# Patient Record
Sex: FEMALE | Race: BLACK OR AFRICAN AMERICAN | NOT HISPANIC OR LATINO | Employment: OTHER | URBAN - METROPOLITAN AREA
[De-identification: names, ages, dates, MRNs, and addresses within clinical notes are randomized per-mention and may not be internally consistent; named-entity substitution may affect disease eponyms.]

---

## 2017-01-04 ENCOUNTER — TELEPHONE (OUTPATIENT)
Dept: UROGYNECOLOGY | Facility: CLINIC | Age: 72
End: 2017-01-04

## 2017-01-04 NOTE — TELEPHONE ENCOUNTER
----- Message from Ivon Mata NP sent at 1/3/2017  6:00 PM CST -----  She wants 01/13/2017.  Please call her to schedule.    Ivon

## 2017-01-05 ENCOUNTER — INFUSION (OUTPATIENT)
Dept: INFUSION THERAPY | Facility: HOSPITAL | Age: 72
End: 2017-01-05
Attending: INTERNAL MEDICINE
Payer: MEDICARE

## 2017-01-05 ENCOUNTER — TELEPHONE (OUTPATIENT)
Dept: HEMATOLOGY/ONCOLOGY | Facility: CLINIC | Age: 72
End: 2017-01-05

## 2017-01-05 DIAGNOSIS — C34.90 LUNG CANCER: Primary | ICD-10-CM

## 2017-01-05 DIAGNOSIS — C34.90 MALIGNANT NEOPLASM OF LUNG, UNSPECIFIED LATERALITY, UNSPECIFIED PART OF LUNG: Primary | ICD-10-CM

## 2017-01-05 PROCEDURE — 63600175 PHARM REV CODE 636 W HCPCS: Performed by: INTERNAL MEDICINE

## 2017-01-05 PROCEDURE — 96401 CHEMO ANTI-NEOPL SQ/IM: CPT

## 2017-01-05 RX ADMIN — DENOSUMAB 120 MG: 120 INJECTION SUBCUTANEOUS at 01:01

## 2017-01-05 NOTE — TELEPHONE ENCOUNTER
Returned call to patient, who is calling to request to schedule an appointment with dietary.   Message routed to kirstin ludwig to schedule appointment. Patient voiced understanding the clinic will be in touch with her with appointment date/time.

## 2017-01-05 NOTE — TELEPHONE ENCOUNTER
----- Message from Maribel Brian sent at 1/5/2017  4:20 PM CST -----  Contact: basil-pt's son  Pt had blood work and Basil has some question regarding this.    Contact number [phone]  Ext [phone]

## 2017-01-05 NOTE — TELEPHONE ENCOUNTER
Returned call to patient's son, who is calling to clarify why patient is needing repeat blood work tomorrow.  Nurse explained what creatinine is to son and why patient needs to drink plenty fluids ---  Son voiced understanding.

## 2017-01-05 NOTE — TELEPHONE ENCOUNTER
----- Message from Maribel Brian sent at 1/5/2017  3:50 PM CST -----  Contact: self  Pt is returning a missed call from the nurse.    contact number [phone]

## 2017-01-05 NOTE — NURSING
1337 -- Patient's corrected Ca+ 8.9.  Notified Dr. Vazquez who said OK to give Xgeva as per Zandra, RN.  Patient tolerated well.  Zandra Rn also notified of patient's request to see dietician.  Patient reports taking Ca+ and Vit. D supplements and denies jaw pain.

## 2017-01-05 NOTE — TELEPHONE ENCOUNTER
Informed patient her creatinine is slightly elevated--so encouraged patient to drink plenty fluids---repeat CMP tomorrow. Patient voiced understanding.

## 2017-01-06 ENCOUNTER — LAB VISIT (OUTPATIENT)
Dept: LAB | Facility: HOSPITAL | Age: 72
End: 2017-01-06
Attending: INTERNAL MEDICINE
Payer: MEDICARE

## 2017-01-06 ENCOUNTER — PATIENT MESSAGE (OUTPATIENT)
Dept: HEMATOLOGY/ONCOLOGY | Facility: CLINIC | Age: 72
End: 2017-01-06

## 2017-01-06 ENCOUNTER — TELEPHONE (OUTPATIENT)
Dept: HEMATOLOGY/ONCOLOGY | Facility: CLINIC | Age: 72
End: 2017-01-06

## 2017-01-06 DIAGNOSIS — C34.31 MALIGNANT NEOPLASM OF LOWER LOBE OF RIGHT LUNG: ICD-10-CM

## 2017-01-06 LAB
ALBUMIN SERPL BCP-MCNC: 2.7 G/DL
ALP SERPL-CCNC: 86 U/L
ALT SERPL W/O P-5'-P-CCNC: 41 U/L
ANION GAP SERPL CALC-SCNC: 8 MMOL/L
AST SERPL-CCNC: 46 U/L
BILIRUB SERPL-MCNC: 0.9 MG/DL
BUN SERPL-MCNC: 20 MG/DL
CALCIUM SERPL-MCNC: 7.8 MG/DL
CHLORIDE SERPL-SCNC: 114 MMOL/L
CO2 SERPL-SCNC: 20 MMOL/L
CREAT SERPL-MCNC: 1.7 MG/DL
EST. GFR  (AFRICAN AMERICAN): 34.2 ML/MIN/1.73 M^2
EST. GFR  (NON AFRICAN AMERICAN): 29.7 ML/MIN/1.73 M^2
GLUCOSE SERPL-MCNC: 82 MG/DL
POTASSIUM SERPL-SCNC: 3.7 MMOL/L
PROT SERPL-MCNC: 6.1 G/DL
SODIUM SERPL-SCNC: 142 MMOL/L

## 2017-01-06 PROCEDURE — 80053 COMPREHEN METABOLIC PANEL: CPT

## 2017-01-06 PROCEDURE — 36415 COLL VENOUS BLD VENIPUNCTURE: CPT

## 2017-01-06 NOTE — TELEPHONE ENCOUNTER
Left message for patient informing her her creatinine is slightly better--patient needs 1 liter NS tomorrow at 8am and repeat CMP on Monday. Appointments made and patient encouraged to push plenty oral fluids.

## 2017-01-06 NOTE — TELEPHONE ENCOUNTER
----- Message from Karrie Vazquez MD sent at 1/6/2017 11:31 AM CST -----  Creatinine is slightly better. Can you see i she wants to do IV fluids either today or tomorrow but definitely recheck CMP on Monday 1/9/17

## 2017-01-07 ENCOUNTER — INFUSION (OUTPATIENT)
Dept: INFUSION THERAPY | Facility: HOSPITAL | Age: 72
End: 2017-01-07
Attending: INTERNAL MEDICINE
Payer: MEDICARE

## 2017-01-07 VITALS
SYSTOLIC BLOOD PRESSURE: 144 MMHG | HEART RATE: 59 BPM | DIASTOLIC BLOOD PRESSURE: 62 MMHG | RESPIRATION RATE: 20 BRPM | TEMPERATURE: 98 F

## 2017-01-07 DIAGNOSIS — C34.31 MALIGNANT NEOPLASM OF LOWER LOBE OF RIGHT LUNG: ICD-10-CM

## 2017-01-07 DIAGNOSIS — N17.9 ACUTE RENAL FAILURE, UNSPECIFIED ACUTE RENAL FAILURE TYPE: Primary | ICD-10-CM

## 2017-01-07 PROCEDURE — 96360 HYDRATION IV INFUSION INIT: CPT

## 2017-01-07 PROCEDURE — 25000003 PHARM REV CODE 250: Performed by: INTERNAL MEDICINE

## 2017-01-07 RX ADMIN — SODIUM CHLORIDE 1000 ML: 0.9 INJECTION, SOLUTION INTRAVENOUS at 08:01

## 2017-01-07 NOTE — NURSING
Pt tolerated 1 liter NS with VSS. Pt instructed to call MD with any problems. AVS given and pt discharged home

## 2017-01-09 ENCOUNTER — TELEPHONE (OUTPATIENT)
Dept: HEMATOLOGY/ONCOLOGY | Facility: CLINIC | Age: 72
End: 2017-01-09

## 2017-01-09 DIAGNOSIS — N17.9 ACUTE RENAL FAILURE, UNSPECIFIED ACUTE RENAL FAILURE TYPE: ICD-10-CM

## 2017-01-09 DIAGNOSIS — C34.31 MALIGNANT NEOPLASM OF LOWER LOBE OF RIGHT LUNG: Primary | ICD-10-CM

## 2017-01-09 NOTE — TELEPHONE ENCOUNTER
Spoke with patient.  Patient understands she needs to drink more fluids, as her creatinine is still slightly elevated.  Patient states she would prefer HH to be set up for her to receive IVFs.     Message routed to dr sullivan

## 2017-01-09 NOTE — TELEPHONE ENCOUNTER
----- Message from Karrie Vazquez MD sent at 1/9/2017  3:31 PM CST -----  Contact: Pt's son Basil  She should come for some IV fluids.   Does she want to do home health IV fluids  ----- Message -----     From: Zandra Matt RN     Sent: 1/9/2017   3:06 PM       To: Karrie Vazquez MD    Creatinine is 1.7  Encourage plenty oral fluids?  ~zandra    ----- Message -----     From: Johanny Trimble     Sent: 1/9/2017   2:42 PM       To: George DENG Staff    Pt's son Basil is calling to speak with a nurse or Dr. Vazquez in regards to the tests his mother had last week and today.     Contact number is [phone]

## 2017-01-10 ENCOUNTER — INFUSION (OUTPATIENT)
Dept: INFUSION THERAPY | Facility: HOSPITAL | Age: 72
End: 2017-01-10
Attending: INTERNAL MEDICINE
Payer: MEDICARE

## 2017-01-10 ENCOUNTER — PATIENT MESSAGE (OUTPATIENT)
Dept: HEMATOLOGY/ONCOLOGY | Facility: CLINIC | Age: 72
End: 2017-01-10

## 2017-01-10 ENCOUNTER — TELEPHONE (OUTPATIENT)
Dept: PHARMACY | Facility: CLINIC | Age: 72
End: 2017-01-10

## 2017-01-10 VITALS — SYSTOLIC BLOOD PRESSURE: 136 MMHG | DIASTOLIC BLOOD PRESSURE: 63 MMHG | RESPIRATION RATE: 18 BRPM | HEART RATE: 63 BPM

## 2017-01-10 DIAGNOSIS — E86.0 DEHYDRATION: Primary | ICD-10-CM

## 2017-01-10 PROCEDURE — 96360 HYDRATION IV INFUSION INIT: CPT

## 2017-01-10 PROCEDURE — 25000003 PHARM REV CODE 250: Performed by: INTERNAL MEDICINE

## 2017-01-10 RX ADMIN — SODIUM CHLORIDE 1000 ML: 0.9 INJECTION, SOLUTION INTRAVENOUS at 04:01

## 2017-01-10 NOTE — PLAN OF CARE
Problem: Patient Care Overview (Adult)  Goal: Plan of Care Review  Outcome: Ongoing (interventions implemented as appropriate)  Pt tolerated 1L NS well.

## 2017-01-12 ENCOUNTER — INFUSION (OUTPATIENT)
Dept: INFUSION THERAPY | Facility: HOSPITAL | Age: 72
End: 2017-01-12
Attending: INTERNAL MEDICINE
Payer: MEDICARE

## 2017-01-12 DIAGNOSIS — C34.31 CANCER OF LOWER LOBE OF RIGHT LUNG: Primary | ICD-10-CM

## 2017-01-12 PROCEDURE — 96361 HYDRATE IV INFUSION ADD-ON: CPT

## 2017-01-12 PROCEDURE — 96360 HYDRATION IV INFUSION INIT: CPT

## 2017-01-12 PROCEDURE — 25000003 PHARM REV CODE 250: Performed by: INTERNAL MEDICINE

## 2017-01-12 RX ADMIN — SODIUM CHLORIDE 1000 ML: 0.9 INJECTION, SOLUTION INTRAVENOUS at 04:01

## 2017-01-13 NOTE — PLAN OF CARE
Problem: Patient Care Overview (Adult)  Goal: Plan of Care Review  Outcome: Ongoing (interventions implemented as appropriate)  Pt. Tolerated infusion. V/S stable. Peripheral IV discontinued. No questions at this time.

## 2017-01-17 ENCOUNTER — INFUSION (OUTPATIENT)
Dept: INFUSION THERAPY | Facility: HOSPITAL | Age: 72
End: 2017-01-17
Attending: INTERNAL MEDICINE
Payer: MEDICARE

## 2017-01-17 ENCOUNTER — TELEPHONE (OUTPATIENT)
Dept: PHARMACY | Facility: CLINIC | Age: 72
End: 2017-01-17

## 2017-01-17 VITALS — HEART RATE: 74 BPM | RESPIRATION RATE: 18 BRPM | DIASTOLIC BLOOD PRESSURE: 71 MMHG | SYSTOLIC BLOOD PRESSURE: 154 MMHG

## 2017-01-17 DIAGNOSIS — E86.0 DEHYDRATION: Primary | ICD-10-CM

## 2017-01-17 PROCEDURE — 25000003 PHARM REV CODE 250: Performed by: INTERNAL MEDICINE

## 2017-01-17 PROCEDURE — 96360 HYDRATION IV INFUSION INIT: CPT

## 2017-01-17 RX ADMIN — SODIUM CHLORIDE 1000 ML: 0.9 INJECTION, SOLUTION INTRAVENOUS at 04:01

## 2017-01-17 NOTE — MR AVS SNAPSHOT
Patient Information     Patient Name Sex Naty Betancur Female 1945      Visit Information        Provider Department Dept Phone Center    2017 4:00 PM HOLL[facility], CHEMO [facility] Chemotherapy Infusion [phone] Daniel Dillard      Patient Instructions     None      Your Current Medications Are     amitriptyline (ELAVIL) 25 MG tablet    amlodipine (NORVASC) 5 MG tablet    atenolol (TENORMIN) 50 MG tablet    ciprofloxacin HCl (CILOXAN) 0.3 % ophthalmic solution    clindamycin phosphate 1% (CLINDAGEL) 1 % gel    CREON CpDR    denosumab (XGEVA) 120 mg/1.7 mL (70 mg/mL) Soln    dronabinol (MARINOL) 5 MG capsule    enoxaparin (LOVENOX) 100 mg/mL Syrg    ERGOCALCIFEROL, VITAMIN D2, (VITAMIN D ORAL)    erlotinib (TARCEVA) 150 MG tablet    escitalopram oxalate (LEXAPRO) 10 MG tablet    hydrocodone-acetaminophen 5-325mg (NORCO) 5-325 mg per tablet    levetiracetam (KEPPRA) 750 MG Tab    lorazepam (ATIVAN) 1 MG tablet    mirabegron 50 mg Tb24    multivitamin (THERAGRAN) per tablet    ondansetron (ZOFRAN) 8 MG tablet    rivaroxaban (XARELTO) 20 mg Tab      Facility-Administered Medications     denosumab (XGEVA) solution 120 mg    sodium chloride 0.9% bolus 1,000 mL      Appointments for Next Year     2017 10:30 AM ESTABLISHED PATIENT (30 min.) [facility] Aide Carvalho DO    Arrive at check-in approximately 15 minutes before your scheduled appointment time. Bring all outside medical records and imaging, along with a list of your current medications and insurance card.    [facility], [address] Please park in [facility] Parking [facility]age.    2017  4:00 PM INFUSION 090 MIN (90 min.) [facility], CHEMO    Arrive at check-in approximately 15 minutes before your scheduled appointment time. Bring all outside medical records and imaging, along with a list of your current medications and insurance card.    [facility], 5th Floor    2017   1:30 PM CONSULT (60 min.) Reece Milan - Nutrition DIETITIAN, GENERAL INT MED [facility]    Arrive at check-in approximately 15 minutes before your scheduled appointment time. Bring all outside medical records and imaging, along with a list of your current medications and insurance card.    [facility] Bldg    1/24/2017  3:20 PM NON FASTING LAB (10 min.) [facility], HEMONC CANCER DG    Arrive at check-in approximately 15 minutes before your scheduled appointment time. Bring all outside medical records and imaging, along with a list of your current medications and insurance card.    [facility] 3rd Floor    1/24/2017  4:00 PM INFUSION 090 MIN (90 min.) [facility], CHEMO    Arrive at check-in approximately 15 minutes before your scheduled appointment time. Bring all outside medical records and imaging, along with a list of your current medications and insurance card.    [facility], 5th Floor    1/26/2017  4:00 PM INFUSION 090 MIN (90 min.) [facility], CHEMO    Arrive at check-in approximately 15 minutes before your scheduled appointment time. Bring all outside medical records and imaging, along with a list of your current medications and insurance card.    [facility], 5th Floor    1/31/2017 10:30 AM PET CT  (30 min.) [facility] PET CT LIMIT 400 LBS    You have been scheduled for a PET scan. Do Not eat or drink anything 6 hours before your test, except for water. If you are scheduled at [facility], report to the Radiology / Laboratory check in on the second floor.  Upon arrival to the PET department we will have to do a finger stick to check your glucose level. An intravenous line (IV) will be started, and you will be given a PET tracer. This injection needs to circulate for about 60 minutes while you are sitting in a quiet area. You will  lie on a table and the camera will take pictures, this takes about 30-45 minutes.  A special radiopharmaceutical must be ordered for your test so if you have any questions or need to reschedule your appointment please call nuclear medicine ([phone]).    Report to Radiology/Laboratory  - 2nd Floor    2/2/2017  1:00 PM NON FASTING LAB (15 min.) [facility], HEMONC SAME DAY    Arrive at check-in approximately 15 minutes before your scheduled appointment time. Bring all outside medical records and imaging, along with a list of your current medications and insurance card.    [facility] 3rd Floor    2/2/2017  2:00 PM ESTABLISHED PATIENT (30 min.) [facility] Hematology Oncology Karrie Vazquez MD    Arrive at check-in approximately 15 minutes before your scheduled appointment time. Bring all outside medical records and imaging, along with a list of your current medications and insurance card.    [facility] - 3rd Floor    2/2/2017  2:45 PM INJECTION (15 min.) [facility] INJECTION, [facility] INFUSION    Arrive at check-in approximately 15 minutes before your scheduled appointment time. Bring all outside medical records and imaging, along with a list of your current medications and insurance card.    [facility], 5th Floor    3/13/2017 12:00 PM MRI BRAIN CONT (45 min.) [facility] MRI WIDE BORE    Magnetic Resonance Imaging- You will not be allowed to have the MRI if you have a cardiac pace-maker, implantable defibrillator, neurostimulator, biostimulator, or if you have anuerysm clips in your brain (from many years ago).  WHAT YOUR DOCTOR NEEDS TO KNOW: You should tell your doctor if you have any metal in your body either from surgery or an injury. This includes metal pins, clips, plates, screws, shrapnel, ear implants, or permanent eyeliner. If female, you should tell your doctor if there is a chance you might be pregnant.  Please bring with you any papers your doctor has given you to sign. Please bring with you a list of medications you are currently taking!  PLEASE REPORT TO THE MAGNETIC RESONANCE CENTER 30 MINUTES PRIOR TO YOUR SCHEDULED APPOINTMENT TIME.  WHAT IS THE PURPOSE OF THIS TEST: An MRI is a painless test that takes pictures of the inside of your body. The pictures are taken in slices which show only a few layers of body tissue at a time. Pictures taken this way can help your doctor find and see problems in the body more easily. The MRI uses a magnetic field and radio waves instead of X-RAYS.  WHERE WILL YOUR TEST BE DONE AND BY WHOM? The test will be done in the MRI building. It will be done under the supervision of a radiologist and a radiologic technologist. The person giving you these instructions will tell you where to report for this test. It usually takes 15 minutes to one hour.  HOW TO PREPARE FOR YOUR TEST: Wear comfortable clothing with no metal buttons or zippers. Do not wear jewelry including rings, earrings, necklaces, bracelets, or watches. Take all metal objects out of your hair. You will be asked to answer yes or no on a questionaire form regarding the possibility of any metal in your body. Please bring someone with you if you are unable to answer the questions. A parent must accompany any child having an MRI. Tell your doctor if you are afraid of being in a closed or cramped space. Your doctor will decide if you need medicine to help you relax.  A small needle may be placed in a vein in your arm or hand so that you may receive a contrast solution. THIS IS NOT A DYE AND DOES NOT CONTAIN IODINE. NO special preparation for any MRI exam EXCEPT for a Magnetic Resonance Cholangiopancreatogram which the patient should fast 4 hours prior to the scheduled  appointment time. You may take your usual medication with a small sip of water.  WHAT TO EXPECT DURING YOUR TEST: The radiologic technologist will check to make  sure that you have removed all metal objects. You will be asked to lie down on the table of the MRI machine. To allow for the best quality exam, it is VERY IMPORTANT that you LIE VERY STILL during your exam. When the test starts, the table will move into the open tunnel of the machine. Once the machine starts taking pictures, you will hear loud hammering or grinding noises. You may be able to wear headphones and hear music to block out the loud noise of the machine. If your test requires the use of contrast material, a small needle will be placed in a vein of your arm or hand. The contrast material will be pushed through the IV tube that has been placed in your arm or hand. The skin around your IV may feel warm or cold. You should not have any changes in the way you feel. If you do, please tell the technologist.  WHAT TO EXPECT AFTER THE EXAM: If you were given medication to relax you, someone else will need to drive you home. DO NOT DRIVE OR USE HEAVY EQUIPMENT IF YOU TAKE MEDICATION THAT MAKES YOU DROWSY. You may resume normal daily activity if you did not receive sedation.  WHAT YOU NEED TO KNOW ABOUT YOUR APPOINTMENT: If you are unable to follow the above instructions or have questions or if you are delayed or unable to keep your scheduled appointment, please notify the following: In [place], call the Magnetic Resonance Center at [phone] In [place], call the Radiology Department at [phone]. On the [facility], call the Radiology Department at [phone]. On the [facility], call the Radiology Department at [phone].    [facility]    3/13/2017  1:30 PM ESTABLISHED PATIENT (30 min.) Reece Milan - Neurosurgery [facility] Salty Ferrera MD    Arrive at check-in approximately 15 minutes before your scheduled appointment time. Bring all outside medical records and imaging, along with a list of your current medications and insurance card.    7th Floor      Allergies     Tobradex  "[Tobramycin-dexamethasone] Swelling    Iodinated Contrast Media - Iv Dye Hives    Morphine Other (See Comments)    "shaking" and tremors    Latex Rash    Phenytoin Sodium Extended Other (See Comments), Rash      Medications You Received from 01/16/2017 1756 to 01/17/2017 1756        Date/Time Order Dose Route Action     01/17/2017 1645 sodium chloride 0.9% bolus 1,000 mL 1,000 mL Intravenous New Bag      Current Discharge Medication List     Cannot display discharge medications since this is not an admission.      "

## 2017-01-17 NOTE — PLAN OF CARE
Problem: Patient Care Overview (Adult)  Goal: Discharge Needs Assessment  Outcome: Ongoing (interventions implemented as appropriate)  -1601 Patient tolerated treatment well. Discharged without complaints or S/S of adverse event. AVS given.  Instructed to call provider for any questions or concerns.

## 2017-01-17 NOTE — PLAN OF CARE
Problem: Patient Care Overview (Adult)  Goal: Individualization & Mutuality  Outcome: Ongoing (interventions implemented as appropriate)  -2611 Labs , hx, and medications reviewed. Assessment completed. Discussed plan of care with patient. Patient in agreement. Chair reclined and warm blanket and snack offered.

## 2017-01-18 ENCOUNTER — TELEPHONE (OUTPATIENT)
Dept: HEMATOLOGY/ONCOLOGY | Facility: CLINIC | Age: 72
End: 2017-01-18

## 2017-01-18 NOTE — TELEPHONE ENCOUNTER
----- Message from Maribel Brian sent at 1/18/2017  8:27 AM CST -----  Contact: Basil MarcialPt's son  Pt's son is calling for test results.    Contact number [phone]  Ext [phone]

## 2017-01-19 ENCOUNTER — TELEPHONE (OUTPATIENT)
Dept: HEMATOLOGY/ONCOLOGY | Facility: CLINIC | Age: 72
End: 2017-01-19

## 2017-01-19 NOTE — TELEPHONE ENCOUNTER
"Returned call to patient, who is calling to state she doesn't feel a nurse will be "able to stick her today."  Patient R arm is the only arm useable to start IVs.   Patient notes her R hand is slightly bruised (better than yesterday) from last IV on Tuesday.  Rescheduled patient's IVFs to tomorrow at 4pm.  Patient voiced understanding.  "

## 2017-01-19 NOTE — TELEPHONE ENCOUNTER
----- Message from Johanny Trimble sent at 1/19/2017 11:01 AM CST -----  Contact: self  Pt is calling to speak with Zandra in regards to fluids she has been having twice a week. She states her hand is very black and she is not sure if she should come in today.    Contact number is [phone]

## 2017-01-20 ENCOUNTER — INFUSION (OUTPATIENT)
Dept: INFUSION THERAPY | Facility: HOSPITAL | Age: 72
End: 2017-01-20
Attending: INTERNAL MEDICINE
Payer: MEDICARE

## 2017-01-20 ENCOUNTER — TELEPHONE (OUTPATIENT)
Dept: UROGYNECOLOGY | Facility: CLINIC | Age: 72
End: 2017-01-20

## 2017-01-20 ENCOUNTER — TELEPHONE (OUTPATIENT)
Dept: PHARMACY | Facility: CLINIC | Age: 72
End: 2017-01-20

## 2017-01-20 VITALS
HEART RATE: 70 BPM | RESPIRATION RATE: 18 BRPM | SYSTOLIC BLOOD PRESSURE: 146 MMHG | DIASTOLIC BLOOD PRESSURE: 77 MMHG | TEMPERATURE: 98 F

## 2017-01-20 DIAGNOSIS — E86.0 DEHYDRATION: Primary | ICD-10-CM

## 2017-01-20 PROCEDURE — 96360 HYDRATION IV INFUSION INIT: CPT

## 2017-01-20 PROCEDURE — 25000003 PHARM REV CODE 250: Performed by: INTERNAL MEDICINE

## 2017-01-20 RX ADMIN — SODIUM CHLORIDE 1000 ML: 0.9 INJECTION, SOLUTION INTRAVENOUS at 04:01

## 2017-01-20 NOTE — TELEPHONE ENCOUNTER
Pt wanted to know if her  can be seen today. Informed pt Dr Ozuna don't see male pt's. Pt voice understanding and call ended.

## 2017-01-20 NOTE — TELEPHONE ENCOUNTER
----- Message from Miesha Mata sent at 1/19/2017  4:37 PM CST -----  Contact: spouse  Pt is needing a call back, she have questions, she can be reached at [phone].

## 2017-01-24 ENCOUNTER — TELEPHONE (OUTPATIENT)
Dept: HEMATOLOGY/ONCOLOGY | Facility: CLINIC | Age: 72
End: 2017-01-24

## 2017-01-24 ENCOUNTER — NUTRITION (OUTPATIENT)
Dept: NUTRITION | Facility: CLINIC | Age: 72
End: 2017-01-24
Payer: MEDICARE

## 2017-01-24 ENCOUNTER — INFUSION (OUTPATIENT)
Dept: INFUSION THERAPY | Facility: HOSPITAL | Age: 72
End: 2017-01-24
Attending: INTERNAL MEDICINE
Payer: MEDICARE

## 2017-01-24 VITALS
HEART RATE: 73 BPM | TEMPERATURE: 99 F | RESPIRATION RATE: 18 BRPM | SYSTOLIC BLOOD PRESSURE: 138 MMHG | DIASTOLIC BLOOD PRESSURE: 69 MMHG

## 2017-01-24 VITALS — BODY MASS INDEX: 22.56 KG/M2 | WEIGHT: 135.38 LBS | HEIGHT: 65 IN

## 2017-01-24 DIAGNOSIS — Z00.8 NUTRITIONAL ASSESSMENT: ICD-10-CM

## 2017-01-24 DIAGNOSIS — F32.A DEPRESSION, UNSPECIFIED DEPRESSION TYPE: Primary | ICD-10-CM

## 2017-01-24 DIAGNOSIS — E86.0 DEHYDRATION: Primary | ICD-10-CM

## 2017-01-24 DIAGNOSIS — R63.4 UNINTENTIONAL WEIGHT LOSS: ICD-10-CM

## 2017-01-24 DIAGNOSIS — C34.31 MALIGNANT NEOPLASM OF LOWER LOBE OF RIGHT LUNG: Primary | ICD-10-CM

## 2017-01-24 DIAGNOSIS — N28.9 ACUTE RENAL DISEASE: Primary | ICD-10-CM

## 2017-01-24 PROCEDURE — 25000003 PHARM REV CODE 250: Performed by: INTERNAL MEDICINE

## 2017-01-24 PROCEDURE — 99999 PR PBB SHADOW E&M-EST. PATIENT-LVL III: CPT | Mod: PBBFAC,,,

## 2017-01-24 PROCEDURE — 96360 HYDRATION IV INFUSION INIT: CPT

## 2017-01-24 PROCEDURE — 97802 MEDICAL NUTRITION INDIV IN: CPT | Mod: S$GLB,,, | Performed by: DIETITIAN, REGISTERED

## 2017-01-24 RX ORDER — SODIUM CHLORIDE 9 MG/ML
INJECTION, SOLUTION INTRAVENOUS ONCE
Status: COMPLETED | OUTPATIENT
Start: 2017-01-24 | End: 2017-01-24

## 2017-01-24 RX ADMIN — SODIUM CHLORIDE: 0.9 INJECTION, SOLUTION INTRAVENOUS at 03:01

## 2017-01-24 NOTE — PLAN OF CARE
Problem: Patient Care Overview (Adult)  Goal: Plan of Care Review  Outcome: Ongoing (interventions implemented as appropriate)  1653-Patient tolerated treatment well. Discharged without complaints or S/S of adverse event. AVS given.  Instructed to call provider for any questions or concerns. Discussed patients upcoming appointments, patient verbalized understanding and will return to clinic Thursday.

## 2017-01-24 NOTE — MR AVS SNAPSHOT
Patient Information     Patient Name Sex Naty Betancur Female 1945      Visit Information        Provider Department Dept Phone Center    2017 4:00 PM NOM, CHEMO [facility] Chemotherapy Infusion [phone] Daniel Dillard      Patient Instructions     None      Your Current Medications Are     amitriptyline (ELAVIL) 25 MG tablet    amlodipine (NORVASC) 5 MG tablet    atenolol (TENORMIN) 50 MG tablet    clindamycin phosphate 1% (CLINDAGEL) 1 % gel    CREON CpDR    denosumab (XGEVA) 120 mg/1.7 mL (70 mg/mL) Soln    dronabinol (MARINOL) 5 MG capsule    enoxaparin (LOVENOX) 100 mg/mL Syrg    ERGOCALCIFEROL, VITAMIN D2, (VITAMIN D ORAL)    erlotinib (TARCEVA) 150 MG tablet    escitalopram oxalate (LEXAPRO) 10 MG tablet    hydrocodone-acetaminophen 5-325mg (NORCO) 5-325 mg per tablet    levetiracetam (KEPPRA) 750 MG Tab    lorazepam (ATIVAN) 1 MG tablet    mirabegron 50 mg Tb24    multivitamin (THERAGRAN) per tablet    ondansetron (ZOFRAN) 8 MG tablet    rivaroxaban (XARELTO) 20 mg Tab      Facility-Administered Medications     0.9%  NaCl infusion    denosumab (XGEVA) solution 120 mg      Appointments for Next Year     2017  2:20 PM CONSULT - NEPHROLOGY ([facility]) (40 min.) [facility]phrology Mihaela Bazzi MD    Arrive at check-in approximately 15 minutes before your scheduled appointment time. Bring all outside medical records and imaging, along with a list of your current medications and insurance card.    [facility] - 5th Floor    2017  4:00 PM INFUSION 090 MIN (90 min.) [facility], CHEMO    Arrive at check-in approximately 15 minutes before your scheduled appointment time. Bring all outside medical records and imaging, along with a list of your current medications and insurance card.    [facility], 5th Floor    2017  3:30 PM US RETROPER (45 min.) [facility] US 11 ALL    Arrive at check-in approximately 15 minutes  before your scheduled appointment time. Bring all outside medical records and imaging, along with a list of your current medications and insurance card.    2nd Floor    1/31/2017 10:30 AM PET CT  (30 min.) [facility] PET CT LIMIT 400 LBS    You have been scheduled for a PET scan. Do Not eat or drink anything 6 hours before your test, except for water. If you are scheduled at [facility], report to the Radiology / Laboratory check in on the second floor.  Upon arrival to the PET department we will have to do a finger stick to check your glucose level. An intravenous line (IV) will be started, and you will be given a PET tracer. This injection needs to circulate for about 60 minutes while you are sitting in a quiet area. You will lie on a table and the camera will take pictures, this takes about 30-45 minutes.  A special radiopharmaceutical must be ordered for your test so if you have any questions or need to reschedule your appointment please call nuclear medicine ([phone]).    Report to Radiology/Laboratory  - 2nd Floor    2/1/2017  1:30 PM ESTABLISHED PATIENT (30 min.) [facility] Aide Carvalho DO    Arrive at check-in approximately 15 minutes before your scheduled appointment time. Bring all outside medical records and imaging, along with a list of your current medications and insurance card.    [facility], [address] Please park in [facility] Parking [facility]age.    2/2/2017  1:00 PM NON FASTING LAB (15 min.) [facility] LAB, HEMONC SAME DAY    Arrive at check-in approximately 15 minutes before your scheduled appointment time. Bring all outside medical records and imaging, along with a list of your current medications and insurance card.    [facility] 3rd Floor    2/2/2017  2:00 PM ESTABLISHED PATIENT (30 min.) [place] - Hematology Oncology Karrie Vazquez MD    Arrive at check-in  approximately 15 minutes before your scheduled appointment time. Bring all outside medical records and imaging, along with a list of your current medications and insurance card.    [facility] - 3rd Floor    2/2/2017  2:45 PM INJECTION (15 min.) [facility] INJECTION, [facility] INFUSION    Arrive at check-in approximately 15 minutes before your scheduled appointment time. Bring all outside medical records and imaging, along with a list of your current medications and insurance card.    [facility], 5th Floor    3/13/2017 12:00 PM MRI BRAIN CONT (45 min.) [facility] MRI WIDE BORE    Magnetic Resonance Imaging- You will not be allowed to have the MRI if you have a cardiac pace-maker, implantable defibrillator, neurostimulator, biostimulator, or if you have anuerysm clips in your brain (from many years ago).  WHAT YOUR DOCTOR NEEDS TO KNOW: You should tell your doctor if you have any metal in your body either from surgery or an injury. This includes metal pins, clips, plates, screws, shrapnel, ear implants, or permanent eyeliner. If female, you should tell your doctor if there is a chance you might be pregnant. Please bring with you any papers your doctor has given you to sign. Please bring with you a list of medications you are currently taking!  PLEASE REPORT TO THE MAGNETIC RESONANCE CENTER 30 MINUTES PRIOR TO YOUR SCHEDULED APPOINTMENT TIME.  WHAT IS THE PURPOSE OF THIS TEST: An MRI is a painless test that takes pictures of the inside of your body. The pictures are taken in slices which show only a few layers of body tissue at a time. Pictures taken this way can help your doctor find and see problems in the body more easily. The MRI uses a magnetic field and radio waves instead of X-RAYS.  WHERE WILL YOUR TEST BE DONE AND BY WHOM? The test will be done in the MRI building. It will be done under the supervision of a radiologist and a radiologic technologist.  The person giving you these instructions will tell you where to report for this test. It usually takes 15 minutes to one hour.  HOW TO PREPARE FOR YOUR TEST: Wear comfortable clothing with no metal buttons or zippers. Do not wear jewelry including rings, earrings, necklaces, bracelets, or watches. Take all metal objects out of your hair. You will be asked to answer yes or no on a questionaire form regarding the possibility of any metal in your body. Please bring someone with you if you are unable to answer the questions. A parent must accompany any child having an MRI. Tell your doctor if you are afraid of being in a closed or cramped space. Your doctor will decide if you need medicine to help you relax.  A small needle may be placed in a vein in your arm or hand so that you may receive a contrast solution. THIS IS NOT A DYE AND DOES NOT CONTAIN IODINE. NO special preparation for any MRI exam EXCEPT for a Magnetic Resonance Cholangiopancreatogram which the patient should fast 4 hours prior to the scheduled  appointment time. You may take your usual medication with a small sip of water.  WHAT TO EXPECT DURING YOUR TEST: The radiologic technologist will check to make sure that you have removed all metal objects. You will be asked to lie down on the table of the MRI machine. To allow for the best quality exam, it is VERY IMPORTANT that you LIE VERY STILL during your exam. When the test starts, the table will move into the open tunnel of the machine. Once the machine starts taking pictures, you will hear loud hammering or grinding noises. You may be able to wear headphones and hear music to block out the loud noise of the machine. If your test requires the use of contrast material, a small needle will be placed in a vein of your arm or hand. The contrast material will be pushed through the IV tube that has been placed in your arm or hand. The skin around your IV may feel warm or cold. You should not have any changes in  "the way you feel. If you do, please tell the technologist.  WHAT TO EXPECT AFTER THE EXAM: If you were given medication to relax you, someone else will need to drive you home. DO NOT DRIVE OR USE HEAVY EQUIPMENT IF YOU TAKE MEDICATION THAT MAKES YOU DROWSY. You may resume normal daily activity if you did not receive sedation.  WHAT YOU NEED TO KNOW ABOUT YOUR APPOINTMENT: If you are unable to follow the above instructions or have questions or if you are delayed or unable to keep your scheduled appointment, please notify the following: In [place], call the Magnetic Resonance Center at [phone] In [place], call the Radiology Department at [phone]. On the [facility], call the Radiology Department at [phone]. On the [facility], call the Radiology Department at [phone].    MRI Building    3/13/2017  1:30 PM ESTABLISHED PATIENT (30 min.) [facility] - Neurosurgery [facility] Salty Ferrera MD    Arrive at check-in approximately 15 minutes before your scheduled appointment time. Bring all outside medical records and imaging, along with a list of your current medications and insurance card.    7th Floor         Default Flowsheet Data (last 24 hours)      Amb Complex Vitals Yosvany        01/24/17 1520 01/24/17 1332             Measurements    Weight  61.4 kg (135 lb 5.8 oz)       Height  5' 5" (1.651 m)       BSA (Calculated - sq m)  1.68 sq meters       BMI (Calculated)  22.6       /63        Temp 98.6 °F (37 °C)        Pulse 68        Resp 18                Allergies     Tobradex [Tobramycin-dexamethasone] Swelling    Iodinated Contrast Media - Iv Dye Hives    Morphine Other (See Comments)    "shaking" and tremors    Latex Rash    Phenytoin Sodium Extended Other (See Comments), Rash      Medications You Received from 01/23/2017 1641 to 01/24/2017 1641        Date/Time Order Dose Route Action     01/24/2017 1543 0.9%  NaCl infusion   Intravenous New Bag      Current Discharge Medication List     " Cannot display discharge medications since this is not an admission.

## 2017-01-24 NOTE — TELEPHONE ENCOUNTER
----- Message from Maribel Brian sent at 1/24/2017  3:23 PM CST -----  Contact: Pt's son Basil  Please call the pt's son regarding the pt's lab results.    Contact number [phone]  Ext [phone]

## 2017-01-24 NOTE — TELEPHONE ENCOUNTER
Creatinine is even more elevated at 1.9. Continue twice weekly fluids?  Encourage plenty oral hydration --3 liters daily?      ~wen

## 2017-01-24 NOTE — PROGRESS NOTES
"Referring Physician:Karrie Vazquez MD     Reason for visit:  Chief Complaint   Patient presents with    Lung Cancer    Nutrition Counseling        :1945 /    Allergies Reviewed  Meds Reviewed    Anthropometrics  Weight:61.4 kg (135 lb 5.8 oz)  Height:5' 5" (1.651 m)  BMI:Body mass index is 22.53 kg/(m^2).   IBW:   56.8 kg    Meds:  Outpatient Medications Prior to Visit   Medication Sig Dispense Refill    amitriptyline (ELAVIL) 25 MG tablet Take one tablet by mouth once daily 30 tablet 3    amlodipine (NORVASC) 5 MG tablet Take 1 tablet (5 mg total) by mouth once daily. 30 tablet 11    atenolol (TENORMIN) 50 MG tablet Take one tablet by mouth once daily 30 tablet 4    clindamycin phosphate 1% (CLINDAGEL) 1 % gel Apply topically 2 (two) times daily. 60 g 6    CREON CpDR TAKE ONE CAPSULE BY MOUTH THREE TIMES A DAY WITH MEALS (Patient taking differently: TAKE ONE CAPSULE BY MOUTH THREE TIMES A DAY 30 MINUTES BEFORE MEALS) 270 capsule 4    denosumab (XGEVA) 120 mg/1.7 mL (70 mg/mL) Soln Inject 120 mg into the skin every 28 days.      dronabinol (MARINOL) 5 MG capsule TAKE ONE CAPSULE BY MOUTH TWO TIMES A DAY BEFORE MEALS 60 capsule 2    enoxaparin (LOVENOX) 100 mg/mL Syrg Inject 1 mL (100 mg total) into the skin once daily. 30 Syringe 3    ERGOCALCIFEROL, VITAMIN D2, (VITAMIN D ORAL) Take by mouth.      erlotinib (TARCEVA) 150 MG tablet Take 1 tablet (150 mg total) by mouth once daily. 30 tablet 6    escitalopram oxalate (LEXAPRO) 10 MG tablet Take 1 tablet (10 mg total) by mouth once daily. 30 tablet 2    hydrocodone-acetaminophen 5-325mg (NORCO) 5-325 mg per tablet Take 1 tablet by mouth every 6 (six) hours as needed for Pain. 20 tablet 0    levetiracetam (KEPPRA) 750 MG Tab Take 1 tablet (750 mg total) by mouth 2 (two) times daily. 60 tablet 3    lorazepam (ATIVAN) 1 MG tablet TAKE ONE TABLET BY MOUTH TWICE DAILY 60 tablet 0    mirabegron 50 mg Tb24 Take 1 tablet (50 mg total) by mouth once " daily. 30 tablet 11    multivitamin (THERAGRAN) per tablet Take 1 tablet by mouth once daily.      ondansetron (ZOFRAN) 8 MG tablet Take 1 tablet (8 mg total) by mouth 4 (four) times daily as needed for Nausea. 60 tablet 2    rivaroxaban (XARELTO) 20 mg Tab Take 1 tablet (20 mg total) by mouth daily with dinner or evening meal. 30 tablet 2     Facility-Administered Medications Prior to Visit   Medication Dose Route Frequency Provider Last Rate Last Dose    denosumab (XGEVA) solution 120 mg  120 mg Subcutaneous 1 time in Clinic/HOD Karrie Vazquez MD             Labs:   Cr  1.6   Alb  2.4     Estimated Nutrition Needs:   2149 Kcals/day( 35 kcal/kg to promote weight gain),  61-73 g protein( 1.0-1.2 g/kg)     Diet Hx:    Pt & spouse present for encounter.  Pt states her UBW was 176 lbs a year ago, prior to brain surgery for tumor removal.  She states the Marinol she is taking is helping to stimulate her appetite; she feels like she is generally eating well.  Her  prepares meals; they grocery shop together.  Pt reports she usually eats 3 meals/day; if she snacks in the evening it's a few cookies.  Is drinking at least 6 bottles of water/day per MD recc (2/2 elevated Cr); also drinks fruit juices.  Avoids milk-2/2 lactose intolerance.  Has lost her taste for bread/rice.    Breakfast:    cooks:  Grits and eggs; this am:  2 sausage biscuits, turkey jin, fresh orange, apple juice    Note:  Pt states if she doesn't eat breakfast until 10 am, she only eats 2 meals/day.  Lunch: varies; did not eat any lunch today prior to 1:30 appt (with labs and other appt to follow)    Dinner:   Last night:  Smothered chicken with spinach.    Evening snack:  Vanilla wafers, and sandwich cookies a little later    Assessment:   Pt & spouse very attentive and asked relevant questions.  They were interested in healthy eating diet regimen, and spouse seems very supportive; he states he is willing to prepare anything pt would  like.  We discussed importance of small, frequent meals and snacks; healthy snack ideas; recipes; high-calorie, high-protein grocery shopping and cooking tips.  All questions were answered and nutrition concerns addressed.  Pt & spouse verbalized understanding of information.      Nutrition Diagnosis:   Involuntary weight loss RT inadequate energy intake AEB metastatic cancer    Recommendations:   High calorie, high protein diet.  Aim for 5-6 meals/snacks daily.  Encourage fluid intake.  Handouts provided & reviewed:  Nutrition for the Person With Cancer During Treatment:  A Guide for Patients and Families (ACS); Oral Care Recipes; High-Calorie, High-Protein Recipes; High-Calorie, High Protein Beverages        Consultation Time:30 minutes.    Follow Up:  Pt provided with dietitian contact number and advised to call with questions or make future appointment if further intervention needed.  Pt also provided with Oncology Dietitian Ashley Arredondo's contact information.

## 2017-01-24 NOTE — PLAN OF CARE
Problem: Patient Care Overview (Adult)  Goal: Individualization & Mutuality  1. Difficult IV start - only 1 arm accessible due to lymph node removal in L arm   Outcome: Ongoing (interventions implemented as appropriate)  1525-Labs , hx, and medications reviewed, patient seen by nutrition consult prior to arrival. Assessment completed. Discussed plan of care with patient. Patient in agreement. Chair reclined and warm blanket and snack offered.

## 2017-01-25 ENCOUNTER — TELEPHONE (OUTPATIENT)
Dept: ADMINISTRATIVE | Facility: OTHER | Age: 72
End: 2017-01-25

## 2017-01-25 ENCOUNTER — TELEPHONE (OUTPATIENT)
Dept: INFUSION THERAPY | Facility: HOSPITAL | Age: 72
End: 2017-01-25

## 2017-01-25 NOTE — TELEPHONE ENCOUNTER
----- Message from Karrie Vazquez MD sent at 1/24/2017  3:46 PM CST -----  Nita,  Please see below.  Order is in   ----- Message -----     From: Eleni Wheatley RN     Sent: 1/24/2017   3:40 PM       To: Karrie Vazquez MD, Zandra Matt RN    Ms St would like a referral to see Dr. Rincon, she reports intermittent depression.  Thanks!

## 2017-01-25 NOTE — TELEPHONE ENCOUNTER
Spoke with patient's son. Renal US and nephrology appointment scheduled. Patient will continue with twice weekly fluids in clinic.encouraged patient to drink 3 liters of water daily. Son voiced understanding.

## 2017-01-26 ENCOUNTER — OFFICE VISIT (OUTPATIENT)
Dept: NEPHROLOGY | Facility: CLINIC | Age: 72
End: 2017-01-26
Payer: MEDICARE

## 2017-01-26 ENCOUNTER — INFUSION (OUTPATIENT)
Dept: INFUSION THERAPY | Facility: HOSPITAL | Age: 72
End: 2017-01-26
Attending: INTERNAL MEDICINE
Payer: MEDICARE

## 2017-01-26 VITALS
SYSTOLIC BLOOD PRESSURE: 140 MMHG | WEIGHT: 135.38 LBS | DIASTOLIC BLOOD PRESSURE: 70 MMHG | BODY MASS INDEX: 22.56 KG/M2 | OXYGEN SATURATION: 98 % | HEIGHT: 65 IN | HEART RATE: 62 BPM

## 2017-01-26 VITALS
RESPIRATION RATE: 16 BRPM | DIASTOLIC BLOOD PRESSURE: 74 MMHG | HEART RATE: 69 BPM | SYSTOLIC BLOOD PRESSURE: 165 MMHG | TEMPERATURE: 98 F

## 2017-01-26 DIAGNOSIS — N39.0 URINARY TRACT INFECTION WITHOUT HEMATURIA, SITE UNSPECIFIED: ICD-10-CM

## 2017-01-26 DIAGNOSIS — R33.9 INCOMPLETE BLADDER EMPTYING: ICD-10-CM

## 2017-01-26 DIAGNOSIS — Z85.3 HISTORY OF BREAST CANCER: ICD-10-CM

## 2017-01-26 DIAGNOSIS — E83.51 HYPOCALCEMIA: ICD-10-CM

## 2017-01-26 DIAGNOSIS — N20.0 CALCIUM OXALATE KIDNEY STONES: ICD-10-CM

## 2017-01-26 DIAGNOSIS — I10 ESSENTIAL HYPERTENSION: Chronic | ICD-10-CM

## 2017-01-26 DIAGNOSIS — C34.90: Primary | ICD-10-CM

## 2017-01-26 DIAGNOSIS — N17.9 AKI (ACUTE KIDNEY INJURY): Primary | ICD-10-CM

## 2017-01-26 PROCEDURE — 25000003 PHARM REV CODE 250: Performed by: INTERNAL MEDICINE

## 2017-01-26 PROCEDURE — 96360 HYDRATION IV INFUSION INIT: CPT

## 2017-01-26 PROCEDURE — 3078F DIAST BP <80 MM HG: CPT | Mod: S$GLB,,, | Performed by: INTERNAL MEDICINE

## 2017-01-26 PROCEDURE — 99204 OFFICE O/P NEW MOD 45 MIN: CPT | Mod: S$GLB,,, | Performed by: INTERNAL MEDICINE

## 2017-01-26 PROCEDURE — 1157F ADVNC CARE PLAN IN RCRD: CPT | Mod: S$GLB,,, | Performed by: INTERNAL MEDICINE

## 2017-01-26 PROCEDURE — 3077F SYST BP >= 140 MM HG: CPT | Mod: S$GLB,,, | Performed by: INTERNAL MEDICINE

## 2017-01-26 PROCEDURE — 99499 UNLISTED E&M SERVICE: CPT | Mod: S$GLB,,, | Performed by: INTERNAL MEDICINE

## 2017-01-26 PROCEDURE — 99999 PR PBB SHADOW E&M-EST. PATIENT-LVL IV: CPT | Mod: PBBFAC,,, | Performed by: INTERNAL MEDICINE

## 2017-01-26 PROCEDURE — 1160F RVW MEDS BY RX/DR IN RCRD: CPT | Mod: S$GLB,,, | Performed by: INTERNAL MEDICINE

## 2017-01-26 PROCEDURE — 1159F MED LIST DOCD IN RCRD: CPT | Mod: S$GLB,,, | Performed by: INTERNAL MEDICINE

## 2017-01-26 PROCEDURE — 1126F AMNT PAIN NOTED NONE PRSNT: CPT | Mod: S$GLB,,, | Performed by: INTERNAL MEDICINE

## 2017-01-26 RX ADMIN — SODIUM CHLORIDE 1000 ML: 0.9 INJECTION, SOLUTION INTRAVENOUS at 05:01

## 2017-01-26 NOTE — PLAN OF CARE
Problem: Patient Care Overview  Goal: Individualization & Mutuality  Warm blankets cookies  Outcome: Ongoing (interventions implemented as appropriate)  Labs , hx, and medications reviewed. Assessment completed. Discussed plan of care with patient. Patient in agreement. VSS.  Chair reclined and warm blanket and snack offered. Will cont to monitor

## 2017-01-26 NOTE — MR AVS SNAPSHOT
[facility] - Nephrology  [address]  [facility] [address]  Phone: [phone]  Fax: [phone]                  [address]   2017 2:20 PM   Office Visit    Description:  Female : 1945   Provider:  Mihaela Bazzi MD   Department:  [facility] - Nephrology           Diagnoses this Visit        Comments    STEFAN (acute kidney injury)    -  Primary     Calcium oxalate kidney stones         Urinary tract infection without hematuria, site unspecified         Essential hypertension         Incomplete bladder emptying         Hypocalcemia         History of breast cancer                To Do List           Future Appointments        Provider Department Dept Phone    2017 3:30 PM NOM US 11 ALL [facility] [phone]    2017 10:30 AM [facility] PET CT LIMIT 400 LBS [facility] [phone]    2017 1:30 PM Aide Carvalho DO [facility] [phone]    2017 1:00 PM LAB, HEMONC SAME DAY [facility] [phone]    2017 2:00 PM Karrie Vazquez MD [place] - Hematology Oncology [phone]      Goals (5 Years of Data)     None      Follow-Up and Disposition     Return in about 1 week (around 2017).      [facility]     [facility] Care [facility] -  Assistance  Registered nurses in the [facility] provide clinical advisement, health education, appointment booking, and other advisory services.  Call for this free service at [phone].             Medications           Message regarding Medications     Verify the changes and/or additions to your medication regime listed below are the same as discussed with your clinician today.  If any of these changes or additions are incorrect, please notify your healthcare provider.        STOP taking these medications     rivaroxaban (XARELTO) 20 mg Tab Take 1 tablet (20 mg total) by mouth daily with dinner or evening meal.            Verify that the below list of medications is an accurate representation of the medications you are currently taking.  If none reported, the list may be blank. If incorrect, please contact your healthcare provider. Carry this list with you in case of emergency.           Current Medications     amitriptyline (ELAVIL) 25 MG tablet Take one tablet by mouth once daily    amlodipine (NORVASC) 5 MG tablet Take 1 tablet (5 mg total) by mouth once daily.    atenolol (TENORMIN) 50 MG tablet Take one tablet by mouth once daily    clindamycin phosphate 1% (CLINDAGEL) 1 % gel Apply topically 2 (two) times daily.    CREON CpDR TAKE ONE CAPSULE BY MOUTH THREE TIMES A DAY WITH MEALS    denosumab (XGEVA) 120 mg/1.7 mL (70 mg/mL) Soln Inject 120 mg into the skin every 28 days.    dronabinol (MARINOL) 5 MG capsule TAKE ONE CAPSULE BY MOUTH TWO TIMES A DAY BEFORE MEALS    enoxaparin (LOVENOX) 100 mg/mL Syrg Inject 1 mL (100 mg total) into the skin once daily.    ERGOCALCIFEROL, VITAMIN D2, (VITAMIN D ORAL) Take by mouth.    erlotinib (TARCEVA) 150 MG tablet Take 1 tablet (150 mg total) by mouth once daily.    escitalopram oxalate (LEXAPRO) 10 MG tablet Take 1 tablet (10 mg total) by mouth once daily.    hydrocodone-acetaminophen 5-325mg (NORCO) 5-325 mg per tablet Take 1 tablet by mouth every 6 (six) hours as needed for Pain.    levetiracetam (KEPPRA) 750 MG Tab Take 1 tablet (750 mg total) by mouth 2 (two) times daily.    lorazepam (ATIVAN) 1 MG tablet TAKE ONE TABLET BY MOUTH TWICE DAILY    mirabegron 50 mg Tb24 Take 1 tablet (50 mg total) by mouth once daily.    multivitamin (THERAGRAN) per tablet Take 1 tablet by mouth once daily.    ondansetron (ZOFRAN) 8 MG tablet Take 1 tablet (8 mg total) by mouth 4 (four) times daily as needed for Nausea.           Clinical Reference Information           Vital Signs - Last Recorded  Most recent update: 1/26/2017  3:07 PM by Negar Cohn MA    BP Pulse Ht Wt LMP SpO2    (!)  "140/70 62 5' 5" (1.651 m) 61.4 kg (135 lb 5.8 oz) (LMP Unknown) 98%    BMI                22.53 kg/m2          Blood Pressure          Most Recent Value    BP  (!)  140/70      Allergies as of 1/26/2017     Tobradex [Tobramycin-dexamethasone]    Iodinated Contrast Media - Iv Dye    Morphine    Latex    Phenytoin Sodium Extended      Immunizations Administered on Date of Encounter - 1/26/2017     None      Orders Placed During Today's Visit     Future Labs/Procedures Expected by Expires    TANMAY  1/26/2017 3/27/2018    Anti-neutrophilic cytoplasmic antibody  1/26/2017 3/27/2018    Immunofixation electrophoresis  1/26/2017 3/27/2018    Protein / creatinine ratio, urine  1/26/2017 1/26/2018    Protein electrophoresis, serum  1/26/2017 3/27/2018    Urinalysis  1/26/2017 1/26/2018    Urine culture  1/26/2017 3/27/2018    Magnesium  1/31/2017 3/27/2018    Renal function panel  1/31/2017 3/27/2018      Instructions      1. Labs: tomorrow and next Tuesday renal US as already scheduled  Urine for me today      2.  Medications: no change for now        5. BP:  Take BP and pulse  twice daily for one week, record              Bring results  to next visit.              Goal :   <140/90        7. Please avoid or minimize all NSAIDS (ibuprofen, motrin, aleve, indocin, naprosyn) to minimize the risk to your kidneys    8. Return to clinic: 7 days labs as above       "

## 2017-01-26 NOTE — PATIENT INSTRUCTIONS
1. Labs: tomorrow and next Tuesday renal US as already scheduled  Urine for me today      2.  Medications: no change for now        5. BP:  Take BP and pulse  twice daily for one week, record              Bring results  to next visit.              Goal :   <140/90        7. Please avoid or minimize all NSAIDS (ibuprofen, motrin, aleve, indocin, naprosyn) to minimize the risk to your kidneys    8. Return to clinic: 7 days labs as above

## 2017-01-26 NOTE — Clinical Note
Eric trying to order a serum: CPK ( creatine phosphokinase level), but cannot find it. Please help me order this for next possible blood draw

## 2017-01-26 NOTE — LETTER
January 26, 2017      Karrie Vazquez MD  [address]  [place] [address]           [facility] - Nephrology  [address]  [place] [address]  Phone: [phone]  Fax: [phone]          Patient: Naty St   MR Number: [id number]   YOB: 1945   Date of Visit: 1/26/2017       Dear Dr. Karrie Vazquez:    Thank you for referring Naty St to me for evaluation. Attached you will find relevant portions of my assessment and plan of care.    If you have questions, please do not hesitate to call me. I look forward to following Naty St along with you.    Sincerely,    Mihaela Bazzi MD    Enclosure  CC:  No Recipients    If you would like to receive this communication electronically, please contact [email or web address] or [phone] to request more information on [employer] access.    For providers and/or their staff who would like to refer a patient to [facility], please contact us through our one-stop-shop provider referral line, [facility], at [phone].    If you feel you have received this communication in error or would no longer like to receive these types of communications, please e-mail [email or web address]

## 2017-01-26 NOTE — PROGRESS NOTES
Subjective:       Patient ID: Naty St is a 71 y.o. 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  She denies, LUTS, SOB, dysuria, hematuria, + peripheral edema now bilaterally, + 4  Since  Increase in serum crt patient has been receiving 1L NS infusion 2 x weekly  Review of Systems   Constitutional: Positive for fatigue and unexpected weight change. Negative for appetite change, chills and fever.        30 lb weight loss since 9/2016   HENT: Negative for congestion, facial swelling, hearing loss, nosebleeds and trouble swallowing.    Eyes: Negative for pain, discharge, redness and visual disturbance.   Respiratory: Negative for cough, chest tightness, shortness of breath and wheezing.    Cardiovascular: Positive for leg swelling. Negative for chest pain and palpitations.        Worsening bilateral peripheral edema   Gastrointestinal: Negative for abdominal pain, constipation, diarrhea, nausea and vomiting.   Endocrine: Negative for cold intolerance, heat intolerance and polydipsia.   Genitourinary: Negative for decreased urine volume, difficulty urinating, dysuria, flank pain, hematuria and urgency.   Musculoskeletal: Negative for arthralgias, back pain, joint swelling and myalgias.   Skin: Negative for color change, pallor, rash and wound.   Neurological: Negative for dizziness, tremors, seizures, syncope, speech difficulty, weakness and headaches.   Hematological: Negative for adenopathy. Does not bruise/bleed easily.   Psychiatric/Behavioral: Negative for agitation,  "behavioral problems, dysphoric mood, self-injury and sleep disturbance.       Objective:     Blood pressure (!) 140/70, pulse 62, height 5' 5" (1.651 m), weight 61.4 kg (135 lb 5.8 oz), SpO2 98 %.      Physical Exam   Constitutional: She is oriented to person, place, and time. She appears well-developed and well-nourished. No distress.   Chronically ill, swallow complexion, NAD,    HENT:   Head: Normocephalic and atraumatic.   Mouth/Throat: No oropharyngeal exudate.   Eyes: Conjunctivae and EOM are normal. Pupils are equal, round, and reactive to light. Right eye exhibits no discharge. Left eye exhibits no discharge. No scleral icterus.   Neck: Normal range of motion. Neck supple. No JVD present. No tracheal deviation present. No thyromegaly present.   Cardiovascular: Normal rate, regular rhythm and normal heart sounds.  Exam reveals no gallop.    No murmur heard.  + 4 bialteral nonpitting edema, unable to assess pulses   Pulmonary/Chest: Effort normal and breath sounds normal. No stridor. No respiratory distress. She has no wheezes. She has no rales. She exhibits no tenderness.   Abdominal: Soft. Bowel sounds are normal. She exhibits distension. She exhibits no mass. There is no tenderness. There is no rebound and no guarding. No hernia.   Bladder not palpable   Musculoskeletal: She exhibits no edema or tenderness.   Lymphadenopathy:     She has no cervical adenopathy.   Neurological: She is alert and oriented to person, place, and time. She exhibits normal muscle tone.   Skin: Skin is warm and dry. No rash noted. She is not diaphoretic. No erythema.   Psychiatric: She has a normal mood and affect. Judgment and thought content normal.   Nursing note and vitals reviewed.      Assessment:       1. STEFAN (acute kidney injury)    2. Calcium oxalate kidney stones    3. Urinary tract infection without hematuria, site unspecified    4. Essential hypertension    5. Incomplete bladder emptying    6. Hypocalcemia    7. 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     STEFAN superimposed on CKD. STEFAN may be related to use of Epidermal GF inhibitor, must consider other causes such as obstruction from tumor or nephrolithiasis, AIN, infection, vasculitis.  There have been case reports of rhabydomyolysis with denosumab. Will check cpk,    CKD likely related to longstanding  HTN, nephrosclerosis with smoking history, possible reduced renal mass from scarring/ obstruction with nephrolithiasis and UTIs,     Plan:  Renal US in and serologies ordered  UA Uc/s urine protein anaylsis  Would probably hold on further NS infusion with increasing peripheral edema, and increasing serum crt   Urine studies ordered as well,  Further recommendations including possible renal biopsy after above results available,     Repeat in one week  Lab Results   Component Value Date    CREATININE 1.9 (H) 01/24/2017     Protein Creatinine Ratios: ordered   No results found for: UTPCR    2. Acid-Base:  Repeat next week , will likely need sodium bicarb may need to consider a VBG as well  Would check magnesium while on epdermal growht factor inhibitor  Lab Results   Component Value Date     01/24/2017    K 4.2 01/24/2017    CO2 20 (L) 01/24/2017         3. Renal osteodystrophy: last PTH  Check with vit d serum albumin 2.7 corrected calcium 9.1  Lab Results   Component Value Date    CALCIUM 8.3 (L) 01/24/2017    PHOS 3.7 01/26/2016       4. Anemia:  As per hematology  Lab Results   Component Value Date    HGB 9.9 (L) 01/05/2017        5. HTN:        Medication: no change      Monitor BP as directed in instructions      7. Weight: Weight: 61.4 kg (135 lb 5.8 oz). she states that her daily weights   From reading flowsheets 30 lb weight loss since 9/2016  Hold on lasix at this time, despite peripheral edema she is  without respiratory symptoms.  Wt Readings from Last 3 Encounters:   01/26/17 61.4 kg (135 lb 5.8 oz)   01/24/17 61.4 kg (135 lb 5.8 oz)   12/19/16 51.7 kg (114 lb)       8. Lipid management:   Lab Results   Component Value Date    LDLCALC 81.6 12/17/2015         9. Diet:  Education/referral:  Hold until further results, would not protein restrict at this time, may consider Nepro      Sodium:      Potassium:      Phosphorus:      Protein:      Fluid:       10. ESRD planing: see above  W/u first       Education:       Access:      13.  Please avoid or minimize all NSAIDS (ibuprofen, motrin, aleve, indocin, naprosyn) to minimize the risk to your kidneys.      14. Follow up in : testing in am including US and labs, labs in 5 days, RTC 7 days

## 2017-01-27 ENCOUNTER — HOSPITAL ENCOUNTER (OUTPATIENT)
Dept: RADIOLOGY | Facility: HOSPITAL | Age: 72
Discharge: HOME | End: 2017-01-27
Attending: INTERNAL MEDICINE
Payer: MEDICARE

## 2017-01-27 DIAGNOSIS — N28.9 ACUTE RENAL DISEASE: ICD-10-CM

## 2017-01-27 DIAGNOSIS — N17.9 AKI (ACUTE KIDNEY INJURY): Primary | ICD-10-CM

## 2017-01-27 PROCEDURE — 76770 US EXAM ABDO BACK WALL COMP: CPT | Mod: 26,,, | Performed by: RADIOLOGY

## 2017-01-27 PROCEDURE — 76770 US EXAM ABDO BACK WALL COMP: CPT | Mod: TC

## 2017-01-27 RX ORDER — CALCIUM CARBONATE/VITAMIN D3 500-10/5ML
400 LIQUID (ML) ORAL 2 TIMES DAILY
COMMUNITY
Start: 2017-01-27 | End: 2017-02-01

## 2017-01-27 NOTE — PLAN OF CARE
Problem: Patient Care Overview  Goal: Discharge Needs Assessment  Outcome: Ongoing (interventions implemented as appropriate)  Pt tolerated 1L IVF without adverse effects. VSS. Provided AVS & verbalized understanding of RTC date. DC with family ambulating independently.

## 2017-01-29 RX ORDER — LORAZEPAM 1 MG/1
TABLET ORAL
Qty: 60 TABLET | Refills: 2 | Status: SHIPPED | OUTPATIENT
Start: 2017-01-29 | End: 2017-04-29 | Stop reason: SDUPTHER

## 2017-01-31 ENCOUNTER — HOSPITAL ENCOUNTER (OUTPATIENT)
Dept: RADIOLOGY | Facility: HOSPITAL | Age: 72
Discharge: HOME | End: 2017-01-31
Attending: INTERNAL MEDICINE
Payer: MEDICARE

## 2017-01-31 VITALS — WEIGHT: 133.88 LBS | HEIGHT: 65 IN | BODY MASS INDEX: 22.31 KG/M2

## 2017-01-31 DIAGNOSIS — C34.31 MALIGNANT NEOPLASM OF LOWER LOBE OF RIGHT LUNG: ICD-10-CM

## 2017-01-31 PROCEDURE — A9552 F18 FDG: HCPCS

## 2017-01-31 PROCEDURE — 78815 PET IMAGE W/CT SKULL-THIGH: CPT | Mod: TC

## 2017-01-31 PROCEDURE — 78815 PET IMAGE W/CT SKULL-THIGH: CPT | Mod: 26,PS,, | Performed by: RADIOLOGY

## 2017-02-01 ENCOUNTER — PATIENT MESSAGE (OUTPATIENT)
Dept: OTOLARYNGOLOGY | Facility: CLINIC | Age: 72
End: 2017-02-01

## 2017-02-01 ENCOUNTER — OFFICE VISIT (OUTPATIENT)
Dept: UROGYNECOLOGY | Facility: CLINIC | Age: 72
End: 2017-02-01
Attending: OBSTETRICS & GYNECOLOGY
Payer: MEDICARE

## 2017-02-01 VITALS
SYSTOLIC BLOOD PRESSURE: 120 MMHG | DIASTOLIC BLOOD PRESSURE: 70 MMHG | HEIGHT: 65 IN | WEIGHT: 135.81 LBS | BODY MASS INDEX: 22.63 KG/M2

## 2017-02-01 DIAGNOSIS — R39.15 URINARY URGENCY: ICD-10-CM

## 2017-02-01 DIAGNOSIS — N32.81 OAB (OVERACTIVE BLADDER): Primary | ICD-10-CM

## 2017-02-01 LAB
BILIRUB SERPL-MCNC: ABNORMAL MG/DL
BLOOD URINE, POC: ABNORMAL
COLOR, POC UA: ABNORMAL
GLUCOSE UR QL STRIP: ABNORMAL
KETONES UR QL STRIP: ABNORMAL
LEUKOCYTE ESTERASE URINE, POC: ABNORMAL
NITRITE, POC UA: ABNORMAL
PH, POC UA: 5
PROTEIN, POC: ABNORMAL
SPECIFIC GRAVITY, POC UA: 1.01
UROBILINOGEN, POC UA: ABNORMAL

## 2017-02-01 PROCEDURE — 87086 URINE CULTURE/COLONY COUNT: CPT

## 2017-02-01 PROCEDURE — 99999 PR PBB SHADOW E&M-EST. PATIENT-LVL V: CPT | Mod: PBBFAC,,, | Performed by: OBSTETRICS & GYNECOLOGY

## 2017-02-01 PROCEDURE — 1160F RVW MEDS BY RX/DR IN RCRD: CPT | Mod: S$GLB,,, | Performed by: OBSTETRICS & GYNECOLOGY

## 2017-02-01 PROCEDURE — 81002 URINALYSIS NONAUTO W/O SCOPE: CPT | Mod: S$GLB,,, | Performed by: OBSTETRICS & GYNECOLOGY

## 2017-02-01 PROCEDURE — 3078F DIAST BP <80 MM HG: CPT | Mod: S$GLB,,, | Performed by: OBSTETRICS & GYNECOLOGY

## 2017-02-01 PROCEDURE — 51701 INSERT BLADDER CATHETER: CPT | Mod: S$GLB,,, | Performed by: OBSTETRICS & GYNECOLOGY

## 2017-02-01 PROCEDURE — 99213 OFFICE O/P EST LOW 20 MIN: CPT | Mod: 25,S$GLB,, | Performed by: OBSTETRICS & GYNECOLOGY

## 2017-02-01 PROCEDURE — 1157F ADVNC CARE PLAN IN RCRD: CPT | Mod: S$GLB,,, | Performed by: OBSTETRICS & GYNECOLOGY

## 2017-02-01 PROCEDURE — 3074F SYST BP LT 130 MM HG: CPT | Mod: S$GLB,,, | Performed by: OBSTETRICS & GYNECOLOGY

## 2017-02-01 PROCEDURE — 1126F AMNT PAIN NOTED NONE PRSNT: CPT | Mod: S$GLB,,, | Performed by: OBSTETRICS & GYNECOLOGY

## 2017-02-01 PROCEDURE — 1159F MED LIST DOCD IN RCRD: CPT | Mod: S$GLB,,, | Performed by: OBSTETRICS & GYNECOLOGY

## 2017-02-01 RX ORDER — CIPROFLOXACIN HYDROCHLORIDE 3 MG/ML
SOLUTION/ DROPS OPHTHALMIC
COMMUNITY
Start: 2017-01-31 | End: 2017-09-15

## 2017-02-01 NOTE — PROGRESS NOTES
Subjective:       Patient ID: Naty St is a 71 y.o. female.    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 She was Initially seen for concern of prolapse, not found to have significant prolapse. She was started on Mirabegron 25 mg which was increased to 50 mg.  She presents today for follow up. Some moderate improvement in urinary symptoms but still continues of have urinary urgency and frequency. She has a recent renal sonogram which showed evidence of a non obstructing stone in the lower pole of the right kidney.        HPI   Ohs Peq Urogyn Hpi      Question    9/19/2016  9:01 AM CDT     General Urogynecology: Are you experiencing the following?       Dysuria (painful urination)  No     Nocturia:  waking up at night to empty your bladder   Yes     If you answered yes to the previous question, how many times does this happen per night?  1-2, new      Enuresis (urine loss during sleep)  No     Dribbling urine after you urinate  No     Hematuria (urine appears red)  No     Type of stream  Weak     Urinary Incontinence (General): Are you experiencing the following?       Past consultation for incontinence: Have you ever seen someone for the evaluation of incontinence?  No     If you answered yes to the previous question, please select all the therapies you have tried.   None of the above     Please note the effectiveness of the therapies.       Need to wear protection to keep clothes dry   No     If you answered yes to the previous question, please daniel the protection you use.   Pads     If you wear protection, how much wetness is typically on each pad?  Light     If you wear protection, how often do you have to change per day, if applicable?   2     Stress Symptoms: Are you  "experiencing the following?       Leakage of urine with cough, laugh and/or sneeze  No     If you answered yes to the previous question, what is the frequency in days, weeks and/or months?  Never     Leakage of urine with sex  No     Leakage of urine with bending/ lifting  No     Leakage of urine with briskly walking or jogging  No     If you lose urine for any other reason not previously mentioned, please note it below, if applicable.        Urge Symptoms: Are you experiencing the following?       Urgency ("got to go" feeling)  No     Urge: How frequently do you feel an urge to urinate (feeling like you "gotta go" to the bathroom and can't wait)  Daily     Do you experience a leakage of urine when you have a feeling of urgency?   No     Leakage of urine when unaware  No     Past use of anticholinergics (medications used to treat overactive bladder)  No     If you answered yes to the previous question, please daniel the anticholinergics you have used:        Have you ever used Mirbetriq (aka Mirabegron)?   No     Prolapse Symptoms: Are you experiencing any of the following?        Falling out/ Bulging/ Heaviness in the vagina  Yes     Vaginal/ Abdominal Pain/ Pressure  No     Need to strain/ Push to void  No     Need to wait on the toilet before you void  No     Unusual position to urinate (using your hands to push back the vaginal bulge)  No      Sensation of incomplete emptying  No     Past use of pessary device  No     If you answered yes to the previous question, please list the devices you have used below.        Bowel Symptoms: Are you experiencing any of the following?       Constipation  Yes,      Diarrhea   Yes,      Hematochezia (bloody stool)  No     Incomplete evacuation of stool  No     Involuntary loss of formed stool  No     Fecal smearing/urgency  No     Involuntary loss of gas  No     Vaginal Symptoms: Are you experiencing any of the following?        Abnormal vaginal bleeding   No     Vaginal dryness "  No     Sexually active   No     Dyspareunia (painful intercourse)  No     Estrogen use   No     HPI reviewed and confirmed with patient       GYN & OB History  No LMP recorded (lmp unknown). Patient has had a hysterectomy.   Date of Last Pap: No result found    OB History    Para Term  AB SAB TAB Ectopic Multiple Living   4 4              # Outcome Date GA Lbr Elkin/2nd Weight Sex Delivery Anes PTL Lv   4 Para            3 Para            2 Para            1 Para                 Past Medical History   Diagnosis Date    Blood clot in vein 2016    Breast cancer     Cataract     History of breast cancer     History of pancreatic cancer     Hx of psychiatric care     Hypertension     Pancreatic cancer     Posterior capsular opacification, left eye     Psychiatric problem     Seizures 2016    Therapy      Past Surgical History   Procedure Laterality Date    Pancreatic cancer       whipple    Kidney stone surgery  --laser    Left eswl  12    Cysto and right ureteral stent  12    Oophorectomy  2012     Laparoscopic BSO, lysis of adhesions, cystoscopy greater than 35mins     Breast lumpectomy       left    Ecoli  2010     removal of blockage, done throat, then through the liver.    Whipple procedure w/ laparoscopy      Breast lumpectomy       left    Cataract extraction Bilateral      yag OU    Colonoscopy N/A 2015     Procedure: COLONOSCOPY;  Surgeon: Alex Carmona MD;  Location: [facility] ([address] [place] [address]);  Service: Endoscopy;  Laterality: N/A;  2 year f/u    Hysterectomy      Cholecystectomy      Bone biospy  3/9/16    Brain surgery  16     tumor removal 16         Current Outpatient Prescriptions:     amitriptyline (ELAVIL) 25 MG tablet, Take one tablet by mouth once daily, Disp: 30 tablet, Rfl: 3    amlodipine (NORVASC) 5 MG tablet, Take 1 tablet (5 mg total) by mouth once daily., Disp: 30 tablet, Rfl: 11     atenolol (TENORMIN) 50 MG tablet, Take one tablet by mouth once daily, Disp: 30 tablet, Rfl: 4    ciprofloxacin HCl (CILOXAN) 0.3 % ophthalmic solution, , Disp: , Rfl:     clindamycin phosphate 1% (CLINDAGEL) 1 % gel, Apply topically 2 (two) times daily., Disp: 60 g, Rfl: 6    CREON CpDR, TAKE ONE CAPSULE BY MOUTH THREE TIMES A DAY WITH MEALS (Patient taking differently: TAKE ONE CAPSULE BY MOUTH THREE TIMES A DAY 30 MINUTES BEFORE MEALS), Disp: 270 capsule, Rfl: 4    denosumab (XGEVA) 120 mg/1.7 mL (70 mg/mL) Soln, Inject 120 mg into the skin every 28 days., Disp: , Rfl:     dronabinol (MARINOL) 5 MG capsule, TAKE ONE CAPSULE BY MOUTH TWO TIMES A DAY BEFORE MEALS, Disp: 60 capsule, Rfl: 2    enoxaparin (LOVENOX) 100 mg/mL Syrg, Inject 1 mL (100 mg total) into the skin once daily., Disp: 30 Syringe, Rfl: 3    ERGOCALCIFEROL, VITAMIN D2, (VITAMIN D ORAL), Take by mouth., Disp: , Rfl:     erlotinib (TARCEVA) 150 MG tablet, Take 1 tablet (150 mg total) by mouth once daily., Disp: 30 tablet, Rfl: 6    escitalopram oxalate (LEXAPRO) 10 MG tablet, Take 1 tablet (10 mg total) by mouth once daily., Disp: 30 tablet, Rfl: 2    levetiracetam (KEPPRA) 750 MG Tab, Take 1 tablet (750 mg total) by mouth 2 (two) times daily., Disp: 60 tablet, Rfl: 3    lorazepam (ATIVAN) 1 MG tablet, TAKE ONE TABLET BY MOUTH TWICE DAILY, Disp: 60 tablet, Rfl: 2    magnesium oxide 400 mg Cap, Take 400 mg by mouth 2 (two) times daily., Disp: , Rfl:     mirabegron 50 mg Tb24, Take 1 tablet (50 mg total) by mouth once daily., Disp: 30 tablet, Rfl: 11    multivitamin (THERAGRAN) per tablet, Take 1 tablet by mouth once daily., Disp: , Rfl:     hydrocodone-acetaminophen 5-325mg (NORCO) 5-325 mg per tablet, Take 1 tablet by mouth every 6 (six) hours as needed for Pain., Disp: 20 tablet, Rfl: 0    ondansetron (ZOFRAN) 8 MG tablet, Take 1 tablet (8 mg total) by mouth 4 (four) times daily as needed for Nausea., Disp: 60 tablet, Rfl: 2  No  current facility-administered medications for this visit.     Facility-Administered Medications Ordered in Other Visits:     denosumab (XGEVA) solution 120 mg, 120 mg, Subcutaneous, 1 time in Clinic/HOD, Karrie Vazquez MD    Review of Systems  Review of Systems   Constitutional: Negative.  Negative for activity change, appetite change, chills, diaphoresis, fatigue, fever and unexpected weight change.   HENT: Negative.    Eyes: Negative.    Respiratory: Negative.  Negative for cough.    Cardiovascular: Negative.    Gastrointestinal: Positive for constipation and diarrhea. Negative for abdominal distention, abdominal pain, anal bleeding, blood in stool, nausea, rectal pain and vomiting.   Endocrine: Negative.    Genitourinary: Positive for frequency. Negative for decreased urine volume, difficulty urinating, dyspareunia, enuresis, flank pain, genital sores, hematuria, menstrual problem, pelvic pain, urgency, vaginal bleeding, vaginal discharge and vaginal pain.        Prolapse  + vaginal bulge   +vaginal pressure    Musculoskeletal: Negative for back pain.   Skin: Negative for color change, pallor, rash and wound.   Allergic/Immunologic: Negative for environmental allergies, food allergies and immunocompromised state.   Hematological: Negative for adenopathy. Does not bruise/bleed easily.   Psychiatric/Behavioral: Negative for agitation, behavioral problems, confusion and sleep disturbance.           Objective:     Physical Exam   Constitutional: She is oriented to person, place, and time. She appears well-developed.   HENT:   Head: Normocephalic and atraumatic.   Eyes: Conjunctivae and EOM are normal.   Neck: Normal range of motion. Neck supple.   Cardiovascular: Normal rate, regular rhythm, S1 normal, S2 normal, normal heart sounds and intact distal pulses.    Pulmonary/Chest: Effort normal and breath sounds normal. She exhibits no tenderness.   Abdominal: Soft. Bowel sounds are normal. She exhibits no distension  and no mass. There is no hepatosplenomegaly, splenomegaly or hepatomegaly. There is no tenderness. There is no rigidity, no rebound, no guarding and no CVA tenderness. Hernia confirmed negative in the right inguinal area and confirmed negative in the left inguinal area.   Genitourinary: Pelvic exam was performed with patient supine. Rectum normal, vagina normal, skenes normal and bartholins normal. Right labia normal and left labia normal. Urethra exhibits hypermobility. Urethra exhibits no urethral caruncle, no urethral diverticulum and no urethral mass. Right bartholin is not enlarged and not tender. Left bartholin is not enlarged and not tender. Rectal exam shows resting tone normal and active tone normal. Rectal exam shows no external hemorrhoid, no fissure, no tenderness, anal tone normal and no dovetailing. Guaiac negative stool. There is atrophy in the vagina. No foreign body, tenderness, bleeding, unspecified prolapse of vaginal walls, fistula, mesh exposure or lavator tenderness in the vagina. No vaginal discharge found. Right adnexum displays no tenderness. Left adnexum displays no tenderness. Cervix exhibits absence. Uterus is absent.   PVR: 25 ML  Empty cough stress test: Negative.  Kegel: 2/5    POP-Q  Aa: -2 Ba: -2 C: -5   GH: 3 PB: 2 TVL: 8   Ap: -1 Bp: -1 D:                      Musculoskeletal: Normal range of motion.   Lymphadenopathy:     She has no axillary adenopathy.        Right: No inguinal adenopathy present.        Left: No inguinal adenopathy present.   Neurological: She is alert and oriented to person, place, and time. She has normal strength and normal reflexes. Cranial nerves II through XII intact. No cranial nerve deficit.   Skin: Skin is warm, dry and intact.   Psychiatric: She has a normal mood and affect. Her speech is normal and behavior is normal. Judgment normal. Cognition and memory are normal.          Assessment:        1. OAB (overactive bladder)    2. Urinary urgency              Plan:      1. OAB slightly improved  --Empty bladder every 3 hours.  Empty well: wait a minute, lean forward on toilet.    --Avoid dietary irritants (see sheet).  Keep diary x 3-5 days to determine your irritants.  --KEGELS: do 10 in AM and 10 in PM, holding each x 10 seconds.  When you feel urge to go, STOP, KEGEL, and when urge has passed, then go to bathroom.  Start PT.    --URGE: continue Myrbetriq to  50 mg daily.    2. Vaginal atrophy (dryness):   Use REPLENS or REFRESH OTC: 1/2 applicator full in vagina twice a week.      3. Incomplete bladder emtyping: normal PVR on exam        Approximately 30 min were spent in consult, 90 % in discussion.     Aide Carvalho DO  Female Pelvic Medicine and Reconstructive Surgery  [facility] [place], [place]

## 2017-02-01 NOTE — MR AVS SNAPSHOT
[facility]  [address] [facility] [address]  Phone: [phone]                  [address]   2017 1:30 PM   Office Visit    Description:  Female : 1945   Provider:  Aide Carvalho DO   Department:  [facility]           Reason for Visit     Follow-up           Diagnoses this Visit        Comments    OAB (overactive bladder)    -  Primary     Urinary urgency                To Do List           Future Appointments        Provider Department Dept Phone    2017 1:00 PM LAB, HEMONC SAME DAY [facility] [phone]    2017 2:00 PM MD Daniel Keith - Hematology Oncology [phone]    2017 2:45 PM INJECTION, [facility] INFUSION [facility] [phone]    2/3/2017 10:00 AM Tano Quintero, PhD Daniel - [facility] [phone]    3/13/2017 12:00 PM [facility] MRI WIDE BORE [facility] [phone]      Goals (5 Years of Data)     None      [facility]     [facility] Care [facility] -  Assistance  Registered nurses in the [facility] provide clinical advisement, health education, appointment booking, and other advisory services.  Call for this free service at [phone].             Medications           Message regarding Medications     Verify the changes and/or additions to your medication regime listed below are the same as discussed with your clinician today.  If any of these changes or additions are incorrect, please notify your healthcare provider.             Verify that the below list of medications is an accurate representation of the medications you are currently taking.  If none reported, the list may be blank. If incorrect, please contact your healthcare provider. Carry this list with you in case of emergency.           Current Medications     amitriptyline (ELAVIL) 25 MG tablet Take one tablet by mouth once daily    amlodipine  "(NORVASC) 5 MG tablet Take 1 tablet (5 mg total) by mouth once daily.    atenolol (TENORMIN) 50 MG tablet Take one tablet by mouth once daily    ciprofloxacin HCl (CILOXAN) 0.3 % ophthalmic solution     clindamycin phosphate 1% (CLINDAGEL) 1 % gel Apply topically 2 (two) times daily.    CREON CpDR TAKE ONE CAPSULE BY MOUTH THREE TIMES A DAY WITH MEALS    denosumab (XGEVA) 120 mg/1.7 mL (70 mg/mL) Soln Inject 120 mg into the skin every 28 days.    dronabinol (MARINOL) 5 MG capsule TAKE ONE CAPSULE BY MOUTH TWO TIMES A DAY BEFORE MEALS    enoxaparin (LOVENOX) 100 mg/mL Syrg Inject 1 mL (100 mg total) into the skin once daily.    ERGOCALCIFEROL, VITAMIN D2, (VITAMIN D ORAL) Take by mouth.    erlotinib (TARCEVA) 150 MG tablet Take 1 tablet (150 mg total) by mouth once daily.    escitalopram oxalate (LEXAPRO) 10 MG tablet Take 1 tablet (10 mg total) by mouth once daily.    levetiracetam (KEPPRA) 750 MG Tab Take 1 tablet (750 mg total) by mouth 2 (two) times daily.    lorazepam (ATIVAN) 1 MG tablet TAKE ONE TABLET BY MOUTH TWICE DAILY    magnesium oxide 400 mg Cap Take 400 mg by mouth 2 (two) times daily.    mirabegron 50 mg Tb24 Take 1 tablet (50 mg total) by mouth once daily.    multivitamin (THERAGRAN) per tablet Take 1 tablet by mouth once daily.    hydrocodone-acetaminophen 5-325mg (NORCO) 5-325 mg per tablet Take 1 tablet by mouth every 6 (six) hours as needed for Pain.    ondansetron (ZOFRAN) 8 MG tablet Take 1 tablet (8 mg total) by mouth 4 (four) times daily as needed for Nausea.           Clinical Reference Information           Vital Signs - Last Recorded  Most recent update: 2/1/2017  1:32 PM by Doris Aguero MA    BP Ht Wt LMP BMI    120/70 5' 5" (1.651 m) 61.6 kg (135 lb 12.9 oz) (LMP Unknown) 22.6 kg/m2      Blood Pressure          Most Recent Value    BP  120/70      Allergies as of 2/1/2017     Tobradex [Tobramycin-dexamethasone]    Iodinated Contrast Media - Iv Dye    Morphine    Latex    Phenytoin " Sodium Extended      Immunizations Administered on Date of Encounter - 2/1/2017     None      Orders Placed During Today's Visit      Normal Orders This Visit    Ambulatory consult to Physical Therapy     POCT URINE DIPSTICK WITHOUT MICROSCOPE     Urine culture

## 2017-02-02 ENCOUNTER — OFFICE VISIT (OUTPATIENT)
Dept: HEMATOLOGY/ONCOLOGY | Facility: CLINIC | Age: 72
End: 2017-02-02
Payer: MEDICARE

## 2017-02-02 ENCOUNTER — INFUSION (OUTPATIENT)
Dept: INFUSION THERAPY | Facility: HOSPITAL | Age: 72
End: 2017-02-02
Attending: INTERNAL MEDICINE
Payer: MEDICARE

## 2017-02-02 ENCOUNTER — DOCUMENTATION ONLY (OUTPATIENT)
Dept: NEPHROLOGY | Facility: CLINIC | Age: 72
End: 2017-02-02

## 2017-02-02 VITALS
DIASTOLIC BLOOD PRESSURE: 74 MMHG | RESPIRATION RATE: 17 BRPM | OXYGEN SATURATION: 100 % | WEIGHT: 134.5 LBS | SYSTOLIC BLOOD PRESSURE: 119 MMHG | HEIGHT: 66 IN | HEART RATE: 64 BPM | BODY MASS INDEX: 21.62 KG/M2 | TEMPERATURE: 98 F

## 2017-02-02 DIAGNOSIS — N17.9 ACUTE RENAL FAILURE, UNSPECIFIED ACUTE RENAL FAILURE TYPE: Primary | ICD-10-CM

## 2017-02-02 DIAGNOSIS — C34.31 MALIGNANT NEOPLASM OF LOWER LOBE OF RIGHT LUNG: Primary | ICD-10-CM

## 2017-02-02 DIAGNOSIS — I82.403 ACUTE DEEP VEIN THROMBOSIS (DVT) OF BOTH LOWER EXTREMITIES, UNSPECIFIED VEIN: ICD-10-CM

## 2017-02-02 DIAGNOSIS — L70.8 ACNEIFORM RASH: ICD-10-CM

## 2017-02-02 DIAGNOSIS — C79.51 SECONDARY CANCER OF BONE: ICD-10-CM

## 2017-02-02 DIAGNOSIS — C34.31 MALIGNANT NEOPLASM OF LOWER LOBE OF RIGHT LUNG: ICD-10-CM

## 2017-02-02 DIAGNOSIS — Z51.11 ENCOUNTER FOR ANTINEOPLASTIC CHEMOTHERAPY: ICD-10-CM

## 2017-02-02 DIAGNOSIS — N17.9 ACUTE RENAL FAILURE, UNSPECIFIED ACUTE RENAL FAILURE TYPE: ICD-10-CM

## 2017-02-02 DIAGNOSIS — C79.31 SECONDARY ADENOCARCINOMA OF BRAIN: ICD-10-CM

## 2017-02-02 LAB — BACTERIA UR CULT: NO GROWTH

## 2017-02-02 PROCEDURE — 1126F AMNT PAIN NOTED NONE PRSNT: CPT | Mod: S$GLB,,, | Performed by: INTERNAL MEDICINE

## 2017-02-02 PROCEDURE — 3078F DIAST BP <80 MM HG: CPT | Mod: S$GLB,,, | Performed by: INTERNAL MEDICINE

## 2017-02-02 PROCEDURE — 1157F ADVNC CARE PLAN IN RCRD: CPT | Mod: S$GLB,,, | Performed by: INTERNAL MEDICINE

## 2017-02-02 PROCEDURE — 1159F MED LIST DOCD IN RCRD: CPT | Mod: S$GLB,,, | Performed by: INTERNAL MEDICINE

## 2017-02-02 PROCEDURE — 96401 CHEMO ANTI-NEOPL SQ/IM: CPT

## 2017-02-02 PROCEDURE — 63600175 PHARM REV CODE 636 W HCPCS: Performed by: INTERNAL MEDICINE

## 2017-02-02 PROCEDURE — 99499 UNLISTED E&M SERVICE: CPT | Mod: S$GLB,,, | Performed by: INTERNAL MEDICINE

## 2017-02-02 PROCEDURE — 99215 OFFICE O/P EST HI 40 MIN: CPT | Mod: S$GLB,,, | Performed by: INTERNAL MEDICINE

## 2017-02-02 PROCEDURE — 99999 PR PBB SHADOW E&M-EST. PATIENT-LVL V: CPT | Mod: PBBFAC,,, | Performed by: INTERNAL MEDICINE

## 2017-02-02 PROCEDURE — 1160F RVW MEDS BY RX/DR IN RCRD: CPT | Mod: S$GLB,,, | Performed by: INTERNAL MEDICINE

## 2017-02-02 PROCEDURE — 3074F SYST BP LT 130 MM HG: CPT | Mod: S$GLB,,, | Performed by: INTERNAL MEDICINE

## 2017-02-02 RX ADMIN — DENOSUMAB 120 MG: 120 INJECTION SUBCUTANEOUS at 02:02

## 2017-02-02 NOTE — Clinical Note
RTC in 4 weeks for CBC, CMP, and Xgeva (no MD appointment). RTC in 8 weeks to see Dr. Vazquez with CBC, CMP, PET scan and Xgeva.

## 2017-02-02 NOTE — PROGRESS NOTES
"PATIENT: Naty St  MRN: [id number]  DATE: 2/2/2017    Subjective:     Chief complaint:  Chief Complaint   Patient presents with    Lung Cancer    "i'm stressed."    intermittent rashes--scabs in nose x 1 month     butt rash       Oncologic History:  Ms. Naty St was admitted to the hospital between 01/25/2016 and 01/28/2016. She has a history of pancreatic cancer 17 years ago, breast cancer and meningioma, presented to the hospital complaining of loss of consciousness. The patient apparently was in her normal state of health and she stood up to go to the bathroom and fell to the floor. The patient's daughter helped the mother up in the bathroom and noted that her mother's upper extremities were shaking and eye rolling. No reports of bowel or bladder incontinence, tongue biting or rolling. The second episode lasted about four minutes and she was extremely lethargic following that. Apparently, the patient had a meningioma resection done in the past; however, recently, underwent neurosurgical resection with Dr. 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    Additional immunohistochemical stains were performed which revealed the tumor cells to be are positive for TTF1 and negative for ER and GCDFP. The morphology and TTF1 positivity are most consistent with lung primary.    Of note, imaging scan at the end of January 2016 revealed a mass in the lung at 2 cm in the medial aspect of the apical segment of the right upper lobe abutting the mediastinum at the level of the azygous vein and abutting and possibly encasing the segmental bronchi and vessels of the apical segment of the right upper lobe and no pleural fluid was present. Also, there is an enlarged right paratracheal lymph node. No evidence of any metastatic disease at the pancreatic site with postoperative changes post Whipple " "disease  Her PET Scan from 2/15/16 reveal "Hypermetabolic mass in the right lung apex consistent with a primary malignancy. Hypermetabolic mediastinal lymph nodes consistent with metastatic disease. Right sacral hypermetabolic lesion consistent with metastatic disease, noting additional mildly sclerotic lesions in multiple vertebral bodies which do not demonstrate abnormal hypermetabolism  She underwent IR bone biopsy which revealed metastatic adenocarcinoma of lung origin. 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  She has completed focal RT to brain lesions in March 2016.    PET scan from 6/6/16 shows "Dramatic almost complete response to therapy."  Lovenox started after US lower ext 6/7/16 shows Acute complete occlusion of one of the left posterior tibial vein.Remote partial thrombus of the proximal left superficial femoral vein.  She is on Tarceva.   12/6/16 PET scan reveals "Stable right upper lobe lesion and right hilar lymph node. No new lesions identified. Trace left pleural effusion".  1/27/17 Renal u/s "Medical renal disease. Nonobstructive right nephrolithiasis"  1/31/17 PET - "Right upper lobe nodule and right hilar lymph node similar and very low grade activity.  There is no definite evidence of recurrence."    Interval History: Ms. St returns for follow up. She reports feeling "stressed". She is taking Ativan prn which helps. She only took Lexapro one time and did not take again because it caused heart rate to increase. She reports a rash to her buttocks and inside nose that comes and goes. Otherwise she feels well. No fever.   She denies any nausea, vomiting, diarrhea, constipation, abdominal pain,  chest pain, shortness of breath, leg swelling, cough, fatigue, pain, headache, dizziness, or mood changes. She is accompanied by her . She had follow up with Urology yesterday but is unaware of recommendations noted.    ECOG Performance Status:   ECOG SCORE    0 - Fully active-able to carry on " "all pre-disease performance without restriction          PMFSH: all information reviewed and updated as relevant to today's visit    Review of Systems:   Review of Systems   Constitutional: Negative for activity change, appetite change, fatigue and fever.   HENT: Negative for mouth sores, nosebleeds and sore throat.    Eyes: Negative for visual disturbance.   Respiratory: Negative for cough and shortness of breath.    Cardiovascular: Negative for chest pain, palpitations and leg swelling.   Gastrointestinal: Negative for abdominal pain, constipation, diarrhea, nausea and vomiting.   Genitourinary: Negative for difficulty urinating and frequency.   Musculoskeletal: Negative for arthralgias and back pain.   Skin: Negative for rash.   Neurological: Negative for dizziness, numbness and headaches.   Hematological: Negative for adenopathy. Does not bruise/bleed easily.   Psychiatric/Behavioral: Negative for confusion and sleep disturbance. The patient is nervous/anxious.    All other systems reviewed and are negative.        Objective:      Vitals:   Vitals:    02/02/17 1343   BP: 119/74   Pulse: 64   Resp: 17   Temp: 98.2 °F (36.8 °C)   TempSrc: Oral   SpO2: 100%   Weight: 61 kg (134 lb 7.7 oz)   Height: 5' 6" (1.676 m)     BMI: Body mass index is 21.71 kg/(m^2).      Physical Exam:   Physical Exam   Constitutional: She is oriented to person, place, and time. She appears well-developed and well-nourished. No distress.   HENT:   Head: Normocephalic.   Right Ear: External ear normal.   Left Ear: External ear normal.   Mouth/Throat: No oropharyngeal exudate.   No sinus tenderness.   Eyes: Conjunctivae and lids are normal. Pupils are equal, round, and reactive to light. No scleral icterus.   Neck: Trachea normal and normal range of motion. Neck supple. No thyromegaly present.   Cardiovascular: Normal rate, regular rhythm and normal heart sounds.    Pulmonary/Chest: Effort normal and breath sounds normal.   Abdominal: Soft. " Normal appearance and bowel sounds are normal. She exhibits no distension and no mass. There is no tenderness.   Musculoskeletal: Normal range of motion.   Lymphadenopathy:        Head (right side): No submental and no submandibular adenopathy present.        Head (left side): No submental and no submandibular adenopathy present.     She has no cervical adenopathy.   Neurological: She is alert and oriented to person, place, and time. She has normal strength and normal reflexes. No cranial nerve deficit. Gait normal.   Skin: Skin is warm, dry and intact. Rash (one small pinpointable area of erythema to the tip of right nare; no rash to buttocks but inner folds with mild irritation . ) noted. No bruising noted. No cyanosis. Nails show no clubbing.   Psychiatric: She has a normal mood and affect. Her speech is normal and behavior is normal.   Nursing note and vitals reviewed.        Laboratory Data:  WBC   Date Value Ref Range Status   02/02/2017 3.51 (L) 3.90 - 12.70 K/uL Final     Hemoglobin   Date Value Ref Range Status   02/02/2017 10.1 (L) 12.0 - 16.0 g/dL Final     POC Hematocrit   Date Value Ref Range Status   01/12/2016 25 (L) 36 - 54 %PCV Final     Hematocrit   Date Value Ref Range Status   02/02/2017 31.4 (L) 37.0 - 48.5 % Final     Platelets   Date Value Ref Range Status   02/02/2017 297 150 - 350 K/uL Final     Gran #   Date Value Ref Range Status   02/02/2017 1.8 1.8 - 7.7 K/uL Final     Comment:     The ANC is based on a white cell differential from an   automated cell counter. It has not been microscopically   reviewed for the presence of abnormal cells. Clinical   correlation is required.       Gran%   Date Value Ref Range Status   06/19/2016 77.4 (H) 38.0 - 73.0 % Final       Chemistry        Component Value Date/Time     02/02/2017 1301    K 3.9 02/02/2017 1301     (H) 02/02/2017 1301    CO2 20 (L) 02/02/2017 1301    BUN 16 02/02/2017 1301    CREATININE 1.8 (H) 02/02/2017 1301    GLU 90  02/02/2017 1301        Component Value Date/Time    CALCIUM 8.4 (L) 02/02/2017 1301    ALKPHOS 120 02/02/2017 1301    AST 39 02/02/2017 1301    ALT 71 (H) 02/02/2017 1301    BILITOT 0.7 02/02/2017 1301              Assessment/Plan:     1. Malignant neoplasm of lower lobe of right lung    2. Secondary adenocarcinoma of brain    3. Secondary cancer of bone    4. Encounter for antineoplastic chemotherapy    5. Acute deep vein thrombosis (DVT) of both lower extremities, unspecified vein    6. Acute renal failure, unspecified acute renal failure type    7. Acneiform rash        Plan:    1,2,3,4. She is doing well and will continue with tarceva. She will also receive Xgeva today and in 4 weeks. I will see her in 8 weeks with repeat scan and labs. Message to -RTC in 4 weeks for CBC, CMP, and Xgeva (no MD appointment). RTC in 8 weeks to see Dr. Vazquez with CBC, CMP, PET scan and Xgeva.   5. Continue Lovenox.   6. Stable, monitor for now. Keep f/u with Nephrologist as scheduled. Keep f/u with Urology as well.   7. Neosporin to nostrils prn. Yane's Butt paste to inner buttock folds. Keep area clean and dry.    Med and Orders:  Orders Placed This Encounter    NM PET CT Routine Skull to Mid Thigh    CBC Oncology    Comprehensive metabolic panel       Follow Up:  Return in about 8 weeks (around 3/30/2017).        More than 25 mins were spent during this encounter, greater than 50% was spent in direct counseling and/or coordination of care.   Patient was also seen and examined by Dr. Vazquez who agrees with above plan. Patient is in agreement with the proposed treatment plan. All questions were answered to the patient's satisfaction. Pt knows to call clinic if anything is needed before the next clinic visit.    JASMIN Greer  Hematology and Medical Oncology    STAFF NOTE:  I have reviewed the notes, assessments, and/or procedures performed by the nurse practitioner, as above.  I have personally interviewed  the patient at the beside, and rounded with the nurse practitioner. I formulated the plan of care.  I concur with her documentation of Naty St.

## 2017-02-03 ENCOUNTER — PATIENT MESSAGE (OUTPATIENT)
Dept: PSYCHIATRY | Facility: CLINIC | Age: 72
End: 2017-02-03

## 2017-02-06 ENCOUNTER — PATIENT MESSAGE (OUTPATIENT)
Dept: UROGYNECOLOGY | Facility: CLINIC | Age: 72
End: 2017-02-06

## 2017-02-06 ENCOUNTER — TELEPHONE (OUTPATIENT)
Dept: UROGYNECOLOGY | Facility: CLINIC | Age: 72
End: 2017-02-06

## 2017-02-06 NOTE — TELEPHONE ENCOUNTER
----- Message from Miesha Mata sent at 2/6/2017  8:50 AM CST -----  Contact: self  Pt needing a call back, she have questions and can be reached at [phone].

## 2017-02-06 NOTE — TELEPHONE ENCOUNTER
Pt wanted to know did Dr Carvalho  state her overactive  bladder was or was not improving. Informed pt that I looked over the last offices notes and it stated she was improving. Pt voiced understanding and call ended.

## 2017-02-09 DIAGNOSIS — C79.31 SECONDARY ADENOCARCINOMA OF BRAIN: ICD-10-CM

## 2017-02-10 RX ORDER — LEVETIRACETAM 750 MG/1
750 TABLET ORAL 2 TIMES DAILY
Qty: 60 TABLET | Refills: 3 | Status: SHIPPED | OUTPATIENT
Start: 2017-02-10 | End: 2017-05-09 | Stop reason: SDUPTHER

## 2017-02-13 ENCOUNTER — OFFICE VISIT (OUTPATIENT)
Dept: NEPHROLOGY | Facility: CLINIC | Age: 72
End: 2017-02-13
Payer: MEDICARE

## 2017-02-13 ENCOUNTER — LAB VISIT (OUTPATIENT)
Dept: LAB | Facility: HOSPITAL | Age: 72
End: 2017-02-13
Attending: INTERNAL MEDICINE
Payer: MEDICARE

## 2017-02-13 VITALS
BODY MASS INDEX: 21.64 KG/M2 | HEART RATE: 64 BPM | DIASTOLIC BLOOD PRESSURE: 68 MMHG | HEIGHT: 66 IN | SYSTOLIC BLOOD PRESSURE: 110 MMHG | WEIGHT: 134.69 LBS | OXYGEN SATURATION: 91 %

## 2017-02-13 DIAGNOSIS — N20.0 CALCIUM OXALATE KIDNEY STONES: ICD-10-CM

## 2017-02-13 DIAGNOSIS — N17.9 AKI (ACUTE KIDNEY INJURY): ICD-10-CM

## 2017-02-13 DIAGNOSIS — N18.4 CKD (CHRONIC KIDNEY DISEASE) STAGE 4, GFR 15-29 ML/MIN: ICD-10-CM

## 2017-02-13 DIAGNOSIS — N17.9 AKI (ACUTE KIDNEY INJURY): Primary | ICD-10-CM

## 2017-02-13 DIAGNOSIS — I10 ESSENTIAL HYPERTENSION: Chronic | ICD-10-CM

## 2017-02-13 DIAGNOSIS — E83.42 HYPOMAGNESEMIA: ICD-10-CM

## 2017-02-13 LAB
BACTERIA #/AREA URNS AUTO: ABNORMAL /HPF
BILIRUB UR QL STRIP: NEGATIVE
CLARITY UR REFRACT.AUTO: ABNORMAL
COLOR UR AUTO: YELLOW
CREAT UR-MCNC: 91 MG/DL
GLUCOSE UR QL STRIP: NEGATIVE
HGB UR QL STRIP: ABNORMAL
KETONES UR QL STRIP: NEGATIVE
LEUKOCYTE ESTERASE UR QL STRIP: ABNORMAL
MICROSCOPIC COMMENT: ABNORMAL
NITRITE UR QL STRIP: POSITIVE
PH UR STRIP: 6 [PH] (ref 5–8)
PROT UR QL STRIP: ABNORMAL
PROT UR-MCNC: 22 MG/DL
PROT/CREAT RATIO, UR: 0.24
RBC #/AREA URNS AUTO: 3 /HPF (ref 0–4)
SP GR UR STRIP: 1.01 (ref 1–1.03)
SQUAMOUS #/AREA URNS AUTO: 1 /HPF
URN SPEC COLLECT METH UR: ABNORMAL
UROBILINOGEN UR STRIP-ACNC: NEGATIVE EU/DL
WBC #/AREA URNS AUTO: >100 /HPF (ref 0–5)
WBC CLUMPS UR QL AUTO: ABNORMAL

## 2017-02-13 PROCEDURE — 81001 URINALYSIS AUTO W/SCOPE: CPT

## 2017-02-13 PROCEDURE — 1157F ADVNC CARE PLAN IN RCRD: CPT | Mod: S$GLB,,, | Performed by: INTERNAL MEDICINE

## 2017-02-13 PROCEDURE — 82570 ASSAY OF URINE CREATININE: CPT

## 2017-02-13 PROCEDURE — 99999 PR PBB SHADOW E&M-EST. PATIENT-LVL III: CPT | Mod: PBBFAC,,, | Performed by: INTERNAL MEDICINE

## 2017-02-13 PROCEDURE — 1160F RVW MEDS BY RX/DR IN RCRD: CPT | Mod: S$GLB,,, | Performed by: INTERNAL MEDICINE

## 2017-02-13 PROCEDURE — 99213 OFFICE O/P EST LOW 20 MIN: CPT | Mod: S$GLB,,, | Performed by: INTERNAL MEDICINE

## 2017-02-13 PROCEDURE — 1126F AMNT PAIN NOTED NONE PRSNT: CPT | Mod: S$GLB,,, | Performed by: INTERNAL MEDICINE

## 2017-02-13 PROCEDURE — 3078F DIAST BP <80 MM HG: CPT | Mod: S$GLB,,, | Performed by: INTERNAL MEDICINE

## 2017-02-13 PROCEDURE — 99499 UNLISTED E&M SERVICE: CPT | Mod: S$GLB,,, | Performed by: INTERNAL MEDICINE

## 2017-02-13 PROCEDURE — 3074F SYST BP LT 130 MM HG: CPT | Mod: S$GLB,,, | Performed by: INTERNAL MEDICINE

## 2017-02-13 PROCEDURE — 1159F MED LIST DOCD IN RCRD: CPT | Mod: S$GLB,,, | Performed by: INTERNAL MEDICINE

## 2017-02-13 NOTE — PATIENT INSTRUCTIONS
1. Labs: labs today and in 3 months  24 hr stone risk when patient is able, no rush    2.  Medications: sodium bicarb 650 mg three times daily with food    Drink about 2 L fluid daily to keep kidneys flushed with h/o kidney stones    Boost renal formula or Nepro for nutironal support and simple whey or egg whites for added protein.  5. BP:  Take BP and pulse  twice daily for one week, record              Bring results  to next visit.              Goal :   <140/90      Rehydration fluid formula for diarrhea: one to one  Drink slowly over several hours  ( 8 oz of diarrhea, 8 oz of fluid)1 Liter of water  1/2 teaspoon table salt  6 teaspoons of suagr    7. Please avoid or minimize all NSAIDS (ibuprofen, motrin, aleve, indocin, naprosyn) to minimize the risk to your kidneys    8. Return to clinic: 3 months

## 2017-02-14 ENCOUNTER — TELEPHONE (OUTPATIENT)
Dept: NEPHROLOGY | Facility: CLINIC | Age: 72
End: 2017-02-14

## 2017-02-14 ENCOUNTER — TELEPHONE (OUTPATIENT)
Dept: PHARMACY | Facility: CLINIC | Age: 72
End: 2017-02-14

## 2017-02-14 DIAGNOSIS — N17.9 ACUTE RENAL FAILURE, UNSPECIFIED ACUTE RENAL FAILURE TYPE: ICD-10-CM

## 2017-02-14 DIAGNOSIS — N17.9 AKI (ACUTE KIDNEY INJURY): Primary | ICD-10-CM

## 2017-02-14 DIAGNOSIS — R82.81 PYURIA: ICD-10-CM

## 2017-02-14 NOTE — PROGRESS NOTES
UA  Clean 2 weeks ago now with nitrite + and wbc >100k and many wbc clumbps, patient was asymptomatic in office yesterday.  Will check Uc/s and repeat UA

## 2017-02-15 ENCOUNTER — LAB VISIT (OUTPATIENT)
Dept: LAB | Facility: HOSPITAL | Age: 72
End: 2017-02-15
Attending: INTERNAL MEDICINE
Payer: MEDICARE

## 2017-02-15 DIAGNOSIS — N17.9 AKI (ACUTE KIDNEY INJURY): ICD-10-CM

## 2017-02-15 DIAGNOSIS — N17.9 ACUTE RENAL FAILURE, UNSPECIFIED ACUTE RENAL FAILURE TYPE: ICD-10-CM

## 2017-02-15 DIAGNOSIS — R82.81 PYURIA: ICD-10-CM

## 2017-02-15 LAB
BACTERIA #/AREA URNS AUTO: ABNORMAL /HPF
BILIRUB UR QL STRIP: NEGATIVE
CAOX CRY UR QL COMP ASSIST: ABNORMAL
CLARITY UR REFRACT.AUTO: ABNORMAL
COLOR UR AUTO: YELLOW
GLUCOSE UR QL STRIP: NEGATIVE
HGB UR QL STRIP: NEGATIVE
KETONES UR QL STRIP: NEGATIVE
LEUKOCYTE ESTERASE UR QL STRIP: ABNORMAL
MICROSCOPIC COMMENT: ABNORMAL
NITRITE UR QL STRIP: POSITIVE
PH UR STRIP: 6 [PH] (ref 5–8)
PROT UR QL STRIP: NEGATIVE
SP GR UR STRIP: 1.01 (ref 1–1.03)
SQUAMOUS #/AREA URNS AUTO: 2 /HPF
URN SPEC COLLECT METH UR: ABNORMAL
UROBILINOGEN UR STRIP-ACNC: NEGATIVE EU/DL
WBC #/AREA URNS AUTO: >100 /HPF (ref 0–5)
WBC CLUMPS UR QL AUTO: ABNORMAL

## 2017-02-15 PROCEDURE — 87086 URINE CULTURE/COLONY COUNT: CPT

## 2017-02-15 PROCEDURE — 87186 SC STD MICRODIL/AGAR DIL: CPT | Mod: 59

## 2017-02-15 PROCEDURE — 81001 URINALYSIS AUTO W/SCOPE: CPT

## 2017-02-15 PROCEDURE — 87077 CULTURE AEROBIC IDENTIFY: CPT | Mod: 59

## 2017-02-15 PROCEDURE — 87088 URINE BACTERIA CULTURE: CPT

## 2017-02-18 LAB — BACTERIA UR CULT: NORMAL

## 2017-02-19 ENCOUNTER — HOSPITAL ENCOUNTER (OUTPATIENT)
Facility: HOSPITAL | Age: 72
Discharge: HOME | End: 2017-02-20
Attending: EMERGENCY MEDICINE | Admitting: INTERNAL MEDICINE
Payer: MEDICARE

## 2017-02-19 DIAGNOSIS — N30.00 ACUTE CYSTITIS WITHOUT HEMATURIA: ICD-10-CM

## 2017-02-19 DIAGNOSIS — R55 SYNCOPE, UNSPECIFIED SYNCOPE TYPE: ICD-10-CM

## 2017-02-19 DIAGNOSIS — R55 SYNCOPE AND COLLAPSE: ICD-10-CM

## 2017-02-19 DIAGNOSIS — E86.0 DEHYDRATION: ICD-10-CM

## 2017-02-19 DIAGNOSIS — N39.0 URINARY TRACT INFECTION WITHOUT HEMATURIA, SITE UNSPECIFIED: Primary | ICD-10-CM

## 2017-02-19 DIAGNOSIS — C34.31 MALIGNANT NEOPLASM OF LOWER LOBE OF RIGHT LUNG: ICD-10-CM

## 2017-02-19 LAB
ALBUMIN SERPL BCP-MCNC: 2.7 G/DL
ALP SERPL-CCNC: 124 U/L
ALT SERPL W/O P-5'-P-CCNC: 43 U/L
ANION GAP SERPL CALC-SCNC: 9 MMOL/L
AST SERPL-CCNC: 30 U/L
BACTERIA #/AREA URNS AUTO: ABNORMAL /HPF
BASOPHILS # BLD AUTO: 0.02 K/UL
BASOPHILS NFR BLD: 0.4 %
BILIRUB SERPL-MCNC: 1 MG/DL
BILIRUB UR QL STRIP: NEGATIVE
BUN SERPL-MCNC: 28 MG/DL
CALCIUM SERPL-MCNC: 8.3 MG/DL
CHLORIDE SERPL-SCNC: 113 MMOL/L
CLARITY UR REFRACT.AUTO: CLEAR
CO2 SERPL-SCNC: 19 MMOL/L
COLOR UR AUTO: YELLOW
CREAT SERPL-MCNC: 1.7 MG/DL
DIFFERENTIAL METHOD: ABNORMAL
EOSINOPHIL # BLD AUTO: 0.2 K/UL
EOSINOPHIL NFR BLD: 2.9 %
ERYTHROCYTE [DISTWIDTH] IN BLOOD BY AUTOMATED COUNT: 13.6 %
EST. GFR  (AFRICAN AMERICAN): 34.2 ML/MIN/1.73 M^2
EST. GFR  (NON AFRICAN AMERICAN): 29.7 ML/MIN/1.73 M^2
GLUCOSE SERPL-MCNC: 84 MG/DL
GLUCOSE UR QL STRIP: NEGATIVE
HCT VFR BLD AUTO: 33.3 %
HGB BLD-MCNC: 10.8 G/DL
HGB UR QL STRIP: NEGATIVE
KETONES UR QL STRIP: NEGATIVE
LEUKOCYTE ESTERASE UR QL STRIP: ABNORMAL
LYMPHOCYTES # BLD AUTO: 1.2 K/UL
LYMPHOCYTES NFR BLD: 23.5 %
MCH RBC QN AUTO: 29.8 PG
MCHC RBC AUTO-ENTMCNC: 32.4 %
MCV RBC AUTO: 92 FL
MICROSCOPIC COMMENT: ABNORMAL
MONOCYTES # BLD AUTO: 0.4 K/UL
MONOCYTES NFR BLD: 7.6 %
NEUTROPHILS # BLD AUTO: 3.3 K/UL
NEUTROPHILS NFR BLD: 65 %
NITRITE UR QL STRIP: POSITIVE
PH UR STRIP: 6 [PH] (ref 5–8)
PLATELET # BLD AUTO: 299 K/UL
PMV BLD AUTO: 10 FL
POTASSIUM SERPL-SCNC: 4 MMOL/L
PROT SERPL-MCNC: 6 G/DL
PROT UR QL STRIP: NEGATIVE
RBC # BLD AUTO: 3.62 M/UL
RBC #/AREA URNS AUTO: 1 /HPF (ref 0–4)
SODIUM SERPL-SCNC: 141 MMOL/L
SP GR UR STRIP: 1.01 (ref 1–1.03)
TROPONIN I SERPL DL<=0.01 NG/ML-MCNC: <0.006 NG/ML
URN SPEC COLLECT METH UR: ABNORMAL
UROBILINOGEN UR STRIP-ACNC: NEGATIVE EU/DL
WBC # BLD AUTO: 5.1 K/UL
WBC #/AREA URNS AUTO: 59 /HPF (ref 0–5)

## 2017-02-19 PROCEDURE — 81001 URINALYSIS AUTO W/SCOPE: CPT

## 2017-02-19 PROCEDURE — 25000003 PHARM REV CODE 250: Performed by: INTERNAL MEDICINE

## 2017-02-19 PROCEDURE — 99284 EMERGENCY DEPT VISIT MOD MDM: CPT | Mod: 25

## 2017-02-19 PROCEDURE — G0378 HOSPITAL OBSERVATION PER HR: HCPCS

## 2017-02-19 PROCEDURE — 84484 ASSAY OF TROPONIN QUANT: CPT

## 2017-02-19 PROCEDURE — 99284 EMERGENCY DEPT VISIT MOD MDM: CPT | Mod: ,,, | Performed by: EMERGENCY MEDICINE

## 2017-02-19 PROCEDURE — 80053 COMPREHEN METABOLIC PANEL: CPT

## 2017-02-19 PROCEDURE — 93005 ELECTROCARDIOGRAM TRACING: CPT

## 2017-02-19 PROCEDURE — 25000003 PHARM REV CODE 250: Performed by: EMERGENCY MEDICINE

## 2017-02-19 PROCEDURE — 36415 COLL VENOUS BLD VENIPUNCTURE: CPT

## 2017-02-19 PROCEDURE — 96360 HYDRATION IV INFUSION INIT: CPT

## 2017-02-19 PROCEDURE — 85025 COMPLETE CBC W/AUTO DIFF WBC: CPT

## 2017-02-19 PROCEDURE — 93010 ELECTROCARDIOGRAM REPORT: CPT | Mod: ,,, | Performed by: INTERNAL MEDICINE

## 2017-02-19 PROCEDURE — 63600175 PHARM REV CODE 636 W HCPCS: Performed by: INTERNAL MEDICINE

## 2017-02-19 RX ORDER — ONDANSETRON 2 MG/ML
4 INJECTION INTRAMUSCULAR; INTRAVENOUS EVERY 12 HOURS PRN
Status: DISCONTINUED | OUTPATIENT
Start: 2017-02-19 | End: 2017-02-20 | Stop reason: HOSPADM

## 2017-02-19 RX ORDER — LEVETIRACETAM 750 MG/1
750 TABLET ORAL 2 TIMES DAILY
Status: DISCONTINUED | OUTPATIENT
Start: 2017-02-19 | End: 2017-02-20 | Stop reason: HOSPADM

## 2017-02-19 RX ORDER — CEPHALEXIN 500 MG/1
500 CAPSULE ORAL
Status: COMPLETED | OUTPATIENT
Start: 2017-02-19 | End: 2017-02-19

## 2017-02-19 RX ORDER — ACETAMINOPHEN 325 MG/1
650 TABLET ORAL EVERY 8 HOURS PRN
Status: DISCONTINUED | OUTPATIENT
Start: 2017-02-19 | End: 2017-02-20 | Stop reason: HOSPADM

## 2017-02-19 RX ORDER — CIPROFLOXACIN 500 MG/1
500 TABLET ORAL EVERY 24 HOURS
Status: DISCONTINUED | OUTPATIENT
Start: 2017-02-19 | End: 2017-02-20

## 2017-02-19 RX ORDER — GLUCAGON 1 MG
1 KIT INJECTION
Status: DISCONTINUED | OUTPATIENT
Start: 2017-02-19 | End: 2017-02-20 | Stop reason: HOSPADM

## 2017-02-19 RX ORDER — LORAZEPAM 1 MG/1
1 TABLET ORAL 2 TIMES DAILY PRN
Status: DISCONTINUED | OUTPATIENT
Start: 2017-02-19 | End: 2017-02-20 | Stop reason: HOSPADM

## 2017-02-19 RX ORDER — DRONABINOL 2.5 MG/1
2.5 CAPSULE ORAL 2 TIMES DAILY
Status: DISCONTINUED | OUTPATIENT
Start: 2017-02-19 | End: 2017-02-20 | Stop reason: HOSPADM

## 2017-02-19 RX ORDER — AMITRIPTYLINE HYDROCHLORIDE 25 MG/1
25 TABLET, FILM COATED ORAL DAILY
Status: DISCONTINUED | OUTPATIENT
Start: 2017-02-20 | End: 2017-02-20 | Stop reason: HOSPADM

## 2017-02-19 RX ORDER — SODIUM CHLORIDE 9 MG/ML
INJECTION, SOLUTION INTRAVENOUS CONTINUOUS
Status: DISCONTINUED | OUTPATIENT
Start: 2017-02-19 | End: 2017-02-20 | Stop reason: HOSPADM

## 2017-02-19 RX ORDER — IBUPROFEN 200 MG
24 TABLET ORAL
Status: DISCONTINUED | OUTPATIENT
Start: 2017-02-19 | End: 2017-02-20 | Stop reason: HOSPADM

## 2017-02-19 RX ORDER — ERLOTINIB HYDROCHLORIDE 150 MG/1
150 TABLET, FILM COATED ORAL DAILY
Status: DISCONTINUED | OUTPATIENT
Start: 2017-02-20 | End: 2017-02-20 | Stop reason: HOSPADM

## 2017-02-19 RX ORDER — IBUPROFEN 200 MG
16 TABLET ORAL
Status: DISCONTINUED | OUTPATIENT
Start: 2017-02-19 | End: 2017-02-20 | Stop reason: HOSPADM

## 2017-02-19 RX ORDER — ENOXAPARIN SODIUM 100 MG/ML
100 INJECTION SUBCUTANEOUS DAILY
Status: DISCONTINUED | OUTPATIENT
Start: 2017-02-20 | End: 2017-02-20 | Stop reason: HOSPADM

## 2017-02-19 RX ORDER — ESCITALOPRAM OXALATE 5 MG/1
10 TABLET ORAL DAILY
Status: DISCONTINUED | OUTPATIENT
Start: 2017-02-19 | End: 2017-02-19

## 2017-02-19 RX ORDER — HYDROCODONE BITARTRATE AND ACETAMINOPHEN 5; 325 MG/1; MG/1
1 TABLET ORAL EVERY 6 HOURS PRN
Status: DISCONTINUED | OUTPATIENT
Start: 2017-02-19 | End: 2017-02-20 | Stop reason: HOSPADM

## 2017-02-19 RX ADMIN — CEPHALEXIN 500 MG: 500 CAPSULE ORAL at 12:02

## 2017-02-19 RX ADMIN — THERA TABS 1 TABLET: TAB at 02:02

## 2017-02-19 RX ADMIN — SODIUM CHLORIDE: 0.9 INJECTION, SOLUTION INTRAVENOUS at 01:02

## 2017-02-19 RX ADMIN — PANCRELIPASE 1 CAPSULE: 60000; 12000; 38000 CAPSULE, DELAYED RELEASE PELLETS ORAL at 04:02

## 2017-02-19 RX ADMIN — CIPROFLOXACIN HYDROCHLORIDE 500 MG: 500 TABLET, FILM COATED ORAL at 09:02

## 2017-02-19 RX ADMIN — LEVETIRACETAM 750 MG: 750 TABLET, FILM COATED ORAL at 10:02

## 2017-02-19 RX ADMIN — DRONABINOL 2.5 MG: 2.5 CAPSULE ORAL at 10:02

## 2017-02-19 NOTE — ED PROVIDER NOTES
"Encounter Date: 2/19/2017    SCRIBE #1 NOTE: I, David Sands, am scribing for, and in the presence of,  Dr. Pisano. I have scribed the following portions of the note - the EKG reading.       History     Chief Complaint   Patient presents with    Loss of Consciousness     Felt weak with a stomach ache.  Syncopal episode on toilet.  Patient orthostatic for EMS.     Review of patient's allergies indicates:   Allergen Reactions    Tobradex [tobramycin-dexamethasone] Swelling    Iodinated contrast media - iv dye Hives    Morphine Other (See Comments)     "shaking" and tremors    Latex Rash    Phenytoin sodium extended Other (See Comments) and Rash     HPI Comments: 71-year-old female with a history of active lung cancer on Tarceva, DVT on Lovenox, presents with syncopal episode while at [facility].  States that she was feeling unwell generally weak when she walked into the bathroom, she urinated, shortly after standing up from the toilet she apparently syncopized, next thing she remembered someone was calling her name and on the ground.  She had adequate return to mental status baseline per her .  She did not bite her tongue or urinated on herself.  She has had no chest pain, shortness of breath, or dyspnea on exertion today. At triage she mentioned having some abdominal discomfort.  At this time is asymptomatic.    In January 2016 had a meningioma removed, had a postoperative seizure and is now on Keppra and has had no subsequent seizures.  Routine labs revealed a UTI on 2/14, pan-sens e.coli, has not yet received tx.     The history is provided by the patient, medical records and a relative.     Past Medical History   Diagnosis Date    Blood clot in vein 06/2016    Breast cancer 1994    Cataract     History of breast cancer     History of pancreatic cancer     Hx of psychiatric care     Hypertension     Pancreatic cancer 1998    Posterior capsular opacification, left eye     Psychiatric problem  "    Seizures 01/25/2016    Therapy      Past Medical History Pertinent Negatives   Diagnosis Date Noted    Amblyopia 12/15/2015    Diabetes mellitus 12/15/2015    Diabetic retinopathy 12/15/2015    Glaucoma 12/15/2015    History of psychiatric hospitalization 10/20/2016    Macular degeneration 12/15/2015    Josie 10/20/2016    Psychiatric exam requested by authority 10/20/2016    Retinal detachment 12/15/2015    Sickle cell anemia 12/15/2015    Sickle cell trait 12/15/2015    Strabismus 12/15/2015    Suicide attempt 10/20/2016    Uveitis 12/15/2015     Past Surgical History   Procedure Laterality Date    Pancreatic cancer       whipple    Kidney stone surgery  2010--laser    Left eswl  6/20/12    Cysto and right ureteral stent  4/27/12    Oophorectomy  4/25/2012     Laparoscopic BSO, lysis of adhesions, cystoscopy greater than 35mins     Breast lumpectomy  1998     left    Ecoli  2010     removal of blockage, done throat, then through the liver.    Whipple procedure w/ laparoscopy  1998    Breast lumpectomy       left    Cataract extraction Bilateral 2004     yag OU    Colonoscopy N/A 11/13/2015     Procedure: COLONOSCOPY;  Surgeon: Alex Carmona MD;  Location: [facility] ([address] [place] [address]);  Service: Endoscopy;  Laterality: N/A;  2 year f/u    Hysterectomy  1974    Cholecystectomy  1998    Bone biospy  3/9/16    Brain surgery  1/12/16     tumor removal 1/12/16     Family History   Problem Relation Age of Onset    Breast cancer Mother     Lung cancer Mother     Leukemia Paternal Grandfather     Stomach cancer Paternal Grandmother     Colon cancer Neg Hx     Ovarian cancer Neg Hx      Social History   Substance Use Topics    Smoking status: Former Smoker     Packs/day: 0.25     Years: 0.50     Quit date: 9/26/1961    Smokeless tobacco: Never Used    Alcohol use No     Review of Systems   Constitutional: Positive for fatigue. Negative for chills and fever.   HENT: Negative for  congestion, rhinorrhea, sneezing and sore throat.    Eyes: Negative for visual disturbance.   Respiratory: Negative for cough, shortness of breath and wheezing.    Cardiovascular: Positive for leg swelling (chronic, unchanged). Negative for chest pain and palpitations.   Gastrointestinal: Negative for abdominal distention, abdominal pain, diarrhea, nausea and vomiting.   Genitourinary: Negative for decreased urine volume, difficulty urinating, dysuria, flank pain, frequency, hematuria, pelvic pain and vaginal bleeding.        Foul smelling urine   Musculoskeletal: Negative for arthralgias, back pain and myalgias.   Skin: Negative for rash.   Neurological: Positive for syncope and light-headedness. Negative for dizziness, weakness and numbness.   Hematological: Does not bruise/bleed easily.       Physical Exam   Initial Vitals   BP Pulse Resp Temp SpO2   02/19/17 1152 02/19/17 1152 02/19/17 1152 -- 02/19/17 1152   117/66 54 18  100 %     Physical Exam    Nursing note and vitals reviewed.  Constitutional: She appears well-developed and well-nourished. She is not diaphoretic. No distress.   Appears chronically ill but nontoxic   HENT:   Head: Normocephalic and atraumatic.   Mouth/Throat: Oropharynx is clear and moist. No oropharyngeal exudate.   Eyes: Conjunctivae and EOM are normal. Pupils are equal, round, and reactive to light. Right eye exhibits no discharge. Left eye exhibits no discharge. No scleral icterus.   Neck: Normal range of motion. Neck supple. No tracheal deviation present. No JVD present.   Cardiovascular: Normal rate, regular rhythm, normal heart sounds and intact distal pulses. Exam reveals no gallop.    No murmur heard.  Pulmonary/Chest: Breath sounds normal. No stridor. No respiratory distress. She has no wheezes. She has no rhonchi. She has no rales.   Abdominal: Soft. Bowel sounds are normal. She exhibits no distension. There is no tenderness. There is no rebound and no guarding.    Musculoskeletal: Normal range of motion. She exhibits edema (1+ b/l L, equal).   Neurological: She is alert and oriented to person, place, and time. She has normal strength. No cranial nerve deficit or sensory deficit.   Skin: Skin is warm and dry. No rash noted. No erythema. No pallor.         ED Course   Procedures  Labs Reviewed   CBC W/ AUTO DIFFERENTIAL - Abnormal; Notable for the following:        Result Value    RBC 3.62 (*)     Hemoglobin 10.8 (*)     Hematocrit 33.3 (*)     All other components within normal limits   COMPREHENSIVE METABOLIC PANEL - Abnormal; Notable for the following:     Chloride 113 (*)     CO2 19 (*)     BUN, Bld 28 (*)     Creatinine 1.7 (*)     Calcium 8.3 (*)     Albumin 2.7 (*)     eGFR if  34.2 (*)     eGFR if non  29.7 (*)     All other components within normal limits     EKG Readings: (Independently Interpreted)   Initial Reading: No STEMI. Previous EKG: Compared with most recent EKG Heart Rate: 60. Axis: Normal.   NSR, non-specific T wave inversions in anterior leads.          Medical Decision Making:   History:   Old Medical Records: I decided to obtain old medical records.  Independently Interpreted Test(s):   I have ordered and independently interpreted EKG Reading(s) - see prior notes  Clinical Tests:   Lab Tests: Ordered  Radiological Study: Ordered  Medical Tests: Ordered and Reviewed       APC / Resident Notes:   HO3 MDM:  72-year-old female with syncopal episode, extensive past medical history.  Differential includes dehydration from known urinary tract infection, vasovagal or situational syncope, anemia, dysrhythmia.    Ultrasound echo from August 2016 shows no evidence of heart failure.  History of DVT, currently anticoagulated, no chest pain or respiratory distress or dyspnea.  Plan to get basic labs, treat urinary tract infection, give fluids and check EKG and admit to oncology for observation.    Poncho Duvall  [facility] EM  PGY3  2/19/2017 12:03 PM      Update -     Labs show prerenal azotemia, o/w baseline.  EKG as above.  Consulted onc for observation.    Poncho Duvall  [employer] EM PGY3  2/19/2017 1:17 PM             Scribe Attestation:   Scribe #1: I performed the above scribed service and the documentation accurately describes the services I performed. I attest to the accuracy of the note.    Attending Attestation:   Physician Attestation Statement for Resident:  As the supervising MD   Physician Attestation Statement: I have personally seen and examined this patient.   I agree with the above history. -: 72 yo f, h/o lung CA on chemo, DVT on lovenox, s/p syncope episode in bathroom s/p urination.  No head trauma.  No cp/sob.  Pt feels at baseline now.  VSS though pt appears weak, chronically ill, frail.  Plan to check labs, EKG, admit oncology overnight for observation   As the supervising MD I agree with the above PE.    As the supervising MD I agree with the above treatment, course, plan, and disposition.  I have reviewed and agree with the residents interpretation of the following: lab data, x-rays and EKG.  I have reviewed the following: old records at this facility.          Physician Attestation for Scribe:  Physician Attestation Statement for Scribe #1: I, Dr. Pisano, reviewed documentation, as scribed by David Sands in my presence, and it is both accurate and complete.                 ED Course     Clinical Impression:   The primary encounter diagnosis was Urinary tract infection without hematuria, site unspecified. Diagnoses of Dehydration and Syncope, unspecified syncope type were also pertinent to this visit.    Disposition:   Disposition: Placed in Observation  Condition: Stable       Poncho Duvall MD  Resident  02/19/17 2122       Zaira Pisano MD  02/21/17 1021

## 2017-02-19 NOTE — IP AVS SNAPSHOT
[facility]  [address]  [facility] [address]  Phone: [phone]           Patient Discharge Instructions     Our goal is to set you up for success. This packet includes information on your condition, medications, and your home care. It will help you to care for yourself so you don't get sicker and need to go back to the hospital.     Please ask your nurse if you have any questions.        There are many details to remember when preparing to leave the hospital. Here is what you will need to do:    1. Take your medicine. If you are prescribed medications, review your Medication List in the following pages. You may have new medications to  at the pharmacy and others that you'll need to stop taking. Review the instructions for how and when to take your medications. Talk with your doctor or nurses if you are unsure of what to do.     2. Go to your follow-up appointments. Specific follow-up information is listed in the following pages. Your may be contacted by a transition nurse or clinical provider about future appointments. Be sure we have all of the phone numbers to reach you, if needed. Please contact your provider's office if you are unable to make an appointment.     3. Watch for warning signs. Your doctor or nurse will give you detailed warning signs to watch for and when to call for assistance. These instructions may also include educational information about your condition. If you experience any of warning signs to your health, call your doctor.               [facility]  Unless otherwise directed by your provider, please contact [facility], our nurse care line that is available for 24/7 assistance.     [phone] (toll-free)    Registered nurses in the [facility] provide clinical advisement, health education, appointment booking, and other advisory services.                    ** Verify the list of medication(s) below is accurate and up  to date. Carry this with you in case of emergency. If your medications have changed, please notify your healthcare provider.             Medication List      START taking these medications        Additional Info                      amoxicillin-clavulanate 500-125mg 500-125 mg Tab   Commonly known as:  AUGMENTIN   Quantity:  20 tablet   Refills:  0   Dose:  1 tablet    Instructions:  Take 1 tablet (500 mg total) by mouth 2 (two) times daily.     Begin Date    AM    Noon    PM    Bedtime         CHANGE how you take these medications        Additional Info                      CREON Cpdr   Quantity:  270 capsule   Refills:  4   What changed:  See the new instructions.   Generic drug:  lipase-protease-amylase 12,000-38,000-60,000 units    Last time this was given:  1 capsule on 2/20/2017 11:48 AM   Instructions:  TAKE ONE CAPSULE BY MOUTH THREE TIMES A DAY WITH MEALS     Begin Date    AM    Noon    PM    Bedtime         CONTINUE taking these medications        Additional Info                      amitriptyline 25 MG tablet   Commonly known as:  ELAVIL   Quantity:  30 tablet   Refills:  3   Comments:  This prescription was filled today(12/27/2016). Any refills authorized will be placed on file.    Instructions:  Take one tablet by mouth once daily     Begin Date    AM    Noon    PM    Bedtime       amlodipine 5 MG tablet   Commonly known as:  NORVASC   Quantity:  30 tablet   Refills:  11   Dose:  5 mg    Instructions:  Take 1 tablet (5 mg total) by mouth once daily.     Begin Date    AM    Noon    PM    Bedtime       atenolol 50 MG tablet   Commonly known as:  TENORMIN   Quantity:  30 tablet   Refills:  4    Instructions:  Take one tablet by mouth once daily     Begin Date    AM    Noon    PM    Bedtime       ciprofloxacin HCl 0.3 % ophthalmic solution   Commonly known as:  CILOXAN   Refills:  0      Begin Date    AM    Noon    PM    Bedtime       clindamycin phosphate 1% 1 % gel   Commonly known as:  CLINDAGEL    Quantity:  60 g   Refills:  6    Instructions:  Apply topically 2 (two) times daily.     Begin Date    AM    Noon    PM    Bedtime       denosumab 120 mg/1.7 mL (70 mg/mL) Soln   Commonly known as:  XGEVA   Refills:  0   Dose:  120 mg    Instructions:  Inject 120 mg into the skin every 28 days.     Begin Date    AM    Noon    PM    Bedtime       enoxaparin 100 mg/mL Syrg   Commonly known as:  LOVENOX   Quantity:  30 Syringe   Refills:  3   Dose:  1.5 mg/kg    Instructions:  Inject 1 mL (100 mg total) into the skin once daily.     Begin Date    AM    Noon    PM    Bedtime       erlotinib 150 MG tablet   Commonly known as:  TARCEVA   Quantity:  30 tablet   Refills:  6   Dose:  150 mg    Instructions:  Take 1 tablet (150 mg total) by mouth once daily.     Begin Date    AM    Noon    PM    Bedtime       escitalopram oxalate 10 MG tablet   Commonly known as:  LEXAPRO   Quantity:  30 tablet   Refills:  2   Dose:  10 mg    Instructions:  Take 1 tablet (10 mg total) by mouth once daily.     Begin Date    AM    Noon    PM    Bedtime       hydrocodone-acetaminophen 5-325mg 5-325 mg per tablet   Commonly known as:  NORCO   Quantity:  20 tablet   Refills:  0   Dose:  1 tablet    Instructions:  Take 1 tablet by mouth every 6 (six) hours as needed for Pain.     Begin Date    AM    Noon    PM    Bedtime       levetiracetam 750 MG Tab   Commonly known as:  KEPPRA   Quantity:  60 tablet   Refills:  3   Dose:  750 mg    Last time this was given:  750 mg on 2/20/2017  9:50 AM   Instructions:  Take 1 tablet (750 mg total) by mouth 2 (two) times daily.     Begin Date    AM    Noon    PM    Bedtime       lorazepam 1 MG tablet   Commonly known as:  ATIVAN   Quantity:  60 tablet   Refills:  2    Last time this was given:  1 mg on 2/20/2017 12:20 AM   Instructions:  TAKE ONE TABLET BY MOUTH TWICE DAILY     Begin Date    AM    Noon    PM    Bedtime       mirabegron 50 mg Tb24   Quantity:  30 tablet   Refills:  11   Dose:  50 mg     Instructions:  Take 1 tablet (50 mg total) by mouth once daily.     Begin Date    AM    Noon    PM    Bedtime       multivitamin per tablet   Commonly known as:  THERAGRAN   Refills:  0   Dose:  1 tablet    Instructions:  Take 1 tablet by mouth once daily.     Begin Date    AM    Noon    PM    Bedtime       ondansetron 8 MG tablet   Commonly known as:  ZOFRAN   Quantity:  60 tablet   Refills:  2   Dose:  8 mg    Instructions:  Take 1 tablet (8 mg total) by mouth 4 (four) times daily as needed for Nausea.     Begin Date    AM    Noon    PM    Bedtime       VITAMIN D ORAL   Refills:  0    Instructions:  Take by mouth.     Begin Date    AM    Noon    PM    Bedtime         STOP taking these medications     dronabinol 5 MG capsule   Commonly known as:  MARINOL            Where to Get Your Medications      These medications were sent to [address] [address]     Phone:  [phone]     amoxicillin-clavulanate 500-125mg 500-125 mg Tab                  Please bring to all follow up appointments:    1. A copy of your discharge instructions.  2. All medicines you are currently taking in their original bottles.  3. Identification and insurance card.    Please arrive 15 minutes ahead of scheduled appointment time.    Please call 24 hours in advance if you must reschedule your appointment and/or time.        Your Scheduled Appointments     Mar 02, 2017  1:30 PM CST   Non-Fasting Lab with LAB, HEMONC CANCER BLDG   [facility] ([facility])    [address]  [place] [address]   [phone]            Mar 02, 2017  2:30 PM CST   Injection with INJECTION, [facility] INFUSION   [facility] ([facility])    [address] [address]   [phone]            Mar 08, 2017 10:00 AM CST   New Physical Therapy Patient with Aline Pina, PT   [facility] [address] ([facility])     [address]  [facility] [address]   [phone]            Mar 13, 2017 12:00 PM CDT   Mri Brain Cont with [facility] WIDE BORE   [facility] ([facility] )    [address]  [facility] [address]   [phone]            Mar 13, 2017  1:30 PM CDT   Established Patient Visit with Salty Ferrera MD   [facility] - Neurosurgery 7th Fl ([facility] )    [address]  [place] [address] [phone]              Follow-up Information     Follow up with Karrie Vazquez MD On 3/31/2017.    Specialties:  Hematology and Oncology, Hematology    Why:  as scheduled by clinic    Contact information:    [address]  [place] [id number] [phone]          Discharge Instructions     Future Orders    Activity as tolerated     Call MD for:  difficulty breathing or increased cough     Call MD for:  persistent nausea and vomiting or diarrhea     Call MD for:  redness, tenderness, or signs of infection (pain, swelling, redness, odor or green/yellow discharge around incision site)     Call MD for:  severe uncontrolled pain     Call MD for:  temperature >100.4     Diet general     Questions:    Total calories:      Fat restriction, if any:      Protein restriction, if any:      Na restriction, if any:      Fluid restriction:      Additional restrictions:          Primary Diagnosis     Your primary diagnosis was:  Fainting      Admission Information     Date & Time Provider Department [facility]    2/19/2017 11:55 AM Troy East MD [facility] [id number]      Care Providers     Provider Role Specialty Primary office phone    Troy East MD Attending Provider Hematology and Oncology [phone]    Troy East MD Team Attending  Hematology and Oncology [phone]    Perri Mendieta MD Consulting Physician  Neurology [phone]      Your Vitals Were     BP Pulse Temp Resp Height Weight    148/66 (BP Location: Right arm, Patient Position:  "Sitting, BP Method: Automatic) 68 98.1 °F (36.7 °C) (Oral) 15 5' 6" (1.676 m) 61.2 kg (135 lb)    Last Period SpO2 BMI          (LMP Unknown) 98% 21.79 kg/m2        Recent Lab Values        12/20/2011 4/30/2012 12/13/2012 7/8/2013                  4:44 PM  9:55 AM  3:57 PM 12:26 PM        A1C 5.7 5.6 5.8 5.9                 Allergies as of 2/20/2017        Reactions    Tobradex [Tobramycin-dexamethasone] Swelling    Iodinated Contrast Media - Iv Dye Hives    Morphine Other (See Comments)    "shaking" and tremors    Latex Rash    Phenytoin Sodium Extended Other (See Comments), Rash      Advance Directives     An advance directive is a document which, in the event you are no longer able to make decisions for yourself, tells your healthcare team what kind of treatment you do or do not want to receive, or who you would like to make those decisions for you.  If you do not currently have an advance directive, [facility] encourages you to create one.  For more information call:  [phone]), [phone] ([phone]),  or log on to [email or web address].        Smoking Cessation     If you would like to quit smoking:   You may be eligible for free services if you are a [place] resident and started smoking cigarettes before September 1, 1988.  Call the Smoking Cessation [employer] ([facility]) toll free at (880) 662-0058 or (330) 693-7641.   Call 1-800-QUIT-NOW if you do not meet the above criteria.            Language Assistance Services     ATTENTION: Language assistance services are available, free of charge. Please call [phone].      ATENCIÓN: Si habla español, tiene a multani disposición servicios gratuitos de asistencia lingüística. Llame al [phone].     CHÚ Ý: N?u b?n nói Ti?ng Vi?t, có các d?ch v? h? tr? ngôn ng? mi?n phí dành cho b?n. G?i s? [phone].        Chronic Kindey Disease Education              [facility] complies with applicable Federal civil rights laws and " does not discriminate on the basis of race, color, national origin, age, disability, or sex.

## 2017-02-19 NOTE — H&P
"History & Physical  Medical Oncology    SUBJECTIVE:   Chief Complaint/Reason for Admission: syncope  History of Present Illness:  Naty St is a 71 y.o. female with PMH of DVT (on lovenox), pancreatic cancer 17 years ago, breast cancer and meningioma s/p neurosurgical resection on 01/12/2016 (on keppra) and stage IV NSCLC (on Tarceva) presenting to the ED today for an unwitnessed syncopal episode earlier today. Pt reports she was in her usual state of health until last night when she presented with nausea and vomiting x1. This morning while at mass she presented with bilateral lower quadrant pain. She went to the bathroom thinking that pain was related to an upcoming BM. Pt urinated (no BM) and upon standing she felt "weak" and lost consciousness. She denies having experienced nausea, SOB, chest pain, palpitations, diaphoresis or dizziness. According to family pt was confused immediately after event. She denies head or body trauma, bowel/bladder incontinence, tongue/lip biting. Is not taking any new meds.    Of significance, pt had a similar syncopal event on Jan 2016 while on Keppra thought to be due to a seizure, per pt. Pt was recently found to have a UTI (urine cx 2/15 grew pansensitive  E.coli) and has not received abx treatment. She currently denies dysuria, fever or chills.      Oncological Hx: Taken from Dr. Vazquez's clinic note 2/2/17    "Ms. Naty St was admitted to the hospital between 01/25/2016 and 01/28/2016. She has a history of pancreatic cancer 17 years ago, breast cancer and meningioma, presented to the hospital complaining of loss of consciousness. The patient apparently was in her normal state of health and she stood up to go to the bathroom and fell to the floor. The patient's daughter helped the mother up in the bathroom and noted that her mother's upper extremities were shaking and eye rolling. No reports of bowel or bladder incontinence, tongue biting or rolling. The second " "episode lasted about four minutes and she was extremely lethargic following that. Apparently, the patient had a meningioma resection done in the past; however, recently, underwent neurosurgical resection with Dr. 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      Additional immunohistochemical stains were performed which revealed the tumor cells to be are positive for TTF1 and negative for ER and GCDFP. The morphology and TTF1 positivity are most consistent with lung primary.      Of note, imaging scan at the end of January 2016 revealed a mass in the lung at 2 cm in the medial aspect of the apical segment of the right upper lobe abutting the mediastinum at the level of the azygous vein and abutting and possibly encasing the segmental bronchi and vessels of the apical segment of the right upper lobe and no pleural fluid was present. Also, there is an enlarged right paratracheal lymph node. No evidence of any metastatic disease at the pancreatic site with postoperative changes post Whipple disease    Her PET Scan from 2/15/16 reveal "Hypermetabolic mass in the right lung apex consistent with a primary malignancy. Hypermetabolic mediastinal lymph nodes consistent with metastatic disease. Right sacral hypermetabolic lesion consistent with metastatic disease, noting additional mildly sclerotic lesions in multiple vertebral bodies which do not demonstrate abnormal hypermetabolism    She underwent IR bone biopsy which revealed metastatic adenocarcinoma of lung origin. 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    She has completed focal RT to brain lesions in March 2016.      PET scan from 6/6/16 shows "Dramatic almost complete response to therapy."    Lovenox started after US lower ext 6/7/16 shows Acute complete occlusion of one of the left posterior tibial vein.Remote partial thrombus of the proximal left " "superficial femoral vein.    She is on Tarceva.     12/6/16 PET scan reveals "Stable right upper lobe lesion and right hilar lymph node. No new lesions identified. Trace left pleural effusion".    1/27/17 Renal u/s "Medical renal disease. Nonobstructive right nephrolithiasis"    1/31/17 PET - "Right upper lobe nodule and right hilar lymph node similar and very low grade activity.  There is no definite evidence of recurrence."    Past Medical History   Diagnosis Date    Blood clot in vein 06/2016    Breast cancer 1994    Cataract     History of breast cancer     History of pancreatic cancer     Hx of psychiatric care     Hypertension     Pancreatic cancer 1998    Posterior capsular opacification, left eye     Psychiatric problem     Seizures 01/25/2016    Therapy       Past Surgical History   Procedure Laterality Date    Pancreatic cancer       whipple    Kidney stone surgery  2010--laser    Left eswl  6/20/12    Cysto and right ureteral stent  4/27/12    Oophorectomy  4/25/2012     Laparoscopic BSO, lysis of adhesions, cystoscopy greater than 35mins     Breast lumpectomy  1998     left    Ecoli  2010     removal of blockage, done throat, then through the liver.    Whipple procedure w/ laparoscopy  1998    Breast lumpectomy       left    Cataract extraction Bilateral 2004     yag OU    Colonoscopy N/A 11/13/2015     Procedure: COLONOSCOPY;  Surgeon: Alex Carmona MD;  Location: [facility] ([address] [place] [address]);  Service: Endoscopy;  Laterality: N/A;  2 year f/u    Hysterectomy  1974    Cholecystectomy  1998    Bone biospy  3/9/16    Brain surgery  1/12/16     tumor removal 1/12/16      Current Facility-Administered Medications on File Prior to Encounter   Medication Dose Route Frequency Provider Last Rate Last Dose    denosumab (XGEVA) solution 120 mg  120 mg Subcutaneous 1 time in Clinic/HOD Karrie Vazquez MD         Current Outpatient Prescriptions on File Prior to Encounter   Medication Sig " Dispense Refill    amitriptyline (ELAVIL) 25 MG tablet Take one tablet by mouth once daily 30 tablet 3    amlodipine (NORVASC) 5 MG tablet Take 1 tablet (5 mg total) by mouth once daily. 30 tablet 11    atenolol (TENORMIN) 50 MG tablet Take one tablet by mouth once daily 30 tablet 4    ciprofloxacin HCl (CILOXAN) 0.3 % ophthalmic solution       clindamycin phosphate 1% (CLINDAGEL) 1 % gel Apply topically 2 (two) times daily. 60 g 6    CREON CpDR TAKE ONE CAPSULE BY MOUTH THREE TIMES A DAY WITH MEALS (Patient taking differently: TAKE ONE CAPSULE BY MOUTH THREE TIMES A DAY 30 MINUTES BEFORE MEALS) 270 capsule 4    denosumab (XGEVA) 120 mg/1.7 mL (70 mg/mL) Soln Inject 120 mg into the skin every 28 days.      dronabinol (MARINOL) 5 MG capsule TAKE ONE CAPSULE BY MOUTH TWO TIMES A DAY BEFORE MEALS 60 capsule 2    enoxaparin (LOVENOX) 100 mg/mL Syrg Inject 1 mL (100 mg total) into the skin once daily. 30 Syringe 3    ERGOCALCIFEROL, VITAMIN D2, (VITAMIN D ORAL) Take by mouth.      erlotinib (TARCEVA) 150 MG tablet Take 1 tablet (150 mg total) by mouth once daily. 30 tablet 6    escitalopram oxalate (LEXAPRO) 10 MG tablet Take 1 tablet (10 mg total) by mouth once daily. 30 tablet 2    hydrocodone-acetaminophen 5-325mg (NORCO) 5-325 mg per tablet Take 1 tablet by mouth every 6 (six) hours as needed for Pain. 20 tablet 0    levetiracetam (KEPPRA) 750 MG Tab Take 1 tablet (750 mg total) by mouth 2 (two) times daily. 60 tablet 3    lorazepam (ATIVAN) 1 MG tablet TAKE ONE TABLET BY MOUTH TWICE DAILY 60 tablet 2    mirabegron 50 mg Tb24 Take 1 tablet (50 mg total) by mouth once daily. 30 tablet 11    multivitamin (THERAGRAN) per tablet Take 1 tablet by mouth once daily.      ondansetron (ZOFRAN) 8 MG tablet Take 1 tablet (8 mg total) by mouth 4 (four) times daily as needed for Nausea. 60 tablet 2      Review of patient's allergies indicates:   Allergen Reactions    Tobradex [tobramycin-dexamethasone] Swelling  "   Iodinated contrast media - iv dye Hives    Morphine Other (See Comments)     "shaking" and tremors    Latex Rash    Phenytoin sodium extended Other (See Comments) and Rash       Family History   Problem Relation Age of Onset    Breast cancer Mother     Lung cancer Mother     Leukemia Paternal Grandfather     Stomach cancer Paternal Grandmother     Colon cancer Neg Hx     Ovarian cancer Neg Hx        reports that she quit smoking about 55 years ago. She has a 0.12 pack-year smoking history. She has never used smokeless tobacco. She reports that she does not drink alcohol or use illicit drugs.     Review of Systems:   Constitutional: Denies fevers, weight loss, chills, or weakness.  Eyes: Denies changes in vision.  ENT: Denies dysphagia, nasal discharge, ear pain or discharge.  Cardiovascular: Denies chest pain, palpitations, orthopnea, or claudication.  Respiratory: Denies shortness of breath, cough, hemoptysis, or wheezing.  GI:  POSITIVE for nausea/vomiting, Denies hematochezia, melena, abdominal pain, or changes in appetite.  : Denies dysuria, incontinence, or hematuria.  Musculoskeletal: Denies joint pain or myalgias.  Skin/breast: Denies rashes, lumps, lesions, or discharge.  Neurologic: Denies headache, dizziness, vertigo, or paresthesias.  Psychiatric: Denies changes in mood or hallucinations.  Endocrine: Denies polyuria, polydipsia, heat/cold intolerance.  Hematologic/Lymph: Denies lymphadenopathy, easy bruising or easy bleeding.  Allergic/Immunologic: Denies rash, rhinitis.     OBJECTIVE:     Vital Signs (Most Recent):  Temp: 98.1 °F (36.7 °C) (02/19/17 1300)  Pulse: 74 (02/19/17 1338)  Resp: 18 (02/19/17 1152)  BP: 127/62 (02/19/17 1338)  SpO2: 100 % (02/19/17 1152) Vital Signs (24h Range):  Temp:  [98.1 °F (36.7 °C)] 98.1 °F (36.7 °C)  Pulse:  [54-74] 74  Resp:  [18] 18  SpO2:  [100 %] 100 %  BP: (117-139)/(62-66) 127/62     I & O (24h):  No intake or output data in the 24 hours ending " 02/19/17 1428     Physical Exam:  General: well developed, well nourished, no distress  Eyes: conjunctivae/corneas clear. PERRL..  HENT: Head:normocephalic, atraumatic. Ears:hearing grossly normal bilaterally. Nose: no discharge. Throat: lips, mucosa, and tongue normal; teeth and gums normal and no throat erythema.  Neck: supple, symmetrical, trachea midline, no JVD  Lungs:  clear to auscultation bilaterally and normal respiratory effort  Cardiovascular: Heart: regular rate and rhythm, S1, S2 normal, no murmur, click, rub or gallop. Chest Wall: no tenderness. Extremities: no cyanosis or edema, or clubbing. Pulses: 2+ and symmetric.  Abdomen/Rectal: Abdomen: soft, non-tender non-distented; bowel sounds normal; no masses,  no organomegaly. Rectal: not examined  Skin: Skin color, texture, turgor normal.   Musculoskeletal:no clubbing, cyanosis  Neurologic: Normal strength and tone. No focal numbness or weakness. Cranial nerves grossly intact.  Psych/Behavioral:  Normal. and Alert and oriented, appropriate affect.    Laboratory:  CBC/Anemia Labs: Coags:      Recent Labs  Lab 02/19/17  1239   WBC 5.10   HGB 10.8*   HCT 33.3*      MCV 92   RDW 13.6    No results for input(s): INR, APTT in the last 168 hours.    Invalid input(s): PT     Chemistries: ABG:     Recent Labs  Lab 02/13/17  1055 02/19/17  1239     141 141   K 4.1  4.1 4.0   *  116* 113*   CO2 17*  17* 19*   BUN 18  18 28*   CREATININE 1.7*  1.7* 1.7*   CALCIUM 8.3*  8.3* 8.3*   PROT  --  6.0   BILITOT  --  1.0   ALKPHOS  --  124   ALT  --  43   AST  --  30   MG 1.6  1.6  --    PHOS 2.8  2.8  --     No results for input(s): PH, PCO2, PO2, HCO3, POCSATURATED, BE in the last 168 hours.     POCT Glucose: HbA1c:    No results for input(s): POCTGLUCOSE in the last 168 hours. Hemoglobin A1C   Date Value Ref Range Status   07/08/2013 5.9 4.0 - 6.2 % Final   12/13/2012 5.8 4.0 - 6.2 % Final   04/30/2012 5.6 4.0 - 6.2 % Final        Cardiac  "Enzymes: Ejection Fractions:    No results for input(s): CPK, CPKMB, MB, TROPONINI in the last 72 hours. EF   Date Value Ref Range Status   08/19/2016 55 55 - 65    02/14/2014 65          Diagnostic Results:      ASSESSMENT/PLAN:     Present on Admission:   Syncopal episode  - 2/2 to seizure vs stroke vs orthostatic hypotension vs arrhythmia vs intracerebral mass  - Possible seizure event given hx of prior event 1 yr ago. Event unwitnessed.Will continue keppra.  - Event possibly secondary to orthostatics in setting of vol depletion 2/2 to emesis and UTI in combination with worsening renal function and antihypertensive meds.   - will get orthostatic vital signs  - Will hold antihypertensive meds  - start on cont IV fluids.   - Arrhythmia is also a likely differential dx. Pt yazan in ED likely 2/2 to atenolol. Will hold med. - Start on cardiac monitor.   - EKG in ED revealed NSR and no STEMI.  - f/up troponin, BNP  - F/UP head CT  - Continue Keppra       Hypertension  - BP at goal  - will hold home norvasc and atenolol to avoid hypotension  - will restart meds as needed  - will consider lower dose/renally dosing atenolol given pt's bradycardia and possible contribution to syncopal episode today.     Meningioma   Secondary adenocarcinoma of brain  - s/p surgical resection 1/2016  - continue home Keppra for seizure prophylaxis     Malignant neoplasm of lower lobe of right lung  - f/up Dr. sullivan  - continue Tarceva  150mg PO daily  - on Xgeva q 4wks (last on 2/2/17), daily mag and ca supplements  - follow-up and replete lytes PRN     UTI (urinary tract infection)  - Urine cx 2/15- pan sensitive E. Coli  - s/p 1 dose cephalexin in ED  - Will f/up on UA  - Will start cipro     STEFAN (acute kidney injury) on CKD  - Cr 1.7 with baseline Cr ~1.2 back in 12/2016  - Recently eval by Nephrology Dr. Bazzi 2/13/17: "may be related to use of Epidermal GF inhibitor,with normal serologies, and benign urine sediment.  At this " "time would Not perform a renal biopsy unless worsening proteinuria"   - Will start on IV fluids to prevent worsening renal failure in setting of hypovolemia (hx of vomiting and recent UTI)  - avoid NSAIDs and nephrotoxic meds    DVT  - will restart lovenox SQ once intracranial bleed is ruled out with head CT    Diet: Adult regular  DVT PPx: Will start lovenox after Head CT r/o intracranial hemorrhage    Case discussed with Dr. Beaver. Staff attestation to follow.  Raquel Taylor PGY-1        Please reassign to Dr. East  "

## 2017-02-19 NOTE — PROGRESS NOTES
Below vitals were taken as ordered assessing for orthostatic blood pressures.       02/19/17 1657 02/19/17 1701 02/19/17 1703   Vital Signs   Pulse 61 69 67   Heart Rate Source Monitor Monitor Monitor   BP (!) 149/72 136/70 135/64   BP Location Right arm Right arm Right arm   BP Method Automatic Automatic Automatic   Patient Position Lying Sitting Standing

## 2017-02-19 NOTE — NURSING
Admit note:  Pt arrived from Emergency . Pt oriented to room and call light.      Fall note:  Pt ambulates independently. Bed in lowest position, nonskid footwear on pt, call light and possessions within reach, side rails up x2. Pt verbalizes understanding to call for assistance.     Skin note:  Skin intact. No evidence of breakdown noted. Pt stated she sometimes get a rash to her buttocks, but it is not there currently.        Will continue to monitor.

## 2017-02-20 VITALS
BODY MASS INDEX: 21.69 KG/M2 | RESPIRATION RATE: 15 BRPM | HEART RATE: 68 BPM | WEIGHT: 135 LBS | DIASTOLIC BLOOD PRESSURE: 66 MMHG | HEIGHT: 66 IN | OXYGEN SATURATION: 98 % | TEMPERATURE: 98 F | SYSTOLIC BLOOD PRESSURE: 148 MMHG

## 2017-02-20 DIAGNOSIS — C79.31 SECONDARY ADENOCARCINOMA OF BRAIN: ICD-10-CM

## 2017-02-20 PROBLEM — R55 SYNCOPE AND COLLAPSE: Status: ACTIVE | Noted: 2017-02-20

## 2017-02-20 PROBLEM — R40.4 TRANSIENT ALTERATION OF AWARENESS: Status: ACTIVE | Noted: 2017-02-20

## 2017-02-20 LAB
ALBUMIN SERPL BCP-MCNC: 2.6 G/DL
ALP SERPL-CCNC: 119 U/L
ALT SERPL W/O P-5'-P-CCNC: 38 U/L
ANION GAP SERPL CALC-SCNC: 7 MMOL/L
APTT BLDCRRT: 21.5 SEC
AST SERPL-CCNC: 24 U/L
BASOPHILS # BLD AUTO: 0.02 K/UL
BASOPHILS NFR BLD: 0.5 %
BILIRUB SERPL-MCNC: 0.8 MG/DL
BUN SERPL-MCNC: 23 MG/DL
CALCIUM SERPL-MCNC: 8.3 MG/DL
CHLORIDE SERPL-SCNC: 116 MMOL/L
CO2 SERPL-SCNC: 19 MMOL/L
CREAT SERPL-MCNC: 1.5 MG/DL
DIFFERENTIAL METHOD: ABNORMAL
EOSINOPHIL # BLD AUTO: 0.2 K/UL
EOSINOPHIL NFR BLD: 4.8 %
ERYTHROCYTE [DISTWIDTH] IN BLOOD BY AUTOMATED COUNT: 13.8 %
EST. GFR  (AFRICAN AMERICAN): 39.8 ML/MIN/1.73 M^2
EST. GFR  (NON AFRICAN AMERICAN): 34.6 ML/MIN/1.73 M^2
GLUCOSE SERPL-MCNC: 95 MG/DL
HCT VFR BLD AUTO: 32.4 %
HGB BLD-MCNC: 10.4 G/DL
INR PPP: 1
LYMPHOCYTES # BLD AUTO: 1.4 K/UL
LYMPHOCYTES NFR BLD: 33 %
MAGNESIUM SERPL-MCNC: 1.5 MG/DL
MCH RBC QN AUTO: 29.6 PG
MCHC RBC AUTO-ENTMCNC: 32.1 %
MCV RBC AUTO: 92 FL
MONOCYTES # BLD AUTO: 0.3 K/UL
MONOCYTES NFR BLD: 6.9 %
NEUTROPHILS # BLD AUTO: 2.4 K/UL
NEUTROPHILS NFR BLD: 54.6 %
PHOSPHATE SERPL-MCNC: 3.2 MG/DL
PLATELET # BLD AUTO: 300 K/UL
PMV BLD AUTO: 10.2 FL
POTASSIUM SERPL-SCNC: 4.3 MMOL/L
PROT SERPL-MCNC: 6 G/DL
PROTHROMBIN TIME: 10.8 SEC
RBC # BLD AUTO: 3.51 M/UL
SODIUM SERPL-SCNC: 142 MMOL/L
WBC # BLD AUTO: 4.37 K/UL

## 2017-02-20 PROCEDURE — 63600175 PHARM REV CODE 636 W HCPCS: Performed by: STUDENT IN AN ORGANIZED HEALTH CARE EDUCATION/TRAINING PROGRAM

## 2017-02-20 PROCEDURE — 83735 ASSAY OF MAGNESIUM: CPT

## 2017-02-20 PROCEDURE — 85610 PROTHROMBIN TIME: CPT

## 2017-02-20 PROCEDURE — G8980 MOBILITY D/C STATUS: HCPCS | Mod: CH

## 2017-02-20 PROCEDURE — 85730 THROMBOPLASTIN TIME PARTIAL: CPT

## 2017-02-20 PROCEDURE — 80053 COMPREHEN METABOLIC PANEL: CPT

## 2017-02-20 PROCEDURE — 25000003 PHARM REV CODE 250: Performed by: INTERNAL MEDICINE

## 2017-02-20 PROCEDURE — G8979 MOBILITY GOAL STATUS: HCPCS | Mod: CH

## 2017-02-20 PROCEDURE — 85025 COMPLETE CBC W/AUTO DIFF WBC: CPT

## 2017-02-20 PROCEDURE — 36415 COLL VENOUS BLD VENIPUNCTURE: CPT

## 2017-02-20 PROCEDURE — G8978 MOBILITY CURRENT STATUS: HCPCS | Mod: CH

## 2017-02-20 PROCEDURE — 99214 OFFICE O/P EST MOD 30 MIN: CPT | Mod: ,,, | Performed by: PSYCHIATRY & NEUROLOGY

## 2017-02-20 PROCEDURE — 84100 ASSAY OF PHOSPHORUS: CPT

## 2017-02-20 PROCEDURE — 99217 PR OBSERVATION CARE DISCHARGE: CPT | Mod: GC,,, | Performed by: INTERNAL MEDICINE

## 2017-02-20 PROCEDURE — 63600175 PHARM REV CODE 636 W HCPCS: Performed by: INTERNAL MEDICINE

## 2017-02-20 PROCEDURE — 97161 PT EVAL LOW COMPLEX 20 MIN: CPT

## 2017-02-20 PROCEDURE — G0378 HOSPITAL OBSERVATION PER HR: HCPCS

## 2017-02-20 RX ORDER — AMOXICILLIN AND CLAVULANATE POTASSIUM 500; 125 MG/1; MG/1
1 TABLET, FILM COATED ORAL 2 TIMES DAILY
Qty: 20 TABLET | Refills: 0 | Status: SHIPPED | OUTPATIENT
Start: 2017-02-20 | End: 2017-03-02

## 2017-02-20 RX ORDER — SULFAMETHOXAZOLE AND TRIMETHOPRIM 800; 160 MG/1; MG/1
1 TABLET ORAL 2 TIMES DAILY
Status: DISCONTINUED | OUTPATIENT
Start: 2017-02-20 | End: 2017-02-20

## 2017-02-20 RX ORDER — LEVETIRACETAM 750 MG/1
750 TABLET ORAL 2 TIMES DAILY
Qty: 60 TABLET | Refills: 3 | Status: CANCELLED | OUTPATIENT
Start: 2017-02-20 | End: 2018-02-20

## 2017-02-20 RX ORDER — MAGNESIUM SULFATE HEPTAHYDRATE 40 MG/ML
2 INJECTION, SOLUTION INTRAVENOUS ONCE
Status: COMPLETED | OUTPATIENT
Start: 2017-02-20 | End: 2017-02-20

## 2017-02-20 RX ORDER — DRONABINOL 5 MG/1
5 CAPSULE ORAL
Start: 2017-02-20 | End: 2017-07-21 | Stop reason: SDUPTHER

## 2017-02-20 RX ADMIN — LORAZEPAM 1 MG: 1 TABLET ORAL at 12:02

## 2017-02-20 RX ADMIN — SODIUM CHLORIDE: 0.9 INJECTION, SOLUTION INTRAVENOUS at 02:02

## 2017-02-20 RX ADMIN — THERA TABS 1 TABLET: TAB at 08:02

## 2017-02-20 RX ADMIN — PANCRELIPASE 1 CAPSULE: 60000; 12000; 38000 CAPSULE, DELAYED RELEASE PELLETS ORAL at 11:02

## 2017-02-20 RX ADMIN — DRONABINOL 2.5 MG: 2.5 CAPSULE ORAL at 08:02

## 2017-02-20 RX ADMIN — MAGNESIUM SULFATE IN WATER 2 G: 40 INJECTION, SOLUTION INTRAVENOUS at 08:02

## 2017-02-20 RX ADMIN — LEVETIRACETAM 750 MG: 750 TABLET, FILM COATED ORAL at 09:02

## 2017-02-20 NOTE — CONSULTS
[facility]  Neurology  Consult Note    Patient Name: Naty St  MRN: [id number]  Admission Date: 2/19/2017  Hospital Length of Stay: 0 days  Code Status: Full Code   Attending Provider: Dr. Cheek  Consulting Provider: Sidney Reyes MD  Primary Care Physician: Olvin Mars MD  Principal Problem:Syncope and collapse    Inpatient consult to Neurology  Consult performed by: SIDNEY REYES.  Consult ordered by: OMAR WHITAKER         Subjective:     Chief Complaint:  syncope    HPI:   71 y.o. female with PMHx of pancreatic cancer (17 years ago, s/p Whipple), brain tumor (s/p resection 1/12/2016 with Dr. Ferrera - initially thought to be meningioma however pathology remarkable for malignant neoplasm with features of metastatic papillary serous adenocarcinoma s/p XRT), breast cancer, colon polyps (tubulovillous/tubular on pathology), stage IV NSCLC (on Tarceva), DVT (on lovenox) who presented to the ED on 2/19 after an unwitnessed syncopal episode earlier on the day of admission. Pt reports she was in her usual state of health the night prior to admission when she reported nausea and vomiting which patient attributed to . On the day of admission, she reported having bilateral lower quadrant pain while she was at [employer]. She went to the bathroom thinking that pain was related to an upcoming BM. Pt remembers having had a bowel movement and reports the next time she does remember is people trying to talk to her but she does not remember much of it and reported having had some confusion afterwards but within a few minutes was back to her baseline cognitively. She denies having experienced nausea, SOB, chest pain, palpitations, diaphoresis or dizziness prior to the event. She denies head or body trauma, bowel/bladder incontinence, tongue/lip biting.      Per chart review: patient admitted to hospital on 1/2016 - based on consult note from that admission, patient had a fall at home  with associated LOC and was reported at the time by daughter to have upper extremity shaking lasting about 4 minutes without bowel/bladder incontinence, drooling, or tongue biting and following that episode, she had period of lethargy prior to returning to baseline; and at the time was on keppra 500mg BID for seizure prophylaxis post surgical resection and dose was increased to 750mg BID which is what the patient is on currently. Pt was recently found to have a UTI (urine cx 2/15 grew pansensitive E.coli) and has not received abx treatment.       Past Medical History   Diagnosis Date    Blood clot in vein 06/2016    Breast cancer 1994    Cataract     History of breast cancer     History of pancreatic cancer     Hx of psychiatric care     Hypertension     Pancreatic cancer 1998    Posterior capsular opacification, left eye     Psychiatric problem     Seizures 01/25/2016    Therapy        Past Surgical History   Procedure Laterality Date    Pancreatic cancer       whipple    Kidney stone surgery  2010--laser    Left eswl  6/20/12    Cysto and right ureteral stent  4/27/12    Oophorectomy  4/25/2012     Laparoscopic BSO, lysis of adhesions, cystoscopy greater than 35mins     Breast lumpectomy  1998     left    Ecoli  2010     removal of blockage, done throat, then through the liver.    Whipple procedure w/ laparoscopy  1998    Breast lumpectomy       left    Cataract extraction Bilateral 2004     yag OU    Colonoscopy N/A 11/13/2015     Procedure: COLONOSCOPY;  Surgeon: Alex Carmona MD;  Location: [facility] ([address] [place] [address]);  Service: Endoscopy;  Laterality: N/A;  2 year f/u    Hysterectomy  1974    Cholecystectomy  1998    Bone biospy  3/9/16    Brain surgery  1/12/16     tumor removal 1/12/16       Review of patient's allergies indicates:   Allergen Reactions    Tobradex [tobramycin-dexamethasone] Swelling    Iodinated contrast media - iv dye Hives    Morphine Other (See Comments)      ""shaking" and tremors    Latex Rash    Phenytoin sodium extended Other (See Comments) and Rash         Current Facility-Administered Medications on File Prior to Encounter   Medication    denosumab (XGEVA) solution 120 mg     Current Outpatient Prescriptions on File Prior to Encounter   Medication Sig    amitriptyline (ELAVIL) 25 MG tablet Take one tablet by mouth once daily    amlodipine (NORVASC) 5 MG tablet Take 1 tablet (5 mg total) by mouth once daily.    atenolol (TENORMIN) 50 MG tablet Take one tablet by mouth once daily    CREON CpDR TAKE ONE CAPSULE BY MOUTH THREE TIMES A DAY WITH MEALS (Patient taking differently: TAKE ONE CAPSULE BY MOUTH THREE TIMES A DAY 30 MINUTES BEFORE MEALS)    dronabinol (MARINOL) 5 MG capsule TAKE ONE CAPSULE BY MOUTH TWO TIMES A DAY BEFORE MEALS    enoxaparin (LOVENOX) 100 mg/mL Syrg Inject 1 mL (100 mg total) into the skin once daily.    erlotinib (TARCEVA) 150 MG tablet Take 1 tablet (150 mg total) by mouth once daily.    escitalopram oxalate (LEXAPRO) 10 MG tablet Take 1 tablet (10 mg total) by mouth once daily.    levetiracetam (KEPPRA) 750 MG Tab Take 1 tablet (750 mg total) by mouth 2 (two) times daily.    mirabegron 50 mg Tb24 Take 1 tablet (50 mg total) by mouth once daily.    multivitamin (THERAGRAN) per tablet Take 1 tablet by mouth once daily.    ciprofloxacin HCl (CILOXAN) 0.3 % ophthalmic solution     clindamycin phosphate 1% (CLINDAGEL) 1 % gel Apply topically 2 (two) times daily.    denosumab (XGEVA) 120 mg/1.7 mL (70 mg/mL) Soln Inject 120 mg into the skin every 28 days.    ERGOCALCIFEROL, VITAMIN D2, (VITAMIN D ORAL) Take by mouth.    hydrocodone-acetaminophen 5-325mg (NORCO) 5-325 mg per tablet Take 1 tablet by mouth every 6 (six) hours as needed for Pain.    lorazepam (ATIVAN) 1 MG tablet TAKE ONE TABLET BY MOUTH TWICE DAILY    ondansetron (ZOFRAN) 8 MG tablet Take 1 tablet (8 mg total) by mouth 4 (four) times daily as needed for Nausea. "     Family History     Problem Relation (Age of Onset)    Breast cancer Mother    Leukemia Paternal Grandfather    Lung cancer Mother    Stomach cancer Paternal Grandmother        Social History Main Topics    Smoking status: Former Smoker     Packs/day: 0.25     Years: 0.50     Quit date: 9/26/1961    Smokeless tobacco: Never Used    Alcohol use No    Drug use: No    Sexual activity: Yes     Partners: Male     Review of Systems   Constitutional: Negative for chills, fatigue and fever.   HENT: Negative for ear discharge and nosebleeds.    Eyes: Negative for photophobia and visual disturbance.   Respiratory: Negative for cough and shortness of breath.    Cardiovascular: Negative for chest pain.   Gastrointestinal: Negative for abdominal pain.   Genitourinary: Negative for difficulty urinating, dysuria and flank pain.   Musculoskeletal: Negative for back pain and neck pain.   Neurological: Positive for syncope. Negative for dizziness, tremors, weakness, light-headedness, numbness and headaches.   Psychiatric/Behavioral: Negative for confusion. The patient is not nervous/anxious.      Objective:     Vital Signs (Most Recent):  Temp: 98.1 °F (36.7 °C) (02/20/17 1141)  Pulse: 65 (02/20/17 1141)  Resp: 17 (02/20/17 1141)  BP: 131/63 (02/20/17 1141)  SpO2: 100 % (02/20/17 1141) Vital Signs (24h Range):  Temp:  [97.8 °F (36.6 °C)-98.4 °F (36.9 °C)] 98.1 °F (36.7 °C)  Pulse:  [61-78] 65  Resp:  [16-18] 17  SpO2:  [95 %-100 %] 100 %  BP: (110-149)/(57-72) 131/63     Weight: 61.2 kg (135 lb)  Body mass index is 21.79 kg/(m^2).    Physical Exam   Constitutional: She is oriented to person, place, and time.   Eyes: EOM are normal. Pupils are equal, round, and reactive to light.   Neurological: She is oriented to person, place, and time. She has a normal Finger-Nose-Finger Test.   Reflex Scores:       Bicep reflexes are 2+ on the right side and 2+ on the left side.       Patellar reflexes are 1+ on the right side and 1+ on  the left side.  Psychiatric: Her speech is normal.       NEUROLOGICAL EXAMINATION:     MENTAL STATUS   Oriented to person, place, and time.   Speech: speech is normal   Level of consciousness: alert    CRANIAL NERVES     CN II   Visual fields full to confrontation.     CN III, IV, VI   Pupils are equal, round, and reactive to light.  Extraocular motions are normal.     CN V   Facial sensation intact.     CN VII   Facial expression full, symmetric.     CN XI   CN XI normal.     CN XII   CN XII normal.     MOTOR EXAM   Overall muscle tone: normal    Strength   Right deltoid: 5/5  Left deltoid: 5/5  Right biceps: 5/5  Left biceps: 5/5  Right triceps: 5/5  Left triceps: 5/5  Right wrist extension: 5/5  Left wrist extension: 5/5  Right interossei: 5/5  Left interossei: 5/5  Right iliopsoas: 5/5  Left iliopsoas: 5/5  Right quadriceps: 5/5  Left quadriceps: 5/5  Right hamstrin/5  Left hamstrin/5  Right anterior tibial: 5/5  Left anterior tibial: 5/5  Right gastroc: 5/5  Left gastroc: 5/5    REFLEXES     Reflexes   Right biceps: 2+  Left biceps: 2+  Right patellar: 1+  Left patellar: 1+  Right plantar: normal  Left plantar: normal    SENSORY EXAM   Light touch normal.   Right arm vibration: normal  Left arm vibration: normal  Right leg vibration: decreased from ankle  Left leg vibration: decreased from ankle    GAIT AND COORDINATION      Coordination   Finger to nose coordination: normal    Tremor   Resting tremor: absent      Significant Labs:   Hemoglobin A1c: No results for input(s): HGBA1C in the last 720 hours.  BMP:   Recent Labs  Lab 17  1239 17  0743   GLU 84 95    142   K 4.0 4.3   * 116*   CO2 19* 19*   BUN 28* 23   CREATININE 1.7* 1.5*   CALCIUM 8.3* 8.3*   MG  --  1.5*     CBC:   Recent Labs  Lab 17  12317  0844   WBC 5.10 4.37   HGB 10.8* 10.4*   HCT 33.3* 32.4*    300     CMP:   Recent Labs  Lab 17  1239 17  0743   GLU 84 95    142   K 4.0  4.3   * 116*   CO2 19* 19*   BUN 28* 23   CREATININE 1.7* 1.5*   CALCIUM 8.3* 8.3*   MG  --  1.5*   PROT 6.0 6.0   ALBUMIN 2.7* 2.6*   BILITOT 1.0 0.8   ALKPHOS 124 119   AST 30 24   ALT 43 38   ANIONGAP 9 7*   EGFRNONAA 29.7* 34.6*     CSF Culture: No results for input(s): CSFCULTURE in the last 48 hours.  CSF Studies: No results for input(s): ALIQUT, APPEARCSF, COLORCSF, CSFWBC, CSFRBC, GLUCCSF, LDHCSF, PROTEINCSF, VDRLCSF in the last 48 hours.  Inflammatory Markers: No results for input(s): SEDRATE, CRP, PROCAL in the last 48 hours.  Urine Culture: No results for input(s): LABURIN in the last 48 hours.  Urine Studies:   Recent Labs  Lab 02/19/17  1704   COLORU Yellow   APPEARANCEUA Clear   PHUR 6.0   SPECGRAV 1.010   PROTEINUA Negative   GLUCUA Negative   KETONESU Negative   BILIRUBINUA Negative   OCCULTUA Negative   NITRITE Positive*   UROBILINOGEN Negative   LEUKOCYTESUR 3+*   RBCUA 1   WBCUA 59*   BACTERIA Moderate*       Significant Imaging:   CT head without contrast (2/19/2017)  No acute intracranial abnormality. LEFT frontal lobe encephalomalacia and gliosis with postoperative changes from LEFT frontal meningioma resection.  Minimal sinus disease.    Assessment and Plan:     * Syncope and collapse  - MRI brain from 12/2016 without evidence of new metastases; NM PET scan (skull to thigh) from 1/31/17 remarkable for lung lesion but otherwise unremarkable.   - CT head from 2/19 unremarkable for acute intracranial pathology.   - orthostatic vitals X2 negative  - lab remarkable for UTI - currently on cipro (caution: cipro can lower seizure threshold; consider alternative antibiotic).   - presentation most consistent with micturition syncope and less likely seizures.   - patient currently at her baseline and is neurologically intact. Continue keppra 750mg BID.      Thank you for your consult.     Sidney Reyes MD  Neurology  [facility]

## 2017-02-20 NOTE — PLAN OF CARE
Problem: Patient Care Overview  Goal: Plan of Care Review  Outcome: Ongoing (interventions implemented as appropriate)  Patient is AAOx4. Pt is calm and cooperative. Teaching and education performed. Patient remains free from falls and injury this shift. Bed in low, locked position, environment is free of clutter, with call bell in reach. Patient encouraged to call for assistance. Patient verbalized understanding. All belongings within reach. Regular diet - good appetite. No N/V throughout shift. Afebrile. Pt went off to get CT of head - results show no intracranial abnormalities. No seizures during shift. Pt has no complaints of pain throughout shift. Will continue to monitor.

## 2017-02-20 NOTE — SUBJECTIVE & OBJECTIVE
"Past Medical History   Diagnosis Date    Blood clot in vein 06/2016    Breast cancer 1994    Cataract     History of breast cancer     History of pancreatic cancer     Hx of psychiatric care     Hypertension     Pancreatic cancer 1998    Posterior capsular opacification, left eye     Psychiatric problem     Seizures 01/25/2016    Therapy        Past Surgical History   Procedure Laterality Date    Pancreatic cancer       whipple    Kidney stone surgery  2010--laser    Left eswl  6/20/12    Cysto and right ureteral stent  4/27/12    Oophorectomy  4/25/2012     Laparoscopic BSO, lysis of adhesions, cystoscopy greater than 35mins     Breast lumpectomy  1998     left    Ecoli  2010     removal of blockage, done throat, then through the liver.    Whipple procedure w/ laparoscopy  1998    Breast lumpectomy       left    Cataract extraction Bilateral 2004     yag OU    Colonoscopy N/A 11/13/2015     Procedure: COLONOSCOPY;  Surgeon: Alex Carmona MD;  Location: [facility] ([address] [place] [address]);  Service: Endoscopy;  Laterality: N/A;  2 year f/u    Hysterectomy  1974    Cholecystectomy  1998    Bone biospy  3/9/16    Brain surgery  1/12/16     tumor removal 1/12/16       Review of patient's allergies indicates:   Allergen Reactions    Tobradex [tobramycin-dexamethasone] Swelling    Iodinated contrast media - iv dye Hives    Morphine Other (See Comments)     "shaking" and tremors    Latex Rash    Phenytoin sodium extended Other (See Comments) and Rash         Current Facility-Administered Medications on File Prior to Encounter   Medication    denosumab (XGEVA) solution 120 mg     Current Outpatient Prescriptions on File Prior to Encounter   Medication Sig    amitriptyline (ELAVIL) 25 MG tablet Take one tablet by mouth once daily    amlodipine (NORVASC) 5 MG tablet Take 1 tablet (5 mg total) by mouth once daily.    atenolol (TENORMIN) 50 MG tablet Take one tablet by mouth once daily    CREON CpDR " TAKE ONE CAPSULE BY MOUTH THREE TIMES A DAY WITH MEALS (Patient taking differently: TAKE ONE CAPSULE BY MOUTH THREE TIMES A DAY 30 MINUTES BEFORE MEALS)    dronabinol (MARINOL) 5 MG capsule TAKE ONE CAPSULE BY MOUTH TWO TIMES A DAY BEFORE MEALS    enoxaparin (LOVENOX) 100 mg/mL Syrg Inject 1 mL (100 mg total) into the skin once daily.    erlotinib (TARCEVA) 150 MG tablet Take 1 tablet (150 mg total) by mouth once daily.    escitalopram oxalate (LEXAPRO) 10 MG tablet Take 1 tablet (10 mg total) by mouth once daily.    levetiracetam (KEPPRA) 750 MG Tab Take 1 tablet (750 mg total) by mouth 2 (two) times daily.    mirabegron 50 mg Tb24 Take 1 tablet (50 mg total) by mouth once daily.    multivitamin (THERAGRAN) per tablet Take 1 tablet by mouth once daily.    ciprofloxacin HCl (CILOXAN) 0.3 % ophthalmic solution     clindamycin phosphate 1% (CLINDAGEL) 1 % gel Apply topically 2 (two) times daily.    denosumab (XGEVA) 120 mg/1.7 mL (70 mg/mL) Soln Inject 120 mg into the skin every 28 days.    ERGOCALCIFEROL, VITAMIN D2, (VITAMIN D ORAL) Take by mouth.    hydrocodone-acetaminophen 5-325mg (NORCO) 5-325 mg per tablet Take 1 tablet by mouth every 6 (six) hours as needed for Pain.    lorazepam (ATIVAN) 1 MG tablet TAKE ONE TABLET BY MOUTH TWICE DAILY    ondansetron (ZOFRAN) 8 MG tablet Take 1 tablet (8 mg total) by mouth 4 (four) times daily as needed for Nausea.     Family History     Problem Relation (Age of Onset)    Breast cancer Mother    Leukemia Paternal Grandfather    Lung cancer Mother    Stomach cancer Paternal Grandmother        Social History Main Topics    Smoking status: Former Smoker     Packs/day: 0.25     Years: 0.50     Quit date: 9/26/1961    Smokeless tobacco: Never Used    Alcohol use No    Drug use: No    Sexual activity: Yes     Partners: Male     Review of Systems   Constitutional: Negative for chills, fatigue and fever.   HENT: Negative for ear discharge and nosebleeds.    Eyes:  Negative for photophobia and visual disturbance.   Respiratory: Negative for cough and shortness of breath.    Cardiovascular: Negative for chest pain.   Gastrointestinal: Negative for abdominal pain.   Genitourinary: Negative for difficulty urinating, dysuria and flank pain.   Musculoskeletal: Negative for back pain and neck pain.   Neurological: Positive for syncope. Negative for dizziness, tremors, weakness, light-headedness, numbness and headaches.   Psychiatric/Behavioral: Negative for confusion. The patient is not nervous/anxious.      Objective:     Vital Signs (Most Recent):  Temp: 98.1 °F (36.7 °C) (02/20/17 1141)  Pulse: 65 (02/20/17 1141)  Resp: 17 (02/20/17 1141)  BP: 131/63 (02/20/17 1141)  SpO2: 100 % (02/20/17 1141) Vital Signs (24h Range):  Temp:  [97.8 °F (36.6 °C)-98.4 °F (36.9 °C)] 98.1 °F (36.7 °C)  Pulse:  [61-78] 65  Resp:  [16-18] 17  SpO2:  [95 %-100 %] 100 %  BP: (110-149)/(57-72) 131/63     Weight: 61.2 kg (135 lb)  Body mass index is 21.79 kg/(m^2).    Physical Exam   Constitutional: She is oriented to person, place, and time.   Eyes: EOM are normal. Pupils are equal, round, and reactive to light.   Neurological: She is oriented to person, place, and time. She has a normal Finger-Nose-Finger Test.   Reflex Scores:       Bicep reflexes are 2+ on the right side and 2+ on the left side.       Patellar reflexes are 1+ on the right side and 1+ on the left side.  Psychiatric: Her speech is normal.       NEUROLOGICAL EXAMINATION:     MENTAL STATUS   Oriented to person, place, and time.   Speech: speech is normal   Level of consciousness: alert    CRANIAL NERVES     CN II   Visual fields full to confrontation.     CN III, IV, VI   Pupils are equal, round, and reactive to light.  Extraocular motions are normal.     CN V   Facial sensation intact.     CN VII   Facial expression full, symmetric.     CN XI   CN XI normal.     CN XII   CN XII normal.     MOTOR EXAM   Overall muscle tone:  normal    Strength   Right deltoid: 5/5  Left deltoid: 5/5  Right biceps: 5/5  Left biceps: 5/5  Right triceps: 5/5  Left triceps: 5/5  Right wrist extension: 5/5  Left wrist extension: 5/5  Right interossei: 5/5  Left interossei: 5/5  Right iliopsoas: 5/5  Left iliopsoas: 5/5  Right quadriceps: 5/5  Left quadriceps: 5/5  Right hamstrin/5  Left hamstrin/5  Right anterior tibial: 5/5  Left anterior tibial: 5/5  Right gastroc: 5/5  Left gastroc: 5/5    REFLEXES     Reflexes   Right biceps: 2+  Left biceps: 2+  Right patellar: 1+  Left patellar: 1+  Right plantar: normal  Left plantar: normal    SENSORY EXAM   Light touch normal.   Right arm vibration: normal  Left arm vibration: normal  Right leg vibration: decreased from ankle  Left leg vibration: decreased from ankle    GAIT AND COORDINATION      Coordination   Finger to nose coordination: normal    Tremor   Resting tremor: absent      Significant Labs:   Hemoglobin A1c: No results for input(s): HGBA1C in the last 720 hours.  BMP:   Recent Labs  Lab 17  1239 17  0743   GLU 84 95    142   K 4.0 4.3   * 116*   CO2 19* 19*   BUN 28* 23   CREATININE 1.7* 1.5*   CALCIUM 8.3* 8.3*   MG  --  1.5*     CBC:   Recent Labs  Lab 17  1239 17  0844   WBC 5.10 4.37   HGB 10.8* 10.4*   HCT 33.3* 32.4*    300     CMP:   Recent Labs  Lab 17  1239 17  0743   GLU 84 95    142   K 4.0 4.3   * 116*   CO2 19* 19*   BUN 28* 23   CREATININE 1.7* 1.5*   CALCIUM 8.3* 8.3*   MG  --  1.5*   PROT 6.0 6.0   ALBUMIN 2.7* 2.6*   BILITOT 1.0 0.8   ALKPHOS 124 119   AST 30 24   ALT 43 38   ANIONGAP 9 7*   EGFRNONAA 29.7* 34.6*     CSF Culture: No results for input(s): CSFCULTURE in the last 48 hours.  CSF Studies: No results for input(s): ALIQUT, APPEARCSF, COLORCSF, CSFWBC, CSFRBC, GLUCCSF, LDHCSF, PROTEINCSF, VDRLCSF in the last 48 hours.  Inflammatory Markers: No results for input(s): SEDRATE, CRP, PROCAL in the last 48  hours.  Urine Culture: No results for input(s): LABURIN in the last 48 hours.  Urine Studies:   Recent Labs  Lab 02/19/17  1704   COLORU Yellow   APPEARANCEUA Clear   PHUR 6.0   SPECGRAV 1.010   PROTEINUA Negative   GLUCUA Negative   KETONESU Negative   BILIRUBINUA Negative   OCCULTUA Negative   NITRITE Positive*   UROBILINOGEN Negative   LEUKOCYTESUR 3+*   RBCUA 1   WBCUA 59*   BACTERIA Moderate*       Significant Imaging:   CT head without contrast (2/19/2017)  No acute intracranial abnormality. LEFT frontal lobe encephalomalacia and gliosis with postoperative changes from LEFT frontal meningioma resection.  Minimal sinus disease.

## 2017-02-20 NOTE — PROGRESS NOTES
"History & Physical  Medical Oncology    SUBJECTIVE:   Chief Complaint/Reason for Admission: syncope  History of Present Illness:  Naty St is a 71 y.o. female with PMH of DVT (on lovenox), pancreatic cancer 17 years ago, breast cancer and meningioma s/p neurosurgical resection on 01/12/2016 (on keppra) and stage IV NSCLC (on Tarceva) presenting to the ED 2/19/17 for an unwitnessed syncopal episode earlier today.     Pt reports she was in her usual state of health until the night of 2/18/17 when she presented with nausea and vomiting x1. This morning while at mass she presented with bilateral lower quadrant pain. She went to the bathroom thinking that pain was related to an upcoming BM. Pt urinated (no BM) and upon standing she felt "weak" and lost consciousness. She denies having experienced nausea, SOB, chest pain, palpitations, diaphoresis or dizziness. According to family pt was confused immediately after event. She denies head or body trauma, bowel/bladder incontinence, tongue/lip biting. Is not taking any new meds.    Of significance, pt had a similar syncopal event on Jan 2016 while on Keppra thought to be due to a seizure, per pt.     Pt was recently found to have a UTI (urine cx 2/15 grew pansensitive  E.coli) and has not received abx treatment. She currently denies dysuria, fever or chills.      Oncological Hx: Taken from Dr. Vazquez's clinic note 2/2/17    "Ms. Naty St was admitted to the hospital between 01/25/2016 and 01/28/2016. She has a history of pancreatic cancer 17 years ago, breast cancer and meningioma, presented to the hospital complaining of loss of consciousness. The patient apparently was in her normal state of health and she stood up to go to the bathroom and fell to the floor. The patient's daughter helped the mother up in the bathroom and noted that her mother's upper extremities were shaking and eye rolling. No reports of bowel or bladder incontinence, tongue biting or " "rolling. The second episode lasted about four minutes and she was extremely lethargic following that. Apparently, the patient had a meningioma resection done in the past; however, recently, underwent neurosurgical resection with Dr. 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      Additional immunohistochemical stains were performed which revealed the tumor cells to be are positive for TTF1 and negative for ER and GCDFP. The morphology and TTF1 positivity are most consistent with lung primary.      Of note, imaging scan at the end of January 2016 revealed a mass in the lung at 2 cm in the medial aspect of the apical segment of the right upper lobe abutting the mediastinum at the level of the azygous vein and abutting and possibly encasing the segmental bronchi and vessels of the apical segment of the right upper lobe and no pleural fluid was present. Also, there is an enlarged right paratracheal lymph node. No evidence of any metastatic disease at the pancreatic site with postoperative changes post Whipple disease    Her PET Scan from 2/15/16 reveal "Hypermetabolic mass in the right lung apex consistent with a primary malignancy. Hypermetabolic mediastinal lymph nodes consistent with metastatic disease. Right sacral hypermetabolic lesion consistent with metastatic disease, noting additional mildly sclerotic lesions in multiple vertebral bodies which do not demonstrate abnormal hypermetabolism    She underwent IR bone biopsy which revealed metastatic adenocarcinoma of lung origin. 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    She has completed focal RT to brain lesions in March 2016.      PET scan from 6/6/16 shows "Dramatic almost complete response to therapy."    Lovenox started after US lower ext 6/7/16 shows Acute complete occlusion of one of the left posterior tibial vein.Remote partial thrombus of the " "proximal left superficial femoral vein.    She is on Tarceva.     12/6/16 PET scan reveals "Stable right upper lobe lesion and right hilar lymph node. No new lesions identified. Trace left pleural effusion".    1/27/17 Renal u/s "Medical renal disease. Nonobstructive right nephrolithiasis"    1/31/17 PET - "Right upper lobe nodule and right hilar lymph node similar and very low grade activity.  There is no definite evidence of recurrence."    Past Medical History   Diagnosis Date    Blood clot in vein 06/2016    Breast cancer 1994    Cataract     History of breast cancer     History of pancreatic cancer     Hx of psychiatric care     Hypertension     Pancreatic cancer 1998    Posterior capsular opacification, left eye     Psychiatric problem     Seizures 01/25/2016    Therapy       Past Surgical History   Procedure Laterality Date    Pancreatic cancer       whipple    Kidney stone surgery  2010--laser    Left eswl  6/20/12    Cysto and right ureteral stent  4/27/12    Oophorectomy  4/25/2012     Laparoscopic BSO, lysis of adhesions, cystoscopy greater than 35mins     Breast lumpectomy  1998     left    Ecoli  2010     removal of blockage, done throat, then through the liver.    Whipple procedure w/ laparoscopy  1998    Breast lumpectomy       left    Cataract extraction Bilateral 2004     yag OU    Colonoscopy N/A 11/13/2015     Procedure: COLONOSCOPY;  Surgeon: Alex Carmona MD;  Location: [facility] ([address] [place] [address]);  Service: Endoscopy;  Laterality: N/A;  2 year f/u    Hysterectomy  1974    Cholecystectomy  1998    Bone biospy  3/9/16    Brain surgery  1/12/16     tumor removal 1/12/16      Current Facility-Administered Medications on File Prior to Encounter   Medication Dose Route Frequency Provider Last Rate Last Dose    denosumab (XGEVA) solution 120 mg  120 mg Subcutaneous 1 time in Clinic/HOD Karrie Vazquez MD         Current Outpatient Prescriptions on File Prior to Encounter "   Medication Sig Dispense Refill    amitriptyline (ELAVIL) 25 MG tablet Take one tablet by mouth once daily 30 tablet 3    amlodipine (NORVASC) 5 MG tablet Take 1 tablet (5 mg total) by mouth once daily. 30 tablet 11    atenolol (TENORMIN) 50 MG tablet Take one tablet by mouth once daily 30 tablet 4    CREON CpDR TAKE ONE CAPSULE BY MOUTH THREE TIMES A DAY WITH MEALS (Patient taking differently: TAKE ONE CAPSULE BY MOUTH THREE TIMES A DAY 30 MINUTES BEFORE MEALS) 270 capsule 4    dronabinol (MARINOL) 5 MG capsule TAKE ONE CAPSULE BY MOUTH TWO TIMES A DAY BEFORE MEALS 60 capsule 2    enoxaparin (LOVENOX) 100 mg/mL Syrg Inject 1 mL (100 mg total) into the skin once daily. 30 Syringe 3    erlotinib (TARCEVA) 150 MG tablet Take 1 tablet (150 mg total) by mouth once daily. 30 tablet 6    escitalopram oxalate (LEXAPRO) 10 MG tablet Take 1 tablet (10 mg total) by mouth once daily. 30 tablet 2    levetiracetam (KEPPRA) 750 MG Tab Take 1 tablet (750 mg total) by mouth 2 (two) times daily. 60 tablet 3    mirabegron 50 mg Tb24 Take 1 tablet (50 mg total) by mouth once daily. 30 tablet 11    multivitamin (THERAGRAN) per tablet Take 1 tablet by mouth once daily.      ciprofloxacin HCl (CILOXAN) 0.3 % ophthalmic solution       clindamycin phosphate 1% (CLINDAGEL) 1 % gel Apply topically 2 (two) times daily. 60 g 6    denosumab (XGEVA) 120 mg/1.7 mL (70 mg/mL) Soln Inject 120 mg into the skin every 28 days.      ERGOCALCIFEROL, VITAMIN D2, (VITAMIN D ORAL) Take by mouth.      hydrocodone-acetaminophen 5-325mg (NORCO) 5-325 mg per tablet Take 1 tablet by mouth every 6 (six) hours as needed for Pain. 20 tablet 0    lorazepam (ATIVAN) 1 MG tablet TAKE ONE TABLET BY MOUTH TWICE DAILY 60 tablet 2    ondansetron (ZOFRAN) 8 MG tablet Take 1 tablet (8 mg total) by mouth 4 (four) times daily as needed for Nausea. 60 tablet 2      Review of patient's allergies indicates:   Allergen Reactions    Tobradex  "[tobramycin-dexamethasone] Swelling    Iodinated contrast media - iv dye Hives    Morphine Other (See Comments)     "shaking" and tremors    Latex Rash    Phenytoin sodium extended Other (See Comments) and Rash       Family History   Problem Relation Age of Onset    Breast cancer Mother     Lung cancer Mother     Leukemia Paternal Grandfather     Stomach cancer Paternal Grandmother     Colon cancer Neg Hx     Ovarian cancer Neg Hx        reports that she quit smoking about 55 years ago. She has a 0.12 pack-year smoking history. She has never used smokeless tobacco. She reports that she does not drink alcohol or use illicit drugs.     Review of Systems:   Constitutional: Denies fevers, weight loss, chills, or weakness.  Eyes: Denies changes in vision.  ENT: Denies dysphagia, nasal discharge, ear pain or discharge.  Cardiovascular: Denies chest pain, palpitations, orthopnea, or claudication.  Respiratory: Denies shortness of breath, cough, hemoptysis, or wheezing.  GI:  POSITIVE for nausea/vomiting, Denies hematochezia, melena, abdominal pain, or changes in appetite.  : Denies dysuria, incontinence, or hematuria.  Musculoskeletal: Denies joint pain or myalgias.  Skin/breast: Denies rashes, lumps, lesions, or discharge.  Neurologic: Denies headache, dizziness, vertigo, or paresthesias.  Psychiatric: Denies changes in mood or hallucinations.  Endocrine: Denies polyuria, polydipsia, heat/cold intolerance.  Hematologic/Lymph: Denies lymphadenopathy, easy bruising or easy bleeding.  Allergic/Immunologic: Denies rash, rhinitis.     OBJECTIVE:     Vital Signs (Most Recent):  Temp: 98.1 °F (36.7 °C) (02/20/17 0450)  Pulse: 68 (02/20/17 0500)  Resp: 18 (02/20/17 0450)  BP: (!) 110/57 (02/20/17 0450)  SpO2: 96 % (02/20/17 0450) Vital Signs (24h Range):  Temp:  [97.8 °F (36.6 °C)-98.3 °F (36.8 °C)] 98.1 °F (36.7 °C)  Pulse:  [54-78] 68  Resp:  [18] 18  SpO2:  [95 %-100 %] 96 %  BP: (110-149)/(57-72) 110/57     I & O " (24h):    Intake/Output Summary (Last 24 hours) at 02/20/17 0744  Last data filed at 02/20/17 0451   Gross per 24 hour   Intake          1878.75 ml   Output             1000 ml   Net           878.75 ml        Physical Exam:  General: well developed, well nourished, no distress  Eyes: conjunctivae/corneas clear. PERRL..  HENT: Head:normocephalic, atraumatic. Ears:hearing grossly normal bilaterally. Nose: no discharge. Throat: lips, mucosa, and tongue normal; teeth and gums normal and no throat erythema.  Neck: supple, symmetrical, trachea midline, no JVD  Lungs:  clear to auscultation bilaterally and normal respiratory effort  Cardiovascular: Heart: regular rate and rhythm, S1, S2 normal, no murmur, click, rub or gallop. Chest Wall: no tenderness. Extremities: no cyanosis or edema, or clubbing. Pulses: 2+ and symmetric.  Abdomen/Rectal: Abdomen: soft, non-tender non-distented; bowel sounds normal; no masses,  no organomegaly. Rectal: not examined  Skin: Skin color, texture, turgor normal.   Musculoskeletal:no clubbing, cyanosis  Neurologic: Normal strength and tone. No focal numbness or weakness. Cranial nerves grossly intact.  Psych/Behavioral:  Normal. and Alert and oriented, appropriate affect.    Laboratory:  CBC/Anemia Labs: Coags:      Recent Labs  Lab 02/19/17  1239   WBC 5.10   HGB 10.8*   HCT 33.3*      MCV 92   RDW 13.6    No results for input(s): INR, APTT in the last 168 hours.    Invalid input(s): PT     Chemistries: ABG:     Recent Labs  Lab 02/13/17  1055 02/19/17  1239     141 141   K 4.1  4.1 4.0   *  116* 113*   CO2 17*  17* 19*   BUN 18  18 28*   CREATININE 1.7*  1.7* 1.7*   CALCIUM 8.3*  8.3* 8.3*   PROT  --  6.0   BILITOT  --  1.0   ALKPHOS  --  124   ALT  --  43   AST  --  30   MG 1.6  1.6  --    PHOS 2.8  2.8  --     No results for input(s): PH, PCO2, PO2, HCO3, POCSATURATED, BE in the last 168 hours.     POCT Glucose: HbA1c:    No results for input(s): POCTGLUCOSE  in the last 168 hours. Hemoglobin A1C   Date Value Ref Range Status   07/08/2013 5.9 4.0 - 6.2 % Final   12/13/2012 5.8 4.0 - 6.2 % Final   04/30/2012 5.6 4.0 - 6.2 % Final        Cardiac Enzymes: Ejection Fractions:    Recent Labs      02/19/17   1832   TROPONINI  <0.006    EF   Date Value Ref Range Status   08/19/2016 55 55 - 65    02/14/2014 65          Diagnostic Results:      ASSESSMENT/PLAN:     Present on Admission:   Syncopal episode  - 2/2 to seizure vs stroke vs orthostatic hypotension vs arrhythmia vs intracerebral mass  - Possible seizure event given hx of prior event 1 yr ago. Event unwitnessed.  - Event possibly secondary to orthostatics in setting of vol depletion 2/2 to emesis and UTI in combination with worsening renal function and antihypertensive meds.   - will get orthostatic vital signs  - Will hold antihypertensive meds  - start on cont IV fluids.   - Arrhythmia is also a likely differential dx. Pt yazan in ED likely 2/2 to atenolol. Will hold med. - Start on cardiac monitor.   - EKG in ED revealed NSR and no STEMI.  -troponin negative and, BNP 78  -  CT No acute intracranial abnormality.LEFT frontal lobe encephalomalacia and gliosis with postoperative changes from LEFT frontal meningioma resection.Minimal sinus disease.  - Continue Keppra  -will consult neurology, to get their opinion on this being 2/2 to seizure.       Hypertension  - BP at goal  - will hold home norvasc and atenolol to avoid hypotension  - will restart meds as needed  - will consider lower dose/renally dosing atenolol given pt's bradycardia and possible contribution to syncopal episode today.     Meningioma   Secondary adenocarcinoma of brain  - s/p surgical resection 1/2016  - continue home Keppra for seizure prophylaxis     Malignant neoplasm of lower lobe of right lung  - f/up Dr. sullivan  - continue Tarceva  150mg PO daily  - on Xgeva q 4wks (last on 2/2/17), daily mag and ca supplements  - follow-up and replete lytes  "PRN     UTI (urinary tract infection)  - Urine cx 2/15- pan sensitive E. Coli  - s/p 1 dose cephalexin in ED  -UA on 2/20/17 shows nitrite positive  - Will continue cipro     CKD  - Cr 1.7 with baseline Cr ~1.2 back in 12/2016  - Recently eval by Nephrology Dr. Bazzi 2/13/17: "may be related to use of Epidermal GF inhibitor,with normal serologies, and benign urine sediment.  At this time would Not perform a renal biopsy unless worsening proteinuria"   - Will start on IV fluids to prevent worsening renal failure in setting of hypovolemia (hx of vomiting and recent UTI)  - avoid NSAIDs and nephrotoxic meds    DVT  - will restart lovenox SQ now that intracranial bleed was ruled out with head CT    Diet: Adult regular  DVT PPx: Will start lovenox   Dispo: pending neuro eval, PT and OT eval    Staff attestation to follow  Stefani Matt PGY-1      I have examined and interviewed the patient and confirmed the resident's findings.  She is feeling well today with no new issues.  Renal functions been stable.  Her episode seems more consistent with syncope but given her history of possible seizures.  Recommended that she be cleared by neurology prior to discharge.  "

## 2017-02-20 NOTE — DISCHARGE SUMMARY
"DISCHARGE SUMMARY  Hospital Medicine    Team: Networked reference to record PCT     Patient Name: Naty tS  YOB: 1945    Admit Date: 2/19/2017    Discharge Date: 02/20/2017    Discharge Attending Physician: Troy East MD     Admitting Resident Stefani Matt MD    Diagnoses:  Active Hospital Problems    Diagnosis  POA    *Syncope and collapse [R55]  Yes    Urinary tract infection without hematuria [N39.0]  Yes    UTI (urinary tract infection) [N39.0]  Yes    STEFAN (acute kidney injury) [N17.9]  Yes    Secondary adenocarcinoma of brain [C79.31]  Yes    Malignant neoplasm of lower lobe of right lung [C34.31]  Yes    Meningioma [D32.9]  Yes     Chronic    History of breast cancer [Z85.3]  Not Applicable    Hypertension [I10]  Yes     Chronic    Allergy to latex [Z91.040]  Yes      Resolved Hospital Problems    Diagnosis Date Resolved POA   No resolved problems to display.       Discharged Condition: admit problems have stabilized       HOSPITAL COURSE:      Initial Presentation:    Naty St is a 71 y.o. female with PMH of DVT (on lovenox), pancreatic cancer 17 years ago, breast cancer and meningioma s/p neurosurgical resection on 01/12/2016 (on keppra) and stage IV NSCLC (on Tarceva) presenting to the ED 2/19/17 for an unwitnessed syncopal episode earlier today.      Pt reports she was in her usual state of health until the night of 2/18/17 when she presented with nausea and vomiting x1. This morning while at mass she presented with bilateral lower quadrant pain. She went to the bathroom thinking that pain was related to an upcoming BM. Pt urinated (no BM) and upon standing she felt "weak" and lost consciousness. She denies having experienced nausea, SOB, chest pain, palpitations, diaphoresis or dizziness. According to family pt was confused immediately after event. She denies head or body trauma, bowel/bladder incontinence, tongue/lip biting. Is not taking any " new meds.     Of significance, pt had a similar syncopal event on Jan 2016 while on Keppra thought to be due to a seizure, per pt.      Pt was recently found to have a UTI (urine cx 2/15 grew pansensitive E.coli) and has not received abx treatment. She currently denies dysuria, fever or chills.    Course of Principle Problem for Admission:     Syncopal episode  - 2/2 to seizure vs stroke vs orthostatic hypotension vs arrhythmia vs intracerebral mass  - Possible seizure event given hx of prior event 1 yr ago. Event unwitnessed.  - Event possibly secondary to orthostatics in setting of vol depletion 2/2 to emesis and UTI in combination with worsening renal function and antihypertensive meds.   - we held antihypertensive meds and started on cont IV fluids.   - Arrhythmia was also a likely differential dx. Pt yazan in ED likely 2/2 to atenolol. EKG in ED revealed NSR and no STEMI and troponin negative and, BNP 78  - CT showed no acute intracranial abnormality.LEFT frontal lobe encephalomalacia and gliosis with postoperative changes from LEFT frontal meningioma resection.Minimal sinus disease.  - Continued Keppra  -Neurology saw patient, and doesn't believe that it is 2/2 to seizures and recommends switching her antibiotic from Cipro (as it decreases the seizure threshold).  We will start Bactrim for 5 days    Other Medical Problems Addressed in the Hospital:        Hypertension  - BP at goal  -we held home norvasc and atenolol to avoid hypotension      Meningioma   Secondary adenocarcinoma of brain  - s/p surgical resection 1/2016  - continued home Keppra for seizure prophylaxis      Malignant neoplasm of lower lobe of right lung  - f/up Dr. sullivan  - continued Tarceva 150mg PO daily  - on Xgeva q 4wks (last on 2/2/17), daily mag and ca supplements  - follow-up and repleted lytes PRN      UTI (urinary tract infection)  - Urine cx 2/15- pan sensitive E. Coli  - s/p 1 dose cephalexin in ED  -UA on 2/20/17 showed  "nitrite positive  - We continued cipro while inpatient and then switched to bactrim on dc      CKD  - Cr 1.7 with baseline Cr ~1.2 back in 12/2016  - Recently eval by Nephrology Dr. Bazzi 2/13/17: "may be related to use of Epidermal GF inhibitor,with normal serologies, and benign urine sediment.  At this time would Not perform a renal biopsy unless worsening proteinuria"  - We startedIV fluids to prevent worsening renal failure in setting of hypovolemia (hx of vomiting and recent UTI)  - avoided NSAIDs and nephrotoxic meds     DVT  - we restarted lovenox SQ once intracranial bleed was ruled out with head CT       CONSULTS: None     Last CBC/BMP/HgbA1c (if applicable):  Recent Results (from the past 336 hour(s))   CBC auto differential    Collection Time: 02/20/17  8:44 AM   Result Value Ref Range    WBC 4.37 3.90 - 12.70 K/uL    Hemoglobin 10.4 (L) 12.0 - 16.0 g/dL    Hematocrit 32.4 (L) 37.0 - 48.5 %    Platelets 300 150 - 350 K/uL   CBC auto differential    Collection Time: 02/19/17 12:39 PM   Result Value Ref Range    WBC 5.10 3.90 - 12.70 K/uL    Hemoglobin 10.8 (L) 12.0 - 16.0 g/dL    Hematocrit 33.3 (L) 37.0 - 48.5 %    Platelets 299 150 - 350 K/uL     No results found for this or any previous visit (from the past 336 hour(s)).  Lab Results   Component Value Date    HGBA1C 5.9 07/08/2013       Other Pertinent Lab Findings:  NA    Pertinent/Significant Diagnostic Studies:     CT head -   No acute intracranial abnormality.    LEFT frontal lobe encephalomalacia and gliosis with postoperative changes from LEFT frontal meningioma resection.    Minimal sinus disease.  ______________________________________     Special Treatments/Procedures: None    Disposition:  Home         Future Scheduled Appointments:  Future Appointments  Date Time Provider Department Center   3/2/2017 1:30 PM LAB, HEM[facility]C CANCER BLDG [facility]   3/2/2017 2:30 PM INJECTION, [facility] INFUSION [facility]   3/8/2017 " 10:00 AM Aline Pina, PT [facility] THER [facility]   3/13/2017 12:00 PM [facility] MRI WIDE BORE [facility] MRI [facility]   3/13/2017 1:30 PM Salty Ferrera MD [facility] NEUROS7 [facility]   3/30/2017 9:30 AM [facility] PET CT LIMIT 400 LBS [facility] PET CT [facility]   3/31/2017 10:20 AM LAB, HEMONC CANCER BLDG [facility] LAB [facility]   3/31/2017 11:30 AM Karrie Vazquez MD [facility] HEM ONC [facility]   3/31/2017 1:00 PM INJECTION, [facility] INFUSION [facility]   4/4/2017 8:30 AM SPECIMEN, [facility] SPECLAB [facility]   4/7/2017 4:30 PM Mihaela Bazzi MD [facility] NEPHRO [facility]   5/1/2017 10:30 AM Ivon Mata, JENIFER [facility] UROGYN [facility]       Follow-up Plans from This Hospitalization:  F/U with appointments    Discharge Medication List:       Naty St   Home Medication Instructions ALLIE:[id number]    Printed on:02/20/17 1634   Medication Information                      amitriptyline (ELAVIL) 25 MG tablet  Take one tablet by mouth once daily             amlodipine (NORVASC) 5 MG tablet  Take 1 tablet (5 mg total) by mouth once daily.             atenolol (TENORMIN) 50 MG tablet  Take one tablet by mouth once daily             ciprofloxacin HCl (CILOXAN) 0.3 % ophthalmic solution               clindamycin phosphate 1% (CLINDAGEL) 1 % gel  Apply topically 2 (two) times daily.             CREON CpDR  TAKE ONE CAPSULE BY MOUTH THREE TIMES A DAY WITH MEALS             denosumab (XGEVA) 120 mg/1.7 mL (70 mg/mL) Soln  Inject 120 mg into the skin every 28 days.             enoxaparin (LOVENOX) 100 mg/mL Syrg  Inject 1 mL (100 mg total) into the skin once daily.             ERGOCALCIFEROL, VITAMIN D2, (VITAMIN D ORAL)  Take by mouth.             erlotinib (TARCEVA) 150 MG tablet  Take 1 tablet (150 mg total) by mouth once daily.             escitalopram oxalate (LEXAPRO) 10 MG tablet  Take 1 tablet (10 mg total) by mouth once daily.             hydrocodone-acetaminophen 5-325mg (NORCO) 5-325 mg per  tablet  Take 1 tablet by mouth every 6 (six) hours as needed for Pain.             levetiracetam (KEPPRA) 750 MG Tab  Take 1 tablet (750 mg total) by mouth 2 (two) times daily.             lorazepam (ATIVAN) 1 MG tablet  TAKE ONE TABLET BY MOUTH TWICE DAILY             mirabegron 50 mg Tb24  Take 1 tablet (50 mg total) by mouth once daily.             multivitamin (THERAGRAN) per tablet  Take 1 tablet by mouth once daily.             ondansetron (ZOFRAN) 8 MG tablet  Take 1 tablet (8 mg total) by mouth 4 (four) times daily as needed for Nausea.                 Patient Instructions:  No discharge procedures on file.    Signing Physician:  Stefani Matt MD     I approve this discharge summary and plan.

## 2017-02-20 NOTE — PLAN OF CARE
Problem: Patient Care Overview  Goal: Plan of Care Review  Outcome: Ongoing (interventions implemented as appropriate)  Pt involved in plan of care and communicating needs throughout shift.   at bedside and involved in care.  Pt up in room independently; no c/o pain or discomfort today.  Ambulated with PT/OT this am.  Tolerating diet, voiding without difficulty.  2g mag sulfate admin for mg=1.5; IVF infusing as ordered throughout shift.  Neurology consulted.  All VSS; no acute events so far this shift.  Pt remaining free from falls or injury throughout shift; bed in lowest position; call light within reach.  Pt instructed to call for assistance as needed.  Q1H rounding done on pt.

## 2017-02-20 NOTE — NURSING
Pt discharged to home at this time per MD order.  Med rec completed by MD and copy of current med list given to pt.  All discharge instructions, medications, prescriptions, and follow-up appointments reviewed with pt and pt's ; copy given and all questions answered to pt's satisfaction.  PIV d/c'd at this time.  Pt ambulating in room with steady gait; tolerating diet, voiding without difficulty; no c/o pain or discomfort at this time.  All VSS; O2 sats WNL on RA.  Pt left floor via wheelchair; accompanied by pt escort and family; no distress noted.

## 2017-02-20 NOTE — ASSESSMENT & PLAN NOTE
- MRI brain from 12/2016 without evidence of new metastases; NM PET scan (skull to thigh) from 1/31/17 remarkable for lung lesion but otherwise unremarkable.   - CT head from 2/19 unremarkable for acute intracranial pathology.   - orthostatic vitals X2 negative  - lab remarkable for UTI - currently on cipro (caution: cipro can lower seizure threshold; consider alternative antibiotic).   - presentation most consistent with micturition syncope and less likely seizures.   - patient currently at her baseline and is neurologically intact. Continue keppra 750mg BID.

## 2017-02-20 NOTE — PLAN OF CARE
Problem: Physical Therapy Goal  Goal: Physical Therapy Goal  Goals met at [facility] for pt to demonstrate safe mobility, able to do stairs and community level gait. Pt and her  also met goal for them to verbalize good understanding of safety awareness and for pt to perform transition movements with rest b/t sup to sit and then sit to stand prior to gait.   Outcome: Outcome(s) achieved Date Met:  02/20/17  PT [facility] today and pt met goals as noted above at [facility]. Pt is safe to d/c home with her  to (A) PRN but pt able to amb community distance and perform stairs with 1 rail and modified (I) for increased time. Pt and her  with good understanding of safety awareness.

## 2017-02-20 NOTE — PT/OT/SLP EVAL
Physical Therapy  Evaluation/Discharge Summary    Naty St   MRN: [id number]   Admitting Diagnosis: <principal problem not specified>    PT Received On: 02/20/17  PT Start Time: 1257     PT Stop Time: 1317    PT Total Time (min): 20 min       Billable Minutes:  Evaluation 20    Diagnosis: syncope  Pt presented to ED after unwitnessed syncopal event at home when getting up from the commode.  Pt found to have UTI on admit and has hx of previous syncopal event due to sz.  Head CT with no acute abnormalities.      Past Medical History   Diagnosis Date    Blood clot in vein 06/2016    Breast cancer 1994    Cataract     History of breast cancer     History of pancreatic cancer     Hx of psychiatric care     Hypertension     Pancreatic cancer 1998    Posterior capsular opacification, left eye     Psychiatric problem     Seizures 01/25/2016    Therapy       Past Surgical History   Procedure Laterality Date    Pancreatic cancer       whipple    Kidney stone surgery  2010--laser    Left eswl  6/20/12    Cysto and right ureteral stent  4/27/12    Oophorectomy  4/25/2012     Laparoscopic BSO, lysis of adhesions, cystoscopy greater than 35mins     Breast lumpectomy  1998     left    Ecoli  2010     removal of blockage, done throat, then through the liver.    Whipple procedure w/ laparoscopy  1998    Breast lumpectomy       left    Cataract extraction Bilateral 2004     yag OU    Colonoscopy N/A 11/13/2015     Procedure: COLONOSCOPY;  Surgeon: Alex Carmona MD;  Location: [facility] ([address] [place] [address]);  Service: Endoscopy;  Laterality: N/A;  2 year f/u    Hysterectomy  1974    Cholecystectomy  1998    Bone biospy  3/9/16    Brain surgery  1/12/16     tumor removal 1/12/16       Referring physician: Stefani Matt MD  Date referred to PT: 2/20/17    General Precautions: Standard, fall  Hx of previous sz  Orthopedic Precautions: N/A   Braces:   none  Do you have any cultural, spiritual, Yazdanism  conflicts, given your current situation?: none    Patient History:  Lives With: spouse  Living Arrangements: house  Home Accessibility: stairs to enter home  Number of Stairs to Enter Home: 8  Stair Railings at Home: outside, present at both sides  Living Environment Comment: Pt live with her maddie who is able to (A) after d/c.  Pt's house is 1 level home with 8 steps to enter and 2 rails.   Pt was (I) with all gait and ADL's prior to admit.  DME: [employer]ESSENIA MORA.  Pt uses BSD over her commode at home.   Equipment Currently Used at Home: 3-in-1 commode  DME owned (not currently used): standard walker    Previous Level of Function:  Ambulation Skills: independent  Transfer Skills: independent  ADL Skills: independent  Work/Leisure Activity: independent    Subjective:  Communicated with Bia montero  prior to session.  Pt reports she is ready to go home  Chief Complaint: to get out of the hospital  Patient goals: to go home    Pain Ratin/10    Pain Rating Post-Intervention: 0/10    Objective:   Patient found with: peripheral IV     Cognitive Exam:  Oriented to: Person, Place and Situation    Follows Commands/attention: Follows multistep  commands  Communication: clear/fluent  Safety awareness/insight to disability: intact    Physical Exam:  Postural examination/scapula alignment: Rounded shoulder    Skin integrity: Visible skin intact  Edema: None noted     Sensation:   Intact    Lower Extremity Range of Motion:  Right Lower Extremity: WFL  Left Lower Extremity: WFL    Lower Extremity Strength:  Right Lower Extremity: WFL  Left Lower Extremity: WFL     Gross motor coordination: WFL    Functional Mobility:  Bed Mobility: not assessed due to pt up and out of bed already.     Transfers:  Sit <> Stand Assistance: Supervision  Sit <> Stand Assistive Device: No Assistive Device    Gait:   Gait Distance: 200 feet with no LOB  Assistance 1: Modified Independent  Gait Assistive Device: No device (pt was able to manage her IV pole  and also amb wtthout  IV pole to assess if pt using pole for any support and she was not. )  Gait Pattern: reciprocal  Gait Deviation(s): decreased leila     High Level balance/gait :  Marching and backwards waking with (S).  Tandem gait with SBA.    Stairs:  Pt ascended/descend 4 stair(s) with No Assistive Device with right with Modified Independent.     Balance:   Static Sit: NORMAL: No deviations seen in posture held statically  Dynamic Sit: NORMAL: No deviations seen in posture held dynamically  Static Stand: NORMAL: No deviations seen in posture held statically  Dynamic stand: GOOD+: Independent gait (with or without assistive device)    Therapeutic Activities and Exercises:  PT eval completed. Pt and her  educated on :  PT POC,  D/c from PT, safety awareness, importance and emphasis on sitting EOB after sup to sit, then standing for a minute prior to gait.   They both verbalized good understanding back to PT.  PT notified nsg of PT's recommendations for d/c from PT and pt's readiness for d/c.  PT also called Dr Richardson ([id number]) to inform him on PT's eval and assessment/recommendations for pt.      AM-PAC 6 CLICK MOBILITY  How much help from another person does this patient currently need?   1 = Unable, Total/Dependent Assistance  2 = A lot, Maximum/Moderate Assistance  3 = A little, Minimum/Contact Guard/Supervision  4 = None, Modified [place]/Independent    Turning over in bed (including adjusting bedclothes, sheets and blankets)?: 4  Sitting down on and standing up from a chair with arms (e.g., wheelchair, bedside commode, etc.): 4  Moving from lying on back to sitting on the side of the bed?: 4  Moving to and from a bed to a chair (including a wheelchair)?: 4  Need to walk in hospital room?: 4  Climbing 3-5 steps with a railing?: 4  Total Score: 24     AM-PAC Raw Score CMS G-Code Modifier Level of Impairment Assistance   6 % Total / Unable   7 - 9 CM 80 - 100% Maximal Assist   10 - 14 CL 60  - 80% Moderate Assist   15 - 19 CK 40 - 60% Moderate Assist   20 - 22 CJ 20 - 40% Minimal Assist   23 CI 1-20% SBA / CGA   24 CH 0% Independent/ Mod I     Patient left seated on sofa next to her  with all lines intact, call button in reach and nsg,. Bia notified.    Assessment:   Naty St is a 71 y.o. female with a medical diagnosis of syncope and presents with no major deficits in (I) with her mobility,  Pt was able to perform community level gait and stairs with modified (I).  She met goals at [facility] for safe mobility and pt and her  with good understanding of safety awareness.  Pt does not need any further skilled PT services at this time.    Rehab identified problem list/impairments: Rehab identified problem list/impairments: impaired endurance    Rehab potential is excellent.    Activity tolerance: Excellent    Discharge recommendations: Discharge Facility/Level Of Care Needs: home     Barriers to discharge: Barriers to Discharge: None    Equipment recommendations: Equipment Needed After Discharge: none     GOALS:   Physical Therapy Goals     Not on file      Multidisciplinary Problems (Resolved)        Problem: Physical Therapy Goal    Goal Priority Disciplines Outcome Goal Variances Interventions   Physical Therapy Goal   (Resolved)     PT/OT, PT Outcome(s) achieved     Description:  Goals met at [facility] for pt to demonstrate safe mobility, able to do stairs and community level gait. Pt and her  also met goal for them to verbalize good understanding of safety awareness and for pt to perform transition movements with rest b/t sup to sit and then sit to stand prior to gait.               PLAN:    Patient to be seen   to address the above listed problems via    Plan of Care expires: 03/22/17  Plan of Care reviewed with: patient, spouse    Functional Assessment Tool Used: AMPAC  Score: 24  Functional Limitation: Mobility: Walking and moving around  Mobility: Walking and Moving  Around Current Status ():   Mobility: Walking and Moving Around Goal Status ():   Mobility: Walking and Moving Around Discharge Status ():      Katie Burgos, PT  02/20/2017

## 2017-02-21 ENCOUNTER — TELEPHONE (OUTPATIENT)
Dept: PHARMACY | Facility: CLINIC | Age: 72
End: 2017-02-21

## 2017-02-21 ENCOUNTER — TELEPHONE (OUTPATIENT)
Dept: NEPHROLOGY | Facility: CLINIC | Age: 72
End: 2017-02-21

## 2017-02-24 ENCOUNTER — TELEPHONE (OUTPATIENT)
Dept: HEMATOLOGY/ONCOLOGY | Facility: CLINIC | Age: 72
End: 2017-02-24

## 2017-02-24 NOTE — TELEPHONE ENCOUNTER
----- Message from Gilmar Ramirez sent at 2/24/2017  8:26 AM CST -----  Contact: pt son (Basil)  Pt son is calling to touch bases regarding pt care.  Contact number [phone]

## 2017-03-02 ENCOUNTER — INFUSION (OUTPATIENT)
Dept: INFUSION THERAPY | Facility: HOSPITAL | Age: 72
End: 2017-03-02
Attending: INTERNAL MEDICINE
Payer: MEDICARE

## 2017-03-02 VITALS
TEMPERATURE: 98 F | RESPIRATION RATE: 18 BRPM | HEART RATE: 66 BPM | DIASTOLIC BLOOD PRESSURE: 60 MMHG | SYSTOLIC BLOOD PRESSURE: 112 MMHG

## 2017-03-02 DIAGNOSIS — C34.90 MALIGNANT NEOPLASM OF LUNG, UNSPECIFIED LATERALITY, UNSPECIFIED PART OF LUNG: Primary | ICD-10-CM

## 2017-03-02 DIAGNOSIS — C79.51 SECONDARY CANCER OF BONE: ICD-10-CM

## 2017-03-02 DIAGNOSIS — C34.31 MALIGNANT NEOPLASM OF LOWER LOBE OF RIGHT LUNG: ICD-10-CM

## 2017-03-02 DIAGNOSIS — C79.31 SECONDARY ADENOCARCINOMA OF BRAIN: ICD-10-CM

## 2017-03-02 LAB
ERYTHROCYTE [DISTWIDTH] IN BLOOD BY AUTOMATED COUNT: 14 %
HCT VFR BLD AUTO: 32.2 %
HGB BLD-MCNC: 10.2 G/DL
MCH RBC QN AUTO: 29.4 PG
MCHC RBC AUTO-ENTMCNC: 31.7 %
MCV RBC AUTO: 93 FL
NEUTROPHILS # BLD AUTO: 2.7 K/UL
PLATELET # BLD AUTO: 288 K/UL
PMV BLD AUTO: 10 FL
RBC # BLD AUTO: 3.47 M/UL
WBC # BLD AUTO: 5.02 K/UL

## 2017-03-02 PROCEDURE — 96401 CHEMO ANTI-NEOPL SQ/IM: CPT

## 2017-03-02 PROCEDURE — 63600175 PHARM REV CODE 636 W HCPCS: Performed by: INTERNAL MEDICINE

## 2017-03-02 PROCEDURE — 85027 COMPLETE CBC AUTOMATED: CPT

## 2017-03-02 RX ADMIN — DENOSUMAB 120 MG: 120 INJECTION SUBCUTANEOUS at 03:03

## 2017-03-07 ENCOUNTER — CLINICAL SUPPORT (OUTPATIENT)
Dept: REHABILITATION | Facility: OTHER | Age: 72
End: 2017-03-07
Attending: OBSTETRICS & GYNECOLOGY
Payer: MEDICARE

## 2017-03-07 DIAGNOSIS — N81.89 PELVIC FLOOR WEAKNESS: Primary | ICD-10-CM

## 2017-03-07 PROCEDURE — G8991 OTHER PT/OT GOAL STATUS: HCPCS | Mod: CI,PO

## 2017-03-07 PROCEDURE — G8990 OTHER PT/OT CURRENT STATUS: HCPCS | Mod: CJ,PO

## 2017-03-07 PROCEDURE — 97110 THERAPEUTIC EXERCISES: CPT | Mod: PO

## 2017-03-07 PROCEDURE — 97161 PT EVAL LOW COMPLEX 20 MIN: CPT | Mod: PO

## 2017-03-07 RX ORDER — PANCRELIPASE 60000; 12000; 38000 [USP'U]/1; [USP'U]/1; [USP'U]/1
CAPSULE, DELAYED RELEASE PELLETS ORAL
Qty: 270 CAPSULE | Refills: 3 | Status: SHIPPED | OUTPATIENT
Start: 2017-03-07 | End: 2018-12-10 | Stop reason: SDUPTHER

## 2017-03-07 NOTE — PROGRESS NOTES
Patient: Naty Lopez [facility] #:  [id number]    Date of treatment: 03/07/2017   Time in: 10:53  Time out: 11:55  # Visits: 1/20  Auth: 20  Referral expiration: 12/31/17  POC expiration: 5/30/17    Outpatient Physical Therapy   Initial Evaluation    Naty is a 71 y.o. female evaluated on 03/07/2017    Physician:  Aide Carvalho,*   Diagnosis:   Encounter Diagnosis   Name Primary?    Pelvic floor weakness Yes        Treatment ordered: PT    Medical History:   Past Medical History:   Diagnosis Date    Blood clot in vein 06/2016    Breast cancer 1994    Cataract     History of breast cancer     History of pancreatic cancer     Hx of psychiatric care     Hypertension     Pancreatic cancer 1998    Posterior capsular opacification, left eye     Psychiatric problem     Seizures 01/25/2016    Therapy         Surgical History:   Past Surgical History:   Procedure Laterality Date    BONE BIOSPY  3/9/16    BRAIN SURGERY  1/12/16    tumor removal 1/12/16    BREAST LUMPECTOMY  1998    left    BREAST LUMPECTOMY      left    CATARACT EXTRACTION Bilateral 2004    yag OU    CHOLECYSTECTOMY  1998    COLONOSCOPY N/A 11/13/2015    Procedure: COLONOSCOPY;  Surgeon: Alex Carmona MD;  Location: [facility] ([address] [place] [address]);  Service: Endoscopy;  Laterality: N/A;  2 year f/u    cysto and right ureteral stent  4/27/12    ecoli  2010    removal of blockage, done throat, then through the liver.    HYSTERECTOMY  1974    KIDNEY STONE SURGERY  2010--laser    left eswl  6/20/12    OOPHORECTOMY  4/25/2012    Laparoscopic BSO, lysis of adhesions, cystoscopy greater than 35mins     pancreatic cancer      whipple    WHIPPLE PROCEDURE W/ LAPAROSCOPY  1998        Medications:   Current Outpatient Prescriptions   Medication Sig    amitriptyline (ELAVIL) 25 MG tablet Take one tablet by mouth once daily    amlodipine (NORVASC) 5 MG tablet Take 1 tablet (5 mg total) by mouth once daily.    atenolol (TENORMIN) 50  "MG tablet Take one tablet by mouth once daily    ciprofloxacin HCl (CILOXAN) 0.3 % ophthalmic solution     clindamycin phosphate 1% (CLINDAGEL) 1 % gel Apply topically 2 (two) times daily.    CREON CpDR TAKE ONE CAPSULE BY MOUTH THREE TIMES A DAY WITH MEALS (Patient taking differently: TAKE ONE CAPSULE BY MOUTH THREE TIMES A DAY 30 MINUTES BEFORE MEALS)    denosumab (XGEVA) 120 mg/1.7 mL (70 mg/mL) Soln Inject 120 mg into the skin every 28 days.    dronabinol (MARINOL) 5 MG capsule Take 1 capsule (5 mg total) by mouth 2 (two) times daily before meals.    enoxaparin (LOVENOX) 100 mg/mL Syrg Inject 1 mL (100 mg total) into the skin once daily.    ERGOCALCIFEROL, VITAMIN D2, (VITAMIN D ORAL) Take by mouth.    erlotinib (TARCEVA) 150 MG tablet Take 1 tablet (150 mg total) by mouth once daily.    escitalopram oxalate (LEXAPRO) 10 MG tablet Take 1 tablet (10 mg total) by mouth once daily.    hydrocodone-acetaminophen 5-325mg (NORCO) 5-325 mg per tablet Take 1 tablet by mouth every 6 (six) hours as needed for Pain.    levetiracetam (KEPPRA) 750 MG Tab Take 1 tablet (750 mg total) by mouth 2 (two) times daily.    lorazepam (ATIVAN) 1 MG tablet TAKE ONE TABLET BY MOUTH TWICE DAILY    mirabegron 50 mg Tb24 Take 1 tablet (50 mg total) by mouth once daily.    multivitamin (THERAGRAN) per tablet Take 1 tablet by mouth once daily.    ondansetron (ZOFRAN) 8 MG tablet Take 1 tablet (8 mg total) by mouth 4 (four) times daily as needed for Nausea.     No current facility-administered medications for this visit.      Facility-Administered Medications Ordered in Other Visits   Medication    denosumab (XGEVA) solution 120 mg       Allergies:   Review of patient's allergies indicates:   Allergen Reactions    Tobradex [tobramycin-dexamethasone] Swelling    Iodinated contrast media - iv dye Hives    Morphine Other (See Comments)     "shaking" and tremors    Latex Rash    Phenytoin sodium extended Other (See Comments) " and Rash      Precautions: DVT  OB/GYN History: childbirth vaginal delivery, episiotomy and menopause; 4 pregnancies, highest birth weight 9.8, did you have trouble healing after? Denies; hysterectomy  Bladder/Bowel History: denies       Barriers to Learning: none  Educational/Spiritual/Cultural needs: none  Environmental Barriers: none noted  Abuse/Neglect: no signs  Nutritional Status: fair  Fall Risk: none    Subjective     What is your primary concern? And when did this first begin?  Pt reports that her current issue has been going on for a long time. She thought she had a prolapsed bladder and finally made an appointment and came for the first time 6 or so months ago. She was told she does not have a prolapsed bladder but rather an overactive bladder and was put on Myrbetriq and it has improved a great deal. Pt states she just got over a UTI for which she was hospitalized for 36 hours or so; she is unsure if this was related to her bladder issues or her cancer or something else.    Pt also reports concerns about her kidneys. She says a stone was found in her kidney in 2012 but did not have any kidney problems that she knew of before that. She reports that her brother and sister have had kidney trouble and her mother had a kidney removed but pt was unaware of any personal kidney issues. Pt states that she read that she has kidney disease on my [facility] but has not had a discussion with her doctor. She has a follow-up bridget. in April and will discuss then.    Pt states that she goes in for a PET scan every few months and it has been negative the last few times. She takes medicine every day for cancer. She states her strength is not like it used to be but it's not bad either. Pt also states she forgets things since having surgery on her brain.    Pain: Patient reports 0/10 with 0 being the lowest and 10 being the highest.     Pain or lack of sensation with vaginal/pelvic exam? Denies; some pain with intercourse due  to age-related dryness  Sexually active? Not currently  Contraception? Hormones? Post menopausal    Previous treatment included: none    Frequency of Urination: Daytime: 3-4        Nighttime: 0    Urine Stream: weak, strong, splayed and interrupted    Bladder Leakage: Yes, not lately (since starting the medication); underwear would be damp when getting up in the morning; 2-3 times/month for the last few years  Frequency of incidents: only at night  Amount Leaked: drops    Do you have difficulty initiating your urine stream? No  Do you feel like you are emptying completely? Yes    Frequency of Bowel Movements: every other day to 3-4 times/week    Do you have difficulty initiating a bowel movement? No  Colon Leakage: No  Stool consistency? Sometimes pellets; tends to be more firm (pt states her medication for her pancreas has made her stool harder)    Form of Protection: pad  Number of Pads required in 24 hours: seldomly uses    Types/amount of Fluid Intake: water (tries to drink 6-7 bottles/day as recommended), Pronourish, juice  Diet: much better than it has been; pt reports seeing a nutritionist a few months ago and it has helped; she is trying hard  Current Exercise: Trying to get better at it; pt reports that she has exercised for half of her life and stopped a few years ago and is trying to get back into it; walks on a track near her house, tries to do 5 days a week    Occupation: Pt doesn't work; used to do a lot of work for her [employer]  Living situation? Lives with  and has a son who is over a lot and helps.     Patient's Goals: For my bladder to be better.    Objective     ORTHO SCREEN  Posture: flexed posture with observable decreased strength and vigor    Pelvic Alignment: no deviations noted in supine    ABDOMINALS  Scarring: half moon scar spanning upper abdomen from pancreatic surgery; good mobility, nonpainful    Diastasis: none   Abdominal strength: Rectus: WFL     Transverse: palpable,  weak        VAGINAL PELVIC FLOOR EXAM  EXTERNAL ASSESSMENT  Skin Condition: WNL  Scarring: episiotomy scar noted; mobile, nonpainful   Sensation: WNL  Pain: none  Introitus: stenotic   Voluntary Contraction: visible lift  Voluntary Relaxation: drop  Involuntary Contraction: bulge  Bearing down: nil    INTERNAL ASSESSMENT  Pain: none  Sensation: able to localize pressure appropriately, able to distinguish pressure from side to side, and able to feel the difference between lift and drop    Vaginal Vault: roomy  Muscle Bulk: atrophy  Muscle Power: 3/5; noted to be weaker on the L side (levators and OI)  Muscle Endurance: 5 sec at best  # Reps To Fatigue: NT    Fast Contractions: pt performed 10 quick flicks on command with incomplete drop noted between contractions; pt verbalizes awareness that she did not drop fully     Quality of Contraction: slow rise, decreased hold and slow relaxation  Specificity: WNL   Coordination: WNL; pt displays good PFM and TA co-contraction  Prolapse Check: vaginal wall weakness noted    SEMG EVALUATION: Deferred due to time constraints    LEVY - 6  Score: 4  % impaired: 17%    TREATMENT    Pt received therapeutic exercise for 15 minutes including:  - 5 x 5'' kegals in supine  - Pt education: see below    Education: Instructed on general anatomy/physiology of urinary/bowel system and PF function using pelvic model; discussed plan of care with patient; instructed in purpose of physical therapy and the  benefits/risks of treatment; instructed in risks of refusing treatment. Patient agreed to treatment plan. Pt was instructed in HEP including 5'' kegals in supine; she verbalized understanding and was provided with a handout (see patient instructions). Naty demonstrated and verbalized understanding of all education provided.    Assessment     Mrs. St is a 71 y.o. female referred to outpatient physical therapy with a medical diagnosis of overactive bladder and urinary urgency.  Pt  presents today with lengthy and evolving medical history including cancer and kidney failure and with difficulty recalling details of her PMh; pt seems unaware of kidney involvement and was encouraged to clarify with her managing doctor at her next appointment in April. Mrs. St appears with decreased strength today though in good spirits and seems to be doing well despite multiple medical diagnoses. Mrs. St presents with signs and symptoms consistent with referring diagnosis including decreased PFM strength and endurance. Pt will benefit from physcial therapy services to improve PFM function in order to minimize symptoms of overactive bladder to maximize quality of life. The following goals were discussed with the patient and patient is in agreement with them as to be addressed in the treatment plan. Pt was given a HEP as described in Patient Instruction. Pt verbally understood the instructions as they were given and demonstrated proper form and technique during therapy. Pt was advise to perform these exercises free of pain and to stop performing them if pain occurs. Will assess PFM via SEMG at pt's next visit and will provide manual care, including soft tissue mobilization of abdomen, as appropriate.    Prognosis: good    History  Co-morbidities and personal factors that may impact the plan of care Examination  Body Structures and Functions, activity limitations and participation restrictions that may impact the plan of care Clinical Presentation   Decision Making/ Complexity Score   Co-morbidities:   Cancer (breast, pancreatic, lung with mets to brain and bone)  Kidney disease  Frequent UTI's  DVT  Hx of brain tumor  Personal Factors:   none Body Regions: Pelvis, abdomen    Body Systems: Musculoskeletal, neuromuscular    Activity limitations: denies    Participation Restrictions: pt no longer works at her [employer] recent sickness less so than overactive bladder   Evolving but stable currently   Low          GOALS  Short Term Goals: 4 weeks (4/4/17)  1. Pt will demonstrated increase in PF muscle endurance to 5 sec x 10 reps per SEMG in order to improve PFM function to decrease urinary frequency.  2. Pt will demonstrate improvement in core strength as assessed by PT in order to improve functional strength and stability.   3. Pt will demonstrate ability to perform 10 quick flicks with complete derecruitment in order to improve management of urinary urgency/frequency.  2. Pt will tolerate HEP to improve impairments and independence with ADL's.    Long Term Goals: 12 weeks (5/30/17)  1. Pt will improve PF muscle endurance to 10 sec x 10 reps per SEMG in order to improve PFM function to maximize functional independence.  2. Pt will report improvement in urinary urgency/frequency and leakage in order to demonstrate improvement in overall quality of life and independent function.   3. Pt will demonstrate ability to bulge on command in order to demonstrate improvement in PFM function and management of bladder/bowel habits.  4. Pt will be independent with HEP and self management.        Plan     Pt will be treated by physical therapy 1 time a week for 3 months for Pt Education, HEP, therapeutic exercises, neuromuscular re-education, therapeutic activity, gait training, manual therapy, and modalities (including SEMG) PRN to achieve established goals.

## 2017-03-07 NOTE — PLAN OF CARE
Patient: Naty Lopez [facility] #:  [id number]    Date of treatment: 03/07/2017   Time in: 10:53  Time out: 11:55  # Visits: 1/20  Auth: 20  Referral expiration: 12/31/17  POC expiration: 5/30/17    Outpatient Physical Therapy   Initial Evaluation    Naty is a 71 y.o. female evaluated on 03/07/2017    Physician:  Aide Carvalho,*   Diagnosis:   Encounter Diagnosis   Name Primary?    Pelvic floor weakness Yes        Treatment ordered: PT    Medical History:   Past Medical History:   Diagnosis Date    Blood clot in vein 06/2016    Breast cancer 1994    Cataract     History of breast cancer     History of pancreatic cancer     Hx of psychiatric care     Hypertension     Pancreatic cancer 1998    Posterior capsular opacification, left eye     Psychiatric problem     Seizures 01/25/2016    Therapy         Surgical History:   Past Surgical History:   Procedure Laterality Date    BONE BIOSPY  3/9/16    BRAIN SURGERY  1/12/16    tumor removal 1/12/16    BREAST LUMPECTOMY  1998    left    BREAST LUMPECTOMY      left    CATARACT EXTRACTION Bilateral 2004    yag OU    CHOLECYSTECTOMY  1998    COLONOSCOPY N/A 11/13/2015    Procedure: COLONOSCOPY;  Surgeon: Alex Carmona MD;  Location: [facility] ([address] [place] [address]);  Service: Endoscopy;  Laterality: N/A;  2 year f/u    cysto and right ureteral stent  4/27/12    ecoli  2010    removal of blockage, done throat, then through the liver.    HYSTERECTOMY  1974    KIDNEY STONE SURGERY  2010--laser    left eswl  6/20/12    OOPHORECTOMY  4/25/2012    Laparoscopic BSO, lysis of adhesions, cystoscopy greater than 35mins     pancreatic cancer      whipple    WHIPPLE PROCEDURE W/ LAPAROSCOPY  1998        Medications:   Current Outpatient Prescriptions   Medication Sig    amitriptyline (ELAVIL) 25 MG tablet Take one tablet by mouth once daily    amlodipine (NORVASC) 5 MG tablet Take 1 tablet (5 mg total) by mouth once daily.    atenolol (TENORMIN) 50  "MG tablet Take one tablet by mouth once daily    ciprofloxacin HCl (CILOXAN) 0.3 % ophthalmic solution     clindamycin phosphate 1% (CLINDAGEL) 1 % gel Apply topically 2 (two) times daily.    CREON CpDR TAKE ONE CAPSULE BY MOUTH THREE TIMES A DAY WITH MEALS (Patient taking differently: TAKE ONE CAPSULE BY MOUTH THREE TIMES A DAY 30 MINUTES BEFORE MEALS)    denosumab (XGEVA) 120 mg/1.7 mL (70 mg/mL) Soln Inject 120 mg into the skin every 28 days.    dronabinol (MARINOL) 5 MG capsule Take 1 capsule (5 mg total) by mouth 2 (two) times daily before meals.    enoxaparin (LOVENOX) 100 mg/mL Syrg Inject 1 mL (100 mg total) into the skin once daily.    ERGOCALCIFEROL, VITAMIN D2, (VITAMIN D ORAL) Take by mouth.    erlotinib (TARCEVA) 150 MG tablet Take 1 tablet (150 mg total) by mouth once daily.    escitalopram oxalate (LEXAPRO) 10 MG tablet Take 1 tablet (10 mg total) by mouth once daily.    hydrocodone-acetaminophen 5-325mg (NORCO) 5-325 mg per tablet Take 1 tablet by mouth every 6 (six) hours as needed for Pain.    levetiracetam (KEPPRA) 750 MG Tab Take 1 tablet (750 mg total) by mouth 2 (two) times daily.    lorazepam (ATIVAN) 1 MG tablet TAKE ONE TABLET BY MOUTH TWICE DAILY    mirabegron 50 mg Tb24 Take 1 tablet (50 mg total) by mouth once daily.    multivitamin (THERAGRAN) per tablet Take 1 tablet by mouth once daily.    ondansetron (ZOFRAN) 8 MG tablet Take 1 tablet (8 mg total) by mouth 4 (four) times daily as needed for Nausea.     No current facility-administered medications for this visit.      Facility-Administered Medications Ordered in Other Visits   Medication    denosumab (XGEVA) solution 120 mg       Allergies:   Review of patient's allergies indicates:   Allergen Reactions    Tobradex [tobramycin-dexamethasone] Swelling    Iodinated contrast media - iv dye Hives    Morphine Other (See Comments)     "shaking" and tremors    Latex Rash    Phenytoin sodium extended Other (See Comments) " and Rash      Precautions: DVT  OB/GYN History: childbirth vaginal delivery, episiotomy and menopause; 4 pregnancies, highest birth weight 9.8, did you have trouble healing after? Denies; hysterectomy  Bladder/Bowel History: denies       Barriers to Learning: none  Educational/Spiritual/Cultural needs: none  Environmental Barriers: none noted  Abuse/Neglect: no signs  Nutritional Status: fair  Fall Risk: none    Subjective     What is your primary concern? And when did this first begin?  Pt reports that her current issue has been going on for a long time. She thought she had a prolapsed bladder and finally made an appointment and came for the first time 6 or so months ago. She was told she does not have a prolapsed bladder but rather an overactive bladder and was put on Myrbetriq and it has improved a great deal. Pt states she just got over a UTI for which she was hospitalized for 36 hours or so; she is unsure if this was related to her bladder issues or her cancer or something else.    Pt also reports concerns about her kidneys. She says a stone was found in her kidney in 2012 but did not have any kidney problems that she knew of before that. She reports that her brother and sister have had kidney trouble and her mother had a kidney removed but pt was unaware of any personal kidney issues. Pt states that she read that she has kidney disease on my [facility] but has not had a discussion with her doctor. She has a follow-up bridget. in April and will discuss then.    Pt states that she goes in for a PET scan every few months and it has been negative the last few times. She takes medicine every day for cancer. She states her strength is not like it used to be but it's not bad either. Pt also states she forgets things since having surgery on her brain.    Pain: Patient reports 0/10 with 0 being the lowest and 10 being the highest.     Pain or lack of sensation with vaginal/pelvic exam? Denies; some pain with intercourse due  to age-related dryness  Sexually active? Not currently  Contraception? Hormones? Post menopausal    Previous treatment included: none    Frequency of Urination: Daytime: 3-4        Nighttime: 0    Urine Stream: weak, strong, splayed and interrupted    Bladder Leakage: Yes, not lately (since starting the medication); underwear would be damp when getting up in the morning; 2-3 times/month for the last few years  Frequency of incidents: only at night  Amount Leaked: drops    Do you have difficulty initiating your urine stream? No  Do you feel like you are emptying completely? Yes    Frequency of Bowel Movements: every other day to 3-4 times/week    Do you have difficulty initiating a bowel movement? No  Colon Leakage: No  Stool consistency? Sometimes pellets; tends to be more firm (pt states her medication for her pancreas has made her stool harder)    Form of Protection: pad  Number of Pads required in 24 hours: seldomly uses    Types/amount of Fluid Intake: water (tries to drink 6-7 bottles/day as recommended), Pronourish, juice  Diet: much better than it has been; pt reports seeing a nutritionist a few months ago and it has helped; she is trying hard  Current Exercise: Trying to get better at it; pt reports that she has exercised for half of her life and stopped a few years ago and is trying to get back into it; walks on a track near her house, tries to do 5 days a week    Occupation: Pt doesn't work; used to do a lot of work for her [employer].  Living situation? Lives with  and has a son who is over a lot and helps.     Patient's Goals: For my bladder to be better.    Objective     ORTHO SCREEN  Posture: flexed posture with observable decreased strength and vigor    Pelvic Alignment: no deviations noted in supine    ABDOMINALS  Scarring: half moon scar spanning upper abdomen from pancreatic surgery; good mobility, nonpainful    Diastasis: none   Abdominal strength: Rectus: WFL     Transverse: palpable,  weak        VAGINAL PELVIC FLOOR EXAM  EXTERNAL ASSESSMENT  Skin Condition: WNL  Scarring: episiotomy scar noted; mobile, nonpainful   Sensation: WNL  Pain: none  Introitus: stenotic   Voluntary Contraction: visible lift  Voluntary Relaxation: drop  Involuntary Contraction: bulge  Bearing down: nil    INTERNAL ASSESSMENT  Pain: none  Sensation: able to localize pressure appropriately, able to distinguish pressure from side to side, and able to feel the difference between lift and drop    Vaginal Vault: roomy  Muscle Bulk: atrophy  Muscle Power: 3/5; noted to be weaker on the L side (levators and OI)  Muscle Endurance: 5 sec at best  # Reps To Fatigue: NT    Fast Contractions: pt performed 10 quick flicks on command with incomplete drop noted between contractions; pt verbalizes awareness that she did not drop fully     Quality of Contraction: slow rise, decreased hold and slow relaxation  Specificity: WNL   Coordination: WNL; pt displays good PFM and TA co-contraction  Prolapse Check: vaginal wall weakness noted    SEMG EVALUATION: Deferred due to time constraints    LEVY - 6  Score: 4  % impaired: 17%    TREATMENT    Pt received therapeutic exercise for 15 minutes including:  - 5 x 5'' kegals in supine  - Pt education: see below    Education: Instructed on general anatomy/physiology of urinary/bowel system and PF function using pelvic model; discussed plan of care with patient; instructed in purpose of physical therapy and the  benefits/risks of treatment; instructed in risks of refusing treatment. Patient agreed to treatment plan. Pt was instructed in HEP including 5'' kegals in supine; she verbalized understanding and was provided with a handout (see patient instructions). Naty demonstrated and verbalized understanding of all education provided.    Assessment     Mrs. St is a 71 y.o. female referred to outpatient physical therapy with a medical diagnosis of overactive bladder and urinary urgency.  Pt  presents today with lengthy and evolving medical history including cancer and kidney failure and with difficulty recalling details of her PMh; pt seems unaware of kidney involvement and was encouraged to clarify with her managing doctor at her next appointment in April. Mrs. St appears with decreased strength today though in good spirits and seems to be doing well despite multiple medical diagnoses. Mrs. St presents with signs and symptoms consistent with referring diagnosis including decreased PFM strength and endurance. Pt will benefit from physcial therapy services to improve PFM function in order to minimize symptoms of overactive bladder to maximize quality of life. The following goals were discussed with the patient and patient is in agreement with them as to be addressed in the treatment plan. Pt was given a HEP as described in Patient Instruction. Pt verbally understood the instructions as they were given and demonstrated proper form and technique during therapy. Pt was advise to perform these exercises free of pain and to stop performing them if pain occurs. Will assess PFM via SEMG at pt's next visit and will provide manual care, including soft tissue mobilization of abdomen, as appropriate.    Prognosis: good    History  Co-morbidities and personal factors that may impact the plan of care Examination  Body Structures and Functions, activity limitations and participation restrictions that may impact the plan of care Clinical Presentation   Decision Making/ Complexity Score   Co-morbidities:   Cancer (breast, pancreatic, lung with mets to brain and bone)  Kidney disease  Frequent UTI's  DVT  Hx of brain tumor  Personal Factors:   none Body Regions: Pelvis, abdomen    Body Systems: Musculoskeletal, neuromuscular    Activity limitations: denies    Participation Restrictions: pt no longer works at her [employer] recent sickness less so than overactive bladder   Evolving but stable currently   Low          GOALS  Short Term Goals: 4 weeks (4/4/17)  1. Pt will demonstrated increase in PF muscle endurance to 5 sec x 10 reps per SEMG in order to improve PFM function to decrease urinary frequency.  2. Pt will demonstrate improvement in core strength as assessed by PT in order to improve functional strength and stability.   3. Pt will demonstrate ability to perform 10 quick flicks with complete derecruitment in order to improve management of urinary urgency/frequency.  2. Pt will tolerate HEP to improve impairments and independence with ADL's.    Long Term Goals: 12 weeks (5/30/17)  1. Pt will improve PF muscle endurance to 10 sec x 10 reps per SEMG in order to improve PFM function to maximize functional independence.  2. Pt will report improvement in urinary urgency/frequency and leakage in order to demonstrate improvement in overall quality of life and independent function.   3. Pt will demonstrate ability to bulge on command in order to demonstrate improvement in PFM function and management of bladder/bowel habits.  4. Pt will be independent with HEP and self management.        Plan     Pt will be treated by physical therapy 1 time a week for 3 months for Pt Education, HEP, therapeutic exercises, neuromuscular re-education, therapeutic activity, gait training, manual therapy, and modalities (including SEMG) PRN to achieve established goals.

## 2017-03-07 NOTE — PATIENT INSTRUCTIONS
Home Exercises 3/7/17    Colton in Supine    1. Lie comfortably and take a few deep breaths to relax.   2. Squeeze and lift your pelvic floor muscles as if trying not to pee and hold for 5 seconds. Make sure your buttocks and thighs stay relaxed.  3. Relax and drop your pelvic floor muscles for 10 seconds.   4. Repeat 10 times, twice per day

## 2017-03-10 DIAGNOSIS — R63.0 ANOREXIA: ICD-10-CM

## 2017-03-13 ENCOUNTER — OFFICE VISIT (OUTPATIENT)
Dept: NEUROSURGERY | Facility: CLINIC | Age: 72
End: 2017-03-13
Payer: MEDICARE

## 2017-03-13 ENCOUNTER — HOSPITAL ENCOUNTER (OUTPATIENT)
Dept: RADIOLOGY | Facility: HOSPITAL | Age: 72
Discharge: HOME | End: 2017-03-13
Attending: NEUROLOGICAL SURGERY
Payer: MEDICARE

## 2017-03-13 VITALS
BODY MASS INDEX: 21.91 KG/M2 | HEART RATE: 64 BPM | HEIGHT: 66 IN | DIASTOLIC BLOOD PRESSURE: 68 MMHG | WEIGHT: 136.31 LBS | SYSTOLIC BLOOD PRESSURE: 129 MMHG

## 2017-03-13 DIAGNOSIS — C79.31 METASTATIC CANCER TO BRAIN: Primary | ICD-10-CM

## 2017-03-13 DIAGNOSIS — C79.31 METASTATIC CANCER TO BRAIN: ICD-10-CM

## 2017-03-13 LAB
CREAT SERPL-MCNC: 1.9 MG/DL (ref 0.5–1.4)
SAMPLE: ABNORMAL

## 2017-03-13 PROCEDURE — 1159F MED LIST DOCD IN RCRD: CPT | Mod: S$GLB,,, | Performed by: NEUROLOGICAL SURGERY

## 2017-03-13 PROCEDURE — 1157F ADVNC CARE PLAN IN RCRD: CPT | Mod: S$GLB,,, | Performed by: NEUROLOGICAL SURGERY

## 2017-03-13 PROCEDURE — 3078F DIAST BP <80 MM HG: CPT | Mod: S$GLB,,, | Performed by: NEUROLOGICAL SURGERY

## 2017-03-13 PROCEDURE — 1160F RVW MEDS BY RX/DR IN RCRD: CPT | Mod: S$GLB,,, | Performed by: NEUROLOGICAL SURGERY

## 2017-03-13 PROCEDURE — 70551 MRI BRAIN STEM W/O DYE: CPT | Mod: 26,,, | Performed by: RADIOLOGY

## 2017-03-13 PROCEDURE — 1126F AMNT PAIN NOTED NONE PRSNT: CPT | Mod: S$GLB,,, | Performed by: NEUROLOGICAL SURGERY

## 2017-03-13 PROCEDURE — 3074F SYST BP LT 130 MM HG: CPT | Mod: S$GLB,,, | Performed by: NEUROLOGICAL SURGERY

## 2017-03-13 PROCEDURE — 99213 OFFICE O/P EST LOW 20 MIN: CPT | Mod: S$GLB,,, | Performed by: NEUROLOGICAL SURGERY

## 2017-03-13 PROCEDURE — 99999 PR PBB SHADOW E&M-EST. PATIENT-LVL III: CPT | Mod: PBBFAC,,, | Performed by: NEUROLOGICAL SURGERY

## 2017-03-13 RX ORDER — DRONABINOL 5 MG/1
CAPSULE ORAL
Qty: 60 CAPSULE | Refills: 2 | Status: SHIPPED | OUTPATIENT
Start: 2017-03-13 | End: 2017-04-07 | Stop reason: SDUPTHER

## 2017-03-13 NOTE — PROGRESS NOTES
This office note has been dictated.  Naty St returned in neurosurgical followup to the office today.  She has   been doing better over the last three months.  She has felt less depressed, has   been eating better and been up and around more.  She did have a syncopal episode   last month, which led to her coming to the ER where a CT scan of the head   showed no acute changes.  A recent PET scan showed no new findings.    On brief examination, she is alert and cooperative.  She is speaking clearly.    She has had some regrowth of her scalp hair, but this still has her somewhat   concerned.  She has good extraocular movements, shows normal movement and seems   otherwise neurologically intact.    MRI of the brain was repeated at [facility] today and compared to her   earlier studies.  Her creatinine was mildly elevated at 1.9 and contrast was not   given.  Creatinine has been in this range over the last 3-4 months.  Comparing   the scan to her scan of 12/19/16, ventricular size is unchanged.  There is no   compression on the ventricle.  On FLAIR scan, the degree of signal change looks   the same.  Comparing noncontrast T1 studies, these also appear to be about the   same.  There is no evidence for any new lesions based on FLAIR.    I am happy to see her doing well.  She remains on Tarceva and will follow up   with Dr. Vazquez.  I would like to see her back in about three months with a   followup MRI and if this is stable, we can probably wait six months before   getting another study.      TONY/SYLVAIN  dd: 03/13/2017 14:09:45 (CDT)  td: 03/14/2017 08:44:12 (CDT)  Doc ID   #[id number]  Job ID #[id number]    CC: Naty St

## 2017-03-13 NOTE — LETTER
March 13, 2017      Olvin Mars MD  [address]  [facility] [address]           [facility] Fl  [address]  [facility] [address]  Phone: [phone]          Patient: Naty St   MR Number: [id number]   YOB: 1945   Date of Visit: 3/13/2017       Dear Dr. Olvin Mars:    Thank you for referring Naty St to me for evaluation. Attached you will find relevant portions of my assessment and plan of care.    If you have questions, please do not hesitate to call me. I look forward to following Naty St along with you.    Sincerely,    Salty Ferrera MD    Enclosure  CC:  No Recipients    If you would like to receive this communication electronically, please contact [email or web address] or [phone] to request more information on [employer] Link access.    For providers and/or their staff who would like to refer a patient to [facility], please contact us through our one-stop-shop provider referral line, [facility], at [phone].    If you feel you have received this communication in error or would no longer like to receive these types of communications, please e-mail [email or web address]

## 2017-03-13 NOTE — MR AVS SNAPSHOT
[facility] - Neurosurgery 7th Fl  [address]  [facility] [address]  Phone: [phone]                  Naty St   3/13/2017 1:30 PM   Office Visit    Description:  Female : 1945   Provider:  Salty Ferrera MD   [facility]           Diagnoses this Visit        Comments    Metastatic cancer to brain    -  Primary            To Do List           Future Appointments        Provider Department Dept Phone    3/14/2017 10:00 AM Lauren Shepley, PT [facility] [phone]    3/21/2017 11:00 AM Lauren Shepley, PT [facility] [phone]    3/28/2017 11:00 AM Lauren Shepley, PT [facility] [phone]    3/30/2017 9:30 AM [facility] PET CT LIMIT 400 LBS [facility] [phone]    3/31/2017 2:00 PM LAB, HEMONC CANCER BLDG [facility] [phone]      Goals (5 Years of Data)     None      Follow-Up and Disposition     Return in about 3 months (around 2017) for MRI brain.      [facility]     [facility] Nurse Care Line - 24/7 Assistance  Registered nurses in the [facility] provide clinical advisement, health education, appointment booking, and other advisory services.  Call for this free service at [phone].             Medications           Message regarding Medications     Verify the changes and/or additions to your medication regime listed below are the same as discussed with your clinician today.  If any of these changes or additions are incorrect, please notify your healthcare provider.             Verify that the below list of medications is an accurate representation of the medications you are currently taking.  If none reported, the list may be blank. If incorrect, please contact your healthcare provider. Carry this list with you in case of emergency.           Current Medications     amitriptyline (ELAVIL) 25 MG tablet Take one tablet  "by mouth once daily    amlodipine (NORVASC) 5 MG tablet Take 1 tablet (5 mg total) by mouth once daily.    atenolol (TENORMIN) 50 MG tablet Take one tablet by mouth once daily    ciprofloxacin HCl (CILOXAN) 0.3 % ophthalmic solution     clindamycin phosphate 1% (CLINDAGEL) 1 % gel Apply topically 2 (two) times daily.    CREON CpDR TAKE ONE CAPSULE BY MOUTH THREE TIMES A DAY WITH MEALS    denosumab (XGEVA) 120 mg/1.7 mL (70 mg/mL) Soln Inject 120 mg into the skin every 28 days.    dronabinol (MARINOL) 5 MG capsule Take 1 capsule (5 mg total) by mouth 2 (two) times daily before meals.    dronabinol (MARINOL) 5 MG capsule TAKE ONE CAPSULE BY MOUTH TWO TIMES A DAY BEFORE MEALS    enoxaparin (LOVENOX) 100 mg/mL Syrg Inject 1 mL (100 mg total) into the skin once daily.    ERGOCALCIFEROL, VITAMIN D2, (VITAMIN D ORAL) Take by mouth.    erlotinib (TARCEVA) 150 MG tablet Take 1 tablet (150 mg total) by mouth once daily.    escitalopram oxalate (LEXAPRO) 10 MG tablet Take 1 tablet (10 mg total) by mouth once daily.    hydrocodone-acetaminophen 5-325mg (NORCO) 5-325 mg per tablet Take 1 tablet by mouth every 6 (six) hours as needed for Pain.    levetiracetam (KEPPRA) 750 MG Tab Take 1 tablet (750 mg total) by mouth 2 (two) times daily.    lorazepam (ATIVAN) 1 MG tablet TAKE ONE TABLET BY MOUTH TWICE DAILY    mirabegron 50 mg Tb24 Take 1 tablet (50 mg total) by mouth once daily.    multivitamin (THERAGRAN) per tablet Take 1 tablet by mouth once daily.    ondansetron (ZOFRAN) 8 MG tablet Take 1 tablet (8 mg total) by mouth 4 (four) times daily as needed for Nausea.           Clinical Reference Information           Your Vitals Were     BP Pulse Height Weight Last Period BMI    129/68 64 5' 6" (1.676 m) 61.8 kg (136 lb 4.8 oz) (LMP Unknown) 22 kg/m2      Blood Pressure          Most Recent Value    BP  129/68      Allergies as of 3/13/2017     Tobradex [Tobramycin-dexamethasone]    Iodinated Contrast Media - Iv Dye    Morphine    " Latex    Phenytoin Sodium Extended      Immunizations Administered on Date of Encounter - 3/13/2017     None      Orders Placed During Today's Visit     Future Labs/Procedures Expected by Expires    MRI Brain W WO Contrast  6/13/2017 3/13/2018      Language Assistance Services     ATTENTION: Language assistance services are available, free of charge. Please call [phone].      ATENCIÓN: Si habla español, tiene a multani disposición servicios gratuitos de asistencia lingüística. Llame al [phone].     CHÚ Ý: N?u b?n nói Ti?ng Vi?t, có các d?ch v? h? tr? ngôn ng? mi?n phí dành cho b?n. G?i s? [phone].         [facility] complies with applicable Federal civil rights laws and does not discriminate on the basis of race, color, national origin, age, disability, or sex.

## 2017-03-14 ENCOUNTER — TELEPHONE (OUTPATIENT)
Dept: PHARMACY | Facility: CLINIC | Age: 72
End: 2017-03-14

## 2017-03-21 ENCOUNTER — CLINICAL SUPPORT (OUTPATIENT)
Dept: REHABILITATION | Facility: OTHER | Age: 72
End: 2017-03-21
Attending: PEDIATRICS
Payer: MEDICARE

## 2017-03-21 DIAGNOSIS — R39.15 URINARY URGENCY: ICD-10-CM

## 2017-03-21 DIAGNOSIS — N81.89 PELVIC FLOOR WEAKNESS: Primary | ICD-10-CM

## 2017-03-21 PROCEDURE — 97110 THERAPEUTIC EXERCISES: CPT | Mod: PO

## 2017-03-21 NOTE — PROGRESS NOTES
Patient: Naty St   [facility] #: [id number]   Diagnosis:   Encounter Diagnosis   Name Primary?    Pelvic floor weakness Yes      Date of start of care: 3/7/17  Date of treatment: 03/21/2017   Time in: 11:10  Time out: 11:55  Total treatment time: 45 minutes  # Visits: 2/20  Auth expiration: 12/31/17  POC expiration: 5/30/17  Outpatient Physical Therapy   Daily Note    Subjective     Pt states that she did perform her exercises but not as recommended; about half the time. Pt states that since our last session she has been having some secretions from her rectum - when she goes to the bathroom to urinate she will feel like she has to wipe back there. It has decreased since it started. Pt states she has had 1-2 episodes of urinary leakage since our last visit.    Pain: Current: 0/10     Objective       TREATMENT    Pt received individual therapeutic exercise to develop strength, coordination, endurance, motor control and core stability for 45 minutes including:    - 2 x 10 x 5'' kegals in supine with tactile cueing and verbal instruction; pt displays 2+/5 strength with good-fair holding ability and slow derecruitment; she required cueing to drop fully  - Diaphragmatic breathing with max instruction and tactile cueing      Education: Discussed progression of plan of care with patient; educated pt in activity modification; pt was instructed to increase kegals to 30 times/day and to begin performing diaphragmatic breathing at home. She verbalized understanding and was provided with a handout.      Assessment     Mrs. St tolerated session well without visible adverse effects. She continues to display PFM weakness with decreased endurance as well as global core weakness and decreased stability. Will progress core strengthening at pt's next visit. Mrs. St will continue to benefit from skilled PT intervention to maximize functional independence.     Pt is making good progress towards established goals. No  educational, cultural, or spiritual needs identified.    GOALS  Short Term Goals: 4 weeks (4/4/17)  1. Pt will demonstrated increase in PF muscle endurance to 5 sec x 10 reps per SEMG in order to improve PFM function to decrease urinary frequency.  2. Pt will demonstrate improvement in core strength as assessed by PT in order to improve functional strength and stability.   3. Pt will demonstrate ability to perform 10 quick flicks with complete derecruitment in order to improve management of urinary urgency/frequency.  2. Pt will tolerate HEP to improve impairments and independence with ADL's.     Long Term Goals: 12 weeks (5/30/17)  1. Pt will improve PF muscle endurance to 10 sec x 10 reps per SEMG in order to improve PFM function to maximize functional independence.  2. Pt will report improvement in urinary urgency/frequency and leakage in order to demonstrate improvement in overall quality of life and independent function.   3. Pt will demonstrate ability to bulge on command in order to demonstrate improvement in PFM function and management of bladder/bowel habits.  4. Pt will be independent with HEP and self management.    Plan     Continue with established POC, working toward PT goals.

## 2017-03-21 NOTE — PATIENT INSTRUCTIONS
Home Exercise Program: 03/21/2017      DIAPHRAGMATIC BREATHING     The diaphragm is a dome shaped muscle that forms the floor of the rib cage. It is the most efficient muscle for breathing and relaxation, although most people are not used to using the diaphragm. Diaphragmatic or belly breathing is an important technique to learn because it helps settle down or relax the autonomic nervous system. The correct use of diaphragmatic breathing can help to quiet brain activity resulting in the relaxation of all the muscles and organs of the body. This is accomplished by slow rhythmic breathing concentrated in the diaphragm muscle rather than the chest.    How to do proper relaxation breathing:     Start by lying on your back or reclining in a chair in a relaxed position. Place one hand on your chest and the other on your abdomen.   Relax your jaw by placing your tongue on the roof of your mouth and keeping your teeth slightly apart.    Take a deep breath in through your nose, letting the abdomen expand and rise while you keep your upper chest, neck and shoulders relaxed.    As you breathe out through your mouth (as if blowing out candles), allow your abdomen and chest to fall. Exhale completely.   Remember to breathe slowly.  Do not force your breathing. Do not hold your breath.   Repeat for _______ minutes.

## 2017-03-28 PROBLEM — R39.15 URINARY URGENCY: Status: ACTIVE | Noted: 2017-03-28

## 2017-03-30 ENCOUNTER — HOSPITAL ENCOUNTER (OUTPATIENT)
Dept: RADIOLOGY | Facility: HOSPITAL | Age: 72
Discharge: HOME | End: 2017-03-30
Attending: NURSE PRACTITIONER
Payer: MEDICARE

## 2017-03-30 DIAGNOSIS — C79.31 SECONDARY ADENOCARCINOMA OF BRAIN: ICD-10-CM

## 2017-03-30 DIAGNOSIS — C79.51 SECONDARY CANCER OF BONE: ICD-10-CM

## 2017-03-30 DIAGNOSIS — C34.31 MALIGNANT NEOPLASM OF LOWER LOBE OF RIGHT LUNG: ICD-10-CM

## 2017-03-30 PROCEDURE — A9552 F18 FDG: HCPCS

## 2017-03-30 PROCEDURE — 78815 PET IMAGE W/CT SKULL-THIGH: CPT | Mod: TC

## 2017-03-30 PROCEDURE — 78815 PET IMAGE W/CT SKULL-THIGH: CPT | Mod: 26,PS,, | Performed by: NUCLEAR MEDICINE

## 2017-03-31 ENCOUNTER — INFUSION (OUTPATIENT)
Dept: INFUSION THERAPY | Facility: HOSPITAL | Age: 72
End: 2017-03-31
Attending: INTERNAL MEDICINE
Payer: MEDICARE

## 2017-03-31 ENCOUNTER — OFFICE VISIT (OUTPATIENT)
Dept: HEMATOLOGY/ONCOLOGY | Facility: CLINIC | Age: 72
End: 2017-03-31
Payer: MEDICARE

## 2017-03-31 VITALS
WEIGHT: 138.25 LBS | HEIGHT: 66 IN | BODY MASS INDEX: 22.22 KG/M2 | OXYGEN SATURATION: 100 % | RESPIRATION RATE: 18 BRPM | HEART RATE: 70 BPM | TEMPERATURE: 99 F | SYSTOLIC BLOOD PRESSURE: 119 MMHG | DIASTOLIC BLOOD PRESSURE: 58 MMHG

## 2017-03-31 DIAGNOSIS — C34.31 MALIGNANT NEOPLASM OF LOWER LOBE OF RIGHT LUNG: Primary | ICD-10-CM

## 2017-03-31 DIAGNOSIS — C79.51 SECONDARY CANCER OF BONE: ICD-10-CM

## 2017-03-31 PROCEDURE — 3078F DIAST BP <80 MM HG: CPT | Mod: S$GLB,,, | Performed by: INTERNAL MEDICINE

## 2017-03-31 PROCEDURE — 99499 UNLISTED E&M SERVICE: CPT | Mod: S$GLB,,, | Performed by: INTERNAL MEDICINE

## 2017-03-31 PROCEDURE — 1126F AMNT PAIN NOTED NONE PRSNT: CPT | Mod: S$GLB,,, | Performed by: INTERNAL MEDICINE

## 2017-03-31 PROCEDURE — 1159F MED LIST DOCD IN RCRD: CPT | Mod: S$GLB,,, | Performed by: INTERNAL MEDICINE

## 2017-03-31 PROCEDURE — 96401 CHEMO ANTI-NEOPL SQ/IM: CPT

## 2017-03-31 PROCEDURE — 99999 PR PBB SHADOW E&M-EST. PATIENT-LVL V: CPT | Mod: PBBFAC,,, | Performed by: INTERNAL MEDICINE

## 2017-03-31 PROCEDURE — 99215 OFFICE O/P EST HI 40 MIN: CPT | Mod: S$GLB,,, | Performed by: INTERNAL MEDICINE

## 2017-03-31 PROCEDURE — 3074F SYST BP LT 130 MM HG: CPT | Mod: S$GLB,,, | Performed by: INTERNAL MEDICINE

## 2017-03-31 PROCEDURE — 1157F ADVNC CARE PLAN IN RCRD: CPT | Mod: S$GLB,,, | Performed by: INTERNAL MEDICINE

## 2017-03-31 PROCEDURE — 63600175 PHARM REV CODE 636 W HCPCS: Performed by: INTERNAL MEDICINE

## 2017-03-31 PROCEDURE — 1160F RVW MEDS BY RX/DR IN RCRD: CPT | Mod: S$GLB,,, | Performed by: INTERNAL MEDICINE

## 2017-03-31 RX ORDER — AMLODIPINE BESYLATE 5 MG/1
5 TABLET ORAL DAILY
Qty: 30 TABLET | Refills: 11 | Status: SHIPPED | OUTPATIENT
Start: 2017-03-31 | End: 2017-11-08 | Stop reason: SDUPTHER

## 2017-03-31 RX ADMIN — DENOSUMAB 120 MG: 120 INJECTION SUBCUTANEOUS at 04:03

## 2017-03-31 NOTE — NURSING
Pt arrived for xgeva following MD appt.  Pt states taking calcium at home, no dental issues or jaw pain.  Pt tolerated injection to right arm.  Discharged to home with appt calendar

## 2017-03-31 NOTE — PROGRESS NOTES
"Subjective:       Patient ID: Naty St is a 71 y.o. female.    Chief Complaint: No chief complaint on file.  Oncologic History:  Ms. Naty St was admitted to the hospital between 01/25/2016 and 01/28/2016. She has a history of pancreatic cancer 17 years ago, breast cancer and meningioma, presented to the hospital complaining of loss of consciousness. The patient apparently was in her normal state of health and she stood up to go to the bathroom and fell to the floor. The patient's daughter helped the mother up in the bathroom and noted that her mother's upper extremities were shaking and eye rolling. No reports of bowel or bladder incontinence, tongue biting or rolling. The second episode lasted about four minutes and she was extremely lethargic following that. Apparently, the patient had a meningioma resection done in the past; however, recently, underwent neurosurgical resection with Dr. 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    Additional immunohistochemical stains were performed which revealed the tumor cells to be are positive for TTF1 and negative for ER and GCDFP. The morphology and TTF1 positivity are most consistent with lung primary.    Of note, imaging scan at the end of January 2016 revealed a mass in the lung at 2 cm in the medial aspect of the apical segment of the right upper lobe abutting the mediastinum at the level of the azygous vein and abutting and possibly encasing the segmental bronchi and vessels of the apical segment of the right upper lobe and no pleural fluid was present. Also, there is an enlarged right paratracheal lymph node. No evidence of any metastatic disease at the pancreatic site with postoperative changes post Whipple disease  Her PET Scan from 2/15/16 reveal "Hypermetabolic mass in the right lung apex consistent with a primary malignancy. " "Hypermetabolic mediastinal lymph nodes consistent with metastatic disease. Right sacral hypermetabolic lesion consistent with metastatic disease, noting additional mildly sclerotic lesions in multiple vertebral bodies which do not demonstrate abnormal hypermetabolism  She underwent IR bone biopsy which revealed metastatic adenocarcinoma of lung origin. 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  She has completed focal RT to brain lesions in March 2016.    PET scan from 6/6/16 shows "Dramatic almost complete response to therapy."  Lovenox started after US lower ext 6/7/16 shows Acute complete occlusion of one of the left posterior tibial vein.Remote partial thrombus of the proximal left superficial femoral vein.  She is on Tarceva.   12/6/16 PET scan reveals "Stable right upper lobe lesion and right hilar lymph node. No new lesions identified. Trace left pleural effusion".  1/27/17 Renal u/s "Medical renal disease. Nonobstructive right nephrolithiasis"  1/31/17 PET - "Right upper lobe nodule and right hilar lymph node similar and very low grade activity.  There is no definite evidence of recurrence."       HPI Ms. St returns for follow up and to review her PET scan from 3/30/17 which reveals "Stable right upper lobe lesion and right hilar lymph node. No evidence of new lesions"  .  Review of Systems    Objective:      Physical Exam      LABS:  WBC   Date Value Ref Range Status   03/31/2017 4.16 3.90 - 12.70 K/uL Final     Hemoglobin   Date Value Ref Range Status   03/31/2017 10.1 (L) 12.0 - 16.0 g/dL Final     POC Hematocrit   Date Value Ref Range Status   01/12/2016 25 (L) 36 - 54 %PCV Final     Hematocrit   Date Value Ref Range Status   03/31/2017 30.7 (L) 37.0 - 48.5 % Final     Platelets   Date Value Ref Range Status   03/31/2017 269 150 - 350 K/uL Final     Gran #   Date Value Ref Range Status   03/31/2017 1.8 1.8 - 7.7 K/uL Final     Comment:     The ANC is based on a white cell differential from an "   automated cell counter. It has not been microscopically   reviewed for the presence of abnormal cells. Clinical   correlation is required.         Chemistry        Component Value Date/Time     03/31/2017 1434    K 4.7 03/31/2017 1434     (H) 03/31/2017 1434    CO2 18 (L) 03/31/2017 1434    BUN 27 (H) 03/31/2017 1434    CREATININE 1.6 (H) 03/31/2017 1434    GLU 96 03/31/2017 1434        Component Value Date/Time    CALCIUM 8.7 03/02/2017 1343    ALKPHOS 151 (H) 03/02/2017 1343    AST 39 03/02/2017 1343    ALT 78 (H) 03/02/2017 1343    BILITOT 1.0 03/02/2017 1343          Assessment:       1. Malignant neoplasm of lower lobe of right lung    2. Secondary cancer of bone        Plan:        1,2. She is doing very well clinically and PET scan is stable. She will proceed with Xgeva today and will return in 4 weeks for next dose of Xgeva and in 8 weeks to see me with repeat PET scan.    Above care plan was discussed with patient and accompanying  and son and all questions were addressed to their satisfaction

## 2017-03-31 NOTE — LETTER
March 31, 2017    Naty St  [address] [facility] [address]             [place] [facility]  [address] [facility] [address]  Phone: [phone] To Whom It May Concern:    Naty St is under my care for a diagnosis of Lung Cancer, for which she is receiving treatment. As Mrs. St has a history of falls, please allow my patient to bring one guest with her at each visit to the gym. She may use the walking track as well as the exercise equipment. If you need any additional information from me, please feel free to contact me at my office.      Sincerely,        Karrie Vazquez MD

## 2017-04-04 ENCOUNTER — TELEPHONE (OUTPATIENT)
Dept: NEPHROLOGY | Facility: CLINIC | Age: 72
End: 2017-04-04

## 2017-04-04 ENCOUNTER — LAB VISIT (OUTPATIENT)
Dept: LAB | Facility: HOSPITAL | Age: 72
End: 2017-04-04
Attending: INTERNAL MEDICINE
Payer: MEDICARE

## 2017-04-04 DIAGNOSIS — I10 ESSENTIAL HYPERTENSION: Chronic | ICD-10-CM

## 2017-04-04 DIAGNOSIS — E83.42 HYPOMAGNESEMIA: ICD-10-CM

## 2017-04-04 DIAGNOSIS — N20.0 CALCIUM OXALATE KIDNEY STONES: ICD-10-CM

## 2017-04-04 DIAGNOSIS — N18.4 CKD (CHRONIC KIDNEY DISEASE) STAGE 4, GFR 15-29 ML/MIN: ICD-10-CM

## 2017-04-04 DIAGNOSIS — N17.9 AKI (ACUTE KIDNEY INJURY): ICD-10-CM

## 2017-04-04 PROCEDURE — 83986 ASSAY PH BODY FLUID NOS: CPT

## 2017-04-04 PROCEDURE — 83945 ASSAY OF OXALATE: CPT

## 2017-04-07 ENCOUNTER — OFFICE VISIT (OUTPATIENT)
Dept: NEPHROLOGY | Facility: CLINIC | Age: 72
End: 2017-04-07
Payer: MEDICARE

## 2017-04-07 VITALS
BODY MASS INDEX: 22.22 KG/M2 | DIASTOLIC BLOOD PRESSURE: 56 MMHG | HEART RATE: 66 BPM | WEIGHT: 138.25 LBS | SYSTOLIC BLOOD PRESSURE: 100 MMHG | OXYGEN SATURATION: 98 % | HEIGHT: 66 IN

## 2017-04-07 DIAGNOSIS — E87.20 METABOLIC ACIDOSIS: ICD-10-CM

## 2017-04-07 DIAGNOSIS — R60.0 PERIPHERAL EDEMA: ICD-10-CM

## 2017-04-07 DIAGNOSIS — I10 ESSENTIAL HYPERTENSION: Chronic | ICD-10-CM

## 2017-04-07 DIAGNOSIS — E83.42 HYPOMAGNESEMIA: ICD-10-CM

## 2017-04-07 DIAGNOSIS — Z91.040 ALLERGY TO LATEX: ICD-10-CM

## 2017-04-07 DIAGNOSIS — N18.30 CKD (CHRONIC KIDNEY DISEASE) STAGE 3, GFR 30-59 ML/MIN: ICD-10-CM

## 2017-04-07 DIAGNOSIS — I82.403 ACUTE DEEP VEIN THROMBOSIS (DVT) OF BOTH LOWER EXTREMITIES, UNSPECIFIED VEIN: Primary | ICD-10-CM

## 2017-04-07 DIAGNOSIS — N17.9 AKI (ACUTE KIDNEY INJURY): ICD-10-CM

## 2017-04-07 PROBLEM — N18.4 CKD (CHRONIC KIDNEY DISEASE) STAGE 4, GFR 15-29 ML/MIN: Status: RESOLVED | Noted: 2017-02-13 | Resolved: 2017-04-07

## 2017-04-07 PROCEDURE — 1160F RVW MEDS BY RX/DR IN RCRD: CPT | Mod: S$GLB,,, | Performed by: INTERNAL MEDICINE

## 2017-04-07 PROCEDURE — 1157F ADVNC CARE PLAN IN RCRD: CPT | Mod: S$GLB,,, | Performed by: INTERNAL MEDICINE

## 2017-04-07 PROCEDURE — 1126F AMNT PAIN NOTED NONE PRSNT: CPT | Mod: S$GLB,,, | Performed by: INTERNAL MEDICINE

## 2017-04-07 PROCEDURE — 99213 OFFICE O/P EST LOW 20 MIN: CPT | Mod: S$GLB,,, | Performed by: INTERNAL MEDICINE

## 2017-04-07 PROCEDURE — 99499 UNLISTED E&M SERVICE: CPT | Mod: S$GLB,,, | Performed by: INTERNAL MEDICINE

## 2017-04-07 PROCEDURE — 3074F SYST BP LT 130 MM HG: CPT | Mod: S$GLB,,, | Performed by: INTERNAL MEDICINE

## 2017-04-07 PROCEDURE — 99999 PR PBB SHADOW E&M-EST. PATIENT-LVL III: CPT | Mod: PBBFAC,,, | Performed by: INTERNAL MEDICINE

## 2017-04-07 PROCEDURE — 3078F DIAST BP <80 MM HG: CPT | Mod: S$GLB,,, | Performed by: INTERNAL MEDICINE

## 2017-04-07 PROCEDURE — 1159F MED LIST DOCD IN RCRD: CPT | Mod: S$GLB,,, | Performed by: INTERNAL MEDICINE

## 2017-04-07 RX ORDER — SODIUM BICARBONATE 650 MG/1
650 TABLET ORAL 3 TIMES DAILY
Qty: 270 TABLET | Refills: 11 | COMMUNITY
Start: 2017-04-07 | End: 2017-04-17 | Stop reason: SDUPTHER

## 2017-04-07 NOTE — MR AVS SNAPSHOT
[facility] - Nephrology  [address]  [facility] [address]  Phone: [phone]  Fax: [phone]                  Naty St   2017 4:30 PM   Office Visit    Description:  Female : 1945   Provider:  Mihaela Bazzi MD   Department:  [facility] - Nephrology           Diagnoses this Visit        Comments    Acute deep vein thrombosis (DVT) of both lower extremities, unspecified vein    -  Primary     CKD (chronic kidney disease) stage 3, GFR 30-59 ml/min         Peripheral edema                To Do List           Future Appointments        Provider Department Dept Phone    2017 11:00 AM Lauren Shepley, PT [facility] [place] [address] [phone]    2017 1:00 PM LAB, HEMONC CANCER BLDG [facility] [phone]    2017 2:00 PM INJECTION, [facility] INFUSION [facility] [phone]    2017 10:30 AM Ivon Mata NP [facility] [phone]    2017 4:00 PM Chris Sheppard MD [facility] [phone]      Goals (5 Years of Data)     None      Follow-Up and Disposition     Return in about 4 years (around 2021).      [facility]     [facility] Care Line -  Assistance  Unless otherwise directed by your provider, please contact [facility], our nurse care line that is available for  assistance.     Registered nurses in the [facility] provide: appointment scheduling, clinical advisement, health education, and other advisory services.  Call: [phone] (toll free)               Medications           Message regarding Medications     Verify the changes and/or additions to your medication regime listed below are the same as discussed with your clinician today.  If any of these changes or additions are incorrect, please notify your healthcare provider.        STOP taking these medications     escitalopram oxalate (LEXAPRO) 10 MG tablet Take 1 tablet  (10 mg total) by mouth once daily.    hydrocodone-acetaminophen 5-325mg (NORCO) 5-325 mg per tablet Take 1 tablet by mouth every 6 (six) hours as needed for Pain.    ondansetron (ZOFRAN) 8 MG tablet Take 1 tablet (8 mg total) by mouth 4 (four) times daily as needed for Nausea.           Verify that the below list of medications is an accurate representation of the medications you are currently taking.  If none reported, the list may be blank. If incorrect, please contact your healthcare provider. Carry this list with you in case of emergency.           Current Medications     amitriptyline (ELAVIL) 25 MG tablet Take one tablet by mouth once daily    amlodipine (NORVASC) 5 MG tablet Take 1 tablet (5 mg total) by mouth once daily.    atenolol (TENORMIN) 50 MG tablet Take one tablet by mouth once daily    CREON CpDR TAKE ONE CAPSULE BY MOUTH THREE TIMES A DAY WITH MEALS    denosumab (XGEVA) 120 mg/1.7 mL (70 mg/mL) Soln Inject 120 mg into the skin every 28 days.    dronabinol (MARINOL) 5 MG capsule Take 1 capsule (5 mg total) by mouth 2 (two) times daily before meals.    enoxaparin (LOVENOX) 100 mg/mL Syrg Inject 1 mL (100 mg total) into the skin once daily.    ERGOCALCIFEROL, VITAMIN D2, (VITAMIN D ORAL) Take by mouth.    erlotinib (TARCEVA) 150 MG tablet Take 1 tablet (150 mg total) by mouth once daily.    levetiracetam (KEPPRA) 750 MG Tab Take 1 tablet (750 mg total) by mouth 2 (two) times daily.    lorazepam (ATIVAN) 1 MG tablet TAKE ONE TABLET BY MOUTH TWICE DAILY    mirabegron 50 mg Tb24 Take 1 tablet (50 mg total) by mouth once daily.    multivitamin (THERAGRAN) per tablet Take 1 tablet by mouth once daily.    ciprofloxacin HCl (CILOXAN) 0.3 % ophthalmic solution     clindamycin phosphate 1% (CLINDAGEL) 1 % gel Apply topically 2 (two) times daily.           Clinical Reference Information           Your Vitals Were     BP Pulse Height Weight Last Period SpO2    100/56 (BP Location: Right arm, Patient Position:  "Sitting) 66 5' 6" (1.676 m) 62.7 kg (138 lb 3.7 oz) (LMP Unknown) 98%    BMI                22.31 kg/m2          Blood Pressure          Most Recent Value    BP  (!)  100/56      Allergies as of 4/7/2017     Tobradex [Tobramycin-dexamethasone]    Iodinated Contrast Media - Iv Dye    Morphine    Latex    Phenytoin Sodium Extended      Immunizations Administered on Date of Encounter - 4/7/2017     None      Orders Placed During Today's Visit     Future Labs/Procedures Expected by Expires    Magnesium  4/7/2017 6/6/2018    Magnesium  7/6/2017 4/7/2018    Vitamin D  7/6/2017 4/7/2018    CBC auto differential  8/5/2017 4/7/2018    Protein / creatinine ratio, urine  8/5/2017 4/7/2018    PTH, intact  8/5/2017 4/7/2018    Renal function panel  8/5/2017 4/7/2018    Urinalysis  8/5/2017 4/7/2018    VAS US Doppler Venous Leg Left  As directed 4/7/2018    VAS US Doppler Venous Leg Right  As directed 4/7/2018      Instructions      1. Labs: venous dopplers of the lower extremities  Labs in 3 months     2.  Medications: if bp remains bellow 110 stop amlodipine and call    5. BP:  Take BP and pulse  twice daily for one week, record              Bring results  to next visit.              Goal :   <130/80    6. Diet:         Sodium: < 2000 milligrams daily including all food and drink      (one teaspoon of table salt has 2300 milligrams of sodium)      Follow weights call if > 5 lbs in one week      7. Please avoid or minimize all NSAIDS (ibuprofen, motrin, aleve, indocin, naprosyn) to minimize the risk to your kidneys    8. Return to clinic: 4 months       Language Assistance Services     ATTENTION: Language assistance services are available, free of charge. Please call [phone].      ATENCIÓN: Si habla tera, tiene a multani disposición servicios gratuitos de asistencia lingüística. Llame al [phone].     CHÚ Ý: N?u b?n nói Ti?ng Vi?t, có các d?ch v? h? tr? ngôn ng? mi?n phí dành cho b?n. G?i s? [phone].         " [facility] complies with applicable Federal civil rights laws and does not discriminate on the basis of race, color, national origin, age, disability, or sex.

## 2017-04-07 NOTE — Clinical Note
Please tell patient I would like her to start sodium bicarb 650 mg three times daily with meals, order placed

## 2017-04-07 NOTE — PATIENT INSTRUCTIONS
1. Labs: venous dopplers of the lower extremities  Labs in 3 months     2.  Medications: if bp remains bellow 110 stop amlodipine and call    5. BP:  Take BP and pulse  twice daily for one week, record              Bring results  to next visit.              Goal :   <130/80    6. Diet:         Sodium: < 2000 milligrams daily including all food and drink      (one teaspoon of table salt has 2300 milligrams of sodium)      Follow weights call if > 5 lbs in one week      7. Please avoid or minimize all NSAIDS (ibuprofen, motrin, aleve, indocin, naprosyn) to minimize the risk to your kidneys    8. Return to clinic: 4 months

## 2017-04-07 NOTE — PROGRESS NOTES
Subjective:       Patient ID: Naty St is a 71 y.o. 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  She denies, LUTS, SOB, dysuria, hematuria, + peripheral edema now bilaterally, + 4  Since  Increase in serum crt patient has been receiving 1L NS infusion 2 x weekly.    Interval HX:  Serum creatinine has remained 1.8-1.6 est gfr 25  Renal US  R lower pole non-obstructing stone, otherwise no acute findings   Serologies wnl  cpk wnl  Serum Mg 1.3 now on magnesium daily  UPRCT 0.11-.2   She occassionally has watery diarrhea, pedal edema increases with dependency, Her bp at home is about 120/70.  No LUTs dysuria, fevers or SOB.    Review of Systems   Constitutional: Positive for fatigue and unexpected weight change. Negative for appetite change, chills and fever.        30 lb weight loss since 9/2016   HENT: Negative for congestion, facial swelling, hearing loss, nosebleeds and trouble swallowing.    Eyes: Negative for pain, discharge, redness and visual disturbance.   Respiratory: Negative for cough, chest tightness, shortness of breath and wheezing.    Cardiovascular: Positive for leg swelling. Negative for chest pain and palpitations.        Increased +3 bilateral LE edema   Gastrointestinal: Negative for abdominal pain, constipation, diarrhea, nausea and vomiting.   Endocrine: Negative for cold intolerance, heat intolerance and polydipsia.   Genitourinary: Negative for decreased urine volume, difficulty urinating, dysuria, flank pain, hematuria and urgency.   Musculoskeletal:  "Negative for arthralgias, back pain, joint swelling and myalgias.   Skin: Negative for color change, pallor, rash and wound.   Neurological: Negative for dizziness, tremors, seizures, syncope, speech difficulty, weakness and headaches.   Hematological: Negative for adenopathy. Does not bruise/bleed easily.   Psychiatric/Behavioral: Negative for agitation, behavioral problems, dysphoric mood, self-injury and sleep disturbance.       Objective:     Blood pressure (!) 100/56, pulse 66, height 5' 6" (1.676 m), weight 62.7 kg (138 lb 3.7 oz), SpO2 98 %.      Physical Exam   Constitutional: She is oriented to person, place, and time. She appears well-developed and well-nourished. No distress.   Chronically ill, swallow complexion, NAD,    HENT:   Head: Normocephalic and atraumatic.   Mouth/Throat: No oropharyngeal exudate.   Eyes: Conjunctivae and EOM are normal. Pupils are equal, round, and reactive to light. Right eye exhibits no discharge. Left eye exhibits no discharge. No scleral icterus.   Neck: Normal range of motion. Neck supple. No JVD present. No tracheal deviation present. No thyromegaly present.   Cardiovascular: Normal rate, regular rhythm and normal heart sounds.  Exam reveals no gallop.    No murmur heard.  + 3 bilateral nonpitting edema, unable to assess pulses   Pulmonary/Chest: Effort normal and breath sounds normal. No stridor. No respiratory distress. She has no wheezes. She has no rales. She exhibits no tenderness.   Abdominal: Soft. Bowel sounds are normal. She exhibits no distension and no mass. There is no tenderness. There is no rebound and no guarding. No hernia.   Bladder not palpable   Musculoskeletal: She exhibits no edema or tenderness.   Lymphadenopathy:     She has no cervical adenopathy.   Neurological: She is alert and oriented to person, place, and time. She exhibits normal muscle tone.   Skin: Skin is warm and dry. No rash noted. She is not diaphoretic. No erythema.   Psychiatric: She " has a normal mood and affect. Judgment and thought content normal.   Nursing note and vitals reviewed.      Assessment:       1. Acute deep vein thrombosis (DVT) of both lower extremities, unspecified vein    2. CKD (chronic kidney disease) stage 3, GFR 30-59 ml/min    3. 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     STEFAN superimposed on CKD. STEFAN may be related to use of Epidermal GF inhibitor,with normal serologies, and benign urine sediment.  At this time would  Not perform a renal biopsy unless worsening proteinuria which I will trend,  Renal function appears to have stabilized and will follow serially    Diarrhea,  oral rehydration solution (recipe given as Pedialyte type formulas have K citrate which may make regulating serum k difficult to regulate with gfr  Of 25    Hypomagnesemia:  Likely related to Tarceva.  Patient is taking daily supplement  Will check level today.  Serum magnesium should be followed regularly while on tarceva.    CKD likely related to longstanding  HTN, nephrosclerosis with smoking history, possible reduced renal mass from scarring/ obstruction with nephrolithiasis and UTIs,  Stable function now.    Nephrolithiasis:  24 hr stone risk profile no acute need to treat as patient has only had one significant stone episode, woul encourage Po fluid  2 L daily results pending    Increased peripheral edema:  Repeat venous dopplers of Le  On lovenox,  If no improvement may need to add diuretic but with low bp would rather not at this time, weigh daily call for > 5 lb  change    HTN: bp low today, if remains low would stop amlodipine    Lab Results   Component Value Date    CREATININE 1.6 (H) 03/31/2017     Protein Creatinine Ratios: continue to follow  Prot/Creat Ratio, Ur   Date Value Ref Range Status   02/13/2017 0.24 (H) 0.00 - 0.20 Final   01/27/2017 0.11 0.00 - 0.20 Final       2. Metabolic Acidosis:  Begin  sodium bicarb 650 mg tid increase dose to 2 tabs tid  Lab Results   Component Value Date     03/31/2017    K 4.7 03/31/2017    CO2 18 (L) 03/31/2017         3. Renal osteodystrophy: PTH ordered   Check with vit d serum albumin 2.7 corrected calcium 9.1  Await 24 hr stone risk before add rocaltrol or sensipar  Lab Results   Component Value Date    .0 (H) 02/13/2017    CALCIUM 8.7 03/31/2017    PHOS 3.2 02/20/2017       4. Anemia:  As per hematology  Lab Results   Component Value Date    HGB 10.1 (L) 03/31/2017        5. HTN: stop amlodipine if bp remains below 110 systolic, consider echo if remains low      Medication: no change      Monitor BP as directed in instructions      7. Weight: Weight: 62.7 kg (138 lb 3.7 oz). she states that her daily weights   From reading flowsheets 30 lb weight loss since 9/2016  Hold on lasix at this time, despite peripheral edema she is  without respiratory symptoms.  Wt Readings from Last 3 Encounters:   04/07/17 62.7 kg (138 lb 3.7 oz)   03/31/17 62.7 kg (138 lb 3.7 oz)   03/13/17 61.8 kg (136 lb 4.8 oz)       8. Lipid management:   Lab Results   Component Value Date    LDLCALC 81.6 12/17/2015         9. Diet:  Education/referral: consider Nepro or BOOST      Sodium: 2 gm    Potassium:      Phosphorus:      Protein:      Fluid:       10. ESRD planing: not indicated at this time       Education:       Access:    DVT: on lovenox 1.5 mg/kg if gfr drops below 30 cc/min would reduce dose to 1mg/kg    13.  Please avoid or minimize all NSAIDS (ibuprofen, motrin, aleve, indocin, naprosyn) to minimize the risk to your kidneys.       14. Follow up in :3 months

## 2017-04-10 ENCOUNTER — TELEPHONE (OUTPATIENT)
Dept: HEMATOLOGY/ONCOLOGY | Facility: CLINIC | Age: 72
End: 2017-04-10

## 2017-04-10 ENCOUNTER — TELEPHONE (OUTPATIENT)
Dept: NEPHROLOGY | Facility: CLINIC | Age: 72
End: 2017-04-10

## 2017-04-10 NOTE — TELEPHONE ENCOUNTER
----- Message from Mari Aparicio sent at 4/10/2017 10:02 AM CDT -----  Contact: Self  Wants to touch base about recent visit with the kidney dr. Please call Basil at  [phone]

## 2017-04-10 NOTE — TELEPHONE ENCOUNTER
----- Message from Mari Aparicio sent at 4/10/2017  2:19 PM CDT -----  Contact: Son  Returning call. Please call [phone]

## 2017-04-10 NOTE — TELEPHONE ENCOUNTER
Returned call to patient's son.   Son is calling to report patient seeing nephrologist last Friday---  Nurse reviewed clinic note from dr monroe.  All issues are being addressed.

## 2017-04-11 ENCOUNTER — PATIENT MESSAGE (OUTPATIENT)
Dept: NEPHROLOGY | Facility: CLINIC | Age: 72
End: 2017-04-11

## 2017-04-12 LAB — STONE ANALYSIS-IMP: NORMAL

## 2017-04-17 ENCOUNTER — DOCUMENTATION ONLY (OUTPATIENT)
Dept: NEPHROLOGY | Facility: CLINIC | Age: 72
End: 2017-04-17

## 2017-04-17 DIAGNOSIS — E83.51 HYPOCALCEMIA: Primary | ICD-10-CM

## 2017-04-17 RX ORDER — CALCITRIOL 0.25 UG/1
0.25 CAPSULE ORAL
Qty: 36 CAPSULE | Refills: 0 | Status: SHIPPED | OUTPATIENT
Start: 2017-04-17 | End: 2017-07-16

## 2017-04-17 RX ORDER — SODIUM BICARBONATE 650 MG/1
650 TABLET ORAL 3 TIMES DAILY
Qty: 270 TABLET | Refills: 11 | COMMUNITY
Start: 2017-04-17 | End: 2017-07-03 | Stop reason: SDUPTHER

## 2017-04-17 NOTE — PROGRESS NOTES
24 stone risk profile 4/4/2017:  Hyperoxaluria: oxalate of 85 mg/dy and hypocitraturia: citrate of 80 mg/day with Urine ph of 6.1 and 2.5 L urine volume.    Rx:   low oxaalte diet and    With serum CO2 of 18, on sodium bicarb will increase to two tabs tid.  With borderline high serum k and gfr 30 I am reluctant to add K citrate.    Patient is on denusomab. Would follow her serum calcium and ionized calcium regularly

## 2017-04-18 ENCOUNTER — TELEPHONE (OUTPATIENT)
Dept: NEPHROLOGY | Facility: CLINIC | Age: 72
End: 2017-04-18

## 2017-04-18 NOTE — TELEPHONE ENCOUNTER
Begin rocaltrol .25 mcg three times a week m-w-f   And sodium bicarb 650 mg tab 2 tabs three times daily

## 2017-04-19 ENCOUNTER — TELEPHONE (OUTPATIENT)
Dept: INTERNAL MEDICINE | Facility: CLINIC | Age: 72
End: 2017-04-19

## 2017-04-19 ENCOUNTER — TELEPHONE (OUTPATIENT)
Dept: HEMATOLOGY/ONCOLOGY | Facility: CLINIC | Age: 72
End: 2017-04-19

## 2017-04-19 NOTE — TELEPHONE ENCOUNTER
Returned call to patient's son, shirley, who is calling to ask questions about the 2 new meds patient's nephrologist started her on. Nurse advised son to contact neph department about these meds. Son voiced understanding.    Son also notes patient has been having light yellow stools for the past month.- son advised to contact patient's PCP for this.  Son voiced understanding.

## 2017-04-19 NOTE — TELEPHONE ENCOUNTER
Spoke to pt son Basil. Pt c/o soft, loose stool x 1 month. C/o yellowish color of her stools. Denies blood, denies diarrhea, denies N?V. She is having about 1 BM daily. Pt started drinking PRONURISH- similar to ensure about a month ago to help her gain weight. They are thinking this could also be the cause of the change in her stools. sched pt for appt 4/21 9:15am. Pt confirmed appt date and time.

## 2017-04-19 NOTE — TELEPHONE ENCOUNTER
----- Message from Nickie Link MA sent at 4/19/2017 12:23 PM CDT -----  Contact: Justice / Basil Marcial [phone] ext [phone]   Requesting to speak with Dr Mars today. Stated he has some concerns in regard to the patient's health. Please call. Thanks!

## 2017-04-19 NOTE — TELEPHONE ENCOUNTER
----- Message from Mari Aparicio sent at 4/19/2017 11:01 AM CDT -----  Contact: Care giver  Calling in regards to pts health. Please call Basil [phone] ext [phone]

## 2017-04-19 NOTE — TELEPHONE ENCOUNTER
" I did it is called rocaltrol or "calcitriol"  It is a 1,25 vit d to be taken only three times weekly            Previous Messages       ----- Message -----      From: Negar Cohn MA      Sent: 4/18/2017   9:27 AM        To: Mihaela Bazzi MD      Pt stated that you did not want her to take the over the corner vd and you was going to put in a prescription.   ----- Message -----      From: Mihaela Bazzi MD      Sent: 4/17/2017   4:50 PM        To: Rose Mary NELSON Staff     Please tell the patient that I would like her to increase her sodium bicarb to 2 tabs three times daily, and please ask her to start a low oxalate diet which she can find at  Www.kidney.org   The [employer] website.   ----- Message -----      From: Suzie Barroso LPN      Sent: 4/17/2017  10:05 AM        To: Mihaela Bazzi MD         ----- Message -----      From: Rosario Matt      Sent: 4/17/2017   9:52 AM        To: Rose Mary NELSON Staff     Rose Mary   -   Calling to have an follow up discussing on the patient last visit with the Dr Dinero,                        "

## 2017-04-20 ENCOUNTER — TELEPHONE (OUTPATIENT)
Dept: NEPHROLOGY | Facility: CLINIC | Age: 72
End: 2017-04-20

## 2017-04-21 ENCOUNTER — TELEPHONE (OUTPATIENT)
Dept: PHARMACY | Facility: CLINIC | Age: 72
End: 2017-04-21

## 2017-04-21 ENCOUNTER — LAB VISIT (OUTPATIENT)
Dept: LAB | Facility: HOSPITAL | Age: 72
End: 2017-04-21
Attending: INTERNAL MEDICINE
Payer: MEDICARE

## 2017-04-21 ENCOUNTER — OFFICE VISIT (OUTPATIENT)
Dept: INTERNAL MEDICINE | Facility: CLINIC | Age: 72
End: 2017-04-21
Payer: MEDICARE

## 2017-04-21 VITALS
HEART RATE: 59 BPM | BODY MASS INDEX: 21.86 KG/M2 | HEIGHT: 66 IN | DIASTOLIC BLOOD PRESSURE: 72 MMHG | WEIGHT: 136 LBS | SYSTOLIC BLOOD PRESSURE: 120 MMHG

## 2017-04-21 DIAGNOSIS — I10 ESSENTIAL HYPERTENSION: Chronic | ICD-10-CM

## 2017-04-21 DIAGNOSIS — D32.9 MENINGIOMA: Chronic | ICD-10-CM

## 2017-04-21 DIAGNOSIS — R19.7 DIARRHEA, UNSPECIFIED TYPE: ICD-10-CM

## 2017-04-21 DIAGNOSIS — E44.0 MODERATE MALNUTRITION: ICD-10-CM

## 2017-04-21 DIAGNOSIS — C34.31 MALIGNANT NEOPLASM OF LOWER LOBE OF RIGHT LUNG: ICD-10-CM

## 2017-04-21 DIAGNOSIS — R19.7 DIARRHEA, UNSPECIFIED TYPE: Primary | ICD-10-CM

## 2017-04-21 LAB
ALBUMIN SERPL BCP-MCNC: 3.3 G/DL
ALP SERPL-CCNC: 57 U/L
ALT SERPL W/O P-5'-P-CCNC: 14 U/L
ANION GAP SERPL CALC-SCNC: 7 MMOL/L
AST SERPL-CCNC: 20 U/L
BASOPHILS # BLD AUTO: 0.02 K/UL
BASOPHILS NFR BLD: 0.4 %
BILIRUB SERPL-MCNC: 1.3 MG/DL
BUN SERPL-MCNC: 29 MG/DL
CALCIUM SERPL-MCNC: 9 MG/DL
CHLORIDE SERPL-SCNC: 115 MMOL/L
CO2 SERPL-SCNC: 20 MMOL/L
CREAT SERPL-MCNC: 1.6 MG/DL
DIFFERENTIAL METHOD: ABNORMAL
EOSINOPHIL # BLD AUTO: 0.1 K/UL
EOSINOPHIL NFR BLD: 2.5 %
ERYTHROCYTE [DISTWIDTH] IN BLOOD BY AUTOMATED COUNT: 12.3 %
EST. GFR  (AFRICAN AMERICAN): 37 ML/MIN/1.73 M^2
EST. GFR  (NON AFRICAN AMERICAN): 32 ML/MIN/1.73 M^2
GLUCOSE SERPL-MCNC: 82 MG/DL
HCT VFR BLD AUTO: 32.9 %
HGB BLD-MCNC: 10.2 G/DL
LYMPHOCYTES # BLD AUTO: 1.6 K/UL
LYMPHOCYTES NFR BLD: 29.3 %
MCH RBC QN AUTO: 29 PG
MCHC RBC AUTO-ENTMCNC: 31 %
MCV RBC AUTO: 94 FL
MONOCYTES # BLD AUTO: 0.5 K/UL
MONOCYTES NFR BLD: 9.6 %
NEUTROPHILS # BLD AUTO: 3.3 K/UL
NEUTROPHILS NFR BLD: 58 %
PLATELET # BLD AUTO: 247 K/UL
PMV BLD AUTO: 10.2 FL
POTASSIUM SERPL-SCNC: 4.7 MMOL/L
PROT SERPL-MCNC: 6.4 G/DL
RBC # BLD AUTO: 3.52 M/UL
SODIUM SERPL-SCNC: 142 MMOL/L
WBC # BLD AUTO: 5.6 K/UL

## 2017-04-21 PROCEDURE — 99499 UNLISTED E&M SERVICE: CPT | Mod: S$GLB,,, | Performed by: INTERNAL MEDICINE

## 2017-04-21 PROCEDURE — 85025 COMPLETE CBC W/AUTO DIFF WBC: CPT

## 2017-04-21 PROCEDURE — 1160F RVW MEDS BY RX/DR IN RCRD: CPT | Mod: S$GLB,,, | Performed by: INTERNAL MEDICINE

## 2017-04-21 PROCEDURE — 1159F MED LIST DOCD IN RCRD: CPT | Mod: S$GLB,,, | Performed by: INTERNAL MEDICINE

## 2017-04-21 PROCEDURE — 99214 OFFICE O/P EST MOD 30 MIN: CPT | Mod: S$GLB,,, | Performed by: INTERNAL MEDICINE

## 2017-04-21 PROCEDURE — 3078F DIAST BP <80 MM HG: CPT | Mod: S$GLB,,, | Performed by: INTERNAL MEDICINE

## 2017-04-21 PROCEDURE — 99999 PR PBB SHADOW E&M-EST. PATIENT-LVL IV: CPT | Mod: PBBFAC,,, | Performed by: INTERNAL MEDICINE

## 2017-04-21 PROCEDURE — 1126F AMNT PAIN NOTED NONE PRSNT: CPT | Mod: S$GLB,,, | Performed by: INTERNAL MEDICINE

## 2017-04-21 PROCEDURE — 36415 COLL VENOUS BLD VENIPUNCTURE: CPT

## 2017-04-21 PROCEDURE — 80053 COMPREHEN METABOLIC PANEL: CPT

## 2017-04-21 PROCEDURE — 3074F SYST BP LT 130 MM HG: CPT | Mod: S$GLB,,, | Performed by: INTERNAL MEDICINE

## 2017-04-21 NOTE — MR AVS SNAPSHOT
[facility]  [address]  [facility] [address]  Phone: [phone]  Fax: [phone]                  Naty St   2017 9:15 AM   Office Visit    Description:  Female : 1945   Provider:  Olvin Mars MD   Department:  [facility]           Reason for Visit     Stool Color Change     Diarrhea           Diagnoses this Visit        Comments    Diarrhea, unspecified type    -  Primary     Moderate malnutrition         Essential hypertension         Meningioma         Malignant neoplasm of lower lobe of right lung                To Do List           Future Appointments        Provider Department Dept Phone    2017 10:10 AM LAB, APPOINTMENT [facility] INTMED [facility] [address] [phone]    2017 1:00 PM LAB, HEMONC CANCER BLDG [facility] [address] [phone]    2017 2:00 PM INJECTION, [facility] INFUSION [facility] [address] [phone]    2017 3:00 PM VASCULAR, LAB [facility] [phone]    2017 10:30 AM Ivon Mata NP [facility] [phone]      Goals (5 Years of Data)     None      [facility]     [facility] Nurse Care Line - 24/7 Assistance  Unless otherwise directed by your provider, please contact [facility], our nurse care line that is available for 24/7 assistance.     Registered nurses in the [facility] provide: appointment scheduling, clinical advisement, health education, and other advisory services.  Call: [phone] (toll free)               Medications           Message regarding Medications     Verify the changes and/or additions to your medication regime listed below are the same as discussed with your clinician today.  If any of these changes or additions are incorrect, please notify your healthcare provider.             Verify that the below list of medications is an accurate representation of  "the medications you are currently taking.  If none reported, the list may be blank. If incorrect, please contact your healthcare provider. Carry this list with you in case of emergency.           Current Medications     amitriptyline (ELAVIL) 25 MG tablet Take one tablet by mouth once daily    amlodipine (NORVASC) 5 MG tablet Take 1 tablet (5 mg total) by mouth once daily.    atenolol (TENORMIN) 50 MG tablet Take one tablet by mouth once daily    calcitRIOL (ROCALTROL) 0.25 MCG Cap Take 1 capsule (0.25 mcg total) by mouth 3 (three) times a week.    ciprofloxacin HCl (CILOXAN) 0.3 % ophthalmic solution     clindamycin phosphate 1% (CLINDAGEL) 1 % gel Apply topically 2 (two) times daily.    CREON CpDR TAKE ONE CAPSULE BY MOUTH THREE TIMES A DAY WITH MEALS    denosumab (XGEVA) 120 mg/1.7 mL (70 mg/mL) Soln Inject 120 mg into the skin every 28 days.    dronabinol (MARINOL) 5 MG capsule Take 1 capsule (5 mg total) by mouth 2 (two) times daily before meals.    enoxaparin (LOVENOX) 100 mg/mL Syrg Inject 1 mL (100 mg total) into the skin once daily.    ERGOCALCIFEROL, VITAMIN D2, (VITAMIN D ORAL) Take by mouth.    erlotinib (TARCEVA) 150 MG tablet Take 1 tablet (150 mg total) by mouth once daily.    levetiracetam (KEPPRA) 750 MG Tab Take 1 tablet (750 mg total) by mouth 2 (two) times daily.    lorazepam (ATIVAN) 1 MG tablet TAKE ONE TABLET BY MOUTH TWICE DAILY    mirabegron 50 mg Tb24 Take 1 tablet (50 mg total) by mouth once daily.    multivitamin (THERAGRAN) per tablet Take 1 tablet by mouth once daily.    sodium bicarbonate 650 MG tablet Take 1 tablet (650 mg total) by mouth 3 (three) times daily. Increase dose to 2 60 mg tabs by mouth three times daily.           Clinical Reference Information           Your Vitals Were     BP Pulse Height Weight Last Period BMI    120/72 59 5' 5.5" (1.664 m) 61.7 kg (136 lb 0.4 oz) (LMP Unknown) 22.29 kg/m2      Blood Pressure          Most Recent Value    BP  120/72      Allergies as " of 4/21/2017     Tobradex [Tobramycin-dexamethasone]    Iodinated Contrast Media - Oral And Iv Dye    Morphine    Latex    Phenytoin Sodium Extended      Immunizations Administered on Date of Encounter - 4/21/2017     None      Orders Placed During Today's Visit     Future Labs/Procedures Expected by Expires    CBC auto differential  4/21/2017 4/21/2018    Clostridium difficile EIA  4/21/2017 6/20/2018    Comprehensive metabolic panel  4/21/2017 4/21/2018    Giardia / Cryptosporidum, EIA  4/21/2017 6/20/2018    Stool culture  4/21/2017 4/21/2018    Stool Exam-Ova,Cysts,Parasites  4/21/2017 4/21/2018      Language Assistance Services     ATTENTION: Language assistance services are available, free of charge. Please call [phone].      ATENCIÓN: Si habla tera, tiene a multani disposición servicios gratuitos de asistencia lingüística. Llame al [phone].     CHÚ Ý: N?u b?n nói Ti?ng Vi?t, có các d?ch v? h? tr? ngôn ng? mi?n phí dành cho b?n. G?i s? [phone].         [facility] complies with applicable Federal civil rights laws and does not discriminate on the basis of race, color, national origin, age, disability, or sex.

## 2017-04-21 NOTE — PROGRESS NOTES
Subjective:       Patient ID: Naty St is a 71 y.o. female.    Chief Complaint: Stool Color Change (tan color) and Diarrhea (loose, soft stools x 6 weeks)    HPI Comments: Patient with remote history of pancreatic cancer, breast cancer, meningioma, comes in complaining of a few weeks of soft and watery stool with tan color.  It started about a week or 2 after starting nutritional supplement Pronourish.  It's similar to ensure but has worked great for maintaining weight.  She thought that the supplement may have caused the change but she cut the amount and it hasn't changed.  She tried an Imodium which firmed up the stool for a day or 2 then it went back.  She has no pain and states her weight has been stable which is good news.  Liver and kidney labs were normal a few weeks ago but I would like to reassess those.  She is hoping to find an answer but doesn't to stop the nutritional supplement unless she has to.    Diarrhea    Associated symptoms include arthralgias. Pertinent negatives include no abdominal pain, chills, coughing or fever.     Review of Systems   Constitutional: Positive for fatigue (chronic) and unexpected weight change (Patient had been losing weight until a few weeks ago). Negative for appetite change, chills and fever.   HENT: Negative for sore throat.    Respiratory: Negative for cough, shortness of breath and wheezing.    Cardiovascular: Negative for chest pain and leg swelling.   Gastrointestinal: Positive for diarrhea. Negative for abdominal pain and constipation.   Genitourinary: Negative for difficulty urinating.   Musculoskeletal: Positive for arthralgias. Negative for neck pain and neck stiffness.   Skin: Negative for rash.       Objective:      Physical Exam   Constitutional: She is oriented to person, place, and time. She appears well-developed and well-nourished. No distress.   Thin almost frail-appearing female in no acute distress   HENT:   Head: Normocephalic and  atraumatic.   Mouth/Throat: Oropharynx is clear and moist. No oropharyngeal exudate.   Eyes: Conjunctivae are normal. Pupils are equal, round, and reactive to light. No scleral icterus.   Neck: Normal range of motion. Neck supple. No thyromegaly present.   Cardiovascular: Normal rate and regular rhythm.    No murmur heard.  Pulmonary/Chest: Effort normal and breath sounds normal. She has no wheezes.   Abdominal: Soft. Bowel sounds are normal. She exhibits no distension. There is no tenderness.   Musculoskeletal: She exhibits no tenderness.   Lymphadenopathy:     She has no cervical adenopathy.   Neurological: She is alert and oriented to person, place, and time.   Skin: No rash noted.   Psychiatric: She has a normal mood and affect. Her behavior is normal.       Assessment:       1. Diarrhea, unspecified type    2. Moderate malnutrition    3. Essential hypertension    4. Meningioma    5. Malignant neoplasm of lower lobe of right lung        Plan:       Naty was seen today for stool color change and diarrhea.    Diagnoses and all orders for this visit:    Diarrhea, unspecified type  -     CBC auto differential; Future  -     Comprehensive metabolic panel; Future  -     Clostridium difficile EIA; Future  -     Giardia / Cryptosporidum, EIA; Future  -     Stool culture; Future  -     Stool Exam-Ova,Cysts,Parasites; Future    Moderate malnutrition    Essential hypertension    Meningioma    Malignant neoplasm of lower lobe of right lung        Totally stop nutritional supplement for a few days while waiting on labs and stool studies.  Follow-up after above

## 2017-04-24 ENCOUNTER — PATIENT MESSAGE (OUTPATIENT)
Dept: INTERNAL MEDICINE | Facility: CLINIC | Age: 72
End: 2017-04-24

## 2017-04-24 ENCOUNTER — TELEPHONE (OUTPATIENT)
Dept: INTERNAL MEDICINE | Facility: CLINIC | Age: 72
End: 2017-04-24

## 2017-04-24 DIAGNOSIS — R10.9 ABDOMINAL PAIN, UNSPECIFIED LOCATION: ICD-10-CM

## 2017-04-24 DIAGNOSIS — R19.7 DIARRHEA, UNSPECIFIED TYPE: Primary | ICD-10-CM

## 2017-04-24 NOTE — TELEPHONE ENCOUNTER
----- Message from Olvin Mars MD sent at 4/24/2017  2:47 PM CDT -----  Contact: Lab Client Services  See below. Pt will need to give a loose stool sample if still having diarrhea. Let me know.   ----- Message -----     From: Sedrick Narvaez     Sent: 4/24/2017   2:13 PM       To: Olvin Mars MD    Hi,           my name is Sedrick we received a specimen for Clostridium Difficile test. The test was unable to be performed due to the stool sample was formed stool. If you have any questions regarding this please contact client services at [phone]. Thank You.

## 2017-04-24 NOTE — TELEPHONE ENCOUNTER
If stool is better off Supplement, then she does not need to repeat the stool test. That was only for diarrhea. If she still wishes to see Gastro for possible colon issues, I am fine with that too. I am just glad the diarrhea resolved.

## 2017-04-24 NOTE — TELEPHONE ENCOUNTER
Spoke to pt, she states she is not having diarrhea anymore. The stools are getting better every day. Pt is asking if there is another test she can do besides the stool sample? She is concerned for colon issues. Pt is also asking if she can see Gastro for her Gi problems. Please advise orders if OK

## 2017-04-25 ENCOUNTER — TELEPHONE (OUTPATIENT)
Dept: PHARMACY | Facility: CLINIC | Age: 72
End: 2017-04-25

## 2017-04-25 ENCOUNTER — PATIENT MESSAGE (OUTPATIENT)
Dept: INTERNAL MEDICINE | Facility: CLINIC | Age: 72
End: 2017-04-25

## 2017-04-26 ENCOUNTER — TELEPHONE (OUTPATIENT)
Dept: INTERNAL MEDICINE | Facility: CLINIC | Age: 72
End: 2017-04-26

## 2017-04-26 NOTE — TELEPHONE ENCOUNTER
----- Message from Shiloh Waddell sent at 4/26/2017  9:56 AM CDT -----  Contact: Basil/Son/[phone] cell  Patient son is calling in regards to needing to speak with the nurse to clarify what is the next step for patient. Please call and advise.             Thank you.

## 2017-04-27 ENCOUNTER — TELEPHONE (OUTPATIENT)
Dept: NEPHROLOGY | Facility: CLINIC | Age: 72
End: 2017-04-27

## 2017-04-27 NOTE — PROGRESS NOTES
I spoke with patient's son at length about Ms St's:    - weight loss and appetite.  She has lost another 5 lbs.  She is not on a diuretic, and ahs a poor appetite.  I have suggested that they speak with their oncologist and PCP about increasing the marinol dose, use a juice based supplement such as,  I think, Resource rather a dairy based supplement, and add extra egg white to her diet as a good source of high biologic protein.     -She has a h/o nephrolithaisis with oxaluria and it seems she eats a lot of peanut butter which is high in oxalate, I have suggested reducing this and again using egg whites and speaking with her nutritionist.

## 2017-04-27 NOTE — TELEPHONE ENCOUNTER
----- Message from Janna Kay sent at 4/27/2017  9:33 AM CDT -----  Contact: PT  Would like a call, did not state specific details.    Call: [phone]

## 2017-04-28 ENCOUNTER — INFUSION (OUTPATIENT)
Dept: INFUSION THERAPY | Facility: HOSPITAL | Age: 72
End: 2017-04-28
Attending: INTERNAL MEDICINE
Payer: MEDICARE

## 2017-04-28 ENCOUNTER — TELEPHONE (OUTPATIENT)
Dept: NEPHROLOGY | Facility: CLINIC | Age: 72
End: 2017-04-28

## 2017-04-28 ENCOUNTER — HOSPITAL ENCOUNTER (OUTPATIENT)
Dept: VASCULAR SURGERY | Facility: CLINIC | Age: 72
Discharge: HOME | End: 2017-04-28
Attending: INTERNAL MEDICINE
Payer: MEDICARE

## 2017-04-28 DIAGNOSIS — C34.31 MALIGNANT NEOPLASM OF LOWER LOBE OF RIGHT LUNG: Primary | ICD-10-CM

## 2017-04-28 DIAGNOSIS — I82.403 ACUTE DEEP VEIN THROMBOSIS (DVT) OF BOTH LOWER EXTREMITIES, UNSPECIFIED VEIN: ICD-10-CM

## 2017-04-28 DIAGNOSIS — R60.0 PERIPHERAL EDEMA: ICD-10-CM

## 2017-04-28 DIAGNOSIS — N18.30 CKD (CHRONIC KIDNEY DISEASE) STAGE 3, GFR 30-59 ML/MIN: ICD-10-CM

## 2017-04-28 PROCEDURE — 63600175 PHARM REV CODE 636 W HCPCS: Performed by: INTERNAL MEDICINE

## 2017-04-28 PROCEDURE — 96401 CHEMO ANTI-NEOPL SQ/IM: CPT

## 2017-04-28 PROCEDURE — 93970 EXTREMITY STUDY: CPT | Mod: S$GLB,,, | Performed by: SURGERY

## 2017-04-28 RX ORDER — ATENOLOL 50 MG/1
TABLET ORAL
Qty: 30 TABLET | Refills: 3 | Status: SHIPPED | OUTPATIENT
Start: 2017-04-28 | End: 2017-08-01

## 2017-04-28 RX ADMIN — DENOSUMAB 120 MG: 120 INJECTION SUBCUTANEOUS at 02:04

## 2017-04-30 RX ORDER — LORAZEPAM 1 MG/1
TABLET ORAL
Qty: 60 TABLET | Refills: 2 | Status: SHIPPED | OUTPATIENT
Start: 2017-04-30 | End: 2017-08-08 | Stop reason: SDUPTHER

## 2017-05-02 ENCOUNTER — PATIENT MESSAGE (OUTPATIENT)
Dept: PSYCHIATRY | Facility: CLINIC | Age: 72
End: 2017-05-02

## 2017-05-04 ENCOUNTER — TELEPHONE (OUTPATIENT)
Dept: PHARMACY | Facility: CLINIC | Age: 72
End: 2017-05-04

## 2017-05-04 ENCOUNTER — OFFICE VISIT (OUTPATIENT)
Dept: PSYCHIATRY | Facility: CLINIC | Age: 72
End: 2017-05-04
Payer: MEDICARE

## 2017-05-04 ENCOUNTER — TELEPHONE (OUTPATIENT)
Dept: HEMATOLOGY/ONCOLOGY | Facility: CLINIC | Age: 72
End: 2017-05-04

## 2017-05-04 ENCOUNTER — TELEPHONE (OUTPATIENT)
Dept: INTERNAL MEDICINE | Facility: CLINIC | Age: 72
End: 2017-05-04

## 2017-05-04 DIAGNOSIS — F32.A DEPRESSION, UNSPECIFIED DEPRESSION TYPE: Primary | ICD-10-CM

## 2017-05-04 DIAGNOSIS — F43.21 ADJUSTMENT DISORDER WITH DEPRESSED MOOD: Primary | ICD-10-CM

## 2017-05-04 PROCEDURE — 90834 PSYTX W PT 45 MINUTES: CPT | Mod: S$GLB,,, | Performed by: PSYCHOLOGIST

## 2017-05-04 PROCEDURE — 99999 PR PBB SHADOW E&M-EST. PATIENT-LVL II: CPT | Mod: PBBFAC,,, | Performed by: PSYCHOLOGIST

## 2017-05-04 RX ORDER — AMITRIPTYLINE HYDROCHLORIDE 50 MG/1
50 TABLET, FILM COATED ORAL NIGHTLY
Qty: 30 TABLET | Refills: 2 | Status: SHIPPED | OUTPATIENT
Start: 2017-05-04 | End: 2017-06-26 | Stop reason: SDUPTHER

## 2017-05-04 NOTE — TELEPHONE ENCOUNTER
Informed patient Dr. Vazquez sent elavil 50mg to her pharmacy at the request of dr snider.  Patient voiced understanding.

## 2017-05-04 NOTE — TELEPHONE ENCOUNTER
Spoke with patient.  She is having difficulty with lovenox injections.  Advised patient to come in tomorrow so nurse could instruct her how to administer properly.  Patient voiced understanding.

## 2017-05-04 NOTE — TELEPHONE ENCOUNTER
Patient presented to the pharmacy to  her Tarceva refill 5/4/17. We were able to fill it while she waited.

## 2017-05-04 NOTE — PROGRESS NOTES
PSYCHO-ONCOLOGY NOTE/ Individual Psychotherapy     Date: 5/4/2017   Site:  [facility]         Therapeutic Intervention: Met with patient.  Outpatient - Behavior modifying psychotherapy 45 min - CPT code 06333    Chief complaint/reason for encounter: depression     Objective:  Naty St arrived promptly for the session accompanied by her , who did not participate in the visit.  Ms. St was independently ambulatory at the time of session. The patient was fully cooperative throughout the session.  Appearance: age appropriate, casually dressed, well groomed  Behavior/Cooperation: friendly and cooperative  Speech: Not pressured, clearly audible, no slurring; less word blocking than at prior visits   Mood:  Depressed, agitated  Affect: tearful throughout  Thought Process: goal-directed, logical  Thought Content: normal,  No delusions or paranoia; did not appear to be responding to internal stimuli during the session  Orientation: grossly intact  Memory: Grossly intact  Attention Span/Concentration: Attends to session with difficulty- frequent derailment and word-blocking  Fund of Knowledge: average  Estimate of Intelligence: average from verbal skills and history  Cognition: grossly intact  Insight: patient has awareness of illness; good insight into own behavior and behavior of others  Judgment: the patient's behavior is adequate to circumstances    Interval history and content of current session: Patient discussed social strain and increased depression/anxiety.  Patient very dissatisfied with her relationship and is considering moving out of her home.  She is planning to leave to go to [place] for 2 weeks with her daughter this weekend.  Her  has had recent arguments with her children (out of character) and is behaving in a manner inconsistent with long term behavior.  Patient was encouraged to discuss these changes with her 's physicians.    Patient has made behavioral gains since  last visit: now driving, belongs to a gym, seeing friends more frequently; still hampered by low interest and energy level- stopped Lexapro after 1 week due to increased HR.  Sleep poor last few nights due to irritability with , but generally okay. (8.5 hours/night)         Risk parameters:   Patient reports no suicidal ideation  Patient reports no homicidal ideation  Patient reports no self-injurious behavior  Patient reports no violent behavior   Safety needs:  None at this time      Verbal deficits: None     Patient's response to intervention:The patient's response to intervention is accepting.     Progress toward goals and other mental status changes:  The patient's progress toward goals is good.        Progress to date:Progress as Expected      Goals from last visit: partially met      Patient reported outcomes:    Distress Thermometer:   Distress Score    Distress Score: 10        Practical Problems Physical Problems         Housing: Yes Bathing / Dressing: Yes                         Treatment Decisions: Yes             Family Problems Fatigue: Yes      Feeling Swollen: Yes    Dealing with Partner: Yes              Family Health Issues: Yes            Emotional Problems      Depression: Yes          Nose Dry / Congested: Yes    Nervousness: Yes       Sadness: Yes      Worry: Yes      Lost of Interest in Usual Activities: Yes Sleep: Yes          Spiritual/Religions Concerns               Other Problems             PHQ-9= 17   MCKAY-7= 13    Plan:individual psychotherapy and medication management by physician  · Target symptoms: depression  · Why chosen therapy is appropriate versus another modality: relevant to diagnosis, evidence based practice  · Outcome monitoring methods: self-report  · Therapeutic intervention type: behavior modifying psychotherapy      Lifestyle Changes:    Exercise:  Continue regular gym time, walking with friend   Stress management:  Time away from  in [place], yoga,  meditation   Social engagement:  Time with friend   Nutrition:  Improved over last visit.   Smoking Cessation:   Therapy:  Discuss medication options with Dr. Vazquez, RTC x 3 weeks (when back from [place]); talk to 's PCP about behavioral changes    Prognosis:  good    Diagnosis:     ICD-10-CM ICD-9-CM   1. Adjustment disorder with depressed mood F43.21 309.0       Return to clinic: 2 weeks     Length of Service (minutes direct face-to-face contact): 45    Tano Quintero, PhD  [place] License #[id number]

## 2017-05-04 NOTE — TELEPHONE ENCOUNTER
----- Message from Mari Aparicio sent at 5/4/2017  9:04 AM CDT -----  Contact: Self   Pt is having problems sticking the injection in her skin. If the needle goes in she gets a bump. She has tingling in left arm and feet are swollen every morning. Pt is having trouble with injections please call pt asap [phone].

## 2017-05-04 NOTE — TELEPHONE ENCOUNTER
----- Message from Alina Yeager sent at 5/4/2017  9:00 AM CDT -----  Contact: Patient  The patient says she is on a daily injection of Lovenox.  The patient says she cannot find a place to stick herself anymore in the belly or upper thighs.  She would like to get help from a nurse.  Please call her at [phone].    Thanks!

## 2017-05-09 ENCOUNTER — PATIENT MESSAGE (OUTPATIENT)
Dept: NEUROSURGERY | Facility: CLINIC | Age: 72
End: 2017-05-09

## 2017-05-09 DIAGNOSIS — C79.31 SECONDARY ADENOCARCINOMA OF BRAIN: ICD-10-CM

## 2017-05-09 RX ORDER — LEVETIRACETAM 750 MG/1
750 TABLET ORAL 2 TIMES DAILY
Qty: 60 TABLET | Refills: 3 | Status: SHIPPED | OUTPATIENT
Start: 2017-05-09 | End: 2017-09-14 | Stop reason: SDUPTHER

## 2017-05-09 NOTE — TELEPHONE ENCOUNTER
----- Message from Maya Young sent at 5/9/2017  8:38 AM CDT -----  Contact: shirley (son) @ [phone]  Calling to [facility] a refill for levetiracetam (KEPPRA) 750 MG Tab.        [facility] [address] [address]  Phone: [phone] Fax: [phone]

## 2017-05-25 ENCOUNTER — HOSPITAL ENCOUNTER (OUTPATIENT)
Dept: RADIOLOGY | Facility: HOSPITAL | Age: 72
Discharge: HOME | End: 2017-05-25
Attending: INTERNAL MEDICINE
Payer: MEDICARE

## 2017-05-25 ENCOUNTER — TELEPHONE (OUTPATIENT)
Dept: PHARMACY | Facility: CLINIC | Age: 72
End: 2017-05-25

## 2017-05-25 VITALS — WEIGHT: 134.81 LBS | BODY MASS INDEX: 22.09 KG/M2

## 2017-05-25 DIAGNOSIS — C34.31 MALIGNANT NEOPLASM OF LOWER LOBE OF RIGHT LUNG: ICD-10-CM

## 2017-05-25 PROCEDURE — 78815 PET IMAGE W/CT SKULL-THIGH: CPT | Mod: 26,,, | Performed by: RADIOLOGY

## 2017-05-25 PROCEDURE — A9552 F18 FDG: HCPCS

## 2017-05-26 ENCOUNTER — OFFICE VISIT (OUTPATIENT)
Dept: HEMATOLOGY/ONCOLOGY | Facility: CLINIC | Age: 72
End: 2017-05-26
Payer: MEDICARE

## 2017-05-26 ENCOUNTER — INFUSION (OUTPATIENT)
Dept: INFUSION THERAPY | Facility: HOSPITAL | Age: 72
End: 2017-05-26
Attending: INTERNAL MEDICINE
Payer: MEDICARE

## 2017-05-26 VITALS
BODY MASS INDEX: 21.69 KG/M2 | WEIGHT: 134.94 LBS | RESPIRATION RATE: 18 BRPM | DIASTOLIC BLOOD PRESSURE: 66 MMHG | OXYGEN SATURATION: 97 % | HEIGHT: 66 IN | HEART RATE: 65 BPM | SYSTOLIC BLOOD PRESSURE: 144 MMHG | TEMPERATURE: 98 F

## 2017-05-26 DIAGNOSIS — C79.31 SECONDARY ADENOCARCINOMA OF BRAIN: ICD-10-CM

## 2017-05-26 DIAGNOSIS — C34.31 MALIGNANT NEOPLASM OF LOWER LOBE OF RIGHT LUNG: Primary | ICD-10-CM

## 2017-05-26 DIAGNOSIS — C79.51 SECONDARY CANCER OF BONE: ICD-10-CM

## 2017-05-26 DIAGNOSIS — I82.403 ACUTE DEEP VEIN THROMBOSIS (DVT) OF BOTH LOWER EXTREMITIES, UNSPECIFIED VEIN: ICD-10-CM

## 2017-05-26 DIAGNOSIS — Z51.11 ENCOUNTER FOR ANTINEOPLASTIC CHEMOTHERAPY: ICD-10-CM

## 2017-05-26 PROCEDURE — 99999 PR PBB SHADOW E&M-EST. PATIENT-LVL IV: CPT | Mod: PBBFAC,,, | Performed by: INTERNAL MEDICINE

## 2017-05-26 PROCEDURE — 1126F AMNT PAIN NOTED NONE PRSNT: CPT | Mod: S$GLB,,, | Performed by: INTERNAL MEDICINE

## 2017-05-26 PROCEDURE — 63600175 PHARM REV CODE 636 W HCPCS: Performed by: INTERNAL MEDICINE

## 2017-05-26 PROCEDURE — 99215 OFFICE O/P EST HI 40 MIN: CPT | Mod: S$GLB,,, | Performed by: INTERNAL MEDICINE

## 2017-05-26 PROCEDURE — 96401 CHEMO ANTI-NEOPL SQ/IM: CPT

## 2017-05-26 PROCEDURE — 99499 UNLISTED E&M SERVICE: CPT | Mod: S$GLB,,, | Performed by: INTERNAL MEDICINE

## 2017-05-26 PROCEDURE — 1159F MED LIST DOCD IN RCRD: CPT | Mod: S$GLB,,, | Performed by: INTERNAL MEDICINE

## 2017-05-26 RX ORDER — ENOXAPARIN SODIUM 100 MG/ML
1.5 INJECTION SUBCUTANEOUS DAILY
Qty: 30 SYRINGE | Refills: 3 | Status: CANCELLED | OUTPATIENT
Start: 2017-05-26

## 2017-05-26 RX ADMIN — DENOSUMAB 120 MG: 120 INJECTION SUBCUTANEOUS at 02:05

## 2017-05-26 NOTE — NURSING
Pt arrived for xgeva following MD appt.  Labs reviewed with pt.  Pt states taking calcium at home, no jaw pain or dental issues.  Pt tolerated injection to right arm.  Discharged to home with June appt calendar.

## 2017-05-26 NOTE — PROGRESS NOTES
"Subjective:       Patient ID: Naty St is a 71 y.o. female.    Chief Complaint: No chief complaint on file.  Oncologic History:  Ms. Naty St was admitted to the hospital between 01/25/2016 and 01/28/2016. She has a history of pancreatic cancer 17 years ago, breast cancer and meningioma, presented to the hospital complaining of loss of consciousness. The patient apparently was in her normal state of health and she stood up to go to the bathroom and fell to the floor. The patient's daughter helped the mother up in the bathroom and noted that her mother's upper extremities were shaking and eye rolling. No reports of bowel or bladder incontinence, tongue biting or rolling. The second episode lasted about four minutes and she was extremely lethargic following that. Apparently, the patient had a meningioma resection done in the past; however, recently, underwent neurosurgical resection with Dr. 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    Additional immunohistochemical stains were performed which revealed the tumor cells to be are positive for TTF1 and negative for ER and GCDFP. The morphology and TTF1 positivity are most consistent with lung primary.    Of note, imaging scan at the end of January 2016 revealed a mass in the lung at 2 cm in the medial aspect of the apical segment of the right upper lobe abutting the mediastinum at the level of the azygous vein and abutting and possibly encasing the segmental bronchi and vessels of the apical segment of the right upper lobe and no pleural fluid was present. Also, there is an enlarged right paratracheal lymph node. No evidence of any metastatic disease at the pancreatic site with postoperative changes post Whipple disease  Her PET Scan from 2/15/16 reveal "Hypermetabolic mass in the right lung apex consistent with a primary malignancy. " "Hypermetabolic mediastinal lymph nodes consistent with metastatic disease. Right sacral hypermetabolic lesion consistent with metastatic disease, noting additional mildly sclerotic lesions in multiple vertebral bodies which do not demonstrate abnormal hypermetabolism  She underwent IR bone biopsy which revealed metastatic adenocarcinoma of lung origin. 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  She has completed focal RT to brain lesions in March 2016.    PET scan from 6/6/16 shows "Dramatic almost complete response to therapy."  Lovenox started after US lower ext 6/7/16 shows Acute complete occlusion of one of the left posterior tibial vein.Remote partial thrombus of the proximal left superficial femoral vein.  She is on Tarceva.   12/6/16 PET scan reveals "Stable right upper lobe lesion and right hilar lymph node. No new lesions identified. Trace left pleural effusion".  1/27/17 Renal u/s "Medical renal disease. Nonobstructive right nephrolithiasis"  1/31/17 PET - "Right upper lobe nodule and right hilar lymph node similar and very low grade activity.  There is no definite evidence of recurrence."         HPI Ms. St returns for follow up and to review her PET scan which reveals "Slight increase in the metabolism of the right upper lobe nodule and stable right hilar lymph node.  No progression"  She feels well and denies any new complaints.        Review of Systems   Constitutional: Negative for appetite change, fatigue and unexpected weight change.   HENT: Negative for mouth sores.    Eyes: Negative for visual disturbance.   Respiratory: Negative for cough and shortness of breath.    Cardiovascular: Negative for chest pain.   Gastrointestinal: Negative for abdominal pain and diarrhea.   Genitourinary: Negative for frequency.   Musculoskeletal: Negative for back pain.   Skin: Negative for rash.   Neurological: Negative for headaches.   Hematological: Negative for adenopathy.   Psychiatric/Behavioral: The " patient is not nervous/anxious.    All other systems reviewed and are negative.      Objective:      Physical Exam   Constitutional: She is oriented to person, place, and time. She appears well-developed and well-nourished.   HENT:   Mouth/Throat: No oropharyngeal exudate.   Cardiovascular: Normal rate and normal heart sounds.    Pulmonary/Chest: Effort normal and breath sounds normal. She has no wheezes.   Abdominal: Soft. Bowel sounds are normal. There is no tenderness.   Musculoskeletal: She exhibits no edema or tenderness.   Lymphadenopathy:     She has no cervical adenopathy.   Neurological: She is alert and oriented to person, place, and time. Coordination normal.   Skin: Skin is warm and dry. No rash noted.   Psychiatric: She has a normal mood and affect. Judgment and thought content normal.   Vitals reviewed.        LABS:  WBC   Date Value Ref Range Status   05/26/2017 3.42 (L) 3.90 - 12.70 K/uL Final     Hemoglobin   Date Value Ref Range Status   05/26/2017 10.3 (L) 12.0 - 16.0 g/dL Final     POC Hematocrit   Date Value Ref Range Status   01/12/2016 25 (L) 36 - 54 %PCV Final     Hematocrit   Date Value Ref Range Status   05/26/2017 32.4 (L) 37.0 - 48.5 % Final     Platelets   Date Value Ref Range Status   05/26/2017 237 150 - 350 K/uL Final     Gran #   Date Value Ref Range Status   05/26/2017 1.6 (L) 1.8 - 7.7 K/uL Final     Comment:     The ANC is based on a white cell differential from an   automated cell counter. It has not been microscopically   reviewed for the presence of abnormal cells. Clinical   correlation is required.         Chemistry        Component Value Date/Time     05/26/2017 1225    K 4.1 05/26/2017 1225     (H) 05/26/2017 1225    CO2 20 (L) 05/26/2017 1225    BUN 27 (H) 05/26/2017 1225    CREATININE 1.8 (H) 05/26/2017 1225    GLU 87 05/26/2017 1225        Component Value Date/Time    CALCIUM 8.9 05/26/2017 1225    ALKPHOS 53 (L) 05/26/2017 1225    AST 25 05/26/2017 1225     ALT 16 05/26/2017 1225    BILITOT 0.8 05/26/2017 1225          Assessment:       1. Malignant neoplasm of lower lobe of right lung    2. Secondary adenocarcinoma of brain    3. Secondary cancer of bone    4. Encounter for antineoplastic chemotherapy    5. Acute deep vein thrombosis (DVT) of both lower extremities, unspecified vein        Plan:        1,2,3,4. She will continue with Tarceva and Xgeva and will return in 4 weeks for next cycle. Will see her in 8 weeks with repeat scans.  5. DC lovenox. Switch to Xarelto.    Above care plan was discussed with patient and all questions were addressed to her satisfaction

## 2017-05-27 ENCOUNTER — PATIENT MESSAGE (OUTPATIENT)
Dept: HEMATOLOGY/ONCOLOGY | Facility: CLINIC | Age: 72
End: 2017-05-27

## 2017-05-29 ENCOUNTER — TELEPHONE (OUTPATIENT)
Dept: HEMATOLOGY/ONCOLOGY | Facility: CLINIC | Age: 72
End: 2017-05-29

## 2017-05-29 ENCOUNTER — TELEPHONE (OUTPATIENT)
Dept: PSYCHIATRY | Facility: CLINIC | Age: 72
End: 2017-05-29

## 2017-05-29 NOTE — TELEPHONE ENCOUNTER
Spoke to patient by phone re: now show 5/29. Patient having GI difficulties this AM and requests to reschedule.  Done.  New appt 6-6-17 OMAR Quintero

## 2017-05-29 NOTE — TELEPHONE ENCOUNTER
----- Message from Tano Quintero, PhD sent at 5/29/2017 11:21 AM CDT -----  Orestes De Paz-  I spoke to Mrs. St just now.  She requests that you call her about the calcium, as she is having difficulty with the new Epic update.  Thank you,  OMAR Quintero

## 2017-05-29 NOTE — TELEPHONE ENCOUNTER
Informed patient to increase her calcium to 3 tablets per day.    Letter will be mailed to patient's home.

## 2017-05-29 NOTE — LETTER
May 29, 2017    Naty St  [address] [facility] [place] [address]             [place] [facility]  [address]  [place] [address]  Phone: [phone] To Whom It May Concern:    Naty St is presently under my care for a diagnosis of Lung Cancer for which she is receiving chemotherapy. It is my opinion she will not be able to return to work in the foreseeable future. If you need any additional information, please feel free to contact me at my office.    Sincerely,        Karrie Vazquez MD

## 2017-05-29 NOTE — TELEPHONE ENCOUNTER
Returned call to patient, who is calling to state she is taking calcium with d3 1200mg bid. Patient is wondering if she should start taking it tid. Per patient, dr sullivan requested her to contact the clinic today with dosage.    Also, patient is requesting a letter to be written stating she can't return back to work and therefore needs to be excused from her outstanding student loans.    Nurse informed patient she would contact her back after speaking with dr sullivan. Patient voiced understanding.    Message routed to dr sullivan

## 2017-06-05 ENCOUNTER — OFFICE VISIT (OUTPATIENT)
Dept: NEUROSURGERY | Facility: CLINIC | Age: 72
End: 2017-06-05
Payer: MEDICARE

## 2017-06-05 ENCOUNTER — HOSPITAL ENCOUNTER (OUTPATIENT)
Dept: RADIOLOGY | Facility: HOSPITAL | Age: 72
Discharge: HOME | End: 2017-06-05
Attending: NEUROLOGICAL SURGERY
Payer: MEDICARE

## 2017-06-05 VITALS
TEMPERATURE: 99 F | HEIGHT: 66 IN | HEART RATE: 66 BPM | SYSTOLIC BLOOD PRESSURE: 124 MMHG | DIASTOLIC BLOOD PRESSURE: 72 MMHG | WEIGHT: 134.5 LBS | BODY MASS INDEX: 21.62 KG/M2

## 2017-06-05 DIAGNOSIS — C79.31 METASTATIC CANCER TO BRAIN: Primary | ICD-10-CM

## 2017-06-05 DIAGNOSIS — C79.31 METASTATIC CANCER TO BRAIN: ICD-10-CM

## 2017-06-05 PROCEDURE — 99213 OFFICE O/P EST LOW 20 MIN: CPT | Mod: S$GLB,,, | Performed by: NEUROLOGICAL SURGERY

## 2017-06-05 PROCEDURE — 70551 MRI BRAIN STEM W/O DYE: CPT | Mod: 26,,, | Performed by: RADIOLOGY

## 2017-06-05 PROCEDURE — 1159F MED LIST DOCD IN RCRD: CPT | Mod: S$GLB,,, | Performed by: NEUROLOGICAL SURGERY

## 2017-06-05 PROCEDURE — 1126F AMNT PAIN NOTED NONE PRSNT: CPT | Mod: S$GLB,,, | Performed by: NEUROLOGICAL SURGERY

## 2017-06-05 PROCEDURE — 99999 PR PBB SHADOW E&M-EST. PATIENT-LVL III: CPT | Mod: PBBFAC,,, | Performed by: NEUROLOGICAL SURGERY

## 2017-06-05 NOTE — LETTER
June 5, 2017      Olvin Mars MD  [address]  [facility] [address]           [facility]  [address]  [facility] [address]  Phone: [phone]          Patient: Naty St   MR Number: [id number]   YOB: 1945   Date of Visit: 6/5/2017       Dear Dr. Olvin Mars:    Thank you for referring Naty St to me for evaluation. Attached you will find relevant portions of my assessment and plan of care.    If you have questions, please do not hesitate to call me. I look forward to following Naty St along with you.    Sincerely,    Salty Ferrera MD    Enclosure  CC:  No Recipients    If you would like to receive this communication electronically, please contact [email or web address] or [phone] to request more information on [employer] access.    For providers and/or their staff who would like to refer a patient to [facility], please contact us through our one-stop-shop provider referral line, [facility], at [phone].    If you feel you have received this communication in error or would no longer like to receive these types of communications, please e-mail [email or web address]

## 2017-06-05 NOTE — PROGRESS NOTES
This office note has been dictated.  Naty St returned in neurosurgical followup to the office today.  She has   had no new difficulties over the past three months.  She has had no seizures.    She continues to note that her balance is not as steady, but she has taken no   falls.  She feels her energy level is basically stable.  She has not regained   the weight she lost, but has not lost more weight either.  She continues to be   followed by Dr. Vazquez in Oncology and apparently PET scans have been stable.    On examination today, she is speaking clearly.  Her incision remains well   healed.  Eye movements are good.  She has normal speech and shows no focal   deficits.    MRI of the brain was done at [facility] today and compared to her preop   study of 12/23/15.  The large left frontal tumor has been completely excised.    The brain has reexpanded.  There is no evidence for recurrent tumor and no   findings to suggest any new metastasis.  Contrast was not used in today's study.    The brain seems to be stable.  I will plan to have her return in about six   months with a followup scan.  This will be two years from operation.      TONY/SYLVAIN  dd: 06/05/2017 15:38:27 (CDT)  td: 06/06/2017 13:34:51 (CDT)  Doc ID   #[id number]  Job ID #[id number]    CC: Naty Maciel

## 2017-06-08 ENCOUNTER — PATIENT MESSAGE (OUTPATIENT)
Dept: PSYCHIATRY | Facility: CLINIC | Age: 72
End: 2017-06-08

## 2017-06-09 ENCOUNTER — OFFICE VISIT (OUTPATIENT)
Dept: PSYCHIATRY | Facility: CLINIC | Age: 72
End: 2017-06-09
Payer: MEDICARE

## 2017-06-09 DIAGNOSIS — F43.21 ADJUSTMENT DISORDER WITH DEPRESSED MOOD: Primary | ICD-10-CM

## 2017-06-09 PROCEDURE — 90834 PSYTX W PT 45 MINUTES: CPT | Mod: S$GLB,,, | Performed by: PSYCHOLOGIST

## 2017-06-09 PROCEDURE — 99999 PR PBB SHADOW E&M-EST. PATIENT-LVL II: CPT | Mod: PBBFAC,,, | Performed by: PSYCHOLOGIST

## 2017-06-09 NOTE — PROGRESS NOTES
PSYCHO-ONCOLOGY NOTE/ Individual Psychotherapy     Date: 6/9/2017   Site:  [facility]         Therapeutic Intervention: Met with patient.  Outpatient - Behavior modifying psychotherapy 45 min - CPT code 49183    Chief complaint/reason for encounter: depression     Objective:  Naty St arrived promptly for the session accompanied by her , who did not participate in the visit.  Ms. St was independently ambulatory at the time of session. The patient was fully cooperative throughout the session.  Appearance: age appropriate, casually dressed, well groomed  Behavior/Cooperation: friendly and cooperative  Speech: Not pressured, clearly audible, no slurring; less word blocking than at prior visits   Mood:  Depressed, agitated  Affect: tearful throughout  Thought Process: goal-directed, logical  Thought Content: normal,  No delusions or paranoia; did not appear to be responding to internal stimuli during the session  Orientation: grossly intact  Memory: Grossly intact  Attention Span/Concentration: Attends to session with difficulty- frequent derailment and word-blocking  Fund of Knowledge: average  Estimate of Intelligence: average from verbal skills and history  Cognition: grossly intact  Insight: patient has awareness of illness; good insight into own behavior and behavior of others  Judgment: the patient's behavior is adequate to circumstances    Interval history and content of current session: Patient discussed increased depression/anxiety. Patient's youngest son (who is blind and has a substance abuse disorder) has gone missing in [place] (x 3 weeks).  Patient has taken appropriate steps to try to locate him, but her options are limited since he is an adult.  Patient notes severe distress due to concern about his safety. (Sleep and mood had improved before this event.) Patient remains very dissatisfied with her relationship and is still considering moving out of her home.  She enjoyed her  trip to [place], showing her that she feels better in his absence. She is going back to [place] next month.  for 2 weeks with her daughter this weekend.  Her  has had recent arguments with her children (out of character) and is behaving in a manner inconsistent with long term behavior.        Risk parameters:   Patient reports no suicidal ideation  Patient reports no homicidal ideation  Patient reports no self-injurious behavior  Patient reports no violent behavior   Safety needs:  None at this time      Verbal deficits: None     Patient's response to intervention:The patient's response to intervention is accepting.     Progress toward goals and other mental status changes:  The patient's progress toward goals is good.        Progress to date:Progress as Expected      Goals from last visit: partially met      Patient reported outcomes:    Distress Thermometer:   Distress Score    Distress Score: 6        Practical Problems Physical Problems     Appearance: Yes         Insurance / Financial: Yes         Changes in Urination: Yes                    Eating: Yes    Family Problems Fatigue: Yes    Dealing with Children: Yes      Dealing with Partner: Yes         Getting Around: Yes    Family Health Issues: Yes        Memory / Concentration: Yes   Emotional Problems      Depression: Yes       Fears: Yes       Nervousness: Yes       Sadness: Yes      Worry: Yes Skin Dry / Itchy: Yes    Lost of Interest in Usual Activities: Yes Sleep: Yes          Spiritual/Religions Concerns Tingling in Hands / Feet: Yes             Other Problems             PHQ-9= 7   MCKAY-7= 10    Plan:individual psychotherapy and medication management by physician  · Target symptoms: depression  · Why chosen therapy is appropriate versus another modality: relevant to diagnosis, evidence based practice  · Outcome monitoring methods: self-report  · Therapeutic intervention type: behavior modifying psychotherapy      Lifestyle Changes:    Exercise:   Continue regular gym time, walking with friend, spend time with son Basil   Stress management:  Time away from  in [place], yoga, meditation   Social engagement:  Time with friend   Nutrition:  Improved over last visit.   Smoking Cessation:   Therapy:  talk to 's PCP about behavioral changes    Prognosis:  good    Diagnosis:     ICD-10-CM ICD-9-CM   1. Adjustment disorder with depressed mood F43.21 309.0       Return to clinic: 1 week     Length of Service (minutes direct face-to-face contact): 45    Tano Quintero PhD  [place] License #[id number]

## 2017-06-15 ENCOUNTER — OFFICE VISIT (OUTPATIENT)
Dept: PSYCHIATRY | Facility: CLINIC | Age: 72
End: 2017-06-15
Payer: MEDICARE

## 2017-06-15 DIAGNOSIS — F43.21 ADJUSTMENT DISORDER WITH DEPRESSED MOOD: Primary | ICD-10-CM

## 2017-06-15 PROCEDURE — 99999 PR PBB SHADOW E&M-EST. PATIENT-LVL II: CPT | Mod: PBBFAC,,, | Performed by: PSYCHOLOGIST

## 2017-06-15 PROCEDURE — 90834 PSYTX W PT 45 MINUTES: CPT | Mod: S$GLB,,, | Performed by: PSYCHOLOGIST

## 2017-06-15 NOTE — PROGRESS NOTES
"PSYCHO-ONCOLOGY NOTE/ Individual Psychotherapy     Date: 6/15/2017   Site:  [facility]         Therapeutic Intervention: Met with patient.  Outpatient - Behavior modifying psychotherapy 45 min - CPT code 82690    Chief complaint/reason for encounter: depression     Objective:  Naty St arrived promptly for the session accompanied by her , who did not participate in the visit.  Ms. St was independently ambulatory at the time of session. The patient was fully cooperative throughout the session.  Appearance: age appropriate, casually dressed, well groomed  Behavior/Cooperation: friendly and cooperative  Speech: Not pressured, clearly audible, no slurring; less word blocking than at prior visits   Mood:  Depressed, agitated  Affect: tearful throughout  Thought Process: goal-directed, logical  Thought Content: normal,  No delusions or paranoia; did not appear to be responding to internal stimuli during the session  Orientation: grossly intact  Memory: Grossly intact  Attention Span/Concentration: Attends to session with difficulty- frequent derailment and word-blocking  Fund of Knowledge: average  Estimate of Intelligence: average from verbal skills and history  Cognition: grossly intact  Insight: patient has awareness of illness; good insight into own behavior and behavior of others  Judgment: the patient's behavior is adequate to circumstances    Interval history and content of current session: Patient discussed feeling "elated" after her son made contact with her.  She continues to have numerous questions about his absence.  she is hopeful that he will keep in touch more regularly now.  Her distress had increased markedly during the time he was missing. Patient remains very dissatisfied with her relationship and is planning to move out fairly soon.  She is not concerned that her 's behavior may be health related. She is returning to [place] in a few weeks.      Discussed " dissatisfaction with [employer] home and goals to find a new place of Church.       Risk parameters:   Patient reports no suicidal ideation  Patient reports no homicidal ideation  Patient reports no self-injurious behavior  Patient reports no violent behavior   Safety needs:  None at this time      Verbal deficits: None     Patient's response to intervention:The patient's response to intervention is accepting.     Progress toward goals and other mental status changes:  The patient's progress toward goals is good.        Progress to date:Progress as Expected      Goals from last visit: partially met      Patient reported outcomes:    Distress Thermometer:   Distress Score    Distress Score: 4        Practical Problems Physical Problems                         Changes in Urination: Yes                    Eating: Yes    Family Problems      Dealing with Children: Yes      Dealing with Partner: Yes                       Memory / Concentration: Yes   Emotional Problems                                       Skin Dry / Itchy: Yes      Sleep: Yes          Spiritual/Religions Concerns Tingling in Hands / Feet: Yes   Spritual / Mandaeism Concerns: Yes         Other Problems             (while her son was missing)   PHQ-9= 12   MCKAY-7= 16    Plan:individual psychotherapy and medication management by physician  · Target symptoms: depression  · Why chosen therapy is appropriate versus another modality: relevant to diagnosis, evidence based practice  · Outcome monitoring methods: self-report  · Therapeutic intervention type: behavior modifying psychotherapy      Lifestyle Changes:    Exercise:  Continue regular gym time    Stress management:  Time away from  in [place], yoga, meditation   Social engagement:  Time with son, consider visiting new churches   Nutrition:  Improved over last visit.   Smoking Cessation:   Therapy:  talk to 's PCP about behavioral changes    Prognosis:  good    Diagnosis:     ICD-10-CM ICD-9-CM    1. Adjustment disorder with depressed mood F43.21 309.0       Return to clinic: 1 week     Length of Service (minutes direct face-to-face contact): 45    Tano Quintero, PhD  [place] License #[id number]

## 2017-06-23 ENCOUNTER — INFUSION (OUTPATIENT)
Dept: INFUSION THERAPY | Facility: HOSPITAL | Age: 72
End: 2017-06-23
Attending: INTERNAL MEDICINE
Payer: MEDICARE

## 2017-06-23 DIAGNOSIS — C34.31 MALIGNANT NEOPLASM OF LOWER LOBE OF RIGHT LUNG: Primary | ICD-10-CM

## 2017-06-23 PROCEDURE — 96401 CHEMO ANTI-NEOPL SQ/IM: CPT

## 2017-06-23 PROCEDURE — 63600175 PHARM REV CODE 636 W HCPCS: Performed by: INTERNAL MEDICINE

## 2017-06-23 RX ADMIN — DENOSUMAB 120 MG: 120 INJECTION SUBCUTANEOUS at 02:06

## 2017-06-23 NOTE — NURSING
Pt arrived for xgeva.  Labs reviewed with pt.  Instructed pt to f/u with nephrologist per Dr. Vazquez due to increase in creatine.  Pt states she will.  Pt tolerated injection to right arm.  Discharged to next appt

## 2017-06-24 ENCOUNTER — PATIENT MESSAGE (OUTPATIENT)
Dept: NEPHROLOGY | Facility: CLINIC | Age: 72
End: 2017-06-24

## 2017-06-26 DIAGNOSIS — F32.A DEPRESSION, UNSPECIFIED DEPRESSION TYPE: ICD-10-CM

## 2017-06-26 DIAGNOSIS — N18.30 CKD (CHRONIC KIDNEY DISEASE) STAGE 3, GFR 30-59 ML/MIN: Primary | ICD-10-CM

## 2017-06-26 RX ORDER — AMITRIPTYLINE HYDROCHLORIDE 50 MG/1
50 TABLET, FILM COATED ORAL NIGHTLY
Qty: 30 TABLET | Refills: 2 | Status: SHIPPED | OUTPATIENT
Start: 2017-06-26 | End: 2017-11-20 | Stop reason: SDUPTHER

## 2017-06-27 ENCOUNTER — TELEPHONE (OUTPATIENT)
Dept: HEMATOLOGY/ONCOLOGY | Facility: CLINIC | Age: 72
End: 2017-06-27

## 2017-06-27 NOTE — TELEPHONE ENCOUNTER
----- Message from Mari Aparicio sent at 6/27/2017  1:05 PM CDT -----  Contact: Self   Pt wants to get new teeth for her mouth. Requesting Zandra to call her back   Call [phone]

## 2017-06-27 NOTE — TELEPHONE ENCOUNTER
Informed patient she may have dentures fitted tomorrow. No major dental work being performed.  Patient voiced understanding.

## 2017-06-28 ENCOUNTER — TELEPHONE (OUTPATIENT)
Dept: PHARMACY | Facility: CLINIC | Age: 72
End: 2017-06-28

## 2017-06-29 ENCOUNTER — OFFICE VISIT (OUTPATIENT)
Dept: OPTOMETRY | Facility: CLINIC | Age: 72
End: 2017-06-29
Payer: MEDICARE

## 2017-06-29 DIAGNOSIS — H52.203 MYOPIA WITH ASTIGMATISM AND PRESBYOPIA, BILATERAL: ICD-10-CM

## 2017-06-29 DIAGNOSIS — H52.13 MYOPIA WITH ASTIGMATISM AND PRESBYOPIA, BILATERAL: ICD-10-CM

## 2017-06-29 DIAGNOSIS — I10 ESSENTIAL HYPERTENSION: Primary | ICD-10-CM

## 2017-06-29 DIAGNOSIS — H52.4 MYOPIA WITH ASTIGMATISM AND PRESBYOPIA, BILATERAL: ICD-10-CM

## 2017-06-29 PROCEDURE — 99499 UNLISTED E&M SERVICE: CPT | Mod: S$GLB,,, | Performed by: OPTOMETRIST

## 2017-06-29 PROCEDURE — 92015 DETERMINE REFRACTIVE STATE: CPT | Mod: S$GLB,,, | Performed by: OPTOMETRIST

## 2017-06-29 PROCEDURE — 99999 PR PBB SHADOW E&M-EST. PATIENT-LVL II: CPT | Mod: PBBFAC,,, | Performed by: OPTOMETRIST

## 2017-06-29 PROCEDURE — 92014 COMPRE OPH EXAM EST PT 1/>: CPT | Mod: S$GLB,,, | Performed by: OPTOMETRIST

## 2017-06-29 NOTE — PROGRESS NOTES
HPI     Last eye exam was 12/15/15 with Dr. Brunson.  Patient states didn't change glasses after last visit. Wanted to get a new   glasses rx today so she can order a new pair online (needs PD   measurement). Occasionally sees floaters OU but has seen them for years.  Patient denies diplopia, headaches, flashes, and pain.      Last edited by Bia Lackey on 6/29/2017  9:36 AM. (History)        ROS     Negative for: Constitutional, Gastrointestinal, Neurological, Skin,   Genitourinary, Musculoskeletal, HENT, Endocrine, Cardiovascular, Eyes,   Respiratory, Psychiatric, Allergic/Imm, Heme/Lymph    Last edited by Alina Brunson, OD on 6/29/2017  2:17 PM. (History)        Assessment /Plan     For exam results, see Encounter Report.    Essential hypertension    Myopia with astigmatism and presbyopia, bilateral          1.  No retinopathy--monitor yearly.  BP control.  Eye health normal OU.  2.  Bifocal rx given          RTC 1 year for routine exam.

## 2017-07-03 ENCOUNTER — LAB VISIT (OUTPATIENT)
Dept: LAB | Facility: HOSPITAL | Age: 72
End: 2017-07-03
Attending: INTERNAL MEDICINE
Payer: MEDICARE

## 2017-07-03 ENCOUNTER — TELEPHONE (OUTPATIENT)
Dept: NEPHROLOGY | Facility: CLINIC | Age: 72
End: 2017-07-03

## 2017-07-03 ENCOUNTER — OFFICE VISIT (OUTPATIENT)
Dept: NEPHROLOGY | Facility: CLINIC | Age: 72
End: 2017-07-03
Payer: MEDICARE

## 2017-07-03 VITALS
WEIGHT: 134.5 LBS | HEIGHT: 66 IN | BODY MASS INDEX: 21.62 KG/M2 | OXYGEN SATURATION: 97 % | SYSTOLIC BLOOD PRESSURE: 130 MMHG | DIASTOLIC BLOOD PRESSURE: 70 MMHG | HEART RATE: 62 BPM

## 2017-07-03 DIAGNOSIS — N18.30 CKD (CHRONIC KIDNEY DISEASE) STAGE 3, GFR 30-59 ML/MIN: ICD-10-CM

## 2017-07-03 DIAGNOSIS — I10 ESSENTIAL HYPERTENSION: Chronic | ICD-10-CM

## 2017-07-03 DIAGNOSIS — N18.30 CKD (CHRONIC KIDNEY DISEASE) STAGE 3, GFR 30-59 ML/MIN: Primary | ICD-10-CM

## 2017-07-03 LAB
ALBUMIN SERPL BCP-MCNC: 3 G/DL
ANION GAP SERPL CALC-SCNC: 7 MMOL/L
BUN SERPL-MCNC: 40 MG/DL
CALCIUM SERPL-MCNC: 8.8 MG/DL
CHLORIDE SERPL-SCNC: 115 MMOL/L
CO2 SERPL-SCNC: 21 MMOL/L
CREAT SERPL-MCNC: 2.1 MG/DL
EST. GFR  (AFRICAN AMERICAN): 26.5 ML/MIN/1.73 M^2
EST. GFR  (NON AFRICAN AMERICAN): 23 ML/MIN/1.73 M^2
GLUCOSE SERPL-MCNC: 75 MG/DL
PHOSPHATE SERPL-MCNC: 3.9 MG/DL
POTASSIUM SERPL-SCNC: 4.4 MMOL/L
SODIUM SERPL-SCNC: 143 MMOL/L

## 2017-07-03 PROCEDURE — 99999 PR PBB SHADOW E&M-EST. PATIENT-LVL IV: CPT | Mod: PBBFAC,,, | Performed by: INTERNAL MEDICINE

## 2017-07-03 PROCEDURE — 1126F AMNT PAIN NOTED NONE PRSNT: CPT | Mod: S$GLB,,, | Performed by: INTERNAL MEDICINE

## 2017-07-03 PROCEDURE — 1159F MED LIST DOCD IN RCRD: CPT | Mod: S$GLB,,, | Performed by: INTERNAL MEDICINE

## 2017-07-03 PROCEDURE — 99213 OFFICE O/P EST LOW 20 MIN: CPT | Mod: S$GLB,,, | Performed by: INTERNAL MEDICINE

## 2017-07-03 PROCEDURE — 99499 UNLISTED E&M SERVICE: CPT | Mod: S$GLB,,, | Performed by: INTERNAL MEDICINE

## 2017-07-03 RX ORDER — PSEUDOEPHEDRINE HCL 30 MG
500 TABLET ORAL 3 TIMES DAILY
Refills: 0 | COMMUNITY
Start: 2017-07-03 | End: 2017-07-03 | Stop reason: SDUPTHER

## 2017-07-03 RX ORDER — PSEUDOEPHEDRINE HCL 30 MG
750 TABLET ORAL 3 TIMES DAILY
Refills: 0 | Status: ON HOLD | COMMUNITY
Start: 2017-07-03 | End: 2018-04-16 | Stop reason: HOSPADM

## 2017-07-03 RX ORDER — SODIUM BICARBONATE 650 MG/1
650 TABLET ORAL 3 TIMES DAILY
Qty: 270 TABLET | Refills: 11 | Status: ON HOLD | COMMUNITY
Start: 2017-07-03 | End: 2018-09-24 | Stop reason: HOSPADM

## 2017-07-03 NOTE — TELEPHONE ENCOUNTER
----- Message from Rosario Matt sent at 7/3/2017  2:40 PM CDT -----  Contact: self   [phone]  Rose Mary   -     Pt was seen in clinic on 7/3, pt needs to speak with the Dr in regards to her medication   Thanks,

## 2017-07-03 NOTE — PATIENT INSTRUCTIONS
Drink plenty of water, make  about 2-2.5 L of urine daily    Add sodium bicarb 650 mg three times daily    Add calcium citrate 250 mg tab, 3 tabs 3 times daily  Stop calcium carbonate     low oxalate diet    Labs and urine in 2 weeks    rtc  6-8 weeks , rfp standing q 2 weeks

## 2017-07-03 NOTE — PROGRESS NOTES
Subjective:       Patient ID: Naty St is a 71 y.o. 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  She denies, LUTS, SOB, dysuria, hematuria, + peripheral edema now bilaterally, + 4  Since  Increase in serum crt patient has been receiving 1L NS infusion 2 x weekly.    Interval HX:  Serum creatinine has increased to 2.0-2.1 in the last several weeks.  Renal US  R lower pole non-obstructing stone, otherwise no acute findings   Serologies wnl  cpk wnl  Not on oral magnesium  UPRCT 0.11-.2  24 hr stone profile demonstrated hyperoxaluria 85 mg/dl and hypocitraturia, she claims she is following a low oxalate diet and not eating peanut butter,   She occassionally has watery diarrhea, pedal edema increases with dependency, Her bp at home is about 120/70.  No LUTs dysuria, fevers or SOB.But she is drinking less,and has had yellow stools for at least a month.  She has never started sodium bicarb      Review of Systems   Constitutional: Positive for fatigue and unexpected weight change. Negative for appetite change, chills and fever.        30 lb weight loss since 9/2016   HENT: Negative for congestion, facial swelling, hearing loss, nosebleeds and trouble swallowing.    Eyes: Negative for pain, discharge, redness and visual disturbance.   Respiratory: Negative for cough, chest tightness, shortness of breath and wheezing.    Cardiovascular: Positive for leg swelling. Negative for chest pain and palpitations.        Trace to +1 ankle edema, improved   Gastrointestinal: Negative for abdominal  "pain, constipation, diarrhea, nausea and vomiting.   Endocrine: Negative for cold intolerance, heat intolerance and polydipsia.   Genitourinary: Negative for decreased urine volume, difficulty urinating, dysuria, flank pain, hematuria and urgency.   Musculoskeletal: Negative for arthralgias, back pain, joint swelling and myalgias.   Skin: Negative for color change, pallor, rash and wound.   Neurological: Negative for dizziness, tremors, seizures, syncope, speech difficulty, weakness and headaches.   Hematological: Negative for adenopathy. Does not bruise/bleed easily.   Psychiatric/Behavioral: Negative for agitation, behavioral problems, dysphoric mood, self-injury and sleep disturbance.       Objective:     Blood pressure 130/70, pulse 62, height 5' 6" (1.676 m), weight 61 kg (134 lb 7.7 oz), SpO2 97 %.      Physical Exam   Constitutional: She is oriented to person, place, and time. She appears well-developed and well-nourished. No distress.   Chronically ill, swallow complexion, NAD, appearing more tired now   HENT:   Head: Normocephalic and atraumatic.   Mouth/Throat: No oropharyngeal exudate.   Eyes: Conjunctivae and EOM are normal. Pupils are equal, round, and reactive to light. Right eye exhibits no discharge. Left eye exhibits no discharge. No scleral icterus.   Neck: Normal range of motion. Neck supple. No JVD present. No tracheal deviation present. No thyromegaly present.   Cardiovascular: Normal rate, regular rhythm and normal heart sounds.  Exam reveals no gallop.    No murmur heard.  Trace to +1 bilateral nonpitting edema, unable to assess pulses   Pulmonary/Chest: Effort normal and breath sounds normal. No stridor. No respiratory distress. She has no wheezes. She has no rales. She exhibits no tenderness.   Abdominal: Soft. Bowel sounds are normal. She exhibits no distension and no mass. There is no tenderness. There is no rebound and no guarding. No hernia.   Bladder not palpable, just above umbilicus " 0.5 cm firm mobile mass that is more palpable when supine but not when sitting upright.   Musculoskeletal: She exhibits no edema or tenderness.   Lymphadenopathy:     She has no cervical adenopathy.   Neurological: She is alert and oriented to person, place, and time. She exhibits normal muscle tone.   Skin: Skin is warm and dry. No rash noted. She is not diaphoretic. No erythema.   Psychiatric: She has a normal mood and affect. Judgment and thought content normal.   Nursing note and vitals reviewed.      Assessment:       1. CKD (chronic kidney disease) stage 3, GFR 30-59 ml/min    2. 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     STEFAN superimposed on CKD. STEFAN may be related to use of Epidermal GF inhibitor   normal serologies, UPRCT with 100 to 250 mg protein, will repeat with next labs   Recent change in serum crt may reflect worsening ckd, pre-renal azotemia, increase PO fluid intake  Repeat labs in 2 weeks if worsening any further would obtain renal US  RFP, magnesium UA UPRCT in 2 weeks and monitor rfp, mg q 2 weeks for now      Diarrhea,  oral rehydration solution (recipe given as Pedialyte type formulas have K citrate which may make regulating serum k difficult to regulate with gfr  Of 25    Hypomagnesemia:  Likely related to Tarceva.  Patient is taking daily supplement  Will check level today.  Serum magnesium should be followed regularly while on tarceva.    CKD likely related to longstanding  HTN, nephrosclerosis with smoking history, possible reduced renal mass from scarring/  obstruction with nephrolithiasis and UTIs,  Stable function now.    Nephrolithiasis: hyperoxaluria, low oxalate diet, calcium citrate ordered, and d/c calcium carbonatewoul encourage Po fluid  2 L daily results pending    HTN: stable    Lab Results   Component Value Date    CREATININE 2.1 (H) 07/03/2017     Protein Creatinine Ratios: continue to follow  Prot/Creat Ratio, Ur   Date Value Ref Range Status   02/13/2017 0.24 (H) 0.00 - 0.20 Final   01/27/2017 0.11 0.00 - 0.20 Final       2. Metabolic Acidosis:  Begin  sodium bicarb 650 mg tid increase dose to 2 tabs tid  Lab Results   Component Value Date     07/03/2017    K 4.4 07/03/2017    CO2 21 (L) 07/03/2017         3. Renal osteodystrophy: PTH e;levated, rocaltrol tiw added  Lab Results   Component Value Date    .0 (H) 02/13/2017    CALCIUM 8.8 07/03/2017    PHOS 3.9 07/03/2017       4. Anemia:  As per hematology  Lab Results   Component Value Date    HGB 10.1 (L) 06/23/2017        5. HTN: stop amlodipine if bp remains below 110 systolic, consider echo if remains low      Medication: no change      Monitor BP as directed in instructions      7. Weight: Weight: 61 kg (134 lb 7.7 oz). she states that her daily weights   From reading flowsheets 30 lb weight loss since 9/2016  Hold on lasix at this time, despite peripheral edema she is  without respiratory symptoms.  Wt Readings from Last 3 Encounters:   07/03/17 61 kg (134 lb 7.7 oz)   06/05/17 61 kg (134 lb 8 oz)   05/26/17 61.2 kg (134 lb 14.7 oz)       8. Lipid management:   Lab Results   Component Value Date    LDLCALC 81.6 12/17/2015         9. Diet:  Education/referral: consider Nepro or BOOST      Sodium: 2 gm    Potassium:      Phosphorus:      Protein:      Fluid:       10. ESRD planing: not indicated at this time       Education:       Access:    DVT: on lovenox 1.5 mg/kg if gfr drops below 30 cc/min would reduce dose to 1mg/kg    13.  Please avoid or minimize all NSAIDS (ibuprofen, motrin,  aleve, indocin, naprosyn) to minimize the risk to your kidneys.      14. Follow up in :3 months

## 2017-07-03 NOTE — TELEPHONE ENCOUNTER
----- Message from Maria Delaney sent at 7/3/2017  1:52 PM CDT -----  Contact: pt's son  Pt's son Basil   [phone]  couldn't come with pt today      Mother is very confused  About the visit       Stage 4 cancer  Gets all upset     Please call son   With instructions etc from visit     Thanks     Spoke with pt and son regarding pt medication changes

## 2017-07-11 ENCOUNTER — OFFICE VISIT (OUTPATIENT)
Dept: UROGYNECOLOGY | Facility: CLINIC | Age: 72
End: 2017-07-11
Payer: MEDICARE

## 2017-07-11 VITALS
DIASTOLIC BLOOD PRESSURE: 60 MMHG | SYSTOLIC BLOOD PRESSURE: 120 MMHG | WEIGHT: 139.75 LBS | HEIGHT: 66 IN | BODY MASS INDEX: 22.46 KG/M2

## 2017-07-11 DIAGNOSIS — R39.15 URINARY URGENCY: Primary | ICD-10-CM

## 2017-07-11 DIAGNOSIS — Z85.3 HISTORY OF BREAST CANCER: ICD-10-CM

## 2017-07-11 DIAGNOSIS — C34.31 MALIGNANT NEOPLASM OF LOWER LOBE OF RIGHT LUNG: ICD-10-CM

## 2017-07-11 PROCEDURE — 81002 URINALYSIS NONAUTO W/O SCOPE: CPT | Mod: S$GLB,,, | Performed by: OBSTETRICS & GYNECOLOGY

## 2017-07-11 PROCEDURE — 1159F MED LIST DOCD IN RCRD: CPT | Mod: S$GLB,,, | Performed by: OBSTETRICS & GYNECOLOGY

## 2017-07-11 PROCEDURE — 99999 PR PBB SHADOW E&M-EST. PATIENT-LVL IV: CPT | Mod: PBBFAC,,, | Performed by: OBSTETRICS & GYNECOLOGY

## 2017-07-11 PROCEDURE — 1126F AMNT PAIN NOTED NONE PRSNT: CPT | Mod: S$GLB,,, | Performed by: OBSTETRICS & GYNECOLOGY

## 2017-07-11 PROCEDURE — 99214 OFFICE O/P EST MOD 30 MIN: CPT | Mod: S$GLB,,, | Performed by: OBSTETRICS & GYNECOLOGY

## 2017-07-11 NOTE — PROGRESS NOTES
Subjective:       Patient ID: Naty St is a 71 y.o. female.    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 She was Initially seen for concern of prolapse, not found to have significant prolapse. She was started on Mirabegron 25 mg which was increased to 50 mg.  She presents today for follow up. Some good  improvement in urinary symptoms.  She is concerned that the prolapse may have returned.      HPI   Interval  HP since the last visit   1)  UI:  (--) YOMI  (--) UUI  - at night if bladder too full.  (--) pads:.  Daytime frequency: Q 2-3  hours.  Nocturia: Yes: 2-3   (--) dysuria-  (--) hematuria,  (--) frequent UTIs.  (+) complete bladder emptying.     2)  POP:   Symptoms:(--)  .  (--) vaginal bleeding. (--) vaginal discharge. (-) sexually active.  (--) dyspareunia.   (--)  Vaginal dryness.  (--) vaginal estrogen use.  Using replens occasionally     3)  BM:  (+) constipation/straining.  (--) chronic diarrhea. (--) hematochezia.  (--) fecal incontinence.  (--) fecal smearing/urgency.  (--) incomplete evacuation.      4) Metastatic adenocarcinoma:      GYN & OB History  No LMP recorded (lmp unknown). Patient has had a hysterectomy.   Date of Last Pap: No result found    OB History    Para Term  AB Living   4 4           SAB TAB Ectopic Multiple Live Births                  # Outcome Date GA Lbr Elkin/2nd Weight Sex Delivery Anes PTL Lv   4 Para            3 Para            2 Para            1 Para                 Past Medical History:   Diagnosis Date    Blood clot in vein 2016    Breast cancer     Cataract     History of breast cancer     History of pancreatic cancer     Hx of psychiatric care     Hypertension     Pancreatic cancer      Posterior capsular opacification, left eye     Psychiatric problem     Seizures 01/25/2016    Therapy      Past Surgical History:   Procedure Laterality Date    BONE BIOSPY  3/9/16    BRAIN SURGERY  1/12/16    tumor removal 1/12/16    BREAST LUMPECTOMY  1998    left    BREAST LUMPECTOMY      left    CATARACT EXTRACTION Bilateral 2004    yag OU    CHOLECYSTECTOMY  1998    COLONOSCOPY N/A 11/13/2015    Procedure: COLONOSCOPY;  Surgeon: Alex Carmona MD;  Location: [facility] ([address] [place] [address]);  Service: Endoscopy;  Laterality: N/A;  2 year f/u    cysto and right ureteral stent  4/27/12    ecoli  2010    removal of blockage, done throat, then through the liver.    HYSTERECTOMY  1974    KIDNEY STONE SURGERY  2010--laser    left eswl  6/20/12    OOPHORECTOMY  4/25/2012    Laparoscopic BSO, lysis of adhesions, cystoscopy greater than 35mins     pancreatic cancer      whipple    WHIPPLE PROCEDURE W/ LAPAROSCOPY  1998         Current Outpatient Prescriptions:     amitriptyline (ELAVIL) 50 MG tablet, Take 1 tablet (50 mg total) by mouth every evening., Disp: 30 tablet, Rfl: 2    amlodipine (NORVASC) 5 MG tablet, Take 1 tablet (5 mg total) by mouth once daily., Disp: 30 tablet, Rfl: 11    atenolol (TENORMIN) 50 MG tablet, TAKE ONE TABLET BY MOUTH ONCE DAILY, Disp: 30 tablet, Rfl: 3    calcium citrate 250 mg calcium Tab, Take 750 mg by mouth 3 (three) times daily., Disp: , Rfl: 0    CREON CpDR, TAKE ONE CAPSULE BY MOUTH THREE TIMES A DAY WITH MEALS, Disp: 270 capsule, Rfl: 3    denosumab (XGEVA) 120 mg/1.7 mL (70 mg/mL) Soln, Inject 120 mg into the skin every 28 days., Disp: , Rfl:     dronabinol (MARINOL) 5 MG capsule, Take 1 capsule (5 mg total) by mouth 2 (two) times daily before meals., Disp: , Rfl:     ERGOCALCIFEROL, VITAMIN D2, (VITAMIN D ORAL), Take by mouth., Disp: , Rfl:     erlotinib (TARCEVA) 150 MG tablet, Take 1 tablet (150 mg total) by mouth once daily., Disp: 30 tablet, Rfl: 6     levetiracetam (KEPPRA) 750 MG Tab, Take 1 tablet (750 mg total) by mouth 2 (two) times daily., Disp: 60 tablet, Rfl: 3    lorazepam (ATIVAN) 1 MG tablet, TAKE ONE TABLET BY MOUTH TWICE DAILY, Disp: 60 tablet, Rfl: 2    mirabegron 50 mg Tb24, Take 1 tablet (50 mg total) by mouth once daily., Disp: 30 tablet, Rfl: 11    multivitamin (THERAGRAN) per tablet, Take 1 tablet by mouth once daily., Disp: , Rfl:     rivaroxaban (XARELTO) 20 mg Tab, Take 1 tablet (20 mg total) by mouth daily with dinner or evening meal., Disp: 30 tablet, Rfl: 11    sodium bicarbonate 650 MG tablet, Take 1 tablet (650 mg total) by mouth 3 (three) times daily. Increase dose to 2 60 mg tabs by mouth three times daily., Disp: 270 tablet, Rfl: 11    calcitRIOL (ROCALTROL) 0.25 MCG Cap, Take 1 capsule (0.25 mcg total) by mouth 3 (three) times a week., Disp: 36 capsule, Rfl: 0    ciprofloxacin HCl (CILOXAN) 0.3 % ophthalmic solution, , Disp: , Rfl:     clindamycin phosphate 1% (CLINDAGEL) 1 % gel, Apply topically 2 (two) times daily., Disp: 60 g, Rfl: 6  No current facility-administered medications for this visit.     Facility-Administered Medications Ordered in Other Visits:     denosumab (XGEVA) solution 120 mg, 120 mg, Subcutaneous, 1 time in Clinic/HOD, Karrie Vazquez MD    Review of Systems  Review of Systems   Constitutional: Negative.  Negative for activity change, appetite change, chills, diaphoresis, fatigue, fever and unexpected weight change.   HENT: Negative.    Eyes: Negative.    Respiratory: Negative.  Negative for cough.    Cardiovascular: Negative.    Gastrointestinal: Positive for constipation and diarrhea. Negative for abdominal distention, abdominal pain, anal bleeding, blood in stool, nausea, rectal pain and vomiting.   Endocrine: Negative.    Genitourinary: Positive for frequency. Negative for decreased urine volume, difficulty urinating, dyspareunia, enuresis, flank pain, genital sores, hematuria, menstrual problem,  pelvic pain, urgency, vaginal bleeding, vaginal discharge and vaginal pain.        Prolapse  + vaginal bulge   +vaginal pressure    Musculoskeletal: Negative for back pain.   Skin: Negative for color change, pallor, rash and wound.   Allergic/Immunologic: Negative for environmental allergies, food allergies and immunocompromised state.   Hematological: Negative for adenopathy. Does not bruise/bleed easily.   Psychiatric/Behavioral: Negative for agitation, behavioral problems, confusion and sleep disturbance.           Objective:     Physical Exam   Constitutional: She is oriented to person, place, and time. She appears well-developed.   HENT:   Head: Normocephalic and atraumatic.   Eyes: Conjunctivae and EOM are normal.   Neck: Normal range of motion. Neck supple.   Cardiovascular: Normal rate, regular rhythm, S1 normal, S2 normal, normal heart sounds and intact distal pulses.    Pulmonary/Chest: Effort normal and breath sounds normal. She exhibits no tenderness.   Abdominal: Soft. Bowel sounds are normal. She exhibits no distension and no mass. There is no hepatosplenomegaly, splenomegaly or hepatomegaly. There is no tenderness. There is no rigidity, no rebound, no guarding and no CVA tenderness. Hernia confirmed negative in the right inguinal area and confirmed negative in the left inguinal area.   Genitourinary: Pelvic exam was performed with patient supine. Rectum normal, vagina normal, skenes normal and bartholins normal. Right labia normal and left labia normal. Urethra exhibits hypermobility. Urethra exhibits no urethral caruncle, no urethral diverticulum and no urethral mass. Right bartholin is not enlarged and not tender. Left bartholin is not enlarged and not tender. Rectal exam shows resting tone normal and active tone normal. Rectal exam shows no external hemorrhoid, no fissure, no tenderness, anal tone normal and no dovetailing. Guaiac negative stool. There is atrophy in the vagina. No foreign body,  tenderness, bleeding, unspecified prolapse of vaginal walls, fistula, mesh exposure or lavator tenderness in the vagina. No vaginal discharge found. Right adnexum displays no tenderness. Left adnexum displays no tenderness. Cervix exhibits absence. Uterus is absent.   PVR: 25 ML  Empty cough stress test: Negative.  Kegel: 2/5    POP-Q  Aa: -2 Ba: -2 C: -5   GH: 3 PB: 2 TVL: 8   Ap: -1 Bp: -1 D:                      Musculoskeletal: Normal range of motion.   Lymphadenopathy:     She has no axillary adenopathy.        Right: No inguinal adenopathy present.        Left: No inguinal adenopathy present.   Neurological: She is alert and oriented to person, place, and time. She has normal strength and normal reflexes. Cranial nerves II through XII intact. No cranial nerve deficit.   Skin: Skin is warm, dry and intact.   Psychiatric: She has a normal mood and affect. Her speech is normal and behavior is normal. Judgment normal. Cognition and memory are normal.          Assessment:        1. Urinary urgency    2. Malignant neoplasm of lower lobe of right lung    3. History of breast cancer             Plan:      1. OAB improved  --Empty bladder every 3 hours.  Empty well: wait a minute, lean forward on toilet.    --Avoid dietary irritants (see sheet).  Keep diary x 3-5 days to determine your irritants.  --KEGELS: do 10 in AM and 10 in PM, holding each x 10 seconds.  When you feel urge to go, STOP, KEGEL, and when urge has passed, then go to bathroom.    --URGE: continue Myrbetriq to  50 mg daily.    2. Vaginal atrophy (dryness):   Use REPLENS or REFRESH OTC: 1/2 applicator full in vagina twice a week.           Approximately 30 min were spent in consult, 90 % in discussion.     Aide Carvalho DO  Female Pelvic Medicine and Reconstructive Surgery  [facility] [place], [place]

## 2017-07-19 ENCOUNTER — TELEPHONE (OUTPATIENT)
Dept: NEUROSURGERY | Facility: CLINIC | Age: 72
End: 2017-07-19

## 2017-07-19 DIAGNOSIS — R41.3 MEMORY CHANGES: ICD-10-CM

## 2017-07-19 DIAGNOSIS — C79.31 METASTATIC ADENOCARCINOMA TO BRAIN: Primary | ICD-10-CM

## 2017-07-19 NOTE — TELEPHONE ENCOUNTER
----- Message from Anabela Abbott sent at 7/18/2017  8:17 AM CDT -----  Contact: Patient [phone]  Patient is requesting a call back from ALIYA, patient states she is having a problem with her memory and would like to know if Dr. Ferrera can prescribe something for her. Please call

## 2017-07-19 NOTE — TELEPHONE ENCOUNTER
REFERRAL SENT TO DR ESTRADA TO SIGN FOR A REFERRAL TO NEUROLOGY FOR MEMORY ISSUES. WILL SCHED AFTER SIGNED. PT IS AWARE.

## 2017-07-20 ENCOUNTER — HOSPITAL ENCOUNTER (OUTPATIENT)
Dept: RADIOLOGY | Facility: HOSPITAL | Age: 72
Discharge: HOME | End: 2017-07-20
Attending: INTERNAL MEDICINE
Payer: MEDICARE

## 2017-07-20 ENCOUNTER — DOCUMENTATION ONLY (OUTPATIENT)
Dept: PSYCHIATRY | Facility: CLINIC | Age: 72
End: 2017-07-20

## 2017-07-20 DIAGNOSIS — C34.31 MALIGNANT NEOPLASM OF LOWER LOBE OF RIGHT LUNG: ICD-10-CM

## 2017-07-20 PROCEDURE — 78815 PET IMAGE W/CT SKULL-THIGH: CPT | Mod: 26,PS,, | Performed by: NUCLEAR MEDICINE

## 2017-07-21 ENCOUNTER — TELEPHONE (OUTPATIENT)
Dept: HEMATOLOGY/ONCOLOGY | Facility: CLINIC | Age: 72
End: 2017-07-21

## 2017-07-21 ENCOUNTER — INFUSION (OUTPATIENT)
Dept: INFUSION THERAPY | Facility: HOSPITAL | Age: 72
End: 2017-07-21
Attending: INTERNAL MEDICINE
Payer: MEDICARE

## 2017-07-21 ENCOUNTER — OFFICE VISIT (OUTPATIENT)
Dept: HEMATOLOGY/ONCOLOGY | Facility: CLINIC | Age: 72
End: 2017-07-21
Payer: MEDICARE

## 2017-07-21 ENCOUNTER — HOSPITAL ENCOUNTER (OUTPATIENT)
Dept: RADIOLOGY | Facility: HOSPITAL | Age: 72
Discharge: HOME | End: 2017-07-21
Attending: INTERNAL MEDICINE
Payer: MEDICARE

## 2017-07-21 VITALS
TEMPERATURE: 98 F | BODY MASS INDEX: 22.85 KG/M2 | HEART RATE: 60 BPM | WEIGHT: 142.19 LBS | HEIGHT: 66 IN | SYSTOLIC BLOOD PRESSURE: 139 MMHG | RESPIRATION RATE: 18 BRPM | DIASTOLIC BLOOD PRESSURE: 60 MMHG | OXYGEN SATURATION: 97 %

## 2017-07-21 DIAGNOSIS — R63.0 ANOREXIA: ICD-10-CM

## 2017-07-21 DIAGNOSIS — C34.31 MALIGNANT NEOPLASM OF LOWER LOBE OF RIGHT LUNG: Primary | ICD-10-CM

## 2017-07-21 DIAGNOSIS — I82.492 ACUTE EMBOLISM AND THROMBOSIS OF OTHER SPECIFIED DEEP VEIN OF LEFT LOWER EXTREMITY: ICD-10-CM

## 2017-07-21 DIAGNOSIS — C79.51 SECONDARY CANCER OF BONE: ICD-10-CM

## 2017-07-21 DIAGNOSIS — I82.403 ACUTE DEEP VEIN THROMBOSIS (DVT) OF BOTH LOWER EXTREMITIES, UNSPECIFIED VEIN: ICD-10-CM

## 2017-07-21 DIAGNOSIS — Z51.11 ENCOUNTER FOR ANTINEOPLASTIC CHEMOTHERAPY: ICD-10-CM

## 2017-07-21 PROCEDURE — 99999 PR PBB SHADOW E&M-EST. PATIENT-LVL V: CPT | Mod: PBBFAC,,, | Performed by: INTERNAL MEDICINE

## 2017-07-21 PROCEDURE — 1159F MED LIST DOCD IN RCRD: CPT | Mod: S$GLB,,, | Performed by: INTERNAL MEDICINE

## 2017-07-21 PROCEDURE — 96401 CHEMO ANTI-NEOPL SQ/IM: CPT

## 2017-07-21 PROCEDURE — 99215 OFFICE O/P EST HI 40 MIN: CPT | Mod: S$GLB,,, | Performed by: INTERNAL MEDICINE

## 2017-07-21 PROCEDURE — 63600175 PHARM REV CODE 636 W HCPCS: Performed by: INTERNAL MEDICINE

## 2017-07-21 PROCEDURE — 93970 EXTREMITY STUDY: CPT | Mod: 26,,, | Performed by: RADIOLOGY

## 2017-07-21 PROCEDURE — 1126F AMNT PAIN NOTED NONE PRSNT: CPT | Mod: S$GLB,,, | Performed by: INTERNAL MEDICINE

## 2017-07-21 PROCEDURE — 99499 UNLISTED E&M SERVICE: CPT | Mod: S$GLB,,, | Performed by: INTERNAL MEDICINE

## 2017-07-21 RX ORDER — DRONABINOL 5 MG/1
5 CAPSULE ORAL
Qty: 60 CAPSULE | Refills: 3 | Status: SHIPPED | OUTPATIENT
Start: 2017-07-21 | End: 2018-05-30 | Stop reason: SDUPTHER

## 2017-07-21 RX ADMIN — DENOSUMAB 120 MG: 120 INJECTION SUBCUTANEOUS at 01:07

## 2017-07-21 NOTE — TELEPHONE ENCOUNTER
----- Message from Clarisa Ferrera sent at 7/21/2017  2:55 PM CDT -----  Contact: Self   Pt son missed a phone call from you and would like a call back at your earliest convenience         Pt son (Basil Lopez) can be contacted at [phone]

## 2017-07-21 NOTE — TELEPHONE ENCOUNTER
----- Message from Mari Aparicio sent at 7/21/2017  3:21 PM CDT -----  Contact: Son  Please call the son, he and his mother are trying to understand today's visit.   Call Basil [phone]

## 2017-07-21 NOTE — TELEPHONE ENCOUNTER
"Returned call to patient's son, who is calling to speak with dr sullivan about his mom's PETscan, as he was not able to attend today's visit.   Son states mom is saying she has new "spots" on her PETscan. Son is calling to clarify, as nurse can only see scan is stable.    Basil's cell [phone]    Message routed to dr sullivan  "

## 2017-07-21 NOTE — NURSING
Pt arrived for xgeva following MD appt.  Labs reviewed.  Pt tolerated injection SQ to right arm.  Discharged to next appt with appt calendar.

## 2017-07-21 NOTE — TELEPHONE ENCOUNTER
----- Message from Poly Ramirez sent at 7/21/2017  2:44 PM CDT -----  Contact: Pt's son   Pt's son is calling to speak with nurse in regards to care.  Pt's son can be reached at [phone].    Thank you

## 2017-07-21 NOTE — PROGRESS NOTES
"Subjective:       Patient ID: Naty St is a 71 y.o. female.    Chief Complaint: Malignant neoplasm of lower lobe of right lung  Oncologic History:  Ms. Naty St was admitted to the hospital between 01/25/2016 and 01/28/2016. She has a history of pancreatic cancer 17 years ago, breast cancer and meningioma, presented to the hospital complaining of loss of consciousness. The patient apparently was in her normal state of health and she stood up to go to the bathroom and fell to the floor. The patient's daughter helped the mother up in the bathroom and noted that her mother's upper extremities were shaking and eye rolling. No reports of bowel or bladder incontinence, tongue biting or rolling. The second episode lasted about four minutes and she was extremely lethargic following that. Apparently, the patient had a meningioma resection done in the past; however, recently, underwent neurosurgical resection with Dr. 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    Additional immunohistochemical stains were performed which revealed the tumor cells to be are positive for TTF1 and negative for ER and GCDFP. The morphology and TTF1 positivity are most consistent with lung primary.    Of note, imaging scan at the end of January 2016 revealed a mass in the lung at 2 cm in the medial aspect of the apical segment of the right upper lobe abutting the mediastinum at the level of the azygous vein and abutting and possibly encasing the segmental bronchi and vessels of the apical segment of the right upper lobe and no pleural fluid was present. Also, there is an enlarged right paratracheal lymph node. No evidence of any metastatic disease at the pancreatic site with postoperative changes post Whipple disease  Her PET Scan from 2/15/16 reveal "Hypermetabolic mass in the right lung apex consistent with a " "primary malignancy. Hypermetabolic mediastinal lymph nodes consistent with metastatic disease. Right sacral hypermetabolic lesion consistent with metastatic disease, noting additional mildly sclerotic lesions in multiple vertebral bodies which do not demonstrate abnormal hypermetabolism  She underwent IR bone biopsy which revealed metastatic adenocarcinoma of lung origin. 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  She has completed focal RT to brain lesions in March 2016.    PET scan from 6/6/16 shows "Dramatic almost complete response to therapy."  Lovenox started after US lower ext 6/7/16 shows Acute complete occlusion of one of the left posterior tibial vein.Remote partial thrombus of the proximal left superficial femoral vein.  She is on Tarceva.   12/6/16 PET scan reveals "Stable right upper lobe lesion and right hilar lymph node. No new lesions identified. Trace left pleural effusion".  1/27/17 Renal u/s "Medical renal disease. Nonobstructive right nephrolithiasis"  1/31/17 PET - "Right upper lobe nodule and right hilar lymph node similar and very low grade activity.  There is no definite evidence of recurrence."          HPI Ms. St returns for follow up and to review her PET scan which reveals PET/CT findings compatible with relatively stable right upper lobe spiculated lung nodule with right hilar lymph node metastasis.  She feels well and denies any new complaints.    Review of Systems    Objective:      Physical Exam      LABS:  WBC   Date Value Ref Range Status   07/21/2017 4.38 3.90 - 12.70 K/uL Final     Hemoglobin   Date Value Ref Range Status   07/21/2017 9.5 (L) 12.0 - 16.0 g/dL Final     POC Hematocrit   Date Value Ref Range Status   01/12/2016 25 (L) 36 - 54 %PCV Final     Hematocrit   Date Value Ref Range Status   07/21/2017 30.2 (L) 37.0 - 48.5 % Final     Platelets   Date Value Ref Range Status   07/21/2017 256 150 - 350 K/uL Final     Gran #   Date Value Ref Range Status   07/21/2017 2.5 " 1.8 - 7.7 K/uL Final     Comment:     The ANC is based on a white cell differential from an   automated cell counter. It has not been microscopically   reviewed for the presence of abnormal cells. Clinical   correlation is required.         Chemistry        Component Value Date/Time     07/21/2017 1025     07/21/2017 1025    K 4.1 07/21/2017 1025    K 4.1 07/21/2017 1025     (H) 07/21/2017 1025     (H) 07/21/2017 1025    CO2 26 07/21/2017 1025    CO2 26 07/21/2017 1025    BUN 28 (H) 07/21/2017 1025    BUN 28 (H) 07/21/2017 1025    CREATININE 1.8 (H) 07/21/2017 1025    CREATININE 1.8 (H) 07/21/2017 1025     07/21/2017 1025     07/21/2017 1025        Component Value Date/Time    CALCIUM 8.7 07/21/2017 1025    CALCIUM 8.7 07/21/2017 1025    ALKPHOS 54 (L) 07/21/2017 1025    AST 24 07/21/2017 1025    ALT 13 07/21/2017 1025    BILITOT 1.4 (H) 07/21/2017 1025    ESTGFRAFRICA 32.0 (A) 07/21/2017 1025    ESTGFRAFRICA 32.0 (A) 07/21/2017 1025    EGFRNONAA 27.7 (A) 07/21/2017 1025    EGFRNONAA 27.7 (A) 07/21/2017 1025          Assessment:       1. Malignant neoplasm of lower lobe of right lung    2. Secondary cancer of bone    3. Encounter for antineoplastic chemotherapy    4. Anorexia    5. Acute deep vein thrombosis (DVT) of both lower extremities, unspecified vein    6. Acute embolism and thrombosis of other specified deep vein of left lower extremity         Plan:        1,2,3. She is doing well clinically and will continue with Tarceva and Xgeva. She will return in 4 weeks with labs for Xgeva and I will see her in 2 months with a PET scan. Her current PET scan reveals stable findings.  4. Refilled Marinol.  5. Obtain lower extremity ultrasound. She is currently on Xarelto. If new clot will switch to lovenox.      Above care plan was discussed with patient and all questions were addressed to her satisfaction

## 2017-07-21 NOTE — TELEPHONE ENCOUNTER
Spoke with patient's son.  Informed him patient's CT is stable.  Patient having US today to decide whether patient can stay on xarelto---or if new clot, switch to lovenox.  Patient's son voiced understanding.

## 2017-07-27 ENCOUNTER — TELEPHONE (OUTPATIENT)
Dept: PHARMACY | Facility: CLINIC | Age: 72
End: 2017-07-27

## 2017-07-31 ENCOUNTER — TELEPHONE (OUTPATIENT)
Dept: PHARMACY | Facility: CLINIC | Age: 72
End: 2017-07-31

## 2017-07-31 NOTE — TELEPHONE ENCOUNTER
Patient Follow-Up Call:     Spoke to patient about recent symptoms of minor nose bleeds and changes in sputum color.  Currently on Tarceva and Xarelto.      1. Minor nose bleeds: Minor nose bleeds have occurred when blowing her nose with small amounts of blood that resolve with pressure.  Instructed patient to continuing using Vasoline to keep nares moist and monitor if bleeding worsens are doesn't resolve after a few minutes of pressure.      2. Changing sputum color: Patient has a persistent cough and now has increasing green-colored sputum for the past 4-5 days.  Afebrile with no chills.  Instructed patient to consider seeing her PCP if symptoms worsen or remain unresolved. .      All questions answered to patients satisfaction.  Instructed patient to call [facility] with any other follow-up questions or concerns.

## 2017-08-01 ENCOUNTER — TELEPHONE (OUTPATIENT)
Dept: INTERNAL MEDICINE | Facility: CLINIC | Age: 72
End: 2017-08-01

## 2017-08-01 RX ORDER — METOPROLOL SUCCINATE 50 MG/1
50 TABLET, EXTENDED RELEASE ORAL DAILY
Qty: 30 TABLET | Refills: 11 | Status: ON HOLD | OUTPATIENT
Start: 2017-08-01 | End: 2018-04-16

## 2017-08-01 NOTE — TELEPHONE ENCOUNTER
----- Message from Suzie Martinez sent at 8/1/2017  2:18 PM CDT -----  Contact: Patricia with [facility]   [phone]  The Atenolol is on a nation wide back order and she's requesting a temporary medication to be called in for the patient.

## 2017-08-01 NOTE — TELEPHONE ENCOUNTER
There is a shortage of atenolol from the 's. Is there something else you can prescribe to pt?     Of note, I have heard [facility] on [address] has atenolol, I can see if the pt would like to travel to get her meds?     Please advise.

## 2017-08-01 NOTE — TELEPHONE ENCOUNTER
Spoke to Patricia at [facility] below med change. She understood. Has received new RX and will advise pt.

## 2017-08-03 ENCOUNTER — TELEPHONE (OUTPATIENT)
Dept: HEMATOLOGY/ONCOLOGY | Facility: CLINIC | Age: 72
End: 2017-08-03

## 2017-08-03 NOTE — TELEPHONE ENCOUNTER
----- Message from Ethan Cheatham sent at 8/3/2017  3:22 PM CDT -----  Contact: Patient's Son  Patient's Son called requesting a call from Dr. Vazquez or Nurse to discuss some concerns that he has about his mother. Please call patient at [phone].

## 2017-08-03 NOTE — TELEPHONE ENCOUNTER
Returned call to patient's son, who is calling to state his mom is experiencing productive (green) cough for a couple weeks now. Denies fever/chills/body aches.  Patient is scheduled to see PCP this coming Monday.   Son understands to bring patient to urgent care if her symptoms worsen.   Son thanked nurse.

## 2017-08-07 ENCOUNTER — OFFICE VISIT (OUTPATIENT)
Dept: INTERNAL MEDICINE | Facility: CLINIC | Age: 72
End: 2017-08-07
Payer: MEDICARE

## 2017-08-07 ENCOUNTER — TELEPHONE (OUTPATIENT)
Dept: INTERNAL MEDICINE | Facility: CLINIC | Age: 72
End: 2017-08-07

## 2017-08-07 VITALS
TEMPERATURE: 98 F | OXYGEN SATURATION: 97 % | HEIGHT: 61 IN | SYSTOLIC BLOOD PRESSURE: 124 MMHG | WEIGHT: 142.63 LBS | DIASTOLIC BLOOD PRESSURE: 76 MMHG | HEART RATE: 60 BPM | BODY MASS INDEX: 26.93 KG/M2

## 2017-08-07 DIAGNOSIS — I10 ESSENTIAL HYPERTENSION: Chronic | ICD-10-CM

## 2017-08-07 DIAGNOSIS — C34.31 MALIGNANT NEOPLASM OF LOWER LOBE OF RIGHT LUNG: ICD-10-CM

## 2017-08-07 DIAGNOSIS — R09.89 RUNNY NOSE: Primary | ICD-10-CM

## 2017-08-07 DIAGNOSIS — R23.4 CRACKED SKIN: ICD-10-CM

## 2017-08-07 PROCEDURE — 99499 UNLISTED E&M SERVICE: CPT | Mod: S$GLB,,, | Performed by: INTERNAL MEDICINE

## 2017-08-07 PROCEDURE — 1126F AMNT PAIN NOTED NONE PRSNT: CPT | Mod: S$GLB,,, | Performed by: INTERNAL MEDICINE

## 2017-08-07 PROCEDURE — 99214 OFFICE O/P EST MOD 30 MIN: CPT | Mod: S$GLB,,, | Performed by: INTERNAL MEDICINE

## 2017-08-07 PROCEDURE — 3008F BODY MASS INDEX DOCD: CPT | Mod: S$GLB,,, | Performed by: INTERNAL MEDICINE

## 2017-08-07 PROCEDURE — 99999 PR PBB SHADOW E&M-EST. PATIENT-LVL V: CPT | Mod: PBBFAC,,, | Performed by: INTERNAL MEDICINE

## 2017-08-07 PROCEDURE — 1159F MED LIST DOCD IN RCRD: CPT | Mod: S$GLB,,, | Performed by: INTERNAL MEDICINE

## 2017-08-07 NOTE — TELEPHONE ENCOUNTER
----- Message from Skye Sam sent at 8/7/2017  1:17 PM CDT -----  Contact: Patient [phone]  Patient picked up RX metoprolol succinate from the pharmacy and she has some questions.    Please call and advise.    Thank You

## 2017-08-07 NOTE — TELEPHONE ENCOUNTER
Spoke to pt, she had some trouble getting her medication at the pharmacy today. Wanted to confirm SIG for metoprolol.

## 2017-08-07 NOTE — PROGRESS NOTES
Subjective:       Patient ID: Naty St is a 71 y.o. female.    Chief Complaint: Nasal Congestion    History of present illness: Patient comes in to discuss runny nose for which previously she had some green mucus, now clear.  Also several of her fingers have some skin cracking.  She believes it relates to the oral medicines she is taking for lung chemotherapy.  She said she did note that the side effects can include this.  She has not spoken to her hematology oncologist about it.  She denies any GI or  complaints.  She still feels somewhat tired and weak.  No fever.  She did have some dry cracking skin around her nose but that has healed.  She wanted to know if she can use Eucerin lotion for her hands.  Several fingertips have minor cracks or cuts but none appear infected or bleeding.      Review of Systems   Constitutional: Positive for fatigue. Negative for fever.   HENT: Positive for postnasal drip and rhinorrhea.    Gastrointestinal: Negative for abdominal distention and abdominal pain.   Skin:        Cracking fingertips   Neurological: Positive for weakness.       Objective:      Physical Exam   Constitutional: She appears well-developed and well-nourished.   HENT:   Head: Normocephalic and atraumatic.   Right Ear: External ear normal.   Left Ear: External ear normal.   Nose: Nose normal.   Mouth/Throat: Oropharynx is clear and moist.   Cardiovascular: Intact distal pulses.    Abdominal: Soft. Bowel sounds are normal. There is no tenderness.   Skin:   Fingertips of several fingers of small cracks but no redness drainage or tenderness.       Assessment:       1. Runny nose    2. Cracked skin    3. Malignant neoplasm of lower lobe of right lung        Plan:       Naty was seen today for nasal congestion.    Diagnoses and all orders for this visit:    Runny nose    Cracked skin    Malignant neoplasm of lower lobe of right lung        Talked hematology oncology about possible medication side  effects.  May use Eucerin lotion.  Monitor for fever, rash, other side effects.  Clinically stable at this time

## 2017-08-08 RX ORDER — LORAZEPAM 1 MG/1
TABLET ORAL
Qty: 60 TABLET | Refills: 2 | Status: SHIPPED | OUTPATIENT
Start: 2017-08-08 | End: 2017-10-29 | Stop reason: SDUPTHER

## 2017-08-11 DIAGNOSIS — L27.0 ACNEIFORM DRUG ERUPTION: ICD-10-CM

## 2017-08-11 RX ORDER — CLINDAMYCIN PHOSPHATE 10 MG/G
GEL TOPICAL 2 TIMES DAILY
Qty: 60 G | Refills: 6 | Status: ON HOLD | OUTPATIENT
Start: 2017-08-11 | End: 2018-09-24 | Stop reason: HOSPADM

## 2017-08-15 ENCOUNTER — OFFICE VISIT (OUTPATIENT)
Dept: NEUROLOGY | Facility: CLINIC | Age: 72
End: 2017-08-15
Payer: MEDICARE

## 2017-08-15 VITALS
HEIGHT: 61 IN | WEIGHT: 140.63 LBS | SYSTOLIC BLOOD PRESSURE: 143 MMHG | BODY MASS INDEX: 26.55 KG/M2 | DIASTOLIC BLOOD PRESSURE: 68 MMHG | HEART RATE: 62 BPM

## 2017-08-15 DIAGNOSIS — R41.3 MEMORY LOSS: Primary | ICD-10-CM

## 2017-08-15 PROCEDURE — 3077F SYST BP >= 140 MM HG: CPT | Mod: S$GLB,,, | Performed by: NURSE PRACTITIONER

## 2017-08-15 PROCEDURE — 99999 PR PBB SHADOW E&M-EST. PATIENT-LVL IV: CPT | Mod: PBBFAC,,, | Performed by: NURSE PRACTITIONER

## 2017-08-15 PROCEDURE — 3008F BODY MASS INDEX DOCD: CPT | Mod: S$GLB,,, | Performed by: NURSE PRACTITIONER

## 2017-08-15 PROCEDURE — 1126F AMNT PAIN NOTED NONE PRSNT: CPT | Mod: S$GLB,,, | Performed by: NURSE PRACTITIONER

## 2017-08-15 PROCEDURE — 99215 OFFICE O/P EST HI 40 MIN: CPT | Mod: S$GLB,,, | Performed by: NURSE PRACTITIONER

## 2017-08-15 PROCEDURE — 3078F DIAST BP <80 MM HG: CPT | Mod: S$GLB,,, | Performed by: NURSE PRACTITIONER

## 2017-08-15 PROCEDURE — 1159F MED LIST DOCD IN RCRD: CPT | Mod: S$GLB,,, | Performed by: NURSE PRACTITIONER

## 2017-08-15 NOTE — PROGRESS NOTES
Name: Naty St  MRN: [id number]   CSN: [id number]      Date: 08/15/2017    Referring physician:  Salty Ferrera MD  [address] [place] [address]    Chief Complaint / Interval History: need medication for memory    History of Present Illness (HPI):    70 yo female presents alone for evaluation of memory at the request of Dr. Ferrera. She is not remembering like she once did. States she can be in the middle of conversation and lose her thought. Noted this at least this year, poss last year. It was actually when she started feeling better that she noted her memory was not as good. She had breast cancer 1993 and pancreatic cancer in 1998, then diagnosed with lung cancer in Feb 2016 with mets to brain and R hip. States Dr. Ferrera removed the cancer in the brain. She is under treatment for cancer now.   She has not had chemo recently  other than tarsiva.  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  She is independent with all ADLs.    started cooking last year when she got sick.   She manages finances. Sometimes bothers her but kristoferwas did- alywat hated doing this. She soemtimes has trouble with math part and will have to double check- not new.   She does have some treobel making a decision.  She states she is not in remission but in control.     From Dr. Ferrera's last office visit 6-5-17...  Progress Notes        This office note has been dictated.  Ntay St returned in neurosurgical followup to the office today.  She has   had no new difficulties over the past three months.  She has had no seizures.    She continues to note that her balance is not as steady, but she has taken no   falls.  She feels her energy level is basically stable.  She has not regained   the weight she lost, but has not lost more weight either.  She continues to be   followed by Dr. Vazquez in Oncology and apparently PET scans have been stable.     On examination today, she is speaking clearly.  Her incision remains well    healed.  Eye movements are good.  She has normal speech and shows no focal   deficits.     MRI of the brain was done at [facility] today and compared to her preop   study of 12/23/15.  The large left frontal tumor has been completely excised.    The brain has reexpanded.  There is no evidence for recurrent tumor and no   findings to suggest any new metastasis.  Contrast was not used in today's study.     The brain seems to be stable.  I will plan to have her return in about six   months with a followup scan.  This will be two years from operation.             ROS:  Hyposmia (HENT)?No  RBD/sleep issues (Constitutional)?No  Depression/anxiety (Psychiatric)?Yes- sometimes has depression and anxiety-does not feel this way at present  Fatigue (Constitutional)?Yes- sometimes but not the norm  Constipation (GI)?No  Urinary issues ()?Yes- overactive bladder  Orthostasis (Cardiovascular)?No  Falls (Musculoskeletal)?Yes- about  4 over the past year- last fall was about one month ago- 3AM in the dark  Cognitive impairment (Neurologic)?Yes  Psychoses (Psychiatric)?No  Pain/Paresthesia (Neurologic)?No  Visual changes (Eyes)?Yes- bilateral cataract removal  Moles / skin changes (Skin)?Yes- occ break out from Tarsiva  Stridor / SOB (Pulm)?No  Bruising (Heme)?No    Past Medical History: The patient  has a past medical history of Blood clot in vein (06/2016); Breast cancer (1994); Cataract; History of breast cancer; History of pancreatic cancer; psychiatric care; Hypertension; Pancreatic cancer (1998); Posterior capsular opacification, left eye; Psychiatric problem; Seizures (01/25/2016); and Therapy.    Social History: The patient  reports that she quit smoking about 55 years ago. She has a 0.12 pack-year smoking history. She has never used smokeless tobacco. She reports that she does not drink alcohol or use drugs.    Family History: Their family history includes Breast cancer in her mother; Leukemia in her paternal  grandfather; Lung cancer in her mother; Stomach cancer in her paternal grandmother.    Allergies: Tobradex [tobramycin-dexamethasone]; Iodinated contrast- oral and iv dye; Morphine; Latex; and Phenytoin sodium extended     Meds:   Current Outpatient Prescriptions on File Prior to Visit   Medication Sig Dispense Refill    amitriptyline (ELAVIL) 50 MG tablet Take 1 tablet (50 mg total) by mouth every evening. 30 tablet 2    amlodipine (NORVASC) 5 MG tablet Take 1 tablet (5 mg total) by mouth once daily. 30 tablet 11    calcium citrate 250 mg calcium Tab Take 750 mg by mouth 3 (three) times daily.  0    ciprofloxacin HCl (CILOXAN) 0.3 % ophthalmic solution       clindamycin phosphate 1% (CLINDAGEL) 1 % gel Apply topically 2 (two) times daily. 60 g 6    CREON CpDR TAKE ONE CAPSULE BY MOUTH THREE TIMES A DAY WITH MEALS 270 capsule 3    denosumab (XGEVA) 120 mg/1.7 mL (70 mg/mL) Soln Inject 120 mg into the skin every 28 days.      dronabinol (MARINOL) 5 MG capsule Take 1 capsule (5 mg total) by mouth 2 (two) times daily before meals. 60 capsule 3    ERGOCALCIFEROL, VITAMIN D2, (VITAMIN D ORAL) Take by mouth.      erlotinib (TARCEVA) 150 MG tablet Take 1 tablet (150 mg total) by mouth once daily. 30 tablet 6    levetiracetam (KEPPRA) 750 MG Tab Take 1 tablet (750 mg total) by mouth 2 (two) times daily. 60 tablet 3    lorazepam (ATIVAN) 1 MG tablet TAKE ONE TABLET BY MOUTH TWICE DAILY 60 tablet 2    magnesium chloride (SLOW-MAG) 71.5 mg TbEC Take 72 mg by mouth 3 (three) times a week.      metoprolol succinate (TOPROL-XL) 50 MG 24 hr tablet Take 1 tablet (50 mg total) by mouth once daily. 30 tablet 11    mirabegron 50 mg Tb24 Take 1 tablet (50 mg total) by mouth once daily. 30 tablet 11    multivitamin (THERAGRAN) per tablet Take 1 tablet by mouth once daily.      rivaroxaban (XARELTO) 20 mg Tab Take 1 tablet (20 mg total) by mouth daily with dinner or evening meal. 30 tablet 11    sodium bicarbonate 650  "MG tablet Take 1 tablet (650 mg total) by mouth 3 (three) times daily. Increase dose to 2 60 mg tabs by mouth three times daily. 270 tablet 11     Current Facility-Administered Medications on File Prior to Visit   Medication Dose Route Frequency Provider Last Rate Last Dose    denosumab (XGEVA) solution 120 mg  120 mg Subcutaneous 1 time in Clinic/HOD Karrie Vazquez MD           Exam:  LMP  (LMP Unknown)     Constitutional  Well-developed, well-nourished, appears stated age   Ophthalmoscopic  No papilledema with no hemorrhages or exudates bilaterally   Cardiovascular  Radial pulses 2+ and symmetric, no LE edema bilaterally   Neurological    * Mental status  MOCA = 23/30     - Orientation  Oriented to person, place, time, and situation     - Memory   Immediate 3/5 and 5/5     - Attention/concentration  Loses 3 of 6 points      - Language  Animal naming intact. Loses 1 of 2 points for sentence repetition intact. Names 9 "f" words in one minute (11 or > is normal).      - Fund of knowledge  Aware of current events     - Executive Loses 2 of 5 points.      - Abstract  Intact   * Cranial nerves       - CN II  PERRL, visual fields full to confrontation     - CN III, IV, VI  Extraocular movements full, normal pursuits and saccades     - CN V  Sensation V1 - V3 intact     - CN VII  Face strong and symmetric bilaterally     - CN VIII  Hearing intact bilaterally     - CN IX, X  Palate raises midline and symmetric     - CN XI  SCM and trapezius 5/5 bilaterally     - CN XII  Tongue midline   * Coordination  No dysmetria with finger-to-nose                  Laboratory/Radiological:  - Results:  Lab Visit on 07/21/2017   Component Date Value Ref Range Status    WBC 07/21/2017 4.38  3.90 - 12.70 K/uL Final    RBC 07/21/2017 3.25* 4.00 - 5.40 M/uL Final    Hemoglobin 07/21/2017 9.5* 12.0 - 16.0 g/dL Final    Hematocrit 07/21/2017 30.2* 37.0 - 48.5 % Final    MCV 07/21/2017 93  82 - 98 fL Final    MCH 07/21/2017 29.2  27.0 - " 31.0 pg Final    MCHC 07/21/2017 31.5* 32.0 - 36.0 g/dL Final    RDW 07/21/2017 13.1  11.5 - 14.5 % Final    Platelets 07/21/2017 256  150 - 350 K/uL Final    MPV 07/21/2017 9.3  9.2 - 12.9 fL Final    Gran # 07/21/2017 2.5  1.8 - 7.7 K/uL Final    Sodium 07/21/2017 144  136 - 145 mmol/L Final    Potassium 07/21/2017 4.1  3.5 - 5.1 mmol/L Final    Chloride 07/21/2017 112* 95 - 110 mmol/L Final    CO2 07/21/2017 26  23 - 29 mmol/L Final    Glucose 07/21/2017 102  70 - 110 mg/dL Final    BUN, Bld 07/21/2017 28* 8 - 23 mg/dL Final    Creatinine 07/21/2017 1.8* 0.5 - 1.4 mg/dL Final    Calcium 07/21/2017 8.7  8.7 - 10.5 mg/dL Final    Total Protein 07/21/2017 5.7* 6.0 - 8.4 g/dL Final    Albumin 07/21/2017 2.9* 3.5 - 5.2 g/dL Final    Total Bilirubin 07/21/2017 1.4* 0.1 - 1.0 mg/dL Final    Alkaline Phosphatase 07/21/2017 54* 55 - 135 U/L Final    AST 07/21/2017 24  10 - 40 U/L Final    ALT 07/21/2017 13  10 - 44 U/L Final    Anion Gap 07/21/2017 6* 8 - 16 mmol/L Final    eGFR if  07/21/2017 32.0* >60 mL/min/1.73 m^2 Final    eGFR if non  07/21/2017 27.7* >60 mL/min/1.73 m^2 Final    Glucose 07/21/2017 102  70 - 110 mg/dL Final    Sodium 07/21/2017 144  136 - 145 mmol/L Final    Potassium 07/21/2017 4.1  3.5 - 5.1 mmol/L Final    Chloride 07/21/2017 112* 95 - 110 mmol/L Final    CO2 07/21/2017 26  23 - 29 mmol/L Final    BUN, Bld 07/21/2017 28* 8 - 23 mg/dL Final    Calcium 07/21/2017 8.7  8.7 - 10.5 mg/dL Final    Creatinine 07/21/2017 1.8* 0.5 - 1.4 mg/dL Final    Albumin 07/21/2017 2.9* 3.5 - 5.2 g/dL Final    Phosphorus 07/21/2017 3.0  2.7 - 4.5 mg/dL Final    eGFR if  07/21/2017 32.0* >60 mL/min/1.73 m^2 Final    eGFR if non  07/21/2017 27.7* >60 mL/min/1.73 m^2 Final    Anion Gap 07/21/2017 6* 8 - 16 mmol/L Final    Magnesium 07/21/2017 1.6  1.6 - 2.6 mg/dL Final   Office Visit on 07/11/2017   Component Date Value  Ref Range Status    Color, UA 07/11/2017 yel   Final    Spec Grav UA 07/11/2017 1.010   Final    pH, UA 07/11/2017 5   Final    WBC, UA 07/11/2017 +   Final    Nitrite, UA 07/11/2017 n   Final    Protein 07/11/2017 n   Final    Glucose, UA 07/11/2017 n   Final    Ketones, UA 07/11/2017 n   Final    Urobilinogen, UA 07/11/2017 n   Final    Bilirubin 07/11/2017 n   Final    Blood, UA 07/11/2017 n   Final   Lab Visit on 07/03/2017   Component Date Value Ref Range Status    Specimen UA 07/03/2017 Urine, Unspecified   Final    Color, UA 07/03/2017 Yellow  Yellow, Straw, Patricia Final    Appearance, UA 07/03/2017 Clear  Clear Final    pH, UA 07/03/2017 5.0  5.0 - 8.0 Final    Specific Gravity, UA 07/03/2017 1.010  1.005 - 1.030 Final    Protein, UA 07/03/2017 Negative  Negative Final    Glucose, UA 07/03/2017 Negative  Negative Final    Ketones, UA 07/03/2017 Negative  Negative Final    Bilirubin (UA) 07/03/2017 Negative  Negative Final    Occult Blood UA 07/03/2017 Negative  Negative Final    Nitrite, UA 07/03/2017 Negative  Negative Final    Urobilinogen, UA 07/03/2017 Negative  <2.0 EU/dL Final    Leukocytes, UA 07/03/2017 3+* Negative Final    Protein, Urine Random 07/03/2017 <7  0 - 15 mg/dL Final    Creatinine, Random Ur 07/03/2017 54.0  15.0 - 325.0 mg/dL Final    Prot/Creat Ratio, Ur 07/03/2017 Unable to calculate  0.00 - 0.20 Final    WBC, UA 07/03/2017 2  0 - 5 /hpf Final    Squam Epithel, UA 07/03/2017 2  /hpf Final    Microscopic Comment 07/03/2017 SEE COMMENT   Final   Lab Visit on 07/03/2017   Component Date Value Ref Range Status    Glucose 07/03/2017 75  70 - 110 mg/dL Final    Sodium 07/03/2017 143  136 - 145 mmol/L Final    Potassium 07/03/2017 4.4  3.5 - 5.1 mmol/L Final    Chloride 07/03/2017 115* 95 - 110 mmol/L Final    CO2 07/03/2017 21* 23 - 29 mmol/L Final    BUN, Bld 07/03/2017 40* 8 - 23 mg/dL Final    Calcium 07/03/2017 8.8  8.7 - 10.5 mg/dL Final     Creatinine 07/03/2017 2.1* 0.5 - 1.4 mg/dL Final    Albumin 07/03/2017 3.0* 3.5 - 5.2 g/dL Final    Phosphorus 07/03/2017 3.9  2.7 - 4.5 mg/dL Final    eGFR if  07/03/2017 26.5* >60 mL/min/1.73 m^2 Final    eGFR if non African American 07/03/2017 23.0* >60 mL/min/1.73 m^2 Final    Anion Gap 07/03/2017 7* 8 - 16 mmol/L Final   Lab Visit on 06/23/2017   Component Date Value Ref Range Status    WBC 06/23/2017 5.32  3.90 - 12.70 K/uL Final    RBC 06/23/2017 3.44* 4.00 - 5.40 M/uL Final    Hemoglobin 06/23/2017 10.1* 12.0 - 16.0 g/dL Final    Hematocrit 06/23/2017 31.6* 37.0 - 48.5 % Final    MCV 06/23/2017 92  82 - 98 fL Final    MCH 06/23/2017 29.4  27.0 - 31.0 pg Final    MCHC 06/23/2017 32.0  32.0 - 36.0 % Final    RDW 06/23/2017 13.6  11.5 - 14.5 % Final    Platelets 06/23/2017 244  150 - 350 K/uL Final    MPV 06/23/2017 9.3  9.2 - 12.9 fL Final    Gran # 06/23/2017 3.3  1.8 - 7.7 K/uL Final    Sodium 06/23/2017 140  136 - 145 mmol/L Final    Potassium 06/23/2017 4.5  3.5 - 5.1 mmol/L Final    Chloride 06/23/2017 118* 95 - 110 mmol/L Final    CO2 06/23/2017 16* 23 - 29 mmol/L Final    Glucose 06/23/2017 79  70 - 110 mg/dL Final    BUN, Bld 06/23/2017 39* 8 - 23 mg/dL Final    Creatinine 06/23/2017 2.0* 0.5 - 1.4 mg/dL Final    Calcium 06/23/2017 8.7  8.7 - 10.5 mg/dL Final    Total Protein 06/23/2017 6.3  6.0 - 8.4 g/dL Final    Albumin 06/23/2017 3.2* 3.5 - 5.2 g/dL Final    Total Bilirubin 06/23/2017 1.1* 0.1 - 1.0 mg/dL Final    Alkaline Phosphatase 06/23/2017 50* 55 - 135 U/L Final    AST 06/23/2017 23  10 - 40 U/L Final    ALT 06/23/2017 14  10 - 44 U/L Final    Anion Gap 06/23/2017 6* 8 - 16 mmol/L Final    eGFR if  06/23/2017 28.1* >60 mL/min/1.73 m^2 Final    eGFR if non African American 06/23/2017 24.4* >60 mL/min/1.73 m^2 Final   Lab Visit on 05/26/2017   Component Date Value Ref Range Status    WBC 05/26/2017 3.42* 3.90 - 12.70 K/uL Final     RBC 05/26/2017 3.53* 4.00 - 5.40 M/uL Final    Hemoglobin 05/26/2017 10.3* 12.0 - 16.0 g/dL Final    Hematocrit 05/26/2017 32.4* 37.0 - 48.5 % Final    MCV 05/26/2017 92  82 - 98 fL Final    MCH 05/26/2017 29.2  27.0 - 31.0 pg Final    MCHC 05/26/2017 31.8* 32.0 - 36.0 % Final    RDW 05/26/2017 12.6  11.5 - 14.5 % Final    Platelets 05/26/2017 237  150 - 350 K/uL Final    MPV 05/26/2017 9.9  9.2 - 12.9 fL Final    Gran # 05/26/2017 1.6* 1.8 - 7.7 K/uL Final    Sodium 05/26/2017 140  136 - 145 mmol/L Final    Potassium 05/26/2017 4.1  3.5 - 5.1 mmol/L Final    Chloride 05/26/2017 113* 95 - 110 mmol/L Final    CO2 05/26/2017 20* 23 - 29 mmol/L Final    Glucose 05/26/2017 87  70 - 110 mg/dL Final    BUN, Bld 05/26/2017 27* 8 - 23 mg/dL Final    Creatinine 05/26/2017 1.8* 0.5 - 1.4 mg/dL Final    Calcium 05/26/2017 8.9  8.7 - 10.5 mg/dL Final    Total Protein 05/26/2017 6.5  6.0 - 8.4 g/dL Final    Albumin 05/26/2017 3.4* 3.5 - 5.2 g/dL Final    Total Bilirubin 05/26/2017 0.8  0.1 - 1.0 mg/dL Final    Alkaline Phosphatase 05/26/2017 53* 55 - 135 U/L Final    AST 05/26/2017 25  10 - 40 U/L Final    ALT 05/26/2017 16  10 - 44 U/L Final    Anion Gap 05/26/2017 7* 8 - 16 mmol/L Final    eGFR if  05/26/2017 32.0* >60 mL/min/1.73 m^2 Final    eGFR if non  05/26/2017 27.7* >60 mL/min/1.73 m^2 Final       - Independent review of images:            Impression         Post operative changes from prior right frontoparietal craniotomy for left frontal lobe metastatic tumor resection.  Evaluation for residual or recurrent lesions is limited by lack of IV contrast.  However, no evidence of edema to suspect new lesions.    Sequela of chronic microvascular ischemia changes.  Sinus disease, as above.          ______________________________________     Electronically signed by resident: NOMI DIAMOND MD  Date: 06/05/17       Diagnoses:          Memory Loss   -likely  multifactorial- does have extensive hx of cancer, including brain mets (post surgery)    Medical Decision Making:  Discussed various causes of memory loss.   Discussed typical memory work-up. She wants to proceed with labs and neuropsychological evaluation.   Hold on MRI brain as has scheduled in November.   Follow up 2-3 weeks after NP eval.   She asks about meds. Will hold until work-up complete.     I spent 50 minutes face-to-face with the patient with >50% of the time spent with counseling and education regarding:  - results of data, diagnosis, and recommendations stated above  - the prognosis of memory loss  - rationale of plan  - importance of diet and exercise    Samina Rojas, PADMINI, NP-C  Division of Movement and Memory Disorders  [facility]  [phone]

## 2017-08-15 NOTE — LETTER
August 18, 2017      Salty Ferrera MD  [address]  [place] [address]           [facility]  [address]  [facility] [address]  Phone: [phone]  Fax: [phone]          Patient: Naty St   MR Number: [id number]   YOB: 1945   Date of Visit: 8/15/2017       Dear Dr. Salty Ferrera:    Thank you for referring Naty St to me for evaluation. Attached you will find relevant portions of my assessment and plan of care.    If you have questions, please do not hesitate to call me. I look forward to following Naty St along with you.    Sincerely,        Enclosure  CC:  No Recipients    If you would like to receive this communication electronically, please contact [email or web address] or [phone] to request more information on [employer] Link access.    For providers and/or their staff who would like to refer a patient to [facility], please contact us through our one-stop-shop provider referral line, [facility], at [phone].    If you feel you have received this communication in error or would no longer like to receive these types of communications, please e-mail [email or web address]

## 2017-08-15 NOTE — PATIENT INSTRUCTIONS
I have ordered labs looking for reversible memory loss. You can have these drawn Friday when you have the blood work obtained.     I have also ordered neuropsychological evaluation (formal memory testing). They will call to schedule this. It may be a couple of months before you hear from them.     I will see you back in the office 2-3 weeks after the formal test is complete.

## 2017-08-18 ENCOUNTER — TELEPHONE (OUTPATIENT)
Dept: HEMATOLOGY/ONCOLOGY | Facility: CLINIC | Age: 72
End: 2017-08-18

## 2017-08-18 ENCOUNTER — INFUSION (OUTPATIENT)
Dept: INFUSION THERAPY | Facility: HOSPITAL | Age: 72
End: 2017-08-18
Attending: INTERNAL MEDICINE
Payer: MEDICARE

## 2017-08-18 DIAGNOSIS — C34.31 MALIGNANT NEOPLASM OF LOWER LOBE OF RIGHT LUNG: Primary | ICD-10-CM

## 2017-08-18 PROCEDURE — 96401 CHEMO ANTI-NEOPL SQ/IM: CPT

## 2017-08-18 PROCEDURE — 63600175 PHARM REV CODE 636 W HCPCS: Performed by: INTERNAL MEDICINE

## 2017-08-18 RX ADMIN — DENOSUMAB 120 MG: 120 INJECTION SUBCUTANEOUS at 12:08

## 2017-08-18 NOTE — TELEPHONE ENCOUNTER
PJ's recommendation changed----informed son to have patient remain on calcium citrate 250mg tid and to add vit d3 800u/day---we will see if she starts absorbing calcium better with addition of vit D--and will recheck CMP in one month (as already scheduled).  Son voiced understanding.;

## 2017-08-18 NOTE — TELEPHONE ENCOUNTER
Returned call to patient's son, who is calling to speak with dr sullivan about patient's calcium level and whether patient needs to adjust what she is taking right now.  Patient's corrected calcium today is 9.1. She received her xgeva today.  Patient is taking calcium citrate 250mg tid (as per nephrologist's recommendation), and son is wondering if this is sufficient for her at this point in time. Nurse informed son she would ask ABRAHAN to contact him back to discuss. Son thanked nurse.    Message routed to abrahan

## 2017-08-18 NOTE — NURSING
Pt arrived for xgeva.  Labs reviewed and corrected calcium is 9.  Pt tolerated injection to right arm.  Discharged to home.  Pt states she is taking calcium at home and no jaw pain or dental issues.

## 2017-08-18 NOTE — TELEPHONE ENCOUNTER
----- Message from Janna Kay sent at 8/18/2017  1:12 PM CDT -----  Contact: Son  Follow up regarding patient injection, and labs.    Call [phone] ext [phone]

## 2017-08-29 ENCOUNTER — TELEPHONE (OUTPATIENT)
Dept: PHARMACY | Facility: CLINIC | Age: 72
End: 2017-08-29

## 2017-08-30 DIAGNOSIS — E83.42 HYPOMAGNESEMIA: ICD-10-CM

## 2017-08-30 DIAGNOSIS — E83.51 HYPOCALCEMIA: ICD-10-CM

## 2017-08-30 DIAGNOSIS — N18.30 CKD (CHRONIC KIDNEY DISEASE) STAGE 3, GFR 30-59 ML/MIN: Primary | ICD-10-CM

## 2017-08-30 NOTE — PROGRESS NOTES
At the son Basil's request,  have called him  3 times in the last several weeks, always leaving messages. I finally reached him today. He is concerned( and confused), about the patient's calcium the medications she is on for calcium ( but he is unsure of medicines at this time),and communication between doctors.  Calcium is 8.1, last magnesium 1.6  CO2 21, crt 1.7Patient is on calcium citrate, denusomab, vit d.  I will order serum rfp, ionized calcium PTH, vit d level and magnesium to be completed with next set of labs   and then make recommendations.  Patient should be seen in the office sooner than scheduled 12 weeks if labs are abnormal and son is confused about instructions and medications.

## 2017-08-31 ENCOUNTER — TELEPHONE (OUTPATIENT)
Dept: SURGERY | Facility: CLINIC | Age: 72
End: 2017-08-31

## 2017-08-31 NOTE — TELEPHONE ENCOUNTER
Return call to pt. States she realized after call was placed, that she did need to talk to Dr Platt. Pt was calling to discuss breast augmentation, which Dr Platt does not do, the plastic surgery team does this procedure. Pt has a hx of pancreatic, breast and lung cancer. Per pt she feels that she should not have this surgery the more she thinks about it as she has been told by Dr Vazquez that her  Lung cancer is controlled, but not in remission. Offered pt to come to the [facility] if she wanted, to get some knitted knockers. Pt states she has an appt 9/11/17 and will drop by. Informed pt to ask for the nurse navigator Jennifer.

## 2017-08-31 NOTE — TELEPHONE ENCOUNTER
----- Message from Jd Lee sent at 8/31/2017 12:07 PM CDT -----  Patient states that (s)he needs to speak with nurse in ref to some questions she has for the MD/please call back at [phone]//thank you

## 2017-09-01 ENCOUNTER — TELEPHONE (OUTPATIENT)
Dept: NEPHROLOGY | Facility: CLINIC | Age: 72
End: 2017-09-01

## 2017-09-01 ENCOUNTER — PATIENT MESSAGE (OUTPATIENT)
Dept: NEUROLOGY | Facility: CLINIC | Age: 72
End: 2017-09-01

## 2017-09-11 ENCOUNTER — HOSPITAL ENCOUNTER (OUTPATIENT)
Dept: RADIOLOGY | Facility: HOSPITAL | Age: 72
Discharge: HOME | End: 2017-09-11
Attending: INTERNAL MEDICINE
Payer: MEDICARE

## 2017-09-11 DIAGNOSIS — C34.31 MALIGNANT NEOPLASM OF LOWER LOBE OF RIGHT LUNG: ICD-10-CM

## 2017-09-11 PROBLEM — N39.0 URINARY TRACT INFECTION WITHOUT HEMATURIA: Status: RESOLVED | Noted: 2017-02-19 | Resolved: 2017-09-11

## 2017-09-11 PROCEDURE — A9552 F18 FDG: HCPCS

## 2017-09-11 PROCEDURE — 78815 PET IMAGE W/CT SKULL-THIGH: CPT | Mod: 26,PS,, | Performed by: RADIOLOGY

## 2017-09-14 DIAGNOSIS — C79.31 SECONDARY ADENOCARCINOMA OF BRAIN: ICD-10-CM

## 2017-09-15 ENCOUNTER — LAB VISIT (OUTPATIENT)
Dept: LAB | Facility: HOSPITAL | Age: 72
End: 2017-09-15
Attending: INTERNAL MEDICINE
Payer: MEDICARE

## 2017-09-15 ENCOUNTER — OFFICE VISIT (OUTPATIENT)
Dept: HEMATOLOGY/ONCOLOGY | Facility: CLINIC | Age: 72
End: 2017-09-15
Payer: MEDICARE

## 2017-09-15 ENCOUNTER — INFUSION (OUTPATIENT)
Dept: INFUSION THERAPY | Facility: HOSPITAL | Age: 72
End: 2017-09-15
Attending: INTERNAL MEDICINE
Payer: MEDICARE

## 2017-09-15 VITALS
HEART RATE: 62 BPM | OXYGEN SATURATION: 99 % | WEIGHT: 138.88 LBS | RESPIRATION RATE: 18 BRPM | BODY MASS INDEX: 22.32 KG/M2 | DIASTOLIC BLOOD PRESSURE: 64 MMHG | TEMPERATURE: 98 F | HEIGHT: 66 IN | SYSTOLIC BLOOD PRESSURE: 144 MMHG

## 2017-09-15 DIAGNOSIS — N18.30 CKD (CHRONIC KIDNEY DISEASE) STAGE 3, GFR 30-59 ML/MIN: ICD-10-CM

## 2017-09-15 DIAGNOSIS — I10 ESSENTIAL HYPERTENSION: Chronic | ICD-10-CM

## 2017-09-15 DIAGNOSIS — C79.51 SECONDARY CANCER OF BONE: ICD-10-CM

## 2017-09-15 DIAGNOSIS — E83.51 HYPOCALCEMIA: ICD-10-CM

## 2017-09-15 DIAGNOSIS — E83.42 HYPOMAGNESEMIA: ICD-10-CM

## 2017-09-15 DIAGNOSIS — C34.31 MALIGNANT NEOPLASM OF LOWER LOBE OF RIGHT LUNG: ICD-10-CM

## 2017-09-15 DIAGNOSIS — C34.31 MALIGNANT NEOPLASM OF LOWER LOBE OF RIGHT LUNG: Primary | ICD-10-CM

## 2017-09-15 DIAGNOSIS — C79.31 SECONDARY ADENOCARCINOMA OF BRAIN: ICD-10-CM

## 2017-09-15 DIAGNOSIS — R53.81 PHYSICAL DECONDITIONING: ICD-10-CM

## 2017-09-15 LAB
25(OH)D3+25(OH)D2 SERPL-MCNC: 16 NG/ML
25(OH)D3+25(OH)D2 SERPL-MCNC: 16 NG/ML
ALBUMIN SERPL BCP-MCNC: 2.7 G/DL
ALP SERPL-CCNC: 59 U/L
ALP SERPL-CCNC: 59 U/L
ALT SERPL W/O P-5'-P-CCNC: 19 U/L
ALT SERPL W/O P-5'-P-CCNC: 19 U/L
ANION GAP SERPL CALC-SCNC: 5 MMOL/L
ANION GAP SERPL CALC-SCNC: 5 MMOL/L
AST SERPL-CCNC: 23 U/L
AST SERPL-CCNC: 23 U/L
BILIRUB DIRECT SERPL-MCNC: 0.5 MG/DL
BILIRUB SERPL-MCNC: 1.2 MG/DL
BILIRUB SERPL-MCNC: 1.2 MG/DL
BUN SERPL-MCNC: 27 MG/DL
BUN SERPL-MCNC: 27 MG/DL
CA-I BLDV-SCNC: 1.25 MMOL/L
CALCIUM SERPL-MCNC: 8.5 MG/DL
CALCIUM SERPL-MCNC: 8.5 MG/DL
CHLORIDE SERPL-SCNC: 115 MMOL/L
CHLORIDE SERPL-SCNC: 115 MMOL/L
CO2 SERPL-SCNC: 23 MMOL/L
CO2 SERPL-SCNC: 23 MMOL/L
CREAT SERPL-MCNC: 1.6 MG/DL
CREAT SERPL-MCNC: 1.6 MG/DL
ERYTHROCYTE [DISTWIDTH] IN BLOOD BY AUTOMATED COUNT: 13.9 %
EST. GFR  (AFRICAN AMERICAN): 36.6 ML/MIN/1.73 M^2
EST. GFR  (AFRICAN AMERICAN): 36.6 ML/MIN/1.73 M^2
EST. GFR  (NON AFRICAN AMERICAN): 31.7 ML/MIN/1.73 M^2
EST. GFR  (NON AFRICAN AMERICAN): 31.7 ML/MIN/1.73 M^2
GLUCOSE SERPL-MCNC: 75 MG/DL
GLUCOSE SERPL-MCNC: 75 MG/DL
HCT VFR BLD AUTO: 31.5 %
HGB BLD-MCNC: 10 G/DL
MAGNESIUM SERPL-MCNC: 1.6 MG/DL
MCH RBC QN AUTO: 28.6 PG
MCHC RBC AUTO-ENTMCNC: 31.7 G/DL
MCV RBC AUTO: 90 FL
NEUTROPHILS # BLD AUTO: 3.4 K/UL
PHOSPHATE SERPL-MCNC: 2.6 MG/DL
PLATELET # BLD AUTO: 255 K/UL
PMV BLD AUTO: 9.6 FL
POTASSIUM SERPL-SCNC: 3.4 MMOL/L
POTASSIUM SERPL-SCNC: 3.4 MMOL/L
PROT SERPL-MCNC: 5.9 G/DL
PROT SERPL-MCNC: 5.9 G/DL
PTH-INTACT SERPL-MCNC: 170 PG/ML
RBC # BLD AUTO: 3.5 M/UL
SODIUM SERPL-SCNC: 143 MMOL/L
SODIUM SERPL-SCNC: 143 MMOL/L
WBC # BLD AUTO: 5.4 K/UL

## 2017-09-15 PROCEDURE — 36415 COLL VENOUS BLD VENIPUNCTURE: CPT

## 2017-09-15 PROCEDURE — 82306 VITAMIN D 25 HYDROXY: CPT

## 2017-09-15 PROCEDURE — 63600175 PHARM REV CODE 636 W HCPCS: Performed by: INTERNAL MEDICINE

## 2017-09-15 PROCEDURE — 99215 OFFICE O/P EST HI 40 MIN: CPT | Mod: S$GLB,,, | Performed by: INTERNAL MEDICINE

## 2017-09-15 PROCEDURE — 1126F AMNT PAIN NOTED NONE PRSNT: CPT | Mod: S$GLB,,, | Performed by: INTERNAL MEDICINE

## 2017-09-15 PROCEDURE — 82330 ASSAY OF CALCIUM: CPT

## 2017-09-15 PROCEDURE — 99999 PR PBB SHADOW E&M-EST. PATIENT-LVL IV: CPT | Mod: PBBFAC,,, | Performed by: INTERNAL MEDICINE

## 2017-09-15 PROCEDURE — 83735 ASSAY OF MAGNESIUM: CPT

## 2017-09-15 PROCEDURE — 3078F DIAST BP <80 MM HG: CPT | Mod: S$GLB,,, | Performed by: INTERNAL MEDICINE

## 2017-09-15 PROCEDURE — 80053 COMPREHEN METABOLIC PANEL: CPT

## 2017-09-15 PROCEDURE — 99499 UNLISTED E&M SERVICE: CPT | Mod: S$GLB,,, | Performed by: INTERNAL MEDICINE

## 2017-09-15 PROCEDURE — 83970 ASSAY OF PARATHORMONE: CPT

## 2017-09-15 PROCEDURE — 1159F MED LIST DOCD IN RCRD: CPT | Mod: S$GLB,,, | Performed by: INTERNAL MEDICINE

## 2017-09-15 PROCEDURE — 84100 ASSAY OF PHOSPHORUS: CPT

## 2017-09-15 PROCEDURE — 3077F SYST BP >= 140 MM HG: CPT | Mod: S$GLB,,, | Performed by: INTERNAL MEDICINE

## 2017-09-15 PROCEDURE — 96401 CHEMO ANTI-NEOPL SQ/IM: CPT

## 2017-09-15 PROCEDURE — 85027 COMPLETE CBC AUTOMATED: CPT

## 2017-09-15 PROCEDURE — 84075 ASSAY ALKALINE PHOSPHATASE: CPT

## 2017-09-15 PROCEDURE — 3008F BODY MASS INDEX DOCD: CPT | Mod: S$GLB,,, | Performed by: INTERNAL MEDICINE

## 2017-09-15 RX ADMIN — DENOSUMAB 120 MG: 120 INJECTION SUBCUTANEOUS at 12:09

## 2017-09-15 NOTE — Clinical Note
Schedule CMP and Xgeva in 4 weeks  Schedule CBC,CMP and PET scan and see me in 8 weeks and for Xgeva

## 2017-09-15 NOTE — NURSING
Pt arrived for xgeva following MD appt.  Labs reviewed with pt.  Pt tolerated injection SQ to right arm.  Discharged to home with .

## 2017-09-15 NOTE — PROGRESS NOTES
"Subjective:       Patient ID: Naty St is a 72 y.o. female.    Chief Complaint: Malignant neoplasm of lower lobe of right lung; increase in dry cough; and Fatigue  Oncologic History:  Ms. Naty St was admitted to the hospital between 01/25/2016 and 01/28/2016. She has a history of pancreatic cancer 17 years ago, breast cancer and meningioma, presented to the hospital complaining of loss of consciousness. The patient apparently was in her normal state of health and she stood up to go to the bathroom and fell to the floor. The patient's daughter helped the mother up in the bathroom and noted that her mother's upper extremities were shaking and eye rolling. No reports of bowel or bladder incontinence, tongue biting or rolling. The second episode lasted about four minutes and she was extremely lethargic following that. Apparently, the patient had a meningioma resection done in the past; however, recently, underwent neurosurgical resection with Dr. 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    Additional immunohistochemical stains were performed which revealed the tumor cells to be are positive for TTF1 and negative for ER and GCDFP. The morphology and TTF1 positivity are most consistent with lung primary.    Of note, imaging scan at the end of January 2016 revealed a mass in the lung at 2 cm in the medial aspect of the apical segment of the right upper lobe abutting the mediastinum at the level of the azygous vein and abutting and possibly encasing the segmental bronchi and vessels of the apical segment of the right upper lobe and no pleural fluid was present. Also, there is an enlarged right paratracheal lymph node. No evidence of any metastatic disease at the pancreatic site with postoperative changes post Whipple disease  Her PET Scan from 2/15/16 reveal "Hypermetabolic mass in the " "right lung apex consistent with a primary malignancy. Hypermetabolic mediastinal lymph nodes consistent with metastatic disease. Right sacral hypermetabolic lesion consistent with metastatic disease, noting additional mildly sclerotic lesions in multiple vertebral bodies which do not demonstrate abnormal hypermetabolism  She underwent IR bone biopsy which revealed metastatic adenocarcinoma of lung origin. 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  She has completed focal RT to brain lesions in March 2016.    PET scan from 6/6/16 shows "Dramatic almost complete response to therapy."  Lovenox started after US lower ext 6/7/16 shows Acute complete occlusion of one of the left posterior tibial vein.Remote partial thrombus of the proximal left superficial femoral vein.  She is on Tarceva.   12/6/16 PET scan reveals "Stable right upper lobe lesion and right hilar lymph node. No new lesions identified. Trace left pleural effusion".  1/27/17 Renal u/s "Medical renal disease. Nonobstructive right nephrolithiasis"  1/31/17 PET - "Right upper lobe nodule and right hilar lymph node similar and very low grade activity.  There is no definite evidence of recurrence."             HPI Ms. St returns for follow up and to review her PET scan "Essentially unchanged burden of disease with hypermetabolic right upper lobe/perihilar spiculated mass and right hilar ayad metastasis". She will continue on tarceva.    Review of Systems   Constitutional: Negative for appetite change, fatigue and unexpected weight change.   HENT: Negative for mouth sores.    Eyes: Negative for visual disturbance.   Respiratory: Negative for cough and shortness of breath.    Cardiovascular: Negative for chest pain.   Gastrointestinal: Negative for abdominal pain and diarrhea.   Genitourinary: Negative for frequency.   Musculoskeletal: Negative for back pain.   Skin: Negative for rash.   Neurological: Negative for headaches.   Hematological: Negative for " adenopathy.   Psychiatric/Behavioral: The patient is not nervous/anxious.    All other systems reviewed and are negative.      PMFSH: all information reviewed and updated as relevant to today's visit  Objective:      Physical Exam   Constitutional: She is oriented to person, place, and time. She appears well-developed and well-nourished.   HENT:   Mouth/Throat: No oropharyngeal exudate.   Cardiovascular: Normal rate and normal heart sounds.    Pulmonary/Chest: Effort normal and breath sounds normal. She has no wheezes.   Abdominal: Soft. Bowel sounds are normal. There is no tenderness.   Musculoskeletal: She exhibits no edema or tenderness.   Lymphadenopathy:     She has no cervical adenopathy.   Neurological: She is alert and oriented to person, place, and time. Coordination normal.   Skin: Skin is warm and dry. No rash noted.   Psychiatric: She has a normal mood and affect. Judgment and thought content normal.   Vitals reviewed.      LABS:  WBC   Date Value Ref Range Status   09/15/2017 5.40 3.90 - 12.70 K/uL Final     Hemoglobin   Date Value Ref Range Status   09/15/2017 10.0 (L) 12.0 - 16.0 g/dL Final     POC Hematocrit   Date Value Ref Range Status   01/12/2016 25 (L) 36 - 54 %PCV Final     Hematocrit   Date Value Ref Range Status   09/15/2017 31.5 (L) 37.0 - 48.5 % Final     Platelets   Date Value Ref Range Status   09/15/2017 255 150 - 350 K/uL Final     Gran #   Date Value Ref Range Status   09/15/2017 3.4 1.8 - 7.7 K/uL Final     Comment:     The ANC is based on a white cell differential from an   automated cell counter. It has not been microscopically   reviewed for the presence of abnormal cells. Clinical   correlation is required.         Chemistry        Component Value Date/Time     09/15/2017 1017     09/15/2017 1017    K 3.4 (L) 09/15/2017 1017    K 3.4 (L) 09/15/2017 1017     (H) 09/15/2017 1017     (H) 09/15/2017 1017    CO2 23 09/15/2017 1017    CO2 23 09/15/2017 1017     BUN 27 (H) 09/15/2017 1017    BUN 27 (H) 09/15/2017 1017    CREATININE 1.6 (H) 09/15/2017 1017    CREATININE 1.6 (H) 09/15/2017 1017    GLU 75 09/15/2017 1017    GLU 75 09/15/2017 1017        Component Value Date/Time    CALCIUM 8.5 (L) 09/15/2017 1017    CALCIUM 8.5 (L) 09/15/2017 1017    ALKPHOS 59 09/15/2017 1017    ALKPHOS 59 09/15/2017 1017    AST 23 09/15/2017 1017    AST 23 09/15/2017 1017    ALT 19 09/15/2017 1017    ALT 19 09/15/2017 1017    BILITOT 1.2 (H) 09/15/2017 1017    BILITOT 1.2 (H) 09/15/2017 1017    ESTGFRAFRICA 36.6 (A) 09/15/2017 1017    ESTGFRAFRICA 36.6 (A) 09/15/2017 1017    EGFRNONAA 31.7 (A) 09/15/2017 1017    EGFRNONAA 31.7 (A) 09/15/2017 1017          Assessment:       1. Malignant neoplasm of lower lobe of right lung    2. Secondary adenocarcinoma of brain    3. Secondary cancer of bone    4. CKD (chronic kidney disease) stage 3, GFR 30-59 ml/min    5. Hypocalcemia    6. Physical deconditioning        Plan:         1,2,3. She is doing well. Her PET scan continues to show stable findings. She will continue on Tarceva and will return in 4 weeks for xgeva and in 8 weeks for PET scan. She will also proceed with Xgeva today. MRI brain is due in November 2017, being ordered by Dr. Ferrera in Neurosurgery.  4. Stable. She follows with Nephrology  5. Recommended compliance with calcium supplementation.  6. Also advised her to increase her activity.    Above care plan was discussed with patient and accompanying son and  and all questions were addressed to their satisfaction

## 2017-09-18 RX ORDER — LEVETIRACETAM 750 MG/1
750 TABLET ORAL 2 TIMES DAILY
Qty: 60 TABLET | Refills: 3 | Status: SHIPPED | OUTPATIENT
Start: 2017-09-18 | End: 2018-01-31 | Stop reason: SDUPTHER

## 2017-09-26 ENCOUNTER — PATIENT MESSAGE (OUTPATIENT)
Dept: NEUROLOGY | Facility: CLINIC | Age: 72
End: 2017-09-26

## 2017-09-28 ENCOUNTER — OFFICE VISIT (OUTPATIENT)
Dept: NEPHROLOGY | Facility: CLINIC | Age: 72
End: 2017-09-28
Payer: MEDICARE

## 2017-09-28 VITALS
WEIGHT: 136 LBS | DIASTOLIC BLOOD PRESSURE: 80 MMHG | HEART RATE: 62 BPM | BODY MASS INDEX: 21.86 KG/M2 | OXYGEN SATURATION: 97 % | SYSTOLIC BLOOD PRESSURE: 140 MMHG | HEIGHT: 66 IN

## 2017-09-28 DIAGNOSIS — E87.20 METABOLIC ACIDOSIS: ICD-10-CM

## 2017-09-28 DIAGNOSIS — N18.30 ANEMIA OF CHRONIC RENAL FAILURE, STAGE 3 (MODERATE): ICD-10-CM

## 2017-09-28 DIAGNOSIS — I10 ESSENTIAL HYPERTENSION: Chronic | ICD-10-CM

## 2017-09-28 DIAGNOSIS — E83.42 HYPOMAGNESEMIA: ICD-10-CM

## 2017-09-28 DIAGNOSIS — N18.30 CKD (CHRONIC KIDNEY DISEASE) STAGE 3, GFR 30-59 ML/MIN: Primary | ICD-10-CM

## 2017-09-28 DIAGNOSIS — E83.51 HYPOCALCEMIA: ICD-10-CM

## 2017-09-28 DIAGNOSIS — R60.0 PERIPHERAL EDEMA: ICD-10-CM

## 2017-09-28 DIAGNOSIS — D63.1 ANEMIA OF CHRONIC RENAL FAILURE, STAGE 3 (MODERATE): ICD-10-CM

## 2017-09-28 DIAGNOSIS — E55.9 VITAMIN D DEFICIENCY: ICD-10-CM

## 2017-09-28 PROBLEM — N18.9 ANEMIA OF CHRONIC RENAL FAILURE: Status: ACTIVE | Noted: 2017-09-28

## 2017-09-28 PROCEDURE — 1126F AMNT PAIN NOTED NONE PRSNT: CPT | Mod: S$GLB,,, | Performed by: INTERNAL MEDICINE

## 2017-09-28 PROCEDURE — 1159F MED LIST DOCD IN RCRD: CPT | Mod: S$GLB,,, | Performed by: INTERNAL MEDICINE

## 2017-09-28 PROCEDURE — 99499 UNLISTED E&M SERVICE: CPT | Mod: S$GLB,,, | Performed by: INTERNAL MEDICINE

## 2017-09-28 PROCEDURE — 99213 OFFICE O/P EST LOW 20 MIN: CPT | Mod: S$GLB,,, | Performed by: INTERNAL MEDICINE

## 2017-09-28 PROCEDURE — 99999 PR PBB SHADOW E&M-EST. PATIENT-LVL III: CPT | Mod: PBBFAC,,, | Performed by: INTERNAL MEDICINE

## 2017-09-28 PROCEDURE — 3008F BODY MASS INDEX DOCD: CPT | Mod: S$GLB,,, | Performed by: INTERNAL MEDICINE

## 2017-09-28 PROCEDURE — 3077F SYST BP >= 140 MM HG: CPT | Mod: S$GLB,,, | Performed by: INTERNAL MEDICINE

## 2017-09-28 PROCEDURE — 3079F DIAST BP 80-89 MM HG: CPT | Mod: S$GLB,,, | Performed by: INTERNAL MEDICINE

## 2017-09-28 RX ORDER — ERGOCALCIFEROL 1.25 MG/1
50000 CAPSULE ORAL
Qty: 8 CAPSULE | Refills: 0 | Status: SHIPPED | OUTPATIENT
Start: 2017-09-28 | End: 2017-11-28

## 2017-09-28 NOTE — PATIENT INSTRUCTIONS
Standing labs every month rfp, mg, UA    novasource or a nondairy protein drink supplement    Or use benaprotein powder 1 daily on food    Vit d [id number] u once weekly for 8 weeks then once a month thereafter  Stop vit d3  Daily dose      rtc 3 mo

## 2017-09-28 NOTE — PROGRESS NOTES
Subjective:       Patient ID: Naty St is a 72 y.o. 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  She denies, LUTS, SOB, dysuria, hematuria, + peripheral edema now bilaterally, + 4  Since  Increase in serum crt patient has been receiving 1L NS infusion 2 x weekly.    Interval HX:  Serum creatinine stable at 1.6-1.7  Renal US  R lower pole non-obstructing stone, otherwise no acute findings   Serologies wnl  cpk wnl  Not on oral magnesium checked was 1.6 serum potassium is 3.4 currently  UPRCT none found on this exam  24 hr stone profile demonstrated hyperoxaluria 85 mg/dl and hypocitraturia, she claims she is following a low oxalate diet and not eating peanut butter,   She occassionally has watery diarrhea, pedal edema increases with dependency, Her bp at home is about 120/70.  No LUTs dysuria, fevers or SOB.But she is drinking less,and has had yellow stools for at least a month.  Now taking sodium bicarbonate serum CO2 23      Review of Systems   Constitutional: Positive for fatigue and unexpected weight change. Negative for appetite change, chills and fever.        30 lb weight loss since 9/2016   HENT: Negative for congestion, facial swelling, hearing loss, nosebleeds and trouble swallowing.    Eyes: Negative for pain, discharge, redness and visual disturbance.   Respiratory: Negative for cough, chest tightness, shortness of breath and wheezing.    Cardiovascular: Positive for leg swelling. Negative for chest pain and palpitations.        Trace to +1 ankle edema, improved  "  Gastrointestinal: Negative for abdominal pain, constipation, diarrhea, nausea and vomiting.   Endocrine: Negative for cold intolerance, heat intolerance and polydipsia.   Genitourinary: Negative for decreased urine volume, difficulty urinating, dysuria, flank pain, hematuria and urgency.   Musculoskeletal: Negative for arthralgias, back pain, joint swelling and myalgias.   Skin: Negative for color change, pallor, rash and wound.   Neurological: Negative for dizziness, tremors, seizures, syncope, speech difficulty, weakness and headaches.   Hematological: Negative for adenopathy. Does not bruise/bleed easily.   Psychiatric/Behavioral: Negative for agitation, behavioral problems, dysphoric mood, self-injury and sleep disturbance.       Objective:     Blood pressure (!) 140/80, pulse 62, height 5' 6" (1.676 m), weight 61.7 kg (136 lb), SpO2 97 %.      Physical Exam   Constitutional: She is oriented to person, place, and time. She appears well-developed and well-nourished. No distress.   Chronically ill, swallow complexion, NAD, appearing more tired now   HENT:   Head: Normocephalic and atraumatic.   Mouth/Throat: No oropharyngeal exudate.   Eyes: Conjunctivae and EOM are normal. Pupils are equal, round, and reactive to light. Right eye exhibits no discharge. Left eye exhibits no discharge. No scleral icterus.   Neck: Normal range of motion. Neck supple. No JVD present. No tracheal deviation present. No thyromegaly present.   Cardiovascular: Normal rate, regular rhythm and normal heart sounds.  Exam reveals no gallop.    No murmur heard.  Trace to +1 bilateral nonpitting edema, unable to assess pulses   Pulmonary/Chest: Effort normal and breath sounds normal. No stridor. No respiratory distress. She has no wheezes. She has no rales. She exhibits no tenderness.   Abdominal: Soft. Bowel sounds are normal. She exhibits no distension and no mass. There is no tenderness. There is no rebound and no guarding. No hernia. "   Bladder not palpable, just above umbilicus 0.5 cm firm mobile mass that is more palpable when supine but not when sitting upright.   Musculoskeletal: She exhibits no edema or tenderness.   Lymphadenopathy:     She has no cervical adenopathy.   Neurological: She is alert and oriented to person, place, and time. She exhibits normal muscle tone.   Skin: Skin is warm and dry. No rash noted. She is not diaphoretic. No erythema.   Psychiatric: She has a normal mood and affect. Judgment and thought content normal.   Nursing note and vitals reviewed.      Assessment:       1. CKD (chronic kidney disease) stage 3, GFR 30-59 ml/min    2. Vitamin D deficiency    3. Hypocalcemia    4. Hypomagnesemia    5. Metabolic acidosis    6. Essential hypertension    7. 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     STEFAN superimposed on CKD. STEFAN may be related to use of Epidermal GF inhibitor, dehydration.  Now resolved   normal serologies, UPRCT with 100 to 250 mg protein,      Diarrhea,  resolved    Hypomagnesemia:  Likely related to Tarceva.  Patient is taking daily supplement  Will check level today.  Serum magnesium should be followed regularly while on tarceva.  Low normal serum K we'll check potassium with next magnesium level in 2 weeks    CKD likely related to longstanding  HTN, nephrosclerosis with smoking history, possible reduced renal mass from scarring/ obstruction with nephrolithiasis and UTIs,  Stable function now.    Nephrolithiasis: hyperoxaluria, low oxalate diet, calcium citrate ordered, and d/c  calcium carbonatewoul encourage Po fluid  2 L daily     HTN: stable    Peripheral edema: Improved    Lab Results   Component Value Date    CREATININE 1.6 (H) 09/15/2017    CREATININE 1.6 (H) 09/15/2017     Protein Creatinine Ratios: continue to follow  Prot/Creat Ratio, Ur   Date Value Ref Range Status   07/03/2017 Unable to calculate 0.00 - 0.20 Final   02/13/2017 0.24 (H) 0.00 - 0.20 Final   01/27/2017 0.11 0.00 - 0.20 Final       2. Metabolic Acidosis:  sodium bicarb 650 mg tid increase dose to 2 tabs tid check serum magnesium with hypokalemia  Lab Results   Component Value Date     09/15/2017     09/15/2017    K 3.4 (L) 09/15/2017    K 3.4 (L) 09/15/2017    CO2 23 09/15/2017    CO2 23 09/15/2017         3. Renal osteodystrophy: PTH elevated, vitamin D. 16 as well as low phosphorus   treat vitamin D before adding Rocaltrol   25-hydroxy vitamin D 50,000  thousand units weekly ×8 weeks and then once monthly thereafter  Lab Results   Component Value Date    .0 (H) 09/15/2017    CALCIUM 8.5 (L) 09/15/2017    CALCIUM 8.5 (L) 09/15/2017    CAION 1.25 09/15/2017    PHOS 2.6 (L) 09/15/2017       4. Anemia:  As per hematology we'll order iron level  Lab Results   Component Value Date    HGB 10.0 (L) 09/15/2017        5. HTN: stop amlodipine if bp remains below 110 systolic, consider echo if remains low      Medication: no change      Monitor BP as directed in instructions      7. Weight: Weight: 61.7 kg (136 lb). she states that her daily weights   From reading flowsheets 30 lb weight loss since 9/2016  Hold on lasix at this time, despite peripheral edema she is  without respiratory symptoms.  Wt Readings from Last 3 Encounters:   09/28/17 61.7 kg (136 lb)   09/15/17 63 kg (138 lb 14.2 oz)   08/15/17 63.8 kg (140 lb 10.5 oz)       8. Lipid management:   Lab Results   Component Value Date    LDLCALC 81.6 12/17/2015         9. Diet:  Education/referral: consider Nepro or BOOST,  benaprotein      Sodium: 2  gm    Potassium:      Phosphorus:      Protein:      Fluid:       10. ESRD planing: not indicated at this time       Education:       Access:    DVT: on lovenox 1.5 mg/kg if gfr drops below 30 cc/min would reduce dose to 1mg/kg    13.  Please avoid or minimize all NSAIDS (ibuprofen, motrin, aleve, indocin, naprosyn) to minimize the risk to your kidneys.      14. Follow up in :3 months standing CBC renal function panel, mg  monthly we'll add PTH and vitamin D in 6 months

## 2017-09-28 NOTE — Clinical Note
Cancel standing labs from July and order now standign albs rfp, mg, UA monthly startign 10/13 Friday

## 2017-10-03 ENCOUNTER — TELEPHONE (OUTPATIENT)
Dept: PHARMACY | Facility: CLINIC | Age: 72
End: 2017-10-03

## 2017-10-03 DIAGNOSIS — C34.31 MALIGNANT NEOPLASM OF LOWER LOBE OF RIGHT LUNG: ICD-10-CM

## 2017-10-03 RX ORDER — ERLOTINIB HYDROCHLORIDE 150 MG/1
TABLET ORAL
Qty: 30 TABLET | Refills: 6 | Status: ON HOLD | OUTPATIENT
Start: 2017-10-03 | End: 2017-10-26 | Stop reason: HOSPADM

## 2017-10-04 ENCOUNTER — INITIAL CONSULT (OUTPATIENT)
Dept: NEUROLOGY | Facility: CLINIC | Age: 72
End: 2017-10-04
Payer: MEDICARE

## 2017-10-04 DIAGNOSIS — G31.84 MILD NEUROCOGNITIVE DISORDER: ICD-10-CM

## 2017-10-04 DIAGNOSIS — R41.3 MEMORY DEFICITS: ICD-10-CM

## 2017-10-04 DIAGNOSIS — F33.41 RECURRENT MAJOR DEPRESSIVE DISORDER, IN PARTIAL REMISSION: ICD-10-CM

## 2017-10-04 PROCEDURE — 90791 PSYCH DIAGNOSTIC EVALUATION: CPT | Mod: S$GLB,,, | Performed by: CLINICAL NEUROPSYCHOLOGIST

## 2017-10-04 PROCEDURE — 96118 PR NEUROPSYCH TESTING BY PSYCH/PHYS: CPT | Mod: S$GLB,,, | Performed by: CLINICAL NEUROPSYCHOLOGIST

## 2017-10-04 PROCEDURE — 96119 PR NEUROPSYCH TESTING BY TECHNICIAN: CPT | Mod: 59,S$GLB,, | Performed by: CLINICAL NEUROPSYCHOLOGIST

## 2017-10-04 NOTE — PROGRESS NOTES
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 information   v. Ask them to repeat if you zone out.   vi. Have them simplify and reduce information that you need to attend to during conversation.   vii. Have visual cues to remind you if you need to do something later.  b. Processing Speed:   i. Using multiple modalities (e.g., listening, writing notes, asking questions, recording) to learn new information is likely to allow additional time for processing, thus improving memory for the material.   ii. Allowing sufficient time to complete tasks will reduce frustration and help to ensure completion.  c. Executive Functioning:  i. Dont attempt to multi-task.  Separate tasks so that each can be completed one at a time.  ii. Consider using a calendar/day planner, as that may be effective to help you plan and stay on track.  Color-coding specific tasks by importance may add additional benefit to your planner.  iii. Break down large projects into smaller tasks and write down the steps to completing the task.  Taking notes while reading can help with recall.  d. Practice good cognitive hygiene:  i. Engage in regular exercise, which increases alertness and arousal and can improve attention and focus.  Consider lower impact  exercises, such as yoga or light walking.  ii. Get a good nights sleep, as this can enhance alertness and cognition.  iii. Eat healthy foods and balanced meals. It is notable that research indicates certain nutrients may aid in brain function, such as B vitamins (especially B6, B12, and folic acid), antioxidants (such as vitamins C and E, and beta carotene), and Omega-3 fatty acids. Talk with your physician or nutritionist about whats right for you.   iv. Keep your brain active. Find activities to stay mentally active, such as reading, games (cards, checkers), puzzles (crosswords, Sudoku, jig saw), crafts (models, woodworking), gardening, or participating in activities in the community.  v. Stay socially engaged. Continue staying active with your family and friends.

## 2017-10-08 ENCOUNTER — HOSPITAL ENCOUNTER (INPATIENT)
Facility: HOSPITAL | Age: 72
LOS: 3 days | Discharge: NURSING, REHAB OR LONG-TERM CARE | DRG: 064 | End: 2017-10-11
Attending: EMERGENCY MEDICINE | Admitting: PSYCHIATRY & NEUROLOGY
Payer: MEDICARE

## 2017-10-08 DIAGNOSIS — H53.9 VISUAL DISTURBANCE: Primary | ICD-10-CM

## 2017-10-08 DIAGNOSIS — R27.0 ATAXIA: ICD-10-CM

## 2017-10-08 DIAGNOSIS — R42 DIZZINESS: ICD-10-CM

## 2017-10-08 DIAGNOSIS — I63.9 CEREBELLAR INFARCT: ICD-10-CM

## 2017-10-08 DIAGNOSIS — R11.2 NON-INTRACTABLE VOMITING WITH NAUSEA, UNSPECIFIED VOMITING TYPE: ICD-10-CM

## 2017-10-08 DIAGNOSIS — I63.441 CEREBRAL INFARCTION DUE TO EMBOLISM OF RIGHT CEREBELLAR ARTERY: ICD-10-CM

## 2017-10-08 DIAGNOSIS — I63.9 STROKE: ICD-10-CM

## 2017-10-08 PROBLEM — R55 SYNCOPE AND COLLAPSE: Status: RESOLVED | Noted: 2017-02-20 | Resolved: 2017-10-08

## 2017-10-08 PROBLEM — R60.0 PERIPHERAL EDEMA: Status: RESOLVED | Noted: 2017-04-07 | Resolved: 2017-10-08

## 2017-10-08 PROBLEM — E87.20 METABOLIC ACIDOSIS: Status: RESOLVED | Noted: 2017-04-07 | Resolved: 2017-10-08

## 2017-10-08 PROBLEM — E83.42 HYPOMAGNESEMIA: Status: RESOLVED | Noted: 2017-02-13 | Resolved: 2017-10-08

## 2017-10-08 PROBLEM — R82.81 PYURIA: Status: RESOLVED | Noted: 2017-02-14 | Resolved: 2017-10-08

## 2017-10-08 PROBLEM — G93.6 CYTOTOXIC CEREBRAL EDEMA: Status: ACTIVE | Noted: 2017-10-08

## 2017-10-08 PROBLEM — N81.89 PELVIC FLOOR WEAKNESS: Status: RESOLVED | Noted: 2017-03-07 | Resolved: 2017-10-08

## 2017-10-08 PROBLEM — R39.15 URINARY URGENCY: Status: RESOLVED | Noted: 2017-03-28 | Resolved: 2017-10-08

## 2017-10-08 LAB
ABO + RH BLD: NORMAL
ALBUMIN SERPL BCP-MCNC: 2.6 G/DL
ALP SERPL-CCNC: 76 U/L
ALT SERPL W/O P-5'-P-CCNC: 21 U/L
ANION GAP SERPL CALC-SCNC: 6 MMOL/L
APTT BLDCRRT: 22 SEC
AST SERPL-CCNC: 25 U/L
BASOPHILS # BLD AUTO: 0.03 K/UL
BASOPHILS NFR BLD: 0.4 %
BILIRUB SERPL-MCNC: 1.5 MG/DL
BLD GP AB SCN CELLS X3 SERPL QL: NORMAL
BUN SERPL-MCNC: 23 MG/DL
CALCIUM SERPL-MCNC: 7.8 MG/DL
CHLORIDE SERPL-SCNC: 117 MMOL/L
CHOLEST SERPL-MCNC: 172 MG/DL
CHOLEST/HDLC SERPL: 2.3 {RATIO}
CO2 SERPL-SCNC: 21 MMOL/L
CREAT SERPL-MCNC: 1.5 MG/DL
CREAT SERPL-MCNC: 1.5 MG/DL (ref 0.5–1.4)
DIFFERENTIAL METHOD: ABNORMAL
EOSINOPHIL # BLD AUTO: 0.2 K/UL
EOSINOPHIL NFR BLD: 2.9 %
ERYTHROCYTE [DISTWIDTH] IN BLOOD BY AUTOMATED COUNT: 13.8 %
EST. GFR  (AFRICAN AMERICAN): 39.6 ML/MIN/1.73 M^2
EST. GFR  (NON AFRICAN AMERICAN): 34.3 ML/MIN/1.73 M^2
ESTIMATED AVG GLUCOSE: 71 MG/DL
GLUCOSE SERPL-MCNC: 89 MG/DL
HBA1C MFR BLD HPLC: 4.1 %
HCT VFR BLD AUTO: 35.5 %
HDLC SERPL-MCNC: 76 MG/DL
HDLC SERPL: 44.2 %
HGB BLD-MCNC: 11.1 G/DL
INR PPP: 0.9
LDLC SERPL CALC-MCNC: 83.4 MG/DL
LYMPHOCYTES # BLD AUTO: 1.9 K/UL
LYMPHOCYTES NFR BLD: 26 %
MCH RBC QN AUTO: 28.7 PG
MCHC RBC AUTO-ENTMCNC: 31.3 G/DL
MCV RBC AUTO: 92 FL
MONOCYTES # BLD AUTO: 0.8 K/UL
MONOCYTES NFR BLD: 11.6 %
NEUTROPHILS # BLD AUTO: 4.3 K/UL
NEUTROPHILS NFR BLD: 58.7 %
NONHDLC SERPL-MCNC: 96 MG/DL
PLATELET # BLD AUTO: 274 K/UL
PMV BLD AUTO: 9.5 FL
POC PTINR: 1.1 (ref 0.9–1.2)
POC PTWBT: 13 SEC (ref 9.7–14.3)
POCT GLUCOSE: 94 MG/DL (ref 70–110)
POTASSIUM SERPL-SCNC: 3.6 MMOL/L
PROT SERPL-MCNC: 6.2 G/DL
PROTHROMBIN TIME: 10.2 SEC
RBC # BLD AUTO: 3.87 M/UL
SAMPLE: ABNORMAL
SAMPLE: NORMAL
SODIUM SERPL-SCNC: 144 MMOL/L
TRIGL SERPL-MCNC: 63 MG/DL
TSH SERPL DL<=0.005 MIU/L-ACNC: 2.94 UIU/ML
WBC # BLD AUTO: 7.24 K/UL

## 2017-10-08 PROCEDURE — 80061 LIPID PANEL: CPT

## 2017-10-08 PROCEDURE — 99223 1ST HOSP IP/OBS HIGH 75: CPT | Mod: AI,,, | Performed by: PSYCHIATRY & NEUROLOGY

## 2017-10-08 PROCEDURE — A4216 STERILE WATER/SALINE, 10 ML: HCPCS | Performed by: STUDENT IN AN ORGANIZED HEALTH CARE EDUCATION/TRAINING PROGRAM

## 2017-10-08 PROCEDURE — 25000003 PHARM REV CODE 250: Performed by: STUDENT IN AN ORGANIZED HEALTH CARE EDUCATION/TRAINING PROGRAM

## 2017-10-08 PROCEDURE — 82962 GLUCOSE BLOOD TEST: CPT

## 2017-10-08 PROCEDURE — 82803 BLOOD GASES ANY COMBINATION: CPT

## 2017-10-08 PROCEDURE — 85730 THROMBOPLASTIN TIME PARTIAL: CPT

## 2017-10-08 PROCEDURE — 63600175 PHARM REV CODE 636 W HCPCS: Performed by: EMERGENCY MEDICINE

## 2017-10-08 PROCEDURE — 86900 BLOOD TYPING SEROLOGIC ABO: CPT

## 2017-10-08 PROCEDURE — 82565 ASSAY OF CREATININE: CPT

## 2017-10-08 PROCEDURE — 93005 ELECTROCARDIOGRAM TRACING: CPT

## 2017-10-08 PROCEDURE — 99900035 HC TECH TIME PER 15 MIN (STAT)

## 2017-10-08 PROCEDURE — 86850 RBC ANTIBODY SCREEN: CPT

## 2017-10-08 PROCEDURE — 85610 PROTHROMBIN TIME: CPT

## 2017-10-08 PROCEDURE — 25000003 PHARM REV CODE 250: Performed by: EMERGENCY MEDICINE

## 2017-10-08 PROCEDURE — 83036 HEMOGLOBIN GLYCOSYLATED A1C: CPT

## 2017-10-08 PROCEDURE — 25500020 PHARM REV CODE 255: Performed by: EMERGENCY MEDICINE

## 2017-10-08 PROCEDURE — 85025 COMPLETE CBC W/AUTO DIFF WBC: CPT

## 2017-10-08 PROCEDURE — 20600001 HC STEP DOWN PRIVATE ROOM

## 2017-10-08 PROCEDURE — P9612 CATHETERIZE FOR URINE SPEC: HCPCS

## 2017-10-08 PROCEDURE — 84443 ASSAY THYROID STIM HORMONE: CPT

## 2017-10-08 PROCEDURE — 63600175 PHARM REV CODE 636 W HCPCS: Performed by: STUDENT IN AN ORGANIZED HEALTH CARE EDUCATION/TRAINING PROGRAM

## 2017-10-08 PROCEDURE — 99291 CRITICAL CARE FIRST HOUR: CPT | Mod: ,,, | Performed by: EMERGENCY MEDICINE

## 2017-10-08 PROCEDURE — 96376 TX/PRO/DX INJ SAME DRUG ADON: CPT

## 2017-10-08 PROCEDURE — 25000003 PHARM REV CODE 250: Performed by: PHYSICIAN ASSISTANT

## 2017-10-08 PROCEDURE — 80053 COMPREHEN METABOLIC PANEL: CPT

## 2017-10-08 PROCEDURE — A9585 GADOBUTROL INJECTION: HCPCS | Performed by: EMERGENCY MEDICINE

## 2017-10-08 PROCEDURE — 96375 TX/PRO/DX INJ NEW DRUG ADDON: CPT

## 2017-10-08 PROCEDURE — 96365 THER/PROPH/DIAG IV INF INIT: CPT

## 2017-10-08 PROCEDURE — 93010 ELECTROCARDIOGRAM REPORT: CPT | Mod: ,,, | Performed by: INTERNAL MEDICINE

## 2017-10-08 PROCEDURE — 99285 EMERGENCY DEPT VISIT HI MDM: CPT | Mod: 25

## 2017-10-08 RX ORDER — MECLIZINE HCL 12.5 MG 12.5 MG/1
25 TABLET ORAL
Status: COMPLETED | OUTPATIENT
Start: 2017-10-08 | End: 2017-10-08

## 2017-10-08 RX ORDER — ONDANSETRON 2 MG/ML
4 INJECTION INTRAMUSCULAR; INTRAVENOUS
Status: COMPLETED | OUTPATIENT
Start: 2017-10-08 | End: 2017-10-08

## 2017-10-08 RX ORDER — GADOBUTROL 604.72 MG/ML
7 INJECTION INTRAVENOUS
Status: COMPLETED | OUTPATIENT
Start: 2017-10-08 | End: 2017-10-08

## 2017-10-08 RX ORDER — MECLIZINE HCL 12.5 MG 12.5 MG/1
12.5-25 TABLET ORAL 3 TIMES DAILY PRN
Qty: 30 TABLET | Refills: 0 | Status: SHIPPED | OUTPATIENT
Start: 2017-10-08 | End: 2018-09-21

## 2017-10-08 RX ORDER — METOPROLOL SUCCINATE 50 MG/1
50 TABLET, EXTENDED RELEASE ORAL DAILY
Status: DISCONTINUED | OUTPATIENT
Start: 2017-10-08 | End: 2017-10-11 | Stop reason: HOSPADM

## 2017-10-08 RX ORDER — PROMETHAZINE HYDROCHLORIDE 25 MG/ML
INJECTION, SOLUTION INTRAMUSCULAR; INTRAVENOUS
Status: DISPENSED
Start: 2017-10-08 | End: 2017-10-08

## 2017-10-08 RX ORDER — PROMETHAZINE HYDROCHLORIDE 12.5 MG/1
12.5 TABLET ORAL EVERY 6 HOURS PRN
Status: DISCONTINUED | OUTPATIENT
Start: 2017-10-08 | End: 2017-10-11 | Stop reason: HOSPADM

## 2017-10-08 RX ORDER — AMLODIPINE BESYLATE 5 MG/1
5 TABLET ORAL DAILY
Status: DISCONTINUED | OUTPATIENT
Start: 2017-10-08 | End: 2017-10-11 | Stop reason: HOSPADM

## 2017-10-08 RX ORDER — LORAZEPAM 1 MG/1
1 TABLET ORAL EVERY 6 HOURS PRN
Status: DISCONTINUED | OUTPATIENT
Start: 2017-10-08 | End: 2017-10-11 | Stop reason: HOSPADM

## 2017-10-08 RX ORDER — ONDANSETRON 2 MG/ML
4 INJECTION INTRAMUSCULAR; INTRAVENOUS ONCE
Status: COMPLETED | OUTPATIENT
Start: 2017-10-08 | End: 2017-10-08

## 2017-10-08 RX ORDER — AMITRIPTYLINE HYDROCHLORIDE 50 MG/1
50 TABLET, FILM COATED ORAL NIGHTLY
Status: DISCONTINUED | OUTPATIENT
Start: 2017-10-08 | End: 2017-10-11 | Stop reason: HOSPADM

## 2017-10-08 RX ORDER — SODIUM CHLORIDE 0.9 % (FLUSH) 0.9 %
3 SYRINGE (ML) INJECTION EVERY 8 HOURS
Status: DISCONTINUED | OUTPATIENT
Start: 2017-10-08 | End: 2017-10-11 | Stop reason: HOSPADM

## 2017-10-08 RX ORDER — ERLOTINIB HYDROCHLORIDE 150 MG/1
150 TABLET, FILM COATED ORAL DAILY
Status: DISCONTINUED | OUTPATIENT
Start: 2017-10-08 | End: 2017-10-11 | Stop reason: HOSPADM

## 2017-10-08 RX ORDER — LABETALOL HYDROCHLORIDE 5 MG/ML
10 INJECTION, SOLUTION INTRAVENOUS
Status: DISCONTINUED | OUTPATIENT
Start: 2017-10-08 | End: 2017-10-11 | Stop reason: HOSPADM

## 2017-10-08 RX ORDER — ATORVASTATIN CALCIUM 20 MG/1
40 TABLET, FILM COATED ORAL DAILY
Status: DISCONTINUED | OUTPATIENT
Start: 2017-10-08 | End: 2017-10-11 | Stop reason: HOSPADM

## 2017-10-08 RX ORDER — ONDANSETRON 4 MG/1
4 TABLET, ORALLY DISINTEGRATING ORAL EVERY 6 HOURS PRN
Status: DISCONTINUED | OUTPATIENT
Start: 2017-10-08 | End: 2017-10-11 | Stop reason: HOSPADM

## 2017-10-08 RX ADMIN — RIVAROXABAN 20 MG: 20 TABLET, FILM COATED ORAL at 06:10

## 2017-10-08 RX ADMIN — AMLODIPINE BESYLATE 5 MG: 5 TABLET ORAL at 01:10

## 2017-10-08 RX ADMIN — AMITRIPTYLINE HYDROCHLORIDE 50 MG: 50 TABLET, FILM COATED ORAL at 09:10

## 2017-10-08 RX ADMIN — PROMETHAZINE HYDROCHLORIDE 12.5 MG: 25 INJECTION INTRAMUSCULAR; INTRAVENOUS at 11:10

## 2017-10-08 RX ADMIN — ONDANSETRON 4 MG: 2 INJECTION INTRAMUSCULAR; INTRAVENOUS at 09:10

## 2017-10-08 RX ADMIN — ERLOTINIB HYDROCHLORIDE 150 MG: 150 TABLET ORAL at 08:10

## 2017-10-08 RX ADMIN — ATORVASTATIN CALCIUM 40 MG: 20 TABLET, FILM COATED ORAL at 01:10

## 2017-10-08 RX ADMIN — ONDANSETRON 4 MG: 2 INJECTION INTRAMUSCULAR; INTRAVENOUS at 06:10

## 2017-10-08 RX ADMIN — MECLIZINE 25 MG: 12.5 TABLET ORAL at 06:10

## 2017-10-08 RX ADMIN — Medication 3 ML: at 10:10

## 2017-10-08 RX ADMIN — ONDANSETRON 4 MG: 2 INJECTION INTRAMUSCULAR; INTRAVENOUS at 08:10

## 2017-10-08 RX ADMIN — GADOBUTROL 7 ML: 604.72 INJECTION INTRAVENOUS at 11:10

## 2017-10-08 RX ADMIN — LORAZEPAM 1 MG: 1 TABLET ORAL at 09:10

## 2017-10-08 RX ADMIN — LEVETIRACETAM 750 MG: 250 TABLET, FILM COATED ORAL at 08:10

## 2017-10-08 RX ADMIN — METOPROLOL SUCCINATE 50 MG: 50 TABLET, EXTENDED RELEASE ORAL at 01:10

## 2017-10-08 NOTE — HPI
"73 y/o female who went to bed around 2300 on 10-7-17. She woke up around 0530 she tried to get up and could not then opened her eyes and she felt "funny" like everything was upside down and then blurry, she also vomited. Per  she also had dysarthria for a few minutes. EMS was called.  Upon arrival states that when she opens her eyes everything is spinning no longer blurry or upside down. Denies and aphasia or dysarthria, extremity weakness or sensory loss. Does have dysmetria on the right arm.   Patient states she has never felt this before and has never had vertigo before.   Risk factors are HTN, cancers  Differential dx are cerebellar stroke vs vertigo  "

## 2017-10-08 NOTE — ED TRIAGE NOTES
"Naty St, a 72 y.o. female presents to the ED       Chief Complaint   Patient presents with    Blurred Vision     At 5am pt was waking up and she was c/o blurred vision/vision changes with N/V and  called EMS. Pt is A, A, O x 3. Denies weakness or pain.       Review of patient's allergies indicates:   Allergen Reactions    Tobradex [tobramycin-dexamethasone] Swelling    Iodinated contrast- oral and iv dye Hives    Morphine Other (See Comments)     "shaking" and tremors    Latex Rash    Phenytoin sodium extended Other (See Comments) and Rash     Past Medical History:   Diagnosis Date    Blood clot in vein 06/2016    Breast cancer 1994    Cataract     History of breast cancer     History of pancreatic cancer     Hx of psychiatric care     Hypertension     Pancreatic cancer 1998    Posterior capsular opacification, left eye     Psychiatric problem     Seizures 01/25/2016    Therapy      "

## 2017-10-08 NOTE — NURSING
Admitted pt to floor. Orientated to bed and fall precautions. Oriented to diet and how to order it. Answered all questions that pt had upon admit. Diet served. Pt appreciative. Family at bedside.

## 2017-10-08 NOTE — ED PROVIDER NOTES
"Encounter Date: 10/8/2017       History   No chief complaint on file.    HPI   72-year-old female presents the emergency department with complaint of visual disturbance, she stated to EMS that everything looks "upside down."  When I asked her about her visual disturbance she said everything just looks "funny".  She was hesitant to open her eyes because the way her vision looks was disturbing to her.  She states that at home about 30min PTA her symptoms began.  She did vomit once after she woke up.  She was last seen normal by her  at 1 AM.  EMS reports that on arrival she did have some drift in her left arm and left leg and intermittent slurred speech.      Review of patient's allergies indicates:   Allergen Reactions    Tobradex [tobramycin-dexamethasone] Swelling    Iodinated contrast- oral and iv dye Hives    Morphine Other (See Comments)     "shaking" and tremors    Latex Rash    Phenytoin sodium extended Other (See Comments) and Rash     Past Medical History:   Diagnosis Date    Blood clot in vein 06/2016    Breast cancer 1994    Cataract     History of breast cancer     History of pancreatic cancer     Hx of psychiatric care     Hypertension     Pancreatic cancer 1998    Posterior capsular opacification, left eye     Psychiatric problem     Seizures 01/25/2016    Therapy      Past Surgical History:   Procedure Laterality Date    BONE BIOSPY  3/9/16    BRAIN SURGERY  1/12/16    tumor removal 1/12/16    BREAST LUMPECTOMY  1998    left    BREAST LUMPECTOMY      left    CATARACT EXTRACTION Bilateral 2004    yag OU    CHOLECYSTECTOMY  1998    COLONOSCOPY N/A 11/13/2015    Procedure: COLONOSCOPY;  Surgeon: Alex Carmona MD;  Location: [address]);  Service: Endoscopy;  Laterality: N/A;  2 year f/u    cysto and right ureteral stent  4/27/12    ecoli  2010    removal of blockage, done throat, then through the liver.    HYSTERECTOMY  1974    KIDNEY STONE SURGERY  " 2010--laser    left eswl  6/20/12    OOPHORECTOMY  4/25/2012    Laparoscopic BSO, lysis of adhesions, cystoscopy greater than 35mins     pancreatic cancer      whipple    WHIPPLE PROCEDURE W/ LAPAROSCOPY  1998     Family History   Problem Relation Age of Onset    Breast cancer Mother     Lung cancer Mother     Leukemia Paternal Grandfather     Stomach cancer Paternal Grandmother     Colon cancer Neg Hx     Ovarian cancer Neg Hx     Dementia Neg Hx      Social History   Substance Use Topics    Smoking status: Former Smoker     Packs/day: 0.25     Years: 0.50     Quit date: 9/26/1961    Smokeless tobacco: Never Used    Alcohol use No     Review of Systems   Eyes: Positive for visual disturbance.   Gastrointestinal: Positive for nausea and vomiting.   Neurological:        Left arm and left leg weakness   All other systems reviewed and are negative.      Physical Exam     Initial Vitals   BP Pulse Resp Temp SpO2   -- -- -- -- --      MAP       --         Vitals:    10/08/17 0551   BP: (!) 147/76   Pulse: 75   Resp: 18   Temp: 98.2 °F (36.8 °C)   TempSrc: Oral   SpO2: 100%   Weight: 63.5 kg (140 lb)       Physical Exam  Gen/Constitutional: Interactive. Uncomfortable appearing, eyes closed.  Head: Normocephalic, Atraumatic  Neck: supple, no masses or LAD  Eyes: PERRLA, conjunctiva clear  Ears, Nose and Throat: No rhinorrhea or stridor.  Cardiac: Reg Rhythm, No murmur  Pulmonary: CTA Bilat, no wheezes, rhonchi, rales.  GI: Abdomen soft, non-tender, non-distended; no rebound or guarding  : No CVA tenderness.  Musculoskeletal: Extremities warm, well perfused, no erythema, no edema  Skin: No rashes  Neuro: Alert and Oriented x 3; No focal motor or sensory deficits.  No pronator drift in upper or lower extremities, no face droop, no dysarthria.  torsional nystagmus with right side gaze, no direction changing nystagmus.    Psych: Normal affect      ED Course   Procedures  Labs Reviewed - No data to display  EKG  "Readings: (Independently Interpreted)   EKG: NSR, no DENNY's or STD's, non-specific twave pattern, no STEMI       Clinical Tests:  Labs Test(s) were ordered and reviewed by me.  Radiological study(s) were ordered and reviewed by me.  Medical test(s) were ordered and reviewed by me.    Patient presents the emergency department with visual disturbance which on further interview after my initial assessment she describes as "it looks like things are moving when I open my eyes", nausea and vomiting and reported by EMS left-sided arm and leg weakness and some intermittent slurred speech that started 30 minutes prior to arrival that I did not appreciate on my exam.  I considered hemorrhagic stroke, ischemic stroke, BPPV among other diagnoses.  Stroke code activated on patient arrival.  Patient went to CT scanner.      When pt came back from CT vascular neurology is at the bedside to evaluate the patient.    Vascular neurology recommendations pending.    Patient signed out to Dr. Bledsoe pending final recommendations from vascular neurology and results of MRI brain and re-assessment for symptomatic control.      Based on my clinical exam I felt the most likely diagnosis was peripheral vertigo, though TIA vs posterior stroke also possible.      We'll give IV Zofran and oral meclizine for symptomatic control at this time.      Critical Care Procedure Note:  Direct patient critical care time: 10 minutes  Additional history critical care time:10 minutes  Ordering / reviewing labs and studies: critical care time:10 minutes  Documentation critical care time: 10 minutes  Consulting other physicians critical care time: 10 minutes  Total critical care time (exclusive of procedural time) : 50 minutes   Reason for Critical Care: Acute neurologic deficit.                             ED Course      Clinical Impression:   The primary encounter diagnosis was Visual disturbance. Diagnoses of Dizziness and Non-intractable vomiting with nausea, " unspecified vomiting type were also pertinent to this visit.                           Sudarshan Jauregui MD  10/08/17 0612       Sudarshan Jauregui MD  10/08/17 0627

## 2017-10-08 NOTE — H&P
Please see Consult Note from Bee Edwards NP dated 10/8/17, 0605.    Kyung Arriaza MD  Vascular Neurology   IM PGY-2

## 2017-10-08 NOTE — ED NOTES
Patient vomited off of side of bed, states she feels better but is still dizzy, aware we are awaiting MRI results at this time. Will continue to monitor

## 2017-10-08 NOTE — CONSULTS
"[facility]  Vascular Neurology  Comprehensive Stroke Center  Consult Note    Inpatient consult to Vascular (Stroke) Neurology  Consult performed by: ERICA ANTONIO  Consult ordered by: SCOTT RIVERA  Reason for consult: dizziness        Assessment/Plan:     Patient is a 72 y.o. year old female with:    Vertigo    Treated per ER MD  MRI/MRA Brain and neck to visualize possible stroke            Thrombolysis Candidate? No  1. Contraindications: Outside of treament window    Interventional Revascularization Candidate?  No; No significant neurological deficit    Research Candidate? No    Subjective:     History of Present Illness:  73 y/o female who went to bed around 2300 on 10-7-17. She woke up around 0530 she tried to get up and could not then opened her eyes and she felt "funny" like everything was upside down and then blurry, she also vomited. Per  she also had dysarthria for a few minutes. EMS was called.  Upon arrival states that when she opens her eyes everything is spinning no longer blurry or upside down. Denies and aphasia or dysarthria, extremity weakness or sensory loss. Does have dysmetria on the right arm.   Patient states she has never felt this before and has never had vertigo before.   Risk factors are HTN, cancers  Differential dx are cerebellar stroke vs vertigo         Past Medical History:   Diagnosis Date    Blood clot in vein 06/2016    Breast cancer 1994    Cataract     Hypertension     Pancreatic cancer 1998    Posterior capsular opacification, left eye     Psychiatric problem     Seizures 01/25/2016    Therapy      Past Surgical History:   Procedure Laterality Date    BONE BIOSPY  3/9/16    BRAIN SURGERY  1/12/16    tumor removal 1/12/16    BREAST LUMPECTOMY  1998    left    CATARACT EXTRACTION Bilateral 2004    yag OU    CHOLECYSTECTOMY  1998    COLONOSCOPY N/A 11/13/2015    Procedure: COLONOSCOPY;  Surgeon: Alex Carmona MD;  Location: " [facility] (4TH F[facility]);  Service: Endoscopy;  Laterality: N/A;  2 year f/u    cysto and right ureteral stent  4/27/12    ecoli  2010    removal of blockage, done throat, then through the liver.    HYSTERECTOMY  1974    KIDNEY STONE SURGERY  2010--laser    left eswl  6/20/12    OOPHORECTOMY  4/25/2012    Laparoscopic BSO, lysis of adhesions, cystoscopy greater than 35mins     WHIPPLE PROCEDURE W/ LAPAROSCOPY  1998     Family History   Problem Relation Age of Onset    Breast cancer Mother     Lung cancer Mother     Leukemia Paternal Grandfather     Stomach cancer Paternal Grandmother      Social History   Substance Use Topics    Smoking status: Former Smoker     Packs/day: 0.25     Years: 0.50     Quit date: 9/26/1961    Smokeless tobacco: Never Used    Alcohol use No     Review of patient's allergies indicates:   Allergen Reactions    Tobradex [tobramycin-dexamethasone] Swelling    Iodinated contrast- oral and iv dye Hives    Morphine Other (See Comments)     "shaking" and tremors    Latex Rash    Phenytoin sodium extended Other (See Comments) and Rash     Medications: I have reviewed the current medication administration record.      (Not in a hospital admission)    Review of Systems   Constitutional: Negative for chills and fever.   HENT: Negative for ear discharge and ear pain.    Eyes: Negative for pain and itching.   Respiratory: Negative for cough and shortness of breath.    Cardiovascular: Negative for chest pain and leg swelling.   Gastrointestinal: Positive for nausea and vomiting.   Endocrine: Negative for polyphagia and polyuria.   Genitourinary: Negative for difficulty urinating and dysuria.   Musculoskeletal: Negative for arthralgias and back pain.   Skin: Negative for rash and wound.   Neurological: Positive for dizziness.     Objective:     Vital Signs (Most Recent):  Temp: 98.2 °F (36.8 °C) (10/08/17 0551)  Pulse: 65 (10/08/17 0616)  Resp: (!) 23 (10/08/17 0616)  BP: (!) 158/75 " (10/08/17 0603)  SpO2: 100 % (10/08/17 0616)    Vital Signs Range (Last 24H):  Temp:  [98.2 °F (36.8 °C)]   Pulse:  [65-75]   Resp:  [18-23]   BP: (147-158)/(75-76)   SpO2:  [98 %-100 %]     Physical Exam   Constitutional: She is oriented to person, place, and time. She appears well-developed and well-nourished.   HENT:   Head: Normocephalic and atraumatic.   Right eye nystagmus up   Eyes: EOM are normal. Pupils are equal, round, and reactive to light.   Neck: Normal range of motion.   Cardiovascular: Normal rate.    Pulmonary/Chest: Effort normal.   Abdominal: Soft.   Neurological: She is alert and oriented to person, place, and time. GCS eye subscore is 4 - spontaneous. GCS verbal subscore is 5 - oriented. GCS motor subscore is 6 - obeys commands.   Skin: Skin is warm and dry.   Nursing note and vitals reviewed.      Neurological Exam:   LOC: alert and follows requests  Language: No aphasia  Speech: No dysarthria  Orientation: Person, Place, Time  Memory: Recent memory intact, Remote memory intact, Age correct, Month correct  Visual Fields (CN II): Full  EOM (CN III, IV, VI): Full/intact  Oculocephalics: normal  Pupils (CN III, IV, VI): PERRL  Facial Sensation (CN V): Symmetric, Corneal reflex intact  Facial Movement (CN VII): symmetric facial expression  Hearing (CN VIII): intact bilaterally  Gag Reflex (CN IX, X): normal/symmetric  Shoulder/Neck (CN XI): SCM-Left: Normal ; SCM-Right: Normal ; Shoulder Shrug: Normal/Symetric  Tongue (CN XII): to midline  Reflexes: flexor plantar responses bilaterally  Motor*: Arm Left:  Normal (5/5), Leg Left:   Normal (5/5), Arm Right:   Normal (5/5), Leg Right:   Normal (5/5)  Cerebellar*: Finger/Nose Abnormal - right lower  Sensation: intact to light touch, temperature and vibration  Tone: Arm-Left: normal; Leg-Left: normal; Arm-Right: normal; Leg-Right: normal    NIH Stroke Scale:  Interval: baseline (upon arrival/admit)  Level of Consciousness: 0 - alert  LOC Questions: 0 -  answers both correctly  LOC Commands: 0 - performs both correctly  Best Gaze: 0 - normal  Visual: 0 - no visual loss  Facial Palsy: 0 - normal  Motor Left Arm: 0 - no drift  Motor Right Arm: 0 - no drift  Motor Left Le - no drift  Motor Right Le - no drift  Limb Ataxia: 1 - present in one limb  Sensory: 0 - normal  Best Language: 0 - no aphasia  Dysarthria: 0 - normal articulation  Extinction and Inattention: 0 - no neglect  NIH Stroke Scale Total: 1  Noé Coma Scale:  Best Eye Response: 4 - spontaneous  Best Motor Response: 6 - obeys commands  Best Verbal Response: 5 - oriented  Noé Coma Scale Total: 15  Modified [place] Scale:   Timeline: Prior to symptoms onset  Modified [place] Score: 0 - no symptoms        Laboratory:  CMP:   Recent Labs  Lab 10/08/17  0619   CALCIUM 7.8*   ALBUMIN 2.6*   PROT 6.2      K 3.6   CO2 21*   *   BUN 23   CREATININE 1.5*   ALKPHOS 76   ALT 21   AST 25   BILITOT 1.5*     CBC:   Recent Labs  Lab 10/08/17  0619   WBC 7.24   RBC 3.87*   HGB 11.1*   HCT 35.5*      MCV 92   MCH 28.7   MCHC 31.3*     Lipid Panel:   Recent Labs  Lab 10/08/17  0619   CHOL 172   LDLCALC 83.4   HDL 76*   TRIG 63     Coagulation:   Recent Labs  Lab 10/08/17  0619   INR 0.9   APTT 22.0     Platelet Aggregation Study: No results for input(s): PLTAGG, PLTAGINTERP, PLTAGREGLACO, ADPPLTAGGREG in the last 168 hours.  Hgb A1C: No results for input(s): HGBA1C in the last 168 hours.  TSH: No results for input(s): TSH in the last 168 hours.    Diagnostic Results:  Brain Imaging:   CT head w/o contrast 10-8-17 results:  1.  Postoperative change of left frontoparietal craniotomy with stable encephalomalacia of the left frontal lobe. No CT evidence of acute intracranial abnormality. Further evaluation and followup as clinically warranted.    2.  Hyperdensity intermixed with sinonasal opacity within the left maxillary sinus may represent inspissated secretions versus fungal  colonization.      Cardiac Evaluation:   EKG/Telemetry: 10-8-17 unconfirmed results:  Normal sinus rhythm      Bee Edwards NP  [facility]  Department of Vascular Neurology   [facility]

## 2017-10-08 NOTE — ED NOTES
"Pt states "I'm allergic to contrast. I get a rash." Stroke Service aware and will speak to Radiologist. Awaiting further orders.   "

## 2017-10-08 NOTE — SUBJECTIVE & OBJECTIVE
"     Past Medical History:   Diagnosis Date    Blood clot in vein 06/2016    Breast cancer 1994    Cataract     Hypertension     Pancreatic cancer 1998    Posterior capsular opacification, left eye     Psychiatric problem     Seizures 01/25/2016    Therapy      Past Surgical History:   Procedure Laterality Date    BONE BIOSPY  3/9/16    BRAIN SURGERY  1/12/16    tumor removal 1/12/16    BREAST LUMPECTOMY  1998    left    CATARACT EXTRACTION Bilateral 2004    yag OU    CHOLECYSTECTOMY  1998    COLONOSCOPY N/A 11/13/2015    Procedure: COLONOSCOPY;  Surgeon: Alex Carmona MD;  Location: [facility] ([address] [place] [address]);  Service: Endoscopy;  Laterality: N/A;  2 year f/u    cysto and right ureteral stent  4/27/12    ecoli  2010    removal of blockage, done throat, then through the liver.    HYSTERECTOMY  1974    KIDNEY STONE SURGERY  2010--laser    left eswl  6/20/12    OOPHORECTOMY  4/25/2012    Laparoscopic BSO, lysis of adhesions, cystoscopy greater than 35mins     WHIPPLE PROCEDURE W/ LAPAROSCOPY  1998     Family History   Problem Relation Age of Onset    Breast cancer Mother     Lung cancer Mother     Leukemia Paternal Grandfather     Stomach cancer Paternal Grandmother      Social History   Substance Use Topics    Smoking status: Former Smoker     Packs/day: 0.25     Years: 0.50     Quit date: 9/26/1961    Smokeless tobacco: Never Used    Alcohol use No     Review of patient's allergies indicates:   Allergen Reactions    Tobradex [tobramycin-dexamethasone] Swelling    Iodinated contrast- oral and iv dye Hives    Morphine Other (See Comments)     "shaking" and tremors    Latex Rash    Phenytoin sodium extended Other (See Comments) and Rash     Medications: I have reviewed the current medication administration record.      (Not in a hospital admission)    Review of Systems   Constitutional: Negative for chills and fever.   HENT: Negative for ear discharge and ear pain.    Eyes: Negative for " pain and itching.   Respiratory: Negative for cough and shortness of breath.    Cardiovascular: Negative for chest pain and leg swelling.   Gastrointestinal: Positive for nausea and vomiting.   Endocrine: Negative for polyphagia and polyuria.   Genitourinary: Negative for difficulty urinating and dysuria.   Musculoskeletal: Negative for arthralgias and back pain.   Skin: Negative for rash and wound.   Neurological: Positive for dizziness.     Objective:     Vital Signs (Most Recent):  Temp: 98.2 °F (36.8 °C) (10/08/17 0551)  Pulse: 65 (10/08/17 0616)  Resp: (!) 23 (10/08/17 0616)  BP: (!) 158/75 (10/08/17 0603)  SpO2: 100 % (10/08/17 0616)    Vital Signs Range (Last 24H):  Temp:  [98.2 °F (36.8 °C)]   Pulse:  [65-75]   Resp:  [18-23]   BP: (147-158)/(75-76)   SpO2:  [98 %-100 %]     Physical Exam   Constitutional: She is oriented to person, place, and time. She appears well-developed and well-nourished.   HENT:   Head: Normocephalic and atraumatic.   Right eye nystagmus up   Eyes: EOM are normal. Pupils are equal, round, and reactive to light.   Neck: Normal range of motion.   Cardiovascular: Normal rate.    Pulmonary/Chest: Effort normal.   Abdominal: Soft.   Neurological: She is alert and oriented to person, place, and time. GCS eye subscore is 4 - spontaneous. GCS verbal subscore is 5 - oriented. GCS motor subscore is 6 - obeys commands.   Skin: Skin is warm and dry.   Nursing note and vitals reviewed.      Neurological Exam:   LOC: alert and follows requests  Language: No aphasia  Speech: No dysarthria  Orientation: Person, Place, Time  Memory: Recent memory intact, Remote memory intact, Age correct, Month correct  Visual Fields (CN II): Full  EOM (CN III, IV, VI): Full/intact  Oculocephalics: normal  Pupils (CN III, IV, VI): PERRL  Facial Sensation (CN V): Symmetric, Corneal reflex intact  Facial Movement (CN VII): symmetric facial expression  Hearing (CN VIII): intact bilaterally  Gag Reflex (CN IX, X):  normal/symmetric  Shoulder/Neck (CN XI): SCM-Left: Normal ; SCM-Right: Normal ; Shoulder Shrug: Normal/Symetric  Tongue (CN XII): to midline  Reflexes: flexor plantar responses bilaterally  Motor*: Arm Left:  Normal (5/5), Leg Left:   Normal (5/5), Arm Right:   Normal (5/5), Leg Right:   Normal (5/5)  Cerebellar*: Finger/Nose Abnormal - right lower  Sensation: intact to light touch, temperature and vibration  Tone: Arm-Left: normal; Leg-Left: normal; Arm-Right: normal; Leg-Right: normal    NIH Stroke Scale:  Interval: baseline (upon arrival/admit)  Level of Consciousness: 0 - alert  LOC Questions: 0 - answers both correctly  LOC Commands: 0 - performs both correctly  Best Gaze: 0 - normal  Visual: 0 - no visual loss  Facial Palsy: 0 - normal  Motor Left Arm: 0 - no drift  Motor Right Arm: 0 - no drift  Motor Left Le - no drift  Motor Right Le - no drift  Limb Ataxia: 1 - present in one limb  Sensory: 0 - normal  Best Language: 0 - no aphasia  Dysarthria: 0 - normal articulation  Extinction and Inattention: 0 - no neglect  NIH Stroke Scale Total: 1  Noé Coma Scale:  Best Eye Response: 4 - spontaneous  Best Motor Response: 6 - obeys commands  Best Verbal Response: 5 - oriented  Noé Coma Scale Total: 15  Modified Couch Scale:   Timeline: Prior to symptoms onset  Modified Couch Score: 0 - no symptoms        Laboratory:  CMP:   Recent Labs  Lab 10/08/17  0619   CALCIUM 7.8*   ALBUMIN 2.6*   PROT 6.2      K 3.6   CO2 21*   *   BUN 23   CREATININE 1.5*   ALKPHOS 76   ALT 21   AST 25   BILITOT 1.5*     CBC:   Recent Labs  Lab 10/08/17  0619   WBC 7.24   RBC 3.87*   HGB 11.1*   HCT 35.5*      MCV 92   MCH 28.7   MCHC 31.3*     Lipid Panel:   Recent Labs  Lab 10/08/17  0619   CHOL 172   LDLCALC 83.4   HDL 76*   TRIG 63     Coagulation:   Recent Labs  Lab 10/08/17  0619   INR 0.9   APTT 22.0     Platelet Aggregation Study: No results for input(s): PLTAGG, PLTAGINTERP, PLTAGREGLACO,  ADPPLTAGGREG in the last 168 hours.  Hgb A1C: No results for input(s): HGBA1C in the last 168 hours.  TSH: No results for input(s): TSH in the last 168 hours.    Diagnostic Results:  Brain Imaging:   CT head w/o contrast 10-8-17 results:  1.  Postoperative change of left frontoparietal craniotomy with stable encephalomalacia of the left frontal lobe. No CT evidence of acute intracranial abnormality. Further evaluation and followup as clinically warranted.    2.  Hyperdensity intermixed with sinonasal opacity within the left maxillary sinus may represent inspissated secretions versus fungal colonization.      Cardiac Evaluation:   EKG/Telemetry: 10-8-17 unconfirmed results:  Normal sinus rhythm

## 2017-10-09 PROBLEM — I63.9 CEREBELLAR INFARCT: Status: ACTIVE | Noted: 2017-10-09

## 2017-10-09 LAB
ALBUMIN SERPL BCP-MCNC: 2.3 G/DL
ALP SERPL-CCNC: 74 U/L
ALT SERPL W/O P-5'-P-CCNC: 16 U/L
ANION GAP SERPL CALC-SCNC: 7 MMOL/L
AST SERPL-CCNC: 22 U/L
BASOPHILS # BLD AUTO: 0.01 K/UL
BASOPHILS NFR BLD: 0.1 %
BILIRUB SERPL-MCNC: 1.6 MG/DL
BUN SERPL-MCNC: 16 MG/DL
CALCIUM SERPL-MCNC: 7.7 MG/DL
CHLORIDE SERPL-SCNC: 115 MMOL/L
CO2 SERPL-SCNC: 22 MMOL/L
CREAT SERPL-MCNC: 1.2 MG/DL
DIASTOLIC DYSFUNCTION: NO
DIFFERENTIAL METHOD: ABNORMAL
EOSINOPHIL # BLD AUTO: 0.1 K/UL
EOSINOPHIL NFR BLD: 1.4 %
ERYTHROCYTE [DISTWIDTH] IN BLOOD BY AUTOMATED COUNT: 13.7 %
EST. GFR  (AFRICAN AMERICAN): 51.8 ML/MIN/1.73 M^2
EST. GFR  (NON AFRICAN AMERICAN): 44.9 ML/MIN/1.73 M^2
ESTIMATED PA SYSTOLIC PRESSURE: 25.28
GLOBAL PERICARDIAL EFFUSION: NORMAL
GLUCOSE SERPL-MCNC: 96 MG/DL
HCT VFR BLD AUTO: 34.8 %
HGB BLD-MCNC: 10.8 G/DL
LYMPHOCYTES # BLD AUTO: 1.7 K/UL
LYMPHOCYTES NFR BLD: 20.1 %
MCH RBC QN AUTO: 28.5 PG
MCHC RBC AUTO-ENTMCNC: 31 G/DL
MCV RBC AUTO: 92 FL
MITRAL VALVE REGURGITATION: NORMAL
MONOCYTES # BLD AUTO: 0.9 K/UL
MONOCYTES NFR BLD: 10.8 %
NEUTROPHILS # BLD AUTO: 5.8 K/UL
NEUTROPHILS NFR BLD: 67.4 %
PLATELET # BLD AUTO: 269 K/UL
PMV BLD AUTO: 9.5 FL
POCT GLUCOSE: 123 MG/DL (ref 70–110)
POTASSIUM SERPL-SCNC: 3.9 MMOL/L
PROT SERPL-MCNC: 5.5 G/DL
RBC # BLD AUTO: 3.79 M/UL
RETIRED EF AND QEF - SEE NOTES: 60 (ref 55–65)
SODIUM SERPL-SCNC: 144 MMOL/L
WBC # BLD AUTO: 8.65 K/UL

## 2017-10-09 PROCEDURE — 97530 THERAPEUTIC ACTIVITIES: CPT

## 2017-10-09 PROCEDURE — G8988 SELF CARE GOAL STATUS: HCPCS | Mod: CK

## 2017-10-09 PROCEDURE — 36415 COLL VENOUS BLD VENIPUNCTURE: CPT

## 2017-10-09 PROCEDURE — 92610 EVALUATE SWALLOWING FUNCTION: CPT

## 2017-10-09 PROCEDURE — G8979 MOBILITY GOAL STATUS: HCPCS | Mod: CJ

## 2017-10-09 PROCEDURE — 25000003 PHARM REV CODE 250: Performed by: PHYSICIAN ASSISTANT

## 2017-10-09 PROCEDURE — 80053 COMPREHEN METABOLIC PANEL: CPT

## 2017-10-09 PROCEDURE — G8978 MOBILITY CURRENT STATUS: HCPCS | Mod: CK

## 2017-10-09 PROCEDURE — 99222 1ST HOSP IP/OBS MODERATE 55: CPT | Mod: ,,, | Performed by: NURSE PRACTITIONER

## 2017-10-09 PROCEDURE — 97802 MEDICAL NUTRITION INDIV IN: CPT | Performed by: NUTRITIONIST

## 2017-10-09 PROCEDURE — 20600001 HC STEP DOWN PRIVATE ROOM

## 2017-10-09 PROCEDURE — 92523 SPEECH SOUND LANG COMPREHEN: CPT

## 2017-10-09 PROCEDURE — 97166 OT EVAL MOD COMPLEX 45 MIN: CPT

## 2017-10-09 PROCEDURE — G8987 SELF CARE CURRENT STATUS: HCPCS | Mod: CL

## 2017-10-09 PROCEDURE — G8996 SWALLOW CURRENT STATUS: HCPCS | Mod: CI

## 2017-10-09 PROCEDURE — 97161 PT EVAL LOW COMPLEX 20 MIN: CPT

## 2017-10-09 PROCEDURE — 93306 TTE W/DOPPLER COMPLETE: CPT | Mod: 26,,, | Performed by: INTERNAL MEDICINE

## 2017-10-09 PROCEDURE — 99233 SBSQ HOSP IP/OBS HIGH 50: CPT | Mod: GC,,, | Performed by: PSYCHIATRY & NEUROLOGY

## 2017-10-09 PROCEDURE — G8997 SWALLOW GOAL STATUS: HCPCS | Mod: CH

## 2017-10-09 PROCEDURE — 85025 COMPLETE CBC W/AUTO DIFF WBC: CPT

## 2017-10-09 PROCEDURE — A4216 STERILE WATER/SALINE, 10 ML: HCPCS | Performed by: STUDENT IN AN ORGANIZED HEALTH CARE EDUCATION/TRAINING PROGRAM

## 2017-10-09 PROCEDURE — 93306 TTE W/DOPPLER COMPLETE: CPT

## 2017-10-09 PROCEDURE — 25000003 PHARM REV CODE 250: Performed by: STUDENT IN AN ORGANIZED HEALTH CARE EDUCATION/TRAINING PROGRAM

## 2017-10-09 RX ADMIN — METOPROLOL SUCCINATE 50 MG: 50 TABLET, EXTENDED RELEASE ORAL at 08:10

## 2017-10-09 RX ADMIN — ERLOTINIB HYDROCHLORIDE 150 MG: 150 TABLET ORAL at 09:10

## 2017-10-09 RX ADMIN — LEVETIRACETAM 750 MG: 250 TABLET, FILM COATED ORAL at 08:10

## 2017-10-09 RX ADMIN — LEVETIRACETAM 750 MG: 250 TABLET, FILM COATED ORAL at 10:10

## 2017-10-09 RX ADMIN — AMITRIPTYLINE HYDROCHLORIDE 50 MG: 50 TABLET, FILM COATED ORAL at 10:10

## 2017-10-09 RX ADMIN — PANCRELIPASE 1 CAPSULE: 60000; 12000; 38000 CAPSULE, DELAYED RELEASE PELLETS ORAL at 08:10

## 2017-10-09 RX ADMIN — RIVAROXABAN 20 MG: 20 TABLET, FILM COATED ORAL at 05:10

## 2017-10-09 RX ADMIN — AMLODIPINE BESYLATE 5 MG: 5 TABLET ORAL at 08:10

## 2017-10-09 RX ADMIN — PANCRELIPASE 1 CAPSULE: 60000; 12000; 38000 CAPSULE, DELAYED RELEASE PELLETS ORAL at 12:10

## 2017-10-09 RX ADMIN — PANCRELIPASE 1 CAPSULE: 60000; 12000; 38000 CAPSULE, DELAYED RELEASE PELLETS ORAL at 05:10

## 2017-10-09 RX ADMIN — ATORVASTATIN CALCIUM 40 MG: 20 TABLET, FILM COATED ORAL at 08:10

## 2017-10-09 RX ADMIN — Medication 3 ML: at 06:10

## 2017-10-09 NOTE — SUBJECTIVE & OBJECTIVE
Neurologic Chief Complaint: Vertigo    Subjective:     Interval History: Patient is seen for follow-up neurological assessment and treatment recommendations: TAMMY. Pt. Reports generalized weakness with no any focal deficit.     HPI, Past Medical, Family, and Social History remains the same as documented in the initial encounter.     Review of Systems  Scheduled Meds:   amitriptyline  50 mg Oral QHS    amlodipine  5 mg Oral Daily    atorvastatin  40 mg Oral Daily    erlotinib  150 mg Oral Daily    levetiracetam  750 mg Oral BID    lipase-protease-amylase 12,000-38,000-60,000 units  1 capsule Oral TID WM    metoprolol succinate  50 mg Oral Daily    rivaroxaban  20 mg Oral Daily with dinner    sodium chloride 0.9%  3 mL Intravenous Q8H     Continuous Infusions:   sodium chloride 0.9%       PRN Meds:influenza, labetalol, lorazepam, ondansetron, promethazine, sodium chloride 0.9%    Objective:     Vital Signs (Most Recent):  Temp: 98.5 °F (36.9 °C) (10/09/17 0850)  Pulse: 69 (10/09/17 0850)  Resp: 16 (10/09/17 0850)  BP: 132/67 (10/09/17 0850)  SpO2: 95 % (10/09/17 0850)  BP Location: Right arm    Vital Signs Range (Last 24H):  Temp:  [98 °F (36.7 °C)-98.7 °F (37.1 °C)]   Pulse:  [65-77]   Resp:  [16-18]   BP: (132-159)/(60-77)   SpO2:  [95 %-99 %]   BP Location: Right arm    Physical Exam  General: Well developed, well nourished female in NAD  HEENT: Conjunctiva clear; Oropharynx clear  Neck: No JVD noted, Supple  CV: Normal S1, S2 with no murmurs,gallops,rubs  Resp: Lungs CTA Bilaterally, Unlabored  Abdomen: NTND, BS normoactive x4 quads, soft  Extrem: No cyanosis, clubbing, edema.  Skin: No rashes, lesions, ulcers  Neuro: motor strength in tact. No focal deficit. GCS 15   PSYCH: Oriented x3    Neurological Exam:   LOC: alert and follows requests  Language: No aphasia  Speech: No dysarthria  Orientation: Person, Place, Time  Memory: Recent memory intact, Remote memory intact, Age correct, Month  correct  Visual Fields (CN II): Full  EOM (CN III, IV, VI): Full/intact  Pupils (CN III, IV, VI): PERRL  Facial Movement (CN VII): symmetric facial expression  Gag Reflex (CN IX, X): normal/symmetric  Shoulder/Neck (CN XI): SCM-Left: Normal ; SCM-Right: Normal ; Shoulder Shrug: Normal/Symetric  Tongue (CN XII): to midline  Reflexes: flexor plantar responses bilaterally  Motor*: Arm Left:  Normal (5/5), Leg Left:   Normal (5/5), Arm Right:   Normal (5/5), Leg Right:   Normal (5/5)  Cerebellar*: Finger/Nose Abnormal - right lower  Sensation: intact to light touch, temperature and vibration  Tone: Arm-Left: normal; Leg-Left: normal; Arm-Right: normal; Leg-Right: normal    Stroke Scales   Interval: baseline (upon arrival/admit)  Level of Consciousness: 0 - alert  LOC Questions: 0 - answers both correctly  LOC Commands: 0 - performs both correctly  Best Gaze: 0 - normal  Visual: 0 - no visual loss  Facial Palsy: 0 - normal  Motor Left Arm: 0 - no drift  Motor Right Arm: 0 - no drift  Motor Left Le - no drift  Motor Right Le - no drift  Limb Ataxia: 1 - present in one limb  Sensory: 0 - normal  Best Language: 0 - no aphasia  Dysarthria: 0 - normal articulation  Extinction and Inattention: 0 - no neglect  NIH Stroke Scale Total: 1    Laboratory:  CMP:   Recent Labs  Lab 10/09/17  0500   CALCIUM 7.7*   ALBUMIN 2.3*   PROT 5.5*      K 3.9   CO2 22*   *   BUN 16   CREATININE 1.2   ALKPHOS 74   ALT 16   AST 22   BILITOT 1.6*     CBC:   Recent Labs  Lab 10/09/17  0500   WBC 8.65   RBC 3.79*   HGB 10.8*   HCT 34.8*      MCV 92   MCH 28.5   MCHC 31.0*     Lipid Panel:   Recent Labs  Lab 10/08/17  0619   CHOL 172   LDLCALC 83.4   HDL 76*   TRIG 63       Diagnostic Results:  I have personally reviewed: MRI Head. Date: 10/08/17  Findings: No enhancement within the right cerebellar signal abnormality compatible with acute infarction.    There is ill-defined subcentimeter nodular enhancement in the right  precentral gyrus which is nonspecific and may represent subacute age area of infarction however in light of history new cortical metastases cannot be excluded. Clinical correlation and consideration for correlation with CSF analysis and short-term followup recommended.    There is continued pachymeningeal enhancement underlying the craniotomy with continued somewhat nodular enhancement within overlying left frontal convexity overall reduced from prior. No evidence for new or increased enhancement in this region to suggest definite local worsening residual recurrent lesion.

## 2017-10-09 NOTE — PT/OT/SLP EVAL
Occupational Therapy  Evaluation/Treatment    Naty St   MRN: [id number]   Admitting Diagnosis: r/o stroke (suspected right cerebellar infarct)    OT Date of Treatment: 10/09/17   OT Start Time: 0555  OT Stop Time: 0622  OT Total Time (min): 27 min    Billable Minutes:  Evaluation 17  Therapeutic Activity 10    Diagnosis: r/o stroke (suspected right cerebellar infarct)    Past Medical History:   Diagnosis Date    Blood clot in vein 06/2016    Breast cancer 1994    Cataract     Hypertension     Pancreatic cancer 1998    Posterior capsular opacification, left eye     Psychiatric problem     Seizures 01/25/2016    Therapy       Past Surgical History:   Procedure Laterality Date    BONE BIOSPY  3/9/16    BRAIN SURGERY  1/12/16    tumor removal 1/12/16    BREAST LUMPECTOMY  1998    left    CATARACT EXTRACTION Bilateral 2004    yag OU    CHOLECYSTECTOMY  1998    COLONOSCOPY N/A 11/13/2015    Procedure: COLONOSCOPY;  Surgeon: Alex Carmona MD;  Location: [facility] ([address] [place] [address]);  Service: Endoscopy;  Laterality: N/A;  2 year f/u    cysto and right ureteral stent  4/27/12    ecoli  2010    removal of blockage, done throat, then through the liver.    HYSTERECTOMY  1974    KIDNEY STONE SURGERY  2010--laser    left eswl  6/20/12    OOPHORECTOMY  4/25/2012    Laparoscopic BSO, lysis of adhesions, cystoscopy greater than 35mins     WHIPPLE PROCEDURE W/ LAPAROSCOPY  1998       Referring physician: Suchow  Date referred to OT: 10/8  General Precautions: Standard, aspiration, fall  Orthopedic Precautions: N/A  Do you have any cultural, spiritual, Mandaen conflicts, given your current situation?: Congregational     Patient History:  Prior level of function:   Patient resides in [place] with  in one story home with 8 steps to enter, bilateral rail.  Patient is right handed.  PTA patient independent with ADLs including driving.  DME:  owns cane which patient reports not using.  Also owns a 3 in 1  "commode.  Hobbies:  Rastafari activities.  Roles/ Responsibilities:  wife, mother, grandmother, great grandmother, changes sheets on bed.  Son assist with household cleaning.   performs cooking.     Subjective:  Communicated with nurse prior to session.  Patient:  "Yesterday I couldn't move.  My vision is 95% okay.  I lost interested in all the things I use to do."  Pain/Comfort  Pain Rating 1: 0/10  Pain Rating Post-Intervention 1: 0/10    Objective:  Patient found with: peripheral IV  Son present.    Cognitive Exam:  Oriented to: Person, Place, Time and Situation  Follows Commands/attention: Follows one-step commands  Communication: dysarthria  Memory:  Further assessment needed  Safety awareness/insight to disability: impaired  Coping skills/emotional control: Appropriate to situation    Visual/perceptual:  Blurry vision; wears glasses    Physical Exam:  Postural examination/scapula alignment: Rounded shoulder  Skin integrity: Visible skin intact  Edema: None noted     Sensation:   Intact    Upper Extremity Range of Motion:  Right Upper Extremity: WNL  Left Upper Extremity: WNL    Upper Extremity Strength:  Right Upper Extremity: WFL  Left Upper Extremity: WFL    Fine motor coordination:   Impaired; right UE dysmetria noted    Functional Mobility:  Bed Mobility:  Rolling/Turning to Left: Supervision  Rolling/Turning Right: Supervision  Scooting/Bridging: Stand by Assistance  Supine to Sit: Stand by Assistance  Sit to Supine: Stand by Assistance    Transfers:  Sit <> Stand Assistance: Minimum Assistance  Sit <> Stand Assistive Device: No Assistive Device  Bed <> Chair Technique: Stand Pivot  Bed <> Chair Transfer Assistance: Moderate Assistance    Activities of Daily Living:  UE Dressing Level of Assistance: Minimum assistance (while seated EOB 2* weight shift to the left)  LE Dressing Level of Assistance: Moderate assistance (assistance for standing balance managing pants)  Grooming Position: " "Standing  Grooming Level of Assistance: Moderate assistance     Additional Treatment:   Patient/ Family education provided for stroke warning signs, prevention guidelines and personal risk factors.  Patient/ Family verbalizing understanding via teach back method.   Patient education provided on role of OT and need for rehab upon discharge.  Patient verbalizing understanding via teach back method. Patient and family instructed on need to call for assistance for toileting needs and when getting up.  Continued education, patient/ family training recommended.  Patient alert and oriented x 3; able to follow 4/4 one step commands.  Patient attentive and interactive throughout the session.  Patient able to identify 5/5 body parts.  Able to name 5/5 objects.  Able to sequence 7/7 days of the week and 12/12 months of the year.  Patient's functional status and disposition recommendation discussed with stroke team in daily rounds.  White board updated in patient's room.  OT asked if there were any other questions; patient/ family had no further questions.       AM-PAC 6 CLICK ADL  How much help from another person does this patient currently need?  1 = Unable, Total/Dependent Assistance  2 = A lot, Maximum/Moderate Assistance  3 = A little, Minimum/Contact Guard/Supervision  4 = None, Modified Early/Independent    Putting on and taking off regular lower body clothing? : 2  Bathing (including washing, rinsing, drying)?: 2  Toileting, which includes using toilet, bedpan, or urinal? : 2  Putting on and taking off regular upper body clothing?: 3  Taking care of personal grooming such as brushing teeth?: 3  Eating meals?: 3  Total Score: 15    AM-PAC Raw Score CMS "G-Code Modifier Level of Impairment Assistance   6 % Total / Unable   7 - 9 CM 80 - 100% Maximal Assist   10-14 CL 60 - 80% Moderate Assist   15 - 19 CK 40 - 60% Moderate Assist   20 - 22 CJ 20 - 40% Minimal Assist   23 CI 1-20% SBA / CGA   24 CH 0% " Independent/ Mod I       Patient left supine with all lines intact and call button in reach    Assessment:  Naty St is a 72 y.o. female with a medical diagnosis of  r/o stroke (suspected right cerebellar infarct) and presents with performance deficits of physical skills including impaired  balance, mobility, dexterity, fine motor coordination, gross motor coordination, and endurance; demonstrating performance deficits of cognitive skills including impaired problem solving, sequencing and memory all resulting in inability organizing occupational performance in a timely and safe manner.  These performance deficits have resulted in activity limitations including but not limited to:   transfers, ascending/ descending stairs, walking short and long distances, walking around obstacles, transitional movement patterns (kneeling, bending); eating, upper body dressing, lower body dressing, brushing teeth, toileting, bathing, carrying objects, writing, and driving.   Patient's role as mother, wife, grand mother and independent caretaker for self has been affected. Patient will benefit from skilled OT services to maximize level of independence with self-care skills and functional mobility.  Will benefit from rehab.    Pt evaluation falls under moderate complexity for evaluation coding due to identification of 3-5 performance deficits noted as stated above. Eval required Min/Mod assistance to complete on this date and detailed assessment(s) were utilized. Moreover, an expanded review of history and occupational profile obtained with additional review of cognitive, physical and psychosocial hx.     Rehab identified problem list/impairments: Rehab identified problem list/impairments: weakness, impaired endurance, impaired sensation, impaired self care skills, impaired functional mobilty, decreased coordination, impaired balance, gait instability, decreased upper extremity function, decreased lower extremity  function, decreased safety awareness, impaired fine motor, impaired coordination    Rehab potential is good.    Activity tolerance: Good    Discharge recommendations: Discharge Facility/Level Of Care Needs: rehabilitation facility     Barriers to discharge: Barriers to Discharge: Inaccessible home environment, Decreased caregiver support    Equipment recommendations: bath bench     GOALS:    Occupational Therapy Goals        Problem: Occupational Therapy Goal    Goal Priority Disciplines Outcome Interventions   Occupational Therapy Goal     OT, PT/OT     Description:  Goals set 10/9 to be addressed for 7 days with expiration date, 10/16:  Patient will increase functional independence with ADLs by performing:  Patient will demonstrate supine -sit with modified independence.   Not met  Patient will demonstrate stand pivot transfers with CGA.   Not met  Patient will demonstrate grooming while standing with CGA.   Not met  Patient will demonstrate upper body dressing with SBA while seated EOB.   Not met  Patient will demonstrate lower body dressing with min assist while seated EOB.   Not met  Patient will demonstrate toileting with CGA.   Not met  Patient will demonstrate bathing while seated EOB with CGA.   Not met  Patient's family / caregiver will demonstrate independence and safety with assisting patient with self-care skills and functional mobility.     Not met  Patient and/or patient's family will verbalize understanding of stroke prevention guidelines, personal risk factors and stroke warning signs via teachback method.  Not met                           PLAN:  Patient to be seen 6 x/week to address the above listed problems via self-care/home management, therapeutic exercises, neuromuscular re-education, cognitive retraining, sensory integration, therapeutic activities  Plan of Care expires: 11/06/17  Plan of Care reviewed with: patient, son    OT G-codes  Functional Assessment Tool Used: FIM  Score:  3  Functional Limitation: Self care (grooming)  Self Care Current Status (): CL  Self Care Goal Status (): LEOBARDO Cedeno  10/09/2017

## 2017-10-09 NOTE — PLAN OF CARE
10/09/17 1201   Discharge Assessment   Assessment Type Discharge Planning Assessment   Confirmed/corrected address and phone number on facesheet? Yes   Assessment information obtained from? Patient;Caregiver   Expected Length of Stay (days) 2   Communicated expected length of stay with patient/caregiver yes   Prior to hospitilization cognitive status: Alert/Oriented   Prior to hospitalization functional status: Independent   Current cognitive status: Alert/Oriented   Current Functional Status: Needs Assistance   Facility Arrived From: Home   Lives With spouse   Able to Return to Prior Arrangements unable to determine at this time (comments)   Is patient able to care for self after discharge? Unable to determine at this time (comments)   Who are your caregiver(s) and their phone number(s)? Javier St () [phone], Basil Lopez (Son) [phone]    Patient's perception of discharge disposition home or selfcare   Readmission Within The Last 30 Days no previous admission in last 30 days   Patient currently being followed by outpatient case management? No   Patient currently receives any other outside agency services? No   Equipment Currently Used at Home 3-in-1 commode   Do you have any problems affording any of your prescribed medications? No   Is the patient taking medications as prescribed? yes   Does the patient have transportation home? Yes   Transportation Available car;family or friend will provide   Does the patient receive services at the [facility]? No   Discharge Plan A Skilled Nursing Facility   Discharge Plan B Home with family   Patient/Family In Agreement With Plan yes       PCP:Olvin Mars MD      [facility] [address] [facility]  [address] [facility] [address]  Phone: [phone] Fax: [phone]    [facility] - [place], [place] - [address] [place] [address]  Phone: [phone] Fax:  [phone]    [facility] - [place], [place] - [address]  [address] [place] [address]  Phone: [phone] Fax: [phone]      Extended Emergency Contact Information  Primary Emergency Contact: Troy St  Address: [address] [place] [address] [facility]  Home Phone: [phone]  Mobile Phone: [phone]  Relation: Spouse  Secondary Emergency Contact: Basil Lopez   [place]  Mobile Phone: [phone]  Relation: Son    Payor: HU[employer] MANAGED MEDICARE / Plan: HU[employer]NA TOTAL CARE ADVANTAGE / Product Type: Medicare Advantage /     Patient was sitting up in bed eating her breakfast with , son and another visitor at bedside. Cm discussed the need for PT eval to determine where the patient would dc. CM will continue to follow.

## 2017-10-09 NOTE — PT/OT/SLP EVAL
Physical Therapy  Evaluation    Naty St   MRN: [id number]   Admitting Diagnosis: Cerebellar infarct    PT Received On: 10/09/17  PT Start Time: 1135     PT Stop Time: 1200    PT Total Time (min): 25 min       Billable Minutes:  Evaluation 17 Therapeutic Activity 8    Diagnosis: Cerebellar infarct    Comorbidities and personal factors that affect the PT plan of care or the patient's ability to participate or progress with therapy:  1. Meningioma  2. neoplasm L lower lobe  3. Bone CA  4. Adjustment disorder    Clinical Presentation: stable and/or uncomplicated    Level of Complexity:   Low Complexity  · No personal factors or comorbidities that impact the plan of care  · Examination addressing 1-2 body structures and functions, activity limitations, and/or participation restrictions  · Clinical presentation with stable or uncomplicated characteristics    Past Medical History:   Diagnosis Date    Blood clot in vein 06/2016    Breast cancer 1994    Cataract     Hypertension     Pancreatic cancer 1998    Posterior capsular opacification, left eye     Psychiatric problem     Seizures 01/25/2016    Therapy       Past Surgical History:   Procedure Laterality Date    BONE BIOSPY  3/9/16    BRAIN SURGERY  1/12/16    tumor removal 1/12/16    BREAST LUMPECTOMY  1998    left    CATARACT EXTRACTION Bilateral 2004    yag OU    CHOLECYSTECTOMY  1998    COLONOSCOPY N/A 11/13/2015    Procedure: COLONOSCOPY;  Surgeon: Alex Carmona MD;  Location: [facility] ([address] [place] [address]);  Service: Endoscopy;  Laterality: N/A;  2 year f/u    cysto and right ureteral stent  4/27/12    ecoli  2010    removal of blockage, done throat, then through the liver.    HYSTERECTOMY  1974    KIDNEY STONE SURGERY  2010--laser    left eswl  6/20/12    OOPHORECTOMY  4/25/2012    Laparoscopic BSO, lysis of adhesions, cystoscopy greater than 35mins     WHIPPLE PROCEDURE W/ LAPAROSCOPY  1998       Referring physician: Esther Arriaza,  "MD  Date referred to PT: 10/8/2017    General Precautions: Standard, aspiration, fall    Patient History:  Living Environment Comment: Pt lives with her  in [place] in a 1 story home with 8 steps to enter.  She was completely independent prior to admit and did not use DME.   Equipment Currently Used at Home:  (owns BSC and cane)    Subjective:  Communicated with RN prior to session.  "I can't walk.  I tried earlier and I can't move my feet.   Chief Complaint: decreased balance   Patient goals: return to PLOF    Pain/Comfort  Pain Rating 1: 0/10  Pain Rating Post-Intervention 1: 0/10    Objective:   Patient found with: telemetry   Patient found supine in bed in NAD with  and son at bedside.  She agreed to participate in therapy.      EXAMINATION    Cognitive Function:  Oriented to: Person, Place, Time and Situation  Level of Alertness: awake and atentive  Follows Commands/attention: Follows multistep  commands  Communication: clear/fluent  Safety awareness/insight to disability: intact    Musculoskeletal System  Lower Extremities:  Range of Motion:  Right Lower Extremity: WFL  Left Lower Extremity: WFL    Strength:  Right Lower Extremity: grossly 5/5 in all major muscle groups   Left Lower Extremity: grossly 5/5 in all major muscle groups     Muscular Tone: normal      Integumentary System:  Skin integrity: Visible skin intact    Cardiopulmonary System:  Edema: None noted     Neuromuscular System:  Sensation:   Light Touch (LT): NT    Coordination: impaired  · Finger to thumb opposition slow on R  · Finger to nose impaired R, dysmetria  · Heel to shin impaired R    Balance:   Static Sit: FAIR-: Maintains without assist but inconsistent ; retropulsion, uses UEs to maintain trunk control   Dynamic Sit: FAIR+: Maintains balance through MINIMAL excursions of active trunk motion;   Static Stand: POOR: Needs MODERATE assist to maintain; retropulsion   Dynamic stand: POOR: N/A;     Posture and gross " symmetry: Rounded shoulder and Head forward     FUNCTIONAL MOBILITY ASSESSMENT:  Bed Mobility:  Rolling/Turning R: stand by assist   Rolling/Turning L: stand by assist   Supine to sit: stand by assist   Sit to supine: NT   Scooting EOB: min assist      Transfers:  Sit to stand transfer: max assist with significant retropulsion   Bed to chair transfer: moderate assist with RW, retropulsion, therapist weighting down the front of the walker for safety    Gait:   Gait x 10 feet with RW and moderate assist, significant gait deviations present  · Pt demonstrates retropulsion and LE ataxia.    · Therapist provided counterweight on the front of the walker to improve balance.  Patient unable to take steps due to posterior LOB without weighted walker.      THERAPEUTIC ACTIVITIES AND EXERCISES:  Therapist educated patient on the following:  · Role of PT, POC  · Mobilization with therapist  · Transfer training  · Progression of therapy  · Assistance with all transfers  · Gait only with therapy.     Transfer training  Therapist educated patient on retropulsion and the need for anterior weight shift to improve transfers  Sit to stand repeated 3x from EOB mod assist with RW (front of RW weighted by therapist.)  Therapist educated patient and family on technique of counter weighting RW to improve safety with transfers at this time.      AM-PAC 6 CLICK MOBILITY  How much help from another person does this patient currently need?   1 = Unable, Total/Dependent Assistance  2 = A lot, Maximum/Moderate Assistance  3 = A little, Minimum/Contact Guard/Supervision  4 = None, Modified [place]/Independent    Turning over in bed (including adjusting bedclothes, sheets and blankets)?: 4  Sitting down on and standing up from a chair with arms (e.g., wheelchair, bedside commode, etc.): 2  Moving from lying on back to sitting on the side of the bed?: 4  Moving to and from a bed to a chair (including a wheelchair)?: 2  Need to walk in hospital  room?: 2  Climbing 3-5 steps with a railing?: 2  Total Score: 16     AM-PAC Raw Score CMS G-Code Modifier Level of Impairment Assistance   6 % Total / Unable   7 - 9 CM 80 - 100% Maximal Assist   10 - 14 CL 60 - 80% Moderate Assist   15 - 19 CK 40 - 60% Moderate Assist   20 - 22 CJ 20 - 40% Minimal Assist   23 CI 1-20% SBA / CGA   24 CH 0% Independent/ Mod I     Patient left up in chair with all lines intact, call button in reach and family  present.    Assessment:   Naty St is a 72 y.o. female with a medical diagnosis of Cerebellar infarct.  Patient was independent prior to admit.  She now demonstrates significant deficits with sitting balance, standing balance, dynamic postural control, coordination and functional mobility.  She requires moderate to maximal assistance with all OOB mobility d/t significant balance/postural control deficits.  Patient needs additional skilled PT services to improve safety, independence and functional mobility.     Rehab identified problem list/impairments: Rehab identified problem list/impairments: impaired self care skills, impaired balance, impaired functional mobilty, decreased upper extremity function, impaired coordination, gait instability, decreased lower extremity function    Rehab potential is good.    Activity tolerance: Good    Discharge recommendations: Discharge Facility/Level Of Care Needs: rehabilitation facility     Barriers to discharge: Barriers to Discharge: Decreased caregiver support, Inaccessible home environment (8 steps to enter)    Equipment recommendations: Equipment Needed After Discharge:  (TBD)     GOALS:    Physical Therapy Goals        Problem: Physical Therapy Goal    Goal Priority Disciplines Outcome Goal Variances Interventions   Physical Therapy Goal     PT/OT, PT Ongoing (interventions implemented as appropriate)     Description:  Goals to be met by: 10/20/2017     Patient will increase functional independence with mobility  by performin. Sit to stand transfer with Contact Guard Assistance using RW  2. Bed to chair transfer with Contact Guard Assistance using Rolling Walker  3. Gait  x 100 feet with Minimal Assistance using Rolling Walker.   4. Ascend/descend 8 stairs with bilateral Handrails using min assist to access home environment safely.   5. Stand for 10 minutes with Contact Guard Assistance and no posterior LOB using Rolling Walker while performing dynamic UE tasks                      PLAN:    Patient to be seen 6 x/week to address the above listed problems via gait training, therapeutic activities, therapeutic exercises, neuromuscular re-education  Plan of Care expires:    Plan of Care reviewed with: patient, spouse, son          Ivett Nance, PT  10/09/2017

## 2017-10-09 NOTE — ASSESSMENT & PLAN NOTE
-- Pt. Is improving.   -- MRI Brain With Contrast 10/08/17   -No enhancement within the right cerebellar signal abnormality compatible with acute infarction.    -There is ill-defined subcentimeter nodular enhancement in the right precentral gyrus which is nonspecific and may represent subacute age area of infarction however in light of history new cortical metastases cannot be excluded. Clinical correlation and consideration for correlation with CSF analysis and short-term followup recommended.    -There is continued pachymeningeal enhancement underlying the craniotomy with continued somewhat nodular enhancement within overlying left frontal convexity overall reduced from prior. No evidence for new or increased enhancement in this region to suggest definite local worsening residual recurrent lesion.    -- Statins for secondary stroke prevention and hyperlipidemia, if present: Atorvastatin- 40 mg oral daily.  --Aggressive risk factor modification: Hypertension  -- Rehab Efforts: Physical Therapy and Occupational Therapy.  --  Pt. On Xarelto

## 2017-10-09 NOTE — CONSULTS
Inpatient consult to Physical Medicine Rehab  Consult performed by: LIZ WILLIAM  Consult ordered by: RASHMI RUIZ  Reason for consult: assess rehab needs        Reviewed patient history and current admission.  Rehab team following.  Full consult to follow.    RUBIN Lopez, FNP-C  Physical Medicine & Rehabilitation   10/09/2017  Spectralink: [id number]

## 2017-10-09 NOTE — PT/OT/SLP EVAL
Speech Language Pathology Evaluation    Naty St   MRN: [id number]   Admitting Diagnosis: cerebellar stroke vs. Vertigo    Diet recommendations: Solid Diet Level: Regular  Liquid Diet Level: Thin Feed only when awake/alert, HOB to 90 degrees, Small bites/sips, 1 bite/sip at a time, Meds whole 1 at a time and Avoid talking while eating    SLP Treatment Date: 10/09/17  Speech Start Time: 0732     Speech Stop Time: 0803     Speech Total (min): 31 min       TREATMENT BILLABLE MINUTES:  Eval 20  and Eval Swallow and Oral Function 11    Diagnosis: stroke work-up    Past Medical History:   Diagnosis Date    Blood clot in vein 06/2016    Breast cancer 1994    Cataract     Hypertension     Pancreatic cancer 1998    Posterior capsular opacification, left eye     Psychiatric problem     Seizures 01/25/2016    Therapy      Past Surgical History:   Procedure Laterality Date    BONE BIOSPY  3/9/16    BRAIN SURGERY  1/12/16    tumor removal 1/12/16    BREAST LUMPECTOMY  1998    left    CATARACT EXTRACTION Bilateral 2004    yag OU    CHOLECYSTECTOMY  1998    COLONOSCOPY N/A 11/13/2015    Procedure: COLONOSCOPY;  Surgeon: Alex Carmona MD;  Location: [facility] ([address] [place] [address]);  Service: Endoscopy;  Laterality: N/A;  2 year f/u    cysto and right ureteral stent  4/27/12    ecoli  2010    removal of blockage, done throat, then through the liver.    HYSTERECTOMY  1974    KIDNEY STONE SURGERY  2010--laser    left eswl  6/20/12    OOPHORECTOMY  4/25/2012    Laparoscopic BSO, lysis of adhesions, cystoscopy greater than 35mins     WHIPPLE PROCEDURE W/ LAPAROSCOPY  1998       Has the patient been evaluated by SLP for swallowing? : Yes  Keep patient NPO?: No   General Precautions: Standard, fall, aspiration          Social Hx: Patient lives with spouse, indpt with ADl's prior.  Manages medications and bills though dies not cook or drive.  Pt reports some progressive memory difficulties recently, saw a  "neuropsychologist last week but does not have the results yet.     Prior diet: reg/thin.    Subjective:  "This speech stuff is new"  Patient goals: to improve speech.    Pain/Comfort  Pain Rating 1: 0/10  Pain Rating Post-Intervention 1: 0/10    Objective:   Patient found with: peripheral IV    Oral Musculature Evaluation  Oral Musculature: WFL, mild action tremor with oral mech  Dentition:  (upper/lower dentures not present)  Mucosal Quality: good  Mandibular Strength and Mobility: WFL  Oral Labial Strength and Mobility: WFL  Lingual Strength and Mobility: WFL  Buccal Strength and Mobility: WFL  Volitional Cough: WFL  Volitional Swallow: decreased rise  Voice Prior to PO Intake: clear, dysarthria     Cognitive Status:  Behavioral Observations: alert and cooperative-  Memory and Orientation: Pt Ox4 indptly, immediate recall up to 3 words and 5 digits WFL, difficulty with 4-5 word sets noted. 1/3 unassociated words recalled after delay  Attention: WFL.  Problem Solving: Pt able to complete simple verbal problem solving with 100% accy though responses were concrete with limited expansion of response despite assist.  100% accy with convergent categorization, 4 word set sequencing (rep x1) and compare/contrast.    Pragmatics: WFL  Executive Function: mental flexibility    Language: appears WFL for simple information     Auditory Comprehension: 100% accy with complex y/n and commands    Verbal Expression: Pt able to express basic thoughts/needs.  Some decreased word fluency noted with pic description and divergent categorization task (8 items stated in 1 min 15-20 is WFL)    Motor Speech: dysarthria.  Decreased coordination wtih decreased intelligibility in connected speech    Voice: WFL, perturbation with sustained phonation    Reading: tba    Writing: tba    Visual-Spatial: tba    Bedside Swallow Eval:  Consistencies Assessed: thin, puree, solids  Oral Phase: WFL-prolonged mastication with cracker 2/2 dentures not " present.  Spouse to bring to hospital today.  Pharyngeal Phase: coughing/choking x1 with cyclical sips from cup  No overt s/s aspiration with thin via single sip from cup and straw    Additional Treatment:      FIM:  Social Interaction: 5 Supervision--The patient requires supervision (e.g., monitoring, verbal control, cueing, or coaxing) only under stressful or unfamiliar conditions, but less than 10% of the time.  The patient may require encouragement to initiate participation.   Problem Solvin Modified [place]--In most situations, the patient recognizes a present problem, and with only mild difficulty makes appropriate decisions, initiates and carries out a sequence of steps to solve complex problems, or requires more than a   Comprehension: 6 Modified [place]--In most situations, the patient understands readily or with only mild dificulty complex or abstract directions and conversation.  The patient does not require prompting, though (s)he may require a hearing or visual aid, other x   Expression: 4 Minimal Prompting--The patient expresses basic daily needs and ideas 75 to 90% of the time.   Memory: 4 Minimal Prompting--The patient recognizes and remembers 75 to 90% of the time.     Assessment:  Naty St is a 72 y.o. female with a SLP diagnosis of Dysarthria and mild Cognitive-Linguistic Impairment. She present with decreased word fluency and articulatory precision in connected discourse.    Do you have any cultural, spiritual, Muslim conflicts, given your current situation?: none    Discharge recommendations: Discharge Facility/Level Of Care Needs: rehabilitation facility, outpatient speech therapy (pending PT/OT recs)     Goals:    SLP Goals        Problem: SLP Goal    Goal Priority Disciplines Outcome   SLP Goal     SLP Ongoing (interventions implemented as appropriate)   Description:  Speech Language Pathology Goals  Goals expected to be met by 10/16  1. Pt will tolerate regular  diet with thin liquids without overt s/s aspiration.  2. Pt will recall 3/3 simple speech strategies with min cues.  3. Pt will implement speech strategies at sentence level with min cues and 80% intelligibility.  4. Pt will complete diadochokinetic tasks with min cues and fair ability.  5. Pt will name 10 items per concrete cat with min cues to improve word fluency.  6. Pt will utilize simple memory strategies with min cues to complete immediate recall tasks with 80% accy.  7. Pt will complete assessment of reading, writing, visual spatial skills to determine need for tx.                          Plan:   Patient to be seen Therapy Frequency: 5 x/week   Plan of Care expires: 11/08/17  Plan of Care reviewed with: patient, son  SLP Follow-up?: Yes         SLP G-Codes  Functional Assessment Tool Used: noms  Score: 6  Functional Limitations: Swallowing  Swallow Current Status (): CI  Swallow Goal Status (): CAITLIN Bartlett, CCC-SLP  10/09/2017

## 2017-10-09 NOTE — PROGRESS NOTES
[facility]  Vascular Neurology  Comprehensive Stroke Center  Progress Note    Assessment/Plan:     Patient is a 72 y.o. year old female with:    Right Cerebellar infarct    -- Pt. Is improving.   -- MRI Brain With Contrast 10/08/17   -No enhancement within the right cerebellar signal abnormality compatible with acute infarction.    -There is ill-defined subcentimeter nodular enhancement in the right precentral gyrus which is nonspecific and may represent subacute age area of infarction however in light of history new cortical metastases cannot be excluded. Clinical correlation and consideration for correlation with CSF analysis and short-term followup recommended.    -There is continued pachymeningeal enhancement underlying the craniotomy with continued somewhat nodular enhancement within overlying left frontal convexity overall reduced from prior. No evidence for new or increased enhancement in this region to suggest definite local worsening residual recurrent lesion.    -- Statins for secondary stroke prevention and hyperlipidemia, if present: Atorvastatin- 40 mg oral daily.  --Aggressive risk factor modification: Hypertension  -- Rehab Efforts: Physical Therapy and Occupational Therapy.  --  Pt. On Xarelto          Neurologic Chief Complaint: Vertigo    Subjective:     Interval History: Patient is seen for follow-up neurological assessment and treatment recommendations: JACKELINEEON. Pt. Reports generalized weakness with no any focal deficit.     HPI, Past Medical, Family, and Social History remains the same as documented in the initial encounter.     Review of Systems  Scheduled Meds:   amitriptyline  50 mg Oral QHS    amlodipine  5 mg Oral Daily    atorvastatin  40 mg Oral Daily    erlotinib  150 mg Oral Daily    levetiracetam  750 mg Oral BID    lipase-protease-amylase 12,000-38,000-60,000 units  1 capsule Oral TID WM    metoprolol succinate  50 mg Oral Daily    rivaroxaban  20 mg Oral  Daily with dinner    sodium chloride 0.9%  3 mL Intravenous Q8H     Continuous Infusions:   sodium chloride 0.9%       PRN Meds:influenza, labetalol, lorazepam, ondansetron, promethazine, sodium chloride 0.9%    Objective:     Vital Signs (Most Recent):  Temp: 98.5 °F (36.9 °C) (10/09/17 0850)  Pulse: 69 (10/09/17 0850)  Resp: 16 (10/09/17 0850)  BP: 132/67 (10/09/17 0850)  SpO2: 95 % (10/09/17 0850)  BP Location: Right arm    Vital Signs Range (Last 24H):  Temp:  [98 °F (36.7 °C)-98.7 °F (37.1 °C)]   Pulse:  [65-77]   Resp:  [16-18]   BP: (132-159)/(60-77)   SpO2:  [95 %-99 %]   BP Location: Right arm    Physical Exam  General: Well developed, well nourished female in NAD  HEENT: Conjunctiva clear; Oropharynx clear  Neck: No JVD noted, Supple  CV: Normal S1, S2 with no murmurs,gallops,rubs  Resp: Lungs CTA Bilaterally, Unlabored  Abdomen: NTND, BS normoactive x4 quads, soft  Extrem: No cyanosis, clubbing, edema.  Skin: No rashes, lesions, ulcers  Neuro: motor strength in tact. No focal deficit. GCS 15   PSYCH: Oriented x3    Neurological Exam:   LOC: alert and follows requests  Language: No aphasia  Speech: No dysarthria  Orientation: Person, Place, Time  Memory: Recent memory intact, Remote memory intact, Age correct, Month correct  Visual Fields (CN II): Full  EOM (CN III, IV, VI): Full/intact  Pupils (CN III, IV, VI): PERRL  Facial Movement (CN VII): symmetric facial expression  Gag Reflex (CN IX, X): normal/symmetric  Shoulder/Neck (CN XI): SCM-Left: Normal ; SCM-Right: Normal ; Shoulder Shrug: Normal/Symetric  Tongue (CN XII): to midline  Reflexes: flexor plantar responses bilaterally  Motor*: Arm Left:  Normal (5/5), Leg Left:   Normal (5/5), Arm Right:   Normal (5/5), Leg Right:   Normal (5/5)  Cerebellar*: Finger/Nose Abnormal - right lower  Sensation: intact to light touch, temperature and vibration  Tone: Arm-Left: normal; Leg-Left: normal; Arm-Right: normal; Leg-Right: normal    Stroke Scales    Interval: baseline (upon arrival/admit)  Level of Consciousness: 0 - alert  LOC Questions: 0 - answers both correctly  LOC Commands: 0 - performs both correctly  Best Gaze: 0 - normal  Visual: 0 - no visual loss  Facial Palsy: 0 - normal  Motor Left Arm: 0 - no drift  Motor Right Arm: 0 - no drift  Motor Left Le - no drift  Motor Right Le - no drift  Limb Ataxia: 1 - present in one limb  Sensory: 0 - normal  Best Language: 0 - no aphasia  Dysarthria: 0 - normal articulation  Extinction and Inattention: 0 - no neglect  NIH Stroke Scale Total: 1    Laboratory:  CMP:   Recent Labs  Lab 10/09/17  0500   CALCIUM 7.7*   ALBUMIN 2.3*   PROT 5.5*      K 3.9   CO2 22*   *   BUN 16   CREATININE 1.2   ALKPHOS 74   ALT 16   AST 22   BILITOT 1.6*     CBC:   Recent Labs  Lab 10/09/17  0500   WBC 8.65   RBC 3.79*   HGB 10.8*   HCT 34.8*      MCV 92   MCH 28.5   MCHC 31.0*     Lipid Panel:   Recent Labs  Lab 10/08/17  0619   CHOL 172   LDLCALC 83.4   HDL 76*   TRIG 63       Diagnostic Results:  I have personally reviewed: MRI Head. Date: 10/08/17  Findings: No enhancement within the right cerebellar signal abnormality compatible with acute infarction.    There is ill-defined subcentimeter nodular enhancement in the right precentral gyrus which is nonspecific and may represent subacute age area of infarction however in light of history new cortical metastases cannot be excluded. Clinical correlation and consideration for correlation with CSF analysis and short-term followup recommended.    There is continued pachymeningeal enhancement underlying the craniotomy with continued somewhat nodular enhancement within overlying left frontal convexity overall reduced from prior. No evidence for new or increased enhancement in this region to suggest definite local worsening residual recurrent lesion.      Mayra Zacarias MD  Comprehensive Stroke Center  Department of Vascular Neurology   [facility]

## 2017-10-09 NOTE — PLAN OF CARE
10/09/2017      Naty St  [address] [address]          [facility] Medicine Dept.  [facility] [address] [facility] [id number] [phone] after hours  [phone] fax Naty St has been hospitalized at the [facility] since 10/8/2017.  Her son Mr. Basil Lopez  Was with her during the admission (10/08/17-10/09/17)    Please contact me if you have any questions.                  __________________________  Mayra Zacarias MD  10/09/2017

## 2017-10-09 NOTE — PLAN OF CARE
Problem: Patient Care Overview  Goal: Plan of Care Review  Outcome: Ongoing (interventions implemented as appropriate)  POC reviewed with pt, , and son; understanding verbalized. Pt developed slurred speech in the middle of the night; stated she was just sleepy. Alerted stroke team. No other acute neuro changes noted. Vital signs stable. Pt able to use bedside commode with assist x 2. No skin breakdown.

## 2017-10-09 NOTE — HOSPITAL COURSE
10/9/17: Evaluated by therapy.  Bed mobility SV-SBA.  Sit to stand Yamil and transfers ModA.  Ambulated 10ft Yamil & RW with significant retropulsion and LE ataxia.  UBD Yamil and LBD ModA.  10/10/17: Participating with therapy.  Bed mobility SV-SBA.  Sit to stand Yamil-ModA & RW and transfers.  Ambulated 55 ft Mod-MaxA & rw.  UBD SBA and LBD ModA.

## 2017-10-09 NOTE — PLAN OF CARE
Problem: Patient Care Overview  Goal: Plan of Care Review  POC reviewed with pt and family; verbalized understanding. AAOx4. VSS. No acute changes. Safety precautions maintained.

## 2017-10-09 NOTE — ASSESSMENT & PLAN NOTE
-possible area of nodular enhancement in R precentral gyrus that may represent subacute infarct per Radiology     Functional status: see hospital course  Cognitive/Speech/Language status:  Dysarthria and mild Cognitive-Linguistic Impairment. She present with decreased word fluency and articulatory precision in connected discourse.  Nutrition/Swallow Status:  Passed bedside swallow evaluation.  SLP recommended regular diet and thin liquids.    Recommendations  -  Encourage mobility, OOB in chair at least 3 hours per day, and early ambulation as appropriate   -  PT/OT evaluate and treat  -  SLP speech and cognitive evaluate and treat  -  Monitor sleep disturbances and establish consistent sleep-wake cycle  -  Monitor for bowel and bladder dysfunction  -  Monitor for shoulder pain and subluxation  -  Monitor for spasticity  -  Monitor for and prevent skin breakdown and pressure ulcers  · Early mobility, repositioning/weight shifting every 20-30 minutes when sitting, turn patient every 2 hours, proper mattress/overlay and chair cushioning, pressure relief/heel protector boots  -  DVT prophylaxis:  SCDs  -  Reviewed discharge options (IP rehab, SNF, HH therapy, and OP therapy)

## 2017-10-09 NOTE — NURSING
Pt had new onset of slurred speech. No other neuro changes. Vital signs stable. Blood glucose 123. Liscum NP with stroke made aware. No new orders at this time.

## 2017-10-09 NOTE — PLAN OF CARE
1:20 PM  SW spoke with patient, spouse, and son at bedside to discuss discharge plan of care for inpatient rehab.SW provided patient with list of rehab in insurance network. Pt would like to go to [facility] upon discharge. Family states that alternative options will be discussed if patient is unable to go to [facility]. SW will update pt and family with additional information. SW will continue to follow.    Mana Ortiz LMSW  [facility]  Ext. [phone]

## 2017-10-09 NOTE — PROGRESS NOTES
Son said pt take Marinol before breakfast and dinner for her appetite. Med is not ordered. Notified stroke team. They said they will look into it.

## 2017-10-09 NOTE — SUBJECTIVE & OBJECTIVE
"Past Medical History:   Diagnosis Date    Blood clot in vein 06/2016    Breast cancer 1994    Cataract     Hypertension     Pancreatic cancer 1998    Posterior capsular opacification, left eye     Psychiatric problem     Seizures 01/25/2016    Therapy      Past Surgical History:   Procedure Laterality Date    BONE BIOSPY  3/9/16    BRAIN SURGERY  1/12/16    tumor removal 1/12/16    BREAST LUMPECTOMY  1998    left    CATARACT EXTRACTION Bilateral 2004    yag OU    CHOLECYSTECTOMY  1998    COLONOSCOPY N/A 11/13/2015    Procedure: COLONOSCOPY;  Surgeon: Alex Carmona MD;  Location: [facility] ([address] [place] [address]);  Service: Endoscopy;  Laterality: N/A;  2 year f/u    cysto and right ureteral stent  4/27/12    ecoli  2010    removal of blockage, done throat, then through the liver.    HYSTERECTOMY  1974    KIDNEY STONE SURGERY  2010--laser    left eswl  6/20/12    OOPHORECTOMY  4/25/2012    Laparoscopic BSO, lysis of adhesions, cystoscopy greater than 35mins     WHIPPLE PROCEDURE W/ LAPAROSCOPY  1998     Review of patient's allergies indicates:   Allergen Reactions    Tobradex [tobramycin-dexamethasone] Swelling    Iodinated contrast- oral and iv dye Hives    Morphine Other (See Comments)     "shaking" and tremors    Latex Rash    Phenytoin sodium extended Other (See Comments) and Rash       Scheduled Medications:    amitriptyline  50 mg Oral QHS    amlodipine  5 mg Oral Daily    atorvastatin  40 mg Oral Daily    erlotinib  150 mg Oral Daily    levetiracetam  750 mg Oral BID    lipase-protease-amylase 12,000-38,000-60,000 units  1 capsule Oral TID WM    metoprolol succinate  50 mg Oral Daily    rivaroxaban  20 mg Oral Daily with dinner    sodium chloride 0.9%  3 mL Intravenous Q8H       PRN Medications: influenza, labetalol, lorazepam, ondansetron, promethazine, sodium chloride 0.9%    Family History     Problem Relation (Age of Onset)    Breast cancer Mother    Leukemia Paternal Grandfather "    Lung cancer Mother    Stomach cancer Paternal Grandmother        Social History Main Topics    Smoking status: Former Smoker     Packs/day: 0.25     Years: 0.50     Quit date: 9/26/1961    Smokeless tobacco: Never Used    Alcohol use No    Drug use: No    Sexual activity: Yes     Partners: Male     Review of Systems   Constitutional: Negative for chills, fatigue and fever.   HENT: Negative for trouble swallowing and voice change.    Eyes: Negative for photophobia and visual disturbance.   Respiratory: Negative for cough, shortness of breath and wheezing.    Cardiovascular: Negative for chest pain and palpitations.   Gastrointestinal: Negative for abdominal distention and nausea.   Genitourinary: Negative for difficulty urinating and flank pain.   Musculoskeletal: Positive for gait problem. Negative for arthralgias.   Skin: Negative for color change and rash.   Neurological: Positive for speech difficulty and weakness. Negative for facial asymmetry, numbness and headaches.   Psychiatric/Behavioral: Negative for agitation and confusion.     Objective:     Vital Signs (Most Recent):  Temp: 99.6 °F (37.6 °C) (10/09/17 1054)  Pulse: 68 (10/09/17 1100)  Resp: 18 (10/09/17 1054)  BP: 130/60 (10/09/17 1054)  SpO2: 98 % (10/09/17 1054)    Vital Signs (24h Range):  Temp:  [98 °F (36.7 °C)-99.6 °F (37.6 °C)] 99.6 °F (37.6 °C)  Pulse:  [65-77] 68  Resp:  [16-18] 18  SpO2:  [95 %-99 %] 98 %  BP: (130-159)/(60-77) 130/60     Body mass index is 22.94 kg/m².    Physical Exam   Constitutional: She appears well-developed and well-nourished.   HENT:   Head: Normocephalic and atraumatic.   Eyes: EOM are normal. Pupils are equal, round, and reactive to light.   Neck: Normal range of motion.   Cardiovascular: Normal rate and regular rhythm.    Pulmonary/Chest: Effort normal. No respiratory distress.   Abdominal: Soft. There is no tenderness.   Musculoskeletal: Normal range of motion. She exhibits no deformity.   Neurological:   -   Mental Status:  AAOx3.  Follows commands.  Answers correct age and .  Recent and remote memory intact.  No neglect.    -  Speech and language:  - aphasia + dysarthria.    -  Coordination:  Finger to nose exam:  RUE mild dysmetria, LUE mild dysmetria.   -  Motor:  RUE: 4/5, 4/5 .  LUE: 5/5, 5/5 .  RLE: 5/5, DF 5/5, PF 5/5.  LLE: 5/5, DF 5/5, PF 5/5.   -  pronator drift. negative  -  Tone:  normal  -  Sensory:  Intact to light touch and pin prick.       Skin: Skin is warm and dry.   Psychiatric: She has a normal mood and affect. Her behavior is normal.   Vitals reviewed.    NEUROLOGICAL EXAMINATION:     CRANIAL NERVES     CN III, IV, VI   Pupils are equal, round, and reactive to light.  Extraocular motions are normal.       Diagnostic Results:   Labs: Reviewed  ECG: Reviewed  MRI: Reviewed  ECHO:reviewed

## 2017-10-09 NOTE — HPI
Naty St is a 72-year-old female with PMHx of Left frontal meningioma (s/p craniotomy 1/2016), DVT, Breast CA (1994), HTN, pacreatic CA (1998), serizures, and psychiatric disorder .  Patient presented to [facility] on 10/8 with speech difficulties, blurry vision, vomiting, and vertigo.  CTH revealed no acute pathology. She did not receive tPA 2/2 outside treatment window.  MRI revealed possible R MCA acute/recent infarct; however, repeat MRI does not reveal R MCA acute infarct.  MRA head/neck revealed probable occlusion of the R superior cerebral artery.  VN following. Repeat MRI brain revealed R frontal enhancement possibly infarct.  Vertigo vs. R cerebellar infarct.     Functional History: Patient lives in [place] with  in a single story home with 8 steps to enter.  Prior to admission, (I) with ADLs and mobility.  DME: [facility].

## 2017-10-09 NOTE — PLAN OF CARE
Problem: Physical Therapy Goal  Goal: Physical Therapy Goal  Goals to be met by: 10/20/2017     Patient will increase functional independence with mobility by performin. Sit to stand transfer with Contact Guard Assistance using RW  2. Bed to chair transfer with Contact Guard Assistance using Rolling Walker  3. Gait  x 100 feet with Minimal Assistance using Rolling Walker.   4. Ascend/descend 8 stairs with bilateral Handrails using min assist to access home environment safely.   5. Stand for 10 minutes with Contact Guard Assistance and no posterior LOB using Rolling Walker while performing dynamic UE tasks    Outcome: Ongoing (interventions implemented as appropriate)  Initial eval completed. Results, POC and goals discussed with patient and family.     Patient is safe to perform transfers with nursing using RW and moderate assist.  Gait ONLY with therapy at this time due to high risk for falls. Complete eval note to follow.    Ivett Nance, PT  10/9/2017  [phone] (pager)

## 2017-10-09 NOTE — PLAN OF CARE
Problem: SLP Goal  Goal: SLP Goal  Speech Language Pathology Goals  Goals expected to be met by 10/16  1. Pt will tolerate regular diet with thin liquids without overt s/s aspiration.  2. Pt will recall 3/3 simple speech strategies with min cues.  3. Pt will implement speech strategies at sentence level with min cues and 80% intelligibility.  4. Pt will complete diadochokinetic tasks with min cues and fair ability.  5. Pt will name 10 items per concrete cat with min cues to improve word fluency.  6. Pt will utilize simple memory strategies with min cues to complete immediate recall tasks with 80% accy.  7. Pt will complete assessment of reading, writing, visual spatial skills to determine need for tx.       Outcome: Ongoing (interventions implemented as appropriate)  Evaluation completed.  POC initiated. Rec regular diet with thin liquids with strict aspiration precautions. CAITLIN Botello, CCC/SLP  10/9/2017

## 2017-10-09 NOTE — CONSULTS
[facility]  Physical Medicine & Rehab  Consult Note    Patient Name: Naty St  MRN: [id number]  Admission Date: 10/8/2017  Hospital Length of Stay: 1 days  Attending Physician: Kevin Luis MD     Inpatient consult to Physical Medicine & Rehabilitation  Consult performed by: Mary Ann Sanchez NP  Consult requested by:  Kevin Luis MD    Reason for Consult:  assess rehabilitation needs    Consults  Subjective:     Principal Problem: Cerebellar infarct    HPI: Naty St is a 72-year-old female with PMHx of Left frontal meningioma (s/p craniotomy 1/2016), DVT, Breast CA (1994), HTN, pacreatic CA (1998), serizures, and psychiatric disorder .  Patient presented to [facility] on 10/8 with speech difficulties, blurry vision, vomiting, and vertigo.  CTH revealed no acute pathology. She did not receive tPA 2/2 outside treatment window.  MRI revealed possible R MCA acute/recent infarct; however, repeat MRI does not reveal R MCA acute infarct.  MRA head/neck revealed probable occlusion of the R superior cerebral artery.  VN following.     Functional History: Patient lives in [place] with  in a single story home with 8 steps to enter.  Prior to admission, (I) with ADLs and mobility.  DME: SC.      Hospital Course: 10/9/17: Evaluated by therapy.  Bed mobility SV-SBA.  Sit to stand Yamil and transfers ModA.  UBD Yamil and LBD ModA.    Past Medical History:   Diagnosis Date    Blood clot in vein 06/2016    Breast cancer 1994    Cataract     Hypertension     Pancreatic cancer 1998    Posterior capsular opacification, left eye     Psychiatric problem     Seizures 01/25/2016    Therapy      Past Surgical History:   Procedure Laterality Date    BONE BIOSPY  3/9/16    BRAIN SURGERY  1/12/16    tumor removal 1/12/16    BREAST LUMPECTOMY  1998    left    CATARACT EXTRACTION Bilateral 2004    yag OU    CHOLECYSTECTOMY  1998    COLONOSCOPY N/A 11/13/2015    Procedure:  "COLONOSCOPY;  Surgeon: Alex Carmona MD;  Location: [facility] ([facility]);  Service: Endoscopy;  Laterality: N/A;  2 year f/u    cysto and right ureteral stent  4/27/12    ecoli  2010    removal of blockage, done throat, then through the liver.    HYSTERECTOMY  1974    KIDNEY STONE SURGERY  2010--laser    left eswl  6/20/12    OOPHORECTOMY  4/25/2012    Laparoscopic BSO, lysis of adhesions, cystoscopy greater than 35mins     WHIPPLE PROCEDURE W/ LAPAROSCOPY  1998     Review of patient's allergies indicates:   Allergen Reactions    Tobradex [tobramycin-dexamethasone] Swelling    Iodinated contrast- oral and iv dye Hives    Morphine Other (See Comments)     "shaking" and tremors    Latex Rash    Phenytoin sodium extended Other (See Comments) and Rash       Scheduled Medications:    amitriptyline  50 mg Oral QHS    amlodipine  5 mg Oral Daily    atorvastatin  40 mg Oral Daily    erlotinib  150 mg Oral Daily    levetiracetam  750 mg Oral BID    lipase-protease-amylase 12,000-38,000-60,000 units  1 capsule Oral TID WM    metoprolol succinate  50 mg Oral Daily    rivaroxaban  20 mg Oral Daily with dinner    sodium chloride 0.9%  3 mL Intravenous Q8H       PRN Medications: influenza, labetalol, lorazepam, ondansetron, promethazine, sodium chloride 0.9%    Family History     Problem Relation (Age of Onset)    Breast cancer Mother    Leukemia Paternal Grandfather    Lung cancer Mother    Stomach cancer Paternal Grandmother        Social History Main Topics    Smoking status: Former Smoker     Packs/day: 0.25     Years: 0.50     Quit date: 9/26/1961    Smokeless tobacco: Never Used    Alcohol use No    Drug use: No    Sexual activity: Yes     Partners: Male     Review of Systems   Constitutional: Negative for chills, fatigue and fever.   HENT: Negative for trouble swallowing and voice change.    Eyes: Negative for photophobia and visual disturbance.   Respiratory: Negative for cough, shortness of " breath and wheezing.    Cardiovascular: Negative for chest pain and palpitations.   Gastrointestinal: Negative for abdominal distention and nausea.   Genitourinary: Negative for difficulty urinating and flank pain.   Musculoskeletal: Positive for gait problem. Negative for arthralgias.   Skin: Negative for color change and rash.   Neurological: Positive for speech difficulty and weakness. Negative for facial asymmetry, numbness and headaches.   Psychiatric/Behavioral: Negative for agitation and confusion.     Objective:     Vital Signs (Most Recent):  Temp: 99.6 °F (37.6 °C) (10/09/17 1054)  Pulse: 68 (10/09/17 1100)  Resp: 18 (10/09/17 1054)  BP: 130/60 (10/09/17 1054)  SpO2: 98 % (10/09/17 1054)    Vital Signs (24h Range):  Temp:  [98 °F (36.7 °C)-99.6 °F (37.6 °C)] 99.6 °F (37.6 °C)  Pulse:  [65-77] 68  Resp:  [16-18] 18  SpO2:  [95 %-99 %] 98 %  BP: (130-159)/(60-77) 130/60     Body mass index is 22.94 kg/m².    Physical Exam   Constitutional: She appears well-developed and well-nourished.   HENT:   Head: Normocephalic and atraumatic.   Eyes: EOM are normal. Pupils are equal, round, and reactive to light.   Neck: Normal range of motion.   Cardiovascular: Normal rate and regular rhythm.    Pulmonary/Chest: Effort normal. No respiratory distress.   Abdominal: Soft. There is no tenderness.   Musculoskeletal: Normal range of motion. She exhibits no deformity.   Neurological:   -  Mental Status:  AAOx3.  Follows commands.  Answers correct age and .  Recent and remote memory intact.  No neglect.    -  Speech and language:  - aphasia + dysarthria.    -  Coordination:  Finger to nose exam:  RUE mild dysmetria, LUE mild dysmetria.   -  Motor:  RUE: 4/5, 4/5 .  LUE: 5/5, 5/5 .  RLE: 5/5, DF 5/5, PF 5/5.  LLE: 5/5, DF 5/5, PF 5/5.   -  pronator drift. negative  -  Tone:  normal  -  Sensory:  Intact to light touch and pin prick.  Skin: Skin is warm and dry.   Psychiatric: She has a normal mood and affect. Her  behavior is normal.   Vitals reviewed.       Diagnostic Results:   Labs: Reviewed  ECG: Reviewed  MRI: Reviewed  ECHO:reviewed    Assessment/Plan:     * Right Cerebellar infarct    -possible area of nodular enhancement in R precentral gyrus that may represent subacute infarct per Radiology on MRI  -MRA head/neck revealed probable occlusion of the R superior cerebral artery.    Functional status: see hospital course  Cognitive/Speech/Language status:  Dysarthria and mild Cognitive-Linguistic Impairment. She present with decreased word fluency and articulatory precision in connected discourse.  Nutrition/Swallow Status:  Passed bedside swallow evaluation.  SLP recommended regular diet and thin liquids.    Recommendations  -  Encourage mobility, OOB in chair at least 3 hours per day, and early ambulation as appropriate   -  PT/OT evaluate and treat  -  SLP speech and cognitive evaluate and treat  -  Monitor sleep disturbances and establish consistent sleep-wake cycle  -  Monitor for bowel and bladder dysfunction  -  Monitor for shoulder pain and subluxation  -  Monitor for spasticity  -  Monitor for and prevent skin breakdown and pressure ulcers  · Early mobility, repositioning/weight shifting every 20-30 minutes when sitting, turn patient every 2 hours, proper mattress/overlay and chair cushioning, pressure relief/heel protector boots  -  DVT prophylaxis:  SCDs  -  Reviewed discharge options (IP rehab, SNF, HH therapy, and OP therapy)          PT/OT evals pending. Will follow and discuss with rehab team for rehab recommendation.      Thank you for your consult.     Mary Ann Sanchez NP  Department of Physical Medicine & Rehab  [facility]

## 2017-10-09 NOTE — PLAN OF CARE
Problem: Patient Care Overview  Goal: Plan of Care Review  Outcome: Ongoing (interventions implemented as appropriate)  Recommendations     Recommendation/Intervention: 1.  Continue Rx diet   2.  Add Boost prn   3.  Encourage/assist with PO intake  4.  MD to Rx appetite stimulant   5.  RD to monitor and follow   Goals: Pt to meet >85% of EEN/EPN   Nutrition Goal Status: new  Communication of RD Recs: reviewed with RN      Assessment and Plan     P: Inadequate nutritional intake  E: Related to diagnosis  S: as evidenced by patient interview; PO intake  Status:  NEW

## 2017-10-09 NOTE — CONSULTS
[facility]  Adult Nutrition  Consult Note    SUMMARY     Recommendations    Recommendation/Intervention: 1.  Continue Rx diet   2.  Add Boost prn   3.  Encourage/assist with PO intake  4.  MD to Rx appetite stimulant   5.  RD to monitor and follow   Goals: Pt to meet >85% of EEN/EPN   Nutrition Goal Status: new  Communication of RD Recs: reviewed with RN    Continuum of Care Plan      Reason for Assessment    Reason for Assessment: physician consult  Diagnosis: cancer diagnosis/related complications  Relevent Medical History: Seizures, Psychiatric Problems, Pancreatic CA, HTN   Interdisciplinary Rounds: did not attend     General Information Comments: Pt has been seen by outpatient RD regarding CA meal planning.  States she takes Marinol x2/daily to increase appetite.  Had been eating well prior to admit    Nutrition Discharge Planning: Optimal PO for nutritional adequacy     Nutrition Prescription Ordered    Current Diet Order: Regular   Nutrition Order Comments: Pt has not consumed any meal since admit secondary to patient not fully alert and very groggy.                  Evaluation of Received Nutrients/Fluid Intake  Energy Calories Required: not meeting needs  Protein Required: not meeting needs  Fluid Required: not meeting needs     Tolerance: other (see comments)    Nutrition Risk Screen     Nutrition Risk Screen: no indicators present    Nutrition/Diet History    Patient Reported Diet/Restrictions/Preferences: general (High Calorie/High Protein)  Typical Food/Fluid Intake: Good prior to admit  Food Preferences: No cultural or Judaism preferences identified  Meal/Snack Patterns: 3 meals/day    Labs/Tests/Procedures/Meds       Pertinent Labs Reviewed: reviewed, pertinent     Pertinent Medications Reviewed: reviewed, pertinent  Pertinent Medications Comments: Marinol not noted in MD Rx    Physical Findings    Overall Physical Appearance: weak, listlessness     Oral/Mouth Cavity: dental  "applicance present (specify), tooth/teeth missing (Dentures at home )  Skin: intact    Anthropometrics    Temp: 99.6 °F (37.6 °C)  Height Method: Stated  Height: 5' 5.5" (166.4 cm)  Weight Method: Bed Scale  Weight: 63.5 kg (139 lb 15.9 oz)     Ideal Body Weight (IBW), Female: 127.5 lb     % Ideal Body Weight, Female (lb): 109.8 lb  BMI (Calculated): 23  BMI Grade: 18.5-24.9 - normal    Estimated/Assessed Needs    Weight Used For Calorie Calculations: 63.5 kg (139 lb 15.9 oz)      Energy Calorie Requirements (kcal): 5270-2052  Energy Need Method: Kcal/kg (25-30kcals/kg/BW)       RMR (Vardaman-St. Jeor Equation): 1148.81        Weight Used For Protein Calculations: 63.5 kg (139 lb 15.9 oz)  Protein Requirements: 60-85 (1.0-1.3gmsProtein/kg/BW)       Fluid Need Method: RDA Method (1ml/1kcal or MD Rx)        RDA Method (mL): 1600      Assessment and Plan    P: Inadequate nutritional intake  E: Related to diagnosis  S: as evidenced by patient interview; PO intake  Status:  NEW    Monitor and Evaluation    Food and Nutrient Intake: food and beverage intake  Food and Nutrient Adminstration: diet order     Physical Activity and Function: nutrition-related ADLs and IADLs  Anthropometric Measurements: weight, weight change  Biochemical Data, Medical Tests and Procedures: gastrointestinal profile, glucose/endocrine profile, electrolyte and renal panel, inflammatory profile  Nutrition-Focused Physical Findings: overall appearance    Nutrition Risk    Level of Risk: other (see comments) (follow up x2/week)    Nutrition Follow-Up    RD Follow-up?: Yes      "

## 2017-10-10 ENCOUNTER — TELEPHONE (OUTPATIENT)
Dept: HEMATOLOGY/ONCOLOGY | Facility: CLINIC | Age: 72
End: 2017-10-10

## 2017-10-10 ENCOUNTER — TELEPHONE (OUTPATIENT)
Dept: NEUROSURGERY | Facility: CLINIC | Age: 72
End: 2017-10-10

## 2017-10-10 PROBLEM — R27.0 ATAXIA: Status: ACTIVE | Noted: 2017-10-10

## 2017-10-10 LAB
ALBUMIN SERPL BCP-MCNC: 2.2 G/DL
ALP SERPL-CCNC: 68 U/L
ALT SERPL W/O P-5'-P-CCNC: 16 U/L
ANION GAP SERPL CALC-SCNC: 8 MMOL/L
ANISOCYTOSIS BLD QL SMEAR: SLIGHT
AST SERPL-CCNC: 22 U/L
BASOPHILS # BLD AUTO: 0.01 K/UL
BASOPHILS NFR BLD: 0.1 %
BILIRUB SERPL-MCNC: 1.5 MG/DL
BUN SERPL-MCNC: 16 MG/DL
BURR CELLS BLD QL SMEAR: ABNORMAL
CALCIUM SERPL-MCNC: 7.5 MG/DL
CHLORIDE SERPL-SCNC: 118 MMOL/L
CO2 SERPL-SCNC: 18 MMOL/L
CREAT SERPL-MCNC: 1.3 MG/DL
DIFFERENTIAL METHOD: ABNORMAL
EOSINOPHIL # BLD AUTO: 0.1 K/UL
EOSINOPHIL NFR BLD: 1.7 %
ERYTHROCYTE [DISTWIDTH] IN BLOOD BY AUTOMATED COUNT: 14 %
EST. GFR  (AFRICAN AMERICAN): 47 ML/MIN/1.73 M^2
EST. GFR  (NON AFRICAN AMERICAN): 40.8 ML/MIN/1.73 M^2
GLUCOSE SERPL-MCNC: 96 MG/DL
HCT VFR BLD AUTO: 32 %
HGB BLD-MCNC: 10 G/DL
LYMPHOCYTES # BLD AUTO: 1.9 K/UL
LYMPHOCYTES NFR BLD: 27 %
MCH RBC QN AUTO: 28.3 PG
MCHC RBC AUTO-ENTMCNC: 31.3 G/DL
MCV RBC AUTO: 91 FL
MONOCYTES # BLD AUTO: 0.7 K/UL
MONOCYTES NFR BLD: 9.1 %
NEUTROPHILS # BLD AUTO: 4.4 K/UL
NEUTROPHILS NFR BLD: 62.1 %
OVALOCYTES BLD QL SMEAR: ABNORMAL
PLATELET # BLD AUTO: 235 K/UL
PLATELET BLD QL SMEAR: ABNORMAL
PMV BLD AUTO: 9.6 FL
POIKILOCYTOSIS BLD QL SMEAR: SLIGHT
POTASSIUM SERPL-SCNC: 3.9 MMOL/L
PROT SERPL-MCNC: 5.2 G/DL
RBC # BLD AUTO: 3.53 M/UL
SODIUM SERPL-SCNC: 144 MMOL/L
WBC # BLD AUTO: 7.16 K/UL

## 2017-10-10 PROCEDURE — 92507 TX SP LANG VOICE COMM INDIV: CPT

## 2017-10-10 PROCEDURE — 36415 COLL VENOUS BLD VENIPUNCTURE: CPT

## 2017-10-10 PROCEDURE — 85025 COMPLETE CBC W/AUTO DIFF WBC: CPT

## 2017-10-10 PROCEDURE — 20600001 HC STEP DOWN PRIVATE ROOM

## 2017-10-10 PROCEDURE — 99233 SBSQ HOSP IP/OBS HIGH 50: CPT | Mod: GC,,, | Performed by: PSYCHIATRY & NEUROLOGY

## 2017-10-10 PROCEDURE — 97116 GAIT TRAINING THERAPY: CPT

## 2017-10-10 PROCEDURE — 99232 SBSQ HOSP IP/OBS MODERATE 35: CPT | Mod: ,,, | Performed by: NURSE PRACTITIONER

## 2017-10-10 PROCEDURE — 97112 NEUROMUSCULAR REEDUCATION: CPT

## 2017-10-10 PROCEDURE — A4216 STERILE WATER/SALINE, 10 ML: HCPCS | Performed by: STUDENT IN AN ORGANIZED HEALTH CARE EDUCATION/TRAINING PROGRAM

## 2017-10-10 PROCEDURE — 97535 SELF CARE MNGMENT TRAINING: CPT

## 2017-10-10 PROCEDURE — 97530 THERAPEUTIC ACTIVITIES: CPT

## 2017-10-10 PROCEDURE — 80053 COMPREHEN METABOLIC PANEL: CPT

## 2017-10-10 PROCEDURE — 25000003 PHARM REV CODE 250: Performed by: STUDENT IN AN ORGANIZED HEALTH CARE EDUCATION/TRAINING PROGRAM

## 2017-10-10 PROCEDURE — 25000003 PHARM REV CODE 250: Performed by: PHYSICIAN ASSISTANT

## 2017-10-10 RX ADMIN — PANCRELIPASE 1 CAPSULE: 60000; 12000; 38000 CAPSULE, DELAYED RELEASE PELLETS ORAL at 05:10

## 2017-10-10 RX ADMIN — LEVETIRACETAM 750 MG: 250 TABLET, FILM COATED ORAL at 09:10

## 2017-10-10 RX ADMIN — RIVAROXABAN 20 MG: 20 TABLET, FILM COATED ORAL at 05:10

## 2017-10-10 RX ADMIN — Medication 3 ML: at 06:10

## 2017-10-10 RX ADMIN — ERLOTINIB HYDROCHLORIDE 150 MG: 150 TABLET ORAL at 08:10

## 2017-10-10 RX ADMIN — LEVETIRACETAM 750 MG: 250 TABLET, FILM COATED ORAL at 08:10

## 2017-10-10 RX ADMIN — PANCRELIPASE 1 CAPSULE: 60000; 12000; 38000 CAPSULE, DELAYED RELEASE PELLETS ORAL at 08:10

## 2017-10-10 RX ADMIN — PANCRELIPASE 1 CAPSULE: 60000; 12000; 38000 CAPSULE, DELAYED RELEASE PELLETS ORAL at 12:10

## 2017-10-10 RX ADMIN — AMLODIPINE BESYLATE 5 MG: 5 TABLET ORAL at 08:10

## 2017-10-10 RX ADMIN — AMITRIPTYLINE HYDROCHLORIDE 50 MG: 50 TABLET, FILM COATED ORAL at 09:10

## 2017-10-10 RX ADMIN — ATORVASTATIN CALCIUM 40 MG: 20 TABLET, FILM COATED ORAL at 08:10

## 2017-10-10 RX ADMIN — METOPROLOL SUCCINATE 50 MG: 50 TABLET, EXTENDED RELEASE ORAL at 08:10

## 2017-10-10 NOTE — TELEPHONE ENCOUNTER
Spoke with son.  Son is calling to inform dr sullivan patient is presently admitted for stroke.  Son states patient is doing as best as she can--is not speaking well or walking yet---but will be possibly discharged to rehab for PT/OT/speech therapy for a couple weeks.  Nurse thanked son for calling--  Son will stay in touch with nurse throughout her recovery.      Message routed to dr sullivan (just FYI)

## 2017-10-10 NOTE — PT/OT/SLP PROGRESS
Physical Therapy  Treatment    Naty St   MRN: [id number]   Admitting Diagnosis: Cerebellar infarct    PT Received On: 10/10/17  PT Start Time: 1434     PT Stop Time: 1500    PT Total Time (min): 26 min       Billable Minutes:  Gait Training 17 and Therapeutic Activity 9    Treatment Type: Treatment  PT/PTA: PTA     PTA Visit Number: 1       General Precautions: Standard, aspiration, fall  Orthopedic Precautions: N/A   Braces: N/A         Subjective:  Communicated with RN (Christiana) prior to session.  Pt agreeable to PT session    Pain/Comfort  Pain Rating 1: 0/10  Pain Rating Post-Intervention 1: 0/10    Objective:   Patient found with: telemetry (UIC with  present in the room)        FUNCTIONAL MOBILITY    Bed Mobility        NP 2* pt found/left seated UIC    Transfers  (vc's for hand placement and safety of task)       Sit > stand from BS chair and w/c with RW requiring mod A for forward lean of trunk, hip ext and balance       Stand > sit to BS chair and w/c requiring min A for controlled descent        Gait        Distance: 30ft and then 25ft (seated rest break in between)       Assistive Device: RW (volunteer follows with w/c)       Assistance Required: max to mod A to facilitate trunk ext, hip extension, management of AD                                          And balance       Verbal Cues required: for postural control, appropriate weight shift, sequencing, to relax B                                      Knee in preparation for swing phase, for appropriate heel strike,                                       B step length, to widen MERON, for leila and safety       Gait Deviations: Pt demonstrates increased leila, increased time in double stance,                              Decreased B step length, decreased stride length, narrow base of support,                              decreased toe-to-floor clearance, decreased weight-shifting ability, occasional scissoring                              foot flat, R post lean (retropulsion) and increased difficulty maintaining balance during lateral stepping                             And turns        THERAPEUTIC ACTIVITIES     STANDING (RW min)       Pt tolerates standing with RW requiring min A for balance with vc's.         Education:  Education provided to pt regarding: postural control, weight shift.    Whiteboard updated with correct mobility information. RN/PCT notified.  Pt apropriate to amb with therapy ONLY at this time.   Pt safe to transfer with RN/PCT: Use RW & 1 person Assist.    AM-PAC 6 CLICK MOBILITY  How much help from another person does this patient currently need?   1 = Unable, Total/Dependent Assistance  2 = A lot, Maximum/Moderate Assistance  3 = A little, Minimum/Contact Guard/Supervision  4 = None, Modified [place]/Independent    Turning over in bed (including adjusting bedclothes, sheets and blankets)?: 4  Sitting down on and standing up from a chair with arms (e.g., wheelchair, bedside commode, etc.): 2  Moving from lying on back to sitting on the side of the bed?: 4  Moving to and from a bed to a chair (including a wheelchair)?: 2  Need to walk in hospital room?: 2  Climbing 3-5 steps with a railing?: 2  Total Score: 16    AM-PAC Raw Score CMS G-Code Modifier Level of Impairment Assistance   6 % Total / Unable   7 - 9 CM 80 - 100% Maximal Assist   10 - 14 CL 60 - 80% Moderate Assist   15 - 19 CK 40 - 60% Moderate Assist   20 - 22 CJ 20 - 40% Minimal Assist   23 CI 1-20% SBA / CGA   24 CH 0% Independent/ Mod I     Patient left up in chair with all lines intact, call button in reach, RN notified and  present.    Assessment:  Naty St is a 72 y.o. female with a medical diagnosis of Cerebellar infarct and presents with R hemiparesis, impaired balance, cognitive deficits requiring significant assistance and verbal cues for transfers and gait to prevent falls.   In light of pt's current functional level and  deficits, it is anticipated that pt will need to participate in an intense rehab program consisting of PT, OT and ST in order to achieve full rehab potential to return to previous level of function and roles.  Pt remains motivated to participate in PT session and will cont to benefit from skilled PT intervention.  .    Rehab identified problem list/impairments: Rehab identified problem list/impairments: weakness, impaired endurance, impaired sensation, impaired self care skills, impaired functional mobilty, gait instability, impaired balance, decreased coordination, decreased upper extremity function, decreased lower extremity function, decreased safety awareness, impaired coordination, impaired fine motor    Rehab potential is good.    Activity tolerance: Good    Discharge recommendations: Discharge Facility/Level Of Care Needs: rehabilitation facility     Barriers to discharge: Barriers to Discharge: Decreased caregiver support, Inaccessible home environment (8 DENNY)    Equipment recommendations: Equipment Needed After Discharge:  (TBD)     GOALS:    Physical Therapy Goals        Problem: Physical Therapy Goal    Goal Priority Disciplines Outcome Goal Variances Interventions   Physical Therapy Goal     PT/OT, PT Ongoing (interventions implemented as appropriate)     Description:  Goals to be met by: 10/20/2017     Patient will increase functional independence with mobility by performin. Sit to stand transfer with Contact Guard Assistance using RW  2. Bed to chair transfer with Contact Guard Assistance using Rolling Walker  3. Gait  x 100 feet with Minimal Assistance using Rolling Walker.   4. Ascend/descend 8 stairs with bilateral Handrails using min assist to access home environment safely.   5. Stand for 10 minutes with Contact Guard Assistance and no posterior LOB using Rolling Walker while performing dynamic UE tasks                      PLAN:    Patient to be seen 6 x/week  to address the above listed  problems via gait training, therapeutic activities, therapeutic exercises, neuromuscular re-education  Plan of Care expires:    Plan of Care reviewed with: patient, spouse         Opal Metcalf, PTA  10/10/2017

## 2017-10-10 NOTE — SUBJECTIVE & OBJECTIVE
Interval History:  (10/10/2017): Patient is seen for follow-up rehab evaluation and recommendations.  No acute events over night.  Participating with therapy.  Barriers for discharge/rehab admission: not medically ready for discharge and Insurance approval pending for rehab admission.        HPI, Past Medical, Surgical, Family, and Social History remains the same as documented in the initial encounter.      Scheduled Medications:    amitriptyline  50 mg Oral QHS    amlodipine  5 mg Oral Daily    atorvastatin  40 mg Oral Daily    erlotinib  150 mg Oral Daily    levetiracetam  750 mg Oral BID    lipase-protease-amylase 12,000-38,000-60,000 units  1 capsule Oral TID WM    metoprolol succinate  50 mg Oral Daily    rivaroxaban  20 mg Oral Daily with dinner    sodium chloride 0.9%  3 mL Intravenous Q8H       PRN Medications: influenza, labetalol, lorazepam, ondansetron, promethazine, sodium chloride 0.9%    Review of Systems   Constitutional: Negative for chills, fatigue and fever.   HENT: Negative for trouble swallowing and voice change.    Eyes: Negative for photophobia and visual disturbance.   Respiratory: Negative for cough, shortness of breath and wheezing.    Cardiovascular: Negative for chest pain and palpitations.   Gastrointestinal: Negative for abdominal distention and nausea.   Genitourinary: Negative for difficulty urinating and flank pain.   Musculoskeletal: Positive for gait problem. Negative for arthralgias.   Skin: Negative for color change and rash.   Neurological: Positive for speech difficulty and weakness. Negative for facial asymmetry, numbness and headaches.   Psychiatric/Behavioral: Negative for agitation and confusion.     Objective:     Vital Signs (Most Recent):  Temp: 97.5 °F (36.4 °C) (10/10/17 0826)  Pulse: 73 (10/10/17 0826)  Resp: 15 (10/10/17 0826)  BP: (!) 116/59 (10/10/17 0826)  SpO2: (!) 92 % (10/10/17 0826)    Vital Signs (24h Range):  Temp:  [96.5 °F (35.8 °C)-99.6 °F (37.6  °C)] 97.5 °F (36.4 °C)  Pulse:  [64-73] 73  Resp:  [12-18] 15  SpO2:  [92 %-98 %] 92 %  BP: (116-145)/(58-67) 116/59     Physical Exam   Constitutional: She appears well-developed and well-nourished.   HENT:   Head: Normocephalic and atraumatic.   Eyes: EOM are normal. Pupils are equal, round, and reactive to light.   Neck: Normal range of motion.   Cardiovascular: Normal rate and regular rhythm.    Pulmonary/Chest: Effort normal. No respiratory distress.   Abdominal: Soft. There is no tenderness.   Musculoskeletal: Normal range of motion. She exhibits no deformity.   Neurological:   -  Mental Status:  AAOx3.  Follows commands.  Answers correct age and .  Recent and remote memory intact.  No neglect.    -  Speech and language:  - aphasia + dysarthria.    -  Coordination:  Finger to nose exam:  RUE mild dysmetria, LUE dysmetria.   -  Motor:  RUE: 4/5, 4/5 .  LUE: 5/5, 5/5 .  RLE: 5/5, DF 5/5, PF 5/5.  LLE: 5/5, DF 5/5, PF 5/5.   -  pronator drift. negative  -  Tone:  normal  -  Sensory:  Intact to light touch and pin prick.       Skin: Skin is warm and dry.   Psychiatric: She has a normal mood and affect. Her behavior is normal.   Vitals reviewed.    NEUROLOGICAL EXAMINATION:     CRANIAL NERVES     CN III, IV, VI   Pupils are equal, round, and reactive to light.  Extraocular motions are normal.

## 2017-10-10 NOTE — TELEPHONE ENCOUNTER
----- Message from Maya Young sent at 10/10/2017  8:23 AM CDT -----  Contact: Basil (son) @ [phone]  Pt had a seizure and was admitted over the weekend.  pts son would like to see if Dr Ferrera can get with the Neurologist to go over pts test results.  pls call.

## 2017-10-10 NOTE — PT/OT/SLP PROGRESS
"Occupational Therapy  Treatment    Naty St   MRN: [id number]   Admitting Diagnosis: Cerebellar infarct    OT Date of Treatment: 10/10/17   OT Start Time: 0708  OT Stop Time: 0731  OT Total Time (min): 23 min    Billable Minutes:  Self Care/Home Management 10 and Neuromuscular Re-education 13    General Precautions: Standard, aspiration, fall  Orthopedic Precautions: N/A  Braces: N/A    Do you have any cultural, spiritual, Orthodox conflicts, given your current situation?: Yarsani    Subjective:  Communicated with nurse prior to session.  Patient:  "I think I am doing better today.  I am talking better."  Pain/Comfort  Pain Rating 1: 0/10  Pain Rating Post-Intervention 1: 0/10    Objective:  Patient found with: telemetry   present.    Functional Mobility:  Bed Mobility:  Rolling/Turning to Left: Supervision  Rolling/Turning Right: Supervision  Scooting/Bridging: Stand by Assistance  Supine to Sit: Stand by Assistance (from the left side)    Transfers:   Sit <> Stand Assistance: Minimum Assistance  Sit <> Stand Assistive Device: No Assistive Device  Bed <> Chair Technique: Stand Pivot  Bed <> Chair Transfer Assistance: Moderate Assistance    Activities of Daily Living:  UE Dressing Level of Assistance: Stand by assistance (while seated EOB)  LE Dressing Level of Assistance: Moderate assistance (for standing balance)  Grooming Position: Standing  Grooming Level of Assistance: Moderate assistance     Additional Treatment:   Patient/ Family education provided for stroke warning signs, prevention guidelines and personal risk factors.  Patient/ Family verbalizing understanding via teach back method.   Patient education provided on role of OT and need for rehab upon discharge.  Patient verbalizing understanding via teach back method. Patient and family instructed on need to call for assistance for toileting needs and when getting up.  Continued education, patient/ family training recommended.  Patient " "alert and oriented x 3; able to follow 4/4 one step commands.  Moderate assist required with postural control while standing.  Patient's functional status and disposition recommendation discussed with stroke team in daily rounds.  White board updated in patient's room.  OT asked if there were any other questions; patient/ family had no further questions.    AM-PAC 6 CLICK ADL   How much help from another person does this patient currently need?   1 = Unable, Total/Dependent Assistance  2 = A lot, Maximum/Moderate Assistance  3 = A little, Minimum/Contact Guard/Supervision  4 = None, Modified [place]/Independent    Putting on and taking off regular lower body clothing? : 2  Bathing (including washing, rinsing, drying)?: 2  Toileting, which includes using toilet, bedpan, or urinal? : 2  Putting on and taking off regular upper body clothing?: 3  Taking care of personal grooming such as brushing teeth?: 3  Eating meals?: 3  Total Score: 15     AM-PAC Raw Score CMS "G-Code Modifier Level of Impairment Assistance   6 % Total / Unable   7 - 8 CM 80 - 100% Maximal Assist   9-13 CL 60 - 80% Moderate Assist   14 - 19 CK 40 - 60% Moderate Assist   20 - 22 CJ 20 - 40% Minimal Assist   23 CI 1-20% SBA / CGA   24 CH 0% Independent/ Mod I       Patient left supine with all lines intact and call button in reach    ASSESSMENT:  Naty St is a 72 y.o. female with a medical diagnosis of  r/o stroke (suspected right cerebellar infarct) and presents with performance deficits of physical skills including impaired  balance, mobility, dexterity, fine motor coordination, gross motor coordination, and endurance; demonstrating performance deficits of cognitive skills including impaired problem solving, sequencing and memory all resulting in inability organizing occupational performance in a timely and safe manner.  These performance deficits have resulted in activity limitations including but not limited to:   " transfers, ascending/ descending stairs, walking short and long distances, walking around obstacles, transitional movement patterns (kneeling, bending); eating, upper body dressing, lower body dressing, brushing teeth, toileting, bathing, carrying objects, writing, and driving.   Patient's role as mother, wife, grand mother and independent caretaker for self has been affected. Patient will benefit from skilled OT services to maximize level of independence with self-care skills and functional mobility.  Will benefit from rehab.    Rehab identified problem list/impairments: Rehab identified problem list/impairments: weakness, impaired endurance, gait instability, impaired functional mobilty, impaired self care skills, impaired balance, decreased coordination, decreased upper extremity function, decreased lower extremity function, impaired fine motor, impaired coordination, decreased ROM, decreased safety awareness    Rehab potential is good.    Activity tolerance: Good    Discharge recommendations: Discharge Facility/Level Of Care Needs: rehabilitation facility     Barriers to discharge: Barriers to Discharge: Inaccessible home environment, Decreased caregiver support    Equipment recommendations: 3-in-1 commode, bath bench     GOALS:    Occupational Therapy Goals        Problem: Occupational Therapy Goal    Goal Priority Disciplines Outcome Interventions   Occupational Therapy Goal     OT, PT/OT     Description:  Goals set 10/9 to be addressed for 7 days with expiration date, 10/16:  Patient will increase functional independence with ADLs by performing:  Patient will demonstrate supine -sit with modified independence.   Not met  Patient will demonstrate stand pivot transfers with CGA.   Not met  Patient will demonstrate grooming while standing with CGA.   Not met  Patient will demonstrate upper body dressing with SBA while seated EOB.   Not met  Patient will demonstrate lower body dressing with min assist while  seated EOB.   Not met  Patient will demonstrate toileting with CGA.   Not met  Patient will demonstrate bathing while seated EOB with CGA.   Not met  Patient's family / caregiver will demonstrate independence and safety with assisting patient with self-care skills and functional mobility.     Not met  Patient and/or patient's family will verbalize understanding of stroke prevention guidelines, personal risk factors and stroke warning signs via teachback method.  Not met                           Plan:  Patient to be seen 6 x/week to address the above listed problems via self-care/home management, neuromuscular re-education, cognitive retraining, sensory integration, therapeutic activities, therapeutic exercises  Plan of Care expires: 11/06/17  Plan of Care reviewed with: patient, spouse         LEOBARDO Malone  10/10/2017

## 2017-10-10 NOTE — ASSESSMENT & PLAN NOTE
She had of pan ca with mets s/p crani for resection. She fond to have nodular enhancement involving the right precentral gyrus concerning for possible residual vs recurrence   -- NSGY and Oncology updated

## 2017-10-10 NOTE — SUBJECTIVE & OBJECTIVE
Neurologic Chief Complaint: Vertigo    Subjective:     Interval History: No acute events overnight and no issues per nursing. Stable on exam. All labs WNL. Tolerating PO intake and sating well at [facility].Ready and medically stable for discharge to [facility] pending insuarnce approval      HPI, Past Medical, Family, and Social History remains the same as documented in the initial encounter.     Review of Systems  She denies any complaint this am      Scheduled Meds:   amitriptyline  50 mg Oral QHS    amlodipine  5 mg Oral Daily    atorvastatin  40 mg Oral Daily    erlotinib  150 mg Oral Daily    levetiracetam  750 mg Oral BID    lipase-protease-amylase 12,000-38,000-60,000 units  1 capsule Oral TID WM    metoprolol succinate  50 mg Oral Daily    rivaroxaban  20 mg Oral Daily with dinner    sodium chloride 0.9%  3 mL Intravenous Q8H     Continuous Infusions:   sodium chloride 0.9%       PRN Meds:influenza, labetalol, lorazepam, ondansetron, promethazine, sodium chloride 0.9%    Objective:     Vital Signs (Most Recent):  Temp: 97.3 °F (36.3 °C) (10/10/17 1153)  Pulse: 66 (10/10/17 1200)  Resp: 18 (10/10/17 1153)  BP: 103/63 (10/10/17 1153)  SpO2: (!) 92 % (10/10/17 0826)  BP Location: Right arm    Vital Signs Range (Last 24H):  Temp:  [96.5 °F (35.8 °C)-98.6 °F (37 °C)]   Pulse:  [64-73]   Resp:  [12-18]   BP: (103-145)/(58-67)   SpO2:  [92 %-95 %]   BP Location: Right arm    Physical Exam  General: Well developed, well nourished female in NAD  HEENT: Conjunctiva clear; Oropharynx clear  Neck: No JVD noted, Supple  CV: Normal S1, S2 with no murmurs,gallops,rubs  Resp: Lungs CTA Bilaterally, Unlabored  Abdomen: NTND, BS normoactive x4 quads, soft  Extrem: No cyanosis, clubbing, edema.  Skin: No rashes, lesions, ulcers      Neurological Exam:   LOC: alert and follows requests  Language: No aphasia  Speech: No dysarthria  Orientation: Person, Place, Time  Memory: Recent memory intact, Remote memory intact, Age correct,  Month correct  Visual Fields (CN II): Full  EOM (CN III, IV, VI): Full/intact  Pupils (CN III, IV, VI): PERRL  Facial Movement (CN VII): symmetric facial expression  Gag Reflex (CN IX, X): normal/symmetric  Shoulder/Neck (CN XI): SCM-Left: Normal ; SCM-Right: Normal ; Shoulder Shrug: Normal/Symetric  Tongue (CN XII): to midline  Reflexes: flexor plantar responses bilaterally  Motor*: Arm Left:  Normal (5/5), Leg Left:   Normal (5/5), Arm Right:   Normal (5/5), Leg Right:   Normal (5/5)  Cerebellar*:Mild R arin-ataxia and moderate gait ataxia  Sensation: intact to light touch, temperature and vibration  Tone: Arm-Left: normal; Leg-Left: normal; Arm-Right: normal; Leg-Right: normal    Stroke Scales  Interval: baseline (upon arrival/admit)  Level of Consciousness: 0 - alert  LOC Questions: 0 - answers both correctly  LOC Commands: 0 - performs both correctly  Best Gaze: 0 - normal  Visual: 0 - no visual loss  Facial Palsy: 0 - normal  Motor Left Arm: 0 - no drift  Motor Right Arm: 0 - no drift  Motor Left Le - no drift  Motor Right Le - no drift  Limb Ataxia: 1 - present in one limb  Sensory: 0 - normal  Best Language: 0 - no aphasia  Dysarthria: 0 - normal articulation  Extinction and Inattention: 0 - no neglect  NIH Stroke Scale Total: 1    Laboratory:  CMP:     Recent Labs  Lab 10/10/17  0449   CALCIUM 7.5*   ALBUMIN 2.2*   PROT 5.2*      K 3.9   CO2 18*   *   BUN 16   CREATININE 1.3   ALKPHOS 68   ALT 16   AST 22   BILITOT 1.5*     CBC:     Recent Labs  Lab 10/10/17  0449   WBC 7.16   RBC 3.53*   HGB 10.0*   HCT 32.0*      MCV 91   MCH 28.3   MCHC 31.3*     Lipid Panel:     Recent Labs  Lab 10/08/17  0619   CHOL 172   LDLCALC 83.4   HDL 76*   TRIG 63       Diagnostic Results:  I have personally reviewed: MRI Head. Date: 10/08/17  Findings: No enhancement within the right cerebellar signal abnormality compatible with acute infarction.    There is ill-defined subcentimeter nodular  enhancement in the right precentral gyrus which is nonspecific and may represent subacute age area of infarction however in light of history new cortical metastases cannot be excluded. Clinical correlation and consideration for correlation with CSF analysis and short-term followup recommended.    There is continued pachymeningeal enhancement underlying the craniotomy with continued somewhat nodular enhancement within overlying left frontal convexity overall reduced from prior. No evidence for new or increased enhancement in this region to suggest definite local worsening residual recurrent lesion.

## 2017-10-10 NOTE — PLAN OF CARE
Problem: Patient Care Overview  Goal: Plan of Care Review  POC reviewed with pt and ; verbalized understanding. AAOx4. VSS. No acute changes. Pt sat up in chair all day.  at bedside. Safety precautions maintained. Will continue to monitor.

## 2017-10-10 NOTE — PT/OT/SLP PROGRESS
"Speech Language Pathology  Treatment    Naty St   MRN: [id number]   Admitting Diagnosis: Cerebellar infarct    Diet recommendations: Solid Diet Level: Regular  Liquid Diet Level: Thin Feed only when awake/alert, HOB to 90 degrees, Small bites/sips, 1 bite/sip at a time, Meds whole 1 at a time and Avoid talking while eating    SLP Treatment Date: 10/10/17  Speech Start Time: 1135     Speech Stop Time: 1150     Speech Total (min): 15 min       TREATMENT BILLABLE MINUTES:  Speech Therapy Individual 15    Has the patient been evaluated by SLP for swallowing? : Yes  Keep patient NPO?: No   General Precautions: Standard, fall, aspiration          Subjective:  "Oh my. I haven't realized it was this bad until now." Pt stated after seeing how she wrote her name.     Pain/Comfort  Pain Rating 1: 0/10  Pain Rating Post-Intervention 1: 0/10    Objective:   Patient found with: telemetry  Upon entering room, pt was found napping upright in a chair with spouse present. Pt was easily roused, and agreed to participate in tx. Pt reported tolerating regular and thin diet well. Pt's voice presented with low intensity at the beginning of session, and pt reported continued difficulty with speech. SLP provided pt with simple speech strategies to help pt improve speech intelligibility. The importance of slow rate stressed. Pt verbalized understanding of strategies, though limited carryover to conversational speech noted.  Assessment of pt's functional reading, writing, and visual-spatial skills was conducted. Pt's reading skills found to be WFL, however, dysarthric speech affected intelligibility. Pt's writing skills presented WFL, but pt reported changes in legibility and writing speed. Visual-spatial skills found to be WFL, as pt was able to write numbers on clock-face with accurate spacing. SLP provided education re: role of SLP, speech strategies, process of reading/writing/visual-spatial evaluation, results of evaluation, " and SLP POC. Pt verbalized understanding of all discussed. White board up to date.     Assessment:  Naty St is a 72 y.o. female with a medical diagnosis of Cerebellar infarct and presents with Dysarthria and mild Cognitive-Linguistic Impairment.    Discharge recommendations: Discharge Facility/Level Of Care Needs: rehabilitation facility     Goals:    SLP Goals        Problem: SLP Goal    Goal Priority Disciplines Outcome   SLP Goal     SLP Ongoing (interventions implemented as appropriate)   Description:  Speech Language Pathology Goals  Goals expected to be met by 10/16  1. Pt will tolerate regular diet with thin liquids without overt s/s aspiration.  2. Pt will recall 3/3 simple speech strategies with min cues.  3. Pt will implement speech strategies at sentence level with min cues and 80% intelligibility.  4. Pt will complete diadochokinetic tasks with min cues and fair ability.  5. Pt will name 10 items per concrete cat with min cues to improve word fluency.  6. Pt will utilize simple memory strategies with min cues to complete immediate recall tasks with 80% accy.  7. Pt will complete assessment of reading, writing, visual spatial skills to determine need for tx.                          Plan:   Patient to be seen Therapy Frequency: 5 x/week   Plan of Care expires: 11/08/17  Plan of Care reviewed with: patient, spouse  SLP Follow-up?: Yes              STEPHANIE Lima  10/10/2017

## 2017-10-10 NOTE — PLAN OF CARE
Problem: Patient Care Overview  Goal: Plan of Care Review  Outcome: Ongoing (interventions implemented as appropriate)  POC reviewed with patient. Patient has no c/o pain or discomfort. Up to bedside commode with 1 assist.  and son at bedside and attentive to care. VSS. Free from falls or injury.

## 2017-10-10 NOTE — PLAN OF CARE
Problem: Occupational Therapy Goal  Goal: Occupational Therapy Goal  Goals set 10/9 to be addressed for 7 days with expiration date, 10/16:  Patient will increase functional independence with ADLs by performing:  Patient will demonstrate supine -sit with modified independence.   Not met  Patient will demonstrate stand pivot transfers with CGA.   Not met  Patient will demonstrate grooming while standing with CGA.   Not met  Patient will demonstrate upper body dressing with SBA while seated EOB.   Not met  Patient will demonstrate lower body dressing with min assist while seated EOB.   Not met  Patient will demonstrate toileting with CGA.   Not met  Patient will demonstrate bathing while seated EOB with CGA.   Not met  Patient's family / caregiver will demonstrate independence and safety with assisting patient with self-care skills and functional mobility.     Not met  Patient and/or patient's family will verbalize understanding of stroke prevention guidelines, personal risk factors and stroke warning signs via teachback method.  Not met          Goals remain appropriate.  LEOBARDO Iqbal  10/10/2017

## 2017-10-10 NOTE — HOSPITAL COURSE
10/10: No acute events overnight and no issues per nursing. Stable on exam. All labs WNL. Tolerating PO intake and sating well at RA.Ready and medically stable for discharge to [facility] pending insuarnce approval  10/11: NAEON. No issues per nursing. Neuroexam unchanged. BP well controlled and sating well at RA. Tolerating PO intake very well. Working with PT/OT and ambulating.Voiding approprietly. She got accepted to [facility]. She is ready and medically stable to be discharge to [facility] today.

## 2017-10-10 NOTE — PLAN OF CARE
Problem: SLP Goal  Goal: SLP Goal  Speech Language Pathology Goals  Goals expected to be met by 10/16  1. Pt will tolerate regular diet with thin liquids without overt s/s aspiration.  2. Pt will recall 3/3 simple speech strategies with min cues.  3. Pt will implement speech strategies at sentence level with min cues and 80% intelligibility.  4. Pt will complete diadochokinetic tasks with min cues and fair ability.  5. Pt will name 10 items per concrete cat with min cues to improve word fluency.  6. Pt will utilize simple memory strategies with min cues to complete immediate recall tasks with 80% accy.  7. Pt will complete assessment of reading, writing, visual spatial skills to determine need for tx.        Outcome: Ongoing (interventions implemented as appropriate)  Assessment of pt's reading, writing, and visual-spatial skills was completed today. Simple speech strategies were introduced. Goals remain appropriate. Continue POC.  STEPHANIE Lima  10/10/2017

## 2017-10-10 NOTE — PROGRESS NOTES
[facility]  Physical Medicine & Rehab  Progress Note    Patient Name: Naty St  MRN: [id number]  Admission Date: 10/8/2017  Length of Stay: 2 days  Attending Physician: Kevin Luis MD  Primary Care Provider: Olvin Mars MD    Subjective:     Principal Problem:Cerebellar infarct    Hospital Course:   10/9/17: Evaluated by therapy.  Bed mobility SV-SBA.  Sit to stand Yamil and transfers ModA.  Ambulated 10ft Yamil & RW with significant retropulsion and LE ataxia.  UBD Yamil and LBD ModA.      Interval History:  (10/10/2017): Patient is seen for follow-up rehab evaluation and recommendations.  No acute events over night.  Participating with therapy.  Barriers for discharge/rehab admission: not medically ready for discharge and Insurance approval pending for rehab admission.        HPI, Past Medical, Surgical, Family, and Social History remains the same as documented in the initial encounter.      Scheduled Medications:    amitriptyline  50 mg Oral QHS    amlodipine  5 mg Oral Daily    atorvastatin  40 mg Oral Daily    erlotinib  150 mg Oral Daily    levetiracetam  750 mg Oral BID    lipase-protease-amylase 12,000-38,000-60,000 units  1 capsule Oral TID WM    metoprolol succinate  50 mg Oral Daily    rivaroxaban  20 mg Oral Daily with dinner    sodium chloride 0.9%  3 mL Intravenous Q8H       PRN Medications: influenza, labetalol, lorazepam, ondansetron, promethazine, sodium chloride 0.9%    Review of Systems   Constitutional: Negative for chills, fatigue and fever.   HENT: Negative for trouble swallowing and voice change.    Eyes: Negative for photophobia and visual disturbance.   Respiratory: Negative for cough, shortness of breath and wheezing.    Cardiovascular: Negative for chest pain and palpitations.   Gastrointestinal: Negative for abdominal distention and nausea.   Genitourinary: Negative for difficulty urinating and flank pain.   Musculoskeletal: Positive for  gait problem. Negative for arthralgias.   Skin: Negative for color change and rash.   Neurological: Positive for speech difficulty and weakness. Negative for facial asymmetry, numbness and headaches.   Psychiatric/Behavioral: Negative for agitation and confusion.     Objective:     Vital Signs (Most Recent):  Temp: 97.5 °F (36.4 °C) (10/10/17 0826)  Pulse: 73 (10/10/17 0826)  Resp: 15 (10/10/17 0826)  BP: (!) 116/59 (10/10/17 0826)  SpO2: (!) 92 % (10/10/17 0826)    Vital Signs (24h Range):  Temp:  [96.5 °F (35.8 °C)-99.6 °F (37.6 °C)] 97.5 °F (36.4 °C)  Pulse:  [64-73] 73  Resp:  [12-18] 15  SpO2:  [92 %-98 %] 92 %  BP: (116-145)/(58-67) 116/59     Physical Exam   Constitutional: She appears well-developed and well-nourished.   HENT:   Head: Normocephalic and atraumatic.   Eyes: EOM are normal. Pupils are equal, round, and reactive to light.   Neck: Normal range of motion.   Cardiovascular: Normal rate and regular rhythm.    Pulmonary/Chest: Effort normal. No respiratory distress.   Abdominal: Soft. There is no tenderness.   Musculoskeletal: Normal range of motion. She exhibits no deformity.   Neurological:   -  Mental Status:  AAOx3.  Follows commands.  Answers correct age and .  Recent and remote memory intact.  No neglect.    -  Speech and language:  - aphasia + dysarthria.    -  Coordination:  Finger to nose exam:  RUE mild dysmetria, LUE dysmetria.   -  Motor:  RUE: 4/5, 4/5 .  LUE: 5/5, 5/5 .  RLE: 5/5, DF 5/5, PF 5/5.  LLE: 5/5, DF 5/5, PF 5/5.   -  pronator drift. negative  -  Tone:  normal  -  Sensory:  Intact to light touch and pin prick.  Skin: Skin is warm and dry.   Psychiatric: She has a normal mood and affect. Her behavior is normal.   Vitals reviewed.      Diagnostic Results:   Labs: Reviewed  X-Ray: Reviewed  MRI: Reviewed  Assessment/Plan:      * Right Cerebellar infarct    -possible area of nodular enhancement in R precentral gyrus that may represent subacute infarct per Radiology      Functional status: see hospital course  Cognitive/Speech/Language status:  Dysarthria and mild Cognitive-Linguistic Impairment. She present with decreased word fluency and articulatory precision in connected discourse.  Nutrition/Swallow Status:  Passed bedside swallow evaluation.  SLP recommended regular diet and thin liquids.    Recommendations  -  Encourage mobility, OOB in chair at least 3 hours per day, and early ambulation as appropriate   -  PT/OT evaluate and treat  -  SLP speech and cognitive evaluate and treat  -  Monitor sleep disturbances and establish consistent sleep-wake cycle  -  Monitor for bowel and bladder dysfunction  -  Monitor for shoulder pain and subluxation  -  Monitor for spasticity  -  Monitor for and prevent skin breakdown and pressure ulcers  · Early mobility, repositioning/weight shifting every 20-30 minutes when sitting, turn patient every 2 hours, proper mattress/overlay and chair cushioning, pressure relief/heel protector boots  -  DVT prophylaxis:  SCDs  -  Reviewed discharge options (IP rehab, SNF, HH therapy, and OP therapy)          Patient approved for [facility].  Insurance pending admission.  Transfer to rehab when insurance clears and she is medically ready for discharge.      Mary Ann Sanchez NP  Department of Physical Medicine & Rehab   [facility]

## 2017-10-10 NOTE — PLAN OF CARE
Problem: Physical Therapy Goal  Goal: Physical Therapy Goal  Goals to be met by: 10/20/2017     Patient will increase functional independence with mobility by performin. Sit to stand transfer with Contact Guard Assistance using RW  2. Bed to chair transfer with Contact Guard Assistance using Rolling Walker  3. Gait  x 100 feet with Minimal Assistance using Rolling Walker.   4. Ascend/descend 8 stairs with bilateral Handrails using min assist to access home environment safely.   5. Stand for 10 minutes with Contact Guard Assistance and no posterior LOB using Rolling Walker while performing dynamic UE tasks           Goals remain appropriate.     Opal Metcalf, PTA.  10/10/2017

## 2017-10-10 NOTE — TELEPHONE ENCOUNTER
Advised patient I would speak with Neurology and let him know if Neurosurgery has been consulted for poss recurrence of brain CA.    ----- Message from Maya Young sent at 10/10/2017  8:23 AM CDT -----  Contact: Basil (son) @ [phone]  Pt had a seizure and was admitted over the weekend.  pts son would like to see if Dr Ferrera can get with the Neurologist to go over pts test results.  pls call.

## 2017-10-10 NOTE — ASSESSMENT & PLAN NOTE
A 72 year old female who p/w posterior circulation sx and found to have R cerebellar stroke on MRI 2/2 R SCA occlusion on MRA. She continue to improve and doing well. PT/OT recs for [facility].       -- LDL 83, TG 63, A1C 4.1 and TSH 2.9  -- Anticoag: On Xarelto  -- Statins for secondary stroke prevention and hyperlipidemia, if present: Atorvastatin- 40 mg oral daily  -- Aggressive risk factor modification: Hypertension  -- Rehab Efforts: Physical Therapy and Occupational Therapy; [facility]  -- She is accepted to [facility] pending insurance approval

## 2017-10-10 NOTE — TELEPHONE ENCOUNTER
----- Message from Kim Holt sent at 10/10/2017  9:26 AM CDT -----  Contact: Pt son Basil  Pt son calling to inform  that she was admitted on Sunday for a stroke     Basil call back number  [phone]

## 2017-10-10 NOTE — PROGRESS NOTES
[facility]  Vascular Neurology  Comprehensive Stroke Center  Progress Note    Assessment/Plan:     10/10: No acute events overnight and no issues per nursing. Stable on exam. All labs WNL. 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 She continue to improve and doing well. PT/OT recs for [facility].     -- LDL 83, TG 63, A1C 4.1 and TSH 2.9  -- Anticoag: On Xarelto  -- Statins for secondary stroke prevention and hyperlipidemia, if present: Atorvastatin- 40 mg oral daily  -- Aggressive risk factor modification: Hypertension  -- Rehab Efforts: Physical Therapy and Occupational Therapy; [facility]  -- She is accepted to [facility] pending insurance approval       Visual disturbance    -- Resolved since admit      Vertigo    2/2 posterior circulation stroke   -- Improving and getting better       Brain metastases    She had of pan ca with mets s/p crani for resection. She fond to have nodular enhancement involving the right precentral gyrus concerning for possible residual vs recurrence   -- NSGY and Oncology updated       HTN (hypertension)    -- Keep SBP <160  -- Continue home medication           Neurologic Chief Complaint: Vertigo    Subjective:     Interval History: No acute events overnight and no issues per nursing. Stable on exam. All labs WNL. Tolerating PO intake and sating well at RA.Ready and medically stable for discharge to [facility] pending insuarnce approval      HPI, Past Medical, Family, and Social History remains the same as documented in the  initial encounter.     Review of Systems  She denies any complaint this am      Scheduled Meds:   amitriptyline  50 mg Oral QHS    amlodipine  5 mg Oral Daily    atorvastatin  40 mg Oral Daily    erlotinib  150 mg Oral Daily    levetiracetam  750 mg Oral BID    lipase-protease-amylase 12,000-38,000-60,000 units  1 capsule Oral TID WM    metoprolol succinate  50 mg Oral Daily    rivaroxaban  20 mg Oral Daily with dinner    sodium chloride 0.9%  3 mL Intravenous Q8H     Continuous Infusions:   sodium chloride 0.9%       PRN Meds:influenza, labetalol, lorazepam, ondansetron, promethazine, sodium chloride 0.9%    Objective:     Vital Signs (Most Recent):  Temp: 97.3 °F (36.3 °C) (10/10/17 1153)  Pulse: 66 (10/10/17 1200)  Resp: 18 (10/10/17 1153)  BP: 103/63 (10/10/17 1153)  SpO2: (!) 92 % (10/10/17 0826)  BP Location: Right arm    Vital Signs Range (Last 24H):  Temp:  [96.5 °F (35.8 °C)-98.6 °F (37 °C)]   Pulse:  [64-73]   Resp:  [12-18]   BP: (103-145)/(58-67)   SpO2:  [92 %-95 %]   BP Location: Right arm    Physical Exam  General: Well developed, well nourished female in NAD  HEENT: Conjunctiva clear; Oropharynx clear  Neck: No JVD noted, Supple  CV: Normal S1, S2 with no murmurs,gallops,rubs  Resp: Lungs CTA Bilaterally, Unlabored  Abdomen: NTND, BS normoactive x4 quads, soft  Extrem: No cyanosis, clubbing, edema.  Skin: No rashes, lesions, ulcers      Neurological Exam:   LOC: alert and follows requests  Language: No aphasia  Speech: No dysarthria  Orientation: Person, Place, Time  Memory: Recent memory intact, Remote memory intact, Age correct, Month correct  Visual Fields (CN II): Full  EOM (CN III, IV, VI): Full/intact  Pupils (CN III, IV, VI): PERRL  Facial Movement (CN VII): symmetric facial expression  Gag Reflex (CN IX, X): normal/symmetric  Shoulder/Neck (CN XI): SCM-Left: Normal ; SCM-Right: Normal ; Shoulder Shrug: Normal/Symetric  Tongue (CN XII): to midline  Reflexes: flexor plantar responses  bilaterally  Motor*: Arm Left:  Normal (5/5), Leg Left:   Normal (5/5), Arm Right:   Normal (5/5), Leg Right:   Normal (5/5)  Cerebellar*:Mild R arin-ataxia and moderate gait ataxia  Sensation: intact to light touch, temperature and vibration  Tone: Arm-Left: normal; Leg-Left: normal; Arm-Right: normal; Leg-Right: normal    Stroke Scales  Interval: baseline (upon arrival/admit)  Level of Consciousness: 0 - alert  LOC Questions: 0 - answers both correctly  LOC Commands: 0 - performs both correctly  Best Gaze: 0 - normal  Visual: 0 - no visual loss  Facial Palsy: 0 - normal  Motor Left Arm: 0 - no drift  Motor Right Arm: 0 - no drift  Motor Left Le - no drift  Motor Right Le - no drift  Limb Ataxia: 1 - present in one limb  Sensory: 0 - normal  Best Language: 0 - no aphasia  Dysarthria: 0 - normal articulation  Extinction and Inattention: 0 - no neglect  NIH Stroke Scale Total: 1    Laboratory:  CMP:     Recent Labs  Lab 10/10/17  0449   CALCIUM 7.5*   ALBUMIN 2.2*   PROT 5.2*      K 3.9   CO2 18*   *   BUN 16   CREATININE 1.3   ALKPHOS 68   ALT 16   AST 22   BILITOT 1.5*     CBC:     Recent Labs  Lab 10/10/17  0449   WBC 7.16   RBC 3.53*   HGB 10.0*   HCT 32.0*      MCV 91   MCH 28.3   MCHC 31.3*     Lipid Panel:     Recent Labs  Lab 10/08/17  0619   CHOL 172   LDLCALC 83.4   HDL 76*   TRIG 63       Diagnostic Results:  I have personally reviewed: MRI Head. Date: 10/08/17  Findings: No enhancement within the right cerebellar signal abnormality compatible with acute infarction.    There is ill-defined subcentimeter nodular enhancement in the right precentral gyrus which is nonspecific and may represent subacute age area of infarction however in light of history new cortical metastases cannot be excluded. Clinical correlation and consideration for correlation with CSF analysis and short-term followup recommended.    There is continued pachymeningeal enhancement underlying the craniotomy with  continued somewhat nodular enhancement within overlying left frontal convexity overall reduced from prior. No evidence for new or increased enhancement in this region to suggest definite local worsening residual recurrent lesion.      Angie Gloria MD  [facility]  Department of Vascular Neurology   [facility]

## 2017-10-11 ENCOUNTER — HOSPITAL ENCOUNTER (INPATIENT)
Facility: HOSPITAL | Age: 72
LOS: 15 days | Discharge: NURSING, REHAB OR LONG-TERM CARE | DRG: 057 | End: 2017-10-26
Attending: PHYSICAL MEDICINE & REHABILITATION | Admitting: PHYSICAL MEDICINE & REHABILITATION
Payer: MEDICARE

## 2017-10-11 VITALS
TEMPERATURE: 97 F | BODY MASS INDEX: 22.5 KG/M2 | DIASTOLIC BLOOD PRESSURE: 59 MMHG | WEIGHT: 140 LBS | OXYGEN SATURATION: 98 % | RESPIRATION RATE: 18 BRPM | HEIGHT: 66 IN | SYSTOLIC BLOOD PRESSURE: 128 MMHG | HEART RATE: 67 BPM

## 2017-10-11 DIAGNOSIS — I63.9 CEREBELLAR INFARCT: ICD-10-CM

## 2017-10-11 DIAGNOSIS — I63.9 RIGHT-SIDED CEREBROVASCULAR ACCIDENT (CVA): ICD-10-CM

## 2017-10-11 DIAGNOSIS — I63.441 CEREBRAL INFARCTION DUE TO EMBOLISM OF RIGHT CEREBELLAR ARTERY: ICD-10-CM

## 2017-10-11 DIAGNOSIS — R27.0 ATAXIA: Primary | ICD-10-CM

## 2017-10-11 PROBLEM — H53.9 VISUAL DISTURBANCE: Status: RESOLVED | Noted: 2017-10-08 | Resolved: 2017-10-11

## 2017-10-11 PROCEDURE — 97112 NEUROMUSCULAR REEDUCATION: CPT

## 2017-10-11 PROCEDURE — 97116 GAIT TRAINING THERAPY: CPT

## 2017-10-11 PROCEDURE — 92507 TX SP LANG VOICE COMM INDIV: CPT

## 2017-10-11 PROCEDURE — 97535 SELF CARE MNGMENT TRAINING: CPT

## 2017-10-11 PROCEDURE — 25000003 PHARM REV CODE 250: Performed by: STUDENT IN AN ORGANIZED HEALTH CARE EDUCATION/TRAINING PROGRAM

## 2017-10-11 PROCEDURE — 97530 THERAPEUTIC ACTIVITIES: CPT

## 2017-10-11 PROCEDURE — G8989 SELF CARE D/C STATUS: HCPCS | Mod: CL

## 2017-10-11 PROCEDURE — 99233 SBSQ HOSP IP/OBS HIGH 50: CPT | Mod: GC,,, | Performed by: PSYCHIATRY & NEUROLOGY

## 2017-10-11 PROCEDURE — 92526 ORAL FUNCTION THERAPY: CPT

## 2017-10-11 PROCEDURE — 25000003 PHARM REV CODE 250: Performed by: PHYSICIAN ASSISTANT

## 2017-10-11 PROCEDURE — 12800000 HC REHAB SEMI-PRIVATE ROOM

## 2017-10-11 PROCEDURE — 99232 SBSQ HOSP IP/OBS MODERATE 35: CPT | Mod: ,,, | Performed by: NURSE PRACTITIONER

## 2017-10-11 PROCEDURE — A4216 STERILE WATER/SALINE, 10 ML: HCPCS | Performed by: STUDENT IN AN ORGANIZED HEALTH CARE EDUCATION/TRAINING PROGRAM

## 2017-10-11 PROCEDURE — G8988 SELF CARE GOAL STATUS: HCPCS | Mod: CK

## 2017-10-11 RX ORDER — METOPROLOL SUCCINATE 50 MG/1
50 TABLET, EXTENDED RELEASE ORAL DAILY
Status: CANCELLED | OUTPATIENT
Start: 2017-10-12

## 2017-10-11 RX ORDER — AMLODIPINE BESYLATE 5 MG/1
5 TABLET ORAL DAILY
Status: CANCELLED | OUTPATIENT
Start: 2017-10-12

## 2017-10-11 RX ORDER — ERLOTINIB HYDROCHLORIDE 150 MG/1
150 TABLET, FILM COATED ORAL DAILY
Qty: 60 TABLET | Refills: 12 | Status: ON HOLD | OUTPATIENT
Start: 2017-10-11 | End: 2017-10-23 | Stop reason: SDUPTHER

## 2017-10-11 RX ORDER — LORAZEPAM 1 MG/1
1 TABLET ORAL EVERY 6 HOURS PRN
Status: CANCELLED | OUTPATIENT
Start: 2017-10-11

## 2017-10-11 RX ORDER — RAMELTEON 8 MG/1
8 TABLET ORAL NIGHTLY PRN
Status: DISCONTINUED | OUTPATIENT
Start: 2017-10-11 | End: 2017-10-26 | Stop reason: HOSPADM

## 2017-10-11 RX ORDER — METOPROLOL SUCCINATE 50 MG/1
50 TABLET, EXTENDED RELEASE ORAL DAILY
Status: DISCONTINUED | OUTPATIENT
Start: 2017-10-12 | End: 2017-10-26 | Stop reason: HOSPADM

## 2017-10-11 RX ORDER — AMITRIPTYLINE HYDROCHLORIDE 25 MG/1
50 TABLET, FILM COATED ORAL NIGHTLY
Status: DISCONTINUED | OUTPATIENT
Start: 2017-10-11 | End: 2017-10-26 | Stop reason: HOSPADM

## 2017-10-11 RX ORDER — ONDANSETRON 4 MG/1
4 TABLET, ORALLY DISINTEGRATING ORAL EVERY 6 HOURS PRN
Status: DISCONTINUED | OUTPATIENT
Start: 2017-10-11 | End: 2017-10-26 | Stop reason: HOSPADM

## 2017-10-11 RX ORDER — PROMETHAZINE HYDROCHLORIDE 12.5 MG/1
12.5 TABLET ORAL EVERY 6 HOURS PRN
Status: CANCELLED | OUTPATIENT
Start: 2017-10-11

## 2017-10-11 RX ORDER — ATORVASTATIN CALCIUM 20 MG/1
40 TABLET, FILM COATED ORAL DAILY
Status: DISCONTINUED | OUTPATIENT
Start: 2017-10-12 | End: 2017-10-26 | Stop reason: HOSPADM

## 2017-10-11 RX ORDER — ONDANSETRON 4 MG/1
4 TABLET, ORALLY DISINTEGRATING ORAL EVERY 6 HOURS PRN
Status: CANCELLED | OUTPATIENT
Start: 2017-10-11

## 2017-10-11 RX ORDER — ADHESIVE BANDAGE
30 BANDAGE TOPICAL DAILY PRN
Status: DISCONTINUED | OUTPATIENT
Start: 2017-10-11 | End: 2017-10-26 | Stop reason: HOSPADM

## 2017-10-11 RX ORDER — PROMETHAZINE HYDROCHLORIDE 12.5 MG/1
12.5 TABLET ORAL EVERY 6 HOURS PRN
Status: DISCONTINUED | OUTPATIENT
Start: 2017-10-11 | End: 2017-10-26 | Stop reason: HOSPADM

## 2017-10-11 RX ORDER — ATORVASTATIN CALCIUM 20 MG/1
40 TABLET, FILM COATED ORAL DAILY
Status: CANCELLED | OUTPATIENT
Start: 2017-10-12

## 2017-10-11 RX ORDER — AMITRIPTYLINE HYDROCHLORIDE 50 MG/1
50 TABLET, FILM COATED ORAL NIGHTLY
Status: CANCELLED | OUTPATIENT
Start: 2017-10-11

## 2017-10-11 RX ORDER — LORAZEPAM 1 MG/1
1 TABLET ORAL EVERY 6 HOURS PRN
Status: DISCONTINUED | OUTPATIENT
Start: 2017-10-11 | End: 2017-10-26 | Stop reason: HOSPADM

## 2017-10-11 RX ORDER — AMLODIPINE BESYLATE 5 MG/1
5 TABLET ORAL DAILY
Status: DISCONTINUED | OUTPATIENT
Start: 2017-10-12 | End: 2017-10-26 | Stop reason: HOSPADM

## 2017-10-11 RX ORDER — LEVETIRACETAM 750 MG/1
750 TABLET ORAL 2 TIMES DAILY
Status: DISCONTINUED | OUTPATIENT
Start: 2017-10-11 | End: 2017-10-26 | Stop reason: HOSPADM

## 2017-10-11 RX ORDER — BISACODYL 10 MG
10 SUPPOSITORY, RECTAL RECTAL DAILY PRN
Status: DISCONTINUED | OUTPATIENT
Start: 2017-10-11 | End: 2017-10-26 | Stop reason: HOSPADM

## 2017-10-11 RX ADMIN — Medication 3 ML: at 05:10

## 2017-10-11 RX ADMIN — LORAZEPAM 1 MG: 1 TABLET ORAL at 05:10

## 2017-10-11 RX ADMIN — AMITRIPTYLINE HYDROCHLORIDE 50 MG: 25 TABLET, FILM COATED ORAL at 09:10

## 2017-10-11 RX ADMIN — LEVETIRACETAM 500 MG: 250 TABLET, FILM COATED ORAL at 10:10

## 2017-10-11 RX ADMIN — LEVETIRACETAM 750 MG: 750 TABLET ORAL at 08:10

## 2017-10-11 RX ADMIN — AMLODIPINE BESYLATE 5 MG: 5 TABLET ORAL at 10:10

## 2017-10-11 RX ADMIN — ERLOTINIB HYDROCHLORIDE 150 MG: 150 TABLET ORAL at 10:10

## 2017-10-11 RX ADMIN — RIVAROXABAN 20 MG: 20 TABLET, FILM COATED ORAL at 06:10

## 2017-10-11 RX ADMIN — METOPROLOL SUCCINATE 50 MG: 50 TABLET, EXTENDED RELEASE ORAL at 10:10

## 2017-10-11 RX ADMIN — PANCRELIPASE 1 CAPSULE: 60000; 12000; 38000 CAPSULE, DELAYED RELEASE PELLETS ORAL at 07:10

## 2017-10-11 RX ADMIN — ATORVASTATIN CALCIUM 40 MG: 20 TABLET, FILM COATED ORAL at 10:10

## 2017-10-11 NOTE — ASSESSMENT & PLAN NOTE
A 72 year old female who p/w posterior circulation sx and found to have R cerebellar stroke on MRI 2/2 R SCA occlusion on MRA. She continue to improve and doing well. PT/OT recs for [facility].       -- LDL 83, TG 63, A1C 4.1 and TSH 2.9  -- Anticoag: On Xarelto  -- Statins for secondary stroke prevention and hyperlipidemia, if present: Atorvastatin- 40 mg oral daily  -- Aggressive risk factor modification: Hypertension  -- Rehab Efforts: Physical Therapy and Occupational Therapy; [facility]  -- She is accepted to [facility] and will discharge today

## 2017-10-11 NOTE — PT/OT/SLP DISCHARGE
Physical Therapy Discharge Summary    Naty St  MRN: [id number]   Cerebellar infarct     Patient Discharged from acute Physical Therapy on 10/11/2017.  Please refer to prior PT noted date on 10/11/2017 for functional status.     Assessment:   Patient appropriate for care in another setting.  GOALS:    Physical Therapy Goals        Problem: Physical Therapy Goal    Goal Priority Disciplines Outcome Goal Variances Interventions   Physical Therapy Goal     PT/OT, PT Ongoing (interventions implemented as appropriate)     Description:  Goals to be met by: 10/20/2017     Patient will increase functional independence with mobility by performin. Sit to stand transfer with SBA using RW (CGA using RW met 10/11)  2. Bed to chair transfer with SBA using Rolling Walker (CGA using RW 10/11)  3. Gait x100 feet with Minimal Assistance using Rolling Walker   4. Ascend/descend 8 stairs with bilateral Handrails using min assist to access home environment safely.   5. Stand for 10 minutes with Contact Guard Assistance and no posterior LOB using Rolling Walker while performing dynamic UE tasks                     Reasons for Discontinuation of Therapy Services  Transfer to alternate level of care.      Plan:  Patient Discharged to: Inpatient Rehab.    Emilia Villegas, PT, DPT  [phone]  10/11/2017

## 2017-10-11 NOTE — PLAN OF CARE
10/11/17 1302   Final Note   Assessment Type Final Discharge Note   Discharge Disposition Rehab     Patient is discharged to [facility]. Bedside Rn notified to call report. Transport setup and family notified per SW.

## 2017-10-11 NOTE — PT/OT/SLP PROGRESS
"Physical Therapy  Treatment    Naty St   MRN: [id number]   Admitting Diagnosis: Cerebellar infarct    PT Received On: 10/11/17  PT Start Time: 0944     PT Stop Time: 1009    PT Total Time (min): 25 min       Billable Minutes:  Gait Training 15 and Therapeutic Activity 10    Treatment Type: Treatment  PT/PTA: PT       General Precautions: Standard, aspiration, fall    Subjective:  Communicated with RN (Jenna) prior to session.    "I am learning what I need to do to get all this done." pt states "There has just been so much in and out."    Pain/Comfort  Pain Rating 1: 0/10  Pain Rating Post-Intervention 1: 0/10    Objective:   Patient found with: telemetry    Functional Mobility:  Bed Mobility:   Rolling/Turning Right: Contact guard assistance  Scooting/Bridging: Contact Guard Assistance  Supine to Sit: Contact Guard Assistance  Sit to Supine:  (pt remained sitting UIC)    Transfers:  Sit <> Stand Assistance: Minimum Assistance, Contact Guard Assistance (from EOB, from bedside commode, from W/C x6 trials total throughout session; VC/TCs for initial conditions, hand placment, sequencing, and anterior WS)  Sit <> Stand Assistive Device: No Assistive Device  Bed <> Chair Technique: Stand Pivot  Bed <> Chair Assistance: Minimum Assistance  Bed <> Chair Assistive Device: No Assistive Device    Gait:   Gait Distance: Pt ambulated 2x80 feet in hallway setting with multiple distractions. Pt req Min-Mod A to maintain RW on floor and for increased step length with facilitation for appropriate WS to right and L and for B heel strike. Pt with multiple LOB req mod A from PT for balance recovery. Upon return to room, pt ambulated x10 feet to bedside chair with no AD and Mod A from PT for WS, sequencing and safety.  Assistance 1: Minimum assistance, Moderate assistance  Gait Assistive Device: No device, Rolling walker  Gait Pattern: reciprocal  Gait Deviation(s): decreased leila, increased time in double stance, " decreased weight-shifting ability, foot flat, forward lean, lateral lean    Balance:   Static Sit: GOOD: Takes MODERATE challenges from all directions  Dynamic Sit: GOOD-: Maintains balance through MODERATE excursions of active trunk movement,     Static Stand: POOR+: Needs MINIMAL assist to maintain  Dynamic stand: POOR: N/A     Therapeutic Activities and Exercises:  PT arrived to pt's room to find pt resting quietly; agreeable to PT session. Pt requesting use of commode. Pt assisted to commode with min A. VCs for sequencing and appropriate WS. Pt able to perform self-care with min A for standing balance from PT. Pt performed mobility as above. Upon return to room, pt agreeable to remain UI. PT reviewed mobility needs and progression with therapy services. Pt agreeable to accept assist from staff for transfers. Questions/concerns addressed within PT scope of practice; pt and family with no further questions.    AM-PAC 6 CLICK MOBILITY  How much help from another person does this patient currently need?   1 = Unable, Total/Dependent Assistance  2 = A lot, Maximum/Moderate Assistance  3 = A little, Minimum/Contact Guard/Supervision  4 = None, Modified [place]/Independent    Turning over in bed (including adjusting bedclothes, sheets and blankets)?: 4  Sitting down on and standing up from a chair with arms (e.g., wheelchair, bedside commode, etc.): 3  Moving from lying on back to sitting on the side of the bed?: 4  Moving to and from a bed to a chair (including a wheelchair)?: 3  Need to walk in hospital room?: 2  Climbing 3-5 steps with a railing?: 2  Total Score: 18    AM-PAC Raw Score CMS G-Code Modifier Level of Impairment Assistance   6 % Total / Unable   7 - 9 CM 80 - 100% Maximal Assist   10 - 14 CL 60 - 80% Moderate Assist   15 - 19 CK 40 - 60% Moderate Assist   20 - 22 CJ 20 - 40% Minimal Assist   23 CI 1-20% SBA / CGA   24 CH 0% Independent/ Mod I     Patient left up in chair with all lines  intact, call button in reach, RN notified and family present.    Assessment:  Naty St is a 72 y.o. female with a medical diagnosis of Cerebellar infarct and presents with good participation with PT this date. Pt continues to present with ataxic and discoordinated gait pattern req mod A for mnmgnt of RW and for postural control. Pt currently req min-mod A for OOB activity; however, demo good safety awareness and improved body mechanics this date during functional tasks. 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 Pt requires increased assist from another person to complete basic mobility functions at this time. Pt is not at his baseline and will benefit from Rehab prior to D/C to return to PLOF.    Rehab identified problem list/impairments: Rehab identified problem list/impairments: weakness, impaired endurance, impaired self care skills, impaired functional mobilty, gait instability, impaired balance, impaired coordination    Rehab potential is good.    Activity tolerance: Good    Discharge recommendations: Discharge Facility/Level Of Care Needs: rehabilitation facility     Barriers to discharge: Barriers to Discharge: Inaccessible home environment, Decreased caregiver support    Equipment recommendations: Equipment Needed After Discharge: 3-in-1 commode, bath bench     GOALS:    Physical Therapy Goals        Problem: Physical Therapy Goal    Goal Priority Disciplines Outcome Goal Variances Interventions   Physical Therapy Goal     PT/OT, PT Ongoing (interventions implemented as appropriate)     Description:  Goals to be met by: 10/20/2017     Patient will increase functional independence with mobility by performin. Sit to stand transfer with SBA using RW (CGA using RW met 10/11)  2. Bed to chair transfer with SBA using Rolling Walker (CGA using RW 10/11)  3. Gait x100  feet with Minimal Assistance using Rolling Walker   4. Ascend/descend 8 stairs with bilateral Handrails using min assist to access home environment safely.   5. Stand for 10 minutes with Contact Guard Assistance and no posterior LOB using Rolling Walker while performing dynamic UE tasks                     PLAN:    Patient to be seen 6 x/week  to address the above listed problems via gait training, therapeutic activities, therapeutic exercises, neuromuscular re-education  Plan of Care expires: 11/08/17  Plan of Care reviewed with: patient, spouse, family     Emilia Villegas, PT, DPT  [phone]  10/11/2017

## 2017-10-11 NOTE — PLAN OF CARE
Problem: Occupational Therapy Goal  Goal: Occupational Therapy Goal  Goals set 10/9 to be addressed for 7 days with expiration date, 10/16:  Patient will increase functional independence with ADLs by performing:  Patient will demonstrate supine -sit with modified independence.   Not met  Patient will demonstrate stand pivot transfers with CGA.   Not met  Patient will demonstrate grooming while standing with CGA.   Not met  Patient will demonstrate upper body dressing with SBA while seated EOB.   Not met  Patient will demonstrate lower body dressing with min assist while seated EOB.   Not met  Patient will demonstrate toileting with CGA.   Not met  Patient will demonstrate bathing while seated EOB with CGA.   Not met  Patient's family / caregiver will demonstrate independence and safety with assisting patient with self-care skills and functional mobility.     Not met  Patient and/or patient's family will verbalize understanding of stroke prevention guidelines, personal risk factors and stroke warning signs via teachback method.  Not met          Goals remain appropriate.  LEOBARDO Iqbal  10/11/2017

## 2017-10-11 NOTE — NURSING
NURSE TECH FIM  REPORT    EATING  [x] Pt. opens packages, cuts food, pours liquids, and feeds self.  Regular consistency (7)  [] Pt. needs dentures, swallow technique, special foods or drink consistency (6)  [] Pt. needs supervision (cueing), set up (open containers, cut food, etc. (5)  [] Pt. needs assist with occasional scooping or checking for food pocketing (75-99%) (4)  [] Pt. needs assist to load each bite on utensils, pt.brings food to mouth (50-74%) (3)  [] Pt. needs assist to scoop, bring food to mouth (hand over hand) (25-49%) (2)  [] Pt. Is receiving IV fluids for hydration or tube feedings  (0-24%) (1)      GROOMING   [] Pt. performs all tasks independently and safely (7)  [] Pt. obtains all articles.  Needs adaptive/assistive device (such as wash mitt) (6)  [] Pt. needs supervision (cueing), set up (helper obtains articles, applies toothpaste, plugs razor, hands items to patient, opens containers, adjusts water temperature) (5)       [] Pt. doing 3 out of 4,  or 4 out of 5 tasks.  Needs assist to put in or remove dentures.  Needs steadying assist.(Pt. Doing 75-99%. (4)                                                                 [] Pt. doing 2 out of 4, or 3 out of 5 tasks (50-74%) (3)  [] Pt. doing 1 out of 4, or 2 out of 5 tasks (25-49%) (2)  [] Pt. doing 0 out of 4, or 1 out of 5 tasks or needs assistance of 2 helpers (0-24%) (1)  [] Activity done with OT      BATHING  [] Pt. safely bathes whole body (10 parts of 10) (7)  [] Pt. doing 10 out of 10 body parts.  Uses adaptive devices (such as wash mitt, long handled sponge, etc.) (6)  []  Pt. doing 8-9 out of 10 body parts , or pt. needs steadying assistance. (75-99%) (4)  []  Pt. doing 10 out of 10 body parts buts needs supervision or set up (5)  [] Pt. doing 5-7 out of 10 body parts (50-74%) (3)  [] Pt. doing 3-4 of out 10 body parts (25-49%) (2)  [] Pt. doing 0-2 out of 10 body parts or needs assist of two helpers. (0-24%) (1)    [] Activity done  with OT      DRESSING UPPER BODY  [] Pt. dresses independently and undresses independently, obtaining clothes from          storage area (7)  [] Pt. needs adaptive devices (such as Velcro fastener, reacher, button hook, etc.)          Applies abdominal binder or any orthotic.  Uses  walker for steadying. (6)  [] Pt. needs supervision (cueing), set up (helper brings clothes or applies orthotics (such as abdominal binder, TLSO, cervical collar) (5)  [] Pt. doing 75-99%. (4)  [] Pt. doing 50-74%. (3)  [] Pt. doing 25-49%. (2)  [] Pt. doing 0-24%, or needs assistance of 2 helpers. (1)  [] Activity done with OT.      DRESSING LOWER BODY  [] Pt. independent with all parts of dressing lower body. (7)  [] Pt. needs adaptive devices ( such as reacher, long handled shoe horn, sock aid) (6)  [] Pt. needs supervision (cueing), set up(helper brings clothes or applies orthotic, such   as TALON hose, AFO) (5)  [] Pt. doing 75-99%.  Needs steadying assistance: buttoning pants, zipping a zipper,        fastening a belt, tying shoelaces, applying one sock/shoe. (4)  [] Pt. doing 50-74%. (3)  [] Pt. doing 25-49%. (2)  [] Pt. Doing 0-24%, or needs assistance of 2 helpers. (1)  [] Activity done with OT.      TOILETING  [] Pt. doing 3 out of 3 tasks independently (pulling down clothes, cleansing elizabeth area,  pulling up clothes) (7)  [] Pt. doing 3 out of 3 tasks, but needs assistive device (grab bars, etc.) (6)  [] Pt. needs supervision (cueing), or set up (obtaining supplies for elizabeth care.) (5)  [] Pt. doing 3 out of 3 tasks, but needs steadying assistance with one or more parts. (75-90%) (4)  [] Pt. doing 2 out of 3 tasks (50-74%) (3)  [] Pt. doing out of 3 tasks (25-49%) (2)  [x] Pt. needs assist (more than steadying) with all 3 tasks.  Needs assist of 2 helpers.    Incontinent of B/B today. (0-24%) (1)  [] Activity did not occur.      BLADDER  [] Pt.  uses toilet (7)  [] Pt. is on dialysis - does not urinate (7)  [x] Pt. uses bedside  commode (5)  [] Pt. uses bedpan - staff does ____% of tasks(includes rolling on/off, holding bedpan in place, hygiene, and emptying pan)   Pt. uses urinal - staff empties (5)                     []  Pt. needs help with positioning the urinal (4)                     []  Pt. needs help holding the urinal (1)  [] Pt. has lazo catheter/suprapubic catheter/concom cath - staff empties (1)  [] Pt. is on ICP:                     []  Pt. does catheterization (6)                     []  Staff does catheterization (1)  [] Pt. is incontinent - staff does ____% of tasks (includes clothes management,  hygiene, and changing diaper)  [] Number of accidents during this shift ____       BOWEL  [] Pt. uses toilet (7)  [] Pt. uses bedside commode (5)  [] Pt. uses bedpan - staff does ____% of tasks (includes rolling on/off, holding bedpan                                                          In place, hygiene, and emptying pan)  [] Pt. on bowel program:                    [] Pt.inserts suppository (6)                    [] Nurse inserts suppository (4)                    []  Nurse does dig. stim (1)  [] Pt. has colostomy - staff maintains care and empties (1)                    [] Pt. does ____% of care for colostomy  [] Pt. is incontinent - staff does ____% of tasks (includes clothes management,  hygiene, and changing diaper)  [] Number of accidents during this shift____  [x] No bowel movement this shift      TRANSFERS:  BED/CHAIR/WHEELCHAIR  [] Pt. transfers without assistive device into and out of wheelchair/bed/chair (7)  [] Pt. uses assistive/adaptive devices (grab bars, sliding board, walker, bed rails,  wheelchair armrests, etc.) (6)  [] Pt. needs supervision, cues, or head of bed raised by staff (5)  [x] Pt. needs steadying assist with any or all parts of transfer, or needs assist with one  leg during transfer (4)  [] Pt. needs lifting or lowering, or needs assist with 2 legs during transfer (3)  [] Pt. needs lifting and  lowering during transfer (2)  [] Pt. needs assist of 2 helpers or mechanical lift (1)  [] Activity did not occur   ________________________________________________________________  Includes lying to seated position at edge of bed.  Check assist level needed:  [] Used bedrails              []  Supervision                   [x]   Min. A  [] Mod A                          []  Max A                            []  Total A    If in wheelchair:  Check assist level needed:  [] Lock brakes                 []  Lifts/lowers footrests       []  Removes w/c arm                                                                                (for slide board transfers)    TRANSFER:  TOILET/BEDSIDE COMMODE   [] Pt. transfers from wheelchair or walking without assistive device to toilet.  Gets on and off a standard toilet. (7)  [] Pt. uses assistive/adaptive device (commode, grab bars, sliding board, walker prosthesis, orthotic, raised toilet seat) (6)  [] Pt. needs supervision, cues, or set up (needs assist to lock brakes, remove leg or arm rest, position sliding board) (5)  [x] Pt. needs steadying assist with any or all parts of transfer or needs assist with one leg during transfer. (4)  [] Pt. needs lifting or lowering or needs assist with two legs during transfer. (3)  [] Pt. needs lifting and lowering during transfer. (2)  [] Pt. needs assist of 2 helpers or mechanical lift. (1)  [] Activity did not occur.      TRANSFER:  SHOWER  [] Pt. transfers without assistive device into and out of shower. (7)  [] Pt. uses assistive/adaptive device (shower chair/bench, grab bars, sliding board,   walker, prothesis, orthotic, raised toilet seat. (6)  [] Pt. needs supervision, cues, or set up (needs assist to lock brakes, remove leg or armrest, position sliding board) (5)  [] Pt. needs steadying assist with any or all parts of transfer or needs assist with one      leg during transfer. (4)  [] Pt. needs lifting or lowering or needs assist  with two legs during transfer. (3)  [] Pt. needs lifting and lowering during transfer. (2)  [] Pt. needs assist of 2 helpers.  [place] pushes shower chair on wheels in and out of   shower. (1)  [] Activity did not occur.                                                                                                                                                                                 r                                                                                                                    pT.

## 2017-10-11 NOTE — PT/OT/SLP PROGRESS
"Occupational Therapy  Treatment    Naty St   MRN: [id number]   Admitting Diagnosis: Cerebellar infarct    OT Date of Treatment: 10/11/17   OT Start Time: 0803  OT Stop Time: 0841  OT Total Time (min): 38 min    Billable Minutes:  Self Care/Home Management 14, Therapeutic Activity 10 and Neuromuscular Re-education 14    General Precautions: Standard, aspiration, fall  Orthopedic Precautions: N/A  Braces: N/A    Do you have any cultural, spiritual, Yarsani conflicts, given your current situation?: Christianity    Subjective:  Communicated with nurse prior to session.  Patient:  "That wasn't as easy as I thought it would be."--referring to handwriting.  "I sat up 5-6 hours yesterday."    Pain/Comfort  Pain Rating 1: 0/10  Pain Rating Post-Intervention 1: 0/10    Objective:  Patient found with: telemetry   present.      Functional Mobility:  Bed Mobility:  Rolling/Turning to Left: Modified independent  Rolling/Turning Right: Modified independent    Transfers:   Sit <> Stand Assistance: Minimum Assistance  Sit <> Stand Assistive Device: No Assistive Device  Bed <> Chair Technique: Stand Pivot  Bed <> Chair Transfer Assistance:  (varies Min-Mod assist)    Activities of Daily Living:  UE Dressing Level of Assistance: Contact guard (assistance for postural control)  LE Dressing Level of Assistance: Moderate assistance (assistance for standing balance)  Grooming Position: Standing  Grooming Level of Assistance: Moderate assistance (assistance for standing balance)      Additional Treatment:   Patient/family education provided on role of OT and need for rehab upon discharge.  Patient verbalizing understanding via teach back method. Patient and family instructed on need to call for assistance for toileting needs and when getting up.  Continued education, patient/ family training recommended.  Patient alert and oriented x 3; able to follow 4/4 one step commands.  Min-Moderate assist required with postural " "control while standing.  Addressed [facility] and handwriting while seated EOB.  Addressed core stabilization ex while seated and standing; patient with increased weight shift posteriorly.  Patient's functional status and disposition recommendation discussed with stroke team in daily rounds.  White board updated in patient's room.  OT asked if there were any other questions; patient/ family had no further questions.    AM-PAC 6 CLICK ADL   How much help from another person does this patient currently need?   1 = Unable, Total/Dependent Assistance  2 = A lot, Maximum/Moderate Assistance  3 = A little, Minimum/Contact Guard/Supervision  4 = None, Modified [place]/Independent    Putting on and taking off regular lower body clothing? : 2  Bathing (including washing, rinsing, drying)?: 2  Toileting, which includes using toilet, bedpan, or urinal? : 2  Putting on and taking off regular upper body clothing?: 3  Taking care of personal grooming such as brushing teeth?: 2  Eating meals?: 3  Total Score: 14     AM-PAC Raw Score CMS "G-Code Modifier Level of Impairment Assistance   6 % Total / Unable   7 - 8 CM 80 - 100% Maximal Assist   9-13 CL 60 - 80% Moderate Assist   14 - 19 CK 40 - 60% Moderate Assist   20 - 22 CJ 20 - 40% Minimal Assist   23 CI 1-20% SBA / CGA   24 CH 0% Independent/ Mod I       Patient left supine with all lines intact and call button in reach    ASSESSMENT:  Naty St is a 72 y.o. female with a medical diagnosis of  r/o stroke (suspected right cerebellar infarct) and presents with performance deficits of physical skills including impaired  balance, mobility, dexterity, fine motor coordination, gross motor coordination, and endurance; demonstrating performance deficits of cognitive skills including impaired problem solving, sequencing and memory all resulting in inability organizing occupational performance in a timely and safe manner.  These performance deficits have resulted in " activity limitations including but not limited to:   transfers, ascending/ descending stairs, walking short and long distances, walking around obstacles, transitional movement patterns (kneeling, bending); eating, upper body dressing, lower body dressing, brushing teeth, toileting, bathing, carrying objects, writing, and driving.   Patient's role as mother, wife, grand mother and independent caretaker for self has been affected. Patient will benefit from skilled OT services to maximize level of independence with self-care skills and functional mobility.  Will benefit from rehab.    Rehab identified problem list/impairments: Rehab identified problem list/impairments: weakness, impaired endurance, impaired self care skills, gait instability, impaired functional mobilty, impaired cognition, decreased safety awareness, impaired fine motor, impaired coordination    Rehab potential is good.    Activity tolerance: Good    Discharge recommendations: Discharge Facility/Level Of Care Needs: rehabilitation facility     Barriers to discharge: Barriers to Discharge: Inaccessible home environment, Decreased caregiver support    Equipment recommendations: 3-in-1 commode, bath bench     GOALS:    Occupational Therapy Goals        Problem: Occupational Therapy Goal    Goal Priority Disciplines Outcome Interventions   Occupational Therapy Goal     OT, PT/OT     Description:  Goals set 10/9 to be addressed for 7 days with expiration date, 10/16:  Patient will increase functional independence with ADLs by performing:  Patient will demonstrate supine -sit with modified independence.   Not met  Patient will demonstrate stand pivot transfers with CGA.   Not met  Patient will demonstrate grooming while standing with CGA.   Not met  Patient will demonstrate upper body dressing with SBA while seated EOB.   Not met  Patient will demonstrate lower body dressing with min assist while seated EOB.   Not met  Patient will demonstrate toileting  with CGA.   Not met  Patient will demonstrate bathing while seated EOB with CGA.   Not met  Patient's family / caregiver will demonstrate independence and safety with assisting patient with self-care skills and functional mobility.     Not met  Patient and/or patient's family will verbalize understanding of stroke prevention guidelines, personal risk factors and stroke warning signs via teachback method.  Not met                           Plan:  Patient to be seen 6 x/week to address the above listed problems via self-care/home management, cognitive retraining, sensory integration, therapeutic activities, therapeutic exercises, neuromuscular re-education  Plan of Care expires: 11/06/17  Plan of Care reviewed with: patient         Kenyatta LEOBARDO Zee  10/11/2017

## 2017-10-11 NOTE — PROGRESS NOTES
[facility]  Vascular Neurology  Comprehensive Stroke Center  Progress Note    Assessment/Plan:     10/10: No acute events overnight and no issues per nursing. Stable on exam. All labs WNL. Tolerating PO intake and sating well at RA.Ready and medically stable for discharge to [facility] pending insuarnce approval  10/11: NAEON. No issues per nursing. Neuroexam unchanged. BP well controlled and sating well at RA. Tolerating PO intake very well. Working with PT/OT and ambulating.Voiding approprietly. She got accepted to [facility]. She is ready and medically stable to be discharge to [facility] today.    * Right Cerebellar infarct    -- See above        Ataxia    2/2 R cerebellar stroke  -- Continue PT/OT and gait training       Cytotoxic cerebral edema    -- Noted on imaging without MLS  -- Continue neurocheck       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    A 72 year old female who p/w posterior circulation sx and found to have R cerebellar stroke on MRI 2/2 R SCA occlusion on MRA. She continue to improve and doing well. PT/OT recs for [facility].       -- LDL 83, TG 63, A1C 4.1 and TSH 2.9  -- Anticoag: On Xarelto  -- Statins for secondary stroke prevention and hyperlipidemia, if present: Atorvastatin- 40 mg oral daily  -- Aggressive risk factor modification: Hypertension  -- Rehab Efforts: Physical Therapy and Occupational Therapy; [facility]  -- She is accepted to [facility] and will discharge today       Vertigo    2/2 posterior circulation stroke   -- Improving and getting better      Brain metastases    She had of pan ca with mets s/p crani for resection. She fond to have nodular enhancement involving the right precentral gyrus concerning for possible residual vs recurrence   -- NSGY and Oncology updated       HTN (hypertension)    -- Keep SBP <160  -- Continue home medication           Neurologic Chief Complaint: Vertigo    Subjective:     Interval History:   NAEON. No issues per nursing. Neuroexam  unchanged. HDS and BP well controlled and sating well at RA. Tolerating PO intake very well. Working with PT/OT and ambulating.Voiding approprietly. All labs WNL. She got accepted to [facility]. She is ready and medically stable to be discharge to [facility] today.      HPI, Past Medical, Family, and Social History remains the same as documented in the initial encounter.     Review of Systems  She denies any complaint this am      Scheduled Meds:   amitriptyline  50 mg Oral QHS    amlodipine  5 mg Oral Daily    atorvastatin  40 mg Oral Daily    erlotinib  150 mg Oral Daily    levetiracetam  750 mg Oral BID    lipase-protease-amylase 12,000-38,000-60,000 units  1 capsule Oral TID WM    metoprolol succinate  50 mg Oral Daily    rivaroxaban  20 mg Oral Daily with dinner    sodium chloride 0.9%  3 mL Intravenous Q8H     Continuous Infusions:   sodium chloride 0.9%       PRN Meds:influenza, labetalol, lorazepam, ondansetron, promethazine, sodium chloride 0.9%    Objective:     Vital Signs (Most Recent):  Temp: 98.3 °F (36.8 °C) (10/11/17 0430)  Pulse: 68 (10/11/17 0700)  Resp: 16 (10/10/17 2000)  BP: (!) 113/56 (10/11/17 0430)  SpO2: 96 % (10/11/17 0430)  BP Location: Left arm    Vital Signs Range (Last 24H):  Temp:  [97.3 °F (36.3 °C)-99.8 °F (37.7 °C)]   Pulse:  [66-75]   Resp:  [16-18]   BP: (103-121)/(56-63)   SpO2:  [94 %-98 %]   BP Location: Left arm    Physical Exam  General: Well developed, well nourished female in NAD  HEENT: Conjunctiva clear; Oropharynx clear  Neck: No JVD noted, Supple  CV: Normal S1, S2 with no murmurs,gallops,rubs  Resp: Lungs CTA Bilaterally, Unlabored  Abdomen: NTND, BS normoactive x4 quads, soft  Extrem: No cyanosis, clubbing, edema.  Skin: No rashes, lesions, ulcers      Neurological Exam:   LOC: alert and follows requests  Language: No aphasia  Speech: No dysarthria  Orientation: Person, Place, Time  Memory: Recent memory intact, Remote memory intact, Age correct, Month  correct  Visual Fields (CN II): Full  EOM (CN III, IV, VI): Full/intact  Pupils (CN III, IV, VI): PERRL  Facial Movement (CN VII): symmetric facial expression  Gag Reflex (CN IX, X): normal/symmetric  Shoulder/Neck (CN XI): SCM-Left: Normal ; SCM-Right: Normal ; Shoulder Shrug: Normal/Symetric  Tongue (CN XII): to midline  Reflexes: flexor plantar responses bilaterally  Motor: Arm Left:  Normal (5/5), Leg Left:   Normal (5/5), Arm Right:   Normal (5/5), Leg Right:   Normal (5/5)  Cerebellar:Mild R arin-ataxia and moderate gait ataxia  Sensation: intact to light touch, temperature and vibration  Tone: Arm-Left: normal; Leg-Left: normal; Arm-Right: normal; Leg-Right: normal    Stroke Scales  Interval: baseline (upon arrival/admit)  Level of Consciousness: 0 - alert  LOC Questions: 0 - answers both correctly  LOC Commands: 0 - performs both correctly  Best Gaze: 0 - normal  Visual: 0 - no visual loss  Facial Palsy: 0 - normal  Motor Left Arm: 0 - no drift  Motor Right Arm: 0 - no drift  Motor Left Le - no drift  Motor Right Le - no drift  Limb Ataxia: 1 - present in one limb  Sensory: 0 - normal  Best Language: 0 - no aphasia  Dysarthria: 0 - normal articulation  Extinction and Inattention: 0 - no neglect  NIH Stroke Scale Total: 1    Laboratory:  CMP:   No results for input(s): GLUCOSE, CALCIUM, ALBUMIN, PROT, NA, K, CO2, CL, BUN, CREATININE, ALKPHOS, ALT, AST, BILITOT in the last 24 hours.  CBC:     Recent Labs  Lab 10/10/17  0449   WBC 7.16   RBC 3.53*   HGB 10.0*   HCT 32.0*      MCV 91   MCH 28.3   MCHC 31.3*     Lipid Panel:     Recent Labs  Lab 10/08/17  0619   CHOL 172   LDLCALC 83.4   HDL 76*   TRIG 63       Diagnostic Results:  I have personally reviewed: MRI Head. Date: 10/08/17  Findings: No enhancement within the right cerebellar signal abnormality compatible with acute infarction.    There is ill-defined subcentimeter nodular enhancement in the right precentral gyrus which is nonspecific  and may represent subacute age area of infarction however in light of history new cortical metastases cannot be excluded. Clinical correlation and consideration for correlation with CSF analysis and short-term followup recommended.    There is continued pachymeningeal enhancement underlying the craniotomy with continued somewhat nodular enhancement within overlying left frontal convexity overall reduced from prior. No evidence for new or increased enhancement in this region to suggest definite local worsening residual recurrent lesion.      Angie Gloria MD  Comprehensive Stroke Center  Department of Vascular Neurology   [facility]

## 2017-10-11 NOTE — SUBJECTIVE & OBJECTIVE
Neurologic Chief Complaint: Vertigo    Subjective:     Interval History:   NAEON. No issues per nursing. Neuroexam unchanged. HDS and BP well controlled and sating well at RA. Tolerating PO intake very well. Working with PT/OT and ambulating.Voiding approprietly. All labs WNL. She got accepted to [facility]. She is ready and medically stable to be discharge to [facility] today.      HPI, Past Medical, Family, and Social History remains the same as documented in the initial encounter.     Review of Systems  She denies any complaint this am      Scheduled Meds:   amitriptyline  50 mg Oral QHS    amlodipine  5 mg Oral Daily    atorvastatin  40 mg Oral Daily    erlotinib  150 mg Oral Daily    levetiracetam  750 mg Oral BID    lipase-protease-amylase 12,000-38,000-60,000 units  1 capsule Oral TID WM    metoprolol succinate  50 mg Oral Daily    rivaroxaban  20 mg Oral Daily with dinner    sodium chloride 0.9%  3 mL Intravenous Q8H     Continuous Infusions:   sodium chloride 0.9%       PRN Meds:influenza, labetalol, lorazepam, ondansetron, promethazine, sodium chloride 0.9%    Objective:     Vital Signs (Most Recent):  Temp: 98.3 °F (36.8 °C) (10/11/17 0430)  Pulse: 68 (10/11/17 0700)  Resp: 16 (10/10/17 2000)  BP: (!) 113/56 (10/11/17 0430)  SpO2: 96 % (10/11/17 0430)  BP Location: Left arm    Vital Signs Range (Last 24H):  Temp:  [97.3 °F (36.3 °C)-99.8 °F (37.7 °C)]   Pulse:  [66-75]   Resp:  [16-18]   BP: (103-121)/(56-63)   SpO2:  [94 %-98 %]   BP Location: Left arm    Physical Exam  General: Well developed, well nourished female in NAD  HEENT: Conjunctiva clear; Oropharynx clear  Neck: No JVD noted, Supple  CV: Normal S1, S2 with no murmurs,gallops,rubs  Resp: Lungs CTA Bilaterally, Unlabored  Abdomen: NTND, BS normoactive x4 quads, soft  Extrem: No cyanosis, clubbing, edema.  Skin: No rashes, lesions, ulcers      Neurological Exam:   LOC: alert and follows requests  Language: No aphasia  Speech: No  dysarthria  Orientation: Person, Place, Time  Memory: Recent memory intact, Remote memory intact, Age correct, Month correct  Visual Fields (CN II): Full  EOM (CN III, IV, VI): Full/intact  Pupils (CN III, IV, VI): PERRL  Facial Movement (CN VII): symmetric facial expression  Gag Reflex (CN IX, X): normal/symmetric  Shoulder/Neck (CN XI): SCM-Left: Normal ; SCM-Right: Normal ; Shoulder Shrug: Normal/Symetric  Tongue (CN XII): to midline  Reflexes: flexor plantar responses bilaterally  Motor: Arm Left:  Normal (5/5), Leg Left:   Normal (5/5), Arm Right:   Normal (5/5), Leg Right:   Normal (5/5)  Cerebellar:Mild R arin-ataxia and moderate gait ataxia  Sensation: intact to light touch, temperature and vibration  Tone: Arm-Left: normal; Leg-Left: normal; Arm-Right: normal; Leg-Right: normal    Stroke Scales  Interval: baseline (upon arrival/admit)  Level of Consciousness: 0 - alert  LOC Questions: 0 - answers both correctly  LOC Commands: 0 - performs both correctly  Best Gaze: 0 - normal  Visual: 0 - no visual loss  Facial Palsy: 0 - normal  Motor Left Arm: 0 - no drift  Motor Right Arm: 0 - no drift  Motor Left Le - no drift  Motor Right Le - no drift  Limb Ataxia: 1 - present in one limb  Sensory: 0 - normal  Best Language: 0 - no aphasia  Dysarthria: 0 - normal articulation  Extinction and Inattention: 0 - no neglect  NIH Stroke Scale Total: 1    Laboratory:  CMP:   No results for input(s): GLUCOSE, CALCIUM, ALBUMIN, PROT, NA, K, CO2, CL, BUN, CREATININE, ALKPHOS, ALT, AST, BILITOT in the last 24 hours.  CBC:     Recent Labs  Lab 10/10/17  0449   WBC 7.16   RBC 3.53*   HGB 10.0*   HCT 32.0*      MCV 91   MCH 28.3   MCHC 31.3*     Lipid Panel:     Recent Labs  Lab 10/08/17  0619   CHOL 172   LDLCALC 83.4   HDL 76*   TRIG 63       Diagnostic Results:  I have personally reviewed: MRI Head. Date: 10/08/17  Findings: No enhancement within the right cerebellar signal abnormality compatible with acute  infarction.    There is ill-defined subcentimeter nodular enhancement in the right precentral gyrus which is nonspecific and may represent subacute age area of infarction however in light of history new cortical metastases cannot be excluded. Clinical correlation and consideration for correlation with CSF analysis and short-term followup recommended.    There is continued pachymeningeal enhancement underlying the craniotomy with continued somewhat nodular enhancement within overlying left frontal convexity overall reduced from prior. No evidence for new or increased enhancement in this region to suggest definite local worsening residual recurrent lesion.

## 2017-10-11 NOTE — PROGRESS NOTES
[facility]  Physical Medicine & Rehab  Progress Note    Patient Name: Naty St  MRN: [id number]  Admission Date: 10/8/2017  Length of Stay: 3 days  Attending Physician: Kevin Luis MD  Primary Care Provider: Olvin Mars MD    Subjective:     Principal Problem:Cerebellar infarct    Hospital Course:   10/9/17: Evaluated by therapy.  Bed mobility SV-SBA.  Sit to stand Yamil and transfers ModA.  Ambulated 10ft Yamil & RW with significant retropulsion and LE ataxia.  UBD Yamil and LBD ModA.  10/10/17: Participating with therapy.  Bed mobility SV-SBA.  Sit to stand Yamil-ModA & RW and transfers.  Ambulated 55 ft Mod-MaxA & rw.  UBD SBA and LBD ModA.      Interval History:  (10/11/2017): Patient is seen for follow-up rehab evaluation and recommendations.  No acute events over night.  Participating  with therapy.  Barriers for discharge/rehab admission: Insurance approval pending for rehab admission.        HPI, Past Medical, Surgical, Family, and Social History remains the same as documented in the initial encounter.      Scheduled Medications:    amitriptyline  50 mg Oral QHS    amlodipine  5 mg Oral Daily    atorvastatin  40 mg Oral Daily    erlotinib  150 mg Oral Daily    levetiracetam  750 mg Oral BID    lipase-protease-amylase 12,000-38,000-60,000 units  1 capsule Oral TID WM    metoprolol succinate  50 mg Oral Daily    rivaroxaban  20 mg Oral Daily with dinner    sodium chloride 0.9%  3 mL Intravenous Q8H       PRN Medications: influenza, labetalol, lorazepam, ondansetron, promethazine, sodium chloride 0.9%    Review of Systems   Constitutional: Negative for chills, fatigue and fever.   HENT: Negative for trouble swallowing and voice change.    Eyes: Negative for photophobia and visual disturbance.   Respiratory: Negative for cough, shortness of breath and wheezing.    Cardiovascular: Negative for chest pain and palpitations.   Gastrointestinal: Negative for  abdominal distention and nausea.   Genitourinary: Negative for difficulty urinating and flank pain.   Musculoskeletal: Positive for gait problem. Negative for arthralgias.   Skin: Negative for color change and rash.   Neurological: Positive for speech difficulty and weakness. Negative for facial asymmetry, numbness and headaches.   Psychiatric/Behavioral: Negative for agitation and confusion.     Objective:     Vital Signs (Most Recent):  Temp: 98.3 °F (36.8 °C) (10/11/17 0430)  Pulse: 68 (10/11/17 0700)  Resp: 16 (10/10/17 2000)  BP: (!) 113/56 (10/11/17 0430)  SpO2: 96 % (10/11/17 0430)    Vital Signs (24h Range):  Temp:  [97.3 °F (36.3 °C)-99.8 °F (37.7 °C)] 98.3 °F (36.8 °C)  Pulse:  [66-75] 68  Resp:  [16-18] 16  SpO2:  [94 %-98 %] 96 %  BP: (103-121)/(56-63) 113/56     Physical Exam   Constitutional: She appears well-developed and well-nourished.   HENT:   Head: Normocephalic and atraumatic.   Eyes: EOM are normal. Pupils are equal, round, and reactive to light.   Neck: Normal range of motion.   Cardiovascular: Normal rate and regular rhythm.    Pulmonary/Chest: Effort normal. No respiratory distress.   Abdominal: Soft. There is no tenderness.   Musculoskeletal: Normal range of motion. She exhibits no deformity.   Neurological:   -  Mental Status:  AAOx3.  Follows commands.  Answers correct age and .  Recent and remote memory intact.  No neglect.    -  Speech and language:  - aphasia + dysarthria.    -  Coordination:  Finger to nose exam:  RUE mild dysmetria, LUE dysmetria.   -  Motor:  RUE: 4/5, 4/5 .  LUE: 5/5, 5/5 .  RLE: 5/5, DF 5/5, PF 5/5.  LLE: 5/5, DF 5/5, PF 5/5.   -  pronator drift. negative  -  Tone:  normal  -  Sensory:  Intact to light touch and pin prick.  Skin: Skin is warm and dry.   Psychiatric: She has a normal mood and affect. Her behavior is normal.   Vitals reviewed.        Diagnostic Results:   Labs: Reviewed  MRI: Reviewed  Assessment/Plan:      * Right Cerebellar infarct     -possible area of nodular enhancement in R precentral gyrus that may represent subacute infarct per Radiology     Functional status: see hospital course  Cognitive/Speech/Language status:  Dysarthria and mild Cognitive-Linguistic Impairment. She present with decreased word fluency and articulatory precision in connected discourse.  Nutrition/Swallow Status:  Passed bedside swallow evaluation.  SLP recommended regular diet and thin liquids.    Recommendations  -  Encourage mobility, OOB in chair at least 3 hours per day, and early ambulation as appropriate   -  PT/OT evaluate and treat  -  SLP speech and cognitive evaluate and treat  -  Monitor sleep disturbances and establish consistent sleep-wake cycle  -  Monitor for bowel and bladder dysfunction  -  Monitor for shoulder pain and subluxation  -  Monitor for spasticity  -  Monitor for and prevent skin breakdown and pressure ulcers  · Early mobility, repositioning/weight shifting every 20-30 minutes when sitting, turn patient every 2 hours, proper mattress/overlay and chair cushioning, pressure relief/heel protector boots  -  DVT prophylaxis:  SCDs  -  Reviewed discharge options (IP rehab, SNF, HH therapy, and OP therapy)          Ataxia    -noted on exam          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    -see R cerebellar infarct         Brain metastases    -h/o pancretic CA with mets s/p crani for resection  -on recent imaging fond to have nodular enhancement involving the R precentral gyrus concerning for possible residual vs recurrence   -NSGY and Oncology consulted         Patient approved for [facility].  Insurance pending admission.  Transfer to rehab when insurance clears and she is medically ready for discharge.      Mary Ann Sanchez NP  Department of Physical Medicine & Rehab   [facility]

## 2017-10-11 NOTE — NURSING
Arrived to Rehab via W/C with [facility]. AAO X4, assisted to bed with 1 person, mod  Assistance. Dr. Ponce notified of arrival.

## 2017-10-11 NOTE — ASSESSMENT & PLAN NOTE
-h/o pancretic CA with mets s/p crani for resection  -on recent imaging fond to have nodular enhancement involving the R precentral gyrus concerning for possible residual vs recurrence   -NSGY and Oncology consulted

## 2017-10-11 NOTE — PLAN OF CARE
Problem: Patient Care Overview  Goal: Plan of Care Review  Outcome: Ongoing (interventions implemented as appropriate)  POC reviewed with patient. Patient c/o numbness and tingling in right arm; however experiences no decline in strength or movement. Vitals signs remained stable. Up to bedside commode with 1 assist. Free from falls and injury. Telemetry monitoring in place. Son at bedside and attentive to patient. Will continue to monitor.

## 2017-10-11 NOTE — DISCHARGE SUMMARY
"[facility]  Vascular Neurology  Comprehensive Stroke Center  Discharge Summary     Summary:     Admit Date: 10/8/2017  5:50 AM    Discharge Date and Time:  10/11/2017 10:52 AM    Attending Physician: Kevin Luis MD     Discharge Provider: Angie Gloria MD    History of Present Illness: 73 y/o female who went to bed around 2300 on 10-7-17. She woke up around 0530 she tried to get up and could not then opened her eyes and she felt "funny" like everything was upside down and then blurry, she also vomited. Per  she also had dysarthria for a few minutes. EMS was called.  Upon arrival states that when she opens her eyes everything is spinning no longer blurry or upside down. Denies and aphasia or dysarthria, extremity weakness or sensory loss. Does have dysmetria on the right arm.   Patient states she has never felt this before and has never had vertigo before.   Risk factors are HTN, cancers  Differential dx are cerebellar stroke vs vertigo    Hospital Course (synopsis of major diagnoses, care, treatment, and services provided during the course of the hospital stay): 10/10: No acute events overnight and no issues per nursing. Stable on exam. All labs WNL. Tolerating PO intake and sating well at RA.Ready and medically stable for discharge to [facility] pending insuarnce approval  10/11: NAEON. No issues per nursing. Neuroexam unchanged. BP well controlled and sating well at RA. Tolerating PO intake very well. Working with PT/OT and ambulating.Voiding approprietly. She got accepted to [facility]. She is ready and medically stable to be discharge to [facility] today.    NIH Stroke Scale:    Level of Consciousness: 0 - alert  LOC Questions: 0 - answers both correctly  LOC Commands: 0 - performs both correctly  Best Gaze: 0 - normal  Visual: 0 - no visual loss  Facial Palsy: 0 - normal  Motor Left Arm: 0 - no drift  Motor Right Arm: 0 - no drift  Motor Left Le - no drift  Motor Right Leg: " 0 - no drift  Limb Ataxia: 1 - present in one limb  Sensory: 0 - normal  Best Language: 0 - no aphasia  Dysarthria: 0 - normal articulation  Extinction and Inattention: 0 - no neglect  NIH Stroke Scale Total: 1  Modified Amelia Scale:   Timeline: At discharge  Modified Rachele Score: 2 - slight disability            Assessment/Plan:     Interventions: None    Complications: None    Research Candidate?:  No    Neurological deficit at discharge: Ataxia     Disposition: Rehab Facility    Final Active Diagnoses:    Diagnosis Date Noted POA    PRINCIPAL PROBLEM:  Right Cerebellar infarct [I63.9] 10/09/2017 Yes    Ataxia [R27.0] 10/10/2017 Yes    Vertigo [R42] 10/08/2017 Yes    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 [I63.441] 10/08/2017 Yes    Cytotoxic cerebral edema [G93.6] 10/08/2017 Yes    Brain metastases [C79.31] 03/17/2016 Yes    HTN (hypertension) [I10] 08/25/2014 Yes     Chronic      Problems Resolved During this Admission:    Diagnosis Date Noted Date Resolved POA    Visual disturbance [H53.9] 10/08/2017 10/11/2017 Yes     * Right Cerebellar infarct    -- See above          Ataxia    2/2 R cerebellar stroke  -- Continue PT/OT and gait training         Cytotoxic cerebral edema    -- Noted on imaging without MLS  -- Continue neurocheck         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    A 72 year old female who p/w posterior circulation sx and found to have R cerebellar stroke on MRI 2/2 R SCA occlusion on MRA. She continue to improve and doing well. PT/OT recs for [facility].       -- LDL 83, TG 63, A1C 4.1 and TSH 2.9  -- Anticoag: On Xarelto  -- Statins for secondary stroke prevention and hyperlipidemia, if present: Atorvastatin- 40 mg oral daily  -- Aggressive risk factor modification: Hypertension  -- Rehab Efforts: Physical Therapy and Occupational Therapy; [facility]  -- She is accepted to [facility] and will discharge today           Vertigo    2/2 posterior circulation  stroke   -- Improving and getting better           Brain metastases    She had of pan ca with mets s/p crani for resection. She fond to have nodular enhancement involving the right precentral gyrus concerning for possible residual vs recurrence   -- NSGY and Oncology updated         HTN (hypertension)    -- Keep SBP <160  -- Continue home medication             Recommendations:     Post-discharge complication risks: Falls    Stroke Education given to: patient and family    Follow-up in Stroke Clinic in 4-6 weeks     Discharge Plan:  Statin: Atorvastatin 40mg  Anticoagulant: Rivaroxaban  Aggresive risk factor modification:  Hypertension    Follow Up:  Follow-up Information     Olvin Mars MD.    Specialty:  Internal Medicine  Why:  As needed, if symptoms not improved  Contact information:  [address]  [place] [id number] [phone]             [facility].    Specialty:  Emergency Medicine  Why:  As needed, If symptoms worsen  Contact information:  [address] [facility] [id number] [phone]           [facility] In 4 weeks.    Specialty:  Neurology  Contact information:  [address] [facility] [address] [phone]  Additional information:  7th Floor               Patient Instructions:   No discharge procedures on file.  Medications:  Reconciled Home Medications:   Current Discharge Medication List      START taking these medications    Details   !! erlotinib (TARCEVA) 150 MG tablet Take 1 tablet (150 mg total) by mouth once daily.  Qty: 60 tablet, Refills: 12      meclizine (ANTIVERT) 12.5 mg tablet Take 1-2 tablets (12.5-25 mg total) by mouth 3 (three) times daily as needed for Dizziness.  Qty: 30 tablet, Refills: 0       atorvastatin 40 mg tablet                       Take 1 tablet by mouth daily at bed time                                                                  Qty: 30 tablet, Refills: 12      CONTINUE  these medications which have NOT CHANGED    Details   amitriptyline (ELAVIL) 50 MG tablet Take 1 tablet (50 mg total) by mouth every evening.  Qty: 30 tablet, Refills: 2    Comments: This prescription was filled today(12/27/2016). Any refills authorized will be placed on file.  Associated Diagnoses: Depression, unspecified depression type      amlodipine (NORVASC) 5 MG tablet Take 1 tablet (5 mg total) by mouth once daily.  Qty: 30 tablet, Refills: 11      calcium citrate 250 mg calcium Tab Take 750 mg by mouth 3 (three) times daily.  Refills: 0      clindamycin phosphate 1% (CLINDAGEL) 1 % gel Apply topically 2 (two) times daily.  Qty: 60 g, Refills: 6    Associated Diagnoses: Acneiform drug eruption      CREON CpDR TAKE ONE CAPSULE BY MOUTH THREE TIMES A DAY WITH MEALS  Qty: 270 capsule, Refills: 3      denosumab (XGEVA) 120 mg/1.7 mL (70 mg/mL) Soln Inject 120 mg into the skin every 28 days.      dronabinol (MARINOL) 5 MG capsule Take 1 capsule (5 mg total) by mouth 2 (two) times daily before meals.  Qty: 60 capsule, Refills: 3    Associated Diagnoses: Malignant neoplasm of lower lobe of right lung; Anorexia      ergocalciferol (ERGOCALCIFEROL) 50,000 unit Cap Take 1 capsule (50,000 Units total) by mouth every 7 days.  Qty: 8 capsule, Refills: 0      levetiracetam (KEPPRA) 750 MG Tab Take 1 tablet (750 mg total) by mouth 2 (two) times daily.  Qty: 60 tablet, Refills: 3    Associated Diagnoses: Secondary adenocarcinoma of brain      lorazepam (ATIVAN) 1 MG tablet TAKE ONE TABLET BY MOUTH TWICE DAILY  Qty: 60 tablet, Refills: 2      metoprolol succinate (TOPROL-XL) 50 MG 24 hr tablet Take 1 tablet (50 mg total) by mouth once daily.  Qty: 30 tablet, Refills: 11      mirabegron 50 mg Tb24 Take 1 tablet (50 mg total) by mouth once daily.  Qty: 30 tablet, Refills: 11    Associated Diagnoses: OAB (overactive bladder)      multivitamin (THERAGRAN) per tablet Take 1 tablet by mouth once daily.      rivaroxaban (XARELTO)  20 mg Tab Take 1 tablet (20 mg total) by mouth daily with dinner or evening meal.  Qty: 30 tablet, Refills: 11    Associated Diagnoses: Acute deep vein thrombosis (DVT) of both lower extremities, unspecified vein      sodium bicarbonate 650 MG tablet Take 1 tablet (650 mg total) by mouth 3 (three) times daily. Increase dose to 2 60 mg tabs by mouth three times daily.  Qty: 270 tablet, Refills: 11      !! TARCEVA 150 mg tablet TAKE 1 TABLET (150MG) BY MOUTH ONCE DAILY  Qty: 30 tablet, Refills: 6    Associated Diagnoses: Malignant neoplasm of lower lobe of right lung       !! - Potential duplicate medications found. Please discuss with provider.          Angie Gloria MD  Comprehensive Stroke Center  Department of Vascular Neurology   [facility]

## 2017-10-11 NOTE — PT/OT/SLP DISCHARGE
Occupational Therapy Discharge Summary    Naty St  MRN: [id number]   Cerebellar infarct   Patient Discharged from acute Occupational Therapy on 10/11.  Please refer to prior OT note dated on 10/10 for functional status.     Assessment:   Patient appropriate for care in another setting.  GOALS:    Occupational Therapy Goals        Problem: Occupational Therapy Goal    Goal Priority Disciplines Outcome Interventions   Occupational Therapy Goal     OT, PT/OT     Description:  Goals set 10/9 to be addressed for 7 days with expiration date, 10/16:  Patient will increase functional independence with ADLs by performing:  Patient will demonstrate supine -sit with modified independence.   Not met  Patient will demonstrate stand pivot transfers with CGA.   Not met  Patient will demonstrate grooming while standing with CGA.   Not met  Patient will demonstrate upper body dressing with SBA while seated EOB.   Not met  Patient will demonstrate lower body dressing with min assist while seated EOB.   Not met  Patient will demonstrate toileting with CGA.   Not met  Patient will demonstrate bathing while seated EOB with CGA.   Not met  Patient's family / caregiver will demonstrate independence and safety with assisting patient with self-care skills and functional mobility.     Not met  Patient and/or patient's family will verbalize understanding of stroke prevention guidelines, personal risk factors and stroke warning signs via teachback method.  Not met                         Reasons for Discontinuation of Therapy Services  Transfer to alternate level of care.      Plan:  Patient Discharged to: Inpatient Rehab.  LEOBARDO Iqbal  10/11/2017

## 2017-10-11 NOTE — SUBJECTIVE & OBJECTIVE
NIH Stroke Scale:    Level of Consciousness: 0 - alert  LOC Questions: 0 - answers both correctly  LOC Commands: 0 - performs both correctly  Best Gaze: 0 - normal  Visual: 0 - no visual loss  Facial Palsy: 0 - normal  Motor Left Arm: 0 - no drift  Motor Right Arm: 0 - no drift  Motor Left Le - no drift  Motor Right Le - no drift  Limb Ataxia: 1 - present in one limb  Sensory: 0 - normal  Best Language: 0 - no aphasia  Dysarthria: 0 - normal articulation  Extinction and Inattention: 0 - no neglect  NIH Stroke Scale Total: 1  Modified Butte Scale:   Timeline: At discharge  Modified Butte Score: 2 - slight disability

## 2017-10-11 NOTE — PLAN OF CARE
Problem: SLP Goal  Goal: SLP Goal  Speech Language Pathology Goals  Goals expected to be met by 10/16  1. Pt will tolerate regular diet with thin liquids without overt s/s aspiration.  2. Pt will recall 3/3 simple speech strategies with min cues.  3. Pt will implement speech strategies at sentence level with min cues and 80% intelligibility.  4. Pt will complete diadochokinetic tasks with min cues and fair ability.  5. Pt will name 10 items per concrete cat with min cues to improve word fluency.  6. Pt will utilize simple memory strategies with min cues to complete immediate recall tasks with 80% accy.  7. Pt will complete assessment of reading, writing, visual spatial skills to determine need for tx.          Pt with continued progress towards goals.    Christiana Regan M.A. CCC-SLP  Speech Language Pathologist  [phone]  10/11/2017

## 2017-10-11 NOTE — PLAN OF CARE
Problem: Physical Therapy Goal  Goal: Physical Therapy Goal  Goals to be met by: 10/20/2017     Patient will increase functional independence with mobility by performin. Sit to stand transfer with SBA using RW (CGA using RW met 10/11)  2. Bed to chair transfer with SBA using Rolling Walker (CGA using RW 10/11)  3. Gait x100 feet with Minimal Assistance using Rolling Walker   4. Ascend/descend 8 stairs with bilateral Handrails using min assist to access home environment safely.   5. Stand for 10 minutes with Contact Guard Assistance and no posterior LOB using Rolling Walker while performing dynamic UE tasks     Outcome: Ongoing (interventions implemented as appropriate)    Goals updated and appropriate to reflect pt's current mobility needs.    Emilia Villegas, PT, DPT  [phone]  10/11/2017

## 2017-10-11 NOTE — PT/OT/SLP PROGRESS
Speech Language Pathology  Treatment    Naty St   MRN: [id number]   Admitting Diagnosis: Cerebellar infarct    Diet recommendations: Solid Diet Level: Regular  Liquid Diet Level: Thin   Feed only when awake/alert, HOB to 90 degrees, Small bites/sips, 1 bite/sip at a time, Meds whole 1 at a time and Avoid talking while eating  Eat with dentures in place    SLP Treatment Date: 10/11/17  Speech Start Time: 1012     Speech Stop Time: 1028     Speech Total (min): 16 min       TREATMENT BILLABLE MINUTES:  Speech Therapy Individual 8 and Treatment Swallowing Dysfunction 8    Has the patient been evaluated by SLP for swallowing? : Yes  Keep patient NPO?: No   General Precautions: Standard, fall, aspiration          Subjective:  Pt awake; pleasant    Pain/Comfort  Pain Rating 1: 0/10  Pain Rating Post-Intervention 1: 0/10    Objective:     pt denies difficulty with current diet. Pt tolerated 3 oz thin liquid via straw and 1 bite myra cracker without s/s of airway compromise. Oral phase cb mild slowness to mastication; pt states baseline 2/2 ill-fitting dentures. Skilled education provided on aspiration precautions and diet recommendations. Whiteboard updated with diet recommendations/aspiration precautions.     Pt recalled 3 intelligibility strategies with 0% acc indep, improving to 100% acc provide binary choice. Pt with fair intelligibility during conversation that benefited from repetition of message; benefited from verbal cues to implement intelligibility strategies. Pt completed simple, decontextualized divergent categorization task with 90% acc indep, improving to 100% acc provided verbal cues. Pt completed same task listing 7 items with 71% acc indep, improving to 86% acc provided verbal cues.  At end of session, pt recalled same  3 intelligibility strategies with 100% acc indep. Pt reported progressive cognitive decline pta that she was seeking intervention for.  No further questions.                                        Assessment:  Naty St is a 72 y.o. female with a medical diagnosis of Cerebellar infarct and presents with cognitive impairment, dysarthria, mild oral dysphagia. continued progress towards goals.      Discharge recommendations: Discharge Facility/Level Of Care Needs: rehabilitation facility     Goals:    SLP Goals        Problem: SLP Goal    Goal Priority Disciplines Outcome   SLP Goal     SLP Ongoing (interventions implemented as appropriate)   Description:  Speech Language Pathology Goals  Goals expected to be met by 10/16  1. Pt will tolerate regular diet with thin liquids without overt s/s aspiration.  2. Pt will recall 3/3 simple speech strategies with min cues.  3. Pt will implement speech strategies at sentence level with min cues and 80% intelligibility.  4. Pt will complete diadochokinetic tasks with min cues and fair ability.  5. Pt will name 10 items per concrete cat with min cues to improve word fluency.  6. Pt will utilize simple memory strategies with min cues to complete immediate recall tasks with 80% accy.  7. Pt will complete assessment of reading, writing, visual spatial skills to determine need for tx.                          Plan:   Patient to be seen Therapy Frequency: 5 x/week   Plan of Care expires: 11/08/17  Plan of Care reviewed with: patient, spouse, family  SLP Follow-up?: Yes  SLP - Next Visit Date: 10/12/17          Christiana Regan M.A. CCC-SLP  Speech Language Pathologist  [phone]  10/11/2017

## 2017-10-11 NOTE — SUBJECTIVE & OBJECTIVE
Interval History:  (10/11/2017): Patient is seen for follow-up rehab evaluation and recommendations.  No acute events over night.  Participating  with therapy.  Barriers for discharge/rehab admission: Insurance approval pending for rehab admission.        HPI, Past Medical, Surgical, Family, and Social History remains the same as documented in the initial encounter.      Scheduled Medications:    amitriptyline  50 mg Oral QHS    amlodipine  5 mg Oral Daily    atorvastatin  40 mg Oral Daily    erlotinib  150 mg Oral Daily    levetiracetam  750 mg Oral BID    lipase-protease-amylase 12,000-38,000-60,000 units  1 capsule Oral TID WM    metoprolol succinate  50 mg Oral Daily    rivaroxaban  20 mg Oral Daily with dinner    sodium chloride 0.9%  3 mL Intravenous Q8H       PRN Medications: influenza, labetalol, lorazepam, ondansetron, promethazine, sodium chloride 0.9%    Review of Systems   Constitutional: Negative for chills, fatigue and fever.   HENT: Negative for trouble swallowing and voice change.    Eyes: Negative for photophobia and visual disturbance.   Respiratory: Negative for cough, shortness of breath and wheezing.    Cardiovascular: Negative for chest pain and palpitations.   Gastrointestinal: Negative for abdominal distention and nausea.   Genitourinary: Negative for difficulty urinating and flank pain.   Musculoskeletal: Positive for gait problem. Negative for arthralgias.   Skin: Negative for color change and rash.   Neurological: Positive for speech difficulty and weakness. Negative for facial asymmetry, numbness and headaches.   Psychiatric/Behavioral: Negative for agitation and confusion.     Objective:     Vital Signs (Most Recent):  Temp: 98.3 °F (36.8 °C) (10/11/17 0430)  Pulse: 68 (10/11/17 0700)  Resp: 16 (10/10/17 2000)  BP: (!) 113/56 (10/11/17 0430)  SpO2: 96 % (10/11/17 0430)    Vital Signs (24h Range):  Temp:  [97.3 °F (36.3 °C)-99.8 °F (37.7 °C)] 98.3 °F (36.8 °C)  Pulse:  [66-75]  68  Resp:  [16-18] 16  SpO2:  [94 %-98 %] 96 %  BP: (103-121)/(56-63) 113/56     Physical Exam   Constitutional: She appears well-developed and well-nourished.   HENT:   Head: Normocephalic and atraumatic.   Eyes: EOM are normal. Pupils are equal, round, and reactive to light.   Neck: Normal range of motion.   Cardiovascular: Normal rate and regular rhythm.    Pulmonary/Chest: Effort normal. No respiratory distress.   Abdominal: Soft. There is no tenderness.   Musculoskeletal: Normal range of motion. She exhibits no deformity.   Neurological:   -  Mental Status:  AAOx3.  Follows commands.  Answers correct age and .  Recent and remote memory intact.  No neglect.    -  Speech and language:  - aphasia + dysarthria.    -  Coordination:  Finger to nose exam:  RUE mild dysmetria, LUE dysmetria.   -  Motor:  RUE: 4/5, 4/5 .  LUE: 5/5, 5/5 .  RLE: 5/5, DF 5/5, PF 5/5.  LLE: 5/5, DF 5/5, PF 5/5.   -  pronator drift. negative  -  Tone:  normal  -  Sensory:  Intact to light touch and pin prick.       Skin: Skin is warm and dry.   Psychiatric: She has a normal mood and affect. Her behavior is normal.   Vitals reviewed.    NEUROLOGICAL EXAMINATION:     CRANIAL NERVES     CN III, IV, VI   Pupils are equal, round, and reactive to light.  Extraocular motions are normal.

## 2017-10-11 NOTE — PLAN OF CARE
10:51 AM  JESSENIA informed Aracelis with [facility] that orders are in. SW will arrange transport with [facility]. Report can be called to [phone]. JESSENIA will inform [facility] of this information.     11:59 AM  SW arranged transport with [facility] for noon. JESSENIA informed that [facility] can get here about 12:45 to transport patient.     Mana Ortiz LMSW  [facility]  Ext. [phone]

## 2017-10-12 LAB
ANION GAP SERPL CALC-SCNC: 3 MMOL/L
ANION GAP SERPL CALC-SCNC: 3 MMOL/L
BASOPHILS # BLD AUTO: 0.02 K/UL
BASOPHILS # BLD AUTO: 0.02 K/UL
BASOPHILS NFR BLD: 0.3 %
BASOPHILS NFR BLD: 0.3 %
BUN SERPL-MCNC: 15 MG/DL
BUN SERPL-MCNC: 15 MG/DL
CALCIUM SERPL-MCNC: 8 MG/DL
CALCIUM SERPL-MCNC: 8 MG/DL
CHLORIDE SERPL-SCNC: 115 MMOL/L
CHLORIDE SERPL-SCNC: 115 MMOL/L
CO2 SERPL-SCNC: 24 MMOL/L
CO2 SERPL-SCNC: 24 MMOL/L
CREAT SERPL-MCNC: 1.4 MG/DL
CREAT SERPL-MCNC: 1.4 MG/DL
DIFFERENTIAL METHOD: ABNORMAL
DIFFERENTIAL METHOD: ABNORMAL
EOSINOPHIL # BLD AUTO: 0.2 K/UL
EOSINOPHIL # BLD AUTO: 0.2 K/UL
EOSINOPHIL NFR BLD: 3.6 %
EOSINOPHIL NFR BLD: 3.6 %
ERYTHROCYTE [DISTWIDTH] IN BLOOD BY AUTOMATED COUNT: 13.8 %
ERYTHROCYTE [DISTWIDTH] IN BLOOD BY AUTOMATED COUNT: 13.8 %
EST. GFR  (AFRICAN AMERICAN): 43 ML/MIN/1.73 M^2
EST. GFR  (AFRICAN AMERICAN): 43 ML/MIN/1.73 M^2
EST. GFR  (NON AFRICAN AMERICAN): 37.3 ML/MIN/1.73 M^2
EST. GFR  (NON AFRICAN AMERICAN): 37.3 ML/MIN/1.73 M^2
GLUCOSE SERPL-MCNC: 93 MG/DL
GLUCOSE SERPL-MCNC: 93 MG/DL
HCT VFR BLD AUTO: 33.7 %
HCT VFR BLD AUTO: 33.7 %
HGB BLD-MCNC: 10.3 G/DL
HGB BLD-MCNC: 10.3 G/DL
LYMPHOCYTES # BLD AUTO: 1.5 K/UL
LYMPHOCYTES # BLD AUTO: 1.5 K/UL
LYMPHOCYTES NFR BLD: 24.2 %
LYMPHOCYTES NFR BLD: 24.2 %
MAGNESIUM SERPL-MCNC: 1.3 MG/DL
MCH RBC QN AUTO: 28.5 PG
MCH RBC QN AUTO: 28.5 PG
MCHC RBC AUTO-ENTMCNC: 30.6 G/DL
MCHC RBC AUTO-ENTMCNC: 30.6 G/DL
MCV RBC AUTO: 93 FL
MCV RBC AUTO: 93 FL
MONOCYTES # BLD AUTO: 0.7 K/UL
MONOCYTES # BLD AUTO: 0.7 K/UL
MONOCYTES NFR BLD: 11.4 %
MONOCYTES NFR BLD: 11.4 %
NEUTROPHILS # BLD AUTO: 3.8 K/UL
NEUTROPHILS # BLD AUTO: 3.8 K/UL
NEUTROPHILS NFR BLD: 60.5 %
NEUTROPHILS NFR BLD: 60.5 %
PHOSPHATE SERPL-MCNC: 2.3 MG/DL
PLATELET # BLD AUTO: 289 K/UL
PLATELET # BLD AUTO: 289 K/UL
PMV BLD AUTO: 10.4 FL
PMV BLD AUTO: 10.4 FL
POTASSIUM SERPL-SCNC: 3.7 MMOL/L
POTASSIUM SERPL-SCNC: 3.7 MMOL/L
RBC # BLD AUTO: 3.62 M/UL
RBC # BLD AUTO: 3.62 M/UL
SODIUM SERPL-SCNC: 142 MMOL/L
SODIUM SERPL-SCNC: 142 MMOL/L
WBC # BLD AUTO: 6.31 K/UL
WBC # BLD AUTO: 6.31 K/UL

## 2017-10-12 PROCEDURE — 97535 SELF CARE MNGMENT TRAINING: CPT

## 2017-10-12 PROCEDURE — 36415 COLL VENOUS BLD VENIPUNCTURE: CPT

## 2017-10-12 PROCEDURE — 92610 EVALUATE SWALLOWING FUNCTION: CPT

## 2017-10-12 PROCEDURE — 97530 THERAPEUTIC ACTIVITIES: CPT

## 2017-10-12 PROCEDURE — 84100 ASSAY OF PHOSPHORUS: CPT

## 2017-10-12 PROCEDURE — 97112 NEUROMUSCULAR REEDUCATION: CPT

## 2017-10-12 PROCEDURE — 83735 ASSAY OF MAGNESIUM: CPT

## 2017-10-12 PROCEDURE — 25000003 PHARM REV CODE 250: Performed by: PHYSICAL MEDICINE & REHABILITATION

## 2017-10-12 PROCEDURE — 97167 OT EVAL HIGH COMPLEX 60 MIN: CPT

## 2017-10-12 PROCEDURE — 97163 PT EVAL HIGH COMPLEX 45 MIN: CPT

## 2017-10-12 PROCEDURE — 99222 1ST HOSP IP/OBS MODERATE 55: CPT | Mod: ,,, | Performed by: PHYSICAL MEDICINE & REHABILITATION

## 2017-10-12 PROCEDURE — 25000003 PHARM REV CODE 250: Performed by: STUDENT IN AN ORGANIZED HEALTH CARE EDUCATION/TRAINING PROGRAM

## 2017-10-12 PROCEDURE — 12800000 HC REHAB SEMI-PRIVATE ROOM

## 2017-10-12 PROCEDURE — 92523 SPEECH SOUND LANG COMPREHEN: CPT

## 2017-10-12 PROCEDURE — 85025 COMPLETE CBC W/AUTO DIFF WBC: CPT

## 2017-10-12 PROCEDURE — 80048 BASIC METABOLIC PNL TOTAL CA: CPT

## 2017-10-12 RX ORDER — LANOLIN ALCOHOL/MO/W.PET/CERES
400 CREAM (GRAM) TOPICAL 2 TIMES DAILY
Status: DISCONTINUED | OUTPATIENT
Start: 2017-10-12 | End: 2017-10-26 | Stop reason: HOSPADM

## 2017-10-12 RX ADMIN — Medication 400 MG: at 10:10

## 2017-10-12 RX ADMIN — RIVAROXABAN 20 MG: 20 TABLET, FILM COATED ORAL at 05:10

## 2017-10-12 RX ADMIN — AMLODIPINE BESYLATE 5 MG: 5 TABLET ORAL at 07:10

## 2017-10-12 RX ADMIN — LEVETIRACETAM 750 MG: 750 TABLET ORAL at 08:10

## 2017-10-12 RX ADMIN — Medication 400 MG: at 08:10

## 2017-10-12 RX ADMIN — AMITRIPTYLINE HYDROCHLORIDE 50 MG: 25 TABLET, FILM COATED ORAL at 08:10

## 2017-10-12 RX ADMIN — PANCRELIPASE 1 CAPSULE: 60000; 12000; 38000 CAPSULE, DELAYED RELEASE PELLETS ORAL at 07:10

## 2017-10-12 RX ADMIN — LEVETIRACETAM 750 MG: 750 TABLET ORAL at 07:10

## 2017-10-12 RX ADMIN — PANCRELIPASE 1 CAPSULE: 60000; 12000; 38000 CAPSULE, DELAYED RELEASE PELLETS ORAL at 12:10

## 2017-10-12 RX ADMIN — PANCRELIPASE 1 CAPSULE: 60000; 12000; 38000 CAPSULE, DELAYED RELEASE PELLETS ORAL at 05:10

## 2017-10-12 RX ADMIN — METOPROLOL SUCCINATE 50 MG: 50 TABLET, EXTENDED RELEASE ORAL at 07:10

## 2017-10-12 RX ADMIN — ATORVASTATIN CALCIUM 40 MG: 20 TABLET, FILM COATED ORAL at 07:10

## 2017-10-12 NOTE — PROGRESS NOTES
Recreational Therapy   Evaluation    Naty St  MRN: [id number]         10/12/17 0920   Rec Therapy Time Calculation   Rec Start Time 0920   Rec Stop Time 1005   Rec Total Time (min) 45 min   General   Admit Date 10/11/17   Primary Diagnosis R CVA     Zoroastrian Tenriism   Number of Children 4   Occupation Teacher   Does the patient have dentures? Yes   If you were to take part in activities, which of the following would you prefer? Both   Do you feel like you have enough to keep you busy now? (unknown new admit)   Activity Capabilities Moderate   Subjective   Patient states I need to fix this w/c   Precautions   General Precautions aspiration;fall   Visual/Auditory Vision impaired   Assessment   Mobility manual wheelchair   Musculoskeletal impaired fine motor coordination;impaired gross motor coordination;impaired balance;impaired strength;impaired endurance   Hearing normal   Speech/Communication (Pt able to communicate basic, pt did state that her speech c)   Cognitive Concerns oriented x4   Emotional Concerns appears depressed;appears anxious  (Pt currently on med for depression/anxiety)   Leisure Interest Survey   Leisure Interest Survey Yes   Social/Group Activities   Rastafari/Quaker Current Interest   Shopping Current Interest   Solitary Activities   Watching TV Current Interest   Music Listening Current Interest   Therapeutic Recreation   Stress Management/Relaxation Training Able to identify stress of pain levels   Social Skills Interacts independently in social activity   Leisure Education Demonstrates knowledge of leisure barriers   Leisure Resources Lacks awareness of leisure resources   Leisure Attitude/Participation With coaxing, participates in group activity and identifies benefits of involvement   Rec Therapy Group Assistance Moderate Assistance   Problem Solving Activity Assistance Moderate Assistance   Assessment   Assessment Pt seen to complete Rt assessment.  Pt's voice volume noted to  be low.  Able to express basic needs, but some word finding difficulties noted.  Pt did state that she is experiencing some memory deficits at this time.  Social affect pleasant, pt currently on meds for depression/anxiety which she states is currently working.  Pt's leisure interests limited 2* illness, pt also had limited information about current community resources available to her within her area.  No pain complaints voiced, no significant barriers noted for active leisure/social participation at this time.   Goals   Additional Documentation yes   Goal Formulation With patient   Time For Goal Achievement (12-14 days)   Goal 1 1.  Pt will learn and be educated on available community resources x1 week.    2.  Therapist will introduce new leisure activities to enhance and stimulate new interests, to decrease boredom, improve social and physical skills x2 weeks.    3.  Pt will be able to voiced (2) activities/leisure skills learned in RT   Goal 1-3-Progress [id number]   Plan   Planned Therapy Intervention Plan of care initiated   Expected Length of Stay (12-14 days)   PT Frequency Minimum of 1 visit per week   Time   Treatment time 3 units     Rehab orientation and unit safety rules reviewed with pt, pt was able to voice back unit safety rules.      Stacey Pandey, CTRS, 10/12/2017

## 2017-10-12 NOTE — PROGRESS NOTES
NURSE TECH FIM  REPORT    EATING  [] Pt. opens packages, cuts food, pours liquids, and feeds self.  Regular consistency (7)  [] Pt. needs dentures, swallow technique, special foods or drink consistency (6)  [] Pt. needs supervision (cueing), set up (open containers, cut food, etc. (5)  [] Pt. needs assist with occasional scooping or checking for food pocketing (75-99%) (4)  [] Pt. needs assist to load each bite on utensils, pt.brings food to mouth (50-74%) (3)  [] Pt. needs assist to scoop, bring food to mouth (hand over hand) (25-49%) (2)  [] Pt. Is receiving IV fluids for hydration or tube feedings  (0-24%) (1)      GROOMING   [] Pt. performs all tasks independently and safely (7)  [] Pt. obtains all articles.  Needs adaptive/assistive device (such as wash mitt) (6)  [] Pt. needs supervision (cueing), set up (helper obtains articles, applies toothpaste, plugs razor, hands items to patient, opens containers, adjusts water temperature) (5)       [] Pt. doing 3 out of 4,  or 4 out of 5 tasks.  Needs assist to put in or remove dentures.  Needs steadying assist.(Pt. Doing 75-99%. (4)                                                                 [] Pt. doing 2 out of 4, or 3 out of 5 tasks (50-74%) (3)  [] Pt. doing 1 out of 4, or 2 out of 5 tasks (25-49%) (2)  [] Pt. doing 0 out of 4, or 1 out of 5 tasks or needs assistance of 2 helpers (0-24%) (1)  [] Activity done with OT      BATHING  [] Pt. safely bathes whole body (10 parts of 10) (7)  [] Pt. doing 10 out of 10 body parts.  Uses adaptive devices (such as wash mitt, long handled sponge, etc.) (6)  []  Pt. doing 8-9 out of 10 body parts , or pt. needs steadying assistance. (75-99%) (4)  []  Pt. doing 10 out of 10 body parts buts needs supervision or set up (5)  [] Pt. doing 5-7 out of 10 body parts (50-74%) (3)  [] Pt. doing 3-4 of out 10 body parts (25-49%) (2)  [] Pt. doing 0-2 out of 10 body parts or needs assist of two helpers. (0-24%) (1)    [x] Activity done  with OT      DRESSING UPPER BODY  [] Pt. dresses independently and undresses independently, obtaining clothes from          storage area (7)  [] Pt. needs adaptive devices (such as Velcro fastener, reacher, button hook, etc.)          Applies abdominal binder or any orthotic.  Uses  walker for steadying. (6)  [] Pt. needs supervision (cueing), set up (helper brings clothes or applies orthotics (such as abdominal binder, TLSO, cervical collar) (5)  [] Pt. doing 75-99%. (4)  [] Pt. doing 50-74%. (3)  [] Pt. doing 25-49%. (2)  [] Pt. doing 0-24%, or needs assistance of 2 helpers. (1)  [x] Activity done with OT.      DRESSING LOWER BODY  [] Pt. independent with all parts of dressing lower body. (7)  [] Pt. needs adaptive devices ( such as reacher, long handled shoe horn, sock aid) (6)  [] Pt. needs supervision (cueing), set up(helper brings clothes or applies orthotic, such   as TALON hose, AFO) (5)  [] Pt. doing 75-99%.  Needs steadying assistance: buttoning pants, zipping a zipper,        fastening a belt, tying shoelaces, applying one sock/shoe. (4)  [] Pt. doing 50-74%. (3)  [] Pt. doing 25-49%. (2)  [] Pt. Doing 0-24%, or needs assistance of 2 helpers. (1)  [x] Activity done with OT.      TOILETING  [] Pt. doing 3 out of 3 tasks independently (pulling down clothes, cleansing elizabeth area,  pulling up clothes) (7)  [] Pt. doing 3 out of 3 tasks, but needs assistive device (grab bars, etc.) (6)  [] Pt. needs supervision (cueing), or set up (obtaining supplies for elizabeth care.) (5)  [x] Pt. doing 3 out of 3 tasks, but needs steadying assistance with one or more parts. (75-90%) (4)  [] Pt. doing 2 out of 3 tasks (50-74%) (3)  [] Pt. doing out of 3 tasks (25-49%) (2)  [] Pt. needs assist (more than steadying) with all 3 tasks.  Needs assist of 2 helpers.    Incontinent of B/B today. (0-24%) (1)  [] Activity did not occur.      BLADDER  [] Pt.  uses toilet (7)  [] Pt. is on dialysis - does not urinate (7)  [x] Pt. uses bedside  commode (5)  [] Pt. uses bedpan - staff does ____% of tasks(includes rolling on/off, holding bedpan in place, hygiene, and emptying pan)   Pt. uses urinal - staff empties (5)                     []  Pt. needs help with positioning the urinal (4)                     []  Pt. needs help holding the urinal (1)  [] Pt. has lazo catheter/suprapubic catheter/concom cath - staff empties (1)  [] Pt. is on ICP:                     []  Pt. does catheterization (6)                     []  Staff does catheterization (1)  [] Pt. is incontinent - staff does ____% of tasks (includes clothes management,  hygiene, and changing diaper)  [] Number of accidents during this shift ____       BOWEL  [] Pt. uses toilet (7)  [] Pt. uses bedside commode (5)  [] Pt. uses bedpan - staff does ____% of tasks (includes rolling on/off, holding bedpan                                                          In place, hygiene, and emptying pan)  [] Pt. on bowel program:                    [] Pt.inserts suppository (6)                    [] Nurse inserts suppository (4)                    []  Nurse does dig. stim (1)  [] Pt. has colostomy - staff maintains care and empties (1)                    [] Pt. does ____% of care for colostomy  [] Pt. is incontinent - staff does ____% of tasks (includes clothes management,  hygiene, and changing diaper)  [] Number of accidents during this shift____  [x] No bowel movement this shift      TRANSFERS:  BED/CHAIR/WHEELCHAIR  [] Pt. transfers without assistive device into and out of wheelchair/bed/chair (7)  [] Pt. uses assistive/adaptive devices (grab bars, sliding board, walker, bed rails,  wheelchair armrests, etc.) (6)  [] Pt. needs supervision, cues, or head of bed raised by staff (5)  [] Pt. needs steadying assist with any or all parts of transfer, or needs assist with one  leg during transfer (4)  [x] Pt. needs lifting or lowering, or needs assist with 2 legs during transfer (3)  [] Pt. needs lifting and  lowering during transfer (2)  [] Pt. needs assist of 2 helpers or mechanical lift (1)  [] Activity did not occur   ________________________________________________________________  Includes lying to seated position at edge of bed.  Check assist level needed:  [] Used bedrails              []  Supervision                   []   Min. A  [] Mod A                          []  Max A                            []  Total A    If in wheelchair:  Check assist level needed:  [] Lock brakes                 []  Lifts/lowers footrests       []  Removes w/c arm                                                                                (for slide board transfers)    TRANSFER:  TOILET/BEDSIDE COMMODE   [] Pt. transfers from wheelchair or walking without assistive device to toilet.  Gets on and off a standard toilet. (7)  [] Pt. uses assistive/adaptive device (commode, grab bars, sliding board, walker prosthesis, orthotic, raised toilet seat) (6)  [] Pt. needs supervision, cues, or set up (needs assist to lock brakes, remove leg or arm rest, position sliding board) (5)  [] Pt. needs steadying assist with any or all parts of transfer or needs assist with one leg during transfer. (4)  [] Pt. needs lifting or lowering or needs assist with two legs during transfer. (3)  [] Pt. needs lifting and lowering during transfer. (2)  [] Pt. needs assist of 2 helpers or mechanical lift. (1)  [] Activity did not occur.      TRANSFER:  SHOWER  [] Pt. transfers without assistive device into and out of shower. (7)  [] Pt. uses assistive/adaptive device (shower chair/bench, grab bars, sliding board,   walker, prothesis, orthotic, raised toilet seat. (6)  [] Pt. needs supervision, cues, or set up (needs assist to lock brakes, remove leg or armrest, position sliding board) (5)  [] Pt. needs steadying assist with any or all parts of transfer or needs assist with one      leg during transfer. (4)  [x] Pt. needs lifting or lowering or needs assist with  two legs during transfer. (3)  [] Pt. needs lifting and lowering during transfer. (2)  [] Pt. needs assist of 2 helpers.  Dema pushes shower chair on wheels in and out of   shower. (1)  [] Activity did not occur.                                                                                                                                                                                 r                                                                                                                    pT.

## 2017-10-12 NOTE — PROGRESS NOTES
Occupational Therapy   Evaluation + Treatment    [address]   MRN: [id number]    10/12/17 1030   OT Time Calculation   OT Start Time 1030   OT Stop Time 1200   OT Total Time (min) 90 min   General   OT Date of Treatment 10/12/17   Chart Reviewed Yes   Onset of Illness/Injury or Date of Surgery 10/08/17   Diagnosis R cerebellar infarct   Additional Pertinent History Past Medical History:   Diagnosis Date    Anticoagulant long-term use     Blood clot in vein 06/2016    Breast cancer 1994    Cataract     Hypertension     Pancreatic cancer 1998    Posterior capsular opacification, left eye     Psychiatric problem     Seizures 01/25/2016    Stroke     Therapy      Past Surgical History:   Procedure Laterality Date    BONE BIOSPY  3/9/16    BRAIN SURGERY  1/12/16    tumor removal 1/12/16    BREAST LUMPECTOMY  1998    left    CATARACT EXTRACTION Bilateral 2004    yag OU    CHOLECYSTECTOMY  1998    COLONOSCOPY N/A 11/13/2015    Procedure: COLONOSCOPY;  Surgeon: Alex Carmona MD;  Location: [facility] ([address] [place] [address]);  Service: Endoscopy;  Laterality: N/A;  2 year f/u    cysto and right ureteral stent  4/27/12    ecoli  2010    removal of blockage, done throat, then through the liver.    HYSTERECTOMY  1974    KIDNEY STONE SURGERY  2010--laser    left eswl  6/20/12    OOPHORECTOMY  4/25/2012    Laparoscopic BSO, lysis of adhesions, cystoscopy greater than 35mins     WHIPPLE PROCEDURE W/ LAPAROSCOPY  1998      Date Referred to OT 10/11/17   Referring Physician Dr. Ponce   Family/Caregiver Present Yes  ()   Patient Found (position) Seated in wheelchair   Precautions   General Precautions aspiration;fall   Visual/Auditory Vision impaired   Living Environment   Lives With spouse   Living Arrangements house   Home Accessibility stairs to enter home   Home Layout Able to live on 1st floor   Number of Stairs to Enter Home 8   Stair Railings at Home outside, present at both sides  (unable to reach  "B at the same time)   Living Environment Comment Pt lives in [place], [place] with her  who is retired and will be her primary caregiver. Pt was independent prior to admit with self care.    Equipment Currently Used at Home cane, straight;bedside commode  (TTB but she doesn't know where it is)   Subjective   Patient states "My arm is shaky"   Pain/Comfort   Pain Rating no pain   Cognitive Status   Level of Consciousness (AVPU) alert   Arousal Level opens eyes spontaneously   Orientation oriented x 4   Able to Follow Commands (Communication) mild impairment   Speech clear/fluent;follows commands   Mood/Behavior calm;cooperative   Sensory Exam   Sesnsory Comments pt reports tingling in L hand (ulnar distribution)   Light Touch   RUE w/i normal limits   Range of Motion (ROM)   Range of Motion Examination no ROM deficits were identified   Manual Muscle Testing (MMT)   Manual Muscle Testing Results other (see comments)   Additional Documentation (B shoulder 4/5, B elbow flexion 4/5, B elbow extension 3/5, B  intact and WNL   Fine Motor Coordination Examination   Additional Documentation Fine Motor Coordination   Fine Motor Coordination   Left Hand, Finger to Nose normal performance   Right Hand, Finger to Nose moderate impairment   Left Hand, Graphomotor Skills normal performance   Right Hand, Graphomotor Skills moderate impairment   Tone   Right UE  normal   Left UE normal   Observation   Appearance pleasant female seated upright in w/c   Posture Rounded shoulders  (forward head)   Skin warm;dry;intact   Transfers   Transfer yes   Sit to Stand   Sit <> Stand Assistance Minimum Assistance   Sit <> Stand Assistive Device No Assistive Device   Trials/Comments from EOB   Stand to Sit   Assistance Contact Guard Assistance   Assistive Device No Assistive Device   Bed to Chair   Bed <> Chair Technique Stand Pivot   Bed <> Chair Transfer Assistance Moderate Assistance   Bed <> Chair Assistive Device No Assistive " Device   Trials/Comments assist for steadying during pivot   Tub Bench Transfer   Tub Transfer Technique Stand Pivot   Tub Bench Transfer Assistance Moderate Assistance   Tub Transfer Assistive Device No Assistive Device   Trials/Comments stepping over tub assist for steadying only  (PTA pt did not use any AE in tub)   Toilet Transfer   Toilet Transfer Technique Stand Pivot   Toilet Transfer Assistance Moderate Assistance   Toilet Transfer Assistive Device bedside commode   Trials/Comments steadying assist only   Feeding   Feeding Level of Assistance Set-up Assistance   Feeding Where Assessed Wheelchair   Feeding Comments assist to open small containers, pt using L hand to self feed due to ataxia in R  (pt has dentures)   Grooming   Grooming Level of Assistance Stand by assistance   Grooming Where Assessed Wheelchair;Sitting sinkside   Grooming Comments for oral hygiene, pt dropping toiletries throughout   Bathing   Bathing Level of Assistance Minimum assistance   Bathing Where Assessed tub bench;Shower   Bathing Comments assist for steadying assist in standing, pt able to bathe 10/10 body parts   UE Dressing   UE Dressing Level of Assistance Minimum assistance   UE Dressing Where Assessed Wheelchair   UE Dressing Comments assist to manage down back of trunk   LE Dressing   LE Dressing Yes   LE Dressing  Level of Assistance Moderate assistance   LE Dressing Level of Assistance Moderate assistance  (steadying assist only)   Sock Level of Assistance Contact guard   Shoe Level of Assistance Stand by assistance   LE Dressing Where Assessed (seated on TTB)   LE Dressing Comments when crossing RLE over knee pt had LOB to R, needing CGA to remain upright   Toileting   Toileting Level of Assistance Moderate assistance   Toileting Where Assessed Toilet   Toileting Comments steadying assist only, pt manages clothing and perform elizabeth hygiene   Treatment   Treatment     Communication   Comprehension 4   Mode B   Expression 3    Mode B   Social Cognition   Social Interaction 4   Problem Solving 3   Memory 2     Comprehension:  Complex= humor, finances, rationale for medical treatment(hip precautions, pressure relief)  Basic= pain, hunger, thirst, bathroom needs, cold, nutrition, sleep    · Understands basic 75-89%- may need words repeated (4)  Understands basic 50-74%- May need parts of sentences repeated (        Expression:    - Expresses basic 50-74% of time - Needs to repeat parts of sentences  (3)    Social Interaction:  - Interacts appropriately 75-89% of time - needs redirection for appropriate language or to initiate interaction  (4)    .Problem Solving:  - Solves basic problems 50-74% of the time  (3)  Memory:    - Recognizes, recalls, or executes 25-49% of time 1 step of 2 step request  (2)     Activity Tolerance   Activity Tolerance Patient tolerated treatment well   Medical Staff Made Aware NSG   After Treatment   Patient Position After Treatment Seated in wheelchair   Patient after treatment left call button in reach   Assessment   Prognosis Good   Problem List Decreased Self Care skills;Decreased upper extremity strength;Decreased safe judgment during ADL;Decreased cognition;Decreased endurance;Decreased sensation;Decreased fine motor control;Decreased functional mobility;Decreased gross motor control;Decreased IADLs;Decreased Function of right upper extremity;Decreased trunk control for functional activities   Assessment Pt presents with ataxia in RUE, LEs, impaired balance, and decreaseed self care skills after cerebellar stroke. Pt  mostly limited by ataxia during self care and transfers, requiring assist mostly for steadying, even with use of RW for UE support. Pt is very motivated to therapy and appears to have good family support, anticipate good progress during IP rehab, however pt will likely still require light physical assistance and 24 hr supervision at discharge for safety.   Level of Motivation/Participation  good   Short Term Goals   Additional Documentation yes   Time For Goal Achievement 7 days   Pt Will Perform Supine To Sit Supervision;New goal   Supine to Sit - Met/Not Met Not Met   Pt Will Perform Sit to Supine Supervision   Pt Will Transfer Bed/Chair With RW;With minimal assist;New goal   Transfer Bed/Chair - Met/Not Met Not Met   Pt Will Perform Eating New goal  (set up)   Eating - Met/Not Met Not Met   Pt Will Perform Bathing With minimal assistance;New goal;On shower seat/tub bench   Bathing - Met/Not Met Not Met   Pt Will Perform UE Dressing With stand by assistance;New goal;In wheelchair   UE Dressing - Met/Not Met Not Met   Pt Will Perform LE Dressing With minimal assistance;New goal   LE Dressing - Met/Not Met Not Met   Pt Will Perform Toileting With minimal assistance;New goal   Toileting-Met/Not Met Not Met   Long Term Goals   Additional Documentation yes   Time For Goal Achievement 2 weeks   Pt Will Transfer Bed/Chair With RW;With contact guard assistance;New goal   Transfer Bed/Chair - Met/Not Met Not Met   Pt Will Transfer To Bedside Commode With RW;With contact guard assist;New goal   Transfer Bedside Commode -Met/Not Met Not Met   Pt Will Transfer To Shower With RW;With contact guard assist;New goal   Transfer to Shower-Met/Not Met Not Met   Pt Will Perform Eating With modified independence;New goal   Eating - Met/Not Met Not Met   Pt Will Perform Grooming New goal;Independently   Grooming - Met/Not Met Not Met   Pt Will Perform Bathing With contact guard assistance;New goal   Bathing - Met/Not Met Not Met   Pt Will Perform UE Dressing New goal;With modified independence   UE Dressing - Met/Not Met Not Met   Pt Will Perform LE Dressing With contact guard assistance;New goal   LE Dressing - Met/Not Met Not Met   Pt Will Perform Toileting With contact guard assistance;New goal   Toileting-Met/Not Met Not Met   Discharge Recommendations   Equipment Needed After Discharge 3-in-1 commode;bath bench    Discharge Facility/Level Of Care Needs home health OT   Plan   Plan Self care retraining;Functional transfer training;Equipment Training;UE thereex;Family training;Safety training;Fine motor training;Functional endurance training;Patient education;Postural control;Cognitive Retraining;Community re-training;AROM;AAROM;Neuromuscular Re-education;Functional Standing Activities;Continue with current plan   Therapy Frequency 2 times/day;Monday-Friday;Saturday or Sunday   Plan of Care Expires on 11/12/17   Occupational Therapy Follow-up   OT Follow-up? Yes   Treatment/Billable Minutes   Evaluation 60   Self Care/Home Management 30   Total Time 90     LEOBARDO Bustillos   10/12/2017

## 2017-10-12 NOTE — PROGRESS NOTES
Admit Assessment    Patient Identification  [address]   :  1945  Admit Date:  10/11/2017  Attending Provider:  Yordy Ponce MD              Referral:   Pt was admitted to  with a diagnosis of 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, and was admitted this hospital stay due to Right-sided cerebrovascular accident (CVA) [I63.9].   is involved was referred to the Social Work Department via (Referal).  Patient presents as a 72 y.o. year old       Living Situation:    Lives With: (P) spouse Resides at [address] [place] [address], phone: [phone] (home).      Functional Status Prior  Ambulation: 0-->independent  Transferrin-->independent  Toiletin-->independent  Bathin-->independent  Dressin-->independent  Eatin-->independent  Communication: 0-->understands/communicates without difficulty  Swallowin-->swallows foods/liquids without difficulty    Current or Past Agencies and Description of Services/Supplies    DME  Agency Name:   Agency Phone Number:   Equipment Currently Used at Home: (P) cane straight (TTB but she doesn't know where it is)    Outpatient Pharmacy:     [facility] - [place], [place] - [address] [place] [address]  Phone: [phone] Fax: [phone]    [facility] [address] [address]  Phone: [phone] Fax: [phone]    [facility] [address] [facility]  [address] [facility] [address]  Phone: [phone] Fax: [phone]      Patient Preference of agencies include: History of HH.Unable to recall agency.    Patient/Caregiver informed of right to choose providers or agencies.  Patient provides permission to release any necessary information to [facility] and to Non[facility] agencies as needed to facilitate  patient care, treatment planning, and patient discharge planning.  Written and verbal resources provided.      Coping          Adjustment to Diagnosis and Treatment: Adjusting appropriately    Emotional/Behavioral/Cognitive Issues              Social History     Social History Main Topics    Smoking status: Former Smoker     Packs/day: 0.00     Years: 0.00     Quit date: 1961    Smokeless tobacco: Never Used    Alcohol use No    Drug use: No    Sexual activity: Yes     Partners: Male         Financial:  Payor/Plan Subscr  Sex Relation Sub. Ins. ID Effective Group Num   1. [employer] MANAGE* JANE HAYES C* 1945 Female  [id number]                                   [address] [address]                            Other identified concerns/needs:    Plan: SW met with pt and spouse in room. Pt lives single story, 8 steps home with spouse. Step up in bathtub. Independent, drives, active prior to admission. Retired. Has had Hh, unable to recall agency. Four children, two live locally. Plan is to return home with family support and assistance and to resume independence.         providing psychosocial and supportive counseling, resources, education, assistance and discharge planning as appropriate.  Patient/caregiver state understanding of  available resources,  following, remains available.

## 2017-10-12 NOTE — PROGRESS NOTES
NURSE TECH FIM  REPORT    EATING  [x] Pt. opens packages, cuts food, pours liquids, and feeds self.  Regular consistency (7)  [] Pt. needs dentures, swallow technique, special foods or drink consistency (6)  [] Pt. needs supervision (cueing), set up (open containers, cut food, etc. (5)  [] Pt. needs assist with occasional scooping or checking for food pocketing (75-99%) (4)  [] Pt. needs assist to load each bite on utensils, pt.brings food to mouth (50-74%) (3)  [] Pt. needs assist to scoop, bring food to mouth (hand over hand) (25-49%) (2)  [] Pt. Is receiving IV fluids for hydration or tube feedings  (0-24%) (1)      GROOMING   [] Pt. performs all tasks independently and safely (7)  [] Pt. obtains all articles.  Needs adaptive/assistive device (such as wash mitt) (6)  [] Pt. needs supervision (cueing), set up (helper obtains articles, applies toothpaste, plugs razor, hands items to patient, opens containers, adjusts water temperature) (5)       [] Pt. doing 3 out of 4,  or 4 out of 5 tasks.  Needs assist to put in or remove dentures.  Needs steadying assist.(Pt. Doing 75-99%. (4)                                                                 [] Pt. doing 2 out of 4, or 3 out of 5 tasks (50-74%) (3)  [] Pt. doing 1 out of 4, or 2 out of 5 tasks (25-49%) (2)  [] Pt. doing 0 out of 4, or 1 out of 5 tasks or needs assistance of 2 helpers (0-24%) (1)  [x] Activity done with OT      BATHING  [] Pt. safely bathes whole body (10 parts of 10) (7)  [] Pt. doing 10 out of 10 body parts.  Uses adaptive devices (such as wash mitt, long handled sponge, etc.) (6)  []  Pt. doing 8-9 out of 10 body parts , or pt. needs steadying assistance. (75-99%) (4)  []  Pt. doing 10 out of 10 body parts buts needs supervision or set up (5)  [] Pt. doing 5-7 out of 10 body parts (50-74%) (3)  [] Pt. doing 3-4 of out 10 body parts (25-49%) (2)  [] Pt. doing 0-2 out of 10 body parts or needs assist of two helpers. (0-24%) (1)    [x] Activity  done with OT      DRESSING UPPER BODY  [] Pt. dresses independently and undresses independently, obtaining clothes from          storage area (7)  [] Pt. needs adaptive devices (such as Velcro fastener, reacher, button hook, etc.)          Applies abdominal binder or any orthotic.  Uses  walker for steadying. (6)  [] Pt. needs supervision (cueing), set up (helper brings clothes or applies orthotics (such as abdominal binder, TLSO, cervical collar) (5)  [] Pt. doing 75-99%. (4)  [] Pt. doing 50-74%. (3)  [] Pt. doing 25-49%. (2)  [] Pt. doing 0-24%, or needs assistance of 2 helpers. (1)  [x] Activity done with OT.      DRESSING LOWER BODY  [] Pt. independent with all parts of dressing lower body. (7)  [] Pt. needs adaptive devices ( such as reacher, long handled shoe horn, sock aid) (6)  [] Pt. needs supervision (cueing), set up(helper brings clothes or applies orthotic, such   as TALON hose, AFO) (5)  [] Pt. doing 75-99%.  Needs steadying assistance: buttoning pants, zipping a zipper,        fastening a belt, tying shoelaces, applying one sock/shoe. (4)  [] Pt. doing 50-74%. (3)  [] Pt. doing 25-49%. (2)  [] Pt. Doing 0-24%, or needs assistance of 2 helpers. (1)  [x] Activity done with OT.      TOILETING  [] Pt. doing 3 out of 3 tasks independently (pulling down clothes, cleansing elizabeth area,  pulling up clothes) (7)  [] Pt. doing 3 out of 3 tasks, but needs assistive device (grab bars, etc.) (6)  [] Pt. needs supervision (cueing), or set up (obtaining supplies for elizabeth care.) (5)  [] Pt. doing 3 out of 3 tasks, but needs steadying assistance with one or more parts. (75-90%) (4)  [] Pt. doing 2 out of 3 tasks (50-74%) (3)  [] Pt. doing out of 3 tasks (25-49%) (2)  [] Pt. needs assist (more than steadying) with all 3 tasks.  Needs assist of 2 helpers.    Incontinent of B/B today. (0-24%) (1)  [] Activity did not occur.      BLADDER  [] Pt.  uses toilet (7)  [] Pt. is on dialysis - does not urinate (7)  [x] Pt. uses  bedside commode (5)  [] Pt. uses bedpan - staff does ____% of tasks(includes rolling on/off, holding bedpan in place, hygiene, and emptying pan)   Pt. uses urinal - staff empties (5)                     []  Pt. needs help with positioning the urinal (4)                     []  Pt. needs help holding the urinal (1)  [] Pt. has lazo catheter/suprapubic catheter/concom cath - staff empties (1)  [] Pt. is on ICP:                     []  Pt. does catheterization (6)                     []  Staff does catheterization (1)  [] Pt. is incontinent - staff does ____% of tasks (includes clothes management,  hygiene, and changing diaper)  [] Number of accidents during this shift ____       BOWEL  [] Pt. uses toilet (7)  [] Pt. uses bedside commode (5)  [] Pt. uses bedpan - staff does ____% of tasks (includes rolling on/off, holding bedpan                                                          In place, hygiene, and emptying pan)  [] Pt. on bowel program:                    [] Pt.inserts suppository (6)                    [] Nurse inserts suppository (4)                    []  Nurse does dig. stim (1)  [] Pt. has colostomy - staff maintains care and empties (1)                    [] Pt. does ____% of care for colostomy  [] Pt. is incontinent - staff does ____% of tasks (includes clothes management,  hygiene, and changing diaper)  [] Number of accidents during this shift____  [x] No bowel movement this shift      TRANSFERS:  BED/CHAIR/WHEELCHAIR  [] Pt. transfers without assistive device into and out of wheelchair/bed/chair (7)  [] Pt. uses assistive/adaptive devices (grab bars, sliding board, walker, bed rails,  wheelchair armrests, etc.) (6)  [] Pt. needs supervision, cues, or head of bed raised by staff (5)  [x] Pt. needs steadying assist with any or all parts of transfer, or needs assist with one  leg during transfer (4)  [] Pt. needs lifting or lowering, or needs assist with 2 legs during transfer (3)  [] Pt. needs lifting  and lowering during transfer (2)  [] Pt. needs assist of 2 helpers or mechanical lift (1)  [] Activity did not occur   ________________________________________________________________  Includes lying to seated position at edge of bed.  Check assist level needed:  [] Used bedrails              []  Supervision                   [x]   Min. A  [] Mod A                          []  Max A                            []  Total A    If in wheelchair:  Check assist level needed:  [] Lock brakes                 []  Lifts/lowers footrests       []  Removes w/c arm                                                                                (for slide board transfers)    TRANSFER:  TOILET/BEDSIDE COMMODE   [] Pt. transfers from wheelchair or walking without assistive device to toilet.  Gets on and off a standard toilet. (7)  [] Pt. uses assistive/adaptive device (commode, grab bars, sliding board, walker prosthesis, orthotic, raised toilet seat) (6)  [x] Pt. needs supervision, cues, or set up (needs assist to lock brakes, remove leg or arm rest, position sliding board) (5)  [] Pt. needs steadying assist with any or all parts of transfer or needs assist with one leg during transfer. (4)  [] Pt. needs lifting or lowering or needs assist with two legs during transfer. (3)  [] Pt. needs lifting and lowering during transfer. (2)  [] Pt. needs assist of 2 helpers or mechanical lift. (1)  [] Activity did not occur.      TRANSFER:  SHOWER  [] Pt. transfers without assistive device into and out of shower. (7)  [] Pt. uses assistive/adaptive device (shower chair/bench, grab bars, sliding board,   walker, prothesis, orthotic, raised toilet seat. (6)  [] Pt. needs supervision, cues, or set up (needs assist to lock brakes, remove leg or armrest, position sliding board) (5)  [] Pt. needs steadying assist with any or all parts of transfer or needs assist with one      leg during transfer. (4)  [] Pt. needs lifting or lowering or needs assist  with two legs during transfer. (3)  [] Pt. needs lifting and lowering during transfer. (2)  [] Pt. needs assist of 2 helpers.  [place] pushes shower chair on wheels in and out of   shower. (1)  [x] Activity did not occur.                                                                                                                                                                                 r                                                                                                                    pT.                                  NURSE TECH FIM  REPORT    EATING  [] Pt. opens packages, cuts food, pours liquids, and feeds self.  Regular consistency (7)  [] Pt. needs dentures, swallow technique, special foods or drink consistency (6)  [] Pt. needs supervision (cueing), set up (open containers, cut food, etc. (5)  [] Pt. needs assist with occasional scooping or checking for food pocketing (75-99%) (4)  [] Pt. needs assist to load each bite on utensils, pt.brings food to mouth (50-74%) (3)  [] Pt. needs assist to scoop, bring food to mouth (hand over hand) (25-49%) (2)  [] Pt. Is receiving IV fluids for hydration or tube feedings  (0-24%) (1)      GROOMING   [] Pt. performs all tasks independently and safely (7)  [] Pt. obtains all articles.  Needs adaptive/assistive device (such as wash mitt) (6)  [] Pt. needs supervision (cueing), set up (helper obtains articles, applies toothpaste, plugs razor, hands items to patient, opens containers, adjusts water temperature) (5)       [] Pt. doing 3 out of 4,  or 4 out of 5 tasks.  Needs assist to put in or remove dentures.  Needs steadying assist.(Pt. Doing 75-99%. (4)                                                                 [] Pt. doing 2 out of 4, or 3 out of 5 tasks (50-74%) (3)  [] Pt. doing 1 out of 4, or 2 out of 5 tasks (25-49%) (2)  [] Pt. doing 0 out of 4, or 1 out of 5 tasks or needs assistance of 2 helpers (0-24%) (1)  [] Activity done with  OT      BATHING  [] Pt. safely bathes whole body (10 parts of 10) (7)  [] Pt. doing 10 out of 10 body parts.  Uses adaptive devices (such as wash mitt, long handled sponge, etc.) (6)  []  Pt. doing 8-9 out of 10 body parts , or pt. needs steadying assistance. (75-99%) (4)  []  Pt. doing 10 out of 10 body parts buts needs supervision or set up (5)  [] Pt. doing 5-7 out of 10 body parts (50-74%) (3)  [] Pt. doing 3-4 of out 10 body parts (25-49%) (2)  [] Pt. doing 0-2 out of 10 body parts or needs assist of two helpers. (0-24%) (1)    [] Activity done with OT      DRESSING UPPER BODY  [] Pt. dresses independently and undresses independently, obtaining clothes from          storage area (7)  [] Pt. needs adaptive devices (such as Velcro fastener, reacher, button hook, etc.)          Applies abdominal binder or any orthotic.  Uses  walker for steadying. (6)  [] Pt. needs supervision (cueing), set up (helper brings clothes or applies orthotics (such as abdominal binder, TLSO, cervical collar) (5)  [] Pt. doing 75-99%. (4)  [] Pt. doing 50-74%. (3)  [] Pt. doing 25-49%. (2)  [] Pt. doing 0-24%, or needs assistance of 2 helpers. (1)  [] Activity done with OT.      DRESSING LOWER BODY  [] Pt. independent with all parts of dressing lower body. (7)  [] Pt. needs adaptive devices ( such as reacher, long handled shoe horn, sock aid) (6)  [] Pt. needs supervision (cueing), set up(helper brings clothes or applies orthotic, such   as TALON hose, AFO) (5)  [] Pt. doing 75-99%.  Needs steadying assistance: buttoning pants, zipping a zipper,        fastening a belt, tying shoelaces, applying one sock/shoe. (4)  [] Pt. doing 50-74%. (3)  [] Pt. doing 25-49%. (2)  [] Pt. Doing 0-24%, or needs assistance of 2 helpers. (1)  [] Activity done with OT.      TOILETING  [] Pt. doing 3 out of 3 tasks independently (pulling down clothes, cleansing elizabeth area,  pulling up clothes) (7)  [] Pt. doing 3 out of 3 tasks, but needs assistive device (grab  bars, etc.) (6)  [] Pt. needs supervision (cueing), or set up (obtaining supplies for elizabeth care.) (5)  [] Pt. doing 3 out of 3 tasks, but needs steadying assistance with one or more parts. (75-90%) (4)  [] Pt. doing 2 out of 3 tasks (50-74%) (3)  [] Pt. doing out of 3 tasks (25-49%) (2)  [] Pt. needs assist (more than steadying) with all 3 tasks.  Needs assist of 2 helpers.    Incontinent of B/B today. (0-24%) (1)  [] Activity did not occur.      BLADDER  [] Pt.  uses toilet (7)  [] Pt. is on dialysis - does not urinate (7)  [] Pt. uses bedside commode (5)  [] Pt. uses bedpan - staff does ____% of tasks(includes rolling on/off, holding bedpan in place, hygiene, and emptying pan)   Pt. uses urinal - staff empties (5)                     []  Pt. needs help with positioning the urinal (4)                     []  Pt. needs help holding the urinal (1)  [] Pt. has lazo catheter/suprapubic catheter/concom cath - staff empties (1)  [] Pt. is on ICP:                     []  Pt. does catheterization (6)                     []  Staff does catheterization (1)  [] Pt. is incontinent - staff does ____% of tasks (includes clothes management,  hygiene, and changing diaper)  [] Number of accidents during this shift ____       BOWEL  [] Pt. uses toilet (7)  [] Pt. uses bedside commode (5)  [] Pt. uses bedpan - staff does ____% of tasks (includes rolling on/off, holding bedpan                                                          In place, hygiene, and emptying pan)  [] Pt. on bowel program:                    [] Pt.inserts suppository (6)                    [] Nurse inserts suppository (4)                    []  Nurse does dig. stim (1)  [] Pt. has colostomy - staff maintains care and empties (1)                    [] Pt. does ____% of care for colostomy  [] Pt. is incontinent - staff does ____% of tasks (includes clothes management,  hygiene, and changing diaper)  [] Number of accidents during this shift____  [] No bowel  movement this shift      TRANSFERS:  BED/CHAIR/WHEELCHAIR  [] Pt. transfers without assistive device into and out of wheelchair/bed/chair (7)  [] Pt. uses assistive/adaptive devices (grab bars, sliding board, walker, bed rails,  wheelchair armrests, etc.) (6)  [] Pt. needs supervision, cues, or head of bed raised by staff (5)  [] Pt. needs steadying assist with any or all parts of transfer, or needs assist with one  leg during transfer (4)  [] Pt. needs lifting or lowering, or needs assist with 2 legs during transfer (3)  [] Pt. needs lifting and lowering during transfer (2)  [] Pt. needs assist of 2 helpers or mechanical lift (1)  [] Activity did not occur   ________________________________________________________________  Includes lying to seated position at edge of bed.  Check assist level needed:  [] Used bedrails              []  Supervision                   []   Min. A  [] Mod A                          []  Max A                            []  Total A    If in wheelchair:  Check assist level needed:  [] Lock brakes                 []  Lifts/lowers footrests       []  Removes w/c arm                                                                                (for slide board transfers)    TRANSFER:  TOILET/BEDSIDE COMMODE   [] Pt. transfers from wheelchair or walking without assistive device to toilet.  Gets on and off a standard toilet. (7)  [] Pt. uses assistive/adaptive device (commode, grab bars, sliding board, walker prosthesis, orthotic, raised toilet seat) (6)  [] Pt. needs supervision, cues, or set up (needs assist to lock brakes, remove leg or arm rest, position sliding board) (5)  [] Pt. needs steadying assist with any or all parts of transfer or needs assist with one leg during transfer. (4)  [] Pt. needs lifting or lowering or needs assist with two legs during transfer. (3)  [] Pt. needs lifting and lowering during transfer. (2)  [] Pt. needs assist of 2 helpers or mechanical lift. (1)  []  Activity did not occur.      TRANSFER:  SHOWER  [] Pt. transfers without assistive device into and out of shower. (7)  [] Pt. uses assistive/adaptive device (shower chair/bench, grab bars, sliding board,   walker, prothesis, orthotic, raised toilet seat. (6)  [] Pt. needs supervision, cues, or set up (needs assist to lock brakes, remove leg or armrest, position sliding board) (5)  [] Pt. needs steadying assist with any or all parts of transfer or needs assist with one      leg during transfer. (4)  [] Pt. needs lifting or lowering or needs assist with two legs during transfer. (3)  [] Pt. needs lifting and lowering during transfer. (2)  [] Pt. needs assist of 2 helpers.  [place] pushes shower chair on wheels in and out of   shower. (1)  [] Activity did not occur.                                                                                                                                                                                 r                                                                                                                    pT.

## 2017-10-12 NOTE — PROGRESS NOTES
"Physical Therapy  Evaluation/ Treatment    Naty St   MRN: [id number]                10/12/17 1300   PT Time Calculation   PT Start Time 1300   PT Stop Time 1430   PT Total Time (min) 90 min   Treatment   Treatment Type Evaluation;other (see comments)  (Treatment)   General   PT Received On 10/12/17   Chart Reviewed Yes   Onset of Illness/Injury or Date of Surgery 10/08/17   Diagnosis R cerebellar infarct   Additional Pertinent History see MD H and P   Date Referred to PT 10/12/17   Referring Physician Dr. Ponce   Family/Caregiver Present Yes  ()   Patient Found (position) Seated in wheelchair   Pt found with (posey belt)   Precautions   General Precautions aspiration;fall   Visual/Auditory Vision impaired;Other (see comments)  (pt wears glasses( pt not wearing during eval))   Previous Level of Function   Ambulation Skills independent   Transfer Skills independent   ADL Skills independent   Work/Leisure Activity independent   Living Environment   Lives With spouse   Living Arrangements house   Home Accessibility stairs to enter home   Home Layout Able to live on 1st floor   Number of Stairs to Enter Home 8   Stair Railings at Home outside, present at both sides;other (see comments)  (pt unable to reach both rails at same time( wide spacing))   Transportation Available family or friend will provide   Living Environment Comment Pt lives in [place] with her  and was independent PTA, including driving.  Pt reports her hobbies are watching TV and helping with housework;  does the cooking.  Pt's roles/responsibilities: wife, mother, grandmother.  Pt is a retired teacher.   Equipment Currently Used at Home bedside commode;cane, straight;other (see comments)  (pt reports she may have tub bench but not sure where it is)   Subjective   Patient states " sleepy," when asked how she is.   Patient stated goals " to slow down.... balance."   Pain/Comfort   Pain Rating 1 no pain   Pain Rating " Post-Intervention 1 no pain   Cognitive Status   Level of Consciousness (AVPU) alert   Arousal Level opens eyes spontaneously   Orientation oriented x 4   Able to Follow Commands (Communication) mild impairment   Speech other (see comments);follows commands  (decreased voice volume noted; mild word finding difficulty)   Mood/Behavior calm;cooperative   Sensory Examination   Additional Documentation yes   Light Touch   LUE w/i normal limits  (pt reports occasional tingling to hypothenar region)   RUE w/i normal limits   LLE w/i normal limits   RLE w/i normal limits   Proprioception   LUE w/i normal limits   RUE w/i normal limits   LLE w/i normal limits   RLE w/i normal limits   Range of Motion (ROM)   Range of Motion Examination bilateral upper extremity ROM was WFL;deficits as listed below;other (see comments)  (B LE WFL except for hip IR( not measured))   Manual Muscle Testing (MMT)   Manual Muscle Testing Results other (see comments)  (see comments section below)   Tone   Right UE  normal   Left UE normal   Right LE normal   Left LE normal   Fine Motor Coordination   Left Hand, Finger to Nose normal performance   Right Hand, Finger to Nose mild impairment   RLE Heel to [place] mild impairment   LLE Heel to [place] normal performance   Observation   Appearance thin female seated in w/c in NAD   Posture Kyphosis in Thoracic Spine;Rounded shoulders;Posterior pelvic tilt;Other (see comments)  (forward head~ all noted in sitting)   Skin intact   Bed Mobility   Bed Mobility yes   Rolling/Turning to Left Supervision  (cues)   Rolling/Turning Right Supervision  (cues)   Scooting/Bridging Minimum Assistance   Supine to Sit Modified Independent   Sit to Supine Modified Independent   Transfers   Transfer yes   Sit to Stand   Sit <> Stand Assistance Minimum Assistance   Sit <> Stand Assistive Device No Assistive Device   Trials/Comments multiple trials~ steadying during transition and cues for forward lean   Stand to Sit    Assistance Minimum Assistance;Moderate Assistance   Assistive Device No Assistive Device   Trials/Comments multiple trials~ cues for hand placement and occasional lowering to mat   Chair to Mat   Chair<> Mat Technique Stand Pivot   Chair<>Mat Assistance Minimum Assistance   Chair <> Mat Assistive Device No Assistive Device   Trials/Comments 1 trial~ light assistance for lowering at times~ cues for forward lean during sit> stand  (FIM=3)   Mat to Chair   Technique Stand Pivot   Assistance Minimum Assistance   Assistive Device No Assistive Device   Trials/Comments 1 trial~ light assistance for lowering at times  (FIM=3)   Tub Bench Transfer   Trials/Comments PTA, pt was able to lower into bottom of tub and bathe. Pt would require 2 helper A for this now for safety, so FIM=1.  Pt was able to step into and out of tub with hands placed on wall and min A for steadying during transfer.  Pt sits and stands from tub bench and exits tub by stepping out with min A for balance and safety.   Toilet Transfer   Toilet Transfer Technique Stand Pivot   Toilet Transfer Assistance Minimum Assistance   Toilet Transfer Assistive Device No Assistive Device   Trials/Comments 1 trial~ pt needs mostly steadying but occasional slowing for stand to sit.  (FIM=3)   Wheelchair Activities   Propulsion Yes   Propulsion Type 1 Manual   Level 1 Level tile   Method 1 Right upper extremity;Left upper extremity   Level of Assistance 1 Stand by assistsance   Description/ Details 1 pt propels w/c x 50 feet with cues for techinque and performs 2 turns with heavy cues for correct movement of hands   Gait   Gait Yes   Weight Bearing Status full   Gait 1   Surface 1 Level tile   Gait Assistive Device No device   Assistance 1 Moderate assistance   Gait Distance 50 feet x 2 trials~ pt takes short standing rest break   Gait Pattern reciprocal   Gait Deviation(s) decreased leila;increased time in double stance;decreased velocity of limb motion;decreased  step length;decreased stride length;decreased swing-to-stance ratio;decreased weight-shifting ability;lateral lean;forward lean;other (see comments)  (narrow MERON)   Impairments Contributing to Gait Deviations impaired balance;impaired coordination;impaired motor control;impaired postural control;decreased strength   Stairs   Stairs Yes   Stairs/Curb Step   Rails 1 Bilateral;Left   Device 1 No device   Assistance 1 Minimum assistance  (light steadying)   Comment/# Steps 1 pt ascends and descends 12 steps with B rails for 1st 4 and L rail for next 8 steps.   Stairs/ Curb Step  2   Rails 2 None (Comment)   Device 2 No device   Assistance 2 Moderate assistance   Comment/# Steps 2 pt ascends and descends 4 inch curb with mod A for balance and safety   Endurance Deficit   Endurance Deficit Yes   Endurance Deficit Description pt not at PLOF   Balance   Balance Yes   Static Sitting Balance   Static Sitting-Balance Support Right upper extremity supported;Left upper extremity supported;Feet supported   Static Sitting-Level of Assistance Supervision   Static Sitting-Comment/# of Minutes pt sits edge of mat while PT assesses posture   Static Standing Balance   Static Standing-Balance Support No upper extremity supported;Right upper extremity supported;Left upper extremity supported   Static Standing-Level of Assistance Minimum assistance;Contact guard   Static Standing-Comment/# of Minutes pt stands near edge of mat x 2: 40 with cues to prevent post drift   Activity Tolerance   Activity Tolerance Patient tolerated treatment well   After Treatment   Patient Position After Treatment Seated in wheelchair;Other (comment)  (posey belt in place)   Patient after treatment left (pt escorted to room)   Treatment   Treatment Evaluation; Treatment.  PT educated pt regarding PT eval and POC; appropriate QI items completed with exception of ascend and descend ramp( safety concerns; pt picked up object from floor with mod A.  Pt performs  "numerous sit<> stand trials from mat with cues for improved performance and also tosses/catches beach ball from w/c x 5 minutes for improved UE coordination.   Assessment   Prognosis Good   Problem List Decreased strength;Decreased endurance;Impaired balance;Decreased mobility;Decreased coordination;Impaired vision  (cognition????)   Assessment Pt is a pleasant 71 y/o female who comes to rehab following recent cerebellar infarct.  Pt is currently limited by decreased balance, coordination, endurance, postural control(mostly in standing) and mild ataxia.  Pt seems interested in therapy program and should make good functional gains here. Expect pt to require AD for ambulation due to current balance deficits, and she may need light assistance/guarding here at discharge.    Level of Motivation/Participation good   Barriers to Discharge None   Barriers to Discharge Comments  will be with pt at discharge as he is retired   Short Term Goals   Additional Documentation yes   Time For Goal Achievement 7 days   Pt Will Logroll Modified Indepent   Pt Will Transfer Sit to Stand With contact guard assistance   Pt Will Transfer Bed/Chair Stand Pivot;With contact guard assistance   Pt Will Ambulate  feet;101-150 feet;With RW;With minimal assist   Pt Will Go Up / Down Stairs 1 flight;With contact guard assist;With 2 Rails   Pt Will Go Up / Down Curb Step 4" curb step;With RW;With minimal assist   Pt Will Stand 3-5 min;With contact guard assist;Without assitive device   Pt Will Tolerate Exercise 11-15 reps;With active assist   Pt Will Propel Wheelchair  feet;With stand by assist;With (B) UE;With (B) LE   Other Goal stand pivot transfers w/c<> 3 in 1 commode with CGA   Other Goal 2 step into and out of tub with min/CGA   Long Term Goals   Additional Documentation yes   Time For Goal Achievement 2 weeks   Pt Will Transfer Sit to Stand With stand by assist   Pt Will Transfer Bed/Chair Stand Pivot;With stand by assist " "  Pt Will Ambulate 151-200 feet;With RW;With contact guard assistance   Pt Will Go Up / Down Stairs 1 flight;With 1 Rail;With contact guard assist   Pt Will Go Up / Down Curb Step 4" curb step;With RW;With contact guard assist   Pt Will Stand 3-5 min;Without assitive device;With stand by assist   Pt Will Tolerate Exercise 15-20 reps;With stand by assist   Pt Will Propel Wheelchair > 150 feet;Supervision;With (B) UE;With (B) LE   Other Goal stand pivot transfers w/c<> 3 in 1 commode with SBA   Other Goal 2 step into and out of tub with CGA   Other Goal 3  to be independent with assistance needed by pt at time of discharge   Discharge Recommendations   Equipment Needed After Discharge bath bench;walker, rolling;wheelchair   Discharge Facility/Level Of Care Needs outpatient PT   Plan   Planned Therapy Intervention Plan of care initiated;Bed mobility training;Transfer training;Gait training;Balance Training;Strengthening;Neuromuscular Re-education;Motor Coordination Training;Postural Re-education;Wheelchair Management/Propulsion   Therapy Frequency 2 times/day;daily;Monday-Friday;Saturday or Sunday   Physical Therapy Follow-up   PT Follow-up? Yes   PT - Next Visit Date 10/13/17   Other Comments   Comments LE MMT performed from w/c as follows:  L =4 hip flex, 5 knee ext, 4+ knee flex, 5 ankle df/ pf ;  R= 4(-) hip flex, 5 knee ext, 4+ knee flex, 5 ankle df/pf   Treatment/Billable Minutes   Evaluation 60   Therapeutic Activity 15   Neuromuscular Re-education 15   Total Time 90           Ruddy Sanchez, PT 10/12/2017          "

## 2017-10-12 NOTE — ASSESSMENT & PLAN NOTE
PT/OT/SLP    Transfers   Bed/Chair/WC 3   Toilet 3   Tub 1   Self Care   Eating 5   Grooming 5   Bathing 4   Dressing-Upper 4   Dressing-Lower 3   Toileting 3   Communication   Comprehension 4   Mode B   Expression 3   Mode B   Social Cognition   Social Interaction 4   Problem Solving 3   Memory 2

## 2017-10-12 NOTE — H&P
[facility]  Physical Medicine & Rehab  History & Physical    Patient Name: Naty St  MRN: [id number]  Admission Date: 10/11/2017  Attending Physician: Yordy Ponce MD   Primary Care Provider: Olvin Mars MD    Subjective:     Principal Problem: 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    HPI: Naty St is a 72-year-old female with PMHx of Left frontal meningioma (s/p craniotomy 1/2016), DVT, Breast CA (1994), HTN, pacreatic CA (1998), serizures, and psychiatric disorder .  Patient [facility] with speech difficulties, blurry vision, vomiting, and vertigo.  CTH revealed no acute pathology. She did not receive tPA 2/2 outside treatment window.  MRI revealed possible R MCA acute/recent infarct; however, repeat MRI does not reveal R MCA acute infarct.  MRA head/neck revealed probable occlusion of the R superior cerebral artery.  VN following.      Functional History: Patient lives in [place] with  in a single story home with 8 steps to enter.  Prior to admission, (I) with ADLs and mobility.  DME: Sc.      Current Functional Status:  Participating with therapy.  Bed mobility SV-SBA.  Sit to stand Yamil and transfers ModA.  UBD Yamil and LBD ModA.      Past Medical History:   Diagnosis Date    Anticoagulant long-term use     Blood clot in vein 06/2016    Breast cancer 1994    Cataract     Hypertension     Pancreatic cancer 1998    Posterior capsular opacification, left eye     Psychiatric problem     Seizures 01/25/2016    Stroke     Therapy      Past Surgical History:   Procedure Laterality Date    BONE BIOSPY  3/9/16    BRAIN SURGERY  1/12/16    tumor removal 1/12/16    BREAST LUMPECTOMY  1998    left    CATARACT EXTRACTION Bilateral 2004    yag OU    CHOLECYSTECTOMY  1998    COLONOSCOPY N/A 11/13/2015    Procedure: COLONOSCOPY;  Surgeon: Alex Carmona MD;  Location: [facility] ([address] [place] [address]);  Service: Endoscopy;   "Laterality: N/A;  2 year f/u    cysto and right ureteral stent  4/27/12    ecoli  2010    removal of blockage, done throat, then through the liver.    HYSTERECTOMY  1974    KIDNEY STONE SURGERY  2010--laser    left eswl  6/20/12    OOPHORECTOMY  4/25/2012    Laparoscopic BSO, lysis of adhesions, cystoscopy greater than 35mins     WHIPPLE PROCEDURE W/ LAPAROSCOPY  1998     Review of patient's allergies indicates:   Allergen Reactions    Tobradex [tobramycin-dexamethasone] Swelling    Iodinated contrast- oral and iv dye Hives    Morphine Other (See Comments)     "shaking" and tremors    Latex Rash    Phenytoin sodium extended Other (See Comments) and Rash       Scheduled Medications:    amitriptyline  50 mg Oral QHS    amlodipine  5 mg Oral Daily    atorvastatin  40 mg Oral Daily    levetiracetam  750 mg Oral BID    lipase-protease-amylase 12,000-38,000-60,000 units  1 capsule Oral TID WM    magnesium oxide  400 mg Oral BID    metoprolol succinate  50 mg Oral Daily    rivaroxaban  20 mg Oral Daily with dinner       PRN Medications: bisacodyl, influenza, lorazepam, magnesium hydroxide 400 mg/5 ml, ondansetron, pneumoc 13-marcus conj-dip cr(PF), promethazine, ramelteon    Family History     Problem Relation (Age of Onset)    Breast cancer Mother    Leukemia Paternal Grandfather    Lung cancer Mother    Stomach cancer Paternal Grandmother        Social History Main Topics    Smoking status: Former Smoker     Packs/day: 0.00     Years: 0.00     Quit date: 9/26/1961    Smokeless tobacco: Never Used    Alcohol use No    Drug use: No    Sexual activity: Yes     Partners: Male     Review of Systems   Constitutional: Positive for activity change and fatigue. Negative for appetite change, chills and fever.   HENT: Negative for congestion, sinus pressure and sore throat.    Eyes: Negative for photophobia, pain and visual disturbance.   Respiratory: Negative for cough, shortness of breath and wheezing.  "   Cardiovascular: Negative for chest pain and palpitations.   Gastrointestinal: Negative for diarrhea, nausea and vomiting.   Endocrine: Negative for cold intolerance and heat intolerance.   Genitourinary: Negative for dysuria.   Musculoskeletal: Positive for arthralgias and gait problem.   Skin: Negative for color change.   Neurological: Positive for dizziness and weakness.   Hematological: Negative for adenopathy.   Psychiatric/Behavioral: Negative for agitation and hallucinations.     Objective:     Vital Signs (Most Recent):  Temp: 98.4 °F (36.9 °C) (10/12/17 0655)  Pulse: 70 (10/12/17 0655)  Resp: 18 (10/12/17 0655)  BP: 123/64 (10/12/17 0655)  SpO2: 95 % (10/12/17 0655)    Vital Signs (24h Range):  Temp:  [97.4 °F (36.3 °C)-98.4 °F (36.9 °C)] 98.4 °F (36.9 °C)  Pulse:  [67-74] 70  Resp:  [18] 18  SpO2:  [95 %-98 %] 95 %  BP: (120-129)/(59-64) 123/64     Body mass index is 23.19 kg/m².    Physical Exam   Constitutional: She appears well-developed and well-nourished.   HENT:   Head: Normocephalic and atraumatic.   Eyes: EOM are normal. Pupils are equal, round, and reactive to light.   Neck: Normal range of motion. Neck supple.   Cardiovascular: Normal rate, regular rhythm, normal heart sounds and intact distal pulses.    Pulmonary/Chest: Effort normal and breath sounds normal.   Abdominal: Soft. Bowel sounds are normal.   Musculoskeletal: Normal range of motion.   Neurological:   -  Mental Status:  AAOx3.  Follows commands.  Answers correct age and .  Recent and remote memory intact.  No neglect.    -  Speech and language:  - aphasia + dysarthria.    -  Coordination:  Finger to nose exam:  RUE mild dysmetria, LUE mild dysmetria.   -  Motor:  RUE: 4/5, 4/5 .  LUE: 5/5, 5/5 .  RLE: 5/5, DF 5/5, PF 5/5.  LLE: 5/5, DF 5/5, PF 5/5.   -  pronator drift. negative  -  Tone:  normal  -  Sensory:  Intact to light touch and pin prick.   Skin: Skin is warm and dry. Capillary refill takes less than 2 seconds.    Psychiatric: She has a normal mood and affect. Her behavior is normal. Judgment and thought content normal.     NEUROLOGICAL EXAMINATION:     CRANIAL NERVES     CN III, IV, VI   Pupils are equal, round, and reactive to light.  Extraocular motions are normal.       Diagnostic Results: Labs: Reviewed  MRI: Reviewed    Assessment/Plan:     Naty St is a 72 y.o. female being admitted to inpatient rehabilitation on 10/11/2017 for 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 with impaired mobility and ADLs.     * 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    PT/OT/SLP        Ataxia    Likely 2/2 stroke. Continue working with PT/OT        CKD (chronic kidney disease) stage 3, GFR 30-59 ml/min    Currently stable. Will continue to monitor        Hypomagnesemia    1.3 today. Will start Mag Oxide 400mg BID. Continue to monitor        Essential hypertension    Controlled. Continue meds.            Yordy Ponce MD  Department of Physical Medicine & Rehab   [facility]    Post Admission Assessment:    Naty St is a 72 y.o. female being admitted to inpatient rehabilitation on 10/11/2017 for CVA with impaired mobility and ADLs.   Patient is appropriate for inpatient rehabilitation and is expected to tolerate 3 hours of therapy a day or 15 hours of therapy a week.  Patient is expected to benefit from the following therapy services: Physical Therapy, Occupational Therapy, Speech Therapy, as well as Recreational Therapy  Impairments include: Impaired balance, Decreased Endurance, Impaired activity tolerance  Goals: Supervision to Mod I with Mobility, ADLs, Transfers using LRAD   Precautions:  Fall, Aspiration  ELOS: 10-14 days   Disposition: Home with family     I have had the opportunity to examine the patient within 24 hours of admission and have reviewed the Pre-Admission Assessment and find it consistent with my examination and evaluation  of the patient. I confirm that this patient is appropriate for admission and treatment in this inpatient rehabilitation hospital, needs intense interdisciplinary rehabilitation care under my direction and is expected to achieve meaningful goals within a reasonable period of time that are consistent with the planned discharge disposition.     The patient will be admitted for inpatient comprehensive interdisciplinary rehabilitation to address the impairments and medical conditions listed above while assessing equipment needs and compensatory strategies, with coordinated interdisciplinary services that will include physical therapy, occupational therapy, and close monitoring and treatment with 24-hour rehabilitative nursing. This interdisciplinary program will be performed under the direction of a physiatrist.

## 2017-10-12 NOTE — PLAN OF CARE
Problem: Patient Care Overview  Goal: Plan of Care Review  Outcome: Ongoing (interventions implemented as appropriate)  Patient has denied any pain this shift. Patient is up in wheelchair with assist with no falls or injuries noted. Patient is stable with no distress noted. Plan of care explained to patient and verbal acknowledgement of understanding from patient. Patient is resting with call light in reach, bed in low,locked position with bed alarm set. Will continue to monitor.

## 2017-10-12 NOTE — PROGRESS NOTES
Occupational Therapy   FIM Scores    Naty St   MRN: [id number]      10/12/17 0830   Transfers   Bed/Chair/WC 3   Toilet 3   Tub 1   Self Care   Eating 5   Grooming 5   Bathing 4   Dressing-Upper 4   Dressing-Lower 3   Toileting 3   Communication   Comprehension 4   Mode B   Expression 3   Mode B   Social Cognition   Social Interaction 4   Problem Solving 3   Memory 2     LEOBARDO Bustillos   10/12/2017

## 2017-10-13 ENCOUNTER — INFUSION (OUTPATIENT)
Dept: INFUSION THERAPY | Facility: HOSPITAL | Age: 72
End: 2017-10-13
Attending: INTERNAL MEDICINE
Payer: MEDICARE

## 2017-10-13 DIAGNOSIS — C34.31 MALIGNANT NEOPLASM OF LOWER LOBE OF RIGHT LUNG: Primary | ICD-10-CM

## 2017-10-13 LAB
ANION GAP SERPL CALC-SCNC: 6 MMOL/L
BUN SERPL-MCNC: 19 MG/DL
CALCIUM SERPL-MCNC: 8.2 MG/DL
CHLORIDE SERPL-SCNC: 118 MMOL/L
CO2 SERPL-SCNC: 22 MMOL/L
CREAT SERPL-MCNC: 1.5 MG/DL
EST. GFR  (AFRICAN AMERICAN): 39.6 ML/MIN/1.73 M^2
EST. GFR  (NON AFRICAN AMERICAN): 34.3 ML/MIN/1.73 M^2
GLUCOSE SERPL-MCNC: 96 MG/DL
MAGNESIUM SERPL-MCNC: 1.7 MG/DL
POTASSIUM SERPL-SCNC: 4.2 MMOL/L
SODIUM SERPL-SCNC: 146 MMOL/L

## 2017-10-13 PROCEDURE — 97530 THERAPEUTIC ACTIVITIES: CPT

## 2017-10-13 PROCEDURE — 63600175 PHARM REV CODE 636 W HCPCS: Performed by: PHYSICAL MEDICINE & REHABILITATION

## 2017-10-13 PROCEDURE — 99232 SBSQ HOSP IP/OBS MODERATE 35: CPT | Mod: ,,, | Performed by: PHYSICAL MEDICINE & REHABILITATION

## 2017-10-13 PROCEDURE — 97542 WHEELCHAIR MNGMENT TRAINING: CPT

## 2017-10-13 PROCEDURE — 83735 ASSAY OF MAGNESIUM: CPT

## 2017-10-13 PROCEDURE — 80048 BASIC METABOLIC PNL TOTAL CA: CPT

## 2017-10-13 PROCEDURE — 63600175 PHARM REV CODE 636 W HCPCS: Performed by: NURSE PRACTITIONER

## 2017-10-13 PROCEDURE — 25000003 PHARM REV CODE 250: Performed by: STUDENT IN AN ORGANIZED HEALTH CARE EDUCATION/TRAINING PROGRAM

## 2017-10-13 PROCEDURE — 97112 NEUROMUSCULAR REEDUCATION: CPT

## 2017-10-13 PROCEDURE — 96401 CHEMO ANTI-NEOPL SQ/IM: CPT

## 2017-10-13 PROCEDURE — 97116 GAIT TRAINING THERAPY: CPT

## 2017-10-13 PROCEDURE — 97802 MEDICAL NUTRITION INDIV IN: CPT

## 2017-10-13 PROCEDURE — 36415 COLL VENOUS BLD VENIPUNCTURE: CPT

## 2017-10-13 PROCEDURE — 12800000 HC REHAB SEMI-PRIVATE ROOM

## 2017-10-13 PROCEDURE — 25000003 PHARM REV CODE 250: Performed by: PHYSICAL MEDICINE & REHABILITATION

## 2017-10-13 RX ORDER — DRONABINOL 2.5 MG/1
5 CAPSULE ORAL
Status: DISCONTINUED | OUTPATIENT
Start: 2017-10-13 | End: 2017-10-26 | Stop reason: HOSPADM

## 2017-10-13 RX ORDER — AMOXICILLIN 250 MG
1 CAPSULE ORAL 2 TIMES DAILY
Status: DISCONTINUED | OUTPATIENT
Start: 2017-10-13 | End: 2017-10-26 | Stop reason: HOSPADM

## 2017-10-13 RX ADMIN — PANCRELIPASE 1 CAPSULE: 60000; 12000; 38000 CAPSULE, DELAYED RELEASE PELLETS ORAL at 05:10

## 2017-10-13 RX ADMIN — Medication 400 MG: at 07:10

## 2017-10-13 RX ADMIN — LEVETIRACETAM 750 MG: 750 TABLET ORAL at 07:10

## 2017-10-13 RX ADMIN — ATORVASTATIN CALCIUM 40 MG: 20 TABLET, FILM COATED ORAL at 07:10

## 2017-10-13 RX ADMIN — PANCRELIPASE 1 CAPSULE: 60000; 12000; 38000 CAPSULE, DELAYED RELEASE PELLETS ORAL at 07:10

## 2017-10-13 RX ADMIN — RIVAROXABAN 20 MG: 20 TABLET, FILM COATED ORAL at 05:10

## 2017-10-13 RX ADMIN — LEVETIRACETAM 750 MG: 750 TABLET ORAL at 08:10

## 2017-10-13 RX ADMIN — AMITRIPTYLINE HYDROCHLORIDE 50 MG: 25 TABLET, FILM COATED ORAL at 08:10

## 2017-10-13 RX ADMIN — AMLODIPINE BESYLATE 5 MG: 5 TABLET ORAL at 07:10

## 2017-10-13 RX ADMIN — DENOSUMAB 120 MG: 120 INJECTION SUBCUTANEOUS at 01:10

## 2017-10-13 RX ADMIN — METOPROLOL SUCCINATE 50 MG: 50 TABLET, EXTENDED RELEASE ORAL at 07:10

## 2017-10-13 RX ADMIN — Medication 400 MG: at 08:10

## 2017-10-13 RX ADMIN — DRONABINOL 5 MG: 2.5 CAPSULE ORAL at 05:10

## 2017-10-13 NOTE — PLAN OF CARE
Problem: Patient Care Overview  Goal: Plan of Care Review  Outcome: Ongoing (interventions implemented as appropriate)  Plan of care reviewed: Free of falls or injury, safe environment maintained, frequent safety rounds maintained. Skin integrity maintained. Vital signs WNL and medications as scheduled.

## 2017-10-13 NOTE — PROGRESS NOTES
[facility]  Physical Medicine & Rehab  Progress Note    Patient Name: Naty St  MRN: [id number]  Patient Class: IP- Rehab   Admission Date: 10/11/2017  Length of Stay: 2 days  Attending Physician: Yordy Ponce MD  Primary Care Provider: Olvin Mars MD    Subjective:     Principal Problem: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    Interval History: Pt without new c/o's.  I have reviewed the HPI and PMFSH and there are no changes from admission.       Scheduled Medications:    amitriptyline  50 mg Oral QHS    amlodipine  5 mg Oral Daily    atorvastatin  40 mg Oral Daily    dronabinol  5 mg Oral BID AC    levetiracetam  750 mg Oral BID    lipase-protease-amylase 12,000-38,000-60,000 units  1 capsule Oral TID WM    magnesium oxide  400 mg Oral BID    metoprolol succinate  50 mg Oral Daily    rivaroxaban  20 mg Oral Daily with dinner    senna-docusate 8.6-50 mg  1 tablet Oral BID       PRN Medications: bisacodyl, influenza, lorazepam, magnesium hydroxide 400 mg/5 ml, ondansetron, pneumoc 13-marcus conj-dip cr(PF), promethazine, ramelteon    Review of Systems   Constitutional: Negative for chills, fatigue and fever.   HENT: Negative for trouble swallowing and voice change.    Eyes: Negative for photophobia and visual disturbance.   Respiratory: Negative for cough, shortness of breath and wheezing.    Cardiovascular: Negative for chest pain and palpitations.   Gastrointestinal: Negative for abdominal distention and nausea.   Genitourinary: Negative for difficulty urinating and flank pain.   Musculoskeletal: Positive for gait problem. Negative for arthralgias.   Skin: Negative for color change and rash.   Neurological: Positive for speech difficulty and weakness. Negative for facial asymmetry, numbness and headaches.   Psychiatric/Behavioral: Negative for agitation and confusion.     Objective:     Vital Signs (Most Recent):  Temp: 98.2 °F (36.8 °C)  (10/13/17 0700)  Pulse: 67 (10/13/17 0700)  Resp: 18 (10/13/17 0700)  BP: 134/65 (10/13/17 0700)  SpO2: 95 % (10/13/17 07)    Vital Signs (24h Range):  Temp:  [98 °F (36.7 °C)-98.2 °F (36.8 °C)] 98.2 °F (36.8 °C)  Pulse:  [67-70] 67  Resp:  [18-20] 18  SpO2:  [95 %] 95 %  BP: (134-135)/(65-69) 134/65     Physical Exam   Constitutional: She appears well-developed and well-nourished.   HENT:   Head: Normocephalic and atraumatic.   Eyes: EOM are normal. Pupils are equal, round, and reactive to light.   Neck: Normal range of motion.   Cardiovascular: Normal rate and regular rhythm.    Pulmonary/Chest: Effort normal. No respiratory distress.   Abdominal: Soft. There is no tenderness.   Musculoskeletal: Normal range of motion. She exhibits no deformity.   Neurological:   -  Mental Status:  AAOx3.  Follows commands.  Answers correct age and .  Recent and remote memory intact.  No neglect.    -  Speech and language:  - aphasia + dysarthria.    -  Coordination:  Finger to nose exam:  RUE mild dysmetria, LUE dysmetria.   -  Motor:  RUE: 4/5, 4/5 .  LUE: 5/5, 5/5 .  RLE: 5/5, DF 5/5, PF 5/5.  LLE: 5/5, DF 5/5, PF 5/5.   -  pronator drift. negative  -  Tone:  normal  -  Sensory:  Intact to light touch and pin prick.       Skin: Skin is warm and dry.   Psychiatric: She has a normal mood and affect. Her behavior is normal.   Vitals reviewed.    NEUROLOGICAL EXAMINATION:     CRANIAL NERVES     CN III, IV, VI   Pupils are equal, round, and reactive to light.  Extraocular motions are normal.       Assessment/Plan:      Naty St is a 72 y.o. female admitted to inpatient rehabilitation on 10/11/2017 for 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 with impaired mobility and ADLs. Patient remains appropriate for PT, OT, and as required Speech therapy. Patient continues to require 24 hour nursing care as well as daily Physician assessment.    * Cerebral infarction due to embolism of right  cerebellar artery    PT/OT/SLP    Transfers   Bed/Chair/WC 3   Toilet 3   Tub 1   Self Care   Eating 5   Grooming 5   Bathing 4   Dressing-Upper 4   Dressing-Lower 3   Toileting 3   Communication   Comprehension 4   Mode B   Expression 3   Mode B   Social Cognition   Social Interaction 4   Problem Solving 3   Memory 2             Ataxia    Likely 2/2 stroke. Continue working with PT/OT        CKD (chronic kidney disease) stage 3, GFR 30-59 ml/min    Currently stable. Will continue to monitor        Hypomagnesemia    1.7 today. Continue Mag Oxide 400mg BID. Continue to monitor        Essential hypertension    Controlled. Continue meds.            DISCHARGE PLANNING:  Tentative Discharge Date: 10/26/2017    Future Appointments  Date Time Provider Department Center   11/9/2017 9:30 AM [facility] PET CT LIMIT 500 LBS [facility]   11/9/2017 2:00 PM Pablo Lr MD [facility] STROKE Reece y   11/10/2017 9:30 AM LAB, HEMONC CANCER BLDG [facility] LAB HO Daniel Dillard   11/10/2017 10:30 AM Karrie Vazquez MD [facility] HEM ONC Daniel Dillard   11/10/2017 11:30 AM INJECTION, [facility] INFUSION N[facility] CHEMO Daniel Ponce MD  Department of Physical Medicine & Rehab   [facility]

## 2017-10-13 NOTE — NURSING
Pt arrived for xgeva.  Labs reviewed.  Pt tolerated injection SQ to right arm.  Pt recently had a stroke and is in rehab.  Pt discharged in wheelchair with .

## 2017-10-13 NOTE — PROGRESS NOTES
Physical Therapy   FIM scores    Naty St   MRN: [id number]            10/13/17 1600   Transfers   Bed/Chair/WC 4   Locomotion   Distance Walked 2   Distance Wheelchair 2   Walk 2   Wheelchair 2   Mode C   Stairs 4         Dianna Frankel, DYLON  10/13/2017        I certify that I was present in the room directing the student in service delivery and guided him/her using my skilled judgment. As the co-signing therapist, I have reviewed the student's documentation and am responsible for the treatment, assessment and plan.        Ruddy Sanchez, PT

## 2017-10-13 NOTE — PROGRESS NOTES
"Physical Therapy   Treatment    Naty St   MRN: [id number]        10/13/17 1600   PT Time Calculation   PT Start Time 1600   PT Stop Time 1700   PT Total Time (min) 60 min   Treatment   Treatment Type Treatment   PT/PTA PT   General   PT Received On 10/13/17   Missed Time Reason Out of hospital appointment  (pt at main campus and did not return until nearly 1600)   Family/Caregiver Present Yes  ()   Patient Found (position) Seated in wheelchair  (in room)   Pt found with (posey belt )   Precautions   General Precautions fall;aspiration   Visual/Auditory Vision impaired;Other (see comments)  (glasses)   Subjective   Patient states "Let's work"   Pain/Comfort   Pain Rating 1 no pain   Pain Rating Post-Intervention 1 no pain   Bed Mobility   Bed Mobility no   Transfers   Transfer yes   Sit to Stand   Sit <> Stand Assistance Contact Guard Assistance;Minimum Assistance   Sit <> Stand Assistive Device No Assistive Device   Trials/Comments multiple trials~ steadying during transition and cues for forward trunk lean and hand/foot placement   Stand to Sit   Assistance Minimum Assistance;Contact Guard Assistance   Assistive Device No Assistive Device   Trials/Comments multiple trials ~ cues for forward trunk lean and hand placement   Chair to Mat   Chair<> Mat Technique Stand Pivot   Chair<>Mat Assistance Minimum Assistance   Chair <> Mat Assistive Device No Assistive Device   Trials/Comments 1 trial; min A for steadying   Mat to Chair   Technique Stand Pivot   Assistance Minimum Assistance   Assistive Device No Assistive Device   Trials/Comments 1 trial ~ min A for steadying   Wheelchair Activities   Propulsion Yes   Propulsion Type 1 Manual   Level 1 Level tile   Method 1 Right upper extremity;Left upper extremity   Level of Assistance 1 Stand by assistsance   Description/ Details 1 pt propels w/c for ~100 ft x 2   (short rest break between trials)   Gait   Gait Yes   Weight Bearing Status full   Gait 1 "   Surface 1 Level tile   Gait Assistive Device Rolling walker   Other Apparatus 1 Other (Comment)  (gait belt)   Assistance 1 Minimum assistance   Gait Distance Pt ambulated 2x 108 ft w/ RW and gait belt donned. Pt experienced 2-3 losses of balance episodes but was able to recover w/ min A.  Pt required cues for managing and turning RW    Gait Pattern swing-through gait   Gait Deviation(s) increased time in double stance;decreased leila;decreased step length;decreased stride length;decreased velocity of limb motion;forward lean   Impairments Contributing to Gait Deviations impaired balance;impaired coordination;impaired motor control;impaired postural control;decreased strength   Stairs   Stairs Yes   Stairs/Curb Step   Rails 1 Bilateral   Device 1 No device   Assistance 1 Minimum assistance;Contact guard  (cues for hand placement, mainly when descending)   Comment/# Steps 1 pt ascends and descends 12 steps w/ gait belt donned   Balance   Balance Yes   Dynamic Sitting Balance   Dynamic Sitting-Balance Support Right upper extremity supported;Left upper extremity supported   Dynamic Sitting-Balance Ball toss;Reaching for objects;Reaching across midline   Dynamic Sitting-Comments Pt played ball toss w/ SPT for ~5 min w/ beach ball w/ CGA + gait belt; pt bounced and caught basketball w/ PT for ~2 min w/ CGA + gait belt; pt placed rings on ring tree w/ alternating hands for ~4 min w/ CGA + gait belt   Other Activities   Other Activities Other (Comment)   Comments Pt participated in #3 lower extremity progression of neuro-DAVON to work on hip/knee extension coordination against wall w/ min A/CGA + gait belt   Activity Tolerance   Activity Tolerance Patient tolerated treatment well   After Treatment   Patient Position After Treatment Other (comment)  ( brought wife back to room)   Assessment   Prognosis Good   Problem List Decreased strength;Decreased endurance;Impaired balance;Decreased mobility;Decreased  coordination;Decreased cognition;Impaired vision;Decreased safety awareness   Session Assessment progressing toward goals   Assessment Pt did well today, and was able to increase distances for both wheelchair propulsion and ambulation w/ RW.  Pt continues to require cueing for all sit<>stand transfers for hand placement and forward trunk lean.  Pt required assistance w/ all mobility and transfers secondary to ataxia and decreased postural control.  Pt  also expressed slight impulsivity but was able to be redirected.  Pt's potential for progress is good and should slowly improve with inpatient rehab.   Level of Motivation/Participation good   Barriers to Discharge None   Discharge Recommendations   Equipment Needed After Discharge bath bench;walker, rolling;wheelchair   Discharge Facility/Level Of Care Needs outpatient PT   Plan   Planned Therapy Intervention Continue with current plan;Bed mobility training;Transfer training;Gait training;Balance Training;Strengthening;Neuromuscular Re-education;Motor Coordination Training;Postural Re-education;Wheelchair Management/Propulsion   Therapy Frequency 2 times/day;daily;Monday-Friday;Saturday or Sunday   Physical Therapy Follow-up   PT Follow-up? Yes   PT - Next Visit Date 10/14/17   Treatment/Billable Minutes   Gait training 15   Therapeutic Activity 15   Neuromuscular Re-education 15   Train/Wheelchair Management 15   Total Time 60       Dianna Frankel, SPT  10/13/2017        I certify that I was present in the room directing the student in service delivery and guided him/her using my skilled judgment. As the co-signing therapist, I have reviewed the student's documentation and am responsible for the treatment, assessment and plan.        Ruddy Sanchez, PT

## 2017-10-13 NOTE — PROGRESS NOTES
Patient had smear amount of blood on toilet paper due to constipation. Dr. Ponce noticed no new orders. Explain to patient she had medication she could take for constipation but patient wanted to wait till night time to take. Patient explained she was going to doctor appt and wanted to wait.

## 2017-10-13 NOTE — PROGRESS NOTES
Physical Therapy   Missed Treatment Minutes    Naty St   MRN: [id number]             ___Mrs. St_____ missed  ____45__ minutes of ST and OT and 30 minutes of___physical____ therapy today due to   ____late transport back to [facility] after appointment____. Attempted to treat the pt  __2____ times for OT and ST, including bedside   treatment, but pt still ___had not returned__PT able to see pt for 60 of her 90 minutes scheduled_.Will attempt to make up this missed time as soon as possible.   Treating physician has been made aware as well as patient's nurse __Pam._______.         Ruddy Sanchez, PT 10/13/2017

## 2017-10-13 NOTE — PROGRESS NOTES
NURSE TECH FIM  REPORT    EATING  [x] Pt. opens packages, cuts food, pours liquids, and feeds self.  Regular consistency (7)  [] Pt. needs dentures, swallow technique, special foods or drink consistency (6)  [] Pt. needs supervision (cueing), set up (open containers, cut food, etc. (5)  [] Pt. needs assist with occasional scooping or checking for food pocketing (75-99%) (4)  [] Pt. needs assist to load each bite on utensils, pt.brings food to mouth (50-74%) (3)  [] Pt. needs assist to scoop, bring food to mouth (hand over hand) (25-49%) (2)  [] Pt. Is receiving IV fluids for hydration or tube feedings  (0-24%) (1)      GROOMING   [] Pt. performs all tasks independently and safely (7)  [] Pt. obtains all articles.  Needs adaptive/assistive device (such as wash mitt) (6)  [x] Pt. needs supervision (cueing), set up (helper obtains articles, applies toothpaste, plugs razor, hands items to patient, opens containers, adjusts water temperature) (5)       [] Pt. doing 3 out of 4,  or 4 out of 5 tasks.  Needs assist to put in or remove dentures.  Needs steadying assist.(Pt. Doing 75-99%. (4)                                                                 [] Pt. doing 2 out of 4, or 3 out of 5 tasks (50-74%) (3)  [] Pt. doing 1 out of 4, or 2 out of 5 tasks (25-49%) (2)  [] Pt. doing 0 out of 4, or 1 out of 5 tasks or needs assistance of 2 helpers (0-24%) (1)  [] Activity done with OT      BATHING  [] Pt. safely bathes whole body (10 parts of 10) (7)  [] Pt. doing 10 out of 10 body parts.  Uses adaptive devices (such as wash mitt, long handled sponge, etc.) (6)  []  Pt. doing 8-9 out of 10 body parts , or pt. needs steadying assistance. (75-99%) (4)  []  Pt. doing 10 out of 10 body parts buts needs supervision or set up (5)  [x] Pt. doing 5-7 out of 10 body parts (50-74%) (3)  [] Pt. doing 3-4 of out 10 body parts (25-49%) (2)  [] Pt. doing 0-2 out of 10 body parts or needs assist of two helpers. (0-24%) (1)    [] Activity  done with OT      DRESSING UPPER BODY  [] Pt. dresses independently and undresses independently, obtaining clothes from          storage area (7)  [] Pt. needs adaptive devices (such as Velcro fastener, reacher, button hook, etc.)          Applies abdominal binder or any orthotic.  Uses  walker for steadying. (6)  [] Pt. needs supervision (cueing), set up (helper brings clothes or applies orthotics (such as abdominal binder, TLSO, cervical collar) (5)  [] Pt. doing 75-99%. (4)  [x] Pt. doing 50-74%. (3)  [] Pt. doing 25-49%. (2)  [] Pt. doing 0-24%, or needs assistance of 2 helpers. (1)  [] Activity done with OT.      DRESSING LOWER BODY  [] Pt. independent with all parts of dressing lower body. (7)  [] Pt. needs adaptive devices ( such as reacher, long handled shoe horn, sock aid) (6)  [] Pt. needs supervision (cueing), set up(helper brings clothes or applies orthotic, such   as TALON hose, AFO) (5)  [] Pt. doing 75-99%.  Needs steadying assistance: buttoning pants, zipping a zipper,        fastening a belt, tying shoelaces, applying one sock/shoe. (4)  [] Pt. doing 50-74%. (3)  [x] Pt. doing 25-49%. (2)  [] Pt. Doing 0-24%, or needs assistance of 2 helpers. (1)  [] Activity done with OT.      TOILETING  [] Pt. doing 3 out of 3 tasks independently (pulling down clothes, cleansing elizabeth area,  pulling up clothes) (7)  [x] Pt. doing 3 out of 3 tasks, but needs assistive device (grab bars, etc.) (6)  [] Pt. needs supervision (cueing), or set up (obtaining supplies for elizabeth care.) (5)  [] Pt. doing 3 out of 3 tasks, but needs steadying assistance with one or more parts. (75-90%) (4)  [] Pt. doing 2 out of 3 tasks (50-74%) (3)  [] Pt. doing out of 3 tasks (25-49%) (2)  [] Pt. needs assist (more than steadying) with all 3 tasks.  Needs assist of 2 helpers.    Incontinent of B/B today. (0-24%) (1)  [] Activity did not occur.      BLADDER  [x] Pt.  uses toilet (7)  [] Pt. is on dialysis - does not urinate (7)  [] Pt. uses  bedside commode (5)  [] Pt. uses bedpan - staff does ____% of tasks(includes rolling on/off, holding bedpan in place, hygiene, and emptying pan)   Pt. uses urinal - staff empties (5)                     []  Pt. needs help with positioning the urinal (4)                     []  Pt. needs help holding the urinal (1)  [] Pt. has lazo catheter/suprapubic catheter/concom cath - staff empties (1)  [] Pt. is on ICP:                     []  Pt. does catheterization (6)                     []  Staff does catheterization (1)  [] Pt. is incontinent - staff does ____% of tasks (includes clothes management,  hygiene, and changing diaper)  [] Number of accidents during this shift ____       BOWEL  [x] Pt. uses toilet (7)  [] Pt. uses bedside commode (5)  [] Pt. uses bedpan - staff does ____% of tasks (includes rolling on/off, holding bedpan                                                          In place, hygiene, and emptying pan)  [] Pt. on bowel program:                    [] Pt.inserts suppository (6)                    [] Nurse inserts suppository (4)                    []  Nurse does dig. stim (1)  [] Pt. has colostomy - staff maintains care and empties (1)                    [] Pt. does ____% of care for colostomy  [] Pt. is incontinent - staff does ____% of tasks (includes clothes management,  hygiene, and changing diaper)  [] Number of accidents during this shift____  [] No bowel movement this shift      TRANSFERS:  BED/CHAIR/WHEELCHAIR  [] Pt. transfers without assistive device into and out of wheelchair/bed/chair (7)  [] Pt. uses assistive/adaptive devices (grab bars, sliding board, walker, bed rails,  wheelchair armrests, etc.) (6)  [x] Pt. needs supervision, cues, or head of bed raised by staff (5)  [] Pt. needs steadying assist with any or all parts of transfer, or needs assist with one  leg during transfer (4)  [] Pt. needs lifting or lowering, or needs assist with 2 legs during transfer (3)  [] Pt. needs lifting  and lowering during transfer (2)  [] Pt. needs assist of 2 helpers or mechanical lift (1)  [] Activity did not occur   ________________________________________________________________  Includes lying to seated position at edge of bed.  Check assist level needed:  [] Used bedrails              []  Supervision                   [x]   Min. A  [] Mod A                          []  Max A                            []  Total A    If in wheelchair:  Check assist level needed:  [] Lock brakes                 []  Lifts/lowers footrests       []  Removes w/c arm                                                                                (for slide board transfers)    TRANSFER:  TOILET/BEDSIDE COMMODE   [] Pt. transfers from wheelchair or walking without assistive device to toilet.  Gets on and off a standard toilet. (7)  [] Pt. uses assistive/adaptive device (commode, grab bars, sliding board, walker prosthesis, orthotic, raised toilet seat) (6)  [x] Pt. needs supervision, cues, or set up (needs assist to lock brakes, remove leg or arm rest, position sliding board) (5)  [] Pt. needs steadying assist with any or all parts of transfer or needs assist with one leg during transfer. (4)  [] Pt. needs lifting or lowering or needs assist with two legs during transfer. (3)  [] Pt. needs lifting and lowering during transfer. (2)  [] Pt. needs assist of 2 helpers or mechanical lift. (1)  [] Activity did not occur.      TRANSFER:  SHOWER  [] Pt. transfers without assistive device into and out of shower. (7)  [] Pt. uses assistive/adaptive device (shower chair/bench, grab bars, sliding board,   walker, prothesis, orthotic, raised toilet seat. (6)  [] Pt. needs supervision, cues, or set up (needs assist to lock brakes, remove leg or armrest, position sliding board) (5)  [] Pt. needs steadying assist with any or all parts of transfer or needs assist with one      leg during transfer. (4)  [] Pt. needs lifting or lowering or needs assist  with two legs during transfer. (3)  [] Pt. needs lifting and lowering during transfer. (2)  [] Pt. needs assist of 2 helpers.  Willard pushes shower chair on wheels in and out of   shower. (1)  [x] Activity did not occur.                                                                                                                                                                                 r                                                                                                                    pT.

## 2017-10-13 NOTE — SUBJECTIVE & OBJECTIVE
Interval History: Pt without new c/o's.  I have reviewed the HPI and PMFSH and there are no changes from admission.       Scheduled Medications:    amitriptyline  50 mg Oral QHS    amlodipine  5 mg Oral Daily    atorvastatin  40 mg Oral Daily    dronabinol  5 mg Oral BID AC    levetiracetam  750 mg Oral BID    lipase-protease-amylase 12,000-38,000-60,000 units  1 capsule Oral TID WM    magnesium oxide  400 mg Oral BID    metoprolol succinate  50 mg Oral Daily    rivaroxaban  20 mg Oral Daily with dinner    senna-docusate 8.6-50 mg  1 tablet Oral BID       PRN Medications: bisacodyl, influenza, lorazepam, magnesium hydroxide 400 mg/5 ml, ondansetron, pneumoc 13-marcus conj-dip cr(PF), promethazine, ramelteon    Review of Systems   Constitutional: Negative for chills, fatigue and fever.   HENT: Negative for trouble swallowing and voice change.    Eyes: Negative for photophobia and visual disturbance.   Respiratory: Negative for cough, shortness of breath and wheezing.    Cardiovascular: Negative for chest pain and palpitations.   Gastrointestinal: Negative for abdominal distention and nausea.   Genitourinary: Negative for difficulty urinating and flank pain.   Musculoskeletal: Positive for gait problem. Negative for arthralgias.   Skin: Negative for color change and rash.   Neurological: Positive for speech difficulty and weakness. Negative for facial asymmetry, numbness and headaches.   Psychiatric/Behavioral: Negative for agitation and confusion.     Objective:     Vital Signs (Most Recent):  Temp: 98.2 °F (36.8 °C) (10/13/17 0700)  Pulse: 67 (10/13/17 0700)  Resp: 18 (10/13/17 0700)  BP: 134/65 (10/13/17 0700)  SpO2: 95 % (10/13/17 0700)    Vital Signs (24h Range):  Temp:  [98 °F (36.7 °C)-98.2 °F (36.8 °C)] 98.2 °F (36.8 °C)  Pulse:  [67-70] 67  Resp:  [18-20] 18  SpO2:  [95 %] 95 %  BP: (134-135)/(65-69) 134/65     Physical Exam   Constitutional: She appears well-developed and well-nourished.   HENT:    Head: Normocephalic and atraumatic.   Eyes: EOM are normal. Pupils are equal, round, and reactive to light.   Neck: Normal range of motion.   Cardiovascular: Normal rate and regular rhythm.    Pulmonary/Chest: Effort normal. No respiratory distress.   Abdominal: Soft. There is no tenderness.   Musculoskeletal: Normal range of motion. She exhibits no deformity.   Neurological:   -  Mental Status:  AAOx3.  Follows commands.  Answers correct age and .  Recent and remote memory intact.  No neglect.    -  Speech and language:  - aphasia + dysarthria.    -  Coordination:  Finger to nose exam:  RUE mild dysmetria, LUE dysmetria.   -  Motor:  RUE: 4/5, 4/5 .  LUE: 5/5, 5/5 .  RLE: 5/5, DF 5/5, PF 5/5.  LLE: 5/5, DF 5/5, PF 5/5.   -  pronator drift. negative  -  Tone:  normal  -  Sensory:  Intact to light touch and pin prick.       Skin: Skin is warm and dry.   Psychiatric: She has a normal mood and affect. Her behavior is normal.   Vitals reviewed.    NEUROLOGICAL EXAMINATION:     CRANIAL NERVES     CN III, IV, VI   Pupils are equal, round, and reactive to light.  Extraocular motions are normal.

## 2017-10-13 NOTE — PROGRESS NOTES
NURSE TECH FIM  REPORT    EATING  [] Pt. opens packages, cuts food, pours liquids, and feeds self.  Regular consistency (7)  [] Pt. needs dentures, swallow technique, special foods or drink consistency (6)  [] Pt. needs supervision (cueing), set up (open containers, cut food, etc. (5)  [] Pt. needs assist with occasional scooping or checking for food pocketing (75-99%) (4)  [] Pt. needs assist to load each bite on utensils, pt.brings food to mouth (50-74%) (3)  [] Pt. needs assist to scoop, bring food to mouth (hand over hand) (25-49%) (2)  [] Pt. Is receiving IV fluids for hydration or tube feedings  (0-24%) (1)      GROOMING   [] Pt. performs all tasks independently and safely (7)  [] Pt. obtains all articles.  Needs adaptive/assistive device (such as wash mitt) (6)  [] Pt. needs supervision (cueing), set up (helper obtains articles, applies toothpaste, plugs razor, hands items to patient, opens containers, adjusts water temperature) (5)       [] Pt. doing 3 out of 4,  or 4 out of 5 tasks.  Needs assist to put in or remove dentures.  Needs steadying assist.(Pt. Doing 75-99%. (4)                                                                 [] Pt. doing 2 out of 4, or 3 out of 5 tasks (50-74%) (3)  [] Pt. doing 1 out of 4, or 2 out of 5 tasks (25-49%) (2)  [] Pt. doing 0 out of 4, or 1 out of 5 tasks or needs assistance of 2 helpers (0-24%) (1)  [] Activity done with OT      BATHING  [] Pt. safely bathes whole body (10 parts of 10) (7)  [] Pt. doing 10 out of 10 body parts.  Uses adaptive devices (such as wash mitt, long handled sponge, etc.) (6)  []  Pt. doing 8-9 out of 10 body parts , or pt. needs steadying assistance. (75-99%) (4)  []  Pt. doing 10 out of 10 body parts buts needs supervision or set up (5)  [] Pt. doing 5-7 out of 10 body parts (50-74%) (3)  [] Pt. doing 3-4 of out 10 body parts (25-49%) (2)  [] Pt. doing 0-2 out of 10 body parts or needs assist of two helpers. (0-24%) (1)    [] Activity done  with OT      DRESSING UPPER BODY  [] Pt. dresses independently and undresses independently, obtaining clothes from          storage area (7)  [] Pt. needs adaptive devices (such as Velcro fastener, reacher, button hook, etc.)          Applies abdominal binder or any orthotic.  Uses  walker for steadying. (6)  [] Pt. needs supervision (cueing), set up (helper brings clothes or applies orthotics (such as abdominal binder, TLSO, cervical collar) (5)  [] Pt. doing 75-99%. (4)  [] Pt. doing 50-74%. (3)  [] Pt. doing 25-49%. (2)  [] Pt. doing 0-24%, or needs assistance of 2 helpers. (1)  [] Activity done with OT.      DRESSING LOWER BODY  [] Pt. independent with all parts of dressing lower body. (7)  [] Pt. needs adaptive devices ( such as reacher, long handled shoe horn, sock aid) (6)  [] Pt. needs supervision (cueing), set up(helper brings clothes or applies orthotic, such   as TALON hose, AFO) (5)  [] Pt. doing 75-99%.  Needs steadying assistance: buttoning pants, zipping a zipper,        fastening a belt, tying shoelaces, applying one sock/shoe. (4)  [] Pt. doing 50-74%. (3)  [] Pt. doing 25-49%. (2)  [] Pt. Doing 0-24%, or needs assistance of 2 helpers. (1)  [] Activity done with OT.      TOILETING  [] Pt. doing 3 out of 3 tasks independently (pulling down clothes, cleansing elizabeth area,  pulling up clothes) (7)  [] Pt. doing 3 out of 3 tasks, but needs assistive device (grab bars, etc.) (6)  [] Pt. needs supervision (cueing), or set up (obtaining supplies for elizabeth care.) (5)  [] Pt. doing 3 out of 3 tasks, but needs steadying assistance with one or more parts. (75-90%) (4)  [] Pt. doing 2 out of 3 tasks (50-74%) (3)  [] Pt. doing out of 3 tasks (25-49%) (2)  [] Pt. needs assist (more than steadying) with all 3 tasks.  Needs assist of 2 helpers.    Incontinent of B/B today. (0-24%) (1)  [] Activity did not occur.      BLADDER  [] Pt.  uses toilet (7)  [] Pt. is on dialysis - does not urinate (7)  [] Pt. uses bedside  commode (5)  [] Pt. uses bedpan - staff does ____% of tasks(includes rolling on/off, holding bedpan in place, hygiene, and emptying pan)   Pt. uses urinal - staff empties (5)                     []  Pt. needs help with positioning the urinal (4)                     []  Pt. needs help holding the urinal (1)  [] Pt. has lazo catheter/suprapubic catheter/concom cath - staff empties (1)  [] Pt. is on ICP:                     []  Pt. does catheterization (6)                     []  Staff does catheterization (1)  [] Pt. is incontinent - staff does ____% of tasks (includes clothes management,  hygiene, and changing diaper)  [] Number of accidents during this shift ____       BOWEL  [] Pt. uses toilet (7)  [] Pt. uses bedside commode (5)  [] Pt. uses bedpan - staff does ____% of tasks (includes rolling on/off, holding bedpan                                                          In place, hygiene, and emptying pan)  [] Pt. on bowel program:                    [] Pt.inserts suppository (6)                    [] Nurse inserts suppository (4)                    []  Nurse does dig. stim (1)  [] Pt. has colostomy - staff maintains care and empties (1)                    [] Pt. does ____% of care for colostomy  [] Pt. is incontinent - staff does ____% of tasks (includes clothes management,  hygiene, and changing diaper)  [] Number of accidents during this shift____  [] No bowel movement this shift      TRANSFERS:  BED/CHAIR/WHEELCHAIR  [] Pt. transfers without assistive device into and out of wheelchair/bed/chair (7)  [] Pt. uses assistive/adaptive devices (grab bars, sliding board, walker, bed rails,  wheelchair armrests, etc.) (6)  [] Pt. needs supervision, cues, or head of bed raised by staff (5)  [] Pt. needs steadying assist with any or all parts of transfer, or needs assist with one  leg during transfer (4)  [] Pt. needs lifting or lowering, or needs assist with 2 legs during transfer (3)  [] Pt. needs lifting and  lowering during transfer (2)  [] Pt. needs assist of 2 helpers or mechanical lift (1)  [] Activity did not occur   ________________________________________________________________  Includes lying to seated position at edge of bed.  Check assist level needed:  [] Used bedrails              []  Supervision                   []   Min. A  [] Mod A                          []  Max A                            []  Total A    If in wheelchair:  Check assist level needed:  [] Lock brakes                 []  Lifts/lowers footrests       []  Removes w/c arm                                                                                (for slide board transfers)    TRANSFER:  TOILET/BEDSIDE COMMODE   [] Pt. transfers from wheelchair or walking without assistive device to toilet.  Gets on and off a standard toilet. (7)  [] Pt. uses assistive/adaptive device (commode, grab bars, sliding board, walker prosthesis, orthotic, raised toilet seat) (6)  [] Pt. needs supervision, cues, or set up (needs assist to lock brakes, remove leg or arm rest, position sliding board) (5)  [] Pt. needs steadying assist with any or all parts of transfer or needs assist with one leg during transfer. (4)  [] Pt. needs lifting or lowering or needs assist with two legs during transfer. (3)  [] Pt. needs lifting and lowering during transfer. (2)  [] Pt. needs assist of 2 helpers or mechanical lift. (1)  [] Activity did not occur.      TRANSFER:  SHOWER  [] Pt. transfers without assistive device into and out of shower. (7)  [] Pt. uses assistive/adaptive device (shower chair/bench, grab bars, sliding board,   walker, prothesis, orthotic, raised toilet seat. (6)  [] Pt. needs supervision, cues, or set up (needs assist to lock brakes, remove leg or armrest, position sliding board) (5)  [] Pt. needs steadying assist with any or all parts of transfer or needs assist with one      leg during transfer. (4)  [] Pt. needs lifting or lowering or needs assist with  two legs during transfer. (3)  [] Pt. needs lifting and lowering during transfer. (2)  [] Pt. needs assist of 2 helpers.  [place] pushes shower chair on wheels in and out of   shower. (1)  [] Activity did not occur.                                                                                                                                                                                 r                                                                                                                    pT.                                  NURSE TECH FIM  REPORT    EATING  [x] Pt. opens packages, cuts food, pours liquids, and feeds self.  Regular consistency (7)  [] Pt. needs dentures, swallow technique, special foods or drink consistency (6)  [] Pt. needs supervision (cueing), set up (open containers, cut food, etc. (5)  [] Pt. needs assist with occasional scooping or checking for food pocketing (75-99%) (4)  [] Pt. needs assist to load each bite on utensils, pt.brings food to mouth (50-74%) (3)  [] Pt. needs assist to scoop, bring food to mouth (hand over hand) (25-49%) (2)  [] Pt. Is receiving IV fluids for hydration or tube feedings  (0-24%) (1)      GROOMING   [] Pt. performs all tasks independently and safely (7)  [] Pt. obtains all articles.  Needs adaptive/assistive device (such as wash mitt) (6)  [] Pt. needs supervision (cueing), set up (helper obtains articles, applies toothpaste, plugs razor, hands items to patient, opens containers, adjusts water temperature) (5)       [] Pt. doing 3 out of 4,  or 4 out of 5 tasks.  Needs assist to put in or remove dentures.  Needs steadying assist.(Pt. Doing 75-99%. (4)                                                                 [x] Pt. doing 2 out of 4, or 3 out of 5 tasks (50-74%) (3)  [] Pt. doing 1 out of 4, or 2 out of 5 tasks (25-49%) (2)  [] Pt. doing 0 out of 4, or 1 out of 5 tasks or needs assistance of 2 helpers (0-24%) (1)  [] Activity done with  OT      BATHING  [] Pt. safely bathes whole body (10 parts of 10) (7)  [] Pt. doing 10 out of 10 body parts.  Uses adaptive devices (such as wash mitt, long handled sponge, etc.) (6)  []  Pt. doing 8-9 out of 10 body parts , or pt. needs steadying assistance. (75-99%) (4)  [x]  Pt. doing 10 out of 10 body parts buts needs supervision or set up (5)  [] Pt. doing 5-7 out of 10 body parts (50-74%) (3)  [] Pt. doing 3-4 of out 10 body parts (25-49%) (2)  [] Pt. doing 0-2 out of 10 body parts or needs assist of two helpers. (0-24%) (1)    [] Activity done with OT      DRESSING UPPER BODY  [] Pt. dresses independently and undresses independently, obtaining clothes from          storage area (7)  [] Pt. needs adaptive devices (such as Velcro fastener, reacher, button hook, etc.)          Applies abdominal binder or any orthotic.  Uses  walker for steadying. (6)  [] Pt. needs supervision (cueing), set up (helper brings clothes or applies orthotics (such as abdominal binder, TLSO, cervical collar) (5)  [] Pt. doing 75-99%. (4)  [x] Pt. doing 50-74%. (3)  [] Pt. doing 25-49%. (2)  [] Pt. doing 0-24%, or needs assistance of 2 helpers. (1)  [] Activity done with OT.      DRESSING LOWER BODY  [] Pt. independent with all parts of dressing lower body. (7)  [] Pt. needs adaptive devices ( such as reacher, long handled shoe horn, sock aid) (6)  [] Pt. needs supervision (cueing), set up(helper brings clothes or applies orthotic, such   as TALON hose, AFO) (5)  [] Pt. doing 75-99%.  Needs steadying assistance: buttoning pants, zipping a zipper,        fastening a belt, tying shoelaces, applying one sock/shoe. (4)  [x] Pt. doing 50-74%. (3)  [] Pt. doing 25-49%. (2)  [] Pt. Doing 0-24%, or needs assistance of 2 helpers. (1)  [] Activity done with OT.      TOILETING  [] Pt. doing 3 out of 3 tasks independently (pulling down clothes, cleansing elizabeth area,  pulling up clothes) (7)  [] Pt. doing 3 out of 3 tasks, but needs assistive device  (grab bars, etc.) (6)  [] Pt. needs supervision (cueing), or set up (obtaining supplies for elizabeth care.) (5)  [x] Pt. doing 3 out of 3 tasks, but needs steadying assistance with one or more parts. (75-90%) (4)  [] Pt. doing 2 out of 3 tasks (50-74%) (3)  [] Pt. doing out of 3 tasks (25-49%) (2)  [] Pt. needs assist (more than steadying) with all 3 tasks.  Needs assist of 2 helpers.    Incontinent of B/B today. (0-24%) (1)  [] Activity did not occur.      BLADDER  [] Pt.  uses toilet (7)  [] Pt. is on dialysis - does not urinate (7)  [x] Pt. uses bedside commode (5)  [] Pt. uses bedpan - staff does ____% of tasks(includes rolling on/off, holding bedpan in place, hygiene, and emptying pan)   Pt. uses urinal - staff empties (5)                     []  Pt. needs help with positioning the urinal (4)                     []  Pt. needs help holding the urinal (1)  [] Pt. has lazo catheter/suprapubic catheter/concom cath - staff empties (1)  [] Pt. is on ICP:                     []  Pt. does catheterization (6)                     []  Staff does catheterization (1)  [] Pt. is incontinent - staff does ____% of tasks (includes clothes management,  hygiene, and changing diaper)  [] Number of accidents during this shift ____       BOWEL  [] Pt. uses toilet (7)  [x] Pt. uses bedside commode (5)  [] Pt. uses bedpan - staff does ____% of tasks (includes rolling on/off, holding bedpan                                                          In place, hygiene, and emptying pan)  [] Pt. on bowel program:                    [] Pt.inserts suppository (6)                    [] Nurse inserts suppository (4)                    []  Nurse does dig. stim (1)  [] Pt. has colostomy - staff maintains care and empties (1)                    [] Pt. does ____% of care for colostomy  [] Pt. is incontinent - staff does ____% of tasks (includes clothes management,  hygiene, and changing diaper)  [] Number of accidents during this shift____  [] No  bowel movement this shift      TRANSFERS:  BED/CHAIR/WHEELCHAIR  [] Pt. transfers without assistive device into and out of wheelchair/bed/chair (7)  [x] Pt. uses assistive/adaptive devices (grab bars, sliding board, walker, bed rails,  wheelchair armrests, etc.) (6)  [] Pt. needs supervision, cues, or head of bed raised by staff (5)  [] Pt. needs steadying assist with any or all parts of transfer, or needs assist with one  leg during transfer (4)  [] Pt. needs lifting or lowering, or needs assist with 2 legs during transfer (3)  [x] Pt. needs lifting and lowering during transfer (2)  [] Pt. needs assist of 2 helpers or mechanical lift (1)  [] Activity did not occur   ________________________________________________________________  Includes lying to seated position at edge of bed.  Check assist level needed:  [] Used bedrails              []  Supervision                   []   Min. A  [] Mod A                          []  Max A                            []  Total A    If in wheelchair:  Check assist level needed:  [] Lock brakes                 []  Lifts/lowers footrests       []  Removes w/c arm                                                                                (for slide board transfers)    TRANSFER:  TOILET/BEDSIDE COMMODE   [] Pt. transfers from wheelchair or walking without assistive device to toilet.  Gets on and off a standard toilet. (7)  [x] Pt. uses assistive/adaptive device (commode, grab bars, sliding board, walker prosthesis, orthotic, raised toilet seat) (6)  [] Pt. needs supervision, cues, or set up (needs assist to lock brakes, remove leg or arm rest, position sliding board) (5)  [] Pt. needs steadying assist with any or all parts of transfer or needs assist with one leg during transfer. (4)  [] Pt. needs lifting or lowering or needs assist with two legs during transfer. (3)  [] Pt. needs lifting and lowering during transfer. (2)  [] Pt. needs assist of 2 helpers or mechanical lift.  (1)  [] Activity did not occur.      TRANSFER:  SHOWER  [] Pt. transfers without assistive device into and out of shower. (7)  [] Pt. uses assistive/adaptive device (shower chair/bench, grab bars, sliding board,   walker, prothesis, orthotic, raised toilet seat. (6)  [] Pt. needs supervision, cues, or set up (needs assist to lock brakes, remove leg or armrest, position sliding board) (5)  [] Pt. needs steadying assist with any or all parts of transfer or needs assist with one      leg during transfer. (4)  [] Pt. needs lifting or lowering or needs assist with two legs during transfer. (3)  [] Pt. needs lifting and lowering during transfer. (2)  [] Pt. needs assist of 2 helpers.  [place] pushes shower chair on wheels in and out of   shower. (1)  [x] Activity did not occur.                                                                                                                                                                                 r                                                                                                                    pT.

## 2017-10-13 NOTE — PROGRESS NOTES
Called Dr. Ponce about patient just getting back from doctor and eating lunch. Explained that the patient had Creon that was due at 1200 but also due at 1715. Ask Dr. Ponce if he wanted the patient to be administered the 1200 dose. Dr. Ponce ordered to not administer the 1200 dose.

## 2017-10-14 PROCEDURE — 25000003 PHARM REV CODE 250: Performed by: PHYSICAL MEDICINE & REHABILITATION

## 2017-10-14 PROCEDURE — 97110 THERAPEUTIC EXERCISES: CPT

## 2017-10-14 PROCEDURE — 92507 TX SP LANG VOICE COMM INDIV: CPT

## 2017-10-14 PROCEDURE — 97535 SELF CARE MNGMENT TRAINING: CPT

## 2017-10-14 PROCEDURE — 12800000 HC REHAB SEMI-PRIVATE ROOM

## 2017-10-14 PROCEDURE — 63600175 PHARM REV CODE 636 W HCPCS: Performed by: PHYSICAL MEDICINE & REHABILITATION

## 2017-10-14 PROCEDURE — 97542 WHEELCHAIR MNGMENT TRAINING: CPT

## 2017-10-14 PROCEDURE — 97116 GAIT TRAINING THERAPY: CPT

## 2017-10-14 PROCEDURE — 97150 GROUP THERAPEUTIC PROCEDURES: CPT

## 2017-10-14 PROCEDURE — 99232 SBSQ HOSP IP/OBS MODERATE 35: CPT | Mod: ,,, | Performed by: PHYSICAL MEDICINE & REHABILITATION

## 2017-10-14 PROCEDURE — 25000003 PHARM REV CODE 250: Performed by: STUDENT IN AN ORGANIZED HEALTH CARE EDUCATION/TRAINING PROGRAM

## 2017-10-14 PROCEDURE — 97112 NEUROMUSCULAR REEDUCATION: CPT

## 2017-10-14 RX ORDER — ERLOTINIB HYDROCHLORIDE 150 MG/1
150 TABLET, FILM COATED ORAL NIGHTLY
Status: DISCONTINUED | OUTPATIENT
Start: 2017-10-14 | End: 2017-10-26 | Stop reason: HOSPADM

## 2017-10-14 RX ADMIN — Medication 400 MG: at 08:10

## 2017-10-14 RX ADMIN — DRONABINOL 5 MG: 2.5 CAPSULE ORAL at 05:10

## 2017-10-14 RX ADMIN — PANCRELIPASE 1 CAPSULE: 60000; 12000; 38000 CAPSULE, DELAYED RELEASE PELLETS ORAL at 12:10

## 2017-10-14 RX ADMIN — PANCRELIPASE 1 CAPSULE: 60000; 12000; 38000 CAPSULE, DELAYED RELEASE PELLETS ORAL at 08:10

## 2017-10-14 RX ADMIN — LEVETIRACETAM 750 MG: 750 TABLET ORAL at 08:10

## 2017-10-14 RX ADMIN — PANCRELIPASE 1 CAPSULE: 60000; 12000; 38000 CAPSULE, DELAYED RELEASE PELLETS ORAL at 05:10

## 2017-10-14 RX ADMIN — ATORVASTATIN CALCIUM 40 MG: 20 TABLET, FILM COATED ORAL at 08:10

## 2017-10-14 RX ADMIN — AMITRIPTYLINE HYDROCHLORIDE 50 MG: 25 TABLET, FILM COATED ORAL at 08:10

## 2017-10-14 RX ADMIN — METOPROLOL SUCCINATE 50 MG: 50 TABLET, EXTENDED RELEASE ORAL at 08:10

## 2017-10-14 RX ADMIN — DRONABINOL 5 MG: 2.5 CAPSULE ORAL at 04:10

## 2017-10-14 RX ADMIN — AMLODIPINE BESYLATE 5 MG: 5 TABLET ORAL at 08:10

## 2017-10-14 RX ADMIN — RIVAROXABAN 20 MG: 20 TABLET, FILM COATED ORAL at 05:10

## 2017-10-14 RX ADMIN — ERLOTINIB HYDROCHLORIDE 150 MG: 150 TABLET, FILM COATED ORAL at 09:10

## 2017-10-14 NOTE — PROGRESS NOTES
Physical Therapy  Weekend Treatment  [address]   MRN: [id number]   Room/Bed: E278/E278 A   10/14/17 1100   PT Time Calculation   PT Start Time 0815   PT Stop Time 0945   PT Total Time (min) 90 min   Treatment   Treatment Type Treatment   PT/PTA PTA   PTA Visit Number 1   General   PT Received On 10/14/17   Family/Caregiver Present No   Patient Found (position) Seated at edge of bed  (eating breaksast dons shoes with MIN A )   Precautions   General Precautions fall;vision impaired  (wears glasses)   Visual/Auditory Other (see comments)  (glassses worn durng PT tx )   Subjective   Patient states (Initially refused, post discussion agrees to PT )   Pain/Comfort   Pain Rating 1 no pain   Transfers   Transfer yes   Sit to Stand   Sit <> Stand Assistance Minimum Assistance   Sit <> Stand Assistive Device No Assistive Device   Trials/Comments ( 1 x from bed to w/c )   Stand to Sit   Assistance Contact Guard Assistance;Minimum Assistance  (with verbal cues for proper technique )   Assistive Device Rolling Walker   Trials/Comments (x 4 prior to ambulation trials )   Chair to Mat   Chair<> Mat Technique Stand Pivot   Chair<>Mat Assistance Contact Guard Assistance   Chair <> Mat Assistive Device No Assistive Device   Trials/Comments (with verbal cues for technique )   Mat to Chair   Technique Stand Pivot   Assistance Contact Guard Assistance   Assistive Device No Assistive Device   Trials/Comments (with verbal cues for improved technique / safety )   Wheelchair Activities   Pressure Relief No   Wheelchair Parts Management No   Propulsion Yes   Propulsion Type 1 Manual   Level 1 Level tile   Method 1 Right upper extremity;Left upper extremity   Level of Assistance 1 Contact guard;Minimum assistance  (hand over hand LUE and assistance with turns)   Gait   Gait Yes   Weight Bearing Status (Full weightberaing )   Gait 1   Surface 1 Level tile   Gait Assistive Device Rolling walker   Assistance 1 Contact Guard  Assistance;Minimum assistance  (MIN A decreasing to close CGA and verbal cues )   Gait Distance (120 feet x 4 )   Gait Pattern swing-to gait   Gait Deviation(s) decreased leila;increased time in double stance;decreased step length;decreased weight-shifting ability;forward lean, tendency toward narrow MERON    Impairments Contributing to Gait Deviations impaired balance;impaired coordination;impaired motor control   Stairs   Stairs Yes   Stairs/Curb Step   Rails 1 Bilateral   Device 1 No device   Assistance 1 Minimum assistance   Comment/# Steps 1 (4 steps x 3 (12 total) with seated breaks between 3 bouts )   Balance   Balance Yes   Static Sitting Balance   Static Sitting-Balance Support No upper extremity supported;Feet supported   Static Sitting-Level of Assistance Contact guard   Static Sitting-Comment/# of Minutes (Stone unsupporetd sitting w/o UE support, emphasizing posture)   Dynamic Sitting Balance   Dynamic Sitting-Balance Support Right upper extremity supported;Left upper extremity supported;Feet supported   Dynamic Sitting-Balance Forward lean;Reaching for objects;Reaching across midline;Trunk control activities   Dynamic Sitting-Comments (Reaching and rotation with 1x UE support; return to midline)   Supine   Supine-Exercises Lower extremity   Supine-Exercise Comments (HS / knee and hip flex ext 2 x15 - slow /control)   Seated   Seated-Exercises Lower extremity   Seated-Exercise Comments (Alt LAQ, Hip ADD with Ball and MiniMarches 2x15 )   Activity Tolerance   Activity Tolerance Patient tolerated treatment well   Other Comments   Comments (Stone tx very well. Verbal cues to slow down for safety )   After Treatment   Patient Position After Treatment Seated in wheelchair   Patient after treatment left (left in the care of ST )   Assessment   Prognosis Good   Problem List Decreased endurance;Impaired balance;Decreased mobility;Decreased coordination;Impaired vision   Session Assessment Stone tx very well Need  verbal cues to slow down for improved safety with all mobility - especially gait.    Assessment (Goals in progress. )   Level of Motivation/Participation (Good )   Barriers to Discharge Inaccessible home environment;Decreased caregiver support   Barriers to Discharge Comments (8 DENNY )   Discharge Recommendations   Equipment Needed After Discharge walker, arin   Discharge Facility/Level Of Care Needs outpatient PT   Plan   Planned Therapy Intervention Continue with current plan   Therapy Frequency 2 times/day;Monday-Friday;Saturday or Sunday   Physical Therapy Follow-up   PT Follow-up? Yes   PT - Next Visit Date 10/16/17   Treatment/Billable Minutes   Gait training 45   Therapeutic Exercise 30   Train/Wheelchair Management 15   Total Time 90       Troy Tirado PTA  10/14/2017    LEGEND:   CGA: Contact Guard Assist   EOB: Edgeof Bed   HHA: Hand Held Assist   HOB: Head of Bed   (I): Independent-patient performs task in a timely manner   Max (A): Maximal Assist-patient performs 25-49% of task   Min (A): Minimal Assist- patient performs 75% or more of task   Mod (A): Moderate Assist- patient performs 50-74% of task   NA: Not applicable   NT: Not tested   OOB: Out of Bed   PTA: Prior to admit   QC: Quad Cane   RW: Rolling Walker   (S): Supervision- patient requires cues, coaxing, prompting   SBA: Stand By Assist   SC: Straight Cane   SW: Standard Walker   TBA: To be assessed   Total (A): Total Assist- patient performs less than 25% of task   WC: Wheelchair   WFL: Within Functional Limits   WNL: Within Normal Limits

## 2017-10-14 NOTE — PROGRESS NOTES
[facility]  Physical Medicine & Rehab  Progress Note    Patient Name: Naty St  MRN: [id number]  Patient Class: IP- Rehab   Admission Date: 10/11/2017  Length of Stay: 3 days  Attending Physician: Yordy Ponce MD  Primary Care Provider: Olvin Mars MD    Subjective:     Principal Problem: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    Interval History: Pt without new c/o's.  I have reviewed the HPI and PMFSH and there are no changes from admission.       Scheduled Medications:    amitriptyline  50 mg Oral QHS    amlodipine  5 mg Oral Daily    atorvastatin  40 mg Oral Daily    dronabinol  5 mg Oral BID AC    levetiracetam  750 mg Oral BID    lipase-protease-amylase 12,000-38,000-60,000 units  1 capsule Oral TID WM    magnesium oxide  400 mg Oral BID    metoprolol succinate  50 mg Oral Daily    rivaroxaban  20 mg Oral Daily with dinner    senna-docusate 8.6-50 mg  1 tablet Oral BID       PRN Medications: bisacodyl, influenza, lorazepam, magnesium hydroxide 400 mg/5 ml, ondansetron, pneumoc 13-marcus conj-dip cr(PF), promethazine, ramelteon    Review of Systems   Constitutional: Negative for chills, fatigue and fever.   HENT: Negative for trouble swallowing and voice change.    Eyes: Negative for photophobia and visual disturbance.   Respiratory: Negative for cough, shortness of breath and wheezing.    Cardiovascular: Negative for chest pain and palpitations.   Gastrointestinal: Negative for abdominal distention and nausea.   Genitourinary: Negative for difficulty urinating and flank pain.   Musculoskeletal: Positive for gait problem. Negative for arthralgias.   Skin: Negative for color change and rash.   Neurological: Positive for speech difficulty and weakness. Negative for facial asymmetry, numbness and headaches.   Psychiatric/Behavioral: Negative for agitation and confusion.     Objective:     Vital Signs (Most Recent):  Temp: 98.4 °F (36.9 °C)  (10/14/17 07)  Pulse: 62 (10/14/17 0753)  Resp: 16 (10/14/17 07)  BP: (!) 114/58 (10/14/17 0753)  SpO2: 95 % (10/14/17 07)    Vital Signs (24h Range):  Temp:  [97.7 °F (36.5 °C)-98.4 °F (36.9 °C)] 98.4 °F (36.9 °C)  Pulse:  [62-79] 62  Resp:  [16-18] 16  SpO2:  [95 %] 95 %  BP: (108-120)/(53-62) 114/58     Physical Exam   Constitutional: She appears well-developed and well-nourished.   HENT:   Head: Normocephalic and atraumatic.   Eyes: EOM are normal. Pupils are equal, round, and reactive to light.   Neck: Normal range of motion.   Cardiovascular: Normal rate and regular rhythm.    Pulmonary/Chest: Effort normal. No respiratory distress.   Abdominal: Soft. There is no tenderness.   Musculoskeletal: Normal range of motion. She exhibits no deformity.   Neurological:   -  Mental Status:  AAOx3.  Follows commands.  Answers correct age and .  Recent and remote memory intact.  No neglect.    -  Speech and language:  - aphasia + dysarthria.    -  Coordination:  Finger to nose exam:  RUE mild dysmetria, LUE dysmetria.   -  Motor:  RUE: 4/5, 4/5 .  LUE: 5/5, 5/5 .  RLE: 5/5, DF 5/5, PF 5/5.  LLE: 5/5, DF 5/5, PF 5/5.   -  pronator drift. negative  -  Tone:  normal  -  Sensory:  Intact to light touch and pin prick.       Skin: Skin is warm and dry.   Psychiatric: She has a normal mood and affect. Her behavior is normal.   Vitals reviewed.    NEUROLOGICAL EXAMINATION:     CRANIAL NERVES     CN III, IV, VI   Pupils are equal, round, and reactive to light.  Extraocular motions are normal.       Assessment/Plan:      Naty St is a 72 y.o. female admitted to inpatient rehabilitation on 10/11/2017 for 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 with impaired mobility and ADLs. Patient remains appropriate for PT, OT, and as required Speech therapy. Patient continues to require 24 hour nursing care as well as daily Physician assessment.    * Cerebral infarction due to embolism of  right cerebellar artery    PT/OT/SLP    Transfers   Bed/Chair/WC 3   Toilet 3   Tub 1   Self Care   Eating 5   Grooming 5   Bathing 4   Dressing-Upper 4   Dressing-Lower 3   Toileting 3   Communication   Comprehension 4   Mode B   Expression 3   Mode B   Social Cognition   Social Interaction 4   Problem Solving 3   Memory 2             Ataxia    Likely 2/2 stroke. Continue working with PT/OT        CKD (chronic kidney disease) stage 3, GFR 30-59 ml/min    Currently stable. Will continue to monitor        Hypomagnesemia    1.7 today. Continue Mag Oxide 400mg BID. Continue to monitor        Essential hypertension    Controlled. Continue meds.            DISCHARGE PLANNING:  Tentative Discharge Date: 10/26/2017    Future Appointments  Date Time Provider Department Center   11/9/2017 9:30 AM NOM PET CT LIMIT 500 LBS NOM PET CT [facility]   11/9/2017 2:00 PM Pablo Lr MD [facility] STROKE [address]   11/10/2017 9:30 AM LAB, HEMONC CANCER BLDG [facility] HO [facility]illard   11/10/2017 10:30 AM Karrie Vazquez MD [facility] HEM ONC [facility]illard   11/10/2017 11:30 AM INJECTION, [facility] INFUSION [facility] CHEMO Daniel Ponce MD  Department of Physical Medicine & Rehab   [facility]

## 2017-10-14 NOTE — PROGRESS NOTES
Occupational Therapy  FIM SCORES    Naty St  MRN: [id number]  Room/Bed: E278/E278 A       10/14/17 1031   Transfers   Bed/Chair/WC 4   Self Care   Grooming 5       Diana Roy OT  10/14/2017

## 2017-10-14 NOTE — PROGRESS NOTES
"Occupational Therapy   Functional Dressing Group    Naty St  MRN: [id number]  Room/Bed: E278/E278 A       10/14/17 1116   OT Time Calculation   OT Start Time 1116   OT Stop Time 1201   OT Total Time (min) 45 min   General   OT Date of Treatment 10/14/17   Family/Caregiver Present Yes  (Son)   Patient Found (position) Seated in wheelchair   Precautions   General Precautions fall   Visual/Auditory Vision impaired   Subjective   Patient states "I will have someone home with me when I am dressing."   Transfers   Transfer yes   Sit to Stand   Sit <> Stand Assistance Contact Guard Assistance   Sit <> Stand Assistive Device Rolling Walker   Trials/Comments from w/c   Stand to Sit   Assistance Contact Guard Assistance   Assistive Device Rolling Walker   Trials/Comments to w/c   OT Therapeutic Groups   OT Therapeutic Yes   Other Pt participated in 45 min functional dressing group. The group activity reviewed safety considerations, energy conservation, and adaptive strategies for upper body and lower body dressing. Patient educated on adaptive equipment available to assist with dressing (button hook, long handled shoe horn, reacher, dressing stick, elastic shoe laces). Patient also educated on dressing tips that promote increased (I) with dressing such as wearing loose fit clothing, using footstool, and adapted clothing available.     UB dressing (pullover shirt, button down shirt, jacket):  Standby Assist    LB dressing (socks)  Standby Assist                      (pants)   CGA Assist                      (shoes)  Min Assist for shoelaces    These activities were performed in a group setting to encourage participation with peers and social interaction skills.      After Treatment   Patient Position After Treatment Seated in wheelchair   Patient after treatment left (posey belt donned; with family)   Occupational Therapy Follow-up   OT Follow-up? Yes   Treatment/Billable Minutes   Therapeutic Group 45   Total Time " 45       Diana Roy, OT  10/14/2017    LEGEND:   CGA: Contact Guard Assist   EOB: Edge of Bed   HHA: Hand Held Assist   HOB: Head of Bed   (I): Independent-patient performs task in a timely manner   Max (A): Maximal Assist-patient performs 25-49% of task   Min (A): Minimal Assist- patient performs 75% or more of task   Mod (A): Moderate Assist- patient performs 50-74% of task   NA: Not applicable   NT: Not tested   OOB: Out of Bed   PTA: Prior to admit   QC: Quad Cane   RW: Rolling Walker   (S): Supervision- patient requires cues, coaxing, prompting   SBA: Stand By Assist   SC: Straight Cane   SW: Standard Walker   TBA: To be assessed   Total (A): Total Assist- patient performs less than 25% of task   WC: Wheelchair   WFL: Within Functional Limits   WNL: Within Normal Limits

## 2017-10-14 NOTE — PROGRESS NOTES
NURSE TECH FIM  REPORT    EATING  [] Pt. opens packages, cuts food, pours liquids, and feeds self.  Regular consistency (7)  [] Pt. needs dentures, swallow technique, special foods or drink consistency (6)  [x] Pt. needs supervision (cueing), set up (open containers, cut food, etc. (5)  [] Pt. needs assist with occasional scooping or checking for food pocketing (75-99%) (4)  [] Pt. needs assist to load each bite on utensils, pt.brings food to mouth (50-74%) (3)  [] Pt. needs assist to scoop, bring food to mouth (hand over hand) (25-49%) (2)  [] Pt. Is receiving IV fluids for hydration or tube feedings  (0-24%) (1)      GROOMING   [] Pt. performs all tasks independently and safely (7)  [] Pt. obtains all articles.  Needs adaptive/assistive device (such as wash mitt) (6)  [] Pt. needs supervision (cueing), set up (helper obtains articles, applies toothpaste, plugs razor, hands items to patient, opens containers, adjusts water temperature) (5)       [] Pt. doing 3 out of 4,  or 4 out of 5 tasks.  Needs assist to put in or remove dentures.  Needs steadying assist.(Pt. Doing 75-99%. (4)                                                                 [] Pt. doing 2 out of 4, or 3 out of 5 tasks (50-74%) (3)  [] Pt. doing 1 out of 4, or 2 out of 5 tasks (25-49%) (2)  [] Pt. doing 0 out of 4, or 1 out of 5 tasks or needs assistance of 2 helpers (0-24%) (1)  [] Activity done with OT      BATHING  [] Pt. safely bathes whole body (10 parts of 10) (7)  [] Pt. doing 10 out of 10 body parts.  Uses adaptive devices (such as wash mitt, long handled sponge, etc.) (6)  []  Pt. doing 8-9 out of 10 body parts , or pt. needs steadying assistance. (75-99%) (4)  []  Pt. doing 10 out of 10 body parts buts needs supervision or set up (5)  [] Pt. doing 5-7 out of 10 body parts (50-74%) (3)  [] Pt. doing 3-4 of out 10 body parts (25-49%) (2)  [] Pt. doing 0-2 out of 10 body parts or needs assist of two helpers. (0-24%) (1)    [] Activity done  with OT      DRESSING UPPER BODY  [] Pt. dresses independently and undresses independently, obtaining clothes from          storage area (7)  [] Pt. needs adaptive devices (such as Velcro fastener, reacher, button hook, etc.)          Applies abdominal binder or any orthotic.  Uses  walker for steadying. (6)  [] Pt. needs supervision (cueing), set up (helper brings clothes or applies orthotics (such as abdominal binder, TLSO, cervical collar) (5)  [x] Pt. doing 75-99%. (4)  [] Pt. doing 50-74%. (3)  [] Pt. doing 25-49%. (2)  [] Pt. doing 0-24%, or needs assistance of 2 helpers. (1)  [] Activity done with OT.      DRESSING LOWER BODY  [] Pt. independent with all parts of dressing lower body. (7)  [] Pt. needs adaptive devices ( such as reacher, long handled shoe horn, sock aid) (6)  [] Pt. needs supervision (cueing), set up(helper brings clothes or applies orthotic, such   as TALON hose, AFO) (5)  [] Pt. doing 75-99%.  Needs steadying assistance: buttoning pants, zipping a zipper,        fastening a belt, tying shoelaces, applying one sock/shoe. (4)  [x] Pt. doing 50-74%. (3)  [] Pt. doing 25-49%. (2)  [] Pt. Doing 0-24%, or needs assistance of 2 helpers. (1)  [] Activity done with OT.      TOILETING  [] Pt. doing 3 out of 3 tasks independently (pulling down clothes, cleansing elizabeth area,  pulling up clothes) (7)  [] Pt. doing 3 out of 3 tasks, but needs assistive device (grab bars, etc.) (6)  [] Pt. needs supervision (cueing), or set up (obtaining supplies for elizabeth care.) (5)  [x] Pt. doing 3 out of 3 tasks, but needs steadying assistance with one or more parts. (75-90%) (4)  [] Pt. doing 2 out of 3 tasks (50-74%) (3)  [] Pt. doing out of 3 tasks (25-49%) (2)  [] Pt. needs assist (more than steadying) with all 3 tasks.  Needs assist of 2 helpers.    Incontinent of B/B today. (0-24%) (1)  [] Activity did not occur.      BLADDER  [x] Pt.  uses toilet (7)  [] Pt. is on dialysis - does not urinate (7)  [] Pt. uses bedside  commode (5)  [] Pt. uses bedpan - staff does ____% of tasks(includes rolling on/off, holding bedpan in place, hygiene, and emptying pan)   Pt. uses urinal - staff empties (5)                     []  Pt. needs help with positioning the urinal (4)                     []  Pt. needs help holding the urinal (1)  [] Pt. has lazo catheter/suprapubic catheter/concom cath - staff empties (1)  [] Pt. is on ICP:                     []  Pt. does catheterization (6)                     []  Staff does catheterization (1)  [] Pt. is incontinent - staff does ____% of tasks (includes clothes management,  hygiene, and changing diaper)  [] Number of accidents during this shift ____       BOWEL  [] Pt. uses toilet (7)  [] Pt. uses bedside commode (5)  [] Pt. uses bedpan - staff does ____% of tasks (includes rolling on/off, holding bedpan                                                          In place, hygiene, and emptying pan)  [] Pt. on bowel program:                    [] Pt.inserts suppository (6)                    [] Nurse inserts suppository (4)                    []  Nurse does dig. stim (1)  [] Pt. has colostomy - staff maintains care and empties (1)                    [] Pt. does ____% of care for colostomy  [] Pt. is incontinent - staff does ____% of tasks (includes clothes management,  hygiene, and changing diaper)  [] Number of accidents during this shift____  [x] No bowel movement this shift      TRANSFERS:  BED/CHAIR/WHEELCHAIR  [] Pt. transfers without assistive device into and out of wheelchair/bed/chair (7)  [] Pt. uses assistive/adaptive devices (grab bars, sliding board, walker, bed rails,  wheelchair armrests, etc.) (6)  [] Pt. needs supervision, cues, or head of bed raised by staff (5)  [] Pt. needs steadying assist with any or all parts of transfer, or needs assist with one  leg during transfer (4)  [] Pt. needs lifting or lowering, or needs assist with 2 legs during transfer (3)  [x] Pt. needs lifting and  lowering during transfer (2)  [] Pt. needs assist of 2 helpers or mechanical lift (1)  [] Activity did not occur   ________________________________________________________________  Includes lying to seated position at edge of bed.  Check assist level needed:  [] Used bedrails              []  Supervision                   []   Min. A  [] Mod A                          []  Max A                            []  Total A    If in wheelchair:  Check assist level needed:  [] Lock brakes                 []  Lifts/lowers footrests       []  Removes w/c arm                                                                                (for slide board transfers)    TRANSFER:  TOILET/BEDSIDE COMMODE   [] Pt. transfers from wheelchair or walking without assistive device to toilet.  Gets on and off a standard toilet. (7)  [] Pt. uses assistive/adaptive device (commode, grab bars, sliding board, walker prosthesis, orthotic, raised toilet seat) (6)  [] Pt. needs supervision, cues, or set up (needs assist to lock brakes, remove leg or arm rest, position sliding board) (5)  [] Pt. needs steadying assist with any or all parts of transfer or needs assist with one leg during transfer. (4)  [] Pt. needs lifting or lowering or needs assist with two legs during transfer. (3)  [x] Pt. needs lifting and lowering during transfer. (2)  [] Pt. needs assist of 2 helpers or mechanical lift. (1)  [] Activity did not occur.      TRANSFER:  SHOWER  [] Pt. transfers without assistive device into and out of shower. (7)  [] Pt. uses assistive/adaptive device (shower chair/bench, grab bars, sliding board,   walker, prothesis, orthotic, raised toilet seat. (6)  [] Pt. needs supervision, cues, or set up (needs assist to lock brakes, remove leg or armrest, position sliding board) (5)  [] Pt. needs steadying assist with any or all parts of transfer or needs assist with one      leg during transfer. (4)  [] Pt. needs lifting or lowering or needs assist with  two legs during transfer. (3)  [] Pt. needs lifting and lowering during transfer. (2)  [] Pt. needs assist of 2 helpers.  [place] pushes shower chair on wheels in and out of   shower. (1)  [x] Activity did not occur.                                                                                                                                                                                 r                                                                                                                    pT.

## 2017-10-14 NOTE — PROGRESS NOTES
"Speech Therapy   Treatment    Naty St   MRN: [id number]            10/14/17 0945   Speech Time Calculation   Speech Start Time 0945   General Information   SLP Treatment Date 10/14/17   General Observations Patient seen while siting upright in w/c in ST office,   Subjective   Patient states "Everything's happening so fast this morning, I haven't had a chance to brush my teeth."   Pain/Comfort   Pain Rating no pain       SLP Cognitive Treatment   Treatment Detail (SLP Cognitive Treatment) Patient oriented x4 independently. Patient recalled recent information with min verbal prompting. In a functional recall task, patient recalled details from brief narrative with 80% accuracy independently and given min verbal cues. Patient completed verbal safety awareness task using problem solving picture cards with 100% accuracy independently. In a word deductiont ask, patient identified word with 80% accuracy independently, 100% accuracy given min verbal cues. In a logical reasoning task, patient demonstrated max difficulty providing outcomes of hypothetical situations. Pt stated, "I'm not understanding the task," despite max verbal cues.        SLP Treatment: Verbal Expression   Treatment Detail (SLP Verbal Expression) To improve word fluency and naming skills, patient participated in naming tasks. Patient completed concrete and abstract convergent naming tasks with 100% accuracy independently. In a concrete divergent naming task, patient listed x10 things at the beach in 1 minute, and pt listed x8 occupations in 1 minute, given mod verbal cues for both tasks. Patient enagged in speech intelligibility tasks using clear speech strategies.Pt read 6-10 word sentences with 75% speech intelligibility given min verbal cues to implement strategies. Pt's speech intell observed to be <60% in conversational speech.   Assessment   SLP Diagnosis moderate-severe cognitive-linguistic deficits; moderate dysarthria   Prognosis Good "   Problem List decreased speech intelligibility; decreased word finding skills; decreased recall; decreased reasoning skills   Session Assessment progressing toward goals   Level of Motivation/Participation Good   Discharge Recommendations   Discharge Facility/Level Of Care Needs outpatient speech therapy   Plan   Plan Continue with current plan   Therapy Frequency Monday-Friday   SLP Follow-up   SLP Follow-up? Yes   Treatment/Billable Minutes   Speech Therapy Individual 45   Total Time 45     FIM Scores  Comprehension:4  Expression: 3  Social Interaction:4  Problem Solving:3  Memory: 2     Comprehension:  Complex= humor, finances, rationale for medical treatment(hip precautions, pressure relief)  Basic= pain, hunger, thirst, bathroom needs, cold, nutrition, sleep     Understands basic 75-89%- may need words repeated (4)     Expression:  Expresses basic 50-74% of time - Needs to repeat parts of sentences  (3)     Social Interaction:  Interacts appropriately 75-89% of time - needs redirection for appropriate language or to initiate interaction  (4)     Problem Solving:  Solves basic problems 50-74% of the time  (3)     Memory:  Recognizes, recalls, or executes 25-49% of time 1 step of 2 step request  (2)    LEONEL Marcus-SLP  10/14/2017

## 2017-10-14 NOTE — PROGRESS NOTES
"Occupational Therapy  AM and PM Treatment  Naty St   MRN: [id number]   Room/Bed: E278/E278 A       10/14/17 1031 10/14/17 1300   OT Time Calculation   OT Start Time 1030 1300   OT Stop Time 1101 1400   OT Total Time (min) 31 min 60 min   General   OT Date of Treatment 10/14/17 10/14/17   Family/Caregiver Present No Yes  (Son)   Patient Found (position) Seated in wheelchair Seated in wheelchair   Precautions   General Precautions fall --    Visual/Auditory Vision impaired --    Subjective   Patient states "I need to wash my face and brush my teeth."  --    Pain/Comfort   Pain Rating no pain --    Transfers   Transfer yes yes   Sit to Stand   Sit <> Stand Assistance Contact Guard Assistance Contact Guard Assistance   Sit <> Stand Assistive Device Rolling Walker Rolling Walker   Trials/Comments from w/c from w/c   Stand to Sit   Assistance Contact Guard Assistance Contact Guard Assistance   Assistive Device Rolling Walker Rolling Walker   Trials/Comments CGA for steaadying assist to w/c CGA for steadying assist to w/c   Grooming   Grooming Level of Assistance Stand by assistance --    Grooming Where Assessed Wheelchair;Sitting sinkside --    Grooming Comments Set-up for opening toothpaste; pt performed oral hygiene, handwashing, and face washing --    Exercise Tools   Exercise Tools --  Yes   Bilateral Hardy --  5x20 reps inclined 3# to increase UB strength for functional t/fs   Additional Activities   Additional Activities Other (Comment) Other (Comment)   Additional Activities Comments Pt performed ther ax while standing at tabletop with RW and CGA for steadying assist. Pt completed visual spatial board in standing x10 minutes with a seated rest break for increased dynamic standing balance and endurance for IADLs. Pt completed BITS activities in standing with RW and CGA. Pt completed BITS for increased standing endurance, weight-shifting, balance, and coordination with the following results: single " target task 47.07% accuracy, 3.38 sec reaction time, 71 hits; smooth pursuit 5:00 mins, 47.06% accuracy; bell task 3:54 minutes, 7 misses, 10 distractors.    Coordination   Fine Motor --  Pt utilized two pt pinch with R hand to place and remove ten pegs from red tputty to increase intrinsic hand strength and R hand coordination.    Activity Tolerance   Activity Tolerance Patient tolerated treatment well --    After Treatment   Patient Position After Treatment Seated in wheelchair Seated in wheelchair   Patient after treatment left call button in reach  (posey belt donned) call button in reach  (posey belt donned; with son)   Assessment   Prognosis Good --    Problem List Decreased Self Care skills;Decreased upper extremity strength;Decreased endurance;Decreased fine motor control;Decreased functional mobility;Decreased gross motor control;Decreased IADLs --    Assessment --  Pt tolerated AM and PM tx sessions well today with no c/o pain. Pt with ataxia in RUE during precise, skilled fine motor movements. Pt progressing with standing balance and endurance. Pt with good effort towards therapy and would benefit from continued skilled OT services to increase (I) with ADLs/IADLs.    Level of Motivation/Participation good --    Barriers to Discharge None --    Discharge Recommendations   Equipment Needed After Discharge walker, arin --    Discharge Facility/Level Of Care Needs outpatient OT --    Plan   Plan Continue with current plan --    Therapy Frequency 2 times/day --    Occupational Therapy Follow-up   OT Follow-up? Yes --    Treatment/Billable Minutes   Self Care/Home Management 20 --    Therapeutic Exercise --  22   Neuromuscular Re-ed 10 38   Total Time 30 60       Diana Roy OT  10/14/2017    LEGEND:   CGA: Contact Guard Assist   EOB: [place] [facility]   HHA: Hand Held Assist   HOB: Head of Bed   (I): Independent-patient performs task in a timely manner   Max (A): Maximal Assist-patient performs 25-49% of task    Min (A): Minimal Assist- patient performs 75% or more of task   Mod (A): Moderate Assist- patient performs 50-74% of task   NA: Not applicable   NT: Not tested   OOB: Out of Bed   PTA: Prior to admit   QC: Quad Cane   RW: Rolling Walker   (S): Supervision- patient requires cues, coaxing, prompting   SBA: Stand By Assist   SC: Straight Cane   SW: Standard Walker   TBA: To be assessed   Total (A): Total Assist- patient performs less than 25% of task   WC: Wheelchair   WFL: Within Functional Limits   WNL: Within Normal Limits

## 2017-10-14 NOTE — CONSULTS
[facility]  Adult Nutrition  Consult Note    SUMMARY     Recommendations    Recommendation/Intervention: Continue Regular diet, Continue boost at BID, Assist with meals, Appetite stimulant has been added, RD to follow  Goals: PO to meet 85% of EEN by discharge  Nutrition Goal Status: new  Communication of RD Recs: discussed on rounds    Continuum of Care Plan         Reason for Assessment    Reason for Assessment: physician consult  Diagnosis:  (CVA)  Relevent Medical History: Lung cancer with current chemo therapy, HTN, seizures, CKD 3   Interdisciplinary Rounds: attended     General Information Comments: pt using Marinol at home brought to MD's attention    Nutrition Discharge Planning: DC on Regular diet    Nutrition Prescription Ordered    Current Diet Order: Regular              Oral Nutrition Supplement: Boost plus vanilla     Evaluation of Received Nutrients/Fluid Intake           Energy Calories Required: not meeting needs            Protein Required: not meeting needs                Fluid Required: not meeting needs     Comments: pt has not been alert enough to eat much this past week, current intake averaging 50%  Tolerance: tolerating     % Intake of Estimated Energy Needs: 25 - 50 %  % Meal Intake: 50%     Nutrition Risk Screen     Nutrition Risk Screen: no indicators present    Nutrition/Diet History    Patient Reported Diet/Restrictions/Preferences: general  Typical Food/Fluid Intake: good po prior to admit, po currently averaging 70%  Food Preferences: No cultural or Yazdanism food preferences        Factors Affecting Nutritional Intake: depression, impaired cognitive status/motor control                Labs/Tests/Procedures/Meds       Pertinent Labs Reviewed: reviewed, pertinent     Pertinent Medications Reviewed: reviewed, pertinent  Pertinent Medications Comments: Mg, statin, pancreatic enzymes, senna-docusate, dronabinol    Physical Findings    Overall Physical Appearance:  "weak  Tubes:  (-)  Oral/Mouth Cavity: dental applicance present (specify), tooth/teeth missing  Skin: intact    Anthropometrics     Height: 5' 5" (165.1 cm)  Weight Method: Standard Scale  Weight: 64.9 kg (143 lb 1.3 oz)     Ideal Body Weight (IBW), Female: 125 lb     % Ideal Body Weight, Female (lb): 114.46 lb  BMI (Calculated): 23.9  BMI Grade: 18.5-24.9 - normal       Estimated/Assessed Needs    Weight Used For Calorie Calculations: 64 kg (141 lb 1.5 oz)    Energy Calorie Requirements (kcal): 9414-9989 (25-30 kcal/kg)      Weight Used For Protein Calculations: 64 kg (141 lb 1.5 oz)  Protein Requirements: 63-83g (1.0-1.3g/kg)    Fluid Requirements (mL): 1600  Fluid Need Method: RDA Method     Assessment and Plan    Inadequate oral intake related to decline in condition, level of consciousness as evidenced by po intake < 50% of needs x 5 days    Monitor and Evaluation  Food and Nutrient Intake: food and beverage intake  Food and Nutrient Adminstration: diet order  Physical Activity and Function: nutrition-related ADLs and IADLs  Anthropometric Measurements: weight change  Biochemical Data, Medical Tests and Procedures: gastrointestinal profile, electrolyte and renal panel, inflammatory profile  Nutrition-Focused Physical Findings: overall appearance  Nutrition Risk  Level of Risk:  (follow two times per week)  Nutrition Follow-Up  RD Follow-up?: Yes      "

## 2017-10-14 NOTE — SUBJECTIVE & OBJECTIVE
Interval History: Pt without new c/o's.  I have reviewed the HPI and PMFSH and there are no changes from admission.       Scheduled Medications:    amitriptyline  50 mg Oral QHS    amlodipine  5 mg Oral Daily    atorvastatin  40 mg Oral Daily    dronabinol  5 mg Oral BID AC    levetiracetam  750 mg Oral BID    lipase-protease-amylase 12,000-38,000-60,000 units  1 capsule Oral TID WM    magnesium oxide  400 mg Oral BID    metoprolol succinate  50 mg Oral Daily    rivaroxaban  20 mg Oral Daily with dinner    senna-docusate 8.6-50 mg  1 tablet Oral BID       PRN Medications: bisacodyl, influenza, lorazepam, magnesium hydroxide 400 mg/5 ml, ondansetron, pneumoc 13-marcus conj-dip cr(PF), promethazine, ramelteon    Review of Systems   Constitutional: Negative for chills, fatigue and fever.   HENT: Negative for trouble swallowing and voice change.    Eyes: Negative for photophobia and visual disturbance.   Respiratory: Negative for cough, shortness of breath and wheezing.    Cardiovascular: Negative for chest pain and palpitations.   Gastrointestinal: Negative for abdominal distention and nausea.   Genitourinary: Negative for difficulty urinating and flank pain.   Musculoskeletal: Positive for gait problem. Negative for arthralgias.   Skin: Negative for color change and rash.   Neurological: Positive for speech difficulty and weakness. Negative for facial asymmetry, numbness and headaches.   Psychiatric/Behavioral: Negative for agitation and confusion.     Objective:     Vital Signs (Most Recent):  Temp: 98.4 °F (36.9 °C) (10/14/17 0701)  Pulse: 62 (10/14/17 0753)  Resp: 16 (10/14/17 0701)  BP: (!) 114/58 (10/14/17 0753)  SpO2: 95 % (10/14/17 0701)    Vital Signs (24h Range):  Temp:  [97.7 °F (36.5 °C)-98.4 °F (36.9 °C)] 98.4 °F (36.9 °C)  Pulse:  [62-79] 62  Resp:  [16-18] 16  SpO2:  [95 %] 95 %  BP: (108-120)/(53-62) 114/58     Physical Exam   Constitutional: She appears well-developed and well-nourished.   HENT:    Head: Normocephalic and atraumatic.   Eyes: EOM are normal. Pupils are equal, round, and reactive to light.   Neck: Normal range of motion.   Cardiovascular: Normal rate and regular rhythm.    Pulmonary/Chest: Effort normal. No respiratory distress.   Abdominal: Soft. There is no tenderness.   Musculoskeletal: Normal range of motion. She exhibits no deformity.   Neurological:   -  Mental Status:  AAOx3.  Follows commands.  Answers correct age and .  Recent and remote memory intact.  No neglect.    -  Speech and language:  - aphasia + dysarthria.    -  Coordination:  Finger to nose exam:  RUE mild dysmetria, LUE dysmetria.   -  Motor:  RUE: 4/5, 4/5 .  LUE: 5/5, 5/5 .  RLE: 5/5, DF 5/5, PF 5/5.  LLE: 5/5, DF 5/5, PF 5/5.   -  pronator drift. negative  -  Tone:  normal  -  Sensory:  Intact to light touch and pin prick.       Skin: Skin is warm and dry.   Psychiatric: She has a normal mood and affect. Her behavior is normal.   Vitals reviewed.    NEUROLOGICAL EXAMINATION:     CRANIAL NERVES     CN III, IV, VI   Pupils are equal, round, and reactive to light.  Extraocular motions are normal.

## 2017-10-14 NOTE — PROGRESS NOTES
Called Dr. Ponce about patients blood pressure being 108/53 then when patient sit up it was 114/58 and patient taken 5 mg amlodipine and metoprolol succinate 24 hour tablet 50 mg. Dr. Ponce gave orders to administer both medications.

## 2017-10-15 PROBLEM — L73.9 FOLLICULITIS: Status: ACTIVE | Noted: 2017-10-15

## 2017-10-15 PROBLEM — K12.1 STOMATITIS: Status: ACTIVE | Noted: 2017-10-15

## 2017-10-15 PROCEDURE — 12800000 HC REHAB SEMI-PRIVATE ROOM

## 2017-10-15 PROCEDURE — 25000003 PHARM REV CODE 250: Performed by: STUDENT IN AN ORGANIZED HEALTH CARE EDUCATION/TRAINING PROGRAM

## 2017-10-15 PROCEDURE — 99232 SBSQ HOSP IP/OBS MODERATE 35: CPT | Mod: ,,, | Performed by: PHYSICAL MEDICINE & REHABILITATION

## 2017-10-15 PROCEDURE — 63600175 PHARM REV CODE 636 W HCPCS: Performed by: PHYSICAL MEDICINE & REHABILITATION

## 2017-10-15 PROCEDURE — 25000003 PHARM REV CODE 250: Performed by: PHYSICAL MEDICINE & REHABILITATION

## 2017-10-15 RX ORDER — CHLORHEXIDINE GLUCONATE ORAL RINSE 1.2 MG/ML
15 SOLUTION DENTAL 2 TIMES DAILY
Status: DISCONTINUED | OUTPATIENT
Start: 2017-10-15 | End: 2017-10-26 | Stop reason: HOSPADM

## 2017-10-15 RX ORDER — DOXYLAMINE SUCCINATE 25 MG
TABLET ORAL 2 TIMES DAILY
Status: DISCONTINUED | OUTPATIENT
Start: 2017-10-15 | End: 2017-10-23

## 2017-10-15 RX ADMIN — ATORVASTATIN CALCIUM 40 MG: 20 TABLET, FILM COATED ORAL at 08:10

## 2017-10-15 RX ADMIN — PANCRELIPASE 1 CAPSULE: 60000; 12000; 38000 CAPSULE, DELAYED RELEASE PELLETS ORAL at 11:10

## 2017-10-15 RX ADMIN — CHLORHEXIDINE GLUCONATE 15 ML: 1.2 RINSE ORAL at 08:10

## 2017-10-15 RX ADMIN — PANCRELIPASE 1 CAPSULE: 60000; 12000; 38000 CAPSULE, DELAYED RELEASE PELLETS ORAL at 04:10

## 2017-10-15 RX ADMIN — DRONABINOL 5 MG: 2.5 CAPSULE ORAL at 05:10

## 2017-10-15 RX ADMIN — Medication 400 MG: at 08:10

## 2017-10-15 RX ADMIN — AMITRIPTYLINE HYDROCHLORIDE 50 MG: 25 TABLET, FILM COATED ORAL at 08:10

## 2017-10-15 RX ADMIN — ERLOTINIB HYDROCHLORIDE 150 MG: 150 TABLET, FILM COATED ORAL at 09:10

## 2017-10-15 RX ADMIN — RIVAROXABAN 20 MG: 20 TABLET, FILM COATED ORAL at 04:10

## 2017-10-15 RX ADMIN — PANCRELIPASE 1 CAPSULE: 60000; 12000; 38000 CAPSULE, DELAYED RELEASE PELLETS ORAL at 08:10

## 2017-10-15 RX ADMIN — LEVETIRACETAM 750 MG: 750 TABLET ORAL at 08:10

## 2017-10-15 RX ADMIN — METOPROLOL SUCCINATE 50 MG: 50 TABLET, EXTENDED RELEASE ORAL at 08:10

## 2017-10-15 RX ADMIN — DRONABINOL 5 MG: 2.5 CAPSULE ORAL at 04:10

## 2017-10-15 RX ADMIN — AMLODIPINE BESYLATE 5 MG: 5 TABLET ORAL at 08:10

## 2017-10-15 NOTE — PROGRESS NOTES
[facility]  Physical Medicine & Rehab  Progress Note    Patient Name: Naty St  MRN: [id number]  Patient Class: IP- Rehab   Admission Date: 10/11/2017  Length of Stay: 4 days  Attending Physician: Yordy Ponce MD  Primary Care Provider: Olvin Mars MD    Subjective:     Principal Problem: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    Interval History: Pt complaining of mouth sores from dentures as well as some sores on back of her head.  [facility] and [facility] are no changes from [facility].       Scheduled Medications:    amitriptyline  50 mg Oral QHS    amlodipine  5 mg Oral Daily    atorvastatin  40 mg Oral Daily    benzocaine   Mouth/Throat BID    chlorhexidine  15 mL Mouth/Throat BID    dronabinol  5 mg Oral BID AC    erlotinib  150 mg Oral QHS    levetiracetam  750 mg Oral BID    lipase-protease-amylase 12,000-38,000-60,000 units  1 capsule Oral TID WM    magnesium oxide  400 mg Oral BID    metoprolol succinate  50 mg Oral Daily    miconazole   Topical (Top) BID    rivaroxaban  20 mg Oral Daily with dinner    senna-docusate 8.6-50 mg  1 tablet Oral BID       PRN Medications: bisacodyl, influenza, lorazepam, magnesium hydroxide 400 mg/5 ml, ondansetron, pneumoc 13-marcus conj-dip cr(PF), promethazine, ramelteon    Review of Systems   Constitutional: Negative for chills, fatigue and fever.   HENT: Negative for trouble swallowing and voice change.    Eyes: Negative for photophobia and visual disturbance.   Respiratory: Negative for cough, shortness of breath and wheezing.    Cardiovascular: Negative for chest pain and palpitations.   Gastrointestinal: Negative for abdominal distention and nausea.   Genitourinary: Negative for difficulty urinating and flank pain.   Musculoskeletal: Positive for gait problem. Negative for arthralgias.   Skin: Negative for color change and rash.   Neurological: Positive for speech difficulty  and weakness. Negative for facial asymmetry, numbness and headaches.   Psychiatric/Behavioral: Negative for agitation and confusion.     Objective:     Vital Signs (Most Recent):  Temp: 98.2 °F (36.8 °C) (10/15/17 0648)  Pulse: 69 (10/15/17 0648)  Resp: 16 (10/15/17 0648)  BP: (!) 127/58 (10/15/17 0648)  SpO2: 97 % (10/15/17 0648)    Vital Signs (24h Range):  Temp:  [98.2 °F (36.8 °C)-98.6 °F (37 °C)] 98.2 °F (36.8 °C)  Pulse:  [69-74] 69  Resp:  [16] 16  SpO2:  [96 %-97 %] 97 %  BP: (122-127)/(58-61) 127/58     Physical Exam   Constitutional: She appears well-developed and well-nourished.   HENT:   Head: Normocephalic and atraumatic.   Sores on superior palate   Eyes: EOM are normal. Pupils are equal, round, and reactive to light.   Neck: Normal range of motion.   Cardiovascular: Normal rate and regular rhythm.    Pulmonary/Chest: Effort normal. No respiratory distress.   Abdominal: Soft. There is no tenderness.   Musculoskeletal: Normal range of motion. She exhibits no deformity.   Neurological:   -  Mental Status:  AAOx3.  Follows commands.  Answers correct age and .  Recent and remote memory intact.  No neglect.    -  Speech and language:  - aphasia + dysarthria.    -  Coordination:  Finger to nose exam:  RUE mild dysmetria, LUE dysmetria.   -  Motor:  RUE: 4/5, 4/5 .  LUE: 5/5, 5/5 .  RLE: 5/5, DF 5/5, PF 5/5.  LLE: 5/5, DF 5/5, PF 5/5.   -  pronator drift. negative  -  Tone:  normal  -  Sensory:  Intact to light touch and pin prick.       Skin: Skin is warm and dry.   Two scabs noted with some redness over occiput area   Psychiatric: She has a normal mood and affect. Her behavior is normal.   Vitals reviewed.    NEUROLOGICAL EXAMINATION:     CRANIAL NERVES     CN III, IV, VI   Pupils are equal, round, and reactive to light.  Extraocular motions are normal.       Assessment/Plan:      Naty St is a 72 y.o. female admitted to inpatient rehabilitation on 10/11/2017 for Cerebral infarction  due to embolism of right cerebellar artery with impaired mobility and ADLs. Patient remains appropriate for PT, OT, and as required Speech therapy. Patient continues to require 24 hour nursing care as well as daily Physician assessment.    * 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    PT/OT/SLP    Transfers   Bed/Chair/WC 3   Toilet 3   Tub 1   Self Care   Eating 5   Grooming 5   Bathing 4   Dressing-Upper 4   Dressing-Lower 3   Toileting 3   Communication   Comprehension 4   Mode B   Expression 3   Mode B   Social Cognition   Social Interaction 4   Problem Solving 3   Memory 2             Folliculitis    Miconazole ordered BID        Ataxia    Likely 2/2 stroke. Continue working with PT/OT        CKD (chronic kidney disease) stage 3, GFR 30-59 ml/min    Currently stable. Will continue to monitor        Hypomagnesemia    1.7 today. Continue Mag Oxide 400mg BID. Continue to monitor        Essential hypertension    Controlled. Continue meds.            DISCHARGE PLANNING:  Tentative Discharge Date: 10/26/2017    Future Appointments  Date Time Provider Department Center   11/9/2017 9:30 AM [facility] PET CT LIMIT 500 LBS [facility]   11/9/2017 2:00 PM Pablo Lr MD [facility] STROKE Reece y   11/10/2017 9:30 AM LAB, HEMONC CANCER BLDG [facility] HO [facility]   11/10/2017 10:30 AM Karrie Vazquez MD [facility] HEM ONC [facility]   11/10/2017 11:30 AM INJECTION, [facility] INFUSION [facility] CHEMO Daniel Ponce MD  Department of Physical Medicine & Rehab   [facility]

## 2017-10-15 NOTE — SUBJECTIVE & OBJECTIVE
Interval History: Pt complaining of mouth sores from dentures as well as some sores on back of her head.  [facility] and there are no changes from admission.       Scheduled Medications:    amitriptyline  50 mg Oral QHS    amlodipine  5 mg Oral Daily    atorvastatin  40 mg Oral Daily    benzocaine   Mouth/Throat BID    chlorhexidine  15 mL Mouth/Throat BID    dronabinol  5 mg Oral BID AC    erlotinib  150 mg Oral QHS    levetiracetam  750 mg Oral BID    lipase-protease-amylase 12,000-38,000-60,000 units  1 capsule Oral TID WM    magnesium oxide  400 mg Oral BID    metoprolol succinate  50 mg Oral Daily    miconazole   Topical (Top) BID    rivaroxaban  20 mg Oral Daily with dinner    senna-docusate 8.6-50 mg  1 tablet Oral BID       PRN Medications: bisacodyl, influenza, lorazepam, magnesium hydroxide 400 mg/5 ml, ondansetron, pneumoc 13-marcus conj-dip cr(PF), promethazine, ramelteon    Review of Systems   Constitutional: Negative for chills, fatigue and fever.   HENT: Negative for trouble swallowing and voice change.    Eyes: Negative for photophobia and visual disturbance.   Respiratory: Negative for cough, shortness of breath and wheezing.    Cardiovascular: Negative for chest pain and palpitations.   Gastrointestinal: Negative for abdominal distention and nausea.   Genitourinary: Negative for difficulty urinating and flank pain.   Musculoskeletal: Positive for gait problem. Negative for arthralgias.   Skin: Negative for color change and rash.   Neurological: Positive for speech difficulty and weakness. Negative for facial asymmetry, numbness and headaches.   Psychiatric/Behavioral: Negative for agitation and confusion.     Objective:     Vital Signs (Most Recent):  Temp: 98.2 °F (36.8 °C) (10/15/17 0648)  Pulse: 69 (10/15/17 0648)  Resp: 16 (10/15/17 0648)  BP: (!) 127/58 (10/15/17 0648)  SpO2: 97 % (10/15/17 0648)    Vital Signs (24h Range):  Temp:  [98.2 °F (36.8 °C)-98.6 °F  (37 °C)] 98.2 °F (36.8 °C)  Pulse:  [69-74] 69  Resp:  [16] 16  SpO2:  [96 %-97 %] 97 %  BP: (122-127)/(58-61) 127/58     Physical Exam   Constitutional: She appears well-developed and well-nourished.   HENT:   Head: Normocephalic and atraumatic.   Sores on superior palate   Eyes: EOM are normal. Pupils are equal, round, and reactive to light.   Neck: Normal range of motion.   Cardiovascular: Normal rate and regular rhythm.    Pulmonary/Chest: Effort normal. No respiratory distress.   Abdominal: Soft. There is no tenderness.   Musculoskeletal: Normal range of motion. She exhibits no deformity.   Neurological:   -  Mental Status:  AAOx3.  Follows commands.  Answers correct age and .  Recent and remote memory intact.  No neglect.    -  Speech and language:  - aphasia + dysarthria.    -  Coordination:  Finger to nose exam:  RUE mild dysmetria, LUE dysmetria.   -  Motor:  RUE: 4/5, 4/5 .  LUE: 5/5, 5/5 .  RLE: 5/5, DF 5/5, PF 5/5.  LLE: 5/5, DF 5/5, PF 5/5.   -  pronator drift. negative  -  Tone:  normal  -  Sensory:  Intact to light touch and pin prick.       Skin: Skin is warm and dry.   Two scabs noted with some redness over occiput area   Psychiatric: She has a normal mood and affect. Her behavior is normal.   Vitals reviewed.    NEUROLOGICAL EXAMINATION:     CRANIAL NERVES     CN III, IV, VI   Pupils are equal, round, and reactive to light.  Extraocular motions are normal.

## 2017-10-16 ENCOUNTER — PATIENT MESSAGE (OUTPATIENT)
Dept: NEUROLOGY | Facility: CLINIC | Age: 72
End: 2017-10-16

## 2017-10-16 LAB
ANION GAP SERPL CALC-SCNC: 7 MMOL/L
BASOPHILS # BLD AUTO: 0.03 K/UL
BASOPHILS NFR BLD: 0.5 %
BUN SERPL-MCNC: 19 MG/DL
CALCIUM SERPL-MCNC: 8.5 MG/DL
CHLORIDE SERPL-SCNC: 115 MMOL/L
CO2 SERPL-SCNC: 22 MMOL/L
CREAT SERPL-MCNC: 1.6 MG/DL
DIFFERENTIAL METHOD: ABNORMAL
EOSINOPHIL # BLD AUTO: 0.3 K/UL
EOSINOPHIL NFR BLD: 4 %
ERYTHROCYTE [DISTWIDTH] IN BLOOD BY AUTOMATED COUNT: 13.7 %
EST. GFR  (AFRICAN AMERICAN): 36.6 ML/MIN/1.73 M^2
EST. GFR  (NON AFRICAN AMERICAN): 31.7 ML/MIN/1.73 M^2
GLUCOSE SERPL-MCNC: 85 MG/DL
HCT VFR BLD AUTO: 32.7 %
HGB BLD-MCNC: 9.9 G/DL
LYMPHOCYTES # BLD AUTO: 1.6 K/UL
LYMPHOCYTES NFR BLD: 24 %
MAGNESIUM SERPL-MCNC: 1.6 MG/DL
MCH RBC QN AUTO: 28.4 PG
MCHC RBC AUTO-ENTMCNC: 30.3 G/DL
MCV RBC AUTO: 94 FL
MONOCYTES # BLD AUTO: 0.8 K/UL
MONOCYTES NFR BLD: 11.5 %
NEUTROPHILS # BLD AUTO: 3.9 K/UL
NEUTROPHILS NFR BLD: 60 %
PLATELET # BLD AUTO: 314 K/UL
PMV BLD AUTO: 10 FL
POTASSIUM SERPL-SCNC: 4.2 MMOL/L
RBC # BLD AUTO: 3.48 M/UL
SODIUM SERPL-SCNC: 144 MMOL/L
WBC # BLD AUTO: 6.5 K/UL

## 2017-10-16 PROCEDURE — 83735 ASSAY OF MAGNESIUM: CPT

## 2017-10-16 PROCEDURE — 97530 THERAPEUTIC ACTIVITIES: CPT

## 2017-10-16 PROCEDURE — 36415 COLL VENOUS BLD VENIPUNCTURE: CPT

## 2017-10-16 PROCEDURE — 25000003 PHARM REV CODE 250: Performed by: PHYSICAL MEDICINE & REHABILITATION

## 2017-10-16 PROCEDURE — 97116 GAIT TRAINING THERAPY: CPT

## 2017-10-16 PROCEDURE — 92507 TX SP LANG VOICE COMM INDIV: CPT

## 2017-10-16 PROCEDURE — 12800000 HC REHAB SEMI-PRIVATE ROOM

## 2017-10-16 PROCEDURE — 99232 SBSQ HOSP IP/OBS MODERATE 35: CPT | Mod: ,,, | Performed by: PHYSICAL MEDICINE & REHABILITATION

## 2017-10-16 PROCEDURE — 63600175 PHARM REV CODE 636 W HCPCS: Performed by: PHYSICAL MEDICINE & REHABILITATION

## 2017-10-16 PROCEDURE — 85025 COMPLETE CBC W/AUTO DIFF WBC: CPT

## 2017-10-16 PROCEDURE — 80048 BASIC METABOLIC PNL TOTAL CA: CPT

## 2017-10-16 PROCEDURE — 97110 THERAPEUTIC EXERCISES: CPT

## 2017-10-16 PROCEDURE — 97112 NEUROMUSCULAR REEDUCATION: CPT

## 2017-10-16 PROCEDURE — 97150 GROUP THERAPEUTIC PROCEDURES: CPT

## 2017-10-16 PROCEDURE — 25000003 PHARM REV CODE 250: Performed by: STUDENT IN AN ORGANIZED HEALTH CARE EDUCATION/TRAINING PROGRAM

## 2017-10-16 RX ADMIN — PANCRELIPASE 1 CAPSULE: 60000; 12000; 38000 CAPSULE, DELAYED RELEASE PELLETS ORAL at 05:10

## 2017-10-16 RX ADMIN — Medication 400 MG: at 08:10

## 2017-10-16 RX ADMIN — ATORVASTATIN CALCIUM 40 MG: 20 TABLET, FILM COATED ORAL at 07:10

## 2017-10-16 RX ADMIN — MICONAZOLE NITRATE: 20 CREAM TOPICAL at 08:10

## 2017-10-16 RX ADMIN — LEVETIRACETAM 750 MG: 750 TABLET ORAL at 08:10

## 2017-10-16 RX ADMIN — LEVETIRACETAM 750 MG: 750 TABLET ORAL at 07:10

## 2017-10-16 RX ADMIN — RIVAROXABAN 20 MG: 20 TABLET, FILM COATED ORAL at 05:10

## 2017-10-16 RX ADMIN — DRONABINOL 5 MG: 2.5 CAPSULE ORAL at 04:10

## 2017-10-16 RX ADMIN — AMLODIPINE BESYLATE 5 MG: 5 TABLET ORAL at 07:10

## 2017-10-16 RX ADMIN — METOPROLOL SUCCINATE 50 MG: 50 TABLET, EXTENDED RELEASE ORAL at 08:10

## 2017-10-16 RX ADMIN — LORAZEPAM 1 MG: 1 TABLET ORAL at 08:10

## 2017-10-16 RX ADMIN — CHLORHEXIDINE GLUCONATE 15 ML: 1.2 RINSE ORAL at 01:10

## 2017-10-16 RX ADMIN — BENZOCAINE: 100 GEL TOPICAL at 08:10

## 2017-10-16 RX ADMIN — AMITRIPTYLINE HYDROCHLORIDE 50 MG: 25 TABLET, FILM COATED ORAL at 08:10

## 2017-10-16 RX ADMIN — ERLOTINIB HYDROCHLORIDE 150 MG: 150 TABLET, FILM COATED ORAL at 08:10

## 2017-10-16 RX ADMIN — DRONABINOL 5 MG: 2.5 CAPSULE ORAL at 05:10

## 2017-10-16 RX ADMIN — Medication 400 MG: at 07:10

## 2017-10-16 RX ADMIN — CHLORHEXIDINE GLUCONATE 15 ML: 1.2 RINSE ORAL at 08:10

## 2017-10-16 RX ADMIN — PANCRELIPASE 1 CAPSULE: 60000; 12000; 38000 CAPSULE, DELAYED RELEASE PELLETS ORAL at 12:10

## 2017-10-16 RX ADMIN — PANCRELIPASE 1 CAPSULE: 60000; 12000; 38000 CAPSULE, DELAYED RELEASE PELLETS ORAL at 08:10

## 2017-10-16 NOTE — PROGRESS NOTES
"Physical Therapy   Treatment/W/c Group    Naty St   MRN: [id number]                10/16/17 1415   PT Time Calculation   PT Start Time 1415   PT Stop Time 1500   PT Total Time (min) 45 min   Treatment   Treatment Type Treatment;Other (see comments)  (W/c Group)   PT/PTA PT   General   PT Received On 10/16/17   Family/Caregiver Present Yes  ()   Patient Found (position) Seated in wheelchair;Other (comment)  (in room)   Pt found with (posey belt)   Precautions   General Precautions fall   Visual/Auditory Vision impaired;Other (see comments)  (glasses)   Subjective   Patient states " is it time to go?"   Pain/Comfort   Pain Rating 1 no pain   Pain Rating Post-Intervention 1 no pain   Activity Tolerance   Activity Tolerance Patient tolerated treatment well   After Treatment   Patient Position After Treatment Seated in wheelchair;Other (comment)  (posey belt in place)   Patient after treatment left (pt directly to OT session after group)   Assessment   Prognosis Good   Plan   Planned Therapy Intervention Continue with current plan   Therapy Frequency 2 times/day;daily;Monday-Friday;Saturday or Sunday   Physical Therapy Follow-up   PT Follow-up? Yes   PT - Next Visit Date 10/16/17   Treatment/Billable Minutes   Therapeutic Activity Group 45   Total Time 45         Patient participated in a wheelchair management and mobility 45 min group session.  The patients were involved in group activities with emphasis on education, patients propulsion of wheelchair, management of all wheelchair parts and endurance building.  Pressure relief taught via lateral weight shifting and pt practiced locking brakes and swinging footrests away. The patient performed six minutes of a wheelchair endurance activity with a distance of _~200 feet___ with ___min A( pt uses B UE and LE) . Patient propelled wheelchair weaving in and out of canes for ~100__ feet x 2 trials with __min A.  Patient also practiced parallel parking x 2 " trials while  in the wheelchair with __min A.  Pt also propels w/c over carpet x ~84 feet with min A. These activities were performed in a group setting to encourage increased participation with peers and social interaction.          Ruddy Sanchez, PT 10/16/2017

## 2017-10-16 NOTE — PROGRESS NOTES
"Speech Therapy   Treatment    [address]   MRN: [id number]        10/16/17 1300   Speech Time Calculation   Speech Start Time 1300   Speech Stop Time 1357   Speech Total (min) 57 min   General Information   SLP Treatment Date 10/16/17   General Observations Pt seen sitting upright in w/c at bedside with spouse present.    General Precautions fall   Visual/Auditory Vision impaired  (glasses)   Subjective   Patient states Pt stated "I'm ready to get out of here if people will be fast in/out here. I need some campassion and effort from the people here. "   Pain/Comfort   Pain Rating no pain       SLP Cognitive Treatment   Treatment Detail (SLP Cognitive Treatment) Upon entering room, pt and pt spouse reporting dissatisfaction with schedule of therapy today. Pt stated "My morning was wasted. I did nothing." Pt also with c/o "people are so fast in/out here." Education provided at length with verbal encouragement of compassionate staff and full participation in program. Pt and spouse discussed pt's request for IP rehab program at length. This information passed on to Kian of nursing staff via face to face, to which they verbalized understanding and will follow up. Within report of information, pt required moderate verbal and visual cues for word finding skills and topic maintenance. Pt agreeable to complete program, but requesting to have same therapist each day. Pt completed recall task of weekend events and treatment session completed last week, given min verbal cues with 80% acc indly, then increased to 100% acc. Pt continues to require 24 hour supervision at this time with deficits of safety, reasoning, and problem solving skills.    Assessment   SLP Diagnosis moderate-severe cog-ling deficits; moderate dysarthria   Prognosis Good   Problem List decreased attention skills; imprecise articulation; decreased word selection; word finding deficits; decreased thuoght organization; poor problem solving " skills   Session Assessment progressing toward goals   Level of Motivation/Participation good   Discharge Recommendations   Discharge Facility/Level Of Care Needs home health speech therapy   Plan   Plan Continue with current plan   Treatment/Billable Minutes   Speech Therapy Individual 57   Total Time 57     FIM Scores  Comprehension:4  Expression: 3  Social Interaction:4  Problem Solving:3  Memory: 2     Comprehension:  Complex= humor, finances, rationale for medical treatment(hip precautions, pressure relief)  Basic= pain, hunger, thirst, bathroom needs, cold, nutrition, sleep     Understands basic 75-89%- may need words repeated (4)     Expression:  Expresses basic 50-74% of time - Needs to repeat parts of sentences  (3)     Social Interaction:  Interacts appropriately 75-89% of time - needs redirection for appropriate language or to initiate interaction  (4)     Problem Solving:  Solves basic problems 50-74% of the time  (3)     Memory:  Recognizes, recalls, or executes 25-49% of time 1 step of 2 step request  (2)     Jewels Amado CCC-SLP  10/16/2017

## 2017-10-16 NOTE — PROGRESS NOTES
"Occupational Therapy   PM Treatment    [address]   MRN: [id number]      10/16/17 1500   OT Time Calculation   OT Start Time 1500   OT Stop Time 1545   OT Total Time (min) 45 min   General   OT Date of Treatment 10/16/17   Family/Caregiver Present ( and son)   Patient Found (position) Seated in wheelchair   Precautions   General Precautions fall   Subjective   Patient states "I'm so used to going fast"   Pain/Comfort   Pain Rating no pain   Bed Mobility   Supine to Sit Supervision   Sit to Supine Supervision   Transfers   Transfer yes   Sit to Stand   Sit <> Stand Assistance Contact Guard Assistance   Sit <> Stand Assistive Device Rolling Walker   Trials/Comments cues for hand placement   Stand to Sit   Assistance Contact Guard Assistance   Assistive Device Rolling Walker   Trials/Comments cues for hand placement   LE Dressing   LE Dressing Level of Assistance Minimum assistance   Sock Level of Assistance Supervision   Shoe Level of Assistance Supervision   LE Dressing Where Assessed (seated on EOM)   LE Dressing Comments steadying assist    Additional Activities:      - Pt tf'd to EOM with RW and Min-Mod A for steadying with cues and physical A to manage RW, decrease speed, and cues for foot placement.    - Pt stood at countertop, tf'd grocery items from overhead cabinet onto countertop with R hand only. Pt required cues throughout task as she attempted to use L hand at times instead of right.    - Pt folded laundry while seated at EOM, then placed clothing items into hamper in standing, requiring pt to shift weight from L side to R to set down into basket. Pt requires close CGA-Min A to complete throughout for balance.     - Therex:    Chest press x 4# dowel x 20 reps x 2 sets in supine on mat                      Bridges in supine on mat x 20 reps                       Core strengthening exercise using swiss ball in supine (pt rolling ball into and away from chest using BLEs)     Activity " Tolerance   Activity Tolerance (pt tolerated fair)   Medical Staff Made Aware NSG   After Treatment   Patient Position After Treatment Seated in wheelchair   Patient after treatment left (waiting in gym for PT)   Assessment   Prognosis Good   Problem List Decreased Self Care skills;Decreased upper extremity strength;Decreased safe judgment during ADL;Decreased endurance;Decreased gross motor control;Decreased functional mobility;Decreased fine motor control;Decreased IADLs;Decreased Function of right upper extremity;Decreased trunk control for functional activities;Decreased cognition   Assessment Pt tolerated session fairly well, still requires cues and close Min-Mod A for steadying during mobility activities. Pt demo impulsive behavior at times, standing unexpectedly from mat with poor safety awareness several times in session.     Level of Motivation/Participation good   Discharge Recommendations   Equipment Needed After Discharge walker, arin   Discharge Facility/Level Of Care Needs home health OT   Plan   Plan Continue with current plan   Therapy Frequency 2 times/day;Monday-Friday;Saturday or Sunday   Occupational Therapy Follow-up   OT Follow-up? Yes   Treatment/Billable Minutes   Therapeutic Activity 30   Therapeutic Exercise 15   Total Time 45     LEOBARDO Bustillos   10/16/2017

## 2017-10-16 NOTE — PROGRESS NOTES
"Physical Therapy   Treatment    [address]   MRN: [id number]          10/16/17 1550   PT Time Calculation   PT Start Time 1550   PT Stop Time 1636   PT Total Time (min) 46 min   Treatment   Treatment Type Treatment   PT/PTA PT   General   PT Received On 10/16/17   Family/Caregiver Present Yes  (, sons, daughter-in-law)   Patient Found (position) Seated in wheelchair  (from OT)   Pt found with (posey belt)   Precautions   General Precautions fall   Visual/Auditory Vision impaired  (wears glasses)   Subjective   Patient states "Let's go"   Pain/Comfort   Pain Rating 1 no pain   Pain Rating Post-Intervention 1 no pain   Bed Mobility   Bed Mobility no   Transfers   Transfer yes   Sit to Stand   Sit <> Stand Assistance Minimum Assistance;Contact Guard Assistance   Sit <> Stand Assistive Device No Assistive Device   Trials/Comments multiple trials~ cues for hand placement and scooting   Stand to Sit   Assistance Contact Guard Assistance;Minimum Assistance   Assistive Device No Assistive Device   Trials/Comments multiple trials~ cues for hand placement and forward trunk lean   Wheelchair Activities   Propulsion No  (pt performed in w/c group prior to this session)   Gait   Gait Yes   Weight Bearing Status full   Gait 1   Surface 1 Level tile   Gait Assistive Device Rolling walker  (pushing w/c)   Other Apparatus 1 (gait belt)   Assistance 1 Minimum assistance;Contact Guard Assistance   Gait Distance Pt ambulated ~200 ft w/ RW w/ CGA + min A for 2 episodes of LOB + cueing on management of RW; pt ambulated ~200 ft pushing w/c( with PT or SPT seated in w/c to simulate Neuro-Esther LE progression # 8) w/ latex-free gloves donned w/ CGA   Gait Pattern swing-through gait   Gait Deviation(s) increased time in double stance;decreased velocity of limb motion;forward lean;decreased leila;decreased step length;decreased stride length;decreased weight-shifting ability;lateral lean   Impairments Contributing to Gait " Deviations impaired balance;impaired coordination;impaired motor control;impaired postural control;other (see comments)  (decreased attention to task)   Stairs   Stairs Yes   Stairs/Curb Step   Rails 1 Bilateral   Device 1 No device   Other Apparatus 1 (gait belt)   Assistance 1 Contact guard;Minimum assistance   Comment/# Steps 1 pt ascends/descends 12 steps w/ B handrail and CGA ascending and [place]/CGA for descending  (cues for hand placement)   Balance   Balance Yes   High Level Ambulation   Side Stepping Parallel bars   Side Stepping Comments Pt performed 3 reps side stepping length of bars( CGA)~ to  R and L; side stepping over dowels,  w/ CGA( R and L)   Comments Pt also performed 3 sets of reciprocal gait length of bars between dowels in parallel bars w/ CGA   Other Activities   Other Activities Other (Comment)   Comments Pt caught/threw beachball for ~3 min seated in w/c   Activity Tolerance   Activity Tolerance Patient tolerated treatment well   After Treatment   Patient Position After Treatment Seated in wheelchair   Patient after treatment left (brought to room by family)   Assessment   Prognosis Good   Problem List Decreased endurance;Impaired balance;Decreased mobility;Decreased coordination;Decreased safety awareness;Impaired vision;Decreased strength;Decreased cognition   Session Assessment progressing toward goals;increased distance in gait   Assessment Pt did well today, and was able to participate in full treatment session.  Pt required cueing throughout session to slow down movements and focus on task at hand.  Pt is distractible and shows impulsive behavior during transfers, but is able to be re-directed.  Pt was able to participate in high level ambulation exercises and had increased gait distance.  Pt had trouble managing RW secondary to ataxia and decreased postural control, but had increased stability while pushing weighted w/c for gait training.  Pt has good family support and continues to  benefit from inpatient rehab.   Level of Motivation/Participation good   Barriers to Discharge None   Discharge Recommendations   Equipment Needed After Discharge walker, rolling;wheelchair;other (see comments)  (tub bench????)   Discharge Facility/Level Of Care Needs outpatient PT   Plan   Planned Therapy Intervention Continue with current plan;Transfer training;Gait training;Balance Training;Neuromuscular Re-education;Motor Coordination Training;Postural Re-education;Wheelchair Management/Propulsion;Bed mobility training;ROM;Strengthening   Therapy Frequency 2 times/day;daily;Monday-Friday;Saturday or Sunday   Physical Therapy Follow-up   PT Follow-up? Yes   PT - Next Visit Date 10/17/17   Treatment/Billable Minutes   Gait training 18   Therapeutic Activity 10   Neuromuscular Re-education 18   Total Time 46         Dianna Frankel, SPT  10/16/2017          I certify that I was present in the room directing the student in service delivery and guided him/her using my skilled judgment. As the co-signing therapist, I have reviewed the student's documentation and am responsible for the treatment, assessment and plan.        Ruddy Sanchez, PT

## 2017-10-16 NOTE — PLAN OF CARE
Problem: Patient Care Overview  Goal: Plan of Care Review  Outcome: Ongoing (interventions implemented as appropriate)  Patient has denied any pain this shift. Patient is up in chair with assist with no falls or injuries noted. Patient is stable with no distress noted. Plan of care explained to patient and verbal acknowledgement of understanding from patient. Patient is resting with call light in reach, bed in low,locked position with bed alarm set. Will continue to monitor.

## 2017-10-16 NOTE — PROGRESS NOTES
Occupational Therapy   FIM Scores    [address]   MRN: [id number]      10/16/17 1500   Self Care   Dressing-Lower 4     LEOBARDO Bustillos   10/16/2017

## 2017-10-16 NOTE — PROGRESS NOTES
Physical Therapy   FIM scores    [address]   MRN: [id number]            10/16/17 1550   Transfers   Bed/Chair/WC 4   Locomotion   Distance Walked 3   Distance Wheelchair 3   Walk 4   Wheelchair 4   Mode C   Stairs 4       DYLON Muñoz  10/16/2017          I certify that I was present in the room directing the student in service delivery and guided him/her using my skilled judgment. As the co-signing therapist, I have reviewed the student's documentation and am responsible for the treatment, assessment and plan.        Ruddy Sanchez, PT

## 2017-10-16 NOTE — ASSESSMENT & PLAN NOTE
PT/OT/SLP    Transfers   Bed/Chair/WC 4   Self Care   Grooming 5       Comprehension:4  Expression: 3  Social Interaction:4  Problem Solving:3  Memory: 2

## 2017-10-16 NOTE — PROGRESS NOTES
Recreational Therapy   Treatment    [address]  MRN: [id number]         10/16/17 1000   Rec Therapy Time Calculation   Rec Start Time 1000   Rec Stop Time 1045   Rec Total Time (min) 45 min   Subjective   Patient states I ok   Precautions   General Precautions fall;vision impaired   Assessment   Mobility manual wheelchair   Speech/Communication (fair)   Attendance   Activity Puzzles   Participation Active participation   Assessment   Assessment Pt had difficulty completing puzzle activity without assistance.  No pain complaints voiced, barrier: decrease cognition.   Plan   Planned Therapy Intervention Alter current plan   PT Frequency Minimum of 1 visit per week   Time   Treatment time 3 units       BRENT KentS,  10/16/2017

## 2017-10-16 NOTE — SUBJECTIVE & OBJECTIVE
Interval History: Pt without new c/o's.  I have reviewed the HPI and PMFSH and there are no changes from admission.       Scheduled Medications:    amitriptyline  50 mg Oral QHS    amlodipine  5 mg Oral Daily    atorvastatin  40 mg Oral Daily    benzocaine   Mouth/Throat BID    chlorhexidine  15 mL Mouth/Throat BID    dronabinol  5 mg Oral BID AC    erlotinib  150 mg Oral QHS    levetiracetam  750 mg Oral BID    lipase-protease-amylase 12,000-38,000-60,000 units  1 capsule Oral TID WM    magnesium oxide  400 mg Oral BID    metoprolol succinate  50 mg Oral Daily    miconazole   Topical (Top) BID    rivaroxaban  20 mg Oral Daily with dinner    senna-docusate 8.6-50 mg  1 tablet Oral BID       PRN Medications: bisacodyl, influenza, lorazepam, magnesium hydroxide 400 mg/5 ml, ondansetron, pneumoc 13-marcus conj-dip cr(PF), promethazine, ramelteon    Review of Systems   Constitutional: Negative for chills, fatigue and fever.   HENT: Negative for trouble swallowing and voice change.    Eyes: Negative for photophobia and visual disturbance.   Respiratory: Negative for cough, shortness of breath and wheezing.    Cardiovascular: Negative for chest pain and palpitations.   Gastrointestinal: Negative for abdominal distention and nausea.   Genitourinary: Negative for difficulty urinating and flank pain.   Musculoskeletal: Positive for gait problem. Negative for arthralgias.   Skin: Negative for color change and rash.   Neurological: Positive for speech difficulty and weakness. Negative for facial asymmetry, numbness and headaches.   Psychiatric/Behavioral: Negative for agitation and confusion.     Objective:     Vital Signs (Most Recent):  Temp: 98.3 °F (36.8 °C) (10/16/17 0703)  Pulse: 67 (10/16/17 0703)  Resp: 18 (10/16/17 0703)  BP: 123/68 (10/16/17 0703)  SpO2: 95 % (10/16/17 0703)    Vital Signs (24h Range):  Temp:  [98.3 °F (36.8 °C)-98.7 °F (37.1 °C)] 98.3 °F (36.8 °C)  Pulse:  [67-78] 67  Resp:  [18]  18  SpO2:  [95 %-97 %] 95 %  BP: (123-145)/(61-68) 123/68     Physical Exam   Constitutional: She appears well-developed and well-nourished.   HENT:   Head: Normocephalic and atraumatic.   Sores on superior palate   Eyes: EOM are normal. Pupils are equal, round, and reactive to light.   Neck: Normal range of motion.   Cardiovascular: Normal rate and regular rhythm.    Pulmonary/Chest: Effort normal. No respiratory distress.   Abdominal: Soft. There is no tenderness.   Musculoskeletal: Normal range of motion. She exhibits no deformity.   Neurological:   -  Mental Status:  AAOx3.  Follows commands.  Answers correct age and .  Recent and remote memory intact.  No neglect.    -  Speech and language:  - aphasia + dysarthria.    -  Coordination:  Finger to nose exam:  RUE mild dysmetria, LUE dysmetria.   -  Motor:  RUE: 4/5, 4/5 .  LUE: 5/5, 5/5 .  RLE: 5/5, DF 5/5, PF 5/5.  LLE: 5/5, DF 5/5, PF 5/5.   -  pronator drift. negative  -  Tone:  normal  -  Sensory:  Intact to light touch and pin prick.       Skin: Skin is warm and dry.   Two scabs noted with some redness over occiput area   Psychiatric: She has a normal mood and affect. Her behavior is normal.   Vitals reviewed.    NEUROLOGICAL EXAMINATION:     CRANIAL NERVES     CN III, IV, VI   Pupils are equal, round, and reactive to light.  Extraocular motions are normal.

## 2017-10-17 LAB
ANION GAP SERPL CALC-SCNC: 8 MMOL/L
BUN SERPL-MCNC: 19 MG/DL
CALCIUM SERPL-MCNC: 8.4 MG/DL
CHLORIDE SERPL-SCNC: 115 MMOL/L
CO2 SERPL-SCNC: 21 MMOL/L
CREAT SERPL-MCNC: 1.6 MG/DL
EST. GFR  (AFRICAN AMERICAN): 36.6 ML/MIN/1.73 M^2
EST. GFR  (NON AFRICAN AMERICAN): 31.7 ML/MIN/1.73 M^2
GLUCOSE SERPL-MCNC: 83 MG/DL
POTASSIUM SERPL-SCNC: 4.1 MMOL/L
SODIUM SERPL-SCNC: 144 MMOL/L

## 2017-10-17 PROCEDURE — 36415 COLL VENOUS BLD VENIPUNCTURE: CPT

## 2017-10-17 PROCEDURE — 97542 WHEELCHAIR MNGMENT TRAINING: CPT

## 2017-10-17 PROCEDURE — 97150 GROUP THERAPEUTIC PROCEDURES: CPT

## 2017-10-17 PROCEDURE — 97535 SELF CARE MNGMENT TRAINING: CPT

## 2017-10-17 PROCEDURE — 25000003 PHARM REV CODE 250: Performed by: STUDENT IN AN ORGANIZED HEALTH CARE EDUCATION/TRAINING PROGRAM

## 2017-10-17 PROCEDURE — 97803 MED NUTRITION INDIV SUBSEQ: CPT | Performed by: NUTRITIONIST

## 2017-10-17 PROCEDURE — 97530 THERAPEUTIC ACTIVITIES: CPT

## 2017-10-17 PROCEDURE — 97116 GAIT TRAINING THERAPY: CPT

## 2017-10-17 PROCEDURE — 25000003 PHARM REV CODE 250: Performed by: PHYSICAL MEDICINE & REHABILITATION

## 2017-10-17 PROCEDURE — 63600175 PHARM REV CODE 636 W HCPCS: Performed by: PHYSICAL MEDICINE & REHABILITATION

## 2017-10-17 PROCEDURE — 12800000 HC REHAB SEMI-PRIVATE ROOM

## 2017-10-17 PROCEDURE — 99232 SBSQ HOSP IP/OBS MODERATE 35: CPT | Mod: ,,, | Performed by: PHYSICAL MEDICINE & REHABILITATION

## 2017-10-17 PROCEDURE — 80048 BASIC METABOLIC PNL TOTAL CA: CPT

## 2017-10-17 PROCEDURE — 92507 TX SP LANG VOICE COMM INDIV: CPT

## 2017-10-17 RX ADMIN — ATORVASTATIN CALCIUM 40 MG: 20 TABLET, FILM COATED ORAL at 08:10

## 2017-10-17 RX ADMIN — CHLORHEXIDINE GLUCONATE 15 ML: 1.2 RINSE ORAL at 08:10

## 2017-10-17 RX ADMIN — LEVETIRACETAM 750 MG: 750 TABLET ORAL at 08:10

## 2017-10-17 RX ADMIN — PANCRELIPASE 1 CAPSULE: 60000; 12000; 38000 CAPSULE, DELAYED RELEASE PELLETS ORAL at 08:10

## 2017-10-17 RX ADMIN — MICONAZOLE NITRATE: 20 CREAM TOPICAL at 08:10

## 2017-10-17 RX ADMIN — AMLODIPINE BESYLATE 5 MG: 5 TABLET ORAL at 08:10

## 2017-10-17 RX ADMIN — ERLOTINIB HYDROCHLORIDE 150 MG: 150 TABLET, FILM COATED ORAL at 08:10

## 2017-10-17 RX ADMIN — LORAZEPAM 1 MG: 1 TABLET ORAL at 08:10

## 2017-10-17 RX ADMIN — PANCRELIPASE 1 CAPSULE: 60000; 12000; 38000 CAPSULE, DELAYED RELEASE PELLETS ORAL at 12:10

## 2017-10-17 RX ADMIN — Medication 400 MG: at 08:10

## 2017-10-17 RX ADMIN — RIVAROXABAN 20 MG: 20 TABLET, FILM COATED ORAL at 05:10

## 2017-10-17 RX ADMIN — AMITRIPTYLINE HYDROCHLORIDE 50 MG: 25 TABLET, FILM COATED ORAL at 08:10

## 2017-10-17 RX ADMIN — METOPROLOL SUCCINATE 50 MG: 50 TABLET, EXTENDED RELEASE ORAL at 08:10

## 2017-10-17 RX ADMIN — DRONABINOL 5 MG: 2.5 CAPSULE ORAL at 04:10

## 2017-10-17 RX ADMIN — DRONABINOL 5 MG: 2.5 CAPSULE ORAL at 06:10

## 2017-10-17 RX ADMIN — PANCRELIPASE 1 CAPSULE: 60000; 12000; 38000 CAPSULE, DELAYED RELEASE PELLETS ORAL at 05:10

## 2017-10-17 NOTE — PROGRESS NOTES
"Occupational Therapy   FMS Group    [address]  MRN: [id number]  Room/Bed: E278/E278 A       10/17/17 1415   OT Time Calculation   OT Start Time 1415   OT Stop Time 1500   OT Total Time (min) 45 min   General   OT Date of Treatment 10/17/17   Patient Found (position) Seated in wheelchair   Precautions   General Precautions fall   Subjective   Patient states "My hands are working fine."   Pain/Comfort   Pain Rating no pain   OT Therapeutic Groups   Other Pt. Participated in 45 minute FM coordination Group. The activity was performed to increase UE strength, coordination, FM manipulation, eye-hand coordination. Pt. Performed FM coordination activity with grooved pegboard, small snap beads, lacing boards creating loop and running stitches to increased functional use of RUE. Pt was able to complete tasks with Min (A). Pt demonstrated increased [place] with FM tasks. These activities were performed in a group setting to encourage increased participation with peers and social interaction skills.    Occupational Therapy Follow-up   OT Follow-up? Yes   Treatment/Billable Minutes   Therapeutic Group 45   Total Time 45       LEOBARDO Hooker  10/17/2017    LEGEND:   CGA: Contact Guard Assist   EOB: Edge of Bed   HHA: Hand Held Assist   HOB: Head of Bed   (I): Independent-patient performs task in a timely manner   Max (A): Maximal Assist-patient performs 25-49% of task   Min (A): Minimal Assist- patient performs 75% or more of task   Mod (A): Moderate Assist- patient performs 50-74% of task   NA: Not applicable   NT: Not tested   OOB: Out of Bed   PTA: Prior to admit   QC: Quad Cane   RW: Rolling Walker   (S): Supervision- patient requires cues, coaxing, prompting   SBA: Stand By Assist   SC: Straight Cane   SW: Standard Walker   TBA: To be assessed   Total (A): Total Assist- patient performs less than 25% of task   WC: Wheelchair   WFL: Within Functional Limits   WNL: Within Normal Limits    "

## 2017-10-17 NOTE — PROGRESS NOTES
[facility]  Adult Nutrition  Progress Note     SUMMARY      Recommendations     Recommendation/Intervention:   1.  Continue Rx diet  2.  Monitor PO intake   3.  RD to follow    Goals: PO to meet 85% of EEN/EPN  Nutrition Goal Status: ongoing   Communication of RD Recs: discussed on rounds     Continuum of Care Plan           Reason for Assessment     Reason for Assessment: RD follow up  Diagnosis:  (CVA)  Relevent Medical History: Lung cancer with current chemo therapy, HTN, seizures, CKD 3   Interdisciplinary Rounds: attended     General Information Comments: pt using Marinol at home brought to MD's attention     Nutrition Discharge Planning: DC on Regular diet     Nutrition Prescription Ordered     Current Diet Order: Regular              Oral Nutrition Supplement: Boost plus vanilla      Evaluation of Received Nutrients/Fluid Intake            Energy Calories Required:  meeting needs            Protein Required:  meeting needs                Fluid Required:t meeting needs      Comments: pt has good PO intake consuming 100% of meals     % Intake of Estimated Energy Needs:  %  % Meal Intake: 75%      Nutrition Risk Screen     Nutrition Risk Screen: no indicators present     Nutrition/Diet History     Patient Reported Diet/Restrictions/Preferences: general  Typical Food/Fluid Intake: good po prior to admit, po currently averaging 70%  Food Preferences: No cultural or Latter-day food preferences        Factors Affecting Nutritional Intake: depression, impaired cognitive status/motor control                 Labs/Tests/Procedures/Meds        Pertinent Labs Reviewed: reviewed, pertinent     Pertinent Medications Reviewed: reviewed, pertinent  Pertinent Medications Comments: Mg, statin, pancreatic enzymes, senna-docusate, dronabinol     Physical Findings     Overall Physical Appearance: weak  Tubes:  (-)  Oral/Mouth Cavity: dental applicance present (specify), tooth/teeth missing  Skin:  "intact     Anthropometrics     Height: 5' 5" (165.1 cm)  Weight Method: Standard Scale  Weight: 64.9 kg (143 lb 1.3 oz)     Ideal Body Weight (IBW), Female: 125 lb     % Ideal Body Weight, Female (lb): 114.46 lb  BMI (Calculated): 23.9  BMI Grade: 18.5-24.9 - normal       Estimated/Assessed Needs     Weight Used For Calorie Calculations: 64 kg (141 lb 1.5 oz)    Energy Calorie Requirements (kcal): 8563-1121 (25-30 kcal/kg)      Weight Used For Protein Calculations: 64 kg (141 lb 1.5 oz)  Protein Requirements: 63-83g (1.0-1.3g/kg)     Fluid Requirements (mL): 1600  Fluid Need Method: RDA Method     Assessment and Plan     Monitor and Evaluation  Food and Nutrient Intake: food and beverage intake  Food and Nutrient Adminstration: diet order  Physical Activity and Function: nutrition-related ADLs and IADLs  Anthropometric Measurements: weight change  Biochemical Data, Medical Tests and Procedures: gastrointestinal profile, electrolyte and renal panel, inflammatory profile  Nutrition-Focused Physical Findings: overall appearance  Nutrition Risk  Level of Risk:  (follow two times per month)  Nutrition Follow-Up  RD Follow-up?: Yes          "

## 2017-10-17 NOTE — ASSESSMENT & PLAN NOTE
PT/OT/SLP    Transfers   Bed/Chair/WC 4   Toilet 4   Self Care   Eating 5   Bathing 4   Dressing-Upper 5   Dressing-Lower 4   Toileting 4     Comprehension:4  Expression: 3  Social Interaction:4  Problem Solving:3  Memory: 2

## 2017-10-17 NOTE — PROGRESS NOTES
"Physical Therapy   Treatment/Re-Assessment    Naty St   MRN: [id number]        10/17/17 1030   PT Time Calculation   PT Start Time 1030   PT Stop Time 1115   PT Total Time (min) 45 min   Treatment   Treatment Type Treatment;Other (see comments)  (Re-Assessment)   PT/PTA PT   General   PT Received On 10/17/17   Family/Caregiver Present No   Patient Found (position) Seated in wheelchair  (in room )   Pt found with (posey belt )   Precautions   General Precautions fall   Visual/Auditory Vision impaired  (wear glasses)   Subjective   Patient states "I'm good"   Pain/Comfort   Pain Rating 1 no pain   Pain Rating Post-Intervention 1 no pain   Bed Mobility   Bed Mobility yes   Rolling/Turning to Left Modified independent  (multiple trials on mat)   Rolling/Turning Right Modified independent  (multiple trials on mat)   Scooting/Bridging Supervision   Supine to Sit Modified Independent  (3 trials on mat)   Sit to Supine Modified Independent  (3 trials on mat)   Transfers   Transfer yes   Sit to Stand   Sit <> Stand Assistance Contact Guard Assistance   Sit <> Stand Assistive Device No Assistive Device   Trials/Comments multiple trials~ cues for forward trunk lean and hand placement   Stand to Sit   Assistance Contact Guard Assistance   Assistive Device No Assistive Device   Trials/Comments multiple trials~ cues for forward trunk lean and hand placement   Chair to Mat   Chair<> Mat Technique Stand Pivot   Chair<>Mat Assistance Contact Guard Assistance   Chair <> Mat Assistive Device No Assistive Device   Trials/Comments 1 trial~ cues for forward trunk lean   Mat to Chair   Technique Stand Pivot   Assistance Contact Guard Assistance   Assistive Device No Assistive Device   Trials/Comments 1 trial~ cues for forward trunk lean   Tub Bench Transfer   Technique Other (see comments)   Assistance Contact Guard Assistance   Assistive Device No Assistive Device   Trials/Comments 1 trial~ pt steps over tub w/ hands on wall " "and sits on tub bench.  Pt stands from tub bench and steps over tub w/ hands on wall and  pivots to w/c all w/ CGA    Wheelchair Activities   Propulsion Yes   Propulsion Type 1 Manual   Level 1 Level tile   Method 1 Right upper extremity;Left upper extremity;Right lower extremity;Left lower extremity   Level of Assistance 1 Stand by assistsance;Minimum assistance  (required min A to roll over floor outlet)   Description/ Details 1 pt propels 101 ft w/ SBA; min A to roll over outlet; cues for staying on task and hand/foot placement during turns  (w/c cushion removed for above activity)   Gait   Gait Yes   Weight Bearing Status full   Gait 1   Surface 1 Level tile   Gait Assistive Device Rolling walker   Other Apparatus 1 Other (Comment)  (gait belt)   Assistance 1 Minimum assistance;Contact Guard Assistance   Gait Distance Pt ambulates ~200 ft w/ RW and CGA for straight path and min A for maintaining balance during turns   Gait Pattern swing-through gait   Gait Deviation(s) decreased leila;increased time in double stance;decreased velocity of limb motion;decreased step length;decreased stride length;decreased weight-shifting ability;lateral lean;forward lean   Impairments Contributing to Gait Deviations impaired balance;impaired coordination;impaired motor control;impaired postural control;other (see comments)  (decreased attention to task)   Stairs   Stairs Yes   Stairs/Curb Step   Rails 1 Bilateral   Device 1 No device   Other Apparatus 1 (gait belt)   Assistance 1 Minimum assistance;Contact guard   Comment/# Steps 1 pt ascends/descends 12 steps w/ B handrail; CGA for ascend - min A for descend to maintain balance due to posterior trunk lean  (cues for hand placement during descend)   Stairs/ Curb Step  2   Rails 2 None (Comment)   Device 2 Rolling walker   Other Apparatus 2 Other (Comment)  (gait belt)   Assistance 2 Minimum assistance   Comment/# Steps 2 pt ascends and descends 4" curb x 2 trials w/ RW and min " A for balance/ technique  (cues for RW management)   Balance   Balance Yes   Static Standing Balance   Static Standing-Balance Support No upper extremity supported   Static Standing-Level of Assistance Minimum assistance   Static Standing-Comment/# of Minutes pt stands near edge of mat for 3:29 w/ min A + cues for forward trunk lean and to correct posterior drift   Other Activities   Other Activities Other (Comment)   Comments Pt caught/threw beach ball from w/c level w/ SPT for ~4 min; ball was thrown at varying speeds   Activity Tolerance   Activity Tolerance Patient tolerated treatment well   Other Comments   Comments pt performs stand pivot transfers w/c<> 3 in 1 commode with CGA and cues for technique   After Treatment   Patient Position After Treatment Seated in wheelchair  (handed off to speech therapy; posey belt in place)   Assessment   Prognosis Good   Problem List Decreased strength;Decreased endurance;Impaired balance;Decreased mobility;Decreased coordination;Decreased safety awareness;Decreased cognition;Impaired vision   Session Assessment progressing toward goals   Assessment Pt did well today, but required constant cueing to focus on tasks and slow down movements.  Pt required less assistance during bed mobility, sit <> stand, and tub bench transfer.  Pt was also able to better manage the RW during ambulation, but still requires slight assistance to maintain balance during turns.  Pt has met majority of short term goals and appears to be on pace to meet LTGs in established time frame.  Pt continues to slowly progress w/ mobility skills and benefits from inpatient rehab.   Level of Motivation/Participation Good   Barriers to Discharge None   Barriers to Discharge Comments Has great family support   Short Term Goals   Logroll - Met/Not Met Met   Transfer Sit to stand - Met/Not Met Met   Transfer Bed/Chair - Met/Not Met Met   Ambulate - Met/Not Met Met   Go Up/Down Stairs - Progress CGA ascending; min A  descending   Go Up/Down Stairs - Met/Not Met Not met   Go Up/Down Curb- Met/Not Met Met   Stand - Progress able to stand 3-5 min w/ min A    Stand - Met/Not Met Not Met   Tolerate Exercise - Met/Not Met Met   Propel Wheelchair - Progress able to propel  ft w/ min A/SBA   Propel Wheelchair - Met/Not Met Not met   Other Goal Met   Other Goal 2 Met   Long Term Goals   Pt Will Go Up / Down Stairs New goal;1 flight;With minimal assist;With contact guard assist;With 1 Rail   Pt Will Stand Revised goal;3-5 min;With contact guard assist;With stand by assist;Without assitive device   Pt Will Propel Wheelchair Revised goal;> 150 feet;With stand by assist;With (B) UE;With (B) LE   Discharge Recommendations   Equipment Needed After Discharge walker, rolling;wheelchair;other (see comments)  (tub bench)   Discharge Facility/Level Of Care Needs outpatient PT   Plan   Planned Therapy Intervention Continue with current plan;Bed mobility training;Transfer training;Gait training;Balance Training;Strengthening;Neuromuscular Re-education;Motor Coordination Training;Postural Re-education;Wheelchair Management/Propulsion   Therapy Frequency 2 times/day;daily;Monday-Friday;Saturday or Sunday   Physical Therapy Follow-up   PT Follow-up? Yes   PT - Next Visit Date 10/18/17   Treatment/Billable Minutes   Gait training 10   Therapeutic Activity 22   Train/Wheelchair Management 13   Total Time 45       Dianna Frankel, SPT  10/17/2017          I certify that I was present in the room directing the student in service delivery and guided him/her using my skilled judgment. As the co-signing therapist, I have reviewed the student's documentation and am responsible for the treatment, assessment and plan.        Ruddy Sanchez, PT

## 2017-10-17 NOTE — PLAN OF CARE
Problem: Patient Care Overview  Goal: Plan of Care Review  Outcome: Ongoing (interventions implemented as appropriate)  Blood filled blister noted to the base of the nail on the lt index finger. Mrs St was instructed not to rupture the blister. Band aide applied to nail to protect the blister. Will continue with plan of care.

## 2017-10-17 NOTE — SUBJECTIVE & OBJECTIVE
Interval History: Pt without new c/o's.  I have reviewed the HPI and PMFSH and there are no changes from admission.       Scheduled Medications:    amitriptyline  50 mg Oral QHS    amlodipine  5 mg Oral Daily    atorvastatin  40 mg Oral Daily    benzocaine   Mouth/Throat BID    chlorhexidine  15 mL Mouth/Throat BID    dronabinol  5 mg Oral BID AC    erlotinib  150 mg Oral QHS    levetiracetam  750 mg Oral BID    lipase-protease-amylase 12,000-38,000-60,000 units  1 capsule Oral TID WM    magnesium oxide  400 mg Oral BID    metoprolol succinate  50 mg Oral Daily    miconazole   Topical (Top) BID    rivaroxaban  20 mg Oral Daily with dinner    senna-docusate 8.6-50 mg  1 tablet Oral BID       PRN Medications: bisacodyl, influenza, lorazepam, magnesium hydroxide 400 mg/5 ml, ondansetron, pneumoc 13-marcus conj-dip cr(PF), promethazine, ramelteon    Review of Systems   Constitutional: Negative for chills, fatigue and fever.   HENT: Negative for trouble swallowing and voice change.    Eyes: Negative for photophobia and visual disturbance.   Respiratory: Negative for cough, shortness of breath and wheezing.    Cardiovascular: Negative for chest pain and palpitations.   Gastrointestinal: Negative for abdominal distention and nausea.   Genitourinary: Negative for difficulty urinating and flank pain.   Musculoskeletal: Positive for gait problem. Negative for arthralgias.   Skin: Negative for color change and rash.   Neurological: Positive for speech difficulty and weakness. Negative for facial asymmetry, numbness and headaches.   Psychiatric/Behavioral: Negative for agitation and confusion.     Objective:     Vital Signs (Most Recent):  Temp: 97.9 °F (36.6 °C) (10/17/17 0629)  Pulse: 69 (10/17/17 0629)  Resp: 16 (10/17/17 0629)  BP: (!) 140/65 (10/17/17 0629)  SpO2: (!) 94 % (10/16/17 1948)    Vital Signs (24h Range):  Temp:  [97.9 °F (36.6 °C)-98.1 °F (36.7 °C)] 97.9 °F (36.6 °C)  Pulse:  [69-75] 69  Resp:   [16-18] 16  SpO2:  [94 %] 94 %  BP: (140-150)/(65-71) 140/65     Physical Exam   Constitutional: She appears well-developed and well-nourished.   HENT:   Head: Normocephalic and atraumatic.   Sores on superior palate   Eyes: EOM are normal. Pupils are equal, round, and reactive to light.   Neck: Normal range of motion.   Cardiovascular: Normal rate and regular rhythm.    Pulmonary/Chest: Effort normal. No respiratory distress.   Abdominal: Soft. There is no tenderness.   Musculoskeletal: Normal range of motion. She exhibits no deformity.   Neurological:   -  Mental Status:  AAOx3.  Follows commands.  Answers correct age and .  Recent and remote memory intact.  No neglect.    -  Speech and language:  - aphasia + dysarthria.    -  Coordination:  Finger to nose exam:  RUE mild dysmetria, LUE dysmetria.   -  Motor:  RUE: 4/5, 4/5 .  LUE: 5/5, 5/5 .  RLE: 5/5, DF 5/5, PF 5/5.  LLE: 5/5, DF 5/5, PF 5/5.   -  pronator drift. negative  -  Tone:  normal  -  Sensory:  Intact to light touch and pin prick.       Skin: Skin is warm and dry.   Two scabs noted with some redness over occiput area   Psychiatric: She has a normal mood and affect. Her behavior is normal.   Vitals reviewed.    NEUROLOGICAL EXAMINATION:     CRANIAL NERVES     CN III, IV, VI   Pupils are equal, round, and reactive to light.  Extraocular motions are normal.

## 2017-10-17 NOTE — PROGRESS NOTES
"Speech Therapy   Treatment/Progress Note    Naty St   MRN: [id number]     Short Term Goals   Goal 1 Pt will be oriented x4 indly with 100% acc. CONTINUE   Goal 2 Pt will complete language comprehension tasks given supervision with 90% acc targeting attention/comprehension skills.  CONTINUE   Goal 3 Pt will complete expressive language tasks targeting vocal intensity and word finding given min verbal cues with 75% acc. CONTINUE   Goal 4 Pt will complete problem solving/reasoning/sequencing tasks given min verbal and visual cues with 75% acc.  CONTINUE   Goal 5 Pt will complete recall task of recent/functional information given moderate verbal cues with 60% acc.  CONTINUE   Long Term Goals   Goal 1 Pt will complete language comprehension tasks with modified independence with % acc targeting attention/comprehension skills.    Goal 2 Pt will complete expressive language tasks targeting vocal intensity and word finding given supervision with 90% acc.    Goal 3 Pt will complete problem solving/reasoning/sequencing tasks given supervision with 90% acc.    Goal 4 Pt will complete recall task of recent/functional information given minimal verbal cues with 80% acc.    Goal 5 Pt will complete complex financial/medication management tasks given supervision with 90% acc.         10/17/17 1200   Speech Time Calculation   Speech Start Time 1115   Speech Stop Time 1200   Speech Total (min) 45 min   General Information   SLP Treatment Date 10/17/17   General Observations Pt seen individually in ST office while sitting upright in w/c with handoff from PT session.    General Precautions fall   Visual/Auditory Vision impaired  (glasses)   Subjective   Patient states Pt stated "I get to talking and forget what I need to remember."   Pain/Comfort   Pain Rating no pain       SLP Cognitive Treatment   Treatment Detail (SLP Cognitive Treatment) Pt completed orientation task x4 indly with 98% acc. Pt presents today with " increased mood compared to yesterdays session. Pt completed recall task of PT session activities with 90% acc indly, given supervision pt with increase to 100% acc with utilization of details. Pt completed mental manipulation task targeting recall and thought organization skills of F=3 according to progression with 53% acc indly, given moderate verbal cues for utilization of strategies and repetitions of information, pt with increase to 100% acc.        SLP Treatment: Verbal Expression   Treatment Detail (SLP Verbal Expression) Targeting word finding skills and thought organization pt engaged in divergent naming task of concrete category with 8 items listed of body parts indly, requiring min verbal cues for increase to 10 items listed. Drinks category completed with 7 items listed independently, given min verbal cues for organization strategies, pt with increase to listing 10 items. Pt completed expressive language task of similarities/differences of two concrete items with 0% acc indly, given moderate verbal cues for reasoning and thought organization skills, pt with increase to 100% acc. Pt with rushing characteristics of language and cognition negatively impacting pt's performance within tasks. Given moderate verbal cues for slow rate, pt with increase in functional language skills. Pt also presenting with characteristics of apraxia of speech today.    Assessment   SLP Diagnosis moderate-severe cog-ling deficits; moderate dysarthria; apraxia of speech   Prognosis Good   Problem List decreased oral planning skills; decreased attention skills; poor thought organization skills; decreased problem solving   Session Assessment progressing toward goals   Level of Motivation/Participation good   Discharge Recommendations   Discharge Facility/Level Of Care Needs outpatient speech therapy   Plan   Plan Continue with current plan   Treatment/Billable Minutes   Speech Therapy Individual 45   Total Time 45     FIM  Scores  Comprehension:4  Expression: 3  Social Interaction:4  Problem Solving:3  Memory: 2     Comprehension:  Complex= humor, finances, rationale for medical treatment(hip precautions, pressure relief)  Basic= pain, hunger, thirst, bathroom needs, cold, nutrition, sleep     Understands basic 75-89%- may need words repeated (4)     Expression:  Expresses basic 50-74% of time - Needs to repeat parts of sentences  (3)     Social Interaction:  Interacts appropriately 75-89% of time - needs redirection for appropriate language or to initiate interaction  (4)     Problem Solving:  Solves basic problems 50-74% of the time  (3)     Memory:  Recognizes, recalls, or executes 25-49% of time 1 step of 2 step request  (2)     Jewels Amado CCC-SLP  10/17/2017

## 2017-10-17 NOTE — PROGRESS NOTES
Occupational Therapy   FIM Scores    Naty St   MRN: [id number]      10/17/17 0730   Transfers   Bed/Chair/WC 4   Toilet 4   Self Care   Eating 5   Bathing 4   Dressing-Upper 5   Dressing-Lower 4   Toileting 4     LEOBARDO Bustillos   10/17/2017

## 2017-10-17 NOTE — PROGRESS NOTES
[facility]  Physical Medicine & Rehab  Progress Note    Patient Name: Naty St  MRN: [id number]  Patient Class: IP- Rehab   Admission Date: 10/11/2017  Length of Stay: 6 days  Attending Physician: Yordy Ponce MD  Primary Care Provider: Olvin Mars MD    Subjective:     Principal Problem: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    Interval History: Pt without new c/o's.  I have reviewed the HPI and PMFSH and there are no changes from admission.       Scheduled Medications:    amitriptyline  50 mg Oral QHS    amlodipine  5 mg Oral Daily    atorvastatin  40 mg Oral Daily    benzocaine   Mouth/Throat BID    chlorhexidine  15 mL Mouth/Throat BID    dronabinol  5 mg Oral BID AC    erlotinib  150 mg Oral QHS    levetiracetam  750 mg Oral BID    lipase-protease-amylase 12,000-38,000-60,000 units  1 capsule Oral TID WM    magnesium oxide  400 mg Oral BID    metoprolol succinate  50 mg Oral Daily    miconazole   Topical (Top) BID    rivaroxaban  20 mg Oral Daily with dinner    senna-docusate 8.6-50 mg  1 tablet Oral BID       PRN Medications: bisacodyl, influenza, lorazepam, magnesium hydroxide 400 mg/5 ml, ondansetron, pneumoc 13-marcus conj-dip cr(PF), promethazine, ramelteon    Review of Systems   Constitutional: Negative for chills, fatigue and fever.   HENT: Negative for trouble swallowing and voice change.    Eyes: Negative for photophobia and visual disturbance.   Respiratory: Negative for cough, shortness of breath and wheezing.    Cardiovascular: Negative for chest pain and palpitations.   Gastrointestinal: Negative for abdominal distention and nausea.   Genitourinary: Negative for difficulty urinating and flank pain.   Musculoskeletal: Positive for gait problem. Negative for arthralgias.   Skin: Negative for color change and rash.   Neurological: Positive for speech difficulty and weakness. Negative for facial asymmetry, numbness and  headaches.   Psychiatric/Behavioral: Negative for agitation and confusion.     Objective:     Vital Signs (Most Recent):  Temp: 97.9 °F (36.6 °C) (10/17/17 0629)  Pulse: 69 (10/17/17 0629)  Resp: 16 (10/17/17 0629)  BP: (!) 140/65 (10/17/17 0629)  SpO2: (!) 94 % (10/16/17 1948)    Vital Signs (24h Range):  Temp:  [97.9 °F (36.6 °C)-98.1 °F (36.7 °C)] 97.9 °F (36.6 °C)  Pulse:  [69-75] 69  Resp:  [16-18] 16  SpO2:  [94 %] 94 %  BP: (140-150)/(65-71) 140/65     Physical Exam   Constitutional: She appears well-developed and well-nourished.   HENT:   Head: Normocephalic and atraumatic.   Sores on superior palate   Eyes: EOM are normal. Pupils are equal, round, and reactive to light.   Neck: Normal range of motion.   Cardiovascular: Normal rate and regular rhythm.    Pulmonary/Chest: Effort normal. No respiratory distress.   Abdominal: Soft. There is no tenderness.   Musculoskeletal: Normal range of motion. She exhibits no deformity.   Neurological:   -  Mental Status:  AAOx3.  Follows commands.  Answers correct age and .  Recent and remote memory intact.  No neglect.    -  Speech and language:  - aphasia + dysarthria.    -  Coordination:  Finger to nose exam:  RUE mild dysmetria, LUE dysmetria.   -  Motor:  RUE: 4/5, 4/5 .  LUE: 5/5, 5/5 .  RLE: 5/5, DF 5/5, PF 5/5.  LLE: 5/5, DF 5/5, PF 5/5.   -  pronator drift. negative  -  Tone:  normal  -  Sensory:  Intact to light touch and pin prick.       Skin: Skin is warm and dry.   Two scabs noted with some redness over occiput area   Psychiatric: She has a normal mood and affect. Her behavior is normal.   Vitals reviewed.    NEUROLOGICAL EXAMINATION:     CRANIAL NERVES     CN III, IV, VI   Pupils are equal, round, and reactive to light.  Extraocular motions are normal.       Assessment/Plan:      Naty Condoll Maciel is a 72 y.o. female admitted to inpatient rehabilitation on 10/11/2017 for 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 with  impaired mobility and ADLs. Patient remains appropriate for PT, OT, and as required Speech therapy. Patient continues to require 24 hour nursing care as well as daily Physician assessment.    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 Continue working with PT/OT        CKD (chronic kidney disease) stage 3, GFR 30-59 ml/min    Uptrending. Will continue to monitor. Encouraged PO fluids.        Hypomagnesemia    1.7 today. Continue Mag Oxide 400mg BID. Continue to monitor        Essential hypertension    Controlled. Continue meds.            DISCHARGE PLANNING:  Tentative Discharge Date: 10/26/2017    Future Appointments  Date Time Provider Department Center   11/9/2017 9:30 AM [facility] PET CT LIMIT 500 LBS [facility]   11/9/2017 2:00 PM Pablo Lr MD [facility] STROKE Reece Milan   11/10/2017 9:30 AM LAB, HEMONC CANCER BLDG [facility] HO [facility]   11/10/2017 10:30 AM Karrie Vazquez MD [facility] HEM ONC [facility]   11/10/2017 11:30 AM INJECTION, [facility] INFUSION [facility]   12/5/2017 1:30 PM [facility] MRI2 [facility] MRI Reece Ponce MD  Department of Physical Medicine & Rehab   [facility]

## 2017-10-17 NOTE — PROGRESS NOTES
Physical Therapy   FIM Scores    Naty St   MRN: [id number]            10/17/17 1100   Transfers   Bed/Chair/WC 4   Toilet 4   Tub 4   Locomotion   Distance Walked 3   Distance Wheelchair 2   Walk 4   Wheelchair 2   Mode C   Stairs 4         Dianna Jostin, DYLON  10/17/2017        I certify that I was present in the room directing the student in service delivery and guided him/her using my skilled judgment. As the co-signing therapist, I have reviewed the student's documentation and am responsible for the treatment, assessment and plan.        Ruddy Sanchez, PT

## 2017-10-17 NOTE — PROGRESS NOTES
"Occupational Therapy   AM Treatment/Re-assessment    Naty St   MRN: [id number]      10/17/17 0730   OT Time Calculation   OT Start Time 0730   OT Stop Time 0817   OT Total Time (min) 47 min   General   OT Date of Treatment 10/17/17   Precautions   General Precautions fall   Visual/Auditory Vision impaired  (glasses)   Subjective   Patient states "My finger is bleeding, it's that blood medicine"    Pain/Comfort   Pain Rating no pain   Bed Mobility   Supine to Sit Supervision   Transfers   Transfer yes   Sit to Stand   Sit <> Stand Assistance Contact Guard Assistance   Sit <> Stand Assistive Device Rolling Walker   Trials/Comments cues for hand placement, pt pulls up on walker   Stand to Sit   Assistance Contact Guard Assistance   Assistive Device Rolling Walker   Trials/Comments cues for hand placement   Toilet Transfer   Toilet Transfer Assistance Minimum Assistance   Toilet Transfer Assistive Device Rolling Walker   Trials/Comments steadying, cues for hand placement, pt abandons walker during pivot   Bathing   Bathing Level of Assistance Contact guard;Minimum assistance   Bathing Where Assessed Shower;Shower Chair/bench   Bathing Comments steadying assist only, pt bathes and dries 10/10 body parts   UE Dressing   UE Dressing Level of Assistance Stand by assistance   UE Dressing Where Assessed Wheelchair   LE Dressing   LE Dressing  Level of Assistance Minimum assistance   LE Dressing Level of Assistance Minimum assistance   Sock Level of Assistance Supervision   Shoe Level of Assistance Supervision   LE Dressing Where Assessed (seated on TTB)   LE Dressing Comments steadying assist in standing due to posterior lean, pt able to thread LEs, manage over hips and tie   Toileting   Toileting Level of Assistance Minimum assistance   Toileting Where Assessed Toilet   Activity Tolerance   Activity Tolerance Patient tolerated treatment well   Medical Staff Made Aware NSG   After Treatment   Patient Position " After Treatment Seated in wheelchair   Patient after treatment left call button in reach;nursing notified  (safety alarm in place)   Assessment   Prognosis Good   Assessment Pt re-assessed today and has met all STG for this week. Pt remains appropriate for IP rehab and is making good progress toward LTG. Pt still requires close supervision-CGA with ADLs 2* impulsiveness, impaired safety awareness, and balance deficits.    Level of Motivation/Participation good   Short Term Goals   Pt Will Perform Supine To Sit Supervision   Supine to Sit - Met/Not Met Met   Pt Will Perform Sit to Supine Supervision;New goal   Supine to Sit - Met/Not Met Met   Pt Will Transfer Bed/Chair With RW;With minimal assist   Transfer Bed/Chair - Met/Not Met Met   Pt Will Perform Eating With setup   Eating - Met/Not Met Met   Pt Will Perform Bathing With minimal assistance   Bathing - Met/Not Met Met   Pt Will Perform UE Dressing With stand by assistance   UE Dressing - Met/Not Met Met   Pt Will Perform LE Dressing With minimal assistance   LE Dressing - Met/Not Met Met   Pt Will Perform Toileting With minimal assistance   Toileting-Met/Not Met Met   Discharge Recommendations   Equipment Needed After Discharge walker, rolling;wheelchair  (tub bench)   Discharge Facility/Level Of Care Needs outpatient OT   Plan   Plan Continue with current plan   Therapy Frequency 2 times/day;Monday-Friday;Saturday or Sunday   Occupational Therapy Follow-up   OT Follow-up? Yes   Treatment/Billable Minutes   Self Care/Home Management 47   Total Time 47

## 2017-10-18 PROCEDURE — 99232 SBSQ HOSP IP/OBS MODERATE 35: CPT | Mod: ,,, | Performed by: PHYSICAL MEDICINE & REHABILITATION

## 2017-10-18 PROCEDURE — 12800000 HC REHAB SEMI-PRIVATE ROOM

## 2017-10-18 PROCEDURE — 97530 THERAPEUTIC ACTIVITIES: CPT

## 2017-10-18 PROCEDURE — 63600175 PHARM REV CODE 636 W HCPCS: Performed by: PHYSICAL MEDICINE & REHABILITATION

## 2017-10-18 PROCEDURE — 97116 GAIT TRAINING THERAPY: CPT

## 2017-10-18 PROCEDURE — 97150 GROUP THERAPEUTIC PROCEDURES: CPT

## 2017-10-18 PROCEDURE — 92507 TX SP LANG VOICE COMM INDIV: CPT

## 2017-10-18 PROCEDURE — 97542 WHEELCHAIR MNGMENT TRAINING: CPT

## 2017-10-18 PROCEDURE — 97110 THERAPEUTIC EXERCISES: CPT

## 2017-10-18 PROCEDURE — 25000003 PHARM REV CODE 250: Performed by: PHYSICAL MEDICINE & REHABILITATION

## 2017-10-18 PROCEDURE — 97112 NEUROMUSCULAR REEDUCATION: CPT

## 2017-10-18 PROCEDURE — 25000003 PHARM REV CODE 250: Performed by: STUDENT IN AN ORGANIZED HEALTH CARE EDUCATION/TRAINING PROGRAM

## 2017-10-18 RX ADMIN — PANCRELIPASE 1 CAPSULE: 60000; 12000; 38000 CAPSULE, DELAYED RELEASE PELLETS ORAL at 07:10

## 2017-10-18 RX ADMIN — LEVETIRACETAM 750 MG: 750 TABLET ORAL at 09:10

## 2017-10-18 RX ADMIN — AMITRIPTYLINE HYDROCHLORIDE 50 MG: 25 TABLET, FILM COATED ORAL at 09:10

## 2017-10-18 RX ADMIN — MICONAZOLE NITRATE: 20 CREAM TOPICAL at 09:10

## 2017-10-18 RX ADMIN — ATORVASTATIN CALCIUM 40 MG: 20 TABLET, FILM COATED ORAL at 07:10

## 2017-10-18 RX ADMIN — CHLORHEXIDINE GLUCONATE 15 ML: 1.2 RINSE ORAL at 10:10

## 2017-10-18 RX ADMIN — Medication 400 MG: at 07:10

## 2017-10-18 RX ADMIN — CHLORHEXIDINE GLUCONATE 15 ML: 1.2 RINSE ORAL at 08:10

## 2017-10-18 RX ADMIN — DRONABINOL 5 MG: 2.5 CAPSULE ORAL at 05:10

## 2017-10-18 RX ADMIN — BENZOCAINE: 100 GEL TOPICAL at 07:10

## 2017-10-18 RX ADMIN — PANCRELIPASE 1 CAPSULE: 60000; 12000; 38000 CAPSULE, DELAYED RELEASE PELLETS ORAL at 05:10

## 2017-10-18 RX ADMIN — AMLODIPINE BESYLATE 5 MG: 5 TABLET ORAL at 07:10

## 2017-10-18 RX ADMIN — METOPROLOL SUCCINATE 50 MG: 50 TABLET, EXTENDED RELEASE ORAL at 08:10

## 2017-10-18 RX ADMIN — Medication 400 MG: at 09:10

## 2017-10-18 RX ADMIN — RIVAROXABAN 20 MG: 20 TABLET, FILM COATED ORAL at 05:10

## 2017-10-18 RX ADMIN — ERLOTINIB HYDROCHLORIDE 150 MG: 150 TABLET, FILM COATED ORAL at 08:10

## 2017-10-18 RX ADMIN — DRONABINOL 5 MG: 2.5 CAPSULE ORAL at 07:10

## 2017-10-18 RX ADMIN — MICONAZOLE NITRATE: 20 CREAM TOPICAL at 07:10

## 2017-10-18 RX ADMIN — STANDARDIZED SENNA CONCENTRATE AND DOCUSATE SODIUM 1 TABLET: 8.6; 5 TABLET, FILM COATED ORAL at 07:10

## 2017-10-18 RX ADMIN — LORAZEPAM 1 MG: 1 TABLET ORAL at 09:10

## 2017-10-18 RX ADMIN — LEVETIRACETAM 750 MG: 750 TABLET ORAL at 07:10

## 2017-10-18 NOTE — PROGRESS NOTES
"Occupational Therapy  AM Treatment Session  Naty St   MRN: [id number]   A client care conference was performed between the LOTR and RETANA, prior to treatment by MAZIN, to discuss the patient's status, treatment plan and established goals.     10/18/17 0831   OT Time Calculation   OT Start Time 0831   OT Stop Time 0917   OT Total Time (min) 46 min   General   OT Date of Treatment 10/18/17   Family/Caregiver Present No   Patient Found (position) Seated in wheelchair  (safety belt)   Precautions   General Precautions fall   Visual/Auditory Vision impaired  (glasses)   Subjective   Patient states "only when i do something with them" re: Pt reported while reaching for objects during standing activity referring to pain in both hands   Pain/Comfort   Pain Rating no pain   Bed Mobility   Rolling/Turning to Left Independent   Rolling/Turning Left Comments supine on Mat; no cues or (A)   Rolling/Turning Right Independent   Rolling/Turning Right Comments same   Scooting/Bridging Stand by Assistance   Scooting/Bridging Comments to EOM   Supine to Sit Supervision   Supine to Sit Comments for safety; no cue or (A)   Sit to Supine Supervision   Sit to Supine Comments same   Sit to Stand   Sit <> Stand Assistance Contact Guard Assistance   Sit <> Stand Assistive Device Rolling Walker   Trials/Comments from WC   Stand to Sit   Assistance Contact Guard Assistance   Assistive Device Rolling Walker   Trials/Comments cue to reach back   Chair to Mat   Chair <>Mat Technique Stand Pivot   Chair <> Mat Assistance Minimum Assistance   Chair<> Mat Assistive Device Rolling Walker   Trials/Comments MIN A for x1 LOB during pivot   UE Dressing   UE Dressing Level of Assistance Stand by assistance   UE Dressing Where Assessed Other (Comment)   UE Dressing Comments @ EOM for pullover shirt   Additional Activities   Additional Activities Other (Comment)   Additional Activities Comments functional mobility activity ambulating ~24' x2 " trials from EOM <> kitchen counter using RW (CGA to steady) while reaching for objects at various heights to promote dynamic standing balance for home ADL/IADLs; - Pt seated @ EOM using RUE AROM reaching for objects through small area to promote RUE gross motor control; - 1# dowel BUE AROM while supine @ Mat for promoting RUE gross  motor control   Activity Tolerance   Activity Tolerance Patient tolerated treatment well   After Treatment   Patient Position After Treatment Seated in wheelchair  (safety belt)   Patient after treatment left call button in reach   Assessment   Prognosis Good   Problem List Decreased Self Care skills;Decreased upper extremity strength;Decreased safe judgment during ADL;Decreased endurance;Decreased gross motor control;Decreased functional mobility;Decreased fine motor control;Decreased IADLs;Decreased Function of right upper extremity;Decreased trunk control for functional activities;Decreased cognition   Assessment Pt pleasant and awake upon entry limited by decreased RUE gross motor control and required MIN A in stance/pivot to steady.  Pt used good effort for controlling RUE during functional reach activity. Pt would cont to benefit from OT services to increase functional mobility.   Level of Motivation/Participation Good   Barriers to Discharge None   Discharge Recommendations   Equipment Needed After Discharge bath bench;walker, rolling;wheelchair   Plan   Plan Continue with current plan   Therapy Frequency 2 times/day;Monday-Friday;Saturday or Sunday   Treatment/Billable Minutes   Self Care/Home Management 6   Therapeutic Activity 23   Therapeutic Exercise 17   Total Time 46       CAMILLE Guzman 10/18/2017

## 2017-10-18 NOTE — PROGRESS NOTES
Recreational Therapy   Treatment    Naty Lopez Maciel  MRN: [id number]      Pt refused to attend Relaxation session.    Stacey Pandey, CTRS,   10/18/2017

## 2017-10-18 NOTE — SUBJECTIVE & OBJECTIVE
Interval History: Pt complaining of rash that started on her right hand. Denies pain or itchiness.  I have reviewed the HPI and PMFSH and there are no changes from admission.       Scheduled Medications:    amitriptyline  50 mg Oral QHS    amlodipine  5 mg Oral Daily    atorvastatin  40 mg Oral Daily    benzocaine   Mouth/Throat BID    chlorhexidine  15 mL Mouth/Throat BID    dronabinol  5 mg Oral BID AC    erlotinib  150 mg Oral QHS    levetiracetam  750 mg Oral BID    lipase-protease-amylase 12,000-38,000-60,000 units  1 capsule Oral TID WM    magnesium oxide  400 mg Oral BID    metoprolol succinate  50 mg Oral Daily    miconazole   Topical (Top) BID    rivaroxaban  20 mg Oral Daily with dinner    senna-docusate 8.6-50 mg  1 tablet Oral BID       PRN Medications: bisacodyl, influenza, lorazepam, magnesium hydroxide 400 mg/5 ml, ondansetron, pneumoc 13-marcus conj-dip cr(PF), promethazine, ramelteon    Review of Systems   Constitutional: Negative for chills, fatigue and fever.   HENT: Negative for trouble swallowing and voice change.    Eyes: Negative for photophobia and visual disturbance.   Respiratory: Negative for cough, shortness of breath and wheezing.    Cardiovascular: Negative for chest pain and palpitations.   Gastrointestinal: Negative for abdominal distention and nausea.   Genitourinary: Negative for difficulty urinating and flank pain.   Musculoskeletal: Positive for gait problem. Negative for arthralgias.   Skin: Negative for color change and rash.   Neurological: Positive for speech difficulty and weakness. Negative for facial asymmetry, numbness and headaches.   Psychiatric/Behavioral: Negative for agitation and confusion.     Objective:     Vital Signs (Most Recent):  Temp: 98 °F (36.7 °C) (10/17/17 1913)  Pulse: 78 (10/17/17 1913)  Resp: 16 (10/17/17 1913)  BP: (!) 142/62 (10/17/17 1913)  SpO2: 97 % (10/17/17 1913)    Vital Signs (24h Range):  Temp:  [98 °F (36.7 °C)] 98 °F (36.7  °C)  Pulse:  [78] 78  Resp:  [16] 16  SpO2:  [97 %] 97 %  BP: (142)/(62) 142/62     Physical Exam   Constitutional: She appears well-developed and well-nourished.   HENT:   Head: Normocephalic and atraumatic.   Sores on superior palate   Eyes: EOM are normal. Pupils are equal, round, and reactive to light.   Neck: Normal range of motion.   Cardiovascular: Normal rate and regular rhythm.    Pulmonary/Chest: Effort normal. No respiratory distress.   Abdominal: Soft. There is no tenderness.   Musculoskeletal: Normal range of motion. She exhibits no deformity.   Neurological:   -  Mental Status:  AAOx3.  Follows commands.  Answers correct age and .  Recent and remote memory intact.  No neglect.    -  Speech and language:  - aphasia + dysarthria.    -  Coordination:  Finger to nose exam:  RUE mild dysmetria, LUE dysmetria.   -  Motor:  RUE: 4/5, 4/5 .  LUE: 5/5, 5/5 .  RLE: 5/5, DF 5/5, PF 5/5.  LLE: 5/5, DF 5/5, PF 5/5.   -  pronator drift. negative  -  Tone:  normal  -  Sensory:  Intact to light touch and pin prick.       Skin: Skin is warm and dry.   Two scabs noted with some redness over occiput area   Psychiatric: She has a normal mood and affect. Her behavior is normal.   Vitals reviewed.    NEUROLOGICAL EXAMINATION:     CRANIAL NERVES     CN III, IV, VI   Pupils are equal, round, and reactive to light.  Extraocular motions are normal.

## 2017-10-18 NOTE — PROGRESS NOTES
"Physical Therapy   Treatment    Naty St   MRN: [id number]          10/18/17 1200   PT Time Calculation   PT Start Time 1030   PT Stop Time 1115   PT Total Time (min) 45 min   Treatment   Treatment Type Treatment   PT/PTA PT   General   PT Received On 10/18/17   Family/Caregiver Present No   Patient Found (position) Seated in wheelchair  (in room)   Pt found with (posey belt in place)   Precautions   General Precautions fall   Visual/Auditory Vision impaired  (glasses)   Subjective   Patient states "If I wear gloves it's easier to push my wheelchair"   Pain/Comfort   Pain Rating 1 no pain   Pain Rating Post-Intervention 1 no pain   Bed Mobility   Bed Mobility no   Transfers   Transfer yes   Sit to Stand   Sit <> Stand Assistance Contact Guard Assistance;Minimum Assistance   Sit <> Stand Assistive Device No Assistive Device   Trials/Comments Pt performed multiple trials from w/c and bouncy chair  (look in other activities section for full exercise)   Stand to Sit   Assistance Contact Guard Assistance;Minimum Assistance   Assistive Device No Assistive Device   Trials/Comments Pt performed multiple trials onto w/c and bouncy chair  (look in other activities section for full exercise)   Wheelchair Activities   Propulsion Yes   Propulsion Type 1 Manual   Level 1 Level tile   Method 1 Right upper extremity;Left upper extremity;Right lower extremity;Left lower extremity   Level of Assistance 1 Stand by assistsance   Description/ Details 1 pt propels w/c ~200 ft w/ SBA and cues to avoid obstacles  (w/c cushion removed for activity)   Gait   Gait Yes   Weight Bearing Status full   Gait 1   Surface 1 Level tile   Gait Assistive Device Rolling walker   Other Apparatus 1 Other (Comment)  (gait belt)   Assistance 1 Minimum assistance;Contact Guard Assistance   Gait Distance Pt ambulates 244 ft w/ CGA + Min A w/ one episode of LOB. Pt took a break and then ambulated 255 ft w/ CGA w/o LOB.  Pt requires constant cueing " to correct veering R, focus on task, and to slow down pace on turns to improve balance.   Gait Pattern swing-through gait   Gait Deviation(s) decreased leila;increased time in double stance;decreased velocity of limb motion;decreased step length;decreased weight-shifting ability;decreased stride length;forward lean;lateral lean  (lateral lean to the R)   Impairments Contributing to Gait Deviations impaired balance;impaired coordination;impaired motor control;impaired postural control;decreased strength;other (see comments)  (decreased cognition/attention)   Stairs   Stairs No   Balance   Balance No   Other Activities   Other Activities Other (Comment)   Comments Pt performed ~30 repetitions of sit <> stands off of bouncy chair w/ tactile, visual, and verbal cues to improve forward lean( sit<> stand) and knee flexion( stand> sit).  UE support varied from w/c handles or PT/SPT's hands( or no UE support).  (pt requires min/CGA to facilitate above activity)   Activity Tolerance   Activity Tolerance Patient tolerated treatment well   Other Comments   Comments stand pivot transfers w/c<> bouncy chair in gym with CGA   After Treatment   Patient Position After Treatment Seated in wheelchair  (in room)   Patient after treatment left call button in reach  (posey belt in place)   Assessment   Prognosis Good   Problem List Decreased strength;Decreased endurance;Impaired balance;Decreased mobility;Decreased coordination;Decreased cognition;Decreased safety awareness;Impaired vision   Session Assessment progressing toward goals   Assessment Pt did well today, but required topographical assistance for both w/c propulsion and ambulation w/ RW.  Pt required cueing to wait and listen for instructions prior to moving/getting out of w/c.  Pt responds well to demonstrations prior to performing mobility skills, and sit <> stand transfer improved throughout session.  Pt continues to demonstrate decreased strength, ataxia, impaired  coordination, and distractibility.  Pt continues to benefit from inpatient rehab.    Level of Motivation/Participation Good   Barriers to Discharge None   Barriers to Discharge Comments Good family support   Discharge Recommendations   Equipment Needed After Discharge bath bench;walker, rolling;wheelchair   Discharge Facility/Level Of Care Needs outpatient PT   Plan   Planned Therapy Intervention Continue with current plan;Bed mobility training;Transfer training;Gait training;Balance Training;Strengthening;Neuromuscular Re-education;Motor Coordination Training;Postural Re-education;Wheelchair Management/Propulsion   Therapy Frequency 2 times/day;Monday-Friday;Saturday or Sunday   Physical Therapy Follow-up   PT Follow-up? Yes   PT - Next Visit Date 10/19/17   Treatment/Billable Minutes   Gait training 15   Neuromuscular Re-education 20   Train/Wheelchair Management 10   Total Time 45         Dianna Frankel, SPT  10/18/2017        I certify that I was present in the room directing the student in service delivery and guided him/her using my skilled judgment. As the co-signing therapist, I have reviewed the student's documentation and am responsible for the treatment, assessment and plan.        Ruddy Sanchez, PT

## 2017-10-18 NOTE — PROGRESS NOTES
Physical Therapy   FIM Scores    Naty St   MRN: [id number]          10/18/17 1200   Locomotion   Distance Walked 3   Distance Wheelchair 3   Walk 4   Wheelchair 5   Mode C         Dianna Frankel, [facility]  10/18/2017          I certify that I was present in the room directing the student in service delivery and guided him/her using my skilled judgment. As the co-signing therapist, I have reviewed the student's documentation and am responsible for the treatment, assessment and plan.        Ruddy Sanchez, PT

## 2017-10-18 NOTE — PROGRESS NOTES
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 Denies pain or itchiness.  [facility] and [facility] are [facility] changes from [facility].       Scheduled Medications:    amitriptyline  50 mg Oral QHS    amlodipine  5 mg Oral Daily    atorvastatin  40 mg Oral Daily    benzocaine   Mouth/Throat BID    chlorhexidine  15 mL Mouth/Throat BID    dronabinol  5 mg Oral BID AC    erlotinib  150 mg Oral QHS    levetiracetam  750 mg Oral BID    lipase-protease-amylase 12,000-38,000-60,000 units  1 capsule Oral TID WM    magnesium oxide  400 mg Oral BID    metoprolol succinate  50 mg Oral Daily    miconazole   Topical (Top) BID    rivaroxaban  20 mg Oral Daily with dinner    senna-docusate 8.6-50 mg  1 tablet Oral BID       PRN Medications: bisacodyl, influenza, lorazepam, magnesium hydroxide 400 mg/5 ml, ondansetron, pneumoc 13-marcus conj-dip cr(PF), promethazine, ramelteon    Review of Systems   Constitutional: Negative for chills, fatigue and fever.   HENT: Negative for trouble swallowing and voice change.    Eyes: Negative for photophobia and visual disturbance.   Respiratory: Negative for cough, shortness of breath and wheezing.    Cardiovascular: Negative for chest pain and palpitations.   Gastrointestinal: Negative for abdominal distention and nausea.   Genitourinary: Negative for difficulty urinating and flank pain.   Musculoskeletal: Positive for gait problem. Negative for arthralgias.   Skin: Negative for color change and rash.   Neurological: Positive for speech difficulty and  weakness. Negative for facial asymmetry, numbness and headaches.   Psychiatric/Behavioral: Negative for agitation and confusion.     Objective:     Vital Signs (Most Recent):  Temp: 98 °F (36.7 °C) (10/17/17 1913)  Pulse: 78 (10/17/17 1913)  Resp: 16 (10/17/17 1913)  BP: (!) 142/62 (10/17/17 1913)  SpO2: 97 % (10/17/17 1913)    Vital Signs (24h Range):  Temp:  [98 °F (36.7 °C)] 98 °F (36.7 °C)  Pulse:  [78] 78  Resp:  [16] 16  SpO2:  [97 %] 97 %  BP: (142)/(62) 142/62     Physical Exam   Constitutional: She appears well-developed and well-nourished.   HENT:   Head: Normocephalic and atraumatic.   Sores on superior palate   Eyes: EOM are normal. Pupils are equal, round, and reactive to light.   Neck: Normal range of motion.   Cardiovascular: Normal rate and regular rhythm.    Pulmonary/Chest: Effort normal. No respiratory distress.   Abdominal: Soft. There is no tenderness.   Musculoskeletal: Normal range of motion. She exhibits no deformity.   Neurological:   -  Mental Status:  AAOx3.  Follows commands.  Answers correct age and .  Recent and remote memory intact.  No neglect.    -  Speech and language:  - aphasia + dysarthria.    -  Coordination:  Finger to nose exam:  RUE mild dysmetria, LUE dysmetria.   -  Motor:  RUE: 4/5, 4/5 .  LUE: 5/5, 5/5 .  RLE: 5/5, DF 5/5, PF 5/5.  LLE: 5/5, DF 5/5, PF 5/5.   -  pronator drift. negative  -  Tone:  normal  -  Sensory:  Intact to light touch and pin prick.       Skin: Skin is warm and dry.   Two scabs noted with some redness over occiput area   Psychiatric: She has a normal mood and affect. Her behavior is normal.   Vitals reviewed.    NEUROLOGICAL EXAMINATION:     CRANIAL NERVES     CN III, IV, VI   Pupils are equal, round, and reactive to light.  Extraocular motions are normal.       Assessment/Plan:      Naty St is a 72 y.o. female admitted to inpatient rehabilitation on 10/11/2017 for 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  artery with impaired mobility and ADLs. Patient remains appropriate for PT, OT, and as required Speech therapy. Patient continues to require 24 hour nursing care as well as daily Physician assessment.    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 Continue working with PT/OT        CKD (chronic kidney disease) stage 3, GFR 30-59 ml/min    Uptrending. Will continue to monitor. Encouraged PO fluids.        Hypomagnesemia    1.7 today. Continue Mag Oxide 400mg BID. Continue to monitor        Essential hypertension    Controlled. Continue meds.            DISCHARGE PLANNING:  Tentative Discharge Date: 10/26/2017    Future Appointments  Date Time Provider Department Center   11/9/2017 9:30 AM [facility] CT LIMIT 500 LBS [facility]   11/9/2017 2:00 PM Pablo Lr MD [facility] STROKE Reece ruben   11/10/2017 9:30 AM LAB, HEMONC CANCER BLDG [facility] LAB HO Daniel Dillard   11/10/2017 10:30 AM Karrie Vazquez MD [facility] HEM ONC Daniel Dillard   11/10/2017 11:30 AM INJECTION, [facility] INFUSION [facility] CHEMO [facility]   12/5/2017 1:30 PM [facility] MRI2 [facility] MRI Reece ruben Ponce MD  Department of Physical Medicine & Rehab   [facility]

## 2017-10-18 NOTE — PROGRESS NOTES
Occupational Therapy  [place] [facility]  Naty [address] St   MRN: [id number]        10/18/17 1600   Transfers   Bed/Chair/WC 4   Self Care   Dressing-Upper 5       CAMILLE Guzman 10/18/2017

## 2017-10-18 NOTE — PROGRESS NOTES
Speech Therapy   Treatment    [address]   MRN: [id number]      10/18/17 1400   Speech Time Calculation   Speech Start Time 1400   Speech Stop Time 1450   Speech Total (min) 50 min   General Information   SLP Treatment Date 10/18/17   General Observations Pt seen in  office- alert and motivated t/o session. Pt noted with moderate word finding difficulty in conversation with SLP.    General Precautions fall   Visual/Auditory Vision impaired  (glasses)   Pain/Comfort   Pain Rating no pain       SLP Cognitive Treatment   Treatment Detail (SLP Cognitive Treatment) Oriented x4 independently. Pt recalled recent events (i.e. visitors, admit, medical hx) with no difficulty. Pt completed verbal similarities/differences task given FO2 to improve thought organization and reasoning - pt completed task with 70% accy independently, 100% accy with cues - semantic cues needed to assist with improving thought organization.        SLP Treatment: Verbal Expression   Treatment Detail (SLP Verbal Expression) Pt completed concrete convergent naming task with 100% accy independently. To increase difficulty, pt completed task with abstract items with 70% accy independently, 80% accy with semantic cues - cues needed to assist with word finding. Pt completed concrete divergent naming task to improve word finding - pt completed task naming fruit x13, drinks x8, and kitchen items x10 each under one minute - pt needed semantic/phonemic cues x4 across task to assist with word finding.    Assessment   SLP Diagnosis mild-mod cog-ling deficits   Prognosis Good   Problem List decreased insight; decreased recall; decreased word finding   Session Assessment progressing toward goals   Level of Motivation/Participation good   Discharge Recommendations   Discharge Facility/Level Of Care Needs home health speech therapy   Plan   Plan Continue with current plan   SLP Follow-up   SLP Follow-up? Yes   Treatment/Billable Minutes   Speech Therapy  Individual 50   Total Time 50       FIM Scores  Comprehension:4  Expression: 3  Social Interaction:4  Problem Solving:3  Memory: 2     Comprehension:  Complex= humor, finances, rationale for medical treatment(hip precautions, pressure relief)  Basic= pain, hunger, thirst, bathroom needs, cold, nutrition, sleep     Understands basic 75-89%- may need words repeated (4)     Expression:  Expresses basic 50-74% of time - Needs to repeat parts of sentences  (3)     Social Interaction:  Interacts appropriately 75-89% of time - needs redirection for appropriate language or to initiate interaction  (4)     Problem Solving:  Solves basic problems 50-74% of the time  (3)     Memory:  Recognizes, recalls, or executes 25-49% of time 1 step of 2 step request  (2)     LEONEL Terrell-SLP  10/18/2017

## 2017-10-18 NOTE — PROGRESS NOTES
Physical Therapy  Transfer Training Group Treatment    [address]   MRN: [id number]      10/18/17 1500   PT Time Calculation   PT Start Time 1500   PT Stop Time 1545   PT Total Time (min) 45 min   Treatment   Treatment Type Treatment   PT/PTA PT   General   PT Received On 10/18/17   Missed Time Reason Not applicable   Family/Caregiver Present No   Patient Found (position) Seated in wheelchair   Precautions   General Precautions fall   Visual/Auditory Vision impaired   Subjective   Patient states Pt agreeable to group treatment   Pain/Comfort   Pain Rating 1 no pain   Other Comments   Comments Patient participated in 45 minute functional transfers group. The group activity challenged bilateral upper extremity and lower extremity strengthening. In addition, cardiovascular and functional endurance were challenged during this group. Patient performed wheelchair <> mat transfer with STPT and CGA. Patient completed bed mobility from supine to sitting edge of mat with SPV. Pt was Min A for w/c management parts. Patient completed toilet transfer with CGA and use of RW.  Patient completed w/c <> car transfer with Min A and RW. Patient demonstrated appropriate safety awareness with functional transfers. Educated patient on compensatory and adaptive techniques for functional home transfers. Patient demonstrated increased independence with all transfers. These activities were performed in a group setting to encourage increased participation with peers and social interaction skills.   After Treatment   Patient Position After Treatment Seated in wheelchair   Patient after treatment left call button in reach   Plan   Planned Therapy Intervention Continue with current plan   Therapy Frequency 2 times/day;Monday-Friday;Saturday or Sunday   Physical Therapy Follow-up   PT Follow-up? Yes   PT - Next Visit Date 10/19/17   Treatment/Billable Minutes   Therapeutic Activity Group 45   Total Time 45   Roseline Rebolledo  DPT  10/18/2017

## 2017-10-19 LAB
ANION GAP SERPL CALC-SCNC: 6 MMOL/L
BASOPHILS # BLD AUTO: 0.02 K/UL
BASOPHILS NFR BLD: 0.3 %
BUN SERPL-MCNC: 22 MG/DL
CALCIUM SERPL-MCNC: 8.2 MG/DL
CHLORIDE SERPL-SCNC: 117 MMOL/L
CO2 SERPL-SCNC: 21 MMOL/L
CREAT SERPL-MCNC: 1.5 MG/DL
DIFFERENTIAL METHOD: ABNORMAL
EOSINOPHIL # BLD AUTO: 0.2 K/UL
EOSINOPHIL NFR BLD: 2.9 %
ERYTHROCYTE [DISTWIDTH] IN BLOOD BY AUTOMATED COUNT: 13.6 %
EST. GFR  (AFRICAN AMERICAN): 39.6 ML/MIN/1.73 M^2
EST. GFR  (NON AFRICAN AMERICAN): 34.3 ML/MIN/1.73 M^2
GLUCOSE SERPL-MCNC: 88 MG/DL
HCT VFR BLD AUTO: 31.9 %
HGB BLD-MCNC: 9.5 G/DL
LYMPHOCYTES # BLD AUTO: 1.4 K/UL
LYMPHOCYTES NFR BLD: 18.8 %
MAGNESIUM SERPL-MCNC: 1.5 MG/DL
MCH RBC QN AUTO: 28.2 PG
MCHC RBC AUTO-ENTMCNC: 29.8 G/DL
MCV RBC AUTO: 95 FL
MONOCYTES # BLD AUTO: 0.7 K/UL
MONOCYTES NFR BLD: 9.9 %
NEUTROPHILS # BLD AUTO: 4.9 K/UL
NEUTROPHILS NFR BLD: 68.1 %
PLATELET # BLD AUTO: 307 K/UL
PMV BLD AUTO: 10.3 FL
POTASSIUM SERPL-SCNC: 4.1 MMOL/L
RBC # BLD AUTO: 3.37 M/UL
SODIUM SERPL-SCNC: 144 MMOL/L
WBC # BLD AUTO: 7.19 K/UL

## 2017-10-19 PROCEDURE — 97110 THERAPEUTIC EXERCISES: CPT

## 2017-10-19 PROCEDURE — 80048 BASIC METABOLIC PNL TOTAL CA: CPT

## 2017-10-19 PROCEDURE — 25000003 PHARM REV CODE 250: Performed by: STUDENT IN AN ORGANIZED HEALTH CARE EDUCATION/TRAINING PROGRAM

## 2017-10-19 PROCEDURE — 99232 SBSQ HOSP IP/OBS MODERATE 35: CPT | Mod: ,,, | Performed by: PHYSICAL MEDICINE & REHABILITATION

## 2017-10-19 PROCEDURE — 97530 THERAPEUTIC ACTIVITIES: CPT

## 2017-10-19 PROCEDURE — 12800000 HC REHAB SEMI-PRIVATE ROOM

## 2017-10-19 PROCEDURE — 97542 WHEELCHAIR MNGMENT TRAINING: CPT

## 2017-10-19 PROCEDURE — 97150 GROUP THERAPEUTIC PROCEDURES: CPT

## 2017-10-19 PROCEDURE — 97116 GAIT TRAINING THERAPY: CPT

## 2017-10-19 PROCEDURE — 92507 TX SP LANG VOICE COMM INDIV: CPT

## 2017-10-19 PROCEDURE — 36415 COLL VENOUS BLD VENIPUNCTURE: CPT

## 2017-10-19 PROCEDURE — 63600175 PHARM REV CODE 636 W HCPCS: Performed by: PHYSICAL MEDICINE & REHABILITATION

## 2017-10-19 PROCEDURE — 85025 COMPLETE CBC W/AUTO DIFF WBC: CPT

## 2017-10-19 PROCEDURE — 25000003 PHARM REV CODE 250: Performed by: PHYSICAL MEDICINE & REHABILITATION

## 2017-10-19 PROCEDURE — 83735 ASSAY OF MAGNESIUM: CPT

## 2017-10-19 RX ORDER — HYDROCORTISONE 1 %
CREAM (GRAM) TOPICAL 2 TIMES DAILY
Status: DISCONTINUED | OUTPATIENT
Start: 2017-10-19 | End: 2017-10-26 | Stop reason: HOSPADM

## 2017-10-19 RX ADMIN — PANCRELIPASE 1 CAPSULE: 60000; 12000; 38000 CAPSULE, DELAYED RELEASE PELLETS ORAL at 11:10

## 2017-10-19 RX ADMIN — ERLOTINIB HYDROCHLORIDE 150 MG: 150 TABLET, FILM COATED ORAL at 07:10

## 2017-10-19 RX ADMIN — LEVETIRACETAM 750 MG: 750 TABLET ORAL at 07:10

## 2017-10-19 RX ADMIN — Medication 400 MG: at 08:10

## 2017-10-19 RX ADMIN — CHLORHEXIDINE GLUCONATE 15 ML: 1.2 RINSE ORAL at 07:10

## 2017-10-19 RX ADMIN — LORAZEPAM 1 MG: 1 TABLET ORAL at 08:10

## 2017-10-19 RX ADMIN — DRONABINOL 5 MG: 2.5 CAPSULE ORAL at 06:10

## 2017-10-19 RX ADMIN — ATORVASTATIN CALCIUM 40 MG: 20 TABLET, FILM COATED ORAL at 07:10

## 2017-10-19 RX ADMIN — METOPROLOL SUCCINATE 50 MG: 50 TABLET, EXTENDED RELEASE ORAL at 07:10

## 2017-10-19 RX ADMIN — PANCRELIPASE 1 CAPSULE: 60000; 12000; 38000 CAPSULE, DELAYED RELEASE PELLETS ORAL at 05:10

## 2017-10-19 RX ADMIN — LEVETIRACETAM 750 MG: 750 TABLET ORAL at 08:10

## 2017-10-19 RX ADMIN — Medication 400 MG: at 07:10

## 2017-10-19 RX ADMIN — MICONAZOLE NITRATE: 20 CREAM TOPICAL at 07:10

## 2017-10-19 RX ADMIN — DRONABINOL 5 MG: 2.5 CAPSULE ORAL at 05:10

## 2017-10-19 RX ADMIN — HYDROCORTISONE: 10 CREAM TOPICAL at 02:10

## 2017-10-19 RX ADMIN — PANCRELIPASE 1 CAPSULE: 60000; 12000; 38000 CAPSULE, DELAYED RELEASE PELLETS ORAL at 07:10

## 2017-10-19 RX ADMIN — RIVAROXABAN 20 MG: 20 TABLET, FILM COATED ORAL at 06:10

## 2017-10-19 RX ADMIN — AMLODIPINE BESYLATE 5 MG: 5 TABLET ORAL at 07:10

## 2017-10-19 RX ADMIN — AMITRIPTYLINE HYDROCHLORIDE 50 MG: 25 TABLET, FILM COATED ORAL at 08:10

## 2017-10-19 NOTE — PROGRESS NOTES
Occupational Therapy   FIM Scores    [address]   MRN: [id number]      10/19/17 0830   Transfers   Bed/Chair/WC 4     LEOBARDO Bustillos   10/19/2017

## 2017-10-19 NOTE — PROGRESS NOTES
"Occupational Therapy  PM Group Session  [address]   MRN: [id number]      10/19/17 1500   OT Time Calculation   OT Start Time 1500   OT Stop Time 1545   OT Total Time (min) 45 min   General   OT Date of Treatment 10/19/17   Treatment/Billable Minutes   Therapeutic Group 45   Total Time 45   Subjective:  "I felt a pain in my head"      Pain level:    Pt did not rate pain    Objective:  Pt participated in 45 min stroke specific group to provide educational and emotional support to stroke survivors and caregivers. Pt educated on definition, risk factors, and prevalence of stroke. This group focused on warning signs of stroke (ex. Aphasia, healthy diet, adapted exercises) Pt also educated on stroke recovery process, community resources, and ways to integrate successfully back into community after discharge from rehab.       Goal(s):  Pt will verbalize 1 risk factor for stroke after attending group.  (Met/Not Met) Met  Pt will verbalize 1 change he/she plans to make to decrease risk of stroke. (Met/Not Met) Met  Pt will interact appropriately with 1(or more) group members during session to increase social interactions in preparation for community re-integration. (Met/Not Met) met      CAMILLE Guzman  10/19/2017        The LEOBARDO and MAZIN have collaborated and discussed the patient's status, treatment plan and progress toward established goals.     CAMILLE Guzman 10/19/2017     "

## 2017-10-19 NOTE — PROGRESS NOTES
"Speech Therapy   Treatment    [address] St   MRN: [id number]            10/19/17 1100   Speech Time Calculation   Speech Start Time 1100   Speech Stop Time 1145   Speech Total (min) 45 min   General Information   SLP Treatment Date 10/19/17   General Observations Patient seen while sitting upright in w/c in ST office.   General Precautions fall   Visual/Auditory Vision impaired   Subjective   Patient states "Evidently, it's getting better," reffering to pt's memory.   Pain/Comfort   Pain Rating no pain       SLP Cognitive Treatment   Treatment Detail (SLP Cognitive Treatment) Patient oriented x4 independently. Recalled detailed information from previous day, given min verbal prompting. Patient participated in paragraph (3-4 sentences) recall task to improve functional recall skills. Pt answered questions with 40% accuracy independently, 60% accuracy given mod verbal cues. In a calendar recall task, patient recalled information with 50% accuracy independently and given max verbal cues. In a similar task, patient recalled information from a to-do list with 70% accuracy independently. To improve problem solving skills, patient participated in cause/effect task. Pt identified potential outcomes of situations with 90% accuracy indly. Pt identified potential causes of situations with 70% accuracy indly, 90% accy given mod verbal cues.        SLP Treatment: Verbal Expression   Treatment Detail (SLP Verbal Expression) To improve word fluency and thought organization, pt participated in avrious naming tasks. Pt completed concrete convergent naming task with 90% accy independently, 100% accy given min verbal cues. In an abstract convergent naming task, patient identified similarity amond 3 words with 60% accy indly, 90% accy given max verbal cues. Pt demonstrated poor comprehension of task instructions as evidenced by pt's request for further explanation of task for each trial. In a concrete divergent naming task, pt " listed x9 occupations in 1 minute, x7 sports in 1 minute, and x9 musical instruments in 1 minute. Moderate verbal cues provided for each category.   Assessment   SLP Diagnosis mild-mod cog-ling deficits   Prognosis Good   Problem List decreased thought organization; decreased word fluency; decreased recall; decreased problem solving skills   Session Assessment progressing toward goals   Level of Motivation/Participation Good   Discharge Recommendations   Discharge Facility/Level Of Care Needs home with home health   Plan   Plan Continue with current plan   Therapy Frequency Monday-Friday   SLP Follow-up   SLP Follow-up? Yes   Treatment/Billable Minutes   Speech Therapy Individual 45   Total Time 45     FIM Scores  Comprehension:4  Expression: 3  Social Interaction:4  Problem Solving:3  Memory: 2     Comprehension:  Complex= humor, finances, rationale for medical treatment(hip precautions, pressure relief)  Basic= pain, hunger, thirst, bathroom needs, cold, nutrition, sleep     Understands basic 75-89%- may need words repeated (4)     Expression:  Expresses basic 50-74% of time - Needs to repeat parts of sentences  (3)     Social Interaction:  Interacts appropriately 75-89% of time - needs redirection for appropriate language or to initiate interaction  (4)     Problem Solving:  Solves basic problems 50-74% of the time  (3)     Memory:  Recognizes, recalls, or executes 25-49% of time 1 step of 2 step request  (2)     LEONEL Marcus-SLP  10/19/2017

## 2017-10-19 NOTE — PROGRESS NOTES
"Occupational Therapy   AM Treatment    [address]   MRN: [id number]      10/19/17 0830   OT Time Calculation   OT Start Time 0830   OT Stop Time 0915   OT Total Time (min) 45 min   General   OT Date of Treatment 10/19/17   Precautions   General Precautions fall   Visual/Auditory Vision impaired  (glasses)   Subjective   Patient states "I had a stroke right?"   Pain/Comfort   Pain Rating no pain   Transfers   Transfer yes   Sit to Stand   Sit <> Stand Assistance Contact Guard Assistance   Sit <> Stand Assistive Device Rolling Walker   Trials/Comments cues for hand placement, pt pulling herself up with B hands on walker   Stand to Sit   Assistance Contact Guard Assistance   Assistive Device Rolling Walker   Trials/Comments cues to reach back prior to sitting   Additional Activities   Additional Activities Comments  - Coordination activity: Pt tf'd bean bags to R/L of midline using R hand, pt dropped several bags then impulsively reaches down to floor unexpectedly without warning   - Pt stood at tabletop, sorted silverware with CGA from therapist due to posterior lean and cues to slow down pace of activity.      - Therex:   - Bilateral laura x 3# x 20 reps x 5 sets on incline board, pt used 5# weight on last set   Activity Tolerance   Activity Tolerance Patient tolerated treatment well   Medical Staff Made Aware NSG   After Treatment   Patient Position After Treatment Seated in wheelchair   Patient after treatment left call button in reach   Assessment   Prognosis Good   Problem List Decreased Self Care skills;Decreased upper extremity strength;Decreased safe judgment during ADL;Decreased cognition;Decreased endurance;Decreased fine motor control;Decreased functional mobility;Decreased gross motor control;Decreased sensation;Decreased IADLs;Decreased trunk control for functional activities;Decreased Function of right upper extremity   Assessment Pt showing improvement in standing balance today however still " needs cues for hand placement and safety as she is impulsive at times. Pt is improving with self care but remains limited by ataxia in RUE, as evidenced by pt frequently dropping items throughout session with R hand. Pt is motivated to therapy and would continue to benefit from rehab program.    Level of Motivation/Participation good   Discharge Recommendations   Equipment Needed After Discharge bath bench;walker, rolling;wheelchair   Discharge Facility/Level Of Care Needs home health OT   Plan   Plan Continue with current plan   Therapy Frequency 2 times/day;Monday-Friday;Saturday or Sunday   Occupational Therapy Follow-up   OT Follow-up? Yes   Treatment/Billable Minutes   Therapeutic Activity 30   Therapeutic Exercise 15   Total Time 45     LEOBARDO Bustillos   10/19/2017

## 2017-10-19 NOTE — PROGRESS NOTES
Physical Therapy   FIM Scores    [address]   MRN: [id number]          10/19/17 0945   Transfers   Bed/Chair/WC 4   Locomotion   Distance Walked 3   Distance Wheelchair 3   Walk 4   Wheelchair 5   Mode C   Stairs 4         DYLON Muñoz  10/19/2017        I certify that I was present in the room directing the student in service delivery and guided him/her using my skilled judgment. As the co-signing therapist, I have reviewed the student's documentation and am responsible for the treatment, assessment and plan.        Ruddy Sanchez, PT

## 2017-10-19 NOTE — PLAN OF CARE
Problem: Patient Care Overview  Goal: Plan of Care Review  Outcome: Ongoing (interventions implemented as appropriate)  Patient's careplan reviewed    Problem: Stroke (Ischemic) (Adult)  Goal: Signs and Symptoms of Listed Potential Problems Will be Absent, Minimized or Managed (Stroke)  Signs and symptoms of listed potential problems will be absent, minimized or managed by discharge/transition of care (reference Stroke (Ischemic) (Adult) CPG).   Outcome: Ongoing (interventions implemented as appropriate)  Patient has not had a stroke    Problem: Fall Risk (Adult)  Goal: Identify Related Risk Factors and Signs and Symptoms  Related risk factors and signs and symptoms are identified upon initiation of Human Response Clinical Practice Guideline (CPG)   Outcome: Ongoing (interventions implemented as appropriate)  Patient has not had a fall

## 2017-10-19 NOTE — PROGRESS NOTES
[facility]  Physical Medicine & Rehab  Progress Note    Patient Name: Naty St  MRN: [id number]  Patient Class: IP- Rehab   Admission Date: 10/11/2017  Length of Stay: 8 days  Attending Physician: Yordy Ponce MD  Primary Care Provider: Olvin Mras MD    Subjective:     Principal Problem: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    Interval History: Pt with complaints of rash on hands.  I have reviewed the HPI and PMFSH and there are no changes from admission.       Scheduled Medications:    amitriptyline  50 mg Oral QHS    amlodipine  5 mg Oral Daily    atorvastatin  40 mg Oral Daily    benzocaine   Mouth/Throat BID    chlorhexidine  15 mL Mouth/Throat BID    dronabinol  5 mg Oral BID AC    erlotinib  150 mg Oral QHS    hydrocortisone   Topical BID    levetiracetam  750 mg Oral BID    lipase-protease-amylase 12,000-38,000-60,000 units  1 capsule Oral TID WM    magnesium oxide  400 mg Oral BID    metoprolol succinate  50 mg Oral Daily    miconazole   Topical (Top) BID    rivaroxaban  20 mg Oral Daily with dinner    senna-docusate 8.6-50 mg  1 tablet Oral BID       PRN Medications: bisacodyl, influenza, lorazepam, magnesium hydroxide 400 mg/5 ml, ondansetron, pneumoc 13-marcus conj-dip cr(PF), promethazine, ramelteon    Review of Systems   Constitutional: Negative for chills, fatigue and fever.   HENT: Negative for trouble swallowing and voice change.    Eyes: Negative for photophobia and visual disturbance.   Respiratory: Negative for cough, shortness of breath and wheezing.    Cardiovascular: Negative for chest pain and palpitations.   Gastrointestinal: Negative for abdominal distention and nausea.   Genitourinary: Negative for difficulty urinating and flank pain.   Musculoskeletal: Positive for gait problem. Negative for arthralgias.   Skin: Negative for color change and rash.   Neurological: Positive for speech difficulty and weakness.  Negative for facial asymmetry, numbness and headaches.   Psychiatric/Behavioral: Negative for agitation and confusion.     Objective:     Vital Signs (Most Recent):  Temp: 98.8 °F (37.1 °C) (10/19/17 075)  Pulse: 79 (10/19/17 075)  Resp: 16 (10/19/17 0751)  BP: 125/62 (10/19/17 075)  SpO2: 95 % (10/19/17 0751)    Vital Signs (24h Range):  Temp:  [98 °F (36.7 °C)-98.8 °F (37.1 °C)] 98.8 °F (37.1 °C)  Pulse:  [65-79] 79  Resp:  [16-18] 16  SpO2:  [95 %-98 %] 95 %  BP: (125-142)/(62-66) 125/62     Physical Exam   Constitutional: She appears well-developed and well-nourished.   HENT:   Head: Normocephalic and atraumatic.   Sores on superior palate   Eyes: EOM are normal. Pupils are equal, round, and reactive to light.   Neck: Normal range of motion.   Cardiovascular: Normal rate and regular rhythm.    Pulmonary/Chest: Effort normal. No respiratory distress.   Abdominal: Soft. There is no tenderness.   Musculoskeletal: Normal range of motion. She exhibits no deformity.   Neurological:   -  Mental Status:  AAOx3.  Follows commands.  Answers correct age and .  Recent and remote memory intact.  No neglect.    -  Speech and language:  - aphasia + dysarthria.    -  Coordination:  Finger to nose exam:  RUE mild dysmetria, LUE dysmetria.   -  Motor:  RUE: 4/5, 4/5 .  LUE: 5/5, 5/5 .  RLE: 5/5, DF 5/5, PF 5/5.  LLE: 5/5, DF 5/5, PF 5/5.   -  pronator drift. negative  -  Tone:  normal  -  Sensory:  Intact to light touch and pin prick.       Skin: Skin is warm and dry.   Two scabs noted with some redness over occiput area   Psychiatric: She has a normal mood and affect. Her behavior is normal.   Vitals reviewed.    NEUROLOGICAL EXAMINATION:     CRANIAL NERVES     CN III, IV, VI   Pupils are equal, round, and reactive to light.  Extraocular motions are normal.       Assessment/Plan:      Naty St is a 72 y.o. female admitted to inpatient rehabilitation on 10/11/2017 for Cerebral infarction due to  embolism of right cerebellar artery with impaired mobility and ADLs. Patient remains appropriate for PT, OT, and as required Speech therapy. Patient continues to require 24 hour nursing care as well as daily Physician assessment.    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 Continue working with PT/OT        CKD (chronic kidney disease) stage 3, GFR 30-59 ml/min    Uptrending. Will continue to monitor. Encouraged PO fluids.        Hypomagnesemia    1.7 today. Continue Mag Oxide 400mg BID. Continue to monitor        Essential hypertension    Controlled. Continue meds.            DISCHARGE PLANNING:  Tentative Discharge Date: 10/26/2017    Future Appointments  Date Time Provider Department Center   11/9/2017 9:30 AM [facility] PET CT LIMIT 500 LBS [facility] CT [facility]   11/9/2017 2:00 PM Pablo Lr MD [facility] STROKE Reece ruben   11/10/2017 9:30 AM LAB, HEMONC CANCER BLDG [facility] HO Sanchez Nishant   11/10/2017 10:30 AM Karrie Vazquez MD [facility] HEM ONC Daneil Dillard   11/10/2017 11:30 AM INJECTION, [facility] INFUSION [facility] CHEMO Daniel Dillard   12/5/2017 1:30 PM [facility]  Department of Physical Medicine & Rehab   [facility]

## 2017-10-19 NOTE — PROGRESS NOTES
"Physical Therapy   Treatment    Naty St   MRN: [id number]        10/19/17 0945   PT Time Calculation   PT Start Time 0945   PT Stop Time 1030   PT Total Time (min) 45 min   Treatment   Treatment Type Treatment   PT/PTA PT   General   PT Received On 10/19/17   Family/Caregiver Present No   Patient Found (position) Seated in wheelchair  (in room)   Pt found with (posey belt in place; bandages on digits of R hand)   Precautions   General Precautions fall   Visual/Auditory Vision impaired  (glasses)   Subjective   Patient states "My hands keep bleeding"   Pain/Comfort   Pain Rating 1 no pain   Pain Rating Post-Intervention 1 no pain   Bed Mobility   Bed Mobility no   Transfers   Transfer yes   Sit to Stand   Sit <> Stand Assistance Minimum Assistance;Contact Guard Assistance   Sit <> Stand Assistive Device No Assistive Device   Trials/Comments Pt performed numerous trials from w/c and mat  (refer to other activities section for full exercise)   Stand to Sit   Assistance Minimum Assistance;Contact Guard Assistance   Assistive Device No Assistive Device   Trials/Comments Pt performed numerous trials onto w/c and mat  (refer to other activities section for full exercise)   Chair to Mat   Chair<> Mat Technique Stand Pivot   Chair<>Mat Assistance Contact Guard Assistance   Chair <> Mat Assistive Device No Assistive Device  (gait belt donned)   Trials/Comments 1 trial~ guidance to mat and cues to slow down movement   Mat to Chair   Technique Stand Pivot   Assistance Contact Guard Assistance   Assistive Device No Assistive Device  (gait belt donned)   Trials/Comments 1 trial~ guidance to chair w/ cues to slow down movement   Wheelchair Activities   Propulsion Yes   Propulsion Type 1 Manual   Level 1 Level tile   Method 1 Right lower extremity;Left lower extremity  (B hands with cracked skin and pt reported discomfort)   Level of Assistance 1 Stand by assistsance   Description/ Details 1 pt propels w/c ~200 ft w/ " SBA and navigational cues  (w/c cushion removed for activity)   Gait   Gait Yes   Weight Bearing Status full   Gait 1   Surface 1 Level tile   Gait Assistive Device Rolling walker   Other Apparatus 1 Other (Comment)  (gait belt)   Assistance 1 Contact Guard Assistance   Gait Distance Pt ambulated 209 ft w/ RW + CGA and no episodes of LOB.  Pt required cues for slowing down movements during turns and for navigation.   Gait Pattern swing-through gait   Gait Deviation(s) decreased leila;increased time in double stance;decreased velocity of limb motion;decreased step length;decreased stride length;decreased weight-shifting ability;lateral lean;forward lean  (lateral lean to R)   Impairments Contributing to Gait Deviations impaired balance;impaired coordination;impaired motor control;impaired postural control;decreased strength;other (see comments)  (distractibility)   Stairs   Stairs Yes   Stairs/Curb Step   Rails 1 Bilateral   Device 1 No device   Other Apparatus 1 Other (Comment)  (gait belt)   Assistance 1 Minimum assistance;Contact guard   Comment/# Steps 1 pt ascends and descends 12 steps w/ B rails; CGA for ascending and min A/CGA for descending  (Cueing for forward trunk lean and hand placement for descent)   Balance   Balance Yes   Other Activities   Other Activities Other (Comment)   Comments Pt performed sequence of sit>stand, take forward step, move ring on ring tree, step backward, and stand > sit for 8 repetitions.  Pt also performed task w/ alternating LE onto small step w/ static standing w/o UE support, L hand supported on table, L hand holding therapy pole for ~20 repetitions.  (Pt performed above tasks w/ min A/CGA)   Activity Tolerance   Activity Tolerance Patient tolerated treatment well   After Treatment   Patient Position After Treatment Seated in wheelchair  (in room)   Patient after treatment left call button in reach  (posey belt in place)   Assessment   Prognosis Good   Problem List Decreased  strength;Decreased endurance;Impaired balance;Decreased mobility;Decreased coordination;Decreased cognition;Decreased safety awareness;Impaired vision   Session Assessment progressing toward goals   Assessment Pt did well today, and required less cues to focus on task during w/c propulsion and ambulation w/ RW.  Pt continues to display impulsive behavior w/ transfers, and requires cueing to slow down movements and wait for instructions.  Pt's deficits include impulsivity, ataxia, decreased postural control, and decreased coordination.  Pt will likely require assistance w/ mobility when discharged due to impaired cognition and decreased balance.  Pt continues to slowly progress towards goal completion w/ inpatient rehab.   Level of Motivation/Participation Good   Barriers to Discharge None   Barriers to Discharge Comments Good family support   Discharge Recommendations   Equipment Needed After Discharge bath bench;walker, rolling;wheelchair   Discharge Facility/Level Of Care Needs outpatient PT   Plan   Planned Therapy Intervention Continue with current plan;Bed mobility training;Transfer training;Gait training;Balance Training;Strengthening;Neuromuscular Re-education;Motor Coordination Training;Postural Re-education;Wheelchair Management/Propulsion   Therapy Frequency 2 times/day;daily;Monday-Friday;Saturday or Sunday   Physical Therapy Follow-up   PT Follow-up? Yes   PT - Next Visit Date 10/20/17   Treatment/Billable Minutes   Gait training 13   Therapeutic Activity 22   Train/Wheelchair Management 10   Total Time 45         Dianna Frankel, SPT  10/19/2017        I certify that I was present in the room directing the student in service delivery and guided him/her using my skilled judgment. As the co-signing therapist, I have reviewed the student's documentation and am responsible for the treatment, assessment and plan.        Ruddy Sanchez, PT

## 2017-10-19 NOTE — SUBJECTIVE & OBJECTIVE
Interval History: Pt with complaints of rash on hands.  [facility] and [facility] from [facility].       Scheduled Medications:    amitriptyline  50 mg Oral QHS    amlodipine  5 mg Oral Daily    atorvastatin  40 mg Oral Daily    benzocaine   Mouth/Throat BID    chlorhexidine  15 mL Mouth/Throat BID    dronabinol  5 mg Oral BID AC    erlotinib  150 mg Oral QHS    hydrocortisone   Topical BID    levetiracetam  750 mg Oral BID    lipase-protease-amylase 12,000-38,000-60,000 units  1 capsule Oral TID WM    magnesium oxide  400 mg Oral BID    metoprolol succinate  50 mg Oral Daily    miconazole   Topical (Top) BID    rivaroxaban  20 mg Oral Daily with dinner    senna-docusate 8.6-50 mg  1 tablet Oral BID       PRN Medications: bisacodyl, influenza, lorazepam, magnesium hydroxide 400 mg/5 ml, ondansetron, pneumoc 13-marcus conj-dip cr(PF), promethazine, ramelteon    Review of Systems   Constitutional: Negative for chills, fatigue and fever.   HENT: Negative for trouble swallowing and voice change.    Eyes: Negative for photophobia and visual disturbance.   Respiratory: Negative for cough, shortness of breath and wheezing.    Cardiovascular: Negative for chest pain and palpitations.   Gastrointestinal: Negative for abdominal distention and nausea.   Genitourinary: Negative for difficulty urinating and flank pain.   Musculoskeletal: Positive for gait problem. Negative for arthralgias.   Skin: Negative for color change and rash.   Neurological: Positive for speech difficulty and weakness. Negative for facial asymmetry, numbness and headaches.   Psychiatric/Behavioral: Negative for agitation and confusion.     Objective:     Vital Signs (Most Recent):  Temp: 98.8 °F (37.1 °C) (10/19/17 0751)  Pulse: 79 (10/19/17 0751)  Resp: 16 (10/19/17 0751)  BP: 125/62 (10/19/17 0751)  SpO2: 95 % (10/19/17 0751)    Vital Signs (24h Range):  Temp:  [98 °F (36.7 °C)-98.8 °F (37.1 °C)] 98.8 °F (37.1  °C)  Pulse:  [65-79] 79  Resp:  [16-18] 16  SpO2:  [95 %-98 %] 95 %  BP: (125-142)/(62-66) 125/62     Physical Exam   Constitutional: She appears well-developed and well-nourished.   HENT:   Head: Normocephalic and atraumatic.   Sores on superior palate   Eyes: EOM are normal. Pupils are equal, round, and reactive to light.   Neck: Normal range of motion.   Cardiovascular: Normal rate and regular rhythm.    Pulmonary/Chest: Effort normal. No respiratory distress.   Abdominal: Soft. There is no tenderness.   Musculoskeletal: Normal range of motion. She exhibits no deformity.   Neurological:   -  Mental Status:  AAOx3.  Follows commands.  Answers correct age and .  Recent and remote memory intact.  No neglect.    -  Speech and language:  - aphasia + dysarthria.    -  Coordination:  Finger to nose exam:  RUE mild dysmetria, LUE dysmetria.   -  Motor:  RUE: 4/5, 4/5 .  LUE: 5/5, 5/5 .  RLE: 5/5, DF 5/5, PF 5/5.  LLE: 5/5, DF 5/5, PF 5/5.   -  pronator drift. negative  -  Tone:  normal  -  Sensory:  Intact to light touch and pin prick.       Skin: Skin is warm and dry.   Two scabs noted with some redness over occiput area   Psychiatric: She has a normal mood and affect. Her behavior is normal.   Vitals reviewed.    NEUROLOGICAL EXAMINATION:     CRANIAL NERVES     CN III, IV, VI   Pupils are equal, round, and reactive to light.  Extraocular motions are normal.

## 2017-10-20 PROCEDURE — 97542 WHEELCHAIR MNGMENT TRAINING: CPT

## 2017-10-20 PROCEDURE — 97150 GROUP THERAPEUTIC PROCEDURES: CPT

## 2017-10-20 PROCEDURE — 92507 TX SP LANG VOICE COMM INDIV: CPT

## 2017-10-20 PROCEDURE — 99232 SBSQ HOSP IP/OBS MODERATE 35: CPT | Mod: ,,, | Performed by: PHYSICAL MEDICINE & REHABILITATION

## 2017-10-20 PROCEDURE — 97112 NEUROMUSCULAR REEDUCATION: CPT

## 2017-10-20 PROCEDURE — 25000003 PHARM REV CODE 250: Performed by: STUDENT IN AN ORGANIZED HEALTH CARE EDUCATION/TRAINING PROGRAM

## 2017-10-20 PROCEDURE — 97110 THERAPEUTIC EXERCISES: CPT

## 2017-10-20 PROCEDURE — 97116 GAIT TRAINING THERAPY: CPT

## 2017-10-20 PROCEDURE — 25000003 PHARM REV CODE 250: Performed by: PHYSICAL MEDICINE & REHABILITATION

## 2017-10-20 PROCEDURE — 12800000 HC REHAB SEMI-PRIVATE ROOM

## 2017-10-20 PROCEDURE — 63600175 PHARM REV CODE 636 W HCPCS: Performed by: PHYSICAL MEDICINE & REHABILITATION

## 2017-10-20 RX ADMIN — PANCRELIPASE 1 CAPSULE: 60000; 12000; 38000 CAPSULE, DELAYED RELEASE PELLETS ORAL at 12:10

## 2017-10-20 RX ADMIN — ATORVASTATIN CALCIUM 40 MG: 20 TABLET, FILM COATED ORAL at 08:10

## 2017-10-20 RX ADMIN — PANCRELIPASE 1 CAPSULE: 60000; 12000; 38000 CAPSULE, DELAYED RELEASE PELLETS ORAL at 08:10

## 2017-10-20 RX ADMIN — DRONABINOL 5 MG: 2.5 CAPSULE ORAL at 06:10

## 2017-10-20 RX ADMIN — Medication 400 MG: at 08:10

## 2017-10-20 RX ADMIN — AMLODIPINE BESYLATE 5 MG: 5 TABLET ORAL at 09:10

## 2017-10-20 RX ADMIN — ERLOTINIB HYDROCHLORIDE 150 MG: 150 TABLET, FILM COATED ORAL at 08:10

## 2017-10-20 RX ADMIN — HYDROCORTISONE: 10 CREAM TOPICAL at 08:10

## 2017-10-20 RX ADMIN — STANDARDIZED SENNA CONCENTRATE AND DOCUSATE SODIUM 1 TABLET: 8.6; 5 TABLET, FILM COATED ORAL at 08:10

## 2017-10-20 RX ADMIN — LORAZEPAM 1 MG: 1 TABLET ORAL at 08:10

## 2017-10-20 RX ADMIN — RIVAROXABAN 20 MG: 20 TABLET, FILM COATED ORAL at 05:10

## 2017-10-20 RX ADMIN — DRONABINOL 5 MG: 2.5 CAPSULE ORAL at 05:10

## 2017-10-20 RX ADMIN — PANCRELIPASE 1 CAPSULE: 60000; 12000; 38000 CAPSULE, DELAYED RELEASE PELLETS ORAL at 05:10

## 2017-10-20 RX ADMIN — LEVETIRACETAM 750 MG: 750 TABLET ORAL at 08:10

## 2017-10-20 RX ADMIN — CHLORHEXIDINE GLUCONATE 15 ML: 1.2 RINSE ORAL at 08:10

## 2017-10-20 RX ADMIN — METOPROLOL SUCCINATE 50 MG: 50 TABLET, EXTENDED RELEASE ORAL at 09:10

## 2017-10-20 RX ADMIN — AMITRIPTYLINE HYDROCHLORIDE 50 MG: 25 TABLET, FILM COATED ORAL at 08:10

## 2017-10-20 RX ADMIN — LEVETIRACETAM 750 MG: 750 TABLET ORAL at 10:10

## 2017-10-20 NOTE — PROGRESS NOTES
Occupational Therapy   FIM Scores    Naty Lopez Maciel   MRN: [id number]      10/20/17 1300   Transfers   Bed/Chair/WC 4   Self Care   Dressing-Lower 5     LEOBARDO Bustillos   10/20/2017

## 2017-10-20 NOTE — PROGRESS NOTES
"Occupational Therapy   Treatment    Naty St   MRN: [id number]      10/20/17 1300   OT Time Calculation   OT Start Time 1300   OT Stop Time 1345   OT Total Time (min) 45 min   General   OT Date of Treatment 10/20/17   Patient Found (position) Seated in wheelchair   Precautions   General Precautions fall   Visual/Auditory Vision impaired  (glasses)   Subjective   Patient states "What did you want me to do?"   Pain/Comfort   Pain Rating no pain   Transfers   Transfer yes   Sit to Stand   Sit <> Stand Assistance Contact Guard Assistance   Sit <> Stand Assistive Device Rolling Walker   Stand to Sit   Assistance Contact Guard Assistance   Assistive Device Rolling Walker   Bed to Chair   Bed <> Chair Technique Stand Pivot   Bed <> Chair Transfer Assistance Contact Guard Assistance   Bed <> Chair Assistive Device Rolling Walker   Chair to Mat   Chair <>Mat Technique Stand Pivot   Chair <> Mat Assistance Contact Guard Assistance   Chair<> Mat Assistive Device Rolling Walker   LE Dressing   Shoe Level of Assistance Supervision   LE Dressing Comments increased time to tie shoes   Additional Activities:     Therex:   - Rickshaw x 10# x 20 reps x 4 sets    - 4# x 20 reps x 1 set shoulder raises    Standing Balance:    - Pt stood at countertop, tf'd grocery items from overhead cabinet using R hand only with CGA    - Dynamic reaching task standing at [employer] screen with RW and CGA x 5 min   Activity Tolerance   Activity Tolerance Patient tolerated treatment well   Medical Staff Made Aware NSG   After Treatment   Patient Position After Treatment Seated in wheelchair   Patient after treatment left call button in reach   Assessment   Prognosis Good   Assessment Pt showing improvement in standing balance today however still needs cues for hand placement and safety as she is impulsive at times. Pt remains at high risk for falls.    Level of Motivation/Participation good   Discharge Recommendations   Equipment Needed " After Discharge bath bench;walker, rolling;wheelchair   Discharge Facility/Level Of Care Needs home health OT   Plan   Plan Continue with current plan   Therapy Frequency 2 times/day;Monday-Friday;Saturday or Sunday   Occupational Therapy Follow-up   OT Follow-up? Yes   Treatment/Billable Minutes   Therapeutic Exercise 15   Neuromuscular Re-ed 30   Total Time 45     LEOBARDO Bustillos   10/20/2017

## 2017-10-20 NOTE — PROGRESS NOTES
"Occupational Therapy   Volleyball Group    Naty St   MRN: [id number]     Subjective: "Ok right now?"    Pain level:              0/10                         How was pain addressed:      Precautions:   Fall    Objective:   -The patient performed volleyball group at w/c level with SBA assist  The patient required no rest breaks during this activity.  Patient performed activity using bilateral upper extremities to volley the ball, lateral arm movement noted throughout the activity.  Endurance 13 on the RPE scale. no pain complaints voiced or observed.  -Social Interaction: (choose FIM score rating below)      - Interacts appropriately with others with medication or extra time(anti-anxiety, antidepressant)  (6)      Goal:  pt. will improve social interaction to a FIM of 6     Assessment:  Patient participated in a 45 minute volleyball group activity.  The group activity challenged dynamic standing/sitting balance, core strengthening, bilateral upper extremity strengthening, cardiovascular and respiratory capacity, hand -eye coordination, visual scanning and social affect/mood.              10/20/17 1115   OT Time Calculation   OT Start Time 1115   OT Stop Time 1200   OT Total Time (min) 45 min   General   OT Date of Treatment 10/20/17   Plan   Plan Continue with current plan   Occupational Therapy Follow-up   OT Follow-up? Yes   Treatment/Billable Minutes   Therapeutic Group 45   Total Time 45     LEOBARDO Bustillos   10/20/2017      "

## 2017-10-20 NOTE — PROGRESS NOTES
Speech Therapy   Treatment    Naty St   MRN: [id number]        10/20/17 1500   Speech Time Calculation   Speech Start Time 1500   Speech Stop Time 1545   Speech Total (min) 45 min   General Information   SLP Treatment Date 10/20/17   General Observations Pt seen in ST office - alert and motivated t/o session. Pt accompanied by [facility] volunteer, Ariana. Pt engaged in conversation with SLP re home health setting - pt asked and answered questions re home health with no difficulty.     General Precautions fall   Visual/Auditory Vision impaired  (glasses)   Pain/Comfort   Pain Rating no pain       SLP Cognitive Treatment   Treatment Detail (SLP Cognitive Treatment) Oriented x4 independently. Pt recalled recent events (i.e. PT/OT session, volleyball game, husbands recent ED admit, medical hx, recent visitors) with no difficulty. Pt completed concrete category exclusion task given FO5 to improve thought organization and recall - pt completed task with 60% accy independently, 70% with repetitions - repetitions needed to assist with recall.        SLP Treatment: Verbal Expression   Treatment Detail (SLP Verbal Expression) Pt completed concrete divergent naming task naming holidays x15, kitchen items x12 each under one minute - pt needed semantic cues x2 across task to assist with word finding.    Assessment   SLP Diagnosis mild-mod cog-ling deficits   Prognosis Good   Problem List decreased memory   Session Assessment progressing toward goals   Level of Motivation/Participation good   Discharge Recommendations   Discharge Facility/Level Of Care Needs home health speech therapy   Plan   Plan Continue with current plan   SLP Follow-up   SLP Follow-up? Yes   Treatment/Billable Minutes   Speech Therapy Individual 45   Total Time 45   FIM Scores  Comprehension:4  Expression: 3  Social Interaction:4  Problem Solving:3  Memory: 2     Comprehension:  Complex= humor, finances, rationale for medical treatment(hip  precautions, pressure relief)  Basic= pain, hunger, thirst, bathroom needs, cold, nutrition, sleep     Understands basic 75-89%- may need words repeated (4)     Expression:  Expresses basic 50-74% of time - Needs to repeat parts of sentences  (3)     Social Interaction:  Interacts appropriately 75-89% of time - needs redirection for appropriate language or to initiate interaction  (4)     Problem Solving:  Solves basic problems 50-74% of the time  (3)     Memory:  Recognizes, recalls, or executes 25-49% of time 1 step of 2 step request  (2)      LEONEL Terrell-SLP  10/20/2017

## 2017-10-20 NOTE — PROGRESS NOTES
[facility]  Physical Medicine & Rehab  Progress Note    Patient Name: Naty St  MRN: [id number]  Patient Class: IP- Rehab   Admission Date: 10/11/2017  Length of Stay: 9 days  Attending Physician: Yordy Ponce MD  Primary Care Provider: Olvin Mars MD    Subjective:     Principal Problem: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    Interval History: Pt without new c/o's. States hands are getting better. I have reviewed the HPI and PMFSH and there are no changes from admission.       Scheduled Medications:    amitriptyline  50 mg Oral QHS    amlodipine  5 mg Oral Daily    atorvastatin  40 mg Oral Daily    benzocaine   Mouth/Throat BID    chlorhexidine  15 mL Mouth/Throat BID    dronabinol  5 mg Oral BID AC    erlotinib  150 mg Oral QHS    hydrocortisone   Topical BID    levetiracetam  750 mg Oral BID    lipase-protease-amylase 12,000-38,000-60,000 units  1 capsule Oral TID WM    magnesium oxide  400 mg Oral BID    metoprolol succinate  50 mg Oral Daily    miconazole   Topical (Top) BID    rivaroxaban  20 mg Oral Daily with dinner    senna-docusate 8.6-50 mg  1 tablet Oral BID       PRN Medications: bisacodyl, influenza, lorazepam, magnesium hydroxide 400 mg/5 ml, ondansetron, pneumoc 13-marcus conj-dip cr(PF), promethazine, ramelteon    Review of Systems   Constitutional: Negative for chills, fatigue and fever.   HENT: Negative for trouble swallowing and voice change.    Eyes: Negative for photophobia and visual disturbance.   Respiratory: Negative for cough, shortness of breath and wheezing.    Cardiovascular: Negative for chest pain and palpitations.   Gastrointestinal: Negative for abdominal distention and nausea.   Genitourinary: Negative for difficulty urinating and flank pain.   Musculoskeletal: Positive for gait problem. Negative for arthralgias.   Skin: Negative for color change and rash.   Neurological: Positive for speech difficulty  and weakness. Negative for facial asymmetry, numbness and headaches.   Psychiatric/Behavioral: Negative for agitation and confusion.     Objective:     Vital Signs (Most Recent):  Temp: 98.2 °F (36.8 °C) (10/20/17 0630)  Pulse: 69 (10/20/17 0630)  Resp: 20 (10/20/17 0630)  BP: (!) 141/66 (10/20/17 0904)  SpO2: 95 % (10/20/17 0630)    Vital Signs (24h Range):  Temp:  [98.2 °F (36.8 °C)-98.5 °F (36.9 °C)] 98.2 °F (36.8 °C)  Pulse:  [69-84] 69  Resp:  [18-20] 20  SpO2:  [95 %] 95 %  BP: (140-141)/(63-66) 141/66     Physical Exam   Constitutional: She appears well-developed and well-nourished.   HENT:   Head: Normocephalic and atraumatic.   Sores on superior palate   Eyes: EOM are normal. Pupils are equal, round, and reactive to light.   Neck: Normal range of motion.   Cardiovascular: Normal rate and regular rhythm.    Pulmonary/Chest: Effort normal. No respiratory distress.   Abdominal: Soft. There is no tenderness.   Musculoskeletal: Normal range of motion. She exhibits no deformity.   Neurological:   -  Mental Status:  AAOx3.  Follows commands.  Answers correct age and .  Recent and remote memory intact.  No neglect.    -  Speech and language:  - aphasia + dysarthria.    -  Coordination:  Finger to nose exam:  RUE mild dysmetria, LUE dysmetria.   -  Motor:  RUE: 4/5, 4/5 .  LUE: 5/5, 5/5 .  RLE: 5/5, DF 5/5, PF 5/5.  LLE: 5/5, DF 5/5, PF 5/5.   -  pronator drift. negative  -  Tone:  normal  -  Sensory:  Intact to light touch and pin prick.       Skin: Skin is warm and dry.   Two scabs noted with some redness over occiput area   Psychiatric: She has a normal mood and affect. Her behavior is normal.   Vitals reviewed.    NEUROLOGICAL EXAMINATION:     CRANIAL NERVES     CN III, IV, VI   Pupils are equal, round, and reactive to light.  Extraocular motions are normal.       Assessment/Plan:      Naty St is a 72 y.o. female admitted to inpatient rehabilitation on 10/11/2017 for Cerebral infarction  due to embolism of right cerebellar artery with impaired mobility and ADLs. Patient remains appropriate for PT, OT, and as required Speech therapy. Patient continues to require 24 hour nursing care as well as daily Physician assessment.    * 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    PT/OT/SLP      Transfers   Bed/Chair/WC 4   Locomotion   Distance Walked 3   Distance Wheelchair 3   Walk 4   Wheelchair 5   Mode C   Stairs 4             Folliculitis    Miconazole ordered BID        Ataxia    Likely 2/2 stroke. Continue working with PT/OT        CKD (chronic kidney disease) stage 3, GFR 30-59 ml/min    Uptrending. Will continue to monitor. Encouraged PO fluids.        Hypomagnesemia    1.7 today. Continue Mag Oxide 400mg BID. Continue to monitor        Essential hypertension    Controlled. Continue meds.            DISCHARGE PLANNING:  Tentative Discharge Date: 10/26/2017    Future Appointments  Date Time Provider Department Center   11/9/2017 9:30 AM [facility] PET CT LIMIT 500 LBS [facility] CT [facility]   11/9/2017 2:00 PM Pablo Lr MD [facility] STROKE Reece ruben   11/10/2017 9:30 AM LAB, HEMONC CANCER BLDG [facility] LAB HO Daniel Dillard   11/10/2017 10:30 AM Karrie Vazquez MD [facility] HEM ONC Daniel Dillard   11/10/2017 11:30 AM INJECTION, [facility] INFUSION [facility] CHEMO Daniel Dillard   12/5/2017 1:30 PM [facility] MRI2 [facility] MRI Reece ruben Ponce MD  Department of Physical Medicine & Rehab   [facility]

## 2017-10-20 NOTE — PLAN OF CARE
Problem: Patient Care Overview  Goal: Plan of Care Review  Outcome: Ongoing (interventions implemented as appropriate)  Patient has denied any pain this shift. Patient is up ambulatory with assist with no falls or injuries noted. Patient is stable with no distress noted. Plan of care explained to patient and verbal acknowledgement of understanding from patient. Patient is resting with call light in reach, bed in low,locked position with bed alarm set. Will continue to monitor.

## 2017-10-20 NOTE — PROGRESS NOTES
Physical Therapy   FIM Scores    Naty St   MRN: [id number]            10/20/17 0900   Locomotion   Distance Walked 3   Distance Wheelchair 3   Walk 4   Wheelchair 5   Mode C       Dianna Frankel, [facility]   10/20/2017        I certify that I was present in the room directing the student in service delivery and guided him/her using my skilled judgment. As the co-signing therapist, I have reviewed the student's documentation and am responsible for the treatment, assessment and plan.        Ruddy Sanchez, PT

## 2017-10-20 NOTE — PROGRESS NOTES
"Physical Therapy   Treatment    Naty St   MRN: [id number]          10/20/17 0903   PT Time Calculation   PT Start Time 0903   PT Stop Time 0948   PT Total Time (min) 45 min   Treatment   Treatment Type Treatment   PT/PTA PT   General   PT Received On 10/20/17   Family/Caregiver Present No   Patient Found (position) Seated in wheelchair  (in room)   Pt found with (RN in room)   Precautions   General Precautions fall   Visual/Auditory Vision impaired  (wears glasses)   Subjective   Patient states "My  was in the emergency room last night"   Pain/Comfort   Pain Rating 1 no pain   Pain Rating Post-Intervention 1 no pain   Bed Mobility   Bed Mobility no   Transfers   Transfer yes   Sit to Stand   Sit <> Stand Assistance Minimum Assistance;Contact Guard Assistance   Sit <> Stand Assistive Device No Assistive Device   Trials/Comments Pt performed multiple trials from w/c and bouncy chair  (see other activities section for details)   Stand to Sit   Assistance Minimum Assistance;Contact Guard Assistance   Assistive Device No Assistive Device   Trials/Comments Pt performed multiple trials onto w/c and bouncy chair  (see other activities section for details)   Wheelchair Activities   Propulsion Yes   Propulsion Type 1 Manual   Level 1 Level tile   Method 1 Right lower extremity;Left lower extremity   Level of Assistance 1 Stand by assistsance;Supervision   Description/ Details 1 pt propels w/c ~200 ft w/ B LE w/ occasional cueing for topographical assistance  (seat cushion was removed for this exercise)   Gait   Gait Yes   Weight Bearing Status full   Gait 1   Surface 1 Level tile   Gait Assistive Device Rolling walker   Other Apparatus 1 Other (Comment)  (gait belt)   Assistance 1 Contact Guard Assistance;Minimum assistance   Gait Distance Pt ambulated 276 ft w/ RW w/ CGA and min A for turns.  Towards the end of trial, pt began to fatigue and had decreased foot clearance along w/ bilateral adduction.  Pt " "required cueing to slow down movements and management of RW during turns.   Gait Pattern swing-through gait   Gait Deviation(s) decreased leila;increased time in double stance;decreased velocity of limb motion;decreased step length;decreased stride length;decreased toe-to-floor clearance;decreased weight-shifting ability;lateral lean;forward lean  (lateral lean to R)   Impairments Contributing to Gait Deviations impaired balance;impaired coordination;impaired motor control;impaired postural control;decreased strength   Stairs   Stairs Yes   Stairs/Curb Step   Device 1 Rolling Walker   Other Apparatus 1 Other (Comment)  (gait belt)   Assistance 1 Contact guard;Minimum assistance   Comment/# Steps 1 pt ascends/descends 4" curb x 2 trials w/ CGA and cues for technique/RW management. Pt had one episode of LOB during turn to w/c but recovered w/ min A.   Balance   Balance No   Other Activities   Other Activities Other (Comment)   Comments Pt performed multiple trials of sit <> stand from bouncy chair w/o UE support, w/ hands on table, and w/ hands on therapy pole.  Pt required cueing for forward trunk lean and knee flexion for stand > sit( min/CGA for most transitions).  Pt then transfered from bouncy chair to w/c by stand-pivot w/ CGA.  Pt then caught/threw beach ball seated in w/c w/ both UEs w/ SPT for ~3 min at varying speeds for UE coordination.   Activity Tolerance   Activity Tolerance Patient tolerated treatment well   Other Comments   Comments Pt had little cuts on R hand, and PT donned bandage and latex-free gloves and were kept on throughout the session.   After Treatment   Patient Position After Treatment Seated in wheelchair  (in room)   Patient after treatment left call button in reach  (latex-free gloves donned)   Assessment   Prognosis Good   Problem List Decreased strength;Decreased endurance;Impaired balance;Decreased mobility;Decreased coordination;Decreased cognition;Decreased safety " awareness;Impaired vision   Session Assessment progressing toward goals;increased distance in gait   Assessment Pt participated in full treatment session w/o complaints.  Pt still requires constant cueing to slow down movements and to listen to instructions before getting out of w/c.  Pt has improved w/ sit <> stand transfer, but still requires cues for forward trunk lean throughout and knee flexion during stand > sit.  Pt's potential for progress is good, and is limited by impulsivity, distractibility, decreased endurance, decreased postural control, and ataxia.  Pt continues to slowly progress towards goal completion w/ inpatient rehab.   Level of Motivation/Participation Good   Barriers to Discharge None   Barriers to Discharge Comments  will be able to stay home w/ her.   Discharge Recommendations   Equipment Needed After Discharge bath bench;walker, rolling;wheelchair  (pt may have tub bench?????)   Discharge Facility/Level Of Care Needs home health PT   Plan   Planned Therapy Intervention Continue with current plan;Bed mobility training;Transfer training;Gait training;Balance Training;Strengthening;Neuromuscular Re-education;Motor Coordination Training;Postural Re-education;Wheelchair Management/Propulsion   Therapy Frequency 2 times/day;daily;Monday-Friday;Saturday or Sunday   Physical Therapy Follow-up   PT Follow-up? Yes   PT - Next Visit Date 10/23/17   Treatment/Billable Minutes   Gait training 15   Neuromuscular Re-education 22   Train/Wheelchair Management 8   Total Time 45         Dianna Frankel, DYLON  10/20/2017        I certify that I was present in the room directing the student in service delivery and guided him/her using my skilled judgment. As the co-signing therapist, I have reviewed the student's documentation and am responsible for the treatment, assessment and plan.        Ruddy Sanchez, PT

## 2017-10-20 NOTE — ASSESSMENT & PLAN NOTE
PT/OT/SLP      Transfers   Bed/Chair/WC 4   Locomotion   Distance Walked 3   Distance Wheelchair 3   Walk 4   Wheelchair 5   Mode C   Stairs 4

## 2017-10-20 NOTE — PLAN OF CARE
Problem: Patient Care Overview  Goal: Plan of Care Review  Outcome: Ongoing (interventions implemented as appropriate)  Plan of care reviewed: No signs of active stroke, vital signs within range. Instructed to call for assistance when getting out of bed and to use potty. Skin integrity maintained without skin breakdown, free of falls or injury.

## 2017-10-20 NOTE — SUBJECTIVE & OBJECTIVE
Interval History: Pt without new c/o's. States hands are getting better. I have reviewed the HPI and PMFSH and there are no changes from admission.       Scheduled Medications:    amitriptyline  50 mg Oral QHS    amlodipine  5 mg Oral Daily    atorvastatin  40 mg Oral Daily    benzocaine   Mouth/Throat BID    chlorhexidine  15 mL Mouth/Throat BID    dronabinol  5 mg Oral BID AC    erlotinib  150 mg Oral QHS    hydrocortisone   Topical BID    levetiracetam  750 mg Oral BID    lipase-protease-amylase 12,000-38,000-60,000 units  1 capsule Oral TID WM    magnesium oxide  400 mg Oral BID    metoprolol succinate  50 mg Oral Daily    miconazole   Topical (Top) BID    rivaroxaban  20 mg Oral Daily with dinner    senna-docusate 8.6-50 mg  1 tablet Oral BID       PRN Medications: bisacodyl, influenza, lorazepam, magnesium hydroxide 400 mg/5 ml, ondansetron, pneumoc 13-marcus conj-dip cr(PF), promethazine, ramelteon    Review of Systems   Constitutional: Negative for chills, fatigue and fever.   HENT: Negative for trouble swallowing and voice change.    Eyes: Negative for photophobia and visual disturbance.   Respiratory: Negative for cough, shortness of breath and wheezing.    Cardiovascular: Negative for chest pain and palpitations.   Gastrointestinal: Negative for abdominal distention and nausea.   Genitourinary: Negative for difficulty urinating and flank pain.   Musculoskeletal: Positive for gait problem. Negative for arthralgias.   Skin: Negative for color change and rash.   Neurological: Positive for speech difficulty and weakness. Negative for facial asymmetry, numbness and headaches.   Psychiatric/Behavioral: Negative for agitation and confusion.     Objective:     Vital Signs (Most Recent):  Temp: 98.2 °F (36.8 °C) (10/20/17 0630)  Pulse: 69 (10/20/17 0630)  Resp: 20 (10/20/17 0630)  BP: (!) 141/66 (10/20/17 0904)  SpO2: 95 % (10/20/17 0630)    Vital Signs (24h Range):  Temp:  [98.2 °F (36.8 °C)-98.5 °F  (36.9 °C)] 98.2 °F (36.8 °C)  Pulse:  [69-84] 69  Resp:  [18-20] 20  SpO2:  [95 %] 95 %  BP: (140-141)/(63-66) 141/66     Physical Exam   Constitutional: She appears well-developed and well-nourished.   HENT:   Head: Normocephalic and atraumatic.   Sores on superior palate   Eyes: EOM are normal. Pupils are equal, round, and reactive to light.   Neck: Normal range of motion.   Cardiovascular: Normal rate and regular rhythm.    Pulmonary/Chest: Effort normal. No respiratory distress.   Abdominal: Soft. There is no tenderness.   Musculoskeletal: Normal range of motion. She exhibits no deformity.   Neurological:   -  Mental Status:  AAOx3.  Follows commands.  Answers correct age and .  Recent and remote memory intact.  No neglect.    -  Speech and language:  - aphasia + dysarthria.    -  Coordination:  Finger to nose exam:  RUE mild dysmetria, LUE dysmetria.   -  Motor:  RUE: 4/5, 4/5 .  LUE: 5/5, 5/5 .  RLE: 5/5, DF 5/5, PF 5/5.  LLE: 5/5, DF 5/5, PF 5/5.   -  pronator drift. negative  -  Tone:  normal  -  Sensory:  Intact to light touch and pin prick.       Skin: Skin is warm and dry.   Two scabs noted with some redness over occiput area   Psychiatric: She has a normal mood and affect. Her behavior is normal.   Vitals reviewed.    NEUROLOGICAL EXAMINATION:     CRANIAL NERVES     CN III, IV, VI   Pupils are equal, round, and reactive to light.  Extraocular motions are normal.

## 2017-10-21 PROCEDURE — 25000003 PHARM REV CODE 250: Performed by: STUDENT IN AN ORGANIZED HEALTH CARE EDUCATION/TRAINING PROGRAM

## 2017-10-21 PROCEDURE — 63600175 PHARM REV CODE 636 W HCPCS: Performed by: PHYSICAL MEDICINE & REHABILITATION

## 2017-10-21 PROCEDURE — 12800000 HC REHAB SEMI-PRIVATE ROOM

## 2017-10-21 PROCEDURE — 99232 SBSQ HOSP IP/OBS MODERATE 35: CPT | Mod: ,,, | Performed by: PHYSICAL MEDICINE & REHABILITATION

## 2017-10-21 PROCEDURE — 25000003 PHARM REV CODE 250: Performed by: PHYSICAL MEDICINE & REHABILITATION

## 2017-10-21 RX ADMIN — AMITRIPTYLINE HYDROCHLORIDE 50 MG: 25 TABLET, FILM COATED ORAL at 08:10

## 2017-10-21 RX ADMIN — ATORVASTATIN CALCIUM 40 MG: 20 TABLET, FILM COATED ORAL at 08:10

## 2017-10-21 RX ADMIN — PANCRELIPASE 1 CAPSULE: 60000; 12000; 38000 CAPSULE, DELAYED RELEASE PELLETS ORAL at 12:10

## 2017-10-21 RX ADMIN — LORAZEPAM 1 MG: 1 TABLET ORAL at 08:10

## 2017-10-21 RX ADMIN — Medication 400 MG: at 08:10

## 2017-10-21 RX ADMIN — AMLODIPINE BESYLATE 5 MG: 5 TABLET ORAL at 08:10

## 2017-10-21 RX ADMIN — PANCRELIPASE 1 CAPSULE: 60000; 12000; 38000 CAPSULE, DELAYED RELEASE PELLETS ORAL at 04:10

## 2017-10-21 RX ADMIN — LEVETIRACETAM 750 MG: 750 TABLET ORAL at 08:10

## 2017-10-21 RX ADMIN — METOPROLOL SUCCINATE 50 MG: 50 TABLET, EXTENDED RELEASE ORAL at 08:10

## 2017-10-21 RX ADMIN — PANCRELIPASE 1 CAPSULE: 60000; 12000; 38000 CAPSULE, DELAYED RELEASE PELLETS ORAL at 08:10

## 2017-10-21 RX ADMIN — RIVAROXABAN 20 MG: 20 TABLET, FILM COATED ORAL at 04:10

## 2017-10-21 RX ADMIN — DRONABINOL 5 MG: 2.5 CAPSULE ORAL at 06:10

## 2017-10-21 RX ADMIN — ERLOTINIB HYDROCHLORIDE 150 MG: 150 TABLET, FILM COATED ORAL at 08:10

## 2017-10-21 RX ADMIN — DRONABINOL 5 MG: 2.5 CAPSULE ORAL at 04:10

## 2017-10-21 RX ADMIN — HYDROCORTISONE: 10 CREAM TOPICAL at 08:10

## 2017-10-21 RX ADMIN — MICONAZOLE NITRATE: 20 CREAM TOPICAL at 08:10

## 2017-10-21 NOTE — PLAN OF CARE
Problem: Patient Care Overview  Goal: Plan of Care Review  Outcome: Outcome(s) achieved Date Met: 10/20/17  Patient has denied any pain this shift. Patient is up ambulatory  with assist with no falls or injuries noted. Patient is stable with no distress noted. Plan of care explained to patient and verbal acknowledgement of understanding from patient. Patient is resting with call light in reach, bed in low,locked position with bed alarm set. Will continue to monitor.

## 2017-10-22 PROCEDURE — 25000003 PHARM REV CODE 250: Performed by: PHYSICAL MEDICINE & REHABILITATION

## 2017-10-22 PROCEDURE — 25000003 PHARM REV CODE 250: Performed by: STUDENT IN AN ORGANIZED HEALTH CARE EDUCATION/TRAINING PROGRAM

## 2017-10-22 PROCEDURE — 63600175 PHARM REV CODE 636 W HCPCS: Performed by: PHYSICAL MEDICINE & REHABILITATION

## 2017-10-22 PROCEDURE — 99233 SBSQ HOSP IP/OBS HIGH 50: CPT | Mod: ,,, | Performed by: PHYSICAL MEDICINE & REHABILITATION

## 2017-10-22 PROCEDURE — 12800000 HC REHAB SEMI-PRIVATE ROOM

## 2017-10-22 RX ADMIN — LEVETIRACETAM 750 MG: 750 TABLET ORAL at 09:10

## 2017-10-22 RX ADMIN — Medication 400 MG: at 09:10

## 2017-10-22 RX ADMIN — PANCRELIPASE 1 CAPSULE: 60000; 12000; 38000 CAPSULE, DELAYED RELEASE PELLETS ORAL at 12:10

## 2017-10-22 RX ADMIN — ERLOTINIB HYDROCHLORIDE 150 MG: 150 TABLET, FILM COATED ORAL at 08:10

## 2017-10-22 RX ADMIN — AMITRIPTYLINE HYDROCHLORIDE 50 MG: 25 TABLET, FILM COATED ORAL at 09:10

## 2017-10-22 RX ADMIN — CHLORHEXIDINE GLUCONATE 15 ML: 1.2 RINSE ORAL at 09:10

## 2017-10-22 RX ADMIN — ATORVASTATIN CALCIUM 40 MG: 20 TABLET, FILM COATED ORAL at 09:10

## 2017-10-22 RX ADMIN — DRONABINOL 5 MG: 2.5 CAPSULE ORAL at 05:10

## 2017-10-22 RX ADMIN — METOPROLOL SUCCINATE 50 MG: 50 TABLET, EXTENDED RELEASE ORAL at 09:10

## 2017-10-22 RX ADMIN — LORAZEPAM 1 MG: 1 TABLET ORAL at 09:10

## 2017-10-22 RX ADMIN — RIVAROXABAN 20 MG: 20 TABLET, FILM COATED ORAL at 05:10

## 2017-10-22 RX ADMIN — AMLODIPINE BESYLATE 5 MG: 5 TABLET ORAL at 09:10

## 2017-10-22 RX ADMIN — DRONABINOL 5 MG: 2.5 CAPSULE ORAL at 07:10

## 2017-10-22 RX ADMIN — STANDARDIZED SENNA CONCENTRATE AND DOCUSATE SODIUM 1 TABLET: 8.6; 5 TABLET, FILM COATED ORAL at 09:10

## 2017-10-22 NOTE — PROGRESS NOTES
[facility]  Physical Medicine & Rehab  Progress Note    Patient Name: Naty St  MRN: [id number]  Patient Class: IP- Rehab   Admission Date: 10/11/2017  Length of Stay: 10 days  Attending Physician: Yordy Ponce MD  Primary Care Provider: Olvin Mars MD    Subjective:     Principal Problem: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       Interval History: Pt without new c/o's.       I have reviewed the HPI and PMFSH and there are no changes from admission.        Review of Systems   Constitutional: Negative for chills, fatigue and fever.   HENT: Negative for trouble swallowing and voice change.    Eyes: Negative for photophobia and visual disturbance.   Respiratory: Negative for cough, shortness of breath and wheezing.    Cardiovascular: Negative for chest pain and palpitations.   Gastrointestinal: Negative for abdominal distention and nausea.   Genitourinary: Negative for difficulty urinating and flank pain.   Musculoskeletal: Positive for gait problem. Negative for arthralgias.   Skin: Negative for color change and rash.   Neurological: Positive for speech difficulty and weakness. Negative for facial asymmetry, numbness and headaches.   Psychiatric/Behavioral: Negative for agitation and confusion.  Objective:      Vitals:    10/21/17 1927   BP: (!) 141/75   Pulse: 88   Resp: 18   Temp: 98.5 °F (36.9 °C)     Physical Exam   Constitutional: She appears well-developed and well-nourished.   HENT:   Head: Normocephalic and atraumatic.   Eyes: EOM are normal. Pupils are equal, round, and reactive to light.   Neck: Normal range of motion.   Cardiovascular: Normal rate and regular rhythm.    Pulmonary/Chest: Effort normal. No respiratory distress.   Abdominal: Soft. There is no tenderness.   Musculoskeletal: Normal range of motion. She exhibits no deformity.   Neurological:   -  Mental Status:  AAOx3.  Follows commands.  Answers correct age and .  Recent and  remote memory intact.  No neglect.    -  Speech and language:  - aphasia + dysarthria.    -  Coordination:  Finger to nose exam:  RUE mild dysmetria, LUE dysmetria.   -  Motor:  RUE: 4/5, 4/5 .  LUE: 5/5, 5/5 .  RLE: 5/5, DF 5/5, PF 5/5.  LLE: 5/5, DF 5/5, PF 5/5.   -  pronator drift. negative  -  Tone:  normal  -  Sensory:  Intact to light touch and pin prick.       Skin: Skin is warm and dry.   Psychiatric: She has a normal mood and affect. Her behavior is normal.   Vitals reviewed.     NEUROLOGICAL EXAMINATION:      CRANIAL NERVES      CN III, IV, VI   Pupils are equal, round, and reactive to light.  Extraocular motions are normal.       Assessment/Plan:      Naty St is a 72 y.o. female admitted to inpatient rehabilitation on 10/11/2017 for 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 with impaired mobility and ADLs. Patient remains appropriate for PT, OT, and as required Speech therapy. Patient continues to require 24 hour nursing care as well as daily Physician assessment.    * 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    PT/OT/SLP      Transfers   Bed/Chair/WC 4   Locomotion   Distance Walked 3   Distance Wheelchair 3   Walk 4   Wheelchair 5   Mode C   Stairs 4             Folliculitis    Miconazole ordered BID        Ataxia    Likely 2/2 stroke. Continue working with PT/OT        CKD (chronic kidney disease) stage 3, GFR 30-59 ml/min    Uptrending. Will continue to monitor. Encouraged PO fluids.        Hypomagnesemia    1.7 today. Continue Mag Oxide 400mg BID. Continue to monitor        Essential hypertension    Controlled. Continue meds.              Mckenna Zayas MD  Department of Physical Medicine & Rehab   [facility]

## 2017-10-22 NOTE — PROGRESS NOTES
[facility]  Physical Medicine & Rehab  Progress Note    Patient Name: Naty St  MRN: [id number]  Patient Class: IP- Rehab   Admission Date: 10/11/2017  Length of Stay: 11 days  Attending Physician: Yordy Ponce MD  Primary Care Provider: Olvin Mars MD    Subjective:     Principal Problem: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       Interval History: Pt c/o of scalp skin changes.  Her daughter states that she was told that can be associated with Tarceva, that she is taking.   Dr. Daniels is pt's Oncologist, who was giving her prescription for   Current therapy (p is out of medication), and she treated that   Head skin changes with doxycycline successfully in past.   She is asking if she can get that ABX        I have reviewed the HPI and PMFSH and there are no changes from admission.        Review of Systems   Constitutional: Negative for chills, fatigue and fever.   HENT: Negative for trouble swallowing and voice change.    Eyes: Negative for photophobia and visual disturbance.   Respiratory: Negative for cough, shortness of breath and wheezing.    Cardiovascular: Negative for chest pain and palpitations.   Gastrointestinal: Negative for abdominal distention and nausea.   Genitourinary: Negative for difficulty urinating and flank pain.   Musculoskeletal: Positive for gait problem. Negative for arthralgias.   Skin: Negative for color change and rash.   Neurological: Positive for speech difficulty and weakness. Negative for facial asymmetry, numbness and headaches.   Psychiatric/Behavioral: Negative for agitation and confusion.  Objective:      Vitals:    10/22/17 0726   BP: (!) 116/56   Pulse: 67   Resp: 18   Temp: 98.3 °F (36.8 °C)     Physical Exam   Constitutional: She appears well-developed and well-nourished.   HENT:   Head: Normocephalic and atraumatic.   Eyes: EOM are normal. Pupils are equal, round, and reactive to light.   Neck: Normal range of  motion.   Cardiovascular: Normal rate and regular rhythm.    Pulmonary/Chest: Effort normal. No respiratory distress.   Abdominal: Soft. There is no tenderness.   Musculoskeletal: Normal range of motion. She exhibits no deformity.   Neurological:   -  Mental Status:  AAOx3.  Follows commands.  Answers correct age and .  Recent and remote memory intact.  No neglect.    -  Speech and language:  - aphasia + dysarthria.    -  Coordination:  Finger to nose exam:  RUE mild dysmetria, LUE dysmetria.   -  Motor:  RUE: 4/5, 4/5 .  LUE: 5/5, 5/5 .  RLE: 5/5, DF 5/5, PF 5/5.  LLE: 5/5, DF 5/5, PF 5/5.   -  pronator drift. negative  -  Tone:  normal  -  Sensory:  Intact to light touch and pin prick.       Skin: Skin is warm and dry.   Psychiatric: She has a normal mood and affect. Her behavior is normal.   Vitals reviewed.     NEUROLOGICAL EXAMINATION:      CRANIAL NERVES      CN III, IV, VI   Pupils are equal, round, and reactive to light.  Extraocular motions are normal.       Assessment/Plan:      Naty St is a 72 y.o. female admitted to inpatient rehabilitation on 10/11/2017 for 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 with impaired mobility and ADLs.   Patient remains appropriate for PT, OT, and as required Speech therapy. Patient continues to require 24 hour nursing care as well as daily Physician assessment.    * 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    PT/OT/SLP      Transfers   Bed/Chair/WC 4   Locomotion   Distance Walked 3   Distance Wheelchair 3   Walk 4   Wheelchair 5   Mode C   Stairs 4             Folliculitis    Miconazole ordered BID        Ataxia    Likely 2/2 stroke. Continue working with PT/OT        CKD (chronic kidney disease) stage 3, GFR 30-59 ml/min    Uptrending. Will continue to monitor. Encouraged PO fluids.        Hypomagnesemia    1.7 today. Continue Mag Oxide 400mg BID. Continue to monitor        Essential hypertension     Controlled. Continue meds.          Discussed a current problem with patient and her daughter, and ask patient to contact her Oncologist, and try to get her suggestions and recommendation, and we'll change mediation   Also asked daugheter to get a new prescription of Tarceva from a  Pharmacy at  [facility].        Mckenna Zayas MD  Department of Physical Medicine & Rehab   [facility]

## 2017-10-23 ENCOUNTER — TELEPHONE (OUTPATIENT)
Dept: HEMATOLOGY/ONCOLOGY | Facility: CLINIC | Age: 72
End: 2017-10-23

## 2017-10-23 ENCOUNTER — TELEPHONE (OUTPATIENT)
Dept: NEUROSURGERY | Facility: CLINIC | Age: 72
End: 2017-10-23

## 2017-10-23 DIAGNOSIS — C34.31 MALIGNANT NEOPLASM OF LOWER LOBE OF RIGHT LUNG: Primary | ICD-10-CM

## 2017-10-23 LAB
ANION GAP SERPL CALC-SCNC: 5 MMOL/L
BASOPHILS # BLD AUTO: 0.01 K/UL
BASOPHILS NFR BLD: 0.1 %
BUN SERPL-MCNC: 21 MG/DL
CALCIUM SERPL-MCNC: 8.2 MG/DL
CHLORIDE SERPL-SCNC: 115 MMOL/L
CO2 SERPL-SCNC: 22 MMOL/L
CREAT SERPL-MCNC: 1.5 MG/DL
DIFFERENTIAL METHOD: ABNORMAL
EOSINOPHIL # BLD AUTO: 0.3 K/UL
EOSINOPHIL NFR BLD: 3.8 %
ERYTHROCYTE [DISTWIDTH] IN BLOOD BY AUTOMATED COUNT: 13.4 %
EST. GFR  (AFRICAN AMERICAN): 39.6 ML/MIN/1.73 M^2
EST. GFR  (NON AFRICAN AMERICAN): 34.3 ML/MIN/1.73 M^2
GLUCOSE SERPL-MCNC: 89 MG/DL
HCT VFR BLD AUTO: 31.3 %
HGB BLD-MCNC: 9.4 G/DL
LYMPHOCYTES # BLD AUTO: 1.5 K/UL
LYMPHOCYTES NFR BLD: 21.8 %
MAGNESIUM SERPL-MCNC: 1.4 MG/DL
MCH RBC QN AUTO: 28.6 PG
MCHC RBC AUTO-ENTMCNC: 30 G/DL
MCV RBC AUTO: 95 FL
MONOCYTES # BLD AUTO: 0.8 K/UL
MONOCYTES NFR BLD: 11.9 %
NEUTROPHILS # BLD AUTO: 4.3 K/UL
NEUTROPHILS NFR BLD: 62.4 %
PLATELET # BLD AUTO: 301 K/UL
PMV BLD AUTO: 9.9 FL
POTASSIUM SERPL-SCNC: 4.4 MMOL/L
RBC # BLD AUTO: 3.29 M/UL
SODIUM SERPL-SCNC: 142 MMOL/L
WBC # BLD AUTO: 6.83 K/UL

## 2017-10-23 PROCEDURE — 97116 GAIT TRAINING THERAPY: CPT

## 2017-10-23 PROCEDURE — 12800000 HC REHAB SEMI-PRIVATE ROOM

## 2017-10-23 PROCEDURE — 36415 COLL VENOUS BLD VENIPUNCTURE: CPT

## 2017-10-23 PROCEDURE — 83735 ASSAY OF MAGNESIUM: CPT

## 2017-10-23 PROCEDURE — 97112 NEUROMUSCULAR REEDUCATION: CPT

## 2017-10-23 PROCEDURE — 99232 SBSQ HOSP IP/OBS MODERATE 35: CPT | Mod: ,,, | Performed by: PHYSICAL MEDICINE & REHABILITATION

## 2017-10-23 PROCEDURE — 25000003 PHARM REV CODE 250: Performed by: STUDENT IN AN ORGANIZED HEALTH CARE EDUCATION/TRAINING PROGRAM

## 2017-10-23 PROCEDURE — 80048 BASIC METABOLIC PNL TOTAL CA: CPT

## 2017-10-23 PROCEDURE — 97530 THERAPEUTIC ACTIVITIES: CPT

## 2017-10-23 PROCEDURE — 25000003 PHARM REV CODE 250: Performed by: PHYSICAL MEDICINE & REHABILITATION

## 2017-10-23 PROCEDURE — 63600175 PHARM REV CODE 636 W HCPCS: Performed by: PHYSICAL MEDICINE & REHABILITATION

## 2017-10-23 PROCEDURE — 92508 TX SP LANG VOICE COMM GROUP: CPT

## 2017-10-23 PROCEDURE — 85025 COMPLETE CBC W/AUTO DIFF WBC: CPT

## 2017-10-23 RX ORDER — LANOLIN ALCOHOL/MO/W.PET/CERES
400 CREAM (GRAM) TOPICAL 2 TIMES DAILY
Status: DISCONTINUED | OUTPATIENT
Start: 2017-10-23 | End: 2017-10-24

## 2017-10-23 RX ORDER — DOXYCYCLINE HYCLATE 100 MG
100 TABLET ORAL EVERY 12 HOURS
Status: DISCONTINUED | OUTPATIENT
Start: 2017-10-23 | End: 2017-10-26 | Stop reason: HOSPADM

## 2017-10-23 RX ORDER — MAGNESIUM SULFATE HEPTAHYDRATE 40 MG/ML
2 INJECTION, SOLUTION INTRAVENOUS ONCE
Status: DISCONTINUED | OUTPATIENT
Start: 2017-10-23 | End: 2017-10-24

## 2017-10-23 RX ORDER — ERLOTINIB HYDROCHLORIDE 150 MG/1
150 TABLET, FILM COATED ORAL DAILY
Qty: 60 TABLET | Refills: 12 | Status: ON HOLD | OUTPATIENT
Start: 2017-10-23 | End: 2018-03-14 | Stop reason: HOSPADM

## 2017-10-23 RX ADMIN — CHLORHEXIDINE GLUCONATE 15 ML: 1.2 RINSE ORAL at 08:10

## 2017-10-23 RX ADMIN — Medication 400 MG: at 08:10

## 2017-10-23 RX ADMIN — AMLODIPINE BESYLATE 5 MG: 5 TABLET ORAL at 07:10

## 2017-10-23 RX ADMIN — DOXYCYCLINE HYCLATE 100 MG: 100 TABLET, COATED ORAL at 05:10

## 2017-10-23 RX ADMIN — PANCRELIPASE 1 CAPSULE: 60000; 12000; 38000 CAPSULE, DELAYED RELEASE PELLETS ORAL at 05:10

## 2017-10-23 RX ADMIN — AMITRIPTYLINE HYDROCHLORIDE 50 MG: 25 TABLET, FILM COATED ORAL at 08:10

## 2017-10-23 RX ADMIN — ATORVASTATIN CALCIUM 40 MG: 20 TABLET, FILM COATED ORAL at 07:10

## 2017-10-23 RX ADMIN — DRONABINOL 5 MG: 2.5 CAPSULE ORAL at 07:10

## 2017-10-23 RX ADMIN — RIVAROXABAN 20 MG: 20 TABLET, FILM COATED ORAL at 05:10

## 2017-10-23 RX ADMIN — METOPROLOL SUCCINATE 50 MG: 50 TABLET, EXTENDED RELEASE ORAL at 07:10

## 2017-10-23 RX ADMIN — PANCRELIPASE 1 CAPSULE: 60000; 12000; 38000 CAPSULE, DELAYED RELEASE PELLETS ORAL at 07:10

## 2017-10-23 RX ADMIN — LEVETIRACETAM 750 MG: 750 TABLET ORAL at 07:10

## 2017-10-23 RX ADMIN — Medication 400 MG: at 07:10

## 2017-10-23 RX ADMIN — LORAZEPAM 1 MG: 1 TABLET ORAL at 08:10

## 2017-10-23 RX ADMIN — ERLOTINIB HYDROCHLORIDE 150 MG: 150 TABLET, FILM COATED ORAL at 08:10

## 2017-10-23 RX ADMIN — DRONABINOL 5 MG: 2.5 CAPSULE ORAL at 03:10

## 2017-10-23 RX ADMIN — LEVETIRACETAM 750 MG: 750 TABLET ORAL at 08:10

## 2017-10-23 RX ADMIN — PANCRELIPASE 1 CAPSULE: 60000; 12000; 38000 CAPSULE, DELAYED RELEASE PELLETS ORAL at 12:10

## 2017-10-23 NOTE — PROGRESS NOTES
Physical Therapy   FIM Scores    Naty St   MRN: [id number]              10/23/17 1300   Transfers   Bed/Chair/WC 4   Toilet 4   Locomotion   Distance Walked 3   Distance Wheelchair 3   Walk 4   Wheelchair 5   Mode C   Stairs 4           Dianna Frankel, DYLON  10/23/2017        I certify that I was present in the room directing the student in service delivery and guided him/her using my skilled judgment. As the co-signing therapist, I have reviewed the student's documentation and am responsible for the treatment, assessment and plan.        Ruddy Sanchez, PT

## 2017-10-23 NOTE — TELEPHONE ENCOUNTER
Please see below.  May the patient receive doxycycline for her tarceva rash while under your care?  ~wen

## 2017-10-23 NOTE — PROGRESS NOTES
[facility]  Physical Medicine & Rehab  Progress Note    Patient Name: Naty St  MRN: [id number]  Patient Class: IP- Rehab   Admission Date: 10/11/2017  Length of Stay: 12 days  Attending Physician: Yordy Ponce MD  Primary Care Provider: Olvin Mars MD    Subjective:     Principal Problem: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    Interval History: Pt without new c/o's. Had fall this morning. Patient reports sliding to ground onto her buttocks. Denies pain. I have reviewed the HPI and PMFSH and there are no changes from admission.       Scheduled Medications:    amitriptyline  50 mg Oral QHS    amlodipine  5 mg Oral Daily    atorvastatin  40 mg Oral Daily    benzocaine   Mouth/Throat BID    chlorhexidine  15 mL Mouth/Throat BID    dronabinol  5 mg Oral BID AC    erlotinib  150 mg Oral QHS    hydrocortisone   Topical BID    levetiracetam  750 mg Oral BID    lipase-protease-amylase 12,000-38,000-60,000 units  1 capsule Oral TID WM    magnesium oxide  400 mg Oral BID    metoprolol succinate  50 mg Oral Daily    rivaroxaban  20 mg Oral Daily with dinner    senna-docusate 8.6-50 mg  1 tablet Oral BID       PRN Medications: bisacodyl, influenza, lorazepam, magnesium hydroxide 400 mg/5 ml, ondansetron, pneumoc 13-marcus conj-dip cr(PF), promethazine, ramelteon    Review of Systems   Constitutional: Negative for chills, fatigue and fever.   HENT: Negative for trouble swallowing and voice change.    Eyes: Negative for photophobia and visual disturbance.   Respiratory: Negative for cough, shortness of breath and wheezing.    Cardiovascular: Negative for chest pain and palpitations.   Gastrointestinal: Negative for abdominal distention and nausea.   Genitourinary: Negative for difficulty urinating and flank pain.   Musculoskeletal: Positive for gait problem. Negative for arthralgias.   Skin: Negative for color change and rash.   Neurological: Positive  for speech difficulty and weakness. Negative for facial asymmetry, numbness and headaches.   Psychiatric/Behavioral: Negative for agitation and confusion.     Objective:     Vital Signs (Most Recent):  Temp: 97.4 °F (36.3 °C) (10/23/17 0710)  Pulse: 77 (10/23/17 0845)  Resp: 16 (10/23/17 0845)  BP: 113/63 (10/23/17 0845)  SpO2: 97 % (10/23/17 0845)    Vital Signs (24h Range):  Temp:  [97.4 °F (36.3 °C)-98.4 °F (36.9 °C)] 97.4 °F (36.3 °C)  Pulse:  [69-77] 77  Resp:  [16-18] 16  SpO2:  [95 %-97 %] 97 %  BP: (113-147)/(59-77) 113/63     Physical Exam   Constitutional: She appears well-developed and well-nourished.   HENT:   Head: Normocephalic and atraumatic.   Sores on superior palate   Eyes: EOM are normal. Pupils are equal, round, and reactive to light.   Neck: Normal range of motion.   Cardiovascular: Normal rate and regular rhythm.    Pulmonary/Chest: Effort normal. No respiratory distress.   Abdominal: Soft. There is no tenderness.   Musculoskeletal: Normal range of motion. She exhibits no deformity.   Neurological:   -  Mental Status:  AAOx3.  Follows commands.  Answers correct age and .  Recent and remote memory intact.  No neglect.    -  Speech and language:  - aphasia + dysarthria.    -  Coordination:  Finger to nose exam:  RUE mild dysmetria, LUE dysmetria.   -  Motor:  RUE: 4/5, 4/5 .  LUE: 5/5, 5/5 .  RLE: 5/5, DF 5/5, PF 5/5.  LLE: 5/5, DF 5/5, PF 5/5.   -  pronator drift. negative  -  Tone:  normal  -  Sensory:  Intact to light touch and pin prick.       Skin: Skin is warm and dry.   Two scabs noted with some redness over occiput area   Psychiatric: She has a normal mood and affect. Her behavior is normal.   Vitals reviewed.    NEUROLOGICAL EXAMINATION:     CRANIAL NERVES     CN III, IV, VI   Pupils are equal, round, and reactive to light.  Extraocular motions are normal.       Assessment/Plan:      Naty St is a 72 y.o. female admitted to inpatient rehabilitation on 10/11/2017  for 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 with impaired mobility and ADLs. Patient remains appropriate for PT, OT, and as required Speech therapy. Patient continues to require 24 hour nursing care as well as daily Physician assessment.    * 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    PT/OT/SLP      Transfers   Bed/Chair/WC 4   Locomotion   Distance Walked 3   Distance Wheelchair 3   Walk 4   Wheelchair 5   Mode C   Stairs 4             Folliculitis    Miconazole ordered BID        Ataxia    Likely 2/2 stroke. Continue working with PT/OT        CKD (chronic kidney disease) stage 3, GFR 30-59 ml/min    Uptrending. Will continue to monitor. Encouraged PO fluids.        Hypomagnesemia    1.7 today. Continue Mag Oxide 400mg BID. Continue to monitor        Essential hypertension    Controlled. Continue meds.            DISCHARGE PLANNING:  Tentative Discharge Date: 10/26/2017    Future Appointments  Date Time Provider Department Center   11/9/2017 9:30 AM [facility] PET CT LIMIT 500 LBS [facility] CT [facility]   11/9/2017 2:00 PM Pablo Lr MD [facility] STROKE Reece Milan   11/10/2017 9:30 AM LAB, HEMONC CANCER BLDG [facility] LAB HO [facility]   11/10/2017 10:30 AM Karrie Vazquez MD [facility] HEM ONC [facility]   11/10/2017 11:30 AM INJECTION, [facility] INFUSION [facility] CHEMO Daniel Dillard   12/5/2017 1:30 PM [facility] MRI2 [facility] MRI Reece Ponce MD  Department of Physical Medicine & Rehab   [facility]

## 2017-10-23 NOTE — PROGRESS NOTES
"0840 Fall: States, "I called for assistance but couldn't wait". Ms. Alves found her the floor. Stated she slide down on to the floor, to her buttocks and stated she didn't hit her head and denies pain. No break in skin. 30 minute rounds post fall initiated. Instructed not to get up without assistance.  0845 Vital Sign: 113/63. 97, 16. Assisted to bathroom  0915 30 minute round post fall performed. Appears to be stable, no complaints at this time.   0936 Dr. Ponce at patient bedside. 30 minute round post fall.  1009 Sitting up in recliner chair and without complaints.  1052 In therapy.  "

## 2017-10-23 NOTE — TELEPHONE ENCOUNTER
"Returned call to patient's son, who is calling to report [facility] (was admitted for stroke rehab)    Son states patient needs a refill of tarceva to go to [facility], as well as, dr sullivan to speak with dr kushal ponce at rehab about her doxycycline (apparently she is not receiving it--and her rash is worse).   Son states, "Dr. Ponce was to contact Dr. Sullivan. Did he?"    Nurse informed patient's son she would contact him back after speaking with dr sullivan.  Son thanked nurse.      Message routed to dr sullivan   "

## 2017-10-23 NOTE — SUBJECTIVE & OBJECTIVE
Interval History: Pt without new c/o's. Had fall this morning. Patient reports sliding to ground onto her buttocks. Denies pain. [facility] and [facility] are no changes from [facility].       Scheduled Medications:    amitriptyline  50 mg Oral QHS    amlodipine  5 mg Oral Daily    atorvastatin  40 mg Oral Daily    benzocaine   Mouth/Throat BID    chlorhexidine  15 mL Mouth/Throat BID    dronabinol  5 mg Oral BID AC    erlotinib  150 mg Oral QHS    hydrocortisone   Topical BID    levetiracetam  750 mg Oral BID    lipase-protease-amylase 12,000-38,000-60,000 units  1 capsule Oral TID WM    magnesium oxide  400 mg Oral BID    metoprolol succinate  50 mg Oral Daily    rivaroxaban  20 mg Oral Daily with dinner    senna-docusate 8.6-50 mg  1 tablet Oral BID       PRN Medications: bisacodyl, influenza, lorazepam, magnesium hydroxide 400 mg/5 ml, ondansetron, pneumoc 13-marcus conj-dip cr(PF), promethazine, ramelteon    Review of Systems   Constitutional: Negative for chills, fatigue and fever.   HENT: Negative for trouble swallowing and voice change.    Eyes: Negative for photophobia and visual disturbance.   Respiratory: Negative for cough, shortness of breath and wheezing.    Cardiovascular: Negative for chest pain and palpitations.   Gastrointestinal: Negative for abdominal distention and nausea.   Genitourinary: Negative for difficulty urinating and flank pain.   Musculoskeletal: Positive for gait problem. Negative for arthralgias.   Skin: Negative for color change and rash.   Neurological: Positive for speech difficulty and weakness. Negative for facial asymmetry, numbness and headaches.   Psychiatric/Behavioral: Negative for agitation and confusion.     Objective:     Vital Signs (Most Recent):  Temp: 97.4 °F (36.3 °C) (10/23/17 0710)  Pulse: 77 (10/23/17 0845)  Resp: 16 (10/23/17 0845)  BP: 113/63 (10/23/17 0845)  SpO2: 97 % (10/23/17 0845)    Vital Signs (24h Range):  Temp:  [97.4 °F  (36.3 °C)-98.4 °F (36.9 °C)] 97.4 °F (36.3 °C)  Pulse:  [69-77] 77  Resp:  [16-18] 16  SpO2:  [95 %-97 %] 97 %  BP: (113-147)/(59-77) 113/63     Physical Exam   Constitutional: She appears well-developed and well-nourished.   HENT:   Head: Normocephalic and atraumatic.   Sores on superior palate   Eyes: EOM are normal. Pupils are equal, round, and reactive to light.   Neck: Normal range of motion.   Cardiovascular: Normal rate and regular rhythm.    Pulmonary/Chest: Effort normal. No respiratory distress.   Abdominal: Soft. There is no tenderness.   Musculoskeletal: Normal range of motion. She exhibits no deformity.   Neurological:   -  Mental Status:  AAOx3.  Follows commands.  Answers correct age and .  Recent and remote memory intact.  No neglect.    -  Speech and language:  - aphasia + dysarthria.    -  Coordination:  Finger to nose exam:  RUE mild dysmetria, LUE dysmetria.   -  Motor:  RUE: 4/5, 4/5 .  LUE: 5/5, 5/5 .  RLE: 5/5, DF 5/5, PF 5/5.  LLE: 5/5, DF 5/5, PF 5/5.   -  pronator drift. negative  -  Tone:  normal  -  Sensory:  Intact to light touch and pin prick.       Skin: Skin is warm and dry.   Two scabs noted with some redness over occiput area   Psychiatric: She has a normal mood and affect. Her behavior is normal.   Vitals reviewed.    NEUROLOGICAL EXAMINATION:     CRANIAL NERVES     CN III, IV, VI   Pupils are equal, round, and reactive to light.  Extraocular motions are normal.

## 2017-10-23 NOTE — PROGRESS NOTES
1607 Bill attempted several times to start line for IVPB, Dr. Ponce paged to see if medication can be given po, no answers at this time.

## 2017-10-23 NOTE — TELEPHONE ENCOUNTER
JESSENIA FOURNIER, WILL HAVE DR ESTRADA REVIEW THE SCANS AND COMMENT. I WILL GET BACK TO HIM IN THE AM, AFTER DR ESTRADA HAS A MOMENT TO REVIEW THEM.

## 2017-10-23 NOTE — TELEPHONE ENCOUNTER
Sent Tarceva.  Can you see if you can get the nurse in rehab and see if they can do doxycycline.

## 2017-10-23 NOTE — PROGRESS NOTES
Occupational Therapy   FIM Scores    Naty St   MRN: [id number]      10/23/17 1015   Transfers   Bed/Chair/WC 4     LEOBARDO Bustillos   10/23/2017

## 2017-10-23 NOTE — PROGRESS NOTES
Occupational Therapy   AM Treatment    Naty St   MRN: [id number]      10/23/17 1015   OT Time Calculation   OT Start Time 1015   OT Stop Time 1100   OT Total Time (min) 45 min   General   OT Date of Treatment 10/23/17   Patient Found (position) Seated in bedside chair   Precautions   General Precautions fall   Visual/Auditory Vision impaired  (glasses)   Pain/Comfort   Pain Rating no pain   Sit to Stand   Sit <> Stand Assistance Contact Guard Assistance   Sit <> Stand Assistive Device Rolling Walker   Trials/Comments cues for hand position   Stand to Sit   Assistance Contact Guard Assistance   Assistive Device Rolling Walker   Trials/Comments cues to reach back prior to sitting   Additional Activities   Additional Activities Comments RUE coordination tasks:    - pre-writing, tracing worksheets with R hand only to promote increased coordination in R UE   - Pt clipped and unclipped clothespins from tree with R hand. Pt required cues to use R hand as she occasionally uses L hand instead of R.   - Bilateral laura x 4# x 20 reps x 5 sets on incline board for B coordination/strengthening   Activity Tolerance   Activity Tolerance Patient tolerated treatment well   Medical Staff Made Aware NSG   After Treatment   Patient Position After Treatment Seated in wheelchair   Patient after treatment left call button in reach   Assessment   Prognosis Good   Problem List Decreased Self Care skills;Decreased upper extremity strength;Decreased safe judgment during ADL;Decreased cognition;Decreased endurance;Decreased sensation;Decreased fine motor control;Decreased functional mobility;Decreased gross motor control;Decreased IADLs;Decreased Function of right upper extremity;Decreased trunk control for functional activities   Assessment Pt progressing with control of RUE for gross motor movements but still remains challenged with FM control, especially as she fatigues. Pt remains appropriate for IP rehab and would continue  to benefit from OT to address safety awareness during self care and transfers.    Level of Motivation/Participation good   Discharge Recommendations   Equipment Needed After Discharge bath bench;walker, rolling;wheelchair   Discharge Facility/Level Of Care Needs home health OT   Plan   Plan Continue with current plan   Therapy Frequency 2 times/day;Monday-Friday;Saturday or Sunday   Occupational Therapy Follow-up   OT Follow-up? Yes   Treatment/Billable Minutes   Neuromuscular Re-ed 45   Total Time 45     LEOBARDO Bustillos   10/23/2017

## 2017-10-23 NOTE — TELEPHONE ENCOUNTER
----- Message from Kim Holt sent at 10/23/2017  8:30 AM CDT -----  Contact: Pt son Basil  Pt son calling stating that he has questions regarding pt treatment plan. He also wants to know if her cancer medication can be sent to the rehabilitation center that the pt is currently in.    Basil call back number [phone]

## 2017-10-23 NOTE — PLAN OF CARE
Problem: Patient Care Overview  Goal: Plan of Care Review  Outcome: Ongoing (interventions implemented as appropriate)  Plan of care reviewed: Tolerated fluids and food without difficulty, Minimal  assistance needed to brush teeth and put on clothes. Skin integrity maintained. Had a fall today after instructed not to get up without assistance, post fall rounds performed and Dr. Ponce was notified.

## 2017-10-23 NOTE — PROGRESS NOTES
Speech Therapy   Cognitive Group Therapy    Naty St   MRN: [id number]        10/23/17 1115   Speech Time Calculation   Speech Start Time 1115   Speech Stop Time 1200   Speech Total (min) 45 min   General Information   SLP Treatment Date 10/23/17   General Observations Patient seen for cognitive group therapy session.   General Precautions fall   Visual/Auditory Vision impaired  (glasses)   Pain/Comfort   Pain Rating no pain       SLP Cognitive Treatment   Treatment Detail (SLP Cognitive Treatment)  Patient participated in a 45 minute functional cognitive task. The group activity focused on attention, safety awareness, recall, and sequencing skills.  Additionally group discussion (when participating) allows for functional carry-over and self-monitoring of memory strategies/skills, reasoning, attention and sequencing.  The patient was able to provide solutions to home safety/medication management situations with 70% accuracy given min assistance.  Patient was able to attend to tasks within a group setting for 45 minutes independently.   ST provided patient with recall handout and engaged patient in compensatory recall strategy education to improve functional independence upon d/c. Patient verbalized understanding.    Goal: Pt will complete sequencing/problem solving tasks of home/hospital environment given min verbal/visual cues with 75% acc targeting increased independence.    Assessment:  These activities were performed in a group setting to encourage increased participation with peers, target processing skills, and utilization of compensatory cognitive strategies. Pt also benefits from social interaction with peers experiencing similar obstacles within rehabilitation process.   Treatment/Billable Minutes   Speech Therapy Individual 45   Total Time 45       FIM Scores  Comprehension:4  Expression: 3  Social Interaction:4  Problem Solving:3  Memory: 2     Comprehension:  Complex= humor, finances,  rationale for medical treatment(hip precautions, pressure relief)  Basic= pain, hunger, thirst, bathroom needs, cold, nutrition, sleep     Understands basic 75-89%- may need words repeated (4)     Expression:  Expresses basic 50-74% of time - Needs to repeat parts of sentences  (3)     Social Interaction:  Interacts appropriately 75-89% of time - needs redirection for appropriate language or to initiate interaction  (4)     Problem Solving:  Solves basic problems 50-74% of the time  (3)     Memory:  Recognizes, recalls, or executes 25-49% of time 1 step of 2 step request  (2)       LEONEL Marcus-SLP  10/23/2017

## 2017-10-23 NOTE — TELEPHONE ENCOUNTER
----- Message from Jd Goode sent at 10/23/2017  1:39 PM CDT -----  Contact: Basil ( son ) [phone]  Caller is returning a missed called, pls call

## 2017-10-23 NOTE — TELEPHONE ENCOUNTER
----- Message from Jd Goode sent at 10/23/2017 11:39 AM CDT -----  Contact: Basil ( son ) [phone]  Patient is requesting a return call regarding the results of the tests following pt's recent stroke ( 10-8-17), caller wants to know if Dr Ferrera had a chance to review them, pls call

## 2017-10-23 NOTE — PROGRESS NOTES
"Physical Therapy   Treatment    [address]   MRN: [id number]        10/23/17 1300   PT Time Calculation   PT Start Time 1300   PT Stop Time 1430   PT Total Time (min) 90 min   Treatment   Treatment Type Treatment   PT/PTA PT   General   PT Received On 10/23/17   Family/Caregiver Present Yes  (2 friends from [employer])   Patient Found (position) Seated in wheelchair  (in room)   Pt found with (B gloves donned)   Precautions   General Precautions fall   Visual/Auditory Vision impaired  (wears glasses)   Subjective   Patient states "I'm pretty good"   Pain/Comfort   Pain Rating 1 no pain   Pain Rating Post-Intervention 1 no pain   Bed Mobility   Bed Mobility no   Transfers   Transfer yes   Sit to Stand   Sit <> Stand Assistance Minimum Assistance;Contact Guard Assistance   Sit <> Stand Assistive Device No Assistive Device;Rolling Walker   Trials/Comments Pt performed multiple trials off of differenct surfaces  (cues for forward trunk lean and hand placement)   Stand to Sit   Assistance Minimum Assistance;Contact Guard Assistance   Assistive Device No Assistive Device;Rolling Walker   Trials/Comments Pt performed multiple trials onto different surfaces  (cues for forward trunk lean and hand placement)   Chair to Mat   Chair<> Mat Technique Stand Pivot   Chair<>Mat Assistance Contact Guard Assistance   Chair <> Mat Assistive Device No Assistive Device   Trials/Comments 1 trial~ cues to slow down movements/wait for instructions   Mat to Chair   Technique Stand Pivot   Assistance Contact Guard Assistance   Assistive Device No Assistive Device   Trials/Comments 1 trial~ cues to slow down movements/wait for instructions   Wheelchair Activities   Propulsion Yes   Propulsion Type 1 Manual   Level 1 Level tile   Method 1 Right upper extremity;Left upper extremity;Right lower extremity;Left lower extremity   Level of Assistance 1 Supervision   Description/ Details 1 Pt propels ~150ft w/ supervision and B LEs.  Pt then " "propels ~50 ft w/ B UEs.  (cushion was removed for above activity)   Gait   Gait Yes   Weight Bearing Status full   Gait 1   Surface 1 Level tile   Gait Assistive Device Rolling walker  (Linen cart)   Other Apparatus 1 Other (Comment)  (gait belt)   Assistance 1 Contact Guard Assistance   Gait Distance Pt ambulated 2 trials of 285 ft w/ RW and CGA w/ seated rest break in between.  Pt requires minimal cueing for foot clearance on R LE, correct forward trunk lean, and to decrease speed during turns. Pt later ambulated ~285 ft w/ CGA while pushing linen cart.  Pt had no episodes of LOB during gait training.   Gait Pattern swing-through gait   Gait Deviation(s) decreased leila;increased time in double stance;decreased velocity of limb motion;decreased step length;decreased stride length;decreased toe-to-floor clearance;decreased weight-shifting ability;lateral lean;forward lean;decreased swing-to-stance ratio  (lateral lean to R)   Impairments Contributing to Gait Deviations impaired balance;impaired coordination;impaired motor control;impaired postural control;decreased strength   Stairs   Stairs Yes   Stairs/Curb Step   Rails 1 Bilateral   Device 1 No device   Other Apparatus 1 Other (Comment)  (gait belt)   Assistance 1 Contact guard   Comment/# Steps 1 pt ascends and descends 12 steps w/ B rails and CGA. Pt required cueing for hand placement during descend.  (pt ascends reciprocally and descends with step-to pattern)   Stairs/ Curb Step  2   Rails 2 None (Comment)   Device 2 Rolling walker   Other Apparatus 2 Other (Comment)  (gait belt)   Assistance 2 Minimum assistance;Contact Guard Assistance   Comment/# Steps 2 Pt performs 2 trials of ascend/descend 4" curb w/ RW w/ min A/CGA.    (Pt required cueing for management of RW and technique)   Balance   Balance Yes   Dynamic Standing Balance   Dynamic Standing-Balance Support Right upper extremity supported;Left upper extremity supported;Unilateral upper extremity " supported   Dynamic Standing-Balance Forward lean;Reaching for objects;Reaching across midline   Dynamic Standing-Comments Pt stands for ~6 min in parallel bars while taking off/putting on rings for ring tree task w/ CGA.   High Level Ambulation   Side Stepping Parallel bars   Side Stepping Comments Pt performed 3 trials w/ lateral stepping over small cones w/ CGA   Tandem Walking Forward Parallel bars   Tandem Walking Forward Comments pt performed 3 trials of tandem walking for length of parallel bars w/ CGA   Other Activities   Other Activities Other (Comment)   Comments Pt performs sit>stand off mat, alternating toe taps, stand >sit on mat w/ R UE supported on table for ~5 min w/ Avelino/CGA.  Pt performed seated forward trunk lean w/ B UE sliding on table over pillow cases for ~ 5 min.  Pt also kicked blue ball w/ SPT while seated in w/c for ~5 min, then bounced/caught/threw basketball while seated in w/c  for ~5 min.  (Pt required cueing for forward trunk lean for all sit<>stand)   Activity Tolerance   Activity Tolerance Patient tolerated treatment well   After Treatment   Patient Position After Treatment Other (comment)  (seated on toilet; RN was notified)   Patient after treatment left nursing notified   Assessment   Prognosis Good   Problem List Decreased strength;Decreased endurance;Impaired balance;Decreased mobility;Decreased coordination;Decreased cognition;Decreased safety awareness;Impaired vision;Impaired judgement   Session Assessment progressing toward goals   Assessment Pt did well today, and demonstrated increased endurance compared to previous sessions.  Pt was able to ambulate 3 separate trials in the hallway w/o LOB and required less cueing for management of RW.  Pt continues to be impulsive w/ mobility and requires constant cueing to slow down movements and wait for instructions.  Pt's potential for progress is good, but is limited by ataxia, decreased cognition, decreased postural control, and  decreased coordination.  Pt continues to slowly progress towards goal completion, but will require light assistance/guarding w/ all mobility upon discharge.   Level of Motivation/Participation Good   Barriers to Discharge None   Barriers to Discharge Comments  is retired and will be able to stay at home all day   Discharge Recommendations   Equipment Needed After Discharge bath bench;walker, rolling;wheelchair   Discharge Facility/Level Of Care Needs home health PT   Plan   Planned Therapy Intervention Continue with current plan;Bed mobility training;Transfer training;Gait training;Balance Training;Strengthening;Neuromuscular Re-education;Postural Re-education;Wheelchair Management/Propulsion;Motor Coordination Training   Therapy Frequency 2 times/day;daily;Monday-Friday;Saturday or Sunday   Physical Therapy Follow-up   PT Follow-up? Yes   PT - Next Visit Date 10/24/17   Treatment/Billable Minutes   Gait training 30   Therapeutic Activity 30   Neuromuscular Re-education 30   Total Time 90         Dianna Frankel, SPT  10/23/2017        I certify that I was present in the room directing the student in service delivery and guided him/her using my skilled judgment. As the co-signing therapist, I have reviewed the student's documentation and am responsible for the treatment, assessment and plan.        Ruddy Sanchez, PT

## 2017-10-24 LAB — MAGNESIUM SERPL-MCNC: 1.5 MG/DL

## 2017-10-24 PROCEDURE — 99232 SBSQ HOSP IP/OBS MODERATE 35: CPT | Mod: ,,, | Performed by: PHYSICAL MEDICINE & REHABILITATION

## 2017-10-24 PROCEDURE — 25000003 PHARM REV CODE 250: Performed by: PHYSICAL MEDICINE & REHABILITATION

## 2017-10-24 PROCEDURE — 92507 TX SP LANG VOICE COMM INDIV: CPT

## 2017-10-24 PROCEDURE — 97535 SELF CARE MNGMENT TRAINING: CPT

## 2017-10-24 PROCEDURE — 12800000 HC REHAB SEMI-PRIVATE ROOM

## 2017-10-24 PROCEDURE — 63600175 PHARM REV CODE 636 W HCPCS: Performed by: PHYSICAL MEDICINE & REHABILITATION

## 2017-10-24 PROCEDURE — 25000003 PHARM REV CODE 250: Performed by: STUDENT IN AN ORGANIZED HEALTH CARE EDUCATION/TRAINING PROGRAM

## 2017-10-24 PROCEDURE — 97116 GAIT TRAINING THERAPY: CPT

## 2017-10-24 PROCEDURE — 97150 GROUP THERAPEUTIC PROCEDURES: CPT

## 2017-10-24 PROCEDURE — 97530 THERAPEUTIC ACTIVITIES: CPT

## 2017-10-24 PROCEDURE — 36415 COLL VENOUS BLD VENIPUNCTURE: CPT

## 2017-10-24 PROCEDURE — 83735 ASSAY OF MAGNESIUM: CPT

## 2017-10-24 RX ADMIN — METOPROLOL SUCCINATE 50 MG: 50 TABLET, EXTENDED RELEASE ORAL at 09:10

## 2017-10-24 RX ADMIN — LEVETIRACETAM 750 MG: 750 TABLET ORAL at 09:10

## 2017-10-24 RX ADMIN — Medication 400 MG: at 08:10

## 2017-10-24 RX ADMIN — PANCRELIPASE 1 CAPSULE: 60000; 12000; 38000 CAPSULE, DELAYED RELEASE PELLETS ORAL at 12:10

## 2017-10-24 RX ADMIN — DOXYCYCLINE HYCLATE 100 MG: 100 TABLET, COATED ORAL at 05:10

## 2017-10-24 RX ADMIN — DRONABINOL 5 MG: 2.5 CAPSULE ORAL at 07:10

## 2017-10-24 RX ADMIN — Medication 400 MG: at 09:10

## 2017-10-24 RX ADMIN — PANCRELIPASE 1 CAPSULE: 60000; 12000; 38000 CAPSULE, DELAYED RELEASE PELLETS ORAL at 04:10

## 2017-10-24 RX ADMIN — AMITRIPTYLINE HYDROCHLORIDE 50 MG: 25 TABLET, FILM COATED ORAL at 08:10

## 2017-10-24 RX ADMIN — DRONABINOL 5 MG: 2.5 CAPSULE ORAL at 04:10

## 2017-10-24 RX ADMIN — RIVAROXABAN 20 MG: 20 TABLET, FILM COATED ORAL at 04:10

## 2017-10-24 RX ADMIN — AMLODIPINE BESYLATE 5 MG: 5 TABLET ORAL at 09:10

## 2017-10-24 RX ADMIN — PANCRELIPASE 1 CAPSULE: 60000; 12000; 38000 CAPSULE, DELAYED RELEASE PELLETS ORAL at 07:10

## 2017-10-24 RX ADMIN — ERLOTINIB HYDROCHLORIDE 150 MG: 150 TABLET, FILM COATED ORAL at 08:10

## 2017-10-24 RX ADMIN — ATORVASTATIN CALCIUM 40 MG: 20 TABLET, FILM COATED ORAL at 09:10

## 2017-10-24 RX ADMIN — LEVETIRACETAM 750 MG: 750 TABLET ORAL at 08:10

## 2017-10-24 NOTE — NURSING
2760 last documented bowel movement was on th 17 th, stated she had one two nights ago. Refusing laxative.

## 2017-10-24 NOTE — SUBJECTIVE & OBJECTIVE
Interval History: Pt without new c/o's.  I have reviewed the HPI and PMFSH and there are no changes from admission.       Scheduled Medications:    amitriptyline  50 mg Oral QHS    amLODIPine  5 mg Oral Daily    atorvastatin  40 mg Oral Daily    benzocaine   Mouth/Throat BID    chlorhexidine  15 mL Mouth/Throat BID    doxycycline  100 mg Oral Q12H    dronabinol  5 mg Oral BID AC    erlotinib  150 mg Oral QHS    hydrocortisone   Topical BID    levETIRAcetam  750 mg Oral BID    lipase-protease-amylase 12,000-38,000-60,000 units  1 capsule Oral TID WM    magnesium oxide  400 mg Oral BID    metoprolol succinate  50 mg Oral Daily    rivaroxaban  20 mg Oral Daily with dinner    senna-docusate 8.6-50 mg  1 tablet Oral BID       PRN Medications: bisacodyl, influenza, LORazepam, magnesium hydroxide 400 mg/5 ml, ondansetron, pneumoc 13-marcus conj-dip cr(PF), promethazine, ramelteon    Review of Systems   Constitutional: Negative for chills, fatigue and fever.   HENT: Negative for trouble swallowing and voice change.    Eyes: Negative for photophobia and visual disturbance.   Respiratory: Negative for cough, shortness of breath and wheezing.    Cardiovascular: Negative for chest pain and palpitations.   Gastrointestinal: Negative for abdominal distention and nausea.   Genitourinary: Negative for difficulty urinating and flank pain.   Musculoskeletal: Positive for gait problem. Negative for arthralgias.   Skin: Negative for color change and rash.   Neurological: Positive for speech difficulty and weakness. Negative for facial asymmetry, numbness and headaches.   Psychiatric/Behavioral: Negative for agitation and confusion.     Objective:     Vital Signs (Most Recent):  Temp: 98.2 °F (36.8 °C) (10/24/17 0700)  Pulse: 70 (10/24/17 0700)  Resp: 16 (10/24/17 0700)  BP: 115/64 (10/24/17 0700)  SpO2: (!) 94 % (10/24/17 0700)    Vital Signs (24h Range):  Temp:  [98.1 °F (36.7 °C)-98.2 °F (36.8 °C)] 98.2 °F (36.8  °C)  Pulse:  [70-75] 70  Resp:  [14-16] 16  SpO2:  [94 %-95 %] 94 %  BP: (115-132)/(62-64) 115/64     Physical Exam   Constitutional: She appears well-developed and well-nourished.   HENT:   Head: Normocephalic and atraumatic.   Sores on superior palate   Eyes: EOM are normal. Pupils are equal, round, and reactive to light.   Neck: Normal range of motion.   Cardiovascular: Normal rate and regular rhythm.    Pulmonary/Chest: Effort normal. No respiratory distress.   Abdominal: Soft. There is no tenderness.   Musculoskeletal: Normal range of motion. She exhibits no deformity.   Neurological:   -  Mental Status:  AAOx3.  Follows commands.  Answers correct age and .  Recent and remote memory intact.  No neglect.    -  Speech and language:  - aphasia + dysarthria.    -  Coordination:  Finger to nose exam:  RUE mild dysmetria, LUE dysmetria.   -  Motor:  RUE: 4/5, 4/5 .  LUE: 5/5, 5/5 .  RLE: 5/5, DF 5/5, PF 5/5.  LLE: 5/5, DF 5/5, PF 5/5.   -  pronator drift. negative  -  Tone:  normal  -  Sensory:  Intact to light touch and pin prick.       Skin: Skin is warm and dry.   Two scabs noted with some redness over occiput area   Psychiatric: She has a normal mood and affect. Her behavior is normal.   Vitals reviewed.    NEUROLOGICAL EXAMINATION:     CRANIAL NERVES     CN III, IV, VI   Pupils are equal, round, and reactive to light.  Extraocular motions are normal.

## 2017-10-24 NOTE — TREATMENT PLAN
"[facility] DME recommendations  DME to be delivered home unless otherwise specified.         [] NO DME NEEDED ________________________________________________________________________    [x]Walker:(can only select one of these)  [x]Adult (5'4"-6'6") []Carlos (4'4"-5'7")                           [] Wide/ Heavy Duty         []Mendel                  []  Rollator                                                 [] knee walker       Accessories/Other:____________________________________     Wheels:(must complete) [x] Yes  [] No     []Crutches:  []Axillary []Loft strand    []Cane:              []Straight []Narrow based quad   []Wide based quad         [] Other:____________________________________________    [] Transfer Devices:   []Pawel Lift    []Slide Board ( [] with cut out  []26  []29)    ______________________________________________________________________    [x]Wheelchair: (please check)    Number of hours up in wheelchair per     day:__________     Style:  [] Standard [] Pediatric  [x] Light weight  []Reclining     []Amputee [] Mendel [] POV []Custom     Custom Vendor:________________________________________                                                      Seat Width: [x]18 (standard adult)    []16" (small adult)   []14(child)      [] 20  [] 22 [] 24         Seat Depth:   [x]16    []18  []20      Back Height:    [x]Standard      []Mendel          []Other     Leg Support: [x]Standard [x]Heel loops []Elevating leg rest    []Articulating              [x]Swing Away     Arm Height:  []Detachable [x]Full   [] Desk  [x]Swing Away [] Adjustable Height          Lap Belt: []Velcro [x]Buckle                               Accessories: [x] Front brakes          [] Brake Extensions  [x]Anti-tippers                          []Safety Belt    Other:_______________________________________________     Cushion: [x]Basic []Foam []Gel  []Roho []Mikel I      Justification for Cushion(Must complete):_skin " protection_____________________________    ______________________________________________________________________        For wheelchair order: (please choose all that apply)  - Caregiver is capable and willing to operate wheelchair safely. [x]  - Patients upper body strength is sufficient for propulsion. []   - The patient has a cast, brace, or musculoskeletal condition which prevents 90 degree flexion of the knee. []  - The patient has significant edema of the lower extremities that requires elevating leg rests.  []  - A reclining back is ordered. []  - The patient requires the use of a wheel chair for ADLs within the home. []  - Patient mobility limitations cannot be sufficiently resolved by the use of other ambulatory therapies. [x]         __    []3 in 1 commode: []Standard     []Wide-Heavy Duty           []Drop arm                                      []Heavy Duty Drop Arm                              []Tub bench:  []Padded      [](Unpadded=standard)     []Commode Opening                                          [] Heavy Duty    []Shower Chair: []With back   []Without back      []Hospital Bed: []Semi Electric    []Manual    []Electric   []Heavy Duty  []Extra Heavy Duty(>600#)  Patient requires a bed height different than a fixed height hospital bed to permit transfers to chair, wheel chair or standing. []Yes         []N/A     []Accessories: [] Gel Overlay Mattress     []Low Air Mattress   []Alternating Pressure Pad                              []Trapeze    [] Hip Kit:  [] Short Horn     [] Long Horn         []Other:________________________________________________________________    ________________________________________________________________________    []Home Health:  []OT []PT []SLP   [] MSW   [] Aide    []SN        [x]Outpatient:  [x]OT [x]PT [x]SLP [] MSW   [] CM      [] Internal Referral      [] External Referral  ________________________________________________________________________    Ruddy C  Daniel, PT 10/24/2017

## 2017-10-24 NOTE — PLAN OF CARE
Problem: Patient Care Overview  Goal: Plan of Care Review  Outcome: Ongoing (interventions implemented as appropriate)  Plan of care review: Ongoing   Free of falls and injury, frequent safety rounds met. Skin integrity maintained without skin breakdown. Able to transfer with minimal assistance, instructed not to get out of bed without assistant. Tolerating foods and liquids without difficulty.

## 2017-10-24 NOTE — PLAN OF CARE
Problem: Fall Risk (Adult)  Intervention: Safety Promotion/Fall Prevention  Had a fall 10/23/17 at 0840. See other charting.

## 2017-10-24 NOTE — PROGRESS NOTES
"Physical Therapy   Treatment/Re-Assessment    [address]   MRN: [id number]          10/24/17 1445   PT Time Calculation   PT Start Time 1445   PT Stop Time 1545   PT Total Time (min) 60 min   Treatment   Treatment Type Treatment;Other (see comments)  (Re-Assessment)   PT/PTA PT   General   PT Received On 10/24/17   Family/Caregiver Present No   Patient Found (position) Seated in wheelchair  (in room)   Pt found with (B latex gloves donned; posey belt in place)   Precautions   General Precautions fall   Visual/Auditory Vision impaired  (wears glasses)   Subjective   Patient states "I'm ready!"   Pain/Comfort   Pain Rating 1 no pain   Pain Rating Post-Intervention 1 no pain   Bed Mobility   Bed Mobility no   Transfers   Transfer yes   Sit to Stand   Sit <> Stand Assistance Contact Guard Assistance   Sit <> Stand Assistive Device No Assistive Device   Trials/Comments Pt performs multiple trials off of different surfaces  (CGA for steadying; cues for hand placement)   Stand to Sit   Assistance Minimum Assistance;Contact Guard Assistance  (steadying needed at times)   Assistive Device No Assistive Device   Trials/Comments multiple trials onto different surfaces   Chair to Mat   Chair<> Mat Technique Stand Pivot   Chair<>Mat Assistance Contact Guard Assistance   Chair <> Mat Assistive Device No Assistive Device   Trials/Comments 1 trial~ cues for hand placement   Mat to Chair   Technique Stand Pivot   Assistance Minimum Assistance;Contact Guard Assistance  (steadying assistance)   Assistive Device No Assistive Device   Trials/Comments 1 trial~ pt had one small episode of LOB but recovered w/ min A   Tub Bench Transfer   Technique Other (see comments)   Assistance Contact Guard Assistance   Assistive Device No Assistive Device   Trials/Comments 1 trial~ pt was able to place UEs on wall and step over tub and then stand>sit on tub bench.  Pt then sit>stand and had UEs on wall while stepping out of tub and back to " w/c.  Pt required cues for hand/foot placement and technique.   Car Transfer   Car Transfer Technique Stand Pivot   Car Transfer Assistance Contact Guard Assistance   Car Transfer Assistive Device No Assistive Device   Trials/Comments 1 trial~ pt used UEs appropriately by placing them on car door/dashboard and was able to get in/out of car w/ CGA for safety.   Wheelchair Activities   Propulsion Yes   Propulsion Type 1 Manual   Level 1 Level tile   Method 1 Right lower extremity;Left lower extremity   Level of Assistance 1 Supervision   Description/ Details 1 Pt propels w/c for ~200 ft w/ supervision   Gait   Gait Yes   Weight Bearing Status full   Gait 1   Surface 1 Level tile;Carpet;Ramp   Gait Assistive Device Rolling walker   Other Apparatus 1 Other (Comment)  (gait belt)   Assistance 1 Contact Guard Assistance   Gait Distance Pt ambulated ~300 ft w/ RW and CGA w/o any episodes of LOB.  Pt required topographical assistance when obstacles were present. Pt later ambulated on carpet w/ RW for a couple of feet before ambulating up and down ramp( 28 feet ascent and descent) x 2 trials w/ CGA.  Pt required cues to slow down movements during turns and going down ramp.   Gait Pattern swing-through gait   Gait Deviation(s) decreased leila;increased time in double stance;decreased velocity of limb motion;decreased step length;decreased stride length;decreased toe-to-floor clearance;decreased weight-shifting ability;lateral lean;forward lean   Impairments Contributing to Gait Deviations impaired balance;impaired coordination;impaired motor control;impaired postural control;decreased strength   Stairs   Stairs Yes   Stairs/Curb Step   Rails 1 Right;Left   Device 1 No device   Other Apparatus 1 Other (Comment)  (gait belt)   Assistance 1 Contact guard   Comment/# Steps 1 pt ascends/descends 12 steps w/ B UEs on L rail during ascend and R rail during descend.  Pt ascended stairs w/ reciprocal pattern; pt descended 8 steps w/  "step-to gait and 4 steps w/ reciprocal pattern.   Stairs/ Curb Step  2   Rails 2 None (Comment)   Device 2 Rolling walker   Other Apparatus 2 Other (Comment)  (gait belt)   Assistance 2 Minimum assistance;Contact Guard Assistance   Comment/# Steps 2 Pt ascends and descends 4" curb w/ CGA and min A for turns.  Pt required cueing for technique, foot placement, and management of RW.   Balance   Balance Yes   Static Standing Balance   Static Standing-Balance Support No upper extremity supported   Static Standing-Level of Assistance Minimum assistance;Contact guard   Static Standing-Comment/# of Minutes pt stands near edge of mat for 3:06 w/ 1 episode of small LOB but was corrected w/ min A.   Other Activities   Other Activities Other (Comment)   Comments pt performed 3-in-1 commode transfer using stand pivot method w/ CGA. Pt required cueing for hand placement and technique.   Activity Tolerance   Activity Tolerance Patient tolerated treatment well   Other Comments   Comments Pt was educated on the importance of pressing the call button if she needs to get out of the wheelchair or if something is not within reach.  Pt expressed understanding.   After Treatment   Patient Position After Treatment Seated in wheelchair  (in room; posey belt in place)   Patient after treatment left call button in reach   Assessment   Prognosis Good   Problem List Decreased strength;Decreased endurance;Impaired balance;Decreased mobility;Decreased coordination;Decreased cognition;Impaired judgement;Decreased safety awareness   Session Assessment progressing toward goals   Assessment Pt tolerated treatment well today and demonstrated increased focus on the task at hand.  Pt continues to be impulsive w/ all mobility and does not wait for instructions before starting task; therefore, posey belt is donned between activities until the directions are explained.  Pt did well w/ her transfers and mobility skills, but requires contact assistance to " ensure safety since she is easily distracted and forgets the directions quickly after they are explained.  She met 5/11 long term goals, but is limited by decreased cognition, decreased safety awareness, decreased postural control, and decreased balance.  Pt will need light assistance w/ mobility at discharge.   Level of Motivation/Participation good   Barriers to Discharge Decreased caregiver support   Barriers to Discharge Comments  is retired and can stay home w/ pt, but his health is in question since he was admitted to the ED last week per pt report.   Long Term Goals   Transfer Sit to stand - Met/Not Met Not Met   Transfer Bed/Chair - Met/Not Met Not Met   Ambulate - Met/Not Met Met   Go Up/Down Stairs - Met/Not Met Met   Go Up/Down Curb- Met/Not Met Not Met   Stand - Met/Not Met Not Met   Tolerate Exercise - Met/Not Met Met   Propel Wheelchair - Met/Not Met Met   Other Goal Not Met   Other Goal 2 Met   Other Goal 3 Not Met   Discharge Recommendations   Equipment Needed After Discharge walker, rolling;wheelchair   Discharge Facility/Level Of Care Needs outpatient PT  (pending transportation)   Plan   Planned Therapy Intervention Continue with current plan;Bed mobility training;Transfer training;Gait training;Balance Training;Strengthening;Neuromuscular Re-education;Motor Coordination Training;Wheelchair Management/Propulsion;Postural Re-education   Therapy Frequency 2 times/day;daily;Monday-Friday;Saturday or Sunday   Plan of Care Expires on 10/26/17   Physical Therapy Follow-up   PT Follow-up? Yes   PT - Next Visit Date 10/25/17   Treatment/Billable Minutes   Gait training 20   Therapeutic Activity 40   Total Time 60         DYLON Muñoz  10/24/2017        I certify that I was present in the room directing the student in service delivery and guided him/her using my skilled judgment. As the co-signing therapist, I have reviewed the student's documentation and am responsible for the treatment,  assessment and plan.        Ruddy Sanchez, PT

## 2017-10-24 NOTE — PROGRESS NOTES
Physical Therapy   [facility] Scores    [address]   MRN: [id number]          10/24/17 1446   Transfers   Bed/Chair/WC 4   Toilet 4   Tub 4   Locomotion   Distance Walked 3   Distance Wheelchair 3   Walk 4   Wheelchair 5   Mode C   Stairs 4       Dianna DYLON Frankel  10/24/2017      I certify that I was present in the room directing the student in service delivery and guided him/her using my skilled judgment. As the co-signing therapist, I have reviewed the student's documentation and am responsible for the treatment, assessment and plan.        Ruddy Sanchez, PT

## 2017-10-24 NOTE — PROGRESS NOTES
Speech Therapy   Treatment/ Weekly Progress Note    Naty St   MRN: [id number]         Short Term Goals   Goal 1 Pt will be oriented x4 indly with 100% acc. CONTINUE   Goal 2 Pt will complete language comprehension tasks given supervision with 90% acc targeting attention/comprehension skills.  CONTINUE   Goal 3 Pt will complete expressive language tasks targeting vocal intensity and word finding given min verbal cues with 75% acc. CONTINUE   Goal 4 Pt will complete problem solving/reasoning/sequencing tasks given min verbal and visual cues with 75% acc.  CONTINUE   Goal 5 Pt will complete recall task of recent/functional information given moderate verbal cues with 60% acc.  CONTINUE   Long Term Goals   Goal 1 Pt will complete language comprehension tasks with modified independence with % acc targeting attention/comprehension skills.    Goal 2 Pt will complete expressive language tasks targeting vocal intensity and word finding given supervision with 90% acc.    Goal 3 Pt will complete problem solving/reasoning/sequencing tasks given supervision with 90% acc.    Goal 4 Pt will complete recall task of recent/functional information given minimal verbal cues with 80% acc.    Goal 5 Pt will complete complex financial/medication management tasks given supervision with 90% acc.            10/24/17 0900   Speech Time Calculation   Speech Start Time 0900   Speech Stop Time 0945   Speech Total (min) 45 min   General Information   SLP Treatment Date 10/24/17   General Observations Patient seen for individual therapy session while sitting upright in w/c in ST office. Patient demonstrated decreased word fluency in conversation and in structured tasks as compared to previous session.   General Precautions fall   Visual/Auditory Vision impaired  (glasses)   Pain/Comfort   Pain Rating no pain       SLP Cognitive Treatment   Treatment Detail (SLP Cognitive Treatment) Patient oriented x4 independently. Recalled  details from previous day with no difficulty. Patient participated in problem solving task targeting safety awareness. Patient provided solutions to hypothetical hospital safety situations with 100% accy independently. ST educated patient and encouraged pt to utilize safe problem solving skills in function to prevent falls. Pt verbalized understanding, but needs reinforcement.       SLP Treatment: Verbal Expression   Treatment Detail (SLP Verbal Expression) Patient participated in naming/describing task involving photo cue cards. Patient identified words being described by ST with 80% accuracy given min verbal cues. In a similar task, patient described words to ST with 60% accy independently, 70% accuracy given mod A. Patient presented with decreased speech intelligibility and decreased word fluency, as characterized by fast rate of speech and slurred quality. ST educated pt regarding clear speech strategies, pt verbalized understanding but needs reinforcement.        SLP Treatment: Auditory Comprehension   Treatment Detail (SLP Auditory Comprehension) To improve receptive language, pt participated in auditory comprehension task. Patient answered complex yes/no questions with 100% accuracy independently. Patient answered auditory comprehension questions from a brief passage with 90% accuracy independently and given mod verbal cues.    Assessment   SLP Diagnosis mod cog-ling deficits   Prognosis Good   Problem List decreased recall; decreased word fluency; decreased safety awareness; decreased insight   Session Assessment progressing toward goals   Level of Motivation/Participation Good   Discharge Recommendations   Discharge Facility/Level Of Care Needs home health speech therapy   Plan   Plan Continue with current plan   Therapy Frequency Monday-Friday   SLP Follow-up   SLP Follow-up? Yes   Treatment/Billable Minutes   Speech Therapy Individual 45   Total Time 45       FIM Scores  Comprehension:4  Expression:  3  Social Interaction:4  Problem Solving:3  Memory: 2     Comprehension:  Complex= humor, finances, rationale for medical treatment(hip precautions, pressure relief)  Basic= pain, hunger, thirst, bathroom needs, cold, nutrition, sleep     Understands basic 75-89%- may need words repeated (4)     Expression:  Expresses basic 50-74% of time - Needs to repeat parts of sentences  (3)     Social Interaction:  Interacts appropriately 75-89% of time - needs redirection for appropriate language or to initiate interaction  (4)     Problem Solving:  Solves basic problems 50-74% of the time  (3)     Memory:  Recognizes, recalls, or executes 25-49% of time 1 step of 2 step request  (2)       LEONEL Marcus-YONIS  10/24/2017

## 2017-10-24 NOTE — ASSESSMENT & PLAN NOTE
PT/OT/SLP    Transfers   Bed/Chair/WC 4   Self Care   Dressing-Upper 4   Dressing-Lower 5   Toileting 5

## 2017-10-24 NOTE — PLAN OF CARE
Problem: Patient Care Overview  Goal: Plan of Care Review  Outcome: Ongoing (interventions implemented as appropriate)  Pt care plan updated and individualized as needed and progress continues.  See associated flowsheets for relevant documentation. Frequent rounds made per protocol to maintain pt safety and meet pt needs.  Continuing to monitor and follow up as needed.  Pt remains free from falls or trauma thus far this shift.  Pt denies new complaints this shift.

## 2017-10-24 NOTE — PROGRESS NOTES
"Occupational Therapy   FM Group    Naty St  MRN: [id number]  Room/Bed: E278/E278 A       10/24/17 1115   OT Time Calculation   OT Start Time 1115   OT Stop Time 1200   OT Total Time (min) 45 min   General   OT Date of Treatment 10/24/17   Family/Caregiver Present No   Precautions   General Precautions fall   Visual/Auditory Vision impaired   Subjective   Patient states "I have trouble with this R hand."    Pain/Comfort   Pain Rating no pain   OT Therapeutic Groups   Other Pt. Participated in 45 minute FM coordination Group.  The activity was performed to increase R UE strength, coordination, FM manipulation, eye-hand coordination.  Pt. Performed FM coordination activity with R hand with nuts and bolts, lacing cards, opening various small items in Make-up kit to inc. Functional use of R hand and coordination. Pt. Was able to complete tasks with Supervision.  Pt. Demonstrated increased Dewey with FM tasks.  These activities were performed in a group setting to encourage increased participation with peers and social interaction skills.       Treatment/Billable Minutes   Therapeutic Group 45   Total Time 45       LEOBARDO Kelly  10/24/2017    "

## 2017-10-24 NOTE — PROGRESS NOTES
Pt discussed during treatment team staffing.  Expected discharge date is 10/26/17.  Pt informed and agrees with discharge date.  Discharge plan is home with family and home health.

## 2017-10-24 NOTE — PROGRESS NOTES
"Occupational Therapy   AM Treatment/Re-assessment    Naty St   MRN: [id number]    10/24/17 1015   OT Time Calculation   OT Start Time 1015   OT Stop Time 1100   OT Total Time (min) 45 min   General   OT Date of Treatment 10/24/17   Family/Caregiver Present No   Patient Found (position) Seated in bedside chair   Precautions   General Precautions fall   Visual/Auditory Vision impaired  (glasses)   Subjective   Patient states "I think I'm steady"   Pain/Comfort   Pain Rating no pain   Sit to Stand   Sit <> Stand Assistance Stand By Assistance;Contact Guard Assistance   Sit <> Stand Assistive Device Rolling Walker   Trials/Comments cues for hand placement, pt attempts to pull up on walker   Stand to Sit   Assistance Contact Guard Assistance   Assistive Device Rolling Walker   Trials/Comments cues to reach back prior to sitting   Chair to Mat   Chair <>Mat Technique Stand Pivot   Chair <> Mat Assistance Contact Guard Assistance   Chair<> Mat Assistive Device Rolling Walker   UE Dressing   UE Dressing Level of Assistance Minimum assistance   UE Dressing Where Assessed Wheelchair   UE Dressing Comments assist to button buttons, significantly increased time to button, pt getting frustrated with trial   LE Dressing   LE Dressing  Level of Assistance Contact guard   LE Dressing Level of Assistance Contact guard   Sock Level of Assistance Supervision   Shoe Level of Assistance Supervision   LE Dressing Where Assessed Wheelchair   LE Dressing Comments steadying assist close supervision for safety    Toileting   Toileting Level of Assistance Stand by assistance   Toileting Where Assessed Bedside commode   Additional Activities   Additional Activities Comments  - UE Fine motor/coordination addressed with the following:   - Yellow theraputty and pegs using R hand to retrieve pegs   - Vinayak FM instrument using R hand only    Activity Tolerance   Activity Tolerance Patient tolerated treatment well   Medical Staff " Made Aware NSG   After Treatment   Patient Position After Treatment Seated in wheelchair   Patient after treatment left call button in reach   Assessment   Prognosis Fair   Problem List Decreased Self Care skills;Decreased upper extremity strength;Decreased safe judgment during ADL;Decreased cognition;Decreased endurance;Decreased fine motor control;Decreased functional mobility;Decreased gross motor control;Decreased IADLs;Decreased Function of right upper extremity;Decreased trunk control for functional activities   Assessment Pt re-assessed today and has met 8/9 LTG. Pt is making progress with self care but continues to demo impulsive behavior and poor carryover of safety awareness during transfers and self care. Pt will need 24 hr S at discharge.     Level of Motivation/Participation good   Long Term Goals   Pt Will Transfer Bed/Chair With RW;With contact guard assistance   Transfer Bed/Chair - Met/Not Met Met   Pt Will Transfer To Bedside Commode With contact guard assist;With RW   Transfer Bedside Commode -Met/Not Met Met   Pt Will Transfer To Shower With RW;With contact guard assist   Transfer to Shower-Met/Not Met Met   Pt Will Perform Eating With modified independence;New goal   Eating - Met/Not Met Not Met   Pt Will Perform Grooming Independently;New goal   Grooming - Met/Not Met Not Met   Pt Will Perform Bathing With contact guard assistance;New goal   Bathing - Met/Not Met Met   Pt Will Perform UE Dressing With modified independence;New goal   UE Dressing - Met/Not Met Met   Pt Will Perform LE Dressing With contact guard assistance;New goal   LE Dressing - Met/Not Met Met   Pt Will Perform Toileting With contact guard assistance   Toileting-Met/Not Met Met   Discharge Recommendations   Equipment Needed After Discharge bath bench;walker, rolling;wheelchair   Discharge Facility/Level Of Care Needs home health OT   Plan   Plan Continue with current plan   Therapy Frequency 2 times/day;Monday-Friday;Saturday  or Sunday   Occupational Therapy Follow-up   OT Follow-up? Yes   Treatment/Billable Minutes   Self Care/Home Management 15   Therapeutic Activity 30   Total Time 45     LEOBARDO Bustillos   10/24/2017

## 2017-10-24 NOTE — PROGRESS NOTES
[facility]  Physical Medicine & Rehab  Progress Note    Patient Name: Naty St  MRN: [id number]  Patient Class: IP- Rehab   Admission Date: 10/11/2017  Length of Stay: 13 days  Attending Physician: Yordy Ponce MD  Primary Care Provider: Olvin Mars MD    Subjective:     Principal Problem: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    Interval History: Pt without new c/o's.  I have reviewed the HPI and PMFSH and there are no changes from admission.       Scheduled Medications:    amitriptyline  50 mg Oral QHS    amLODIPine  5 mg Oral Daily    atorvastatin  40 mg Oral Daily    benzocaine   Mouth/Throat BID    chlorhexidine  15 mL Mouth/Throat BID    doxycycline  100 mg Oral Q12H    dronabinol  5 mg Oral BID AC    erlotinib  150 mg Oral QHS    hydrocortisone   Topical BID    levETIRAcetam  750 mg Oral BID    lipase-protease-amylase 12,000-38,000-60,000 units  1 capsule Oral TID WM    magnesium oxide  400 mg Oral BID    metoprolol succinate  50 mg Oral Daily    rivaroxaban  20 mg Oral Daily with dinner    senna-docusate 8.6-50 mg  1 tablet Oral BID       PRN Medications: bisacodyl, influenza, LORazepam, magnesium hydroxide 400 mg/5 ml, ondansetron, pneumoc 13-marcus conj-dip cr(PF), promethazine, ramelteon    Review of Systems   Constitutional: Negative for chills, fatigue and fever.   HENT: Negative for trouble swallowing and voice change.    Eyes: Negative for photophobia and visual disturbance.   Respiratory: Negative for cough, shortness of breath and wheezing.    Cardiovascular: Negative for chest pain and palpitations.   Gastrointestinal: Negative for abdominal distention and nausea.   Genitourinary: Negative for difficulty urinating and flank pain.   Musculoskeletal: Positive for gait problem. Negative for arthralgias.   Skin: Negative for color change and rash.   Neurological: Positive for speech difficulty and weakness. Negative for  facial asymmetry, numbness and headaches.   Psychiatric/Behavioral: Negative for agitation and confusion.     Objective:     Vital Signs (Most Recent):  Temp: 98.2 °F (36.8 °C) (10/24/17 0700)  Pulse: 70 (10/24/17 0700)  Resp: 16 (10/24/17 0700)  BP: 115/64 (10/24/17 0700)  SpO2: (!) 94 % (10/24/17 0700)    Vital Signs (24h Range):  Temp:  [98.1 °F (36.7 °C)-98.2 °F (36.8 °C)] 98.2 °F (36.8 °C)  Pulse:  [70-75] 70  Resp:  [14-16] 16  SpO2:  [94 %-95 %] 94 %  BP: (115-132)/(62-64) 115/64     Physical Exam   Constitutional: She appears well-developed and well-nourished.   HENT:   Head: Normocephalic and atraumatic.   Sores on superior palate   Eyes: EOM are normal. Pupils are equal, round, and reactive to light.   Neck: Normal range of motion.   Cardiovascular: Normal rate and regular rhythm.    Pulmonary/Chest: Effort normal. No respiratory distress.   Abdominal: Soft. There is no tenderness.   Musculoskeletal: Normal range of motion. She exhibits no deformity.   Neurological:   -  Mental Status:  AAOx3.  Follows commands.  Answers correct age and .  Recent and remote memory intact.  No neglect.    -  Speech and language:  - aphasia + dysarthria.    -  Coordination:  Finger to nose exam:  RUE mild dysmetria, LUE dysmetria.   -  Motor:  RUE: 4/5, 4/5 .  LUE: 5/5, 5/5 .  RLE: 5/5, DF 5/5, PF 5/5.  LLE: 5/5, DF 5/5, PF 5/5.   -  pronator drift. negative  -  Tone:  normal  -  Sensory:  Intact to light touch and pin prick.       Skin: Skin is warm and dry.   Two scabs noted with some redness over occiput area   Psychiatric: She has a normal mood and affect. Her behavior is normal.   Vitals reviewed.    NEUROLOGICAL EXAMINATION:     CRANIAL NERVES     CN III, IV, VI   Pupils are equal, round, and reactive to light.  Extraocular motions are normal.       Assessment/Plan:      Naty St is a 72 y.o. female admitted to inpatient rehabilitation on 10/11/2017 for Cerebral infarction due to embolism of  right cerebellar artery with impaired mobility and ADLs. Patient remains appropriate for PT, OT, and as required Speech therapy. Patient continues to require 24 hour nursing care as well as daily Physician assessment.    * 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    PT/OT/SLP    Transfers   Bed/Chair/WC 4   Self Care   Dressing-Upper 4   Dressing-Lower 5   Toileting 5               Folliculitis    Miconazole ordered BID        Ataxia    Likely 2/2 stroke. Continue working with PT/OT        CKD (chronic kidney disease) stage 3, GFR 30-59 ml/min    Uptrending. Will continue to monitor. Encouraged PO fluids.        Hypomagnesemia    1.7 today. Continue Mag Oxide 400mg BID. Continue to monitor        Essential hypertension    Controlled. Continue meds.            DISCHARGE PLANNING:  Tentative Discharge Date: 10/26/2017    Future Appointments  Date Time Provider Department Center   11/9/2017 9:30 AM [facility] PET CT LIMIT 500 LBS [facility] CT [facility]   11/9/2017 2:00 PM Pablo Lr MD [facility] STROKE Reece Milan   11/10/2017 9:30 AM LAB, HEMONC CANCER BLDG [facility] HO [facility]   11/10/2017 10:30 AM Karrie Vazquez MD [facility] HEM ONC [facility]   11/10/2017 11:30 AM INJECTION, [facility] INFUSION [facility] CHEMO [facility]   12/5/2017 1:30 PM N[facility] MRI2 N[facility] MRI Reece ruben Ponce MD  Department of Physical Medicine & Rehab   [facility]

## 2017-10-24 NOTE — PROGRESS NOTES
Occupational Therapy   FIM Scores    Naty St   MRN: [id number]      10/24/17 1015   Transfers   Bed/Chair/WC 4   Self Care   Dressing-Upper 4   Dressing-Lower 5   Toileting 5     LEOBARDO Bustillos   10/24/2017

## 2017-10-25 PROCEDURE — 92507 TX SP LANG VOICE COMM INDIV: CPT

## 2017-10-25 PROCEDURE — 12800000 HC REHAB SEMI-PRIVATE ROOM

## 2017-10-25 PROCEDURE — 97530 THERAPEUTIC ACTIVITIES: CPT

## 2017-10-25 PROCEDURE — 25000003 PHARM REV CODE 250: Performed by: PHYSICAL MEDICINE & REHABILITATION

## 2017-10-25 PROCEDURE — 97150 GROUP THERAPEUTIC PROCEDURES: CPT

## 2017-10-25 PROCEDURE — 63600175 PHARM REV CODE 636 W HCPCS: Performed by: PHYSICAL MEDICINE & REHABILITATION

## 2017-10-25 PROCEDURE — 99232 SBSQ HOSP IP/OBS MODERATE 35: CPT | Mod: ,,, | Performed by: PHYSICAL MEDICINE & REHABILITATION

## 2017-10-25 PROCEDURE — 25000003 PHARM REV CODE 250: Performed by: STUDENT IN AN ORGANIZED HEALTH CARE EDUCATION/TRAINING PROGRAM

## 2017-10-25 PROCEDURE — 97116 GAIT TRAINING THERAPY: CPT

## 2017-10-25 PROCEDURE — 97535 SELF CARE MNGMENT TRAINING: CPT

## 2017-10-25 RX ADMIN — DOXYCYCLINE HYCLATE 100 MG: 100 TABLET, COATED ORAL at 05:10

## 2017-10-25 RX ADMIN — PANCRELIPASE 1 CAPSULE: 60000; 12000; 38000 CAPSULE, DELAYED RELEASE PELLETS ORAL at 05:10

## 2017-10-25 RX ADMIN — Medication 400 MG: at 08:10

## 2017-10-25 RX ADMIN — ATORVASTATIN CALCIUM 40 MG: 20 TABLET, FILM COATED ORAL at 08:10

## 2017-10-25 RX ADMIN — LEVETIRACETAM 750 MG: 750 TABLET ORAL at 08:10

## 2017-10-25 RX ADMIN — HYDROCORTISONE: 10 CREAM TOPICAL at 08:10

## 2017-10-25 RX ADMIN — RIVAROXABAN 20 MG: 20 TABLET, FILM COATED ORAL at 06:10

## 2017-10-25 RX ADMIN — DRONABINOL 5 MG: 2.5 CAPSULE ORAL at 05:10

## 2017-10-25 RX ADMIN — DRONABINOL 5 MG: 2.5 CAPSULE ORAL at 08:10

## 2017-10-25 RX ADMIN — PANCRELIPASE 1 CAPSULE: 60000; 12000; 38000 CAPSULE, DELAYED RELEASE PELLETS ORAL at 08:10

## 2017-10-25 RX ADMIN — METOPROLOL SUCCINATE 50 MG: 50 TABLET, EXTENDED RELEASE ORAL at 08:10

## 2017-10-25 RX ADMIN — AMLODIPINE BESYLATE 5 MG: 5 TABLET ORAL at 08:10

## 2017-10-25 RX ADMIN — AMITRIPTYLINE HYDROCHLORIDE 50 MG: 25 TABLET, FILM COATED ORAL at 09:10

## 2017-10-25 RX ADMIN — PANCRELIPASE 1 CAPSULE: 60000; 12000; 38000 CAPSULE, DELAYED RELEASE PELLETS ORAL at 12:10

## 2017-10-25 RX ADMIN — LORAZEPAM 1 MG: 1 TABLET ORAL at 09:10

## 2017-10-25 NOTE — PROGRESS NOTES
Occupational Therapy   Transfer Group    Naty St  MRN: [id number]  Room/Bed: E278/E278 A       10/25/17 1115   OT Time Calculation   OT Start Time 1115   OT Stop Time 1200   OT Total Time (min) 45 min   General   OT Date of Treatment 10/25/17   Family/Caregiver Present No   Patient Found (position) Seated in wheelchair   Precautions   General Precautions fall   Visual/Auditory Vision impaired   Subjective   Patient states Pt. with no complaints   Pain/Comfort   Pain Rating no pain   OT Therapeutic Groups   Other Patient participated in 45 minute functional transfers group. The group activity challenged bilateral upper extremity and lower extremity strengthening. In addition, cardiovascular and functional endurance were challenged during this group. Patient completed bed mobility from supine to sitting edge of mat with (I) assistance. Pt required Min assistance for w/c management parts. Patient completed toilet transfer with CGA assistance and use of (any AE/DME). Patient completed (shower and/or tub) transfer with CGA assistance and use of (any AE/DME).  Patient completed (#) sit to stand transfer(s) from the couch/chair in gym with SBA assistance and (use of FWW). Patient demonstrated appropriate safety awareness with functional transfers. Educated patient on compensatory and adaptive techniques for functional home transfers. Patient demonstrated increased independence with all transfers (or list specific transfers). These activities were performed in a group setting to encourage increased participation with peers and social interaction skills.     Treatment/Billable Minutes   Therapeutic Group 45   Total Time 45       LEOBARDO Kelly  10/25/2017

## 2017-10-25 NOTE — PROGRESS NOTES
Speech Therapy   Treatment/ Discharge Note    Naty St   MRN: [id number]              Short Term Goals   Goal 1 Pt will be oriented x4 indly with 100% acc. GOAL MET   Goal 2 Pt will complete language comprehension tasks given supervision with 90% acc targeting attention/comprehension skills.  CONTINUE   Goal 3 Pt will complete expressive language tasks targeting vocal intensity and word finding given min verbal cues with 75% acc. CONTINUE   Goal 4 Pt will complete problem solving/reasoning/sequencing tasks given min verbal and visual cues with 75% acc. GOAL MET   Goal 5 Pt will complete recall task of recent/functional information given moderate verbal cues with 60% acc. GOAL MET   Long Term Goals   Goal 1 Pt will complete language comprehension tasks with modified independence with % acc targeting attention/comprehension skills.    Goal 2 Pt will complete expressive language tasks targeting vocal intensity and word finding given supervision with 90% acc.    Goal 3 Pt will complete problem solving/reasoning/sequencing tasks given supervision with 90% acc. CONTINUE   Goal 4 Pt will complete recall task of recent/functional information given minimal verbal cues with 80% acc. CONTINUE   Goal 5 Pt will complete complex financial/medication management tasks given supervision with 90% acc. CONTINUE        ST recommending patient continue to receive skilled ST services targeting mild-moderate cognitive-linguistic deficits. T/o length of stay, pt demonstrated increased recall and auditory comprehension skills. ST d/w patient safety awareness upon d/c, needs reinforcement.        10/25/17 0901   Speech Time Calculation   Speech Start Time 0901   Speech Stop Time 0946   Speech Total (min) 45 min   General Information   SLP Treatment Date 10/25/17   General Observations Patient seen while sitting upright in w/c in ST office. Patient demonstrated increased recall of recent information. Continues to demonstrated  "slurred speech with decreased intelligibility.    General Precautions fall   Visual/Auditory Vision impaired  (wears)   Subjective   Patient states "I woke up 5am this morning."   Pain/Comfort   Pain Rating no pain       SLP Cognitive Treatment   Treatment Detail (SLP Cognitive Treatment) Oriented x4 independently.  Recalled recent information with min difficulty, given min verbal prompting.  Patient participated in problem solving task using home safety picture cards. Patient identified problems with 100% accuracy independently. Provided solutions to problems with 80% accuracy independently, 90% accuracy given min verbal prompting.       SLP Treatment: Verbal Expression   Treatment Detail (SLP Verbal Expression) To improve expressive lang, patient participated in naming tasks. In a concrete divergent naming task, patient listed x12 holidays in 1 minute independently. Pt listed x11 states in 1 minute independently. Patient listed x4 countries in 1 minute, given max verbal cues. Pt demonstrated moderate word finding difficulties with this category. In an abstract convergent naming task, patient identified common characteristic among words with 70% accuracy independently, 90% accuracy given min verbal cues.        SLP Treatment: Auditory Comprehension   Treatment Detail (SLP Auditory Comprehension) Patient participated in auditory comprehension task of paragraph length material with 70% accy independently, 90% accuracy given min verbal cues.   Assessment   SLP Diagnosis mod cog-ling deficits   Prognosis Good   Problem List    Session Assessment progressing toward goals   Level of Motivation/Participation Good   Discharge Recommendations   Discharge Facility/Level Of Care Needs home health speech therapy   Plan   Plan Continue with current plan   Therapy Frequency Monday-Friday   SLP Follow-up   SLP Follow-up? Yes   Treatment/Billable Minutes   Speech Therapy Individual 45   Total Time 45       FIM " Scores  Comprehension:4  Expression: 3  Social Interaction:4  Problem Solving:3  Memory: 2     Comprehension:  Complex= humor, finances, rationale for medical treatment(hip precautions, pressure relief)  Basic= pain, hunger, thirst, bathroom needs, cold, nutrition, sleep     Understands basic 75-89%- may need words repeated (4)     Expression:  Expresses basic 50-74% of time - Needs to repeat parts of sentences  (3)     Social Interaction:  Interacts appropriately 75-89% of time - needs redirection for appropriate language or to initiate interaction  (4)     Problem Solving:  Solves basic problems 50-74% of the time  (3)     Memory:  Recognizes, recalls, or executes 25-49% of time 1 step of 2 step request  (2)     LEONEL Marcus-SLP  10/25/2017

## 2017-10-25 NOTE — PROGRESS NOTES
Physical Therapy   FIM Scores    Naty St   MRN: [id number]            10/25/17 1400   Transfers   Bed/Chair/WC 5   Toilet 5   Tub 4   Locomotion   Distance Walked 3   Distance Wheelchair 3   Walk 4   Wheelchair 5   Mode C   Stairs 4         Dianna Jostin, DYLON  10/25/2017        I certify that I was present in the room directing the student in service delivery and guided him/her using my skilled judgment. As the co-signing therapist, I have reviewed the student's documentation and am responsible for the treatment, assessment and plan.        Ruddy Sanchez, PT

## 2017-10-25 NOTE — TREATMENT PLAN
"Rehab Services' DME recommendations  DME to be delivered home unless otherwise specified.         [] NO DME NEEDED ________________________________________________________________________    [x]Walker:(can only select one of these)  [x]Adult (5'4"-6'6") []Carlos (4'4"-5'7")  [] Wide/ Heavy Duty         []Mendel       []  Rollator         [] knee walker   Accessories/Other:______   Wheels:(must complete) [x] Yes  [] No     []Crutches:  []Axillary []Loft strand    []Cane:              []Straight []Narrow based quad   []Wide based quad         [] Other:____________________________________________    [] Transfer Devices:   []Pawel Lift    []Slide Board ( [] with cut out  []26  []29)    ______________________________________________________________________    [x]Wheelchair: (please check)   Number of hours up in wheelchair per day:___     Style:  [] Standard [] Pediatric  [x] Light weight  []Reclining     []Amputee [] Mendel [] POV []Custom     Custom Vendor:________________________________________                                                      Seat Width: [x]18 (standard adult)    []16" (small adult)   []14(child)      [] 20  [] 22 [] 24         Seat Depth:   [x]16    []18  []20      Back Height:    [x]Standard      []Mendel          []Other     Leg Support: [x]Standard [x]Heel loops []Elevating leg rest    []Articulating [x]Swing Away     Arm Height:  []Detachable [x]Full   [] Desk  [x]Swing Away [] Adjustable Height       Lap Belt: []Velcro [x]Buckle                               Accessories: [x] Front brakes          [] Brake Extensions  [x]Anti-tippers                          []Safety Belt    Other:_______________________________________________     Cushion: [x]Basic []Foam []Gel  []Roho []Mikel I       Justification for Cushion(Must complete):_skin protection    For wheelchair order: (please choose all that apply)  - Caregiver is capable and willing to operate wheelchair safely. [x]  - Patients upper body " strength is sufficient for propulsion. []   - The patient has a cast, brace, or musculoskeletal condition which prevents 90 degree flexion of the knee. []  - The patient has significant edema of the lower extremities that requires elevating leg rests.  []  - A reclining back is ordered. []  - The patient requires the use of a wheel chair for ADLs within the home. []  - Patient mobility limitations cannot be sufficiently resolved by the use of other ambulatory therapies. [x]      []3 in 1 commode: []Standard     []Wide-Heavy Duty           []Drop arm                                      []Heavy Duty Drop Arm                              [x]Tub bench:  []Padded      [x](Unpadded=standard)     []Commode Opening                                          [] Heavy Duty    []Shower Chair: []With back   []Without back      []Hospital Bed: []Semi Electric    []Manual    []Electric   []Heavy Duty  []Extra Heavy Duty(>600#)  Patient requires a bed height different than a fixed height hospital bed to permit transfers to chair, wheel chair or standing. []Yes         []N/A     []Accessories: [] Gel Overlay Mattress     []Low Air Mattress   []Alternating Pressure Pad                              []Trapeze    [] Hip Kit:  [] Short Horn     [] Long Horn       []Other:_______________________________________________________________    [x]Home Health:  [x]OT [x]PT [x]SLP   [] MSW   [] Aide    []SN        []Outpatient:  []OT []PT []SLP [] MSW   [] CM      [x] Internal Referral      [] External Referral  _________________________________________________________________    Ceci Bryant 10/25/2017

## 2017-10-25 NOTE — PROGRESS NOTES
[facility]  Physical Medicine & Rehab  Progress Note    Patient Name: Naty St  MRN: [id number]  Patient Class: IP- Rehab   Admission Date: 10/11/2017  Length of Stay: 14 days  Attending Physician: Yordy Ponce MD  Primary Care Provider: Olvin Mars MD    Subjective:     Principal Problem: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    Interval History: Pt without new c/o's.  I have reviewed the HPI and PMFSH and there are no changes from admission.       Scheduled Medications:    amitriptyline  50 mg Oral QHS    amLODIPine  5 mg Oral Daily    atorvastatin  40 mg Oral Daily    benzocaine   Mouth/Throat BID    chlorhexidine  15 mL Mouth/Throat BID    doxycycline  100 mg Oral Q12H    dronabinol  5 mg Oral BID AC    erlotinib  150 mg Oral QHS    hydrocortisone   Topical BID    levETIRAcetam  750 mg Oral BID    lipase-protease-amylase 12,000-38,000-60,000 units  1 capsule Oral TID WM    magnesium oxide  400 mg Oral BID    metoprolol succinate  50 mg Oral Daily    rivaroxaban  20 mg Oral Daily with dinner    senna-docusate 8.6-50 mg  1 tablet Oral BID       PRN Medications: bisacodyl, influenza, LORazepam, magnesium hydroxide 400 mg/5 ml, ondansetron, pneumoc 13-marcus conj-dip cr(PF), promethazine, ramelteon    Review of Systems   Constitutional: Negative for chills, fatigue and fever.   HENT: Negative for trouble swallowing and voice change.    Eyes: Negative for photophobia and visual disturbance.   Respiratory: Negative for cough, shortness of breath and wheezing.    Cardiovascular: Negative for chest pain and palpitations.   Gastrointestinal: Negative for abdominal distention and nausea.   Genitourinary: Negative for difficulty urinating and flank pain.   Musculoskeletal: Positive for gait problem. Negative for arthralgias.   Skin: Negative for color change and rash.   Neurological: Positive for speech difficulty and weakness. Negative for  facial asymmetry, numbness and headaches.   Psychiatric/Behavioral: Negative for agitation and confusion.     Objective:     Vital Signs (Most Recent):  Temp: 97.9 °F (36.6 °C) (10/25/17 0622)  Pulse: 70 (10/25/17 0622)  Resp: 16 (10/25/17 0622)  BP: 130/69 (10/25/17 0622)  SpO2: 95 % (10/25/17 0622)    Vital Signs (24h Range):  Temp:  [97.9 °F (36.6 °C)-98.2 °F (36.8 °C)] 97.9 °F (36.6 °C)  Pulse:  [70-72] 70  Resp:  [16] 16  SpO2:  [94 %-95 %] 95 %  BP: (130-132)/(61-69) 130/69     Physical Exam   Constitutional: She appears well-developed and well-nourished.   HENT:   Head: Normocephalic and atraumatic.   Sores on superior palate   Eyes: EOM are normal. Pupils are equal, round, and reactive to light.   Neck: Normal range of motion.   Cardiovascular: Normal rate and regular rhythm.    Pulmonary/Chest: Effort normal. No respiratory distress.   Abdominal: Soft. There is no tenderness.   Musculoskeletal: Normal range of motion. She exhibits no deformity.   Neurological:   -  Mental Status:  AAOx3.  Follows commands.  Answers correct age and .  Recent and remote memory intact.  No neglect.    -  Speech and language:  - aphasia + dysarthria.    -  Coordination:  Finger to nose exam:  RUE mild dysmetria, LUE dysmetria.   -  Motor:  RUE: 4/5, 4/5 .  LUE: 5/5, 5/5 .  RLE: 5/5, DF 5/5, PF 5/5.  LLE: 5/5, DF 5/5, PF 5/5.   -  pronator drift. negative  -  Tone:  normal  -  Sensory:  Intact to light touch and pin prick.       Skin: Skin is warm and dry.   Two scabs noted with some redness over occiput area   Psychiatric: She has a normal mood and affect. Her behavior is normal.   Vitals reviewed.    NEUROLOGICAL EXAMINATION:     CRANIAL NERVES     CN III, IV, VI   Pupils are equal, round, and reactive to light.  Extraocular motions are normal.       Assessment/Plan:      Naty St is a 72 y.o. female admitted to inpatient rehabilitation on 10/11/2017 for Cerebral infarction due to embolism of right  cerebellar artery with impaired mobility and ADLs. Patient remains appropriate for PT, OT, and as required Speech therapy. Patient continues to require 24 hour nursing care as well as daily Physician assessment.    * 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    PT/OT/SLP      Transfers   Bed/Chair/WC 4   Toilet 4   Tub 4   Self Care   Eating 6   Grooming 5   Bathing 4   Dressing-Upper 5   Dressing-Lower 4   Toileting 4   Communication   Comprehension 4   Mode B   Expression 3   Mode B   Social Cognition   Social Interaction 4   Problem Solving 3   Memory 2             Folliculitis    Miconazole ordered BID    10/24: dc Micondazole cream. Switched to Doxycycline PO for 5 days.         Ataxia    Likely 2/2 stroke. Continue working with PT/OT        CKD (chronic kidney disease) stage 3, GFR 30-59 ml/min    Uptrending. Will continue to monitor. Encouraged PO fluids.        Hypomagnesemia    1.7 today. Continue Mag Oxide 400mg BID. Continue to monitor        Essential hypertension    Controlled. Continue meds.            DISCHARGE PLANNING:  Tentative Discharge Date: 10/26/2017    Future Appointments  Date Time Provider Department Center   11/9/2017 9:30 AM [facility] PET CT LIMIT 500 LBS [facility] PET CT [facility]   11/9/2017 2:00 PM Pablo Lr MD [facility] STROKE Reece ruben   11/10/2017 9:30 AM LAB, HEMONC CANCER BLDG [facility] HO Daniel Dillard   11/10/2017 10:30 AM Karrie Vazquez MD [facility] HEM ONC Daniel [facility]   11/10/2017 11:30 AM INJECTION, [facility] INFUSION [facility] CHEMO Daniel Dillard   12/5/2017 1:30 PM [facility] MRI2 [facility] MRI [facility]       Yordy Ponce MD  Department of Physical Medicine & Rehab   [facility]

## 2017-10-25 NOTE — SUBJECTIVE & OBJECTIVE
Interval History: Pt without new c/o's.  I have reviewed the HPI and PMFSH and there are no changes from admission.       Scheduled Medications:    amitriptyline  50 mg Oral QHS    amLODIPine  5 mg Oral Daily    atorvastatin  40 mg Oral Daily    benzocaine   Mouth/Throat BID    chlorhexidine  15 mL Mouth/Throat BID    doxycycline  100 mg Oral Q12H    dronabinol  5 mg Oral BID AC    erlotinib  150 mg Oral QHS    hydrocortisone   Topical BID    levETIRAcetam  750 mg Oral BID    lipase-protease-amylase 12,000-38,000-60,000 units  1 capsule Oral TID WM    magnesium oxide  400 mg Oral BID    metoprolol succinate  50 mg Oral Daily    rivaroxaban  20 mg Oral Daily with dinner    senna-docusate 8.6-50 mg  1 tablet Oral BID       PRN Medications: bisacodyl, influenza, LORazepam, magnesium hydroxide 400 mg/5 ml, ondansetron, pneumoc 13-marcus conj-dip cr(PF), promethazine, ramelteon    Review of Systems   Constitutional: Negative for chills, fatigue and fever.   HENT: Negative for trouble swallowing and voice change.    Eyes: Negative for photophobia and visual disturbance.   Respiratory: Negative for cough, shortness of breath and wheezing.    Cardiovascular: Negative for chest pain and palpitations.   Gastrointestinal: Negative for abdominal distention and nausea.   Genitourinary: Negative for difficulty urinating and flank pain.   Musculoskeletal: Positive for gait problem. Negative for arthralgias.   Skin: Negative for color change and rash.   Neurological: Positive for speech difficulty and weakness. Negative for facial asymmetry, numbness and headaches.   Psychiatric/Behavioral: Negative for agitation and confusion.     Objective:     Vital Signs (Most Recent):  Temp: 97.9 °F (36.6 °C) (10/25/17 0622)  Pulse: 70 (10/25/17 0622)  Resp: 16 (10/25/17 0622)  BP: 130/69 (10/25/17 0622)  SpO2: 95 % (10/25/17 0622)    Vital Signs (24h Range):  Temp:  [97.9 °F (36.6 °C)-98.2 °F (36.8 °C)] 97.9 °F (36.6 °C)  Pulse:   [70-72] 70  Resp:  [16] 16  SpO2:  [94 %-95 %] 95 %  BP: (130-132)/(61-69) 130/69     Physical Exam   Constitutional: She appears well-developed and well-nourished.   HENT:   Head: Normocephalic and atraumatic.   Sores on superior palate   Eyes: EOM are normal. Pupils are equal, round, and reactive to light.   Neck: Normal range of motion.   Cardiovascular: Normal rate and regular rhythm.    Pulmonary/Chest: Effort normal. No respiratory distress.   Abdominal: Soft. There is no tenderness.   Musculoskeletal: Normal range of motion. She exhibits no deformity.   Neurological:   -  Mental Status:  AAOx3.  Follows commands.  Answers correct age and .  Recent and remote memory intact.  No neglect.    -  Speech and language:  - aphasia + dysarthria.    -  Coordination:  Finger to nose exam:  RUE mild dysmetria, LUE dysmetria.   -  Motor:  RUE: 4/5, 4/5 .  LUE: 5/5, 5/5 .  RLE: 5/5, DF 5/5, PF 5/5.  LLE: 5/5, DF 5/5, PF 5/5.   -  pronator drift. negative  -  Tone:  normal  -  Sensory:  Intact to light touch and pin prick.       Skin: Skin is warm and dry.   Two scabs noted with some redness over occiput area   Psychiatric: She has a normal mood and affect. Her behavior is normal.   Vitals reviewed.    NEUROLOGICAL EXAMINATION:     CRANIAL NERVES     CN III, IV, VI   Pupils are equal, round, and reactive to light.  Extraocular motions are normal.

## 2017-10-25 NOTE — ASSESSMENT & PLAN NOTE
PT/OT/SLP      Transfers   Bed/Chair/WC 4   Toilet 4   Tub 4   Self Care   Eating 6   Grooming 5   Bathing 4   Dressing-Upper 5   Dressing-Lower 4   Toileting 4   Communication   Comprehension 4   Mode B   Expression 3   Mode B   Social Cognition   Social Interaction 4   Problem Solving 3   Memory 2

## 2017-10-25 NOTE — NURSING
Patient had an episode of nose bleed this am around 0820, small amount of blood from both nostrils while brushing teeth.  Stopped with pressure to nostrils.  No further bleeding noted.

## 2017-10-25 NOTE — NURSING
Patient was made aware that she was receiving her last tarceva pill tonight. She stated that she was aware and was working on getting more pills tomorrow.

## 2017-10-25 NOTE — PROGRESS NOTES
Discharge discussed with pt's .  Discharge plan is home with spouse and home health.  Pt will be transported home by spouse.  SW following to facilitate obtaining DME and home health care.

## 2017-10-25 NOTE — PLAN OF CARE
Patient Plan of Care  Problem: Falls Prevention  Goal: Absence of Falls  Outcome: Pt remains free from falls this shift.  Continuing to monitor.  Goal Met     Problem: Acute Pain  Goal: No pain during this shift  Outcome: Patient did not complain of pain during this shift

## 2017-10-25 NOTE — PROGRESS NOTES
Occupational Therapy   FIM Scores    Naty St   MRN: [id number]      10/25/17 0730   Transfers   Bed/Chair/WC 4   Toilet 4   Tub 4   Self Care   Eating 6   Grooming 5   Bathing 4   Dressing-Upper 5   Dressing-Lower 4   Toileting 4   Communication   Comprehension 4   Mode B   Expression 3   Mode B   Social Cognition   Social Interaction 4   Problem Solving 3   Memory 2     LEOBARDO Bustillos   10/25/2017

## 2017-10-25 NOTE — PROGRESS NOTES
"Physical Therapy   Treatment/Discharge Visit    Naty St   MRN: [id number]          10/25/17 1400   PT Time Calculation   PT Start Time 1400   PT Stop Time 1446   PT Total Time (min) 46 min   Treatment   Treatment Type Treatment;Other (see comments)  (Discharge Visit)   PT/PTA PT   General   PT Received On 10/25/17   Family/Caregiver Present No   Patient Found (position) Seated in wheelchair  (in room)   Pt found with (posey belt in place)   Precautions   General Precautions fall   Visual/Auditory Vision impaired  (wears glasses)   Subjective   Patient states "I'm going to get right in bed when I go home"   Pain/Comfort   Pain Rating 1 no pain   Pain Rating Post-Intervention 1 no pain   Bed Mobility   Bed Mobility yes   Rolling/Turning to Left Modified independent   Rolling/Turning Right Modified independent   Scooting/Bridging Modified Independent   Supine to Sit Modified Independent   Sit to Supine Modified Independent   Transfers   Transfer yes   Sit to Stand   Sit <> Stand Assistance Contact Guard Assistance;Stand By Assistance   Sit <> Stand Assistive Device No Assistive Device   Trials/Comments Pt performed multiple trials; occasional CGA for steadying   Stand to Sit   Assistance Contact Guard Assistance;Stand By Assistance   Assistive Device No Assistive Device   Trials/Comments multiple trials~ occasional CGA for steadying   Chair to Mat   Chair<> Mat Technique Stand Pivot   Chair<>Mat Assistance Stand By Assistance;Contact Guard Assistance   Chair <> Mat Assistive Device No Assistive Device   Trials/Comments Pt performed 2 trials with SBA    Mat to Chair   Technique Stand Pivot   Assistance Stand By Assistance;Contact Guard Assistance   Assistive Device No Assistive Device   Trials/Comments Pt performed 2 trials with SBA   Wheelchair Activities   Propulsion No   Gait   Gait Yes   Weight Bearing Status full   Gait 1   Surface 1 Level tile   Gait Assistive Device Rolling walker   Other Apparatus 1 " "Other (Comment)  (gait belt)   Assistance 1 Contact Guard Assistance   Gait Distance Pt ambulated 285 ft w/ RW + CGA and requested seated rest break in order to pull up pants/wipe nose.  Pt then ambulated ~380 ft w/ RW + CGA w/o any episodes of LOB.   Gait Pattern swing-through gait   Gait Deviation(s) decreased leila;increased time in double stance;decreased velocity of limb motion;decreased step length;decreased stride length;lateral lean;forward lean  (R lateral lean)   Impairments Contributing to Gait Deviations impaired balance;impaired coordination;impaired postural control;impaired motor control;decreased strength   Stairs   Stairs Yes   Stairs/Curb Step   Rails 1 Right;Left  (pt holds with both hands)   Device 1 No device   Other Apparatus 1 Other (Comment)  (gait belt)   Assistance 1 Contact guard   Comment/# Steps 1 pt ascends/descends 12 steps w/ CGA and reciprocal gait pattern for ascending and descending.   Stairs/ Curb Step  2   Rails 2 None (Comment)   Device 2 Rolling walker   Other Apparatus 2 Other (Comment)  (gait belt)   Assistance 2 Contact Guard Assistance   Comment/# Steps 2 Pt ascends/descends 4" curb w/ RW + CGA.   Balance   Balance Yes   Dynamic Standing Balance   Dynamic Standing-Balance Support Left upper extremity supported   Dynamic Standing-Balance Reaching for objects;Reaching across midline   Dynamic Standing-Comments Pt stands for ~5 min w/ CGA/SBA + L UE resting on table while using R hand to place/take out pegs   Other Activities   Other Activities Other (Comment)   Comments Pt performed 2 trials of 3-in-1 commode transfer w/ SBA   Activity Tolerance   Activity Tolerance Patient tolerated treatment well   Other Comments   Comments Pt was educated on the importance of performing mobility at home w/ /son at her side. Pt expressed understanding.   After Treatment   Patient Position After Treatment Seated in wheelchair  (posey belt in place)   Patient after treatment left " call button in reach   Assessment   Prognosis Good   Problem List Decreased strength;Impaired balance;Decreased endurance;Decreased mobility;Decreased coordination;Decreased cognition;Impaired judgement;Decreased safety awareness   Session Assessment progressing toward goals   Assessment Pt did well today, and required less cueing for all mobility tasks.  Pt still exhibits impulsive behavior and requires cueing to slow down movements to ensure safety.  Pt was able to complete 2 transfers to mat and commode w/ stand by assistance, and will require supervision/light assistance for transfers once discharged.  Pt continues to demonstrate decreased balance, decreased postural control, and decreased safety awareness.  With the help from  and her son, the pt is safe for discharge home with appropriate DME.   Level of Motivation/Participation good   Barriers to Discharge None   Barriers to Discharge Comments Pt has good family support   Long Term Goals   Transfer Sit to stand - Met/Not Met Met   Transfer Bed/Chair - Met/Not Met Met   Ambulate - Met/Not Met Met   Go Up/Down Stairs - Met/Not Met Met   Go Up/Down Curb- Met/Not Met Met   Stand - Met/Not Met Not Met   Tolerate Exercise - Met/Not Met Met   Propel Wheelchair - Met/Not Met Met   Other Goal Met   Other Goal 2 Met   Other Goal 3 Not Met~ family not present for training   Discharge Recommendations   Equipment Needed After Discharge bath bench;walker, rolling;wheelchair   Discharge Facility/Level Of Care Needs outpatient PT   Plan   Therapy Frequency (Pt is being discharged home.)   Physical Therapy Follow-up   PT Follow-up? No   Treatment/Billable Minutes   Gait training 16   Therapeutic Activity 30   Total Time 46         Dianna Frankel, SPT  10/25/2017        I certify that I was present in the room directing the student in service delivery and guided him/her using my skilled judgment. As the co-signing therapist, I have reviewed the student's documentation  and am responsible for the treatment, assessment and plan.        Ruddy Sanchez, PT

## 2017-10-25 NOTE — PROGRESS NOTES
Recreational Therapy   Treatment    Naty St  MRN: [id number]      4:15-4:57 pm      Subjective: Hi, Jeronimo  Objective:     yoga/relaxation class    Pt was  seen for relaxation classed today, Pt tolerated the session well, no pain complaints voiced, after session pt stated that she enjoyed herself.    Plan: continue RT session to address goals        Stacey Pandey, CTRS,   10/25/2017

## 2017-10-25 NOTE — PROGRESS NOTES
Occupational Therapy   AM Treatment/Discharge Summary    Naty St   MRN: [id number]    10/25/17 0730   OT Time Calculation   OT Start Time 0730   OT Stop Time 0815   OT Total Time (min) 45 min   Pain/Comfort   Pain Rating 1/10   Pain Comment soreness in fingers from small cuts   Bed Mobility   Rolling/Turning to Left Independent   Rolling/Turning Right Independent   Supine to Sit Modified Independent   Sit to Supine Modified Independent   Transfers   Transfer yes   Sit to Stand   Sit <> Stand Assistance Contact Guard Assistance   Sit <> Stand Assistive Device Rolling Walker   Trials/Comments cues for hand placement, pt attempts to pull up on walker  (posterior lean)   Stand to Sit   Assistance Contact Guard Assistance   Assistive Device Rolling Walker   Bed to Chair   Bed <> Chair Technique Stand Pivot   Bed <> Chair Transfer Assistance Minimum Assistance   Bed <> Chair Assistive Device Rolling Walker   Tub Bench Transfer   Tub Transfer Technique Squat Pivot   Tub Bench Transfer Assistance Minimum Assistance   Tub Transfer Assistive Device Tub transfer bench   Toilet Transfer   Toilet Transfer Technique Stand Pivot   Toilet Transfer Assistance Minimum Assistance   Toilet Transfer Assistive Device Rolling Walker   Feeding   Feeding Level of Assistance Modified independent   Feeding Where Assessed Wheelchair   Grooming   Grooming Level of Assistance Supervision   Grooming Where Assessed Wheelchair;Sitting sinkside   Bathing   Bathing Level of Assistance Contact guard   Bathing Where Assessed Shower;tub bench   Bathing Comments pt bathes all body parts with steadying and use of grab bar, cues for safety during sit-stand   UE Dressing   UE Dressing Level of Assistance Supervision   UE Dressing Where Assessed Wheelchair   LE Dressing   LE Dressing  Level of Assistance Contact guard   LE Dressing Level of Assistance Contact guard   Sock Level of Assistance Supervision   LE Dressing Where Assessed (seated on  TTB)   Toileting   Toileting Level of Assistance Contact guard   Toileting Where Assessed Toilet   Toileting Comments steadying assist and cues for safety awareness   Additional Activities   Additional Activities Comments Cognition:   Comprehension:4  Expression: 3  Social Interaction:4  Problem Solving:3  Memory: 2     Comprehension:  Complex= humor, finances, rationale for medical treatment(hip precautions, pressure relief)  Basic= pain, hunger, thirst, bathroom needs, cold, nutrition, sleep     Understands basic 75-89%- may need words repeated (4)     Expression:  Expresses basic 50-74% of time - Needs to repeat parts of sentences  (3)     Social Interaction:  Interacts appropriately 75-89% of time - needs redirection for appropriate language or to initiate interaction  (4)     Problem Solving:  Solves basic problems 50-74% of the time  (3)     Memory:  Recognizes, recalls, or executes 25-49% of time 1 step of 2 step request  (2)    Activity Tolerance   Activity Tolerance Patient tolerated treatment well   Medical Staff Made Aware NSG   After Treatment   Patient Position After Treatment Seated in wheelchair   Patient after treatment left call button in reach   Assessment   Prognosis Fair   Problem List Decreased Self Care skills;Decreased upper extremity strength;Decreased safe judgment during ADL;Decreased endurance;Decreased functional mobility;Decreased IADLs;Decreased trunk control for functional activities;Decreased Function of right upper extremity;Decreased gross motor control;Decreased fine motor control;Decreased sensation;Decreased cognition   Assessment Pt has met 6/9 LTG, and has made progress during rehab stay but remains with poor safety awareness and balance impairments which put her at a high risk for falls. Pt will require 24 hr S at discharge as she has shown poor carryover of safety techniques with RW and transfers and needs instructions repeated multiple times in sessions. Pt is appropriate  for discharge home with family supervision and OP OT (pending transportation availability) to address remaining deficits.    Level of Motivation/Participation good   Long Term Goals   Time For Goal Achievement 2 weeks   Pt Will Transfer Bed/Chair With RW;With contact guard assistance   Transfer Bed/Chair - Met/Not Met Met   Pt Will Transfer To Bedside Commode With contact guard assist;With RW   Transfer Bedside Commode -Met/Not Met Not Met   Pt Will Transfer To Shower With RW;With contact guard assist   Transfer to Shower-Met/Not Met Met   Pt Will Perform Eating With modified independence;New goal   Eating - Met/Not Met Met   Pt Will Perform Grooming Independently   Grooming - Met/Not Met Not Met   Pt Will Perform Bathing With contact guard assistance;New goal   Bathing - Met/Not Met Met   Pt Will Perform UE Dressing With modified independence;New goal   UE Dressing - Met/Not Met Not Met   Pt Will Perform LE Dressing With contact guard assistance;New goal   LE Dressing - Met/Not Met Met   Pt Will Perform Toileting With contact guard assistance;New goal   Toileting-Met/Not Met Met   Discharge Recommendations   Equipment Needed After Discharge walker, rolling;wheelchair   Discharge Facility/Level Of Care Needs (OP OT if transportation is available)   Plan   Plan Continue with current plan   Therapy Frequency 2 times/day;Monday-Friday;Saturday or Sunday   Occupational Therapy Follow-up   OT Follow-up? Yes   Treatment/Billable Minutes   Self Care/Home Management 45   Total Time 45     LEOBARDO Bustillos   10/25/2017

## 2017-10-26 ENCOUNTER — TELEPHONE (OUTPATIENT)
Dept: PHARMACY | Facility: CLINIC | Age: 72
End: 2017-10-26

## 2017-10-26 VITALS
DIASTOLIC BLOOD PRESSURE: 58 MMHG | HEIGHT: 65 IN | BODY MASS INDEX: 21.43 KG/M2 | RESPIRATION RATE: 16 BRPM | WEIGHT: 128.63 LBS | SYSTOLIC BLOOD PRESSURE: 116 MMHG | HEART RATE: 62 BPM | OXYGEN SATURATION: 96 % | TEMPERATURE: 98 F

## 2017-10-26 LAB
ANION GAP SERPL CALC-SCNC: 4 MMOL/L
BASOPHILS # BLD AUTO: 0.02 K/UL
BASOPHILS NFR BLD: 0.3 %
BUN SERPL-MCNC: 21 MG/DL
CALCIUM SERPL-MCNC: 8.2 MG/DL
CHLORIDE SERPL-SCNC: 115 MMOL/L
CO2 SERPL-SCNC: 23 MMOL/L
CREAT SERPL-MCNC: 1.5 MG/DL
DIFFERENTIAL METHOD: ABNORMAL
EOSINOPHIL # BLD AUTO: 0.3 K/UL
EOSINOPHIL NFR BLD: 4.7 %
ERYTHROCYTE [DISTWIDTH] IN BLOOD BY AUTOMATED COUNT: 13.2 %
EST. GFR  (AFRICAN AMERICAN): 39.6 ML/MIN/1.73 M^2
EST. GFR  (NON AFRICAN AMERICAN): 34.3 ML/MIN/1.73 M^2
GLUCOSE SERPL-MCNC: 87 MG/DL
HCT VFR BLD AUTO: 31.4 %
HGB BLD-MCNC: 9.4 G/DL
LYMPHOCYTES # BLD AUTO: 1.5 K/UL
LYMPHOCYTES NFR BLD: 26 %
MAGNESIUM SERPL-MCNC: 1.5 MG/DL
MCH RBC QN AUTO: 28.4 PG
MCHC RBC AUTO-ENTMCNC: 29.9 G/DL
MCV RBC AUTO: 95 FL
MONOCYTES # BLD AUTO: 0.6 K/UL
MONOCYTES NFR BLD: 11 %
NEUTROPHILS # BLD AUTO: 3.3 K/UL
NEUTROPHILS NFR BLD: 58 %
PLATELET # BLD AUTO: 297 K/UL
PMV BLD AUTO: 9.9 FL
POTASSIUM SERPL-SCNC: 4.1 MMOL/L
RBC # BLD AUTO: 3.31 M/UL
SODIUM SERPL-SCNC: 142 MMOL/L
WBC # BLD AUTO: 5.73 K/UL

## 2017-10-26 PROCEDURE — G0009 ADMIN PNEUMOCOCCAL VACCINE: HCPCS | Performed by: PHYSICAL MEDICINE & REHABILITATION

## 2017-10-26 PROCEDURE — 25000003 PHARM REV CODE 250: Performed by: PHYSICAL MEDICINE & REHABILITATION

## 2017-10-26 PROCEDURE — 90471 IMMUNIZATION ADMIN: CPT | Performed by: PHYSICAL MEDICINE & REHABILITATION

## 2017-10-26 PROCEDURE — 90472 IMMUNIZATION ADMIN EACH ADD: CPT | Performed by: PHYSICAL MEDICINE & REHABILITATION

## 2017-10-26 PROCEDURE — 90662 IIV NO PRSV INCREASED AG IM: CPT | Performed by: PHYSICAL MEDICINE & REHABILITATION

## 2017-10-26 PROCEDURE — 63600175 PHARM REV CODE 636 W HCPCS: Performed by: PHYSICAL MEDICINE & REHABILITATION

## 2017-10-26 PROCEDURE — 83735 ASSAY OF MAGNESIUM: CPT

## 2017-10-26 PROCEDURE — G0008 ADMIN INFLUENZA VIRUS VAC: HCPCS | Performed by: PHYSICAL MEDICINE & REHABILITATION

## 2017-10-26 PROCEDURE — 80048 BASIC METABOLIC PNL TOTAL CA: CPT

## 2017-10-26 PROCEDURE — 97803 MED NUTRITION INDIV SUBSEQ: CPT | Performed by: NUTRITIONIST

## 2017-10-26 PROCEDURE — 90670 PCV13 VACCINE IM: CPT | Performed by: PHYSICAL MEDICINE & REHABILITATION

## 2017-10-26 PROCEDURE — 85025 COMPLETE CBC W/AUTO DIFF WBC: CPT

## 2017-10-26 PROCEDURE — 25000003 PHARM REV CODE 250: Performed by: STUDENT IN AN ORGANIZED HEALTH CARE EDUCATION/TRAINING PROGRAM

## 2017-10-26 PROCEDURE — 36415 COLL VENOUS BLD VENIPUNCTURE: CPT

## 2017-10-26 PROCEDURE — 99232 SBSQ HOSP IP/OBS MODERATE 35: CPT | Mod: ,,, | Performed by: PHYSICAL MEDICINE & REHABILITATION

## 2017-10-26 RX ORDER — ATORVASTATIN CALCIUM 40 MG/1
40 TABLET, FILM COATED ORAL DAILY
Qty: 90 TABLET | Refills: 3 | Status: SHIPPED | OUTPATIENT
Start: 2017-10-26 | End: 2018-11-02 | Stop reason: SDUPTHER

## 2017-10-26 RX ADMIN — Medication 400 MG: at 07:10

## 2017-10-26 RX ADMIN — PNEUMOCOCCAL 13-VALENT CONJUGATE VACCINE 0.5 ML: 2.2; 2.2; 2.2; 2.2; 2.2; 4.4; 2.2; 2.2; 2.2; 2.2; 2.2; 2.2; 2.2 INJECTION, SUSPENSION INTRAMUSCULAR at 01:10

## 2017-10-26 RX ADMIN — PANCRELIPASE 1 CAPSULE: 60000; 12000; 38000 CAPSULE, DELAYED RELEASE PELLETS ORAL at 07:10

## 2017-10-26 RX ADMIN — DOXYCYCLINE HYCLATE 100 MG: 100 TABLET, COATED ORAL at 07:10

## 2017-10-26 RX ADMIN — ATORVASTATIN CALCIUM 40 MG: 20 TABLET, FILM COATED ORAL at 07:10

## 2017-10-26 RX ADMIN — LEVETIRACETAM 750 MG: 750 TABLET ORAL at 07:10

## 2017-10-26 RX ADMIN — AMLODIPINE BESYLATE 5 MG: 5 TABLET ORAL at 07:10

## 2017-10-26 RX ADMIN — INFLUENZA A VIRUSA/MICHIGAN/45/2015 X-275 (H1N1) ANTIGEN (FORMALDEHYDE INACTIVATED), INFLUENZA A VIRUS A/HONG KONG/4801/2014 X-263B (H3N2) ANTIGEN (FORMALDEHYDE INACTIVATED), AND INFLUENZA B VIRUS B/BRISBANE/60/2008 ANTIGEN (FORMALDEHYDE INACTIVATED) 0.5 ML: 60; 60; 60 INJECTION, SUSPENSION INTRAMUSCULAR at 01:10

## 2017-10-26 RX ADMIN — METOPROLOL SUCCINATE 50 MG: 50 TABLET, EXTENDED RELEASE ORAL at 07:10

## 2017-10-26 RX ADMIN — PANCRELIPASE 1 CAPSULE: 60000; 12000; 38000 CAPSULE, DELAYED RELEASE PELLETS ORAL at 12:10

## 2017-10-26 RX ADMIN — DRONABINOL 5 MG: 2.5 CAPSULE ORAL at 07:10

## 2017-10-26 NOTE — PROGRESS NOTES
[facility]  Adult Nutrition  Progress Note     SUMMARY      Recommendations     Recommendation/Intervention:   1.  Continue Rx diet + supplment   2.  Monitor PO intake   3.  RD to follow     Goals: PO to meet 85% of EEN/EPN  Nutrition Goal Status: ongoing   Communication of RD Recs: discussed on rounds     Continuum of Care Plan           Reason for Assessment     Reason for Assessment: RD follow up  Diagnosis:  (CVA)  Relevent Medical History: Lung cancer with current chemo therapy, HTN, seizures, CKD 3   Interdisciplinary Rounds: did not attend     General Information Comments: pt using Marinol at home brought to MD's attention     Nutrition Discharge Planning: DC on Regular diet     Nutrition Prescription Ordered     Current Diet Order: Regular              Oral Nutrition Supplement: Boost plus vanilla      Evaluation of Received Nutrients/Fluid Intake           Energy Calories Required:  meeting needs           Protein Required:  meeting needs              Fluid Required:t meeting needs      Comments: pt has good PO intake consuming 100% of meals     % Intake of Estimated Energy Needs:  %  % Meal Intake: 100%      Nutrition Risk Screen     Nutrition Risk Screen: no indicators present     Nutrition/Diet History     Patient Reported Diet/Restrictions/Preferences: general  Typical Food/Fluid Intake: good po prior to admit, po currently averaging 100%  Food Preferences: No cultural or Lutheran food preferences        Factors Affecting Nutritional Intake: depression, impaired cognitive status/motor control                 Labs/Tests/Procedures/Meds        Pertinent Labs Reviewed: reviewed, pertinent     Pertinent Medications Reviewed: reviewed, pertinent  Pertinent Medications Comments: Mg, statin, pancreatic enzymes, senna-docusate, dronabinol     Physical Findings     Overall Physical Appearance: weak  Tubes:  (-)  Oral/Mouth Cavity: dental applicance present (specify), tooth/teeth  "missing  Skin: intact     Anthropometrics     Height: 5' 5" (165.1 cm)  Weight Method: Standard Scale  Weight: 64.9 kg (143 lb 1.3 oz)     Ideal Body Weight (IBW), Female: 125 lb     % Ideal Body Weight, Female (lb): 114.46 lb  BMI (Calculated): 23.9  BMI Grade: 18.5-24.9 - normal     Estimated/Assessed Needs     Weight Used For Calorie Calculations: 64 kg (141 lb 1.5 oz)    Energy Calorie Requirements (kcal): 8776-3474 (25-30 kcal/kg)     Weight Used For Protein Calculations: 64 kg (141 lb 1.5 oz)  Protein Requirements: 63-83g (1.0-1.3g/kg)     Fluid Requirements (mL): 1600  Fluid Need Method: RDA Method     Assessment and Plan     Monitor and Evaluation  Food and Nutrient Intake: food and beverage intake  Food and Nutrient Adminstration: diet order  Physical Activity and Function: nutrition-related ADLs and IADLs  Anthropometric Measurements: weight change  Biochemical Data, Medical Tests and Procedures: gastrointestinal profile, electrolyte and renal panel, inflammatory profile  Nutrition-Focused Physical Findings: overall appearance    Nutrition Risk  Level of Risk:  (follow two times per month)    Nutrition Follow-Up  RD Follow-up?: Yes    "

## 2017-10-26 NOTE — DISCHARGE INSTRUCTIONS
[facility] [phone] you will be contacted to schedule initial visit.  If you have not received a call the day after discharge please call to schedule a visit.    [facility] [phone] supplied your wheelchair and rolling walker.  If you have questions or concerns about your equipment please call the number provided.

## 2017-10-26 NOTE — PLAN OF CARE
Problem: Patient Care Overview  Goal: Plan of Care Review  Outcome: Ongoing (interventions implemented as appropriate)  Patient's plan of care reviewed    Problem: Stroke (Ischemic) (Adult)  Goal: Signs and Symptoms of Listed Potential Problems Will be Absent, Minimized or Managed (Stroke)  Signs and symptoms of listed potential problems will be absent, minimized or managed by discharge/transition of care (reference Stroke (Ischemic) (Adult) CPG).   Outcome: Ongoing (interventions implemented as appropriate)  Patient has not had stroke this shift    Problem: Fall Risk (Adult)  Goal: Absence of Falls  Patient will demonstrate the desired outcomes by discharge/transition of care.   Outcome: Ongoing (interventions implemented as appropriate)  Patient has not had a fall this shift

## 2017-10-27 NOTE — DISCHARGE SUMMARY
[facility]  Physical Medicine & Rehab  Discharge Summary    Patient Name: Naty St  MRN: [id number]  Admission Date: 10/11/2017  Hospital Length of Stay: 15 days  Discharge Date and Time: 10/26/2017  1:45 PM  Attending Physician: No att. providers found  Discharging Provider: Yordy Ponce MD  Primary Care Physician: Olvin Mars MD    HPI: Naty St is a 72-year-old female with PMHx of Left frontal meningioma (s/p craniotomy 1/2016), DVT, Breast CA (1994), HTN, pacreatic CA (1998), serizures, and psychiatric disorder .  Patient presented to [facility] on 10/8 with speech difficulties, blurry vision, vomiting, and vertigo.  CTH revealed no acute pathology. She did not receive tPA 2/2 outside treatment window.  MRI revealed possible R MCA acute/recent infarct; however, repeat MRI does not reveal R MCA acute infarct.  MRA head/neck revealed probable occlusion of the R superior cerebral artery. Patient tolerated rehab and progressed towards functional goals.    * No surgery found *     Indwelling Lines/Drains at time of discharge:   Lines/Drains/Airways          No matching active lines, drains, or airways          Hospital Course:   * 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     PT/OT/SLP       Ataxia     Likely 2/2 stroke. Continue working with PT/OT with Home Health       CKD (chronic kidney disease) stage 3, GFR 30-59 ml/min     Currently stable       Hypomagnesemia     Continue Mag Oxide 400mg BID       Essential hypertension     Controlled. Continue meds.         Consults         Status Ordering Provider     Inpatient consult to Dietary  Once     Provider:  (Not yet assigned)    Completed YORDY PONCE          Significant Diagnostic Studies: Labs:   BMP:   Recent Labs  Lab 10/26/17  0633   GLU 87      K 4.1   *   CO2 23   BUN 21   CREATININE 1.5*   CALCIUM 8.2*   MG 1.5*    and CBC   Recent Labs  Lab 10/26/17  0633   WBC 5.73   HGB 9.4*  "  HCT 31.4*          Final Active Diagnoses:    Diagnosis Date Noted POA    PRINCIPAL PROBLEM:  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 [I63.441] 10/08/2017 Yes    Folliculitis [L73.9] 10/15/2017 No    Stomatitis [K12.1] 10/15/2017 No    Ataxia [R27.0] 10/10/2017 Yes    CKD (chronic kidney disease) stage 3, GFR 30-59 ml/min [N18.3] 04/07/2017 Yes    Hypomagnesemia [E83.42] 02/13/2017 Yes    Essential hypertension [I10] 08/25/2014 Yes      Problems Resolved During this Admission:    Diagnosis Date Noted Date Resolved POA       Discharged Condition: good    Disposition: Home or Self Care    Follow Up:  Follow-up Information     Olvin Mars MD In 2 weeks.    Specialty:  Internal Medicine  Contact information:  [address]  [facility] [id number] [phone]             Karrie Vazquez MD In 2 weeks.    Specialties:  Hematology and Oncology, Hematology  Contact information:  [address] [facility] [id number] [phone]                 Patient Instructions:     WALKER FOR HOME USE   Order Specific Question Answer Comments   Type of Walker: Adult (5'4"-6'6")    With wheels? Yes    Height: 5' 5" (1.651 m)    Weight: 58.3 kg (128 lb 9.6 oz)    Length of need (1-99 months): 99    Does patient have medical equipment at home? bedside commode pt reports she may have tub bench but not sure where it is / pt reports she may have tub bench but not sure where it is / pt reports she may have tub bench but not sure where it is   Does patient have medical equipment at home? cane, straight    Does patient have medical equipment at home? other (see comments)    Please check all that apply: Patient's condition impairs ambulation.    Please check all that apply: Patient is unable to safely ambulate without equipment.    Please check all that apply: Patient needs help to get in and out of chair.    Vendor: [facility]    Expected Date of Delivery: 10/26/2017      WHEELCHAIR FOR HOME " "USE   Order Specific Question Answer Comments   Hours in W/C per day: 8    Type of Wheelchair: Lightweight    Patient unable to propel in Standard wheelchair? Yes    Size(Width): 18"(STD adult)    Leg Support: STD footrests    Leg Support: Swing Away    Arm Height: Full length    Arm Height: Swing away    Desired seat depth: 16"    Back height: standard    Lap Belt: Buckle    Accessories: Heel loops    Accessories: Front brakes    Accessories: Anti-tippers    Cushion: Basic    Justification for cushion: Prevent pressure ulcers skin protection   Height: 5' 5" (1.651 m)    Weight: 58.3 kg (128 lb 9.6 oz)    Does patient have medical equipment at home? bedside commode pt reports she may have tub bench but not sure where it is / pt reports she may have tub bench but not sure where it is / pt reports she may have tub bench but not sure where it is   Does patient have medical equipment at home? cane, straight    Does patient have medical equipment at home? other (see comments)    Length of need (1-99 months): 99    Please check all that apply: Caregiver is capable and willing to operate wheelchair safely.    Please check all that apply: Patient mobility limitations cannot be sufficiently resolved by the use of other ambulatory therapies.    Vendor: [facility]    Expected Date of Delivery: 10/26/2017      TRANSFER TUB BENCH FOR HOME USE   Order Specific Question Answer Comments   Type of Transfer Tub Bench: Unpadded    Height: 5' 5" (1.651 m)    Weight: 58.3 kg (128 lb 9.6 oz)    Does patient have medical equipment at home? bedside commode pt reports she may have tub bench but not sure where it is / pt reports she may have tub bench but not sure where it is / pt reports she may have tub bench but not sure where it is   Does patient have medical equipment at home? cane, straight    Does patient have medical equipment at home? other (see comments)    Length of need (1-99 months): 99    Vendor: [facility]    Expected Date " of Delivery: 10/26/2017      Referral to Home health   Referral Priority: Routine Referral Type: Home Health Care   Referral Reason: Specialty Services Required    Requested Specialty: Home Health Services    Number of Visits Requested: 1      Diet general       Medications:  Reconciled Home Medications:   Discharge Medication List as of 10/26/2017  2:11 PM      START taking these medications    Details   atorvastatin (LIPITOR) 40 MG tablet Take 1 tablet (40 mg total) by mouth once daily., Starting Thu 10/26/2017, Until Fri 10/26/2018, Normal         CONTINUE these medications which have NOT CHANGED    Details   amitriptyline (ELAVIL) 50 MG tablet Take 1 tablet (50 mg total) by mouth every evening., Starting Mon 6/26/2017, Normal      amlodipine (NORVASC) 5 MG tablet Take 1 tablet (5 mg total) by mouth once daily., Starting 3/31/2017, Until Sat 3/31/18, Normal      clindamycin phosphate 1% (CLINDAGEL) 1 % gel Apply topically 2 (two) times daily., Starting Fri 8/11/2017, Until Sat 8/11/2018, Normal      CREON CpDR TAKE ONE CAPSULE BY MOUTH THREE TIMES A DAY WITH MEALS, Normal      denosumab (XGEVA) 120 mg/1.7 mL (70 mg/mL) Soln Inject 120 mg into the skin every 28 days., Until Discontinued, Historical Med      dronabinol (MARINOL) 5 MG capsule Take 1 capsule (5 mg total) by mouth 2 (two) times daily before meals., Starting Fri 7/21/2017, Normal      ergocalciferol (ERGOCALCIFEROL) 50,000 unit Cap Take 1 capsule (50,000 Units total) by mouth every 7 days., Starting Thu 9/28/2017, Normal      levetiracetam (KEPPRA) 750 MG Tab Take 1 tablet (750 mg total) by mouth 2 (two) times daily., Starting Mon 9/18/2017, Phone In      lorazepam (ATIVAN) 1 MG tablet TAKE ONE TABLET BY MOUTH TWICE DAILY, Normal      metoprolol succinate (TOPROL-XL) 50 MG 24 hr tablet Take 1 tablet (50 mg total) by mouth once daily., Starting Tue 8/1/2017, Until Wed 8/1/2018, Normal      multivitamin (THERAGRAN) per tablet Take 1 tablet by mouth once  daily., Until Discontinued, Historical Med      rivaroxaban (XARELTO) 20 mg Tab Take 1 tablet (20 mg total) by mouth daily with dinner or evening meal., Starting Fri 5/26/2017, Until Sat 5/26/2018, Normal      calcium citrate 250 mg calcium Tab Take 750 mg by mouth 3 (three) times daily., Starting Mon 7/3/2017, Until Tue 7/3/2018, OTC      erlotinib (TARCEVA) 150 MG tablet Take 1 tablet (150 mg total) by mouth once daily., Starting Mon 10/23/2017, Normal      meclizine (ANTIVERT) 12.5 mg tablet Take 1-2 tablets (12.5-25 mg total) by mouth 3 (three) times daily as needed for Dizziness., Starting Sun 10/8/2017, Print      mirabegron 50 mg Tb24 Take 1 tablet (50 mg total) by mouth once daily., Starting 10/18/2016, Until Wed 10/18/17, Normal      sodium bicarbonate 650 MG tablet Take 1 tablet (650 mg total) by mouth 3 (three) times daily. Increase dose to 2 60 mg tabs by mouth three times daily., Starting Mon 7/3/2017, Until Tue 7/3/2018, OTC             Time spent on the discharge of patient: 25 minutes    Yordy Ponce MD  Department of Physical Medicine & Rehab  [facility]

## 2017-10-27 NOTE — PROGRESS NOTES
Recreational Therapy   Discharge Note    Naty St  MRN: [id number]    Goals 1.  Pt will learn and be educated on available community resources x1 week. Met     2.  Therapist will introduce new leisure activities to enhance and stimulate new interests, to decrease boredom, improve social and physical skills x2 weeks. Met   3.  Pt will be able to voiced (2) activities/leisure skills learned in RT. Met, pt able to state 2 activities learned during this admission     Pt d/c home with family    Stacey Pandey, CTRS, CTRS  10/27/2017

## 2017-10-30 ENCOUNTER — TELEPHONE (OUTPATIENT)
Dept: ADMINISTRATIVE | Facility: CLINIC | Age: 72
End: 2017-10-30

## 2017-10-30 RX ORDER — LORAZEPAM 1 MG/1
TABLET ORAL
Qty: 60 TABLET | Refills: 1 | Status: SHIPPED | OUTPATIENT
Start: 2017-10-30 | End: 2018-04-19 | Stop reason: SDUPTHER

## 2017-11-07 ENCOUNTER — TELEPHONE (OUTPATIENT)
Dept: NEUROLOGY | Facility: CLINIC | Age: 72
End: 2017-11-07

## 2017-11-07 NOTE — TELEPHONE ENCOUNTER
Called and spoke to  regarding her appt with  on Nov 9 with is this Thursday. I stated to her that we have to cancel the appt due to  being out of the clinic that day.

## 2017-11-08 ENCOUNTER — OFFICE VISIT (OUTPATIENT)
Dept: INTERNAL MEDICINE | Facility: CLINIC | Age: 72
End: 2017-11-08
Payer: MEDICARE

## 2017-11-08 VITALS
SYSTOLIC BLOOD PRESSURE: 108 MMHG | HEART RATE: 62 BPM | OXYGEN SATURATION: 100 % | DIASTOLIC BLOOD PRESSURE: 72 MMHG | WEIGHT: 134.94 LBS | BODY MASS INDEX: 22.45 KG/M2

## 2017-11-08 DIAGNOSIS — I63.441 CEREBRAL INFARCTION DUE TO EMBOLISM OF RIGHT CEREBELLAR ARTERY: Primary | ICD-10-CM

## 2017-11-08 DIAGNOSIS — I10 ESSENTIAL HYPERTENSION: ICD-10-CM

## 2017-11-08 DIAGNOSIS — N32.81 OAB (OVERACTIVE BLADDER): ICD-10-CM

## 2017-11-08 DIAGNOSIS — C34.31 MALIGNANT NEOPLASM OF LOWER LOBE OF RIGHT LUNG: ICD-10-CM

## 2017-11-08 DIAGNOSIS — I63.9 RIGHT-SIDED CEREBROVASCULAR ACCIDENT (CVA): ICD-10-CM

## 2017-11-08 DIAGNOSIS — Z85.3 HISTORY OF BREAST CANCER: ICD-10-CM

## 2017-11-08 DIAGNOSIS — R27.0 ATAXIA: ICD-10-CM

## 2017-11-08 PROBLEM — G93.6 CYTOTOXIC CEREBRAL EDEMA: Status: RESOLVED | Noted: 2017-10-08 | Resolved: 2017-11-08

## 2017-11-08 PROCEDURE — 99999 PR PBB SHADOW E&M-EST. PATIENT-LVL III: CPT | Mod: PBBFAC,,, | Performed by: INTERNAL MEDICINE

## 2017-11-08 PROCEDURE — 99499 UNLISTED E&M SERVICE: CPT | Mod: S$GLB,,, | Performed by: INTERNAL MEDICINE

## 2017-11-08 PROCEDURE — 99214 OFFICE O/P EST MOD 30 MIN: CPT | Mod: S$GLB,,, | Performed by: INTERNAL MEDICINE

## 2017-11-08 RX ORDER — AMLODIPINE BESYLATE 5 MG/1
5 TABLET ORAL DAILY
Qty: 30 TABLET | Refills: 11 | Status: ON HOLD | OUTPATIENT
Start: 2017-11-08 | End: 2018-04-16

## 2017-11-08 NOTE — PROGRESS NOTES
Subjective:       Patient ID: Naty St is a 72 y.o. female.    Chief Complaint: Hospital Follow Up    HPI: Hospital and Rehab follow up from a few weeks ago. Summary: Patient presented to [facility] on 10/8 with speech difficulties, blurry vision, vomiting, and vertigo.  CTH revealed no acute pathology. She did not receive tPA 2/2 outside treatment window.  MRI revealed possible R MCA acute/recent infarct; however, repeat MRI does not reveal R MCA acute infarct.  MRA head/neck revealed probable occlusion of the R superior cerebral artery. Patient tolerated rehab and progressed towards functional goals.    Still with HH, PT/OT/ST.   Patient needed a few prescriptions updated.  She is up-to-date with immunizations.  She has a PET scan tomorrow, labs, hematology and possible chemo treatment on Friday.  MRI for Neurosurgery in 4 weeks.  Neither she nor her  are aware of anything they are needing at home to help improve her functional capacity.   Cap speech and balance are slowly progressing      Review of Systems   Constitutional: Positive for unexpected weight change. Negative for appetite change, chills and fever.   HENT: Negative for sore throat.    Respiratory: Negative for cough, shortness of breath and wheezing.    Cardiovascular: Negative for chest pain and leg swelling.   Gastrointestinal: Negative for abdominal pain, constipation and diarrhea.   Genitourinary: Negative for difficulty urinating.   Musculoskeletal: Positive for arthralgias and gait problem. Negative for neck pain and neck stiffness.   Skin: Negative for rash.   Neurological: Positive for dizziness, speech difficulty and light-headedness.       Objective:      Physical Exam   Constitutional: She is oriented to person, place, and time. She appears well-developed and well-nourished. No distress.   HENT:   Head: Normocephalic and atraumatic.   Mouth/Throat: Oropharynx is clear and moist. No oropharyngeal exudate.   Eyes: Conjunctivae  are normal. Pupils are equal, round, and reactive to light. No scleral icterus.   Neck: Normal range of motion. Neck supple. No thyromegaly present.   Cardiovascular: Normal rate and regular rhythm.    Pulmonary/Chest: Effort normal and breath sounds normal. She has no wheezes.   Abdominal: Soft. Bowel sounds are normal. She exhibits no distension. There is no tenderness.   Musculoskeletal: She exhibits no tenderness.   Lymphadenopathy:     She has no cervical adenopathy.   Neurological: She is alert and oriented to person, place, and time. She exhibits abnormal muscle tone. Coordination abnormal.   Mild slowing of speech   Skin: No rash noted.   Psychiatric: She has a normal mood and affect. Her behavior is normal.       Assessment:       1. 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    2. OAB (overactive bladder)    3. Essential hypertension    4. Malignant neoplasm of lower lobe of right lung    5. History of breast cancer    6. Ataxia    7. Right-sided cerebrovascular accident (CVA)        Plan:       Naty was seen today for hospital follow up.    Diagnoses and all orders for this visit:    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    OAB (overactive bladder)  -     mirabegron 50 mg Tb24; Take 1 tablet (50 mg total) by mouth once daily.    Essential hypertension    Malignant neoplasm of lower lobe of right lung    History of breast cancer    Ataxia    Right-sided cerebrovascular accident (CVA)    Other orders  -     amLODIPine (NORVASC) 5 MG tablet; Take 1 tablet (5 mg total) by mouth once daily.        Follow up 3-4 weeks.

## 2017-11-09 ENCOUNTER — HOSPITAL ENCOUNTER (OUTPATIENT)
Dept: RADIOLOGY | Facility: HOSPITAL | Age: 72
Discharge: HOME | End: 2017-11-09
Attending: INTERNAL MEDICINE
Payer: MEDICARE

## 2017-11-09 DIAGNOSIS — C34.31 MALIGNANT NEOPLASM OF LOWER LOBE OF RIGHT LUNG: ICD-10-CM

## 2017-11-09 LAB — POCT GLUCOSE: 80 MG/DL (ref 70–110)

## 2017-11-09 PROCEDURE — A9552 F18 FDG: HCPCS

## 2017-11-09 PROCEDURE — 78815 PET IMAGE W/CT SKULL-THIGH: CPT | Mod: 26,PS,, | Performed by: NUCLEAR MEDICINE

## 2017-11-10 ENCOUNTER — INFUSION (OUTPATIENT)
Dept: INFUSION THERAPY | Facility: HOSPITAL | Age: 72
End: 2017-11-10
Attending: INTERNAL MEDICINE
Payer: MEDICARE

## 2017-11-10 ENCOUNTER — OFFICE VISIT (OUTPATIENT)
Dept: HEMATOLOGY/ONCOLOGY | Facility: CLINIC | Age: 72
End: 2017-11-10
Payer: MEDICARE

## 2017-11-10 DIAGNOSIS — E83.42 HYPOMAGNESEMIA: Primary | ICD-10-CM

## 2017-11-10 DIAGNOSIS — C79.51 SECONDARY CANCER OF BONE: ICD-10-CM

## 2017-11-10 DIAGNOSIS — C34.31 MALIGNANT NEOPLASM OF LOWER LOBE OF RIGHT LUNG: Primary | ICD-10-CM

## 2017-11-10 DIAGNOSIS — N18.30 CKD (CHRONIC KIDNEY DISEASE) STAGE 3, GFR 30-59 ML/MIN: ICD-10-CM

## 2017-11-10 DIAGNOSIS — E83.51 HYPOCALCEMIA: ICD-10-CM

## 2017-11-10 DIAGNOSIS — I63.441 CEREBRAL INFARCTION DUE TO EMBOLISM OF RIGHT CEREBELLAR ARTERY: ICD-10-CM

## 2017-11-10 DIAGNOSIS — I82.419 ACUTE DEEP VEIN THROMBOSIS (DVT) OF FEMORAL VEIN, UNSPECIFIED LATERALITY: ICD-10-CM

## 2017-11-10 DIAGNOSIS — C79.31 BRAIN METASTASES: ICD-10-CM

## 2017-11-10 PROBLEM — I82.409 ACUTE DEEP VEIN THROMBOSIS: Status: ACTIVE | Noted: 2017-11-10

## 2017-11-10 PROCEDURE — 96372 THER/PROPH/DIAG INJ SC/IM: CPT

## 2017-11-10 PROCEDURE — 99215 OFFICE O/P EST HI 40 MIN: CPT | Mod: S$GLB,,, | Performed by: INTERNAL MEDICINE

## 2017-11-10 PROCEDURE — 63600175 PHARM REV CODE 636 W HCPCS: Performed by: INTERNAL MEDICINE

## 2017-11-10 PROCEDURE — 99999 PR PBB SHADOW E&M-EST. PATIENT-LVL III: CPT | Mod: PBBFAC,,, | Performed by: INTERNAL MEDICINE

## 2017-11-10 PROCEDURE — 99499 UNLISTED E&M SERVICE: CPT | Mod: S$GLB,,, | Performed by: INTERNAL MEDICINE

## 2017-11-10 RX ORDER — MAGNESIUM L-LACTATE 84 MG
84 TABLET, EXTENDED RELEASE ORAL DAILY
Qty: 14 TABLET | Refills: 1 | Status: SHIPPED | OUTPATIENT
Start: 2017-11-10 | End: 2017-12-08

## 2017-11-10 RX ADMIN — DENOSUMAB 120 MG: 120 INJECTION SUBCUTANEOUS at 11:11

## 2017-11-10 NOTE — PROGRESS NOTES
"Subjective:       Patient ID: Naty St is a 72 y.o. female.    Chief Complaint: Follow-up and Other (Pt had a stroke Oct 2017. Pt has concerns about walking and talking.)  Oncologic History:  Ms. Naty St was admitted to the hospital between 01/25/2016 and 01/28/2016. She has a history of pancreatic cancer 17 years ago, breast cancer and meningioma, presented to the hospital complaining of loss of consciousness. The patient apparently was in her normal state of health and she stood up to go to the bathroom and fell to the floor. The patient's daughter helped the mother up in the bathroom and noted that her mother's upper extremities were shaking and eye rolling. No reports of bowel or bladder incontinence, tongue biting or rolling. The second episode lasted about four minutes and she was extremely lethargic following that. Apparently, the patient had a meningioma resection done in the past; however, recently, underwent neurosurgical resection with Dr. 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    Additional immunohistochemical stains were performed which revealed the tumor cells to be are positive for TTF1 and negative for ER and GCDFP. The morphology and TTF1 positivity are most consistent with lung primary.    Of note, imaging scan at the end of January 2016 revealed a mass in the lung at 2 cm in the medial aspect of the apical segment of the right upper lobe abutting the mediastinum at the level of the azygous vein and abutting and possibly encasing the segmental bronchi and vessels of the apical segment of the right upper lobe and no pleural fluid was present. Also, there is an enlarged right paratracheal lymph node. No evidence of any metastatic disease at the pancreatic site with postoperative changes post Whipple disease  Her PET Scan from 2/15/16 reveal "Hypermetabolic mass in " "the right lung apex consistent with a primary malignancy. Hypermetabolic mediastinal lymph nodes consistent with metastatic disease. Right sacral hypermetabolic lesion consistent with metastatic disease, noting additional mildly sclerotic lesions in multiple vertebral bodies which do not demonstrate abnormal hypermetabolism  She underwent IR bone biopsy which revealed metastatic adenocarcinoma of lung origin. 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  She has completed focal RT to brain lesions in March 2016.    PET scan from 6/6/16 shows "Dramatic almost complete response to therapy."  Lovenox started after US lower ext 6/7/16 shows Acute complete occlusion of one of the left posterior tibial vein.Remote partial thrombus of the proximal left superficial femoral vein.  She is on Tarceva.   12/6/16 PET scan reveals "Stable right upper lobe lesion and right hilar lymph node. No new lesions identified. Trace left pleural effusion".  1/27/17 Renal u/s "Medical renal disease. Nonobstructive right nephrolithiasis"  1/31/17 PET - "Right upper lobe nodule and right hilar lymph node similar and very low grade activity.  There is no definite evidence of recurrence."                HPI Ms. St returns for follow up and to review her PET scan "In this patient with history of lung cancer, there is interval increase in size and hypermetabolism of a right upper lobe lung lesion concerning for recurrent disease. Additionally, there is interval appearance of a hypermetabolic paratracheal lymph node suspicious for metastatic disease"  She recently had a stroke and seems to have recovered.      Review of Systems   Constitutional: Negative for appetite change, fatigue and unexpected weight change.   HENT: Negative for mouth sores.    Eyes: Negative for visual disturbance.   Respiratory: Negative for cough and shortness of breath.    Cardiovascular: Negative for chest pain.   Gastrointestinal: Negative for abdominal pain and " diarrhea.   Genitourinary: Negative for frequency.   Musculoskeletal: Negative for back pain.   Skin: Negative for rash.   Neurological: Negative for headaches.   Hematological: Negative for adenopathy.   Psychiatric/Behavioral: The patient is not nervous/anxious.    All other systems reviewed and are negative.      Objective:      Physical Exam   Constitutional: She is oriented to person, place, and time. She appears well-developed and well-nourished.   HENT:   Mouth/Throat: No oropharyngeal exudate.   Cardiovascular: Normal rate and normal heart sounds.    Pulmonary/Chest: Effort normal and breath sounds normal. She has no wheezes.   Abdominal: Soft. Bowel sounds are normal. There is no tenderness.   Musculoskeletal: She exhibits no edema or tenderness.   Lymphadenopathy:     She has no cervical adenopathy.   Neurological: She is alert and oriented to person, place, and time. Coordination normal.   Skin: Skin is warm and dry. No rash noted.   Psychiatric: She has a normal mood and affect. Judgment and thought content normal.   Vitals reviewed.        LABS:  WBC   Date Value Ref Range Status   11/10/2017 4.62 3.90 - 12.70 K/uL Final     Hemoglobin   Date Value Ref Range Status   11/10/2017 9.2 (L) 12.0 - 16.0 g/dL Final     POC Hematocrit   Date Value Ref Range Status   01/12/2016 25 (L) 36 - 54 %PCV Final     Hematocrit   Date Value Ref Range Status   11/10/2017 29.9 (L) 37.0 - 48.5 % Final     Platelets   Date Value Ref Range Status   11/10/2017 258 150 - 350 K/uL Final     Gran #   Date Value Ref Range Status   11/10/2017 2.7 1.8 - 7.7 K/uL Final     Comment:     The ANC is based on a white cell differential from an   automated cell counter. It has not been microscopically   reviewed for the presence of abnormal cells. Clinical   correlation is required.         Chemistry        Component Value Date/Time     11/10/2017 0912     11/10/2017 0912    K 3.5 11/10/2017 0912    K 3.5 11/10/2017 0912    CL  118 (H) 11/10/2017 0912     (H) 11/10/2017 0912    CO2 19 (L) 11/10/2017 0912    CO2 19 (L) 11/10/2017 0912    BUN 20 11/10/2017 0912    BUN 20 11/10/2017 0912    CREATININE 1.5 (H) 11/10/2017 0912    CREATININE 1.5 (H) 11/10/2017 0912    GLU 94 11/10/2017 0912    GLU 94 11/10/2017 0912        Component Value Date/Time    CALCIUM 7.8 (L) 11/10/2017 0912    CALCIUM 7.8 (L) 11/10/2017 0912    CALCIUM 7.8 (L) 11/10/2017 0912    ALKPHOS 54 (L) 11/10/2017 0912    AST 31 11/10/2017 0912    ALT 21 11/10/2017 0912    BILITOT 1.0 11/10/2017 0912    ESTGFRAFRICA 39.6 (A) 11/10/2017 0912    ESTGFRAFRICA 39.6 (A) 11/10/2017 0912    EGFRNONAA 34.3 (A) 11/10/2017 0912    EGFRNONAA 34.3 (A) 11/10/2017 0912          Assessment:       1. Malignant neoplasm of lower lobe of right lung    2. Secondary cancer of bone    3. Brain metastases    4. 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 CKD (chronic kidney disease) stage 3, GFR 30-59 ml/min    6. Acute deep vein thrombosis (DVT) of femoral vein, unspecified laterality    7. Hypocalcemia        Plan:         1,2,3. Reviewed CT scan, there is a slight increase in the lung lesion and a new paratracheal lymph node. Sent for Guardent testing today. May need to send for Bronch biopsy if Guardant is negative.  She will proceed with Xgeva today/  4. Recovering, continue lipitor.  5. Stable, follows with Nephrology.  6. She will continue with Xarelto.  7. Corrected calcium is 8.8. Advised to take calcium supplementation.      Above care plan was discussed with patient and accompanying  and all questions were addressed to their satisfaction. Also discussed above plan with son, Basil on the phone.

## 2017-11-10 NOTE — Clinical Note
Can you do EBUS please. I need to send for T790M mutauion. She is currently on Tarceva so worried about resistance

## 2017-11-10 NOTE — NURSING
Pt arrived for xgeva folllowing MD appt.  Labs reviewed with pt.  Pt tolerated injection SQ to right side of abd.  Discharged to home in wheelchair with family.

## 2017-11-13 DIAGNOSIS — R91.1 NODULE OF RIGHT LUNG: Primary | ICD-10-CM

## 2017-11-14 ENCOUNTER — TELEPHONE (OUTPATIENT)
Dept: PHARMACY | Facility: CLINIC | Age: 72
End: 2017-11-14

## 2017-11-14 ENCOUNTER — TELEPHONE (OUTPATIENT)
Dept: NEUROSURGERY | Facility: CLINIC | Age: 72
End: 2017-11-14

## 2017-11-14 NOTE — TELEPHONE ENCOUNTER
----- Message from Shukri Taveras sent at 11/14/2017 10:19 AM CST -----  Contact: Basil (son) @ [phone]  Basil is asking to speak with EJ to f/u about pt. Pls call.

## 2017-11-14 NOTE — TELEPHONE ENCOUNTER
JESSENIA FOURNIER, APPT MADE TO FU WITH DR ESTRADA AFTER THE MRI IS DONE ON THE 5TH. APPT SLIP IN THE MAIL.

## 2017-11-16 NOTE — TELEPHONE ENCOUNTER
Patient requested call back on Monday.  Will confirm exact count of Tarceva doses remaining at that time.  She states that she has recently had a stroke followed by 2 weeks of rehab and has improved greatly during this time.  Otherwise, she is feeling ok and denies missing any doses.  She did mention the addition of Lipitor to her current regimen which we are not anticipating any interactions with Tarceva.  Will f/u on Monday for refill, copay $0.00 (004).

## 2017-11-20 DIAGNOSIS — F32.A DEPRESSION, UNSPECIFIED DEPRESSION TYPE: ICD-10-CM

## 2017-11-20 RX ORDER — AMITRIPTYLINE HYDROCHLORIDE 50 MG/1
50 TABLET, FILM COATED ORAL NIGHTLY
Qty: 30 TABLET | Refills: 2 | Status: SHIPPED | OUTPATIENT
Start: 2017-11-20 | End: 2017-11-21 | Stop reason: SDUPTHER

## 2017-11-20 NOTE — TELEPHONE ENCOUNTER
----- Message from Tyler Barroso sent at 11/20/2017  3:38 PM CST -----  Contact: self [phone]  Type: Rx    Name of medication(s):  amitriptyline (ELAVIL) 50 MG tablet    Is this a refill? New rx? Refill      Who prescribed medication?    Pharmacy Name, Phone, & Location: [facility]  [phone]     Comments: please advise, Thanks

## 2017-11-21 RX ORDER — AMITRIPTYLINE HYDROCHLORIDE 50 MG/1
50 TABLET, FILM COATED ORAL NIGHTLY
Qty: 30 TABLET | Refills: 2 | Status: SHIPPED | OUTPATIENT
Start: 2017-11-21 | End: 2018-05-29 | Stop reason: SDUPTHER

## 2017-11-21 NOTE — TELEPHONE ENCOUNTER
"----- Message from Meseret Gill sent at 11/20/2017  4:48 PM CST -----  Contact: self/[phone]      RX request - refill or new RX.  Is this a refill or new RX:  Refill  RX name and strength: amitriptyline (ELAVIL) 50 MG tablet  Directions: Take 1 tablet (50 mg total) by mouth every evening. - Oral  Is this a 30 day or 90 day RX:  30  Pharmacy name and phone # (DON'T enter "on file" or "in chart"):[facility] [address] [phone] (Phone) [phone] (Fax)   Comments:  Please call and advise.     Thank you        "

## 2017-11-22 ENCOUNTER — TELEPHONE (OUTPATIENT)
Dept: INTERNAL MEDICINE | Facility: CLINIC | Age: 72
End: 2017-11-22

## 2017-11-22 NOTE — TELEPHONE ENCOUNTER
----- Message from Meseret Gill sent at 11/21/2017  4:08 PM CST -----  Contact: Rose Mary/[phone]  Type:Home Health(orders,updates,clarifications,etc)    Home Health Agency/Nurse: [facility]    Phone number: [phone]    Reason for call:    Comments: asking for permission for a few more weeks for physical therapy to work more on patient balance. Please call and advise.       Thank you!!!

## 2017-11-22 NOTE — TELEPHONE ENCOUNTER
Spoke to Edie and she would like to continue PT so that pt can work on her balance. Edie says pt reported a new fall the day before yesterday.  See note below

## 2017-11-27 PROBLEM — Z79.01 ANTICOAGULANT LONG-TERM USE: Status: ACTIVE | Noted: 2017-11-27

## 2017-11-28 ENCOUNTER — RESEARCH ENCOUNTER (OUTPATIENT)
Dept: RESEARCH | Facility: HOSPITAL | Age: 72
End: 2017-11-28

## 2017-11-28 ENCOUNTER — OFFICE VISIT (OUTPATIENT)
Dept: HEMATOLOGY/ONCOLOGY | Facility: CLINIC | Age: 72
End: 2017-11-28
Payer: MEDICARE

## 2017-11-28 VITALS
DIASTOLIC BLOOD PRESSURE: 68 MMHG | HEIGHT: 66 IN | SYSTOLIC BLOOD PRESSURE: 145 MMHG | RESPIRATION RATE: 19 BRPM | WEIGHT: 140.19 LBS | HEART RATE: 65 BPM | BODY MASS INDEX: 22.53 KG/M2 | OXYGEN SATURATION: 99 % | TEMPERATURE: 98 F

## 2017-11-28 DIAGNOSIS — Z79.01 ANTICOAGULANT LONG-TERM USE: ICD-10-CM

## 2017-11-28 DIAGNOSIS — C79.51 SECONDARY CANCER OF BONE: ICD-10-CM

## 2017-11-28 DIAGNOSIS — D63.1 ANEMIA OF CHRONIC RENAL FAILURE, STAGE 3 (MODERATE): ICD-10-CM

## 2017-11-28 DIAGNOSIS — I63.441 CEREBRAL INFARCTION DUE TO EMBOLISM OF RIGHT CEREBELLAR ARTERY: ICD-10-CM

## 2017-11-28 DIAGNOSIS — I82.419 ACUTE DEEP VEIN THROMBOSIS (DVT) OF FEMORAL VEIN, UNSPECIFIED LATERALITY: ICD-10-CM

## 2017-11-28 DIAGNOSIS — F43.21 ADJUSTMENT DISORDER WITH DEPRESSED MOOD: ICD-10-CM

## 2017-11-28 DIAGNOSIS — N18.30 ANEMIA OF CHRONIC RENAL FAILURE, STAGE 3 (MODERATE): ICD-10-CM

## 2017-11-28 DIAGNOSIS — C34.31 MALIGNANT NEOPLASM OF LOWER LOBE OF RIGHT LUNG: Primary | ICD-10-CM

## 2017-11-28 DIAGNOSIS — N18.30 CKD (CHRONIC KIDNEY DISEASE) STAGE 3, GFR 30-59 ML/MIN: ICD-10-CM

## 2017-11-28 DIAGNOSIS — C79.31 BRAIN METASTASES: ICD-10-CM

## 2017-11-28 PROCEDURE — 99215 OFFICE O/P EST HI 40 MIN: CPT | Mod: S$GLB,,, | Performed by: INTERNAL MEDICINE

## 2017-11-28 PROCEDURE — 99499 UNLISTED E&M SERVICE: CPT | Mod: S$GLB,,, | Performed by: INTERNAL MEDICINE

## 2017-11-28 PROCEDURE — 99999 PR PBB SHADOW E&M-EST. PATIENT-LVL V: CPT | Mod: PBBFAC,,, | Performed by: INTERNAL MEDICINE

## 2017-11-28 RX ORDER — CHOLECALCIFEROL (VITAMIN D3) 25 MCG
1000 TABLET ORAL DAILY
COMMUNITY
End: 2018-01-11

## 2017-11-28 NOTE — PROGRESS NOTES
PATIENT: Naty St  MRN: [id number]  DATE: 11/28/2017    Subjective:     Chief complaint:  Chief Complaint   Patient presents with    Malignant neoplasm of lower lobe of right lung    dry cough    constipation-last BM 2 days ago, not passing gas    no hemoptysis       Oncologic History:  Ms. Naty St was admitted to the hospital between 01/25/2016 and 01/28/2016. She has a history of pancreatic cancer 17 years ago, breast cancer and meningioma, presented to the hospital complaining of loss of consciousness. The patient apparently was in her normal state of health and she stood up to go to the bathroom and fell to the floor. The patient's daughter helped the mother up in the bathroom and noted that her mother's upper extremities were shaking and eye rolling. No reports of bowel or bladder incontinence, tongue biting or rolling. The second episode lasted about four minutes and she was extremely lethargic following that. Apparently, the patient had a meningioma resection done in the past; however, recently, underwent neurosurgical resection with Dr. 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    Additional immunohistochemical stains were performed which revealed the tumor cells to be are positive for TTF1 and negative for ER and GCDFP. The morphology and TTF1 positivity are most consistent with lung primary.    Of note, imaging scan at the end of January 2016 revealed a mass in the lung at 2 cm in the medial aspect of the apical segment of the right upper lobe abutting the mediastinum at the level of the azygous vein and abutting and possibly encasing the segmental bronchi and vessels of the apical segment of the right upper lobe and no pleural fluid was present. Also, there is an enlarged right paratracheal lymph node. No evidence of any metastatic disease at the pancreatic site with  "postoperative changes post Whipple disease  Her PET Scan from 2/15/16 reveal "Hypermetabolic mass in the right lung apex consistent with a primary malignancy. Hypermetabolic mediastinal lymph nodes consistent with metastatic disease. Right sacral hypermetabolic lesion consistent with metastatic disease, noting additional mildly sclerotic lesions in multiple vertebral bodies which do not demonstrate abnormal hypermetabolism  She underwent IR bone biopsy which revealed metastatic adenocarcinoma of lung origin. 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  She has completed focal RT to brain lesions in March 2016.    PET scan from 6/6/16 shows "Dramatic almost complete response to therapy."  Lovenox started after US lower ext 6/7/16 shows Acute complete occlusion of one of the left posterior tibial vein.Remote partial thrombus of the proximal left superficial femoral vein.  She is on Tarceva.   12/6/16 PET scan reveals "Stable right upper lobe lesion and right hilar lymph node. No new lesions identified. Trace left pleural effusion".  1/27/17 Renal u/s "Medical renal disease. Nonobstructive right nephrolithiasis"  1/31/17 PET - "Right upper lobe nodule and right hilar lymph node similar and very low grade activity.  There is no definite evidence of recurrence."   11/9/17 PET "In this patient with history of lung cancer, there is interval increase in size and hypermetabolism of a right upper lobe lung lesion concerning for recurrent disease. Additionally, there is interval appearance of a hypermetabolic paratracheal lymph node suspicious for metastatic disease"         Interval History: Ms. St returns for follow up. She is tolerating Tarceva well. Xgeva not due.  EBUS is scheduled for 12/5/17. Tissue will be sent for  T790M mutauion. She continues to undergo PT post CVA. She has occasional constipation.   She denies any nausea, vomiting, diarrhea,  abdominal pain, weight loss or loss of appetite, chest pain, shortness " "of breath, leg swelling, fatigue, pain, headache, dizziness, or mood changes. She is accompanied by her son and spouse.    ECOG Performance Status:   ECOG SCORE    0 - Fully active-able to carry on all pre-disease performance without restriction         PMFSH: all information reviewed and updated as relevant to today's visit    Review of Systems:   Review of Systems   Constitutional: Negative for activity change, appetite change, fatigue and fever.   HENT: Negative for mouth sores, nosebleeds and sore throat.    Eyes: Negative for visual disturbance.   Respiratory: Negative for cough and shortness of breath.    Cardiovascular: Negative for chest pain, palpitations and leg swelling.   Gastrointestinal: Positive for constipation. Negative for abdominal pain, diarrhea, nausea and vomiting.   Genitourinary: Negative for difficulty urinating and frequency.   Musculoskeletal: Negative for arthralgias and back pain.   Skin: Negative for rash.   Neurological: Negative for dizziness, numbness and headaches.   Hematological: Negative for adenopathy. Does not bruise/bleed easily.   Psychiatric/Behavioral: Negative for confusion and sleep disturbance. The patient is not nervous/anxious.    All other systems reviewed and are negative.        Objective:      Vitals:   Vitals:    11/28/17 1130   BP: (!) 145/68   Pulse: 65   Resp: 19   Temp: 98 °F (36.7 °C)   TempSrc: Oral   SpO2: 99%   Weight: 63.6 kg (140 lb 3.4 oz)   Height: 5' 6" (1.676 m)     BMI: Body mass index is 22.63 kg/m².      Physical Exam:   Physical Exam   Constitutional: She is oriented to person, place, and time. She appears well-developed and well-nourished. No distress.   HENT:   Head: Normocephalic.   Right Ear: External ear normal.   Left Ear: External ear normal.   Mouth/Throat: No oropharyngeal exudate.   No sinus tenderness.   Eyes: Conjunctivae and lids are normal. Pupils are equal, round, and reactive to light. No scleral icterus.   Neck: Trachea normal " and normal range of motion. Neck supple. No thyromegaly present.   Cardiovascular: Normal rate, regular rhythm and normal heart sounds.    Pulmonary/Chest: Effort normal and breath sounds normal.   Abdominal: Soft. Normal appearance and bowel sounds are normal. She exhibits no distension and no mass. There is no tenderness.   Musculoskeletal: Normal range of motion.   Lymphadenopathy:        Head (right side): No submental and no submandibular adenopathy present.        Head (left side): No submental and no submandibular adenopathy present.     She has no cervical adenopathy.   Neurological: She is alert and oriented to person, place, and time. She has normal strength. No cranial nerve deficit. Gait normal.   Skin: Skin is warm, dry and intact. No bruising and no rash noted. No cyanosis. Nails show no clubbing.   Psychiatric: She has a normal mood and affect. Her speech is normal and behavior is normal.   Nursing note and vitals reviewed.        Laboratory Data:  WBC   Date Value Ref Range Status   11/10/2017 4.62 3.90 - 12.70 K/uL Final     Hemoglobin   Date Value Ref Range Status   11/10/2017 9.2 (L) 12.0 - 16.0 g/dL Final     POC Hematocrit   Date Value Ref Range Status   01/12/2016 25 (L) 36 - 54 %PCV Final     Hematocrit   Date Value Ref Range Status   11/10/2017 29.9 (L) 37.0 - 48.5 % Final     Platelets   Date Value Ref Range Status   11/10/2017 258 150 - 350 K/uL Final     Gran #   Date Value Ref Range Status   11/10/2017 2.7 1.8 - 7.7 K/uL Final     Comment:     The ANC is based on a white cell differential from an   automated cell counter. It has not been microscopically   reviewed for the presence of abnormal cells. Clinical   correlation is required.         Chemistry        Component Value Date/Time     11/10/2017 0912     11/10/2017 0912    K 3.5 11/10/2017 0912    K 3.5 11/10/2017 0912     (H) 11/10/2017 0912     (H) 11/10/2017 0912    CO2 19 (L) 11/10/2017 0912    CO2 19 (L)  11/10/2017 0912    BUN 20 11/10/2017 0912    BUN 20 11/10/2017 0912    CREATININE 1.5 (H) 11/10/2017 0912    CREATININE 1.5 (H) 11/10/2017 0912    GLU 94 11/10/2017 0912    GLU 94 11/10/2017 0912        Component Value Date/Time    CALCIUM 7.8 (L) 11/10/2017 0912    CALCIUM 7.8 (L) 11/10/2017 0912    CALCIUM 7.8 (L) 11/10/2017 0912    ALKPHOS 54 (L) 11/10/2017 0912    AST 31 11/10/2017 0912    ALT 21 11/10/2017 0912    BILITOT 1.0 11/10/2017 0912    ESTGFRAFRICA 39.6 (A) 11/10/2017 0912    ESTGFRAFRICA 39.6 (A) 11/10/2017 0912    EGFRNONAA 34.3 (A) 11/10/2017 0912    EGFRNONAA 34.3 (A) 11/10/2017 0912              Assessment/Plan:     1. Malignant neoplasm of lower lobe of right lung    2. Secondary cancer of bone    3. Brain metastases    4. Anemia of chronic renal failure, stage 3 (moderate)    5. 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 Adjustment disorder with depressed mood    7. CKD (chronic kidney disease) stage 3, GFR 30-59 ml/min    8. Acute deep vein thrombosis (DVT) of femoral vein, unspecified laterality    9. Anticoagulant long-term use        Plan:    Continue Tarceva. EBUS as scheduled 12/5/17.  Tissue will be sent for  T790M mutauion. RTC once we receive results. Zandra will schedule follow up.     More than 30 mins were spent during this encounter, greater than 50% was spent in direct counseling and/or coordination of care.   Patient was also seen and examined by Dr. Vazquez who agrees with above plan. Patient is in agreement with the proposed treatment plan. All questions were answered to the patient's satisfaction. Pt knows to call clinic if anything is needed before the next clinic visit.      JASMIN Pastrana  Hematology and Medical Oncology    Distress Screening Results: Psychosocial Distress screening score of Distress Score: 0 noted and reviewed. No intervention indicated.     STAFF NOTE:  I have reviewed the notes, assessments, and/or procedures  performed by the nurse practitioner, as above.  I have personally interviewed the patient at the beside, and rounded with the nurse practitioner. I formulated the plan of care.  I concur with her documentation of Naty St.

## 2017-11-28 NOTE — PROGRESS NOTES
Pt and her family members were approached in Hem-Onc clinic regarding participation in protocol [id number], IRB # 2016.122.A. Pt was agreeable.   The ICF was reviewed with pt. The discussion included:   - participation is voluntary;   - pt can change her mind about participating up until the samples are collected;  - if she changes his mind about participation after collection we cannot get that sample back from sponsor;   - samples that have been used prior to her notification will still be included in research;   - specimens collected include only those discussed with the patient at the time of consent and are indicated on the ICF;    - specimens will be given a unique code that can only be linked to pt by [facility] staff;  - all medical information released to researchers will be stripped of identifiers;   - there is a small risk of loss of confidentiality, but we make every effort to ensure privacy;  - no other physical risks outside of those involved in standard of care procedure.      Pt was informed that participation in this study would not preclude her from participating in any drug study/other research if offered.   Pt and family members questions were answered. Pt willingly and independently signed ICF.    A copy of signed ICF was given to pt with instructions to call with any questions that may arise or if she should change her mind regarding participation in [facility] program.

## 2017-11-30 ENCOUNTER — OFFICE VISIT (OUTPATIENT)
Dept: PULMONOLOGY | Facility: CLINIC | Age: 72
End: 2017-11-30
Payer: MEDICARE

## 2017-11-30 VITALS
SYSTOLIC BLOOD PRESSURE: 138 MMHG | HEIGHT: 66 IN | WEIGHT: 136.44 LBS | HEART RATE: 70 BPM | BODY MASS INDEX: 21.93 KG/M2 | OXYGEN SATURATION: 98 % | DIASTOLIC BLOOD PRESSURE: 70 MMHG

## 2017-11-30 DIAGNOSIS — Z79.01 ANTICOAGULANT LONG-TERM USE: ICD-10-CM

## 2017-11-30 DIAGNOSIS — C34.90 EGFR-RELATED LUNG CANCER: ICD-10-CM

## 2017-11-30 DIAGNOSIS — C34.91 ADENOCARCINOMA OF LUNG, RIGHT: Primary | ICD-10-CM

## 2017-11-30 DIAGNOSIS — R59.0 MEDIASTINAL ADENOPATHY: ICD-10-CM

## 2017-11-30 PROCEDURE — 99999 PR PBB SHADOW E&M-EST. PATIENT-LVL III: CPT | Mod: PBBFAC,,, | Performed by: INTERNAL MEDICINE

## 2017-11-30 PROCEDURE — 99204 OFFICE O/P NEW MOD 45 MIN: CPT | Mod: S$GLB,,, | Performed by: INTERNAL MEDICINE

## 2017-11-30 PROCEDURE — 99499 UNLISTED E&M SERVICE: CPT | Mod: S$GLB,,, | Performed by: INTERNAL MEDICINE

## 2017-11-30 NOTE — LETTER
November 30, 2017      Karrie Vazquez MD  [address]  [place] [address]           [facility]  [address]  [place] [address]  Phone: [phone]          Patient: Naty St   MR Number: [id number]   YOB: 1945   Date of Visit: 11/30/2017       Dear Dr. Karrie Vazquez:    Thank you for referring Naty St to me for evaluation. Attached you will find relevant portions of my assessment and plan of care.    If you have questions, please do not hesitate to call me. I look forward to following Naty St along with you.    Sincerely,    Kiya Zhang MD    Enclosure  CC:  No Recipients    If you would like to receive this communication electronically, please contact [email or web address] or [phone] to request more information on [employer] Link access.    For providers and/or their staff who would like to refer a patient to [facility], please contact us through our one-stop-shop provider referral line, [facility], at [phone].    If you feel you have received this communication in error or would no longer like to receive these types of communications, please e-mail [email or web address]

## 2017-11-30 NOTE — PROGRESS NOTES
"Subjective:       Patient ID: Naty St is a 72 y.o. female.    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  Currently on Tarceva (since March 2016)  Seems to have disease progression and needs rebiopsy for T70 Mutation.      Pulmonary Nodules   Associated symptoms include coughing (after drinking water) and a sore throat. Pertinent negatives include no arthralgias, chest pain, fever or headaches.     Review of Systems   Constitutional: Negative for fever.   HENT: Positive for postnasal drip, sore throat and trouble swallowing.    Respiratory: Positive for cough (after drinking water). Negative for hemoptysis, sputum production and shortness of breath.    Cardiovascular: Negative for chest pain and leg swelling.   Genitourinary: Negative for difficulty urinating.   Musculoskeletal: Negative for arthralgias.   Gastrointestinal: Negative for acid reflux.   Neurological: Negative for headaches.   Psychiatric/Behavioral: Negative for confusion.         On xarelto for DVT in June 2016 subsequent LE doppler ultrasound in October 2016 and April 2017 is negative. 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  NO personal or family history  Objective:       Vitals:    11/30/17 1454   BP: 138/70   Pulse: 70   SpO2: 98%   Weight: 61.9 kg (136 lb 7.4 oz)   Height: 5' 5.5" (1.664 m)     Physical Exam   Constitutional: She is oriented to person, place, and time. She appears well-developed and well-nourished.   HENT:   Right Ear: External ear normal.   Left Ear: External ear normal.   Posterior oropharynx erythematous   Neck: Normal range of motion. Neck supple.   Cardiovascular: Normal rate and regular rhythm.    Pulmonary/Chest: She has no wheezes. She has no rales.   Abdominal: Soft. Bowel sounds are normal.   Musculoskeletal: Normal range of motion. "   Lymphadenopathy: No supraclavicular adenopathy is present.     She has no cervical adenopathy.   Neurological: She is alert and oriented to person, place, and time.   Skin: Skin is warm and dry.   Psychiatric: She has a normal mood and affect.     Personal Diagnostic Review  PET/cT 77/9/2017  with right paratracheal in 2R/4R region and 10R lymph nodes increased in hypermetabolic activity from June, July and september  No flowsheet data found.      Assessment:       1. Adenocarcinoma of lung, right    2. Mediastinal adenopathy    3. EGFR-related lung cancer    4. Anticoagulant long-term use        Outpatient Encounter Prescriptions as of 11/30/2017   Medication Sig Dispense Refill    amitriptyline (ELAVIL) 50 MG tablet Take 1 tablet (50 mg total) by mouth every evening. 30 tablet 2    amLODIPine (NORVASC) 5 MG tablet Take 1 tablet (5 mg total) by mouth once daily. 30 tablet 11    atorvastatin (LIPITOR) 40 MG tablet Take 1 tablet (40 mg total) by mouth once daily. 90 tablet 3    calcium citrate 250 mg calcium Tab Take 750 mg by mouth 3 (three) times daily.  0    clindamycin phosphate 1% (CLINDAGEL) 1 % gel Apply topically 2 (two) times daily. 60 g 6    CREON CpDR TAKE ONE CAPSULE BY MOUTH THREE TIMES A DAY WITH MEALS 270 capsule 3    denosumab (XGEVA) 120 mg/1.7 mL (70 mg/mL) Soln Inject 120 mg into the skin every 28 days.      dronabinol (MARINOL) 5 MG capsule Take 1 capsule (5 mg total) by mouth 2 (two) times daily before meals. 60 capsule 3    erlotinib (TARCEVA) 150 MG tablet Take 1 tablet (150 mg total) by mouth once daily. 60 tablet 12    levetiracetam (KEPPRA) 750 MG Tab Take 1 tablet (750 mg total) by mouth 2 (two) times daily. 60 tablet 3    LORazepam (ATIVAN) 1 MG tablet TAKE ONE TABLET BY MOUTH TWICE DAILY 60 tablet 1    magnesium L-lactate (MAGTAB) 84 mg TbSR Take 1 tablet (84 mg total) by mouth once daily. Then one tablet twice weekly thereafter 14 tablet 1    metoprolol succinate  (TOPROL-XL) 50 MG 24 hr tablet Take 1 tablet (50 mg total) by mouth once daily. 30 tablet 11    mirabegron 50 mg Tb24 Take 1 tablet (50 mg total) by mouth once daily. 30 tablet 11    multivitamin (THERAGRAN) per tablet Take 1 tablet by mouth once daily.      rivaroxaban (XARELTO) 20 mg Tab Take 1 tablet (20 mg total) by mouth daily with dinner or evening meal. 30 tablet 11    sodium bicarbonate 650 MG tablet Take 1 tablet (650 mg total) by mouth 3 (three) times daily. Increase dose to 2 60 mg tabs by mouth three times daily. 270 tablet 11    vitamin D 1000 units Tab Take 1,000 Units by mouth once daily.      meclizine (ANTIVERT) 12.5 mg tablet Take 1-2 tablets (12.5-25 mg total) by mouth 3 (three) times daily as needed for Dizziness. 30 tablet 0     Facility-Administered Encounter Medications as of 11/30/2017   Medication Dose Route Frequency Provider Last Rate Last Dose    denosumab (XGEVA) solution 120 mg  120 mg Subcutaneous 1 time in Clinic/HOD Karrie Vazquez MD         No orders of the defined types were placed in this encounter.    Plan:       Hold xarelto after today.    EBUS on Tuesday for T70 mutation    Forward results to Dr. Vazquez  Simply saline mist to nares  I have explained the risks, benefits and alternatives of the procedure in detail.  The patient voices understanding and all questions have been answered.  The patient agrees to proceed as planned.

## 2017-12-01 ENCOUNTER — TELEPHONE (OUTPATIENT)
Dept: PULMONOLOGY | Facility: CLINIC | Age: 72
End: 2017-12-01

## 2017-12-01 NOTE — TELEPHONE ENCOUNTER
----- Message from Rosario Matt sent at 12/1/2017 11:29 AM CST -----  Contact: Pt  [phone]  Pt  Wants to know , How long would her procedure be on December 5,  Call the pt back to verify

## 2017-12-05 ENCOUNTER — SURGERY (OUTPATIENT)
Age: 72
End: 2017-12-05

## 2017-12-05 ENCOUNTER — ANESTHESIA (OUTPATIENT)
Dept: SURGERY | Facility: HOSPITAL | Age: 72
End: 2017-12-05
Payer: MEDICARE

## 2017-12-05 ENCOUNTER — ANESTHESIA EVENT (OUTPATIENT)
Dept: SURGERY | Facility: HOSPITAL | Age: 72
End: 2017-12-05
Payer: MEDICARE

## 2017-12-05 ENCOUNTER — HOSPITAL ENCOUNTER (OUTPATIENT)
Facility: HOSPITAL | Age: 72
Discharge: HOME | End: 2017-12-05
Attending: INTERNAL MEDICINE | Admitting: INTERNAL MEDICINE
Payer: MEDICARE

## 2017-12-05 VITALS
RESPIRATION RATE: 24 BRPM | DIASTOLIC BLOOD PRESSURE: 70 MMHG | OXYGEN SATURATION: 96 % | HEIGHT: 65 IN | SYSTOLIC BLOOD PRESSURE: 123 MMHG | HEART RATE: 77 BPM | TEMPERATURE: 98 F | WEIGHT: 133 LBS | BODY MASS INDEX: 22.16 KG/M2

## 2017-12-05 DIAGNOSIS — C34.90 EGFR-RELATED LUNG CANCER: ICD-10-CM

## 2017-12-05 DIAGNOSIS — R59.0 MEDIASTINAL ADENOPATHY: ICD-10-CM

## 2017-12-05 DIAGNOSIS — C34.91 ADENOCARCINOMA OF LUNG, RIGHT: ICD-10-CM

## 2017-12-05 PROCEDURE — 88173 CYTOPATH EVAL FNA REPORT: CPT | Mod: 26,,, | Performed by: PATHOLOGY

## 2017-12-05 PROCEDURE — D9220A PRA ANESTHESIA: Mod: ,,, | Performed by: ANESTHESIOLOGY

## 2017-12-05 PROCEDURE — 37000008 HC ANESTHESIA 1ST 15 MINUTES: Performed by: INTERNAL MEDICINE

## 2017-12-05 PROCEDURE — 63600175 PHARM REV CODE 636 W HCPCS: Performed by: NURSE ANESTHETIST, CERTIFIED REGISTERED

## 2017-12-05 PROCEDURE — 27800903 OPTIME MED/SURG SUP & DEVICES OTHER IMPLANTS: Performed by: INTERNAL MEDICINE

## 2017-12-05 PROCEDURE — 88172 CYTP DX EVAL FNA 1ST EA SITE: CPT | Mod: 26,,, | Performed by: PATHOLOGY

## 2017-12-05 PROCEDURE — 31652 BRONCH EBUS SAMPLNG 1/2 NODE: CPT | Mod: ,,, | Performed by: INTERNAL MEDICINE

## 2017-12-05 PROCEDURE — 25000003 PHARM REV CODE 250: Performed by: NURSE ANESTHETIST, CERTIFIED REGISTERED

## 2017-12-05 PROCEDURE — 71000044 HC DOSC ROUTINE RECOVERY FIRST HOUR: Performed by: INTERNAL MEDICINE

## 2017-12-05 PROCEDURE — 36000707: Performed by: INTERNAL MEDICINE

## 2017-12-05 PROCEDURE — 27201423 OPTIME MED/SURG SUP & DEVICES STERILE SUPPLY: Performed by: INTERNAL MEDICINE

## 2017-12-05 PROCEDURE — 36000706: Performed by: INTERNAL MEDICINE

## 2017-12-05 PROCEDURE — 25000003 PHARM REV CODE 250: Performed by: INTERNAL MEDICINE

## 2017-12-05 PROCEDURE — 71000015 HC POSTOP RECOV 1ST HR: Performed by: INTERNAL MEDICINE

## 2017-12-05 PROCEDURE — 88305 TISSUE EXAM BY PATHOLOGIST: CPT | Performed by: PATHOLOGY

## 2017-12-05 PROCEDURE — 37000009 HC ANESTHESIA EA ADD 15 MINS: Performed by: INTERNAL MEDICINE

## 2017-12-05 RX ORDER — SODIUM CHLORIDE 9 MG/ML
INJECTION, SOLUTION INTRAVENOUS CONTINUOUS
Status: DISCONTINUED | OUTPATIENT
Start: 2017-12-05 | End: 2017-12-05 | Stop reason: HOSPADM

## 2017-12-05 RX ORDER — LIDOCAINE HYDROCHLORIDE 10 MG/ML
INJECTION, SOLUTION EPIDURAL; INFILTRATION; INTRACAUDAL; PERINEURAL
Status: DISCONTINUED | OUTPATIENT
Start: 2017-12-05 | End: 2017-12-05 | Stop reason: HOSPADM

## 2017-12-05 RX ORDER — ONDANSETRON 2 MG/ML
INJECTION INTRAMUSCULAR; INTRAVENOUS
Status: DISCONTINUED | OUTPATIENT
Start: 2017-12-05 | End: 2017-12-05

## 2017-12-05 RX ORDER — DEXAMETHASONE SODIUM PHOSPHATE 4 MG/ML
INJECTION, SOLUTION INTRA-ARTICULAR; INTRALESIONAL; INTRAMUSCULAR; INTRAVENOUS; SOFT TISSUE
Status: DISCONTINUED | OUTPATIENT
Start: 2017-12-05 | End: 2017-12-05

## 2017-12-05 RX ORDER — FENTANYL CITRATE 50 UG/ML
INJECTION, SOLUTION INTRAMUSCULAR; INTRAVENOUS
Status: DISCONTINUED | OUTPATIENT
Start: 2017-12-05 | End: 2017-12-05

## 2017-12-05 RX ORDER — PROPOFOL 10 MG/ML
INJECTION, EMULSION INTRAVENOUS
Status: DISCONTINUED | OUTPATIENT
Start: 2017-12-05 | End: 2017-12-05

## 2017-12-05 RX ORDER — PROPOFOL 10 MG/ML
VIAL (ML) INTRAVENOUS CONTINUOUS PRN
Status: DISCONTINUED | OUTPATIENT
Start: 2017-12-05 | End: 2017-12-05

## 2017-12-05 RX ORDER — LIDOCAINE HYDROCHLORIDE 10 MG/ML
1 INJECTION, SOLUTION EPIDURAL; INFILTRATION; INTRACAUDAL; PERINEURAL ONCE
Status: COMPLETED | OUTPATIENT
Start: 2017-12-05 | End: 2017-12-05

## 2017-12-05 RX ORDER — METOCLOPRAMIDE HYDROCHLORIDE 5 MG/ML
10 INJECTION INTRAMUSCULAR; INTRAVENOUS EVERY 10 MIN PRN
Status: DISCONTINUED | OUTPATIENT
Start: 2017-12-05 | End: 2017-12-05 | Stop reason: HOSPADM

## 2017-12-05 RX ORDER — PROPOFOL 10 MG/ML
VIAL (ML) INTRAVENOUS
Status: DISCONTINUED | OUTPATIENT
Start: 2017-12-05 | End: 2017-12-05

## 2017-12-05 RX ORDER — MIDAZOLAM HYDROCHLORIDE 1 MG/ML
INJECTION, SOLUTION INTRAMUSCULAR; INTRAVENOUS
Status: DISCONTINUED | OUTPATIENT
Start: 2017-12-05 | End: 2017-12-05

## 2017-12-05 RX ORDER — LIDOCAINE HCL/PF 100 MG/5ML
SYRINGE (ML) INTRAVENOUS
Status: DISCONTINUED | OUTPATIENT
Start: 2017-12-05 | End: 2017-12-05

## 2017-12-05 RX ORDER — GLYCOPYRROLATE 0.2 MG/ML
INJECTION INTRAMUSCULAR; INTRAVENOUS
Status: DISCONTINUED | OUTPATIENT
Start: 2017-12-05 | End: 2017-12-05

## 2017-12-05 RX ADMIN — MIDAZOLAM HYDROCHLORIDE 1 MG: 1 INJECTION, SOLUTION INTRAMUSCULAR; INTRAVENOUS at 11:12

## 2017-12-05 RX ADMIN — DEXAMETHASONE SODIUM PHOSPHATE 4 MG: 4 INJECTION, SOLUTION INTRAMUSCULAR; INTRAVENOUS at 11:12

## 2017-12-05 RX ADMIN — PROPOFOL 10 MG: 10 INJECTION, EMULSION INTRAVENOUS at 12:12

## 2017-12-05 RX ADMIN — LIDOCAINE HYDROCHLORIDE 0.1 MG: 10 INJECTION, SOLUTION EPIDURAL; INFILTRATION; INTRACAUDAL; PERINEURAL at 09:12

## 2017-12-05 RX ADMIN — ONDANSETRON 4 MG: 2 INJECTION INTRAMUSCULAR; INTRAVENOUS at 11:12

## 2017-12-05 RX ADMIN — GLYCOPYRROLATE 0.1 MG: 0.2 INJECTION, SOLUTION INTRAMUSCULAR; INTRAVENOUS at 11:12

## 2017-12-05 RX ADMIN — FENTANYL CITRATE 25 MCG: 50 INJECTION, SOLUTION INTRAMUSCULAR; INTRAVENOUS at 11:12

## 2017-12-05 RX ADMIN — PROPOFOL 100 MG: 10 INJECTION, EMULSION INTRAVENOUS at 11:12

## 2017-12-05 RX ADMIN — LIDOCAINE HYDROCHLORIDE 4 ML: 10 INJECTION, SOLUTION EPIDURAL; INFILTRATION; INTRACAUDAL; PERINEURAL at 11:12

## 2017-12-05 RX ADMIN — FENTANYL CITRATE 25 MCG: 50 INJECTION, SOLUTION INTRAMUSCULAR; INTRAVENOUS at 12:12

## 2017-12-05 RX ADMIN — PROPOFOL 200 MCG/KG/MIN: 10 INJECTION, EMULSION INTRAVENOUS at 11:12

## 2017-12-05 RX ADMIN — SODIUM CHLORIDE: 0.9 INJECTION, SOLUTION INTRAVENOUS at 09:12

## 2017-12-05 RX ADMIN — LIDOCAINE HYDROCHLORIDE 80 MG: 20 INJECTION, SOLUTION INTRAVENOUS at 11:12

## 2017-12-05 NOTE — INTERVAL H&P NOTE
The patient has been examined and the H&P has been reviewed:    I concur with the findings and no changes have occurred since H&P was written.    Anesthesia/Surgery risks, benefits and alternative options discussed and understood by patient/family.          Active Hospital Problems    Diagnosis  POA    *Malignant neoplasm of lower lobe of right lung [C34.31]  Yes      Resolved Hospital Problems    Diagnosis Date Resolved POA   No resolved problems to display.       I have explained the risks, benefits and alternatives of the procedure in detail.  The patient voices understanding and all questions have been answered.  The patient agrees to proceed as planned. I have explained the risks, benefits and alternatives of the procedure in detail.  The patient voices understanding and all questions have been answered.  The patient agrees to proceed as planned.

## 2017-12-05 NOTE — DISCHARGE SUMMARY
[facility]  Pulmonology  Discharge Summary      Patient Name: Naty St  MRN: [id number]  Admission Date: 12/5/2017  Hospital Length of Stay: 0 days  Discharge Date and Time:  12/05/2017 12:28 PM  Attending Physician: Kiya Zhang MD   Discharging Provider: Kiya Zhang MD  Primary Care Provider: Olvin Mars MD    HPI: Patient with stage IV adenocarcinoma EGFR positive with progression.  Needs T70M mutation for treatment plannikng    Procedure(s) (LRB):  ENDOBRONCHIAL ULTRASOUND (EBUS) (N/A)  BRONCHOSCOPY (N/A)    Indwelling Lines/Drains at Time of Discharge:   Lines/Drains/Airways          No matching active lines, drains, or airways          Hospital Course: Bronchoscopy complete, see note        Significant Labs:  All pertinent labs within the past 24 hours have been reviewed.    Significant Imaging:  I have reviewed all pertinent imaging results/findings within the past 24 hours.    Pending Diagnostic Studies:     None        Final Active Diagnoses:    Diagnosis Date Noted POA    PRINCIPAL PROBLEM:  Malignant neoplasm of lower lobe of right lung [C34.31] 03/17/2016 Yes    Adenocarcinoma of lung, right [C34.91] 12/05/2017 Yes      Problems Resolved During this Admission:    Diagnosis Date Noted Date Resolved POA       Discharged Condition: stable    Disposition:     Follow Up:  Follow-up Information     Call Karrie Vazquez MD.    Specialties:  Hematology and Oncology, Hematology  Contact information:  [address] [place] [id number]  [phone]                 Patient Instructions:   No discharge procedures on file.  Medications:  Reconciled Home Medications:   Current Discharge Medication List      CONTINUE these medications which have NOT CHANGED    Details   amitriptyline (ELAVIL) 50 MG tablet Take 1 tablet (50 mg total) by mouth every evening.  Qty: 30 tablet, Refills: 2    Comments: This prescription was filled today(12/27/2016). Any refills authorized will  be placed on file.  Associated Diagnoses: Depression, unspecified depression type      amLODIPine (NORVASC) 5 MG tablet Take 1 tablet (5 mg total) by mouth once daily.  Qty: 30 tablet, Refills: 11      atorvastatin (LIPITOR) 40 MG tablet Take 1 tablet (40 mg total) by mouth once daily.  Qty: 90 tablet, Refills: 3      calcium citrate 250 mg calcium Tab Take 750 mg by mouth 3 (three) times daily.  Refills: 0      clindamycin phosphate 1% (CLINDAGEL) 1 % gel Apply topically 2 (two) times daily.  Qty: 60 g, Refills: 6    Associated Diagnoses: Acneiform drug eruption      CREON CpDR TAKE ONE CAPSULE BY MOUTH THREE TIMES A DAY WITH MEALS  Qty: 270 capsule, Refills: 3      denosumab (XGEVA) 120 mg/1.7 mL (70 mg/mL) Soln Inject 120 mg into the skin every 28 days.      dronabinol (MARINOL) 5 MG capsule Take 1 capsule (5 mg total) by mouth 2 (two) times daily before meals.  Qty: 60 capsule, Refills: 3    Associated Diagnoses: Malignant neoplasm of lower lobe of right lung; Anorexia      erlotinib (TARCEVA) 150 MG tablet Take 1 tablet (150 mg total) by mouth once daily.  Qty: 60 tablet, Refills: 12    Associated Diagnoses: Malignant neoplasm of lower lobe of right lung      levetiracetam (KEPPRA) 750 MG Tab Take 1 tablet (750 mg total) by mouth 2 (two) times daily.  Qty: 60 tablet, Refills: 3    Associated Diagnoses: Secondary adenocarcinoma of brain      LORazepam (ATIVAN) 1 MG tablet TAKE ONE TABLET BY MOUTH TWICE DAILY  Qty: 60 tablet, Refills: 1    Comments: This prescription was filled on 10/6/2017. Any refills authorized will be placed on file.      magnesium L-lactate (MAGTAB) 84 mg TbSR Take 1 tablet (84 mg total) by mouth once daily. Then one tablet twice weekly thereafter  Qty: 14 tablet, Refills: 1    Associated Diagnoses: Hypomagnesemia      meclizine (ANTIVERT) 12.5 mg tablet Take 1-2 tablets (12.5-25 mg total) by mouth 3 (three) times daily as needed for Dizziness.  Qty: 30 tablet, Refills: 0      metoprolol  succinate (TOPROL-XL) 50 MG 24 hr tablet Take 1 tablet (50 mg total) by mouth once daily.  Qty: 30 tablet, Refills: 11      mirabegron 50 mg Tb24 Take 1 tablet (50 mg total) by mouth once daily.  Qty: 30 tablet, Refills: 11    Associated Diagnoses: OAB (overactive bladder)      multivitamin (THERAGRAN) per tablet Take 1 tablet by mouth once daily.      rivaroxaban (XARELTO) 20 mg Tab Take 1 tablet (20 mg total) by mouth daily with dinner or evening meal.  Qty: 30 tablet, Refills: 11    Associated Diagnoses: Acute deep vein thrombosis (DVT) of both lower extremities, unspecified vein      sodium bicarbonate 650 MG tablet Take 1 tablet (650 mg total) by mouth 3 (three) times daily. Increase dose to 2 60 mg tabs by mouth three times daily.  Qty: 270 tablet, Refills: 11      vitamin D 1000 units Tab Take 1,000 Units by mouth once daily.             Kiya Zhang MD  Pulmonology  [facility]

## 2017-12-05 NOTE — DISCHARGE INSTRUCTIONS
Youve been told you need an endoscopic procedure to diagnose a problem in your chest or lung. This procedure allows your healthcare provider to view the airway of your lungs and take a tissue sample (biopsy) or treat a lung condition, if needed.     With bronchoscopy, a flexible scope allows the healthcare provider to view and biopsy the airway.     Bronchoscopy  A bronchoscopy allows the healthcare provider to look directly into the airways in your lungs. This is done using a bronchoscope. A bronchoscope is a thin, flexible, hollow, lighted tube that lets the doctor see inside the lung. Tools can be passed down the middle of the scope.    Endobronchial ultrasound  Endobronchial ultrasound (EBUS) is a type of bronchoscopy. With EBUS, the lungs and the space between the lungs (mediastinum) are looked at using a flexible bronchoscope and ultrasound (images created using sound waves). Ultrasound guides the healthcare provider and allows him or her to see through the airway walls.    Preparing for the procedure  Before your procedure, do the following:  · Follow your healthcare providers instructions about eating and drinking.  · Tell your healthcare provider about the medicines you take. You may need to stop taking some of them before the procedure, especially aspirin, Coumadin, or other blood thinners.  · Talk with your healthcare provider about any allergies and health problems you have.  · Tell your healthcare provider if you are pregnant.    During the procedure  You will get medicine through an intravenous (IV) line to help you relax and sleep during the procedure. You may also receive local anesthesia (numbing medicine) with a needle. Then a special spray is used to numb your throat and nose or mouth. This is to help keep you comfortable and prevent coughing during the procedure.    After the procedure  You are sent to the recovery room until the sedation wears off. This takes about 1 to 2 hours. Once you are  fully awake, you can go home. You will need an adult family member or friend to drive you home. Your throat will be sore for a day or two. At first, there may be a small amount of blood in your sputum. This is normal. It should go away after the second day.    Risks and complications  · Bleeding  · Infection  · Injury to vocal cords  · Pneumothorax (collapsed lung)   When to call your healthcare provider  · Large amounts of blood in sputum  · Blood in sputum after 2 days  · Shortness of breath  · Chest pain  · Fever of 100.4ºF (38°C) or higher, or as directed by your healthcare provider  · Hoarseness that wont go away

## 2017-12-05 NOTE — ANESTHESIA POSTPROCEDURE EVALUATION
"Anesthesia Post Evaluation    Patient: Naty St    Procedure(s) Performed: Procedure(s) (LRB):  ENDOBRONCHIAL ULTRASOUND (EBUS) (N/A)  BRONCHOSCOPY (N/A)    Final Anesthesia Type: general  Patient location during evaluation: PACU  Patient participation: Yes- Able to Participate  Level of consciousness: awake and alert and oriented  Post-procedure vital signs: reviewed and stable  Pain management: adequate  Airway patency: patent  PONV status at discharge: No PONV  Anesthetic complications: no      Cardiovascular status: stable  Respiratory status: unassisted, spontaneous ventilation and room air  Hydration status: euvolemic  Follow-up not needed.        Visit Vitals  /70   Pulse 77   Temp 36.6 °C (97.9 °F) (Temporal)   Resp (!) 24   Ht 5' 5" (1.651 m)   Wt 60.3 kg (133 lb)   LMP  (LMP Unknown)   SpO2 96%   Breastfeeding? No   BMI 22.13 kg/m²       Pain/Melissa Score: Pain Assessment Performed: Yes (12/5/2017  1:30 PM)  Presence of Pain: denies (12/5/2017  1:30 PM)  Pain Rating Prior to Med Admin: 0 (12/5/2017  9:15 AM)  Pain Rating Post Med Admin: 0 (12/5/2017  9:15 AM)  Melissa Score: 10 (12/5/2017  1:30 PM)      "

## 2017-12-05 NOTE — TRANSFER OF CARE
"Anesthesia Transfer of Care Note    Patient: Naty St    Procedure(s) Performed: Procedure(s) (LRB):  ENDOBRONCHIAL ULTRASOUND (EBUS) (N/A)  BRONCHOSCOPY (N/A)    Patient location: [facility]    Anesthesia Type: general    Transport from OR: Transported from OR on 6-10 L/min O2 by face mask with adequate spontaneous ventilation    Post pain: adequate analgesia    Post assessment: no apparent anesthetic complications    Post vital signs: stable    Level of consciousness: awake and sedated    Nausea/Vomiting: no nausea/vomiting    Complications: none    Transfer of care protocol was followed      Last vitals:   Visit Vitals  /60 (BP Location: Right arm)   Pulse 65   Temp 36.6 °C (97.9 °F)   Resp 18   Ht 5' 5" (1.651 m)   Wt 60.3 kg (133 lb)   LMP  (LMP Unknown)   SpO2 99%   Breastfeeding? No   BMI 22.13 kg/m²     "

## 2017-12-05 NOTE — ANESTHESIA PREPROCEDURE EVALUATION
"                                                                                                             12/05/2017  Naty St is a 72 y.o., female with a pre-operative diagnosis of Nodule of right lung [R91.1] who is scheduled for Procedure(s) (LRB):  ENDOBRONCHIAL ULTRASOUND (EBUS) (N/A)  BRONCHOSCOPY (N/A).     Requested anesthesia type: General  Surgeon: Kiya Zhang MD  Allergies:   Review of patient's allergies indicates:   Allergen Reactions    Tobradex [tobramycin-dexamethasone] Swelling    Iodinated contrast- oral and iv dye Hives    Morphine Other (See Comments)     "shaking" and tremors    Latex Rash    Phenytoin sodium extended Other (See Comments) and Rash     Vital Sign Range:    Chronic Medications:   Prescriptions Prior to Admission   Medication Sig Dispense Refill Last Dose    amitriptyline (ELAVIL) 50 MG tablet Take 1 tablet (50 mg total) by mouth every evening. 30 tablet 2 Taking    amLODIPine (NORVASC) 5 MG tablet Take 1 tablet (5 mg total) by mouth once daily. 30 tablet 11 Taking    atorvastatin (LIPITOR) 40 MG tablet Take 1 tablet (40 mg total) by mouth once daily. 90 tablet 3 Taking    calcium citrate 250 mg calcium Tab Take 750 mg by mouth 3 (three) times daily.  0 Taking    clindamycin phosphate 1% (CLINDAGEL) 1 % gel Apply topically 2 (two) times daily. 60 g 6 Taking    CREON CpDR TAKE ONE CAPSULE BY MOUTH THREE TIMES A DAY WITH MEALS 270 capsule 3 Taking    denosumab (XGEVA) 120 mg/1.7 mL (70 mg/mL) Soln Inject 120 mg into the skin every 28 days.   Taking    dronabinol (MARINOL) 5 MG capsule Take 1 capsule (5 mg total) by mouth 2 (two) times daily before meals. 60 capsule 3 Taking    erlotinib (TARCEVA) 150 MG tablet Take 1 tablet (150 mg total) by mouth once daily. 60 tablet 12 Taking    levetiracetam (KEPPRA) 750 MG Tab Take 1 tablet (750 mg total) by mouth 2 (two) times daily. 60 tablet 3 Taking    LORazepam (ATIVAN) 1 MG tablet TAKE ONE TABLET BY " MOUTH TWICE DAILY 60 tablet 1 Taking    magnesium L-lactate (MAGTAB) 84 mg TbSR Take 1 tablet (84 mg total) by mouth once daily. Then one tablet twice weekly thereafter 14 tablet 1 Taking    meclizine (ANTIVERT) 12.5 mg tablet Take 1-2 tablets (12.5-25 mg total) by mouth 3 (three) times daily as needed for Dizziness. 30 tablet 0 Not Taking    metoprolol succinate (TOPROL-XL) 50 MG 24 hr tablet Take 1 tablet (50 mg total) by mouth once daily. 30 tablet 11 Taking    mirabegron 50 mg Tb24 Take 1 tablet (50 mg total) by mouth once daily. 30 tablet 11 Taking    multivitamin (THERAGRAN) per tablet Take 1 tablet by mouth once daily.   Taking    rivaroxaban (XARELTO) 20 mg Tab Take 1 tablet (20 mg total) by mouth daily with dinner or evening meal. 30 tablet 11 Taking    sodium bicarbonate 650 MG tablet Take 1 tablet (650 mg total) by mouth 3 (three) times daily. Increase dose to 2 60 mg tabs by mouth three times daily. 270 tablet 11 Taking    vitamin D 1000 units Tab Take 1,000 Units by mouth once daily.   Taking     Current Medications:   No current facility-administered medications for this encounter.      Facility-Administered Medications Ordered in Other Encounters   Medication Dose Route Frequency Provider Last Rate Last Dose    denosumab (XGEVA) solution 120 mg  120 mg Subcutaneous 1 time in Clinic/HOD Karrie Vazquez MD         Medical History:   Past Medical History:   Diagnosis Date    Anticoagulant long-term use     Blood clot in vein 06/2016    Breast cancer 1994    Cataract     Hypertension     Pancreatic cancer 1998    Posterior capsular opacification, left eye     Psychiatric problem     Seizures 01/25/2016    Stroke     Therapy      .    Anesthesia Evaluation    I have reviewed the Patient Summary Reports.    I have reviewed the Nursing Notes.   I have reviewed the Medications.     Review of Systems  Anesthesia Hx:  No problems with previous Anesthesia  Denies Family Hx of Anesthesia  complications.   Denies Personal Hx of Anesthesia complications.   Cardiovascular:   Hypertension    Renal/:   Chronic Renal Disease    Neurological:   CVA Neuromuscular Disease, Seizures    Psych:   Psychiatric History          Physical Exam  General:  Well nourished    Airway/Jaw/Neck:  Airway Findings: Mouth Opening: Small, but > 3cm Tongue: Normal  General Airway Assessment: Adult  Mallampati: III  Improves to II with phonation.  TM Distance: Normal, at least 6 cm  Jaw/Neck Findings:  Neck ROM: Decreased Lateral Motion, to the right, to the left      Dental:  Dental Findings: Upper Dentures, lower partial dentures   Chest/Lungs:  Chest/Lungs Findings: Clear to auscultation, Normal Respiratory Rate     Heart/Vascular:  Heart Findings: Rate: Normal  Rhythm: Regular Rhythm  Sounds: Normal  Vascular Findings:  Edema Locations: BLE  Edema: +1 or +2        Mental Status:  Mental Status Findings:  Cooperative, Alert and Oriented         Anesthesia Plan  Type of Anesthesia, risks & benefits discussed:  Anesthesia Type:  general, MAC  Patient's Preference: as indicated  Intra-op Monitoring Plan: standard ASA monitors  Intra-op Monitoring Plan Comments:   Post Op Pain Control Plan:   Post Op Pain Control Plan Comments:   Induction:   IV  Beta Blocker:  Patient is on a Beta-Blocker and has received one dose within the past 24 hours (No further documentation required).       Informed Consent: Patient understands risks and agrees with Anesthesia plan.  Questions answered. Anesthesia consent signed with patient.  ASA Score: 3     Day of Surgery Review of History & Physical:  There are no significant changes.  H&P update referred to the provider.     Anesthesia Plan Notes: Risks of dental and eye injury reviewed with patient, agrees to proceed. Reassurance given.        Ready For Surgery From Anesthesia Perspective.

## 2017-12-05 NOTE — ANESTHESIA RELEASE NOTE
"Anesthesia Release from PACU Note    Patient: Naty St    Procedure(s) Performed: Procedure(s) (LRB):  ENDOBRONCHIAL ULTRASOUND (EBUS) (N/A)  BRONCHOSCOPY (N/A)    Anesthesia type: GEN    Post pain: Adequate analgesia reported    Post assessment: no apparent anesthetic complications, tolerated procedure well and no evidence of recall    Post vital signs: /70   Pulse 77   Temp 36.6 °C (97.9 °F) (Temporal)   Resp (!) 24   Ht 5' 5" (1.651 m)   Wt 60.3 kg (133 lb)   LMP  (LMP Unknown)   SpO2 96%   Breastfeeding? No   BMI 22.13 kg/m²     Level of consciousness: awake, alert and oriented    Nausea/Vomiting: no nausea/no vomiting    Complications: none    Airway Patency: patent    Respiratory: unassisted, spontaneous ventilation, room air    Cardiovascular: stable and blood pressure at baseline    Hydration: euvolemic    "

## 2017-12-06 ENCOUNTER — HOSPITAL ENCOUNTER (OUTPATIENT)
Dept: RADIOLOGY | Facility: HOSPITAL | Age: 72
Discharge: HOME | End: 2017-12-06
Attending: NEUROLOGICAL SURGERY | Admitting: INTERNAL MEDICINE
Payer: MEDICARE

## 2017-12-06 DIAGNOSIS — C79.31 METASTATIC CANCER TO BRAIN: ICD-10-CM

## 2017-12-06 DIAGNOSIS — C34.90 MALIGNANT NEOPLASM OF LUNG, UNSPECIFIED LATERALITY, UNSPECIFIED PART OF LUNG: Primary | ICD-10-CM

## 2017-12-06 PROCEDURE — A9585 GADOBUTROL INJECTION: HCPCS | Performed by: NEUROLOGICAL SURGERY

## 2017-12-06 PROCEDURE — 25500020 PHARM REV CODE 255: Performed by: NEUROLOGICAL SURGERY

## 2017-12-06 PROCEDURE — 70553 MRI BRAIN STEM W/O & W/DYE: CPT | Mod: 26,,, | Performed by: RADIOLOGY

## 2017-12-06 PROCEDURE — 70553 MRI BRAIN STEM W/O & W/DYE: CPT | Mod: TC

## 2017-12-06 RX ORDER — GADOBUTROL 604.72 MG/ML
7 INJECTION INTRAVENOUS
Status: COMPLETED | OUTPATIENT
Start: 2017-12-06 | End: 2017-12-06

## 2017-12-06 RX ADMIN — GADOBUTROL 7 ML: 604.72 INJECTION INTRAVENOUS at 05:12

## 2017-12-11 ENCOUNTER — OFFICE VISIT (OUTPATIENT)
Dept: NEUROSURGERY | Facility: CLINIC | Age: 72
End: 2017-12-11
Payer: MEDICARE

## 2017-12-11 VITALS
WEIGHT: 136.13 LBS | BODY MASS INDEX: 22.68 KG/M2 | SYSTOLIC BLOOD PRESSURE: 114 MMHG | DIASTOLIC BLOOD PRESSURE: 68 MMHG | HEART RATE: 63 BPM | HEIGHT: 65 IN | TEMPERATURE: 98 F

## 2017-12-11 DIAGNOSIS — C79.31 METASTATIC CANCER TO BRAIN: Primary | ICD-10-CM

## 2017-12-11 PROCEDURE — 99999 PR PBB SHADOW E&M-EST. PATIENT-LVL III: CPT | Mod: PBBFAC,,, | Performed by: NEUROLOGICAL SURGERY

## 2017-12-11 PROCEDURE — 99213 OFFICE O/P EST LOW 20 MIN: CPT | Mod: S$GLB,,, | Performed by: NEUROLOGICAL SURGERY

## 2017-12-11 NOTE — PROGRESS NOTES
This office note has been dictated.  Naty St was seen in neurosurgical followup at the office this morning.    She had been getting along well, but on 10/08/17 had sudden onset of dizziness,   nausea, vomiting and gait difficulty, and was brought to [facility] where a CT   and subsequent MRI showed a right cerebellar infarct.  She underwent   rehabilitation and has been doing reasonably well since then.  She says she did   take one fall and she is using a cane when she needs it.  Otherwise, she has had   no new neurological symptoms.  She feels her vision is stable.  Her appetite   reasonable.  She has noted no new focal weakness.  A CT PET scan done on   11/09/17 was somewhat suspicious for mild enlargement of the lung lesion and   this is being investigated by Oncology and Pulmonary Medicine.    On examination today, she is awake and speaking well.  Extraocular movements are   good.  She was able to stand up from a chair and walk.  She is a little slow,   but did not lose her balance.  She seems otherwise neurologically intact.    MRI of the brain was done at [facility] today and compared to her earlier   scans and her scans of 10/08/17 when she presented with a stroke.  There is no   change in the resection site of the left frontal metastatic tumor and no clear   evidence for new metastasis.  The infarct in the distribution of the right   superior cerebellar artery is noted.    She will follow up with Oncology.  I will plan to have her return in about six   months with a followup MRI of the brain.      TONY/HN  dd: 12/11/2017 09:25:30 (CST)  td: 12/12/2017 03:17:53 (CST)  Doc ID   #[id number]  Job ID #[id number]    CC: Naty St

## 2017-12-13 ENCOUNTER — TELEPHONE (OUTPATIENT)
Dept: PHARMACY | Facility: CLINIC | Age: 72
End: 2017-12-13

## 2017-12-15 ENCOUNTER — TELEPHONE (OUTPATIENT)
Dept: HEMATOLOGY/ONCOLOGY | Facility: CLINIC | Age: 72
End: 2017-12-15

## 2017-12-15 NOTE — TELEPHONE ENCOUNTER
Spoke with patient.  Informed her dr sullivan sent off for t790m testing on dr amaya's specimen. Results are pending.  Nurse will be in touch once resulted.  Patient voiced understanding.

## 2017-12-15 NOTE — TELEPHONE ENCOUNTER
----- Message from Kim Holt sent at 12/15/2017  9:09 AM CST -----  Contact: Pt  Pt calling to inform  of a procedure she had on 12/5        Pt call back number [phone]

## 2017-12-18 ENCOUNTER — TELEPHONE (OUTPATIENT)
Dept: HEMATOLOGY/ONCOLOGY | Facility: CLINIC | Age: 72
End: 2017-12-18

## 2017-12-18 NOTE — TELEPHONE ENCOUNTER
Hey mildred,  We will see patient once her T709m results are back.  Patient is aware, as I spoke with her last week.  ~wen

## 2017-12-18 NOTE — TELEPHONE ENCOUNTER
----- Message from Lisa Valente MA sent at 12/18/2017 11:57 AM CST -----  Contact: Pt   [phone]  Can this pt be scheduled with Dr Vazquez for her test results.  Thank you, Lisa  ----- Message -----  From: Kiya Zhang MD  Sent: 12/18/2017  11:44 AM  To: Lisa Valente MA    Needs appt with Dr. Vazquez for results.  Kiya    ----- Message -----  From: Lisa Valente MA  Sent: 12/15/2017   8:54 AM  To: Kiya Zhang MD        ----- Message -----  From: Rosario Matt  Sent: 12/15/2017   8:49 AM  To: Vicente Zhang   -   Patient  Calling to get test result  , call the pt back to discuss call  back number  [phone]

## 2017-12-20 ENCOUNTER — TELEPHONE (OUTPATIENT)
Dept: HEMATOLOGY/ONCOLOGY | Facility: CLINIC | Age: 72
End: 2017-12-20

## 2017-12-20 DIAGNOSIS — L03.90 CELLULITIS, UNSPECIFIED CELLULITIS SITE: Primary | ICD-10-CM

## 2017-12-20 RX ORDER — DOXYCYCLINE 100 MG/1
100 CAPSULE ORAL EVERY 12 HOURS
Qty: 30 CAPSULE | Refills: 1 | Status: SHIPPED | OUTPATIENT
Start: 2017-12-20 | End: 2018-02-07 | Stop reason: SDUPTHER

## 2017-12-20 NOTE — TELEPHONE ENCOUNTER
"Spoke with patient, who is experiencing "sores on her scalp."  Patient is requesting doxycycline to be sent to [facility] pharmacy. She doesn't need clinda gel.    Message routed to dr sullivan   "

## 2017-12-20 NOTE — TELEPHONE ENCOUNTER
----- Message from Willie Ramirez sent at 12/20/2017 11:10 AM CST -----  Contact: Son   Will like a call regarding antibiotic for sores in her month. A prescription is needed for the sores.     If son doesn't answer please leave voice message if medication was sent over     Contact::[phone] Ext::[phone]

## 2017-12-20 NOTE — TELEPHONE ENCOUNTER
----- Message from Kim Holt sent at 12/20/2017  3:04 PM CST -----  Contact: Pt son Basil  Pt son calling regarding sores on pt head. He is aware that medication has been called in but he has questions regarding the sores      Basil call back number [phone]

## 2017-12-20 NOTE — TELEPHONE ENCOUNTER
----- Message from Willie Ramirez sent at 12/20/2017  8:39 AM CST -----  Contact: Son  Will like a call from Zandra regarding if pt can still take a particular medication     Contact::[phone] Ext:[phone]

## 2017-12-27 ENCOUNTER — TELEPHONE (OUTPATIENT)
Dept: HEMATOLOGY/ONCOLOGY | Facility: CLINIC | Age: 72
End: 2017-12-27

## 2017-12-27 DIAGNOSIS — C34.90 MALIGNANT NEOPLASM OF LUNG, UNSPECIFIED LATERALITY, UNSPECIFIED PART OF LUNG: Primary | ICD-10-CM

## 2017-12-27 DIAGNOSIS — Z00.6 EXAMINATION OF PARTICIPANT IN CLINICAL TRIAL: ICD-10-CM

## 2017-12-27 NOTE — TELEPHONE ENCOUNTER
----- Message from Kim Holt sent at 12/27/2017  2:59 PM CST -----  Contact: Pt son Basil  Pt son calling with questions regarding pt treatment plan      Basil call back number [phone] ext [phone]

## 2017-12-27 NOTE — TELEPHONE ENCOUNTER
Returned call to patient's son, shirley, who is calling to ask when patient is to receive xgeva next.  Nurse reviewed chart--last xgeva was 11/10/17.     Nurse spoke with path---t790m is still pending. Patient will be brought back into clinic to see dr sullivan for these results.      Nurse informed son she would contact him back after verifying with dr sullivan whether xgeva is to be continued monthly while waiting for t790m results.     Message routed to dr sullivan

## 2017-12-28 ENCOUNTER — INFUSION (OUTPATIENT)
Dept: INFUSION THERAPY | Facility: HOSPITAL | Age: 72
End: 2017-12-28
Attending: INTERNAL MEDICINE
Payer: MEDICARE

## 2017-12-28 DIAGNOSIS — C34.31 MALIGNANT NEOPLASM OF LOWER LOBE OF RIGHT LUNG: Primary | ICD-10-CM

## 2017-12-28 PROCEDURE — 63600175 PHARM REV CODE 636 W HCPCS: Performed by: INTERNAL MEDICINE

## 2017-12-28 PROCEDURE — 96372 THER/PROPH/DIAG INJ SC/IM: CPT

## 2017-12-28 RX ADMIN — DENOSUMAB 120 MG: 120 INJECTION SUBCUTANEOUS at 02:12

## 2018-01-02 ENCOUNTER — TELEPHONE (OUTPATIENT)
Dept: HEMATOLOGY/ONCOLOGY | Facility: CLINIC | Age: 73
End: 2018-01-02

## 2018-01-02 NOTE — TELEPHONE ENCOUNTER
----- Message from Kim Holt sent at 1/2/2018  8:19 AM CST -----  Contact: Pt son Basil  Pt son calling regarding next step in pt treatment plan      Basil call back number [phone]

## 2018-01-02 NOTE — TELEPHONE ENCOUNTER
----- Message from Willie Ramirez sent at 1/2/2018 10:15 AM CST -----  Contact: Basil   Son is returning Zandra's call    Contact::[phone]

## 2018-01-02 NOTE — TELEPHONE ENCOUNTER
Returned call to patient's son.  Explained to son t790m is still pending.  Patient will be brought into clinic as soon as resulted.

## 2018-01-08 ENCOUNTER — TELEPHONE (OUTPATIENT)
Dept: HEMATOLOGY/ONCOLOGY | Facility: CLINIC | Age: 73
End: 2018-01-08

## 2018-01-08 ENCOUNTER — TELEPHONE (OUTPATIENT)
Dept: PHARMACY | Facility: CLINIC | Age: 73
End: 2018-01-08

## 2018-01-08 NOTE — TELEPHONE ENCOUNTER
----- Message from Willie Ramirez sent at 1/8/2018  4:44 PM CST -----  Contact: Renetta Rendon states 1/11 appt is a good day     Contact:[phone]

## 2018-01-08 NOTE — TELEPHONE ENCOUNTER
They have no more tumor left to send repeat testing. Can you schedule her to see me one day this week maybe Thursday or Friday

## 2018-01-08 NOTE — TELEPHONE ENCOUNTER
Spoke with angel in path for update on [id number]-----bkdnajf came back as insufficient tumor--test was unable to be performed.  Guardant was negative.    Dr. Vazquez,  Talk to me about this.  ~wen

## 2018-01-08 NOTE — TELEPHONE ENCOUNTER
----- Message from Kim Holt sent at 1/8/2018 10:03 AM CST -----  Contact: Pt son Basil  Pt son calling regarding results from procedure and has questions about her injections      Basil call back number [phone]

## 2018-01-09 ENCOUNTER — TELEPHONE (OUTPATIENT)
Dept: HEMATOLOGY/ONCOLOGY | Facility: CLINIC | Age: 73
End: 2018-01-09

## 2018-01-09 NOTE — TELEPHONE ENCOUNTER
----- Message from Kim Holt sent at 1/9/2018  9:23 AM CST -----  Contact: Pt son  Pt son calling requesting to speak with you regarding the conversation that was had with his mother       Basil call back number [phone]

## 2018-01-09 NOTE — TELEPHONE ENCOUNTER
Spoke with patient. She will come in today at 345pm today to see dr sullivan.  Patient thanked nurse.

## 2018-01-09 NOTE — TELEPHONE ENCOUNTER
Spoke with patient.   She is wondering why not enough tissue was gathered in the bronch, as her moleculer testing (t790m) could not be run because of insufficient tissue. Patient is very upset and is demanding answers from nurse.      Patient is scheduled to come into clinic on Thursday.      Spoke with dr sullivan---she requested nurse send message to dr amaya to address, as the LN was very tiny--and dr amaya could only get what she could.    Nurse did try to explain this to dilma.    Message routed to dr amaya's office

## 2018-01-09 NOTE — TELEPHONE ENCOUNTER
----- Message from Kim Holt sent at 1/9/2018  1:46 PM CST -----  Contact: Pt  Pt calling stating that she won't be able to make appt this afternoon. But she would like to keep her Thursday appt      Pt call back number [phone]

## 2018-01-09 NOTE — TELEPHONE ENCOUNTER
----- Message from Kim Holt sent at 1/9/2018  8:21 AM CST -----  Contact: Pt son Basil  Pt son calling asking for  to call pt regarding some concerns she is having      Pt contact number [phone]

## 2018-01-09 NOTE — TELEPHONE ENCOUNTER
"Spoke with son again.  Son is requesting dr sullivan to contact patient to discuss what is going on, as patient doesn't want to wait to  Talk to dr sullivan on Thursday in clinic.  Nurse reiterated to son---dr amaya gathered the best specimen she could--and it's unfortunate the testing could not be completed. That is why dr sullivan wants to see her in clinic---to discuss what we need to do next. Son states, "my mom is so upset. Please have dr sullivan call her."      Message routed to dr sullivan   "

## 2018-01-10 ENCOUNTER — PES CALL (OUTPATIENT)
Dept: ADMINISTRATIVE | Facility: CLINIC | Age: 73
End: 2018-01-10

## 2018-01-11 ENCOUNTER — OFFICE VISIT (OUTPATIENT)
Dept: NEPHROLOGY | Facility: CLINIC | Age: 73
End: 2018-01-11
Payer: MEDICARE

## 2018-01-11 ENCOUNTER — OFFICE VISIT (OUTPATIENT)
Dept: HEMATOLOGY/ONCOLOGY | Facility: CLINIC | Age: 73
End: 2018-01-11
Payer: MEDICARE

## 2018-01-11 ENCOUNTER — TELEPHONE (OUTPATIENT)
Dept: NEPHROLOGY | Facility: CLINIC | Age: 73
End: 2018-01-11

## 2018-01-11 VITALS
BODY MASS INDEX: 23.1 KG/M2 | TEMPERATURE: 98 F | RESPIRATION RATE: 18 BRPM | WEIGHT: 143.75 LBS | HEIGHT: 66 IN | SYSTOLIC BLOOD PRESSURE: 147 MMHG | HEART RATE: 67 BPM | DIASTOLIC BLOOD PRESSURE: 63 MMHG | OXYGEN SATURATION: 100 %

## 2018-01-11 VITALS
BODY MASS INDEX: 23.91 KG/M2 | OXYGEN SATURATION: 98 % | DIASTOLIC BLOOD PRESSURE: 64 MMHG | HEIGHT: 65 IN | WEIGHT: 143.5 LBS | HEART RATE: 64 BPM | SYSTOLIC BLOOD PRESSURE: 132 MMHG

## 2018-01-11 DIAGNOSIS — D50.0 IRON DEFICIENCY ANEMIA DUE TO CHRONIC BLOOD LOSS: ICD-10-CM

## 2018-01-11 DIAGNOSIS — C79.51 SECONDARY CANCER OF BONE: ICD-10-CM

## 2018-01-11 DIAGNOSIS — E83.42 HYPOMAGNESEMIA: ICD-10-CM

## 2018-01-11 DIAGNOSIS — C34.31 MALIGNANT NEOPLASM OF LOWER LOBE OF RIGHT LUNG: Primary | ICD-10-CM

## 2018-01-11 DIAGNOSIS — D63.1 ANEMIA OF CHRONIC RENAL FAILURE, STAGE 3 (MODERATE): Primary | ICD-10-CM

## 2018-01-11 DIAGNOSIS — N18.30 CKD (CHRONIC KIDNEY DISEASE) STAGE 3, GFR 30-59 ML/MIN: ICD-10-CM

## 2018-01-11 DIAGNOSIS — E55.9 VITAMIN D DEFICIENCY: ICD-10-CM

## 2018-01-11 DIAGNOSIS — N18.30 ANEMIA OF CHRONIC RENAL FAILURE, STAGE 3 (MODERATE): Primary | ICD-10-CM

## 2018-01-11 DIAGNOSIS — C79.31 BRAIN METASTASES: ICD-10-CM

## 2018-01-11 PROCEDURE — 99499 UNLISTED E&M SERVICE: CPT | Mod: S$GLB,,, | Performed by: INTERNAL MEDICINE

## 2018-01-11 PROCEDURE — 99215 OFFICE O/P EST HI 40 MIN: CPT | Mod: S$GLB,,, | Performed by: INTERNAL MEDICINE

## 2018-01-11 PROCEDURE — 99213 OFFICE O/P EST LOW 20 MIN: CPT | Mod: S$GLB,,, | Performed by: INTERNAL MEDICINE

## 2018-01-11 PROCEDURE — 99999 PR PBB SHADOW E&M-EST. PATIENT-LVL III: CPT | Mod: PBBFAC,,, | Performed by: INTERNAL MEDICINE

## 2018-01-11 PROCEDURE — 99999 PR PBB SHADOW E&M-EST. PATIENT-LVL V: CPT | Mod: PBBFAC,,, | Performed by: INTERNAL MEDICINE

## 2018-01-11 RX ORDER — FERROUS SULFATE 325(65) MG
325 TABLET ORAL 2 TIMES DAILY
Qty: 180 TABLET | Refills: 2 | Status: SHIPPED | OUTPATIENT
Start: 2018-01-11 | End: 2018-05-24

## 2018-01-11 RX ORDER — ERGOCALCIFEROL 1.25 MG/1
50000 CAPSULE ORAL
Qty: 12 CAPSULE | Refills: 3 | Status: SHIPPED | OUTPATIENT
Start: 2018-01-11 | End: 2018-05-24

## 2018-01-11 NOTE — PROGRESS NOTES
Subjective:       Patient ID: Naty St is a 72 y.o. 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  She denies, LUTS, SOB, dysuria, hematuria, + peripheral edema now bilaterally, + 4  Since  Increase in serum crt patient has been receiving 1L NS infusion 2 x weekly.    Interval HX:  1/11/18:  10/08/17 had sudden onset of dizziness,   nausea, vomiting and gait difficulty, and was brought to [facility] ER where a CT   and subsequent MRI showed a right cerebellar infarct.  not felt to be related to metastatic disease   Receiving xevga  hbg 8.6 ferritn 13, sat 10  Calcium 8.9, alb 2.6  Taking mag once daily    11/2107 pet scan:  In this patient with history of lung cancer, there is interval increase in size and hypermetabolism of a right upper lobe lung lesion concerning for recurrent disease. Additionally, there is interval appearance of a hypermetabolic paratracheal lymph node suspicious for metastatic disease.      Serum creatinine stable at 1.6-1.7  Renal US  R lower pole non-obstructing stone, otherwise no acute findings   Serologies wnl  cpk wnl  Not on oral magnesium checked was 1.6 serum potassium is 3.4 currently  UPRCT none found on this exam  24 hr stone profile demonstrated hyperoxaluria 85 mg/dl and hypocitraturia, she claims she is following a low oxalate diet and not eating peanut butter,   She occassionally has watery diarrhea, pedal edema increases with dependency, Her bp at home is about 120/70.  No LUTs dysuria, fevers or SOB.But she is drinking less,and has had yellow  "stools for at least a month.  Now taking sodium bicarbonate serum CO2 23      Review of Systems   Constitutional: Positive for fatigue and unexpected weight change. Negative for appetite change, chills and fever.        30 lb weight loss since 9/2016   HENT: Negative for congestion, facial swelling, hearing loss, nosebleeds and trouble swallowing.    Eyes: Negative for pain, discharge, redness and visual disturbance.   Respiratory: Negative for cough, chest tightness, shortness of breath and wheezing.    Cardiovascular: Positive for leg swelling. Negative for chest pain and palpitations.        Trace to +1 ankle edema, improved   Gastrointestinal: Negative for abdominal pain, constipation, diarrhea, nausea and vomiting.   Endocrine: Negative for cold intolerance, heat intolerance and polydipsia.   Genitourinary: Negative for decreased urine volume, difficulty urinating, dysuria, flank pain, hematuria and urgency.   Musculoskeletal: Negative for arthralgias, back pain, joint swelling and myalgias.   Skin: Negative for color change, pallor, rash and wound.   Neurological: Negative for dizziness, tremors, seizures, syncope, speech difficulty, weakness and headaches.   Hematological: Negative for adenopathy. Does not bruise/bleed easily.   Psychiatric/Behavioral: Negative for agitation, behavioral problems, dysphoric mood, self-injury and sleep disturbance.       Objective:     Blood pressure 132/64, pulse 64, height 5' 5" (1.651 m), weight 65.1 kg (143 lb 8.3 oz), SpO2 98 %.      Physical Exam   Constitutional: She is oriented to person, place, and time. She appears well-developed and well-nourished. No distress.   Chronically ill, swallow complexion, NAD, appearing more tired now   HENT:   Head: Normocephalic and atraumatic.   Mouth/Throat: No oropharyngeal exudate.   Eyes: Conjunctivae and EOM are normal. Pupils are equal, round, and reactive to light. Right eye exhibits no discharge. Left eye exhibits no discharge. " No scleral icterus.   Neck: Normal range of motion. Neck supple. No JVD present. No tracheal deviation present. No thyromegaly present.   Cardiovascular: Normal rate, regular rhythm and normal heart sounds.  Exam reveals no gallop.    No murmur heard.  Trace to +1 bilateral nonpitting edema, unable to assess pulses   Pulmonary/Chest: Effort normal and breath sounds normal. No stridor. No respiratory distress. She has no wheezes. She has no rales. She exhibits no tenderness.   Abdominal: Soft. Bowel sounds are normal. She exhibits no distension and no mass. There is no tenderness. There is no rebound and no guarding. No hernia.   Bladder not palpable, just above umbilicus 0.5 cm firm mobile mass that is more palpable when supine but not when sitting upright.   Musculoskeletal: She exhibits no edema or tenderness.   Lymphadenopathy:     She has no cervical adenopathy.   Neurological: She is alert and oriented to person, place, and time. She exhibits normal muscle tone.   Skin: Skin is warm and dry. No rash noted. She is not diaphoretic. No erythema.   Psychiatric: She has a normal mood and affect. Judgment and thought content normal.   Nursing note and vitals reviewed.      Assessment:       No diagnosis found.    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     STEFAN superimposed on CKD. STEFAN may be related to use of Epidermal GF inhibitor, dehydration.  Now resolved   normal serologies, UPRCT with 100 to 250 mg protein,      Diarrhea,  resolved    Hypomagnesemia:  Likely related to Tarceva.  Patient is taking daily supplement   Will check level today.  Serum magnesium should be followed regularly while on tarceva.  Continue daily supplement    CKD stage 3 borderline 4 slowly progressing likely related to longstanding  HTN, nephrosclerosis with smoking history, possible reduced renal mass from scarring/ obstruction with nephrolithiasis and UTIs,  Stable function now.    Nephrolithiasis: hyperoxaluria, low oxalate diet, calcium citrate ordered, and d/c calcium carbonatewoul encourage Po fluid  2 L daily     HTN: stable    Peripheral edema: Improved    Lab Results   Component Value Date    CREATININE 1.7 (H) 12/28/2017    CREATININE 1.7 (H) 12/28/2017     Protein Creatinine Ratios: continue to follow  Prot/Creat Ratio, Ur   Date Value Ref Range Status   12/28/2017 Unable to calculate 0.00 - 0.20 Final   07/03/2017 Unable to calculate 0.00 - 0.20 Final   02/13/2017 0.24 (H) 0.00 - 0.20 Final       2. Metabolic Acidosis:  sodium bicarb 650 mg bid   Lab Results   Component Value Date     12/28/2017     12/28/2017    K 4.4 12/28/2017    K 4.4 12/28/2017    CO2 24 12/28/2017    CO2 24 12/28/2017         3. Renal osteodystrophy: PTH elevated, vitamin D.19  Will add    25-hydroxy vitamin D 50,000  thousand units weekly ×8 weeks and then once monthly thereafter  Lab Results   Component Value Date    .0 (H) 12/28/2017    CALCIUM 8.9 12/28/2017    CALCIUM 8.9 12/28/2017    CAION 1.06 11/10/2017    PHOS 3.9 12/28/2017       4. Anemia:  As per hematology we'll order iron level  Lab Results   Component Value Date    HGB 8.6 (L) 12/28/2017        5. HTN: stop amlodipine if bp remains below 110 systolic, consider echo if remains low      Medication: no change      Monitor BP as directed in instructions      7. Weight: Weight: 65.1 kg (143 lb 8.3 oz). she states that her daily weights   From reading flowsheets 30 lb weight loss since 9/2016  Hold on lasix at this time, despite peripheral edema she is  without respiratory symptoms.  Wt  Readings from Last 3 Encounters:   01/11/18 65.1 kg (143 lb 8.3 oz)   12/11/17 61.7 kg (136 lb 1.6 oz)   12/05/17 60.3 kg (133 lb)       8. Lipid management:   Lab Results   Component Value Date    LDLCALC 83.4 10/08/2017         9. Diet:  Education/referral: consider Nepro or BOOST,  benaprotein      Sodium: 2 gm    Potassium:      Phosphorus:      Protein:      Fluid:       10. ESRD planing: not indicated at this time       Education:       Access:    DVT: on lovenox 1.5 mg/kg if gfr drops below 30 cc/min would reduce dose to 1mg/kg    13.  Please avoid or minimize all NSAIDS (ibuprofen, motrin, aleve, indocin, naprosyn) to minimize the risk to your kidneys.      14. Follow up in :4 mo

## 2018-01-11 NOTE — TELEPHONE ENCOUNTER
Spoke with pharmacy verfiyed prescription pt will  take it every 7 days for 8 weeks then once a month there after.----- Message from Giulia Fenton sent at 1/11/2018 12:02 PM CST -----  Contact: Mark  [phone]   AYANA/ [facility]   Pharmacy says they need you to clarify the sig for the Vitamin D.   Pls call today.

## 2018-01-11 NOTE — PATIENT INSTRUCTIONS
Lab in 4 mo     feso4 325 mg twice daily   may need a stool softener such as docusate sodium twice daily as well     add vit d 50,000 units once weekly for 8 weeks and thereafter once a month   stop the vit d supplement you are now taking daily    Increase protein in diet   novasource renal   quest or zone bars   egg white    rtc 4 mo

## 2018-01-11 NOTE — PROGRESS NOTES
"Subjective:       Patient ID: Naty St is a 72 y.o. female.    Chief Complaint: Malignant neoplasm of lower lobe of right lung and dry cough  Oncologic History:  Ms. Naty St was admitted to the hospital between 01/25/2016 and 01/28/2016. She has a history of pancreatic cancer 17 years ago, breast cancer and meningioma, presented to the hospital complaining of loss of consciousness. The patient apparently was in her normal state of health and she stood up to go to the bathroom and fell to the floor. The patient's daughter helped the mother up in the bathroom and noted that her mother's upper extremities were shaking and eye rolling. No reports of bowel or bladder incontinence, tongue biting or rolling. The second episode lasted about four minutes and she was extremely lethargic following that. Apparently, the patient had a meningioma resection done in the past; however, recently, underwent neurosurgical resection with Dr. 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    Additional immunohistochemical stains were performed which revealed the tumor cells to be are positive for TTF1 and negative for ER and GCDFP. The morphology and TTF1 positivity are most consistent with lung primary.    Of note, imaging scan at the end of January 2016 revealed a mass in the lung at 2 cm in the medial aspect of the apical segment of the right upper lobe abutting the mediastinum at the level of the azygous vein and abutting and possibly encasing the segmental bronchi and vessels of the apical segment of the right upper lobe and no pleural fluid was present. Also, there is an enlarged right paratracheal lymph node. No evidence of any metastatic disease at the pancreatic site with postoperative changes post Whipple disease  Her PET Scan from 2/15/16 reveal "Hypermetabolic mass in the right lung apex consistent " "with a primary malignancy. Hypermetabolic mediastinal lymph nodes consistent with metastatic disease. Right sacral hypermetabolic lesion consistent with metastatic disease, noting additional mildly sclerotic lesions in multiple vertebral bodies which do not demonstrate abnormal hypermetabolism  She underwent IR bone biopsy which revealed metastatic adenocarcinoma of lung origin. 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  She has completed focal RT to brain lesions in March 2016.    PET scan from 6/6/16 shows "Dramatic almost complete response to therapy."  Lovenox started after US lower ext 6/7/16 shows Acute complete occlusion of one of the left posterior tibial vein.Remote partial thrombus of the proximal left superficial femoral vein.  She is on Tarceva.   12/6/16 PET scan reveals "Stable right upper lobe lesion and right hilar lymph node. No new lesions identified. Trace left pleural effusion".  1/27/17 Renal u/s "Medical renal disease. Nonobstructive right nephrolithiasis"  1/31/17 PET - "Right upper lobe nodule and right hilar lymph node similar and very low grade activity.  There is no definite evidence of recurrence."   11/9/17 PET "In this patient with history of lung cancer, there is interval increase in size and hypermetabolism of a right upper lobe lung lesion concerning for recurrent disease. Additionally, there is interval appearance of a hypermetabolic paratracheal lymph node suspicious for metastatic disease"             HPI Ms. St returns for follow up. She is tolerating Tarceva well. She underwent EBUS on 12/5/17. Pathology revealed adenocarcinoma but T790m could not be done as quantity was not insufficient. She come sin today for a follow-up. She is accompanied by her  and another son.  She denies any new issues.      Review of Systems   Constitutional: Negative for appetite change, fatigue and unexpected weight change.   HENT: Negative for mouth sores.    Eyes: Negative for visual " disturbance.   Respiratory: Negative for cough and shortness of breath.    Cardiovascular: Negative for chest pain.   Gastrointestinal: Negative for abdominal pain and diarrhea.   Genitourinary: Negative for frequency.   Musculoskeletal: Negative for back pain.   Skin: Negative for rash.   Neurological: Negative for headaches.   Hematological: Negative for adenopathy.   Psychiatric/Behavioral: The patient is not nervous/anxious.    All other systems reviewed and are negative.      Objective:      Physical Exam   Constitutional: She is oriented to person, place, and time. She appears well-developed and well-nourished.   HENT:   Mouth/Throat: No oropharyngeal exudate.   Cardiovascular: Normal rate and normal heart sounds.    Pulmonary/Chest: Effort normal and breath sounds normal. She has no wheezes.   Abdominal: Soft. Bowel sounds are normal. There is no tenderness.   Musculoskeletal: She exhibits no edema or tenderness.   Lymphadenopathy:     She has no cervical adenopathy.   Neurological: She is alert and oriented to person, place, and time. Coordination normal.   Skin: Skin is warm and dry. No rash noted.   Psychiatric: She has a normal mood and affect. Judgment and thought content normal.   Vitals reviewed.      LABS:  WBC   Date Value Ref Range Status   12/28/2017 6.83 3.90 - 12.70 K/uL Final     Hemoglobin   Date Value Ref Range Status   12/28/2017 8.6 (L) 12.0 - 16.0 g/dL Final     POC Hematocrit   Date Value Ref Range Status   01/12/2016 25 (L) 36 - 54 %PCV Final     Hematocrit   Date Value Ref Range Status   12/28/2017 27.7 (L) 37.0 - 48.5 % Final     Platelets   Date Value Ref Range Status   12/28/2017 311 150 - 350 K/uL Final     Gran #   Date Value Ref Range Status   12/28/2017 4.3 1.8 - 7.7 K/uL Final     Gran%   Date Value Ref Range Status   12/28/2017 63.0 38.0 - 73.0 % Final       Chemistry        Component Value Date/Time     12/28/2017 1320     12/28/2017 1320    K 4.4 12/28/2017 1320     K 4.4 12/28/2017 1320     (H) 12/28/2017 1320     (H) 12/28/2017 1320    CO2 24 12/28/2017 1320    CO2 24 12/28/2017 1320    BUN 27 (H) 12/28/2017 1320    BUN 27 (H) 12/28/2017 1320    CREATININE 1.7 (H) 12/28/2017 1320    CREATININE 1.7 (H) 12/28/2017 1320    GLU 88 12/28/2017 1320    GLU 88 12/28/2017 1320        Component Value Date/Time    CALCIUM 8.9 12/28/2017 1320    CALCIUM 8.9 12/28/2017 1320    ALKPHOS 54 (L) 12/28/2017 1320    AST 27 12/28/2017 1320    ALT 18 12/28/2017 1320    BILITOT 1.4 (H) 12/28/2017 1320    ESTGFRAFRICA 34.0 (A) 12/28/2017 1320    ESTGFRAFRICA 34.0 (A) 12/28/2017 1320    EGFRNONAA 29.5 (A) 12/28/2017 1320    EGFRNONAA 29.5 (A) 12/28/2017 1320          Assessment:       1. Malignant neoplasm of lower lobe of right lung    2. Secondary cancer of bone    3. Brain metastases    4. CKD (chronic kidney disease) stage 3, GFR 30-59 ml/min    5. Iron deficiency anemia due to chronic blood loss        Plan:        1,2,3. Reviewed recent work-up and the fact that T790 m could not be done as specimen was scant. She understands that the Lymph node was very small in the first place. We still have to attempt the procedure. The pathology does show adenocarcinoma but not enough for further mutational testing.  Current plan is to obtain PET scan as she is due. If stable she will continue with Tarceva. If any evidence of growth will switch to Tagrisso.    If PET stable, I will call her with results. If progression I will see her back in the clinic to confirm plan.    Above care plan was discussed with patient and accompanying  and another son and all questions were addressed to their satisfaction

## 2018-01-16 ENCOUNTER — TELEPHONE (OUTPATIENT)
Dept: ENDOSCOPY | Facility: HOSPITAL | Age: 73
End: 2018-01-16

## 2018-01-16 NOTE — TELEPHONE ENCOUNTER
Naty St   Female, 72 y.o., 1945  Weight:   65.2 kg (143 lb 11.8 oz)  Phone:   *H:[phone]; M:[phone]; OTHER PHONE:[phone]  PCP:   Olvin Mars MD  Language:   English  Need Interp:   No  Allergies:   Tobradex [Tobramycin-dexamethasone], Iodinated Contrast- Oral And Iv Dye, Morphine, Latex, Phenytoin Sodium Extended  Health Maintenance:   Due  FYI  General  Primary Ins.:   [employer] MA[employer]ICARE  MRN:   [id number]  Pt Comm Pref:   None  Patient Portal:   Active  Next Appt:   01/19/2018  My Sticky Note:         Message   Received: Today   Message Contents   MD Mita Keith RN   Caller: LOLLY Artis RN              Yes for 3 days prior to procedure    Previous Messages      ----- Message -----   From: Mita Artis RN   Sent: 1/16/2018  12:36 PM   To: Karrie Vazquez MD     Mrs. St is trying to schedule her EGD/COLON. Will it be safe for her to stoppp her Xarelto medication? And for how many days?

## 2018-01-17 ENCOUNTER — TELEPHONE (OUTPATIENT)
Dept: HEMATOLOGY/ONCOLOGY | Facility: CLINIC | Age: 73
End: 2018-01-17

## 2018-01-17 NOTE — TELEPHONE ENCOUNTER
spoke with pt on today in regards to rescheduling pet/scan and MD visit to later, pt has confirm new schedule times of appointments.

## 2018-01-17 NOTE — TELEPHONE ENCOUNTER
----- Message from Willie Ramirez sent at 1/17/2018 12:28 PM CST -----  Contact: Son   Will like to cancel and reschedule 1/19 pet scan and appt with      Contact::[phone]

## 2018-01-19 ENCOUNTER — TELEPHONE (OUTPATIENT)
Dept: HEMATOLOGY/ONCOLOGY | Facility: CLINIC | Age: 73
End: 2018-01-19

## 2018-01-19 NOTE — TELEPHONE ENCOUNTER
----- Message from Malini Zelaya sent at 1/19/2018  8:31 AM CST -----  Contact: Pt's Son Basil [phone]  Pt's Son Basil called to speak to the nurse (Zandra) and would like a call back today to discuss the pt's care and upcoming appts.    Mr Gracia can be reached at [phone].    Thanks

## 2018-01-23 DIAGNOSIS — Z12.11 SPECIAL SCREENING FOR MALIGNANT NEOPLASMS, COLON: Primary | ICD-10-CM

## 2018-01-23 RX ORDER — POLYETHYLENE GLYCOL 3350, SODIUM SULFATE ANHYDROUS, SODIUM BICARBONATE, SODIUM CHLORIDE, POTASSIUM CHLORIDE 236; 22.74; 6.74; 5.86; 2.97 G/4L; G/4L; G/4L; G/4L; G/4L
4 POWDER, FOR SOLUTION ORAL ONCE
Qty: 4000 ML | Refills: 0 | Status: SHIPPED | OUTPATIENT
Start: 2018-01-23 | End: 2018-01-23

## 2018-01-24 ENCOUNTER — TELEPHONE (OUTPATIENT)
Dept: HEMATOLOGY/ONCOLOGY | Facility: CLINIC | Age: 73
End: 2018-01-24

## 2018-01-24 NOTE — TELEPHONE ENCOUNTER
----- Message from Kim Holt sent at 1/24/2018  2:21 PM CST -----  Contact: Pt son Basil  Pt son calling with questions regarding injection        Basil call back number [phone]

## 2018-01-30 ENCOUNTER — HOSPITAL ENCOUNTER (OUTPATIENT)
Dept: RADIOLOGY | Facility: HOSPITAL | Age: 73
Discharge: HOME | End: 2018-01-30
Attending: INTERNAL MEDICINE
Payer: MEDICARE

## 2018-01-30 DIAGNOSIS — C34.31 MALIGNANT NEOPLASM OF LOWER LOBE OF RIGHT LUNG: ICD-10-CM

## 2018-01-30 LAB — POCT GLUCOSE: 83 MG/DL (ref 70–110)

## 2018-01-30 PROCEDURE — A9552 F18 FDG: HCPCS

## 2018-01-30 PROCEDURE — 78815 PET IMAGE W/CT SKULL-THIGH: CPT | Mod: 26,PS,, | Performed by: RADIOLOGY

## 2018-01-30 PROCEDURE — 78815 PET IMAGE W/CT SKULL-THIGH: CPT | Mod: TC,PS

## 2018-01-31 DIAGNOSIS — C79.31 SECONDARY ADENOCARCINOMA OF BRAIN: ICD-10-CM

## 2018-01-31 RX ORDER — LEVETIRACETAM 750 MG/1
750 TABLET ORAL 2 TIMES DAILY
Qty: 60 TABLET | Refills: 3 | Status: SHIPPED | OUTPATIENT
Start: 2018-01-31 | End: 2018-06-12 | Stop reason: SDUPTHER

## 2018-01-31 NOTE — TELEPHONE ENCOUNTER
----- Message from Jd Goode sent at 1/31/2018 10:42 AM CST -----  Contact: Patient @ [phone]  Patient needs a refill on (levetiracetam (KEPPRA) 750 MG Tab)     [facility] [address] [address]  Phone: [phone] Fax: [phone]

## 2018-01-31 NOTE — TELEPHONE ENCOUNTER
JESSENIA PT, MED REFILL SENT TO DR ESTRADA FOR APPROVAL. WILL E-SCRIBE TO [facility], PER PTS REQUEST.

## 2018-02-01 ENCOUNTER — OFFICE VISIT (OUTPATIENT)
Dept: HEMATOLOGY/ONCOLOGY | Facility: CLINIC | Age: 73
End: 2018-02-01
Payer: MEDICARE

## 2018-02-01 ENCOUNTER — INFUSION (OUTPATIENT)
Dept: INFUSION THERAPY | Facility: HOSPITAL | Age: 73
End: 2018-02-01
Attending: INTERNAL MEDICINE
Payer: MEDICARE

## 2018-02-01 VITALS
BODY MASS INDEX: 23.03 KG/M2 | TEMPERATURE: 98 F | HEART RATE: 71 BPM | WEIGHT: 143.31 LBS | SYSTOLIC BLOOD PRESSURE: 132 MMHG | DIASTOLIC BLOOD PRESSURE: 60 MMHG | RESPIRATION RATE: 19 BRPM | OXYGEN SATURATION: 96 % | HEIGHT: 66 IN

## 2018-02-01 DIAGNOSIS — C34.91 ADENOCARCINOMA OF LUNG, RIGHT: Primary | ICD-10-CM

## 2018-02-01 DIAGNOSIS — C79.51 SECONDARY CANCER OF BONE: ICD-10-CM

## 2018-02-01 DIAGNOSIS — C79.31 BRAIN METASTASES: ICD-10-CM

## 2018-02-01 DIAGNOSIS — C34.31 MALIGNANT NEOPLASM OF LOWER LOBE OF RIGHT LUNG: Primary | ICD-10-CM

## 2018-02-01 PROCEDURE — 96372 THER/PROPH/DIAG INJ SC/IM: CPT

## 2018-02-01 PROCEDURE — 63600175 PHARM REV CODE 636 W HCPCS: Mod: JG | Performed by: INTERNAL MEDICINE

## 2018-02-01 PROCEDURE — 3008F BODY MASS INDEX DOCD: CPT | Mod: S$GLB,,, | Performed by: INTERNAL MEDICINE

## 2018-02-01 PROCEDURE — 99999 PR PBB SHADOW E&M-EST. PATIENT-LVL V: CPT | Mod: PBBFAC,,, | Performed by: INTERNAL MEDICINE

## 2018-02-01 PROCEDURE — 99215 OFFICE O/P EST HI 40 MIN: CPT | Mod: S$GLB,,, | Performed by: INTERNAL MEDICINE

## 2018-02-01 PROCEDURE — 1159F MED LIST DOCD IN RCRD: CPT | Mod: S$GLB,,, | Performed by: INTERNAL MEDICINE

## 2018-02-01 PROCEDURE — 99499 UNLISTED E&M SERVICE: CPT | Mod: S$GLB,,, | Performed by: INTERNAL MEDICINE

## 2018-02-01 PROCEDURE — 1126F AMNT PAIN NOTED NONE PRSNT: CPT | Mod: S$GLB,,, | Performed by: INTERNAL MEDICINE

## 2018-02-01 RX ADMIN — DENOSUMAB 120 MG: 120 INJECTION SUBCUTANEOUS at 01:02

## 2018-02-01 NOTE — PATIENT INSTRUCTIONS
Osimertinib oral tablets  What is this medicine?  Osimertinib (OH sim ER ti nib) is a medicine that targets proteins in cancer cells and stops the cancer cells from growing. It is used to treat non-small cell lung cancer.  How should I use this medicine?  Take osimertinib tablets by mouth with a glass of water. You can take it with or without food. Follow the directions on the prescription label. Take your medicine at regular intervals. Do not take it more often than directed. Do not stop taking except on your doctor's advice. If swallowing is difficult, you can take the tablet by placing your dose in a container with 2 ounces of cool water only. Stir until the tablet is in small pieces. The tablet will not completely dissolve. Do not crush or chew. Drink the water and the tablet pieces right away. Then add 4 to 8 ounces of water to the same container and drink to make sure you take your full dose.  Talk to your pediatrician regarding the use of this medicine in children. Special care may be needed.  What side effects may I notice from receiving this medicine?  Side effects that you should report to your doctor or health care professional as soon as possible:  · allergic reactions like skin rash, itching or hives, swelling of the face, lips, or tongue  · red spots on the skin  · signs and symptoms of a dangerous change in heartbeat or heart rhythm like chest pain; dizziness; fast or irregular heartbeat; palpitations; feeling faint or lightheaded, falls; breathing problems  · signs and symptoms of increased potassium like muscle weakness; chest pain; or fast, irregular heartbeat  · signs and symptoms of low potassium like muscle cramps or muscle pain; chest pain; dizziness; feeling faint or lightheaded, falls; palpitations; breathing problems; or fast, irregular heartbeat  · swelling of the legs or ankles  · unusually weak or tired  Side effects that usually do not require medical attention (Report these to your  doctor or health care professional if they continue or are bothersome.):  · diarrhea  · dry skin  · nail changes  What may interact with this medicine?  Do not take this medicine with any of the following medications:  · Cisapride  · Dofetlide  · Dronedarone  · Pimozide  · Thioridazine  · ZiprasidoneThis medicine may interact with the following medications:  · certain medicines for seizures like carbamazepine, phenobarbital, phenytoin  · other medicines that prolong the QT interval (cause an abnormal heart rhythm)  · rifampin  · rosuvastatin  · [place]'s Wort  · sulfasalazine  · topotecan  ·   What if I miss a dose?  If you miss a dose, do not make up for the missed dose. Take your next dose at your regular time. Do not take extra or double doses.  Where should I keep my medicine?  Keep out of the reach of children.  Store at room temperature between 20 and 25 degrees C (68 and 77 degrees F). Throw away any unused medicine after the expiration date.  What should I tell my health care provider before I take this medicine?  They need to know if you have any of these conditions:  · heart disease  · history of irregular heartbeat  · history of low levels of calcium, magnesium, or potassium in the blood  · lung or breathing disease, like asthma  · QT prolongation  · scarring or thickening of the lungs  · an unusual or allergic reaction to osimertinib, other medicines, foods, dyes, or preservatives  · pregnant or trying to get pregnant  · breast-feeding  What should I watch for while using this medicine?  Visit your doctor for regular check ups. Report any side effects. Continue your course of treatment unless your doctor tells you to stop. You will need blood work done while you are taking this medicine.  Do not become pregnant while taking this medicine or for 6 weeks after the last dose. Males with female partners of reproductive potential should use effective contraception for 4 months after the last dose. Women  should inform their doctor if they wish to become pregnant or think they might be pregnant. There is a potential for serious side effects to an unborn child. This medicine may interfere with the ability to have a child for both men and women. You should talk with your doctor or health care professional if you are concerned about your fertility. Talk to your health care professional or pharmacist for more information. Do not breast-feed an infant while taking this medicine or for 2 weeks after the last dose.  This drug may make you feel generally unwell. This is not uncommon, as chemotherapy can affect healthy cells as well as cancer cells. Report any side effects. Continue your course of treatment even though you feel ill unless your doctor tells you to stop.  NOTE:This sheet is a summary. It may not cover all possible information. If you have questions about this medicine, talk to your doctor, pharmacist, or health care provider. Copyright© 2017 [employer]

## 2018-02-01 NOTE — PROGRESS NOTES
"Subjective:       Patient ID: Naty St is a 72 y.o. female.    Chief Complaint: Malignant neoplasm of lower lobe of right lung; Rash; and ronald shoulder pain 0/10 now  Oncologic History:  Ms. Naty St was admitted to the hospital between 01/25/2016 and 01/28/2016. She has a history of pancreatic cancer 17 years ago, breast cancer and meningioma, presented to the hospital complaining of loss of consciousness. The patient apparently was in her normal state of health and she stood up to go to the bathroom and fell to the floor. The patient's daughter helped the mother up in the bathroom and noted that her mother's upper extremities were shaking and eye rolling. No reports of bowel or bladder incontinence, tongue biting or rolling. The second episode lasted about four minutes and she was extremely lethargic following that. Apparently, the patient had a meningioma resection done in the past; however, recently, underwent neurosurgical resection with Dr. 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    Additional immunohistochemical stains were performed which revealed the tumor cells to be are positive for TTF1 and negative for ER and GCDFP. The morphology and TTF1 positivity are most consistent with lung primary.    Of note, imaging scan at the end of January 2016 revealed a mass in the lung at 2 cm in the medial aspect of the apical segment of the right upper lobe abutting the mediastinum at the level of the azygous vein and abutting and possibly encasing the segmental bronchi and vessels of the apical segment of the right upper lobe and no pleural fluid was present. Also, there is an enlarged right paratracheal lymph node. No evidence of any metastatic disease at the pancreatic site with postoperative changes post Whipple disease  Her PET Scan from 2/15/16 reveal "Hypermetabolic mass in the " "right lung apex consistent with a primary malignancy. Hypermetabolic mediastinal lymph nodes consistent with metastatic disease. Right sacral hypermetabolic lesion consistent with metastatic disease, noting additional mildly sclerotic lesions in multiple vertebral bodies which do not demonstrate abnormal hypermetabolism  She underwent IR bone biopsy which revealed metastatic adenocarcinoma of lung origin. 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  She has completed focal RT to brain lesions in March 2016.    PET scan from 6/6/16 shows "Dramatic almost complete response to therapy."  Lovenox started after US lower ext 6/7/16 shows Acute complete occlusion of one of the left posterior tibial vein.Remote partial thrombus of the proximal left superficial femoral vein.  She is on Tarceva.   12/6/16 PET scan reveals "Stable right upper lobe lesion and right hilar lymph node. No new lesions identified. Trace left pleural effusion".  1/27/17 Renal u/s "Medical renal disease. Nonobstructive right nephrolithiasis"  1/31/17 PET - "Right upper lobe nodule and right hilar lymph node similar and very low grade activity.  There is no definite evidence of recurrence."   11/9/17 PET "In this patient with history of lung cancer, there is interval increase in size and hypermetabolism of a right upper lobe lung lesion concerning for recurrent disease. Additionally, there is interval appearance of a hypermetabolic paratracheal lymph node suspicious for metastatic disease"                HPI Ms. St returns for follow up. She is tolerating Tarceva well. She underwent EBUS on 12/5/17. Pathology revealed adenocarcinoma but T790m could not be done as quantity was not insufficient. She comes in to review her PET scan from 1/30/18 which reveals The patient's previously identified abnormal lesions is slightly greater uptake of FDG on today's study compared with prior exam. These findings are concerning for progression of disease"    Review " of Systems   Constitutional: Negative for appetite change and unexpected weight change.   Eyes: Positive for visual disturbance.   Respiratory: Positive for cough. Negative for shortness of breath.    Cardiovascular: Negative for chest pain.   Gastrointestinal: Negative for abdominal pain and diarrhea.   Genitourinary: Negative for frequency.   Musculoskeletal: Negative for back pain.   Skin: Positive for rash.   Neurological: Negative for headaches.   Hematological: Negative for adenopathy.       PMFSH: all information reviewed and updated as relevant to today's visit  Objective:      Physical Exam   Constitutional: She is oriented to person, place, and time. She appears well-developed and well-nourished.   HENT:   Mouth/Throat: No oropharyngeal exudate.   Cardiovascular: Normal rate and normal heart sounds.    Pulmonary/Chest: Effort normal and breath sounds normal. She has no wheezes.   Abdominal: Soft. Bowel sounds are normal. There is no tenderness.   Musculoskeletal: She exhibits no edema or tenderness.   Lymphadenopathy:     She has no cervical adenopathy.   Neurological: She is alert and oriented to person, place, and time. Coordination normal.   Skin: Skin is warm and dry. No rash noted.   Psychiatric: She has a normal mood and affect. Judgment and thought content normal.   Vitals reviewed.        LABS:  WBC   Date Value Ref Range Status   02/01/2018 5.52 3.90 - 12.70 K/uL Final     Hemoglobin   Date Value Ref Range Status   02/01/2018 8.6 (L) 12.0 - 16.0 g/dL Final     POC Hematocrit   Date Value Ref Range Status   01/12/2016 25 (L) 36 - 54 %PCV Final     Hematocrit   Date Value Ref Range Status   02/01/2018 28.3 (L) 37.0 - 48.5 % Final     Platelets   Date Value Ref Range Status   02/01/2018 278 150 - 350 K/uL Final     Gran # (ANC)   Date Value Ref Range Status   02/01/2018 3.2 1.8 - 7.7 K/uL Final     Comment:     The ANC is based on a white cell differential from an   automated cell counter. It has not  been microscopically   reviewed for the presence of abnormal cells. Clinical   correlation is required.         Chemistry        Component Value Date/Time     02/01/2018 1250    K 5.1 02/01/2018 1250     (H) 02/01/2018 1250    CO2 18 (L) 02/01/2018 1250    BUN 25 (H) 02/01/2018 1250    CREATININE 1.7 (H) 02/01/2018 1250    GLU 81 02/01/2018 1250        Component Value Date/Time    CALCIUM 8.5 (L) 02/01/2018 1250    ALKPHOS 57 02/01/2018 1250    AST 28 02/01/2018 1250    ALT 22 02/01/2018 1250    BILITOT 1.1 (H) 02/01/2018 1250    ESTGFRAFRICA 34.0 (A) 02/01/2018 1250    EGFRNONAA 29.5 (A) 02/01/2018 1250          Assessment:       1. Adenocarcinoma of lung, right    2. Secondary cancer of bone    3. Brain metastases        Plan:        1,2,3. Reviewed PET scan, the size is the same but there is mildly increased uptake so will switch to Tagrisso especially since Tagrisso covers both T790 m and EGFR mutation  Escribed Tagrisso to [facility]. Once approved, she knows to stop Tarceva for 4-5 days prior to starting Tagrisso. I will see her in clinic after she has been on Tagrisso for 2 weeks.  Rationale and side effects were extensively discussed with her.    Above care plan was discussed with patient and accompanying  and all questions were addressed to their satisfaction '       Distress Screening Results: Psychosocial Distress screening score of Distress Score: 0 noted and reviewed. No intervention indicated.

## 2018-02-02 ENCOUNTER — TELEPHONE (OUTPATIENT)
Dept: PHARMACY | Facility: HOSPITAL | Age: 73
End: 2018-02-02

## 2018-02-02 NOTE — TELEPHONE ENCOUNTER
Informed patient [facility] received a prescription for Tagrisso and it will require a prior authorization with their insurance company. We will update patient of status as more information is received.

## 2018-02-07 DIAGNOSIS — L03.90 CELLULITIS, UNSPECIFIED CELLULITIS SITE: ICD-10-CM

## 2018-02-07 RX ORDER — DOXYCYCLINE 100 MG/1
100 CAPSULE ORAL EVERY 12 HOURS
Qty: 30 CAPSULE | Refills: 1 | Status: ON HOLD | OUTPATIENT
Start: 2018-02-07 | End: 2018-04-16 | Stop reason: HOSPADM

## 2018-02-09 ENCOUNTER — TELEPHONE (OUTPATIENT)
Dept: HEMATOLOGY/ONCOLOGY | Facility: CLINIC | Age: 73
End: 2018-02-09

## 2018-02-09 NOTE — TELEPHONE ENCOUNTER
----- Message from Kim Holt sent at 2/9/2018  2:14 PM CST -----  Contact: Pt son Basil  Pt son calling with questions regarding chemo medication      Basil call back number [phone] ext [phone]

## 2018-02-09 NOTE — TELEPHONE ENCOUNTER
Spoke with son. Informed him patient needs to stop tarceva for 5 days before starting tagrisso. We will see her with labs 2 weeks post starting.  Son thanked nurse.

## 2018-02-09 NOTE — TELEPHONE ENCOUNTER
Initial Tagrisso consult completed on 18.  Tagrisso will be shipped on 18 to arrive at patient's home on  via [employer] between 8am and 8pm. $0.00 copay. Address confirmed. Patient plans to start Tagrisso on 2/15/18. Patient will call to let us know of any changes. Address confirmed. Confirmed 2 patient identifiers - name and . Therapy Appropriate    Patient was counseled on the administration directions for Tagrisso:  · Take 1 tablet 80 mg by mouth once daily with or without food.  Patient plans to take medication in the evening at the same time that she used to take her Tarceva.  ·  May dissolve disperse tablet in 60 mL of noncarbonated water (only), stir until tablet is dispersed into small pieces (will not dissolve completely) and immediately swallow. Rinse container with 120 to 240 mL of water and immediately drink.  ·  For nasogastric administration, disperse the tablet in 15 mL of noncarbonated water; use an additional 15 mL of water to transfer residue to the syringe. Administer the 30 mL of liquid via the nasogastric tube and flush appropriately (with ~30 mL of water). Do not crush, heat, or ultrasonicate during preparation.    Patient was counseled on the following possible side effects  which include, but are not limited to:    · fatigue, dry skin, rash, diarrhea (discussed use of Imodium if diarrhea is an issue.  asked patient to report >4BM per day to MD especially if patient on Imodium), changes in nails (patient currently experiencing this with Tarceva - advised to keep area clean, moisturized and report if signs of infections occur), nausea, loss of appetite (patient's appetite is good at this time and states she takes in 3 full meals a day), vomiting, mouth sores (discussed the use of warm water, salt and baking soda rinses), weakness, back pain, cough, shortness of breath.    · Patient was given Tagrisso Supply pack, hydrocortisone, and Eucerin    DDIs:  Medication list reviewed,   Potential DDI with amitriptyline - risk of QTc prolongation - advised to report chest pain/pressure, irregular heartbeat, pounding in her chest or SOB to MD or seek medical attention immediately.    Patient was given 2 patient education handouts on how to handle oral chemotherapy and specific recommendations- do's and don'ts. Instructed the patient that if they have any remaining oral chemotherapy, not to flush down the toilet or throw away in the trash; the patient or caregiver should return the unused oral chemotherapy to either the clinic or to myself in the Pharmacy where the oral chemotherapy can be disposed of properly.     Patient confirmed understanding. Patient did not have additional questions.   Patient will start Tagrisso on 2/15/18 and stop Tarceva on 2/10/18.. Consultation included: indication; goals of treatment; administration; storage and handling; side effects; how to handle side effects; the importance of compliance; how to handle missed doses; the importance of laboratory monitoring; the importance of keeping all follow up appointments.  Patient understands to report any medication changes to [facility] and provider. All questions answered and addressed to patients satisfaction. I will f/u with her in 1 week from start, [facility] to contact patient in 3 weeks for refills.

## 2018-02-19 ENCOUNTER — TELEPHONE (OUTPATIENT)
Dept: HEMATOLOGY/ONCOLOGY | Facility: CLINIC | Age: 73
End: 2018-02-19

## 2018-02-19 DIAGNOSIS — G89.3 NEOPLASM RELATED PAIN (ACUTE) (CHRONIC): Primary | ICD-10-CM

## 2018-02-19 RX ORDER — TRAMADOL HYDROCHLORIDE 50 MG/1
50 TABLET ORAL EVERY 6 HOURS PRN
Qty: 30 TABLET | Refills: 0 | Status: SHIPPED | OUTPATIENT
Start: 2018-02-19 | End: 2018-12-14

## 2018-02-19 NOTE — TELEPHONE ENCOUNTER
----- Message from Willie Ramirez sent at 2/19/2018 10:40 AM CST -----  Contact: Basil- Son   Basil will like a call from dr sullivan regarding if these symptoms are normal while taking osimertinib (TAGRISSO) 80 mg Tab    Basil states after reading side effects on medication chest pain was not one listed and will like to know if these pains are coming from something else other than the medication    Contact::[phone]

## 2018-02-19 NOTE — TELEPHONE ENCOUNTER
Spoke to son he described what his mother told him, stated that we should call her to discuss the pain with her.    Spoke to patient, she states the pain last night started in her chest and wrapped around to her back and went up to her neck. She states the pain is currently in her neck. She tried tylenol with minimal relief. She is asking for something a little stronger. I suggested Ultram she states that would be fine. Let her know that it is stronger than over the counter medication but not a strong narcotic. She will call with any further needs.

## 2018-02-19 NOTE — TELEPHONE ENCOUNTER
----- Message from Willie Ramirez sent at 2/19/2018  9:22 AM CST -----  Contact: Son-  Will like a call from Zandra regarding pt taking new medication osimertinib (TAGRISSO) 80 mg Tab    State pt is having serious chest pains     Contact::[phone] ext:[phone]

## 2018-02-19 NOTE — TELEPHONE ENCOUNTER
Left VM for son informing him if patient is having severe chest pains she needs to go straight to the ED.  Asked son to contact the clinic back with any questions.

## 2018-02-26 ENCOUNTER — ANESTHESIA EVENT (OUTPATIENT)
Dept: ENDOSCOPY | Facility: HOSPITAL | Age: 73
End: 2018-02-26

## 2018-02-26 ENCOUNTER — SURGERY (OUTPATIENT)
Age: 73
End: 2018-02-26

## 2018-02-26 ENCOUNTER — ANESTHESIA (OUTPATIENT)
Dept: ENDOSCOPY | Facility: HOSPITAL | Age: 73
End: 2018-02-26

## 2018-02-26 DIAGNOSIS — D50.0 IRON DEFICIENCY ANEMIA DUE TO CHRONIC BLOOD LOSS: Primary | ICD-10-CM

## 2018-02-26 NOTE — ANESTHESIA PREPROCEDURE EVALUATION
"                                                                                                             02/26/2018  Naty St is a 72 y.o., female with a pre-operative diagnosis of Iron deficiency anemia due to chronic blood loss [D50.0] who is scheduled for Procedure(s) (LRB):  ESOPHAGOGASTRODUODENOSCOPY (EGD) (N/A)  COLONOSCOPY (N/A).     Requested anesthesia type: General  Surgeon: Alex Carmona MD  Allergies:   Review of patient's allergies indicates:   Allergen Reactions    Tobradex [tobramycin-dexamethasone] Swelling    Iodinated contrast- oral and iv dye Hives    Morphine Other (See Comments)     "shaking" and tremors    Latex Rash    Phenytoin sodium extended Other (See Comments) and Rash     Vital Sign Range:    Chronic Medications:   Prescriptions Prior to Admission   Medication Sig Dispense Refill Last Dose    amitriptyline (ELAVIL) 50 MG tablet Take 1 tablet (50 mg total) by mouth every evening. 30 tablet 2 Taking    amLODIPine (NORVASC) 5 MG tablet Take 1 tablet (5 mg total) by mouth once daily. 30 tablet 11 Taking    atorvastatin (LIPITOR) 40 MG tablet Take 1 tablet (40 mg total) by mouth once daily. 90 tablet 3 Taking    calcium citrate 250 mg calcium Tab Take 750 mg by mouth 3 (three) times daily.  0 Taking    clindamycin phosphate 1% (CLINDAGEL) 1 % gel Apply topically 2 (two) times daily. 60 g 6 Taking    CREON CpDR TAKE ONE CAPSULE BY MOUTH THREE TIMES A DAY WITH MEALS 270 capsule 3 Taking    denosumab (XGEVA) 120 mg/1.7 mL (70 mg/mL) Soln Inject 120 mg into the skin every 28 days.   Taking    doxycycline (VIBRAMYCIN) 100 MG Cap Take 1 capsule (100 mg total) by mouth every 12 (twelve) hours. 30 capsule 1     dronabinol (MARINOL) 5 MG capsule Take 1 capsule (5 mg total) by mouth 2 (two) times daily before meals. 60 capsule 3 Taking    ergocalciferol (ERGOCALCIFEROL) 50,000 unit Cap Take 1 capsule (50,000 Units total) by mouth every 7 days. Take weekly for 8 weeks " and once thereafter 12 capsule 3 Taking    erlotinib (TARCEVA) 150 MG tablet Take 1 tablet (150 mg total) by mouth once daily. 60 tablet 12 Taking    ferrous sulfate 325 mg (65 mg iron) Tab tablet Take 1 tablet (325 mg total) by mouth 2 (two) times daily. 180 tablet 2 Taking    levETIRAcetam (KEPPRA) 750 MG Tab Take 1 tablet (750 mg total) by mouth 2 (two) times daily. 60 tablet 3 Taking    LORazepam (ATIVAN) 1 MG tablet TAKE ONE TABLET BY MOUTH TWICE DAILY 60 tablet 1 Taking    meclizine (ANTIVERT) 12.5 mg tablet Take 1-2 tablets (12.5-25 mg total) by mouth 3 (three) times daily as needed for Dizziness. 30 tablet 0 Taking    metoprolol succinate (TOPROL-XL) 50 MG 24 hr tablet Take 1 tablet (50 mg total) by mouth once daily. 30 tablet 11 Taking    mirabegron 50 mg Tb24 Take 1 tablet (50 mg total) by mouth once daily. 30 tablet 11 Taking    multivitamin (THERAGRAN) per tablet Take 1 tablet by mouth once daily.   Taking    osimertinib (TAGRISSO) 80 mg Tab Take 1 tablet by mouth once daily. 30 tablet 6 Taking    rivaroxaban (XARELTO) 20 mg Tab Take 1 tablet (20 mg total) by mouth daily with dinner or evening meal. 30 tablet 11 Taking    sodium bicarbonate 650 MG tablet Take 1 tablet (650 mg total) by mouth 3 (three) times daily. Increase dose to 2 60 mg tabs by mouth three times daily. 270 tablet 11 Taking    traMADol (ULTRAM) 50 mg tablet Take 1 tablet (50 mg total) by mouth every 6 (six) hours as needed for Pain. 30 tablet 0      Current Medications:   No current facility-administered medications for this encounter.      Facility-Administered Medications Ordered in Other Encounters   Medication Dose Route Frequency Provider Last Rate Last Dose    denosumab (XGEVA) solution 120 mg  120 mg Subcutaneous 1 time in Clinic/HOD Karrie Vazquez MD         Medical History:   Past Medical History:   Diagnosis Date    Anticoagulant long-term use     Blood clot in vein 06/2016    Breast cancer 1994    Cataract      Hypertension     Pancreatic cancer 1998    Posterior capsular opacification, left eye     Psychiatric problem     Seizures 01/25/2016    Stroke     Therapy      .    Pre-op Assessment    I have reviewed the Patient Summary Reports.    I have reviewed the Nursing Notes.   I have reviewed the Medications.     Review of Systems  Anesthesia Hx:  No problems with previous Anesthesia  Denies Family Hx of Anesthesia complications.   Denies Personal Hx of Anesthesia complications.   Cardiovascular:   Hypertension, well controlled    Renal/:   Chronic Renal Disease    Neurological:   CVA Neuromuscular Disease, Seizures    Psych:   Psychiatric History             Anesthesia Plan  Type of Anesthesia, risks & benefits discussed:  Anesthesia Type:  general  Patient's Preference: as indicated.  Intra-op Monitoring Plan: standard ASA monitors  Intra-op Monitoring Plan Comments:   Post Op Pain Control Plan:   Post Op Pain Control Plan Comments:   Induction:   IV  Beta Blocker:  Patient is not currently on a Beta-Blocker (No further documentation required).       Informed Consent: Patient understands risks and agrees with Anesthesia plan.  Questions answered. Anesthesia consent signed with patient.  ASA Score: 3     Day of Surgery Review of History & Physical: I have interviewed and examined the patient. I have reviewed the patient's H&P dated:  There are no significant changes.  H&P update referred to the surgeon.     Anesthesia Plan Notes: Risks of dental and eye injury reviewed with patient, agrees to proceed. Reassurance given.

## 2018-02-27 ENCOUNTER — TELEPHONE (OUTPATIENT)
Dept: PHARMACY | Facility: CLINIC | Age: 73
End: 2018-02-27

## 2018-02-27 NOTE — TELEPHONE ENCOUNTER
Follow-up: Tagrisso     Pt was taking Tarceva and has changed to Tagrisso to better target cancer. She confirmed that she did stop Tarceva 5 days before beginning Tagrisso, and took her first dose of Tagrisso on 2/15 in the evening. Pt has taken oral chemo before and confirmed understanding of handling, administration, and how to handle missed doses. She denies any missed doses at this time.    Reviewed medications; no new allergies or health conditions. Pt has started new pain medication, Ultram, for back/chest pain. No DDI expected. Pt has appt on 3/1 and will discuss with MD about pain.     Pt denies any lightheadedness, SOB, irregular or pounding heartbeat. She denies any diarrhea, nail toxicity, or rash. She has experienced some dry skin and discussed using moisturizer such as Eucerin. Pt had nail toxicity with Tarceva, but has not experienced any with Tagrisso. Has reported feeling a bit of fatigue at times. Pt is still staying active. Informed pt if activity level drastically changes to contact MD.  Patient understands to report any medication changes to [facility] and provider. All questions answered and addressed to patients satisfaction. Will f/u with pt in 3 months.

## 2018-03-01 ENCOUNTER — OFFICE VISIT (OUTPATIENT)
Dept: HEMATOLOGY/ONCOLOGY | Facility: CLINIC | Age: 73
End: 2018-03-01
Payer: MEDICARE

## 2018-03-01 ENCOUNTER — LAB VISIT (OUTPATIENT)
Dept: LAB | Facility: HOSPITAL | Age: 73
End: 2018-03-01
Attending: INTERNAL MEDICINE
Payer: MEDICARE

## 2018-03-01 ENCOUNTER — INFUSION (OUTPATIENT)
Dept: INFUSION THERAPY | Facility: HOSPITAL | Age: 73
End: 2018-03-01
Attending: INTERNAL MEDICINE
Payer: MEDICARE

## 2018-03-01 VITALS
TEMPERATURE: 98 F | HEART RATE: 66 BPM | BODY MASS INDEX: 23.74 KG/M2 | RESPIRATION RATE: 18 BRPM | OXYGEN SATURATION: 99 % | HEIGHT: 66 IN | SYSTOLIC BLOOD PRESSURE: 146 MMHG | DIASTOLIC BLOOD PRESSURE: 64 MMHG | WEIGHT: 147.69 LBS

## 2018-03-01 DIAGNOSIS — C79.31 BRAIN METASTASES: ICD-10-CM

## 2018-03-01 DIAGNOSIS — D50.0 ANEMIA DUE TO CHRONIC BLOOD LOSS: ICD-10-CM

## 2018-03-01 DIAGNOSIS — C34.31 MALIGNANT NEOPLASM OF LOWER LOBE OF RIGHT LUNG: Primary | ICD-10-CM

## 2018-03-01 DIAGNOSIS — C34.31 MALIGNANT NEOPLASM OF LOWER LOBE OF RIGHT LUNG: ICD-10-CM

## 2018-03-01 DIAGNOSIS — C79.51 SECONDARY CANCER OF BONE: ICD-10-CM

## 2018-03-01 LAB
ALBUMIN SERPL BCP-MCNC: 3 G/DL
ALP SERPL-CCNC: 91 U/L
ALT SERPL W/O P-5'-P-CCNC: 38 U/L
ANION GAP SERPL CALC-SCNC: 8 MMOL/L
AST SERPL-CCNC: 40 U/L
BILIRUB SERPL-MCNC: 0.5 MG/DL
BUN SERPL-MCNC: 14 MG/DL
CALCIUM SERPL-MCNC: 8.1 MG/DL
CHLORIDE SERPL-SCNC: 111 MMOL/L
CO2 SERPL-SCNC: 21 MMOL/L
CREAT SERPL-MCNC: 1.6 MG/DL
ERYTHROCYTE [DISTWIDTH] IN BLOOD BY AUTOMATED COUNT: 14.6 %
EST. GFR  (AFRICAN AMERICAN): 36.6 ML/MIN/1.73 M^2
EST. GFR  (NON AFRICAN AMERICAN): 31.7 ML/MIN/1.73 M^2
GLUCOSE SERPL-MCNC: 87 MG/DL
HCT VFR BLD AUTO: 27.7 %
HGB BLD-MCNC: 8.2 G/DL
IMM GRANULOCYTES # BLD AUTO: 0.02 K/UL
MCH RBC QN AUTO: 25.4 PG
MCHC RBC AUTO-ENTMCNC: 29.6 G/DL
MCV RBC AUTO: 86 FL
NEUTROPHILS # BLD AUTO: 2.1 K/UL
PLATELET # BLD AUTO: 226 K/UL
PMV BLD AUTO: 10.1 FL
POTASSIUM SERPL-SCNC: 4 MMOL/L
PROT SERPL-MCNC: 6.3 G/DL
RBC # BLD AUTO: 3.23 M/UL
SODIUM SERPL-SCNC: 140 MMOL/L
WBC # BLD AUTO: 4.23 K/UL

## 2018-03-01 PROCEDURE — 80053 COMPREHEN METABOLIC PANEL: CPT

## 2018-03-01 PROCEDURE — 99214 OFFICE O/P EST MOD 30 MIN: CPT | Mod: S$GLB,,, | Performed by: INTERNAL MEDICINE

## 2018-03-01 PROCEDURE — 3078F DIAST BP <80 MM HG: CPT | Mod: S$GLB,,, | Performed by: INTERNAL MEDICINE

## 2018-03-01 PROCEDURE — 99499 UNLISTED E&M SERVICE: CPT | Mod: S$GLB,,, | Performed by: INTERNAL MEDICINE

## 2018-03-01 PROCEDURE — 99999 PR PBB SHADOW E&M-EST. PATIENT-LVL V: CPT | Mod: PBBFAC,,, | Performed by: INTERNAL MEDICINE

## 2018-03-01 PROCEDURE — 63600175 PHARM REV CODE 636 W HCPCS: Mod: JG | Performed by: INTERNAL MEDICINE

## 2018-03-01 PROCEDURE — 36415 COLL VENOUS BLD VENIPUNCTURE: CPT

## 2018-03-01 PROCEDURE — 85027 COMPLETE CBC AUTOMATED: CPT

## 2018-03-01 PROCEDURE — 96372 THER/PROPH/DIAG INJ SC/IM: CPT

## 2018-03-01 PROCEDURE — 3077F SYST BP >= 140 MM HG: CPT | Mod: S$GLB,,, | Performed by: INTERNAL MEDICINE

## 2018-03-01 RX ADMIN — DENOSUMAB 120 MG: 120 INJECTION SUBCUTANEOUS at 10:03

## 2018-03-01 NOTE — PROGRESS NOTES
"Subjective:       Patient ID: Naty St is a 72 y.o. female.    Chief Complaint: Lung Cancer  Oncologic History:  Ms. Naty St was admitted to the hospital between 01/25/2016 and 01/28/2016. She has a history of pancreatic cancer 17 years ago, breast cancer and meningioma, presented to the hospital complaining of loss of consciousness. The patient apparently was in her normal state of health and she stood up to go to the bathroom and fell to the floor. The patient's daughter helped the mother up in the bathroom and noted that her mother's upper extremities were shaking and eye rolling. No reports of bowel or bladder incontinence, tongue biting or rolling. The second episode lasted about four minutes and she was extremely lethargic following that. Apparently, the patient had a meningioma resection done in the past; however, recently, underwent neurosurgical resection with Dr. 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    Additional immunohistochemical stains were performed which revealed the tumor cells to be are positive for TTF1 and negative for ER and GCDFP. The morphology and TTF1 positivity are most consistent with lung primary.    Of note, imaging scan at the end of January 2016 revealed a mass in the lung at 2 cm in the medial aspect of the apical segment of the right upper lobe abutting the mediastinum at the level of the azygous vein and abutting and possibly encasing the segmental bronchi and vessels of the apical segment of the right upper lobe and no pleural fluid was present. Also, there is an enlarged right paratracheal lymph node. No evidence of any metastatic disease at the pancreatic site with postoperative changes post Whipple disease  Her PET Scan from 2/15/16 reveal "Hypermetabolic mass in the right lung apex consistent with a primary malignancy. Hypermetabolic " "mediastinal lymph nodes consistent with metastatic disease. Right sacral hypermetabolic lesion consistent with metastatic disease, noting additional mildly sclerotic lesions in multiple vertebral bodies which do not demonstrate abnormal hypermetabolism  She underwent IR bone biopsy which revealed metastatic adenocarcinoma of lung origin. 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  She has completed focal RT to brain lesions in March 2016.    PET scan from 6/6/16 shows "Dramatic almost complete response to therapy."  Lovenox started after US lower ext 6/7/16 shows Acute complete occlusion of one of the left posterior tibial vein.Remote partial thrombus of the proximal left superficial femoral vein.  She is on Tarceva.   12/6/16 PET scan reveals "Stable right upper lobe lesion and right hilar lymph node. No new lesions identified. Trace left pleural effusion".  1/27/17 Renal u/s "Medical renal disease. Nonobstructive right nephrolithiasis"  1/31/17 PET - "Right upper lobe nodule and right hilar lymph node similar and very low grade activity.  There is no definite evidence of recurrence."   11/9/17 PET "In this patient with history of lung cancer, there is interval increase in size and hypermetabolism of a right upper lobe lung lesion concerning for recurrent disease. Additionally, there is interval appearance of a hypermetabolic paratracheal lymph node suspicious for metastatic disease"                 She progressed on Tarceva She underwent EBUS on 12/5/17. Pathology revealed adenocarcinoma but T790m could not be done as quantity was not insufficient. Her PET scan from 1/30/18 revealed The patient's previously identified abnormal lesions is slightly greater uptake of FDG on today's study compared with prior exam. These findings are concerning for progression of disease"  Jose is now on Tagrisso and comes in to review labs.    She denies any nausea, vomiting, diarrhea, constipation, abdominal pain, weight loss or " loss of appetite, chest pain, shortness of breath, leg swelling, fatigue, pain, headache, dizziness, or mood changes. Her ECOG PS is zero   She is accompanied by her           Review of Systems   Constitutional: Negative for appetite change, fatigue and unexpected weight change.   HENT: Negative for mouth sores.    Eyes: Negative for visual disturbance.   Respiratory: Negative for cough and shortness of breath.    Cardiovascular: Negative for chest pain.   Gastrointestinal: Negative for abdominal pain and diarrhea.   Genitourinary: Negative for frequency.   Musculoskeletal: Negative for back pain.   Skin: Negative for rash.   Neurological: Negative for headaches.   Hematological: Negative for adenopathy.   Psychiatric/Behavioral: The patient is not nervous/anxious.    All other systems reviewed and are negative.      Objective:      Physical Exam   Constitutional: She is oriented to person, place, and time. She appears well-developed and well-nourished.   HENT:   Mouth/Throat: No oropharyngeal exudate.   Cardiovascular: Normal rate and normal heart sounds.    Pulmonary/Chest: Effort normal and breath sounds normal. She has no wheezes.   Abdominal: Soft. Bowel sounds are normal. There is no tenderness.   Musculoskeletal: She exhibits no edema or tenderness.   Lymphadenopathy:     She has no cervical adenopathy.   Neurological: She is alert and oriented to person, place, and time. Coordination normal.   Skin: Skin is warm and dry. No rash noted.   Psychiatric: She has a normal mood and affect. Judgment and thought content normal.   Vitals reviewed.      WBC   Date Value Ref Range Status   03/01/2018 4.23 3.90 - 12.70 K/uL Final     Hemoglobin   Date Value Ref Range Status   03/01/2018 8.2 (L) 12.0 - 16.0 g/dL Final     POC Hematocrit   Date Value Ref Range Status   01/12/2016 25 (L) 36 - 54 %PCV Final     Hematocrit   Date Value Ref Range Status   03/01/2018 27.7 (L) 37.0 - 48.5 % Final     Platelets   Date  Value Ref Range Status   03/01/2018 226 150 - 350 K/uL Final     Gran # (ANC)   Date Value Ref Range Status   03/01/2018 2.1 1.8 - 7.7 K/uL Final     Comment:     The ANC is based on a white cell differential from an   automated cell counter. It has not been microscopically   reviewed for the presence of abnormal cells. Clinical   correlation is required.         Chemistry        Component Value Date/Time     03/01/2018 0828    K 4.0 03/01/2018 0828     (H) 03/01/2018 0828    CO2 21 (L) 03/01/2018 0828    BUN 14 03/01/2018 0828    CREATININE 1.6 (H) 03/01/2018 0828    GLU 87 03/01/2018 0828        Component Value Date/Time    CALCIUM 8.1 (L) 03/01/2018 0828    ALKPHOS 91 03/01/2018 0828    AST 40 03/01/2018 0828    ALT 38 03/01/2018 0828    BILITOT 0.5 03/01/2018 0828    ESTGFRAFRICA 36.6 (A) 03/01/2018 0828    EGFRNONAA 31.7 (A) 03/01/2018 0828        CORRECTED CALCIUM: 8.9     Assessment:       1. Malignant neoplasm of lower lobe of right lung    2. Secondary cancer of bone    3. Brain metastases    4. Anemia due to chronic blood loss        Plan:        1,2,3. She is doing well on Tagrisso and I will repeat labs in 2 weeks. She will receive Xgeva today  4. She will need EGD and colonoscopy. She knows to call and schedule.      Above care plan was discussed with patient and accompanying  and all questions were addressed to their satisfaction

## 2018-03-04 ENCOUNTER — PATIENT MESSAGE (OUTPATIENT)
Dept: HEMATOLOGY/ONCOLOGY | Facility: CLINIC | Age: 73
End: 2018-03-04

## 2018-03-07 ENCOUNTER — TELEPHONE (OUTPATIENT)
Dept: HEMATOLOGY/ONCOLOGY | Facility: CLINIC | Age: 73
End: 2018-03-07

## 2018-03-07 NOTE — TELEPHONE ENCOUNTER
Informed patient's son the next scan will be 2-3 months after starting medication (tagrisso).     Notified patient as well.    Patient voiced understanding.  She confirmed 3/16 appointments.

## 2018-03-07 NOTE — TELEPHONE ENCOUNTER
"Spoke with patient's son, who is calling to speak with nurse about the next follow up with dr sullivan.  Nurse reviewed upcoming appointments with son.  Informed son patient needs to call endo to coordinate egd/colonoscopy.   Son asked nurse, "when will she be scanned again?"  Nurse informed son she would contact him back after speaking with dr sullivan.  Son voiced understanding.      Message routed to dr sullivan (patient started tagrisso on 2/15)      "

## 2018-03-07 NOTE — TELEPHONE ENCOUNTER
----- Message from Kim Holt sent at 3/7/2018  8:49 AM CST -----  Contact: Pt son Basil  Pt son calling to follow up regarding an email sent through portal    Basil call back number  [phone] ext [phone]

## 2018-03-08 ENCOUNTER — TELEPHONE (OUTPATIENT)
Dept: ENDOSCOPY | Facility: HOSPITAL | Age: 73
End: 2018-03-08

## 2018-03-08 DIAGNOSIS — D50.9 IRON DEFICIENCY ANEMIA, UNSPECIFIED IRON DEFICIENCY ANEMIA TYPE: Primary | ICD-10-CM

## 2018-03-08 NOTE — TELEPHONE ENCOUNTER
Pt was not clean for her EGD/colonoscopy.  She has to have done again however just wants to schedule EGD at present not both.  She is on Xarelto and you gave the ok to stop x 3 days prior in 1/2018.  Pt will need to stop again x 2 days prior to EGD.  Ok for pt to stop?  Please advise. Thanks

## 2018-03-08 NOTE — TELEPHONE ENCOUNTER
Pt request to have EGD/colonoscopy done separately.  Wants EGD done 1st.  Pt is currently taking Xarelto.  Message sent to Dr. Vazquez for holding instructions.

## 2018-03-09 ENCOUNTER — TELEPHONE (OUTPATIENT)
Dept: PHARMACY | Facility: CLINIC | Age: 73
End: 2018-03-09

## 2018-03-11 ENCOUNTER — PATIENT MESSAGE (OUTPATIENT)
Dept: HEMATOLOGY/ONCOLOGY | Facility: CLINIC | Age: 73
End: 2018-03-11

## 2018-03-12 ENCOUNTER — TELEPHONE (OUTPATIENT)
Dept: HEMATOLOGY/ONCOLOGY | Facility: CLINIC | Age: 73
End: 2018-03-12

## 2018-03-12 NOTE — TELEPHONE ENCOUNTER
spoke with pt son on today in regards to 03/16/18 appointments, son will call back after discussing with mom regarding appointments.

## 2018-03-14 ENCOUNTER — HOSPITAL ENCOUNTER (OUTPATIENT)
Facility: HOSPITAL | Age: 73
Discharge: HOME | End: 2018-03-14
Attending: INTERNAL MEDICINE | Admitting: INTERNAL MEDICINE
Payer: MEDICARE

## 2018-03-14 ENCOUNTER — ANESTHESIA EVENT (OUTPATIENT)
Dept: ENDOSCOPY | Facility: HOSPITAL | Age: 73
End: 2018-03-14
Payer: MEDICARE

## 2018-03-14 ENCOUNTER — ANESTHESIA (OUTPATIENT)
Dept: ENDOSCOPY | Facility: HOSPITAL | Age: 73
End: 2018-03-14
Payer: MEDICARE

## 2018-03-14 ENCOUNTER — SURGERY (OUTPATIENT)
Age: 73
End: 2018-03-14

## 2018-03-14 VITALS
HEART RATE: 70 BPM | WEIGHT: 143 LBS | DIASTOLIC BLOOD PRESSURE: 70 MMHG | HEIGHT: 66 IN | TEMPERATURE: 98 F | SYSTOLIC BLOOD PRESSURE: 146 MMHG | BODY MASS INDEX: 22.98 KG/M2 | OXYGEN SATURATION: 100 % | RESPIRATION RATE: 14 BRPM

## 2018-03-14 DIAGNOSIS — D50.0 ANEMIA DUE TO CHRONIC BLOOD LOSS: Primary | ICD-10-CM

## 2018-03-14 DIAGNOSIS — D50.9 IDA (IRON DEFICIENCY ANEMIA): ICD-10-CM

## 2018-03-14 PROCEDURE — 25000003 PHARM REV CODE 250: Performed by: INTERNAL MEDICINE

## 2018-03-14 PROCEDURE — 63600175 PHARM REV CODE 636 W HCPCS: Performed by: NURSE ANESTHETIST, CERTIFIED REGISTERED

## 2018-03-14 PROCEDURE — 88305 TISSUE EXAM BY PATHOLOGIST: CPT | Performed by: PATHOLOGY

## 2018-03-14 PROCEDURE — 43239 EGD BIOPSY SINGLE/MULTIPLE: CPT | Mod: GC,,, | Performed by: INTERNAL MEDICINE

## 2018-03-14 PROCEDURE — 37000009 HC ANESTHESIA EA ADD 15 MINS: Performed by: INTERNAL MEDICINE

## 2018-03-14 PROCEDURE — D9220A PRA ANESTHESIA: Mod: ,,, | Performed by: ANESTHESIOLOGY

## 2018-03-14 PROCEDURE — 37000008 HC ANESTHESIA 1ST 15 MINUTES: Performed by: INTERNAL MEDICINE

## 2018-03-14 PROCEDURE — 88305 TISSUE EXAM BY PATHOLOGIST: CPT | Mod: 26,,, | Performed by: PATHOLOGY

## 2018-03-14 PROCEDURE — 27201012 HC FORCEPS, HOT/COLD, DISP: Performed by: INTERNAL MEDICINE

## 2018-03-14 PROCEDURE — 43239 EGD BIOPSY SINGLE/MULTIPLE: CPT | Performed by: INTERNAL MEDICINE

## 2018-03-14 PROCEDURE — 88342 IMHCHEM/IMCYTCHM 1ST ANTB: CPT | Performed by: PATHOLOGY

## 2018-03-14 PROCEDURE — 88342 IMHCHEM/IMCYTCHM 1ST ANTB: CPT | Mod: 26,,, | Performed by: PATHOLOGY

## 2018-03-14 RX ORDER — SODIUM CHLORIDE 9 MG/ML
INJECTION, SOLUTION INTRAVENOUS CONTINUOUS
Status: DISCONTINUED | OUTPATIENT
Start: 2018-03-15 | End: 2018-03-14 | Stop reason: HOSPADM

## 2018-03-14 RX ORDER — PROPOFOL 10 MG/ML
VIAL (ML) INTRAVENOUS CONTINUOUS PRN
Status: DISCONTINUED | OUTPATIENT
Start: 2018-03-14 | End: 2018-03-14

## 2018-03-14 RX ORDER — PROPOFOL 10 MG/ML
VIAL (ML) INTRAVENOUS
Status: DISCONTINUED | OUTPATIENT
Start: 2018-03-14 | End: 2018-03-14

## 2018-03-14 RX ORDER — LIDOCAINE HCL/PF 100 MG/5ML
SYRINGE (ML) INTRAVENOUS
Status: DISCONTINUED | OUTPATIENT
Start: 2018-03-14 | End: 2018-03-14

## 2018-03-14 RX ADMIN — PROPOFOL 150 MCG/KG/MIN: 10 INJECTION, EMULSION INTRAVENOUS at 10:03

## 2018-03-14 RX ADMIN — LIDOCAINE HYDROCHLORIDE 50 MG: 20 INJECTION, SOLUTION INTRAVENOUS at 10:03

## 2018-03-14 RX ADMIN — PROPOFOL 80 MG: 10 INJECTION, EMULSION INTRAVENOUS at 10:03

## 2018-03-14 RX ADMIN — SODIUM CHLORIDE: 9 INJECTION, SOLUTION INTRAVENOUS at 09:03

## 2018-03-14 NOTE — TRANSFER OF CARE
"Anesthesia Transfer of Care Note    Patient: Naty St    Procedure(s) Performed: Procedure(s) (LRB):  ESOPHAGOGASTRODUODENOSCOPY (EGD) (N/A)    Patient location: PACU    Anesthesia Type: general    Transport from OR: Transported from OR on 2-3 L/min O2 by NC with adequate spontaneous ventilation    Post pain: adequate analgesia    Post assessment: no apparent anesthetic complications    Post vital signs: stable    Level of consciousness: responds to stimulation and sedated    Nausea/Vomiting: no nausea/vomiting    Complications: none    Transfer of care protocol was followed      Last vitals:   Visit Vitals  BP (!) 153/73 (BP Location: Left arm, Patient Position: Sitting)   Pulse 69   Temp 36.5 °C (97.7 °F) (Temporal)   Resp 16   Ht 5' 6" (1.676 m)   Wt 64.9 kg (143 lb)   LMP  (LMP Unknown)   SpO2 97%   Breastfeeding? No   BMI 23.08 kg/m²     "

## 2018-03-14 NOTE — PROVATION PATIENT INSTRUCTIONS
Discharge Summary/Instructions after an Endoscopic Procedure  Patient Name: Naty St  Patient MRN: [id number]  Patient YOB: 1945 Wednesday, March 14, 2018  Alex Carmona MD  RESTRICTIONS:  During your procedure today, you received medications for sedation.  These   medications may affect your judgment, balance and coordination.  Therefore,   for 24 hours, you have the following restrictions:   - DO NOT drive a car, operate machinery, make legal/financial decisions,   sign important papers or drink alcohol.    ACTIVITY:  The following day: return to full activity including work, except no heavy   lifting, straining or running for 3 days if polyps were removed.  DIET:  Eat and drink normally unless instructed otherwise.     TREATMENT FOR COMMON SIDE EFFECTS:  - Mild abdominal pain, nausea, belching, bloating or excessive gas:  rest,   eat lightly and use a heating pad.  - Sore Throat: treat with throat lozenges and/or gargle with warm salt   water.  - Because air was used during the procedure, expelling large amounts of air   from your rectum or belching is normal.  - If a bowel prep was taken, you may not have a bowel movement for 1-3 days.    This is normal.  SYMPTOMS TO WATCH FOR AND REPORT TO YOUR PHYSICIAN:  1. Abdominal pain or bloating, other than gas cramps.  2. Chest pain.  3. Back pain.  4. Signs of infection such as: chills or fever occurring within 24 hours   after the procedure.  5. Rectal bleeding, which would show as bright red, maroon, or black stools.   (A tablespoon of blood from the rectum is not serious, especially if   hemorrhoids are present.)  6. Vomiting.  7. Weakness or dizziness.  GO DIRECTLY TO THE NEAREST EMERGENCY ROOM IF YOU HAVE ANY OF THE FOLLOWING:      Difficulty breathing  Chills and/or fever over 101 F   Persistent vomiting and/or vomiting blood   Severe abdominal pain   Severe chest pain   Black, tarry stools   Bleeding- more than one tablespoon   Any other  symptom or condition that you feel may need urgent attention  Your doctor recommends these additional instructions:  If any biopsies were taken, your doctors clinic will contact you in 1 to 2   weeks with any results.  You are being discharged to home.   You have a contact number available for emergencies.  The signs and symptoms   of potential delayed complications were discussed with you.  You may return   to normal activities tomorrow.  Written discharge instructions were   provided to you.   Resume your previous diet.   Continue your present medications.   We are waiting for your pathology results.   Telephone your GI clinic for pathology results in one week.   Resume taking Xarelto (rivaroxaban) at your prior dose tomorrow.   Return to your referring physician.  For questions, problems or results please call your physician - Alex Carmona MD at Work:  [phone].  [facility], EMERGENCY ROOM PHONE NUMBER: [phone]  IF A COMPLICATION OR EMERGENCY SITUATION ARISES AND YOU ARE UNABLE TO REACH   YOUR PHYSICIAN - GO DIRECTLY TO THE EMERGENCY ROOM.  Alex Carmona MD  3/14/2018 10:50:07 AM  This report has been verified and signed electronically.

## 2018-03-14 NOTE — H&P
Short Stay Endoscopy History and Physical    PCP - Olvin Mars MD     Procedure - EGD  ASA - per anesthesia  Mallampati - per anesthesia  History of Anesthesia problems - no  Family history Anesthesia problems -  no   Plan of anesthesia - General/MAC    HPI:  This is a 72 y.o. female here for evaluation of : iron deficiency anemia, originally scheduled for EGD/colonoscopy but patient states she declined to do these both together and wanted to do the EGD only first.  Denies melena or hematochezia.    Reflux - no  Dysphagia - no  Abdominal pain - no  Diarrhea - no    ROS:  Constitutional: No fevers, chills, No weight loss  CV: No chest pain  Pulm: No cough, No shortness of breath  Ophtho: No vision changes  GI: see HPI  Derm: No rash    Medical History:  has a past medical history of Anticoagulant long-term use; Blood clot in vein (06/2016); Breast cancer (1994); Cataract; Hypertension; Pancreatic cancer (1998); Posterior capsular opacification, left eye; Psychiatric problem; Seizures (01/25/2016); Stroke; and Therapy.    Surgical History:  has a past surgical history that includes Kidney stone surgery (2010--laser); left eswl (6/20/12); cysto and right ureteral stent (4/27/12); Oophorectomy (4/25/2012); Breast lumpectomy (1998); ecoli (2010); Whipple procedure w/ laparoscopy (1998); Cataract extraction (Bilateral, 2004); Colonoscopy (N/A, 11/13/2015); Hysterectomy (1974); Cholecystectomy (1998); BONE BIOSPY (3/9/16); and Brain surgery (1/12/16).    Family History: family history includes Breast cancer in her mother; Leukemia in her paternal grandfather; Lung cancer in her mother; Stomach cancer in her paternal grandmother.. Otherwise no colon cancer, inflammatory bowel disease, or GI malignancies.    Social History:  reports that she quit smoking about 56 years ago. She smoked 0.00 packs per day for 0.00 years. She has never used smokeless tobacco. She reports that she does not drink alcohol or use  "drugs.    Review of patient's allergies indicates:   Allergen Reactions    Tobradex [tobramycin-dexamethasone] Swelling    Iodinated contrast- oral and iv dye Hives    Morphine Other (See Comments)     "shaking" and tremors    Latex Rash    Phenytoin sodium extended Other (See Comments) and Rash       Medications:   Prescriptions Prior to Admission   Medication Sig Dispense Refill Last Dose    amitriptyline (ELAVIL) 50 MG tablet Take 1 tablet (50 mg total) by mouth every evening. 30 tablet 2 3/13/2018 at Unknown time    amLODIPine (NORVASC) 5 MG tablet Take 1 tablet (5 mg total) by mouth once daily. 30 tablet 11 3/14/2018 at Unknown time    atorvastatin (LIPITOR) 40 MG tablet Take 1 tablet (40 mg total) by mouth once daily. 90 tablet 3 3/13/2018 at Unknown time    calcium citrate 250 mg calcium Tab Take 750 mg by mouth 3 (three) times daily.  0 3/14/2018 at Unknown time    CREON CpDR TAKE ONE CAPSULE BY MOUTH THREE TIMES A DAY WITH MEALS 270 capsule 3 Past Week at Unknown time    denosumab (XGEVA) 120 mg/1.7 mL (70 mg/mL) Soln Inject 120 mg into the skin every 28 days.   Past Month at Unknown time    dronabinol (MARINOL) 5 MG capsule Take 1 capsule (5 mg total) by mouth 2 (two) times daily before meals. 60 capsule 3 Past Week at Unknown time    ergocalciferol (ERGOCALCIFEROL) 50,000 unit Cap Take 1 capsule (50,000 Units total) by mouth every 7 days. Take weekly for 8 weeks and once thereafter 12 capsule 3 Past Week at Unknown time    ferrous sulfate 325 mg (65 mg iron) Tab tablet Take 1 tablet (325 mg total) by mouth 2 (two) times daily. 180 tablet 2 Past Month at Unknown time    LORazepam (ATIVAN) 1 MG tablet TAKE ONE TABLET BY MOUTH TWICE DAILY 60 tablet 1 3/14/2018 at Unknown time    metoprolol succinate (TOPROL-XL) 50 MG 24 hr tablet Take 1 tablet (50 mg total) by mouth once daily. 30 tablet 11 3/14/2018 at Unknown time    multivitamin (THERAGRAN) per tablet Take 1 tablet by mouth once daily.   " 3/13/2018 at Unknown time    osimertinib (TAGRISSO) 80 mg Tab Take 1 tablet by mouth once daily. 30 tablet 6 3/14/2018 at Unknown time    sodium bicarbonate 650 MG tablet Take 1 tablet (650 mg total) by mouth 3 (three) times daily. Increase dose to 2 60 mg tabs by mouth three times daily. 270 tablet 11 3/13/2018 at Unknown time    clindamycin phosphate 1% (CLINDAGEL) 1 % gel Apply topically 2 (two) times daily. 60 g 6 More than a month at Unknown time    doxycycline (VIBRAMYCIN) 100 MG Cap Take 1 capsule (100 mg total) by mouth every 12 (twelve) hours. 30 capsule 1 More than a month at Unknown time    erlotinib (TARCEVA) 150 MG tablet Take 1 tablet (150 mg total) by mouth once daily. 60 tablet 12 Taking    levETIRAcetam (KEPPRA) 750 MG Tab Take 1 tablet (750 mg total) by mouth 2 (two) times daily. 60 tablet 3 Taking    meclizine (ANTIVERT) 12.5 mg tablet Take 1-2 tablets (12.5-25 mg total) by mouth 3 (three) times daily as needed for Dizziness. 30 tablet 0 More than a month at Unknown time    mirabegron 50 mg Tb24 Take 1 tablet (50 mg total) by mouth once daily. 30 tablet 11 Taking    rivaroxaban (XARELTO) 20 mg Tab Take 1 tablet (20 mg total) by mouth daily with dinner or evening meal. 30 tablet 11 3/11/2018    traMADol (ULTRAM) 50 mg tablet Take 1 tablet (50 mg total) by mouth every 6 (six) hours as needed for Pain. 30 tablet 0 More than a month at Unknown time       Physical Exam:    Vital Signs:   Vitals:    03/14/18 0935   BP: (!) 153/73   Pulse: 69   Resp: 16   Temp: 97.7 °F (36.5 °C)       General Appearance: Well appearing in no acute distress  Eyes:    No scleral icterus  ENT: Neck supple, Lips, mucosa, and tongue normal  Lungs: CTA anteriorly  Heart:  Regular rate, S1, S2 normal, no murmurs heard.  Abdomen: Soft, non tender, non distended with normal bowel sounds.   Extremities: No edema  Skin: No rash    Labs:  Lab Results   Component Value Date    WBC 4.23 03/01/2018    HGB 8.2 (L) 03/01/2018     HCT 27.7 (L) 03/01/2018     03/01/2018    CHOL 172 10/08/2017    TRIG 63 10/08/2017    HDL 76 (H) 10/08/2017    ALT 38 03/01/2018    AST 40 03/01/2018     03/01/2018    K 4.0 03/01/2018     (H) 03/01/2018    CREATININE 1.6 (H) 03/01/2018    BUN 14 03/01/2018    CO2 21 (L) 03/01/2018    TSH 2.938 10/08/2017    INR 0.9 10/08/2017    HGBA1C 4.1 10/08/2017       I have explained the risks and benefits of endoscopy procedures to the patient including but not limited to bleeding, perforation, infection, and death.      Ashish Carbone MD

## 2018-03-14 NOTE — ANESTHESIA PREPROCEDURE EVALUATION
"                                                                                                             03/14/2018  Naty St is a 72 y.o., female with a pre-operative diagnosis of Iron deficiency anemia due to chronic blood loss [D50.0] who is scheduled for Procedure(s) (LRB):  ESOPHAGOGASTRODUODENOSCOPY (EGD) (N/A).     Requested anesthesia type: General  Surgeon: Alex Carmona MD  Allergies:   Review of patient's allergies indicates:   Allergen Reactions    Tobradex [tobramycin-dexamethasone] Swelling    Iodinated contrast- oral and iv dye Hives    Morphine Other (See Comments)     "shaking" and tremors    Latex Rash    Phenytoin sodium extended Other (See Comments) and Rash     Vital Sign Range:    Chronic Medications:     (Not in a hospital admission)  Current Medications:   No current facility-administered medications for this visit.      No current outpatient prescriptions on file.     Facility-Administered Medications Ordered in Other Visits   Medication Dose Route Frequency Provider Last Rate Last Dose    [START ON 3/15/2018] 0.9%  NaCl infusion   Intravenous Continuous Alex Carmona MD 20 mL/hr at 03/14/18 0917      denosumab (XGEVA) solution 120 mg  120 mg Subcutaneous 1 time in Clinic/HOD Karrie Vazquez MD         Medical History:   Past Medical History:   Diagnosis Date    Anticoagulant long-term use     Blood clot in vein 06/2016    Breast cancer 1994    Cataract     Hypertension     Pancreatic cancer 1998    Posterior capsular opacification, left eye     Psychiatric problem     Seizures 01/25/2016    Stroke     Therapy      .    Anesthesia Evaluation    I have reviewed the Patient Summary Reports.    I have reviewed the Nursing Notes.   I have reviewed the Medications.     Review of Systems  Anesthesia Hx:  No problems with previous Anesthesia  Denies Family Hx of Anesthesia complications.   Denies Personal Hx of Anesthesia complications.   Cardiovascular:   " Hypertension, well controlled    Renal/:   Chronic Renal Disease    Neurological:   CVA Neuromuscular Disease, Seizures    Psych:   Psychiatric History               Anesthesia Plan  Type of Anesthesia, risks & benefits discussed:  Anesthesia Type:  general  Patient's Preference: as indicated.  Intra-op Monitoring Plan: standard ASA monitors  Intra-op Monitoring Plan Comments:   Post Op Pain Control Plan:   Post Op Pain Control Plan Comments:   Induction:   IV  Beta Blocker:  Patient is not currently on a Beta-Blocker (No further documentation required).       Informed Consent: Patient understands risks and agrees with Anesthesia plan.  Questions answered. Anesthesia consent signed with patient.  ASA Score: 3     Day of Surgery Review of History & Physical:    H&P update referred to the provider.     Anesthesia Plan Notes:          Ready For Surgery From Anesthesia Perspective.

## 2018-03-14 NOTE — ANESTHESIA POSTPROCEDURE EVALUATION
"Anesthesia Post Evaluation    Patient: Naty St    Procedure(s) Performed: Procedure(s) (LRB):  ESOPHAGOGASTRODUODENOSCOPY (EGD) (N/A)    Final Anesthesia Type: general  Patient location during evaluation: GI PACU  Patient participation: Yes- Able to Participate  Level of consciousness: awake and alert  Post-procedure vital signs: reviewed and stable  Pain management: adequate  Airway patency: patent  PONV status at discharge: No PONV  Anesthetic complications: no      Cardiovascular status: blood pressure returned to baseline  Respiratory status: unassisted  Hydration status: euvolemic  Follow-up not needed.        Visit Vitals  BP (!) 146/70   Pulse 70   Temp 36.5 °C (97.7 °F)   Resp 14   Ht 5' 6" (1.676 m)   Wt 64.9 kg (143 lb)   LMP  (LMP Unknown)   SpO2 100%   Breastfeeding? No   BMI 23.08 kg/m²       Pain/Melissa Score: Pain Assessment Performed: Yes (3/14/2018  9:39 AM)  Melissa Score: 10 (3/14/2018 11:11 AM)      "

## 2018-03-16 ENCOUNTER — RESEARCH ENCOUNTER (OUTPATIENT)
Dept: RESEARCH | Facility: HOSPITAL | Age: 73
End: 2018-03-16

## 2018-03-16 ENCOUNTER — LAB VISIT (OUTPATIENT)
Dept: LAB | Facility: HOSPITAL | Age: 73
End: 2018-03-16
Attending: INTERNAL MEDICINE
Payer: MEDICARE

## 2018-03-16 ENCOUNTER — OFFICE VISIT (OUTPATIENT)
Dept: HEMATOLOGY/ONCOLOGY | Facility: CLINIC | Age: 73
End: 2018-03-16
Payer: MEDICARE

## 2018-03-16 VITALS
OXYGEN SATURATION: 99 % | SYSTOLIC BLOOD PRESSURE: 149 MMHG | HEIGHT: 66 IN | BODY MASS INDEX: 23.46 KG/M2 | HEART RATE: 67 BPM | WEIGHT: 145.94 LBS | RESPIRATION RATE: 18 BRPM | DIASTOLIC BLOOD PRESSURE: 67 MMHG | TEMPERATURE: 98 F

## 2018-03-16 DIAGNOSIS — C79.51 SECONDARY CANCER OF BONE: ICD-10-CM

## 2018-03-16 DIAGNOSIS — C79.31 SECONDARY ADENOCARCINOMA OF BRAIN: ICD-10-CM

## 2018-03-16 DIAGNOSIS — D50.0 IRON DEFICIENCY ANEMIA DUE TO CHRONIC BLOOD LOSS: ICD-10-CM

## 2018-03-16 DIAGNOSIS — C79.31 BRAIN METASTASES: ICD-10-CM

## 2018-03-16 DIAGNOSIS — C34.31 MALIGNANT NEOPLASM OF LOWER LOBE OF RIGHT LUNG: ICD-10-CM

## 2018-03-16 DIAGNOSIS — C34.31 MALIGNANT NEOPLASM OF LOWER LOBE OF RIGHT LUNG: Primary | ICD-10-CM

## 2018-03-16 DIAGNOSIS — C34.90 MALIGNANT NEOPLASM OF LUNG, UNSPECIFIED LATERALITY, UNSPECIFIED PART OF LUNG: Primary | ICD-10-CM

## 2018-03-16 LAB
ALBUMIN SERPL BCP-MCNC: 2.8 G/DL
ALP SERPL-CCNC: 85 U/L
ALT SERPL W/O P-5'-P-CCNC: 22 U/L
ANION GAP SERPL CALC-SCNC: 7 MMOL/L
AST SERPL-CCNC: 27 U/L
BILIRUB SERPL-MCNC: 0.4 MG/DL
BUN SERPL-MCNC: 23 MG/DL
CALCIUM SERPL-MCNC: 8.8 MG/DL
CHLORIDE SERPL-SCNC: 110 MMOL/L
CO2 SERPL-SCNC: 23 MMOL/L
CREAT SERPL-MCNC: 1.7 MG/DL
ERYTHROCYTE [DISTWIDTH] IN BLOOD BY AUTOMATED COUNT: 14.8 %
EST. GFR  (AFRICAN AMERICAN): 34 ML/MIN/1.73 M^2
EST. GFR  (NON AFRICAN AMERICAN): 29.5 ML/MIN/1.73 M^2
GLUCOSE SERPL-MCNC: 97 MG/DL
HCT VFR BLD AUTO: 28.8 %
HGB BLD-MCNC: 8.6 G/DL
IMM GRANULOCYTES # BLD AUTO: 0.01 K/UL
MCH RBC QN AUTO: 25.4 PG
MCHC RBC AUTO-ENTMCNC: 29.9 G/DL
MCV RBC AUTO: 85 FL
NEUTROPHILS # BLD AUTO: 2.7 K/UL
PLATELET # BLD AUTO: 253 K/UL
PMV BLD AUTO: 11.1 FL
POTASSIUM SERPL-SCNC: 4.1 MMOL/L
PROT SERPL-MCNC: 6.1 G/DL
RBC # BLD AUTO: 3.39 M/UL
SODIUM SERPL-SCNC: 140 MMOL/L
WBC # BLD AUTO: 4.98 K/UL

## 2018-03-16 PROCEDURE — 80053 COMPREHEN METABOLIC PANEL: CPT

## 2018-03-16 PROCEDURE — 3078F DIAST BP <80 MM HG: CPT | Mod: CPTII,S$GLB,, | Performed by: INTERNAL MEDICINE

## 2018-03-16 PROCEDURE — 99499 UNLISTED E&M SERVICE: CPT | Mod: S$GLB,,, | Performed by: INTERNAL MEDICINE

## 2018-03-16 PROCEDURE — 3077F SYST BP >= 140 MM HG: CPT | Mod: CPTII,S$GLB,, | Performed by: INTERNAL MEDICINE

## 2018-03-16 PROCEDURE — 36415 COLL VENOUS BLD VENIPUNCTURE: CPT

## 2018-03-16 PROCEDURE — 99215 OFFICE O/P EST HI 40 MIN: CPT | Mod: S$GLB,,, | Performed by: INTERNAL MEDICINE

## 2018-03-16 PROCEDURE — 99999 PR PBB SHADOW E&M-EST. PATIENT-LVL V: CPT | Mod: PBBFAC,,, | Performed by: INTERNAL MEDICINE

## 2018-03-16 PROCEDURE — 85027 COMPLETE CBC AUTOMATED: CPT

## 2018-03-16 RX ORDER — ERLOTINIB HYDROCHLORIDE 150 MG/1
TABLET ORAL
COMMUNITY
Start: 2018-01-15 | End: 2018-03-16 | Stop reason: ALTCHOICE

## 2018-03-16 NOTE — Clinical Note
Schedule CMP and Xgeva and feraheme on 3/29 afternoon  Schedule CBC,CMp, PET scan and see me in mid April and also second dose of Feraheme

## 2018-03-16 NOTE — PROGRESS NOTES
The patient, her , and her son were approached by me in [facility] regarding participation in [employer]'s Brain (AST#[id number]) study (IRB#2016.122.A). They were agreeable.    Of note, the patient was previously consented by Devon Molina (another [employer]), and research blood was collected for AST#[id number]. This study is now complete.     The Informed Consent Form (ICF) was reviewed with pt and her family members. The discussion included:    - participation is voluntary;  - the blood specimen (3 tubes) will be collected at time of patient's next routine blood draw;   - pt can change her mind about participating at any time;  - if she changes her mind about participation, she can call us at contact info in the ICF, and we will discard samples remaining;  - samples that have been used prior to her notification will still be included in research;  - specimens will be stored with unique code that can only be linked to pt by [facility] staff;  - all medical information released to researchers will be stripped of identifiers;  - samples will not be released to outside researchers unless approvedby internal committee;  - there is a small risk of loss of confidentiality, but we make every effort to ensure privacy;  - no other physical risks outside of those involved in standard of care procedure.    Dr. Vazquez approved of the patient's participation and will continue to take care of the patient per her usual protocol - participation in [employer] program will not change the patient's present or future medical care. Pt did not have any questions. She did mention that she did not receive her Clin Card from last time; I told her I would look into it and call her. I told her that if she did not receive the last one, that we could replace with new one that has $50 on it - $25 for last study and $25 for this study. The patient seemed appreciative and agreeable. She willingly and independently signed the ICF. A copy of the  signed ICF and my business card were given to pt with instructions to call with any questions that may arise or if she should change her mind regarding participation in [employer] program.

## 2018-03-16 NOTE — PROGRESS NOTES
Subjective:       Patient ID: Naty St is a 72 y.o. female.    Chief Complaint: Malignant neoplasm of lower lobe of right lung; Results; and sinus concerns (nose is running and hurting)  Oncologic History:  Ms. Naty St was admitted to the hospital between 01/25/2016 and 01/28/2016. She has a history of pancreatic cancer 17 years ago, breast cancer and meningioma, presented to the hospital complaining of loss of consciousness. The patient apparently was in her normal state of health and she stood up to go to the bathroom and fell to the floor. The patient's daughter helped the mother up in the bathroom and noted that her mother's upper extremities were shaking and eye rolling. No reports of bowel or bladder incontinence, tongue biting or rolling. The second episode lasted about four minutes and she was extremely lethargic following that. Apparently, the patient had a meningioma resection done in the past; however, recently, underwent neurosurgical resection with Dr. 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    Additional immunohistochemical stains were performed which revealed the tumor cells to be are positive for TTF1 and negative for ER and GCDFP. The morphology and TTF1 positivity are most consistent with lung primary.    Of note, imaging scan at the end of January 2016 revealed a mass in the lung at 2 cm in the medial aspect of the apical segment of the right upper lobe abutting the mediastinum at the level of the azygous vein and abutting and possibly encasing the segmental bronchi and vessels of the apical segment of the right upper lobe and no pleural fluid was present. Also, there is an enlarged right paratracheal lymph node. No evidence of any metastatic disease at the pancreatic site with postoperative changes post Whipple disease  Her PET Scan from 2/15/16 reveal  ""Hypermetabolic mass in the right lung apex consistent with a primary malignancy. Hypermetabolic mediastinal lymph nodes consistent with metastatic disease. Right sacral hypermetabolic lesion consistent with metastatic disease, noting additional mildly sclerotic lesions in multiple vertebral bodies which do not demonstrate abnormal hypermetabolism  She underwent IR bone biopsy which revealed metastatic adenocarcinoma of lung origin. 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  She has completed focal RT to brain lesions in March 2016.    PET scan from 6/6/16 shows "Dramatic almost complete response to therapy."  Lovenox started after US lower ext 6/7/16 shows Acute complete occlusion of one of the left posterior tibial vein.Remote partial thrombus of the proximal left superficial femoral vein.  She is on Tarceva.   12/6/16 PET scan reveals "Stable right upper lobe lesion and right hilar lymph node. No new lesions identified. Trace left pleural effusion".  1/27/17 Renal u/s "Medical renal disease. Nonobstructive right nephrolithiasis"  1/31/17 PET - "Right upper lobe nodule and right hilar lymph node similar and very low grade activity.  There is no definite evidence of recurrence."   11/9/17 PET "In this patient with history of lung cancer, there is interval increase in size and hypermetabolism of a right upper lobe lung lesion concerning for recurrent disease. Additionally, there is interval appearance of a hypermetabolic paratracheal lymph node suspicious for metastatic disease"                 She progressed on Tarceva She underwent EBUS on 12/5/17. Pathology revealed adenocarcinoma but T790m could not be done as quantity was not insufficient. Her PET scan from 1/30/18 revealed The patient's previously identified abnormal lesions is slightly greater uptake of FDG on today's study compared with prior exam. These findings are concerning for progression of disease"    HPI She is now on Tagrisso since 2/15/18 and comes " in to review labs.     She denies any nausea, vomiting, diarrhea, constipation, abdominal pain, weight loss or loss of appetite, chest pain, shortness of breath, leg swelling, fatigue, pain, headache, dizziness, or mood changes. Her ECOG PS is zero   She underwent EGD on 3/14/18 which revealed gastritis, path is pending.    Review of Systems   Constitutional: Negative for appetite change, fatigue and unexpected weight change.   HENT: Negative for mouth sores.    Eyes: Negative for visual disturbance.   Respiratory: Negative for cough and shortness of breath.    Cardiovascular: Negative for chest pain.   Gastrointestinal: Negative for abdominal pain and diarrhea.   Genitourinary: Negative for frequency.   Musculoskeletal: Negative for back pain.   Skin: Negative for rash.   Neurological: Negative for headaches.   Hematological: Negative for adenopathy.   Psychiatric/Behavioral: The patient is not nervous/anxious.    All other systems reviewed and are negative.      PMFSH: all information reviewed and updated as relevant to today's visit  Objective:      Physical Exam   Constitutional: She is oriented to person, place, and time. She appears well-developed and well-nourished.   HENT:   Mouth/Throat: No oropharyngeal exudate.   Cardiovascular: Normal rate and normal heart sounds.    Pulmonary/Chest: Effort normal and breath sounds normal. She has no wheezes.   Abdominal: Soft. Bowel sounds are normal. There is no tenderness.   Musculoskeletal: She exhibits no edema or tenderness.   Lymphadenopathy:     She has no cervical adenopathy.   Neurological: She is alert and oriented to person, place, and time. Coordination normal.   Skin: Skin is warm and dry. No rash noted.   Psychiatric: She has a normal mood and affect. Judgment and thought content normal.   Vitals reviewed.      LABS:  WBC   Date Value Ref Range Status   03/16/2018 4.98 3.90 - 12.70 K/uL Final     Hemoglobin   Date Value Ref Range Status   03/16/2018 8.6  (L) 12.0 - 16.0 g/dL Final     POC Hematocrit   Date Value Ref Range Status   01/12/2016 25 (L) 36 - 54 %PCV Final     Hematocrit   Date Value Ref Range Status   03/16/2018 28.8 (L) 37.0 - 48.5 % Final     Platelets   Date Value Ref Range Status   03/16/2018 253 150 - 350 K/uL Final     Gran # (ANC)   Date Value Ref Range Status   03/16/2018 2.7 1.8 - 7.7 K/uL Final     Comment:     The ANC is based on a white cell differential from an   automated cell counter. It has not been microscopically   reviewed for the presence of abnormal cells. Clinical   correlation is required.         Chemistry        Component Value Date/Time     03/16/2018 0713    K 4.1 03/16/2018 0713     03/16/2018 0713    CO2 23 03/16/2018 0713    BUN 23 03/16/2018 0713    CREATININE 1.7 (H) 03/16/2018 0713    GLU 97 03/16/2018 0713        Component Value Date/Time    CALCIUM 8.8 03/16/2018 0713    ALKPHOS 85 03/16/2018 0713    AST 27 03/16/2018 0713    ALT 22 03/16/2018 0713    BILITOT 0.4 03/16/2018 0713    ESTGFRAFRICA 34.0 (A) 03/16/2018 0713    EGFRNONAA 29.5 (A) 03/16/2018 0713          Assessment:       1. Malignant neoplasm of lower lobe of right lung    2. Secondary cancer of bone    3. Iron deficiency anemia due to chronic blood loss    4. Brain metastases        Plan:        1,2,3,4. She will continue on Tagrisso. She will return in mid April with labs and scans.    Will return in 2 weeks for Xgeva and feraheme. Schedule colonoscopy to work iron def anemia    Above care plan was discussed with patient and accompanying  and son and all questions were addressed to their satisfaction

## 2018-03-21 ENCOUNTER — TELEPHONE (OUTPATIENT)
Dept: ENDOSCOPY | Facility: HOSPITAL | Age: 73
End: 2018-03-21

## 2018-03-21 ENCOUNTER — TELEPHONE (OUTPATIENT)
Dept: HEMATOLOGY/ONCOLOGY | Facility: CLINIC | Age: 73
End: 2018-03-21

## 2018-03-21 NOTE — TELEPHONE ENCOUNTER
----- Message from Penny Cohen sent at 3/21/2018 12:22 PM CDT -----  Contact: Pt  Pt called and states that she received a letter with dates for a appt time and would like to  Talk to you  Callback # [phone]

## 2018-03-22 ENCOUNTER — TELEPHONE (OUTPATIENT)
Dept: HEMATOLOGY/ONCOLOGY | Facility: CLINIC | Age: 73
End: 2018-03-22

## 2018-03-22 NOTE — TELEPHONE ENCOUNTER
spoke with pt on today in regards to all appointments, pt ask to speak with nurse in regards to MRI scheduled.

## 2018-03-28 ENCOUNTER — INFUSION (OUTPATIENT)
Dept: INFUSION THERAPY | Facility: HOSPITAL | Age: 73
End: 2018-03-28
Attending: INTERNAL MEDICINE
Payer: MEDICARE

## 2018-03-28 VITALS
DIASTOLIC BLOOD PRESSURE: 67 MMHG | SYSTOLIC BLOOD PRESSURE: 146 MMHG | RESPIRATION RATE: 18 BRPM | HEART RATE: 77 BPM | TEMPERATURE: 98 F

## 2018-03-28 DIAGNOSIS — C34.31 MALIGNANT NEOPLASM OF LOWER LOBE OF RIGHT LUNG: Primary | ICD-10-CM

## 2018-03-28 PROCEDURE — 25000003 PHARM REV CODE 250: Performed by: INTERNAL MEDICINE

## 2018-03-28 PROCEDURE — 96365 THER/PROPH/DIAG IV INF INIT: CPT

## 2018-03-28 PROCEDURE — 96372 THER/PROPH/DIAG INJ SC/IM: CPT | Mod: 59

## 2018-03-28 PROCEDURE — 63600175 PHARM REV CODE 636 W HCPCS: Mod: JG | Performed by: INTERNAL MEDICINE

## 2018-03-28 RX ADMIN — DENOSUMAB 120 MG: 120 INJECTION SUBCUTANEOUS at 03:03

## 2018-03-28 RX ADMIN — FERUMOXYTOL 510 MG: 510 INJECTION INTRAVENOUS at 03:03

## 2018-03-28 RX ADMIN — SODIUM CHLORIDE: 9 INJECTION, SOLUTION INTRAVENOUS at 03:03

## 2018-03-28 NOTE — PLAN OF CARE
Problem: Patient Care Overview  Goal: Plan of Care Review  Outcome: Ongoing (interventions implemented as appropriate)  Infusion completed, pt tolerated well; SQ injection to LUAQ, pt tolerated well; pt instructed to continue oral calcium w/ Vit D daily while receiving Xgeva injection; instructed to contact MD for any needs or concerns; printed AVS given to and reviewed with pt, pt verbalized understanding of all discussed and when to report next

## 2018-03-28 NOTE — PLAN OF CARE
Problem: Anemia (Adult)  Goal: Symptom Improvement  Patient will demonstrate the desired outcomes by discharge/transition of care.  Outcome: Ongoing (interventions implemented as appropriate)  Pt here for Fereheme infusion, Xgeva injection, accompanied by spouse, c/o of bilateral lower leg/ankle swelling x one week, no redness, tenderness or temperature extreme noted, pt asking if too much dietary salt can cause same, pt instructed to limit salt intake, instructed to contact PCP promptly for evaluation of same; discussed Fereheme treatment and reason for same, also reason for Xgeva injection; all pt questions answered and pt agrees to proceed with treatment today

## 2018-03-29 ENCOUNTER — TELEPHONE (OUTPATIENT)
Dept: HEMATOLOGY/ONCOLOGY | Facility: CLINIC | Age: 73
End: 2018-03-29

## 2018-03-29 NOTE — TELEPHONE ENCOUNTER
Spoke with patient.  She is requesting to have her second dose of feraheme next Wednesday.  Nurse scheduled infusion for her.  Patient thanked nurse.

## 2018-03-29 NOTE — TELEPHONE ENCOUNTER
----- Message from Kim Holt sent at 3/29/2018  1:50 PM CDT -----  Contact: Pt  Pt calling with questions regarding chemo        Pt call back number [phone]

## 2018-04-02 ENCOUNTER — TELEPHONE (OUTPATIENT)
Dept: PHARMACY | Facility: CLINIC | Age: 73
End: 2018-04-02

## 2018-04-04 ENCOUNTER — INFUSION (OUTPATIENT)
Dept: INFUSION THERAPY | Facility: HOSPITAL | Age: 73
End: 2018-04-04
Attending: INTERNAL MEDICINE
Payer: MEDICARE

## 2018-04-04 VITALS
HEART RATE: 79 BPM | DIASTOLIC BLOOD PRESSURE: 61 MMHG | TEMPERATURE: 98 F | SYSTOLIC BLOOD PRESSURE: 139 MMHG | RESPIRATION RATE: 16 BRPM

## 2018-04-04 DIAGNOSIS — C34.31 MALIGNANT NEOPLASM OF LOWER LOBE OF RIGHT LUNG: Primary | ICD-10-CM

## 2018-04-04 PROCEDURE — 96374 THER/PROPH/DIAG INJ IV PUSH: CPT

## 2018-04-04 PROCEDURE — 25000003 PHARM REV CODE 250: Performed by: INTERNAL MEDICINE

## 2018-04-04 PROCEDURE — 63600175 PHARM REV CODE 636 W HCPCS: Mod: JG | Performed by: INTERNAL MEDICINE

## 2018-04-04 RX ADMIN — FERUMOXYTOL 510 MG: 510 INJECTION INTRAVENOUS at 03:04

## 2018-04-04 RX ADMIN — SODIUM CHLORIDE: 9 INJECTION, SOLUTION INTRAVENOUS at 03:04

## 2018-04-04 NOTE — PLAN OF CARE
Problem: Patient Care Overview  Goal: Plan of Care Review  Outcome: Ongoing (interventions implemented as appropriate)  Pt tolerated Feraheme with no complications. Pt monitored post infusion with no s/s of reaction noted. VSS. Pt instructed to call MD with any problems. NAD. Pt discharged home with spouse at side.

## 2018-04-09 ENCOUNTER — HOSPITAL ENCOUNTER (INPATIENT)
Facility: HOSPITAL | Age: 73
LOS: 6 days | Discharge: HOME HEALTH | DRG: 193 | End: 2018-04-16
Attending: EMERGENCY MEDICINE | Admitting: INTERNAL MEDICINE
Payer: MEDICARE

## 2018-04-09 DIAGNOSIS — Z51.11 ENCOUNTER FOR ANTINEOPLASTIC CHEMOTHERAPY: ICD-10-CM

## 2018-04-09 DIAGNOSIS — N32.81 OAB (OVERACTIVE BLADDER): ICD-10-CM

## 2018-04-09 DIAGNOSIS — J96.01 ACUTE HYPOXEMIC RESPIRATORY FAILURE: ICD-10-CM

## 2018-04-09 DIAGNOSIS — C78.01 METASTATIC LUNG CARCINOMA, RIGHT: ICD-10-CM

## 2018-04-09 DIAGNOSIS — N39.0 URINARY TRACT INFECTION WITHOUT HEMATURIA, SITE UNSPECIFIED: ICD-10-CM

## 2018-04-09 DIAGNOSIS — R53.83 FATIGUE: ICD-10-CM

## 2018-04-09 DIAGNOSIS — I10 ESSENTIAL HYPERTENSION: ICD-10-CM

## 2018-04-09 DIAGNOSIS — N39.0 UTI (URINARY TRACT INFECTION): ICD-10-CM

## 2018-04-09 DIAGNOSIS — C34.91 ADENOCARCINOMA OF LUNG, RIGHT: ICD-10-CM

## 2018-04-09 DIAGNOSIS — I50.9 HEART FAILURE: ICD-10-CM

## 2018-04-09 DIAGNOSIS — C34.31 MALIGNANT NEOPLASM OF LOWER LOBE OF RIGHT LUNG: ICD-10-CM

## 2018-04-09 DIAGNOSIS — J18.9 COMMUNITY ACQUIRED PNEUMONIA, UNSPECIFIED LATERALITY: Primary | ICD-10-CM

## 2018-04-09 PROBLEM — Z86.73 HISTORY OF STROKE: Status: ACTIVE | Noted: 2018-04-09

## 2018-04-09 PROBLEM — G40.909 SEIZURE DISORDER: Status: ACTIVE | Noted: 2018-04-09

## 2018-04-09 LAB
ALBUMIN SERPL BCP-MCNC: 2.4 G/DL
ALP SERPL-CCNC: 93 U/L
ALT SERPL W/O P-5'-P-CCNC: 15 U/L
ANION GAP SERPL CALC-SCNC: 10 MMOL/L
AST SERPL-CCNC: 26 U/L
BACTERIA #/AREA URNS AUTO: ABNORMAL /HPF
BASOPHILS # BLD AUTO: 0.02 K/UL
BASOPHILS NFR BLD: 0.2 %
BILIRUB SERPL-MCNC: 0.5 MG/DL
BILIRUB UR QL STRIP: NEGATIVE
BNP SERPL-MCNC: 172 PG/ML
BUN SERPL-MCNC: 30 MG/DL
CALCIUM SERPL-MCNC: 8.5 MG/DL
CHLORIDE SERPL-SCNC: 112 MMOL/L
CLARITY UR REFRACT.AUTO: CLEAR
CO2 SERPL-SCNC: 17 MMOL/L
COLOR UR AUTO: ABNORMAL
CREAT SERPL-MCNC: 1.8 MG/DL
DIASTOLIC DYSFUNCTION: YES
DIFFERENTIAL METHOD: ABNORMAL
EOSINOPHIL # BLD AUTO: 0 K/UL
EOSINOPHIL NFR BLD: 0.3 %
ERYTHROCYTE [DISTWIDTH] IN BLOOD BY AUTOMATED COUNT: 18.2 %
EST. GFR  (AFRICAN AMERICAN): 31.7 ML/MIN/1.73 M^2
EST. GFR  (NON AFRICAN AMERICAN): 27.5 ML/MIN/1.73 M^2
ESTIMATED PA SYSTOLIC PRESSURE: 48.45
FLUAV AG SPEC QL IA: NEGATIVE
FLUBV AG SPEC QL IA: NEGATIVE
GLUCOSE SERPL-MCNC: 105 MG/DL
GLUCOSE UR QL STRIP: NEGATIVE
HCT VFR BLD AUTO: 30 %
HGB BLD-MCNC: 9.1 G/DL
HGB UR QL STRIP: NEGATIVE
IMM GRANULOCYTES # BLD AUTO: 0.13 K/UL
IMM GRANULOCYTES NFR BLD AUTO: 1.1 %
INR PPP: 1.5
KETONES UR QL STRIP: NEGATIVE
LACTATE SERPL-SCNC: 1.2 MMOL/L
LEUKOCYTE ESTERASE UR QL STRIP: ABNORMAL
LYMPHOCYTES # BLD AUTO: 1.1 K/UL
LYMPHOCYTES NFR BLD: 8.8 %
MAGNESIUM SERPL-MCNC: 1.9 MG/DL
MCH RBC QN AUTO: 25.3 PG
MCHC RBC AUTO-ENTMCNC: 30.3 G/DL
MCV RBC AUTO: 83 FL
MICROSCOPIC COMMENT: ABNORMAL
MONOCYTES # BLD AUTO: 1.4 K/UL
MONOCYTES NFR BLD: 11 %
NEUTROPHILS # BLD AUTO: 9.7 K/UL
NEUTROPHILS NFR BLD: 78.6 %
NITRITE UR QL STRIP: NEGATIVE
NRBC BLD-RTO: 0 /100 WBC
PH UR STRIP: 6 [PH] (ref 5–8)
PHOSPHATE SERPL-MCNC: 2.6 MG/DL
PLATELET # BLD AUTO: 222 K/UL
PMV BLD AUTO: 10.5 FL
POTASSIUM SERPL-SCNC: 3.7 MMOL/L
PROT SERPL-MCNC: 5.9 G/DL
PROT UR QL STRIP: NEGATIVE
PROTHROMBIN TIME: 15.2 SEC
RBC # BLD AUTO: 3.6 M/UL
RBC #/AREA URNS AUTO: 2 /HPF (ref 0–4)
RETIRED EF AND QEF - SEE NOTES: 53 (ref 55–65)
SODIUM SERPL-SCNC: 139 MMOL/L
SP GR UR STRIP: 1.01 (ref 1–1.03)
SPECIMEN SOURCE: NORMAL
TRICUSPID VALVE REGURGITATION: ABNORMAL
TROPONIN I SERPL DL<=0.01 NG/ML-MCNC: 0.02 NG/ML
URN SPEC COLLECT METH UR: ABNORMAL
UROBILINOGEN UR STRIP-ACNC: NEGATIVE EU/DL
WBC # BLD AUTO: 12.33 K/UL
WBC #/AREA URNS AUTO: 7 /HPF (ref 0–5)

## 2018-04-09 PROCEDURE — P9612 CATHETERIZE FOR URINE SPEC: HCPCS

## 2018-04-09 PROCEDURE — 80053 COMPREHEN METABOLIC PANEL: CPT

## 2018-04-09 PROCEDURE — 25000003 PHARM REV CODE 250: Performed by: STUDENT IN AN ORGANIZED HEALTH CARE EDUCATION/TRAINING PROGRAM

## 2018-04-09 PROCEDURE — 93005 ELECTROCARDIOGRAM TRACING: CPT

## 2018-04-09 PROCEDURE — 85610 PROTHROMBIN TIME: CPT

## 2018-04-09 PROCEDURE — 83735 ASSAY OF MAGNESIUM: CPT

## 2018-04-09 PROCEDURE — 99285 EMERGENCY DEPT VISIT HI MDM: CPT | Mod: ,,, | Performed by: EMERGENCY MEDICINE

## 2018-04-09 PROCEDURE — 83605 ASSAY OF LACTIC ACID: CPT

## 2018-04-09 PROCEDURE — 93306 TTE W/DOPPLER COMPLETE: CPT | Mod: 26,,, | Performed by: INTERNAL MEDICINE

## 2018-04-09 PROCEDURE — 96365 THER/PROPH/DIAG IV INF INIT: CPT

## 2018-04-09 PROCEDURE — 96361 HYDRATE IV INFUSION ADD-ON: CPT

## 2018-04-09 PROCEDURE — 63600175 PHARM REV CODE 636 W HCPCS: Performed by: PHYSICIAN ASSISTANT

## 2018-04-09 PROCEDURE — 94761 N-INVAS EAR/PLS OXIMETRY MLT: CPT

## 2018-04-09 PROCEDURE — 87186 SC STD MICRODIL/AGAR DIL: CPT

## 2018-04-09 PROCEDURE — 25000003 PHARM REV CODE 250: Performed by: PHYSICIAN ASSISTANT

## 2018-04-09 PROCEDURE — 87040 BLOOD CULTURE FOR BACTERIA: CPT

## 2018-04-09 PROCEDURE — 84484 ASSAY OF TROPONIN QUANT: CPT

## 2018-04-09 PROCEDURE — G0378 HOSPITAL OBSERVATION PER HR: HCPCS

## 2018-04-09 PROCEDURE — 87077 CULTURE AEROBIC IDENTIFY: CPT

## 2018-04-09 PROCEDURE — 99285 EMERGENCY DEPT VISIT HI MDM: CPT | Mod: 25

## 2018-04-09 PROCEDURE — 81001 URINALYSIS AUTO W/SCOPE: CPT

## 2018-04-09 PROCEDURE — 87400 INFLUENZA A/B EACH AG IA: CPT | Mod: 59

## 2018-04-09 PROCEDURE — 83880 ASSAY OF NATRIURETIC PEPTIDE: CPT

## 2018-04-09 PROCEDURE — 85025 COMPLETE CBC W/AUTO DIFF WBC: CPT

## 2018-04-09 PROCEDURE — 99223 1ST HOSP IP/OBS HIGH 75: CPT | Mod: AI,,, | Performed by: INTERNAL MEDICINE

## 2018-04-09 PROCEDURE — 93010 ELECTROCARDIOGRAM REPORT: CPT | Mod: ,,, | Performed by: INTERNAL MEDICINE

## 2018-04-09 PROCEDURE — 87088 URINE BACTERIA CULTURE: CPT

## 2018-04-09 PROCEDURE — 87086 URINE CULTURE/COLONY COUNT: CPT

## 2018-04-09 PROCEDURE — 96366 THER/PROPH/DIAG IV INF ADDON: CPT

## 2018-04-09 PROCEDURE — 93306 TTE W/DOPPLER COMPLETE: CPT

## 2018-04-09 PROCEDURE — 20600001 HC STEP DOWN PRIVATE ROOM

## 2018-04-09 PROCEDURE — 84100 ASSAY OF PHOSPHORUS: CPT

## 2018-04-09 RX ORDER — LEVETIRACETAM 750 MG/1
750 TABLET ORAL 2 TIMES DAILY
Status: DISCONTINUED | OUTPATIENT
Start: 2018-04-09 | End: 2018-04-16 | Stop reason: HOSPADM

## 2018-04-09 RX ORDER — ONDANSETRON 8 MG/1
8 TABLET, ORALLY DISINTEGRATING ORAL EVERY 8 HOURS PRN
Status: DISCONTINUED | OUTPATIENT
Start: 2018-04-09 | End: 2018-04-16 | Stop reason: HOSPADM

## 2018-04-09 RX ORDER — IBUPROFEN 200 MG
16 TABLET ORAL
Status: DISCONTINUED | OUTPATIENT
Start: 2018-04-09 | End: 2018-04-16 | Stop reason: HOSPADM

## 2018-04-09 RX ORDER — SODIUM BICARBONATE 650 MG/1
650 TABLET ORAL 3 TIMES DAILY
Status: DISCONTINUED | OUTPATIENT
Start: 2018-04-09 | End: 2018-04-16 | Stop reason: HOSPADM

## 2018-04-09 RX ORDER — ONDANSETRON 4 MG/1
4 TABLET, ORALLY DISINTEGRATING ORAL
Status: COMPLETED | OUTPATIENT
Start: 2018-04-09 | End: 2018-04-09

## 2018-04-09 RX ORDER — IBUPROFEN 200 MG
24 TABLET ORAL
Status: DISCONTINUED | OUTPATIENT
Start: 2018-04-09 | End: 2018-04-16 | Stop reason: HOSPADM

## 2018-04-09 RX ORDER — AMLODIPINE BESYLATE 5 MG/1
5 TABLET ORAL DAILY
Status: DISCONTINUED | OUTPATIENT
Start: 2018-04-09 | End: 2018-04-10

## 2018-04-09 RX ORDER — ERGOCALCIFEROL 1.25 MG/1
50000 CAPSULE ORAL
Status: DISCONTINUED | OUTPATIENT
Start: 2018-04-15 | End: 2018-04-09

## 2018-04-09 RX ORDER — PROCHLORPERAZINE EDISYLATE 5 MG/ML
10 INJECTION INTRAMUSCULAR; INTRAVENOUS
Status: DISCONTINUED | OUTPATIENT
Start: 2018-04-09 | End: 2018-04-09

## 2018-04-09 RX ORDER — ATORVASTATIN CALCIUM 20 MG/1
40 TABLET, FILM COATED ORAL DAILY
Status: DISCONTINUED | OUTPATIENT
Start: 2018-04-09 | End: 2018-04-16 | Stop reason: HOSPADM

## 2018-04-09 RX ORDER — SODIUM CHLORIDE 0.9 % (FLUSH) 0.9 %
5 SYRINGE (ML) INJECTION
Status: DISCONTINUED | OUTPATIENT
Start: 2018-04-09 | End: 2018-04-16 | Stop reason: HOSPADM

## 2018-04-09 RX ORDER — LORAZEPAM 0.5 MG/1
0.5 TABLET ORAL ONCE
Status: COMPLETED | OUTPATIENT
Start: 2018-04-09 | End: 2018-04-09

## 2018-04-09 RX ORDER — AMITRIPTYLINE HYDROCHLORIDE 25 MG/1
50 TABLET, FILM COATED ORAL NIGHTLY
Status: DISCONTINUED | OUTPATIENT
Start: 2018-04-09 | End: 2018-04-16 | Stop reason: HOSPADM

## 2018-04-09 RX ORDER — LORAZEPAM 1 MG/1
1 TABLET ORAL 2 TIMES DAILY
Status: DISCONTINUED | OUTPATIENT
Start: 2018-04-09 | End: 2018-04-09

## 2018-04-09 RX ORDER — MOXIFLOXACIN HYDROCHLORIDE 400 MG/1
400 TABLET ORAL DAILY
Status: DISCONTINUED | OUTPATIENT
Start: 2018-04-09 | End: 2018-04-11

## 2018-04-09 RX ORDER — GLUCAGON 1 MG
1 KIT INJECTION
Status: DISCONTINUED | OUTPATIENT
Start: 2018-04-09 | End: 2018-04-16 | Stop reason: HOSPADM

## 2018-04-09 RX ORDER — IPRATROPIUM BROMIDE AND ALBUTEROL SULFATE 2.5; .5 MG/3ML; MG/3ML
3 SOLUTION RESPIRATORY (INHALATION) EVERY 6 HOURS PRN
Status: DISCONTINUED | OUTPATIENT
Start: 2018-04-09 | End: 2018-04-11

## 2018-04-09 RX ORDER — ACETAMINOPHEN 325 MG/1
650 TABLET ORAL EVERY 4 HOURS PRN
Status: DISCONTINUED | OUTPATIENT
Start: 2018-04-09 | End: 2018-04-16 | Stop reason: HOSPADM

## 2018-04-09 RX ADMIN — PANCRELIPASE 1 CAPSULE: 60000; 12000; 38000 CAPSULE, DELAYED RELEASE PELLETS ORAL at 06:04

## 2018-04-09 RX ADMIN — AMITRIPTYLINE HYDROCHLORIDE 50 MG: 25 TABLET, FILM COATED ORAL at 09:04

## 2018-04-09 RX ADMIN — SODIUM CHLORIDE 1000 ML: 0.9 INJECTION, SOLUTION INTRAVENOUS at 09:04

## 2018-04-09 RX ADMIN — ATORVASTATIN CALCIUM 40 MG: 20 TABLET, FILM COATED ORAL at 01:04

## 2018-04-09 RX ADMIN — AMLODIPINE BESYLATE 5 MG: 5 TABLET ORAL at 01:04

## 2018-04-09 RX ADMIN — LEVETIRACETAM 750 MG: 750 TABLET ORAL at 09:04

## 2018-04-09 RX ADMIN — LORAZEPAM 0.5 MG: 0.5 TABLET ORAL at 09:04

## 2018-04-09 RX ADMIN — MOXIFLOXACIN HYDROCHLORIDE 400 MG: 400 TABLET, FILM COATED ORAL at 01:04

## 2018-04-09 RX ADMIN — ONDANSETRON 4 MG: 4 TABLET, ORALLY DISINTEGRATING ORAL at 09:04

## 2018-04-09 RX ADMIN — SODIUM BICARBONATE 650 MG TABLET 650 MG: at 03:04

## 2018-04-09 RX ADMIN — SODIUM BICARBONATE 650 MG TABLET 650 MG: at 09:04

## 2018-04-09 RX ADMIN — RIVAROXABAN 20 MG: 20 TABLET, FILM COATED ORAL at 06:04

## 2018-04-09 RX ADMIN — CEFTRIAXONE SODIUM 2 G: 2 INJECTION, POWDER, FOR SOLUTION INTRAMUSCULAR; INTRAVENOUS at 11:04

## 2018-04-09 NOTE — ED NOTES
Pt resting comfortably and independently repositioned in stretcher with bed locked in lowest position for safety. NAD noted at this time. Respirations even and unlabored and visible chest rise noted.  Patient offered bathroom assistance and denies need at this time. Pt instructed to call if assistance is needed. Pt on continuous cardiac, BP, and O2 monitoring. Call light within reach. Family at bedside. No needs at this time. Will continue to monitor.

## 2018-04-09 NOTE — SUBJECTIVE & OBJECTIVE
"Oncology Treatment Plan:   [No treatment plan]    Medications:  Continuous Infusions:  Scheduled Meds:   amitriptyline  50 mg Oral QHS    amLODIPine  5 mg Oral Daily    atorvastatin  40 mg Oral Daily    [START ON 4/15/2018] ergocalciferol  50,000 Units Oral Q7 Days    levETIRAcetam  750 mg Oral BID    lipase-protease-amylase 12,000-38,000-60,000 units  1 capsule Oral TID WM    LORazepam  1 mg Oral BID    moxifloxacin  400 mg Oral Daily    rivaroxaban  20 mg Oral Daily with dinner    sodium bicarbonate  650 mg Oral TID     PRN Meds:acetaminophen, albuterol-ipratropium 2.5mg-0.5mg/3mL, dextrose 50%, dextrose 50%, glucagon (human recombinant), glucose, glucose, ondansetron, promethazine (PHENERGAN) IVPB, sodium chloride 0.9%     Review of patient's allergies indicates:   Allergen Reactions    Tobradex [tobramycin-dexamethasone] Swelling    Iodinated contrast- oral and iv dye Hives    Morphine Other (See Comments)     "shaking" and tremors    Latex Rash    Phenytoin sodium extended Other (See Comments) and Rash        Past Medical History:   Diagnosis Date    Anticoagulant long-term use     Blood clot in vein 06/2016    Breast cancer 1994    Cataract     Hypertension     Pancreatic cancer 1998    Posterior capsular opacification, left eye     Psychiatric problem     Seizures 01/25/2016    Stroke     Therapy      Past Surgical History:   Procedure Laterality Date    BONE BIOSPY  3/9/16    BRAIN SURGERY  1/12/16    tumor removal 1/12/16    BREAST LUMPECTOMY  1998    left    CATARACT EXTRACTION Bilateral 2004    yag OU    CHOLECYSTECTOMY  1998    COLONOSCOPY N/A 11/13/2015    Procedure: COLONOSCOPY;  Surgeon: Alex Carmona MD;  Location: [facility] ([address] [place] [address]);  Service: Endoscopy;  Laterality: N/A;  2 year f/u    cysto and right ureteral stent  4/27/12    ecoli  2010    removal of blockage, done throat, then through the liver.    HYSTERECTOMY  1974    KIDNEY STONE SURGERY  2010--laser "    left eswl  6/20/12    OOPHORECTOMY  4/25/2012    Laparoscopic BSO, lysis of adhesions, cystoscopy greater than 35mins     WHIPPLE PROCEDURE W/ LAPAROSCOPY  1998     Family History     Problem Relation (Age of Onset)    Breast cancer Mother    Leukemia Paternal Grandfather    Lung cancer Mother    Stomach cancer Paternal Grandmother        Social History Main Topics    Smoking status: Former Smoker     Packs/day: 0.00     Years: 0.00     Quit date: 9/26/1961    Smokeless tobacco: Never Used    Alcohol use No    Drug use: No    Sexual activity: Yes     Partners: Male       Review of Systems   Constitutional: Positive for activity change. Negative for appetite change, fatigue and fever.   HENT: Negative for congestion and sinus pain.    Respiratory: Positive for cough and shortness of breath.    Cardiovascular: Positive for palpitations and leg swelling. Negative for chest pain.   Gastrointestinal: Positive for nausea. Negative for abdominal distention, abdominal pain, diarrhea and vomiting.   Genitourinary: Negative for decreased urine volume and dysuria.   Skin: Negative for rash and wound.   Neurological: Negative for dizziness and headaches.     Objective:     Vital Signs (Most Recent):  Temp: 98.9 °F (37.2 °C) (04/09/18 1334)  Pulse: 96 (04/09/18 1334)  Resp: (!) 24 (04/09/18 1334)  BP: 136/60 (04/09/18 1334)  SpO2: (!) 92 % (04/09/18 1334) Vital Signs (24h Range):  Temp:  [98.6 °F (37 °C)-98.9 °F (37.2 °C)] 98.9 °F (37.2 °C)  Pulse:  [86-96] 96  Resp:  [16-24] 24  SpO2:  [92 %-100 %] 92 %  BP: (116-143)/(59-86) 136/60     Weight: 68 kg (150 lb)  Body mass index is 27.44 kg/m².  Body surface area is 1.72 meters squared.    No intake or output data in the 24 hours ending 04/09/18 1420    Physical Exam   Constitutional: She appears well-developed and well-nourished. No distress.   HENT:   Head: Normocephalic and atraumatic.   Eyes: EOM are normal. Right eye exhibits no discharge. Left eye exhibits no  discharge.   Neck: Neck supple. JVD present.   Cardiovascular: Normal rate, regular rhythm, normal heart sounds and intact distal pulses.    No murmur heard.  Pulmonary/Chest: Effort normal and breath sounds normal. No respiratory distress. She has no wheezes. She has no rales.   Abdominal: Soft. Bowel sounds are normal. She exhibits no distension. There is no tenderness. There is no guarding.   Musculoskeletal: She exhibits edema (1+ in the lower extremities. ).   Neurological: She is alert.   She is oriented to person, place and somewhat to time. She has moments of confusion.    Skin: Skin is warm and dry. She is not diaphoretic. There is pallor.   Psychiatric: She has a normal mood and affect.   Vitals reviewed.      Significant Labs:   CBC:   Recent Labs  Lab 04/09/18  0925   WBC 12.33   HGB 9.1*   HCT 30.0*       and CMP:   Recent Labs  Lab 04/09/18  0925      K 3.7   *   CO2 17*      BUN 30*   CREATININE 1.8*   CALCIUM 8.5*   PROT 5.9*   ALBUMIN 2.4*   BILITOT 0.5   ALKPHOS 93   AST 26   ALT 15   ANIONGAP 10   EGFRNONAA 27.5*       Diagnostic Results:  I have reviewed all pertinent imaging results/findings within the past 24 hours.

## 2018-04-09 NOTE — ED TRIAGE NOTES
Pt reports she has been feeling weak and fatigued x 2 days. Pt reports she has been taking her chemo drugs every night. Pt didn't take her chemo drug today. Pt denies chest pain or sob.

## 2018-04-09 NOTE — ASSESSMENT & PLAN NOTE
Patient presenting with worsening non productive cough and SOB. CXR with abnormal infiltration vs congestion. Her WBC is 12.33 K.  She is not on home oxygen. She was on 2-4 L of oxygen in the ED and her sats are in the lower 90s%.   - She received one dose of 2 g ceftriaxone IV     Plan:   - Switch to moxifloxacin   - wean oxygen as tolerated.

## 2018-04-09 NOTE — HOSPITAL COURSE
Patient was admitted to hemonc service on 4/9 with acute hypoxic respiratory failure. She was requiring 4 L of nc on admission. She was started on moxi for CAP. She received one dose of ceftriaxone in the ED. On 2nd day of admission she spiked a fever. Her Hb was ~7 g/dl so she was transfused 1 unit of PRBCs. Over night she developed worsening of her symptoms with increased O2 requirements to 15 L HFNC. Her CXR showed worsening congestion. There was a concern of TRALI vs TACO. New 2D echo with diastolic dysfunction. She was given IV lasix pushes as needed and was started on dexamethasone.  Her abx was escalated to vanc and cefepime. She improved with diuresis and steroids. Pulmonary were consulted. Her O2 requirements continued to improve. On 4/15 she was switched to Augmentin. PT recommended discharging her to SNF but the patient refused and she wanted to go home with home health. She qualified for home oxygen so she was discharged on 3 L nc while at rest and 6 while active. Augmentin and dexamethasone were discontinued on discharge. Her BP medicine are held during this admission so it will be held till her next appointment with Dr. Vazquez.

## 2018-04-09 NOTE — ED NOTES
Pt. Resting in bed in NAD. RR e/u. Continuous cardiac, BP, and O2 monitoring in progress. VS being monitoring continuously per MD orders. Pt. Offered bathroom assistance and denies need at this time. Explanation of care/wait provided. Bed in low, locked position with rails up and call bell in reach. Will continue to monitor. D

## 2018-04-09 NOTE — ED PROVIDER NOTES
"Encounter Date: 4/9/2018    SCRIBE #1 NOTE: I, Gracie Clements, am scribing for, and in the presence of,  Dr. Londono. I have scribed the following portions of the note - the EKG reading and the APC attestation.       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 She presents to the ED for generalized fatigue. She reports having increased fatigue for approximately two days. Reports associated nausea and SOB. Last chemo infusion on 04/04/18. Reports taking oral chemo yesterday. Also, reports low grade temp of 99.3 on Friday. Reports having dry nonproductive cough for approximately one month. She denies HA, visual disturbances, slurred speech, paresthesias, or gait abnormalities. Denies BRBPR or melena. She denies chills, vomiting, chest pain, abd pain, dysuria, diarrhea, or constipation. She is a former smoker and denies alcohol use. Patient is followed by Dr. Vazquez in heme/onc.           Review of patient's allergies indicates:   Allergen Reactions    Tobradex [tobramycin-dexamethasone] Swelling    Iodinated contrast- oral and iv dye Hives    Morphine Other (See Comments)     "shaking" and tremors    Latex Rash    Phenytoin sodium extended Other (See Comments) and Rash     Past Medical History:   Diagnosis Date    Anticoagulant long-term use     Blood clot in vein 06/2016    Breast cancer 1994    Cataract     Hypertension     Pancreatic cancer 1998    Posterior capsular opacification, left eye     Psychiatric problem     Seizures 01/25/2016    Stroke     Therapy      Past Surgical History:   Procedure Laterality Date    BONE BIOSPY  3/9/16    BRAIN SURGERY  1/12/16    " tumor removal 1/12/16    BREAST LUMPECTOMY  1998    left    CATARACT EXTRACTION Bilateral 2004    yag OU    CHOLECYSTECTOMY  1998    COLONOSCOPY N/A 11/13/2015    Procedure: COLONOSCOPY;  Surgeon: Alex Carmona MD;  Location: [facility] ([address] [place] [address]);  Service: Endoscopy;  Laterality: N/A;  2 year f/u    cysto and right ureteral stent  4/27/12    ecoli  2010    removal of blockage, done throat, then through the liver.    HYSTERECTOMY  1974    KIDNEY STONE SURGERY  2010--laser    left eswl  6/20/12    OOPHORECTOMY  4/25/2012    Laparoscopic BSO, lysis of adhesions, cystoscopy greater than 35mins     WHIPPLE PROCEDURE W/ LAPAROSCOPY  1998     Family History   Problem Relation Age of Onset    Breast cancer Mother     Lung cancer Mother     Leukemia Paternal Grandfather     Stomach cancer Paternal Grandmother      Social History   Substance Use Topics    Smoking status: Former Smoker     Packs/day: 0.00     Years: 0.00     Quit date: 9/26/1961    Smokeless tobacco: Never Used    Alcohol use No     Review of Systems   Constitutional: Positive for fatigue. Negative for fever.   HENT: Negative for sore throat.    Respiratory: Positive for cough (dry, nonproductive) and shortness of breath.    Cardiovascular: Negative for chest pain.   Gastrointestinal: Positive for nausea. Negative for abdominal pain and vomiting.   Genitourinary: Negative for dysuria.   Musculoskeletal: Negative for back pain.   Skin: Negative for rash.   Allergic/Immunologic: Positive for immunocompromised state.   Neurological: Negative for weakness.   Hematological: Does not bruise/bleed easily.       Physical Exam     Initial Vitals [04/09/18 0851]   BP Pulse Resp Temp SpO2   (!) 116/59 92 16 98.6 °F (37 °C) --      MAP       78         Physical Exam    Vitals reviewed.  Constitutional: She appears well-developed and well-nourished. She is not diaphoretic. She appears ill. No distress.   Patient is resting comfortably in NAD on 2 L via  NC.    HENT:   Head: Normocephalic and atraumatic.   Nose: Nose normal.   Eyes: Conjunctivae and EOM are normal. Pupils are equal, round, and reactive to light.   Neck: Normal range of motion.   Cardiovascular: Normal rate and regular rhythm. Exam reveals no friction rub.    No murmur heard.  Pulmonary/Chest: Breath sounds normal. No respiratory distress. She has no wheezes. She has no rales.   Abdominal: Soft. Bowel sounds are normal. She exhibits no distension. There is no tenderness. There is no rebound.   Musculoskeletal: Normal range of motion.   Neurological: She is alert and oriented to person, place, and time. She has normal strength. No sensory deficit.   Motor strength of b/l UE and LE 5/5.    Skin: Skin is warm and dry. No erythema.   Psychiatric: She has a normal mood and affect. Thought content normal.         ED Course   Procedures  Labs Reviewed   CBC W/ AUTO DIFFERENTIAL - Abnormal; Notable for the following:        Result Value    RBC 3.60 (*)     Hemoglobin 9.1 (*)     Hematocrit 30.0 (*)     MCH 25.3 (*)     MCHC 30.3 (*)     RDW 18.2 (*)     Immature Granulocytes 1.1 (*)     Gran # (ANC) 9.7 (*)     Immature Grans (Abs) 0.13 (*)     Mono # 1.4 (*)     Gran% 78.6 (*)     Lymph% 8.8 (*)     All other components within normal limits   COMPREHENSIVE METABOLIC PANEL - Abnormal; Notable for the following:     Chloride 112 (*)     CO2 17 (*)     BUN, Bld 30 (*)     Creatinine 1.8 (*)     Calcium 8.5 (*)     Total Protein 5.9 (*)     Albumin 2.4 (*)     eGFR if  31.7 (*)     eGFR if non  27.5 (*)     All other components within normal limits   URINALYSIS, REFLEX TO URINE CULTURE - Abnormal; Notable for the following:     Leukocytes, UA 2+ (*)     All other components within normal limits    Narrative:     Preferred Collection Type->Urine, Clean Catch   PHOSPHORUS - Abnormal; Notable for the following:     Phosphorus 2.6 (*)     All other components within normal limits    PROTIME-INR - Abnormal; Notable for the following:     Prothrombin Time 15.2 (*)     INR 1.5 (*)     All other components within normal limits   URINALYSIS MICROSCOPIC - Abnormal; Notable for the following:     WBC, UA 7 (*)     All other components within normal limits    Narrative:     Preferred Collection Type->Urine, Clean Catch   CULTURE, BLOOD   CULTURE, BLOOD   CULTURE, RESPIRATORY   LACTIC ACID, PLASMA   INFLUENZA A AND B ANTIGEN   MAGNESIUM   B-TYPE NATRIURETIC PEPTIDE   TROPONIN I   TROPONIN I     EKG Readings: (Independently Interpreted)   Initial Reading: No STEMI. Rhythm: Normal Sinus Rhythm. Heart Rate: 88.   Left axis deviation. Occasional PVC. Normal ST segments.       Imaging Results          CT Head Without Contrast (Final result)  Result time 04/09/18 11:11:59    Final result by Logan Bah MD (04/09/18 11:11:59)                 Impression:      Remote postoperative changes of a prior left frontal intra-axial mass resection.    Remote right cerebellar infarct.    No new mass, hemorrhage or infarction.    Electronically signed by resident: Logan Sam  Date:    04/09/2018  Time:    10:20    Electronically signed by: Logan Bah MD  Date:    04/09/2018  Time:    11:11             Narrative:    EXAMINATION:  CT HEAD WITHOUT CONTRAST    CLINICAL HISTORY:  Neoplasm: head, metastatic, recurrence, suspected/known    TECHNIQUE:  Low dose axial CT images obtained throughout the head without intravenous contrast. Sagittal and coronal reconstructions were performed.    COMPARISON:  MRI brain 12/06/2017 with priors; PET-CT 01/30/2018    FINDINGS:  Intracranial compartment:    Ventricles and sulci are normal in size for age without evidence of hydrocephalus.    No extra-axial blood or fluid collections.    There are postsurgical changes of prior left frontal intra-axial lesion resection.  Persistent encephalomalacia within the left frontal lobe operative bed, not appreciably changed from prior  MRI examination allowing for variation in modality.  No new mass effect or surrounding edema.  Additional area of encephalomalacia appreciated within the right cerebellum compatible with remote infarct.  No new major vascular distribution infarct.  No acute parenchymal hemorrhage.  No new edema or mass effect.    Skull/extracranial contents (limited evaluation): No fracture. Mastoid air cells and paranasal sinuses are essentially clear.                               X-Ray Chest AP Portable (Final result)  Result time 04/09/18 10:51:30    Final result by Jacki Barajas MD (04/09/18 10:51:30)                 Impression:      Interval development of abnormal pulmonary parenchymal opacification and mild engorgement of the central pulmonary vascularity.  Given the history of sepsis, the pulmonary parenchymal findings could be due to infection, but the overall picture could also be related to congestive heart failure.      Electronically signed by: Jacki Barajas MD  Date:    04/09/2018  Time:    10:51             Narrative:    EXAMINATION:  XR CHEST AP PORTABLE    CLINICAL HISTORY:  Sepsis;    TECHNIQUE:  Single frontal view of the chest was performed.    COMPARISON:  October 8, 2017    FINDINGS:  Surgical clips are again identified overlying the left axilla.  Cardiac silhouette is exaggerated by AP portable technique and probably not enlarged.  Pulmonary vascularity is mildly increased, an interval change since October 8, 2017.  Since the previous study, bilateral alveolar opacification has developed diffusely.  Differential considerations include edema and infection.  Slight blunting of the lateral costophrenic sulci may be due to a small amount of pleural fluid.  There is no pneumothorax.  There are atherosclerotic calcifications of the aorta.    Visualized osseous structures are stable with degenerative changes of the spine.                                     Medical Decision Making:   History:   Old Medical  Records: I decided to obtain old medical records.  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 She presents to the ED for generalized fatigue.    Will order labs and imaging. Will order IVF. Will continue to monitor.     Differential diagnoses include, but are not limited to: pneumonia, influenza, metastatic disease, electrolyte imbalance, or UTI.     Leukocytosis with granulocytosis. H/H 9.1/30.0 increased from 8.6/28.8 three weeks ago. Elevated BUN 30. Elevated Cr 1.8. Initial lactate WNL. Blood cultures pending. Influenza negative. PT-INR 15.2-1.5. CT head found to have Remote postoperative changes of a prior left frontal intra-axial mass resection. Remote right cerebellar infarct. No new mass, hemorrhage or infarction. CXR found to have Interval development of abnormal pulmonary parenchymal opacification and mild engorgement of the central pulmonary vascularity. Will treat as community acquired pneumonia with rocephin while in ED.     Patient reassessed, patient desat to 78 % on 2 L via NC with minimal movement within bed. Patient resumed 98% sat at rest on 4 L via NC. Will continue to monitor. Case discussed with heme/onc, awaiting recommendations.     UA found to have 2+ leukocytes. On microscopic analysis, found to have 7 WBCs with rare bacteria. Appreciate heme/onc consult. They will admit patient to their service for further management.     I have discussed and reviewed with my supervising physician.           Scribe Attestation:    Scribe #1: I performed the above scribed service and the documentation accurately describes the services I performed. I attest to the accuracy of the note.    Attending Attestation:     Physician Attestation Statement for NP/PA:   I discussed this assessment and plan of this patient with the NP/PA, but I did not personally examine the patient. The face to face encounter was performed by the NP/PA.    Other NP/PA Attestation Additions:    History of Present Illness: 72 y.o. Female with co-morbidities of metastatic malignancy presents for an evaluation of fatigue and SOB.                       Clinical Impression:   The primary encounter diagnosis was Community acquired pneumonia, unspecified laterality. Diagnoses of Fatigue, Metastatic lung carcinoma, right, Heart failure, and Urinary tract infection without hematuria, site unspecified were also pertinent to this visit.    Disposition:   Disposition: Admitted                        Marie Benz PA-C  04/09/18 1231

## 2018-04-09 NOTE — PLAN OF CARE
Problem: Patient Care Overview  Goal: Plan of Care Review  Outcome: Ongoing (interventions implemented as appropriate)  Fall, pressure ulcer and infection precautions initiated. Patient admitted to room 826 with  at bedside. Patient slightly confused to time, Dr. Almazan notified. Skin is clean, dry and intact. Patient has a dry non-productive weak cough. Sputum culture pending sample collection. 2D echo completed. Patient is a two person assist to the bedside commode. Telemetry continued. Oxygen continued. Patient stable, will continue to monitor.

## 2018-04-09 NOTE — H&P
[facility]  Hematology/Oncology  H&P    Patient Name: Naty St  MRN: [id number]  Admission Date: 4/9/2018  Code Status: Full Code   Attending Provider: Og Pierce MD  Primary Care Physician: Olvin Mars MD  Principal Problem:Community acquired pneumonia    Subjective:     HPI: 72 year old female with DVT on Xeralto , Breast CA (1994), HTN, pacreatic CA (1998), meningioma s/p resection,  seizure disorder on Keppra, lung cancer in Feb 2016 with mets to brain and R hip, stroke in 10/17 with no residual deficit, and deppression. She presented to the ED with 2 days of fatigue and subjective fevers.   On the weekend she was feeling increasingly tired and weak. She was ambulating with assistance to the bathroom which is not her baseline. She was more short of breath and she has a dry cough that is worse than usual. She reports orthopnea due to bad coughing spills when she lay flat. She also reports some legs swellings, palpitations, and nausea. Her temp at home was 99.3.   She denies any change in appetite, urinary sx, diarrhea, wounds, rashes, or mouth sores.        Oncologic history from last clinic note:   Ms. Naty St was admitted to the hospital between 01/25/2016 and 01/28/2016. She has a history of pancreatic cancer 17 years ago, breast cancer and meningioma, presented to the hospital complaining of loss of consciousness. The patient apparently was in her normal state of health and she stood up to go to the bathroom and fell to the floor. The patient's daughter helped the mother up in the bathroom and noted that her mother's upper extremities were shaking and eye rolling. No reports of bowel or bladder incontinence, tongue biting or rolling. The second episode lasted about four minutes and she was extremely lethargic following that. Apparently, the patient had a meningioma resection done in the past; however, recently, underwent neurosurgical resection with   "Iban on 01/12/2016 and pathology from that revealed malignant neoplasm with multiple features pointing towards metastatic papillary serous adenocarcinoma.    Additional immunohistochemical stains were performed which revealed the tumor cells to be are positive for TTF1 and negative for ER and GCDFP. The morphology and TTF1 positivity are most consistent with lung primary.    Of note, imaging scan at the end of January 2016 revealed a mass in the lung at 2 cm in the medial aspect of the apical segment of the right upper lobe abutting the mediastinum at the level of the azygous vein and abutting and possibly encasing the segmental bronchi and vessels of the apical segment of the right upper lobe and no pleural fluid was present. Also, there is an enlarged right paratracheal lymph node. No evidence of any metastatic disease at the pancreatic site with postoperative changes post Whipple disease  Her PET Scan from 2/15/16 reveal "Hypermetabolic mass in the right lung apex consistent with a primary malignancy. Hypermetabolic mediastinal lymph nodes consistent with metastatic disease. Right sacral hypermetabolic lesion consistent with metastatic disease, noting additional mildly sclerotic lesions in multiple vertebral bodies which do not demonstrate abnormal hypermetabolism  She underwent IR bone biopsy which revealed metastatic adenocarcinoma of lung origin. 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  She has completed focal RT to brain lesions in March 2016.    PET scan from 6/6/16 shows "Dramatic almost complete response to therapy."  Lovenox started after US lower ext 6/7/16 shows Acute complete occlusion of one of the left posterior tibial vein.Remote partial thrombus of the proximal left superficial femoral vein.  She is on Tarceva.   12/6/16 PET scan reveals "Stable right upper lobe lesion and right hilar lymph node. No new lesions identified. Trace left pleural effusion".  1/27/17 Renal u/s "Medical renal disease. " "Nonobstructive right nephrolithiasis"  1/31/17 PET - "Right upper lobe nodule and right hilar lymph node similar and very low grade activity.  There is no definite evidence of recurrence."   11/9/17 PET "In this patient with history of lung cancer, there is interval increase in size and hypermetabolism of a right upper lobe lung lesion concerning for recurrent disease. Additionally, there is interval appearance of a hypermetabolic paratracheal lymph node suspicious for metastatic disease"   She progressed on Tarceva She underwent EBUS on 12/5/17. Pathology revealed adenocarcinoma but T790m could not be done as quantity was not insufficient. Her PET scan from 1/30/18 revealed The patient's previously identified abnormal lesions is slightly greater uptake of FDG on today's study compared with prior exam. These findings are concerning for progression of disease"     Oncology Treatment Plan:   [No treatment plan]    Medications:  Continuous Infusions:  Scheduled Meds:   amitriptyline  50 mg Oral QHS    amLODIPine  5 mg Oral Daily    atorvastatin  40 mg Oral Daily    [START ON 4/15/2018] ergocalciferol  50,000 Units Oral Q7 Days    levETIRAcetam  750 mg Oral BID    lipase-protease-amylase 12,000-38,000-60,000 units  1 capsule Oral TID WM    LORazepam  1 mg Oral BID    moxifloxacin  400 mg Oral Daily    rivaroxaban  20 mg Oral Daily with dinner    sodium bicarbonate  650 mg Oral TID     PRN Meds:acetaminophen, albuterol-ipratropium 2.5mg-0.5mg/3mL, dextrose 50%, dextrose 50%, glucagon (human recombinant), glucose, glucose, ondansetron, promethazine (PHENERGAN) IVPB, sodium chloride 0.9%     Review of patient's allergies indicates:   Allergen Reactions    Tobradex [tobramycin-dexamethasone] Swelling    Iodinated contrast- oral and iv dye Hives    Morphine Other (See Comments)     "shaking" and tremors    Latex Rash    Phenytoin sodium extended Other (See Comments) and Rash        Past Medical History: "   Diagnosis Date    Anticoagulant long-term use     Blood clot in vein 06/2016    Breast cancer 1994    Cataract     Hypertension     Pancreatic cancer 1998    Posterior capsular opacification, left eye     Psychiatric problem     Seizures 01/25/2016    Stroke     Therapy      Past Surgical History:   Procedure Laterality Date    BONE BIOSPY  3/9/16    BRAIN SURGERY  1/12/16    tumor removal 1/12/16    BREAST LUMPECTOMY  1998    left    CATARACT EXTRACTION Bilateral 2004    yag OU    CHOLECYSTECTOMY  1998    COLONOSCOPY N/A 11/13/2015    Procedure: COLONOSCOPY;  Surgeon: Alex Carmona MD;  Location: [facility] ([address] [place] [address]);  Service: Endoscopy;  Laterality: N/A;  2 year f/u    cysto and right ureteral stent  4/27/12    ecoli  2010    removal of blockage, done throat, then through the liver.    HYSTERECTOMY  1974    KIDNEY STONE SURGERY  2010--laser    left eswl  6/20/12    OOPHORECTOMY  4/25/2012    Laparoscopic BSO, lysis of adhesions, cystoscopy greater than 35mins     WHIPPLE PROCEDURE W/ LAPAROSCOPY  1998     Family History     Problem Relation (Age of Onset)    Breast cancer Mother    Leukemia Paternal Grandfather    Lung cancer Mother    Stomach cancer Paternal Grandmother        Social History Main Topics    Smoking status: Former Smoker     Packs/day: 0.00     Years: 0.00     Quit date: 9/26/1961    Smokeless tobacco: Never Used    Alcohol use No    Drug use: No    Sexual activity: Yes     Partners: Male       Review of Systems   Constitutional: Positive for activity change. Negative for appetite change, fatigue and fever.   HENT: Negative for congestion and sinus pain.    Respiratory: Positive for cough and shortness of breath.    Cardiovascular: Positive for palpitations and leg swelling. Negative for chest pain.   Gastrointestinal: Positive for nausea. Negative for abdominal distention, abdominal pain, diarrhea and vomiting.   Genitourinary: Negative for decreased urine volume  and dysuria.   Skin: Negative for rash and wound.   Neurological: Negative for dizziness and headaches.     Objective:     Vital Signs (Most Recent):  Temp: 98.9 °F (37.2 °C) (04/09/18 1334)  Pulse: 96 (04/09/18 1334)  Resp: (!) 24 (04/09/18 1334)  BP: 136/60 (04/09/18 1334)  SpO2: (!) 92 % (04/09/18 1334) Vital Signs (24h Range):  Temp:  [98.6 °F (37 °C)-98.9 °F (37.2 °C)] 98.9 °F (37.2 °C)  Pulse:  [86-96] 96  Resp:  [16-24] 24  SpO2:  [92 %-100 %] 92 %  BP: (116-143)/(59-86) 136/60     Weight: 68 kg (150 lb)  Body mass index is 27.44 kg/m².  Body surface area is 1.72 meters squared.    No intake or output data in the 24 hours ending 04/09/18 1420    Physical Exam   Constitutional: She appears well-developed and well-nourished. No distress.   HENT:   Head: Normocephalic and atraumatic.   Eyes: EOM are normal. Right eye exhibits no discharge. Left eye exhibits no discharge.   Neck: Neck supple. JVD present.   Cardiovascular: Normal rate, regular rhythm, normal heart sounds and intact distal pulses.    No murmur heard.  Pulmonary/Chest: Effort normal and breath sounds normal. No respiratory distress. She has no wheezes. She has no rales.   Abdominal: Soft. Bowel sounds are normal. She exhibits no distension. There is no tenderness. There is no guarding.   Musculoskeletal: She exhibits edema (1+ in the lower extremities. ).   Neurological: She is alert.   She is oriented to person, place and somewhat to time. She has moments of confusion.    Skin: Skin is warm and dry. She is not diaphoretic. There is pallor.   Psychiatric: She has a normal mood and affect.   Vitals reviewed.      Significant Labs:   CBC:   Recent Labs  Lab 04/09/18  0925   WBC 12.33   HGB 9.1*   HCT 30.0*       and CMP:   Recent Labs  Lab 04/09/18 0925      K 3.7   *   CO2 17*      BUN 30*   CREATININE 1.8*   CALCIUM 8.5*   PROT 5.9*   ALBUMIN 2.4*   BILITOT 0.5   ALKPHOS 93   AST 26   ALT 15   ANIONGAP 10   EGFRNONAA  27.5*       Diagnostic Results:  I have reviewed all pertinent imaging results/findings within the past 24 hours.    Assessment/Plan:     * Community acquired pneumonia    Patient presenting with worsening non productive cough and SOB. CXR with abnormal infiltration vs congestion. Her WBC is 12.33 K.  She is not on home oxygen. She was on 2-4 L of oxygen in the ED and her sats are in the lower 90s%.   - She received one dose of 2 g ceftriaxone IV     Plan:   - Switch to moxifloxacin   - wean oxygen as tolerated.   - Will do 2D echo to rule our heart failure.               Essential hypertension    - will continue her home dose of amlodipine 5 mg daily.         Seizure disorder    - Will continue her home dose of Keppra.         History of stroke    She had a stroke in October/17.   - Will continue Lipitor 40 daily.         Anticoagulant long-term use    She is on Xeralto for DVT.   - Will continue her home dose of Xeralto.         History of breast cancer    - See HPI for more details.             Emily Phillip MD  Hematology/Oncology  [facility]        ATTENDING NOTE, ONCOLOGY INPATIENT TEAM    As above; events of last 24 hours noted.  Patient seen and examined in the ED, chart reviewed.  We will admit for futher evaluation and management.    Og Pierce MD

## 2018-04-09 NOTE — HPI
72 year old female with DVT on Xeralto , Breast CA (1994), HTN, pacreatic CA (1998), meningioma s/p resection,  seizure disorder on Keppra, lung cancer in Feb 2016 with mets to brain and R hip, stroke in 10/17 with no residual deficit, and deppression. She presented to the ED with 2 days of fatigue and subjective fevers.   On the weekend she was feeling increasingly tired and weak. She was ambulating with assistance to the bathroom which is not her baseline. She was more short of breath and she has a dry cough that is worse than usual. She reports orthopnea due to bad coughing spills when she lay flat. She also reports some legs swellings, palpitations, and nausea. Her temp at home was 99.3.   She denies any change in appetite, urinary sx, diarrhea, wounds, rashes, or mouth sores.        Oncologic history from last clinic note:   Ms. Naty St was admitted to the hospital between 01/25/2016 and 01/28/2016. She has a history of pancreatic cancer 17 years ago, breast cancer and meningioma, presented to the hospital complaining of loss of consciousness. The patient apparently was in her normal state of health and she stood up to go to the bathroom and fell to the floor. The patient's daughter helped the mother up in the bathroom and noted that her mother's upper extremities were shaking and eye rolling. No reports of bowel or bladder incontinence, tongue biting or rolling. The second episode lasted about four minutes and she was extremely lethargic following that. Apparently, the patient had a meningioma resection done in the past; however, recently, underwent neurosurgical resection with Dr. 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    Additional immunohistochemical stains were performed which revealed the tumor cells to be are positive for TTF1 and negative for ER and GCDFP. The morphology and TTF1  "positivity are most consistent with lung primary.    Of note, imaging scan at the end of January 2016 revealed a mass in the lung at 2 cm in the medial aspect of the apical segment of the right upper lobe abutting the mediastinum at the level of the azygous vein and abutting and possibly encasing the segmental bronchi and vessels of the apical segment of the right upper lobe and no pleural fluid was present. Also, there is an enlarged right paratracheal lymph node. No evidence of any metastatic disease at the pancreatic site with postoperative changes post Whipple disease  Her PET Scan from 2/15/16 reveal "Hypermetabolic mass in the right lung apex consistent with a primary malignancy. Hypermetabolic mediastinal lymph nodes consistent with metastatic disease. Right sacral hypermetabolic lesion consistent with metastatic disease, noting additional mildly sclerotic lesions in multiple vertebral bodies which do not demonstrate abnormal hypermetabolism  She underwent IR bone biopsy which revealed metastatic adenocarcinoma of lung origin. 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  She has completed focal RT to brain lesions in March 2016.    PET scan from 6/6/16 shows "Dramatic almost complete response to therapy."  Lovenox started after US lower ext 6/7/16 shows Acute complete occlusion of one of the left posterior tibial vein.Remote partial thrombus of the proximal left superficial femoral vein.  She is on Tarceva.   12/6/16 PET scan reveals "Stable right upper lobe lesion and right hilar lymph node. No new lesions identified. Trace left pleural effusion".  1/27/17 Renal u/s "Medical renal disease. Nonobstructive right nephrolithiasis"  1/31/17 PET - "Right upper lobe nodule and right hilar lymph node similar and very low grade activity.  There is no definite evidence of recurrence."   11/9/17 PET "In this patient with history of lung cancer, there is interval increase in size and hypermetabolism of a right upper lobe " "lung lesion concerning for recurrent disease. Additionally, there is interval appearance of a hypermetabolic paratracheal lymph node suspicious for metastatic disease"   She progressed on Tarceva She underwent EBUS on 12/5/17. Pathology revealed adenocarcinoma but T790m could not be done as quantity was not insufficient. Her PET scan from 1/30/18 revealed The patient's previously identified abnormal lesions is slightly greater uptake of FDG on today's study compared with prior exam. These findings are concerning for progression of disease"  "

## 2018-04-09 NOTE — ED NOTES
APPEARANCE: awake and alert in NAD. Pt reports fatigue x 2 days.   SKIN: warm, dry and intact. No breakdown or bruising.  MUSCULOSKELETAL: Patient moving all extremities spontaneously, no obvious swelling or deformities noted. Ambulates independently.  RESPIRATORY: no shortness of breath.   CARDIAC: heart tones normal. Regular rate and rhythm; 2+ distal pulses; 1+ bilateral leg swelling.   ABDOMEN: S/ND/NT, normoactive bowel sounds present in all four quadrants. Normal stool pattern.  : voids spontaneously without difficulty.  Neurologic: AAO x 4; follows commands equal strength in all extremities; denies numbness/tingling.

## 2018-04-10 PROBLEM — I51.89 DIASTOLIC DYSFUNCTION: Status: ACTIVE | Noted: 2018-04-10

## 2018-04-10 LAB
ABO + RH BLD: NORMAL
ALBUMIN SERPL BCP-MCNC: 1.8 G/DL
ALLENS TEST: ABNORMAL
ALP SERPL-CCNC: 73 U/L
ALT SERPL W/O P-5'-P-CCNC: 10 U/L
ANION GAP SERPL CALC-SCNC: 5 MMOL/L
AST SERPL-CCNC: 24 U/L
BASOPHILS # BLD AUTO: 0.03 K/UL
BASOPHILS NFR BLD: 0.3 %
BILIRUB SERPL-MCNC: 0.4 MG/DL
BLD GP AB SCN CELLS X3 SERPL QL: NORMAL
BLD PROD TYP BPU: NORMAL
BLOOD UNIT EXPIRATION DATE: NORMAL
BLOOD UNIT TYPE CODE: 8400
BLOOD UNIT TYPE: NORMAL
BUN SERPL-MCNC: 24 MG/DL
CALCIUM SERPL-MCNC: 7 MG/DL
CHLORIDE SERPL-SCNC: 113 MMOL/L
CO2 SERPL-SCNC: 18 MMOL/L
CODING SYSTEM: NORMAL
CREAT SERPL-MCNC: 1.7 MG/DL
DELSYS: ABNORMAL
DIFFERENTIAL METHOD: ABNORMAL
DISPENSE STATUS: NORMAL
EOSINOPHIL # BLD AUTO: 0 K/UL
EOSINOPHIL NFR BLD: 0.4 %
ERYTHROCYTE [DISTWIDTH] IN BLOOD BY AUTOMATED COUNT: 18.9 %
ERYTHROCYTE [SEDIMENTATION RATE] IN BLOOD BY WESTERGREN METHOD: 14 MM/H
EST. GFR  (AFRICAN AMERICAN): 34 ML/MIN/1.73 M^2
EST. GFR  (NON AFRICAN AMERICAN): 29.5 ML/MIN/1.73 M^2
FIO2: 55
GLUCOSE SERPL-MCNC: 102 MG/DL
HCO3 UR-SCNC: 19.5 MMOL/L (ref 24–28)
HCT VFR BLD AUTO: 24.3 %
HGB BLD-MCNC: 7.3 G/DL
IMM GRANULOCYTES # BLD AUTO: 0.11 K/UL
IMM GRANULOCYTES NFR BLD AUTO: 1 %
LACTATE SERPL-SCNC: 1 MMOL/L
LYMPHOCYTES # BLD AUTO: 1.3 K/UL
LYMPHOCYTES NFR BLD: 11.2 %
MAGNESIUM SERPL-MCNC: 1.7 MG/DL
MCH RBC QN AUTO: 25.3 PG
MCHC RBC AUTO-ENTMCNC: 30 G/DL
MCV RBC AUTO: 84 FL
MODE: ABNORMAL
MONOCYTES # BLD AUTO: 1.8 K/UL
MONOCYTES NFR BLD: 15.6 %
NEUTROPHILS # BLD AUTO: 8.2 K/UL
NEUTROPHILS NFR BLD: 71.5 %
NRBC BLD-RTO: 0 /100 WBC
PCO2 BLDA: 32 MMHG (ref 35–45)
PH SMN: 7.39 [PH] (ref 7.35–7.45)
PHOSPHATE SERPL-MCNC: 2.9 MG/DL
PLATELET # BLD AUTO: 184 K/UL
PMV BLD AUTO: 10.4 FL
PO2 BLDA: 58 MMHG (ref 80–100)
POC BE: -5 MMOL/L
POC SATURATED O2: 90 % (ref 95–100)
POC TCO2: 20 MMOL/L (ref 23–27)
POTASSIUM SERPL-SCNC: 3.7 MMOL/L
PROT SERPL-MCNC: 4.5 G/DL
RBC # BLD AUTO: 2.89 M/UL
SAMPLE: ABNORMAL
SITE: ABNORMAL
SODIUM SERPL-SCNC: 136 MMOL/L
SP02: 90
TRANS ERYTHROCYTES VOL PATIENT: NORMAL ML
WBC # BLD AUTO: 11.42 K/UL

## 2018-04-10 PROCEDURE — P9021 RED BLOOD CELLS UNIT: HCPCS

## 2018-04-10 PROCEDURE — 36600 WITHDRAWAL OF ARTERIAL BLOOD: CPT

## 2018-04-10 PROCEDURE — 97165 OT EVAL LOW COMPLEX 30 MIN: CPT

## 2018-04-10 PROCEDURE — 86920 COMPATIBILITY TEST SPIN: CPT

## 2018-04-10 PROCEDURE — 80053 COMPREHEN METABOLIC PANEL: CPT

## 2018-04-10 PROCEDURE — 25000003 PHARM REV CODE 250: Performed by: STUDENT IN AN ORGANIZED HEALTH CARE EDUCATION/TRAINING PROGRAM

## 2018-04-10 PROCEDURE — 86644 CMV ANTIBODY: CPT

## 2018-04-10 PROCEDURE — 83605 ASSAY OF LACTIC ACID: CPT

## 2018-04-10 PROCEDURE — 85025 COMPLETE CBC W/AUTO DIFF WBC: CPT

## 2018-04-10 PROCEDURE — 20600001 HC STEP DOWN PRIVATE ROOM

## 2018-04-10 PROCEDURE — 36415 COLL VENOUS BLD VENIPUNCTURE: CPT

## 2018-04-10 PROCEDURE — 99900035 HC TECH TIME PER 15 MIN (STAT)

## 2018-04-10 PROCEDURE — 83735 ASSAY OF MAGNESIUM: CPT

## 2018-04-10 PROCEDURE — 99233 SBSQ HOSP IP/OBS HIGH 50: CPT | Mod: ,,, | Performed by: INTERNAL MEDICINE

## 2018-04-10 PROCEDURE — 84100 ASSAY OF PHOSPHORUS: CPT

## 2018-04-10 PROCEDURE — 36430 TRANSFUSION BLD/BLD COMPNT: CPT

## 2018-04-10 PROCEDURE — 82803 BLOOD GASES ANY COMBINATION: CPT

## 2018-04-10 PROCEDURE — 86850 RBC ANTIBODY SCREEN: CPT

## 2018-04-10 PROCEDURE — 97161 PT EVAL LOW COMPLEX 20 MIN: CPT

## 2018-04-10 RX ORDER — LORAZEPAM 1 MG/1
1 TABLET ORAL EVERY 12 HOURS PRN
Status: DISCONTINUED | OUTPATIENT
Start: 2018-04-10 | End: 2018-04-16 | Stop reason: HOSPADM

## 2018-04-10 RX ORDER — HYDROCODONE BITARTRATE AND ACETAMINOPHEN 500; 5 MG/1; MG/1
TABLET ORAL
Status: DISCONTINUED | OUTPATIENT
Start: 2018-04-10 | End: 2018-04-16 | Stop reason: HOSPADM

## 2018-04-10 RX ORDER — ACETAMINOPHEN 325 MG/1
650 TABLET ORAL
Status: COMPLETED | OUTPATIENT
Start: 2018-04-10 | End: 2018-04-10

## 2018-04-10 RX ORDER — DIPHENHYDRAMINE HCL 25 MG
25 CAPSULE ORAL
Status: COMPLETED | OUTPATIENT
Start: 2018-04-10 | End: 2018-04-10

## 2018-04-10 RX ADMIN — SODIUM BICARBONATE 650 MG TABLET 650 MG: at 03:04

## 2018-04-10 RX ADMIN — MOXIFLOXACIN HYDROCHLORIDE 400 MG: 400 TABLET, FILM COATED ORAL at 08:04

## 2018-04-10 RX ADMIN — APIXABAN 5 MG: 2.5 TABLET, FILM COATED ORAL at 05:04

## 2018-04-10 RX ADMIN — ATORVASTATIN CALCIUM 40 MG: 20 TABLET, FILM COATED ORAL at 08:04

## 2018-04-10 RX ADMIN — SODIUM CHLORIDE 1000 ML: 0.9 INJECTION, SOLUTION INTRAVENOUS at 05:04

## 2018-04-10 RX ADMIN — LORAZEPAM 1 MG: 1 TABLET ORAL at 09:04

## 2018-04-10 RX ADMIN — DIPHENHYDRAMINE HYDROCHLORIDE 25 MG: 25 CAPSULE ORAL at 11:04

## 2018-04-10 RX ADMIN — LEVETIRACETAM 750 MG: 750 TABLET ORAL at 08:04

## 2018-04-10 RX ADMIN — LEVETIRACETAM 750 MG: 750 TABLET ORAL at 09:04

## 2018-04-10 RX ADMIN — AMITRIPTYLINE HYDROCHLORIDE 50 MG: 25 TABLET, FILM COATED ORAL at 09:04

## 2018-04-10 RX ADMIN — PANCRELIPASE 1 CAPSULE: 60000; 12000; 38000 CAPSULE, DELAYED RELEASE PELLETS ORAL at 12:04

## 2018-04-10 RX ADMIN — SODIUM BICARBONATE 650 MG TABLET 650 MG: at 08:04

## 2018-04-10 RX ADMIN — PANCRELIPASE 1 CAPSULE: 60000; 12000; 38000 CAPSULE, DELAYED RELEASE PELLETS ORAL at 08:04

## 2018-04-10 RX ADMIN — ACETAMINOPHEN 650 MG: 325 TABLET ORAL at 12:04

## 2018-04-10 RX ADMIN — ACETAMINOPHEN 650 MG: 325 TABLET ORAL at 11:04

## 2018-04-10 RX ADMIN — SODIUM BICARBONATE 650 MG TABLET 650 MG: at 09:04

## 2018-04-10 NOTE — PROGRESS NOTES
04/10/18 0527   Vital Signs   BP (!) 89/54   notified resident of above bp. Order given to administer 1 liter normal saline bolus

## 2018-04-10 NOTE — ASSESSMENT & PLAN NOTE
Patient presenting with worsening non productive cough and SOB. CXR with abnormal infiltration vs congestion. Her WBC is 12.33 K.  She is not on home oxygen. She was on 2-4 L of oxygen in the ED and her sats are in the lower 90s%.   - She received one dose of 2 g ceftriaxone IV  - 4/10 she is on 5 L nc of oxygen.  She spike a fever of 101.2 last night.     Plan:   - Continue moxifloxacin   - wean oxygen as tolerated.

## 2018-04-10 NOTE — PROGRESS NOTES
[facility]  Hematology/Oncology  Progress Note    Patient Name: Naty St  Admission Date: 4/9/2018  Hospital Length of Stay: 0 days  Code Status: Full Code     Subjective:     HPI:  72 year old female with DVT on Xarelto , Breast CA (1994), HTN, pacreatic CA (1998), meningioma s/p resection,  seizure disorder on Keppra, lung cancer in Feb 2016 with mets to brain and R hip, stroke in 10/17 with no residual deficit, and deppression. She presented to the ED with 2 days of fatigue and subjective fevers.   On the weekend she was feeling increasingly tired and weak. She was ambulating with assistance to the bathroom which is not her baseline. She was more short of breath and she has a dry cough that is worse than usual. She reports orthopnea due to bad coughing spills when she lay flat. She also reports some legs swellings, palpitations, and nausea. Her temp at home was 99.3.   She denies any change in appetite, urinary sx, diarrhea, wounds, rashes, or mouth sores.        Oncologic history from last clinic note:   Ms. Naty St was admitted to the hospital between 01/25/2016 and 01/28/2016. She has a history of pancreatic cancer 17 years ago, breast cancer and meningioma, presented to the hospital complaining of loss of consciousness. The patient apparently was in her normal state of health and she stood up to go to the bathroom and fell to the floor. The patient's daughter helped the mother up in the bathroom and noted that her mother's upper extremities were shaking and eye rolling. No reports of bowel or bladder incontinence, tongue biting or rolling. The second episode lasted about four minutes and she was extremely lethargic following that. Apparently, the patient had a meningioma resection done in the past; however, recently, underwent neurosurgical resection with Dr. Ferrera on 01/12/2016 and pathology from that revealed malignant neoplasm with multiple features pointing towards  "metastatic papillary serous adenocarcinoma.    Additional immunohistochemical stains were performed which revealed the tumor cells to be are positive for TTF1 and negative for ER and GCDFP. The morphology and TTF1 positivity are most consistent with lung primary.    Of note, imaging scan at the end of January 2016 revealed a mass in the lung at 2 cm in the medial aspect of the apical segment of the right upper lobe abutting the mediastinum at the level of the azygous vein and abutting and possibly encasing the segmental bronchi and vessels of the apical segment of the right upper lobe and no pleural fluid was present. Also, there is an enlarged right paratracheal lymph node. No evidence of any metastatic disease at the pancreatic site with postoperative changes post Whipple disease  Her PET Scan from 2/15/16 reveal "Hypermetabolic mass in the right lung apex consistent with a primary malignancy. Hypermetabolic mediastinal lymph nodes consistent with metastatic disease. Right sacral hypermetabolic lesion consistent with metastatic disease, noting additional mildly sclerotic lesions in multiple vertebral bodies which do not demonstrate abnormal hypermetabolism  She underwent IR bone biopsy which revealed metastatic adenocarcinoma of lung origin. 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  She has completed focal RT to brain lesions in March 2016.    PET scan from 6/6/16 shows "Dramatic almost complete response to therapy."  Lovenox started after US lower ext 6/7/16 shows Acute complete occlusion of one of the left posterior tibial vein.Remote partial thrombus of the proximal left superficial femoral vein.  She is on Tarceva.   12/6/16 PET scan reveals "Stable right upper lobe lesion and right hilar lymph node. No new lesions identified. Trace left pleural effusion".  1/27/17 Renal u/s "Medical renal disease. Nonobstructive right nephrolithiasis"  1/31/17 PET - "Right upper lobe nodule and right hilar lymph node similar " "and very low grade activity.  There is no definite evidence of recurrence."   11/9/17 PET "In this patient with history of lung cancer, there is interval increase in size and hypermetabolism of a right upper lobe lung lesion concerning for recurrent disease. Additionally, there is interval appearance of a hypermetabolic paratracheal lymph node suspicious for metastatic disease"   She progressed on Tarceva She underwent EBUS on 12/5/17. Pathology revealed adenocarcinoma but T790m could not be done as quantity was not insufficient. Her PET scan from 1/30/18 revealed The patient's previously identified abnormal lesions is slightly greater uptake of FDG on today's study compared with prior exam. These findings are concerning for progression of disease"    Interval History: she spiked a fever last night. She is on 5 L nc. Will continue moxifloxacin.     Oncology Treatment Plan:   [No treatment plan]    Medications:  Continuous Infusions:  Scheduled Meds:   amitriptyline  50 mg Oral QHS    atorvastatin  40 mg Oral Daily    levETIRAcetam  750 mg Oral BID    lipase-protease-amylase 12,000-38,000-60,000 units  1 capsule Oral TID WM    moxifloxacin  400 mg Oral Daily    rivaroxaban  20 mg Oral Daily with dinner    sodium bicarbonate  650 mg Oral TID    sodium chloride 0.9%  500 mL Intravenous Once     PRN Meds:acetaminophen, albuterol-ipratropium 2.5mg-0.5mg/3mL, dextrose 50%, dextrose 50%, glucagon (human recombinant), glucose, glucose, ondansetron, promethazine (PHENERGAN) IVPB, sodium chloride 0.9%     Review of Systems   Constitutional: Positive for activity change. Negative for appetite change, fatigue and fever.   HENT: Negative for congestion and sinus pain.    Respiratory: Positive for shortness of breath. Negative for cough.    Cardiovascular: Positive for leg swelling. Negative for chest pain and palpitations.   Gastrointestinal: Negative for abdominal distention, abdominal pain, diarrhea, nausea and " vomiting.   Genitourinary: Negative for decreased urine volume and dysuria.   Skin: Negative for rash and wound.   Neurological: Negative for dizziness and headaches.     Objective:     Vital Signs (Most Recent):  Temp: 98.2 °F (36.8 °C) (04/10/18 0527)  Pulse: 80 (04/10/18 0709)  Resp: 20 (04/10/18 0527)  BP: (!) 105/56 (04/10/18 0633)  SpO2: 95 % (04/10/18 0633) Vital Signs (24h Range):  Temp:  [95.7 °F (35.4 °C)-101.1 °F (38.4 °C)] 98.2 °F (36.8 °C)  Pulse:  [] 80  Resp:  [16-24] 20  SpO2:  [71 %-100 %] 95 %  BP: ()/(53-86) 105/56     Weight: 68 kg (149 lb 14.6 oz)  Body mass index is 27.42 kg/m².  Body surface area is 1.72 meters squared.      Intake/Output Summary (Last 24 hours) at 04/10/18 0846  Last data filed at 04/10/18 0015   Gross per 24 hour   Intake                0 ml   Output             1300 ml   Net            -1300 ml       Physical Exam   Constitutional: She appears well-developed and well-nourished. No distress.   HENT:   Head: Normocephalic and atraumatic.   Eyes: EOM are normal. Right eye exhibits no discharge. Left eye exhibits no discharge.   Neck: Neck supple.   Cardiovascular: Normal rate, regular rhythm, normal heart sounds and intact distal pulses.    No murmur heard.  Pulmonary/Chest: Effort normal and breath sounds normal. No respiratory distress. She has no wheezes. She has no rales.   Abdominal: Soft. Bowel sounds are normal. She exhibits no distension. There is no tenderness. There is no guarding.   Musculoskeletal: She exhibits edema (1+ in the lower extremities. Lt >Rt).   Neurological: She is alert.   She is oriented to person, place and somewhat to time. She has moments of confusion.    Skin: Skin is warm and dry. She is not diaphoretic. There is pallor.   Psychiatric: She has a normal mood and affect.   Vitals reviewed.      Significant Labs:   CBC:   Recent Labs  Lab 04/09/18  0925 04/10/18  0540   WBC 12.33 11.42   HGB 9.1* 7.3*   HCT 30.0* 24.3*    184     and CMP:   Recent Labs  Lab 04/09/18  0925 04/10/18  0540    136   K 3.7 3.7   * 113*   CO2 17* 18*    102   BUN 30* 24*   CREATININE 1.8* 1.7*   CALCIUM 8.5* 7.0*   PROT 5.9* 4.5*   ALBUMIN 2.4* 1.8*   BILITOT 0.5 0.4   ALKPHOS 93 73   AST 26 24   ALT 15 10   ANIONGAP 10 5*   EGFRNONAA 27.5* 29.5*         Assessment/Plan:     * Community acquired pneumonia    Patient presenting with worsening non productive cough and SOB. CXR with abnormal infiltration vs congestion. Her WBC is 12.33 K.  She is not on home oxygen. She was on 2-4 L of oxygen in the ED and her sats are in the lower 90s%.   - She received one dose of 2 g ceftriaxone IV  - 4/10 she is on 5 L nc of oxygen.  She spike a fever of 101.2 last night.     Plan:   - Continue moxifloxacin   - wean oxygen as tolerated.                 Essential hypertension    - will continue her home dose of amlodipine 5 mg daily.         Anticoagulant long-term use    She is on Xeralto for DVT.   - Her Cr clearance is 26.7, will consider changing it to Apixaban.         Diastolic dysfunction    Patient presented with failure to thrive. There was a concern of new onset Heart failure as she reports DELA CRUZ and orthopnea.   - 2D echo shows diastolic dysfunction and normal EF.   - Will hold on diuresis as her BP is border line.           Seizure disorder    - Will continue her home dose of Keppra.         History of stroke    She had a stroke in October/17.   - Will continue Lipitor 40 daily.         History of breast cancer    - See HPI for more details.                  Emily Phillip MD  Hematology/Oncology  [facility]      ATTENDING NOTE, ONCOLOGY INPATIENT TEAM    As above; events of last 24 hours noted.  Patient seen and examined, chart reviewed.  Appears comfortable, in NAD, states that she feels better.  Lungs are clear to auscultation.  Abdomen is soft, nontender.  Labs reviewed.    PLAN  Follow blood cultures.  Follow CBC and  electrolytes daily.  Continue moxifloxacin.  Transfuse one unit of PRBCs.  Switch to eloquis given her renal dysfunction.  PT/OT to evaluate.  We will follow.    Og Pierce MD

## 2018-04-10 NOTE — PLAN OF CARE
Problem: Patient Care Overview  Goal: Plan of Care Review  Outcome: Ongoing (interventions implemented as appropriate)  tmax 101.1. Pt alert, but forgetful at times. Oxygen at 3 liters via nasal cannula noted. 2 person assistance required to get out of bed onto bed side commode. Ativan given for sleep. Son at bedside.

## 2018-04-10 NOTE — PT/OT/SLP EVAL
Occupational Therapy   Evaluation    Name: Naty St  MRN: [id number]  Admitting Diagnosis:  Community acquired pneumonia      Recommendations:     Discharge Recommendations: nursing facility, skilled (may progress to home with HH)  Discharge Equipment Recommendations:  none  Barriers to discharge:  None    History:     Occupational Profile:  Living Environment: lives in  home with 8 steps to enter  Previous level of function: independent/mod I  Roles and Routines: none stated  Equipment Owned:  bath bench, raised toilet, cane, straight, walker, standard  Assistance upon Discharge: yes    Past Medical History:   Diagnosis Date    Anticoagulant long-term use     Blood clot in vein 06/2016    Breast cancer 1994    Cataract     Hypertension     Pancreatic cancer 1998    Posterior capsular opacification, left eye     Psychiatric problem     Seizures 01/25/2016    Stroke     Therapy        Past Surgical History:   Procedure Laterality Date    BONE BIOSPY  3/9/16    BRAIN SURGERY  1/12/16    tumor removal 1/12/16    BREAST LUMPECTOMY  1998    left    CATARACT EXTRACTION Bilateral 2004    yag OU    CHOLECYSTECTOMY  1998    COLONOSCOPY N/A 11/13/2015    Procedure: COLONOSCOPY;  Surgeon: Alex Carmona MD;  Location: [facility] ([address] [place] [address]);  Service: Endoscopy;  Laterality: N/A;  2 year f/u    cysto and right ureteral stent  4/27/12    ecoli  2010    removal of blockage, done throat, then through the liver.    HYSTERECTOMY  1974    KIDNEY STONE SURGERY  2010--laser    left eswl  6/20/12    OOPHORECTOMY  4/25/2012    Laparoscopic BSO, lysis of adhesions, cystoscopy greater than 35mins     WHIPPLE PROCEDURE W/ LAPAROSCOPY  1998       Subjective     Chief Complaint: none stated  Patient/Family stated goals: return to PLOF  Communicated with: nsg prior to session. Cc with PT  Pain/Comfort:  · Pain Rating 1: 0/10    Patients cultural, spiritual, Islam conflicts given the current situation:  "none    Objective:     Patient found with: peripheral IV, telemetry    General Precautions: Standard, fall   Orthopedic Precautions:    Braces:       Occupational Performance:    Bed Mobility:    · Sup to sit spvn  ·     Functional Mobility/Transfers:  · Sit to stand from bed min assist  · Functional Mobility: CGA with RW x 3 ft x2 in room    Activities of Daily Living:  · LB dress with min assist  · Feeding mod I  · UB dress with gown min assist    Cognitive/Visual Perceptual:  Pt follows commands, oriented to hospital and thought date was the , noted some decreased safety awareness and insight into deficits currently.    Physical Exam:   BUE strength    Patient left in bed with HOB up with call button in reach and  present    [facility] 6 Click:  [facility] Total Score: 19    Treatment & Education:  Pt performed ADL's as above, ambulated short distance with RW as above, educated pt and  on POC, assist needed with OOB and ambulation.    Education:    Assessment:     Naty St is a 72 y.o. female with a medical diagnosis of Community acquired pneumonia.  She presents with mild weakness, motivated to improve.  Performance deficits affecting function are weakness, impaired endurance, impaired self care skills, impaired functional mobilty, gait instability, impaired balance, decreased safety awareness, impaired cardiopulmonary response to activity.      Rehab Prognosis:  good; patient would benefit from acute skilled OT services to address these deficits and reach maximum level of function.         Clinical Decision Makin.  OT Low:  "Pt evaluation falls under low complexity for evaluation coding due to performance deficits noted in 1-3 areas as stated above and 0 co-morbities affecting current functional status. Data obtained from problem focused assessments. No modifications or assistance was required for completion of evaluation. Only brief occupational profile and history review " "completed."     Plan:     Patient to be seen 4 x/week to address the above listed problems via self-care/home management, therapeutic activities, therapeutic exercises  · Plan of Care Expires: 05/10/18  · Plan of Care Reviewed with: patient, spouse    This Plan of care has been discussed with the patient who was involved in its development and understands and is in agreement with the identified goals and treatment plan    GOALS:    Occupational Therapy Goals        Problem: Occupational Therapy Goal    Goal Priority Disciplines Outcome Interventions   Occupational Therapy Goal     OT, PT/OT Ongoing (interventions implemented as appropriate)    Description:  Goals to be met by: 4/24/18     Patient will increase functional independence with ADLs by performing:    UE Dressing with Supervision.  LE Dressing with Supervision.  Grooming while standing with Supervision.  Toileting from toilet with Supervision for hygiene and clothing management.   Toilet transfer to toilet with Supervision.                      Time Tracking:     OT Date of Treatment: 04/10/18  OT Start Time: 0859  OT Stop Time: 0927  OT Total Time (min): 28 min    Billable Minutes:Evaluation 28 min    Lisa Tubbs OT  4/10/2018    "

## 2018-04-10 NOTE — PLAN OF CARE
Problem: Occupational Therapy Goal  Goal: Occupational Therapy Goal  Goals to be met by: 4/24/18     Patient will increase functional independence with ADLs by performing:    UE Dressing with Supervision.  LE Dressing with Supervision.  Grooming while standing with Supervision.  Toileting from toilet with Supervision for hygiene and clothing management.   Toilet transfer to toilet with Supervision.    Outcome: Ongoing (interventions implemented as appropriate)  Goals established.  Lisa Tubbs, OTR

## 2018-04-10 NOTE — ASSESSMENT & PLAN NOTE
Patient presented with failure to thrive. There was a concern of new onset Heart failure as she reports DELA CRUZ and orthopnea.   - 2D echo shows diastolic dysfunction and normal EF.   - Will hold on diuresis as her BP is border line.

## 2018-04-10 NOTE — SUBJECTIVE & OBJECTIVE
Interval History: she spiked a fever last night. She is on 5 L nc. Will continue moxifloxacin.     Oncology Treatment Plan:   [No treatment plan]    Medications:  Continuous Infusions:  Scheduled Meds:   amitriptyline  50 mg Oral QHS    atorvastatin  40 mg Oral Daily    levETIRAcetam  750 mg Oral BID    lipase-protease-amylase 12,000-38,000-60,000 units  1 capsule Oral TID WM    moxifloxacin  400 mg Oral Daily    rivaroxaban  20 mg Oral Daily with dinner    sodium bicarbonate  650 mg Oral TID    sodium chloride 0.9%  500 mL Intravenous Once     PRN Meds:acetaminophen, albuterol-ipratropium 2.5mg-0.5mg/3mL, dextrose 50%, dextrose 50%, glucagon (human recombinant), glucose, glucose, ondansetron, promethazine (PHENERGAN) IVPB, sodium chloride 0.9%     Review of Systems   Constitutional: Positive for activity change. Negative for appetite change, fatigue and fever.   HENT: Negative for congestion and sinus pain.    Respiratory: Positive for shortness of breath. Negative for cough.    Cardiovascular: Positive for leg swelling. Negative for chest pain and palpitations.   Gastrointestinal: Negative for abdominal distention, abdominal pain, diarrhea, nausea and vomiting.   Genitourinary: Negative for decreased urine volume and dysuria.   Skin: Negative for rash and wound.   Neurological: Negative for dizziness and headaches.     Objective:     Vital Signs (Most Recent):  Temp: 98.2 °F (36.8 °C) (04/10/18 0527)  Pulse: 80 (04/10/18 0709)  Resp: 20 (04/10/18 0527)  BP: (!) 105/56 (04/10/18 0633)  SpO2: 95 % (04/10/18 0633) Vital Signs (24h Range):  Temp:  [95.7 °F (35.4 °C)-101.1 °F (38.4 °C)] 98.2 °F (36.8 °C)  Pulse:  [] 80  Resp:  [16-24] 20  SpO2:  [71 %-100 %] 95 %  BP: ()/(53-86) 105/56     Weight: 68 kg (149 lb 14.6 oz)  Body mass index is 27.42 kg/m².  Body surface area is 1.72 meters squared.      Intake/Output Summary (Last 24 hours) at 04/10/18 0814  Last data filed at 04/10/18 0015   Gross per  24 hour   Intake                0 ml   Output             1300 ml   Net            -1300 ml       Physical Exam   Constitutional: She appears well-developed and well-nourished. No distress.   HENT:   Head: Normocephalic and atraumatic.   Eyes: EOM are normal. Right eye exhibits no discharge. Left eye exhibits no discharge.   Neck: Neck supple.   Cardiovascular: Normal rate, regular rhythm, normal heart sounds and intact distal pulses.    No murmur heard.  Pulmonary/Chest: Effort normal and breath sounds normal. No respiratory distress. She has no wheezes. She has no rales.   Abdominal: Soft. Bowel sounds are normal. She exhibits no distension. There is no tenderness. There is no guarding.   Musculoskeletal: She exhibits edema (1+ in the lower extremities. Lt >Rt).   Neurological: She is alert.   She is oriented to person, place and somewhat to time. She has moments of confusion.    Skin: Skin is warm and dry. She is not diaphoretic. There is pallor.   Psychiatric: She has a normal mood and affect.   Vitals reviewed.      Significant Labs:   CBC:   Recent Labs  Lab 04/09/18  0925 04/10/18  0540   WBC 12.33 11.42   HGB 9.1* 7.3*   HCT 30.0* 24.3*    184    and CMP:   Recent Labs  Lab 04/09/18  0925 04/10/18  0540    136   K 3.7 3.7   * 113*   CO2 17* 18*    102   BUN 30* 24*   CREATININE 1.8* 1.7*   CALCIUM 8.5* 7.0*   PROT 5.9* 4.5*   ALBUMIN 2.4* 1.8*   BILITOT 0.5 0.4   ALKPHOS 93 73   AST 26 24   ALT 15 10   ANIONGAP 10 5*   EGFRNONAA 27.5* 29.5*

## 2018-04-10 NOTE — PLAN OF CARE
Problem: Patient Care Overview  Goal: Plan of Care Review  Outcome: Ongoing (interventions implemented as appropriate)  Fall, pressure ulcer and infection precautions continued. Sputum culture pending sample collection. Telemetry continued. Oxygen continued. One unit PRBCs given with premedication of tylenol and benadryl. Patient stable, will continue to monitor.

## 2018-04-10 NOTE — PLAN OF CARE
Problem: Physical Therapy Goal  Goal: Physical Therapy Goal  Goals to be met by: 18     Patient will increase functional independence with mobility by performin. Supine to sit with Modified [place]  2. Sit to stand transfer with Stand-by Assistance  3. Bed to chair transfer with Stand-by Assistance using LRAD as needed.   4. Gait  x 150 feet with Contact Guard Assistance using LRAD as needed.   5. Ascend/descend 8 stair with bilateral Handrails Contact Guard Assistance.   6. Lower extremity exercise program x15 reps, with supervision, in order to increase LE strength and (I) with functional mobility.     Outcome: Ongoing (interventions implemented as appropriate)  PT evaluation complete and appropriate goals established.    Linda Cantu, PT, DPT   4/10/2018  [phone]

## 2018-04-10 NOTE — PT/OT/SLP EVAL
Physical Therapy Evaluation    Patient Name:  Naty St   MRN:  [id number]    Recommendations:     Discharge Recommendations:  nursing facility, skilled (pending progress and ability to perform stair training)   Discharge Equipment Recommendations:  (TBD)   Barriers to discharge: Inaccessible home (8 DENNY)    Assessment:     Naty St is a 72 y.o. female admitted with a medical diagnosis of Community acquired pneumonia.  She presents with the following impairments/functional limitations:  weakness, impaired functional mobilty, impaired balance, impaired endurance, impaired self care skills, gait instability, impaired cardiopulmonary response to activity, decreased safety awareness. Pt able to participate in functional mobility this session. Gait limited to minimal distance in room with RW and CGA needed throughout 2* generalized weakness and decreased balance. Further (I) with mobility limited 2* by impaired endurance, weakness, decreased balance. Pt would benefit from skilled acute PT in order to address current deficits and progress functional mobility. Currently recommending SNF upon d/c 2* limited mobility and inaccessible home environment (8 DENNY); however, pt may progress to  pending ability to perform stair training prior to d/c.     Rehab Prognosis:  good; patient would benefit from acute skilled PT services to address these deficits and reach maximum level of function.      Recent Surgery: * No surgery found *      Plan:     During this hospitalization, patient to be seen 4 x/week to address the above listed problems via gait training, therapeutic activities, therapeutic exercises  · Plan of Care Expires:  05/09/18   Plan of Care Reviewed with: patient, spouse    Subjective     Communicated with RN prior to session.  Patient found supine upon PT entry to room, agreeable to evaluation.      Chief Complaint: none noted  Patient comments/goals: return home   Pain/Comfort:  · Pain Rating  1: 0/10    Patients cultural, spiritual, Sabianist conflicts given the current situation: none noted     Living Environment:  Pt lives with  in a 1SH with 8 DENNY, B handrails (but unable to reach both rails at same time). Pt reports being (I) with ambulation and ADLs PTA.   Prior to admission, patients level of function was indep (per pt).  Patient has the following equipment: bath bench, wheelchair, walker, standard, raised toilet, cane, straight (was not using PTA). Upon discharge, patient will have assistance from .    Objective:     Patient found with: telemetry, peripheral IV, oxygen     General Precautions: Standard, fall   Orthopedic Precautions:N/A   Braces: N/A     Exams:  · Cognitive Exam:  Patient is oriented to Person, Place and Situation and follows 100% of two-step commands   · Postural Exam:  Patient presented with the following abnormalities:    · -       Rounded shoulders  · Sensation:    · -       Intact  · RLE ROM: WFL  · RLE Strength: WFL  · LLE ROM: WFL  · LLE Strength: WFL    Functional Mobility:  · Bed Mobility:     · Supine to Sit: supervision and with HOB elevated  · Sit to Supine: supervision and with HOB elevated  · Transfers:     · Sit to Stand:  minimum assistance with rolling walker  · Gait: 3 ft with RW and CGA   · Mild instability, decreased step length, impaired weight-shifting ability     AM-PAC 6 CLICK MOBILITY  Total Score:17       Therapeutic Activities and Exercises:   PT evaluation complete and appropriate goals established.  White board updated.  RW delivered to pt's room for use during hospital admission. RN notified and educated on level of assist needed for transfers.    Patient left supine (per RN request) with all lines intact, call button in reach, bed alarm on, RN notified and pt's  present.    GOALS:    Physical Therapy Goals        Problem: Physical Therapy Goal    Goal Priority Disciplines Outcome Goal Variances Interventions   Physical Therapy  Goal     PT/OT, PT Ongoing (interventions implemented as appropriate)     Description:  Goals to be met by: 18     Patient will increase functional independence with mobility by performin. Supine to sit with Modified [place]  2. Sit to stand transfer with Stand-by Assistance  3. Bed to chair transfer with Stand-by Assistance using LRAD as needed.   4. Gait  x 150 feet with Contact Guard Assistance using LRAD as needed.   5. Ascend/descend 8 stair with bilateral Handrails Contact Guard Assistance.   6. Lower extremity exercise program x15 reps, with supervision, in order to increase LE strength and (I) with functional mobility.                       History:     Past Medical History:   Diagnosis Date    Anticoagulant long-term use     Blood clot in vein 2016    Breast cancer     Cataract     Hypertension     Pancreatic cancer     Posterior capsular opacification, left eye     Psychiatric problem     Seizures 2016    Stroke     Therapy        Past Surgical History:   Procedure Laterality Date    BONE BIOSPY  3/9/16    BRAIN SURGERY  16    tumor removal 16    BREAST LUMPECTOMY      left    CATARACT EXTRACTION Bilateral 2004    yag OU    CHOLECYSTECTOMY      COLONOSCOPY N/A 2015    Procedure: COLONOSCOPY;  Surgeon: Alex Carmona MD;  Location: [facility] ([address] [place] [address]);  Service: Endoscopy;  Laterality: N/A;  2 year f/u    cysto and right ureteral stent  12    ecoli      removal of blockage, done throat, then through the liver.    HYSTERECTOMY  1974    KIDNEY STONE SURGERY  --laser    left eswl  12    OOPHORECTOMY  2012    Laparoscopic BSO, lysis of adhesions, cystoscopy greater than 35mins     WHIPPLE PROCEDURE W/ LAPAROSCOPY         Clinical Decision Making:     History  Co-morbidities and personal factors that may impact the plan of care Examination  Body Structures and Functions, activity limitations and  participation restrictions that may impact the plan of care Clinical Presentation   Decision Making/ Complexity Score   Co-morbidities:   [] Time since onset of injury / illness / exacerbation  [x] Status of current condition  []Patient's cognitive status and safety concerns    [x] Multiple Medical Problems (see med hx)  Personal Factors:   [x] Patient's age  [] Prior Level of function   [x] Patient's home situation (environment and family support)  [] Patient's level of motivation  [] Expected progression of patient      HISTORY:(criteria)    [] 37825 - no personal factors/history    [] 74080 - has 1-2 personal factor/comorbidity     [x] 26529 - has >3 personal factor/comorbidity     Body Regions:  [] Objective examination findings  [] Head     []  Neck  [] Trunk   [] Upper Extremity  [] Lower Extremity    Body Systems:  [] For communication ability, affect, cognition, language, and learning style: the assessment of the ability to make needs known, consciousness, orientation (person, place, and time), expected emotional /behavioral responses, and learning preferences (eg, learning barriers, education  needs)  [x] For the neuromuscular system: a general assessment of gross coordinated movement (eg, balance, gait, locomotion, transfers, and transitions) and motor function  (motor control and motor learning)  [x] For the musculoskeletal system: the assessment of gross symmetry, gross range of motion, gross strength, height, and weight  [] For the integumentary system: the assessment of pliability(texture), presence of scar formation, skin color, and skin integrity  [x] For cardiovascular/pulmonary system: the assessment of heart rate, respiratory rate, blood pressure, and edema     Activity limitations:    [] Patient's cognitive status and saf ety concerns          [x] Status of current condition      [] Weight bearing restriction  [] Cardiopulmunary Restriction    Participation Restrictions:   [] Goals and goal  agreement with the patient     [] Rehab potential (prognosis) and probable outcome      Examination of Body System: (criteria)    [] 50511 - addressing 1-2 elements    [] 66275 - addressing a total of 3 or more elements     [x] 84462 -  Addressing a total of 4 or more elements         Clinical Presentation: (criteria)  Stable - 82301     On examination of body system using standardized tests and measures patient presents with 4 or more elements from any of the following: body structures and functions, activity limitations, and/or participation restrictions.  Leading to a clinical presentation that is considered stable and/or uncomplicated                              Clinical Decision Making  (Eval Complexity):  Low- 52148     Time Tracking:     PT Received On: 04/10/18  PT Start Time: 0859     PT Stop Time: 0927  PT Total Time (min): 28 min     Billable Minutes: Evaluation 28  (co-eval with OT)    Linda aCntu, PT, DPT   4/10/2018  [phone]

## 2018-04-11 ENCOUNTER — TELEPHONE (OUTPATIENT)
Dept: HEMATOLOGY/ONCOLOGY | Facility: CLINIC | Age: 73
End: 2018-04-11

## 2018-04-11 PROBLEM — D64.9 ANEMIA: Status: ACTIVE | Noted: 2018-04-11

## 2018-04-11 LAB
ALBUMIN SERPL BCP-MCNC: 2.1 G/DL
ALP SERPL-CCNC: 106 U/L
ALT SERPL W/O P-5'-P-CCNC: 15 U/L
ANION GAP SERPL CALC-SCNC: 7 MMOL/L
AST SERPL-CCNC: 37 U/L
BASOPHILS # BLD AUTO: 0.03 K/UL
BASOPHILS NFR BLD: 0.2 %
BILIRUB SERPL-MCNC: 0.8 MG/DL
BUN SERPL-MCNC: 23 MG/DL
CALCIUM SERPL-MCNC: 7.9 MG/DL
CHLORIDE SERPL-SCNC: 111 MMOL/L
CO2 SERPL-SCNC: 20 MMOL/L
CREAT SERPL-MCNC: 1.9 MG/DL
DIFFERENTIAL METHOD: ABNORMAL
EOSINOPHIL # BLD AUTO: 0.1 K/UL
EOSINOPHIL NFR BLD: 0.4 %
ERYTHROCYTE [DISTWIDTH] IN BLOOD BY AUTOMATED COUNT: 18.6 %
EST. GFR  (AFRICAN AMERICAN): 29.7 ML/MIN/1.73 M^2
EST. GFR  (NON AFRICAN AMERICAN): 25.8 ML/MIN/1.73 M^2
GLUCOSE SERPL-MCNC: 132 MG/DL
HCT VFR BLD AUTO: 32 %
HGB BLD-MCNC: 9.9 G/DL
IMM GRANULOCYTES # BLD AUTO: 0.17 K/UL
IMM GRANULOCYTES NFR BLD AUTO: 1.1 %
LYMPHOCYTES # BLD AUTO: 1.4 K/UL
LYMPHOCYTES NFR BLD: 8.6 %
MAGNESIUM SERPL-MCNC: 1.9 MG/DL
MCH RBC QN AUTO: 26.1 PG
MCHC RBC AUTO-ENTMCNC: 30.9 G/DL
MCV RBC AUTO: 84 FL
MONOCYTES # BLD AUTO: 1.8 K/UL
MONOCYTES NFR BLD: 11 %
NEUTROPHILS # BLD AUTO: 12.5 K/UL
NEUTROPHILS NFR BLD: 78.7 %
NRBC BLD-RTO: 0 /100 WBC
PHOSPHATE SERPL-MCNC: 2.4 MG/DL
PLATELET # BLD AUTO: 265 K/UL
PMV BLD AUTO: 11 FL
POTASSIUM SERPL-SCNC: 3.7 MMOL/L
PROT SERPL-MCNC: 5.5 G/DL
RBC # BLD AUTO: 3.79 M/UL
SODIUM SERPL-SCNC: 138 MMOL/L
VANCOMYCIN SERPL-MCNC: 13.4 UG/ML
WBC # BLD AUTO: 15.88 K/UL

## 2018-04-11 PROCEDURE — 99233 SBSQ HOSP IP/OBS HIGH 50: CPT | Mod: ,,, | Performed by: INTERNAL MEDICINE

## 2018-04-11 PROCEDURE — 25000003 PHARM REV CODE 250: Performed by: STUDENT IN AN ORGANIZED HEALTH CARE EDUCATION/TRAINING PROGRAM

## 2018-04-11 PROCEDURE — 27100171 HC OXYGEN HIGH FLOW UP TO 24 HOURS

## 2018-04-11 PROCEDURE — 80053 COMPREHEN METABOLIC PANEL: CPT

## 2018-04-11 PROCEDURE — 85025 COMPLETE CBC W/AUTO DIFF WBC: CPT

## 2018-04-11 PROCEDURE — 83735 ASSAY OF MAGNESIUM: CPT

## 2018-04-11 PROCEDURE — 20600001 HC STEP DOWN PRIVATE ROOM

## 2018-04-11 PROCEDURE — 27100092 HC HIGH FLOW DELIVERY CANNULA

## 2018-04-11 PROCEDURE — 36415 COLL VENOUS BLD VENIPUNCTURE: CPT

## 2018-04-11 PROCEDURE — 84100 ASSAY OF PHOSPHORUS: CPT

## 2018-04-11 PROCEDURE — 63600175 PHARM REV CODE 636 W HCPCS: Performed by: STUDENT IN AN ORGANIZED HEALTH CARE EDUCATION/TRAINING PROGRAM

## 2018-04-11 PROCEDURE — 25000242 PHARM REV CODE 250 ALT 637 W/ HCPCS: Performed by: STUDENT IN AN ORGANIZED HEALTH CARE EDUCATION/TRAINING PROGRAM

## 2018-04-11 PROCEDURE — 94640 AIRWAY INHALATION TREATMENT: CPT

## 2018-04-11 PROCEDURE — 63600175 PHARM REV CODE 636 W HCPCS

## 2018-04-11 PROCEDURE — 80202 ASSAY OF VANCOMYCIN: CPT

## 2018-04-11 PROCEDURE — 94761 N-INVAS EAR/PLS OXIMETRY MLT: CPT

## 2018-04-11 RX ORDER — CEFEPIME HYDROCHLORIDE 2 G/1
2 INJECTION, POWDER, FOR SOLUTION INTRAVENOUS EVERY 24 HOURS
Status: DISCONTINUED | OUTPATIENT
Start: 2018-04-12 | End: 2018-04-11

## 2018-04-11 RX ORDER — DEXAMETHASONE SODIUM PHOSPHATE 4 MG/ML
4 INJECTION, SOLUTION INTRA-ARTICULAR; INTRALESIONAL; INTRAMUSCULAR; INTRAVENOUS; SOFT TISSUE EVERY 12 HOURS
Status: DISCONTINUED | OUTPATIENT
Start: 2018-04-11 | End: 2018-04-13

## 2018-04-11 RX ORDER — FUROSEMIDE 10 MG/ML
40 INJECTION INTRAMUSCULAR; INTRAVENOUS ONCE
Status: COMPLETED | OUTPATIENT
Start: 2018-04-11 | End: 2018-04-11

## 2018-04-11 RX ORDER — FUROSEMIDE 10 MG/ML
INJECTION INTRAMUSCULAR; INTRAVENOUS
Status: COMPLETED
Start: 2018-04-11 | End: 2018-04-11

## 2018-04-11 RX ORDER — IPRATROPIUM BROMIDE AND ALBUTEROL SULFATE 2.5; .5 MG/3ML; MG/3ML
3 SOLUTION RESPIRATORY (INHALATION) EVERY 4 HOURS
Status: DISCONTINUED | OUTPATIENT
Start: 2018-04-11 | End: 2018-04-16

## 2018-04-11 RX ORDER — CEFEPIME HYDROCHLORIDE 1 G/1
1 INJECTION, POWDER, FOR SOLUTION INTRAMUSCULAR; INTRAVENOUS EVERY 24 HOURS
Status: DISCONTINUED | OUTPATIENT
Start: 2018-04-12 | End: 2018-04-14

## 2018-04-11 RX ORDER — CEFEPIME HYDROCHLORIDE 1 G/1
1 INJECTION, POWDER, FOR SOLUTION INTRAMUSCULAR; INTRAVENOUS EVERY 24 HOURS
Status: DISCONTINUED | OUTPATIENT
Start: 2018-04-12 | End: 2018-04-11

## 2018-04-11 RX ORDER — GUAIFENESIN 600 MG/1
600 TABLET, EXTENDED RELEASE ORAL 2 TIMES DAILY
Status: DISCONTINUED | OUTPATIENT
Start: 2018-04-11 | End: 2018-04-16 | Stop reason: HOSPADM

## 2018-04-11 RX ORDER — FUROSEMIDE 10 MG/ML
20 INJECTION INTRAMUSCULAR; INTRAVENOUS ONCE
Status: COMPLETED | OUTPATIENT
Start: 2018-04-11 | End: 2018-04-11

## 2018-04-11 RX ORDER — CEFEPIME HYDROCHLORIDE 1 G/1
1 INJECTION, POWDER, FOR SOLUTION INTRAMUSCULAR; INTRAVENOUS
Status: DISCONTINUED | OUTPATIENT
Start: 2018-04-11 | End: 2018-04-11

## 2018-04-11 RX ADMIN — FUROSEMIDE 20 MG: 10 INJECTION, SOLUTION INTRAMUSCULAR; INTRAVENOUS at 07:04

## 2018-04-11 RX ADMIN — LORAZEPAM 1 MG: 1 TABLET ORAL at 08:04

## 2018-04-11 RX ADMIN — LEVETIRACETAM 750 MG: 750 TABLET ORAL at 08:04

## 2018-04-11 RX ADMIN — IPRATROPIUM BROMIDE AND ALBUTEROL SULFATE 3 ML: .5; 3 SOLUTION RESPIRATORY (INHALATION) at 07:04

## 2018-04-11 RX ADMIN — VANCOMYCIN HYDROCHLORIDE 1000 MG: 1 INJECTION, POWDER, LYOPHILIZED, FOR SOLUTION INTRAVENOUS at 02:04

## 2018-04-11 RX ADMIN — FUROSEMIDE 20 MG: 10 INJECTION INTRAMUSCULAR; INTRAVENOUS at 07:04

## 2018-04-11 RX ADMIN — IPRATROPIUM BROMIDE AND ALBUTEROL SULFATE 3 ML: .5; 3 SOLUTION RESPIRATORY (INHALATION) at 01:04

## 2018-04-11 RX ADMIN — AMITRIPTYLINE HYDROCHLORIDE 50 MG: 25 TABLET, FILM COATED ORAL at 08:04

## 2018-04-11 RX ADMIN — PANCRELIPASE 1 CAPSULE: 60000; 12000; 38000 CAPSULE, DELAYED RELEASE PELLETS ORAL at 06:04

## 2018-04-11 RX ADMIN — SODIUM BICARBONATE 650 MG TABLET 650 MG: at 08:04

## 2018-04-11 RX ADMIN — APIXABAN 10 MG: 2.5 TABLET, FILM COATED ORAL at 08:04

## 2018-04-11 RX ADMIN — DEXAMETHASONE SODIUM PHOSPHATE 4 MG: 4 INJECTION, SOLUTION INTRAMUSCULAR; INTRAVENOUS at 08:04

## 2018-04-11 RX ADMIN — ATORVASTATIN CALCIUM 40 MG: 20 TABLET, FILM COATED ORAL at 10:04

## 2018-04-11 RX ADMIN — FUROSEMIDE 40 MG: 10 INJECTION, SOLUTION INTRAVENOUS at 12:04

## 2018-04-11 RX ADMIN — DEXAMETHASONE SODIUM PHOSPHATE 4 MG: 4 INJECTION, SOLUTION INTRAMUSCULAR; INTRAVENOUS at 12:04

## 2018-04-11 RX ADMIN — SODIUM BICARBONATE 650 MG TABLET 650 MG: at 05:04

## 2018-04-11 RX ADMIN — CEFEPIME 1 G: 1 INJECTION, POWDER, FOR SOLUTION INTRAMUSCULAR; INTRAVENOUS at 01:04

## 2018-04-11 RX ADMIN — APIXABAN 10 MG: 2.5 TABLET, FILM COATED ORAL at 10:04

## 2018-04-11 RX ADMIN — LEVETIRACETAM 750 MG: 750 TABLET ORAL at 10:04

## 2018-04-11 RX ADMIN — GUAIFENESIN 600 MG: 600 TABLET, EXTENDED RELEASE ORAL at 10:04

## 2018-04-11 RX ADMIN — GUAIFENESIN 600 MG: 600 TABLET, EXTENDED RELEASE ORAL at 08:04

## 2018-04-11 RX ADMIN — SODIUM BICARBONATE 650 MG TABLET 650 MG: at 10:04

## 2018-04-11 NOTE — NURSING
RN Proactive Rounding Note  Time of Visit:   Admit Date: 2018  LOS: 0  Code Status: Full Code   Date of Visit: 04/10/2018  : 1945  Age: 72 y.o.  Sex: female  Bed: 6/[facility] A:   MRN: [id number]  Was the patient discharged from an ICU this admission? no   Was the patient discharged from a PACU within last 24 hours? no  Did the patient receive conscious sedation/general anesthesia in last 24 hours? no  Was the patient in the ED within the past 24 hours? no  Was the patient started on NIPPV within the past 24 hours? no  Attending Physician: Og Pierce MD  Primary Service: [facility]      ASSESSMENT:     Abnormal Vital Signs: oxygen saturation which prompted elevated MEWS score  Clinical Issues: respiratory     INTERVENTIONS/ RECOMMENDATIONS:     Patient was put on NRB mask from [place] since she had desat'd.  ABG's were pulled and chest x-ray taken    Discussed plan of care with RN yes; Eva LEE. Had notified me that primary team was notified and pt placed on NRB mask and new orders for abg and chest x-ray were put in    PHYSICIAN ESCALATION:     Yes/No yes; ROSA Velasquez had notified primary team    Orders received and case discussed with Dr meier    Disposition: NRB mask, abg, chest x-ray    FOLLOW-UP/CONTINGENCY:       Call back the Rapid Response Nurse at x [phone]  for additional questions or concerns

## 2018-04-11 NOTE — NURSING
Pt tachypneic at 32rpm, sats 76 on 4 liters oxygen. Increased oxygen to 6 liters, but pt remained in 80s.   Converted to venti mask, but could not get pt's sats above 88%. Call placed to respiratory therapist and resident on call. Orders for stat abgs given. Placed pt on nonrebreather, and sats increased to 96%. ABG performed, and md ordered stat chest xray. abg resuits as follows:  PH 7.392  PCO2 32  PO2 58  HCO3 19.5  TCO2 20  SO2 90

## 2018-04-11 NOTE — ASSESSMENT & PLAN NOTE
Patient presenting with worsening non productive cough and SOB. CXR with abnormal infiltration vs congestion. Her WBC is 12.33 K.  She is not on home oxygen. She was on 2-4 L of oxygen in the ED and her sats are in the lower 90s%.   - She received one dose of 2 g ceftriaxone IV  - 4/10 she is on 5 L nc of oxygen.  She spiked a fever of 101.2 overnight  - 4/11: O2 requirements increased to 10 L comfort flow. CXR with worsening edema and small effusion.   - continue on broad spectrum antibiotics

## 2018-04-11 NOTE — PROGRESS NOTES
Spoke with patient and her  about recent changes in her clinical situation. She has acute hypoxemic respiratory failure. Differential is aspiration pneumonia versus TRALI versus Heart failure but not limited to this.  We discussed continuing treatment for the above. We also discussed goals of care and quality of life.We discussed code status and after extensive pros and cons she has elected to be DNR. I offered to speak with her son Basil and her daughter in [place]. She would like to discuss with them first. Her  was in the room for discussion and he is also agreeable with the DNR and notes that the daughter is coming on Friday.  Mrs. St understands her guarded prognosis    Dr. Almazan was present for this conversation as well.

## 2018-04-11 NOTE — TELEPHONE ENCOUNTER
----- Message from Kim Holt sent at 4/11/2018  8:47 AM CDT -----  Contact: Basil pt son  Pt son calling stating that pt was admitted on Monday. He would like to speak with  about pts current condition.      Basil call back number [phone]

## 2018-04-11 NOTE — PROGRESS NOTES
[facility]  Hematology/Oncology  Progress Note    Patient Name: Naty St  Admission Date: 4/9/2018  Hospital Length of Stay: 1 days  Code Status: Full Code     Subjective:     HPI:  72 year old female with DVT on Xarelto , Breast CA (1994), HTN, pacreatic CA (1998), meningioma s/p resection,  seizure disorder on Keppra, lung cancer in Feb 2016 with mets to brain and R hip, stroke in 10/17 with no residual deficit, and deppression. She presented to the ED with 2 days of fatigue and subjective fevers.   On the weekend she was feeling increasingly tired and weak. She was ambulating with assistance to the bathroom which is not her baseline. She was more short of breath and she has a dry cough that is worse than usual. She reports orthopnea due to bad coughing spills when she lay flat. She also reports some legs swellings, palpitations, and nausea. Her temp at home was 99.3.   She denies any change in appetite, urinary sx, diarrhea, wounds, rashes, or mouth sores.        Oncologic history from last clinic note:   Ms. Naty St was admitted to the hospital between 01/25/2016 and 01/28/2016. She has a history of pancreatic cancer 17 years ago, breast cancer and meningioma, presented to the hospital complaining of loss of consciousness. The patient apparently was in her normal state of health and she stood up to go to the bathroom and fell to the floor. The patient's daughter helped the mother up in the bathroom and noted that her mother's upper extremities were shaking and eye rolling. No reports of bowel or bladder incontinence, tongue biting or rolling. The second episode lasted about four minutes and she was extremely lethargic following that. Apparently, the patient had a meningioma resection done in the past; however, recently, underwent neurosurgical resection with Dr. Ferrera on 01/12/2016 and pathology from that revealed malignant neoplasm with multiple features pointing towards  "metastatic papillary serous adenocarcinoma.    Additional immunohistochemical stains were performed which revealed the tumor cells to be are positive for TTF1 and negative for ER and GCDFP. The morphology and TTF1 positivity are most consistent with lung primary.    Of note, imaging scan at the end of January 2016 revealed a mass in the lung at 2 cm in the medial aspect of the apical segment of the right upper lobe abutting the mediastinum at the level of the azygous vein and abutting and possibly encasing the segmental bronchi and vessels of the apical segment of the right upper lobe and no pleural fluid was present. Also, there is an enlarged right paratracheal lymph node. No evidence of any metastatic disease at the pancreatic site with postoperative changes post Whipple disease  Her PET Scan from 2/15/16 reveal "Hypermetabolic mass in the right lung apex consistent with a primary malignancy. Hypermetabolic mediastinal lymph nodes consistent with metastatic disease. Right sacral hypermetabolic lesion consistent with metastatic disease, noting additional mildly sclerotic lesions in multiple vertebral bodies which do not demonstrate abnormal hypermetabolism  She underwent IR bone biopsy which revealed metastatic adenocarcinoma of lung origin. 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  She has completed focal RT to brain lesions in March 2016.    PET scan from 6/6/16 shows "Dramatic almost complete response to therapy."  Lovenox started after US lower ext 6/7/16 shows Acute complete occlusion of one of the left posterior tibial vein.Remote partial thrombus of the proximal left superficial femoral vein.  She is on Tarceva.   12/6/16 PET scan reveals "Stable right upper lobe lesion and right hilar lymph node. No new lesions identified. Trace left pleural effusion".  1/27/17 Renal u/s "Medical renal disease. Nonobstructive right nephrolithiasis"  1/31/17 PET - "Right upper lobe nodule and right hilar lymph node similar " "and very low grade activity.  There is no definite evidence of recurrence."   11/9/17 PET "In this patient with history of lung cancer, there is interval increase in size and hypermetabolism of a right upper lobe lung lesion concerning for recurrent disease. Additionally, there is interval appearance of a hypermetabolic paratracheal lymph node suspicious for metastatic disease"   She progressed on Tarceva She underwent EBUS on 12/5/17. Pathology revealed adenocarcinoma but T790m could not be done as quantity was not insufficient. Her PET scan from 1/30/18 revealed The patient's previously identified abnormal lesions is slightly greater uptake of FDG on today's study compared with prior exam. These findings are concerning for progression of disease"    Interval History: patient had increased O2 requirements overnight. She is more SOB. She denies chest pain.     Oncology Treatment Plan:   [No treatment plan]    Medications:  Continuous Infusions:  Scheduled Meds:   amitriptyline  50 mg Oral QHS    apixaban  10 mg Oral BID    atorvastatin  40 mg Oral Daily    [START ON 4/12/2018] ceFEPime (MAXIPIME) IVPB  2 g Intravenous Daily    levETIRAcetam  750 mg Oral BID    lipase-protease-amylase 12,000-38,000-60,000 units  1 capsule Oral TID WM    sodium bicarbonate  650 mg Oral TID     PRN Meds:sodium chloride, acetaminophen, albuterol-ipratropium 2.5mg-0.5mg/3mL, dextrose 50%, dextrose 50%, glucagon (human recombinant), glucose, glucose, LORazepam, ondansetron, promethazine (PHENERGAN) IVPB, sodium chloride 0.9%     Review of Systems   Constitutional: Positive for activity change. Negative for appetite change, fatigue and fever.   HENT: Negative for congestion and sinus pain.    Respiratory: Positive for shortness of breath. Negative for cough.    Cardiovascular: Positive for leg swelling. Negative for chest pain and palpitations.   Gastrointestinal: Negative for abdominal distention, abdominal pain, diarrhea, nausea " and vomiting.   Genitourinary: Negative for decreased urine volume and dysuria.   Skin: Negative for rash and wound.   Neurological: Negative for dizziness and headaches.     Objective:     Vital Signs (Most Recent):  Temp: 99 °F (37.2 °C) (04/11/18 0732)  Pulse: 103 (04/11/18 0754)  Resp: 20 (04/11/18 0754)  BP: (!) 110/53 (04/11/18 0732)  SpO2: (!) 90 % (04/11/18 0754) Vital Signs (24h Range):  Temp:  [97.7 °F (36.5 °C)-100 °F (37.8 °C)] 99 °F (37.2 °C)  Pulse:  [] 103  Resp:  [16-42] 20  SpO2:  [82 %-99 %] 90 %  BP: (106-129)/(53-61) 110/53     Weight: 68 kg (149 lb 14.6 oz)  Body mass index is 27.42 kg/m².  Body surface area is 1.72 meters squared.      Intake/Output Summary (Last 24 hours) at 04/11/18 0801  Last data filed at 04/11/18 0005   Gross per 24 hour   Intake             1190 ml   Output              250 ml   Net              940 ml       Physical Exam   Constitutional: She is oriented to person, place, and time. She appears well-developed and well-nourished. No distress.   HENT:   Head: Normocephalic and atraumatic.   Eyes: EOM are normal. Right eye exhibits no discharge. Left eye exhibits no discharge.   Neck: Neck supple.   Cardiovascular: Normal rate, regular rhythm, normal heart sounds and intact distal pulses.    No murmur heard.  Pulmonary/Chest: No respiratory distress. She has no wheezes. She has rales (bilatereal basal crackles. ).   On 10 L comfort flow.      Abdominal: Soft. Bowel sounds are normal. She exhibits no distension. There is no tenderness. There is no guarding.   Musculoskeletal: She exhibits edema (1+ in the lower extremities. Lt >Rt).   Neurological: She is alert and oriented to person, place, and time.   She is oriented to person, place and time. She has moments of confusion and memory problem.    Skin: Skin is warm and dry. She is not diaphoretic. There is pallor.   Psychiatric: She has a normal mood and affect.   Vitals reviewed.      Significant Labs:   CBC:   Recent  Labs  Lab 04/09/18  0925 04/10/18  0540 04/11/18  0534   WBC 12.33 11.42 15.88*   HGB 9.1* 7.3* 9.9*   HCT 30.0* 24.3* 32.0*    184 265    and CMP:   Recent Labs  Lab 04/09/18  0925 04/10/18  0540 04/11/18  0534    136 138   K 3.7 3.7 3.7   * 113* 111*   CO2 17* 18* 20*    102 132*   BUN 30* 24* 23   CREATININE 1.8* 1.7* 1.9*   CALCIUM 8.5* 7.0* 7.9*   PROT 5.9* 4.5* 5.5*   ALBUMIN 2.4* 1.8* 2.1*   BILITOT 0.5 0.4 0.8   ALKPHOS 93 73 106   AST 26 24 37   ALT 15 10 15   ANIONGAP 10 5* 7*   EGFRNONAA 27.5* 29.5* 25.8*       Diagnostic Results:  I have reviewed all pertinent imaging results/findings within the past 24 hours.    Assessment/Plan:     * Community acquired pneumonia    Patient presenting with worsening non productive cough and SOB. CXR with abnormal infiltration vs congestion. Her WBC is 12.33 K.  She is not on home oxygen. She was on 2-4 L of oxygen in the ED and her sats are in the lower 90s%.   - She received one dose of 2 g ceftriaxone IV  - 4/10 she is on 5 L nc of oxygen.  She spike a fever of 101.2 last night.   - 4/11: O2 requirements increased to 10 L comfort flow. CXR with worsening edema and small effusion. Her WBC count is up to 15 K this morning.     Plan:   - Switch moxifloxacin to Vancomycin and Cefepime.    - Non contrast CT chest.   - one dose of IV lasix 20 mg.   - monitor Is/Os.        Essential hypertension    - Her BP is on the lower side.     Plan:   - Hold amlodipine 5 mg.         Anticoagulant long-term use    She is on Xeralto for DVT.   - Her Cr clearance is 26.7    Plan:   - Switch to Eliquis 10 mg BID for 7 days then 5 mg BID. It was started on 4/11.         Diastolic dysfunction    Patient presented with failure to thrive. There was a concern of new onset Heart failure as she reports DELA CRUZ and orthopnea.   - 2D echo shows diastolic dysfunction and normal EF.   - CXR with worsening pulmonary congestion.   - one dose of IV lasix 20 mg is given on 4/11.            Seizure disorder    - Will continue her home dose of Keppra.         History of stroke    She had a stroke in October/17.   - Will continue Lipitor 40 daily.         History of breast cancer    - See HPI for more details.                  Emily Phillip MD  Hematology/Oncology  [facility]      ATTENDING NOTE, ONCOLOGY INPATIENT TEAM    As above; events of last 24 hours noted.  Patient seen and examined, chart reviewed.  Appears slightly uncomfortable, in mild respiratory distress.  Chest x ray reviewed; it shows worsening infiltrates bilaterally..  Lungs have scattered ronchi today; they were clear yesterday.  Abdomen is soft, nontender.  Labs reviewed.    PLAN  CT scan of chest without contrast today.  Follow CBC and electrolytes.  Continue antibiotics and breathing treatments.  Furosemide 40 mg IV x 1.  Dexamethasone 4 mg Q 12 hours.  We had a long discussion with her and her  about end of life and code status.  For now she wants to be fully rescucitated.  We will let ICU know about her and will ask them to take over if her respiratory status worsens.  Prognosis is guarded.  We will follow.       Og Pierce MD      2:45 p.m. ADDENDUM    CT scan reviewed with radiologist.  After administration of 40 mg of furosemide, her urine output was more than 1,400 ccs.  On examination she no longer has crackles.however, her O2 requirement remains high.  Dexamethasone started.    We will ask the Pulmonary Service to evaluate her.

## 2018-04-11 NOTE — ASSESSMENT & PLAN NOTE
She is on Xeralto for DVT.   - Her Cr clearance is 26.7    Plan:   - Switch to Eliquis 10 mg BID for 7 days then 5 mg BID. It was started on 4/11.

## 2018-04-11 NOTE — CARE UPDATE
RN Proactive Rounding Note  Time of Visit: 915    Admit Date: 2018  LOS: 1  Code Status: Full Code   Date of Visit: 2018  : 1945  Age: 72 y.o.  Sex: female  Bed: 826/82 A:   MRN: [id number]  Was the patient discharged from an ICU this admission? No  Was the patient discharged from a PACU within last 24 hours? no  Did the patient receive conscious sedation/general anesthesia in last 24 hours? no  Was the patient in the ED within the past 24 hours? No  Was the patient started on NIPPV within the past 24 hours? yes  Attending Physician: Og Pierce MD  Primary Service: [facility]      ASSESSMENT:     Abnormal Vital Signs: Decreased SpO2  Clinical Issues: Respiratory; follow-up for respiratory distress overnight. On assessment, patient is tachypnic RR-40/min SpO2-93-94% on HiFlo NC 10Lpm. Shallow, non-labored respirations, patient denies SOB. Diminished breath sounds to bilateral bases with mild crackles to left lower lobe.     INTERVENTIONS/ RECOMMENDATIONS:     Discussed plan of care with ROSA Martinez, who voiced concern for patient tolerating CT of chest. I agree that patient is high risk for respiratory failure while lying flat for CT. Will reassess patient's readiness for CT this afternoon.    PHYSICIAN ESCALATION:     No escalation at this time.    Disposition: Remain in room     FOLLOW-UP/CONTINGENCY:       Call back the Rapid Response Nurse at x [phone]  for additional questions or concerns

## 2018-04-11 NOTE — PLAN OF CARE
Problem: Patient Care Overview  Goal: Plan of Care Review  Outcome: Ongoing (interventions implemented as appropriate)  Pt on High- flow nasal cannula at 10L sating between 92-96%; dyspnea and desaturation noted with any exertion. MDs ordered for pt to be NPO due to concerns for aspiration. Oral swabs given for pt with complaints of dry mouth. CT of chest performed today, pt sent down on non-rebreather. Commode at bedside, pt able to use with 2 person assist. Respiratory treatments now scheduled every 4 hours. Bed in lowest position, nonskid footwear on pt when out of bed, call light and possessions within reach, side rails up x2. Pt voices understanding to call for assistance. Will continue to monitor.

## 2018-04-11 NOTE — PLAN OF CARE
Problem: Patient Care Overview  Goal: Plan of Care Review  Outcome: Ongoing (interventions implemented as appropriate)  At beginning of shift, pt was sitting up in bed with  at bedside eating dinner. When I came back a little later, I found pt to be tachypneic and tachycardic, with sats in the 70s. Pt converted from nasal cannula to venti mask to non rebreather. ABGs and chest xray performed. Pt eventually converted to a high flow nasal cannula. Oxygen titrated to keep sats greater than 92%. Pt started on cefepime and vancomycin. Pt becomes confused at times and tries to get out of bed. Bed alarm activated. 2 person assistance required to get pt to BSC.

## 2018-04-11 NOTE — TELEPHONE ENCOUNTER
Spoke with son.   He is calling to inform dr sullivan his mom is admitted presently.   Nurse informed son dr sullivan was aware and is up to date on her current condition.  Son thanked nurse.

## 2018-04-11 NOTE — ASSESSMENT & PLAN NOTE
Patient presenting with worsening non productive cough and SOB. CXR with abnormal infiltration vs congestion. Her WBC is 12.33 K.  She is not on home oxygen. She was on 2-4 L of oxygen in the ED and her sats are in the lower 90s%.   - She received one dose of 2 g ceftriaxone IV  - 4/10 she is on 5 L nc of oxygen.  She spike a fever of 101.2 last night.   - 4/11: O2 requirements increased to 10 L comfort flow. CXR with worsening edema and small effusion.     Plan:   - Switch moxifloxacin to Vancomycin and Cefepime.    - Non contrast CT chest.   - one dose of IV lasix 20 mg.   - monitor Is/Os.

## 2018-04-11 NOTE — ASSESSMENT & PLAN NOTE
Patient presented with failure to thrive. There was a concern of new onset Heart failure as she reports DELA CRUZ and orthopnea.   - 2D echo shows diastolic dysfunction and normal EF.   - CXR with worsening pulmonary congestion.   - one dose of IV lasix 20 mg is given on 4/11.

## 2018-04-11 NOTE — SUBJECTIVE & OBJECTIVE
Interval History: patient had increased O2 requirements overnight. She is more SOB. She denies chest pain.     Oncology Treatment Plan:   [No treatment plan]    Medications:  Continuous Infusions:  Scheduled Meds:   amitriptyline  50 mg Oral QHS    apixaban  10 mg Oral BID    atorvastatin  40 mg Oral Daily    [START ON 4/12/2018] ceFEPime (MAXIPIME) IVPB  2 g Intravenous Daily    levETIRAcetam  750 mg Oral BID    lipase-protease-amylase 12,000-38,000-60,000 units  1 capsule Oral TID WM    sodium bicarbonate  650 mg Oral TID     PRN Meds:sodium chloride, acetaminophen, albuterol-ipratropium 2.5mg-0.5mg/3mL, dextrose 50%, dextrose 50%, glucagon (human recombinant), glucose, glucose, LORazepam, ondansetron, promethazine (PHENERGAN) IVPB, sodium chloride 0.9%     Review of Systems   Constitutional: Positive for activity change. Negative for appetite change, fatigue and fever.   HENT: Negative for congestion and sinus pain.    Respiratory: Positive for shortness of breath. Negative for cough.    Cardiovascular: Positive for leg swelling. Negative for chest pain and palpitations.   Gastrointestinal: Negative for abdominal distention, abdominal pain, diarrhea, nausea and vomiting.   Genitourinary: Negative for decreased urine volume and dysuria.   Skin: Negative for rash and wound.   Neurological: Negative for dizziness and headaches.     Objective:     Vital Signs (Most Recent):  Temp: 99 °F (37.2 °C) (04/11/18 0732)  Pulse: 103 (04/11/18 0754)  Resp: 20 (04/11/18 0754)  BP: (!) 110/53 (04/11/18 0732)  SpO2: (!) 90 % (04/11/18 0754) Vital Signs (24h Range):  Temp:  [97.7 °F (36.5 °C)-100 °F (37.8 °C)] 99 °F (37.2 °C)  Pulse:  [] 103  Resp:  [16-42] 20  SpO2:  [82 %-99 %] 90 %  BP: (106-129)/(53-61) 110/53     Weight: 68 kg (149 lb 14.6 oz)  Body mass index is 27.42 kg/m².  Body surface area is 1.72 meters squared.      Intake/Output Summary (Last 24 hours) at 04/11/18 0801  Last data filed at 04/11/18 0005    Gross per 24 hour   Intake             1190 ml   Output              250 ml   Net              940 ml       Physical Exam   Constitutional: She is oriented to person, place, and time. She appears well-developed and well-nourished. No distress.   HENT:   Head: Normocephalic and atraumatic.   Eyes: EOM are normal. Right eye exhibits no discharge. Left eye exhibits no discharge.   Neck: Neck supple.   Cardiovascular: Normal rate, regular rhythm, normal heart sounds and intact distal pulses.    No murmur heard.  Pulmonary/Chest: No respiratory distress. She has no wheezes. She has rales (bilatereal basal crackles. ).   On 10 L comfort flow.      Abdominal: Soft. Bowel sounds are normal. She exhibits no distension. There is no tenderness. There is no guarding.   Musculoskeletal: She exhibits edema (1+ in the lower extremities. Lt >Rt).   Neurological: She is alert and oriented to person, place, and time.   She is oriented to person, place and time. She has moments of confusion and memory problem.    Skin: Skin is warm and dry. She is not diaphoretic. There is pallor.   Psychiatric: She has a normal mood and affect.   Vitals reviewed.      Significant Labs:   CBC:   Recent Labs  Lab 04/09/18  0925 04/10/18  0540 04/11/18  0534   WBC 12.33 11.42 15.88*   HGB 9.1* 7.3* 9.9*   HCT 30.0* 24.3* 32.0*    184 265    and CMP:   Recent Labs  Lab 04/09/18  0925 04/10/18  0540 04/11/18  0534    136 138   K 3.7 3.7 3.7   * 113* 111*   CO2 17* 18* 20*    102 132*   BUN 30* 24* 23   CREATININE 1.8* 1.7* 1.9*   CALCIUM 8.5* 7.0* 7.9*   PROT 5.9* 4.5* 5.5*   ALBUMIN 2.4* 1.8* 2.1*   BILITOT 0.5 0.4 0.8   ALKPHOS 93 73 106   AST 26 24 37   ALT 15 10 15   ANIONGAP 10 5* 7*   EGFRNONAA 27.5* 29.5* 25.8*       Diagnostic Results:  I have reviewed all pertinent imaging results/findings within the past 24 hours.

## 2018-04-12 ENCOUNTER — TELEPHONE (OUTPATIENT)
Dept: GASTROENTEROLOGY | Facility: CLINIC | Age: 73
End: 2018-04-12

## 2018-04-12 PROBLEM — J96.01 ACUTE HYPOXEMIC RESPIRATORY FAILURE: Status: ACTIVE | Noted: 2018-04-12

## 2018-04-12 PROBLEM — N18.30 STAGE 3 CHRONIC KIDNEY DISEASE: Status: ACTIVE | Noted: 2018-04-12

## 2018-04-12 LAB
ALBUMIN SERPL BCP-MCNC: 2.1 G/DL
ALP SERPL-CCNC: 106 U/L
ALT SERPL W/O P-5'-P-CCNC: 16 U/L
ANION GAP SERPL CALC-SCNC: 11 MMOL/L
AST SERPL-CCNC: 38 U/L
BACTERIA UR CULT: NORMAL
BASOPHILS # BLD AUTO: 0.01 K/UL
BASOPHILS NFR BLD: 0.1 %
BILIRUB SERPL-MCNC: 0.5 MG/DL
BNP SERPL-MCNC: 42 PG/ML
BUN SERPL-MCNC: 31 MG/DL
CALCIUM SERPL-MCNC: 7.8 MG/DL
CHLORIDE SERPL-SCNC: 110 MMOL/L
CO2 SERPL-SCNC: 17 MMOL/L
CREAT SERPL-MCNC: 2.1 MG/DL
DIFFERENTIAL METHOD: ABNORMAL
EOSINOPHIL # BLD AUTO: 0 K/UL
EOSINOPHIL NFR BLD: 0 %
ERYTHROCYTE [DISTWIDTH] IN BLOOD BY AUTOMATED COUNT: 19.2 %
EST. GFR  (AFRICAN AMERICAN): 26.3 ML/MIN/1.73 M^2
EST. GFR  (NON AFRICAN AMERICAN): 22.8 ML/MIN/1.73 M^2
GLUCOSE SERPL-MCNC: 167 MG/DL
HCT VFR BLD AUTO: 30.9 %
HGB BLD-MCNC: 9.5 G/DL
IMM GRANULOCYTES # BLD AUTO: 0.12 K/UL
IMM GRANULOCYTES NFR BLD AUTO: 0.9 %
LYMPHOCYTES # BLD AUTO: 0.7 K/UL
LYMPHOCYTES NFR BLD: 5.2 %
MAGNESIUM SERPL-MCNC: 2.2 MG/DL
MCH RBC QN AUTO: 25.6 PG
MCHC RBC AUTO-ENTMCNC: 30.7 G/DL
MCV RBC AUTO: 83 FL
MONOCYTES # BLD AUTO: 0.6 K/UL
MONOCYTES NFR BLD: 4.2 %
NEUTROPHILS # BLD AUTO: 12.5 K/UL
NEUTROPHILS NFR BLD: 89.6 %
NRBC BLD-RTO: 0 /100 WBC
PHOSPHATE SERPL-MCNC: 3.4 MG/DL
PLATELET # BLD AUTO: 210 K/UL
PMV BLD AUTO: 11.8 FL
POTASSIUM SERPL-SCNC: 3.6 MMOL/L
PROT SERPL-MCNC: 5.8 G/DL
RBC # BLD AUTO: 3.71 M/UL
SODIUM SERPL-SCNC: 138 MMOL/L
WBC # BLD AUTO: 13.91 K/UL

## 2018-04-12 PROCEDURE — 25000242 PHARM REV CODE 250 ALT 637 W/ HCPCS: Performed by: STUDENT IN AN ORGANIZED HEALTH CARE EDUCATION/TRAINING PROGRAM

## 2018-04-12 PROCEDURE — 83735 ASSAY OF MAGNESIUM: CPT

## 2018-04-12 PROCEDURE — 92610 EVALUATE SWALLOWING FUNCTION: CPT

## 2018-04-12 PROCEDURE — 25000003 PHARM REV CODE 250: Performed by: STUDENT IN AN ORGANIZED HEALTH CARE EDUCATION/TRAINING PROGRAM

## 2018-04-12 PROCEDURE — 94761 N-INVAS EAR/PLS OXIMETRY MLT: CPT

## 2018-04-12 PROCEDURE — 84100 ASSAY OF PHOSPHORUS: CPT

## 2018-04-12 PROCEDURE — 83880 ASSAY OF NATRIURETIC PEPTIDE: CPT

## 2018-04-12 PROCEDURE — 85025 COMPLETE CBC W/AUTO DIFF WBC: CPT

## 2018-04-12 PROCEDURE — 94640 AIRWAY INHALATION TREATMENT: CPT

## 2018-04-12 PROCEDURE — 99223 1ST HOSP IP/OBS HIGH 75: CPT | Mod: ,,, | Performed by: INTERNAL MEDICINE

## 2018-04-12 PROCEDURE — 63600175 PHARM REV CODE 636 W HCPCS: Performed by: INTERNAL MEDICINE

## 2018-04-12 PROCEDURE — 20600001 HC STEP DOWN PRIVATE ROOM

## 2018-04-12 PROCEDURE — 27100171 HC OXYGEN HIGH FLOW UP TO 24 HOURS

## 2018-04-12 PROCEDURE — 63600175 PHARM REV CODE 636 W HCPCS: Performed by: STUDENT IN AN ORGANIZED HEALTH CARE EDUCATION/TRAINING PROGRAM

## 2018-04-12 PROCEDURE — 99233 SBSQ HOSP IP/OBS HIGH 50: CPT | Mod: ,,, | Performed by: INTERNAL MEDICINE

## 2018-04-12 PROCEDURE — A4216 STERILE WATER/SALINE, 10 ML: HCPCS | Performed by: STUDENT IN AN ORGANIZED HEALTH CARE EDUCATION/TRAINING PROGRAM

## 2018-04-12 PROCEDURE — 94799 UNLISTED PULMONARY SVC/PX: CPT

## 2018-04-12 PROCEDURE — 80053 COMPREHEN METABOLIC PANEL: CPT

## 2018-04-12 PROCEDURE — 36415 COLL VENOUS BLD VENIPUNCTURE: CPT

## 2018-04-12 RX ORDER — POLYETHYLENE GLYCOL 3350 17 G/17G
17 POWDER, FOR SOLUTION ORAL DAILY
Status: DISCONTINUED | OUTPATIENT
Start: 2018-04-12 | End: 2018-04-16 | Stop reason: HOSPADM

## 2018-04-12 RX ADMIN — APIXABAN 10 MG: 2.5 TABLET, FILM COATED ORAL at 09:04

## 2018-04-12 RX ADMIN — IPRATROPIUM BROMIDE AND ALBUTEROL SULFATE 3 ML: .5; 3 SOLUTION RESPIRATORY (INHALATION) at 11:04

## 2018-04-12 RX ADMIN — IPRATROPIUM BROMIDE AND ALBUTEROL SULFATE 3 ML: .5; 3 SOLUTION RESPIRATORY (INHALATION) at 07:04

## 2018-04-12 RX ADMIN — DEXAMETHASONE SODIUM PHOSPHATE 4 MG: 4 INJECTION, SOLUTION INTRAMUSCULAR; INTRAVENOUS at 09:04

## 2018-04-12 RX ADMIN — SODIUM CHLORIDE, PRESERVATIVE FREE 5 ML: 5 INJECTION INTRAVENOUS at 05:04

## 2018-04-12 RX ADMIN — IPRATROPIUM BROMIDE AND ALBUTEROL SULFATE 3 ML: .5; 3 SOLUTION RESPIRATORY (INHALATION) at 08:04

## 2018-04-12 RX ADMIN — GUAIFENESIN 600 MG: 600 TABLET, EXTENDED RELEASE ORAL at 08:04

## 2018-04-12 RX ADMIN — CEFEPIME 1 G: 1 INJECTION, POWDER, FOR SOLUTION INTRAMUSCULAR; INTRAVENOUS at 08:04

## 2018-04-12 RX ADMIN — IPRATROPIUM BROMIDE AND ALBUTEROL SULFATE 3 ML: .5; 3 SOLUTION RESPIRATORY (INHALATION) at 04:04

## 2018-04-12 RX ADMIN — LEVETIRACETAM 750 MG: 750 TABLET ORAL at 08:04

## 2018-04-12 RX ADMIN — AMITRIPTYLINE HYDROCHLORIDE 50 MG: 25 TABLET, FILM COATED ORAL at 09:04

## 2018-04-12 RX ADMIN — DEXAMETHASONE SODIUM PHOSPHATE 4 MG: 4 INJECTION, SOLUTION INTRAMUSCULAR; INTRAVENOUS at 08:04

## 2018-04-12 RX ADMIN — PANCRELIPASE 1 CAPSULE: 60000; 12000; 38000 CAPSULE, DELAYED RELEASE PELLETS ORAL at 11:04

## 2018-04-12 RX ADMIN — APIXABAN 10 MG: 2.5 TABLET, FILM COATED ORAL at 08:04

## 2018-04-12 RX ADMIN — IPRATROPIUM BROMIDE AND ALBUTEROL SULFATE 3 ML: .5; 3 SOLUTION RESPIRATORY (INHALATION) at 12:04

## 2018-04-12 RX ADMIN — LORAZEPAM 1 MG: 1 TABLET ORAL at 09:04

## 2018-04-12 RX ADMIN — POLYETHYLENE GLYCOL 3350 17 G: 17 POWDER, FOR SOLUTION ORAL at 03:04

## 2018-04-12 RX ADMIN — LEVETIRACETAM 750 MG: 750 TABLET ORAL at 09:04

## 2018-04-12 RX ADMIN — PANCRELIPASE 1 CAPSULE: 60000; 12000; 38000 CAPSULE, DELAYED RELEASE PELLETS ORAL at 08:04

## 2018-04-12 RX ADMIN — SODIUM BICARBONATE 650 MG TABLET 650 MG: at 03:04

## 2018-04-12 RX ADMIN — SODIUM BICARBONATE 650 MG TABLET 650 MG: at 09:04

## 2018-04-12 RX ADMIN — SODIUM BICARBONATE 650 MG TABLET 650 MG: at 08:04

## 2018-04-12 RX ADMIN — GUAIFENESIN 600 MG: 600 TABLET, EXTENDED RELEASE ORAL at 09:04

## 2018-04-12 RX ADMIN — ATORVASTATIN CALCIUM 40 MG: 20 TABLET, FILM COATED ORAL at 08:04

## 2018-04-12 RX ADMIN — PANCRELIPASE 1 CAPSULE: 60000; 12000; 38000 CAPSULE, DELAYED RELEASE PELLETS ORAL at 05:04

## 2018-04-12 NOTE — TELEPHONE ENCOUNTER
Spoke with patient's , she is currently hospitalized.    Results given per Dr. Carmona. Birgit St verbalized understanding.

## 2018-04-12 NOTE — PLAN OF CARE
Problem: Patient Care Overview  Goal: Plan of Care Review  Outcome: Ongoing (interventions implemented as appropriate)  Pt looks much better this evening. Her clor is better, and she is more awake and alert. Oxygen sats in the mid 90s with HR in the 90s on high flow nasal cannula at 10 liters. Pt got up much easier with 2 person assistance to the bedside commode to urinate. Denies any pain or distress. Son at bedside. Bed alarm in use. Afebrile. No skin breakdown.

## 2018-04-12 NOTE — HPI
72 year old female with DVT on Xarelto (recently switched to eloquis in lieu of renal function), Breast CA (1994), HTN, pacreatic CA (1998), meningioma s/p resection,  seizure disorder on Keppra, lung cancer in Feb 2016 with mets to brain and R hip (progressed on Tarceva), stroke in 10/17 with no residual deficit, and depression who initially presented to the ED with 2 days of fatigue and subjective fevers. She was more short of breath and had dry cough as well as reported some leg swelling and nausea on admission.   Patient was initially thought to have a pneumonia and treated with ceftriaxone. She was on 4-5L NC initially until 04/10 when her oxygen requirements acutely increased to 10L and repeat chest x-ray showed worsening pulmonary edema. On 04/10 prior to the increased oxygen requirements, patient received 1unit pRBCs for Hgb of 7.3. She also spiked a fever on 04/10 and is currently on cefepime and vanc as well as dexamethasone. Lasix were begun and she is thus far net negative 1.1L.    Pulmonology was consulted for aide in assessing patient's hypoxia.

## 2018-04-12 NOTE — CONSULTS
[facility]  Pulmonology  Consult Note    Patient Name: Naty St  MRN: [id number]  Admission Date: 4/9/2018  Hospital Length of Stay: 2 days  Code Status: DNR  Attending Physician: Og Pierce MD  Primary Care Provider: Olvin Mars MD   Principal Problem: Community acquired pneumonia    Consults  Subjective:     HPI:  72 year old female with DVT on Xarelto (recently switched to eloquis in lieu of renal function), Breast CA (1994), HTN, pacreatic CA (1998), meningioma s/p resection,  seizure disorder on Keppra, lung cancer in Feb 2016 with mets to brain and R hip (progressed on Tarceva), stroke in 10/17 with no residual deficit, and depression who initially presented to the ED with 2 days of fatigue and subjective fevers. She was more short of breath and had dry cough as well as reported some leg swelling and nausea on admission.   Patient was initially thought to have a pneumonia and treated with ceftriaxone. She was on 4-5L NC initially until 04/10 when her oxygen requirements acutely increased to 10L and repeat chest x-ray showed worsening pulmonary edema. On 04/10 prior to the increased oxygen requirements, patient received 1unit pRBCs for Hgb of 7.3. She also spiked a fever on 04/10 and is currently on cefepime and vanc as well as dexamethasone. Lasix were begun and she is thus far net negative 1.1L.    Pulmonology was consulted for aide in assessing patient's hypoxia.         Past Medical History:   Diagnosis Date    Anticoagulant long-term use     Blood clot in vein 06/2016    Breast cancer 1994    Cataract     Hypertension     Pancreatic cancer 1998    Posterior capsular opacification, left eye     Psychiatric problem     Seizures 01/25/2016    Stroke     Therapy        Past Surgical History:   Procedure Laterality Date    BONE BIOSPY  3/9/16    BRAIN SURGERY  1/12/16    tumor removal 1/12/16    BREAST LUMPECTOMY  1998    left    CATARACT EXTRACTION  "Bilateral 2004    yag OU    CHOLECYSTECTOMY  1998    COLONOSCOPY N/A 11/13/2015    Procedure: COLONOSCOPY;  Surgeon: Alex Carmona MD;  Location: [facility] (4TH FLR);  Service: Endoscopy;  Laterality: N/A;  2 year f/u    cysto and right ureteral stent  4/27/12    ecoli  2010    removal of blockage, done throat, then through the liver.    HYSTERECTOMY  1974    KIDNEY STONE SURGERY  2010--laser    left eswl  6/20/12    OOPHORECTOMY  4/25/2012    Laparoscopic BSO, lysis of adhesions, cystoscopy greater than 35mins     WHIPPLE PROCEDURE W/ LAPAROSCOPY  1998       Review of patient's allergies indicates:   Allergen Reactions    Tobradex [tobramycin-dexamethasone] Swelling    Iodinated contrast- oral and iv dye Hives    Morphine Other (See Comments)     "shaking" and tremors    Latex Rash    Phenytoin sodium extended Other (See Comments) and Rash       Family History     Problem Relation (Age of Onset)    Breast cancer Mother    Leukemia Paternal Grandfather    Lung cancer Mother    Stomach cancer Paternal Grandmother        Social History Main Topics    Smoking status: Former Smoker     Packs/day: 0.00     Years: 0.00     Quit date: 9/26/1961    Smokeless tobacco: Never Used    Alcohol use No    Drug use: No    Sexual activity: Yes     Partners: Male         Review of Systems   Constitutional: Positive for activity change. Negative for chills, fatigue, fever and unexpected weight change.   HENT: Negative for congestion and sneezing.    Respiratory: Positive for cough. Negative for choking, chest tightness, shortness of breath and wheezing.    Cardiovascular: Negative for chest pain, palpitations and leg swelling.   Gastrointestinal: Negative for abdominal pain, diarrhea, nausea and vomiting.   Genitourinary: Negative for difficulty urinating and urgency.   Musculoskeletal: Negative for arthralgias and myalgias.   Skin: Negative for color change, pallor and rash.   Neurological: Positive for " weakness. Negative for dizziness, tremors, numbness and headaches.   Psychiatric/Behavioral: Negative for agitation and confusion.     Objective:     Vital Signs (Most Recent):  Temp: 98.1 °F (36.7 °C) (04/12/18 1119)  Pulse: 108 (04/12/18 1155)  Resp: 16 (04/12/18 1119)  BP: (!) 115/56 (04/12/18 1119)  SpO2: 100 % (04/12/18 1119) Vital Signs (24h Range):  Temp:  [96.3 °F (35.7 °C)-98.1 °F (36.7 °C)] 98.1 °F (36.7 °C)  Pulse:  [] 108  Resp:  [16-32] 16  SpO2:  [90 %-100 %] 100 %  BP: ()/(53-59) 115/56     Weight: 68 kg (149 lb 14.6 oz)  Body mass index is 27.42 kg/m².      Intake/Output Summary (Last 24 hours) at 04/12/18 1441  Last data filed at 04/12/18 1100   Gross per 24 hour   Intake              570 ml   Output             1300 ml   Net             -730 ml       Physical Exam   Constitutional: She is oriented to person, place, and time. She appears well-developed and well-nourished. No distress.   Elderly appearing lady in NAD    HENT:   Head: Normocephalic and atraumatic.   Eyes: EOM are normal. Pupils are equal, round, and reactive to light.   Neck: Normal range of motion. Neck supple. No JVD present. No thyromegaly present.   Cardiovascular: Regular rhythm.  Exam reveals gallop.    tachycardiac   Pulmonary/Chest: Effort normal. No stridor. No respiratory distress. She has no rales. She exhibits no tenderness.   On 8L comfort flow   Abdominal: Soft. Bowel sounds are normal. She exhibits no distension. There is no tenderness.   Musculoskeletal: Normal range of motion. She exhibits no edema.   Neurological: She is alert and oriented to person, place, and time.   Skin: Skin is warm and dry. Capillary refill takes less than 2 seconds. She is not diaphoretic. No erythema. There is pallor.   Psychiatric: She has a normal mood and affect.       Vents:  Oxygen Concentration (%): 100 (04/10/18 2133)    Lines/Drains/Airways     Peripheral Intravenous Line                 Peripheral IV - Single Lumen Right  Wrist -- days                Significant Labs:    CBC/Anemia Profile:    Recent Labs  Lab 04/11/18  0534 04/12/18  0501   WBC 15.88* 13.91*   HGB 9.9* 9.5*   HCT 32.0* 30.9*    210   MCV 84 83   RDW 18.6* 19.2*        Chemistries:    Recent Labs  Lab 04/11/18  0534 04/12/18  0501    138   K 3.7 3.6   * 110   CO2 20* 17*   BUN 23 31*   CREATININE 1.9* 2.1*   CALCIUM 7.9* 7.8*   ALBUMIN 2.1* 2.1*   PROT 5.5* 5.8*   BILITOT 0.8 0.5   ALKPHOS 106 106   ALT 15 16   AST 37 38   MG 1.9 2.2   PHOS 2.4* 3.4     All pertinent labs within the past 24 hours have been reviewed.    Significant Imaging:   I have reviewed all pertinent imaging results/findings within the past 24 hours.    Assessment/Plan:     Acute hypoxemic respiratory failure    Patient initially admitted with concern for pneumonia and treated with ceftriaxone; however, later escalated to cefepime and vanc given fever spike and leukocytosis  - not on home O2 but needed 4L NC upon arrival initially; however, had acute increase in oxygen requirements on 04/10 s/p 1 unit pRBCs for Hgb: 7.3  - chest x-ray showing worsening pulmonary edema  - acute increase in oxygen needs likely secondary to viral infection with superimposed transfusion related lung injury in patient having never previously received blood products and increased requirements occurring after transfusion  - would recommend continued diuresis as tolerated (watch increase in Cr as s/p 60mg IV lasix there is already an elevation of Cr)   - recommend supportive care because if TRALI is the etiology, it will take time to resolve fully   - wean oxygen as tolerated - today at 6-8l; can use CPAP qhs PRN   - can continue scheduled steroids - could consider solumedrol vs dexamethasone although overall benefit of steroids in TRALI is controversial   - suggest obtaining full respiratory viral panel as only flu was obtained and negative                   Thank you for your consult. I will  follow-up with patient. Please contact us if you have any additional questions.     Kesha Corrales MD  Pulmonology  [facility]

## 2018-04-12 NOTE — CONSULTS
[facility]  Pulmonology  Consult Note    Patient Name: Naty St  MRN: [id number]  Admission Date: 4/9/2018  Hospital Length of Stay: 2 days  Code Status: DNR  Attending Physician: Og Pierce MD  Primary Care Provider: Olvin Mars MD   Principal Problem: Community acquired pneumonia    Inpatient consult to Pulmonology  Consult performed by: MONIQUE CROWLEY  Consult ordered by: MUNDO HUNTER        Subjective:     HPI:  72 year old female with DVT on Xarelto (recently switched to eloquis in lieu of renal function), Breast CA (1994), HTN, pacreatic CA (1998), meningioma s/p resection,  seizure disorder on Keppra, lung cancer in Feb 2016 with mets to brain and R hip (progressed on Tarceva), stroke in 10/17 with no residual deficit, and depression who initially presented to the ED with 2 days of fatigue and subjective fevers. She was more short of breath and had dry cough as well as reported some leg swelling and nausea on admission.   Patient was initially thought to have a pneumonia and treated with ceftriaxone. She was on 4-5L NC initially until 04/10 when her oxygen requirements acutely increased to 10L and repeat chest x-ray showed worsening pulmonary edema. On 04/10 prior to the increased oxygen requirements, patient received 1unit pRBCs for Hgb of 7.3. She also spiked a fever on 04/10 and is currently on cefepime and vanc as well as dexamethasone. Lasix were begun and she is thus far net negative 1.1L.    Pulmonology was consulted for aide in assessing patient's hypoxia.         No new subjective & objective note has been filed under this hospital service since the last note was generated.    Assessment/Plan:     Acute hypoxemic respiratory failure    Patient initially admitted with concern for pneumonia and treated with ceftriaxone; however, later escalated to cefepime and vanc given fever spike and leukocytosis  - not on home O2 but needed 4L NC upon arrival initially;  however, had acute increase in oxygen requirements on 04/10 s/p 1 unit pRBCs for Hgb: 7.3  - chest x-ray showing worsening pulmonary edema  - acute increase in oxygen needs likely secondary to viral infection with superimposed transfusion related lung injury in patient having never previously received blood products and increased requirements occurring after transfusion  - would recommend continued diuresis as tolerated (watch increase in Cr as s/p 60mg IV lasix there is already an elevation of Cr)   - recommend supportive care because if TRALI is the etiology, it will take time to resolve fully   - wean oxygen as tolerated - today at 6-8l; can use CPAP qhs PRN   - can continue scheduled steroids - could consider solumedrol vs dexamethasone although overall benefit of steroids in TRALI is controversial   - suggest obtaining full respiratory viral panel as only flu was obtained and negative                   Thank you for your consult. I will follow-up with patient. Please contact us if you have any additional questions.     Kesha Corrales MD  Pulmonology  [facility]

## 2018-04-12 NOTE — PT/OT/SLP EVAL
Speech Language Pathology Evaluation  Bedside Swallow    Patient Name:  Naty St   MRN:  [id number]  Admitting Diagnosis: Community acquired pneumonia    Recommendations:                 General Recommendations:  Modified barium swallow study and/or follow up to check diet tolerance  Diet recommendations:  Regular, Thin   Aspiration Precautions: 1 bite/sip at a time, Feed only when awake/alert, Monitor for s/s of aspiration, No straws, Remain upright 30 minutes post meal, Small bites/sips and Strict aspiration precautions   Team paged, awaiting call back  General Precautions: Standard, aspiration, fall  Communication strategies:  none    History:     Past Medical History:   Diagnosis Date    Anticoagulant long-term use     Blood clot in vein 06/2016    Breast cancer 1994    Cataract     Hypertension     Pancreatic cancer 1998    Posterior capsular opacification, left eye     Psychiatric problem     Seizures 01/25/2016    Stroke     Therapy        Past Surgical History:   Procedure Laterality Date    BONE BIOSPY  3/9/16    BRAIN SURGERY  1/12/16    tumor removal 1/12/16    BREAST LUMPECTOMY  1998    left    CATARACT EXTRACTION Bilateral 2004    yag OU    CHOLECYSTECTOMY  1998    COLONOSCOPY N/A 11/13/2015    Procedure: COLONOSCOPY;  Surgeon: Alex Carmona MD;  Location: [facility] ([address] [place] [address]);  Service: Endoscopy;  Laterality: N/A;  2 year f/u    cysto and right ureteral stent  4/27/12    ecoli  2010    removal of blockage, done throat, then through the liver.    HYSTERECTOMY  1974    KIDNEY STONE SURGERY  2010--laser    left eswl  6/20/12    OOPHORECTOMY  4/25/2012    Laparoscopic BSO, lysis of adhesions, cystoscopy greater than 35mins     WHIPPLE PROCEDURE W/ LAPAROSCOPY  1998       Chest X-Rays: 4/10 Cardiomegaly with increasing bilateral airspace infiltrates/edema.    Blunting of the CP angle suggests trace effusions.    Prior diet: regular/thin - pt & spouse report that pt has  brian coughing on liquids at home but has not been coughing since she has been in the hospital    Subjective   Awake & alert, weak.  at bedside.     Pain/Comfort:  · Pain Rating 1: 0/10  · Pain Rating Post-Intervention 2: 0/10    Objective:     Oral Musculature Evaluation  · Oral Musculature: general weakness  · Dentition: upper dentures (some bottom tetth)  · Mucosal Quality: adequate  · Oral Labial Strength and Mobility: WFL  · Lingual Strength and Mobility: WFL  · Volitional Cough: weak  · Volitional Swallow: decreased hyolaryngeal excursion via digital palpation  · Voice Prior to PO Intake: weak    Bedside Swallow Eval:   Consistencies Assessed:  · Thin liquids via cup sips x3 & via straw sips x4  · Solids 1/2 myra cracker     Oral Phase:   · WFL    Pharyngeal Phase:   · decreased hyolaryngeal excursion to palpation  · no overt clinical signs/symptoms of aspiration      Assessment:     Naty St is a 72 y.o. female with an SLP diagnosis of Dysphagia.  Although no overt s/s aspiration during this assessment, pt remains at risk of silent aspiration 2/2 s/s pharyngeal dysphagia. SLP recs that Modified Barium Swallow Study be considered to objectively assess swallow & rule out aspiration.     Goals:    SLP Goals        Problem: SLP Goal    Goal Priority Disciplines Outcome   SLP Goal     SLP    Description:  Speech Language Pathology Goals  Goals expected to be met by 4/19  1. Modified Barium Swallow Study to rule out aspiration   2. Pt will tolerate regular & thin liquids with no s/s aspiration                    Plan:     · Patient to be seen:  4 x/week   · Plan of Care expires:  05/11/18  · Plan of Care reviewed with:  patient, spouse   · SLP Follow-Up:  Yes       Discharge recommendations:  nursing facility, skilled     Time Tracking:     SLP Treatment Date:   04/12/18  Speech Start Time:  1433  Speech Stop Time:  1448     Speech Total Time (min):  15 min    Billable Minutes: Eval Swallow and  Oral Function 15    Esther Ngo, CCC-SLP  04/12/2018   [phone]

## 2018-04-12 NOTE — SUBJECTIVE & OBJECTIVE
Interval History:   Patient seen and examined this AM. Patient had continued high oxygen demand yesterday. Lasix 40mg IV given yesterday with good UOP, 1.1L negative in past 24h. Patient reports subjective improvement but still remains with SOB. No new chest pain, abdo pain, n/v, diarrhea.     Oncology Treatment Plan:   [No treatment plan]    Medications:  Continuous Infusions:  Scheduled Meds:   albuterol-ipratropium 2.5mg-0.5mg/3mL  3 mL Nebulization Q4H    amitriptyline  50 mg Oral QHS    apixaban  10 mg Oral BID    atorvastatin  40 mg Oral Daily    ceFEPime (MAXIPIME) IVPB  1 g Intravenous Daily    dexamethasone  4 mg Intravenous Q12H    guaiFENesin  600 mg Oral BID    levETIRAcetam  750 mg Oral BID    lipase-protease-amylase 12,000-38,000-60,000 units  1 capsule Oral TID WM    sodium bicarbonate  650 mg Oral TID     PRN Meds:sodium chloride, acetaminophen, dextrose 50%, dextrose 50%, glucagon (human recombinant), glucose, glucose, LORazepam, ondansetron, promethazine (PHENERGAN) IVPB, sodium chloride, sodium chloride 0.9%     Review of Systems   Constitutional: Positive for activity change. Negative for appetite change, fatigue and fever.   HENT: Negative for congestion and sinus pain.    Respiratory: Positive for shortness of breath. Negative for cough.    Cardiovascular: Positive for leg swelling. Negative for chest pain and palpitations.   Gastrointestinal: Negative for abdominal distention, abdominal pain, diarrhea, nausea and vomiting.   Genitourinary: Negative for decreased urine volume and dysuria.   Skin: Negative for rash and wound.   Neurological: Negative for dizziness and headaches.     Objective:     Vital Signs (Most Recent):  Temp: 97.4 °F (36.3 °C) (04/12/18 0517)  Pulse: 101 (04/12/18 0517)  Resp: 20 (04/12/18 0517)  BP: (!) 107/59 (04/12/18 0517)  SpO2: (!) 90 % (04/12/18 0517) Vital Signs (24h Range):  Temp:  [96.3 °F (35.7 °C)-99 °F (37.2 °C)] 97.4 °F (36.3 °C)  Pulse:  []  101  Resp:  [16-36] 20  SpO2:  [85 %-97 %] 90 %  BP: ()/(53-59) 107/59     Weight: 68 kg (149 lb 14.6 oz)  Body mass index is 27.42 kg/m².  Body surface area is 1.72 meters squared.      Intake/Output Summary (Last 24 hours) at 04/12/18 0718  Last data filed at 04/11/18 1800   Gross per 24 hour   Intake              270 ml   Output             1400 ml   Net            -1130 ml       Physical Exam   Constitutional: She is oriented to person, place, and time. She appears well-developed and well-nourished. No distress.   HENT:   Head: Normocephalic and atraumatic.   Eyes: EOM are normal. Right eye exhibits no discharge. Left eye exhibits no discharge.   Neck: Neck supple.   Cardiovascular: Normal rate, regular rhythm, normal heart sounds and intact distal pulses.    No murmur heard.  Pulmonary/Chest: No respiratory distress. She has no wheezes.   - On 10 L comfort flow.   - good air movement  - no wheeze  - crackles improved with diuresis      Abdominal: Soft. Bowel sounds are normal. She exhibits no distension. There is no tenderness. There is no guarding.   Musculoskeletal: She exhibits edema (trace in the lower extremities. Lt >Rt).   Neurological: She is alert and oriented to person, place, and time.   She is oriented to person, place and time. She has moments of confusion and memory problem.    Skin: Skin is warm and dry. She is not diaphoretic. There is pallor.   Psychiatric: She has a normal mood and affect.   Vitals reviewed.      Significant Labs:   CBC:     Recent Labs  Lab 04/11/18  0534 04/12/18  0501   WBC 15.88* 13.91*   HGB 9.9* 9.5*   HCT 32.0* 30.9*    210    and CMP:     Recent Labs  Lab 04/11/18  0534 04/12/18  0501    138   K 3.7 3.6   * 110   CO2 20* 17*   * 167*   BUN 23 31*   CREATININE 1.9* 2.1*   CALCIUM 7.9* 7.8*   PROT 5.5* 5.8*   ALBUMIN 2.1* 2.1*   BILITOT 0.8 0.5   ALKPHOS 106 106   AST 37 38   ALT 15 16   ANIONGAP 7* 11   EGFRNONAA 25.8* 22.8*        Diagnostic Results:  I have reviewed all pertinent imaging results/findings within the past 24 hours.

## 2018-04-12 NOTE — PROGRESS NOTES
[facility]  Hematology/Oncology  Progress Note    Patient Name: Naty St  Admission Date: 4/9/2018  Hospital Length of Stay: 2 days  Code Status: DNR     Subjective:     HPI:  72 year old female with DVT on Xeralto , Breast CA (1994), HTN, pacreatic CA (1998), meningioma s/p resection,  seizure disorder on Keppra, lung cancer in Feb 2016 with mets to brain and R hip, stroke in 10/17 with no residual deficit, and deppression. She presented to the ED with 2 days of fatigue and subjective fevers.   On the weekend she was feeling increasingly tired and weak. She was ambulating with assistance to the bathroom which is not her baseline. She was more short of breath and she has a dry cough that is worse than usual. She reports orthopnea due to bad coughing spills when she lay flat. She also reports some legs swellings, palpitations, and nausea. Her temp at home was 99.3.   She denies any change in appetite, urinary sx, diarrhea, wounds, rashes, or mouth sores.        Oncologic history from last clinic note:   Ms. Naty St was admitted to the hospital between 01/25/2016 and 01/28/2016. She has a history of pancreatic cancer 17 years ago, breast cancer and meningioma, presented to the hospital complaining of loss of consciousness. The patient apparently was in her normal state of health and she stood up to go to the bathroom and fell to the floor. The patient's daughter helped the mother up in the bathroom and noted that her mother's upper extremities were shaking and eye rolling. No reports of bowel or bladder incontinence, tongue biting or rolling. The second episode lasted about four minutes and she was extremely lethargic following that. Apparently, the patient had a meningioma resection done in the past; however, recently, underwent neurosurgical resection with Dr. Ferrera on 01/12/2016 and pathology from that revealed malignant neoplasm with multiple features pointing towards  "metastatic papillary serous adenocarcinoma.    Additional immunohistochemical stains were performed which revealed the tumor cells to be are positive for TTF1 and negative for ER and GCDFP. The morphology and TTF1 positivity are most consistent with lung primary.    Of note, imaging scan at the end of January 2016 revealed a mass in the lung at 2 cm in the medial aspect of the apical segment of the right upper lobe abutting the mediastinum at the level of the azygous vein and abutting and possibly encasing the segmental bronchi and vessels of the apical segment of the right upper lobe and no pleural fluid was present. Also, there is an enlarged right paratracheal lymph node. No evidence of any metastatic disease at the pancreatic site with postoperative changes post Whipple disease  Her PET Scan from 2/15/16 reveal "Hypermetabolic mass in the right lung apex consistent with a primary malignancy. Hypermetabolic mediastinal lymph nodes consistent with metastatic disease. Right sacral hypermetabolic lesion consistent with metastatic disease, noting additional mildly sclerotic lesions in multiple vertebral bodies which do not demonstrate abnormal hypermetabolism  She underwent IR bone biopsy which revealed metastatic adenocarcinoma of lung origin. 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  She has completed focal RT to brain lesions in March 2016.    PET scan from 6/6/16 shows "Dramatic almost complete response to therapy."  Lovenox started after US lower ext 6/7/16 shows Acute complete occlusion of one of the left posterior tibial vein.Remote partial thrombus of the proximal left superficial femoral vein.  She is on Tarceva.   12/6/16 PET scan reveals "Stable right upper lobe lesion and right hilar lymph node. No new lesions identified. Trace left pleural effusion".  1/27/17 Renal u/s "Medical renal disease. Nonobstructive right nephrolithiasis"  1/31/17 PET - "Right upper lobe nodule and right hilar lymph node similar " "and very low grade activity.  There is no definite evidence of recurrence."   11/9/17 PET "In this patient with history of lung cancer, there is interval increase in size and hypermetabolism of a right upper lobe lung lesion concerning for recurrent disease. Additionally, there is interval appearance of a hypermetabolic paratracheal lymph node suspicious for metastatic disease"   She progressed on Tarceva She underwent EBUS on 12/5/17. Pathology revealed adenocarcinoma but T790m could not be done as quantity was not insufficient. Her PET scan from 1/30/18 revealed The patient's previously identified abnormal lesions is slightly greater uptake of FDG on today's study compared with prior exam. These findings are concerning for progression of disease"    Interval History:   Patient seen and examined this AM. Patient had continued high oxygen demand yesterday. Lasix 40mg IV given yesterday with good UOP, 1.1L negative in past 24h. Patient reports subjective improvement but still remains with SOB. No new chest pain, abdo pain, n/v, diarrhea.     Oncology Treatment Plan:   [No treatment plan]    Medications:  Continuous Infusions:  Scheduled Meds:   albuterol-ipratropium 2.5mg-0.5mg/3mL  3 mL Nebulization Q4H    amitriptyline  50 mg Oral QHS    apixaban  10 mg Oral BID    atorvastatin  40 mg Oral Daily    ceFEPime (MAXIPIME) IVPB  1 g Intravenous Daily    dexamethasone  4 mg Intravenous Q12H    guaiFENesin  600 mg Oral BID    levETIRAcetam  750 mg Oral BID    lipase-protease-amylase 12,000-38,000-60,000 units  1 capsule Oral TID WM    sodium bicarbonate  650 mg Oral TID     PRN Meds:sodium chloride, acetaminophen, dextrose 50%, dextrose 50%, glucagon (human recombinant), glucose, glucose, LORazepam, ondansetron, promethazine (PHENERGAN) IVPB, sodium chloride, sodium chloride 0.9%     Review of Systems   Constitutional: Positive for activity change. Negative for appetite change, fatigue and fever.   HENT: " Negative for congestion and sinus pain.    Respiratory: Positive for shortness of breath. Negative for cough.    Cardiovascular: Positive for leg swelling. Negative for chest pain and palpitations.   Gastrointestinal: Negative for abdominal distention, abdominal pain, diarrhea, nausea and vomiting.   Genitourinary: Negative for decreased urine volume and dysuria.   Skin: Negative for rash and wound.   Neurological: Negative for dizziness and headaches.     Objective:     Vital Signs (Most Recent):  Temp: 97.4 °F (36.3 °C) (04/12/18 0517)  Pulse: 101 (04/12/18 0517)  Resp: 20 (04/12/18 0517)  BP: (!) 107/59 (04/12/18 0517)  SpO2: (!) 90 % (04/12/18 0517) Vital Signs (24h Range):  Temp:  [96.3 °F (35.7 °C)-99 °F (37.2 °C)] 97.4 °F (36.3 °C)  Pulse:  [] 101  Resp:  [16-36] 20  SpO2:  [85 %-97 %] 90 %  BP: ()/(53-59) 107/59     Weight: 68 kg (149 lb 14.6 oz)  Body mass index is 27.42 kg/m².  Body surface area is 1.72 meters squared.      Intake/Output Summary (Last 24 hours) at 04/12/18 0718  Last data filed at 04/11/18 1800   Gross per 24 hour   Intake              270 ml   Output             1400 ml   Net            -1130 ml       Physical Exam   Constitutional: She is oriented to person, place, and time. She appears well-developed and well-nourished. No distress.   HENT:   Head: Normocephalic and atraumatic.   Eyes: EOM are normal. Right eye exhibits no discharge. Left eye exhibits no discharge.   Neck: Neck supple.   Cardiovascular: Normal rate, regular rhythm, normal heart sounds and intact distal pulses.    No murmur heard.  Pulmonary/Chest: No respiratory distress. She has no wheezes.   - On 10 L comfort flow.   - good air movement  - no wheeze  - crackles improved with diuresis      Abdominal: Soft. Bowel sounds are normal. She exhibits no distension. There is no tenderness. There is no guarding.   Musculoskeletal: She exhibits edema (trace in the lower extremities. Lt >Rt).   Neurological: She is  alert and oriented to person, place, and time.   She is oriented to person, place and time. She has moments of confusion and memory problem.    Skin: Skin is warm and dry. She is not diaphoretic. There is pallor.   Psychiatric: She has a normal mood and affect.   Vitals reviewed.      Significant Labs:   CBC:     Recent Labs  Lab 04/11/18  0534 04/12/18  0501   WBC 15.88* 13.91*   HGB 9.9* 9.5*   HCT 32.0* 30.9*    210    and CMP:     Recent Labs  Lab 04/11/18  0534 04/12/18  0501    138   K 3.7 3.6   * 110   CO2 20* 17*   * 167*   BUN 23 31*   CREATININE 1.9* 2.1*   CALCIUM 7.9* 7.8*   PROT 5.5* 5.8*   ALBUMIN 2.1* 2.1*   BILITOT 0.8 0.5   ALKPHOS 106 106   AST 37 38   ALT 15 16   ANIONGAP 7* 11   EGFRNONAA 25.8* 22.8*       Diagnostic Results:  I have reviewed all pertinent imaging results/findings within the past 24 hours.    Assessment/Plan:     * Community acquired pneumonia    Patient presenting with worsening non productive cough and SOB. CXR with abnormal infiltration vs congestion. Her WBC is 12.33 K.  She is not on home oxygen. She was on 2-4 L of oxygen in the ED and her sats are in the lower 90s%.   - She received one dose of 2 g ceftriaxone IV  - 4/10 she is on 5 L nc of oxygen.  She spiked a fever of 101.2 overnight  - 4/11: O2 requirements increased to 10 L comfort flow. CXR with worsening edema and small effusion.   - continue on broad spectrum antibiotics          Stage 3 chronic kidney disease    - CKD   - strict I/Os  - avoid nephrotoxic drugs, renally dose meds  - continue to monitor          Acute hypoxemic respiratory failure    - not on home O2 but needed 4L NC upon arrival  - acute decompensation overnight 4/10-4/11 and currently on 10L HFNC  - unclear etiology but likely multifactorial and related to CHF and pneumonia on top of her lung cancer with other ddx including aspiration, pneumonitis, TRALI  - given lasix 40mg iv x1 4/11 with good UOP  - continue duonebs  scheduled  - continue dex 4q12  - pulmonary consulted, appreciate d[place]ota        Anemia    Patient was given 1 unit of blood on 4/10 with appropriate response.         Diastolic dysfunction    Patient presented with failure to thrive. There was a concern of new onset Heart failure as she reports DELA CRUZ and orthopnea.   - 2D echo shows diastolic dysfunction and normal EF.   - CXR with worsening pulmonary congestion.   - strict I/Os  - daily weights  - continue to monitor          History of stroke    She had a stroke in October/17.   - Will continue Lipitor 40 daily.         Seizure disorder    - Will continue her home dose of Keppra.         Anticoagulant long-term use    - was on Xarelto for DVT at home  - switched to eliquis given borderline renal function, even at baseline (10 mg BID for 7 days then 5 mg BID. It was started on 4/11)        Malignant neoplasm of lower lobe of right lung    We are holding her chemotherapy for now.         History of breast cancer    - See HPI for more details.         Essential hypertension    - takes norvasc 5mg at home  - hold norvasc given borderline BP  - continue to monitor          DISPO: pending clinical improvement    Maurisio Almazan MD (PGY-5)  Hematology/Oncology Fellow  Will discuss with Dr. Pierce (Hematology/Oncology Staff)      ATTENDING NOTE, ONCOLOGY INPATIENT TEAM    As above; events of last 24 hours noted.  Patient seen and examined, chart reviewed.  Appears more comfortable today after being diuresed, in NAD.  Lungs are clear to auscultation.  Abdomen is soft, nontender.  Labs reviewed.    PLAN  Follow labs daily.  Repeat chest x ray in am.  Continue antibiotics.  Continue dexamethasone.  Pulmonary Service to evaluate.  Continue apixaban.  Attempt to slowly wean off O2.  We will follow    Og Pierce MD

## 2018-04-12 NOTE — TELEPHONE ENCOUNTER
----- Message from Alex Carmona MD sent at 4/11/2018  5:35 PM CDT -----  Biopsies of the stomach were ok.  No signs of H pylori or cause of iron deficiency.    MA to call patient with results.

## 2018-04-12 NOTE — ASSESSMENT & PLAN NOTE
Patient initially admitted with concern for pneumonia and treated with ceftriaxone; however, later escalated to cefepime and vanc given fever spike and leukocytosis  - not on home O2 but needed 4L NC upon arrival initially; however, had acute increase in oxygen requirements on 04/10 s/p 1 unit pRBCs for Hgb: 7.3  - chest x-ray showing worsening pulmonary edema  - acute increase in oxygen needs likely secondary to viral infection with superimposed transfusion related lung injury in patient having never previously received blood products and increased requirements occurring after transfusion  - would recommend continued diuresis as tolerated (watch increase in Cr as s/p 60mg IV lasix there is already an elevation of Cr)   - recommend supportive care because if TRALI is the etiology, it will take time to resolve fully   - wean oxygen as tolerated - today at 6-8L   - can continue scheduled steroids - could consider solumedrol vs dexamethasone although overall benefit of steroids in TRALI is controversial

## 2018-04-12 NOTE — ASSESSMENT & PLAN NOTE
Patient presented with failure to thrive. There was a concern of new onset Heart failure as she reports DELA CRUZ and orthopnea.   - 2D echo shows diastolic dysfunction and normal EF.   - CXR with worsening pulmonary congestion.   - strict I/Os  - daily weights  - continue to monitor

## 2018-04-12 NOTE — SUBJECTIVE & OBJECTIVE
"Past Medical History:   Diagnosis Date    Anticoagulant long-term use     Blood clot in vein 06/2016    Breast cancer 1994    Cataract     Hypertension     Pancreatic cancer 1998    Posterior capsular opacification, left eye     Psychiatric problem     Seizures 01/25/2016    Stroke     Therapy        Past Surgical History:   Procedure Laterality Date    BONE BIOSPY  3/9/16    BRAIN SURGERY  1/12/16    tumor removal 1/12/16    BREAST LUMPECTOMY  1998    left    CATARACT EXTRACTION Bilateral 2004    yag OU    CHOLECYSTECTOMY  1998    COLONOSCOPY N/A 11/13/2015    Procedure: COLONOSCOPY;  Surgeon: Alex Carmona MD;  Location: [facility] ([address] [place] [address]);  Service: Endoscopy;  Laterality: N/A;  2 year f/u    cysto and right ureteral stent  4/27/12    ecoli  2010    removal of blockage, done throat, then through the liver.    HYSTERECTOMY  1974    KIDNEY STONE SURGERY  2010--laser    left eswl  6/20/12    OOPHORECTOMY  4/25/2012    Laparoscopic BSO, lysis of adhesions, cystoscopy greater than 35mins     WHIPPLE PROCEDURE W/ LAPAROSCOPY  1998       Review of patient's allergies indicates:   Allergen Reactions    Tobradex [tobramycin-dexamethasone] Swelling    Iodinated contrast- oral and iv dye Hives    Morphine Other (See Comments)     "shaking" and tremors    Latex Rash    Phenytoin sodium extended Other (See Comments) and Rash       Family History     Problem Relation (Age of Onset)    Breast cancer Mother    Leukemia Paternal Grandfather    Lung cancer Mother    Stomach cancer Paternal Grandmother        Social History Main Topics    Smoking status: Former Smoker     Packs/day: 0.00     Years: 0.00     Quit date: 9/26/1961    Smokeless tobacco: Never Used    Alcohol use No    Drug use: No    Sexual activity: Yes     Partners: Male         Review of Systems   Constitutional: Positive for activity change. Negative for chills, fatigue, fever and unexpected weight change.   HENT: Negative for " congestion and sneezing.    Respiratory: Positive for cough. Negative for choking, chest tightness, shortness of breath and wheezing.    Cardiovascular: Negative for chest pain, palpitations and leg swelling.   Gastrointestinal: Negative for abdominal pain, diarrhea, nausea and vomiting.   Genitourinary: Negative for difficulty urinating and urgency.   Musculoskeletal: Negative for arthralgias and myalgias.   Skin: Negative for color change, pallor and rash.   Neurological: Positive for weakness. Negative for dizziness, tremors, numbness and headaches.   Psychiatric/Behavioral: Negative for agitation and confusion.     Objective:     Vital Signs (Most Recent):  Temp: 98.1 °F (36.7 °C) (04/12/18 1119)  Pulse: 108 (04/12/18 1155)  Resp: 16 (04/12/18 1119)  BP: (!) 115/56 (04/12/18 1119)  SpO2: 100 % (04/12/18 1119) Vital Signs (24h Range):  Temp:  [96.3 °F (35.7 °C)-98.1 °F (36.7 °C)] 98.1 °F (36.7 °C)  Pulse:  [] 108  Resp:  [16-32] 16  SpO2:  [90 %-100 %] 100 %  BP: ()/(53-59) 115/56     Weight: 68 kg (149 lb 14.6 oz)  Body mass index is 27.42 kg/m².      Intake/Output Summary (Last 24 hours) at 04/12/18 1441  Last data filed at 04/12/18 1100   Gross per 24 hour   Intake              570 ml   Output             1300 ml   Net             -730 ml       Physical Exam   Constitutional: She is oriented to person, place, and time. She appears well-developed and well-nourished. No distress.   Elderly appearing lady in NAD    HENT:   Head: Normocephalic and atraumatic.   Eyes: EOM are normal. Pupils are equal, round, and reactive to light.   Neck: Normal range of motion. Neck supple. No JVD present. No thyromegaly present.   Cardiovascular: Regular rhythm.  Exam reveals gallop.    tachycardiac   Pulmonary/Chest: Effort normal. No stridor. No respiratory distress. She has no rales. She exhibits no tenderness.   On 8L comfort flow   Abdominal: Soft. Bowel sounds are normal. She exhibits no distension. There is no  tenderness.   Musculoskeletal: Normal range of motion. She exhibits no edema.   Neurological: She is alert and oriented to person, place, and time.   Skin: Skin is warm and dry. Capillary refill takes less than 2 seconds. She is not diaphoretic. No erythema. There is pallor.   Psychiatric: She has a normal mood and affect.       Vents:  Oxygen Concentration (%): 100 (04/10/18 2133)    Lines/Drains/Airways     Peripheral Intravenous Line                 Peripheral IV - Single Lumen Right Wrist -- days                Significant Labs:    CBC/Anemia Profile:    Recent Labs  Lab 04/11/18  0534 04/12/18  0501   WBC 15.88* 13.91*   HGB 9.9* 9.5*   HCT 32.0* 30.9*    210   MCV 84 83   RDW 18.6* 19.2*        Chemistries:    Recent Labs  Lab 04/11/18  0534 04/12/18  0501    138   K 3.7 3.6   * 110   CO2 20* 17*   BUN 23 31*   CREATININE 1.9* 2.1*   CALCIUM 7.9* 7.8*   ALBUMIN 2.1* 2.1*   PROT 5.5* 5.8*   BILITOT 0.8 0.5   ALKPHOS 106 106   ALT 15 16   AST 37 38   MG 1.9 2.2   PHOS 2.4* 3.4     All pertinent labs within the past 24 hours have been reviewed.    Significant Imaging:   I have reviewed all pertinent imaging results/findings within the past 24 hours.

## 2018-04-12 NOTE — SUBJECTIVE & OBJECTIVE
"Past Medical History:   Diagnosis Date    Anticoagulant long-term use     Blood clot in vein 06/2016    Breast cancer 1994    Cataract     Hypertension     Pancreatic cancer 1998    Posterior capsular opacification, left eye     Psychiatric problem     Seizures 01/25/2016    Stroke     Therapy        Past Surgical History:   Procedure Laterality Date    BONE BIOSPY  3/9/16    BRAIN SURGERY  1/12/16    tumor removal 1/12/16    BREAST LUMPECTOMY  1998    left    CATARACT EXTRACTION Bilateral 2004    yag OU    CHOLECYSTECTOMY  1998    COLONOSCOPY N/A 11/13/2015    Procedure: COLONOSCOPY;  Surgeon: Alex Carmona MD;  Location: [facility] ([address] [place] [address]);  Service: Endoscopy;  Laterality: N/A;  2 year f/u    cysto and right ureteral stent  4/27/12    ecoli  2010    removal of blockage, done throat, then through the liver.    HYSTERECTOMY  1974    KIDNEY STONE SURGERY  2010--laser    left eswl  6/20/12    OOPHORECTOMY  4/25/2012    Laparoscopic BSO, lysis of adhesions, cystoscopy greater than 35mins     WHIPPLE PROCEDURE W/ LAPAROSCOPY  1998       Review of patient's allergies indicates:   Allergen Reactions    Tobradex [tobramycin-dexamethasone] Swelling    Iodinated contrast- oral and iv dye Hives    Morphine Other (See Comments)     "shaking" and tremors    Latex Rash    Phenytoin sodium extended Other (See Comments) and Rash       Family History     Problem Relation (Age of Onset)    Breast cancer Mother    Leukemia Paternal Grandfather    Lung cancer Mother    Stomach cancer Paternal Grandmother        Social History Main Topics    Smoking status: Former Smoker     Packs/day: 0.00     Years: 0.00     Quit date: 9/26/1961    Smokeless tobacco: Never Used    Alcohol use No    Drug use: No    Sexual activity: Yes     Partners: Male         Review of Systems   Constitutional: Positive for activity change. Negative for chills, fatigue, fever and unexpected weight change.   HENT: Negative for " congestion and sneezing.    Respiratory: Positive for cough. Negative for choking, chest tightness, shortness of breath and wheezing.    Cardiovascular: Negative for chest pain, palpitations and leg swelling.   Gastrointestinal: Negative for abdominal pain, diarrhea, nausea and vomiting.   Genitourinary: Negative for difficulty urinating and urgency.   Musculoskeletal: Negative for arthralgias and myalgias.   Skin: Negative for color change, pallor and rash.   Neurological: Positive for weakness. Negative for dizziness, tremors, numbness and headaches.   Psychiatric/Behavioral: Negative for agitation and confusion.     Objective:     Vital Signs (Most Recent):  Temp: 98.1 °F (36.7 °C) (04/12/18 1119)  Pulse: 108 (04/12/18 1155)  Resp: 16 (04/12/18 1119)  BP: (!) 115/56 (04/12/18 1119)  SpO2: 100 % (04/12/18 1119) Vital Signs (24h Range):  Temp:  [96.3 °F (35.7 °C)-98.1 °F (36.7 °C)] 98.1 °F (36.7 °C)  Pulse:  [] 108  Resp:  [16-32] 16  SpO2:  [90 %-100 %] 100 %  BP: ()/(53-59) 115/56     Weight: 68 kg (149 lb 14.6 oz)  Body mass index is 27.42 kg/m².      Intake/Output Summary (Last 24 hours) at 04/12/18 1407  Last data filed at 04/12/18 1100   Gross per 24 hour   Intake              570 ml   Output             1300 ml   Net             -730 ml       Physical Exam   Constitutional: She is oriented to person, place, and time. She appears well-developed and well-nourished. No distress.   Elderly appearing lady in NAD    HENT:   Head: Normocephalic and atraumatic.   Eyes: EOM are normal. Pupils are equal, round, and reactive to light.   Neck: Normal range of motion. Neck supple. No JVD present. No thyromegaly present.   Cardiovascular: Regular rhythm.  Exam reveals gallop.    tachycardiac   Pulmonary/Chest: Effort normal. No stridor. No respiratory distress. She has no rales. She exhibits no tenderness.   On 8L comfort flow   Abdominal: Soft. Bowel sounds are normal. She exhibits no distension. There is no  tenderness.   Musculoskeletal: Normal range of motion. She exhibits no edema.   Neurological: She is alert and oriented to person, place, and time.   Skin: Skin is warm and dry. Capillary refill takes less than 2 seconds. She is not diaphoretic. No erythema. There is pallor.   Psychiatric: She has a normal mood and affect.     Vents:  Oxygen Concentration (%): 100 (04/10/18 2133)    Lines/Drains/Airways     Peripheral Intravenous Line                 Peripheral IV - Single Lumen Right Wrist -- days              Significant Labs:    CBC/Anemia Profile:    Recent Labs  Lab 04/11/18  0534 04/12/18  0501   WBC 15.88* 13.91*   HGB 9.9* 9.5*   HCT 32.0* 30.9*    210   MCV 84 83   RDW 18.6* 19.2*     Chemistries:    Recent Labs  Lab 04/11/18  0534 04/12/18  0501    138   K 3.7 3.6   * 110   CO2 20* 17*   BUN 23 31*   CREATININE 1.9* 2.1*   CALCIUM 7.9* 7.8*   ALBUMIN 2.1* 2.1*   PROT 5.5* 5.8*   BILITOT 0.8 0.5   ALKPHOS 106 106   ALT 15 16   AST 37 38   MG 1.9 2.2   PHOS 2.4* 3.4       BMP:   Recent Labs  Lab 04/12/18  0501   *      K 3.6      CO2 17*   BUN 31*   CREATININE 2.1*   CALCIUM 7.8*   MG 2.2     CMP:   Recent Labs  Lab 04/11/18  0534 04/12/18  0501    138   K 3.7 3.6   * 110   CO2 20* 17*   * 167*   BUN 23 31*   CREATININE 1.9* 2.1*   CALCIUM 7.9* 7.8*   PROT 5.5* 5.8*   ALBUMIN 2.1* 2.1*   BILITOT 0.8 0.5   ALKPHOS 106 106   AST 37 38   ALT 15 16   ANIONGAP 7* 11   EGFRNONAA 25.8* 22.8*     Lactic Acid:   Recent Labs  Lab 04/10/18  2205   LACTATE 1.0     Significant Imaging:   I have reviewed all pertinent imaging results/findings within the past 24 hours.

## 2018-04-12 NOTE — ASSESSMENT & PLAN NOTE
- was on Xarelto for DVT at home  - switched to eliquis given borderline renal function, even at baseline (10 mg BID for 7 days then 5 mg BID. It was started on 4/11)

## 2018-04-12 NOTE — ASSESSMENT & PLAN NOTE
Patient initially admitted with concern for pneumonia and treated with ceftriaxone; however, later escalated to cefepime and vanc given fever spike and leukocytosis  - not on home O2 but needed 4L NC upon arrival initially; however, had acute increase in oxygen requirements on 04/10 s/p 1 unit pRBCs for Hgb: 7.3  - chest x-ray showing worsening pulmonary edema  - acute increase in oxygen needs likely secondary to viral infection with superimposed transfusion related lung injury in patient having never previously received blood products and increased requirements occurring after transfusion  - would recommend continued diuresis as tolerated (watch increase in Cr as s/p 60mg IV lasix there is already an elevation of Cr)   - recommend supportive care because if TRALI is the etiology, it will take time to resolve fully   - wean oxygen as tolerated - today at 6-8l; can use CPAP qhs PRN   - can continue scheduled steroids - could consider solumedrol vs dexamethasone although overall benefit of steroids in TRALI is controversial   - suggest obtaining full respiratory viral panel as only flu was obtained and negative

## 2018-04-12 NOTE — ASSESSMENT & PLAN NOTE
- not on home O2 but needed 4L NC upon arrival  - acute decompensation overnight 4/10-4/11 and currently on 10L HFNC  - unclear etiology but likely multifactorial and related to CHF and pneumonia on top of her lung cancer with other ddx including aspiration, pneumonitis, TRALI  - given lasix 40mg iv x1 4/11 with good UOP  - continue duonebs scheduled  - continue dex 4q12  - pulmonary consulted, appreciate recs

## 2018-04-12 NOTE — PLAN OF CARE
Problem: Patient Care Overview  Goal: Plan of Care Review  Outcome: Ongoing (interventions implemented as appropriate)  Pt remained free from falls during shift. VS stable. Afebrile. AAOx4. On 7L high flow nasal canula, stating at 93% on continuous SpO2 monitor. Pt needs encouragement to eat. Received PRN Murelax for constipation. Questions answered. Pt participating in plan of care. Bed locked and in lowest position. Call light within reach. Bed rails up x 2. Will continue to monitor.

## 2018-04-12 NOTE — CARE UPDATE
RN Proactive Rounding Note  Time of Visit: 2100    Admit Date: 2018  LOS: 1  Code Status: DNR   Date of Visit: 2018  : 1945  Age: 72 y.o.  Sex: female  Bed: 826/826 A:   MRN: [id number]  Was the patient discharged from an ICU this admission? no   Was the patient discharged from a PACU within last 24 hours? no  Did the patient receive conscious sedation/general anesthesia in last 24 hours? no  Was the patient in the ED within the past 24 hours? no  Was the patient started on NIPPV within the past 24 hours? no  Attending Physician: Og Pierce MD  Primary Service: [facility] MEDICAL ONCOLOGY      ASSESSMENT:     Abnormal Vital Signs: SpO2 92%, RR 32  Clinical Issues: Respiratory     INTERVENTIONS/ RECOMMENDATIONS:     Patient with lung ca with mets complicated with PNA infection.  Upon arrival to bedside, patient is sitting in a high fowlers, appears comfortable and having dinner with son at bedside.  Currently on 10L high-flow NC, oxygen saturation 90-93%.  Patient denies feeling SOB, is not observed to utilize accessory muscles, and on auscultation, breath sounds are clear.  Patient received several doses of lasix IVP today and has had 1,400 ml urine ouput since.  Patient already on abx therapy and steroid injections q12, duo-neb breathing tx q4hrs.  No additional recommendations at this time.      Discussed plan of care with Eva LEE CN Phyllis    PHYSICIAN ESCALATION:     Yes/No no    Orders received and case discussed with        Disposition: remain on floor    FOLLOW-UP/CONTINGENCY:       Call back the Rapid Response Nurse at x [phone]  for additional questions or concerns

## 2018-04-12 NOTE — PLAN OF CARE
Problem: SLP Goal  Goal: SLP Goal  Speech Language Pathology Goals  Goals expected to be met by 4/19  1. Modified Barium Swallow Study to rule out aspiration   2. Pt will tolerate regular & thin liquids with no s/s aspiration  SLP Clinical Swallow Evaluation completed. See note for details.     Esther Ngo MS, CCC-SLP  Speech Language Pathologist  Pager: [phone]  4/12/2018

## 2018-04-13 LAB
ALBUMIN SERPL BCP-MCNC: 2.3 G/DL
ALP SERPL-CCNC: 114 U/L
ALT SERPL W/O P-5'-P-CCNC: 26 U/L
ANION GAP SERPL CALC-SCNC: 10 MMOL/L
ANISOCYTOSIS BLD QL SMEAR: SLIGHT
AST SERPL-CCNC: 47 U/L
BASOPHILS # BLD AUTO: 0.02 K/UL
BASOPHILS NFR BLD: 0.1 %
BILIRUB SERPL-MCNC: 0.5 MG/DL
BUN SERPL-MCNC: 33 MG/DL
BURR CELLS BLD QL SMEAR: ABNORMAL
CALCIUM SERPL-MCNC: 8.3 MG/DL
CHLORIDE SERPL-SCNC: 109 MMOL/L
CO2 SERPL-SCNC: 22 MMOL/L
CREAT SERPL-MCNC: 1.8 MG/DL
DIFFERENTIAL METHOD: ABNORMAL
EOSINOPHIL # BLD AUTO: 0 K/UL
EOSINOPHIL NFR BLD: 0 %
ERYTHROCYTE [DISTWIDTH] IN BLOOD BY AUTOMATED COUNT: 19.5 %
EST. GFR  (AFRICAN AMERICAN): 31.7 ML/MIN/1.73 M^2
EST. GFR  (NON AFRICAN AMERICAN): 27.5 ML/MIN/1.73 M^2
GLUCOSE SERPL-MCNC: 136 MG/DL
HCT VFR BLD AUTO: 33.4 %
HGB BLD-MCNC: 10.2 G/DL
HYPOCHROMIA BLD QL SMEAR: ABNORMAL
IMM GRANULOCYTES # BLD AUTO: 0.23 K/UL
IMM GRANULOCYTES NFR BLD AUTO: 1 %
LYMPHOCYTES # BLD AUTO: 0.7 K/UL
LYMPHOCYTES NFR BLD: 3.2 %
MCH RBC QN AUTO: 25.3 PG
MCHC RBC AUTO-ENTMCNC: 30.5 G/DL
MCV RBC AUTO: 83 FL
MONOCYTES # BLD AUTO: 1.2 K/UL
MONOCYTES NFR BLD: 5.3 %
NEUTROPHILS # BLD AUTO: 20.4 K/UL
NEUTROPHILS NFR BLD: 90.4 %
NRBC BLD-RTO: 0 /100 WBC
OVALOCYTES BLD QL SMEAR: ABNORMAL
PLATELET # BLD AUTO: 323 K/UL
PLATELET BLD QL SMEAR: ABNORMAL
PMV BLD AUTO: 10.1 FL
POIKILOCYTOSIS BLD QL SMEAR: ABNORMAL
POLYCHROMASIA BLD QL SMEAR: ABNORMAL
POTASSIUM SERPL-SCNC: 3.5 MMOL/L
PROT SERPL-MCNC: 6.4 G/DL
RBC # BLD AUTO: 4.03 M/UL
SODIUM SERPL-SCNC: 141 MMOL/L
WBC # BLD AUTO: 22.62 K/UL

## 2018-04-13 PROCEDURE — 63600175 PHARM REV CODE 636 W HCPCS: Performed by: STUDENT IN AN ORGANIZED HEALTH CARE EDUCATION/TRAINING PROGRAM

## 2018-04-13 PROCEDURE — 25000003 PHARM REV CODE 250: Performed by: STUDENT IN AN ORGANIZED HEALTH CARE EDUCATION/TRAINING PROGRAM

## 2018-04-13 PROCEDURE — 80053 COMPREHEN METABOLIC PANEL: CPT

## 2018-04-13 PROCEDURE — 27000221 HC OXYGEN, UP TO 24 HOURS

## 2018-04-13 PROCEDURE — 85025 COMPLETE CBC W/AUTO DIFF WBC: CPT

## 2018-04-13 PROCEDURE — 63600175 PHARM REV CODE 636 W HCPCS: Performed by: INTERNAL MEDICINE

## 2018-04-13 PROCEDURE — 97530 THERAPEUTIC ACTIVITIES: CPT

## 2018-04-13 PROCEDURE — 99233 SBSQ HOSP IP/OBS HIGH 50: CPT | Mod: ,,, | Performed by: INTERNAL MEDICINE

## 2018-04-13 PROCEDURE — 36415 COLL VENOUS BLD VENIPUNCTURE: CPT

## 2018-04-13 PROCEDURE — 99232 SBSQ HOSP IP/OBS MODERATE 35: CPT | Mod: GC,,, | Performed by: INTERNAL MEDICINE

## 2018-04-13 PROCEDURE — 20600001 HC STEP DOWN PRIVATE ROOM

## 2018-04-13 PROCEDURE — 94761 N-INVAS EAR/PLS OXIMETRY MLT: CPT

## 2018-04-13 PROCEDURE — 92526 ORAL FUNCTION THERAPY: CPT

## 2018-04-13 PROCEDURE — 25000242 PHARM REV CODE 250 ALT 637 W/ HCPCS: Performed by: STUDENT IN AN ORGANIZED HEALTH CARE EDUCATION/TRAINING PROGRAM

## 2018-04-13 PROCEDURE — 97116 GAIT TRAINING THERAPY: CPT

## 2018-04-13 PROCEDURE — 94640 AIRWAY INHALATION TREATMENT: CPT

## 2018-04-13 RX ORDER — PANTOPRAZOLE SODIUM 40 MG/1
40 TABLET, DELAYED RELEASE ORAL
Status: DISCONTINUED | OUTPATIENT
Start: 2018-04-13 | End: 2018-04-13

## 2018-04-13 RX ORDER — DEXAMETHASONE 0.5 MG/5ML
4 ELIXIR ORAL EVERY 12 HOURS
Status: DISCONTINUED | OUTPATIENT
Start: 2018-04-13 | End: 2018-04-16 | Stop reason: HOSPADM

## 2018-04-13 RX ORDER — PANTOPRAZOLE SODIUM 40 MG/1
40 TABLET, DELAYED RELEASE ORAL DAILY
Status: DISCONTINUED | OUTPATIENT
Start: 2018-04-13 | End: 2018-04-13

## 2018-04-13 RX ORDER — PANTOPRAZOLE SODIUM 40 MG/1
40 TABLET, DELAYED RELEASE ORAL DAILY
Status: DISCONTINUED | OUTPATIENT
Start: 2018-04-14 | End: 2018-04-16 | Stop reason: HOSPADM

## 2018-04-13 RX ADMIN — PANCRELIPASE 1 CAPSULE: 60000; 12000; 38000 CAPSULE, DELAYED RELEASE PELLETS ORAL at 12:04

## 2018-04-13 RX ADMIN — IPRATROPIUM BROMIDE AND ALBUTEROL SULFATE 3 ML: .5; 3 SOLUTION RESPIRATORY (INHALATION) at 12:04

## 2018-04-13 RX ADMIN — IPRATROPIUM BROMIDE AND ALBUTEROL SULFATE 3 ML: .5; 3 SOLUTION RESPIRATORY (INHALATION) at 05:04

## 2018-04-13 RX ADMIN — SODIUM BICARBONATE 650 MG TABLET 650 MG: at 02:04

## 2018-04-13 RX ADMIN — PANCRELIPASE 1 CAPSULE: 60000; 12000; 38000 CAPSULE, DELAYED RELEASE PELLETS ORAL at 04:04

## 2018-04-13 RX ADMIN — GUAIFENESIN 600 MG: 600 TABLET, EXTENDED RELEASE ORAL at 08:04

## 2018-04-13 RX ADMIN — SODIUM BICARBONATE 650 MG TABLET 650 MG: at 08:04

## 2018-04-13 RX ADMIN — APIXABAN 10 MG: 2.5 TABLET, FILM COATED ORAL at 08:04

## 2018-04-13 RX ADMIN — DEXAMETHASONE 4 MG: 0.5 ELIXIR ORAL at 08:04

## 2018-04-13 RX ADMIN — AMITRIPTYLINE HYDROCHLORIDE 50 MG: 25 TABLET, FILM COATED ORAL at 08:04

## 2018-04-13 RX ADMIN — DEXAMETHASONE 4 MG: 0.5 ELIXIR ORAL at 01:04

## 2018-04-13 RX ADMIN — IPRATROPIUM BROMIDE AND ALBUTEROL SULFATE 3 ML: .5; 3 SOLUTION RESPIRATORY (INHALATION) at 11:04

## 2018-04-13 RX ADMIN — IPRATROPIUM BROMIDE AND ALBUTEROL SULFATE 3 ML: .5; 3 SOLUTION RESPIRATORY (INHALATION) at 08:04

## 2018-04-13 RX ADMIN — IPRATROPIUM BROMIDE AND ALBUTEROL SULFATE 3 ML: .5; 3 SOLUTION RESPIRATORY (INHALATION) at 04:04

## 2018-04-13 RX ADMIN — ATORVASTATIN CALCIUM 40 MG: 20 TABLET, FILM COATED ORAL at 08:04

## 2018-04-13 RX ADMIN — PANCRELIPASE 1 CAPSULE: 60000; 12000; 38000 CAPSULE, DELAYED RELEASE PELLETS ORAL at 07:04

## 2018-04-13 RX ADMIN — POLYETHYLENE GLYCOL 3350 17 G: 17 POWDER, FOR SOLUTION ORAL at 08:04

## 2018-04-13 RX ADMIN — LORAZEPAM 1 MG: 1 TABLET ORAL at 09:04

## 2018-04-13 RX ADMIN — LEVETIRACETAM 750 MG: 750 TABLET ORAL at 08:04

## 2018-04-13 RX ADMIN — DEXAMETHASONE SODIUM PHOSPHATE 4 MG: 4 INJECTION, SOLUTION INTRAMUSCULAR; INTRAVENOUS at 08:04

## 2018-04-13 RX ADMIN — CEFEPIME 1 G: 1 INJECTION, POWDER, FOR SOLUTION INTRAMUSCULAR; INTRAVENOUS at 08:04

## 2018-04-13 RX ADMIN — PANTOPRAZOLE SODIUM 40 MG: 40 TABLET, DELAYED RELEASE ORAL at 09:04

## 2018-04-13 NOTE — PLAN OF CARE
Problem: Occupational Therapy Goal  Goal: Occupational Therapy Goal  Goals to be met by: 4/24/18     Patient will increase functional independence with ADLs by performing:    UE Dressing with Supervision.  LE Dressing with Supervision.  Grooming while standing with Supervision.  Toileting from toilet with Supervision for hygiene and clothing management.   Toilet transfer to toilet with Supervision.     Pt. Tolerated session well.

## 2018-04-13 NOTE — PROGRESS NOTES
[facility]  Pulmonology  Progress Note    Patient Name: Naty St  MRN: [id number]  Admission Date: 4/9/2018  Hospital Length of Stay: 3 days  Code Status: DNR  Attending Provider: Yahaira Najera MD  Primary Care Provider: Olvin Mars MD   Principal Problem: Community acquired pneumonia    Subjective:     Interval History: 72 year old who has been improving over the past few days.  She continues to have some increased O2 requirements from when she was here initially but overall feels better.  No complaints today    Objective:     Vital Signs (Most Recent):  Temp: 98.1 °F (36.7 °C) (04/13/18 1104)  Pulse: 107 (04/13/18 1150)  Resp: 18 (04/13/18 1150)  BP: (!) 117/59 (04/13/18 1104)  SpO2: (!) 92 % (04/13/18 1150) Vital Signs (24h Range):  Temp:  [97.4 °F (36.3 °C)-98.3 °F (36.8 °C)] 98.1 °F (36.7 °C)  Pulse:  [] 107  Resp:  [16-28] 18  SpO2:  [88 %-98 %] 92 %  BP: (105-123)/(53-69) 117/59     Weight: 68 kg (149 lb 14.6 oz)  Body mass index is 27.42 kg/m².      Intake/Output Summary (Last 24 hours) at 04/13/18 1432  Last data filed at 04/13/18 1214   Gross per 24 hour   Intake              480 ml   Output             1500 ml   Net            -1020 ml       Physical Exam   Constitutional: She is oriented to person, place, and time. No distress.   HENT:   Head: Normocephalic and atraumatic.   Neck: Normal range of motion. No JVD present. No tracheal deviation present.   Cardiovascular: Normal rate, regular rhythm and normal heart sounds.  Exam reveals no gallop and no friction rub.    No murmur heard.  Pulmonary/Chest: Effort normal. No respiratory distress. She has no wheezes. She has rales.   Musculoskeletal: Normal range of motion. She exhibits no edema.   Neurological: She is alert and oriented to person, place, and time.   Skin: She is not diaphoretic.       Vents:  Oxygen Concentration (%): 100 (04/10/18 7361)    Lines/Drains/Airways     Peripheral Intravenous Line                  Peripheral IV - Single Lumen Right Wrist -- days                Significant Labs:    CBC/Anemia Profile:    Recent Labs  Lab 04/12/18  0501 04/13/18  0722   WBC 13.91* 22.62*   HGB 9.5* 10.2*   HCT 30.9* 33.4*    323   MCV 83 83   RDW 19.2* 19.5*        Chemistries:    Recent Labs  Lab 04/12/18  0501 04/13/18  0722    141   K 3.6 3.5    109   CO2 17* 22*   BUN 31* 33*   CREATININE 2.1* 1.8*   CALCIUM 7.8* 8.3*   ALBUMIN 2.1* 2.3*   PROT 5.8* 6.4   BILITOT 0.5 0.5   ALKPHOS 106 114   ALT 16 26   AST 38 47*   MG 2.2  --    PHOS 3.4  --        ABGs: No results for input(s): PH, PCO2, HCO3, POCSATURATED, BE in the last 48 hours.  All pertinent labs within the past 24 hours have been reviewed.    Significant Imaging:  I have reviewed all pertinent imaging results/findings within the past 24 hours.    Assessment/Plan:     Acute hypoxemic respiratory failure    Patient initially admitted with concern for pneumonia and treated with ceftriaxone; however, later escalated to cefepime and vanc given fever spike and leukocytosis.  Now improving would start to de-escalate abx.  - decreasing O2 requirements keep titrating to keeps sats greater than 88%  - chest x-ray showing worsening pulmonary edema, likely TRALI vs TACO but is improving  - Initially could have been due to a viral URI with transfusion causing a mild ALI.  Improving with supportive care  - continue diuresis.  Patient is net negative 2 liters since admission with subjective improvement  - on desamethasone although overall benefit of steroids in TRALI is controversial   - request respiratory viral panel as only flu was obtained and negative and has poor positive or negative predictive value                  Overall much improved, would de-escalate abx, in the next day or so decrease steroid dosing.  Continue to titrate O2 to maintain sats greater than 88%    Overall close to being able to go home.    Will sign off please call back with any  questions.       Ryan Pride MD  Pulmonology  [facility]

## 2018-04-13 NOTE — ASSESSMENT & PLAN NOTE
Patient presented with failure to thrive. There was a concern of new onset Heart failure as she reports DELA CRUZ and orthopnea.   - 2D echo shows diastolic dysfunction and normal EF.   - CXR with worsening pulmonary congestion.   - strict I/Os  - daily weights  - continue to monitor  - PRN lasix

## 2018-04-13 NOTE — ASSESSMENT & PLAN NOTE
Patient presenting with worsening non productive cough and SOB. CXR with abnormal infiltration vs congestion. Her WBC is 12.33 K.  She is not on home oxygen. She was on 2-4 L of oxygen in the ED and her sats are in the lower 90s%.   - She received one dose of 2 g ceftriaxone IV  - 4/10 she is on 5 L nc of oxygen.  She spiked a fever of 101.2 overnight  - 4/11: O2 requirements increased to 10 L comfort flow. CXR with worsening edema and small effusion.   - 4/13: she is down to 4 L of O2 this morning.    Plan:   - continue on broad spectrum antibiotics  - continue steroids for suspected TRALI   - continue duonebs   - continue to wean her off oxygen as tolerated.

## 2018-04-13 NOTE — PROGRESS NOTES
Admit Assessment    Patient Identification  Naty St   :  1945  Admit Date:  2018  Attending Provider:  Yahaira Najera MD              Referral:   Patient was admitted to Medical Oncology Service with a diagnosis of Metastatic Lung Cancer, and she was admitted this hospital stay due to Fatigue, Fevers, some Leg Swelling, Palpitations, Nausea.  Fatigue [R53.83]  Heart failure [I50.9]  Metastatic lung carcinoma, right [C78.01]  Urinary tract infection without hematuria, site unspecified [N39.0]  Community acquired pneumonia, unspecified laterality [J18.9]  Community acquired pneumonia, unspecified laterality [J18.9]  Community acquired pneumonia, unspecified laterality [J18.9].    Additional oncologic and medical history noted in H&P, in chart.    Oncology Social Worker is involved. Patient was referred to the Social Work Department via admission list/census. Patient presents as a 72 y.o. year old, ,  female.    Persons interviewed, with patient's permission: Briefly spoke with patient during/following Medical Oncology rounds this morning. Met with patient and her  Troy St in patient's room this afternoon. Patient's son Jeff arrived while I was in the room; he had just arrived in town from [facility] this afternoon. Patient was alert, oriented, cooperative, and pleasant; she has difficulty with word finding/memory at times.   Patient    Living Situation:    Lives with:  Troy St ([phone] cell).  Resides at [address] [place] [address].  Phone: [phone] (home).      Alternate/Emergency Contact:  Son, Basil Lopez, [phone] (cell).    Patient was previously ambulatory and able to perform ADLs. She uses a straight cane and commode frame over the commode in the bathroom. Her  assists as needed.    Patient has three sons and one daughter from previous marriage. Two sons reside locally, one son in [facility],  and daughter in [facility].          Current or Past Agencies and Description of Services/Supplies    DME  Agency Name: [facility]  Agency Phone Number: [phone]  Equipment Currently Used at Home: bath bench, wheelchair, walker, standard, raised toilet, cane, straight.  Patient's  reported that something is missing from the bath bench and it is not stable enough to use right now. He also reported that there are corroded areas on the commode frame.      Home Health  Agency Name: [facility]  Agency Phone Number: [phone]  Services: None currently. Patient did have home health nursing, PT, OT, and ST services after having a stroke and being d/c'ed from [facility] last fall.      IV Infusion  None      Nutrition: Oral - regular diet ordered per MDs at present.       Outpatient Pharmacy:     [facility] [place], [place] - [address] [address]  Phone: [phone] Fax: [phone]    [facility] [address] [address]  Phone: [phone] Fax: [phone]    [facility] [address] [address]  Phone: [phone] Fax: [phone]      Patient Preference of agencies include: as listed above.    Patient/Caregiver informed of right to choose providers or agencies.  Patient provides permission to release any necessary information to [facility] and to [facility] agencies as needed to facilitate patient care, treatment planning, and patient discharge planning.  Written and verbal resources to be provided as needed/requested.      Coping  Appropriate to situation.  Patient has good family support.          Adjustment to Diagnosis and Treatment  Appropriate; ongoing process.      Emotional/Behavioral/Cognitive Issues  None noted/observed.          History/Current Symptoms of Anxiety/Depression: Yes - see  H&P.      History/Current Substance Use:   Social History     Social History Main Topics    Smoking status: Former Smoker     Packs/day: 0.00     Years: 0.00     Quit date: 1961    Smokeless tobacco: Never Used    Alcohol use No    Drug use: No    Sexual activity: Yes     Partners: Male       Indications of Abuse/Neglect: No  Abuse Screen  Do You Feel Unsafe at Home, Work or School?: no      Financial:  Payor/Plan Subscr  Sex Relation Sub. Ins. ID Effective Group Num   1. Begun MANAGE* JANE HAYES* 1945 Female  [id number]                                   [address] [address]      Patient is retired. She has a [employer] Medicare HMO insurance plan.                Other identified concerns/needs: SNF vs home health for additional PT and OT needs. Discussed in-network options for SNF and provided patient with a list of facilities obtained through [employer]'s website. MDs have entered order to consult [facility]. Home oxygen.      Plan: Patient to return home with her  via family car.  She will benefit from home health services when she is discharged from Acute or SNF to home.     Interventions/Referrals: To be determined.    Patient/caregiver engaged in treatment planning process.    Oncology Social Worker providing psychosocial and supportive counseling, resources, education, assistance and discharge planning as appropriate.  Patient/caregiver state understanding of  available resources,  following, remains available.    Provided business card containing all contact information for Oncology Social Worker, and encouraged patient and her  to make contact as needed.

## 2018-04-13 NOTE — PLAN OF CARE
Problem: Physical Therapy Goal  Goal: Physical Therapy Goal  Goals to be met by: 18     Patient will increase functional independence with mobility by performin. Supine to sit with Modified [place]  2. Sit to stand transfer with Stand-by Assistance  3. Bed to chair transfer with Stand-by Assistance using LRAD as needed.   4. Gait  x 150 feet with Contact Guard Assistance using LRAD as needed.   5. Ascend/descend 8 stair with bilateral Handrails Contact Guard Assistance.   6. Lower extremity exercise program x15 reps, with supervision, in order to increase LE strength and (I) with functional mobility.      Outcome: Ongoing (interventions implemented as appropriate)  Progressing towards goals    Riley Gamino DPT  2018

## 2018-04-13 NOTE — ASSESSMENT & PLAN NOTE
Patient initially admitted with concern for pneumonia and treated with ceftriaxone; however, later escalated to cefepime and vanc given fever spike and leukocytosis.  Now improving would start to de-escalate abx.  - decreasing O2 requirements keep titrating to keeps sats greater than 88%  - chest x-ray showing worsening pulmonary edema, likely TRALI vs TACO but is improving  - Initially could have been due to a viral URI with transfusion causing a mild ALI.  Improving with supportive care  - continue diuresis.  Patient is net negative 2 liters since admission with subjective improvement  - on desamethasone although overall benefit of steroids in TRALI is controversial   - request respiratory viral panel as only flu was obtained and negative and has poor positive or negative predictive value

## 2018-04-13 NOTE — ASSESSMENT & PLAN NOTE
- not on home O2 but needed 4L NC upon arrival  - acute decompensation overnight 4/10-4/11 and was up to 15L HFNC  - unclear etiology but likely multifactorial and related to CHF and pneumonia on top of her lung cancer with other ddx including aspiration, pneumonitis, TRALI  - given lasix 40mg iv x1 4/11 with good UOP    Plan:   - continue duonebs scheduled  - continue dex 4q12  - pulmonary consulted, appreciate recs

## 2018-04-13 NOTE — PLAN OF CARE
Problem: Patient Care Overview  Goal: Plan of Care Review  Outcome: Ongoing (interventions implemented as appropriate)  Patient AAOx4, VSS, afebrile, and without injury. Fall precautions maintained. Family at bedside. Patient instructed on how to contact the nurse. Patient without distress on 4L high-flow NC at rest, but is requiring more oxygen when working with PT. Patient on regular diet with poor appetite. Oral dexamethasone initiated. IV cefepime continued. No complaints of pain or nausea. Questions and concerns have been addressed; will continue to monitor.

## 2018-04-13 NOTE — SUBJECTIVE & OBJECTIVE
Interval History: 72 year old who has been improving over the past few days.  She continues to have some increased O2 requirements from when she was here initially but overall feels better.  No complaints today    Objective:     Vital Signs (Most Recent):  Temp: 98.1 °F (36.7 °C) (04/13/18 1104)  Pulse: 107 (04/13/18 1150)  Resp: 18 (04/13/18 1150)  BP: (!) 117/59 (04/13/18 1104)  SpO2: (!) 92 % (04/13/18 1150) Vital Signs (24h Range):  Temp:  [97.4 °F (36.3 °C)-98.3 °F (36.8 °C)] 98.1 °F (36.7 °C)  Pulse:  [] 107  Resp:  [16-28] 18  SpO2:  [88 %-98 %] 92 %  BP: (105-123)/(53-69) 117/59     Weight: 68 kg (149 lb 14.6 oz)  Body mass index is 27.42 kg/m².      Intake/Output Summary (Last 24 hours) at 04/13/18 1432  Last data filed at 04/13/18 1214   Gross per 24 hour   Intake              480 ml   Output             1500 ml   Net            -1020 ml       Physical Exam   Constitutional: She is oriented to person, place, and time. No distress.   HENT:   Head: Normocephalic and atraumatic.   Neck: Normal range of motion. No JVD present. No tracheal deviation present.   Cardiovascular: Normal rate, regular rhythm and normal heart sounds.  Exam reveals no gallop and no friction rub.    No murmur heard.  Pulmonary/Chest: Effort normal. No respiratory distress. She has no wheezes. She has rales.   Musculoskeletal: Normal range of motion. She exhibits no edema.   Neurological: She is alert and oriented to person, place, and time.   Skin: She is not diaphoretic.       Vents:  Oxygen Concentration (%): 100 (04/10/18 2133)    Lines/Drains/Airways     Peripheral Intravenous Line                 Peripheral IV - Single Lumen Right Wrist -- days                Significant Labs:    CBC/Anemia Profile:    Recent Labs  Lab 04/12/18  0501 04/13/18  0722   WBC 13.91* 22.62*   HGB 9.5* 10.2*   HCT 30.9* 33.4*    323   MCV 83 83   RDW 19.2* 19.5*        Chemistries:    Recent Labs  Lab 04/12/18  0501 04/13/18  0722     141   K 3.6 3.5    109   CO2 17* 22*   BUN 31* 33*   CREATININE 2.1* 1.8*   CALCIUM 7.8* 8.3*   ALBUMIN 2.1* 2.3*   PROT 5.8* 6.4   BILITOT 0.5 0.5   ALKPHOS 106 114   ALT 16 26   AST 38 47*   MG 2.2  --    PHOS 3.4  --        ABGs: No results for input(s): PH, PCO2, HCO3, POCSATURATED, BE in the last 48 hours.  All pertinent labs within the past 24 hours have been reviewed.    Significant Imaging:  I have reviewed all pertinent imaging results/findings within the past 24 hours.

## 2018-04-13 NOTE — SUBJECTIVE & OBJECTIVE
Interval History: patient O2 requirements and cough are improving. She denies chest pain.     Oncology Treatment Plan:   [No treatment plan]    Medications:  Continuous Infusions:  Scheduled Meds:   albuterol-ipratropium 2.5mg-0.5mg/3mL  3 mL Nebulization Q4H    amitriptyline  50 mg Oral QHS    apixaban  10 mg Oral BID    atorvastatin  40 mg Oral Daily    ceFEPime (MAXIPIME) IVPB  1 g Intravenous Daily    dexamethasone  4 mg Intravenous Q12H    guaiFENesin  600 mg Oral BID    levETIRAcetam  750 mg Oral BID    lipase-protease-amylase 12,000-38,000-60,000 units  1 capsule Oral TID WM    polyethylene glycol  17 g Oral Daily    sodium bicarbonate  650 mg Oral TID     PRN Meds:sodium chloride, acetaminophen, dextrose 50%, dextrose 50%, glucagon (human recombinant), glucose, glucose, LORazepam, ondansetron, promethazine (PHENERGAN) IVPB, sodium chloride, sodium chloride 0.9%     Review of Systems   Constitutional: Positive for activity change. Negative for appetite change, fatigue and fever.   HENT: Negative for congestion and sinus pain.    Respiratory: Positive for shortness of breath. Negative for cough.    Cardiovascular: Positive for leg swelling. Negative for chest pain and palpitations.   Gastrointestinal: Negative for abdominal distention, abdominal pain, diarrhea, nausea and vomiting.   Genitourinary: Negative for decreased urine volume and dysuria.   Skin: Negative for rash and wound.   Neurological: Negative for dizziness and headaches.     Objective:     Vital Signs (Most Recent):  Temp: 97.7 °F (36.5 °C) (04/13/18 0719)  Pulse: 102 (04/13/18 0719)  Resp: 20 (04/13/18 0719)  BP: 112/61 (04/13/18 0719)  SpO2: 97 % (04/13/18 0719) Vital Signs (24h Range):  Temp:  [97.4 °F (36.3 °C)-98.3 °F (36.8 °C)] 97.7 °F (36.5 °C)  Pulse:  [] 102  Resp:  [16-28] 20  SpO2:  [93 %-100 %] 97 %  BP: (105-123)/(53-69) 112/61     Weight: 68 kg (149 lb 14.6 oz)  Body mass index is 27.42 kg/m².  Body surface area is  1.72 meters squared.      Intake/Output Summary (Last 24 hours) at 04/13/18 0804  Last data filed at 04/13/18 0625   Gross per 24 hour   Intake              720 ml   Output             1500 ml   Net             -780 ml       Physical Exam   Constitutional: She is oriented to person, place, and time. She appears well-developed and well-nourished. No distress.   HENT:   Head: Normocephalic and atraumatic.   Eyes: EOM are normal. Right eye exhibits no discharge. Left eye exhibits no discharge.   Neck: Neck supple.   Cardiovascular: Normal rate, regular rhythm, normal heart sounds and intact distal pulses.    No murmur heard.  Pulmonary/Chest: No respiratory distress. She has no wheezes. She has rales (bilatereal basal crackles. (improving)).   On 10 L comfort flow.      Abdominal: Soft. Bowel sounds are normal. She exhibits no distension. There is no tenderness. There is no guarding.   Musculoskeletal: She exhibits edema (1+ in the lower extremities. Lt >Rt).   Neurological: She is alert and oriented to person, place, and time.   She is oriented to person, place and time. She has moments of confusion and memory problem.    Skin: Skin is warm and dry. She is not diaphoretic. There is pallor.   Psychiatric: She has a normal mood and affect.   Vitals reviewed.      Significant Labs:   CBC:     Recent Labs  Lab 04/12/18  0501   WBC 13.91*   HGB 9.5*   HCT 30.9*       and CMP:     Recent Labs  Lab 04/12/18  0501      K 3.6      CO2 17*   *   BUN 31*   CREATININE 2.1*   CALCIUM 7.8*   PROT 5.8*   ALBUMIN 2.1*   BILITOT 0.5   ALKPHOS 106   AST 38   ALT 16   ANIONGAP 11   EGFRNONAA 22.8*       Diagnostic Results:  I have reviewed all pertinent imaging results/findings within the past 24 hours.

## 2018-04-13 NOTE — PT/OT/SLP PROGRESS
Occupational Therapy   Treatment    Name: Naty St  MRN: [id number]  Admitting Diagnosis:  Community acquired pneumonia       Recommendations:     Discharge Recommendations: nursing facility, skilled  Discharge Equipment Recommendations:     Barriers to discharge:       Subjective     Communicated with: nurse prior to session.  Pain/Comfort:  · Pain Rating 1: 0/10  · Pain Rating Post-Intervention 2: 0/10    Patients cultural, spiritual, Taoist conflicts given the current situation: none    Objective:     Patient found with: telemetry, pulse ox (continuous), oxygen (seated on soda with spouse present)    General Precautions: Standard, fall, aspiration   Orthopedic Precautions:N/A   Braces: N/A     Occupational Performance:    Bed Mobility:    · Not tested     Functional Mobility/Transfers:  · Patient completed Sit <> Stand Transfer with minimum assistance  with  rolling walker x 2 trials from EOB  · Functional Mobility: Pt. Performed marching in place x 2 sets 10 reps with RW and CGA/Min A  · Pt. Performed stepping in all directions x 2 to 3 steps with use of RW and CGA/ Min A with 1 LOB posteriorly when stepping backwards with Min A to recover.     Activities of Daily Living:  · Not performed on this date    Patient left up on sofa  with all lines intact, call button in reach, murse notified and family present    [facility] 6 Click:  [facility] Total Score: 18    Treatment & Education:  Pt. Educated on deep breathing techniques  Pt. 02 sats decreased to 84% after first dynamic standing trial but recovered to 92 % with rest break  O2 sats decreased to 87% on after second standing trial  With marching in place but did recover to 93 % after rest break   Education:    Assessment:     Naty St is a 72 y.o. female with a medical diagnosis of Community acquired pneumonia.  She presents with deficits with functional mobility, self-care skills as well as overall endurance for activities on this date with  02 sats noted to decline with minimal activity. .  Performance deficits affecting function are impaired endurance, impaired self care skills, impaired functional mobilty, impaired cardiopulmonary response to activity, weakness, gait instability.  Pt. Would benefit from continued OT services.    Rehab Prognosis:  Good; patient would benefit from acute skilled OT services to address these deficits and reach maximum level of function.       Plan:     Patient to be seen 4 x/week to address the above listed problems via self-care/home management, therapeutic activities, therapeutic exercises  · Plan of Care Expires: 05/10/18  · Plan of Care Reviewed with: patient, spouse    This Plan of care has been discussed with the patient who was involved in its development and understands and is in agreement with the identified goals and treatment plan    GOALS:    Occupational Therapy Goals        Problem: Occupational Therapy Goal    Goal Priority Disciplines Outcome Interventions   Occupational Therapy Goal     OT, PT/OT Ongoing (interventions implemented as appropriate)    Description:  Goals to be met by: 4/24/18     Patient will increase functional independence with ADLs by performing:    UE Dressing with Supervision.  LE Dressing with Supervision.  Grooming while standing with Supervision.  Toileting from toilet with Supervision for hygiene and clothing management.   Toilet transfer to toilet with Supervision.                      Time Tracking:     OT Date of Treatment: 04/13/18  OT Start Time: 1201  OT Stop Time: 1220  OT Total Time (min): 19 min    Billable Minutes:Therapeutic Activity 19    LEOBARDO Larson  4/13/2018

## 2018-04-13 NOTE — PT/OT/SLP PROGRESS
Physical Therapy Treatment    Patient Name:  Naty St   MRN:  [id number]    Recommendations:     Discharge Recommendations:  nursing facility, skilled   Discharge Equipment Recommendations:     Barriers to discharge: Inaccessible home and Decreased caregiver support    Assessment:     Naty St is a 72 y.o. female admitted with a medical diagnosis of Community acquired pneumonia.  She presents with the following impairments/functional limitations:  impaired endurance, impaired cardiopulmonary response to activity, impaired self care skills, weakness, impaired functional mobilty, gait instability, impaired balance.  Pt tolerated treatment session c mod c/o SOB.  Pt demo limited tolerance for activities secondary to impaired cardiopulmonary function.  Pt demo impaired balance c 2x LOB during gait training requiring min A for correction.  -O2 sat during treatment session:   -Resting in bed c 3.5L O2 - 92-93%   -Sitting EOB c 3.5L O2 - 88%   -Sitting EOB c 6L O2 - 92%   -Standing c 6L O2 - 88%   -Standing c 8L O2 - 92%   -Ambulating c 8L O2 - 88-90%   -Sitting sofa following gait c 8L O2 - 87-88%   -2mins post ambulation in sitting c 8L O2 - 93%   -Sitting up in sofa at end of session c 3.5L O2 - 91-93%  Pt would benefit from continued skilled acute PT 4x/wk to improve functional mobility.  Still recommending SNF following d/c once medically cleared d/t desat c minimal activity and is unable to safely navigate 8 steps to enter home.      Rehab Prognosis:  good; patient would benefit from acute skilled PT services to address these deficits and reach maximum level of function.      Recent Surgery: * No surgery found *      Plan:     During this hospitalization, patient to be seen 4 x/week to address the above listed problems via gait training, therapeutic exercises, therapeutic activities  · Plan of Care Expires:  05/09/18   Plan of Care Reviewed with: patient    Subjective     Communicated with  "RN and OT prior to session.  Patient found HOB elevated upon PT entry to room, agreeable to treatment.      Chief Complaint: SOB  Patient comments/goals: "I want to get better to go home."  Pain/Comfort:  · Pain Rating 1: 0/10    Patients cultural, spiritual, Orthodox conflicts given the current situation: none    Objective:     Patient found with: telemetry, pulse ox (continuous), oxygen     General Precautions: Standard, aspiration, fall   Orthopedic Precautions:N/A   Braces: N/A     Functional Mobility:  · Bed Mobility:     · Rolling Left:  modified independence  · Scooting: supervision  · Supine to Sit: supervision  · Transfers:     · Sit to Stand:  minimum assistance with rolling walker  · Demo posterior lean once full stand achieved requiring min A for correction.  · Gait: 12ft c RW CGA - 8L O2  · Decreased gait speed, decreased B step length, mild unsteadiness noted, 2x LOB req min A from writing therapist to correct.  · Balance: Static standing (SBA); dynamic standing (CGA)      AM-PAC 6 CLICK MOBILITY  Turning over in bed (including adjusting bedclothes, sheets and blankets)?: 4  Sitting down on and standing up from a chair with arms (e.g., wheelchair, bedside commode, etc.): 3  Moving from lying on back to sitting on the side of the bed?: 3  Moving to and from a bed to a chair (including a wheelchair)?: 3  Need to walk in hospital room?: 3  Climbing 3-5 steps with a railing?: 1  Total Score: 17       Therapeutic Activities and Exercises:  Educated on PT role/POC; performing BLE exercises at least 40x/day; energy conservation; limiting talking while amb to reduce SOB; d/c recs - v/u  Sat EOB x5mins S  Sit<>stand from bed 2x c RW min A  Amb 12ft c RW CGA  Therex: (AP, LAQ, hip flex) 1x10 ea    Patient left up in chair with all lines intact, call button in reach and RN notified..    GOALS:    Physical Therapy Goals        Problem: Physical Therapy Goal    Goal Priority Disciplines Outcome Goal Variances " Interventions   Physical Therapy Goal     PT/OT, PT Ongoing (interventions implemented as appropriate)     Description:  Goals to be met by: 18     Patient will increase functional independence with mobility by performin. Supine to sit with Modified [place]  2. Sit to stand transfer with Stand-by Assistance  3. Bed to chair transfer with Stand-by Assistance using LRAD as needed.   4. Gait  x 150 feet with Contact Guard Assistance using LRAD as needed.   5. Ascend/descend 8 stair with bilateral Handrails Contact Guard Assistance.   6. Lower extremity exercise program x15 reps, with supervision, in order to increase LE strength and (I) with functional mobility.                       Time Tracking:     PT Received On: 18  PT Start Time: 0950     PT Stop Time: 1016  PT Total Time (min): 26 min     Billable Minutes: Gait Training 10 min and Therapeutic Activity 16 min    Treatment Type: Treatment  PT/PTA: PT           Riley Gamino, PT  2018

## 2018-04-13 NOTE — PT/OT/SLP PROGRESS
Speech Language Pathology Treatment  Discharge    Patient Name:  Naty St   MRN:  [id number]  Admitting Diagnosis: Community acquired pneumonia    Recommendations:                 General Recommendations:  Follow-up not indicated  Diet recommendations:  Regular, Liquid Diet Level: Thin   Aspiration Precautions: 1 bite/sip at a time, Feed only when awake/alert, HOB to 90 degrees and Small bites/sips   General Precautions: Standard, aspiration, fall      Subjective     Awake/alert    Pain/Comfort:  · Pain Rating 1: 0/10  · Pain Rating Post-Intervention 1: 0/10    Objective:     Has the patient been evaluated by SLP for swallowing?   Yes  Keep patient NPO? No   Current Respiratory Status: nasal cannula      Pt upright on sofa eating breakfast upon entry. Pt tolerated single and consecutive sips of thin liquid via straw and cup with timely swallow initiation and no overt s/s of airway compromise across 12 trials. Toast tolerated x2 with adequate oral clearance and mastication time. No overt s/s of aspiration noted. SLP reviewed swallow precautions with pt and spouse. Recommend continue regular diet/thin liquids at this time. No further ST recommended at this time.  If there is concern for silent aspiration arises please order MBS to determine appropriate diet. SLP reviewed recs with primary team.     Assessment:     Naty St is a 72 y.o. female with an SLP diagnosis of Dysphagia.  Goals:    SLP Goals        Problem: SLP Goal    Goal Priority Disciplines Outcome   SLP Goal     SLP    Description:  Speech Language Pathology Goals  Goals expected to be met by 4/19  1. Modified Barium Swallow Study to rule out aspiration D/C GOAL  2. Pt will tolerate regular & thin liquids with no s/s aspiration  MET                    Plan:     · Patient to be seen:  4 x/week   · Plan of Care expires:  05/11/18  · Plan of Care reviewed with:  patient, spouse   · SLP Follow-Up:  Yes       Discharge recommendations:   nursing facility, skilled     Time Tracking:     SLP Treatment Date:   04/13/18  Speech Start Time:  1042  Speech Stop Time:  1054     Speech Total Time (min):  12 min    Billable Minutes: Treatment Swallowing Dysfunction 12    Roseline Lin CCC-SLP  04/13/2018

## 2018-04-13 NOTE — PROGRESS NOTES
[facility]  Hematology/Oncology  Progress Note    Patient Name: Naty St  Admission Date: 4/9/2018  Hospital Length of Stay: 3 days  Code Status: DNR     Subjective:     HPI:  72 year old female with DVT on Xeralto , Breast CA (1994), HTN, pacreatic CA (1998), meningioma s/p resection,  seizure disorder on Keppra, lung cancer in Feb 2016 with mets to brain and R hip, stroke in 10/17 with no residual deficit, and deppression. She presented to the ED with 2 days of fatigue and subjective fevers.   On the weekend she was feeling increasingly tired and weak. She was ambulating with assistance to the bathroom which is not her baseline. She was more short of breath and she has a dry cough that is worse than usual. She reports orthopnea due to bad coughing spills when she lay flat. She also reports some legs swellings, palpitations, and nausea. Her temp at home was 99.3.   She denies any change in appetite, urinary sx, diarrhea, wounds, rashes, or mouth sores.        Oncologic history from last clinic note:   Ms. Naty St was admitted to the hospital between 01/25/2016 and 01/28/2016. She has a history of pancreatic cancer 17 years ago, breast cancer and meningioma, presented to the hospital complaining of loss of consciousness. The patient apparently was in her normal state of health and she stood up to go to the bathroom and fell to the floor. The patient's daughter helped the mother up in the bathroom and noted that her mother's upper extremities were shaking and eye rolling. No reports of bowel or bladder incontinence, tongue biting or rolling. The second episode lasted about four minutes and she was extremely lethargic following that. Apparently, the patient had a meningioma resection done in the past; however, recently, underwent neurosurgical resection with Dr. Ferrera on 01/12/2016 and pathology from that revealed malignant neoplasm with multiple features pointing towards  "metastatic papillary serous adenocarcinoma.    Additional immunohistochemical stains were performed which revealed the tumor cells to be are positive for TTF1 and negative for ER and GCDFP. The morphology and TTF1 positivity are most consistent with lung primary.    Of note, imaging scan at the end of January 2016 revealed a mass in the lung at 2 cm in the medial aspect of the apical segment of the right upper lobe abutting the mediastinum at the level of the azygous vein and abutting and possibly encasing the segmental bronchi and vessels of the apical segment of the right upper lobe and no pleural fluid was present. Also, there is an enlarged right paratracheal lymph node. No evidence of any metastatic disease at the pancreatic site with postoperative changes post Whipple disease  Her PET Scan from 2/15/16 reveal "Hypermetabolic mass in the right lung apex consistent with a primary malignancy. Hypermetabolic mediastinal lymph nodes consistent with metastatic disease. Right sacral hypermetabolic lesion consistent with metastatic disease, noting additional mildly sclerotic lesions in multiple vertebral bodies which do not demonstrate abnormal hypermetabolism  She underwent IR bone biopsy which revealed metastatic adenocarcinoma of lung origin. 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  She has completed focal RT to brain lesions in March 2016.    PET scan from 6/6/16 shows "Dramatic almost complete response to therapy."  Lovenox started after US lower ext 6/7/16 shows Acute complete occlusion of one of the left posterior tibial vein.Remote partial thrombus of the proximal left superficial femoral vein.  She is on Tarceva.   12/6/16 PET scan reveals "Stable right upper lobe lesion and right hilar lymph node. No new lesions identified. Trace left pleural effusion".  1/27/17 Renal u/s "Medical renal disease. Nonobstructive right nephrolithiasis"  1/31/17 PET - "Right upper lobe nodule and right hilar lymph node similar " "and very low grade activity.  There is no definite evidence of recurrence."   11/9/17 PET "In this patient with history of lung cancer, there is interval increase in size and hypermetabolism of a right upper lobe lung lesion concerning for recurrent disease. Additionally, there is interval appearance of a hypermetabolic paratracheal lymph node suspicious for metastatic disease"   She progressed on Tarceva She underwent EBUS on 12/5/17. Pathology revealed adenocarcinoma but T790m could not be done as quantity was not insufficient. Her PET scan from 1/30/18 revealed The patient's previously identified abnormal lesions is slightly greater uptake of FDG on today's study compared with prior exam. These findings are concerning for progression of disease"    Interval History: patient O2 requirements and cough are improving. She denies chest pain.     Oncology Treatment Plan:   [No treatment plan]    Medications:  Continuous Infusions:  Scheduled Meds:   albuterol-ipratropium 2.5mg-0.5mg/3mL  3 mL Nebulization Q4H    amitriptyline  50 mg Oral QHS    apixaban  10 mg Oral BID    atorvastatin  40 mg Oral Daily    ceFEPime (MAXIPIME) IVPB  1 g Intravenous Daily    dexamethasone  4 mg Intravenous Q12H    guaiFENesin  600 mg Oral BID    levETIRAcetam  750 mg Oral BID    lipase-protease-amylase 12,000-38,000-60,000 units  1 capsule Oral TID WM    polyethylene glycol  17 g Oral Daily    sodium bicarbonate  650 mg Oral TID     PRN Meds:sodium chloride, acetaminophen, dextrose 50%, dextrose 50%, glucagon (human recombinant), glucose, glucose, LORazepam, ondansetron, promethazine (PHENERGAN) IVPB, sodium chloride, sodium chloride 0.9%     Review of Systems   Constitutional: Positive for activity change. Negative for appetite change, fatigue and fever.   HENT: Negative for congestion and sinus pain.    Respiratory: Positive for shortness of breath. Negative for cough.    Cardiovascular: Positive for leg swelling. Negative " for chest pain and palpitations.   Gastrointestinal: Negative for abdominal distention, abdominal pain, diarrhea, nausea and vomiting.   Genitourinary: Negative for decreased urine volume and dysuria.   Skin: Negative for rash and wound.   Neurological: Negative for dizziness and headaches.     Objective:     Vital Signs (Most Recent):  Temp: 97.7 °F (36.5 °C) (04/13/18 0719)  Pulse: 102 (04/13/18 0719)  Resp: 20 (04/13/18 0719)  BP: 112/61 (04/13/18 0719)  SpO2: 97 % (04/13/18 0719) Vital Signs (24h Range):  Temp:  [97.4 °F (36.3 °C)-98.3 °F (36.8 °C)] 97.7 °F (36.5 °C)  Pulse:  [] 102  Resp:  [16-28] 20  SpO2:  [93 %-100 %] 97 %  BP: (105-123)/(53-69) 112/61     Weight: 68 kg (149 lb 14.6 oz)  Body mass index is 27.42 kg/m².  Body surface area is 1.72 meters squared.      Intake/Output Summary (Last 24 hours) at 04/13/18 0804  Last data filed at 04/13/18 0625   Gross per 24 hour   Intake              720 ml   Output             1500 ml   Net             -780 ml       Physical Exam   Constitutional: She is oriented to person, place, and time. She appears well-developed and well-nourished. No distress.   HENT:   Head: Normocephalic and atraumatic.   Eyes: EOM are normal. Right eye exhibits no discharge. Left eye exhibits no discharge.   Neck: Neck supple.   Cardiovascular: Normal rate, regular rhythm, normal heart sounds and intact distal pulses.    No murmur heard.  Pulmonary/Chest: No respiratory distress. She has no wheezes. She has rales (bilatereal basal crackles. (improving)).   On 10 L comfort flow.      Abdominal: Soft. Bowel sounds are normal. She exhibits no distension. There is no tenderness. There is no guarding.   Musculoskeletal: She exhibits edema (1+ in the lower extremities. Lt >Rt).   Neurological: She is alert and oriented to person, place, and time.   She is oriented to person, place and time. She has moments of confusion and memory problem.    Skin: Skin is warm and dry. She is not  diaphoretic. There is pallor.   Psychiatric: She has a normal mood and affect.   Vitals reviewed.      Significant Labs:   CBC:     Recent Labs  Lab 04/12/18  0501   WBC 13.91*   HGB 9.5*   HCT 30.9*       and CMP:     Recent Labs  Lab 04/12/18  0501      K 3.6      CO2 17*   *   BUN 31*   CREATININE 2.1*   CALCIUM 7.8*   PROT 5.8*   ALBUMIN 2.1*   BILITOT 0.5   ALKPHOS 106   AST 38   ALT 16   ANIONGAP 11   EGFRNONAA 22.8*       Diagnostic Results:  I have reviewed all pertinent imaging results/findings within the past 24 hours.    Assessment/Plan:     * Community acquired pneumonia    Patient presenting with worsening non productive cough and SOB. CXR with abnormal infiltration vs congestion. Her WBC is 12.33 K.  She is not on home oxygen. She was on 2-4 L of oxygen in the ED and her sats are in the lower 90s%.   - She received one dose of 2 g ceftriaxone IV  - 4/10 she is on 5 L nc of oxygen.  She spiked a fever of 101.2 overnight  - 4/11: O2 requirements increased to 10 L comfort flow. CXR with worsening edema and small effusion.   - 4/13: she is down to 4 L of O2 this morning.    Plan:   - continue on broad spectrum antibiotics  - continue steroids for suspected TRALI   - continue duonebs   - continue to wean her off oxygen as tolerated.           Acute hypoxemic respiratory failure    - not on home O2 but needed 4L NC upon arrival  - acute decompensation overnight 4/10-4/11 and was up to 15L HFNC  - unclear etiology but likely multifactorial and related to CHF and pneumonia on top of her lung cancer with other ddx including aspiration, pneumonitis, TRALI  - given lasix 40mg iv x1 4/11 with good UOP    Plan:   - continue duonebs scheduled  - continue dex 4q12  - pulmonary consulted, appreciate recs        Essential hypertension    - takes norvasc 5mg at home  - hold norvasc given borderline BP  - continue to monitor        Anticoagulant long-term use    - was on Xarelto for DVT at  home  - switched to eliquis given borderline renal function, even at baseline (10 mg BID for 7 days then 5 mg BID. It was started on 4/11)        Anemia    Patient was given 1 unit of blood on 4/10 with appropriate response.         Diastolic dysfunction    Patient presented with failure to thrive. There was a concern of new onset Heart failure as she reports DELA CRUZ and orthopnea.   - 2D echo shows diastolic dysfunction and normal EF.   - CXR with worsening pulmonary congestion.   - strict I/Os  - daily weights  - continue to monitor  - PRN lasix           Seizure disorder    - Will continue her home dose of Keppra.         Stage 3 chronic kidney disease    - CKD   - strict I/Os  - avoid nephrotoxic drugs, renally dose meds  - continue to monitor          History of stroke    She had a stroke in October/17.   - Will continue Lipitor 40 daily.         Malignant neoplasm of lower lobe of right lung    We are holding her chemotherapy for now.         History of breast cancer    - See HPI for more details.                  Emily Phillip MD  Hematology/Oncology  [facility]

## 2018-04-14 LAB
ABO + RH BLD: NORMAL
ALBUMIN SERPL BCP-MCNC: 2.2 G/DL
ALP SERPL-CCNC: 100 U/L
ALT SERPL W/O P-5'-P-CCNC: 28 U/L
ANION GAP SERPL CALC-SCNC: 9 MMOL/L
AST SERPL-CCNC: 42 U/L
BACTERIA BLD CULT: NORMAL
BACTERIA BLD CULT: NORMAL
BASOPHILS # BLD AUTO: 0.01 K/UL
BASOPHILS NFR BLD: 0.1 %
BILIRUB SERPL-MCNC: 0.5 MG/DL
BLD GP AB SCN CELLS X3 SERPL QL: NORMAL
BUN SERPL-MCNC: 32 MG/DL
CALCIUM SERPL-MCNC: 7.9 MG/DL
CHLORIDE SERPL-SCNC: 109 MMOL/L
CO2 SERPL-SCNC: 21 MMOL/L
CREAT SERPL-MCNC: 1.7 MG/DL
DIFFERENTIAL METHOD: ABNORMAL
EOSINOPHIL # BLD AUTO: 0 K/UL
EOSINOPHIL NFR BLD: 0 %
ERYTHROCYTE [DISTWIDTH] IN BLOOD BY AUTOMATED COUNT: 19.5 %
EST. GFR  (AFRICAN AMERICAN): 34 ML/MIN/1.73 M^2
EST. GFR  (NON AFRICAN AMERICAN): 29.5 ML/MIN/1.73 M^2
GLUCOSE SERPL-MCNC: 122 MG/DL
HCT VFR BLD AUTO: 31.4 %
HGB BLD-MCNC: 9.8 G/DL
IMM GRANULOCYTES # BLD AUTO: 0.15 K/UL
IMM GRANULOCYTES NFR BLD AUTO: 0.9 %
LYMPHOCYTES # BLD AUTO: 0.8 K/UL
LYMPHOCYTES NFR BLD: 4.4 %
MAGNESIUM SERPL-MCNC: 2.5 MG/DL
MCH RBC QN AUTO: 25.9 PG
MCHC RBC AUTO-ENTMCNC: 31.2 G/DL
MCV RBC AUTO: 83 FL
MONOCYTES # BLD AUTO: 1.4 K/UL
MONOCYTES NFR BLD: 7.9 %
NEUTROPHILS # BLD AUTO: 15.2 K/UL
NEUTROPHILS NFR BLD: 86.7 %
NRBC BLD-RTO: 0 /100 WBC
PHOSPHATE SERPL-MCNC: 3 MG/DL
PLATELET # BLD AUTO: 311 K/UL
PMV BLD AUTO: 10.1 FL
POTASSIUM SERPL-SCNC: 4.1 MMOL/L
PROT SERPL-MCNC: 5.7 G/DL
RBC # BLD AUTO: 3.78 M/UL
SODIUM SERPL-SCNC: 139 MMOL/L
WBC # BLD AUTO: 17.49 K/UL

## 2018-04-14 PROCEDURE — 36415 COLL VENOUS BLD VENIPUNCTURE: CPT

## 2018-04-14 PROCEDURE — 94640 AIRWAY INHALATION TREATMENT: CPT

## 2018-04-14 PROCEDURE — 25000003 PHARM REV CODE 250: Performed by: STUDENT IN AN ORGANIZED HEALTH CARE EDUCATION/TRAINING PROGRAM

## 2018-04-14 PROCEDURE — 63600175 PHARM REV CODE 636 W HCPCS: Performed by: INTERNAL MEDICINE

## 2018-04-14 PROCEDURE — 83735 ASSAY OF MAGNESIUM: CPT

## 2018-04-14 PROCEDURE — 99900035 HC TECH TIME PER 15 MIN (STAT)

## 2018-04-14 PROCEDURE — 80053 COMPREHEN METABOLIC PANEL: CPT

## 2018-04-14 PROCEDURE — 25000242 PHARM REV CODE 250 ALT 637 W/ HCPCS: Performed by: STUDENT IN AN ORGANIZED HEALTH CARE EDUCATION/TRAINING PROGRAM

## 2018-04-14 PROCEDURE — 63600175 PHARM REV CODE 636 W HCPCS: Performed by: STUDENT IN AN ORGANIZED HEALTH CARE EDUCATION/TRAINING PROGRAM

## 2018-04-14 PROCEDURE — 94761 N-INVAS EAR/PLS OXIMETRY MLT: CPT

## 2018-04-14 PROCEDURE — 86901 BLOOD TYPING SEROLOGIC RH(D): CPT

## 2018-04-14 PROCEDURE — 27000221 HC OXYGEN, UP TO 24 HOURS

## 2018-04-14 PROCEDURE — 85025 COMPLETE CBC W/AUTO DIFF WBC: CPT

## 2018-04-14 PROCEDURE — 94799 UNLISTED PULMONARY SVC/PX: CPT

## 2018-04-14 PROCEDURE — 20600001 HC STEP DOWN PRIVATE ROOM

## 2018-04-14 PROCEDURE — 99232 SBSQ HOSP IP/OBS MODERATE 35: CPT | Mod: GC,,, | Performed by: INTERNAL MEDICINE

## 2018-04-14 PROCEDURE — 84100 ASSAY OF PHOSPHORUS: CPT

## 2018-04-14 RX ORDER — AMOXICILLIN AND CLAVULANATE POTASSIUM 875; 125 MG/1; MG/1
1 TABLET, FILM COATED ORAL EVERY 12 HOURS
Status: DISCONTINUED | OUTPATIENT
Start: 2018-04-14 | End: 2018-04-16 | Stop reason: HOSPADM

## 2018-04-14 RX ORDER — FUROSEMIDE 10 MG/ML
40 INJECTION INTRAMUSCULAR; INTRAVENOUS ONCE
Status: COMPLETED | OUTPATIENT
Start: 2018-04-14 | End: 2018-04-14

## 2018-04-14 RX ADMIN — SODIUM BICARBONATE 650 MG TABLET 650 MG: at 03:04

## 2018-04-14 RX ADMIN — SODIUM BICARBONATE 650 MG TABLET 650 MG: at 08:04

## 2018-04-14 RX ADMIN — IPRATROPIUM BROMIDE AND ALBUTEROL SULFATE 3 ML: .5; 3 SOLUTION RESPIRATORY (INHALATION) at 12:04

## 2018-04-14 RX ADMIN — LORAZEPAM 1 MG: 1 TABLET ORAL at 09:04

## 2018-04-14 RX ADMIN — ATORVASTATIN CALCIUM 40 MG: 20 TABLET, FILM COATED ORAL at 08:04

## 2018-04-14 RX ADMIN — AMITRIPTYLINE HYDROCHLORIDE 50 MG: 25 TABLET, FILM COATED ORAL at 08:04

## 2018-04-14 RX ADMIN — FUROSEMIDE 40 MG: 10 INJECTION, SOLUTION INTRAMUSCULAR; INTRAVENOUS at 10:04

## 2018-04-14 RX ADMIN — PANCRELIPASE 1 CAPSULE: 60000; 12000; 38000 CAPSULE, DELAYED RELEASE PELLETS ORAL at 08:04

## 2018-04-14 RX ADMIN — PANCRELIPASE 1 CAPSULE: 60000; 12000; 38000 CAPSULE, DELAYED RELEASE PELLETS ORAL at 04:04

## 2018-04-14 RX ADMIN — AMOXICILLIN AND CLAVULANATE POTASSIUM 1 TABLET: 875; 125 TABLET, FILM COATED ORAL at 10:04

## 2018-04-14 RX ADMIN — AMOXICILLIN AND CLAVULANATE POTASSIUM 1 TABLET: 875; 125 TABLET, FILM COATED ORAL at 08:04

## 2018-04-14 RX ADMIN — APIXABAN 10 MG: 2.5 TABLET, FILM COATED ORAL at 08:04

## 2018-04-14 RX ADMIN — LEVETIRACETAM 750 MG: 750 TABLET ORAL at 08:04

## 2018-04-14 RX ADMIN — PANTOPRAZOLE SODIUM 40 MG: 40 TABLET, DELAYED RELEASE ORAL at 08:04

## 2018-04-14 RX ADMIN — GUAIFENESIN 600 MG: 600 TABLET, EXTENDED RELEASE ORAL at 08:04

## 2018-04-14 RX ADMIN — DEXAMETHASONE 4 MG: 0.5 ELIXIR ORAL at 08:04

## 2018-04-14 RX ADMIN — IPRATROPIUM BROMIDE AND ALBUTEROL SULFATE 3 ML: .5; 3 SOLUTION RESPIRATORY (INHALATION) at 08:04

## 2018-04-14 RX ADMIN — IPRATROPIUM BROMIDE AND ALBUTEROL SULFATE 3 ML: .5; 3 SOLUTION RESPIRATORY (INHALATION) at 03:04

## 2018-04-14 RX ADMIN — POLYETHYLENE GLYCOL 3350 17 G: 17 POWDER, FOR SOLUTION ORAL at 08:04

## 2018-04-14 RX ADMIN — CEFEPIME 1 G: 1 INJECTION, POWDER, FOR SOLUTION INTRAMUSCULAR; INTRAVENOUS at 08:04

## 2018-04-14 RX ADMIN — PANCRELIPASE 1 CAPSULE: 60000; 12000; 38000 CAPSULE, DELAYED RELEASE PELLETS ORAL at 11:04

## 2018-04-14 NOTE — ASSESSMENT & PLAN NOTE
Patient presenting with worsening non productive cough and SOB. CXR with abnormal infiltration vs congestion. Her WBC is 12.33 K.  She is not on home oxygen. She was on 2-4 L of oxygen in the ED and her sats are in the lower 90s%.   - She received one dose of 2 g ceftriaxone IV  - 4/10 she is on 5 L nc of oxygen.  She spiked a fever of 101.2 overnight  - 4/11: O2 requirements increased to 10 L comfort flow. CXR with worsening edema and small effusion.   - 4/13: she is down to 4 L of O2 this morning.    Plan:   - Switch Cefepime to PO Augmentin.   - continue steroids for suspected TRALI till Monday.  - continue duonebs   - continue to wean her off oxygen as tolerated.

## 2018-04-14 NOTE — SUBJECTIVE & OBJECTIVE
Interval History: patient O2 requirements and cough are improving. She denies chest pain.     Oncology Treatment Plan:   [No treatment plan]    Medications:  Continuous Infusions:  Scheduled Meds:   albuterol-ipratropium 2.5mg-0.5mg/3mL  3 mL Nebulization Q4H    amitriptyline  50 mg Oral QHS    amoxicillin-clavulanate 875-125mg  1 tablet Oral Q12H    apixaban  10 mg Oral BID    atorvastatin  40 mg Oral Daily    dexamethasone  4 mg Oral Q12H    furosemide  40 mg Intravenous Once    guaiFENesin  600 mg Oral BID    levETIRAcetam  750 mg Oral BID    lipase-protease-amylase 12,000-38,000-60,000 units  1 capsule Oral TID WM    pantoprazole  40 mg Oral Daily    polyethylene glycol  17 g Oral Daily    sodium bicarbonate  650 mg Oral TID     PRN Meds:sodium chloride, acetaminophen, dextrose 50%, dextrose 50%, glucagon (human recombinant), glucose, glucose, LORazepam, ondansetron, promethazine (PHENERGAN) IVPB, sodium chloride, sodium chloride 0.9%     Review of Systems   Constitutional: Positive for activity change. Negative for appetite change, fatigue and fever.   HENT: Negative for congestion and sinus pain.    Respiratory: Positive for shortness of breath. Negative for cough.    Cardiovascular: Negative for chest pain, palpitations and leg swelling.   Gastrointestinal: Negative for abdominal distention, abdominal pain, diarrhea, nausea and vomiting.   Genitourinary: Negative for decreased urine volume and dysuria.   Skin: Negative for rash and wound.   Neurological: Negative for dizziness and headaches.     Objective:     Vital Signs (Most Recent):  Temp: 97.6 °F (36.4 °C) (04/14/18 0407)  Pulse: 86 (04/14/18 0855)  Resp: 20 (04/14/18 0855)  BP: 133/70 (04/14/18 0407)  SpO2: (!) 94 % (04/14/18 0855) Vital Signs (24h Range):  Temp:  [97.6 °F (36.4 °C)-98.8 °F (37.1 °C)] 97.6 °F (36.4 °C)  Pulse:  [] 86  Resp:  [14-20] 20  SpO2:  [88 %-98 %] 94 %  BP: (112-133)/(57-70) 133/70     Weight: 68 kg (149 lb  14.6 oz)  Body mass index is 27.42 kg/m².  Body surface area is 1.72 meters squared.      Intake/Output Summary (Last 24 hours) at 04/14/18 0916  Last data filed at 04/14/18 0622   Gross per 24 hour   Intake              480 ml   Output             1700 ml   Net            -1220 ml       Physical Exam   Constitutional: She is oriented to person, place, and time. She appears well-developed and well-nourished. No distress.   HENT:   Head: Normocephalic and atraumatic.   Eyes: EOM are normal. Right eye exhibits no discharge. Left eye exhibits no discharge.   Neck: Neck supple.   Cardiovascular: Normal rate, regular rhythm, normal heart sounds and intact distal pulses.    No murmur heard.  Pulmonary/Chest: No respiratory distress. She has no wheezes. She has rales (bilatereal basal crackles. (improving)).   On 10 L comfort flow.      Abdominal: Soft. Bowel sounds are normal. She exhibits no distension. There is no tenderness. There is no guarding.   Musculoskeletal: She exhibits edema (1+ in the lower extremities. Lt >Rt).   Neurological: She is alert and oriented to person, place, and time.   She is oriented to person, place and time. She has moments of confusion and memory problem.    Skin: Skin is warm and dry. She is not diaphoretic. There is pallor.   Psychiatric: She has a normal mood and affect.   Vitals reviewed.      Significant Labs:   CBC:     Recent Labs  Lab 04/13/18  0722 04/14/18  0633   WBC 22.62* 17.49*   HGB 10.2* 9.8*   HCT 33.4* 31.4*    311    and CMP:     Recent Labs  Lab 04/13/18  0722 04/14/18  0633    139   K 3.5 4.1    109   CO2 22* 21*   * 122*   BUN 33* 32*   CREATININE 1.8* 1.7*   CALCIUM 8.3* 7.9*   PROT 6.4 5.7*   ALBUMIN 2.3* 2.2*   BILITOT 0.5 0.5   ALKPHOS 114 100   AST 47* 42*   ALT 26 28   ANIONGAP 10 9   EGFRNONAA 27.5* 29.5*       Diagnostic Results:  I have reviewed all pertinent imaging results/findings within the past 24 hours.

## 2018-04-14 NOTE — PROGRESS NOTES
[facility]  Hematology/Oncology  Progress Note    Patient Name: Naty St  Admission Date: 4/9/2018  Hospital Length of Stay: 4 days  Code Status: DNR     Subjective:     HPI:  72 year old female with DVT on Xeralto , Breast CA (1994), HTN, pacreatic CA (1998), meningioma s/p resection,  seizure disorder on Keppra, lung cancer in Feb 2016 with mets to brain and R hip, stroke in 10/17 with no residual deficit, and deppression. She presented to the ED with 2 days of fatigue and subjective fevers.   On the weekend she was feeling increasingly tired and weak. She was ambulating with assistance to the bathroom which is not her baseline. She was more short of breath and she has a dry cough that is worse than usual. She reports orthopnea due to bad coughing spills when she lay flat. She also reports some legs swellings, palpitations, and nausea. Her temp at home was 99.3.   She denies any change in appetite, urinary sx, diarrhea, wounds, rashes, or mouth sores.        Oncologic history from last clinic note:   Ms. Naty St was admitted to the hospital between 01/25/2016 and 01/28/2016. She has a history of pancreatic cancer 17 years ago, breast cancer and meningioma, presented to the hospital complaining of loss of consciousness. The patient apparently was in her normal state of health and she stood up to go to the bathroom and fell to the floor. The patient's daughter helped the mother up in the bathroom and noted that her mother's upper extremities were shaking and eye rolling. No reports of bowel or bladder incontinence, tongue biting or rolling. The second episode lasted about four minutes and she was extremely lethargic following that. Apparently, the patient had a meningioma resection done in the past; however, recently, underwent neurosurgical resection with Dr. Ferrera on 01/12/2016 and pathology from that revealed malignant neoplasm with multiple features pointing towards  "metastatic papillary serous adenocarcinoma.    Additional immunohistochemical stains were performed which revealed the tumor cells to be are positive for TTF1 and negative for ER and GCDFP. The morphology and TTF1 positivity are most consistent with lung primary.    Of note, imaging scan at the end of January 2016 revealed a mass in the lung at 2 cm in the medial aspect of the apical segment of the right upper lobe abutting the mediastinum at the level of the azygous vein and abutting and possibly encasing the segmental bronchi and vessels of the apical segment of the right upper lobe and no pleural fluid was present. Also, there is an enlarged right paratracheal lymph node. No evidence of any metastatic disease at the pancreatic site with postoperative changes post Whipple disease  Her PET Scan from 2/15/16 reveal "Hypermetabolic mass in the right lung apex consistent with a primary malignancy. Hypermetabolic mediastinal lymph nodes consistent with metastatic disease. Right sacral hypermetabolic lesion consistent with metastatic disease, noting additional mildly sclerotic lesions in multiple vertebral bodies which do not demonstrate abnormal hypermetabolism  She underwent IR bone biopsy which revealed metastatic adenocarcinoma of lung origin. 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  She has completed focal RT to brain lesions in March 2016.    PET scan from 6/6/16 shows "Dramatic almost complete response to therapy."  Lovenox started after US lower ext 6/7/16 shows Acute complete occlusion of one of the left posterior tibial vein.Remote partial thrombus of the proximal left superficial femoral vein.  She is on Tarceva.   12/6/16 PET scan reveals "Stable right upper lobe lesion and right hilar lymph node. No new lesions identified. Trace left pleural effusion".  1/27/17 Renal u/s "Medical renal disease. Nonobstructive right nephrolithiasis"  1/31/17 PET - "Right upper lobe nodule and right hilar lymph node similar " "and very low grade activity.  There is no definite evidence of recurrence."   11/9/17 PET "In this patient with history of lung cancer, there is interval increase in size and hypermetabolism of a right upper lobe lung lesion concerning for recurrent disease. Additionally, there is interval appearance of a hypermetabolic paratracheal lymph node suspicious for metastatic disease"   She progressed on Tarceva She underwent EBUS on 12/5/17. Pathology revealed adenocarcinoma but T790m could not be done as quantity was not insufficient. Her PET scan from 1/30/18 revealed The patient's previously identified abnormal lesions is slightly greater uptake of FDG on today's study compared with prior exam. These findings are concerning for progression of disease"    Interval History: patient O2 requirements and cough are improving. She denies chest pain.     Oncology Treatment Plan:   [No treatment plan]    Medications:  Continuous Infusions:  Scheduled Meds:   albuterol-ipratropium 2.5mg-0.5mg/3mL  3 mL Nebulization Q4H    amitriptyline  50 mg Oral QHS    amoxicillin-clavulanate 875-125mg  1 tablet Oral Q12H    apixaban  10 mg Oral BID    atorvastatin  40 mg Oral Daily    dexamethasone  4 mg Oral Q12H    furosemide  40 mg Intravenous Once    guaiFENesin  600 mg Oral BID    levETIRAcetam  750 mg Oral BID    lipase-protease-amylase 12,000-38,000-60,000 units  1 capsule Oral TID WM    pantoprazole  40 mg Oral Daily    polyethylene glycol  17 g Oral Daily    sodium bicarbonate  650 mg Oral TID     PRN Meds:sodium chloride, acetaminophen, dextrose 50%, dextrose 50%, glucagon (human recombinant), glucose, glucose, LORazepam, ondansetron, promethazine (PHENERGAN) IVPB, sodium chloride, sodium chloride 0.9%     Review of Systems   Constitutional: Positive for activity change. Negative for appetite change, fatigue and fever.   HENT: Negative for congestion and sinus pain.    Respiratory: Positive for shortness of breath. " Negative for cough.    Cardiovascular: Negative for chest pain, palpitations and leg swelling.   Gastrointestinal: Negative for abdominal distention, abdominal pain, diarrhea, nausea and vomiting.   Genitourinary: Negative for decreased urine volume and dysuria.   Skin: Negative for rash and wound.   Neurological: Negative for dizziness and headaches.     Objective:     Vital Signs (Most Recent):  Temp: 97.6 °F (36.4 °C) (04/14/18 0407)  Pulse: 86 (04/14/18 0855)  Resp: 20 (04/14/18 0855)  BP: 133/70 (04/14/18 0407)  SpO2: (!) 94 % (04/14/18 0855) Vital Signs (24h Range):  Temp:  [97.6 °F (36.4 °C)-98.8 °F (37.1 °C)] 97.6 °F (36.4 °C)  Pulse:  [] 86  Resp:  [14-20] 20  SpO2:  [88 %-98 %] 94 %  BP: (112-133)/(57-70) 133/70     Weight: 68 kg (149 lb 14.6 oz)  Body mass index is 27.42 kg/m².  Body surface area is 1.72 meters squared.      Intake/Output Summary (Last 24 hours) at 04/14/18 0916  Last data filed at 04/14/18 0622   Gross per 24 hour   Intake              480 ml   Output             1700 ml   Net            -1220 ml       Physical Exam   Constitutional: She is oriented to person, place, and time. She appears well-developed and well-nourished. No distress.   HENT:   Head: Normocephalic and atraumatic.   Eyes: EOM are normal. Right eye exhibits no discharge. Left eye exhibits no discharge.   Neck: Neck supple.   Cardiovascular: Normal rate, regular rhythm, normal heart sounds and intact distal pulses.    No murmur heard.  Pulmonary/Chest: No respiratory distress. She has no wheezes. She has rales (bilatereal basal crackles. (improving)).   On 10 L comfort flow.      Abdominal: Soft. Bowel sounds are normal. She exhibits no distension. There is no tenderness. There is no guarding.   Musculoskeletal: She exhibits edema (1+ in the lower extremities. Lt >Rt).   Neurological: She is alert and oriented to person, place, and time.   She is oriented to person, place and time. She has moments of confusion and  memory problem.    Skin: Skin is warm and dry. She is not diaphoretic. There is pallor.   Psychiatric: She has a normal mood and affect.   Vitals reviewed.      Significant Labs:   CBC:     Recent Labs  Lab 04/13/18 0722 04/14/18  0633   WBC 22.62* 17.49*   HGB 10.2* 9.8*   HCT 33.4* 31.4*    311    and CMP:     Recent Labs  Lab 04/13/18 0722 04/14/18  0633    139   K 3.5 4.1    109   CO2 22* 21*   * 122*   BUN 33* 32*   CREATININE 1.8* 1.7*   CALCIUM 8.3* 7.9*   PROT 6.4 5.7*   ALBUMIN 2.3* 2.2*   BILITOT 0.5 0.5   ALKPHOS 114 100   AST 47* 42*   ALT 26 28   ANIONGAP 10 9   EGFRNONAA 27.5* 29.5*       Diagnostic Results:  I have reviewed all pertinent imaging results/findings within the past 24 hours.    Assessment/Plan:     * Community acquired pneumonia    Patient presenting with worsening non productive cough and SOB. CXR with abnormal infiltration vs congestion. Her WBC is 12.33 K.  She is not on home oxygen. She was on 2-4 L of oxygen in the ED and her sats are in the lower 90s%.   - She received one dose of 2 g ceftriaxone IV  - 4/10 she is on 5 L nc of oxygen.  She spiked a fever of 101.2 overnight  - 4/11: O2 requirements increased to 10 L comfort flow. CXR with worsening edema and small effusion.   - 4/13: she is down to 4 L of O2 this morning.    Plan:   - Switch Cefepime to PO Augmentin.   - continue steroids for suspected TRALI till Monday.  - continue duonebs   - continue to wean her off oxygen as tolerated.           Acute hypoxemic respiratory failure    - not on home O2 but needed 4L NC upon arrival  - acute decompensation overnight 4/10-4/11 and was up to 15L HFNC  - unclear etiology but likely multifactorial and related to CHF and pneumonia on top of her lung cancer with other ddx including aspiration, pneumonitis, TRALI  - given lasix 40mg iv x1 4/11 with good UOP  - Will give her another dose of IV lasix 40 mg on 4/14     Plan:   - continue duonebs scheduled  -  continue dex 4q12 PO   - pulmonary consulted, appreciate recs  - Lasix once IV 40 mg.         Essential hypertension    - takes norvasc 5mg at home  - hold norvasc given borderline BP  - continue to monitor        Anticoagulant long-term use    - was on Xarelto for DVT at home  - switched to eliquis given borderline renal function, even at baseline (10 mg BID for 7 days then 5 mg BID. It was started on 4/11)        Anemia    Patient was given 1 unit of blood on 4/10 with appropriate response.         Diastolic dysfunction    Patient presented with failure to thrive. There was a concern of new onset Heart failure as she reports DELA CRUZ and orthopnea.   - 2D echo shows diastolic dysfunction and normal EF.   - CXR with worsening pulmonary congestion.   - strict I/Os  - daily weights  - continue to monitor  - PRN lasix           Seizure disorder    - Will continue her home dose of Keppra.         Stage 3 chronic kidney disease    - CKD   - strict I/Os  - avoid nephrotoxic drugs, renally dose meds  - continue to monitor          History of stroke    She had a stroke in October/17.   - Will continue Lipitor 40 daily.         Malignant neoplasm of lower lobe of right lung    We are holding her chemotherapy for now.         History of breast cancer    - See HPI for more details.                  Emily Phillip MD  Hematology/Oncology  [facility]

## 2018-04-14 NOTE — ASSESSMENT & PLAN NOTE
- not on home O2 but needed 4L NC upon arrival  - acute decompensation overnight 4/10-4/11 and was up to 15L HFNC  - unclear etiology but likely multifactorial and related to CHF and pneumonia on top of her lung cancer with other ddx including aspiration, pneumonitis, TRALI  - given lasix 40mg iv x1 4/11 with good UOP  - Will give her another dose of IV lasix 40 mg on 4/14     Plan:   - continue duonebs scheduled  - continue dex 4q12 PO   - pulmonary consulted, appreciate recs  - Lasix once IV 40 mg.

## 2018-04-14 NOTE — PLAN OF CARE
Problem: Patient Care Overview  Goal: Plan of Care Review  Outcome: Ongoing (interventions implemented as appropriate)  Patient AAOx4, VSS, afebrile, and without injury. Fall precautions maintained; family at bedside. Patient instructed on how to contact the nurse. Patient on 4L NC without distress; patient on regular diet with increasing appetite. No complaints of nausea or pain. PO amoxicillin initiated. PO dexamethasone continued. Questions and concerns have been addressed; will continue to monitor.

## 2018-04-14 NOTE — PLAN OF CARE
Problem: Patient Care Overview  Goal: Plan of Care Review  Outcome: Ongoing (interventions implemented as appropriate)  AAOx4, bed in low and locked position, call bell within reach, voids via bedside commode, no falls. Nasal cannula at 4L continued with O2 SAT WDL. No complaints of nausea or pain. Patient stable, vitals stable, will continue to monitor.

## 2018-04-15 PROBLEM — N39.0 UTI (URINARY TRACT INFECTION): Status: ACTIVE | Noted: 2018-04-15

## 2018-04-15 LAB
ALBUMIN SERPL BCP-MCNC: 2.3 G/DL
ALP SERPL-CCNC: 108 U/L
ALT SERPL W/O P-5'-P-CCNC: 41 U/L
ANION GAP SERPL CALC-SCNC: 8 MMOL/L
AST SERPL-CCNC: 55 U/L
BASOPHILS # BLD AUTO: 0.03 K/UL
BASOPHILS NFR BLD: 0.2 %
BILIRUB SERPL-MCNC: 0.7 MG/DL
BUN SERPL-MCNC: 30 MG/DL
CALCIUM SERPL-MCNC: 8.1 MG/DL
CHLORIDE SERPL-SCNC: 106 MMOL/L
CO2 SERPL-SCNC: 24 MMOL/L
CREAT SERPL-MCNC: 1.6 MG/DL
DIFFERENTIAL METHOD: ABNORMAL
EOSINOPHIL # BLD AUTO: 0 K/UL
EOSINOPHIL NFR BLD: 0 %
ERYTHROCYTE [DISTWIDTH] IN BLOOD BY AUTOMATED COUNT: 19.6 %
EST. GFR  (AFRICAN AMERICAN): 36.6 ML/MIN/1.73 M^2
EST. GFR  (NON AFRICAN AMERICAN): 31.7 ML/MIN/1.73 M^2
GLUCOSE SERPL-MCNC: 149 MG/DL
HCT VFR BLD AUTO: 32.8 %
HGB BLD-MCNC: 10.4 G/DL
IMM GRANULOCYTES # BLD AUTO: 0.28 K/UL
IMM GRANULOCYTES NFR BLD AUTO: 1.6 %
LYMPHOCYTES # BLD AUTO: 0.9 K/UL
LYMPHOCYTES NFR BLD: 4.9 %
MAGNESIUM SERPL-MCNC: 2.4 MG/DL
MCH RBC QN AUTO: 26 PG
MCHC RBC AUTO-ENTMCNC: 31.7 G/DL
MCV RBC AUTO: 82 FL
MONOCYTES # BLD AUTO: 1 K/UL
MONOCYTES NFR BLD: 5.5 %
NEUTROPHILS # BLD AUTO: 15.5 K/UL
NEUTROPHILS NFR BLD: 87.8 %
NRBC BLD-RTO: 0 /100 WBC
PHOSPHATE SERPL-MCNC: 3.1 MG/DL
PLATELET # BLD AUTO: 315 K/UL
PMV BLD AUTO: 9.8 FL
POTASSIUM SERPL-SCNC: 4.2 MMOL/L
PROT SERPL-MCNC: 5.8 G/DL
RBC # BLD AUTO: 4 M/UL
SODIUM SERPL-SCNC: 138 MMOL/L
WBC # BLD AUTO: 17.66 K/UL

## 2018-04-15 PROCEDURE — 94640 AIRWAY INHALATION TREATMENT: CPT

## 2018-04-15 PROCEDURE — 25000003 PHARM REV CODE 250: Performed by: STUDENT IN AN ORGANIZED HEALTH CARE EDUCATION/TRAINING PROGRAM

## 2018-04-15 PROCEDURE — 94760 N-INVAS EAR/PLS OXIMETRY 1: CPT

## 2018-04-15 PROCEDURE — 84100 ASSAY OF PHOSPHORUS: CPT

## 2018-04-15 PROCEDURE — 94799 UNLISTED PULMONARY SVC/PX: CPT

## 2018-04-15 PROCEDURE — 20600001 HC STEP DOWN PRIVATE ROOM

## 2018-04-15 PROCEDURE — 85025 COMPLETE CBC W/AUTO DIFF WBC: CPT

## 2018-04-15 PROCEDURE — 36415 COLL VENOUS BLD VENIPUNCTURE: CPT

## 2018-04-15 PROCEDURE — 97110 THERAPEUTIC EXERCISES: CPT

## 2018-04-15 PROCEDURE — 27100171 HC OXYGEN HIGH FLOW UP TO 24 HOURS

## 2018-04-15 PROCEDURE — 94761 N-INVAS EAR/PLS OXIMETRY MLT: CPT

## 2018-04-15 PROCEDURE — 97530 THERAPEUTIC ACTIVITIES: CPT

## 2018-04-15 PROCEDURE — 25000242 PHARM REV CODE 250 ALT 637 W/ HCPCS: Performed by: STUDENT IN AN ORGANIZED HEALTH CARE EDUCATION/TRAINING PROGRAM

## 2018-04-15 PROCEDURE — 80053 COMPREHEN METABOLIC PANEL: CPT

## 2018-04-15 PROCEDURE — 83735 ASSAY OF MAGNESIUM: CPT

## 2018-04-15 PROCEDURE — 63600175 PHARM REV CODE 636 W HCPCS: Performed by: STUDENT IN AN ORGANIZED HEALTH CARE EDUCATION/TRAINING PROGRAM

## 2018-04-15 PROCEDURE — 99232 SBSQ HOSP IP/OBS MODERATE 35: CPT | Mod: GC,,, | Performed by: INTERNAL MEDICINE

## 2018-04-15 PROCEDURE — 27000221 HC OXYGEN, UP TO 24 HOURS

## 2018-04-15 RX ADMIN — IPRATROPIUM BROMIDE AND ALBUTEROL SULFATE 3 ML: .5; 3 SOLUTION RESPIRATORY (INHALATION) at 12:04

## 2018-04-15 RX ADMIN — APIXABAN 10 MG: 2.5 TABLET, FILM COATED ORAL at 08:04

## 2018-04-15 RX ADMIN — LEVETIRACETAM 750 MG: 750 TABLET ORAL at 08:04

## 2018-04-15 RX ADMIN — POLYETHYLENE GLYCOL 3350 17 G: 17 POWDER, FOR SOLUTION ORAL at 08:04

## 2018-04-15 RX ADMIN — SODIUM BICARBONATE 650 MG TABLET 650 MG: at 08:04

## 2018-04-15 RX ADMIN — PANCRELIPASE 1 CAPSULE: 60000; 12000; 38000 CAPSULE, DELAYED RELEASE PELLETS ORAL at 04:04

## 2018-04-15 RX ADMIN — AMOXICILLIN AND CLAVULANATE POTASSIUM 1 TABLET: 875; 125 TABLET, FILM COATED ORAL at 08:04

## 2018-04-15 RX ADMIN — LORAZEPAM 1 MG: 1 TABLET ORAL at 09:04

## 2018-04-15 RX ADMIN — Medication 10 ML: at 02:04

## 2018-04-15 RX ADMIN — PANTOPRAZOLE SODIUM 40 MG: 40 TABLET, DELAYED RELEASE ORAL at 08:04

## 2018-04-15 RX ADMIN — DEXAMETHASONE 4 MG: 0.5 ELIXIR ORAL at 08:04

## 2018-04-15 RX ADMIN — Medication 10 ML: at 11:04

## 2018-04-15 RX ADMIN — IPRATROPIUM BROMIDE AND ALBUTEROL SULFATE 3 ML: .5; 3 SOLUTION RESPIRATORY (INHALATION) at 11:04

## 2018-04-15 RX ADMIN — AMITRIPTYLINE HYDROCHLORIDE 50 MG: 25 TABLET, FILM COATED ORAL at 08:04

## 2018-04-15 RX ADMIN — PANCRELIPASE 1 CAPSULE: 60000; 12000; 38000 CAPSULE, DELAYED RELEASE PELLETS ORAL at 07:04

## 2018-04-15 RX ADMIN — IPRATROPIUM BROMIDE AND ALBUTEROL SULFATE 3 ML: .5; 3 SOLUTION RESPIRATORY (INHALATION) at 04:04

## 2018-04-15 RX ADMIN — GUAIFENESIN 600 MG: 600 TABLET, EXTENDED RELEASE ORAL at 08:04

## 2018-04-15 RX ADMIN — IPRATROPIUM BROMIDE AND ALBUTEROL SULFATE 3 ML: .5; 3 SOLUTION RESPIRATORY (INHALATION) at 07:04

## 2018-04-15 RX ADMIN — ATORVASTATIN CALCIUM 40 MG: 20 TABLET, FILM COATED ORAL at 08:04

## 2018-04-15 RX ADMIN — PANCRELIPASE 1 CAPSULE: 60000; 12000; 38000 CAPSULE, DELAYED RELEASE PELLETS ORAL at 11:04

## 2018-04-15 RX ADMIN — SODIUM BICARBONATE 650 MG TABLET 650 MG: at 02:04

## 2018-04-15 NOTE — PLAN OF CARE
Problem: Occupational Therapy Goal  Goal: Occupational Therapy Goal  Goals to be met by: 4/24/18     Patient will increase functional independence with ADLs by performing:    UE Dressing with Supervision.  LE Dressing with Supervision.  Grooming while standing with Supervision.  Toileting from toilet with Supervision for hygiene and clothing management.   Toilet transfer to toilet with Supervision.     Outcome: Ongoing (interventions implemented as appropriate)  con't with goals.  Lisa Tubbs, OTR

## 2018-04-15 NOTE — SUBJECTIVE & OBJECTIVE
Interval History: O2 requirements are down to 3 L at rest. She had 3 L UOP since she received lasix. She is on Augmentin    Oncology Treatment Plan:   [No treatment plan]    Medications:  Continuous Infusions:  Scheduled Meds:   albuterol-ipratropium 2.5mg-0.5mg/3mL  3 mL Nebulization Q4H    amitriptyline  50 mg Oral QHS    amoxicillin-clavulanate 875-125mg  1 tablet Oral Q12H    apixaban  10 mg Oral BID    atorvastatin  40 mg Oral Daily    dexamethasone  4 mg Oral Q12H    guaiFENesin  600 mg Oral BID    levETIRAcetam  750 mg Oral BID    lipase-protease-amylase 12,000-38,000-60,000 units  1 capsule Oral TID WM    pantoprazole  40 mg Oral Daily    polyethylene glycol  17 g Oral Daily    sodium bicarbonate  650 mg Oral TID     PRN Meds:sodium chloride, acetaminophen, dextrose 50%, dextrose 50%, glucagon (human recombinant), glucose, glucose, LORazepam, ondansetron, promethazine (PHENERGAN) IVPB, sodium chloride, sodium chloride 0.9%     Review of Systems   Constitutional: Positive for activity change. Negative for appetite change, fatigue and fever.   HENT: Negative for congestion and sinus pain.    Respiratory: Negative for cough and shortness of breath.    Cardiovascular: Negative for chest pain, palpitations and leg swelling.   Gastrointestinal: Negative for abdominal distention, abdominal pain, diarrhea, nausea and vomiting.   Genitourinary: Negative for decreased urine volume and dysuria.   Skin: Negative for rash and wound.     Objective:     Vital Signs (Most Recent):  Temp: 98.5 °F (36.9 °C) (04/15/18 0725)  Pulse: 99 (04/15/18 0736)  Resp: 20 (04/15/18 0736)  BP: 138/72 (04/15/18 0725)  SpO2: (!) 92 % (04/15/18 0736) Vital Signs (24h Range):  Temp:  [97.8 °F (36.6 °C)-98.7 °F (37.1 °C)] 98.5 °F (36.9 °C)  Pulse:  [] 99  Resp:  [16-24] 20  SpO2:  [90 %-99 %] 92 %  BP: (112-138)/(60-85) 138/72     Weight: 68 kg (149 lb 14.6 oz)  Body mass index is 27.42 kg/m².  Body surface area is 1.72 meters  squared.      Intake/Output Summary (Last 24 hours) at 04/15/18 0801  Last data filed at 04/15/18 0200   Gross per 24 hour   Intake              420 ml   Output             3080 ml   Net            -2660 ml       Physical Exam   Constitutional: She is oriented to person, place, and time. She appears well-developed and well-nourished. No distress.   HENT:   Head: Normocephalic and atraumatic.   Eyes: EOM are normal. Right eye exhibits no discharge. Left eye exhibits no discharge.   Neck: Neck supple.   Cardiovascular: Normal rate, regular rhythm, normal heart sounds and intact distal pulses.    No murmur heard.  Pulmonary/Chest: No respiratory distress. She has no wheezes. She has rales (bilatereal basal crackles. (improving)).   On 10 L comfort flow.      Abdominal: Soft. Bowel sounds are normal. She exhibits no distension. There is no tenderness. There is no guarding.   Musculoskeletal: She exhibits no edema.   Neurological: She is alert and oriented to person, place, and time.   She is oriented to person, place and time. She has moments of confusion and memory problem.    Skin: Skin is warm and dry. She is not diaphoretic. There is pallor.   Psychiatric: She has a normal mood and affect.   Vitals reviewed.      Significant Labs:   CBC:     Recent Labs  Lab 04/14/18  0633 04/15/18  0506   WBC 17.49* 17.66*   HGB 9.8* 10.4*   HCT 31.4* 32.8*    315    and CMP:     Recent Labs  Lab 04/14/18  0633 04/15/18  0506    138   K 4.1 4.2    106   CO2 21* 24   * 149*   BUN 32* 30*   CREATININE 1.7* 1.6*   CALCIUM 7.9* 8.1*   PROT 5.7* 5.8*   ALBUMIN 2.2* 2.3*   BILITOT 0.5 0.7   ALKPHOS 100 108   AST 42* 55*   ALT 28 41   ANIONGAP 9 8   EGFRNONAA 29.5* 31.7*       Diagnostic Results:  I have reviewed all pertinent imaging results/findings within the past 24 hours.

## 2018-04-15 NOTE — PLAN OF CARE
Problem: Patient Care Overview  Goal: Plan of Care Review  Outcome: Ongoing (interventions implemented as appropriate)  Patient AAOx4, VSS, afebrile, and without injury. Fall precautions maintained. Family at bedside. Patient instructed on how to contact the nurse. Patient without distress on 3L NC; with good appetite on regular diet. Patient complains of sore mouth; white patchiness in mouth. [place] ordered PRN. No complaints of pain or nausea. PO amoxicillin administered. Continuing discussion about home health or SNF. Questions and concerns have been addressed; will continue to monitor.

## 2018-04-15 NOTE — ASSESSMENT & PLAN NOTE
- not on home O2 but needed 4L NC upon arrival  - acute decompensation overnight 4/10-4/11 and was up to 15L HFNC  - unclear etiology but likely multifactorial and related to CHF and pneumonia on top of her lung cancer with other ddx including aspiration, pneumonitis, TRALI  - given lasix 40mg iv x1 4/11 with good UOP  - Will give her another dose of IV lasix 40 mg on 4/14     Plan:   - continue duonebs scheduled  - continue dex 4q12 PO

## 2018-04-15 NOTE — PT/OT/SLP PROGRESS
Occupational Therapy   Treatment    Name: Naty St  MRN: [id number]  Admitting Diagnosis:  Community acquired pneumonia       Recommendations:     Discharge Recommendations: nursing facility, skilled  Discharge Equipment Recommendations:  none  Barriers to discharge:  None    Subjective     Communicated with: nsg prior to session.  Pain/Comfort:  · Pain Rating 1: 0/10    Patients cultural, spiritual, Confucianism conflicts given the current situation: none    Objective:     Patient found with: telemetry, pulse ox (continuous), oxygen    General Precautions: Standard, fall, aspiration   Orthopedic Precautions:N/A   Braces:       Occupational Performance:O2 at 4L during activity  Bed Mobility:    · Sup to sit with mod I     Functional Mobility/Transfers:  · Sit to stand with min assist  · Ambulated to sofa with HHA/min assist x ~6 ft  · Functional Mobility: sit to stand x 4 reps from sofa with min assist, cues for sit to stand technique each time, rest between each sit to stand; sats decrease to low 80's to 84% with increased mobility activities  · Standing for marches x ~45 sec, O2 decreasing below 88% for last 10 sec, seated afterward to practice breathing exercises and Ox2 sats recover within ~10-15 sec to >88%  · Ambulation with RW back to bed (pt declining further mobility after exercises/standing activity) with CGA    Activities of Daily Living:  · LE dress with setup seated  · Declined grooming tasks and reports she's already performed oral care  · UE dress with robe with setup seated    Patient left HOB elevated with all lines intact and call button in reach    AMPA 6 Click:  AMPA Total Score: 19    Treatment & Education:  Pt performed above activity and performed exercises as follows: BUE shoulder presses x 15 reps with O2 sats dropping to 84%, performed rest/recovery breathing exercises, performed BUE cross body punches with counting/breathing exercises throughout x 2 sets with rest b/w sets and  O2 sats remaining above 91% throughout, performed horizontal abd/add x 15 reps with breathing/counting exercises and O2 sats remain above 91%; performed BLE kicks, marches and ankle pumps- O2 sats remain 90-91%. Educated on breathing techniques with mobility and exercises.  Education:    Assessment:     Naty St is a 72 y.o. female with a medical diagnosis of Community acquired pneumonia.  She presents with good effort with activity, remains deconditioned.  Performance deficits affecting function are weakness, impaired endurance, impaired self care skills, impaired balance, gait instability, impaired functional mobilty, impaired cardiopulmonary response to activity.      Rehab Prognosis:  good; patient would benefit from acute skilled OT services to address these deficits and reach maximum level of function.       Plan:     Patient to be seen 4 x/week to address the above listed problems via self-care/home management, therapeutic activities, therapeutic exercises  · Plan of Care Expires: 05/10/18  · Plan of Care Reviewed with: patient    This Plan of care has been discussed with the patient who was involved in its development and understands and is in agreement with the identified goals and treatment plan    GOALS:    Occupational Therapy Goals        Problem: Occupational Therapy Goal    Goal Priority Disciplines Outcome Interventions   Occupational Therapy Goal     OT, PT/OT Ongoing (interventions implemented as appropriate)    Description:  Goals to be met by: 4/24/18     Patient will increase functional independence with ADLs by performing:    UE Dressing with Supervision.  LE Dressing with Supervision.  Grooming while standing with Supervision.  Toileting from toilet with Supervision for hygiene and clothing management.   Toilet transfer to toilet with Supervision.                      Time Tracking:     OT Date of Treatment: 04/15/18  OT Start Time: 1105  OT Stop Time: 1138  OT Total Time (min):  33 min    Billable Minutes:Therapeutic Activity 15 min  Therapeutic Exercise 18 min    Lisa Tubbs OT  4/15/2018

## 2018-04-15 NOTE — ASSESSMENT & PLAN NOTE
Cultures are positive for enterococcus. sensitive to vancomycin and ampicillin. Patient received vancomycin on admission and currently on Augmentin for CAP. Her symptoms are improving.   - Will continue Augmentin.

## 2018-04-15 NOTE — PLAN OF CARE
Problem: Patient Care Overview  Goal: Plan of Care Review  Outcome: Ongoing (interventions implemented as appropriate)  AAOx4, bed in low and locked position, call bell within reach, voids via beside commode, no falls. Patient requested ativan to sleep; it was administered. 4L NC continued with O2 SAT WDL. No complaints of pain or nausea. Patient stable, vitals stable, will continue to monitor.

## 2018-04-15 NOTE — PROGRESS NOTES
[facility]  Hematology/Oncology  Progress Note    Patient Name: Naty St  Admission Date: 4/9/2018  Hospital Length of Stay: 5 days  Code Status: DNR     Subjective:     HPI:  72 year old female with DVT on Xeralto , Breast CA (1994), HTN, pacreatic CA (1998), meningioma s/p resection,  seizure disorder on Keppra, lung cancer in Feb 2016 with mets to brain and R hip, stroke in 10/17 with no residual deficit, and deppression. She presented to the ED with 2 days of fatigue and subjective fevers.   On the weekend she was feeling increasingly tired and weak. She was ambulating with assistance to the bathroom which is not her baseline. She was more short of breath and she has a dry cough that is worse than usual. She reports orthopnea due to bad coughing spills when she lay flat. She also reports some legs swellings, palpitations, and nausea. Her temp at home was 99.3.   She denies any change in appetite, urinary sx, diarrhea, wounds, rashes, or mouth sores.        Oncologic history from last clinic note:   Ms. Naty St was admitted to the hospital between 01/25/2016 and 01/28/2016. She has a history of pancreatic cancer 17 years ago, breast cancer and meningioma, presented to the hospital complaining of loss of consciousness. The patient apparently was in her normal state of health and she stood up to go to the bathroom and fell to the floor. The patient's daughter helped the mother up in the bathroom and noted that her mother's upper extremities were shaking and eye rolling. No reports of bowel or bladder incontinence, tongue biting or rolling. The second episode lasted about four minutes and she was extremely lethargic following that. Apparently, the patient had a meningioma resection done in the past; however, recently, underwent neurosurgical resection with Dr. Ferrera on 01/12/2016 and pathology from that revealed malignant neoplasm with multiple features pointing towards  "metastatic papillary serous adenocarcinoma.    Additional immunohistochemical stains were performed which revealed the tumor cells to be are positive for TTF1 and negative for ER and GCDFP. The morphology and TTF1 positivity are most consistent with lung primary.    Of note, imaging scan at the end of January 2016 revealed a mass in the lung at 2 cm in the medial aspect of the apical segment of the right upper lobe abutting the mediastinum at the level of the azygous vein and abutting and possibly encasing the segmental bronchi and vessels of the apical segment of the right upper lobe and no pleural fluid was present. Also, there is an enlarged right paratracheal lymph node. No evidence of any metastatic disease at the pancreatic site with postoperative changes post Whipple disease  Her PET Scan from 2/15/16 reveal "Hypermetabolic mass in the right lung apex consistent with a primary malignancy. Hypermetabolic mediastinal lymph nodes consistent with metastatic disease. Right sacral hypermetabolic lesion consistent with metastatic disease, noting additional mildly sclerotic lesions in multiple vertebral bodies which do not demonstrate abnormal hypermetabolism  She underwent IR bone biopsy which revealed metastatic adenocarcinoma of lung origin. 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  She has completed focal RT to brain lesions in March 2016.    PET scan from 6/6/16 shows "Dramatic almost complete response to therapy."  Lovenox started after US lower ext 6/7/16 shows Acute complete occlusion of one of the left posterior tibial vein.Remote partial thrombus of the proximal left superficial femoral vein.  She is on Tarceva.   12/6/16 PET scan reveals "Stable right upper lobe lesion and right hilar lymph node. No new lesions identified. Trace left pleural effusion".  1/27/17 Renal u/s "Medical renal disease. Nonobstructive right nephrolithiasis"  1/31/17 PET - "Right upper lobe nodule and right hilar lymph node similar " "and very low grade activity.  There is no definite evidence of recurrence."   11/9/17 PET "In this patient with history of lung cancer, there is interval increase in size and hypermetabolism of a right upper lobe lung lesion concerning for recurrent disease. Additionally, there is interval appearance of a hypermetabolic paratracheal lymph node suspicious for metastatic disease"   She progressed on Tarceva She underwent EBUS on 12/5/17. Pathology revealed adenocarcinoma but T790m could not be done as quantity was not insufficient. Her PET scan from 1/30/18 revealed The patient's previously identified abnormal lesions is slightly greater uptake of FDG on today's study compared with prior exam. These findings are concerning for progression of disease"    Interval History: O2 requirements are down to 3 L at rest. She had 3 L UOP since she received lasix. She is on Augmentin    Oncology Treatment Plan:   [No treatment plan]    Medications:  Continuous Infusions:  Scheduled Meds:   albuterol-ipratropium 2.5mg-0.5mg/3mL  3 mL Nebulization Q4H    amitriptyline  50 mg Oral QHS    amoxicillin-clavulanate 875-125mg  1 tablet Oral Q12H    apixaban  10 mg Oral BID    atorvastatin  40 mg Oral Daily    dexamethasone  4 mg Oral Q12H    guaiFENesin  600 mg Oral BID    levETIRAcetam  750 mg Oral BID    lipase-protease-amylase 12,000-38,000-60,000 units  1 capsule Oral TID WM    pantoprazole  40 mg Oral Daily    polyethylene glycol  17 g Oral Daily    sodium bicarbonate  650 mg Oral TID     PRN Meds:sodium chloride, acetaminophen, dextrose 50%, dextrose 50%, glucagon (human recombinant), glucose, glucose, LORazepam, ondansetron, promethazine (PHENERGAN) IVPB, sodium chloride, sodium chloride 0.9%     Review of Systems   Constitutional: Positive for activity change. Negative for appetite change, fatigue and fever.   HENT: Negative for congestion and sinus pain.    Respiratory: Negative for cough and shortness of breath.  "   Cardiovascular: Negative for chest pain, palpitations and leg swelling.   Gastrointestinal: Negative for abdominal distention, abdominal pain, diarrhea, nausea and vomiting.   Genitourinary: Negative for decreased urine volume and dysuria.   Skin: Negative for rash and wound.     Objective:     Vital Signs (Most Recent):  Temp: 98.5 °F (36.9 °C) (04/15/18 0725)  Pulse: 99 (04/15/18 0736)  Resp: 20 (04/15/18 0736)  BP: 138/72 (04/15/18 0725)  SpO2: (!) 92 % (04/15/18 0736) Vital Signs (24h Range):  Temp:  [97.8 °F (36.6 °C)-98.7 °F (37.1 °C)] 98.5 °F (36.9 °C)  Pulse:  [] 99  Resp:  [16-24] 20  SpO2:  [90 %-99 %] 92 %  BP: (112-138)/(60-85) 138/72     Weight: 68 kg (149 lb 14.6 oz)  Body mass index is 27.42 kg/m².  Body surface area is 1.72 meters squared.      Intake/Output Summary (Last 24 hours) at 04/15/18 0801  Last data filed at 04/15/18 0200   Gross per 24 hour   Intake              420 ml   Output             3080 ml   Net            -2660 ml       Physical Exam   Constitutional: She is oriented to person, place, and time. She appears well-developed and well-nourished. No distress.   HENT:   Head: Normocephalic and atraumatic.   Eyes: EOM are normal. Right eye exhibits no discharge. Left eye exhibits no discharge.   Neck: Neck supple.   Cardiovascular: Normal rate, regular rhythm, normal heart sounds and intact distal pulses.    No murmur heard.  Pulmonary/Chest: No respiratory distress. She has no wheezes. She has rales (bilatereal basal crackles. (improving)).   On 10 L comfort flow.      Abdominal: Soft. Bowel sounds are normal. She exhibits no distension. There is no tenderness. There is no guarding.   Musculoskeletal: She exhibits no edema.   Neurological: She is alert and oriented to person, place, and time.   She is oriented to person, place and time. She has moments of confusion and memory problem.    Skin: Skin is warm and dry. She is not diaphoretic. There is pallor.   Psychiatric: She has a  normal mood and affect.   Vitals reviewed.      Significant Labs:   CBC:     Recent Labs  Lab 04/14/18  0633 04/15/18  0506   WBC 17.49* 17.66*   HGB 9.8* 10.4*   HCT 31.4* 32.8*    315    and CMP:     Recent Labs  Lab 04/14/18  0633 04/15/18  0506    138   K 4.1 4.2    106   CO2 21* 24   * 149*   BUN 32* 30*   CREATININE 1.7* 1.6*   CALCIUM 7.9* 8.1*   PROT 5.7* 5.8*   ALBUMIN 2.2* 2.3*   BILITOT 0.5 0.7   ALKPHOS 100 108   AST 42* 55*   ALT 28 41   ANIONGAP 9 8   EGFRNONAA 29.5* 31.7*       Diagnostic Results:  I have reviewed all pertinent imaging results/findings within the past 24 hours.    Assessment/Plan:     * Community acquired pneumonia    Patient presenting with worsening non productive cough and SOB. CXR with abnormal infiltration vs congestion. Her WBC is 12.33 K.  She is not on home oxygen. She was on 2-4 L of oxygen in the ED and her sats are in the lower 90s%.   - She received one dose of 2 g ceftriaxone IV  - 4/10 she is on 5 L nc of oxygen.  She spiked a fever of 101.2 overnight  - 4/11: O2 requirements increased to 10 L comfort flow. CXR with worsening edema and small effusion.   - 4/13: she is down to 4 L of O2 this morning. Cefepime switched to PO Augmentin.   - 4/15: she is down to 3 L of O2.     Plan:   - Continue Augmentin.   - continue steroids for suspected TRALI till Monday.  - continue duonebs   - continue to wean her off oxygen as tolerated.           Acute hypoxemic respiratory failure    - not on home O2 but needed 4L NC upon arrival  - acute decompensation overnight 4/10-4/11 and was up to 15L HFNC  - unclear etiology but likely multifactorial and related to CHF and pneumonia on top of her lung cancer with other ddx including aspiration, pneumonitis, TRALI  - given lasix 40mg iv x1 4/11 with good UOP  - Will give her another dose of IV lasix 40 mg on 4/14     Plan:   - continue duonebs scheduled  - continue dex 4q12 PO           Essential hypertension    -  takes norvasc 5mg at home  - hold norvasc given borderline BP  - continue to monitor        Anticoagulant long-term use    - was on Xarelto for DVT at home  - switched to eliquis given borderline renal function, even at baseline (10 mg BID for 7 days then 5 mg BID. It was started on 4/11)        UTI (urinary tract infection)    Cultures are positive for enterococcus. sensitive to vancomycin and ampicillin. Patient received vancomycin on admission and currently on Augmentin for CAP. Her symptoms are improving.   - Will continue Augmentin.         Anemia    Patient was given 1 unit of blood on 4/10 with appropriate response.         Diastolic dysfunction    Patient presented with failure to thrive. There was a concern of new onset Heart failure as she reports DELA CRUZ and orthopnea.   - 2D echo shows diastolic dysfunction and normal EF.   - CXR with worsening pulmonary congestion.   - strict I/Os  - daily weights  - continue to monitor  - PRN lasix           Seizure disorder    - Will continue her home dose of Keppra.         Stage 3 chronic kidney disease    - CKD   - strict I/Os  - avoid nephrotoxic drugs, renally dose meds  - continue to monitor          History of stroke    She had a stroke in October/17.   - Will continue Lipitor 40 daily.         Malignant neoplasm of lower lobe of right lung    We are holding her chemotherapy for now.         History of breast cancer    - See HPI for more details.                  Emily Phillip MD  Hematology/Oncology  [facility]

## 2018-04-16 VITALS
HEIGHT: 62 IN | HEART RATE: 99 BPM | BODY MASS INDEX: 27.59 KG/M2 | TEMPERATURE: 99 F | DIASTOLIC BLOOD PRESSURE: 81 MMHG | WEIGHT: 149.94 LBS | RESPIRATION RATE: 18 BRPM | SYSTOLIC BLOOD PRESSURE: 135 MMHG | OXYGEN SATURATION: 99 %

## 2018-04-16 PROBLEM — J18.9 COMMUNITY ACQUIRED PNEUMONIA: Status: RESOLVED | Noted: 2018-04-09 | Resolved: 2018-04-16

## 2018-04-16 PROBLEM — N39.0 UTI (URINARY TRACT INFECTION): Status: RESOLVED | Noted: 2018-04-15 | Resolved: 2018-04-16

## 2018-04-16 LAB
ALBUMIN SERPL BCP-MCNC: 2 G/DL
ALP SERPL-CCNC: 90 U/L
ALT SERPL W/O P-5'-P-CCNC: 41 U/L
ANION GAP SERPL CALC-SCNC: 9 MMOL/L
AST SERPL-CCNC: 38 U/L
BASOPHILS # BLD AUTO: 0.04 K/UL
BASOPHILS NFR BLD: 0.2 %
BILIRUB SERPL-MCNC: 0.6 MG/DL
BUN SERPL-MCNC: 24 MG/DL
CALCIUM SERPL-MCNC: 7.6 MG/DL
CHLORIDE SERPL-SCNC: 105 MMOL/L
CO2 SERPL-SCNC: 23 MMOL/L
CREAT SERPL-MCNC: 1.3 MG/DL
DIFFERENTIAL METHOD: ABNORMAL
EOSINOPHIL # BLD AUTO: 0 K/UL
EOSINOPHIL NFR BLD: 0 %
ERYTHROCYTE [DISTWIDTH] IN BLOOD BY AUTOMATED COUNT: 19.9 %
EST. GFR  (AFRICAN AMERICAN): 47 ML/MIN/1.73 M^2
EST. GFR  (NON AFRICAN AMERICAN): 40.8 ML/MIN/1.73 M^2
GLUCOSE SERPL-MCNC: 137 MG/DL
HCT VFR BLD AUTO: 31.6 %
HGB BLD-MCNC: 9.9 G/DL
IMM GRANULOCYTES # BLD AUTO: 0.49 K/UL
IMM GRANULOCYTES NFR BLD AUTO: 2.7 %
LYMPHOCYTES # BLD AUTO: 0.9 K/UL
LYMPHOCYTES NFR BLD: 5.1 %
MAGNESIUM SERPL-MCNC: 2.4 MG/DL
MCH RBC QN AUTO: 26.3 PG
MCHC RBC AUTO-ENTMCNC: 31.3 G/DL
MCV RBC AUTO: 84 FL
MONOCYTES # BLD AUTO: 1 K/UL
MONOCYTES NFR BLD: 5.2 %
NEUTROPHILS # BLD AUTO: 16 K/UL
NEUTROPHILS NFR BLD: 86.8 %
NRBC BLD-RTO: 0 /100 WBC
PHOSPHATE SERPL-MCNC: 2.4 MG/DL
PLATELET # BLD AUTO: 283 K/UL
PMV BLD AUTO: 10.3 FL
POTASSIUM SERPL-SCNC: 5.1 MMOL/L
PROT SERPL-MCNC: 5.2 G/DL
RBC # BLD AUTO: 3.76 M/UL
SODIUM SERPL-SCNC: 137 MMOL/L
WBC # BLD AUTO: 18.43 K/UL

## 2018-04-16 PROCEDURE — 97116 GAIT TRAINING THERAPY: CPT

## 2018-04-16 PROCEDURE — 94640 AIRWAY INHALATION TREATMENT: CPT

## 2018-04-16 PROCEDURE — 25000242 PHARM REV CODE 250 ALT 637 W/ HCPCS: Performed by: STUDENT IN AN ORGANIZED HEALTH CARE EDUCATION/TRAINING PROGRAM

## 2018-04-16 PROCEDURE — G8979 MOBILITY GOAL STATUS: HCPCS | Mod: CJ

## 2018-04-16 PROCEDURE — 97161 PT EVAL LOW COMPLEX 20 MIN: CPT

## 2018-04-16 PROCEDURE — 27000221 HC OXYGEN, UP TO 24 HOURS

## 2018-04-16 PROCEDURE — G8980 MOBILITY D/C STATUS: HCPCS | Mod: CJ

## 2018-04-16 PROCEDURE — 94760 N-INVAS EAR/PLS OXIMETRY 1: CPT

## 2018-04-16 PROCEDURE — 25000003 PHARM REV CODE 250: Performed by: STUDENT IN AN ORGANIZED HEALTH CARE EDUCATION/TRAINING PROGRAM

## 2018-04-16 PROCEDURE — 80053 COMPREHEN METABOLIC PANEL: CPT

## 2018-04-16 PROCEDURE — 94761 N-INVAS EAR/PLS OXIMETRY MLT: CPT

## 2018-04-16 PROCEDURE — 83735 ASSAY OF MAGNESIUM: CPT

## 2018-04-16 PROCEDURE — 63600175 PHARM REV CODE 636 W HCPCS: Performed by: STUDENT IN AN ORGANIZED HEALTH CARE EDUCATION/TRAINING PROGRAM

## 2018-04-16 PROCEDURE — 36415 COLL VENOUS BLD VENIPUNCTURE: CPT

## 2018-04-16 PROCEDURE — 99233 SBSQ HOSP IP/OBS HIGH 50: CPT | Mod: GC,,, | Performed by: INTERNAL MEDICINE

## 2018-04-16 PROCEDURE — 84100 ASSAY OF PHOSPHORUS: CPT

## 2018-04-16 PROCEDURE — 85025 COMPLETE CBC W/AUTO DIFF WBC: CPT

## 2018-04-16 PROCEDURE — G8978 MOBILITY CURRENT STATUS: HCPCS | Mod: CJ

## 2018-04-16 RX ORDER — METOPROLOL SUCCINATE 50 MG/1
50 TABLET, EXTENDED RELEASE ORAL DAILY
Qty: 30 TABLET | Refills: 11 | Status: SHIPPED | OUTPATIENT
Start: 2018-04-16 | End: 2018-07-05 | Stop reason: SDUPTHER

## 2018-04-16 RX ORDER — AMLODIPINE BESYLATE 5 MG/1
5 TABLET ORAL DAILY
Qty: 30 TABLET | Refills: 11 | Status: SHIPPED | OUTPATIENT
Start: 2018-04-16 | End: 2018-05-24 | Stop reason: ALTCHOICE

## 2018-04-16 RX ADMIN — DEXAMETHASONE 4 MG: 0.5 ELIXIR ORAL at 08:04

## 2018-04-16 RX ADMIN — AMOXICILLIN AND CLAVULANATE POTASSIUM 1 TABLET: 875; 125 TABLET, FILM COATED ORAL at 08:04

## 2018-04-16 RX ADMIN — PANCRELIPASE 1 CAPSULE: 60000; 12000; 38000 CAPSULE, DELAYED RELEASE PELLETS ORAL at 01:04

## 2018-04-16 RX ADMIN — SODIUM BICARBONATE 650 MG TABLET 650 MG: at 04:04

## 2018-04-16 RX ADMIN — GUAIFENESIN 600 MG: 600 TABLET, EXTENDED RELEASE ORAL at 08:04

## 2018-04-16 RX ADMIN — PANTOPRAZOLE SODIUM 40 MG: 40 TABLET, DELAYED RELEASE ORAL at 08:04

## 2018-04-16 RX ADMIN — LEVETIRACETAM 750 MG: 750 TABLET ORAL at 08:04

## 2018-04-16 RX ADMIN — ATORVASTATIN CALCIUM 40 MG: 20 TABLET, FILM COATED ORAL at 08:04

## 2018-04-16 RX ADMIN — SODIUM BICARBONATE 650 MG TABLET 650 MG: at 08:04

## 2018-04-16 RX ADMIN — PANCRELIPASE 1 CAPSULE: 60000; 12000; 38000 CAPSULE, DELAYED RELEASE PELLETS ORAL at 04:04

## 2018-04-16 RX ADMIN — APIXABAN 10 MG: 2.5 TABLET, FILM COATED ORAL at 08:04

## 2018-04-16 RX ADMIN — Medication 10 ML: at 08:04

## 2018-04-16 RX ADMIN — IPRATROPIUM BROMIDE AND ALBUTEROL SULFATE 3 ML: .5; 3 SOLUTION RESPIRATORY (INHALATION) at 04:04

## 2018-04-16 RX ADMIN — IPRATROPIUM BROMIDE AND ALBUTEROL SULFATE 3 ML: .5; 3 SOLUTION RESPIRATORY (INHALATION) at 07:04

## 2018-04-16 RX ADMIN — PANCRELIPASE 1 CAPSULE: 60000; 12000; 38000 CAPSULE, DELAYED RELEASE PELLETS ORAL at 08:04

## 2018-04-16 NOTE — ASSESSMENT & PLAN NOTE
Patient presenting with worsening non productive cough and SOB. CXR with abnormal infiltration vs congestion. Her WBC is 12.33 K.  She is not on home oxygen. She was on 2-4 L of oxygen in the ED and her sats are in the lower 90s%.   - She received one dose of 2 g ceftriaxone IV  - 4/10 she is on 5 L nc of oxygen.  She spiked a fever of 101.2 overnight  - 4/11: O2 requirements increased to 10 L comfort flow. CXR with worsening edema and small effusion.   - 4/13: she is down to 4 L of O2 this morning. Cefepime switched to PO Augmentin.   - 4/15: she is down to 3 L of O2.   - She completed 7 days of abx.

## 2018-04-16 NOTE — PT/OT/SLP PROGRESS
"Physical Therapy Treatment    Patient Name:  Naty St   MRN:  [id number]    Recommendations:     Discharge Recommendations:  home health PT   Discharge Equipment Recommendations: walker, rolling, oxygen   Barriers to discharge: None    Assessment:     Naty St is a 72 y.o. female admitted with a medical diagnosis of Community acquired pneumonia.  She presents with the following impairments/functional limitations:  weakness, impaired endurance, impaired cardiopulmonary response to activity, gait instability, impaired balance, impaired functional mobilty.  Pt is to be discharged at this time with a RW, supp O2 and skilled PT services in the home setting, as pt is medically stable.  Pt requires increased A from family sec to decreased stability with gait and post LOB.      Recent Surgery: * No surgery found *      Plan:     During this hospitalization, patient to be seen 4 x/week to address the above listed problems via gait training, therapeutic activities, therapeutic exercises, neuromuscular re-education  · Plan of Care Expires:  05/09/18   Plan of Care Reviewed with: patient, spouse, son    Subjective     Communicated with nursing  prior to session.  Patient found in supine upon PT entry to room, agreeable to treatment.      "Ok, they want to see if I need O2."    Chief Complaint: DELA CRUZ  Patient comments/goals: To go home  Pain/Comfort:  · Pain Rating 1: 0/10    Patients cultural, spiritual, Caodaism conflicts given the current situation: no    Objective:     Patient found with: telemetry, pulse ox (continuous), oxygen, peripheral IV     General Precautions: Standard, aspiration, fall   Orthopedic Precautions:N/A   Braces: N/A     Functional Mobility:  · Bed Mobility:     · Scooting: independence  · Supine to Sit: independence  · Transfers:     · Sit to Stand:  supervision with no AD  · Bed to Chair: contact guard assistance with  rolling walker  using  Stand Pivot  · Gait: Pt amb 58', " 58', with sitting rest break bn trials, CGA, RW, with episodes of post LOB, ed on stepping placement, decreased gait speed  · Balance: CGA: dynamic standing balance with AD  · Stairs:  Pt ascended/descended 8 stair(s) with No Assistive Device with left handrail with Contact Guard Assistance and Minimal Assistance.       AM-PAC 6 CLICK MOBILITY  Turning over in bed (including adjusting bedclothes, sheets and blankets)?: 4  Sitting down on and standing up from a chair with arms (e.g., wheelchair, bedside commode, etc.): 3  Moving from lying on back to sitting on the side of the bed?: 4  Moving to and from a bed to a chair (including a wheelchair)?: 3  Need to walk in hospital room?: 3  Climbing 3-5 steps with a railing?: 3  Total Score: 20       Therapeutic Activities and Exercises:   Whiteboard updated  Ed on SaO2  Ed to family on providing assistance and hand placement    Patient left up in chair with all lines intact, call button in reach, resident, SW, nursing notified and family present.    GOALS:    Physical Therapy Goals        Problem: Physical Therapy Goal    Goal Priority Disciplines Outcome Goal Variances Interventions   Physical Therapy Goal     PT/OT, PT Unable to achieve outcome(s) by discharge     Description:  Goals to be met by: 18     Patient will increase functional independence with mobility by performin. Supine to sit with Modified [place]. - GOAL MET  2. Sit to stand transfer with Stand-by Assistance. - GOAL MET  3. Bed to chair transfer with Stand-by Assistance using LRAD as needed. - GOAL MET  4. Gait  x 150 feet with Contact Guard Assistance using LRAD as needed.   5. Ascend/descend 8 stair with bilateral Handrails Contact Guard Assistance.   6. Lower extremity exercise program x 15 reps, with supervision, in order to increase LE strength and (I) with functional mobility.                        Time Tracking:     PT Received On: 18  PT Start Time: 1352     PT Stop Time:  1427  PT Total Time (min): 35 min     Billable Minutes: Gait Training 30    Treatment Type: Treatment  PT/PTA: PT           Franchesca Lee, PT  04/16/2018

## 2018-04-16 NOTE — DISCHARGE INSTRUCTIONS
:  [facility], [phone], to provide nursing, physical and occupational therapy services. Services to begin with nurse admission assessment visit on the day after discharge from the hospital.  [facility], [phone], to provide oxygen concentrator and portable oxygen, delivery to patient on 4/16/18 for discharge from the hospital. Requested that technician assess patient's commode frame and tub/shower bench at home.  Jossy Griggs, MSW, [facility]  [phone]

## 2018-04-16 NOTE — PROGRESS NOTES
Informed per Dr. Najera's Medical Oncology team that patient will be d/c'ed home today with home health services and DME. Patient declined to go to SNF and instead wants to go home. Spoke with Dr Almazan, Oncology Fellow, and the physical therapist Franchesca, who is assigned to patient today, and patient's nurse Bia. Patient did well walking and practicing steps today. She did qualify for home oxygen and MDs ordered this for her. A commode chair and rolling walker were also ordered. Per Emperatriz with [facility], patient has received a walker with wheels, commode chair, and wheelchair through her insurance within the past couple of years, and isn't eligible for insurance to cover again. Received call from Dontae with [facility] and she confirmed that the oxygen order has been processed; informed her that patient needs the portable tank delivered to her hospital room as soon as possible for d/c from the hospital today. Asked that the technician who delivers and sets up the concentrator at her house also check her commode frame, tub/shower bench, and walker. Gave referral for home health to [facility] chandrika Nam at ext. [phone]; also asked her to pass on to the home health PT/OT staff to assess patient's DME at home. Home health orders include skilled nursing care, and physical and occupational therapies. Services will begin with the nurse admission assessment visit on the day after discharge from the hospital. Met with patient and her  in patient's room this afternoon and discussed d/c arrangements. They are in agreement with d/c plans and arrangements for today. No other d/c needs indicated at this time.

## 2018-04-16 NOTE — DISCHARGE SUMMARY
[facility]  Hematology/Oncology  Discharge Summary      Patient Name: Naty St  MRN: [id number]  Admission Date: 4/9/2018  Hospital Length of Stay: 6 days  Discharge Date and Time:  04/16/2018 3:06 PM  Attending Physician: Yahaira Najera MD   Discharging Provider: Emily Phillip MD  Primary Care Provider: Olvin Mars MD    HPI: 72 year old female with DVT on Xeralto , Breast CA (1994), HTN, pacreatic CA (1998), meningioma s/p resection,  seizure disorder on Keppra, lung cancer in Feb 2016 with mets to brain and R hip, stroke in 10/17 with no residual deficit, and deppression. She presented to the ED with 2 days of fatigue and subjective fevers.   On the weekend she was feeling increasingly tired and weak. She was ambulating with assistance to the bathroom which is not her baseline. She was more short of breath and she has a dry cough that is worse than usual. She reports orthopnea due to bad coughing spills when she lay flat. She also reports some legs swellings, palpitations, and nausea. Her temp at home was 99.3.   She denies any change in appetite, urinary sx, diarrhea, wounds, rashes, or mouth sores.        Oncologic history from last clinic note:   Ms. Naty St was admitted to the hospital between 01/25/2016 and 01/28/2016. She has a history of pancreatic cancer 17 years ago, breast cancer and meningioma, presented to the hospital complaining of loss of consciousness. The patient apparently was in her normal state of health and she stood up to go to the bathroom and fell to the floor. The patient's daughter helped the mother up in the bathroom and noted that her mother's upper extremities were shaking and eye rolling. No reports of bowel or bladder incontinence, tongue biting or rolling. The second episode lasted about four minutes and she was extremely lethargic following that. Apparently, the patient had a meningioma resection done in the past; however, recently,  "underwent neurosurgical resection with Dr. 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    Additional immunohistochemical stains were performed which revealed the tumor cells to be are positive for TTF1 and negative for ER and GCDFP. The morphology and TTF1 positivity are most consistent with lung primary.    Of note, imaging scan at the end of January 2016 revealed a mass in the lung at 2 cm in the medial aspect of the apical segment of the right upper lobe abutting the mediastinum at the level of the azygous vein and abutting and possibly encasing the segmental bronchi and vessels of the apical segment of the right upper lobe and no pleural fluid was present. Also, there is an enlarged right paratracheal lymph node. No evidence of any metastatic disease at the pancreatic site with postoperative changes post Whipple disease  Her PET Scan from 2/15/16 reveal "Hypermetabolic mass in the right lung apex consistent with a primary malignancy. Hypermetabolic mediastinal lymph nodes consistent with metastatic disease. Right sacral hypermetabolic lesion consistent with metastatic disease, noting additional mildly sclerotic lesions in multiple vertebral bodies which do not demonstrate abnormal hypermetabolism  She underwent IR bone biopsy which revealed metastatic adenocarcinoma of lung origin. 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  She has completed focal RT to brain lesions in March 2016.    PET scan from 6/6/16 shows "Dramatic almost complete response to therapy."  Lovenox started after US lower ext 6/7/16 shows Acute complete occlusion of one of the left posterior tibial vein.Remote partial thrombus of the proximal left superficial femoral vein.  She is on Tarceva.   12/6/16 PET scan reveals "Stable right upper lobe lesion and right hilar lymph node. No new lesions identified. Trace left pleural " "effusion".  1/27/17 Renal u/s "Medical renal disease. Nonobstructive right nephrolithiasis"  1/31/17 PET - "Right upper lobe nodule and right hilar lymph node similar and very low grade activity.  There is no definite evidence of recurrence."   11/9/17 PET "In this patient with history of lung cancer, there is interval increase in size and hypermetabolism of a right upper lobe lung lesion concerning for recurrent disease. Additionally, there is interval appearance of a hypermetabolic paratracheal lymph node suspicious for metastatic disease"   She progressed on Tarceva She underwent EBUS on 12/5/17. Pathology revealed adenocarcinoma but T790m could not be done as quantity was not insufficient. Her PET scan from 1/30/18 revealed The patient's previously identified abnormal lesions is slightly greater uptake of FDG on today's study compared with prior exam. These findings are concerning for progression of disease"    * No surgery found *     Hospital Course: Patient was admitted to hemonc service on 4/9 with acute hypoxic respiratory failure. She was requiring 4 L of nc on admission. She was started on moxi for CAP. She received one dose of ceftriaxone in the ED. On 2nd day of admission she spiked a fever. Her Hb was ~7 g/dl so she was transfused 1 unit of PRBCs. Over night she developed worsening of her symptoms with increased O2 requirements to 15 L HFNC. Her CXR showed worsening congestion. There was a concern of TRALI vs TACO. New 2D echo with diastolic dysfunction. She was given IV lasix pushes as needed and was started on dexamethasone.  Her abx was escalated to vanc and cefepime. She improved with diuresis and steroids. Pulmonary were consulted. Her O2 requirements continued to improve. On 4/15 she was switched to Augmentin. PT recommended discharging her to SNF but the patient refused and she wanted to go home with home health. She qualified for home oxygen so she was discharged on 3 L nc while at rest and 6 " while active. Augmentin and dexamethasone were discontinued on discharge. Her BP medicine are held during this admission so it will be held till her next appointment with Dr. Vazquez.     Consults:   Consults         Status Ordering Provider     Inpatient consult to Hematology/Oncology  Once     Provider:  (Not yet assigned)    Completed MELINDA SCOTT     Inpatient consult to Oncology Social Work  Once     Provider:  (Not yet assigned)    Acknowledged JOSUE ABREU     Inpatient consult to Pulmonology  Once     Provider:  (Not yet assigned)    Completed MUNDO HUNTER     Inpatient consult to [facility]  Once     Provider:  (Not yet assigned)    Acknowledged MUNDO HUNTER          Significant Diagnostic Studies: Radiology: X-Ray: CXR: X-Ray Chest 1 View (CXR):   Results for orders placed or performed during the hospital encounter of 04/09/18   X-Ray Chest 1 View    Narrative    EXAMINATION:  XR CHEST 1 VIEW    CLINICAL HISTORY:  fever, hypoxia;    COMPARISON:  Comparison is made to the most recent prior chest radiograph, dated 04/10/2018 at 10:23 a.m. p.m..    FINDINGS:  Significant interval clearing of previously documented bilateral airspace consolidation since the examination referenced above is appreciated, with no significant detrimental interval change in the appearance of the chest since that time observed.      Impression    As above      Electronically signed by: Troy Larkin MD  Date:    04/13/2018  Time:    07:08       Pending Diagnostic Studies:     None        Final Active Diagnoses:    Diagnosis Date Noted POA    Acute hypoxemic respiratory failure [J96.01] 04/12/2018 No    Essential hypertension [I10] 08/25/2014 Yes    Anticoagulant long-term use [Z79.01] 11/27/2017 Not Applicable    Anemia [D64.9] 04/11/2018 Yes    Diastolic dysfunction [I51.9] 04/10/2018 Yes    Seizure disorder [G40.909] 04/09/2018 No    Stage 3 chronic kidney disease [N18.3] 04/12/2018 Yes    History of stroke  "[Z86.73] 04/09/2018 Not Applicable    Malignant neoplasm of lower lobe of right lung [C34.31] 03/17/2016 Yes    History of breast cancer [Z85.3] 07/31/2015 Not Applicable      Problems Resolved During this Admission:    Diagnosis Date Noted Date Resolved POA    PRINCIPAL PROBLEM:  Community acquired pneumonia [J18.9] 04/09/2018 04/16/2018 Yes    UTI (urinary tract infection) [N39.0] 04/15/2018 04/16/2018 Yes      Discharged Condition: fair    Disposition: Home-Health Care Svc    Follow Up:  Follow-up Information     [facility] On 4/20/2018.    Specialty:  Lab  Why:  Labs- 7:40am  Contact information:  [facility] [facility] [address] [phone]  Additional information:  [facility] 3rd Floor           Karrie Vazquez MD On 4/20/2018.    Specialties:  Hematology and Oncology, Hematology  Why:  follow up- 8:45am  Contact information:  [address]  [place] [id number] [phone]                 Patient Instructions:     COMMODE FOR HOME USE   Order Specific Question Answer Comments   Type: Standard    Height: 5' 2" (1.575 m)    Weight: 68 kg (149 lb 14.6 oz)    Does patient have medical equipment at home? bath bench    Does patient have medical equipment at home? wheelchair    Does patient have medical equipment at home? walker, standard    Does patient have medical equipment at home? raised toilet    Does patient have medical equipment at home? cane, straight    Length of need (1-99 months): 99    Vendor: Other (use comments) Pt received 01/17/2016, not eligible   Expected Date of Delivery: 4/16/2018      WALKER FOR HOME USE   Order Specific Question Answer Comments   Type of Walker: Adult (5'4"-6'6")    With wheels? Yes    Height: 5' 2" (1.575 m)    Weight: 68 kg (149 lb 14.6 oz)    Length of need (1-99 months): 99    Platform attachment: Bilateral    Does patient have medical equipment at home? bath bench    Does patient have medical equipment at home? " "wheelchair    Does patient have medical equipment at home? walker, standard    Does patient have medical equipment at home? raised toilet    Does patient have medical equipment at home? cane, straight    Please check all that apply: Patient's condition impairs ambulation.    Please check all that apply: Patient is unable to safely ambulate without equipment.    Please check all that apply: Patient needs help to get in and out of chair.    Vendor: Other (use comments) Not eligible, has walker and wc.   Expected Date of Delivery: 4/16/2018      OXYGEN FOR HOME USE   Order Specific Question Answer Comments   Liter Flow 3    Duration Continuous    Qualifying SpO2: 83% on RA    Testing done at: Rest    Route nasal cannula    Device home concentrator with portable unit    Length of need (in months): 99 mos    Patient condition with qualifying saturation other chronic pulmonary condition acute hypoxic resp failure and diastolic dysfunction    Height: 5' 2" (1.575 m)    Weight: 68 kg (149 lb 14.6 oz)    Alternative treatment measures have been tried or considered and deemed clinically ineffective. Yes      Ambulatory referral to Outpatient Case Management   Referral Priority: Routine Referral Type: Consultation   Referral Reason: Specialty Services Required    Number of Visits Requested: 1      Activity as tolerated       Medications:  Reconciled Home Medications:      Medication List      START taking these medications    * apixaban 5 mg Tab  Take 2 tablets (10 mg total) by mouth 2 (two) times daily.     * apixaban 5 mg Tab  Take 1 tablet (5 mg total) by mouth 2 (two) times daily.  Start taking on:  4/18/2018     DUKE'S SOLUTION  Take 10 mLs by mouth 4 (four) times daily as needed (mouth sores/pain).        * This list has 2 medication(s) that are the same as other medications prescribed for you. Read the directions carefully, and ask your doctor or other care provider to review them with you.            CHANGE how you " take these medications    amLODIPine 5 MG tablet  Commonly known as:  NORVASC  Take 1 tablet (5 mg total) by mouth once daily. Hold until you follow up with your PCP as your BP has been on the lower side.  What changed:  additional instructions     metoprolol succinate 50 MG 24 hr tablet  Commonly known as:  TOPROL-XL  Take 1 tablet (50 mg total) by mouth once daily. Hold until your appointment with your PCP. HR and BP has been normal off this medications.  What changed:  additional instructions     osimertinib 80 mg Tab  Commonly known as:  TAGRISSO  Take 1 tablet by mouth once daily. Do not resume until Dr. Vazquez tells you to.  What changed:  additional instructions        CONTINUE taking these medications    amitriptyline 50 MG tablet  Commonly known as:  ELAVIL  Take 1 tablet (50 mg total) by mouth every evening.     atorvastatin 40 MG tablet  Commonly known as:  LIPITOR  Take 1 tablet (40 mg total) by mouth once daily.     clindamycin phosphate 1% 1 % gel  Commonly known as:  CLINDAGEL  Apply topically 2 (two) times daily.     CREON Cpdr  Generic drug:  lipase-protease-amylase 12,000-38,000-60,000 units  TAKE ONE CAPSULE BY MOUTH THREE TIMES A DAY WITH MEALS     denosumab 120 mg/1.7 mL (70 mg/mL) Soln  Commonly known as:  XGEVA  Inject 120 mg into the skin every 28 days.     dronabinol 5 MG capsule  Commonly known as:  MARINOL  Take 1 capsule (5 mg total) by mouth 2 (two) times daily before meals.     ergocalciferol 50,000 unit Cap  Commonly known as:  ERGOCALCIFEROL  Take 1 capsule (50,000 Units total) by mouth every 7 days. Take weekly for 8 weeks and once thereafter     ferrous sulfate 325 mg (65 mg iron) Tab tablet  Take 1 tablet (325 mg total) by mouth 2 (two) times daily.     levETIRAcetam 750 MG Tab  Commonly known as:  KEPPRA  Take 1 tablet (750 mg total) by mouth 2 (two) times daily.     LORazepam 1 MG tablet  Commonly known as:  ATIVAN  TAKE ONE TABLET BY MOUTH TWICE DAILY     meclizine 12.5 mg  tablet  Commonly known as:  ANTIVERT  Take 1-2 tablets (12.5-25 mg total) by mouth 3 (three) times daily as needed for Dizziness.     mirabegron 50 mg Tb24  Take 1 tablet (50 mg total) by mouth once daily.     multivitamin per tablet  Commonly known as:  THERAGRAN  Take 1 tablet by mouth once daily.     sodium bicarbonate 650 MG tablet  Take 1 tablet (650 mg total) by mouth 3 (three) times daily. Increase dose to 2 60 mg tabs by mouth three times daily.     traMADol 50 mg tablet  Commonly known as:  ULTRAM  Take 1 tablet (50 mg total) by mouth every 6 (six) hours as needed for Pain.        STOP taking these medications    calcium citrate 250 mg calcium Tab     doxycycline 100 MG Cap  Commonly known as:  VIBRAMYCIN     rivaroxaban 20 mg Tab  Commonly known as:  MEKHI Phillip MD  Hematology/Oncology  [facility]

## 2018-04-16 NOTE — PLAN OF CARE
MDR's with Dr Najera.  The patient and her family would like to d/c home HH and declined the recommendation of SNF.  A RW and BSC has been ordered.  D/c pending approval and delivery of home O2.  Patient is currently requiring 3L NC.  SW arranging HH and DME needs.  CM sent a message to the oncology clinic to schedule f/u.  The patient and her family agree with the d/c plan.  Will continue to follow.      Future Appointments  Date Time Provider Department Center   4/20/2018 7:40 AM LAB, HEMONC CANCER BLDG [facility] LAB HO [facility]   4/20/2018 8:45 AM Karrie Vazquez MD [facility] HEM ONC [facility]   5/18/2018 10:30 AM LAB, APPOINTMENT [facility] LAB VNP [facility]   5/18/2018 10:35 AM SPECIMEN, [facility] SPECLAB [facility]   5/24/2018 11:30 AM Mihaela Bazzi MD [facility] NEPHRO [facility]          04/16/18 4845   Final Note   Assessment Type Final Discharge Note   Discharge Disposition Home-Health  (refused SNF)   What phone number can be called within the next 1-3 days to see how you are doing after discharge? ([phone])   Hospital Follow Up  Appt(s) scheduled? Yes   Discharge plans and expectations educations in teach back method with documentation complete? Yes

## 2018-04-16 NOTE — PLAN OF CARE
Problem: Patient Care Overview  Goal: Plan of Care Review  Outcome: Ongoing (interventions implemented as appropriate)  Pt involved in plan of care and communicating needs throughout shift.  Family at bedside and involved in care.  Pt up in room with SBA; mild generalized weakness noted; pt independent from bed to chair/BSC.  No c/o pain or discomfort today.  Tolerating regular diet, voiding without difficulty; had BM today. All VSS; O2 sats 95% on 3L NC; no acute events so far this shift.  Pt remaining free from falls or injury throughout shift; bed in lowest position; call light within reach.  Pt instructed to call for assistance as needed.  Q1H rounding done on pt.  Plan for d/c this afternoon once home health and home O2 set up.

## 2018-04-16 NOTE — ASSESSMENT & PLAN NOTE
- not on home O2 but needed 4L NC upon arrival  - acute decompensation overnight 4/10-4/11 and was up to 15L HFNC  - unclear etiology but likely multifactorial and related to CHF and pneumonia on top of her lung cancer with other ddx including aspiration, pneumonitis, TRALI  - given lasix 40mg iv x1 4/11 with good UOP  - Will give her another dose of IV lasix 40 mg on 4/14   - Will d/c steroids and Augmentin on discharge.   - Will assess her need for oxygen at home.

## 2018-04-16 NOTE — SUBJECTIVE & OBJECTIVE
Interval History: her O2 sats are in the lower 90s off oxygen while she is resting. Will do 6 min walk test.    Oncology Treatment Plan:   [No treatment plan]    Medications:  Continuous Infusions:  Scheduled Meds:   albuterol-ipratropium 2.5mg-0.5mg/3mL  3 mL Nebulization Q4H    amitriptyline  50 mg Oral QHS    amoxicillin-clavulanate 875-125mg  1 tablet Oral Q12H    apixaban  10 mg Oral BID    atorvastatin  40 mg Oral Daily    dexamethasone  4 mg Oral Q12H    guaiFENesin  600 mg Oral BID    levETIRAcetam  750 mg Oral BID    lipase-protease-amylase 12,000-38,000-60,000 units  1 capsule Oral TID WM    pantoprazole  40 mg Oral Daily    polyethylene glycol  17 g Oral Daily    sodium bicarbonate  650 mg Oral TID     PRN Meds:sodium chloride, acetaminophen, dextrose 50%, dextrose 50%, duke's soln (benadryl 30 mL, mylanta 30 mL, lidocane 30 mL, nystatin 30 mL) 120 mL, glucagon (human recombinant), glucose, glucose, LORazepam, ondansetron, promethazine (PHENERGAN) IVPB, sodium chloride, sodium chloride 0.9%     Review of Systems   Constitutional: Negative for activity change, appetite change, fatigue and fever.   HENT: Negative for congestion and sinus pain.    Respiratory: Negative for cough and shortness of breath.    Cardiovascular: Negative for chest pain, palpitations and leg swelling.   Gastrointestinal: Negative for abdominal distention, abdominal pain, diarrhea, nausea and vomiting.   Genitourinary: Negative for decreased urine volume and dysuria.   Skin: Negative for rash and wound.     Objective:     Vital Signs (Most Recent):  Temp: 96.8 °F (36 °C) (04/16/18 0805)  Pulse: 103 (04/16/18 0805)  Resp: 19 (04/16/18 0805)  BP: (!) 112/57 (04/16/18 0805)  SpO2: 97 % (04/16/18 0805) Vital Signs (24h Range):  Temp:  [96.8 °F (36 °C)-98.5 °F (36.9 °C)] 96.8 °F (36 °C)  Pulse:  [] 103  Resp:  [16-20] 19  SpO2:  [89 %-100 %] 97 %  BP: (112-139)/(57-75) 112/57     Weight: 68 kg (149 lb 14.6 oz)  Body mass  index is 27.42 kg/m².  Body surface area is 1.72 meters squared.      Intake/Output Summary (Last 24 hours) at 04/16/18 1115  Last data filed at 04/16/18 0900   Gross per 24 hour   Intake              720 ml   Output             1750 ml   Net            -1030 ml       Physical Exam   Constitutional: She is oriented to person, place, and time. She appears well-developed and well-nourished. No distress.   HENT:   Head: Normocephalic and atraumatic.   Eyes: EOM are normal. Right eye exhibits no discharge. Left eye exhibits no discharge.   Neck: Neck supple.   Cardiovascular: Normal rate, regular rhythm, normal heart sounds and intact distal pulses.    No murmur heard.  Pulmonary/Chest: No respiratory distress. She has no wheezes. She has no rales.   On 2-3 L      Abdominal: Soft. Bowel sounds are normal. She exhibits no distension. There is no tenderness. There is no guarding.   Musculoskeletal: She exhibits no edema.   Neurological: She is alert and oriented to person, place, and time.   She is oriented to person, place and time. She has moments of confusion and memory problem.    Skin: Skin is warm and dry. She is not diaphoretic. There is pallor.   Psychiatric: She has a normal mood and affect.   Vitals reviewed.      Significant Labs:   CBC:   Recent Labs  Lab 04/15/18  0506 04/16/18  0611   WBC 17.66* 18.43*   HGB 10.4* 9.9*   HCT 32.8* 31.6*    283    and CMP:   Recent Labs  Lab 04/15/18  0506 04/16/18  0611    137   K 4.2 5.1    105   CO2 24 23   * 137*   BUN 30* 24*   CREATININE 1.6* 1.3   CALCIUM 8.1* 7.6*   PROT 5.8* 5.2*   ALBUMIN 2.3* 2.0*   BILITOT 0.7 0.6   ALKPHOS 108 90   AST 55* 38   ALT 41 41   ANIONGAP 8 9   EGFRNONAA 31.7* 40.8*

## 2018-04-16 NOTE — PROGRESS NOTES
[facility]  Hematology/Oncology  Progress Note    Patient Name: Naty St  Admission Date: 4/9/2018  Hospital Length of Stay: 6 days  Code Status: DNR     Subjective:     HPI:  72 year old female with DVT on Xeralto , Breast CA (1994), HTN, pacreatic CA (1998), meningioma s/p resection,  seizure disorder on Keppra, lung cancer in Feb 2016 with mets to brain and R hip, stroke in 10/17 with no residual deficit, and deppression. She presented to the ED with 2 days of fatigue and subjective fevers.   On the weekend she was feeling increasingly tired and weak. She was ambulating with assistance to the bathroom which is not her baseline. She was more short of breath and she has a dry cough that is worse than usual. She reports orthopnea due to bad coughing spills when she lay flat. She also reports some legs swellings, palpitations, and nausea. Her temp at home was 99.3.   She denies any change in appetite, urinary sx, diarrhea, wounds, rashes, or mouth sores.        Oncologic history from last clinic note:   Ms. Naty St was admitted to the hospital between 01/25/2016 and 01/28/2016. She has a history of pancreatic cancer 17 years ago, breast cancer and meningioma, presented to the hospital complaining of loss of consciousness. The patient apparently was in her normal state of health and she stood up to go to the bathroom and fell to the floor. The patient's daughter helped the mother up in the bathroom and noted that her mother's upper extremities were shaking and eye rolling. No reports of bowel or bladder incontinence, tongue biting or rolling. The second episode lasted about four minutes and she was extremely lethargic following that. Apparently, the patient had a meningioma resection done in the past; however, recently, underwent neurosurgical resection with Dr. Ferrera on 01/12/2016 and pathology from that revealed malignant neoplasm with multiple features pointing towards  "metastatic papillary serous adenocarcinoma.    Additional immunohistochemical stains were performed which revealed the tumor cells to be are positive for TTF1 and negative for ER and GCDFP. The morphology and TTF1 positivity are most consistent with lung primary.    Of note, imaging scan at the end of January 2016 revealed a mass in the lung at 2 cm in the medial aspect of the apical segment of the right upper lobe abutting the mediastinum at the level of the azygous vein and abutting and possibly encasing the segmental bronchi and vessels of the apical segment of the right upper lobe and no pleural fluid was present. Also, there is an enlarged right paratracheal lymph node. No evidence of any metastatic disease at the pancreatic site with postoperative changes post Whipple disease  Her PET Scan from 2/15/16 reveal "Hypermetabolic mass in the right lung apex consistent with a primary malignancy. Hypermetabolic mediastinal lymph nodes consistent with metastatic disease. Right sacral hypermetabolic lesion consistent with metastatic disease, noting additional mildly sclerotic lesions in multiple vertebral bodies which do not demonstrate abnormal hypermetabolism  She underwent IR bone biopsy which revealed metastatic adenocarcinoma of lung origin. 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  She has completed focal RT to brain lesions in March 2016.    PET scan from 6/6/16 shows "Dramatic almost complete response to therapy."  Lovenox started after US lower ext 6/7/16 shows Acute complete occlusion of one of the left posterior tibial vein.Remote partial thrombus of the proximal left superficial femoral vein.  She is on Tarceva.   12/6/16 PET scan reveals "Stable right upper lobe lesion and right hilar lymph node. No new lesions identified. Trace left pleural effusion".  1/27/17 Renal u/s "Medical renal disease. Nonobstructive right nephrolithiasis"  1/31/17 PET - "Right upper lobe nodule and right hilar lymph node similar " "and very low grade activity.  There is no definite evidence of recurrence."   11/9/17 PET "In this patient with history of lung cancer, there is interval increase in size and hypermetabolism of a right upper lobe lung lesion concerning for recurrent disease. Additionally, there is interval appearance of a hypermetabolic paratracheal lymph node suspicious for metastatic disease"   She progressed on Tarceva She underwent EBUS on 12/5/17. Pathology revealed adenocarcinoma but T790m could not be done as quantity was not insufficient. Her PET scan from 1/30/18 revealed The patient's previously identified abnormal lesions is slightly greater uptake of FDG on today's study compared with prior exam. These findings are concerning for progression of disease"    Interval History: her O2 sats are in the lower 90s off oxygen while she is resting. Will do 6 min walk test.    Oncology Treatment Plan:   [No treatment plan]    Medications:  Continuous Infusions:  Scheduled Meds:   albuterol-ipratropium 2.5mg-0.5mg/3mL  3 mL Nebulization Q4H    amitriptyline  50 mg Oral QHS    amoxicillin-clavulanate 875-125mg  1 tablet Oral Q12H    apixaban  10 mg Oral BID    atorvastatin  40 mg Oral Daily    dexamethasone  4 mg Oral Q12H    guaiFENesin  600 mg Oral BID    levETIRAcetam  750 mg Oral BID    lipase-protease-amylase 12,000-38,000-60,000 units  1 capsule Oral TID WM    pantoprazole  40 mg Oral Daily    polyethylene glycol  17 g Oral Daily    sodium bicarbonate  650 mg Oral TID     PRN Meds:sodium chloride, acetaminophen, dextrose 50%, dextrose 50%, duke's soln (benadryl 30 mL, mylanta 30 mL, lidocane 30 mL, nystatin 30 mL) 120 mL, glucagon (human recombinant), glucose, glucose, LORazepam, ondansetron, promethazine (PHENERGAN) IVPB, sodium chloride, sodium chloride 0.9%     Review of Systems   Constitutional: Negative for activity change, appetite change, fatigue and fever.   HENT: Negative for congestion and sinus pain.  "   Respiratory: Negative for cough and shortness of breath.    Cardiovascular: Negative for chest pain, palpitations and leg swelling.   Gastrointestinal: Negative for abdominal distention, abdominal pain, diarrhea, nausea and vomiting.   Genitourinary: Negative for decreased urine volume and dysuria.   Skin: Negative for rash and wound.     Objective:     Vital Signs (Most Recent):  Temp: 96.8 °F (36 °C) (04/16/18 0805)  Pulse: 103 (04/16/18 0805)  Resp: 19 (04/16/18 0805)  BP: (!) 112/57 (04/16/18 0805)  SpO2: 97 % (04/16/18 0805) Vital Signs (24h Range):  Temp:  [96.8 °F (36 °C)-98.5 °F (36.9 °C)] 96.8 °F (36 °C)  Pulse:  [] 103  Resp:  [16-20] 19  SpO2:  [89 %-100 %] 97 %  BP: (112-139)/(57-75) 112/57     Weight: 68 kg (149 lb 14.6 oz)  Body mass index is 27.42 kg/m².  Body surface area is 1.72 meters squared.      Intake/Output Summary (Last 24 hours) at 04/16/18 1115  Last data filed at 04/16/18 0900   Gross per 24 hour   Intake              720 ml   Output             1750 ml   Net            -1030 ml       Physical Exam   Constitutional: She is oriented to person, place, and time. She appears well-developed and well-nourished. No distress.   HENT:   Head: Normocephalic and atraumatic.   Eyes: EOM are normal. Right eye exhibits no discharge. Left eye exhibits no discharge.   Neck: Neck supple.   Cardiovascular: Normal rate, regular rhythm, normal heart sounds and intact distal pulses.    No murmur heard.  Pulmonary/Chest: No respiratory distress. She has no wheezes. She has no rales.   On 2-3 L      Abdominal: Soft. Bowel sounds are normal. She exhibits no distension. There is no tenderness. There is no guarding.   Musculoskeletal: She exhibits no edema.   Neurological: She is alert and oriented to person, place, and time.   She is oriented to person, place and time. She has moments of confusion and memory problem.    Skin: Skin is warm and dry. She is not diaphoretic. There is pallor.   Psychiatric: She  has a normal mood and affect.   Vitals reviewed.      Significant Labs:   CBC:   Recent Labs  Lab 04/15/18  0506 04/16/18  0611   WBC 17.66* 18.43*   HGB 10.4* 9.9*   HCT 32.8* 31.6*    283    and CMP:   Recent Labs  Lab 04/15/18  0506 04/16/18  0611    137   K 4.2 5.1    105   CO2 24 23   * 137*   BUN 30* 24*   CREATININE 1.6* 1.3   CALCIUM 8.1* 7.6*   PROT 5.8* 5.2*   ALBUMIN 2.3* 2.0*   BILITOT 0.7 0.6   ALKPHOS 108 90   AST 55* 38   ALT 41 41   ANIONGAP 8 9   EGFRNONAA 31.7* 40.8*         Assessment/Plan:     Acute hypoxemic respiratory failure    - not on home O2 but needed 4L NC upon arrival  - acute decompensation overnight 4/10-4/11 and was up to 15L HFNC  - unclear etiology but likely multifactorial and related to CHF and pneumonia on top of her lung cancer with other ddx including aspiration, pneumonitis, TRALI  - given lasix 40mg iv x1 4/11 with good UOP  - Will give her another dose of IV lasix 40 mg on 4/14   - Will d/c steroids and Augmentin on discharge.   - Will assess her need for oxygen at home.         Essential hypertension    - takes norvasc 5mg at home  - hold norvasc given borderline BP  - continue to monitor        Anticoagulant long-term use    - was on Xarelto for DVT at home  - switched to eliquis given borderline renal function, even at baseline (10 mg BID for 7 days then 5 mg BID. It was started on 4/11)        Anemia    Patient was given 1 unit of blood on 4/10 with appropriate response.         Diastolic dysfunction    Patient presented with failure to thrive. There was a concern of new onset Heart failure as she reports DELA CRUZ and orthopnea.   - 2D echo shows diastolic dysfunction and normal EF.   - CXR with worsening pulmonary congestion.   - strict I/Os  - daily weights  - continue to monitor  - PRN lasix           Seizure disorder    - Will continue her home dose of Keppra.         Stage 3 chronic kidney disease    - CKD   - strict I/Os  - avoid nephrotoxic  drugs, renally dose meds  - continue to monitor          History of stroke    She had a stroke in October/17.   - Will continue Lipitor 40 daily.         Malignant neoplasm of lower lobe of right lung    We are holding her chemotherapy for now.         History of breast cancer    - See HPI for more details.             Emily Phillip MD  Hematology/Oncology  [facility]    Attending Note  I have personally taken the history and examined this patient and agree with the resident's note as stated above.  Home with

## 2018-04-16 NOTE — PLAN OF CARE
Problem: Patient Care Overview  Goal: Plan of Care Review  Outcome: Ongoing (interventions implemented as appropriate)  Pt c/o mouth and throat pain; gave Duke's solution. No c/o nausea. Using bedside commode. UP with assistance. Vital signs stable. Gave PRN Xanax. Frequent rounds made to assess pain and safety. Patient remained free from falls. Bed in lowest position. Side rails up X 2. Call light within reach. Will continue to monitor.

## 2018-04-16 NOTE — PLAN OF CARE
Problem: Physical Therapy Goal  Goal: Physical Therapy Goal  Goals to be met by: 18     Patient will increase functional independence with mobility by performin. Supine to sit with Modified Garza. - GOAL MET  2. Sit to stand transfer with Stand-by Assistance. - GOAL MET  3. Bed to chair transfer with Stand-by Assistance using LRAD as needed. - GOAL MET  4. Gait  x 150 feet with Contact Guard Assistance using LRAD as needed.   5. Ascend/descend 8 stair with bilateral Handrails Contact Guard Assistance.   6. Lower extremity exercise program x 15 reps, with supervision, in order to increase LE strength and (I) with functional mobility.      Outcome: Unable to achieve outcome(s) by discharge  Pt achieved 3 goals.

## 2018-04-16 NOTE — PLAN OF CARE
[facility]    HOME HEALTH ORDERS  FACE TO FACE ENCOUNTER    Patient Name: Naty St  YOB: 1945    PCP: Olvin Mars MD   PCP Address: [address] [facility] / [place] [address]  PCP Phone Number: [phone]  PCP Fax: [phone]    Encounter Date: 04/16/2018    Admit to Home Health    Diagnoses:  Active Hospital Problems    Diagnosis  POA    Acute hypoxemic respiratory failure [J96.01]  No     Priority: 1 - High    Essential hypertension [I10]  Yes     Priority: 2     Anticoagulant long-term use [Z79.01]  Not Applicable     Priority: 3     Anemia [D64.9]  Yes     Priority: 4     Diastolic dysfunction [I51.9]  Yes     Priority: 4     Seizure disorder [G40.909]  No     Priority: 5     Stage 3 chronic kidney disease [N18.3]  Yes    History of stroke [Z86.73]  Not Applicable    Malignant neoplasm of lower lobe of right lung [C34.31]  Yes    History of breast cancer [Z85.3]  Not Applicable      Resolved Hospital Problems    Diagnosis Date Resolved POA    *Community acquired pneumonia [J18.9] 04/16/2018 Yes     Priority: 1 - High    UTI (urinary tract infection) [N39.0] 04/16/2018 Yes     Priority: 4        Future Appointments  Date Time Provider Department Center   5/18/2018 10:30 AM LAB, APPOINTMENT [facility] LAB VNP [facility]   5/18/2018 10:35 AM SPECIMEN, [facility] SPECLAB [facility]   5/24/2018 11:30 AM Mihaela Bazzi MD [facility] NEPHRO [facility]           I have seen and examined this patient face to face today. My clinical findings that support the need for the home health skilled services and home bound status are the following:  Weakness/numbness causing balance and gait disturbance due to Weakness/Debility making it taxing to leave home.    Allergies:  Review of patient's allergies indicates:   Allergen Reactions    Tobradex [tobramycin-dexamethasone] Swelling    Iodinated contrast- oral and iv dye Hives    Morphine Other  "(See Comments)     "shaking" and tremors    Latex Rash    Phenytoin sodium extended Other (See Comments) and Rash       Diet: regular diet    Activities: activity as tolerated    Nursing:   SN to complete comprehensive assessment including routine vital signs. Instruct on disease process and s/s of complications to report to MD. Review/verify medication list sent home with the patient at time of discharge  and instruct patient/caregiver as needed. Frequency may be adjusted depending on start of care date.    Notify MD if SBP > 160 or < 90; DBP > 90 or < 50; HR > 120 or < 50; Temp > 101      CONSULTS:    Physical Therapy to evaluate and treat. Evaluate for home safety and equipment needs; Establish/upgrade home exercise program. Perform / instruct on therapeutic exercises, gait training, transfer training, and Range of Motion.  Occupational Therapy to evaluate and treat. Evaluate home environment for safety and equipment needs. Perform/Instruct on transfers, ADL training, ROM, and therapeutic exercises.    MISCELLANEOUS CARE:  Home Oxygen:  Oxygen at 3 L/min nasal canula to be used Continuously at rest and at 6 L/min to be used continuously while active. and Assess oxygen saturation via pulse oximeter as needed for increase in SOB.    WOUND CARE ORDERS  n/a      Medications: Review discharge medications with patient and family and provide education.      Current Discharge Medication List      START taking these medications    Details   !! apixaban 5 mg Tab Take 2 tablets (10 mg total) by mouth 2 (two) times daily.  Qty: 6 tablet, Refills: 0      !! apixaban 5 mg Tab Take 1 tablet (5 mg total) by mouth 2 (two) times daily.  Qty: 60 tablet, Refills: 0      NYSTATIN (DUKE'S SOLUTION) Take 10 mLs by mouth 4 (four) times daily as needed (mouth sores/pain).  Qty: 240 mL, Refills: 0       !! - Potential duplicate medications found. Please discuss with provider.      CONTINUE these medications which have CHANGED    Details "   amLODIPine (NORVASC) 5 MG tablet Take 1 tablet (5 mg total) by mouth once daily. Hold until you follow up with your PCP as your BP has been on the lower side.  Qty: 30 tablet, Refills: 11      metoprolol succinate (TOPROL-XL) 50 MG 24 hr tablet Take 1 tablet (50 mg total) by mouth once daily. Hold until your appointment with your PCP. HR and BP has been normal off this medications.  Qty: 30 tablet, Refills: 11      osimertinib (TAGRISSO) 80 mg Tab Take 1 tablet by mouth once daily. Do not resume until Dr. Vazquez tells you to.  Qty: 30 tablet, Refills: 6    Associated Diagnoses: Adenocarcinoma of lung, right         CONTINUE these medications which have NOT CHANGED    Details   amitriptyline (ELAVIL) 50 MG tablet Take 1 tablet (50 mg total) by mouth every evening.  Qty: 30 tablet, Refills: 2    Comments: This prescription was filled today(12/27/2016). Any refills authorized will be placed on file.  Associated Diagnoses: Depression, unspecified depression type      atorvastatin (LIPITOR) 40 MG tablet Take 1 tablet (40 mg total) by mouth once daily.  Qty: 90 tablet, Refills: 3      CREON CpDR TAKE ONE CAPSULE BY MOUTH THREE TIMES A DAY WITH MEALS  Qty: 270 capsule, Refills: 3      denosumab (XGEVA) 120 mg/1.7 mL (70 mg/mL) Soln Inject 120 mg into the skin every 28 days.      dronabinol (MARINOL) 5 MG capsule Take 1 capsule (5 mg total) by mouth 2 (two) times daily before meals.  Qty: 60 capsule, Refills: 3    Associated Diagnoses: Malignant neoplasm of lower lobe of right lung; Anorexia      ergocalciferol (ERGOCALCIFEROL) 50,000 unit Cap Take 1 capsule (50,000 Units total) by mouth every 7 days. Take weekly for 8 weeks and once thereafter  Qty: 12 capsule, Refills: 3    Associated Diagnoses: Anemia of chronic renal failure, stage 3 (moderate); Vitamin D deficiency; CKD (chronic kidney disease) stage 3, GFR 30-59 ml/min; Hypomagnesemia      ferrous sulfate 325 mg (65 mg iron) Tab tablet Take 1 tablet (325 mg total) by  mouth 2 (two) times daily.  Qty: 180 tablet, Refills: 2    Associated Diagnoses: Anemia of chronic renal failure, stage 3 (moderate); Vitamin D deficiency; CKD (chronic kidney disease) stage 3, GFR 30-59 ml/min; Hypomagnesemia      levETIRAcetam (KEPPRA) 750 MG Tab Take 1 tablet (750 mg total) by mouth 2 (two) times daily.  Qty: 60 tablet, Refills: 3    Associated Diagnoses: Secondary adenocarcinoma of brain      LORazepam (ATIVAN) 1 MG tablet TAKE ONE TABLET BY MOUTH TWICE DAILY  Qty: 60 tablet, Refills: 1    Comments: This prescription was filled on 10/6/2017. Any refills authorized will be placed on file.      meclizine (ANTIVERT) 12.5 mg tablet Take 1-2 tablets (12.5-25 mg total) by mouth 3 (three) times daily as needed for Dizziness.  Qty: 30 tablet, Refills: 0      mirabegron 50 mg Tb24 Take 1 tablet (50 mg total) by mouth once daily.  Qty: 30 tablet, Refills: 11    Associated Diagnoses: OAB (overactive bladder)      multivitamin (THERAGRAN) per tablet Take 1 tablet by mouth once daily.      sodium bicarbonate 650 MG tablet Take 1 tablet (650 mg total) by mouth 3 (three) times daily. Increase dose to 2 60 mg tabs by mouth three times daily.  Qty: 270 tablet, Refills: 11      traMADol (ULTRAM) 50 mg tablet Take 1 tablet (50 mg total) by mouth every 6 (six) hours as needed for Pain.  Qty: 30 tablet, Refills: 0    Associated Diagnoses: Neoplasm related pain (acute) (chronic)      clindamycin phosphate 1% (CLINDAGEL) 1 % gel Apply topically 2 (two) times daily.  Qty: 60 g, Refills: 6    Associated Diagnoses: Acneiform drug eruption         STOP taking these medications       calcium citrate 250 mg calcium Tab Comments:   Reason for Stopping:         rivaroxaban (XARELTO) 20 mg Tab Comments:   Reason for Stopping:         doxycycline (VIBRAMYCIN) 100 MG Cap Comments:   Reason for Stopping:               I certify that this patient is confined to her home and needs intermittent skilled nursing care, physical therapy  and occupational therapy.

## 2018-04-17 ENCOUNTER — TELEPHONE (OUTPATIENT)
Dept: HEMATOLOGY/ONCOLOGY | Facility: CLINIC | Age: 73
End: 2018-04-17

## 2018-04-17 ENCOUNTER — OUTPATIENT CASE MANAGEMENT (OUTPATIENT)
Dept: ADMINISTRATIVE | Facility: OTHER | Age: 73
End: 2018-04-17

## 2018-04-17 NOTE — TELEPHONE ENCOUNTER
----- Message from Penny Cohen sent at 4/17/2018 11:38 AM CDT -----  Contact: Pt Son   Pt son called and states that Pt would like to speak with Dr. Vazquez  Callback#[phone]  Thank You  LOLLY Cohen

## 2018-04-17 NOTE — NURSING
Pt discharged to home at this time per MD order.  Med rec completed by MD prior to discharge and copy of current med list given to pt.  All discharge instructions, medications, prescriptions, and follow-up appointments reviewed with pt; copy given and all questions answered to pt's satisfaction.  PIV d/c'd at this time; pt with no IV access at time of discharge.  Pt to d/c home with home O2; O2 tanks delivered to room prior to discharge.  Pt ambulating in room with assistance; tolerating regular diet; voiding without difficulty; no c/o pain at this time.  All VSS; O2 sats WNL on 3L NC.  Pt left floor via wheelchair; accompanied by pt escort and family; home O2 in place; no distress noted.

## 2018-04-17 NOTE — TELEPHONE ENCOUNTER
----- Message from Ivett Herrera sent at 4/17/2018  2:59 PM CDT -----  [phone]  Edie, patient's home health nurse wanted to send a message to let the doctor know the patient came home from the hospital yesterday and will start the orders this week.    Edie may be reached at [phone] if any questions.    Thank you.  LC

## 2018-04-17 NOTE — PROGRESS NOTES
On call page from pts assigned nurse Isi [phone] advising that pt is unable to reach the 24 hour access line for her home oxygen delivery (per the directives of the [facility] staff that delivered her portable unit).  S/w son Basil who also had questions regarding a more compact oxygen unit.  Discussed various levels of oxygen service provided and encouraged him to s/w pts oncologist by phoning office or on her next doctor visit.  Provided pt/son with [facility] on call staff Kirt's direct # for scheduling delivery of pts home oxygen this evening.  No other needs identified at this time.

## 2018-04-17 NOTE — PROGRESS NOTES
Thank you for the referral. The following patient has been assigned to Maryann Borden RN with Outpatient Complex Care Management for high risk screening.    Reason for referral: Community acquired pneumonia, unspecified laterality    Please contact [facility] at [phone] with any questions.    Thank you,    Funmi Connell, [facility]  Outpatient Complex Care Mgmt  Ext [phone]

## 2018-04-18 NOTE — PHYSICIAN QUERY
"PT Name: Naty St  MR #: [id number]    Physician Query Form - Heart  Condition Clarification     CDS/: Lynda Ferrera RN             Contact information:  [phone]  This form is a permanent document in the medical record.     Query Date: April 18, 2018    By submitting this query, we are merely seeking further clarification of documentation. Please utilize your independent clinical judgment when addressing the question(s) below.    The medical record contains the following   Indicators     Supporting Clinical Findings Location in Medical Record   x BNP   BNP 42 Lab 4/9  Lab 4/12   x EF EF 50-55% Echo 4/9   x Radiology findings mild engorgement of the central pulmonary vascularity    Increasing bilateral airspace infiltrates/edema compared to prior. CXR 4/9      CXR 4/10     x Echo Results Low normal to mildly depressed left ventricular systolic function (EF 50-55%).     2 - Normal right ventricular systolic function .     3 - Impaired LV relaxation, elevated LAP (grade 2 diastolic dysfunction Echo 4/9    "Ascites" documented     x "SOB" or "DELA CRUZ" documented she reports DELA CRUZ and orthopnea.  Hematology note 4/12    "Hypoxia" documented     x Heart Failure documented There was a concern for new onset heart failure Hematology note 4/12   x "Edema" documented Positive for leg swelling     Exhibits edema 1+ in lower extremties    worsening pulmonary edema H&P 4/9          Pulmonology note 4/12   x Diuretics/Meds CXR with worsening pulmonary congestion.   - one dose of IV lasix 20 mg is given on 4/11.     Lasix 20 IV mg  And 40mg IV     Lasix 40 mg IV Hematology note 4/11          MAR 4/11    MAR 4/14     x Treatment: Patient had continued high oxygen demand yesterday. Lasix 40mg IV given yesterday with good UOP, 1.1L negative in past 24h. Patient reports subjective improvement but still remains with SOB    strict I/Os  - daily weights  - continue to monitor Hematology note 4/12   x Other:  " Diastolic dysfunction    Patient presented with failure to thrive. There was a concern of new onset Heart failure as she reports DELA CRUZ and orthopnea.   - 2D echo shows diastolic dysfunction and normal EF.   - Will hold on diuresis as her BP is border line.         Hematology note 4/10       Provider, please specify diagnosis or diagnoses associated with above clinical findings.                                 [x  ] Acute Diastolic Heart Failure ( EF > 40)* although ECHO pending  [  ] Acute Combined Systolic and Diastolic Heart Failure  [  ] Other Type of Heart Failure (please specify type): _________________________  [  ] Heart Failure Ruled Out  [  ] Other (please specify): ___________________________________              *American [employer] Association                                                                                                          Please document in your progress notes daily for the duration of treatment until resolved and include in your discharge summary.

## 2018-04-18 NOTE — PHYSICIAN QUERY
PT Name: Naty St  MR #: [id number]     Physician Query Form - Diagnosis Clarification      CDS/: Lynda Ferrera RN              Contact information:[phone]    This form is a permanent document in the medical record.     Query Date: April 18, 2018    By submitting this query, we are merely seeking further clarification of documentation.  Please utilize your independent clinical judgment when addressing the question(s) below.     The medical record contains the following:      Findings Supporting Clinical Information Location in Medical Record   She has acute hypoxemic respiratory failure. Differential is aspiration pneumonia versus TRALI versus Heart failure but not limited to this.    Acute hypoxemic respiratory failure  unclear etiology but likely multifactorial and related to CHF and pneumonia on top of her lung cancer with other ddx including aspiration, pneumonitis, TRALI     if TRALI is the etiology, it will take time to resolve fully     There was a concern of TRALI vs TACO.     chest x-ray showing worsening pulmonary edema, likely TRALI vs TACO but is improving  - Initially could have been due to a viral URI with transfusion causing a mild ALI.  Improving with supportive care            PN 4/11 at 207p            Hematology note 4/12                  Pulm consult  4/12      DC Summary 4/16        Pulm note 4/13     Please clarify if the ___TRALI ________________________ diagnosis has been:    [  ] Ruled In  [  ] Ruled In, Now Resolved  [ x ] Ruled Out  [  ] Clinically undetermined  [  ] Other/Clarification of findings (please specify)_______________________________    Please document in your progress notes daily for the duration of treatment, until resolved, and include in your discharge summary.

## 2018-04-18 NOTE — PHYSICIAN QUERY
"PT Name: Naty St  MR #: [id number]    Physician Query Form - Pneumonia Clarification     CDS/: Lynda Ferrera RN             Contact information:[phone]  This form is a permanent document in the medical record.    Query Date:  April 18, 2018    By submitting this query, we are merely seeking further clarification of documentation. Please utilize your independent clinical judgment when addressing the question(s) below.    The Medical record contains the following:   Indicators   Supporting Clinical Findings Location in Medical Record   x "Pneumonia" documented Community acquired pneumonia H&P 4/9   x Chest X-Ray: Cardiomegaly with increasing bilateral airspace infiltrates/edema. CXR 4/10   x PaO2    PaCO2     O2 sat O2 92% H&P 4/9    Cultures      x Treatment  Moxifoxacin IV  Vanc  Cefepime MAR 4/9- 4/15   x Supplemental O2 4/10 she is on 5 L nc of oxygen.  She spike a fever of 101.2 last night.   - 4/11: O2 requirements increased to 10 L comfort flow Hematology PN 4/11   x Other Positive for cough and shortness of breath    worsening non productive cough and SOB. CXR with abnormal infiltration vs congestion. Her WBC is 12.33 K.  She is not on home oxygen. She was on 2-4 L of oxygen in the ED and her sats are in the lower 90s%.  H&P 4/9       Provider, for accurate coding purposes, if known,  please   Further    specify type of pneumonia.    [ x ] Bacterial Pneumonia (Specify organism): __no culture obtained____________________  [  ] Bacterial, Gram Negative organism Pneumoniax  [  ] Aspiration Pneumonia  [  ] Other type of pneumonia (please specify): ______________________________________  [  ] Clinically undetermined    Please document in your progress notes daily for the duration of treatment, until resolved, and include in your discharge summary.    .                                                                                    "

## 2018-04-18 NOTE — PHYSICIAN QUERY
PT Name: Naty St  MR #: [id number]     Physician Query Form - Diagnosis Clarification      CDS/: Lynda Ferrera RN              Contact information: [phone]    This form is a permanent document in the medical record.     Query Date: April 18, 2018    By submitting this query, we are merely seeking further clarification of documentation.  Please utilize your independent clinical judgment when addressing the question(s) below.     The medical record contains the following:      Findings Supporting Clinical Information Location in Medical Record   There was a concern of TRALI vs TACO       chest x-ray showing worsening pulmonary edema, likely TRALI vs TACO but is improving  - Initially could have been due to a viral URI with transfusion causing a mild ALI.  Improving with supportive care              DC Summary 4/16        Pulm note 4/13       Please clarify if the _____TACO______________________ diagnosis has been:    [  ] Ruled In  [  ] Ruled In, Now Resolved  [ x ] Ruled Out  [  ] Clinically undetermined  [  ] Other/Clarification of findings (please specify)_______________________________    Please document in your progress notes daily for the duration of treatment, until resolved, and include in your discharge summary.

## 2018-04-19 ENCOUNTER — TELEPHONE (OUTPATIENT)
Dept: ADMINISTRATIVE | Facility: CLINIC | Age: 73
End: 2018-04-19

## 2018-04-19 ENCOUNTER — TELEPHONE (OUTPATIENT)
Dept: HEMATOLOGY/ONCOLOGY | Facility: CLINIC | Age: 73
End: 2018-04-19

## 2018-04-19 RX ORDER — LORAZEPAM 1 MG/1
1 TABLET ORAL 2 TIMES DAILY
Qty: 60 TABLET | Refills: 1 | Status: SHIPPED | OUTPATIENT
Start: 2018-04-19 | End: 2018-07-14 | Stop reason: SDUPTHER

## 2018-04-19 NOTE — TELEPHONE ENCOUNTER
Tramadol ordered by Erica La NP on 02/19/2018.  Elavil was ordered by you which she has been on since 05/17/2012

## 2018-04-19 NOTE — TELEPHONE ENCOUNTER
----- Message from Penny Cohen sent at 4/19/2018 10:17 AM CDT -----  Contact: Pt Son Basil  Pt son called to speak with Nurse Zandra Kennedy#[phone]  Thank You  LOLLY Cohen

## 2018-04-19 NOTE — TELEPHONE ENCOUNTER
----- Message from Penny Cohen sent at 4/19/2018 10:14 AM CDT -----  Contact: son basil  Pt son Basil called to speak with Nurse Zandra this is the Pt son 2nd time calling   Callback#  Thank You  LOLLY Cohen

## 2018-04-19 NOTE — TELEPHONE ENCOUNTER
----- Message from Willie Ramirez sent at 4/19/2018  9:54 AM CDT -----  Contact: Renetta   Will like a call from Zandra in regards to pt care     Contact::[phone]

## 2018-04-19 NOTE — TELEPHONE ENCOUNTER
Thanks. Pt should talk to that provider or department about whether they are ok with this from an interaction standpoint since they added it.

## 2018-04-19 NOTE — PATIENT INSTRUCTIONS
During home health admit the following med interaction were found   AMITRIPTYLINE ORAL INTERACTS WITH TRAMADOL ORAL  MONOGRAPH TITLE  TAPENTADOL; TRAMADOL/TRICYCLIC COMPOUNDS; CARBAMAZEPINE  SEVERITY LEVEL  2-SEVERE INTERACTION ACTION IS REQUIRED TO REDUCE THE RISK OF SEVERE ADVERSE INTERACTION.          Riley Betts RN   [facility] [place]   [phone]      Thanks

## 2018-04-19 NOTE — TELEPHONE ENCOUNTER
Spoke with son, who is calling to state he has been calling the clinic daily since Tuesday---with no response. Nurse explained to son nurse only received a phone call on Tuesday, and I left a VM for him to contact the clinic back.    Son is very upset with nurse for not receiving his phone calls.  Nurse explained again---nurse can only return phone calls she receives from phone staff.    Nurse spoke with patient---she is agreeable to waiting to speak with dr sullivan tomorrow at her clinic visit.

## 2018-04-20 ENCOUNTER — TELEPHONE (OUTPATIENT)
Dept: PHARMACY | Facility: CLINIC | Age: 73
End: 2018-04-20

## 2018-04-20 ENCOUNTER — OFFICE VISIT (OUTPATIENT)
Dept: HEMATOLOGY/ONCOLOGY | Facility: CLINIC | Age: 73
End: 2018-04-20
Payer: MEDICARE

## 2018-04-20 VITALS
HEART RATE: 102 BPM | RESPIRATION RATE: 20 BRPM | WEIGHT: 148.38 LBS | DIASTOLIC BLOOD PRESSURE: 70 MMHG | OXYGEN SATURATION: 91 % | TEMPERATURE: 98 F | HEIGHT: 66 IN | SYSTOLIC BLOOD PRESSURE: 118 MMHG | BODY MASS INDEX: 23.84 KG/M2

## 2018-04-20 DIAGNOSIS — N18.30 CKD (CHRONIC KIDNEY DISEASE) STAGE 3, GFR 30-59 ML/MIN: ICD-10-CM

## 2018-04-20 DIAGNOSIS — C34.31 MALIGNANT NEOPLASM OF LOWER LOBE OF RIGHT LUNG: Primary | ICD-10-CM

## 2018-04-20 DIAGNOSIS — C79.31 BRAIN METASTASES: ICD-10-CM

## 2018-04-20 DIAGNOSIS — C79.51 SECONDARY CANCER OF BONE: ICD-10-CM

## 2018-04-20 PROCEDURE — 99999 PR PBB SHADOW E&M-EST. PATIENT-LVL IV: CPT | Mod: PBBFAC,,, | Performed by: INTERNAL MEDICINE

## 2018-04-20 PROCEDURE — 3074F SYST BP LT 130 MM HG: CPT | Mod: CPTII,S$GLB,, | Performed by: INTERNAL MEDICINE

## 2018-04-20 PROCEDURE — 99215 OFFICE O/P EST HI 40 MIN: CPT | Mod: S$GLB,,, | Performed by: INTERNAL MEDICINE

## 2018-04-20 PROCEDURE — 99499 UNLISTED E&M SERVICE: CPT | Mod: S$GLB,,, | Performed by: INTERNAL MEDICINE

## 2018-04-20 PROCEDURE — 3078F DIAST BP <80 MM HG: CPT | Mod: CPTII,S$GLB,, | Performed by: INTERNAL MEDICINE

## 2018-04-20 NOTE — PROGRESS NOTES
"Subjective:       Patient ID: Naty St is a 72 y.o. female.    Chief Complaint: Malignant neoplasm of lower lobe of right lung  Oncologic History:  Ms. Naty St was admitted to the hospital between 01/25/2016 and 01/28/2016. She has a history of pancreatic cancer 17 years ago, breast cancer and meningioma, presented to the hospital complaining of loss of consciousness. The patient apparently was in her normal state of health and she stood up to go to the bathroom and fell to the floor. The patient's daughter helped the mother up in the bathroom and noted that her mother's upper extremities were shaking and eye rolling. No reports of bowel or bladder incontinence, tongue biting or rolling. The second episode lasted about four minutes and she was extremely lethargic following that. Apparently, the patient had a meningioma resection done in the past; however, recently, underwent neurosurgical resection with Dr. 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    Additional immunohistochemical stains were performed which revealed the tumor cells to be are positive for TTF1 and negative for ER and GCDFP. The morphology and TTF1 positivity are most consistent with lung primary.    Of note, imaging scan at the end of January 2016 revealed a mass in the lung at 2 cm in the medial aspect of the apical segment of the right upper lobe abutting the mediastinum at the level of the azygous vein and abutting and possibly encasing the segmental bronchi and vessels of the apical segment of the right upper lobe and no pleural fluid was present. Also, there is an enlarged right paratracheal lymph node. No evidence of any metastatic disease at the pancreatic site with postoperative changes post Whipple disease  Her PET Scan from 2/15/16 reveal "Hypermetabolic mass in the right lung apex consistent with a " "primary malignancy. Hypermetabolic mediastinal lymph nodes consistent with metastatic disease. Right sacral hypermetabolic lesion consistent with metastatic disease, noting additional mildly sclerotic lesions in multiple vertebral bodies which do not demonstrate abnormal hypermetabolism  She underwent IR bone biopsy which revealed metastatic adenocarcinoma of lung origin. 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  She has completed focal RT to brain lesions in March 2016.    PET scan from 6/6/16 shows "Dramatic almost complete response to therapy."  Lovenox started after US lower ext 6/7/16 shows Acute complete occlusion of one of the left posterior tibial vein.Remote partial thrombus of the proximal left superficial femoral vein.  She is on Tarceva.   12/6/16 PET scan reveals "Stable right upper lobe lesion and right hilar lymph node. No new lesions identified. Trace left pleural effusion".  1/27/17 Renal u/s "Medical renal disease. Nonobstructive right nephrolithiasis"  1/31/17 PET - "Right upper lobe nodule and right hilar lymph node similar and very low grade activity.  There is no definite evidence of recurrence."   11/9/17 PET "In this patient with history of lung cancer, there is interval increase in size and hypermetabolism of a right upper lobe lung lesion concerning for recurrent disease. Additionally, there is interval appearance of a hypermetabolic paratracheal lymph node suspicious for metastatic disease"                 She progressed on Tarceva She underwent EBUS on 12/5/17. Pathology revealed adenocarcinoma but T790m could not be done as quantity was not insufficient. Her PET scan from 1/30/18 revealed The patient's previously identified abnormal lesions is slightly greater uptake of FDG on today's study compared with prior exam. These findings are concerning for progression of disease"      HPI She is now on Tagrisso since 2/15/18 and comes in to review labs.  She was hospitalized between " "4/9/18-4/16/18. Her hospital course was as follows "Patient was admitted to hemonc service on 4/9 with acute hypoxic respiratory failure. She was requiring 4 L of nc on admission. She was started on moxi for CAP. She received one dose of ceftriaxone in the ED. On 2nd day of admission she spiked a fever. Her Hb was ~7 g/dl so she was transfused 1 unit of PRBCs. Over night she developed worsening of her symptoms with increased O2 requirements to 15 L HFNC. Her CXR showed worsening congestion. There was a concern of TRALI vs TACO. New 2D echo with diastolic dysfunction. She was given IV lasix pushes as needed and was started on dexamethasone.  Her abx was escalated to vanc and cefepime. She improved with diuresis and steroids. Pulmonary were consulted. Her O2 requirements continued to improve. On 4/15 she was switched to Augmentin. PT recommended discharging her to SNF but the patient refused and she wanted to go home with home health. She qualified for home oxygen so she was discharged on 3 L nc while at rest and 6 while active. Augmentin and dexamethasone were discontinued on discharge"  She is now at home and is using home oxygen 3 liters nasal canula.  She is restarting Tagrisso.          Review of Systems   Constitutional: Negative for appetite change, fatigue and unexpected weight change.   HENT: Negative for mouth sores.    Eyes: Negative for visual disturbance.   Respiratory: Positive for cough and shortness of breath.    Cardiovascular: Negative for chest pain.   Gastrointestinal: Negative for abdominal pain and diarrhea.   Genitourinary: Negative for frequency.   Musculoskeletal: Negative for back pain.   Skin: Negative for rash.   Neurological: Negative for headaches.   Hematological: Negative for adenopathy.   Psychiatric/Behavioral: The patient is not nervous/anxious.    All other systems reviewed and are negative.      PMFSH: all information reviewed and updated as relevant to today's visit  Objective:    "   Physical Exam   Constitutional: She is oriented to person, place, and time. She appears well-developed and well-nourished.   HENT:   Mouth/Throat: No oropharyngeal exudate.   Cardiovascular: Normal rate and normal heart sounds.    Pulmonary/Chest: Effort normal and breath sounds normal. She has no wheezes.   Abdominal: Soft. Bowel sounds are normal. There is no tenderness.   Musculoskeletal: She exhibits no edema or tenderness.   Lymphadenopathy:     She has no cervical adenopathy.   Neurological: She is alert and oriented to person, place, and time. Coordination normal.   Skin: Skin is warm and dry. No rash noted.   Psychiatric: She has a normal mood and affect. Judgment and thought content normal.   Vitals reviewed.        LABS:  WBC   Date Value Ref Range Status   04/20/2018 13.45 (H) 3.90 - 12.70 K/uL Final     Hemoglobin   Date Value Ref Range Status   04/20/2018 11.2 (L) 12.0 - 16.0 g/dL Final     POC Hematocrit   Date Value Ref Range Status   01/12/2016 25 (L) 36 - 54 %PCV Final     Hematocrit   Date Value Ref Range Status   04/20/2018 37.1 37.0 - 48.5 % Final     Platelets   Date Value Ref Range Status   04/20/2018 249 150 - 350 K/uL Final     Gran # (ANC)   Date Value Ref Range Status   04/20/2018 10.8 (H) 1.8 - 7.7 K/uL Final     Comment:     The ANC is based on a white cell differential from an   automated cell counter. It has not been microscopically   reviewed for the presence of abnormal cells. Clinical   correlation is required.         Chemistry        Component Value Date/Time     04/20/2018 0724    K 5.4 (H) 04/20/2018 0724     04/20/2018 0724    CO2 26 04/20/2018 0724    BUN 27 (H) 04/20/2018 0724    CREATININE 1.5 (H) 04/20/2018 0724     04/20/2018 0724        Component Value Date/Time    CALCIUM 8.7 04/20/2018 0724    ALKPHOS 87 04/20/2018 0724    AST 28 04/20/2018 0724    ALT 32 04/20/2018 0724    BILITOT 0.7 04/20/2018 0724    ESTGFRAFRICA 39.6 (A) 04/20/2018 0724     EGFRNONAA 34.3 (A) 04/20/2018 0724          Assessment:       1. Malignant neoplasm of lower lobe of right lung    2. Brain metastases    3. Secondary cancer of bone    4. CKD (chronic kidney disease) stage 3, GFR 30-59 ml/min        Plan:        1,2,3. She is feeling better now and will restart Tagrisso. Will plan on restaging scans in 2 weeks  She will continue with home Oxygen  4. Stable, monitor for now.    Above care plan was discussed with patient and accompanying  and all questions were addressed to their satisfaction

## 2018-04-24 ENCOUNTER — TELEPHONE (OUTPATIENT)
Dept: PHARMACY | Facility: CLINIC | Age: 73
End: 2018-04-24

## 2018-04-24 ENCOUNTER — PES CALL (OUTPATIENT)
Dept: ADMINISTRATIVE | Facility: CLINIC | Age: 73
End: 2018-04-24

## 2018-04-25 ENCOUNTER — OUTPATIENT CASE MANAGEMENT (OUTPATIENT)
Dept: ADMINISTRATIVE | Facility: OTHER | Age: 73
End: 2018-04-25

## 2018-04-25 NOTE — TELEPHONE ENCOUNTER
Spoke with Ms. St regarding her rash that has developed about 2 days ago.  She re-started Tagrisso about a week ago after being in the hospital.  She did not recall starting anything else new and denies any new foods.  She said its red and itchy.  The rash is currently on her upper thigh.  Advised that she try some hydrocortisone cream on the area.  [facility] will follow up with her in a few days to see if rash is improving.  She denies any broken, blistered or peeling skin at this time.  Advised patient to reach out to [facility] or provider if rash worsens or spreads over large parts of her body.  Patient expressed understanding.

## 2018-04-25 NOTE — PROGRESS NOTES
Attempt to complete initial assessment for Outpatient Care Management; left message requesting return call. Loli RN-OPCM

## 2018-04-26 ENCOUNTER — TELEPHONE (OUTPATIENT)
Dept: HEMATOLOGY/ONCOLOGY | Facility: CLINIC | Age: 73
End: 2018-04-26

## 2018-04-26 PROCEDURE — 99285 EMERGENCY DEPT VISIT HI MDM: CPT | Mod: ,,, | Performed by: EMERGENCY MEDICINE

## 2018-04-26 PROCEDURE — 99285 EMERGENCY DEPT VISIT HI MDM: CPT | Mod: 25

## 2018-04-26 NOTE — TELEPHONE ENCOUNTER
So she was discharged home with home health as she did not want to go SNF. So if she is that week, may be appropriate for ED

## 2018-04-26 NOTE — TELEPHONE ENCOUNTER
Left message for patient's son letting him know that PET has an availability tomorrow at 1 pm. To please call back if that works for his mother.     Son called back and spoke to Zandra, confirmed appointment for PET tomorrow at 1 PM.

## 2018-04-26 NOTE — TELEPHONE ENCOUNTER
Spoke with the  Meseret at [facility] re: fax not coming through with the current PT and OT notes. Meseret emailed them to me, and I forwarded them to Clarisa at [facility] after speaking on the phone again with her. Also forwarded the email to Dr. Vazquez and ROSA De Paz.  Just received call from [facility]'s liason Lisa who stated that they reviewed the notes and don't feel that patient is appropriate right now, and if anything she should go to the ED to be evaluated as her most recent note indicates change in status - lethargic and unable to do what she previously could in prior visits.  Notifying Dr. Vazquez and ROSA De Paz of this, and requesting that they contact patient's son to follow up.

## 2018-04-26 NOTE — TELEPHONE ENCOUNTER
Spoke with patient's son Basil via phone to [phone] re: situation. He relates that patient has declined since going home from the hospital, she is weak and confused at times, has fallen. At first he stated that she hasn't received the home health therapy visits that were set up, but later stated he doesn't know if they've been out or not. Explained to him that that I would follow up with Dr. Vazquez and her nurse Zandra, and also contact [facility]. He said that patient is now agreeable to going to SNF. Son feels this is what she needs. He's called to reschedule her PET scan because she was to weak to get up and walk. Suggested that she may need to go to the ED to be evaluated but he doesn't agree. Explained to him that a short skilled stay was recommended by the therapists in the hospital, and the MDs ordered the consult to SNF, but patient refused to go and instead wanted to go home, even after MDs and I spoke with her about SNF. Informed him that it may/may not be possible for her to be admitted to SNF from home but I will contact the [facility] liason to discuss this. Informed him that they will need to review the current therapy notes from [facility]. Informed him that I would call him back later today with an update.  Spoke with RN Zandra re: above. Called [facility] and spoke with Meseret, the  over patient's case, and she is faxing the eval and current notes to me. She confirmed that nursing, PT, and OT are all seeing patient at home, and that they admitted her for home health on the day after she discharged from the hospital. She read part of the therapy note from yesterday - patient seemed lethargic and was having periods of confusion. Called [facility] liason ext. [phone] and spoke with Clarisa Sequeira, who is working with Lisa, and she will pull up patient's chart information. Informed her that I will contact her and then fax the notes from the [facility] therapists once I have received them. Will also deliver the  notes to Dr. Vazquez and ROSA De Paz for their review. Will continue to follow.

## 2018-04-26 NOTE — TELEPHONE ENCOUNTER
Spoke to patient's son. Rescheduled PET for first available which is 5/4 at 7:30. Son would prefer a different appointment day and time. Let him know that I would call the imaging center to see if there were any other dates or times available.  Son concerned that his mother missed April Xgeva shot. Assured him that one missed shot was okay, if we gave her the shot now she would not get the shot in May, we have to wait a certain number of days between shots. Son verbalized an understanding.

## 2018-04-26 NOTE — TELEPHONE ENCOUNTER
----- Message from Penny Cohen sent at 4/26/2018 10:17 AM CDT -----  Contact: Son  Basil Gracia called and needs to speak with Nurse Zandra Kennedy#[phone]  Thank You  LOLLY Cohen

## 2018-04-26 NOTE — TELEPHONE ENCOUNTER
----- Message from Penny Cohen sent at 4/26/2018  9:21 AM CDT -----  Contact: Pt   Pt son called to speak with nurse Zandra and states that pt is not feeling well and needs to reschedule PET scan that was for today 4/26  Callback#[phone]  Thank You  LOLLY Cohen

## 2018-04-27 ENCOUNTER — OUTPATIENT CASE MANAGEMENT (OUTPATIENT)
Dept: ADMINISTRATIVE | Facility: OTHER | Age: 73
End: 2018-04-27

## 2018-04-27 ENCOUNTER — HOSPITAL ENCOUNTER (INPATIENT)
Facility: HOSPITAL | Age: 73
LOS: 5 days | Discharge: HOME HEALTH | DRG: 189 | End: 2018-05-02
Attending: EMERGENCY MEDICINE | Admitting: INTERNAL MEDICINE
Payer: MEDICARE

## 2018-04-27 DIAGNOSIS — R06.00 DYSPNEA: ICD-10-CM

## 2018-04-27 DIAGNOSIS — C34.31 MALIGNANT NEOPLASM OF LOWER LOBE OF RIGHT LUNG: ICD-10-CM

## 2018-04-27 DIAGNOSIS — R91.8 PULMONARY INFILTRATES ON CXR: ICD-10-CM

## 2018-04-27 DIAGNOSIS — I50.30 DIASTOLIC CHF: ICD-10-CM

## 2018-04-27 DIAGNOSIS — R00.0 TACHYCARDIA: ICD-10-CM

## 2018-04-27 DIAGNOSIS — R06.02 SHORTNESS OF BREATH: ICD-10-CM

## 2018-04-27 DIAGNOSIS — J96.21 ACUTE ON CHRONIC RESPIRATORY FAILURE WITH HYPOXIA: Primary | ICD-10-CM

## 2018-04-27 PROBLEM — R53.81 DEBILITY: Status: ACTIVE | Noted: 2018-04-27

## 2018-04-27 PROBLEM — E87.5 HYPERKALEMIA: Status: ACTIVE | Noted: 2018-04-27

## 2018-04-27 PROBLEM — G93.41 METABOLIC ENCEPHALOPATHY: Status: ACTIVE | Noted: 2018-04-27

## 2018-04-27 PROBLEM — R53.1 WEAKNESS: Status: ACTIVE | Noted: 2018-04-27

## 2018-04-27 LAB
ANION GAP SERPL CALC-SCNC: 5 MMOL/L
ANION GAP SERPL CALC-SCNC: 6 MMOL/L
ANION GAP SERPL CALC-SCNC: 9 MMOL/L
BACTERIA #/AREA URNS AUTO: ABNORMAL /HPF
BASOPHILS # BLD AUTO: 0.03 K/UL
BASOPHILS NFR BLD: 0.2 %
BILIRUB UR QL STRIP: NEGATIVE
BNP SERPL-MCNC: 72 PG/ML
BUN SERPL-MCNC: 24 MG/DL
BUN SERPL-MCNC: 24 MG/DL
BUN SERPL-MCNC: 25 MG/DL
CALCIUM SERPL-MCNC: 9.3 MG/DL
CALCIUM SERPL-MCNC: 9.7 MG/DL
CALCIUM SERPL-MCNC: 9.7 MG/DL
CHLORIDE SERPL-SCNC: 102 MMOL/L
CHLORIDE SERPL-SCNC: 106 MMOL/L
CHLORIDE SERPL-SCNC: 107 MMOL/L
CLARITY UR REFRACT.AUTO: ABNORMAL
CO2 SERPL-SCNC: 24 MMOL/L
CO2 SERPL-SCNC: 26 MMOL/L
CO2 SERPL-SCNC: 28 MMOL/L
COLOR UR AUTO: YELLOW
CREAT SERPL-MCNC: 1.8 MG/DL
CREAT SERPL-MCNC: 1.9 MG/DL
CREAT SERPL-MCNC: 1.9 MG/DL
DIFFERENTIAL METHOD: ABNORMAL
EOSINOPHIL # BLD AUTO: 1.9 K/UL
EOSINOPHIL NFR BLD: 14.6 %
ERYTHROCYTE [DISTWIDTH] IN BLOOD BY AUTOMATED COUNT: 18.4 %
EST. GFR  (AFRICAN AMERICAN): 29.7 ML/MIN/1.73 M^2
EST. GFR  (AFRICAN AMERICAN): 29.7 ML/MIN/1.73 M^2
EST. GFR  (AFRICAN AMERICAN): 31.7 ML/MIN/1.73 M^2
EST. GFR  (NON AFRICAN AMERICAN): 25.8 ML/MIN/1.73 M^2
EST. GFR  (NON AFRICAN AMERICAN): 25.8 ML/MIN/1.73 M^2
EST. GFR  (NON AFRICAN AMERICAN): 27.5 ML/MIN/1.73 M^2
GLUCOSE SERPL-MCNC: 101 MG/DL
GLUCOSE SERPL-MCNC: 112 MG/DL
GLUCOSE SERPL-MCNC: 95 MG/DL
GLUCOSE UR QL STRIP: NEGATIVE
HCT VFR BLD AUTO: 34.3 %
HGB BLD-MCNC: 10.6 G/DL
HGB UR QL STRIP: ABNORMAL
IMM GRANULOCYTES # BLD AUTO: 0.09 K/UL
IMM GRANULOCYTES NFR BLD AUTO: 0.7 %
KETONES UR QL STRIP: NEGATIVE
LACTATE SERPL-SCNC: 0.9 MMOL/L
LEUKOCYTE ESTERASE UR QL STRIP: ABNORMAL
LYMPHOCYTES # BLD AUTO: 0.9 K/UL
LYMPHOCYTES NFR BLD: 7.1 %
MAGNESIUM SERPL-MCNC: 2.6 MG/DL
MCH RBC QN AUTO: 26.2 PG
MCHC RBC AUTO-ENTMCNC: 30.9 G/DL
MCV RBC AUTO: 85 FL
MICROSCOPIC COMMENT: ABNORMAL
MONOCYTES # BLD AUTO: 0.9 K/UL
MONOCYTES NFR BLD: 6.9 %
NEUTROPHILS # BLD AUTO: 9 K/UL
NEUTROPHILS NFR BLD: 70.5 %
NITRITE UR QL STRIP: NEGATIVE
NON-SQ EPI CELLS #/AREA URNS AUTO: 8 /HPF
NRBC BLD-RTO: 0 /100 WBC
PH UR STRIP: 8 [PH] (ref 5–8)
PLATELET # BLD AUTO: 285 K/UL
PMV BLD AUTO: 10.4 FL
POTASSIUM SERPL-SCNC: 4.9 MMOL/L
POTASSIUM SERPL-SCNC: 5.1 MMOL/L
POTASSIUM SERPL-SCNC: 5.9 MMOL/L
PROCALCITONIN SERPL IA-MCNC: 0.23 NG/ML
PROT UR QL STRIP: NEGATIVE
RBC # BLD AUTO: 4.04 M/UL
RBC #/AREA URNS AUTO: 5 /HPF (ref 0–4)
SODIUM SERPL-SCNC: 135 MMOL/L
SODIUM SERPL-SCNC: 138 MMOL/L
SODIUM SERPL-SCNC: 140 MMOL/L
SP GR UR STRIP: 1 (ref 1–1.03)
SQUAMOUS #/AREA URNS AUTO: 28 /HPF
TROPONIN I SERPL DL<=0.01 NG/ML-MCNC: 0.03 NG/ML
TROPONIN I SERPL DL<=0.01 NG/ML-MCNC: 0.03 NG/ML
TROPONIN I SERPL DL<=0.01 NG/ML-MCNC: 0.04 NG/ML
TROPONIN I SERPL DL<=0.01 NG/ML-MCNC: 0.04 NG/ML
URN SPEC COLLECT METH UR: ABNORMAL
UROBILINOGEN UR STRIP-ACNC: NEGATIVE EU/DL
WBC # BLD AUTO: 12.75 K/UL
WBC #/AREA URNS AUTO: >100 /HPF (ref 0–5)

## 2018-04-27 PROCEDURE — 87086 URINE CULTURE/COLONY COUNT: CPT

## 2018-04-27 PROCEDURE — 87077 CULTURE AEROBIC IDENTIFY: CPT

## 2018-04-27 PROCEDURE — 84484 ASSAY OF TROPONIN QUANT: CPT

## 2018-04-27 PROCEDURE — 84145 PROCALCITONIN (PCT): CPT

## 2018-04-27 PROCEDURE — 25000003 PHARM REV CODE 250: Performed by: EMERGENCY MEDICINE

## 2018-04-27 PROCEDURE — 20600001 HC STEP DOWN PRIVATE ROOM

## 2018-04-27 PROCEDURE — 80048 BASIC METABOLIC PNL TOTAL CA: CPT | Mod: 91

## 2018-04-27 PROCEDURE — 83880 ASSAY OF NATRIURETIC PEPTIDE: CPT

## 2018-04-27 PROCEDURE — 63600175 PHARM REV CODE 636 W HCPCS: Performed by: INTERNAL MEDICINE

## 2018-04-27 PROCEDURE — 25000003 PHARM REV CODE 250: Performed by: INTERNAL MEDICINE

## 2018-04-27 PROCEDURE — 80048 BASIC METABOLIC PNL TOTAL CA: CPT

## 2018-04-27 PROCEDURE — 85025 COMPLETE CBC W/AUTO DIFF WBC: CPT

## 2018-04-27 PROCEDURE — 93010 ELECTROCARDIOGRAM REPORT: CPT | Mod: ,,, | Performed by: INTERNAL MEDICINE

## 2018-04-27 PROCEDURE — 83735 ASSAY OF MAGNESIUM: CPT

## 2018-04-27 PROCEDURE — 81001 URINALYSIS AUTO W/SCOPE: CPT

## 2018-04-27 PROCEDURE — 83605 ASSAY OF LACTIC ACID: CPT

## 2018-04-27 PROCEDURE — 87088 URINE BACTERIA CULTURE: CPT

## 2018-04-27 PROCEDURE — 96374 THER/PROPH/DIAG INJ IV PUSH: CPT

## 2018-04-27 PROCEDURE — 93005 ELECTROCARDIOGRAM TRACING: CPT

## 2018-04-27 PROCEDURE — 87186 SC STD MICRODIL/AGAR DIL: CPT

## 2018-04-27 PROCEDURE — 36415 COLL VENOUS BLD VENIPUNCTURE: CPT

## 2018-04-27 PROCEDURE — 96361 HYDRATE IV INFUSION ADD-ON: CPT

## 2018-04-27 PROCEDURE — 99223 1ST HOSP IP/OBS HIGH 75: CPT | Mod: AI,GC,, | Performed by: INTERNAL MEDICINE

## 2018-04-27 PROCEDURE — 84484 ASSAY OF TROPONIN QUANT: CPT | Mod: 91

## 2018-04-27 PROCEDURE — 63600175 PHARM REV CODE 636 W HCPCS: Performed by: STUDENT IN AN ORGANIZED HEALTH CARE EDUCATION/TRAINING PROGRAM

## 2018-04-27 PROCEDURE — 96375 TX/PRO/DX INJ NEW DRUG ADDON: CPT

## 2018-04-27 RX ORDER — GLUCAGON 1 MG
1 KIT INJECTION
Status: DISCONTINUED | OUTPATIENT
Start: 2018-04-27 | End: 2018-05-02 | Stop reason: HOSPADM

## 2018-04-27 RX ORDER — IBUPROFEN 200 MG
24 TABLET ORAL
Status: DISCONTINUED | OUTPATIENT
Start: 2018-04-27 | End: 2018-05-02 | Stop reason: HOSPADM

## 2018-04-27 RX ORDER — AMLODIPINE BESYLATE 5 MG/1
5 TABLET ORAL DAILY
Status: DISCONTINUED | OUTPATIENT
Start: 2018-04-27 | End: 2018-05-02 | Stop reason: HOSPADM

## 2018-04-27 RX ORDER — PREDNISONE 20 MG/1
60 TABLET ORAL DAILY
Status: DISCONTINUED | OUTPATIENT
Start: 2018-04-27 | End: 2018-04-30

## 2018-04-27 RX ORDER — DRONABINOL 5 MG/1
5 CAPSULE ORAL
Status: DISCONTINUED | OUTPATIENT
Start: 2018-04-27 | End: 2018-05-02 | Stop reason: HOSPADM

## 2018-04-27 RX ORDER — CEFEPIME HYDROCHLORIDE 1 G/1
1 INJECTION, POWDER, FOR SOLUTION INTRAMUSCULAR; INTRAVENOUS
Status: DISCONTINUED | OUTPATIENT
Start: 2018-04-27 | End: 2018-04-29

## 2018-04-27 RX ORDER — SODIUM CHLORIDE 0.9 % (FLUSH) 0.9 %
5 SYRINGE (ML) INJECTION
Status: DISCONTINUED | OUTPATIENT
Start: 2018-04-27 | End: 2018-05-02 | Stop reason: HOSPADM

## 2018-04-27 RX ORDER — LACTULOSE 10 G/15ML
10 SOLUTION ORAL
Status: DISPENSED | OUTPATIENT
Start: 2018-04-27 | End: 2018-04-27

## 2018-04-27 RX ORDER — ONDANSETRON 8 MG/1
8 TABLET, ORALLY DISINTEGRATING ORAL EVERY 6 HOURS PRN
Status: DISCONTINUED | OUTPATIENT
Start: 2018-04-27 | End: 2018-04-27

## 2018-04-27 RX ORDER — LORAZEPAM 0.5 MG/1
1 TABLET ORAL 2 TIMES DAILY PRN
Status: DISCONTINUED | OUTPATIENT
Start: 2018-04-27 | End: 2018-05-02 | Stop reason: HOSPADM

## 2018-04-27 RX ORDER — IPRATROPIUM BROMIDE AND ALBUTEROL SULFATE 2.5; .5 MG/3ML; MG/3ML
3 SOLUTION RESPIRATORY (INHALATION) EVERY 4 HOURS PRN
Status: DISCONTINUED | OUTPATIENT
Start: 2018-04-27 | End: 2018-05-02 | Stop reason: HOSPADM

## 2018-04-27 RX ORDER — LEVETIRACETAM 750 MG/1
750 TABLET ORAL 2 TIMES DAILY
Status: DISCONTINUED | OUTPATIENT
Start: 2018-04-27 | End: 2018-05-02 | Stop reason: HOSPADM

## 2018-04-27 RX ORDER — FUROSEMIDE 10 MG/ML
20 INJECTION INTRAMUSCULAR; INTRAVENOUS ONCE
Status: COMPLETED | OUTPATIENT
Start: 2018-04-27 | End: 2018-04-27

## 2018-04-27 RX ORDER — ACETAMINOPHEN 325 MG/1
650 TABLET ORAL EVERY 6 HOURS PRN
Status: DISCONTINUED | OUTPATIENT
Start: 2018-04-27 | End: 2018-05-02 | Stop reason: HOSPADM

## 2018-04-27 RX ORDER — ATORVASTATIN CALCIUM 20 MG/1
40 TABLET, FILM COATED ORAL DAILY
Status: DISCONTINUED | OUTPATIENT
Start: 2018-04-27 | End: 2018-05-02 | Stop reason: HOSPADM

## 2018-04-27 RX ORDER — LORAZEPAM 1 MG/1
1 TABLET ORAL 2 TIMES DAILY
Status: DISCONTINUED | OUTPATIENT
Start: 2018-04-27 | End: 2018-04-27

## 2018-04-27 RX ORDER — ONDANSETRON 8 MG/1
8 TABLET, ORALLY DISINTEGRATING ORAL EVERY 8 HOURS PRN
Status: DISCONTINUED | OUTPATIENT
Start: 2018-04-27 | End: 2018-05-02 | Stop reason: HOSPADM

## 2018-04-27 RX ORDER — IBUPROFEN 200 MG
16 TABLET ORAL
Status: DISCONTINUED | OUTPATIENT
Start: 2018-04-27 | End: 2018-05-02 | Stop reason: HOSPADM

## 2018-04-27 RX ORDER — SODIUM BICARBONATE 650 MG/1
650 TABLET ORAL 3 TIMES DAILY
Status: DISCONTINUED | OUTPATIENT
Start: 2018-04-27 | End: 2018-05-02 | Stop reason: HOSPADM

## 2018-04-27 RX ADMIN — PANCRELIPASE 1 CAPSULE: 60000; 12000; 38000 CAPSULE, DELAYED RELEASE PELLETS ORAL at 08:04

## 2018-04-27 RX ADMIN — THERA TABS 1 TABLET: TAB at 09:04

## 2018-04-27 RX ADMIN — CEFEPIME 1 G: 1 INJECTION, POWDER, FOR SOLUTION INTRAMUSCULAR; INTRAVENOUS at 06:04

## 2018-04-27 RX ADMIN — SODIUM CHLORIDE 500 ML: 9 INJECTION, SOLUTION INTRAVENOUS at 04:04

## 2018-04-27 RX ADMIN — ATORVASTATIN CALCIUM 40 MG: 20 TABLET, FILM COATED ORAL at 09:04

## 2018-04-27 RX ADMIN — FUROSEMIDE 20 MG: 10 INJECTION, SOLUTION INTRAMUSCULAR; INTRAVENOUS at 06:04

## 2018-04-27 RX ADMIN — DRONABINOL 5 MG: 5 CAPSULE ORAL at 06:04

## 2018-04-27 RX ADMIN — LEVETIRACETAM 750 MG: 750 TABLET ORAL at 09:04

## 2018-04-27 RX ADMIN — APIXABAN 5 MG: 5 TABLET, FILM COATED ORAL at 09:04

## 2018-04-27 RX ADMIN — PREDNISONE 60 MG: 20 TABLET ORAL at 09:04

## 2018-04-27 RX ADMIN — PANCRELIPASE 1 CAPSULE: 60000; 12000; 38000 CAPSULE, DELAYED RELEASE PELLETS ORAL at 11:04

## 2018-04-27 RX ADMIN — SODIUM BICARBONATE 650 MG TABLET 650 MG: at 09:04

## 2018-04-27 RX ADMIN — PANCRELIPASE 1 CAPSULE: 60000; 12000; 38000 CAPSULE, DELAYED RELEASE PELLETS ORAL at 07:04

## 2018-04-27 RX ADMIN — DRONABINOL 5 MG: 5 CAPSULE ORAL at 08:04

## 2018-04-27 RX ADMIN — AMLODIPINE BESYLATE 5 MG: 5 TABLET ORAL at 09:04

## 2018-04-27 RX ADMIN — LEVETIRACETAM 750 MG: 750 TABLET ORAL at 10:04

## 2018-04-27 NOTE — PROGRESS NOTES
Admit Assessment    Patient Identification  Naty St   :  1945  Admit Date:  2018  Attending Provider:  Troy East MD              Referral:   Patient was admitted to Medical Oncology Service with a diagnosis of Lung Cancer, and she was admitted this hospital stay due to progressive weakness, some confusion and anorexia at home, SOB and DELA CRUZ, hyperkalemia.  Shortness of breath [R06.02]  Dyspnea [R06.00]  Tachycardia [R00.0].  Additional oncologic and medical history noted in H&P, in chart.    Oncology Social Worker is involved. Patient is known to this Oncology Social Worker from previous hospital stay and clinic. Patient was referred to the Social Work Department via admission list/census.  Patient presents as a 72 y.o. year old, ,  female.    Patient seen this morning in the ED during rounds with Dr. East's Medical Oncology Service. Patient was alert, oriented, cooperative.    Living Situation:    Lives with:  Troy tS ([phone] cell).  Resides at [address] [place] [address].  Phone: [phone] (home).       Alternate/Emergency Contact:  Son, Basil Lopez, [phone] (cell).     Prior to her last hospital stay, patient was ambulatory and able to perform ADLs; she used a straight cane and commode frame over the commode in the bathroom; her  assists as needed.  She has had a decline in status since last hospital discharge and needs help with all ADLs at present.      Patient has three sons and one daughter from previous marriage. Two sons reside locally, one son in [facility], and daughter in [facility].        Current or Past Agencies and Description of Services/Supplies    DME  Agency Name: [facility]  Agency Phone Number: [phone]  Equipment Currently Used at Home: Oxygen, bath bench, wheelchair, standard walker, raised toilet, straight cane.  During last hospital stay, patient's  reported that he thought  something was missing from the bath bench and it is not stable enough to use; he also reported that there are corroded areas on the commode frame.  Home oxygen was ordered at last hospital discharge.    Home Health  Agency Name: [facility]  Agency Phone Number: [phone]  Services: Skilled nursing, physical and occupation therapy.     IV Infusion  None.    Nutrition: Oral diet. Patient with loss of appetite and poor intake.    Outpatient Pharmacy:     [facility] - [place], [place] - [address] [facility]  [address] [facility] [address]  Phone: [phone] Fax: [phone]    [facility] [address] [facility] [address]  Phone: [phone] Fax: [phone]    [facility] [address] [facility]  [address] [facility] [address]  Phone: [phone] Fax: [phone]      Patient Preference of agencies include: as listed above.     Patient/Caregiver informed of right to choose providers or agencies.  Patient provides permission to release any necessary information to [facility] and to Non[facility] agencies as needed to facilitate patient care, treatment planning, and patient discharge planning.  Written and verbal resources will be provided as needed/requested..      Coping  Appropriate to situation.  Patient has good family support.       Adjustment to Diagnosis and Treatment  Ongoing process.      Emotional/Behavioral/Cognitive Issues  Some confusion reported by patient's son.         History/Current Symptoms of Anxiety/Depression: Yes - see H&P.      History/Current Substance Use:   Social History     Social History Main Topics    Smoking status: Former Smoker     Packs/day: 0.00     Years: 0.00     Quit date: 9/26/1961    Smokeless tobacco: Never Used    Alcohol use No    Drug use: No    Sexual activity: Yes     Partners: Male       Indications of Abuse/Neglect: No        Financial:  Payor/Plan Subscr  Sex Relation Sub. Ins. ID Effective Group Num   1. [employer] MANAGE* JANE HAYES* 1945 Female  [id number]                                   [address] [address]      Patient is retired. She has a [employer] Medicare HMO insurance plan              Other identified concerns/needs: PT/OT; SNF consult.    Plan: D/c from Acute when medically stable, and admit to [facility] for a short stay for daily PT and OT services before returning home with home health.    Interventions/Referrals:     Patient/caregiver engaged in treatment planning process.    Oncology Social Worker providing psychosocial and supportive counseling, resources, education, assistance and discharge planning as appropriate.  Patient/caregiver state understanding of  available resources,  following, remains available. They have contact information for Oncology Social Worker.    Will continue to follow.

## 2018-04-27 NOTE — LETTER
April 27, 2018    Naty St  [address] [facility] [place] [address]             [facility]  [address] [facility] [place] [address] Dear Mrs. St,    I work with [facility] Outpatient Case Management Department. We received a referral to call you to discuss your medical history.These services are free of charge and are offered to [facility] patients who have recently been discharged from any of our facilities or who have complex medical conditions that may require the skill of a nurse to assist with management.             I am a Registered Nurse who specializes in connecting patients with available medical and financial resources as well as addressing any educational needs that may be indicated.      I attempted to reach you by telephone, but I was unsuccessful. Please call our department so that we can go over some questions with you regarding your health.    The Outpatient Case Management Department can be reached at [phone] from 8:00AM to 4:30 PM on Monday thru Friday. [facility] also has a program where a nurse is available 24/7 to answer questions or provide medical advice, their number is [phone].    Thanks,      Maryann Borden, RN  Outpatient Complex Case Management   [phone]  }

## 2018-04-27 NOTE — ASSESSMENT & PLAN NOTE
- son reports some difficulty with word finding after stroke but has had much more difficulty in past week  - CT head to r/o stroke, new brain mets  - UA dirty, UTI possibly causing entire picture  - start cefepime

## 2018-04-27 NOTE — HPI
The patient is a 72 y.o. female with lung cancer, HTN, breast cancer,HFpEF, and stroke in October 2017 who presents to the ED with a complaint of fatigue and worsening DELA CRUZ.  Reports that she has not been able to ambulate secondary to fatigue for the past week.  patient states she is able to stand but is too weak/fatigued to actually walk. Denies focal deficits. Son notes appetite has been decreased, but pt ate well tonight.  Pt diagnosed PNA 4/9 and was discharged on 4/16 on 3L oxygen.  She is on blood thinners due to blood clot two years ago in her LLE.  Son also notes slower cognitive function and states that she asked what her 's name was earlier today, which is not normal for her. Her DELA CRUZ has also been worsening for a week. She becomes very dyspneic with minimal movement in bed. She denies fevers and chills at home, dysuria, diarrhea.    Oncologic History:  Ms. Naty St was admitted to the hospital between 01/25/2016 and 01/28/2016. She has a history of pancreatic cancer 17 years ago, breast cancer and meningioma, presented to the hospital complaining of loss of consciousness. The patient apparently was in her normal state of health and she stood up to go to the bathroom and fell to the floor. The patient's daughter helped the mother up in the bathroom and noted that her mother's upper extremities were shaking and eye rolling. No reports of bowel or bladder incontinence, tongue biting or rolling. The second episode lasted about four minutes and she was extremely lethargic following that. Apparently, the patient had a meningioma resection done in the past; however, recently, underwent neurosurgical resection with Dr. 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    Additional immunohistochemical stains were performed which revealed the tumor cells to be are positive for TTF1 and negative for  "ER and GCDFP. The morphology and TTF1 positivity are most consistent with lung primary.    Of note, imaging scan at the end of January 2016 revealed a mass in the lung at 2 cm in the medial aspect of the apical segment of the right upper lobe abutting the mediastinum at the level of the azygous vein and abutting and possibly encasing the segmental bronchi and vessels of the apical segment of the right upper lobe and no pleural fluid was present. Also, there is an enlarged right paratracheal lymph node. No evidence of any metastatic disease at the pancreatic site with postoperative changes post Whipple disease  Her PET Scan from 2/15/16 reveal "Hypermetabolic mass in the right lung apex consistent with a primary malignancy. Hypermetabolic mediastinal lymph nodes consistent with metastatic disease. Right sacral hypermetabolic lesion consistent with metastatic disease, noting additional mildly sclerotic lesions in multiple vertebral bodies which do not demonstrate abnormal hypermetabolism  She underwent IR bone biopsy which revealed metastatic adenocarcinoma of lung origin. 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  She has completed focal RT to brain lesions in March 2016.    PET scan from 6/6/16 shows "Dramatic almost complete response to therapy."  Lovenox started after US lower ext 6/7/16 shows Acute complete occlusion of one of the left posterior tibial vein.Remote partial thrombus of the proximal left superficial femoral vein.  She is on Tarceva.   12/6/16 PET scan reveals "Stable right upper lobe lesion and right hilar lymph node. No new lesions identified. Trace left pleural effusion".  1/27/17 Renal u/s "Medical renal disease. Nonobstructive right nephrolithiasis"  1/31/17 PET - "Right upper lobe nodule and right hilar lymph node similar and very low grade activity.  There is no definite evidence of recurrence."   11/9/17 PET "In this patient with history of lung cancer, there is interval increase in size and " "hypermetabolism of a right upper lobe lung lesion concerning for recurrent disease. Additionally, there is interval appearance of a hypermetabolic paratracheal lymph node suspicious for metastatic disease"         She progressed on Tarceva She underwent EBUS on 12/5/17. Pathology revealed adenocarcinoma but T790m could not be done as quantity was not insufficient. Her PET scan from 1/30/18 revealed The patient's previously identified abnormal lesions is slightly greater uptake of FDG on today's study compared with prior exam. These findings are concerning for progression of disease"  "

## 2018-04-27 NOTE — ED TRIAGE NOTES
72 yr old female pt presents to the ed with complaints of sob and fatigue x 1 week .pt is currently diagnosed  With lung cancer and is undergoing oral chemo trx .pt is aox 4. Pt denies chest pain sob or nausea. Pt presents to the ed ems.

## 2018-04-27 NOTE — ED NOTES
Report received from ROSA Perales. Care assumed. Pt resting comfortably and independently repositioned in stretcher with bed locked in lowest position for safety. NAD noted at this time. Respirations even and unlabored and visible chest rise noted.  Patient offered bathroom assistance and denies need at this time. Pt instructed to call if assistance is needed. Pt on continuous cardiac, BP, and O2 monitoring. Call light within reach. Family at bedside. No needs at this time. Will continue to monitor.

## 2018-04-27 NOTE — PROGRESS NOTES
Attended rounds on patient this morning with Dr. East's Medical Oncology team. Patient seen in the ED; she is being admitted and awaiting room assignment. Discussed patient going to [facility] for a short stay once she is medically stable to be d/c'ed from Acute. Patient's son Basil had left a VM message for me last night at 7:44 PM. Returned call to him at [phone] and left a VM message for him this afternoon. Will continue to follow.   Received return call from patient's son, who is at work right now. He will be off this weekend and plans to stay with patient in the hospital. Advised him to speak with nursing staff and MDs for updates on patient's status and test results.    Will follow up on Monday.

## 2018-04-27 NOTE — ASSESSMENT & PLAN NOTE
- unclear cause at this time. DDx includes worsening lung cancer, PNA, volume overload, PE, or combo  - CXR with worsening bilateral infiltrates, however BNP wnl  - lasix 20 mg IV once  - duonebs prn  - CT chest ordered to better assess lung parenchyma  - placed on cefepime, de-escalate as studies result

## 2018-04-27 NOTE — ED NOTES
Pt resting in bed. She denies complaints and is AAO, Resps appear even and mildly labored. Pt assisted on and off of bedpan. HOB elevated. Will continue to monitor.

## 2018-04-27 NOTE — ASSESSMENT & PLAN NOTE
- unclear cause as patients kidney function appears to be at baseline. No EKG changes  - administered lactulose in ED  - will give one time dose lasix 20 mg IV  - repeat BMP around noon

## 2018-04-27 NOTE — SUBJECTIVE & OBJECTIVE
"Oncology Treatment Plan:   [No treatment plan]    Medications:  Continuous Infusions:  Scheduled Meds:   apixaban  5 mg Oral BID    atorvastatin  40 mg Oral Daily    ceFEPime (MAXIPIME) IVPB  1 g Intravenous Q24H    dronabinol  5 mg Oral BID AC    lactulose  10 g Oral ED 1 Time    levETIRAcetam  750 mg Oral BID    lipase-protease-amylase 12,000-38,000-60,000 units  1 capsule Oral TID WM    LORazepam  1 mg Oral BID    multivitamin  1 tablet Oral Daily    sodium bicarbonate  650 mg Oral TID     PRN Meds:acetaminophen, albuterol-ipratropium 2.5mg-0.5mg/3mL, dextrose 50%, dextrose 50%, glucagon (human recombinant), glucose, glucose, ondansetron, sodium chloride 0.9%     Review of patient's allergies indicates:   Allergen Reactions    Tobradex [tobramycin-dexamethasone] Swelling    Iodinated contrast- oral and iv dye Hives    Morphine Other (See Comments)     "shaking" and tremors    Latex Rash    Phenytoin sodium extended Other (See Comments) and Rash        Past Medical History:   Diagnosis Date    Anticoagulant long-term use     Blood clot in vein 06/2016    Breast cancer 1994    Cataract     Hypertension     Pancreatic cancer 1998    Posterior capsular opacification, left eye     Psychiatric problem     Seizures 01/25/2016    Stroke     Therapy      Past Surgical History:   Procedure Laterality Date    BONE BIOSPY  3/9/16    BRAIN SURGERY  1/12/16    tumor removal 1/12/16    BREAST LUMPECTOMY  1998    left    CATARACT EXTRACTION Bilateral 2004    yag OU    CHOLECYSTECTOMY  1998    COLONOSCOPY N/A 11/13/2015    Procedure: COLONOSCOPY;  Surgeon: Alex Carmona MD;  Location: [address]);  Service: Endoscopy;  Laterality: N/A;  2 year f/u    cysto and right ureteral stent  4/27/12    ecoli  2010    removal of blockage, done throat, then through the liver.    HYSTERECTOMY  1974    KIDNEY STONE SURGERY  2010--laser    left eswl  6/20/12    OOPHORECTOMY  4/25/2012    " Laparoscopic BSO, lysis of adhesions, cystoscopy greater than 35mins     WHIPPLE PROCEDURE W/ LAPAROSCOPY  1998     Family History     Problem Relation (Age of Onset)    Breast cancer Mother    Leukemia Paternal Grandfather    Lung cancer Mother    Stomach cancer Paternal Grandmother        Social History Main Topics    Smoking status: Former Smoker     Packs/day: 0.00     Years: 0.00     Quit date: 9/26/1961    Smokeless tobacco: Never Used    Alcohol use No    Drug use: No    Sexual activity: Yes     Partners: Male       Review of Systems   Constitutional: Positive for fatigue. Negative for chills and fever.   Respiratory: Positive for cough and shortness of breath. Negative for chest tightness.    Cardiovascular: Positive for leg swelling. Negative for chest pain.   Gastrointestinal: Negative for abdominal pain, constipation, diarrhea and vomiting.   Genitourinary: Negative for difficulty urinating and dysuria.   Skin: Positive for color change and rash.   Neurological: Positive for weakness and light-headedness. Negative for syncope.     Objective:     Vital Signs (Most Recent):  Temp: 98.7 °F (37.1 °C) (04/26/18 2316)  Pulse: 94 (04/27/18 0345)  Resp: 18 (04/27/18 0345)  BP: 110/68 (04/27/18 0345)  SpO2: (!) 92 % (04/27/18 0453) Vital Signs (24h Range):  Temp:  [98.7 °F (37.1 °C)] 98.7 °F (37.1 °C)  Pulse:  [] 94  Resp:  [18] 18  SpO2:  [92 %-97 %] 92 %  BP: (103-110)/(64-68) 110/68     Weight: 67.3 kg (148 lb 5.9 oz)  Body mass index is 23.95 kg/m².  Body surface area is 1.77 meters squared.    No intake or output data in the 24 hours ending 04/27/18 0528    Physical Exam   Constitutional: She is oriented to person, place, and time. She appears well-developed and well-nourished. No distress.   HENT:   Head: Normocephalic and atraumatic.   Eyes: Conjunctivae are normal.   Neck: Normal range of motion. Neck supple.   Cardiovascular: Normal rate and regular rhythm.    Pulmonary/Chest: She has  decreased breath sounds. She has no wheezes. She has rales.   Coarse breath sounds    Abdominal: Soft. Bowel sounds are normal. She exhibits no distension. There is no tenderness.   Musculoskeletal: She exhibits edema. She exhibits no tenderness.   Neurological: She is alert and oriented to person, place, and time.   No gross focal deficits   Skin: Skin is warm and dry. She is not diaphoretic. There is erythema. There is pallor.   Vitals reviewed.      Significant Labs:   All pertinent labs from the last 24 hours have been reviewed.    Diagnostic Results:  I have reviewed all pertinent imaging results/findings within the past 24 hours.

## 2018-04-27 NOTE — ASSESSMENT & PLAN NOTE
- patient has had an acute on chronic worsening of weakness and debility in the last week.   - CT head to r/o stroke  - possibly related to UTI  - PT/OT  - recommend GOC

## 2018-04-27 NOTE — H&P
[facility]  Hematology/Oncology  H&P    Patient Name: Naty St  MRN: [id number]  Admission Date: 4/27/2018  Code Status: Full Code   Attending Provider: No att. providers found  Primary Care Physician: Olvin Mars MD  Principal Problem:<principal problem not specified>    Subjective:     HPI: The patient is a 72 y.o. female with lung cancer, HTN, breast cancer,HFpEF, and stroke in October 2017 who presents to the ED with a complaint of fatigue and worsening DELA CRUZ.  Reports that she has not been able to ambulate secondary to fatigue for the past week.   patient states she is able to stand but is too weak/fatigued to actually walk. Denies focal deficits. Son notes appetite has been decreased, but pt ate well tonight.  Pt diagnosed PNA 4/9 and was discharged on 4/16 on 3L oxygen.  She is on blood thinners due to blood clot two years ago in her LLE.  Son also notes slower cognitive function and states that she asked what her 's name was earlier today, which is not normal for her. Her DELA CRUZ has also been worsening for a week. She becomes very dyspneic with minimal movement in bed. She denies fevers and chills at home, dysuria, diarrhea.    Oncologic History:  Ms. Naty St was admitted to the hospital between 01/25/2016 and 01/28/2016. She has a history of pancreatic cancer 17 years ago, breast cancer and meningioma, presented to the hospital complaining of loss of consciousness. The patient apparently was in her normal state of health and she stood up to go to the bathroom and fell to the floor. The patient's daughter helped the mother up in the bathroom and noted that her mother's upper extremities were shaking and eye rolling. No reports of bowel or bladder incontinence, tongue biting or rolling. The second episode lasted about four minutes and she was extremely lethargic following that. Apparently, the patient had a meningioma resection done in the past; however, recently,  "underwent neurosurgical resection with Dr. 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    Additional immunohistochemical stains were performed which revealed the tumor cells to be are positive for TTF1 and negative for ER and GCDFP. The morphology and TTF1 positivity are most consistent with lung primary.    Of note, imaging scan at the end of January 2016 revealed a mass in the lung at 2 cm in the medial aspect of the apical segment of the right upper lobe abutting the mediastinum at the level of the azygous vein and abutting and possibly encasing the segmental bronchi and vessels of the apical segment of the right upper lobe and no pleural fluid was present. Also, there is an enlarged right paratracheal lymph node. No evidence of any metastatic disease at the pancreatic site with postoperative changes post Whipple disease  Her PET Scan from 2/15/16 reveal "Hypermetabolic mass in the right lung apex consistent with a primary malignancy. Hypermetabolic mediastinal lymph nodes consistent with metastatic disease. Right sacral hypermetabolic lesion consistent with metastatic disease, noting additional mildly sclerotic lesions in multiple vertebral bodies which do not demonstrate abnormal hypermetabolism  She underwent IR bone biopsy which revealed metastatic adenocarcinoma of lung origin. 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  She has completed focal RT to brain lesions in March 2016.    PET scan from 6/6/16 shows "Dramatic almost complete response to therapy."  Lovenox started after US lower ext 6/7/16 shows Acute complete occlusion of one of the left posterior tibial vein.Remote partial thrombus of the proximal left superficial femoral vein.  She is on Tarceva.   12/6/16 PET scan reveals "Stable right upper lobe lesion and right hilar lymph node. No new lesions identified. Trace left pleural " "effusion".  1/27/17 Renal u/s "Medical renal disease. Nonobstructive right nephrolithiasis"  1/31/17 PET - "Right upper lobe nodule and right hilar lymph node similar and very low grade activity.  There is no definite evidence of recurrence."   11/9/17 PET "In this patient with history of lung cancer, there is interval increase in size and hypermetabolism of a right upper lobe lung lesion concerning for recurrent disease. Additionally, there is interval appearance of a hypermetabolic paratracheal lymph node suspicious for metastatic disease"         She progressed on Tarceva She underwent EBUS on 12/5/17. Pathology revealed adenocarcinoma but T790m could not be done as quantity was not insufficient. Her PET scan from 1/30/18 revealed The patient's previously identified abnormal lesions is slightly greater uptake of FDG on today's study compared with prior exam. These findings are concerning for progression of disease"     Oncology Treatment Plan:   [No treatment plan]    Medications:  Continuous Infusions:  Scheduled Meds:   apixaban  5 mg Oral BID    atorvastatin  40 mg Oral Daily    ceFEPime (MAXIPIME) IVPB  1 g Intravenous Q24H    dronabinol  5 mg Oral BID AC    lactulose  10 g Oral ED 1 Time    levETIRAcetam  750 mg Oral BID    lipase-protease-amylase 12,000-38,000-60,000 units  1 capsule Oral TID WM    LORazepam  1 mg Oral BID    multivitamin  1 tablet Oral Daily    sodium bicarbonate  650 mg Oral TID     PRN Meds:acetaminophen, albuterol-ipratropium 2.5mg-0.5mg/3mL, dextrose 50%, dextrose 50%, glucagon (human recombinant), glucose, glucose, ondansetron, sodium chloride 0.9%     Review of patient's allergies indicates:   Allergen Reactions    Tobradex [tobramycin-dexamethasone] Swelling    Iodinated contrast- oral and iv dye Hives    Morphine Other (See Comments)     "shaking" and tremors    Latex Rash    Phenytoin sodium extended Other (See Comments) and Rash        Past Medical History: "   Diagnosis Date    Anticoagulant long-term use     Blood clot in vein 06/2016    Breast cancer 1994    Cataract     Hypertension     Pancreatic cancer 1998    Posterior capsular opacification, left eye     Psychiatric problem     Seizures 01/25/2016    Stroke     Therapy      Past Surgical History:   Procedure Laterality Date    BONE BIOSPY  3/9/16    BRAIN SURGERY  1/12/16    tumor removal 1/12/16    BREAST LUMPECTOMY  1998    left    CATARACT EXTRACTION Bilateral 2004    yag OU    CHOLECYSTECTOMY  1998    COLONOSCOPY N/A 11/13/2015    Procedure: COLONOSCOPY;  Surgeon: Alex Carmona MD;  Location: [facility] ([address] [place] [address]);  Service: Endoscopy;  Laterality: N/A;  2 year f/u    cysto and right ureteral stent  4/27/12    ecoli  2010    removal of blockage, done throat, then through the liver.    HYSTERECTOMY  1974    KIDNEY STONE SURGERY  2010--laser    left eswl  6/20/12    OOPHORECTOMY  4/25/2012    Laparoscopic BSO, lysis of adhesions, cystoscopy greater than 35mins     WHIPPLE PROCEDURE W/ LAPAROSCOPY  1998     Family History     Problem Relation (Age of Onset)    Breast cancer Mother    Leukemia Paternal Grandfather    Lung cancer Mother    Stomach cancer Paternal Grandmother        Social History Main Topics    Smoking status: Former Smoker     Packs/day: 0.00     Years: 0.00     Quit date: 9/26/1961    Smokeless tobacco: Never Used    Alcohol use No    Drug use: No    Sexual activity: Yes     Partners: Male       Review of Systems   Constitutional: Positive for fatigue. Negative for chills and fever.   Respiratory: Positive for cough and shortness of breath. Negative for chest tightness.    Cardiovascular: Positive for leg swelling. Negative for chest pain.   Gastrointestinal: Negative for abdominal pain, constipation, diarrhea and vomiting.   Genitourinary: Negative for difficulty urinating and dysuria.   Skin: Positive for color change and rash.   Neurological: Positive for  weakness and light-headedness. Negative for syncope.     Objective:     Vital Signs (Most Recent):  Temp: 98.7 °F (37.1 °C) (04/26/18 2316)  Pulse: 94 (04/27/18 0345)  Resp: 18 (04/27/18 0345)  BP: 110/68 (04/27/18 0345)  SpO2: (!) 92 % (04/27/18 0453) Vital Signs (24h Range):  Temp:  [98.7 °F (37.1 °C)] 98.7 °F (37.1 °C)  Pulse:  [] 94  Resp:  [18] 18  SpO2:  [92 %-97 %] 92 %  BP: (103-110)/(64-68) 110/68     Weight: 67.3 kg (148 lb 5.9 oz)  Body mass index is 23.95 kg/m².  Body surface area is 1.77 meters squared.    No intake or output data in the 24 hours ending 04/27/18 0528    Physical Exam   Constitutional: She is oriented to person, place, and time. She appears well-developed and well-nourished. No distress.   HENT:   Head: Normocephalic and atraumatic.   Eyes: Conjunctivae are normal.   Neck: Normal range of motion. Neck supple.   Cardiovascular: Normal rate and regular rhythm.    Pulmonary/Chest: She has decreased breath sounds. She has no wheezes. She has rales.   Coarse breath sounds    Abdominal: Soft. Bowel sounds are normal. She exhibits no distension. There is no tenderness.   Musculoskeletal: She exhibits edema. She exhibits no tenderness.   Neurological: She is alert and oriented to person, place, and time.   No gross focal deficits   Skin: Skin is warm and dry. She is not diaphoretic. There is erythema. There is pallor.   Vitals reviewed.      Significant Labs:   All pertinent labs from the last 24 hours have been reviewed.    Diagnostic Results:  I have reviewed all pertinent imaging results/findings within the past 24 hours.    Assessment/Plan:     Generalized weakness    - patient has had an acute on chronic worsening of weakness and debility in the last week.   - CT head to r/o stroke  - possibly related to UTI  - PT/OT  - recommend GOC        Metabolic encephalopathy    - son reports some difficulty with word finding after stroke but has had much more difficulty in past week  - CT head  to r/o stroke, new brain mets  - UA dirty, UTI possibly causing entire picture  - start cefepime          Hyperkalemia    - unclear cause as patients kidney function appears to be at baseline. No EKG changes  - administered lactulose in ED  - will give one time dose lasix 20 mg IV  - repeat BMP around noon        Acute on chronic respiratory failure with hypoxia    - unclear cause at this time. DDx includes worsening lung cancer, PNA, volume overload, PE, or combo  - CXR with worsening bilateral infiltrates, however BNP wnl  - lasix 20 mg IV once  - duonebs prn  - CT chest ordered to better assess lung parenchyma  - placed on cefepime, de-escalate as studies result        Debility    PT/OT        UTI (urinary tract infection)    - urine cx pending  - cefepime 1g daily        History of stroke    - cont atorvastatin        Seizure disorder    - cont home dose keppra        Anticoagulant long-term use    Patient on apixiban for DVT diagnosed two years ago in her LLE  Consider switching to lovenox in setting of cancer        CKD (chronic kidney disease) stage 3, GFR 30-59 ml/min    - at baseline cr        Essential hypertension    - holding home meds  -restart prn            Michelle Maria MD  Hematology/Oncology  [facility]

## 2018-04-27 NOTE — PROGRESS NOTES
-- 2nd attempt to complete initial assessment for OPCM, pt currently in ED due to fatigue. As this is my second unsuccessful attempt to complete initial assessment for OPCM, I will mail a letter with my contact information. Loli LEE-OPCM

## 2018-04-27 NOTE — ED PROVIDER NOTES
"Encounter Date: 4/26/2018    SCRIBE #1 NOTE: I, Dominique Louie, am scribing for, and in the presence of,  Dr. Martin. I have scribed the entire note.       History     Chief Complaint   Patient presents with    Fatigue     for 4 days. Has small cell lung carcinoma with mets to lungs and brain.      Time patient was seen by the provider: 1:13 AM      The patient is a 72 y.o. female with co-morbidities including: HTN, breast cancer, pancreatic cancer, and stroke who presents to the ED with a complaint of fatigue.  Reports that she has not been able to ambulate secondary to fatigue for the past week.  Denies feeling tired.  Son notes appetite has been decreased, but pt ate well tonight.  Pt diagnosed PNA 4/9 and was discharged on 4/16.  Pt now on oxygen.  For the past few days notes that pt has had several falls, which is new for her.  Son also notes rash on her neck.  Denies any new soap use.  She also notes rash to lower extremities bilaterally.  Pt with history stroke in October 2017.  She is on blood thinners due to blood clot two years ago in her LLE.  Denies sick contact, emesis, or trouble urinating.  Son also notes slower cognitive function and states that she asked what her 's name was earlier today, which is not normal for her.  Denies confusion.  Pt with small cell lung carcinoma with metastases to the lungs and brain.  Pt takes chemo pill Tagrisso every day. Last normal BM was today.  Also notes history of anemia.      The history is provided by the patient, a relative and medical records.     Review of patient's allergies indicates:   Allergen Reactions    Tobradex [tobramycin-dexamethasone] Swelling    Iodinated contrast- oral and iv dye Hives    Morphine Other (See Comments)     "shaking" and tremors    Latex Rash    Phenytoin sodium extended Other (See Comments) and Rash     Past Medical History:   Diagnosis Date    Anticoagulant long-term use     Blood clot in vein 06/2016    Breast " cancer 1994    Cataract     Hypertension     Pancreatic cancer 1998    Posterior capsular opacification, left eye     Psychiatric problem     Seizures 01/25/2016    Stroke     Therapy      Past Surgical History:   Procedure Laterality Date    BONE BIOSPY  3/9/16    BRAIN SURGERY  1/12/16    tumor removal 1/12/16    BREAST LUMPECTOMY  1998    left    CATARACT EXTRACTION Bilateral 2004    yag OU    CHOLECYSTECTOMY  1998    COLONOSCOPY N/A 11/13/2015    Procedure: COLONOSCOPY;  Surgeon: Alex Carmona MD;  Location: [facility] ([address] [place] [address]);  Service: Endoscopy;  Laterality: N/A;  2 year f/u    cysto and right ureteral stent  4/27/12    ecoli  2010    removal of blockage, done throat, then through the liver.    HYSTERECTOMY  1974    KIDNEY STONE SURGERY  2010--laser    left eswl  6/20/12    OOPHORECTOMY  4/25/2012    Laparoscopic BSO, lysis of adhesions, cystoscopy greater than 35mins     WHIPPLE PROCEDURE W/ LAPAROSCOPY  1998     Family History   Problem Relation Age of Onset    Breast cancer Mother     Lung cancer Mother     Leukemia Paternal Grandfather     Stomach cancer Paternal Grandmother      Social History   Substance Use Topics    Smoking status: Former Smoker     Packs/day: 0.00     Years: 0.00     Quit date: 9/26/1961    Smokeless tobacco: Never Used    Alcohol use No     Review of Systems   Constitutional: Positive for fatigue.   Respiratory: Positive for shortness of breath (secondary to recent PNA).    Gastrointestinal: Negative for constipation and vomiting.   Genitourinary: Negative for difficulty urinating and dysuria.   Musculoskeletal: Negative for neck pain and neck stiffness.   Skin: Positive for rash (to neck and biltaeral lower extremities).   Neurological: Positive for weakness (generalized).   Psychiatric/Behavioral: Negative for confusion.        + decreased cognitive function   All other systems reviewed and are negative.      Physical Exam     Initial Vitals  [04/26/18 2316]   BP Pulse Resp Temp SpO2   103/64 106 18 98.7 °F (37.1 °C) 96 %      MAP       77         Physical Exam    PHYSICAL EXAM:  Vital signs and nursing assessment noted:    GEN:   NAD, A & Ox3, atraumatic,  nontoxic appearing  HEENT:  PERRLA, EOMI, moist membranes, nl conjunctiva, no scleral icterus, no nystagmus, no nodes/nodules, soft, supple, FROM, no trachial deviation  CV:   RRR no m/r/g, 2+ radial pulses, <2sec cap refill, no obvious JVD  RESP:  Tachypnic, decreased breath sounds, mild respiratory distress  ABD:   soft, Nontender, Nondistended, +BS, no guarding/rebound  BACK:  FROM, no midline tenderness, no paraspinal tenderness  :   Deferred  EXT:   FROM, CORTEZ x 4, no edema, no calf tenderness, no swelling  LYMPH:  no gross adenopathy  NEURO:  CN II-XII grossly intact, no obvious motor/sensory deficit, no tremor  PSYCH:   no SI/HI, no anxiety, nl mood/affect, nl judgement/thought process  SKIN:  Warm, dry, intact, Maculopapular rash, poor skin turgor    ED Course   Procedures  Labs Reviewed   CBC W/ AUTO DIFFERENTIAL - Abnormal; Notable for the following:        Result Value    WBC 12.75 (*)     Hemoglobin 10.6 (*)     Hematocrit 34.3 (*)     MCH 26.2 (*)     MCHC 30.9 (*)     RDW 18.4 (*)     Immature Granulocytes 0.7 (*)     Gran # (ANC) 9.0 (*)     Immature Grans (Abs) 0.09 (*)     Lymph # 0.9 (*)     Eos # 1.9 (*)     Lymph% 7.1 (*)     Eosinophil% 14.6 (*)     All other components within normal limits   BASIC METABOLIC PANEL - Abnormal; Notable for the following:     Sodium 135 (*)     Potassium 5.9 (*)     BUN, Bld 24 (*)     Creatinine 1.8 (*)     eGFR if  31.7 (*)     eGFR if non  27.5 (*)     All other components within normal limits   TROPONIN I - Abnormal; Notable for the following:     Troponin I 0.037 (*)     All other components within normal limits   URINALYSIS, REFLEX TO URINE CULTURE - Abnormal; Notable for the following:     Appearance, UA Cloudy  (*)     Occult Blood UA 1+ (*)     Leukocytes, UA 3+ (*)     All other components within normal limits    Narrative:     Preferred Collection Type->Urine, Catheterized  Received a cup of urine.   URINALYSIS MICROSCOPIC - Abnormal; Notable for the following:     RBC, UA 5 (*)     WBC, UA >100 (*)     Bacteria, UA Few (*)     Non-Squam Epith 8 (*)     All other components within normal limits    Narrative:     Preferred Collection Type->Urine, Catheterized  Received a cup of urine.   CULTURE, URINE   CULTURE, RESPIRATORY   B-TYPE NATRIURETIC PEPTIDE   LACTIC ACID, PLASMA   MAGNESIUM   URIC ACID   PROCALCITONIN   TROPONIN I   PROCALCITONIN     EKG Readings: (Independently Interpreted)   Initial Reading: No STEMI. Rhythm: Sinus Tachycardia. Heart Rate: 107. ST Segments: Normal ST Segments. T Waves: Normal.          Medical Decision Making:   History:   Old Medical Records: I decided to obtain old medical records.  Old Records Summarized: other records.       <> Summary of Records: Patient admitted to hospital for pneumonia and discharged 1 week later.  Initial Assessment:   A 72-year-old female with a past medical history of hypertension, pancreatic and breast cancer presents with generalized weakness times 1 week.   Differential Diagnosis:     Weakness differential includes but not exclusive to: anemia, metabolic disturbances, poisoning, medication side effect, UTI, myocardial infarction, dehydration, peripheral nerve disorder, malingering  Shortness of breath differential includes but not exclusive to asthma/COPD exacerbation, deconditioning, myocardial infarction, pneumonia, CHF, pneumothorax    Pulmonary embolism is considered how ever patient is currently anticoagulated on Eliquis and had negative bilateral Dopplers.  She is unable to get a CTA of the chest because of allergy to IV contrast.  Clinical Tests:   Lab Tests: Ordered and Reviewed       <> Summary of Lab: Leukocytosis, anemia  Radiological Study: Ordered  and Reviewed  Medical Tests: Ordered and Reviewed  ED Management:  Treatment plan includes physical exam, cardiac monitoring, labs, imaging studies,  and supportive care.    Patient improved after treatment and tolerating PO.    Family and patient updated on care.  Pt agrees with assessment, disposition and treatment plan and has no further questions or complaints at this time. Demonstrates understanding.    Further plan per inpatient team.  PGY-4 MDM: Patient w/ significant neoplastic disease w/ metastases presents w/ worsening fatigue and increased SOB over last few days. Noted to have increased oxygen requirements here in ED from discharge but no clear no symptom to suggest infection, blood loss, or malnutrition. Noted to be hypoxic to 88-90 despite being on 4L here w/ any amount of exertion (eg sitting up). Labs significant for worsening K 5.9 w/ stable Cr, worsening bilateral lung markings on CXR, elevated troponin likely 2nd demand ischemia. Hgb stable, WBC stable w/o fever do not suspect HCAP at this time. Given previous history of pulm HTN consider worsening HTN (unsure how it was managed at home) leading to symptoms today, chronic hypoxia may be leading to fatigue. Consideration for PE but difficult to evaluate at this time w/ CKD, allergy to dye, indeterminate V/Q scan given recent infection. Already on anticoagulation and recent negative US BLE. Given worsening of symptoms patient consulted to hem/onc. Hem/onc will evaluate patient and plan for admission.    Vera Austin MD  PGY-4 EM 4:16 AM 4/27/2018                Scribe Attestation:   Scribe #1: I performed the above scribed service and the documentation accurately describes the services I performed. I attest to the accuracy of the note.    Attending Attestation:           Physician Attestation for Scribe:      Comments: I, Dr. Lurdes Martin, personally performed the services described in this documentation. All medical record entries made by  the scribe were at my direction and in my presence.  I have reviewed the chart and agree that the record reflects my personal performance and is accurate and complete.   -Lurdes Martin MD.              ED Course as of Apr 27 0544   Fri Apr 27, 2018   0248 Elevated  WBC: (!) 12.75 [NO]   0249 anemia Hemoglobin: (!) 10.6 [NO]   0259 Chest x-ray shows interstitial lung disease that is seems to be improved airspace from chest x-ray that was done on April 10, 2018.  [NO]      ED Course User Index  [NO] Lurdes Martin MD     Clinical Impression:   Diagnoses of Tachycardia, Shortness of breath, and Dyspnea were pertinent to this visit.    Disposition:   Disposition: Placed in Observation  Condition: Stable                        Vera Austin MD  Resident  04/27/18 0544

## 2018-04-28 PROBLEM — E87.5 HYPERKALEMIA: Status: RESOLVED | Noted: 2018-04-27 | Resolved: 2018-04-28

## 2018-04-28 LAB
ALBUMIN SERPL BCP-MCNC: 2 G/DL
ALP SERPL-CCNC: 78 U/L
ALT SERPL W/O P-5'-P-CCNC: 13 U/L
ANION GAP SERPL CALC-SCNC: 7 MMOL/L
AST SERPL-CCNC: 14 U/L
BASOPHILS # BLD AUTO: 0.04 K/UL
BASOPHILS NFR BLD: 0.3 %
BILIRUB SERPL-MCNC: 0.5 MG/DL
BUN SERPL-MCNC: 27 MG/DL
CALCIUM SERPL-MCNC: 8.9 MG/DL
CHLORIDE SERPL-SCNC: 105 MMOL/L
CO2 SERPL-SCNC: 27 MMOL/L
CREAT SERPL-MCNC: 1.8 MG/DL
DIFFERENTIAL METHOD: ABNORMAL
EOSINOPHIL # BLD AUTO: 0.5 K/UL
EOSINOPHIL NFR BLD: 3.6 %
ERYTHROCYTE [DISTWIDTH] IN BLOOD BY AUTOMATED COUNT: 18.4 %
EST. GFR  (AFRICAN AMERICAN): 31.7 ML/MIN/1.73 M^2
EST. GFR  (NON AFRICAN AMERICAN): 27.5 ML/MIN/1.73 M^2
GLUCOSE SERPL-MCNC: 116 MG/DL
HCT VFR BLD AUTO: 33.2 %
HGB BLD-MCNC: 10 G/DL
IMM GRANULOCYTES # BLD AUTO: 0.14 K/UL
IMM GRANULOCYTES NFR BLD AUTO: 1 %
LYMPHOCYTES # BLD AUTO: 1.5 K/UL
LYMPHOCYTES NFR BLD: 10.5 %
MAGNESIUM SERPL-MCNC: 2.1 MG/DL
MCH RBC QN AUTO: 25.8 PG
MCHC RBC AUTO-ENTMCNC: 30.1 G/DL
MCV RBC AUTO: 86 FL
MONOCYTES # BLD AUTO: 1.1 K/UL
MONOCYTES NFR BLD: 8.1 %
NEUTROPHILS # BLD AUTO: 10.6 K/UL
NEUTROPHILS NFR BLD: 76.5 %
NRBC BLD-RTO: 0 /100 WBC
PHOSPHATE SERPL-MCNC: 3.8 MG/DL
PLATELET # BLD AUTO: 273 K/UL
PMV BLD AUTO: 9.7 FL
POTASSIUM SERPL-SCNC: 5 MMOL/L
PROT SERPL-MCNC: 5.5 G/DL
RBC # BLD AUTO: 3.87 M/UL
SODIUM SERPL-SCNC: 139 MMOL/L
WBC # BLD AUTO: 13.84 K/UL

## 2018-04-28 PROCEDURE — 99232 SBSQ HOSP IP/OBS MODERATE 35: CPT | Mod: GC,,, | Performed by: INTERNAL MEDICINE

## 2018-04-28 PROCEDURE — 20600001 HC STEP DOWN PRIVATE ROOM

## 2018-04-28 PROCEDURE — 36415 COLL VENOUS BLD VENIPUNCTURE: CPT

## 2018-04-28 PROCEDURE — 97116 GAIT TRAINING THERAPY: CPT | Performed by: PHYSICAL THERAPIST

## 2018-04-28 PROCEDURE — 97161 PT EVAL LOW COMPLEX 20 MIN: CPT | Performed by: PHYSICAL THERAPIST

## 2018-04-28 PROCEDURE — 80053 COMPREHEN METABOLIC PANEL: CPT

## 2018-04-28 PROCEDURE — 63600175 PHARM REV CODE 636 W HCPCS: Performed by: STUDENT IN AN ORGANIZED HEALTH CARE EDUCATION/TRAINING PROGRAM

## 2018-04-28 PROCEDURE — 25000003 PHARM REV CODE 250: Performed by: INTERNAL MEDICINE

## 2018-04-28 PROCEDURE — 63600175 PHARM REV CODE 636 W HCPCS: Performed by: INTERNAL MEDICINE

## 2018-04-28 PROCEDURE — 85025 COMPLETE CBC W/AUTO DIFF WBC: CPT

## 2018-04-28 PROCEDURE — 84100 ASSAY OF PHOSPHORUS: CPT

## 2018-04-28 PROCEDURE — 83735 ASSAY OF MAGNESIUM: CPT

## 2018-04-28 PROCEDURE — 99233 SBSQ HOSP IP/OBS HIGH 50: CPT | Mod: ,,, | Performed by: INTERNAL MEDICINE

## 2018-04-28 RX ORDER — FUROSEMIDE 10 MG/ML
40 INJECTION INTRAMUSCULAR; INTRAVENOUS ONCE
Status: COMPLETED | OUTPATIENT
Start: 2018-04-28 | End: 2018-04-28

## 2018-04-28 RX ADMIN — PANCRELIPASE 1 CAPSULE: 60000; 12000; 38000 CAPSULE, DELAYED RELEASE PELLETS ORAL at 04:04

## 2018-04-28 RX ADMIN — THERA TABS 1 TABLET: TAB at 09:04

## 2018-04-28 RX ADMIN — AMLODIPINE BESYLATE 5 MG: 5 TABLET ORAL at 09:04

## 2018-04-28 RX ADMIN — SODIUM BICARBONATE 650 MG TABLET 650 MG: at 08:04

## 2018-04-28 RX ADMIN — DRONABINOL 5 MG: 5 CAPSULE ORAL at 05:04

## 2018-04-28 RX ADMIN — LEVETIRACETAM 750 MG: 750 TABLET ORAL at 09:04

## 2018-04-28 RX ADMIN — SODIUM BICARBONATE 650 MG TABLET 650 MG: at 09:04

## 2018-04-28 RX ADMIN — SODIUM BICARBONATE 650 MG TABLET 650 MG: at 02:04

## 2018-04-28 RX ADMIN — PREDNISONE 60 MG: 20 TABLET ORAL at 09:04

## 2018-04-28 RX ADMIN — CEFEPIME 1 G: 1 INJECTION, POWDER, FOR SOLUTION INTRAMUSCULAR; INTRAVENOUS at 05:04

## 2018-04-28 RX ADMIN — APIXABAN 5 MG: 5 TABLET, FILM COATED ORAL at 08:04

## 2018-04-28 RX ADMIN — PANCRELIPASE 1 CAPSULE: 60000; 12000; 38000 CAPSULE, DELAYED RELEASE PELLETS ORAL at 01:04

## 2018-04-28 RX ADMIN — APIXABAN 5 MG: 5 TABLET, FILM COATED ORAL at 09:04

## 2018-04-28 RX ADMIN — FUROSEMIDE 40 MG: 10 INJECTION, SOLUTION INTRAVENOUS at 02:04

## 2018-04-28 RX ADMIN — DRONABINOL 5 MG: 5 CAPSULE ORAL at 04:04

## 2018-04-28 RX ADMIN — ATORVASTATIN CALCIUM 40 MG: 20 TABLET, FILM COATED ORAL at 09:04

## 2018-04-28 RX ADMIN — LEVETIRACETAM 750 MG: 750 TABLET ORAL at 08:04

## 2018-04-28 NOTE — ASSESSMENT & PLAN NOTE
Patient has had an acute on chronic worsening of weakness and debility in the last week.   -CT head unrevealing for acute process  -Possibly related to UTI  -PT/OT  -recommend GOC

## 2018-04-28 NOTE — PLAN OF CARE
Problem: Patient Care Overview  Goal: Plan of Care Review  Outcome: Ongoing (interventions implemented as appropriate)  Side rails up x2; call bell in place; bed in lowest, locked position; skid proof socks on; no evidence of skin breakdown; care plan explained to patient; pt remains free of injury. Pt with cardiac diet, dietary stated there would be a meal delivered at 6:15 family and pt assured. Pt voided per bed pain assistance provided by PCT. Telemetry monitoring in progress, 4 L NC in progress. VSS and afebrile. Pt and family instructed to call with any concerns or needs.

## 2018-04-28 NOTE — SUBJECTIVE & OBJECTIVE
"Past Medical History:   Diagnosis Date    Anticoagulant long-term use     Blood clot in vein 06/2016    Breast cancer 1994    Cataract     Hypertension     Pancreatic cancer 1998    Posterior capsular opacification, left eye     Psychiatric problem     Seizures 01/25/2016    Stroke     Therapy        Past Surgical History:   Procedure Laterality Date    BONE BIOSPY  3/9/16    BRAIN SURGERY  1/12/16    tumor removal 1/12/16    BREAST LUMPECTOMY  1998    left    CATARACT EXTRACTION Bilateral 2004    yag OU    CHOLECYSTECTOMY  1998    COLONOSCOPY N/A 11/13/2015    Procedure: COLONOSCOPY;  Surgeon: Alex Carmona MD;  Location: [facility] ([address] [place] [address]);  Service: Endoscopy;  Laterality: N/A;  2 year f/u    cysto and right ureteral stent  4/27/12    ecoli  2010    removal of blockage, done throat, then through the liver.    HYSTERECTOMY  1974    KIDNEY STONE SURGERY  2010--laser    left eswl  6/20/12    OOPHORECTOMY  4/25/2012    Laparoscopic BSO, lysis of adhesions, cystoscopy greater than 35mins     WHIPPLE PROCEDURE W/ LAPAROSCOPY  1998       Review of patient's allergies indicates:   Allergen Reactions    Tobradex [tobramycin-dexamethasone] Swelling    Iodinated contrast- oral and iv dye Hives    Morphine Other (See Comments)     "shaking" and tremors    Latex Rash    Phenytoin sodium extended Other (See Comments) and Rash       Family History     Problem Relation (Age of Onset)    Breast cancer Mother    Leukemia Paternal Grandfather    Lung cancer Mother    Stomach cancer Paternal Grandmother        Social History Main Topics    Smoking status: Former Smoker     Packs/day: 0.00     Years: 0.00     Quit date: 9/26/1961    Smokeless tobacco: Never Used    Alcohol use No    Drug use: No    Sexual activity: Yes     Partners: Male      Review of Systems   GEN: NAD, No fever, chills, nightsweats  HEENT: Denies HA, rhinorrhea, throat pain or erythema, choking or itchy/red eyes  PULM: See " HPI  CVS: see HPI, denies palpitations  GI/: Denies N/V/abdominal pain, no blood in stool, no hematuria  HEM/ONC: No weight changes, hemetemesis, hematochezia, hematuria  SKIN: No skin changes or rashes.   RHEUM: No joint pain, stiffness  MUSKL: normal gait.   PSYCH: Denies suicidal ideation/homocidal ideation, visual or auditory hallucination.     Objective:     Vital Signs (Most Recent):  Temp: 98 °F (36.7 °C) (04/28/18 1116)  Pulse: 91 (04/28/18 1116)  Resp: 17 (04/28/18 1116)  BP: 130/61 (04/28/18 1116)  SpO2: (!) 92 % (04/28/18 1116) Vital Signs (24h Range):  Temp:  [97.6 °F (36.4 °C)-98.8 °F (37.1 °C)] 98 °F (36.7 °C)  Pulse:  [] 91  Resp:  [17-20] 17  SpO2:  [87 %-96 %] 92 %  BP: (126-165)/(59-90) 130/61   Weight: 50.4 kg (111 lb 3.2 oz)  Body mass index is 18.5 kg/m².      Intake/Output Summary (Last 24 hours) at 04/28/18 1447  Last data filed at 04/28/18 1331   Gross per 24 hour   Intake              840 ml   Output              100 ml   Net              740 ml       Physical Exam  General: NAD, cooperative & interactive.  HEENT: AT/NC, PERRL, EOMI, oral mucosa moist.   Neck: Supple without JVD or palpable LAD.   Cardiac: RRR with no MRG; with brisk cap refill in distal extremities.  Respiratory: crackles bilateral bases  Abdomen: Soft, NT/ND. +BS.   Extremities: No edema.   Skin: no rashes or lesions  Neuro: Grossly intact to brief exam.  Vents:     Lines/Drains/Airways     Peripheral Intravenous Line                 Peripheral IV - Single Lumen 04/27/18 0417 Right Wrist 1 day              Significant Labs:    CBC/Anemia Profile:    Recent Labs  Lab 04/27/18  0218 04/28/18  0623   WBC 12.75* 13.84*   HGB 10.6* 10.0*   HCT 34.3* 33.2*    273   MCV 85 86   RDW 18.4* 18.4*        Chemistries:    Recent Labs  Lab 04/27/18  0218 04/27/18  0740 04/27/18  1204 04/28/18  0623   * 138 140 139   K 5.9* 5.1 4.9 5.0    107 106 105   CO2 24 26 28 27   BUN 24* 25* 24* 27*   CREATININE 1.8*  1.9* 1.9* 1.8*   CALCIUM 9.7 9.3 9.7 8.9   ALBUMIN  --   --   --  2.0*   PROT  --   --   --  5.5*   BILITOT  --   --   --  0.5   ALKPHOS  --   --   --  78   ALT  --   --   --  13   AST  --   --   --  14   MG 2.6  --   --  2.1   PHOS  --   --   --  3.8         Significant Imaging: CT: I have reviewed all pertinent results/findings within the past 24 hours and my personal findings are:  Significant interval worsening of diffuse ground-glass attenuation throughout the lungs with associated interlobular septal thickening now with patchy consolidation of the lower lobes.  Findings are concerning for worsening pulmonary edema, or pneumonitis versus pulmonary alveolar hemorrhage with possible component of lymphangitis carcinomatosis not definitively excluded.  Consider short-term interval follow-up.

## 2018-04-28 NOTE — ASSESSMENT & PLAN NOTE
-Currently on 5L NC (from 3L she was sent home on)  - on cefepime but unrevealing culture data thus far  - slightly elevated WBC though has been on steroids-- is back on 60 mg and unclear if she is getting benefit from steroids at all.  - CT scan worse than prior scan from earlier this month  - would attempt diuresis since CT differential includes pulmonary edema (BNP normal but diastolic dysfunction on echo and mildly reduced EF with patient reports of leg swelling often), alveolar hemorrhage (though no sputum production nor hemoptysis, of course this does not rule it out), infection- though clinically less likely given normal lactic acid, no fevers and does not appear acutely ill on exam. Also need to consider that this could be worsening of her underlying malignancy.  - would continue Abx for 48 hours and then de-escalate.  - will discuss possibility of bronchoscopy next week.

## 2018-04-28 NOTE — PLAN OF CARE
Problem: Patient Care Overview  Goal: Plan of Care Review  Outcome: Ongoing (interventions implemented as appropriate)  * AAO x 4  * Cardic diet patient tolerating well. See chart for % eaten.   * No edema noted.  * Bed at lowest position and locked, call light within reach, non-slip socks on, son at bedside, and side rails up x 2.  Encouraged patient to call for assistance when needed patient and son stated understanding.  * Right wrist PIV 20 G (04/27/18) patent, dressing clean dry and intact no signs or symptoms of infection noted.  * Neuro check every 4 hrs. See flow sheet for specific details.   * Oxygen 4 liters via nasal cannula.  Oxygen saturation 96-97 % respirations even and unlabored.   * Patient has no complaints of pain at this time.  * Skin assessment preformed no breakdown noted.  * Standard precautions maintained.  * Continuous telemetry monitoring continued ST SR 's.  * Patient able to turn self no assistance needed.  * Patient voiding clear yellow urine.  See flow sheet for intake and output totals.  * Patient blood pressure elevated MD notified and home medication ordered and administered.

## 2018-04-28 NOTE — ASSESSMENT & PLAN NOTE
- unclear cause at this time. DDx includes worsening lung cancer, PNA, volume overload, PE, or combo  - CXR with worsening bilateral infiltrates, however BNP wnl  - lasix 20 mg IV once  - duonebs prn  - CT chest ordered reveling for worsening disease  - placed on cefepime, de-escalate as studies result

## 2018-04-28 NOTE — CONSULTS
[facility]  Pulmonary Medicine  Consult Note    Patient Name: Naty St  MRN: [id number]  Admission Date: 4/27/2018  Hospital Length of Stay: 1 days  Code Status: Full Code  Attending Physician: Keenan  Primary Care Provider: Olvin Mars MD   Principal Problem: Acute on chronic respiratory failure with hypoxia    Inpatient consult to Pulmonology  Consult performed by: GOLD FAN  Consult ordered by: MUNDO AMBRIZ  Reason for consult: hypoxic respiratory failure        Subjective:     HPI:  Pt is a 71 y/o F with metastatic lung adenocarcinoma (h/o breast cancer in the past), HFpEF, HTN who was recently admitted earlier this month and was treated for TRALI vs TACO after experiencing acute hypoxic respiratory failure following a blood transfusion. She was treated with diuresis, ABx and steroids and went home on 3 L oxygen.     She underwent EBUS on 12/5/17. Pathology revealed adenocarcinoma but T790m could not be done as quantity was not insufficient. Her PET scan from 1/30/18 was concerning for progression of disease.     She is restarting Tagrisso as per last H/O note.    She is back for fatigue and worsening dyspnea on exertion. Today she is sitting up in a chair and says she feels better than when she came in but still not back to her baseline. We are consulted for any additional recommendations.    Hospital/ICU Course:  No notes on file    Past Medical History:   Diagnosis Date    Anticoagulant long-term use     Blood clot in vein 06/2016    Breast cancer 1994    Cataract     Hypertension     Pancreatic cancer 1998    Posterior capsular opacification, left eye     Psychiatric problem     Seizures 01/25/2016    Stroke     Therapy        Past Surgical History:   Procedure Laterality Date    BONE BIOSPY  3/9/16    BRAIN SURGERY  1/12/16    tumor removal 1/12/16    BREAST LUMPECTOMY  1998    left    CATARACT EXTRACTION Bilateral 2004    yag OU     "CHOLECYSTECTOMY  1998    COLONOSCOPY N/A 11/13/2015    Procedure: COLONOSCOPY;  Surgeon: Alex Carmona MD;  Location: [facility] ([address] [place] [address]);  Service: Endoscopy;  Laterality: N/A;  2 year f/u    cysto and right ureteral stent  4/27/12    ecoli  2010    removal of blockage, done throat, then through the liver.    HYSTERECTOMY  1974    KIDNEY STONE SURGERY  2010--laser    left eswl  6/20/12    OOPHORECTOMY  4/25/2012    Laparoscopic BSO, lysis of adhesions, cystoscopy greater than 35mins     WHIPPLE PROCEDURE W/ LAPAROSCOPY  1998       Review of patient's allergies indicates:   Allergen Reactions    Tobradex [tobramycin-dexamethasone] Swelling    Iodinated contrast- oral and iv dye Hives    Morphine Other (See Comments)     "shaking" and tremors    Latex Rash    Phenytoin sodium extended Other (See Comments) and Rash       Family History     Problem Relation (Age of Onset)    Breast cancer Mother    Leukemia Paternal Grandfather    Lung cancer Mother    Stomach cancer Paternal Grandmother        Social History Main Topics    Smoking status: Former Smoker     Packs/day: 0.00     Years: 0.00     Quit date: 9/26/1961    Smokeless tobacco: Never Used    Alcohol use No    Drug use: No    Sexual activity: Yes     Partners: Male      Review of Systems   GEN: NAD, No fever, chills, nightsweats  HEENT: Denies HA, rhinorrhea, throat pain or erythema, choking or itchy/red eyes  PULM: See HPI  CVS: see HPI, denies palpitations  GI/: Denies N/V/abdominal pain, no blood in stool, no hematuria  HEM/ONC: No weight changes, hemetemesis, hematochezia, hematuria  SKIN: No skin changes or rashes.   RHEUM: No joint pain, stiffness  MUSKL: normal gait.   PSYCH: Denies suicidal ideation/homocidal ideation, visual or auditory hallucination.     Objective:     Vital Signs (Most Recent):  Temp: 98 °F (36.7 °C) (04/28/18 1116)  Pulse: 91 (04/28/18 1116)  Resp: 17 (04/28/18 1116)  BP: 130/61 (04/28/18 1116)  SpO2: (!) 92 % " (04/28/18 1116) Vital Signs (24h Range):  Temp:  [97.6 °F (36.4 °C)-98.8 °F (37.1 °C)] 98 °F (36.7 °C)  Pulse:  [] 91  Resp:  [17-20] 17  SpO2:  [87 %-96 %] 92 %  BP: (126-165)/(59-90) 130/61   Weight: 50.4 kg (111 lb 3.2 oz)  Body mass index is 18.5 kg/m².      Intake/Output Summary (Last 24 hours) at 04/28/18 1447  Last data filed at 04/28/18 1331   Gross per 24 hour   Intake              840 ml   Output              100 ml   Net              740 ml       Physical Exam  General: NAD, cooperative & interactive.  HEENT: AT/NC, PERRL, EOMI, oral mucosa moist.   Neck: Supple without JVD or palpable LAD.   Cardiac: RRR with no MRG; with brisk cap refill in distal extremities.  Respiratory: crackles bilateral bases  Abdomen: Soft, NT/ND. +BS.   Extremities: No edema.   Skin: no rashes or lesions  Neuro: Grossly intact to brief exam.  Vents:     Lines/Drains/Airways     Peripheral Intravenous Line                 Peripheral IV - Single Lumen 04/27/18 0417 Right Wrist 1 day              Significant Labs:    CBC/Anemia Profile:    Recent Labs  Lab 04/27/18  0218 04/28/18  0623   WBC 12.75* 13.84*   HGB 10.6* 10.0*   HCT 34.3* 33.2*    273   MCV 85 86   RDW 18.4* 18.4*        Chemistries:    Recent Labs  Lab 04/27/18  0218 04/27/18  0740 04/27/18  1204 04/28/18  0623   * 138 140 139   K 5.9* 5.1 4.9 5.0    107 106 105   CO2 24 26 28 27   BUN 24* 25* 24* 27*   CREATININE 1.8* 1.9* 1.9* 1.8*   CALCIUM 9.7 9.3 9.7 8.9   ALBUMIN  --   --   --  2.0*   PROT  --   --   --  5.5*   BILITOT  --   --   --  0.5   ALKPHOS  --   --   --  78   ALT  --   --   --  13   AST  --   --   --  14   MG 2.6  --   --  2.1   PHOS  --   --   --  3.8         Significant Imaging: CT: I have reviewed all pertinent results/findings within the past 24 hours and my personal findings are:  Significant interval worsening of diffuse ground-glass attenuation throughout the lungs with associated interlobular septal thickening now with  patchy consolidation of the lower lobes.  Findings are concerning for worsening pulmonary edema, or pneumonitis versus pulmonary alveolar hemorrhage with possible component of lymphangitis carcinomatosis not definitively excluded.  Consider short-term interval follow-up.    Assessment/Plan:     Pulmonary   * Acute on chronic respiratory failure with hypoxia    -Currently on 5L NC (from 3L she was sent home on)  - on cefepime but unrevealing culture data thus far  - slightly elevated WBC though has been on steroids-- is back on 60 mg and unclear if she is getting benefit from steroids at all.  - CT scan worse than prior scan from earlier this month  - would attempt diuresis since CT differential includes pulmonary edema (BNP normal but diastolic dysfunction on echo and mildly reduced EF with patient reports of leg swelling often), alveolar hemorrhage (though no sputum production nor hemoptysis, of course this does not rule it out), infection- though clinically less likely given normal lactic acid, no fevers and does not appear acutely ill on exam. Also need to consider that this could be worsening of her underlying malignancy.  - would continue Abx for 48 hours and then de-escalate.  - will discuss possibility of bronchoscopy next week.            Thank you for your consult. I will follow-up with patient. Please contact us if you have any additional questions.     Ladonna Johnson MD  Pulmonary Medicine  [facility]

## 2018-04-28 NOTE — PROGRESS NOTES
Dr Bergeron notified son had questions concerning CT scan results and why po chemo was not ordered. MD stated he would speak with family.

## 2018-04-28 NOTE — SUBJECTIVE & OBJECTIVE
Interval History: NAEON. Increasing oxygen requirements, now 5L from [facility]    Oncology Treatment Plan:   [No treatment plan]    Medications:  Continuous Infusions:  Scheduled Meds:   amLODIPine  5 mg Oral Daily    apixaban  5 mg Oral BID    atorvastatin  40 mg Oral Daily    ceFEPime (MAXIPIME) IVPB  1 g Intravenous Q24H    dronabinol  5 mg Oral BID AC    levETIRAcetam  750 mg Oral BID    lipase-protease-amylase 12,000-38,000-60,000 units  1 capsule Oral TID WM    multivitamin  1 tablet Oral Daily    predniSONE  60 mg Oral Daily    sodium bicarbonate  650 mg Oral TID     PRN Meds:acetaminophen, albuterol-ipratropium 2.5mg-0.5mg/3mL, dextrose 50%, dextrose 50%, glucagon (human recombinant), glucose, glucose, LORazepam, ondansetron, sodium chloride 0.9%     Review of Systems   Constitutional: Positive for fatigue. Negative for chills and fever.   Respiratory: Positive for cough and shortness of breath. Negative for chest tightness.    Cardiovascular: Positive for leg swelling. Negative for chest pain.   Gastrointestinal: Negative for abdominal pain, constipation, diarrhea and vomiting.   Genitourinary: Negative for difficulty urinating and dysuria.   Skin: Positive for color change and rash.   Neurological: Positive for weakness and light-headedness. Negative for syncope.     Objective:     Vital Signs (Most Recent):  Temp: 98.1 °F (36.7 °C) (04/28/18 0727)  Pulse: 90 (04/28/18 0730)  Resp: 20 (04/28/18 0727)  BP: 138/73 (04/28/18 0727)  SpO2: (!) 91 % (04/28/18 0730) Vital Signs (24h Range):  Temp:  [97.6 °F (36.4 °C)-98.8 °F (37.1 °C)] 98.1 °F (36.7 °C)  Pulse:  [] 90  Resp:  [18-20] 20  SpO2:  [87 %-100 %] 91 %  BP: (126-165)/(59-90) 138/73     Weight: 50.4 kg (111 lb 3.2 oz)  Body mass index is 18.5 kg/m².  Body surface area is 1.52 meters squared.      Intake/Output Summary (Last 24 hours) at 04/28/18 7411  Last data filed at 04/27/18 2300   Gross per 24 hour   Intake              360 ml   Output               400 ml   Net              -40 ml       Physical Exam   Constitutional: She is oriented to person, place, and time. She appears well-developed and well-nourished. No distress.   HENT:   Head: Normocephalic and atraumatic.   Eyes: Conjunctivae are normal.   Neck: Normal range of motion. Neck supple.   Cardiovascular: Normal rate and regular rhythm.    Pulmonary/Chest: She has decreased breath sounds. She has no wheezes. She has rales.   Coarse breath sounds    Abdominal: Soft. Bowel sounds are normal. She exhibits no distension. There is no tenderness.   Musculoskeletal: She exhibits edema. She exhibits no tenderness.   Neurological: She is alert and oriented to person, place, and time.   No gross focal deficits   Skin: Skin is warm and dry. She is not diaphoretic. There is erythema. There is pallor.   Vitals reviewed.      Significant Labs:   Recent Results (from the past 24 hour(s))   Troponin I    Collection Time: 04/27/18 12:04 PM   Result Value Ref Range    Troponin I 0.030 (H) 0.000 - 0.026 ng/mL   Basic metabolic panel    Collection Time: 04/27/18 12:04 PM   Result Value Ref Range    Sodium 140 136 - 145 mmol/L    Potassium 4.9 3.5 - 5.1 mmol/L    Chloride 106 95 - 110 mmol/L    CO2 28 23 - 29 mmol/L    Glucose 112 (H) 70 - 110 mg/dL    BUN, Bld 24 (H) 8 - 23 mg/dL    Creatinine 1.9 (H) 0.5 - 1.4 mg/dL    Calcium 9.7 8.7 - 10.5 mg/dL    Anion Gap 6 (L) 8 - 16 mmol/L    eGFR if African American 29.7 (A) >60 mL/min/1.73 m^2    eGFR if non  25.8 (A) >60 mL/min/1.73 m^2   Troponin I    Collection Time: 04/27/18  6:47 PM   Result Value Ref Range    Troponin I 0.028 (H) 0.000 - 0.026 ng/mL   Comprehensive Metabolic Panel (CMP)    Collection Time: 04/28/18  6:23 AM   Result Value Ref Range    Sodium 139 136 - 145 mmol/L    Potassium 5.0 3.5 - 5.1 mmol/L    Chloride 105 95 - 110 mmol/L    CO2 27 23 - 29 mmol/L    Glucose 116 (H) 70 - 110 mg/dL    BUN, Bld 27 (H) 8 - 23 mg/dL    Creatinine 1.8  (H) 0.5 - 1.4 mg/dL    Calcium 8.9 8.7 - 10.5 mg/dL    Total Protein 5.5 (L) 6.0 - 8.4 g/dL    Albumin 2.0 (L) 3.5 - 5.2 g/dL    Total Bilirubin 0.5 0.1 - 1.0 mg/dL    Alkaline Phosphatase 78 55 - 135 U/L    AST 14 10 - 40 U/L    ALT 13 10 - 44 U/L    Anion Gap 7 (L) 8 - 16 mmol/L    eGFR if  31.7 (A) >60 mL/min/1.73 m^2    eGFR if non African American 27.5 (A) >60 mL/min/1.73 m^2   Magnesium    Collection Time: 04/28/18  6:23 AM   Result Value Ref Range    Magnesium 2.1 1.6 - 2.6 mg/dL   Phosphorus    Collection Time: 04/28/18  6:23 AM   Result Value Ref Range    Phosphorus 3.8 2.7 - 4.5 mg/dL   CBC with Automated Differential    Collection Time: 04/28/18  6:23 AM   Result Value Ref Range    WBC 13.84 (H) 3.90 - 12.70 K/uL    RBC 3.87 (L) 4.00 - 5.40 M/uL    Hemoglobin 10.0 (L) 12.0 - 16.0 g/dL    Hematocrit 33.2 (L) 37.0 - 48.5 %    MCV 86 82 - 98 fL    MCH 25.8 (L) 27.0 - 31.0 pg    MCHC 30.1 (L) 32.0 - 36.0 g/dL    RDW 18.4 (H) 11.5 - 14.5 %    Platelets 273 150 - 350 K/uL    MPV 9.7 9.2 - 12.9 fL    Immature Granulocytes 1.0 (H) 0.0 - 0.5 %    Gran # (ANC) 10.6 (H) 1.8 - 7.7 K/uL    Immature Grans (Abs) 0.14 (H) 0.00 - 0.04 K/uL    Lymph # 1.5 1.0 - 4.8 K/uL    Mono # 1.1 (H) 0.3 - 1.0 K/uL    Eos # 0.5 0.0 - 0.5 K/uL    Baso # 0.04 0.00 - 0.20 K/uL    nRBC 0 0 /100 WBC    Gran% 76.5 (H) 38.0 - 73.0 %    Lymph% 10.5 (L) 18.0 - 48.0 %    Mono% 8.1 4.0 - 15.0 %    Eosinophil% 3.6 0.0 - 8.0 %    Basophil% 0.3 0.0 - 1.9 %    Differential Method Automated      Diagnostic Results:  CT Chest w/o 04/27/2018:  Significant interval worsening of diffuse ground-glass attenuation throughout the lungs with associated interlobular septal thickening now with patchy consolidation of the lower lobes.  Findings are concerning for worsening pulmonary edema, or pneumonitis versus pulmonary alveolar hemorrhage with possible component of lymphangitis carcinomatosis not definitively excluded.  Consider short-term  interval follow-up.    Stable right upper lobe soft tissue nodules with mediastinal lymphadenopathy.  Findings are consistent with metastatic lung cancer.    Slight interval increase in small bilateral pleural effusions.

## 2018-04-28 NOTE — PLAN OF CARE
Problem: Patient Care Overview  Goal: Plan of Care Review  Outcome: Ongoing (interventions implemented as appropriate)  Fall, pressure ulcer, and infection precautions continued. MD notified of patient increased oxygen requirement this morning. Neuro checks monitored Q4hrs. Therapy worked with patient, up in the chair in the afternoon. Bedside commode placed near patient, instructed to call for help. Pulmonary service consulted. One time dose of Lasix given, output charted. Telemetry continued. Family at bedside. Patient stable, will continue to monitor.

## 2018-04-28 NOTE — PLAN OF CARE
Problem: Physical Therapy Goal  Goal: Physical Therapy Goal  Goals to be met by: 18     Patient will increase functional independence with mobility by performin. Supine to sit with Set-up [place]  2. Sit to supine with Set-up [place]  3. Sit to stand transfer with Supervision  4. Gait  x 150 feet with Supervision using Rolling Walker.     Outcome: Ongoing (interventions implemented as appropriate)  PT eval completed.  Pt with significant endurance deficits requiring 5 L supplemental O2 during mobility and with SOB during most tasks    Billy Acuña, PT  2018

## 2018-04-28 NOTE — PROGRESS NOTES
[facility]  Hematology/Oncology  Progress Note    Patient Name: Naty St  Admission Date: 4/27/2018  Hospital Length of Stay: 1 days  Code Status: Full Code     Subjective:     HPI:  The patient is a 72 y.o. female with lung cancer, HTN, breast cancer,HFpEF, and stroke in October 2017 who presents to the ED with a complaint of fatigue and worsening DELA CRUZ.  Reports that she has not been able to ambulate secondary to fatigue for the past week.   patient states she is able to stand but is too weak/fatigued to actually walk. Denies focal deficits. Son notes appetite has been decreased, but pt ate well tonight.  Pt diagnosed PNA 4/9 and was discharged on 4/16 on 3L oxygen.  She is on blood thinners due to blood clot two years ago in her LLE.  Son also notes slower cognitive function and states that she asked what her 's name was earlier today, which is not normal for her. Her DELA CRUZ has also been worsening for a week. She becomes very dyspneic with minimal movement in bed. She denies fevers and chills at home, dysuria, diarrhea.    Oncologic History:  Ms. Naty St was admitted to the hospital between 01/25/2016 and 01/28/2016. She has a history of pancreatic cancer 17 years ago, breast cancer and meningioma, presented to the hospital complaining of loss of consciousness. The patient apparently was in her normal state of health and she stood up to go to the bathroom and fell to the floor. The patient's daughter helped the mother up in the bathroom and noted that her mother's upper extremities were shaking and eye rolling. No reports of bowel or bladder incontinence, tongue biting or rolling. The second episode lasted about four minutes and she was extremely lethargic following that. Apparently, the patient had a meningioma resection done in the past; however, recently, underwent neurosurgical resection with Dr. 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    Additional immunohistochemical stains were performed which revealed the tumor cells to be are positive for TTF1 and negative for ER and GCDFP. The morphology and TTF1 positivity are most consistent with lung primary.    Of note, imaging scan at the end of January 2016 revealed a mass in the lung at 2 cm in the medial aspect of the apical segment of the right upper lobe abutting the mediastinum at the level of the azygous vein and abutting and possibly encasing the segmental bronchi and vessels of the apical segment of the right upper lobe and no pleural fluid was present. Also, there is an enlarged right paratracheal lymph node. No evidence of any metastatic disease at the pancreatic site with postoperative changes post Whipple disease  Her PET Scan from 2/15/16 reveal "Hypermetabolic mass in the right lung apex consistent with a primary malignancy. Hypermetabolic mediastinal lymph nodes consistent with metastatic disease. Right sacral hypermetabolic lesion consistent with metastatic disease, noting additional mildly sclerotic lesions in multiple vertebral bodies which do not demonstrate abnormal hypermetabolism  She underwent IR bone biopsy which revealed metastatic adenocarcinoma of lung origin. 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  She has completed focal RT to brain lesions in March 2016.    PET scan from 6/6/16 shows "Dramatic almost complete response to therapy."  Lovenox started after US lower ext 6/7/16 shows Acute complete occlusion of one of the left posterior tibial vein.Remote partial thrombus of the proximal left superficial femoral vein.  She is on Tarceva.   12/6/16 PET scan reveals "Stable right upper lobe lesion and right hilar lymph node. No new lesions identified. Trace left pleural effusion".  1/27/17 Renal u/s "Medical renal disease. Nonobstructive right nephrolithiasis"  1/31/17 PET - "Right " "upper lobe nodule and right hilar lymph node similar and very low grade activity.  There is no definite evidence of recurrence."   11/9/17 PET "In this patient with history of lung cancer, there is interval increase in size and hypermetabolism of a right upper lobe lung lesion concerning for recurrent disease. Additionally, there is interval appearance of a hypermetabolic paratracheal lymph node suspicious for metastatic disease"         She progressed on Tarceva She underwent EBUS on 12/5/17. Pathology revealed adenocarcinoma but T790m could not be done as quantity was not insufficient. Her PET scan from 1/30/18 revealed The patient's previously identified abnormal lesions is slightly greater uptake of FDG on today's study compared with prior exam. These findings are concerning for progression of disease"    Interval History: NAEON. Increasing oxygen requirements, now 5L from [facility]    Oncology Treatment Plan:   [No treatment plan]    Medications:  Continuous Infusions:  Scheduled Meds:   amLODIPine  5 mg Oral Daily    apixaban  5 mg Oral BID    atorvastatin  40 mg Oral Daily    ceFEPime (MAXIPIME) IVPB  1 g Intravenous Q24H    dronabinol  5 mg Oral BID AC    levETIRAcetam  750 mg Oral BID    lipase-protease-amylase 12,000-38,000-60,000 units  1 capsule Oral TID WM    multivitamin  1 tablet Oral Daily    predniSONE  60 mg Oral Daily    sodium bicarbonate  650 mg Oral TID     PRN Meds:acetaminophen, albuterol-ipratropium 2.5mg-0.5mg/3mL, dextrose 50%, dextrose 50%, glucagon (human recombinant), glucose, glucose, LORazepam, ondansetron, sodium chloride 0.9%     Review of Systems   Constitutional: Positive for fatigue. Negative for chills and fever.   Respiratory: Positive for cough and shortness of breath. Negative for chest tightness.    Cardiovascular: Positive for leg swelling. Negative for chest pain.   Gastrointestinal: Negative for abdominal pain, constipation, diarrhea and vomiting.   Genitourinary: " Negative for difficulty urinating and dysuria.   Skin: Positive for color change and rash.   Neurological: Positive for weakness and light-headedness. Negative for syncope.     Objective:     Vital Signs (Most Recent):  Temp: 98.1 °F (36.7 °C) (04/28/18 0727)  Pulse: 90 (04/28/18 0730)  Resp: 20 (04/28/18 0727)  BP: 138/73 (04/28/18 0727)  SpO2: (!) 91 % (04/28/18 0730) Vital Signs (24h Range):  Temp:  [97.6 °F (36.4 °C)-98.8 °F (37.1 °C)] 98.1 °F (36.7 °C)  Pulse:  [] 90  Resp:  [18-20] 20  SpO2:  [87 %-100 %] 91 %  BP: (126-165)/(59-90) 138/73     Weight: 50.4 kg (111 lb 3.2 oz)  Body mass index is 18.5 kg/m².  Body surface area is 1.52 meters squared.      Intake/Output Summary (Last 24 hours) at 04/28/18 0743  Last data filed at 04/27/18 2300   Gross per 24 hour   Intake              360 ml   Output              400 ml   Net              -40 ml       Physical Exam   Constitutional: She is oriented to person, place, and time. She appears well-developed and well-nourished. No distress.   HENT:   Head: Normocephalic and atraumatic.   Eyes: Conjunctivae are normal.   Neck: Normal range of motion. Neck supple.   Cardiovascular: Normal rate and regular rhythm.    Pulmonary/Chest: She has decreased breath sounds. She has no wheezes. She has rales.   Coarse breath sounds    Abdominal: Soft. Bowel sounds are normal. She exhibits no distension. There is no tenderness.   Musculoskeletal: She exhibits edema. She exhibits no tenderness.   Neurological: She is alert and oriented to person, place, and time.   No gross focal deficits   Skin: Skin is warm and dry. She is not diaphoretic. There is erythema. There is pallor.   Vitals reviewed.      Significant Labs:   Recent Results (from the past 24 hour(s))   Troponin I    Collection Time: 04/27/18 12:04 PM   Result Value Ref Range    Troponin I 0.030 (H) 0.000 - 0.026 ng/mL   Basic metabolic panel    Collection Time: 04/27/18 12:04 PM   Result Value Ref Range    Sodium  140 136 - 145 mmol/L    Potassium 4.9 3.5 - 5.1 mmol/L    Chloride 106 95 - 110 mmol/L    CO2 28 23 - 29 mmol/L    Glucose 112 (H) 70 - 110 mg/dL    BUN, Bld 24 (H) 8 - 23 mg/dL    Creatinine 1.9 (H) 0.5 - 1.4 mg/dL    Calcium 9.7 8.7 - 10.5 mg/dL    Anion Gap 6 (L) 8 - 16 mmol/L    eGFR if African American 29.7 (A) >60 mL/min/1.73 m^2    eGFR if non  25.8 (A) >60 mL/min/1.73 m^2   Troponin I    Collection Time: 04/27/18  6:47 PM   Result Value Ref Range    Troponin I 0.028 (H) 0.000 - 0.026 ng/mL   Comprehensive Metabolic Panel (CMP)    Collection Time: 04/28/18  6:23 AM   Result Value Ref Range    Sodium 139 136 - 145 mmol/L    Potassium 5.0 3.5 - 5.1 mmol/L    Chloride 105 95 - 110 mmol/L    CO2 27 23 - 29 mmol/L    Glucose 116 (H) 70 - 110 mg/dL    BUN, Bld 27 (H) 8 - 23 mg/dL    Creatinine 1.8 (H) 0.5 - 1.4 mg/dL    Calcium 8.9 8.7 - 10.5 mg/dL    Total Protein 5.5 (L) 6.0 - 8.4 g/dL    Albumin 2.0 (L) 3.5 - 5.2 g/dL    Total Bilirubin 0.5 0.1 - 1.0 mg/dL    Alkaline Phosphatase 78 55 - 135 U/L    AST 14 10 - 40 U/L    ALT 13 10 - 44 U/L    Anion Gap 7 (L) 8 - 16 mmol/L    eGFR if  31.7 (A) >60 mL/min/1.73 m^2    eGFR if non African American 27.5 (A) >60 mL/min/1.73 m^2   Magnesium    Collection Time: 04/28/18  6:23 AM   Result Value Ref Range    Magnesium 2.1 1.6 - 2.6 mg/dL   Phosphorus    Collection Time: 04/28/18  6:23 AM   Result Value Ref Range    Phosphorus 3.8 2.7 - 4.5 mg/dL   CBC with Automated Differential    Collection Time: 04/28/18  6:23 AM   Result Value Ref Range    WBC 13.84 (H) 3.90 - 12.70 K/uL    RBC 3.87 (L) 4.00 - 5.40 M/uL    Hemoglobin 10.0 (L) 12.0 - 16.0 g/dL    Hematocrit 33.2 (L) 37.0 - 48.5 %    MCV 86 82 - 98 fL    MCH 25.8 (L) 27.0 - 31.0 pg    MCHC 30.1 (L) 32.0 - 36.0 g/dL    RDW 18.4 (H) 11.5 - 14.5 %    Platelets 273 150 - 350 K/uL    MPV 9.7 9.2 - 12.9 fL    Immature Granulocytes 1.0 (H) 0.0 - 0.5 %    Gran # (ANC) 10.6 (H) 1.8 - 7.7 K/uL     Immature Grans (Abs) 0.14 (H) 0.00 - 0.04 K/uL    Lymph # 1.5 1.0 - 4.8 K/uL    Mono # 1.1 (H) 0.3 - 1.0 K/uL    Eos # 0.5 0.0 - 0.5 K/uL    Baso # 0.04 0.00 - 0.20 K/uL    nRBC 0 0 /100 WBC    Gran% 76.5 (H) 38.0 - 73.0 %    Lymph% 10.5 (L) 18.0 - 48.0 %    Mono% 8.1 4.0 - 15.0 %    Eosinophil% 3.6 0.0 - 8.0 %    Basophil% 0.3 0.0 - 1.9 %    Differential Method Automated      Diagnostic Results:  CT Chest w/o 04/27/2018:  Significant interval worsening of diffuse ground-glass attenuation throughout the lungs with associated interlobular septal thickening now with patchy consolidation of the lower lobes.  Findings are concerning for worsening pulmonary edema, or pneumonitis versus pulmonary alveolar hemorrhage with possible component of lymphangitis carcinomatosis not definitively excluded.  Consider short-term interval follow-up.    Stable right upper lobe soft tissue nodules with mediastinal lymphadenopathy.  Findings are consistent with metastatic lung cancer.    Slight interval increase in small bilateral pleural effusions.      Assessment/Plan:     * Acute on chronic respiratory failure with hypoxia    - unclear cause at this time. DDx includes worsening lung cancer, PNA, volume overload, PE, or combo  - CXR with worsening bilateral infiltrates, however BNP wnl  - lasix 20 mg IV once  - duonebs prn  - CT chest ordered reveling for worsening disease  - placed on cefepime, de-escalate as studies result      Generalized weakness    Patient has had an acute on chronic worsening of weakness and debility in the last week.   -CT head unrevealing for acute process  -Possibly related to UTI  -PT/OT  -recommend GOC      Metabolic encephalopathy    Son reports some difficulty with word finding after stroke but has had much more difficulty in past week  -CT head to r/o stroke, new brain mets  -UA dirty, UTI possibly causing entire picture  -Will continue cefepime and f/u cx      Debility    PT/OT      UTI (urinary tract  infection)    - urine cx pending  - cefepime 1g daily      History of stroke    - cont atorvastatin      Seizure disorder    - cont home dose keppra      Anticoagulant long-term use    Patient on apixiban for DVT diagnosed two years ago in her LLE  Consider switching to lovenox in setting of cancer      CKD (chronic kidney disease) stage 3, GFR 30-59 ml/min    Baseline      Essential hypertension    Amlodipine 5mg daily restarted  -Will restart toprol XL as appropriate, HR 80s-90s currently   -will monitor and adjust prn        Staff attestation to follow, treatment plan not yet discussed with attending physician.    Maruisio Bergeron MD  Hematology/Oncology, PGY-1  [facility]

## 2018-04-28 NOTE — ASSESSMENT & PLAN NOTE
Son reports some difficulty with word finding after stroke but has had much more difficulty in past week  -CT head to r/o stroke, new brain mets  -UA dirty, UTI possibly causing entire picture  -Will continue cefepime and f/u cx

## 2018-04-28 NOTE — HPI
Pt is a 71 y/o F with metastatic lung adenocarcinoma (h/o breast cancer in the past), HFpEF, HTN who was recently admitted earlier this month and was treated for TRALI vs TACO after experiencing acute hypoxic respiratory failure following a blood transfusion. She was treated with diuresis, ABx and steroids and went home on 3 L oxygen.     She underwent EBUS on 12/5/17. Pathology revealed adenocarcinoma but T790m could not be done as quantity was not insufficient. Her PET scan from 1/30/18 was concerning for progression of disease.     She is restarting Tagrisso as per last H/O note.    She is back for fatigue and worsening dyspnea on exertion. Today she is sitting up in a chair and says she feels better than when she came in but still not back to her baseline. We are consulted for any additional recommendations.

## 2018-04-28 NOTE — PT/OT/SLP EVAL
"Physical Therapy Evaluation    Patient Name:  Naty St   MRN:  [id number]    Recommendations:     Discharge Recommendations:  nursing facility, skilled   Discharge Equipment Recommendations: wheelchair   Barriers to discharge: Decreased caregiver support    Assessment:     Naty St is a 72 y.o. female admitted with a medical diagnosis of Acute on chronic respiratory failure with hypoxia.  She presents with the following impairments/functional limitations:  weakness, impaired balance, impaired cardiopulmonary response to activity, impaired endurance, impaired functional mobilty, gait instability, decreased lower extremity function. Pt presents with fair strength and CGA for most balance and gait.  She is limited largely by endurance deficits becoming SOB on 5 L O2 with ambulation in the room.    Rehab Prognosis:  good; patient would benefit from acute skilled PT services to address these deficits and reach maximum level of function.      Recent Surgery: * No surgery found *      Plan:     During this hospitalization, patient to be seen 4 x/week to address the above listed problems via gait training, therapeutic activities, therapeutic exercises  · Plan of Care Expires:  05/28/18   Plan of Care Reviewed with: patient    Subjective     Communicated with RN prior to session.  Patient found supine upon PT entry to room, agreeable to evaluation.      Chief Complaint: fatigue/edurance limitations  Patient comments/goals: "I'm ready to get out of this bed"  Pain/Comfort:  · Pain Rating 1: 0/10  · Pain Rating Post-Intervention 1: 0/10    Patients cultural, spiritual, Christian conflicts given the current situation: no    Living Environment:  Lives with her  but also has family who can check in on her.  She has experienced declining funciton over the last few weeks especially the last week with several falls occurring at home in the last week.  Patient has the following equipment: walker, " rolling, bath bench, bedside commode.  DME owned (not currently used): none.  Upon discharge, patient will have assistance from family but may benefit from SNF placement first..    Objective:     Patient found with: telemetry, oxygen     General Precautions: Standard, fall   Orthopedic Precautions:N/A   Braces: N/A     Exams:  · RLE ROM: WFL  · RLE Strength: WFL  · LLE ROM: WFL  · LLE Strength: WFL    Functional Mobility:  · Bed Mobility:     · Supine to Sit: contact guard assistance  · Transfers:     · Sit to Stand:  contact guard assistance and minimum assistance with rolling walker  · Gait: 7 steps to [place] on 6 L O2.  Pt then ambulated about 15ft within the room on 6 L supplemetnal O2 with short steps but no significant LOB requiring only CGA.      AM-PAC 6 CLICK MOBILITY  Total Score:18       Therapeutic Activities and Exercises:   pt educated on benefit of OOB activity  Pt educated on need for use of BScommode and RW with staff for toileting  Pt educated on need for further IP therapy to maximize functional gains.    Patient left up in chair with all lines intact and call button in reach.    GOALS:    Physical Therapy Goals        Problem: Physical Therapy Goal    Goal Priority Disciplines Outcome Goal Variances Interventions   Physical Therapy Goal     PT/OT, PT Ongoing (interventions implemented as appropriate)     Description:  Goals to be met by: 18     Patient will increase functional independence with mobility by performin. Supine to sit with Set-up [place]  2. Sit to supine with Set-up [place]  3. Sit to stand transfer with Supervision  4. Gait  x 150 feet with Supervision using Rolling Walker.                       History:     Past Medical History:   Diagnosis Date    Anticoagulant long-term use     Blood clot in vein 2016    Breast cancer     Cataract     Hypertension     Pancreatic cancer     Posterior capsular opacification, left eye     Psychiatric problem      Seizures 01/25/2016    Stroke     Therapy        Past Surgical History:   Procedure Laterality Date    BONE BIOSPY  3/9/16    BRAIN SURGERY  1/12/16    tumor removal 1/12/16    BREAST LUMPECTOMY  1998    left    CATARACT EXTRACTION Bilateral 2004    yag OU    CHOLECYSTECTOMY  1998    COLONOSCOPY N/A 11/13/2015    Procedure: COLONOSCOPY;  Surgeon: Alex Carmona MD;  Location: [facility] ([address] [place] [address]);  Service: Endoscopy;  Laterality: N/A;  2 year f/u    cysto and right ureteral stent  4/27/12    ecoli  2010    removal of blockage, done throat, then through the liver.    HYSTERECTOMY  1974    KIDNEY STONE SURGERY  2010--laser    left eswl  6/20/12    OOPHORECTOMY  4/25/2012    Laparoscopic BSO, lysis of adhesions, cystoscopy greater than 35mins     WHIPPLE PROCEDURE W/ LAPAROSCOPY  1998         Time Tracking:     PT Received On: 04/28/18  PT Start Time: 1328     PT Stop Time: 1353  PT Total Time (min): 25 min     Billable Minutes: Evaluation 15 and Gait Training 10      Billy Acuña, PT  04/28/2018

## 2018-04-28 NOTE — ASSESSMENT & PLAN NOTE
Resolved   - unclear cause as patients kidney function appears to be at baseline. No EKG changes  - administered lactulose in ED  - will give one time dose lasix 20 mg IV  - repeat BMP around noon

## 2018-04-29 LAB
ALBUMIN SERPL BCP-MCNC: 2.3 G/DL
ALP SERPL-CCNC: 87 U/L
ALT SERPL W/O P-5'-P-CCNC: 14 U/L
ANION GAP SERPL CALC-SCNC: 7 MMOL/L
AST SERPL-CCNC: 17 U/L
BASOPHILS # BLD AUTO: 0.04 K/UL
BASOPHILS NFR BLD: 0.3 %
BILIRUB SERPL-MCNC: 0.4 MG/DL
BUN SERPL-MCNC: 25 MG/DL
CALCIUM SERPL-MCNC: 9 MG/DL
CHLORIDE SERPL-SCNC: 103 MMOL/L
CO2 SERPL-SCNC: 30 MMOL/L
CREAT SERPL-MCNC: 1.7 MG/DL
DIFFERENTIAL METHOD: ABNORMAL
EOSINOPHIL # BLD AUTO: 0.1 K/UL
EOSINOPHIL NFR BLD: 0.3 %
ERYTHROCYTE [DISTWIDTH] IN BLOOD BY AUTOMATED COUNT: 18.2 %
EST. GFR  (AFRICAN AMERICAN): 34 ML/MIN/1.73 M^2
EST. GFR  (NON AFRICAN AMERICAN): 29.5 ML/MIN/1.73 M^2
GLUCOSE SERPL-MCNC: 110 MG/DL
HCT VFR BLD AUTO: 36.5 %
HGB BLD-MCNC: 10.9 G/DL
IMM GRANULOCYTES # BLD AUTO: 0.14 K/UL
IMM GRANULOCYTES NFR BLD AUTO: 0.9 %
LYMPHOCYTES # BLD AUTO: 1.5 K/UL
LYMPHOCYTES NFR BLD: 9.3 %
MAGNESIUM SERPL-MCNC: 2.1 MG/DL
MCH RBC QN AUTO: 25.7 PG
MCHC RBC AUTO-ENTMCNC: 29.9 G/DL
MCV RBC AUTO: 86 FL
MONOCYTES # BLD AUTO: 1.2 K/UL
MONOCYTES NFR BLD: 7.2 %
NEUTROPHILS # BLD AUTO: 13.1 K/UL
NEUTROPHILS NFR BLD: 82 %
NRBC BLD-RTO: 0 /100 WBC
PHOSPHATE SERPL-MCNC: 3.3 MG/DL
PLATELET # BLD AUTO: 307 K/UL
PMV BLD AUTO: 10 FL
POTASSIUM SERPL-SCNC: 4.4 MMOL/L
PROT SERPL-MCNC: 6.3 G/DL
RBC # BLD AUTO: 4.24 M/UL
SODIUM SERPL-SCNC: 140 MMOL/L
WBC # BLD AUTO: 15.96 K/UL

## 2018-04-29 PROCEDURE — 25000003 PHARM REV CODE 250: Performed by: STUDENT IN AN ORGANIZED HEALTH CARE EDUCATION/TRAINING PROGRAM

## 2018-04-29 PROCEDURE — 97530 THERAPEUTIC ACTIVITIES: CPT

## 2018-04-29 PROCEDURE — 83735 ASSAY OF MAGNESIUM: CPT

## 2018-04-29 PROCEDURE — 80053 COMPREHEN METABOLIC PANEL: CPT

## 2018-04-29 PROCEDURE — 84100 ASSAY OF PHOSPHORUS: CPT

## 2018-04-29 PROCEDURE — 85025 COMPLETE CBC W/AUTO DIFF WBC: CPT

## 2018-04-29 PROCEDURE — G8987 SELF CARE CURRENT STATUS: HCPCS | Mod: CK

## 2018-04-29 PROCEDURE — 97166 OT EVAL MOD COMPLEX 45 MIN: CPT

## 2018-04-29 PROCEDURE — 36415 COLL VENOUS BLD VENIPUNCTURE: CPT

## 2018-04-29 PROCEDURE — 20600001 HC STEP DOWN PRIVATE ROOM

## 2018-04-29 PROCEDURE — 99232 SBSQ HOSP IP/OBS MODERATE 35: CPT | Mod: GC,,, | Performed by: INTERNAL MEDICINE

## 2018-04-29 PROCEDURE — 63600175 PHARM REV CODE 636 W HCPCS: Performed by: STUDENT IN AN ORGANIZED HEALTH CARE EDUCATION/TRAINING PROGRAM

## 2018-04-29 PROCEDURE — 25000003 PHARM REV CODE 250: Performed by: INTERNAL MEDICINE

## 2018-04-29 PROCEDURE — G8988 SELF CARE GOAL STATUS: HCPCS | Mod: CI

## 2018-04-29 PROCEDURE — 63600175 PHARM REV CODE 636 W HCPCS: Performed by: INTERNAL MEDICINE

## 2018-04-29 RX ORDER — FUROSEMIDE 10 MG/ML
40 INJECTION INTRAMUSCULAR; INTRAVENOUS ONCE
Status: COMPLETED | OUTPATIENT
Start: 2018-04-29 | End: 2018-04-29

## 2018-04-29 RX ORDER — AMOXICILLIN 250 MG
1 CAPSULE ORAL DAILY PRN
Status: DISCONTINUED | OUTPATIENT
Start: 2018-04-29 | End: 2018-05-02 | Stop reason: HOSPADM

## 2018-04-29 RX ORDER — METOPROLOL TARTRATE 25 MG/1
25 TABLET, FILM COATED ORAL 2 TIMES DAILY
Status: DISCONTINUED | OUTPATIENT
Start: 2018-04-29 | End: 2018-04-30

## 2018-04-29 RX ADMIN — LEVETIRACETAM 750 MG: 750 TABLET ORAL at 08:04

## 2018-04-29 RX ADMIN — ATORVASTATIN CALCIUM 40 MG: 20 TABLET, FILM COATED ORAL at 08:04

## 2018-04-29 RX ADMIN — SODIUM BICARBONATE 650 MG TABLET 650 MG: at 03:04

## 2018-04-29 RX ADMIN — DRONABINOL 5 MG: 5 CAPSULE ORAL at 04:04

## 2018-04-29 RX ADMIN — FUROSEMIDE 40 MG: 10 INJECTION, SOLUTION INTRAVENOUS at 11:04

## 2018-04-29 RX ADMIN — PANCRELIPASE 1 CAPSULE: 60000; 12000; 38000 CAPSULE, DELAYED RELEASE PELLETS ORAL at 04:04

## 2018-04-29 RX ADMIN — PANCRELIPASE 1 CAPSULE: 60000; 12000; 38000 CAPSULE, DELAYED RELEASE PELLETS ORAL at 12:04

## 2018-04-29 RX ADMIN — METOPROLOL TARTRATE 25 MG: 25 TABLET ORAL at 08:04

## 2018-04-29 RX ADMIN — AMLODIPINE BESYLATE 5 MG: 5 TABLET ORAL at 08:04

## 2018-04-29 RX ADMIN — DRONABINOL 5 MG: 5 CAPSULE ORAL at 05:04

## 2018-04-29 RX ADMIN — APIXABAN 5 MG: 5 TABLET, FILM COATED ORAL at 08:04

## 2018-04-29 RX ADMIN — PREDNISONE 60 MG: 20 TABLET ORAL at 08:04

## 2018-04-29 RX ADMIN — SODIUM BICARBONATE 650 MG TABLET 650 MG: at 08:04

## 2018-04-29 RX ADMIN — THERA TABS 1 TABLET: TAB at 08:04

## 2018-04-29 RX ADMIN — CEFEPIME 1 G: 1 INJECTION, POWDER, FOR SOLUTION INTRAMUSCULAR; INTRAVENOUS at 05:04

## 2018-04-29 RX ADMIN — PANCRELIPASE 1 CAPSULE: 60000; 12000; 38000 CAPSULE, DELAYED RELEASE PELLETS ORAL at 08:04

## 2018-04-29 NOTE — PLAN OF CARE
Problem: Occupational Therapy Goal  Goal: Occupational Therapy Goal  Goals to be met by: 5/13/18     Patient will increase functional independence with ADLs by performing:    UE Dressing with [place].  LE Dressing with Stand-by Assistance.  Grooming while standing at sink with Supervision.  Toileting from toilet with [place] for hygiene and clothing management.   Toilet transfer to toilet with Supervision.    Outcome: Ongoing (interventions implemented as appropriate)  Eval completed; goals established    Comments: Initiate OT LUI Orantes OT  4/29/2018

## 2018-04-29 NOTE — ASSESSMENT & PLAN NOTE
-Urine cx revealing for enterococcus, though may be asymptomatic bacteriuria as patient is without urinary complaint  -Will narrow antibiotic coverage today

## 2018-04-29 NOTE — PROGRESS NOTES
[facility]  Hematology/Oncology  Progress Note    Patient Name: Naty St  Admission Date: 4/27/2018  Hospital Length of Stay: 2 days  Code Status: Full Code     Subjective:     HPI:  The patient is a 72 y.o. female with lung cancer, HTN, breast cancer,HFpEF, and stroke in October 2017 who presents to the ED with a complaint of fatigue and worsening DELA CRUZ.  Reports that she has not been able to ambulate secondary to fatigue for the past week.   patient states she is able to stand but is too weak/fatigued to actually walk. Denies focal deficits. Son notes appetite has been decreased, but pt ate well tonight.  Pt diagnosed PNA 4/9 and was discharged on 4/16 on 3L oxygen.  She is on blood thinners due to blood clot two years ago in her LLE.  Son also notes slower cognitive function and states that she asked what her 's name was earlier today, which is not normal for her. Her DELA CRUZ has also been worsening for a week. She becomes very dyspneic with minimal movement in bed. She denies fevers and chills at home, dysuria, diarrhea.    Oncologic History:  Ms. Naty St was admitted to the hospital between 01/25/2016 and 01/28/2016. She has a history of pancreatic cancer 17 years ago, breast cancer and meningioma, presented to the hospital complaining of loss of consciousness. The patient apparently was in her normal state of health and she stood up to go to the bathroom and fell to the floor. The patient's daughter helped the mother up in the bathroom and noted that her mother's upper extremities were shaking and eye rolling. No reports of bowel or bladder incontinence, tongue biting or rolling. The second episode lasted about four minutes and she was extremely lethargic following that. Apparently, the patient had a meningioma resection done in the past; however, recently, underwent neurosurgical resection with Dr. 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    Additional immunohistochemical stains were performed which revealed the tumor cells to be are positive for TTF1 and negative for ER and GCDFP. The morphology and TTF1 positivity are most consistent with lung primary.    Of note, imaging scan at the end of January 2016 revealed a mass in the lung at 2 cm in the medial aspect of the apical segment of the right upper lobe abutting the mediastinum at the level of the azygous vein and abutting and possibly encasing the segmental bronchi and vessels of the apical segment of the right upper lobe and no pleural fluid was present. Also, there is an enlarged right paratracheal lymph node. No evidence of any metastatic disease at the pancreatic site with postoperative changes post Whipple disease  Her PET Scan from 2/15/16 reveal "Hypermetabolic mass in the right lung apex consistent with a primary malignancy. Hypermetabolic mediastinal lymph nodes consistent with metastatic disease. Right sacral hypermetabolic lesion consistent with metastatic disease, noting additional mildly sclerotic lesions in multiple vertebral bodies which do not demonstrate abnormal hypermetabolism  She underwent IR bone biopsy which revealed metastatic adenocarcinoma of lung origin. 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  She has completed focal RT to brain lesions in March 2016.    PET scan from 6/6/16 shows "Dramatic almost complete response to therapy."  Lovenox started after US lower ext 6/7/16 shows Acute complete occlusion of one of the left posterior tibial vein.Remote partial thrombus of the proximal left superficial femoral vein.  She is on Tarceva.   12/6/16 PET scan reveals "Stable right upper lobe lesion and right hilar lymph node. No new lesions identified. Trace left pleural effusion".  1/27/17 Renal u/s "Medical renal disease. Nonobstructive right nephrolithiasis"  1/31/17 PET - "Right " "upper lobe nodule and right hilar lymph node similar and very low grade activity.  There is no definite evidence of recurrence."   11/9/17 PET "In this patient with history of lung cancer, there is interval increase in size and hypermetabolism of a right upper lobe lung lesion concerning for recurrent disease. Additionally, there is interval appearance of a hypermetabolic paratracheal lymph node suspicious for metastatic disease"         She progressed on Tarceva She underwent EBUS on 12/5/17. Pathology revealed adenocarcinoma but T790m could not be done as quantity was not insufficient. Her PET scan from 1/30/18 revealed The patient's previously identified abnormal lesions is slightly greater uptake of FDG on today's study compared with prior exam. These findings are concerning for progression of disease"    Interval History: NAEON. Dispo to [facility]    Oncology Treatment Plan:   [No treatment plan]    Medications:  Continuous Infusions:  Scheduled Meds:   amLODIPine  5 mg Oral Daily    apixaban  5 mg Oral BID    atorvastatin  40 mg Oral Daily    ceFEPime (MAXIPIME) IVPB  1 g Intravenous Q24H    dronabinol  5 mg Oral BID AC    levETIRAcetam  750 mg Oral BID    lipase-protease-amylase 12,000-38,000-60,000 units  1 capsule Oral TID WM    metoprolol tartrate  25 mg Oral BID    multivitamin  1 tablet Oral Daily    predniSONE  60 mg Oral Daily    sodium bicarbonate  650 mg Oral TID     PRN Meds:acetaminophen, albuterol-ipratropium 2.5mg-0.5mg/3mL, dextrose 50%, dextrose 50%, glucagon (human recombinant), glucose, glucose, LORazepam, ondansetron, sodium chloride 0.9%     Review of Systems   Constitutional: Positive for fatigue. Negative for chills and fever.   Respiratory: Positive for cough and shortness of breath. Negative for chest tightness.    Cardiovascular: Positive for leg swelling. Negative for chest pain.   Gastrointestinal: Negative for abdominal pain, constipation, diarrhea and vomiting. "   Genitourinary: Negative for difficulty urinating and dysuria.   Skin: Positive for color change and rash.   Neurological: Positive for weakness and light-headedness. Negative for syncope.     Objective:     Vital Signs (Most Recent):  Temp: 98.3 °F (36.8 °C) (04/29/18 0520)  Pulse: 101 (04/29/18 0520)  Resp: 20 (04/28/18 1920)  BP: 137/70 (04/29/18 0520)  SpO2: 98 % (04/29/18 0520) Vital Signs (24h Range):  Temp:  [97.8 °F (36.6 °C)-98.3 °F (36.8 °C)] 98.3 °F (36.8 °C)  Pulse:  [] 101  Resp:  [16-20] 20  SpO2:  [92 %-100 %] 98 %  BP: (129-138)/(61-70) 137/70     Weight: 50.4 kg (111 lb 3.2 oz)  Body mass index is 18.5 kg/m².  Body surface area is 1.52 meters squared.      Intake/Output Summary (Last 24 hours) at 04/29/18 0736  Last data filed at 04/29/18 0500   Gross per 24 hour   Intake             1200 ml   Output             1800 ml   Net             -600 ml       Physical Exam   Constitutional: She is oriented to person, place, and time. She appears well-developed and well-nourished. No distress.   HENT:   Head: Normocephalic and atraumatic.   Eyes: Conjunctivae are normal.   Neck: Normal range of motion. Neck supple.   Cardiovascular: Normal rate and regular rhythm.    Pulmonary/Chest: She has decreased breath sounds. She has no wheezes. She has rales.   Coarse breath sounds    Abdominal: Soft. Bowel sounds are normal. She exhibits no distension. There is no tenderness.   Musculoskeletal: She exhibits edema. She exhibits no tenderness.   Neurological: She is alert and oriented to person, place, and time.   No gross focal deficits   Skin: Skin is warm and dry. She is not diaphoretic. There is erythema. There is pallor.   Vitals reviewed.      Significant Labs:   Recent Results (from the past 24 hour(s))   Comprehensive Metabolic Panel (CMP)    Collection Time: 04/29/18  5:52 AM   Result Value Ref Range    Sodium 140 136 - 145 mmol/L    Potassium 4.4 3.5 - 5.1 mmol/L    Chloride 103 95 - 110 mmol/L    CO2  30 (H) 23 - 29 mmol/L    Glucose 110 70 - 110 mg/dL    BUN, Bld 25 (H) 8 - 23 mg/dL    Creatinine 1.7 (H) 0.5 - 1.4 mg/dL    Calcium 9.0 8.7 - 10.5 mg/dL    Total Protein 6.3 6.0 - 8.4 g/dL    Albumin 2.3 (L) 3.5 - 5.2 g/dL    Total Bilirubin 0.4 0.1 - 1.0 mg/dL    Alkaline Phosphatase 87 55 - 135 U/L    AST 17 10 - 40 U/L    ALT 14 10 - 44 U/L    Anion Gap 7 (L) 8 - 16 mmol/L    eGFR if African American 34.0 (A) >60 mL/min/1.73 m^2    eGFR if non African American 29.5 (A) >60 mL/min/1.73 m^2   Magnesium    Collection Time: 04/29/18  5:52 AM   Result Value Ref Range    Magnesium 2.1 1.6 - 2.6 mg/dL   Phosphorus    Collection Time: 04/29/18  5:52 AM   Result Value Ref Range    Phosphorus 3.3 2.7 - 4.5 mg/dL   CBC with Automated Differential    Collection Time: 04/29/18  5:52 AM   Result Value Ref Range    WBC 15.96 (H) 3.90 - 12.70 K/uL    RBC 4.24 4.00 - 5.40 M/uL    Hemoglobin 10.9 (L) 12.0 - 16.0 g/dL    Hematocrit 36.5 (L) 37.0 - 48.5 %    MCV 86 82 - 98 fL    MCH 25.7 (L) 27.0 - 31.0 pg    MCHC 29.9 (L) 32.0 - 36.0 g/dL    RDW 18.2 (H) 11.5 - 14.5 %    Platelets 307 150 - 350 K/uL    MPV 10.0 9.2 - 12.9 fL    Immature Granulocytes 0.9 (H) 0.0 - 0.5 %    Gran # (ANC) 13.1 (H) 1.8 - 7.7 K/uL    Immature Grans (Abs) 0.14 (H) 0.00 - 0.04 K/uL    Lymph # 1.5 1.0 - 4.8 K/uL    Mono # 1.2 (H) 0.3 - 1.0 K/uL    Eos # 0.1 0.0 - 0.5 K/uL    Baso # 0.04 0.00 - 0.20 K/uL    nRBC 0 0 /100 WBC    Gran% 82.0 (H) 38.0 - 73.0 %    Lymph% 9.3 (L) 18.0 - 48.0 %    Mono% 7.2 4.0 - 15.0 %    Eosinophil% 0.3 0.0 - 8.0 %    Basophil% 0.3 0.0 - 1.9 %    Differential Method Automated      Diagnostic Results:  No new imaging or procedures to review since prior assessment    Assessment/Plan:     * Acute on chronic respiratory failure with hypoxia    - unclear cause at this time. DDx includes worsening lung cancer, PNA, or volume overload  - CXR with worsening bilateral infiltrates, however BNP wnl  - lasix 20 mg IV once in Ed and 1 dose  40mg IV given yesterday 04/28 w/ 1800cc UOP, will continue to monitor  - stephen prn  -Pulm has evaluated, appreciate their assistance  - CT chest ordered reveling for worsening disease  - placed on cefepime, de-escalate as studies result      Generalized weakness    Improving   Patient has had an acute on chronic worsening of weakness and debility in the last week.   -CT head unrevealing for acute process  -Possibly related to UTI  -PT/OT  -recommend GOC      Metabolic encephalopathy    Improving  Son reports some difficulty with word finding after stroke but has had much more difficulty in past week  -CT head to r/o stroke, no bleed identified      Debility    PT/OT      UTI (urinary tract infection)    -Urine cx revealing for enterococcus, though may be asymptomatic bacteriuria as patient is without urinary complaint  -Will narrow antibiotic coverage today       Diastolic dysfunction    Stable  -lasix 40mg IV x1 dose yesterday 04/28 w/ 1800 cc UOP      History of stroke    - cont atorvastatin      Seizure disorder    - cont home dose keppra      Anticoagulant long-term use    Patient on apixiban for DVT diagnosed two years ago in her LLE  Consider switching to lovenox in setting of cancer      CKD (chronic kidney disease) stage 3, GFR 30-59 ml/min    Baseline, stable      Essential hypertension    Amlodipine 5mg daily restarted  -Lopressor 25mg BID added 04/29/2018 for HTN and persistent tachycardia as well         Staff attestation to follow, treatment plan not yet discussed with attending physician.    Maurisio Bergeron MD  Hematology/Oncology, PGY-1  [facility]

## 2018-04-29 NOTE — ASSESSMENT & PLAN NOTE
- unclear cause at this time. DDx includes worsening lung cancer, PNA, or volume overload  - CXR with worsening bilateral infiltrates, however BNP wnl  - lasix 20 mg IV once in Ed and 1 dose 40mg IV given yesterday 04/28 w/ 1800cc UOP, will continue to monitor  - stephen prn  -Pulm has evaluated, appreciate their assistance  - CT chest ordered reveling for worsening disease  - placed on cefepime, de-escalate as studies result

## 2018-04-29 NOTE — SUBJECTIVE & OBJECTIVE
Interval History: NAEONBirgit Adrian has been made DNR, consult to hospice. Dispo pending     Oncology Treatment Plan:   [No treatment plan]    Medications:  Continuous Infusions:  Scheduled Meds:   amLODIPine  5 mg Oral Daily    apixaban  5 mg Oral BID    atorvastatin  40 mg Oral Daily    ceFEPime (MAXIPIME) IVPB  1 g Intravenous Q24H    dronabinol  5 mg Oral BID AC    levETIRAcetam  750 mg Oral BID    lipase-protease-amylase 12,000-38,000-60,000 units  1 capsule Oral TID WM    metoprolol tartrate  25 mg Oral BID    multivitamin  1 tablet Oral Daily    predniSONE  60 mg Oral Daily    sodium bicarbonate  650 mg Oral TID     PRN Meds:acetaminophen, albuterol-ipratropium 2.5mg-0.5mg/3mL, dextrose 50%, dextrose 50%, glucagon (human recombinant), glucose, glucose, LORazepam, ondansetron, sodium chloride 0.9%     Review of Systems   Constitutional: Positive for fatigue. Negative for chills and fever.   Respiratory: Positive for cough and shortness of breath. Negative for chest tightness.    Cardiovascular: Positive for leg swelling. Negative for chest pain.   Gastrointestinal: Negative for abdominal pain, constipation, diarrhea and vomiting.   Genitourinary: Negative for difficulty urinating and dysuria.   Skin: Positive for color change and rash.   Neurological: Positive for weakness and light-headedness. Negative for syncope.     Objective:     Vital Signs (Most Recent):  Temp: 98.3 °F (36.8 °C) (04/29/18 0520)  Pulse: 101 (04/29/18 0520)  Resp: 20 (04/28/18 1920)  BP: 137/70 (04/29/18 0520)  SpO2: 98 % (04/29/18 0520) Vital Signs (24h Range):  Temp:  [97.8 °F (36.6 °C)-98.3 °F (36.8 °C)] 98.3 °F (36.8 °C)  Pulse:  [] 101  Resp:  [16-20] 20  SpO2:  [92 %-100 %] 98 %  BP: (129-138)/(61-70) 137/70     Weight: 50.4 kg (111 lb 3.2 oz)  Body mass index is 18.5 kg/m².  Body surface area is 1.52 meters squared.      Intake/Output Summary (Last 24 hours) at 04/29/18 5797  Last data filed at 04/29/18 0185   Gross  per 24 hour   Intake             1200 ml   Output             1800 ml   Net             -600 ml       Physical Exam   Constitutional: She is oriented to person, place, and time. She appears well-developed and well-nourished. No distress.   HENT:   Head: Normocephalic and atraumatic.   Eyes: Conjunctivae are normal.   Neck: Normal range of motion. Neck supple.   Cardiovascular: Normal rate and regular rhythm.    Pulmonary/Chest: She has decreased breath sounds. She has no wheezes. She has rales.   Coarse breath sounds    Abdominal: Soft. Bowel sounds are normal. She exhibits no distension. There is no tenderness.   Musculoskeletal: She exhibits edema. She exhibits no tenderness.   Neurological: She is alert and oriented to person, place, and time.   No gross focal deficits   Skin: Skin is warm and dry. She is not diaphoretic. There is erythema. There is pallor.   Vitals reviewed.      Significant Labs:   Recent Results (from the past 24 hour(s))   Comprehensive Metabolic Panel (CMP)    Collection Time: 04/29/18  5:52 AM   Result Value Ref Range    Sodium 140 136 - 145 mmol/L    Potassium 4.4 3.5 - 5.1 mmol/L    Chloride 103 95 - 110 mmol/L    CO2 30 (H) 23 - 29 mmol/L    Glucose 110 70 - 110 mg/dL    BUN, Bld 25 (H) 8 - 23 mg/dL    Creatinine 1.7 (H) 0.5 - 1.4 mg/dL    Calcium 9.0 8.7 - 10.5 mg/dL    Total Protein 6.3 6.0 - 8.4 g/dL    Albumin 2.3 (L) 3.5 - 5.2 g/dL    Total Bilirubin 0.4 0.1 - 1.0 mg/dL    Alkaline Phosphatase 87 55 - 135 U/L    AST 17 10 - 40 U/L    ALT 14 10 - 44 U/L    Anion Gap 7 (L) 8 - 16 mmol/L    eGFR if African American 34.0 (A) >60 mL/min/1.73 m^2    eGFR if non African American 29.5 (A) >60 mL/min/1.73 m^2   Magnesium    Collection Time: 04/29/18  5:52 AM   Result Value Ref Range    Magnesium 2.1 1.6 - 2.6 mg/dL   Phosphorus    Collection Time: 04/29/18  5:52 AM   Result Value Ref Range    Phosphorus 3.3 2.7 - 4.5 mg/dL   CBC with Automated Differential    Collection Time: 04/29/18  5:52  AM   Result Value Ref Range    WBC 15.96 (H) 3.90 - 12.70 K/uL    RBC 4.24 4.00 - 5.40 M/uL    Hemoglobin 10.9 (L) 12.0 - 16.0 g/dL    Hematocrit 36.5 (L) 37.0 - 48.5 %    MCV 86 82 - 98 fL    MCH 25.7 (L) 27.0 - 31.0 pg    MCHC 29.9 (L) 32.0 - 36.0 g/dL    RDW 18.2 (H) 11.5 - 14.5 %    Platelets 307 150 - 350 K/uL    MPV 10.0 9.2 - 12.9 fL    Immature Granulocytes 0.9 (H) 0.0 - 0.5 %    Gran # (ANC) 13.1 (H) 1.8 - 7.7 K/uL    Immature Grans (Abs) 0.14 (H) 0.00 - 0.04 K/uL    Lymph # 1.5 1.0 - 4.8 K/uL    Mono # 1.2 (H) 0.3 - 1.0 K/uL    Eos # 0.1 0.0 - 0.5 K/uL    Baso # 0.04 0.00 - 0.20 K/uL    nRBC 0 0 /100 WBC    Gran% 82.0 (H) 38.0 - 73.0 %    Lymph% 9.3 (L) 18.0 - 48.0 %    Mono% 7.2 4.0 - 15.0 %    Eosinophil% 0.3 0.0 - 8.0 %    Basophil% 0.3 0.0 - 1.9 %    Differential Method Automated      Diagnostic Results:  No new imaging or procedures to review since prior assessment

## 2018-04-29 NOTE — PLAN OF CARE
Problem: Patient Care Overview  Goal: Plan of Care Review  Outcome: Ongoing (interventions implemented as appropriate)  Fall, pressure ulcer, and infection precautions continued. Neuro checks monitored Q4hrs. Therapy worked with patient, up in the chair in the afternoon. Telemetry and continuous pulse ox monitoring discontinued. Family at bedside. Augmentin iniatied. 2D echo and chest x-ray completed. Solumedrol initiated TID, prednisone discontinued. Patient stable, will continue to monitor.

## 2018-04-29 NOTE — ASSESSMENT & PLAN NOTE
Amlodipine 5mg daily restarted  -Lopressor 25mg BID added 04/29/2018 for HTN and persistent tachycardia as well

## 2018-04-29 NOTE — ASSESSMENT & PLAN NOTE
Patient on apixiban for DVT diagnosed two years ago in her LLE  Consider switching to lovenox in setting of cancer

## 2018-04-29 NOTE — PT/OT/SLP EVAL
Occupational Therapy   Evaluation    Name: Naty St  MRN: [id number]  Admitting Diagnosis:  Acute on chronic respiratory failure with hypoxia      Recommendations:     Discharge Recommendations: nursing facility, skilled  Discharge Equipment Recommendations:  none  Barriers to discharge:  None    History:     Occupational Profile:  Living Environment: Pt lives with  in 1SH with 8STE and BHR. Home has tub/shower with bath bench and grab bars. BSC placed over toilet  Previous level of function: Son reports pt has been needing a lot of assist with ADLs in the last week. Pt was d/c from hospital 4/16 and pt/son reports she was only needing a little help with ADLs and was able to complete grooming independently, however began to decline on 4/25. Pt admitted to hospital on 4/26 after a fall in her room resulting in pt not being able to get off the floor. Son reports pt has hx of cerebellar stroke in October 2017 for which she participated in rehab at [place]. Pt reports she was not using any AD for ambulation, however began using it recently due to weakness. Pt's  drives and son assists with cooking/cleaning.   Roles and Routines: wife, mother  Equipment Owned:  walker, rolling, bedside commode, bath bench  Assistance upon Discharge:  and son to assist as needed following d/c    Past Medical History:   Diagnosis Date    Anticoagulant long-term use     Blood clot in vein 06/2016    Breast cancer 1994    Cataract     Hypertension     Pancreatic cancer 1998    Posterior capsular opacification, left eye     Psychiatric problem     Seizures 01/25/2016    Stroke     Therapy        Past Surgical History:   Procedure Laterality Date    BONE BIOSPY  3/9/16    BRAIN SURGERY  1/12/16    tumor removal 1/12/16    BREAST LUMPECTOMY  1998    left    CATARACT EXTRACTION Bilateral 2004    yag OU    CHOLECYSTECTOMY  1998    COLONOSCOPY N/A 11/13/2015    Procedure: COLONOSCOPY;  Surgeon:  Alex Carmona MD;  Location: [facility] (4TH FLR);  Service: Endoscopy;  Laterality: N/A;  2 year f/u    cysto and right ureteral stent  4/27/12    ecoli  2010    removal of blockage, done throat, then through the liver.    HYSTERECTOMY  1974    KIDNEY STONE SURGERY  2010--laser    left eswl  6/20/12    OOPHORECTOMY  4/25/2012    Laparoscopic BSO, lysis of adhesions, cystoscopy greater than 35mins     WHIPPLE PROCEDURE W/ LAPAROSCOPY  1998       Subjective     Chief Complaint: weakness  Patient/Family stated goals: strengthening  Communicated with: RN prior to session.  Pain/Comfort:  · Pain Rating 1: 0/10  · Pain Addressed 1: Reposition, Distraction  · Pain Rating Post-Intervention 1: 0/10    Patients cultural, spiritual, Oriental orthodox conflicts given the current situation: none reported    Objective:     Patient found with: telemetry, oxygen    General Precautions: Standard, fall   Orthopedic Precautions:N/A   Braces: N/A     Occupational Performance:    Bed Mobility:    Pt found UIC    Functional Mobility/Transfers:  · Patient completed Sit <> Stand Transfer with contact guard assistance with rolling walker; cues for hand placement and use of RW during t/f  · Patient completed Bed <> Chair Transfer using Step Transfer technique with contact guard assistance with rolling walker; cues for hand placement and use of RW during t/f  · Patient completed Toilet Transfer Step Transfer technique with contact guard assistance with  grab bars and rolling walker; cues for hand placement and use of RW/grab bars during t/f  · Functional Mobility: Pt ambulate chair<>bathroom with CGA using RW    Activities of Daily Living:  · UB Dressing: independence to doff robe while seated UIC    Cognitive/Visual Perceptual:  Cognitive/Psychosocial Skills:     -       Oriented to: Person, Place and Situation   -       Follows Commands/attention:Follows two-step commands  -       Communication: clear/fluent; pt has occasional difficulty  with word finding 2/2 hx of cerebellar stroke  -       Memory: No Deficits noted  -       Safety awareness/insight to disability: intact   -       Mood/Affect/Coping skills/emotional control: Appropriate to situation  Visual/Perceptual:      -Intact    Physical Exam:  Balance:    -       good sitting balance; good static/dynamic standing balance with use of RW  Postural examination/scapula alignment:    -       Rounded shoulders  -       Forward head  Skin integrity: Visible skin intact  Edema:  None noted  Sensation:    -       Impaired  Pt reports occasional numbness/tingling in L fingertips  Dominant hand:    -       R hand  Upper Extremity Range of Motion:     -       Right Upper Extremity: WFL  -       Left Upper Extremity: WFL  Upper Extremity Strength:    -       Right Upper Extremity: WFL  -       Left Upper Extremity: WFL   Strength:    -       Right Upper Extremity: WFL  -       Left Upper Extremity: WFL  Fine Motor Coordination:    -       Intact; pt/son report occasional tremors with prolonged grasp of an object  Gross motor coordination:   WFL    Patient left up in chair with all lines intact, call button in reach, RN notified and son present    [facility] 6 Click:  AMPA Total Score: 15    Treatment & Education:  *Pt/son educated on role of OT/POC  *Pt/son educated on differences between SNF and rehab  *Pt educated on use of RW and grab bars during t/f  *Pt/son educated on pt need for CGA from nursing when in the bathroom; son concerned about pt's balance when performing toilet hygiene, RN informed and OT request nursing presence during toileting for safety as pt no longer wants to use the BSC  *White board/communication board updated  Education:    Assessment:     Naty St is a 72 y.o. female with a medical diagnosis of Acute on chronic respiratory failure with hypoxia.  She presents with generalized weakness and balance deficits impairing safe and independent functional mobility and self  "care.  Performance deficits affecting function are weakness, impaired endurance, impaired functional mobilty, impaired self care skills, impaired balance, gait instability, impaired cardiopulmonary response to activity.      Rehab Prognosis:  Good; patient would benefit from acute skilled OT services to address these deficits and reach maximum level of function.         Clinical Decision Makin.  OT Mod:  "Pt evaluation falls under moderate complexity for evaluation coding due to identification of 3-5 performance deficits noted as stated above. Eval required Min/Mod assistance to complete on this date and detailed assessment(s) were utilized. Moreover, an expanded review of history and occupational profile obtained with additional review of cognitive, physical and psychosocial hx."     Plan:     Patient to be seen 4 x/week to address the above listed problems via self-care/home management, therapeutic exercises, therapeutic activities  · Plan of Care Expires: 18  · Plan of Care Reviewed with: patient, son    This Plan of care has been discussed with the patient who was involved in its development and understands and is in agreement with the identified goals and treatment plan    GOALS:    Occupational Therapy Goals        Problem: Occupational Therapy Goal    Goal Priority Disciplines Outcome Interventions   Occupational Therapy Goal     OT, PT/OT Ongoing (interventions implemented as appropriate)    Description:  Goals to be met by: 18     Patient will increase functional independence with ADLs by performing:    UE Dressing with [place].  LE Dressing with Stand-by Assistance.  Grooming while standing at sink with Supervision.  Toileting from toilet with [place] for hygiene and clothing management.   Toilet transfer to toilet with Supervision.                      Time Tracking:     OT Date of Treatment: 18  OT Start Time: 957  OT Stop Time: 0  OT Total Time (min): 23 " min    Billable Minutes:Evaluation 10  Therapeutic Activity 13    Linda Orantes OT  4/29/2018

## 2018-04-29 NOTE — ASSESSMENT & PLAN NOTE
Improving   Patient has had an acute on chronic worsening of weakness and debility in the last week.   -CT head unrevealing for acute process  -Possibly related to UTI  -PT/OT  -recommend GOC

## 2018-04-29 NOTE — ASSESSMENT & PLAN NOTE
Improving  Son reports some difficulty with word finding after stroke but has had much more difficulty in past week  -CT head to r/o stroke, no bleed identified

## 2018-04-30 ENCOUNTER — OUTPATIENT CASE MANAGEMENT (OUTPATIENT)
Dept: ADMINISTRATIVE | Facility: OTHER | Age: 73
End: 2018-04-30

## 2018-04-30 PROBLEM — R91.8 PULMONARY INFILTRATES ON CXR: Status: ACTIVE | Noted: 2018-04-30

## 2018-04-30 LAB
ALBUMIN SERPL BCP-MCNC: 2.2 G/DL
ALP SERPL-CCNC: 76 U/L
ALT SERPL W/O P-5'-P-CCNC: 12 U/L
ANION GAP SERPL CALC-SCNC: 7 MMOL/L
AST SERPL-CCNC: 15 U/L
BACTERIA UR CULT: NORMAL
BASOPHILS # BLD AUTO: 0.03 K/UL
BASOPHILS NFR BLD: 0.2 %
BILIRUB SERPL-MCNC: 0.3 MG/DL
BUN SERPL-MCNC: 26 MG/DL
CALCIUM SERPL-MCNC: 8.6 MG/DL
CHLORIDE SERPL-SCNC: 104 MMOL/L
CO2 SERPL-SCNC: 29 MMOL/L
CREAT SERPL-MCNC: 1.6 MG/DL
DIFFERENTIAL METHOD: ABNORMAL
EOSINOPHIL # BLD AUTO: 0.2 K/UL
EOSINOPHIL NFR BLD: 1.5 %
ERYTHROCYTE [DISTWIDTH] IN BLOOD BY AUTOMATED COUNT: 18.2 %
EST. GFR  (AFRICAN AMERICAN): 36.6 ML/MIN/1.73 M^2
EST. GFR  (NON AFRICAN AMERICAN): 31.7 ML/MIN/1.73 M^2
ESTIMATED PA SYSTOLIC PRESSURE: 28.09
GLUCOSE SERPL-MCNC: 124 MG/DL
HCT VFR BLD AUTO: 35 %
HGB BLD-MCNC: 10.5 G/DL
IMM GRANULOCYTES # BLD AUTO: 0.16 K/UL
IMM GRANULOCYTES NFR BLD AUTO: 1.1 %
LYMPHOCYTES # BLD AUTO: 1.7 K/UL
LYMPHOCYTES NFR BLD: 11.6 %
MAGNESIUM SERPL-MCNC: 2.3 MG/DL
MCH RBC QN AUTO: 26 PG
MCHC RBC AUTO-ENTMCNC: 30 G/DL
MCV RBC AUTO: 87 FL
MITRAL VALVE REGURGITATION: NORMAL
MONOCYTES # BLD AUTO: 1.5 K/UL
MONOCYTES NFR BLD: 10.3 %
NEUTROPHILS # BLD AUTO: 11.2 K/UL
NEUTROPHILS NFR BLD: 75.3 %
NRBC BLD-RTO: 0 /100 WBC
PHOSPHATE SERPL-MCNC: 2.8 MG/DL
PLATELET # BLD AUTO: 303 K/UL
PMV BLD AUTO: 10.1 FL
POTASSIUM SERPL-SCNC: 4.4 MMOL/L
PROT SERPL-MCNC: 5.7 G/DL
RBC # BLD AUTO: 4.04 M/UL
RETIRED EF AND QEF - SEE NOTES: 50 (ref 55–65)
SODIUM SERPL-SCNC: 140 MMOL/L
TRICUSPID VALVE REGURGITATION: NORMAL
WBC # BLD AUTO: 14.87 K/UL

## 2018-04-30 PROCEDURE — 83735 ASSAY OF MAGNESIUM: CPT

## 2018-04-30 PROCEDURE — 25000003 PHARM REV CODE 250: Performed by: STUDENT IN AN ORGANIZED HEALTH CARE EDUCATION/TRAINING PROGRAM

## 2018-04-30 PROCEDURE — 93306 TTE W/DOPPLER COMPLETE: CPT | Mod: 26,,, | Performed by: INTERNAL MEDICINE

## 2018-04-30 PROCEDURE — 25000003 PHARM REV CODE 250: Performed by: INTERNAL MEDICINE

## 2018-04-30 PROCEDURE — 84100 ASSAY OF PHOSPHORUS: CPT

## 2018-04-30 PROCEDURE — 97110 THERAPEUTIC EXERCISES: CPT

## 2018-04-30 PROCEDURE — 97116 GAIT TRAINING THERAPY: CPT

## 2018-04-30 PROCEDURE — 80053 COMPREHEN METABOLIC PANEL: CPT

## 2018-04-30 PROCEDURE — 85025 COMPLETE CBC W/AUTO DIFF WBC: CPT

## 2018-04-30 PROCEDURE — 99232 SBSQ HOSP IP/OBS MODERATE 35: CPT | Mod: GC,,, | Performed by: INTERNAL MEDICINE

## 2018-04-30 PROCEDURE — 36415 COLL VENOUS BLD VENIPUNCTURE: CPT

## 2018-04-30 PROCEDURE — 63600175 PHARM REV CODE 636 W HCPCS: Performed by: STUDENT IN AN ORGANIZED HEALTH CARE EDUCATION/TRAINING PROGRAM

## 2018-04-30 PROCEDURE — 93306 TTE W/DOPPLER COMPLETE: CPT

## 2018-04-30 PROCEDURE — 20600001 HC STEP DOWN PRIVATE ROOM

## 2018-04-30 PROCEDURE — 63600175 PHARM REV CODE 636 W HCPCS: Performed by: INTERNAL MEDICINE

## 2018-04-30 RX ORDER — AMOXICILLIN AND CLAVULANATE POTASSIUM 500; 125 MG/1; MG/1
1 TABLET, FILM COATED ORAL 2 TIMES DAILY
Status: DISCONTINUED | OUTPATIENT
Start: 2018-04-30 | End: 2018-05-02 | Stop reason: HOSPADM

## 2018-04-30 RX ORDER — METOPROLOL SUCCINATE 50 MG/1
50 TABLET, EXTENDED RELEASE ORAL DAILY
Status: DISCONTINUED | OUTPATIENT
Start: 2018-05-01 | End: 2018-05-02 | Stop reason: HOSPADM

## 2018-04-30 RX ORDER — METHYLPREDNISOLONE SOD SUCC 125 MG
80 VIAL (EA) INJECTION 3 TIMES DAILY
Status: DISCONTINUED | OUTPATIENT
Start: 2018-04-30 | End: 2018-05-02

## 2018-04-30 RX ORDER — METOPROLOL TARTRATE 25 MG/1
25 TABLET, FILM COATED ORAL 2 TIMES DAILY
Status: COMPLETED | OUTPATIENT
Start: 2018-04-30 | End: 2018-04-30

## 2018-04-30 RX ADMIN — AMOXICILLIN AND CLAVULANATE POTASSIUM 500 MG: 500; 125 TABLET, FILM COATED ORAL at 09:04

## 2018-04-30 RX ADMIN — APIXABAN 5 MG: 5 TABLET, FILM COATED ORAL at 08:04

## 2018-04-30 RX ADMIN — PANCRELIPASE 1 CAPSULE: 60000; 12000; 38000 CAPSULE, DELAYED RELEASE PELLETS ORAL at 08:04

## 2018-04-30 RX ADMIN — METOPROLOL TARTRATE 25 MG: 25 TABLET ORAL at 08:04

## 2018-04-30 RX ADMIN — DRONABINOL 5 MG: 5 CAPSULE ORAL at 04:04

## 2018-04-30 RX ADMIN — SODIUM BICARBONATE 650 MG TABLET 650 MG: at 09:04

## 2018-04-30 RX ADMIN — AMLODIPINE BESYLATE 5 MG: 5 TABLET ORAL at 08:04

## 2018-04-30 RX ADMIN — METHYLPREDNISOLONE SODIUM SUCCINATE 80 MG: 125 INJECTION, POWDER, FOR SOLUTION INTRAMUSCULAR; INTRAVENOUS at 02:04

## 2018-04-30 RX ADMIN — METOPROLOL TARTRATE 25 MG: 25 TABLET ORAL at 09:04

## 2018-04-30 RX ADMIN — PANCRELIPASE 1 CAPSULE: 60000; 12000; 38000 CAPSULE, DELAYED RELEASE PELLETS ORAL at 05:04

## 2018-04-30 RX ADMIN — APIXABAN 5 MG: 5 TABLET, FILM COATED ORAL at 09:04

## 2018-04-30 RX ADMIN — LEVETIRACETAM 750 MG: 750 TABLET ORAL at 08:04

## 2018-04-30 RX ADMIN — ATORVASTATIN CALCIUM 40 MG: 20 TABLET, FILM COATED ORAL at 08:04

## 2018-04-30 RX ADMIN — PREDNISONE 60 MG: 20 TABLET ORAL at 08:04

## 2018-04-30 RX ADMIN — PANCRELIPASE 1 CAPSULE: 60000; 12000; 38000 CAPSULE, DELAYED RELEASE PELLETS ORAL at 12:04

## 2018-04-30 RX ADMIN — SODIUM BICARBONATE 650 MG TABLET 650 MG: at 08:04

## 2018-04-30 RX ADMIN — THERA TABS 1 TABLET: TAB at 08:04

## 2018-04-30 RX ADMIN — DRONABINOL 5 MG: 5 CAPSULE ORAL at 05:04

## 2018-04-30 RX ADMIN — SODIUM BICARBONATE 650 MG TABLET 650 MG: at 02:04

## 2018-04-30 RX ADMIN — METHYLPREDNISOLONE SODIUM SUCCINATE 80 MG: 125 INJECTION, POWDER, FOR SOLUTION INTRAMUSCULAR; INTRAVENOUS at 09:04

## 2018-04-30 RX ADMIN — LEVETIRACETAM 750 MG: 750 TABLET ORAL at 09:04

## 2018-04-30 NOTE — ASSESSMENT & PLAN NOTE
-Urine cx revealing for enterococcus faecalis, though may be asymptomatic bacteriuria as patient is without urinary complaint  -s/p 3 days of cefepime, will consider stopping antibiotic coverage vs. narrowing to Augmentin today

## 2018-04-30 NOTE — PROGRESS NOTES
Called [facility] liaeddie Barriga, ext. [phone], this afternoon and left a detailed VM message for her requesting update on the status of the SNF consult that was ordered per MDs. Patient was evaluated by PT and OT, and therapists are recommending SNF. Will continue to follow.

## 2018-04-30 NOTE — PROGRESS NOTES
[facility]  Hematology/Oncology  Progress Note    Patient Name: Naty St  Admission Date: 4/27/2018  Hospital Length of Stay: 3 days  Code Status: Full Code     Subjective:     HPI:  The patient is a 72 y.o. female with lung cancer, HTN, breast cancer,HFpEF, and stroke in October 2017 who presents to the ED with a complaint of fatigue and worsening DELA CRUZ.  Reports that she has not been able to ambulate secondary to fatigue for the past week.   patient states she is able to stand but is too weak/fatigued to actually walk. Denies focal deficits. Son notes appetite has been decreased, but pt ate well tonight.  Pt diagnosed PNA 4/9 and was discharged on 4/16 on 3L oxygen.  She is on blood thinners due to blood clot two years ago in her LLE.  Son also notes slower cognitive function and states that she asked what her 's name was earlier today, which is not normal for her. Her DELA CRUZ has also been worsening for a week. She becomes very dyspneic with minimal movement in bed. She denies fevers and chills at home, dysuria, diarrhea.    Oncologic History:  Ms. Naty St was admitted to the hospital between 01/25/2016 and 01/28/2016. She has a history of pancreatic cancer 17 years ago, breast cancer and meningioma, presented to the hospital complaining of loss of consciousness. The patient apparently was in her normal state of health and she stood up to go to the bathroom and fell to the floor. The patient's daughter helped the mother up in the bathroom and noted that her mother's upper extremities were shaking and eye rolling. No reports of bowel or bladder incontinence, tongue biting or rolling. The second episode lasted about four minutes and she was extremely lethargic following that. Apparently, the patient had a meningioma resection done in the past; however, recently, underwent neurosurgical resection with Dr. 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    Additional immunohistochemical stains were performed which revealed the tumor cells to be are positive for TTF1 and negative for ER and GCDFP. The morphology and TTF1 positivity are most consistent with lung primary.    Of note, imaging scan at the end of January 2016 revealed a mass in the lung at 2 cm in the medial aspect of the apical segment of the right upper lobe abutting the mediastinum at the level of the azygous vein and abutting and possibly encasing the segmental bronchi and vessels of the apical segment of the right upper lobe and no pleural fluid was present. Also, there is an enlarged right paratracheal lymph node. No evidence of any metastatic disease at the pancreatic site with postoperative changes post Whipple disease  Her PET Scan from 2/15/16 reveal "Hypermetabolic mass in the right lung apex consistent with a primary malignancy. Hypermetabolic mediastinal lymph nodes consistent with metastatic disease. Right sacral hypermetabolic lesion consistent with metastatic disease, noting additional mildly sclerotic lesions in multiple vertebral bodies which do not demonstrate abnormal hypermetabolism  She underwent IR bone biopsy which revealed metastatic adenocarcinoma of lung origin. 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  She has completed focal RT to brain lesions in March 2016.    PET scan from 6/6/16 shows "Dramatic almost complete response to therapy."  Lovenox started after US lower ext 6/7/16 shows Acute complete occlusion of one of the left posterior tibial vein.Remote partial thrombus of the proximal left superficial femoral vein.  She is on Tarceva.   12/6/16 PET scan reveals "Stable right upper lobe lesion and right hilar lymph node. No new lesions identified. Trace left pleural effusion".  1/27/17 Renal u/s "Medical renal disease. Nonobstructive right nephrolithiasis"  1/31/17 PET - "Right " "upper lobe nodule and right hilar lymph node similar and very low grade activity.  There is no definite evidence of recurrence."   11/9/17 PET "In this patient with history of lung cancer, there is interval increase in size and hypermetabolism of a right upper lobe lung lesion concerning for recurrent disease. Additionally, there is interval appearance of a hypermetabolic paratracheal lymph node suspicious for metastatic disease"         She progressed on Tarceva She underwent EBUS on 12/5/17. Pathology revealed adenocarcinoma but T790m could not be done as quantity was not insufficient. Her PET scan from 1/30/18 revealed The patient's previously identified abnormal lesions is slightly greater uptake of FDG on today's study compared with prior exam. These findings are concerning for progression of disease"    Interval History: TAMMY. Will obtain CXR and repeat echo today to evaluate for any possible decreased cardiac functioning 2/2 chemotherapy meds. Otherwise making slight improvement daily, now req. 3.5L NC from 4L the day prior; on 3L at home    Oncology Treatment Plan:   [No treatment plan]    Medications:  Continuous Infusions:  Scheduled Meds:   amLODIPine  5 mg Oral Daily    apixaban  5 mg Oral BID    atorvastatin  40 mg Oral Daily    dronabinol  5 mg Oral BID AC    levETIRAcetam  750 mg Oral BID    lipase-protease-amylase 12,000-38,000-60,000 units  1 capsule Oral TID WM    metoprolol tartrate  25 mg Oral BID    multivitamin  1 tablet Oral Daily    predniSONE  60 mg Oral Daily    sodium bicarbonate  650 mg Oral TID     PRN Meds:acetaminophen, albuterol-ipratropium 2.5mg-0.5mg/3mL, dextrose 50%, dextrose 50%, glucagon (human recombinant), glucose, glucose, LORazepam, ondansetron, senna-docusate 8.6-50 mg, sodium chloride 0.9%     Review of Systems   Constitutional: Positive for fatigue. Negative for chills and fever.   Respiratory: Positive for shortness of breath. Negative for chest tightness.  "   Cardiovascular: Negative for chest pain.   Gastrointestinal: Negative for abdominal pain, constipation, diarrhea and vomiting.   Genitourinary: Negative for difficulty urinating and dysuria.   Skin: Positive for color change and rash.   Neurological: Positive for light-headedness. Negative for syncope.     Objective:     Vital Signs (Most Recent):  Temp: 98.6 °F (37 °C) (04/30/18 0400)  Pulse: 72 (04/30/18 0400)  Resp: 16 (04/30/18 0400)  BP: (!) 153/76 (04/30/18 0400)  SpO2: 99 % (04/30/18 0400) Vital Signs (24h Range):  Temp:  [97.8 °F (36.6 °C)-98.7 °F (37.1 °C)] 98.6 °F (37 °C)  Pulse:  [70-90] 72  Resp:  [16-20] 16  SpO2:  [96 %-100 %] 99 %  BP: (115-157)/(60-76) 153/76     Weight: 58.9 kg (129 lb 13.6 oz)  Body mass index is 21.61 kg/m².  Body surface area is 1.64 meters squared.      Intake/Output Summary (Last 24 hours) at 04/30/18 0617  Last data filed at 04/29/18 1724   Gross per 24 hour   Intake              720 ml   Output             1650 ml   Net             -930 ml     Physical Exam   Constitutional: She is oriented to person, place, and time. She appears well-developed and well-nourished. No distress.   HENT:   Head: Normocephalic and atraumatic.   Eyes: Conjunctivae are normal.   Neck: Normal range of motion. Neck supple.   Cardiovascular: Normal rate and regular rhythm.    Pulmonary/Chest: She has decreased breath sounds. She has no wheezes. She has rales.   Coarse breath sounds    Abdominal: Soft. Bowel sounds are normal. She exhibits no distension. There is no tenderness.   Musculoskeletal: She exhibits no tenderness.   Neurological: She is alert and oriented to person, place, and time.   No gross focal deficits   Skin: Skin is warm and dry. She is not diaphoretic. There is erythema.   Vitals reviewed.    Significant Labs:   Recent Results (from the past 24 hour(s))   CBC with Automated Differential    Collection Time: 04/30/18  4:35 AM   Result Value Ref Range    WBC 14.87 (H) 3.90 - 12.70  K/uL    RBC 4.04 4.00 - 5.40 M/uL    Hemoglobin 10.5 (L) 12.0 - 16.0 g/dL    Hematocrit 35.0 (L) 37.0 - 48.5 %    MCV 87 82 - 98 fL    MCH 26.0 (L) 27.0 - 31.0 pg    MCHC 30.0 (L) 32.0 - 36.0 g/dL    RDW 18.2 (H) 11.5 - 14.5 %    Platelets 303 150 - 350 K/uL    MPV 10.1 9.2 - 12.9 fL    Immature Granulocytes 1.1 (H) 0.0 - 0.5 %    Gran # (ANC) 11.2 (H) 1.8 - 7.7 K/uL    Immature Grans (Abs) 0.16 (H) 0.00 - 0.04 K/uL    Lymph # 1.7 1.0 - 4.8 K/uL    Mono # 1.5 (H) 0.3 - 1.0 K/uL    Eos # 0.2 0.0 - 0.5 K/uL    Baso # 0.03 0.00 - 0.20 K/uL    nRBC 0 0 /100 WBC    Gran% 75.3 (H) 38.0 - 73.0 %    Lymph% 11.6 (L) 18.0 - 48.0 %    Mono% 10.3 4.0 - 15.0 %    Eosinophil% 1.5 0.0 - 8.0 %    Basophil% 0.2 0.0 - 1.9 %    Differential Method Automated      Diagnostic Results:  2D echo w/ doppler 04/30/2018:  Will f/u results when available  CXR 04/30/2018:  Will f/u results when available    Assessment/Plan:     * Acute on chronic respiratory failure with hypoxia    -Etiology remains unclear; DDx includes worsening lung cancer, PNA, or volume overload  -CXR with worsening bilateral infiltrates, however BNP wnl, will repeat CXR today 04/30  -Lasix 20 mg IV once in Ed and 1 dose 40mg IV  04/28 and 04/29 with appropriate response, will continue to monitor  -Jeni prn  -Pulm has evaluated, appreciate their assistance  -Solumedrol 80mg TID starting 04/30 per pulm   -CT chest revealing for worsening disease  -S/p 3 doses cefepime, deescalate vs discontinue antibiotic coverage today 04/30      Generalized weakness    Improving   Patient has had an acute on chronic worsening of weakness and debility in the last week.   -CT head unrevealing for acute process  -Possibly related to UTI  -PT/OT  -recommend GO      Metabolic encephalopathy    Improving  Son reports some difficulty with word finding after stroke but has had much more difficulty in past week  -CT head to r/o stroke, no bleed identified      Debility    PT/OT to reeval today  04/30      UTI (urinary tract infection)    -Urine cx revealing for enterococcus faecalis, though may be asymptomatic bacteriuria as patient is without urinary complaint  -s/p 3 days of cefepime, will consider stopping antibiotic coverage vs. narrowing to Augmentin today       Diastolic dysfunction    Stable  -s/p lasix 40mg IV x1 dose yesterday 04/28 w/ 1800 cc UOP and 04/29 w/ 1700 cc UOP  -Will repeat echo today to evaluate for any possible decreased cardiac functioning 2/2 chemotherapy meds      History of stroke    -cont atorvastatin      Seizure disorder    -cont home dose keppra      Anticoagulant long-term use    Patient on apixiban for DVT diagnosed two years ago in her LLE  Consider switching to lovenox in setting of cancer      CKD (chronic kidney disease) stage 3, GFR 30-59 ml/min    Baseline, stable      Essential hypertension    Improving   Amlodipine 5mg daily restarted  -Lopressor 25mg BID added 04/29/2018 for HTN and persistent tachycardia as well         Staff attestation to follow, treatment plan not yet discussed with attending physician.  Dispo: awaiting SNF placement; PT/OT to reeval today 04/30    Maurisio Bergeron MD  Hematology/Oncology, PGY-1  [facility]

## 2018-04-30 NOTE — ASSESSMENT & PLAN NOTE
Stable  -s/p lasix 40mg IV x1 dose yesterday 04/28 w/ 1800 cc UOP and 04/29 w/ 1700 cc UOP  -Will repeat echo today to evaluate for any possible decreased cardiac functioning 2/2 chemotherapy meds

## 2018-04-30 NOTE — PROGRESS NOTES
[facility]  Pulmonology  Progress Note    Patient Name: Naty St  MRN: [id number]  Admission Date: 4/27/2018  Hospital Length of Stay: 3 days  Code Status: Full Code  Attending Provider: Troy East MD  Primary Care Provider: Olvin Mars MD   Principal Problem: Acute on chronic respiratory failure with hypoxia    Subjective:     Interval History:     Remains significant SOB, notes that she is feeling no better with diuresis.     Still denies cough, sputum production. Afebrile overnight and BP stable.     Objective:     Vital Signs (Most Recent):  Temp: 97.8 °F (36.6 °C) (04/30/18 1140)  Pulse: 67 (04/30/18 1140)  Resp: 18 (04/30/18 1140)  BP: 115/62 (04/30/18 1140)  SpO2: 96 % (04/30/18 1140) Vital Signs (24h Range):  Temp:  [97.8 °F (36.6 °C)-98.7 °F (37.1 °C)] 97.8 °F (36.6 °C)  Pulse:  [67-86] 67  Resp:  [16-20] 18  SpO2:  [92 %-100 %] 96 %  BP: (115-157)/(60-76) 115/62     Weight: 60.8 kg (134 lb 0.6 oz)  Body mass index is 22.31 kg/m².      Intake/Output Summary (Last 24 hours) at 04/30/18 1311  Last data filed at 04/30/18 1100   Gross per 24 hour   Intake              720 ml   Output              750 ml   Net              -30 ml       Physical Exam     Gen: alert and oriented. NAD on [place] oxygen.    HEENT: oral mucosa moist, free of lesions, no dental abnormalities, neck free of lymphadenopathy. JVD negative, no eye abnormalities. Pupils appears equal and reactive.   Chest: no chest wall tenderness. CTAB. No wheezing.   Heart: RRR, No M/G/r.   Abdomen: soft, non tender, no masses. No g/r/r  Extremities: no edema. No cyanosis. No deformities.       Vents:       Lines/Drains/Airways     Peripheral Intravenous Line                 Peripheral IV - Single Lumen 04/27/18 0417 Right Wrist 3 days                Significant Labs:    CBC/Anemia Profile:    Recent Labs  Lab 04/29/18  0552 04/30/18  0435   WBC 15.96* 14.87*   HGB 10.9* 10.5*   HCT 36.5* 35.0*    303   MCV 86 87    RDW 18.2* 18.2*        Chemistries:    Recent Labs  Lab 04/29/18  0552 04/30/18  0435    140   K 4.4 4.4    104   CO2 30* 29   BUN 25* 26*   CREATININE 1.7* 1.6*   CALCIUM 9.0 8.6*   ALBUMIN 2.3* 2.2*   PROT 6.3 5.7*   BILITOT 0.4 0.3   ALKPHOS 87 76   ALT 14 12   AST 17 15   MG 2.1 2.3   PHOS 3.3 2.8       All pertinent labs within the past 24 hours have been reviewed.    Significant Imaging:  I have reviewed and interpreted all pertinent imaging results/findings within the past 24 hours.    Assessment/Plan:     * Acute on chronic respiratory failure with hypoxia    CT chest compared from this visit to previous (4/11 to 4/27/18), Pattern of infiltrates is similar but certainly progressed since prior admission.     Possible etiologies for abnormal CT:  A. 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 Pneumonitis is a possibility, However most reported cases improve with stopping drug. Her CT scan has infact progressed despite stopping medication. Regardless - qualifies as Grade 3 lung injury - suggested treatment is atleast 48 hours of methylprednisone. Will recommend 80 mg Q8h for at least 24 hours after which she can be transitioned to oral prednisone.     B. Volume overload - Unlikely, given normal BNP and minimal improvement in CXR after diuresis overnight.     C. Progressive malignancy - it is probable that at least some of her interlobular septal thickening represent lymphangitis spread of cancer given overall picture of progressive cancer.     D. Infection - No clear clinical evidence of infection. Procalcitonin negative. Additionally, patient is not current immunocompromised and has been treated at previous admission for typical bacterial infections.                Shilo Morejon, DO  Pulmonology  [facility]

## 2018-04-30 NOTE — PLAN OF CARE
Problem: Physical Therapy Goal  Goal: Physical Therapy Goal  Goals to be met by: 18     Patient will increase functional independence with mobility by performin. Supine to sit with Set-up [place]  2. Sit to supine with Set-up [place]  3. Sit to stand transfer with Supervision - met   4. Gait  x 150 feet with Supervision using Rolling Walker.      Outcome: Ongoing (interventions implemented as appropriate)  Goals reviewed and remain appropriate. Pt progressing towards goals.    Linda Cantu, PT, DPT   2018  [phone]

## 2018-04-30 NOTE — ASSESSMENT & PLAN NOTE
CT chest compared from this visit to previous (4/11 to 4/27/18), Pattern of infiltrates is similar but certainly progressed since prior admission.     Possible etiologies for abnormal CT:  A. 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 Pneumonitis is a possibility, However most reported cases improve with stopping drug. Her CT scan has infact progressed despite stopping medication. Regardless - qualifies as Grade 3 lung injury - suggested treatment is atleast 48 hours of methylprednisone. Will recommend 80 mg Q8h for at least 24 hours after which she can be transitioned to oral prednisone.     B. Volume overload - Unlikely, given normal BNP and minimal improvement in CXR after diuresis overnight.     C. Progressive malignancy - it is probable that at least some of her interlobular septal thickening represent lymphangitis spread of cancer given overall picture of progressive cancer.     D. Infection - No clear clinical evidence of infection. Procalcitonin negative. Additionally, patient is not current immunocompromised and has been treated at previous admission for typical bacterial infections.

## 2018-04-30 NOTE — ASSESSMENT & PLAN NOTE
Improving   Amlodipine 5mg daily restarted  -Lopressor 25mg BID added 04/29/2018 for HTN and persistent tachycardia as well

## 2018-04-30 NOTE — ASSESSMENT & PLAN NOTE
-Etiology remains unclear; DDx includes worsening lung cancer, PNA, or volume overload  -CXR with worsening bilateral infiltrates, however BNP wnl, will repeat CXR today 04/30  -Lasix 20 mg IV once in Ed and 1 dose 40mg IV  04/28 and 04/29 with appropriate response, will continue to monitor  -Jeni prn  -Pulm has evaluated, appreciate their assistance  -CT chest revealing for worsening disease  -S/p 3 doses cefepime, deescalate vs discontinue antibiotic coverage today 04/30

## 2018-04-30 NOTE — SUBJECTIVE & OBJECTIVE
Interval History:     Remains significant SOB, notes that she is feeling no better with diuresis.     Still denies cough, sputum production. Afebrile overnight and BP stable.     Objective:     Vital Signs (Most Recent):  Temp: 97.8 °F (36.6 °C) (04/30/18 1140)  Pulse: 67 (04/30/18 1140)  Resp: 18 (04/30/18 1140)  BP: 115/62 (04/30/18 1140)  SpO2: 96 % (04/30/18 1140) Vital Signs (24h Range):  Temp:  [97.8 °F (36.6 °C)-98.7 °F (37.1 °C)] 97.8 °F (36.6 °C)  Pulse:  [67-86] 67  Resp:  [16-20] 18  SpO2:  [92 %-100 %] 96 %  BP: (115-157)/(60-76) 115/62     Weight: 60.8 kg (134 lb 0.6 oz)  Body mass index is 22.31 kg/m².      Intake/Output Summary (Last 24 hours) at 04/30/18 1311  Last data filed at 04/30/18 1100   Gross per 24 hour   Intake              720 ml   Output              750 ml   Net              -30 ml       Physical Exam     Gen: alert and oriented. NAD on [place] oxygen.    HEENT: oral mucosa moist, free of lesions, no dental abnormalities, neck free of lymphadenopathy. JVD negative, no eye abnormalities. Pupils appears equal and reactive.   Chest: no chest wall tenderness. CTAB. No wheezing.   Heart: RRR, No M/G/r.   Abdomen: soft, non tender, no masses. No g/r/r  Extremities: no edema. No cyanosis. No deformities.       Vents:       Lines/Drains/Airways     Peripheral Intravenous Line                 Peripheral IV - Single Lumen 04/27/18 0417 Right Wrist 3 days                Significant Labs:    CBC/Anemia Profile:    Recent Labs  Lab 04/29/18  0552 04/30/18  0435   WBC 15.96* 14.87*   HGB 10.9* 10.5*   HCT 36.5* 35.0*    303   MCV 86 87   RDW 18.2* 18.2*        Chemistries:    Recent Labs  Lab 04/29/18  0552 04/30/18  0435    140   K 4.4 4.4    104   CO2 30* 29   BUN 25* 26*   CREATININE 1.7* 1.6*   CALCIUM 9.0 8.6*   ALBUMIN 2.3* 2.2*   PROT 6.3 5.7*   BILITOT 0.4 0.3   ALKPHOS 87 76   ALT 14 12   AST 17 15   MG 2.1 2.3   PHOS 3.3 2.8       All pertinent labs within the past 24 hours  have been reviewed.    Significant Imaging:  I have reviewed and interpreted all pertinent imaging results/findings within the past 24 hours.

## 2018-04-30 NOTE — PROGRESS NOTES
4-30-18-- --Chart reviewed, noted that the patient is currently admitted to the hospital. Unable to complete assessment for OPCM. At the time of discharge, please place for an Ambulatory referral to Outpatient Case Management. Message sent to Val RUELAS for IPCM asking to notify Loli when the patient is discharged and if the patient is going to D/C to SNF or to home. Jhonny LEE CCM

## 2018-04-30 NOTE — PLAN OF CARE
Problem: Patient Care Overview  Goal: Plan of Care Review  Outcome: Ongoing (interventions implemented as appropriate)  * AAO x 4  * Cardic diet patient tolerating well. See chart for % eaten.   * No edema noted.  * Bed at lowest position and locked, call light within reach, non-slip socks on, son at bedside, and side rails up x 2.  Encouraged patient to call for assistance when needed patient and son stated understanding.  * Right wrist PIV 20 G (04/27/18) patent, dressing clean dry and intact no signs or symptoms of infection noted.  * Neuro check every 4 hrs. All neuro checks WNL.   * Oxygen 3.5 liters via nasal cannula.  Oxygen saturation  % respirations even and unlabored.   * Patient has no complaints of pain at this time.  * Skin assessment preformed no breakdown noted.  * Standard precautions maintained.  * Continuous telemetry monitoring continued ST SR 70-90's.  * Patient able to turn self no assistance needed.  * Patient voiding clear yellow urine.  See flow sheet for intake and output totals.

## 2018-04-30 NOTE — PT/OT/SLP PROGRESS
Physical Therapy Treatment    Patient Name:  Naty St   MRN:  [id number]    Recommendations:     Discharge Recommendations:  nursing facility, skilled (may progress to )   Discharge Equipment Recommendations: none   Barriers to discharge: None    Assessment:     Naty St is a 72 y.o. female admitted with a medical diagnosis of Acute on chronic respiratory failure with hypoxia.  She presents with the following impairments/functional limitations:  weakness, impaired functional mobilty, impaired balance, impaired endurance, impaired self care skills, gait instability, impaired cardiopulmonary response to activity. Pt able to perform functional mobility without physical assist, but used RW for balance assist throughout standing tasks. Demo'd impaired endurance and generalized weakness, limiting further ambulation distance and progression of mobility. Pt would continue to benefit from skilled acute PT in order to address current deficits and progress functional mobility.     Rehab Prognosis:  good; patient would benefit from acute skilled PT services to address these deficits and reach maximum level of function.      Recent Surgery: * No surgery found *      Plan:     During this hospitalization, patient to be seen 4 x/week to address the above listed problems via gait training, therapeutic activities, therapeutic exercises, neuromuscular re-education  · Plan of Care Expires:  05/28/18   Plan of Care Reviewed with: patient    Subjective     Communicated with RN prior to session.  Patient found UIC upon PT entry to room, agreeable to treatment.      Chief Complaint: none noted  Patient comments/goals: return home   Pain/Comfort:  · Pain Rating 1: 0/10    Patients cultural, spiritual, Sabianist conflicts given the current situation: none noted     Objective:     Patient found with: oxygen     General Precautions: Standard, fall   Orthopedic Precautions:N/A   Braces: N/A     Functional  Mobility:  · Transfers:     · Sit to Stand:  supervision with rolling walker and x2 reps  · Gait: ~30 ft. + ~30 ft. with RW, initially with CGA but progressed to close SBA  · Portable O2 in place throughout. Required seated rest breaks following ambulation trials with cues for breathing technique. Denied SOB but reported general fatigue following gait training  · Decreased leila, decreased step length, mild gait instability        AM-PAC 6 CLICK MOBILITY  Turning over in bed (including adjusting bedclothes, sheets and blankets)?: 3  Sitting down on and standing up from a chair with arms (e.g., wheelchair, bedside commode, etc.): 3  Moving from lying on back to sitting on the side of the bed?: 3  Moving to and from a bed to a chair (including a wheelchair)?: 3  Need to walk in hospital room?: 3  Climbing 3-5 steps with a railing?: 3  Total Score: 18       Therapeutic Activities and Exercises:  Performed seated therex BLE x10 reps each: AP, LAQ, hip flexion. Pt instructed to continue performing (I) 3x daily. Pt verbalized understanding.   Discussed d/c recs with pt (SNF with possible progress to ) and nature of various d/c dispositions. Pt v/u.   RN notified of PT d/c recs and pt performance during session.     Patient left up in chair with all lines intact and call button in reach..    GOALS:    Physical Therapy Goals        Problem: Physical Therapy Goal    Goal Priority Disciplines Outcome Goal Variances Interventions   Physical Therapy Goal     PT/OT, PT Ongoing (interventions implemented as appropriate)     Description:  Goals to be met by: 18     Patient will increase functional independence with mobility by performin. Supine to sit with Set-up [place]  2. Sit to supine with Set-up [place]  3. Sit to stand transfer with Supervision - met   4. Gait  x 150 feet with Supervision using Rolling Walker.                        Time Tracking:     PT Received On: 18  PT Start Time: 1106      PT Stop Time: 1129  PT Total Time (min): 23 min     Billable Minutes: Gait Training 13 and Therapeutic Exercise 10    Treatment Type: Treatment  PT/PTA: PT     PTA Visit Number: 0     Linda Cantu PT, DPT   4/30/2018  [phone]

## 2018-04-30 NOTE — SUBJECTIVE & OBJECTIVE
Interval History: TAMMY. Will obtain CXR and repeat echo today to evaluate for any possible decreased cardiac functioning 2/2 chemotherapy meds. Otherwise making slight improvement daily, now req. 3.5L NC from 4L the day prior; on 3L at home    Oncology Treatment Plan:   [No treatment plan]    Medications:  Continuous Infusions:  Scheduled Meds:   amLODIPine  5 mg Oral Daily    apixaban  5 mg Oral BID    atorvastatin  40 mg Oral Daily    dronabinol  5 mg Oral BID AC    levETIRAcetam  750 mg Oral BID    lipase-protease-amylase 12,000-38,000-60,000 units  1 capsule Oral TID WM    metoprolol tartrate  25 mg Oral BID    multivitamin  1 tablet Oral Daily    predniSONE  60 mg Oral Daily    sodium bicarbonate  650 mg Oral TID     PRN Meds:acetaminophen, albuterol-ipratropium 2.5mg-0.5mg/3mL, dextrose 50%, dextrose 50%, glucagon (human recombinant), glucose, glucose, LORazepam, ondansetron, senna-docusate 8.6-50 mg, sodium chloride 0.9%     Review of Systems   Constitutional: Positive for fatigue. Negative for chills and fever.   Respiratory: Positive for shortness of breath. Negative for chest tightness.    Cardiovascular: Negative for chest pain.   Gastrointestinal: Negative for abdominal pain, constipation, diarrhea and vomiting.   Genitourinary: Negative for difficulty urinating and dysuria.   Skin: Positive for color change and rash.   Neurological: Positive for light-headedness. Negative for syncope.     Objective:     Vital Signs (Most Recent):  Temp: 98.6 °F (37 °C) (04/30/18 0400)  Pulse: 72 (04/30/18 0400)  Resp: 16 (04/30/18 0400)  BP: (!) 153/76 (04/30/18 0400)  SpO2: 99 % (04/30/18 0400) Vital Signs (24h Range):  Temp:  [97.8 °F (36.6 °C)-98.7 °F (37.1 °C)] 98.6 °F (37 °C)  Pulse:  [70-90] 72  Resp:  [16-20] 16  SpO2:  [96 %-100 %] 99 %  BP: (115-157)/(60-76) 153/76     Weight: 58.9 kg (129 lb 13.6 oz)  Body mass index is 21.61 kg/m².  Body surface area is 1.64 meters squared.      Intake/Output  Summary (Last 24 hours) at 04/30/18 0617  Last data filed at 04/29/18 1724   Gross per 24 hour   Intake              720 ml   Output             1650 ml   Net             -930 ml       Physical Exam   Constitutional: She is oriented to person, place, and time. She appears well-developed and well-nourished. No distress.   HENT:   Head: Normocephalic and atraumatic.   Eyes: Conjunctivae are normal.   Neck: Normal range of motion. Neck supple.   Cardiovascular: Normal rate and regular rhythm.    Pulmonary/Chest: She has decreased breath sounds. She has no wheezes. She has rales.   Coarse breath sounds    Abdominal: Soft. Bowel sounds are normal. She exhibits no distension. There is no tenderness.   Musculoskeletal: She exhibits no tenderness.   Neurological: She is alert and oriented to person, place, and time.   No gross focal deficits   Skin: Skin is warm and dry. She is not diaphoretic. There is erythema.   Vitals reviewed.      Significant Labs:   Recent Results (from the past 24 hour(s))   CBC with Automated Differential    Collection Time: 04/30/18  4:35 AM   Result Value Ref Range    WBC 14.87 (H) 3.90 - 12.70 K/uL    RBC 4.04 4.00 - 5.40 M/uL    Hemoglobin 10.5 (L) 12.0 - 16.0 g/dL    Hematocrit 35.0 (L) 37.0 - 48.5 %    MCV 87 82 - 98 fL    MCH 26.0 (L) 27.0 - 31.0 pg    MCHC 30.0 (L) 32.0 - 36.0 g/dL    RDW 18.2 (H) 11.5 - 14.5 %    Platelets 303 150 - 350 K/uL    MPV 10.1 9.2 - 12.9 fL    Immature Granulocytes 1.1 (H) 0.0 - 0.5 %    Gran # (ANC) 11.2 (H) 1.8 - 7.7 K/uL    Immature Grans (Abs) 0.16 (H) 0.00 - 0.04 K/uL    Lymph # 1.7 1.0 - 4.8 K/uL    Mono # 1.5 (H) 0.3 - 1.0 K/uL    Eos # 0.2 0.0 - 0.5 K/uL    Baso # 0.03 0.00 - 0.20 K/uL    nRBC 0 0 /100 WBC    Gran% 75.3 (H) 38.0 - 73.0 %    Lymph% 11.6 (L) 18.0 - 48.0 %    Mono% 10.3 4.0 - 15.0 %    Eosinophil% 1.5 0.0 - 8.0 %    Basophil% 0.2 0.0 - 1.9 %    Differential Method Automated      Diagnostic Results:  2D echo w/ doppler 04/30/2018:  Will f/u  results when available  CXR 04/30/2018:  Will f/u results when available

## 2018-05-01 ENCOUNTER — TELEPHONE (OUTPATIENT)
Dept: HEMATOLOGY/ONCOLOGY | Facility: CLINIC | Age: 73
End: 2018-05-01

## 2018-05-01 LAB
ALBUMIN SERPL BCP-MCNC: 2.2 G/DL
ALP SERPL-CCNC: 70 U/L
ALT SERPL W/O P-5'-P-CCNC: 16 U/L
ANION GAP SERPL CALC-SCNC: 8 MMOL/L
AST SERPL-CCNC: 16 U/L
BASOPHILS # BLD AUTO: 0.02 K/UL
BASOPHILS NFR BLD: 0.1 %
BILIRUB SERPL-MCNC: 0.3 MG/DL
BUN SERPL-MCNC: 30 MG/DL
CALCIUM SERPL-MCNC: 8.4 MG/DL
CHLORIDE SERPL-SCNC: 105 MMOL/L
CO2 SERPL-SCNC: 27 MMOL/L
CREAT SERPL-MCNC: 1.5 MG/DL
DIFFERENTIAL METHOD: ABNORMAL
EOSINOPHIL # BLD AUTO: 0 K/UL
EOSINOPHIL NFR BLD: 0 %
ERYTHROCYTE [DISTWIDTH] IN BLOOD BY AUTOMATED COUNT: 17.7 %
EST. GFR  (AFRICAN AMERICAN): 39.6 ML/MIN/1.73 M^2
EST. GFR  (NON AFRICAN AMERICAN): 34.3 ML/MIN/1.73 M^2
GLUCOSE SERPL-MCNC: 177 MG/DL
HCT VFR BLD AUTO: 33.3 %
HGB BLD-MCNC: 10.1 G/DL
IMM GRANULOCYTES # BLD AUTO: 0.25 K/UL
IMM GRANULOCYTES NFR BLD AUTO: 1.5 %
LYMPHOCYTES # BLD AUTO: 1.2 K/UL
LYMPHOCYTES NFR BLD: 7.4 %
MAGNESIUM SERPL-MCNC: 2.4 MG/DL
MCH RBC QN AUTO: 25.9 PG
MCHC RBC AUTO-ENTMCNC: 30.3 G/DL
MCV RBC AUTO: 85 FL
MONOCYTES # BLD AUTO: 0.8 K/UL
MONOCYTES NFR BLD: 4.8 %
NEUTROPHILS # BLD AUTO: 13.9 K/UL
NEUTROPHILS NFR BLD: 86.2 %
NRBC BLD-RTO: 0 /100 WBC
PHOSPHATE SERPL-MCNC: 2.1 MG/DL
PLATELET # BLD AUTO: 288 K/UL
PMV BLD AUTO: 10 FL
POTASSIUM SERPL-SCNC: 4.4 MMOL/L
PROT SERPL-MCNC: 5.5 G/DL
RBC # BLD AUTO: 3.9 M/UL
SODIUM SERPL-SCNC: 140 MMOL/L
WBC # BLD AUTO: 16.16 K/UL

## 2018-05-01 PROCEDURE — 84100 ASSAY OF PHOSPHORUS: CPT

## 2018-05-01 PROCEDURE — 83735 ASSAY OF MAGNESIUM: CPT

## 2018-05-01 PROCEDURE — 63600175 PHARM REV CODE 636 W HCPCS: Performed by: STUDENT IN AN ORGANIZED HEALTH CARE EDUCATION/TRAINING PROGRAM

## 2018-05-01 PROCEDURE — 25000003 PHARM REV CODE 250: Performed by: STUDENT IN AN ORGANIZED HEALTH CARE EDUCATION/TRAINING PROGRAM

## 2018-05-01 PROCEDURE — 80053 COMPREHEN METABOLIC PANEL: CPT

## 2018-05-01 PROCEDURE — 25000003 PHARM REV CODE 250: Performed by: INTERNAL MEDICINE

## 2018-05-01 PROCEDURE — 85025 COMPLETE CBC W/AUTO DIFF WBC: CPT

## 2018-05-01 PROCEDURE — 36415 COLL VENOUS BLD VENIPUNCTURE: CPT

## 2018-05-01 PROCEDURE — 63600175 PHARM REV CODE 636 W HCPCS: Performed by: INTERNAL MEDICINE

## 2018-05-01 PROCEDURE — 20600001 HC STEP DOWN PRIVATE ROOM

## 2018-05-01 PROCEDURE — 99231 SBSQ HOSP IP/OBS SF/LOW 25: CPT | Mod: GC,,, | Performed by: INTERNAL MEDICINE

## 2018-05-01 RX ADMIN — METHYLPREDNISOLONE SODIUM SUCCINATE 80 MG: 125 INJECTION, POWDER, FOR SOLUTION INTRAMUSCULAR; INTRAVENOUS at 09:05

## 2018-05-01 RX ADMIN — THERA TABS 1 TABLET: TAB at 09:05

## 2018-05-01 RX ADMIN — ATORVASTATIN CALCIUM 40 MG: 20 TABLET, FILM COATED ORAL at 09:05

## 2018-05-01 RX ADMIN — AMOXICILLIN AND CLAVULANATE POTASSIUM 500 MG: 500; 125 TABLET, FILM COATED ORAL at 08:05

## 2018-05-01 RX ADMIN — PANCRELIPASE 1 CAPSULE: 60000; 12000; 38000 CAPSULE, DELAYED RELEASE PELLETS ORAL at 06:05

## 2018-05-01 RX ADMIN — APIXABAN 5 MG: 5 TABLET, FILM COATED ORAL at 08:05

## 2018-05-01 RX ADMIN — PANCRELIPASE 1 CAPSULE: 60000; 12000; 38000 CAPSULE, DELAYED RELEASE PELLETS ORAL at 09:05

## 2018-05-01 RX ADMIN — SODIUM BICARBONATE 650 MG TABLET 650 MG: at 08:05

## 2018-05-01 RX ADMIN — AMLODIPINE BESYLATE 5 MG: 5 TABLET ORAL at 09:05

## 2018-05-01 RX ADMIN — DRONABINOL 5 MG: 5 CAPSULE ORAL at 03:05

## 2018-05-01 RX ADMIN — SODIUM BICARBONATE 650 MG TABLET 650 MG: at 09:05

## 2018-05-01 RX ADMIN — DRONABINOL 5 MG: 5 CAPSULE ORAL at 05:05

## 2018-05-01 RX ADMIN — APIXABAN 5 MG: 5 TABLET, FILM COATED ORAL at 09:05

## 2018-05-01 RX ADMIN — METOPROLOL SUCCINATE 50 MG: 50 TABLET, EXTENDED RELEASE ORAL at 09:05

## 2018-05-01 RX ADMIN — AMOXICILLIN AND CLAVULANATE POTASSIUM 500 MG: 500; 125 TABLET, FILM COATED ORAL at 09:05

## 2018-05-01 RX ADMIN — LEVETIRACETAM 750 MG: 750 TABLET ORAL at 08:05

## 2018-05-01 RX ADMIN — SODIUM BICARBONATE 650 MG TABLET 650 MG: at 03:05

## 2018-05-01 RX ADMIN — METHYLPREDNISOLONE SODIUM SUCCINATE 80 MG: 125 INJECTION, POWDER, FOR SOLUTION INTRAMUSCULAR; INTRAVENOUS at 03:05

## 2018-05-01 RX ADMIN — LEVETIRACETAM 750 MG: 750 TABLET ORAL at 09:05

## 2018-05-01 RX ADMIN — METHYLPREDNISOLONE SODIUM SUCCINATE 80 MG: 125 INJECTION, POWDER, FOR SOLUTION INTRAMUSCULAR; INTRAVENOUS at 08:05

## 2018-05-01 NOTE — ASSESSMENT & PLAN NOTE
-Etiology remains unclear; DDx includes worsening lung cancer, PNA, volume overload, or medication induced pneumonitis  -Pulmonary consulted and recommends IV solumedrol  -Will reassess oxygen needs today.  -Echo shows some intermediate diastolic dysfunction.  Will discuss with cardiology.

## 2018-05-01 NOTE — PLAN OF CARE
Problem: Patient Care Overview  Goal: Plan of Care Review  Outcome: Ongoing (interventions implemented as appropriate)  * AAO x 4  * Cardic diet patient tolerating well. See chart for % eaten.   * No edema noted.  * Bed at lowest position and locked, call light within reach, non-slip socks on, son at bedside, and side rails up x 2.  Encouraged patient to call for assistance when needed patient and son stated understanding. This RN visualized patient ambulating in room gait and balance unsteady. Patient agreed to allow staff or son ambulate her to the bathroom walker ordered.   * Right wrist PIV 20 G (04/27/18) patent, dressing clean dry and intact no signs or symptoms of infection noted.  * Neuro check every 4 hrs. All neuro checks WNL.   * Oxygen 3.5 liters via nasal cannula.  Oxygen saturation  % respirations even and unlabored.   * Patient has no complaints of pain at this time.  * Skin assessment preformed no breakdown noted.  * Standard precautions maintained.  * Patient able to turn self no assistance needed.  * Patient voiding clear yellow urine.  See flow sheet for intake and output totals.

## 2018-05-01 NOTE — ASSESSMENT & PLAN NOTE
-s/p lasix 40mg IV x1 dose yesterday 04/28 w/ 1800 cc UOP and 04/29 w/ 1700 cc UOP  -Echo continues to show diastolic dysfunction but shows right wall motion abnormalities.  -Will contact cardiology and discuss the patient.  -Will continue to monitor

## 2018-05-01 NOTE — PLAN OF CARE
Problem: Patient Care Overview  Goal: Plan of Care Review  Outcome: Ongoing (interventions implemented as appropriate)  Plan of care reviewed with pt and family at beginning of shift and updated throughout the day.  Pt is awake, alert, oriented, afebrile and free of injury and falls.  Safety and fall precautions maintained.  VSS.  Pt on oxygen 3.5 LPM per nasal cannula maintaining oxygen saturation between  96-99%.  Solu-Medrol 80 mg IV ordered every 8 hrs.  Neuro checks every 4 hrs, no neuro changes today.  Pt sat up in chair for majority of the day.   at bedside.  Plan is for pt to go to go to SNF.   Bed locked and in lowest position, side rails up x 2, non-skid footwear in place and call light and personal belongings within reach.  Pt and family instructed to call for assistance when needed and they verbalized understanding.  Will monitor.

## 2018-05-01 NOTE — ASSESSMENT & PLAN NOTE
Improving   Patient has had an acute on chronic worsening of weakness and debility in the last week.   -CT head unrevealing for acute process  -Possibly related to UTI  -PT/OT following

## 2018-05-01 NOTE — PROGRESS NOTES
[facility]  Hematology/Oncology  Progress Note    Patient Name: Naty St  Admission Date: 4/27/2018  Hospital Length of Stay: 4 days  Code Status: Full Code     Subjective:     HPI:  The patient is a 72 y.o. female with lung cancer, HTN, breast cancer,HFpEF, and stroke in October 2017 who presents to the ED with a complaint of fatigue and worsening DELA CRUZ.  Reports that she has not been able to ambulate secondary to fatigue for the past week.   patient states she is able to stand but is too weak/fatigued to actually walk. Denies focal deficits. Son notes appetite has been decreased, but pt ate well tonight.  Pt diagnosed PNA 4/9 and was discharged on 4/16 on 3L oxygen.  She is on blood thinners due to blood clot two years ago in her LLE.  Son also notes slower cognitive function and states that she asked what her 's name was earlier today, which is not normal for her. Her DELA CRUZ has also been worsening for a week. She becomes very dyspneic with minimal movement in bed. She denies fevers and chills at home, dysuria, diarrhea.    Oncologic History:  Ms. Naty St was admitted to the hospital between 01/25/2016 and 01/28/2016. She has a history of pancreatic cancer 17 years ago, breast cancer and meningioma, presented to the hospital complaining of loss of consciousness. The patient apparently was in her normal state of health and she stood up to go to the bathroom and fell to the floor. The patient's daughter helped the mother up in the bathroom and noted that her mother's upper extremities were shaking and eye rolling. No reports of bowel or bladder incontinence, tongue biting or rolling. The second episode lasted about four minutes and she was extremely lethargic following that. Apparently, the patient had a meningioma resection done in the past; however, recently, underwent neurosurgical resection with Dr. 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    Additional immunohistochemical stains were performed which revealed the tumor cells to be are positive for TTF1 and negative for ER and GCDFP. The morphology and TTF1 positivity are most consistent with lung primary.    Of note, imaging scan at the end of January 2016 revealed a mass in the lung at 2 cm in the medial aspect of the apical segment of the right upper lobe abutting the mediastinum at the level of the azygous vein and abutting and possibly encasing the segmental bronchi and vessels of the apical segment of the right upper lobe and no pleural fluid was present. Also, there is an enlarged right paratracheal lymph node. No evidence of any metastatic disease at the pancreatic site with postoperative changes post Whipple disease  Her PET Scan from 2/15/16 reveal "Hypermetabolic mass in the right lung apex consistent with a primary malignancy. Hypermetabolic mediastinal lymph nodes consistent with metastatic disease. Right sacral hypermetabolic lesion consistent with metastatic disease, noting additional mildly sclerotic lesions in multiple vertebral bodies which do not demonstrate abnormal hypermetabolism  She underwent IR bone biopsy which revealed metastatic adenocarcinoma of lung origin. 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  She has completed focal RT to brain lesions in March 2016.    PET scan from 6/6/16 shows "Dramatic almost complete response to therapy."  Lovenox started after US lower ext 6/7/16 shows Acute complete occlusion of one of the left posterior tibial vein.Remote partial thrombus of the proximal left superficial femoral vein.  She is on Tarceva.   12/6/16 PET scan reveals "Stable right upper lobe lesion and right hilar lymph node. No new lesions identified. Trace left pleural effusion".  1/27/17 Renal u/s "Medical renal disease. Nonobstructive right nephrolithiasis"  1/31/17 PET - "Right " "upper lobe nodule and right hilar lymph node similar and very low grade activity.  There is no definite evidence of recurrence."   11/9/17 PET "In this patient with history of lung cancer, there is interval increase in size and hypermetabolism of a right upper lobe lung lesion concerning for recurrent disease. Additionally, there is interval appearance of a hypermetabolic paratracheal lymph node suspicious for metastatic disease"         She progressed on Tarceva She underwent EBUS on 12/5/17. Pathology revealed adenocarcinoma but T790m could not be done as quantity was not insufficient. Her PET scan from 1/30/18 revealed The patient's previously identified abnormal lesions is slightly greater uptake of FDG on today's study compared with prior exam. These findings are concerning for progression of disease"    Interval History: The patient states her breathing has improved and she feels better on the IV steroids. Pt with no acute events overnight.  Pt denies fever, chills, CP, cough, N/V, constipation, diarrhea.      Oncology Treatment Plan:   [No treatment plan]    Medications:  Continuous Infusions:  Scheduled Meds:   amLODIPine  5 mg Oral Daily    amoxicillin-clavulanate 500-125mg  1 tablet Oral BID    apixaban  5 mg Oral BID    atorvastatin  40 mg Oral Daily    dronabinol  5 mg Oral BID AC    levETIRAcetam  750 mg Oral BID    lipase-protease-amylase 12,000-38,000-60,000 units  1 capsule Oral TID WM    methylPREDNISolone sodium succinate  80 mg Intravenous TID    metoprolol succinate  50 mg Oral Daily    multivitamin  1 tablet Oral Daily    sodium bicarbonate  650 mg Oral TID     PRN Meds:acetaminophen, albuterol-ipratropium 2.5mg-0.5mg/3mL, dextrose 50%, dextrose 50%, glucagon (human recombinant), glucose, glucose, LORazepam, ondansetron, senna-docusate 8.6-50 mg, sodium chloride 0.9%     Review of Systems   Constitutional: Negative for chills and fever.   Respiratory: Positive for shortness of breath " (improving). Negative for cough.    Cardiovascular: Negative for chest pain and palpitations.   Gastrointestinal: Negative for abdominal pain, constipation, diarrhea, nausea and vomiting.   Skin: Negative for rash.   Neurological: Negative for headaches.     Objective:     Vital Signs (Most Recent):  Temp: 98.8 °F (37.1 °C) (05/01/18 0742)  Pulse: 67 (05/01/18 0742)  Resp: 16 (05/01/18 0742)  BP: (!) 157/70 (05/01/18 0742)  SpO2: 96 % (05/01/18 0742) Vital Signs (24h Range):  Temp:  [97.8 °F (36.6 °C)-98.9 °F (37.2 °C)] 98.8 °F (37.1 °C)  Pulse:  [62-95] 67  Resp:  [16-18] 16  SpO2:  [93 %-100 %] 96 %  BP: (115-157)/(56-77) 157/70     Weight: 60.8 kg (134 lb 0.6 oz)  Body mass index is 22.31 kg/m².  Body surface area is 1.67 meters squared.      Intake/Output Summary (Last 24 hours) at 05/01/18 1120  Last data filed at 05/01/18 1000   Gross per 24 hour   Intake             1620 ml   Output              700 ml   Net              920 ml       Physical Exam   Constitutional: She is oriented to person, place, and time. She appears well-developed and well-nourished.   Eyes: Right eye exhibits no discharge. Left eye exhibits no discharge.   Cardiovascular: Normal rate, regular rhythm and normal heart sounds.  Exam reveals no gallop and no friction rub.    No murmur heard.  Pulmonary/Chest: Effort normal and breath sounds normal. No respiratory distress. She has no wheezes. She has no rales.   Abdominal: Soft. Bowel sounds are normal. She exhibits no distension. There is no tenderness. There is no rebound and no guarding.   Neurological: She is alert and oriented to person, place, and time.       Significant Labs:   Recent Results (from the past 24 hour(s))   Comprehensive Metabolic Panel (CMP)    Collection Time: 05/01/18  4:59 AM   Result Value Ref Range    Sodium 140 136 - 145 mmol/L    Potassium 4.4 3.5 - 5.1 mmol/L    Chloride 105 95 - 110 mmol/L    CO2 27 23 - 29 mmol/L    Glucose 177 (H) 70 - 110 mg/dL    BUN, Bld  30 (H) 8 - 23 mg/dL    Creatinine 1.5 (H) 0.5 - 1.4 mg/dL    Calcium 8.4 (L) 8.7 - 10.5 mg/dL    Total Protein 5.5 (L) 6.0 - 8.4 g/dL    Albumin 2.2 (L) 3.5 - 5.2 g/dL    Total Bilirubin 0.3 0.1 - 1.0 mg/dL    Alkaline Phosphatase 70 55 - 135 U/L    AST 16 10 - 40 U/L    ALT 16 10 - 44 U/L    Anion Gap 8 8 - 16 mmol/L    eGFR if African American 39.6 (A) >60 mL/min/1.73 m^2    eGFR if non  34.3 (A) >60 mL/min/1.73 m^2   Magnesium    Collection Time: 05/01/18  4:59 AM   Result Value Ref Range    Magnesium 2.4 1.6 - 2.6 mg/dL   Phosphorus    Collection Time: 05/01/18  4:59 AM   Result Value Ref Range    Phosphorus 2.1 (L) 2.7 - 4.5 mg/dL   CBC with Automated Differential    Collection Time: 05/01/18  4:59 AM   Result Value Ref Range    WBC 16.16 (H) 3.90 - 12.70 K/uL    RBC 3.90 (L) 4.00 - 5.40 M/uL    Hemoglobin 10.1 (L) 12.0 - 16.0 g/dL    Hematocrit 33.3 (L) 37.0 - 48.5 %    MCV 85 82 - 98 fL    MCH 25.9 (L) 27.0 - 31.0 pg    MCHC 30.3 (L) 32.0 - 36.0 g/dL    RDW 17.7 (H) 11.5 - 14.5 %    Platelets 288 150 - 350 K/uL    MPV 10.0 9.2 - 12.9 fL    Immature Granulocytes 1.5 (H) 0.0 - 0.5 %    Gran # (ANC) 13.9 (H) 1.8 - 7.7 K/uL    Immature Grans (Abs) 0.25 (H) 0.00 - 0.04 K/uL    Lymph # 1.2 1.0 - 4.8 K/uL    Mono # 0.8 0.3 - 1.0 K/uL    Eos # 0.0 0.0 - 0.5 K/uL    Baso # 0.02 0.00 - 0.20 K/uL    nRBC 0 0 /100 WBC    Gran% 86.2 (H) 38.0 - 73.0 %    Lymph% 7.4 (L) 18.0 - 48.0 %    Mono% 4.8 4.0 - 15.0 %    Eosinophil% 0.0 0.0 - 8.0 %    Basophil% 0.1 0.0 - 1.9 %    Differential Method Automated          Diagnostic Results:  I have reviewed all pertinent imaging results/findings within the past 24 hours.    Assessment/Plan:     * Acute on chronic respiratory failure with hypoxia    -Etiology remains unclear; DDx includes worsening lung cancer, PNA, volume overload, or medication induced pneumonitis  -Pulmonary consulted and recommends IV solumedrol  -Will reassess oxygen needs today.  -Echo shows some  intermediate diastolic dysfunction.  Cardiology recommends outpatient follow up per conversation with fellow.        Generalized weakness    Improving   Patient has had an acute on chronic worsening of weakness and debility in the last week.   -CT head unrevealing for acute process  -Possibly related to UTI  -PT/OT following        Metabolic encephalopathy    Improving  Son reports some difficulty with word finding after stroke but has had much more difficulty in past week  -CT head to r/o stroke, no bleed identified          Debility    PT/OT recommend SNF; however, feel the patient may continue to improve acutely.        UTI (urinary tract infection)    -Urine cx revealing for enterococcus faecalis, though may be asymptomatic bacteriuria as patient is without urinary complaint  -Pt on day 4 of antibiotics.  -Will continue Augmentin for now.          Diastolic dysfunction    -s/p lasix 40mg IV x1 dose yesterday 04/28 w/ 1800 cc UOP and 04/29 w/ 1700 cc UOP  -Echo continues to show diastolic dysfunction but shows right wall motion abnormalities.  -I spoke with the cardiology fellow who recommends outpatient f/u with cards.  -Will continue to monitor        History of stroke    - cont atorvastatin        Seizure disorder    - cont home dose keppra        Anticoagulant long-term use    Patient on apixiban for DVT diagnosed two years ago in her LLE  Will continue apixaban.        CKD (chronic kidney disease) stage 3, GFR 30-59 ml/min    Baseline, stable  -CR 1.5 today        Essential hypertension    Improving   Amlodipine 5mg daily restarted  -Lopressor 25mg BID added 04/29/2018 for HTN and persistent tachycardia as well             -Dispo - pending improvement in respiratory status.    Geovani Gonzalez MD  Hematology/Oncology  [facility]

## 2018-05-01 NOTE — ASSESSMENT & PLAN NOTE
-Urine cx revealing for enterococcus faecalis, though may be asymptomatic bacteriuria as patient is without urinary complaint  -Pt on day 4 of antibiotics.  -Will continue Augmentin for now.

## 2018-05-01 NOTE — TELEPHONE ENCOUNTER
----- Message from Willie Ramirez sent at 5/1/2018  1:56 PM CDT -----  Contact: Son   Returning Zandra's call from earlier     Contact::[phone] ext:[phone]

## 2018-05-01 NOTE — TELEPHONE ENCOUNTER
----- Message from Bk Brown sent at 5/1/2018 12:59 PM CDT -----  Contact: Pt son Basil   Nurse De Paz:    Pt son would like to discuss some concerns.     Pt eddie Gracia can be reached at [phone]. Ext.[phone].

## 2018-05-01 NOTE — HOSPITAL COURSE
Naty St  has lung cancer, HTN, breast cancer, HFpEF, and stroke in October 2017 who presents to the ED with a complaint of fatigue and worsening DELA CRUZ. Pt diagnosed PNA 4/9 and was discharged on 4/16 on 3L oxygen. She was initially placed on cefepime for 3 days followed by an add'l 2 days of Augmentin. Pulm was consulted with assistance as her oxygen requirements were increasing. She was placed on oral steroids, then trailed on 80mg TID solumedrol for 2 days and will be discharged on a prednisone taper. She was also diuresed with 2 days of 40mg IV lasix with appropriate UOP resulting, improvement on repeat CXR. She was medically stable and appropriate for DC home with home health on 1L continuous O2. She will be seen for close f/u and may be able to come off oxygen at that time.

## 2018-05-01 NOTE — PT/OT/SLP PROGRESS
"Occupational Therapy      Patient Name:  Naty St   MRN:  [id number]    Patient not seen today secondary to Patient unwilling to participate (OT attempted twice this date. 1st attempt in AM pt refused stating " Not today". OT provided max encouragement for participation. OT returned later in morning and pt found sitting UIC. Pt refused stating she needed to get in contact with her . OT provided education in regards to the importance of participating in therapy session and potential to decline in functional independence. Pt verbalized understading however continuously refused.). Will follow-up next scheduled OT session.    Omaira Dupree OT  5/1/2018  "

## 2018-05-01 NOTE — SUBJECTIVE & OBJECTIVE
Interval History: The patient states her breathing has improved and she feels better on the IV steroids. Pt with no acute events overnight.  Pt denies fever, chills, CP, cough, N/V, constipation, diarrhea.      Oncology Treatment Plan:   [No treatment plan]    Medications:  Continuous Infusions:  Scheduled Meds:   amLODIPine  5 mg Oral Daily    amoxicillin-clavulanate 500-125mg  1 tablet Oral BID    apixaban  5 mg Oral BID    atorvastatin  40 mg Oral Daily    dronabinol  5 mg Oral BID AC    levETIRAcetam  750 mg Oral BID    lipase-protease-amylase 12,000-38,000-60,000 units  1 capsule Oral TID WM    methylPREDNISolone sodium succinate  80 mg Intravenous TID    metoprolol succinate  50 mg Oral Daily    multivitamin  1 tablet Oral Daily    sodium bicarbonate  650 mg Oral TID     PRN Meds:acetaminophen, albuterol-ipratropium 2.5mg-0.5mg/3mL, dextrose 50%, dextrose 50%, glucagon (human recombinant), glucose, glucose, LORazepam, ondansetron, senna-docusate 8.6-50 mg, sodium chloride 0.9%     Review of Systems   Constitutional: Negative for chills and fever.   Respiratory: Positive for shortness of breath (improving). Negative for cough.    Cardiovascular: Negative for chest pain and palpitations.   Gastrointestinal: Negative for abdominal pain, constipation, diarrhea, nausea and vomiting.   Skin: Negative for rash.   Neurological: Negative for headaches.     Objective:     Vital Signs (Most Recent):  Temp: 98.8 °F (37.1 °C) (05/01/18 0742)  Pulse: 67 (05/01/18 0742)  Resp: 16 (05/01/18 0742)  BP: (!) 157/70 (05/01/18 0742)  SpO2: 96 % (05/01/18 0742) Vital Signs (24h Range):  Temp:  [97.8 °F (36.6 °C)-98.9 °F (37.2 °C)] 98.8 °F (37.1 °C)  Pulse:  [62-95] 67  Resp:  [16-18] 16  SpO2:  [93 %-100 %] 96 %  BP: (115-157)/(56-77) 157/70     Weight: 60.8 kg (134 lb 0.6 oz)  Body mass index is 22.31 kg/m².  Body surface area is 1.67 meters squared.      Intake/Output Summary (Last 24 hours) at 05/01/18 1120  Last data  filed at 05/01/18 1000   Gross per 24 hour   Intake             1620 ml   Output              700 ml   Net              920 ml       Physical Exam   Constitutional: She is oriented to person, place, and time. She appears well-developed and well-nourished.   Eyes: Right eye exhibits no discharge. Left eye exhibits no discharge.   Cardiovascular: Normal rate, regular rhythm and normal heart sounds.  Exam reveals no gallop and no friction rub.    No murmur heard.  Pulmonary/Chest: Effort normal and breath sounds normal. No respiratory distress. She has no wheezes. She has no rales.   Abdominal: Soft. Bowel sounds are normal. She exhibits no distension. There is no tenderness. There is no rebound and no guarding.   Neurological: She is alert and oriented to person, place, and time.       Significant Labs:   Recent Results (from the past 24 hour(s))   Comprehensive Metabolic Panel (CMP)    Collection Time: 05/01/18  4:59 AM   Result Value Ref Range    Sodium 140 136 - 145 mmol/L    Potassium 4.4 3.5 - 5.1 mmol/L    Chloride 105 95 - 110 mmol/L    CO2 27 23 - 29 mmol/L    Glucose 177 (H) 70 - 110 mg/dL    BUN, Bld 30 (H) 8 - 23 mg/dL    Creatinine 1.5 (H) 0.5 - 1.4 mg/dL    Calcium 8.4 (L) 8.7 - 10.5 mg/dL    Total Protein 5.5 (L) 6.0 - 8.4 g/dL    Albumin 2.2 (L) 3.5 - 5.2 g/dL    Total Bilirubin 0.3 0.1 - 1.0 mg/dL    Alkaline Phosphatase 70 55 - 135 U/L    AST 16 10 - 40 U/L    ALT 16 10 - 44 U/L    Anion Gap 8 8 - 16 mmol/L    eGFR if African American 39.6 (A) >60 mL/min/1.73 m^2    eGFR if non  34.3 (A) >60 mL/min/1.73 m^2   Magnesium    Collection Time: 05/01/18  4:59 AM   Result Value Ref Range    Magnesium 2.4 1.6 - 2.6 mg/dL   Phosphorus    Collection Time: 05/01/18  4:59 AM   Result Value Ref Range    Phosphorus 2.1 (L) 2.7 - 4.5 mg/dL   CBC with Automated Differential    Collection Time: 05/01/18  4:59 AM   Result Value Ref Range    WBC 16.16 (H) 3.90 - 12.70 K/uL    RBC 3.90 (L) 4.00 - 5.40  M/uL    Hemoglobin 10.1 (L) 12.0 - 16.0 g/dL    Hematocrit 33.3 (L) 37.0 - 48.5 %    MCV 85 82 - 98 fL    MCH 25.9 (L) 27.0 - 31.0 pg    MCHC 30.3 (L) 32.0 - 36.0 g/dL    RDW 17.7 (H) 11.5 - 14.5 %    Platelets 288 150 - 350 K/uL    MPV 10.0 9.2 - 12.9 fL    Immature Granulocytes 1.5 (H) 0.0 - 0.5 %    Gran # (ANC) 13.9 (H) 1.8 - 7.7 K/uL    Immature Grans (Abs) 0.25 (H) 0.00 - 0.04 K/uL    Lymph # 1.2 1.0 - 4.8 K/uL    Mono # 0.8 0.3 - 1.0 K/uL    Eos # 0.0 0.0 - 0.5 K/uL    Baso # 0.02 0.00 - 0.20 K/uL    nRBC 0 0 /100 WBC    Gran% 86.2 (H) 38.0 - 73.0 %    Lymph% 7.4 (L) 18.0 - 48.0 %    Mono% 4.8 4.0 - 15.0 %    Eosinophil% 0.0 0.0 - 8.0 %    Basophil% 0.1 0.0 - 1.9 %    Differential Method Automated          Diagnostic Results:  I have reviewed all pertinent imaging results/findings within the past 24 hours.

## 2018-05-01 NOTE — TELEPHONE ENCOUNTER
"Spoke with son.   He is calling to ask when patient will be able to resume tagrisso, as "the doctors in the hospital are talking in circles. We don't know anything."  Nurse reviewed hospital notes--  Son states, "will her chemo be switched? Obviously it is not working."    Nurse informed son she would contact her back after speaking with dr sullivan.  Son voiced understanding.      Message routed to dr sullivan   "

## 2018-05-02 VITALS
BODY MASS INDEX: 22.7 KG/M2 | WEIGHT: 136.25 LBS | TEMPERATURE: 98 F | RESPIRATION RATE: 18 BRPM | HEIGHT: 65 IN | DIASTOLIC BLOOD PRESSURE: 66 MMHG | HEART RATE: 77 BPM | SYSTOLIC BLOOD PRESSURE: 140 MMHG | OXYGEN SATURATION: 95 %

## 2018-05-02 LAB
ALBUMIN SERPL BCP-MCNC: 2.3 G/DL
ALP SERPL-CCNC: 71 U/L
ALT SERPL W/O P-5'-P-CCNC: 19 U/L
ANION GAP SERPL CALC-SCNC: 7 MMOL/L
AST SERPL-CCNC: 18 U/L
BASOPHILS # BLD AUTO: 0.02 K/UL
BASOPHILS NFR BLD: 0.1 %
BILIRUB SERPL-MCNC: 0.3 MG/DL
BUN SERPL-MCNC: 30 MG/DL
CALCIUM SERPL-MCNC: 8.4 MG/DL
CHLORIDE SERPL-SCNC: 106 MMOL/L
CO2 SERPL-SCNC: 27 MMOL/L
CREAT SERPL-MCNC: 1.6 MG/DL
DIFFERENTIAL METHOD: ABNORMAL
EOSINOPHIL # BLD AUTO: 0 K/UL
EOSINOPHIL NFR BLD: 0 %
ERYTHROCYTE [DISTWIDTH] IN BLOOD BY AUTOMATED COUNT: 17.3 %
EST. GFR  (AFRICAN AMERICAN): 36.6 ML/MIN/1.73 M^2
EST. GFR  (NON AFRICAN AMERICAN): 31.7 ML/MIN/1.73 M^2
GLUCOSE SERPL-MCNC: 147 MG/DL
HCT VFR BLD AUTO: 34.7 %
HGB BLD-MCNC: 10.5 G/DL
IMM GRANULOCYTES # BLD AUTO: 0.36 K/UL
IMM GRANULOCYTES NFR BLD AUTO: 2 %
LYMPHOCYTES # BLD AUTO: 1.1 K/UL
LYMPHOCYTES NFR BLD: 6.2 %
MAGNESIUM SERPL-MCNC: 2.4 MG/DL
MCH RBC QN AUTO: 25.9 PG
MCHC RBC AUTO-ENTMCNC: 30.3 G/DL
MCV RBC AUTO: 86 FL
MONOCYTES # BLD AUTO: 0.5 K/UL
MONOCYTES NFR BLD: 2.5 %
NEUTROPHILS # BLD AUTO: 15.9 K/UL
NEUTROPHILS NFR BLD: 89.2 %
NRBC BLD-RTO: 0 /100 WBC
PHOSPHATE SERPL-MCNC: 2.5 MG/DL
PLATELET # BLD AUTO: 294 K/UL
PMV BLD AUTO: 9.8 FL
POTASSIUM SERPL-SCNC: 4.4 MMOL/L
PROT SERPL-MCNC: 5.7 G/DL
RBC # BLD AUTO: 4.05 M/UL
SODIUM SERPL-SCNC: 140 MMOL/L
WBC # BLD AUTO: 17.8 K/UL

## 2018-05-02 PROCEDURE — 97161 PT EVAL LOW COMPLEX 20 MIN: CPT

## 2018-05-02 PROCEDURE — 25000003 PHARM REV CODE 250: Performed by: INTERNAL MEDICINE

## 2018-05-02 PROCEDURE — 63600175 PHARM REV CODE 636 W HCPCS: Performed by: INTERNAL MEDICINE

## 2018-05-02 PROCEDURE — G8987 SELF CARE CURRENT STATUS: HCPCS | Mod: CK

## 2018-05-02 PROCEDURE — 99232 SBSQ HOSP IP/OBS MODERATE 35: CPT | Mod: GC,,, | Performed by: INTERNAL MEDICINE

## 2018-05-02 PROCEDURE — G8979 MOBILITY GOAL STATUS: HCPCS | Mod: CI

## 2018-05-02 PROCEDURE — 80053 COMPREHEN METABOLIC PANEL: CPT

## 2018-05-02 PROCEDURE — G8989 SELF CARE D/C STATUS: HCPCS | Mod: CK

## 2018-05-02 PROCEDURE — G8978 MOBILITY CURRENT STATUS: HCPCS | Mod: CJ

## 2018-05-02 PROCEDURE — G8988 SELF CARE GOAL STATUS: HCPCS | Mod: CK

## 2018-05-02 PROCEDURE — 83735 ASSAY OF MAGNESIUM: CPT

## 2018-05-02 PROCEDURE — 36415 COLL VENOUS BLD VENIPUNCTURE: CPT

## 2018-05-02 PROCEDURE — 63600175 PHARM REV CODE 636 W HCPCS: Performed by: STUDENT IN AN ORGANIZED HEALTH CARE EDUCATION/TRAINING PROGRAM

## 2018-05-02 PROCEDURE — 97116 GAIT TRAINING THERAPY: CPT

## 2018-05-02 PROCEDURE — 97110 THERAPEUTIC EXERCISES: CPT

## 2018-05-02 PROCEDURE — 85025 COMPLETE CBC W/AUTO DIFF WBC: CPT

## 2018-05-02 PROCEDURE — 99239 HOSP IP/OBS DSCHRG MGMT >30: CPT | Mod: GC,,, | Performed by: INTERNAL MEDICINE

## 2018-05-02 PROCEDURE — 84100 ASSAY OF PHOSPHORUS: CPT

## 2018-05-02 PROCEDURE — 25000003 PHARM REV CODE 250: Performed by: STUDENT IN AN ORGANIZED HEALTH CARE EDUCATION/TRAINING PROGRAM

## 2018-05-02 PROCEDURE — G8980 MOBILITY D/C STATUS: HCPCS | Mod: CJ

## 2018-05-02 RX ORDER — METHYLPREDNISOLONE SOD SUCC 125 MG
80 VIAL (EA) INJECTION 3 TIMES DAILY
Status: COMPLETED | OUTPATIENT
Start: 2018-05-02 | End: 2018-05-02

## 2018-05-02 RX ORDER — PREDNISONE 10 MG/1
40 TABLET ORAL DAILY
Qty: 51 TABLET | Refills: 0 | Status: SHIPPED | OUTPATIENT
Start: 2018-05-02 | End: 2018-09-21

## 2018-05-02 RX ORDER — PREDNISONE 20 MG/1
60 TABLET ORAL DAILY
Qty: 15 TABLET | Refills: 0 | Status: CANCELLED | OUTPATIENT
Start: 2018-05-02 | End: 2018-05-07

## 2018-05-02 RX ADMIN — DRONABINOL 5 MG: 5 CAPSULE ORAL at 09:05

## 2018-05-02 RX ADMIN — THERA TABS 1 TABLET: TAB at 09:05

## 2018-05-02 RX ADMIN — SODIUM BICARBONATE 650 MG TABLET 650 MG: at 03:05

## 2018-05-02 RX ADMIN — AMLODIPINE BESYLATE 5 MG: 5 TABLET ORAL at 09:05

## 2018-05-02 RX ADMIN — METHYLPREDNISOLONE SODIUM SUCCINATE 80 MG: 125 INJECTION, POWDER, FOR SOLUTION INTRAMUSCULAR; INTRAVENOUS at 03:05

## 2018-05-02 RX ADMIN — SODIUM BICARBONATE 650 MG TABLET 650 MG: at 09:05

## 2018-05-02 RX ADMIN — PANCRELIPASE 1 CAPSULE: 60000; 12000; 38000 CAPSULE, DELAYED RELEASE PELLETS ORAL at 09:05

## 2018-05-02 RX ADMIN — ATORVASTATIN CALCIUM 40 MG: 20 TABLET, FILM COATED ORAL at 09:05

## 2018-05-02 RX ADMIN — METOPROLOL SUCCINATE 50 MG: 50 TABLET, EXTENDED RELEASE ORAL at 09:05

## 2018-05-02 RX ADMIN — METHYLPREDNISOLONE SODIUM SUCCINATE 80 MG: 125 INJECTION, POWDER, FOR SOLUTION INTRAMUSCULAR; INTRAVENOUS at 09:05

## 2018-05-02 RX ADMIN — APIXABAN 5 MG: 5 TABLET, FILM COATED ORAL at 09:05

## 2018-05-02 RX ADMIN — LEVETIRACETAM 750 MG: 750 TABLET ORAL at 09:05

## 2018-05-02 RX ADMIN — PANCRELIPASE 1 CAPSULE: 60000; 12000; 38000 CAPSULE, DELAYED RELEASE PELLETS ORAL at 12:05

## 2018-05-02 RX ADMIN — AMOXICILLIN AND CLAVULANATE POTASSIUM 500 MG: 500; 125 TABLET, FILM COATED ORAL at 09:05

## 2018-05-02 NOTE — PROGRESS NOTES
Patient at rest 95% on room air   At rest 97% on 1LNC   Patient walking 86% on room air    walking 91% on 1LNC

## 2018-05-02 NOTE — DISCHARGE SUMMARY
[facility]  Hematology/Oncology  Discharge Summary      Patient Name: Naty St  MRN: [id number]  Admission Date: 4/27/2018  Hospital Length of Stay: 5 days  Discharge Date and Time:  05/02/2018 2:25 PM  Attending Physician: Troy East MD   Discharging Provider: Maurisio Bergeron MD  Primary Care Provider: Olvin Mars MD    HPI: The patient is a 72 y.o. female with lung cancer, HTN, breast cancer,HFpEF, and stroke in October 2017 who presents to the ED with a complaint of fatigue and worsening DELA CRUZ.  Reports that she has not been able to ambulate secondary to fatigue for the past week.   patient states she is able to stand but is too weak/fatigued to actually walk. Denies focal deficits. Son notes appetite has been decreased, but pt ate well tonight.  Pt diagnosed PNA 4/9 and was discharged on 4/16 on 3L oxygen.  She is on blood thinners due to blood clot two years ago in her LLE.  Son also notes slower cognitive function and states that she asked what her 's name was earlier today, which is not normal for her. Her DELA CRUZ has also been worsening for a week. She becomes very dyspneic with minimal movement in bed. She denies fevers and chills at home, dysuria, diarrhea.    Oncologic History:  Ms. Naty St was admitted to the hospital between 01/25/2016 and 01/28/2016. She has a history of pancreatic cancer 17 years ago, breast cancer and meningioma, presented to the hospital complaining of loss of consciousness. The patient apparently was in her normal state of health and she stood up to go to the bathroom and fell to the floor. The patient's daughter helped the mother up in the bathroom and noted that her mother's upper extremities were shaking and eye rolling. No reports of bowel or bladder incontinence, tongue biting or rolling. The second episode lasted about four minutes and she was extremely lethargic following that. Apparently, the patient had a meningioma  "resection done in the past; however, recently, underwent neurosurgical resection with Dr. 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    Additional immunohistochemical stains were performed which revealed the tumor cells to be are positive for TTF1 and negative for ER and GCDFP. The morphology and TTF1 positivity are most consistent with lung primary.    Of note, imaging scan at the end of January 2016 revealed a mass in the lung at 2 cm in the medial aspect of the apical segment of the right upper lobe abutting the mediastinum at the level of the azygous vein and abutting and possibly encasing the segmental bronchi and vessels of the apical segment of the right upper lobe and no pleural fluid was present. Also, there is an enlarged right paratracheal lymph node. No evidence of any metastatic disease at the pancreatic site with postoperative changes post Whipple disease  Her PET Scan from 2/15/16 reveal "Hypermetabolic mass in the right lung apex consistent with a primary malignancy. Hypermetabolic mediastinal lymph nodes consistent with metastatic disease. Right sacral hypermetabolic lesion consistent with metastatic disease, noting additional mildly sclerotic lesions in multiple vertebral bodies which do not demonstrate abnormal hypermetabolism  She underwent IR bone biopsy which revealed metastatic adenocarcinoma of lung origin. 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  She has completed focal RT to brain lesions in March 2016.    PET scan from 6/6/16 shows "Dramatic almost complete response to therapy."  Lovenox started after US lower ext 6/7/16 shows Acute complete occlusion of one of the left posterior tibial vein.Remote partial thrombus of the proximal left superficial femoral vein.  She is on Tarceva.   12/6/16 PET scan reveals "Stable right upper lobe lesion and right hilar lymph node. No new " "lesions identified. Trace left pleural effusion".  1/27/17 Renal u/s "Medical renal disease. Nonobstructive right nephrolithiasis"  1/31/17 PET - "Right upper lobe nodule and right hilar lymph node similar and very low grade activity.  There is no definite evidence of recurrence."   11/9/17 PET "In this patient with history of lung cancer, there is interval increase in size and hypermetabolism of a right upper lobe lung lesion concerning for recurrent disease. Additionally, there is interval appearance of a hypermetabolic paratracheal lymph node suspicious for metastatic disease"      She progressed on Tarceva She underwent EBUS on 12/5/17. Pathology revealed adenocarcinoma but T790m could not be done as quantity was not insufficient. Her PET scan from 1/30/18 revealed The patient's previously identified abnormal lesions is slightly greater uptake of FDG on today's study compared with prior exam. These findings are concerning for progression of disease"    Hospital Course: Naty St  has lung cancer, HTN, breast cancer, HFpEF, and stroke in October 2017 who presents to the ED with a complaint of fatigue and worsening DELA CRUZ. Pt diagnosed PNA 4/9 and was discharged on 4/16 on 3L oxygen. She was initially placed on cefepime for 3 days followed by an add'l 2 days of Augmentin. Pulm was consulted with assistance as her oxygen requirements were increasing. She was placed on oral steroids, then trailed on 80mg TID solumedrol for 2 days and will be discharged on a prednisone taper. She was also diuresed with 2 days of 40mg IV lasix with appropriate UOP resulting, improvement on repeat CXR. She was medically stable and appropriate for DC home with home health on 1L continuous O2. She will be seen for close f/u and may be able to come off oxygen at that time.     Consults:   Consults         Status Ordering Provider     Inpatient consult to Hematology  Once     Provider:  (Not yet assigned)    Completed AYUSH MELGOZA " SHANNAN     Inpatient consult to Midline team  Once     Provider:  (Not yet assigned)    Completed ROSEANN VARNER     Inpatient consult to Pulmonology  Once     Provider:  (Not yet assigned)    Completed MUNDO AMBRIZ     Inpatient consult to Social Work/Case Management  Once     Provider:  (Not yet assigned)    Completed ROSEANN VARNER        Significant Diagnostic Studies: Labs:   BMP:     Recent Labs  Lab 05/01/18  0459 05/02/18  0440   * 147*    140   K 4.4 4.4    106   CO2 27 27   BUN 30* 30*   CREATININE 1.5* 1.6*   CALCIUM 8.4* 8.4*   MG 2.4 2.4   , CMP     Recent Labs  Lab 05/01/18  0459 05/02/18  0440    140   K 4.4 4.4    106   CO2 27 27   * 147*   BUN 30* 30*   CREATININE 1.5* 1.6*   CALCIUM 8.4* 8.4*   PROT 5.5* 5.7*   ALBUMIN 2.2* 2.3*   BILITOT 0.3 0.3   ALKPHOS 70 71   AST 16 18   ALT 16 19   ANIONGAP 8 7*   ESTGFRAFRICA 39.6* 36.6*   EGFRNONAA 34.3* 31.7*    and CBC     Recent Labs  Lab 05/01/18  0459 05/02/18  0440   WBC 16.16* 17.80*   HGB 10.1* 10.5*   HCT 33.3* 34.7*    294       Pending Diagnostic Studies:     None        Final Active Diagnoses:    Diagnosis Date Noted POA    PRINCIPAL PROBLEM:  Acute on chronic respiratory failure with hypoxia [J96.21] 04/27/2018 Yes    Pulmonary infiltrates on CXR [R91.8] 04/30/2018 Yes    Debility [R53.81] 04/27/2018 Yes    Metabolic encephalopathy [G93.41] 04/27/2018 Yes    Generalized weakness [R53.1] 04/27/2018 Yes    UTI (urinary tract infection) [N39.0] 04/15/2018 Yes    Diastolic dysfunction [I51.9] 04/10/2018 Yes    History of stroke [Z86.73] 04/09/2018 Not Applicable    Seizure disorder [G40.909] 04/09/2018 Yes    Anticoagulant long-term use [Z79.01] 11/27/2017 Not Applicable    CKD (chronic kidney disease) stage 3, GFR 30-59 ml/min [N18.3] 04/07/2017 Yes    Malignant neoplasm of lower lobe of right lung [C34.31] 03/17/2016 Yes    Essential hypertension [I10] 08/25/2014 Yes      Problems  "Resolved During this Admission:    Diagnosis Date Noted Date Resolved POA    Hyperkalemia [E87.5] 04/27/2018 04/28/2018 Yes      Discharged Condition: stable    Disposition: Home or Self Care    Follow Up:  Follow-up Information     Olvin Mars MD In 2 weeks.    Specialty:  Internal Medicine  Contact information:  [address]  [facility] [address]  [phone]                 Patient Instructions:     OXYGEN FOR HOME USE   Order Specific Question Answer Comments   Liter Flow 1    Duration Continuous    Qualifying SpO2: 86%    Testing done at: Exercise/Activity    Route nasal cannula    Device home concentrator with portable unit Patient already has home O2   Length of need (in months): 3 mos    Height: 5' 5" (1.651 m)    Weight: 61.8 kg (136 lb 3.9 oz)    Does patient have medical equipment at home? walker, rolling    Does patient have medical equipment at home? bedside commode    Does patient have medical equipment at home? bath bench    Alternative treatment measures have been tried or considered and deemed clinically ineffective. Yes      Ambulatory referral to Outpatient Case Management   Referral Priority: Routine Referral Type: Consultation   Referral Reason: Specialty Services Required    Number of Visits Requested: 1      Ambulatory Referral to Cardiology   Referral Priority: Routine Referral Type: Consultation   Referral Reason: Specialty Services Required    Requested Specialty: Cardiology    Number of Visits Requested: 1      Ambulatory Referral to Pulmonology   Referral Priority: Routine Referral Type: Consultation   Referral Reason: Specialty Services Required    Requested Specialty: Pulmonary Disease    Number of Visits Requested: 1      Diet Adult Regular     Activity as tolerated     Notify your health care provider if you experience any of the following:  temperature >100.4     Notify your health care provider if you experience any of the following:  increased confusion or weakness "     Notify your health care provider if you experience any of the following:  persistent dizziness, light-headedness, or visual disturbances       Medications:  Reconciled Home Medications:      Medication List      START taking these medications    predniSONE 10 MG tablet  Commonly known as:  DELTASONE  Take 4 tablets (40 mg total) by mouth once daily. x7 days Then 2 tabs daily x7 days Then 1 tab daily x7 days. Then 1/2 tab daily x4 days        CONTINUE taking these medications    amitriptyline 50 MG tablet  Commonly known as:  ELAVIL  Take 1 tablet (50 mg total) by mouth every evening.     amLODIPine 5 MG tablet  Commonly known as:  NORVASC  Take 1 tablet (5 mg total) by mouth once daily. Hold until you follow up with your PCP as your BP has been on the lower side.     apixaban 5 mg Tab  Take 1 tablet (5 mg total) by mouth 2 (two) times daily.     atorvastatin 40 MG tablet  Commonly known as:  LIPITOR  Take 1 tablet (40 mg total) by mouth once daily.     clindamycin phosphate 1% 1 % gel  Commonly known as:  CLINDAGEL  Apply topically 2 (two) times daily.     CREON Cpdr  Generic drug:  lipase-protease-amylase 12,000-38,000-60,000 units  TAKE ONE CAPSULE BY MOUTH THREE TIMES A DAY WITH MEALS     denosumab 120 mg/1.7 mL (70 mg/mL) Soln  Commonly known as:  XGEVA  Inject 120 mg into the skin every 28 days.     dronabinol 5 MG capsule  Commonly known as:  MARINOL  Take 1 capsule (5 mg total) by mouth 2 (two) times daily before meals.     DUKE'S SOLUTION  Take 10 mLs by mouth 4 (four) times daily as needed (mouth sores/pain).     ergocalciferol 50,000 unit Cap  Commonly known as:  ERGOCALCIFEROL  Take 1 capsule (50,000 Units total) by mouth every 7 days. Take weekly for 8 weeks and once thereafter     ferrous sulfate 325 mg (65 mg iron) Tab tablet  Take 1 tablet (325 mg total) by mouth 2 (two) times daily.     levETIRAcetam 750 MG Tab  Commonly known as:  KEPPRA  Take 1 tablet (750 mg total) by mouth 2 (two) times  daily.     LORazepam 1 MG tablet  Commonly known as:  ATIVAN  Take 1 tablet (1 mg total) by mouth 2 (two) times daily.     meclizine 12.5 mg tablet  Commonly known as:  ANTIVERT  Take 1-2 tablets (12.5-25 mg total) by mouth 3 (three) times daily as needed for Dizziness.     metoprolol succinate 50 MG 24 hr tablet  Commonly known as:  TOPROL-XL  Take 1 tablet (50 mg total) by mouth once daily. Hold until your appointment with your PCP. HR and BP has been normal off this medications.     mirabegron 50 mg Tb24  Take 1 tablet (50 mg total) by mouth once daily.     multivitamin per tablet  Commonly known as:  THERAGRAN  Take 1 tablet by mouth once daily.     sodium bicarbonate 650 MG tablet  Take 1 tablet (650 mg total) by mouth 3 (three) times daily. Increase dose to 2 60 mg tabs by mouth three times daily.     traMADol 50 mg tablet  Commonly known as:  ULTRAM  Take 1 tablet (50 mg total) by mouth every 6 (six) hours as needed for Pain.        STOP taking these medications    osimertinib 80 mg Tab  Commonly known as:  RASHADRISSO          Maurisio Bergeron MD  Hematology/Oncology, PGY-1  [facility]    I approve this discharge summary and plan

## 2018-05-02 NOTE — DISCHARGE INSTRUCTIONS
:  [facility], [phone], to provide nursing, aide, physical and occupational therapy, and  services. Services will begin with the nurse admission assessment visit on the day after discharge from the hospital.  Jossy Griggs, RAMAN, [facility]  [phone]

## 2018-05-02 NOTE — TELEPHONE ENCOUNTER
----- Message from Kim Holt sent at 5/2/2018  1:02 PM CDT -----  Contact: Pt son Basil  Pt son calling stating that he is waiting for a return call from Zandra Gracia call back number [phone] ext [phone]

## 2018-05-02 NOTE — PROGRESS NOTES
Called [facility] liason Lisa at ext. [phone] and left a VM message this morning. She returned the call and said that the unit is moving to the new campus over today and tomorrow, and Friday will be the earliest that patient's information would be reviewed again. Informed Dr. East and the team during rounds. Other option would be SNF in a nursing home facility. Patient continued to improve and is medically stable for d/c today. Her oxygen requirements have decreased down to 2 to 1 L NC.  Dr. East discussed with patient, who gave consideration. Received call this afternoon from the Resident who informed me that patient is being d/c'ed home. Home health and oxygen orders written. [facility] exst[facility] [phone], and she coordinated referral with the agency's office staff. Home health services to begin tomorrow with the nurse admission assessment visit. Patient already has home oxygen through [facility]. Patient's nurse Jacey called me as I was finalizing the home health arrangements. Discussed with her and let her know that patient's family will need to bring her portable oxygen from home for her to use during transport home. Patient is returning home with her  via family car. No other d/c needs indicated at this time.

## 2018-05-02 NOTE — PROGRESS NOTES
"Progress Note  Pulmonary Medicine      SUBJECTIVE:     Reason for consult: acute hypoxemic respiratory failure    LOS: 5 days    Interval history  No acute events overnight. VSS, afebrile. Pt is comfortable and not SOB in bed on 1.5 LPM. She says she has desaturations with exertion. Overall she says she feels her breathing is "much better".     Scheduled Medications   amLODIPine  5 mg Oral Daily    amoxicillin-clavulanate 500-125mg  1 tablet Oral BID    apixaban  5 mg Oral BID    atorvastatin  40 mg Oral Daily    dronabinol  5 mg Oral BID AC    levETIRAcetam  750 mg Oral BID    lipase-protease-amylase 12,000-38,000-60,000 units  1 capsule Oral TID WM    methylPREDNISolone sodium succinate  80 mg Intravenous TID    metoprolol succinate  50 mg Oral Daily    multivitamin  1 tablet Oral Daily    sodium bicarbonate  650 mg Oral TID       Medications PRN  acetaminophen, albuterol-ipratropium 2.5mg-0.5mg/3mL, dextrose 50%, dextrose 50%, glucagon (human recombinant), glucose, glucose, LORazepam, ondansetron, senna-docusate 8.6-50 mg, sodium chloride 0.9%      OBJECTIVE:     Temp:  [97.8 °F (36.6 °C)-98.8 °F (37.1 °C)]   Pulse:  [69-73]   Resp:  [18-20]   BP: (119-146)/(58-68)   SpO2:  [93 %-99 %]     Vitals:    05/02/18 0806   BP: (!) 146/68   Pulse: 69   Resp: 18   Temp: 97.8 °F (36.6 °C)         I & O (Last 24H):  I/O last 3 completed shifts:  In: 1018 [P.O.:1018]  Out: 400 [Urine:400]    I/O this shift:  In: -   Out: 400 [Urine:400]      Physical Exam:  Constitutional: Patient is oriented to person, place, and time. No distress.   Neck: Normal range of motion. Neck supple. No JVD present.   Cardiovascular: Normal rate, regular rhythm, normal heart sounds and intact distal pulses.  Exam reveals no gallop and no friction rub.  No murmur heard.  Pulmonary/Chest: Effort normal. No distress. Patient has no wheezes. Patient has diffuse faint rales.   Abdominal: Soft. Bowel sounds are normal.   Musculoskeletal: " Patient exhibits no edema or tenderness.   Skin: Skin is warm and dry. No rash noted. Patient is not diaphoretic. No erythema. No pallor.        Laboratory:  No results for input(s): INR in the last 168 hours.    Recent Labs  Lab 04/30/18  0435 05/01/18  0459 05/02/18  0440   WBC 14.87* 16.16* 17.80*   HGB 10.5* 10.1* 10.5*   HCT 35.0* 33.3* 34.7*    288 294     Lab Results   Component Value Date    TSH 2.938 10/08/2017     Recent Labs  Lab 04/30/18  0435 05/01/18  0459 05/02/18  0440    140 140   K 4.4 4.4 4.4    105 106   CO2 29 27 27   BUN 26* 30* 30*   CREATININE 1.6* 1.5* 1.6*   CALCIUM 8.6* 8.4* 8.4*   PROT 5.7* 5.5* 5.7*   BILITOT 0.3 0.3 0.3   ALKPHOS 76 70 71   ALT 12 16 19   AST 15 16 18       Recent Labs  Lab 04/30/18  0435 05/01/18  0459 05/02/18  0440   MG 2.3 2.4 2.4     Lab Results   Component Value Date    CALCIUM 8.4 (L) 05/02/2018    PHOS 2.5 (L) 05/02/2018         Recent Labs  Lab 04/27/18  1847   TROPONINI 0.028*       ABG  No results for input(s): PH, PO2, PCO2, HCO3, BE in the last 168 hours.    Diagnostic Results:  Reviewed  CXR today shows considerable improvement in basilar opacities.         Assessment / Recommendations     * Acute on chronic respiratory failure with hypoxia        A. Drug induced lung injury from Osimertinib -   Given that other differentials seem less likely, we favor this as her primary pulmonary diagnosis. She has completed 48 hours of Solumedrol. Start prednisone taper:  - tomorrow, start 40 mg PO daily x 1 week;  - followed by 20 mg PO daily x 1 week;  - followed by 10 mg PO daily x 1 week;  - then stop.      B. Volume overload - Improved, per CXR today.      C. Progressive malignancy      D. Infection - No clear clinical evidence of infection. .             Pt seen and plan discussed with Dr. Taveras, staff.     Van Allen MD  Fellow, [facility] Pulmonary & Critical Care Medicine

## 2018-05-02 NOTE — PLAN OF CARE
[facility]    HOME HEALTH ORDERS  FACE TO FACE ENCOUNTER    Patient Name: Naty St  YOB: 1945    PCP: Olvin Mars MD   PCP Address: [address] / [place] [address]  PCP Phone Number: [phone]  PCP Fax: [phone]    Encounter Date: 05/02/2018    Admit to Home Health    Diagnoses:  Active Hospital Problems    Diagnosis  POA    *Acute on chronic respiratory failure with hypoxia [J96.21]  Yes     Priority: 1 - High    Pulmonary infiltrates on CXR [R91.8]  Yes    Debility [R53.81]  Yes    Metabolic encephalopathy [G93.41]  Yes    Generalized weakness [R53.1]  Yes    UTI (urinary tract infection) [N39.0]  Yes    Diastolic dysfunction [I51.9]  Yes    History of stroke [Z86.73]  Not Applicable    Seizure disorder [G40.909]  Yes    Anticoagulant long-term use [Z79.01]  Not Applicable    CKD (chronic kidney disease) stage 3, GFR 30-59 ml/min [N18.3]  Yes    Malignant neoplasm of lower lobe of right lung [C34.31]  Yes    Essential hypertension [I10]  Yes      Resolved Hospital Problems    Diagnosis Date Resolved POA    Hyperkalemia [E87.5] 04/28/2018 Yes       Future Appointments  Date Time Provider Department Center   5/4/2018 10:40 AM LAB, HEMONC CANCER BLDG [facility]   5/4/2018 11:45 AM Karrie Vazquez MD [facility] HEM ONC [facility]   5/4/2018 12:45 PM INJECTION, [facility] INFUSION [facility]   5/18/2018 10:30 AM LAB, APPOINTMENT [place] [facility]   5/18/2018 10:35 AM SPECIMEN, [facility] SPECLAB [facility]   5/24/2018 11:30 AM Mihaela Bazzi MD [facility] NEPHRO [facility]     Follow-up Information     Olvin Mars MD In 2 weeks.    Specialty:  Internal Medicine  Contact information:  [address]  [facility] [address]  [phone]                     I have seen and examined this patient face to face today. My clinical findings that support the need for the home health skilled  "services and home bound status are the following:  Weakness/numbness causing balance and gait disturbance due to Malignancy/Cancer making it taxing to leave home.  Medical restrictions requiring assistance of another human to leave home due to  Home oxygen requirement.    Allergies:  Review of patient's allergies indicates:   Allergen Reactions    Tobradex [tobramycin-dexamethasone] Swelling    Iodinated contrast- oral and iv dye Hives    Morphine Other (See Comments)     "shaking" and tremors    Latex Rash    Phenytoin sodium extended Other (See Comments) and Rash       Diet: regular diet    Activities: activity as tolerated    Nursing:   SN to complete comprehensive assessment including routine vital signs. Instruct on disease process and s/s of complications to report to MD. Review/verify medication list sent home with the patient at time of discharge  and instruct patient/caregiver as needed. Frequency may be adjusted depending on start of care date.    Notify MD if SBP > 160 or < 90; DBP > 90 or < 50; HR > 120 or < 50; Temp > 101; Other:         CONSULTS:    Physical Therapy to evaluate and treat. Evaluate for home safety and equipment needs; Establish/upgrade home exercise program. Perform / instruct on therapeutic exercises, gait training, transfer training, and Range of Motion.  Occupational Therapy to evaluate and treat. Evaluate home environment for safety and equipment needs. Perform/Instruct on transfers, ADL training, ROM, and therapeutic exercises.   to evaluate for community resources/long-range planning.  Aide to provide assistance with personal care, ADLs, and vital signs.    MISCELLANEOUS CARE:  Home Oxygen:  Oxygen at 1 L/min nasal canula to be used:  Continuously.    WOUND CARE ORDERS  n/a      Medications: Review discharge medications with patient and family and provide education.      Current Discharge Medication List      START taking these medications    Details   predniSONE " (DELTASONE) 10 MG tablet Take 4 tablets (40 mg total) by mouth once daily. x7 days Then 2 tabs daily x7 days Then 1 tab daily x7 days. Then 1/2 tab daily x4 days  Qty: 51 tablet, Refills: 0         CONTINUE these medications which have NOT CHANGED    Details   amitriptyline (ELAVIL) 50 MG tablet Take 1 tablet (50 mg total) by mouth every evening.  Qty: 30 tablet, Refills: 2    Comments: This prescription was filled today(12/27/2016). Any refills authorized will be placed on file.  Associated Diagnoses: Depression, unspecified depression type      amLODIPine (NORVASC) 5 MG tablet Take 1 tablet (5 mg total) by mouth once daily. Hold until you follow up with your PCP as your BP has been on the lower side.  Qty: 30 tablet, Refills: 11      apixaban 5 mg Tab Take 1 tablet (5 mg total) by mouth 2 (two) times daily.  Qty: 60 tablet, Refills: 0      atorvastatin (LIPITOR) 40 MG tablet Take 1 tablet (40 mg total) by mouth once daily.  Qty: 90 tablet, Refills: 3      CREON CpDR TAKE ONE CAPSULE BY MOUTH THREE TIMES A DAY WITH MEALS  Qty: 270 capsule, Refills: 3      dronabinol (MARINOL) 5 MG capsule Take 1 capsule (5 mg total) by mouth 2 (two) times daily before meals.  Qty: 60 capsule, Refills: 3    Associated Diagnoses: Malignant neoplasm of lower lobe of right lung; Anorexia      ferrous sulfate 325 mg (65 mg iron) Tab tablet Take 1 tablet (325 mg total) by mouth 2 (two) times daily.  Qty: 180 tablet, Refills: 2    Associated Diagnoses: Anemia of chronic renal failure, stage 3 (moderate); Vitamin D deficiency; CKD (chronic kidney disease) stage 3, GFR 30-59 ml/min; Hypomagnesemia      levETIRAcetam (KEPPRA) 750 MG Tab Take 1 tablet (750 mg total) by mouth 2 (two) times daily.  Qty: 60 tablet, Refills: 3    Associated Diagnoses: Secondary adenocarcinoma of brain      LORazepam (ATIVAN) 1 MG tablet Take 1 tablet (1 mg total) by mouth 2 (two) times daily.  Qty: 60 tablet, Refills: 1    Comments: This prescription was filled  on 10/6/2017. Any refills authorized will be placed on file.      meclizine (ANTIVERT) 12.5 mg tablet Take 1-2 tablets (12.5-25 mg total) by mouth 3 (three) times daily as needed for Dizziness.  Qty: 30 tablet, Refills: 0      metoprolol succinate (TOPROL-XL) 50 MG 24 hr tablet Take 1 tablet (50 mg total) by mouth once daily. Hold until your appointment with your PCP. HR and BP has been normal off this medications.  Qty: 30 tablet, Refills: 11      mirabegron 50 mg Tb24 Take 1 tablet (50 mg total) by mouth once daily.  Qty: 30 tablet, Refills: 11    Associated Diagnoses: OAB (overactive bladder)      multivitamin (THERAGRAN) per tablet Take 1 tablet by mouth once daily.      NYSTATIN (DUKE'S SOLUTION) Take 10 mLs by mouth 4 (four) times daily as needed (mouth sores/pain).  Qty: 240 mL, Refills: 0      sodium bicarbonate 650 MG tablet Take 1 tablet (650 mg total) by mouth 3 (three) times daily. Increase dose to 2 60 mg tabs by mouth three times daily.  Qty: 270 tablet, Refills: 11      clindamycin phosphate 1% (CLINDAGEL) 1 % gel Apply topically 2 (two) times daily.  Qty: 60 g, Refills: 6    Associated Diagnoses: Acneiform drug eruption      denosumab (XGEVA) 120 mg/1.7 mL (70 mg/mL) Soln Inject 120 mg into the skin every 28 days.      ergocalciferol (ERGOCALCIFEROL) 50,000 unit Cap Take 1 capsule (50,000 Units total) by mouth every 7 days. Take weekly for 8 weeks and once thereafter  Qty: 12 capsule, Refills: 3    Associated Diagnoses: Anemia of chronic renal failure, stage 3 (moderate); Vitamin D deficiency; CKD (chronic kidney disease) stage 3, GFR 30-59 ml/min; Hypomagnesemia      traMADol (ULTRAM) 50 mg tablet Take 1 tablet (50 mg total) by mouth every 6 (six) hours as needed for Pain.  Qty: 30 tablet, Refills: 0    Associated Diagnoses: Neoplasm related pain (acute) (chronic)         STOP taking these medications       osimertinib (TAGRISSO) 80 mg Tab Comments:   Reason for Stopping:               I certify  that this patient is confined to her home and needs intermittent skilled nursing care, physical therapy and occupational therapy.    Maurisio Bergeron MD  Internal Medicine PGY-1  [facility]

## 2018-05-02 NOTE — TELEPHONE ENCOUNTER
How should I answer him----  Her chemo will be on hold while she is in the hospital.  Then, in SNF, will she be able to receive it?   Or not until after SNF DC?  ~wen

## 2018-05-02 NOTE — PROGRESS NOTES
[facility]  Hematology/Oncology  Progress Note    Patient Name: Naty St  Admission Date: 4/27/2018  Hospital Length of Stay: 5 days  Code Status: Full Code     Subjective:     HPI:  The patient is a 72 y.o. female with lung cancer, HTN, breast cancer,HFpEF, and stroke in October 2017 who presents to the ED with a complaint of fatigue and worsening DELA CRUZ.  Reports that she has not been able to ambulate secondary to fatigue for the past week.   patient states she is able to stand but is too weak/fatigued to actually walk. Denies focal deficits. Son notes appetite has been decreased, but pt ate well tonight.  Pt diagnosed PNA 4/9 and was discharged on 4/16 on 3L oxygen.  She is on blood thinners due to blood clot two years ago in her LLE.  Son also notes slower cognitive function and states that she asked what her 's name was earlier today, which is not normal for her. Her DELA CRUZ has also been worsening for a week. She becomes very dyspneic with minimal movement in bed. She denies fevers and chills at home, dysuria, diarrhea.    Oncologic History:  Ms. Naty St was admitted to the hospital between 01/25/2016 and 01/28/2016. She has a history of pancreatic cancer 17 years ago, breast cancer and meningioma, presented to the hospital complaining of loss of consciousness. The patient apparently was in her normal state of health and she stood up to go to the bathroom and fell to the floor. The patient's daughter helped the mother up in the bathroom and noted that her mother's upper extremities were shaking and eye rolling. No reports of bowel or bladder incontinence, tongue biting or rolling. The second episode lasted about four minutes and she was extremely lethargic following that. Apparently, the patient had a meningioma resection done in the past; however, recently, underwent neurosurgical resection with Dr. 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    Additional immunohistochemical stains were performed which revealed the tumor cells to be are positive for TTF1 and negative for ER and GCDFP. The morphology and TTF1 positivity are most consistent with lung primary.    Of note, imaging scan at the end of January 2016 revealed a mass in the lung at 2 cm in the medial aspect of the apical segment of the right upper lobe abutting the mediastinum at the level of the azygous vein and abutting and possibly encasing the segmental bronchi and vessels of the apical segment of the right upper lobe and no pleural fluid was present. Also, there is an enlarged right paratracheal lymph node. No evidence of any metastatic disease at the pancreatic site with postoperative changes post Whipple disease  Her PET Scan from 2/15/16 reveal "Hypermetabolic mass in the right lung apex consistent with a primary malignancy. Hypermetabolic mediastinal lymph nodes consistent with metastatic disease. Right sacral hypermetabolic lesion consistent with metastatic disease, noting additional mildly sclerotic lesions in multiple vertebral bodies which do not demonstrate abnormal hypermetabolism  She underwent IR bone biopsy which revealed metastatic adenocarcinoma of lung origin. 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  She has completed focal RT to brain lesions in March 2016.    PET scan from 6/6/16 shows "Dramatic almost complete response to therapy."  Lovenox started after US lower ext 6/7/16 shows Acute complete occlusion of one of the left posterior tibial vein.Remote partial thrombus of the proximal left superficial femoral vein.  She is on Tarceva.   12/6/16 PET scan reveals "Stable right upper lobe lesion and right hilar lymph node. No new lesions identified. Trace left pleural effusion".  1/27/17 Renal u/s "Medical renal disease. Nonobstructive right nephrolithiasis"  1/31/17 PET - "Right " "upper lobe nodule and right hilar lymph node similar and very low grade activity.  There is no definite evidence of recurrence."   11/9/17 PET "In this patient with history of lung cancer, there is interval increase in size and hypermetabolism of a right upper lobe lung lesion concerning for recurrent disease. Additionally, there is interval appearance of a hypermetabolic paratracheal lymph node suspicious for metastatic disease"         She progressed on Tarceva She underwent EBUS on 12/5/17. Pathology revealed adenocarcinoma but T790m could not be done as quantity was not insufficient. Her PET scan from 1/30/18 revealed The patient's previously identified abnormal lesions is slightly greater uptake of FDG on today's study compared with prior exam. These findings are concerning for progression of disease"    Interval History: NAEON. Patient is awaiting placement with SNF    Oncology Treatment Plan:   [No treatment plan]    Medications:  Continuous Infusions:  Scheduled Meds:   amLODIPine  5 mg Oral Daily    amoxicillin-clavulanate 500-125mg  1 tablet Oral BID    apixaban  5 mg Oral BID    atorvastatin  40 mg Oral Daily    dronabinol  5 mg Oral BID AC    levETIRAcetam  750 mg Oral BID    lipase-protease-amylase 12,000-38,000-60,000 units  1 capsule Oral TID WM    methylPREDNISolone sodium succinate  80 mg Intravenous TID    metoprolol succinate  50 mg Oral Daily    multivitamin  1 tablet Oral Daily    sodium bicarbonate  650 mg Oral TID     PRN Meds:acetaminophen, albuterol-ipratropium 2.5mg-0.5mg/3mL, dextrose 50%, dextrose 50%, glucagon (human recombinant), glucose, glucose, LORazepam, ondansetron, senna-docusate 8.6-50 mg, sodium chloride 0.9%     Review of Systems   Constitutional: Negative for chills and fever.   Respiratory: Positive for shortness of breath (improving). Negative for cough.    Cardiovascular: Negative for chest pain and palpitations.   Gastrointestinal: Negative for abdominal pain, " constipation, diarrhea, nausea and vomiting.   Skin: Negative for rash.   Neurological: Negative for headaches.     Objective:     Vital Signs (Most Recent):  Temp: 97.8 °F (36.6 °C) (05/02/18 0806)  Pulse: 69 (05/02/18 0806)  Resp: 18 (05/02/18 0806)  BP: (!) 146/68 (05/02/18 0806)  SpO2: 96 % (05/02/18 0808) Vital Signs (24h Range):  Temp:  [97.8 °F (36.6 °C)-98.8 °F (37.1 °C)] 97.8 °F (36.6 °C)  Pulse:  [69-73] 69  Resp:  [18-20] 18  SpO2:  [93 %-99 %] 96 %  BP: (119-146)/(58-68) 146/68     Weight: 61.8 kg (136 lb 3.9 oz)  Body mass index is 22.67 kg/m².  Body surface area is 1.68 meters squared.      Intake/Output Summary (Last 24 hours) at 05/02/18 0833  Last data filed at 05/01/18 1538   Gross per 24 hour   Intake              658 ml   Output              400 ml   Net              258 ml       Physical Exam   Constitutional: She is oriented to person, place, and time. She appears well-developed and well-nourished.   Eyes: Right eye exhibits no discharge. Left eye exhibits no discharge.   Cardiovascular: Normal rate, regular rhythm and normal heart sounds.  Exam reveals no gallop and no friction rub.    No murmur heard.  Pulmonary/Chest: Effort normal and breath sounds normal. No respiratory distress. She has no wheezes. She has no rales.   Abdominal: Soft. Bowel sounds are normal. She exhibits no distension. There is no tenderness. There is no rebound and no guarding.   Neurological: She is alert and oriented to person, place, and time.       Significant Labs:   Recent Results (from the past 24 hour(s))   Comprehensive Metabolic Panel (CMP)    Collection Time: 05/02/18  4:40 AM   Result Value Ref Range    Sodium 140 136 - 145 mmol/L    Potassium 4.4 3.5 - 5.1 mmol/L    Chloride 106 95 - 110 mmol/L    CO2 27 23 - 29 mmol/L    Glucose 147 (H) 70 - 110 mg/dL    BUN, Bld 30 (H) 8 - 23 mg/dL    Creatinine 1.6 (H) 0.5 - 1.4 mg/dL    Calcium 8.4 (L) 8.7 - 10.5 mg/dL    Total Protein 5.7 (L) 6.0 - 8.4 g/dL    Albumin  2.3 (L) 3.5 - 5.2 g/dL    Total Bilirubin 0.3 0.1 - 1.0 mg/dL    Alkaline Phosphatase 71 55 - 135 U/L    AST 18 10 - 40 U/L    ALT 19 10 - 44 U/L    Anion Gap 7 (L) 8 - 16 mmol/L    eGFR if African American 36.6 (A) >60 mL/min/1.73 m^2    eGFR if non  31.7 (A) >60 mL/min/1.73 m^2   Magnesium    Collection Time: 05/02/18  4:40 AM   Result Value Ref Range    Magnesium 2.4 1.6 - 2.6 mg/dL   Phosphorus    Collection Time: 05/02/18  4:40 AM   Result Value Ref Range    Phosphorus 2.5 (L) 2.7 - 4.5 mg/dL   CBC with Automated Differential    Collection Time: 05/02/18  4:40 AM   Result Value Ref Range    WBC 17.80 (H) 3.90 - 12.70 K/uL    RBC 4.05 4.00 - 5.40 M/uL    Hemoglobin 10.5 (L) 12.0 - 16.0 g/dL    Hematocrit 34.7 (L) 37.0 - 48.5 %    MCV 86 82 - 98 fL    MCH 25.9 (L) 27.0 - 31.0 pg    MCHC 30.3 (L) 32.0 - 36.0 g/dL    RDW 17.3 (H) 11.5 - 14.5 %    Platelets 294 150 - 350 K/uL    MPV 9.8 9.2 - 12.9 fL    Immature Granulocytes 2.0 (H) 0.0 - 0.5 %    Gran # (ANC) 15.9 (H) 1.8 - 7.7 K/uL    Immature Grans (Abs) 0.36 (H) 0.00 - 0.04 K/uL    Lymph # 1.1 1.0 - 4.8 K/uL    Mono # 0.5 0.3 - 1.0 K/uL    Eos # 0.0 0.0 - 0.5 K/uL    Baso # 0.02 0.00 - 0.20 K/uL    nRBC 0 0 /100 WBC    Gran% 89.2 (H) 38.0 - 73.0 %    Lymph% 6.2 (L) 18.0 - 48.0 %    Mono% 2.5 (L) 4.0 - 15.0 %    Eosinophil% 0.0 0.0 - 8.0 %    Basophil% 0.1 0.0 - 1.9 %    Differential Method Automated      Diagnostic Results:  No new imaging or procedures to review since prior assessment    Assessment/Plan:     * Acute on chronic respiratory failure with hypoxia    Continues to improve   -Etiology remains unclear; DDx includes worsening lung cancer, PNA, volume overload, or medication induced pneumonitis  -Pulmonary recommended IV solumedrol, last dose today 05/02, will start prednisone taper on DC   -Will reassess oxygen needs today.  -Echo shows some intermediate diastolic dysfunction; Cardiology will f/u outpatient.      Generalized weakness     Improving   Patient has had an acute on chronic worsening of weakness and debility in the last week.   -CT head unrevealing for acute process  -Possibly related to UTI  -PT/OT following      Metabolic encephalopathy    Improving  Son reports some difficulty with word finding after stroke but has had much more difficulty in past week  -CT head to r/o stroke, no bleed identified      Debility    PT/OT recommend SNF; however, feel the patient may continue to improve acutely.      UTI (urinary tract infection)    -Urine cx revealing for enterococcus faecalis, though may be asymptomatic bacteriuria as patient is without urinary complaint  -Pt on day 4 of antibiotics.  -Will continue Augmentin for now.      Diastolic dysfunction    -s/p lasix 40mg IV x1 dose yesterday 04/28 w/ 1800 cc UOP and 04/29 w/ 1700 cc UOP  -Echo continues to show diastolic dysfunction but shows right wall motion abnormalities.  -Will contact cardiology and discuss the patient.  -Will continue to monitor      History of stroke    - cont atorvastatin      Seizure disorder    - cont home dose keppra      Anticoagulant long-term use    Patient on apixiban for DVT diagnosed two years ago in her LLE  Will continue apixaban.      CKD (chronic kidney disease) stage 3, GFR 30-59 ml/min    Baseline, stable  -CR 1.5 today      Essential hypertension    Improving   Amlodipine 5mg daily restarted  -Lopressor 25mg BID added 04/29/2018 for HTN and persistent tachycardia as well         Dispo: Likely home today 05/02 with     Staff attestation to follow, treatment plan not yet discussed with attending physician.    Maurisio Bergeron MD  Hematology/Oncology  [facility]

## 2018-05-02 NOTE — TELEPHONE ENCOUNTER
Spoke with patient's son.  Informed him patient will have to be reassessed once she is DC'd from the hospital +/- SNF to decide what her plan of care will be.  Son voiced understanding.

## 2018-05-02 NOTE — PLAN OF CARE
Problem: Patient Care Overview  Goal: Plan of Care Review  Outcome: Ongoing (interventions implemented as appropriate)  Patient without pain or nausea. Remains on nasal cannula 1.5L to maintain sats greater than 93%. Instructed patient to call for assistance, bed low and locked, call bell within reach, nonskid socks on, patient verbalized understanding.

## 2018-05-02 NOTE — ASSESSMENT & PLAN NOTE
-Etiology remains unclear; DDx includes worsening lung cancer, PNA, volume overload, or medication induced pneumonitis  -Pulmonary consulted and recommends IV solumedrol  -Will reassess oxygen needs today.  -Echo shows some intermediate diastolic dysfunction.  Cardiology will f.u outpatient.

## 2018-05-02 NOTE — PROGRESS NOTES
Road Test  Oxygen- Pt on 1LNC without distress  Ambulation- Pt ambulates without assistance  Devices- Pt going home with home health and home oxygen    Tolerating- Pt tolerates a regular diet  Elimination- Pt voids in the toilet without difficulty  Self care- Pt performs self care with minimal assistance  Teaching- Pt given written and verbal discharge instructions.    Pt tolerates 1LNC, ambulation, regular diet, voiding, and self care. Vital signs stable. IV removed, catheter intact, bleeding controlled, site covered with dry gauze and tape. Pt going home with family, home oxygen, and home health. Reviewed discharge instructions and address all questions. Follow-up appointments, medications, signs and symptoms to notify the MD discussed. Pt states has all belongings.  Pt has no questions or needs at this time.

## 2018-05-03 ENCOUNTER — TELEPHONE (OUTPATIENT)
Dept: ADMINISTRATIVE | Facility: CLINIC | Age: 73
End: 2018-05-03

## 2018-05-03 NOTE — PLAN OF CARE
Problem: Occupational Therapy Goal  Goal: Occupational Therapy Goal  Goals to be met by: 5/13/18     Patient will increase functional independence with ADLs by performing:    UE Dressing with Clare.  LE Dressing with Stand-by Assistance.  Grooming while standing at sink with Supervision.  Toileting from toilet with Clare for hygiene and clothing management.   Toilet transfer to toilet with Supervision.     Outcome: Outcome(s) achieved Date Met: 05/03/18  Goals not met; pt d/c from hospital

## 2018-05-03 NOTE — PLAN OF CARE
Problem: Physical Therapy Goal  Goal: Physical Therapy Goal  Goals to be met by: 18     Patient will increase functional independence with mobility by performin. Supine to sit with Set-up [place]  2. Sit to supine with Set-up [place]  3. Sit to stand transfer with Supervision - met   4. Gait  x 150 feet with Supervision using Rolling Walker.       Outcome: Outcome(s) achieved Date Met: 18  Goals updated and appropriate to address patient's current needs.     Precious Freire PT, DPT  5/3/2018  [phone]

## 2018-05-03 NOTE — PT/OT/SLP DISCHARGE
Occupational Therapy Discharge Summary    Naty St  MRN: [id number]   Principal Problem: Acute on chronic respiratory failure with hypoxia      Patient Discharged from acute Occupational Therapy on 5/3/18.  Please refer to prior OT note dated 4/29/18 for functional status.    Assessment:      Patient has not met goals.    Objective:     GOALS:    Occupational Therapy Goals     Not on file          Multidisciplinary Problems (Resolved)        Problem: Occupational Therapy Goal    Goal Priority Disciplines Outcome Interventions   Occupational Therapy Goal   (Resolved)     OT, PT/OT Outcome(s) achieved    Description:  Goals to be met by: 5/13/18     Patient will increase functional independence with ADLs by performing:    UE Dressing with Brooke.  LE Dressing with Stand-by Assistance.  Grooming while standing at sink with Supervision.  Toileting from toilet with Brooke for hygiene and clothing management.   Toilet transfer to toilet with Supervision.                      Reasons for Discontinuation of Therapy Services  Transfer to alternate level of care.      Plan:     Patient Discharged to: Home with Home Health Service    Linda Orantes, OT  5/3/2018

## 2018-05-03 NOTE — PT/OT/SLP PROGRESS
Physical Therapy Treatment / discharge    Patient Name:  Naty St   MRN:  [id number]    Recommendations:     Discharge Recommendations:  home health PT   Discharge Equipment Recommendations: none   Barriers to discharge: None    Assessment:     Naty St is a 72 y.o. female admitted with a medical diagnosis of Acute on chronic respiratory failure with hypoxia.  She presents with the following impairments/functional limitations:  weakness, impaired endurance, impaired self care skills, gait instability, decreased lower extremity function, impaired cardiopulmonary response to activity, decreased safety awareness, decreased upper extremity function, impaired cognition, impaired balance, impaired functional mobilty.    Rehab Prognosis:  good; patient would benefit from acute skilled PT services to address these deficits and reach maximum level of function.      Recent Surgery: * No surgery found *      Plan:     During this hospitalization, patient to be seen 4 x/week to address the above listed problems via gait training, therapeutic activities, therapeutic exercises, neuromuscular re-education  · Plan of Care Expires:  05/28/18   Plan of Care Reviewed with: patient, family    Subjective     Communicated with nurse prior to session.  Patient found sitting UI with family present, in NAD upon PT entry to room, agreeable to treatment.      Chief Complaint: none  Patient comments/goals: to return home at [facility]  Pain/Comfort:  · Pain Rating 1: 0/10  · Pain Addressed 1: Reposition, Distraction, Cessation of Activity    Patients cultural, spiritual, Yarsani conflicts given the current situation: none stated    Objective:     Patient found with: oxygen (hep lock)     General Precautions: Standard, fall   Orthopedic Precautions:N/A   Braces: N/A     Functional Mobility:  · Transfers:  Sit to Stand:  stand by assistance with no AD  · Gait: 100 feet with CGA and left lateral lean with multiple  LOB  · Balance: poor      AM-PAC 6 CLICK MOBILITY  Turning over in bed (including adjusting bedclothes, sheets and blankets)?: 3  Sitting down on and standing up from a chair with arms (e.g., wheelchair, bedside commode, etc.): 3  Moving from lying on back to sitting on the side of the bed?: 3  Moving to and from a bed to a chair (including a wheelchair)?: 3  Need to walk in hospital room?: 3  Climbing 3-5 steps with a railing?: 3  Total Score: 18       Therapeutic Activities and Exercises:  PT arrived to patient's room to find patient resting quietly; agreeable to PT session. Patient performed mobility as above with non-skid socks in place.    · Patient Education:   · Extensive education on the function of the hip flexors/extensors and ways to properly work them  · Sit to stand  · Transfer training  · Gait training  · PT POC  · PT dc rec to HHPT  · therex program in sitting  · Therapeutic exercise:  · LAQ x 20 bilaterally with ankle DF  · Hip flexion marches x 1 min  · glute sets x 5 with 5 sec holds  Bedside table in front of patient and area set up for function, convenience, and safety. RN aware of patient's mobility needs and status. Questions/concerns addressed within PT scope of practice; patient and family with no further questions.       Patient left up in chair with all lines intact, call button in reach,and family present..    GOALS:    Physical Therapy Goals     Not on file          Multidisciplinary Problems (Resolved)        Problem: Physical Therapy Goal    Goal Priority Disciplines Outcome Goal Variances Interventions   Physical Therapy Goal   (Resolved)     PT/OT, PT Outcome(s) achieved     Description:  Goals to be met by: 18     Patient will increase functional independence with mobility by performin. Supine to sit with Set-up [place]  2. Sit to supine with Set-up [place]  3. Sit to stand transfer with Supervision - met   4. Gait  x 150 feet with Supervision using Rolling  Walker.                        Time Tracking:     PT Received On: 05/02/18  PT Start Time: 1505     PT Stop Time: 1533  PT Total Time (min): 28 min     Billable Minutes: Gait Training 12 and Therapeutic Exercise 16    Treatment Type: Treatment  PT/PTA: PT     PTA Visit Number: 0     Precious Freire, PT  05/03/2018

## 2018-05-04 ENCOUNTER — TELEPHONE (OUTPATIENT)
Dept: INTERNAL MEDICINE | Facility: CLINIC | Age: 73
End: 2018-05-04

## 2018-05-04 ENCOUNTER — OUTPATIENT CASE MANAGEMENT (OUTPATIENT)
Dept: ADMINISTRATIVE | Facility: OTHER | Age: 73
End: 2018-05-04

## 2018-05-04 ENCOUNTER — TELEPHONE (OUTPATIENT)
Dept: HEMATOLOGY/ONCOLOGY | Facility: CLINIC | Age: 73
End: 2018-05-04

## 2018-05-04 NOTE — TELEPHONE ENCOUNTER
----- Message from Malini Zelaya sent at 5/4/2018 10:32 AM CDT -----  Contact: Pt's Son Basil [phone] Ext [phone]  Pt's Son  Basil called to inform the dept that the Pt will be 20 min late for her appt.    Mr Gracia can be reach at # [phone] Ext [phone]    Thanks    Tms

## 2018-05-04 NOTE — TELEPHONE ENCOUNTER
Received phone call from patient's son Basil. He was asking about patient's home health services, the frequency of the different disciplines and schedule of when they'll be seeing patient. Advised him to call [facility] and speak with the  assigned for patient's case, and provided him with [facility]'s phone number. No other needs indicated at this time.

## 2018-05-04 NOTE — TELEPHONE ENCOUNTER
Spoke with son.  Rescheduled patient's appointments to next Tuesday, per son's request, as patient is too tired to come to clinic today, as she was just recently DC'd from the hospital.  Son is agreeable to new appointments and will inform patient of dates/times.

## 2018-05-04 NOTE — TELEPHONE ENCOUNTER
----- Message from Deysi Galdamez sent at 5/3/2018  3:20 PM CDT -----  Contact: Edie/[facility]/[phone]  Edie would like to inform the doctor the pt has been released from the hospital and she will be resuming the home health.

## 2018-05-04 NOTE — PROGRESS NOTES
Attempt to complete initial assessment for Outpatient Care Management; spoke to pt who asked that I call back Monday after lunch time. Will follow up with pt. Loli LEE-OPCM

## 2018-05-07 ENCOUNTER — OUTPATIENT CASE MANAGEMENT (OUTPATIENT)
Dept: ADMINISTRATIVE | Facility: OTHER | Age: 73
End: 2018-05-07

## 2018-05-07 ENCOUNTER — TELEPHONE (OUTPATIENT)
Dept: HEMATOLOGY/ONCOLOGY | Facility: CLINIC | Age: 73
End: 2018-05-07

## 2018-05-07 NOTE — LETTER
May 7, 2018    Natygorge Lopez Maciel  [address] [facility] [place] [address]             [facility]  [address] [facility] [place] [address] [address],    I work with [facility]'s Outpatient Case Management Department. We received a referral to call you to discuss your medical history.These services are free of charge and are offered to [facility] patients who have recently been discharged from any of our facilities or who have complex medical conditions that may require the skill of a nurse to assist with management.             I am a Registered Nurse who specializes in connecting patients with available medical and financial resources as well as addressing any educational needs that may be indicated.      I attempted to reach you by telephone, but I was unsuccessful. Please call our department so that we can go over some questions with you regarding your health.    The Outpatient Case Management Department can be reached at [phone] from 8:00AM to 4:30 PM on Monday thru Friday. [facility] also has a program where a nurse is available 24/7 to answer questions or provide medical advice, their number is [phone].    Thanks,      Maryann Borden, RN-OPCM   Outpatient Complex Case Management

## 2018-05-07 NOTE — PROGRESS NOTES
-- 2nd attempt to complete initial assessment for OPCM, spoke to pt who stated today is not a good day for her and to call her back in a few days. As this is my second unsuccessful attempt to complete initial assessment for OPCM, I will mail a letter with my contact information. Loli LEE-OPCM

## 2018-05-07 NOTE — TELEPHONE ENCOUNTER
Spoke with patient's son.  Reviewed lipitor and eliquis medications with son.  Son thanked nurse.

## 2018-05-07 NOTE — TELEPHONE ENCOUNTER
----- Message from Kim Holt sent at 5/7/2018 10:10 AM CDT -----  Contact: Pt son Basil  Pt son calling regarding pt medication       Basil call back number [phone] ext [phone]

## 2018-05-08 NOTE — PT/OT/SLP DISCHARGE
Physical Therapy Discharge Summary    Name: Naty St  MRN: [id number]   Principal Problem: Acute on chronic respiratory failure with hypoxia     Patient Discharged from acute Physical Therapy on 18.  Please refer to prior PT noted date on 18 for functional status.     Assessment:     Patient was discharged unexpectedly.  Information required to complete an accurate discharge summary is unknown.  Refer to therapy initial evaluation and last progress note for initial and most recent functional status and goal achievement.  Recommendations made may be found in medical record.    Objective:     GOALS:    Physical Therapy Goals     Not on file          Multidisciplinary Problems (Resolved)        Problem: Physical Therapy Goal    Goal Priority Disciplines Outcome Goal Variances Interventions   Physical Therapy Goal   (Resolved)     PT/OT, PT Outcome(s) achieved     Description:  Goals to be met by: 18     Patient will increase functional independence with mobility by performin. Supine to sit with Set-up [place]  2. Sit to supine with Set-up [place]  3. Sit to stand transfer with Supervision - met   4. Gait  x 150 feet with Supervision using Rolling Walker.                        Reasons for Discontinuation of Therapy Services  Transfer to alternate level of care.      Plan:     Patient Discharged to: Home with Home Health Service.    Linda Cantu, PT  2018

## 2018-05-09 ENCOUNTER — TELEPHONE (OUTPATIENT)
Dept: INTERNAL MEDICINE | Facility: CLINIC | Age: 73
End: 2018-05-09

## 2018-05-09 DIAGNOSIS — Z85.3 HISTORY OF BREAST CANCER: ICD-10-CM

## 2018-05-09 DIAGNOSIS — C34.91 ADENOCARCINOMA OF LUNG, RIGHT: ICD-10-CM

## 2018-05-09 DIAGNOSIS — C79.31 BRAIN METASTASES: ICD-10-CM

## 2018-05-09 DIAGNOSIS — I63.441 CEREBRAL INFARCTION DUE TO EMBOLISM OF RIGHT CEREBELLAR ARTERY: Primary | ICD-10-CM

## 2018-05-15 ENCOUNTER — LAB VISIT (OUTPATIENT)
Dept: LAB | Facility: HOSPITAL | Age: 73
End: 2018-05-15
Attending: INTERNAL MEDICINE
Payer: MEDICARE

## 2018-05-15 ENCOUNTER — OFFICE VISIT (OUTPATIENT)
Dept: HEMATOLOGY/ONCOLOGY | Facility: CLINIC | Age: 73
End: 2018-05-15
Payer: MEDICARE

## 2018-05-15 ENCOUNTER — INFUSION (OUTPATIENT)
Dept: INFUSION THERAPY | Facility: HOSPITAL | Age: 73
End: 2018-05-15
Attending: INTERNAL MEDICINE
Payer: MEDICARE

## 2018-05-15 VITALS
SYSTOLIC BLOOD PRESSURE: 140 MMHG | HEART RATE: 75 BPM | HEIGHT: 66 IN | RESPIRATION RATE: 19 BRPM | WEIGHT: 149.06 LBS | TEMPERATURE: 98 F | DIASTOLIC BLOOD PRESSURE: 63 MMHG | OXYGEN SATURATION: 95 % | BODY MASS INDEX: 23.95 KG/M2

## 2018-05-15 DIAGNOSIS — C34.31 MALIGNANT NEOPLASM OF LOWER LOBE OF RIGHT LUNG: ICD-10-CM

## 2018-05-15 DIAGNOSIS — C34.31 MALIGNANT NEOPLASM OF LOWER LOBE OF RIGHT LUNG: Primary | ICD-10-CM

## 2018-05-15 DIAGNOSIS — C79.51 SECONDARY CANCER OF BONE: ICD-10-CM

## 2018-05-15 DIAGNOSIS — C79.31 BRAIN METASTASES: ICD-10-CM

## 2018-05-15 LAB
ALBUMIN SERPL BCP-MCNC: 2.4 G/DL
ALP SERPL-CCNC: 54 U/L
ALT SERPL W/O P-5'-P-CCNC: 54 U/L
ANION GAP SERPL CALC-SCNC: 9 MMOL/L
AST SERPL-CCNC: 30 U/L
BILIRUB SERPL-MCNC: 0.5 MG/DL
BUN SERPL-MCNC: 21 MG/DL
CALCIUM SERPL-MCNC: 8.6 MG/DL
CHLORIDE SERPL-SCNC: 114 MMOL/L
CO2 SERPL-SCNC: 22 MMOL/L
CREAT SERPL-MCNC: 1.2 MG/DL
ERYTHROCYTE [DISTWIDTH] IN BLOOD BY AUTOMATED COUNT: 19 %
EST. GFR  (AFRICAN AMERICAN): 51.8 ML/MIN/1.73 M^2
EST. GFR  (NON AFRICAN AMERICAN): 44.9 ML/MIN/1.73 M^2
GLUCOSE SERPL-MCNC: 107 MG/DL
HCT VFR BLD AUTO: 33.6 %
HGB BLD-MCNC: 10 G/DL
IMM GRANULOCYTES # BLD AUTO: 0.14 K/UL
MCH RBC QN AUTO: 26.8 PG
MCHC RBC AUTO-ENTMCNC: 29.8 G/DL
MCV RBC AUTO: 90 FL
NEUTROPHILS # BLD AUTO: 7.3 K/UL
PLATELET # BLD AUTO: 229 K/UL
PMV BLD AUTO: 10 FL
POTASSIUM SERPL-SCNC: 4.2 MMOL/L
PROT SERPL-MCNC: 5.2 G/DL
RBC # BLD AUTO: 3.73 M/UL
SODIUM SERPL-SCNC: 145 MMOL/L
WBC # BLD AUTO: 10.02 K/UL

## 2018-05-15 PROCEDURE — 85027 COMPLETE CBC AUTOMATED: CPT

## 2018-05-15 PROCEDURE — 63600175 PHARM REV CODE 636 W HCPCS: Mod: JG | Performed by: INTERNAL MEDICINE

## 2018-05-15 PROCEDURE — 96372 THER/PROPH/DIAG INJ SC/IM: CPT

## 2018-05-15 PROCEDURE — 99999 PR PBB SHADOW E&M-EST. PATIENT-LVL V: CPT | Mod: PBBFAC,,, | Performed by: INTERNAL MEDICINE

## 2018-05-15 PROCEDURE — 99215 OFFICE O/P EST HI 40 MIN: CPT | Mod: S$GLB,,, | Performed by: INTERNAL MEDICINE

## 2018-05-15 PROCEDURE — 3077F SYST BP >= 140 MM HG: CPT | Mod: CPTII,S$GLB,, | Performed by: INTERNAL MEDICINE

## 2018-05-15 PROCEDURE — 99499 UNLISTED E&M SERVICE: CPT | Mod: S$GLB,,, | Performed by: INTERNAL MEDICINE

## 2018-05-15 PROCEDURE — 80053 COMPREHEN METABOLIC PANEL: CPT

## 2018-05-15 PROCEDURE — 3078F DIAST BP <80 MM HG: CPT | Mod: CPTII,S$GLB,, | Performed by: INTERNAL MEDICINE

## 2018-05-15 PROCEDURE — 36415 COLL VENOUS BLD VENIPUNCTURE: CPT

## 2018-05-15 RX ADMIN — DENOSUMAB 120 MG: 120 INJECTION SUBCUTANEOUS at 10:05

## 2018-05-15 NOTE — NURSING
Pt arrived for xgeva following MD appt. Labs reviewed.  Pt tolerated injection SQ to LLA.  Discharged to home with appt calendar.

## 2018-05-15 NOTE — Clinical Note
Schedule MRI brain after Cardiology appointment on 5/24   Schedule CBC,CMP, CT chest, abdomen without contrast and bone scan and see me in first week of June 2018

## 2018-05-15 NOTE — PROGRESS NOTES
"Subjective:       Patient ID: Naty St is a 72 y.o. female.    Chief Complaint: Lung Cancer and Fatigue  Oncologic History:  Ms. Naty St was admitted to the hospital between 01/25/2016 and 01/28/2016. She has a history of pancreatic cancer 17 years ago, breast cancer and meningioma, presented to the hospital complaining of loss of consciousness. The patient apparently was in her normal state of health and she stood up to go to the bathroom and fell to the floor. The patient's daughter helped the mother up in the bathroom and noted that her mother's upper extremities were shaking and eye rolling. No reports of bowel or bladder incontinence, tongue biting or rolling. The second episode lasted about four minutes and she was extremely lethargic following that. Apparently, the patient had a meningioma resection done in the past; however, recently, underwent neurosurgical resection with Dr. 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    Additional immunohistochemical stains were performed which revealed the tumor cells to be are positive for TTF1 and negative for ER and GCDFP. The morphology and TTF1 positivity are most consistent with lung primary.    Of note, imaging scan at the end of January 2016 revealed a mass in the lung at 2 cm in the medial aspect of the apical segment of the right upper lobe abutting the mediastinum at the level of the azygous vein and abutting and possibly encasing the segmental bronchi and vessels of the apical segment of the right upper lobe and no pleural fluid was present. Also, there is an enlarged right paratracheal lymph node. No evidence of any metastatic disease at the pancreatic site with postoperative changes post Whipple disease  Her PET Scan from 2/15/16 reveal "Hypermetabolic mass in the right lung apex consistent with a primary malignancy. " "Hypermetabolic mediastinal lymph nodes consistent with metastatic disease. Right sacral hypermetabolic lesion consistent with metastatic disease, noting additional mildly sclerotic lesions in multiple vertebral bodies which do not demonstrate abnormal hypermetabolism  She underwent IR bone biopsy which revealed metastatic adenocarcinoma of lung origin. 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  She has completed focal RT to brain lesions in March 2016.    PET scan from 6/6/16 shows "Dramatic almost complete response to therapy."  Lovenox started after US lower ext 6/7/16 shows Acute complete occlusion of one of the left posterior tibial vein.Remote partial thrombus of the proximal left superficial femoral vein.  She is on Tarceva.   12/6/16 PET scan reveals "Stable right upper lobe lesion and right hilar lymph node. No new lesions identified. Trace left pleural effusion".  1/27/17 Renal u/s "Medical renal disease. Nonobstructive right nephrolithiasis"  1/31/17 PET - "Right upper lobe nodule and right hilar lymph node similar and very low grade activity.  There is no definite evidence of recurrence."   11/9/17 PET "In this patient with history of lung cancer, there is interval increase in size and hypermetabolism of a right upper lobe lung lesion concerning for recurrent disease. Additionally, there is interval appearance of a hypermetabolic paratracheal lymph node suspicious for metastatic disease"                 She progressed on Tarceva She underwent EBUS on 12/5/17. Pathology revealed adenocarcinoma but T790m could not be done as quantity was not insufficient. Her PET scan from 1/30/18 revealed The patient's previously identified abnormal lesions is slightly greater uptake of FDG on today's study compared with prior exam. These findings are concerning for progression of disease"      She was hospitalized between 4/9/18-4/16/18. Her hospital course was as follows "Patient was admitted to hemonc service on 4/9 " "with acute hypoxic respiratory failure. She was requiring 4 L of nc on admission. She was started on moxi for CAP. She received one dose of ceftriaxone in the ED. On 2nd day of admission she spiked a fever. Her Hb was ~7 g/dl so she was transfused 1 unit of PRBCs. Over night she developed worsening of her symptoms with increased O2 requirements to 15 L HFNC. Her CXR showed worsening congestion. There was a concern of TRALI vs TACO. New 2D echo with diastolic dysfunction. She was given IV lasix pushes as needed and was started on dexamethasone.  Her abx was escalated to vanc and cefepime. She improved with diuresis and steroids. Pulmonary were consulted. Her O2 requirements continued to improve. On 4/15 she was switched to Augmentin. PT recommended discharging her to SNF but the patient refused and she wanted to go home with home health. She qualified for home oxygen so she was discharged on 3 L nc while at rest and 6 while active. Augmentin and dexamethasone were discontinued on discharge"    Jose was readmitted from 4/27 to 5/3/18 with who presented to the ED with a complaint of fatigue and worsening DELA CRUZ. Pt diagnosed PNA 4/9 and was discharged on 4/16 on 3L oxygen. She was initially placed on cefepime for 3 days followed by an add'l 2 days of Augmentin. Pulm was consulted with assistance as her oxygen requirements were increasing. She was placed on oral steroids, then trailed on 80mg TID solumedrol for 2 days and will be discharged on a prednisone taper. She was also diuresed with 2 days of 40mg IV lasix with appropriate UOP resulting, improvement on repeat CXR. She was medically stable and appropriate for DC home with home health on 1L continuous O2. She will be seen for close f/u and may be able to come off oxygen at that time.   She is now on tapering dose of prednisone and is now on 20 mg bid. She is successfully able to taper without worsening of shortness of breath. She is accompanied by her son    Review " of Systems   Constitutional: Negative for appetite change, fatigue and unexpected weight change.   HENT: Negative for mouth sores.    Eyes: Negative for visual disturbance.   Respiratory: Negative for cough and shortness of breath.    Cardiovascular: Negative for chest pain.   Gastrointestinal: Negative for abdominal pain and diarrhea.   Genitourinary: Negative for frequency.   Musculoskeletal: Negative for back pain.   Skin: Negative for rash.   Neurological: Negative for headaches.   Hematological: Negative for adenopathy.   Psychiatric/Behavioral: The patient is not nervous/anxious.    All other systems reviewed and are negative.      PMFSH: all information reviewed and updated as relevant to today's visit  Objective:      Physical Exam   Constitutional: She is oriented to person, place, and time. She appears well-developed and well-nourished.   HENT:   Mouth/Throat: No oropharyngeal exudate.   Cardiovascular: Normal rate and normal heart sounds.    Pulmonary/Chest: Effort normal and breath sounds normal. She has no wheezes.   Abdominal: Soft. Bowel sounds are normal. There is no tenderness.   Musculoskeletal: She exhibits no edema or tenderness.   Lymphadenopathy:     She has no cervical adenopathy.   Neurological: She is alert and oriented to person, place, and time. Coordination normal.   Skin: Skin is warm and dry. No rash noted.   Psychiatric: She has a normal mood and affect. Judgment and thought content normal.   Vitals reviewed.      LABS:  WBC   Date Value Ref Range Status   05/15/2018 10.02 3.90 - 12.70 K/uL Final   05/15/2018 9.84 3.90 - 12.70 K/uL Final     Hemoglobin   Date Value Ref Range Status   05/15/2018 10.0 (L) 12.0 - 16.0 g/dL Final   05/15/2018 10.0 (L) 12.0 - 16.0 g/dL Final     POC Hematocrit   Date Value Ref Range Status   01/12/2016 25 (L) 36 - 54 %PCV Final     Hematocrit   Date Value Ref Range Status   05/15/2018 33.6 (L) 37.0 - 48.5 % Final   05/15/2018 33.4 (L) 37.0 - 48.5 % Final      Platelets   Date Value Ref Range Status   05/15/2018 229 150 - 350 K/uL Final   05/15/2018 245 150 - 350 K/uL Final     Gran # (ANC)   Date Value Ref Range Status   05/15/2018 7.3 1.8 - 7.7 K/uL Final     Comment:     The ANC is based on a white cell differential from an   automated cell counter. It has not been microscopically   reviewed for the presence of abnormal cells. Clinical   correlation is required.     05/15/2018 7.2 1.8 - 7.7 K/uL Final     Gran%   Date Value Ref Range Status   05/15/2018 73.0 38.0 - 73.0 % Final       Chemistry        Component Value Date/Time     05/15/2018 0830     05/15/2018 0830    K 4.2 05/15/2018 0830    K 4.2 05/15/2018 0830     (H) 05/15/2018 0830     (H) 05/15/2018 0830    CO2 22 (L) 05/15/2018 0830    CO2 24 05/15/2018 0830    BUN 21 05/15/2018 0830    BUN 21 05/15/2018 0830    CREATININE 1.2 05/15/2018 0830    CREATININE 1.2 05/15/2018 0830     05/15/2018 0830     05/15/2018 0830        Component Value Date/Time    CALCIUM 8.6 (L) 05/15/2018 0830    CALCIUM 8.6 (L) 05/15/2018 0830    ALKPHOS 54 (L) 05/15/2018 0830    AST 30 05/15/2018 0830    ALT 54 (H) 05/15/2018 0830    BILITOT 0.5 05/15/2018 0830    ESTGFRAFRICA 51.8 (A) 05/15/2018 0830    ESTGFRAFRICA 51.8 (A) 05/15/2018 0830    EGFRNONAA 44.9 (A) 05/15/2018 0830    EGFRNONAA 44.9 (A) 05/15/2018 0830          Assessment:       1. Malignant neoplasm of lower lobe of right lung    2. Brain metastases    3. Secondary cancer of bone        Plan:        1,2,3. Hold Tagrisso. She has 2 more weeks of prednisone taper left. She will complete that and I will plan on restaging CT scans and bone scan. She is also due for MRI brain. She will also receive Xgeva today.  Will plan on restarting Tarceva based on scans    Above care plan was discussed with patient and accompanying son and all questions were addressed to their satisfaction

## 2018-05-16 ENCOUNTER — OUTPATIENT CASE MANAGEMENT (OUTPATIENT)
Dept: ADMINISTRATIVE | Facility: OTHER | Age: 73
End: 2018-05-16

## 2018-05-16 DIAGNOSIS — C34.31 MALIGNANT NEOPLASM OF LOWER LOBE OF RIGHT LUNG: Primary | ICD-10-CM

## 2018-05-24 ENCOUNTER — OFFICE VISIT (OUTPATIENT)
Dept: NEPHROLOGY | Facility: CLINIC | Age: 73
End: 2018-05-24
Payer: MEDICARE

## 2018-05-24 ENCOUNTER — HOSPITAL ENCOUNTER (OUTPATIENT)
Dept: RADIOLOGY | Facility: HOSPITAL | Age: 73
Discharge: HOME | End: 2018-05-24
Attending: INTERNAL MEDICINE
Payer: MEDICARE

## 2018-05-24 ENCOUNTER — OFFICE VISIT (OUTPATIENT)
Dept: CARDIOLOGY | Facility: CLINIC | Age: 73
End: 2018-05-24
Payer: MEDICARE

## 2018-05-24 VITALS
HEART RATE: 72 BPM | BODY MASS INDEX: 23.63 KG/M2 | SYSTOLIC BLOOD PRESSURE: 120 MMHG | DIASTOLIC BLOOD PRESSURE: 70 MMHG | WEIGHT: 147 LBS | HEIGHT: 66 IN | OXYGEN SATURATION: 97 %

## 2018-05-24 VITALS
DIASTOLIC BLOOD PRESSURE: 68 MMHG | SYSTOLIC BLOOD PRESSURE: 148 MMHG | BODY MASS INDEX: 23.17 KG/M2 | HEIGHT: 66 IN | WEIGHT: 144.19 LBS | OXYGEN SATURATION: 100 % | HEART RATE: 76 BPM

## 2018-05-24 DIAGNOSIS — D64.9 ANEMIA, UNSPECIFIED TYPE: ICD-10-CM

## 2018-05-24 DIAGNOSIS — N18.30 CKD (CHRONIC KIDNEY DISEASE) STAGE 3, GFR 30-59 ML/MIN: Primary | ICD-10-CM

## 2018-05-24 DIAGNOSIS — I42.0 DILATED CARDIOMYOPATHY: Primary | ICD-10-CM

## 2018-05-24 DIAGNOSIS — E83.42 HYPOMAGNESEMIA: ICD-10-CM

## 2018-05-24 DIAGNOSIS — D63.1 ANEMIA OF CHRONIC RENAL FAILURE, STAGE 3 (MODERATE): ICD-10-CM

## 2018-05-24 DIAGNOSIS — N18.30 ANEMIA OF CHRONIC RENAL FAILURE, STAGE 3 (MODERATE): ICD-10-CM

## 2018-05-24 DIAGNOSIS — I63.9 RIGHT-SIDED CEREBROVASCULAR ACCIDENT (CVA): ICD-10-CM

## 2018-05-24 DIAGNOSIS — I10 ESSENTIAL HYPERTENSION: ICD-10-CM

## 2018-05-24 DIAGNOSIS — C34.90 MALIGNANT NEOPLASM OF LUNG, UNSPECIFIED LATERALITY, UNSPECIFIED PART OF LUNG: ICD-10-CM

## 2018-05-24 DIAGNOSIS — N18.30 CKD (CHRONIC KIDNEY DISEASE) STAGE 3, GFR 30-59 ML/MIN: ICD-10-CM

## 2018-05-24 DIAGNOSIS — E78.5 DYSLIPIDEMIA: ICD-10-CM

## 2018-05-24 DIAGNOSIS — E55.9 VITAMIN D DEFICIENCY: ICD-10-CM

## 2018-05-24 PROCEDURE — 99499 UNLISTED E&M SERVICE: CPT | Mod: S$GLB,,, | Performed by: INTERNAL MEDICINE

## 2018-05-24 PROCEDURE — A9585 GADOBUTROL INJECTION: HCPCS | Performed by: INTERNAL MEDICINE

## 2018-05-24 PROCEDURE — 3074F SYST BP LT 130 MM HG: CPT | Mod: CPTII,S$GLB,, | Performed by: INTERNAL MEDICINE

## 2018-05-24 PROCEDURE — 25500020 PHARM REV CODE 255: Performed by: INTERNAL MEDICINE

## 2018-05-24 PROCEDURE — 70553 MRI BRAIN STEM W/O & W/DYE: CPT | Mod: TC

## 2018-05-24 PROCEDURE — 70553 MRI BRAIN STEM W/O & W/DYE: CPT | Mod: 26,,, | Performed by: RADIOLOGY

## 2018-05-24 PROCEDURE — 3078F DIAST BP <80 MM HG: CPT | Mod: CPTII,S$GLB,, | Performed by: INTERNAL MEDICINE

## 2018-05-24 PROCEDURE — 99213 OFFICE O/P EST LOW 20 MIN: CPT | Mod: S$GLB,,, | Performed by: INTERNAL MEDICINE

## 2018-05-24 PROCEDURE — 99999 PR PBB SHADOW E&M-EST. PATIENT-LVL IV: CPT | Mod: PBBFAC,,, | Performed by: INTERNAL MEDICINE

## 2018-05-24 PROCEDURE — 99214 OFFICE O/P EST MOD 30 MIN: CPT | Mod: S$GLB,,, | Performed by: INTERNAL MEDICINE

## 2018-05-24 PROCEDURE — 99999 PR PBB SHADOW E&M-EST. PATIENT-LVL V: CPT | Mod: PBBFAC,,, | Performed by: INTERNAL MEDICINE

## 2018-05-24 RX ORDER — GADOBUTROL 604.72 MG/ML
7 INJECTION INTRAVENOUS
Status: COMPLETED | OUTPATIENT
Start: 2018-05-24 | End: 2018-05-24

## 2018-05-24 RX ORDER — LOSARTAN POTASSIUM 25 MG/1
25 TABLET ORAL 2 TIMES DAILY
Qty: 180 TABLET | Refills: 3 | Status: SHIPPED | OUTPATIENT
Start: 2018-05-24 | End: 2018-06-29 | Stop reason: SDUPTHER

## 2018-05-24 RX ADMIN — GADOBUTROL 7 ML: 604.72 INJECTION INTRAVENOUS at 03:05

## 2018-05-24 NOTE — LETTER
May 24, 2018      Maurisio Bergeron MD  [address]  [place] [address]           [facility]  [address]  [place] [facility] [address]  Phone: [phone]          Patient: Naty St   MR Number: [id number]   YOB: 1945   Date of Visit: 5/24/2018       Dear Dr. Maurisio Bergeron:    Thank you for referring Naty St to me for evaluation. Attached you will find relevant portions of my assessment and plan of care.    If you have questions, please do not hesitate to call me. I look forward to following Naty St along with you.    Sincerely,    Minh Mcelroy MD    Enclosure  CC:  No Recipients    If you would like to receive this communication electronically, please contact [email or web address] or [phone] to request more information on [facility] access.    For providers and/or their staff who would like to refer a patient to [facility], please contact us through our one-stop-shop provider referral line, [facility], at [phone].    If you feel you have received this communication in error or would no longer like to receive these types of communications, please e-mail [email or web address]

## 2018-05-24 NOTE — PROGRESS NOTES
Subjective:   Patient ID:  Naty St is a 72 y.o. female who presents for evaluation of Acute on chronic respiratory failure with hypoxia; Shortness of Breath; Congestive Heart Failure; and Tachycardia      HPI:   The patient presents for evaluation of a mildly depressed LV systolic function. She has had diastolic dysfunction noted in the past but echos done during a recent hospitalization for respiratory failure revealed her EF to be borderline normal to mildly depressed. Last check 6 weeks ago suggested an inferior wall motion abnormality. She had no c/o chest pain, troponin levels were flat and there was no ECG changes. Since discharge she has had no further Sob. She is being treated for metastatic lung cancer. Naty St has hypertension with mild elevation this visit but 120/ at an earlier Clinic appointment. She has had a DVT and is on Eliquis. .    Review of Systems   Constitution: Positive for weakness. Negative for malaise/fatigue, weight gain and weight loss.   Eyes: Negative for blurred vision.   Cardiovascular: Negative for chest pain, claudication, cyanosis, dyspnea on exertion, irregular heartbeat, leg swelling, near-syncope, orthopnea, palpitations, paroxysmal nocturnal dyspnea and syncope.   Respiratory: Negative for cough, shortness of breath and wheezing.    Musculoskeletal: Negative for falls and myalgias.   Gastrointestinal: Negative for abdominal pain, heartburn, nausea and vomiting.   Genitourinary: Negative for nocturia.   Neurological: Negative for brief paralysis, dizziness, focal weakness, headaches, numbness and paresthesias.        CVA in October 2017   Psychiatric/Behavioral: Negative for altered mental status.       Current Outpatient Prescriptions   Medication Sig    amitriptyline (ELAVIL) 50 MG tablet Take 1 tablet (50 mg total) by mouth every evening.    apixaban (ELIQUIS) 5 mg Tab TAKE 1 TABLET BY MOUTH TWO TIMES A DAY    atorvastatin (LIPITOR) 40 MG  tablet Take 1 tablet (40 mg total) by mouth once daily.    clindamycin phosphate 1% (CLINDAGEL) 1 % gel Apply topically 2 (two) times daily.    CREON CpDR TAKE ONE CAPSULE BY MOUTH THREE TIMES A DAY WITH MEALS    denosumab (XGEVA) 120 mg/1.7 mL (70 mg/mL) Soln Inject 120 mg into the skin every 28 days.    dronabinol (MARINOL) 5 MG capsule Take 1 capsule (5 mg total) by mouth 2 (two) times daily before meals.    levETIRAcetam (KEPPRA) 750 MG Tab Take 1 tablet (750 mg total) by mouth 2 (two) times daily.    LORazepam (ATIVAN) 1 MG tablet Take 1 tablet (1 mg total) by mouth 2 (two) times daily.    meclizine (ANTIVERT) 12.5 mg tablet Take 1-2 tablets (12.5-25 mg total) by mouth 3 (three) times daily as needed for Dizziness.    metoprolol succinate (TOPROL-XL) 50 MG 24 hr tablet Take 1 tablet (50 mg total) by mouth once daily. Hold until your appointment with your PCP. HR and BP has been normal off this medications.    mirabegron 50 mg Tb24 Take 1 tablet (50 mg total) by mouth once daily.    multivitamin (THERAGRAN) per tablet Take 1 tablet by mouth once daily.    NYSTATIN (DUKE'S SOLUTION) Take 10 mLs by mouth 4 (four) times daily as needed (mouth sores/pain).    predniSONE (DELTASONE) 10 MG tablet Take 4 tablets (40 mg total) by mouth once daily. x7 days Then 2 tabs daily x7 days Then 1 tab daily x7 days. Then 1/2 tab daily x4 days    sodium bicarbonate 650 MG tablet Take 1 tablet (650 mg total) by mouth 3 (three) times daily. Increase dose to 2 60 mg tabs by mouth three times daily.    traMADol (ULTRAM) 50 mg tablet Take 1 tablet (50 mg total) by mouth every 6 (six) hours as needed for Pain.    losartan (COZAAR) 25 MG tablet Take 1 tablet (25 mg total) by mouth 2 (two) times daily.     No current facility-administered medications for this visit.      Facility-Administered Medications Ordered in Other Visits   Medication    denosumab (XGEVA) solution 120 mg     Objective:   Physical Exam  "  Constitutional: She is oriented to person, place, and time. She appears well-developed. No distress.   BP (!) 148/68 (BP Location: Right arm, Patient Position: Sitting, BP Method: Medium (Automatic))   Pulse 76   Ht 5' 6" (1.676 m)   Wt 65.4 kg (144 lb 2.9 oz)   LMP  (LMP Unknown)   SpO2 100%   BMI 23.27 kg/m²    HENT:   Head: Normocephalic.   Eyes: Conjunctivae are normal. Pupils are equal, round, and reactive to light. No scleral icterus.   Neck: Neck supple. No JVD present. No thyromegaly present.   Cardiovascular: Normal rate, regular rhythm, normal heart sounds and intact distal pulses.  PMI is not displaced.  Exam reveals no gallop and no friction rub.    No murmur heard.  1+ bilateral pedal edema  No JVD   Pulmonary/Chest: Effort normal. No respiratory distress. She has no wheezes. She has rales.   Scattered basilar coarse rales   Abdominal: Soft. She exhibits no distension. There is no splenomegaly or hepatomegaly. There is no tenderness.   Musculoskeletal: She exhibits no edema or tenderness.   In a wheelchair but able to get on the exam table.   Neurological: She is alert and oriented to person, place, and time.   Skin: Skin is warm and dry. She is not diaphoretic.   Psychiatric: She has a normal mood and affect. Her behavior is normal.       Lab Results   Component Value Date     05/15/2018     05/15/2018    K 4.2 05/15/2018    K 4.2 05/15/2018     (H) 05/15/2018     (H) 05/15/2018    CO2 22 (L) 05/15/2018    CO2 24 05/15/2018    BUN 21 05/15/2018    BUN 21 05/15/2018    CREATININE 1.2 05/15/2018    CREATININE 1.2 05/15/2018     05/15/2018     05/15/2018    HGBA1C 4.1 10/08/2017    MG 2.4 05/02/2018    AST 30 05/15/2018    ALT 54 (H) 05/15/2018    ALBUMIN 2.4 (L) 05/15/2018    ALBUMIN 2.5 (L) 05/15/2018    PROT 5.2 (L) 05/15/2018    BILITOT 0.5 05/15/2018    WBC 10.02 05/15/2018    WBC 9.84 05/15/2018    HGB 10.0 (L) 05/15/2018    HGB 10.0 (L) 05/15/2018    " HCT 33.6 (L) 05/15/2018    HCT 33.4 (L) 05/15/2018    HCT 25 (L) 01/12/2016    MCV 90 05/15/2018    MCV 90 05/15/2018     05/15/2018     05/15/2018    TSH 2.938 10/08/2017    CHOL 172 10/08/2017    HDL 76 (H) 10/08/2017    LDLCALC 83.4 10/08/2017    TRIG 63 10/08/2017       Assessment:     1. Dilated cardiomyopathy : Mild with EF 50-55% but inferior wall motion abnormality on recent follow up echo   2. Essential hypertension : Good control.   3. CKD (chronic kidney disease) stage 3, GFR 30-59 ml/min : Improved   4. Right-sided cerebrovascular accident (CVA) : In October 5. Anemia, unspecified type    6       Dyslipidemia:  on high intensity statin    Plan:     Naty was seen today for acute on chronic respiratory failure with hypoxia, shortness of breath, congestive heart failure and tachycardia.    Diagnoses and all orders for this visit:    Dilated cardiomyopathy  -     losartan (COZAAR) 25 MG tablet; Take 1 tablet (25 mg total) by mouth 2 (two) times daily.  -     Nuclear Stress Test - Pharmacologic - Interpreted by Cardiology ([facility]); Future to evaluate the significance of the regional wall motion abnormality.    Essential hypertension  -     losartan (COZAAR) 25 MG tablet; Take 1 tablet (25 mg total) by mouth 2 (two) times daily in place of amlodipine  -     Basic metabolic panel; Future; Expected date: 06/05/2018    CKD (chronic kidney disease) stage 3, GFR 30-59 ml/min    Right-sided cerebrovascular accident (CVA)    Anemia, unspecified type    Dyslipidemia:  Continue current regimen

## 2018-05-24 NOTE — PROGRESS NOTES
Subjective:       Patient ID: Naty St is a 72 y.o. Black or  female who presents for f/u evaluation of renal function . She is in  A wheelchair accompanied by  her 2 sons.  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  She denies, LUTS, SOB, dysuria, hematuria, + peripheral edema now bilaterally, + 4  Since  Increase in serum crt patient has been receiving 1L NS infusion 2 x weekly.    Interval HX:  5/2018   patient was hospitalized in April for PNA and fludi overload responded to antibiotics, cefepime and diuresis with lasix.   Serum crt 1.2 she is now on osimertinib and prednisone. For neoplasm of RRL, brain mets and bone mets   She has some leg swelling but wt is about the same over the last several months  UA neg protein in 4/2018    unclear if she is taking metoprolol and vit d I asked and explained to the patient and the son who later came into my office because he did not understand why I couldn't resolve the med list. I explained that they need to bring the bottle so that we may match and compare what meds are being taken and which are not.  This did not satisfy him. He left saying he would resolve the medicines with another doctor.    I    1/11/18:  10/08/17 had sudden onset of dizziness,   nausea, vomiting and gait difficulty, and was brought to [facility] ER where a CT   and subsequent MRI showed a right cerebellar infarct.  not felt to be related to metastatic disease   Receiving xevga  hbg 8.6 ferritn 13, sat 10  Calcium 8.9, alb 2.6  Taking mag once daily    11/2107 pet scan:  In this patient with history of lung  cancer, there is interval increase in size and hypermetabolism of a right upper lobe lung lesion concerning for recurrent disease. Additionally, there is interval appearance of a hypermetabolic paratracheal lymph node suspicious for metastatic disease.      Serum creatinine stable at 1.6-1.7  Renal US  R lower pole non-obstructing stone, otherwise no acute findings   Serologies wnl  cpk wnl  Not on oral magnesium checked was 1.6 serum potassium is 3.4 currently  UPRCT none found on this exam  24 hr stone profile demonstrated hyperoxaluria 85 mg/dl and hypocitraturia, she claims she is following a low oxalate diet and not eating peanut butter,   She occassionally has watery diarrhea, pedal edema increases with dependency, Her bp at home is about 120/70.  No LUTs dysuria, fevers or SOB.But she is drinking less,and has had yellow stools for at least a month.  Now taking sodium bicarbonate serum CO2 23      Review of Systems   Constitutional: Positive for fatigue and unexpected weight change. Negative for appetite change, chills and fever.        30 lb weight loss since 9/2016   HENT: Negative for congestion, facial swelling, hearing loss, nosebleeds and trouble swallowing.    Eyes: Negative for pain, discharge, redness and visual disturbance.   Respiratory: Negative for cough, chest tightness, shortness of breath and wheezing.    Cardiovascular: Positive for leg swelling. Negative for chest pain and palpitations.        Trace to +1 ankle edema, improved   Gastrointestinal: Negative for abdominal pain, constipation, diarrhea, nausea and vomiting.   Endocrine: Negative for cold intolerance, heat intolerance and polydipsia.   Genitourinary: Negative for decreased urine volume, difficulty urinating, dysuria, flank pain, hematuria and urgency.   Musculoskeletal: Negative for arthralgias, back pain, joint swelling and myalgias.   Skin: Negative for color change, pallor, rash and wound.   Neurological: Negative for dizziness,  "tremors, seizures, syncope, speech difficulty, weakness and headaches.   Hematological: Negative for adenopathy. Does not bruise/bleed easily.   Psychiatric/Behavioral: Negative for agitation, behavioral problems, dysphoric mood, self-injury and sleep disturbance.       Objective:     Blood pressure 120/70, pulse 72, height 5' 6" (1.676 m), weight 66.7 kg (147 lb), SpO2 97 %.      Physical Exam   Constitutional: She is oriented to person, place, and time. She appears well-developed and well-nourished. No distress.   Chronically ill, swallow complexion, NAD, appearing more tired now   HENT:   Head: Normocephalic and atraumatic.   Mouth/Throat: No oropharyngeal exudate.   Eyes: Conjunctivae and EOM are normal. Pupils are equal, round, and reactive to light. Right eye exhibits no discharge. Left eye exhibits no discharge. No scleral icterus.   Neck: Normal range of motion. Neck supple. No JVD present. No tracheal deviation present. No thyromegaly present.   Cardiovascular: Normal rate, regular rhythm and normal heart sounds.  Exam reveals no gallop.    No murmur heard.  2-3 +bilateral nonpitting edema, unable to assess pulses   Pulmonary/Chest: No stridor. No respiratory distress. She has no wheezes. She has no rales. She exhibits no tenderness.   Poor effort, crackles r base    Abdominal: Soft. Bowel sounds are normal. She exhibits no distension and no mass. There is no tenderness. There is no rebound and no guarding. No hernia.   Bladder not palpable, just above umbilicus 0.5 cm firm mobile mass that is more palpable when supine but not when sitting upright.   Musculoskeletal: She exhibits no edema or tenderness.   Lymphadenopathy:     She has no cervical adenopathy.   Neurological: She is alert and oriented to person, place, and time. She exhibits normal muscle tone.   Skin: Skin is warm and dry. No rash noted. She is not diaphoretic. No erythema.   Psychiatric: She has a normal mood and affect. Judgment and thought " content normal.   Nursing note and vitals reviewed.      Assessment:       1. CKD (chronic kidney disease) stage 3, GFR 30-59 ml/min    2. Hypomagnesemia    3. Vitamin D deficiency    4. 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     STEFAN superimposed on CKD. STEFAN may be related to use of Epidermal GF inhibitor, dehydration.  Now resolved   normal serologies, UPRCT with 100 to 250 mg protein,      Diarrhea,  resolved    Hypomagnesemia:  Likely related to Tarceva.  Patient is taking daily supplement  Will check level today.  Serum magnesium should be followed regularly while on tarceva.  Continue daily supplement    CKD stage 3 improved stable,likely related to longstanding  HTN, nephrosclerosis with smoking history, possible reduced renal mass from scarring/ obstruction with nephrolithiasis and UTIs,  Stable function now.    Peripheral edema, I have explained several times duirng the visit to weigh herself each morning  daily and call if wt increases more than 5 lbs or worsening peripheral edema as she may require a loop diuretic lasix 20 mg daily.     Nephrolithiasis: hyperoxaluria, low oxalate diet, calcium citrate ordered, and d/c calcium carbonatewoul encourage Po fluid  2 L daily     HTN: stable        Lab Results   Component Value Date    CREATININE 1.2 05/15/2018    CREATININE 1.2 05/15/2018     Protein Creatinine Ratios: continue to follow  Prot/Creat Ratio, Ur   Date Value Ref Range Status   12/28/2017 Unable to calculate 0.00 - 0.20 Final   07/03/2017  Unable to calculate 0.00 - 0.20 Final   02/13/2017 0.24 (H) 0.00 - 0.20 Final       2. Metabolic Acidosis:  sodium bicarb 650 mg bid inc to tid   Lab Results   Component Value Date     05/15/2018     05/15/2018    K 4.2 05/15/2018    K 4.2 05/15/2018    CO2 22 (L) 05/15/2018    CO2 24 05/15/2018         3. Renal osteodystrophy: PTH elevated, vitamin D.19  Will add    25-hydroxy vitamin D 50,000  thousand units weekly ×8 weeks and then once monthly thereafter  Lab Results   Component Value Date    .0 (H) 12/28/2017    CALCIUM 8.6 (L) 05/15/2018    CALCIUM 8.6 (L) 05/15/2018    CAION 1.06 11/10/2017    PHOS 3.4 05/15/2018       4. Anemia:  As per hematology we'll order iron level  Lab Results   Component Value Date    HGB 10.0 (L) 05/15/2018    HGB 10.0 (L) 05/15/2018        5. HTN:       Medication: no change      Monitor BP as directed in instructions      7. Weight: Weight: 66.7 kg (147 lb). she states that her daily weights   From reading flowsheets 30 lb weight loss since 9/2016  Hold on lasix at this time, despite peripheral edema she is  without respiratory symptoms.  Wt Readings from Last 3 Encounters:   05/24/18 66.7 kg (147 lb)   05/15/18 67.6 kg (149 lb 0.5 oz)   05/02/18 61.8 kg (136 lb 3.9 oz)       8. Lipid management:   Lab Results   Component Value Date    LDLCALC 83.4 10/08/2017         9. Diet:  Education/referral: consider Nepro or BOOST,  benaprotein      Sodium: 2 gm    Potassium:      Phosphorus:      Protein:      Fluid:       10. ESRD planing: not indicated at this time       Education:       Access:    DVT:on eliquis 5 mg  If gfr below 30 would consider warfarin instead  13.  Please avoid or minimize all NSAIDS (ibuprofen, motrin, aleve, indocin, naprosyn) to minimize the risk to your kidneys.      14. Follow up in :3 mo, labs prior. Please bring in med bottles and review with MA

## 2018-05-25 ENCOUNTER — TELEPHONE (OUTPATIENT)
Dept: HEMATOLOGY/ONCOLOGY | Facility: CLINIC | Age: 73
End: 2018-05-25

## 2018-05-25 LAB
CREAT SERPL-MCNC: 1.1 MG/DL (ref 0.5–1.4)
SAMPLE: NORMAL

## 2018-05-25 NOTE — TELEPHONE ENCOUNTER
----- Message from Karrie Vazquez MD sent at 5/24/2018  4:53 PM CDT -----  MRI brain with no new findings

## 2018-05-29 ENCOUNTER — TELEPHONE (OUTPATIENT)
Dept: CARDIOLOGY | Facility: CLINIC | Age: 73
End: 2018-05-29

## 2018-05-29 DIAGNOSIS — F32.A DEPRESSION, UNSPECIFIED DEPRESSION TYPE: ICD-10-CM

## 2018-05-29 RX ORDER — AMITRIPTYLINE HYDROCHLORIDE 50 MG/1
50 TABLET, FILM COATED ORAL NIGHTLY
Qty: 30 TABLET | Refills: 3 | Status: SHIPPED | OUTPATIENT
Start: 2018-05-29 | End: 2018-09-27 | Stop reason: SDUPTHER

## 2018-05-29 NOTE — TELEPHONE ENCOUNTER
----- Message from Michelle Sanchez MA sent at 5/29/2018  1:37 PM CDT -----  Contact: son called  Please call the patient son Basil he need to talk to you about his mother test that schedule on 6-6-2018 . Please call [phone] . Thank you.

## 2018-05-30 DIAGNOSIS — C34.31 MALIGNANT NEOPLASM OF LOWER LOBE OF RIGHT LUNG: ICD-10-CM

## 2018-05-30 DIAGNOSIS — R63.0 ANOREXIA: ICD-10-CM

## 2018-05-30 RX ORDER — DRONABINOL 5 MG/1
5 CAPSULE ORAL
Qty: 60 CAPSULE | Refills: 3 | Status: SHIPPED | OUTPATIENT
Start: 2018-05-30 | End: 2019-07-26 | Stop reason: SDUPTHER

## 2018-06-04 ENCOUNTER — HOSPITAL ENCOUNTER (OUTPATIENT)
Dept: RADIOLOGY | Facility: HOSPITAL | Age: 73
Discharge: HOME | End: 2018-06-04
Attending: INTERNAL MEDICINE
Payer: MEDICARE

## 2018-06-04 DIAGNOSIS — C34.31 MALIGNANT NEOPLASM OF LOWER LOBE OF RIGHT LUNG: ICD-10-CM

## 2018-06-04 PROCEDURE — 71250 CT THORAX DX C-: CPT | Mod: 26,,, | Performed by: RADIOLOGY

## 2018-06-04 PROCEDURE — 71250 CT THORAX DX C-: CPT | Mod: TC

## 2018-06-04 PROCEDURE — A9503 TC99M MEDRONATE: HCPCS

## 2018-06-04 PROCEDURE — 74150 CT ABDOMEN W/O CONTRAST: CPT | Mod: TC

## 2018-06-04 PROCEDURE — 78306 BONE IMAGING WHOLE BODY: CPT | Mod: 26,,, | Performed by: RADIOLOGY

## 2018-06-04 PROCEDURE — 74150 CT ABDOMEN W/O CONTRAST: CPT | Mod: 26,,, | Performed by: RADIOLOGY

## 2018-06-05 ENCOUNTER — LAB VISIT (OUTPATIENT)
Dept: LAB | Facility: HOSPITAL | Age: 73
End: 2018-06-05
Attending: INTERNAL MEDICINE
Payer: MEDICARE

## 2018-06-05 ENCOUNTER — OFFICE VISIT (OUTPATIENT)
Dept: HEMATOLOGY/ONCOLOGY | Facility: CLINIC | Age: 73
End: 2018-06-05
Payer: MEDICARE

## 2018-06-05 VITALS
RESPIRATION RATE: 18 BRPM | HEART RATE: 79 BPM | SYSTOLIC BLOOD PRESSURE: 154 MMHG | BODY MASS INDEX: 22.85 KG/M2 | WEIGHT: 142.19 LBS | TEMPERATURE: 98 F | DIASTOLIC BLOOD PRESSURE: 72 MMHG | OXYGEN SATURATION: 97 % | HEIGHT: 66 IN

## 2018-06-05 DIAGNOSIS — R41.0 CONFUSION: ICD-10-CM

## 2018-06-05 DIAGNOSIS — C79.51 SECONDARY CANCER OF BONE: ICD-10-CM

## 2018-06-05 DIAGNOSIS — C34.90 MALIGNANT NEOPLASM OF LUNG, UNSPECIFIED LATERALITY, UNSPECIFIED PART OF LUNG: Primary | ICD-10-CM

## 2018-06-05 DIAGNOSIS — N18.30 CKD (CHRONIC KIDNEY DISEASE) STAGE 3, GFR 30-59 ML/MIN: Primary | ICD-10-CM

## 2018-06-05 DIAGNOSIS — C34.31 MALIGNANT NEOPLASM OF LOWER LOBE OF RIGHT LUNG: Primary | ICD-10-CM

## 2018-06-05 DIAGNOSIS — I10 ESSENTIAL HYPERTENSION: ICD-10-CM

## 2018-06-05 DIAGNOSIS — C34.31 MALIGNANT NEOPLASM OF LOWER LOBE OF RIGHT LUNG: ICD-10-CM

## 2018-06-05 LAB
ALBUMIN SERPL BCP-MCNC: 2.9 G/DL
ALP SERPL-CCNC: 84 U/L
ALT SERPL W/O P-5'-P-CCNC: 22 U/L
ANION GAP SERPL CALC-SCNC: 8 MMOL/L
ANION GAP SERPL CALC-SCNC: 8 MMOL/L
AST SERPL-CCNC: 23 U/L
BILIRUB SERPL-MCNC: 0.5 MG/DL
BUN SERPL-MCNC: 36 MG/DL
BUN SERPL-MCNC: 36 MG/DL
CALCIUM SERPL-MCNC: 8.7 MG/DL
CALCIUM SERPL-MCNC: 8.7 MG/DL
CHLORIDE SERPL-SCNC: 115 MMOL/L
CHLORIDE SERPL-SCNC: 115 MMOL/L
CO2 SERPL-SCNC: 21 MMOL/L
CO2 SERPL-SCNC: 21 MMOL/L
CREAT SERPL-MCNC: 1.6 MG/DL
CREAT SERPL-MCNC: 1.6 MG/DL
ERYTHROCYTE [DISTWIDTH] IN BLOOD BY AUTOMATED COUNT: 17.3 %
EST. GFR  (AFRICAN AMERICAN): 36.6 ML/MIN/1.73 M^2
EST. GFR  (AFRICAN AMERICAN): 36.6 ML/MIN/1.73 M^2
EST. GFR  (NON AFRICAN AMERICAN): 31.7 ML/MIN/1.73 M^2
EST. GFR  (NON AFRICAN AMERICAN): 31.7 ML/MIN/1.73 M^2
GLUCOSE SERPL-MCNC: 98 MG/DL
GLUCOSE SERPL-MCNC: 98 MG/DL
HCT VFR BLD AUTO: 39.9 %
HGB BLD-MCNC: 12.3 G/DL
IMM GRANULOCYTES # BLD AUTO: 0.16 K/UL
MCH RBC QN AUTO: 27.5 PG
MCHC RBC AUTO-ENTMCNC: 30.8 G/DL
MCV RBC AUTO: 89 FL
NEUTROPHILS # BLD AUTO: 5.2 K/UL
PLATELET # BLD AUTO: 270 K/UL
PMV BLD AUTO: 9.9 FL
POTASSIUM SERPL-SCNC: 4.2 MMOL/L
POTASSIUM SERPL-SCNC: 4.2 MMOL/L
PROT SERPL-MCNC: 5.8 G/DL
RBC # BLD AUTO: 4.47 M/UL
SODIUM SERPL-SCNC: 144 MMOL/L
SODIUM SERPL-SCNC: 144 MMOL/L
WBC # BLD AUTO: 8.21 K/UL

## 2018-06-05 PROCEDURE — 99999 PR PBB SHADOW E&M-EST. PATIENT-LVL V: CPT | Mod: PBBFAC,,, | Performed by: INTERNAL MEDICINE

## 2018-06-05 PROCEDURE — 99215 OFFICE O/P EST HI 40 MIN: CPT | Mod: S$GLB,,, | Performed by: INTERNAL MEDICINE

## 2018-06-05 PROCEDURE — 3078F DIAST BP <80 MM HG: CPT | Mod: CPTII,S$GLB,, | Performed by: INTERNAL MEDICINE

## 2018-06-05 PROCEDURE — 3077F SYST BP >= 140 MM HG: CPT | Mod: CPTII,S$GLB,, | Performed by: INTERNAL MEDICINE

## 2018-06-05 PROCEDURE — 85027 COMPLETE CBC AUTOMATED: CPT

## 2018-06-05 PROCEDURE — 80053 COMPREHEN METABOLIC PANEL: CPT

## 2018-06-05 PROCEDURE — 36415 COLL VENOUS BLD VENIPUNCTURE: CPT

## 2018-06-05 RX ORDER — ERLOTINIB HYDROCHLORIDE 150 MG/1
150 TABLET, FILM COATED ORAL DAILY
Qty: 30 TABLET | Refills: 2 | Status: SHIPPED | OUTPATIENT
Start: 2018-06-05 | End: 2018-07-27 | Stop reason: SDUPTHER

## 2018-06-05 NOTE — Clinical Note
Needs LP done ASAP and sent for cytology. Also needs to see Neurology for confusion, dizziness Schedule CMP and Xgeva on 6/29/18

## 2018-06-05 NOTE — PROGRESS NOTES
"Subjective:       Patient ID: Naty St is a 72 y.o. female.    Chief Complaint: Malignant neoplasm of lower lobe of right lung  Oncologic History:  Ms. Naty St was admitted to the hospital between 01/25/2016 and 01/28/2016. She has a history of pancreatic cancer 17 years ago, breast cancer and meningioma, presented to the hospital complaining of loss of consciousness. The patient apparently was in her normal state of health and she stood up to go to the bathroom and fell to the floor. The patient's daughter helped the mother up in the bathroom and noted that her mother's upper extremities were shaking and eye rolling. No reports of bowel or bladder incontinence, tongue biting or rolling. The second episode lasted about four minutes and she was extremely lethargic following that. Apparently, the patient had a meningioma resection done in the past; however, recently, underwent neurosurgical resection with Dr. 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    Additional immunohistochemical stains were performed which revealed the tumor cells to be are positive for TTF1 and negative for ER and GCDFP. The morphology and TTF1 positivity are most consistent with lung primary.    Of note, imaging scan at the end of January 2016 revealed a mass in the lung at 2 cm in the medial aspect of the apical segment of the right upper lobe abutting the mediastinum at the level of the azygous vein and abutting and possibly encasing the segmental bronchi and vessels of the apical segment of the right upper lobe and no pleural fluid was present. Also, there is an enlarged right paratracheal lymph node. No evidence of any metastatic disease at the pancreatic site with postoperative changes post Whipple disease  Her PET Scan from 2/15/16 reveal "Hypermetabolic mass in the right lung apex consistent with a " "primary malignancy. Hypermetabolic mediastinal lymph nodes consistent with metastatic disease. Right sacral hypermetabolic lesion consistent with metastatic disease, noting additional mildly sclerotic lesions in multiple vertebral bodies which do not demonstrate abnormal hypermetabolism  She underwent IR bone biopsy which revealed metastatic adenocarcinoma of lung origin. 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  She has completed focal RT to brain lesions in March 2016.    PET scan from 6/6/16 shows "Dramatic almost complete response to therapy."  Lovenox started after US lower ext 6/7/16 shows Acute complete occlusion of one of the left posterior tibial vein.Remote partial thrombus of the proximal left superficial femoral vein.  She is on Tarceva.   12/6/16 PET scan reveals "Stable right upper lobe lesion and right hilar lymph node. No new lesions identified. Trace left pleural effusion".  1/27/17 Renal u/s "Medical renal disease. Nonobstructive right nephrolithiasis"  1/31/17 PET - "Right upper lobe nodule and right hilar lymph node similar and very low grade activity.  There is no definite evidence of recurrence."   11/9/17 PET "In this patient with history of lung cancer, there is interval increase in size and hypermetabolism of a right upper lobe lung lesion concerning for recurrent disease. Additionally, there is interval appearance of a hypermetabolic paratracheal lymph node suspicious for metastatic disease"                 She progressed on Tarceva She underwent EBUS on 12/5/17. Pathology revealed adenocarcinoma but T790m could not be done as quantity was not insufficient. Her PET scan from 1/30/18 revealed The patient's previously identified abnormal lesions is slightly greater uptake of FDG on today's study compared with prior exam. These findings are concerning for progression of disease"      She was hospitalized between 4/9/18-4/16/18. Her hospital course was as follows "Patient was admitted to " "hemonc service on 4/9 with acute hypoxic respiratory failure. She was requiring 4 L of nc on admission. She was started on moxi for CAP. She received one dose of ceftriaxone in the ED. On 2nd day of admission she spiked a fever. Her Hb was ~7 g/dl so she was transfused 1 unit of PRBCs. Over night she developed worsening of her symptoms with increased O2 requirements to 15 L HFNC. Her CXR showed worsening congestion. There was a concern of TRALI vs TACO. New 2D echo with diastolic dysfunction. She was given IV lasix pushes as needed and was started on dexamethasone.  Her abx was escalated to vanc and cefepime. She improved with diuresis and steroids. Pulmonary were consulted. Her O2 requirements continued to improve. On 4/15 she was switched to Augmentin. PT recommended discharging her to SNF but the patient refused and she wanted to go home with home health. She qualified for home oxygen so she was discharged on 3 L nc while at rest and 6 while active. Augmentin and dexamethasone were discontinued on discharge"     She was readmitted from 4/27 to 5/3/18 with who presented to the ED with a complaint of fatigue and worsening DELA CRUZ. Pt diagnosed PNA 4/9 and was discharged on 4/16 on 3L oxygen. She was initially placed on cefepime for 3 days followed by an add'l 2 days of Augmentin. Pulm was consulted with assistance as her oxygen requirements were increasing. She was placed on oral steroids, then trailed on 80mg TID solumedrol for 2 days and will be discharged on a prednisone taper. She was also diuresed with 2 days of 40mg IV lasix with appropriate UOP resulting, improvement on repeat CXR. She was medically stable and appropriate for DC home with home health on 1L continuous O2. She will be seen for close f/u and may be able to come off oxygen at that time.     [facility] is off of Tagrisso and comes in to review her restaging scans from 6/4/18 which reveal "Stable appearance of a spiculated right upper lobe pulmonary " "lesion.  Additional irregular focus superior to the lesion in the right lung apex is stable and may represent scar or additional lesion; although this was not hypermetabolic on prior PET-CT.  No new pulmonary lesions. 1.3 cm subcarinal lymph node, not hypermetabolic on prior PET-CT. Stable postsurgical changes of a Whipple procedure. Bilateral nonobstructing nephrolithiasis.  Stable sclerotic focus in the T5 vertebral body, possibly a bone island as this was not hypermetabolic on prior PET-CT. Small hiatal hernia. RECIST SUMMARY: Date of prior examination for comparison 01/30/2018 Lesion 1: Right upper lobe 1.3 cm Series 2 image 31 prior measurement 1.3 cm"  Bone scan also from 6/4/18 revealed no evidence of mets.  Son notes that patient fell 5 times yesterday, but patient notes that she only fell once.      Review of Systems   Constitutional: Negative for appetite change, fatigue and unexpected weight change.   HENT: Negative for mouth sores.    Eyes: Negative for visual disturbance.   Respiratory: Negative for cough and shortness of breath.    Cardiovascular: Negative for chest pain.   Gastrointestinal: Negative for abdominal pain and diarrhea.   Genitourinary: Negative for frequency.   Musculoskeletal: Negative for back pain.   Skin: Negative for rash.   Neurological: Negative for headaches.   Hematological: Negative for adenopathy.   Psychiatric/Behavioral: The patient is not nervous/anxious.    All other systems reviewed and are negative.      Objective:      Physical Exam   Constitutional: She is oriented to person, place, and time. She appears well-developed and well-nourished.   HENT:   Mouth/Throat: No oropharyngeal exudate.   Cardiovascular: Normal rate and normal heart sounds.    Pulmonary/Chest: Effort normal and breath sounds normal. She has no wheezes.   Abdominal: Soft. Bowel sounds are normal. There is no tenderness.   Musculoskeletal: She exhibits no edema or tenderness.   Lymphadenopathy:     She " has no cervical adenopathy.   Neurological: She is alert and oriented to person, place, and time. Coordination normal.   Skin: Skin is warm and dry. No rash noted.   Psychiatric: She has a normal mood and affect. Judgment and thought content normal.   Vitals reviewed.      LABS:  WBC   Date Value Ref Range Status   06/05/2018 8.21 3.90 - 12.70 K/uL Final     Hemoglobin   Date Value Ref Range Status   06/05/2018 12.3 12.0 - 16.0 g/dL Final     POC Hematocrit   Date Value Ref Range Status   01/12/2016 25 (L) 36 - 54 %PCV Final     Hematocrit   Date Value Ref Range Status   06/05/2018 39.9 37.0 - 48.5 % Final     Platelets   Date Value Ref Range Status   06/05/2018 270 150 - 350 K/uL Final     Gran # (ANC)   Date Value Ref Range Status   06/05/2018 5.2 1.8 - 7.7 K/uL Final     Comment:     The ANC is based on a white cell differential from an   automated cell counter. It has not been microscopically   reviewed for the presence of abnormal cells. Clinical   correlation is required.         Chemistry        Component Value Date/Time     06/05/2018 1422     06/05/2018 1422    K 4.2 06/05/2018 1422    K 4.2 06/05/2018 1422     (H) 06/05/2018 1422     (H) 06/05/2018 1422    CO2 21 (L) 06/05/2018 1422    CO2 21 (L) 06/05/2018 1422    BUN 36 (H) 06/05/2018 1422    BUN 36 (H) 06/05/2018 1422    CREATININE 1.6 (H) 06/05/2018 1422    CREATININE 1.6 (H) 06/05/2018 1422    GLU 98 06/05/2018 1422    GLU 98 06/05/2018 1422        Component Value Date/Time    CALCIUM 8.7 06/05/2018 1422    CALCIUM 8.7 06/05/2018 1422    ALKPHOS 84 06/05/2018 1422    AST 23 06/05/2018 1422    ALT 22 06/05/2018 1422    BILITOT 0.5 06/05/2018 1422    ESTGFRAFRICA 36.6 (A) 06/05/2018 1422    ESTGFRAFRICA 36.6 (A) 06/05/2018 1422    EGFRNONAA 31.7 (A) 06/05/2018 1422    EGFRNONAA 31.7 (A) 06/05/2018 1422          Assessment:       1. Malignant neoplasm of lower lobe of right lung    2. Secondary cancer of bone    3. Confusion         Plan:        1,2,3. CT scans and MRI brain with no new disease. However her symptoms are concerning so will work on LP with CSF cytology and paraneoplastic panel. Also blood for paraneoplastic panel sent today.  Will also have her see Neurology.    Above care plan was discussed with patient and accompanying son and all questions were addressed to their satisfaction

## 2018-06-06 ENCOUNTER — TELEPHONE (OUTPATIENT)
Dept: HEMATOLOGY/ONCOLOGY | Facility: CLINIC | Age: 73
End: 2018-06-06

## 2018-06-06 ENCOUNTER — DOCUMENTATION ONLY (OUTPATIENT)
Dept: HEMATOLOGY/ONCOLOGY | Facility: CLINIC | Age: 73
End: 2018-06-06

## 2018-06-06 ENCOUNTER — OFFICE VISIT (OUTPATIENT)
Dept: NEUROLOGY | Facility: CLINIC | Age: 73
End: 2018-06-06
Payer: MEDICARE

## 2018-06-06 ENCOUNTER — CLINICAL SUPPORT (OUTPATIENT)
Dept: CARDIOLOGY | Facility: CLINIC | Age: 73
End: 2018-06-06
Attending: INTERNAL MEDICINE
Payer: MEDICARE

## 2018-06-06 VITALS — HEIGHT: 66 IN | WEIGHT: 141 LBS | BODY MASS INDEX: 22.66 KG/M2

## 2018-06-06 DIAGNOSIS — R53.1 GENERALIZED WEAKNESS: ICD-10-CM

## 2018-06-06 DIAGNOSIS — I63.9 CEREBELLAR INFARCT: ICD-10-CM

## 2018-06-06 DIAGNOSIS — R41.3 MEMORY DIFFICULTY: ICD-10-CM

## 2018-06-06 DIAGNOSIS — C34.90 MALIGNANT NEOPLASM OF LUNG, UNSPECIFIED LATERALITY, UNSPECIFIED PART OF LUNG: Primary | ICD-10-CM

## 2018-06-06 DIAGNOSIS — I42.0 DILATED CARDIOMYOPATHY: ICD-10-CM

## 2018-06-06 DIAGNOSIS — G40.909 SEIZURE DISORDER: Primary | ICD-10-CM

## 2018-06-06 LAB — DIASTOLIC DYSFUNCTION: NO

## 2018-06-06 PROCEDURE — 78452 HT MUSCLE IMAGE SPECT MULT: CPT | Mod: S$GLB,,, | Performed by: INTERNAL MEDICINE

## 2018-06-06 PROCEDURE — 99999 PR PBB SHADOW E&M-EST. PATIENT-LVL I: CPT | Mod: PBBFAC,,,

## 2018-06-06 PROCEDURE — 99499 UNLISTED E&M SERVICE: CPT | Mod: S$GLB,,, | Performed by: PSYCHIATRY & NEUROLOGY

## 2018-06-06 PROCEDURE — 93015 CV STRESS TEST SUPVJ I&R: CPT | Mod: S$GLB,,, | Performed by: INTERNAL MEDICINE

## 2018-06-06 PROCEDURE — A9502 TC99M TETROFOSMIN: HCPCS | Mod: S$GLB,,, | Performed by: INTERNAL MEDICINE

## 2018-06-06 PROCEDURE — 99213 OFFICE O/P EST LOW 20 MIN: CPT | Mod: S$GLB,,, | Performed by: PSYCHIATRY & NEUROLOGY

## 2018-06-06 PROCEDURE — 99999 PR PBB SHADOW E&M-EST. PATIENT-LVL III: CPT | Mod: PBBFAC,,, | Performed by: PSYCHIATRY & NEUROLOGY

## 2018-06-06 NOTE — LETTER
June 7, 2018      Karrie Vazquez MD  [address]  [place] [address]           [facility]  [address]  [place] [address]  Phone: [phone]  Fax: [phone]          Patient: Naty St   MR Number: [id number]   YOB: 1945   Date of Visit: 6/6/2018       Dear Dr. Karrie Vazquez:    Thank you for referring Naty St to me for evaluation. Attached you will find relevant portions of my assessment and plan of care.    If you have questions, please do not hesitate to call me. I look forward to following Naty St along with you.    Sincerely,    Jean Carlos Rock MD    Enclosure  CC:  No Recipients    If you would like to receive this communication electronically, please contact [email or web address] or [phone] to request more information on [facility] [employer] access.    For providers and/or their staff who would like to refer a patient to [facility], please contact us through our one-stop-shop provider referral line, [facility], at [phone].    If you feel you have received this communication in error or would no longer like to receive these types of communications, please e-mail [email or web address]

## 2018-06-06 NOTE — PROGRESS NOTES
Received consult re: additional home health PT/OT services being ordered for 4 more weeks. Patient is current with [facility]. Dr. Vazquez entered the order as an Internal Referral. Spoke with [facility] liason nurse Mari at ext. [phone], and she confirmed that the office staff received the order and all is good. Called patient at [phone] and left a detailed voice mail message for her re: these arrangements. No other needs indicated at this time.

## 2018-06-06 NOTE — TELEPHONE ENCOUNTER
----- Message from Karrie Vazquez MD sent at 6/5/2018  4:00 PM CDT -----  Needs LP done ASAP and sent for cytology.  Also needs to see Neurology for confusion, dizziness  Schedule CMP and Xgeva on 6/29/18

## 2018-06-06 NOTE — PROGRESS NOTES
[facility] - NEUROLOGY  [facility] [place]    Date: June 7, 2018   Patient Name: Naty St   MRN: [id number]   PCP: Olvin Mars  Referring Provider: Karrie Vazquez MD    Assessment:      This is Naty St, 72 y.o. female with breast cancer s/p resection of left frontal metastasis and whole brain radiation in 2016 presents back with worsening balance and cognition in the past several months which I suspect is primarily related to multiple sedating medications in the setting of worsening renal failure as well as exacerbation from recent hospitalization.  Recent MRI brain 4/2018 reviewed and stable versus multiple studies in 2017.  She has neuropsychology testing 10/2017 which showed only mild cognitive impairment and has had a marked functional decline since that time.     Plan:      -  Keppra level, may consider cessation followed by EEG to rule out continued cortical irritability v change to lamictal  -  Reduce elavil to 25mg qhs  -  Minimize ativan    Return in 1 month for repeat evaluation after she is further out from hospitalization       I discussed side effects of the medications. I asked the patient to  stop the medication if she notices serious adverse effects as we discussed and to seek immediate medical attention at an ER.     Jean Carlos Rock MD  [facility]   Department of Neurology    Subjective:      HPI:   Ms. Naty St is a 72 y.o. female who presents with a chief complaint of dizziness/memory trouble, she presents with her son who provides most of history    Son notes that over the past several months she has had increasing difficulty recalling details of conversations or keeping track of things she needs to do.  She has not had any falls but she is more unsteady on her feet and will list to one side or the other or stumble over her feet while walking but denies vertigo.  She was living independently in her own home  up until five day admit for pneumonia in April 2018 with discharge on home O2 which she no longer requires.  She is currently living with her son and requires some assistance with ADLs, continuing physical therapy.  She has been on Keppra 750mg bid since early 2016 and denies any seizure since a few days after resection of her cerebral metastasis.  She has been taking elavil 50mg qhs for many years and takes ativan as needed for sleep and anxiety.  Notes that she will sometimes go a day without ativan but never a week.    PAST MEDICAL HISTORY:  Past Medical History:   Diagnosis Date    Anticoagulant long-term use     Blood clot in vein 06/2016    Breast cancer 1994    Cataract     Hypertension     Pancreatic cancer 1998    Posterior capsular opacification, left eye     Psychiatric problem     Seizures 01/25/2016    Stroke     Therapy        PAST SURGICAL HISTORY:  Past Surgical History:   Procedure Laterality Date    BONE BIOSPY  3/9/16    BRAIN SURGERY  1/12/16    tumor removal 1/12/16    BREAST LUMPECTOMY  1998    left    CATARACT EXTRACTION Bilateral 2004    yag OU    CHOLECYSTECTOMY  1998    COLONOSCOPY N/A 11/13/2015    Procedure: COLONOSCOPY;  Surgeon: Alex Carmona MD;  Location: [facility] ([address] [place] [address]);  Service: Endoscopy;  Laterality: N/A;  2 year f/u    cysto and right ureteral stent  4/27/12    ecoli  2010    removal of blockage, done throat, then through the liver.    HYSTERECTOMY  1974    KIDNEY STONE SURGERY  2010--laser    left eswl  6/20/12    OOPHORECTOMY  4/25/2012    Laparoscopic BSO, lysis of adhesions, cystoscopy greater than 35mins     WHIPPLE PROCEDURE W/ LAPAROSCOPY  1998       CURRENT MEDS:  Current Outpatient Prescriptions   Medication Sig Dispense Refill    amitriptyline (ELAVIL) 50 MG tablet Take 1 tablet (50 mg total) by mouth every evening. 30 tablet 3    apixaban (ELIQUIS) 5 mg Tab TAKE 1 TABLET BY MOUTH TWO TIMES A DAY 60 tablet 0    atorvastatin (LIPITOR)  40 MG tablet Take 1 tablet (40 mg total) by mouth once daily. 90 tablet 3    clindamycin phosphate 1% (CLINDAGEL) 1 % gel Apply topically 2 (two) times daily. 60 g 6    CREON CpDR TAKE ONE CAPSULE BY MOUTH THREE TIMES A DAY WITH MEALS 270 capsule 3    denosumab (XGEVA) 120 mg/1.7 mL (70 mg/mL) Soln Inject 120 mg into the skin every 28 days.      dronabinol (MARINOL) 5 MG capsule Take 1 capsule (5 mg total) by mouth 2 (two) times daily before meals. 60 capsule 3    erlotinib (TARCEVA) 150 MG tablet Take 1 tablet (150 mg total) by mouth once daily. 30 tablet 2    levETIRAcetam (KEPPRA) 750 MG Tab Take 1 tablet (750 mg total) by mouth 2 (two) times daily. 60 tablet 3    LORazepam (ATIVAN) 1 MG tablet Take 1 tablet (1 mg total) by mouth 2 (two) times daily. 60 tablet 1    losartan (COZAAR) 25 MG tablet Take 1 tablet (25 mg total) by mouth 2 (two) times daily. 180 tablet 3    meclizine (ANTIVERT) 12.5 mg tablet Take 1-2 tablets (12.5-25 mg total) by mouth 3 (three) times daily as needed for Dizziness. 30 tablet 0    metoprolol succinate (TOPROL-XL) 50 MG 24 hr tablet Take 1 tablet (50 mg total) by mouth once daily. Hold until your appointment with your PCP. HR and BP has been normal off this medications. 30 tablet 11    mirabegron 50 mg Tb24 Take 1 tablet (50 mg total) by mouth once daily. 30 tablet 11    multivitamin (THERAGRAN) per tablet Take 1 tablet by mouth once daily.      NYSTATIN (DUKE'S SOLUTION) Take 10 mLs by mouth 4 (four) times daily as needed (mouth sores/pain). 240 mL 0    predniSONE (DELTASONE) 10 MG tablet Take 4 tablets (40 mg total) by mouth once daily. x7 days Then 2 tabs daily x7 days Then 1 tab daily x7 days. Then 1/2 tab daily x4 days 51 tablet 0    sodium bicarbonate 650 MG tablet Take 1 tablet (650 mg total) by mouth 3 (three) times daily. Increase dose to 2 60 mg tabs by mouth three times daily. 270 tablet 11    traMADol (ULTRAM) 50 mg tablet Take 1 tablet (50 mg total) by mouth  "every 6 (six) hours as needed for Pain. 30 tablet 0     No current facility-administered medications for this visit.      Facility-Administered Medications Ordered in Other Visits   Medication Dose Route Frequency Provider Last Rate Last Dose    denosumab (XGEVA) solution 120 mg  120 mg Subcutaneous 1 time in Clinic/HOD Karrie Vazquez MD           ALLERGIES:  Review of patient's allergies indicates:   Allergen Reactions    Tobradex [tobramycin-dexamethasone] Swelling    Iodinated contrast- oral and iv dye Hives    Morphine Other (See Comments)     "shaking" and tremors    Latex Rash    Phenytoin sodium extended Other (See Comments) and Rash       FAMILY HISTORY:  Family History   Problem Relation Age of Onset    Breast cancer Mother     Lung cancer Mother     Leukemia Paternal Grandfather     Stomach cancer Paternal Grandmother        SOCIAL HISTORY:  Social History   Substance Use Topics    Smoking status: Former Smoker     Packs/day: 0.00     Years: 0.00     Quit date: 9/26/1961    Smokeless tobacco: Never Used    Alcohol use No       Review of Systems:  12 review of systems is negative except for the symptoms mentioned in HPI.        Objective:     Vitals:    06/06/18 1426   Weight: 64 kg (141 lb)   Height: 5' 6" (1.676 m)       General: NAD, well nourished   Eyes: no tearing, discharge, no erythema   ENT: moist mucous membranes of the oral cavity, nares patent    Neck: Supple, full range of motion  Cardiovascular: Warm and well perfused, pulses equal and symmetrical  Lungs: Normal work of breathing, normal chest wall excursions  Skin: No rash, lesions, or breakdown on exposed skin  Psychiatry: Mood and affect are appropriate   Abdomen: soft, non tender, non distended  Extremeties: No cyanosis, clubbing or edema.    Neurological   MENTAL STATUS: Drowsy but responsive and cooperative, becomes more animated when clarifying points of her history although she does appear to confuse events, follows " commands    CRANIAL NERVES: Visual fields intact. PERRL. EOMI. Facial sensation intact. Face symmetrical. Hearing grossly intact. Full shoulder shrug bilaterally. Tongue protrudes midline   SENSORY: Sensation is intact to light touch throughout.    MOTOR: Normal bulk and tone. No pronator drift.  5/5 deltoid, biceps, triceps, interosseous, hand  bilaterally. 5/5 iliopsoas, knee extension/flexion, foot dorsi/plantarflexion bilaterally.    CEREBELLAR/COORDINATION/GAIT: Gait some decreased clearance.  Heel to shin intact. Finger to nose intact. Normal rapid alternating movements.

## 2018-06-07 ENCOUNTER — TELEPHONE (OUTPATIENT)
Dept: HEMATOLOGY/ONCOLOGY | Facility: CLINIC | Age: 73
End: 2018-06-07

## 2018-06-07 ENCOUNTER — TELEPHONE (OUTPATIENT)
Dept: NEUROLOGY | Facility: CLINIC | Age: 73
End: 2018-06-07

## 2018-06-07 NOTE — TELEPHONE ENCOUNTER
Spoke with patient.   Informed her once tarceva is re-approved with her insurance--the pharmacy will be in touch with her about delivery.  Patient voiced understanding.

## 2018-06-07 NOTE — TELEPHONE ENCOUNTER
Patient was calling to confirm that she was suppose to cut the elvail----- Message from Maya Young sent at 6/7/2018 11:23 AM CDT -----  Contact: self @ [phone]  Pt says she has a question concerning her appt on yesterday.  Pls call to discuss.

## 2018-06-07 NOTE — TELEPHONE ENCOUNTER
----- Message from Penny Cohen sent at 6/7/2018  3:48 PM CDT -----  Contact: pt   Pt called to speak with Zandra Kennedy#[phone]  Thank You  LOLLY Cohen

## 2018-06-08 ENCOUNTER — TELEPHONE (OUTPATIENT)
Dept: PHARMACY | Facility: CLINIC | Age: 73
End: 2018-06-08

## 2018-06-08 ENCOUNTER — TELEPHONE (OUTPATIENT)
Dept: HEMATOLOGY/ONCOLOGY | Facility: CLINIC | Age: 73
End: 2018-06-08

## 2018-06-08 NOTE — TELEPHONE ENCOUNTER
tried calling pt on today for a reminder of schedule appointment on 06/11/18, but no answer, a detail message was left on v/m with details of appointment.

## 2018-06-08 NOTE — TELEPHONE ENCOUNTER
Initial Tarceva consult declined on 2018 as patient was on medication before Tagrisso and is transitioning back. Tarceva will be shipped on 2018 to arrive at patient's home on 2018 via [employer]. $0.00 copay. Patient plans to start Tarceva on 2018. Address confirmed. Confirmed 2 patient identifiers - name and . Therapy Appropriate.     Patient was counseled on the administration directions:  Take 1 tablet (150mg) by mouth once daily on an empty stomach, take either 1 hour before or 2 hours after meals.      DDIs: Medication list reviewed and potential DDIs addressed.     Patient will return leftover Tagrisso to pharmacy or provider for proper disposal.    Patient confirmed understanding. Patient did not have additional questions

## 2018-06-12 DIAGNOSIS — C79.31 SECONDARY ADENOCARCINOMA OF BRAIN: ICD-10-CM

## 2018-06-12 RX ORDER — LEVETIRACETAM 750 MG/1
750 TABLET ORAL 2 TIMES DAILY
Qty: 60 TABLET | Refills: 3 | Status: SHIPPED | OUTPATIENT
Start: 2018-06-12 | End: 2018-07-12

## 2018-06-13 ENCOUNTER — TELEPHONE (OUTPATIENT)
Dept: HEMATOLOGY/ONCOLOGY | Facility: CLINIC | Age: 73
End: 2018-06-13

## 2018-06-13 DIAGNOSIS — C34.90 MALIGNANT NEOPLASM OF LUNG, UNSPECIFIED LATERALITY, UNSPECIFIED PART OF LUNG: Primary | ICD-10-CM

## 2018-06-13 NOTE — TELEPHONE ENCOUNTER
Received consult from Dr. Vazquez re: order for patient's home oxygen to be discontinued. Faxed order to [facility] at ext. [phone], and called company at ext. [phone]. Spoke with Silva díaz: this order and she is passing it on to the appropriate staff to process and contact patient to schedule the  of the oxygen equipment from her home. Called patient's home number and discussed these arrangements. Patient stated understanding and agreement. Also called [facility] liason nurse [place] at ext. [phone] and left a voice mail message for her, as Dr. Vazquez entered the order as Subsequent Home Health Orders (patient's home health nurse had called the clinic about patient not using the oxygen and her room air sats being good). Will continue to follow and assist as needs identified

## 2018-06-13 NOTE — TELEPHONE ENCOUNTER
----- Message from Maya Catalan sent at 6/13/2018 10:42 AM CDT -----  Contact: Edie with [facility]  Edie needs to speak with the nurse to get clarification on how to use the oxygen the patient has in her home.  Edie's # is [phone]

## 2018-06-13 NOTE — TELEPHONE ENCOUNTER
Spoke with  nurse.  She has not used her home oxygen since she was in the hospital--and she is being charged as a result of still having it in her possession. Oxygen sats have been normal. Patient is requesting equipment to be picked up from her home---and an order needs to be written for it to be done so.    Message routed to dr sullivan (last clinic o2 was 97% on room air) and alyssa

## 2018-06-19 ENCOUNTER — TELEPHONE (OUTPATIENT)
Dept: PHARMACY | Facility: CLINIC | Age: 73
End: 2018-06-19

## 2018-06-19 NOTE — TELEPHONE ENCOUNTER
Patient reports that she has been tolerating Tarceva restart well with no missed doses.  She takes medication each morning as she wakes on an empty stomach.  She started noticing bumps on her skin since restarting.  They do not persist and resolve on their own.  Advised that she can use OTC hydrocortisone cream if the areas itch or rash lingers.  She will report any progression to pharmacy or provider.  Overall though she reports restart has been going well.

## 2018-06-21 ENCOUNTER — TELEPHONE (OUTPATIENT)
Dept: HEMATOLOGY/ONCOLOGY | Facility: CLINIC | Age: 73
End: 2018-06-21

## 2018-06-21 NOTE — TELEPHONE ENCOUNTER
Spoke with patient she is requesting refill on keppra.  Nurse informed patient she would send message over to dr mckeon's staff, as he is the ordering MD.  Patient requesting script to go to [facility] on file.    Message routed to dr mckeon's staff

## 2018-06-21 NOTE — TELEPHONE ENCOUNTER
----- Message from Penny Cohen sent at 6/21/2018 10:17 AM CDT -----  Contact: pt   Rx Refill/Request     Is this a Refill or New Rx:  refill  Rx Name and Strength:    Preferred Pharmacy with phone number: [facility]  [phone] (Phone)  [phone] (Fax)  Communication Preference:phone [phone]  Additional Information:   Thank You  LOLLY Cohen

## 2018-06-25 ENCOUNTER — TELEPHONE (OUTPATIENT)
Dept: ADMINISTRATIVE | Facility: CLINIC | Age: 73
End: 2018-06-25

## 2018-06-26 DIAGNOSIS — I63.9 RIGHT-SIDED CEREBROVASCULAR ACCIDENT (CVA): Primary | ICD-10-CM

## 2018-06-26 NOTE — TELEPHONE ENCOUNTER
Spoke w/ pt son, Basil, in r/t eliquis prescription unable to be refilled at pt pharmacy [facility]. D/w pt will have script refilled and sent to preferred pharmacy. Will notify ROSA De Paz as well as forward prescription refill to Dr. Vazquez.     Will notify pt when prescription confirmed, per pt request.

## 2018-06-26 NOTE — TELEPHONE ENCOUNTER
----- Message from Kim Holt sent at 6/26/2018 11:15 AM CDT -----  Contact: Pt son  Pt son calling stating that pt is having trouble rx for Eliisabella Gracia call back number [phone]

## 2018-06-28 ENCOUNTER — TELEPHONE (OUTPATIENT)
Dept: HEMATOLOGY/ONCOLOGY | Facility: CLINIC | Age: 73
End: 2018-06-28

## 2018-06-28 DIAGNOSIS — C34.2 MALIGNANT NEOPLASM OF MIDDLE LOBE, BRONCHUS OR LUNG: ICD-10-CM

## 2018-06-28 DIAGNOSIS — C34.90 MALIGNANT NEOPLASM OF LUNG, UNSPECIFIED LATERALITY, UNSPECIFIED PART OF LUNG: Primary | ICD-10-CM

## 2018-06-28 NOTE — TELEPHONE ENCOUNTER
----- Message from Penny Cohen sent at 6/28/2018  9:14 AM CDT -----  Contact: pt son   Pt son called to speak with nurse Zandra Kennedy#[phone]  Thank You  LOLLY Cohen

## 2018-06-28 NOTE — TELEPHONE ENCOUNTER
Spoke with patient's son. He states patient is wanting to go stay out of state to stay with her sister for about a month - 6 weeks soon. Basil is wondering when dr sullivan needs to see his mom again, as she is only scheduled for labs and xgeva this Friday----so he can try to coordinate patient going out of town. Son notes his mom is due for a petscan too soon.  Nurse informed son she would contact son back after speaking with dr sullivan.   Son voiced understanding.    Message routed to dr sullivan

## 2018-06-28 NOTE — TELEPHONE ENCOUNTER
Left vm for patient's son with all appointments.  Requested a return phone call if any changes needed to be made.

## 2018-06-29 ENCOUNTER — TELEPHONE (OUTPATIENT)
Dept: HEMATOLOGY/ONCOLOGY | Facility: CLINIC | Age: 73
End: 2018-06-29

## 2018-06-29 ENCOUNTER — OFFICE VISIT (OUTPATIENT)
Dept: CARDIOLOGY | Facility: CLINIC | Age: 73
End: 2018-06-29
Payer: MEDICARE

## 2018-06-29 ENCOUNTER — INFUSION (OUTPATIENT)
Dept: INFUSION THERAPY | Facility: HOSPITAL | Age: 73
End: 2018-06-29
Attending: INTERNAL MEDICINE
Payer: MEDICARE

## 2018-06-29 VITALS
SYSTOLIC BLOOD PRESSURE: 158 MMHG | WEIGHT: 145.31 LBS | DIASTOLIC BLOOD PRESSURE: 75 MMHG | BODY MASS INDEX: 24.21 KG/M2 | HEIGHT: 65 IN | HEART RATE: 65 BPM

## 2018-06-29 DIAGNOSIS — N18.30 CKD (CHRONIC KIDNEY DISEASE) STAGE 3, GFR 30-59 ML/MIN: ICD-10-CM

## 2018-06-29 DIAGNOSIS — C34.31 MALIGNANT NEOPLASM OF LOWER LOBE OF RIGHT LUNG: Primary | ICD-10-CM

## 2018-06-29 DIAGNOSIS — E78.5 DYSLIPIDEMIA: ICD-10-CM

## 2018-06-29 DIAGNOSIS — I10 ESSENTIAL HYPERTENSION: ICD-10-CM

## 2018-06-29 DIAGNOSIS — I42.0 DILATED CARDIOMYOPATHY: Primary | ICD-10-CM

## 2018-06-29 PROCEDURE — 99999 PR PBB SHADOW E&M-EST. PATIENT-LVL IV: CPT | Mod: PBBFAC,,, | Performed by: INTERNAL MEDICINE

## 2018-06-29 PROCEDURE — 99214 OFFICE O/P EST MOD 30 MIN: CPT | Mod: S$GLB,,, | Performed by: INTERNAL MEDICINE

## 2018-06-29 PROCEDURE — 3078F DIAST BP <80 MM HG: CPT | Mod: CPTII,S$GLB,, | Performed by: INTERNAL MEDICINE

## 2018-06-29 PROCEDURE — 63600175 PHARM REV CODE 636 W HCPCS: Mod: JG | Performed by: INTERNAL MEDICINE

## 2018-06-29 PROCEDURE — 99499 UNLISTED E&M SERVICE: CPT | Mod: S$GLB,,, | Performed by: INTERNAL MEDICINE

## 2018-06-29 PROCEDURE — 96372 THER/PROPH/DIAG INJ SC/IM: CPT

## 2018-06-29 PROCEDURE — 3077F SYST BP >= 140 MM HG: CPT | Mod: CPTII,S$GLB,, | Performed by: INTERNAL MEDICINE

## 2018-06-29 RX ORDER — LOSARTAN POTASSIUM 50 MG/1
50 TABLET ORAL 2 TIMES DAILY
Qty: 60 TABLET | Refills: 10 | Status: SHIPPED | OUTPATIENT
Start: 2018-06-29 | End: 2019-06-25 | Stop reason: SDUPTHER

## 2018-06-29 RX ADMIN — DENOSUMAB 120 MG: 120 INJECTION SUBCUTANEOUS at 02:06

## 2018-06-29 NOTE — PATIENT INSTRUCTIONS
Increase Losartan to 50 mg twice daily ( 2 of your current 25 mg tablets twice a day until gone then one 50 mg tablet twice daily )  Continue all other medications

## 2018-06-29 NOTE — TELEPHONE ENCOUNTER
----- Message from Karrie Vazquez MD sent at 6/29/2018  2:50 PM CDT -----  Signed Xgeva for today. Please make sure she is taking calcium supplements. Corrected is 9.3 so OK but she will need to take at least 1000 mg of calcium

## 2018-06-29 NOTE — PROGRESS NOTES
Subjective:   Patient ID:  Naty St is a 72 y.o. female who presents for follow-up of Cardiomyopathy      HPI:   Naty St presents for follow up of non-ischemic cardiomyopathy and hypertension.She has a mild NICM qwith Ef borderline normal. Naty St has hypertension with BP elevated this visit. Naty St denies chest pain, shortness of breath, palpitations, presyncope , or syncope. Naty St has dyslipidemia  on high intensity statin .She is on a DOAC for a DVT..  .  Review of Systems   Constitution: Positive for weakness. Negative for malaise/fatigue, weight gain and weight loss.   Eyes: Negative for blurred vision.   Cardiovascular: Negative for chest pain, claudication, cyanosis, dyspnea on exertion, irregular heartbeat, leg swelling, near-syncope, orthopnea, palpitations, paroxysmal nocturnal dyspnea and syncope.   Respiratory: Negative for cough, shortness of breath and wheezing.    Musculoskeletal: Negative for falls and myalgias.   Gastrointestinal: Negative for abdominal pain, heartburn, nausea and vomiting.   Genitourinary: Negative for nocturia.   Neurological: Positive for loss of balance. Negative for brief paralysis, dizziness, focal weakness, headaches, numbness and paresthesias.             CVA in October 2017    Psychiatric/Behavioral: Negative for altered mental status.       Current Outpatient Prescriptions   Medication Sig    amitriptyline (ELAVIL) 50 MG tablet Take 1 tablet (50 mg total) by mouth every evening.    apixaban (ELIQUIS) 5 mg Tab TAKE 1 TABLET BY MOUTH TWO TIMES A DAY    atorvastatin (LIPITOR) 40 MG tablet Take 1 tablet (40 mg total) by mouth once daily.    clindamycin phosphate 1% (CLINDAGEL) 1 % gel Apply topically 2 (two) times daily.    CREON CpDR TAKE ONE CAPSULE BY MOUTH THREE TIMES A DAY WITH MEALS    levETIRAcetam (KEPPRA) 750 MG Tab Take 1 tablet (750 mg total) by mouth 2 (two) times daily.     "LORazepam (ATIVAN) 1 MG tablet Take 1 tablet (1 mg total) by mouth 2 (two) times daily.    losartan (COZAAR) 50 MG tablet Take 1 tablet (50 mg total) by mouth 2 (two) times daily.    metoprolol succinate (TOPROL-XL) 50 MG 24 hr tablet Take 1 tablet (50 mg total) by mouth once daily. Hold until your appointment with your PCP. HR and BP has been normal off this medications.    multivitamin (THERAGRAN) per tablet Take 1 tablet by mouth once daily.    sodium bicarbonate 650 MG tablet Take 1 tablet (650 mg total) by mouth 3 (three) times daily. Increase dose to 2 60 mg tabs by mouth three times daily.    denosumab (XGEVA) 120 mg/1.7 mL (70 mg/mL) Soln Inject 120 mg into the skin every 28 days.    dronabinol (MARINOL) 5 MG capsule Take 1 capsule (5 mg total) by mouth 2 (two) times daily before meals.    erlotinib (TARCEVA) 150 MG tablet Take 1 tablet (150 mg total) by mouth once daily.    meclizine (ANTIVERT) 12.5 mg tablet Take 1-2 tablets (12.5-25 mg total) by mouth 3 (three) times daily as needed for Dizziness.    mirabegron 50 mg Tb24 Take 1 tablet (50 mg total) by mouth once daily.    NYSTATIN (DUKE'S SOLUTION) Take 10 mLs by mouth 4 (four) times daily as needed (mouth sores/pain).    predniSONE (DELTASONE) 10 MG tablet Take 4 tablets (40 mg total) by mouth once daily. x7 days Then 2 tabs daily x7 days Then 1 tab daily x7 days. Then 1/2 tab daily x4 days    traMADol (ULTRAM) 50 mg tablet Take 1 tablet (50 mg total) by mouth every 6 (six) hours as needed for Pain.     No current facility-administered medications for this visit.      Facility-Administered Medications Ordered in Other Visits   Medication    denosumab (XGEVA) solution 120 mg     Objective:   Physical Exam   Constitutional: She is oriented to person, place, and time. She appears well-developed. No distress.   BP (!) 158/75   Pulse 65   Ht 5' 5" (1.651 m)   Wt 65.9 kg (145 lb 4.5 oz)   LMP  (LMP Unknown)   BMI 24.18 kg/m²    HENT:   Head: " Normocephalic.   Eyes: Conjunctivae are normal. Pupils are equal, round, and reactive to light. No scleral icterus.   Neck: Neck supple. No JVD present. No thyromegaly present.   Cardiovascular: Normal rate, regular rhythm, normal heart sounds and intact distal pulses.  PMI is not displaced.  Exam reveals no gallop and no friction rub.    No murmur heard.  1+ bilateral pedal edema.  No JVD   Pulmonary/Chest: Effort normal and breath sounds normal. No respiratory distress. She has no wheezes. She has no rales.   Abdominal: Soft. She exhibits no distension. There is no splenomegaly or hepatomegaly. There is no tenderness.   Musculoskeletal: She exhibits no edema or tenderness.   Gait normal   Neurological: She is alert and oriented to person, place, and time.   Skin: Skin is warm and dry. She is not diaphoretic.   Psychiatric: She has a normal mood and affect. Her behavior is normal.       Lab Results   Component Value Date     06/29/2018    K 3.5 06/29/2018     (H) 06/29/2018    CO2 26 06/29/2018    BUN 16 06/29/2018    CREATININE 1.5 (H) 06/29/2018     (H) 06/29/2018    HGBA1C 4.1 10/08/2017    MG 2.4 05/02/2018    AST 44 (H) 06/29/2018    ALT 39 06/29/2018    ALBUMIN 2.6 (L) 06/29/2018    PROT 5.2 (L) 06/29/2018    BILITOT 1.2 (H) 06/29/2018    WBC 8.21 06/05/2018    HGB 12.3 06/05/2018    HCT 39.9 06/05/2018    HCT 25 (L) 01/12/2016    MCV 89 06/05/2018     06/05/2018    TSH 2.020 06/06/2018    CHOL 172 10/08/2017    HDL 76 (H) 10/08/2017    LDLCALC 83.4 10/08/2017    TRIG 63 10/08/2017       Assessment:     1. Dilated cardiomyopathy : Mild-stable   2. Essential hypertension : Elevation this visit   3. CKD (chronic kidney disease) stage 3, GFR 30-59 ml/min :    4. Dyslipidemia :  on high intensity statin       Plan:     Naty was seen today for cardiomyopathy.    Diagnoses and all orders for this visit:    Dilated cardiomyopathy  -     losartan (COZAAR) 50 MG tablet; Take 1 tablet (50  mg total) by mouth 2 (two) times daily ( an increase 0.  Has routine lab with Heme-Onc  Essential hypertension  -     losartan (COZAAR) 50 MG tablet; Take 1 tablet (50 mg total) by mouth 2 (two) times daily.    CKD (chronic kidney disease) stage 3, GFR 30-59 ml/min  Has routine lab with Heme-Onc  Dyslipidemia  Continue current regimen

## 2018-06-29 NOTE — NURSING
Pt received Xgeva injection, tolerated well. NAD. Corrected calcium 9.3, ok'd to give. Pt discharged home independently.

## 2018-07-03 ENCOUNTER — TELEPHONE (OUTPATIENT)
Dept: PHARMACY | Facility: CLINIC | Age: 73
End: 2018-07-03

## 2018-07-03 NOTE — TELEPHONE ENCOUNTER
Patient called back for refill of Tarceva. Patient is tolerating the medication fairly well and denies any major side effects. The rash patient previously had has resolved and patient states that she does not have any bumps or itchy rash. Denies any diarrhea,N/V, COB, or decreased appetite. She states that her stool has been lighter, but not heide-colored and denied any dark, tea colored urine. Discussed with patient that if she notices any of these side effects to notify the provider. We also discussed s/sx of cardiotoxicity (SOB, chest pain/pressure, irregular HR) and liver and kidney dysfunction. Patient confirmed understanding.    Patient confirms that she takes 1 tablet in the morning on an empty stomach. She has been on  Tarceva and Tagrisso in the past and is aware of handling, storage, and administration of Tarceva. Medication list reviewed; no new meds, allergies or health conditions.  She is aware to reach out to [facility] with any further questions or concerns.

## 2018-07-04 ENCOUNTER — NURSE TRIAGE (OUTPATIENT)
Dept: ADMINISTRATIVE | Facility: CLINIC | Age: 73
End: 2018-07-04

## 2018-07-05 ENCOUNTER — TELEPHONE (OUTPATIENT)
Dept: CARDIOLOGY | Facility: CLINIC | Age: 73
End: 2018-07-05

## 2018-07-05 RX ORDER — AMLODIPINE BESYLATE 5 MG/1
5 TABLET ORAL DAILY
Qty: 30 TABLET | Refills: 6 | Status: SHIPPED | OUTPATIENT
Start: 2018-07-05 | End: 2019-03-14 | Stop reason: SDUPTHER

## 2018-07-05 RX ORDER — METOPROLOL SUCCINATE 50 MG/1
50 TABLET, EXTENDED RELEASE ORAL DAILY
Start: 2018-07-05 | End: 2019-05-27

## 2018-07-05 NOTE — TELEPHONE ENCOUNTER
Spoke with the pt's son at 9:50 am. Reviewed the pt's BP meds - says that she is taking the increased dose of losartan 50 mg twice a day that was increased on 6-29-18 per Dr. Mcelroy, but is not taking metoprolol succinate - has never heard of that medication - asked that I call and speak with the pt. Spoke with the pt - says that she didn't take metoprolol because on the prescription instructions say to hold medication -  until she sees her PCP - pt hasn't had an appt with her PCP.  Pt took BP/HR while on the phone - /86 and HR 74.  Spoke with Dr. Cardoza since  is out of the office - says for the pt to take metoprolol succinate 50 mg one daily. Instructions given to the pt and called and spoke with the pt's son as well they both verbalized understanding.

## 2018-07-05 NOTE — TELEPHONE ENCOUNTER
"  Reason for Disposition   BP  >= 180/110    Answer Assessment - Initial Assessment Questions  1. BLOOD PRESSURE: "What is the blood pressure?" "Did you take at least two measurements 5 minutes apart?"      171/94 about 2 hrs prior to call- current is 188/101 hr 78  2. ONSET: "When did you take your blood pressure?"      As above  3. HOW: "How did you obtain the blood pressure?" (e.g., visiting nurse, automatic home BP monitor)      Home monitor  4. HISTORY: "Do you have a history of high blood pressure?"      yes  5. MEDICATIONS: "Are you taking any medications for blood pressure?" "Have you missed any doses recently?"      Denied missing doses.  6. OTHER SYMPTOMS: "Do you have any symptoms?" (e.g., headache, chest pain, blurred vision, difficulty breathing, weakness)      None reported  7. PREGNANCY: "Is there any chance you are pregnant?" "When was your last menstrual period?"      n/a    Protocols used:  HIGH BLOOD PRESSURE-A-    Advised her she can call her home health agency [facility] to see if they have any other orders/recommendations related to her b/p.  "

## 2018-07-05 NOTE — TELEPHONE ENCOUNTER
----- Message from Wendy Ibarra sent at 7/5/2018  8:11 AM CDT -----  Contact: Patient's son/caregiver  The Pt's son is calling to report that his mothers BP is still elevated after her change in medication-yesterday 155/86, 177/93, he states that it has never been that high before, but it has been high. Please call Basil floyd @ [phone]. Thanks, Wendy  6/29/2018 Dr. Mcelroy

## 2018-07-05 NOTE — TELEPHONE ENCOUNTER
Spoke with Edie - says that she is at the pt's home and that the pt has been taking metoprolol succinate for two weeks - has only 10 pills in the prescription bottle - pt had said earlier that she wasn't taking the medication.  Spoke with Dr. Cardoza - says to add amlodipine 5 mg every am to the pts meds. Instructions given to Nisha and she verbalized understanding. New Rx sent to Dr. Cardoza for her signature.

## 2018-07-05 NOTE — TELEPHONE ENCOUNTER
----- Message from Giulia Angel MA sent at 7/5/2018  2:33 PM CDT -----  Contact: [facility]- [phone] Occupational Therapist  She is calling  about pt.'s b/p being high 176/92. pt.Last visit [id number] call.

## 2018-07-11 ENCOUNTER — LAB VISIT (OUTPATIENT)
Dept: LAB | Facility: HOSPITAL | Age: 73
End: 2018-07-11
Attending: INTERNAL MEDICINE
Payer: MEDICARE

## 2018-07-11 ENCOUNTER — HOSPITAL ENCOUNTER (OUTPATIENT)
Dept: RADIOLOGY | Facility: HOSPITAL | Age: 73
Discharge: HOME | End: 2018-07-11
Attending: INTERNAL MEDICINE
Payer: MEDICARE

## 2018-07-11 DIAGNOSIS — C34.2 MALIGNANT NEOPLASM OF MIDDLE LOBE, BRONCHUS OR LUNG: ICD-10-CM

## 2018-07-11 DIAGNOSIS — C34.90 MALIGNANT NEOPLASM OF LUNG, UNSPECIFIED LATERALITY, UNSPECIFIED PART OF LUNG: ICD-10-CM

## 2018-07-11 LAB
ALBUMIN SERPL BCP-MCNC: 2.9 G/DL
ALP SERPL-CCNC: 79 U/L
ALT SERPL W/O P-5'-P-CCNC: 31 U/L
ANION GAP SERPL CALC-SCNC: 8 MMOL/L
AST SERPL-CCNC: 40 U/L
BILIRUB SERPL-MCNC: 1.5 MG/DL
BUN SERPL-MCNC: 23 MG/DL
CALCIUM SERPL-MCNC: 8.5 MG/DL
CHLORIDE SERPL-SCNC: 111 MMOL/L
CO2 SERPL-SCNC: 22 MMOL/L
CREAT SERPL-MCNC: 1.4 MG/DL
ERYTHROCYTE [DISTWIDTH] IN BLOOD BY AUTOMATED COUNT: 14.6 %
EST. GFR  (AFRICAN AMERICAN): 43 ML/MIN/1.73 M^2
EST. GFR  (NON AFRICAN AMERICAN): 37.3 ML/MIN/1.73 M^2
GLUCOSE SERPL-MCNC: 102 MG/DL
HCT VFR BLD AUTO: 43.5 %
HGB BLD-MCNC: 13.4 G/DL
IMM GRANULOCYTES # BLD AUTO: 0.02 K/UL
MCH RBC QN AUTO: 27.9 PG
MCHC RBC AUTO-ENTMCNC: 30.8 G/DL
MCV RBC AUTO: 91 FL
NEUTROPHILS # BLD AUTO: 3 K/UL
PLATELET # BLD AUTO: 203 K/UL
PMV BLD AUTO: 10 FL
POCT GLUCOSE: 93 MG/DL (ref 70–110)
POTASSIUM SERPL-SCNC: 3.6 MMOL/L
PROT SERPL-MCNC: 5.8 G/DL
RBC # BLD AUTO: 4.8 M/UL
SODIUM SERPL-SCNC: 141 MMOL/L
WBC # BLD AUTO: 5.75 K/UL

## 2018-07-11 PROCEDURE — 85027 COMPLETE CBC AUTOMATED: CPT

## 2018-07-11 PROCEDURE — 78815 PET IMAGE W/CT SKULL-THIGH: CPT | Mod: 26,PS,, | Performed by: RADIOLOGY

## 2018-07-11 PROCEDURE — 80053 COMPREHEN METABOLIC PANEL: CPT

## 2018-07-11 PROCEDURE — 78815 PET IMAGE W/CT SKULL-THIGH: CPT | Mod: TC

## 2018-07-11 PROCEDURE — A9552 F18 FDG: HCPCS

## 2018-07-11 PROCEDURE — 36415 COLL VENOUS BLD VENIPUNCTURE: CPT

## 2018-07-12 ENCOUNTER — OFFICE VISIT (OUTPATIENT)
Dept: NEUROLOGY | Facility: CLINIC | Age: 73
End: 2018-07-12
Payer: MEDICARE

## 2018-07-12 ENCOUNTER — OFFICE VISIT (OUTPATIENT)
Dept: HEMATOLOGY/ONCOLOGY | Facility: CLINIC | Age: 73
End: 2018-07-12
Payer: MEDICARE

## 2018-07-12 VITALS
HEART RATE: 77 BPM | SYSTOLIC BLOOD PRESSURE: 146 MMHG | DIASTOLIC BLOOD PRESSURE: 80 MMHG | BODY MASS INDEX: 23.06 KG/M2 | HEIGHT: 66 IN | WEIGHT: 143.5 LBS

## 2018-07-12 VITALS
HEART RATE: 82 BPM | WEIGHT: 144.19 LBS | DIASTOLIC BLOOD PRESSURE: 69 MMHG | BODY MASS INDEX: 23.17 KG/M2 | SYSTOLIC BLOOD PRESSURE: 158 MMHG | TEMPERATURE: 98 F | OXYGEN SATURATION: 99 % | RESPIRATION RATE: 19 BRPM | HEIGHT: 66 IN

## 2018-07-12 DIAGNOSIS — C34.31 MALIGNANT NEOPLASM OF LOWER LOBE OF RIGHT LUNG: Primary | ICD-10-CM

## 2018-07-12 DIAGNOSIS — C79.51 SECONDARY CANCER OF BONE: ICD-10-CM

## 2018-07-12 DIAGNOSIS — I63.441 CEREBRAL INFARCTION DUE TO EMBOLISM OF RIGHT CEREBELLAR ARTERY: ICD-10-CM

## 2018-07-12 DIAGNOSIS — N18.30 STAGE 3 CHRONIC KIDNEY DISEASE: ICD-10-CM

## 2018-07-12 DIAGNOSIS — C79.31 BRAIN METASTASES: ICD-10-CM

## 2018-07-12 DIAGNOSIS — G93.41 METABOLIC ENCEPHALOPATHY: ICD-10-CM

## 2018-07-12 DIAGNOSIS — C79.31 BRAIN METASTASES: Primary | ICD-10-CM

## 2018-07-12 DIAGNOSIS — G40.909 SEIZURE DISORDER: ICD-10-CM

## 2018-07-12 PROCEDURE — 3077F SYST BP >= 140 MM HG: CPT | Mod: CPTII,S$GLB,, | Performed by: INTERNAL MEDICINE

## 2018-07-12 PROCEDURE — 99999 PR PBB SHADOW E&M-EST. PATIENT-LVL IV: CPT | Mod: PBBFAC,,, | Performed by: INTERNAL MEDICINE

## 2018-07-12 PROCEDURE — 99215 OFFICE O/P EST HI 40 MIN: CPT | Mod: S$GLB,,, | Performed by: INTERNAL MEDICINE

## 2018-07-12 PROCEDURE — 99999 PR PBB SHADOW E&M-EST. PATIENT-LVL III: CPT | Mod: PBBFAC,,, | Performed by: PSYCHIATRY & NEUROLOGY

## 2018-07-12 PROCEDURE — 99214 OFFICE O/P EST MOD 30 MIN: CPT | Mod: S$GLB,,, | Performed by: PSYCHIATRY & NEUROLOGY

## 2018-07-12 PROCEDURE — 3079F DIAST BP 80-89 MM HG: CPT | Mod: CPTII,S$GLB,, | Performed by: PSYCHIATRY & NEUROLOGY

## 2018-07-12 PROCEDURE — 3077F SYST BP >= 140 MM HG: CPT | Mod: CPTII,S$GLB,, | Performed by: PSYCHIATRY & NEUROLOGY

## 2018-07-12 PROCEDURE — 3078F DIAST BP <80 MM HG: CPT | Mod: CPTII,S$GLB,, | Performed by: INTERNAL MEDICINE

## 2018-07-12 RX ORDER — LEVETIRACETAM 500 MG/1
500 TABLET ORAL 2 TIMES DAILY
Qty: 180 TABLET | Refills: 1 | Status: SHIPPED | OUTPATIENT
Start: 2018-07-12 | End: 2019-01-31 | Stop reason: SDUPTHER

## 2018-07-12 NOTE — PROGRESS NOTES
[facility] - NEUROLOGY  [facility] [place]    Date: July 12, 2018   Patient Name: Naty St   MRN: [id number]   PCP: Olvin Mars  Referring Provider: No ref. provider found    Assessment:      This is Naty St, 72 y.o. female with breast cancer s/p resection of left frontal metastasis and whole brain radiation in 2016 prolonged recover following hospitalization 4/2018.  Recent MRI brain 4/2018 reviewed and stable versus multiple studies in 2017.  She has neuropsychology testing 10/2017 which showed only mild cognitive impairment.     Plan:      -  Keppra 750->500mg bid  -  Cont elavil to 25mg qhs  -  Minimize ativan    Return in 6 month       I discussed side effects of the medications. I asked the patient to  stop the medication if she notices serious adverse effects as we discussed and to seek immediate medical attention at an ER.     Jean Carlos Rock MD  [facility]   Department of Neurology    Subjective:   Marked improvement in mental status since last visit, attends to all ADLs and lives with , family reports mental status at baseline follow resection of frontal met in 2017    HPI 6/2018:   Ms. Naty St is a 72 y.o. female who presents with a chief complaint of dizziness/memory trouble, she presents with her son who provides most of history    Son notes that over the past several months she has had increasing difficulty recalling details of conversations or keeping track of things she needs to do.  She has not had any falls but she is more unsteady on her feet and will list to one side or the other or stumble over her feet while walking but denies vertigo.  She was living independently in her own home up until five day admit for pneumonia in April 2018 with discharge on home O2 which she no longer requires.  She is currently living with her son and requires some assistance with ADLs, continuing physical therapy.  She  has been on Keppra 750mg bid since early 2016 and denies any seizure since a few days after resection of her cerebral metastasis.  She has been taking elavil 50mg qhs for many years and takes ativan as needed for sleep and anxiety.  Notes that she will sometimes go a day without ativan but never a week.    PAST MEDICAL HISTORY:  Past Medical History:   Diagnosis Date    Anticoagulant long-term use     Blood clot in vein 06/2016    Breast cancer 1994    Cataract     Hypertension     Pancreatic cancer 1998    Posterior capsular opacification, left eye     Psychiatric problem     Seizures 01/25/2016    Stroke     Therapy        PAST SURGICAL HISTORY:  Past Surgical History:   Procedure Laterality Date    BONE BIOSPY  3/9/16    BRAIN SURGERY  1/12/16    tumor removal 1/12/16    BREAST LUMPECTOMY  1998    left    CATARACT EXTRACTION Bilateral 2004    yag OU    CHOLECYSTECTOMY  1998    COLONOSCOPY N/A 11/13/2015    Procedure: COLONOSCOPY;  Surgeon: Alex Carmona MD;  Location: [facility] ([address] [place] [address]);  Service: Endoscopy;  Laterality: N/A;  2 year f/u    cysto and right ureteral stent  4/27/12    ecoli  2010    removal of blockage, done throat, then through the liver.    HYSTERECTOMY  1974    KIDNEY STONE SURGERY  2010--laser    left eswl  6/20/12    OOPHORECTOMY  4/25/2012    Laparoscopic BSO, lysis of adhesions, cystoscopy greater than 35mins     WHIPPLE PROCEDURE W/ LAPAROSCOPY  1998       CURRENT MEDS:  Current Outpatient Prescriptions   Medication Sig Dispense Refill    amitriptyline (ELAVIL) 50 MG tablet Take 1 tablet (50 mg total) by mouth every evening. 30 tablet 3    amLODIPine (NORVASC) 5 MG tablet Take 1 tablet (5 mg total) by mouth once daily. 30 tablet 6    apixaban (ELIQUIS) 5 mg Tab TAKE 1 TABLET BY MOUTH TWO TIMES A DAY 60 tablet 0    atorvastatin (LIPITOR) 40 MG tablet Take 1 tablet (40 mg total) by mouth once daily. 90 tablet 3    clindamycin phosphate 1% (CLINDAGEL) 1 % gel  Apply topically 2 (two) times daily. 60 g 6    CREON CpDR TAKE ONE CAPSULE BY MOUTH THREE TIMES A DAY WITH MEALS 270 capsule 3    denosumab (XGEVA) 120 mg/1.7 mL (70 mg/mL) Soln Inject 120 mg into the skin every 28 days.      dronabinol (MARINOL) 5 MG capsule Take 1 capsule (5 mg total) by mouth 2 (two) times daily before meals. 60 capsule 3    erlotinib (TARCEVA) 150 MG tablet Take 1 tablet (150 mg total) by mouth once daily. 30 tablet 2    levETIRAcetam (KEPPRA) 750 MG Tab Take 1 tablet (750 mg total) by mouth 2 (two) times daily. 60 tablet 3    LORazepam (ATIVAN) 1 MG tablet Take 1 tablet (1 mg total) by mouth 2 (two) times daily. 60 tablet 1    losartan (COZAAR) 50 MG tablet Take 1 tablet (50 mg total) by mouth 2 (two) times daily. 60 tablet 10    meclizine (ANTIVERT) 12.5 mg tablet Take 1-2 tablets (12.5-25 mg total) by mouth 3 (three) times daily as needed for Dizziness. 30 tablet 0    metoprolol succinate (TOPROL-XL) 50 MG 24 hr tablet Take 1 tablet (50 mg total) by mouth once daily.      mirabegron 50 mg Tb24 Take 1 tablet (50 mg total) by mouth once daily. 30 tablet 11    multivitamin (THERAGRAN) per tablet Take 1 tablet by mouth once daily.      NYSTATIN (DUKE'S SOLUTION) Take 10 mLs by mouth 4 (four) times daily as needed (mouth sores/pain). 240 mL 0    predniSONE (DELTASONE) 10 MG tablet Take 4 tablets (40 mg total) by mouth once daily. x7 days Then 2 tabs daily x7 days Then 1 tab daily x7 days. Then 1/2 tab daily x4 days 51 tablet 0    traMADol (ULTRAM) 50 mg tablet Take 1 tablet (50 mg total) by mouth every 6 (six) hours as needed for Pain. 30 tablet 0    sodium bicarbonate 650 MG tablet Take 1 tablet (650 mg total) by mouth 3 (three) times daily. Increase dose to 2 60 mg tabs by mouth three times daily. 270 tablet 11     No current facility-administered medications for this visit.      Facility-Administered Medications Ordered in Other Visits   Medication Dose Route Frequency Provider  "Last Rate Last Dose    denosumab (XGEVA) solution 120 mg  120 mg Subcutaneous 1 time in Clinic/HOD Karrie Vazquez MD           ALLERGIES:  Review of patient's allergies indicates:   Allergen Reactions    Tobradex [tobramycin-dexamethasone] Swelling    Iodinated contrast- oral and iv dye Hives    Morphine Other (See Comments)     "shaking" and tremors    Latex Rash    Phenytoin sodium extended Other (See Comments) and Rash       FAMILY HISTORY:  Family History   Problem Relation Age of Onset    Breast cancer Mother     Lung cancer Mother     Leukemia Paternal Grandfather     Stomach cancer Paternal Grandmother        SOCIAL HISTORY:  Social History   Substance Use Topics    Smoking status: Former Smoker     Packs/day: 0.00     Years: 0.00     Quit date: 9/26/1961    Smokeless tobacco: Never Used    Alcohol use No       Review of Systems:  12 review of systems is negative except for the symptoms mentioned in HPI.        Objective:     Vitals:    07/12/18 1030   BP: (!) 146/80   Pulse: 77   Weight: 65.1 kg (143 lb 8.3 oz)   Height: 5' 6" (1.676 m)       General: NAD, well nourished   Eyes: no tearing, discharge, no erythema   ENT: moist mucous membranes of the oral cavity, nares patent    Neck: Supple, full range of motion  Cardiovascular: Warm and well perfused, pulses equal and symmetrical  Lungs: Normal work of breathing, normal chest wall excursions  Skin: No rash, lesions, or breakdown on exposed skin  Psychiatry: Mood and affect are appropriate   Abdomen: soft, non tender, non distended  Extremeties: No cyanosis, clubbing or edema.    Neurological   MENTAL STATUS: Fully alert and oriented, skips May when doing months in reverse, recall 3/3, some impairment in abstraction   CRANIAL NERVES: Visual fields intact. PERRL. EOMI. Facial sensation intact. Face symmetrical. Hearing grossly intact. Full shoulder shrug bilaterally. Tongue protrudes midline   SENSORY: Sensation is intact to light touch " throughout.    MOTOR: Normal bulk and tone. No pronator drift.  5/5 deltoid, biceps, triceps, interosseous, hand  bilaterally. 5/5 iliopsoas, knee extension/flexion, foot dorsi/plantarflexion bilaterally.    CEREBELLAR/COORDINATION/GAIT: Gait some decreased clearance.  Heel to shin intact. Finger to nose intact. Normal rapid alternating movements.

## 2018-07-12 NOTE — PROGRESS NOTES
"Subjective:       Patient ID: Naty St is a 72 y.o. female.    Chief Complaint: Lung Cancer and decreased appetite  Oncologic History:  Ms. Naty St was admitted to the hospital between 01/25/2016 and 01/28/2016. She has a history of pancreatic cancer 17 years ago, breast cancer and meningioma, presented to the hospital complaining of loss of consciousness. The patient apparently was in her normal state of health and she stood up to go to the bathroom and fell to the floor. The patient's daughter helped the mother up in the bathroom and noted that her mother's upper extremities were shaking and eye rolling. No reports of bowel or bladder incontinence, tongue biting or rolling. The second episode lasted about four minutes and she was extremely lethargic following that. Apparently, the patient had a meningioma resection done in the past; however, recently, underwent neurosurgical resection with Dr. 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    Additional immunohistochemical stains were performed which revealed the tumor cells to be are positive for TTF1 and negative for ER and GCDFP. The morphology and TTF1 positivity are most consistent with lung primary.    Of note, imaging scan at the end of January 2016 revealed a mass in the lung at 2 cm in the medial aspect of the apical segment of the right upper lobe abutting the mediastinum at the level of the azygous vein and abutting and possibly encasing the segmental bronchi and vessels of the apical segment of the right upper lobe and no pleural fluid was present. Also, there is an enlarged right paratracheal lymph node. No evidence of any metastatic disease at the pancreatic site with postoperative changes post Whipple disease  Her PET Scan from 2/15/16 reveal "Hypermetabolic mass in the right lung apex consistent with a primary " "malignancy. Hypermetabolic mediastinal lymph nodes consistent with metastatic disease. Right sacral hypermetabolic lesion consistent with metastatic disease, noting additional mildly sclerotic lesions in multiple vertebral bodies which do not demonstrate abnormal hypermetabolism  She underwent IR bone biopsy which revealed metastatic adenocarcinoma of lung origin. 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  She has completed focal RT to brain lesions in March 2016.    PET scan from 6/6/16 shows "Dramatic almost complete response to therapy."  Lovenox started after US lower ext 6/7/16 shows Acute complete occlusion of one of the left posterior tibial vein.Remote partial thrombus of the proximal left superficial femoral vein.  She is on Tarceva.   12/6/16 PET scan reveals "Stable right upper lobe lesion and right hilar lymph node. No new lesions identified. Trace left pleural effusion".  1/27/17 Renal u/s "Medical renal disease. Nonobstructive right nephrolithiasis"  1/31/17 PET - "Right upper lobe nodule and right hilar lymph node similar and very low grade activity.  There is no definite evidence of recurrence."   11/9/17 PET "In this patient with history of lung cancer, there is interval increase in size and hypermetabolism of a right upper lobe lung lesion concerning for recurrent disease. Additionally, there is interval appearance of a hypermetabolic paratracheal lymph node suspicious for metastatic disease"                 She progressed on Tarceva She underwent EBUS on 12/5/17. Pathology revealed adenocarcinoma but T790m could not be done as quantity was not insufficient. Her PET scan from 1/30/18 revealed The patient's previously identified abnormal lesions is slightly greater uptake of FDG on today's study compared with prior exam. These findings are concerning for progression of disease"      She was hospitalized between 4/9/18-4/16/18. Her hospital course was as follows "Patient was admitted to [facility] " "service on 4/9 with acute hypoxic respiratory failure. She was requiring 4 L of nc on admission. She was started on moxi for CAP. She received one dose of ceftriaxone in the ED. On 2nd day of admission she spiked a fever. Her Hb was ~7 g/dl so she was transfused 1 unit of PRBCs. Over night she developed worsening of her symptoms with increased O2 requirements to 15 L HFNC. Her CXR showed worsening congestion. There was a concern of TRALI vs TACO. New 2D echo with diastolic dysfunction. She was given IV lasix pushes as needed and was started on dexamethasone.  Her abx was escalated to vanc and cefepime. She improved with diuresis and steroids. Pulmonary were consulted. Her O2 requirements continued to improve. On 4/15 she was switched to Augmentin. PT recommended discharging her to SNF but the patient refused and she wanted to go home with home health. She qualified for home oxygen so she was discharged on 3 L nc while at rest and 6 while active. Augmentin and dexamethasone were discontinued on discharge"     She was readmitted from 4/27 to 5/3/18 with who presented to the ED with a complaint of fatigue and worsening DELA CRUZ. Pt diagnosed PNA 4/9 and was discharged on 4/16 on 3L oxygen. She was initially placed on cefepime for 3 days followed by an add'l 2 days of Augmentin. Pulm was consulted with assistance as her oxygen requirements were increasing. She was placed on oral steroids, then trailed on 80mg TID solumedrol for 2 days and will be discharged on a prednisone taper. She was also diuresed with 2 days of 40mg IV lasix with appropriate UOP resulting, improvement on repeat CXR. She was medically stable and appropriate for DC home with home health on 1L continuous O2. She will be seen for close f/u and may be able to come off oxygen at that time.      She discontinued Tagrisso in April 2018. Restaging scans from 6/4/18 revealed "Stable appearance of a spiculated right upper lobe pulmonary lesion.  Additional " "irregular focus superior to the lesion in the right lung apex is stable and may represent scar or additional lesion; although this was not hypermetabolic on prior PET-CT.  No new pulmonary lesions. 1.3 cm subcarinal lymph node, not hypermetabolic on prior PET-CT. Stable postsurgical changes of a Whipple procedure. Bilateral nonobstructing nephrolithiasis.  Stable sclerotic focus in the T5 vertebral body, possibly a bone island as this was not hypermetabolic on prior PET-CT. Small hiatal hernia. RECIST SUMMARY: Date of prior examination for comparison 01/30/2018 Lesion 1: Right upper lobe 1.3 cm Series 2 image 31 prior measurement 1.3 cm"  Bone scan also from 6/4/18 revealed no evidence of mets.    [facility] which reveals "Right suprahilar lesion SUV max 3.76, previously 6.86. Pretracheal lymph node SUV max 2.55, previously 3.79. There is physiologic intracranial, head, and neck activity.  There is muscle activation.  There is vocal cord activation.  There is physiologic liver, spleen, GI and  activity.  There is a hiatal hernia.  Pelvic organs show nothing unusual.  No bone lesions are seen.  There are areas of benign appearing lung scar"  She notes fatigue.  She denies any nausea, vomiting, diarrhea, constipation, abdominal pain, weight loss or loss of appetite, chest pain, shortness of breath, leg swelling, fatigue, pain, headache, dizziness, or mood changes. Her ECOG PS is 2. She is accompanied by her  and son    She is back on Tarceva since 6/5/18    Review of Systems   Constitutional: Positive for fatigue. Negative for appetite change and unexpected weight change.   HENT: Negative for mouth sores.    Eyes: Negative for visual disturbance.   Respiratory: Negative for cough and shortness of breath.    Cardiovascular: Negative for chest pain.   Gastrointestinal: Negative for abdominal pain and diarrhea.   Genitourinary: Negative for frequency.   Musculoskeletal: Negative for " back pain.   Skin: Negative for rash.   Neurological: Negative for headaches.   Hematological: Negative for adenopathy.   Psychiatric/Behavioral: The patient is not nervous/anxious.    All other systems reviewed and are negative.      PMFSH: all information reviewed and updated as relevant to today's visit  Objective:      Physical Exam   Constitutional: She is oriented to person, place, and time. She appears well-developed and well-nourished.   HENT:   Mouth/Throat: No oropharyngeal exudate.   Cardiovascular: Normal rate and normal heart sounds.    Pulmonary/Chest: Effort normal and breath sounds normal. She has no wheezes.   Abdominal: Soft. Bowel sounds are normal. There is no tenderness.   Musculoskeletal: She exhibits no edema or tenderness.   Lymphadenopathy:     She has no cervical adenopathy.   Neurological: She is alert and oriented to person, place, and time. Coordination normal.   Skin: Skin is warm and dry. No rash noted.   Psychiatric: She has a normal mood and affect. Judgment and thought content normal.   Vitals reviewed.      LABS:  WBC   Date Value Ref Range Status   07/11/2018 5.75 3.90 - 12.70 K/uL Final     Hemoglobin   Date Value Ref Range Status   07/11/2018 13.4 12.0 - 16.0 g/dL Final     POC Hematocrit   Date Value Ref Range Status   01/12/2016 25 (L) 36 - 54 %PCV Final     Hematocrit   Date Value Ref Range Status   07/11/2018 43.5 37.0 - 48.5 % Final     Platelets   Date Value Ref Range Status   07/11/2018 203 150 - 350 K/uL Final     Gran # (ANC)   Date Value Ref Range Status   07/11/2018 3.0 1.8 - 7.7 K/uL Final     Comment:     The ANC is based on a white cell differential from an   automated cell counter. It has not been microscopically   reviewed for the presence of abnormal cells. Clinical   correlation is required.         Chemistry        Component Value Date/Time     07/11/2018 1131    K 3.6 07/11/2018 1131     (H) 07/11/2018 1131    CO2 22 (L) 07/11/2018 1131    BUN 23  07/11/2018 1131    CREATININE 1.4 07/11/2018 1131     07/11/2018 1131        Component Value Date/Time    CALCIUM 8.5 (L) 07/11/2018 1131    ALKPHOS 79 07/11/2018 1131    AST 40 07/11/2018 1131    ALT 31 07/11/2018 1131    BILITOT 1.5 (H) 07/11/2018 1131    ESTGFRAFRICA 43.0 (A) 07/11/2018 1131    EGFRNONAA 37.3 (A) 07/11/2018 1131          Assessment:       1. Malignant neoplasm of lower lobe of right lung    2. Secondary cancer of bone    3. Brain metastases    4. Stage 3 chronic kidney disease        Plan:        1,2,3. She is responding to Tarceva. Reviewed PEt scan and she will continue with Tarceva and Xgeva.    4. Stable on meds.    Above care plan was discussed with patient and accompanying  and son and all questions were addressed to their satisfaction

## 2018-07-15 RX ORDER — LORAZEPAM 1 MG/1
TABLET ORAL
Qty: 60 TABLET | Refills: 1 | Status: SHIPPED | OUTPATIENT
Start: 2018-07-15 | End: 2018-10-24 | Stop reason: SDUPTHER

## 2018-07-17 ENCOUNTER — TELEPHONE (OUTPATIENT)
Dept: HEMATOLOGY/ONCOLOGY | Facility: CLINIC | Age: 73
End: 2018-07-17

## 2018-07-17 NOTE — TELEPHONE ENCOUNTER
----- Message from Willie Ramirez sent at 7/17/2018  4:36 PM CDT -----  Contact: SonAniket Gracia   Will like a call from Zandra in regards to pt feeling very weak and running high fever    Contact::[phone]

## 2018-07-17 NOTE — TELEPHONE ENCOUNTER
Spoke with son---  He notes patient is at [facility] in [place] for fever/weakness.  Son will stay in touch with nurse/dr sullivan.  Nurse thanked son.

## 2018-07-18 ENCOUNTER — TELEPHONE (OUTPATIENT)
Dept: HEMATOLOGY/ONCOLOGY | Facility: CLINIC | Age: 73
End: 2018-07-18

## 2018-07-18 ENCOUNTER — TELEPHONE (OUTPATIENT)
Dept: CARDIOLOGY | Facility: CLINIC | Age: 73
End: 2018-07-18

## 2018-07-18 NOTE — TELEPHONE ENCOUNTER
Spoke with patient's son.  He notes patient is admitted with possible (not confirmed) UTI at [facility] in [place].   Basil has numerous questions about her BP regimen---nurse advised son to contact dr marroquin's office about this---as the last couple phone notes were not exactly clear.    Son requested nurse to send message to his staff---so he could be contacted to discuss.    Message routed to dr marroquin's staff

## 2018-07-18 NOTE — TELEPHONE ENCOUNTER
----- Message from Willie Ramirez sent at 7/18/2018  8:44 AM CDT -----  Contact: Basil   Will like a call in regards to pt possibly having a urinary tract infection and also mixing medications     Contact::[phone]

## 2018-07-18 NOTE — TELEPHONE ENCOUNTER
----- Message from Penny Cohen sent at 7/18/2018 11:46 AM CDT -----  Contact: pt son  Pt son called to speak with Dr Vazquez about pt   Callback #[phone]  Thank You    LOLLY Cohen

## 2018-07-18 NOTE — TELEPHONE ENCOUNTER
"Spoke with son.   He states he was notified his mom is now jaundiced----and they are working her up right now.  Son doesn't have the phone number to [facility] in [place], but he is requesting dr sullivan to reach out to the ED physician taking care of her right now, as he wants "them to know everything."   Son doesn't know MD's name.  Nurse encouraged son to have the ED reach out to our clinic instead.  Son states, "she needs to call them."  Nurse explained to son dr sullivan's was in clinic this morning and has meetings all afternoon.  Son states, "i understand. But she needs to call. She knows her best. I know she has other patients. But still."  Nurse informed son she would forward message to dr sullivan.    Message routed to dr sullivan           [facility] [phone]  "

## 2018-07-18 NOTE — TELEPHONE ENCOUNTER
----- Message from Kim Holt sent at 7/18/2018  8:32 AM CDT -----  Contact: Pt son Basil  Pt son calling stating that he was speaking with Zadnra and got disconnected. He would like a call back       Basil call back number [phone]

## 2018-07-19 NOTE — TELEPHONE ENCOUNTER
----- Message from Penny Cohen sent at 7/19/2018  3:37 PM CDT -----  Contact: Dr. Azam Nascimento called to speak with Dr Vazquez about pt   Callback#[phone]  Thank You  LOLLY Cohen

## 2018-07-20 ENCOUNTER — TELEPHONE (OUTPATIENT)
Dept: HEMATOLOGY/ONCOLOGY | Facility: CLINIC | Age: 73
End: 2018-07-20

## 2018-07-20 NOTE — TELEPHONE ENCOUNTER
----- Message from Kim Holt sent at 7/20/2018  8:34 AM CDT -----  Contact: Pt son Basil  Pt son calling regarding pt admission to hospital and he also has questions regarding medication.        Basil call back number [phone]

## 2018-07-20 NOTE — TELEPHONE ENCOUNTER
Spoke with son. He wanted to make sure dr sullivan and dr rangel spoke about his mom's care over at [facility] in [place].  Son confirmed the MDs spoke yesterday.

## 2018-07-23 ENCOUNTER — TELEPHONE (OUTPATIENT)
Dept: HEMATOLOGY/ONCOLOGY | Facility: CLINIC | Age: 73
End: 2018-07-23

## 2018-07-23 ENCOUNTER — PATIENT MESSAGE (OUTPATIENT)
Dept: HEMATOLOGY/ONCOLOGY | Facility: CLINIC | Age: 73
End: 2018-07-23

## 2018-07-23 NOTE — TELEPHONE ENCOUNTER
----- Message from Kim Holt sent at 7/23/2018  9:46 AM CDT -----  Contact: Pt son  Pt son calling with questions regarding pt injection        Basil call back number [phone]

## 2018-07-23 NOTE — TELEPHONE ENCOUNTER
----- Message from Willie Ramirez sent at 7/23/2018 12:38 PM CDT -----  Contact: Basil De Paz's call     Contact::[phone]

## 2018-07-23 NOTE — TELEPHONE ENCOUNTER
----- Message from Willie Ramirez sent at 7/23/2018 12:38 PM CDT -----  Contact: Basil De Paz's call     Contact::[phone]

## 2018-07-24 NOTE — TELEPHONE ENCOUNTER
----- Message from Penny Cohen sent at 7/24/2018 12:30 PM CDT -----  Contact: Pt son  Pt son Called to speak with nurse Zandra Kennedy#[phone]  Thank You  LOLLY Cohen

## 2018-07-25 ENCOUNTER — TELEPHONE (OUTPATIENT)
Dept: HEMATOLOGY/ONCOLOGY | Facility: CLINIC | Age: 73
End: 2018-07-25

## 2018-07-25 NOTE — TELEPHONE ENCOUNTER
----- Message from Kim Holt sent at 7/25/2018 10:05 AM CDT -----  Contact: Pt son  Pt son calling regarding pt injection and blood work      Basil call back number [phone]

## 2018-07-25 NOTE — TELEPHONE ENCOUNTER
Left vm for son informing him patient does not need xgeva before next previously scheduled appointment.

## 2018-07-25 NOTE — TELEPHONE ENCOUNTER
Spoke with son.  He is calling to speak with nurse about getting orders for her DC labs from [facility] admit.  Nurse advised son he needs to get the orders from the MD at [facility] to see what exactly was needed--  Son voiced understanding.    Nurse reviewed all appointments with son.

## 2018-07-26 ENCOUNTER — TELEPHONE (OUTPATIENT)
Dept: PHARMACY | Facility: CLINIC | Age: 73
End: 2018-07-26

## 2018-07-27 ENCOUNTER — TELEPHONE (OUTPATIENT)
Dept: PHARMACY | Facility: CLINIC | Age: 73
End: 2018-07-27

## 2018-07-27 DIAGNOSIS — C34.31 MALIGNANT NEOPLASM OF LOWER LOBE OF RIGHT LUNG: ICD-10-CM

## 2018-07-27 RX ORDER — ERLOTINIB HYDROCHLORIDE 150 MG/1
150 TABLET, FILM COATED ORAL DAILY
Qty: 30 TABLET | Refills: 2 | Status: ON HOLD | OUTPATIENT
Start: 2018-07-27 | End: 2018-09-24 | Stop reason: SDUPTHER

## 2018-07-27 RX ORDER — ERLOTINIB HYDROCHLORIDE 150 MG/1
150 TABLET, FILM COATED ORAL DAILY
Qty: 30 TABLET | Refills: 2 | Status: SHIPPED | OUTPATIENT
Start: 2018-07-27 | End: 2018-07-27 | Stop reason: SDUPTHER

## 2018-07-27 NOTE — TELEPHONE ENCOUNTER
[phone]- Yohana (daughter)     Patient daughter reached out to [facility] regard her mom specialty medication for TARCEVA to inform she is not at home right now to set up delivery & would like for us to transfer to [facility] to [facility] (mail order) since we can't ship to [place]. Inform her I will reach out to the provider to have the transfer forward to [facility] for her to set up delivery with them. She voiced understanding.     [facility]    [phone] (Phone)  [phone] (Fax)

## 2018-07-31 ENCOUNTER — TELEPHONE (OUTPATIENT)
Dept: PHARMACY | Facility: CLINIC | Age: 73
End: 2018-07-31

## 2018-07-31 NOTE — TELEPHONE ENCOUNTER
M for follow up to see if patient has heard from [facility] concerning her Tarceva. Tarceva Rx was transferred on 7/27.

## 2018-08-02 ENCOUNTER — TELEPHONE (OUTPATIENT)
Dept: HEMATOLOGY/ONCOLOGY | Facility: CLINIC | Age: 73
End: 2018-08-02

## 2018-08-02 NOTE — TELEPHONE ENCOUNTER
----- Message from Penny Cohen sent at 8/2/2018  2:26 PM CDT -----  Contact: pt Son   Pt son called to speak with nurse   Callback#[phone]  Thank You  LOLLY Cohen

## 2018-08-02 NOTE — TELEPHONE ENCOUNTER
----- Message from Willie Ramirez sent at 8/2/2018  1:18 PM CDT -----  Contact: Basil   Will like a call from Zandra in regards to pt going to back to ER     Contact::[phone]

## 2018-08-02 NOTE — TELEPHONE ENCOUNTER
----- Message from Kim Holt sent at 8/2/2018  2:24 PM CDT -----  Contact: Pt son Basil  Pt son calling regarding pt treatment plan        Basil call back number [phone]

## 2018-08-02 NOTE — TELEPHONE ENCOUNTER
"Spoke with patient's son.  Son states his mom was admitted again at [facility] for allergic reaction to an antibiotic.  Patient was DC'd and is home with her daughter now.    Son is calling to see when dr sullivan can see his mom, as he is worried about her general health and is convinced "something is going on."    Nurse advised son dr sullivan is out of town until the end of next week----she may want to follow up with her pcp, as patient is already scheduled with dr sullivan in august with repeat petscans.    Son states he would rather his mom not see her pcp, as her pcp is not very engaging and up to date.    Nurse informed son she would contact him back after speaking with dr sullivan upon her return.    Message routed to dr sullivan   "

## 2018-08-06 ENCOUNTER — TELEPHONE (OUTPATIENT)
Dept: HEMATOLOGY/ONCOLOGY | Facility: CLINIC | Age: 73
End: 2018-08-06

## 2018-08-06 NOTE — TELEPHONE ENCOUNTER
Informed son nurse spoke with dr lake----patient needs to follow up with her PCP--  Nurse will speak with NP who sent message to nurse about patient---to make her best suggestion of what may need to be drawn tomorrow.  Nurse isn't certain whether NP will be seeing patient at her home or in clinic.  Nurse is awaiting response from NP staff.  Nurse will request results to be faxed to dr sullivan.

## 2018-08-06 NOTE — TELEPHONE ENCOUNTER
"Spoke with patient's son.   He is calling to request patient to be seen by dr sullivan for a check-up as he is worried "something is going on." he can provide no details of any present symptoms.     Son states his mom will be in town until Wednesday, then she is going back to [place] to stay with her daughter.    Patient was admitted to [facility] with fever/weakness mid July--nurse doesn't know specifics, as son doesn't know details------patient was discharged with antibiotics--  And was readmitted again for a "reaction to antibiotics." she is now home for the next couple days, and son is wanting patient to be seen in clinic.  Nurse informed son dr sullivan is out of the office until later this week---and it sounds like she needs to follow up with her pcp as opposed to dr sullivan.    Son states he will have the NP at [facility] give nurse a call with specific updates.    Son requested nurse to speak with another MD---  Nurse informed son she would contact him back after speaking with another provider.      Message routed to dr lake    "

## 2018-08-06 NOTE — TELEPHONE ENCOUNTER
----- Message from Penny Cohen sent at 8/6/2018  8:55 AM CDT -----  Contact: Pt Son Basil   Pt son Basil called to speak with Nurse Zandra Kennedy#[phone]  Thank You  LOLLY Cohen

## 2018-08-07 DIAGNOSIS — N39.0 URINARY TRACT INFECTION WITHOUT HEMATURIA, SITE UNSPECIFIED: Primary | ICD-10-CM

## 2018-08-08 ENCOUNTER — LAB VISIT (OUTPATIENT)
Dept: LAB | Facility: HOSPITAL | Age: 73
End: 2018-08-08
Attending: INTERNAL MEDICINE
Payer: MEDICARE

## 2018-08-08 DIAGNOSIS — C34.90 MALIGNANT NEOPLASM OF LUNG, UNSPECIFIED LATERALITY, UNSPECIFIED PART OF LUNG: ICD-10-CM

## 2018-08-08 LAB
ALBUMIN SERPL BCP-MCNC: 2.9 G/DL
ALP SERPL-CCNC: 136 U/L
ALT SERPL W/O P-5'-P-CCNC: 27 U/L
ANION GAP SERPL CALC-SCNC: 6 MMOL/L
AST SERPL-CCNC: 34 U/L
BILIRUB SERPL-MCNC: 1.8 MG/DL
BUN SERPL-MCNC: 22 MG/DL
CALCIUM SERPL-MCNC: 9.5 MG/DL
CHLORIDE SERPL-SCNC: 110 MMOL/L
CO2 SERPL-SCNC: 27 MMOL/L
CREAT SERPL-MCNC: 1.8 MG/DL
ERYTHROCYTE [DISTWIDTH] IN BLOOD BY AUTOMATED COUNT: 14.4 %
EST. GFR  (AFRICAN AMERICAN): 31.7 ML/MIN/1.73 M^2
EST. GFR  (NON AFRICAN AMERICAN): 27.5 ML/MIN/1.73 M^2
GLUCOSE SERPL-MCNC: 83 MG/DL
HCT VFR BLD AUTO: 39.1 %
HGB BLD-MCNC: 11.4 G/DL
IMM GRANULOCYTES # BLD AUTO: 0.08 K/UL
MCH RBC QN AUTO: 27.4 PG
MCHC RBC AUTO-ENTMCNC: 29.2 G/DL
MCV RBC AUTO: 94 FL
NEUTROPHILS # BLD AUTO: 3.5 K/UL
PLATELET # BLD AUTO: 307 K/UL
PMV BLD AUTO: 9.7 FL
POTASSIUM SERPL-SCNC: 5 MMOL/L
PROT SERPL-MCNC: 5.7 G/DL
RBC # BLD AUTO: 4.16 M/UL
SODIUM SERPL-SCNC: 143 MMOL/L
WBC # BLD AUTO: 6.52 K/UL

## 2018-08-08 PROCEDURE — 80053 COMPREHEN METABOLIC PANEL: CPT

## 2018-08-08 PROCEDURE — 85027 COMPLETE CBC AUTOMATED: CPT

## 2018-08-08 PROCEDURE — 36415 COLL VENOUS BLD VENIPUNCTURE: CPT

## 2018-08-09 ENCOUNTER — TELEPHONE (OUTPATIENT)
Dept: HEMATOLOGY/ONCOLOGY | Facility: CLINIC | Age: 73
End: 2018-08-09

## 2018-08-09 ENCOUNTER — LAB VISIT (OUTPATIENT)
Dept: LAB | Facility: HOSPITAL | Age: 73
End: 2018-08-09
Attending: INTERNAL MEDICINE
Payer: MEDICARE

## 2018-08-09 DIAGNOSIS — R31.9 HEMATURIA, UNSPECIFIED TYPE: ICD-10-CM

## 2018-08-09 DIAGNOSIS — R31.9 HEMATURIA, UNSPECIFIED TYPE: Primary | ICD-10-CM

## 2018-08-09 DIAGNOSIS — N39.0 URINARY TRACT INFECTION WITHOUT HEMATURIA, SITE UNSPECIFIED: ICD-10-CM

## 2018-08-09 LAB
BACTERIA #/AREA URNS AUTO: ABNORMAL /HPF
BILIRUB UR QL STRIP: NEGATIVE
CLARITY UR REFRACT.AUTO: ABNORMAL
COLOR UR AUTO: YELLOW
GLUCOSE UR QL STRIP: NEGATIVE
HGB UR QL STRIP: ABNORMAL
KETONES UR QL STRIP: NEGATIVE
LEUKOCYTE ESTERASE UR QL STRIP: NEGATIVE
MICROSCOPIC COMMENT: ABNORMAL
NITRITE UR QL STRIP: NEGATIVE
PH UR STRIP: 7 [PH] (ref 5–8)
PROT UR QL STRIP: NEGATIVE
RBC #/AREA URNS AUTO: 16 /HPF (ref 0–4)
SP GR UR STRIP: 1.01 (ref 1–1.03)
URN SPEC COLLECT METH UR: ABNORMAL
UROBILINOGEN UR STRIP-ACNC: NEGATIVE EU/DL
WBC #/AREA URNS AUTO: 1 /HPF (ref 0–5)

## 2018-08-09 PROCEDURE — 81001 URINALYSIS AUTO W/SCOPE: CPT

## 2018-08-09 NOTE — TELEPHONE ENCOUNTER
----- Message from Willie Ramirez sent at 8/9/2018  9:03 AM CDT -----  Contact: Pt   Pt states she's about to drop urine specimen off to lab     Contact::[phone]

## 2018-08-13 ENCOUNTER — TELEPHONE (OUTPATIENT)
Dept: HEMATOLOGY/ONCOLOGY | Facility: CLINIC | Age: 73
End: 2018-08-13

## 2018-08-13 NOTE — TELEPHONE ENCOUNTER
----- Message from Willie Ramirez sent at 8/13/2018 12:01 PM CDT -----  Contact: Basil   Will like a call from Zandra in regards to results from 8..8.18 lab work and 8.9.18 urine specimen     Contact::[phone]

## 2018-08-14 NOTE — TELEPHONE ENCOUNTER
----- Message from Penny Cohen sent at 8/14/2018  8:04 AM CDT -----  Contact: Pt son  Pt son called to speak with nurse Zandra Kennedy #[phone]  Thank You  LOLLY Cohen

## 2018-08-15 ENCOUNTER — TELEPHONE (OUTPATIENT)
Dept: CARDIOLOGY | Facility: CLINIC | Age: 73
End: 2018-08-15

## 2018-08-15 NOTE — TELEPHONE ENCOUNTER
----- Message from Alexandra Rios sent at 8/15/2018 11:17 AM CDT -----  Contact: [facility] [phone]   Pharmacy need clarification if the pt should be taking Toprol-XL 50 mg 24hr tab and Cozaar 50 mg? He states the pt Toprol was prescribed by another doctor.  LOV 6/29/18 Dr. Mcelroy    Thanks

## 2018-08-21 ENCOUNTER — INFUSION (OUTPATIENT)
Dept: INFUSION THERAPY | Facility: HOSPITAL | Age: 73
End: 2018-08-21
Attending: INTERNAL MEDICINE
Payer: MEDICARE

## 2018-08-21 ENCOUNTER — OFFICE VISIT (OUTPATIENT)
Dept: HEMATOLOGY/ONCOLOGY | Facility: CLINIC | Age: 73
End: 2018-08-21
Payer: MEDICARE

## 2018-08-21 VITALS
TEMPERATURE: 99 F | SYSTOLIC BLOOD PRESSURE: 145 MMHG | BODY MASS INDEX: 22.82 KG/M2 | WEIGHT: 142 LBS | DIASTOLIC BLOOD PRESSURE: 65 MMHG | HEART RATE: 75 BPM | HEIGHT: 66 IN | RESPIRATION RATE: 20 BRPM | OXYGEN SATURATION: 97 %

## 2018-08-21 DIAGNOSIS — C79.51 SECONDARY CANCER OF BONE: ICD-10-CM

## 2018-08-21 DIAGNOSIS — C34.31 MALIGNANT NEOPLASM OF LOWER LOBE OF RIGHT LUNG: Primary | ICD-10-CM

## 2018-08-21 DIAGNOSIS — C79.31 BRAIN METASTASES: ICD-10-CM

## 2018-08-21 PROCEDURE — 96372 THER/PROPH/DIAG INJ SC/IM: CPT

## 2018-08-21 PROCEDURE — 99215 OFFICE O/P EST HI 40 MIN: CPT | Mod: S$GLB,,, | Performed by: INTERNAL MEDICINE

## 2018-08-21 PROCEDURE — 3078F DIAST BP <80 MM HG: CPT | Mod: CPTII,S$GLB,, | Performed by: INTERNAL MEDICINE

## 2018-08-21 PROCEDURE — 3077F SYST BP >= 140 MM HG: CPT | Mod: CPTII,S$GLB,, | Performed by: INTERNAL MEDICINE

## 2018-08-21 PROCEDURE — 63600175 PHARM REV CODE 636 W HCPCS: Mod: JG | Performed by: INTERNAL MEDICINE

## 2018-08-21 PROCEDURE — 99999 PR PBB SHADOW E&M-EST. PATIENT-LVL V: CPT | Mod: PBBFAC,,, | Performed by: INTERNAL MEDICINE

## 2018-08-21 RX ORDER — LEVOFLOXACIN 500 MG/1
TABLET, FILM COATED ORAL
Refills: 0 | COMMUNITY
Start: 2018-07-20 | End: 2018-09-21

## 2018-08-21 RX ADMIN — DENOSUMAB 120 MG: 120 INJECTION SUBCUTANEOUS at 11:08

## 2018-08-21 NOTE — PROGRESS NOTES
"Subjective:       Patient ID: Naty St is a 72 y.o. female.    Chief Complaint: Malignant neoplasm of lower lobe of right lung  Oncologic History:  Ms. Naty St was admitted to the hospital between 01/25/2016 and 01/28/2016. She has a history of pancreatic cancer 17 years ago, breast cancer and meningioma, presented to the hospital complaining of loss of consciousness. The patient apparently was in her normal state of health and she stood up to go to the bathroom and fell to the floor. The patient's daughter helped the mother up in the bathroom and noted that her mother's upper extremities were shaking and eye rolling. No reports of bowel or bladder incontinence, tongue biting or rolling. The second episode lasted about four minutes and she was extremely lethargic following that. Apparently, the patient had a meningioma resection done in the past; however, recently, underwent neurosurgical resection with Dr. 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    Additional immunohistochemical stains were performed which revealed the tumor cells to be are positive for TTF1 and negative for ER and GCDFP. The morphology and TTF1 positivity are most consistent with lung primary.    Of note, imaging scan at the end of January 2016 revealed a mass in the lung at 2 cm in the medial aspect of the apical segment of the right upper lobe abutting the mediastinum at the level of the azygous vein and abutting and possibly encasing the segmental bronchi and vessels of the apical segment of the right upper lobe and no pleural fluid was present. Also, there is an enlarged right paratracheal lymph node. No evidence of any metastatic disease at the pancreatic site with postoperative changes post Whipple disease  Her PET Scan from 2/15/16 reveal "Hypermetabolic mass in the right lung apex consistent with a " "primary malignancy. Hypermetabolic mediastinal lymph nodes consistent with metastatic disease. Right sacral hypermetabolic lesion consistent with metastatic disease, noting additional mildly sclerotic lesions in multiple vertebral bodies which do not demonstrate abnormal hypermetabolism  She underwent IR bone biopsy which revealed metastatic adenocarcinoma of lung origin. 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  She has completed focal RT to brain lesions in March 2016.    PET scan from 6/6/16 shows "Dramatic almost complete response to therapy."  Lovenox started after US lower ext 6/7/16 shows Acute complete occlusion of one of the left posterior tibial vein.Remote partial thrombus of the proximal left superficial femoral vein.  She is on Tarceva.   12/6/16 PET scan reveals "Stable right upper lobe lesion and right hilar lymph node. No new lesions identified. Trace left pleural effusion".  1/27/17 Renal u/s "Medical renal disease. Nonobstructive right nephrolithiasis"  1/31/17 PET - "Right upper lobe nodule and right hilar lymph node similar and very low grade activity.  There is no definite evidence of recurrence."   11/9/17 PET "In this patient with history of lung cancer, there is interval increase in size and hypermetabolism of a right upper lobe lung lesion concerning for recurrent disease. Additionally, there is interval appearance of a hypermetabolic paratracheal lymph node suspicious for metastatic disease"                 She progressed on Tarceva She underwent EBUS on 12/5/17. Pathology revealed adenocarcinoma but T790m could not be done as quantity was not insufficient. Her PET scan from 1/30/18 revealed The patient's previously identified abnormal lesions is slightly greater uptake of FDG on today's study compared with prior exam. These findings are concerning for progression of disease"      She was hospitalized between 4/9/18-4/16/18. Her hospital course was as follows "Patient was admitted to " "hemonc service on 4/9 with acute hypoxic respiratory failure. She was requiring 4 L of nc on admission. She was started on moxi for CAP. She received one dose of ceftriaxone in the ED. On 2nd day of admission she spiked a fever. Her Hb was ~7 g/dl so she was transfused 1 unit of PRBCs. Over night she developed worsening of her symptoms with increased O2 requirements to 15 L HFNC. Her CXR showed worsening congestion. There was a concern of TRALI vs TACO. New 2D echo with diastolic dysfunction. She was given IV lasix pushes as needed and was started on dexamethasone.  Her abx was escalated to vanc and cefepime. She improved with diuresis and steroids. Pulmonary were consulted. Her O2 requirements continued to improve. On 4/15 she was switched to Augmentin. PT recommended discharging her to SNF but the patient refused and she wanted to go home with home health. She qualified for home oxygen so she was discharged on 3 L nc while at rest and 6 while active. Augmentin and dexamethasone were discontinued on discharge"     She was readmitted from 4/27 to 5/3/18 with who presented to the ED with a complaint of fatigue and worsening DELA CRUZ. Pt diagnosed PNA 4/9 and was discharged on 4/16 on 3L oxygen. She was initially placed on cefepime for 3 days followed by an add'l 2 days of Augmentin. Pulm was consulted with assistance as her oxygen requirements were increasing. She was placed on oral steroids, then trailed on 80mg TID solumedrol for 2 days and will be discharged on a prednisone taper. She was also diuresed with 2 days of 40mg IV lasix with appropriate UOP resulting, improvement on repeat CXR. She was medically stable and appropriate for DC home with home health on 1L continuous O2. She will be seen for close f/u and may be able to come off oxygen at that time.      She discontinued Tagrisso in April 2018. Restaging scans from 6/4/18 revealed "Stable appearance of a spiculated right upper lobe pulmonary lesion.  Additional " "irregular focus superior to the lesion in the right lung apex is stable and may represent scar or additional lesion; although this was not hypermetabolic on prior PET-CT.  No new pulmonary lesions. 1.3 cm subcarinal lymph node, not hypermetabolic on prior PET-CT. Stable postsurgical changes of a Whipple procedure. Bilateral nonobstructing nephrolithiasis.  Stable sclerotic focus in the T5 vertebral body, possibly a bone island as this was not hypermetabolic on prior PET-CT. Small hiatal hernia. RECIST SUMMARY: Date of prior examination for comparison 01/30/2018 Lesion 1: Right upper lobe 1.3 cm Series 2 image 31 prior measurement 1.3 cm"  Bone scan also from 6/4/18 revealed no evidence of mets.     Her PET scan from 7/11/18 revealed "Right suprahilar lesion SUV max 3.76, previously 6.86. Pretracheal lymph node SUV max 2.55, previously 3.79. There is physiologic intracranial, head, and neck activity.  There is muscle activation.  There is vocal cord activation.  There is physiologic liver, spleen, GI and  activity.  There is a hiatal hernia.  Pelvic organs show nothing unusual.  No bone lesions are seen.  There are areas of benign appearing lung scar"  She notes fatigue.  She denies any nausea, vomiting, diarrhea, constipation, abdominal pain, weight loss or loss of appetite, chest pain, shortness of breath, leg swelling, fatigue, pain, headache, dizziness, or mood changes. Her ECOG PS is 2. She is accompanied by her  and son     She has been on Tarceva since 6/5/18    HPIShe feels well and come sin to review labs.   She denies any new complaints. PET scan from 7/18 revealed "Improvement right hilar lymph node and pretracheal lymph node. Index right suprahilar lymph node SUV max 3.76, previously 6.86. Pretracheal lymph node index SUV max 2.55, previously 3.79"      Review of Systems   Constitutional: Negative for appetite change, fatigue and unexpected weight change.   HENT: Negative for mouth sores.  "   Eyes: Negative for visual disturbance.   Respiratory: Negative for cough and shortness of breath.    Cardiovascular: Negative for chest pain.   Gastrointestinal: Negative for abdominal pain and diarrhea.   Genitourinary: Negative for frequency.   Musculoskeletal: Negative for back pain.   Skin: Negative for rash.   Neurological: Negative for headaches.   Hematological: Negative for adenopathy.   Psychiatric/Behavioral: The patient is not nervous/anxious.    All other systems reviewed and are negative.      PMFSH: all information reviewed and updated as relevant to today's visit  Objective:      Physical Exam   Constitutional: She is oriented to person, place, and time. She appears well-developed and well-nourished.   HENT:   Mouth/Throat: No oropharyngeal exudate.   Cardiovascular: Normal rate and normal heart sounds.   Pulmonary/Chest: Effort normal and breath sounds normal. She has no wheezes.   Abdominal: Soft. Bowel sounds are normal. There is no tenderness.   Musculoskeletal: She exhibits no edema or tenderness.   Lymphadenopathy:     She has no cervical adenopathy.   Neurological: She is alert and oriented to person, place, and time. Coordination normal.   Skin: Skin is warm and dry. No rash noted.   Psychiatric: She has a normal mood and affect. Judgment and thought content normal.   Vitals reviewed.        LABS:  WBC   Date Value Ref Range Status   08/21/2018 8.28 3.90 - 12.70 K/uL Final     Hemoglobin   Date Value Ref Range Status   08/21/2018 10.7 (L) 12.0 - 16.0 g/dL Final     POC Hematocrit   Date Value Ref Range Status   01/12/2016 25 (L) 36 - 54 %PCV Final     Hematocrit   Date Value Ref Range Status   08/21/2018 35.2 (L) 37.0 - 48.5 % Final     Platelets   Date Value Ref Range Status   08/21/2018 210 150 - 350 K/uL Final     Gran # (ANC)   Date Value Ref Range Status   08/21/2018 5.0 1.8 - 7.7 K/uL Final     Comment:     The ANC is based on a white cell differential from an   automated cell counter.  It has not been microscopically   reviewed for the presence of abnormal cells. Clinical   correlation is required.         Chemistry        Component Value Date/Time     08/21/2018 0937    K 3.9 08/21/2018 0937     (H) 08/21/2018 0937    CO2 22 (L) 08/21/2018 0937    BUN 25 (H) 08/21/2018 0937    CREATININE 1.5 (H) 08/21/2018 0937     08/21/2018 0937        Component Value Date/Time    CALCIUM 8.4 (L) 08/21/2018 0937    ALKPHOS 89 08/21/2018 0937    AST 33 08/21/2018 0937    ALT 22 08/21/2018 0937    BILITOT 1.5 (H) 08/21/2018 0937    ESTGFRAFRICA 39.6 (A) 08/21/2018 0937    EGFRNONAA 34.3 (A) 08/21/2018 0937          Assessment:       1. Malignant neoplasm of lower lobe of right lung    2. Brain metastases    3. Secondary cancer of bone        Plan:        1,2,3. She will continue with Tarceva and Xgeva and will return in 4 weeks with PET scans and also for Xgeva    Above care plan was discussed with patient and accompanying  and all questions were addressed to their satisfaction

## 2018-08-21 NOTE — NURSING
Pt arrived for xgeva following MD appt.  Labs reviewed with pt.  Pt tolerated injection SQ to RLA.  Discharged to home with .

## 2018-08-30 DIAGNOSIS — I27.82 OTHER CHRONIC PULMONARY EMBOLISM WITHOUT ACUTE COR PULMONALE: Primary | ICD-10-CM

## 2018-08-30 NOTE — TELEPHONE ENCOUNTER
----- Message from Josie Bryant sent at 8/30/2018 12:07 PM CDT -----  Contact: Mark from [facility] can be reached at  [phone]  Pharmacy is calling for a refill apixaban (ELIQUIS) 5 mg Tab      Thank you!

## 2018-09-05 ENCOUNTER — PES CALL (OUTPATIENT)
Dept: ADMINISTRATIVE | Facility: CLINIC | Age: 73
End: 2018-09-05

## 2018-09-13 ENCOUNTER — HOSPITAL ENCOUNTER (OUTPATIENT)
Dept: RADIOLOGY | Facility: HOSPITAL | Age: 73
Discharge: HOME | End: 2018-09-13
Attending: INTERNAL MEDICINE
Payer: MEDICARE

## 2018-09-13 DIAGNOSIS — C34.31 MALIGNANT NEOPLASM OF LOWER LOBE OF RIGHT LUNG: ICD-10-CM

## 2018-09-13 LAB — POCT GLUCOSE: 82 MG/DL (ref 70–110)

## 2018-09-13 PROCEDURE — 78815 PET IMAGE W/CT SKULL-THIGH: CPT | Mod: PS,TC

## 2018-09-13 PROCEDURE — 78815 PET IMAGE W/CT SKULL-THIGH: CPT | Mod: 26,PS,, | Performed by: RADIOLOGY

## 2018-09-13 PROCEDURE — A9552 F18 FDG: HCPCS

## 2018-09-18 ENCOUNTER — INFUSION (OUTPATIENT)
Dept: INFUSION THERAPY | Facility: HOSPITAL | Age: 73
DRG: 389 | End: 2018-09-18
Attending: INTERNAL MEDICINE
Payer: MEDICARE

## 2018-09-18 ENCOUNTER — OFFICE VISIT (OUTPATIENT)
Dept: HEMATOLOGY/ONCOLOGY | Facility: CLINIC | Age: 73
DRG: 389 | End: 2018-09-18
Payer: MEDICARE

## 2018-09-18 VITALS
WEIGHT: 140.44 LBS | BODY MASS INDEX: 22.57 KG/M2 | HEART RATE: 61 BPM | DIASTOLIC BLOOD PRESSURE: 72 MMHG | HEIGHT: 66 IN | SYSTOLIC BLOOD PRESSURE: 160 MMHG | TEMPERATURE: 98 F | OXYGEN SATURATION: 100 % | RESPIRATION RATE: 20 BRPM

## 2018-09-18 DIAGNOSIS — C79.51 SECONDARY CANCER OF BONE: ICD-10-CM

## 2018-09-18 DIAGNOSIS — C34.31 MALIGNANT NEOPLASM OF LOWER LOBE OF RIGHT LUNG: Primary | ICD-10-CM

## 2018-09-18 DIAGNOSIS — D50.0 ANEMIA DUE TO CHRONIC BLOOD LOSS: ICD-10-CM

## 2018-09-18 PROCEDURE — 99215 OFFICE O/P EST HI 40 MIN: CPT | Mod: S$PBB,,, | Performed by: INTERNAL MEDICINE

## 2018-09-18 PROCEDURE — 3077F SYST BP >= 140 MM HG: CPT | Mod: CPTII,,, | Performed by: INTERNAL MEDICINE

## 2018-09-18 PROCEDURE — 99999 PR PBB SHADOW E&M-EST. PATIENT-LVL IV: CPT | Mod: PBBFAC,,, | Performed by: INTERNAL MEDICINE

## 2018-09-18 PROCEDURE — 63600175 PHARM REV CODE 636 W HCPCS: Mod: JG | Performed by: INTERNAL MEDICINE

## 2018-09-18 PROCEDURE — 96372 THER/PROPH/DIAG INJ SC/IM: CPT

## 2018-09-18 PROCEDURE — 3078F DIAST BP <80 MM HG: CPT | Mod: CPTII,,, | Performed by: INTERNAL MEDICINE

## 2018-09-18 PROCEDURE — 1101F PT FALLS ASSESS-DOCD LE1/YR: CPT | Mod: CPTII,,, | Performed by: INTERNAL MEDICINE

## 2018-09-18 PROCEDURE — 99214 OFFICE O/P EST MOD 30 MIN: CPT | Mod: PBBFAC,25 | Performed by: INTERNAL MEDICINE

## 2018-09-18 RX ADMIN — DENOSUMAB 120 MG: 120 INJECTION SUBCUTANEOUS at 11:09

## 2018-09-18 NOTE — PROGRESS NOTES
"Subjective:       Patient ID: Naty St is a 73 y.o. female.    Chief Complaint: Malignant neoplasm of lower lobe of right lung  Oncologic History:  Ms. Naty St was admitted to the hospital between 01/25/2016 and 01/28/2016. She has a history of pancreatic cancer 17 years ago, breast cancer and meningioma, presented to the hospital complaining of loss of consciousness. The patient apparently was in her normal state of health and she stood up to go to the bathroom and fell to the floor. The patient's daughter helped the mother up in the bathroom and noted that her mother's upper extremities were shaking and eye rolling. No reports of bowel or bladder incontinence, tongue biting or rolling. The second episode lasted about four minutes and she was extremely lethargic following that. Apparently, the patient had a meningioma resection done in the past; however, recently, underwent neurosurgical resection with Dr. 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    Additional immunohistochemical stains were performed which revealed the tumor cells to be are positive for TTF1 and negative for ER and GCDFP. The morphology and TTF1 positivity are most consistent with lung primary.    Of note, imaging scan at the end of January 2016 revealed a mass in the lung at 2 cm in the medial aspect of the apical segment of the right upper lobe abutting the mediastinum at the level of the azygous vein and abutting and possibly encasing the segmental bronchi and vessels of the apical segment of the right upper lobe and no pleural fluid was present. Also, there is an enlarged right paratracheal lymph node. No evidence of any metastatic disease at the pancreatic site with postoperative changes post Whipple disease  Her PET Scan from 2/15/16 reveal "Hypermetabolic mass in the right lung apex consistent with a " "primary malignancy. Hypermetabolic mediastinal lymph nodes consistent with metastatic disease. Right sacral hypermetabolic lesion consistent with metastatic disease, noting additional mildly sclerotic lesions in multiple vertebral bodies which do not demonstrate abnormal hypermetabolism  She underwent IR bone biopsy which revealed metastatic adenocarcinoma of lung origin. 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  She has completed focal RT to brain lesions in March 2016.    PET scan from 6/6/16 shows "Dramatic almost complete response to therapy."  Lovenox started after US lower ext 6/7/16 shows Acute complete occlusion of one of the left posterior tibial vein.Remote partial thrombus of the proximal left superficial femoral vein.  She is on Tarceva.   12/6/16 PET scan reveals "Stable right upper lobe lesion and right hilar lymph node. No new lesions identified. Trace left pleural effusion".  1/27/17 Renal u/s "Medical renal disease. Nonobstructive right nephrolithiasis"  1/31/17 PET - "Right upper lobe nodule and right hilar lymph node similar and very low grade activity.  There is no definite evidence of recurrence."   11/9/17 PET "In this patient with history of lung cancer, there is interval increase in size and hypermetabolism of a right upper lobe lung lesion concerning for recurrent disease. Additionally, there is interval appearance of a hypermetabolic paratracheal lymph node suspicious for metastatic disease"                 She progressed on Tarceva She underwent EBUS on 12/5/17. Pathology revealed adenocarcinoma but T790m could not be done as quantity was not insufficient. Her PET scan from 1/30/18 revealed The patient's previously identified abnormal lesions is slightly greater uptake of FDG on today's study compared with prior exam. These findings are concerning for progression of disease"      She was hospitalized between 4/9/18-4/16/18. Her hospital course was as follows "Patient was admitted to " "hemonc service on 4/9 with acute hypoxic respiratory failure. She was requiring 4 L of nc on admission. She was started on moxi for CAP. She received one dose of ceftriaxone in the ED. On 2nd day of admission she spiked a fever. Her Hb was ~7 g/dl so she was transfused 1 unit of PRBCs. Over night she developed worsening of her symptoms with increased O2 requirements to 15 L HFNC. Her CXR showed worsening congestion. There was a concern of TRALI vs TACO. New 2D echo with diastolic dysfunction. She was given IV lasix pushes as needed and was started on dexamethasone.  Her abx was escalated to vanc and cefepime. She improved with diuresis and steroids. Pulmonary were consulted. Her O2 requirements continued to improve. On 4/15 she was switched to Augmentin. PT recommended discharging her to SNF but the patient refused and she wanted to go home with home health. She qualified for home oxygen so she was discharged on 3 L nc while at rest and 6 while active. Augmentin and dexamethasone were discontinued on discharge"     She was readmitted from 4/27 to 5/3/18 with who presented to the ED with a complaint of fatigue and worsening DELA CRUZ. Pt diagnosed PNA 4/9 and was discharged on 4/16 on 3L oxygen. She was initially placed on cefepime for 3 days followed by an add'l 2 days of Augmentin. Pulm was consulted with assistance as her oxygen requirements were increasing. She was placed on oral steroids, then trailed on 80mg TID solumedrol for 2 days and will be discharged on a prednisone taper. She was also diuresed with 2 days of 40mg IV lasix with appropriate UOP resulting, improvement on repeat CXR. She was medically stable and appropriate for DC home with home health on 1L continuous O2. She will be seen for close f/u and may be able to come off oxygen at that time.      She discontinued Tagrisso in April 2018. Restaging scans from 6/4/18 revealed "Stable appearance of a spiculated right upper lobe pulmonary lesion.  Additional " "irregular focus superior to the lesion in the right lung apex is stable and may represent scar or additional lesion; although this was not hypermetabolic on prior PET-CT.  No new pulmonary lesions. 1.3 cm subcarinal lymph node, not hypermetabolic on prior PET-CT. Stable postsurgical changes of a Whipple procedure. Bilateral nonobstructing nephrolithiasis.  Stable sclerotic focus in the T5 vertebral body, possibly a bone island as this was not hypermetabolic on prior PET-CT. Small hiatal hernia. RECIST SUMMARY: Date of prior examination for comparison 01/30/2018 Lesion 1: Right upper lobe 1.3 cm Series 2 image 31 prior measurement 1.3 cm"  Bone scan also from 6/4/18 revealed no evidence of mets.     Her PET scan from 7/11/18 revealed "Right suprahilar lesion SUV max 3.76, previously 6.86. Pretracheal lymph node SUV max 2.55, previously 3.79. There is physiologic intracranial, head, and neck activity.  There is muscle activation.  There is vocal cord activation.  There is physiologic liver, spleen, GI and  activity.  There is a hiatal hernia.  Pelvic organs show nothing unusual.  No bone lesions are seen.  There are areas of benign appearing lung scar"  She notes fatigue.  She denies any nausea, vomiting, diarrhea, constipation, abdominal pain, weight loss or loss of appetite, chest pain, shortness of breath, leg swelling, fatigue, pain, headache, dizziness, or mood changes. Her ECOG PS is 2. She is accompanied by her  and son     She has been on Tarceva since 6/5/18       HPI She feels well and comes in to review labs and PET scan which reveals "Stable appearance and FDG avidity of the paratracheal and right suprahilar lymph node. No new areas of abnormal radiotracer uptake"  She feels well and denies any new complaints    Review of Systems   Constitutional: Negative for appetite change, fatigue and unexpected weight change.   HENT: Negative for mouth sores.    Eyes: Negative for visual disturbance. "   Respiratory: Negative for cough and shortness of breath.    Cardiovascular: Negative for chest pain.   Gastrointestinal: Negative for abdominal pain and diarrhea.   Genitourinary: Negative for frequency.   Musculoskeletal: Negative for back pain.   Skin: Negative for rash.   Neurological: Negative for headaches.   Hematological: Negative for adenopathy.   Psychiatric/Behavioral: The patient is not nervous/anxious.    All other systems reviewed and are negative.      Objective:      Physical Exam   Constitutional: She is oriented to person, place, and time. She appears well-developed and well-nourished.   HENT:   Mouth/Throat: No oropharyngeal exudate.   Cardiovascular: Normal rate and normal heart sounds.   Pulmonary/Chest: Effort normal and breath sounds normal. She has no wheezes.   Abdominal: Soft. Bowel sounds are normal. There is no tenderness.   Musculoskeletal: She exhibits no edema or tenderness.   Lymphadenopathy:     She has no cervical adenopathy.   Neurological: She is alert and oriented to person, place, and time. Coordination normal.   Skin: Skin is warm and dry. No rash noted.   Psychiatric: She has a normal mood and affect. Judgment and thought content normal.   Vitals reviewed.      LABS:  WBC   Date Value Ref Range Status   09/18/2018 5.05 3.90 - 12.70 K/uL Final     Hemoglobin   Date Value Ref Range Status   09/18/2018 11.6 (L) 12.0 - 16.0 g/dL Final     POC Hematocrit   Date Value Ref Range Status   01/12/2016 25 (L) 36 - 54 %PCV Final     Hematocrit   Date Value Ref Range Status   09/18/2018 38.3 37.0 - 48.5 % Final     Platelets   Date Value Ref Range Status   09/18/2018 229 150 - 350 K/uL Final     Gran # (ANC)   Date Value Ref Range Status   09/18/2018 2.6 1.8 - 7.7 K/uL Final     Comment:     The ANC is based on a white cell differential from an   automated cell counter. It has not been microscopically   reviewed for the presence of abnormal cells. Clinical   correlation is required.          Chemistry        Component Value Date/Time     09/18/2018 1011    K 3.9 09/18/2018 1011     (H) 09/18/2018 1011    CO2 22 (L) 09/18/2018 1011    BUN 21 09/18/2018 1011    CREATININE 1.4 09/18/2018 1011     (H) 09/18/2018 1011        Component Value Date/Time    CALCIUM 8.5 (L) 09/18/2018 1011    ALKPHOS 167 (H) 09/18/2018 1011    AST 48 (H) 09/18/2018 1011     (H) 09/18/2018 1011    BILITOT 1.8 (H) 09/18/2018 1011    ESTGFRAFRICA 42.7 (A) 09/18/2018 1011    EGFRNONAA 37.0 (A) 09/18/2018 1011          Assessment:       1. Malignant neoplasm of lower lobe of right lung    2. Secondary cancer of bone    3. Anemia due to chronic blood loss        Plan:        1,2,3. She will continue with Tarceva and Xgeva and will return in 4 weeks for next cycle.   Will plan in changing Xgeva to every 3 months   3. Schedule colonoscopy    Above care plan was discussed with patient and accompanying wife and all questions were addressed to their satisfaction

## 2018-09-21 ENCOUNTER — HOSPITAL ENCOUNTER (INPATIENT)
Facility: HOSPITAL | Age: 73
LOS: 3 days | Discharge: HOME | DRG: 389 | End: 2018-09-24
Attending: EMERGENCY MEDICINE | Admitting: INTERNAL MEDICINE
Payer: MEDICARE

## 2018-09-21 ENCOUNTER — OFFICE VISIT (OUTPATIENT)
Dept: INTERNAL MEDICINE | Facility: CLINIC | Age: 73
DRG: 389 | End: 2018-09-21
Payer: MEDICARE

## 2018-09-21 VITALS
HEIGHT: 65 IN | DIASTOLIC BLOOD PRESSURE: 86 MMHG | OXYGEN SATURATION: 98 % | SYSTOLIC BLOOD PRESSURE: 152 MMHG | BODY MASS INDEX: 22.08 KG/M2 | HEART RATE: 93 BPM | WEIGHT: 132.5 LBS | TEMPERATURE: 98 F

## 2018-09-21 DIAGNOSIS — R10.9 ABDOMINAL PAIN, UNSPECIFIED ABDOMINAL LOCATION: Primary | ICD-10-CM

## 2018-09-21 DIAGNOSIS — K56.609 SBO (SMALL BOWEL OBSTRUCTION): Primary | ICD-10-CM

## 2018-09-21 DIAGNOSIS — R10.84 GENERALIZED ABDOMINAL PAIN: ICD-10-CM

## 2018-09-21 DIAGNOSIS — N18.30 STAGE 3 CHRONIC KIDNEY DISEASE: ICD-10-CM

## 2018-09-21 DIAGNOSIS — F41.9 ANXIETY: ICD-10-CM

## 2018-09-21 DIAGNOSIS — C34.31 MALIGNANT NEOPLASM OF LOWER LOBE OF RIGHT LUNG: ICD-10-CM

## 2018-09-21 DIAGNOSIS — Z86.718 HISTORY OF DEEP VEIN THROMBOSIS (DVT) OF LOWER EXTREMITY: ICD-10-CM

## 2018-09-21 DIAGNOSIS — I10 ESSENTIAL HYPERTENSION: ICD-10-CM

## 2018-09-21 PROBLEM — G47.00 INSOMNIA: Status: ACTIVE | Noted: 2018-09-21

## 2018-09-21 LAB
ALBUMIN SERPL BCP-MCNC: 3.2 G/DL
ALP SERPL-CCNC: 145 U/L
ALT SERPL W/O P-5'-P-CCNC: 55 U/L
ANION GAP SERPL CALC-SCNC: 10 MMOL/L
AST SERPL-CCNC: 37 U/L
BASOPHILS # BLD AUTO: 0.03 K/UL
BASOPHILS NFR BLD: 0.3 %
BILIRUB SERPL-MCNC: 2.3 MG/DL
BUN SERPL-MCNC: 21 MG/DL
CALCIUM SERPL-MCNC: 9 MG/DL
CHLORIDE SERPL-SCNC: 110 MMOL/L
CO2 SERPL-SCNC: 25 MMOL/L
CREAT SERPL-MCNC: 1.4 MG/DL
DIFFERENTIAL METHOD: ABNORMAL
EOSINOPHIL # BLD AUTO: 0 K/UL
EOSINOPHIL NFR BLD: 0.2 %
ERYTHROCYTE [DISTWIDTH] IN BLOOD BY AUTOMATED COUNT: 13.7 %
EST. GFR  (AFRICAN AMERICAN): 42.7 ML/MIN/1.73 M^2
EST. GFR  (NON AFRICAN AMERICAN): 37 ML/MIN/1.73 M^2
GLUCOSE SERPL-MCNC: 110 MG/DL
HCT VFR BLD AUTO: 42.6 %
HGB BLD-MCNC: 13.4 G/DL
IMM GRANULOCYTES # BLD AUTO: 0.03 K/UL
IMM GRANULOCYTES NFR BLD AUTO: 0.3 %
LACTATE SERPL-SCNC: 1.7 MMOL/L
LYMPHOCYTES # BLD AUTO: 1.9 K/UL
LYMPHOCYTES NFR BLD: 20.8 %
MCH RBC QN AUTO: 29.8 PG
MCHC RBC AUTO-ENTMCNC: 31.5 G/DL
MCV RBC AUTO: 95 FL
MONOCYTES # BLD AUTO: 0.8 K/UL
MONOCYTES NFR BLD: 9.2 %
NEUTROPHILS # BLD AUTO: 6.3 K/UL
NEUTROPHILS NFR BLD: 69.2 %
NRBC BLD-RTO: 0 /100 WBC
PLATELET # BLD AUTO: 245 K/UL
PMV BLD AUTO: 9.9 FL
POTASSIUM SERPL-SCNC: 3.7 MMOL/L
PROT SERPL-MCNC: 6.3 G/DL
RBC # BLD AUTO: 4.5 M/UL
SODIUM SERPL-SCNC: 145 MMOL/L
WBC # BLD AUTO: 9.12 K/UL

## 2018-09-21 PROCEDURE — 96361 HYDRATE IV INFUSION ADD-ON: CPT

## 2018-09-21 PROCEDURE — 20600001 HC STEP DOWN PRIVATE ROOM

## 2018-09-21 PROCEDURE — 25000003 PHARM REV CODE 250: Performed by: STUDENT IN AN ORGANIZED HEALTH CARE EDUCATION/TRAINING PROGRAM

## 2018-09-21 PROCEDURE — 99999 PR PBB SHADOW E&M-EST. PATIENT-LVL V: CPT | Mod: PBBFAC,,, | Performed by: INTERNAL MEDICINE

## 2018-09-21 PROCEDURE — 63600175 PHARM REV CODE 636 W HCPCS: Performed by: STUDENT IN AN ORGANIZED HEALTH CARE EDUCATION/TRAINING PROGRAM

## 2018-09-21 PROCEDURE — 3077F SYST BP >= 140 MM HG: CPT | Mod: CPTII,,, | Performed by: INTERNAL MEDICINE

## 2018-09-21 PROCEDURE — 84100 ASSAY OF PHOSPHORUS: CPT

## 2018-09-21 PROCEDURE — 99213 OFFICE O/P EST LOW 20 MIN: CPT | Mod: S$PBB,,, | Performed by: INTERNAL MEDICINE

## 2018-09-21 PROCEDURE — 1101F PT FALLS ASSESS-DOCD LE1/YR: CPT | Mod: CPTII,,, | Performed by: INTERNAL MEDICINE

## 2018-09-21 PROCEDURE — 83605 ASSAY OF LACTIC ACID: CPT

## 2018-09-21 PROCEDURE — 12000002 HC ACUTE/MED SURGE SEMI-PRIVATE ROOM

## 2018-09-21 PROCEDURE — 99284 EMERGENCY DEPT VISIT MOD MDM: CPT | Mod: ,,, | Performed by: NURSE PRACTITIONER

## 2018-09-21 PROCEDURE — 99285 EMERGENCY DEPT VISIT HI MDM: CPT | Mod: 25,27

## 2018-09-21 PROCEDURE — 99215 OFFICE O/P EST HI 40 MIN: CPT | Mod: PBBFAC,25 | Performed by: INTERNAL MEDICINE

## 2018-09-21 PROCEDURE — 83735 ASSAY OF MAGNESIUM: CPT

## 2018-09-21 PROCEDURE — 3079F DIAST BP 80-89 MM HG: CPT | Mod: CPTII,,, | Performed by: INTERNAL MEDICINE

## 2018-09-21 PROCEDURE — 85025 COMPLETE CBC W/AUTO DIFF WBC: CPT | Mod: 91

## 2018-09-21 PROCEDURE — 96374 THER/PROPH/DIAG INJ IV PUSH: CPT

## 2018-09-21 PROCEDURE — 80053 COMPREHEN METABOLIC PANEL: CPT | Mod: 91

## 2018-09-21 PROCEDURE — 25000003 PHARM REV CODE 250: Performed by: NURSE PRACTITIONER

## 2018-09-21 RX ORDER — ONDANSETRON 2 MG/ML
4 INJECTION INTRAMUSCULAR; INTRAVENOUS EVERY 6 HOURS PRN
Status: DISCONTINUED | OUTPATIENT
Start: 2018-09-22 | End: 2018-09-24

## 2018-09-21 RX ORDER — SODIUM CHLORIDE 0.9 % (FLUSH) 0.9 %
5 SYRINGE (ML) INJECTION
Status: DISCONTINUED | OUTPATIENT
Start: 2018-09-21 | End: 2018-09-24 | Stop reason: HOSPADM

## 2018-09-21 RX ORDER — IBUPROFEN 200 MG
24 TABLET ORAL
Status: DISCONTINUED | OUTPATIENT
Start: 2018-09-21 | End: 2018-09-24 | Stop reason: HOSPADM

## 2018-09-21 RX ORDER — HEPARIN SODIUM,PORCINE/D5W 25000/250
18 INTRAVENOUS SOLUTION INTRAVENOUS CONTINUOUS
Status: DISCONTINUED | OUTPATIENT
Start: 2018-09-22 | End: 2018-09-22

## 2018-09-21 RX ORDER — LEVETIRACETAM 5 MG/ML
500 INJECTION INTRAVASCULAR EVERY 12 HOURS
Status: DISCONTINUED | OUTPATIENT
Start: 2018-09-21 | End: 2018-09-23

## 2018-09-21 RX ORDER — LABETALOL HYDROCHLORIDE 5 MG/ML
10 INJECTION, SOLUTION INTRAVENOUS EVERY 4 HOURS PRN
Status: DISCONTINUED | OUTPATIENT
Start: 2018-09-21 | End: 2018-09-24 | Stop reason: HOSPADM

## 2018-09-21 RX ORDER — GLUCAGON 1 MG
1 KIT INJECTION
Status: DISCONTINUED | OUTPATIENT
Start: 2018-09-21 | End: 2018-09-24 | Stop reason: HOSPADM

## 2018-09-21 RX ORDER — IBUPROFEN 200 MG
16 TABLET ORAL
Status: DISCONTINUED | OUTPATIENT
Start: 2018-09-21 | End: 2018-09-24 | Stop reason: HOSPADM

## 2018-09-21 RX ADMIN — SODIUM CHLORIDE 1000 ML: 0.9 INJECTION, SOLUTION INTRAVENOUS at 06:09

## 2018-09-21 RX ADMIN — LEVETIRACETAM 500 MG: 5 INJECTION INTRAVENOUS at 11:09

## 2018-09-21 RX ADMIN — LABETALOL HYDROCHLORIDE 10 MG: 5 INJECTION, SOLUTION INTRAVENOUS at 11:09

## 2018-09-21 NOTE — PROGRESS NOTES
Subjective:       Patient ID: Naty St is a 73 y.o. female.    Chief Complaint: Abdominal Pain    Here for follow up of ED visit that ended about 15 hours ago.  She has bad abd pain, worse this afternoon than when she left the ED.  Has history of pancreatic cancer and whipple, breast ca, and now metastatic lung ca.  Was able to have a small BM this am but again is in terrible pain      Review of Systems   Constitutional: Negative for activity change, chills, fatigue and fever.   HENT: Negative for congestion, ear pain, nosebleeds, postnasal drip, sinus pressure and sore throat.    Eyes: Negative.  Negative for visual disturbance.   Respiratory: Negative for cough, chest tightness, shortness of breath and wheezing.    Cardiovascular: Negative for chest pain.   Gastrointestinal: Negative for abdominal pain, diarrhea, nausea and vomiting.   Genitourinary: Negative for difficulty urinating, dysuria, frequency and urgency.   Musculoskeletal: Negative for arthralgias and neck stiffness.   Skin: Negative for rash.   Neurological: Negative for dizziness, weakness and headaches.   Psychiatric/Behavioral: Negative for sleep disturbance. The patient is not nervous/anxious.        Objective:      Physical Exam   Constitutional: She is oriented to person, place, and time. She appears well-developed and well-nourished.  Non-toxic appearance. She appears ill. She appears distressed.       HENT:   Head: Normocephalic and atraumatic.   Right Ear: Tympanic membrane, external ear and ear canal normal.   Left Ear: Tympanic membrane, external ear and ear canal normal.   Eyes: EOM are normal. Pupils are equal, round, and reactive to light. No scleral icterus.   Neck: Normal range of motion. Neck supple. No thyromegaly present.   Cardiovascular: Regular rhythm and normal heart sounds. Tachycardia present.   Pulmonary/Chest: Effort normal and breath sounds normal.   Abdominal: Soft. She exhibits no mass. Bowel sounds are  decreased. There is generalized tenderness. There is no rebound.   Musculoskeletal: Normal range of motion.   Lymphadenopathy:     She has no cervical adenopathy.   Neurological: She is alert and oriented to person, place, and time. She has normal reflexes. She displays normal reflexes. No cranial nerve deficit. She exhibits normal muscle tone. Coordination normal.   Skin: Skin is warm and dry.   Psychiatric: She has a normal mood and affect. Her behavior is normal.       Assessment:       1. Abdominal pain, unspecified abdominal location        Plan:   Naty was seen today for abdominal pain.    Diagnoses and all orders for this visit:    Abdominal pain, unspecified abdominal location  -     Refer to Emergency Dept.

## 2018-09-21 NOTE — ED TRIAGE NOTES
Returning to ER with continued abdominal pain, nausea and vomiting.  Has not yet started the medication she was given last pm.  Patient's name and date of birth checked and is correct.    LOC: The patient is awake, alert, and oriented to place, time, situation. Affect is appropriate.  Speech is appropriate and clear.      APPEARANCE: Patient resting comfortably, reporting palpation, light headedness,  in no acute distress.  Patient is clean and well groomed.     SKIN: The skin is warm and dry; color consistent with ethnicity.  Patient has normal skin turgor and moist mucus membranes.  Skin intact; no breakdown or bruising noted.      MUSCULOSKELETAL: Patient moving upper and lower extremities without difficulty.  Denies weakness.      RESPIRATORY: Airway is open and patent. Respirations spontaneous, even, easy, and non-labored.  Patient has a normal effort and rate.  No accessory muscle use noted. Denies cough.  BS clear.     CARDIAC:  No peripheral edema noted. No complaints of chest pain.       ABDOMEN: Soft and non tender to palpation.  No distention noted.      NEUROLOGIC: Eyes open spontaneously.  Behavior appropriate to situation.  Follows commands; facial expression symmetrical.  Purposeful motor response noted; normal sensation in all extremities.

## 2018-09-21 NOTE — ED PROVIDER NOTES
"Encounter Date: 9/21/2018       History     Chief Complaint   Patient presents with    small bowel obstruction     seen here last night, dx'd w/ sm bowel obstruction. Seen in  IM clinic DR Wilkinson today , brought to ER  by clinic staff for eval of increased pain . Having n/v.      Patient is a 73-year-old female with medical history of breast cancer, lung cancer, Mets to the brain, pancreatic cancer, seizures, stroke presenting to the ED for abdominal pain and vomiting. Patient states she was seen in the ED last night and CT was concerning for small bowel obstruction.  Patient states she was sent home to follow up with surgery.  Patient states today she follow up with her PCP and had increased pain and vomiting. Patient denies any fever, chills, chest pain or shortness of breath.          Review of patient's allergies indicates:   Allergen Reactions    Tobradex [tobramycin-dexamethasone] Swelling    Iodinated contrast- oral and iv dye Hives    Morphine Other (See Comments)     "shaking" and tremors    Latex Rash    Phenytoin sodium extended Other (See Comments) and Rash     Past Medical History:   Diagnosis Date    Anticoagulant long-term use     Blood clot in vein 06/2016    Breast cancer 1994    Cataract     Hypertension     Pancreatic cancer 1998    Posterior capsular opacification, left eye     Psychiatric problem     Seizures 01/25/2016    Stroke     Therapy      Past Surgical History:   Procedure Laterality Date    UIXLIL-KUVPUX-RA N/A 3/9/2016    Performed by [facility] at [facility] OR 2ND FLR    BONE BIOSPY  3/9/16    BRAIN SURGERY  1/12/16    tumor removal 1/12/16    BREAST LUMPECTOMY  1998    left    BRONCHOSCOPY N/A 12/5/2017    Performed by Kiya Zhang [facility] at [facility] OR 2ND FLR    CATARACT EXTRACTION Bilateral 2004    yag OU    CHOLECYSTECTOMY  1998    COLONOSCOPY N/A 11/13/2015    Procedure: COLONOSCOPY;  Surgeon: Alex Carmona MD;  Location: [facility] ENDO (4TH FLR);  " Service: Endoscopy;  Laterality: N/A;  2 year f/u    COLONOSCOPY N/A 2015    Performed [facility] at [facility] ENDO (4TH FLR)    COLONOSCOPY N/A 2013    Performed by Gavin Prasad MD at [facility] ENDO (4TH FLR)    CRANIOTOMY WITH STEALTH Left 2016    Performed by Salty Ferrera MD at [facility] OR 2ND FLR    cysto and right ureteral stent  12    ecoli      removal of blockage, done throat, then through the liver.    ENDOBRONCHIAL ULTRASOUND (EBUS) N/A 2017    Performed by [facility] Zhang [facility] at [facility] OR 2ND FLR    ESOPHAGOGASTRODUODENOSCOPY (EGD) N/A 3/14/2018    Performed by Alex Carmona MD at [facility] ENDO (4TH FLR)    HYSTERECTOMY  1974    KIDNEY STONE SURGERY  --laser    left eswl  12    OOPHORECTOMY  2012    Laparoscopic BSO, lysis of adhesions, cystoscopy greater than 35mins     WHIPPLE PROCEDURE W/ LAPAROSCOPY       Family History   Problem Relation Age of Onset    Breast cancer Mother     Lung cancer Mother     Leukemia Paternal Grandfather     Stomach cancer Paternal Grandmother      Social History     Tobacco Use    Smoking status: Former Smoker     Packs/day: 0.00     Years: 0.00     Pack years: 0.00     Last attempt to quit: 1961     Years since quittin.0    Smokeless tobacco: Never Used   Substance Use Topics    Alcohol use: No    Drug use: No     Review of Systems   Constitutional: Negative for chills and fever.   Respiratory: Negative for shortness of breath.    Cardiovascular: Negative for chest pain.   Gastrointestinal: Positive for abdominal pain, constipation, nausea and vomiting. Negative for diarrhea.   Neurological: Negative for dizziness, syncope, weakness, numbness and headaches.       Physical Exam     Initial Vitals [18 1739]   BP Pulse Resp Temp SpO2   (!) 165/88 99 18 98.6 °F (37 °C) 99 %      MAP       --         Physical Exam    Nursing note and vitals reviewed.  Constitutional: Vital signs are  normal. She appears well-developed and well-nourished. She is cooperative. She does not have a sickly appearance. She does not appear ill. No distress.   HENT:   Head: Normocephalic and atraumatic.   Eyes: EOM are normal. Pupils are equal, round, and reactive to light.   Neck: Trachea normal, normal range of motion, full passive range of motion without pain and phonation normal. Neck supple. No JVD present.   Cardiovascular: Normal rate, regular rhythm, normal heart sounds and intact distal pulses.   Pulses:       Radial pulses are 2+ on the right side, and 2+ on the left side.        Dorsalis pedis pulses are 2+ on the right side, and 2+ on the left side.   Pulmonary/Chest: Effort normal. She has decreased breath sounds.   Abdominal: Soft. Normal appearance. She exhibits no distension. Bowel sounds are decreased. There is no tenderness. There is no rigidity, no rebound and no guarding.   Musculoskeletal: Normal range of motion.   Neurological: She is alert and oriented to person, place, and time. She has normal strength. No cranial nerve deficit or sensory deficit. GCS eye subscore is 4. GCS verbal subscore is 5. GCS motor subscore is 6.   Skin: Skin is warm, dry and intact. Capillary refill takes more than 3 seconds. No abrasion, no laceration and no rash noted. No cyanosis. Nails show no clubbing.   Psychiatric: She has a normal mood and affect. Her speech is normal and behavior is normal. Judgment and thought content normal. Cognition and memory are normal.         ED Course   Procedures  Labs Reviewed - No data to display       Imaging Results          X-Ray Abdomen Flat And Erect (Final result)  Result time 09/21/18 19:48:27    Final result by Everett Granger MD (09/21/18 19:48:27)                 Impression:      Persistent significant gaseous distension of small bowel with large volume fecal loading throughout the colon, suggestive of small bowel obstruction versus ileus.    Electronically signed by  resident: Pablo Adams  Date:    09/21/2018  Time:    19:34    Electronically signed by: Everett Granger MD  Date:    09/21/2018  Time:    19:48             Narrative:    EXAMINATION:  XR ABDOMEN FLAT AND ERECT    CLINICAL HISTORY:  Unspecified intestinal obstruction, unspecified as to partial versus complete obstruction    TECHNIQUE:  Flat and erect AP views of the abdomen were performed.    COMPARISON:  Radiograph 09/21/2018; CT 09/21/2018.    FINDINGS:  There is gaseous distention of the small bowel in the right mid abdomen.  Small bowel caliber measures up to 5 cm.  Moderate fecal loading seen throughout the colon and likely fecalization of small bowel.  No intra-abdominal free air.  No acute osseous process.                                       APC / Resident Notes:   Emergent evaluation of a 74 yo female patient presenting to the ER with chief complaint of abdominal pain, nausea and vomiting over the past few days.  Pt as seen in the ED last night and CT was concerning for SBO.  Pt tolerated PO and pt was sent home.  Pt was seen by PCP and brought to the ED due to increased pain and vomiting. The on exam patient A&O x3.  Cap refill less than 3 sec.  The abdomen is soft in patient denies any tenderness to palpation.  Bowel sounds are diminished.  I will consult General surgery and await recommendations. Differential diagnoses include but are not limited to intestinal ischemia, colitis / diverticulitis, Irritable bowel syndrome, bowel obstruction.  I discussed the care of this patient with my Supervising Physician.      General surgery consulted.  Will be down to see patient.  Requesting abdominal x-ray.    X-ray shows persistent significant gaseous distension of small bowel with large volume fecal loading throughout the colon, suggestive of small bowel obstruction versus ileus.  Received a call from general surgery.  Recommend patient be admitted to heme Onc due to ongoing chemo treatment.  Heme on consulted.   Patient to be admitted to their service for further workup and care.  Patient and family updated on plan of care.  All questions and concerns addressed.           Attending Attestation:     Physician Attestation Statement for NP/PA:   I discussed this assessment and plan of this patient with the NP/PA, but I did not personally examine the patient. The face to face encounter was performed by the NP/PA.                     Clinical Impression:   The encounter diagnosis was SBO (small bowel obstruction).      Disposition:   Disposition: Admitted  Condition: Stable       Valerie Piper NP  09/21/18 2043       Helena George MD  09/22/18 0037

## 2018-09-22 LAB
ALBUMIN SERPL BCP-MCNC: 3.1 G/DL
ALP SERPL-CCNC: 137 U/L
ALT SERPL W/O P-5'-P-CCNC: 53 U/L
ANION GAP SERPL CALC-SCNC: 12 MMOL/L
APTT BLDCRRT: 23.3 SEC
APTT BLDCRRT: 43.6 SEC
APTT BLDCRRT: 47.1 SEC
AST SERPL-CCNC: 34 U/L
BASOPHILS # BLD AUTO: 0.02 K/UL
BASOPHILS NFR BLD: 0.2 %
BILIRUB SERPL-MCNC: 2.2 MG/DL
BUN SERPL-MCNC: 20 MG/DL
CALCIUM SERPL-MCNC: 8.8 MG/DL
CHLORIDE SERPL-SCNC: 111 MMOL/L
CO2 SERPL-SCNC: 22 MMOL/L
CREAT SERPL-MCNC: 1.3 MG/DL
DIFFERENTIAL METHOD: ABNORMAL
EOSINOPHIL # BLD AUTO: 0 K/UL
EOSINOPHIL NFR BLD: 0.3 %
ERYTHROCYTE [DISTWIDTH] IN BLOOD BY AUTOMATED COUNT: 14 %
EST. GFR  (AFRICAN AMERICAN): 46.7 ML/MIN/1.73 M^2
EST. GFR  (NON AFRICAN AMERICAN): 40.5 ML/MIN/1.73 M^2
ESTIMATED AVG GLUCOSE: 74 MG/DL
GLUCOSE SERPL-MCNC: 122 MG/DL
HBA1C MFR BLD HPLC: 4.2 %
HCT VFR BLD AUTO: 40.6 %
HGB BLD-MCNC: 12 G/DL
IMM GRANULOCYTES # BLD AUTO: 0.02 K/UL
IMM GRANULOCYTES NFR BLD AUTO: 0.2 %
INR PPP: 1.1
LYMPHOCYTES # BLD AUTO: 1.6 K/UL
LYMPHOCYTES NFR BLD: 18.3 %
MAGNESIUM SERPL-MCNC: 1.7 MG/DL
MAGNESIUM SERPL-MCNC: 1.9 MG/DL
MCH RBC QN AUTO: 28.7 PG
MCHC RBC AUTO-ENTMCNC: 29.6 G/DL
MCV RBC AUTO: 97 FL
MONOCYTES # BLD AUTO: 0.9 K/UL
MONOCYTES NFR BLD: 10.3 %
NEUTROPHILS # BLD AUTO: 6.2 K/UL
NEUTROPHILS NFR BLD: 70.7 %
NRBC BLD-RTO: 0 /100 WBC
PHOSPHATE SERPL-MCNC: 2.9 MG/DL
PHOSPHATE SERPL-MCNC: 3.2 MG/DL
PLATELET # BLD AUTO: 243 K/UL
PMV BLD AUTO: 10.3 FL
POTASSIUM SERPL-SCNC: 3.5 MMOL/L
PROT SERPL-MCNC: 6.1 G/DL
PROTHROMBIN TIME: 11.4 SEC
RBC # BLD AUTO: 4.18 M/UL
SODIUM SERPL-SCNC: 145 MMOL/L
WBC # BLD AUTO: 8.75 K/UL

## 2018-09-22 PROCEDURE — 63600175 PHARM REV CODE 636 W HCPCS: Performed by: STUDENT IN AN ORGANIZED HEALTH CARE EDUCATION/TRAINING PROGRAM

## 2018-09-22 PROCEDURE — 20600001 HC STEP DOWN PRIVATE ROOM

## 2018-09-22 PROCEDURE — 84100 ASSAY OF PHOSPHORUS: CPT

## 2018-09-22 PROCEDURE — 36415 COLL VENOUS BLD VENIPUNCTURE: CPT

## 2018-09-22 PROCEDURE — 85730 THROMBOPLASTIN TIME PARTIAL: CPT | Mod: 91

## 2018-09-22 PROCEDURE — 25500020 PHARM REV CODE 255: Performed by: STUDENT IN AN ORGANIZED HEALTH CARE EDUCATION/TRAINING PROGRAM

## 2018-09-22 PROCEDURE — 99223 1ST HOSP IP/OBS HIGH 75: CPT | Mod: ,,, | Performed by: SURGERY

## 2018-09-22 PROCEDURE — 80053 COMPREHEN METABOLIC PANEL: CPT

## 2018-09-22 PROCEDURE — 25000003 PHARM REV CODE 250: Performed by: STUDENT IN AN ORGANIZED HEALTH CARE EDUCATION/TRAINING PROGRAM

## 2018-09-22 PROCEDURE — 85610 PROTHROMBIN TIME: CPT

## 2018-09-22 PROCEDURE — 85730 THROMBOPLASTIN TIME PARTIAL: CPT

## 2018-09-22 PROCEDURE — 85025 COMPLETE CBC W/AUTO DIFF WBC: CPT

## 2018-09-22 PROCEDURE — 99223 1ST HOSP IP/OBS HIGH 75: CPT | Mod: AI,,, | Performed by: INTERNAL MEDICINE

## 2018-09-22 PROCEDURE — 83735 ASSAY OF MAGNESIUM: CPT

## 2018-09-22 PROCEDURE — 83036 HEMOGLOBIN GLYCOSYLATED A1C: CPT

## 2018-09-22 RX ORDER — ENOXAPARIN SODIUM 100 MG/ML
90 INJECTION SUBCUTANEOUS
Status: DISCONTINUED | OUTPATIENT
Start: 2018-09-22 | End: 2018-09-23

## 2018-09-22 RX ORDER — PSEUDOEPHEDRINE/ACETAMINOPHEN 30MG-500MG
100 TABLET ORAL
Status: COMPLETED | OUTPATIENT
Start: 2018-09-22 | End: 2018-09-22

## 2018-09-22 RX ORDER — MORPHINE SULFATE 10 MG/ML
1 INJECTION INTRAMUSCULAR; INTRAVENOUS; SUBCUTANEOUS ONCE
Status: DISCONTINUED | OUTPATIENT
Start: 2018-09-22 | End: 2018-09-24 | Stop reason: HOSPADM

## 2018-09-22 RX ORDER — ACETAMINOPHEN 10 MG/ML
1000 INJECTION, SOLUTION INTRAVENOUS ONCE
Status: DISCONTINUED | OUTPATIENT
Start: 2018-09-22 | End: 2018-09-22

## 2018-09-22 RX ORDER — SODIUM CHLORIDE 9 MG/ML
INJECTION, SOLUTION INTRAVENOUS CONTINUOUS
Status: DISCONTINUED | OUTPATIENT
Start: 2018-09-22 | End: 2018-09-24

## 2018-09-22 RX ORDER — SYRING-NEEDL,DISP,INSUL,0.3 ML 29 G X1/2"
296 SYRINGE, EMPTY DISPOSABLE MISCELLANEOUS
Status: COMPLETED | OUTPATIENT
Start: 2018-09-22 | End: 2018-09-22

## 2018-09-22 RX ADMIN — SODIUM CHLORIDE 500 ML: 0.9 INJECTION, SOLUTION INTRAVENOUS at 01:09

## 2018-09-22 RX ADMIN — MAGESIUM CITRATE 296 ML: 1.75 LIQUID ORAL at 01:09

## 2018-09-22 RX ADMIN — Medication 100 ML: at 01:09

## 2018-09-22 RX ADMIN — SODIUM CHLORIDE 100 ML/HR: 0.9 INJECTION, SOLUTION INTRAVENOUS at 03:09

## 2018-09-22 RX ADMIN — ONDANSETRON 4 MG: 2 INJECTION INTRAMUSCULAR; INTRAVENOUS at 02:09

## 2018-09-22 RX ADMIN — ENOXAPARIN SODIUM 90 MG: 100 INJECTION SUBCUTANEOUS at 11:09

## 2018-09-22 RX ADMIN — LEVETIRACETAM 500 MG: 5 INJECTION INTRAVENOUS at 11:09

## 2018-09-22 RX ADMIN — HEPARIN SODIUM AND DEXTROSE 18 UNITS/KG/HR: 10000; 5 INJECTION INTRAVENOUS at 01:09

## 2018-09-22 RX ADMIN — DIATRIZOATE MEGLUMINE AND DIATRIZOATE SODIUM 50 ML: 660; 100 LIQUID ORAL; RECTAL at 02:09

## 2018-09-22 RX ADMIN — LEVETIRACETAM 500 MG: 5 INJECTION INTRAVENOUS at 09:09

## 2018-09-22 NOTE — PROGRESS NOTES
[facility]  General Surgery  Progress Note    Subjective:     History of Present Illness:   74 yo F with h/o metastatic lung cancer (adenocarcinoma, metastatic to brain and bone, diagnosed 1/2016, currently being treated with Xgeva and Tarceva), pancreatic cancer (s/p Whipple 17 years ago with Dr Platt) p/w abdominal pain and emesis. Pain started abruptly this evening, lower abdominal. Also has had multiple episodes of emesis. Reports normal BM yesterday. Denies decreased UOP. Did take a new probiotic Ensure type drink yesterday. Denies any previous small bowel obstruction.      Interval history    Patient states today she follow up with her PCP and had increased pain, anorexia and x1 emesis. She had x3 BM yesterday and has passed gas this morning.       Post-Op Info:  * No surgery found *         Interval History: No acute events overnight, afebrile, vitals stable. Reports improvement in abdominal pain and nausea. Denies bowel movement or flatus. Gastrograffin challenge in progress, contrast given at 3AM. Also administering enemas given evidence of stool in colon on CT.     Medications:  Continuous Infusions:   heparin (porcine) in D5W 18 Units/kg/hr (09/22/18 0151)     Scheduled Meds:   glycerin 99.5%  100 mL Rectal ED 1 Time    And    magnesium citrate  296 mL Rectal ED 1 Time    And    sodium chloride 0.9%  500 mL Rectal ED 1 Time    levetiracetam IVPB  500 mg Intravenous Q12H    morphine  1 mg Intravenous Once     PRN Meds:dextrose 50%, dextrose 50%, glucagon (human recombinant), glucose, glucose, heparin (PORCINE), heparin (PORCINE), labetalol, ondansetron, sodium chloride 0.9%     Review of patient's allergies indicates:   Allergen Reactions    Tobradex [tobramycin-dexamethasone] Swelling    Iodinated contrast- oral and iv dye Hives    Latex Rash    Phenytoin sodium extended Other (See Comments) and Rash     Objective:     Vital Signs (Most Recent):  Temp: 98.6 °F (37 °C)  (09/22/18 0813)  Pulse: 88 (09/22/18 0813)  Resp: 18 (09/22/18 0813)  BP: (!) 167/79 (09/22/18 0813)  SpO2: 95 % (09/22/18 0813) Vital Signs (24h Range):  Temp:  [98.1 °F (36.7 °C)-98.8 °F (37.1 °C)] 98.6 °F (37 °C)  Pulse:  [] 88  Resp:  [16-18] 18  SpO2:  [77 %-100 %] 95 %  BP: (152-196)/(79-89) 167/79     Weight: 59.9 kg (132 lb 0 oz)  Body mass index is 21.97 kg/m².    Intake/Output - Last 3 Shifts       09/20 0700 - 09/21 0659 09/21 0700 - 09/22 0659 09/22 0700 - 09/23 0659    IV Piggyback  1000     Total Intake(mL/kg)  1000 (16.7)     Net  +1000            Urine Occurrence  1 x           Physical Exam   Constitutional: She is oriented to person, place, and time. No distress.   HENT:   Head: Normocephalic and atraumatic.   Mouth/Throat: No oropharyngeal exudate.   Surgical scar on scalp s/p meningioma resection   Eyes: Conjunctivae and EOM are normal. Pupils are equal, round, and reactive to light. No scleral icterus.   Neck: Normal range of motion. Neck supple.   Cardiovascular: Normal rate, regular rhythm and intact distal pulses.   No murmur heard.  Pulmonary/Chest: No respiratory distress. She has no wheezes. She has rales. She exhibits no tenderness.   Abdominal: Soft. She exhibits no distension. There is no tenderness. There is no rebound and no guarding.   NG tube in place   Musculoskeletal: She exhibits no tenderness. Edema: trace b/l LE edema.   Lymphadenopathy:     She has no cervical adenopathy.   Neurological: She is alert and oriented to person, place, and time.   Skin: Skin is warm. No rash noted. She is not diaphoretic. No erythema.   Psychiatric: She has a normal mood and affect.       Significant Labs:  CBC:   Recent Labs   Lab  09/22/18   0555   WBC  8.75   RBC  4.18   HGB  12.0   HCT  40.6   PLT  243   MCV  97   MCH  28.7   MCHC  29.6*     CMP:   Recent Labs   Lab  09/22/18   0506   GLU  122*   CALCIUM  8.8   ALBUMIN  3.1*   PROT  6.1   NA  145   K  3.5   CO2  22*   CL  111*   BUN  20    CREATININE  1.3   ALKPHOS  137*   ALT  53*   AST  34   BILITOT  2.2*       Significant Diagnostics:  I have reviewed all pertinent imaging results/findings within the past 24 hours.    Assessment/Plan:     Abdominal pain    72 yo F with metastatic lung cancer, h/o whipple for pancreatic cancer with ileus vs sbo likely from constipation.      - Continue with NGT until having bowel function, then ok to discontinue  - NPO while NGT  - Will follow up gastrograffin challenge.   - Enemas BID, added brown bomb enema this morning  - Will continue to follow for now                          Ara Dodge MD  General Surgery  [facility]  No output after gastrograffin  Will retry enema  Hope to avoid surgery, but if no output per rectum over the next 24 hours may need to consider ex lap

## 2018-09-22 NOTE — H&P
[facility]  Hematology/Oncology  H&P    Patient Name: Naty St  MRN: [id number]  Admission Date: 9/21/2018  Code Status: Full Code   Attending Provider: Helena George MD  Primary Care Physician: Olvin Mars MD  Principal Problem:SBO (small bowel obstruction)    Subjective:     HPI: 74 y/o F PMhx R lung adenocarcinoma on Xgeva and Tarceva, breast ca s/p L lumpectomy, meningioma s/p resection, pancreatic ca s/p whipple, and s/p hysterectomy w/ b/l salpingo-oophorectomy, HFpEF, HTN who presents w/ abd pain, nausea, emesis. Patient initially presented to ED yesterday evening after having lower mid-line abd pain & a bout of bilious emesis. In ED she was given zofran, dilaudid and 1 L NS. CT abd/ pelvis demonstrated significant distention of multiple loops of small bowel measuring up to 4.5 cm with fecalization of small bowel contents & small hiatal hernia. She was subsequently discharged w/ IM clinic f/u today who referred her to the ED. She states that prior to initial ED visit she had 3 medium sized BM, however, has had none since that time and denies passing flatus. Additionally, she states abd pain slightly improved after bout of emesis. She endorses n/v, abd pain. She denies BRBPR, melena, hematemesis, hematochezia, chest pain, SOB.    Oncologic History per Dr. Vazquez's last clinic note:  had a meningioma resection done in the past; however, recently, underwent neurosurgical resection with Dr. 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    Additional immunohistochemical stains were performed which revealed the tumor cells to be are positive for TTF1 and negative for ER and GCDFP. The morphology and TTF1 positivity are most consistent with lung primary.    Of note, imaging scan at the end of January 2016 revealed a mass in the lung at 2 cm in the medial aspect of the  "apical segment of the right upper lobe abutting the mediastinum at the level of the azygous vein and abutting and possibly encasing the segmental bronchi and vessels of the apical segment of the right upper lobe and no pleural fluid was present. Also, there is an enlarged right paratracheal lymph node. No evidence of any metastatic disease at the pancreatic site with postoperative changes post Whipple disease  Her PET Scan from 2/15/16 reveal "Hypermetabolic mass in the right lung apex consistent with a primary malignancy. Hypermetabolic mediastinal lymph nodes consistent with metastatic disease. Right sacral hypermetabolic lesion consistent with metastatic disease, noting additional mildly sclerotic lesions in multiple vertebral bodies which do not demonstrate abnormal hypermetabolism  She underwent IR bone biopsy which revealed metastatic adenocarcinoma of lung origin. 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  She has completed focal RT to brain lesions in March 2016.    PET scan from 6/6/16 shows "Dramatic almost complete response to therapy."  Lovenox started after US lower ext 6/7/16 shows Acute complete occlusion of one of the left posterior tibial vein.Remote partial thrombus of the proximal left superficial femoral vein.  She is on Tarceva.   12/6/16 PET scan reveals "Stable right upper lobe lesion and right hilar lymph node. No new lesions identified. Trace left pleural effusion".  1/27/17 Renal u/s "Medical renal disease. Nonobstructive right nephrolithiasis"  1/31/17 PET - "Right upper lobe nodule and right hilar lymph node similar and very low grade activity.  There is no definite evidence of recurrence."   11/9/17 PET "In this patient with history of lung cancer, there is interval increase in size and hypermetabolism of a right upper lobe lung lesion concerning for recurrent disease. Additionally, there is interval appearance of a hypermetabolic paratracheal lymph node suspicious for metastatic " "disease"     She progressed on Tarceva She underwent EBUS on 12/5/17. Pathology revealed adenocarcinoma but T790m could not be done as quantity was not insufficient. Her PET scan from 1/30/18 revealed The patient's previously identified abnormal lesions is slightly greater uptake of FDG on today's study compared with prior exam. These findings are concerning for progression of disease"      She was hospitalized between 4/9/18-4/16/18. Her hospital course was as follows "Patient was admitted to hemonc service on 4/9 with acute hypoxic respiratory failure. She was requiring 4 L of nc on admission. She was started on moxi for CAP. She received one dose of ceftriaxone in the ED. On 2nd day of admission she spiked a fever. Her Hb was ~7 g/dl so she was transfused 1 unit of PRBCs. Over night she developed worsening of her symptoms with increased O2 requirements to 15 L HFNC. Her CXR showed worsening congestion. There was a concern of TRALI vs TACO. New 2D echo with diastolic dysfunction. She was given IV lasix pushes as needed and was started on dexamethasone.  Her abx was escalated to vanc and cefepime. She improved with diuresis and steroids. Pulmonary were consulted. Her O2 requirements continued to improve. On 4/15 she was switched to Augmentin. PT recommended discharging her to SNF but the patient refused and she wanted to go home with home health. She qualified for home oxygen so she was discharged on 3 L nc while at rest and 6 while active. Augmentin and dexamethasone were discontinued on discharge"     She was readmitted from 4/27 to 5/3/18 with who presented to the ED with a complaint of fatigue and worsening DELA CRUZ. Pt diagnosed PNA 4/9 and was discharged on 4/16 on 3L oxygen. She was initially placed on cefepime for 3 days followed by an add'l 2 days of Augmentin. Pulm was consulted with assistance as her oxygen requirements were increasing. She was placed on oral steroids, then trailed on 80mg TID solumedrol for " "2 days and will be discharged on a prednisone taper. She was also diuresed with 2 days of 40mg IV lasix with appropriate UOP resulting, improvement on repeat CXR. She was medically stable and appropriate for DC home with home health on 1L continuous O2. She will be seen for close f/u and may be able to come off oxygen at that time.      She discontinued Tagrisso in April 2018. Restaging scans from 6/4/18 revealed "Stable appearance of a spiculated right upper lobe pulmonary lesion.  Additional irregular focus superior to the lesion in the right lung apex is stable and may represent scar or additional lesion; although this was not hypermetabolic on prior PET-CT.  No new pulmonary lesions. 1.3 cm subcarinal lymph node, not hypermetabolic on prior PET-CT. Stable postsurgical changes of a Whipple procedure. Bilateral nonobstructing nephrolithiasis.  Stable sclerotic focus in the T5 vertebral body, possibly a bone island as this was not hypermetabolic on prior PET-CT. Small hiatal hernia. RECIST SUMMARY: Date of prior examination for comparison 01/30/2018 Lesion 1: Right upper lobe 1.3 cm Series 2 image 31 prior measurement 1.3 cm"  Bone scan also from 6/4/18 revealed no evidence of mets.     Her PET scan from 7/11/18 revealed "Right suprahilar lesion SUV max 3.76, previously 6.86. Pretracheal lymph node SUV max 2.55, previously 3.79. There is physiologic intracranial, head, and neck activity.  There is muscle activation.  There is vocal cord activation.  There is physiologic liver, spleen, GI and  activity.  There is a hiatal hernia.  Pelvic organs show nothing unusual.  No bone lesions are seen.  There are areas of benign appearing lung scar"  She notes fatigue.  She denies any nausea, vomiting, diarrhea, constipation, abdominal pain, weight loss or loss of appetite, chest pain, shortness of breath, leg swelling, fatigue, pain, headache, dizziness, or mood changes. Her ECOG PS is 2. She is accompanied by her  " "and son     She has been on Tarceva since 6/5/18     Oncology Treatment Plan:   [No treatment plan]    Medications:  Continuous Infusions:  Scheduled Meds:  PRN Meds:dextrose 50%, dextrose 50%, gastrograffin, glucagon (human recombinant), glucose, glucose, sodium chloride 0.9%     Review of patient's allergies indicates:   Allergen Reactions    Tobradex [tobramycin-dexamethasone] Swelling    Iodinated contrast- oral and iv dye Hives    Morphine Other (See Comments)     "shaking" and tremors    Latex Rash    Phenytoin sodium extended Other (See Comments) and Rash        Past Medical History:   Diagnosis Date    Anticoagulant long-term use     Blood clot in vein 06/2016    Breast cancer 1994    Cataract     Hypertension     Pancreatic cancer 1998    Posterior capsular opacification, left eye     Psychiatric problem     Seizures 01/25/2016    Stroke     Therapy      Past Surgical History:   Procedure Laterality Date    NLRTDL-OCRUUS-QR N/A 3/9/2016    Performed by [facility] at [facility] OR [facility]    BONE BIOSPY  3/9/16    BRAIN SURGERY  1/12/16    tumor removal 1/12/16    BREAST LUMPECTOMY  1998    left    BRONCHOSCOPY N/A 12/5/2017    Performed by Kiya Zhang MD at [facility] OR 2ND FLR    CATARACT EXTRACTION Bilateral 2004    yag OU    CHOLECYSTECTOMY  1998    COLONOSCOPY N/A 11/13/2015    Procedure: COLONOSCOPY;  Surgeon: Alex Carmona MD;  Location: [facility] (4TH FLR);  Service: Endoscopy;  Laterality: N/A;  2 year f/u    COLONOSCOPY N/A 11/13/2015    Performed by Alex Carmona MD at [facility] (4TH FLR)    COLONOSCOPY N/A 9/26/2013    Performed by Gavin Prasad MD at [facility] (4TH FLR)    CRANIOTOMY WITH STEALTH Left 1/12/2016    Performed by Salty Ferrera MD at [facility] OR 2ND FLR    cysto and right ureteral stent  4/27/12    ecoli  2010    removal of blockage, done throat, then through the liver.    ENDOBRONCHIAL ULTRASOUND (EBUS) N/A 12/5/2017    Performed " by [facility] Zhang MD at [facility] OR 2ND FLR    ESOPHAGOGASTRODUODENOSCOPY (EGD) N/A 3/14/2018    Performed by Alex Carmona MD at [facility] ENDO (4TH FLR)    HYSTERECTOMY  1974    KIDNEY STONE SURGERY  --laser    left eswl  12    OOPHORECTOMY  2012    Laparoscopic BSO, lysis of adhesions, cystoscopy greater than 35mins     WHIPPLE PROCEDURE W/ LAPAROSCOPY       Family History     Problem Relation (Age of Onset)    Breast cancer Mother    Leukemia Paternal Grandfather    Lung cancer Mother    Stomach cancer Paternal Grandmother        Tobacco Use    Smoking status: Former Smoker     Packs/day: 0.00     Years: 0.00     Pack years: 0.00     Last attempt to quit: 1961     Years since quittin.0    Smokeless tobacco: Never Used   Substance and Sexual Activity    Alcohol use: No    Drug use: No    Sexual activity: Yes     Partners: Male       Review of Systems   Constitutional: Negative for chills, fatigue and fever.   HENT: Positive for rhinorrhea. Negative for sinus pressure, sinus pain and sore throat.    Eyes: Negative for photophobia.   Respiratory: Positive for cough. Negative for shortness of breath.    Cardiovascular: Positive for palpitations and leg swelling. Negative for chest pain.   Gastrointestinal: Positive for abdominal distention, abdominal pain, nausea and vomiting. Negative for diarrhea.        No melena, BRBPR, hematemesis   Genitourinary: Negative for dysuria and hematuria.   Musculoskeletal: Negative for arthralgias, back pain and myalgias.   Skin: Negative for rash.   Neurological: Positive for dizziness, speech difficulty and numbness (feet). Negative for weakness, light-headedness and headaches.   Psychiatric/Behavioral: Positive for confusion. Negative for agitation.     Objective:     Vital Signs (Most Recent):  Temp: 98.8 °F (37.1 °C) (18)  Pulse: 82 (18)  Resp: 16 (18)  BP: (!) 155/89 (18)  SpO2: 97 % (18)  Vital Signs (24h Range):  Temp:  [98.1 °F (36.7 °C)-98.8 °F (37.1 °C)] 98.8 °F (37.1 °C)  Pulse:  [82-99] 82  Resp:  [16-18] 16  SpO2:  [96 %-99 %] 97 %  BP: (142-192)/(75-92) 155/89     Weight: 59.9 kg (132 lb)  Body mass index is 21.97 kg/m².  Body surface area is 1.66 meters squared.      Intake/Output Summary (Last 24 hours) at 9/21/2018 2242  Last data filed at 9/21/2018 2059  Gross per 24 hour   Intake 1000 ml   Output --   Net 1000 ml       Physical Exam   Constitutional: She is oriented to person, place, and time. No distress.   HENT:   Head: Normocephalic and atraumatic.   Mouth/Throat: No oropharyngeal exudate.   Surgical scar on scalp s/p meningioma resection   Eyes: Conjunctivae and EOM are normal. Pupils are equal, round, and reactive to light. No scleral icterus.   Neck: Normal range of motion. Neck supple.   Cardiovascular: Normal rate, regular rhythm and intact distal pulses.   No murmur heard.  Pulmonary/Chest: No respiratory distress. She has no wheezes. She has rales. She exhibits no tenderness.   Abdominal: Soft. She exhibits distension. There is no tenderness. There is no rebound and no guarding.   NG tube in place   Musculoskeletal: She exhibits no tenderness. Edema: trace b/l LE edema.   Lymphadenopathy:     She has no cervical adenopathy.   Neurological: She is alert and oriented to person, place, and time. Difficulty finding words and stutters intermittently  Skin: Skin is warm. No rash noted. She is not diaphoretic. No erythema.   Psychiatric: She has a normal mood and affect.       Significant Labs:   CBC:   Recent Labs   Lab  09/21/18   0114  09/21/18 2214   WBC  8.07  9.12   HGB  13.3  13.4   HCT  41.5  42.6   PLT  274  245   , CMP:   Recent Labs   Lab  09/21/18   0114  09/21/18 2214   NA  144  145   K  3.6  3.7   CL  109  110   CO2  23  25   GLU  114*  110   BUN  25*  21   CREATININE  1.6*  1.4   CALCIUM  10.1  9.0   PROT  6.8  6.3   ALBUMIN  3.5  3.2*   BILITOT  2.3*  2.3*    ALKPHOS  170*  145*   AST  36  37   ALT  73*  55*   ANIONGAP  12  10   EGFRNONAA  31.5*  37.0*    and Coagulation: No results for input(s): PT, INR, APTT in the last 48 hours.    Diagnostic Results:  I have reviewed all pertinent imaging results/findings within the past 24 hours.    Assessment/Plan:     * SBO (small bowel obstruction)    KUB: There is mild gaseous distention of large and small bowel.  Moderate fecal loading in the left hemicolon.  CT abd/ pelvis Small hiatal hernia containing ingested contents placing the patient at risk for aspiration. significant distention of multiple loops of small bowel measuring up to 4.5 cm with fecalization of small bowel contents.  No definite transition point is identified, noting limitations from lack of IV or oral contrast.    NPO  Surgery consulted  NG tube placed  Per surgery Gastrogaffin challenge w/ serial x-rays at 1, 4 & 8 hours post gastrograffin administration w/ NG tube clamped for 4 hours then placed to low intermittent suction  Per surgery enemas bid  Caution w/ phosphate containing enemas given CKD  Recommend tap water enema  Monitor electrolytes        History of deep vein thrombosis (DVT) of lower extremity    Has been on chronic anticoagulation since 2016 following DVT LLE  On eliquis 5mg bid (pt w/ documented weight 59.9kg, creatinine 1.4, age 73) however pt NPO  Will start on heparin gtt for now  Once resolution of SBO can restart eliquis although dose adjustment may be needed based on weight and creatinine        Anxiety    Patient takes ativan 1mg PO PRN  Per prior neurology note attempt to minimize ativan  Will hold off ativan for now, if needed may give ativan .5mg PRN for anxiety        Insomnia    Per last neurology note decrease elavil to 25mg qhs from 50mg  Will hold for now as pt NPO        Dyslipidemia    NPO  Holding home atorvastatin for now        Stage 3 chronic kidney disease    Monitor renal function         Malignant neoplasm of lower  lobe of right lung    Adenocarcinoma of R lung   On Xgeva and Tarceva  Last chemo session 9/18        Meningioma    S/p resection in 2016  Has been on keppra as seizure prophylaxis since  As pt NPO, switched from PO to IV keppra 500mg bid        Essential hypertension    Holding home BP meds  labetelol 10mg PRN for SBP >180            Danny Canas MD  Hematology/Oncology  [facility]      ATTENDING NOTE, ONCOLOGY INPATIENT TEAM    As above.  Patient seen and examined, chart reviewed. Radiologic studies reviewed.  Appears relatively comfortable, in NAD.  Lungs are clear to auscultation.  Abdomen is soft, nontender. Bowel sounds are diminished.  Labs reviewed.    PLAN  Follow electrolytes daily.  Repeat abdominal x rays in am.  Keep NG tube clamped for now, unless she starts vomiting.  Keep patient NPO,  Enemas.  Switch to enoxaparin 90 mg QD.  We will follow.  Her multiple questions were answered to her satisfaction.      Og Pierce MD

## 2018-09-22 NOTE — SUBJECTIVE & OBJECTIVE
Interval History: No acute events overnight, afebrile, vitals stable. Reports improvement in abdominal pain and nausea. Denies bowel movement or flatus. Gastrograffin challenge in progress, contrast given at 3AM. Also administering enemas given evidence of stool in colon on CT.     Medications:  Continuous Infusions:   heparin (porcine) in D5W 18 Units/kg/hr (09/22/18 0151)     Scheduled Meds:   glycerin 99.5%  100 mL Rectal ED 1 Time    And    magnesium citrate  296 mL Rectal ED 1 Time    And    sodium chloride 0.9%  500 mL Rectal ED 1 Time    levetiracetam IVPB  500 mg Intravenous Q12H    morphine  1 mg Intravenous Once     PRN Meds:dextrose 50%, dextrose 50%, glucagon (human recombinant), glucose, glucose, heparin (PORCINE), heparin (PORCINE), labetalol, ondansetron, sodium chloride 0.9%     Review of patient's allergies indicates:   Allergen Reactions    Tobradex [tobramycin-dexamethasone] Swelling    Iodinated contrast- oral and iv dye Hives    Latex Rash    Phenytoin sodium extended Other (See Comments) and Rash     Objective:     Vital Signs (Most Recent):  Temp: 98.6 °F (37 °C) (09/22/18 0813)  Pulse: 88 (09/22/18 0813)  Resp: 18 (09/22/18 0813)  BP: (!) 167/79 (09/22/18 0813)  SpO2: 95 % (09/22/18 0813) Vital Signs (24h Range):  Temp:  [98.1 °F (36.7 °C)-98.8 °F (37.1 °C)] 98.6 °F (37 °C)  Pulse:  [] 88  Resp:  [16-18] 18  SpO2:  [77 %-100 %] 95 %  BP: (152-196)/(79-89) 167/79     Weight: 59.9 kg (132 lb 0 oz)  Body mass index is 21.97 kg/m².    Intake/Output - Last 3 Shifts       09/20 0700 - 09/21 0659 09/21 0700 - 09/22 0659 09/22 0700 - 09/23 0659    IV Piggyback  1000     Total Intake(mL/kg)  1000 (16.7)     Net  +1000            Urine Occurrence  1 x           Physical Exam   Constitutional: She is oriented to person, place, and time. No distress.   HENT:   Head: Normocephalic and atraumatic.   Mouth/Throat: No oropharyngeal exudate.   Surgical scar on scalp s/p meningioma resection    Eyes: Conjunctivae and EOM are normal. Pupils are equal, round, and reactive to light. No scleral icterus.   Neck: Normal range of motion. Neck supple.   Cardiovascular: Normal rate, regular rhythm and intact distal pulses.   No murmur heard.  Pulmonary/Chest: No respiratory distress. She has no wheezes. She has rales. She exhibits no tenderness.   Abdominal: Soft. She exhibits no distension. There is no tenderness. There is no rebound and no guarding.   NG tube in place   Musculoskeletal: She exhibits no tenderness. Edema: trace b/l LE edema.   Lymphadenopathy:     She has no cervical adenopathy.   Neurological: She is alert and oriented to person, place, and time.   Skin: Skin is warm. No rash noted. She is not diaphoretic. No erythema.   Psychiatric: She has a normal mood and affect.       Significant Labs:  CBC:   Recent Labs   Lab  09/22/18   0555   WBC  8.75   RBC  4.18   HGB  12.0   HCT  40.6   PLT  243   MCV  97   MCH  28.7   MCHC  29.6*     CMP:   Recent Labs   Lab  09/22/18   0506   GLU  122*   CALCIUM  8.8   ALBUMIN  3.1*   PROT  6.1   NA  145   K  3.5   CO2  22*   CL  111*   BUN  20   CREATININE  1.3   ALKPHOS  137*   ALT  53*   AST  34   BILITOT  2.2*       Significant Diagnostics:  I have reviewed all pertinent imaging results/findings within the past 24 hours.

## 2018-09-22 NOTE — HPI
72 y/o F PMhx R lung adenocarcinoma on Xgeva and Tarceva, breast ca s/p L lumpectomy, meningioma s/p resection, pancreatic ca s/p whipple, and s/p hysterectomy w/ b/l salpingo-oophorectomy, HFpEF, HTN who presents w/ abd pain, nausea, emesis. Patient initially presented to ED yesterday evening after having lower mid-line abd pain & a bout of bilious emesis. In ED she was given zofran, dilaudid and 1 L NS. CT abd/ pelvis demonstrated significant distention of multiple loops of small bowel measuring up to 4.5 cm with fecalization of small bowel contents & small hiatal hernia. She was subsequently discharged w/ IM clinic f/u today who referred her to the ED. She states that prior to initial ED visit she had 3 medium sized BM, however, has had none since that time and denies passing flatus. Additionally, she states abd pain slightly improved after bout of emesis. She endorses n/v, abd pain. She denies BRBPR, melena, hematemesis, hematochezia, chest pain, SOB.    Oncologic History per Dr. Vazquez's last clinic note:  had a meningioma resection done in the past; however, recently, underwent neurosurgical resection with Dr. 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    Additional immunohistochemical stains were performed which revealed the tumor cells to be are positive for TTF1 and negative for ER and GCDFP. The morphology and TTF1 positivity are most consistent with lung primary.    Of note, imaging scan at the end of January 2016 revealed a mass in the lung at 2 cm in the medial aspect of the apical segment of the right upper lobe abutting the mediastinum at the level of the azygous vein and abutting and possibly encasing the segmental bronchi and vessels of the apical segment of the right upper lobe and no pleural fluid was present. Also, there is an enlarged right paratracheal lymph node. No evidence of any  "metastatic disease at the pancreatic site with postoperative changes post Whipple disease  Her PET Scan from 2/15/16 reveal "Hypermetabolic mass in the right lung apex consistent with a primary malignancy. Hypermetabolic mediastinal lymph nodes consistent with metastatic disease. Right sacral hypermetabolic lesion consistent with metastatic disease, noting additional mildly sclerotic lesions in multiple vertebral bodies which do not demonstrate abnormal hypermetabolism  She underwent IR bone biopsy which revealed metastatic adenocarcinoma of lung origin. 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  She has completed focal RT to brain lesions in March 2016.    PET scan from 6/6/16 shows "Dramatic almost complete response to therapy."  Lovenox started after US lower ext 6/7/16 shows Acute complete occlusion of one of the left posterior tibial vein.Remote partial thrombus of the proximal left superficial femoral vein.  She is on Tarceva.   12/6/16 PET scan reveals "Stable right upper lobe lesion and right hilar lymph node. No new lesions identified. Trace left pleural effusion".  1/27/17 Renal u/s "Medical renal disease. Nonobstructive right nephrolithiasis"  1/31/17 PET - "Right upper lobe nodule and right hilar lymph node similar and very low grade activity.  There is no definite evidence of recurrence."   11/9/17 PET "In this patient with history of lung cancer, there is interval increase in size and hypermetabolism of a right upper lobe lung lesion concerning for recurrent disease. Additionally, there is interval appearance of a hypermetabolic paratracheal lymph node suspicious for metastatic disease"     She progressed on Tarceva She underwent EBUS on 12/5/17. Pathology revealed adenocarcinoma but T790m could not be done as quantity was not insufficient. Her PET scan from 1/30/18 revealed The patient's previously identified abnormal lesions is slightly greater uptake of FDG on today's study compared with prior exam. " "These findings are concerning for progression of disease"      She was hospitalized between 4/9/18-4/16/18. Her hospital course was as follows "Patient was admitted to hemonc service on 4/9 with acute hypoxic respiratory failure. She was requiring 4 L of nc on admission. She was started on moxi for CAP. She received one dose of ceftriaxone in the ED. On 2nd day of admission she spiked a fever. Her Hb was ~7 g/dl so she was transfused 1 unit of PRBCs. Over night she developed worsening of her symptoms with increased O2 requirements to 15 L HFNC. Her CXR showed worsening congestion. There was a concern of TRALI vs TACO. New 2D echo with diastolic dysfunction. She was given IV lasix pushes as needed and was started on dexamethasone.  Her abx was escalated to vanc and cefepime. She improved with diuresis and steroids. Pulmonary were consulted. Her O2 requirements continued to improve. On 4/15 she was switched to Augmentin. PT recommended discharging her to SNF but the patient refused and she wanted to go home with home health. She qualified for home oxygen so she was discharged on 3 L nc while at rest and 6 while active. Augmentin and dexamethasone were discontinued on discharge"     She was readmitted from 4/27 to 5/3/18 with who presented to the ED with a complaint of fatigue and worsening DELA CRUZ. Pt diagnosed PNA 4/9 and was discharged on 4/16 on 3L oxygen. She was initially placed on cefepime for 3 days followed by an add'l 2 days of Augmentin. Pulm was consulted with assistance as her oxygen requirements were increasing. She was placed on oral steroids, then trailed on 80mg TID solumedrol for 2 days and will be discharged on a prednisone taper. She was also diuresed with 2 days of 40mg IV lasix with appropriate UOP resulting, improvement on repeat CXR. She was medically stable and appropriate for DC home with home health on 1L continuous O2. She will be seen for close f/u and may be able to come off oxygen at that " "time.      She discontinued Tagrisso in April 2018. Restaging scans from 6/4/18 revealed "Stable appearance of a spiculated right upper lobe pulmonary lesion.  Additional irregular focus superior to the lesion in the right lung apex is stable and may represent scar or additional lesion; although this was not hypermetabolic on prior PET-CT.  No new pulmonary lesions. 1.3 cm subcarinal lymph node, not hypermetabolic on prior PET-CT. Stable postsurgical changes of a Whipple procedure. Bilateral nonobstructing nephrolithiasis.  Stable sclerotic focus in the T5 vertebral body, possibly a bone island as this was not hypermetabolic on prior PET-CT. Small hiatal hernia. RECIST SUMMARY: Date of prior examination for comparison 01/30/2018 Lesion 1: Right upper lobe 1.3 cm Series 2 image 31 prior measurement 1.3 cm"  Bone scan also from 6/4/18 revealed no evidence of mets.     Her PET scan from 7/11/18 revealed "Right suprahilar lesion SUV max 3.76, previously 6.86. Pretracheal lymph node SUV max 2.55, previously 3.79. There is physiologic intracranial, head, and neck activity.  There is muscle activation.  There is vocal cord activation.  There is physiologic liver, spleen, GI and  activity.  There is a hiatal hernia.  Pelvic organs show nothing unusual.  No bone lesions are seen.  There are areas of benign appearing lung scar"  She notes fatigue.  She denies any nausea, vomiting, diarrhea, constipation, abdominal pain, weight loss or loss of appetite, chest pain, shortness of breath, leg swelling, fatigue, pain, headache, dizziness, or mood changes. Her ECOG PS is 2. She is accompanied by her  and son     She has been on Tarceva since 6/5/18  "

## 2018-09-22 NOTE — ASSESSMENT & PLAN NOTE
74 yo F with metastatic lung cancer, h/o whipple for pancreatic cancer with ileus vs sbo likely from constipation.      - Likely partial SBO vs ileus from severe constipation.   - Recommend placement of NGT as previous CT scan showed dilated stomach, patient is at risk of aspiration.   - Gastrograffin challenge.   - Obtain updated labs.   - Enemas BID.   - Thank you for the consult, will continue to follow.    Lucia Mckoy MD  General Surgery PGY V  Beeper: [phone]

## 2018-09-22 NOTE — SUBJECTIVE & OBJECTIVE
"Oncology Treatment Plan:   [No treatment plan]    Medications:  Continuous Infusions:  Scheduled Meds:  PRN Meds:dextrose 50%, dextrose 50%, gastrograffin, glucagon (human recombinant), glucose, glucose, sodium chloride 0.9%     Review of patient's allergies indicates:   Allergen Reactions    Tobradex [tobramycin-dexamethasone] Swelling    Iodinated contrast- oral and iv dye Hives    Morphine Other (See Comments)     "shaking" and tremors    Latex Rash    Phenytoin sodium extended Other (See Comments) and Rash        Past Medical History:   Diagnosis Date    Anticoagulant long-term use     Blood clot in vein 06/2016    Breast cancer 1994    Cataract     Hypertension     Pancreatic cancer 1998    Posterior capsular opacification, left eye     Psychiatric problem     Seizures 01/25/2016    Stroke     Therapy      Past Surgical History:   Procedure Laterality Date    [id number] N/A 3/9/2016    Performed by [facility] Provider at [facility] OR [address] [place] [address]    BONE BIOSPY  3/9/16    BRAIN SURGERY  1/12/16    tumor removal 1/12/16    BREAST LUMPECTOMY  1998    left    BRONCHOSCOPY N/A 12/5/2017    Performed by Kiya Zhang MD at [facility] OR [address] [place] [address]    CATARACT EXTRACTION Bilateral 2004    yag OU    CHOLECYSTECTOMY  1998    COLONOSCOPY N/A 11/13/2015    Procedure: COLONOSCOPY;  Surgeon: Alex Carmona MD;  Location: [facility] ([address] [place] [address]);  Service: Endoscopy;  Laterality: N/A;  2 year f/u    COLONOSCOPY N/A 11/13/2015    Performed by Alex Carmona MD at [facility] (4TH FLR)    COLONOSCOPY N/A 9/26/2013    Performed by Gavin Prasad MD at [facility] (4TH FLR)    CRANIOTOMY WITH STEALTH Left 1/12/2016    Performed by Salty Ferrera MD at [facility] OR [address] [place] [address]    cysto and right ureteral stent  4/27/12    ecoli  2010    removal of blockage, done throat, then through the liver.    ENDOBRONCHIAL ULTRASOUND (EBUS) N/A 12/5/2017    Performed by Kiya Zhang MD at [facility] OR [address] [place] [address]    " ESOPHAGOGASTRODUODENOSCOPY (EGD) N/A 3/14/2018    Performed by [facility] at [facility] (4TH FLR)    HYSTERECTOMY  1974    KIDNEY STONE SURGERY  --laser    left eswl  12    OOPHORECTOMY  2012    Laparoscopic BSO, lysis of adhesions, cystoscopy greater than 35mins     WHIPPLE PROCEDURE W/ LAPAROSCOPY       Family History     Problem Relation (Age of Onset)    Breast cancer Mother    Leukemia Paternal Grandfather    Lung cancer Mother    Stomach cancer Paternal Grandmother        Tobacco Use    Smoking status: Former Smoker     Packs/day: 0.00     Years: 0.00     Pack years: 0.00     Last attempt to quit: 1961     Years since quittin.0    Smokeless tobacco: Never Used   Substance and Sexual Activity    Alcohol use: No    Drug use: No    Sexual activity: Yes     Partners: Male       Review of Systems   Constitutional: Negative for chills, fatigue and fever.   HENT: Positive for rhinorrhea. Negative for sinus pressure, sinus pain and sore throat.    Eyes: Negative for photophobia.   Respiratory: Positive for cough. Negative for shortness of breath.    Cardiovascular: Positive for palpitations and leg swelling. Negative for chest pain.   Gastrointestinal: Positive for abdominal distention, abdominal pain, nausea and vomiting. Negative for diarrhea.        No melena, BRBPR, hematemesis   Genitourinary: Negative for dysuria and hematuria.   Musculoskeletal: Negative for arthralgias, back pain and myalgias.   Skin: Negative for rash.   Neurological: Positive for dizziness, speech difficulty and numbness (feet). Negative for weakness, light-headedness and headaches.   Psychiatric/Behavioral: Positive for confusion. Negative for agitation.     Objective:     Vital Signs (Most Recent):  Temp: 98.8 °F (37.1 °C) (18)  Pulse: 82 (18)  Resp: 16 (18)  BP: (!) 155/89 (18)  SpO2: 97 % (18) Vital Signs (24h Range):  Temp:  [98.1 °F  (36.7 °C)-98.8 °F (37.1 °C)] 98.8 °F (37.1 °C)  Pulse:  [82-99] 82  Resp:  [16-18] 16  SpO2:  [96 %-99 %] 97 %  BP: (142-192)/(75-92) 155/89     Weight: 59.9 kg (132 lb)  Body mass index is 21.97 kg/m².  Body surface area is 1.66 meters squared.      Intake/Output Summary (Last 24 hours) at 9/21/2018 2242  Last data filed at 9/21/2018 2059  Gross per 24 hour   Intake 1000 ml   Output --   Net 1000 ml       Physical Exam   Constitutional: She is oriented to person, place, and time. No distress.   HENT:   Head: Normocephalic and atraumatic.   Mouth/Throat: No oropharyngeal exudate.   Surgical scar on scalp s/p meningioma resection   Eyes: Conjunctivae and EOM are normal. Pupils are equal, round, and reactive to light. No scleral icterus.   Neck: Normal range of motion. Neck supple.   Cardiovascular: Normal rate, regular rhythm and intact distal pulses.   No murmur heard.  Pulmonary/Chest: No respiratory distress. She has no wheezes. She has rales. She exhibits no tenderness.   Abdominal: Soft. She exhibits distension. There is no tenderness. There is no rebound and no guarding.   NG tube in place   Musculoskeletal: She exhibits no tenderness. Edema: trace b/l LE edema.   Lymphadenopathy:     She has no cervical adenopathy.   Neurological: She is alert and oriented to person, place, and time.   Skin: Skin is warm. No rash noted. She is not diaphoretic. No erythema.   Psychiatric: She has a normal mood and affect.       Significant Labs:   CBC:   Recent Labs   Lab  09/21/18   0114  09/21/18 2214   WBC  8.07  9.12   HGB  13.3  13.4   HCT  41.5  42.6   PLT  274  245   , CMP:   Recent Labs   Lab  09/21/18   0114  09/21/18 2214   NA  144  145   K  3.6  3.7   CL  109  110   CO2  23  25   GLU  114*  110   BUN  25*  21   CREATININE  1.6*  1.4   CALCIUM  10.1  9.0   PROT  6.8  6.3   ALBUMIN  3.5  3.2*   BILITOT  2.3*  2.3*   ALKPHOS  170*  145*   AST  36  37   ALT  73*  55*   ANIONGAP  12  10   EGFRNONAA  31.5*  37.0*     and Coagulation: No results for input(s): PT, INR, APTT in the last 48 hours.    Diagnostic Results:  I have reviewed all pertinent imaging results/findings within the past 24 hours.

## 2018-09-22 NOTE — ASSESSMENT & PLAN NOTE
74 yo F with metastatic lung cancer, h/o whipple for pancreatic cancer with ileus vs sbo likely from constipation.      - Continue with NGT until having bowel function, then ok to discontinue  - NPO while NGT  - Will follow up gastrograffin challenge.   - Enemas BID, added brown bomb enema this morning  - Will continue to follow for now

## 2018-09-22 NOTE — CONSULTS
[facility]  General Surgery  Consult Note    Patient Name: Naty St  MRN: [id number]  Code Status: Prior  Admission Date: 9/21/2018  Hospital Length of Stay: 0 days  Attending Physician: Helena George MD  Primary Care Provider: Olvin Mars MD    Patient information was obtained from patient, past medical records and ER records.     Inpatient consult to General surgery  Consult performed by: Lucia Tejeda MD  Consult ordered by: Valerie Piper NP        Subjective:     Principal Problem: <principal problem not specified>    History of Present Illness:  72 yo F with h/o metastatic lung cancer (adenocarcinoma, metastatic to brain and bone, diagnosed 1/2016, currently being treated with Xgeva and Tarceva), pancreatic cancer (s/p Whipple 17 years ago with Dr Platt) p/w abdominal pain and emesis. Pain started abruptly this evening, lower abdominal. Also has had multiple episodes of emesis. Reports normal BM yesterday. Denies decreased UOP. Did take a new probiotic Ensure type drink yesterday. Denies any previous small bowel obstruction.      Interval history    Patient states today she follow up with her PCP and had increased pain, anorexia and x1 emesis. She had x3 BM yesterday and has passed gas this morning.       Current Facility-Administered Medications on File Prior to Encounter   Medication    denosumab (XGEVA) solution 120 mg    [COMPLETED] HYDROmorphone injection 0.5 mg    [COMPLETED] ondansetron injection 4 mg    [COMPLETED] sodium chloride 0.9% bolus 1,000 mL     Current Outpatient Medications on File Prior to Encounter   Medication Sig    amitriptyline (ELAVIL) 50 MG tablet Take 1 tablet (50 mg total) by mouth every evening.    amLODIPine (NORVASC) 5 MG tablet Take 1 tablet (5 mg total) by mouth once daily.    apixaban (ELIQUIS) 5 mg Tab TAKE 1 TABLET BY MOUTH TWO TIMES A DAY    atorvastatin (LIPITOR) 40 MG tablet Take 1 tablet (40 mg total) by  mouth once daily.    CREON CpDR TAKE ONE CAPSULE BY MOUTH THREE TIMES A DAY WITH MEALS    dronabinol (MARINOL) 5 MG capsule Take 1 capsule (5 mg total) by mouth 2 (two) times daily before meals.    erlotinib (TARCEVA) 150 MG tablet Take 1 tablet (150 mg total) by mouth once daily.    levETIRAcetam (KEPPRA) 500 MG Tab Take 1 tablet (500 mg total) by mouth 2 (two) times daily.    LORazepam (ATIVAN) 1 MG tablet TAKE ONE TABLET BY MOUTH TWICE DAILY    losartan (COZAAR) 50 MG tablet Take 1 tablet (50 mg total) by mouth 2 (two) times daily.    metoprolol succinate (TOPROL-XL) 50 MG 24 hr tablet Take 1 tablet (50 mg total) by mouth once daily.    mirabegron 50 mg Tb24 Take 1 tablet (50 mg total) by mouth once daily.    multivitamin (THERAGRAN) per tablet Take 1 tablet by mouth once daily.    traMADol (ULTRAM) 50 mg tablet Take 1 tablet (50 mg total) by mouth every 6 (six) hours as needed for Pain.    clindamycin phosphate 1% (CLINDAGEL) 1 % gel Apply topically 2 (two) times daily.    denosumab (XGEVA) 120 mg/1.7 mL (70 mg/mL) Soln Inject 120 mg into the skin every 28 days.    NYSTATIN (DUKE'S SOLUTION) Take 10 mLs by mouth 4 (four) times daily as needed (mouth sores/pain).    ondansetron (ZOFRAN) 4 MG tablet Take 1 tablet (4 mg total) by mouth every 6 (six) hours as needed for Nausea.    sodium bicarbonate 650 MG tablet Take 1 tablet (650 mg total) by mouth 3 (three) times daily. Increase dose to 2 60 mg tabs by mouth three times daily.    [DISCONTINUED] levoFLOXacin (LEVAQUIN) 500 MG tablet TK 1 T PO  QD FOR 11 DAYS    [DISCONTINUED] meclizine (ANTIVERT) 12.5 mg tablet Take 1-2 tablets (12.5-25 mg total) by mouth 3 (three) times daily as needed for Dizziness.    [DISCONTINUED] ondansetron (ZOFRAN) 4 MG tablet Take 1 tablet (4 mg total) by mouth every 6 (six) hours.    [DISCONTINUED] ondansetron (ZOFRAN) 4 MG tablet Take 1 tablet (4 mg total) by mouth every 6 (six) hours as needed for Nausea.     "[DISCONTINUED] predniSONE (DELTASONE) 10 MG tablet Take 4 tablets (40 mg total) by mouth once daily. x7 days Then 2 tabs daily x7 days Then 1 tab daily x7 days. Then 1/2 tab daily x4 days       Review of patient's allergies indicates:   Allergen Reactions    Tobradex [tobramycin-dexamethasone] Swelling    Iodinated contrast- oral and iv dye Hives    Morphine Other (See Comments)     "shaking" and tremors    Latex Rash    Phenytoin sodium extended Other (See Comments) and Rash       Past Medical History:   Diagnosis Date    Anticoagulant long-term use     Blood clot in vein 06/2016    Breast cancer 1994    Cataract     Hypertension     Pancreatic cancer 1998    Posterior capsular opacification, left eye     Psychiatric problem     Seizures 01/25/2016    Stroke     Therapy      Past Surgical History:   Procedure Laterality Date    [id number] N/A 3/9/2016    Performed by [facility] Diagnostic Provider at [facility] OR [address] [place] [address]    BONE BIOSPY  3/9/16    BRAIN SURGERY  1/12/16    tumor removal 1/12/16    BREAST LUMPECTOMY  1998    left    BRONCHOSCOPY N/A 12/5/2017    Performed by Kiya Zhang MD at [facility] OR [facility]    CATARACT EXTRACTION Bilateral 2004    yag OU    CHOLECYSTECTOMY  1998    COLONOSCOPY N/A 11/13/2015    Procedure: COLONOSCOPY;  Surgeon: Alex Carmona MD;  Location: [facility] (4TH FLR);  Service: Endoscopy;  Laterality: N/A;  2 year f/u    COLONOSCOPY N/A 11/13/2015    Performed by Alex Carmona MD at [facility] (4TH FLR)    COLONOSCOPY N/A 9/26/2013    Performed by Gavin Prasad MD at [facility] (4TH FLR)    CRANIOTOMY WITH STEALTH Left 1/12/2016    Performed by Salty Ferrera MD at [facility] OR [facility]    cysto and right ureteral stent  4/27/12    ecoli  2010    removal of blockage, done throat, then through the liver.    ENDOBRONCHIAL ULTRASOUND (EBUS) N/A 12/5/2017    Performed by Kiya Zhang MD at [facility] OR [address] [place] [address]    ESOPHAGOGASTRODUODENOSCOPY (EGD) N/A " 3/14/2018    Performed by [facility] at [facility] (4TH FLR)    HYSTERECTOMY  1974    KIDNEY STONE SURGERY  --laser    left eswl  12    OOPHORECTOMY  2012    Laparoscopic BSO, lysis of adhesions, cystoscopy greater than 35mins     WHIPPLE PROCEDURE W/ LAPAROSCOPY       Family History     Problem Relation (Age of Onset)    Breast cancer Mother    Leukemia Paternal Grandfather    Lung cancer Mother    Stomach cancer Paternal Grandmother        Tobacco Use    Smoking status: Former Smoker     Packs/day: 0.00     Years: 0.00     Pack years: 0.00     Last attempt to quit: 1961     Years since quittin.0    Smokeless tobacco: Never Used   Substance and Sexual Activity    Alcohol use: No    Drug use: No    Sexual activity: Yes     Partners: Male     Review of Systems   Constitutional: Positive for activity change, appetite change and fatigue.   HENT: Negative.    Respiratory: Negative for cough, chest tightness and shortness of breath.    Cardiovascular: Negative for chest pain, palpitations and leg swelling.   Gastrointestinal: Positive for abdominal distention, nausea and vomiting. Negative for abdominal pain, constipation and diarrhea.   Endocrine: Negative.    Genitourinary: Negative.    Musculoskeletal: Negative.      Objective:     Vital Signs (Most Recent):  Temp: 98.8 °F (37.1 °C) (18)  Pulse: 82 (18)  Resp: 16 (18)  BP: (!) 155/89 (18)  SpO2: 97 % (18) Vital Signs (24h Range):  Temp:  [98.1 °F (36.7 °C)-98.8 °F (37.1 °C)] 98.8 °F (37.1 °C)  Pulse:  [82-99] 82  Resp:  [16-18] 16  SpO2:  [96 %-99 %] 97 %  BP: (142-192)/(75-92) 155/89     Weight: 59.9 kg (132 lb)  Body mass index is 21.97 kg/m².    Physical Exam     General: In no acute distress, well appearance.   CV: RRR, S1, S2 no murmur or gallops   Pulm: non labored breathing, b/l clear breath sounds.   Abd: soft, distended and tympanic, no tenderness. Chevron  incision well healed.   : No lazo in place.   Ext: wwp      Significant Labs:  CBC:   Recent Labs   Lab  09/21/18   0114   WBC  8.07   RBC  4.45   HGB  13.3   HCT  41.5   PLT  274   MCV  93   MCH  29.9   MCHC  32.0     CMP:   Recent Labs   Lab  09/21/18 0114   GLU  114*   CALCIUM  10.1   ALBUMIN  3.5   PROT  6.8   NA  144   K  3.6   CO2  23   CL  109   BUN  25*   CREATININE  1.6*   ALKPHOS  170*   ALT  73*   AST  36   BILITOT  2.3*       Significant Diagnostics:    1. Significant distension of the small and large bowel with fecalization of small bowel contents concerning for small bowel obstruction or ileus.  Significant fecal loading in the colon suggestive of constipation.  2. In this patient with history of pancreatic cancer, there are extensive postsurgical changes throughout the abdomen including Whipple procedure, gastrojejunostomy, and hepaticojejunostomy.  No evidence of residual or recurrent lesion in the surgical bed noting study is limited by noncontrast technique.  3. Small hiatal hernia containing ingested contents placing the patient at risk for aspiration.  4. Bilateral nonobstructing nephrolithiasis.  5. Additional findings as detailed above.  This report was flagged in Epic as abnormal.    Electronically signed by resident: Pablo Adams  Date: 09/21/2018  Time: 02:41    Persistent significant gaseous distension of small bowel with large volume fecal loading throughout the colon, suggestive of small bowel obstruction versus ileus.    Electronically signed by resident: Pablo Adams  Date: 09/21/2018  Time: 19:34      Assessment/Plan:     Abdominal pain    72 yo F with metastatic lung cancer, h/o whipple for pancreatic cancer with ileus vs sbo likely from constipation.      - Likely partial SBO vs ileus from severe constipation.   - Recommend placement of NGT as previous CT scan showed dilated stomach, patient is at risk of aspiration.   - Gastrograffin challenge.   - Obtain updated labs.   -  Enemas BID.   - Thank you for the consult, will continue to follow.    Lucia Mckoy MD  General Surgery PGY V  Beeper: [phone]                      VTE Risk Mitigation (From admission, onward)    None             I have personally performed a detailed history and physical examination on this patient. My findings are summarized in the resident's note included in the record.

## 2018-09-22 NOTE — ASSESSMENT & PLAN NOTE
S/p resection in 2016  Has been on keppra as seizure prophylaxis since  As pt NPO, switched from PO to IV keppra 500mg bid

## 2018-09-22 NOTE — ED NOTES
Telemetry Verification   Patient placed on Telemetry Box  Verified with War Room  Box # [id number]   Monitor Tech Roseline   Rate 91   Rhythm Normal Sinus

## 2018-09-22 NOTE — ASSESSMENT & PLAN NOTE
Patient takes ativan 1mg PO PRN  Per prior neurology note attempt to minimize ativan  Will hold off ativan for now, if needed may give ativan .5mg PRN for anxiety

## 2018-09-22 NOTE — SUBJECTIVE & OBJECTIVE
Current Facility-Administered Medications on File Prior to Encounter   Medication    denosumab (XGEVA) solution 120 mg    [COMPLETED] HYDROmorphone injection 0.5 mg    [COMPLETED] ondansetron injection 4 mg    [COMPLETED] sodium chloride 0.9% bolus 1,000 mL     Current Outpatient Medications on File Prior to Encounter   Medication Sig    amitriptyline (ELAVIL) 50 MG tablet Take 1 tablet (50 mg total) by mouth every evening.    amLODIPine (NORVASC) 5 MG tablet Take 1 tablet (5 mg total) by mouth once daily.    apixaban (ELIQUIS) 5 mg Tab TAKE 1 TABLET BY MOUTH TWO TIMES A DAY    atorvastatin (LIPITOR) 40 MG tablet Take 1 tablet (40 mg total) by mouth once daily.    CREON CpDR TAKE ONE CAPSULE BY MOUTH THREE TIMES A DAY WITH MEALS    dronabinol (MARINOL) 5 MG capsule Take 1 capsule (5 mg total) by mouth 2 (two) times daily before meals.    erlotinib (TARCEVA) 150 MG tablet Take 1 tablet (150 mg total) by mouth once daily.    levETIRAcetam (KEPPRA) 500 MG Tab Take 1 tablet (500 mg total) by mouth 2 (two) times daily.    LORazepam (ATIVAN) 1 MG tablet TAKE ONE TABLET BY MOUTH TWICE DAILY    losartan (COZAAR) 50 MG tablet Take 1 tablet (50 mg total) by mouth 2 (two) times daily.    metoprolol succinate (TOPROL-XL) 50 MG 24 hr tablet Take 1 tablet (50 mg total) by mouth once daily.    mirabegron 50 mg Tb24 Take 1 tablet (50 mg total) by mouth once daily.    multivitamin (THERAGRAN) per tablet Take 1 tablet by mouth once daily.    traMADol (ULTRAM) 50 mg tablet Take 1 tablet (50 mg total) by mouth every 6 (six) hours as needed for Pain.    clindamycin phosphate 1% (CLINDAGEL) 1 % gel Apply topically 2 (two) times daily.    denosumab (XGEVA) 120 mg/1.7 mL (70 mg/mL) Soln Inject 120 mg into the skin every 28 days.    NYSTATIN (DUKE'S SOLUTION) Take 10 mLs by mouth 4 (four) times daily as needed (mouth sores/pain).    ondansetron (ZOFRAN) 4 MG tablet Take 1 tablet (4 mg total) by mouth every 6 (six)  "hours as needed for Nausea.    sodium bicarbonate 650 MG tablet Take 1 tablet (650 mg total) by mouth 3 (three) times daily. Increase dose to 2 60 mg tabs by mouth three times daily.    [DISCONTINUED] levoFLOXacin (LEVAQUIN) 500 MG tablet TK 1 T PO  QD FOR 11 DAYS    [DISCONTINUED] meclizine (ANTIVERT) 12.5 mg tablet Take 1-2 tablets (12.5-25 mg total) by mouth 3 (three) times daily as needed for Dizziness.    [DISCONTINUED] ondansetron (ZOFRAN) 4 MG tablet Take 1 tablet (4 mg total) by mouth every 6 (six) hours.    [DISCONTINUED] ondansetron (ZOFRAN) 4 MG tablet Take 1 tablet (4 mg total) by mouth every 6 (six) hours as needed for Nausea.    [DISCONTINUED] predniSONE (DELTASONE) 10 MG tablet Take 4 tablets (40 mg total) by mouth once daily. x7 days Then 2 tabs daily x7 days Then 1 tab daily x7 days. Then 1/2 tab daily x4 days       Review of patient's allergies indicates:   Allergen Reactions    Tobradex [tobramycin-dexamethasone] Swelling    Iodinated contrast- oral and iv dye Hives    Morphine Other (See Comments)     "shaking" and tremors    Latex Rash    Phenytoin sodium extended Other (See Comments) and Rash       Past Medical History:   Diagnosis Date    Anticoagulant long-term use     Blood clot in vein 06/2016    Breast cancer 1994    Cataract     Hypertension     Pancreatic cancer 1998    Posterior capsular opacification, left eye     Psychiatric problem     Seizures 01/25/2016    Stroke     Therapy      Past Surgical History:   Procedure Laterality Date    [id number] N/A 3/9/2016    Performed by [facility] Diagnostic Provider at [facility] OR 2ND FLR    BONE BIOSPY  3/9/16    BRAIN SURGERY  1/12/16    tumor removal 1/12/16    BREAST LUMPECTOMY  1998    left    BRONCHOSCOPY N/A 12/5/2017    Performed by Kiya Zhang MD at [facility] OR 2ND FLR    CATARACT EXTRACTION Bilateral 2004    yag OU    CHOLECYSTECTOMY  1998    COLONOSCOPY N/A 11/13/2015    Procedure: COLONOSCOPY;  Surgeon: " Alex Carmona MD;  Location: [facility] (4TH FLR);  Service: Endoscopy;  Laterality: N/A;  2 year f/u    COLONOSCOPY N/A 2015    Performed by [facility] at [facility] ENDO (4TH FLR)    COLONOSCOPY N/A 2013    Performed by Gavin Prasad MD at [facility] (4TH FLR)    CRANIOTOMY WITH STEALTH Left 2016    Performed by Salty Ferrera MD at [facility] OR 2ND FLR    cysto and right ureteral stent  12    ecoli      removal of blockage, done throat, then through the liver.    ENDOBRONCHIAL ULTRASOUND (EBUS) N/A 2017    Performed by Kiya Zhang MD at [facility] OR 2ND FLR    ESOPHAGOGASTRODUODENOSCOPY (EGD) N/A 3/14/2018    Performed by Alex Carmona MD at [facility] (4TH FLR)    HYSTERECTOMY  1974    KIDNEY STONE SURGERY  --laser    left eswl  12    OOPHORECTOMY  2012    Laparoscopic BSO, lysis of adhesions, cystoscopy greater than 35mins     WHIPPLE PROCEDURE W/ LAPAROSCOPY       Family History     Problem Relation (Age of Onset)    Breast cancer Mother    Leukemia Paternal Grandfather    Lung cancer Mother    Stomach cancer Paternal Grandmother        Tobacco Use    Smoking status: Former Smoker     Packs/day: 0.00     Years: 0.00     Pack years: 0.00     Last attempt to quit: 1961     Years since quittin.0    Smokeless tobacco: Never Used   Substance and Sexual Activity    Alcohol use: No    Drug use: No    Sexual activity: Yes     Partners: Male     Review of Systems   Constitutional: Positive for activity change, appetite change and fatigue.   HENT: Negative.    Respiratory: Negative for cough, chest tightness and shortness of breath.    Cardiovascular: Negative for chest pain, palpitations and leg swelling.   Gastrointestinal: Positive for abdominal distention, nausea and vomiting. Negative for abdominal pain, constipation and diarrhea.   Endocrine: Negative.    Genitourinary: Negative.    Musculoskeletal: Negative.      Objective:      Vital Signs (Most Recent):  Temp: 98.8 °F (37.1 °C) (09/21/18 2021)  Pulse: 82 (09/21/18 2021)  Resp: 16 (09/21/18 2021)  BP: (!) 155/89 (09/21/18 2021)  SpO2: 97 % (09/21/18 2021) Vital Signs (24h Range):  Temp:  [98.1 °F (36.7 °C)-98.8 °F (37.1 °C)] 98.8 °F (37.1 °C)  Pulse:  [82-99] 82  Resp:  [16-18] 16  SpO2:  [96 %-99 %] 97 %  BP: (142-192)/(75-92) 155/89     Weight: 59.9 kg (132 lb)  Body mass index is 21.97 kg/m².    Physical Exam     General: In no acute distress, well appearance.   CV: RRR, S1, S2 no murmur or gallops   Pulm: non labored breathing, b/l clear breath sounds.   Abd: soft, distended and tympanic, no tenderness. Chevron incision well healed.   : No lazo in place.   Ext: wwp      Significant Labs:  CBC:   Recent Labs   Lab  09/21/18   0114   WBC  8.07   RBC  4.45   HGB  13.3   HCT  41.5   PLT  274   MCV  93   MCH  29.9   MCHC  32.0     CMP:   Recent Labs   Lab  09/21/18   0114   GLU  114*   CALCIUM  10.1   ALBUMIN  3.5   PROT  6.8   NA  144   K  3.6   CO2  23   CL  109   BUN  25*   CREATININE  1.6*   ALKPHOS  170*   ALT  73*   AST  36   BILITOT  2.3*       Significant Diagnostics:    1. Significant distension of the small and large bowel with fecalization of small bowel contents concerning for small bowel obstruction or ileus.  Significant fecal loading in the colon suggestive of constipation.  2. In this patient with history of pancreatic cancer, there are extensive postsurgical changes throughout the abdomen including Whipple procedure, gastrojejunostomy, and hepaticojejunostomy.  No evidence of residual or recurrent lesion in the surgical bed noting study is limited by noncontrast technique.  3. Small hiatal hernia containing ingested contents placing the patient at risk for aspiration.  4. Bilateral nonobstructing nephrolithiasis.  5. Additional findings as detailed above.  This report was flagged in Epic as abnormal.    Electronically signed by resident: Pablo Adams  Date:  09/21/2018  Time: 02:41    Persistent significant gaseous distension of small bowel with large volume fecal loading throughout the colon, suggestive of small bowel obstruction versus ileus.    Electronically signed by resident: Pablo Adams  Date: 09/21/2018  Time: 19:34

## 2018-09-22 NOTE — HPI
74 yo F with h/o metastatic lung cancer (adenocarcinoma, metastatic to brain and bone, diagnosed 1/2016, currently being treated with Xgeva and Tarceva), pancreatic cancer (s/p Whipple 17 years ago with Dr Platt) p/w abdominal pain and emesis. Pain started abruptly this evening, lower abdominal. Also has had multiple episodes of emesis. Reports normal BM yesterday. Denies decreased UOP. Did take a new probiotic Ensure type drink yesterday. Denies any previous small bowel obstruction.      Interval history    Patient states today she follow up with her PCP and had increased pain, anorexia and x1 emesis. She had x3 BM yesterday and has passed gas this morning.

## 2018-09-22 NOTE — ASSESSMENT & PLAN NOTE
KUB: There is mild gaseous distention of large and small bowel.  Moderate fecal loading in the left hemicolon.  CT abd/ pelvis Small hiatal hernia containing ingested contents placing the patient at risk for aspiration. significant distention of multiple loops of small bowel measuring up to 4.5 cm with fecalization of small bowel contents.  No definite transition point is identified, noting limitations from lack of IV or oral contrast.    NPO  Surgery consulted  NG tube placed  Per surgery Gastrogaffin challenge w/ serial x-rays at 1, 4 & 8 hours post gastrograffin administration w/ NG tube clamped for 4 hours then placed to low intermittent suction  Per surgery enemas bid  Caution w/ phosphate containing enemas given CKD  Recommend tap water enema  Monitor electrolytes

## 2018-09-22 NOTE — PROGRESS NOTES
Pt AAOx4 and independent with nonskid footwear on and bed locked and in lowest position with bed rails up x2. Call light is within reach and  is at the bedside. Heparin drip going at 18 u/kg/hr x 58.2 kg at 10/5 ml/hr. Pt with c/o mild facial pain and abdominal pressure with an urgency to have a BM. Pt received gastrografin via NGT at 0300 this AM. Abdominal xray performed twice thusfar. Pt also received 400cc tap water enema at 0600 this AM. No BM at this time. Pt currently NPO. No c/o nausea this shift. Remained afebrile throughout shift with no falls or injuries. All VSS. Will continue to monitor Pt.

## 2018-09-23 LAB
ALBUMIN SERPL BCP-MCNC: 2.7 G/DL
ALP SERPL-CCNC: 109 U/L
ALT SERPL W/O P-5'-P-CCNC: 41 U/L
ANION GAP SERPL CALC-SCNC: 11 MMOL/L
AST SERPL-CCNC: 42 U/L
BASOPHILS # BLD AUTO: 0.04 K/UL
BASOPHILS # BLD AUTO: 0.05 K/UL
BASOPHILS NFR BLD: 0.5 %
BASOPHILS NFR BLD: 0.6 %
BILIRUB SERPL-MCNC: 1.9 MG/DL
BUN SERPL-MCNC: 18 MG/DL
CALCIUM SERPL-MCNC: 7.9 MG/DL
CHLORIDE SERPL-SCNC: 115 MMOL/L
CO2 SERPL-SCNC: 19 MMOL/L
CREAT SERPL-MCNC: 1.2 MG/DL
DIFFERENTIAL METHOD: ABNORMAL
DIFFERENTIAL METHOD: ABNORMAL
EOSINOPHIL # BLD AUTO: 0.1 K/UL
EOSINOPHIL # BLD AUTO: 0.1 K/UL
EOSINOPHIL NFR BLD: 1.5 %
EOSINOPHIL NFR BLD: 1.6 %
ERYTHROCYTE [DISTWIDTH] IN BLOOD BY AUTOMATED COUNT: 13.7 %
ERYTHROCYTE [DISTWIDTH] IN BLOOD BY AUTOMATED COUNT: 13.9 %
EST. GFR  (AFRICAN AMERICAN): 51.4 ML/MIN/1.73 M^2
EST. GFR  (NON AFRICAN AMERICAN): 44.6 ML/MIN/1.73 M^2
GLUCOSE SERPL-MCNC: 63 MG/DL
HCT VFR BLD AUTO: 38.1 %
HCT VFR BLD AUTO: 39.9 %
HGB BLD-MCNC: 11.3 G/DL
HGB BLD-MCNC: 12.2 G/DL
IMM GRANULOCYTES # BLD AUTO: 0.04 K/UL
IMM GRANULOCYTES # BLD AUTO: 0.06 K/UL
IMM GRANULOCYTES NFR BLD AUTO: 0.5 %
IMM GRANULOCYTES NFR BLD AUTO: 0.8 %
LYMPHOCYTES # BLD AUTO: 1.9 K/UL
LYMPHOCYTES # BLD AUTO: 2.3 K/UL
LYMPHOCYTES NFR BLD: 25.6 %
LYMPHOCYTES NFR BLD: 27 %
MAGNESIUM SERPL-MCNC: 2.1 MG/DL
MCH RBC QN AUTO: 29.7 PG
MCH RBC QN AUTO: 29.8 PG
MCHC RBC AUTO-ENTMCNC: 29.7 G/DL
MCHC RBC AUTO-ENTMCNC: 30.6 G/DL
MCV RBC AUTO: 100 FL
MCV RBC AUTO: 97 FL
MONOCYTES # BLD AUTO: 0.9 K/UL
MONOCYTES # BLD AUTO: 1.2 K/UL
MONOCYTES NFR BLD: 12.1 %
MONOCYTES NFR BLD: 14.3 %
NEUTROPHILS # BLD AUTO: 4.4 K/UL
NEUTROPHILS # BLD AUTO: 4.8 K/UL
NEUTROPHILS NFR BLD: 56 %
NEUTROPHILS NFR BLD: 59.5 %
NRBC BLD-RTO: 0 /100 WBC
NRBC BLD-RTO: 0 /100 WBC
PHOSPHATE SERPL-MCNC: 2.7 MG/DL
PLATELET # BLD AUTO: 126 K/UL
PLATELET # BLD AUTO: 200 K/UL
PMV BLD AUTO: 10 FL
PMV BLD AUTO: 11.5 FL
POTASSIUM SERPL-SCNC: 4.4 MMOL/L
PROT SERPL-MCNC: 5.5 G/DL
RBC # BLD AUTO: 3.81 M/UL
RBC # BLD AUTO: 4.1 M/UL
SODIUM SERPL-SCNC: 145 MMOL/L
WBC # BLD AUTO: 7.37 K/UL
WBC # BLD AUTO: 8.62 K/UL

## 2018-09-23 PROCEDURE — 83735 ASSAY OF MAGNESIUM: CPT

## 2018-09-23 PROCEDURE — 84100 ASSAY OF PHOSPHORUS: CPT

## 2018-09-23 PROCEDURE — 85025 COMPLETE CBC W/AUTO DIFF WBC: CPT | Mod: 91

## 2018-09-23 PROCEDURE — 99233 SBSQ HOSP IP/OBS HIGH 50: CPT | Mod: ,,, | Performed by: INTERNAL MEDICINE

## 2018-09-23 PROCEDURE — 63600175 PHARM REV CODE 636 W HCPCS: Performed by: STUDENT IN AN ORGANIZED HEALTH CARE EDUCATION/TRAINING PROGRAM

## 2018-09-23 PROCEDURE — 25000003 PHARM REV CODE 250: Performed by: STUDENT IN AN ORGANIZED HEALTH CARE EDUCATION/TRAINING PROGRAM

## 2018-09-23 PROCEDURE — 20600001 HC STEP DOWN PRIVATE ROOM

## 2018-09-23 PROCEDURE — 36415 COLL VENOUS BLD VENIPUNCTURE: CPT

## 2018-09-23 PROCEDURE — 80053 COMPREHEN METABOLIC PANEL: CPT

## 2018-09-23 RX ORDER — ONDANSETRON 8 MG/1
8 TABLET, ORALLY DISINTEGRATING ORAL ONCE
Status: COMPLETED | OUTPATIENT
Start: 2018-09-23 | End: 2018-09-23

## 2018-09-23 RX ORDER — OXYCODONE HYDROCHLORIDE 5 MG/1
5 TABLET ORAL ONCE
Status: COMPLETED | OUTPATIENT
Start: 2018-09-23 | End: 2018-09-23

## 2018-09-23 RX ORDER — DRONABINOL 2.5 MG/1
5 CAPSULE ORAL
Status: DISCONTINUED | OUTPATIENT
Start: 2018-09-23 | End: 2018-09-23

## 2018-09-23 RX ORDER — TRAMADOL HYDROCHLORIDE 50 MG/1
50 TABLET ORAL EVERY 6 HOURS PRN
Status: DISCONTINUED | OUTPATIENT
Start: 2018-09-23 | End: 2018-09-24 | Stop reason: HOSPADM

## 2018-09-23 RX ORDER — ATORVASTATIN CALCIUM 20 MG/1
40 TABLET, FILM COATED ORAL DAILY
Status: DISCONTINUED | OUTPATIENT
Start: 2018-09-23 | End: 2018-09-24 | Stop reason: HOSPADM

## 2018-09-23 RX ORDER — LORAZEPAM 1 MG/1
1 TABLET ORAL 2 TIMES DAILY
Status: DISCONTINUED | OUTPATIENT
Start: 2018-09-23 | End: 2018-09-23

## 2018-09-23 RX ORDER — AMITRIPTYLINE HYDROCHLORIDE 25 MG/1
50 TABLET, FILM COATED ORAL NIGHTLY
Status: DISCONTINUED | OUTPATIENT
Start: 2018-09-23 | End: 2018-09-24 | Stop reason: HOSPADM

## 2018-09-23 RX ORDER — METOPROLOL SUCCINATE 50 MG/1
50 TABLET, EXTENDED RELEASE ORAL DAILY
Status: DISCONTINUED | OUTPATIENT
Start: 2018-09-23 | End: 2018-09-24 | Stop reason: HOSPADM

## 2018-09-23 RX ORDER — ENOXAPARIN SODIUM 100 MG/ML
90 INJECTION SUBCUTANEOUS
Status: DISCONTINUED | OUTPATIENT
Start: 2018-09-23 | End: 2018-09-23

## 2018-09-23 RX ORDER — ONDANSETRON 4 MG/1
4 TABLET, FILM COATED ORAL EVERY 6 HOURS PRN
Status: DISCONTINUED | OUTPATIENT
Start: 2018-09-23 | End: 2018-09-24 | Stop reason: HOSPADM

## 2018-09-23 RX ORDER — LOSARTAN POTASSIUM 50 MG/1
50 TABLET ORAL 2 TIMES DAILY
Status: DISCONTINUED | OUTPATIENT
Start: 2018-09-23 | End: 2018-09-24 | Stop reason: HOSPADM

## 2018-09-23 RX ORDER — AMLODIPINE BESYLATE 5 MG/1
5 TABLET ORAL DAILY
Status: DISCONTINUED | OUTPATIENT
Start: 2018-09-23 | End: 2018-09-24 | Stop reason: HOSPADM

## 2018-09-23 RX ORDER — ERLOTINIB HYDROCHLORIDE 150 MG/1
150 TABLET, FILM COATED ORAL DAILY
Status: DISCONTINUED | OUTPATIENT
Start: 2018-09-23 | End: 2018-09-24

## 2018-09-23 RX ORDER — LEVETIRACETAM 500 MG/1
500 TABLET ORAL 2 TIMES DAILY
Status: DISCONTINUED | OUTPATIENT
Start: 2018-09-23 | End: 2018-09-24 | Stop reason: HOSPADM

## 2018-09-23 RX ORDER — SODIUM BICARBONATE 650 MG/1
650 TABLET ORAL 3 TIMES DAILY
Status: DISCONTINUED | OUTPATIENT
Start: 2018-09-23 | End: 2018-09-24 | Stop reason: HOSPADM

## 2018-09-23 RX ADMIN — PANCRELIPASE 1 CAPSULE: 60000; 12000; 38000 CAPSULE, DELAYED RELEASE PELLETS ORAL at 04:09

## 2018-09-23 RX ADMIN — AMITRIPTYLINE HYDROCHLORIDE 50 MG: 25 TABLET, FILM COATED ORAL at 08:09

## 2018-09-23 RX ADMIN — OXYCODONE HYDROCHLORIDE 5 MG: 5 TABLET ORAL at 08:09

## 2018-09-23 RX ADMIN — PANCRELIPASE 1 CAPSULE: 60000; 12000; 38000 CAPSULE, DELAYED RELEASE PELLETS ORAL at 11:09

## 2018-09-23 RX ADMIN — METOPROLOL SUCCINATE 50 MG: 50 TABLET, EXTENDED RELEASE ORAL at 11:09

## 2018-09-23 RX ADMIN — LOSARTAN POTASSIUM 50 MG: 50 TABLET ORAL at 11:09

## 2018-09-23 RX ADMIN — TRAMADOL HYDROCHLORIDE 50 MG: 50 TABLET, FILM COATED ORAL at 05:09

## 2018-09-23 RX ADMIN — ERLOTINIB HYDROCHLORIDE 150 MG: 150 TABLET ORAL at 12:09

## 2018-09-23 RX ADMIN — LEVETIRACETAM 500 MG: 5 INJECTION INTRAVENOUS at 09:09

## 2018-09-23 RX ADMIN — LOSARTAN POTASSIUM 50 MG: 50 TABLET ORAL at 08:09

## 2018-09-23 RX ADMIN — LEVETIRACETAM 500 MG: 500 TABLET ORAL at 08:09

## 2018-09-23 RX ADMIN — AMLODIPINE BESYLATE 5 MG: 5 TABLET ORAL at 11:09

## 2018-09-23 RX ADMIN — ATORVASTATIN CALCIUM 40 MG: 20 TABLET, FILM COATED ORAL at 11:09

## 2018-09-23 RX ADMIN — SODIUM BICARBONATE 650 MG TABLET 650 MG: at 08:09

## 2018-09-23 RX ADMIN — ONDANSETRON 8 MG: 8 TABLET, ORALLY DISINTEGRATING ORAL at 08:09

## 2018-09-23 RX ADMIN — APIXABAN 5 MG: 2.5 TABLET, FILM COATED ORAL at 08:09

## 2018-09-23 RX ADMIN — APIXABAN 5 MG: 2.5 TABLET, FILM COATED ORAL at 11:09

## 2018-09-23 RX ADMIN — LEVETIRACETAM 500 MG: 500 TABLET ORAL at 11:09

## 2018-09-23 RX ADMIN — SODIUM BICARBONATE 650 MG TABLET 650 MG: at 02:09

## 2018-09-23 NOTE — PLAN OF CARE
Problem: Patient Care Overview  Goal: Plan of Care Review  Outcome: Ongoing (interventions implemented as appropriate)  Pt AAO; independent. Vital signs stable and pt remained afebrile throughout shift. Patient NG tube removed this  AM, no emesis, one BM reported this shift ,formed . Patient tolerating full liquid diet and wishes to stay with this diet at this time . PIV removed from right forearm due to swollen area above PIV. Kepra changed to PO form . Patient difficult IV stick , unable to use left arm due to limb alert . Physician notified . Bed lowest position and locked.  Call light in reach. Family at bedside all shift. Hourly rounding thru out shift

## 2018-09-23 NOTE — PROGRESS NOTES
Surgery    SBFT shows contrast in the colon.  The patient has since had 3 bowel movements and has no more nausea, despite NGT still being clamped.    Assessment: Resolved constipation vs resolved SBO     Rec:  I removed NGT this morning on rounds  OK for clears, and advance diet as tolerated from our standpoint  We will sign off, please page or call with further questions    Neto Bravo MD  General Surgery, PGY-5   [phone]

## 2018-09-23 NOTE — ASSESSMENT & PLAN NOTE
KUB: There is mild gaseous distention of large and small bowel.  Moderate fecal loading in the left hemicolon.  CT abd/ pelvis Small hiatal hernia containing ingested contents placing the patient at risk for aspiration. significant distention of multiple loops of small bowel measuring up to 4.5 cm with fecalization of small bowel contents.  No definite transition point is identified, noting limitations from lack of IV or oral contrast.    Surgery consulted  NG tube placed - removed 9/23  Per surgery Gastrogaffin challenge w/ serial x-rays at 1, 4 & 8 hours post gastrograffin administration w/ NG tube clamped for 4 hours then placed to low intermittent suction  Per surgery enemas bid  Caution w/ phosphate containing enemas given CKD  Recommend tap water enema  Monitor electrolytes

## 2018-09-23 NOTE — SUBJECTIVE & OBJECTIVE
Interval History: Pt had multiple bowel movements overnight. Pt states that she feels better. NG tube removed by surgery. Pt deneis any abdominal pain, nausea or vomiting since admission.     Oncology Treatment Plan:   [No treatment plan]    Medications:  Continuous Infusions:   sodium chloride 0.9% 100 mL/hr (09/22/18 1537)     Scheduled Meds:   levetiracetam IVPB  500 mg Intravenous Q12H    morphine  1 mg Intravenous Once     PRN Meds:dextrose 50%, dextrose 50%, glucagon (human recombinant), glucose, glucose, labetalol, ondansetron, sodium chloride 0.9%     Review of Systems   Constitutional: Negative for chills, fatigue and fever.   HENT: Positive for rhinorrhea. Negative for sinus pressure, sinus pain and sore throat.    Eyes: Negative for photophobia.   Respiratory: Positive for cough. Negative for shortness of breath.    Cardiovascular: Positive for palpitations and leg swelling. Negative for chest pain.   Gastrointestinal: Positive for abdominal distention, abdominal pain, nausea and vomiting. Negative for diarrhea.        No melena, BRBPR, hematemesis   Genitourinary: Negative for dysuria and hematuria.   Musculoskeletal: Negative for arthralgias, back pain and myalgias.   Skin: Negative for rash.   Neurological: Positive for dizziness, speech difficulty and numbness (feet). Negative for weakness, light-headedness and headaches.   Psychiatric/Behavioral: Positive for confusion. Negative for agitation.     Objective:     Vital Signs (Most Recent):  Temp: 98 °F (36.7 °C) (09/23/18 0431)  Pulse: 90 (09/23/18 0508)  Resp: 18 (09/23/18 0431)  BP: (!) 170/81 (09/23/18 0508)  SpO2: 95 % (09/23/18 0431) Vital Signs (24h Range):  Temp:  [97.4 °F (36.3 °C)-98.8 °F (37.1 °C)] 98 °F (36.7 °C)  Pulse:  [] 90  Resp:  [16-18] 18  SpO2:  [92 %-99 %] 95 %  BP: (141-182)/(77-93) 170/81     Weight: 59.9 kg (132 lb 0 oz)  Body mass index is 21.97 kg/m².  Body surface area is 1.66 meters squared.    No intake or output  data in the 24 hours ending 09/23/18 0730    Physical Exam   Constitutional: She is oriented to person, place, and time. No distress.   HENT:   Head: Normocephalic and atraumatic.   Mouth/Throat: No oropharyngeal exudate.   Surgical scar on scalp s/p meningioma resection   Eyes: Conjunctivae and EOM are normal. Pupils are equal, round, and reactive to light. No scleral icterus.   Neck: Normal range of motion. Neck supple.   Cardiovascular: Normal rate, regular rhythm and intact distal pulses.   No murmur heard.  Pulmonary/Chest: No respiratory distress. She has no wheezes. She has rales. She exhibits no tenderness.   Abdominal: Soft. She exhibits distension. There is no tenderness. There is no rebound and no guarding.   NG tube in place   Musculoskeletal: She exhibits no tenderness. Edema: trace b/l LE edema.   Lymphadenopathy:     She has no cervical adenopathy.   Neurological: She is alert and oriented to person, place, and time.   Skin: Skin is warm. No rash noted. She is not diaphoretic. No erythema.   Psychiatric: She has a normal mood and affect.       Significant Labs:   CBC:   Recent Labs   Lab  09/22/18   0555  09/23/18   0420  09/23/18   0652   WBC  8.75  7.37  8.62   HGB  12.0  12.2  11.3*   HCT  40.6  39.9  38.1   PLT  243  126*  200   , CMP:   Recent Labs   Lab  09/21/18   2214  09/22/18   0506  09/23/18   0420   NA  145  145  145   K  3.7  3.5  4.4   CL  110  111*  115*   CO2  25  22*  19*   GLU  110  122*  63*   BUN  21  20  18   CREATININE  1.4  1.3  1.2   CALCIUM  9.0  8.8  7.9*   PROT  6.3  6.1  5.5*   ALBUMIN  3.2*  3.1*  2.7*   BILITOT  2.3*  2.2*  1.9*   ALKPHOS  145*  137*  109   AST  37  34  42*   ALT  55*  53*  41   ANIONGAP  10  12  11   EGFRNONAA  37.0*  40.5*  44.6*    and All pertinent labs from the last 24 hours have been reviewed.    Diagnostic Results:  I have reviewed all pertinent imaging results/findings within the past 24 hours.

## 2018-09-23 NOTE — PLAN OF CARE
Problem: Patient Care Overview  Goal: Plan of Care Review  Outcome: Ongoing (interventions implemented as appropriate)  Pt AAO; independent. Vital signs stable and pt remained afebrile throughout shift. .  Pt receiving ANTIBIOTICS.  IV FLUIDS at !00ml/hr NS .Heparin drip discontinued. Lovenox started.    Shivam Bomd enema  Given this shift with fair results, BM able to be obtained Pt remained free from falls during shift.  Bed lowest position and locked.  Call light in reach.  \patient left in care

## 2018-09-23 NOTE — PROGRESS NOTES
[facility]  Hematology/Oncology  Progress Note    Patient Name: Naty St  Admission Date: 9/21/2018  Hospital Length of Stay: 2 days  Code Status: Full Code     Subjective:     HPI:  74 y/o F PMhx R lung adenocarcinoma on Xgeva and Tarceva, breast ca s/p L lumpectomy, meningioma s/p resection, pancreatic ca s/p whipple, and s/p hysterectomy w/ b/l salpingo-oophorectomy, HFpEF, HTN who presents w/ abd pain, nausea, emesis. Patient initially presented to ED yesterday evening after having lower mid-line abd pain & a bout of bilious emesis. In ED she was given zofran, dilaudid and 1 L NS. CT abd/ pelvis demonstrated significant distention of multiple loops of small bowel measuring up to 4.5 cm with fecalization of small bowel contents & small hiatal hernia. She was subsequently discharged w/ IM clinic f/u today who referred her to the ED. She states that prior to initial ED visit she had 3 medium sized BM, however, has had none since that time and denies passing flatus. Additionally, she states abd pain slightly improved after bout of emesis. She endorses n/v, abd pain. She denies BRBPR, melena, hematemesis, hematochezia, chest pain, SOB.    Oncologic History per Dr. Vazquez's last clinic note:  had a meningioma resection done in the past; however, recently, underwent neurosurgical resection with Dr. 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    Additional immunohistochemical stains were performed which revealed the tumor cells to be are positive for TTF1 and negative for ER and GCDFP. The morphology and TTF1 positivity are most consistent with lung primary.    Of note, imaging scan at the end of January 2016 revealed a mass in the lung at 2 cm in the medial aspect of the apical segment of the right upper lobe abutting the mediastinum at the level of the azygous vein and  "abutting and possibly encasing the segmental bronchi and vessels of the apical segment of the right upper lobe and no pleural fluid was present. Also, there is an enlarged right paratracheal lymph node. No evidence of any metastatic disease at the pancreatic site with postoperative changes post Whipple disease  Her PET Scan from 2/15/16 reveal "Hypermetabolic mass in the right lung apex consistent with a primary malignancy. Hypermetabolic mediastinal lymph nodes consistent with metastatic disease. Right sacral hypermetabolic lesion consistent with metastatic disease, noting additional mildly sclerotic lesions in multiple vertebral bodies which do not demonstrate abnormal hypermetabolism  She underwent IR bone biopsy which revealed metastatic adenocarcinoma of lung origin. 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  She has completed focal RT to brain lesions in March 2016.    PET scan from 6/6/16 shows "Dramatic almost complete response to therapy."  Lovenox started after US lower ext 6/7/16 shows Acute complete occlusion of one of the left posterior tibial vein.Remote partial thrombus of the proximal left superficial femoral vein.  She is on Tarceva.   12/6/16 PET scan reveals "Stable right upper lobe lesion and right hilar lymph node. No new lesions identified. Trace left pleural effusion".  1/27/17 Renal u/s "Medical renal disease. Nonobstructive right nephrolithiasis"  1/31/17 PET - "Right upper lobe nodule and right hilar lymph node similar and very low grade activity.  There is no definite evidence of recurrence."   11/9/17 PET "In this patient with history of lung cancer, there is interval increase in size and hypermetabolism of a right upper lobe lung lesion concerning for recurrent disease. Additionally, there is interval appearance of a hypermetabolic paratracheal lymph node suspicious for metastatic disease"     She progressed on Tarceva She underwent EBUS on 12/5/17. Pathology revealed adenocarcinoma but " "T790m could not be done as quantity was not insufficient. Her PET scan from 1/30/18 revealed The patient's previously identified abnormal lesions is slightly greater uptake of FDG on today's study compared with prior exam. These findings are concerning for progression of disease"      She was hospitalized between 4/9/18-4/16/18. Her hospital course was as follows "Patient was admitted to hemonc service on 4/9 with acute hypoxic respiratory failure. She was requiring 4 L of nc on admission. She was started on moxi for CAP. She received one dose of ceftriaxone in the ED. On 2nd day of admission she spiked a fever. Her Hb was ~7 g/dl so she was transfused 1 unit of PRBCs. Over night she developed worsening of her symptoms with increased O2 requirements to 15 L HFNC. Her CXR showed worsening congestion. There was a concern of TRALI vs TACO. New 2D echo with diastolic dysfunction. She was given IV lasix pushes as needed and was started on dexamethasone.  Her abx was escalated to vanc and cefepime. She improved with diuresis and steroids. Pulmonary were consulted. Her O2 requirements continued to improve. On 4/15 she was switched to Augmentin. PT recommended discharging her to SNF but the patient refused and she wanted to go home with home health. She qualified for home oxygen so she was discharged on 3 L nc while at rest and 6 while active. Augmentin and dexamethasone were discontinued on discharge"     She was readmitted from 4/27 to 5/3/18 with who presented to the ED with a complaint of fatigue and worsening DELA CRUZ. Pt diagnosed PNA 4/9 and was discharged on 4/16 on 3L oxygen. She was initially placed on cefepime for 3 days followed by an add'l 2 days of Augmentin. Pulm was consulted with assistance as her oxygen requirements were increasing. She was placed on oral steroids, then trailed on 80mg TID solumedrol for 2 days and will be discharged on a prednisone taper. She was also diuresed with 2 days of 40mg IV lasix with " "appropriate UOP resulting, improvement on repeat CXR. She was medically stable and appropriate for DC home with home health on 1L continuous O2. She will be seen for close f/u and may be able to come off oxygen at that time.      She discontinued Tagrisso in April 2018. Restaging scans from 6/4/18 revealed "Stable appearance of a spiculated right upper lobe pulmonary lesion.  Additional irregular focus superior to the lesion in the right lung apex is stable and may represent scar or additional lesion; although this was not hypermetabolic on prior PET-CT.  No new pulmonary lesions. 1.3 cm subcarinal lymph node, not hypermetabolic on prior PET-CT. Stable postsurgical changes of a Whipple procedure. Bilateral nonobstructing nephrolithiasis.  Stable sclerotic focus in the T5 vertebral body, possibly a bone island as this was not hypermetabolic on prior PET-CT. Small hiatal hernia. RECIST SUMMARY: Date of prior examination for comparison 01/30/2018 Lesion 1: Right upper lobe 1.3 cm Series 2 image 31 prior measurement 1.3 cm"  Bone scan also from 6/4/18 revealed no evidence of mets.     Her PET scan from 7/11/18 revealed "Right suprahilar lesion SUV max 3.76, previously 6.86. Pretracheal lymph node SUV max 2.55, previously 3.79. There is physiologic intracranial, head, and neck activity.  There is muscle activation.  There is vocal cord activation.  There is physiologic liver, spleen, GI and  activity.  There is a hiatal hernia.  Pelvic organs show nothing unusual.  No bone lesions are seen.  There are areas of benign appearing lung scar"  She notes fatigue.  She denies any nausea, vomiting, diarrhea, constipation, abdominal pain, weight loss or loss of appetite, chest pain, shortness of breath, leg swelling, fatigue, pain, headache, dizziness, or mood changes. Her ECOG PS is 2. She is accompanied by her  and son     She has been on Tarceva since 6/5/18    Interval History: Pt had multiple bowel movements " overnight. Pt states that she feels better. NG tube removed by surgery. Pt deneis any abdominal pain, nausea or vomiting since admission.     Oncology Treatment Plan:   [No treatment plan]    Medications:  Continuous Infusions:   sodium chloride 0.9% 100 mL/hr (09/22/18 1537)     Scheduled Meds:   levetiracetam IVPB  500 mg Intravenous Q12H    morphine  1 mg Intravenous Once     PRN Meds:dextrose 50%, dextrose 50%, glucagon (human recombinant), glucose, glucose, labetalol, ondansetron, sodium chloride 0.9%     Review of Systems   Constitutional: Negative for chills, fatigue and fever.   HENT: Positive for rhinorrhea. Negative for sinus pressure, sinus pain and sore throat.    Eyes: Negative for photophobia.   Respiratory: Positive for cough. Negative for shortness of breath.    Cardiovascular: Positive for palpitations and leg swelling. Negative for chest pain.   Gastrointestinal: Positive for abdominal distention, abdominal pain, nausea and vomiting. Negative for diarrhea.        No melena, BRBPR, hematemesis   Genitourinary: Negative for dysuria and hematuria.   Musculoskeletal: Negative for arthralgias, back pain and myalgias.   Skin: Negative for rash.   Neurological: Positive for dizziness, speech difficulty and numbness (feet). Negative for weakness, light-headedness and headaches.   Psychiatric/Behavioral: Positive for confusion. Negative for agitation.     Objective:     Vital Signs (Most Recent):  Temp: 98 °F (36.7 °C) (09/23/18 0431)  Pulse: 90 (09/23/18 0508)  Resp: 18 (09/23/18 0431)  BP: (!) 170/81 (09/23/18 0508)  SpO2: 95 % (09/23/18 0431) Vital Signs (24h Range):  Temp:  [97.4 °F (36.3 °C)-98.8 °F (37.1 °C)] 98 °F (36.7 °C)  Pulse:  [] 90  Resp:  [16-18] 18  SpO2:  [92 %-99 %] 95 %  BP: (141-182)/(77-93) 170/81     Weight: 59.9 kg (132 lb 0 oz)  Body mass index is 21.97 kg/m².  Body surface area is 1.66 meters squared.    No intake or output data in the 24 hours ending 09/23/18  0730    Physical Exam   Constitutional: She is oriented to person, place, and time. No distress.   HENT:   Head: Normocephalic and atraumatic.   Mouth/Throat: No oropharyngeal exudate.   Surgical scar on scalp s/p meningioma resection   Eyes: Conjunctivae and EOM are normal. Pupils are equal, round, and reactive to light. No scleral icterus.   Neck: Normal range of motion. Neck supple.   Cardiovascular: Normal rate, regular rhythm and intact distal pulses.   No murmur heard.  Pulmonary/Chest: No respiratory distress. She has no wheezes. She has rales. She exhibits no tenderness.   Abdominal: Soft. She exhibits distension. There is no tenderness. There is no rebound and no guarding.   NG tube in place   Musculoskeletal: She exhibits no tenderness. Edema: trace b/l LE edema.   Lymphadenopathy:     She has no cervical adenopathy.   Neurological: She is alert and oriented to person, place, and time.   Skin: Skin is warm. No rash noted. She is not diaphoretic. No erythema.   Psychiatric: She has a normal mood and affect.       Significant Labs:   CBC:   Recent Labs   Lab  09/22/18   0555  09/23/18   0420  09/23/18   0652   WBC  8.75  7.37  8.62   HGB  12.0  12.2  11.3*   HCT  40.6  39.9  38.1   PLT  243  126*  200   , CMP:   Recent Labs   Lab  09/21/18   2214  09/22/18   0506  09/23/18   0420   NA  145  145  145   K  3.7  3.5  4.4   CL  110  111*  115*   CO2  25  22*  19*   GLU  110  122*  63*   BUN  21  20  18   CREATININE  1.4  1.3  1.2   CALCIUM  9.0  8.8  7.9*   PROT  6.3  6.1  5.5*   ALBUMIN  3.2*  3.1*  2.7*   BILITOT  2.3*  2.2*  1.9*   ALKPHOS  145*  137*  109   AST  37  34  42*   ALT  55*  53*  41   ANIONGAP  10  12  11   EGFRNONAA  37.0*  40.5*  44.6*    and All pertinent labs from the last 24 hours have been reviewed.    Diagnostic Results:  I have reviewed all pertinent imaging results/findings within the past 24 hours.    Assessment/Plan:     * SBO (small bowel obstruction)    KUB: There is mild gaseous  distention of large and small bowel.  Moderate fecal loading in the left hemicolon.  CT abd/ pelvis Small hiatal hernia containing ingested contents placing the patient at risk for aspiration. significant distention of multiple loops of small bowel measuring up to 4.5 cm with fecalization of small bowel contents.  No definite transition point is identified, noting limitations from lack of IV or oral contrast.    Surgery consulted  NG tube placed - removed 9/23  Per surgery Gastrogaffin challenge w/ serial x-rays at 1, 4 & 8 hours post gastrograffin administration w/ NG tube clamped for 4 hours then placed to low intermittent suction  Per surgery enemas bid  Caution w/ phosphate containing enemas given CKD  Recommend tap water enema  Monitor electrolytes        History of deep vein thrombosis (DVT) of lower extremity    Has been on chronic anticoagulation since 2016 following DVT LLE  On eliquis 5mg bid (pt w/ documented weight 59.9kg, creatinine 1.4, age 73) however pt NPO  Lovenox 90  Once resolution of SBO can restart eliquis although dose adjustment may be needed based on weight and creatinine        Anxiety    Patient takes ativan 1mg PO PRN  Per prior neurology note attempt to minimize ativan  Will hold off ativan for now, if needed may give ativan .5mg PRN for anxiety        Insomnia    Per last neurology note decrease elavil to 25mg qhs from 50mg          Dyslipidemia    Clears Diet  Holding home atorvastatin for now        Stage 3 chronic kidney disease    Monitor renal function         Malignant neoplasm of lower lobe of right lung    Adenocarcinoma of R lung   On Xgeva and Tarceva  Last chemo session 9/18        Meningioma    S/p resection in 2016  Has been on keppra as seizure prophylaxis since  As pt NPO, switched from PO to IV keppra 500mg bid        Essential hypertension    Holding home BP meds  labetelol 10mg PRN for SBP >180                 Poncho Jc MD  Hematology/Oncology  [facility]  [place]          ATTENDING NOTE, ONCOLOGY INPATIENT TEAM    As above; events of last 24 hours noted.  Patient seen and examined, chart reviewed.  Appears comfortable, in NAD.  She had had multiple bowel movements since yesterday.  Lungs are clear to auscultation.  Abdomen is soft, nontender.  Bowel sounds are present  Labs reviewed.    PLAN  Start clear liquid diet today, advance as tolerated.  May restart;y Eloquis  Tentative discharge in am.  Patient is ambulatory and advised to remain so.      Og Pierce MD

## 2018-09-23 NOTE — ASSESSMENT & PLAN NOTE
Has been on chronic anticoagulation since 2016 following DVT LLE  On eliquis 5mg bid (pt w/ documented weight 59.9kg, creatinine 1.4, age 73) however pt NPO  Lovenox 90  Once resolution of SBO can restart eliquis although dose adjustment may be needed based on weight and creatinine

## 2018-09-24 VITALS
OXYGEN SATURATION: 100 % | DIASTOLIC BLOOD PRESSURE: 62 MMHG | HEIGHT: 65 IN | WEIGHT: 132 LBS | BODY MASS INDEX: 21.99 KG/M2 | TEMPERATURE: 98 F | RESPIRATION RATE: 19 BRPM | HEART RATE: 86 BPM | SYSTOLIC BLOOD PRESSURE: 118 MMHG

## 2018-09-24 LAB
ALBUMIN SERPL BCP-MCNC: 3.1 G/DL
ALP SERPL-CCNC: 127 U/L
ALT SERPL W/O P-5'-P-CCNC: 45 U/L
ANION GAP SERPL CALC-SCNC: 11 MMOL/L
AST SERPL-CCNC: 43 U/L
BASOPHILS # BLD AUTO: 0.02 K/UL
BASOPHILS NFR BLD: 0.3 %
BILIRUB SERPL-MCNC: 2 MG/DL
BUN SERPL-MCNC: 14 MG/DL
CALCIUM SERPL-MCNC: 8.3 MG/DL
CHLORIDE SERPL-SCNC: 111 MMOL/L
CO2 SERPL-SCNC: 19 MMOL/L
CREAT SERPL-MCNC: 1.1 MG/DL
DIFFERENTIAL METHOD: ABNORMAL
EOSINOPHIL # BLD AUTO: 0.1 K/UL
EOSINOPHIL NFR BLD: 1.6 %
ERYTHROCYTE [DISTWIDTH] IN BLOOD BY AUTOMATED COUNT: 13.3 %
EST. GFR  (AFRICAN AMERICAN): 57.2 ML/MIN/1.73 M^2
EST. GFR  (NON AFRICAN AMERICAN): 49.6 ML/MIN/1.73 M^2
GLUCOSE SERPL-MCNC: 92 MG/DL
HCT VFR BLD AUTO: 44.8 %
HGB BLD-MCNC: 13.6 G/DL
IMM GRANULOCYTES # BLD AUTO: 0.03 K/UL
IMM GRANULOCYTES NFR BLD AUTO: 0.4 %
LYMPHOCYTES # BLD AUTO: 1.4 K/UL
LYMPHOCYTES NFR BLD: 19.6 %
MAGNESIUM SERPL-MCNC: 2 MG/DL
MCH RBC QN AUTO: 30.2 PG
MCHC RBC AUTO-ENTMCNC: 30.4 G/DL
MCV RBC AUTO: 100 FL
MONOCYTES # BLD AUTO: 0.7 K/UL
MONOCYTES NFR BLD: 10.5 %
NEUTROPHILS # BLD AUTO: 4.8 K/UL
NEUTROPHILS NFR BLD: 67.6 %
NRBC BLD-RTO: 0 /100 WBC
PHOSPHATE SERPL-MCNC: 2.3 MG/DL
PLATELET # BLD AUTO: 196 K/UL
PMV BLD AUTO: 10.4 FL
POTASSIUM SERPL-SCNC: 4.5 MMOL/L
PROT SERPL-MCNC: 6.3 G/DL
RBC # BLD AUTO: 4.5 M/UL
SODIUM SERPL-SCNC: 141 MMOL/L
WBC # BLD AUTO: 7.03 K/UL

## 2018-09-24 PROCEDURE — 99238 HOSP IP/OBS DSCHRG MGMT 30/<: CPT | Mod: GC,,, | Performed by: INTERNAL MEDICINE

## 2018-09-24 PROCEDURE — 25000003 PHARM REV CODE 250: Performed by: STUDENT IN AN ORGANIZED HEALTH CARE EDUCATION/TRAINING PROGRAM

## 2018-09-24 PROCEDURE — 85025 COMPLETE CBC W/AUTO DIFF WBC: CPT

## 2018-09-24 PROCEDURE — 83735 ASSAY OF MAGNESIUM: CPT

## 2018-09-24 PROCEDURE — 80053 COMPREHEN METABOLIC PANEL: CPT

## 2018-09-24 PROCEDURE — 36415 COLL VENOUS BLD VENIPUNCTURE: CPT

## 2018-09-24 PROCEDURE — 84100 ASSAY OF PHOSPHORUS: CPT

## 2018-09-24 RX ORDER — ERLOTINIB HYDROCHLORIDE 150 MG/1
150 TABLET, FILM COATED ORAL DAILY
Qty: 30 TABLET | Refills: 2
Start: 2018-09-24 | End: 2018-10-31 | Stop reason: SDUPTHER

## 2018-09-24 RX ORDER — ONDANSETRON 8 MG/1
8 TABLET, ORALLY DISINTEGRATING ORAL ONCE
Status: DISCONTINUED | OUTPATIENT
Start: 2018-09-24 | End: 2018-09-24 | Stop reason: HOSPADM

## 2018-09-24 RX ORDER — AMOXICILLIN 250 MG
1 CAPSULE ORAL DAILY
Start: 2018-09-24 | End: 2020-01-01

## 2018-09-24 RX ADMIN — PANCRELIPASE 1 CAPSULE: 60000; 12000; 38000 CAPSULE, DELAYED RELEASE PELLETS ORAL at 12:09

## 2018-09-24 RX ADMIN — SODIUM BICARBONATE 650 MG TABLET 650 MG: at 08:09

## 2018-09-24 RX ADMIN — PANCRELIPASE 1 CAPSULE: 60000; 12000; 38000 CAPSULE, DELAYED RELEASE PELLETS ORAL at 08:09

## 2018-09-24 RX ADMIN — LEVETIRACETAM 500 MG: 500 TABLET ORAL at 08:09

## 2018-09-24 RX ADMIN — LOSARTAN POTASSIUM 50 MG: 50 TABLET ORAL at 08:09

## 2018-09-24 RX ADMIN — ATORVASTATIN CALCIUM 40 MG: 20 TABLET, FILM COATED ORAL at 08:09

## 2018-09-24 RX ADMIN — AMLODIPINE BESYLATE 5 MG: 5 TABLET ORAL at 08:09

## 2018-09-24 RX ADMIN — APIXABAN 5 MG: 2.5 TABLET, FILM COATED ORAL at 08:09

## 2018-09-24 RX ADMIN — SODIUM BICARBONATE 650 MG TABLET 650 MG: at 03:09

## 2018-09-24 RX ADMIN — PANCRELIPASE 1 CAPSULE: 60000; 12000; 38000 CAPSULE, DELAYED RELEASE PELLETS ORAL at 05:09

## 2018-09-24 RX ADMIN — METOPROLOL SUCCINATE 50 MG: 50 TABLET, EXTENDED RELEASE ORAL at 08:09

## 2018-09-24 NOTE — NURSING
Spoke to physician Arriaza regarding lack of IV access for this patient . PICC line consult placed for midline

## 2018-09-24 NOTE — PROGRESS NOTES
[facility]  Hematology/Oncology  Progress Note    Patient Name: Naty St  Admission Date: 9/21/2018  Hospital Length of Stay: 3 days  Code Status: Full Code     Subjective:     HPI:  74 y/o F PMhx R lung adenocarcinoma on Xgeva and Tarceva, breast ca s/p L lumpectomy, meningioma s/p resection, pancreatic ca s/p whipple, and s/p hysterectomy w/ b/l salpingo-oophorectomy, HFpEF, HTN who presents w/ abd pain, nausea, emesis. Patient initially presented to ED yesterday evening after having lower mid-line abd pain & a bout of bilious emesis. In ED she was given zofran, dilaudid and 1 L NS. CT abd/ pelvis demonstrated significant distention of multiple loops of small bowel measuring up to 4.5 cm with fecalization of small bowel contents & small hiatal hernia. She was subsequently discharged w/ IM clinic f/u today who referred her to the ED. She states that prior to initial ED visit she had 3 medium sized BM, however, has had none since that time and denies passing flatus. Additionally, she states abd pain slightly improved after bout of emesis. She endorses n/v, abd pain. She denies BRBPR, melena, hematemesis, hematochezia, chest pain, SOB.    Oncologic History per Dr. Vazquez's last clinic note:  had a meningioma resection done in the past; however, recently, underwent neurosurgical resection with Dr. 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    Additional immunohistochemical stains were performed which revealed the tumor cells to be are positive for TTF1 and negative for ER and GCDFP. The morphology and TTF1 positivity are most consistent with lung primary.    Of note, imaging scan at the end of January 2016 revealed a mass in the lung at 2 cm in the medial aspect of the apical segment of the right upper lobe abutting the mediastinum at the level of the azygous vein and  "abutting and possibly encasing the segmental bronchi and vessels of the apical segment of the right upper lobe and no pleural fluid was present. Also, there is an enlarged right paratracheal lymph node. No evidence of any metastatic disease at the pancreatic site with postoperative changes post Whipple disease  Her PET Scan from 2/15/16 reveal "Hypermetabolic mass in the right lung apex consistent with a primary malignancy. Hypermetabolic mediastinal lymph nodes consistent with metastatic disease. Right sacral hypermetabolic lesion consistent with metastatic disease, noting additional mildly sclerotic lesions in multiple vertebral bodies which do not demonstrate abnormal hypermetabolism  She underwent IR bone biopsy which revealed metastatic adenocarcinoma of lung origin. 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  She has completed focal RT to brain lesions in March 2016.    PET scan from 6/6/16 shows "Dramatic almost complete response to therapy."  Lovenox started after US lower ext 6/7/16 shows Acute complete occlusion of one of the left posterior tibial vein.Remote partial thrombus of the proximal left superficial femoral vein.  She is on Tarceva.   12/6/16 PET scan reveals "Stable right upper lobe lesion and right hilar lymph node. No new lesions identified. Trace left pleural effusion".  1/27/17 Renal u/s "Medical renal disease. Nonobstructive right nephrolithiasis"  1/31/17 PET - "Right upper lobe nodule and right hilar lymph node similar and very low grade activity.  There is no definite evidence of recurrence."   11/9/17 PET "In this patient with history of lung cancer, there is interval increase in size and hypermetabolism of a right upper lobe lung lesion concerning for recurrent disease. Additionally, there is interval appearance of a hypermetabolic paratracheal lymph node suspicious for metastatic disease"     She progressed on Tarceva She underwent EBUS on 12/5/17. Pathology revealed adenocarcinoma but " "T790m could not be done as quantity was not insufficient. Her PET scan from 1/30/18 revealed The patient's previously identified abnormal lesions is slightly greater uptake of FDG on today's study compared with prior exam. These findings are concerning for progression of disease"      She was hospitalized between 4/9/18-4/16/18. Her hospital course was as follows "Patient was admitted to hemonc service on 4/9 with acute hypoxic respiratory failure. She was requiring 4 L of nc on admission. She was started on moxi for CAP. She received one dose of ceftriaxone in the ED. On 2nd day of admission she spiked a fever. Her Hb was ~7 g/dl so she was transfused 1 unit of PRBCs. Over night she developed worsening of her symptoms with increased O2 requirements to 15 L HFNC. Her CXR showed worsening congestion. There was a concern of TRALI vs TACO. New 2D echo with diastolic dysfunction. She was given IV lasix pushes as needed and was started on dexamethasone.  Her abx was escalated to vanc and cefepime. She improved with diuresis and steroids. Pulmonary were consulted. Her O2 requirements continued to improve. On 4/15 she was switched to Augmentin. PT recommended discharging her to SNF but the patient refused and she wanted to go home with home health. She qualified for home oxygen so she was discharged on 3 L nc while at rest and 6 while active. Augmentin and dexamethasone were discontinued on discharge"     She was readmitted from 4/27 to 5/3/18 with who presented to the ED with a complaint of fatigue and worsening DELA CRUZ. Pt diagnosed PNA 4/9 and was discharged on 4/16 on 3L oxygen. She was initially placed on cefepime for 3 days followed by an add'l 2 days of Augmentin. Pulm was consulted with assistance as her oxygen requirements were increasing. She was placed on oral steroids, then trailed on 80mg TID solumedrol for 2 days and will be discharged on a prednisone taper. She was also diuresed with 2 days of 40mg IV lasix with " "appropriate UOP resulting, improvement on repeat CXR. She was medically stable and appropriate for DC home with home health on 1L continuous O2. She will be seen for close f/u and may be able to come off oxygen at that time.      She discontinued Tagrisso in April 2018. Restaging scans from 6/4/18 revealed "Stable appearance of a spiculated right upper lobe pulmonary lesion.  Additional irregular focus superior to the lesion in the right lung apex is stable and may represent scar or additional lesion; although this was not hypermetabolic on prior PET-CT.  No new pulmonary lesions. 1.3 cm subcarinal lymph node, not hypermetabolic on prior PET-CT. Stable postsurgical changes of a Whipple procedure. Bilateral nonobstructing nephrolithiasis.  Stable sclerotic focus in the T5 vertebral body, possibly a bone island as this was not hypermetabolic on prior PET-CT. Small hiatal hernia. RECIST SUMMARY: Date of prior examination for comparison 01/30/2018 Lesion 1: Right upper lobe 1.3 cm Series 2 image 31 prior measurement 1.3 cm"  Bone scan also from 6/4/18 revealed no evidence of mets.     Her PET scan from 7/11/18 revealed "Right suprahilar lesion SUV max 3.76, previously 6.86. Pretracheal lymph node SUV max 2.55, previously 3.79. There is physiologic intracranial, head, and neck activity.  There is muscle activation.  There is vocal cord activation.  There is physiologic liver, spleen, GI and  activity.  There is a hiatal hernia.  Pelvic organs show nothing unusual.  No bone lesions are seen.  There are areas of benign appearing lung scar"  She notes fatigue.  She denies any nausea, vomiting, diarrhea, constipation, abdominal pain, weight loss or loss of appetite, chest pain, shortness of breath, leg swelling, fatigue, pain, headache, dizziness, or mood changes. Her ECOG PS is 2. She is accompanied by her  and son     She has been on Tarceva since 6/5/18    Interval History: Pt had one bowel movement overnight and " one episode of emesis. Pt states that she feels better. NG tube removed by surgery. Pt deneis any abdominal pain or nausea since admission.     Oncology Treatment Plan:   [No treatment plan]    Medications:  Continuous Infusions:   sodium chloride 0.9% 100 mL/hr (09/22/18 1537)     Scheduled Meds:   amitriptyline  50 mg Oral QHS    amLODIPine  5 mg Oral Daily    apixaban  5 mg Oral BID    atorvastatin  40 mg Oral Daily    levETIRAcetam  500 mg Oral BID    lipase-protease-amylase 12,000-38,000-60,000 units  1 capsule Oral TID WM    losartan  50 mg Oral BID    metoprolol succinate  50 mg Oral Daily    morphine  1 mg Intravenous Once    ondansetron  8 mg Oral Once    sodium bicarbonate  650 mg Oral TID     PRN Meds:dextrose 50%, dextrose 50%, duke's soln (benadryl 30 mL, mylanta 30 mL, lidocane 30 mL, nystatin 30 mL) 120 mL, glucagon (human recombinant), glucose, glucose, labetalol, ondansetron, ondansetron, sodium chloride 0.9%, traMADol     Review of Systems   Constitutional: Negative for chills, fatigue and fever.   HENT: Negative for rhinorrhea, sinus pressure, sinus pain and sore throat.    Eyes: Negative for photophobia.   Respiratory: Positive for cough. Negative for shortness of breath.    Cardiovascular: Positive for palpitations and leg swelling. Negative for chest pain.   Gastrointestinal: Positive for abdominal distention, nausea and vomiting. Negative for abdominal pain and diarrhea.        No melena, BRBPR, hematemesis   Genitourinary: Negative for dysuria and hematuria.   Musculoskeletal: Negative for arthralgias, back pain and myalgias.   Skin: Negative for rash.   Neurological: Positive for speech difficulty and numbness (feet). Negative for dizziness, weakness, light-headedness and headaches.   Psychiatric/Behavioral: Negative for agitation and confusion.     Objective:     Vital Signs (Most Recent):  Temp: 98.2 °F (36.8 °C) (09/24/18 0752)  Pulse: 77 (09/24/18 0752)  Resp: 18 (09/24/18  0752)  BP: (!) 154/72 (09/24/18 0752)  SpO2: 99 % (09/24/18 0752) Vital Signs (24h Range):  Temp:  [97.4 °F (36.3 °C)-98.4 °F (36.9 °C)] 98.2 °F (36.8 °C)  Pulse:  [77-87] 77  Resp:  [14-18] 18  SpO2:  [90 %-99 %] 99 %  BP: (142-173)/(72-85) 154/72     Weight: 59.9 kg (132 lb 0 oz)  Body mass index is 21.97 kg/m².  Body surface area is 1.66 meters squared.      Intake/Output Summary (Last 24 hours) at 9/24/2018 0901  Last data filed at 9/24/2018 0450  Gross per 24 hour   Intake 360 ml   Output 2 ml   Net 358 ml       Physical Exam   Constitutional: She is oriented to person, place, and time. No distress.   HENT:   Head: Normocephalic and atraumatic.   Mouth/Throat: No oropharyngeal exudate.   Surgical scar on scalp s/p meningioma resection   Eyes: Conjunctivae and EOM are normal. Pupils are equal, round, and reactive to light. No scleral icterus.   Neck: Normal range of motion. Neck supple.   Cardiovascular: Normal rate, regular rhythm and intact distal pulses.   No murmur heard.  Pulmonary/Chest: No respiratory distress. She has no wheezes. She has no rales. She exhibits no tenderness.   Abdominal: Soft. She exhibits distension. There is no tenderness. There is no rebound and no guarding.   NG tube in place   Musculoskeletal: She exhibits no tenderness. Edema: trace b/l LE edema.   Lymphadenopathy:     She has no cervical adenopathy.   Neurological: She is alert and oriented to person, place, and time.   Skin: Skin is warm. No rash noted. She is not diaphoretic. No erythema.   Psychiatric: She has a normal mood and affect.       Significant Labs:   CBC:   Recent Labs   Lab  09/23/18   0420  09/23/18   0652  09/24/18   0445   WBC  7.37  8.62  7.03   HGB  12.2  11.3*  13.6   HCT  39.9  38.1  44.8   PLT  126*  200  196   , CMP:   Recent Labs   Lab  09/23/18   0420  09/24/18   0445   NA  145  141   K  4.4  4.5   CL  115*  111*   CO2  19*  19*   GLU  63*  92   BUN  18  14   CREATININE  1.2  1.1   CALCIUM  7.9*  8.3*    PROT  5.5*  6.3   ALBUMIN  2.7*  3.1*   BILITOT  1.9*  2.0*   ALKPHOS  109  127   AST  42*  43*   ALT  41  45*   ANIONGAP  11  11   EGFRNONAA  44.6*  49.6*    and All pertinent labs from the last 24 hours have been reviewed.    Diagnostic Results:  I have reviewed all pertinent imaging results/findings within the past 24 hours.    Assessment/Plan:     * SBO (small bowel obstruction)    KUB: There is mild gaseous distention of large and small bowel.  Moderate fecal loading in the left hemicolon.  CT abd/ pelvis Small hiatal hernia containing ingested contents placing the patient at risk for aspiration. significant distention of multiple loops of small bowel measuring up to 4.5 cm with fecalization of small bowel contents.  No definite transition point is identified, noting limitations from lack of IV or oral contrast.    Surgery consulted  NG tube placed - removed 9/23  Per surgery Gastrogaffin challenge w/ serial x-rays at 1, 4 & 8 hours post gastrograffin administration w/ NG tube clamped for 4 hours then placed to low intermittent suction  Per surgery enemas bid  Caution w/ phosphate containing enemas given CKD  Recommend tap water enema  Monitor electrolytes        History of deep vein thrombosis (DVT) of lower extremity    Has been on chronic anticoagulation since 2016 following DVT LLE  On eliquis 5mg bid (pt w/ documented weight 59.9kg, creatinine 1.4, age 73) however pt NPO  Lovenox 90  Once resolution of SBO can restart eliquis although dose adjustment may be needed based on weight and creatinine        Anxiety    Patient takes ativan 1mg PO PRN  Per prior neurology note attempt to minimize ativan  Will hold off ativan for now, if needed may give ativan .5mg PRN for anxiety        Insomnia    Per last neurology note decrease elavil to 25mg qhs from 50mg          Dyslipidemia    Holding home atorvastatin for now        Stage 3 chronic kidney disease    Monitor renal function         Malignant neoplasm of  lower lobe of right lung    Adenocarcinoma of R lung   On Xgeva and Tarceva  Last chemo session 9/18        Meningioma    S/p resection in 2016  Has been on keppra as seizure prophylaxis since  As pt NPO, switched from PO to IV keppra 500mg bid        Essential hypertension    Holding home BP meds  labetelol 10mg PRN for SBP >180                 Frantz Neumann MD  Hematology/Oncology  [facility]

## 2018-09-24 NOTE — ASSESSMENT & PLAN NOTE
KUB: There is mild gaseous distention of large and small bowel.  Moderate fecal loading in the left hemicolon.  CT abd/ pelvis Small hiatal hernia containing ingested contents placing the patient at risk for aspiration. significant distention of multiple loops of small bowel measuring up to 4.5 cm with fecalization of small bowel contents.  No definite transition point is identified, noting limitations from lack of IV or oral contrast.    Surgery consulted  NG tube placed - removed 9/23  Start senna-docusate 8.6-50 mg daily

## 2018-09-24 NOTE — PROGRESS NOTES
[facility]  Hematology/Oncology  Progress Note    Patient Name: Naty St  Admission Date: 9/21/2018  Hospital Length of Stay: 3 days  Code Status: Full Code     Subjective:     HPI:  72 y/o F PMhx R lung adenocarcinoma on Xgeva and Tarceva, breast ca s/p L lumpectomy, meningioma s/p resection, pancreatic ca s/p whipple, and s/p hysterectomy w/ b/l salpingo-oophorectomy, HFpEF, HTN who presents w/ abd pain, nausea, emesis. Patient initially presented to ED yesterday evening after having lower mid-line abd pain & a bout of bilious emesis. In ED she was given zofran, dilaudid and 1 L NS. CT abd/ pelvis demonstrated significant distention of multiple loops of small bowel measuring up to 4.5 cm with fecalization of small bowel contents & small hiatal hernia. She was subsequently discharged w/ IM clinic f/u today who referred her to the ED. She states that prior to initial ED visit she had 3 medium sized BM, however, has had none since that time and denies passing flatus. Additionally, she states abd pain slightly improved after bout of emesis. She endorses n/v, abd pain. She denies BRBPR, melena, hematemesis, hematochezia, chest pain, SOB.    Oncologic History per Dr. Vazquez's last clinic note:  had a meningioma resection done in the past; however, recently, underwent neurosurgical resection with Dr. 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    Additional immunohistochemical stains were performed which revealed the tumor cells to be are positive for TTF1 and negative for ER and GCDFP. The morphology and TTF1 positivity are most consistent with lung primary.    Of note, imaging scan at the end of January 2016 revealed a mass in the lung at 2 cm in the medial aspect of the apical segment of the right upper lobe abutting the mediastinum at the level of the azygous vein and  "abutting and possibly encasing the segmental bronchi and vessels of the apical segment of the right upper lobe and no pleural fluid was present. Also, there is an enlarged right paratracheal lymph node. No evidence of any metastatic disease at the pancreatic site with postoperative changes post Whipple disease  Her PET Scan from 2/15/16 reveal "Hypermetabolic mass in the right lung apex consistent with a primary malignancy. Hypermetabolic mediastinal lymph nodes consistent with metastatic disease. Right sacral hypermetabolic lesion consistent with metastatic disease, noting additional mildly sclerotic lesions in multiple vertebral bodies which do not demonstrate abnormal hypermetabolism  She underwent IR bone biopsy which revealed metastatic adenocarcinoma of lung origin. 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  She has completed focal RT to brain lesions in March 2016.    PET scan from 6/6/16 shows "Dramatic almost complete response to therapy."  Lovenox started after US lower ext 6/7/16 shows Acute complete occlusion of one of the left posterior tibial vein.Remote partial thrombus of the proximal left superficial femoral vein.  She is on Tarceva.   12/6/16 PET scan reveals "Stable right upper lobe lesion and right hilar lymph node. No new lesions identified. Trace left pleural effusion".  1/27/17 Renal u/s "Medical renal disease. Nonobstructive right nephrolithiasis"  1/31/17 PET - "Right upper lobe nodule and right hilar lymph node similar and very low grade activity.  There is no definite evidence of recurrence."   11/9/17 PET "In this patient with history of lung cancer, there is interval increase in size and hypermetabolism of a right upper lobe lung lesion concerning for recurrent disease. Additionally, there is interval appearance of a hypermetabolic paratracheal lymph node suspicious for metastatic disease"     She progressed on Tarceva She underwent EBUS on 12/5/17. Pathology revealed adenocarcinoma but " "T790m could not be done as quantity was not insufficient. Her PET scan from 1/30/18 revealed The patient's previously identified abnormal lesions is slightly greater uptake of FDG on today's study compared with prior exam. These findings are concerning for progression of disease"      She was hospitalized between 4/9/18-4/16/18. Her hospital course was as follows "Patient was admitted to hemonc service on 4/9 with acute hypoxic respiratory failure. She was requiring 4 L of nc on admission. She was started on moxi for CAP. She received one dose of ceftriaxone in the ED. On 2nd day of admission she spiked a fever. Her Hb was ~7 g/dl so she was transfused 1 unit of PRBCs. Over night she developed worsening of her symptoms with increased O2 requirements to 15 L HFNC. Her CXR showed worsening congestion. There was a concern of TRALI vs TACO. New 2D echo with diastolic dysfunction. She was given IV lasix pushes as needed and was started on dexamethasone.  Her abx was escalated to vanc and cefepime. She improved with diuresis and steroids. Pulmonary were consulted. Her O2 requirements continued to improve. On 4/15 she was switched to Augmentin. PT recommended discharging her to SNF but the patient refused and she wanted to go home with home health. She qualified for home oxygen so she was discharged on 3 L nc while at rest and 6 while active. Augmentin and dexamethasone were discontinued on discharge"     She was readmitted from 4/27 to 5/3/18 with who presented to the ED with a complaint of fatigue and worsening DELA CRUZ. Pt diagnosed PNA 4/9 and was discharged on 4/16 on 3L oxygen. She was initially placed on cefepime for 3 days followed by an add'l 2 days of Augmentin. Pulm was consulted with assistance as her oxygen requirements were increasing. She was placed on oral steroids, then trailed on 80mg TID solumedrol for 2 days and will be discharged on a prednisone taper. She was also diuresed with 2 days of 40mg IV lasix with " "appropriate UOP resulting, improvement on repeat CXR. She was medically stable and appropriate for DC home with home health on 1L continuous O2. She will be seen for close f/u and may be able to come off oxygen at that time.      She discontinued Tagrisso in April 2018. Restaging scans from 6/4/18 revealed "Stable appearance of a spiculated right upper lobe pulmonary lesion.  Additional irregular focus superior to the lesion in the right lung apex is stable and may represent scar or additional lesion; although this was not hypermetabolic on prior PET-CT.  No new pulmonary lesions. 1.3 cm subcarinal lymph node, not hypermetabolic on prior PET-CT. Stable postsurgical changes of a Whipple procedure. Bilateral nonobstructing nephrolithiasis.  Stable sclerotic focus in the T5 vertebral body, possibly a bone island as this was not hypermetabolic on prior PET-CT. Small hiatal hernia. RECIST SUMMARY: Date of prior examination for comparison 01/30/2018 Lesion 1: Right upper lobe 1.3 cm Series 2 image 31 prior measurement 1.3 cm"  Bone scan also from 6/4/18 revealed no evidence of mets.     Her PET scan from 7/11/18 revealed "Right suprahilar lesion SUV max 3.76, previously 6.86. Pretracheal lymph node SUV max 2.55, previously 3.79. There is physiologic intracranial, head, and neck activity.  There is muscle activation.  There is vocal cord activation.  There is physiologic liver, spleen, GI and  activity.  There is a hiatal hernia.  Pelvic organs show nothing unusual.  No bone lesions are seen.  There are areas of benign appearing lung scar"  She notes fatigue.  She denies any nausea, vomiting, diarrhea, constipation, abdominal pain, weight loss or loss of appetite, chest pain, shortness of breath, leg swelling, fatigue, pain, headache, dizziness, or mood changes. Her ECOG PS is 2. She is accompanied by her  and son     She has been on Tarceva since 6/5/18    No new subjective & objective note has been filed under " this hospital service since the last note was generated.    Assessment/Plan:     * SBO (small bowel obstruction)    KUB: There is mild gaseous distention of large and small bowel.  Moderate fecal loading in the left hemicolon.  CT abd/ pelvis Small hiatal hernia containing ingested contents placing the patient at risk for aspiration. significant distention of multiple loops of small bowel measuring up to 4.5 cm with fecalization of small bowel contents.  No definite transition point is identified, noting limitations from lack of IV or oral contrast.    Surgery consulted  NG tube placed - removed 9/23  Per surgery Gastrogaffin challenge w/ serial x-rays at 1, 4 & 8 hours post gastrograffin administration w/ NG tube clamped for 4 hours then placed to low intermittent suction  Per surgery enemas bid  Caution w/ phosphate containing enemas given CKD  Recommend tap water enema  Monitor electrolytes        History of deep vein thrombosis (DVT) of lower extremity    Has been on chronic anticoagulation since 2016 following DVT LLE  On eliquis 5mg bid (pt w/ documented weight 59.9kg, creatinine 1.4, age 73) however pt NPO  Lovenox 90  Once resolution of SBO can restart eliquis although dose adjustment may be needed based on weight and creatinine        Anxiety    Patient takes ativan 1mg PO PRN  Per prior neurology note attempt to minimize ativan  Will hold off ativan for now, if needed may give ativan .5mg PRN for anxiety        Insomnia    Per last neurology note decrease elavil to 25mg qhs from 50mg          Dyslipidemia    Holding home atorvastatin for now        Stage 3 chronic kidney disease    Monitor renal function         Malignant neoplasm of lower lobe of right lung    Adenocarcinoma of R lung   On Xgeva and Tarceva  Last chemo session 9/18        Meningioma    S/p resection in 2016  Has been on keppra as seizure prophylaxis since  As pt NPO, switched from PO to IV keppra 500mg bid        Essential hypertension     Holding home BP meds  labetelol 10mg PRN for SBP >180                 Frantz Neumann MD  Hematology/Oncology  [facility]

## 2018-09-24 NOTE — SUBJECTIVE & OBJECTIVE
Interval History: Pt had one bowel movement overnight and one episode of emesis. Pt states that she feels better. NG tube removed by surgery. Pt deneis any abdominal pain or nausea since admission.     Oncology Treatment Plan:   [No treatment plan]    Medications:  Continuous Infusions:   sodium chloride 0.9% 100 mL/hr (09/22/18 2857)     Scheduled Meds:   amitriptyline  50 mg Oral QHS    amLODIPine  5 mg Oral Daily    apixaban  5 mg Oral BID    atorvastatin  40 mg Oral Daily    levETIRAcetam  500 mg Oral BID    lipase-protease-amylase 12,000-38,000-60,000 units  1 capsule Oral TID WM    losartan  50 mg Oral BID    metoprolol succinate  50 mg Oral Daily    morphine  1 mg Intravenous Once    ondansetron  8 mg Oral Once    sodium bicarbonate  650 mg Oral TID     PRN Meds:dextrose 50%, dextrose 50%, duke's soln (benadryl 30 mL, mylanta 30 mL, lidocane 30 mL, nystatin 30 mL) 120 mL, glucagon (human recombinant), glucose, glucose, labetalol, ondansetron, ondansetron, sodium chloride 0.9%, traMADol     Review of Systems   Constitutional: Negative for chills, fatigue and fever.   HENT: Negative for rhinorrhea, sinus pressure, sinus pain and sore throat.    Eyes: Negative for photophobia.   Respiratory: Positive for cough. Negative for shortness of breath.    Cardiovascular: Positive for palpitations and leg swelling. Negative for chest pain.   Gastrointestinal: Positive for abdominal distention, nausea and vomiting. Negative for abdominal pain and diarrhea.        No melena, BRBPR, hematemesis   Genitourinary: Negative for dysuria and hematuria.   Musculoskeletal: Negative for arthralgias, back pain and myalgias.   Skin: Negative for rash.   Neurological: Positive for speech difficulty and numbness (feet). Negative for dizziness, weakness, light-headedness and headaches.   Psychiatric/Behavioral: Negative for agitation and confusion.     Objective:     Vital Signs (Most Recent):  Temp: 98.2 °F (36.8 °C)  (09/24/18 0752)  Pulse: 77 (09/24/18 0752)  Resp: 18 (09/24/18 0752)  BP: (!) 154/72 (09/24/18 0752)  SpO2: 99 % (09/24/18 0752) Vital Signs (24h Range):  Temp:  [97.4 °F (36.3 °C)-98.4 °F (36.9 °C)] 98.2 °F (36.8 °C)  Pulse:  [77-87] 77  Resp:  [14-18] 18  SpO2:  [90 %-99 %] 99 %  BP: (142-173)/(72-85) 154/72     Weight: 59.9 kg (132 lb 0 oz)  Body mass index is 21.97 kg/m².  Body surface area is 1.66 meters squared.      Intake/Output Summary (Last 24 hours) at 9/24/2018 0901  Last data filed at 9/24/2018 0450  Gross per 24 hour   Intake 360 ml   Output 2 ml   Net 358 ml       Physical Exam   Constitutional: She is oriented to person, place, and time. No distress.   HENT:   Head: Normocephalic and atraumatic.   Mouth/Throat: No oropharyngeal exudate.   Surgical scar on scalp s/p meningioma resection   Eyes: Conjunctivae and EOM are normal. Pupils are equal, round, and reactive to light. No scleral icterus.   Neck: Normal range of motion. Neck supple.   Cardiovascular: Normal rate, regular rhythm and intact distal pulses.   No murmur heard.  Pulmonary/Chest: No respiratory distress. She has no wheezes. She has no rales. She exhibits no tenderness.   Abdominal: Soft. She exhibits distension. There is no tenderness. There is no rebound and no guarding.   NG tube in place   Musculoskeletal: She exhibits no tenderness. Edema: trace b/l LE edema.   Lymphadenopathy:     She has no cervical adenopathy.   Neurological: She is alert and oriented to person, place, and time.   Skin: Skin is warm. No rash noted. She is not diaphoretic. No erythema.   Psychiatric: She has a normal mood and affect.       Significant Labs:   CBC:   Recent Labs   Lab  09/23/18   0420  09/23/18   0652  09/24/18   0445   WBC  7.37  8.62  7.03   HGB  12.2  11.3*  13.6   HCT  39.9  38.1  44.8   PLT  126*  200  196   , CMP:   Recent Labs   Lab  09/23/18   0420  09/24/18   0445   NA  145  141   K  4.4  4.5   CL  115*  111*   CO2  19*  19*   GLU  63*  92    BUN  18  14   CREATININE  1.2  1.1   CALCIUM  7.9*  8.3*   PROT  5.5*  6.3   ALBUMIN  2.7*  3.1*   BILITOT  1.9*  2.0*   ALKPHOS  109  127   AST  42*  43*   ALT  41  45*   ANIONGAP  11  11   EGFRNONAA  44.6*  49.6*    and All pertinent labs from the last 24 hours have been reviewed.    Diagnostic Results:  I have reviewed all pertinent imaging results/findings within the past 24 hours.

## 2018-09-24 NOTE — PLAN OF CARE
Problem: Patient Care Overview  Goal: Plan of Care Review  Outcome: Ongoing (interventions implemented as appropriate)  Plan of care reviewed with patient and her  at the beginning of the shift. Patient complained of cramping abdominal pain and nausea, Dr. Arriaza notified, PRN Oxycodne and Zofran given. One episode of emesis in the morning, Dr. Arriaza was notified.Patient continues to be on a full liquid diet and will advance as tolerated per MD order. Oral fluids encouraged. Afebrile overnight. Patient is stable. Instructed patient to call for assistance. Bed low and locked, call bell within reach, nonskid socks on, pt verbalized understanding. Infection, pressure ulcer, and fall risks precautions maintained. Will continue to monitor.

## 2018-09-24 NOTE — NURSING
Patient prepared for discharge. AVS reveiwed. All questions answered. VSS Stable . Patient understands follow up instructions. Son at bedside reviewed information with son as well . Transport arranged for escort

## 2018-09-24 NOTE — ASSESSMENT & PLAN NOTE
Has been on chronic anticoagulation since 2016 following DVT LLE  Resume eliquis 5mg bid (pt w/ documented weight 59.9kg, creatinine 1.4, age 73)

## 2018-09-24 NOTE — DISCHARGE SUMMARY
[facility]  Hematology/Oncology  Discharge Summary      Patient Name: Naty St  MRN: [id number]  Admission Date: 9/21/2018  Hospital Length of Stay: 3 days  Discharge Date and Time:  09/24/2018 5:11 PM  Attending Physician: Charli Bonilla MD   Discharging Provider: Frantz Neumann MD  Primary Care Provider: Olvin Mars MD    HPI: 72 y/o F PMhx R lung adenocarcinoma on Xgeva and Tarceva, breast ca s/p L lumpectomy, meningioma s/p resection, pancreatic ca s/p whipple, and s/p hysterectomy w/ b/l salpingo-oophorectomy, HFpEF, HTN who presents w/ abd pain, nausea, emesis. Patient initially presented to ED yesterday evening after having lower mid-line abd pain & a bout of bilious emesis. In ED she was given zofran, dilaudid and 1 L NS. CT abd/ pelvis demonstrated significant distention of multiple loops of small bowel measuring up to 4.5 cm with fecalization of small bowel contents & small hiatal hernia. She was subsequently discharged w/ IM clinic f/u today who referred her to the ED. She states that prior to initial ED visit she had 3 medium sized BM, however, has had none since that time and denies passing flatus. Additionally, she states abd pain slightly improved after bout of emesis. She endorses n/v, abd pain. She denies BRBPR, melena, hematemesis, hematochezia, chest pain, SOB.    Oncologic History per Dr. Vazquez's last clinic note:  had a meningioma resection done in the past; however, recently, underwent neurosurgical resection with Dr. 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    Additional immunohistochemical stains were performed which revealed the tumor cells to be are positive for TTF1 and negative for ER and GCDFP. The morphology and TTF1 positivity are most consistent with lung primary.    Of note, imaging scan at the end of January 2016 revealed a  "mass in the lung at 2 cm in the medial aspect of the apical segment of the right upper lobe abutting the mediastinum at the level of the azygous vein and abutting and possibly encasing the segmental bronchi and vessels of the apical segment of the right upper lobe and no pleural fluid was present. Also, there is an enlarged right paratracheal lymph node. No evidence of any metastatic disease at the pancreatic site with postoperative changes post Whipple disease  Her PET Scan from 2/15/16 reveal "Hypermetabolic mass in the right lung apex consistent with a primary malignancy. Hypermetabolic mediastinal lymph nodes consistent with metastatic disease. Right sacral hypermetabolic lesion consistent with metastatic disease, noting additional mildly sclerotic lesions in multiple vertebral bodies which do not demonstrate abnormal hypermetabolism  She underwent IR bone biopsy which revealed metastatic adenocarcinoma of lung origin. 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  She has completed focal RT to brain lesions in March 2016.    PET scan from 6/6/16 shows "Dramatic almost complete response to therapy."  Lovenox started after US lower ext 6/7/16 shows Acute complete occlusion of one of the left posterior tibial vein.Remote partial thrombus of the proximal left superficial femoral vein.  She is on Tarceva.   12/6/16 PET scan reveals "Stable right upper lobe lesion and right hilar lymph node. No new lesions identified. Trace left pleural effusion".  1/27/17 Renal u/s "Medical renal disease. Nonobstructive right nephrolithiasis"  1/31/17 PET - "Right upper lobe nodule and right hilar lymph node similar and very low grade activity.  There is no definite evidence of recurrence."   11/9/17 PET "In this patient with history of lung cancer, there is interval increase in size and hypermetabolism of a right upper lobe lung lesion concerning for recurrent disease. Additionally, there is interval appearance of a hypermetabolic " "paratracheal lymph node suspicious for metastatic disease"     She progressed on Tarceva She underwent EBUS on 12/5/17. Pathology revealed adenocarcinoma but T790m could not be done as quantity was not insufficient. Her PET scan from 1/30/18 revealed The patient's previously identified abnormal lesions is slightly greater uptake of FDG on today's study compared with prior exam. These findings are concerning for progression of disease"      She was hospitalized between 4/9/18-4/16/18. Her hospital course was as follows "Patient was admitted to hemonc service on 4/9 with acute hypoxic respiratory failure. She was requiring 4 L of nc on admission. She was started on moxi for CAP. She received one dose of ceftriaxone in the ED. On 2nd day of admission she spiked a fever. Her Hb was ~7 g/dl so she was transfused 1 unit of PRBCs. Over night she developed worsening of her symptoms with increased O2 requirements to 15 L HFNC. Her CXR showed worsening congestion. There was a concern of TRALI vs TACO. New 2D echo with diastolic dysfunction. She was given IV lasix pushes as needed and was started on dexamethasone.  Her abx was escalated to vanc and cefepime. She improved with diuresis and steroids. Pulmonary were consulted. Her O2 requirements continued to improve. On 4/15 she was switched to Augmentin. PT recommended discharging her to SNF but the patient refused and she wanted to go home with home health. She qualified for home oxygen so she was discharged on 3 L nc while at rest and 6 while active. Augmentin and dexamethasone were discontinued on discharge"     She was readmitted from 4/27 to 5/3/18 with who presented to the ED with a complaint of fatigue and worsening DELA CRUZ. Pt diagnosed PNA 4/9 and was discharged on 4/16 on 3L oxygen. She was initially placed on cefepime for 3 days followed by an add'l 2 days of Augmentin. Pulm was consulted with assistance as her oxygen requirements were increasing. She was placed on oral " "steroids, then trailed on 80mg TID solumedrol for 2 days and will be discharged on a prednisone taper. She was also diuresed with 2 days of 40mg IV lasix with appropriate UOP resulting, improvement on repeat CXR. She was medically stable and appropriate for DC home with home health on 1L continuous O2. She will be seen for close f/u and may be able to come off oxygen at that time.      She discontinued Tagrisso in April 2018. Restaging scans from 6/4/18 revealed "Stable appearance of a spiculated right upper lobe pulmonary lesion.  Additional irregular focus superior to the lesion in the right lung apex is stable and may represent scar or additional lesion; although this was not hypermetabolic on prior PET-CT.  No new pulmonary lesions. 1.3 cm subcarinal lymph node, not hypermetabolic on prior PET-CT. Stable postsurgical changes of a Whipple procedure. Bilateral nonobstructing nephrolithiasis.  Stable sclerotic focus in the T5 vertebral body, possibly a bone island as this was not hypermetabolic on prior PET-CT. Small hiatal hernia. RECIST SUMMARY: Date of prior examination for comparison 01/30/2018 Lesion 1: Right upper lobe 1.3 cm Series 2 image 31 prior measurement 1.3 cm"  Bone scan also from 6/4/18 revealed no evidence of mets.     Her PET scan from 7/11/18 revealed "Right suprahilar lesion SUV max 3.76, previously 6.86. Pretracheal lymph node SUV max 2.55, previously 3.79. There is physiologic intracranial, head, and neck activity.  There is muscle activation.  There is vocal cord activation.  There is physiologic liver, spleen, GI and  activity.  There is a hiatal hernia.  Pelvic organs show nothing unusual.  No bone lesions are seen.  There are areas of benign appearing lung scar"  She notes fatigue.  She denies any nausea, vomiting, diarrhea, constipation, abdominal pain, weight loss or loss of appetite, chest pain, shortness of breath, leg swelling, fatigue, pain, headache, dizziness, or mood changes. " Her ECOG PS is 2. She is accompanied by her  and son     She has been on Tarceva since 6/5/18    * No surgery found *     Hospital Course: No notes on file    Consults:   Consults (From admission, onward)        Status Ordering Provider     Inpatient consult to General surgery  Once     Provider:  (Not yet assigned)    Completed DONALD ONEIL     Inpatient consult to Oncology  Once     Provider:  (Not yet assigned)    Acknowledged DONALD ONEIL          Significant Diagnostic Studies: Labs: All labs within the past 24 hours have been reviewed    Pending Diagnostic Studies:     None        Final Active Diagnoses:    Diagnosis Date Noted POA    PRINCIPAL PROBLEM:  SBO (small bowel obstruction) [K56.609] 09/21/2018 Yes    Abdominal pain [R10.9] 09/21/2018 Yes    Insomnia [G47.00] 09/21/2018 Yes    Anxiety [F41.9] 09/21/2018 Yes    History of deep vein thrombosis (DVT) of lower extremity [Z86.718] 09/21/2018 Not Applicable    Dyslipidemia [E78.5] 05/24/2018 Yes    Stage 3 chronic kidney disease [N18.3] 04/12/2018 Yes    Malignant neoplasm of lower lobe of right lung [C34.31] 03/17/2016 Yes    Meningioma [D32.9] 01/12/2016 Yes     Chronic    Essential hypertension [I10] 08/25/2014 Yes      Problems Resolved During this Admission:      Discharged Condition: good    Disposition: Home or Self Care    Follow Up:  Follow-up Information     Karrie Vazquez MD.    Specialties:  Hematology and Oncology, Hematology  Why:  as scheduled by clinic  Contact information:  [address]  [place] [address]  [phone]                 Patient Instructions:      Notify your health care provider if you experience any of the following:  temperature >100.4     Notify your health care provider if you experience any of the following:  persistent nausea and vomiting or diarrhea     Notify your health care provider if you experience any of the following:  severe uncontrolled pain     Notify your health care  provider if you experience any of the following:  redness, tenderness, or signs of infection (pain, swelling, redness, odor or green/yellow discharge around incision site)     Notify your health care provider if you experience any of the following:  increased confusion or weakness     Activity as tolerated     Medications:  Reconciled Home Medications:      Medication List      START taking these medications    senna-docusate 8.6-50 mg 8.6-50 mg per tablet  Commonly known as:  SENNA LAXATIVE-STOOL SOFTENER  Take 1 tablet by mouth once daily.        CHANGE how you take these medications    erlotinib 150 MG tablet  Commonly known as:  TARCEVA  Take 1 tablet (150 mg total) by mouth once daily. Hold until Dr. Vazquez says to resume.  What changed:  additional instructions        CONTINUE taking these medications    amitriptyline 50 MG tablet  Commonly known as:  ELAVIL  Take 1 tablet (50 mg total) by mouth every evening.     amLODIPine 5 MG tablet  Commonly known as:  NORVASC  Take 1 tablet (5 mg total) by mouth once daily.     apixaban 5 mg Tab  Commonly known as:  ELIQUIS  TAKE 1 TABLET BY MOUTH TWO TIMES A DAY     atorvastatin 40 MG tablet  Commonly known as:  LIPITOR  Take 1 tablet (40 mg total) by mouth once daily.     CREON Cpdr  Generic drug:  lipase-protease-amylase 12,000-38,000-60,000 units  TAKE ONE CAPSULE BY MOUTH THREE TIMES A DAY WITH MEALS     denosumab 120 mg/1.7 mL (70 mg/mL) Soln  Commonly known as:  XGEVA  Inject 120 mg into the skin every 28 days.     dronabinol 5 MG capsule  Commonly known as:  MARINOL  Take 1 capsule (5 mg total) by mouth 2 (two) times daily before meals.     DUKE'S SOLUTION  Take 10 mLs by mouth 4 (four) times daily as needed (mouth sores/pain).     levETIRAcetam 500 MG Tab  Commonly known as:  KEPPRA  Take 1 tablet (500 mg total) by mouth 2 (two) times daily.     LORazepam 1 MG tablet  Commonly known as:  ATIVAN  TAKE ONE TABLET BY MOUTH TWICE DAILY     losartan 50 MG  tablet  Commonly known as:  COZAAR  Take 1 tablet (50 mg total) by mouth 2 (two) times daily.     metoprolol succinate 50 MG 24 hr tablet  Commonly known as:  TOPROL-XL  Take 1 tablet (50 mg total) by mouth once daily.     mirabegron 50 mg Tb24  Commonly known as:  MYRBETRIQ  Take 1 tablet (50 mg total) by mouth once daily.     multivitamin per tablet  Commonly known as:  THERAGRAN  Take 1 tablet by mouth once daily.     ondansetron 4 MG tablet  Commonly known as:  ZOFRAN  Take 1 tablet (4 mg total) by mouth every 6 (six) hours as needed for Nausea.     traMADol 50 mg tablet  Commonly known as:  ULTRAM  Take 1 tablet (50 mg total) by mouth every 6 (six) hours as needed for Pain.        STOP taking these medications    clindamycin phosphate 1% 1 % gel  Commonly known as:  CLINDAGEL     levoFLOXacin 500 MG tablet  Commonly known as:  LEVAQUIN     meclizine 12.5 mg tablet  Commonly known as:  ANTIVERT     predniSONE 10 MG tablet  Commonly known as:  DELTASONE     sodium bicarbonate 650 MG tablet            Frantz Neumann MD  Hematology/Oncology  [facility]

## 2018-09-24 NOTE — ASSESSMENT & PLAN NOTE
S/p resection in 2016  Has been on keppra as seizure prophylaxis since  Resume PO Keppra 500mg bid

## 2018-09-25 ENCOUNTER — OFFICE VISIT (OUTPATIENT)
Dept: HEMATOLOGY/ONCOLOGY | Facility: CLINIC | Age: 73
End: 2018-09-25
Payer: MEDICARE

## 2018-09-25 ENCOUNTER — LAB VISIT (OUTPATIENT)
Dept: LAB | Facility: HOSPITAL | Age: 73
End: 2018-09-25
Attending: INTERNAL MEDICINE
Payer: MEDICARE

## 2018-09-25 ENCOUNTER — TELEPHONE (OUTPATIENT)
Dept: HEMATOLOGY/ONCOLOGY | Facility: CLINIC | Age: 73
End: 2018-09-25

## 2018-09-25 ENCOUNTER — DOCUMENTATION ONLY (OUTPATIENT)
Dept: HEMATOLOGY/ONCOLOGY | Facility: CLINIC | Age: 73
End: 2018-09-25

## 2018-09-25 VITALS
SYSTOLIC BLOOD PRESSURE: 113 MMHG | DIASTOLIC BLOOD PRESSURE: 57 MMHG | OXYGEN SATURATION: 99 % | HEART RATE: 78 BPM | WEIGHT: 140 LBS | TEMPERATURE: 98 F | RESPIRATION RATE: 20 BRPM | BODY MASS INDEX: 22.5 KG/M2 | HEIGHT: 66 IN

## 2018-09-25 DIAGNOSIS — D63.0 ANEMIA IN NEOPLASTIC DISEASE: ICD-10-CM

## 2018-09-25 DIAGNOSIS — N18.30 STAGE 3 CHRONIC KIDNEY DISEASE: ICD-10-CM

## 2018-09-25 DIAGNOSIS — C34.90 MALIGNANT NEOPLASM OF LUNG, UNSPECIFIED LATERALITY, UNSPECIFIED PART OF LUNG: ICD-10-CM

## 2018-09-25 DIAGNOSIS — C79.51 SECONDARY CANCER OF BONE: ICD-10-CM

## 2018-09-25 DIAGNOSIS — C34.31 MALIGNANT NEOPLASM OF LOWER LOBE OF RIGHT LUNG: Primary | ICD-10-CM

## 2018-09-25 DIAGNOSIS — C79.31 BRAIN METASTASES: ICD-10-CM

## 2018-09-25 DIAGNOSIS — R17 ELEVATED BILIRUBIN: ICD-10-CM

## 2018-09-25 LAB
ALBUMIN SERPL BCP-MCNC: 2.6 G/DL
ALP SERPL-CCNC: 94 U/L
ALT SERPL W/O P-5'-P-CCNC: 34 U/L
ANION GAP SERPL CALC-SCNC: 7 MMOL/L
AST SERPL-CCNC: 31 U/L
BILIRUB SERPL-MCNC: 1.4 MG/DL
BUN SERPL-MCNC: 23 MG/DL
CALCIUM SERPL-MCNC: 7.8 MG/DL
CHLORIDE SERPL-SCNC: 113 MMOL/L
CO2 SERPL-SCNC: 23 MMOL/L
CREAT SERPL-MCNC: 1.9 MG/DL
ERYTHROCYTE [DISTWIDTH] IN BLOOD BY AUTOMATED COUNT: 13.2 %
EST. GFR  (AFRICAN AMERICAN): 29.5 ML/MIN/1.73 M^2
EST. GFR  (NON AFRICAN AMERICAN): 25.6 ML/MIN/1.73 M^2
GLUCOSE SERPL-MCNC: 124 MG/DL
HCT VFR BLD AUTO: 35.7 %
HGB BLD-MCNC: 10.9 G/DL
IMM GRANULOCYTES # BLD AUTO: 0.02 K/UL
MCH RBC QN AUTO: 29.6 PG
MCHC RBC AUTO-ENTMCNC: 30.5 G/DL
MCV RBC AUTO: 97 FL
NEUTROPHILS # BLD AUTO: 3.1 K/UL
PLATELET # BLD AUTO: 211 K/UL
PMV BLD AUTO: 10.1 FL
POTASSIUM SERPL-SCNC: 3.7 MMOL/L
PROT SERPL-MCNC: 5.2 G/DL
RBC # BLD AUTO: 3.68 M/UL
SODIUM SERPL-SCNC: 143 MMOL/L
WBC # BLD AUTO: 5.3 K/UL

## 2018-09-25 PROCEDURE — 36415 COLL VENOUS BLD VENIPUNCTURE: CPT

## 2018-09-25 PROCEDURE — 1101F PT FALLS ASSESS-DOCD LE1/YR: CPT | Mod: CPTII,,, | Performed by: INTERNAL MEDICINE

## 2018-09-25 PROCEDURE — 3074F SYST BP LT 130 MM HG: CPT | Mod: CPTII,,, | Performed by: INTERNAL MEDICINE

## 2018-09-25 PROCEDURE — 99215 OFFICE O/P EST HI 40 MIN: CPT | Mod: S$PBB,,, | Performed by: INTERNAL MEDICINE

## 2018-09-25 PROCEDURE — 99999 PR PBB SHADOW E&M-EST. PATIENT-LVL IV: CPT | Mod: PBBFAC,,, | Performed by: INTERNAL MEDICINE

## 2018-09-25 PROCEDURE — 99214 OFFICE O/P EST MOD 30 MIN: CPT | Mod: PBBFAC | Performed by: INTERNAL MEDICINE

## 2018-09-25 PROCEDURE — 3078F DIAST BP <80 MM HG: CPT | Mod: CPTII,,, | Performed by: INTERNAL MEDICINE

## 2018-09-25 PROCEDURE — 80053 COMPREHEN METABOLIC PANEL: CPT

## 2018-09-25 PROCEDURE — 99499 UNLISTED E&M SERVICE: CPT | Mod: S$GLB,,, | Performed by: INTERNAL MEDICINE

## 2018-09-25 PROCEDURE — 85027 COMPLETE CBC AUTOMATED: CPT

## 2018-09-25 NOTE — PROGRESS NOTES
"Subjective:       Patient ID: Naty St is a 73 y.o. female.    Chief Complaint: Malignant neoplasm of lower lobe of right lung  Oncologic History:  Ms. Naty St was admitted to the hospital between 01/25/2016 and 01/28/2016. She has a history of pancreatic cancer 17 years ago, breast cancer and meningioma, presented to the hospital complaining of loss of consciousness. The patient apparently was in her normal state of health and she stood up to go to the bathroom and fell to the floor. The patient's daughter helped the mother up in the bathroom and noted that her mother's upper extremities were shaking and eye rolling. No reports of bowel or bladder incontinence, tongue biting or rolling. The second episode lasted about four minutes and she was extremely lethargic following that. Apparently, the patient had a meningioma resection done in the past; however, recently, underwent neurosurgical resection with Dr. 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    Additional immunohistochemical stains were performed which revealed the tumor cells to be are positive for TTF1 and negative for ER and GCDFP. The morphology and TTF1 positivity are most consistent with lung primary.    Of note, imaging scan at the end of January 2016 revealed a mass in the lung at 2 cm in the medial aspect of the apical segment of the right upper lobe abutting the mediastinum at the level of the azygous vein and abutting and possibly encasing the segmental bronchi and vessels of the apical segment of the right upper lobe and no pleural fluid was present. Also, there is an enlarged right paratracheal lymph node. No evidence of any metastatic disease at the pancreatic site with postoperative changes post Whipple disease  Her PET Scan from 2/15/16 reveal "Hypermetabolic mass in the right lung apex consistent with a " "primary malignancy. Hypermetabolic mediastinal lymph nodes consistent with metastatic disease. Right sacral hypermetabolic lesion consistent with metastatic disease, noting additional mildly sclerotic lesions in multiple vertebral bodies which do not demonstrate abnormal hypermetabolism  She underwent IR bone biopsy which revealed metastatic adenocarcinoma of lung origin. 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  She has completed focal RT to brain lesions in March 2016.    PET scan from 6/6/16 shows "Dramatic almost complete response to therapy."  Lovenox started after US lower ext 6/7/16 shows Acute complete occlusion of one of the left posterior tibial vein.Remote partial thrombus of the proximal left superficial femoral vein.  She is on Tarceva.   12/6/16 PET scan reveals "Stable right upper lobe lesion and right hilar lymph node. No new lesions identified. Trace left pleural effusion".  1/27/17 Renal u/s "Medical renal disease. Nonobstructive right nephrolithiasis"  1/31/17 PET - "Right upper lobe nodule and right hilar lymph node similar and very low grade activity.  There is no definite evidence of recurrence."   11/9/17 PET "In this patient with history of lung cancer, there is interval increase in size and hypermetabolism of a right upper lobe lung lesion concerning for recurrent disease. Additionally, there is interval appearance of a hypermetabolic paratracheal lymph node suspicious for metastatic disease"                 She progressed on Tarceva She underwent EBUS on 12/5/17. Pathology revealed adenocarcinoma but T790m could not be done as quantity was not insufficient. Her PET scan from 1/30/18 revealed The patient's previously identified abnormal lesions is slightly greater uptake of FDG on today's study compared with prior exam. These findings are concerning for progression of disease"      She was hospitalized between 4/9/18-4/16/18. Her hospital course was as follows "Patient was admitted to " "hemonc service on 4/9 with acute hypoxic respiratory failure. She was requiring 4 L of nc on admission. She was started on moxi for CAP. She received one dose of ceftriaxone in the ED. On 2nd day of admission she spiked a fever. Her Hb was ~7 g/dl so she was transfused 1 unit of PRBCs. Over night she developed worsening of her symptoms with increased O2 requirements to 15 L HFNC. Her CXR showed worsening congestion. There was a concern of TRALI vs TACO. New 2D echo with diastolic dysfunction. She was given IV lasix pushes as needed and was started on dexamethasone.  Her abx was escalated to vanc and cefepime. She improved with diuresis and steroids. Pulmonary were consulted. Her O2 requirements continued to improve. On 4/15 she was switched to Augmentin. PT recommended discharging her to SNF but the patient refused and she wanted to go home with home health. She qualified for home oxygen so she was discharged on 3 L nc while at rest and 6 while active. Augmentin and dexamethasone were discontinued on discharge"     She was readmitted from 4/27 to 5/3/18 with who presented to the ED with a complaint of fatigue and worsening DELA CRUZ. Pt diagnosed PNA 4/9 and was discharged on 4/16 on 3L oxygen. She was initially placed on cefepime for 3 days followed by an add'l 2 days of Augmentin. Pulm was consulted with assistance as her oxygen requirements were increasing. She was placed on oral steroids, then trailed on 80mg TID solumedrol for 2 days and will be discharged on a prednisone taper. She was also diuresed with 2 days of 40mg IV lasix with appropriate UOP resulting, improvement on repeat CXR. She was medically stable and appropriate for DC home with home health on 1L continuous O2. She will be seen for close f/u and may be able to come off oxygen at that time.      She discontinued Tagrisso in April 2018. Restaging scans from 6/4/18 revealed "Stable appearance of a spiculated right upper lobe pulmonary lesion.  Additional " "irregular focus superior to the lesion in the right lung apex is stable and may represent scar or additional lesion; although this was not hypermetabolic on prior PET-CT.  No new pulmonary lesions. 1.3 cm subcarinal lymph node, not hypermetabolic on prior PET-CT. Stable postsurgical changes of a Whipple procedure. Bilateral nonobstructing nephrolithiasis.  Stable sclerotic focus in the T5 vertebral body, possibly a bone island as this was not hypermetabolic on prior PET-CT. Small hiatal hernia. RECIST SUMMARY: Date of prior examination for comparison 01/30/2018 Lesion 1: Right upper lobe 1.3 cm Series 2 image 31 prior measurement 1.3 cm"  Bone scan also from 6/4/18 revealed no evidence of mets.     Her PET scan from 7/11/18 revealed "Right suprahilar lesion SUV max 3.76, previously 6.86. Pretracheal lymph node SUV max 2.55, previously 3.79. There is physiologic intracranial, head, and neck activity.  There is muscle activation.  There is vocal cord activation.  There is physiologic liver, spleen, GI and  activity.  There is a hiatal hernia.  Pelvic organs show nothing unusual.  No bone lesions are seen.  There are areas of benign appearing lung scar"  She notes fatigue.  She denies any nausea, vomiting, diarrhea, constipation, abdominal pain, weight loss or loss of appetite, chest pain, shortness of breath, leg swelling, fatigue, pain, headache, dizziness, or mood changes. Her ECOG PS is 2. She is accompanied by her  and son     She has been on Tarceva since 6/5/18          HPI Her PET scan from 9/13/18 revealed "Stable appearance and FDG avidity of the paratracheal and right suprahilar lymph node. No new areas of abnormal radiotracer uptake"  She was hospitalized from 9/24-9/27/18 with SBO felt to be related to severe constipation. This resolved after several enemas. Her TArceva was held as her bilirubin was noted to be elevated at 2.3.  She feels well now and denies any new issues. She is accompanied by " her son as she notes her  is in the hospital as well with stroke.      Review of Systems   Constitutional: Negative for appetite change, fatigue and unexpected weight change.   HENT: Negative for mouth sores.    Eyes: Negative for visual disturbance.   Respiratory: Negative for cough and shortness of breath.    Cardiovascular: Negative for chest pain.   Gastrointestinal: Negative for abdominal pain and diarrhea.   Genitourinary: Negative for frequency.   Musculoskeletal: Negative for back pain.   Skin: Negative for rash.   Neurological: Negative for headaches.   Hematological: Negative for adenopathy.   Psychiatric/Behavioral: The patient is not nervous/anxious.    All other systems reviewed and are negative.      Objective:      Physical Exam   Constitutional: She is oriented to person, place, and time. She appears well-developed and well-nourished.   HENT:   Mouth/Throat: No oropharyngeal exudate.   Cardiovascular: Normal rate and normal heart sounds.   Pulmonary/Chest: Effort normal and breath sounds normal. She has no wheezes.   Abdominal: Soft. Bowel sounds are normal. There is no tenderness.   Musculoskeletal: She exhibits no edema or tenderness.   Lymphadenopathy:     She has no cervical adenopathy.   Neurological: She is alert and oriented to person, place, and time. Coordination normal.   Skin: Skin is warm and dry. No rash noted.   Psychiatric: She has a normal mood and affect. Judgment and thought content normal.   Vitals reviewed.      LABS:  WBC   Date Value Ref Range Status   09/25/2018 5.30 3.90 - 12.70 K/uL Final     Hemoglobin   Date Value Ref Range Status   09/25/2018 10.9 (L) 12.0 - 16.0 g/dL Final     POC Hematocrit   Date Value Ref Range Status   01/12/2016 25 (L) 36 - 54 %PCV Final     Hematocrit   Date Value Ref Range Status   09/25/2018 35.7 (L) 37.0 - 48.5 % Final     Platelets   Date Value Ref Range Status   09/25/2018 211 150 - 350 K/uL Final     Gran # (ANC)   Date Value Ref Range  Status   09/25/2018 3.1 1.8 - 7.7 K/uL Final     Comment:     The ANC is based on a white cell differential from an   automated cell counter. It has not been microscopically   reviewed for the presence of abnormal cells. Clinical   correlation is required.         Chemistry        Component Value Date/Time     09/25/2018 1327    K 3.7 09/25/2018 1327     (H) 09/25/2018 1327    CO2 23 09/25/2018 1327    BUN 23 09/25/2018 1327    CREATININE 1.9 (H) 09/25/2018 1327     (H) 09/25/2018 1327        Component Value Date/Time    CALCIUM 7.8 (L) 09/25/2018 1327    ALKPHOS 94 09/25/2018 1327    AST 31 09/25/2018 1327    ALT 34 09/25/2018 1327    BILITOT 1.4 (H) 09/25/2018 1327    ESTGFRAFRICA 29.5 (A) 09/25/2018 1327    EGFRNONAA 25.6 (A) 09/25/2018 1327          Assessment:       1. Malignant neoplasm of lower lobe of right lung    2. Secondary cancer of bone    3. Brain metastases    4. Elevated bilirubin    5. Stage 3 chronic kidney disease    6. Anemia in neoplastic disease        Plan:         1,2,3,4. Bilirubin is better at 1.4.  She will restart Tarceva and I will monitor labs weekly. Creatinine is up today, advised her to continue oral hydration. H/H is lower than baseline. She denies blood in stool. Advised her to monitor that closely. Will repeat labs in 1 week    Above care plan was discussed with patient and accompanying son and all questions were addressed to their satisfaction

## 2018-09-25 NOTE — Clinical Note
Schedule CBC,CMP, weekly once a week in [facility] mid morning (between now and mid October) and then see me on 10/16 (as scheduled)

## 2018-09-25 NOTE — TELEPHONE ENCOUNTER
----- Message from Penny Cohen sent at 9/25/2018  8:25 AM CDT -----  Contact: Pt Son   Pt son called to speak with nurse Zandra che to follow up for pt being discharge from the hospital   Callback#[phone]  Thank You  LOLLY Cohen

## 2018-09-25 NOTE — TELEPHONE ENCOUNTER
----- Message from Willie Ramirez sent at 9/25/2018  9:36 AM CDT -----  Contact: Basil-Son  Will like a call from Zandra in regards to transportation for appt on today with dr sullivan     Contact::[phone]

## 2018-09-27 ENCOUNTER — TELEPHONE (OUTPATIENT)
Dept: HEMATOLOGY/ONCOLOGY | Facility: CLINIC | Age: 73
End: 2018-09-27

## 2018-09-27 DIAGNOSIS — F32.A DEPRESSION, UNSPECIFIED DEPRESSION TYPE: ICD-10-CM

## 2018-09-27 RX ORDER — AMITRIPTYLINE HYDROCHLORIDE 50 MG/1
TABLET, FILM COATED ORAL
Qty: 30 TABLET | Refills: 3 | Status: SHIPPED | OUTPATIENT
Start: 2018-09-27 | End: 2019-02-01 | Stop reason: SDUPTHER

## 2018-09-27 NOTE — TELEPHONE ENCOUNTER
----- Message from Penny Cohen sent at 9/27/2018 10:58 AM CDT -----  Contact: Pt son  Pt son called to speak with nurse wen have questions   Callback#[phone]  Thank You  LOLLY Cohen

## 2018-09-27 NOTE — TELEPHONE ENCOUNTER
Spoke with patient's son, shirley.  He is calling to review appointments with nurse.  Nurse answered all son's questions to his satisfaction.

## 2018-10-01 NOTE — PROGRESS NOTES
Arranged for transportation to labs/MD appt. On 9/25/18 with  set for 1:00pm. Transportation arranged thru [employer] ([phone]). Pt. And family aware of the plan. Will follow.

## 2018-10-03 ENCOUNTER — LAB VISIT (OUTPATIENT)
Dept: LAB | Facility: HOSPITAL | Age: 73
End: 2018-10-03
Attending: INTERNAL MEDICINE
Payer: MEDICARE

## 2018-10-03 DIAGNOSIS — I27.82 OTHER CHRONIC PULMONARY EMBOLISM WITHOUT ACUTE COR PULMONALE: ICD-10-CM

## 2018-10-03 DIAGNOSIS — C34.90 MALIGNANT NEOPLASM OF LUNG, UNSPECIFIED LATERALITY, UNSPECIFIED PART OF LUNG: ICD-10-CM

## 2018-10-03 LAB
ALBUMIN SERPL BCP-MCNC: 2.6 G/DL
ALP SERPL-CCNC: 79 U/L
ALT SERPL W/O P-5'-P-CCNC: 21 U/L
ANION GAP SERPL CALC-SCNC: 7 MMOL/L
AST SERPL-CCNC: 27 U/L
BILIRUB SERPL-MCNC: 0.9 MG/DL
BUN SERPL-MCNC: 21 MG/DL
CALCIUM SERPL-MCNC: 8.7 MG/DL
CHLORIDE SERPL-SCNC: 115 MMOL/L
CO2 SERPL-SCNC: 21 MMOL/L
CREAT SERPL-MCNC: 1.5 MG/DL
ERYTHROCYTE [DISTWIDTH] IN BLOOD BY AUTOMATED COUNT: 12.3 %
EST. GFR  (AFRICAN AMERICAN): 39.3 ML/MIN/1.73 M^2
EST. GFR  (NON AFRICAN AMERICAN): 34.1 ML/MIN/1.73 M^2
GLUCOSE SERPL-MCNC: 118 MG/DL
HCT VFR BLD AUTO: 35.6 %
HGB BLD-MCNC: 10.8 G/DL
IMM GRANULOCYTES # BLD AUTO: 0.03 K/UL
MCH RBC QN AUTO: 28.9 PG
MCHC RBC AUTO-ENTMCNC: 30.3 G/DL
MCV RBC AUTO: 95 FL
NEUTROPHILS # BLD AUTO: 2.5 K/UL
PLATELET # BLD AUTO: 263 K/UL
PMV BLD AUTO: 9.6 FL
POTASSIUM SERPL-SCNC: 3.7 MMOL/L
PROT SERPL-MCNC: 5 G/DL
RBC # BLD AUTO: 3.74 M/UL
SODIUM SERPL-SCNC: 143 MMOL/L
WBC # BLD AUTO: 5.03 K/UL

## 2018-10-03 PROCEDURE — 85027 COMPLETE CBC AUTOMATED: CPT

## 2018-10-03 PROCEDURE — 36415 COLL VENOUS BLD VENIPUNCTURE: CPT

## 2018-10-03 PROCEDURE — 80053 COMPREHEN METABOLIC PANEL: CPT

## 2018-10-03 NOTE — TELEPHONE ENCOUNTER
----- Message from Kim Holt sent at 10/3/2018  9:48 AM CDT -----  Contact: Pt  Pt calling stating that she has been waiting days for Eliquis to be called in and it still has not been sent.     Pharmacy number [phone]    Pt call back number [phone]

## 2018-10-10 ENCOUNTER — TELEPHONE (OUTPATIENT)
Dept: HEMATOLOGY/ONCOLOGY | Facility: CLINIC | Age: 73
End: 2018-10-10

## 2018-10-10 NOTE — TELEPHONE ENCOUNTER
----- Message from Willie Ramirez sent at 10/10/2018 11:43 AM CDT -----  Contact: Basil- Son   Will like a call from Zandra in regards to pt recent test     Contact::[phone]

## 2018-10-10 NOTE — TELEPHONE ENCOUNTER
----- Message from Latoya Aguilar sent at 10/10/2018  4:16 PM CDT -----  Contact: Pt's son   Pt's son is requesting to speak with Zandra. Pt's son is trying to schedule pt's appt.    Pt's son can be reached at [phone].    Thank you

## 2018-10-16 ENCOUNTER — TELEPHONE (OUTPATIENT)
Dept: HEMATOLOGY/ONCOLOGY | Facility: CLINIC | Age: 73
End: 2018-10-16

## 2018-10-16 DIAGNOSIS — R50.9 FEVER, UNSPECIFIED FEVER CAUSE: Primary | ICD-10-CM

## 2018-10-16 NOTE — TELEPHONE ENCOUNTER
Spoke with patient's son. He notes patient experiencing weakness, chills, low grade temperature 99.9-100.0F. He feels she is eating less and may be dehydrated. Son states he doesn't want the patient to end up in the hospital like she did the past couple times.  nurse explained to patient's son she may need to go to the ED tonight to be evaluated, as clinic is closed presently----and it may not be [place] for her to wait until tomorrow. Patient is not scheduled to see dr sullivan until next Monday.  Nurse informed son she would contact him back after speaking with dr sullivan.  Son voiced understanding.        Nurse spoke with patient. She denies all symptoms except for 99.7F temperature and chills. She states she overall feels OK. Is eating/drinking fine.    Message routed to dr sullivan

## 2018-10-16 NOTE — TELEPHONE ENCOUNTER
k just to be on the safe side, check labs, Chest xray and UA tomorrow. I wouldn't worry about a 99.7

## 2018-10-16 NOTE — TELEPHONE ENCOUNTER
----- Message from Penny Cohen sent at 10/16/2018  3:24 PM CDT -----  Contact: Pt son  Pt son called to speak with nurse wen have a few questions  Callback#[phone]  Thank You  LOLLY Cohen

## 2018-10-16 NOTE — TELEPHONE ENCOUNTER
----- Message from Willie Ramirez sent at 10/16/2018  3:58 PM CDT -----  Contact: Basil   Will like a call from Zandra in regards to pt being seen on tomorrow     Contact::[phone]

## 2018-10-16 NOTE — TELEPHONE ENCOUNTER
Spoke with patient and son.  Coordinated all appointments for tomorrow.  Patient understands if her symptoms worsen overnight to proceed to the ED.

## 2018-10-17 ENCOUNTER — HOSPITAL ENCOUNTER (OUTPATIENT)
Dept: RADIOLOGY | Facility: HOSPITAL | Age: 73
Discharge: HOME | End: 2018-10-17
Attending: INTERNAL MEDICINE
Payer: MEDICARE

## 2018-10-17 DIAGNOSIS — R50.9 FEVER, UNSPECIFIED FEVER CAUSE: ICD-10-CM

## 2018-10-17 PROCEDURE — 71046 X-RAY EXAM CHEST 2 VIEWS: CPT | Mod: 26,,, | Performed by: RADIOLOGY

## 2018-10-17 PROCEDURE — 71046 X-RAY EXAM CHEST 2 VIEWS: CPT | Mod: TC

## 2018-10-19 ENCOUNTER — TELEPHONE (OUTPATIENT)
Dept: HEMATOLOGY/ONCOLOGY | Facility: CLINIC | Age: 73
End: 2018-10-19

## 2018-10-19 NOTE — TELEPHONE ENCOUNTER
Spoke with son and patient.  Informed them both her testing earlier this week was OK.  Appointments confirmed on Monday.    Patient denies any recurrent fevers.

## 2018-10-19 NOTE — TELEPHONE ENCOUNTER
----- Message from Kim Holt sent at 10/19/2018  9:00 AM CDT -----  Contact: Pt son  Pt son calling regarding test results from Wednesday       Basil call back number [phone]

## 2018-10-22 ENCOUNTER — INFUSION (OUTPATIENT)
Dept: INFUSION THERAPY | Facility: HOSPITAL | Age: 73
End: 2018-10-22
Attending: INTERNAL MEDICINE
Payer: MEDICARE

## 2018-10-22 ENCOUNTER — TELEPHONE (OUTPATIENT)
Dept: HEMATOLOGY/ONCOLOGY | Facility: CLINIC | Age: 73
End: 2018-10-22

## 2018-10-22 ENCOUNTER — OFFICE VISIT (OUTPATIENT)
Dept: HEMATOLOGY/ONCOLOGY | Facility: CLINIC | Age: 73
End: 2018-10-22
Payer: MEDICARE

## 2018-10-22 VITALS
OXYGEN SATURATION: 99 % | WEIGHT: 139.75 LBS | DIASTOLIC BLOOD PRESSURE: 73 MMHG | SYSTOLIC BLOOD PRESSURE: 173 MMHG | RESPIRATION RATE: 18 BRPM | TEMPERATURE: 98 F | HEART RATE: 71 BPM | BODY MASS INDEX: 22.46 KG/M2 | HEIGHT: 66 IN

## 2018-10-22 DIAGNOSIS — C34.31 MALIGNANT NEOPLASM OF LOWER LOBE OF RIGHT LUNG: Primary | ICD-10-CM

## 2018-10-22 DIAGNOSIS — C79.31 BRAIN METASTASES: ICD-10-CM

## 2018-10-22 DIAGNOSIS — C79.51 SECONDARY CANCER OF BONE: ICD-10-CM

## 2018-10-22 PROCEDURE — 99999 PR PBB SHADOW E&M-EST. PATIENT-LVL V: CPT | Mod: PBBFAC,,, | Performed by: INTERNAL MEDICINE

## 2018-10-22 PROCEDURE — 96372 THER/PROPH/DIAG INJ SC/IM: CPT

## 2018-10-22 PROCEDURE — 3078F DIAST BP <80 MM HG: CPT | Mod: CPTII,,, | Performed by: INTERNAL MEDICINE

## 2018-10-22 PROCEDURE — 63600175 PHARM REV CODE 636 W HCPCS: Mod: JG | Performed by: INTERNAL MEDICINE

## 2018-10-22 PROCEDURE — 1101F PT FALLS ASSESS-DOCD LE1/YR: CPT | Mod: CPTII,,, | Performed by: INTERNAL MEDICINE

## 2018-10-22 PROCEDURE — 3077F SYST BP >= 140 MM HG: CPT | Mod: CPTII,,, | Performed by: INTERNAL MEDICINE

## 2018-10-22 PROCEDURE — 99215 OFFICE O/P EST HI 40 MIN: CPT | Mod: PBBFAC,25 | Performed by: INTERNAL MEDICINE

## 2018-10-22 PROCEDURE — 99215 OFFICE O/P EST HI 40 MIN: CPT | Mod: S$PBB,,, | Performed by: INTERNAL MEDICINE

## 2018-10-22 RX ADMIN — DENOSUMAB 120 MG: 120 INJECTION SUBCUTANEOUS at 03:10

## 2018-10-22 NOTE — TELEPHONE ENCOUNTER
----- Message from Willie Ramirez sent at 10/22/2018 10:12 AM CDT -----  Contact: Basil De Paz call     Contact::[phone]

## 2018-10-22 NOTE — PROGRESS NOTES
"Subjective:       Patient ID: Naty St is a 73 y.o. female.    Chief Complaint: Lung Cancer and Fatigue  Oncologic History:  Ms. Naty St was admitted to the hospital between 01/25/2016 and 01/28/2016. She has a history of pancreatic cancer 17 years ago, breast cancer and meningioma, presented to the hospital complaining of loss of consciousness. The patient apparently was in her normal state of health and she stood up to go to the bathroom and fell to the floor. The patient's daughter helped the mother up in the bathroom and noted that her mother's upper extremities were shaking and eye rolling. No reports of bowel or bladder incontinence, tongue biting or rolling. The second episode lasted about four minutes and she was extremely lethargic following that. Apparently, the patient had a meningioma resection done in the past; however, recently, underwent neurosurgical resection with Dr. 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    Additional immunohistochemical stains were performed which revealed the tumor cells to be are positive for TTF1 and negative for ER and GCDFP. The morphology and TTF1 positivity are most consistent with lung primary.    Of note, imaging scan at the end of January 2016 revealed a mass in the lung at 2 cm in the medial aspect of the apical segment of the right upper lobe abutting the mediastinum at the level of the azygous vein and abutting and possibly encasing the segmental bronchi and vessels of the apical segment of the right upper lobe and no pleural fluid was present. Also, there is an enlarged right paratracheal lymph node. No evidence of any metastatic disease at the pancreatic site with postoperative changes post Whipple disease  Her PET Scan from 2/15/16 reveal "Hypermetabolic mass in the right lung apex consistent with a primary malignancy. " "Hypermetabolic mediastinal lymph nodes consistent with metastatic disease. Right sacral hypermetabolic lesion consistent with metastatic disease, noting additional mildly sclerotic lesions in multiple vertebral bodies which do not demonstrate abnormal hypermetabolism  She underwent IR bone biopsy which revealed metastatic adenocarcinoma of lung origin. 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  She has completed focal RT to brain lesions in March 2016.    PET scan from 6/6/16 shows "Dramatic almost complete response to therapy."  Lovenox started after US lower ext 6/7/16 shows Acute complete occlusion of one of the left posterior tibial vein.Remote partial thrombus of the proximal left superficial femoral vein.  She is on Tarceva.   12/6/16 PET scan reveals "Stable right upper lobe lesion and right hilar lymph node. No new lesions identified. Trace left pleural effusion".  1/27/17 Renal u/s "Medical renal disease. Nonobstructive right nephrolithiasis"  1/31/17 PET - "Right upper lobe nodule and right hilar lymph node similar and very low grade activity.  There is no definite evidence of recurrence."   11/9/17 PET "In this patient with history of lung cancer, there is interval increase in size and hypermetabolism of a right upper lobe lung lesion concerning for recurrent disease. Additionally, there is interval appearance of a hypermetabolic paratracheal lymph node suspicious for metastatic disease"                 She progressed on Tarceva She underwent EBUS on 12/5/17. Pathology revealed adenocarcinoma but T790m could not be done as quantity was not insufficient. Her PET scan from 1/30/18 revealed The patient's previously identified abnormal lesions is slightly greater uptake of FDG on today's study compared with prior exam. These findings are concerning for progression of disease"      She was hospitalized between 4/9/18-4/16/18. Her hospital course was as follows "Patient was admitted to hemonc service on 4/9 " "with acute hypoxic respiratory failure. She was requiring 4 L of nc on admission. She was started on moxi for CAP. She received one dose of ceftriaxone in the ED. On 2nd day of admission she spiked a fever. Her Hb was ~7 g/dl so she was transfused 1 unit of PRBCs. Over night she developed worsening of her symptoms with increased O2 requirements to 15 L HFNC. Her CXR showed worsening congestion. There was a concern of TRALI vs TACO. New 2D echo with diastolic dysfunction. She was given IV lasix pushes as needed and was started on dexamethasone.  Her abx was escalated to vanc and cefepime. She improved with diuresis and steroids. Pulmonary were consulted. Her O2 requirements continued to improve. On 4/15 she was switched to Augmentin. PT recommended discharging her to SNF but the patient refused and she wanted to go home with home health. She qualified for home oxygen so she was discharged on 3 L nc while at rest and 6 while active. Augmentin and dexamethasone were discontinued on discharge"     She was readmitted from 4/27 to 5/3/18 with who presented to the ED with a complaint of fatigue and worsening DELA CRUZ. Pt diagnosed PNA 4/9 and was discharged on 4/16 on 3L oxygen. She was initially placed on cefepime for 3 days followed by an add'l 2 days of Augmentin. Pulm was consulted with assistance as her oxygen requirements were increasing. She was placed on oral steroids, then trailed on 80mg TID solumedrol for 2 days and will be discharged on a prednisone taper. She was also diuresed with 2 days of 40mg IV lasix with appropriate UOP resulting, improvement on repeat CXR. She was medically stable and appropriate for DC home with home health on 1L continuous O2. She will be seen for close f/u and may be able to come off oxygen at that time.      She discontinued Tagrisso in April 2018. Restaging scans from 6/4/18 revealed "Stable appearance of a spiculated right upper lobe pulmonary lesion.  Additional irregular focus " "superior to the lesion in the right lung apex is stable and may represent scar or additional lesion; although this was not hypermetabolic on prior PET-CT.  No new pulmonary lesions. 1.3 cm subcarinal lymph node, not hypermetabolic on prior PET-CT. Stable postsurgical changes of a Whipple procedure. Bilateral nonobstructing nephrolithiasis.  Stable sclerotic focus in the T5 vertebral body, possibly a bone island as this was not hypermetabolic on prior PET-CT. Small hiatal hernia. RECIST SUMMARY: Date of prior examination for comparison 01/30/2018 Lesion 1: Right upper lobe 1.3 cm Series 2 image 31 prior measurement 1.3 cm"  Bone scan also from 6/4/18 revealed no evidence of mets.     Her PET scan from 7/11/18 revealed "Right suprahilar lesion SUV max 3.76, previously 6.86. Pretracheal lymph node SUV max 2.55, previously 3.79. There is physiologic intracranial, head, and neck activity.  There is muscle activation.  There is vocal cord activation.  There is physiologic liver, spleen, GI and  activity.  There is a hiatal hernia.  Pelvic organs show nothing unusual.  No bone lesions are seen.  There are areas of benign appearing lung scar"  She notes fatigue.  She denies any nausea, vomiting, diarrhea, constipation, abdominal pain, weight loss or loss of appetite, chest pain, shortness of breath, leg swelling, fatigue, pain, headache, dizziness, or mood changes. Her ECOG PS is 2. She is accompanied by her  and son     She has been on Tarceva since 6/5/18     HPIShe comes to review labs on Tarceva  She notes fatigue    Review of Systems   Constitutional: Negative for appetite change, fatigue and unexpected weight change.   HENT: Negative for mouth sores.    Eyes: Negative for visual disturbance.   Respiratory: Negative for cough and shortness of breath.    Cardiovascular: Negative for chest pain.   Gastrointestinal: Negative for abdominal pain and diarrhea.   Genitourinary: Negative for frequency. "   Musculoskeletal: Negative for back pain.   Skin: Negative for rash.   Neurological: Negative for headaches.   Hematological: Negative for adenopathy.   Psychiatric/Behavioral: The patient is not nervous/anxious.    All other systems reviewed and are negative.      Objective:      Physical Exam   Constitutional: She is oriented to person, place, and time. She appears well-developed and well-nourished.   HENT:   Mouth/Throat: No oropharyngeal exudate.   Cardiovascular: Normal rate and normal heart sounds.   Pulmonary/Chest: Effort normal and breath sounds normal. She has no wheezes.   Abdominal: Soft. Bowel sounds are normal. There is no tenderness.   Musculoskeletal: She exhibits no edema or tenderness.   Lymphadenopathy:     She has no cervical adenopathy.   Neurological: She is alert and oriented to person, place, and time. Coordination normal.   Skin: Skin is warm and dry. No rash noted.   Psychiatric: She has a normal mood and affect. Judgment and thought content normal.   Vitals reviewed.      LABS:  WBC   Date Value Ref Range Status   10/22/2018 5.01 3.90 - 12.70 K/uL Final     Hemoglobin   Date Value Ref Range Status   10/22/2018 11.5 (L) 12.0 - 16.0 g/dL Final     POC Hematocrit   Date Value Ref Range Status   01/12/2016 25 (L) 36 - 54 %PCV Final     Hematocrit   Date Value Ref Range Status   10/22/2018 37.2 37.0 - 48.5 % Final     Platelets   Date Value Ref Range Status   10/22/2018 208 150 - 350 K/uL Final     Gran # (ANC)   Date Value Ref Range Status   10/22/2018 2.7 1.8 - 7.7 K/uL Final     Comment:     The ANC is based on a white cell differential from an   automated cell counter. It has not been microscopically   reviewed for the presence of abnormal cells. Clinical   correlation is required.         Chemistry        Component Value Date/Time     10/22/2018 1405    K 3.9 10/22/2018 1405     10/22/2018 1405    CO2 26 10/22/2018 1405    BUN 17 10/22/2018 1405    CREATININE 1.3 10/22/2018  1405    GLU 92 10/22/2018 1405        Component Value Date/Time    CALCIUM 8.6 (L) 10/22/2018 1405    ALKPHOS 180 (H) 10/22/2018 1405    AST 51 (H) 10/22/2018 1405     (H) 10/22/2018 1405    BILITOT 1.2 (H) 10/22/2018 1405    ESTGFRAFRICA 46.7 (A) 10/22/2018 1405    EGFRNONAA 40.5 (A) 10/22/2018 1405           Assessment:       1. Malignant neoplasm of lower lobe of right lung    2. Brain metastases    3. Secondary cancer of bone        Plan:        1,2,3. She will continue on Tarceva and will return in 4 weeks with PET scan and MRI brain. She will receive Xgeav today and if her PET scan reveals no evidence of recurrence, will switch to Xgeva every 3 months    Above care plan was discussed with patient and accompanying  and all questions were addressed to their satisfaction       .

## 2018-10-22 NOTE — TELEPHONE ENCOUNTER
Spoke with patient's son. He is calling to state he will not be at her clinic visit today. He is calling to ask dr sullivan to address her xgeva---and if it will remain monthly or go to every 2 months. Nurse explained to son she has never heard of the every 2 month dosing---but nurse would ask dr sullivan.  Also, son is wanting to know when her next MRI brain will be obtained.    Nurse informed son she would forward his concerns to dr sullivan---and they will be addressed at her clinic visit this afternoon.  Son thanked nurse.

## 2018-10-22 NOTE — NURSING
1600- Patient arrived ambulatory to the unit for injection.  Labs reviewed, corrected calcium okay for injection today.  Patient reports that she is taking calcium and vitamin d as instructed and denies any jaw pain.  Patient also reports no recent nor any upcoming invasive dental work.  Injection administered to abdomen, tolerated well.  Patient discharged to home setting.

## 2018-10-22 NOTE — TELEPHONE ENCOUNTER
----- Message from Willie Ramirez sent at 10/22/2018  9:48 AM CDT -----  Contact: Basil- Son  Will like a call from Zandra in regards to questions concerning pt     Contact::[phone]

## 2018-10-23 ENCOUNTER — TELEPHONE (OUTPATIENT)
Dept: ENDOSCOPY | Facility: HOSPITAL | Age: 73
End: 2018-10-23

## 2018-10-23 DIAGNOSIS — Z12.11 SPECIAL SCREENING FOR MALIGNANT NEOPLASMS, COLON: Primary | ICD-10-CM

## 2018-10-23 RX ORDER — SODIUM, POTASSIUM,MAG SULFATES 17.5-3.13G
1 SOLUTION, RECONSTITUTED, ORAL ORAL DAILY
Qty: 354 ML | Refills: 0 | Status: SHIPPED | OUTPATIENT
Start: 2018-10-23 | End: 2018-11-01

## 2018-10-23 NOTE — TELEPHONE ENCOUNTER
Karrie Vazquez MD routed conversation to You 27 minutes ago (11:00 AM)      Karrie Vazquez MD 27 minutes ago (11:00 AM)         yes         Documentation        You routed conversation to Karrie Vazquez MD 39 minutes ago (10:48 AM)      You 40 minutes ago (10:47 AM)         Can Pt hold Eliquis x 2 days prior to colonoscopy  per endoscopy protocol?   Please advise.     Thank you            Documentation

## 2018-10-23 NOTE — TELEPHONE ENCOUNTER
Can Pt hold Eliquis x 2 days prior to colonoscopy  per endoscopy protocol?   Please advise.     Thank you

## 2018-10-24 RX ORDER — LORAZEPAM 1 MG/1
TABLET ORAL
Qty: 60 TABLET | Refills: 2 | Status: SHIPPED | OUTPATIENT
Start: 2018-10-24 | End: 2019-05-20 | Stop reason: SDUPTHER

## 2018-10-30 ENCOUNTER — TELEPHONE (OUTPATIENT)
Dept: HEMATOLOGY/ONCOLOGY | Facility: CLINIC | Age: 73
End: 2018-10-30

## 2018-10-30 NOTE — TELEPHONE ENCOUNTER
----- Message from Penny Cohen sent at 10/30/2018 10:44 AM CDT -----  Contact: pt   Pt called to speak with nurse Zandra Kennedy#[phone]  Thank You  LOLLY Cohen

## 2018-10-30 NOTE — TELEPHONE ENCOUNTER
Spoke with patient.  She is calling to speak with dr greco's nurse about the prep for her colonoscopy.  Nurse informed patient she would forward message to nurse, as nurse is not certain why the prep is different than last time, according to patient, that is.    Message routed to dr greco's team

## 2018-10-31 DIAGNOSIS — C34.31 MALIGNANT NEOPLASM OF LOWER LOBE OF RIGHT LUNG: ICD-10-CM

## 2018-10-31 RX ORDER — ERLOTINIB HYDROCHLORIDE 150 MG/1
TABLET ORAL
Qty: 30 TABLET | Refills: 1 | Status: SHIPPED | OUTPATIENT
Start: 2018-10-31 | End: 2018-12-17 | Stop reason: SDUPTHER

## 2018-11-02 RX ORDER — ATORVASTATIN CALCIUM 40 MG/1
TABLET, FILM COATED ORAL
Qty: 90 TABLET | Refills: 3 | Status: SHIPPED | OUTPATIENT
Start: 2018-11-02 | End: 2019-07-10 | Stop reason: SDUPTHER

## 2018-11-02 RX ORDER — ATORVASTATIN CALCIUM 40 MG/1
TABLET, FILM COATED ORAL
Qty: 90 TABLET | Refills: 3 | OUTPATIENT
Start: 2018-11-02

## 2018-11-06 ENCOUNTER — ANESTHESIA EVENT (OUTPATIENT)
Dept: ENDOSCOPY | Facility: HOSPITAL | Age: 73
End: 2018-11-06
Payer: MEDICARE

## 2018-11-06 DIAGNOSIS — L27.0 ACNEIFORM DRUG ERUPTION: ICD-10-CM

## 2018-11-06 RX ORDER — CLINDAMYCIN PHOSPHATE 10 MG/G
GEL TOPICAL
Qty: 60 G | Refills: 6 | Status: SHIPPED | OUTPATIENT
Start: 2018-11-06 | End: 2019-07-10 | Stop reason: SDUPTHER

## 2018-11-07 ENCOUNTER — HOSPITAL ENCOUNTER (OUTPATIENT)
Facility: HOSPITAL | Age: 73
Discharge: HOME | End: 2018-11-07
Attending: INTERNAL MEDICINE | Admitting: INTERNAL MEDICINE
Payer: MEDICARE

## 2018-11-07 ENCOUNTER — ANESTHESIA (OUTPATIENT)
Dept: ENDOSCOPY | Facility: HOSPITAL | Age: 73
End: 2018-11-07
Payer: MEDICARE

## 2018-11-07 VITALS
SYSTOLIC BLOOD PRESSURE: 136 MMHG | HEIGHT: 66 IN | WEIGHT: 130 LBS | OXYGEN SATURATION: 99 % | RESPIRATION RATE: 22 BRPM | DIASTOLIC BLOOD PRESSURE: 63 MMHG | TEMPERATURE: 97 F | HEART RATE: 79 BPM | BODY MASS INDEX: 20.89 KG/M2

## 2018-11-07 DIAGNOSIS — Z12.11 ENCOUNTER FOR SCREENING COLONOSCOPY: Primary | ICD-10-CM

## 2018-11-07 PROCEDURE — 25000003 PHARM REV CODE 250: Performed by: INTERNAL MEDICINE

## 2018-11-07 PROCEDURE — G0105 COLORECTAL SCRN; HI RISK IND: HCPCS | Mod: 53,,, | Performed by: INTERNAL MEDICINE

## 2018-11-07 PROCEDURE — 37000009 HC ANESTHESIA EA ADD 15 MINS: Performed by: INTERNAL MEDICINE

## 2018-11-07 PROCEDURE — E9220 PRA ENDO ANESTHESIA: HCPCS | Mod: ,,, | Performed by: NURSE ANESTHETIST, CERTIFIED REGISTERED

## 2018-11-07 PROCEDURE — 63600175 PHARM REV CODE 636 W HCPCS: Performed by: NURSE ANESTHETIST, CERTIFIED REGISTERED

## 2018-11-07 PROCEDURE — 37000008 HC ANESTHESIA 1ST 15 MINUTES: Performed by: INTERNAL MEDICINE

## 2018-11-07 PROCEDURE — G0105 COLORECTAL SCRN; HI RISK IND: HCPCS | Performed by: INTERNAL MEDICINE

## 2018-11-07 RX ORDER — SODIUM CHLORIDE 0.9 % (FLUSH) 0.9 %
3 SYRINGE (ML) INJECTION
Status: DISCONTINUED | OUTPATIENT
Start: 2018-11-07 | End: 2018-11-07 | Stop reason: HOSPADM

## 2018-11-07 RX ORDER — SODIUM CHLORIDE 9 MG/ML
INJECTION, SOLUTION INTRAVENOUS CONTINUOUS
Status: DISCONTINUED | OUTPATIENT
Start: 2018-11-07 | End: 2018-11-07 | Stop reason: HOSPADM

## 2018-11-07 RX ORDER — PROPOFOL 10 MG/ML
VIAL (ML) INTRAVENOUS CONTINUOUS PRN
Status: DISCONTINUED | OUTPATIENT
Start: 2018-11-07 | End: 2018-11-07

## 2018-11-07 RX ORDER — LIDOCAINE HCL/PF 100 MG/5ML
SYRINGE (ML) INTRAVENOUS
Status: DISCONTINUED | OUTPATIENT
Start: 2018-11-07 | End: 2018-11-07

## 2018-11-07 RX ORDER — PROPOFOL 10 MG/ML
VIAL (ML) INTRAVENOUS
Status: DISCONTINUED | OUTPATIENT
Start: 2018-11-07 | End: 2018-11-07

## 2018-11-07 RX ADMIN — LIDOCAINE HYDROCHLORIDE 50 MG: 20 INJECTION, SOLUTION INTRAVENOUS at 12:11

## 2018-11-07 RX ADMIN — PROPOFOL 60 MG: 10 INJECTION, EMULSION INTRAVENOUS at 12:11

## 2018-11-07 RX ADMIN — SODIUM CHLORIDE: 0.9 INJECTION, SOLUTION INTRAVENOUS at 10:11

## 2018-11-07 RX ADMIN — PROPOFOL 175 MCG/KG/MIN: 10 INJECTION, EMULSION INTRAVENOUS at 12:11

## 2018-11-07 NOTE — TRANSFER OF CARE
"Anesthesia Transfer of Care Note    Patient: Naty St    Procedure(s) Performed: Procedure(s) (LRB):  COLONOSCOPY (N/A)    Patient location: PACU    Anesthesia Type: general    Transport from OR: Transported from OR on room air with adequate spontaneous ventilation    Post pain: adequate analgesia    Post assessment: no apparent anesthetic complications    Post vital signs: stable    Level of consciousness: awake and alert    Nausea/Vomiting: no nausea/vomiting    Complications: none    Transfer of care protocol was followed      Last vitals:   Visit Vitals  BP (!) 159/74 (BP Location: Right arm, Patient Position: Lying)   Pulse 80   Temp 36.5 °C (97.7 °F) (Temporal)   Resp 16   Ht 5' 6" (1.676 m)   Wt 59 kg (130 lb)   LMP  (LMP Unknown)   SpO2 100%   Breastfeeding? No   BMI 20.98 kg/m²     "

## 2018-11-07 NOTE — ANESTHESIA PREPROCEDURE EVALUATION
"                                                                                                             11/07/2018  Naty St is a 73 y.o., female with a pre-operative diagnosis of Iron deficiency anemia due to chronic blood loss [D50.0] who is scheduled for Procedure(s) (LRB):  COLONOSCOPY (N/A).     Has hx of pancreatic cancer, breast cancer and meningioma.  Now being worked up for lung cancer.  On eliquis for DVT- held for 2 days.    Review of patient's allergies indicates:   Allergen Reactions    Tobradex [tobramycin-dexamethasone] Swelling    Iodinated contrast- oral and iv dye Hives    Morphine Other (See Comments)     "shaking" and tremors    Latex Rash    Phenytoin sodium extended Other (See Comments) and Rash       Medical History:   Past Medical History:   Diagnosis Date    Anticoagulant long-term use     Blood clot in vein 06/2016    Breast cancer 1994    Cataract     Hypertension     Pancreatic cancer 1998    Posterior capsular opacification, left eye     Psychiatric problem     Seizures 01/25/2016    Stroke     Therapy      .    Pre-op Assessment    I have reviewed the Patient Summary Reports.     I have reviewed the Nursing Notes.   I have reviewed the Medications.     Review of Systems  Anesthesia Hx:  No problems with previous Anesthesia  Denies Family Hx of Anesthesia complications.   Denies Personal Hx of Anesthesia complications.   Cardiovascular:   Hypertension, well controlled    Renal/:   Chronic Renal Disease    Neurological:   CVA Neuromuscular Disease, Seizures    Psych:   Psychiatric History          Physical Exam  General:  Well nourished    Airway/Jaw/Neck:  Airway Findings: Mouth Opening: Normal Tongue: Normal  General Airway Assessment: Adult  Mallampati: II  TM Distance: Normal, at least 6 cm  Jaw/Neck Findings:  Neck ROM: Normal ROM      Dental:  Dental Findings: In tact   Chest/Lungs:  Chest/Lungs Findings: Clear to auscultation, Normal Respiratory " Rate     Heart/Vascular:  Heart Findings: Rate: Normal  Rhythm: Regular Rhythm  Sounds: Normal        Mental Status:  Mental Status Findings:  Cooperative, Alert and Oriented         Anesthesia Plan  Type of Anesthesia, risks & benefits discussed:  Anesthesia Type:  general  Patient's Preference: as indicated.  Intra-op Monitoring Plan: standard ASA monitors  Intra-op Monitoring Plan Comments:   Post Op Pain Control Plan:   Post Op Pain Control Plan Comments:   Induction:   IV  Beta Blocker:  Patient is not currently on a Beta-Blocker (No further documentation required).       Informed Consent: Patient understands risks and agrees with Anesthesia plan.  Questions answered. Anesthesia consent signed with patient.  ASA Score: 3     Day of Surgery Review of History & Physical:    H&P update referred to the provider.     Anesthesia Plan Notes:          Ready For Surgery From Anesthesia Perspective.

## 2018-11-07 NOTE — ANESTHESIA POSTPROCEDURE EVALUATION
"Anesthesia Post Evaluation    Patient: Naty St    Procedure(s) Performed: Procedure(s) (LRB):  COLONOSCOPY (N/A)    Final Anesthesia Type: general  Patient location during evaluation: GI PACU  Patient participation: Yes- Able to Participate  Level of consciousness: awake and alert  Post-procedure vital signs: reviewed and stable  Pain management: adequate  Airway patency: patent  PONV status at discharge: No PONV  Anesthetic complications: no      Cardiovascular status: blood pressure returned to baseline  Respiratory status: spontaneous ventilation  Hydration status: euvolemic  Follow-up not needed.        Visit Vitals  /63   Pulse 79   Temp 36 °C (96.8 °F)   Resp (!) 22   Ht 5' 6" (1.676 m)   Wt 59 kg (130 lb)   LMP  (LMP Unknown)   SpO2 99%   Breastfeeding? No   BMI 20.98 kg/m²       Pain/Melissa Score: Pain Assessment Performed: Yes (11/7/2018 11:03 AM)  Presence of Pain: denies (11/7/2018 12:59 PM)  Melissa Score: 10 (11/7/2018  1:05 PM)        "

## 2018-11-07 NOTE — [employer] PATIENT INSTRUCTIONS
Discharge Summary/Instructions after an Endoscopic Procedure  Patient Name: Naty St  Patient MRN: [id number]  Patient YOB: 1945 Wednesday, November 07, 2018  Jim Raman MD  RESTRICTIONS:  During your procedure today, you received medications for sedation.  These   medications may affect your judgment, balance and coordination.  Therefore,   for 24 hours, you have the following restrictions:   - DO NOT drive a car, operate machinery, make legal/financial decisions,   sign important papers or drink alcohol.    ACTIVITY:  Today: no heavy lifting, straining or running due to procedural   sedation/anesthesia.  The following day: return to full activity including work.  DIET:  Eat and drink normally unless instructed otherwise.     TREATMENT FOR COMMON SIDE EFFECTS:  - Mild abdominal pain, nausea, belching, bloating or excessive gas:  rest,   eat lightly and use a heating pad.  - Sore Throat: treat with throat lozenges and/or gargle with warm salt   water.  - Because air was used during the procedure, expelling large amounts of air   from your rectum or belching is normal.  - If a bowel prep was taken, you may not have a bowel movement for 1-3 days.    This is normal.  SYMPTOMS TO WATCH FOR AND REPORT TO YOUR PHYSICIAN:  1. Abdominal pain or bloating, other than gas cramps.  2. Chest pain.  3. Back pain.  4. Signs of infection such as: chills or fever occurring within 24 hours   after the procedure.  5. Rectal bleeding, which would show as bright red, maroon, or black stools.   (A tablespoon of blood from the rectum is not serious, especially if   hemorrhoids are present.)  6. Vomiting.  7. Weakness or dizziness.  GO DIRECTLY TO THE NEAREST EMERGENCY ROOM IF YOU HAVE ANY OF THE FOLLOWING:      Difficulty breathing              Chills and/or fever over 101 F   Persistent vomiting and/or vomiting blood   Severe abdominal pain   Severe chest pain   Black, tarry stools   Bleeding- more than one  tablespoon   Any other symptom or condition that you feel may need urgent attention  Your doctor recommends these additional instructions:  If any biopsies were taken, your doctors clinic will contact you in 1 to 2   weeks with any results.  - Discharge patient to home.   - Patient has a contact number available for emergencies.  The signs and   symptoms of potential delayed complications were discussed with the   patient.  Return to normal activities tomorrow.  Written discharge   instructions were provided to the patient.   - Resume previous diet.   - Continue present medications.   - Repeat colonoscopy at appointment to be scheduled because the bowel   preparation was poor.   - Return to referring physician.   For questions, problems or results please call your physician - Jim Raman MD at Work:  [phone].  [facility], EMERGENCY ROOM PHONE NUMBER: [phone]  IF A COMPLICATION OR EMERGENCY SITUATION ARISES AND YOU ARE UNABLE TO REACH   YOUR PHYSICIAN - GO DIRECTLY TO THE EMERGENCY ROOM.  Jim Raman MD  11/7/2018 12:28:23 PM  This report has been verified and signed electronically.  [employer]

## 2018-11-07 NOTE — H&P
Short Stay Endoscopy History and Physical    PCP - Olvin Mars MD  Referring Physician - Karrie Vazquez MD  [address] [place] [address]    Procedure - Colonoscopy  ASA - per anesthesia  Mallampati - per anesthesia  History of Anesthesia problems - no  Family history Anesthesia problems -  no   Plan of anesthesia - General    HPI  73 y.o. female     Reason for procedure: personal history of polyps      ROS:  Constitutional: No fevers, chills, No weight loss  CV: No chest pain  Pulm: No cough, No shortness of breath  GI: see HPI    Medical History:  has a past medical history of Anticoagulant long-term use, Blood clot in vein (06/2016), Breast cancer (1994), Cataract, Hypertension, Pancreatic cancer (1998), Posterior capsular opacification, left eye, Psychiatric problem, Seizures (01/25/2016), Stroke, and Therapy.    Surgical History:  has a past surgical history that includes Kidney stone surgery (2010--laser); left eswl (6/20/12); cysto and right ureteral stent (4/27/12); Oophorectomy (4/25/2012); Breast lumpectomy (1998); ecoli (2010); Whipple procedure w/ laparoscopy (1998); Cataract extraction (Bilateral, 2004); Hysterectomy (1974); Cholecystectomy (1998); BONE BIOSPY (3/9/16); Brain surgery (1/12/16); ESOPHAGOGASTRODUODENOSCOPY (EGD) (N/A, 3/14/2018); ENDOBRONCHIAL ULTRASOUND (EBUS) (N/A, 12/5/2017); BRONCHOSCOPY (N/A, 12/5/2017); XSDBXI-AYDVMG-CL (N/A, 3/9/2016); CRANIOTOMY WITH STEALTH (Left, 1/12/2016); COLONOSCOPY (N/A, 11/13/2015); and COLONOSCOPY (N/A, 9/26/2013).    Family History: family history includes Breast cancer in her mother; Leukemia in her paternal grandfather; Lung cancer in her mother; Stomach cancer in her paternal grandmother.. Otherwise no colon cancer, inflammatory bowel disease, or GI malignancies.    Social History:  reports that she quit smoking about 57 years ago. She smoked 0.00 packs per day for 0.00 years. she has never used smokeless tobacco. She reports  that she does not drink alcohol or use drugs.    Review of patient's allergies indicates:   Allergen Reactions    Tobradex [tobramycin-dexamethasone] Swelling    Iodinated contrast- oral and iv dye Hives    Latex Rash    Phenytoin sodium extended Other (See Comments) and Rash       Medications:   Medications Prior to Admission   Medication Sig Dispense Refill Last Dose    amitriptyline (ELAVIL) 50 MG tablet TAKE ONE TABLET BY MOUTH EVERY EVENING 30 tablet 3 Past Week at Unknown time    amLODIPine (NORVASC) 5 MG tablet Take 1 tablet (5 mg total) by mouth once daily. 30 tablet 6 11/7/2018 at Unknown time    apixaban (ELIQUIS) 5 mg Tab TAKE 1 TABLET BY MOUTH TWO TIMES A DAY 60 tablet 6 Past Week at Unknown time    atorvastatin (LIPITOR) 40 MG tablet TAKE ONE TABLET BY MOUTH ONCE DAILY 90 tablet 3 Past Week at Unknown time    clindamycin phosphate 1% (CLINDAGEL) 1 % gel APPLY TOPICALLY TWICE DAILY 60 g 6 Past Week at Unknown time    CREON CpDR TAKE ONE CAPSULE BY MOUTH THREE TIMES A DAY WITH MEALS 270 capsule 3 Past Week at Unknown time    dronabinol (MARINOL) 5 MG capsule Take 1 capsule (5 mg total) by mouth 2 (two) times daily before meals. 60 capsule 3 Past Week at Unknown time    levETIRAcetam (KEPPRA) 500 MG Tab Take 1 tablet (500 mg total) by mouth 2 (two) times daily. 180 tablet 1 Past Week at Unknown time    LORazepam (ATIVAN) 1 MG tablet TAKE ONE TABLET BY MOUTH TWICE DAILY 60 tablet 2 Past Week at Unknown time    losartan (COZAAR) 50 MG tablet Take 1 tablet (50 mg total) by mouth 2 (two) times daily. 60 tablet 10 11/7/2018 at Unknown time    metoprolol succinate (TOPROL-XL) 50 MG 24 hr tablet Take 1 tablet (50 mg total) by mouth once daily.   11/7/2018 at Unknown time    multivitamin (THERAGRAN) per tablet Take 1 tablet by mouth once daily.   Past Week at Unknown time    NYSTATIN (DUKE'S SOLUTION) Take 10 mLs by mouth 4 (four) times daily as needed (mouth sores/pain). 240 mL 0 Past Week at  Unknown time    TARCEVA 150 mg tablet TAKE 1 TABLET BY MOUTH EVERY DAY 30 tablet 1 11/7/2018 at Unknown time    denosumab (XGEVA) 120 mg/1.7 mL (70 mg/mL) Soln Inject 120 mg into the skin every 28 days.   Taking    mirabegron 50 mg Tb24 Take 1 tablet (50 mg total) by mouth once daily. 30 tablet 11 Taking    ondansetron (ZOFRAN) 4 MG tablet Take 1 tablet (4 mg total) by mouth every 6 (six) hours as needed for Nausea. 12 tablet 0 Unknown at Unknown time    senna-docusate 8.6-50 mg (SENNA LAXATIVE-STOOL SOFTENER) 8.6-50 mg per tablet Take 1 tablet by mouth once daily.   Taking    traMADol (ULTRAM) 50 mg tablet Take 1 tablet (50 mg total) by mouth every 6 (six) hours as needed for Pain. 30 tablet 0 Unknown at Unknown time       Physical Exam:    Vital Signs:   Vitals:    11/07/18 1057   BP: (!) 159/74   Pulse: 80   Resp: 16   Temp: 97.7 °F (36.5 °C)       General Appearance: Well appearing in no acute distress  Abdomen: Soft, non tender, non distended with normal bowel sounds, no masses    Labs:  Lab Results   Component Value Date    WBC 5.01 10/22/2018    HGB 11.5 (L) 10/22/2018    HCT 37.2 10/22/2018     10/22/2018    CHOL 172 10/08/2017    TRIG 63 10/08/2017    HDL 76 (H) 10/08/2017     (H) 10/22/2018    AST 51 (H) 10/22/2018     10/22/2018    K 3.9 10/22/2018     10/22/2018    CREATININE 1.3 10/22/2018    BUN 17 10/22/2018    CO2 26 10/22/2018    TSH 2.020 06/06/2018    INR 1.1 09/22/2018    HGBA1C 4.2 09/22/2018       I have explained the risks and benefits of this endoscopic procedure to the patient including but not limited to bleeding, inflammation, infection, perforation, and death.      Jim Raman MD

## 2018-11-12 ENCOUNTER — IMMUNIZATION (OUTPATIENT)
Dept: PHARMACY | Facility: CLINIC | Age: 73
End: 2018-11-12
Payer: MEDICARE

## 2018-11-12 ENCOUNTER — HOSPITAL ENCOUNTER (OUTPATIENT)
Dept: RADIOLOGY | Facility: HOSPITAL | Age: 73
Discharge: HOME | End: 2018-11-12
Attending: INTERNAL MEDICINE
Payer: MEDICARE

## 2018-11-12 DIAGNOSIS — C34.31 MALIGNANT NEOPLASM OF LOWER LOBE OF RIGHT LUNG: ICD-10-CM

## 2018-11-12 LAB — POCT GLUCOSE: 77 MG/DL (ref 70–110)

## 2018-11-12 PROCEDURE — 78815 PET IMAGE W/CT SKULL-THIGH: CPT | Mod: TC,PS

## 2018-11-12 PROCEDURE — A9552 F18 FDG: HCPCS

## 2018-11-12 PROCEDURE — 78815 PET IMAGE W/CT SKULL-THIGH: CPT | Mod: 26,PS,, | Performed by: RADIOLOGY

## 2018-11-13 ENCOUNTER — TELEPHONE (OUTPATIENT)
Dept: HEMATOLOGY/ONCOLOGY | Facility: CLINIC | Age: 73
End: 2018-11-13

## 2018-11-13 DIAGNOSIS — N32.81 OAB (OVERACTIVE BLADDER): ICD-10-CM

## 2018-11-13 RX ORDER — MIRABEGRON 50 MG/1
TABLET, FILM COATED, EXTENDED RELEASE ORAL
Qty: 30 TABLET | Refills: 11 | Status: SHIPPED | OUTPATIENT
Start: 2018-11-13 | End: 2019-12-06 | Stop reason: SDUPTHER

## 2018-11-13 NOTE — TELEPHONE ENCOUNTER
----- Message from Penny Cohen sent at 11/13/2018 12:20 PM CST -----  Contact: Pt  Pt son called to speak with nurse wen have some questions   Callback#[phone]  Thank You  LOLLY Cohen

## 2018-11-13 NOTE — TELEPHONE ENCOUNTER
----- Message from Radha Snow sent at 11/13/2018  1:02 PM CST -----  Contact: Basil (son)  Zandra,    Son is returning your call    Contact number [phone]  Thanks

## 2018-11-15 ENCOUNTER — OFFICE VISIT (OUTPATIENT)
Dept: CARDIOLOGY | Facility: CLINIC | Age: 73
End: 2018-11-15
Payer: MEDICARE

## 2018-11-15 ENCOUNTER — HOSPITAL ENCOUNTER (OUTPATIENT)
Dept: RADIOLOGY | Facility: HOSPITAL | Age: 73
Discharge: HOME | End: 2018-11-15
Attending: INTERNAL MEDICINE
Payer: MEDICARE

## 2018-11-15 VITALS
BODY MASS INDEX: 22.81 KG/M2 | WEIGHT: 136.88 LBS | SYSTOLIC BLOOD PRESSURE: 154 MMHG | HEIGHT: 65 IN | DIASTOLIC BLOOD PRESSURE: 74 MMHG | HEART RATE: 70 BPM

## 2018-11-15 DIAGNOSIS — E78.5 DYSLIPIDEMIA: ICD-10-CM

## 2018-11-15 DIAGNOSIS — C79.31 BRAIN METASTASES: ICD-10-CM

## 2018-11-15 DIAGNOSIS — I10 ESSENTIAL HYPERTENSION: ICD-10-CM

## 2018-11-15 DIAGNOSIS — N18.30 CKD (CHRONIC KIDNEY DISEASE) STAGE 3, GFR 30-59 ML/MIN: ICD-10-CM

## 2018-11-15 DIAGNOSIS — I42.0 DILATED CARDIOMYOPATHY: Primary | ICD-10-CM

## 2018-11-15 PROCEDURE — 70553 MRI BRAIN STEM W/O & W/DYE: CPT | Mod: 26,,, | Performed by: RADIOLOGY

## 2018-11-15 PROCEDURE — 99499 UNLISTED E&M SERVICE: CPT | Mod: HCNC,S$GLB,, | Performed by: INTERNAL MEDICINE

## 2018-11-15 PROCEDURE — 3077F SYST BP >= 140 MM HG: CPT | Mod: CPTII,S$GLB,, | Performed by: INTERNAL MEDICINE

## 2018-11-15 PROCEDURE — 1101F PT FALLS ASSESS-DOCD LE1/YR: CPT | Mod: CPTII,S$GLB,, | Performed by: INTERNAL MEDICINE

## 2018-11-15 PROCEDURE — 3078F DIAST BP <80 MM HG: CPT | Mod: CPTII,S$GLB,, | Performed by: INTERNAL MEDICINE

## 2018-11-15 PROCEDURE — 70553 MRI BRAIN STEM W/O & W/DYE: CPT | Mod: TC

## 2018-11-15 PROCEDURE — 99214 OFFICE O/P EST MOD 30 MIN: CPT | Mod: S$GLB,,, | Performed by: INTERNAL MEDICINE

## 2018-11-15 PROCEDURE — 25500020 PHARM REV CODE 255: Performed by: INTERNAL MEDICINE

## 2018-11-15 PROCEDURE — 99999 PR PBB SHADOW E&M-EST. PATIENT-LVL IV: CPT | Mod: PBBFAC,,, | Performed by: INTERNAL MEDICINE

## 2018-11-15 PROCEDURE — A9585 GADOBUTROL INJECTION: HCPCS | Performed by: INTERNAL MEDICINE

## 2018-11-15 RX ORDER — GADOBUTROL 604.72 MG/ML
6 INJECTION INTRAVENOUS
Status: COMPLETED | OUTPATIENT
Start: 2018-11-15 | End: 2018-11-15

## 2018-11-15 RX ADMIN — GADOBUTROL 6 ML: 604.72 INJECTION INTRAVENOUS at 01:11

## 2018-11-15 NOTE — PROGRESS NOTES
Subjective:   Patient ID:  Naty St is a 73 y.o. female who presents for follow-up of Cardiomyopathy      HPI:   Naty St presents for follow up of non-ischemic cardiomyopathy and hypertension. She is still being actively treated for lung CA. Last echo with EF low normal, diastolic dysfunction, and mild-moderate pulmonary hypertension. Had a negative stress test a few months ago. Hospitalized with [facility]. Naty St denies chest pain, shortness of breath, palpitations, presyncope , or syncope. She is at limited activity and is in a wheelchair but gets around at home. BP elevated but she states it is in the 130s/ at home. On a DOAC for prior DVT. Naty St has dyslipidemia  on high intensity statin but lasst LFTs elevated but improved from hospitalization.  Review of Systems   Constitution: Positive for weakness. Negative for malaise/fatigue, weight gain and weight loss.   Eyes: Negative for blurred vision.   Cardiovascular: Negative for chest pain, claudication, cyanosis, dyspnea on exertion, irregular heartbeat, leg swelling, near-syncope, orthopnea, palpitations, paroxysmal nocturnal dyspnea and syncope.   Respiratory: Negative for cough, shortness of breath and wheezing.    Musculoskeletal: Negative for falls and myalgias.   Gastrointestinal: Negative for abdominal pain, heartburn, nausea and vomiting.   Genitourinary: Negative for nocturia.   Neurological: Positive for loss of balance. Negative for brief paralysis, dizziness, focal weakness, headaches, numbness and paresthesias.        CVA in October 2017     Psychiatric/Behavioral: Negative for altered mental status.       Current Outpatient Medications   Medication Sig    amitriptyline (ELAVIL) 50 MG tablet TAKE ONE TABLET BY MOUTH EVERY EVENING    amLODIPine (NORVASC) 5 MG tablet Take 1 tablet (5 mg total) by mouth once daily.    apixaban (ELIQUIS) 5 mg Tab TAKE 1 TABLET BY MOUTH TWO TIMES A DAY     "atorvastatin (LIPITOR) 40 MG tablet TAKE ONE TABLET BY MOUTH ONCE DAILY    clindamycin phosphate 1% (CLINDAGEL) 1 % gel APPLY TOPICALLY TWICE DAILY    CREON CpDR TAKE ONE CAPSULE BY MOUTH THREE TIMES A DAY WITH MEALS    denosumab (XGEVA) 120 mg/1.7 mL (70 mg/mL) Soln Inject 120 mg into the skin every 28 days.    dronabinol (MARINOL) 5 MG capsule Take 1 capsule (5 mg total) by mouth 2 (two) times daily before meals.    levETIRAcetam (KEPPRA) 500 MG Tab Take 1 tablet (500 mg total) by mouth 2 (two) times daily.    LORazepam (ATIVAN) 1 MG tablet TAKE ONE TABLET BY MOUTH TWICE DAILY    losartan (COZAAR) 50 MG tablet Take 1 tablet (50 mg total) by mouth 2 (two) times daily.    metoprolol succinate (TOPROL-XL) 50 MG 24 hr tablet Take 1 tablet (50 mg total) by mouth once daily.    multivitamin (THERAGRAN) per tablet Take 1 tablet by mouth once daily.    MYRBETRIQ 50 mg Tb24 TAKE ONE TABLET BY MOUTH ONCE DAILY    senna-docusate 8.6-50 mg (SENNA LAXATIVE-STOOL SOFTENER) 8.6-50 mg per tablet Take 1 tablet by mouth once daily.    TARCEVA 150 mg tablet TAKE 1 TABLET BY MOUTH EVERY DAY    traMADol (ULTRAM) 50 mg tablet Take 1 tablet (50 mg total) by mouth every 6 (six) hours as needed for Pain.     No current facility-administered medications for this visit.      Facility-Administered Medications Ordered in Other Visits   Medication    denosumab (XGEVA) solution 120 mg     Objective:   Physical Exam   Constitutional: She is oriented to person, place, and time. She appears well-developed. No distress.   BP (!) 154/74 (BP Location: Right arm, Patient Position: Sitting, BP Method: Large (Automatic))   Pulse 70   Ht 5' 5" (1.651 m)   Wt 62.1 kg (136 lb 14.5 oz)   LMP  (LMP Unknown)   BMI 22.78 kg/m²    HENT:   Head: Normocephalic.   Eyes: Conjunctivae are normal. Pupils are equal, round, and reactive to light. No scleral icterus.   Neck: Neck supple. No JVD present. No thyromegaly present.   Cardiovascular: Normal " rate, regular rhythm, normal heart sounds and intact distal pulses. PMI is not displaced. Exam reveals no gallop and no friction rub.   No murmur heard.  No JVD    Pulmonary/Chest: Effort normal and breath sounds normal. No respiratory distress. She has no wheezes. She has no rales.   Abdominal: Soft. She exhibits no distension. There is no splenomegaly or hepatomegaly. There is no tenderness.   Musculoskeletal: She exhibits no edema or tenderness.   In a wheelchair but can ambulate.   Neurological: She is alert and oriented to person, place, and time.   Skin: Skin is warm and dry. She is not diaphoretic.   Psychiatric: She has a normal mood and affect. Her behavior is normal.       Lab Results   Component Value Date     10/22/2018    K 3.9 10/22/2018     10/22/2018    CO2 26 10/22/2018    BUN 17 10/22/2018    CREATININE 1.3 10/22/2018    GLU 92 10/22/2018    HGBA1C 4.2 09/22/2018    MG 2.0 09/24/2018    AST 51 (H) 10/22/2018     (H) 10/22/2018    ALBUMIN 2.6 (L) 10/22/2018    PROT 5.5 (L) 10/22/2018    BILITOT 1.2 (H) 10/22/2018    WBC 5.01 10/22/2018    HGB 11.5 (L) 10/22/2018    HCT 37.2 10/22/2018    HCT 25 (L) 01/12/2016    MCV 95 10/22/2018     10/22/2018    TSH 2.020 06/06/2018    CHOL 172 10/08/2017    HDL 76 (H) 10/08/2017    LDLCALC 83.4 10/08/2017    TRIG 63 10/08/2017       Assessment:     1. Dilated cardiomyopathy : EF low normal with diastolic dysfunction-compensated   2. Essential hypertension : Elevation this visit but 130s/ at home   3. Dyslipidemia :  on high intensity statin   4. CKD (chronic kidney disease) stage 3, GFR 30-59 ml/min        Plan:     Naty was seen today for cardiomyopathy.    Diagnoses and all orders for this visit:    Dilated cardiomyopathy  Continue current regimen    Essential hypertension  Continue current regimen    Dyslipidemia  Continue current regimen  If LFTs remain > 3 times normal may need to stop.  CKD (chronic kidney disease) stage 3, GFR  30-59 ml/min

## 2018-11-19 ENCOUNTER — OFFICE VISIT (OUTPATIENT)
Dept: HEMATOLOGY/ONCOLOGY | Facility: CLINIC | Age: 73
End: 2018-11-19
Payer: MEDICARE

## 2018-11-19 ENCOUNTER — INFUSION (OUTPATIENT)
Dept: INFUSION THERAPY | Facility: HOSPITAL | Age: 73
End: 2018-11-19
Attending: INTERNAL MEDICINE
Payer: MEDICARE

## 2018-11-19 VITALS
HEART RATE: 61 BPM | RESPIRATION RATE: 18 BRPM | SYSTOLIC BLOOD PRESSURE: 154 MMHG | WEIGHT: 138.44 LBS | OXYGEN SATURATION: 99 % | DIASTOLIC BLOOD PRESSURE: 69 MMHG | TEMPERATURE: 98 F | BODY MASS INDEX: 22.25 KG/M2 | HEIGHT: 66 IN

## 2018-11-19 DIAGNOSIS — C79.31 BRAIN METASTASES: ICD-10-CM

## 2018-11-19 DIAGNOSIS — C34.31 MALIGNANT NEOPLASM OF LOWER LOBE OF RIGHT LUNG: Primary | ICD-10-CM

## 2018-11-19 DIAGNOSIS — C79.51 SECONDARY CANCER OF BONE: ICD-10-CM

## 2018-11-19 PROCEDURE — 3078F DIAST BP <80 MM HG: CPT | Mod: CPTII,S$GLB,, | Performed by: INTERNAL MEDICINE

## 2018-11-19 PROCEDURE — 96372 THER/PROPH/DIAG INJ SC/IM: CPT

## 2018-11-19 PROCEDURE — 1101F PT FALLS ASSESS-DOCD LE1/YR: CPT | Mod: CPTII,S$GLB,, | Performed by: INTERNAL MEDICINE

## 2018-11-19 PROCEDURE — 63600175 PHARM REV CODE 636 W HCPCS: Mod: JG | Performed by: INTERNAL MEDICINE

## 2018-11-19 PROCEDURE — 99215 OFFICE O/P EST HI 40 MIN: CPT | Mod: S$GLB,,, | Performed by: INTERNAL MEDICINE

## 2018-11-19 PROCEDURE — 3077F SYST BP >= 140 MM HG: CPT | Mod: CPTII,S$GLB,, | Performed by: INTERNAL MEDICINE

## 2018-11-19 PROCEDURE — 99999 PR PBB SHADOW E&M-EST. PATIENT-LVL IV: CPT | Mod: PBBFAC,,, | Performed by: INTERNAL MEDICINE

## 2018-11-19 RX ADMIN — DENOSUMAB 120 MG: 120 INJECTION SUBCUTANEOUS at 11:11

## 2018-11-19 NOTE — PROGRESS NOTES
"Subjective:       Patient ID: Naty St is a 73 y.o. female.    Chief Complaint: Malignant neoplasm of lower lobe of right lung  Oncologic History:  Ms. Naty St was admitted to the hospital between 01/25/2016 and 01/28/2016. She has a history of pancreatic cancer 17 years ago, breast cancer and meningioma, presented to the hospital complaining of loss of consciousness. The patient apparently was in her normal state of health and she stood up to go to the bathroom and fell to the floor. The patient's daughter helped the mother up in the bathroom and noted that her mother's upper extremities were shaking and eye rolling. No reports of bowel or bladder incontinence, tongue biting or rolling. The second episode lasted about four minutes and she was extremely lethargic following that. Apparently, the patient had a meningioma resection done in the past; however, recently, underwent neurosurgical resection with Dr. 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    Additional immunohistochemical stains were performed which revealed the tumor cells to be are positive for TTF1 and negative for ER and GCDFP. The morphology and TTF1 positivity are most consistent with lung primary.    Of note, imaging scan at the end of January 2016 revealed a mass in the lung at 2 cm in the medial aspect of the apical segment of the right upper lobe abutting the mediastinum at the level of the azygous vein and abutting and possibly encasing the segmental bronchi and vessels of the apical segment of the right upper lobe and no pleural fluid was present. Also, there is an enlarged right paratracheal lymph node. No evidence of any metastatic disease at the pancreatic site with postoperative changes post Whipple disease  Her PET Scan from 2/15/16 reveal "Hypermetabolic mass in the right lung apex consistent with a " "primary malignancy. Hypermetabolic mediastinal lymph nodes consistent with metastatic disease. Right sacral hypermetabolic lesion consistent with metastatic disease, noting additional mildly sclerotic lesions in multiple vertebral bodies which do not demonstrate abnormal hypermetabolism  She underwent IR bone biopsy which revealed metastatic adenocarcinoma of lung origin. 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  She has completed focal RT to brain lesions in March 2016.    PET scan from 6/6/16 shows "Dramatic almost complete response to therapy."  Lovenox started after US lower ext 6/7/16 shows Acute complete occlusion of one of the left posterior tibial vein.Remote partial thrombus of the proximal left superficial femoral vein.  She is on Tarceva.   12/6/16 PET scan reveals "Stable right upper lobe lesion and right hilar lymph node. No new lesions identified. Trace left pleural effusion".  1/27/17 Renal u/s "Medical renal disease. Nonobstructive right nephrolithiasis"  1/31/17 PET - "Right upper lobe nodule and right hilar lymph node similar and very low grade activity.  There is no definite evidence of recurrence."   11/9/17 PET "In this patient with history of lung cancer, there is interval increase in size and hypermetabolism of a right upper lobe lung lesion concerning for recurrent disease. Additionally, there is interval appearance of a hypermetabolic paratracheal lymph node suspicious for metastatic disease"                 She progressed on Tarceva She underwent EBUS on 12/5/17. Pathology revealed adenocarcinoma but T790m could not be done as quantity was not insufficient. Her PET scan from 1/30/18 revealed The patient's previously identified abnormal lesions is slightly greater uptake of FDG on today's study compared with prior exam. These findings are concerning for progression of disease"      She was hospitalized between 4/9/18-4/16/18. Her hospital course was as follows "Patient was admitted to " "hemonc service on 4/9 with acute hypoxic respiratory failure. She was requiring 4 L of nc on admission. She was started on moxi for CAP. She received one dose of ceftriaxone in the ED. On 2nd day of admission she spiked a fever. Her Hb was ~7 g/dl so she was transfused 1 unit of PRBCs. Over night she developed worsening of her symptoms with increased O2 requirements to 15 L HFNC. Her CXR showed worsening congestion. There was a concern of TRALI vs TACO. New 2D echo with diastolic dysfunction. She was given IV lasix pushes as needed and was started on dexamethasone.  Her abx was escalated to vanc and cefepime. She improved with diuresis and steroids. Pulmonary were consulted. Her O2 requirements continued to improve. On 4/15 she was switched to Augmentin. PT recommended discharging her to SNF but the patient refused and she wanted to go home with home health. She qualified for home oxygen so she was discharged on 3 L nc while at rest and 6 while active. Augmentin and dexamethasone were discontinued on discharge"     She was readmitted from 4/27 to 5/3/18 with who presented to the ED with a complaint of fatigue and worsening DELA CRUZ. Pt diagnosed PNA 4/9 and was discharged on 4/16 on 3L oxygen. She was initially placed on cefepime for 3 days followed by an add'l 2 days of Augmentin. Pulm was consulted with assistance as her oxygen requirements were increasing. She was placed on oral steroids, then trailed on 80mg TID solumedrol for 2 days and will be discharged on a prednisone taper. She was also diuresed with 2 days of 40mg IV lasix with appropriate UOP resulting, improvement on repeat CXR. She was medically stable and appropriate for DC home with home health on 1L continuous O2. She will be seen for close f/u and may be able to come off oxygen at that time.      She discontinued Tagrisso in April 2018. Restaging scans from 6/4/18 revealed "Stable appearance of a spiculated right upper lobe pulmonary lesion.  Additional " "irregular focus superior to the lesion in the right lung apex is stable and may represent scar or additional lesion; although this was not hypermetabolic on prior PET-CT.  No new pulmonary lesions. 1.3 cm subcarinal lymph node, not hypermetabolic on prior PET-CT. Stable postsurgical changes of a Whipple procedure. Bilateral nonobstructing nephrolithiasis.  Stable sclerotic focus in the T5 vertebral body, possibly a bone island as this was not hypermetabolic on prior PET-CT. Small hiatal hernia. RECIST SUMMARY: Date of prior examination for comparison 01/30/2018 Lesion 1: Right upper lobe 1.3 cm Series 2 image 31 prior measurement 1.3 cm"  Bone scan also from 6/4/18 revealed no evidence of mets.     Her PET scan from 7/11/18 revealed "Right suprahilar lesion SUV max 3.76, previously 6.86. Pretracheal lymph node SUV max 2.55, previously 3.79. There is physiologic intracranial, head, and neck activity.  There is muscle activation.  There is vocal cord activation.  There is physiologic liver, spleen, GI and  activity.  There is a hiatal hernia.  Pelvic organs show nothing unusual.  No bone lesions are seen.  There are areas of benign appearing lung scar"  She notes fatigue.  She denies any nausea, vomiting, diarrhea, constipation, abdominal pain, weight loss or loss of appetite, chest pain, shortness of breath, leg swelling, fatigue, pain, headache, dizziness, or mood changes. Her ECOG PS is 2. She is accompanied by her  and son     She has been on Tarceva since 6/5/18     HPI She comes to review labs on Tarceva  PET scan reveals On today's study the patient has no new lesions show increased FDG avidity suggesting progression of disease in these regions.  MRI brain reveals Status post bifrontal craniotomy for left frontal mass resection without evidence of residual or recurrent malignancy.  She feels well and denies any new issues    Review of Systems   Constitutional: Negative for appetite change, fatigue and " unexpected weight change.   HENT: Negative for mouth sores.    Eyes: Negative for visual disturbance.   Respiratory: Negative for cough and shortness of breath.    Cardiovascular: Negative for chest pain.   Gastrointestinal: Negative for abdominal pain and diarrhea.   Genitourinary: Negative for frequency.   Musculoskeletal: Negative for back pain.   Skin: Negative for rash.   Neurological: Negative for headaches.   Hematological: Negative for adenopathy.   Psychiatric/Behavioral: The patient is not nervous/anxious.    All other systems reviewed and are negative.      Objective:      Physical Exam   Constitutional: She is oriented to person, place, and time. She appears well-developed and well-nourished.   HENT:   Mouth/Throat: No oropharyngeal exudate.   Cardiovascular: Normal rate and normal heart sounds.   Pulmonary/Chest: Effort normal and breath sounds normal. She has no wheezes.   Abdominal: Soft. Bowel sounds are normal. There is no tenderness.   Musculoskeletal: She exhibits no edema or tenderness.   Lymphadenopathy:     She has no cervical adenopathy.   Neurological: She is alert and oriented to person, place, and time. Coordination normal.   Skin: Skin is warm and dry. No rash noted.   Psychiatric: She has a normal mood and affect. Judgment and thought content normal.   Vitals reviewed.      LABS:  WBC   Date Value Ref Range Status   11/19/2018 4.75 3.90 - 12.70 K/uL Final     Hemoglobin   Date Value Ref Range Status   11/19/2018 11.1 (L) 12.0 - 16.0 g/dL Final     POC Hematocrit   Date Value Ref Range Status   01/12/2016 25 (L) 36 - 54 %PCV Final     Hematocrit   Date Value Ref Range Status   11/19/2018 37.1 37.0 - 48.5 % Final     Platelets   Date Value Ref Range Status   11/19/2018 188 150 - 350 K/uL Final     Gran # (ANC)   Date Value Ref Range Status   11/19/2018 2.5 1.8 - 7.7 K/uL Final     Comment:     The ANC is based on a white cell differential from an   automated cell counter. It has not been  microscopically   reviewed for the presence of abnormal cells. Clinical   correlation is required.         Chemistry        Component Value Date/Time     11/19/2018 0915    K 4.3 11/19/2018 0915     (H) 11/19/2018 0915    CO2 23 11/19/2018 0915    BUN 28 (H) 11/19/2018 0915    CREATININE 1.5 (H) 11/19/2018 0915    GLU 79 11/19/2018 0915        Component Value Date/Time    CALCIUM 9.1 11/19/2018 0915    ALKPHOS 82 11/19/2018 0915    AST 24 11/19/2018 0915    ALT 21 11/19/2018 0915    BILITOT 1.2 (H) 11/19/2018 0915    ESTGFRAFRICA 39.3 (A) 11/19/2018 0915    EGFRNONAA 34.1 (A) 11/19/2018 0915           Assessment:       1. Malignant neoplasm of lower lobe of right lung    2. Secondary cancer of bone    3. Brain metastases        Plan:        1,2,3. She is doing well. PET scan and MRI continue to reveal an excellent response. She will continue on Tarceva and will return in 8 weeks with PET scans and also for Xgeva    Above care plan was discussed with patient and accompanying  and all questions were addressed to their satisfaction

## 2018-11-29 ENCOUNTER — TELEPHONE (OUTPATIENT)
Dept: HEMATOLOGY/ONCOLOGY | Facility: CLINIC | Age: 73
End: 2018-11-29

## 2018-11-29 DIAGNOSIS — L27.0 ACNEIFORM DRUG ERUPTION: Primary | ICD-10-CM

## 2018-11-29 RX ORDER — DOXYCYCLINE 100 MG/1
100 CAPSULE ORAL EVERY 12 HOURS
Qty: 30 CAPSULE | Refills: 0 | Status: SHIPPED | OUTPATIENT
Start: 2018-11-29 | End: 2019-01-29

## 2018-11-29 NOTE — TELEPHONE ENCOUNTER
"Spoke with patient.  She notes red bumpy raised pruritic rash on her face and legs only, since starting the erlotinib a couple months ago. She states this didn't happen as badly when she was on the brand name tarceva. Patient is not certain she was on brand name---but this is what she "is assuming." she has used clinda gel in the past---with no benefit. Cortisone cream OTC helped her in the past.   Nurse explained to patient this just may be her body reacting at a later time---and have nothing to do with generic/brand. Patient understands nurse will contact her back tomorrow.    Message routed to dr sullivan (want to start her on doxy?)  "

## 2018-11-29 NOTE — TELEPHONE ENCOUNTER
----- Message from Willie Ramirez sent at 11/29/2018  4:24 PM CST -----  Contact: Pt   Will like a call from Zandra in regards to contacting insurance company conerning a medication     Pt did not give the name of the medication     Contact::[phone]

## 2018-11-30 NOTE — TELEPHONE ENCOUNTER
Advised patient to start doxy twice a day--and to follow up with nurse on Monday about her rash.  Patient voiced understanding.

## 2018-12-10 RX ORDER — PANCRELIPASE 60000; 12000; 38000 [USP'U]/1; [USP'U]/1; [USP'U]/1
CAPSULE, DELAYED RELEASE PELLETS ORAL
Qty: 270 CAPSULE | Refills: 3 | Status: SHIPPED | OUTPATIENT
Start: 2018-12-10 | End: 2020-03-28 | Stop reason: SDUPTHER

## 2018-12-14 ENCOUNTER — OFFICE VISIT (OUTPATIENT)
Dept: OBSTETRICS AND GYNECOLOGY | Facility: CLINIC | Age: 73
End: 2018-12-14
Payer: MEDICARE

## 2018-12-14 VITALS
BODY MASS INDEX: 22.45 KG/M2 | WEIGHT: 139.69 LBS | DIASTOLIC BLOOD PRESSURE: 72 MMHG | HEIGHT: 66 IN | SYSTOLIC BLOOD PRESSURE: 108 MMHG

## 2018-12-14 DIAGNOSIS — Z90.710 S/P HYSTERECTOMY: ICD-10-CM

## 2018-12-14 DIAGNOSIS — N81.10 VAGINAL WALL PROLAPSE: Primary | ICD-10-CM

## 2018-12-14 PROCEDURE — 1101F PT FALLS ASSESS-DOCD LE1/YR: CPT | Mod: CPTII,S$GLB,, | Performed by: OBSTETRICS & GYNECOLOGY

## 2018-12-14 PROCEDURE — 99999 PR PBB SHADOW E&M-EST. PATIENT-LVL III: CPT | Mod: PBBFAC,,, | Performed by: OBSTETRICS & GYNECOLOGY

## 2018-12-14 PROCEDURE — 3078F DIAST BP <80 MM HG: CPT | Mod: CPTII,S$GLB,, | Performed by: OBSTETRICS & GYNECOLOGY

## 2018-12-14 PROCEDURE — 99203 OFFICE O/P NEW LOW 30 MIN: CPT | Mod: S$GLB,,, | Performed by: OBSTETRICS & GYNECOLOGY

## 2018-12-14 PROCEDURE — 3074F SYST BP LT 130 MM HG: CPT | Mod: CPTII,S$GLB,, | Performed by: OBSTETRICS & GYNECOLOGY

## 2018-12-14 RX ORDER — POLYETHYLENE GLYCOL 3350 17 G/17G
POWDER, FOR SOLUTION ORAL
Status: ON HOLD | COMMUNITY
Start: 2018-12-13 | End: 2021-01-01 | Stop reason: HOSPADM

## 2018-12-14 NOTE — PROGRESS NOTES
CC: vaginal wall prolapse    Naty St is a 73 y.o. female  presents for vaginal wall prolapse.  She denies any vaginal bleeding. Denies any problems urinating or splinting.   Not sexually active at this time.       Past Medical History:   Diagnosis Date    Anticoagulant long-term use     Blood clot in vein 2016    Breast cancer     Cataract     Hypertension     Lung cancer     Lung cancer metastatic to brain     Pancreatic cancer     Posterior capsular opacification, left eye     Psychiatric problem     Seizures 2016    Stroke     Therapy      Past Surgical History:   Procedure Laterality Date    [id number] N/A 3/9/2016    Performed by [facility] at [facility] OR 2ND [facility]    BONE BIOSPY  3/9/16    BRAIN SURGERY  16    tumor removal 16    BREAST LUMPECTOMY      left    BRONCHOSCOPY N/A 2017    Performed by Kiya Zhang MD at [facility] OR 2ND [facility]    CATARACT EXTRACTION Bilateral     yag OU    CHOLECYSTECTOMY      COLONOSCOPY N/A 2015    Procedure: COLONOSCOPY;  Surgeon: Alex Carmona MD;  Location: [facility] ([address] [place] [address]);  Service: Endoscopy;  Laterality: N/A;  2 year f/u    COLONOSCOPY N/A 2018    Procedure: COLONOSCOPY;  Surgeon: Jim Raman MD;  Location: [facility] ([address] [place] [address]);  Service: Endoscopy;  Laterality: N/A;  Eliquis - per Dr. Vazquez -ok to hold Eliquis x 2 days prior to colon- ERW    COLONOSCOPY N/A 2018    Performed by Jim Raman MD at [facility] (4TH [facility]LR)    COLONOSCOPY N/A 2015    Performed by Alex Carmona MD at [facility] (4TH [facility])    COLONOSCOPY N/A 2013    Performed by Gavin Prasad MD at [facility] (4TH [facility]LR)    CRANIOTOMY WITH STEALTH Left 2016    Performed by Salty [facility]rera [facility] at [facility] OR 2[facility]    cysto and right ureteral stent  12    ecoli      removal of blockage, done throat, then through the liver.    ENDOBRONCHIAL ULTRASOUND (EBUS)  "N/A 2017    Performed by [facility] at [facility] OR 2ND [facility]    ESOPHAGOGASTRODUODENOSCOPY (EGD) N/A 3/14/2018    Performed by Alex Carmona MD at [facility] ENDO (4TH FLR)    HYSTERECTOMY  1974    KIDNEY STONE SURGERY  --laser    left eswl  12    OOPHORECTOMY  2012    Laparoscopic BSO, lysis of adhesions, cystoscopy greater than 35mins     WHIPPLE PROCEDURE W/ LAPAROSCOPY       Family History   Problem Relation Age of Onset    Breast cancer Mother     Lung cancer Mother     Leukemia Paternal Grandfather     Stomach cancer Paternal Grandmother     Colon cancer Neg Hx     Ovarian cancer Neg Hx      Social History     Tobacco Use    Smoking status: Former Smoker     Packs/day: 0.00     Years: 0.00     Pack years: 0.00     Last attempt to quit: 1961     Years since quittin.2    Smokeless tobacco: Never Used   Substance Use Topics    Alcohol use: No    Drug use: No     OB History      Para Term  AB Living    4 4            SAB TAB Ectopic Multiple Live Births                       /72 (BP Location: Right arm, Patient Position: Sitting)   Ht 5' 6" (1.676 m)   Wt 63.3 kg (139 lb 10.6 oz)   LMP  (LMP Unknown)   BMI 22.54 kg/m²     ROS:  GENERAL: Denies weight gain or weight loss. Feeling well overall.   SKIN: Denies rash or lesions.   HEAD: Denies head injury or headache.   NODES: Denies enlarged lymph nodes.   CHEST: Denies chest pain or shortness of breath.   CARDIOVASCULAR: Denies palpitations or left sided chest pain.   ABDOMEN: No abdominal pain, constipation, diarrhea, nausea, vomiting or rectal bleeding.   URINARY: No frequency, dysuria, hematuria, or burning on urination.  REPRODUCTIVE: See HPI.   BREASTS: The patient performs breast self-examination and denies pain, lumps, or nipple discharge.   HEMATOLOGIC: No easy bruisability or excessive bleeding.   MUSCULOSKELETAL: Denies joint pain or swelling.   NEUROLOGIC: Denies syncope or weakness. "   PSYCHIATRIC: Denies depression, anxiety or mood swings.    PE:   APPEARANCE: Well nourished, well developed, in no acute distress.  AFFECT: WNL, alert and oriented x 3.  SKIN: No acne or hirsutism.  NECK: Neck symmetric without masses or thyromegaly.  NODES: No inguinal, cervical, axillary or femoral lymph node enlargement.  CHEST: Good respiratory effort.   ABDOMEN: Soft. No tenderness or masses. No hepatosplenomegaly. No hernias.  PELVIC: Normal external female genitalia without lesions. Normal hair distribution. Adequate perineal body, normal urethral meatus. Vagina atrophic without lesions or discharge. Significant cystocele to the introitus with pressure and  significant rectocele. Bimanual exam shows uterus and cervix to be surgically absent. Vaginal wall prolapse,   Adnexa without masses or tenderness.  RECTAL: Rectovaginal exam confirms poor sphincter tone, no masses.  EXTREMITIES: No edema.      ICD-10-CM ICD-9-CM    1. Vaginal wall prolapse N81.10 618.00    2. S/P hysterectomy Z90.710 V88.01          Recommend consult with UROGYN    Patient was counseled today on possible pessary placement vs surgical management  Risks and benefits reviewed

## 2018-12-17 DIAGNOSIS — C34.31 MALIGNANT NEOPLASM OF LOWER LOBE OF RIGHT LUNG: ICD-10-CM

## 2018-12-17 RX ORDER — ERLOTINIB HYDROCHLORIDE 150 MG/1
TABLET ORAL
Qty: 30 TABLET | Refills: 0 | Status: SHIPPED | OUTPATIENT
Start: 2018-12-17 | End: 2019-02-11 | Stop reason: SDUPTHER

## 2018-12-26 DIAGNOSIS — Z13.820 OSTEOPOROSIS SCREENING: Primary | ICD-10-CM

## 2018-12-26 DIAGNOSIS — Z78.0 ASYMPTOMATIC MENOPAUSAL STATE: ICD-10-CM

## 2019-01-14 ENCOUNTER — HOSPITAL ENCOUNTER (OUTPATIENT)
Dept: RADIOLOGY | Facility: HOSPITAL | Age: 74
Discharge: HOME | End: 2019-01-14
Attending: INTERNAL MEDICINE
Payer: MEDICARE

## 2019-01-14 DIAGNOSIS — C34.31 MALIGNANT NEOPLASM OF LOWER LOBE OF RIGHT LUNG: ICD-10-CM

## 2019-01-14 LAB — POCT GLUCOSE: 89 MG/DL (ref 70–110)

## 2019-01-14 PROCEDURE — 78815 PET IMAGE W/CT SKULL-THIGH: CPT | Mod: 26,PS,, | Performed by: RADIOLOGY

## 2019-01-14 PROCEDURE — 78815 PET IMAGE W/CT SKULL-THIGH: CPT | Mod: TC,PS

## 2019-01-14 PROCEDURE — 78815 NM PET CT ROUTINE: ICD-10-PCS | Mod: 26,PS,, | Performed by: RADIOLOGY

## 2019-01-14 PROCEDURE — A9552 F18 FDG: HCPCS

## 2019-01-16 ENCOUNTER — TELEPHONE (OUTPATIENT)
Dept: HEMATOLOGY/ONCOLOGY | Facility: CLINIC | Age: 74
End: 2019-01-16

## 2019-01-16 NOTE — TELEPHONE ENCOUNTER
spoke with pt on today in regards to changes to appointment times, pt is aware and has confirm new times.

## 2019-01-18 ENCOUNTER — LAB VISIT (OUTPATIENT)
Dept: LAB | Facility: HOSPITAL | Age: 74
End: 2019-01-18
Attending: INTERNAL MEDICINE
Payer: MEDICARE

## 2019-01-18 ENCOUNTER — INFUSION (OUTPATIENT)
Dept: INFUSION THERAPY | Facility: HOSPITAL | Age: 74
End: 2019-01-18
Attending: INTERNAL MEDICINE
Payer: MEDICARE

## 2019-01-18 ENCOUNTER — OFFICE VISIT (OUTPATIENT)
Dept: HEMATOLOGY/ONCOLOGY | Facility: CLINIC | Age: 74
End: 2019-01-18
Payer: MEDICARE

## 2019-01-18 VITALS
DIASTOLIC BLOOD PRESSURE: 62 MMHG | TEMPERATURE: 99 F | OXYGEN SATURATION: 98 % | BODY MASS INDEX: 22.6 KG/M2 | SYSTOLIC BLOOD PRESSURE: 135 MMHG | RESPIRATION RATE: 18 BRPM | HEIGHT: 66 IN | HEART RATE: 65 BPM | WEIGHT: 140.63 LBS

## 2019-01-18 DIAGNOSIS — C34.31 MALIGNANT NEOPLASM OF LOWER LOBE OF RIGHT LUNG: ICD-10-CM

## 2019-01-18 DIAGNOSIS — C79.31 BRAIN METASTASES: ICD-10-CM

## 2019-01-18 DIAGNOSIS — C34.31 MALIGNANT NEOPLASM OF LOWER LOBE OF RIGHT LUNG: Primary | ICD-10-CM

## 2019-01-18 DIAGNOSIS — C79.51 SECONDARY CANCER OF BONE: ICD-10-CM

## 2019-01-18 LAB
ALBUMIN SERPL BCP-MCNC: 2.7 G/DL
ALP SERPL-CCNC: 68 U/L
ALT SERPL W/O P-5'-P-CCNC: 21 U/L
ANION GAP SERPL CALC-SCNC: 6 MMOL/L
AST SERPL-CCNC: 28 U/L
BILIRUB SERPL-MCNC: 1.1 MG/DL
BUN SERPL-MCNC: 22 MG/DL
CALCIUM SERPL-MCNC: 8.5 MG/DL
CHLORIDE SERPL-SCNC: 117 MMOL/L
CO2 SERPL-SCNC: 19 MMOL/L
CREAT SERPL-MCNC: 1.5 MG/DL
ERYTHROCYTE [DISTWIDTH] IN BLOOD BY AUTOMATED COUNT: 13.5 %
EST. GFR  (AFRICAN AMERICAN): 39.3 ML/MIN/1.73 M^2
EST. GFR  (NON AFRICAN AMERICAN): 34.1 ML/MIN/1.73 M^2
GLUCOSE SERPL-MCNC: 84 MG/DL
HCT VFR BLD AUTO: 33.3 %
HGB BLD-MCNC: 10.5 G/DL
IMM GRANULOCYTES # BLD AUTO: 0.04 K/UL
MCH RBC QN AUTO: 29.4 PG
MCHC RBC AUTO-ENTMCNC: 31.5 G/DL
MCV RBC AUTO: 93 FL
NEUTROPHILS # BLD AUTO: 4 K/UL
PLATELET # BLD AUTO: 248 K/UL
PMV BLD AUTO: 9.8 FL
POTASSIUM SERPL-SCNC: 3.9 MMOL/L
PROT SERPL-MCNC: 5.7 G/DL
RBC # BLD AUTO: 3.57 M/UL
SODIUM SERPL-SCNC: 142 MMOL/L
WBC # BLD AUTO: 6.49 K/UL

## 2019-01-18 PROCEDURE — 80053 COMPREHEN METABOLIC PANEL: CPT

## 2019-01-18 PROCEDURE — 3075F SYST BP GE 130 - 139MM HG: CPT | Mod: CPTII,S$GLB,, | Performed by: INTERNAL MEDICINE

## 2019-01-18 PROCEDURE — 99499 RISK ADDL DX/OHS AUDIT: ICD-10-PCS | Mod: HCNC,S$GLB,, | Performed by: INTERNAL MEDICINE

## 2019-01-18 PROCEDURE — 1101F PT FALLS ASSESS-DOCD LE1/YR: CPT | Mod: CPTII,S$GLB,, | Performed by: INTERNAL MEDICINE

## 2019-01-18 PROCEDURE — 36415 COLL VENOUS BLD VENIPUNCTURE: CPT

## 2019-01-18 PROCEDURE — 99999 PR PBB SHADOW E&M-EST. PATIENT-LVL V: ICD-10-PCS | Mod: PBBFAC,,, | Performed by: INTERNAL MEDICINE

## 2019-01-18 PROCEDURE — 99215 PR OFFICE/OUTPT VISIT, EST, LEVL V, 40-54 MIN: ICD-10-PCS | Mod: S$GLB,,, | Performed by: INTERNAL MEDICINE

## 2019-01-18 PROCEDURE — 1101F PR PT FALLS ASSESS DOC 0-1 FALLS W/OUT INJ PAST YR: ICD-10-PCS | Mod: CPTII,S$GLB,, | Performed by: INTERNAL MEDICINE

## 2019-01-18 PROCEDURE — 3078F PR MOST RECENT DIASTOLIC BLOOD PRESSURE < 80 MM HG: ICD-10-PCS | Mod: CPTII,S$GLB,, | Performed by: INTERNAL MEDICINE

## 2019-01-18 PROCEDURE — 99499 UNLISTED E&M SERVICE: CPT | Mod: HCNC,S$GLB,, | Performed by: INTERNAL MEDICINE

## 2019-01-18 PROCEDURE — 99215 OFFICE O/P EST HI 40 MIN: CPT | Mod: S$GLB,,, | Performed by: INTERNAL MEDICINE

## 2019-01-18 PROCEDURE — 96372 THER/PROPH/DIAG INJ SC/IM: CPT

## 2019-01-18 PROCEDURE — 3078F DIAST BP <80 MM HG: CPT | Mod: CPTII,S$GLB,, | Performed by: INTERNAL MEDICINE

## 2019-01-18 PROCEDURE — 63600175 PHARM REV CODE 636 W HCPCS: Mod: JG | Performed by: INTERNAL MEDICINE

## 2019-01-18 PROCEDURE — 99999 PR PBB SHADOW E&M-EST. PATIENT-LVL V: CPT | Mod: PBBFAC,,, | Performed by: INTERNAL MEDICINE

## 2019-01-18 PROCEDURE — 85027 COMPLETE CBC AUTOMATED: CPT

## 2019-01-18 PROCEDURE — 3075F PR MOST RECENT SYSTOLIC BLOOD PRESS GE 130-139MM HG: ICD-10-PCS | Mod: CPTII,S$GLB,, | Performed by: INTERNAL MEDICINE

## 2019-01-18 RX ADMIN — DENOSUMAB 120 MG: 120 INJECTION SUBCUTANEOUS at 01:01

## 2019-01-18 NOTE — PROGRESS NOTES
"Subjective:       Patient ID: Naty St is a 73 y.o. female.    Chief Complaint: Malignant neoplasm of lower lobe of right lung  Oncologic History:  Ms. Naty St was admitted to the hospital between 01/25/2016 and 01/28/2016. She has a history of pancreatic cancer 17 years ago, breast cancer and meningioma, presented to the hospital complaining of loss of consciousness. The patient apparently was in her normal state of health and she stood up to go to the bathroom and fell to the floor. The patient's daughter helped the mother up in the bathroom and noted that her mother's upper extremities were shaking and eye rolling. No reports of bowel or bladder incontinence, tongue biting or rolling. The second episode lasted about four minutes and she was extremely lethargic following that. Apparently, the patient had a meningioma resection done in the past; however, recently, underwent neurosurgical resection with Dr. 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    Additional immunohistochemical stains were performed which revealed the tumor cells to be are positive for TTF1 and negative for ER and GCDFP. The morphology and TTF1 positivity are most consistent with lung primary.    Of note, imaging scan at the end of January 2016 revealed a mass in the lung at 2 cm in the medial aspect of the apical segment of the right upper lobe abutting the mediastinum at the level of the azygous vein and abutting and possibly encasing the segmental bronchi and vessels of the apical segment of the right upper lobe and no pleural fluid was present. Also, there is an enlarged right paratracheal lymph node. No evidence of any metastatic disease at the pancreatic site with postoperative changes post Whipple disease  Her PET Scan from 2/15/16 reveal "Hypermetabolic mass in the right lung apex consistent with a " "primary malignancy. Hypermetabolic mediastinal lymph nodes consistent with metastatic disease. Right sacral hypermetabolic lesion consistent with metastatic disease, noting additional mildly sclerotic lesions in multiple vertebral bodies which do not demonstrate abnormal hypermetabolism  She underwent IR bone biopsy which revealed metastatic adenocarcinoma of lung origin. 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  She has completed focal RT to brain lesions in March 2016.    PET scan from 6/6/16 shows "Dramatic almost complete response to therapy."  Lovenox started after US lower ext 6/7/16 shows Acute complete occlusion of one of the left posterior tibial vein.Remote partial thrombus of the proximal left superficial femoral vein.  She is on Tarceva.   12/6/16 PET scan reveals "Stable right upper lobe lesion and right hilar lymph node. No new lesions identified. Trace left pleural effusion".  1/27/17 Renal u/s "Medical renal disease. Nonobstructive right nephrolithiasis"  1/31/17 PET - "Right upper lobe nodule and right hilar lymph node similar and very low grade activity.  There is no definite evidence of recurrence."   11/9/17 PET "In this patient with history of lung cancer, there is interval increase in size and hypermetabolism of a right upper lobe lung lesion concerning for recurrent disease. Additionally, there is interval appearance of a hypermetabolic paratracheal lymph node suspicious for metastatic disease"                 She progressed on Tarceva She underwent EBUS on 12/5/17. Pathology revealed adenocarcinoma but T790m could not be done as quantity was not insufficient. Her PET scan from 1/30/18 revealed The patient's previously identified abnormal lesions is slightly greater uptake of FDG on today's study compared with prior exam. These findings are concerning for progression of disease"      She was hospitalized between 4/9/18-4/16/18. Her hospital course was as follows "Patient was admitted to " "hemonc service on 4/9 with acute hypoxic respiratory failure. She was requiring 4 L of nc on admission. She was started on moxi for CAP. She received one dose of ceftriaxone in the ED. On 2nd day of admission she spiked a fever. Her Hb was ~7 g/dl so she was transfused 1 unit of PRBCs. Over night she developed worsening of her symptoms with increased O2 requirements to 15 L HFNC. Her CXR showed worsening congestion. There was a concern of TRALI vs TACO. New 2D echo with diastolic dysfunction. She was given IV lasix pushes as needed and was started on dexamethasone.  Her abx was escalated to vanc and cefepime. She improved with diuresis and steroids. Pulmonary were consulted. Her O2 requirements continued to improve. On 4/15 she was switched to Augmentin. PT recommended discharging her to SNF but the patient refused and she wanted to go home with home health. She qualified for home oxygen so she was discharged on 3 L nc while at rest and 6 while active. Augmentin and dexamethasone were discontinued on discharge"     She was readmitted from 4/27 to 5/3/18 with who presented to the ED with a complaint of fatigue and worsening DELA CRUZ. Pt diagnosed PNA 4/9 and was discharged on 4/16 on 3L oxygen. She was initially placed on cefepime for 3 days followed by an add'l 2 days of Augmentin. Pulm was consulted with assistance as her oxygen requirements were increasing. She was placed on oral steroids, then trailed on 80mg TID solumedrol for 2 days and will be discharged on a prednisone taper. She was also diuresed with 2 days of 40mg IV lasix with appropriate UOP resulting, improvement on repeat CXR. She was medically stable and appropriate for DC home with home health on 1L continuous O2. She will be seen for close f/u and may be able to come off oxygen at that time.      She discontinued Tagrisso in April 2018. Restaging scans from 6/4/18 revealed "Stable appearance of a spiculated right upper lobe pulmonary lesion.  Additional " "irregular focus superior to the lesion in the right lung apex is stable and may represent scar or additional lesion; although this was not hypermetabolic on prior PET-CT.  No new pulmonary lesions. 1.3 cm subcarinal lymph node, not hypermetabolic on prior PET-CT. Stable postsurgical changes of a Whipple procedure. Bilateral nonobstructing nephrolithiasis.  Stable sclerotic focus in the T5 vertebral body, possibly a bone island as this was not hypermetabolic on prior PET-CT. Small hiatal hernia. RECIST SUMMARY: Date of prior examination for comparison 01/30/2018 Lesion 1: Right upper lobe 1.3 cm Series 2 image 31 prior measurement 1.3 cm"  Bone scan also from 6/4/18 revealed no evidence of mets.     Her PET scan from 7/11/18 revealed "Right suprahilar lesion SUV max 3.76, previously 6.86. Pretracheal lymph node SUV max 2.55, previously 3.79. There is physiologic intracranial, head, and neck activity.  There is muscle activation.  There is vocal cord activation.  There is physiologic liver, spleen, GI and  activity.  There is a hiatal hernia.  Pelvic organs show nothing unusual.  No bone lesions are seen.  There are areas of benign appearing lung scar"  She notes fatigue.  She denies any nausea, vomiting, diarrhea, constipation, abdominal pain, weight loss or loss of appetite, chest pain, shortness of breath, leg swelling, fatigue, pain, headache, dizziness, or mood changes. Her ECOG PS is 2. She is accompanied by her  and son     She has been on Tarceva since 6/5/18      HPI She comes to review labs and scans on Tarceva. PET scan from 1/14/19 reveals "Lesion 1: Paratracheal lymph node, shows max SUV of 5.2 previously max SUV 4.4 Lesion 2: Right suprahilar lymph node, shows max SUV of 6.0 previously max SUV 5.0 No other abnormal regions of tracer uptake are identified"      Review of Systems   Constitutional: Negative for appetite change, fatigue and unexpected weight change.   HENT: Negative for mouth sores. "    Eyes: Negative for visual disturbance.   Respiratory: Negative for cough and shortness of breath.    Cardiovascular: Negative for chest pain.   Gastrointestinal: Negative for abdominal pain and diarrhea.   Genitourinary: Negative for frequency.   Musculoskeletal: Negative for back pain.   Skin: Negative for rash.   Neurological: Negative for headaches.   Hematological: Negative for adenopathy.   Psychiatric/Behavioral: The patient is not nervous/anxious.    All other systems reviewed and are negative.      Objective:      Physical Exam   Constitutional: She is oriented to person, place, and time. She appears well-developed and well-nourished.   HENT:   Mouth/Throat: No oropharyngeal exudate.   Cardiovascular: Normal rate and normal heart sounds.   Pulmonary/Chest: Effort normal and breath sounds normal. She has no wheezes.   Abdominal: Soft. Bowel sounds are normal. There is no tenderness.   Musculoskeletal: She exhibits no edema or tenderness.   Lymphadenopathy:     She has no cervical adenopathy.   Neurological: She is alert and oriented to person, place, and time. Coordination normal.   Skin: Skin is warm and dry. No rash noted.   Psychiatric: She has a normal mood and affect. Judgment and thought content normal.   Vitals reviewed.        LABS:  WBC   Date Value Ref Range Status   01/18/2019 6.49 3.90 - 12.70 K/uL Final     Hemoglobin   Date Value Ref Range Status   01/18/2019 10.5 (L) 12.0 - 16.0 g/dL Final     POC Hematocrit   Date Value Ref Range Status   01/12/2016 25 (L) 36 - 54 %PCV Final     Hematocrit   Date Value Ref Range Status   01/18/2019 33.3 (L) 37.0 - 48.5 % Final     Platelets   Date Value Ref Range Status   01/18/2019 248 150 - 350 K/uL Final     Gran # (ANC)   Date Value Ref Range Status   01/18/2019 4.0 1.8 - 7.7 K/uL Final     Comment:     The ANC is based on a white cell differential from an   automated cell counter. It has not been microscopically   reviewed for the presence of abnormal  cells. Clinical   correlation is required.         Chemistry        Component Value Date/Time     01/18/2019 1209    K 3.9 01/18/2019 1209     (H) 01/18/2019 1209    CO2 19 (L) 01/18/2019 1209    BUN 22 01/18/2019 1209    CREATININE 1.5 (H) 01/18/2019 1209    GLU 84 01/18/2019 1209        Component Value Date/Time    CALCIUM 8.5 (L) 01/18/2019 1209    ALKPHOS 68 01/18/2019 1209    AST 28 01/18/2019 1209    ALT 21 01/18/2019 1209    BILITOT 1.1 (H) 01/18/2019 1209    ESTGFRAFRICA 39.3 (A) 01/18/2019 1209    EGFRNONAA 34.1 (A) 01/18/2019 1209        CORRECTED CALCIUM: 9.5   Assessment:       1. Malignant neoplasm of lower lobe of right lung    2. Brain metastases    3. Secondary cancer of bone        Plan:        1,2,3. Scans reveals stable disease. She will continue on Tarceva. Xgeva today and will return in 3 months with labs and scans (MRI brain and PET scan)    Above care plan was discussed with patient and accompanying  and all questions were addressed to their satisfaction

## 2019-01-18 NOTE — NURSING
Patient received Xgeva injection in ABD.  Tolerated well.  Patient's Ca+ 8.5.  Patient instructed to take 3 ca+ in the AM and 3 ca+ in the evening but CrCl WNL to treat.  Patient ambulated off unit with steady gait.

## 2019-01-22 ENCOUNTER — TELEPHONE (OUTPATIENT)
Dept: HEMATOLOGY/ONCOLOGY | Facility: CLINIC | Age: 74
End: 2019-01-22

## 2019-01-22 NOTE — TELEPHONE ENCOUNTER
Spoke to son, discussed everything below. Son states he is concerned about the increased uptake, states his mother has been through to much. Assured him that Zandra or Dr. Vazquez would be in touch after thoracic conference with information on what was discussed and recommended.

## 2019-01-22 NOTE — TELEPHONE ENCOUNTER
Left vm explaining to patient the below---  Requested patient to contact nurse tomorrow if he has further questions.        Joceline/darwin,  Can she be added to thoracic conference?  ~wen

## 2019-01-22 NOTE — TELEPHONE ENCOUNTER
Spoke with son.  He is calling to review recent petscan results with dr sullivan, as his mom could not express what the results were. He has numerous questions about petscans last year compared to this year.     He is also wanting to know why the last MRI brain was in November---and the next one won't be until April.  Nurse explained to son patient is having petscan is April---so mri brain is simply being scheduled around the same time---  If she has symptoms, of course we will get it sooner.      Also, son is wanting to speak with dr sullivan about whether his mom qualifies for surgery.    Nurse informed son she would have to ask dr sullivan to answer these questions, as she is not certain on the answers. Son was talking in circles with nurse---and it was very difficult for nurse to follow his train of thought.      Message routed to dr sullivan

## 2019-01-22 NOTE — TELEPHONE ENCOUNTER
----- Message from Penny Cohen sent at 1/22/2019  9:22 AM CST -----  Contact: Pt son   Pt son called to speak with nurse wen have some questions about her appts  Callback [phone]  Thank You  LOLLY Cohen

## 2019-01-22 NOTE — TELEPHONE ENCOUNTER
----- Message from Joe Ferrera sent at 1/22/2019  3:49 PM CST -----  Contact: Pt's Son Basil Lopez [phone]  Pt's Son Basil Lopez missed a call and would like the nurse to return his call.    Son can be reached at [phone].    Thanks

## 2019-01-22 NOTE — TELEPHONE ENCOUNTER
There is slight increase uptake but no new lesions so we can continue Tarceva.   We are not doing MRI every 3 months because she is not symptomatic. Not a candidate for surgery as she was stage IV however it is an interesting question so I can present her at conference.  Can you add her to Thoracic conference

## 2019-01-23 ENCOUNTER — TELEPHONE (OUTPATIENT)
Dept: HEMATOLOGY/ONCOLOGY | Facility: CLINIC | Age: 74
End: 2019-01-23

## 2019-01-23 ENCOUNTER — DOCUMENTATION ONLY (OUTPATIENT)
Dept: CARDIOTHORACIC SURGERY | Facility: CLINIC | Age: 74
End: 2019-01-23

## 2019-01-23 DIAGNOSIS — C34.90 MALIGNANT NEOPLASM OF LUNG, UNSPECIFIED LATERALITY, UNSPECIFIED PART OF LUNG: Primary | ICD-10-CM

## 2019-01-23 NOTE — PATIENT CARE CONFERENCE
[facility]      THORACIC MULTIDISCIPLINARY TUMOR BOARD  PATIENT REVIEW FORM  ________________________________________________________________________    CLINIC #: [id number]  DATE: 01/23/2019    DIAGNOSIS:   Metastatic Adenocarcinoma     PRESENTER:   Dr. Vazquez     PATIENT SUMMARY:   70 yo female pancreatic mass s/p whipple and lung cancer diagnosed by brain resection   Reported meningoma resection in past. More recent resection by neurosurgery on 1/12/2016 and pathology from that revealed malignant neoplasm with multiple features pointing towards metastatic papillary serous adenocarcinoma, TTF1 positive pointing to a lung primary. Chest CT at that time also showed a RUL lesion in the apex with associated paratracheal LN. PET 2016 with metastatic lesion (biopsy proven) to sacrum. Focal brain RT completed in March 2016. Started on with Tarceva. PET response until Nov 2017 - RUL and paratracheal avidity. Progressed on Tarceva so transitioned to Tagrisso but this was discontinued after reaction in April 2018. Restarted on Tarceva in June 2018. PET in Jan 2019 shows mild increase in avidity of paratracheal and suprahilar regions.     BOARD RECOMMENDATIONS:   Persistent and mildly increasing PET avidity in the right paratracheal and right suprahilar regions. Continue Tarceva. Recommend referral to radiation oncology for evaluation as there may be a role for focal radiation.     CONSULT NEEDED:     [] Surgery    [] Hem/Onc    [x] Rad/Onc    [] Dietary                 [] Social Service    [] Psychology       [] Pulmonology    Clinical Stage: Tumor 1 Node(s) 2 Metastasis 1c  Pathologic Stage: Tumor  Node(s)  Metastasis        GROUP STAGE:     [] O    [] 1A    [] IB    [] IIA    [] IIB     [] IIIA     [] IIIB     [] IIIC    [x] IV                               [] Local recurrence     [] Regional recurrence     [] Distant recurrence                   [] NSCLC     [] SCLC     Tumor type adenocarcinoma     Unstageable:       [] Yes     [x] No  Metastatic site(s): brain, sacrum         [x] Nohelia'l Treatment Guidelines reviewed and care planned is consistent with guidelines.         (i.e., NCCN, NCI, PD, ACO, AUA, etc.)    PRESENTATION AT CANCER CONFERENCE:         [] Prospective    [] Retrospective     [] Follow-Up          [] Eligible for clinical trial

## 2019-01-24 ENCOUNTER — TELEPHONE (OUTPATIENT)
Dept: HEMATOLOGY/ONCOLOGY | Facility: CLINIC | Age: 74
End: 2019-01-24

## 2019-01-24 ENCOUNTER — TELEPHONE (OUTPATIENT)
Dept: RADIATION ONCOLOGY | Facility: CLINIC | Age: 74
End: 2019-01-24

## 2019-01-24 NOTE — TELEPHONE ENCOUNTER
Spoke with pt to inform her of her appointment with Dr. Ferrera, 1/25@ 9am. Pt  Verbalized understanding.

## 2019-01-24 NOTE — TELEPHONE ENCOUNTER
Returned call to patient.  She is calling to r/s her consult with RT to a date/time which works for her.    Message routed to josh

## 2019-01-24 NOTE — TELEPHONE ENCOUNTER
----- Message from Penny Cohen sent at 1/24/2019  9:58 AM CST -----  Pt called to speak with nurse Zandra have some questions but would not provide any additional information   Callback#[phone]  Thank You   LOLLY Cohen

## 2019-01-28 DIAGNOSIS — C34.90 MALIGNANT NEOPLASM OF LUNG, UNSPECIFIED LATERALITY, UNSPECIFIED PART OF LUNG: Primary | ICD-10-CM

## 2019-01-29 ENCOUNTER — INITIAL CONSULT (OUTPATIENT)
Dept: RADIATION ONCOLOGY | Facility: CLINIC | Age: 74
End: 2019-01-29
Payer: MEDICARE

## 2019-01-29 ENCOUNTER — TELEPHONE (OUTPATIENT)
Dept: HEMATOLOGY/ONCOLOGY | Facility: CLINIC | Age: 74
End: 2019-01-29

## 2019-01-29 VITALS
BODY MASS INDEX: 23.32 KG/M2 | TEMPERATURE: 98 F | HEIGHT: 66 IN | OXYGEN SATURATION: 99 % | HEART RATE: 68 BPM | SYSTOLIC BLOOD PRESSURE: 168 MMHG | DIASTOLIC BLOOD PRESSURE: 70 MMHG | WEIGHT: 145.13 LBS | RESPIRATION RATE: 18 BRPM

## 2019-01-29 DIAGNOSIS — C77.1 SECONDARY MALIGNANT NEOPLASM OF INTRATHORACIC LYMPH NODES: ICD-10-CM

## 2019-01-29 PROCEDURE — 99499 RISK ADDL DX/OHS AUDIT: ICD-10-PCS | Mod: HCNC,S$GLB,, | Performed by: RADIOLOGY

## 2019-01-29 PROCEDURE — 99499 UNLISTED E&M SERVICE: CPT | Mod: HCNC,S$GLB,, | Performed by: RADIOLOGY

## 2019-01-29 PROCEDURE — 3077F PR MOST RECENT SYSTOLIC BLOOD PRESSURE >= 140 MM HG: ICD-10-PCS | Mod: CPTII,S$GLB,, | Performed by: RADIOLOGY

## 2019-01-29 PROCEDURE — 1100F PR PT FALLS ASSESS DOC 2+ FALLS/FALL W/INJURY/YR: ICD-10-PCS | Mod: CPTII,S$GLB,, | Performed by: RADIOLOGY

## 2019-01-29 PROCEDURE — 3077F SYST BP >= 140 MM HG: CPT | Mod: CPTII,S$GLB,, | Performed by: RADIOLOGY

## 2019-01-29 PROCEDURE — 99215 OFFICE O/P EST HI 40 MIN: CPT | Mod: S$GLB,,, | Performed by: RADIOLOGY

## 2019-01-29 PROCEDURE — 99999 PR PBB SHADOW E&M-EST. PATIENT-LVL III: ICD-10-PCS | Mod: PBBFAC,,, | Performed by: RADIOLOGY

## 2019-01-29 PROCEDURE — 3078F DIAST BP <80 MM HG: CPT | Mod: CPTII,S$GLB,, | Performed by: RADIOLOGY

## 2019-01-29 PROCEDURE — 3288F PR FALLS RISK ASSESSMENT DOCUMENTED: ICD-10-PCS | Mod: CPTII,S$GLB,, | Performed by: RADIOLOGY

## 2019-01-29 PROCEDURE — 99215 PR OFFICE/OUTPT VISIT, EST, LEVL V, 40-54 MIN: ICD-10-PCS | Mod: S$GLB,,, | Performed by: RADIOLOGY

## 2019-01-29 PROCEDURE — 3078F PR MOST RECENT DIASTOLIC BLOOD PRESSURE < 80 MM HG: ICD-10-PCS | Mod: CPTII,S$GLB,, | Performed by: RADIOLOGY

## 2019-01-29 PROCEDURE — 1100F PTFALLS ASSESS-DOCD GE2>/YR: CPT | Mod: CPTII,S$GLB,, | Performed by: RADIOLOGY

## 2019-01-29 PROCEDURE — 99999 PR PBB SHADOW E&M-EST. PATIENT-LVL III: CPT | Mod: PBBFAC,,, | Performed by: RADIOLOGY

## 2019-01-29 PROCEDURE — 3288F FALL RISK ASSESSMENT DOCD: CPT | Mod: CPTII,S$GLB,, | Performed by: RADIOLOGY

## 2019-01-29 NOTE — TELEPHONE ENCOUNTER
Spoke with patient's son--he is calling to see when patient needs to follow up with dr sullivan. Patient's SIM is scheduled for Friday. She will receive SBRT-- about 60Gy in 8 fractions. Nurse informed son she would follow up with him tomorrow after speaking with dr sullivan. He thanked nurse.    Message routed to dr sullivan

## 2019-01-29 NOTE — TELEPHONE ENCOUNTER
She should hold the chemo while on RT like starting the day of and can take the day after she completes RT

## 2019-01-29 NOTE — TELEPHONE ENCOUNTER
----- Message from Alina Sigala sent at 1/29/2019  3:02 PM CST -----  Contact: Basil (son)  Pt wanted to f/u with Dr. Vazquez and let her know that the simulation is scheduled for 02/01, also to check clinic notes from consult with Dr. Ferrera.    Contact::  [phone]

## 2019-01-29 NOTE — TELEPHONE ENCOUNTER
She continues oral chemo, correct, while on RT.  She will just not see you or get xgeva until post RT?  ~wen

## 2019-01-29 NOTE — PROGRESS NOTES
01/29/2019    Radiation Oncology Consultation    Assessment   This is a 73 y.o. y/o female with Stage IV NSCLC (adeno, EGFR-mutant) diagnosed in 1/2016. This was discovered due to symptomatic Left frontal metastasis that was resected and received 20 Gy in 5 fx to the cavity by Dr. Paris in 3/2016. She has been treated mostly with Tarceva with progressive disease in 2 thoracic (Right paratracheal, Right hilar) nodes in 11/2018 and 1/14/19 PET/CT. Her history is also significant for Left breast cancer treated in 1993 and pancreatic cancer s/p Whipple in 1998. She is asymptomatic at this time but felt not to be a good candidate for cytotoxic chemotherapy. She is referred for consideration of SBRT of oligoprogressive NSCLC.     I discussed the natural history and treatment options available for lung cancer that is progressing in only limited sites. Her case was discussed at the Thoracic Multidisciplinary tumor board, and she was deemed not a candidate for surgical resection and a poor candidate for cytotoxic chemotherapy. There is data to suggest, particularly for NSCLC with actionable mutations, that focal SBRT to sites of oligoprogression can allow patients to continue their current targeted therapy for longer periods and improve progression-free survival. I recommend treating the 2 lymph nodes to ~60 Gy in 8 fractions using hypofractionated radiation therapy (with SBRT planning/technique). The main organs at risk are trachea, esophagus, and bronchial tree. I discussed potential short- and long-term side effects of treatment to these areas with particular concern for fistula and stenosis. At the conclusion of our discussion, she was in agreement with proceeding with the recommended treatment.      Plan   1) Schedule CT Simulation for radiation treatment planning.        Chief Complaint   Patient presents with    Metastatic adenocarcinoma     Consult       HPI: Mrs. St is a 72 y/o female with Stage IV NSCLC  (adeno, EGFR-mutant) diagnosed in 1/2016. This was discovered due to symptomatic Left frontal metastasis that was resected and received 20 Gy in 5 fx to the cavity by Dr. Paris in 3/2016. She was treated with Tarceva until progresssion in 12/2017, at which time she was switched to Tagrisso. This was discontinued in 4/2018 due to toxicity. She resumed Tarceva 6/2018. Since that time, she has had gradual progression of disease on PET/CT in a Right paratracheal lymph node and Right hilar node, particularly from 9/2018 scan to most recent on 1/14/19. She otherwise is feeling well without significant dyspnea, weight loss, or fevers. Her history is also significant for Left breast cancer treated in 1993 and pancreatic cancer s/p Whipple in 1998.         Prior Radiation History: 20 Gy in 5 (IMRT) to Left frontal brain cavity in 3/2016 by Dr. Paris.     Past Medical History:   Diagnosis Date    Anticoagulant long-term use     Blood clot in vein 06/2016    Breast cancer 1994    Cataract     Hypertension     Lung cancer     Lung cancer metastatic to brain     Pancreatic cancer 1998    Posterior capsular opacification, left eye     Psychiatric problem     Seizures 01/25/2016    Stroke     Therapy        Past Surgical History:   Procedure Laterality Date    [id number] N/A 3/9/2016    Performed [facility] OR 2ND FLR    BONE BIOSPY  3/9/16    BRAIN SURGERY  1/12/16    tumor removal 1/12/16    BREAST LUMPECTOMY  1998    left    BRONCHOSCOPY N/A 12/5/2017    Performed by [facility] Zhang [facility] at [facility] OR 2ND FLR    CATARACT EXTRACTION Bilateral 2004    yag OU    CHOLECYSTECTOMY  1998    COLONOSCOPY N/A 11/7/2018    Performed by Jim Raman MD at [facility] ENDO (4TH FLR)    COLONOSCOPY N/A 11/13/2015    Performed by Alex Carmona [facility] at [facility] ENDO (4TH FLR)    COLONOSCOPY N/A 9/26/2013    Performed by Gavin Prasad MD at [facility] ENDO (4TH FLR)    CRANIOTOMY WITH  STEALTH Left 2016    Performed by Sal[facility] Ferrera MD at [facility] OR 2ND FLR    cysto and right ureteral stent  12    ecoli      removal of blockage, done throat, then through the liver.    ENDOBRONCHIAL ULTRASOUND (EBUS) N/A 2017    Performed by [facility] Zhang [facility] at [facility] OR 2ND FLR    ESOPHAGOGASTRODUODENOSCOPY (EGD) N/A 3/14/2018    Performed by Alex Carmona MD at [facility] ENDO (4TH FLR)    HYSTERECTOMY  1974    KIDNEY STONE SURGERY  --laser    left eswl  12    OOPHORECTOMY  2012    Laparoscopic BSO, lysis of adhesions, cystoscopy greater than 35mins     WHIPPLE PROCEDURE W/ LAPAROSCOPY         Social History     Tobacco Use    Smoking status: Former Smoker     Packs/day: 0.00     Years: 0.00     Pack years: 0.00     Last attempt to quit: 1961     Years since quittin.3    Smokeless tobacco: Never Used   Substance Use Topics    Alcohol use: No    Drug use: No       Cancer-related family history includes Breast cancer in her mother; Lung cancer in her mother. There is no history of Ovarian cancer.    Current Outpatient Medications on File Prior to Visit   Medication Sig Dispense Refill    amitriptyline (ELAVIL) 50 MG tablet TAKE ONE TABLET BY MOUTH EVERY EVENING 30 tablet 3    amLODIPine (NORVASC) 5 MG tablet Take 1 tablet (5 mg total) by mouth once daily. 30 tablet 6    apixaban (ELIQUIS) 5 mg Tab TAKE 1 TABLET BY MOUTH TWO TIMES A DAY 60 tablet 6    atorvastatin (LIPITOR) 40 MG tablet TAKE ONE TABLET BY MOUTH ONCE DAILY 90 tablet 3    CREON CpDR TAKE ONE CAPSULE BY MOUTH THREE TIMES A DAY WITH MEALS 270 capsule 3    denosumab (XGEVA) 120 mg/1.7 mL (70 mg/mL) Soln Inject 120 mg into the skin every 28 days.      dronabinol (MARINOL) 5 MG capsule Take 1 capsule (5 mg total) by mouth 2 (two) times daily before meals. 60 capsule 3    levETIRAcetam (KEPPRA) 500 MG Tab Take 1 tablet (500 mg total) by mouth 2 (two) times daily. 180 tablet 1    LORazepam  (ATIVAN) 1 MG tablet TAKE ONE TABLET BY MOUTH TWICE DAILY 60 tablet 2    losartan (COZAAR) 50 MG tablet Take 1 tablet (50 mg total) by mouth 2 (two) times daily. 60 tablet 10    metoprolol succinate (TOPROL-XL) 50 MG 24 hr tablet Take 1 tablet (50 mg total) by mouth once daily.      multivitamin (THERAGRAN) per tablet Take 1 tablet by mouth once daily.      MYRBETRIQ 50 mg Tb24 TAKE ONE TABLET BY MOUTH ONCE DAILY 30 tablet 11    polyethylene glycol (GLYCOLAX) 17 gram/dose powder       TARCEVA 150 mg tablet TAKE 1 TABLET BY MOUTH EVERY DAY 30 tablet 0    clindamycin phosphate 1% (CLINDAGEL) 1 % gel APPLY TOPICALLY TWICE DAILY 60 g 6    senna-docusate 8.6-50 mg (SENNA LAXATIVE-STOOL SOFTENER) 8.6-50 mg per tablet Take 1 tablet by mouth once daily.      [DISCONTINUED] doxycycline (VIBRAMYCIN) 100 MG Cap Take 1 capsule (100 mg total) by mouth every 12 (twelve) hours. 30 capsule 0     Current Facility-Administered Medications on File Prior to Visit   Medication Dose Route Frequency Provider Last Rate Last Dose    denosumab (XGEVA) solution 120 mg  120 mg Subcutaneous 1 time in Clinic/HOD Karrie Vazquez MD           Review of patient's allergies indicates:   Allergen Reactions    Tobradex [tobramycin-dexamethasone] Swelling    Iodinated contrast- oral and iv dye Hives    Latex Rash    Phenytoin sodium extended Other (See Comments) and Rash       Review of Systems   Constitutional: Negative for fever and weight loss.   HENT: Negative for ear pain and sore throat.    Eyes: Negative for blurred vision and double vision.   Respiratory: Positive for cough. Negative for hemoptysis and shortness of breath.    Cardiovascular: Positive for leg swelling. Negative for chest pain.   Gastrointestinal: Positive for constipation. Negative for abdominal pain, diarrhea, heartburn and nausea.   Genitourinary: Negative for dysuria and hematuria.   Musculoskeletal: Negative for falls and joint pain.   Neurological: Positive for  "speech change. Negative for tingling, focal weakness, seizures and headaches.   Psychiatric/Behavioral: Positive for depression. The patient is not nervous/anxious.         Vital Signs: BP (!) 168/70 (BP Location: Right arm, Patient Position: Sitting)   Pulse 68   Temp 97.7 °F (36.5 °C) (Oral)   Resp 18   Ht 5' 6" (1.676 m)   Wt 65.8 kg (145 lb 1.6 oz)   LMP  (LMP Unknown)   SpO2 99%   BMI 23.42 kg/m²     ECOG Performance Status: 2 - Ambulates, capable of self care only    Physical Exam   Constitutional: She is oriented to person, place, and time and well-developed, well-nourished, and in no distress.   HENT:   Head: Normocephalic and atraumatic.   Mouth/Throat: Oropharynx is clear and moist.   Eyes: EOM are normal. No scleral icterus.   Neck: Normal range of motion. Neck supple.   Pulmonary/Chest: No accessory muscle usage. No respiratory distress.   Abdominal: Soft. Normal appearance. She exhibits no distension.   Musculoskeletal: Normal range of motion. She exhibits no edema.   Lymphadenopathy:     She has no cervical adenopathy.        Right: No supraclavicular adenopathy present.        Left: No supraclavicular adenopathy present.   Neurological: She is alert and oriented to person, place, and time. No cranial nerve deficit. Gait normal.   Skin: Skin is warm and dry.   Psychiatric: Mood, affect and judgment normal.   Vitals reviewed.       Labs:    Imaging: I have personally reviewed the patient's available images and reports and summarized pertinent findings above in HPI.     Pathology: I have personally reviewed the patient's available pathology and summarized pertinent findings above in HPI.       - Thank you for allowing me to participate in the care of your patient.    Kristopher Ferrera MD, PhD  "

## 2019-01-29 NOTE — LETTER
January 29, 2019      Karrie Vazquez MD  [address]  [place] [address]           [facility] - Radiation Oncology  [address]  [place] [address]  Phone: [phone]          Patient: Naty St   MR Number: [id number]   YOB: 1945   Date of Visit: 1/29/2019       Dear Dr. Karrie Vazquez:    Thank you for referring Naty St to me for evaluation. Attached you will find relevant portions of my assessment and plan of care.    If you have questions, please do not hesitate to call me. I look forward to following Naty St along with you.    Sincerely,    Kristopher Ferrera MD    Enclosure  CC:  No Recipients    If you would like to receive this communication electronically, please contact [email or web address] or [phone] to request more information on [employer] access.    For providers and/or their staff who would like to refer a patient to [facility], please contact us through our one-stop-shop provider referral line, [facility], at [phone].    If you feel you have received this communication in error or would no longer like to receive these types of communications, please e-mail [email or web address]

## 2019-01-31 ENCOUNTER — TELEPHONE (OUTPATIENT)
Dept: HEMATOLOGY/ONCOLOGY | Facility: CLINIC | Age: 74
End: 2019-01-31

## 2019-01-31 DIAGNOSIS — C79.31 BRAIN METASTASES: ICD-10-CM

## 2019-01-31 RX ORDER — LEVETIRACETAM 500 MG/1
TABLET ORAL
Qty: 180 TABLET | Refills: 1 | Status: SHIPPED | OUTPATIENT
Start: 2019-01-31 | End: 2019-07-25 | Stop reason: SDUPTHER

## 2019-01-31 NOTE — TELEPHONE ENCOUNTER
----- Message from Penny Cohen sent at 1/31/2019 11:07 AM CST -----  Contact: Pt   Pt son called to speak with nurse wen have some question just a follow up call   Callback#[phone]  Thank You  LOLLY Cohen

## 2019-01-31 NOTE — TELEPHONE ENCOUNTER
Spoke with shirley  Advised him his mom will hold chemo while on RT---  Dr. Vazquez will see her post completion of RT---to resume chemo.  Son voiced understanding.

## 2019-02-01 ENCOUNTER — HOSPITAL ENCOUNTER (OUTPATIENT)
Dept: RADIATION THERAPY | Facility: HOSPITAL | Age: 74
Discharge: HOME | End: 2019-02-01
Attending: RADIOLOGY
Payer: MEDICARE

## 2019-02-01 DIAGNOSIS — F32.A DEPRESSION, UNSPECIFIED DEPRESSION TYPE: ICD-10-CM

## 2019-02-01 PROCEDURE — 77263 PR  RADIATION THERAPY PLAN COMPLEX: ICD-10-PCS | Mod: HCNC,,, | Performed by: RADIOLOGY

## 2019-02-01 PROCEDURE — 77290 PR  SET RADN THERAPY FIELD COMPLEX: ICD-10-PCS | Mod: 26,HCNC,, | Performed by: RADIOLOGY

## 2019-02-01 PROCEDURE — 77334 RADIATION TREATMENT AID(S): CPT | Mod: TC,HCNC | Performed by: RADIOLOGY

## 2019-02-01 PROCEDURE — 77334 PR  RADN TREATMENT AID(S) COMPLX: ICD-10-PCS | Mod: 26,HCNC,, | Performed by: RADIOLOGY

## 2019-02-01 PROCEDURE — 77014 HC CT GUIDANCE RADIATION THERAPY FLDS PLACEMENT: CPT | Mod: TC,HCNC | Performed by: RADIOLOGY

## 2019-02-01 PROCEDURE — 77334 RADIATION TREATMENT AID(S): CPT | Mod: 26,HCNC,, | Performed by: RADIOLOGY

## 2019-02-01 PROCEDURE — 77290 THER RAD SIMULAJ FIELD CPLX: CPT | Mod: 26,HCNC,, | Performed by: RADIOLOGY

## 2019-02-01 PROCEDURE — 77290 THER RAD SIMULAJ FIELD CPLX: CPT | Mod: TC,HCNC | Performed by: RADIOLOGY

## 2019-02-01 PROCEDURE — 77263 THER RADIOLOGY TX PLNG CPLX: CPT | Mod: HCNC,,, | Performed by: RADIOLOGY

## 2019-02-01 RX ORDER — AMITRIPTYLINE HYDROCHLORIDE 50 MG/1
TABLET, FILM COATED ORAL
Qty: 30 TABLET | Refills: 3 | Status: SHIPPED | OUTPATIENT
Start: 2019-02-01 | End: 2019-05-20 | Stop reason: SDUPTHER

## 2019-02-06 PROCEDURE — 77293 PR RESPIRATORY MOTION MGMT SIMULATION: ICD-10-PCS | Mod: 26,HCNC,, | Performed by: RADIOLOGY

## 2019-02-06 PROCEDURE — 77301 RADIOTHERAPY DOSE PLAN IMRT: CPT | Mod: 26,HCNC,, | Performed by: RADIOLOGY

## 2019-02-06 PROCEDURE — 77293 RESPIRATOR MOTION MGMT SIMUL: CPT | Mod: 26,HCNC,, | Performed by: RADIOLOGY

## 2019-02-06 PROCEDURE — 77293 RESPIRATOR MOTION MGMT SIMUL: CPT | Mod: TC,HCNC | Performed by: RADIOLOGY

## 2019-02-06 PROCEDURE — 77301 PR  INTEN MOD RADIOTHER PLAN W/DOSE VOL HIST: ICD-10-PCS | Mod: 26,HCNC,, | Performed by: RADIOLOGY

## 2019-02-06 PROCEDURE — 77301 RADIOTHERAPY DOSE PLAN IMRT: CPT | Mod: TC,HCNC | Performed by: RADIOLOGY

## 2019-02-07 PROCEDURE — 77338 DESIGN MLC DEVICE FOR IMRT: CPT | Mod: 26,HCNC,, | Performed by: RADIOLOGY

## 2019-02-07 PROCEDURE — 77338 PR  MLC IMRT DESIGN & CONSTRUCTION PER IMRT PLAN: ICD-10-PCS | Mod: 26,HCNC,, | Performed by: RADIOLOGY

## 2019-02-07 PROCEDURE — 77300 RADIATION THERAPY DOSE PLAN: CPT | Mod: 26,HCNC,, | Performed by: RADIOLOGY

## 2019-02-07 PROCEDURE — 77370 RADIATION PHYSICS CONSULT: CPT | Mod: HCNC | Performed by: RADIOLOGY

## 2019-02-07 PROCEDURE — 77300 PR RADIATION THERAPY,DOSIMETRY PLAN: ICD-10-PCS | Mod: 26,HCNC,, | Performed by: RADIOLOGY

## 2019-02-07 PROCEDURE — 77300 RADIATION THERAPY DOSE PLAN: CPT | Mod: TC,HCNC | Performed by: RADIOLOGY

## 2019-02-07 PROCEDURE — 77338 DESIGN MLC DEVICE FOR IMRT: CPT | Mod: TC,HCNC | Performed by: RADIOLOGY

## 2019-02-08 ENCOUNTER — TELEPHONE (OUTPATIENT)
Dept: HEMATOLOGY/ONCOLOGY | Facility: CLINIC | Age: 74
End: 2019-02-08

## 2019-02-08 NOTE — TELEPHONE ENCOUNTER
----- Message from Dorinda Giles sent at 2/8/2019  9:15 AM CST -----  Contact: Basil (pts Son)   Pt requesting to speak with the nurse about appt on 2/28  Please contact Basil at [phone]

## 2019-02-08 NOTE — TELEPHONE ENCOUNTER
----- Message from Dorinda Giles sent at 2/8/2019  2:06 PM CST -----  Contact: Pt's Son  Pt's Son returning a missed call. Please contact at [phone]

## 2019-02-11 ENCOUNTER — TELEPHONE (OUTPATIENT)
Dept: HEMATOLOGY/ONCOLOGY | Facility: CLINIC | Age: 74
End: 2019-02-11

## 2019-02-11 ENCOUNTER — DOCUMENTATION ONLY (OUTPATIENT)
Dept: RADIATION ONCOLOGY | Facility: CLINIC | Age: 74
End: 2019-02-11

## 2019-02-11 DIAGNOSIS — C34.31 MALIGNANT NEOPLASM OF LOWER LOBE OF RIGHT LUNG: ICD-10-CM

## 2019-02-11 PROCEDURE — 77014 HC CT GUIDANCE RADIATION THERAPY FLDS PLACEMENT: CPT | Mod: TC,HCNC | Performed by: RADIOLOGY

## 2019-02-11 RX ORDER — ERLOTINIB HYDROCHLORIDE 150 MG/1
TABLET ORAL
Qty: 30 TABLET | Refills: 0 | Status: SHIPPED | OUTPATIENT
Start: 2019-02-11 | End: 2019-03-16 | Stop reason: SDUPTHER

## 2019-02-11 NOTE — PLAN OF CARE
Problem: Adult Inpatient Plan of Care  Goal: Plan of Care Review  Outcome: Ongoing (interventions implemented as appropriate)  First day of XRT to the lung. Nursing education done and handout given and discussed.

## 2019-02-12 ENCOUNTER — TELEPHONE (OUTPATIENT)
Dept: RADIATION ONCOLOGY | Facility: CLINIC | Age: 74
End: 2019-02-12

## 2019-02-12 PROCEDURE — 77386 HC IMRT, COMPLEX: CPT | Mod: HCNC | Performed by: RADIOLOGY

## 2019-02-12 PROCEDURE — 77014 HC CT GUIDANCE RADIATION THERAPY FLDS PLACEMENT: CPT | Mod: TC,HCNC | Performed by: RADIOLOGY

## 2019-02-12 NOTE — TELEPHONE ENCOUNTER
Spoke with son. He is calling to ask if his mom had RT today, as patient told him she didn't have RT today.   Per patient, RT needed to be delayed as they were waiting on markers and new pinpoints.    Nurse explained to son it is documented in the chart she did have RT today.    Son would like to speak with dr mckeon's nurse---as he has many questions.    Message routed to dr mckeon's staff

## 2019-02-12 NOTE — TELEPHONE ENCOUNTER
----- Message from Penny Cohen sent at 2/11/2019  4:44 PM CST -----  Contact: pt son   Pt son called to speak with nurse Zandra have some questions and a follow up call   Callback#[phone]  Thank You  LOLLY Cohen

## 2019-02-12 NOTE — TELEPHONE ENCOUNTER
Clarified information about radiation treatments for mother and informed son that she will be getting treatment daily Monday-Friday.

## 2019-02-13 PROCEDURE — 77014 HC CT GUIDANCE RADIATION THERAPY FLDS PLACEMENT: CPT | Mod: TC,HCNC | Performed by: RADIOLOGY

## 2019-02-13 PROCEDURE — 77386 HC IMRT, COMPLEX: CPT | Mod: HCNC | Performed by: RADIOLOGY

## 2019-02-14 ENCOUNTER — TELEPHONE (OUTPATIENT)
Dept: HEMATOLOGY/ONCOLOGY | Facility: CLINIC | Age: 74
End: 2019-02-14

## 2019-02-14 NOTE — TELEPHONE ENCOUNTER
----- Message from Penny Cohen sent at 2/14/2019  8:48 AM CST -----  Contact: pt son  Pt son called to speak with nurse wen have some questions about pt treatment   Callback#[phone]  Thank You

## 2019-02-15 ENCOUNTER — DOCUMENTATION ONLY (OUTPATIENT)
Dept: RADIATION ONCOLOGY | Facility: CLINIC | Age: 74
End: 2019-02-15

## 2019-02-15 PROCEDURE — 77014 HC CT GUIDANCE RADIATION THERAPY FLDS PLACEMENT: CPT | Mod: TC,HCNC | Performed by: RADIOLOGY

## 2019-02-15 PROCEDURE — 77386 HC IMRT, COMPLEX: CPT | Mod: HCNC | Performed by: RADIOLOGY

## 2019-02-15 NOTE — PLAN OF CARE
Problem: Adult Inpatient Plan of Care  Goal: Plan of Care Review  Outcome: Ongoing (interventions implemented as appropriate)  Day 3 of outpatient SBRT to the right lung. Denies headache and N&V. Reports reflux now and prior to treatment. Dry cough present.

## 2019-02-18 ENCOUNTER — TELEPHONE (OUTPATIENT)
Dept: HEMATOLOGY/ONCOLOGY | Facility: CLINIC | Age: 74
End: 2019-02-18

## 2019-02-18 PROCEDURE — 77386 HC IMRT, COMPLEX: CPT | Mod: HCNC | Performed by: RADIOLOGY

## 2019-02-18 PROCEDURE — 77014 HC CT GUIDANCE RADIATION THERAPY FLDS PLACEMENT: CPT | Mod: TC,HCNC | Performed by: RADIOLOGY

## 2019-02-18 NOTE — TELEPHONE ENCOUNTER
Spoke with son. He is calling to verify petscan is needed next week for dr sullivan, as dr mckeon informed patient last week she shouldn't do a petscan until a little while after completion of RT. Son would like dr sullivan to make the decision of what she would like done and when.  MRI brain is scheduled as well for next week, and Dr. Mckeon is agreeable to having this scan done next week.  Nurse informed son she would contact him back after speaking with dr sullivan. He voiced understanding.    Of note, patient is having 7 days of RT, instead of the original 8 sessions.    Message routed to dr sullivan

## 2019-02-18 NOTE — TELEPHONE ENCOUNTER
----- Message from Alina Sigala sent at 2/18/2019 10:34 AM CST -----  Contact: Basil (son)  Has a few questions in regards to pt's scheduled appts.    Contact:: [phone]

## 2019-02-19 ENCOUNTER — TELEPHONE (OUTPATIENT)
Dept: HEMATOLOGY/ONCOLOGY | Facility: CLINIC | Age: 74
End: 2019-02-19

## 2019-02-19 PROCEDURE — 77336 RADIATION PHYSICS CONSULT: CPT | Mod: HCNC | Performed by: RADIOLOGY

## 2019-02-19 PROCEDURE — 77386 HC IMRT, COMPLEX: CPT | Mod: HCNC | Performed by: RADIOLOGY

## 2019-02-19 PROCEDURE — 77014 HC CT GUIDANCE RADIATION THERAPY FLDS PLACEMENT: CPT | Mod: TC,HCNC | Performed by: RADIOLOGY

## 2019-02-19 NOTE — TELEPHONE ENCOUNTER
----- Message from Rosa M Alejandra sent at 2/19/2019 11:14 AM CST -----  Contact: pt son Basil [phone]  would like to be called back regarding Pt care, also wants to know when she will resume her Taceva, and her next injection,( Wants a call from Zandra)      can be reached at [phone]

## 2019-02-19 NOTE — TELEPHONE ENCOUNTER
Reviewed all appointments with son.  He voiced understanding.  He understands dr sullivan will review her labs on the 28th---and let her know if she can resume her tarceva.   xgeva will be received that day, if her counts are sufficient.

## 2019-02-19 NOTE — TELEPHONE ENCOUNTER
Left vm for patient's son informing him petscan will be canceled for next week---and will be rescheduled 3 months post completion of RT.

## 2019-02-20 PROCEDURE — 77014 HC CT GUIDANCE RADIATION THERAPY FLDS PLACEMENT: CPT | Mod: TC,HCNC | Performed by: RADIOLOGY

## 2019-02-20 PROCEDURE — 77386 HC IMRT, COMPLEX: CPT | Mod: HCNC | Performed by: RADIOLOGY

## 2019-02-21 PROCEDURE — 77386 HC IMRT, COMPLEX: CPT | Mod: HCNC | Performed by: RADIOLOGY

## 2019-02-21 PROCEDURE — 77014 HC CT GUIDANCE RADIATION THERAPY FLDS PLACEMENT: CPT | Mod: TC,HCNC | Performed by: RADIOLOGY

## 2019-02-22 ENCOUNTER — DOCUMENTATION ONLY (OUTPATIENT)
Dept: RADIATION ONCOLOGY | Facility: CLINIC | Age: 74
End: 2019-02-22

## 2019-02-22 NOTE — PLAN OF CARE
Problem: Adult Inpatient Plan of Care  Goal: Plan of Care Review  Outcome: Outcome(s) achieved Date Met: 02/22/19  Last day of outpatient XRT to the lung. Follow up call and appointment to be made per MD request.

## 2019-02-26 ENCOUNTER — HOSPITAL ENCOUNTER (OUTPATIENT)
Dept: RADIOLOGY | Facility: HOSPITAL | Age: 74
Discharge: HOME | End: 2019-02-26
Attending: INTERNAL MEDICINE
Payer: MEDICARE

## 2019-02-26 ENCOUNTER — LAB VISIT (OUTPATIENT)
Dept: LAB | Facility: HOSPITAL | Age: 74
End: 2019-02-26
Attending: INTERNAL MEDICINE
Payer: MEDICARE

## 2019-02-26 DIAGNOSIS — C34.31 MALIGNANT NEOPLASM OF LOWER LOBE OF RIGHT LUNG: ICD-10-CM

## 2019-02-26 LAB
ALBUMIN SERPL BCP-MCNC: 2.8 G/DL
ALP SERPL-CCNC: 64 U/L
ALT SERPL W/O P-5'-P-CCNC: 31 U/L
ANION GAP SERPL CALC-SCNC: 6 MMOL/L
AST SERPL-CCNC: 36 U/L
BILIRUB SERPL-MCNC: 0.6 MG/DL
BUN SERPL-MCNC: 20 MG/DL
CALCIUM SERPL-MCNC: 8.3 MG/DL
CHLORIDE SERPL-SCNC: 117 MMOL/L
CO2 SERPL-SCNC: 20 MMOL/L
CREAT SERPL-MCNC: 1.4 MG/DL
ERYTHROCYTE [DISTWIDTH] IN BLOOD BY AUTOMATED COUNT: 12.6 %
EST. GFR  (AFRICAN AMERICAN): 42.7 ML/MIN/1.73 M^2
EST. GFR  (NON AFRICAN AMERICAN): 37 ML/MIN/1.73 M^2
GLUCOSE SERPL-MCNC: 69 MG/DL
HCT VFR BLD AUTO: 36.1 %
HGB BLD-MCNC: 10.7 G/DL
IMM GRANULOCYTES # BLD AUTO: 0.03 K/UL
MCH RBC QN AUTO: 28.5 PG
MCHC RBC AUTO-ENTMCNC: 29.6 G/DL
MCV RBC AUTO: 96 FL
NEUTROPHILS # BLD AUTO: 2.9 K/UL
PLATELET # BLD AUTO: 200 K/UL
PMV BLD AUTO: 9.8 FL
POTASSIUM SERPL-SCNC: 4 MMOL/L
PROT SERPL-MCNC: 5.6 G/DL
RBC # BLD AUTO: 3.75 M/UL
SODIUM SERPL-SCNC: 143 MMOL/L
WBC # BLD AUTO: 4.69 K/UL

## 2019-02-26 PROCEDURE — 70553 MRI BRAIN W WO CONTRAST: ICD-10-PCS | Mod: 26,HCNC,, | Performed by: RADIOLOGY

## 2019-02-26 PROCEDURE — 36415 COLL VENOUS BLD VENIPUNCTURE: CPT | Mod: HCNC

## 2019-02-26 PROCEDURE — 70553 MRI BRAIN STEM W/O & W/DYE: CPT | Mod: 26,HCNC,, | Performed by: RADIOLOGY

## 2019-02-26 PROCEDURE — 25500020 PHARM REV CODE 255: Mod: HCNC | Performed by: INTERNAL MEDICINE

## 2019-02-26 PROCEDURE — 70553 MRI BRAIN STEM W/O & W/DYE: CPT | Mod: TC,HCNC

## 2019-02-26 PROCEDURE — A9585 GADOBUTROL INJECTION: HCPCS | Mod: HCNC | Performed by: INTERNAL MEDICINE

## 2019-02-26 PROCEDURE — 80053 COMPREHEN METABOLIC PANEL: CPT | Mod: HCNC

## 2019-02-26 PROCEDURE — 85027 COMPLETE CBC AUTOMATED: CPT | Mod: HCNC

## 2019-02-26 RX ORDER — GADOBUTROL 604.72 MG/ML
7 INJECTION INTRAVENOUS
Status: COMPLETED | OUTPATIENT
Start: 2019-02-26 | End: 2019-02-26

## 2019-02-26 RX ADMIN — GADOBUTROL 7 ML: 604.72 INJECTION INTRAVENOUS at 03:02

## 2019-02-28 ENCOUNTER — OFFICE VISIT (OUTPATIENT)
Dept: HEMATOLOGY/ONCOLOGY | Facility: CLINIC | Age: 74
End: 2019-02-28
Payer: MEDICARE

## 2019-02-28 VITALS
BODY MASS INDEX: 23.06 KG/M2 | DIASTOLIC BLOOD PRESSURE: 71 MMHG | WEIGHT: 143.5 LBS | TEMPERATURE: 98 F | RESPIRATION RATE: 20 BRPM | HEART RATE: 65 BPM | HEIGHT: 66 IN | OXYGEN SATURATION: 100 % | SYSTOLIC BLOOD PRESSURE: 119 MMHG

## 2019-02-28 DIAGNOSIS — C34.31 MALIGNANT NEOPLASM OF LOWER LOBE OF RIGHT LUNG: Primary | ICD-10-CM

## 2019-02-28 DIAGNOSIS — C77.1 SECONDARY MALIGNANT NEOPLASM OF INTRATHORACIC LYMPH NODES: ICD-10-CM

## 2019-02-28 DIAGNOSIS — C79.51 SECONDARY CANCER OF BONE: ICD-10-CM

## 2019-02-28 PROCEDURE — 99215 OFFICE O/P EST HI 40 MIN: CPT | Mod: HCNC,S$GLB,, | Performed by: INTERNAL MEDICINE

## 2019-02-28 PROCEDURE — 99999 PR PBB SHADOW E&M-EST. PATIENT-LVL IV: ICD-10-PCS | Mod: PBBFAC,HCNC,, | Performed by: INTERNAL MEDICINE

## 2019-02-28 PROCEDURE — 99999 PR PBB SHADOW E&M-EST. PATIENT-LVL IV: CPT | Mod: PBBFAC,HCNC,, | Performed by: INTERNAL MEDICINE

## 2019-02-28 PROCEDURE — 3074F PR MOST RECENT SYSTOLIC BLOOD PRESSURE < 130 MM HG: ICD-10-PCS | Mod: HCNC,CPTII,S$GLB, | Performed by: INTERNAL MEDICINE

## 2019-02-28 PROCEDURE — 1101F PR PT FALLS ASSESS DOC 0-1 FALLS W/OUT INJ PAST YR: ICD-10-PCS | Mod: HCNC,CPTII,S$GLB, | Performed by: INTERNAL MEDICINE

## 2019-02-28 PROCEDURE — 3078F DIAST BP <80 MM HG: CPT | Mod: HCNC,CPTII,S$GLB, | Performed by: INTERNAL MEDICINE

## 2019-02-28 PROCEDURE — 99215 PR OFFICE/OUTPT VISIT, EST, LEVL V, 40-54 MIN: ICD-10-PCS | Mod: HCNC,S$GLB,, | Performed by: INTERNAL MEDICINE

## 2019-02-28 PROCEDURE — 1101F PT FALLS ASSESS-DOCD LE1/YR: CPT | Mod: HCNC,CPTII,S$GLB, | Performed by: INTERNAL MEDICINE

## 2019-02-28 PROCEDURE — 3074F SYST BP LT 130 MM HG: CPT | Mod: HCNC,CPTII,S$GLB, | Performed by: INTERNAL MEDICINE

## 2019-02-28 PROCEDURE — 99499 UNLISTED E&M SERVICE: CPT | Mod: HCNC,S$GLB,, | Performed by: INTERNAL MEDICINE

## 2019-02-28 PROCEDURE — 99499 RISK ADDL DX/OHS AUDIT: ICD-10-PCS | Mod: HCNC,S$GLB,, | Performed by: INTERNAL MEDICINE

## 2019-02-28 PROCEDURE — 3078F PR MOST RECENT DIASTOLIC BLOOD PRESSURE < 80 MM HG: ICD-10-PCS | Mod: HCNC,CPTII,S$GLB, | Performed by: INTERNAL MEDICINE

## 2019-02-28 NOTE — PROGRESS NOTES
"Subjective:       Patient ID: Naty St is a 73 y.o. female.    Chief Complaint: Malignant neoplasm of lower lobe of right lung  Oncologic History:  Ms. Naty St was admitted to the hospital between 01/25/2016 and 01/28/2016. She has a history of pancreatic cancer 17 years ago, breast cancer and meningioma, presented to the hospital complaining of loss of consciousness. The patient apparently was in her normal state of health and she stood up to go to the bathroom and fell to the floor. The patient's daughter helped the mother up in the bathroom and noted that her mother's upper extremities were shaking and eye rolling. No reports of bowel or bladder incontinence, tongue biting or rolling. The second episode lasted about four minutes and she was extremely lethargic following that. Apparently, the patient had a meningioma resection done in the past; however, recently, underwent neurosurgical resection with Dr. 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    Additional immunohistochemical stains were performed which revealed the tumor cells to be are positive for TTF1 and negative for ER and GCDFP. The morphology and TTF1 positivity are most consistent with lung primary.    Of note, imaging scan at the end of January 2016 revealed a mass in the lung at 2 cm in the medial aspect of the apical segment of the right upper lobe abutting the mediastinum at the level of the azygous vein and abutting and possibly encasing the segmental bronchi and vessels of the apical segment of the right upper lobe and no pleural fluid was present. Also, there is an enlarged right paratracheal lymph node. No evidence of any metastatic disease at the pancreatic site with postoperative changes post Whipple disease  Her PET Scan from 2/15/16 reveal "Hypermetabolic mass in the right lung apex consistent with a " "primary malignancy. Hypermetabolic mediastinal lymph nodes consistent with metastatic disease. Right sacral hypermetabolic lesion consistent with metastatic disease, noting additional mildly sclerotic lesions in multiple vertebral bodies which do not demonstrate abnormal hypermetabolism  She underwent IR bone biopsy which revealed metastatic adenocarcinoma of lung origin. 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  She has completed focal RT to brain lesions in March 2016.    PET scan from 6/6/16 shows "Dramatic almost complete response to therapy."  Lovenox started after US lower ext 6/7/16 shows Acute complete occlusion of one of the left posterior tibial vein.Remote partial thrombus of the proximal left superficial femoral vein.  She is on Tarceva.   12/6/16 PET scan reveals "Stable right upper lobe lesion and right hilar lymph node. No new lesions identified. Trace left pleural effusion".  1/27/17 Renal u/s "Medical renal disease. Nonobstructive right nephrolithiasis"  1/31/17 PET - "Right upper lobe nodule and right hilar lymph node similar and very low grade activity.  There is no definite evidence of recurrence."   11/9/17 PET "In this patient with history of lung cancer, there is interval increase in size and hypermetabolism of a right upper lobe lung lesion concerning for recurrent disease. Additionally, there is interval appearance of a hypermetabolic paratracheal lymph node suspicious for metastatic disease"                 She progressed on Tarceva She underwent EBUS on 12/5/17. Pathology revealed adenocarcinoma but T790m could not be done as quantity was not insufficient. Her PET scan from 1/30/18 revealed The patient's previously identified abnormal lesions is slightly greater uptake of FDG on today's study compared with prior exam. These findings are concerning for progression of disease"      She was hospitalized between 4/9/18-4/16/18. Her hospital course was as follows "Patient was admitted to " "hemonc service on 4/9 with acute hypoxic respiratory failure. She was requiring 4 L of nc on admission. She was started on moxi for CAP. She received one dose of ceftriaxone in the ED. On 2nd day of admission she spiked a fever. Her Hb was ~7 g/dl so she was transfused 1 unit of PRBCs. Over night she developed worsening of her symptoms with increased O2 requirements to 15 L HFNC. Her CXR showed worsening congestion. There was a concern of TRALI vs TACO. New 2D echo with diastolic dysfunction. She was given IV lasix pushes as needed and was started on dexamethasone.  Her abx was escalated to vanc and cefepime. She improved with diuresis and steroids. Pulmonary were consulted. Her O2 requirements continued to improve. On 4/15 she was switched to Augmentin. PT recommended discharging her to SNF but the patient refused and she wanted to go home with home health. She qualified for home oxygen so she was discharged on 3 L nc while at rest and 6 while active. Augmentin and dexamethasone were discontinued on discharge"     She was readmitted from 4/27 to 5/3/18 with who presented to the ED with a complaint of fatigue and worsening DELA CRUZ. Pt diagnosed PNA 4/9 and was discharged on 4/16 on 3L oxygen. She was initially placed on cefepime for 3 days followed by an add'l 2 days of Augmentin. Pulm was consulted with assistance as her oxygen requirements were increasing. She was placed on oral steroids, then trailed on 80mg TID solumedrol for 2 days and will be discharged on a prednisone taper. She was also diuresed with 2 days of 40mg IV lasix with appropriate UOP resulting, improvement on repeat CXR. She was medically stable and appropriate for DC home with home health on 1L continuous O2. She will be seen for close f/u and may be able to come off oxygen at that time.      She discontinued Tagrisso in April 2018. Restaging scans from 6/4/18 revealed "Stable appearance of a spiculated right upper lobe pulmonary lesion.  Additional " "irregular focus superior to the lesion in the right lung apex is stable and may represent scar or additional lesion; although this was not hypermetabolic on prior PET-CT.  No new pulmonary lesions. 1.3 cm subcarinal lymph node, not hypermetabolic on prior PET-CT. Stable postsurgical changes of a Whipple procedure. Bilateral nonobstructing nephrolithiasis.  Stable sclerotic focus in the T5 vertebral body, possibly a bone island as this was not hypermetabolic on prior PET-CT. Small hiatal hernia. RECIST SUMMARY: Date of prior examination for comparison 01/30/2018 Lesion 1: Right upper lobe 1.3 cm Series 2 image 31 prior measurement 1.3 cm"  Bone scan also from 6/4/18 revealed no evidence of mets.     Her PET scan from 7/11/18 revealed "Right suprahilar lesion SUV max 3.76, previously 6.86. Pretracheal lymph node SUV max 2.55, previously 3.79. There is physiologic intracranial, head, and neck activity.  There is muscle activation.  There is vocal cord activation.  There is physiologic liver, spleen, GI and  activity.  There is a hiatal hernia.  Pelvic organs show nothing unusual.  No bone lesions are seen.  There are areas of benign appearing lung scar"  She notes fatigue.  She denies any nausea, vomiting, diarrhea, constipation, abdominal pain, weight loss or loss of appetite, chest pain, shortness of breath, leg swelling, fatigue, pain, headache, dizziness, or mood changes. Her ECOG PS is 2. She is accompanied by her  and son     She has been on Tarceva since 6/5/18      HPI She comes to review labs and scans on Tarceva. She has now completed SBRT to her right lung lesions  MRI brain on 2/26/19 reveals "Stable postoperative changes of prior left frontal mass resection.  No new nodule or masslike enhancement at the surgical cavity.  No new enhancing lesions identified elsewhere. 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 New mucosal thickening and fluid in the paranasal sinuses, clinical correlation " "for acute sinusitis"  She feels well and denies any new issues      Review of Systems   Constitutional: Negative for appetite change, fatigue and unexpected weight change.   HENT: Negative for mouth sores.    Eyes: Negative for visual disturbance.   Respiratory: Negative for cough and shortness of breath.    Cardiovascular: Negative for chest pain.   Gastrointestinal: Negative for abdominal pain and diarrhea.   Genitourinary: Negative for frequency.   Musculoskeletal: Negative for back pain.   Skin: Negative for rash.   Neurological: Negative for headaches.   Hematological: Negative for adenopathy.   Psychiatric/Behavioral: The patient is not nervous/anxious.    All other systems reviewed and are negative.      Objective:      Physical Exam   Constitutional: She is oriented to person, place, and time. She appears well-developed and well-nourished.   HENT:   Mouth/Throat: No oropharyngeal exudate.   Cardiovascular: Normal rate and normal heart sounds.   Pulmonary/Chest: Effort normal and breath sounds normal. She has no wheezes.   Abdominal: Soft. Bowel sounds are normal. There is no tenderness.   Musculoskeletal: She exhibits no edema or tenderness.   Lymphadenopathy:     She has no cervical adenopathy.   Neurological: She is alert and oriented to person, place, and time. Coordination normal.   Skin: Skin is warm and dry. No rash noted.   Psychiatric: She has a normal mood and affect. Judgment and thought content normal.   Vitals reviewed.        LABS:  WBC   Date Value Ref Range Status   02/26/2019 4.69 3.90 - 12.70 K/uL Final     Hemoglobin   Date Value Ref Range Status   02/26/2019 10.7 (L) 12.0 - 16.0 g/dL Final     POC Hematocrit   Date Value Ref Range Status   01/12/2016 25 (L) 36 - 54 %PCV Final     Hematocrit   Date Value Ref Range Status   02/26/2019 36.1 (L) 37.0 - 48.5 % Final     Platelets   Date Value Ref Range Status   02/26/2019 200 150 - 350 K/uL Final     Gran # (ANC)   Date Value Ref Range Status "   02/26/2019 2.9 1.8 - 7.7 K/uL Final     Comment:     The ANC is based on a white cell differential from an   automated cell counter. It has not been microscopically   reviewed for the presence of abnormal cells. Clinical   correlation is required.         Chemistry        Component Value Date/Time     02/26/2019 1043    K 4.0 02/26/2019 1043     (H) 02/26/2019 1043    CO2 20 (L) 02/26/2019 1043    BUN 20 02/26/2019 1043    CREATININE 1.4 02/26/2019 1043    GLU 69 (L) 02/26/2019 1043        Component Value Date/Time    CALCIUM 8.3 (L) 02/26/2019 1043    ALKPHOS 64 02/26/2019 1043    AST 36 02/26/2019 1043    ALT 31 02/26/2019 1043    BILITOT 0.6 02/26/2019 1043    ESTGFRAFRICA 42.7 (A) 02/26/2019 1043    EGFRNONAA 37.0 (A) 02/26/2019 1043          Assessment:       1. Malignant neoplasm of lower lobe of right lung    2. 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 She is doing very well and will proceed with Xgeva and will stay on tarceva and will return in 6 weeks with labs and for Xgeva. Next POET scan will be in May 2019    Above care plan was discussed with patient and accompanying son and all questions were addressed to their satisfaction

## 2019-03-04 ENCOUNTER — TELEPHONE (OUTPATIENT)
Dept: HEMATOLOGY/ONCOLOGY | Facility: CLINIC | Age: 74
End: 2019-03-04

## 2019-03-04 NOTE — TELEPHONE ENCOUNTER
Spoke with patient's son. He is calling to state xgeva wasn't administered last week, as her approval .  xgeva is in process of being approved---and son is wanting to get an update--and to schedule it for her asap once authorized.     Nurse informed son she would contact him back after speaking with pre-service.  He thanked nurse.      Message routed to pre-service and [facility] (please let us know once xgeva is approved)

## 2019-03-04 NOTE — TELEPHONE ENCOUNTER
----- Message from Dorinda Giles sent at 3/4/2019 10:08 AM CST -----  Contact: Basil (pts Son)   Pt following up after seeing the MD, has questions about her insurance and if her injection is covered. Basil has been trying to get the appt scheduled as soon as possible.     Contact Basil at [phone]

## 2019-03-06 ENCOUNTER — INFUSION (OUTPATIENT)
Dept: INFUSION THERAPY | Facility: HOSPITAL | Age: 74
End: 2019-03-06
Attending: INTERNAL MEDICINE
Payer: MEDICARE

## 2019-03-06 DIAGNOSIS — C34.31 MALIGNANT NEOPLASM OF LOWER LOBE OF RIGHT LUNG: Primary | ICD-10-CM

## 2019-03-06 PROCEDURE — 96372 THER/PROPH/DIAG INJ SC/IM: CPT | Mod: HCNC

## 2019-03-06 PROCEDURE — 63600175 PHARM REV CODE 636 W HCPCS: Mod: HCNC,JG | Performed by: INTERNAL MEDICINE

## 2019-03-06 RX ADMIN — DENOSUMAB 120 MG: 120 INJECTION SUBCUTANEOUS at 02:03

## 2019-03-14 RX ORDER — AMLODIPINE BESYLATE 5 MG/1
TABLET ORAL
Qty: 30 TABLET | Refills: 6 | Status: SHIPPED | OUTPATIENT
Start: 2019-03-14 | End: 2019-07-10

## 2019-03-16 DIAGNOSIS — C34.31 MALIGNANT NEOPLASM OF LOWER LOBE OF RIGHT LUNG: ICD-10-CM

## 2019-03-18 ENCOUNTER — DOCUMENTATION ONLY (OUTPATIENT)
Dept: RADIATION ONCOLOGY | Facility: CLINIC | Age: 74
End: 2019-03-18

## 2019-03-18 RX ORDER — ERLOTINIB HYDROCHLORIDE 150 MG/1
TABLET ORAL
Qty: 30 TABLET | Refills: 0 | Status: SHIPPED | OUTPATIENT
Start: 2019-03-18 | End: 2019-04-13 | Stop reason: SDUPTHER

## 2019-03-22 ENCOUNTER — TELEPHONE (OUTPATIENT)
Dept: HEMATOLOGY/ONCOLOGY | Facility: CLINIC | Age: 74
End: 2019-03-22

## 2019-03-22 NOTE — TELEPHONE ENCOUNTER
----- Message from Dorinda Giles sent at 3/22/2019  8:11 AM CDT -----  Contact: Basil (pts Son)   Pt's Son called about a burning sensation, or urinary track infection when the pt urinates, and he really needs to see if she could come in to be seen. Please contact him at [phone]

## 2019-03-22 NOTE — TELEPHONE ENCOUNTER
Son is calling with symptoms of UTI--burning with urination. He is not aware of fever/chills.     Nurse advised son to contact her PCP, as she needs UA /culture performed---and to be started on abx if necessary, especially with her past history. Son asked nurse to send message to dr chery's office for him.    Message routed to dr chery's staff

## 2019-04-11 ENCOUNTER — LAB VISIT (OUTPATIENT)
Dept: LAB | Facility: HOSPITAL | Age: 74
End: 2019-04-11
Attending: INTERNAL MEDICINE
Payer: MEDICARE

## 2019-04-11 ENCOUNTER — OFFICE VISIT (OUTPATIENT)
Dept: HEMATOLOGY/ONCOLOGY | Facility: CLINIC | Age: 74
End: 2019-04-11
Payer: MEDICARE

## 2019-04-11 ENCOUNTER — INFUSION (OUTPATIENT)
Dept: INFUSION THERAPY | Facility: HOSPITAL | Age: 74
End: 2019-04-11
Attending: INTERNAL MEDICINE
Payer: MEDICARE

## 2019-04-11 VITALS
SYSTOLIC BLOOD PRESSURE: 118 MMHG | DIASTOLIC BLOOD PRESSURE: 58 MMHG | WEIGHT: 144 LBS | RESPIRATION RATE: 18 BRPM | BODY MASS INDEX: 23.14 KG/M2 | HEIGHT: 66 IN | HEART RATE: 62 BPM | TEMPERATURE: 97 F

## 2019-04-11 VITALS
BODY MASS INDEX: 23.2 KG/M2 | SYSTOLIC BLOOD PRESSURE: 114 MMHG | OXYGEN SATURATION: 98 % | HEIGHT: 66 IN | DIASTOLIC BLOOD PRESSURE: 60 MMHG | TEMPERATURE: 98 F | HEART RATE: 58 BPM | RESPIRATION RATE: 17 BRPM | WEIGHT: 144.38 LBS

## 2019-04-11 DIAGNOSIS — C79.51 SECONDARY CANCER OF BONE: ICD-10-CM

## 2019-04-11 DIAGNOSIS — C34.31 MALIGNANT NEOPLASM OF LOWER LOBE OF RIGHT LUNG: ICD-10-CM

## 2019-04-11 DIAGNOSIS — C79.31 BRAIN METASTASES: ICD-10-CM

## 2019-04-11 DIAGNOSIS — N18.30 STAGE 3 CHRONIC KIDNEY DISEASE: ICD-10-CM

## 2019-04-11 DIAGNOSIS — R55 PRE-SYNCOPE: ICD-10-CM

## 2019-04-11 DIAGNOSIS — C34.91 ADENOCARCINOMA OF LUNG, RIGHT: Primary | ICD-10-CM

## 2019-04-11 LAB
ALBUMIN SERPL BCP-MCNC: 3.1 G/DL (ref 3.5–5.2)
ALP SERPL-CCNC: 97 U/L (ref 55–135)
ALT SERPL W/O P-5'-P-CCNC: 42 U/L (ref 10–44)
ANION GAP SERPL CALC-SCNC: 6 MMOL/L (ref 8–16)
AST SERPL-CCNC: 29 U/L (ref 10–40)
BILIRUB SERPL-MCNC: 1.1 MG/DL (ref 0.1–1)
BUN SERPL-MCNC: 38 MG/DL (ref 8–23)
CALCIUM SERPL-MCNC: 8.8 MG/DL (ref 8.7–10.5)
CHLORIDE SERPL-SCNC: 115 MMOL/L (ref 95–110)
CO2 SERPL-SCNC: 20 MMOL/L (ref 23–29)
CREAT SERPL-MCNC: 1.9 MG/DL (ref 0.5–1.4)
ERYTHROCYTE [DISTWIDTH] IN BLOOD BY AUTOMATED COUNT: 13.7 % (ref 11.5–14.5)
EST. GFR  (AFRICAN AMERICAN): 29.5 ML/MIN/1.73 M^2
EST. GFR  (NON AFRICAN AMERICAN): 25.6 ML/MIN/1.73 M^2
GLUCOSE SERPL-MCNC: 133 MG/DL (ref 70–110)
HCT VFR BLD AUTO: 34.4 % (ref 37–48.5)
HGB BLD-MCNC: 10.1 G/DL (ref 12–16)
IMM GRANULOCYTES # BLD AUTO: 0.04 K/UL (ref 0–0.04)
MCH RBC QN AUTO: 28.1 PG (ref 27–31)
MCHC RBC AUTO-ENTMCNC: 29.4 G/DL (ref 32–36)
MCV RBC AUTO: 96 FL (ref 82–98)
NEUTROPHILS # BLD AUTO: 4 K/UL (ref 1.8–7.7)
PLATELET # BLD AUTO: 238 K/UL (ref 150–350)
PMV BLD AUTO: 10.2 FL (ref 9.2–12.9)
POTASSIUM SERPL-SCNC: 4.2 MMOL/L (ref 3.5–5.1)
PROT SERPL-MCNC: 6.1 G/DL (ref 6–8.4)
RBC # BLD AUTO: 3.59 M/UL (ref 4–5.4)
SODIUM SERPL-SCNC: 141 MMOL/L (ref 136–145)
WBC # BLD AUTO: 6.29 K/UL (ref 3.9–12.7)

## 2019-04-11 PROCEDURE — 63600175 PHARM REV CODE 636 W HCPCS: Mod: HCNC,JG | Performed by: INTERNAL MEDICINE

## 2019-04-11 PROCEDURE — 3078F DIAST BP <80 MM HG: CPT | Mod: HCNC,CPTII,S$GLB, | Performed by: INTERNAL MEDICINE

## 2019-04-11 PROCEDURE — 96360 HYDRATION IV INFUSION INIT: CPT | Mod: HCNC

## 2019-04-11 PROCEDURE — 96372 THER/PROPH/DIAG INJ SC/IM: CPT | Mod: HCNC,59

## 2019-04-11 PROCEDURE — 99499 UNLISTED E&M SERVICE: CPT | Mod: HCNC,S$GLB,, | Performed by: INTERNAL MEDICINE

## 2019-04-11 PROCEDURE — 3288F FALL RISK ASSESSMENT DOCD: CPT | Mod: HCNC,CPTII,S$GLB, | Performed by: INTERNAL MEDICINE

## 2019-04-11 PROCEDURE — 85027 COMPLETE CBC AUTOMATED: CPT | Mod: HCNC

## 2019-04-11 PROCEDURE — 99215 PR OFFICE/OUTPT VISIT, EST, LEVL V, 40-54 MIN: ICD-10-PCS | Mod: HCNC,S$GLB,, | Performed by: INTERNAL MEDICINE

## 2019-04-11 PROCEDURE — 3074F SYST BP LT 130 MM HG: CPT | Mod: HCNC,CPTII,S$GLB, | Performed by: INTERNAL MEDICINE

## 2019-04-11 PROCEDURE — 3078F PR MOST RECENT DIASTOLIC BLOOD PRESSURE < 80 MM HG: ICD-10-PCS | Mod: HCNC,CPTII,S$GLB, | Performed by: INTERNAL MEDICINE

## 2019-04-11 PROCEDURE — 99999 PR PBB SHADOW E&M-EST. PATIENT-LVL IV: ICD-10-PCS | Mod: PBBFAC,HCNC,, | Performed by: INTERNAL MEDICINE

## 2019-04-11 PROCEDURE — 99499 RISK ADDL DX/OHS AUDIT: ICD-10-PCS | Mod: HCNC,S$GLB,, | Performed by: INTERNAL MEDICINE

## 2019-04-11 PROCEDURE — 96361 HYDRATE IV INFUSION ADD-ON: CPT | Mod: HCNC

## 2019-04-11 PROCEDURE — 1100F PR PT FALLS ASSESS DOC 2+ FALLS/FALL W/INJURY/YR: ICD-10-PCS | Mod: HCNC,CPTII,S$GLB, | Performed by: INTERNAL MEDICINE

## 2019-04-11 PROCEDURE — 36415 COLL VENOUS BLD VENIPUNCTURE: CPT | Mod: HCNC

## 2019-04-11 PROCEDURE — 3074F PR MOST RECENT SYSTOLIC BLOOD PRESSURE < 130 MM HG: ICD-10-PCS | Mod: HCNC,CPTII,S$GLB, | Performed by: INTERNAL MEDICINE

## 2019-04-11 PROCEDURE — 80053 COMPREHEN METABOLIC PANEL: CPT | Mod: HCNC

## 2019-04-11 PROCEDURE — 99999 PR PBB SHADOW E&M-EST. PATIENT-LVL IV: CPT | Mod: PBBFAC,HCNC,, | Performed by: INTERNAL MEDICINE

## 2019-04-11 PROCEDURE — 1100F PTFALLS ASSESS-DOCD GE2>/YR: CPT | Mod: HCNC,CPTII,S$GLB, | Performed by: INTERNAL MEDICINE

## 2019-04-11 PROCEDURE — 25000003 PHARM REV CODE 250: Mod: HCNC | Performed by: INTERNAL MEDICINE

## 2019-04-11 PROCEDURE — 3288F PR FALLS RISK ASSESSMENT DOCUMENTED: ICD-10-PCS | Mod: HCNC,CPTII,S$GLB, | Performed by: INTERNAL MEDICINE

## 2019-04-11 PROCEDURE — 99215 OFFICE O/P EST HI 40 MIN: CPT | Mod: HCNC,S$GLB,, | Performed by: INTERNAL MEDICINE

## 2019-04-11 RX ADMIN — DENOSUMAB 120 MG: 120 INJECTION SUBCUTANEOUS at 11:04

## 2019-04-11 RX ADMIN — SODIUM CHLORIDE 1000 ML: 0.9 INJECTION, SOLUTION INTRAVENOUS at 11:04

## 2019-04-11 NOTE — PLAN OF CARE
Problem: Adult Inpatient Plan of Care  Goal: Plan of Care Review  Outcome: Ongoing (interventions implemented as appropriate)  Pt tolerated 1 liter ivf and xgeva injection without issue, pt has no upcoming appts scheduled at this time, no distress noted upon d/c to home

## 2019-04-11 NOTE — Clinical Note
IVF today. Labs (CBC, CMP in 1 week). Visit with Dr. Vazquez 5/24 or 5/27/19 with labs prior (CBC, CMP). Thanks.

## 2019-04-11 NOTE — PROGRESS NOTES
"Subjective:       Patient ID: Naty St is a 73 y.o. female.    Chief Complaint: Malignant neoplasm of lower lobe of right lung and fall in bathroom over the weekend  Oncologic History:  Ms. Naty St was admitted to the hospital between 01/25/2016 and 01/28/2016. She has a history of pancreatic cancer 17 years ago, breast cancer and meningioma, presented to the hospital complaining of loss of consciousness. The patient apparently was in her normal state of health and she stood up to go to the bathroom and fell to the floor. The patient's daughter helped the mother up in the bathroom and noted that her mother's upper extremities were shaking and eye rolling. No reports of bowel or bladder incontinence, tongue biting or rolling. The second episode lasted about four minutes and she was extremely lethargic following that. Apparently, the patient had a meningioma resection done in the past; however, recently, underwent neurosurgical resection with Dr. 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    Additional immunohistochemical stains were performed which revealed the tumor cells to be are positive for TTF1 and negative for ER and GCDFP. The morphology and TTF1 positivity are most consistent with lung primary.    Of note, imaging scan at the end of January 2016 revealed a mass in the lung at 2 cm in the medial aspect of the apical segment of the right upper lobe abutting the mediastinum at the level of the azygous vein and abutting and possibly encasing the segmental bronchi and vessels of the apical segment of the right upper lobe and no pleural fluid was present. Also, there is an enlarged right paratracheal lymph node. No evidence of any metastatic disease at the pancreatic site with postoperative changes post Whipple disease  Her PET Scan from 2/15/16 reveal "Hypermetabolic mass in the " "right lung apex consistent with a primary malignancy. Hypermetabolic mediastinal lymph nodes consistent with metastatic disease. Right sacral hypermetabolic lesion consistent with metastatic disease, noting additional mildly sclerotic lesions in multiple vertebral bodies which do not demonstrate abnormal hypermetabolism  She underwent IR bone biopsy which revealed metastatic adenocarcinoma of lung origin. 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  She has completed focal RT to brain lesions in March 2016.    PET scan from 6/6/16 shows "Dramatic almost complete response to therapy."  Lovenox started after US lower ext 6/7/16 shows Acute complete occlusion of one of the left posterior tibial vein.Remote partial thrombus of the proximal left superficial femoral vein.  She is on Tarceva.   12/6/16 PET scan reveals "Stable right upper lobe lesion and right hilar lymph node. No new lesions identified. Trace left pleural effusion".  1/27/17 Renal u/s "Medical renal disease. Nonobstructive right nephrolithiasis"  1/31/17 PET - "Right upper lobe nodule and right hilar lymph node similar and very low grade activity.  There is no definite evidence of recurrence."   11/9/17 PET "In this patient with history of lung cancer, there is interval increase in size and hypermetabolism of a right upper lobe lung lesion concerning for recurrent disease. Additionally, there is interval appearance of a hypermetabolic paratracheal lymph node suspicious for metastatic disease"     She progressed on Tarceva She underwent EBUS on 12/5/17. Pathology revealed adenocarcinoma but T790m could not be done as quantity was not insufficient. Her PET scan from 1/30/18 revealed The patient's previously identified abnormal lesions is slightly greater uptake of FDG on today's study compared with prior exam. These findings are concerning for progression of disease"      She was hospitalized between 4/9/18-4/16/18. Her hospital course was as follows "Patient " "was admitted to hemonc service on 4/9 with acute hypoxic respiratory failure. She was requiring 4 L of nc on admission. She was started on moxi for CAP. She received one dose of ceftriaxone in the ED. On 2nd day of admission she spiked a fever. Her Hb was ~7 g/dl so she was transfused 1 unit of PRBCs. Over night she developed worsening of her symptoms with increased O2 requirements to 15 L HFNC. Her CXR showed worsening congestion. There was a concern of TRALI vs TACO. New 2D echo with diastolic dysfunction. She was given IV lasix pushes as needed and was started on dexamethasone.  Her abx was escalated to vanc and cefepime. She improved with diuresis and steroids. Pulmonary were consulted. Her O2 requirements continued to improve. On 4/15 she was switched to Augmentin. PT recommended discharging her to SNF but the patient refused and she wanted to go home with home health. She qualified for home oxygen so she was discharged on 3 L nc while at rest and 6 while active. Augmentin and dexamethasone were discontinued on discharge"     She was readmitted from 4/27 to 5/3/18 with who presented to the ED with a complaint of fatigue and worsening DELA CRUZ. Pt diagnosed PNA 4/9 and was discharged on 4/16 on 3L oxygen. She was initially placed on cefepime for 3 days followed by an add'l 2 days of Augmentin. Pulm was consulted with assistance as her oxygen requirements were increasing. She was placed on oral steroids, then trailed on 80mg TID solumedrol for 2 days and will be discharged on a prednisone taper. She was also diuresed with 2 days of 40mg IV lasix with appropriate UOP resulting, improvement on repeat CXR. She was medically stable and appropriate for DC home with home health on 1L continuous O2. She will be seen for close f/u and may be able to come off oxygen at that time.      She discontinued Tagrisso in April 2018. Restaging scans from 6/4/18 revealed "Stable appearance of a spiculated right upper lobe pulmonary " "lesion.  Additional irregular focus superior to the lesion in the right lung apex is stable and may represent scar or additional lesion; although this was not hypermetabolic on prior PET-CT.  No new pulmonary lesions. 1.3 cm subcarinal lymph node, not hypermetabolic on prior PET-CT. Stable postsurgical changes of a Whipple procedure. Bilateral nonobstructing nephrolithiasis.  Stable sclerotic focus in the T5 vertebral body, possibly a bone island as this was not hypermetabolic on prior PET-CT. Small hiatal hernia. RECIST SUMMARY: Date of prior examination for comparison 01/30/2018 Lesion 1: Right upper lobe 1.3 cm Series 2 image 31 prior measurement 1.3 cm"  Bone scan also from 6/4/18 revealed no evidence of mets.     Her PET scan from 7/11/18 revealed "Right suprahilar lesion SUV max 3.76, previously 6.86. Pretracheal lymph node SUV max 2.55, previously 3.79. There is physiologic intracranial, head, and neck activity.  There is muscle activation.  There is vocal cord activation.  There is physiologic liver, spleen, GI and  activity.  There is a hiatal hernia.  Pelvic organs show nothing unusual.  No bone lesions are seen.  There are areas of benign appearing lung scar"  She notes fatigue.  She denies any nausea, vomiting, diarrhea, constipation, abdominal pain, weight loss or loss of appetite, chest pain, shortness of breath, leg swelling, fatigue, pain, headache, dizziness, or mood changes. Her ECOG PS is 2. She is accompanied by her  and son     She has been on Tarceva since 6/5/18. She has now completed SBRT to her right lung lesions  MRI brain on 2/26/19 reveals "Stable postoperative changes of prior left frontal mass resection.  No new nodule or masslike enhancement at the surgical cavity.  No new enhancing lesions identified elsewhere. 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 New mucosal thickening and fluid in the paranasal sinuses, clinical correlation for acute sinusitis"      HPI   She " feels well and denies any new issues. Weight stable. Good appetite. Presents with her .  -She did fall walking to the bathroom last week, in the middle of the night. No injuries, no LOC, didn't hit head. No incontinence.   -Due for next PET 5/24/19    Review of Systems   Constitutional: Negative for appetite change, fatigue and unexpected weight change.   HENT: Negative for mouth sores and trouble swallowing.    Eyes: Positive for visual disturbance.   Respiratory: Negative for cough and shortness of breath.    Cardiovascular: Negative for chest pain.   Gastrointestinal: Negative for abdominal pain and diarrhea.   Genitourinary: Negative for frequency.   Musculoskeletal: Negative for back pain.   Skin: Negative for rash.   Neurological: Negative for dizziness and headaches.   Hematological: Negative for adenopathy.   Psychiatric/Behavioral: The patient is not nervous/anxious.    All other systems reviewed and are negative.      Objective:      Physical Exam   Constitutional: She is oriented to person, place, and time. She appears well-developed and well-nourished.   HENT:   Mouth/Throat: No oropharyngeal exudate.   Cardiovascular: Normal rate and normal heart sounds.   Pulmonary/Chest: Effort normal and breath sounds normal. She has no wheezes.   Abdominal: Soft. Bowel sounds are normal. There is no tenderness.   Musculoskeletal: She exhibits no edema or tenderness.   Lymphadenopathy:     She has no cervical adenopathy.   Neurological: She is alert and oriented to person, place, and time. Coordination normal.   Skin: Skin is warm and dry. No rash noted.   Psychiatric: She has a normal mood and affect. Judgment and thought content normal.   Vitals reviewed.        LABS:  WBC   Date Value Ref Range Status   04/11/2019 6.29 3.90 - 12.70 K/uL Final     Hemoglobin   Date Value Ref Range Status   04/11/2019 10.1 (L) 12.0 - 16.0 g/dL Final     POC Hematocrit   Date Value Ref Range Status   01/12/2016 25 (L) 36 - 54  %PCV Final     Hematocrit   Date Value Ref Range Status   04/11/2019 34.4 (L) 37.0 - 48.5 % Final     Platelets   Date Value Ref Range Status   04/11/2019 238 150 - 350 K/uL Final     Gran # (ANC)   Date Value Ref Range Status   04/11/2019 4.0 1.8 - 7.7 K/uL Final     Comment:     The ANC is based on a white cell differential from an   automated cell counter. It has not been microscopically   reviewed for the presence of abnormal cells. Clinical   correlation is required.         Chemistry        Component Value Date/Time     04/11/2019 0941    K 4.2 04/11/2019 0941     (H) 04/11/2019 0941    CO2 20 (L) 04/11/2019 0941    BUN 38 (H) 04/11/2019 0941    CREATININE 1.9 (H) 04/11/2019 0941     (H) 04/11/2019 0941        Component Value Date/Time    CALCIUM 8.8 04/11/2019 0941    ALKPHOS 97 04/11/2019 0941    AST 29 04/11/2019 0941    ALT 42 04/11/2019 0941    BILITOT 1.1 (H) 04/11/2019 0941    ESTGFRAFRICA 29.5 (A) 04/11/2019 0941    EGFRNONAA 25.6 (A) 04/11/2019 0941          Assessment:       1. Adenocarcinoma of lung, right    2. Brain metastases    3. Secondary cancer of bone    4. Stage 3 chronic kidney disease    5. Pre-syncope        Plan:        1,2,3. Patient will proceed with Xgeva and will stay on Tarceva   -Next PET scan and visit will be 5/24/19 (12 weeks post-RT)  -Visit with Dr. Vazquez 5/24 or 5/27/19    4,5  -Patient to get IVF today given orthostatic type symptoms & pre-syncopal episode. Repeat CMP and CBC in 1 week.    The following was staffed and discussed with supervising physician Dr. Vazquez.    Dawna Carlin MD  Hematology/Oncology fellow    Above care plan was discussed with patient and accompanying son and all questions were addressed to their satisfaction    Distress Screening Results: Psychosocial Distress screening score of Distress Score: 0 noted and reviewed. No intervention indicated.     STAFF NOTE:  I have personally taken the history and examined this patient and agree  with Dr. Carlin's Note as stated above.

## 2019-04-11 NOTE — PLAN OF CARE
Problem: Adult Inpatient Plan of Care  Goal: Plan of Care Review  Outcome: Ongoing (interventions implemented as appropriate)  Pt here for xgeva injection and 1 liter normal saline over 2 hours, labs, hx, meds, allergies reviewed, pt reclined in chair, warm blanket provided, continue to monitor

## 2019-04-13 DIAGNOSIS — C34.31 MALIGNANT NEOPLASM OF LOWER LOBE OF RIGHT LUNG: ICD-10-CM

## 2019-04-13 RX ORDER — ERLOTINIB HYDROCHLORIDE 150 MG/1
TABLET ORAL
Qty: 30 TABLET | Refills: 0 | Status: SHIPPED | OUTPATIENT
Start: 2019-04-13 | End: 2019-06-11 | Stop reason: SDUPTHER

## 2019-04-18 ENCOUNTER — LAB VISIT (OUTPATIENT)
Dept: LAB | Facility: HOSPITAL | Age: 74
End: 2019-04-18
Attending: INTERNAL MEDICINE
Payer: MEDICARE

## 2019-04-18 DIAGNOSIS — C34.90 MALIGNANT NEOPLASM OF LUNG, UNSPECIFIED LATERALITY, UNSPECIFIED PART OF LUNG: ICD-10-CM

## 2019-04-18 LAB
ALBUMIN SERPL BCP-MCNC: 3.1 G/DL (ref 3.5–5.2)
ALP SERPL-CCNC: 81 U/L (ref 55–135)
ALT SERPL W/O P-5'-P-CCNC: 24 U/L (ref 10–44)
ANION GAP SERPL CALC-SCNC: 8 MMOL/L (ref 8–16)
AST SERPL-CCNC: 28 U/L (ref 10–40)
BILIRUB SERPL-MCNC: 1 MG/DL (ref 0.1–1)
BUN SERPL-MCNC: 32 MG/DL (ref 8–23)
CALCIUM SERPL-MCNC: 9.1 MG/DL (ref 8.7–10.5)
CHLORIDE SERPL-SCNC: 115 MMOL/L (ref 95–110)
CO2 SERPL-SCNC: 16 MMOL/L (ref 23–29)
CREAT SERPL-MCNC: 2 MG/DL (ref 0.5–1.4)
ERYTHROCYTE [DISTWIDTH] IN BLOOD BY AUTOMATED COUNT: 14 % (ref 11.5–14.5)
EST. GFR  (AFRICAN AMERICAN): 27.7 ML/MIN/1.73 M^2
EST. GFR  (NON AFRICAN AMERICAN): 24.1 ML/MIN/1.73 M^2
GLUCOSE SERPL-MCNC: 143 MG/DL (ref 70–110)
HCT VFR BLD AUTO: 35.8 % (ref 37–48.5)
HGB BLD-MCNC: 10.4 G/DL (ref 12–16)
IMM GRANULOCYTES # BLD AUTO: 0.06 K/UL (ref 0–0.04)
MCH RBC QN AUTO: 28.7 PG (ref 27–31)
MCHC RBC AUTO-ENTMCNC: 29.1 G/DL (ref 32–36)
MCV RBC AUTO: 99 FL (ref 82–98)
NEUTROPHILS # BLD AUTO: 4.3 K/UL (ref 1.8–7.7)
PLATELET # BLD AUTO: 201 K/UL (ref 150–350)
PMV BLD AUTO: 10.2 FL (ref 9.2–12.9)
POTASSIUM SERPL-SCNC: 4.6 MMOL/L (ref 3.5–5.1)
PROT SERPL-MCNC: 6 G/DL (ref 6–8.4)
RBC # BLD AUTO: 3.63 M/UL (ref 4–5.4)
SODIUM SERPL-SCNC: 139 MMOL/L (ref 136–145)
WBC # BLD AUTO: 6.38 K/UL (ref 3.9–12.7)

## 2019-04-18 PROCEDURE — 85027 COMPLETE CBC AUTOMATED: CPT | Mod: HCNC

## 2019-04-18 PROCEDURE — 36415 COLL VENOUS BLD VENIPUNCTURE: CPT | Mod: HCNC

## 2019-04-18 PROCEDURE — 80053 COMPREHEN METABOLIC PANEL: CPT | Mod: HCNC

## 2019-04-26 RX ORDER — METOPROLOL SUCCINATE 50 MG/1
TABLET, EXTENDED RELEASE ORAL
Qty: 30 TABLET | Refills: 11 | Status: SHIPPED | OUTPATIENT
Start: 2019-04-26 | End: 2020-05-08

## 2019-05-01 DIAGNOSIS — I27.82 OTHER CHRONIC PULMONARY EMBOLISM WITHOUT ACUTE COR PULMONALE: ICD-10-CM

## 2019-05-01 RX ORDER — APIXABAN 5 MG/1
TABLET, FILM COATED ORAL
Qty: 60 TABLET | Refills: 6 | Status: SHIPPED | OUTPATIENT
Start: 2019-05-01 | End: 2019-12-07 | Stop reason: SDUPTHER

## 2019-05-03 ENCOUNTER — TELEPHONE (OUTPATIENT)
Dept: HEMATOLOGY/ONCOLOGY | Facility: CLINIC | Age: 74
End: 2019-05-03

## 2019-05-03 NOTE — TELEPHONE ENCOUNTER
"Spoke with son again.  He is asking for nurse to send another message to dr marroquin's office, as "I haven't heard anything from them."  Per son, patient denies SOB, dizziness, lightheadedness.    Son understands nurse will send another message over to dr marroquin.    Message routed to dr marroquin's staff  "

## 2019-05-03 NOTE — TELEPHONE ENCOUNTER
----- Message from Dorinda Gilse sent at 5/3/2019  1:12 PM CDT -----  Contact: Basil Balderas son calling about a previous conversation, needs clarity   Please contact her son at [phone] Basil   Thank you

## 2019-05-03 NOTE — TELEPHONE ENCOUNTER
Spoke with patient's son.  He is calling to speak with someone about his mom's cardiac meds---what she is to be taking and what needs to be stopped.    Nurse informed son, shirley, he would need to contact dr marroquin's office, as dr sullivan does not manage her heart issues.  He thanked nurse.      Message routed to dr marroquin's staff

## 2019-05-03 NOTE — TELEPHONE ENCOUNTER
----- Message from Penny Cohen sent at 5/3/2019 10:32 AM CDT -----  Contact: pt son  Pt son called to speak with nurse have some questions about pt medication   Callback#[phone]  Thank You  LOLLY Cohen

## 2019-05-20 DIAGNOSIS — F32.A DEPRESSION, UNSPECIFIED DEPRESSION TYPE: ICD-10-CM

## 2019-05-20 RX ORDER — LORAZEPAM 1 MG/1
TABLET ORAL
Qty: 60 TABLET | Refills: 0 | Status: SHIPPED | OUTPATIENT
Start: 2019-05-20 | End: 2019-07-10 | Stop reason: SDUPTHER

## 2019-05-20 RX ORDER — AMITRIPTYLINE HYDROCHLORIDE 50 MG/1
TABLET, FILM COATED ORAL
Qty: 30 TABLET | Refills: 3 | Status: SHIPPED | OUTPATIENT
Start: 2019-05-20 | End: 2019-07-10 | Stop reason: SDUPTHER

## 2019-05-20 NOTE — TELEPHONE ENCOUNTER
I have done the Rx refill, but pt has not seen me in nearly 2 years. Is she able to come in for a visit?

## 2019-05-24 ENCOUNTER — OFFICE VISIT (OUTPATIENT)
Dept: RADIATION ONCOLOGY | Facility: CLINIC | Age: 74
End: 2019-05-24
Payer: MEDICARE

## 2019-05-24 ENCOUNTER — HOSPITAL ENCOUNTER (OUTPATIENT)
Dept: RADIOLOGY | Facility: HOSPITAL | Age: 74
Discharge: HOME | End: 2019-05-24
Attending: INTERNAL MEDICINE
Payer: MEDICARE

## 2019-05-24 VITALS
DIASTOLIC BLOOD PRESSURE: 56 MMHG | SYSTOLIC BLOOD PRESSURE: 122 MMHG | OXYGEN SATURATION: 97 % | WEIGHT: 144.19 LBS | TEMPERATURE: 98 F | HEART RATE: 70 BPM | BODY MASS INDEX: 24.02 KG/M2 | RESPIRATION RATE: 20 BRPM | HEIGHT: 65 IN

## 2019-05-24 DIAGNOSIS — C77.1 SECONDARY MALIGNANT NEOPLASM OF INTRATHORACIC LYMPH NODES: Primary | ICD-10-CM

## 2019-05-24 DIAGNOSIS — C34.31 MALIGNANT NEOPLASM OF LOWER LOBE OF RIGHT LUNG: ICD-10-CM

## 2019-05-24 LAB — POCT GLUCOSE: 97 MG/DL (ref 70–110)

## 2019-05-24 PROCEDURE — 78815 NM PET CT ROUTINE: ICD-10-PCS | Mod: 26,PS,HCNC, | Performed by: RADIOLOGY

## 2019-05-24 PROCEDURE — 99213 PR OFFICE/OUTPT VISIT, EST, LEVL III, 20-29 MIN: ICD-10-PCS | Mod: HCNC,S$GLB,, | Performed by: RADIOLOGY

## 2019-05-24 PROCEDURE — 3288F PR FALLS RISK ASSESSMENT DOCUMENTED: ICD-10-PCS | Mod: HCNC,CPTII,S$GLB, | Performed by: RADIOLOGY

## 2019-05-24 PROCEDURE — 99999 PR PBB SHADOW E&M-EST. PATIENT-LVL IV: CPT | Mod: PBBFAC,HCNC,, | Performed by: RADIOLOGY

## 2019-05-24 PROCEDURE — 99213 OFFICE O/P EST LOW 20 MIN: CPT | Mod: HCNC,S$GLB,, | Performed by: RADIOLOGY

## 2019-05-24 PROCEDURE — 99999 PR PBB SHADOW E&M-EST. PATIENT-LVL IV: ICD-10-PCS | Mod: PBBFAC,HCNC,, | Performed by: RADIOLOGY

## 2019-05-24 PROCEDURE — 3078F DIAST BP <80 MM HG: CPT | Mod: HCNC,CPTII,S$GLB, | Performed by: RADIOLOGY

## 2019-05-24 PROCEDURE — A9552 F18 FDG: HCPCS | Mod: HCNC

## 2019-05-24 PROCEDURE — 1100F PTFALLS ASSESS-DOCD GE2>/YR: CPT | Mod: HCNC,CPTII,S$GLB, | Performed by: RADIOLOGY

## 2019-05-24 PROCEDURE — 3074F PR MOST RECENT SYSTOLIC BLOOD PRESSURE < 130 MM HG: ICD-10-PCS | Mod: HCNC,CPTII,S$GLB, | Performed by: RADIOLOGY

## 2019-05-24 PROCEDURE — 99499 RISK ADDL DX/OHS AUDIT: ICD-10-PCS | Mod: HCNC,S$GLB,, | Performed by: RADIOLOGY

## 2019-05-24 PROCEDURE — 78815 PET IMAGE W/CT SKULL-THIGH: CPT | Mod: 26,PS,HCNC, | Performed by: RADIOLOGY

## 2019-05-24 PROCEDURE — 3074F SYST BP LT 130 MM HG: CPT | Mod: HCNC,CPTII,S$GLB, | Performed by: RADIOLOGY

## 2019-05-24 PROCEDURE — 3288F FALL RISK ASSESSMENT DOCD: CPT | Mod: HCNC,CPTII,S$GLB, | Performed by: RADIOLOGY

## 2019-05-24 PROCEDURE — 78815 PET IMAGE W/CT SKULL-THIGH: CPT | Mod: TC,HCNC,PS

## 2019-05-24 PROCEDURE — 1100F PR PT FALLS ASSESS DOC 2+ FALLS/FALL W/INJURY/YR: ICD-10-PCS | Mod: HCNC,CPTII,S$GLB, | Performed by: RADIOLOGY

## 2019-05-24 PROCEDURE — 3078F PR MOST RECENT DIASTOLIC BLOOD PRESSURE < 80 MM HG: ICD-10-PCS | Mod: HCNC,CPTII,S$GLB, | Performed by: RADIOLOGY

## 2019-05-24 PROCEDURE — 99499 UNLISTED E&M SERVICE: CPT | Mod: HCNC,S$GLB,, | Performed by: RADIOLOGY

## 2019-05-24 NOTE — PROGRESS NOTES
05/24/2019    Radiation Oncology Follow-Up Visit    Prior Radiation History:       Site  Modality  Energy  Dose/Fx (Gy)  #Fx  Total Dose (Gy)  Start Date  End Date    Rt Paratracheal & Rt Hilar  SBRT 6X  7  8 / 8  49 2/12/2019 2/21/2019        Assessment   This is a 73 y.o. y/o female with Stage IV NSCLC (adeno, EGFR-mutant) diagnosed in 1/2016. This was discovered due to symptomatic Left frontal metastasis that was resected and received 20 Gy in 5 fx to the cavity by Dr. Paris in 3/2016. She has been treated mostly with Tarceva with progressive disease in 2 thoracic (Right paratracheal, Right hilar) nodes in 11/2018 and 1/14/19 PET/CT. She completed SBRT 49 Gy x 7 fx to the two nodes 2/21/19. Her history is also significant for Left breast cancer treated in 1993 and pancreatic cancer s/p Whipple in 1998.      Acute toxicity from treatment (esophagitis) resolved. PET/CT today demonstrates no measurable disease with expected post-radiation inflammatory changes.         Plan   1) F/U with Dr. Vazquez as scheduled and for continuing Tarceva. I will defer imaging to Dr. Vazquez; given appearance on PET, CT Chest with contrast may be sufficient for monitoring until evidence of measurable disease appears.   2) I will see her back in 6 months.        Chief Complaint   Patient presents with    Secondary malignant neoplasm of intrathoracic lymph nodes     follow up        HPI: Mrs. St returns today accompanied by her . Following end of SBRT she reports some esophagitis for ~5-6 weeks that then resolved. She thinks her breathing may be a little worse, but today in particular she is not feeling well overall. She denies chest pain. PET/CT today demonstrates no measurable disease with expected post-radiation inflammatory changes.       Past Medical History:   Diagnosis Date    Anticoagulant long-term use     Blood clot in vein 06/2016    Breast cancer 1994    Cataract     Hypertension     Lung cancer     Lung  cancer metastatic to brain     Pancreatic cancer     Posterior capsular opacification, left eye     Psychiatric problem     Seizures 2016    Stroke     Therapy        Past Surgical History:   Procedure Laterality Date    HOVPYY-ZGXDNP-DI N/A 3/9/2016    Performed by [facility] at [facility] OR 2ND FLR    BONE BIOSPY  3/9/16    BRAIN SURGERY  16    tumor removal 16    BREAST LUMPECTOMY      left    BRONCHOSCOPY N/A 2017    Performed by Kiya Zhang [facility] at [facility] OR 2ND FLR    CATARACT EXTRACTION Bilateral 2004    yag O[facility]    CHOLECYSTECTOMY      COLONOSCOPY N/A 2018    Performed by Jim Raman MD at [facility] ENDO (4TH FLR)    COLONOSCOPY N/A 2015    Performed by Alex Carmona MD at [facility] ENDO (4TH FLR)    COLONOSCOPY N/A 2013    Performed by [facility] at [facility] (4TH FLR)    CRANIOTOMY WITH STEALTH Left 2016    Performed by Salty Ferrera MD at [facility] OR 2ND FLR    cysto and right ureteral stent  12    ecoli      removal of blockage, done throat, then through the liver.    ENDOBRONCHIAL ULTRASOUND (EBUS) N/A 2017    Performed by Ki[facility] Zhang [facility] at [facility] OR 2ND FLR    ESOPHAGOGASTRODUODENOSCOPY (EGD) N/A 3/14/2018    Performed by Alex Carmona MD at [facility] ENDO (4TH FLR)    HYSTERECTOMY  1974    KIDNEY STONE SURGERY  --laser    left eswl  12    OOPHORECTOMY  2012    Laparoscopic BSO, lysis of adhesions, cystoscopy greater than 35mins     WHIPPLE PROCEDURE W/ LAPAROSCOPY         Social History     Tobacco Use    Smoking status: Former Smoker     Packs/day: 0.00     Years: 0.00     Pack years: 0.00     Last attempt to quit: 1961     Years since quittin.6    Smokeless tobacco: Never Used   Substance Use Topics    Alcohol use: No    Drug use: No       Cancer-related family history includes Breast cancer in her mother; Lung cancer in her mother. There is no history of  Ovarian cancer.    Current Outpatient Medications on File Prior to Visit   Medication Sig Dispense Refill    amitriptyline (ELAVIL) 50 MG tablet TAKE ONE TABLET BY MOUTH EVERY EVENING 30 tablet 3    amLODIPine (NORVASC) 5 MG tablet TAKE ONE TABLET BY MOUTH ONCE DAILY 30 tablet 6    atorvastatin (LIPITOR) 40 MG tablet TAKE ONE TABLET BY MOUTH ONCE DAILY 90 tablet 3    clindamycin phosphate 1% (CLINDAGEL) 1 % gel APPLY TOPICALLY TWICE DAILY 60 g 6    CREON CpDR TAKE ONE CAPSULE BY MOUTH THREE TIMES A DAY WITH MEALS 270 capsule 3    denosumab (XGEVA) 120 mg/1.7 mL (70 mg/mL) Soln Inject 120 mg into the skin every 28 days.      dronabinol (MARINOL) 5 MG capsule Take 1 capsule (5 mg total) by mouth 2 (two) times daily before meals. 60 capsule 3    ELIQUIS 5 mg Tab TAKE ONE TABLET BY MOUTH TWICE DAILY 60 tablet 6    levETIRAcetam (KEPPRA) 500 MG Tab TAKE ONE TABLET BY MOUTH TWICE DAILY 180 tablet 1    LORazepam (ATIVAN) 1 MG tablet TAKE ONE TABLET BY MOUTH TWICE DAILY 60 tablet 0    losartan (COZAAR) 50 MG tablet Take 1 tablet (50 mg total) by mouth 2 (two) times daily. 60 tablet 10    metoprolol succinate (TOPROL-XL) 50 MG 24 hr tablet Take 1 tablet (50 mg total) by mouth once daily.      metoprolol succinate (TOPROL-XL) 50 MG 24 hr tablet TAKE ONE TABLET BY MOUTH ONCE DAILY. hold UNTIL your appointment WITH your pcp. heart rate and blood pressure has been normal off OF this me 30 tablet 11    multivitamin (THERAGRAN) per tablet Take 1 tablet by mouth once daily.      MYRBETRIQ 50 mg Tb24 TAKE ONE TABLET BY MOUTH ONCE DAILY 30 tablet 11    TARCEVA 150 mg tablet TAKE 1 TABLET BY MOUTH EVERY DAY 30 tablet 0    polyethylene glycol (GLYCOLAX) 17 gram/dose powder       senna-docusate 8.6-50 mg (SENNA LAXATIVE-STOOL SOFTENER) 8.6-50 mg per tablet Take 1 tablet by mouth once daily.       Current Facility-Administered Medications on File Prior to Visit   Medication Dose Route Frequency Provider Last Rate Last  "Dose    denosumab (XGEVA) solution 120 mg  120 mg Subcutaneous 1 time in Clinic/HOD Karrie Vazquez MD           Review of patient's allergies indicates:   Allergen Reactions    Tobradex [tobramycin-dexamethasone] Swelling    Iodinated contrast- oral and iv dye Hives    Latex Rash and Hives    Phenytoin sodium extended Other (See Comments) and Rash       Review of Systems   Constitutional: Positive for malaise/fatigue and weight loss. Negative for fever.   HENT: Positive for congestion and sore throat. Negative for ear pain.    Eyes: Negative for blurred vision and double vision.   Respiratory: Positive for cough, shortness of breath and wheezing. Negative for hemoptysis.    Cardiovascular: Positive for palpitations and leg swelling. Negative for chest pain.   Gastrointestinal: Positive for constipation, heartburn and nausea. Negative for abdominal pain and diarrhea.   Genitourinary: Negative for dysuria and hematuria.   Musculoskeletal: Negative for falls and joint pain.   Neurological: Positive for dizziness and speech change. Negative for tingling, focal weakness, seizures and headaches.   Psychiatric/Behavioral: Positive for depression and memory loss. The patient is nervous/anxious.         Vital Signs: BP (!) 122/56 (BP Location: Right arm, Patient Position: Sitting)   Pulse 70   Temp 97.9 °F (36.6 °C) (Oral)   Resp 20   Ht 5' 5" (1.651 m)   Wt 65.4 kg (144 lb 3.2 oz)   LMP  (LMP Unknown)   SpO2 97%   BMI 24.00 kg/m²     ECOG Performance Status: 2 - Ambulates, capable of self care only    Physical Exam   Constitutional: She is oriented to person, place, and time and well-developed, well-nourished, and in no distress.   Frail-appearing   HENT:   Head: Normocephalic and atraumatic.   Mouth/Throat: Oropharynx is clear and moist.   Eyes: EOM are normal. No scleral icterus.   Neck: Normal range of motion. Neck supple.   Pulmonary/Chest: No accessory muscle usage. No respiratory distress.   Abdominal: " Soft. Normal appearance. She exhibits no distension.   Musculoskeletal: Normal range of motion. She exhibits no edema.   Lymphadenopathy:     She has no cervical adenopathy.        Right: No supraclavicular adenopathy present.        Left: No supraclavicular adenopathy present.   Neurological: She is alert and oriented to person, place, and time. No cranial nerve deficit. Gait normal.   Skin: Skin is warm and dry.   Psychiatric: Mood, affect and judgment normal.   Vitals reviewed.       Labs:    Imaging: I have personally reviewed the patient's available images and reports and summarized pertinent findings above in HPI.     Pathology: N/A

## 2019-05-27 ENCOUNTER — OFFICE VISIT (OUTPATIENT)
Dept: HEMATOLOGY/ONCOLOGY | Facility: CLINIC | Age: 74
End: 2019-05-27
Payer: MEDICARE

## 2019-05-27 ENCOUNTER — INFUSION (OUTPATIENT)
Dept: INFUSION THERAPY | Facility: HOSPITAL | Age: 74
End: 2019-05-27
Attending: INTERNAL MEDICINE
Payer: MEDICARE

## 2019-05-27 ENCOUNTER — LAB VISIT (OUTPATIENT)
Dept: LAB | Facility: HOSPITAL | Age: 74
End: 2019-05-27
Attending: INTERNAL MEDICINE
Payer: MEDICARE

## 2019-05-27 VITALS
OXYGEN SATURATION: 99 % | DIASTOLIC BLOOD PRESSURE: 63 MMHG | HEIGHT: 66 IN | RESPIRATION RATE: 18 BRPM | TEMPERATURE: 98 F | WEIGHT: 143.94 LBS | HEART RATE: 65 BPM | SYSTOLIC BLOOD PRESSURE: 137 MMHG | BODY MASS INDEX: 23.13 KG/M2

## 2019-05-27 DIAGNOSIS — C34.90 MALIGNANT NEOPLASM OF LUNG, UNSPECIFIED LATERALITY, UNSPECIFIED PART OF LUNG: ICD-10-CM

## 2019-05-27 DIAGNOSIS — C34.31 MALIGNANT NEOPLASM OF LOWER LOBE OF RIGHT LUNG: Primary | ICD-10-CM

## 2019-05-27 DIAGNOSIS — C79.31 BRAIN METASTASES: ICD-10-CM

## 2019-05-27 DIAGNOSIS — C79.51 SECONDARY CANCER OF BONE: ICD-10-CM

## 2019-05-27 LAB
ALBUMIN SERPL BCP-MCNC: 2.7 G/DL (ref 3.5–5.2)
ALP SERPL-CCNC: 51 U/L (ref 55–135)
ALT SERPL W/O P-5'-P-CCNC: 18 U/L (ref 10–44)
ANION GAP SERPL CALC-SCNC: 7 MMOL/L (ref 8–16)
AST SERPL-CCNC: 34 U/L (ref 10–40)
BILIRUB SERPL-MCNC: 1 MG/DL (ref 0.1–1)
BUN SERPL-MCNC: 33 MG/DL (ref 8–23)
CALCIUM SERPL-MCNC: 8.7 MG/DL (ref 8.7–10.5)
CHLORIDE SERPL-SCNC: 114 MMOL/L (ref 95–110)
CO2 SERPL-SCNC: 21 MMOL/L (ref 23–29)
CREAT SERPL-MCNC: 1.7 MG/DL (ref 0.5–1.4)
ERYTHROCYTE [DISTWIDTH] IN BLOOD BY AUTOMATED COUNT: 14.6 % (ref 11.5–14.5)
EST. GFR  (AFRICAN AMERICAN): 33.8 ML/MIN/1.73 M^2
EST. GFR  (NON AFRICAN AMERICAN): 29.3 ML/MIN/1.73 M^2
GLUCOSE SERPL-MCNC: 86 MG/DL (ref 70–110)
HCT VFR BLD AUTO: 31.2 % (ref 37–48.5)
HGB BLD-MCNC: 9.1 G/DL (ref 12–16)
IMM GRANULOCYTES # BLD AUTO: 0.06 K/UL (ref 0–0.04)
MCH RBC QN AUTO: 28 PG (ref 27–31)
MCHC RBC AUTO-ENTMCNC: 29.2 G/DL (ref 32–36)
MCV RBC AUTO: 96 FL (ref 82–98)
NEUTROPHILS # BLD AUTO: 3.3 K/UL (ref 1.8–7.7)
PLATELET # BLD AUTO: 273 K/UL (ref 150–350)
PMV BLD AUTO: 9.6 FL (ref 9.2–12.9)
POTASSIUM SERPL-SCNC: 4.5 MMOL/L (ref 3.5–5.1)
PROT SERPL-MCNC: 5.8 G/DL (ref 6–8.4)
RBC # BLD AUTO: 3.25 M/UL (ref 4–5.4)
SODIUM SERPL-SCNC: 142 MMOL/L (ref 136–145)
WBC # BLD AUTO: 5.65 K/UL (ref 3.9–12.7)

## 2019-05-27 PROCEDURE — 85027 COMPLETE CBC AUTOMATED: CPT | Mod: HCNC

## 2019-05-27 PROCEDURE — 36415 COLL VENOUS BLD VENIPUNCTURE: CPT | Mod: HCNC

## 2019-05-27 PROCEDURE — 3078F DIAST BP <80 MM HG: CPT | Mod: HCNC,CPTII,S$GLB, | Performed by: INTERNAL MEDICINE

## 2019-05-27 PROCEDURE — 3288F FALL RISK ASSESSMENT DOCD: CPT | Mod: HCNC,CPTII,S$GLB, | Performed by: INTERNAL MEDICINE

## 2019-05-27 PROCEDURE — 99499 UNLISTED E&M SERVICE: CPT | Mod: HCNC,S$GLB,, | Performed by: INTERNAL MEDICINE

## 2019-05-27 PROCEDURE — 3078F PR MOST RECENT DIASTOLIC BLOOD PRESSURE < 80 MM HG: ICD-10-PCS | Mod: HCNC,CPTII,S$GLB, | Performed by: INTERNAL MEDICINE

## 2019-05-27 PROCEDURE — 99215 PR OFFICE/OUTPT VISIT, EST, LEVL V, 40-54 MIN: ICD-10-PCS | Mod: HCNC,S$GLB,, | Performed by: INTERNAL MEDICINE

## 2019-05-27 PROCEDURE — 99499 RISK ADDL DX/OHS AUDIT: ICD-10-PCS | Mod: HCNC,S$GLB,, | Performed by: INTERNAL MEDICINE

## 2019-05-27 PROCEDURE — 3075F PR MOST RECENT SYSTOLIC BLOOD PRESS GE 130-139MM HG: ICD-10-PCS | Mod: HCNC,CPTII,S$GLB, | Performed by: INTERNAL MEDICINE

## 2019-05-27 PROCEDURE — 99215 OFFICE O/P EST HI 40 MIN: CPT | Mod: HCNC,S$GLB,, | Performed by: INTERNAL MEDICINE

## 2019-05-27 PROCEDURE — 1100F PR PT FALLS ASSESS DOC 2+ FALLS/FALL W/INJURY/YR: ICD-10-PCS | Mod: HCNC,CPTII,S$GLB, | Performed by: INTERNAL MEDICINE

## 2019-05-27 PROCEDURE — 99999 PR PBB SHADOW E&M-EST. PATIENT-LVL V: CPT | Mod: PBBFAC,HCNC,, | Performed by: INTERNAL MEDICINE

## 2019-05-27 PROCEDURE — 3288F PR FALLS RISK ASSESSMENT DOCUMENTED: ICD-10-PCS | Mod: HCNC,CPTII,S$GLB, | Performed by: INTERNAL MEDICINE

## 2019-05-27 PROCEDURE — 63600175 PHARM REV CODE 636 W HCPCS: Mod: HCNC,JG | Performed by: INTERNAL MEDICINE

## 2019-05-27 PROCEDURE — 3075F SYST BP GE 130 - 139MM HG: CPT | Mod: HCNC,CPTII,S$GLB, | Performed by: INTERNAL MEDICINE

## 2019-05-27 PROCEDURE — 1100F PTFALLS ASSESS-DOCD GE2>/YR: CPT | Mod: HCNC,CPTII,S$GLB, | Performed by: INTERNAL MEDICINE

## 2019-05-27 PROCEDURE — 96372 THER/PROPH/DIAG INJ SC/IM: CPT | Mod: HCNC

## 2019-05-27 PROCEDURE — 80053 COMPREHEN METABOLIC PANEL: CPT | Mod: HCNC

## 2019-05-27 PROCEDURE — 99999 PR PBB SHADOW E&M-EST. PATIENT-LVL V: ICD-10-PCS | Mod: PBBFAC,HCNC,, | Performed by: INTERNAL MEDICINE

## 2019-05-27 RX ADMIN — DENOSUMAB 120 MG: 120 INJECTION SUBCUTANEOUS at 01:05

## 2019-05-27 NOTE — PROGRESS NOTES
"Subjective:       Patient ID: Naty St is a 73 y.o. female.    Chief Complaint: Lung Cancer  Oncologic History:  Ms. Naty St was admitted to the hospital between 01/25/2016 and 01/28/2016. She has a history of pancreatic cancer 17 years ago, breast cancer and meningioma, presented to the hospital complaining of loss of consciousness. The patient apparently was in her normal state of health and she stood up to go to the bathroom and fell to the floor. The patient's daughter helped the mother up in the bathroom and noted that her mother's upper extremities were shaking and eye rolling. No reports of bowel or bladder incontinence, tongue biting or rolling. The second episode lasted about four minutes and she was extremely lethargic following that. Apparently, the patient had a meningioma resection done in the past; however, recently, underwent neurosurgical resection with Dr. 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    Additional immunohistochemical stains were performed which revealed the tumor cells to be are positive for TTF1 and negative for ER and GCDFP. The morphology and TTF1 positivity are most consistent with lung primary.    Of note, imaging scan at the end of January 2016 revealed a mass in the lung at 2 cm in the medial aspect of the apical segment of the right upper lobe abutting the mediastinum at the level of the azygous vein and abutting and possibly encasing the segmental bronchi and vessels of the apical segment of the right upper lobe and no pleural fluid was present. Also, there is an enlarged right paratracheal lymph node. No evidence of any metastatic disease at the pancreatic site with postoperative changes post Whipple disease  Her PET Scan from 2/15/16 reveal "Hypermetabolic mass in the right lung apex consistent with a primary malignancy. Hypermetabolic " "mediastinal lymph nodes consistent with metastatic disease. Right sacral hypermetabolic lesion consistent with metastatic disease, noting additional mildly sclerotic lesions in multiple vertebral bodies which do not demonstrate abnormal hypermetabolism  She underwent IR bone biopsy which revealed metastatic adenocarcinoma of lung origin. 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  She has completed focal RT to brain lesions in March 2016.    PET scan from 6/6/16 shows "Dramatic almost complete response to therapy."  Lovenox started after US lower ext 6/7/16 shows Acute complete occlusion of one of the left posterior tibial vein.Remote partial thrombus of the proximal left superficial femoral vein.  She is on Tarceva.   12/6/16 PET scan reveals "Stable right upper lobe lesion and right hilar lymph node. No new lesions identified. Trace left pleural effusion".  1/27/17 Renal u/s "Medical renal disease. Nonobstructive right nephrolithiasis"  1/31/17 PET - "Right upper lobe nodule and right hilar lymph node similar and very low grade activity.  There is no definite evidence of recurrence."   11/9/17 PET "In this patient with history of lung cancer, there is interval increase in size and hypermetabolism of a right upper lobe lung lesion concerning for recurrent disease. Additionally, there is interval appearance of a hypermetabolic paratracheal lymph node suspicious for metastatic disease"     She progressed on Tarceva She underwent EBUS on 12/5/17. Pathology revealed adenocarcinoma but T790m could not be done as quantity was not insufficient. Her PET scan from 1/30/18 revealed The patient's previously identified abnormal lesions is slightly greater uptake of FDG on today's study compared with prior exam. These findings are concerning for progression of disease"      She was hospitalized between 4/9/18-4/16/18. Her hospital course was as follows "Patient was admitted to hemonc service on 4/9 with acute hypoxic respiratory " "failure. She was requiring 4 L of nc on admission. She was started on moxi for CAP. She received one dose of ceftriaxone in the ED. On 2nd day of admission she spiked a fever. Her Hb was ~7 g/dl so she was transfused 1 unit of PRBCs. Over night she developed worsening of her symptoms with increased O2 requirements to 15 L HFNC. Her CXR showed worsening congestion. There was a concern of TRALI vs TACO. New 2D echo with diastolic dysfunction. She was given IV lasix pushes as needed and was started on dexamethasone.  Her abx was escalated to vanc and cefepime. She improved with diuresis and steroids. Pulmonary were consulted. Her O2 requirements continued to improve. On 4/15 she was switched to Augmentin. PT recommended discharging her to SNF but the patient refused and she wanted to go home with home health. She qualified for home oxygen so she was discharged on 3 L nc while at rest and 6 while active. Augmentin and dexamethasone were discontinued on discharge"     She was readmitted from 4/27 to 5/3/18 with who presented to the ED with a complaint of fatigue and worsening DELA CRUZ. Pt diagnosed PNA 4/9 and was discharged on 4/16 on 3L oxygen. She was initially placed on cefepime for 3 days followed by an add'l 2 days of Augmentin. Pulm was consulted with assistance as her oxygen requirements were increasing. She was placed on oral steroids, then trailed on 80mg TID solumedrol for 2 days and will be discharged on a prednisone taper. She was also diuresed with 2 days of 40mg IV lasix with appropriate UOP resulting, improvement on repeat CXR. She was medically stable and appropriate for DC home with home health on 1L continuous O2. She will be seen for close f/u and may be able to come off oxygen at that time.      She discontinued Tagrisso in April 2018. Restaging scans from 6/4/18 revealed "Stable appearance of a spiculated right upper lobe pulmonary lesion.  Additional irregular focus superior to the lesion in the right " "lung apex is stable and may represent scar or additional lesion; although this was not hypermetabolic on prior PET-CT.  No new pulmonary lesions. 1.3 cm subcarinal lymph node, not hypermetabolic on prior PET-CT. Stable postsurgical changes of a Whipple procedure. Bilateral nonobstructing nephrolithiasis.  Stable sclerotic focus in the T5 vertebral body, possibly a bone island as this was not hypermetabolic on prior PET-CT. Small hiatal hernia. RECIST SUMMARY: Date of prior examination for comparison 01/30/2018 Lesion 1: Right upper lobe 1.3 cm Series 2 image 31 prior measurement 1.3 cm"  Bone scan also from 6/4/18 revealed no evidence of mets.     Her PET scan from 7/11/18 revealed "Right suprahilar lesion SUV max 3.76, previously 6.86. Pretracheal lymph node SUV max 2.55, previously 3.79. There is physiologic intracranial, head, and neck activity.  There is muscle activation.  There is vocal cord activation.  There is physiologic liver, spleen, GI and  activity.  There is a hiatal hernia.  Pelvic organs show nothing unusual.  No bone lesions are seen.  There are areas of benign appearing lung scar"  She notes fatigue.  She denies any nausea, vomiting, diarrhea, constipation, abdominal pain, weight loss or loss of appetite, chest pain, shortness of breath, leg swelling, fatigue, pain, headache, dizziness, or mood changes. Her ECOG PS is 2. She is accompanied by her  and son     She has been on Tarceva since 6/5/18. She has now completed SBRT to her right lung lesions    [facility] which reveals "Right paratracheal and right hilar lymph node have resolved.  In these locations there is inflammatory activity related to radiation pneumonitis.  No measurable disease seen on today's study"  She completed RT to paratracheal LN in August 2019    Review of Systems   Constitutional: Negative for appetite change, fatigue and unexpected weight change.   HENT: Negative for mouth sores.  "   Eyes: Negative for visual disturbance.   Respiratory: Negative for cough and shortness of breath.    Cardiovascular: Negative for chest pain.   Gastrointestinal: Negative for abdominal pain and diarrhea.   Genitourinary: Negative for frequency.   Musculoskeletal: Negative for back pain.   Skin: Negative for rash.   Neurological: Negative for headaches.   Hematological: Negative for adenopathy.   Psychiatric/Behavioral: The patient is not nervous/anxious.    All other systems reviewed and are negative.      Objective:      Physical Exam   Constitutional: She is oriented to person, place, and time. She appears well-developed and well-nourished.   HENT:   Mouth/Throat: No oropharyngeal exudate.   Cardiovascular: Normal rate and normal heart sounds.   Pulmonary/Chest: Effort normal and breath sounds normal. She has no wheezes.   Abdominal: Soft. Bowel sounds are normal. There is no tenderness.   Musculoskeletal: She exhibits no edema or tenderness.   Lymphadenopathy:     She has no cervical adenopathy.   Neurological: She is alert and oriented to person, place, and time. Coordination normal.   Skin: Skin is warm and dry. No rash noted.   Psychiatric: She has a normal mood and affect. Judgment and thought content normal.   Vitals reviewed.        LABS:  WBC   Date Value Ref Range Status   05/27/2019 5.65 3.90 - 12.70 K/uL Final     Hemoglobin   Date Value Ref Range Status   05/27/2019 9.1 (L) 12.0 - 16.0 g/dL Final     POC Hematocrit   Date Value Ref Range Status   01/12/2016 25 (L) 36 - 54 %PCV Final     Hematocrit   Date Value Ref Range Status   05/27/2019 31.2 (L) 37.0 - 48.5 % Final     Platelets   Date Value Ref Range Status   05/27/2019 273 150 - 350 K/uL Final     Gran # (ANC)   Date Value Ref Range Status   05/27/2019 3.3 1.8 - 7.7 K/uL Final     Comment:     The ANC is based on a white cell differential from an   automated cell counter. It has not been microscopically   reviewed for the presence of abnormal  cells. Clinical   correlation is required.         Chemistry        Component Value Date/Time     05/27/2019 1051    K 4.5 05/27/2019 1051     (H) 05/27/2019 1051    CO2 21 (L) 05/27/2019 1051    BUN 33 (H) 05/27/2019 1051    CREATININE 1.7 (H) 05/27/2019 1051    GLU 86 05/27/2019 1051        Component Value Date/Time    CALCIUM 8.7 05/27/2019 1051    ALKPHOS 51 (L) 05/27/2019 1051    AST 34 05/27/2019 1051    ALT 18 05/27/2019 1051    BILITOT 1.0 05/27/2019 1051    ESTGFRAFRICA 33.8 (A) 05/27/2019 1051    EGFRNONAA 29.3 (A) 05/27/2019 1051          Assessment:       1. Malignant neoplasm of lower lobe of right lung    2. Secondary cancer of bone    3. Brain metastases        Plan:        1,2,3. Her PET scans reveals a very good response. She will continue with tarceva and will return in 3 months with scans  Continue Xgeva every 4-6 weeks    Above care plan was discussed with patient and accompanying  and all questions were addressed to their satisfaction

## 2019-06-11 DIAGNOSIS — C34.31 MALIGNANT NEOPLASM OF LOWER LOBE OF RIGHT LUNG: ICD-10-CM

## 2019-06-11 RX ORDER — ERLOTINIB HYDROCHLORIDE 150 MG/1
150 TABLET, FILM COATED ORAL DAILY
Qty: 30 TABLET | Refills: 11 | Status: ON HOLD | OUTPATIENT
Start: 2019-06-11 | End: 2020-01-21 | Stop reason: SDUPTHER

## 2019-06-11 NOTE — TELEPHONE ENCOUNTER
----- Message from Penny Cohen sent at 6/11/2019 10:08 AM CDT -----  Type:  RX Refill Request    Who Called: Stephian    Refill or New Rx: refill   RX Name and Strength: TARCEVA 150 mg tablet  How is the patient currently taking it? (ex. 1XDay):    Is this a 30 day or 90 day RX:    Preferred Pharmacy with phone number: [facility] [address]   [phone] (Phone)  [phone] (Fax)  Local or Mail Order:    Ordering Provider:    Best Call Back Number: [phone]  Additional Information:   Thank You  LOLLY Cohen

## 2019-06-19 ENCOUNTER — TELEPHONE (OUTPATIENT)
Dept: HEMATOLOGY/ONCOLOGY | Facility: CLINIC | Age: 74
End: 2019-06-19

## 2019-06-19 NOTE — TELEPHONE ENCOUNTER
----- Message from Penny Cohen sent at 6/19/2019 12:47 PM CDT -----  Contact: Pt   Pt called to speak nurse wen have some questions   Callback#[phone]  Thank You  LOLLY Cohen

## 2019-06-19 NOTE — TELEPHONE ENCOUNTER
Spoke to patient's son, he states there is trouble with her medication that comes from a specialty pharmacy.    Spoke to pharmacy, verified prescription, they are waiting on patient to call and set up shipment.    Spoke to patient, gave her number to specialty pharmacy. She will call when we hang up.

## 2019-06-24 ENCOUNTER — TELEPHONE (OUTPATIENT)
Dept: HEMATOLOGY/ONCOLOGY | Facility: CLINIC | Age: 74
End: 2019-06-24

## 2019-06-24 NOTE — TELEPHONE ENCOUNTER
----- Message from Dami Virgen sent at 6/24/2019  3:18 PM CDT -----  Contact: pt   Pt would like a call back regarding rescheduling missed appointment pt had days mixed up        Pt can be reached at  [phone]

## 2019-06-24 NOTE — TELEPHONE ENCOUNTER
spoke with pt on today in regards to rescheduling missed appointments, pt is aware and has confirm.

## 2019-06-25 ENCOUNTER — LAB VISIT (OUTPATIENT)
Dept: LAB | Facility: HOSPITAL | Age: 74
End: 2019-06-25
Attending: INTERNAL MEDICINE
Payer: MEDICARE

## 2019-06-25 DIAGNOSIS — I10 ESSENTIAL HYPERTENSION: ICD-10-CM

## 2019-06-25 DIAGNOSIS — C34.31 MALIGNANT NEOPLASM OF LOWER LOBE OF RIGHT LUNG: ICD-10-CM

## 2019-06-25 DIAGNOSIS — I42.0 DILATED CARDIOMYOPATHY: ICD-10-CM

## 2019-06-25 LAB
ALBUMIN SERPL BCP-MCNC: 2.9 G/DL (ref 3.5–5.2)
ALP SERPL-CCNC: 65 U/L (ref 55–135)
ALT SERPL W/O P-5'-P-CCNC: 24 U/L (ref 10–44)
ANION GAP SERPL CALC-SCNC: 8 MMOL/L (ref 8–16)
AST SERPL-CCNC: 37 U/L (ref 10–40)
BILIRUB SERPL-MCNC: 0.9 MG/DL (ref 0.1–1)
BUN SERPL-MCNC: 38 MG/DL (ref 8–23)
CALCIUM SERPL-MCNC: 9.2 MG/DL (ref 8.7–10.5)
CHLORIDE SERPL-SCNC: 114 MMOL/L (ref 95–110)
CO2 SERPL-SCNC: 16 MMOL/L (ref 23–29)
CREAT SERPL-MCNC: 1.8 MG/DL (ref 0.5–1.4)
EST. GFR  (AFRICAN AMERICAN): 31.5 ML/MIN/1.73 M^2
EST. GFR  (NON AFRICAN AMERICAN): 27.3 ML/MIN/1.73 M^2
GLUCOSE SERPL-MCNC: 82 MG/DL (ref 70–110)
POTASSIUM SERPL-SCNC: 4.6 MMOL/L (ref 3.5–5.1)
PROT SERPL-MCNC: 5.8 G/DL (ref 6–8.4)
SODIUM SERPL-SCNC: 138 MMOL/L (ref 136–145)

## 2019-06-25 PROCEDURE — 36415 COLL VENOUS BLD VENIPUNCTURE: CPT | Mod: HCNC

## 2019-06-25 PROCEDURE — 80053 COMPREHEN METABOLIC PANEL: CPT | Mod: HCNC

## 2019-06-25 RX ORDER — LOSARTAN POTASSIUM 50 MG/1
TABLET ORAL
Qty: 60 TABLET | Refills: 10 | Status: SHIPPED | OUTPATIENT
Start: 2019-06-25 | End: 2020-07-13

## 2019-06-26 ENCOUNTER — TELEPHONE (OUTPATIENT)
Dept: HEMATOLOGY/ONCOLOGY | Facility: CLINIC | Age: 74
End: 2019-06-26

## 2019-06-26 ENCOUNTER — INFUSION (OUTPATIENT)
Dept: INFUSION THERAPY | Facility: HOSPITAL | Age: 74
End: 2019-06-26
Attending: INTERNAL MEDICINE
Payer: MEDICARE

## 2019-06-26 VITALS — HEART RATE: 61 BPM | RESPIRATION RATE: 18 BRPM | SYSTOLIC BLOOD PRESSURE: 110 MMHG | DIASTOLIC BLOOD PRESSURE: 53 MMHG

## 2019-06-26 DIAGNOSIS — C34.31 MALIGNANT NEOPLASM OF LOWER LOBE OF RIGHT LUNG: Primary | ICD-10-CM

## 2019-06-26 PROCEDURE — 63600175 PHARM REV CODE 636 W HCPCS: Mod: HCNC,JG | Performed by: INTERNAL MEDICINE

## 2019-06-26 PROCEDURE — 96372 THER/PROPH/DIAG INJ SC/IM: CPT | Mod: HCNC

## 2019-06-26 RX ADMIN — DENOSUMAB 120 MG: 120 INJECTION SUBCUTANEOUS at 12:06

## 2019-06-26 NOTE — TELEPHONE ENCOUNTER
"----- Message from Dorinda Giles sent at 6/26/2019 10:10 AM CDT -----  Contact: Pt   Pt called stating that she hasn't been feeling well and she may need to schedule a emergency infusion. Please contact her as soon as possible at [phone] or her Son, Basil at [phone].  "Thank you for all that you do for our patients'"  "

## 2019-06-26 NOTE — NURSING
Patient reports no dizziness today like yesterday. Orthostatic blood pressure taken and reported to Zandra LEE. Patient states she's drinking plenty of fluids at home and does not need iv fluids today. Educated patient on slowly changing from a sitting to standing. Verbalized understanding.

## 2019-06-26 NOTE — TELEPHONE ENCOUNTER
Spoke with patient. She notes vertigo this morning. Her CMP does not indicate dehydration. Her BP is stable 144/74. Spoke with asha in injection room----she will assess patient today when she comes for xgeva--and be in touch with nurse/dr sullivan.

## 2019-06-27 ENCOUNTER — TELEPHONE (OUTPATIENT)
Dept: HEMATOLOGY/ONCOLOGY | Facility: CLINIC | Age: 74
End: 2019-06-27

## 2019-06-27 NOTE — TELEPHONE ENCOUNTER
----- Message from Jossy Swanson sent at 6/27/2019  2:18 PM CDT -----  Contact: patient's son shirley  Patient called to speak with a nurse about his mother. Gave no further details.    He would like a callback at [phone]    Thanks  KB

## 2019-06-27 NOTE — TELEPHONE ENCOUNTER
Spoke with patient. She notes she is experiencing vertigo today. No blurred vision, no confusion, no difficulty ambulating or with speaking.    Patient will let son know nurse returned his phone call.

## 2019-07-10 ENCOUNTER — OFFICE VISIT (OUTPATIENT)
Dept: INTERNAL MEDICINE | Facility: CLINIC | Age: 74
End: 2019-07-10
Payer: MEDICARE

## 2019-07-10 VITALS
DIASTOLIC BLOOD PRESSURE: 66 MMHG | HEART RATE: 65 BPM | SYSTOLIC BLOOD PRESSURE: 128 MMHG | BODY MASS INDEX: 23.16 KG/M2 | OXYGEN SATURATION: 99 % | WEIGHT: 143.5 LBS

## 2019-07-10 DIAGNOSIS — N18.30 ANEMIA OF CHRONIC RENAL FAILURE, STAGE 3 (MODERATE): ICD-10-CM

## 2019-07-10 DIAGNOSIS — D63.1 ANEMIA OF CHRONIC RENAL FAILURE, STAGE 3 (MODERATE): ICD-10-CM

## 2019-07-10 DIAGNOSIS — N18.30 STAGE 3 CHRONIC KIDNEY DISEASE: ICD-10-CM

## 2019-07-10 DIAGNOSIS — N18.30 CKD (CHRONIC KIDNEY DISEASE) STAGE 3, GFR 30-59 ML/MIN: ICD-10-CM

## 2019-07-10 DIAGNOSIS — H93.8X2 EAR FULLNESS, LEFT: ICD-10-CM

## 2019-07-10 DIAGNOSIS — H91.92 HEARING LOSS ASSOCIATED WITH SYNDROME OF LEFT EAR: ICD-10-CM

## 2019-07-10 DIAGNOSIS — H92.02 CHRONIC LEFT EAR PAIN: ICD-10-CM

## 2019-07-10 DIAGNOSIS — C79.31 BRAIN METASTASES: ICD-10-CM

## 2019-07-10 DIAGNOSIS — Z00.00 ROUTINE PHYSICAL EXAMINATION: Primary | ICD-10-CM

## 2019-07-10 DIAGNOSIS — C77.1 SECONDARY MALIGNANT NEOPLASM OF INTRATHORACIC LYMPH NODES: ICD-10-CM

## 2019-07-10 DIAGNOSIS — I42.0 DILATED CARDIOMYOPATHY: ICD-10-CM

## 2019-07-10 DIAGNOSIS — F32.A DEPRESSION, UNSPECIFIED DEPRESSION TYPE: ICD-10-CM

## 2019-07-10 DIAGNOSIS — I10 ESSENTIAL HYPERTENSION: ICD-10-CM

## 2019-07-10 DIAGNOSIS — C34.31 MALIGNANT NEOPLASM OF LOWER LOBE OF RIGHT LUNG: ICD-10-CM

## 2019-07-10 DIAGNOSIS — L27.0 ACNEIFORM DRUG ERUPTION: ICD-10-CM

## 2019-07-10 DIAGNOSIS — D32.9 MENINGIOMA: Chronic | ICD-10-CM

## 2019-07-10 DIAGNOSIS — G89.29 CHRONIC LEFT EAR PAIN: ICD-10-CM

## 2019-07-10 PROBLEM — I82.409 ACUTE DEEP VEIN THROMBOSIS: Status: RESOLVED | Noted: 2017-11-10 | Resolved: 2019-07-10

## 2019-07-10 PROCEDURE — 99499 UNLISTED E&M SERVICE: CPT | Mod: HCNC,S$GLB,, | Performed by: INTERNAL MEDICINE

## 2019-07-10 PROCEDURE — 99999 PR PBB SHADOW E&M-EST. PATIENT-LVL IV: CPT | Mod: PBBFAC,HCNC,, | Performed by: INTERNAL MEDICINE

## 2019-07-10 PROCEDURE — 3078F PR MOST RECENT DIASTOLIC BLOOD PRESSURE < 80 MM HG: ICD-10-PCS | Mod: HCNC,CPTII,S$GLB, | Performed by: INTERNAL MEDICINE

## 2019-07-10 PROCEDURE — 99499 RISK ADDL DX/OHS AUDIT: ICD-10-PCS | Mod: HCNC,S$GLB,, | Performed by: INTERNAL MEDICINE

## 2019-07-10 PROCEDURE — 3074F SYST BP LT 130 MM HG: CPT | Mod: HCNC,CPTII,S$GLB, | Performed by: INTERNAL MEDICINE

## 2019-07-10 PROCEDURE — 3078F DIAST BP <80 MM HG: CPT | Mod: HCNC,CPTII,S$GLB, | Performed by: INTERNAL MEDICINE

## 2019-07-10 PROCEDURE — 99397 PER PM REEVAL EST PAT 65+ YR: CPT | Mod: HCNC,S$GLB,, | Performed by: INTERNAL MEDICINE

## 2019-07-10 PROCEDURE — 3074F PR MOST RECENT SYSTOLIC BLOOD PRESSURE < 130 MM HG: ICD-10-PCS | Mod: HCNC,CPTII,S$GLB, | Performed by: INTERNAL MEDICINE

## 2019-07-10 PROCEDURE — 99999 PR PBB SHADOW E&M-EST. PATIENT-LVL IV: ICD-10-PCS | Mod: PBBFAC,HCNC,, | Performed by: INTERNAL MEDICINE

## 2019-07-10 PROCEDURE — 99397 PR PREVENTIVE VISIT,EST,65 & OVER: ICD-10-PCS | Mod: HCNC,S$GLB,, | Performed by: INTERNAL MEDICINE

## 2019-07-10 RX ORDER — LORAZEPAM 1 MG/1
1 TABLET ORAL 2 TIMES DAILY
Qty: 60 TABLET | Refills: 5 | Status: SHIPPED | OUTPATIENT
Start: 2019-07-10 | End: 2020-03-10

## 2019-07-10 RX ORDER — ATORVASTATIN CALCIUM 40 MG/1
40 TABLET, FILM COATED ORAL DAILY
Qty: 90 TABLET | Refills: 12 | Status: ON HOLD | OUTPATIENT
Start: 2019-07-10 | End: 2020-02-19 | Stop reason: HOSPADM

## 2019-07-10 RX ORDER — CLINDAMYCIN PHOSPHATE 10 MG/G
GEL TOPICAL
Qty: 60 G | Refills: 6 | Status: SHIPPED | OUTPATIENT
Start: 2019-07-10 | End: 2019-09-17 | Stop reason: SDUPTHER

## 2019-07-10 RX ORDER — AMITRIPTYLINE HYDROCHLORIDE 50 MG/1
50 TABLET, FILM COATED ORAL NIGHTLY
Qty: 90 TABLET | Refills: 12 | Status: SHIPPED | OUTPATIENT
Start: 2019-07-10 | End: 2020-08-23

## 2019-07-10 NOTE — PROGRESS NOTES
Subjective:       Patient ID: Naty St is a 73 y.o. female.    Chief Complaint: Annual Exam    HPI:  73-year-old female with multiple primary cancers over the years including pancreas, breast cancer, metastasis to the brain and most recently lung cancer comes in for follow-up.  She continues on maintenance medication and says her kidney function and blood count have been stable.  She gets scan to regularly by Oncology and they have been stable.  She needed a few prescriptions refilled and wanted to get and I and ear examination.  She said she feels weak, tired but generally okay.  Weight has actually slowly crept up in the last 6 months.  Vitals are stable.    Review of Systems   Constitutional: Positive for fatigue. Negative for appetite change, chills and fever.   HENT: Positive for hearing loss. Negative for nosebleeds, sinus pain and sore throat.    Eyes: Positive for visual disturbance.   Respiratory: Negative for cough, shortness of breath and wheezing.    Cardiovascular: Negative for chest pain and leg swelling.   Gastrointestinal: Negative for abdominal pain, constipation and diarrhea.   Genitourinary: Negative for difficulty urinating and hematuria.   Musculoskeletal: Positive for arthralgias. Negative for neck pain and neck stiffness.   Skin: Negative for pallor and rash.   Neurological: Positive for weakness and headaches.   Psychiatric/Behavioral: Negative for dysphoric mood and suicidal ideas. The patient is not nervous/anxious.        Objective:      Physical Exam   Constitutional: She is oriented to person, place, and time. She appears well-developed and well-nourished. No distress.   HENT:   Head: Normocephalic and atraumatic.   Right Ear: External ear normal.   Left Ear: External ear normal.   Mouth/Throat: Oropharynx is clear and moist. No oropharyngeal exudate.   Eyes: Pupils are equal, round, and reactive to light. Conjunctivae are normal. No scleral icterus.   Neck: Normal range of  motion. Neck supple. No thyromegaly present.   Cardiovascular: Normal rate and regular rhythm.   No murmur heard.  Pulmonary/Chest: Effort normal and breath sounds normal. She has no wheezes.   Abdominal: Soft. Bowel sounds are normal. She exhibits no distension. There is no tenderness.   Musculoskeletal: She exhibits no tenderness.   Lymphadenopathy:     She has no cervical adenopathy.   Neurological: She is alert and oriented to person, place, and time.   Skin: No rash noted.   Healed scars on the scalp and abdomen   Psychiatric: She has a normal mood and affect.       Assessment:       1. Routine physical examination    2. Depression, unspecified depression type    3. Acneiform drug eruption    4. Stage 3 chronic kidney disease    5. Malignant neoplasm of lower lobe of right lung    6. Brain metastases    7. Anemia of chronic renal failure, stage 3 (moderate)    8. Chronic left ear pain    9. 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 Ear fullness, left    11. 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 CKD (chronic kidney disease) stage 3, GFR 30-59 ml/min    14. Dilated cardiomyopathy    15. Essential hypertension        Plan:       Naty was seen today for annual exam.    Diagnoses and all orders for this visit:    Routine physical examination  -     Ambulatory referral to Optometry    Depression, unspecified depression type  -     amitriptyline (ELAVIL) 50 MG tablet; Take 1 tablet (50 mg total) by mouth every evening.    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  -     atorvastatin (LIPITOR) 40 MG tablet; Take 1 tablet (40 mg total) by mouth once daily.

## 2019-07-22 ENCOUNTER — INFUSION (OUTPATIENT)
Dept: INFUSION THERAPY | Facility: HOSPITAL | Age: 74
End: 2019-07-22
Attending: INTERNAL MEDICINE
Payer: MEDICARE

## 2019-07-22 DIAGNOSIS — C34.31 MALIGNANT NEOPLASM OF LOWER LOBE OF RIGHT LUNG: Primary | ICD-10-CM

## 2019-07-22 PROCEDURE — 96372 THER/PROPH/DIAG INJ SC/IM: CPT | Mod: HCNC

## 2019-07-22 PROCEDURE — 63600175 PHARM REV CODE 636 W HCPCS: Mod: HCNC,JG | Performed by: INTERNAL MEDICINE

## 2019-07-22 RX ADMIN — DENOSUMAB 120 MG: 120 INJECTION SUBCUTANEOUS at 01:07

## 2019-07-23 ENCOUNTER — TELEPHONE (OUTPATIENT)
Dept: INTERNAL MEDICINE | Facility: CLINIC | Age: 74
End: 2019-07-23

## 2019-07-25 DIAGNOSIS — C79.31 BRAIN METASTASES: ICD-10-CM

## 2019-07-25 RX ORDER — LEVETIRACETAM 500 MG/1
TABLET ORAL
Qty: 180 TABLET | Refills: 3 | Status: SHIPPED | OUTPATIENT
Start: 2019-07-25 | End: 2020-08-04

## 2019-07-26 DIAGNOSIS — C34.31 MALIGNANT NEOPLASM OF LOWER LOBE OF RIGHT LUNG: ICD-10-CM

## 2019-07-26 DIAGNOSIS — R63.0 ANOREXIA: ICD-10-CM

## 2019-07-26 RX ORDER — DRONABINOL 5 MG/1
5 CAPSULE ORAL
Qty: 60 CAPSULE | Refills: 3 | Status: ON HOLD | OUTPATIENT
Start: 2019-07-26 | End: 2020-06-02

## 2019-07-29 ENCOUNTER — TELEPHONE (OUTPATIENT)
Dept: HEMATOLOGY/ONCOLOGY | Facility: CLINIC | Age: 74
End: 2019-07-29

## 2019-07-29 NOTE — TELEPHONE ENCOUNTER
----- Message from Penny Cohen sent at 7/29/2019  8:15 AM CDT -----  Pt son Basil called to speak with nurse Zandra have some questions  Callback#[phone]  Thank You  LOLLY Cohen

## 2019-08-07 ENCOUNTER — TELEPHONE (OUTPATIENT)
Dept: INTERNAL MEDICINE | Facility: CLINIC | Age: 74
End: 2019-08-07

## 2019-08-13 ENCOUNTER — TELEPHONE (OUTPATIENT)
Dept: INTERNAL MEDICINE | Facility: CLINIC | Age: 74
End: 2019-08-13

## 2019-08-14 ENCOUNTER — TELEPHONE (OUTPATIENT)
Dept: HEMATOLOGY/ONCOLOGY | Facility: CLINIC | Age: 74
End: 2019-08-14

## 2019-08-14 NOTE — TELEPHONE ENCOUNTER
Spoke with patient's son. He notes patient is with his sister in [place] and had a fall. Went to ED--and complete workup was performed.    Reviewed upcoming appointments with patient's son.   He thanked nurse.

## 2019-08-14 NOTE — TELEPHONE ENCOUNTER
"----- Message from Dorinda Giles sent at 8/14/2019  9:08 AM CDT -----  Contact: BRIAN Lopez   Name Of Caller: Basil Lopez   Provider name: Karrie Vazquez MD   What is the nature of the call?:  - Basil states that the pt had a bad fall and he has questions about her future appts. Please contact him as soon as possible. Pt had a head scan and x-ray done at outside ED while traveling out of town.  Relationship to the Pt?: Son   Contact Preference?: [phone]    Additional Notes:  "Thank you for all that you do for our patients'"      "

## 2019-08-26 ENCOUNTER — HOSPITAL ENCOUNTER (OUTPATIENT)
Dept: RADIOLOGY | Facility: HOSPITAL | Age: 74
Discharge: HOME | End: 2019-08-26
Attending: INTERNAL MEDICINE
Payer: MEDICARE

## 2019-08-26 DIAGNOSIS — C34.31 MALIGNANT NEOPLASM OF LOWER LOBE OF RIGHT LUNG: ICD-10-CM

## 2019-08-26 LAB — POCT GLUCOSE: 78 MG/DL (ref 70–110)

## 2019-08-26 PROCEDURE — 70553 MRI BRAIN STEM W/O & W/DYE: CPT | Mod: 26,HCNC,, | Performed by: RADIOLOGY

## 2019-08-26 PROCEDURE — A9585 GADOBUTROL INJECTION: HCPCS | Mod: HCNC | Performed by: INTERNAL MEDICINE

## 2019-08-26 PROCEDURE — 70553 MRI BRAIN W WO CONTRAST: ICD-10-PCS | Mod: 26,HCNC,, | Performed by: RADIOLOGY

## 2019-08-26 PROCEDURE — 25500020 PHARM REV CODE 255: Mod: HCNC | Performed by: INTERNAL MEDICINE

## 2019-08-26 PROCEDURE — 78815 PET IMAGE W/CT SKULL-THIGH: CPT | Mod: TC,HCNC

## 2019-08-26 PROCEDURE — 70553 MRI BRAIN STEM W/O & W/DYE: CPT | Mod: TC,HCNC

## 2019-08-26 PROCEDURE — 78815 NM PET CT ROUTINE: ICD-10-PCS | Mod: 26,HCNC,PS, | Performed by: RADIOLOGY

## 2019-08-26 PROCEDURE — 78815 PET IMAGE W/CT SKULL-THIGH: CPT | Mod: 26,HCNC,PS, | Performed by: RADIOLOGY

## 2019-08-26 PROCEDURE — A9552 F18 FDG: HCPCS | Mod: HCNC

## 2019-08-26 RX ORDER — GADOBUTROL 604.72 MG/ML
7 INJECTION INTRAVENOUS
Status: COMPLETED | OUTPATIENT
Start: 2019-08-26 | End: 2019-08-26

## 2019-08-26 RX ADMIN — GADOBUTROL 7 ML: 604.72 INJECTION INTRAVENOUS at 06:08

## 2019-08-27 ENCOUNTER — TELEPHONE (OUTPATIENT)
Dept: HEMATOLOGY/ONCOLOGY | Facility: CLINIC | Age: 74
End: 2019-08-27

## 2019-08-27 ENCOUNTER — INFUSION (OUTPATIENT)
Dept: INFUSION THERAPY | Facility: HOSPITAL | Age: 74
End: 2019-08-27
Attending: INTERNAL MEDICINE
Payer: MEDICARE

## 2019-08-27 ENCOUNTER — OFFICE VISIT (OUTPATIENT)
Dept: HEMATOLOGY/ONCOLOGY | Facility: CLINIC | Age: 74
End: 2019-08-27
Payer: MEDICARE

## 2019-08-27 VITALS
BODY MASS INDEX: 22.96 KG/M2 | WEIGHT: 142.88 LBS | SYSTOLIC BLOOD PRESSURE: 119 MMHG | RESPIRATION RATE: 17 BRPM | OXYGEN SATURATION: 99 % | HEIGHT: 66 IN | TEMPERATURE: 98 F | DIASTOLIC BLOOD PRESSURE: 65 MMHG | HEART RATE: 75 BPM

## 2019-08-27 DIAGNOSIS — C34.31 MALIGNANT NEOPLASM OF LOWER LOBE OF RIGHT LUNG: Primary | ICD-10-CM

## 2019-08-27 DIAGNOSIS — C79.51 SECONDARY CANCER OF BONE: ICD-10-CM

## 2019-08-27 DIAGNOSIS — N18.30 CKD (CHRONIC KIDNEY DISEASE) STAGE 3, GFR 30-59 ML/MIN: ICD-10-CM

## 2019-08-27 PROCEDURE — 99999 PR PBB SHADOW E&M-EST. PATIENT-LVL V: ICD-10-PCS | Mod: PBBFAC,HCNC,, | Performed by: INTERNAL MEDICINE

## 2019-08-27 PROCEDURE — 1101F PT FALLS ASSESS-DOCD LE1/YR: CPT | Mod: HCNC,CPTII,S$GLB, | Performed by: INTERNAL MEDICINE

## 2019-08-27 PROCEDURE — 63600175 PHARM REV CODE 636 W HCPCS: Mod: JG,HCNC | Performed by: INTERNAL MEDICINE

## 2019-08-27 PROCEDURE — 3074F SYST BP LT 130 MM HG: CPT | Mod: HCNC,CPTII,S$GLB, | Performed by: INTERNAL MEDICINE

## 2019-08-27 PROCEDURE — 99214 PR OFFICE/OUTPT VISIT, EST, LEVL IV, 30-39 MIN: ICD-10-PCS | Mod: HCNC,S$GLB,, | Performed by: INTERNAL MEDICINE

## 2019-08-27 PROCEDURE — 96372 THER/PROPH/DIAG INJ SC/IM: CPT | Mod: HCNC

## 2019-08-27 PROCEDURE — 99499 RISK ADDL DX/OHS AUDIT: ICD-10-PCS | Mod: HCNC,S$GLB,, | Performed by: INTERNAL MEDICINE

## 2019-08-27 PROCEDURE — 99499 UNLISTED E&M SERVICE: CPT | Mod: HCNC,S$GLB,, | Performed by: INTERNAL MEDICINE

## 2019-08-27 PROCEDURE — 3078F PR MOST RECENT DIASTOLIC BLOOD PRESSURE < 80 MM HG: ICD-10-PCS | Mod: HCNC,CPTII,S$GLB, | Performed by: INTERNAL MEDICINE

## 2019-08-27 PROCEDURE — 1101F PR PT FALLS ASSESS DOC 0-1 FALLS W/OUT INJ PAST YR: ICD-10-PCS | Mod: HCNC,CPTII,S$GLB, | Performed by: INTERNAL MEDICINE

## 2019-08-27 PROCEDURE — 99999 PR PBB SHADOW E&M-EST. PATIENT-LVL V: CPT | Mod: PBBFAC,HCNC,, | Performed by: INTERNAL MEDICINE

## 2019-08-27 PROCEDURE — 99214 OFFICE O/P EST MOD 30 MIN: CPT | Mod: HCNC,S$GLB,, | Performed by: INTERNAL MEDICINE

## 2019-08-27 PROCEDURE — 3078F DIAST BP <80 MM HG: CPT | Mod: HCNC,CPTII,S$GLB, | Performed by: INTERNAL MEDICINE

## 2019-08-27 PROCEDURE — 3074F PR MOST RECENT SYSTOLIC BLOOD PRESSURE < 130 MM HG: ICD-10-PCS | Mod: HCNC,CPTII,S$GLB, | Performed by: INTERNAL MEDICINE

## 2019-08-27 RX ADMIN — DENOSUMAB 120 MG: 120 INJECTION SUBCUTANEOUS at 01:08

## 2019-08-27 NOTE — TELEPHONE ENCOUNTER
----- Message from Alina Sigala sent at 8/27/2019  2:13 PM CDT -----  Contact: Alexa (daughter)  Staff Message     Caller name: Alexa     Reason for call: Needs the address to the prosthetic wig location.         Communication Preference: [phone]    Additional Information:

## 2019-08-27 NOTE — Clinical Note
CMP now prior to Xgeva at 1Schedule CBC,CMP every 4 weeks and for Xgeva. Schedule CBC, CMP, PET scan and see me in 3 months and for Xgeva

## 2019-08-27 NOTE — TELEPHONE ENCOUNTER
"----- Message from Dorinda Giles sent at 8/27/2019  9:05 AM CDT -----  Contact: BRIAN Lopez   Name Of Caller: Basil   Provider name: George VALENTINO MD  What is the nature of the call?:  - calling to f/u with RN, Pt is fine but he has a question/concern about the appt today at 11a.m  Does patient feel the need to be seen today? No   Relationship to the Pt?: Son   Contact Preference?: [phone]    Additional Notes:    "Thank you for all that you do for our patients'"      "

## 2019-08-27 NOTE — PROGRESS NOTES
"Subjective:       Patient ID: Naty St is a 73 y.o. female.    Chief Complaint: Malignant neoplasm of lower lobe of right lung  Oncologic History:  Ms. Naty St was admitted to the hospital between 01/25/2016 and 01/28/2016. She has a history of pancreatic cancer 17 years ago, breast cancer and meningioma, presented to the hospital complaining of loss of consciousness. The patient apparently was in her normal state of health and she stood up to go to the bathroom and fell to the floor. The patient's daughter helped the mother up in the bathroom and noted that her mother's upper extremities were shaking and eye rolling. No reports of bowel or bladder incontinence, tongue biting or rolling. The second episode lasted about four minutes and she was extremely lethargic following that. Apparently, the patient had a meningioma resection done in the past; however, recently, underwent neurosurgical resection with Dr. 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    Additional immunohistochemical stains were performed which revealed the tumor cells to be are positive for TTF1 and negative for ER and GCDFP. The morphology and TTF1 positivity are most consistent with lung primary.    Of note, imaging scan at the end of January 2016 revealed a mass in the lung at 2 cm in the medial aspect of the apical segment of the right upper lobe abutting the mediastinum at the level of the azygous vein and abutting and possibly encasing the segmental bronchi and vessels of the apical segment of the right upper lobe and no pleural fluid was present. Also, there is an enlarged right paratracheal lymph node. No evidence of any metastatic disease at the pancreatic site with postoperative changes post Whipple disease  Her PET Scan from 2/15/16 reveal "Hypermetabolic mass in the right lung apex consistent with a " "primary malignancy. Hypermetabolic mediastinal lymph nodes consistent with metastatic disease. Right sacral hypermetabolic lesion consistent with metastatic disease, noting additional mildly sclerotic lesions in multiple vertebral bodies which do not demonstrate abnormal hypermetabolism  She underwent IR bone biopsy which revealed metastatic adenocarcinoma of lung origin. 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  She has completed focal RT to brain lesions in March 2016.    PET scan from 6/6/16 shows "Dramatic almost complete response to therapy."  Lovenox started after US lower ext 6/7/16 shows Acute complete occlusion of one of the left posterior tibial vein.Remote partial thrombus of the proximal left superficial femoral vein.  She is on Tarceva.   12/6/16 PET scan reveals "Stable right upper lobe lesion and right hilar lymph node. No new lesions identified. Trace left pleural effusion".  1/27/17 Renal u/s "Medical renal disease. Nonobstructive right nephrolithiasis"  1/31/17 PET - "Right upper lobe nodule and right hilar lymph node similar and very low grade activity.  There is no definite evidence of recurrence."   11/9/17 PET "In this patient with history of lung cancer, there is interval increase in size and hypermetabolism of a right upper lobe lung lesion concerning for recurrent disease. Additionally, there is interval appearance of a hypermetabolic paratracheal lymph node suspicious for metastatic disease"     She progressed on Tarceva She underwent EBUS on 12/5/17. Pathology revealed adenocarcinoma but T790m could not be done as quantity was not insufficient. Her PET scan from 1/30/18 revealed The patient's previously identified abnormal lesions is slightly greater uptake of FDG on today's study compared with prior exam. These findings are concerning for progression of disease"      She was hospitalized between 4/9/18-4/16/18. Her hospital course was as follows "[facility] with acute hypoxic respiratory failure. She was requiring 4 L of nc on admission. She was started on moxi for CAP. She received one dose of ceftriaxone in the ED. On 2nd day of admission she spiked a fever. Her Hb was ~7 g/dl so she was transfused 1 unit of PRBCs. Over night she developed worsening of her symptoms with increased O2 requirements to 15 L HFNC. Her CXR showed worsening congestion. There was a concern of TRALI vs TACO. New 2D echo with diastolic dysfunction. She was given IV lasix pushes as needed and was started on dexamethasone.  Her abx was escalated to vanc and cefepime. She improved with diuresis and steroids. Pulmonary were consulted. Her O2 requirements continued to improve. On 4/15 she was switched to Augmentin. PT recommended discharging her to SNF but the patient refused and she wanted to go home with home health. She qualified for home oxygen so she was discharged on 3 L nc while at rest and 6 while active. Augmentin and dexamethasone were discontinued on discharge"     She was readmitted from 4/27 to 5/3/18 with who presented to the ED with a complaint of fatigue and worsening DELA CRUZ. Pt diagnosed PNA 4/9 and was discharged on 4/16 on 3L oxygen. She was initially placed on cefepime for 3 days followed by an add'l 2 days of Augmentin. Pulm was consulted with assistance as her oxygen requirements were increasing. She was placed on oral steroids, then trailed on 80mg TID solumedrol for 2 days and will be discharged on a prednisone taper. She was also diuresed with 2 days of 40mg IV lasix with appropriate UOP resulting, improvement on repeat CXR. She was medically stable and appropriate for DC home with home health on 1L continuous O2. She will be seen for close f/u and may be able to come off oxygen at that time.      She discontinued Tagrisso in April 2018. Restaging scans from 6/4/18 revealed "Stable appearance of a spiculated right upper lobe pulmonary lesion.  Additional irregular focus " "superior to the lesion in the right lung apex is stable and may represent scar or additional lesion; although this was not hypermetabolic on prior PET-CT.  No new pulmonary lesions. 1.3 cm subcarinal lymph node, not hypermetabolic on prior PET-CT. Stable postsurgical changes of a Whipple procedure. Bilateral nonobstructing nephrolithiasis.  Stable sclerotic focus in the T5 vertebral body, possibly a bone island as this was not hypermetabolic on prior PET-CT. Small hiatal hernia. RECIST SUMMARY: Date of prior examination for comparison 01/30/2018 Lesion 1: Right upper lobe 1.3 cm Series 2 image 31 prior measurement 1.3 cm"  Bone scan also from 6/4/18 revealed no evidence of mets.     Her PET scan from 7/11/18 revealed "Right suprahilar lesion SUV max 3.76, previously 6.86. Pretracheal lymph node SUV max 2.55, previously 3.79. There is physiologic intracranial, head, and neck activity.  There is muscle activation.  There is vocal cord activation.  There is physiologic liver, spleen, GI and  activity.  There is a hiatal hernia.  Pelvic organs show nothing unusual.  No bone lesions are seen.  There are areas of benign appearing lung scar"  She notes fatigue.  She denies any nausea, vomiting, diarrhea, constipation, abdominal pain, weight loss or loss of appetite, chest pain, shortness of breath, leg swelling, fatigue, pain, headache, dizziness, or mood changes. Her ECOG PS is 2. She is accompanied by her  and son     She has been on Tarceva since 6/5/18. She has now completed SBRT to her right lung lesions.     \[facility]\"" comes in to review her "PET scan reveals No FDG PET/CT findings to suggest recurrent or metastatic disease. Post radiation changes of the right upper lobe. MRI brain with no cancer. She is on Tarceva. She denies any particular issues at this time.        Review of Systems   Constitutional: Negative for appetite change, fatigue and unexpected weight change.   HENT: Negative for mouth sores.  "   Eyes: Negative for visual disturbance.   Respiratory: Negative for cough and shortness of breath.    Cardiovascular: Negative for chest pain.   Gastrointestinal: Negative for abdominal pain and diarrhea.   Genitourinary: Negative for frequency.   Musculoskeletal: Negative for back pain.   Skin: Negative for rash.   Neurological: Negative for headaches.   Hematological: Negative for adenopathy.   Psychiatric/Behavioral: The patient is not nervous/anxious.    All other systems reviewed and are negative.      PMFSH: all information reviewed and updated as relevant to today's visit  Objective:      Physical Exam   Constitutional: She is oriented to person, place, and time. She appears well-developed and well-nourished.   HENT:   Mouth/Throat: No oropharyngeal exudate.   Cardiovascular: Normal rate and normal heart sounds.   Pulmonary/Chest: Effort normal and breath sounds normal. She has no wheezes.   Abdominal: Soft. Bowel sounds are normal. There is no tenderness.   Musculoskeletal: She exhibits no edema or tenderness.   Lymphadenopathy:     She has no cervical adenopathy.   Neurological: She is alert and oriented to person, place, and time. Coordination normal.   Skin: Skin is warm and dry. No rash noted.   Psychiatric: She has a normal mood and affect. Judgment and thought content normal.   Vitals reviewed.      LABS:  WBC   Date Value Ref Range Status   08/26/2019 5.90 3.90 - 12.70 K/uL Final     Hemoglobin   Date Value Ref Range Status   08/26/2019 9.2 (L) 12.0 - 16.0 g/dL Final     POC Hematocrit   Date Value Ref Range Status   01/12/2016 25 (L) 36 - 54 %PCV Final     Hematocrit   Date Value Ref Range Status   08/26/2019 31.6 (L) 37.0 - 48.5 % Final     Platelets   Date Value Ref Range Status   08/26/2019 238 150 - 350 K/uL Final     Gran # (ANC)   Date Value Ref Range Status   08/26/2019 3.4 1.8 - 7.7 K/uL Final     Comment:     The ANC is based on a white cell differential from an   automated cell counter. It  has not been microscopically   reviewed for the presence of abnormal cells. Clinical   correlation is required.         Chemistry        Component Value Date/Time     08/27/2019 1226    K 4.9 08/27/2019 1226     (H) 08/27/2019 1226    CO2 19 (L) 08/27/2019 1226    BUN 29 (H) 08/27/2019 1226    CREATININE 2.0 (H) 08/27/2019 1226     08/27/2019 1226        Component Value Date/Time    CALCIUM 8.9 08/27/2019 1226    ALKPHOS 85 08/27/2019 1226    AST 35 08/27/2019 1226    ALT 20 08/27/2019 1226    BILITOT 1.1 (H) 08/27/2019 1226    ESTGFRAFRICA 27.7 (A) 08/27/2019 1226    EGFRNONAA 24.1 (A) 08/27/2019 1226           Assessment:       1. Malignant neoplasm of lower lobe of right lung    2. Secondary cancer of bone    3. CKD (chronic kidney disease) stage 3, GFR 30-59 ml/min        Plan:        1,2. She is doing very well on Tarceva with complete response on scans. She will continue with Tarceva. Xgeva today based on labs. Will schedule monthly labs and Xgeva and will see her in 3 months with labs and PET scan  3. Stable. monitor  Above care plan was discussed with patient and all questions were addressed to her satisfaction

## 2019-09-03 LAB
CREAT SERPL-MCNC: 1.8 MG/DL (ref 0.5–1.4)
SAMPLE: ABNORMAL

## 2019-09-11 DIAGNOSIS — L70.8 ACNEIFORM RASH: Primary | ICD-10-CM

## 2019-09-11 RX ORDER — DOXYCYCLINE HYCLATE 100 MG
100 TABLET ORAL EVERY 12 HOURS
Qty: 60 TABLET | Refills: 3 | Status: ON HOLD | OUTPATIENT
Start: 2019-09-11 | End: 2020-01-21 | Stop reason: HOSPADM

## 2019-09-11 NOTE — TELEPHONE ENCOUNTER
Spoke with son---patient is requesting a refill on doxycycline to be sent to [facility] on file.  Nurse informed him she would ask dr sullivan to send that over this afternoon. He thanked nurse.      Message routed to dr sullivan

## 2019-09-11 NOTE — TELEPHONE ENCOUNTER
----- Message from Alina Sigala sent at 9/11/2019  1:14 PM CDT -----  Contact: Basil (son)  Staff Message     Caller name: Basil    Reason for call: Wants to speak with the nurse, has questions concerning Rx doxycycline that was prescribed to the pt for a reaction to Rx tarceva.        Communication Preference: [phone]    Additional Information:

## 2019-09-13 ENCOUNTER — TELEPHONE (OUTPATIENT)
Dept: INFUSION THERAPY | Facility: HOSPITAL | Age: 74
End: 2019-09-13

## 2019-09-13 ENCOUNTER — TELEPHONE (OUTPATIENT)
Dept: HEMATOLOGY/ONCOLOGY | Facility: CLINIC | Age: 74
End: 2019-09-13

## 2019-09-13 NOTE — TELEPHONE ENCOUNTER
----- Message from Alina Sigala sent at 9/13/2019  4:29 PM CDT -----  Contact: basil (son)  Staff Message     Caller name: Basil    Reason for call: Needs to speak with the nurse concerning the antibiotic that was called in to the pharmacy. Say that is was the wrong medication.        Communication Preference: [phone]    Additional Information:

## 2019-09-13 NOTE — TELEPHONE ENCOUNTER
Attempted to reach Basil left message letting him know that no ABX had been sent in for his mother from us.

## 2019-09-17 ENCOUNTER — TELEPHONE (OUTPATIENT)
Dept: HEMATOLOGY/ONCOLOGY | Facility: CLINIC | Age: 74
End: 2019-09-17

## 2019-09-17 DIAGNOSIS — L27.0 ACNEIFORM DRUG ERUPTION: ICD-10-CM

## 2019-09-17 RX ORDER — CLINDAMYCIN PHOSPHATE 10 MG/G
GEL TOPICAL
Qty: 60 G | Refills: 6 | Status: SHIPPED | OUTPATIENT
Start: 2019-09-17 | End: 2020-05-12 | Stop reason: SDUPTHER

## 2019-09-17 NOTE — TELEPHONE ENCOUNTER
06/18/19 2040   Dual Skin Pressure Injury Assessment   Dual Skin Pressure Injury Assessment WDL   Second Care Provider (Based on Facility Policy) Brant Carreon RN   Skin Integumentary   Skin Integumentary (WDL) X   Skin Color Appropriate for ethnicity   Skin Condition/Temp Warm;Dry   Skin Integrity Incision (comment)   Turgor Non-tenting    Pressure  Injury Documentation No Pressure Injury Noted-Pressure Ulcer Prevention Initiated   Wound Prevention and Protection Methods   Orientation of Wound Prevention Posterior   Location of Wound Prevention Sacrum/Coccyx   Dressing Present  Yes   Dressing Status Intact   Wound Offloading (Prevention Methods) Foam silicone;Bed, pressure reduction mattress     No pressure injuries noted at this time. Left vm for son to contact clinic at his convenience

## 2019-09-17 NOTE — PT/OT/SLP PROGRESS
Physical Therapy      Naty Jessica St  MRN: [id number]    PT eval and treat orders received and acknowledged.  Eval attempted at 1005 but patient off floor for testing.  Will follow up today schedule permitting.     Ivett Nance, PT   10/9/2017       Treatments:  PROM / STM / Oscillations-Mobs:  G-I, II, III, IV (PA's, Inf., Post.)  [] (27613) Provided manual therapy to mobilize soft tissue/joints of cervical/CT, scapular GHJ and UE for the purpose of modulating pain, promoting relaxation,  increasing ROM, reducing/eliminating soft tissue swelling/inflammation/restriction, improving soft tissue extensibility and allowing for proper ROM for normal function with self care, reaching, carrying, lifting, house/yardwork, driving/computer work    Modalities:  CPx15 min right shoulder and medial elbow    Charges:  Timed Code Treatment Minutes: 45   Total Treatment Minutes: 60     BWC time in/time out:     [] EVAL (LOW) 16436 (typically 20 minutes face-to-face)  [] EVAL (MOD) 92759 (typically 30 minutes face-to-face)  [] EVAL (HIGH) 27384 (typically 45 minutes face-to-face)  [] RE-EVAL     [x] TP(04428) x   2  [] IONTO  [x] NMR (57932) x 1    [] VASO  [] Manual (88064) x      [x] Other:CP  [] TA x      [] Mech Traction (44361)  [] ES(attended) (43400)      [] ES (un) (57765):     GOALS:  Short Term Goals: To be achieved in: 2 weeks  1. Independent in HEP and progression per patient tolerance, in order to prevent re-injury. Improved  2. Patient will have a decrease in pain to facilitate improvement in movement, function, and ADLs as indicated by Functional Deficits.     Long Term Goals: To be achieved in: 4-6 weeks  1. Disability index score of 0% or less for the [facility] to assist with reaching prior level of function. 2. Patient will demonstrate increased AROM right shoulder  To 60 deg Int rot @ 90 abd allow for proper joint functioning as indicated by patients Functional Deficits. 3. Patient will demonstrate an increase in Strength to 4+/5 to allow for proper functional mobility as indicated by patients Functional Deficits. 4. Patient will return to all 4's plyometric activities without increased symptoms or restriction.    5.  Transition to [facility] for return to

## 2019-09-17 NOTE — TELEPHONE ENCOUNTER
----- Message from Alina Sigala sent at 9/17/2019  2:02 PM CDT -----  Contact: Basil  Returning a call     Who called:: Zandra    Nature of call:: Questions concerning mother's medication    Communication preference:: [phone]    Additional info::

## 2019-09-17 NOTE — TELEPHONE ENCOUNTER
----- Message from Penny Cohen sent at 9/17/2019 12:32 PM CDT -----  Contact: Pt   Pt son Basil called to speak with nurse wen have some questions   Callback#[phone]  Thank You  LOLLY Cohen

## 2019-09-24 ENCOUNTER — INFUSION (OUTPATIENT)
Dept: INFUSION THERAPY | Facility: HOSPITAL | Age: 74
End: 2019-09-24
Attending: INTERNAL MEDICINE
Payer: MEDICARE

## 2019-09-24 DIAGNOSIS — C34.31 MALIGNANT NEOPLASM OF LOWER LOBE OF RIGHT LUNG: Primary | ICD-10-CM

## 2019-09-24 PROCEDURE — 96372 THER/PROPH/DIAG INJ SC/IM: CPT | Mod: HCNC

## 2019-09-24 PROCEDURE — 63600175 PHARM REV CODE 636 W HCPCS: Mod: JG,HCNC | Performed by: INTERNAL MEDICINE

## 2019-09-24 RX ADMIN — DENOSUMAB 120 MG: 120 INJECTION SUBCUTANEOUS at 01:09

## 2019-09-30 ENCOUNTER — OFFICE VISIT (OUTPATIENT)
Dept: PSYCHIATRY | Facility: CLINIC | Age: 74
End: 2019-09-30
Payer: MEDICARE

## 2019-09-30 ENCOUNTER — TELEPHONE (OUTPATIENT)
Dept: HEMATOLOGY/ONCOLOGY | Facility: CLINIC | Age: 74
End: 2019-09-30

## 2019-09-30 DIAGNOSIS — C34.31 MALIGNANT NEOPLASM OF LOWER LOBE OF RIGHT LUNG: Primary | ICD-10-CM

## 2019-09-30 DIAGNOSIS — F32.A DEPRESSION, UNSPECIFIED DEPRESSION TYPE: ICD-10-CM

## 2019-09-30 DIAGNOSIS — F43.21 ADJUSTMENT DISORDER WITH DEPRESSED MOOD: Primary | ICD-10-CM

## 2019-09-30 PROCEDURE — 99999 PR PBB SHADOW E&M-EST. PATIENT-LVL II: CPT | Mod: PBBFAC,HCNC,, | Performed by: PSYCHOLOGIST

## 2019-09-30 PROCEDURE — 90791 PR PSYCHIATRIC DIAGNOSTIC EVALUATION: ICD-10-PCS | Mod: HCNC,S$GLB,, | Performed by: PSYCHOLOGIST

## 2019-09-30 PROCEDURE — 99999 PR PBB SHADOW E&M-EST. PATIENT-LVL II: ICD-10-PCS | Mod: PBBFAC,HCNC,, | Performed by: PSYCHOLOGIST

## 2019-09-30 PROCEDURE — 90791 PSYCH DIAGNOSTIC EVALUATION: CPT | Mod: HCNC,S$GLB,, | Performed by: PSYCHOLOGIST

## 2019-09-30 NOTE — TELEPHONE ENCOUNTER
----- Message from Karrie Vazquez MD sent at 9/30/2019 11:39 AM CDT -----  I can refer her to Psychiatry   ----- Message -----  From: Tano Quintero, PhD  Sent: 9/30/2019  10:55 AM CDT  To: MD Orestes Keith  I saw Ms. St for the first time in 2.5 years today. She is much more depressed and anxious than previously. Her 50 mg Elavil seems to have helped her insomnia, but may not be a high enough dose to treat mood/anxiety.  Could you re-evaluate that or maybe consider an adjunctive med?  Thanks!  Tano
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
## 2019-10-17 ENCOUNTER — TELEPHONE (OUTPATIENT)
Dept: HEMATOLOGY/ONCOLOGY | Facility: CLINIC | Age: 74
End: 2019-10-17

## 2019-10-17 NOTE — TELEPHONE ENCOUNTER
Spoke with shirley. Scheduled all recall appointments for patient.  He wanted nurse to double check with dr sullivan, the frequency she wants the patient to receive xgeva, as it was discussed at the last visit going to every other month. Nurse informed patient's son she would double check with dr sullivan in the morning--and would be back in touch with him.  He thanked nurse.      Message routed to dr sullivan (do you recall having this conversation?)

## 2019-10-17 NOTE — TELEPHONE ENCOUNTER
----- Message from Farhana Neville sent at 10/17/2019  2:38 PM CDT -----  Contact: Mr Gracia /   Son [phone]  Type:  Mr Gracia states need to speak nurse Ms Zandra have questions regarding patient appointments and injection.      Name of Caller: Mr Gracia   When is the first available appointment?  Symptoms:  Would the patient rather a call back or a response via [facility]? call  Best Call Back Number:[phone]  Additional Information:

## 2019-10-18 NOTE — TELEPHONE ENCOUNTER
"Informed son patient's xgeva is monthly. He voiced understanding.      He notes mom being in the hospital in [place], as she "may have" have a seizure, so they are keeping her overnight for observation.    Message routed to dr sullivan (just FYI)  "

## 2019-10-29 ENCOUNTER — TELEPHONE (OUTPATIENT)
Dept: HEMATOLOGY/ONCOLOGY | Facility: CLINIC | Age: 74
End: 2019-10-29

## 2019-10-29 DIAGNOSIS — R60.9 EDEMA, UNSPECIFIED TYPE: ICD-10-CM

## 2019-10-29 DIAGNOSIS — C79.31 BRAIN METASTASES: Primary | ICD-10-CM

## 2019-10-29 DIAGNOSIS — M79.89 LEG SWELLING: ICD-10-CM

## 2019-10-29 NOTE — TELEPHONE ENCOUNTER
Spoke with patient's son. He is calling to see if MRI brain needs to be scheduled in December along with petscan.   Last MRI brain was 8/2019.    Patient is home from [place] as of yesterday--and is having bilateral below the knee to ankle swelling with pain in her calves x a couple weeks. The pain is intermittent. She denies SOB any worse than baseline. Last ECHO was 4/2018.    Nurse informed son she would contact him back after speaking with dr sullivan. He thanked nurse.      Message routed to dr sullivan (should I schedule her for US lower extremities?)

## 2019-10-29 NOTE — TELEPHONE ENCOUNTER
----- Message from Ruma Roldan sent at 10/29/2019 12:30 PM CDT -----  Type:  Needs Medical Advice    Who Called: Basil pt son    Symptoms (please be specific): Swelling in feet and ankles, shooting pain in legs    How long has patient had these symptoms: Few weeks     Would the patient rather a call back or a response via [facility]? Callback    Best Call Back Number: [phone] (Basil)    Additional Information: Want to schedule an appt for the pt to see the doctor soon

## 2019-10-30 ENCOUNTER — HOSPITAL ENCOUNTER (OUTPATIENT)
Dept: RADIOLOGY | Facility: HOSPITAL | Age: 74
Discharge: HOME | End: 2019-10-30
Attending: INTERNAL MEDICINE
Payer: MEDICARE

## 2019-10-30 DIAGNOSIS — M79.89 LEG SWELLING: ICD-10-CM

## 2019-10-30 DIAGNOSIS — R60.9 EDEMA, UNSPECIFIED TYPE: ICD-10-CM

## 2019-10-30 PROCEDURE — 93970 US LOWER EXTREMITY VEINS BILATERAL: ICD-10-PCS | Mod: 26,HCNC,, | Performed by: RADIOLOGY

## 2019-10-30 PROCEDURE — 93970 EXTREMITY STUDY: CPT | Mod: 26,HCNC,, | Performed by: RADIOLOGY

## 2019-10-30 PROCEDURE — 93970 EXTREMITY STUDY: CPT | Mod: TC,HCNC

## 2019-10-31 ENCOUNTER — TELEPHONE (OUTPATIENT)
Dept: HEMATOLOGY/ONCOLOGY | Facility: CLINIC | Age: 74
End: 2019-10-31

## 2019-10-31 NOTE — TELEPHONE ENCOUNTER
----- Message from Karrie Vazquez MD sent at 10/30/2019  4:27 PM CDT -----  Ultrasound negative for blood clots

## 2019-10-31 NOTE — TELEPHONE ENCOUNTER
Notified patient that the ultrasound of her legs showed no blood clots. Patient did not have any other questions at this time.

## 2019-10-31 NOTE — TELEPHONE ENCOUNTER
----- Message from Penny Cohen sent at 10/31/2019  8:26 AM CDT -----  Contact: Pt  Type:  Patient Returning Call    Who Called:Pt   Who Left Message for Patient:Nurse Zandra   Does the patient know what this is regarding?:Test Results   Would the patient rather a call back or a response via [facility]?  Callback  Best Call Back Number:[phone]  Additional Information:   Thank You  LOLLY Cohen

## 2019-11-04 ENCOUNTER — TELEPHONE (OUTPATIENT)
Dept: HEMATOLOGY/ONCOLOGY | Facility: CLINIC | Age: 74
End: 2019-11-04

## 2019-11-04 NOTE — TELEPHONE ENCOUNTER
Spoke with PT to reschedule lab and injection appointments for today due to her not felling well (cough). Rescheduled appointments to Monday, Nov., 11.

## 2019-11-04 NOTE — TELEPHONE ENCOUNTER
----- Message from Gilmar Ramirez sent at 11/4/2019  9:55 AM CST -----  Contact: pt  Rerouting for scheduling.    ----- Message -----  From: Alina Sigala  Sent: 11/4/2019   9:48 AM CST  To: Gilmar Ramirez    Pt calling to rescheduled 11/4 Lab and Injection       Pt contact [phone]

## 2019-11-11 ENCOUNTER — INFUSION (OUTPATIENT)
Dept: INFUSION THERAPY | Facility: HOSPITAL | Age: 74
End: 2019-11-11
Attending: INTERNAL MEDICINE
Payer: MEDICARE

## 2019-11-11 DIAGNOSIS — C34.31 MALIGNANT NEOPLASM OF LOWER LOBE OF RIGHT LUNG: Primary | ICD-10-CM

## 2019-11-11 DIAGNOSIS — C79.31 BRAIN METASTASES: Primary | ICD-10-CM

## 2019-11-11 DIAGNOSIS — C34.31 MALIGNANT NEOPLASM OF LOWER LOBE OF RIGHT LUNG: ICD-10-CM

## 2019-11-11 PROCEDURE — 63600175 PHARM REV CODE 636 W HCPCS: Mod: JG,HCNC | Performed by: INTERNAL MEDICINE

## 2019-11-11 PROCEDURE — 96372 THER/PROPH/DIAG INJ SC/IM: CPT | Mod: HCNC

## 2019-11-11 RX ADMIN — DENOSUMAB 120 MG: 120 INJECTION SUBCUTANEOUS at 03:11

## 2019-11-12 ENCOUNTER — TELEPHONE (OUTPATIENT)
Dept: HEMATOLOGY/ONCOLOGY | Facility: CLINIC | Age: 74
End: 2019-11-12

## 2019-11-12 NOTE — TELEPHONE ENCOUNTER
----- Message from Penny Cohen sent at 11/12/2019 11:18 AM CST -----  Contact: Pt son Basil   Pt son Basil called to speak with Nurse Zandra have some questions   Callback#[phone]  Thank You  LOLLY Cohen

## 2019-11-13 ENCOUNTER — CARE AT HOME (OUTPATIENT)
Dept: HOME HEALTH SERVICES | Facility: CLINIC | Age: 74
End: 2019-11-13
Payer: MEDICARE

## 2019-11-13 DIAGNOSIS — R53.1 GENERALIZED WEAKNESS: ICD-10-CM

## 2019-11-13 DIAGNOSIS — Z51.5 PALLIATIVE CARE ENCOUNTER: Primary | ICD-10-CM

## 2019-11-13 DIAGNOSIS — R64 CACHECTIC: ICD-10-CM

## 2019-11-13 DIAGNOSIS — J06.9 UPPER RESPIRATORY TRACT INFECTION, UNSPECIFIED TYPE: ICD-10-CM

## 2019-11-13 DIAGNOSIS — C34.91 ADENOCARCINOMA OF RIGHT LUNG: ICD-10-CM

## 2019-11-13 DIAGNOSIS — C34.90 MALIGNANT NEOPLASM OF LUNG, UNSPECIFIED LATERALITY, UNSPECIFIED PART OF LUNG: ICD-10-CM

## 2019-11-13 DIAGNOSIS — E44.0 MODERATE MALNUTRITION: ICD-10-CM

## 2019-11-13 DIAGNOSIS — R05.9 COUGH: ICD-10-CM

## 2019-11-13 DIAGNOSIS — C79.31 BRAIN METASTASIS: ICD-10-CM

## 2019-11-13 PROCEDURE — 99499 RISK ADDL DX/OHS AUDIT: ICD-10-PCS | Mod: S$GLB,,, | Performed by: NURSE PRACTITIONER

## 2019-11-13 PROCEDURE — 99499 UNLISTED E&M SERVICE: CPT | Mod: S$GLB,,, | Performed by: NURSE PRACTITIONER

## 2019-11-13 PROCEDURE — 1101F PR PT FALLS ASSESS DOC 0-1 FALLS W/OUT INJ PAST YR: ICD-10-PCS | Mod: CPTII,S$GLB,, | Performed by: NURSE PRACTITIONER

## 2019-11-13 PROCEDURE — 3074F SYST BP LT 130 MM HG: CPT | Mod: CPTII,S$GLB,, | Performed by: NURSE PRACTITIONER

## 2019-11-13 PROCEDURE — 3078F PR MOST RECENT DIASTOLIC BLOOD PRESSURE < 80 MM HG: ICD-10-PCS | Mod: CPTII,S$GLB,, | Performed by: NURSE PRACTITIONER

## 2019-11-13 PROCEDURE — 1159F PR MEDICATION LIST DOCUMENTED IN MEDICAL RECORD: ICD-10-PCS | Mod: S$GLB,,, | Performed by: NURSE PRACTITIONER

## 2019-11-13 PROCEDURE — 1159F MED LIST DOCD IN RCRD: CPT | Mod: S$GLB,,, | Performed by: NURSE PRACTITIONER

## 2019-11-13 PROCEDURE — 99349 PR HOME VISIT,ESTAB PATIENT,LEVEL III: ICD-10-PCS | Mod: S$GLB,,, | Performed by: NURSE PRACTITIONER

## 2019-11-13 PROCEDURE — 3074F PR MOST RECENT SYSTOLIC BLOOD PRESSURE < 130 MM HG: ICD-10-PCS | Mod: CPTII,S$GLB,, | Performed by: NURSE PRACTITIONER

## 2019-11-13 PROCEDURE — 1101F PT FALLS ASSESS-DOCD LE1/YR: CPT | Mod: CPTII,S$GLB,, | Performed by: NURSE PRACTITIONER

## 2019-11-13 PROCEDURE — 3078F DIAST BP <80 MM HG: CPT | Mod: CPTII,S$GLB,, | Performed by: NURSE PRACTITIONER

## 2019-11-13 PROCEDURE — 99349 HOME/RES VST EST MOD MDM 40: CPT | Mod: S$GLB,,, | Performed by: NURSE PRACTITIONER

## 2019-11-13 RX ORDER — BENZONATATE 100 MG/1
100 CAPSULE ORAL 3 TIMES DAILY PRN
Qty: 60 CAPSULE | Refills: 0 | Status: SHIPPED | OUTPATIENT
Start: 2019-11-13 | End: 2019-11-23

## 2019-11-13 RX ORDER — DOXYCYCLINE 100 MG/1
100 CAPSULE ORAL EVERY 12 HOURS
Qty: 14 CAPSULE | Refills: 1 | Status: ON HOLD | OUTPATIENT
Start: 2019-11-13 | End: 2020-01-21 | Stop reason: HOSPADM

## 2019-11-13 RX ORDER — IPRATROPIUM BROMIDE AND ALBUTEROL SULFATE 2.5; .5 MG/3ML; MG/3ML
3 SOLUTION RESPIRATORY (INHALATION) EVERY 6 HOURS PRN
Qty: 1 BOX | Refills: 3 | Status: SHIPPED | OUTPATIENT
Start: 2019-11-13 | End: 2021-01-01

## 2019-11-13 NOTE — PROGRESS NOTES
[facility]  Palliative Care Home Visit    Visit Date: 2019  Encounter Provider: Kiya Landin DNP, FNP-C  PCP:  Olvin Mars MD    Subjective:      Patient ID: Naty St is a 74 y.o. female.    Consult Requested By:  ??  Reason for Consult:  Palliative Care      Chief Complaint:  Establish Palliative Care    Outpatient Palliative Care Encounter:    Ms. Naty St is a 75 y/o female with PMHx of Lung Cancer metastatic to brain, pancreatic cancer (), breast cancer (), hypertension, blood clot in vein (on anticoagulant long-term use), seizures, stroke, psychiatric problem, brain surgery with tumor removal (2016), cholecystectomy, whipple (), oophorectomy (), hysterectomy (), and kidney stone surgery.     Patient is seen today to establish palliative care. Patient resides at home with her  and son. She reports she has been  for 31 years and has 4 children (not with current ). She has 2 siblings, 1 sister (lives in [place]) and 1 brother (). Patient reports she worked as a , but she is currently retired. Patient reports she was in [place] 10/13 when she fell and hit the back of her head. CT scan was performed, which was negative. She reports at the end of October she was in [place] when she developed a low grade fever and was hospitalized for 3 days.     Goals of Care:    Reviewed [employer] form and [facility] Advance Care Directives form with patient. She would like to review all the forms before signing. Discussed Palliative care and hospice with the patient. She would like to continue palliative care at this time.   Code Status Full code at this time.    PLAN:  1. Palliative care to follow-up with patient in 4 weeks  2. Rx Doxycycline 100mg po BID x 7 days  3. Rx Tessalon Perles 100mg TID prn cough  4. Portable CXR r/o pneumonia  5. Duo nebs Q 6 hours prn SOB/Wheezing  6. Nebulizer machine and supplies ordered  7.  Worsening symptoms call PCP's office or Palliative Care NP  8. Recommend OTC Claritin or Zyrtec daily    Review of Systems   Constitutional: Negative for activity change, appetite change, chills, fatigue and fever.   HENT: Positive for congestion, rhinorrhea and sneezing. Negative for postnasal drip, sinus pressure, sinus pain, sore throat and trouble swallowing.    Eyes: Positive for visual disturbance (glasses).        Eyes itchy and watery   Respiratory: Positive for cough (greenish sputum x 1-2 weeks). Negative for choking, chest tightness, shortness of breath and wheezing.    Cardiovascular: Positive for palpitations and leg swelling. Negative for chest pain.   Gastrointestinal: Negative for abdominal distention, abdominal pain, blood in stool, constipation, diarrhea, nausea and vomiting.   Endocrine: Negative for polyuria.   Genitourinary: Negative for difficulty urinating and hematuria.   Musculoskeletal: Positive for gait problem. Negative for arthralgias, back pain, joint swelling, myalgias, neck pain and neck stiffness.   Skin: Positive for pallor. Negative for rash and wound.   Neurological: Positive for weakness (generalized weakness). Negative for dizziness, tremors, seizures, syncope, light-headedness and headaches.   Hematological: Bruises/bleeds easily.   Psychiatric/Behavioral: Positive for self-injury. Negative for confusion, hallucinations, sleep disturbance and suicidal ideas. The patient is nervous/anxious.        Assessments:  · Environmental: single story home; good lighting; uncluttered; lives with spouse and son  · Functional Status: independent of all ADLs,   · Safety: someone present at all times  · Nutritional: 3 meals; snacks, protein shakes  · Home Health/DME/Supplies: cane, tub chair, toilet chair, walker    Symptom Assessment (ESAS 0-10 scale)     ESAS 0 1 2 3 4 5 6 7 8 9 10   Pain X             Dyspnea X             Anxiety      X        Nausea X             Depression  X              Anorexia     X         Fatigue X             Insomnia X             Restlessness  X             Agitation X               Constipation    no  Bowel Management Plan (BMP): no  Diarrhea        no  Comments: don't need anything; regular BMs;  LBM 11/12    Performance Status:   PPS Score 60  Karnofsky Score:  60  EGOC:  2    History:  Past Medical History:   Diagnosis Date    Anticoagulant long-term use     Blood clot in vein 06/2016    Breast cancer 1994    Cataract     Hypertension     Lung cancer     Lung cancer metastatic to brain     Pancreatic cancer 1998    Posterior capsular opacification, left eye     Psychiatric problem     Seizures 01/25/2016    Stroke     Therapy      Family History   Problem Relation Age of Onset    Breast cancer Mother     Lung cancer Mother     Leukemia Paternal Grandfather     Stomach cancer Paternal Grandmother     Colon cancer Neg Hx     Ovarian cancer Neg Hx      Past Surgical History:   Procedure Laterality Date    BONE BIOSPY  3/9/16    BRAIN SURGERY  1/12/16    tumor removal 1/12/16    BREAST LUMPECTOMY  1998    left    CATARACT EXTRACTION Bilateral 2004    yag OU    CHOLECYSTECTOMY  1998    COLONOSCOPY N/A 11/13/2015    Procedure: COLONOSCOPY;  Surgeon: Alex Carmona MD;  Location: [facility] ([address] [place] [address]);  Service: Endoscopy;  Laterality: N/A;  2 year f/u    COLONOSCOPY N/A 11/7/2018    Procedure: COLONOSCOPY;  Surgeon: Jim Raman MD;  Location: [facility] ([address] [place] [address]);  Service: Endoscopy;  Laterality: N/A;  Eliquis - per Dr. Vazquez -ok to hold Eliquis x 2 days prior to colon- ERW    cysto and right ureteral stent  4/27/12    ecoli  2010    removal of blockage, done throat, then through the liver.    HYSTERECTOMY  1974    KIDNEY STONE SURGERY  2010--laser    left eswl  6/20/12    OOPHORECTOMY  4/25/2012    Laparoscopic BSO, lysis of adhesions, cystoscopy greater than 35mins     WHIPPLE PROCEDURE W/ LAPAROSCOPY  1998     Review of patient's  allergies indicates:   Allergen Reactions    Tobradex [tobramycin-dexamethasone] Swelling    Iodinated contrast media Hives    Latex Rash and Hives    Phenytoin sodium extended Other (See Comments) and Rash       Medications:    Current Outpatient Medications:     amitriptyline (ELAVIL) 50 MG tablet, Take 1 tablet (50 mg total) by mouth every evening., Disp: 90 tablet, Rfl: 12    atorvastatin (LIPITOR) 40 MG tablet, Take 1 tablet (40 mg total) by mouth once daily., Disp: 90 tablet, Rfl: 12    clindamycin phosphate 1% (CLINDAGEL) 1 % gel, APPLY TOPICALLY TWICE DAILY, Disp: 60 g, Rfl: 6    CREON CpDR, TAKE ONE CAPSULE BY MOUTH THREE TIMES A DAY WITH MEALS, Disp: 270 capsule, Rfl: 3    denosumab (XGEVA) 120 mg/1.7 mL (70 mg/mL) Soln, Inject 120 mg into the skin every 28 days., Disp: , Rfl:     dronabinol (MARINOL) 5 MG capsule, Take 1 capsule (5 mg total) by mouth 2 (two) times daily before meals., Disp: 60 capsule, Rfl: 3    ELIQUIS 5 mg Tab, TAKE ONE TABLET BY MOUTH TWICE DAILY, Disp: 60 tablet, Rfl: 6    erlotinib (TARCEVA) 150 MG tablet, Take 1 tablet (150 mg total) by mouth once daily., Disp: 30 tablet, Rfl: 11    levETIRAcetam (KEPPRA) 500 MG Tab, TAKE ONE TABLET BY MOUTH TWICE DAILY, Disp: 180 tablet, Rfl: 3    LORazepam (ATIVAN) 1 MG tablet, Take 1 tablet (1 mg total) by mouth 2 (two) times daily., Disp: 60 tablet, Rfl: 5    losartan (COZAAR) 50 MG tablet, TAKE ONE TABLET BY MOUTH TWICE DAILY, Disp: 60 tablet, Rfl: 10    metoprolol succinate (TOPROL-XL) 50 MG 24 hr tablet, TAKE ONE TABLET BY MOUTH ONCE DAILY. hold UNTIL your appointment WITH your pcp. heart rate and blood pressure has been normal off OF this me, Disp: 30 tablet, Rfl: 11    multivitamin (THERAGRAN) per tablet, Take 1 tablet by mouth once daily., Disp: , Rfl:     MYRBETRIQ 50 mg Tb24, TAKE ONE TABLET BY MOUTH ONCE DAILY, Disp: 30 tablet, Rfl: 11    polyethylene glycol (GLYCOLAX) 17 gram/dose powder, , Disp: , Rfl:      albuterol-ipratropium (DUO-NEB) 2.5 mg-0.5 mg/3 mL nebulizer solution, Take 3 mLs by nebulization every 6 (six) hours as needed for Wheezing or Shortness of Breath. Rescue, Disp: 1 Box, Rfl: 3    doxycycline (VIBRA-TABS) 100 MG tablet, Take 1 tablet (100 mg total) by mouth every 12 (twelve) hours. (Patient not taking: Reported on 11/13/2019), Disp: 60 tablet, Rfl: 3    doxycycline (VIBRAMYCIN) 100 MG Cap, Take 1 capsule (100 mg total) by mouth every 12 (twelve) hours., Disp: 14 capsule, Rfl: 1    senna-docusate 8.6-50 mg (SENNA LAXATIVE-STOOL SOFTENER) 8.6-50 mg per tablet, Take 1 tablet by mouth once daily. (Patient not taking: Reported on 11/13/2019), Disp: , Rfl:   No current facility-administered medications for this visit.     Facility-Administered Medications Ordered in Other Visits:     denosumab (XGEVA) solution 120 mg, 120 mg, Subcutaneous, 1 time in Clinic/HOD, Karrie Vazquez MD    24h Oral Morphine Equivalents (OME):  n/a    Objective:     Physical Exam:    There is no height or weight on file to calculate BMI.    Physical Exam   Constitutional: She is oriented to person, place, and time. She appears well-developed and well-nourished.   Sitting on sofa in living room. Awake, alert, and oriented x 3; forgetful; appears to not feel well.   HENT:   Head: Normocephalic and atraumatic.   Nose: Nose normal.   Mouth/Throat: No oropharyngeal exudate.   Eyes: Conjunctivae and EOM are normal. No scleral icterus.   Neck: Normal range of motion. Neck supple. No JVD present. No thyromegaly present.   Cardiovascular: Normal rate, regular rhythm and intact distal pulses. Exam reveals no gallop.   No murmur heard.  Pulmonary/Chest: Effort normal. No respiratory distress. She has wheezes (Expiratory wheezing). She has rales. She exhibits no tenderness.   SOB with exertion; + cough. Coughing up green mucus x 1-2 weeks; BBS with Rhonchi to right side of chest   Abdominal: Soft. Bowel sounds are normal. She exhibits no  distension. There is no tenderness. No hernia.   Musculoskeletal: Normal range of motion. She exhibits no edema or tenderness.   Neurological: She is alert and oriented to person, place, and time.   Forgetful at times   Skin: Skin is warm and dry. Capillary refill takes less than 2 seconds. No rash noted. No erythema. There is pallor.   Psychiatric: She has a normal mood and affect. Her behavior is normal. Judgment and thought content normal.   Nursing note and vitals reviewed.      Labs:  CBC:   WBC   Date Value Ref Range Status   11/11/2019 5.38 3.90 - 12.70 K/uL Final       Hemoglobin   Date Value Ref Range Status   11/11/2019 8.7 (L) 12.0 - 16.0 g/dL Final       POC Hematocrit   Date Value Ref Range Status   01/12/2016 25 (L) 36 - 54 %PCV Final     Hematocrit   Date Value Ref Range Status   11/11/2019 30.9 (L) 37.0 - 48.5 % Final       Mean Corpuscular Volume   Date Value Ref Range Status   11/11/2019 100 (H) 82 - 98 fL Final       Platelets   Date Value Ref Range Status   11/11/2019 259 150 - 350 K/uL Final       LFT:   Lab Results   Component Value Date    AST 43 (H) 11/11/2019    ALKPHOS 257 (H) 11/11/2019    BILITOT 1.1 (H) 11/11/2019       Albumin:   Albumin   Date Value Ref Range Status   11/11/2019 2.4 (L) 3.5 - 5.2 g/dL Final     Protein:   Total Protein   Date Value Ref Range Status   11/11/2019 5.4 (L) 6.0 - 8.4 g/dL Final       Radiology:  I have reviewed all pertinent imaging results/findings within the past 24 hours.    Psychosocial/Cultural/Spiritual:  · F- [place] and Belief:  Spiritism   · I - Importance: yes  · C - Community: [facility] [employer]  · A - Address in Care: Goes to [employer] occasionally; no spiritual needs identified      Assessment:     1. Palliative care encounter    2. Upper respiratory tract infection, unspecified type    3. Malignant neoplasm of lung, unspecified laterality, unspecified part of lung    4. Cough    5. Brain metastasis    6. Moderate malnutrition     7. Cachectic    8. Adenocarcinoma of right lung    9. Generalized weakness        Plan:     Ethical / Legal: Advance Care Planning   · Surrogate decision maker:  Name: Javier St, Relationship:   · Code Status:  DNR  · [place]:  Left form at patient's house to review  · Other advance directive: no , left [facility] Advance Directives at patient's house  · Capacity to make medical decisions:  yes,   · Conflict: none       Naty was seen today for establish care.    Diagnoses and all orders for this visit:    Palliative care encounter    Upper respiratory tract infection, unspecified type  -     X-Ray Chest 1 View; Future  -     NEBULIZER FOR HOME USE    Malignant neoplasm of lung, unspecified laterality, unspecified part of lung  -     NEBULIZER FOR HOME USE  -     NEBULIZER KIT (SUPPLIES) FOR HOME USE    Cough    Brain metastasis    Moderate malnutrition    Cachectic    Adenocarcinoma of right lung    Generalized weakness    Other orders  -     benzonatate (TESSALON) 100 MG capsule; Take 1 capsule (100 mg total) by mouth 3 (three) times daily as needed for Cough.  -     doxycycline (VIBRAMYCIN) 100 MG Cap; Take 1 capsule (100 mg total) by mouth every 12 (twelve) hours.  -     albuterol-ipratropium (DUO-NEB) 2.5 mg-0.5 mg/3 mL nebulizer solution; Take 3 mLs by nebulization every 6 (six) hours as needed for Wheezing or Shortness of Breath. Rescue        Were controlled substances prescribed?  No    > 50% of 90 min visit spent in chart review, face to face discussion of goals of care,  symptom assessment, coordination of care and emotional support.    Follow Up Appointments:   Future Appointments   Date Time Provider Department Center   11/26/2019  1:30 PM [facility] PET CT LIMIT 500 LBS [facility] PET CT [facility]   11/26/2019  3:00 PM [facility] [id number] [facility]   12/4/2019 10:10 AM LAB, HEMON CANCER BLDG [facility] LAB HO [facility]   12/4/2019 11:15 AM Karrie Vazquez MD [facility] HEM ONC [facility]    12/11/2019 10:45 AM INJECTION, [facility] INFUSION [facility] CHE[facility]       Patient consent obtained for treatment on this visit    Signature:  Kiya Landin DNP, FNP-C  [email or web address]

## 2019-11-20 ENCOUNTER — TELEPHONE (OUTPATIENT)
Dept: HEMATOLOGY/ONCOLOGY | Facility: CLINIC | Age: 74
End: 2019-11-20

## 2019-11-20 NOTE — TELEPHONE ENCOUNTER
----- Message from Debbie Ferrera sent at 11/20/2019 11:14 AM CST -----  Contact: Basil son  Pt son is asking if his mom needs to make a lab appt before her injection       Basil Contact [phone]

## 2019-11-24 VITALS
RESPIRATION RATE: 20 BRPM | OXYGEN SATURATION: 96 % | TEMPERATURE: 98 F | SYSTOLIC BLOOD PRESSURE: 110 MMHG | HEART RATE: 66 BPM | DIASTOLIC BLOOD PRESSURE: 64 MMHG

## 2019-11-25 ENCOUNTER — TELEPHONE (OUTPATIENT)
Dept: HOME HEALTH SERVICES | Facility: CLINIC | Age: 74
End: 2019-11-25

## 2019-11-25 NOTE — PATIENT INSTRUCTIONS
Instructions:    1. F/u with palliative care in 4 weeks  2. Rx for Doxycycline 100mg po BID x 7 days for upper respiratory symptoms  3. Recommend OTC Claritin or Zyrtec daily   4. Increase po Fluids  5. Continue all medications  6. Fall precautions at all times  7. Rx Tessalon Perles 100mg po TID prn cough  8. Duo nebs Q 6 hours prn wheezing and SOB  9. Portable CXR to be done at pt's home to r/o pneumonia  10. F/u with Dr. Vazquez on 12/4/2019  11. F/u with PET SCAN 11/26/2019  12. Worsening symptoms call PCP's office or Palliative care NP

## 2019-11-25 NOTE — TELEPHONE ENCOUNTER
11/15/2019    Called patient and notified her of CXR results; impression was it did not show pneumonia.   Pt reports still coughing up green. Order to continue Abx x 7 days. Stated she tried only 1 respiratory. Use prn SOB/wheezing    If worsening symptoms,call Palliative care NP or PCP's office.    Kiya Landin, PADMINI, FNP-C  [email or web address]

## 2019-11-26 ENCOUNTER — HOSPITAL ENCOUNTER (OUTPATIENT)
Dept: RADIOLOGY | Facility: HOSPITAL | Age: 74
Discharge: HOME | End: 2019-11-26
Attending: INTERNAL MEDICINE
Payer: MEDICARE

## 2019-11-26 DIAGNOSIS — C79.31 BRAIN METASTASES: ICD-10-CM

## 2019-11-26 DIAGNOSIS — C34.31 MALIGNANT NEOPLASM OF LOWER LOBE OF RIGHT LUNG: ICD-10-CM

## 2019-11-26 LAB — POCT GLUCOSE: 73 MG/DL (ref 70–110)

## 2019-11-26 PROCEDURE — 70553 MRI BRAIN STEM W/O & W/DYE: CPT | Mod: 26,HCNC,, | Performed by: RADIOLOGY

## 2019-11-26 PROCEDURE — A9585 GADOBUTROL INJECTION: HCPCS | Mod: HCNC | Performed by: INTERNAL MEDICINE

## 2019-11-26 PROCEDURE — 70553 MRI BRAIN STEM W/O & W/DYE: CPT | Mod: TC,HCNC

## 2019-11-26 PROCEDURE — 78815 PET IMAGE W/CT SKULL-THIGH: CPT | Mod: TC,HCNC,PS

## 2019-11-26 PROCEDURE — 70553 MRI BRAIN W WO CONTRAST: ICD-10-PCS | Mod: 26,HCNC,, | Performed by: RADIOLOGY

## 2019-11-26 PROCEDURE — 78815 NM PET CT ROUTINE: ICD-10-PCS | Mod: 26,HCNC,PS, | Performed by: RADIOLOGY

## 2019-11-26 PROCEDURE — 78815 PET IMAGE W/CT SKULL-THIGH: CPT | Mod: 26,HCNC,PS, | Performed by: RADIOLOGY

## 2019-11-26 PROCEDURE — 25500020 PHARM REV CODE 255: Mod: HCNC | Performed by: INTERNAL MEDICINE

## 2019-11-26 PROCEDURE — A9552 F18 FDG: HCPCS | Mod: HCNC

## 2019-11-26 RX ORDER — GADOBUTROL 604.72 MG/ML
7 INJECTION INTRAVENOUS
Status: COMPLETED | OUTPATIENT
Start: 2019-11-26 | End: 2019-11-26

## 2019-11-26 RX ADMIN — GADOBUTROL 7 ML: 604.72 INJECTION INTRAVENOUS at 04:11

## 2019-11-29 LAB
CREAT SERPL-MCNC: 1.7 MG/DL (ref 0.5–1.4)
SAMPLE: ABNORMAL

## 2019-12-02 NOTE — ED NOTES
Dr. East, along with Hem/Onc team at bedside.    Provided SBIRT services: Full screen positive. Brief Intervention Performed. Screening results were reviewed with the patient and patient was provided information about healthy guidelines and potential negative consequences associated with level of risk. Motivation and readiness to reduce or stop use was discussed and goals and activities to make changes were suggested/offered. Provided SBIRT services: Full screen positive. Brief Intervention Performed. Screening results were reviewed with the patient and patient was provided information about healthy guidelines and potential negative consequences associated with level of risk. Motivation and readiness to reduce or stop use was discussed and goals and activities to make changes were suggested/offered.    Offered the patient information on [employer] online platform.

## 2019-12-04 ENCOUNTER — LAB VISIT (OUTPATIENT)
Dept: LAB | Facility: HOSPITAL | Age: 74
End: 2019-12-04
Attending: INTERNAL MEDICINE
Payer: MEDICARE

## 2019-12-04 ENCOUNTER — OFFICE VISIT (OUTPATIENT)
Dept: HEMATOLOGY/ONCOLOGY | Facility: CLINIC | Age: 74
End: 2019-12-04
Payer: MEDICARE

## 2019-12-04 VITALS
WEIGHT: 140.44 LBS | BODY MASS INDEX: 22.57 KG/M2 | RESPIRATION RATE: 17 BRPM | DIASTOLIC BLOOD PRESSURE: 61 MMHG | TEMPERATURE: 99 F | SYSTOLIC BLOOD PRESSURE: 129 MMHG | HEIGHT: 66 IN | OXYGEN SATURATION: 95 % | HEART RATE: 69 BPM

## 2019-12-04 DIAGNOSIS — C34.31 MALIGNANT NEOPLASM OF LOWER LOBE OF RIGHT LUNG: Primary | ICD-10-CM

## 2019-12-04 DIAGNOSIS — C79.31 BRAIN METASTASES: ICD-10-CM

## 2019-12-04 DIAGNOSIS — C34.31 MALIGNANT NEOPLASM OF LOWER LOBE OF RIGHT LUNG: ICD-10-CM

## 2019-12-04 DIAGNOSIS — N18.30 CKD (CHRONIC KIDNEY DISEASE) STAGE 3, GFR 30-59 ML/MIN: ICD-10-CM

## 2019-12-04 DIAGNOSIS — C79.51 SECONDARY CANCER OF BONE: ICD-10-CM

## 2019-12-04 LAB
ALBUMIN SERPL BCP-MCNC: 2.4 G/DL (ref 3.5–5.2)
ALP SERPL-CCNC: 236 U/L (ref 55–135)
ALT SERPL W/O P-5'-P-CCNC: 43 U/L (ref 10–44)
ANION GAP SERPL CALC-SCNC: 7 MMOL/L (ref 8–16)
AST SERPL-CCNC: 39 U/L (ref 10–40)
BILIRUB SERPL-MCNC: 0.8 MG/DL (ref 0.1–1)
BUN SERPL-MCNC: 33 MG/DL (ref 8–23)
CALCIUM SERPL-MCNC: 8.6 MG/DL (ref 8.7–10.5)
CHLORIDE SERPL-SCNC: 118 MMOL/L (ref 95–110)
CO2 SERPL-SCNC: 19 MMOL/L (ref 23–29)
CREAT SERPL-MCNC: 1.8 MG/DL (ref 0.5–1.4)
ERYTHROCYTE [DISTWIDTH] IN BLOOD BY AUTOMATED COUNT: 15.3 % (ref 11.5–14.5)
EST. GFR  (AFRICAN AMERICAN): 31.3 ML/MIN/1.73 M^2
EST. GFR  (NON AFRICAN AMERICAN): 27.1 ML/MIN/1.73 M^2
GLUCOSE SERPL-MCNC: 77 MG/DL (ref 70–110)
HCT VFR BLD AUTO: 31.9 % (ref 37–48.5)
HGB BLD-MCNC: 8.9 G/DL (ref 12–16)
IMM GRANULOCYTES # BLD AUTO: 0.03 K/UL (ref 0–0.04)
MCH RBC QN AUTO: 27.4 PG (ref 27–31)
MCHC RBC AUTO-ENTMCNC: 27.9 G/DL (ref 32–36)
MCV RBC AUTO: 98 FL (ref 82–98)
NEUTROPHILS # BLD AUTO: 3 K/UL (ref 1.8–7.7)
PLATELET # BLD AUTO: 238 K/UL (ref 150–350)
PMV BLD AUTO: 10.6 FL (ref 9.2–12.9)
POTASSIUM SERPL-SCNC: 4.2 MMOL/L (ref 3.5–5.1)
PROT SERPL-MCNC: 5.3 G/DL (ref 6–8.4)
RBC # BLD AUTO: 3.25 M/UL (ref 4–5.4)
SODIUM SERPL-SCNC: 144 MMOL/L (ref 136–145)
WBC # BLD AUTO: 5.08 K/UL (ref 3.9–12.7)

## 2019-12-04 PROCEDURE — 99215 PR OFFICE/OUTPT VISIT, EST, LEVL V, 40-54 MIN: ICD-10-PCS | Mod: HCNC,S$GLB,, | Performed by: INTERNAL MEDICINE

## 2019-12-04 PROCEDURE — 3074F PR MOST RECENT SYSTOLIC BLOOD PRESSURE < 130 MM HG: ICD-10-PCS | Mod: HCNC,CPTII,S$GLB, | Performed by: INTERNAL MEDICINE

## 2019-12-04 PROCEDURE — 99215 OFFICE O/P EST HI 40 MIN: CPT | Mod: HCNC,S$GLB,, | Performed by: INTERNAL MEDICINE

## 2019-12-04 PROCEDURE — 1126F AMNT PAIN NOTED NONE PRSNT: CPT | Mod: HCNC,S$GLB,, | Performed by: INTERNAL MEDICINE

## 2019-12-04 PROCEDURE — 85027 COMPLETE CBC AUTOMATED: CPT | Mod: HCNC

## 2019-12-04 PROCEDURE — 36415 COLL VENOUS BLD VENIPUNCTURE: CPT | Mod: HCNC

## 2019-12-04 PROCEDURE — 3078F PR MOST RECENT DIASTOLIC BLOOD PRESSURE < 80 MM HG: ICD-10-PCS | Mod: HCNC,CPTII,S$GLB, | Performed by: INTERNAL MEDICINE

## 2019-12-04 PROCEDURE — 99999 PR PBB SHADOW E&M-EST. PATIENT-LVL V: CPT | Mod: PBBFAC,HCNC,, | Performed by: INTERNAL MEDICINE

## 2019-12-04 PROCEDURE — 3074F SYST BP LT 130 MM HG: CPT | Mod: HCNC,CPTII,S$GLB, | Performed by: INTERNAL MEDICINE

## 2019-12-04 PROCEDURE — 1159F PR MEDICATION LIST DOCUMENTED IN MEDICAL RECORD: ICD-10-PCS | Mod: HCNC,S$GLB,, | Performed by: INTERNAL MEDICINE

## 2019-12-04 PROCEDURE — 99999 PR PBB SHADOW E&M-EST. PATIENT-LVL V: ICD-10-PCS | Mod: PBBFAC,HCNC,, | Performed by: INTERNAL MEDICINE

## 2019-12-04 PROCEDURE — 1126F PR PAIN SEVERITY QUANTIFIED, NO PAIN PRESENT: ICD-10-PCS | Mod: HCNC,S$GLB,, | Performed by: INTERNAL MEDICINE

## 2019-12-04 PROCEDURE — 1101F PR PT FALLS ASSESS DOC 0-1 FALLS W/OUT INJ PAST YR: ICD-10-PCS | Mod: HCNC,CPTII,S$GLB, | Performed by: INTERNAL MEDICINE

## 2019-12-04 PROCEDURE — 99499 RISK ADDL DX/OHS AUDIT: ICD-10-PCS | Mod: HCNC,S$GLB,, | Performed by: INTERNAL MEDICINE

## 2019-12-04 PROCEDURE — 3078F DIAST BP <80 MM HG: CPT | Mod: HCNC,CPTII,S$GLB, | Performed by: INTERNAL MEDICINE

## 2019-12-04 PROCEDURE — 1101F PT FALLS ASSESS-DOCD LE1/YR: CPT | Mod: HCNC,CPTII,S$GLB, | Performed by: INTERNAL MEDICINE

## 2019-12-04 PROCEDURE — 99499 UNLISTED E&M SERVICE: CPT | Mod: HCNC,S$GLB,, | Performed by: INTERNAL MEDICINE

## 2019-12-04 PROCEDURE — 80053 COMPREHEN METABOLIC PANEL: CPT | Mod: HCNC

## 2019-12-04 PROCEDURE — 1159F MED LIST DOCD IN RCRD: CPT | Mod: HCNC,S$GLB,, | Performed by: INTERNAL MEDICINE

## 2019-12-04 NOTE — PROGRESS NOTES
"Subjective:       Patient ID: Naty St is a 74 y.o. female.    Chief Complaint: Malignant neoplasm of lower lobe of right lung  Oncologic History:  Ms. Naty St was admitted to the hospital between 01/25/2016 and 01/28/2016. She has a history of pancreatic cancer 17 years ago, breast cancer and meningioma, presented to the hospital complaining of loss of consciousness. The patient apparently was in her normal state of health and she stood up to go to the bathroom and fell to the floor. The patient's daughter helped the mother up in the bathroom and noted that her mother's upper extremities were shaking and eye rolling. No reports of bowel or bladder incontinence, tongue biting or rolling. The second episode lasted about four minutes and she was extremely lethargic following that. Apparently, the patient had a meningioma resection done in the past; however, recently, underwent neurosurgical resection with Dr. 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    Additional immunohistochemical stains were performed which revealed the tumor cells to be are positive for TTF1 and negative for ER and GCDFP. The morphology and TTF1 positivity are most consistent with lung primary.    Of note, imaging scan at the end of January 2016 revealed a mass in the lung at 2 cm in the medial aspect of the apical segment of the right upper lobe abutting the mediastinum at the level of the azygous vein and abutting and possibly encasing the segmental bronchi and vessels of the apical segment of the right upper lobe and no pleural fluid was present. Also, there is an enlarged right paratracheal lymph node. No evidence of any metastatic disease at the pancreatic site with postoperative changes post Whipple disease  Her PET Scan from 2/15/16 reveal "Hypermetabolic mass in the right lung apex consistent with a " "primary malignancy. Hypermetabolic mediastinal lymph nodes consistent with metastatic disease. Right sacral hypermetabolic lesion consistent with metastatic disease, noting additional mildly sclerotic lesions in multiple vertebral bodies which do not demonstrate abnormal hypermetabolism  She underwent IR bone biopsy which revealed metastatic adenocarcinoma of lung origin. 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  She has completed focal RT to brain lesions in March 2016.    PET scan from 6/6/16 shows "Dramatic almost complete response to therapy."  Lovenox started after US lower ext 6/7/16 shows Acute complete occlusion of one of the left posterior tibial vein.Remote partial thrombus of the proximal left superficial femoral vein.  She is on Tarceva.   12/6/16 PET scan reveals "Stable right upper lobe lesion and right hilar lymph node. No new lesions identified. Trace left pleural effusion".  1/27/17 Renal u/s "Medical renal disease. Nonobstructive right nephrolithiasis"  1/31/17 PET - "Right upper lobe nodule and right hilar lymph node similar and very low grade activity.  There is no definite evidence of recurrence."   11/9/17 PET "In this patient with history of lung cancer, there is interval increase in size and hypermetabolism of a right upper lobe lung lesion concerning for recurrent disease. Additionally, there is interval appearance of a hypermetabolic paratracheal lymph node suspicious for metastatic disease"     She progressed on Tarceva She underwent EBUS on 12/5/17. Pathology revealed adenocarcinoma but T790m could not be done as quantity was not insufficient. Her PET scan from 1/30/18 revealed The patient's previously identified abnormal lesions is slightly greater uptake of FDG on today's study compared with prior exam. These findings are concerning for progression of disease"      She was hospitalized between 4/9/18-4/16/18. Her hospital course was as follows "[facility] with acute hypoxic respiratory failure. She was requiring 4 L of nc on admission. She was started on moxi for CAP. She received one dose of ceftriaxone in the ED. On 2nd day of admission she spiked a fever. Her Hb was ~7 g/dl so she was transfused 1 unit of PRBCs. Over night she developed worsening of her symptoms with increased O2 requirements to 15 L HFNC. Her CXR showed worsening congestion. There was a concern of TRALI vs TACO. New 2D echo with diastolic dysfunction. She was given IV lasix pushes as needed and was started on dexamethasone.  Her abx was escalated to vanc and cefepime. She improved with diuresis and steroids. Pulmonary were consulted. Her O2 requirements continued to improve. On 4/15 she was switched to Augmentin. PT recommended discharging her to SNF but the patient refused and she wanted to go home with home health. She qualified for home oxygen so she was discharged on 3 L nc while at rest and 6 while active. Augmentin and dexamethasone were discontinued on discharge"     She was readmitted from 4/27 to 5/3/18 with who presented to the ED with a complaint of fatigue and worsening DELA CRUZ. Pt diagnosed PNA 4/9 and was discharged on 4/16 on 3L oxygen. She was initially placed on cefepime for 3 days followed by an add'l 2 days of Augmentin. Pulm was consulted with assistance as her oxygen requirements were increasing. She was placed on oral steroids, then trailed on 80mg TID solumedrol for 2 days and will be discharged on a prednisone taper. She was also diuresed with 2 days of 40mg IV lasix with appropriate UOP resulting, improvement on repeat CXR. She was medically stable and appropriate for DC home with home health on 1L continuous O2. She will be seen for close f/u and may be able to come off oxygen at that time.      She discontinued Tagrisso in April 2018. Restaging scans from 6/4/18 revealed "Stable appearance of a spiculated right upper lobe pulmonary lesion.  Additional irregular focus " "superior to the lesion in the right lung apex is stable and may represent scar or additional lesion; although this was not hypermetabolic on prior PET-CT.  No new pulmonary lesions. 1.3 cm subcarinal lymph node, not hypermetabolic on prior PET-CT. Stable postsurgical changes of a Whipple procedure. Bilateral nonobstructing nephrolithiasis.  Stable sclerotic focus in the T5 vertebral body, possibly a bone island as this was not hypermetabolic on prior PET-CT. Small hiatal hernia. RECIST SUMMARY: Date of prior examination for comparison 01/30/2018 Lesion 1: Right upper lobe 1.3 cm Series 2 image 31 prior measurement 1.3 cm"  Bone scan also from 6/4/18 revealed no evidence of mets.     Her PET scan from 7/11/18 revealed "Right suprahilar lesion SUV max 3.76, previously 6.86. Pretracheal lymph node SUV max 2.55, previously 3.79. There is physiologic intracranial, head, and neck activity.  There is muscle activation.  There is vocal cord activation.  There is physiologic liver, spleen, GI and  activity.  There is a hiatal hernia.  Pelvic organs show nothing unusual.  No bone lesions are seen.  There are areas of benign appearing lung scar"  She notes fatigue.  She denies any nausea, vomiting, diarrhea, constipation, abdominal pain, weight loss or loss of appetite, chest pain, shortness of breath, leg swelling, fatigue, pain, headache, dizziness, or mood changes. Her ECOG PS is 2. She is accompanied by her  and son     She has been on Tarceva since 6/5/18. She has now completed SBRT to her right lung lesions.      HPI She comes in to review her MRI brain from 11/26/19 which reveals "Stable postsurgical changes of left frontal lobe mass resection.  No evidence of residual or recurrent malignancy. Right cerebellar encephalomalacia consistent with prior infarct. Mucosal thickening and layering fluid within the paranasal sinuses which may reflect acute sinusitis"  PET scan 11/26/19 reveals "New hypermetabolic " "pretracheal lymph node concerning for recurrent metastatic lung cancer. Two new small ground-glass lesions in the right upper lobe without corresponding increased radiotracer uptake, although likely too small to characterize with PET-CT.  Recommend attention on follow-up. Focal non physiologic uptake in the right hemicolon. Findings are nonspecific but could represent underlying polyp. Further evaluation could be performed with colonoscopy if clinically warranted"  She noted cough and some bronchitis in October 2019 which has since resolved. Notes fatigue.    Review of Systems   Constitutional: Negative for appetite change, fatigue and unexpected weight change.   HENT: Negative for mouth sores.    Eyes: Negative for visual disturbance.   Respiratory: Negative for cough and shortness of breath.    Cardiovascular: Negative for chest pain.   Gastrointestinal: Negative for abdominal pain and diarrhea.   Genitourinary: Negative for frequency.   Musculoskeletal: Negative for back pain.   Skin: Negative for rash.   Neurological: Negative for headaches.   Hematological: Negative for adenopathy.   Psychiatric/Behavioral: The patient is not nervous/anxious.    All other systems reviewed and are negative.      Objective:      Physical Exam   Constitutional: She is oriented to person, place, and time. She appears well-developed and well-nourished.   HENT:   Mouth/Throat: No oropharyngeal exudate.   Cardiovascular: Normal rate and normal heart sounds.   Pulmonary/Chest: Effort normal and breath sounds normal. She has no wheezes.   Abdominal: Soft. Bowel sounds are normal. There is no tenderness.   Musculoskeletal: She exhibits no edema or tenderness.   Lymphadenopathy:     She has no cervical adenopathy.   Neurological: She is alert and oriented to person, place, and time. Coordination normal.   Skin: Skin is warm and dry. No rash noted.   Psychiatric: She has a normal mood and affect. Judgment and thought content normal. "   Vitals reviewed.        LABS:  WBC   Date Value Ref Range Status   12/04/2019 5.08 3.90 - 12.70 K/uL Final     Hemoglobin   Date Value Ref Range Status   12/04/2019 8.9 (L) 12.0 - 16.0 g/dL Final     POC Hematocrit   Date Value Ref Range Status   01/12/2016 25 (L) 36 - 54 %PCV Final     Hematocrit   Date Value Ref Range Status   12/04/2019 31.9 (L) 37.0 - 48.5 % Final     Platelets   Date Value Ref Range Status   12/04/2019 238 150 - 350 K/uL Final     Gran # (ANC)   Date Value Ref Range Status   12/04/2019 3.0 1.8 - 7.7 K/uL Final     Comment:     The ANC is based on a white cell differential from an   automated cell counter. It has not been microscopically   reviewed for the presence of abnormal cells. Clinical   correlation is required.         Chemistry        Component Value Date/Time     12/04/2019 1105    K 4.2 12/04/2019 1105     (H) 12/04/2019 1105    CO2 19 (L) 12/04/2019 1105    BUN 33 (H) 12/04/2019 1105    CREATININE 1.8 (H) 12/04/2019 1105    GLU 77 12/04/2019 1105        Component Value Date/Time    CALCIUM 8.6 (L) 12/04/2019 1105    ALKPHOS 236 (H) 12/04/2019 1105    AST 39 12/04/2019 1105    ALT 43 12/04/2019 1105    BILITOT 0.8 12/04/2019 1105    ESTGFRAFRICA 31.3 (A) 12/04/2019 1105    EGFRNONAA 27.1 (A) 12/04/2019 1105          Assessment:       1. Malignant neoplasm of lower lobe of right lung    2. Brain metastases    3. Secondary cancer of bone    4. CKD (chronic kidney disease) stage 3, GFR 30-59 ml/min        Plan:        1,2,3. Reviewed PET scan, mild increase in paratracheal lymph node, continue Tagrisso for now.  Will plan on restaging PET scan in 2 months for closer follow-up. Xgeva next week   4. Stable, follows with nephrology.    Above care plan was discussed with patient and accompanying son and all questions were addressed to their satisfaction

## 2019-12-05 DIAGNOSIS — N32.81 OAB (OVERACTIVE BLADDER): ICD-10-CM

## 2019-12-06 RX ORDER — MIRABEGRON 50 MG/1
TABLET, FILM COATED, EXTENDED RELEASE ORAL
Qty: 30 TABLET | Refills: 11 | Status: SHIPPED | OUTPATIENT
Start: 2019-12-06 | End: 2020-01-01

## 2019-12-06 NOTE — TELEPHONE ENCOUNTER
----- Message from Matilde Anderson sent at 12/6/2019  7:32 AM CST -----  Contact: self/[phone]  Pt called in regards to getting a Rx refill for     MYRBETRIQ 50 mg Tb24 30 tablet 11 11/13/2018  No  TAKE ONE TABLET BY MOUTH ONCE DAILY    She would like a call once it is done.    [place] [phone]    Please advise

## 2019-12-07 DIAGNOSIS — I27.82 OTHER CHRONIC PULMONARY EMBOLISM WITHOUT ACUTE COR PULMONALE: ICD-10-CM

## 2019-12-09 RX ORDER — APIXABAN 5 MG/1
TABLET, FILM COATED ORAL
Qty: 60 TABLET | Refills: 6 | Status: SHIPPED | OUTPATIENT
Start: 2019-12-09 | End: 2020-01-01

## 2019-12-11 ENCOUNTER — INFUSION (OUTPATIENT)
Dept: INFUSION THERAPY | Facility: HOSPITAL | Age: 74
End: 2019-12-11
Attending: INTERNAL MEDICINE
Payer: MEDICARE

## 2019-12-11 DIAGNOSIS — C34.31 MALIGNANT NEOPLASM OF LOWER LOBE OF RIGHT LUNG: Primary | ICD-10-CM

## 2019-12-11 PROCEDURE — 63600175 PHARM REV CODE 636 W HCPCS: Mod: JG,HCNC | Performed by: INTERNAL MEDICINE

## 2019-12-11 PROCEDURE — 96372 THER/PROPH/DIAG INJ SC/IM: CPT | Mod: HCNC

## 2019-12-11 RX ADMIN — DENOSUMAB 120 MG: 120 INJECTION SUBCUTANEOUS at 11:12

## 2019-12-11 NOTE — NURSING
Pt tolerated  Xgeva ingection in abdomen. NAD. Declined AVS. Discharge home. Ambulated independently.

## 2019-12-17 ENCOUNTER — PES CALL (OUTPATIENT)
Dept: ADMINISTRATIVE | Facility: CLINIC | Age: 74
End: 2019-12-17

## 2020-01-01 ENCOUNTER — TELEPHONE (OUTPATIENT)
Dept: HEMATOLOGY/ONCOLOGY | Facility: CLINIC | Age: 75
End: 2020-01-01

## 2020-01-01 ENCOUNTER — CARE AT HOME (OUTPATIENT)
Dept: HOME HEALTH SERVICES | Facility: CLINIC | Age: 75
End: 2020-01-01
Payer: MEDICARE

## 2020-01-01 ENCOUNTER — PATIENT MESSAGE (OUTPATIENT)
Dept: ADMINISTRATIVE | Facility: HOSPITAL | Age: 75
End: 2020-01-01

## 2020-01-01 ENCOUNTER — TELEPHONE (OUTPATIENT)
Dept: PHARMACY | Facility: CLINIC | Age: 75
End: 2020-01-01

## 2020-01-01 ENCOUNTER — LAB VISIT (OUTPATIENT)
Dept: LAB | Facility: HOSPITAL | Age: 75
End: 2020-01-01
Attending: INTERNAL MEDICINE
Payer: MEDICARE

## 2020-01-01 ENCOUNTER — DOCUMENT SCAN (OUTPATIENT)
Dept: HOME HEALTH SERVICES | Facility: HOSPITAL | Age: 75
End: 2020-01-01
Payer: MEDICARE

## 2020-01-01 ENCOUNTER — PATIENT MESSAGE (OUTPATIENT)
Dept: OTHER | Facility: OTHER | Age: 75
End: 2020-01-01

## 2020-01-01 ENCOUNTER — SPECIALTY PHARMACY (OUTPATIENT)
Dept: PHARMACY | Facility: CLINIC | Age: 75
End: 2020-01-01

## 2020-01-01 ENCOUNTER — OFFICE VISIT (OUTPATIENT)
Dept: HEMATOLOGY/ONCOLOGY | Facility: CLINIC | Age: 75
End: 2020-01-01
Payer: MEDICARE

## 2020-01-01 ENCOUNTER — INFUSION (OUTPATIENT)
Dept: INFUSION THERAPY | Facility: HOSPITAL | Age: 75
End: 2020-01-01
Attending: INTERNAL MEDICINE
Payer: MEDICARE

## 2020-01-01 ENCOUNTER — TELEPHONE (OUTPATIENT)
Dept: GASTROENTEROLOGY | Facility: CLINIC | Age: 75
End: 2020-01-01

## 2020-01-01 ENCOUNTER — EXTERNAL HOME HEALTH (OUTPATIENT)
Dept: HOME HEALTH SERVICES | Facility: HOSPITAL | Age: 75
End: 2020-01-01
Payer: MEDICARE

## 2020-01-01 ENCOUNTER — TELEPHONE (OUTPATIENT)
Dept: HOME HEALTH SERVICES | Facility: CLINIC | Age: 75
End: 2020-01-01

## 2020-01-01 ENCOUNTER — PES CALL (OUTPATIENT)
Dept: ADMINISTRATIVE | Facility: CLINIC | Age: 75
End: 2020-01-01

## 2020-01-01 ENCOUNTER — HOSPITAL ENCOUNTER (OUTPATIENT)
Dept: RADIOLOGY | Facility: HOSPITAL | Age: 75
Discharge: HOME | End: 2020-10-12
Attending: INTERNAL MEDICINE
Payer: MEDICARE

## 2020-01-01 ENCOUNTER — TELEPHONE (OUTPATIENT)
Dept: NEUROLOGY | Facility: CLINIC | Age: 75
End: 2020-01-01

## 2020-01-01 ENCOUNTER — OFFICE VISIT (OUTPATIENT)
Dept: NEPHROLOGY | Facility: CLINIC | Age: 75
End: 2020-01-01
Payer: MEDICARE

## 2020-01-01 ENCOUNTER — HOSPITAL ENCOUNTER (OUTPATIENT)
Dept: RADIOLOGY | Facility: HOSPITAL | Age: 75
Discharge: HOME | End: 2020-10-15
Attending: INTERNAL MEDICINE
Payer: MEDICARE

## 2020-01-01 ENCOUNTER — HOSPITAL ENCOUNTER (OUTPATIENT)
Facility: HOSPITAL | Age: 75
Discharge: HOME | End: 2020-10-17
Attending: EMERGENCY MEDICINE | Admitting: INTERNAL MEDICINE
Payer: MEDICARE

## 2020-01-01 ENCOUNTER — IMMUNIZATION (OUTPATIENT)
Dept: PHARMACY | Facility: CLINIC | Age: 75
End: 2020-01-01
Payer: MEDICARE

## 2020-01-01 ENCOUNTER — HOSPITAL ENCOUNTER (INPATIENT)
Facility: HOSPITAL | Age: 75
LOS: 4 days | Discharge: HOME HEALTH | DRG: 872 | End: 2020-11-20
Attending: EMERGENCY MEDICINE | Admitting: INTERNAL MEDICINE
Payer: MEDICARE

## 2020-01-01 VITALS
HEART RATE: 81 BPM | SYSTOLIC BLOOD PRESSURE: 125 MMHG | TEMPERATURE: 98 F | DIASTOLIC BLOOD PRESSURE: 58 MMHG | RESPIRATION RATE: 18 BRPM

## 2020-01-01 VITALS
DIASTOLIC BLOOD PRESSURE: 67 MMHG | SYSTOLIC BLOOD PRESSURE: 133 MMHG | TEMPERATURE: 98 F | HEIGHT: 66 IN | WEIGHT: 129.38 LBS | BODY MASS INDEX: 20.79 KG/M2 | OXYGEN SATURATION: 98 % | RESPIRATION RATE: 16 BRPM | HEART RATE: 86 BPM

## 2020-01-01 VITALS
HEART RATE: 93 BPM | RESPIRATION RATE: 18 BRPM | HEIGHT: 66 IN | OXYGEN SATURATION: 97 % | WEIGHT: 142 LBS | SYSTOLIC BLOOD PRESSURE: 136 MMHG | TEMPERATURE: 98 F | BODY MASS INDEX: 22.82 KG/M2 | DIASTOLIC BLOOD PRESSURE: 67 MMHG

## 2020-01-01 VITALS
HEIGHT: 66 IN | OXYGEN SATURATION: 95 % | DIASTOLIC BLOOD PRESSURE: 80 MMHG | SYSTOLIC BLOOD PRESSURE: 140 MMHG | HEART RATE: 82 BPM | BODY MASS INDEX: 22.32 KG/M2 | WEIGHT: 138.88 LBS

## 2020-01-01 VITALS
TEMPERATURE: 98 F | OXYGEN SATURATION: 97 % | SYSTOLIC BLOOD PRESSURE: 130 MMHG | HEIGHT: 65 IN | DIASTOLIC BLOOD PRESSURE: 78 MMHG | BODY MASS INDEX: 22.16 KG/M2 | HEART RATE: 76 BPM | RESPIRATION RATE: 20 BRPM | WEIGHT: 133 LBS

## 2020-01-01 VITALS
SYSTOLIC BLOOD PRESSURE: 141 MMHG | RESPIRATION RATE: 16 BRPM | BODY MASS INDEX: 22.18 KG/M2 | WEIGHT: 138 LBS | HEART RATE: 87 BPM | TEMPERATURE: 98 F | OXYGEN SATURATION: 100 % | DIASTOLIC BLOOD PRESSURE: 69 MMHG | HEIGHT: 66 IN

## 2020-01-01 VITALS
RESPIRATION RATE: 18 BRPM | HEART RATE: 74 BPM | SYSTOLIC BLOOD PRESSURE: 111 MMHG | TEMPERATURE: 98 F | DIASTOLIC BLOOD PRESSURE: 55 MMHG

## 2020-01-01 VITALS
BODY MASS INDEX: 21.76 KG/M2 | SYSTOLIC BLOOD PRESSURE: 152 MMHG | DIASTOLIC BLOOD PRESSURE: 70 MMHG | OXYGEN SATURATION: 98 % | WEIGHT: 135.38 LBS | HEIGHT: 66 IN | TEMPERATURE: 98 F | RESPIRATION RATE: 16 BRPM | HEART RATE: 89 BPM

## 2020-01-01 VITALS
TEMPERATURE: 98 F | RESPIRATION RATE: 18 BRPM | HEART RATE: 84 BPM | HEIGHT: 65 IN | SYSTOLIC BLOOD PRESSURE: 130 MMHG | DIASTOLIC BLOOD PRESSURE: 80 MMHG | BODY MASS INDEX: 22.49 KG/M2 | WEIGHT: 135 LBS | OXYGEN SATURATION: 96 %

## 2020-01-01 VITALS
DIASTOLIC BLOOD PRESSURE: 59 MMHG | HEART RATE: 85 BPM | SYSTOLIC BLOOD PRESSURE: 128 MMHG | BODY MASS INDEX: 21.62 KG/M2 | OXYGEN SATURATION: 100 % | TEMPERATURE: 98 F | WEIGHT: 134.5 LBS | HEIGHT: 66 IN

## 2020-01-01 VITALS
RESPIRATION RATE: 18 BRPM | DIASTOLIC BLOOD PRESSURE: 70 MMHG | TEMPERATURE: 98 F | SYSTOLIC BLOOD PRESSURE: 120 MMHG | HEART RATE: 70 BPM

## 2020-01-01 VITALS
WEIGHT: 132.25 LBS | TEMPERATURE: 98 F | SYSTOLIC BLOOD PRESSURE: 129 MMHG | RESPIRATION RATE: 18 BRPM | OXYGEN SATURATION: 95 % | DIASTOLIC BLOOD PRESSURE: 64 MMHG | BODY MASS INDEX: 22.03 KG/M2 | HEART RATE: 81 BPM | HEIGHT: 65 IN

## 2020-01-01 VITALS
DIASTOLIC BLOOD PRESSURE: 71 MMHG | HEART RATE: 88 BPM | SYSTOLIC BLOOD PRESSURE: 135 MMHG | BODY MASS INDEX: 21.69 KG/M2 | TEMPERATURE: 98 F | WEIGHT: 135 LBS | OXYGEN SATURATION: 99 % | RESPIRATION RATE: 16 BRPM | HEIGHT: 66 IN

## 2020-01-01 DIAGNOSIS — C34.31 MALIGNANT NEOPLASM OF LOWER LOBE OF RIGHT LUNG: Primary | ICD-10-CM

## 2020-01-01 DIAGNOSIS — Z71.89 COUNSELING REGARDING ADVANCE CARE PLANNING AND GOALS OF CARE: ICD-10-CM

## 2020-01-01 DIAGNOSIS — R53.1 GENERALIZED WEAKNESS: ICD-10-CM

## 2020-01-01 DIAGNOSIS — C79.51 SECONDARY CANCER OF BONE: ICD-10-CM

## 2020-01-01 DIAGNOSIS — R53.0 NEOPLASTIC MALIGNANT RELATED FATIGUE: ICD-10-CM

## 2020-01-01 DIAGNOSIS — R74.01 TRANSAMINITIS: ICD-10-CM

## 2020-01-01 DIAGNOSIS — C34.31 MALIGNANT NEOPLASM OF LOWER LOBE OF RIGHT LUNG: ICD-10-CM

## 2020-01-01 DIAGNOSIS — C34.90 ADENOCARCINOMA OF LUNG, UNSPECIFIED LATERALITY: Primary | ICD-10-CM

## 2020-01-01 DIAGNOSIS — C25.9 MALIGNANT NEOPLASM OF PANCREAS, UNSPECIFIED LOCATION OF MALIGNANCY: ICD-10-CM

## 2020-01-01 DIAGNOSIS — N18.32 STAGE 3B CHRONIC KIDNEY DISEASE: Primary | ICD-10-CM

## 2020-01-01 DIAGNOSIS — E83.42 HYPOMAGNESEMIA: ICD-10-CM

## 2020-01-01 DIAGNOSIS — J70.1 CHRONIC AND OTHER PULMONARY MANIFESTATIONS DUE TO RADIATION: ICD-10-CM

## 2020-01-01 DIAGNOSIS — C77.1 SECONDARY MALIGNANT NEOPLASM OF INTRATHORACIC LYMPH NODES: ICD-10-CM

## 2020-01-01 DIAGNOSIS — R54 FRAILTY: ICD-10-CM

## 2020-01-01 DIAGNOSIS — C34.91 ADENOCARCINOMA OF LUNG, RIGHT: ICD-10-CM

## 2020-01-01 DIAGNOSIS — F43.21 GRIEVING: ICD-10-CM

## 2020-01-01 DIAGNOSIS — R10.84 GENERALIZED ABDOMINAL PAIN: ICD-10-CM

## 2020-01-01 DIAGNOSIS — C34.91 ADENOCARCINOMA OF LUNG, RIGHT: Primary | ICD-10-CM

## 2020-01-01 DIAGNOSIS — N18.30 STAGE 3 CHRONIC KIDNEY DISEASE, UNSPECIFIED WHETHER STAGE 3A OR 3B CKD: ICD-10-CM

## 2020-01-01 DIAGNOSIS — N18.4 CHRONIC KIDNEY DISEASE (CKD) STAGE G4/A3, SEVERELY DECREASED GLOMERULAR FILTRATION RATE (GFR) BETWEEN 15-29 ML/MIN/1.73 SQUARE METER AND ALBUMINURIA CREATININE RATIO GREATER THAN 300 MG/G: ICD-10-CM

## 2020-01-01 DIAGNOSIS — R27.0 ATAXIA: ICD-10-CM

## 2020-01-01 DIAGNOSIS — C34.2 MALIGNANT NEOPLASM OF MIDDLE LOBE, BRONCHUS OR LUNG: ICD-10-CM

## 2020-01-01 DIAGNOSIS — Z51.5 PALLIATIVE CARE ENCOUNTER: Primary | ICD-10-CM

## 2020-01-01 DIAGNOSIS — R53.1 WEAKNESS: ICD-10-CM

## 2020-01-01 DIAGNOSIS — R50.9 FEVER: ICD-10-CM

## 2020-01-01 DIAGNOSIS — C34.32 MALIGNANT NEOPLASM OF LOWER LOBE, LEFT BRONCHUS OR LUNG: ICD-10-CM

## 2020-01-01 DIAGNOSIS — N17.9 AKI (ACUTE KIDNEY INJURY): Primary | ICD-10-CM

## 2020-01-01 DIAGNOSIS — N30.00 ACUTE CYSTITIS WITHOUT HEMATURIA: ICD-10-CM

## 2020-01-01 DIAGNOSIS — R53.83 FATIGUE, UNSPECIFIED TYPE: ICD-10-CM

## 2020-01-01 DIAGNOSIS — R07.9 CHEST PAIN: ICD-10-CM

## 2020-01-01 DIAGNOSIS — E86.0 DEHYDRATION: Primary | ICD-10-CM

## 2020-01-01 DIAGNOSIS — C34.90 ADENOCARCINOMA OF LUNG, UNSPECIFIED LATERALITY: ICD-10-CM

## 2020-01-01 DIAGNOSIS — Z86.718 HISTORY OF DEEP VEIN THROMBOSIS (DVT) OF LOWER EXTREMITY: ICD-10-CM

## 2020-01-01 DIAGNOSIS — I70.0 AORTIC ATHEROSCLEROSIS: ICD-10-CM

## 2020-01-01 DIAGNOSIS — C79.31 BRAIN METASTASES: ICD-10-CM

## 2020-01-01 DIAGNOSIS — E83.39 HYPOPHOSPHATASIA: ICD-10-CM

## 2020-01-01 DIAGNOSIS — N17.9 ACUTE KIDNEY INJURY (NONTRAUMATIC): ICD-10-CM

## 2020-01-01 DIAGNOSIS — I10 ESSENTIAL HYPERTENSION: ICD-10-CM

## 2020-01-01 DIAGNOSIS — Z51.11 ENCOUNTER FOR ANTINEOPLASTIC CHEMOTHERAPY: Primary | ICD-10-CM

## 2020-01-01 DIAGNOSIS — A41.9 SEPSIS: ICD-10-CM

## 2020-01-01 DIAGNOSIS — F32.A DEPRESSION, UNSPECIFIED DEPRESSION TYPE: ICD-10-CM

## 2020-01-01 DIAGNOSIS — R64 CACHEXIA: Primary | ICD-10-CM

## 2020-01-01 DIAGNOSIS — D32.9 MENINGIOMA: Chronic | ICD-10-CM

## 2020-01-01 LAB
ABO + RH BLD: NORMAL
ACANTHOCYTES BLD QL SMEAR: PRESENT
ALBUMIN SERPL BCP-MCNC: 2.1 G/DL (ref 3.5–5.2)
ALBUMIN SERPL BCP-MCNC: 2.2 G/DL (ref 3.5–5.2)
ALBUMIN SERPL BCP-MCNC: 2.2 G/DL (ref 3.5–5.2)
ALBUMIN SERPL BCP-MCNC: 2.3 G/DL (ref 3.5–5.2)
ALBUMIN SERPL BCP-MCNC: 2.6 G/DL (ref 3.5–5.2)
ALBUMIN SERPL BCP-MCNC: 2.7 G/DL (ref 3.5–5.2)
ALBUMIN SERPL BCP-MCNC: 3.1 G/DL (ref 3.5–5.2)
ALBUMIN SERPL BCP-MCNC: 3.1 G/DL (ref 3.5–5.2)
ALBUMIN SERPL BCP-MCNC: 3.3 G/DL (ref 3.5–5.2)
ALBUMIN SERPL BCP-MCNC: 3.3 G/DL (ref 3.5–5.2)
ALBUMIN SERPL BCP-MCNC: 3.4 G/DL (ref 3.5–5.2)
ALP SERPL-CCNC: 100 U/L (ref 55–135)
ALP SERPL-CCNC: 176 U/L (ref 55–135)
ALP SERPL-CCNC: 208 U/L (ref 55–135)
ALP SERPL-CCNC: 255 U/L (ref 55–135)
ALP SERPL-CCNC: 257 U/L (ref 55–135)
ALP SERPL-CCNC: 352 U/L (ref 55–135)
ALP SERPL-CCNC: 421 U/L (ref 55–135)
ALP SERPL-CCNC: 527 U/L (ref 55–135)
ALP SERPL-CCNC: 88 U/L (ref 55–135)
ALP SERPL-CCNC: 89 U/L (ref 55–135)
ALP SERPL-CCNC: 94 U/L (ref 55–135)
ALT SERPL W/O P-5'-P-CCNC: 11 U/L (ref 10–44)
ALT SERPL W/O P-5'-P-CCNC: 130 U/L (ref 10–44)
ALT SERPL W/O P-5'-P-CCNC: 171 U/L (ref 10–44)
ALT SERPL W/O P-5'-P-CCNC: 18 U/L (ref 10–44)
ALT SERPL W/O P-5'-P-CCNC: 19 U/L (ref 10–44)
ALT SERPL W/O P-5'-P-CCNC: 197 U/L (ref 10–44)
ALT SERPL W/O P-5'-P-CCNC: 30 U/L (ref 10–44)
ALT SERPL W/O P-5'-P-CCNC: 32 U/L (ref 10–44)
ALT SERPL W/O P-5'-P-CCNC: 68 U/L (ref 10–44)
ALT SERPL W/O P-5'-P-CCNC: 79 U/L (ref 10–44)
ALT SERPL W/O P-5'-P-CCNC: 90 U/L (ref 10–44)
ANION GAP SERPL CALC-SCNC: 10 MMOL/L (ref 8–16)
ANION GAP SERPL CALC-SCNC: 11 MMOL/L (ref 8–16)
ANION GAP SERPL CALC-SCNC: 11 MMOL/L (ref 8–16)
ANION GAP SERPL CALC-SCNC: 12 MMOL/L (ref 8–16)
ANION GAP SERPL CALC-SCNC: 13 MMOL/L (ref 8–16)
ANION GAP SERPL CALC-SCNC: 13 MMOL/L (ref 8–16)
ANION GAP SERPL CALC-SCNC: 7 MMOL/L (ref 8–16)
ANION GAP SERPL CALC-SCNC: 7 MMOL/L (ref 8–16)
ANION GAP SERPL CALC-SCNC: 9 MMOL/L (ref 8–16)
ANISOCYTOSIS BLD QL SMEAR: SLIGHT
ANISOCYTOSIS BLD QL SMEAR: SLIGHT
APTT BLDCRRT: 23.4 SEC (ref 21–32)
AST SERPL-CCNC: 145 U/L (ref 10–40)
AST SERPL-CCNC: 159 U/L (ref 10–40)
AST SERPL-CCNC: 18 U/L (ref 10–40)
AST SERPL-CCNC: 20 U/L (ref 10–40)
AST SERPL-CCNC: 26 U/L (ref 10–40)
AST SERPL-CCNC: 286 U/L (ref 10–40)
AST SERPL-CCNC: 34 U/L (ref 10–40)
AST SERPL-CCNC: 384 U/L (ref 10–40)
AST SERPL-CCNC: 60 U/L (ref 10–40)
BACTERIA #/AREA URNS AUTO: ABNORMAL /HPF
BACTERIA BLD CULT: ABNORMAL
BACTERIA BLD CULT: NORMAL
BACTERIA BLD CULT: NORMAL
BACTERIA UR CULT: NORMAL
BACTERIA UR CULT: NORMAL
BASOPHILS # BLD AUTO: 0.01 K/UL (ref 0–0.2)
BASOPHILS # BLD AUTO: 0.01 K/UL (ref 0–0.2)
BASOPHILS # BLD AUTO: 0.02 K/UL (ref 0–0.2)
BASOPHILS # BLD AUTO: 0.02 K/UL (ref 0–0.2)
BASOPHILS # BLD AUTO: 0.03 K/UL (ref 0–0.2)
BASOPHILS NFR BLD: 0.1 % (ref 0–1.9)
BASOPHILS NFR BLD: 0.2 % (ref 0–1.9)
BASOPHILS NFR BLD: 0.4 % (ref 0–1.9)
BASOPHILS NFR BLD: 0.6 % (ref 0–1.9)
BILIRUB SERPL-MCNC: 0.3 MG/DL (ref 0.1–1)
BILIRUB SERPL-MCNC: 0.4 MG/DL (ref 0.1–1)
BILIRUB SERPL-MCNC: 0.5 MG/DL (ref 0.1–1)
BILIRUB SERPL-MCNC: 0.5 MG/DL (ref 0.1–1)
BILIRUB SERPL-MCNC: 0.6 MG/DL (ref 0.1–1)
BILIRUB SERPL-MCNC: 0.7 MG/DL (ref 0.1–1)
BILIRUB SERPL-MCNC: 0.9 MG/DL (ref 0.1–1)
BILIRUB SERPL-MCNC: 0.9 MG/DL (ref 0.1–1)
BILIRUB SERPL-MCNC: 2.1 MG/DL (ref 0.1–1)
BILIRUB SERPL-MCNC: 2.2 MG/DL (ref 0.1–1)
BILIRUB SERPL-MCNC: 2.3 MG/DL (ref 0.1–1)
BILIRUB UR QL STRIP: NEGATIVE
BILIRUB UR QL STRIP: NEGATIVE
BLD GP AB SCN CELLS X3 SERPL QL: NORMAL
BLD PROD TYP BPU: NORMAL
BLOOD UNIT EXPIRATION DATE: NORMAL
BLOOD UNIT TYPE CODE: 8400
BLOOD UNIT TYPE: NORMAL
BNP SERPL-MCNC: 196 PG/ML (ref 0–99)
BUN SERPL-MCNC: 36 MG/DL (ref 8–23)
BUN SERPL-MCNC: 38 MG/DL (ref 8–23)
BUN SERPL-MCNC: 38 MG/DL (ref 8–23)
BUN SERPL-MCNC: 39 MG/DL (ref 8–23)
BUN SERPL-MCNC: 40 MG/DL (ref 8–23)
BUN SERPL-MCNC: 55 MG/DL (ref 8–23)
BUN SERPL-MCNC: 56 MG/DL (ref 8–23)
BUN SERPL-MCNC: 57 MG/DL (ref 8–23)
BUN SERPL-MCNC: 58 MG/DL (ref 8–23)
BUN SERPL-MCNC: 66 MG/DL (ref 8–23)
BUN SERPL-MCNC: 67 MG/DL (ref 8–23)
BUN SERPL-MCNC: 67 MG/DL (ref 8–23)
BUN SERPL-MCNC: 74 MG/DL (ref 8–23)
BUN SERPL-MCNC: 80 MG/DL (ref 8–23)
BURR CELLS BLD QL SMEAR: ABNORMAL
CA-I BLDV-SCNC: 1 MMOL/L (ref 1.06–1.42)
CALCIUM SERPL-MCNC: 6.3 MG/DL (ref 8.7–10.5)
CALCIUM SERPL-MCNC: 6.7 MG/DL (ref 8.7–10.5)
CALCIUM SERPL-MCNC: 6.7 MG/DL (ref 8.7–10.5)
CALCIUM SERPL-MCNC: 7.2 MG/DL (ref 8.7–10.5)
CALCIUM SERPL-MCNC: 7.2 MG/DL (ref 8.7–10.5)
CALCIUM SERPL-MCNC: 7.3 MG/DL (ref 8.7–10.5)
CALCIUM SERPL-MCNC: 7.3 MG/DL (ref 8.7–10.5)
CALCIUM SERPL-MCNC: 7.9 MG/DL (ref 8.7–10.5)
CALCIUM SERPL-MCNC: 7.9 MG/DL (ref 8.7–10.5)
CALCIUM SERPL-MCNC: 8.1 MG/DL (ref 8.7–10.5)
CALCIUM SERPL-MCNC: 8.3 MG/DL (ref 8.7–10.5)
CALCIUM SERPL-MCNC: 8.3 MG/DL (ref 8.7–10.5)
CALCIUM SERPL-MCNC: 8.8 MG/DL (ref 8.7–10.5)
CALCIUM SERPL-MCNC: 8.8 MG/DL (ref 8.7–10.5)
CHLORIDE SERPL-SCNC: 106 MMOL/L (ref 95–110)
CHLORIDE SERPL-SCNC: 111 MMOL/L (ref 95–110)
CHLORIDE SERPL-SCNC: 112 MMOL/L (ref 95–110)
CHLORIDE SERPL-SCNC: 112 MMOL/L (ref 95–110)
CHLORIDE SERPL-SCNC: 114 MMOL/L (ref 95–110)
CHLORIDE SERPL-SCNC: 114 MMOL/L (ref 95–110)
CHLORIDE SERPL-SCNC: 115 MMOL/L (ref 95–110)
CHLORIDE SERPL-SCNC: 117 MMOL/L (ref 95–110)
CHLORIDE SERPL-SCNC: 117 MMOL/L (ref 95–110)
CHLORIDE SERPL-SCNC: 118 MMOL/L (ref 95–110)
CHLORIDE SERPL-SCNC: 118 MMOL/L (ref 95–110)
CHLORIDE SERPL-SCNC: 119 MMOL/L (ref 95–110)
CLARITY UR REFRACT.AUTO: CLEAR
CLARITY UR REFRACT.AUTO: CLEAR
CO2 SERPL-SCNC: 10 MMOL/L (ref 23–29)
CO2 SERPL-SCNC: 12 MMOL/L (ref 23–29)
CO2 SERPL-SCNC: 12 MMOL/L (ref 23–29)
CO2 SERPL-SCNC: 13 MMOL/L (ref 23–29)
CO2 SERPL-SCNC: 14 MMOL/L (ref 23–29)
CO2 SERPL-SCNC: 15 MMOL/L (ref 23–29)
CO2 SERPL-SCNC: 16 MMOL/L (ref 23–29)
CO2 SERPL-SCNC: 16 MMOL/L (ref 23–29)
CO2 SERPL-SCNC: 17 MMOL/L (ref 23–29)
CO2 SERPL-SCNC: 22 MMOL/L (ref 23–29)
CODING SYSTEM: NORMAL
COLOR UR AUTO: NORMAL
COLOR UR AUTO: YELLOW
CREAT SERPL-MCNC: 3.4 MG/DL (ref 0.5–1.4)
CREAT SERPL-MCNC: 3.4 MG/DL (ref 0.5–1.4)
CREAT SERPL-MCNC: 3.6 MG/DL (ref 0.5–1.4)
CREAT SERPL-MCNC: 3.7 MG/DL (ref 0.5–1.4)
CREAT SERPL-MCNC: 3.8 MG/DL (ref 0.5–1.4)
CREAT SERPL-MCNC: 3.9 MG/DL (ref 0.5–1.4)
CREAT SERPL-MCNC: 4 MG/DL (ref 0.5–1.4)
CREAT SERPL-MCNC: 4.2 MG/DL (ref 0.5–1.4)
CREAT SERPL-MCNC: 4.4 MG/DL (ref 0.5–1.4)
CREAT SERPL-MCNC: 4.7 MG/DL (ref 0.5–1.4)
CREAT UR-MCNC: 36 MG/DL (ref 15–325)
CTP QC/QA: YES
DIFFERENTIAL METHOD: ABNORMAL
DISPENSE STATUS: NORMAL
EOSINOPHIL # BLD AUTO: 0 K/UL (ref 0–0.5)
EOSINOPHIL # BLD AUTO: 0 K/UL (ref 0–0.5)
EOSINOPHIL # BLD AUTO: 0.1 K/UL (ref 0–0.5)
EOSINOPHIL # BLD AUTO: 0.1 K/UL (ref 0–0.5)
EOSINOPHIL # BLD AUTO: 0.2 K/UL (ref 0–0.5)
EOSINOPHIL # BLD AUTO: 0.3 K/UL (ref 0–0.5)
EOSINOPHIL NFR BLD: 0 % (ref 0–8)
EOSINOPHIL NFR BLD: 0.1 % (ref 0–8)
EOSINOPHIL NFR BLD: 0.3 % (ref 0–8)
EOSINOPHIL NFR BLD: 1.1 % (ref 0–8)
EOSINOPHIL NFR BLD: 3.7 % (ref 0–8)
EOSINOPHIL NFR BLD: 3.8 % (ref 0–8)
EOSINOPHIL NFR BLD: 4.7 % (ref 0–8)
EOSINOPHIL NFR BLD: 5.5 % (ref 0–8)
ERYTHROCYTE [DISTWIDTH] IN BLOOD BY AUTOMATED COUNT: 13.7 % (ref 11.5–14.5)
ERYTHROCYTE [DISTWIDTH] IN BLOOD BY AUTOMATED COUNT: 14.1 % (ref 11.5–14.5)
ERYTHROCYTE [DISTWIDTH] IN BLOOD BY AUTOMATED COUNT: 16 % (ref 11.5–14.5)
ERYTHROCYTE [DISTWIDTH] IN BLOOD BY AUTOMATED COUNT: 16.1 % (ref 11.5–14.5)
ERYTHROCYTE [DISTWIDTH] IN BLOOD BY AUTOMATED COUNT: 16.2 % (ref 11.5–14.5)
ERYTHROCYTE [DISTWIDTH] IN BLOOD BY AUTOMATED COUNT: 16.4 % (ref 11.5–14.5)
ERYTHROCYTE [DISTWIDTH] IN BLOOD BY AUTOMATED COUNT: 16.6 % (ref 11.5–14.5)
ERYTHROCYTE [DISTWIDTH] IN BLOOD BY AUTOMATED COUNT: 16.9 % (ref 11.5–14.5)
ERYTHROCYTE [DISTWIDTH] IN BLOOD BY AUTOMATED COUNT: 17 % (ref 11.5–14.5)
ERYTHROCYTE [DISTWIDTH] IN BLOOD BY AUTOMATED COUNT: 17.6 % (ref 11.5–14.5)
ERYTHROCYTE [DISTWIDTH] IN BLOOD BY AUTOMATED COUNT: 17.7 % (ref 11.5–14.5)
EST. GFR  (AFRICAN AMERICAN): 10.5 ML/MIN/1.73 M^2
EST. GFR  (AFRICAN AMERICAN): 11.2 ML/MIN/1.73 M^2
EST. GFR  (AFRICAN AMERICAN): 11.8 ML/MIN/1.73 M^2
EST. GFR  (AFRICAN AMERICAN): 12.2 ML/MIN/1.73 M^2
EST. GFR  (AFRICAN AMERICAN): 12.6 ML/MIN/1.73 M^2
EST. GFR  (AFRICAN AMERICAN): 13 ML/MIN/1.73 M^2
EST. GFR  (AFRICAN AMERICAN): 13.4 ML/MIN/1.73 M^2
EST. GFR  (AFRICAN AMERICAN): 14.4 ML/MIN/1.73 M^2
EST. GFR  (AFRICAN AMERICAN): 14.4 ML/MIN/1.73 M^2
EST. GFR  (AFRICAN AMERICAN): 9.7 ML/MIN/1.73 M^2
EST. GFR  (NON AFRICAN AMERICAN): 10.3 ML/MIN/1.73 M^2
EST. GFR  (NON AFRICAN AMERICAN): 10.6 ML/MIN/1.73 M^2
EST. GFR  (NON AFRICAN AMERICAN): 10.9 ML/MIN/1.73 M^2
EST. GFR  (NON AFRICAN AMERICAN): 11.3 ML/MIN/1.73 M^2
EST. GFR  (NON AFRICAN AMERICAN): 11.7 ML/MIN/1.73 M^2
EST. GFR  (NON AFRICAN AMERICAN): 12.5 ML/MIN/1.73 M^2
EST. GFR  (NON AFRICAN AMERICAN): 12.5 ML/MIN/1.73 M^2
EST. GFR  (NON AFRICAN AMERICAN): 8.4 ML/MIN/1.73 M^2
EST. GFR  (NON AFRICAN AMERICAN): 9.1 ML/MIN/1.73 M^2
EST. GFR  (NON AFRICAN AMERICAN): 9.7 ML/MIN/1.73 M^2
FERRITIN SERPL-MCNC: 53 NG/ML (ref 20–300)
FOLATE SERPL-MCNC: 11.1 NG/ML (ref 4–24)
GLUCOSE SERPL-MCNC: 100 MG/DL (ref 70–110)
GLUCOSE SERPL-MCNC: 100 MG/DL (ref 70–110)
GLUCOSE SERPL-MCNC: 101 MG/DL (ref 70–110)
GLUCOSE SERPL-MCNC: 105 MG/DL (ref 70–110)
GLUCOSE SERPL-MCNC: 131 MG/DL (ref 70–110)
GLUCOSE SERPL-MCNC: 131 MG/DL (ref 70–110)
GLUCOSE SERPL-MCNC: 62 MG/DL (ref 70–110)
GLUCOSE SERPL-MCNC: 74 MG/DL (ref 70–110)
GLUCOSE SERPL-MCNC: 83 MG/DL (ref 70–110)
GLUCOSE SERPL-MCNC: 85 MG/DL (ref 70–110)
GLUCOSE SERPL-MCNC: 90 MG/DL (ref 70–110)
GLUCOSE SERPL-MCNC: 91 MG/DL (ref 70–110)
GLUCOSE SERPL-MCNC: 92 MG/DL (ref 70–110)
GLUCOSE SERPL-MCNC: 94 MG/DL (ref 70–110)
GLUCOSE SERPL-MCNC: 99 MG/DL (ref 70–110)
GLUCOSE UR QL STRIP: NEGATIVE
GLUCOSE UR QL STRIP: NEGATIVE
HAPTOGLOB SERPL-MCNC: 127 MG/DL (ref 30–250)
HCT VFR BLD AUTO: 22.7 % (ref 37–48.5)
HCT VFR BLD AUTO: 25.4 % (ref 37–48.5)
HCT VFR BLD AUTO: 26.1 % (ref 37–48.5)
HCT VFR BLD AUTO: 26.3 % (ref 37–48.5)
HCT VFR BLD AUTO: 26.4 % (ref 37–48.5)
HCT VFR BLD AUTO: 26.4 % (ref 37–48.5)
HCT VFR BLD AUTO: 29.9 % (ref 37–48.5)
HCT VFR BLD AUTO: 30.1 % (ref 37–48.5)
HCT VFR BLD AUTO: 31.1 % (ref 37–48.5)
HCT VFR BLD AUTO: 31.2 % (ref 37–48.5)
HCT VFR BLD AUTO: 31.5 % (ref 37–48.5)
HGB BLD-MCNC: 6.8 G/DL (ref 12–16)
HGB BLD-MCNC: 7.7 G/DL (ref 12–16)
HGB BLD-MCNC: 7.8 G/DL (ref 12–16)
HGB BLD-MCNC: 7.9 G/DL (ref 12–16)
HGB BLD-MCNC: 8.1 G/DL (ref 12–16)
HGB BLD-MCNC: 8.2 G/DL (ref 12–16)
HGB BLD-MCNC: 9 G/DL (ref 12–16)
HGB BLD-MCNC: 9.1 G/DL (ref 12–16)
HGB BLD-MCNC: 9.1 G/DL (ref 12–16)
HGB BLD-MCNC: 9.3 G/DL (ref 12–16)
HGB BLD-MCNC: 9.3 G/DL (ref 12–16)
HGB UR QL STRIP: NEGATIVE
HGB UR QL STRIP: NEGATIVE
IMM GRANULOCYTES # BLD AUTO: 0.02 K/UL (ref 0–0.04)
IMM GRANULOCYTES # BLD AUTO: 0.03 K/UL (ref 0–0.04)
IMM GRANULOCYTES # BLD AUTO: 0.05 K/UL (ref 0–0.04)
IMM GRANULOCYTES # BLD AUTO: 0.06 K/UL (ref 0–0.04)
IMM GRANULOCYTES # BLD AUTO: 0.08 K/UL (ref 0–0.04)
IMM GRANULOCYTES # BLD AUTO: 0.08 K/UL (ref 0–0.04)
IMM GRANULOCYTES # BLD AUTO: 0.1 K/UL (ref 0–0.04)
IMM GRANULOCYTES # BLD AUTO: 0.29 K/UL (ref 0–0.04)
IMM GRANULOCYTES NFR BLD AUTO: 0.4 % (ref 0–0.5)
IMM GRANULOCYTES NFR BLD AUTO: 0.4 % (ref 0–0.5)
IMM GRANULOCYTES NFR BLD AUTO: 0.5 % (ref 0–0.5)
IMM GRANULOCYTES NFR BLD AUTO: 0.5 % (ref 0–0.5)
IMM GRANULOCYTES NFR BLD AUTO: 0.7 % (ref 0–0.5)
IMM GRANULOCYTES NFR BLD AUTO: 0.9 % (ref 0–0.5)
IMM GRANULOCYTES NFR BLD AUTO: 1 % (ref 0–0.5)
IMM GRANULOCYTES NFR BLD AUTO: 1.7 % (ref 0–0.5)
INR PPP: 1.2 (ref 0.8–1.2)
IRON SERPL-MCNC: 68 UG/DL (ref 30–160)
KETONES UR QL STRIP: NEGATIVE
KETONES UR QL STRIP: NEGATIVE
LACTATE SERPL-SCNC: 1 MMOL/L (ref 0.5–2.2)
LEUKOCYTE ESTERASE UR QL STRIP: ABNORMAL
LEUKOCYTE ESTERASE UR QL STRIP: NEGATIVE
LIPASE SERPL-CCNC: <3 U/L (ref 4–60)
LYMPHOCYTES # BLD AUTO: 0.2 K/UL (ref 1–4.8)
LYMPHOCYTES # BLD AUTO: 0.8 K/UL (ref 1–4.8)
LYMPHOCYTES # BLD AUTO: 0.8 K/UL (ref 1–4.8)
LYMPHOCYTES # BLD AUTO: 0.9 K/UL (ref 1–4.8)
LYMPHOCYTES # BLD AUTO: 1 K/UL (ref 1–4.8)
LYMPHOCYTES # BLD AUTO: 1.1 K/UL (ref 1–4.8)
LYMPHOCYTES # BLD AUTO: 1.3 K/UL (ref 1–4.8)
LYMPHOCYTES # BLD AUTO: 1.3 K/UL (ref 1–4.8)
LYMPHOCYTES NFR BLD: 13.1 % (ref 18–48)
LYMPHOCYTES NFR BLD: 15.5 % (ref 18–48)
LYMPHOCYTES NFR BLD: 2.3 % (ref 18–48)
LYMPHOCYTES NFR BLD: 25.4 % (ref 18–48)
LYMPHOCYTES NFR BLD: 28.1 % (ref 18–48)
LYMPHOCYTES NFR BLD: 4.6 % (ref 18–48)
LYMPHOCYTES NFR BLD: 4.9 % (ref 18–48)
LYMPHOCYTES NFR BLD: 7.6 % (ref 18–48)
MAGNESIUM SERPL-MCNC: 1.4 MG/DL (ref 1.6–2.6)
MAGNESIUM SERPL-MCNC: 1.5 MG/DL (ref 1.6–2.6)
MAGNESIUM SERPL-MCNC: 1.7 MG/DL (ref 1.6–2.6)
MAGNESIUM SERPL-MCNC: 1.8 MG/DL (ref 1.6–2.6)
MAGNESIUM SERPL-MCNC: 1.9 MG/DL (ref 1.6–2.6)
MAGNESIUM SERPL-MCNC: 2 MG/DL (ref 1.6–2.6)
MCH RBC QN AUTO: 27.7 PG (ref 27–31)
MCH RBC QN AUTO: 28.2 PG (ref 27–31)
MCH RBC QN AUTO: 28.3 PG (ref 27–31)
MCH RBC QN AUTO: 28.5 PG (ref 27–31)
MCH RBC QN AUTO: 28.8 PG (ref 27–31)
MCH RBC QN AUTO: 28.9 PG (ref 27–31)
MCH RBC QN AUTO: 29.2 PG (ref 27–31)
MCH RBC QN AUTO: 29.5 PG (ref 27–31)
MCH RBC QN AUTO: 29.7 PG (ref 27–31)
MCHC RBC AUTO-ENTMCNC: 29.2 G/DL (ref 32–36)
MCHC RBC AUTO-ENTMCNC: 29.5 G/DL (ref 32–36)
MCHC RBC AUTO-ENTMCNC: 29.5 G/DL (ref 32–36)
MCHC RBC AUTO-ENTMCNC: 29.9 G/DL (ref 32–36)
MCHC RBC AUTO-ENTMCNC: 29.9 G/DL (ref 32–36)
MCHC RBC AUTO-ENTMCNC: 30 G/DL (ref 32–36)
MCHC RBC AUTO-ENTMCNC: 30 G/DL (ref 32–36)
MCHC RBC AUTO-ENTMCNC: 30.4 G/DL (ref 32–36)
MCHC RBC AUTO-ENTMCNC: 30.7 G/DL (ref 32–36)
MCHC RBC AUTO-ENTMCNC: 30.7 G/DL (ref 32–36)
MCHC RBC AUTO-ENTMCNC: 31.1 G/DL (ref 32–36)
MCV RBC AUTO: 94 FL (ref 82–98)
MCV RBC AUTO: 95 FL (ref 82–98)
MCV RBC AUTO: 97 FL (ref 82–98)
MCV RBC AUTO: 98 FL (ref 82–98)
MICROSCOPIC COMMENT: ABNORMAL
MONOCYTES # BLD AUTO: 0.6 K/UL (ref 0.3–1)
MONOCYTES # BLD AUTO: 0.7 K/UL (ref 0.3–1)
MONOCYTES # BLD AUTO: 0.8 K/UL (ref 0.3–1)
MONOCYTES # BLD AUTO: 0.8 K/UL (ref 0.3–1)
MONOCYTES # BLD AUTO: 1 K/UL (ref 0.3–1)
MONOCYTES # BLD AUTO: 1.1 K/UL (ref 0.3–1)
MONOCYTES NFR BLD: 10.3 % (ref 4–15)
MONOCYTES NFR BLD: 10.7 % (ref 4–15)
MONOCYTES NFR BLD: 13.2 % (ref 4–15)
MONOCYTES NFR BLD: 15.8 % (ref 4–15)
MONOCYTES NFR BLD: 5.7 % (ref 4–15)
MONOCYTES NFR BLD: 5.7 % (ref 4–15)
MONOCYTES NFR BLD: 5.8 % (ref 4–15)
MONOCYTES NFR BLD: 7 % (ref 4–15)
NEUTROPHILS # BLD AUTO: 14.8 K/UL (ref 1.8–7.7)
NEUTROPHILS # BLD AUTO: 16.5 K/UL (ref 1.8–7.7)
NEUTROPHILS # BLD AUTO: 2.3 K/UL (ref 1.8–7.7)
NEUTROPHILS # BLD AUTO: 2.8 K/UL (ref 1.8–7.7)
NEUTROPHILS # BLD AUTO: 3.3 K/UL (ref 1.8–7.7)
NEUTROPHILS # BLD AUTO: 3.7 K/UL (ref 1.8–7.7)
NEUTROPHILS # BLD AUTO: 3.8 K/UL (ref 1.8–7.7)
NEUTROPHILS # BLD AUTO: 4.7 K/UL (ref 1.8–7.7)
NEUTROPHILS # BLD AUTO: 5.5 K/UL (ref 1.8–7.7)
NEUTROPHILS # BLD AUTO: 9.2 K/UL (ref 1.8–7.7)
NEUTROPHILS # BLD AUTO: 9.8 K/UL (ref 1.8–7.7)
NEUTROPHILS NFR BLD: 49.8 % (ref 38–73)
NEUTROPHILS NFR BLD: 55.7 % (ref 38–73)
NEUTROPHILS NFR BLD: 69.1 % (ref 38–73)
NEUTROPHILS NFR BLD: 71.1 % (ref 38–73)
NEUTROPHILS NFR BLD: 83.5 % (ref 38–73)
NEUTROPHILS NFR BLD: 87.6 % (ref 38–73)
NEUTROPHILS NFR BLD: 88.5 % (ref 38–73)
NEUTROPHILS NFR BLD: 91.4 % (ref 38–73)
NITRITE UR QL STRIP: NEGATIVE
NITRITE UR QL STRIP: NEGATIVE
NRBC BLD-RTO: 0 /100 WBC
OSMOLALITY UR: 336 MOSM/KG (ref 50–1200)
OVALOCYTES BLD QL SMEAR: ABNORMAL
OVALOCYTES BLD QL SMEAR: ABNORMAL
PH UR STRIP: 5 [PH] (ref 5–8)
PH UR STRIP: 5 [PH] (ref 5–8)
PHOSPHATE SERPL-MCNC: 2.5 MG/DL (ref 2.7–4.5)
PHOSPHATE SERPL-MCNC: 3.5 MG/DL (ref 2.7–4.5)
PHOSPHATE SERPL-MCNC: 4.2 MG/DL (ref 2.7–4.5)
PHOSPHATE SERPL-MCNC: 4.3 MG/DL (ref 2.7–4.5)
PHOSPHATE SERPL-MCNC: 4.6 MG/DL (ref 2.7–4.5)
PLATELET # BLD AUTO: 161 K/UL (ref 150–350)
PLATELET # BLD AUTO: 204 K/UL (ref 150–350)
PLATELET # BLD AUTO: 211 K/UL (ref 150–350)
PLATELET # BLD AUTO: 213 K/UL (ref 150–350)
PLATELET # BLD AUTO: 217 K/UL (ref 150–350)
PLATELET # BLD AUTO: 233 K/UL (ref 150–350)
PLATELET # BLD AUTO: 237 K/UL (ref 150–350)
PLATELET # BLD AUTO: 238 K/UL (ref 150–350)
PLATELET # BLD AUTO: 270 K/UL (ref 150–350)
PLATELET # BLD AUTO: 284 K/UL (ref 150–350)
PLATELET # BLD AUTO: ABNORMAL K/UL (ref 150–350)
PLATELET BLD QL SMEAR: ABNORMAL
PLATELET BLD QL SMEAR: ABNORMAL
PMV BLD AUTO: 10 FL (ref 9.2–12.9)
PMV BLD AUTO: 10.1 FL (ref 9.2–12.9)
PMV BLD AUTO: 10.3 FL (ref 9.2–12.9)
PMV BLD AUTO: 10.5 FL (ref 9.2–12.9)
PMV BLD AUTO: 10.6 FL (ref 9.2–12.9)
PMV BLD AUTO: 10.9 FL (ref 9.2–12.9)
PMV BLD AUTO: 9.5 FL (ref 9.2–12.9)
PMV BLD AUTO: 9.9 FL (ref 9.2–12.9)
PMV BLD AUTO: ABNORMAL FL (ref 9.2–12.9)
POC MOLECULAR INFLUENZA A AGN: NEGATIVE
POC MOLECULAR INFLUENZA B AGN: NEGATIVE
POCT GLUCOSE: 100 MG/DL (ref 70–110)
POCT GLUCOSE: 99 MG/DL (ref 70–110)
POIKILOCYTOSIS BLD QL SMEAR: ABNORMAL
POIKILOCYTOSIS BLD QL SMEAR: SLIGHT
POTASSIUM SERPL-SCNC: 3.9 MMOL/L (ref 3.5–5.1)
POTASSIUM SERPL-SCNC: 3.9 MMOL/L (ref 3.5–5.1)
POTASSIUM SERPL-SCNC: 4.1 MMOL/L (ref 3.5–5.1)
POTASSIUM SERPL-SCNC: 4.1 MMOL/L (ref 3.5–5.1)
POTASSIUM SERPL-SCNC: 4.2 MMOL/L (ref 3.5–5.1)
POTASSIUM SERPL-SCNC: 4.3 MMOL/L (ref 3.5–5.1)
POTASSIUM SERPL-SCNC: 4.5 MMOL/L (ref 3.5–5.1)
POTASSIUM SERPL-SCNC: 4.5 MMOL/L (ref 3.5–5.1)
POTASSIUM SERPL-SCNC: 4.6 MMOL/L (ref 3.5–5.1)
POTASSIUM SERPL-SCNC: 4.7 MMOL/L (ref 3.5–5.1)
POTASSIUM SERPL-SCNC: 4.8 MMOL/L (ref 3.5–5.1)
POTASSIUM SERPL-SCNC: 5 MMOL/L (ref 3.5–5.1)
POTASSIUM UR-SCNC: 15 MMOL/L (ref 15–95)
PROT SERPL-MCNC: 4.9 G/DL (ref 6–8.4)
PROT SERPL-MCNC: 5.1 G/DL (ref 6–8.4)
PROT SERPL-MCNC: 5.4 G/DL (ref 6–8.4)
PROT SERPL-MCNC: 5.7 G/DL (ref 6–8.4)
PROT SERPL-MCNC: 5.7 G/DL (ref 6–8.4)
PROT SERPL-MCNC: 5.9 G/DL (ref 6–8.4)
PROT SERPL-MCNC: 6.1 G/DL (ref 6–8.4)
PROT SERPL-MCNC: 6.7 G/DL (ref 6–8.4)
PROT SERPL-MCNC: 7 G/DL (ref 6–8.4)
PROT SERPL-MCNC: 7 G/DL (ref 6–8.4)
PROT SERPL-MCNC: 7.1 G/DL (ref 6–8.4)
PROT UR QL STRIP: NEGATIVE
PROT UR QL STRIP: NEGATIVE
PROTHROMBIN TIME: 12.9 SEC (ref 9–12.5)
RBC # BLD AUTO: 2.39 M/UL (ref 4–5.4)
RBC # BLD AUTO: 2.63 M/UL (ref 4–5.4)
RBC # BLD AUTO: 2.66 M/UL (ref 4–5.4)
RBC # BLD AUTO: 2.78 M/UL (ref 4–5.4)
RBC # BLD AUTO: 2.79 M/UL (ref 4–5.4)
RBC # BLD AUTO: 2.8 M/UL (ref 4–5.4)
RBC # BLD AUTO: 3.15 M/UL (ref 4–5.4)
RBC # BLD AUTO: 3.19 M/UL (ref 4–5.4)
RBC # BLD AUTO: 3.19 M/UL (ref 4–5.4)
RBC # BLD AUTO: 3.23 M/UL (ref 4–5.4)
RBC # BLD AUTO: 3.29 M/UL (ref 4–5.4)
RBC #/AREA URNS AUTO: 1 /HPF (ref 0–4)
SARS-COV-2 RDRP RESP QL NAA+PROBE: NEGATIVE
SARS-COV-2 RDRP RESP QL NAA+PROBE: NEGATIVE
SATURATED IRON: 23 % (ref 20–50)
SCHISTOCYTES BLD QL SMEAR: ABNORMAL
SCHISTOCYTES BLD QL SMEAR: PRESENT
SODIUM SERPL-SCNC: 137 MMOL/L (ref 136–145)
SODIUM SERPL-SCNC: 138 MMOL/L (ref 136–145)
SODIUM SERPL-SCNC: 139 MMOL/L (ref 136–145)
SODIUM SERPL-SCNC: 139 MMOL/L (ref 136–145)
SODIUM SERPL-SCNC: 140 MMOL/L (ref 136–145)
SODIUM SERPL-SCNC: 141 MMOL/L (ref 136–145)
SODIUM SERPL-SCNC: 142 MMOL/L (ref 136–145)
SODIUM SERPL-SCNC: 143 MMOL/L (ref 136–145)
SODIUM SERPL-SCNC: 143 MMOL/L (ref 136–145)
SODIUM UR-SCNC: 86 MMOL/L (ref 20–250)
SP GR UR STRIP: 1.01 (ref 1–1.03)
SP GR UR STRIP: 1.01 (ref 1–1.03)
SQUAMOUS #/AREA URNS AUTO: 4 /HPF
TOTAL IRON BINDING CAPACITY: 295 UG/DL (ref 250–450)
TRANS ERYTHROCYTES VOL PATIENT: NORMAL ML
TRANSFERRIN SERPL-MCNC: 199 MG/DL (ref 200–375)
URN SPEC COLLECT METH UR: ABNORMAL
URN SPEC COLLECT METH UR: NORMAL
UUN UR-MCNC: 320 MG/DL (ref 140–1050)
VIT B12 SERPL-MCNC: 1866 PG/ML (ref 210–950)
WBC # BLD AUTO: 10.66 K/UL (ref 3.9–12.7)
WBC # BLD AUTO: 10.96 K/UL (ref 3.9–12.7)
WBC # BLD AUTO: 16.86 K/UL (ref 3.9–12.7)
WBC # BLD AUTO: 18.69 K/UL (ref 3.9–12.7)
WBC # BLD AUTO: 4.56 K/UL (ref 3.9–12.7)
WBC # BLD AUTO: 4.72 K/UL (ref 3.9–12.7)
WBC # BLD AUTO: 4.93 K/UL (ref 3.9–12.7)
WBC # BLD AUTO: 5.61 K/UL (ref 3.9–12.7)
WBC # BLD AUTO: 5.74 K/UL (ref 3.9–12.7)
WBC # BLD AUTO: 6.82 K/UL (ref 3.9–12.7)
WBC # BLD AUTO: 7.77 K/UL (ref 3.9–12.7)
WBC #/AREA URNS AUTO: 15 /HPF (ref 0–5)

## 2020-01-01 PROCEDURE — 36415 COLL VENOUS BLD VENIPUNCTURE: CPT

## 2020-01-01 PROCEDURE — 90832 PR PSYCHOTHERAPY W/PATIENT, 30 MIN: ICD-10-PCS | Mod: ,,, | Performed by: PSYCHOLOGIST

## 2020-01-01 PROCEDURE — 99499 RISK ADDL DX/OHS AUDIT: ICD-10-PCS | Mod: S$GLB,,, | Performed by: INTERNAL MEDICINE

## 2020-01-01 PROCEDURE — 63600175 PHARM REV CODE 636 W HCPCS: Performed by: STUDENT IN AN ORGANIZED HEALTH CARE EDUCATION/TRAINING PROGRAM

## 2020-01-01 PROCEDURE — 87077 CULTURE AEROBIC IDENTIFY: CPT

## 2020-01-01 PROCEDURE — 85025 COMPLETE CBC W/AUTO DIFF WBC: CPT

## 2020-01-01 PROCEDURE — 82570 ASSAY OF URINE CREATININE: CPT

## 2020-01-01 PROCEDURE — 99497 PR ADVNCD CARE PLAN 30 MIN: ICD-10-PCS | Mod: S$GLB,,, | Performed by: NURSE PRACTITIONER

## 2020-01-01 PROCEDURE — 99215 OFFICE O/P EST HI 40 MIN: CPT | Mod: ,,, | Performed by: INTERNAL MEDICINE

## 2020-01-01 PROCEDURE — 1159F MED LIST DOCD IN RCRD: CPT | Mod: S$GLB,,, | Performed by: INTERNAL MEDICINE

## 2020-01-01 PROCEDURE — 1126F AMNT PAIN NOTED NONE PRSNT: CPT | Mod: S$GLB,,, | Performed by: INTERNAL MEDICINE

## 2020-01-01 PROCEDURE — 3074F PR MOST RECENT SYSTOLIC BLOOD PRESSURE < 130 MM HG: ICD-10-PCS | Mod: CPTII,S$GLB,, | Performed by: PHYSICIAN ASSISTANT

## 2020-01-01 PROCEDURE — 25000003 PHARM REV CODE 250: Performed by: STUDENT IN AN ORGANIZED HEALTH CARE EDUCATION/TRAINING PROGRAM

## 2020-01-01 PROCEDURE — 84100 ASSAY OF PHOSPHORUS: CPT

## 2020-01-01 PROCEDURE — 85027 COMPLETE CBC AUTOMATED: CPT

## 2020-01-01 PROCEDURE — U0002 COVID-19 LAB TEST NON-CDC: HCPCS | Performed by: EMERGENCY MEDICINE

## 2020-01-01 PROCEDURE — 3288F FALL RISK ASSESSMENT DOCD: CPT | Mod: CPTII,S$GLB,, | Performed by: INTERNAL MEDICINE

## 2020-01-01 PROCEDURE — 99999 PR PBB SHADOW E&M-EST. PATIENT-LVL IV: CPT | Mod: PBBFAC,HCNC,, | Performed by: INTERNAL MEDICINE

## 2020-01-01 PROCEDURE — 80048 BASIC METABOLIC PNL TOTAL CA: CPT

## 2020-01-01 PROCEDURE — 1100F PR PT FALLS ASSESS DOC 2+ FALLS/FALL W/INJURY/YR: ICD-10-PCS | Mod: CPTII,S$GLB,, | Performed by: PHYSICIAN ASSISTANT

## 2020-01-01 PROCEDURE — 1101F PT FALLS ASSESS-DOCD LE1/YR: CPT | Mod: CPTII,S$GLB,, | Performed by: INTERNAL MEDICINE

## 2020-01-01 PROCEDURE — 1159F PR MEDICATION LIST DOCUMENTED IN MEDICAL RECORD: ICD-10-PCS | Mod: HCNC,S$GLB,, | Performed by: INTERNAL MEDICINE

## 2020-01-01 PROCEDURE — 85730 THROMBOPLASTIN TIME PARTIAL: CPT

## 2020-01-01 PROCEDURE — 82728 ASSAY OF FERRITIN: CPT

## 2020-01-01 PROCEDURE — 3078F PR MOST RECENT DIASTOLIC BLOOD PRESSURE < 80 MM HG: ICD-10-PCS | Mod: CPTII,S$GLB,, | Performed by: INTERNAL MEDICINE

## 2020-01-01 PROCEDURE — 3079F PR MOST RECENT DIASTOLIC BLOOD PRESSURE 80-89 MM HG: ICD-10-PCS | Mod: CPTII,S$GLB,, | Performed by: INTERNAL MEDICINE

## 2020-01-01 PROCEDURE — 3288F FALL RISK ASSESSMENT DOCD: CPT | Mod: CPTII,S$GLB,, | Performed by: PHYSICIAN ASSISTANT

## 2020-01-01 PROCEDURE — 78815 PET IMAGE W/CT SKULL-THIGH: CPT | Mod: TC

## 2020-01-01 PROCEDURE — 20600001 HC STEP DOWN PRIVATE ROOM

## 2020-01-01 PROCEDURE — 3288F PR FALLS RISK ASSESSMENT DOCUMENTED: ICD-10-PCS | Mod: CPTII,S$GLB,, | Performed by: INTERNAL MEDICINE

## 2020-01-01 PROCEDURE — 94761 N-INVAS EAR/PLS OXIMETRY MLT: CPT

## 2020-01-01 PROCEDURE — 96361 HYDRATE IV INFUSION ADD-ON: CPT

## 2020-01-01 PROCEDURE — 90832 PSYTX W PT 30 MINUTES: CPT | Mod: ,,, | Performed by: PSYCHOLOGIST

## 2020-01-01 PROCEDURE — 12000002 HC ACUTE/MED SURGE SEMI-PRIVATE ROOM

## 2020-01-01 PROCEDURE — 80053 COMPREHEN METABOLIC PANEL: CPT

## 2020-01-01 PROCEDURE — 1159F MED LIST DOCD IN RCRD: CPT | Mod: S$GLB,,, | Performed by: NURSE PRACTITIONER

## 2020-01-01 PROCEDURE — 99233 PR SUBSEQUENT HOSPITAL CARE,LEVL III: ICD-10-PCS | Mod: GC,,, | Performed by: INTERNAL MEDICINE

## 2020-01-01 PROCEDURE — 99999 PR PBB SHADOW E&M-EST. PATIENT-LVL V: CPT | Mod: PBBFAC,,, | Performed by: INTERNAL MEDICINE

## 2020-01-01 PROCEDURE — 99999 PR PBB SHADOW E&M-EST. PATIENT-LVL III: ICD-10-PCS | Mod: PBBFAC,,, | Performed by: INTERNAL MEDICINE

## 2020-01-01 PROCEDURE — 99999 PR PBB SHADOW E&M-EST. PATIENT-LVL III: CPT | Mod: PBBFAC,,, | Performed by: INTERNAL MEDICINE

## 2020-01-01 PROCEDURE — 83690 ASSAY OF LIPASE: CPT

## 2020-01-01 PROCEDURE — 3077F SYST BP >= 140 MM HG: CPT | Mod: CPTII,S$GLB,, | Performed by: INTERNAL MEDICINE

## 2020-01-01 PROCEDURE — 99214 OFFICE O/P EST MOD 30 MIN: CPT | Mod: S$GLB,,, | Performed by: INTERNAL MEDICINE

## 2020-01-01 PROCEDURE — 83735 ASSAY OF MAGNESIUM: CPT

## 2020-01-01 PROCEDURE — 3288F PR FALLS RISK ASSESSMENT DOCUMENTED: ICD-10-PCS | Mod: CPTII,S$GLB,, | Performed by: PHYSICIAN ASSISTANT

## 2020-01-01 PROCEDURE — 3078F DIAST BP <80 MM HG: CPT | Mod: CPTII,S$GLB,, | Performed by: NURSE PRACTITIONER

## 2020-01-01 PROCEDURE — 99499 UNLISTED E&M SERVICE: CPT | Mod: S$GLB,,, | Performed by: INTERNAL MEDICINE

## 2020-01-01 PROCEDURE — 1126F PR PAIN SEVERITY QUANTIFIED, NO PAIN PRESENT: ICD-10-PCS | Mod: S$GLB,,, | Performed by: INTERNAL MEDICINE

## 2020-01-01 PROCEDURE — 99239 HOSP IP/OBS DSCHRG MGMT >30: CPT | Mod: GC,,, | Performed by: INTERNAL MEDICINE

## 2020-01-01 PROCEDURE — 97161 PT EVAL LOW COMPLEX 20 MIN: CPT

## 2020-01-01 PROCEDURE — 3075F SYST BP GE 130 - 139MM HG: CPT | Mod: CPTII,S$GLB,, | Performed by: NURSE PRACTITIONER

## 2020-01-01 PROCEDURE — 1101F PR PT FALLS ASSESS DOC 0-1 FALLS W/OUT INJ PAST YR: ICD-10-PCS | Mod: HCNC,CPTII,S$GLB, | Performed by: INTERNAL MEDICINE

## 2020-01-01 PROCEDURE — 99214 PR OFFICE/OUTPT VISIT, EST, LEVL IV, 30-39 MIN: ICD-10-PCS | Mod: S$GLB,,, | Performed by: INTERNAL MEDICINE

## 2020-01-01 PROCEDURE — 82746 ASSAY OF FOLIC ACID SERUM: CPT

## 2020-01-01 PROCEDURE — 1101F PR PT FALLS ASSESS DOC 0-1 FALLS W/OUT INJ PAST YR: ICD-10-PCS | Mod: CPTII,S$GLB,, | Performed by: INTERNAL MEDICINE

## 2020-01-01 PROCEDURE — 99215 PR OFFICE/OUTPT VISIT, EST, LEVL V, 40-54 MIN: ICD-10-PCS | Mod: S$GLB,,, | Performed by: INTERNAL MEDICINE

## 2020-01-01 PROCEDURE — 25000003 PHARM REV CODE 250: Performed by: INTERNAL MEDICINE

## 2020-01-01 PROCEDURE — 3079F PR MOST RECENT DIASTOLIC BLOOD PRESSURE 80-89 MM HG: ICD-10-PCS | Mod: CPTII,S$GLB,, | Performed by: NURSE PRACTITIONER

## 2020-01-01 PROCEDURE — 1126F PR PAIN SEVERITY QUANTIFIED, NO PAIN PRESENT: ICD-10-PCS | Mod: S$GLB,,, | Performed by: PHYSICIAN ASSISTANT

## 2020-01-01 PROCEDURE — P9021 RED BLOOD CELLS UNIT: HCPCS

## 2020-01-01 PROCEDURE — 3079F DIAST BP 80-89 MM HG: CPT | Mod: CPTII,S$GLB,, | Performed by: INTERNAL MEDICINE

## 2020-01-01 PROCEDURE — 99215 PR OFFICE/OUTPT VISIT, EST, LEVL V, 40-54 MIN: ICD-10-PCS | Mod: HCNC,S$GLB,, | Performed by: INTERNAL MEDICINE

## 2020-01-01 PROCEDURE — G0378 HOSPITAL OBSERVATION PER HR: HCPCS

## 2020-01-01 PROCEDURE — 1159F MED LIST DOCD IN RCRD: CPT | Mod: S$GLB,,, | Performed by: PHYSICIAN ASSISTANT

## 2020-01-01 PROCEDURE — 3288F PR FALLS RISK ASSESSMENT DOCUMENTED: ICD-10-PCS | Mod: HCNC,CPTII,S$GLB, | Performed by: INTERNAL MEDICINE

## 2020-01-01 PROCEDURE — 99215 OFFICE O/P EST HI 40 MIN: CPT | Mod: S$GLB,,, | Performed by: INTERNAL MEDICINE

## 2020-01-01 PROCEDURE — 99214 PR OFFICE/OUTPT VISIT, EST, LEVL IV, 30-39 MIN: ICD-10-PCS | Mod: S$GLB,,, | Performed by: PHYSICIAN ASSISTANT

## 2020-01-01 PROCEDURE — 1100F PTFALLS ASSESS-DOCD GE2>/YR: CPT | Mod: CPTII,S$GLB,, | Performed by: INTERNAL MEDICINE

## 2020-01-01 PROCEDURE — 96375 TX/PRO/DX INJ NEW DRUG ADDON: CPT

## 2020-01-01 PROCEDURE — 3077F PR MOST RECENT SYSTOLIC BLOOD PRESSURE >= 140 MM HG: ICD-10-PCS | Mod: HCNC,CPTII,S$GLB, | Performed by: INTERNAL MEDICINE

## 2020-01-01 PROCEDURE — 78815 PET IMAGE W/CT SKULL-THIGH: CPT | Mod: 26,PS,, | Performed by: RADIOLOGY

## 2020-01-01 PROCEDURE — 1101F PT FALLS ASSESS-DOCD LE1/YR: CPT | Mod: CPTII,S$GLB,, | Performed by: NURSE PRACTITIONER

## 2020-01-01 PROCEDURE — 99220 PR INITIAL OBSERVATION CARE,LEVL III: CPT | Mod: GC,,, | Performed by: INTERNAL MEDICINE

## 2020-01-01 PROCEDURE — 96365 THER/PROPH/DIAG IV INF INIT: CPT

## 2020-01-01 PROCEDURE — 99223 1ST HOSP IP/OBS HIGH 75: CPT | Mod: AI,,, | Performed by: INTERNAL MEDICINE

## 2020-01-01 PROCEDURE — 99349 HOME/RES VST EST MOD MDM 40: CPT | Mod: S$GLB,,, | Performed by: NURSE PRACTITIONER

## 2020-01-01 PROCEDURE — G0180 PR HOME HEALTH MD CERTIFICATION: ICD-10-PCS | Mod: ,,, | Performed by: INTERNAL MEDICINE

## 2020-01-01 PROCEDURE — 99284 PR EMERGENCY DEPT VISIT,LEVEL IV: ICD-10-PCS | Mod: CS,,, | Performed by: EMERGENCY MEDICINE

## 2020-01-01 PROCEDURE — 3075F PR MOST RECENT SYSTOLIC BLOOD PRESS GE 130-139MM HG: ICD-10-PCS | Mod: CPTII,S$GLB,, | Performed by: INTERNAL MEDICINE

## 2020-01-01 PROCEDURE — 93010 EKG 12-LEAD: ICD-10-PCS | Mod: ,,, | Performed by: INTERNAL MEDICINE

## 2020-01-01 PROCEDURE — 99497 ADVNCD CARE PLAN 30 MIN: CPT | Mod: S$GLB,,, | Performed by: NURSE PRACTITIONER

## 2020-01-01 PROCEDURE — U0002 COVID-19 LAB TEST NON-CDC: HCPCS | Performed by: STUDENT IN AN ORGANIZED HEALTH CARE EDUCATION/TRAINING PROGRAM

## 2020-01-01 PROCEDURE — 81003 URINALYSIS AUTO W/O SCOPE: CPT

## 2020-01-01 PROCEDURE — 82607 VITAMIN B-12: CPT

## 2020-01-01 PROCEDURE — 3074F SYST BP LT 130 MM HG: CPT | Mod: CPTII,S$GLB,, | Performed by: PHYSICIAN ASSISTANT

## 2020-01-01 PROCEDURE — 3078F DIAST BP <80 MM HG: CPT | Mod: CPTII,S$GLB,, | Performed by: INTERNAL MEDICINE

## 2020-01-01 PROCEDURE — 3078F DIAST BP <80 MM HG: CPT | Mod: HCNC,CPTII,S$GLB, | Performed by: INTERNAL MEDICINE

## 2020-01-01 PROCEDURE — 99233 SBSQ HOSP IP/OBS HIGH 50: CPT | Mod: GC,,, | Performed by: INTERNAL MEDICINE

## 2020-01-01 PROCEDURE — 81001 URINALYSIS AUTO W/SCOPE: CPT

## 2020-01-01 PROCEDURE — A9552 F18 FDG: HCPCS

## 2020-01-01 PROCEDURE — 1159F PR MEDICATION LIST DOCUMENTED IN MEDICAL RECORD: ICD-10-PCS | Mod: S$GLB,,, | Performed by: INTERNAL MEDICINE

## 2020-01-01 PROCEDURE — 1101F PR PT FALLS ASSESS DOC 0-1 FALLS W/OUT INJ PAST YR: ICD-10-PCS | Mod: CPTII,S$GLB,, | Performed by: NURSE PRACTITIONER

## 2020-01-01 PROCEDURE — 1159F PR MEDICATION LIST DOCUMENTED IN MEDICAL RECORD: ICD-10-PCS | Mod: S$GLB,,, | Performed by: PHYSICIAN ASSISTANT

## 2020-01-01 PROCEDURE — 99497 PR ADVNCD CARE PLAN 30 MIN: ICD-10-PCS | Mod: 25,S$GLB,, | Performed by: NURSE PRACTITIONER

## 2020-01-01 PROCEDURE — 3078F PR MOST RECENT DIASTOLIC BLOOD PRESSURE < 80 MM HG: ICD-10-PCS | Mod: CPTII,S$GLB,, | Performed by: PHYSICIAN ASSISTANT

## 2020-01-01 PROCEDURE — 99214 OFFICE O/P EST MOD 30 MIN: CPT | Mod: S$GLB,,, | Performed by: PHYSICIAN ASSISTANT

## 2020-01-01 PROCEDURE — 3075F SYST BP GE 130 - 139MM HG: CPT | Mod: CPTII,S$GLB,, | Performed by: INTERNAL MEDICINE

## 2020-01-01 PROCEDURE — 78815 NM PET CT ROUTINE: ICD-10-PCS | Mod: 26,PS,, | Performed by: RADIOLOGY

## 2020-01-01 PROCEDURE — 36430 TRANSFUSION BLD/BLD COMPNT: CPT

## 2020-01-01 PROCEDURE — 1159F PR MEDICATION LIST DOCUMENTED IN MEDICAL RECORD: ICD-10-PCS | Mod: S$GLB,,, | Performed by: NURSE PRACTITIONER

## 2020-01-01 PROCEDURE — 99349 PR HOME VISIT,ESTAB PATIENT,LEVEL III: ICD-10-PCS | Mod: S$GLB,,, | Performed by: NURSE PRACTITIONER

## 2020-01-01 PROCEDURE — 85027 COMPLETE CBC AUTOMATED: CPT | Mod: HCNC

## 2020-01-01 PROCEDURE — 3077F PR MOST RECENT SYSTOLIC BLOOD PRESSURE >= 140 MM HG: ICD-10-PCS | Mod: CPTII,S$GLB,, | Performed by: INTERNAL MEDICINE

## 2020-01-01 PROCEDURE — 93010 ELECTROCARDIOGRAM REPORT: CPT | Mod: ,,, | Performed by: INTERNAL MEDICINE

## 2020-01-01 PROCEDURE — 96360 HYDRATION IV INFUSION INIT: CPT

## 2020-01-01 PROCEDURE — 1100F PTFALLS ASSESS-DOCD GE2>/YR: CPT | Mod: CPTII,S$GLB,, | Performed by: PHYSICIAN ASSISTANT

## 2020-01-01 PROCEDURE — 25000003 PHARM REV CODE 250: Mod: GA | Performed by: STUDENT IN AN ORGANIZED HEALTH CARE EDUCATION/TRAINING PROGRAM

## 2020-01-01 PROCEDURE — 3077F SYST BP >= 140 MM HG: CPT | Mod: HCNC,CPTII,S$GLB, | Performed by: INTERNAL MEDICINE

## 2020-01-01 PROCEDURE — 3075F PR MOST RECENT SYSTOLIC BLOOD PRESS GE 130-139MM HG: ICD-10-PCS | Mod: CPTII,S$GLB,, | Performed by: NURSE PRACTITIONER

## 2020-01-01 PROCEDURE — 99217 PR OBSERVATION CARE DISCHARGE: ICD-10-PCS | Mod: GC,,, | Performed by: INTERNAL MEDICINE

## 2020-01-01 PROCEDURE — 99215 OFFICE O/P EST HI 40 MIN: CPT | Mod: HCNC,S$GLB,, | Performed by: INTERNAL MEDICINE

## 2020-01-01 PROCEDURE — 3078F PR MOST RECENT DIASTOLIC BLOOD PRESSURE < 80 MM HG: ICD-10-PCS | Mod: CPTII,S$GLB,, | Performed by: NURSE PRACTITIONER

## 2020-01-01 PROCEDURE — 80053 COMPREHEN METABOLIC PANEL: CPT | Mod: HCNC

## 2020-01-01 PROCEDURE — 99999 PR PBB SHADOW E&M-EST. PATIENT-LVL V: ICD-10-PCS | Mod: PBBFAC,,, | Performed by: INTERNAL MEDICINE

## 2020-01-01 PROCEDURE — 1126F PR PAIN SEVERITY QUANTIFIED, NO PAIN PRESENT: ICD-10-PCS | Mod: HCNC,S$GLB,, | Performed by: INTERNAL MEDICINE

## 2020-01-01 PROCEDURE — 99999 PR PBB SHADOW E&M-EST. PATIENT-LVL IV: ICD-10-PCS | Mod: PBBFAC,HCNC,, | Performed by: INTERNAL MEDICINE

## 2020-01-01 PROCEDURE — 99999 PR PBB SHADOW E&M-EST. PATIENT-LVL V: CPT | Mod: PBBFAC,,, | Performed by: PHYSICIAN ASSISTANT

## 2020-01-01 PROCEDURE — 1101F PT FALLS ASSESS-DOCD LE1/YR: CPT | Mod: HCNC,CPTII,S$GLB, | Performed by: INTERNAL MEDICINE

## 2020-01-01 PROCEDURE — 3078F DIAST BP <80 MM HG: CPT | Mod: CPTII,S$GLB,, | Performed by: PHYSICIAN ASSISTANT

## 2020-01-01 PROCEDURE — 99350 PR HOME VISIT,ESTAB PATIENT,LEVEL IV: ICD-10-PCS | Mod: S$GLB,,, | Performed by: NURSE PRACTITIONER

## 2020-01-01 PROCEDURE — 84300 ASSAY OF URINE SODIUM: CPT

## 2020-01-01 PROCEDURE — 83880 ASSAY OF NATRIURETIC PEPTIDE: CPT

## 2020-01-01 PROCEDURE — 87040 BLOOD CULTURE FOR BACTERIA: CPT

## 2020-01-01 PROCEDURE — 1126F AMNT PAIN NOTED NONE PRSNT: CPT | Mod: S$GLB,,, | Performed by: PHYSICIAN ASSISTANT

## 2020-01-01 PROCEDURE — 99499 RISK ADDL DX/OHS AUDIT: ICD-10-PCS | Mod: S$GLB,,, | Performed by: PHYSICIAN ASSISTANT

## 2020-01-01 PROCEDURE — 99285 EMERGENCY DEPT VISIT HI MDM: CPT | Mod: 25

## 2020-01-01 PROCEDURE — 87186 SC STD MICRODIL/AGAR DIL: CPT

## 2020-01-01 PROCEDURE — 99215 PR OFFICE/OUTPT VISIT, EST, LEVL V, 40-54 MIN: ICD-10-PCS | Mod: ,,, | Performed by: INTERNAL MEDICINE

## 2020-01-01 PROCEDURE — 96374 THER/PROPH/DIAG INJ IV PUSH: CPT

## 2020-01-01 PROCEDURE — 83010 ASSAY OF HAPTOGLOBIN QUANT: CPT

## 2020-01-01 PROCEDURE — 84133 ASSAY OF URINE POTASSIUM: CPT

## 2020-01-01 PROCEDURE — 83540 ASSAY OF IRON: CPT

## 2020-01-01 PROCEDURE — 63600175 PHARM REV CODE 636 W HCPCS: Performed by: INTERNAL MEDICINE

## 2020-01-01 PROCEDURE — 1100F PR PT FALLS ASSESS DOC 2+ FALLS/FALL W/INJURY/YR: ICD-10-PCS | Mod: CPTII,S$GLB,, | Performed by: INTERNAL MEDICINE

## 2020-01-01 PROCEDURE — 99223 PR INITIAL HOSPITAL CARE,LEVL III: ICD-10-PCS | Mod: AI,,, | Performed by: INTERNAL MEDICINE

## 2020-01-01 PROCEDURE — 99999 PR PBB SHADOW E&M-EST. PATIENT-LVL V: ICD-10-PCS | Mod: PBBFAC,,, | Performed by: PHYSICIAN ASSISTANT

## 2020-01-01 PROCEDURE — 82330 ASSAY OF CALCIUM: CPT

## 2020-01-01 PROCEDURE — 83935 ASSAY OF URINE OSMOLALITY: CPT

## 2020-01-01 PROCEDURE — 99499 UNLISTED E&M SERVICE: CPT | Mod: S$GLB,,, | Performed by: PHYSICIAN ASSISTANT

## 2020-01-01 PROCEDURE — 85610 PROTHROMBIN TIME: CPT

## 2020-01-01 PROCEDURE — 3078F PR MOST RECENT DIASTOLIC BLOOD PRESSURE < 80 MM HG: ICD-10-PCS | Mod: HCNC,CPTII,S$GLB, | Performed by: INTERNAL MEDICINE

## 2020-01-01 PROCEDURE — 3288F FALL RISK ASSESSMENT DOCD: CPT | Mod: HCNC,CPTII,S$GLB, | Performed by: INTERNAL MEDICINE

## 2020-01-01 PROCEDURE — 36415 COLL VENOUS BLD VENIPUNCTURE: CPT | Mod: HCNC

## 2020-01-01 PROCEDURE — G0180 MD CERTIFICATION HHA PATIENT: HCPCS | Mod: ,,, | Performed by: INTERNAL MEDICINE

## 2020-01-01 PROCEDURE — 99284 EMERGENCY DEPT VISIT MOD MDM: CPT | Mod: CS,,, | Performed by: EMERGENCY MEDICINE

## 2020-01-01 PROCEDURE — 99285 PR EMERGENCY DEPT VISIT,LEVEL V: ICD-10-PCS | Mod: ,,, | Performed by: EMERGENCY MEDICINE

## 2020-01-01 PROCEDURE — 3079F DIAST BP 80-89 MM HG: CPT | Mod: CPTII,S$GLB,, | Performed by: NURSE PRACTITIONER

## 2020-01-01 PROCEDURE — 99220 PR INITIAL OBSERVATION CARE,LEVL III: ICD-10-PCS | Mod: GC,,, | Performed by: INTERNAL MEDICINE

## 2020-01-01 PROCEDURE — 1126F AMNT PAIN NOTED NONE PRSNT: CPT | Mod: HCNC,S$GLB,, | Performed by: INTERNAL MEDICINE

## 2020-01-01 PROCEDURE — 99497 ADVNCD CARE PLAN 30 MIN: CPT | Mod: 25,S$GLB,, | Performed by: NURSE PRACTITIONER

## 2020-01-01 PROCEDURE — 86920 COMPATIBILITY TEST SPIN: CPT

## 2020-01-01 PROCEDURE — 97535 SELF CARE MNGMENT TRAINING: CPT

## 2020-01-01 PROCEDURE — 99285 EMERGENCY DEPT VISIT HI MDM: CPT | Mod: ,,, | Performed by: EMERGENCY MEDICINE

## 2020-01-01 PROCEDURE — 97165 OT EVAL LOW COMPLEX 30 MIN: CPT

## 2020-01-01 PROCEDURE — 1159F MED LIST DOCD IN RCRD: CPT | Mod: HCNC,S$GLB,, | Performed by: INTERNAL MEDICINE

## 2020-01-01 PROCEDURE — 27201040 HC RC 50 FILTER

## 2020-01-01 PROCEDURE — 87502 INFLUENZA DNA AMP PROBE: CPT

## 2020-01-01 PROCEDURE — 97116 GAIT TRAINING THERAPY: CPT

## 2020-01-01 PROCEDURE — 86901 BLOOD TYPING SEROLOGIC RH(D): CPT

## 2020-01-01 PROCEDURE — 99239 PR HOSPITAL DISCHARGE DAY,>30 MIN: ICD-10-PCS | Mod: GC,,, | Performed by: INTERNAL MEDICINE

## 2020-01-01 PROCEDURE — 63600175 PHARM REV CODE 636 W HCPCS: Performed by: EMERGENCY MEDICINE

## 2020-01-01 PROCEDURE — 84540 ASSAY OF URINE/UREA-N: CPT

## 2020-01-01 PROCEDURE — 97530 THERAPEUTIC ACTIVITIES: CPT

## 2020-01-01 PROCEDURE — 87086 URINE CULTURE/COLONY COUNT: CPT

## 2020-01-01 PROCEDURE — 93005 ELECTROCARDIOGRAM TRACING: CPT

## 2020-01-01 PROCEDURE — 99217 PR OBSERVATION CARE DISCHARGE: CPT | Mod: GC,,, | Performed by: INTERNAL MEDICINE

## 2020-01-01 PROCEDURE — 99350 HOME/RES VST EST HIGH MDM 60: CPT | Mod: S$GLB,,, | Performed by: NURSE PRACTITIONER

## 2020-01-01 PROCEDURE — 87040 BLOOD CULTURE FOR BACTERIA: CPT | Mod: 59

## 2020-01-01 PROCEDURE — 97110 THERAPEUTIC EXERCISES: CPT

## 2020-01-01 PROCEDURE — 83605 ASSAY OF LACTIC ACID: CPT

## 2020-01-01 RX ORDER — POLYETHYLENE GLYCOL 3350 17 G/17G
17 POWDER, FOR SOLUTION ORAL DAILY
Status: DISCONTINUED | OUTPATIENT
Start: 2020-01-01 | End: 2020-01-01 | Stop reason: HOSPADM

## 2020-01-01 RX ORDER — AMLODIPINE BESYLATE 5 MG/1
5 TABLET ORAL DAILY
Qty: 30 TABLET | Refills: 11 | Status: ON HOLD | OUTPATIENT
Start: 2020-01-01 | End: 2021-01-01 | Stop reason: HOSPADM

## 2020-01-01 RX ORDER — CIPROFLOXACIN 500 MG/1
500 TABLET ORAL DAILY
Qty: 13 TABLET | Refills: 0 | Status: SHIPPED | OUTPATIENT
Start: 2020-01-01 | End: 2020-01-01

## 2020-01-01 RX ORDER — DRONABINOL 5 MG/1
5 CAPSULE ORAL
Qty: 60 CAPSULE | Refills: 3 | Status: ON HOLD | OUTPATIENT
Start: 2020-01-01 | End: 2021-01-01 | Stop reason: HOSPADM

## 2020-01-01 RX ORDER — PANCRELIPASE 60000; 12000; 38000 [USP'U]/1; [USP'U]/1; [USP'U]/1
1 CAPSULE, DELAYED RELEASE PELLETS ORAL
Qty: 90 CAPSULE | Refills: 6 | Status: SHIPPED | OUTPATIENT
Start: 2020-01-01 | End: 2021-01-01 | Stop reason: SDUPTHER

## 2020-01-01 RX ORDER — CIPROFLOXACIN 500 MG/1
500 TABLET ORAL EVERY 24 HOURS
Status: DISCONTINUED | OUTPATIENT
Start: 2020-01-01 | End: 2020-01-01 | Stop reason: HOSPADM

## 2020-01-01 RX ORDER — ACETAMINOPHEN 500 MG
1000 TABLET ORAL
Status: COMPLETED | OUTPATIENT
Start: 2020-01-01 | End: 2020-01-01

## 2020-01-01 RX ORDER — CALCIUM CARBONATE 500(1250)
1000 TABLET ORAL DAILY
Status: DISCONTINUED | OUTPATIENT
Start: 2020-01-01 | End: 2020-01-01 | Stop reason: HOSPADM

## 2020-01-01 RX ORDER — LORAZEPAM 0.5 MG/1
1 TABLET ORAL EVERY 12 HOURS PRN
Status: DISCONTINUED | OUTPATIENT
Start: 2020-01-01 | End: 2020-01-01 | Stop reason: HOSPADM

## 2020-01-01 RX ORDER — LORAZEPAM 1 MG/1
1 TABLET ORAL 2 TIMES DAILY PRN
Status: DISCONTINUED | OUTPATIENT
Start: 2020-01-01 | End: 2020-01-01 | Stop reason: HOSPADM

## 2020-01-01 RX ORDER — CEFEPIME HYDROCHLORIDE 2 G/1
2 INJECTION, POWDER, FOR SOLUTION INTRAVENOUS ONCE
Status: COMPLETED | OUTPATIENT
Start: 2020-01-01 | End: 2020-01-01

## 2020-01-01 RX ORDER — ERLOTINIB HYDROCHLORIDE 150 MG/1
TABLET, FILM COATED ORAL
COMMUNITY
Start: 2020-01-01 | End: 2020-01-01

## 2020-01-01 RX ORDER — IBUPROFEN 200 MG
16 TABLET ORAL
Status: DISCONTINUED | OUTPATIENT
Start: 2020-01-01 | End: 2020-01-01 | Stop reason: HOSPADM

## 2020-01-01 RX ORDER — SODIUM CHLORIDE 0.9 % (FLUSH) 0.9 %
10 SYRINGE (ML) INJECTION
Status: DISCONTINUED | OUTPATIENT
Start: 2020-01-01 | End: 2020-01-01 | Stop reason: HOSPADM

## 2020-01-01 RX ORDER — IBUPROFEN 200 MG
24 TABLET ORAL
Status: DISCONTINUED | OUTPATIENT
Start: 2020-01-01 | End: 2020-01-01 | Stop reason: HOSPADM

## 2020-01-01 RX ORDER — SODIUM CHLORIDE 9 MG/ML
INJECTION, SOLUTION INTRAVENOUS CONTINUOUS
Status: DISCONTINUED | OUTPATIENT
Start: 2020-01-01 | End: 2020-01-01

## 2020-01-01 RX ORDER — IPRATROPIUM BROMIDE AND ALBUTEROL SULFATE 2.5; .5 MG/3ML; MG/3ML
3 SOLUTION RESPIRATORY (INHALATION) EVERY 6 HOURS PRN
Status: DISCONTINUED | OUTPATIENT
Start: 2020-01-01 | End: 2020-01-01 | Stop reason: HOSPADM

## 2020-01-01 RX ORDER — HYDROCODONE BITARTRATE AND ACETAMINOPHEN 500; 5 MG/1; MG/1
TABLET ORAL
Status: DISCONTINUED | OUTPATIENT
Start: 2020-01-01 | End: 2020-01-01 | Stop reason: HOSPADM

## 2020-01-01 RX ORDER — AMMONIUM LACTATE 12 G/100G
LOTION TOPICAL
Qty: 1 BOTTLE | Refills: 4 | Status: SHIPPED | OUTPATIENT
Start: 2020-01-01 | End: 2021-01-01 | Stop reason: SDUPTHER

## 2020-01-01 RX ORDER — OXYBUTYNIN CHLORIDE 5 MG/1
5 TABLET, EXTENDED RELEASE ORAL DAILY
Status: DISCONTINUED | OUTPATIENT
Start: 2020-01-01 | End: 2020-01-01 | Stop reason: HOSPADM

## 2020-01-01 RX ORDER — SODIUM BICARBONATE 650 MG/1
650 TABLET ORAL 3 TIMES DAILY
Status: DISCONTINUED | OUTPATIENT
Start: 2020-01-01 | End: 2020-01-01 | Stop reason: HOSPADM

## 2020-01-01 RX ORDER — GLUCAGON 1 MG
1 KIT INJECTION
Status: DISCONTINUED | OUTPATIENT
Start: 2020-01-01 | End: 2020-01-01 | Stop reason: HOSPADM

## 2020-01-01 RX ORDER — AMITRIPTYLINE HYDROCHLORIDE 50 MG/1
50 TABLET, FILM COATED ORAL NIGHTLY
Status: DISCONTINUED | OUTPATIENT
Start: 2020-01-01 | End: 2020-01-01 | Stop reason: HOSPADM

## 2020-01-01 RX ORDER — TALC
6 POWDER (GRAM) TOPICAL NIGHTLY PRN
Status: DISCONTINUED | OUTPATIENT
Start: 2020-01-01 | End: 2020-01-01 | Stop reason: HOSPADM

## 2020-01-01 RX ORDER — SODIUM CHLORIDE 0.9 % (FLUSH) 0.9 %
10 SYRINGE (ML) INJECTION
Status: DISCONTINUED | OUTPATIENT
Start: 2020-01-01 | End: 2020-01-01

## 2020-01-01 RX ORDER — LEVETIRACETAM 500 MG/1
500 TABLET ORAL 2 TIMES DAILY
Status: DISCONTINUED | OUTPATIENT
Start: 2020-01-01 | End: 2020-01-01 | Stop reason: HOSPADM

## 2020-01-01 RX ORDER — AMLODIPINE BESYLATE 5 MG/1
5 TABLET ORAL DAILY
Status: DISCONTINUED | OUTPATIENT
Start: 2020-01-01 | End: 2020-01-01 | Stop reason: HOSPADM

## 2020-01-01 RX ORDER — ONDANSETRON 8 MG/1
8 TABLET, ORALLY DISINTEGRATING ORAL EVERY 8 HOURS PRN
Status: DISCONTINUED | OUTPATIENT
Start: 2020-01-01 | End: 2020-01-01 | Stop reason: HOSPADM

## 2020-01-01 RX ORDER — TALC
6 POWDER (GRAM) TOPICAL NIGHTLY PRN
Status: DISCONTINUED | OUTPATIENT
Start: 2020-01-01 | End: 2020-01-01

## 2020-01-01 RX ORDER — MAGNESIUM SULFATE HEPTAHYDRATE 40 MG/ML
2 INJECTION, SOLUTION INTRAVENOUS
Status: COMPLETED | OUTPATIENT
Start: 2020-01-01 | End: 2020-01-01

## 2020-01-01 RX ADMIN — AMLODIPINE BESYLATE 5 MG: 5 TABLET ORAL at 08:10

## 2020-01-01 RX ADMIN — LORAZEPAM 1 MG: 1 TABLET ORAL at 08:11

## 2020-01-01 RX ADMIN — DIBASIC SODIUM PHOSPHATE, MONOBASIC POTASSIUM PHOSPHATE AND MONOBASIC SODIUM PHOSPHATE 1 TABLET: 852; 155; 130 TABLET ORAL at 12:11

## 2020-01-01 RX ADMIN — APIXABAN 5 MG: 2.5 TABLET, FILM COATED ORAL at 08:10

## 2020-01-01 RX ADMIN — LORAZEPAM 1 MG: 0.5 TABLET ORAL at 08:10

## 2020-01-01 RX ADMIN — SODIUM CHLORIDE: 0.9 INJECTION, SOLUTION INTRAVENOUS at 04:10

## 2020-01-01 RX ADMIN — LEVETIRACETAM 500 MG: 500 TABLET ORAL at 08:10

## 2020-01-01 RX ADMIN — PANCRELIPASE 1 CAPSULE: 60000; 12000; 38000 CAPSULE, DELAYED RELEASE PELLETS ORAL at 05:11

## 2020-01-01 RX ADMIN — CALCIUM 1000 MG: 500 TABLET ORAL at 08:10

## 2020-01-01 RX ADMIN — SODIUM CHLORIDE, SODIUM LACTATE, POTASSIUM CHLORIDE, AND CALCIUM CHLORIDE 1000 ML: .6; .31; .03; .02 INJECTION, SOLUTION INTRAVENOUS at 12:11

## 2020-01-01 RX ADMIN — AMITRIPTYLINE HYDROCHLORIDE 50 MG: 50 TABLET, FILM COATED ORAL at 01:11

## 2020-01-01 RX ADMIN — LEVETIRACETAM 500 MG: 500 TABLET ORAL at 08:11

## 2020-01-01 RX ADMIN — AMITRIPTYLINE HYDROCHLORIDE 50 MG: 50 TABLET, FILM COATED ORAL at 08:11

## 2020-01-01 RX ADMIN — CALCIUM GLUCONATE 1 G: 98 INJECTION, SOLUTION INTRAVENOUS at 12:10

## 2020-01-01 RX ADMIN — SODIUM CHLORIDE 1000 ML: 9 INJECTION, SOLUTION INTRAVENOUS at 02:10

## 2020-01-01 RX ADMIN — CEFEPIME 2 G: 2 INJECTION, POWDER, FOR SOLUTION INTRAVENOUS at 10:11

## 2020-01-01 RX ADMIN — PANCRELIPASE 1 CAPSULE: 60000; 12000; 38000 CAPSULE, DELAYED RELEASE PELLETS ORAL at 09:11

## 2020-01-01 RX ADMIN — OXYBUTYNIN CHLORIDE 5 MG: 5 TABLET, EXTENDED RELEASE ORAL at 08:10

## 2020-01-01 RX ADMIN — AMLODIPINE BESYLATE 5 MG: 5 TABLET ORAL at 03:11

## 2020-01-01 RX ADMIN — APIXABAN 5 MG: 5 TABLET, FILM COATED ORAL at 08:11

## 2020-01-01 RX ADMIN — PIPERACILLIN AND TAZOBACTAM 4.5 G: 4; .5 INJECTION, POWDER, LYOPHILIZED, FOR SOLUTION INTRAVENOUS; PARENTERAL at 06:11

## 2020-01-01 RX ADMIN — PANCRELIPASE 1 CAPSULE: 60000; 12000; 38000 CAPSULE, DELAYED RELEASE PELLETS ORAL at 11:11

## 2020-01-01 RX ADMIN — AMLODIPINE BESYLATE 5 MG: 5 TABLET ORAL at 08:11

## 2020-01-01 RX ADMIN — PANCRELIPASE 1 CAPSULE: 60000; 12000; 38000 CAPSULE, DELAYED RELEASE PELLETS ORAL at 12:10

## 2020-01-01 RX ADMIN — PANCRELIPASE 1 CAPSULE: 60000; 12000; 38000 CAPSULE, DELAYED RELEASE PELLETS ORAL at 04:10

## 2020-01-01 RX ADMIN — SODIUM CHLORIDE, SODIUM LACTATE, POTASSIUM CHLORIDE, AND CALCIUM CHLORIDE 250 ML: .6; .31; .03; .02 INJECTION, SOLUTION INTRAVENOUS at 10:11

## 2020-01-01 RX ADMIN — ACETAMINOPHEN 1000 MG: 500 TABLET ORAL at 09:11

## 2020-01-01 RX ADMIN — LEVETIRACETAM 500 MG: 500 TABLET ORAL at 09:11

## 2020-01-01 RX ADMIN — MAGNESIUM SULFATE IN WATER 2 G: 40 INJECTION, SOLUTION INTRAVENOUS at 12:11

## 2020-01-01 RX ADMIN — SODIUM BICARBONATE 650 MG: 650 TABLET ORAL at 08:10

## 2020-01-01 RX ADMIN — PANCRELIPASE 1 CAPSULE: 60000; 12000; 38000 CAPSULE, DELAYED RELEASE PELLETS ORAL at 08:11

## 2020-01-01 RX ADMIN — VANCOMYCIN HYDROCHLORIDE 1500 MG: 1.5 INJECTION, POWDER, LYOPHILIZED, FOR SOLUTION INTRAVENOUS at 10:11

## 2020-01-01 RX ADMIN — PANCRELIPASE 1 CAPSULE: 60000; 12000; 38000 CAPSULE, DELAYED RELEASE PELLETS ORAL at 08:10

## 2020-01-01 RX ADMIN — SODIUM BICARBONATE 650 MG: 650 TABLET ORAL at 04:10

## 2020-01-01 RX ADMIN — PANCRELIPASE 1 CAPSULE: 60000; 12000; 38000 CAPSULE, DELAYED RELEASE PELLETS ORAL at 01:11

## 2020-01-01 RX ADMIN — CALCIUM GLUCONATE 1000 MG: 98 INJECTION, SOLUTION INTRAVENOUS at 04:11

## 2020-01-01 RX ADMIN — PIPERACILLIN AND TAZOBACTAM 4.5 G: 4; .5 INJECTION, POWDER, LYOPHILIZED, FOR SOLUTION INTRAVENOUS; PARENTERAL at 07:11

## 2020-01-01 RX ADMIN — APIXABAN 5 MG: 5 TABLET, FILM COATED ORAL at 09:11

## 2020-01-01 RX ADMIN — PANCRELIPASE 1 CAPSULE: 60000; 12000; 38000 CAPSULE, DELAYED RELEASE PELLETS ORAL at 12:11

## 2020-01-01 RX ADMIN — SODIUM CHLORIDE: 0.9 INJECTION, SOLUTION INTRAVENOUS at 08:10

## 2020-01-01 RX ADMIN — AMITRIPTYLINE HYDROCHLORIDE 50 MG: 50 TABLET, FILM COATED ORAL at 09:11

## 2020-01-01 RX ADMIN — CIPROFLOXACIN 500 MG: 500 TABLET, FILM COATED ORAL at 10:11

## 2020-01-01 RX ADMIN — AMITRIPTYLINE HYDROCHLORIDE 50 MG: 50 TABLET, FILM COATED ORAL at 08:10

## 2020-01-01 RX ADMIN — SODIUM CHLORIDE: 0.9 INJECTION, SOLUTION INTRAVENOUS at 12:10

## 2020-01-01 RX ADMIN — POLYETHYLENE GLYCOL 3350 17 G: 17 POWDER, FOR SOLUTION ORAL at 09:11

## 2020-01-01 RX ADMIN — SODIUM CHLORIDE, SODIUM LACTATE, POTASSIUM CHLORIDE, AND CALCIUM CHLORIDE 1000 ML: .6; .31; .03; .02 INJECTION, SOLUTION INTRAVENOUS at 04:10

## 2020-01-01 RX ADMIN — LORAZEPAM 1 MG: 1 TABLET ORAL at 02:11

## 2020-01-01 RX ADMIN — SODIUM CHLORIDE 1000 ML: 9 INJECTION, SOLUTION INTRAVENOUS at 12:10

## 2020-01-16 ENCOUNTER — HOSPITAL ENCOUNTER (INPATIENT)
Facility: HOSPITAL | Age: 75
LOS: 5 days | Discharge: HOME HEALTH | DRG: 872 | End: 2020-01-21
Attending: EMERGENCY MEDICINE | Admitting: EMERGENCY MEDICINE
Payer: MEDICARE

## 2020-01-16 DIAGNOSIS — N18.30 CKD (CHRONIC KIDNEY DISEASE) STAGE 3, GFR 30-59 ML/MIN: ICD-10-CM

## 2020-01-16 DIAGNOSIS — C34.31 MALIGNANT NEOPLASM OF LOWER LOBE OF RIGHT LUNG: Primary | ICD-10-CM

## 2020-01-16 DIAGNOSIS — R79.89 ELEVATED LFTS: ICD-10-CM

## 2020-01-16 DIAGNOSIS — I10 ESSENTIAL HYPERTENSION: ICD-10-CM

## 2020-01-16 DIAGNOSIS — C34.91 MALIGNANT NEOPLASM OF RIGHT LUNG, UNSPECIFIED PART OF LUNG: ICD-10-CM

## 2020-01-16 DIAGNOSIS — A41.9 SEPSIS, DUE TO UNSPECIFIED ORGANISM, UNSPECIFIED WHETHER ACUTE ORGAN DYSFUNCTION PRESENT: ICD-10-CM

## 2020-01-16 DIAGNOSIS — R50.9 FEBRILE ILLNESS, ACUTE: ICD-10-CM

## 2020-01-16 DIAGNOSIS — R74.01 TRANSAMINITIS: ICD-10-CM

## 2020-01-16 DIAGNOSIS — Z86.718 HISTORY OF DEEP VEIN THROMBOSIS (DVT) OF LOWER EXTREMITY: ICD-10-CM

## 2020-01-16 DIAGNOSIS — R53.81 DEBILITY: ICD-10-CM

## 2020-01-16 DIAGNOSIS — R00.0 TACHYCARDIA: ICD-10-CM

## 2020-01-16 LAB
ALBUMIN SERPL BCP-MCNC: 2.4 G/DL (ref 3.5–5.2)
ALP SERPL-CCNC: 321 U/L (ref 55–135)
ALT SERPL W/O P-5'-P-CCNC: 357 U/L (ref 10–44)
ANION GAP SERPL CALC-SCNC: 9 MMOL/L (ref 8–16)
AST SERPL-CCNC: 1300 U/L (ref 10–40)
BACTERIA #/AREA URNS AUTO: NORMAL /HPF
BASOPHILS # BLD AUTO: 0.01 K/UL (ref 0–0.2)
BASOPHILS NFR BLD: 0.2 % (ref 0–1.9)
BILIRUB SERPL-MCNC: 2 MG/DL (ref 0.1–1)
BILIRUB UR QL STRIP: NEGATIVE
BUN SERPL-MCNC: 22 MG/DL (ref 8–23)
CALCIUM SERPL-MCNC: 7.3 MG/DL (ref 8.7–10.5)
CHLORIDE SERPL-SCNC: 118 MMOL/L (ref 95–110)
CLARITY UR REFRACT.AUTO: CLEAR
CO2 SERPL-SCNC: 15 MMOL/L (ref 23–29)
COLOR UR AUTO: YELLOW
CREAT SERPL-MCNC: 1.5 MG/DL (ref 0.5–1.4)
DIFFERENTIAL METHOD: ABNORMAL
EOSINOPHIL # BLD AUTO: 0 K/UL (ref 0–0.5)
EOSINOPHIL NFR BLD: 0.2 % (ref 0–8)
ERYTHROCYTE [DISTWIDTH] IN BLOOD BY AUTOMATED COUNT: 14.8 % (ref 11.5–14.5)
EST. GFR  (AFRICAN AMERICAN): 39 ML/MIN/1.73 M^2
EST. GFR  (NON AFRICAN AMERICAN): 33.8 ML/MIN/1.73 M^2
GLUCOSE SERPL-MCNC: 73 MG/DL (ref 70–110)
GLUCOSE UR QL STRIP: NEGATIVE
HCT VFR BLD AUTO: 29.7 % (ref 37–48.5)
HGB BLD-MCNC: 8.9 G/DL (ref 12–16)
HGB UR QL STRIP: NEGATIVE
IMM GRANULOCYTES # BLD AUTO: 0.02 K/UL (ref 0–0.04)
IMM GRANULOCYTES NFR BLD AUTO: 0.4 % (ref 0–0.5)
INFLUENZA A, MOLECULAR: NEGATIVE
INFLUENZA B, MOLECULAR: NEGATIVE
KETONES UR QL STRIP: NEGATIVE
LACTATE SERPL-SCNC: 1.6 MMOL/L (ref 0.5–2.2)
LEUKOCYTE ESTERASE UR QL STRIP: NEGATIVE
LYMPHOCYTES # BLD AUTO: 0.3 K/UL (ref 1–4.8)
LYMPHOCYTES NFR BLD: 5.6 % (ref 18–48)
MAGNESIUM SERPL-MCNC: 1.4 MG/DL (ref 1.6–2.6)
MCH RBC QN AUTO: 28 PG (ref 27–31)
MCHC RBC AUTO-ENTMCNC: 30 G/DL (ref 32–36)
MCV RBC AUTO: 93 FL (ref 82–98)
MICROSCOPIC COMMENT: NORMAL
MONOCYTES # BLD AUTO: 0.2 K/UL (ref 0.3–1)
MONOCYTES NFR BLD: 4 % (ref 4–15)
NEUTROPHILS # BLD AUTO: 4.8 K/UL (ref 1.8–7.7)
NEUTROPHILS NFR BLD: 89.6 % (ref 38–73)
NITRITE UR QL STRIP: NEGATIVE
NRBC BLD-RTO: 0 /100 WBC
PH UR STRIP: 6 [PH] (ref 5–8)
PHOSPHATE SERPL-MCNC: 1.7 MG/DL (ref 2.7–4.5)
PLATELET # BLD AUTO: 178 K/UL (ref 150–350)
PMV BLD AUTO: 10.7 FL (ref 9.2–12.9)
POTASSIUM SERPL-SCNC: 3.1 MMOL/L (ref 3.5–5.1)
PROCALCITONIN SERPL IA-MCNC: 2.52 NG/ML
PROT SERPL-MCNC: 4.9 G/DL (ref 6–8.4)
PROT UR QL STRIP: NEGATIVE
RBC # BLD AUTO: 3.18 M/UL (ref 4–5.4)
RBC #/AREA URNS AUTO: 1 /HPF (ref 0–4)
SODIUM SERPL-SCNC: 142 MMOL/L (ref 136–145)
SP GR UR STRIP: 1.01 (ref 1–1.03)
SPECIMEN SOURCE: NORMAL
TROPONIN I SERPL DL<=0.01 NG/ML-MCNC: 0.04 NG/ML (ref 0–0.03)
URN SPEC COLLECT METH UR: NORMAL
WBC # BLD AUTO: 5.31 K/UL (ref 3.9–12.7)
WBC #/AREA URNS AUTO: 0 /HPF (ref 0–5)

## 2020-01-16 PROCEDURE — 84145 PROCALCITONIN (PCT): CPT | Mod: HCNC

## 2020-01-16 PROCEDURE — 99285 EMERGENCY DEPT VISIT HI MDM: CPT | Mod: ,,, | Performed by: PHYSICIAN ASSISTANT

## 2020-01-16 PROCEDURE — 12000002 HC ACUTE/MED SURGE SEMI-PRIVATE ROOM: Mod: HCNC

## 2020-01-16 PROCEDURE — 96361 HYDRATE IV INFUSION ADD-ON: CPT | Mod: HCNC

## 2020-01-16 PROCEDURE — 83735 ASSAY OF MAGNESIUM: CPT | Mod: HCNC

## 2020-01-16 PROCEDURE — 99285 EMERGENCY DEPT VISIT HI MDM: CPT | Mod: 25,HCNC

## 2020-01-16 PROCEDURE — 87040 BLOOD CULTURE FOR BACTERIA: CPT | Mod: 59,HCNC

## 2020-01-16 PROCEDURE — 84484 ASSAY OF TROPONIN QUANT: CPT | Mod: HCNC

## 2020-01-16 PROCEDURE — 93005 ELECTROCARDIOGRAM TRACING: CPT | Mod: HCNC

## 2020-01-16 PROCEDURE — 93010 EKG 12-LEAD: ICD-10-PCS | Mod: HCNC,,, | Performed by: INTERNAL MEDICINE

## 2020-01-16 PROCEDURE — 87186 SC STD MICRODIL/AGAR DIL: CPT | Mod: HCNC

## 2020-01-16 PROCEDURE — 85025 COMPLETE CBC W/AUTO DIFF WBC: CPT | Mod: HCNC

## 2020-01-16 PROCEDURE — 20600001 HC STEP DOWN PRIVATE ROOM: Mod: HCNC

## 2020-01-16 PROCEDURE — 87502 INFLUENZA DNA AMP PROBE: CPT | Mod: HCNC

## 2020-01-16 PROCEDURE — 84100 ASSAY OF PHOSPHORUS: CPT | Mod: HCNC

## 2020-01-16 PROCEDURE — 25000003 PHARM REV CODE 250: Mod: HCNC | Performed by: PHYSICIAN ASSISTANT

## 2020-01-16 PROCEDURE — 81001 URINALYSIS AUTO W/SCOPE: CPT | Mod: HCNC

## 2020-01-16 PROCEDURE — 87077 CULTURE AEROBIC IDENTIFY: CPT | Mod: HCNC

## 2020-01-16 PROCEDURE — 96365 THER/PROPH/DIAG IV INF INIT: CPT | Mod: HCNC

## 2020-01-16 PROCEDURE — 83605 ASSAY OF LACTIC ACID: CPT | Mod: HCNC

## 2020-01-16 PROCEDURE — 93010 ELECTROCARDIOGRAM REPORT: CPT | Mod: HCNC,,, | Performed by: INTERNAL MEDICINE

## 2020-01-16 PROCEDURE — 99285 PR EMERGENCY DEPT VISIT,LEVEL V: ICD-10-PCS | Mod: ,,, | Performed by: PHYSICIAN ASSISTANT

## 2020-01-16 PROCEDURE — 80053 COMPREHEN METABOLIC PANEL: CPT | Mod: HCNC

## 2020-01-16 PROCEDURE — 63600175 PHARM REV CODE 636 W HCPCS: Mod: HCNC | Performed by: EMERGENCY MEDICINE

## 2020-01-16 PROCEDURE — 63600175 PHARM REV CODE 636 W HCPCS: Mod: HCNC | Performed by: PHYSICIAN ASSISTANT

## 2020-01-16 RX ORDER — SODIUM CHLORIDE 0.9 % (FLUSH) 0.9 %
10 SYRINGE (ML) INJECTION
Status: DISCONTINUED | OUTPATIENT
Start: 2020-01-17 | End: 2020-01-21 | Stop reason: HOSPADM

## 2020-01-16 RX ORDER — LEVETIRACETAM 500 MG/1
500 TABLET ORAL 2 TIMES DAILY
Status: DISCONTINUED | OUTPATIENT
Start: 2020-01-16 | End: 2020-01-21 | Stop reason: HOSPADM

## 2020-01-16 RX ORDER — IBUPROFEN 200 MG
24 TABLET ORAL
Status: DISCONTINUED | OUTPATIENT
Start: 2020-01-17 | End: 2020-01-21 | Stop reason: HOSPADM

## 2020-01-16 RX ORDER — AMITRIPTYLINE HYDROCHLORIDE 50 MG/1
50 TABLET, FILM COATED ORAL NIGHTLY
Status: DISCONTINUED | OUTPATIENT
Start: 2020-01-17 | End: 2020-01-21 | Stop reason: HOSPADM

## 2020-01-16 RX ORDER — SODIUM CHLORIDE 0.9 % (FLUSH) 0.9 %
10 SYRINGE (ML) INJECTION
Status: CANCELLED | OUTPATIENT
Start: 2020-01-16

## 2020-01-16 RX ORDER — LORAZEPAM 1 MG/1
1 TABLET ORAL NIGHTLY
Status: DISCONTINUED | OUTPATIENT
Start: 2020-01-16 | End: 2020-01-21 | Stop reason: HOSPADM

## 2020-01-16 RX ORDER — METOPROLOL SUCCINATE 50 MG/1
50 TABLET, EXTENDED RELEASE ORAL DAILY
Status: DISCONTINUED | OUTPATIENT
Start: 2020-01-17 | End: 2020-01-17

## 2020-01-16 RX ORDER — GLUCAGON 1 MG
1 KIT INJECTION
Status: DISCONTINUED | OUTPATIENT
Start: 2020-01-17 | End: 2020-01-21 | Stop reason: HOSPADM

## 2020-01-16 RX ORDER — ACETAMINOPHEN 500 MG
1000 TABLET ORAL
Status: COMPLETED | OUTPATIENT
Start: 2020-01-16 | End: 2020-01-16

## 2020-01-16 RX ORDER — ERLOTINIB HYDROCHLORIDE 150 MG/1
150 TABLET, FILM COATED ORAL DAILY
Status: DISCONTINUED | OUTPATIENT
Start: 2020-01-17 | End: 2020-01-17

## 2020-01-16 RX ORDER — LOSARTAN POTASSIUM 50 MG/1
50 TABLET ORAL 2 TIMES DAILY
Status: DISCONTINUED | OUTPATIENT
Start: 2020-01-17 | End: 2020-01-17

## 2020-01-16 RX ORDER — DRONABINOL 2.5 MG/1
5 CAPSULE ORAL
Status: DISCONTINUED | OUTPATIENT
Start: 2020-01-17 | End: 2020-01-21 | Stop reason: HOSPADM

## 2020-01-16 RX ORDER — IBUPROFEN 200 MG
16 TABLET ORAL
Status: DISCONTINUED | OUTPATIENT
Start: 2020-01-17 | End: 2020-01-21 | Stop reason: HOSPADM

## 2020-01-16 RX ADMIN — PIPERACILLIN AND TAZOBACTAM 4.5 G: 4; .5 INJECTION, POWDER, FOR SOLUTION INTRAVENOUS at 10:01

## 2020-01-16 RX ADMIN — ACETAMINOPHEN 1000 MG: 500 TABLET ORAL at 09:01

## 2020-01-16 RX ADMIN — SODIUM CHLORIDE 1911 ML: 0.9 INJECTION, SOLUTION INTRAVENOUS at 08:01

## 2020-01-17 ENCOUNTER — TELEPHONE (OUTPATIENT)
Dept: HEMATOLOGY/ONCOLOGY | Facility: CLINIC | Age: 75
End: 2020-01-17

## 2020-01-17 LAB
ACANTHOCYTES BLD QL SMEAR: PRESENT
ALBUMIN SERPL BCP-MCNC: 2 G/DL (ref 3.5–5.2)
ALP SERPL-CCNC: 222 U/L (ref 55–135)
ALT SERPL W/O P-5'-P-CCNC: 275 U/L (ref 10–44)
ANION GAP SERPL CALC-SCNC: 10 MMOL/L (ref 8–16)
ANISOCYTOSIS BLD QL SMEAR: SLIGHT
AST SERPL-CCNC: 634 U/L (ref 10–40)
BASOPHILS # BLD AUTO: 0.04 K/UL (ref 0–0.2)
BASOPHILS NFR BLD: 0.2 % (ref 0–1.9)
BILIRUB SERPL-MCNC: 2.2 MG/DL (ref 0.1–1)
BUN SERPL-MCNC: 24 MG/DL (ref 8–23)
BURR CELLS BLD QL SMEAR: ABNORMAL
CALCIUM SERPL-MCNC: 6.9 MG/DL (ref 8.7–10.5)
CHLORIDE SERPL-SCNC: 121 MMOL/L (ref 95–110)
CO2 SERPL-SCNC: 16 MMOL/L (ref 23–29)
CREAT SERPL-MCNC: 1.7 MG/DL (ref 0.5–1.4)
DIFFERENTIAL METHOD: ABNORMAL
EOSINOPHIL # BLD AUTO: 0 K/UL (ref 0–0.5)
EOSINOPHIL NFR BLD: 0 % (ref 0–8)
ERYTHROCYTE [DISTWIDTH] IN BLOOD BY AUTOMATED COUNT: 15.2 % (ref 11.5–14.5)
EST. GFR  (AFRICAN AMERICAN): 33.5 ML/MIN/1.73 M^2
EST. GFR  (NON AFRICAN AMERICAN): 29.1 ML/MIN/1.73 M^2
GGT SERPL-CCNC: 149 U/L (ref 8–55)
GLUCOSE SERPL-MCNC: 88 MG/DL (ref 70–110)
HAV IGM SERPL QL IA: NEGATIVE
HBV CORE IGM SERPL QL IA: NEGATIVE
HBV SURFACE AG SERPL QL IA: NEGATIVE
HCT VFR BLD AUTO: 25.8 % (ref 37–48.5)
HCV AB SERPL QL IA: NEGATIVE
HGB BLD-MCNC: 7.7 G/DL (ref 12–16)
IMM GRANULOCYTES # BLD AUTO: 0.56 K/UL (ref 0–0.04)
IMM GRANULOCYTES NFR BLD AUTO: 2.6 % (ref 0–0.5)
LACTATE SERPL-SCNC: 2.1 MMOL/L (ref 0.5–2.2)
LACTATE SERPL-SCNC: 3.5 MMOL/L (ref 0.5–2.2)
LYMPHOCYTES # BLD AUTO: 0.9 K/UL (ref 1–4.8)
LYMPHOCYTES NFR BLD: 4.1 % (ref 18–48)
MAGNESIUM SERPL-MCNC: 1.9 MG/DL (ref 1.6–2.6)
MCH RBC QN AUTO: 28 PG (ref 27–31)
MCHC RBC AUTO-ENTMCNC: 29.8 G/DL (ref 32–36)
MCV RBC AUTO: 94 FL (ref 82–98)
MONOCYTES # BLD AUTO: 1.7 K/UL (ref 0.3–1)
MONOCYTES NFR BLD: 8 % (ref 4–15)
NEUTROPHILS # BLD AUTO: 18.1 K/UL (ref 1.8–7.7)
NEUTROPHILS NFR BLD: 85.1 % (ref 38–73)
NRBC BLD-RTO: 0 /100 WBC
OVALOCYTES BLD QL SMEAR: ABNORMAL
PHOSPHATE SERPL-MCNC: 3.6 MG/DL (ref 2.7–4.5)
PLATELET # BLD AUTO: 179 K/UL (ref 150–350)
PLATELET BLD QL SMEAR: ABNORMAL
PMV BLD AUTO: 11.4 FL (ref 9.2–12.9)
POIKILOCYTOSIS BLD QL SMEAR: SLIGHT
POTASSIUM SERPL-SCNC: 3.4 MMOL/L (ref 3.5–5.1)
PROT SERPL-MCNC: 4.2 G/DL (ref 6–8.4)
RBC # BLD AUTO: 2.75 M/UL (ref 4–5.4)
SCHISTOCYTES BLD QL SMEAR: ABNORMAL
SCHISTOCYTES BLD QL SMEAR: PRESENT
SODIUM SERPL-SCNC: 147 MMOL/L (ref 136–145)
WBC # BLD AUTO: 21.24 K/UL (ref 3.9–12.7)

## 2020-01-17 PROCEDURE — 83735 ASSAY OF MAGNESIUM: CPT | Mod: HCNC

## 2020-01-17 PROCEDURE — 97530 THERAPEUTIC ACTIVITIES: CPT | Mod: HCNC

## 2020-01-17 PROCEDURE — 99223 PR INITIAL HOSPITAL CARE,LEVL III: ICD-10-PCS | Mod: HCNC,AI,GC, | Performed by: INTERNAL MEDICINE

## 2020-01-17 PROCEDURE — 36415 COLL VENOUS BLD VENIPUNCTURE: CPT | Mod: HCNC

## 2020-01-17 PROCEDURE — 97161 PT EVAL LOW COMPLEX 20 MIN: CPT | Mod: HCNC

## 2020-01-17 PROCEDURE — 80074 ACUTE HEPATITIS PANEL: CPT | Mod: HCNC

## 2020-01-17 PROCEDURE — 87186 SC STD MICRODIL/AGAR DIL: CPT | Mod: HCNC

## 2020-01-17 PROCEDURE — 80053 COMPREHEN METABOLIC PANEL: CPT | Mod: HCNC

## 2020-01-17 PROCEDURE — 99223 1ST HOSP IP/OBS HIGH 75: CPT | Mod: HCNC,AI,GC, | Performed by: INTERNAL MEDICINE

## 2020-01-17 PROCEDURE — 87077 CULTURE AEROBIC IDENTIFY: CPT | Mod: HCNC

## 2020-01-17 PROCEDURE — 87040 BLOOD CULTURE FOR BACTERIA: CPT | Mod: 59,HCNC

## 2020-01-17 PROCEDURE — 25000003 PHARM REV CODE 250: Mod: HCNC | Performed by: STUDENT IN AN ORGANIZED HEALTH CARE EDUCATION/TRAINING PROGRAM

## 2020-01-17 PROCEDURE — 83605 ASSAY OF LACTIC ACID: CPT | Mod: 91,HCNC

## 2020-01-17 PROCEDURE — 83605 ASSAY OF LACTIC ACID: CPT | Mod: HCNC

## 2020-01-17 PROCEDURE — 84100 ASSAY OF PHOSPHORUS: CPT | Mod: HCNC

## 2020-01-17 PROCEDURE — 63600175 PHARM REV CODE 636 W HCPCS: Mod: HCNC | Performed by: INTERNAL MEDICINE

## 2020-01-17 PROCEDURE — 25000003 PHARM REV CODE 250: Mod: HCNC | Performed by: INTERNAL MEDICINE

## 2020-01-17 PROCEDURE — 20600001 HC STEP DOWN PRIVATE ROOM: Mod: HCNC

## 2020-01-17 PROCEDURE — 63600175 PHARM REV CODE 636 W HCPCS: Mod: HCNC | Performed by: STUDENT IN AN ORGANIZED HEALTH CARE EDUCATION/TRAINING PROGRAM

## 2020-01-17 PROCEDURE — 85025 COMPLETE CBC W/AUTO DIFF WBC: CPT | Mod: HCNC

## 2020-01-17 PROCEDURE — 97165 OT EVAL LOW COMPLEX 30 MIN: CPT | Mod: HCNC

## 2020-01-17 PROCEDURE — 82977 ASSAY OF GGT: CPT | Mod: HCNC

## 2020-01-17 RX ORDER — SODIUM,POTASSIUM PHOSPHATES 280-250MG
2 POWDER IN PACKET (EA) ORAL ONCE
Status: COMPLETED | OUTPATIENT
Start: 2020-01-17 | End: 2020-01-17

## 2020-01-17 RX ORDER — ACETAMINOPHEN 325 MG/1
650 TABLET ORAL EVERY 6 HOURS PRN
Status: DISCONTINUED | OUTPATIENT
Start: 2020-01-17 | End: 2020-01-17

## 2020-01-17 RX ORDER — VANCOMYCIN 1.75 GRAM/500 ML IN 0.9 % SODIUM CHLORIDE INTRAVENOUS
1750 ONCE
Status: COMPLETED | OUTPATIENT
Start: 2020-01-17 | End: 2020-01-17

## 2020-01-17 RX ORDER — CALCIUM GLUCONATE 94 MG/ML
1 INJECTION, SOLUTION INTRAVENOUS ONCE
Status: DISCONTINUED | OUTPATIENT
Start: 2020-01-17 | End: 2020-01-17

## 2020-01-17 RX ORDER — MAGNESIUM SULFATE HEPTAHYDRATE 40 MG/ML
2 INJECTION, SOLUTION INTRAVENOUS ONCE
Status: COMPLETED | OUTPATIENT
Start: 2020-01-17 | End: 2020-01-17

## 2020-01-17 RX ORDER — POLYETHYLENE GLYCOL 3350 17 G/17G
17 POWDER, FOR SOLUTION ORAL DAILY
Status: DISCONTINUED | OUTPATIENT
Start: 2020-01-17 | End: 2020-01-21 | Stop reason: HOSPADM

## 2020-01-17 RX ORDER — POTASSIUM CHLORIDE 20 MEQ/1
40 TABLET, EXTENDED RELEASE ORAL ONCE
Status: COMPLETED | OUTPATIENT
Start: 2020-01-17 | End: 2020-01-17

## 2020-01-17 RX ORDER — PROCHLORPERAZINE EDISYLATE 5 MG/ML
5 INJECTION INTRAMUSCULAR; INTRAVENOUS EVERY 6 HOURS PRN
Status: DISCONTINUED | OUTPATIENT
Start: 2020-01-17 | End: 2020-01-21 | Stop reason: HOSPADM

## 2020-01-17 RX ORDER — ONDANSETRON 8 MG/1
8 TABLET, ORALLY DISINTEGRATING ORAL EVERY 8 HOURS PRN
Status: DISCONTINUED | OUTPATIENT
Start: 2020-01-17 | End: 2020-01-21 | Stop reason: HOSPADM

## 2020-01-17 RX ADMIN — APIXABAN 5 MG: 5 TABLET, FILM COATED ORAL at 09:01

## 2020-01-17 RX ADMIN — POTASSIUM CHLORIDE 40 MEQ: 1500 TABLET, EXTENDED RELEASE ORAL at 02:01

## 2020-01-17 RX ADMIN — PANCRELIPASE 1 CAPSULE: 60000; 12000; 38000 CAPSULE, DELAYED RELEASE PELLETS ORAL at 05:01

## 2020-01-17 RX ADMIN — DRONABINOL 5 MG: 2.5 CAPSULE ORAL at 06:01

## 2020-01-17 RX ADMIN — PANCRELIPASE 1 CAPSULE: 60000; 12000; 38000 CAPSULE, DELAYED RELEASE PELLETS ORAL at 10:01

## 2020-01-17 RX ADMIN — LEVETIRACETAM 500 MG: 500 TABLET ORAL at 12:01

## 2020-01-17 RX ADMIN — SODIUM CHLORIDE, SODIUM LACTATE, POTASSIUM CHLORIDE, AND CALCIUM CHLORIDE 500 ML: .6; .31; .03; .02 INJECTION, SOLUTION INTRAVENOUS at 02:01

## 2020-01-17 RX ADMIN — APIXABAN 5 MG: 5 TABLET, FILM COATED ORAL at 10:01

## 2020-01-17 RX ADMIN — PIPERACILLIN SODIUM,TAZOBACTAM SODIUM 4.5 G: 4; .5 INJECTION, POWDER, FOR SOLUTION INTRAVENOUS at 06:01

## 2020-01-17 RX ADMIN — POLYETHYLENE GLYCOL 3350 17 G: 17 POWDER, FOR SOLUTION ORAL at 03:01

## 2020-01-17 RX ADMIN — PANCRELIPASE 1 CAPSULE: 60000; 12000; 38000 CAPSULE, DELAYED RELEASE PELLETS ORAL at 03:01

## 2020-01-17 RX ADMIN — VANCOMYCIN HYDROCHLORIDE 1750 MG: 100 INJECTION, POWDER, LYOPHILIZED, FOR SOLUTION INTRAVENOUS at 12:01

## 2020-01-17 RX ADMIN — LEVETIRACETAM 500 MG: 500 TABLET ORAL at 10:01

## 2020-01-17 RX ADMIN — LEVETIRACETAM 500 MG: 500 TABLET ORAL at 09:01

## 2020-01-17 RX ADMIN — POTASSIUM CHLORIDE 40 MEQ: 1500 TABLET, EXTENDED RELEASE ORAL at 09:01

## 2020-01-17 RX ADMIN — DRONABINOL 5 MG: 2.5 CAPSULE ORAL at 03:01

## 2020-01-17 RX ADMIN — CALCIUM GLUCONATE 1000 MG: 98 INJECTION, SOLUTION INTRAVENOUS at 10:01

## 2020-01-17 RX ADMIN — POTASSIUM & SODIUM PHOSPHATES POWDER PACK 280-160-250 MG 2 PACKET: 280-160-250 PACK at 02:01

## 2020-01-17 RX ADMIN — LORAZEPAM 1 MG: 1 TABLET ORAL at 12:01

## 2020-01-17 RX ADMIN — PIPERACILLIN SODIUM,TAZOBACTAM SODIUM 4.5 G: 4; .5 INJECTION, POWDER, FOR SOLUTION INTRAVENOUS at 01:01

## 2020-01-17 RX ADMIN — MAGNESIUM SULFATE IN WATER 2 G: 40 INJECTION, SOLUTION INTRAVENOUS at 02:01

## 2020-01-17 RX ADMIN — LORAZEPAM 1 MG: 1 TABLET ORAL at 09:01

## 2020-01-17 RX ADMIN — AMITRIPTYLINE HYDROCHLORIDE 50 MG: 50 TABLET, FILM COATED ORAL at 09:01

## 2020-01-17 NOTE — HPI
"Ms. St is a 74 year old woman with metastatic R lung adenocarcinoma on Xgeva and Tarceva, breast ca s/p L lumpectomy, meningioma s/p resection, pancreatic ca s/p whipple, and s/p hysterectomy w/ b/l salpingo-oophorectomy, HTN who presents with fevers. She follows with Dr. Vazquez in clinic. Patient says that this past Saturday, she felt as if the "room was spinning", and this stopped on its own. She has experienced this before. She says she has been feeling "hot" for the past week. Her family ( and son) at bedside say that she has been having chills, patient feels weak and that she has looked pale. Patient says that she has been having nausea, vomiting and dry cough. She also says she has been having a runny nose, headaches, and bilateral lower extremity swelling (that has been chronic). She denies diarrhea, dysuria, new rashes, muscle aches. She states that she had the flu shot this year. She denies changes in medications or new additions with the exception of Tarsiva being changed to the generic brand. She denies being around recent sick contacts or travel.     Patient is still receiving Xgeva (about once every 6 weeks, she believes last received around 12/12/19). She reports that Tagrisso was stopped.     ED course: Patient was febrile to 104.3F as well as tachycardic, and labs were notable for elevated LFTs. UA was not suggestive of UTI, and CXR did not show an acute process. He procal was elevated to 2.52. Flu was negative. Her initial lactic acid was WNL but the second one was elevated to 3.5. She received IVF, electrolyte replacement and a dose of zosyn. Patient states that she feels much better, and her family notes that she looks improved since initially presenting to the ED.    Oncologic History (per Dr. Vazquez's note 12/4/2019):  Ms. Naty St was admitted to the hospital between 01/25/2016 and 01/28/2016. She has a history of pancreatic cancer 17 years ago, breast cancer and meningioma, " "presented to the hospital complaining of loss of consciousness. The patient apparently was in her normal state of health and she stood up to go to the bathroom and fell to the floor. The patient's daughter helped the mother up in the bathroom and noted that her mother's upper extremities were shaking and eye rolling. No reports of bowel or bladder incontinence, tongue biting or rolling. The second episode lasted about four minutes and she was extremely lethargic following that. Apparently, the patient had a meningioma resection done in the past; however, recently, underwent neurosurgical resection with Dr. 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    Additional immunohistochemical stains were performed which revealed the tumor cells to be are positive for TTF1 and negative for ER and GCDFP. The morphology and TTF1 positivity are most consistent with lung primary.    Of note, imaging scan at the end of January 2016 revealed a mass in the lung at 2 cm in the medial aspect of the apical segment of the right upper lobe abutting the mediastinum at the level of the azygous vein and abutting and possibly encasing the segmental bronchi and vessels of the apical segment of the right upper lobe and no pleural fluid was present. Also, there is an enlarged right paratracheal lymph node. No evidence of any metastatic disease at the pancreatic site with postoperative changes post Whipple disease  Her PET Scan from 2/15/16 reveal "Hypermetabolic mass in the right lung apex consistent with a primary malignancy. Hypermetabolic mediastinal lymph nodes consistent with metastatic disease. Right sacral hypermetabolic lesion consistent with metastatic disease, noting additional mildly sclerotic lesions in multiple vertebral bodies which do not demonstrate abnormal hypermetabolism  She underwent IR bone biopsy which revealed " "metastatic adenocarcinoma of lung origin. 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  She has completed focal RT to brain lesions in March 2016.    PET scan from 6/6/16 shows "Dramatic almost complete response to therapy."  Lovenox started after US lower ext 6/7/16 shows Acute complete occlusion of one of the left posterior tibial vein.Remote partial thrombus of the proximal left superficial femoral vein.  She is on Tarceva.   12/6/16 PET scan reveals "Stable right upper lobe lesion and right hilar lymph node. No new lesions identified. Trace left pleural effusion".  1/27/17 Renal u/s "Medical renal disease. Nonobstructive right nephrolithiasis"  1/31/17 PET - "Right upper lobe nodule and right hilar lymph node similar and very low grade activity.  There is no definite evidence of recurrence."   11/9/17 PET "In this patient with history of lung cancer, there is interval increase in size and hypermetabolism of a right upper lobe lung lesion concerning for recurrent disease. Additionally, there is interval appearance of a hypermetabolic paratracheal lymph node suspicious for metastatic disease"     She progressed on Tarceva She underwent EBUS on 12/5/17. Pathology revealed adenocarcinoma but T790m could not be done as quantity was not insufficient. Her PET scan from 1/30/18 revealed The patient's previously identified abnormal lesions is slightly greater uptake of FDG on today's study compared with prior exam. These findings are concerning for progression of disease"     She was hospitalized between 4/9/18-4/16/18. Her hospital course was as follows "Patient was admitted to [facility] service on 4/9 with acute hypoxic respiratory failure. She was requiring 4 L of nc on admission. She was started on moxi for CAP. She received one dose of ceftriaxone in the ED. On 2nd day of admission she spiked a fever. Her Hb was ~7 g/dl so she was transfused 1 unit of PRBCs. Over night she developed worsening of her symptoms with " "increased O2 requirements to 15 L HFNC. Her CXR showed worsening congestion. There was a concern of TRALI vs TACO. New 2D echo with diastolic dysfunction. She was given IV lasix pushes as needed and was started on dexamethasone.  Her abx was escalated to vanc and cefepime. She improved with diuresis and steroids. Pulmonary were consulted. Her O2 requirements continued to improve. On 4/15 she was switched to Augmentin. PT recommended discharging her to SNF but the patient refused and she wanted to go home with home health. She qualified for home oxygen so she was discharged on 3 L nc while at rest and 6 while active. Augmentin and dexamethasone were discontinued on discharge"     She was readmitted from 4/27 to 5/3/18 with who presented to the ED with a complaint of fatigue and worsening DELA CRUZ. Pt diagnosed PNA 4/9 and was discharged on 4/16 on 3L oxygen. She was initially placed on cefepime for 3 days followed by an add'l 2 days of Augmentin. Pulm was consulted with assistance as her oxygen requirements were increasing. She was placed on oral steroids, then trailed on 80mg TID solumedrol for 2 days and will be discharged on a prednisone taper. She was also diuresed with 2 days of 40mg IV lasix with appropriate UOP resulting, improvement on repeat CXR. She was medically stable and appropriate for DC home with home health on 1L continuous O2. She will be seen for close f/u and may be able to come off oxygen at that time.      She discontinued Tagrisso in April 2018. Restaging scans from 6/4/18 revealed "Stable appearance of a spiculated right upper lobe pulmonary lesion.  Additional irregular focus superior to the lesion in the right lung apex is stable and may represent scar or additional lesion; although this was not hypermetabolic on prior PET-CT.  No new pulmonary lesions. 1.3 cm subcarinal lymph node, not hypermetabolic on prior PET-CT. Stable postsurgical changes of a Whipple procedure. Bilateral nonobstructing " "nephrolithiasis.  Stable sclerotic focus in the T5 vertebral body, possibly a bone island as this was not hypermetabolic on prior PET-CT. Small hiatal hernia. RECIST SUMMARY: Date of prior examination for comparison 01/30/2018 Lesion 1: Right upper lobe 1.3 cm Series 2 image 31 prior measurement 1.3 cm". Bone scan also from 6/4/18 revealed no evidence of mets.     Her PET scan from 7/11/18 revealed "Right suprahilar lesion SUV max 3.76, previously 6.86. Pretracheal lymph node SUV max 2.55, previously 3.79. There is physiologic intracranial, head, and neck activity.  There is muscle activation.  There is vocal cord activation.  There is physiologic liver, spleen, GI and  activity.  There is a hiatal hernia.  Pelvic organs show nothing unusual.  No bone lesions are seen.  There are areas of benign appearing lung scar"  She notes fatigue.  She denies any nausea, vomiting, diarrhea, constipation, abdominal pain, weight loss or loss of appetite, chest pain, shortness of breath, leg swelling, fatigue, pain, headache, dizziness, or mood changes. Her ECOG PS is 2. She is accompanied by her  and son     She has been on Tarceva since 6/5/18. She has now completed SBRT to her right lung lesions.      HPI She comes in to review her MRI brain from 11/26/19 which reveals "Stable postsurgical changes of left frontal lobe mass resection.  No evidence of residual or recurrent malignancy. Right cerebellar encephalomalacia consistent with prior infarct. Mucosal thickening and layering fluid within the paranasal sinuses which may reflect acute sinusitis"  PET scan 11/26/19 reveals "New hypermetabolic pretracheal lymph node concerning for recurrent metastatic lung cancer. Two new small ground-glass lesions in the right upper lobe without corresponding increased radiotracer uptake, although likely too small to characterize with PET-CT.  Recommend attention on follow-up. Focal non physiologic uptake in the right hemicolon. " "Findings are nonspecific but could represent underlying polyp. Further evaluation could be performed with colonoscopy if clinically warranted"  She noted cough and some bronchitis in October 2019 which has since resolved. Notes fatigue.  "

## 2020-01-17 NOTE — ASSESSMENT & PLAN NOTE
Patient is a 74 year old woman with metastatic lung adenocarcinoma who presents with fevers. She was febrile up to 104.3F in the ED and tachycardic upon admission. Otherwise, other vitals were stable. She had a normal WBC.     DDx broadly at this time include bacteremia, cholecystitis, influenza, UTI, PNA, meningitis.     Plan:  - Continue broad spectrum antibiotics vancomycin and zosyn  - Patient received 30mg/kg IVF in ED. First lactic acid WNL but second one elevated to 3.5. Will give additional bolus and repeat lactic acid   - CXR showed no acute process, UA not suggestive of UTI. Flu was negative. Follow up blood cultures  - Abdominal US, hepatitis panel ordered as mentioned in elevated LFTs

## 2020-01-17 NOTE — ED PROVIDER NOTES
"Encounter Date: 1/16/2020       History     Chief Complaint   Patient presents with    Fatigue     Pt hx of cancer, febrile, tachypenic. C/o malaise     74 year old female with medical history of malignant RLL neoplasm, brain and bone metatasis, HTN presenting to the ED with the chief complaint of fever. Patient reports feeling "hot" for the past 5 days. She has not taken her temperature at home. She reports over the past few days she has developed generalized weakness and fatigue which prompted EMS being called tonight. She reports associated dry cough and urinary frequency. She is on PO chemotherapy Tagrisso daily and had Xgeva immunotherapy infusion last month. She has not had IV chemotherapy in several months. She received the influenza vaccine this year. No history of CHF or diuretic use.         Review of patient's allergies indicates:   Allergen Reactions    Tobradex [tobramycin-dexamethasone] Swelling    Iodinated contrast media Hives    Latex Rash and Hives    Phenytoin sodium extended Other (See Comments) and Rash     Past Medical History:   Diagnosis Date    Anticoagulant long-term use     Blood clot in vein 06/2016    Breast cancer 1994    Cataract     Hypertension     Lung cancer     Lung cancer metastatic to brain     Pancreatic cancer 1998    Posterior capsular opacification, left eye     Psychiatric problem     Seizures 01/25/2016    Stroke     Therapy      Past Surgical History:   Procedure Laterality Date    BONE BIOSPY  3/9/16    BRAIN SURGERY  1/12/16    tumor removal 1/12/16    BREAST LUMPECTOMY  1998    left    CATARACT EXTRACTION Bilateral 2004    yag OU    CHOLECYSTECTOMY  1998    COLONOSCOPY N/A 11/13/2015    Procedure: COLONOSCOPY;  Surgeon: Alex Carmona MD;  Location: [facility] ([address] [place] [address]);  Service: Endoscopy;  Laterality: N/A;  2 year f/u    COLONOSCOPY N/A 11/7/2018    Procedure: COLONOSCOPY;  Surgeon: Jim Raman MD;  Location: [facility] ([address] [place] [address]);  " Service: Endoscopy;  Laterality: N/A;  Eliquis - per Dr. Vazquez -ok to hold Eliquis x 2 days prior to colon- ERW    cysto and right ureteral stent  12    [facility]      removal of blockage, done throat, then through the liver.    HYSTERECTOMY  1974    KIDNEY STONE SURGERY  --laser    left eswl  12    OOPHORECTOMY  2012    Laparoscopic BSO, lysis of adhesions, cystoscopy greater than 35mins     WHIPPLE PROCEDURE W/ LAPAROSCOPY       Family History   Problem Relation Age of Onset    Breast cancer Mother     Lung cancer Mother     Leukemia Paternal Grandfather     Stomach cancer Paternal Grandmother     Colon cancer Neg Hx     Ovarian cancer Neg Hx      Social History     Tobacco Use    Smoking status: Former Smoker     Packs/day: 0.00     Years: 0.00     Pack years: 0.00     Last attempt to quit: 1961     Years since quittin.3    Smokeless tobacco: Never Used   Substance Use Topics    Alcohol use: No    Drug use: No     Review of Systems   Constitutional: Positive for fatigue and fever.   HENT: Negative for congestion, sore throat and trouble swallowing.    Eyes: Negative for pain, redness and visual disturbance.   Respiratory: Positive for cough.    Cardiovascular: Negative for chest pain.   Gastrointestinal: Negative for abdominal pain, constipation, diarrhea, nausea and vomiting.   Genitourinary: Positive for frequency. Negative for dysuria and hematuria.   Musculoskeletal: Negative for back pain.   Skin: Negative for rash and wound.   Neurological: Positive for weakness. Negative for light-headedness and headaches.       Physical Exam     Initial Vitals [20]   BP Pulse Resp Temp SpO2   (!) 149/65 (!) 120 (!) 24 (!) 104.3 °F (40.2 °C) 97 %      MAP       --         Physical Exam    Constitutional: She appears well-developed and well-nourished. She is not diaphoretic. No distress.   HENT:   Head: Normocephalic.   Mouth/Throat: Oropharynx is clear and moist.  No oropharyngeal exudate.   Well healed scar overlying R calvarium   Eyes: EOM are normal. Pupils are equal, round, and reactive to light.   Neck: Normal range of motion. Neck supple.   Cardiovascular: Regular rhythm. Tachycardia present.    +1 pitting edema BLE   Pulmonary/Chest: Breath sounds normal. No respiratory distress. She has no wheezes. She has no rales.   Abdominal: Soft. She exhibits no distension. There is no tenderness.   No CVA tenderness   Musculoskeletal: Normal range of motion. She exhibits no edema or tenderness.   Neurological: She is alert and oriented to person, place, and time. She has normal strength. No cranial nerve deficit or sensory deficit.   Skin: Skin is warm and dry. No rash noted. No erythema.       ED Course   Procedures  Labs Reviewed   CBC W/ AUTO DIFFERENTIAL - Abnormal; Notable for the following components:       Result Value    RBC 3.18 (*)     Hemoglobin 8.9 (*)     Hematocrit 29.7 (*)     Mean Corpuscular Hemoglobin Conc 30.0 (*)     RDW 14.8 (*)     Lymph # 0.3 (*)     Mono # 0.2 (*)     Gran% 89.6 (*)     Lymph% 5.6 (*)     All other components within normal limits   COMPREHENSIVE METABOLIC PANEL - Abnormal; Notable for the following components:    Potassium 3.1 (*)     Chloride 118 (*)     CO2 15 (*)     Creatinine 1.5 (*)     Calcium 7.3 (*)     Total Protein 4.9 (*)     Albumin 2.4 (*)     Total Bilirubin 2.0 (*)     Alkaline Phosphatase 321 (*)     AST 1,300 (*)      (*)     eGFR if  39.0 (*)     eGFR if non  33.8 (*)     All other components within normal limits   MAGNESIUM - Abnormal; Notable for the following components:    Magnesium 1.4 (*)     All other components within normal limits   PHOSPHORUS - Abnormal; Notable for the following components:    Phosphorus 1.7 (*)     All other components within normal limits   PROCALCITONIN - Abnormal; Notable for the following components:    Procalcitonin 2.52 (*)     All other  components within normal limits   TROPONIN I - Abnormal; Notable for the following components:    Troponin I 0.041 (*)     All other components within normal limits   INFLUENZA A & B BY MOLECULAR   CULTURE, BLOOD   CULTURE, BLOOD   LACTIC ACID, PLASMA   URINALYSIS, REFLEX TO URINE CULTURE    Narrative:     Preferred Collection Type->Urine, Clean Catch   URINALYSIS MICROSCOPIC    Narrative:     Preferred Collection Type->Urine, Clean Catch   LACTIC ACID, PLASMA   POCT INFLUENZA A/B MOLECULAR          Imaging Results          X-Ray Chest AP Portable (Final result)  Result time 01/16/20 22:17:13    Final result by Remi Valiente MD (01/16/20 22:17:13)                 Impression:      No acute findings in the chest.      Electronically signed by: Rmei Valiente MD  Date:    01/16/2020  Time:    22:17             Narrative:    EXAMINATION:  XR CHEST AP PORTABLE    CLINICAL HISTORY:  Sepsis;    TECHNIQUE:  Single frontal view of the chest was performed.    COMPARISON:  October 17, 2018.    FINDINGS:  Surgical clips left axilla, similar to prior.    No consolidation, pleural effusion or pneumothorax.    Cardiomediastinal silhouette is unchanged.                                 Medical Decision Making:   History:   Old Medical Records: I decided to obtain old medical records.  Old Records Summarized: records from clinic visits.  Independently Interpreted Test(s):   I have ordered and independently interpreted EKG Reading(s) - see summary below       <> Summary of EKG Reading(s): Sinus tachycardia 101 bpm. No STEMI  Clinical Tests:   Lab Tests: Ordered and Reviewed  Radiological Study: Ordered and Reviewed  Medical Tests: Ordered and Reviewed  Sepsis Perfusion Assessment: I attest, a sepsis perfusion exam was performed within 6 hours of Septic Shock presentation, following fluid resuscitation.  Other:   I have discussed this case with another health care provider.       <> Summary of the Discussion: Hem-Onc       APC /  Resident Notes:   74 year old female with medical history of malignant RLL neoplasm, brain and bone metatasis, HTN presenting to the ED c/o 5 days of fever, generalized weakness, fatigue. Reports associated dry cough and urinary frequency. DDx includes but not limited to sepsis, bacteremia, neutropenic fever, UTI, pneumonia, influenza, viral syndrome. Will obtain sepsis work-up. 30cc/kg fluids and Tylenol ordered.    Source of fever unclear. Work-up shows elevated procalcitonin 2.5. CBC stable, UA negative for UTI, Influenza negative, CXR without evidence of consolidations or infiltrates. Lactate 1.6. CMP shows newly elevated liver enzymes (Tbil 2, Alk phos 321, AST 1300, ). No abdominal tenderness and she has history of cholecystectomy. Do not feel emergent abdominal imaging needed at this time. Zosyn IV given in the ED for broad coverage. Discussed patient with hem-onc and they will admit to their service. Patient expresses understanding and agreeable to the plan. I have discussed the care of this patient with my supervising physician.                             Clinical Impression:       ICD-10-CM ICD-9-CM   1. Sepsis, due to unspecified organism, unspecified whether acute organ dysfunction present A41.9 038.9     995.91   2. Tachycardia R00.0 785.0   3. Febrile illness, acute R50.9 780.60   4. Malignant neoplasm of right lung, unspecified part of lung C34.91 162.9   5. Transaminitis R74.0 790.4         Disposition:   Disposition: Discharged  Condition: Stable                     Justice Win PA-C  01/17/20 0014

## 2020-01-17 NOTE — PLAN OF CARE
Problem: Occupational Therapy Goal  Goal: Occupational Therapy Goal  Description  Pt is not currently displaying a need for acute OT services. D/C acute OT services and recommend pt D/C home.   Outcome: Met Jackson Moss OTR/L  1/17/2020

## 2020-01-17 NOTE — ASSESSMENT & PLAN NOTE
Patient is a 74 year old woman with metastatic lung adenocarcinoma on Xgeva and Tarceva who follows with Dr. Vazquez in clinic.  - Per pharmacy, only staff can order Tarceva  - Will need outpatient follow up with Dr. Vazquez upon discharge

## 2020-01-17 NOTE — ASSESSMENT & PLAN NOTE
Patient's LFTs on admission were elevated (AST 1300,  from prior a month ago AST 39, ). She reports no new medication additions or changes. Likely related to sepsis vs possible liver mets    Plan:  - Follow up abdominal ultrasound, hepatitis panel  - Hold home atorvastatin for now  - Trend daily CMP

## 2020-01-17 NOTE — H&P
"[facility]  Hematology/Oncology  H&P    Patient Name: Naty St  MRN: [id number]  Admission Date: 1/16/2020  Code Status: Prior   Attending Provider: Yahaira Najera MD  Primary Care Physician: Olvin Mars MD  Principal Problem:Sepsis    Subjective:     HPI: Ms. St is a 74 year old woman with metastatic R lung adenocarcinoma on Xgeva and Tarceva, breast ca s/p L lumpectomy, meningioma s/p resection, pancreatic ca s/p whipple, and s/p hysterectomy w/ b/l salpingo-oophorectomy, HTN who presents with fevers. She follows with Dr. Vazquez in clinic. Patient says that this past Saturday, she felt as if the "room was spinning", and this stopped on its own. She has experienced this before. She says she has been feeling "hot" for the past week. Her family ( and son) at bedside say that she has been having chills, patient feels weak and that she has looked pale. Patient says that she has been having nausea, vomiting and dry cough. She also says she has been having a runny nose, headaches, and bilateral lower extremity swelling (that has been chronic). She denies diarrhea, dysuria, new rashes, muscle aches. She states that she had the flu shot this year. She denies changes in medications or new additions with the exception of Tarsiva being changed to the generic brand. She denies being around recent sick contacts or travel.     Patient is still receiving Xgeva (about once every 6 weeks, she believes last received around 12/12/19). She reports that Tagrisso was stopped.     ED course: Patient was febrile to 104.3F as well as tachycardic, and labs were notable for elevated LFTs. UA was not suggestive of UTI, and CXR did not show an acute process. He procal was elevated to 2.52. Flu was negative. Her initial lactic acid was WNL but the second one was elevated to 3.5. She received IVF, electrolyte replacement and a dose of zosyn. Patient states that she feels much better, and her family " "notes that she looks improved since initially presenting to the ED.    Oncologic History (per Dr. Vazquez's note 12/4/2019):  Ms. Naty St was admitted to the hospital between 01/25/2016 and 01/28/2016. She has a history of pancreatic cancer 17 years ago, breast cancer and meningioma, presented to the hospital complaining of loss of consciousness. The patient apparently was in her normal state of health and she stood up to go to the bathroom and fell to the floor. The patient's daughter helped the mother up in the bathroom and noted that her mother's upper extremities were shaking and eye rolling. No reports of bowel or bladder incontinence, tongue biting or rolling. The second episode lasted about four minutes and she was extremely lethargic following that. Apparently, the patient had a meningioma resection done in the past; however, recently, underwent neurosurgical resection with Dr. 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    Additional immunohistochemical stains were performed which revealed the tumor cells to be are positive for TTF1 and negative for ER and GCDFP. The morphology and TTF1 positivity are most consistent with lung primary.    Of note, imaging scan at the end of January 2016 revealed a mass in the lung at 2 cm in the medial aspect of the apical segment of the right upper lobe abutting the mediastinum at the level of the azygous vein and abutting and possibly encasing the segmental bronchi and vessels of the apical segment of the right upper lobe and no pleural fluid was present. Also, there is an enlarged right paratracheal lymph node. No evidence of any metastatic disease at the pancreatic site with postoperative changes post Whipple disease  Her PET Scan from 2/15/16 reveal "Hypermetabolic mass in the right lung apex consistent with a primary malignancy. Hypermetabolic " "mediastinal lymph nodes consistent with metastatic disease. Right sacral hypermetabolic lesion consistent with metastatic disease, noting additional mildly sclerotic lesions in multiple vertebral bodies which do not demonstrate abnormal hypermetabolism  She underwent IR bone biopsy which revealed metastatic adenocarcinoma of lung origin. 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  She has completed focal RT to brain lesions in March 2016.    PET scan from 6/6/16 shows "Dramatic almost complete response to therapy."  Lovenox started after US lower ext 6/7/16 shows Acute complete occlusion of one of the left posterior tibial vein.Remote partial thrombus of the proximal left superficial femoral vein.  She is on Tarceva.   12/6/16 PET scan reveals "Stable right upper lobe lesion and right hilar lymph node. No new lesions identified. Trace left pleural effusion".  1/27/17 Renal u/s "Medical renal disease. Nonobstructive right nephrolithiasis"  1/31/17 PET - "Right upper lobe nodule and right hilar lymph node similar and very low grade activity.  There is no definite evidence of recurrence."   11/9/17 PET "In this patient with history of lung cancer, there is interval increase in size and hypermetabolism of a right upper lobe lung lesion concerning for recurrent disease. Additionally, there is interval appearance of a hypermetabolic paratracheal lymph node suspicious for metastatic disease"     She progressed on Tarceva She underwent EBUS on 12/5/17. Pathology revealed adenocarcinoma but T790m could not be done as quantity was not insufficient. Her PET scan from 1/30/18 revealed The patient's previously identified abnormal lesions is slightly greater uptake of FDG on today's study compared with prior exam. These findings are concerning for progression of disease"     She was hospitalized between 4/9/18-4/16/18. Her hospital course was as follows "Patient was admitted to hemonc service on 4/9 with acute hypoxic respiratory " "failure. She was requiring 4 L of nc on admission. She was started on moxi for CAP. She received one dose of ceftriaxone in the ED. On 2nd day of admission she spiked a fever. Her Hb was ~7 g/dl so she was transfused 1 unit of PRBCs. Over night she developed worsening of her symptoms with increased O2 requirements to 15 L HFNC. Her CXR showed worsening congestion. There was a concern of TRALI vs TACO. New 2D echo with diastolic dysfunction. She was given IV lasix pushes as needed and was started on dexamethasone.  Her abx was escalated to vanc and cefepime. She improved with diuresis and steroids. Pulmonary were consulted. Her O2 requirements continued to improve. On 4/15 she was switched to Augmentin. PT recommended discharging her to SNF but the patient refused and she wanted to go home with home health. She qualified for home oxygen so she was discharged on 3 L nc while at rest and 6 while active. Augmentin and dexamethasone were discontinued on discharge"     She was readmitted from 4/27 to 5/3/18 with who presented to the ED with a complaint of fatigue and worsening DELA CRUZ. Pt diagnosed PNA 4/9 and was discharged on 4/16 on 3L oxygen. She was initially placed on cefepime for 3 days followed by an add'l 2 days of Augmentin. Pulm was consulted with assistance as her oxygen requirements were increasing. She was placed on oral steroids, then trailed on 80mg TID solumedrol for 2 days and will be discharged on a prednisone taper. She was also diuresed with 2 days of 40mg IV lasix with appropriate UOP resulting, improvement on repeat CXR. She was medically stable and appropriate for DC home with home health on 1L continuous O2. She will be seen for close f/u and may be able to come off oxygen at that time.      She discontinued Tagrisso in April 2018. Restaging scans from 6/4/18 revealed "Stable appearance of a spiculated right upper lobe pulmonary lesion.  Additional irregular focus superior to the lesion in the right " "lung apex is stable and may represent scar or additional lesion; although this was not hypermetabolic on prior PET-CT.  No new pulmonary lesions. 1.3 cm subcarinal lymph node, not hypermetabolic on prior PET-CT. Stable postsurgical changes of a Whipple procedure. Bilateral nonobstructing nephrolithiasis.  Stable sclerotic focus in the T5 vertebral body, possibly a bone island as this was not hypermetabolic on prior PET-CT. Small hiatal hernia. RECIST SUMMARY: Date of prior examination for comparison 01/30/2018 Lesion 1: Right upper lobe 1.3 cm Series 2 image 31 prior measurement 1.3 cm". Bone scan also from 6/4/18 revealed no evidence of mets.     Her PET scan from 7/11/18 revealed "Right suprahilar lesion SUV max 3.76, previously 6.86. Pretracheal lymph node SUV max 2.55, previously 3.79. There is physiologic intracranial, head, and neck activity.  There is muscle activation.  There is vocal cord activation.  There is physiologic liver, spleen, GI and  activity.  There is a hiatal hernia.  Pelvic organs show nothing unusual.  No bone lesions are seen.  There are areas of benign appearing lung scar"  She notes fatigue.  She denies any nausea, vomiting, diarrhea, constipation, abdominal pain, weight loss or loss of appetite, chest pain, shortness of breath, leg swelling, fatigue, pain, headache, dizziness, or mood changes. Her ECOG PS is 2. She is accompanied by her  and son     She has been on Tarceva since 6/5/18. She has now completed SBRT to her right lung lesions.      HPI She comes in to review her MRI brain from 11/26/19 which reveals "Stable postsurgical changes of left frontal lobe mass resection.  No evidence of residual or recurrent malignancy. Right cerebellar encephalomalacia consistent with prior infarct. Mucosal thickening and layering fluid within the paranasal sinuses which may reflect acute sinusitis"  PET scan 11/26/19 reveals "New hypermetabolic pretracheal lymph node concerning for " "recurrent metastatic lung cancer. Two new small ground-glass lesions in the right upper lobe without corresponding increased radiotracer uptake, although likely too small to characterize with PET-CT.  Recommend attention on follow-up. Focal non physiologic uptake in the right hemicolon. Findings are nonspecific but could represent underlying polyp. Further evaluation could be performed with colonoscopy if clinically warranted"  She noted cough and some bronchitis in October 2019 which has since resolved. Notes fatigue.     Oncology Treatment Plan:   [No treatment plan]    Medications:  Continuous Infusions:  Scheduled Meds:   amitriptyline  50 mg Oral QHS    apixaban  5 mg Oral BID    dronabinol  5 mg Oral BID AC    lactated ringers  500 mL Intravenous Once    levETIRAcetam  500 mg Oral BID    lipase-protease-amylase 12,000-38,000-60,000 units  1 capsule Oral TID WM    LORazepam  1 mg Oral QHS    losartan  50 mg Oral BID    metoprolol succinate  50 mg Oral Daily    piperacillin-tazobactam (ZOSYN) IVPB  4.5 g Intravenous Q8H    vancomycin (VANCOCIN) IVPB  1,750 mg Intravenous Once    [START ON 1/18/2020] vancomycin (VANCOCIN) IVPB  750 mg Intravenous Q24H     PRN Meds:Dextrose 10% Bolus, Dextrose 10% Bolus, glucagon (human recombinant), glucose, glucose, sodium chloride 0.9%     Review of patient's allergies indicates:   Allergen Reactions    Tobradex [tobramycin-dexamethasone] Swelling    Iodinated contrast media Hives    Latex Rash and Hives    Phenytoin sodium extended Other (See Comments) and Rash        Past Medical History:   Diagnosis Date    Anticoagulant long-term use     Blood clot in vein 06/2016    Breast cancer 1994    Cataract     Hypertension     Lung cancer     Lung cancer metastatic to brain     Pancreatic cancer 1998    Posterior capsular opacification, left eye     Psychiatric problem     Seizures 01/25/2016    Stroke     Therapy      Past Surgical History:   Procedure " Laterality Date    BONE BIOSPY  3/9/16    BRAIN SURGERY  16    tumor removal 16    BREAST LUMPECTOMY  1998    left    CATARACT EXTRACTION Bilateral 2004    yag OU    CHOLECYSTECTOMY      COLONOSCOPY N/A 2015    Procedure: COLONOSCOPY;  Surgeon: Alex Carmona MD;  [facility]  Service: Endoscopy;  Laterality: N/A;  2 year f/u    COLONOSCOPY N/A 2018    Procedure: COLONOSCOPY;  Surgeon: Jim Raman MD;  [facility]  Service: Endoscopy;  Laterality: N/A;  Eliquis - per Dr. George santiago to hold Eliquis x 2 days prior to colon- ERW    cysto and right ureteral stent  12    ecoli      removal of blockage, done throat, then through the liver.    HYSTERECTOMY  1974    KIDNEY STONE SURGERY  --laser    left eswl  12    OOPHORECTOMY  2012    Laparoscopic BSO, lysis of adhesions, cystoscopy greater than 35mins     WHIPPLE PROCEDURE W/ LAPAROSCOPY       Family History     Problem Relation (Age of Onset)    Breast cancer Mother    Leukemia Paternal Grandfather    Lung cancer Mother    Stomach cancer Paternal Grandmother        Tobacco Use    Smoking status: Former Smoker     Packs/day: 0.00     Years: 0.00     Pack years: 0.00     Last attempt to quit: 1961     Years since quittin.3    Smokeless tobacco: Never Used   Substance and Sexual Activity    Alcohol use: No    Drug use: No    Sexual activity: Not Currently     Partners: Male     Birth control/protection: See Surgical Hx       Review of Systems   Constitutional: Positive for chills, fatigue and fever.   HENT: Positive for rhinorrhea. Negative for trouble swallowing.    Eyes: Negative for photophobia and visual disturbance.   Respiratory: Positive for cough (dry). Negative for shortness of breath.    Cardiovascular: Positive for leg swelling. Negative for chest pain.   Gastrointestinal: Positive for nausea. Negative for abdominal distention, abdominal  pain and diarrhea.   Genitourinary: Negative for difficulty urinating and dysuria.   Musculoskeletal: Negative for arthralgias and myalgias.   Allergic/Immunologic: Positive for immunocompromised state.   Neurological: Positive for headaches. Negative for syncope.   Psychiatric/Behavioral: Negative for behavioral problems and confusion.     Objective:     Vital Signs (Most Recent):  Temp: 98.6 °F (37 °C) (01/16/20 2330)  Pulse: 106 (01/16/20 2343)  Resp: 18 (01/16/20 2330)  BP: (!) 104/55 (01/16/20 2330)  SpO2: 96 % (01/16/20 2330) Vital Signs (24h Range):  Temp:  [98.6 °F (37 °C)-104.3 °F (40.2 °C)] 98.6 °F (37 °C)  Pulse:  [] 106  Resp:  [17-24] 18  SpO2:  [96 %-100 %] 96 %  BP: (104-165)/(55-74) 104/55     Weight: 63.7 kg (140 lb 6.9 oz)  Body mass index is 22.67 kg/m².  Body surface area is 1.72 meters squared.      Intake/Output Summary (Last 24 hours) at 1/17/2020 0109  Last data filed at 1/16/2020 2326  Gross per 24 hour   Intake 2011 ml   Output --   Net 2011 ml       Physical Exam   Constitutional: She is oriented to person, place, and time. She appears well-developed. No distress.   HENT:   Head: Normocephalic and atraumatic.   Eyes: EOM are normal.   Neck: Normal range of motion. Neck supple.   Cardiovascular: Normal rate and regular rhythm.   Pulmonary/Chest: Effort normal. No respiratory distress.   Abdominal: Soft. She exhibits no distension. There is no tenderness.   Musculoskeletal: She exhibits edema (1+ BLE up to knees). She exhibits no tenderness.   Neurological: She is alert and oriented to person, place, and time.   Skin: Skin is warm and dry.   Psychiatric: She has a normal mood and affect. Her behavior is normal.   Nursing note and vitals reviewed.      Significant Labs:   CBC:   Recent Labs   Lab 01/16/20 2050   WBC 5.31   HGB 8.9*   HCT 29.7*      , CMP:   Recent Labs   Lab 01/16/20 2050      K 3.1*   *   CO2 15*   GLU 73   BUN 22   CREATININE 1.5*   CALCIUM 7.3*    PROT 4.9*   ALBUMIN 2.4*   BILITOT 2.0*   ALKPHOS 321*   AST 1,300*   *   ANIONGAP 9   EGFRNONAA 33.8*    and Urine Studies:   Recent Labs   Lab 01/16/20  2108   COLORU Yellow   APPEARANCEUA Clear   PHUR 6.0   SPECGRAV 1.010   PROTEINUA Negative   GLUCUA Negative   KETONESU Negative   BILIRUBINUA Negative   OCCULTUA Negative   NITRITE Negative   LEUKOCYTESUR Negative   RBCUA 1   WBCUA 0   BACTERIA Rare       Diagnostic Results:  CXR (01/16/2020):  - No consolidation, pleural effusion or pneumothorax.  - Cardiomediastinal silhouette is unchanged.    Assessment/Plan:     * Sepsis  Patient is a 74 year old woman with metastatic lung adenocarcinoma who presents with fevers. She was febrile up to 104.3F in the ED and tachycardic upon admission. Otherwise, other vitals were stable. She had a normal WBC.     DDx broadly at this time include bacteremia, cholecystitis, influenza, UTI, PNA, meningitis.     Plan:  - Continue broad spectrum antibiotics vancomycin and zosyn  - Patient received 30mg/kg IVF in ED. First lactic acid WNL but second one elevated to 3.5. Will give additional bolus and repeat lactic acid   - CXR showed no acute process, UA not suggestive of UTI. Flu was negative. Follow up blood cultures  - Abdominal US, hepatitis panel ordered as mentioned in elevated LFTs    Elevated LFTs  Patient's LFTs on admission were elevated (AST 1300,  from prior a month ago AST 39, ). She reports no new medication additions or changes. Likely related to sepsis vs possible liver mets    Plan:  - Follow up abdominal ultrasound, hepatitis panel  - Hold home atorvastatin for now  - Trend daily CMP    History of deep vein thrombosis (DVT) of lower extremity  Continue home Eliquis 5mg BID    Insomnia  Continue home amitriptyline 50mg QHS    Dyslipidemia  Holding home atorvastatin in the setting of elevated LFTs. Resume when appropriate.    Debility  PT/OT consulted, appreciate assistance    CKD (chronic  kidney disease) stage 3, GFR 30-59 ml/min  It appears from past year, Cr mostly ranged from 1.7-1.9. On admission, Cr 1.5.  - Monitor daily CMP      Hypomagnesemia  HYPOKALEMIA    Replete electrolytes PRN    Malignant neoplasm of lower lobe of right lung  Patient is a 74 year old woman with metastatic lung adenocarcinoma on Xgeva and Tarceva who follows with Dr. Vazquez in clinic.  - Per pharmacy, only staff can order Tarceva  - Will need outpatient follow up with Dr. Vazquez upon discharge    Meningioma  Patient is s/p resection in 2016  Has been on keppra as seizure prophylaxis since - continue home keppra 500mg BID    Essential hypertension  Hold home losartan and metoprolol in the setting of sepsis. Resume when appropriate.      Patient seen, and case to be discussed with staff. Attending attestation to follow. Please contact Medical Oncology with any questions.      Karly Cartwright MD  Hematology/Oncology  [facility]    Attending Note  I have personally taken the history and examined this patient and agree with the resident's note as stated above.  GN sepsis- will stop antibiotics and otherwise continue coverage as above  Her lactate is decreasing, wbc remains elevated  Review ultrasound- further imaging if needed  We reviewed her lfts and they are trending down- Tarceva held  We also discussed genetic referral with her history

## 2020-01-17 NOTE — PROGRESS NOTES
Pharmacokinetic Initial Assessment: IV Vancomycin    Assessment/Plan:    Initiate intravenous vancomycin with loading dose of 1750 mg once followed by a maintenance dose of vancomycin 750mg IV every 24 hours  Desired empiric serum trough concentration is 15 to 20 mcg/mL  Draw vancomycin trough level 30 min prior to third dose on 1/19 at approximately 0030  Pharmacy will continue to follow and monitor vancomycin.      Please contact pharmacy at extension [phone] with any questions regarding this assessment.     Thank you for the consult,   Clarisa Spencer       Patient brief summary:  Naty St is a 74 y.o. female initiated on antimicrobial therapy with IV Vancomycin for treatment of suspected bacteremia    Drug Allergies:   Review of patient's allergies indicates:   Allergen Reactions    Tobradex [tobramycin-dexamethasone] Swelling    Iodinated contrast media Hives    Latex Rash and Hives    Phenytoin sodium extended Other (See Comments) and Rash       Actual Body Weight:   63.7kg    Renal Function:   Estimated Creatinine Clearance: 30.3 mL/min (A) (based on SCr of 1.5 mg/dL (H)).,     Dialysis Method (if applicable):  N/A    CBC (last 72 hours):  Recent Labs   Lab Result Units 01/16/20 2050   WBC K/uL 5.31   Hemoglobin g/dL 8.9*   Hematocrit % 29.7*   Platelets K/uL 178   Gran% % 89.6*   Lymph% % 5.6*   Mono% % 4.0   Eosinophil% % 0.2   Basophil% % 0.2   Differential Method  Automated       Metabolic Panel (last 72 hours):  Recent Labs   Lab Result Units 01/16/20 2050 01/16/20  2108   Sodium mmol/L 142  --    Potassium mmol/L 3.1*  --    Chloride mmol/L 118*  --    CO2 mmol/L 15*  --    Glucose mg/dL 73  --    Glucose, UA   --  Negative   BUN, Bld mg/dL 22  --    Creatinine mg/dL 1.5*  --    Albumin g/dL 2.4*  --    Total Bilirubin mg/dL 2.0*  --    Alkaline Phosphatase U/L 321*  --    AST U/L 1,300*  --    ALT U/L 357*  --    Magnesium mg/dL 1.4*  --    Phosphorus mg/dL 1.7*  --        Drug  levels (last 3 results):  No results for input(s): VANCOMYCINRA, VANCOMYCINPE, VANCOMYCINTR in the last 72 hours.    Microbiologic Results:  Microbiology Results (last 7 days)     Procedure Component Value Units Date/Time    Influenza A & B by Molecular [[id number]] Collected:  01/16/20 2105    Order Status:  Completed Specimen:  Nasopharyngeal Swab Updated:  01/16/20 2152     Influenza A, Molecular Negative     Influenza B, Molecular Negative     Flu A & B Source Nasal swab    Blood culture x two cultures. Draw prior to antibiotics. [[id number]] Collected:  01/16/20 2050    Order Status:  Sent Specimen:  Blood from Peripheral, Hand, Right Updated:  01/16/20 2131    Blood culture x two cultures. Draw prior to antibiotics. [[id number]] Collected:  01/16/20 2108    Order Status:  Sent Specimen:  Blood from Peripheral, Upper Arm, Left Updated:  01/16/20 2131

## 2020-01-17 NOTE — SUBJECTIVE & OBJECTIVE
Oncology Treatment Plan:   [No treatment plan]    Medications:  Continuous Infusions:  Scheduled Meds:   amitriptyline  50 mg Oral QHS    apixaban  5 mg Oral BID    dronabinol  5 mg Oral BID AC    lactated ringers  500 mL Intravenous Once    levETIRAcetam  500 mg Oral BID    lipase-protease-amylase 12,000-38,000-60,000 units  1 capsule Oral TID WM    LORazepam  1 mg Oral QHS    losartan  50 mg Oral BID    metoprolol succinate  50 mg Oral Daily    piperacillin-tazobactam (ZOSYN) IVPB  4.5 g Intravenous Q8H    vancomycin (VANCOCIN) IVPB  1,750 mg Intravenous Once    [START ON 1/18/2020] vancomycin (VANCOCIN) IVPB  750 mg Intravenous Q24H     PRN Meds:Dextrose 10% Bolus, Dextrose 10% Bolus, glucagon (human recombinant), glucose, glucose, sodium chloride 0.9%     Review of patient's allergies indicates:   Allergen Reactions    Tobradex [tobramycin-dexamethasone] Swelling    Iodinated contrast media Hives    Latex Rash and Hives    Phenytoin sodium extended Other (See Comments) and Rash        Past Medical History:   Diagnosis Date    Anticoagulant long-term use     Blood clot in vein 06/2016    Breast cancer 1994    Cataract     Hypertension     Lung cancer     Lung cancer metastatic to brain     Pancreatic cancer 1998    Posterior capsular opacification, left eye     Psychiatric problem     Seizures 01/25/2016    Stroke     Therapy      Past Surgical History:   Procedure Laterality Date    BONE BIOSPY  3/9/16    BRAIN SURGERY  1/12/16    tumor removal 1/12/16    BREAST LUMPECTOMY  1998    left    CATARACT EXTRACTION Bilateral 2004    yag OU    CHOLECYSTECTOMY  1998    COLONOSCOPY N/A 11/13/2015    Procedure: COLONOSCOPY;  Surgeon: Alex Carmona MD;  Location: [address]);  Service: Endoscopy;  Laterality: N/A;  2 year f/u    COLONOSCOPY N/A 11/7/2018    Procedure: COLONOSCOPY;  Surgeon: Jim Raman MD;  Location: [facility] ([address] [place] [address]);  Service: Endoscopy;  Laterality:  N/A;  Eliquis - per Dr. Vazquez -ok to hold Eliquis x 2 days prior to colon- ERW    cysto and right ureteral stent  12    ecoli      removal of blockage, done throat, then through the liver.    HYSTERECTOMY  1974    KIDNEY STONE SURGERY  --laser    left eswl  12    OOPHORECTOMY  2012    Laparoscopic BSO, lysis of adhesions, cystoscopy greater than 35mins     WHIPPLE PROCEDURE W/ LAPAROSCOPY       Family History     Problem Relation (Age of Onset)    Breast cancer Mother    Leukemia Paternal Grandfather    Lung cancer Mother    Stomach cancer Paternal Grandmother        Tobacco Use    Smoking status: Former Smoker     Packs/day: 0.00     Years: 0.00     Pack years: 0.00     Last attempt to quit: 1961     Years since quittin.3    Smokeless tobacco: Never Used   Substance and Sexual Activity    Alcohol use: No    Drug use: No    Sexual activity: Not Currently     Partners: Male     Birth control/protection: See Surgical Hx       Review of Systems   Constitutional: Positive for chills, fatigue and fever.   HENT: Positive for rhinorrhea. Negative for trouble swallowing.    Eyes: Negative for photophobia and visual disturbance.   Respiratory: Positive for cough (dry). Negative for shortness of breath.    Cardiovascular: Positive for leg swelling. Negative for chest pain.   Gastrointestinal: Positive for nausea. Negative for abdominal distention, abdominal pain and diarrhea.   Genitourinary: Negative for difficulty urinating and dysuria.   Musculoskeletal: Negative for arthralgias and myalgias.   Allergic/Immunologic: Positive for immunocompromised state.   Neurological: Positive for headaches. Negative for syncope.   Psychiatric/Behavioral: Negative for behavioral problems and confusion.     Objective:     Vital Signs (Most Recent):  Temp: 98.6 °F (37 °C) (20)  Pulse: 106 (203)  Resp: 18 (20)  BP: (!) 104/55 (20)  SpO2: 96 %  (01/16/20 2330) Vital Signs (24h Range):  Temp:  [98.6 °F (37 °C)-104.3 °F (40.2 °C)] 98.6 °F (37 °C)  Pulse:  [] 106  Resp:  [17-24] 18  SpO2:  [96 %-100 %] 96 %  BP: (104-165)/(55-74) 104/55     Weight: 63.7 kg (140 lb 6.9 oz)  Body mass index is 22.67 kg/m².  Body surface area is 1.72 meters squared.      Intake/Output Summary (Last 24 hours) at 1/17/2020 0109  Last data filed at 1/16/2020 2326  Gross per 24 hour   Intake 2011 ml   Output --   Net 2011 ml       Physical Exam   Constitutional: She is oriented to person, place, and time. She appears well-developed. No distress.   HENT:   Head: Normocephalic and atraumatic.   Eyes: EOM are normal.   Neck: Normal range of motion. Neck supple.   Cardiovascular: Normal rate and regular rhythm.   Pulmonary/Chest: Effort normal. No respiratory distress.   Abdominal: Soft. She exhibits no distension. There is no tenderness.   Musculoskeletal: She exhibits edema (1+ BLE up to knees). She exhibits no tenderness.   Neurological: She is alert and oriented to person, place, and time.   Skin: Skin is warm and dry.   Psychiatric: She has a normal mood and affect. Her behavior is normal.   Nursing note and vitals reviewed.      Significant Labs:   CBC:   Recent Labs   Lab 01/16/20 2050   WBC 5.31   HGB 8.9*   HCT 29.7*      , CMP:   Recent Labs   Lab 01/16/20 2050      K 3.1*   *   CO2 15*   GLU 73   BUN 22   CREATININE 1.5*   CALCIUM 7.3*   PROT 4.9*   ALBUMIN 2.4*   BILITOT 2.0*   ALKPHOS 321*   AST 1,300*   *   ANIONGAP 9   EGFRNONAA 33.8*    and Urine Studies:   Recent Labs   Lab 01/16/20 2108   COLORU Yellow   APPEARANCEUA Clear   PHUR 6.0   SPECGRAV 1.010   PROTEINUA Negative   GLUCUA Negative   KETONESU Negative   BILIRUBINUA Negative   OCCULTUA Negative   NITRITE Negative   LEUKOCYTESUR Negative   RBCUA 1   WBCUA 0   BACTERIA Rare       Diagnostic Results:  CXR (01/16/2020):  - No consolidation, pleural effusion or pneumothorax.  -  Cardiomediastinal silhouette is unchanged.

## 2020-01-17 NOTE — TELEPHONE ENCOUNTER
----- Message from Alina Sigala sent at 1/17/2020  9:25 AM CST -----  Contact: Basil (son)  Staff Message     Caller name: Basil     Reason for call: Wants to follow up with the nurse, pt was admitted to the hospital last night. Has a few questions for Dr Vazquez.         Communication Preference: [phone]    Additional Information:

## 2020-01-17 NOTE — ASSESSMENT & PLAN NOTE
It appears from past year, Cr mostly ranged from 1.7-1.9. On admission, Cr 1.5.  - Monitor daily CMP

## 2020-01-17 NOTE — ED TRIAGE NOTES
Naty St, a 74 y.o. female presents to the ED w/ complaint of fevers/ chills at home. Patient also complains of generalized fatigued    Patient denies chest pain, shortness of breath, low back pain, nausea, vomiting and abdominal pain         Triage note:  Chief Complaint   Patient presents with    Fatigue     Pt hx of cancer, febrile, tachypenic. C/o malaise     Review of patient's allergies indicates:   Allergen Reactions    Tobradex [tobramycin-dexamethasone] Swelling    Iodinated contrast media Hives    Latex Rash and Hives    Phenytoin sodium extended Other (See Comments) and Rash     Past Medical History:   Diagnosis Date    Anticoagulant long-term use     Blood clot in vein 06/2016    Breast cancer 1994    Cataract     Hypertension     Lung cancer     Lung cancer metastatic to brain     Pancreatic cancer 1998    Posterior capsular opacification, left eye     Psychiatric problem     Seizures 01/25/2016    Stroke     Therapy

## 2020-01-17 NOTE — TELEPHONE ENCOUNTER
Spoke with son. She is calling to inform dr sullivan patient is admitted at [facility] for suspected sepsis. Son just wanted dr sullivan to be aware.  Nurse informed son she would forward message to dr sullivan.  He thanked nurse.      Message routed to dr sullivan

## 2020-01-17 NOTE — PLAN OF CARE
Patient is oriented X4. Ambulates to bathroom with minimal assist. PIV to right wrist flushed and SL. No complaints of pain or discomfort at this time. LR bolus given. Potassium, Mg, and Phos replaced. ABT continued; Remained afebrile. Plan of care reviewed with patient and family. All safety precautions in place. Remains free of injury. Patient is stable. Will continue to monitor.

## 2020-01-17 NOTE — PLAN OF CARE
Problem: Physical Therapy Goal  Goal: Physical Therapy Goal  Description  Goals to be met by: 2020     Patient will increase functional independence with mobility by performin. Supine to sit with Set-up [place]  2. Sit to supine with Set-up [place]  3. Sit to stand transfer with Supervision  4. Bed to chair transfer with Supervision using LRAD  5. Gait  x 250 feet with Supervision using Rolling Walker.   6. Ascend/descend 8 stairs with Handrail and Supervision.   7. Lower extremity exercise program x15 reps per handout, with supervision     Outcome: Ongoing, Progressing     Goals set

## 2020-01-17 NOTE — ASSESSMENT & PLAN NOTE
Patient is s/p resection in 2016  Has been on keppra as seizure prophylaxis since - continue home keppra 500mg BID

## 2020-01-17 NOTE — EKG INTERPRETATIONS - EMERGENCY DEPT.
Independently interpreted by MD:  Rate 101, NSR, no stemi, no ectopy, no hypertrophy, nonspecific T wave changes

## 2020-01-17 NOTE — PT/OT/SLP EVAL
Physical Therapy Evaluation    Patient Name:  Naty St   MRN:  [id number]    Recommendations:     Discharge Recommendations:  home health PT   Discharge Equipment Recommendations: none   Barriers to discharge: None    Assessment:     Naty St is a 74 y.o. female admitted with a medical diagnosis of Sepsis.  She presents with the following impairments/functional limitations:  weakness, impaired endurance, impaired functional mobilty, gait instability, impaired balance, impaired self care skills Pt. cooperative and tolerated treatment well.    Rehab Prognosis: Good; patient would benefit from acute skilled PT services to address these deficits and reach maximum level of function.    Recent Surgery: * No surgery found *      Plan:     During this hospitalization, patient to be seen 3 x/week to address the identified rehab impairments via gait training, therapeutic activities, therapeutic exercises and progress toward the following goals:    · Plan of Care Expires:  02/16/20    Subjective     Chief Complaint: weakness  Patient/Family Comments/goals: to get stronger  Pain/Comfort:  · Pain Rating 1: 0/10  · Pain Rating Post-Intervention 1: 0/10    Patients cultural, spiritual, Religion conflicts given the current situation: no    Living Environment:  Pt. Lives with spouse in [facility] with 8 DENNY and (B) LE handrails  Prior to admission, patients level of function was indep.  Equipment used at home: bedside commode, shower chair, walker, rolling, cane, straight.  Upon discharge, patient will have assistance from spouse.    Objective:     Communicated with nursing prior to session.  Patient found supine with peripheral IV, telemetry  upon PT entry to room.    General Precautions: Standard, fall   Orthopedic Precautions:N/A   Braces:       Exams:  · RLE ROM: WFL  · RLE Strength: WFL  · LLE ROM: WFL  · LLE Strength: WFL    Functional Mobility:  · Bed Mobility:     · Rolling Right: stand by  assistance  · Scooting: stand by assistance  · Supine to Sit: stand by assistance  · Sit to Supine: stand by assistance  · Transfers:     · Sit to Stand:  stand by assistance and contact guard assistance with rolling walker  · Gait: 180' with RW and SBA-CGA without significant LOB. Pt. with unsteady gait without AD.  · Balance: fair+  · Stairs:  Pt ascended/descended 4 stair(s) with No Assistive Device with right handrail with Stand-by Assistance.       Therapeutic Activities and Exercises:   Discussed safety with mobility and use of RW, safety with stairs, therapy needs, goals, and POC.    AM-PAC 6 CLICK MOBILITY  Total Score:22     Patient left supine with all lines intact and call button in reach.    GOALS:   Multidisciplinary Problems     Physical Therapy Goals        Problem: Physical Therapy Goal    Goal Priority Disciplines Outcome Goal Variances Interventions   Physical Therapy Goal     PT, PT/OT Ongoing, Progressing     Description:  Goals to be met by: 2020     Patient will increase functional independence with mobility by performin. Supine to sit with Set-up [place]  2. Sit to supine with Set-up [place]  3. Sit to stand transfer with Supervision  4. Bed to chair transfer with Supervision using LRAD  5. Gait  x 250 feet with Supervision using Rolling Walker.   6. Ascend/descend 8 stairs with Handrail and Supervision.   7. Lower extremity exercise program x15 reps per handout, with supervision                      History:     Past Medical History:   Diagnosis Date    Anticoagulant long-term use     Blood clot in vein 2016    Breast cancer     Cataract     Hypertension     Lung cancer     Lung cancer metastatic to brain     Pancreatic cancer     Posterior capsular opacification, left eye     Psychiatric problem     Seizures 2016    Stroke     Therapy        Past Surgical History:   Procedure Laterality Date    BONE BIOSPY  3/9/16    BRAIN SURGERY   1/12/16    tumor removal 1/12/16    BREAST LUMPECTOMY  1998    left    CATARACT EXTRACTION Bilateral 2004    yag OU    CHOLECYSTECTOMY  1998    COLONOSCOPY N/A 11/13/2015    Procedure: COLONOSCOPY;  Surgeon: Alex Carmona MD;  [facility]  Service: Endoscopy;  Laterality: N/A;  2 year f/u    COLONOSCOPY N/A 11/7/2018    Procedure: COLONOSCOPY;  Surgeon: Jim Raman MD;  [facility]  Service: Endoscopy;  Laterality: N/A;  Eliquis - per Dr. Vazquez -ok to hold Eliquis x 2 days prior to colon- ERW    cysto and right ureteral stent  4/27/12    ecoli  2010    removal of blockage, done throat, then through the liver.    HYSTERECTOMY  1974    KIDNEY STONE SURGERY  2010--laser    left eswl  6/20/12    OOPHORECTOMY  4/25/2012    Laparoscopic BSO, lysis of adhesions, cystoscopy greater than 35mins     WHIPPLE PROCEDURE W/ LAPAROSCOPY  1998       Time Tracking:     PT Received On: 01/17/20  PT Start Time: 1038     PT Stop Time: 1055  PT Total Time (min): 17 min     Billable Minutes: Evaluation 9 and Therapeutic Activity 8      Ruddy Levine, PT  01/17/2020

## 2020-01-17 NOTE — PROGRESS NOTES
Therapy with vanc complete and/or consult discontinued by provider.  Pharmacy will sign off, please re-consult as needed.    Thanks for the consult  Redd Fernandez PharmD, [facility]  Clinical Pharmacist  Spectra Link: [phone]

## 2020-01-18 PROBLEM — E87.29 HYPERCHLOREMIC METABOLIC ACIDOSIS: Status: ACTIVE | Noted: 2020-01-18

## 2020-01-18 LAB
ACANTHOCYTES BLD QL SMEAR: PRESENT
ALBUMIN SERPL BCP-MCNC: 2.2 G/DL (ref 3.5–5.2)
ALP SERPL-CCNC: 213 U/L (ref 55–135)
ALT SERPL W/O P-5'-P-CCNC: 214 U/L (ref 10–44)
ANION GAP SERPL CALC-SCNC: 10 MMOL/L (ref 8–16)
ANISOCYTOSIS BLD QL SMEAR: SLIGHT
AST SERPL-CCNC: 289 U/L (ref 10–40)
BASOPHILS # BLD AUTO: 0.03 K/UL (ref 0–0.2)
BASOPHILS NFR BLD: 0.2 % (ref 0–1.9)
BILIRUB SERPL-MCNC: 2.5 MG/DL (ref 0.1–1)
BUN SERPL-MCNC: 21 MG/DL (ref 8–23)
BURR CELLS BLD QL SMEAR: ABNORMAL
CALCIUM SERPL-MCNC: 7.3 MG/DL (ref 8.7–10.5)
CHLORIDE SERPL-SCNC: 122 MMOL/L (ref 95–110)
CHLORIDE UR-SCNC: 92 MMOL/L (ref 25–200)
CO2 SERPL-SCNC: 12 MMOL/L (ref 23–29)
CREAT SERPL-MCNC: 1.8 MG/DL (ref 0.5–1.4)
DACRYOCYTES BLD QL SMEAR: ABNORMAL
DIFFERENTIAL METHOD: ABNORMAL
EOSINOPHIL # BLD AUTO: 0.1 K/UL (ref 0–0.5)
EOSINOPHIL NFR BLD: 0.4 % (ref 0–8)
ERYTHROCYTE [DISTWIDTH] IN BLOOD BY AUTOMATED COUNT: 15.6 % (ref 11.5–14.5)
EST. GFR  (AFRICAN AMERICAN): 31.3 ML/MIN/1.73 M^2
EST. GFR  (NON AFRICAN AMERICAN): 27.1 ML/MIN/1.73 M^2
GLUCOSE SERPL-MCNC: 79 MG/DL (ref 70–110)
HCT VFR BLD AUTO: 30.2 % (ref 37–48.5)
HGB BLD-MCNC: 8.1 G/DL (ref 12–16)
HYPOCHROMIA BLD QL SMEAR: ABNORMAL
IMM GRANULOCYTES # BLD AUTO: 0.22 K/UL (ref 0–0.04)
IMM GRANULOCYTES NFR BLD AUTO: 1.4 % (ref 0–0.5)
LYMPHOCYTES # BLD AUTO: 1 K/UL (ref 1–4.8)
LYMPHOCYTES NFR BLD: 6.5 % (ref 18–48)
MAGNESIUM SERPL-MCNC: 1.9 MG/DL (ref 1.6–2.6)
MCH RBC QN AUTO: 27.2 PG (ref 27–31)
MCHC RBC AUTO-ENTMCNC: 26.8 G/DL (ref 32–36)
MCV RBC AUTO: 101 FL (ref 82–98)
MONOCYTES # BLD AUTO: 1.4 K/UL (ref 0.3–1)
MONOCYTES NFR BLD: 8.8 % (ref 4–15)
NEUTROPHILS # BLD AUTO: 12.9 K/UL (ref 1.8–7.7)
NEUTROPHILS NFR BLD: 82.7 % (ref 38–73)
NRBC BLD-RTO: 0 /100 WBC
OVALOCYTES BLD QL SMEAR: ABNORMAL
PHOSPHATE SERPL-MCNC: 2.4 MG/DL (ref 2.7–4.5)
PLATELET # BLD AUTO: 158 K/UL (ref 150–350)
PLATELET BLD QL SMEAR: ABNORMAL
PMV BLD AUTO: 11.5 FL (ref 9.2–12.9)
POIKILOCYTOSIS BLD QL SMEAR: SLIGHT
POLYCHROMASIA BLD QL SMEAR: ABNORMAL
POTASSIUM SERPL-SCNC: 4.3 MMOL/L (ref 3.5–5.1)
POTASSIUM UR-SCNC: 13 MMOL/L (ref 15–95)
PROT SERPL-MCNC: 4.8 G/DL (ref 6–8.4)
RBC # BLD AUTO: 2.98 M/UL (ref 4–5.4)
SCHISTOCYTES BLD QL SMEAR: ABNORMAL
SCHISTOCYTES BLD QL SMEAR: PRESENT
SODIUM SERPL-SCNC: 144 MMOL/L (ref 136–145)
SODIUM UR-SCNC: 91 MMOL/L (ref 20–250)
TARGETS BLD QL SMEAR: ABNORMAL
WBC # BLD AUTO: 15.64 K/UL (ref 3.9–12.7)

## 2020-01-18 PROCEDURE — 99233 PR SUBSEQUENT HOSPITAL CARE,LEVL III: ICD-10-PCS | Mod: HCNC,GC,, | Performed by: INTERNAL MEDICINE

## 2020-01-18 PROCEDURE — 63600175 PHARM REV CODE 636 W HCPCS: Mod: HCNC | Performed by: STUDENT IN AN ORGANIZED HEALTH CARE EDUCATION/TRAINING PROGRAM

## 2020-01-18 PROCEDURE — 82436 ASSAY OF URINE CHLORIDE: CPT | Mod: HCNC

## 2020-01-18 PROCEDURE — 85025 COMPLETE CBC W/AUTO DIFF WBC: CPT | Mod: HCNC

## 2020-01-18 PROCEDURE — 84300 ASSAY OF URINE SODIUM: CPT | Mod: HCNC

## 2020-01-18 PROCEDURE — 63600175 PHARM REV CODE 636 W HCPCS: Mod: HCNC | Performed by: INTERNAL MEDICINE

## 2020-01-18 PROCEDURE — 25000003 PHARM REV CODE 250: Mod: HCNC | Performed by: STUDENT IN AN ORGANIZED HEALTH CARE EDUCATION/TRAINING PROGRAM

## 2020-01-18 PROCEDURE — 83735 ASSAY OF MAGNESIUM: CPT | Mod: HCNC

## 2020-01-18 PROCEDURE — 80053 COMPREHEN METABOLIC PANEL: CPT | Mod: HCNC

## 2020-01-18 PROCEDURE — 84100 ASSAY OF PHOSPHORUS: CPT | Mod: HCNC

## 2020-01-18 PROCEDURE — 36415 COLL VENOUS BLD VENIPUNCTURE: CPT | Mod: HCNC

## 2020-01-18 PROCEDURE — 84133 ASSAY OF URINE POTASSIUM: CPT | Mod: HCNC

## 2020-01-18 PROCEDURE — 20600001 HC STEP DOWN PRIVATE ROOM: Mod: HCNC

## 2020-01-18 PROCEDURE — 25000003 PHARM REV CODE 250: Mod: HCNC | Performed by: INTERNAL MEDICINE

## 2020-01-18 PROCEDURE — 99233 SBSQ HOSP IP/OBS HIGH 50: CPT | Mod: HCNC,GC,, | Performed by: INTERNAL MEDICINE

## 2020-01-18 RX ADMIN — PIPERACILLIN SODIUM,TAZOBACTAM SODIUM 4.5 G: 4; .5 INJECTION, POWDER, FOR SOLUTION INTRAVENOUS at 04:01

## 2020-01-18 RX ADMIN — PANCRELIPASE 1 CAPSULE: 60000; 12000; 38000 CAPSULE, DELAYED RELEASE PELLETS ORAL at 05:01

## 2020-01-18 RX ADMIN — DRONABINOL 5 MG: 2.5 CAPSULE ORAL at 08:01

## 2020-01-18 RX ADMIN — PIPERACILLIN SODIUM,TAZOBACTAM SODIUM 4.5 G: 4; .5 INJECTION, POWDER, FOR SOLUTION INTRAVENOUS at 12:01

## 2020-01-18 RX ADMIN — AMITRIPTYLINE HYDROCHLORIDE 50 MG: 50 TABLET, FILM COATED ORAL at 09:01

## 2020-01-18 RX ADMIN — LEVETIRACETAM 500 MG: 500 TABLET ORAL at 09:01

## 2020-01-18 RX ADMIN — LORAZEPAM 1 MG: 1 TABLET ORAL at 09:01

## 2020-01-18 RX ADMIN — PANCRELIPASE 1 CAPSULE: 60000; 12000; 38000 CAPSULE, DELAYED RELEASE PELLETS ORAL at 12:01

## 2020-01-18 RX ADMIN — APIXABAN 5 MG: 5 TABLET, FILM COATED ORAL at 09:01

## 2020-01-18 RX ADMIN — SODIUM BICARBONATE: 84 INJECTION, SOLUTION INTRAVENOUS at 12:01

## 2020-01-18 RX ADMIN — PANCRELIPASE 1 CAPSULE: 60000; 12000; 38000 CAPSULE, DELAYED RELEASE PELLETS ORAL at 08:01

## 2020-01-18 RX ADMIN — PIPERACILLIN SODIUM,TAZOBACTAM SODIUM 4.5 G: 4; .5 INJECTION, POWDER, FOR SOLUTION INTRAVENOUS at 08:01

## 2020-01-18 RX ADMIN — APIXABAN 5 MG: 5 TABLET, FILM COATED ORAL at 08:01

## 2020-01-18 RX ADMIN — LEVETIRACETAM 500 MG: 500 TABLET ORAL at 08:01

## 2020-01-18 RX ADMIN — DRONABINOL 5 MG: 2.5 CAPSULE ORAL at 05:01

## 2020-01-18 RX ADMIN — PIPERACILLIN SODIUM,TAZOBACTAM SODIUM 4.5 G: 4; .5 INJECTION, POWDER, FOR SOLUTION INTRAVENOUS at 11:01

## 2020-01-18 RX ADMIN — POLYETHYLENE GLYCOL 3350 17 G: 17 POWDER, FOR SOLUTION ORAL at 08:01

## 2020-01-18 NOTE — ASSESSMENT & PLAN NOTE
Patient's LFTs on admission were elevated (AST 1300,  from prior a month ago AST 39, ). She reports no new medication additions or changes. Likely related to sepsis vs possible liver mets    Plan:  - Abdominal US: Mild intrahepatic biliary ductal dilatation presumably secondary to patient's Whipple procedure.  - Acute hepatitis panel is negative. Unclear etiology but possible sepsis causing elevation in LFTs.  - Hold home atorvastatin for now  - Trend daily CMP

## 2020-01-18 NOTE — ASSESSMENT & PLAN NOTE
Patient is a 74 year old woman with metastatic lung adenocarcinoma who presents with fevers. She was febrile up to 104.3F in the ED and tachycardic upon admission. Otherwise, other vitals were stable. She had a normal WBC.     Blood cultures growing gram negative rods. Unclear source as UA is negative, CXR non-revealing and CT chest-abd-pelvis is unremarkable. Possibly biliary? Pt is asymptomatic other than the fever which is now subsiding. WBC trending down.     Plan:  - Patient received 30mg/kg IVF in ED. First lactic acid WNL but second one elevated to 3.5. Repeat lactic acid is now down to normal.  - CXR showed no acute process, UA not suggestive of UTI. Flu was negative.  - Blood cultures growing Gram - rods. Follow speciation and sensitivities.   - Continue Zosyn for now. Discontinued vancomycin.   - Monitor fever, if not improving will consult ID.

## 2020-01-18 NOTE — ASSESSMENT & PLAN NOTE
Pt noted to have worsening HCO3 with hyperchloremia. AG of 10.  Likely RTA in the setting of CKD +/- medication induced    - Urine elctrolytes  - Will replete with IV HCO23 infusion 1L for now.

## 2020-01-18 NOTE — ASSESSMENT & PLAN NOTE
It appears from past year, Cr mostly ranged from 1.7-1.9. On admission, Cr 1.5.    - Monitor daily CMP  - Avoid nephrotoxic agents.

## 2020-01-18 NOTE — PLAN OF CARE
Pt AAOx4, with Tmax 101 overnight, ice packs applied and temp resolved spontaneously. MD aware, repeat blood cx ordered. IV zosyn continued. Pt with poor venous access, new PIV placed by rapid response. Sinus tachy when febrile. All other VSS. Pt with nonskid footwear on and bed locked and in lowest position with bed rails up x2. Call light is within reach and Pt instructed to call for assistance as needed. Will continue to monitor.

## 2020-01-18 NOTE — SUBJECTIVE & OBJECTIVE
Interval History:   - Repeat blood cultures remain positive for gram- rods. Speciation pending.  - Patient reports feeling better. Spiked a fever overnight although WBC is trending down.   - CT chest/abd/pelvis negative. No source identified.    Oncology Treatment Plan:   [No treatment plan]    Medications:  Continuous Infusions:   sodium bicarbonate drip 100 mL/hr at 01/18/20 1221     Scheduled Meds:   amitriptyline  50 mg Oral QHS    apixaban  5 mg Oral BID    dronabinol  5 mg Oral BID AC    levETIRAcetam  500 mg Oral BID    lipase-protease-amylase 12,000-38,000-60,000 units  1 capsule Oral TID WM    LORazepam  1 mg Oral QHS    piperacillin-tazobactam (ZOSYN) IVPB  4.5 g Intravenous Q8H    polyethylene glycol  17 g Oral Daily     PRN Meds:Dextrose 10% Bolus, Dextrose 10% Bolus, glucagon (human recombinant), glucose, glucose, ondansetron, prochlorperazine, sodium chloride 0.9%     Review of Systems   Constitutional: Positive for fatigue and fever. Negative for chills.   HENT: Negative for rhinorrhea and trouble swallowing.    Eyes: Negative for photophobia and visual disturbance.   Respiratory: Negative for cough and shortness of breath.    Cardiovascular: Positive for leg swelling. Negative for chest pain.   Gastrointestinal: Positive for nausea. Negative for abdominal distention, abdominal pain and diarrhea.   Genitourinary: Negative for difficulty urinating and dysuria.   Musculoskeletal: Negative for arthralgias and myalgias.   Allergic/Immunologic: Positive for immunocompromised state.   Neurological: Negative for syncope and headaches.   Psychiatric/Behavioral: Negative for behavioral problems and confusion.     Objective:     Vital Signs (Most Recent):  Temp: 98.7 °F (37.1 °C) (01/18/20 1122)  Pulse: 89 (01/18/20 1122)  Resp: 18 (01/18/20 1122)  BP: (!) 122/58 (01/18/20 1122)  SpO2: 95 % (01/18/20 1122) Vital Signs (24h Range):  Temp:  [98.3 °F (36.8 °C)-101 °F (38.3 °C)] 98.7 °F (37.1 °C)  Pulse:   [] 89  Resp:  [17-20] 18  SpO2:  [93 %-100 %] 95 %  BP: (115-148)/(58-90) 122/58     Weight: 63.5 kg (140 lb)  Body mass index is 22.6 kg/m².  Body surface area is 1.72 meters squared.      Intake/Output Summary (Last 24 hours) at 1/18/2020 1306  Last data filed at 1/18/2020 0945  Gross per 24 hour   Intake 680 ml   Output 600 ml   Net 80 ml       Physical Exam   Constitutional: She is oriented to person, place, and time. She appears well-developed. No distress.   HENT:   Head: Normocephalic and atraumatic.   Eyes: EOM are normal.   Neck: Normal range of motion. Neck supple.   Cardiovascular: Normal rate and regular rhythm.   Pulmonary/Chest: Effort normal. No respiratory distress.   Abdominal: Soft. She exhibits no distension. There is no tenderness.   Musculoskeletal: She exhibits edema (1+ BLE up to knees). She exhibits no tenderness.   Neurological: She is alert and oriented to person, place, and time.   Skin: Skin is warm and dry.   Psychiatric: She has a normal mood and affect. Her behavior is normal.   Nursing note and vitals reviewed.      Significant Labs:   CBC:   Recent Labs   Lab 01/16/20 2050 01/17/20  0607 01/18/20  0437   WBC 5.31 21.24* 15.64*   HGB 8.9* 7.7* 8.1*   HCT 29.7* 25.8* 30.2*    179 158    and CMP:   Recent Labs   Lab 01/16/20 2050 01/17/20  0607 01/18/20  0437    147* 144   K 3.1* 3.4* 4.3   * 121* 122*   CO2 15* 16* 12*   GLU 73 88 79   BUN 22 24* 21   CREATININE 1.5* 1.7* 1.8*   CALCIUM 7.3* 6.9* 7.3*   PROT 4.9* 4.2* 4.8*   ALBUMIN 2.4* 2.0* 2.2*   BILITOT 2.0* 2.2* 2.5*   ALKPHOS 321* 222* 213*   AST 1,300* 634* 289*   * 275* 214*   ANIONGAP 9 10 10   EGFRNONAA 33.8* 29.1* 27.1*       Diagnostic Results:  I have reviewed and interpreted all pertinent imaging results/findings within the past 24 hours.

## 2020-01-18 NOTE — PLAN OF CARE
Pt involved in plan of care and communicating needs throughout shift. Pt afebrile throughout shift; Zosyn administered. Up in room and to bathroom with stand-by assist; no c/o pain. PT/OT consulted today. U/S to abdomen and CT to abm/pelvis/chest today. Tolerating diet, voiding without difficulty.  Electrolytes replaced as ordered. Telemetry maintained; sinus tachycardia and normal sinus rhythm noted. Pt remaining free from falls or injury throughout shift; bed locked and in lowest position; call light within reach.  Pt instructed to call for assistance as needed.  Q1H rounding done on pt.

## 2020-01-18 NOTE — PROGRESS NOTES
"[facility]  Hematology/Oncology  Progress Note    Patient Name: Naty St  Admission Date: 1/16/2020  Hospital Length of Stay: 2 days  Code Status: Prior     Subjective:     HPI:  Ms. St is a 74 year old woman with metastatic R lung adenocarcinoma on Xgeva and Tarceva, breast ca s/p L lumpectomy, meningioma s/p resection, pancreatic ca s/p whipple, and s/p hysterectomy w/ b/l salpingo-oophorectomy, HTN who presents with fevers. She follows with Dr. Vazquez in clinic. Patient says that this past Saturday, she felt as if the "room was spinning", and this stopped on its own. She has experienced this before. She says she has been feeling "hot" for the past week. Her family ( and son) at bedside say that she has been having chills, patient feels weak and that she has looked pale. Patient says that she has been having nausea, vomiting and dry cough. She also says she has been having a runny nose, headaches, and bilateral lower extremity swelling (that has been chronic). She denies diarrhea, dysuria, new rashes, muscle aches. She states that she had the flu shot this year. She denies changes in medications or new additions with the exception of Tarsiva being changed to the generic brand. She denies being around recent sick contacts or travel.     Patient is still receiving Xgeva (about once every 6 weeks, she believes last received around 12/12/19). She reports that Tagrisso was stopped.     ED course: Patient was febrile to 104.3F as well as tachycardic, and labs were notable for elevated LFTs. UA was not suggestive of UTI, and CXR did not show an acute process. He procal was elevated to 2.52. Flu was negative. Her initial lactic acid was WNL but the second one was elevated to 3.5. She received IVF, electrolyte replacement and a dose of zosyn. Patient states that she feels much better, and her family notes that she looks improved since initially presenting to the ED.    Oncologic " "History (per Dr. Vazquez's note 12/4/2019):  Ms. Naty St was admitted to the hospital between 01/25/2016 and 01/28/2016. She has a history of pancreatic cancer 17 years ago, breast cancer and meningioma, presented to the hospital complaining of loss of consciousness. The patient apparently was in her normal state of health and she stood up to go to the bathroom and fell to the floor. The patient's daughter helped the mother up in the bathroom and noted that her mother's upper extremities were shaking and eye rolling. No reports of bowel or bladder incontinence, tongue biting or rolling. The second episode lasted about four minutes and she was extremely lethargic following that. Apparently, the patient had a meningioma resection done in the past; however, recently, underwent neurosurgical resection with Dr. 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    Additional immunohistochemical stains were performed which revealed the tumor cells to be are positive for TTF1 and negative for ER and GCDFP. The morphology and TTF1 positivity are most consistent with lung primary.    Of note, imaging scan at the end of January 2016 revealed a mass in the lung at 2 cm in the medial aspect of the apical segment of the right upper lobe abutting the mediastinum at the level of the azygous vein and abutting and possibly encasing the segmental bronchi and vessels of the apical segment of the right upper lobe and no pleural fluid was present. Also, there is an enlarged right paratracheal lymph node. No evidence of any metastatic disease at the pancreatic site with postoperative changes post Whipple disease  Her PET Scan from 2/15/16 reveal "Hypermetabolic mass in the right lung apex consistent with a primary malignancy. Hypermetabolic mediastinal lymph nodes consistent with metastatic disease. Right sacral hypermetabolic " "lesion consistent with metastatic disease, noting additional mildly sclerotic lesions in multiple vertebral bodies which do not demonstrate abnormal hypermetabolism  She underwent IR bone biopsy which revealed metastatic adenocarcinoma of lung origin. 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  She has completed focal RT to brain lesions in March 2016.    PET scan from 6/6/16 shows "Dramatic almost complete response to therapy."  Lovenox started after US lower ext 6/7/16 shows Acute complete occlusion of one of the left posterior tibial vein.Remote partial thrombus of the proximal left superficial femoral vein.  She is on Tarceva.   12/6/16 PET scan reveals "Stable right upper lobe lesion and right hilar lymph node. No new lesions identified. Trace left pleural effusion".  1/27/17 Renal u/s "Medical renal disease. Nonobstructive right nephrolithiasis"  1/31/17 PET - "Right upper lobe nodule and right hilar lymph node similar and very low grade activity.  There is no definite evidence of recurrence."   11/9/17 PET "In this patient with history of lung cancer, there is interval increase in size and hypermetabolism of a right upper lobe lung lesion concerning for recurrent disease. Additionally, there is interval appearance of a hypermetabolic paratracheal lymph node suspicious for metastatic disease"     She progressed on Tarceva She underwent EBUS on 12/5/17. Pathology revealed adenocarcinoma but T790m could not be done as quantity was not insufficient. Her PET scan from 1/30/18 revealed The patient's previously identified abnormal lesions is slightly greater uptake of FDG on today's study compared with prior exam. These findings are concerning for progression of disease"     She was hospitalized between 4/9/18-4/16/18. Her hospital course was as follows "Patient was admitted to hemonc service on 4/9 with acute hypoxic respiratory failure. She was requiring 4 L of nc on admission. She was started on moxi for CAP. She " "received one dose of ceftriaxone in the ED. On 2nd day of admission she spiked a fever. Her Hb was ~7 g/dl so she was transfused 1 unit of PRBCs. Over night she developed worsening of her symptoms with increased O2 requirements to 15 L HFNC. Her CXR showed worsening congestion. There was a concern of TRALI vs TACO. New 2D echo with diastolic dysfunction. She was given IV lasix pushes as needed and was started on dexamethasone.  Her abx was escalated to vanc and cefepime. She improved with diuresis and steroids. Pulmonary were consulted. Her O2 requirements continued to improve. On 4/15 she was switched to Augmentin. PT recommended discharging her to SNF but the patient refused and she wanted to go home with home health. She qualified for home oxygen so she was discharged on 3 L nc while at rest and 6 while active. Augmentin and dexamethasone were discontinued on discharge"     She was readmitted from 4/27 to 5/3/18 with who presented to the ED with a complaint of fatigue and worsening DELA CRUZ. Pt diagnosed PNA 4/9 and was discharged on 4/16 on 3L oxygen. She was initially placed on cefepime for 3 days followed by an add'l 2 days of Augmentin. Pulm was consulted with assistance as her oxygen requirements were increasing. She was placed on oral steroids, then trailed on 80mg TID solumedrol for 2 days and will be discharged on a prednisone taper. She was also diuresed with 2 days of 40mg IV lasix with appropriate UOP resulting, improvement on repeat CXR. She was medically stable and appropriate for DC home with home health on 1L continuous O2. She will be seen for close f/u and may be able to come off oxygen at that time.      She discontinued Tagrisso in April 2018. Restaging scans from 6/4/18 revealed "Stable appearance of a spiculated right upper lobe pulmonary lesion.  Additional irregular focus superior to the lesion in the right lung apex is stable and may represent scar or additional lesion; although this was not " "hypermetabolic on prior PET-CT.  No new pulmonary lesions. 1.3 cm subcarinal lymph node, not hypermetabolic on prior PET-CT. Stable postsurgical changes of a Whipple procedure. Bilateral nonobstructing nephrolithiasis.  Stable sclerotic focus in the T5 vertebral body, possibly a bone island as this was not hypermetabolic on prior PET-CT. Small hiatal hernia. RECIST SUMMARY: Date of prior examination for comparison 01/30/2018 Lesion 1: Right upper lobe 1.3 cm Series 2 image 31 prior measurement 1.3 cm". Bone scan also from 6/4/18 revealed no evidence of mets.     Her PET scan from 7/11/18 revealed "Right suprahilar lesion SUV max 3.76, previously 6.86. Pretracheal lymph node SUV max 2.55, previously 3.79. There is physiologic intracranial, head, and neck activity.  There is muscle activation.  There is vocal cord activation.  There is physiologic liver, spleen, GI and  activity.  There is a hiatal hernia.  Pelvic organs show nothing unusual.  No bone lesions are seen.  There are areas of benign appearing lung scar"  She notes fatigue.  She denies any nausea, vomiting, diarrhea, constipation, abdominal pain, weight loss or loss of appetite, chest pain, shortness of breath, leg swelling, fatigue, pain, headache, dizziness, or mood changes. Her ECOG PS is 2. She is accompanied by her  and son     She has been on Tarceva since 6/5/18. She has now completed SBRT to her right lung lesions.      HPI She comes in to review her MRI brain from 11/26/19 which reveals "Stable postsurgical changes of left frontal lobe mass resection.  No evidence of residual or recurrent malignancy. Right cerebellar encephalomalacia consistent with prior infarct. Mucosal thickening and layering fluid within the paranasal sinuses which may reflect acute sinusitis"  PET scan 11/26/19 reveals "New hypermetabolic pretracheal lymph node concerning for recurrent metastatic lung cancer. Two new small ground-glass lesions in the right upper lobe " "without corresponding increased radiotracer uptake, although likely too small to characterize with PET-CT.  Recommend attention on follow-up. Focal non physiologic uptake in the right hemicolon. Findings are nonspecific but could represent underlying polyp. Further evaluation could be performed with colonoscopy if clinically warranted"  She noted cough and some bronchitis in October 2019 which has since resolved. Notes fatigue.    Interval History:   - Repeat blood cultures remain positive for gram- rods. Speciation pending.  - Patient reports feeling better. Spiked a fever overnight although WBC is trending down.   - CT chest/abd/pelvis negative. No source identified.    Oncology Treatment Plan:   [No treatment plan]    Medications:  Continuous Infusions:   sodium bicarbonate drip 100 mL/hr at 01/18/20 1221     Scheduled Meds:   amitriptyline  50 mg Oral QHS    apixaban  5 mg Oral BID    dronabinol  5 mg Oral BID AC    levETIRAcetam  500 mg Oral BID    lipase-protease-amylase 12,000-38,000-60,000 units  1 capsule Oral TID WM    LORazepam  1 mg Oral QHS    piperacillin-tazobactam (ZOSYN) IVPB  4.5 g Intravenous Q8H    polyethylene glycol  17 g Oral Daily     PRN Meds:Dextrose 10% Bolus, Dextrose 10% Bolus, glucagon (human recombinant), glucose, glucose, ondansetron, prochlorperazine, sodium chloride 0.9%     Review of Systems   Constitutional: Positive for fatigue and fever. Negative for chills.   HENT: Negative for rhinorrhea and trouble swallowing.    Eyes: Negative for photophobia and visual disturbance.   Respiratory: Negative for cough and shortness of breath.    Cardiovascular: Positive for leg swelling. Negative for chest pain.   Gastrointestinal: Positive for nausea. Negative for abdominal distention, abdominal pain and diarrhea.   Genitourinary: Negative for difficulty urinating and dysuria.   Musculoskeletal: Negative for arthralgias and myalgias.   Allergic/Immunologic: Positive for " immunocompromised state.   Neurological: Negative for syncope and headaches.   Psychiatric/Behavioral: Negative for behavioral problems and confusion.     Objective:     Vital Signs (Most Recent):  Temp: 98.7 °F (37.1 °C) (01/18/20 1122)  Pulse: 89 (01/18/20 1122)  Resp: 18 (01/18/20 1122)  BP: (!) 122/58 (01/18/20 1122)  SpO2: 95 % (01/18/20 1122) Vital Signs (24h Range):  Temp:  [98.3 °F (36.8 °C)-101 °F (38.3 °C)] 98.7 °F (37.1 °C)  Pulse:  [] 89  Resp:  [17-20] 18  SpO2:  [93 %-100 %] 95 %  BP: (115-148)/(58-90) 122/58     Weight: 63.5 kg (140 lb)  Body mass index is 22.6 kg/m².  Body surface area is 1.72 meters squared.      Intake/Output Summary (Last 24 hours) at 1/18/2020 1306  Last data filed at 1/18/2020 0945  Gross per 24 hour   Intake 680 ml   Output 600 ml   Net 80 ml       Physical Exam   Constitutional: She is oriented to person, place, and time. She appears well-developed. No distress.   HENT:   Head: Normocephalic and atraumatic.   Eyes: EOM are normal.   Neck: Normal range of motion. Neck supple.   Cardiovascular: Normal rate and regular rhythm.   Pulmonary/Chest: Effort normal. No respiratory distress.   Abdominal: Soft. She exhibits no distension. There is no tenderness.   Musculoskeletal: She exhibits edema (1+ BLE up to knees). She exhibits no tenderness.   Neurological: She is alert and oriented to person, place, and time.   Skin: Skin is warm and dry.   Psychiatric: She has a normal mood and affect. Her behavior is normal.   Nursing note and vitals reviewed.      Significant Labs:   CBC:   Recent Labs   Lab 01/16/20 2050 01/17/20  0607 01/18/20  0437   WBC 5.31 21.24* 15.64*   HGB 8.9* 7.7* 8.1*   HCT 29.7* 25.8* 30.2*    179 158    and CMP:   Recent Labs   Lab 01/16/20 2050 01/17/20 0607 01/18/20  0437    147* 144   K 3.1* 3.4* 4.3   * 121* 122*   CO2 15* 16* 12*   GLU 73 88 79   BUN 22 24* 21   CREATININE 1.5* 1.7* 1.8*   CALCIUM 7.3* 6.9* 7.3*   PROT 4.9* 4.2*  4.8*   ALBUMIN 2.4* 2.0* 2.2*   BILITOT 2.0* 2.2* 2.5*   ALKPHOS 321* 222* 213*   AST 1,300* 634* 289*   * 275* 214*   ANIONGAP 9 10 10   EGFRNONAA 33.8* 29.1* 27.1*       Diagnostic Results:  I have reviewed and interpreted all pertinent imaging results/findings within the past 24 hours.    Assessment/Plan:     * Gram-negative bacteremia  Patient is a 74 year old woman with metastatic lung adenocarcinoma who presents with fevers. She was febrile up to 104.3F in the ED and tachycardic upon admission. Otherwise, other vitals were stable. She had a normal WBC.     Blood cultures growing gram negative rods. Unclear source as UA is negative, CXR non-revealing and CT chest-abd-pelvis is unremarkable. Possibly biliary? Pt is asymptomatic other than the fever which is now subsiding. WBC trending down.     Plan:  - Patient received 30mg/kg IVF in ED. First lactic acid WNL but second one elevated to 3.5. Repeat lactic acid is now down to normal.  - CXR showed no acute process, UA not suggestive of UTI. Flu was negative.  - Blood cultures growing Gram - rods. Follow speciation and sensitivities.   - Continue Zosyn for now. Discontinued vancomycin.   - Monitor fever, if not improving will consult ID.     Hyperchloremic metabolic acidosis  Pt noted to have worsening HCO3 with hyperchloremia. AG of 10.  Likely RTA in the setting of CKD +/- medication induced    - Urine elctrolytes  - Will replete with IV HCO23 infusion 1L for now.     Elevated LFTs  Patient's LFTs on admission were elevated (AST 1300,  from prior a month ago AST 39, ). She reports no new medication additions or changes. Likely related to sepsis vs possible liver mets    Plan:  - Abdominal US: Mild intrahepatic biliary ductal dilatation presumably secondary to patient's Whipple procedure.  - Acute hepatitis panel is negative. Unclear etiology but possible sepsis causing elevation in LFTs.  - Hold home atorvastatin for now  - Trend daily  CMP    History of deep vein thrombosis (DVT) of lower extremity  Continue home Eliquis 5mg BID    Insomnia  Continue home amitriptyline 50mg QHS    Dyslipidemia  Holding home atorvastatin in the setting of elevated LFTs. Resume when appropriate.    Debility  PT/OT consulted, appreciate assistance    CKD (chronic kidney disease) stage 3, GFR 30-59 ml/min  It appears from past year, Cr mostly ranged from 1.7-1.9. On admission, Cr 1.5.    - Monitor daily CMP  - Avoid nephrotoxic agents.         Hypomagnesemia  HYPOKALEMIA    Replete electrolytes PRN    Malignant neoplasm of lower lobe of right lung  Patient is a 74 year old woman with metastatic lung adenocarcinoma on Xgeva and Tarceva who follows with Dr. Vazquez in clinic.  - Per pharmacy, only staff can order Tarceva  - Will need outpatient follow up with Dr. Vazquez upon discharge    Meningioma  Patient is s/p resection in 2016  Has been on keppra as seizure prophylaxis since - continue home keppra 500mg BID    Essential hypertension  Hold home losartan and metoprolol in the setting of sepsis. Resume when appropriate.             Daryl Lara MD  Hematology/Oncology  [facility]    Attending Note  I have personally taken the history and examined this patient and agree with the resident's note as stated above.  GN sepsis  Wbc trending down as is fever curve   Await sensitivities

## 2020-01-19 LAB
ALBUMIN SERPL BCP-MCNC: 2 G/DL (ref 3.5–5.2)
ALP SERPL-CCNC: 186 U/L (ref 55–135)
ALT SERPL W/O P-5'-P-CCNC: 143 U/L (ref 10–44)
ANION GAP SERPL CALC-SCNC: 6 MMOL/L (ref 8–16)
AST SERPL-CCNC: 129 U/L (ref 10–40)
BACTERIA BLD CULT: ABNORMAL
BASOPHILS # BLD AUTO: 0.03 K/UL (ref 0–0.2)
BASOPHILS NFR BLD: 0.3 % (ref 0–1.9)
BILIRUB SERPL-MCNC: 1.2 MG/DL (ref 0.1–1)
BUN SERPL-MCNC: 17 MG/DL (ref 8–23)
CALCIUM SERPL-MCNC: 6.7 MG/DL (ref 8.7–10.5)
CHLORIDE SERPL-SCNC: 116 MMOL/L (ref 95–110)
CO2 SERPL-SCNC: 22 MMOL/L (ref 23–29)
CREAT SERPL-MCNC: 1.6 MG/DL (ref 0.5–1.4)
DIFFERENTIAL METHOD: ABNORMAL
EOSINOPHIL # BLD AUTO: 0.2 K/UL (ref 0–0.5)
EOSINOPHIL NFR BLD: 2 % (ref 0–8)
ERYTHROCYTE [DISTWIDTH] IN BLOOD BY AUTOMATED COUNT: 15.6 % (ref 11.5–14.5)
EST. GFR  (AFRICAN AMERICAN): 36.1 ML/MIN/1.73 M^2
EST. GFR  (NON AFRICAN AMERICAN): 31.3 ML/MIN/1.73 M^2
GLUCOSE SERPL-MCNC: 86 MG/DL (ref 70–110)
HCT VFR BLD AUTO: 26.6 % (ref 37–48.5)
HGB BLD-MCNC: 7.8 G/DL (ref 12–16)
IMM GRANULOCYTES # BLD AUTO: 0.1 K/UL (ref 0–0.04)
IMM GRANULOCYTES NFR BLD AUTO: 0.9 % (ref 0–0.5)
LYMPHOCYTES # BLD AUTO: 1.1 K/UL (ref 1–4.8)
LYMPHOCYTES NFR BLD: 10.7 % (ref 18–48)
MAGNESIUM SERPL-MCNC: 1.8 MG/DL (ref 1.6–2.6)
MCH RBC QN AUTO: 27 PG (ref 27–31)
MCHC RBC AUTO-ENTMCNC: 29.3 G/DL (ref 32–36)
MCV RBC AUTO: 92 FL (ref 82–98)
MONOCYTES # BLD AUTO: 1 K/UL (ref 0.3–1)
MONOCYTES NFR BLD: 9.6 % (ref 4–15)
NEUTROPHILS # BLD AUTO: 8.1 K/UL (ref 1.8–7.7)
NEUTROPHILS NFR BLD: 76.5 % (ref 38–73)
NRBC BLD-RTO: 0 /100 WBC
PHOSPHATE SERPL-MCNC: 2.2 MG/DL (ref 2.7–4.5)
PLATELET # BLD AUTO: 174 K/UL (ref 150–350)
PMV BLD AUTO: 11.5 FL (ref 9.2–12.9)
POTASSIUM SERPL-SCNC: 3.5 MMOL/L (ref 3.5–5.1)
PROT SERPL-MCNC: 4.6 G/DL (ref 6–8.4)
RBC # BLD AUTO: 2.89 M/UL (ref 4–5.4)
SODIUM SERPL-SCNC: 144 MMOL/L (ref 136–145)
WBC # BLD AUTO: 10.58 K/UL (ref 3.9–12.7)

## 2020-01-19 PROCEDURE — 63600175 PHARM REV CODE 636 W HCPCS: Mod: HCNC | Performed by: STUDENT IN AN ORGANIZED HEALTH CARE EDUCATION/TRAINING PROGRAM

## 2020-01-19 PROCEDURE — 25000003 PHARM REV CODE 250: Mod: HCNC | Performed by: INTERNAL MEDICINE

## 2020-01-19 PROCEDURE — 84100 ASSAY OF PHOSPHORUS: CPT | Mod: HCNC

## 2020-01-19 PROCEDURE — 20600001 HC STEP DOWN PRIVATE ROOM: Mod: HCNC

## 2020-01-19 PROCEDURE — 36415 COLL VENOUS BLD VENIPUNCTURE: CPT | Mod: HCNC

## 2020-01-19 PROCEDURE — 87040 BLOOD CULTURE FOR BACTERIA: CPT | Mod: 59,HCNC

## 2020-01-19 PROCEDURE — 83735 ASSAY OF MAGNESIUM: CPT | Mod: HCNC

## 2020-01-19 PROCEDURE — 80053 COMPREHEN METABOLIC PANEL: CPT | Mod: HCNC

## 2020-01-19 PROCEDURE — 99233 SBSQ HOSP IP/OBS HIGH 50: CPT | Mod: HCNC,GC,, | Performed by: INTERNAL MEDICINE

## 2020-01-19 PROCEDURE — 99233 PR SUBSEQUENT HOSPITAL CARE,LEVL III: ICD-10-PCS | Mod: HCNC,GC,, | Performed by: INTERNAL MEDICINE

## 2020-01-19 PROCEDURE — 25000003 PHARM REV CODE 250: Mod: HCNC | Performed by: STUDENT IN AN ORGANIZED HEALTH CARE EDUCATION/TRAINING PROGRAM

## 2020-01-19 PROCEDURE — 85025 COMPLETE CBC W/AUTO DIFF WBC: CPT | Mod: HCNC

## 2020-01-19 RX ORDER — CALCIUM CARBONATE 200(500)MG
500 TABLET,CHEWABLE ORAL ONCE
Status: DISCONTINUED | OUTPATIENT
Start: 2020-01-19 | End: 2020-01-19

## 2020-01-19 RX ORDER — CALCIUM CARBONATE 500(1250)
1000 TABLET ORAL ONCE
Status: DISCONTINUED | OUTPATIENT
Start: 2020-01-19 | End: 2020-01-19

## 2020-01-19 RX ORDER — CALCIUM CARBONATE 200(500)MG
1000 TABLET,CHEWABLE ORAL ONCE
Status: COMPLETED | OUTPATIENT
Start: 2020-01-19 | End: 2020-01-19

## 2020-01-19 RX ADMIN — LORAZEPAM 1 MG: 1 TABLET ORAL at 09:01

## 2020-01-19 RX ADMIN — POLYETHYLENE GLYCOL 3350 17 G: 17 POWDER, FOR SOLUTION ORAL at 08:01

## 2020-01-19 RX ADMIN — PIPERACILLIN SODIUM,TAZOBACTAM SODIUM 4.5 G: 4; .5 INJECTION, POWDER, FOR SOLUTION INTRAVENOUS at 04:01

## 2020-01-19 RX ADMIN — PIPERACILLIN SODIUM,TAZOBACTAM SODIUM 4.5 G: 4; .5 INJECTION, POWDER, FOR SOLUTION INTRAVENOUS at 09:01

## 2020-01-19 RX ADMIN — LEVETIRACETAM 500 MG: 500 TABLET ORAL at 08:01

## 2020-01-19 RX ADMIN — AMITRIPTYLINE HYDROCHLORIDE 50 MG: 50 TABLET, FILM COATED ORAL at 09:01

## 2020-01-19 RX ADMIN — LEVETIRACETAM 500 MG: 500 TABLET ORAL at 09:01

## 2020-01-19 RX ADMIN — PANCRELIPASE 1 CAPSULE: 60000; 12000; 38000 CAPSULE, DELAYED RELEASE PELLETS ORAL at 12:01

## 2020-01-19 RX ADMIN — PANCRELIPASE 1 CAPSULE: 60000; 12000; 38000 CAPSULE, DELAYED RELEASE PELLETS ORAL at 08:01

## 2020-01-19 RX ADMIN — APIXABAN 5 MG: 5 TABLET, FILM COATED ORAL at 08:01

## 2020-01-19 RX ADMIN — APIXABAN 5 MG: 5 TABLET, FILM COATED ORAL at 09:01

## 2020-01-19 RX ADMIN — DRONABINOL 5 MG: 2.5 CAPSULE ORAL at 06:01

## 2020-01-19 RX ADMIN — CALCIUM CARBONATE (ANTACID) CHEW TAB 500 MG 1000 MG: 500 CHEW TAB at 12:01

## 2020-01-19 RX ADMIN — PANCRELIPASE 1 CAPSULE: 60000; 12000; 38000 CAPSULE, DELAYED RELEASE PELLETS ORAL at 05:01

## 2020-01-19 RX ADMIN — DIBASIC SODIUM PHOSPHATE, MONOBASIC POTASSIUM PHOSPHATE AND MONOBASIC SODIUM PHOSPHATE 2 TABLET: 852; 155; 130 TABLET ORAL at 09:01

## 2020-01-19 NOTE — ASSESSMENT & PLAN NOTE
It appears from past year, Cr mostly ranged from 1.7-1.9. On admission, Cr 1.5.    - Monitor daily CMP. Cr stable today  - Avoid nephrotoxic agents.

## 2020-01-19 NOTE — PLAN OF CARE
Pt involved in plan of care and communicating needs throughout shift. Pt afebrile throughout shift; Zosyn administered. Up in room and to bathroom with stand-by assist; no c/o pain. Electrolytes replaced as ordered. Tolerating diet, voiding without difficulty. Telemetry maintained; sinus tachycardia and normal sinus rhythm noted. Pt remaining free from falls or injury throughout shift; bed locked and in lowest position; call light within reach.  Pt instructed to call for assistance as needed.  Q1H rounding done on pt.

## 2020-01-19 NOTE — HOSPITAL COURSE
Pt with R lung adenocarcinoma admitted with sepsis. Started on vanc zosyn.  Blood cultures positive for Klebsiella. Negative culture on 01/19, last +ve culture 01/17.   No source was identified. UA, CXR unremarkable, CT chest/'abdomen/pelvis with no acute findings.   WBC trending down and patient reports feeling better. Afebrile > 48h.     Bacteremia due to Klebsiella pneumoniae: Patient is a 74 year old woman with metastatic lung adenocarcinoma who presents with fevers. She was febrile up to 104.3F in the ED and tachycardic upon admission. Otherwise, other vitals were stable. She had a normal WBC.  Blood cultures +ve for Klebsiella pneumonia. Pansensitive. Zosyn switched to Ciprofloxacin 400 mg IV BID. Cultures from 1/19 negative for >48 hours prior to discharge. Patiene will be discharged with 12 days of Ciprofloxacin 750mg BID to complete 14day treatment since negative culltures.    Elevated LFTs  Abdominal US: Mild intrahepatic biliary ductal dilatation presumably secondary to patient's Whipple procedure. Acute hepatitis panel is negative. Likely DILI. Held home atorvastatin. Daily CMP improved on dc     History of deep vein thrombosis (DVT) of lower extremity  Continue home Eliquis 5mg BID     Insomnia  Continue home amitriptyline 50mg QHS     Dyslipidemia  Holding home atorvastatin in the setting of elevated LFTs. Resume when LFTs normalize in as out patient.     Debility  Home health orders signed     Malignant neoplasm of lower lobe of right lung  Patient will hold her anti cancer treatment until resolution of infection and follow up with Dr. Vazquez.    Physical Exam   Constitutional: She is oriented to person, place, and time. She appears well-developed. No distress.   HENT:   Head: Normocephalic and atraumatic.   Eyes: EOM are normal.   Neck: Normal range of motion. Neck supple.   Cardiovascular: Normal rate and regular rhythm.   Pulmonary/Chest: Effort normal. No respiratory distress.   Abdominal: Soft.  She exhibits no distension. There is no tenderness.   Pt refused rectal exam.    Musculoskeletal: She exhibits edema (1+ BLE up to knees). She exhibits no tenderness.   Neurological: She is alert and oriented to person, place, and time.   Skin: Skin is warm and dry.   Psychiatric: She has a normal mood and affect. Her behavior is normal.   Nursing note and vitals reviewed.

## 2020-01-19 NOTE — SUBJECTIVE & OBJECTIVE
Interval History:  - Seen this morning in her room, reports feeling much better.   - Still spiking fevers overnight , however WBC is trending down (10 from 15).   - Blood cultures still pending speciation of gram - rods.     Oncology Treatment Plan:   [No treatment plan]    Medications:  Continuous Infusions:  Scheduled Meds:   amitriptyline  50 mg Oral QHS    apixaban  5 mg Oral BID    calcium carbonate  1,000 mg Oral Once    dronabinol  5 mg Oral BID AC    levETIRAcetam  500 mg Oral BID    lipase-protease-amylase 12,000-38,000-60,000 units  1 capsule Oral TID WM    LORazepam  1 mg Oral QHS    piperacillin-tazobactam (ZOSYN) IVPB  4.5 g Intravenous Q8H    polyethylene glycol  17 g Oral Daily     PRN Meds:Dextrose 10% Bolus, Dextrose 10% Bolus, glucagon (human recombinant), glucose, glucose, ondansetron, prochlorperazine, sodium chloride 0.9%     Review of Systems   Constitutional: Positive for fever. Negative for chills and fatigue.   HENT: Negative for rhinorrhea and trouble swallowing.    Eyes: Negative for photophobia and visual disturbance.   Respiratory: Negative for cough and shortness of breath.    Cardiovascular: Negative for chest pain and leg swelling.   Gastrointestinal: Negative for abdominal distention, abdominal pain, diarrhea and nausea.   Genitourinary: Negative for difficulty urinating and dysuria.   Musculoskeletal: Negative for arthralgias and myalgias.   Allergic/Immunologic: Positive for immunocompromised state.   Neurological: Negative for syncope and headaches.   Psychiatric/Behavioral: Negative for behavioral problems and confusion.     Objective:     Vital Signs (Most Recent):  Temp: 98.6 °F (37 °C) (01/19/20 0743)  Pulse: 105 (01/19/20 0743)  Resp: 18 (01/19/20 0743)  BP: 131/75 (01/19/20 0743)  SpO2: 95 % (01/19/20 0743) Vital Signs (24h Range):  Temp:  [98.2 °F (36.8 °C)-100.8 °F (38.2 °C)] 98.6 °F (37 °C)  Pulse:  [] 105  Resp:  [16-18] 18  SpO2:  [95 %-98 %] 95 %  BP:  (131-162)/(62-75) 131/75     Weight: 63.5 kg (140 lb)  Body mass index is 22.6 kg/m².  Body surface area is 1.72 meters squared.      Intake/Output Summary (Last 24 hours) at 1/19/2020 1125  Last data filed at 1/19/2020 0743  Gross per 24 hour   Intake 1660.83 ml   Output 1850 ml   Net -189.17 ml       Physical Exam   Constitutional: She is oriented to person, place, and time. She appears well-developed. No distress.   HENT:   Head: Normocephalic and atraumatic.   Eyes: EOM are normal.   Neck: Normal range of motion. Neck supple.   Cardiovascular: Normal rate and regular rhythm.   Pulmonary/Chest: Effort normal. No respiratory distress.   Abdominal: Soft. She exhibits no distension. There is no tenderness.   Musculoskeletal: She exhibits edema (1+ BLE up to knees). She exhibits no tenderness.   Neurological: She is alert and oriented to person, place, and time.   Skin: Skin is warm and dry.   Psychiatric: She has a normal mood and affect. Her behavior is normal.   Nursing note and vitals reviewed.      Significant Labs:   CBC:   Recent Labs   Lab 01/18/20  0437 01/19/20  0526   WBC 15.64* 10.58   HGB 8.1* 7.8*   HCT 30.2* 26.6*    174    and CMP:   Recent Labs   Lab 01/18/20  0437 01/19/20  0526    144   K 4.3 3.5   * 116*   CO2 12* 22*   GLU 79 86   BUN 21 17   CREATININE 1.8* 1.6*   CALCIUM 7.3* 6.7*   PROT 4.8* 4.6*   ALBUMIN 2.2* 2.0*   BILITOT 2.5* 1.2*   ALKPHOS 213* 186*   * 129*   * 143*   ANIONGAP 10 6*   EGFRNONAA 27.1* 31.3*       Diagnostic Results:  I have reviewed and interpreted all pertinent imaging results/findings within the past 24 hours.

## 2020-01-19 NOTE — ASSESSMENT & PLAN NOTE
Pt reports being on calcium tabs at home, possibly secondary to CKD.   Ca today 6.7--> Corrected 8.3  - Calcium 1000 mg PO once today.  - Obtain vit D levels in AM.

## 2020-01-19 NOTE — PLAN OF CARE
Patient is oriented X4. Ambulates to bathroom with minimal assist. PIV to right FA infusing maintenance fluids. No complaints of pain or discomfort at this time. ABT continued. Plan of care reviewed with patient and family. All safety precautions in place. Remains free of injury. Patient is stable. Will continue to monitor.

## 2020-01-19 NOTE — PROGRESS NOTES
"[facility]  Hematology/Oncology  Progress Note    Patient Name: Naty St  Admission Date: 1/16/2020  Hospital Length of Stay: 3 days  Code Status: Prior     Subjective:     HPI:  Ms. St is a 74 year old woman with metastatic R lung adenocarcinoma on Xgeva and Tarceva, breast ca s/p L lumpectomy, meningioma s/p resection, pancreatic ca s/p whipple, and s/p hysterectomy w/ b/l salpingo-oophorectomy, HTN who presents with fevers. She follows with Dr. Vazquez in clinic. Patient says that this past Saturday, she felt as if the "room was spinning", and this stopped on its own. She has experienced this before. She says she has been feeling "hot" for the past week. Her family ( and son) at bedside say that she has been having chills, patient feels weak and that she has looked pale. Patient says that she has been having nausea, vomiting and dry cough. She also says she has been having a runny nose, headaches, and bilateral lower extremity swelling (that has been chronic). She denies diarrhea, dysuria, new rashes, muscle aches. She states that she had the flu shot this year. She denies changes in medications or new additions with the exception of Tarsiva being changed to the generic brand. She denies being around recent sick contacts or travel.     Patient is still receiving Xgeva (about once every 6 weeks, she believes last received around 12/12/19). She reports that Tagrisso was stopped.     ED course: Patient was febrile to 104.3F as well as tachycardic, and labs were notable for elevated LFTs. UA was not suggestive of UTI, and CXR did not show an acute process. He procal was elevated to 2.52. Flu was negative. Her initial lactic acid was WNL but the second one was elevated to 3.5. She received IVF, electrolyte replacement and a dose of zosyn. Patient states that she feels much better, and her family notes that she looks improved since initially presenting to the ED.    Oncologic " "History (per Dr. Vazquez's note 12/4/2019):  Ms. Naty St was admitted to the hospital between 01/25/2016 and 01/28/2016. She has a history of pancreatic cancer 17 years ago, breast cancer and meningioma, presented to the hospital complaining of loss of consciousness. The patient apparently was in her normal state of health and she stood up to go to the bathroom and fell to the floor. The patient's daughter helped the mother up in the bathroom and noted that her mother's upper extremities were shaking and eye rolling. No reports of bowel or bladder incontinence, tongue biting or rolling. The second episode lasted about four minutes and she was extremely lethargic following that. Apparently, the patient had a meningioma resection done in the past; however, recently, underwent neurosurgical resection with Dr. 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    Additional immunohistochemical stains were performed which revealed the tumor cells to be are positive for TTF1 and negative for ER and GCDFP. The morphology and TTF1 positivity are most consistent with lung primary.    Of note, imaging scan at the end of January 2016 revealed a mass in the lung at 2 cm in the medial aspect of the apical segment of the right upper lobe abutting the mediastinum at the level of the azygous vein and abutting and possibly encasing the segmental bronchi and vessels of the apical segment of the right upper lobe and no pleural fluid was present. Also, there is an enlarged right paratracheal lymph node. No evidence of any metastatic disease at the pancreatic site with postoperative changes post Whipple disease  Her PET Scan from 2/15/16 reveal "Hypermetabolic mass in the right lung apex consistent with a primary malignancy. Hypermetabolic mediastinal lymph nodes consistent with metastatic disease. Right sacral hypermetabolic " "lesion consistent with metastatic disease, noting additional mildly sclerotic lesions in multiple vertebral bodies which do not demonstrate abnormal hypermetabolism  She underwent IR bone biopsy which revealed metastatic adenocarcinoma of lung origin. 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  She has completed focal RT to brain lesions in March 2016.    PET scan from 6/6/16 shows "Dramatic almost complete response to therapy."  Lovenox started after US lower ext 6/7/16 shows Acute complete occlusion of one of the left posterior tibial vein.Remote partial thrombus of the proximal left superficial femoral vein.  She is on Tarceva.   12/6/16 PET scan reveals "Stable right upper lobe lesion and right hilar lymph node. No new lesions identified. Trace left pleural effusion".  1/27/17 Renal u/s "Medical renal disease. Nonobstructive right nephrolithiasis"  1/31/17 PET - "Right upper lobe nodule and right hilar lymph node similar and very low grade activity.  There is no definite evidence of recurrence."   11/9/17 PET "In this patient with history of lung cancer, there is interval increase in size and hypermetabolism of a right upper lobe lung lesion concerning for recurrent disease. Additionally, there is interval appearance of a hypermetabolic paratracheal lymph node suspicious for metastatic disease"     She progressed on Tarceva She underwent EBUS on 12/5/17. Pathology revealed adenocarcinoma but T790m could not be done as quantity was not insufficient. Her PET scan from 1/30/18 revealed The patient's previously identified abnormal lesions is slightly greater uptake of FDG on today's study compared with prior exam. These findings are concerning for progression of disease"     She was hospitalized between 4/9/18-4/16/18. Her hospital course was as follows "Patient was admitted to hemonc service on 4/9 with acute hypoxic respiratory failure. She was requiring 4 L of nc on admission. She was started on moxi for CAP. She " "received one dose of ceftriaxone in the ED. On 2nd day of admission she spiked a fever. Her Hb was ~7 g/dl so she was transfused 1 unit of PRBCs. Over night she developed worsening of her symptoms with increased O2 requirements to 15 L HFNC. Her CXR showed worsening congestion. There was a concern of TRALI vs TACO. New 2D echo with diastolic dysfunction. She was given IV lasix pushes as needed and was started on dexamethasone.  Her abx was escalated to vanc and cefepime. She improved with diuresis and steroids. Pulmonary were consulted. Her O2 requirements continued to improve. On 4/15 she was switched to Augmentin. PT recommended discharging her to SNF but the patient refused and she wanted to go home with home health. She qualified for home oxygen so she was discharged on 3 L nc while at rest and 6 while active. Augmentin and dexamethasone were discontinued on discharge"     She was readmitted from 4/27 to 5/3/18 with who presented to the ED with a complaint of fatigue and worsening DELA CRUZ. Pt diagnosed PNA 4/9 and was discharged on 4/16 on 3L oxygen. She was initially placed on cefepime for 3 days followed by an add'l 2 days of Augmentin. Pulm was consulted with assistance as her oxygen requirements were increasing. She was placed on oral steroids, then trailed on 80mg TID solumedrol for 2 days and will be discharged on a prednisone taper. She was also diuresed with 2 days of 40mg IV lasix with appropriate UOP resulting, improvement on repeat CXR. She was medically stable and appropriate for DC home with home health on 1L continuous O2. She will be seen for close f/u and may be able to come off oxygen at that time.      She discontinued Tagrisso in April 2018. Restaging scans from 6/4/18 revealed "Stable appearance of a spiculated right upper lobe pulmonary lesion.  Additional irregular focus superior to the lesion in the right lung apex is stable and may represent scar or additional lesion; although this was not " "hypermetabolic on prior PET-CT.  No new pulmonary lesions. 1.3 cm subcarinal lymph node, not hypermetabolic on prior PET-CT. Stable postsurgical changes of a Whipple procedure. Bilateral nonobstructing nephrolithiasis.  Stable sclerotic focus in the T5 vertebral body, possibly a bone island as this was not hypermetabolic on prior PET-CT. Small hiatal hernia. RECIST SUMMARY: Date of prior examination for comparison 01/30/2018 Lesion 1: Right upper lobe 1.3 cm Series 2 image 31 prior measurement 1.3 cm". Bone scan also from 6/4/18 revealed no evidence of mets.     Her PET scan from 7/11/18 revealed "Right suprahilar lesion SUV max 3.76, previously 6.86. Pretracheal lymph node SUV max 2.55, previously 3.79. There is physiologic intracranial, head, and neck activity.  There is muscle activation.  There is vocal cord activation.  There is physiologic liver, spleen, GI and  activity.  There is a hiatal hernia.  Pelvic organs show nothing unusual.  No bone lesions are seen.  There are areas of benign appearing lung scar"  She notes fatigue.  She denies any nausea, vomiting, diarrhea, constipation, abdominal pain, weight loss or loss of appetite, chest pain, shortness of breath, leg swelling, fatigue, pain, headache, dizziness, or mood changes. Her ECOG PS is 2. She is accompanied by her  and son     She has been on Tarceva since 6/5/18. She has now completed SBRT to her right lung lesions.      HPI She comes in to review her MRI brain from 11/26/19 which reveals "Stable postsurgical changes of left frontal lobe mass resection.  No evidence of residual or recurrent malignancy. Right cerebellar encephalomalacia consistent with prior infarct. Mucosal thickening and layering fluid within the paranasal sinuses which may reflect acute sinusitis"  PET scan 11/26/19 reveals "New hypermetabolic pretracheal lymph node concerning for recurrent metastatic lung cancer. Two new small ground-glass lesions in the right upper lobe " "without corresponding increased radiotracer uptake, although likely too small to characterize with PET-CT.  Recommend attention on follow-up. Focal non physiologic uptake in the right hemicolon. Findings are nonspecific but could represent underlying polyp. Further evaluation could be performed with colonoscopy if clinically warranted"  She noted cough and some bronchitis in October 2019 which has since resolved. Notes fatigue.    Interval History:  - Seen this morning in her room, reports feeling much better.   - Still spiking fevers overnight , however WBC is trending down (10 from 15).   - Blood cultures still pending speciation of gram - rods.     Oncology Treatment Plan:   [No treatment plan]    Medications:  Continuous Infusions:  Scheduled Meds:   amitriptyline  50 mg Oral QHS    apixaban  5 mg Oral BID    calcium carbonate  1,000 mg Oral Once    dronabinol  5 mg Oral BID AC    levETIRAcetam  500 mg Oral BID    lipase-protease-amylase 12,000-38,000-60,000 units  1 capsule Oral TID WM    LORazepam  1 mg Oral QHS    piperacillin-tazobactam (ZOSYN) IVPB  4.5 g Intravenous Q8H    polyethylene glycol  17 g Oral Daily     PRN Meds:Dextrose 10% Bolus, Dextrose 10% Bolus, glucagon (human recombinant), glucose, glucose, ondansetron, prochlorperazine, sodium chloride 0.9%     Review of Systems   Constitutional: Positive for fever. Negative for chills and fatigue.   HENT: Negative for rhinorrhea and trouble swallowing.    Eyes: Negative for photophobia and visual disturbance.   Respiratory: Negative for cough and shortness of breath.    Cardiovascular: Negative for chest pain and leg swelling.   Gastrointestinal: Negative for abdominal distention, abdominal pain, diarrhea and nausea.   Genitourinary: Negative for difficulty urinating and dysuria.   Musculoskeletal: Negative for arthralgias and myalgias.   Allergic/Immunologic: Positive for immunocompromised state.   Neurological: Negative for syncope and " headaches.   Psychiatric/Behavioral: Negative for behavioral problems and confusion.     Objective:     Vital Signs (Most Recent):  Temp: 98.6 °F (37 °C) (01/19/20 0743)  Pulse: 105 (01/19/20 0743)  Resp: 18 (01/19/20 0743)  BP: 131/75 (01/19/20 0743)  SpO2: 95 % (01/19/20 0743) Vital Signs (24h Range):  Temp:  [98.2 °F (36.8 °C)-100.8 °F (38.2 °C)] 98.6 °F (37 °C)  Pulse:  [] 105  Resp:  [16-18] 18  SpO2:  [95 %-98 %] 95 %  BP: (131-162)/(62-75) 131/75     Weight: 63.5 kg (140 lb)  Body mass index is 22.6 kg/m².  Body surface area is 1.72 meters squared.      Intake/Output Summary (Last 24 hours) at 1/19/2020 1125  Last data filed at 1/19/2020 0743  Gross per 24 hour   Intake 1660.83 ml   Output 1850 ml   Net -189.17 ml       Physical Exam   Constitutional: She is oriented to person, place, and time. She appears well-developed. No distress.   HENT:   Head: Normocephalic and atraumatic.   Eyes: EOM are normal.   Neck: Normal range of motion. Neck supple.   Cardiovascular: Normal rate and regular rhythm.   Pulmonary/Chest: Effort normal. No respiratory distress.   Abdominal: Soft. She exhibits no distension. There is no tenderness.   Musculoskeletal: She exhibits edema (1+ BLE up to knees). She exhibits no tenderness.   Neurological: She is alert and oriented to person, place, and time.   Skin: Skin is warm and dry.   Psychiatric: She has a normal mood and affect. Her behavior is normal.   Nursing note and vitals reviewed.      Significant Labs:   CBC:   Recent Labs   Lab 01/18/20 0437 01/19/20  0526   WBC 15.64* 10.58   HGB 8.1* 7.8*   HCT 30.2* 26.6*    174    and CMP:   Recent Labs   Lab 01/18/20 0437 01/19/20  0526    144   K 4.3 3.5   * 116*   CO2 12* 22*   GLU 79 86   BUN 21 17   CREATININE 1.8* 1.6*   CALCIUM 7.3* 6.7*   PROT 4.8* 4.6*   ALBUMIN 2.2* 2.0*   BILITOT 2.5* 1.2*   ALKPHOS 213* 186*   * 129*   * 143*   ANIONGAP 10 6*   EGFRNONAA 27.1* 31.3*       Diagnostic  Results:  I have reviewed and interpreted all pertinent imaging results/findings within the past 24 hours.    Assessment/Plan:     * Gram-negative bacteremia  Patient is a 74 year old woman with metastatic lung adenocarcinoma who presents with fevers. She was febrile up to 104.3F in the ED and tachycardic upon admission. Otherwise, other vitals were stable. She had a normal WBC.     Blood cultures growing gram negative rods. Unclear source as UA is negative, CXR non-revealing and CT chest-abd-pelvis is unremarkable. Possibly biliary? Pt is asymptomatic other than the fever which is now subsiding. WBC trending down.     Plan:  - Patient received 30mg/kg IVF in ED. First lactic acid WNL but second one elevated to 3.5. Repeat lactic acid is now down to normal.  - CXR showed no acute process, UA not suggestive of UTI. Flu was negative.  - Blood cultures growing Gram - rods. Pending speciation and sensitivities.   - Continue Zosyn for now. Discontinued vancomycin.   - Monitor fever, if not improving will consult ID.     Hyperchloremic metabolic acidosis  Pt noted to have worsening HCO3 with hyperchloremia. AG of 10.  Likely RTA in the setting of CKD +/- medication induced    - Urine elctrolytes- Unreliable UAG in the setting of CKD  - received 1 L of Bicarb on 1/18. HCO3 improved to 22 today.   - Pt reports being on sodium bicarb tabs at home but they were discontinued last year.   - Monitor HCO3 and if still trending down will resume sodium bicarb tabs.     Elevated LFTs  Patient's LFTs on admission were elevated (AST 1300,  from prior a month ago AST 39, ). She reports no new medication additions or changes. Likely related to sepsis vs possible liver mets    Plan:  - Abdominal US: Mild intrahepatic biliary ductal dilatation presumably secondary to patient's Whipple procedure.  - Acute hepatitis panel is negative. Unclear etiology but possible sepsis causing elevation in LFTs.  - Hold home atorvastatin for  now  - Trend daily CMP    History of deep vein thrombosis (DVT) of lower extremity  Continue home Eliquis 5mg BID    Insomnia  Continue home amitriptyline 50mg QHS    Dyslipidemia  Holding home atorvastatin in the setting of elevated LFTs. Resume when appropriate.    Debility  PT/OT consulted, appreciate assistance    CKD (chronic kidney disease) stage 3, GFR 30-59 ml/min  It appears from past year, Cr mostly ranged from 1.7-1.9. On admission, Cr 1.5.    - Monitor daily CMP. Cr stable today  - Avoid nephrotoxic agents.         Hypomagnesemia      Replete electrolytes PRN    Hypocalcemia  Pt reports being on calcium tabs at home, possibly secondary to CKD.   Ca today 6.7--> Corrected 8.3  - Calcium 1000 mg PO once today.  - Obtain vit D levels in AM.    Malignant neoplasm of lower lobe of right lung  Patient is a 74 year old woman with metastatic lung adenocarcinoma on Xgeva and Tarceva who follows with Dr. Vazquez in clinic.  - Per pharmacy, only staff can order Tarceva  - Will need outpatient follow up with Dr. aVzquez upon discharge    Meningioma  Patient is s/p resection in 2016  Has been on keppra as seizure prophylaxis since - continue home keppra 500mg BID    Essential hypertension  Hold home losartan and metoprolol in the setting of sepsis. Resume when appropriate.    Daryl Lara MD  Hematology/Oncology  [facility]      Attending Note  I have personally taken the history and examined this patient and agree with the resident's note as stated above.  Continue tx for GN sepsis- await ID and sensitivities to determine duration and final abx plan  She clinically is improving, fever curve improved and wbc trending down

## 2020-01-19 NOTE — ASSESSMENT & PLAN NOTE
Pt noted to have worsening HCO3 with hyperchloremia. AG of 10.  Likely RTA in the setting of CKD +/- medication induced    - Urine elctrolytes- Unreliable UAG in the setting of CKD  - received 1 L of Bicarb on 1/18. HCO3 improved to 22 today.   - Pt reports being on sodium bicarb tabs at home but they were discontinued last year.   - Monitor HCO3 and if still trending down will resume sodium bicarb tabs.

## 2020-01-19 NOTE — PLAN OF CARE
Pt involved in plan of care and communicating needs throughout shift. Pt afebrile throughout shift; Zosyn administered. Up in room and to bathroom with stand-by assist; no c/o pain. Continuous sodium bicarb @ 75 ml/her. Urine samples collected as ordered. Tolerating diet, voiding without difficulty. Telemetry maintained; sinus tachycardia and normal sinus rhythm noted. Pt remaining free from falls or injury throughout shift; bed locked and in lowest position; call light within reach.  Pt instructed to call for assistance as needed.  Q1H rounding done on pt.

## 2020-01-19 NOTE — ASSESSMENT & PLAN NOTE
Patient is a 74 year old woman with metastatic lung adenocarcinoma who presents with fevers. She was febrile up to 104.3F in the ED and tachycardic upon admission. Otherwise, other vitals were stable. She had a normal WBC.     Blood cultures growing gram negative rods. Unclear source as UA is negative, CXR non-revealing and CT chest-abd-pelvis is unremarkable. Possibly biliary? Pt is asymptomatic other than the fever which is now subsiding. WBC trending down.     Plan:  - Patient received 30mg/kg IVF in ED. First lactic acid WNL but second one elevated to 3.5. Repeat lactic acid is now down to normal.  - CXR showed no acute process, UA not suggestive of UTI. Flu was negative.  - Blood cultures growing Gram - rods. Pending speciation and sensitivities.   - Continue Zosyn for now. Discontinued vancomycin.   - Monitor fever, if not improving will consult ID.

## 2020-01-20 LAB
25(OH)D3+25(OH)D2 SERPL-MCNC: 19 NG/ML (ref 30–96)
ALBUMIN SERPL BCP-MCNC: 1.9 G/DL (ref 3.5–5.2)
ALP SERPL-CCNC: 178 U/L (ref 55–135)
ALT SERPL W/O P-5'-P-CCNC: 104 U/L (ref 10–44)
ANION GAP SERPL CALC-SCNC: 6 MMOL/L (ref 8–16)
AST SERPL-CCNC: 70 U/L (ref 10–40)
BASOPHILS # BLD AUTO: 0.02 K/UL (ref 0–0.2)
BASOPHILS NFR BLD: 0.3 % (ref 0–1.9)
BILIRUB SERPL-MCNC: 1 MG/DL (ref 0.1–1)
BUN SERPL-MCNC: 16 MG/DL (ref 8–23)
CALCIUM SERPL-MCNC: 6.6 MG/DL (ref 8.7–10.5)
CHLORIDE SERPL-SCNC: 115 MMOL/L (ref 95–110)
CO2 SERPL-SCNC: 22 MMOL/L (ref 23–29)
CREAT SERPL-MCNC: 1.7 MG/DL (ref 0.5–1.4)
DIFFERENTIAL METHOD: ABNORMAL
EOSINOPHIL # BLD AUTO: 0.3 K/UL (ref 0–0.5)
EOSINOPHIL NFR BLD: 4.8 % (ref 0–8)
ERYTHROCYTE [DISTWIDTH] IN BLOOD BY AUTOMATED COUNT: 15.6 % (ref 11.5–14.5)
EST. GFR  (AFRICAN AMERICAN): 33.5 ML/MIN/1.73 M^2
EST. GFR  (NON AFRICAN AMERICAN): 29.1 ML/MIN/1.73 M^2
GLUCOSE SERPL-MCNC: 81 MG/DL (ref 70–110)
HCT VFR BLD AUTO: 25.6 % (ref 37–48.5)
HGB BLD-MCNC: 7.7 G/DL (ref 12–16)
IMM GRANULOCYTES # BLD AUTO: 0.04 K/UL (ref 0–0.04)
IMM GRANULOCYTES NFR BLD AUTO: 0.6 % (ref 0–0.5)
LYMPHOCYTES # BLD AUTO: 1.3 K/UL (ref 1–4.8)
LYMPHOCYTES NFR BLD: 19.5 % (ref 18–48)
MAGNESIUM SERPL-MCNC: 1.6 MG/DL (ref 1.6–2.6)
MCH RBC QN AUTO: 27.1 PG (ref 27–31)
MCHC RBC AUTO-ENTMCNC: 30.1 G/DL (ref 32–36)
MCV RBC AUTO: 90 FL (ref 82–98)
MONOCYTES # BLD AUTO: 1.2 K/UL (ref 0.3–1)
MONOCYTES NFR BLD: 17.4 % (ref 4–15)
NEUTROPHILS # BLD AUTO: 3.9 K/UL (ref 1.8–7.7)
NEUTROPHILS NFR BLD: 57.4 % (ref 38–73)
NRBC BLD-RTO: 0 /100 WBC
PHOSPHATE SERPL-MCNC: 2.7 MG/DL (ref 2.7–4.5)
PLATELET # BLD AUTO: 178 K/UL (ref 150–350)
PMV BLD AUTO: 12.1 FL (ref 9.2–12.9)
POTASSIUM SERPL-SCNC: 4 MMOL/L (ref 3.5–5.1)
PROT SERPL-MCNC: 4.4 G/DL (ref 6–8.4)
RBC # BLD AUTO: 2.84 M/UL (ref 4–5.4)
SODIUM SERPL-SCNC: 143 MMOL/L (ref 136–145)
WBC # BLD AUTO: 6.73 K/UL (ref 3.9–12.7)

## 2020-01-20 PROCEDURE — 83735 ASSAY OF MAGNESIUM: CPT | Mod: HCNC

## 2020-01-20 PROCEDURE — 80053 COMPREHEN METABOLIC PANEL: CPT | Mod: HCNC

## 2020-01-20 PROCEDURE — 97116 GAIT TRAINING THERAPY: CPT | Mod: HCNC

## 2020-01-20 PROCEDURE — 25000003 PHARM REV CODE 250: Mod: HCNC | Performed by: INTERNAL MEDICINE

## 2020-01-20 PROCEDURE — 36415 COLL VENOUS BLD VENIPUNCTURE: CPT | Mod: HCNC

## 2020-01-20 PROCEDURE — 82306 VITAMIN D 25 HYDROXY: CPT | Mod: HCNC

## 2020-01-20 PROCEDURE — 63600175 PHARM REV CODE 636 W HCPCS: Mod: HCNC | Performed by: INTERNAL MEDICINE

## 2020-01-20 PROCEDURE — 87040 BLOOD CULTURE FOR BACTERIA: CPT | Mod: HCNC

## 2020-01-20 PROCEDURE — 84100 ASSAY OF PHOSPHORUS: CPT | Mod: HCNC

## 2020-01-20 PROCEDURE — 25000003 PHARM REV CODE 250: Mod: HCNC | Performed by: STUDENT IN AN ORGANIZED HEALTH CARE EDUCATION/TRAINING PROGRAM

## 2020-01-20 PROCEDURE — 99233 SBSQ HOSP IP/OBS HIGH 50: CPT | Mod: HCNC,GC,, | Performed by: INTERNAL MEDICINE

## 2020-01-20 PROCEDURE — 63600175 PHARM REV CODE 636 W HCPCS: Mod: HCNC | Performed by: STUDENT IN AN ORGANIZED HEALTH CARE EDUCATION/TRAINING PROGRAM

## 2020-01-20 PROCEDURE — 85025 COMPLETE CBC W/AUTO DIFF WBC: CPT | Mod: HCNC

## 2020-01-20 PROCEDURE — 20600001 HC STEP DOWN PRIVATE ROOM: Mod: HCNC

## 2020-01-20 PROCEDURE — 94761 N-INVAS EAR/PLS OXIMETRY MLT: CPT | Mod: HCNC

## 2020-01-20 PROCEDURE — 99233 PR SUBSEQUENT HOSPITAL CARE,LEVL III: ICD-10-PCS | Mod: HCNC,GC,, | Performed by: INTERNAL MEDICINE

## 2020-01-20 RX ORDER — CIPROFLOXACIN 2 MG/ML
400 INJECTION, SOLUTION INTRAVENOUS
Status: DISCONTINUED | OUTPATIENT
Start: 2020-01-20 | End: 2020-01-21

## 2020-01-20 RX ORDER — CALCITRIOL 0.25 UG/1
0.5 CAPSULE ORAL DAILY
Status: DISCONTINUED | OUTPATIENT
Start: 2020-01-20 | End: 2020-01-21 | Stop reason: HOSPADM

## 2020-01-20 RX ORDER — CALCIUM CARBONATE 500(1250)
1000 TABLET ORAL DAILY
Status: DISCONTINUED | OUTPATIENT
Start: 2020-01-21 | End: 2020-01-21 | Stop reason: HOSPADM

## 2020-01-20 RX ORDER — CALCIUM GLUCONATE 94 MG/ML
1 INJECTION, SOLUTION INTRAVENOUS ONCE
Status: DISCONTINUED | OUTPATIENT
Start: 2020-01-20 | End: 2020-01-20

## 2020-01-20 RX ORDER — CALCIUM CARBONATE 500(1250)
500 TABLET ORAL DAILY
Status: DISCONTINUED | OUTPATIENT
Start: 2020-01-20 | End: 2020-01-20

## 2020-01-20 RX ADMIN — AMITRIPTYLINE HYDROCHLORIDE 50 MG: 50 TABLET, FILM COATED ORAL at 08:01

## 2020-01-20 RX ADMIN — PIPERACILLIN SODIUM,TAZOBACTAM SODIUM 4.5 G: 4; .5 INJECTION, POWDER, FOR SOLUTION INTRAVENOUS at 01:01

## 2020-01-20 RX ADMIN — PANCRELIPASE 1 CAPSULE: 60000; 12000; 38000 CAPSULE, DELAYED RELEASE PELLETS ORAL at 08:01

## 2020-01-20 RX ADMIN — APIXABAN 5 MG: 5 TABLET, FILM COATED ORAL at 09:01

## 2020-01-20 RX ADMIN — LEVETIRACETAM 500 MG: 500 TABLET ORAL at 09:01

## 2020-01-20 RX ADMIN — APIXABAN 5 MG: 5 TABLET, FILM COATED ORAL at 08:01

## 2020-01-20 RX ADMIN — CALCIUM GLUCONATE 1000 MG: 98 INJECTION, SOLUTION INTRAVENOUS at 08:01

## 2020-01-20 RX ADMIN — PANCRELIPASE 1 CAPSULE: 60000; 12000; 38000 CAPSULE, DELAYED RELEASE PELLETS ORAL at 05:01

## 2020-01-20 RX ADMIN — LEVETIRACETAM 500 MG: 500 TABLET ORAL at 08:01

## 2020-01-20 RX ADMIN — PANCRELIPASE 1 CAPSULE: 60000; 12000; 38000 CAPSULE, DELAYED RELEASE PELLETS ORAL at 01:01

## 2020-01-20 RX ADMIN — CALCITRIOL CAPSULES 0.25 MCG 0.5 MCG: 0.25 CAPSULE ORAL at 09:01

## 2020-01-20 RX ADMIN — CIPROFLOXACIN 400 MG: 2 INJECTION, SOLUTION INTRAVENOUS at 09:01

## 2020-01-20 RX ADMIN — LORAZEPAM 1 MG: 1 TABLET ORAL at 08:01

## 2020-01-20 NOTE — ASSESSMENT & PLAN NOTE
"2200: Pt refuses to wear Bipap, stated to respiratory therapist that she \"will never wear that again.\"  " It appears from past year, Cr mostly ranged from 1.7-1.9. On admission, Cr 1.5.    - Monitor daily CMP. Cr stable today  - Avoid nephrotoxic agents.

## 2020-01-20 NOTE — PT/OT/SLP PROGRESS
Physical Therapy Treatment    Patient Name:  Naty St   MRN:  [id number]    Recommendations:     Discharge Recommendations:  home health PT   Discharge Equipment Recommendations: none   Barriers to discharge: None    Assessment:     Naty St is a 74 y.o. female admitted with a medical diagnosis of Bacteremia due to Klebsiella pneumoniae.  She presents with the following impairments/functional limitations:  weakness, impaired endurance, impaired functional mobilty, gait instability, impaired balance. Pt tolerated activity with improved mobility reflected by decreased assistance required to ambulate. Pt participated in stair training, limited by length of IV line. Pt encouraged to ambulate daily with assistance/supervision from nursing/therapy or family. Pt would continue to benefit from acute skilled therapy intervention to address deficits and progress toward prior level of function.       Rehab Prognosis: Good; patient would benefit from acute skilled PT services to address these deficits and reach maximum level of function.    Recent Surgery: * No surgery found *      Plan:     During this hospitalization, patient to be seen 3 x/week to address the identified rehab impairments via gait training, therapeutic activities, therapeutic exercises, neuromuscular re-education and progress toward the following goals:    · Plan of Care Expires:  02/16/20    Subjective     Chief Complaint: pt c/o fatigue following gait training and stair training   Patient/Family Comments/goals: to get better and return home   Pain/Comfort:  · Pain Rating 1: 0/10  · Pain Rating Post-Intervention 1: 0/10      Objective:     Communicated with RN prior to session.  Patient found ambulating in room  with telemetry, peripheral IV upon PT entry to room.     General Precautions: Standard, fall   Orthopedic Precautions:N/A   Braces: N/A     Functional Mobility:  · Transfers:  Stand to sit with supervision   · Gait: pt  ambulated 120 feet with supervision, pt holding onto/managing IV pole. Pt demo'd small step size, decreased leila, narrow MERON, flexed posture. Pt with no LOB, no SOB, no dizziness.   · Stairs: Pt ascended/descended 3 stairs with stand by assistance and no AD with use of R HR. Pt with no LOB, no SOB, no dizziness. Pt declined further stair training 2/2 fatigue       AM-PAC 6 CLICK MOBILITY  Turning over in bed (including adjusting bedclothes, sheets and blankets)?: 4  Sitting down on and standing up from a chair with arms (e.g., wheelchair, bedside commode, etc.): 4  Moving from lying on back to sitting on the side of the bed?: 4  Moving to and from a bed to a chair (including a wheelchair)?: 4  Need to walk in hospital room?: 4  Climbing 3-5 steps with a railing?: 3  Basic Mobility Total Score: 23       Therapeutic Activities and Exercises:   Pt educated on role of PT/POC. Pt verbalized understanding.   Pt encouraged to only perform OOB mobility with assistance from nursing/therapy. Pt agreeable.   Pt encouraged to ambulate daily with assistance/supervision from nursing/therapy. Pt agreeable.  Pt did not have a bedside chair in room, RN notified.     Patient left sitting on sofa  with all lines intact, call button in reach, RN notified and spouse present..    GOALS:   Multidisciplinary Problems     Physical Therapy Goals        Problem: Physical Therapy Goal    Goal Priority Disciplines Outcome Goal Variances Interventions   Physical Therapy Goal     PT, PT/OT Ongoing, Progressing     Description:  Goals to be met by: 2020     Patient will increase functional independence with mobility by performin. Supine to sit with Set-up Dane  2. Sit to supine with Set-up Dane  3. Sit to stand transfer with Supervision  4. Bed to chair transfer with Supervision using LRAD  5. Gait  x 250 feet with Supervision using Rolling Walker.   6. Ascend/descend 8 stairs with Handrail and Supervision.   7.  Lower extremity exercise program x15 reps per handout, with supervision                      Time Tracking:     PT Received On: 01/20/20  PT Start Time: 1400     PT Stop Time: 1415  PT Total Time (min): 15 min     Billable Minutes: Gait Training 15 mins     Treatment Type: Treatment  PT/PTA: PT     PTA Visit Number: 0     Bia Kenzie, PT  01/20/2020

## 2020-01-20 NOTE — MEDICAL/APP STUDENT
"[facility]  Hematology/Oncology  Progress Note     Patient Name: Naty St  Admission Date: 1/16/2020  Hospital Length of Stay: 4 days     Subjective:      HPI:  Ms. St is a 74 year old woman with metastatic R lung adenocarcinoma on Xgeva and Tarceva, breast ca s/p L lumpectomy, meningioma s/p resection, pancreatic ca s/p whipple, and s/p hysterectomy w/ b/l salpingo-oophorectomy, HTN who presents with fevers. She follows with Dr. Vazquez in clinic. Patient says that this past Saturday, she felt as if the "room was spinning", and this stopped on its own. She has experienced this before. She says she has been feeling "hot" for the past week. Her family ( and son) at bedside say that she has been having chills, patient feels weak and that she has looked pale. Patient says that she has been having nausea, vomiting and dry cough. She also says she has been having a runny nose, headaches, and bilateral lower extremity swelling (that has been chronic). She denies diarrhea, dysuria, new rashes, muscle aches. She states that she had the flu shot this year. She denies changes in medications or new additions with the exception of Tarsiva being changed to the generic brand. She denies being around recent sick contacts or travel.      Patient is still receiving Xgeva (about once every 6 weeks, she believes last received around 12/12/19). She reports that Tagrisso was stopped.      ED course: Patient was febrile to 104.3F as well as tachycardic, and labs were notable for elevated LFTs. UA was not suggestive of UTI, and CXR did not show an acute process. He procal was elevated to 2.52. Flu was negative. Her initial lactic acid was WNL but the second one was elevated to 3.5. She received IVF, electrolyte replacement and a dose of zosyn. Patient states that she feels much better, and her family notes that she looks improved since initially presenting to the ED.     Oncologic History (per " "Dr. Vazquez's note 12/4/2019):  Ms. Naty St was admitted to the hospital between 01/25/2016 and 01/28/2016. She has a history of pancreatic cancer 17 years ago, breast cancer and meningioma, presented to the hospital complaining of loss of consciousness. The patient apparently was in her normal state of health and she stood up to go to the bathroom and fell to the floor. The patient's daughter helped the mother up in the bathroom and noted that her mother's upper extremities were shaking and eye rolling. No reports of bowel or bladder incontinence, tongue biting or rolling. The second episode lasted about four minutes and she was extremely lethargic following that. Apparently, the patient had a meningioma resection done in the past; however, recently, underwent neurosurgical resection with Dr. 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    Additional immunohistochemical stains were performed which revealed the tumor cells to be are positive for TTF1 and negative for ER and GCDFP. The morphology and TTF1 positivity are most consistent with lung primary.    Of note, imaging scan at the end of January 2016 revealed a mass in the lung at 2 cm in the medial aspect of the apical segment of the right upper lobe abutting the mediastinum at the level of the azygous vein and abutting and possibly encasing the segmental bronchi and vessels of the apical segment of the right upper lobe and no pleural fluid was present. Also, there is an enlarged right paratracheal lymph node. No evidence of any metastatic disease at the pancreatic site with postoperative changes post Whipple disease  Her PET Scan from 2/15/16 reveal "Hypermetabolic mass in the right lung apex consistent with a primary malignancy. Hypermetabolic mediastinal lymph nodes consistent with metastatic disease. Right sacral hypermetabolic lesion " "consistent with metastatic disease, noting additional mildly sclerotic lesions in multiple vertebral bodies which do not demonstrate abnormal hypermetabolism  She underwent IR bone biopsy which revealed metastatic adenocarcinoma of lung origin. 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  She has completed focal RT to brain lesions in March 2016.    PET scan from 6/6/16 shows "Dramatic almost complete response to therapy."  Lovenox started after US lower ext 6/7/16 shows Acute complete occlusion of one of the left posterior tibial vein.Remote partial thrombus of the proximal left superficial femoral vein.  She is on Tarceva.   12/6/16 PET scan reveals "Stable right upper lobe lesion and right hilar lymph node. No new lesions identified. Trace left pleural effusion".  1/27/17 Renal u/s "Medical renal disease. Nonobstructive right nephrolithiasis"  1/31/17 PET - "Right upper lobe nodule and right hilar lymph node similar and very low grade activity.  There is no definite evidence of recurrence."   11/9/17 PET "In this patient with history of lung cancer, there is interval increase in size and hypermetabolism of a right upper lobe lung lesion concerning for recurrent disease. Additionally, there is interval appearance of a hypermetabolic paratracheal lymph node suspicious for metastatic disease"     She progressed on Tarceva She underwent EBUS on 12/5/17. Pathology revealed adenocarcinoma but T790m could not be done as quantity was not insufficient. Her PET scan from 1/30/18 revealed The patient's previously identified abnormal lesions is slightly greater uptake of FDG on today's study compared with prior exam. These findings are concerning for progression of disease"     She was hospitalized between 4/9/18-4/16/18. Her hospital course was as follows "Patient was admitted to hemonc service on 4/9 with acute hypoxic respiratory failure. She was requiring 4 L of nc on admission. She was started on moxi for CAP. She " "received one dose of ceftriaxone in the ED. On 2nd day of admission she spiked a fever. Her Hb was ~7 g/dl so she was transfused 1 unit of PRBCs. Over night she developed worsening of her symptoms with increased O2 requirements to 15 L HFNC. Her CXR showed worsening congestion. There was a concern of TRALI vs TACO. New 2D echo with diastolic dysfunction. She was given IV lasix pushes as needed and was started on dexamethasone.  Her abx was escalated to vanc and cefepime. She improved with diuresis and steroids. Pulmonary were consulted. Her O2 requirements continued to improve. On 4/15 she was switched to Augmentin. PT recommended discharging her to SNF but the patient refused and she wanted to go home with home health. She qualified for home oxygen so she was discharged on 3 L nc while at rest and 6 while active. Augmentin and dexamethasone were discontinued on discharge"     She was readmitted from 4/27 to 5/3/18 with who presented to the ED with a complaint of fatigue and worsening DELA CRUZ. Pt diagnosed PNA 4/9 and was discharged on 4/16 on 3L oxygen. She was initially placed on cefepime for 3 days followed by an add'l 2 days of Augmentin. Pulm was consulted with assistance as her oxygen requirements were increasing. She was placed on oral steroids, then trailed on 80mg TID solumedrol for 2 days and will be discharged on a prednisone taper. She was also diuresed with 2 days of 40mg IV lasix with appropriate UOP resulting, improvement on repeat CXR. She was medically stable and appropriate for DC home with home health on 1L continuous O2. She will be seen for close f/u and may be able to come off oxygen at that time.      She discontinued Tagrisso in April 2018. Restaging scans from 6/4/18 revealed "Stable appearance of a spiculated right upper lobe pulmonary lesion.  Additional irregular focus superior to the lesion in the right lung apex is stable and may represent scar or additional lesion; although this was not " "hypermetabolic on prior PET-CT.  No new pulmonary lesions. 1.3 cm subcarinal lymph node, not hypermetabolic on prior PET-CT. Stable postsurgical changes of a Whipple procedure. Bilateral nonobstructing nephrolithiasis.  Stable sclerotic focus in the T5 vertebral body, possibly a bone island as this was not hypermetabolic on prior PET-CT. Small hiatal hernia. RECIST SUMMARY: Date of prior examination for comparison 01/30/2018 Lesion 1: Right upper lobe 1.3 cm Series 2 image 31 prior measurement 1.3 cm". Bone scan also from 6/4/18 revealed no evidence of mets.     Her PET scan from 7/11/18 revealed "Right suprahilar lesion SUV max 3.76, previously 6.86. Pretracheal lymph node SUV max 2.55, previously 3.79. There is physiologic intracranial, head, and neck activity.  There is muscle activation.  There is vocal cord activation.  There is physiologic liver, spleen, GI and  activity.  There is a hiatal hernia.  Pelvic organs show nothing unusual.  No bone lesions are seen.  There are areas of benign appearing lung scar"  She notes fatigue.  She denies any nausea, vomiting, diarrhea, constipation, abdominal pain, weight loss or loss of appetite, chest pain, shortness of breath, leg swelling, fatigue, pain, headache, dizziness, or mood changes. Her ECOG PS is 2. She is accompanied by her  and son     She has been on Tarceva since 6/5/18. She has now completed SBRT to her right lung lesions.      HPI She comes in to review her MRI brain from 11/26/19 which reveals "Stable postsurgical changes of left frontal lobe mass resection.  No evidence of residual or recurrent malignancy. Right cerebellar encephalomalacia consistent with prior infarct. Mucosal thickening and layering fluid within the paranasal sinuses which may reflect acute sinusitis"  PET scan 11/26/19 reveals "New hypermetabolic pretracheal lymph node concerning for recurrent metastatic lung cancer. Two new small ground-glass lesions in the right upper lobe " "without corresponding increased radiotracer uptake, although likely too small to characterize with PET-CT.  Recommend attention on follow-up. Focal non physiologic uptake in the right hemicolon. Findings are nonspecific but could represent underlying polyp. Further evaluation could be performed with colonoscopy if clinically warranted"  She noted cough and some bronchitis in October 2019 which has since resolved. Notes fatigue.     Interval History:  - She was seen this morning and reports that she feels much better/  - Son present at bedside, reports that she looks like her condition has improved over her time of stay in the hospital.  - One fever spike of 100.3 at 11 pm yesterday.  - She reports streaks of bright red blood with bowel movements, on wiping. She has had similar symptoms in the past, unclear as to when. No darkening of stools/melanotic stools.   - No dizziness/ light headedness, no falls since hospitalization.  - She is able to ambulate well, reports no unsteadiness. Had a session with the PT on Saturday.     Pertinent labs:  - WBC trending down since visit (now 6.73)  - Blood culture revealed Klebsiella Pnuemoniae. Vanc was discontinued, iv Ciprofloxacin started today. Repeat cultures awaited.  - Calcium is low (6.6) - receiving iv Calcium supplementation. Vitamin D - 19 - unchanged since last tested 2 years ago.   - Transaminitis is improving - AST: 70, ALT: 104, ALP: 178.    Oncology Treatment Plan:   [No treatment plan]     Medications:  Continuous Infusions:  Scheduled Meds:   amitriptyline  50 mg Oral QHS    apixaban  5 mg Oral BID    calcium carbonate  1,000 mg Oral Once    dronabinol  5 mg Oral BID AC    levETIRAcetam  500 mg Oral BID    lipase-protease-amylase 12,000-38,000-60,000 units  1 capsule Oral TID WM    LORazepam  1 mg Oral QHS    piperacillin-tazobactam (ZOSYN) IVPB  4.5 g Intravenous Q8H    polyethylene glycol  17 g Oral Daily      PRN Meds:Dextrose 10% Bolus, Dextrose 10% " Bolus, glucagon (human recombinant), glucose, glucose, ondansetron, prochlorperazine, sodium chloride 0.9%      Review of Systems   Constitutional: Positive for fever. Negative for chills and fatigue.   HENT: Negative for rhinorrhea and trouble swallowing.    Eyes: Negative for photophobia and visual disturbance.   Respiratory: Negative for cough and shortness of breath.    Cardiovascular: Negative for chest pain and leg swelling.   Gastrointestinal: Negative for abdominal distention, abdominal pain, diarrhea and nausea.   Genitourinary: Negative for difficulty urinating and dysuria.   Musculoskeletal: Negative for arthralgias and myalgias.   Allergic/Immunologic: Positive for immunocompromised state.   Neurological: Negative for syncope and headaches.   Psychiatric/Behavioral: Negative for behavioral problems and confusion.      Objective:     Vitals:    01/20/20 0326 01/20/20 0339 01/20/20 0719 01/20/20 0803   BP:  (!) 156/78  117/67   BP Location:  Right arm  Right arm   Patient Position:  Lying  Lying   Pulse: 85 85 (!) 125    Resp:  18  16   Temp:  100.1 °F (37.8 °C)  98.7 °F (37.1 °C)   TempSrc:  Oral  Oral   SpO2:  95%     Weight:       Height:         Weight: 63.5 kg (140 lb)  Body mass index is 22.6 kg/m².  Body surface area is 1.72 meters squared.    Intake/Output Summary (Last 24 hours) at 1/20/2020 1104  Last data filed at 1/20/2020 0115  Gross per 24 hour   Intake 1138 ml   Output 1625 ml   Net -487 ml        Physical Exam   Constitutional: She is oriented to person, place, and time. She appears well-developed. No distress.   HENT:   Head: Normocephalic and atraumatic. Conjunctival Pallor +  Eyes: EOM are normal.   Neck: Normal range of motion. Neck supple.   Cardiovascular: Normal rate and regular rhythm.   Pulmonary/Chest: Effort normal. No respiratory distress.   Abdominal: Soft. She exhibits no distension. There is no tenderness.   Musculoskeletal: She exhibits edema (1+ BLE up to knees). She  exhibits no tenderness.   Neurological: She is alert and oriented to person, place, and time.   Skin: Skin is warm and dry.   Psychiatric: She has a normal mood and affect. Her behavior is normal.   Nursing note and vitals reviewed.     CBC:  Lab Results   Component Value Date    WBC 6.73 01/20/2020    HGB 7.7 (L) 01/20/2020    HCT 25.6 (L) 01/20/2020    MCV 90 01/20/2020     01/20/2020      CMP:  Sodium   Date Value Ref Range Status   01/20/2020 143 136 - 145 mmol/L Final     Potassium   Date Value Ref Range Status   01/20/2020 4.0 3.5 - 5.1 mmol/L Final     Chloride   Date Value Ref Range Status   01/20/2020 115 (H) 95 - 110 mmol/L Final     CO2   Date Value Ref Range Status   01/20/2020 22 (L) 23 - 29 mmol/L Final     Glucose   Date Value Ref Range Status   01/20/2020 81 70 - 110 mg/dL Final     BUN, Bld   Date Value Ref Range Status   01/20/2020 16 8 - 23 mg/dL Final     Creatinine   Date Value Ref Range Status   01/20/2020 1.7 (H) 0.5 - 1.4 mg/dL Final     Calcium   Date Value Ref Range Status   01/20/2020 6.6 (LL) 8.7 - 10.5 mg/dL Final     Comment:     *Critical value -  Results called to and read back by: Tabatha Smith RN       Total Protein   Date Value Ref Range Status   01/20/2020 4.4 (L) 6.0 - 8.4 g/dL Final     Albumin   Date Value Ref Range Status   01/20/2020 1.9 (L) 3.5 - 5.2 g/dL Final     Total Bilirubin   Date Value Ref Range Status   01/20/2020 1.0 0.1 - 1.0 mg/dL Final     Comment:     For infants and newborns, interpretation of results should be based  on gestational age, weight and in agreement with clinical  observations.  Premature Infant recommended reference ranges:  Up to 24 hours.............<8.0 mg/dL  Up to 48 hours............<12.0 mg/dL  3-5 days..................<15.0 mg/dL  6-29 days.................<15.0 mg/dL       Alkaline Phosphatase   Date Value Ref Range Status   01/20/2020 178 (H) 55 - 135 U/L Final     AST   Date Value Ref Range Status   01/20/2020 70 (H) 10 - 40  U/L Final     ALT   Date Value Ref Range Status   01/20/2020 104 (H) 10 - 44 U/L Final     Anion Gap   Date Value Ref Range Status   01/20/2020 6 (L) 8 - 16 mmol/L Final     eGFR if    Date Value Ref Range Status   01/20/2020 33.5 (A) >60 mL/min/1.73 m^2 Final     eGFR if non    Date Value Ref Range Status   01/20/2020 29.1 (A) >60 mL/min/1.73 m^2 Final     Comment:     Calculation used to obtain the estimated glomerular filtration  rate (eGFR) is the CKD-EPI equation.        BMP:   Lab Results   Component Value Date     01/20/2020    K 4.0 01/20/2020     (H) 01/20/2020    CO2 22 (L) 01/20/2020    BUN 16 01/20/2020    CREATININE 1.7 (H) 01/20/2020    CALCIUM 6.6 (LL) 01/20/2020    ANIONGAP 6 (L) 01/20/2020    ESTGFRAFRICA 33.5 (A) 01/20/2020    EGFRNONAA 29.1 (A) 01/20/2020     Coagulation studies:   Lab Results   Component Value Date    INR 1.1 09/22/2018    APTT 43.6 (H) 09/22/2018          Diagnostic Results:  CT Chest and Abdomen on 01/17  No acute process identified in the chest, abdomen, and pelvis  Tiny foci of air scattered within the liver parenchyma, less conspicuous than prior PET-CT examination in this patient with history of Whipple procedure.  Left axillary ayad dissection in this patient with history of breast cancer.  Previously hypermetabolic right paratracheal lymph node measures 0.5 cm.  Trace right pleural effusion.  Abundant fecal material within the colon suggesting constipation.    USG Abdomen on 01/16  ild intrahepatic biliary ductal dilatation presumably secondary to patient's Whipple procedure.  Status post cholecystectomy.  Small right pleural effusion.    X-Ray Chest on 01/16  No acute findings in the chest.     Assessment/Plan:      * Klebsiella Pneumoniae bacteremia  Patient is a 74 year old woman with metastatic lung adenocarcinoma who presents with fevers. She was febrile up to 104.3F in the ED and tachycardic upon admission. Otherwise,  other vitals were stable. WBC counts are trending low over hospital course.      Blood cultures growing Klebsiella Pneumoniae resistant only to tetracycline. Unclear source as UA is negative, CXR non-revealing and CT chest-abd-pelvis is unremarkable. Possibly GI? Pt is asymptomatic other than the fever which is now subsiding.      Plan:  - Patient received 30mg/kg IVF in ED. First lactic acid WNL but second one elevated to 3.5. Repeat lactic acid is now down to normal.  - CXR showed no acute process, UA not suggestive of UTI. Flu was negative.  - Blood cultures growing Klebsiella pneumoniae- resistant only to Tetracycline.  - Ciprofloxacin iv 400 mg/day. Continue till repeat cultures negative for 2 days. Switch to oral Ciprofloxacin for 7 days post negative cultures.   - Monitor fever, if not improving will consult ID.      Hyperchloremic metabolic acidosis  Pt noted to have worsening HCO3 with hyperchloremia. AG of 10.  Likely RTA in the setting of CKD +/- medication induced     - Urine elctrolytes- Unreliable UAG in the setting of CKD   - Pt reports being on sodium bicarb tabs at home but they were discontinued last year.   - Monitor HCO3 and if still trending down will resume sodium bicarb tabs.      Elevated LFTs  Patient's LFTs on admission were elevated (AST 1300,  from prior a month ago AST 39, ). She reports no new medication additions or changes. Likely related to sepsis vs possible liver mets. Now trending low, as above.       Abdominal US: Mild intrahepatic biliary ductal dilatation         presumably secondary to patient's Whipple procedure.     Plan:  - Acute hepatitis panel is negative. Unclear etiology but possible sepsis causing elevation in LFTs.  - Hold home atorvastatin for now  - Trend daily CMP     History of deep vein thrombosis (DVT) of lower extremity  Continue home Eliquis 5mg BID     Insomnia  Continue home amitriptyline 50mg QHS     Dyslipidemia  Holding home atorvastatin in  the setting of elevated LFTs. Resume when appropriate.     Debility  PT/OT consulted, appreciate assistance     CKD (chronic kidney disease) stage 3, GFR 30-59 ml/min  It appears from past year, Cr mostly ranged from 1.7-1.9. On admission, Cr 1.5.     - Monitor daily CMP. Cr stable today at 1.7  - Avoid nephrotoxic agents.       Hypomagnesemia  Replete electrolytes PRN     Hypocalcemia  Pt reports being on calcium tabs at home, possibly secondary to CKD. Vit D levels stable and unchanged since last checked 2 years ago   Ca today 6.6  - Calcium 1000 mg PO once today.     Malignant neoplasm of lower lobe of right lung  Patient is a 74 year old woman with metastatic lung adenocarcinoma on Xgeva and Tarceva who follows with Dr. Vazquez in clinic.  - Appointment with Dr Vazquez as outpatient scheduled for 30th Jan.   - PET scan scheduled for 24th Jan.      Meningioma  Patient is s/p resection in 2016  Has been on keppra as seizure prophylaxis since - continue home keppra 500mg BID     Essential hypertension  Hold home losartan and metoprolol in the setting of sepsis. Resume when appropriate.     Deirdre Reynolds MS  Hematology/Oncology  [facility]    See today's provider note

## 2020-01-20 NOTE — PROGRESS NOTES
"[facility]  Hematology/Oncology  Progress Note    Patient Name: Naty St  Admission Date: 1/16/2020  Hospital Length of Stay: 4 days  Code Status: Prior     Subjective:     HPI:  Ms. St is a 74 year old woman with metastatic R lung adenocarcinoma on Xgeva and Tarceva, breast ca s/p L lumpectomy, meningioma s/p resection, pancreatic ca s/p whipple, and s/p hysterectomy w/ b/l salpingo-oophorectomy, HTN who presents with fevers. She follows with Dr. Vazquez in clinic. Patient says that this past Saturday, she felt as if the "room was spinning", and this stopped on its own. She has experienced this before. She says she has been feeling "hot" for the past week. Her family ( and son) at bedside say that she has been having chills, patient feels weak and that she has looked pale. Patient says that she has been having nausea, vomiting and dry cough. She also says she has been having a runny nose, headaches, and bilateral lower extremity swelling (that has been chronic). She denies diarrhea, dysuria, new rashes, muscle aches. She states that she had the flu shot this year. She denies changes in medications or new additions with the exception of Tarsiva being changed to the generic brand. She denies being around recent sick contacts or travel.     Patient is still receiving Xgeva (about once every 6 weeks, she believes last received around 12/12/19). She reports that Tagrisso was stopped.     ED course: Patient was febrile to 104.3F as well as tachycardic, and labs were notable for elevated LFTs. UA was not suggestive of UTI, and CXR did not show an acute process. He procal was elevated to 2.52. Flu was negative. Her initial lactic acid was WNL but the second one was elevated to 3.5. She received IVF, electrolyte replacement and a dose of zosyn. Patient states that she feels much better, and her family notes that she looks improved since initially presenting to the ED.    Oncologic " "History (per Dr. Vazquez's note 12/4/2019):  Ms. Naty St was admitted to the hospital between 01/25/2016 and 01/28/2016. She has a history of pancreatic cancer 17 years ago, breast cancer and meningioma, presented to the hospital complaining of loss of consciousness. The patient apparently was in her normal state of health and she stood up to go to the bathroom and fell to the floor. The patient's daughter helped the mother up in the bathroom and noted that her mother's upper extremities were shaking and eye rolling. No reports of bowel or bladder incontinence, tongue biting or rolling. The second episode lasted about four minutes and she was extremely lethargic following that. Apparently, the patient had a meningioma resection done in the past; however, recently, underwent neurosurgical resection with Dr. 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    Additional immunohistochemical stains were performed which revealed the tumor cells to be are positive for TTF1 and negative for ER and GCDFP. The morphology and TTF1 positivity are most consistent with lung primary.    Of note, imaging scan at the end of January 2016 revealed a mass in the lung at 2 cm in the medial aspect of the apical segment of the right upper lobe abutting the mediastinum at the level of the azygous vein and abutting and possibly encasing the segmental bronchi and vessels of the apical segment of the right upper lobe and no pleural fluid was present. Also, there is an enlarged right paratracheal lymph node. No evidence of any metastatic disease at the pancreatic site with postoperative changes post Whipple disease  Her PET Scan from 2/15/16 reveal "Hypermetabolic mass in the right lung apex consistent with a primary malignancy. Hypermetabolic mediastinal lymph nodes consistent with metastatic disease. Right sacral hypermetabolic " "lesion consistent with metastatic disease, noting additional mildly sclerotic lesions in multiple vertebral bodies which do not demonstrate abnormal hypermetabolism  She underwent IR bone biopsy which revealed metastatic adenocarcinoma of lung origin. 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  She has completed focal RT to brain lesions in March 2016.    PET scan from 6/6/16 shows "Dramatic almost complete response to therapy."  Lovenox started after US lower ext 6/7/16 shows Acute complete occlusion of one of the left posterior tibial vein.Remote partial thrombus of the proximal left superficial femoral vein.  She is on Tarceva.   12/6/16 PET scan reveals "Stable right upper lobe lesion and right hilar lymph node. No new lesions identified. Trace left pleural effusion".  1/27/17 Renal u/s "Medical renal disease. Nonobstructive right nephrolithiasis"  1/31/17 PET - "Right upper lobe nodule and right hilar lymph node similar and very low grade activity.  There is no definite evidence of recurrence."   11/9/17 PET "In this patient with history of lung cancer, there is interval increase in size and hypermetabolism of a right upper lobe lung lesion concerning for recurrent disease. Additionally, there is interval appearance of a hypermetabolic paratracheal lymph node suspicious for metastatic disease"     She progressed on Tarceva She underwent EBUS on 12/5/17. Pathology revealed adenocarcinoma but T790m could not be done as quantity was not insufficient. Her PET scan from 1/30/18 revealed The patient's previously identified abnormal lesions is slightly greater uptake of FDG on today's study compared with prior exam. These findings are concerning for progression of disease"     She was hospitalized between 4/9/18-4/16/18. Her hospital course was as follows "Patient was admitted to hemonc service on 4/9 with acute hypoxic respiratory failure. She was requiring 4 L of nc on admission. She was started on moxi for CAP. She " "received one dose of ceftriaxone in the ED. On 2nd day of admission she spiked a fever. Her Hb was ~7 g/dl so she was transfused 1 unit of PRBCs. Over night she developed worsening of her symptoms with increased O2 requirements to 15 L HFNC. Her CXR showed worsening congestion. There was a concern of TRALI vs TACO. New 2D echo with diastolic dysfunction. She was given IV lasix pushes as needed and was started on dexamethasone.  Her abx was escalated to vanc and cefepime. She improved with diuresis and steroids. Pulmonary were consulted. Her O2 requirements continued to improve. On 4/15 she was switched to Augmentin. PT recommended discharging her to SNF but the patient refused and she wanted to go home with home health. She qualified for home oxygen so she was discharged on 3 L nc while at rest and 6 while active. Augmentin and dexamethasone were discontinued on discharge"     She was readmitted from 4/27 to 5/3/18 with who presented to the ED with a complaint of fatigue and worsening DELA CRUZ. Pt diagnosed PNA 4/9 and was discharged on 4/16 on 3L oxygen. She was initially placed on cefepime for 3 days followed by an add'l 2 days of Augmentin. Pulm was consulted with assistance as her oxygen requirements were increasing. She was placed on oral steroids, then trailed on 80mg TID solumedrol for 2 days and will be discharged on a prednisone taper. She was also diuresed with 2 days of 40mg IV lasix with appropriate UOP resulting, improvement on repeat CXR. She was medically stable and appropriate for DC home with home health on 1L continuous O2. She will be seen for close f/u and may be able to come off oxygen at that time.      She discontinued Tagrisso in April 2018. Restaging scans from 6/4/18 revealed "Stable appearance of a spiculated right upper lobe pulmonary lesion.  Additional irregular focus superior to the lesion in the right lung apex is stable and may represent scar or additional lesion; although this was not " "hypermetabolic on prior PET-CT.  No new pulmonary lesions. 1.3 cm subcarinal lymph node, not hypermetabolic on prior PET-CT. Stable postsurgical changes of a Whipple procedure. Bilateral nonobstructing nephrolithiasis.  Stable sclerotic focus in the T5 vertebral body, possibly a bone island as this was not hypermetabolic on prior PET-CT. Small hiatal hernia. RECIST SUMMARY: Date of prior examination for comparison 01/30/2018 Lesion 1: Right upper lobe 1.3 cm Series 2 image 31 prior measurement 1.3 cm". Bone scan also from 6/4/18 revealed no evidence of mets.     Her PET scan from 7/11/18 revealed "Right suprahilar lesion SUV max 3.76, previously 6.86. Pretracheal lymph node SUV max 2.55, previously 3.79. There is physiologic intracranial, head, and neck activity.  There is muscle activation.  There is vocal cord activation.  There is physiologic liver, spleen, GI and  activity.  There is a hiatal hernia.  Pelvic organs show nothing unusual.  No bone lesions are seen.  There are areas of benign appearing lung scar"  She notes fatigue.  She denies any nausea, vomiting, diarrhea, constipation, abdominal pain, weight loss or loss of appetite, chest pain, shortness of breath, leg swelling, fatigue, pain, headache, dizziness, or mood changes. Her ECOG PS is 2. She is accompanied by her  and son     She has been on Tarceva since 6/5/18. She has now completed SBRT to her right lung lesions.      HPI She comes in to review her MRI brain from 11/26/19 which reveals "Stable postsurgical changes of left frontal lobe mass resection.  No evidence of residual or recurrent malignancy. Right cerebellar encephalomalacia consistent with prior infarct. Mucosal thickening and layering fluid within the paranasal sinuses which may reflect acute sinusitis"  PET scan 11/26/19 reveals "New hypermetabolic pretracheal lymph node concerning for recurrent metastatic lung cancer. Two new small ground-glass lesions in the right upper lobe " "without corresponding increased radiotracer uptake, although likely too small to characterize with PET-CT.  Recommend attention on follow-up. Focal non physiologic uptake in the right hemicolon. Findings are nonspecific but could represent underlying polyp. Further evaluation could be performed with colonoscopy if clinically warranted"  She noted cough and some bronchitis in October 2019 which has since resolved. Notes fatigue.    Interval History:   - Feeling better this morning. T Max 100.3 this morning in the last 24 hours.   - Blood cultures growing Klebsiella pneumonia. Zosyn switched to ciprofloxacin per sensitivities.   - Patient reports spots of blood when she went to the restroom this morning. Hb stable  - Home health upon discharge    Oncology Treatment Plan:   [No treatment plan]    Medications:  Continuous Infusions:  Scheduled Meds:   amitriptyline  50 mg Oral QHS    apixaban  5 mg Oral BID    calcitRIOL  0.5 mcg Oral Daily    [START ON 1/21/2020] calcium carbonate  1,000 mg Oral Daily    ciprofloxacin  400 mg Intravenous Q24H    dronabinol  5 mg Oral BID AC    levETIRAcetam  500 mg Oral BID    lipase-protease-amylase 12,000-38,000-60,000 units  1 capsule Oral TID WM    LORazepam  1 mg Oral QHS    polyethylene glycol  17 g Oral Daily     PRN Meds:Dextrose 10% Bolus, Dextrose 10% Bolus, glucagon (human recombinant), glucose, glucose, ondansetron, prochlorperazine, sodium chloride 0.9%     Review of Systems   Constitutional: Positive for fever. Negative for chills and fatigue.   HENT: Negative for rhinorrhea and trouble swallowing.    Eyes: Negative for photophobia and visual disturbance.   Respiratory: Negative for cough and shortness of breath.    Cardiovascular: Negative for chest pain and leg swelling.   Gastrointestinal: Positive for anal bleeding. Negative for abdominal distention, abdominal pain, diarrhea and nausea.   Genitourinary: Negative for difficulty urinating and dysuria. "   Musculoskeletal: Negative for arthralgias and myalgias.   Allergic/Immunologic: Positive for immunocompromised state.   Neurological: Negative for syncope and headaches.   Psychiatric/Behavioral: Negative for behavioral problems and confusion.     Objective:     Vital Signs (Most Recent):  Temp: 98.7 °F (37.1 °C) (01/20/20 0803)  Pulse: (!) 125 (01/20/20 0719)  Resp: 16 (01/20/20 0803)  BP: 117/67 (01/20/20 0803)  SpO2: 95 % (01/20/20 0339) Vital Signs (24h Range):  Temp:  [98.2 °F (36.8 °C)-100.3 °F (37.9 °C)] 98.7 °F (37.1 °C)  Pulse:  [] 125  Resp:  [16-20] 16  SpO2:  [94 %-99 %] 95 %  BP: (117-156)/(64-78) 117/67     Weight: 63.5 kg (140 lb)  Body mass index is 22.6 kg/m².  Body surface area is 1.72 meters squared.      Intake/Output Summary (Last 24 hours) at 1/20/2020 1111  Last data filed at 1/20/2020 0115  Gross per 24 hour   Intake 1138 ml   Output 1625 ml   Net -487 ml       Physical Exam   Constitutional: She is oriented to person, place, and time. She appears well-developed. No distress.   HENT:   Head: Normocephalic and atraumatic.   Eyes: EOM are normal.   Neck: Normal range of motion. Neck supple.   Cardiovascular: Normal rate and regular rhythm.   Pulmonary/Chest: Effort normal. No respiratory distress.   Abdominal: Soft. She exhibits no distension. There is no tenderness.   Pt refused rectal exam.    Musculoskeletal: She exhibits edema (1+ BLE up to knees). She exhibits no tenderness.   Neurological: She is alert and oriented to person, place, and time.   Skin: Skin is warm and dry.   Psychiatric: She has a normal mood and affect. Her behavior is normal.   Nursing note and vitals reviewed.      Significant Labs:   CBC:   Recent Labs   Lab 01/19/20  0526 01/20/20  0322   WBC 10.58 6.73   HGB 7.8* 7.7*   HCT 26.6* 25.6*    178    and CMP:   Recent Labs   Lab 01/19/20  0526 01/20/20  0322    143   K 3.5 4.0   * 115*   CO2 22* 22*   GLU 86 81   BUN 17 16   CREATININE 1.6* 1.7*    CALCIUM 6.7* 6.6*   PROT 4.6* 4.4*   ALBUMIN 2.0* 1.9*   BILITOT 1.2* 1.0   ALKPHOS 186* 178*   * 70*   * 104*   ANIONGAP 6* 6*   EGFRNONAA 31.3* 29.1*       Diagnostic Results:  I have reviewed and interpreted all pertinent imaging results/findings within the past 24 hours.    Assessment/Plan:     * Bacteremia due to Klebsiella pneumoniae  Patient is a 74 year old woman with metastatic lung adenocarcinoma who presents with fevers. She was febrile up to 104.3F in the ED and tachycardic upon admission. Otherwise, other vitals were stable. She had a normal WBC.     Blood cultures growing gram negative rods. Unclear source as UA is negative, CXR non-revealing and CT chest-abd-pelvis is unremarkable. Possibly biliary? Pt does have a history of whipple's procedure. LFTs and T bilis elevated on admission. Pt is asymptomatic other than the fever which is now subsiding. WBC trending down.     Plan:  - Patient received 30mg/kg IVF in ED. First lactic acid WNL but second one elevated to 3.5. Repeat lactic acid is now down to normal.  - CXR showed no acute process, UA not suggestive of UTI. Flu was negative.  - Blood cultures growing Klebsiella pneumonia. Pansensitive.  - Switch zosyn to Ciprofloxacin 400 mg IV BID. Cultures from 1/19 negative thus far. Waiting for >48 hours of negative cultures to switch to PO and hopefully discharge.      Hyperchloremic metabolic acidosis  Pt noted to have worsening HCO3 with hyperchloremia. AG of 10.  Likely RTA in the setting of CKD +/- medication induced    - Urine elctrolytes- Unreliable UAG in the setting of CKD  - received 1 L of Bicarb on 1/18. HCO3 improved to 22    - Pt reports being on sodium bicarb tabs at home but they were discontinued last year.   - Monitor HCO3 and if still trending down will resume sodium bicarb tabs.     Elevated LFTs  Patient's LFTs on admission were elevated (AST 1300,  from prior a month ago AST 39, ). She reports no new  medication additions or changes. Likely related to sepsis vs possible liver mets    Plan:  - Abdominal US: Mild intrahepatic biliary ductal dilatation presumably secondary to patient's Whipple procedure.  - Acute hepatitis panel is negative. Unclear etiology but possible sepsis causing elevation in LFTs.  - Hold home atorvastatin for now  - Trend daily CMP, improving now.     History of deep vein thrombosis (DVT) of lower extremity  Continue home Eliquis 5mg BID    Insomnia  Continue home amitriptyline 50mg QHS    Dyslipidemia  Holding home atorvastatin in the setting of elevated LFTs. Resume when appropriate.    Debility  PT/OT consulted, appreciate assistance    CKD (chronic kidney disease) stage 3, GFR 30-59 ml/min  It appears from past year, Cr mostly ranged from 1.7-1.9. On admission, Cr 1.5.    - Monitor daily CMP. Cr stable today  - Avoid nephrotoxic agents.         Hypomagnesemia      Replete electrolytes PRN    Hypocalcemia  Pt reports being on calcium tabs at home, possibly secondary to CKD.   Ca today 6.7--> Corrected 8.3  - Calcium gluconate 1 gr IV today..  - Low vit D levels.  - Start Calcitriol and TUMS 1000 mg daily.    Malignant neoplasm of lower lobe of right lung  Patient is a 74 year old woman with metastatic lung adenocarcinoma on Xgeva and Tarceva who follows with Dr. Vazquez in clinic.  - Per pharmacy, only staff can order Tarceva  - Will need outpatient follow up with Dr. Vazquez upon discharge    Meningioma  Patient is s/p resection in 2016  Has been on keppra as seizure prophylaxis since - continue home keppra 500mg BID    Essential hypertension  Hold home losartan and metoprolol in the setting of sepsis. Resume when appropriate.      Daryl Lara MD  Hematology/Oncology  [facility]    Attending Note  I have personally taken the history and examined this patient and agree with the resident's note as stated above.  Klebsiella relatively pan sensitive  IV Cipro while  awaiting final culture then switch to PO Cipro

## 2020-01-20 NOTE — SUBJECTIVE & OBJECTIVE
Interval History:   - Feeling better this morning. T Max 100.3 this morning in the last 24 hours.   - Blood cultures growing Klebsiella pneumonia. Zosyn switched to ciprofloxacin per sensitivities.   - Patient reports spots of blood when she went to the restroom this morning. Hb stable    Oncology Treatment Plan:   [No treatment plan]    Medications:  Continuous Infusions:  Scheduled Meds:   amitriptyline  50 mg Oral QHS    apixaban  5 mg Oral BID    calcitRIOL  0.5 mcg Oral Daily    [START ON 1/21/2020] calcium carbonate  1,000 mg Oral Daily    ciprofloxacin  400 mg Intravenous Q24H    dronabinol  5 mg Oral BID AC    levETIRAcetam  500 mg Oral BID    lipase-protease-amylase 12,000-38,000-60,000 units  1 capsule Oral TID WM    LORazepam  1 mg Oral QHS    polyethylene glycol  17 g Oral Daily     PRN Meds:Dextrose 10% Bolus, Dextrose 10% Bolus, glucagon (human recombinant), glucose, glucose, ondansetron, prochlorperazine, sodium chloride 0.9%     Review of Systems   Constitutional: Positive for fever. Negative for chills and fatigue.   HENT: Negative for rhinorrhea and trouble swallowing.    Eyes: Negative for photophobia and visual disturbance.   Respiratory: Negative for cough and shortness of breath.    Cardiovascular: Negative for chest pain and leg swelling.   Gastrointestinal: Positive for anal bleeding. Negative for abdominal distention, abdominal pain, diarrhea and nausea.   Genitourinary: Negative for difficulty urinating and dysuria.   Musculoskeletal: Negative for arthralgias and myalgias.   Allergic/Immunologic: Positive for immunocompromised state.   Neurological: Negative for syncope and headaches.   Psychiatric/Behavioral: Negative for behavioral problems and confusion.     Objective:     Vital Signs (Most Recent):  Temp: 98.7 °F (37.1 °C) (01/20/20 0803)  Pulse: (!) 125 (01/20/20 0719)  Resp: 16 (01/20/20 0803)  BP: 117/67 (01/20/20 0803)  SpO2: 95 % (01/20/20 0339) Vital Signs (24h  Range):  Temp:  [98.2 °F (36.8 °C)-100.3 °F (37.9 °C)] 98.7 °F (37.1 °C)  Pulse:  [] 125  Resp:  [16-20] 16  SpO2:  [94 %-99 %] 95 %  BP: (117-156)/(64-78) 117/67     Weight: 63.5 kg (140 lb)  Body mass index is 22.6 kg/m².  Body surface area is 1.72 meters squared.      Intake/Output Summary (Last 24 hours) at 1/20/2020 1111  Last data filed at 1/20/2020 0115  Gross per 24 hour   Intake 1138 ml   Output 1625 ml   Net -487 ml       Physical Exam   Constitutional: She is oriented to person, place, and time. She appears well-developed. No distress.   HENT:   Head: Normocephalic and atraumatic.   Eyes: EOM are normal.   Neck: Normal range of motion. Neck supple.   Cardiovascular: Normal rate and regular rhythm.   Pulmonary/Chest: Effort normal. No respiratory distress.   Abdominal: Soft. She exhibits no distension. There is no tenderness.   Pt refused rectal exam.    Musculoskeletal: She exhibits edema (1+ BLE up to knees). She exhibits no tenderness.   Neurological: She is alert and oriented to person, place, and time.   Skin: Skin is warm and dry.   Psychiatric: She has a normal mood and affect. Her behavior is normal.   Nursing note and vitals reviewed.      Significant Labs:   CBC:   Recent Labs   Lab 01/19/20  0526 01/20/20  0322   WBC 10.58 6.73   HGB 7.8* 7.7*   HCT 26.6* 25.6*    178    and CMP:   Recent Labs   Lab 01/19/20  0526 01/20/20  0322    143   K 3.5 4.0   * 115*   CO2 22* 22*   GLU 86 81   BUN 17 16   CREATININE 1.6* 1.7*   CALCIUM 6.7* 6.6*   PROT 4.6* 4.4*   ALBUMIN 2.0* 1.9*   BILITOT 1.2* 1.0   ALKPHOS 186* 178*   * 70*   * 104*   ANIONGAP 6* 6*   EGFRNONAA 31.3* 29.1*       Diagnostic Results:  I have reviewed and interpreted all pertinent imaging results/findings within the past 24 hours.

## 2020-01-20 NOTE — ASSESSMENT & PLAN NOTE
Pt noted to have worsening HCO3 with hyperchloremia. AG of 10.  Likely RTA in the setting of CKD +/- medication induced    - Urine elctrolytes- Unreliable UAG in the setting of CKD  - received 1 L of Bicarb on 1/18. HCO3 improved to 22    - Pt reports being on sodium bicarb tabs at home but they were discontinued last year.   - Monitor HCO3 and if still trending down will resume sodium bicarb tabs.

## 2020-01-20 NOTE — ASSESSMENT & PLAN NOTE
Pt reports being on calcium tabs at home, possibly secondary to CKD.   Ca today 6.7--> Corrected 8.3  - Calcium gluconate 1 gr IV today..  - Low vit D levels.  - Start Calcitriol and TUMS 1000 mg daily.

## 2020-01-20 NOTE — ASSESSMENT & PLAN NOTE
Patient's LFTs on admission were elevated (AST 1300,  from prior a month ago AST 39, ). She reports no new medication additions or changes. Likely related to sepsis vs possible liver mets    Plan:  - Abdominal US: Mild intrahepatic biliary ductal dilatation presumably secondary to patient's Whipple procedure.  - Acute hepatitis panel is negative. Unclear etiology but possible sepsis causing elevation in LFTs.  - Hold home atorvastatin for now  - Trend daily CMP, improving now.

## 2020-01-20 NOTE — PLAN OF CARE
Patient is oriented X4. Ambulates to bathroom with minimal assist. PIV to right FA flushed and SL. No complaints of pain or discomfort at this time. ABT continued. Calcium of 6.6 reported to MD. Plan of care reviewed with patient and family. Son remains at bedside. All safety precautions in place. Remains free of injury. Patient is stable. Will continue to monitor.

## 2020-01-20 NOTE — PLAN OF CARE
Problem: Physical Therapy Goal  Goal: Physical Therapy Goal  Description  Goals to be met by: 2020     Patient will increase functional independence with mobility by performin. Supine to sit with Set-up [place]  2. Sit to supine with Set-up [place]  3. Sit to stand transfer with Supervision  4. Bed to chair transfer with Supervision using LRAD  5. Gait  x 250 feet with Supervision using Rolling Walker.   6. Ascend/descend 8 stairs with Handrail and Supervision.   7. Lower extremity exercise program x15 reps per handout, with supervision     Outcome: Ongoing, Progressing     Pt is progressing toward goals. All goals remain appropriate.    Bia Espino, PT, DPT  2020  [phone]

## 2020-01-20 NOTE — PROGRESS NOTES
Pharmacist Renal Dose Adjustment Note    Naty St is a 74 y.o. female being treated with the medication ciprofloxacin    Patient Data:    Vital Signs (Most Recent):  Temp: 100.1 °F (37.8 °C) (01/20/20 0339)  Pulse: 85 (01/20/20 0339)  Resp: 18 (01/20/20 0339)  BP: (!) 156/78 (01/20/20 0339)  SpO2: 95 % (01/20/20 0339)   Vital Signs (72h Range):  Temp:  [98.1 °F (36.7 °C)-101 °F (38.3 °C)]   Pulse:  []   Resp:  [15-20]   BP: (105-162)/(51-90)   SpO2:  [93 %-100 %]      Recent Labs   Lab 01/18/20  0437 01/19/20  0526 01/20/20  0322   CREATININE 1.8* 1.6* 1.7*     Serum creatinine: 1.7 mg/dL (H) 01/20/20 0322  Estimated creatinine clearance: 26.8 mL/min (A)    Ciprofloxacin 400mg IV Q12H will be changed to Ciprofloxacin 400mg IV Q24H    Redd Fernandez PharmD, BCPS  Clinical Pharmacist  Spectra Link: [phone]

## 2020-01-20 NOTE — PROGRESS NOTES
Attended morning rounds on patient with Dr. Najera's Medical Oncology team. Patient will possibly be discharged tomorrow, and home health services will be ordered for her. Called [facility] liason nurse at ext. [phone]; spoke with [place] to initiate the referral. Will continue to follow.

## 2020-01-20 NOTE — ASSESSMENT & PLAN NOTE
Patient is a 74 year old woman with metastatic lung adenocarcinoma who presents with fevers. She was febrile up to 104.3F in the ED and tachycardic upon admission. Otherwise, other vitals were stable. She had a normal WBC.     Blood cultures growing gram negative rods. Unclear source as UA is negative, CXR non-revealing and CT chest-abd-pelvis is unremarkable. Possibly biliary? Pt does have a history of whipple's procedure. LFTs and T bilis elevated on admission. Pt is asymptomatic other than the fever which is now subsiding. WBC trending down.     Plan:  - Patient received 30mg/kg IVF in ED. First lactic acid WNL but second one elevated to 3.5. Repeat lactic acid is now down to normal.  - CXR showed no acute process, UA not suggestive of UTI. Flu was negative.  - Blood cultures growing Klebsiella pneumonia. Pansensitive.  - Switch zosyn to Ciprofloxacin 400 mg IV BID. Cultures from 1/19 negative thus far. Waiting for >48 hours of negative cultures to switch to PO and hopefully discharge.

## 2020-01-21 VITALS
RESPIRATION RATE: 18 BRPM | OXYGEN SATURATION: 96 % | HEIGHT: 66 IN | DIASTOLIC BLOOD PRESSURE: 76 MMHG | SYSTOLIC BLOOD PRESSURE: 129 MMHG | BODY MASS INDEX: 22.5 KG/M2 | HEART RATE: 103 BPM | WEIGHT: 140 LBS | TEMPERATURE: 98 F

## 2020-01-21 LAB
ALBUMIN SERPL BCP-MCNC: 2.1 G/DL (ref 3.5–5.2)
ALP SERPL-CCNC: 226 U/L (ref 55–135)
ALT SERPL W/O P-5'-P-CCNC: 92 U/L (ref 10–44)
ANION GAP SERPL CALC-SCNC: 7 MMOL/L (ref 8–16)
ANISOCYTOSIS BLD QL SMEAR: SLIGHT
AST SERPL-CCNC: 62 U/L (ref 10–40)
BACTERIA BLD CULT: ABNORMAL
BASOPHILS # BLD AUTO: 0.03 K/UL (ref 0–0.2)
BASOPHILS NFR BLD: 0.6 % (ref 0–1.9)
BILIRUB SERPL-MCNC: 0.8 MG/DL (ref 0.1–1)
BUN SERPL-MCNC: 15 MG/DL (ref 8–23)
BURR CELLS BLD QL SMEAR: ABNORMAL
CALCIUM SERPL-MCNC: 7.1 MG/DL (ref 8.7–10.5)
CHLORIDE SERPL-SCNC: 115 MMOL/L (ref 95–110)
CO2 SERPL-SCNC: 18 MMOL/L (ref 23–29)
CREAT SERPL-MCNC: 1.6 MG/DL (ref 0.5–1.4)
DIFFERENTIAL METHOD: ABNORMAL
EOSINOPHIL # BLD AUTO: 0.3 K/UL (ref 0–0.5)
EOSINOPHIL NFR BLD: 4.9 % (ref 0–8)
ERYTHROCYTE [DISTWIDTH] IN BLOOD BY AUTOMATED COUNT: 16.1 % (ref 11.5–14.5)
EST. GFR  (AFRICAN AMERICAN): 36.1 ML/MIN/1.73 M^2
EST. GFR  (NON AFRICAN AMERICAN): 31.3 ML/MIN/1.73 M^2
GLUCOSE SERPL-MCNC: 84 MG/DL (ref 70–110)
HCT VFR BLD AUTO: 28.1 % (ref 37–48.5)
HGB BLD-MCNC: 8.4 G/DL (ref 12–16)
HYPOCHROMIA BLD QL SMEAR: ABNORMAL
IMM GRANULOCYTES # BLD AUTO: 0.05 K/UL (ref 0–0.04)
IMM GRANULOCYTES NFR BLD AUTO: 1 % (ref 0–0.5)
LYMPHOCYTES # BLD AUTO: 1.4 K/UL (ref 1–4.8)
LYMPHOCYTES NFR BLD: 27.3 % (ref 18–48)
MAGNESIUM SERPL-MCNC: 1.7 MG/DL (ref 1.6–2.6)
MCH RBC QN AUTO: 27.5 PG (ref 27–31)
MCHC RBC AUTO-ENTMCNC: 29.9 G/DL (ref 32–36)
MCV RBC AUTO: 92 FL (ref 82–98)
MONOCYTES # BLD AUTO: 0.8 K/UL (ref 0.3–1)
MONOCYTES NFR BLD: 15.3 % (ref 4–15)
NEUTROPHILS # BLD AUTO: 2.6 K/UL (ref 1.8–7.7)
NEUTROPHILS NFR BLD: 50.9 % (ref 38–73)
NRBC BLD-RTO: 1 /100 WBC
OVALOCYTES BLD QL SMEAR: ABNORMAL
PHOSPHATE SERPL-MCNC: 2.6 MG/DL (ref 2.7–4.5)
PLATELET # BLD AUTO: 168 K/UL (ref 150–350)
PMV BLD AUTO: ABNORMAL FL (ref 9.2–12.9)
POIKILOCYTOSIS BLD QL SMEAR: ABNORMAL
POLYCHROMASIA BLD QL SMEAR: ABNORMAL
POTASSIUM SERPL-SCNC: 4.7 MMOL/L (ref 3.5–5.1)
PROT SERPL-MCNC: 5.2 G/DL (ref 6–8.4)
RBC # BLD AUTO: 3.05 M/UL (ref 4–5.4)
SCHISTOCYTES BLD QL SMEAR: ABNORMAL
SODIUM SERPL-SCNC: 140 MMOL/L (ref 136–145)
WBC # BLD AUTO: 5.09 K/UL (ref 3.9–12.7)

## 2020-01-21 PROCEDURE — 36415 COLL VENOUS BLD VENIPUNCTURE: CPT | Mod: HCNC

## 2020-01-21 PROCEDURE — 25000003 PHARM REV CODE 250: Mod: HCNC | Performed by: STUDENT IN AN ORGANIZED HEALTH CARE EDUCATION/TRAINING PROGRAM

## 2020-01-21 PROCEDURE — 84100 ASSAY OF PHOSPHORUS: CPT | Mod: HCNC

## 2020-01-21 PROCEDURE — 99239 HOSP IP/OBS DSCHRG MGMT >30: CPT | Mod: HCNC,GC,, | Performed by: INTERNAL MEDICINE

## 2020-01-21 PROCEDURE — 87040 BLOOD CULTURE FOR BACTERIA: CPT | Mod: HCNC

## 2020-01-21 PROCEDURE — 25000003 PHARM REV CODE 250: Mod: HCNC | Performed by: INTERNAL MEDICINE

## 2020-01-21 PROCEDURE — 80053 COMPREHEN METABOLIC PANEL: CPT | Mod: HCNC

## 2020-01-21 PROCEDURE — 85025 COMPLETE CBC W/AUTO DIFF WBC: CPT | Mod: HCNC

## 2020-01-21 PROCEDURE — 83735 ASSAY OF MAGNESIUM: CPT | Mod: HCNC

## 2020-01-21 PROCEDURE — 99239 PR HOSPITAL DISCHARGE DAY,>30 MIN: ICD-10-PCS | Mod: HCNC,GC,, | Performed by: INTERNAL MEDICINE

## 2020-01-21 PROCEDURE — 63600175 PHARM REV CODE 636 W HCPCS: Mod: HCNC | Performed by: INTERNAL MEDICINE

## 2020-01-21 RX ORDER — CALCIUM CARBONATE 500(1250)
1000 TABLET ORAL DAILY
Refills: 0 | Status: ON HOLD
Start: 2020-01-22 | End: 2021-01-01 | Stop reason: HOSPADM

## 2020-01-21 RX ORDER — CIPROFLOXACIN 750 MG/1
750 TABLET, FILM COATED ORAL EVERY 12 HOURS
Qty: 24 TABLET | Refills: 0 | Status: ON HOLD | OUTPATIENT
Start: 2020-01-22 | End: 2020-02-19 | Stop reason: HOSPADM

## 2020-01-21 RX ORDER — ERLOTINIB HYDROCHLORIDE 150 MG/1
150 TABLET, FILM COATED ORAL DAILY
Qty: 30 TABLET | Refills: 11
Start: 2020-01-21 | End: 2020-05-25

## 2020-01-21 RX ADMIN — CALCIUM 1000 MG: 500 TABLET ORAL at 08:01

## 2020-01-21 RX ADMIN — PANCRELIPASE 1 CAPSULE: 60000; 12000; 38000 CAPSULE, DELAYED RELEASE PELLETS ORAL at 01:01

## 2020-01-21 RX ADMIN — LEVETIRACETAM 500 MG: 500 TABLET ORAL at 08:01

## 2020-01-21 RX ADMIN — APIXABAN 5 MG: 5 TABLET, FILM COATED ORAL at 08:01

## 2020-01-21 RX ADMIN — CALCITRIOL CAPSULES 0.25 MCG 0.5 MCG: 0.25 CAPSULE ORAL at 08:01

## 2020-01-21 RX ADMIN — CIPROFLOXACIN 400 MG: 2 INJECTION, SOLUTION INTRAVENOUS at 08:01

## 2020-01-21 RX ADMIN — PANCRELIPASE 1 CAPSULE: 60000; 12000; 38000 CAPSULE, DELAYED RELEASE PELLETS ORAL at 08:01

## 2020-01-21 NOTE — PLAN OF CARE
Side rails up x2; call bell in place; bed in lowest, locked position; skid proof socks on; no evidence of skin breakdown; care plan explained to patient; pt remains free of injury.  Pt tolerated po, voids, ambulates in room, no BM today. Pt with d/c home orders. Reviewed instructions, appts, medications and new prescriptions. Iv d/cd dressing applied. VSS and afebrile. Pt stated she was contacting family for a ride home.

## 2020-01-21 NOTE — PHYSICIAN QUERY
PT Name: Naty St  MR #: [id number]     Physician Query Form - Documentation Clarification      CDS: Zehra Barroso RN    Contact information:[email or web address]    This form is a permanent document in the medical record.     Query Date: January 21, 2020    By submitting this query, we are merely seeking further clarification of documentation. Please utilize your independent clinical judgment when addressing the question(s) below.    The Medical record reflects the following:    Supporting Clinical Findings Location in Medical Record   Bacteremia due to Klebsiella pneumoniae  Patient is a 74 year old woman with metastatic lung adenocarcinoma who presents with fevers. She was febrile up to 104.3F in the ED and tachycardic upon admission. Otherwise, other vitals were stable. She had a normal WBC.   Blood cultures growing gram negative rods. Unclear source as UA is negative, CXR non-revealing and CT chest-abd-pelvis is unremarkable. Possibly biliary? Pt does have a history of whipple's procedure. LFTs and T bilis elevated on admission. Pt is asymptomatic other than the fever which is now subsiding. WBC trending down.   Plan:  - Patient received 30mg/kg IVF in ED. First lactic acid WNL but second one elevated to 3.5. Repeat lactic acid is now down to normal.  - CXR showed no acute process, UA not suggestive of UTI. Flu was negative.  - Blood cultures growing Klebsiella pneumonia. Pansensitive.  - Switch zosyn to Ciprofloxacin 400 mg IV BID. Cultures from 1/19 negative thus far. Waiting for >48 hours of negative cultures to switch to PO and hopefully discharge.   Hem/Onc PN 1/20   Sepsis  Patient is a 74 year old woman with metastatic lung adenocarcinoma who presents with fevers. She was febrile up to 104.3F in the ED and tachycardic upon admission. Otherwise, other vitals were stable. She had a normal WBC.   DDx broadly at this time include bacteremia, cholecystitis, influenza, UTI, PNA, meningitis.    Plan:  - Continue broad spectrum antibiotics vancomycin and zosyn  - Patient received 30mg/kg IVF in ED. First lactic acid WNL but second one elevated to 3.5. Will give additional bolus and repeat lactic acid   - CXR showed no acute process, UA not suggestive of UTI. Flu was negative. Follow up blood cultures  - Abdominal US, hepatitis panel ordered as mentioned in elevated LFTs   H&P 1/17                                                                            Doctor, clarify the conflicting documentation.     Provider Use Only      [ x ] Bacteremia due to Klebsiella pneumoniae      [  ] Sepsis due to Klebsiella pneumoniae      [  ] Other:________________________                                                                                                             [  ] Clinically Undetermined

## 2020-01-21 NOTE — PLAN OF CARE
Side rails up x2; call bell in place; bed in lowest, locked position; skid proof socks on; no evidence of skin breakdown; care plan explained to patient; pt remains free of injury.  Tolerated po, voids, ambulates, BM x 1, denies pain, n/v or diarrhia. Zosyn and cipro given as scheduled. Calcium gluconate given for ca 6.6. VSS and afebrile.

## 2020-01-21 NOTE — MEDICAL/APP STUDENT
"[facility]  Hematology/Oncology  Progress Note     Patient Name: Naty St  Admission Date: 1/16/2020  Hospital Length of Stay: 5 days  Code Status: Prior      Subjective:      HPI:  Ms. St is a 74 year old woman with metastatic R lung adenocarcinoma on Xgeva and Tarceva, breast ca s/p L lumpectomy, meningioma s/p resection, pancreatic ca s/p whipple, and s/p hysterectomy w/ b/l salpingo-oophorectomy, HTN who presents with fevers. She follows with Dr. Vazquez in clinic. Patient says that this past Saturday, she felt as if the "room was spinning", and this stopped on its own. She has experienced this before. She says she has been feeling "hot" for the past week. Her family ( and son) at bedside say that she has been having chills, patient feels weak and that she has looked pale. Patient says that she has been having nausea, vomiting and dry cough. She also says she has been having a runny nose, headaches, and bilateral lower extremity swelling (that has been chronic). She denies diarrhea, dysuria, new rashes, muscle aches. She states that she had the flu shot this year. She denies changes in medications or new additions with the exception of Tarsiva being changed to the generic brand. She denies being around recent sick contacts or travel.      Patient is still receiving Xgeva (about once every 6 weeks, she believes last received around 12/12/19). She reports that Tagrisso was stopped.      ED course: Patient was febrile to 104.3F as well as tachycardic, and labs were notable for elevated LFTs. UA was not suggestive of UTI, and CXR did not show an acute process. He procal was elevated to 2.52. Flu was negative. Her initial lactic acid was WNL but the second one was elevated to 3.5. She received IVF, electrolyte replacement and a dose of zosyn. Patient states that she feels much better, and her family notes that she looks improved since initially presenting to the " "ED.     Oncologic History (per Dr. Vazquez's note 12/4/2019):  Ms. Naty St was admitted to the hospital between 01/25/2016 and 01/28/2016. She has a history of pancreatic cancer 17 years ago, breast cancer and meningioma, presented to the hospital complaining of loss of consciousness. The patient apparently was in her normal state of health and she stood up to go to the bathroom and fell to the floor. The patient's daughter helped the mother up in the bathroom and noted that her mother's upper extremities were shaking and eye rolling. No reports of bowel or bladder incontinence, tongue biting or rolling. The second episode lasted about four minutes and she was extremely lethargic following that. Apparently, the patient had a meningioma resection done in the past; however, recently, underwent neurosurgical resection with Dr. 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    Additional immunohistochemical stains were performed which revealed the tumor cells to be are positive for TTF1 and negative for ER and GCDFP. The morphology and TTF1 positivity are most consistent with lung primary.    Of note, imaging scan at the end of January 2016 revealed a mass in the lung at 2 cm in the medial aspect of the apical segment of the right upper lobe abutting the mediastinum at the level of the azygous vein and abutting and possibly encasing the segmental bronchi and vessels of the apical segment of the right upper lobe and no pleural fluid was present. Also, there is an enlarged right paratracheal lymph node. No evidence of any metastatic disease at the pancreatic site with postoperative changes post Whipple disease  Her PET Scan from 2/15/16 reveal "Hypermetabolic mass in the right lung apex consistent with a primary malignancy. Hypermetabolic mediastinal lymph nodes consistent with metastatic disease. Right sacral " "hypermetabolic lesion consistent with metastatic disease, noting additional mildly sclerotic lesions in multiple vertebral bodies which do not demonstrate abnormal hypermetabolism  She underwent IR bone biopsy which revealed metastatic adenocarcinoma of lung origin. 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  She has completed focal RT to brain lesions in March 2016.    PET scan from 6/6/16 shows "Dramatic almost complete response to therapy."  Lovenox started after US lower ext 6/7/16 shows Acute complete occlusion of one of the left posterior tibial vein.Remote partial thrombus of the proximal left superficial femoral vein.  She is on Tarceva.   12/6/16 PET scan reveals "Stable right upper lobe lesion and right hilar lymph node. No new lesions identified. Trace left pleural effusion".  1/27/17 Renal u/s "Medical renal disease. Nonobstructive right nephrolithiasis"  1/31/17 PET - "Right upper lobe nodule and right hilar lymph node similar and very low grade activity.  There is no definite evidence of recurrence."   11/9/17 PET "In this patient with history of lung cancer, there is interval increase in size and hypermetabolism of a right upper lobe lung lesion concerning for recurrent disease. Additionally, there is interval appearance of a hypermetabolic paratracheal lymph node suspicious for metastatic disease"     She progressed on Tarceva She underwent EBUS on 12/5/17. Pathology revealed adenocarcinoma but T790m could not be done as quantity was not insufficient. Her PET scan from 1/30/18 revealed The patient's previously identified abnormal lesions is slightly greater uptake of FDG on today's study compared with prior exam. These findings are concerning for progression of disease"     She was hospitalized between 4/9/18-4/16/18. Her hospital course was as follows "[facility] acute hypoxic respiratory failure. She was requiring 4 L of nc on admission. She was started on " "moxi for CAP. She received one dose of ceftriaxone in the ED. On 2nd day of admission she spiked a fever. Her Hb was ~7 g/dl so she was transfused 1 unit of PRBCs. Over night she developed worsening of her symptoms with increased O2 requirements to 15 L HFNC. Her CXR showed worsening congestion. There was a concern of TRALI vs TACO. New 2D echo with diastolic dysfunction. She was given IV lasix pushes as needed and was started on dexamethasone.  Her abx was escalated to vanc and cefepime. She improved with diuresis and steroids. Pulmonary were consulted. Her O2 requirements continued to improve. On 4/15 she was switched to Augmentin. PT recommended discharging her to SNF but the patient refused and she wanted to go home with home health. She qualified for home oxygen so she was discharged on 3 L nc while at rest and 6 while active. Augmentin and dexamethasone were discontinued on discharge"     She was readmitted from 4/27 to 5/3/18 with who presented to the ED with a complaint of fatigue and worsening DELA CRUZ. Pt diagnosed PNA 4/9 and was discharged on 4/16 on 3L oxygen. She was initially placed on cefepime for 3 days followed by an add'l 2 days of Augmentin. Pulm was consulted with assistance as her oxygen requirements were increasing. She was placed on oral steroids, then trailed on 80mg TID solumedrol for 2 days and will be discharged on a prednisone taper. She was also diuresed with 2 days of 40mg IV lasix with appropriate UOP resulting, improvement on repeat CXR. She was medically stable and appropriate for DC home with home health on 1L continuous O2. She will be seen for close f/u and may be able to come off oxygen at that time.      She discontinued Tagrisso in April 2018. Restaging scans from 6/4/18 revealed "Stable appearance of a spiculated right upper lobe pulmonary lesion.  Additional irregular focus superior to the lesion in the right lung apex is stable and may represent scar or additional lesion; " "although this was not hypermetabolic on prior PET-CT.  No new pulmonary lesions. 1.3 cm subcarinal lymph node, not hypermetabolic on prior PET-CT. Stable postsurgical changes of a Whipple procedure. Bilateral nonobstructing nephrolithiasis.  Stable sclerotic focus in the T5 vertebral body, possibly a bone island as this was not hypermetabolic on prior PET-CT. Small hiatal hernia. RECIST SUMMARY: Date of prior examination for comparison 01/30/2018 Lesion 1: Right upper lobe 1.3 cm Series 2 image 31 prior measurement 1.3 cm". Bone scan also from 6/4/18 revealed no evidence of mets.     Her PET scan from 7/11/18 revealed "Right suprahilar lesion SUV max 3.76, previously 6.86. Pretracheal lymph node SUV max 2.55, previously 3.79. There is physiologic intracranial, head, and neck activity.  There is muscle activation.  There is vocal cord activation.  There is physiologic liver, spleen, GI and  activity.  There is a hiatal hernia.  Pelvic organs show nothing unusual.  No bone lesions are seen.  There are areas of benign appearing lung scar"  She notes fatigue.  She denies any nausea, vomiting, diarrhea, constipation, abdominal pain, weight loss or loss of appetite, chest pain, shortness of breath, leg swelling, fatigue, pain, headache, dizziness, or mood changes. Her ECOG PS is 2. She is accompanied by her  and son     She has been on Tarceva since 6/5/18. She has now completed SBRT to her right lung lesions.      HPI She comes in to review her MRI brain from 11/26/19 which reveals "Stable postsurgical changes of left frontal lobe mass resection.  No evidence of residual or recurrent malignancy. Right cerebellar encephalomalacia consistent with prior infarct. Mucosal thickening and layering fluid within the paranasal sinuses which may reflect acute sinusitis"  PET scan 11/26/19 reveals "New hypermetabolic pretracheal lymph node concerning for recurrent metastatic lung cancer. Two new small ground-glass lesions " "in the right upper lobe without corresponding increased radiotracer uptake, although likely too small to characterize with PET-CT.  Recommend attention on follow-up. Focal non physiologic uptake in the right hemicolon. Findings are nonspecific but could represent underlying polyp. Further evaluation could be performed with colonoscopy if clinically warranted"  She noted cough and some bronchitis in October 2019 which has since resolved. Notes fatigue.     Interval History:   - Appears much better when seen today AM.   - No fever spikes in the last 24 hours - TMax 99.8.  - No blood in stool.  - No change in bowel or bladder movements, no nausea/ vomiting/ dizziness.   - Had a session with PT yesterday - ambulating well.   - Labs stable - Hgb trending up, WBCs trending low.      Medications:  Continuous Infusions:  Scheduled Meds:   amitriptyline  50 mg Oral QHS    apixaban  5 mg Oral BID    calcitRIOL  0.5 mcg Oral Daily    calcium carbonate  1,000 mg Oral Daily    [START ON 1/22/2020] ciprofloxacin HCl  750 mg Oral Q12H    dronabinol  5 mg Oral BID AC    levETIRAcetam  500 mg Oral BID    lipase-protease-amylase 12,000-38,000-60,000 units  1 capsule Oral TID WM    LORazepam  1 mg Oral QHS    polyethylene glycol  17 g Oral Daily      PRN Meds:Dextrose 10% Bolus, Dextrose 10% Bolus, glucagon (human recombinant), glucose, glucose, ondansetron, prochlorperazine, sodium chloride 0.9%      Review of Systems   Constitutional: Negative for fever. Negative for chills and fatigue.   HENT: Negative for rhinorrhea and trouble swallowing.    Eyes: Negative for photophobia and visual disturbance.   Respiratory: Negative for cough and shortness of breath.    Cardiovascular: Negative for chest pain and leg swelling.   Gastrointestinal: Negative for anal bleeding. Negative for abdominal distention, abdominal pain, diarrhea and nausea.   Genitourinary: Negative for difficulty urinating and dysuria.   Musculoskeletal: " Negative for arthralgias and myalgias.   Allergic/Immunologic: Positive for immunocompromised state.   Neurological: Negative for syncope and headaches.   Psychiatric/Behavioral: Negative for behavioral problems and confusion.        Vital Signs (Most Recent):  Temp: 98.5 °F (36.9 °C) (01/21/20 0757)  Pulse: 91 (01/21/20 0757)  Resp: 18 (01/21/20 0757)  BP: (!) 147/73 (01/21/20 0757)  SpO2: 98 % (01/21/20 0757) Vital Signs (24h Range):  Temp:  [98.3 °F (36.8 °C)-99.8 °F (37.7 °C)] 98.5 °F (36.9 °C)  Pulse:  [82-95] 91  Resp:  [15-18] 18  SpO2:  [94 %-98 %] 98 %  BP: (132-147)/(61-73) 147/73     Weight: 63.5 kg (140 lb)  Body mass index is 22.6 kg/m².  Body surface area is 1.72 meters squared.      Intake/Output Summary (Last 24 hours) at 1/21/2020 0909  Last data filed at 1/21/2020 0851  Gross per 24 hour   Intake 490 ml   Output 500 ml   Net -10 ml       Physical Exam   Constitutional: She is oriented to person, place, and time. She appears well-developed. No distress.   HENT:   Head: Normocephalic and atraumatic.   Eyes: EOM are normal.   Neck: Normal range of motion. Neck supple.   Cardiovascular: Normal rate and regular rhythm.   Pulmonary/Chest: Effort normal. No respiratory distress.   Abdominal: Soft. She exhibits no distension. There is no tenderness.   Pt refused rectal exam.    Musculoskeletal: She exhibits edema (1+ BLE up to knees). She exhibits no tenderness.   Neurological: She is alert and oriented to person, place, and time.   Skin: Skin is warm and dry.   Psychiatric: She has a normal mood and affect. Her behavior is normal.   Nursing note and vitals reviewed.      Significant Labs:   CBC:   Recent Labs   Lab 01/20/20 0322 01/21/20  0427   WBC 6.73 5.09   HGB 7.7* 8.4*   HCT 25.6* 28.1*    168    and CMP:   Recent Labs   Lab 01/20/20 0322 01/21/20  0427    140   K 4.0 4.7   * 115*   CO2 22* 18*   GLU 81 84   BUN 16 15   CREATININE 1.7* 1.6*   CALCIUM 6.6* 7.1*   PROT 4.4* 5.2*    ALBUMIN 1.9* 2.1*   BILITOT 1.0 0.8   ALKPHOS 178* 226*   AST 70* 62*   * 92*   ANIONGAP 6* 7*   EGFRNONAA 29.1* 31.3*       Diagnostic Results:  I have reviewed and interpreted all pertinent imaging results/findings within the past 24 hours.    * Bacteremia due to Klebsiella pneumoniae  Patient is a 74 year old woman with metastatic lung adenocarcinoma who presents with fevers. She was febrile up to 104.3F in the ED and tachycardic upon admission. Otherwise, other vitals were stable. She had a normal WBC.      Blood cultures growing gram negative rods. Unclear source as UA is negative, CXR non-revealing and CT chest-abd-pelvis is unremarkable. Possibly biliary? Pt does have a history of whipple's procedure. LFTs and T bilis elevated on admission. Pt is asymptomatic other than the fever which is now subsiding. WBC trending down.      Plan:  - Patient received 30mg/kg IVF in ED. First lactic acid WNL but second one elevated to 3.5. Repeat lactic acid is now down to normal.  - CXR showed no acute process, UA not suggestive of UTI. Flu was negative.  - Blood cultures growing Klebsiella pneumonia. Pansensitive.  - Day 2 of Ciprofloxacin 400 mg iv   - Cultures from 1/19 negative for >48 hours.   - Plan to discharge on Ciprofloxacin 750mg Q12h for 14days     Hyperchloremic metabolic acidosis  Pt noted to have worsening HCO3 with hyperchloremia. AG of 10.  Likely RTA in the setting of CKD +/- medication induced  - Urine elctrolytes- Unreliable UAG in the setting of CKD  - received 1 L of Bicarb on 1/18. HCO3 improved to 22    - Pt reports being on sodium bicarb tabs at home but they were discontinued last year.   - Monitor HCO3 and if still trending down will resume sodium bicarb tabs.      Elevated LFTs  Patient's LFTs on admission were elevated (AST 1300,  from prior a month ago AST 39, ). She reports no new medication additions or changes.     Plan:  - Abdominal US: Mild intrahepatic biliary  ductal dilatation presumably secondary to patient's Whipple procedure.  - Acute hepatitis panel is negative. Unclear etiology but possible sepsis causing elevation in LFTs.  - Hold home atorvastatin for now  - Trend daily CMP, improving now.      History of deep vein thrombosis (DVT) of lower extremity  Continue home Eliquis 5mg BID     Insomnia  Continue home amitriptyline 50mg QHS     Dyslipidemia  Holding home atorvastatin in the setting of elevated LFTs. Resume when appropriate.     Debility  PT/OT consulted, appreciate assistance     CKD (chronic kidney disease) stage 3, GFR 30-59 ml/min  It appears from past year, Cr mostly ranged from 1.7-1.9. On admission, Cr 1.5.     - Monitor daily CMP. Cr stable today  - Avoid nephrotoxic agents.      Hypomagnesemia  Replete electrolytes PRN     Hypocalcemia  Pt reports being on calcium tabs at home, possibly secondary to CKD.   Ca today 6.7--> Corrected 8.3  - Calcium gluconate 1 gr IV today  - Low vit D levels.  - Start Calcitriol and TUMS 1000 mg daily.     Malignant neoplasm of lower lobe of right lung  Patient is a 74 year old woman with metastatic lung adenocarcinoma on Xgeva and Tarceva who follows with Dr. Vazquez in clinic.  - Per pharmacy, only staff can order Tarceva  - Will need outpatient follow up with Dr. Vazquez upon discharge     Meningioma  Patient is s/p resection in 2016  Has been on keppra as seizure prophylaxis since - continue home keppra 500mg BID     Essential hypertension  Hold home losartan and metoprolol in the setting of sepsis. Resume when appropriate.     Deirdre Reynolds MS  Hematology/Oncology  [facility]

## 2020-01-21 NOTE — SUBJECTIVE & OBJECTIVE
Interval History:   Afebrile over past 24h  Last +ve blood culture 01/17  Hgb stable.    Oncology Treatment Plan:   [No treatment plan]    Medications:  Continuous Infusions:  Scheduled Meds:   amitriptyline  50 mg Oral QHS    apixaban  5 mg Oral BID    calcitRIOL  0.5 mcg Oral Daily    calcium carbonate  1,000 mg Oral Daily    [START ON 1/22/2020] ciprofloxacin HCl  750 mg Oral Q12H    dronabinol  5 mg Oral BID AC    levETIRAcetam  500 mg Oral BID    lipase-protease-amylase 12,000-38,000-60,000 units  1 capsule Oral TID WM    LORazepam  1 mg Oral QHS    polyethylene glycol  17 g Oral Daily     PRN Meds:Dextrose 10% Bolus, Dextrose 10% Bolus, glucagon (human recombinant), glucose, glucose, ondansetron, prochlorperazine, sodium chloride 0.9%     Review of Systems   Constitutional: Positive for fever. Negative for chills and fatigue.   HENT: Negative for rhinorrhea and trouble swallowing.    Eyes: Negative for photophobia and visual disturbance.   Respiratory: Negative for cough and shortness of breath.    Cardiovascular: Negative for chest pain and leg swelling.   Gastrointestinal: Positive for anal bleeding. Negative for abdominal distention, abdominal pain, diarrhea and nausea.   Genitourinary: Negative for difficulty urinating and dysuria.   Musculoskeletal: Negative for arthralgias and myalgias.   Allergic/Immunologic: Positive for immunocompromised state.   Neurological: Negative for syncope and headaches.   Psychiatric/Behavioral: Negative for behavioral problems and confusion.     Objective:     Vital Signs (Most Recent):  Temp: 98.5 °F (36.9 °C) (01/21/20 0757)  Pulse: 91 (01/21/20 0757)  Resp: 18 (01/21/20 0757)  BP: (!) 147/73 (01/21/20 0757)  SpO2: 98 % (01/21/20 0757) Vital Signs (24h Range):  Temp:  [98.3 °F (36.8 °C)-99.8 °F (37.7 °C)] 98.5 °F (36.9 °C)  Pulse:  [82-95] 91  Resp:  [15-18] 18  SpO2:  [94 %-98 %] 98 %  BP: (132-147)/(61-73) 147/73     Weight: 63.5 kg (140 lb)  Body mass index is  22.6 kg/m².  Body surface area is 1.72 meters squared.      Intake/Output Summary (Last 24 hours) at 1/21/2020 0909  Last data filed at 1/21/2020 0851  Gross per 24 hour   Intake 490 ml   Output 500 ml   Net -10 ml       Physical Exam   Constitutional: She is oriented to person, place, and time. She appears well-developed. No distress.   HENT:   Head: Normocephalic and atraumatic.   Eyes: EOM are normal.   Neck: Normal range of motion. Neck supple.   Cardiovascular: Normal rate and regular rhythm.   Pulmonary/Chest: Effort normal. No respiratory distress.   Abdominal: Soft. She exhibits no distension. There is no tenderness.   Pt refused rectal exam.    Musculoskeletal: She exhibits edema (1+ BLE up to knees). She exhibits no tenderness.   Neurological: She is alert and oriented to person, place, and time.   Skin: Skin is warm and dry.   Psychiatric: She has a normal mood and affect. Her behavior is normal.   Nursing note and vitals reviewed.      Significant Labs:   CBC:   Recent Labs   Lab 01/20/20  0322 01/21/20  0427   WBC 6.73 5.09   HGB 7.7* 8.4*   HCT 25.6* 28.1*    168    and CMP:   Recent Labs   Lab 01/20/20  0322 01/21/20  0427    140   K 4.0 4.7   * 115*   CO2 22* 18*   GLU 81 84   BUN 16 15   CREATININE 1.7* 1.6*   CALCIUM 6.6* 7.1*   PROT 4.4* 5.2*   ALBUMIN 1.9* 2.1*   BILITOT 1.0 0.8   ALKPHOS 178* 226*   AST 70* 62*   * 92*   ANIONGAP 6* 7*   EGFRNONAA 29.1* 31.3*       Diagnostic Results:  I have reviewed and interpreted all pertinent imaging results/findings within the past 24 hours.

## 2020-01-21 NOTE — DISCHARGE INSTRUCTIONS
:  [facility], [phone], to provide skilled nursing assessments, aide visits, physical and occupational therapy. Services will begin with the skilled nursing admission assessment visit on the day after discharge from the hospital.  RAMAN Vanegas, [facility]  [phone]

## 2020-01-21 NOTE — DISCHARGE SUMMARY
"[facility]  Hematology/Oncology  Discharge Summary      Patient Name: Naty St  MRN: [id number]  Admission Date: 1/16/2020  Hospital Length of Stay: 5 days  Discharge Date and Time:  01/21/2020 10:54 AM  Attending Physician: Yahaira Najera MD   Discharging Provider: Solange Nicholas MD  Primary Care Provider: Olvin Mars MD    HPI: Ms. St is a 74 year old woman with metastatic R lung adenocarcinoma on Xgeva and Tarceva, breast ca s/p L lumpectomy, meningioma s/p resection, pancreatic ca s/p whipple, and s/p hysterectomy w/ b/l salpingo-oophorectomy, HTN who presents with fevers. She follows with Dr. Vazquez in clinic. Patient says that this past Saturday, she felt as if the "room was spinning", and this stopped on its own. She has experienced this before. She says she has been feeling "hot" for the past week. Her family ( and son) at bedside say that she has been having chills, patient feels weak and that she has looked pale. Patient says that she has been having nausea, vomiting and dry cough. She also says she has been having a runny nose, headaches, and bilateral lower extremity swelling (that has been chronic). She denies diarrhea, dysuria, new rashes, muscle aches. She states that she had the flu shot this year. She denies changes in medications or new additions with the exception of Tarsiva being changed to the generic brand. She denies being around recent sick contacts or travel.     Patient is still receiving Xgeva (about once every 6 weeks, she believes last received around 12/12/19). She reports that Tagrisso was stopped.     ED course: Patient was febrile to 104.3F as well as tachycardic, and labs were notable for elevated LFTs. UA was not suggestive of UTI, and CXR did not show an acute process. He procal was elevated to 2.52. Flu was negative. Her initial lactic acid was WNL but the second one was elevated to 3.5. She received IVF, electrolyte " "replacement and a dose of zosyn. Patient states that she feels much better, and her family notes that she looks improved since initially presenting to the ED.    Oncologic History (per Dr. Vazquez's note 12/4/2019):  Ms. Naty St was admitted to the hospital between 01/25/2016 and 01/28/2016. She has a history of pancreatic cancer 17 years ago, breast cancer and meningioma, presented to the hospital complaining of loss of consciousness. The patient apparently was in her normal state of health and she stood up to go to the bathroom and fell to the floor. The patient's daughter helped the mother up in the bathroom and noted that her mother's upper extremities were shaking and eye rolling. No reports of bowel or bladder incontinence, tongue biting or rolling. The second episode lasted about four minutes and she was extremely lethargic following that. Apparently, the patient had a meningioma resection done in the past; however, recently, underwent neurosurgical resection with Dr. 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    Additional immunohistochemical stains were performed which revealed the tumor cells to be are positive for TTF1 and negative for ER and GCDFP. The morphology and TTF1 positivity are most consistent with lung primary.    Of note, imaging scan at the end of January 2016 revealed a mass in the lung at 2 cm in the medial aspect of the apical segment of the right upper lobe abutting the mediastinum at the level of the azygous vein and abutting and possibly encasing the segmental bronchi and vessels of the apical segment of the right upper lobe and no pleural fluid was present. Also, there is an enlarged right paratracheal lymph node. No evidence of any metastatic disease at the pancreatic site with postoperative changes post Whipple disease  Her PET Scan from 2/15/16 reveal "Hypermetabolic " "mass in the right lung apex consistent with a primary malignancy. Hypermetabolic mediastinal lymph nodes consistent with metastatic disease. Right sacral hypermetabolic lesion consistent with metastatic disease, noting additional mildly sclerotic lesions in multiple vertebral bodies which do not demonstrate abnormal hypermetabolism  She underwent IR bone biopsy which revealed metastatic adenocarcinoma of lung origin. 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  She has completed focal RT to brain lesions in March 2016.    PET scan from 6/6/16 shows "Dramatic almost complete response to therapy."  Lovenox started after US lower ext 6/7/16 shows Acute complete occlusion of one of the left posterior tibial vein.Remote partial thrombus of the proximal left superficial femoral vein.  She is on Tarceva.   12/6/16 PET scan reveals "Stable right upper lobe lesion and right hilar lymph node. No new lesions identified. Trace left pleural effusion".  1/27/17 Renal u/s "Medical renal disease. Nonobstructive right nephrolithiasis"  1/31/17 PET - "Right upper lobe nodule and right hilar lymph node similar and very low grade activity.  There is no definite evidence of recurrence."   11/9/17 PET "In this patient with history of lung cancer, there is interval increase in size and hypermetabolism of a right upper lobe lung lesion concerning for recurrent disease. Additionally, there is interval appearance of a hypermetabolic paratracheal lymph node suspicious for metastatic disease"     She progressed on Tarceva She underwent EBUS on 12/5/17. Pathology revealed adenocarcinoma but T790m could not be done as quantity was not insufficient. Her PET scan from 1/30/18 revealed The patient's previously identified abnormal lesions is slightly greater uptake of FDG on today's study compared with prior exam. These findings are concerning for progression of disease"     She was hospitalized between 4/9/18-4/16/18. Her hospital course was as " "follows "[facility] with acute hypoxic respiratory failure. She was requiring 4 L of nc on admission. She was started on moxi for CAP. She received one dose of ceftriaxone in the ED. On 2nd day of admission she spiked a fever. Her Hb was ~7 g/dl so she was transfused 1 unit of PRBCs. Over night she developed worsening of her symptoms with increased O2 requirements to 15 L HFNC. Her CXR showed worsening congestion. There was a concern of TRALI vs TACO. New 2D echo with diastolic dysfunction. She was given IV lasix pushes as needed and was started on dexamethasone.  Her abx was escalated to vanc and cefepime. She improved with diuresis and steroids. Pulmonary were consulted. Her O2 requirements continued to improve. On 4/15 she was switched to Augmentin. PT recommended discharging her to SNF but the patient refused and she wanted to go home with home health. She qualified for home oxygen so she was discharged on 3 L nc while at rest and 6 while active. Augmentin and dexamethasone were discontinued on discharge"     She was readmitted from 4/27 to 5/3/18 with who presented to the ED with a complaint of fatigue and worsening DELA CRUZ. Pt diagnosed PNA 4/9 and was discharged on 4/16 on 3L oxygen. She was initially placed on cefepime for 3 days followed by an add'l 2 days of Augmentin. Pulm was consulted with assistance as her oxygen requirements were increasing. She was placed on oral steroids, then trailed on 80mg TID solumedrol for 2 days and will be discharged on a prednisone taper. She was also diuresed with 2 days of 40mg IV lasix with appropriate UOP resulting, improvement on repeat CXR. She was medically stable and appropriate for DC home with home health on 1L continuous O2. She will be seen for close f/u and may be able to come off oxygen at that time.      She discontinued Tagrisso in April 2018. Restaging scans from 6/4/18 revealed "Stable appearance of a spiculated right upper " "lobe pulmonary lesion.  Additional irregular focus superior to the lesion in the right lung apex is stable and may represent scar or additional lesion; although this was not hypermetabolic on prior PET-CT.  No new pulmonary lesions. 1.3 cm subcarinal lymph node, not hypermetabolic on prior PET-CT. Stable postsurgical changes of a Whipple procedure. Bilateral nonobstructing nephrolithiasis.  Stable sclerotic focus in the T5 vertebral body, possibly a bone island as this was not hypermetabolic on prior PET-CT. Small hiatal hernia. RECIST SUMMARY: Date of prior examination for comparison 01/30/2018 Lesion 1: Right upper lobe 1.3 cm Series 2 image 31 prior measurement 1.3 cm". Bone scan also from 6/4/18 revealed no evidence of mets.     Her PET scan from 7/11/18 revealed "Right suprahilar lesion SUV max 3.76, previously 6.86. Pretracheal lymph node SUV max 2.55, previously 3.79. There is physiologic intracranial, head, and neck activity.  There is muscle activation.  There is vocal cord activation.  There is physiologic liver, spleen, GI and  activity.  There is a hiatal hernia.  Pelvic organs show nothing unusual.  No bone lesions are seen.  There are areas of benign appearing lung scar"  She notes fatigue.  She denies any nausea, vomiting, diarrhea, constipation, abdominal pain, weight loss or loss of appetite, chest pain, shortness of breath, leg swelling, fatigue, pain, headache, dizziness, or mood changes. Her ECOG PS is 2. She is accompanied by her  and son     She has been on Tarceva since 6/5/18. She has now completed SBRT to her right lung lesions.      HPI She comes in to review her MRI brain from 11/26/19 which reveals "Stable postsurgical changes of left frontal lobe mass resection.  No evidence of residual or recurrent malignancy. Right cerebellar encephalomalacia consistent with prior infarct. Mucosal thickening and layering fluid within the paranasal sinuses which may reflect acute " "sinusitis"  PET scan 11/26/19 reveals "New hypermetabolic pretracheal lymph node concerning for recurrent metastatic lung cancer. Two new small ground-glass lesions in the right upper lobe without corresponding increased radiotracer uptake, although likely too small to characterize with PET-CT.  Recommend attention on follow-up. Focal non physiologic uptake in the right hemicolon. Findings are nonspecific but could represent underlying polyp. Further evaluation could be performed with colonoscopy if clinically warranted"  She noted cough and some bronchitis in October 2019 which has since resolved. Notes fatigue.    * No surgery found *     Hospital Course: Pt with R lung adenocarcinoma admitted with sepsis. Started on vanc zosyn.  Blood cultures positive for Klebsiella. Negative culture on 01/19, last +ve culture 01/17.   No source was identified. UA, CXR unremarkable, CT chest/'abdomen/pelvis with no acute findings.   WBC trending down and patient reports feeling better. Afebrile > 48h.     Bacteremia due to Klebsiella pneumoniae: Patient is a 74 year old woman with metastatic lung adenocarcinoma who presents with fevers. She was febrile up to 104.3F in the ED and tachycardic upon admission. Otherwise, other vitals were stable. She had a normal WBC.  Blood cultures +ve for Klebsiella pneumonia. Pansensitive. Zosyn switched to Ciprofloxacin 400 mg IV BID. Cultures from 1/19 negative for >48 hours prior to discharge. Patiene will be discharged with 12 days of Ciprofloxacin 750mg BID to complete 14day treatment since negative culltures.    Elevated LFTs  Abdominal US: Mild intrahepatic biliary ductal dilatation presumably secondary to patient's Whipple procedure. Acute hepatitis panel is negative. Likely DILI. Held home atorvastatin. Daily CMP improved on dc     History of deep vein thrombosis (DVT) of lower extremity  Continue home Eliquis 5mg BID     Insomnia  Continue home amitriptyline 50mg " QHS     Dyslipidemia  Holding home atorvastatin in the setting of elevated LFTs. Resume when LFTs normalize in as out patient.     Debility  Home health orders signed     Malignant neoplasm of lower lobe of right lung  Patient will hold her anti cancer treatment until resolution of infection and follow up with Dr. Vazquez.    Physical Exam   Constitutional: She is oriented to person, place, and time. She appears well-developed. No distress.   HENT:   Head: Normocephalic and atraumatic.   Eyes: EOM are normal.   Neck: Normal range of motion. Neck supple.   Cardiovascular: Normal rate and regular rhythm.   Pulmonary/Chest: Effort normal. No respiratory distress.   Abdominal: Soft. She exhibits no distension. There is no tenderness.   Pt refused rectal exam.    Musculoskeletal: She exhibits edema (1+ BLE up to knees). She exhibits no tenderness.   Neurological: She is alert and oriented to person, place, and time.   Skin: Skin is warm and dry.   Psychiatric: She has a normal mood and affect. Her behavior is normal.   Nursing note and vitals reviewed.       Consults:     Significant Diagnostic Studies: Labs:   CMP   Recent Labs   Lab 01/20/20  0322 01/21/20  0427    140   K 4.0 4.7   * 115*   CO2 22* 18*   GLU 81 84   BUN 16 15   CREATININE 1.7* 1.6*   CALCIUM 6.6* 7.1*   PROT 4.4* 5.2*   ALBUMIN 1.9* 2.1*   BILITOT 1.0 0.8   ALKPHOS 178* 226*   AST 70* 62*   * 92*   ANIONGAP 6* 7*   ESTGFRAFRICA 33.5* 36.1*   EGFRNONAA 29.1* 31.3*    and CBC   Recent Labs   Lab 01/20/20  0322 01/21/20  0427   WBC 6.73 5.09   HGB 7.7* 8.4*   HCT 25.6* 28.1*    168     Radiology: CT scan: CT ABDOMEN PELVIS WITH CONTRAST: No results found for this visit on 01/16/20.    Pending Diagnostic Studies:     None        Final Active Diagnoses:    Diagnosis Date Noted POA    PRINCIPAL PROBLEM:  Bacteremia due to Klebsiella pneumoniae [R78.81] 06/18/2016 Yes    Hyperchloremic metabolic acidosis [E87.2] 01/18/2020 Unknown     Febrile illness, acute [R50.9] 01/16/2020 Yes    Elevated LFTs [R94.5] 01/16/2020 Yes    History of deep vein thrombosis (DVT) of lower extremity [Z86.718] 09/21/2018 Not Applicable    Insomnia [G47.00] 09/21/2018 Yes    Dyslipidemia [E78.5] 05/24/2018 Yes    Debility [R53.81] 04/27/2018 Yes    CKD (chronic kidney disease) stage 3, GFR 30-59 ml/min [N18.3] 04/07/2017 Yes    Hypomagnesemia [E83.42] 02/13/2017 Yes    Hypocalcemia [E83.51] 09/09/2016 Yes    Malignant neoplasm of lower lobe of right lung [C34.31] 03/17/2016 Yes    Meningioma [D32.9] 01/12/2016 Yes     Chronic    Essential hypertension [I10] 08/25/2014 Yes      Problems Resolved During this Admission:      Discharged Condition: good    Disposition: Home-Health Care [facility]    Follow Up:    Patient Instructions:   No discharge procedures on file.  Medications:  Reconciled Home Medications:      Medication List      ASK your doctor about these medications    albuterol-ipratropium 2.5 mg-0.5 mg/3 mL nebulizer solution  Commonly known as:  DUO-NEB  Take 3 mLs by nebulization every 6 (six) hours as needed for Wheezing or Shortness of Breath. Rescue     amitriptyline 50 MG tablet  Commonly known as:  ELAVIL  Take 1 tablet (50 mg total) by mouth every evening.     atorvastatin 40 MG tablet  Commonly known as:  LIPITOR  Take 1 tablet (40 mg total) by mouth once daily.     clindamycin phosphate 1% 1 % gel  Commonly known as:  CLINDAGEL  APPLY TOPICALLY TWICE DAILY     Creon Cpdr  Generic drug:  lipase-protease-amylase 12,000-38,000-60,000 units  TAKE ONE CAPSULE BY MOUTH THREE TIMES A DAY WITH MEALS     denosumab 120 mg/1.7 mL (70 mg/mL) Soln  Commonly known as:  XGEVA  Inject 120 mg into the skin every 28 days.     * doxycycline 100 MG tablet  Commonly known as:  VIBRA-TABS  Take 1 tablet (100 mg total) by mouth every 12 (twelve) hours.     * doxycycline 100 MG Cap  Commonly known as:  VIBRAMYCIN  Take 1 capsule (100 mg total) by mouth every 12  (twelve) hours.     dronabinol 5 MG capsule  Commonly known as:  MARINOL  Take 1 capsule (5 mg total) by mouth 2 (two) times daily before meals.     Eliquis 5 mg Tab  Generic drug:  apixaban  TAKE ONE TABLET BY MOUTH TWICE DAILY     erlotinib 150 MG tablet  Commonly known as:  Tarceva  Take 1 tablet (150 mg total) by mouth once daily.     levETIRAcetam 500 MG Tab  Commonly known as:  KEPPRA  TAKE ONE TABLET BY MOUTH TWICE DAILY     LORazepam 1 MG tablet  Commonly known as:  ATIVAN  Take 1 tablet (1 mg total) by mouth 2 (two) times daily.     losartan 50 MG tablet  Commonly known as:  COZAAR  TAKE ONE TABLET BY MOUTH TWICE DAILY     metoprolol succinate 50 MG 24 hr tablet  Commonly known as:  TOPROL-XL  TAKE ONE TABLET BY MOUTH ONCE DAILY. hold UNTIL your appointment WITH your pcp. heart rate and blood pressure has been normal off OF this me     multivitamin per tablet  Commonly known as:  THERAGRAN  Take 1 tablet by mouth once daily.     Myrbetriq 50 mg Tb24  Generic drug:  mirabegron  TAKE ONE TABLET BY MOUTH ONCE DAILY     polyethylene glycol 17 gram/dose powder  Commonly known as:  GLYCOLAX     senna-docusate 8.6-50 mg 8.6-50 mg per tablet  Commonly known as:  Senna Laxative-Stool Softener  Take 1 tablet by mouth once daily.         * This list has 2 medication(s) that are the same as other medications prescribed for you. Read the directions carefully, and ask your doctor or other care provider to review them with you.                Solange Nicholas MD  Hematology/Oncology  [facility]    Attending Note  See daily progress note  Agree with above

## 2020-01-21 NOTE — PLAN OF CARE
Follow-up appointments scheduled as listed below.    Future Appointments   Date Time Provider Department Center   1/24/2020  9:30 AM NOM PET CT LIMIT 500 LBS [facility] PET CT [facility]   1/30/2020  8:00 AM Willie Zelaya DNP [facility]   1/30/2020  9:40 AM LAB, HEMONC CANCER BLDG [facility]   1/30/2020 10:45 AM Karrie Vazquez MD [facility] HEM ONC [facility]   1/30/2020 11:30 AM INJECTION, [facility] INFUSION [facility]   2/3/2020  1:00 PM Genesis Souza NP [facility] IM Reece ruben [facility]     Rina [facility], RN, BSN, CM  Utilization Management  [facility]

## 2020-01-21 NOTE — PROGRESS NOTES
Attended rounds on patient this morning with Dr. Najera's Medical Oncology team. Patient is confirmed for discharge home today. Requested orders for home health services - skilled nursing assessments, aide visits, physical and occupational therapy - and orders were written by the Fellow Dr. Nicholas. Spoke with patient following rounds to discuss discharge plans and home health arrangements in progress. Patient stated agreement. Her family will be coming to the hospital to pick her up. Provided patient with an additional business card with my contact information on it and encouraged for her or her family to call me as needed. Spoke with [facility] liason nurse [place], who confirmed that the referral is complete, she has the orders and has spoken with patient, and services are scheduled to begin on tomorrow with the nurse admission assessment visit. Spoke with patient's nurse Lucero re: discharge plans and arrangements. No other discharge needs indicated at this time.

## 2020-01-21 NOTE — PLAN OF CARE
Plan of care reviewed with the patient at the beginning of shift. The patient is alert and oriented. GCS 15. Denying complaints at this time. NAEON. Tmax 99.8, VSS. Independent and ambulatory. Remained free from falls and injuries throughout shift. Bed in low locked position. Call bell and personal items within reach. Will continue to monitor.

## 2020-01-21 NOTE — PROGRESS NOTES
"[facility]  Hematology/Oncology  Progress Note    Patient Name: Naty St  Admission Date: 1/16/2020  Hospital Length of Stay: 5 days  Code Status: Prior     Subjective:     HPI:  Ms. St is a 74 year old woman with metastatic R lung adenocarcinoma on Xgeva and Tarceva, breast ca s/p L lumpectomy, meningioma s/p resection, pancreatic ca s/p whipple, and s/p hysterectomy w/ b/l salpingo-oophorectomy, HTN who presents with fevers. She follows with Dr. Vazquez in clinic. Patient says that this past Saturday, she felt as if the "room was spinning", and this stopped on its own. She has experienced this before. She says she has been feeling "hot" for the past week. Her family ( and son) at bedside say that she has been having chills, patient feels weak and that she has looked pale. Patient says that she has been having nausea, vomiting and dry cough. She also says she has been having a runny nose, headaches, and bilateral lower extremity swelling (that has been chronic). She denies diarrhea, dysuria, new rashes, muscle aches. She states that she had the flu shot this year. She denies changes in medications or new additions with the exception of Tarsiva being changed to the generic brand. She denies being around recent sick contacts or travel.     Patient is still receiving Xgeva (about once every 6 weeks, she believes last received around 12/12/19). She reports that Tagrisso was stopped.     ED course: Patient was febrile to 104.3F as well as tachycardic, and labs were notable for elevated LFTs. UA was not suggestive of UTI, and CXR did not show an acute process. He procal was elevated to 2.52. Flu was negative. Her initial lactic acid was WNL but the second one was elevated to 3.5. She received IVF, electrolyte replacement and a dose of zosyn. Patient states that she feels much better, and her family notes that she looks improved since initially presenting to the ED.    Oncologic " "History (per Dr. Vazquez's note 12/4/2019):  Ms. Naty St was admitted to the hospital between 01/25/2016 and 01/28/2016. She has a history of pancreatic cancer 17 years ago, breast cancer and meningioma, presented to the hospital complaining of loss of consciousness. The patient apparently was in her normal state of health and she stood up to go to the bathroom and fell to the floor. The patient's daughter helped the mother up in the bathroom and noted that her mother's upper extremities were shaking and eye rolling. No reports of bowel or bladder incontinence, tongue biting or rolling. The second episode lasted about four minutes and she was extremely lethargic following that. Apparently, the patient had a meningioma resection done in the past; however, recently, underwent neurosurgical resection with Dr. 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    Additional immunohistochemical stains were performed which revealed the tumor cells to be are positive for TTF1 and negative for ER and GCDFP. The morphology and TTF1 positivity are most consistent with lung primary.    Of note, imaging scan at the end of January 2016 revealed a mass in the lung at 2 cm in the medial aspect of the apical segment of the right upper lobe abutting the mediastinum at the level of the azygous vein and abutting and possibly encasing the segmental bronchi and vessels of the apical segment of the right upper lobe and no pleural fluid was present. Also, there is an enlarged right paratracheal lymph node. No evidence of any metastatic disease at the pancreatic site with postoperative changes post Whipple disease  Her PET Scan from 2/15/16 reveal "Hypermetabolic mass in the right lung apex consistent with a primary malignancy. Hypermetabolic mediastinal lymph nodes consistent with metastatic disease. Right sacral hypermetabolic " "lesion consistent with metastatic disease, noting additional mildly sclerotic lesions in multiple vertebral bodies which do not demonstrate abnormal hypermetabolism  She underwent IR bone biopsy which revealed metastatic adenocarcinoma of lung origin. 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  She has completed focal RT to brain lesions in March 2016.    PET scan from 6/6/16 shows "Dramatic almost complete response to therapy."  Lovenox started after US lower ext 6/7/16 shows Acute complete occlusion of one of the left posterior tibial vein.Remote partial thrombus of the proximal left superficial femoral vein.  She is on Tarceva.   12/6/16 PET scan reveals "Stable right upper lobe lesion and right hilar lymph node. No new lesions identified. Trace left pleural effusion".  1/27/17 Renal u/s "Medical renal disease. Nonobstructive right nephrolithiasis"  1/31/17 PET - "Right upper lobe nodule and right hilar lymph node similar and very low grade activity.  There is no definite evidence of recurrence."   11/9/17 PET "In this patient with history of lung cancer, there is interval increase in size and hypermetabolism of a right upper lobe lung lesion concerning for recurrent disease. Additionally, there is interval appearance of a hypermetabolic paratracheal lymph node suspicious for metastatic disease"     She progressed on Tarceva She underwent EBUS on 12/5/17. Pathology revealed adenocarcinoma but T790m could not be done as quantity was not insufficient. Her PET scan from 1/30/18 revealed The patient's previously identified abnormal lesions is slightly greater uptake of FDG on today's study compared with prior exam. These findings are concerning for progression of disease"     She was hospitalized between 4/9/18-4/16/18. Her hospital course was as follows "Patient was admitted to hemonc service on 4/9 with acute hypoxic respiratory failure. She was requiring 4 L of nc on admission. She was started on moxi for CAP. She " "received one dose of ceftriaxone in the ED. On 2nd day of admission she spiked a fever. Her Hb was ~7 g/dl so she was transfused 1 unit of PRBCs. Over night she developed worsening of her symptoms with increased O2 requirements to 15 L HFNC. Her CXR showed worsening congestion. There was a concern of TRALI vs TACO. New 2D echo with diastolic dysfunction. She was given IV lasix pushes as needed and was started on dexamethasone.  Her abx was escalated to vanc and cefepime. She improved with diuresis and steroids. Pulmonary were consulted. Her O2 requirements continued to improve. On 4/15 she was switched to Augmentin. PT recommended discharging her to SNF but the patient refused and she wanted to go home with home health. She qualified for home oxygen so she was discharged on 3 L nc while at rest and 6 while active. Augmentin and dexamethasone were discontinued on discharge"     She was readmitted from 4/27 to 5/3/18 with who presented to the ED with a complaint of fatigue and worsening DELA CRUZ. Pt diagnosed PNA 4/9 and was discharged on 4/16 on 3L oxygen. She was initially placed on cefepime for 3 days followed by an add'l 2 days of Augmentin. Pulm was consulted with assistance as her oxygen requirements were increasing. She was placed on oral steroids, then trailed on 80mg TID solumedrol for 2 days and will be discharged on a prednisone taper. She was also diuresed with 2 days of 40mg IV lasix with appropriate UOP resulting, improvement on repeat CXR. She was medically stable and appropriate for DC home with home health on 1L continuous O2. She will be seen for close f/u and may be able to come off oxygen at that time.      She discontinued Tagrisso in April 2018. Restaging scans from 6/4/18 revealed "Stable appearance of a spiculated right upper lobe pulmonary lesion.  Additional irregular focus superior to the lesion in the right lung apex is stable and may represent scar or additional lesion; although this was not " "hypermetabolic on prior PET-CT.  No new pulmonary lesions. 1.3 cm subcarinal lymph node, not hypermetabolic on prior PET-CT. Stable postsurgical changes of a Whipple procedure. Bilateral nonobstructing nephrolithiasis.  Stable sclerotic focus in the T5 vertebral body, possibly a bone island as this was not hypermetabolic on prior PET-CT. Small hiatal hernia. RECIST SUMMARY: Date of prior examination for comparison 01/30/2018 Lesion 1: Right upper lobe 1.3 cm Series 2 image 31 prior measurement 1.3 cm". Bone scan also from 6/4/18 revealed no evidence of mets.     Her PET scan from 7/11/18 revealed "Right suprahilar lesion SUV max 3.76, previously 6.86. Pretracheal lymph node SUV max 2.55, previously 3.79. There is physiologic intracranial, head, and neck activity.  There is muscle activation.  There is vocal cord activation.  There is physiologic liver, spleen, GI and  activity.  There is a hiatal hernia.  Pelvic organs show nothing unusual.  No bone lesions are seen.  There are areas of benign appearing lung scar"  She notes fatigue.  She denies any nausea, vomiting, diarrhea, constipation, abdominal pain, weight loss or loss of appetite, chest pain, shortness of breath, leg swelling, fatigue, pain, headache, dizziness, or mood changes. Her ECOG PS is 2. She is accompanied by her  and son     She has been on Tarceva since 6/5/18. She has now completed SBRT to her right lung lesions.      HPI She comes in to review her MRI brain from 11/26/19 which reveals "Stable postsurgical changes of left frontal lobe mass resection.  No evidence of residual or recurrent malignancy. Right cerebellar encephalomalacia consistent with prior infarct. Mucosal thickening and layering fluid within the paranasal sinuses which may reflect acute sinusitis"  PET scan 11/26/19 reveals "New hypermetabolic pretracheal lymph node concerning for recurrent metastatic lung cancer. Two new small ground-glass lesions in the right upper lobe " "without corresponding increased radiotracer uptake, although likely too small to characterize with PET-CT.  Recommend attention on follow-up. Focal non physiologic uptake in the right hemicolon. Findings are nonspecific but could represent underlying polyp. Further evaluation could be performed with colonoscopy if clinically warranted"  She noted cough and some bronchitis in October 2019 which has since resolved. Notes fatigue.    Interval History:   Afebrile over past 24h  Last +ve blood culture 01/17  Hgb stable.    Oncology Treatment Plan:   [No treatment plan]    Medications:  Continuous Infusions:  Scheduled Meds:   amitriptyline  50 mg Oral QHS    apixaban  5 mg Oral BID    calcitRIOL  0.5 mcg Oral Daily    calcium carbonate  1,000 mg Oral Daily    [START ON 1/22/2020] ciprofloxacin HCl  750 mg Oral Q12H    dronabinol  5 mg Oral BID AC    levETIRAcetam  500 mg Oral BID    lipase-protease-amylase 12,000-38,000-60,000 units  1 capsule Oral TID WM    LORazepam  1 mg Oral QHS    polyethylene glycol  17 g Oral Daily     PRN Meds:Dextrose 10% Bolus, Dextrose 10% Bolus, glucagon (human recombinant), glucose, glucose, ondansetron, prochlorperazine, sodium chloride 0.9%     Review of Systems   Constitutional: Positive for fever. Negative for chills and fatigue.   HENT: Negative for rhinorrhea and trouble swallowing.    Eyes: Negative for photophobia and visual disturbance.   Respiratory: Negative for cough and shortness of breath.    Cardiovascular: Negative for chest pain and leg swelling.   Gastrointestinal: Positive for anal bleeding. Negative for abdominal distention, abdominal pain, diarrhea and nausea.   Genitourinary: Negative for difficulty urinating and dysuria.   Musculoskeletal: Negative for arthralgias and myalgias.   Allergic/Immunologic: Positive for immunocompromised state.   Neurological: Negative for syncope and headaches.   Psychiatric/Behavioral: Negative for behavioral problems and " confusion.     Objective:     Vital Signs (Most Recent):  Temp: 98.5 °F (36.9 °C) (01/21/20 0757)  Pulse: 91 (01/21/20 0757)  Resp: 18 (01/21/20 0757)  BP: (!) 147/73 (01/21/20 0757)  SpO2: 98 % (01/21/20 0757) Vital Signs (24h Range):  Temp:  [98.3 °F (36.8 °C)-99.8 °F (37.7 °C)] 98.5 °F (36.9 °C)  Pulse:  [82-95] 91  Resp:  [15-18] 18  SpO2:  [94 %-98 %] 98 %  BP: (132-147)/(61-73) 147/73     Weight: 63.5 kg (140 lb)  Body mass index is 22.6 kg/m².  Body surface area is 1.72 meters squared.      Intake/Output Summary (Last 24 hours) at 1/21/2020 0909  Last data filed at 1/21/2020 0851  Gross per 24 hour   Intake 490 ml   Output 500 ml   Net -10 ml       Physical Exam   Constitutional: She is oriented to person, place, and time. She appears well-developed. No distress.   HENT:   Head: Normocephalic and atraumatic.   Eyes: EOM are normal.   Neck: Normal range of motion. Neck supple.   Cardiovascular: Normal rate and regular rhythm.   Pulmonary/Chest: Effort normal. No respiratory distress.   Abdominal: Soft. She exhibits no distension. There is no tenderness.   Pt refused rectal exam.    Musculoskeletal: She exhibits edema (1+ BLE up to knees). She exhibits no tenderness.   Neurological: She is alert and oriented to person, place, and time.   Skin: Skin is warm and dry.   Psychiatric: She has a normal mood and affect. Her behavior is normal.   Nursing note and vitals reviewed.      Significant Labs:   CBC:   Recent Labs   Lab 01/20/20  0322 01/21/20 0427   WBC 6.73 5.09   HGB 7.7* 8.4*   HCT 25.6* 28.1*    168    and CMP:   Recent Labs   Lab 01/20/20  0322 01/21/20 0427    140   K 4.0 4.7   * 115*   CO2 22* 18*   GLU 81 84   BUN 16 15   CREATININE 1.7* 1.6*   CALCIUM 6.6* 7.1*   PROT 4.4* 5.2*   ALBUMIN 1.9* 2.1*   BILITOT 1.0 0.8   ALKPHOS 178* 226*   AST 70* 62*   * 92*   ANIONGAP 6* 7*   EGFRNONAA 29.1* 31.3*       Diagnostic Results:  I have reviewed and interpreted all pertinent  imaging results/findings within the past 24 hours.    Assessment/Plan:     * Bacteremia due to Klebsiella pneumoniae  Patient is a 74 year old woman with metastatic lung adenocarcinoma who presents with fevers. She was febrile up to 104.3F in the ED and tachycardic upon admission. Otherwise, other vitals were stable. She had a normal WBC.     Blood cultures growing gram negative rods. Unclear source as UA is negative, CXR non-revealing and CT chest-abd-pelvis is unremarkable. Possibly biliary? Pt does have a history of whipple's procedure. LFTs and T bilis elevated on admission. Pt is asymptomatic other than the fever which is now subsiding. WBC trending down.     Plan:  - Patient received 30mg/kg IVF in ED. First lactic acid WNL but second one elevated to 3.5. Repeat lactic acid is now down to normal.  - CXR showed no acute process, UA not suggestive of UTI. Flu was negative.  - Blood cultures growing Klebsiella pneumonia. Pansensitive.  - Switch zosyn to Ciprofloxacin 400 mg IV BID. Cultures from 1/19 negative for >48 hours.   - Plan to discharge on Ciprofloxacin 750mg BID for 14days    Hyperchloremic metabolic acidosis  Pt noted to have worsening HCO3 with hyperchloremia. AG of 10.  Likely RTA in the setting of CKD +/- medication induced    - Urine elctrolytes- Unreliable UAG in the setting of CKD  - received 1 L of Bicarb on 1/18. HCO3 improved to 22    - Pt reports being on sodium bicarb tabs at home but they were discontinued last year.   - Monitor HCO3 and if still trending down will resume sodium bicarb tabs.     Elevated LFTs  Patient's LFTs on admission were elevated (AST 1300,  from prior a month ago AST 39, ). She reports no new medication additions or changes.    Plan:  - Abdominal US: Mild intrahepatic biliary ductal dilatation presumably secondary to patient's Whipple procedure.  - Acute hepatitis panel is negative. Unclear etiology but possible sepsis causing elevation in LFTs.  - Hold  home atorvastatin for now  - Trend daily CMP, improving now.     History of deep vein thrombosis (DVT) of lower extremity  Continue home Eliquis 5mg BID    Insomnia  Continue home amitriptyline 50mg QHS    Dyslipidemia  Holding home atorvastatin in the setting of elevated LFTs. Resume when appropriate.    Debility  PT/OT consulted, appreciate assistance    CKD (chronic kidney disease) stage 3, GFR 30-59 ml/min  It appears from past year, Cr mostly ranged from 1.7-1.9. On admission, Cr 1.5.    - Monitor daily CMP. Cr stable today  - Avoid nephrotoxic agents.     Hypomagnesemia    Replete electrolytes PRN    Hypocalcemia  Pt reports being on calcium tabs at home, possibly secondary to CKD.   Ca today 6.7--> Corrected 8.3  - Calcium gluconate 1 gr IV today..  - Low vit D levels.  - Start Calcitriol and TUMS 1000 mg daily.    Malignant neoplasm of lower lobe of right lung  Patient is a 74 year old woman with metastatic lung adenocarcinoma on Xgeva and Tarceva who follows with Dr. Vazquez in clinic.  - Per pharmacy, only staff can order Tarceva  - Will need outpatient follow up with Dr. Vazquez upon discharge    Meningioma  Patient is s/p resection in 2016  Has been on keppra as seizure prophylaxis since - continue home keppra 500mg BID    Essential hypertension  Hold home losartan and metoprolol in the setting of sepsis. Resume when appropriate.    Solange Nicholas MD  Hematology/Oncology  [facility]

## 2020-01-21 NOTE — PLAN OF CARE
[facility]    HOME HEALTH ORDERS  FACE TO FACE ENCOUNTER    Patient Name: Naty St  YOB: 1945    PCP: Olvin Mars MD   PCP Address: [address] [facility] [place] [address]  PCP Phone Number: [phone]  PCP Fax: [phone]    Encounter Date: 01/21/2020    Admit to Home Health    Diagnoses:  Active Hospital Problems    Diagnosis  POA    *Bacteremia due to Klebsiella pneumoniae [R78.81]  Yes    Hyperchloremic metabolic acidosis [E87.2]  Unknown    Febrile illness, acute [R50.9]  Yes    Elevated LFTs [R94.5]  Yes    History of deep vein thrombosis (DVT) of lower extremity [Z86.718]  Not Applicable    Insomnia [G47.00]  Yes    Dyslipidemia [E78.5]  Yes    Debility [R53.81]  Yes    CKD (chronic kidney disease) stage 3, GFR 30-59 ml/min [N18.3]  Yes    Hypomagnesemia [E83.42]  Yes    Hypocalcemia [E83.51]  Yes    Malignant neoplasm of lower lobe of right lung [C34.31]  Yes    Meningioma [D32.9]  Yes     Chronic    Essential hypertension [I10]  Yes      Resolved Hospital Problems   No resolved problems to display.       Future Appointments   Date Time Provider Department Center   1/24/2020  9:30 AM [facility] PET CT LIMIT 500 LBS [facility] PET CT [facility]   1/30/2020  8:00 AM Willie Zelaya DNP [facility] HERAHR [place]   1/30/2020  9:40 AM LAB, HEMONC CANCER BLDG [facility] LAB HO [place]   1/30/2020 10:45 AM Karrie Vazquez MD [facility] HEM ONC [place]   1/30/2020 11:30 AM INJECTION, [facility] INFUSION [facility] CHEMO [place]   2/3/2020  1:00 PM Genesis Souza NP On license of [facility] PCW           I have seen and examined this patient face to face today. My clinical findings that support the need for the home health skilled services and home bound status are the following:  Weakness/numbness causing balance and gait disturbance due to Infection, Weakness/Debility and Malignancy/Cancer making it taxing to leave home.  Requiring assistive device to leave home  due to unsteady gait caused by  Infection, Weakness/Debility and Malignancy/Cancer.    Allergies:  Review of patient's allergies indicates:   Allergen Reactions    Tobradex [tobramycin-dexamethasone] Swelling    Iodinated contrast media Hives    Latex Rash and Hives    Phenytoin sodium extended Other (See Comments) and Rash       Diet: regular diet    Activities: activity as tolerated    Nursing:   SN to complete comprehensive assessment including routine vital signs. Instruct on disease process and s/s of complications to report to MD. Review/verify medication list sent home with the patient at time of discharge  and instruct patient/caregiver as needed. Frequency may be adjusted depending on start of care date.    Notify MD if SBP > 160 or < 90; DBP > 90 or < 50; HR > 120 or < 50; Temp > 101; Other:         CONSULTS:    Physical Therapy to evaluate and treat. Evaluate for home safety and equipment needs; Establish/upgrade home exercise program. Perform / instruct on therapeutic exercises, gait training, transfer training, and Range of Motion.  Occupational Therapy to evaluate and treat. Evaluate home environment for safety and equipment needs. Perform/Instruct on transfers, ADL training, ROM, and therapeutic exercises.   to evaluate for community resources/long-range planning.  Aide to provide assistance with personal care, ADLs, and vital signs.    MISCELLANEOUS CARE:  N/A    WOUND CARE ORDERS  n/a      Medications: Review discharge medications with patient and family and provide education.      Current Discharge Medication List      CONTINUE these medications which have NOT CHANGED    Details   albuterol-ipratropium (DUO-NEB) 2.5 mg-0.5 mg/3 mL nebulizer solution Take 3 mLs by nebulization every 6 (six) hours as needed for Wheezing or Shortness of Breath. Rescue  Qty: 1 Box, Refills: 3      amitriptyline (ELAVIL) 50 MG tablet Take 1 tablet (50 mg total) by mouth every evening.  Qty: 90 tablet,  Refills: 12    Associated Diagnoses: Depression, unspecified depression type      atorvastatin (LIPITOR) 40 MG tablet Take 1 tablet (40 mg total) by mouth once daily.  Qty: 90 tablet, Refills: 12      clindamycin phosphate 1% (CLINDAGEL) 1 % gel APPLY TOPICALLY TWICE DAILY  Qty: 60 g, Refills: 6    Associated Diagnoses: Acneiform drug eruption      CREON CpDR TAKE ONE CAPSULE BY MOUTH THREE TIMES A DAY WITH MEALS  Qty: 270 capsule, Refills: 3      denosumab (XGEVA) 120 mg/1.7 mL (70 mg/mL) Soln Inject 120 mg into the skin every 28 days.      doxycycline (VIBRA-TABS) 100 MG tablet Take 1 tablet (100 mg total) by mouth every 12 (twelve) hours.  Qty: 60 tablet, Refills: 3    Associated Diagnoses: Acneiform rash      doxycycline (VIBRAMYCIN) 100 MG Cap Take 1 capsule (100 mg total) by mouth every 12 (twelve) hours.  Qty: 14 capsule, Refills: 1    Comments: Take with food      dronabinol (MARINOL) 5 MG capsule Take 1 capsule (5 mg total) by mouth 2 (two) times daily before meals.  Qty: 60 capsule, Refills: 3    Associated Diagnoses: Malignant neoplasm of lower lobe of right lung; Anorexia      ELIQUIS 5 mg Tab TAKE ONE TABLET BY MOUTH TWICE DAILY  Qty: 60 tablet, Refills: 6    Comments: This prescription was filled on 11/14/2019. Any refills authorized will be placed on file.  Associated Diagnoses: Other chronic pulmonary embolism without acute cor pulmonale      erlotinib (TARCEVA) 150 MG tablet Take 1 tablet (150 mg total) by mouth once daily.  Qty: 30 tablet, Refills: 11    Associated Diagnoses: Malignant neoplasm of lower lobe of right lung      levETIRAcetam (KEPPRA) 500 MG Tab TAKE ONE TABLET BY MOUTH TWICE DAILY  Qty: 180 tablet, Refills: 3    Associated Diagnoses: Brain metastases      LORazepam (ATIVAN) 1 MG tablet Take 1 tablet (1 mg total) by mouth 2 (two) times daily.  Qty: 60 tablet, Refills: 5      losartan (COZAAR) 50 MG tablet TAKE ONE TABLET BY MOUTH TWICE DAILY  Qty: 60 tablet, Refills: 10    Associated  Diagnoses: Dilated cardiomyopathy; Essential hypertension      metoprolol succinate (TOPROL-XL) 50 MG 24 hr tablet TAKE ONE TABLET BY MOUTH ONCE DAILY. hold UNTIL your appointment WITH your pcp. heart rate and blood pressure has been normal off OF this me  Qty: 30 tablet, Refills: 11      multivitamin (THERAGRAN) per tablet Take 1 tablet by mouth once daily.      MYRBETRIQ 50 mg Tb24 TAKE ONE TABLET BY MOUTH ONCE DAILY  Qty: 30 tablet, Refills: 11    Associated Diagnoses: OAB (overactive bladder)      polyethylene glycol (GLYCOLAX) 17 gram/dose powder       senna-docusate 8.6-50 mg (SENNA LAXATIVE-STOOL SOFTENER) 8.6-50 mg per tablet Take 1 tablet by mouth once daily.             I certify that this patient is confined to her home and needs intermittent skilled nursing care, physical therapy and speech therapy.

## 2020-01-22 ENCOUNTER — TELEPHONE (OUTPATIENT)
Dept: HEMATOLOGY/ONCOLOGY | Facility: CLINIC | Age: 75
End: 2020-01-22

## 2020-01-22 LAB
BACTERIA BLD CULT: ABNORMAL

## 2020-01-22 NOTE — TELEPHONE ENCOUNTER
----- Message from Zaira Moise sent at 1/22/2020  2:02 PM CST -----  Contact: [facility] [place] - [phone]      ----- Message -----  From: Fallon Rivas  Sent: 1/22/2020   1:57 PM CST  To: Reinaldo RAMIREZ Staff    Needs to speak with you on her home health .Please advise       Patient will start on Monday 27 th

## 2020-01-22 NOTE — TELEPHONE ENCOUNTER
Spoke with dragan with HH. She is calling to state the patient deferred HH to start until next Monday.

## 2020-01-24 ENCOUNTER — HOSPITAL ENCOUNTER (OUTPATIENT)
Dept: RADIOLOGY | Facility: HOSPITAL | Age: 75
Discharge: HOME | End: 2020-01-24
Attending: INTERNAL MEDICINE
Payer: MEDICARE

## 2020-01-24 DIAGNOSIS — C34.31 MALIGNANT NEOPLASM OF LOWER LOBE OF RIGHT LUNG: ICD-10-CM

## 2020-01-24 LAB
BACTERIA BLD CULT: NORMAL
BACTERIA BLD CULT: NORMAL
POCT GLUCOSE: 73 MG/DL (ref 70–110)

## 2020-01-24 PROCEDURE — 78815 PET IMAGE W/CT SKULL-THIGH: CPT | Mod: 26,HCNC,PS, | Performed by: RADIOLOGY

## 2020-01-24 PROCEDURE — 78815 NM PET CT ROUTINE: ICD-10-PCS | Mod: 26,HCNC,PS, | Performed by: RADIOLOGY

## 2020-01-24 PROCEDURE — 78815 PET IMAGE W/CT SKULL-THIGH: CPT | Mod: TC,HCNC

## 2020-01-24 PROCEDURE — A9552 F18 FDG: HCPCS | Mod: HCNC

## 2020-01-25 LAB — BACTERIA BLD CULT: NORMAL

## 2020-01-26 LAB — BACTERIA BLD CULT: NORMAL

## 2020-01-27 PROCEDURE — G0180 PR HOME HEALTH MD CERTIFICATION: ICD-10-PCS | Mod: ,,, | Performed by: INTERNAL MEDICINE

## 2020-01-27 PROCEDURE — G0180 MD CERTIFICATION HHA PATIENT: HCPCS | Mod: ,,, | Performed by: INTERNAL MEDICINE

## 2020-01-30 ENCOUNTER — TELEPHONE (OUTPATIENT)
Dept: HEMATOLOGY/ONCOLOGY | Facility: CLINIC | Age: 75
End: 2020-01-30

## 2020-01-30 NOTE — TELEPHONE ENCOUNTER
Returned call to PT. She wish to have all appointments on the same day after 8 AM. Rescheduled office visit, labs and injection. PT does not wish to reschedule genetic appointment at this time.

## 2020-01-30 NOTE — TELEPHONE ENCOUNTER
Called number given and spoke with her son. He asked me to call her number to reschedule missed appointments.     Called her number and left a message for her to call back and reschedule missed appointments.   yes

## 2020-01-30 NOTE — TELEPHONE ENCOUNTER
"----- Message from Dorinda Perean sent at 1/30/2020 10:54 AM CST -----  Contact: Naty St   Reschedule Existing Appointment    Appt Date: 1/30  Type of appt :  F/u , lab, and injection   Physician: George VALENTINO MD   Reason for rescheduling?     - pt got the days wrong, she thought they were for tomorrow.   She would like for the lab, f/u, meeting with Willie Zelaya,   and the injection to all be arranged same day.   Caller:  Naty St   Contact Preference: [phone]    Additional Information:  "Thank you for all that you do for our patients'"  "

## 2020-01-30 NOTE — TELEPHONE ENCOUNTER
----- Message from Alina Sigala sent at 1/30/2020 10:46 AM CST -----  Contact: Basil (son)  Reschedule existing appt      Appt date rescheduling:: 01/30    Is appt today or next day appt:: yes    Type of appt :: lab and f/u    Provider:: Dr Vazquez    Contact:: [phone]    Reason for rescheduling::    Addition info::

## 2020-02-06 ENCOUNTER — OFFICE VISIT (OUTPATIENT)
Dept: HEMATOLOGY/ONCOLOGY | Facility: CLINIC | Age: 75
End: 2020-02-06
Payer: MEDICARE

## 2020-02-06 ENCOUNTER — LAB VISIT (OUTPATIENT)
Dept: LAB | Facility: HOSPITAL | Age: 75
End: 2020-02-06
Attending: INTERNAL MEDICINE
Payer: MEDICARE

## 2020-02-06 ENCOUNTER — TELEPHONE (OUTPATIENT)
Dept: HEMATOLOGY/ONCOLOGY | Facility: CLINIC | Age: 75
End: 2020-02-06

## 2020-02-06 ENCOUNTER — INFUSION (OUTPATIENT)
Dept: INFUSION THERAPY | Facility: HOSPITAL | Age: 75
End: 2020-02-06
Attending: INTERNAL MEDICINE
Payer: MEDICARE

## 2020-02-06 ENCOUNTER — PATIENT OUTREACH (OUTPATIENT)
Dept: ADMINISTRATIVE | Facility: OTHER | Age: 75
End: 2020-02-06

## 2020-02-06 VITALS
OXYGEN SATURATION: 98 % | HEIGHT: 66 IN | DIASTOLIC BLOOD PRESSURE: 67 MMHG | TEMPERATURE: 99 F | BODY MASS INDEX: 23.74 KG/M2 | RESPIRATION RATE: 16 BRPM | SYSTOLIC BLOOD PRESSURE: 154 MMHG | HEART RATE: 69 BPM | WEIGHT: 147.69 LBS

## 2020-02-06 DIAGNOSIS — C34.31 MALIGNANT NEOPLASM OF LOWER LOBE OF RIGHT LUNG: Primary | ICD-10-CM

## 2020-02-06 DIAGNOSIS — C79.51 SECONDARY CANCER OF BONE: ICD-10-CM

## 2020-02-06 DIAGNOSIS — C34.31 MALIGNANT NEOPLASM OF LOWER LOBE OF RIGHT LUNG: ICD-10-CM

## 2020-02-06 DIAGNOSIS — C77.1 SECONDARY MALIGNANT NEOPLASM OF INTRATHORACIC LYMPH NODES: ICD-10-CM

## 2020-02-06 DIAGNOSIS — C79.31 BRAIN METASTASES: ICD-10-CM

## 2020-02-06 DIAGNOSIS — N18.30 CKD (CHRONIC KIDNEY DISEASE) STAGE 3, GFR 30-59 ML/MIN: ICD-10-CM

## 2020-02-06 LAB
ALBUMIN SERPL BCP-MCNC: 2.6 G/DL (ref 3.5–5.2)
ALP SERPL-CCNC: 257 U/L (ref 55–135)
ALT SERPL W/O P-5'-P-CCNC: 69 U/L (ref 10–44)
ANION GAP SERPL CALC-SCNC: 6 MMOL/L (ref 8–16)
AST SERPL-CCNC: 79 U/L (ref 10–40)
BILIRUB SERPL-MCNC: 0.6 MG/DL (ref 0.1–1)
BUN SERPL-MCNC: 23 MG/DL (ref 8–23)
CALCIUM SERPL-MCNC: 8.3 MG/DL (ref 8.7–10.5)
CHLORIDE SERPL-SCNC: 115 MMOL/L (ref 95–110)
CO2 SERPL-SCNC: 23 MMOL/L (ref 23–29)
CREAT SERPL-MCNC: 1.7 MG/DL (ref 0.5–1.4)
ERYTHROCYTE [DISTWIDTH] IN BLOOD BY AUTOMATED COUNT: 15.7 % (ref 11.5–14.5)
EST. GFR  (AFRICAN AMERICAN): 33.5 ML/MIN/1.73 M^2
EST. GFR  (NON AFRICAN AMERICAN): 29.1 ML/MIN/1.73 M^2
GLUCOSE SERPL-MCNC: 83 MG/DL (ref 70–110)
HCT VFR BLD AUTO: 30.1 % (ref 37–48.5)
HGB BLD-MCNC: 8.4 G/DL (ref 12–16)
IMM GRANULOCYTES # BLD AUTO: 0.01 K/UL (ref 0–0.04)
MCH RBC QN AUTO: 25.9 PG (ref 27–31)
MCHC RBC AUTO-ENTMCNC: 27.9 G/DL (ref 32–36)
MCV RBC AUTO: 93 FL (ref 82–98)
NEUTROPHILS # BLD AUTO: 1.4 K/UL (ref 1.8–7.7)
PLATELET # BLD AUTO: 339 K/UL (ref 150–350)
PMV BLD AUTO: 11.7 FL (ref 9.2–12.9)
POTASSIUM SERPL-SCNC: 4.8 MMOL/L (ref 3.5–5.1)
PROT SERPL-MCNC: 5.7 G/DL (ref 6–8.4)
RBC # BLD AUTO: 3.24 M/UL (ref 4–5.4)
SODIUM SERPL-SCNC: 144 MMOL/L (ref 136–145)
WBC # BLD AUTO: 3.9 K/UL (ref 3.9–12.7)

## 2020-02-06 PROCEDURE — 1101F PR PT FALLS ASSESS DOC 0-1 FALLS W/OUT INJ PAST YR: ICD-10-PCS | Mod: HCNC,CPTII,S$GLB, | Performed by: INTERNAL MEDICINE

## 2020-02-06 PROCEDURE — 99999 PR PBB SHADOW E&M-EST. PATIENT-LVL V: CPT | Mod: PBBFAC,HCNC,, | Performed by: INTERNAL MEDICINE

## 2020-02-06 PROCEDURE — 99499 UNLISTED E&M SERVICE: CPT | Mod: HCNC,S$GLB,, | Performed by: INTERNAL MEDICINE

## 2020-02-06 PROCEDURE — 99215 PR OFFICE/OUTPT VISIT, EST, LEVL V, 40-54 MIN: ICD-10-PCS | Mod: HCNC,S$GLB,, | Performed by: INTERNAL MEDICINE

## 2020-02-06 PROCEDURE — 63600175 PHARM REV CODE 636 W HCPCS: Mod: JG,HCNC | Performed by: INTERNAL MEDICINE

## 2020-02-06 PROCEDURE — 1101F PT FALLS ASSESS-DOCD LE1/YR: CPT | Mod: HCNC,CPTII,S$GLB, | Performed by: INTERNAL MEDICINE

## 2020-02-06 PROCEDURE — 3077F SYST BP >= 140 MM HG: CPT | Mod: HCNC,CPTII,S$GLB, | Performed by: INTERNAL MEDICINE

## 2020-02-06 PROCEDURE — 1126F AMNT PAIN NOTED NONE PRSNT: CPT | Mod: HCNC,S$GLB,, | Performed by: INTERNAL MEDICINE

## 2020-02-06 PROCEDURE — 1159F MED LIST DOCD IN RCRD: CPT | Mod: HCNC,S$GLB,, | Performed by: INTERNAL MEDICINE

## 2020-02-06 PROCEDURE — 3078F DIAST BP <80 MM HG: CPT | Mod: HCNC,CPTII,S$GLB, | Performed by: INTERNAL MEDICINE

## 2020-02-06 PROCEDURE — 3078F PR MOST RECENT DIASTOLIC BLOOD PRESSURE < 80 MM HG: ICD-10-PCS | Mod: HCNC,CPTII,S$GLB, | Performed by: INTERNAL MEDICINE

## 2020-02-06 PROCEDURE — 3077F PR MOST RECENT SYSTOLIC BLOOD PRESSURE >= 140 MM HG: ICD-10-PCS | Mod: HCNC,CPTII,S$GLB, | Performed by: INTERNAL MEDICINE

## 2020-02-06 PROCEDURE — 36415 COLL VENOUS BLD VENIPUNCTURE: CPT | Mod: HCNC

## 2020-02-06 PROCEDURE — 99999 PR PBB SHADOW E&M-EST. PATIENT-LVL V: ICD-10-PCS | Mod: PBBFAC,HCNC,, | Performed by: INTERNAL MEDICINE

## 2020-02-06 PROCEDURE — 1126F PR PAIN SEVERITY QUANTIFIED, NO PAIN PRESENT: ICD-10-PCS | Mod: HCNC,S$GLB,, | Performed by: INTERNAL MEDICINE

## 2020-02-06 PROCEDURE — 99215 OFFICE O/P EST HI 40 MIN: CPT | Mod: HCNC,S$GLB,, | Performed by: INTERNAL MEDICINE

## 2020-02-06 PROCEDURE — 85027 COMPLETE CBC AUTOMATED: CPT | Mod: HCNC

## 2020-02-06 PROCEDURE — 1159F PR MEDICATION LIST DOCUMENTED IN MEDICAL RECORD: ICD-10-PCS | Mod: HCNC,S$GLB,, | Performed by: INTERNAL MEDICINE

## 2020-02-06 PROCEDURE — 99499 RISK ADDL DX/OHS AUDIT: ICD-10-PCS | Mod: HCNC,S$GLB,, | Performed by: INTERNAL MEDICINE

## 2020-02-06 PROCEDURE — 96372 THER/PROPH/DIAG INJ SC/IM: CPT | Mod: HCNC

## 2020-02-06 PROCEDURE — 80053 COMPREHEN METABOLIC PANEL: CPT | Mod: HCNC

## 2020-02-06 RX ADMIN — DENOSUMAB 120 MG: 120 INJECTION SUBCUTANEOUS at 10:02

## 2020-02-06 NOTE — Clinical Note
Schedule CBC,CMP and see Karena and Xgeva in 6 weeks. Also schedule CBC,CMP, PET scan and see me and for Xgeva in 12 weeks

## 2020-02-06 NOTE — PROGRESS NOTES
"Subjective:       Patient ID: Naty St is a 74 y.o. female.    Chief Complaint: Malignant neoplasm of lower lobe of right lung  Oncologic History:  Ms. Naty St was admitted to the hospital between 01/25/2016 and 01/28/2016. She has a history of pancreatic cancer 17 years ago, breast cancer and meningioma, presented to the hospital complaining of loss of consciousness. The patient apparently was in her normal state of health and she stood up to go to the bathroom and fell to the floor. The patient's daughter helped the mother up in the bathroom and noted that her mother's upper extremities were shaking and eye rolling. No reports of bowel or bladder incontinence, tongue biting or rolling. The second episode lasted about four minutes and she was extremely lethargic following that. Apparently, the patient had a meningioma resection done in the past; however, recently, underwent neurosurgical resection with Dr. 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    Additional immunohistochemical stains were performed which revealed the tumor cells to be are positive for TTF1 and negative for ER and GCDFP. The morphology and TTF1 positivity are most consistent with lung primary.    Of note, imaging scan at the end of January 2016 revealed a mass in the lung at 2 cm in the medial aspect of the apical segment of the right upper lobe abutting the mediastinum at the level of the azygous vein and abutting and possibly encasing the segmental bronchi and vessels of the apical segment of the right upper lobe and no pleural fluid was present. Also, there is an enlarged right paratracheal lymph node. No evidence of any metastatic disease at the pancreatic site with postoperative changes post Whipple disease  Her PET Scan from 2/15/16 reveal "Hypermetabolic mass in the right lung apex consistent with a " "primary malignancy. Hypermetabolic mediastinal lymph nodes consistent with metastatic disease. Right sacral hypermetabolic lesion consistent with metastatic disease, noting additional mildly sclerotic lesions in multiple vertebral bodies which do not demonstrate abnormal hypermetabolism  She underwent IR bone biopsy which revealed metastatic adenocarcinoma of lung origin. 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  She has completed focal RT to brain lesions in March 2016.    PET scan from 6/6/16 shows "Dramatic almost complete response to therapy."  Lovenox started after US lower ext 6/7/16 shows Acute complete occlusion of one of the left posterior tibial vein.Remote partial thrombus of the proximal left superficial femoral vein.  She is on Tarceva.   12/6/16 PET scan reveals "Stable right upper lobe lesion and right hilar lymph node. No new lesions identified. Trace left pleural effusion".  1/27/17 Renal u/s "Medical renal disease. Nonobstructive right nephrolithiasis"  1/31/17 PET - "Right upper lobe nodule and right hilar lymph node similar and very low grade activity.  There is no definite evidence of recurrence."   11/9/17 PET "In this patient with history of lung cancer, there is interval increase in size and hypermetabolism of a right upper lobe lung lesion concerning for recurrent disease. Additionally, there is interval appearance of a hypermetabolic paratracheal lymph node suspicious for metastatic disease"     She progressed on Tarceva She underwent EBUS on 12/5/17. Pathology revealed adenocarcinoma but T790m could not be done as quantity was not insufficient. Her PET scan from 1/30/18 revealed The patient's previously identified abnormal lesions is slightly greater uptake of FDG on today's study compared with prior exam. These findings are concerning for progression of disease"      She was hospitalized between 4/9/18-4/16/18. Her hospital course was as follows "[facility] "[facility] with acute hypoxic respiratory failure. She was requiring 4 L of nc on admission. She was started on moxi for CAP. She received one dose of ceftriaxone in the ED. On 2nd day of admission she spiked a fever. Her Hb was ~7 g/dl so she was transfused 1 unit of PRBCs. Over night she developed worsening of her symptoms with increased O2 requirements to 15 L HFNC. Her CXR showed worsening congestion. There was a concern of TRALI vs TACO. New 2D echo with diastolic dysfunction. She was given IV lasix pushes as needed and was started on dexamethasone.  Her abx was escalated to vanc and cefepime. She improved with diuresis and steroids. Pulmonary were consulted. Her O2 requirements continued to improve. On 4/15 she was switched to Augmentin. PT recommended discharging her to SNF but the patient refused and she wanted to go home with home health. She qualified for home oxygen so she was discharged on 3 L nc while at rest and 6 while active. Augmentin and dexamethasone were discontinued on discharge"     She was readmitted from 4/27 to 5/3/18 with who presented to the ED with a complaint of fatigue and worsening DELA CRUZ. Pt diagnosed PNA 4/9 and was discharged on 4/16 on 3L oxygen. She was initially placed on cefepime for 3 days followed by an add'l 2 days of Augmentin. Pulm was consulted with assistance as her oxygen requirements were increasing. She was placed on oral steroids, then trailed on 80mg TID solumedrol for 2 days and will be discharged on a prednisone taper. She was also diuresed with 2 days of 40mg IV lasix with appropriate UOP resulting, improvement on repeat CXR. She was medically stable and appropriate for DC home with home health on 1L continuous O2. She will be seen for close f/u and may be able to come off oxygen at that time.      She discontinued Tagrisso in April 2018. Restaging scans from 6/4/18 revealed "Stable appearance of a spiculated right upper lobe pulmonary lesion.  Additional irregular focus " "superior to the lesion in the right lung apex is stable and may represent scar or additional lesion; although this was not hypermetabolic on prior PET-CT.  No new pulmonary lesions. 1.3 cm subcarinal lymph node, not hypermetabolic on prior PET-CT. Stable postsurgical changes of a Whipple procedure. Bilateral nonobstructing nephrolithiasis.  Stable sclerotic focus in the T5 vertebral body, possibly a bone island as this was not hypermetabolic on prior PET-CT. Small hiatal hernia. RECIST SUMMARY: Date of prior examination for comparison 01/30/2018 Lesion 1: Right upper lobe 1.3 cm Series 2 image 31 prior measurement 1.3 cm"  Bone scan also from 6/4/18 revealed no evidence of mets.     Her PET scan from 7/11/18 revealed "Right suprahilar lesion SUV max 3.76, previously 6.86. Pretracheal lymph node SUV max 2.55, previously 3.79. There is physiologic intracranial, head, and neck activity.  There is muscle activation.  There is vocal cord activation.  There is physiologic liver, spleen, GI and  activity.  There is a hiatal hernia.  Pelvic organs show nothing unusual.  No bone lesions are seen.  There are areas of benign appearing lung scar"  She notes fatigue.  She denies any nausea, vomiting, diarrhea, constipation, abdominal pain, weight loss or loss of appetite, chest pain, shortness of breath, leg swelling, fatigue, pain, headache, dizziness, or mood changes. Her ECOG PS is 2. She is accompanied by her  and son     She has been on Tarceva since 6/5/18. She has now completed SBRT to her right lung lesions.      \[facility] [place] [facility]\""daly comes in to review her PET scan from 1/24/2020 which reveals "0.8 cm pretracheal lymph node (series 3, image 48) (previously 0.9 cm) with SUV max 5.89 (previously 5.72). Ill-defined lesion with focal uptake in the right hilum (image 59) with SUV max 3.09.  Stable non-hypermetabolic paramediastinal consolidation and volume loss at the right lung apex, consistent with post radiation change. " "Previously seen small ground-glass lesions in the right upper lobe and abutting the major fissure are not definitively seen on this study.There is a 1.8 cm sclerotic focus in the right iliac bone (series 3, image 159) that is not hypermetabolic"  She denies any new issues.  She was hospitalized with Kelbsiella in her blood and discharged on 1/21/2020 on Cipro X 12 days which she has now completed./      Review of Systems   Constitutional: Positive for fatigue. Negative for appetite change and unexpected weight change.   HENT: Negative for mouth sores.    Eyes: Negative for visual disturbance.   Respiratory: Negative for cough and shortness of breath.    Cardiovascular: Negative for chest pain.   Gastrointestinal: Negative for abdominal pain and diarrhea.   Genitourinary: Negative for frequency.   Musculoskeletal: Negative for back pain.   Skin: Negative for rash.   Neurological: Negative for headaches.   Hematological: Negative for adenopathy.   Psychiatric/Behavioral: The patient is not nervous/anxious.    All other systems reviewed and are negative.      Objective:      Physical Exam   Constitutional: She is oriented to person, place, and time. She appears well-developed and well-nourished.   HENT:   Mouth/Throat: No oropharyngeal exudate.   Cardiovascular: Normal rate and normal heart sounds.   Pulmonary/Chest: Effort normal and breath sounds normal. She has no wheezes.   Abdominal: Soft. Bowel sounds are normal. There is no tenderness.   Musculoskeletal: She exhibits no edema or tenderness.   Lymphadenopathy:     She has no cervical adenopathy.   Neurological: She is alert and oriented to person, place, and time. Coordination normal.   Skin: Skin is warm and dry. No rash noted.   Psychiatric: She has a normal mood and affect. Judgment and thought content normal.   Vitals reviewed.        LABS:  WBC   Date Value Ref Range Status   02/06/2020 3.90 3.90 - 12.70 K/uL Final     Hemoglobin   Date Value Ref Range " Status   02/06/2020 8.4 (L) 12.0 - 16.0 g/dL Final     POC Hematocrit   Date Value Ref Range Status   01/12/2016 25 (L) 36 - 54 %PCV Final     Hematocrit   Date Value Ref Range Status   02/06/2020 30.1 (L) 37.0 - 48.5 % Final     Platelets   Date Value Ref Range Status   02/06/2020 339 150 - 350 K/uL Final     Gran # (ANC)   Date Value Ref Range Status   02/06/2020 1.4 (L) 1.8 - 7.7 K/uL Final     Comment:     The ANC is based on a white cell differential from an   automated cell counter. It has not been microscopically   reviewed for the presence of abnormal cells. Clinical   correlation is required.         Chemistry        Component Value Date/Time     02/06/2020 0849    K 4.8 02/06/2020 0849     (H) 02/06/2020 0849    CO2 23 02/06/2020 0849    BUN 23 02/06/2020 0849    CREATININE 1.7 (H) 02/06/2020 0849    GLU 83 02/06/2020 0849        Component Value Date/Time    CALCIUM 8.3 (L) 02/06/2020 0849    ALKPHOS 257 (H) 02/06/2020 0849    AST 79 (H) 02/06/2020 0849    ALT 69 (H) 02/06/2020 0849    BILITOT 0.6 02/06/2020 0849    ESTGFRAFRICA 33.5 (A) 02/06/2020 0849    EGFRNONAA 29.1 (A) 02/06/2020 0849          Assessment:       1. Malignant neoplasm of lower lobe of right lung    2. Secondary cancer of bone    3. 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 CKD (chronic kidney disease) stage 3, GFR 30-59 ml/min        Plan:        1,2,3 She will continue with Tarceva. She will receive Xgeva today and will return in 6 weeks with labs and for Xgeva. I will see her in 12 weeks with PET scan and next dose of Xgeva  5. Is seeing Nephrology    Above care plan was discussed with patient and accompanying  and all questions were addressed to their satisfaction       Above care plan was discussed with patient and accompanying  and all questions were addressed to their satisfaction

## 2020-02-06 NOTE — TELEPHONE ENCOUNTER
"Spoke with son. Informed him of CTscan results.  He states, "I want to speak with dr sullivan herself, not you. You were not in the room with my mom at the visit, I know. I have multiple questions for dr sullivan from notes I took last year's scans."    Nurse explained to son he needs to be at the visits from now on so he can be in the same conversation as dr sullivan and his mom. He stated he got the dates mixed up, and he couldn't get off work today.    Message routed to dr sullivan    [phone])  "

## 2020-02-07 ENCOUNTER — OFFICE VISIT (OUTPATIENT)
Dept: NEPHROLOGY | Facility: CLINIC | Age: 75
End: 2020-02-07
Payer: MEDICARE

## 2020-02-07 VITALS
BODY MASS INDEX: 23.66 KG/M2 | HEIGHT: 66 IN | WEIGHT: 147.25 LBS | DIASTOLIC BLOOD PRESSURE: 72 MMHG | HEART RATE: 76 BPM | OXYGEN SATURATION: 97 % | SYSTOLIC BLOOD PRESSURE: 132 MMHG

## 2020-02-07 DIAGNOSIS — N18.30 CKD (CHRONIC KIDNEY DISEASE) STAGE 3, GFR 30-59 ML/MIN: Primary | ICD-10-CM

## 2020-02-07 PROCEDURE — 3078F DIAST BP <80 MM HG: CPT | Mod: HCNC,CPTII,S$GLB, | Performed by: INTERNAL MEDICINE

## 2020-02-07 PROCEDURE — 1159F MED LIST DOCD IN RCRD: CPT | Mod: HCNC,S$GLB,, | Performed by: INTERNAL MEDICINE

## 2020-02-07 PROCEDURE — 1101F PR PT FALLS ASSESS DOC 0-1 FALLS W/OUT INJ PAST YR: ICD-10-PCS | Mod: HCNC,CPTII,S$GLB, | Performed by: INTERNAL MEDICINE

## 2020-02-07 PROCEDURE — 99999 PR PBB SHADOW E&M-EST. PATIENT-LVL IV: CPT | Mod: PBBFAC,HCNC,, | Performed by: INTERNAL MEDICINE

## 2020-02-07 PROCEDURE — 1101F PT FALLS ASSESS-DOCD LE1/YR: CPT | Mod: HCNC,CPTII,S$GLB, | Performed by: INTERNAL MEDICINE

## 2020-02-07 PROCEDURE — 99214 OFFICE O/P EST MOD 30 MIN: CPT | Mod: HCNC,S$GLB,, | Performed by: INTERNAL MEDICINE

## 2020-02-07 PROCEDURE — 99499 RISK ADDL DX/OHS AUDIT: ICD-10-PCS | Mod: HCNC,S$GLB,, | Performed by: INTERNAL MEDICINE

## 2020-02-07 PROCEDURE — 3078F PR MOST RECENT DIASTOLIC BLOOD PRESSURE < 80 MM HG: ICD-10-PCS | Mod: HCNC,CPTII,S$GLB, | Performed by: INTERNAL MEDICINE

## 2020-02-07 PROCEDURE — 3075F PR MOST RECENT SYSTOLIC BLOOD PRESS GE 130-139MM HG: ICD-10-PCS | Mod: HCNC,CPTII,S$GLB, | Performed by: INTERNAL MEDICINE

## 2020-02-07 PROCEDURE — 99214 PR OFFICE/OUTPT VISIT, EST, LEVL IV, 30-39 MIN: ICD-10-PCS | Mod: HCNC,S$GLB,, | Performed by: INTERNAL MEDICINE

## 2020-02-07 PROCEDURE — 3075F SYST BP GE 130 - 139MM HG: CPT | Mod: HCNC,CPTII,S$GLB, | Performed by: INTERNAL MEDICINE

## 2020-02-07 PROCEDURE — 1159F PR MEDICATION LIST DOCUMENTED IN MEDICAL RECORD: ICD-10-PCS | Mod: HCNC,S$GLB,, | Performed by: INTERNAL MEDICINE

## 2020-02-07 PROCEDURE — 99499 UNLISTED E&M SERVICE: CPT | Mod: HCNC,S$GLB,, | Performed by: INTERNAL MEDICINE

## 2020-02-07 PROCEDURE — 99999 PR PBB SHADOW E&M-EST. PATIENT-LVL IV: ICD-10-PCS | Mod: PBBFAC,HCNC,, | Performed by: INTERNAL MEDICINE

## 2020-02-07 RX ORDER — FUROSEMIDE 40 MG/1
40 TABLET ORAL DAILY
Qty: 30 TABLET | Refills: 1 | Status: SHIPPED | OUTPATIENT
Start: 2020-02-07 | End: 2020-02-07

## 2020-02-07 RX ORDER — FUROSEMIDE 40 MG/1
40 TABLET ORAL DAILY PRN
Qty: 30 TABLET | Refills: 1 | Status: SHIPPED | OUTPATIENT
Start: 2020-02-07 | End: 2020-05-26 | Stop reason: SDUPTHER

## 2020-02-07 NOTE — TELEPHONE ENCOUNTER
Called shirley  Reviewed her PET scan which are stable.   Advised him to come to her appointments so we can discuss at the same time

## 2020-02-10 ENCOUNTER — EXTERNAL HOME HEALTH (OUTPATIENT)
Dept: HOME HEALTH SERVICES | Facility: HOSPITAL | Age: 75
End: 2020-02-10
Payer: MEDICARE

## 2020-02-13 ENCOUNTER — TELEPHONE (OUTPATIENT)
Dept: NEPHROLOGY | Facility: CLINIC | Age: 75
End: 2020-02-13

## 2020-02-13 NOTE — TELEPHONE ENCOUNTER
Pt was told by Dr. Packer to take 3 tabs of the lasix 40 mg every Fri, Sat and Sun(PRN)  stated that she was urinating about the same as she was before starting the lasix but, today she stating that the swelling has gone down from 2 days ago

## 2020-02-17 ENCOUNTER — HOSPITAL ENCOUNTER (INPATIENT)
Facility: HOSPITAL | Age: 75
LOS: 2 days | Discharge: HOME | DRG: 871 | End: 2020-02-19
Attending: EMERGENCY MEDICINE | Admitting: INTERNAL MEDICINE
Payer: MEDICARE

## 2020-02-17 ENCOUNTER — TELEPHONE (OUTPATIENT)
Dept: NEPHROLOGY | Facility: CLINIC | Age: 75
End: 2020-02-17

## 2020-02-17 DIAGNOSIS — R65.20 SEVERE SEPSIS: ICD-10-CM

## 2020-02-17 DIAGNOSIS — C34.90 ADENOCARCINOMA OF LUNG, UNSPECIFIED LATERALITY: Primary | ICD-10-CM

## 2020-02-17 DIAGNOSIS — A41.9 SEVERE SEPSIS: ICD-10-CM

## 2020-02-17 DIAGNOSIS — R50.9 FEBRILE ILLNESS, ACUTE: ICD-10-CM

## 2020-02-17 DIAGNOSIS — R00.0 TACHYCARDIA: ICD-10-CM

## 2020-02-17 DIAGNOSIS — A41.9 SEPSIS: ICD-10-CM

## 2020-02-17 DIAGNOSIS — Z85.3 HISTORY OF BREAST CANCER: ICD-10-CM

## 2020-02-17 PROBLEM — Z51.5 PALLIATIVE CARE ENCOUNTER: Status: ACTIVE | Noted: 2020-02-17

## 2020-02-17 LAB
ADENOVIRUS: NOT DETECTED
ALBUMIN SERPL BCP-MCNC: 2.8 G/DL (ref 3.5–5.2)
ALP SERPL-CCNC: 720 U/L (ref 55–135)
ALT SERPL W/O P-5'-P-CCNC: 383 U/L (ref 10–44)
AMYLASE SERPL-CCNC: 81 U/L (ref 20–110)
ANION GAP SERPL CALC-SCNC: 11 MMOL/L (ref 8–16)
APAP SERPL-MCNC: 19 UG/ML (ref 10–20)
AST SERPL-CCNC: 845 U/L (ref 10–40)
BASOPHILS # BLD AUTO: 0.03 K/UL (ref 0–0.2)
BASOPHILS NFR BLD: 0.3 % (ref 0–1.9)
BILIRUB DIRECT SERPL-MCNC: 1.5 MG/DL (ref 0.1–0.3)
BILIRUB SERPL-MCNC: 2.7 MG/DL (ref 0.1–1)
BILIRUB UR QL STRIP: NEGATIVE
BORDETELLA PARAPERTUSSIS (IS1001): NOT DETECTED
BORDETELLA PERTUSSIS (PTXP): NOT DETECTED
BUN SERPL-MCNC: 29 MG/DL (ref 8–23)
CALCIUM SERPL-MCNC: 8.4 MG/DL (ref 8.7–10.5)
CHLAMYDIA PNEUMONIAE: NOT DETECTED
CHLORIDE SERPL-SCNC: 110 MMOL/L (ref 95–110)
CK SERPL-CCNC: 70 U/L (ref 20–180)
CLARITY UR REFRACT.AUTO: CLEAR
CO2 SERPL-SCNC: 17 MMOL/L (ref 23–29)
COLOR UR AUTO: YELLOW
CORONAVIRUS 229E, COMMON COLD VIRUS: NOT DETECTED
CORONAVIRUS HKU1, COMMON COLD VIRUS: NOT DETECTED
CORONAVIRUS NL63, COMMON COLD VIRUS: NOT DETECTED
CORONAVIRUS OC43, COMMON COLD VIRUS: NOT DETECTED
CREAT SERPL-MCNC: 1.7 MG/DL (ref 0.5–1.4)
DAT IGG-SP REAG RBC-IMP: NORMAL
DIFFERENTIAL METHOD: ABNORMAL
EOSINOPHIL # BLD AUTO: 0.2 K/UL (ref 0–0.5)
EOSINOPHIL NFR BLD: 2 % (ref 0–8)
ERYTHROCYTE [DISTWIDTH] IN BLOOD BY AUTOMATED COUNT: 16.1 % (ref 11.5–14.5)
EST. GFR  (AFRICAN AMERICAN): 33.5 ML/MIN/1.73 M^2
EST. GFR  (NON AFRICAN AMERICAN): 29.1 ML/MIN/1.73 M^2
ESTIMATED AVG GLUCOSE: 85 MG/DL (ref 68–131)
FLUBV RNA NPH QL NAA+NON-PROBE: NOT DETECTED
GLUCOSE SERPL-MCNC: 104 MG/DL (ref 70–110)
GLUCOSE UR QL STRIP: NEGATIVE
HAPTOGLOB SERPL-MCNC: 115 MG/DL (ref 30–250)
HAV IGM SERPL QL IA: NEGATIVE
HBA1C MFR BLD HPLC: 4.6 % (ref 4–5.6)
HBV CORE IGM SERPL QL IA: NEGATIVE
HBV SURFACE AG SERPL QL IA: NEGATIVE
HCT VFR BLD AUTO: 30.2 % (ref 37–48.5)
HCV AB SERPL QL IA: NEGATIVE
HGB BLD-MCNC: 8.8 G/DL (ref 12–16)
HGB UR QL STRIP: NEGATIVE
HPIV1 RNA NPH QL NAA+NON-PROBE: NOT DETECTED
HPIV2 RNA NPH QL NAA+NON-PROBE: NOT DETECTED
HPIV3 RNA NPH QL NAA+NON-PROBE: NOT DETECTED
HPIV4 RNA NPH QL NAA+NON-PROBE: NOT DETECTED
HUMAN METAPNEUMOVIRUS: NOT DETECTED
IMM GRANULOCYTES # BLD AUTO: 0.06 K/UL (ref 0–0.04)
IMM GRANULOCYTES NFR BLD AUTO: 0.5 % (ref 0–0.5)
INFLUENZA A (SUBTYPES H1,H1-2009,H3): NOT DETECTED
INFLUENZA A, MOLECULAR: NEGATIVE
INFLUENZA B, MOLECULAR: NEGATIVE
INR PPP: 1.2 (ref 0.8–1.2)
KETONES UR QL STRIP: NEGATIVE
LACTATE SERPL-SCNC: 0.9 MMOL/L (ref 0.5–2.2)
LACTATE SERPL-SCNC: 1.1 MMOL/L (ref 0.5–2.2)
LDH SERPL L TO P-CCNC: 326 U/L (ref 110–260)
LEUKOCYTE ESTERASE UR QL STRIP: NEGATIVE
LIPASE SERPL-CCNC: <3 U/L (ref 4–60)
LYMPHOCYTES # BLD AUTO: 0.6 K/UL (ref 1–4.8)
LYMPHOCYTES NFR BLD: 5.3 % (ref 18–48)
MCH RBC QN AUTO: 25.4 PG (ref 27–31)
MCHC RBC AUTO-ENTMCNC: 29.1 G/DL (ref 32–36)
MCV RBC AUTO: 87 FL (ref 82–98)
MONOCYTES # BLD AUTO: 1.3 K/UL (ref 0.3–1)
MONOCYTES NFR BLD: 12 % (ref 4–15)
MYCOPLASMA PNEUMONIAE: NOT DETECTED
NEUTROPHILS # BLD AUTO: 9 K/UL (ref 1.8–7.7)
NEUTROPHILS NFR BLD: 79.9 % (ref 38–73)
NITRITE UR QL STRIP: NEGATIVE
NRBC BLD-RTO: 0 /100 WBC
PH UR STRIP: 5 [PH] (ref 5–8)
PLATELET # BLD AUTO: 233 K/UL (ref 150–350)
PMV BLD AUTO: 12.1 FL (ref 9.2–12.9)
POTASSIUM SERPL-SCNC: 3.9 MMOL/L (ref 3.5–5.1)
PROCALCITONIN SERPL IA-MCNC: 4.75 NG/ML
PROT SERPL-MCNC: 6.1 G/DL (ref 6–8.4)
PROT UR QL STRIP: NEGATIVE
PROTHROMBIN TIME: 11.8 SEC (ref 9–12.5)
RBC # BLD AUTO: 3.47 M/UL (ref 4–5.4)
RESPIRATORY INFECTION PANEL SOURCE: NORMAL
RETICS/RBC NFR AUTO: 0.9 % (ref 0.5–2.5)
RSV RNA NPH QL NAA+NON-PROBE: NOT DETECTED
RV+EV RNA NPH QL NAA+NON-PROBE: NOT DETECTED
SODIUM SERPL-SCNC: 138 MMOL/L (ref 136–145)
SP GR UR STRIP: 1 (ref 1–1.03)
SPECIMEN SOURCE: NORMAL
URN SPEC COLLECT METH UR: NORMAL
WBC # BLD AUTO: 11.2 K/UL (ref 3.9–12.7)

## 2020-02-17 PROCEDURE — 99223 PR INITIAL HOSPITAL CARE,LEVL III: ICD-10-PCS | Mod: HCNC,,, | Performed by: EMERGENCY MEDICINE

## 2020-02-17 PROCEDURE — 63600175 PHARM REV CODE 636 W HCPCS: Mod: HCNC | Performed by: STUDENT IN AN ORGANIZED HEALTH CARE EDUCATION/TRAINING PROGRAM

## 2020-02-17 PROCEDURE — 85045 AUTOMATED RETICULOCYTE COUNT: CPT | Mod: HCNC

## 2020-02-17 PROCEDURE — 63600175 PHARM REV CODE 636 W HCPCS: Mod: HCNC | Performed by: PHYSICIAN ASSISTANT

## 2020-02-17 PROCEDURE — 99285 EMERGENCY DEPT VISIT HI MDM: CPT | Mod: 25,HCNC

## 2020-02-17 PROCEDURE — 80329 ANALGESICS NON-OPIOID 1 OR 2: CPT | Mod: HCNC

## 2020-02-17 PROCEDURE — 25000003 PHARM REV CODE 250: Mod: HCNC | Performed by: INTERNAL MEDICINE

## 2020-02-17 PROCEDURE — 63600175 PHARM REV CODE 636 W HCPCS: Mod: HCNC | Performed by: EMERGENCY MEDICINE

## 2020-02-17 PROCEDURE — 87502 INFLUENZA DNA AMP PROBE: CPT | Mod: HCNC

## 2020-02-17 PROCEDURE — 82150 ASSAY OF AMYLASE: CPT | Mod: HCNC

## 2020-02-17 PROCEDURE — 87086 URINE CULTURE/COLONY COUNT: CPT | Mod: HCNC

## 2020-02-17 PROCEDURE — 25000003 PHARM REV CODE 250: Mod: HCNC | Performed by: STUDENT IN AN ORGANIZED HEALTH CARE EDUCATION/TRAINING PROGRAM

## 2020-02-17 PROCEDURE — 83690 ASSAY OF LIPASE: CPT | Mod: HCNC

## 2020-02-17 PROCEDURE — 86880 COOMBS TEST DIRECT: CPT | Mod: HCNC

## 2020-02-17 PROCEDURE — 82550 ASSAY OF CK (CPK): CPT | Mod: HCNC

## 2020-02-17 PROCEDURE — 85025 COMPLETE CBC W/AUTO DIFF WBC: CPT | Mod: HCNC

## 2020-02-17 PROCEDURE — 99223 PR INITIAL HOSPITAL CARE,LEVL III: ICD-10-PCS | Mod: ,,, | Performed by: INTERNAL MEDICINE

## 2020-02-17 PROCEDURE — 85610 PROTHROMBIN TIME: CPT | Mod: HCNC

## 2020-02-17 PROCEDURE — 96365 THER/PROPH/DIAG IV INF INIT: CPT | Mod: HCNC

## 2020-02-17 PROCEDURE — 80053 COMPREHEN METABOLIC PANEL: CPT | Mod: HCNC

## 2020-02-17 PROCEDURE — 96367 TX/PROPH/DG ADDL SEQ IV INF: CPT | Mod: HCNC

## 2020-02-17 PROCEDURE — 84145 PROCALCITONIN (PCT): CPT | Mod: HCNC

## 2020-02-17 PROCEDURE — 25000003 PHARM REV CODE 250: Mod: HCNC | Performed by: PHYSICIAN ASSISTANT

## 2020-02-17 PROCEDURE — 83010 ASSAY OF HAPTOGLOBIN QUANT: CPT | Mod: HCNC

## 2020-02-17 PROCEDURE — 99223 1ST HOSP IP/OBS HIGH 75: CPT | Mod: HCNC,,, | Performed by: EMERGENCY MEDICINE

## 2020-02-17 PROCEDURE — 99285 EMERGENCY DEPT VISIT HI MDM: CPT | Mod: ,,, | Performed by: PHYSICIAN ASSISTANT

## 2020-02-17 PROCEDURE — 82248 BILIRUBIN DIRECT: CPT | Mod: HCNC

## 2020-02-17 PROCEDURE — 81003 URINALYSIS AUTO W/O SCOPE: CPT | Mod: HCNC

## 2020-02-17 PROCEDURE — 93010 EKG 12-LEAD: ICD-10-PCS | Mod: HCNC,,, | Performed by: INTERNAL MEDICINE

## 2020-02-17 PROCEDURE — 83615 LACTATE (LD) (LDH) ENZYME: CPT | Mod: HCNC

## 2020-02-17 PROCEDURE — 83036 HEMOGLOBIN GLYCOSYLATED A1C: CPT | Mod: HCNC

## 2020-02-17 PROCEDURE — 96366 THER/PROPH/DIAG IV INF ADDON: CPT | Mod: HCNC

## 2020-02-17 PROCEDURE — 87633 RESP VIRUS 12-25 TARGETS: CPT | Mod: HCNC

## 2020-02-17 PROCEDURE — 20600001 HC STEP DOWN PRIVATE ROOM: Mod: HCNC

## 2020-02-17 PROCEDURE — 99223 1ST HOSP IP/OBS HIGH 75: CPT | Mod: ,,, | Performed by: INTERNAL MEDICINE

## 2020-02-17 PROCEDURE — 80074 ACUTE HEPATITIS PANEL: CPT | Mod: HCNC

## 2020-02-17 PROCEDURE — 93010 ELECTROCARDIOGRAM REPORT: CPT | Mod: HCNC,,, | Performed by: INTERNAL MEDICINE

## 2020-02-17 PROCEDURE — 83605 ASSAY OF LACTIC ACID: CPT | Mod: HCNC

## 2020-02-17 PROCEDURE — 99285 PR EMERGENCY DEPT VISIT,LEVEL V: ICD-10-PCS | Mod: ,,, | Performed by: PHYSICIAN ASSISTANT

## 2020-02-17 PROCEDURE — 87040 BLOOD CULTURE FOR BACTERIA: CPT | Mod: HCNC

## 2020-02-17 PROCEDURE — 96361 HYDRATE IV INFUSION ADD-ON: CPT | Mod: HCNC

## 2020-02-17 PROCEDURE — 36415 COLL VENOUS BLD VENIPUNCTURE: CPT | Mod: HCNC

## 2020-02-17 PROCEDURE — 93005 ELECTROCARDIOGRAM TRACING: CPT | Mod: HCNC

## 2020-02-17 RX ORDER — LOSARTAN POTASSIUM 50 MG/1
50 TABLET ORAL NIGHTLY
Status: DISCONTINUED | OUTPATIENT
Start: 2020-02-17 | End: 2020-02-19 | Stop reason: HOSPADM

## 2020-02-17 RX ORDER — PROMETHAZINE HYDROCHLORIDE 25 MG/1
25 TABLET ORAL EVERY 6 HOURS PRN
Status: DISCONTINUED | OUTPATIENT
Start: 2020-02-17 | End: 2020-02-19 | Stop reason: HOSPADM

## 2020-02-17 RX ORDER — ACETAMINOPHEN 325 MG/1
650 TABLET ORAL EVERY 4 HOURS PRN
Status: DISCONTINUED | OUTPATIENT
Start: 2020-02-17 | End: 2020-02-19 | Stop reason: HOSPADM

## 2020-02-17 RX ORDER — POLYETHYLENE GLYCOL 3350 17 G/17G
17 POWDER, FOR SOLUTION ORAL DAILY
Status: DISCONTINUED | OUTPATIENT
Start: 2020-02-17 | End: 2020-02-19 | Stop reason: HOSPADM

## 2020-02-17 RX ORDER — LEVETIRACETAM 500 MG/1
500 TABLET ORAL 2 TIMES DAILY
Status: DISCONTINUED | OUTPATIENT
Start: 2020-02-17 | End: 2020-02-19 | Stop reason: HOSPADM

## 2020-02-17 RX ORDER — SODIUM CHLORIDE 0.9 % (FLUSH) 0.9 %
10 SYRINGE (ML) INJECTION
Status: DISCONTINUED | OUTPATIENT
Start: 2020-02-17 | End: 2020-02-19 | Stop reason: HOSPADM

## 2020-02-17 RX ORDER — AMITRIPTYLINE HYDROCHLORIDE 50 MG/1
50 TABLET, FILM COATED ORAL NIGHTLY
Status: DISCONTINUED | OUTPATIENT
Start: 2020-02-17 | End: 2020-02-19 | Stop reason: HOSPADM

## 2020-02-17 RX ORDER — IBUPROFEN 200 MG
24 TABLET ORAL
Status: DISCONTINUED | OUTPATIENT
Start: 2020-02-17 | End: 2020-02-19 | Stop reason: HOSPADM

## 2020-02-17 RX ORDER — ACETAMINOPHEN 325 MG/1
650 TABLET ORAL EVERY 4 HOURS PRN
Status: DISCONTINUED | OUTPATIENT
Start: 2020-02-17 | End: 2020-02-17

## 2020-02-17 RX ORDER — DRONABINOL 2.5 MG/1
5 CAPSULE ORAL
Status: DISCONTINUED | OUTPATIENT
Start: 2020-02-17 | End: 2020-02-19 | Stop reason: HOSPADM

## 2020-02-17 RX ORDER — IPRATROPIUM BROMIDE AND ALBUTEROL SULFATE 2.5; .5 MG/3ML; MG/3ML
3 SOLUTION RESPIRATORY (INHALATION) EVERY 6 HOURS PRN
Status: DISCONTINUED | OUTPATIENT
Start: 2020-02-17 | End: 2020-02-19 | Stop reason: HOSPADM

## 2020-02-17 RX ORDER — INSULIN ASPART 100 [IU]/ML
0-5 INJECTION, SOLUTION INTRAVENOUS; SUBCUTANEOUS
Status: DISCONTINUED | OUTPATIENT
Start: 2020-02-17 | End: 2020-02-19 | Stop reason: HOSPADM

## 2020-02-17 RX ORDER — CALCIUM CARBONATE 500(1250)
1000 TABLET ORAL DAILY
Status: DISCONTINUED | OUTPATIENT
Start: 2020-02-17 | End: 2020-02-19 | Stop reason: HOSPADM

## 2020-02-17 RX ORDER — AMOXICILLIN 250 MG
1 CAPSULE ORAL DAILY
Status: DISCONTINUED | OUTPATIENT
Start: 2020-02-17 | End: 2020-02-19 | Stop reason: HOSPADM

## 2020-02-17 RX ORDER — ONDANSETRON 2 MG/ML
4 INJECTION INTRAMUSCULAR; INTRAVENOUS EVERY 8 HOURS PRN
Status: DISCONTINUED | OUTPATIENT
Start: 2020-02-17 | End: 2020-02-19 | Stop reason: HOSPADM

## 2020-02-17 RX ORDER — ACETAMINOPHEN 500 MG
1000 TABLET ORAL
Status: COMPLETED | OUTPATIENT
Start: 2020-02-17 | End: 2020-02-17

## 2020-02-17 RX ORDER — GLUCAGON 1 MG
1 KIT INJECTION
Status: DISCONTINUED | OUTPATIENT
Start: 2020-02-17 | End: 2020-02-19 | Stop reason: HOSPADM

## 2020-02-17 RX ORDER — IBUPROFEN 200 MG
16 TABLET ORAL
Status: DISCONTINUED | OUTPATIENT
Start: 2020-02-17 | End: 2020-02-19 | Stop reason: HOSPADM

## 2020-02-17 RX ORDER — LORAZEPAM 1 MG/1
1 TABLET ORAL 2 TIMES DAILY
Status: DISCONTINUED | OUTPATIENT
Start: 2020-02-17 | End: 2020-02-19 | Stop reason: HOSPADM

## 2020-02-17 RX ADMIN — PIPERACILLIN AND TAZOBACTAM 4.5 G: 4; .5 INJECTION, POWDER, LYOPHILIZED, FOR SOLUTION INTRAVENOUS; PARENTERAL at 01:02

## 2020-02-17 RX ADMIN — PIPERACILLIN AND TAZOBACTAM 4.5 G: 4; .5 INJECTION, POWDER, LYOPHILIZED, FOR SOLUTION INTRAVENOUS; PARENTERAL at 07:02

## 2020-02-17 RX ADMIN — CALCIUM 1000 MG: 500 TABLET ORAL at 09:02

## 2020-02-17 RX ADMIN — PANCRELIPASE 1 CAPSULE: 60000; 12000; 38000 CAPSULE, DELAYED RELEASE PELLETS ORAL at 05:02

## 2020-02-17 RX ADMIN — LEVETIRACETAM 500 MG: 500 TABLET ORAL at 09:02

## 2020-02-17 RX ADMIN — DRONABINOL 5 MG: 2.5 CAPSULE ORAL at 05:02

## 2020-02-17 RX ADMIN — SODIUM CHLORIDE 2004 ML: 0.9 INJECTION, SOLUTION INTRAVENOUS at 03:02

## 2020-02-17 RX ADMIN — VANCOMYCIN HYDROCHLORIDE 500 MG: 500 INJECTION, POWDER, LYOPHILIZED, FOR SOLUTION INTRAVENOUS at 05:02

## 2020-02-17 RX ADMIN — LOSARTAN POTASSIUM 50 MG: 50 TABLET ORAL at 09:02

## 2020-02-17 RX ADMIN — THERA TABS 1 TABLET: TAB at 09:02

## 2020-02-17 RX ADMIN — ACETAMINOPHEN 1000 MG: 500 TABLET ORAL at 03:02

## 2020-02-17 RX ADMIN — APIXABAN 5 MG: 5 TABLET, FILM COATED ORAL at 09:02

## 2020-02-17 RX ADMIN — LORAZEPAM 1 MG: 1 TABLET ORAL at 09:02

## 2020-02-17 RX ADMIN — AMITRIPTYLINE HYDROCHLORIDE 50 MG: 50 TABLET, FILM COATED ORAL at 09:02

## 2020-02-17 RX ADMIN — PIPERACILLIN AND TAZOBACTAM 4.5 G: 4; .5 INJECTION, POWDER, LYOPHILIZED, FOR SOLUTION INTRAVENOUS; PARENTERAL at 03:02

## 2020-02-17 NOTE — ED NOTES
Patient Identifiers for Naty St checked and correct  LOC: The patient is awake, alert and aware of environment with an appropriate affect, the patient is oriented x 3 and speaking appropriate.  APPEARANCE: Patient resting comfortably and in no acute distress, patient is clean and well groomed, patient's clothing is properly fastened.  SKIN: The skin is hot and dry, patient has normal skin turgor and moist mucus membranes,no rashes or lesions.Skin Intact , No Breakdown Noted  Musculoskeletal :  Normal range of motion noted. Moves all extremeties well, No swelling or tenderness noted  RESPIRATORY: Airway is open and patent, respirations are spontaneous, patient has a normal effort and rate.  CARDIAC: Patient has a normal rate and rhythm, no periphreal edema noted, capillary refill < 3 seconds.   ABDOMEN: pt reports abdominal pain  PULSES: 2+  And symmetrical in all extremeties  NEUROLOGIC: PERRL, facial expression is symmetrical, patient moving all extremities, normal sensation in all extremities when touched with a finger.The patient is awake, alert and cooperative with a calm affect, patient is aware of environment.    Will continue to monitor

## 2020-02-17 NOTE — H&P
[facility]  Hematology/Oncology  H&P    Patient Name: Naty St  MRN: [id number]  Admission Date: 2/17/2020  Code Status: DNR   Attending Provider: RONALDO Roberts MD  Primary Care Physician: Olvin Mars MD  Principal Problem:Severe sepsis    Subjective:     HPI: Ms. St is a 74 year-old female who presented to [facility] ED on 2/17 with fever and confusion. PMHx includes recent bacteremia s/p p.o. Cipro, metastatic R lung adenocarcinoma on Xgeva and Tarceva, breast ca s/p L lumpectomy, meningioma s/p resection, pancreatic ca s/p whipple, and s/p hysterectomy w/ b/l salpingo-oophorectomy, HTN.    the patient states she completed her course of ciprofloxacin without issues and has been feeling well off antimicrobial therapy for more than a week's time.  On the evening of admission, the patient awoke to use the restroom and shortly thereafter was found by her  to be disoriented and confused.  Her  noted she was febrile with a home measured temperature of 103°.  The patient's  abruptly brought her to the emergency room.  On arrival, patient reported transient mild nausea and epigastric abdominal pain. She was not noted to be experiencing chest pain, shortness of breath, dizziness, weakness, headache, eye pain, ear pain, sore throat, dysuria, hematuria.  In the emergency room, the patient was febrile with a temperature of >102° .  She was started on vancomycin and Zosyn and her fever curve subsequently decreased.  When the patient was seen by medical oncology in the emergency department (after receiving vanc and zosyn) she was no longer encephalopathic.       Oncology Treatment Plan:   [No treatment plan]    Medications:  Continuous Infusions:  Scheduled Meds:   amitriptyline  50 mg Oral QHS    apixaban  5 mg Oral BID    calcium carbonate  1,000 mg Oral Daily    dronabinol  5 mg Oral BID AC    levETIRAcetam  500 mg Oral BID    lipase-protease-amylase  12,000-38,000-60,000 units  1 capsule Oral TID WM    LORazepam  1 mg Oral BID    multivitamin  1 tablet Oral Daily    piperacillin-tazobactam (ZOSYN) IVPB  4.5 g Intravenous Q8H    polyethylene glycol  17 g Oral Daily    senna-docusate 8.6-50 mg  1 tablet Oral Daily     PRN Meds:acetaminophen, albuterol-ipratropium, Dextrose 10% Bolus, Dextrose 10% Bolus, glucagon (human recombinant), glucose, glucose, insulin aspart U-100, ondansetron, promethazine, sodium chloride 0.9%, Pharmacy to dose Vancomycin consult **AND** vancomycin - pharmacy to dose     Review of patient's allergies indicates:   Allergen Reactions    Tobradex [tobramycin-dexamethasone] Swelling    Iodinated contrast media Hives    Latex Rash and Hives    Phenytoin sodium extended Other (See Comments) and Rash        Past Medical History:   Diagnosis Date    Anticoagulant long-term use     Blood clot in vein 06/2016    Breast cancer 1994    Cataract     Hypertension     Lung cancer     Lung cancer metastatic to brain     Pancreatic cancer 1998    Posterior capsular opacification, left eye     Psychiatric problem     Seizures 01/25/2016    Stroke     Therapy      Past Surgical History:   Procedure Laterality Date    BONE BIOSPY  3/9/16    BRAIN SURGERY  1/12/16    tumor removal 1/12/16    BREAST LUMPECTOMY  1998    left    CATARACT EXTRACTION Bilateral 2004    yag OU    CHOLECYSTECTOMY  1998    COLONOSCOPY N/A 11/13/2015    Procedure: COLONOSCOPY;  Surgeon: Alex Carmona MD;  Location: [address]);  Service: Endoscopy;  Laterality: N/A;  2 year f/u    COLONOSCOPY N/A 11/7/2018    Procedure: COLONOSCOPY;  Surgeon: Jim Raman MD;  Location: [address]);  Service: Endoscopy;  Laterality: N/A;  Eliquis - per Dr. George santiago to hold Eliquis x 2 days prior to colon- ERW    cysto and right ureteral stent  4/27/12    ecoli  2010    removal of blockage, done throat, then through the liver.    HYSTERECTOMY  1974     KIDNEY STONE SURGERY  --laser    left eswl  12    OOPHORECTOMY  2012    Laparoscopic BSO, lysis of adhesions, cystoscopy greater than 35mins     WHIPPLE PROCEDURE W/ LAPAROSCOPY       Family History     Problem Relation (Age of Onset)    Breast cancer Mother    Leukemia Paternal Grandfather    Lung cancer Mother    Stomach cancer Paternal Grandmother        Tobacco Use    Smoking status: Former Smoker     Packs/day: 0.00     Years: 0.00     Pack years: 0.00     Last attempt to quit: 1961     Years since quittin.4    Smokeless tobacco: Never Used   Substance and Sexual Activity    Alcohol use: No    Drug use: No    Sexual activity: Not Currently     Partners: Male     Birth control/protection: See Surgical Hx       Review of Systems   Constitutional: Positive for fever. Negative for activity change, appetite change, chills, diaphoresis and fatigue.   HENT: Negative for congestion.    Eyes: Negative for visual disturbance.   Respiratory: Positive for cough ( Nonproductive). Negative for chest tightness, shortness of breath and wheezing.    Cardiovascular: Negative for chest pain, palpitations and leg swelling.   Gastrointestinal: Negative for abdominal distention and abdominal pain.   Genitourinary: Negative for dysuria and frequency.   Musculoskeletal: Negative for myalgias.   Neurological: Negative for seizures, weakness and headaches.   Hematological: Negative for adenopathy. Bruises/bleeds easily.   Psychiatric/Behavioral: Positive for confusion. Negative for agitation and behavioral problems.     Objective:     Vital Signs (Most Recent):  Temp: 98.1 °F (36.7 °C) (20 1115)  Pulse: 73 (20 1115)  Resp: 17 (20 1115)  BP: (!) 109/59 (20 1115)  SpO2: 96 % (20 1115) Vital Signs (24h Range):  Temp:  [98 °F (36.7 °C)-102.3 °F (39.1 °C)] 98.1 °F (36.7 °C)  Pulse:  [] 73  Resp:  [14-22] 17  SpO2:  [96 %-100 %] 96 %  BP: (107-141)/(56-78) 109/59      Weight: 66.8 kg (147 lb 4.3 oz)  Body mass index is 23.77 kg/m².  Body surface area is 1.76 meters squared.      Intake/Output Summary (Last 24 hours) at 2/17/2020 1325  Last data filed at 2/17/2020 0427  Gross per 24 hour   Intake 425 ml   Output --   Net 425 ml       Physical Exam   Constitutional: She is oriented to person, place, and time. She appears well-developed and well-nourished. No distress.   HENT:   Head: Normocephalic.   Mouth/Throat: No oropharyngeal exudate.   Eyes: Pupils are equal, round, and reactive to light. EOM are normal. No scleral icterus.   Neck: Normal range of motion. Neck supple. No JVD present.   Cardiovascular: Normal rate, regular rhythm and intact distal pulses.   Pulmonary/Chest: Effort normal and breath sounds normal. No respiratory distress.   Abdominal: Soft. Bowel sounds are normal.   Musculoskeletal: She exhibits no edema.   Neurological: She is alert and oriented to person, place, and time.   Skin: Skin is warm and dry. Capillary refill takes less than 2 seconds. No rash noted. She is not diaphoretic. No erythema.   Psychiatric: She has a normal mood and affect.       Significant Labs:   Recent Lab Results       02/17/20  0844   02/17/20  0634   02/17/20  0422   02/17/20  0335   02/17/20  0330        Influenza A, Molecular               Influenza B, Molecular               Procalcitonin 4.75  Comment:  A concentration < 0.25 ng/mL represents a low risk bacterial   infection.  Procalcitonin may not be accurate among patients with localized   infection, recent trauma or major surgery, immunosuppressed state,   invasive fungal infection, renal dysfunction. Decisions regarding   initiation or continuation of antibiotic therapy should not be based   solely on procalcitonin levels.               Acetaminophen (Tylenol), Serum     19.0  Comment:  Toxic Levels:  Adults (4 hr post-ingestion).........>150 ug/mL  Adults (12 hr post-ingestion)........>40 ug/mL  Peds (2 hr  post-ingestion, liquid)...>225 ug/mL           Albumin               Alkaline Phosphatase               ALT               Amylase     81         Anion Gap               Appearance, UA       Clear       AST               Baso #               Basophil%               Bilirubin (UA)       Negative       Bilirubin, Direct 1.5             BILIRUBIN TOTAL               Blood Culture, Routine         No Growth to date[P]     BUN, Bld               Calcium               Chloride               CO2               Color, UA       Yellow       Creatinine               Differential Method               eGFR if                eGFR if non                Eos #               Eosinophil%               Estimated Avg Glucose               Flu A & B Source               Glucose               Glucose, UA       Negative       Gran # (ANC)               Gran%               Hematocrit               Hemoglobin               Hemoglobin A1C External               Hep A IgM     Negative         Hep B C IgM     Negative         Hepatitis B Surface Ag     Negative         Hepatitis C Ab     Negative         Immature Grans (Abs)               Immature Granulocytes               INR               Ketones, UA       Negative       Lactate, Pawel   0.9  Comment:  Falsely low lactic acid results can be found in samples   containing >=13.0 mg/dL total bilirubin and/or >=3.5 mg/dL   direct bilirubin.             Leukocytes, UA       Negative       Lipase     <3         Lymph #               Lymph%               MCH               MCHC               MCV               Mono #               Mono%               MPV               NITRITE UA       Negative       nRBC               Occult Blood UA       Negative       pH, UA       5.0       Platelets               Potassium               PROTEIN TOTAL               Protein, UA       Negative  Comment:  Recommend a 24 hour urine protein or a urine   protein/creatinine ratio if globulin  induced proteinuria is  clinically suspected.         Protime               RBC               RDW               Sodium               Specific [place], UA       1.005       Specimen UA       Urine, Clean Catch       WBC                                02/17/20  0317   02/17/20  0308   02/17/20  0304        Influenza A, Molecular Negative         Influenza B, Molecular Negative         Procalcitonin           Acetaminophen (Tylenol), Serum           Albumin     2.8     Alkaline Phosphatase     720     ALT     383     Amylase           Anion Gap     11     Appearance, UA           AST     845     Baso #     0.03     Basophil%     0.3     Bilirubin (UA)           Bilirubin, Direct           BILIRUBIN TOTAL     2.7  Comment:  For infants and newborns, interpretation of results should be based  on gestational age, weight and in agreement with clinical  observations.  Premature Infant recommended reference ranges:  Up to 24 hours.............<8.0 mg/dL  Up to 48 hours............<12.0 mg/dL  3-5 days..................<15.0 mg/dL  6-29 days.................<15.0 mg/dL       Blood Culture, Routine   No Growth to date[P]       BUN, Bld     29     Calcium     8.4     Chloride     110     CO2     17     Color, UA           Creatinine     1.7     Differential Method     Automated     eGFR if      33.5     eGFR if non      29.1  Comment:  Calculation used to obtain the estimated glomerular filtration  rate (eGFR) is the CKD-EPI equation.        Eos #     0.2     Eosinophil%     2.0     Estimated Avg Glucose     85     Flu A & B Source Nasal swab         Glucose     104     Glucose, UA           Gran # (ANC)     9.0     Gran%     79.9     Hematocrit     30.2     Hemoglobin     8.8     Hemoglobin A1C External     4.6  Comment:  ADA Screening Guidelines:  5.7-6.4%  Consistent with prediabetes  >or=6.5%  Consistent with diabetes  High levels of fetal hemoglobin interfere with the HbA1C  assay.  Heterozygous hemoglobin variants (HbS, HgC, etc)do  not significantly interfere with this assay.   However, presence of multiple variants may affect accuracy.       Hep A IgM           Hep B C IgM           Hepatitis B Surface Ag           Hepatitis C Ab           Immature Grans (Abs)     0.06  Comment:  Mild elevation in immature granulocytes is non specific and   can be seen in a variety of conditions including stress response,   acute inflammation, trauma and pregnancy. Correlation with other   laboratory and clinical findings is essential.       Immature Granulocytes     0.5     INR     1.2  Comment:  Coumadin Therapy:  2.0 - 3.0 for INR for all indicators except mechanical heart valves  and antiphospholipid syndromes which should use 2.5 - 3.5.       Ketones, UA           Lactate, Pawel     1.1  Comment:  Falsely low lactic acid results can be found in samples   containing >=13.0 mg/dL total bilirubin and/or >=3.5 mg/dL   direct bilirubin.       Leukocytes, UA           Lipase           Lymph #     0.6     Lymph%     5.3     MCH     25.4     MCHC     29.1     MCV     87     Mono #     1.3     Mono%     12.0     MPV     12.1     NITRITE UA           nRBC     0     Occult Blood UA           pH, UA           Platelets     233     Potassium     3.9     PROTEIN TOTAL     6.1     Protein, UA           Protime     11.8     RBC     3.47     RDW     16.1     Sodium     138     Specific Gravity, UA           Specimen UA           WBC     11.20        and All pertinent labs from the last 24 hours have been reviewed.    Diagnostic Results:  I have reviewed all pertinent imaging results/findings within the past 24 hours.    Assessment/Plan:     * Severe sepsis  74F with recent bacteremia admitted with fever and encephalopathy. Patient received IV fluids, vancomycin and Zosyn in the emergency department.    Plan:  Continue Zosyn  Procal elevated  Follow-up cultures  Q4 vitals   Daily CBC    Adenocarcinoma of lung  See oncology  history    Elevated LFTs  Avoid hepatotoxic drugs  Monitor with daily CMP    Secondary malignant neoplasm of intrathoracic lymph nodes  See oncology history    History of deep vein thrombosis (DVT) of lower extremity  Continue home apixaban    History of stroke  Continue home apixaban  Holding statin in the setting of transaminitis    Seizure disorder  Continue home Keppra  Continue home Ativan    CKD (chronic kidney disease) stage 3, GFR 30-59 ml/min  - Creatine stable at baseline  - Estimated Creatinine Clearance: 26.8 mL/min (A) (based on SCr of 1.7 mg/dL (H)).   - Monitor UOP and serial BMP and adjust therapy as needed.   - Renally dose meds.        Meningioma  See oncology history    History of breast cancer  See Oncology history        Simone Boyce MD  Hematology/Oncology  [facility]    Attending attestation:  I have personally seen and examined the patient and reviewed her chart and labs.  We did discuss her care with her admitting team.  She was admitted with fevers and has a history of recurring admissions secondary to infection.  She actually looked clinically well and I saw her the emergency department after she had received antibiotics and IV fluids.  [facility] was [facility] and we will continue her antibiotics for now.  She does have a history of infections will lead threshold for consult Infectious Diseases she should not continue to improve as she thus far has.  I agree with the above note.

## 2020-02-17 NOTE — ED PROVIDER NOTES
Encounter Date: 2/17/2020       History     Chief Complaint   Patient presents with    Fever     Patient reports that she woke up tonight not feeling well. States that she was recently treated for sepsis. Has history of multiple types of cancer.      Ms St is a 74yoF who presents with fever; pertinent PMHx recent bacteremia s/p p.o. Cipro, metastatic R lung adenocarcinoma on Xgeva and Tarceva, breast ca s/p L lumpectomy, meningioma s/p resection, pancreatic ca s/p whipple, and s/p hysterectomy w/ b/l salpingo-oophorectomy, HTN.  Patient has been feeling well since completing course of Cipro.  She went to use the restroom this evening.  When she returned to bed, her  notes she was mildly disoriented, and she felt warm.  Measured temperature of 103°F.  On arrival, patient reports mild nausea and epigastric abdominal pain that has since subsided.  Associated with generalized fatigue.  Reports mild chronic nasal congestion in addition to left ear mild pruritus.  She denies chest pain, shortness of breath, dizziness, weakness, headache, eye pain, ear pain, sore throat, dysuria, hematuria.  The patients available PMH, PSH, Social History, medications, allergies, and triage vital signs were reviewed just prior to their medical evaluation.  A ten point review of systems was completed and is negative except as documented above.  Patient denies any other acute medical complaint.    Please be advised this text was dictated with Interview Master software and may contain errors due to translation.           Review of patient's allergies indicates:   Allergen Reactions    Tobradex [tobramycin-dexamethasone] Swelling    Iodinated contrast media Hives    Latex Rash and Hives    Phenytoin sodium extended Other (See Comments) and Rash     Past Medical History:   Diagnosis Date    Anticoagulant long-term use     Blood clot in vein 06/2016    Breast cancer 1994    Cataract     Hypertension     Lung cancer     Lung cancer  metastatic to brain     Pancreatic cancer     Posterior capsular opacification, left eye     Psychiatric problem     Seizures 2016    Stroke     Therapy      Past Surgical History:   Procedure Laterality Date    BONE BIOSPY  3/9/16    BRAIN SURGERY  16    tumor removal 16    BREAST LUMPECTOMY  1998    left    CATARACT EXTRACTION Bilateral 2004    yag OU    CHOLECYSTECTOMY  1998    COLONOSCOPY N/A 2015    Procedure: COLONOSCOPY;  Surgeon: Alex Carmona MD;  [facility]  Service: Endoscopy;  Laterality: N/A;  2 year f/u    COLONOSCOPY N/A 2018    Procedure: COLONOSCOPY;  Surgeon: Jim Raman MD;  [facility]  Service: Endoscopy;  Laterality: N/A;  Eliquis - per Dr. George santiago to hold Eliquis x 2 days prior to colon- ERW    cysto and right ureteral stent  12    ecoli      removal of blockage, done throat, then through the liver.    HYSTERECTOMY  1974    KIDNEY STONE SURGERY  --laser    left eswl  12    OOPHORECTOMY  2012    Laparoscopic BSO, lysis of adhesions, cystoscopy greater than 35mins     WHIPPLE PROCEDURE W/ LAPAROSCOPY       Family History   Problem Relation Age of Onset    Breast cancer Mother     Lung cancer Mother     Leukemia Paternal Grandfather     Stomach cancer Paternal Grandmother     Colon cancer Neg Hx     Ovarian cancer Neg Hx      Social History     Tobacco Use    Smoking status: Former Smoker     Packs/day: 0.00     Years: 0.00     Pack years: 0.00     Last attempt to quit: 1961     Years since quittin.4    Smokeless tobacco: Never Used   Substance Use Topics    Alcohol use: No    Drug use: No     Review of Systems   Constitutional: Positive for chills, fatigue and fever.   HENT: Positive for congestion. Negative for sinus pressure, sinus pain, sore throat and trouble swallowing.    Respiratory: Negative for cough, chest tightness and shortness of breath.     Cardiovascular: Negative for chest pain and palpitations.   Gastrointestinal: Positive for nausea. Negative for constipation, diarrhea and vomiting. Abdominal pain: since resolved.   Genitourinary: Negative for dysuria, flank pain, frequency and hematuria.   Musculoskeletal: Negative for back pain and neck pain.   Skin: Negative for pallor and rash.   Neurological: Negative for dizziness and weakness.   Psychiatric/Behavioral: Positive for confusion (since resolved).       Physical Exam     Initial Vitals [02/17/20 0224]   BP Pulse Resp Temp SpO2   (!) 141/78 (!) 112 (!) 22 (!) 102.3 °F (39.1 °C) 96 %      MAP       --         Physical Exam    Vitals reviewed.  Constitutional: She appears well-developed. She is not diaphoretic. No distress.   Chronically ill appearing   HENT:   Head: Normocephalic and atraumatic.   Right Ear: External ear normal.   Left Ear: External ear normal.   Nose: Nose normal.   Mouth/Throat: Oropharynx is clear and moist. No oropharyngeal exudate.   Eyes: EOM are normal. Pupils are equal, round, and reactive to light. Scleral icterus (New) is present.   Cardiovascular: Normal rate, regular rhythm and intact distal pulses.   Pulmonary/Chest: Breath sounds normal. No stridor. She has no wheezes. She has no rhonchi. She has no rales.   Abdominal: Soft. Bowel sounds are normal. There is no tenderness. There is no rebound and no guarding.   Musculoskeletal: She exhibits no edema.   Lymphadenopathy:     She has no cervical adenopathy.   Neurological: She is alert and oriented to person, place, and time. She has normal strength. No cranial nerve deficit or sensory deficit.   Skin: Skin is warm and dry. Capillary refill takes less than 2 seconds. No rash noted. No erythema.   Psychiatric: She has a normal mood and affect. Her behavior is normal. Judgment and thought content normal.         ED Course   Procedures  Labs Reviewed   CBC W/ AUTO DIFFERENTIAL - Abnormal; Notable for the following  components:       Result Value    RBC 3.47 (*)     Hemoglobin 8.8 (*)     Hematocrit 30.2 (*)     Mean Corpuscular Hemoglobin 25.4 (*)     Mean Corpuscular Hemoglobin Conc 29.1 (*)     RDW 16.1 (*)     Gran # (ANC) 9.0 (*)     Immature Grans (Abs) 0.06 (*)     Lymph # 0.6 (*)     Mono # 1.3 (*)     Gran% 79.9 (*)     Lymph% 5.3 (*)     All other components within normal limits   COMPREHENSIVE METABOLIC PANEL - Abnormal; Notable for the following components:    CO2 17 (*)     BUN, Bld 29 (*)     Creatinine 1.7 (*)     Calcium 8.4 (*)     Albumin 2.8 (*)     Total Bilirubin 2.7 (*)     Alkaline Phosphatase 720 (*)      (*)      (*)     eGFR if  33.5 (*)     eGFR if non  29.1 (*)     All other components within normal limits   LIPASE - Abnormal; Notable for the following components:    Lipase <3 (*)     All other components within normal limits   INFLUENZA A & B BY MOLECULAR   CULTURE, URINE   CULTURE, BLOOD   CULTURE, BLOOD   CULTURE, URINE   LACTIC ACID, PLASMA   LACTIC ACID, PLASMA   URINALYSIS, REFLEX TO URINE CULTURE    Narrative:     Preferred Collection Type->Urine, Clean Catch   AMYLASE   ACETAMINOPHEN LEVEL   PROTIME-INR   PROTIME-INR    Narrative:     add on pt per dulce valaldares md  02/17/2020  06:42    BILIRUBIN, DIRECT   HEPATITIS PANEL, ACUTE          Imaging Results          US Abdomen Limited (Final result)  Result time 02/17/20 05:37:35    Final result by Alex Case MD (02/17/20 05:37:35)                 Impression:      No significant sonographic abnormality.    Stable minimal intrahepatic biliary duct dilatation in this patient status post Whipple procedure.    Electronically signed by resident: Maurisio Bergeron MD  Date:    02/17/2020  Time:    05:21    Electronically signed by: Alex Case MD  Date:    02/17/2020  Time:    05:37             Narrative:    EXAMINATION:  US ABDOMEN LIMITED    CLINICAL HISTORY:  RUQ;.    TECHNIQUE:  Limited  "ultrasound of the right upper quadrant of the abdomen including pancreas, liver, gallbladder, common bile duct was performed.    COMPARISON:  CT chest abdomen pelvis without contrast 01/17/2020, ultrasound abdomen 01/17/2020.    FINDINGS:  Liver: Normal in size, measuring 14.7 cm. Homogeneous echotexture. No focal hepatic lesions.  Hepatorenal index is within normal limits measuring 0.85.    Gallbladder/biliary system: The gallbladder is surgically absent and there is stable minimal intrahepatic biliary dilatation in keeping with history of Whipple procedure.  Common bile duct is within normal limits measuring 5 mm.    Spleen: Normal in size with a homogeneous echotexture, measuring 6.0 x 2.7 cm.    Pancreas: Postoperative change of Whipple procedure.  Visualized portion appears within normal limits    Miscellaneous: No ascites.                               X-Ray Chest PA And Lateral (Final result)  Result time 02/17/20 04:22:04    Final result by Everett Granger MD (02/17/20 04:22:04)                 Impression:      No acute cardiopulmonary finding.  No detrimental change when compared with 01/16/2020.      Electronically signed by: Everett Granger MD  Date:    02/17/2020  Time:    04:22             Narrative:    EXAMINATION:  XR CHEST PA AND LATERAL    CLINICAL HISTORY:  Provided history is "  Sepsis, unspecified organism".    TECHNIQUE:  Frontal and lateral views of the chest were performed.    COMPARISON:  01/16/2020.    FINDINGS:  Cardiac wires overlie the chest.  Cardiomediastinal silhouette is not enlarged.  There is minimal platelike subsegmental atelectasis versus scarring at the left lung base.  No focal consolidation.  No sizable pleural effusion.  No pneumothorax.  No detrimental change in lung aeration when compared with the prior study.  Surgical clips overlie the left chest wall.                                 Medical Decision Making:   History:   Old Medical Records: I decided to obtain old " medical records.  Old Records Summarized: records from clinic visits and records from previous admission(s).  Initial Assessment:   Patient on active chemo for metastatic lung cancer presents for fever, new scleral icterus and nausea.  Tachycardic, T-max 101.3°, BP stable  Differential Diagnosis:   DDx bacteremia vs viral syndrome, hyperbilirubinemia. Physical exam and history taking lower clinical suspicion for shock, acute abdomen.   Independently Interpreted Test(s):   I have ordered and independently interpreted X-rays - see prior notes.  Clinical Tests:   Lab Tests: Ordered and Reviewed  Radiological Study: Ordered and Reviewed  ED Management:  Sepsis orders initiated including IVF bolus.  Labs reveal relative leukocytosis with shift, Given broad-spectrum antibiotics Zosyn and vancomycin.  Patient is acidotic with bicarb 17, stable renal function, elevation transaminases, alk-phos and T bili.  Discussed case with Oncology who will see the patient at bedside and admit to their service. Patient agreed to plan of care and voiced understanding.  Update:  Improvement in tachycardia.  Ultrasound right upper quadrant unremarkable for acute findings.  Flu negative.  UA without evidence of infection, high risk urine culture sent.     Lima Connell PA-C  02/17/2020    I discussed the following case, diagnosis and plan of care with attending physician.    Other:   I have discussed this case with another health care provider.              Attending Attestation:     Physician Attestation Statement for NP/PA:   I discussed this assessment and plan of this patient with the NP/PA, but I did not personally examine the patient. The face to face encounter was performed by the NP/PA.                                Clinical Impression:       ICD-10-CM ICD-9-CM   1. Adenocarcinoma of lung, unspecified laterality C34.90 162.9   2. Tachycardia R00.0 785.0   3. Sepsis A41.9 038.9     995.91         Disposition:   Disposition:  Admitted  Condition: Chris Connell PA-C  02/17/20 0710       Zaira Woods MD  02/18/20 1240

## 2020-02-17 NOTE — ASSESSMENT & PLAN NOTE
74F with recent bacteremia admitted with fever and encephalopathy. Patient received IV fluids, vancomycin and Zosyn in the emergency department.    Plan:  Continue Zosyn  Procal elevated  Follow-up cultures  Q4 vitals   Daily CBC

## 2020-02-17 NOTE — SUBJECTIVE & OBJECTIVE
Oncology Treatment Plan:   [No treatment plan]    Medications:  Continuous Infusions:  Scheduled Meds:   amitriptyline  50 mg Oral QHS    apixaban  5 mg Oral BID    calcium carbonate  1,000 mg Oral Daily    dronabinol  5 mg Oral BID AC    levETIRAcetam  500 mg Oral BID    lipase-protease-amylase 12,000-38,000-60,000 units  1 capsule Oral TID WM    LORazepam  1 mg Oral BID    multivitamin  1 tablet Oral Daily    piperacillin-tazobactam (ZOSYN) IVPB  4.5 g Intravenous Q8H    polyethylene glycol  17 g Oral Daily    senna-docusate 8.6-50 mg  1 tablet Oral Daily     PRN Meds:acetaminophen, albuterol-ipratropium, Dextrose 10% Bolus, Dextrose 10% Bolus, glucagon (human recombinant), glucose, glucose, insulin aspart U-100, ondansetron, promethazine, sodium chloride 0.9%, Pharmacy to dose Vancomycin consult **AND** vancomycin - pharmacy to dose     Review of patient's allergies indicates:   Allergen Reactions    Tobradex [tobramycin-dexamethasone] Swelling    Iodinated contrast media Hives    Latex Rash and Hives    Phenytoin sodium extended Other (See Comments) and Rash        Past Medical History:   Diagnosis Date    Anticoagulant long-term use     Blood clot in vein 06/2016    Breast cancer 1994    Cataract     Hypertension     Lung cancer     Lung cancer metastatic to brain     Pancreatic cancer 1998    Posterior capsular opacification, left eye     Psychiatric problem     Seizures 01/25/2016    Stroke     Therapy      Past Surgical History:   Procedure Laterality Date    BONE BIOSPY  3/9/16    BRAIN SURGERY  1/12/16    tumor removal 1/12/16    BREAST LUMPECTOMY  1998    left    CATARACT EXTRACTION Bilateral 2004    yag OU    CHOLECYSTECTOMY  1998    COLONOSCOPY N/A 11/13/2015    Procedure: COLONOSCOPY;  Surgeon: Alex Carmona MD;  Location: [address]);  Service: Endoscopy;  Laterality: N/A;  2 year f/u    COLONOSCOPY N/A 11/7/2018    Procedure: COLONOSCOPY;  Surgeon:  Jim Raman MD;  Location: [facility] (4TH FLR);  Service: Endoscopy;  Laterality: N/A;  Eliquis - per Dr. Vazquez -ok to hold Eliquis x 2 days prior to colon- ERW    cysto and right ureteral stent  12    ecoli      removal of blockage, done throat, then through the liver.    HYSTERECTOMY  1974    KIDNEY STONE SURGERY  --laser    left eswl  12    OOPHORECTOMY  2012    Laparoscopic BSO, lysis of adhesions, cystoscopy greater than 35mins     WHIPPLE PROCEDURE W/ LAPAROSCOPY       Family History     Problem Relation (Age of Onset)    Breast cancer Mother    Leukemia Paternal Grandfather    Lung cancer Mother    Stomach cancer Paternal Grandmother        Tobacco Use    Smoking status: Former Smoker     Packs/day: 0.00     Years: 0.00     Pack years: 0.00     Last attempt to quit: 1961     Years since quittin.4    Smokeless tobacco: Never Used   Substance and Sexual Activity    Alcohol use: No    Drug use: No    Sexual activity: Not Currently     Partners: Male     Birth control/protection: See Surgical Hx       Review of Systems   Constitutional: Positive for fever. Negative for activity change, appetite change, chills, diaphoresis and fatigue.   HENT: Negative for congestion.    Eyes: Negative for visual disturbance.   Respiratory: Positive for cough ( Nonproductive). Negative for chest tightness, shortness of breath and wheezing.    Cardiovascular: Negative for chest pain, palpitations and leg swelling.   Gastrointestinal: Negative for abdominal distention and abdominal pain.   Genitourinary: Negative for dysuria and frequency.   Musculoskeletal: Negative for myalgias.   Neurological: Negative for seizures, weakness and headaches.   Hematological: Negative for adenopathy. Bruises/bleeds easily.   Psychiatric/Behavioral: Positive for confusion. Negative for agitation and behavioral problems.     Objective:     Vital Signs (Most Recent):  Temp: 98.1 °F (36.7 °C)  (02/17/20 1115)  Pulse: 73 (02/17/20 1115)  Resp: 17 (02/17/20 1115)  BP: (!) 109/59 (02/17/20 1115)  SpO2: 96 % (02/17/20 1115) Vital Signs (24h Range):  Temp:  [98 °F (36.7 °C)-102.3 °F (39.1 °C)] 98.1 °F (36.7 °C)  Pulse:  [] 73  Resp:  [14-22] 17  SpO2:  [96 %-100 %] 96 %  BP: (107-141)/(56-78) 109/59     Weight: 66.8 kg (147 lb 4.3 oz)  Body mass index is 23.77 kg/m².  Body surface area is 1.76 meters squared.      Intake/Output Summary (Last 24 hours) at 2/17/2020 1325  Last data filed at 2/17/2020 0427  Gross per 24 hour   Intake 425 ml   Output --   Net 425 ml       Physical Exam   Constitutional: She is oriented to person, place, and time. She appears well-developed and well-nourished. No distress.   HENT:   Head: Normocephalic.   Mouth/Throat: No oropharyngeal exudate.   Eyes: Pupils are equal, round, and reactive to light. EOM are normal. No scleral icterus.   Neck: Normal range of motion. Neck supple. No JVD present.   Cardiovascular: Normal rate, regular rhythm and intact distal pulses.   Pulmonary/Chest: Effort normal and breath sounds normal. No respiratory distress.   Abdominal: Soft. Bowel sounds are normal.   Musculoskeletal: She exhibits no edema.   Neurological: She is alert and oriented to person, place, and time.   Skin: Skin is warm and dry. Capillary refill takes less than 2 seconds. No rash noted. She is not diaphoretic. No erythema.   Psychiatric: She has a normal mood and affect.       Significant Labs:   Recent Lab Results       02/17/20  0844   02/17/20  0634   02/17/20  0422   02/17/20  0335   02/17/20  0330        Influenza A, Molecular               Influenza B, Molecular               Procalcitonin 4.75  Comment:  A concentration < 0.25 ng/mL represents a low risk bacterial   infection.  Procalcitonin may not be accurate among patients with localized   infection, recent trauma or major surgery, immunosuppressed state,   invasive fungal infection, renal dysfunction. Decisions  regarding   initiation or continuation of antibiotic therapy should not be based   solely on procalcitonin levels.               Acetaminophen (Tylenol), Serum     19.0  Comment:  Toxic Levels:  Adults (4 hr post-ingestion).........>150 ug/mL  Adults (12 hr post-ingestion)........>40 ug/mL  Peds (2 hr post-ingestion, liquid)...>225 ug/mL           Albumin               Alkaline Phosphatase               ALT               Amylase     81         Anion Gap               Appearance, UA       Clear       AST               Baso #               Basophil%               Bilirubin (UA)       Negative       Bilirubin, Direct 1.5             BILIRUBIN TOTAL               Blood Culture, Routine         No Growth to date[P]     BUN, Bld               Calcium               Chloride               CO2               Color, UA       Yellow       Creatinine               Differential Method               eGFR if                eGFR if non                Eos #               Eosinophil%               Estimated Avg Glucose               Flu A & B Source               Glucose               Glucose, UA       Negative       Gran # (ANC)               Gran%               Hematocrit               Hemoglobin               Hemoglobin A1C External               Hep A IgM     Negative         Hep B C IgM     Negative         Hepatitis B Surface Ag     Negative         Hepatitis C Ab     Negative         Immature Grans (Abs)               Immature Granulocytes               INR               Ketones, UA       Negative       Lactate, Pawel   0.9  Comment:  Falsely low lactic acid results can be found in samples   containing >=13.0 mg/dL total bilirubin and/or >=3.5 mg/dL   direct bilirubin.             Leukocytes, UA       Negative       Lipase     <3         Lymph #               Lymph%               MCH               MCHC               MCV               Mono #               Mono%               MPV                NITRITE UA       Negative       nRBC               Occult Blood UA       Negative       pH, UA       5.0       Platelets               Potassium               PROTEIN TOTAL               Protein, UA       Negative  Comment:  Recommend a 24 hour urine protein or a urine   protein/creatinine ratio if globulin induced proteinuria is  clinically suspected.         Protime               RBC               RDW               Sodium               Specific [place], UA       1.005       Specimen UA       Urine, Clean Catch       WBC                                02/17/20  0317   02/17/20  0308   02/17/20  0304        Influenza A, Molecular Negative         Influenza B, Molecular Negative         Procalcitonin           Acetaminophen (Tylenol), Serum           Albumin     2.8     Alkaline Phosphatase     720     ALT     383     Amylase           Anion Gap     11     Appearance, UA           AST     845     Baso #     0.03     Basophil%     0.3     Bilirubin (UA)           Bilirubin, Direct           BILIRUBIN TOTAL     2.7  Comment:  For infants and newborns, interpretation of results should be based  on gestational age, weight and in agreement with clinical  observations.  Premature Infant recommended reference ranges:  Up to 24 hours.............<8.0 mg/dL  Up to 48 hours............<12.0 mg/dL  3-5 days..................<15.0 mg/dL  6-29 days.................<15.0 mg/dL       Blood Culture, Routine   No Growth to date[P]       BUN, Bld     29     Calcium     8.4     Chloride     110     CO2     17     Color, UA           Creatinine     1.7     Differential Method     Automated     eGFR if      33.5     eGFR if non      29.1  Comment:  Calculation used to obtain the estimated glomerular filtration  rate (eGFR) is the CKD-EPI equation.        Eos #     0.2     Eosinophil%     2.0     Estimated Avg Glucose     85     Flu A & B Source Nasal swab         Glucose     104     Glucose, UA            Gran # (ANC)     9.0     Gran%     79.9     Hematocrit     30.2     Hemoglobin     8.8     Hemoglobin A1C External     4.6  Comment:  ADA Screening Guidelines:  5.7-6.4%  Consistent with prediabetes  >or=6.5%  Consistent with diabetes  High levels of fetal hemoglobin interfere with the HbA1C  assay. Heterozygous hemoglobin variants (HbS, HgC, etc)do  not significantly interfere with this assay.   However, presence of multiple variants may affect accuracy.       Hep A IgM           Hep B C IgM           Hepatitis B Surface Ag           Hepatitis C Ab           Immature Grans (Abs)     0.06  Comment:  Mild elevation in immature granulocytes is non specific and   can be seen in a variety of conditions including stress response,   acute inflammation, trauma and pregnancy. Correlation with other   laboratory and clinical findings is essential.       Immature Granulocytes     0.5     INR     1.2  Comment:  Coumadin Therapy:  2.0 - 3.0 for INR for all indicators except mechanical heart valves  and antiphospholipid syndromes which should use 2.5 - 3.5.       Ketones, UA           Lactate, Pawel     1.1  Comment:  Falsely low lactic acid results can be found in samples   containing >=13.0 mg/dL total bilirubin and/or >=3.5 mg/dL   direct bilirubin.       Leukocytes, UA           Lipase           Lymph #     0.6     Lymph%     5.3     MCH     25.4     MCHC     29.1     MCV     87     Mono #     1.3     Mono%     12.0     MPV     12.1     NITRITE UA           nRBC     0     Occult Blood UA           pH, UA           Platelets     233     Potassium     3.9     PROTEIN TOTAL     6.1     Protein, UA           Protime     11.8     RBC     3.47     RDW     16.1     Sodium     138     Specific Gravity, UA           Specimen UA           WBC     11.20        and All pertinent labs from the last 24 hours have been reviewed.    Diagnostic Results:  I have reviewed all pertinent imaging results/findings within the past 24 hours.

## 2020-02-17 NOTE — HOSPITAL COURSE
Admitted to Medical Oncology on 02/17/2020 for sepsis after presenting to the emergency department febrile and encephalopathic.  Patient received vancomycin and Zosyn in the emergency department and was continued on Zosyn alone when she reached floor.  Goals of care discussed with patient at bedside when she arrived to the floor and she opted to be made DNR.  Discussions also started regarding hospice.  Palliative Care consulted to assist with additional discussions. Zosyn transitioned to Ciprofloxacin on 2/18 and discharged on levofloxacin on 2/19. Follow-ups arranged with palliative care and oncology.    On 2/19/2020, Patient medically stable for discharge. Discharge recommendations and any changes to patient's medication regimen discussed with the patient prior to discharge and included in the patient's discharge packet.      Physical Exam on Day of Discharge:  Vital Signs Reviewed  Gen: NAD  Pulm: CTA-B  Cardiac: RRR, no murmurs  MSK: no edema present  Neuro: AAOx3  Psych: normal behavior

## 2020-02-17 NOTE — ED TRIAGE NOTES
Naty St, a 74 y.o. female presents to the ED w/ complaint of fever    Triage note: Pt presents with a fever after waking up tonight not feeling well. Pt reports abdomial pain, n/v, and fatigue. Pt reports she was recently admitted for sepsis and she feels the same way today. Pt temp 102.3 during triage   Chief Complaint   Patient presents with    Fever     Patient reports that she woke up tonight not feeling well. States that she was recently treated for sepsis. Has history of multiple types of cancer.      Review of patient's allergies indicates:   Allergen Reactions    Tobradex [tobramycin-dexamethasone] Swelling    Iodinated contrast media Hives    Latex Rash and Hives    Phenytoin sodium extended Other (See Comments) and Rash     Past Medical History:   Diagnosis Date    Anticoagulant long-term use     Blood clot in vein 06/2016    Breast cancer 1994    Cataract     Hypertension     Lung cancer     Lung cancer metastatic to brain     Pancreatic cancer 1998    Posterior capsular opacification, left eye     Psychiatric problem     Seizures 01/25/2016    Stroke     Therapy

## 2020-02-17 NOTE — ED NOTES
Pt assisted to bedside commode.  Returned to stretcher.  No distress noted.  Pt updated-waiting on telemetry box then she will be transported to her room.  Pt states understanding.

## 2020-02-17 NOTE — ED NOTES
Pt care assumed.  Pt lying on stretcher, AAO x4.  Heme/Onc at bedside.  IV fluids infusing without difficulty.  Vancomycin infusion completed.  Pt updated on plan of care, waiting on bed assignment, states understanding.  Side rails up, call button within reach.  Will continue to monitor.

## 2020-02-17 NOTE — ASSESSMENT & PLAN NOTE
- Creatine stable at baseline  - Estimated Creatinine Clearance: 26.8 mL/min (A) (based on SCr of 1.7 mg/dL (H)).   - Monitor UOP and serial BMP and adjust therapy as needed.   - Renally dose meds.

## 2020-02-17 NOTE — TELEPHONE ENCOUNTER
----- Message from RAJESH Soria sent at 2/15/2020  1:00 PM CST -----  Thanks, Aline.    Daniela  ----- Message -----  From: Aline Shah LPN  Sent: 2/13/2020  11:39 AM CST  To: Usama Packer MD, #    Pt was told by Dr. Packer to take 3 tabs of the lasix 40 mg every Fri, Sat and Sun(PRN)  stated that she was urinating about the same as she was before starting the lasix but, today she stating that the swelling has gone down from 2 days ago

## 2020-02-17 NOTE — HPI
Ms. St is a 74 year-old female who presented to [facility] ED on 2/17 with fever and confusion. PMHx includes recent bacteremia s/p p.o. Cipro, metastatic R lung adenocarcinoma on Xgeva and Tarceva, breast ca s/p L lumpectomy, meningioma s/p resection, pancreatic ca s/p whipple, and s/p hysterectomy w/ b/l salpingo-oophorectomy, HTN.   the patient states she completed her course of ciprofloxacin without issues and has been feeling well off antimicrobial therapy for more than a week's time.  On the evening of admission, the patient awoke to use the restroom and shortly thereafter was found by her  to be disoriented and confused.  Her  noted she was febrile with a home measured temperature of 103°.  The patient's  abruptly brought her to the emergency room.  On arrival, patient reported transient mild nausea and epigastric abdominal pain. She was not noted to be experiencing chest pain, shortness of breath, dizziness, weakness, headache, eye pain, ear pain, sore throat, dysuria, hematuria.  In the emergency room, the patient was febrile with a temperature of >102°.  She was started on vancomycin and Zosyn and her fever curve subsequently decreased.  When the patient was seen by medical oncology in the emergency department (after receiving vanc and zosyn) she was no longer encephalopathic.

## 2020-02-18 LAB
ALBUMIN SERPL BCP-MCNC: 2.2 G/DL (ref 3.5–5.2)
ALP SERPL-CCNC: 481 U/L (ref 55–135)
ALT SERPL W/O P-5'-P-CCNC: 225 U/L (ref 10–44)
ANION GAP SERPL CALC-SCNC: 12 MMOL/L (ref 8–16)
AST SERPL-CCNC: 328 U/L (ref 10–40)
BACTERIA UR CULT: NO GROWTH
BASOPHILS # BLD AUTO: 0.04 K/UL (ref 0–0.2)
BASOPHILS NFR BLD: 0.4 % (ref 0–1.9)
BILIRUB SERPL-MCNC: 1.2 MG/DL (ref 0.1–1)
BUN SERPL-MCNC: 26 MG/DL (ref 8–23)
CALCIUM SERPL-MCNC: 7.9 MG/DL (ref 8.7–10.5)
CHLORIDE SERPL-SCNC: 118 MMOL/L (ref 95–110)
CO2 SERPL-SCNC: 14 MMOL/L (ref 23–29)
CREAT SERPL-MCNC: 1.7 MG/DL (ref 0.5–1.4)
DIFFERENTIAL METHOD: ABNORMAL
EOSINOPHIL # BLD AUTO: 0.9 K/UL (ref 0–0.5)
EOSINOPHIL NFR BLD: 8.1 % (ref 0–8)
ERYTHROCYTE [DISTWIDTH] IN BLOOD BY AUTOMATED COUNT: 16.2 % (ref 11.5–14.5)
EST. GFR  (AFRICAN AMERICAN): 33.5 ML/MIN/1.73 M^2
EST. GFR  (NON AFRICAN AMERICAN): 29.1 ML/MIN/1.73 M^2
GLUCOSE SERPL-MCNC: 69 MG/DL (ref 70–110)
HCT VFR BLD AUTO: 28.4 % (ref 37–48.5)
HGB BLD-MCNC: 8.8 G/DL (ref 12–16)
IMM GRANULOCYTES # BLD AUTO: 0.04 K/UL (ref 0–0.04)
IMM GRANULOCYTES NFR BLD AUTO: 0.4 % (ref 0–0.5)
LYMPHOCYTES # BLD AUTO: 1.5 K/UL (ref 1–4.8)
LYMPHOCYTES NFR BLD: 13.8 % (ref 18–48)
MAGNESIUM SERPL-MCNC: 1.6 MG/DL (ref 1.6–2.6)
MCH RBC QN AUTO: 26 PG (ref 27–31)
MCHC RBC AUTO-ENTMCNC: 31 G/DL (ref 32–36)
MCV RBC AUTO: 84 FL (ref 82–98)
MONOCYTES # BLD AUTO: 1.4 K/UL (ref 0.3–1)
MONOCYTES NFR BLD: 12.8 % (ref 4–15)
NEUTROPHILS # BLD AUTO: 6.8 K/UL (ref 1.8–7.7)
NEUTROPHILS NFR BLD: 64.5 % (ref 38–73)
NRBC BLD-RTO: 0 /100 WBC
PHOSPHATE SERPL-MCNC: 3.4 MG/DL (ref 2.7–4.5)
PLATELET # BLD AUTO: 152 K/UL (ref 150–350)
PMV BLD AUTO: 11.9 FL (ref 9.2–12.9)
POTASSIUM SERPL-SCNC: 5.1 MMOL/L (ref 3.5–5.1)
PROT SERPL-MCNC: 5.4 G/DL (ref 6–8.4)
RBC # BLD AUTO: 3.39 M/UL (ref 4–5.4)
SODIUM SERPL-SCNC: 144 MMOL/L (ref 136–145)
VANCOMYCIN SERPL-MCNC: 5.2 UG/ML
VANCOMYCIN TROUGH SERPL-MCNC: 5.2 UG/ML (ref 10–22)
WBC # BLD AUTO: 10.57 K/UL (ref 3.9–12.7)

## 2020-02-18 PROCEDURE — 80202 ASSAY OF VANCOMYCIN: CPT | Mod: HCNC

## 2020-02-18 PROCEDURE — 83735 ASSAY OF MAGNESIUM: CPT | Mod: HCNC

## 2020-02-18 PROCEDURE — 80053 COMPREHEN METABOLIC PANEL: CPT | Mod: HCNC

## 2020-02-18 PROCEDURE — 99233 SBSQ HOSP IP/OBS HIGH 50: CPT | Mod: ,,, | Performed by: INTERNAL MEDICINE

## 2020-02-18 PROCEDURE — 25000003 PHARM REV CODE 250: Mod: HCNC | Performed by: STUDENT IN AN ORGANIZED HEALTH CARE EDUCATION/TRAINING PROGRAM

## 2020-02-18 PROCEDURE — 99232 PR SUBSEQUENT HOSPITAL CARE,LEVL II: ICD-10-PCS | Mod: HCNC,,, | Performed by: EMERGENCY MEDICINE

## 2020-02-18 PROCEDURE — 85025 COMPLETE CBC W/AUTO DIFF WBC: CPT | Mod: HCNC

## 2020-02-18 PROCEDURE — 80202 ASSAY OF VANCOMYCIN: CPT | Mod: 91,HCNC

## 2020-02-18 PROCEDURE — 36415 COLL VENOUS BLD VENIPUNCTURE: CPT | Mod: HCNC

## 2020-02-18 PROCEDURE — 25000003 PHARM REV CODE 250: Mod: HCNC | Performed by: INTERNAL MEDICINE

## 2020-02-18 PROCEDURE — 97116 GAIT TRAINING THERAPY: CPT | Mod: HCNC

## 2020-02-18 PROCEDURE — 84100 ASSAY OF PHOSPHORUS: CPT | Mod: HCNC

## 2020-02-18 PROCEDURE — 20600001 HC STEP DOWN PRIVATE ROOM: Mod: HCNC

## 2020-02-18 PROCEDURE — 97161 PT EVAL LOW COMPLEX 20 MIN: CPT | Mod: HCNC

## 2020-02-18 PROCEDURE — 97165 OT EVAL LOW COMPLEX 30 MIN: CPT | Mod: HCNC

## 2020-02-18 PROCEDURE — 63600175 PHARM REV CODE 636 W HCPCS: Mod: HCNC | Performed by: STUDENT IN AN ORGANIZED HEALTH CARE EDUCATION/TRAINING PROGRAM

## 2020-02-18 PROCEDURE — 99232 SBSQ HOSP IP/OBS MODERATE 35: CPT | Mod: HCNC,,, | Performed by: EMERGENCY MEDICINE

## 2020-02-18 PROCEDURE — 99233 PR SUBSEQUENT HOSPITAL CARE,LEVL III: ICD-10-PCS | Mod: ,,, | Performed by: INTERNAL MEDICINE

## 2020-02-18 RX ORDER — CIPROFLOXACIN 250 MG/1
500 TABLET, FILM COATED ORAL EVERY 24 HOURS
Status: DISCONTINUED | OUTPATIENT
Start: 2020-02-18 | End: 2020-02-19 | Stop reason: HOSPADM

## 2020-02-18 RX ADMIN — DRONABINOL 5 MG: 2.5 CAPSULE ORAL at 03:02

## 2020-02-18 RX ADMIN — LEVETIRACETAM 500 MG: 500 TABLET ORAL at 08:02

## 2020-02-18 RX ADMIN — THERA TABS 1 TABLET: TAB at 09:02

## 2020-02-18 RX ADMIN — PANCRELIPASE 1 CAPSULE: 60000; 12000; 38000 CAPSULE, DELAYED RELEASE PELLETS ORAL at 11:02

## 2020-02-18 RX ADMIN — PANCRELIPASE 1 CAPSULE: 60000; 12000; 38000 CAPSULE, DELAYED RELEASE PELLETS ORAL at 07:02

## 2020-02-18 RX ADMIN — CIPROFLOXACIN HYDROCHLORIDE 500 MG: 250 TABLET, FILM COATED ORAL at 05:02

## 2020-02-18 RX ADMIN — CALCIUM 1000 MG: 500 TABLET ORAL at 09:02

## 2020-02-18 RX ADMIN — APIXABAN 5 MG: 5 TABLET, FILM COATED ORAL at 08:02

## 2020-02-18 RX ADMIN — LEVETIRACETAM 500 MG: 500 TABLET ORAL at 09:02

## 2020-02-18 RX ADMIN — LORAZEPAM 1 MG: 1 TABLET ORAL at 08:02

## 2020-02-18 RX ADMIN — PIPERACILLIN AND TAZOBACTAM 4.5 G: 4; .5 INJECTION, POWDER, LYOPHILIZED, FOR SOLUTION INTRAVENOUS; PARENTERAL at 03:02

## 2020-02-18 RX ADMIN — APIXABAN 5 MG: 5 TABLET, FILM COATED ORAL at 09:02

## 2020-02-18 RX ADMIN — PIPERACILLIN AND TAZOBACTAM 4.5 G: 4; .5 INJECTION, POWDER, LYOPHILIZED, FOR SOLUTION INTRAVENOUS; PARENTERAL at 11:02

## 2020-02-18 RX ADMIN — AMITRIPTYLINE HYDROCHLORIDE 50 MG: 50 TABLET, FILM COATED ORAL at 08:02

## 2020-02-18 RX ADMIN — DRONABINOL 5 MG: 2.5 CAPSULE ORAL at 05:02

## 2020-02-18 RX ADMIN — PANCRELIPASE 1 CAPSULE: 60000; 12000; 38000 CAPSULE, DELAYED RELEASE PELLETS ORAL at 05:02

## 2020-02-18 RX ADMIN — LORAZEPAM 1 MG: 1 TABLET ORAL at 09:02

## 2020-02-18 RX ADMIN — LOSARTAN POTASSIUM 50 MG: 50 TABLET ORAL at 08:02

## 2020-02-18 NOTE — PROGRESS NOTES
[facility]  Hematology/Oncology  Progress Note    Patient Name: Naty St  Admission Date: 2/17/2020  Hospital Length of Stay: 1 days  Code Status: DNR     Subjective:     HPI:  Ms. St is a 74 year-old female who presented to [facility] ED on 2/17 with fever and confusion. PMHx includes recent bacteremia s/p p.o. Cipro, metastatic R lung adenocarcinoma on Xgeva and Tarceva, breast ca s/p L lumpectomy, meningioma s/p resection, pancreatic ca s/p whipple, and s/p hysterectomy w/ b/l salpingo-oophorectomy, HTN.    the patient states she completed her course of ciprofloxacin without issues and has been feeling well off antimicrobial therapy for more than a week's time.  On the evening of admission, the patient awoke to use the restroom and shortly thereafter was found by her  to be disoriented and confused.  Her  noted she was febrile with a home measured temperature of 103°.  The patient's  abruptly brought her to the emergency room.  On arrival, patient reported transient mild nausea and epigastric abdominal pain. She was not noted to be experiencing chest pain, shortness of breath, dizziness, weakness, headache, eye pain, ear pain, sore throat, dysuria, hematuria.  In the emergency room, the patient was febrile with a temperature of >102° .  She was started on vancomycin and Zosyn and her fever curve subsequently decreased.  When the patient was seen by medical oncology in the emergency department (after receiving vanc and zosyn) she was no longer encephalopathic.      Interval History: NAEO.  Patient remains afebrile since receiving antibiotics on admission.  Vancomycin level at 5 this morning.  No further doses of vancomycin to be given clinical improvement.  Will continue Zosyn at this time and monitor patient closely.    Oncology Treatment Plan:   [No treatment plan]    Medications:  Continuous Infusions:  Scheduled Meds:   amitriptyline  50 mg Oral QHS     apixaban  5 mg Oral BID    calcium carbonate  1,000 mg Oral Daily    dronabinol  5 mg Oral BID AC    levETIRAcetam  500 mg Oral BID    lipase-protease-amylase 12,000-38,000-60,000 units  1 capsule Oral TID WM    LORazepam  1 mg Oral BID    losartan  50 mg Oral QHS    multivitamin  1 tablet Oral Daily    piperacillin-tazobactam (ZOSYN) IVPB  4.5 g Intravenous Q8H    polyethylene glycol  17 g Oral Daily    senna-docusate 8.6-50 mg  1 tablet Oral Daily     PRN Meds:acetaminophen, albuterol-ipratropium, Dextrose 10% Bolus, Dextrose 10% Bolus, glucagon (human recombinant), glucose, glucose, insulin aspart U-100, ondansetron, promethazine, sodium chloride 0.9%     Review of Systems   Constitutional: Negative for activity change, appetite change, chills, diaphoresis, fatigue and fever.   HENT: Negative for congestion.    Eyes: Negative for visual disturbance.   Respiratory: Positive for cough ( Nonproductive). Negative for chest tightness, shortness of breath and wheezing.    Cardiovascular: Negative for chest pain, palpitations and leg swelling.   Gastrointestinal: Negative for abdominal distention and abdominal pain.   Genitourinary: Negative for dysuria and frequency.   Musculoskeletal: Negative for myalgias.   Neurological: Negative for seizures, weakness and headaches.   Hematological: Negative for adenopathy. Bruises/bleeds easily.   Psychiatric/Behavioral: Negative for agitation, behavioral problems and confusion.     Objective:     Vital Signs (Most Recent):  Temp: 97.8 °F (36.6 °C) (02/18/20 0737)  Pulse: 67 (02/18/20 0737)  Resp: 17 (02/18/20 0737)  BP: 131/63 (02/18/20 0737)  SpO2: 97 % (02/18/20 0737) Vital Signs (24h Range):  Temp:  [97.7 °F (36.5 °C)-98.2 °F (36.8 °C)] 97.8 °F (36.6 °C)  Pulse:  [67-85] 67  Resp:  [16-20] 17  SpO2:  [96 %-100 %] 97 %  BP: (109-150)/(59-72) 131/63     Weight: 61.8 kg (136 lb 3.9 oz)  Body mass index is 21.99 kg/m².  Body surface area is 1.7 meters  squared.      Intake/Output Summary (Last 24 hours) at 2/18/2020 1049  Last data filed at 2/18/2020 0922  Gross per 24 hour   Intake 1080 ml   Output 2000 ml   Net -920 ml       Physical Exam   Constitutional: She is oriented to person, place, and time. She appears well-developed and well-nourished. No distress.   HENT:   Head: Normocephalic.   Mouth/Throat: No oropharyngeal exudate.   Eyes: Pupils are equal, round, and reactive to light. EOM are normal. No scleral icterus.   Neck: Normal range of motion. Neck supple. No JVD present.   Cardiovascular: Normal rate, regular rhythm and intact distal pulses.   Pulmonary/Chest: Effort normal and breath sounds normal. No respiratory distress.   Abdominal: Soft. Bowel sounds are normal.   Musculoskeletal: She exhibits no edema.   Neurological: She is alert and oriented to person, place, and time.   Skin: Skin is warm and dry. Capillary refill takes less than 2 seconds. No rash noted. She is not diaphoretic. No erythema.   Psychiatric: She has a normal mood and affect.       Significant Labs:   All pertinent labs from the last 24 hours have been reviewed.    Diagnostic Results:  I have reviewed all pertinent imaging results/findings within the past 24 hours.    Assessment/Plan:     * Severe sepsis  74F with recent bacteremia admitted with fever and encephalopathy. Patient received IV fluids, vancomycin and Zosyn in the emergency department.    Plan:  Continue Zosyn  Procal elevated  Follow-up cultures  Q4 vitals   Daily CBC    Adenocarcinoma of lung  See oncology history    Elevated LFTs  Avoid hepatotoxic drugs  Monitor with daily CMP    Secondary malignant neoplasm of intrathoracic lymph nodes  See oncology history    History of deep vein thrombosis (DVT) of lower extremity  Continue home apixaban    History of stroke  Continue home apixaban  Holding statin in the setting of transaminitis    Seizure disorder  Continue home Keppra  Continue home Ativan    CKD (chronic  kidney disease) stage 3, GFR 30-59 ml/min  - Creatine stable at baseline  - Estimated Creatinine Clearance: 26.8 mL/min (A) (based on SCr of 1.7 mg/dL (H)).   - Monitor UOP and serial BMP and adjust therapy as needed.   - Renally dose meds.        Meningioma  See oncology history    History of breast cancer  See Oncology history             Simone Boyce MD  Hematology/Oncology  [facility]    Attending attestation:  I personally reviewed patient's chart and examined patient discussed with the care team.  Overall clinically she looks good this morning.  I agree with the above note.  We are going to stop her vancomycin continue Zosyn for today and evaluate her temperature curve overnight.  I hope that she will be ready for discharge in the very near future.

## 2020-02-18 NOTE — PLAN OF CARE
Patient remained free from falls throughout shift, call bell within reach. Afebrile throughout shift. Zosyn continued. No complaints of pain or discomfort. Vitals stable, will continue to monitor.

## 2020-02-18 NOTE — PLAN OF CARE
Problem: Adult Inpatient Plan of Care  Goal: Plan of Care Review  Outcome: Ongoing, Progressing  Flowsheets (Taken 2/18/2020 7514)  Plan of Care Reviewed With: patient   Patient remains free from falls and injury this shift. Bed in low, locked position with call light in reach. Plan of care reviewed with pt.  Family at bedside. VSS ____.  Pain of ____ resolved with____. Electrolytes replaced. Diet advancement tolerated? Wound/ incision sites monitored with ____ appearance, and ___ dressing. Consults_____. Patient encouraged to call for assistance when getting out of bed.  Patient verbalized understanding. All belongings within reach.  Will continue to monitor.

## 2020-02-18 NOTE — CONSULTS
"[facility]  Palliative Medicine  Consult Note    Patient Name: Naty St  MRN: [id number]  Admission Date: 2/17/2020  Hospital Length of Stay: 0 days  Code Status: DNR   Attending Provider: RONALDO Roberts MD  Consulting Provider: Shaka Ann MD  Primary Care Physician: Olvin Mars MD  Principal Problem:Severe sepsis    Patient information was obtained from patient, past medical records and ER records.      Inpatient consult to Palliative Care  Consult performed by: Shaka Ann MD  Consult ordered by: Simone Boyce MD        Assessment/Plan:     Palliative care encounter  73 yo female with h/o multiple types of cancer in the remote past, more recently with Stage 4 NSCLC adeno of the right lung and declining performance status admitted after recent bacteremia for increased lethargy, fevers, and confusions.  1) Symptoms:denies    2) Code status: DNR as per today's discussion; verified with pt    3) Psychosocial:  to current  for over 30 years; they both have children form previous marriages    4) Medicolegal: no ACP/LW; hopes to complete before she leaves the hospital    5) Spiritual:  Sabianist;"reformed Jehovah's witness"-good family  involved; no existential distress    6) Goals of care/discussion:  I initially met the pt along with her  on the heels of a discussions with her admitting oncology team.  The pt was welcoming of additional discussions, and after some initial hesitation, the  was also engaging.      They both recognize her health has continued to decline over the past few months and it has caused them to search for meaning in her clinical course.  She feels she has already outlived her prognosis and is ready for "whatever God has in store" for her.  She defines this as being accepting of her death if she were to die tonight or in months, but is keenly aware of her desires to avoid burdensome treatment that would not help to " "provide her continued QOL she still enjoys.      She affirmed her desire to avoid unnecessary prolonging of the dying process and agrees with DNR. Her  was supportive.  We briefly discussed the role of hospice can play in people's lives when they choose to avoid hospitalization and choose to focus on a symptom based comfort care plan.  They acknowledged they have had good experiences in the past with other family members and will consider all options.    They agreed to ongoing conversations and will complete ACP and LaPOST prior to discharge,    7) Disposition plan: home with outpt follow up          Thank you for your consult. I will follow-up with patient. Please contact us if you have any additional questions.    Subjective:     HPI:   Per HPI":  Ms. St is a 74 year-old female who presented to [facility] ED on 2/17 with fever and confusion. PMHx includes recent bacteremia s/p p.o. Cipro, metastatic R lung adenocarcinoma on Xgeva and Tarceva, breast ca s/p L lumpectomy, meningioma s/p resection, pancreatic ca s/p whipple, and s/p hysterectomy w/ b/l salpingo-oophorectomy, HTN.    the patient states she completed her course of ciprofloxacin without issues and has been feeling well off antimicrobial therapy for more than a week's time.  On the evening of admission, the patient awoke to use the restroom and shortly thereafter was found by her  to be disoriented and confused.  Her  noted she was febrile with a home measured temperature of 103°.  The patient's  abruptly brought her to the emergency room.  On arrival, patient reported transient mild nausea and epigastric abdominal pain. She was not noted to be experiencing chest pain, shortness of breath, dizziness, weakness, headache, eye pain, ear pain, sore throat, dysuria, hematuria.  In the emergency room, the patient was febrile with a temperature of >102° .  She was started on vancomycin and Zosyn and her fever curve subsequently " decreased.  When the patient was seen by medical oncology in the emergency department (after receiving vanc and zosyn) she was no longer encephalopathic.    In this setting, palliative medicine was consulted to help with medical decision making and aid in the formation of goals of care.       Hospital Course:  No notes on file        Past Medical History:   Diagnosis Date    Anticoagulant long-term use     Blood clot in vein 06/2016    Breast cancer 1994    Cataract     Hypertension     Lung cancer     Lung cancer metastatic to brain     Pancreatic cancer 1998    Posterior capsular opacification, left eye     Psychiatric problem     Seizures 01/25/2016    Stroke     Therapy        Past Surgical History:   Procedure Laterality Date    BONE BIOSPY  3/9/16    BRAIN SURGERY  1/12/16    tumor removal 1/12/16    BREAST LUMPECTOMY  1998    left    CATARACT EXTRACTION Bilateral 2004    yag OU    CHOLECYSTECTOMY  1998    COLONOSCOPY N/A 11/13/2015    Procedure: COLONOSCOPY;  Surgeon: Alex Carmona MD;  [facility]  Service: Endoscopy;  Laterality: N/A;  2 year f/u    COLONOSCOPY N/A 11/7/2018    Procedure: COLONOSCOPY;  Surgeon: Jim Raman MD;  [facility]  Service: Endoscopy;  Laterality: N/A;  Eliquis - per Dr. Vazquez -ok to hold Eliquis x 2 days prior to colon- ERW    cysto and right ureteral stent  4/27/12    ecoli  2010    removal of blockage, done throat, then through the liver.    HYSTERECTOMY  1974    KIDNEY STONE SURGERY  2010--laser    left eswl  6/20/12    OOPHORECTOMY  4/25/2012    Laparoscopic BSO, lysis of adhesions, cystoscopy greater than 35mins     WHIPPLE PROCEDURE W/ LAPAROSCOPY  1998       Review of patient's allergies indicates:   Allergen Reactions    Tobradex [tobramycin-dexamethasone] Swelling    Iodinated contrast media Hives    Latex Rash and Hives    Phenytoin sodium extended Other (See Comments) and Rash        Medications:  Continuous Infusions:  Scheduled Meds:   amitriptyline  50 mg Oral QHS    apixaban  5 mg Oral BID    calcium carbonate  1,000 mg Oral Daily    dronabinol  5 mg Oral BID AC    levETIRAcetam  500 mg Oral BID    lipase-protease-amylase 12,000-38,000-60,000 units  1 capsule Oral TID WM    LORazepam  1 mg Oral BID    losartan  50 mg Oral QHS    multivitamin  1 tablet Oral Daily    piperacillin-tazobactam (ZOSYN) IVPB  4.5 g Intravenous Q8H    polyethylene glycol  17 g Oral Daily    senna-docusate 8.6-50 mg  1 tablet Oral Daily     PRN Meds:acetaminophen, albuterol-ipratropium, Dextrose 10% Bolus, Dextrose 10% Bolus, glucagon (human recombinant), glucose, glucose, insulin aspart U-100, ondansetron, promethazine, sodium chloride 0.9%, Pharmacy to dose Vancomycin consult **AND** vancomycin - pharmacy to dose    Family History     Problem Relation (Age of Onset)    Breast cancer Mother    Leukemia Paternal Grandfather    Lung cancer Mother    Stomach cancer Paternal Grandmother        Tobacco Use    Smoking status: Former Smoker     Packs/day: 0.00     Years: 0.00     Pack years: 0.00     Last attempt to quit: 1961     Years since quittin.4    Smokeless tobacco: Never Used   Substance and Sexual Activity    Alcohol use: No    Drug use: No    Sexual activity: Not Currently     Partners: Male     Birth control/protection: See Surgical Hx       Review of Systems  Objective:     Vital Signs (Most Recent):  Temp: 97.7 °F (36.5 °C) (20)  Pulse: 85 (20)  Resp: 16 (20)  BP: (!) 150/67 (20)  SpO2: 98 % (20) Vital Signs (24h Range):  Temp:  [97.7 °F (36.5 °C)-102.3 °F (39.1 °C)] 97.7 °F (36.5 °C)  Pulse:  [] 85  Resp:  [14-22] 16  SpO2:  [96 %-100 %] 98 %  BP: (107-150)/(56-78) 150/67     Weight: 66.8 kg (147 lb 4.3 oz)  Body mass index is 23.77 kg/m².    Review of Symptoms  Constitutional: Positive for fever. Negative for  activity change, appetite change, chills, diaphoresis and fatigue.   HENT: Negative for congestion.    Eyes: Negative for visual disturbance.   Respiratory: Positive for cough ( Nonproductive). Negative for chest tightness, shortness of breath and wheezing.    Cardiovascular: Negative for chest pain, palpitations and leg swelling.   Gastrointestinal: Negative for abdominal distention and abdominal pain.   Genitourinary: Negative for dysuria and frequency.   Musculoskeletal: Negative for myalgias.   Neurological: Negative for seizures, weakness and headaches.   Hematological: Negative for adenopathy. Bruises/bleeds easily.   Psychiatric/Behavioral: Positive for confusion. Negative for agitation and behavioral problems.   Symptom Assessment (ESAS 0-10 scale)   ESAS 0 1 2 3 4 5 6 7 8 9 10   Pain x             Dyspnea   x           Anxiety x             Nausea x             Depression  x             Anorexia   x           Fatigue   x           Insomnia x             Restlessness  x             Agitation x               Bowel Management Plan (BMP): Yes    Comments: none    Pain Assessment: none    OME in 24 hours: 0    Performance Status: 60    ECOG Performance Status Grade: 3 - Confined to bed or chair 50% of waking hours    Physical Exam  Constitutional: She is oriented to person, place, and time. She appears well-developed and well-nourished. No distress.   HENT:   Head: Normocephalic.   Mouth/Throat: No oropharyngeal exudate.   Eyes: Pupils are equal, round, and reactive to light. EOM are normal. No scleral icterus.   Neck: Normal range of motion. Neck supple. No JVD present.   Cardiovascular: Normal rate, regular rhythm and intact distal pulses.   Pulmonary/Chest: Effort normal and breath sounds normal. No respiratory distress.   Abdominal: Soft. Bowel sounds are normal.   Musculoskeletal: She exhibits no edema.   Neurological: She is alert and oriented to person, place, and time.   Skin: Skin is warm and dry.  Capillary refill takes less than 2 seconds. No rash noted. She is not diaphoretic. No erythema.   Psychiatric: She has a normal mood and affect.    Significant Labs:   CBC:   Recent Labs   Lab 20   WBC 11.20   HGB 8.8*   HCT 30.2*        CMP:   Recent Labs   Lab 20      K 3.9      CO2 17*      BUN 29*   CREATININE 1.7*   CALCIUM 8.4*   PROT 6.1   ALBUMIN 2.8*   BILITOT 2.7*   ALKPHOS 720*   *   *   ANIONGAP 11   EGFRNONAA 29.1*     CBC:   Recent Labs   Lab 20   WBC 11.20   HGB 8.8*   HCT 30.2*   MCV 87        BMP:  Recent Labs   Lab 20         K 3.9      CO2 17*   BUN 29*   CREATININE 1.7*   CALCIUM 8.4*     LFT:  Lab Results   Component Value Date     (H) 2020     (H) 2020    ALKPHOS 720 (H) 2020    BILITOT 2.7 (H) 2020     Albumin:   Albumin   Date Value Ref Range Status   2020 2.8 (L) 3.5 - 5.2 g/dL Final     Protein:   Total Protein   Date Value Ref Range Status   2020 6.1 6.0 - 8.4 g/dL Final     Lactic acid:   Lab Results   Component Value Date    LACTATE 0.9 2020    LACTATE 1.1 2020       Significant Imaging: I have reviewed all pertinent imaging results/findings within the past 24 hours.    Advance Care Planning   Advanced Directives::  Living Will: No  LaPOST: No  Do Not Resuscitate Status: Yes  Medical Power of : No; spouse is surrogate Troy Cross Spouse [phone]         Decision-Making Capacity: Patient answered questions, Family answered questions       Living Arrangements: Lives with spouse    Psychosocial/Cultural:  Patient's most important priorities:  Family and being at home    Patient's biggest concerns/fears:  Having her death unnecessarily prolonged    Previous death/end of life care history:  Her nephew  several months ago in an ICU; she knows she doesn't want that    Patient's  goals/hopes:  Good QOL and minimizing suffering    Spiritual:     F- Amairani and Belief: Violet    I - Importance: yes  .  C - Community: very much so    A - Address in Care: family  closely invovled      > 50% of75 min visit spent in chart review, face to face discussion of goals of care,  symptom assessment, coordination of care and emotional support.    Shaka Ann MD  Palliative Medicine  [facility]

## 2020-02-18 NOTE — PROGRESS NOTES
Therapy with vanc complete and/or consult discontinued by provider.  Pharmacy will sign off, please re-consult as needed.    Thanks for the consult  Redd Fernandez PharmD, [facility]  Clinical Pharmacist  Spectra Link: [phone]

## 2020-02-18 NOTE — SUBJECTIVE & OBJECTIVE
Interval History: NAEO.  Patient remains afebrile since receiving antibiotics on admission.  Vancomycin level at 5 this morning.  No further doses of vancomycin to be given clinical improvement.  Will continue Zosyn at this time and monitor patient closely.    Oncology Treatment Plan:   [No treatment plan]    Medications:  Continuous Infusions:  Scheduled Meds:   amitriptyline  50 mg Oral QHS    apixaban  5 mg Oral BID    calcium carbonate  1,000 mg Oral Daily    dronabinol  5 mg Oral BID AC    levETIRAcetam  500 mg Oral BID    lipase-protease-amylase 12,000-38,000-60,000 units  1 capsule Oral TID WM    LORazepam  1 mg Oral BID    losartan  50 mg Oral QHS    multivitamin  1 tablet Oral Daily    piperacillin-tazobactam (ZOSYN) IVPB  4.5 g Intravenous Q8H    polyethylene glycol  17 g Oral Daily    senna-docusate 8.6-50 mg  1 tablet Oral Daily     PRN Meds:acetaminophen, albuterol-ipratropium, Dextrose 10% Bolus, Dextrose 10% Bolus, glucagon (human recombinant), glucose, glucose, insulin aspart U-100, ondansetron, promethazine, sodium chloride 0.9%     Review of Systems   Constitutional: Negative for activity change, appetite change, chills, diaphoresis, fatigue and fever.   HENT: Negative for congestion.    Eyes: Negative for visual disturbance.   Respiratory: Positive for cough ( Nonproductive). Negative for chest tightness, shortness of breath and wheezing.    Cardiovascular: Negative for chest pain, palpitations and leg swelling.   Gastrointestinal: Negative for abdominal distention and abdominal pain.   Genitourinary: Negative for dysuria and frequency.   Musculoskeletal: Negative for myalgias.   Neurological: Negative for seizures, weakness and headaches.   Hematological: Negative for adenopathy. Bruises/bleeds easily.   Psychiatric/Behavioral: Negative for agitation, behavioral problems and confusion.     Objective:     Vital Signs (Most Recent):  Temp: 97.8 °F (36.6 °C) (02/18/20 0737)  Pulse: 67  (02/18/20 0737)  Resp: 17 (02/18/20 0737)  BP: 131/63 (02/18/20 0737)  SpO2: 97 % (02/18/20 0737) Vital Signs (24h Range):  Temp:  [97.7 °F (36.5 °C)-98.2 °F (36.8 °C)] 97.8 °F (36.6 °C)  Pulse:  [67-85] 67  Resp:  [16-20] 17  SpO2:  [96 %-100 %] 97 %  BP: (109-150)/(59-72) 131/63     Weight: 61.8 kg (136 lb 3.9 oz)  Body mass index is 21.99 kg/m².  Body surface area is 1.7 meters squared.      Intake/Output Summary (Last 24 hours) at 2/18/2020 1049  Last data filed at 2/18/2020 0922  Gross per 24 hour   Intake 1080 ml   Output 2000 ml   Net -920 ml       Physical Exam   Constitutional: She is oriented to person, place, and time. She appears well-developed and well-nourished. No distress.   HENT:   Head: Normocephalic.   Mouth/Throat: No oropharyngeal exudate.   Eyes: Pupils are equal, round, and reactive to light. EOM are normal. No scleral icterus.   Neck: Normal range of motion. Neck supple. No JVD present.   Cardiovascular: Normal rate, regular rhythm and intact distal pulses.   Pulmonary/Chest: Effort normal and breath sounds normal. No respiratory distress.   Abdominal: Soft. Bowel sounds are normal.   Musculoskeletal: She exhibits no edema.   Neurological: She is alert and oriented to person, place, and time.   Skin: Skin is warm and dry. Capillary refill takes less than 2 seconds. No rash noted. She is not diaphoretic. No erythema.   Psychiatric: She has a normal mood and affect.       Significant Labs:   All pertinent labs from the last 24 hours have been reviewed.    Diagnostic Results:  I have reviewed all pertinent imaging results/findings within the past 24 hours.

## 2020-02-18 NOTE — PLAN OF CARE
Problem: Physical Therapy Goal  Goal: Physical Therapy Goal  Description  Goals to be met by: 3/3/2020     Patient will increase functional independence with mobility by performin. Supine to sit with Set-up [place]  2. Sit to supine with Set-up [place]  3. Sit to stand transfer with Supervision  4. Bed to chair transfer with Supervision using Rolling Walker  5. Gait  x 200 feet with Supervision using Rolling Walker.   6. Ascend/descend 8 stairs with Handrail Contact Guard Assistance.   7. Lower extremity exercise program x15 reps per handout, with supervision     Outcome: Ongoing, Progressing     Goals set

## 2020-02-18 NOTE — PROGRESS NOTES
Subjective:       Patient ID: Naty St is a 74 y.o. Black or  female who presents for re-evaluation of progression of Chronic Kidney Disease    HPI Ms. St is a 74 year old woman medical history of hypertension, metastatic R lung adenocarcinoma on Xgeva and Tarceva, breast ca s/p L lumpectomy, meningioma s/p resection, pancreatic ca s/p whipple, and s/p hysterectomy w/ b/l salpingo-oophorectomy, nephrolithiasis requiring stent in 2012 presenting for follow up of chronic kidney disease.  Patient last seen in Nephrology clinic by Dr. Bazzi May 2018, this is her first visit with me.  Patient creatinine has been relatively stable since April 2019 (has been 1.6-1.9mg/dL), elevated since admission January 2017 (occasionally down to 1.2-1.4gmg/dL).  She reports adequate fluid intake, denies any NSAID use.  She reports blood pressure usually well-controlled.  She recently was admitted for pneumonia, completed course of antibiotics.  She reports occasional lower extremity swelling, otherwise denies any fever, chest pain, shortness of breath, abdominal pain, diarrhea, dysuria/hematuria.     Review of Systems   Constitutional: Negative for appetite change, fatigue and fever.   Respiratory: Negative for chest tightness and shortness of breath.    Cardiovascular: Positive for leg swelling. Negative for chest pain.   Gastrointestinal: Negative for abdominal pain, constipation, diarrhea, nausea and vomiting.   Genitourinary: Negative for difficulty urinating, dysuria, flank pain, frequency, hematuria and urgency.   Musculoskeletal: Negative for arthralgias, joint swelling and myalgias.   Skin: Negative for rash and wound.   Neurological: Negative for dizziness, weakness and light-headedness.   All other systems reviewed and are negative.      Objective:      Physical Exam   Constitutional: She appears well-developed and well-nourished.   Cardiovascular: Normal rate, regular rhythm and normal  heart sounds. Exam reveals no gallop and no friction rub.   No murmur heard.  Pulmonary/Chest: Effort normal and breath sounds normal. No respiratory distress. She has no wheezes. She has no rales.   Abdominal: Soft. Bowel sounds are normal. There is no tenderness.   Musculoskeletal: She exhibits edema (trace bilateral lower extremity).   Neurological: She is alert.   Skin: Skin is warm and dry. No rash noted. No erythema.   Psychiatric: She has a normal mood and affect.   Vitals reviewed.      Assessment:       1. CKD (chronic kidney disease) stage 3, GFR 30-59 ml/min        Plan:     Ms. St is a 74 year old woman medical history of hypertension, metastatic R lung adenocarcinoma on Xgeva and Tarceva, breast ca s/p L lumpectomy, meningioma s/p resection, pancreatic ca s/p whipple, and s/p hysterectomy w/ b/l salpingo-oophorectomy, nephrolithiasis requiring stent in 2012 presenting for follow up of chronic kidney disease stage IIIb.  Suspect CKD stage IIIb due to age-related renal nephron loss, along with atherosclerotic/hypertensive disease, in addition to concurrent erlotinib (Tarceva) use.   Patient creatinine has been relatively stable since April 2019 (has been 1.6-1.9mg/dL), elevated since admission January 2017 (occasionally down to 1.2-1.4gmg/dL), will continue to trend (along with proteinuria, which has been negligible on ARB).  Stressed importance of blood pressure control to prevent any further progression of kidney disease, patient voiced understanding.  Further recommendations pending results of above.    - Anemia: Hgb below goal, management of erythropoetin therapy per Hematology/Oncology given extensive history  - Bone/mineral metabolism: patient with secondary hyperparathyroidism, will trend PTH/VitD  - Nephrolithiasis: encouraged fluid intake and sodium restriction  - Edema: will give furosemide prn    Return to clinic in 4-6 months with renal/heme panel, iron/TIBC/ferritin, urinalysis/culture,  urine protein/creatinine ratio, PTH/VitD prior to next visit

## 2020-02-18 NOTE — PROGRESS NOTES
Admit Assessment    Patient Identification  Naty St   :  1945  Admit Date:  2020  Attending Provider:  RONALDO Roberts MD              Referral:   Patient was admitted to Medical Oncology Service with a diagnosis of Stage IV NSCLC, Adenocarcinoma of the Right Lung, and she was admitted this hospital stay due to Fever, Confusion, Severe Sepsis.  Tachycardia [R00.0]  Sepsis [A41.9]  Adenocarcinoma of lung, unspecified laterality [C34.90].   Additional oncologic and medical history noted in H&P, in chart.    Oncology Social Worker is involved. Patient is known to this Oncology Social Worker from previous hospital stays and from the clinic. Patient was referred to the Social Work Department via admission list/census. Patient presents as a 74 y.o. year old, ,  female.    Persons interviewed: Met with patient in her room; she was sitting up in the chair at the bedside and her nurse was preparing to give her medications. Patient was alert, oriented, cooperative, and pleasant.    Living Situation:  Lives with:  Troy St ([phone] cell). They have been  30+ years.     Resides at [address] [place] [address].  Phone: [phone] (home), [phone] (patient's cell).     Patient has three sons and one daughter from a previous marriage. Three of her children reside locally and one is out-of-state.  Her  Troy also has children from his previous marriage.    Alternate emergency contact:  Son, Basil Lopez, [phone].    Patient was previously ambulatory and able to perform ADLs, with use of DME and assistance from family as needed. Patient no longer drives and her family provides car transportation for her.     (RETIRED) Functional Status Prior  Ambulation Prior: 0-->independent  Transferrin-->independent  Toiletin-->independent    Current or Past Agencies and Description of Services/Supplies    DME  Straight  cane, rolling walker, 3-in-1 commode, shower/bath chair, nebulizer machine.    Home Health  Agency Name: [facility]  Agency Phone Number: [phone]  Services: Skilled nursing, aide, PT, OT services were ordered at patient's last discharge from the hospital 1/21/20. Patient no longer receiving services, and states that she doesn't need them.    IV Infusion  None.    Nutrition: Oral diet.    Outpatient Pharmacy:     [address]  [address] [facility] [address]  Phone: [phone] Fax: [phone]    [facility]  [address] [facility] [address]  Phone: [phone] Fax: [phone]    [facility]  [address] [facility] [address]  Phone: [phone] Fax: [phone]    [facility] [place] [address]  Phone: [phone] Fax: [phone]      Patient Preference of agencies include: patient has used [facility] agencies.    Patient/Caregiver informed of right to choose providers or agencies.  Patient provides permission to release any necessary information to [facility] and to Non-[facility] agencies as needed to facilitate patient care, treatment planning, and patient discharge planning.  Written and verbal resources will be provided as needed/requested.      Coping  Appropriate to situation.  Patient has good family support.  [employer] ligia - Religious.          Adjustment to Diagnosis and Treatment  Appropriate; ongoing process.      Emotional/Behavioral/Cognitive Issues  Patient has some word finding problems since she had a stroke in the past.    None other noted/observed.          History/Current Symptoms of Anxiety/Depression: Yes - see H&P.     History/Current Substance Use:   Social History     Tobacco Use    Smoking status: Former Smoker     Packs/day: 0.00     Years: 0.00     Pack years: 0.00     Last attempt to quit: 9/26/1961     Years since  quittin.4    Smokeless tobacco: Never Used   Substance and Sexual Activity    Alcohol use: No    Drug use: No    Sexual activity: Not Currently     Partners: Male     Birth control/protection: See Surgical Hx       Indications of Abuse/Neglect: No  Abuse Screen (yes response referral indicated)  Feels Unsafe at Home or Work/School: no    Financial:  Payor/Plan Subscr  Sex Relation Sub. Ins. ID Effective Group Num   1. [employer] MANAGE* JANE HAYES ROSSY* 1945 Female  [id number]                                   [address] [address]      Patient has [employer] Medicare HMO medical insurance.                 Other identified concerns/needs: Patient didn't report any at present.     Plan: Patient to return home with her family via car.     Interventions/Referrals: None yet this hospital stay.    Patient/caregiver engaged in treatment planning process.    Oncology Social Worker providing psychosocial and supportive counseling, resources, education, assistance and discharge planning as appropriate.  Patient/caregiver state understanding of  available resources,  following, remains available. They have contact information for Oncology Social Worker.     Will continue to follow.

## 2020-02-18 NOTE — ASSESSMENT & PLAN NOTE
"75 yo female with h/o multiple types of cancer in the remote past, more recently with Stage 4 NSCLC adeno of the right lung and declining performance status admitted after recent bacteremia for increased lethargy, fevers, and confusions.  1) Symptoms:denies    2) Code status: DNR as per today's discussion; verified with pt    3) Psychosocial:  to current  for over 30 years; they both have children form previous marriages    4) Medicolegal: no ACP/LW; hopes to complete before she leaves the hospital    5) Spiritual:  Uatsdin;"reformed Latter-day"-good family  involved; no existential distress    6) Goals of care/discussion:  I initially met the pt along with her  on the heels of a discussions with her admitting oncology team.  The pt was welcoming of additional discussions, and after some initial hesitation, the  was also engaging.      They both recognize her health has continued to decline over the past few months and it has caused them to search for meaning in her clinical course.  She feels she has already outlived her prognosis and is ready for "whatever God has in store" for her.  She defines this as being accepting of her death if she were to die tonight or in months, but is keenly aware of her desires to avoid burdensome treatment that would not help to provide her continued QOL she still enjoys.      She affirmed her desire to avoid unnecessary prolonging of the dying process and agrees with DNR. Her  was supportive.  We briefly discussed the role of hospice can play in people's lives when they choose to avoid hospitalization and choose to focus on a symptom based comfort care plan.  They acknowledged they have had good experiences in the past with other family members and will consider all options.    They agreed to ongoing conversations and will complete ACP and LaPOST prior to discharge,    7) Disposition plan: home with outpt follow up    "

## 2020-02-18 NOTE — PT/OT/SLP EVAL
Physical Therapy Evaluation    Patient Name:  Naty St   MRN:  [id number]    Recommendations:     Discharge Recommendations:  home   Discharge Equipment Recommendations: none   Barriers to discharge: 8 DENNY with (B) handrails    Assessment:     Naty St is a 74 y.o. female admitted with a medical diagnosis of Severe sepsis.  She presents with the following impairments/functional limitations:  weakness, impaired endurance, impaired functional mobilty, gait instability, impaired balance, impaired self care skills, decreased safety awareness Pt. cooperative and tolerated treatment well, except had LOB posteriorly in bathroom requiring assist to regain. Unable to practice stairs on this date.    Rehab Prognosis: Good; patient would benefit from acute skilled PT services to address these deficits and reach maximum level of function.    Recent Surgery: * No surgery found *      Plan:     During this hospitalization, patient to be seen 3 x/week to address the identified rehab impairments via gait training, therapeutic activities, therapeutic exercises and progress toward the following goals:    · Plan of Care Expires:  03/19/20    Subjective     Chief Complaint: weakness  Patient/Family Comments/goals: to go home  Pain/Comfort:  · Pain Rating 1: 0/10  · Pain Rating Post-Intervention 1: 0/10    Patients cultural, spiritual, Shinto conflicts given the current situation: no    Living Environment:  Pt. Lives with spouse in [facility] with 8 DENNY and (B) handrails.  Prior to admission, patients level of function was amb. with/without AD.  Equipment used at home: bedside commode, walker, rolling, shower chair, cane, straight.  Upon discharge, patient will have assistance from spouse.    Objective:     Communicated with nursing prior to session.  Patient found supine with peripheral IV  upon PT entry to room.    General Precautions: Standard, fall   Orthopedic Precautions:N/A   Braces:       Exams:  · RLE  ROM: WFL  · RLE Strength: WFL  · LLE ROM: WFL  · LLE Strength: WFL    Functional Mobility:  · Bed Mobility:     · Rolling Right: stand by assistance  · Scooting: stand by assistance  · Supine to Sit: stand by assistance  · Transfers:     · Sit to Stand:  stand by assistance with rolling walker  · Gait: 20' and ~400' with RW and CGA. Pt. had seated rest break between gait trials.   · Balance: fair->poor standing due to LOB standing at sink      Therapeutic Activities and Exercises:   Assisted pt. To/from bathroom. Pt. Performed ADLs with OT. Afterwards pt. amb. in hallway before returning to bedside chair    AM-PAC 6 CLICK MOBILITY  Total Score:20     Patient left up in chair with all lines intact and call button in reach.    GOALS:   Multidisciplinary Problems     Physical Therapy Goals        Problem: Physical Therapy Goal    Goal Priority Disciplines Outcome Goal Variances Interventions   Physical Therapy Goal     PT, PT/OT Ongoing, Progressing     Description:  Goals to be met by: 3/3/2020     Patient will increase functional independence with mobility by performin. Supine to sit with Set-up [place]  2. Sit to supine with Set-up [place]  3. Sit to stand transfer with Supervision  4. Bed to chair transfer with Supervision using Rolling Walker  5. Gait  x 200 feet with Supervision using Rolling Walker.   6. Ascend/descend 8 stairs with Handrail Contact Guard Assistance.   7. Lower extremity exercise program x15 reps per handout, with supervision                      History:     Past Medical History:   Diagnosis Date    Anticoagulant long-term use     Blood clot in vein 2016    Breast cancer     Cataract     Hypertension     Lung cancer     Lung cancer metastatic to brain     Pancreatic cancer     Posterior capsular opacification, left eye     Psychiatric problem     Seizures 2016    Stroke     Therapy        Past Surgical History:   Procedure Laterality Date    BONE  BIOSPY  3/9/16    BRAIN SURGERY  1/12/16    tumor removal 1/12/16    BREAST LUMPECTOMY  1998    left    CATARACT EXTRACTION Bilateral 2004    yag OU    CHOLECYSTECTOMY  1998    COLONOSCOPY N/A 11/13/2015    Procedure: COLONOSCOPY;  Surgeon: Alex Carmona MD;  Location: [facility] ([address] [place] [address]);  Service: Endoscopy;  Laterality: N/A;  2 year f/u    COLONOSCOPY N/A 11/7/2018    Procedure: COLONOSCOPY;  Surgeon: Jim Raman MD;  Location: [facility] ([address] [place] [address]);  Service: Endoscopy;  Laterality: N/A;  Eliquis - per Dr. Vazquez -ok to hold Eliquis x 2 days prior to colon- ERW    cysto and right ureteral stent  4/27/12    ecoli  2010    removal of blockage, done throat, then through the liver.    HYSTERECTOMY  1974    KIDNEY STONE SURGERY  2010--laser    left eswl  6/20/12    OOPHORECTOMY  4/25/2012    Laparoscopic BSO, lysis of adhesions, cystoscopy greater than 35mins     WHIPPLE PROCEDURE W/ LAPAROSCOPY  1998       Time Tracking:     PT Received On: 02/18/20  PT Start Time: 1058     PT Stop Time: 1118  PT Total Time (min): 20 min     Billable Minutes: Evaluation 12 and Gait Training 8      Ruddy Levine, PT  02/18/2020

## 2020-02-18 NOTE — PLAN OF CARE
Problem: Occupational Therapy Goal  Goal: Occupational Therapy Goal  Description  Goals to be met by: 2/25/2020     Patient will increase functional independence with ADLs by performing:    UE Dressing with Modified [place].  LE Dressing with Modified [place].  Grooming while standing at sink with Supervision.  Toileting from toilet with Supervision for hygiene and clothing management.   Supine to sit with Modified [place].  Toilet transfer to toilet with Supervision.     Outcome: Ongoing, Progressing   I certify that I was present in the room directing the student in service delivery and guiding them using my skilled judgment. As the co-signing therapist I have reviewed the students documentation and am responsible for the treatment, assessment, and plan.

## 2020-02-18 NOTE — HPI
"Per HPI":  Ms. St is a 74 year-old female who presented to [facility] ED on 2/17 with fever and confusion. PMHx includes recent bacteremia s/p p.o. Cipro, metastatic R lung adenocarcinoma on Xgeva and Tarceva, breast ca s/p L lumpectomy, meningioma s/p resection, pancreatic ca s/p whipple, and s/p hysterectomy w/ b/l salpingo-oophorectomy, HTN.    the patient states she completed her course of ciprofloxacin without issues and has been feeling well off antimicrobial therapy for more than a week's time.  On the evening of admission, the patient awoke to use the restroom and shortly thereafter was found by her  to be disoriented and confused.  Her  noted she was febrile with a home measured temperature of 103°.  The patient's  abruptly brought her to the emergency room.  On arrival, patient reported transient mild nausea and epigastric abdominal pain. She was not noted to be experiencing chest pain, shortness of breath, dizziness, weakness, headache, eye pain, ear pain, sore throat, dysuria, hematuria.  In the emergency room, the patient was febrile with a temperature of >102° .  She was started on vancomycin and Zosyn and her fever curve subsequently decreased.  When the patient was seen by medical oncology in the emergency department (after receiving vanc and zosyn) she was no longer encephalopathic.    In this setting, palliative medicine was consulted to help with medical decision making and aid in the formation of goals of care.     "

## 2020-02-18 NOTE — SUBJECTIVE & OBJECTIVE
Past Medical History:   Diagnosis Date    Anticoagulant long-term use     Blood clot in vein 06/2016    Breast cancer 1994    Cataract     Hypertension     Lung cancer     Lung cancer metastatic to brain     Pancreatic cancer 1998    Posterior capsular opacification, left eye     Psychiatric problem     Seizures 01/25/2016    Stroke     Therapy        Past Surgical History:   Procedure Laterality Date    BONE BIOSPY  3/9/16    BRAIN SURGERY  1/12/16    tumor removal 1/12/16    BREAST LUMPECTOMY  1998    left    CATARACT EXTRACTION Bilateral 2004    yag OU    CHOLECYSTECTOMY  1998    COLONOSCOPY N/A 11/13/2015    Procedure: COLONOSCOPY;  Surgeon: Alex Carmona MD;  [facility]  Service: Endoscopy;  Laterality: N/A;  2 year f/u    COLONOSCOPY N/A 11/7/2018    Procedure: COLONOSCOPY;  Surgeon: Jim Raman MD;  [facility]  Service: Endoscopy;  Laterality: N/A;  Eliquis - per Dr. George santiago to hold Eliquis x 2 days prior to colon- ERW    cysto and right ureteral stent  4/27/12    ecoli  2010    removal of blockage, done throat, then through the liver.    HYSTERECTOMY  1974    KIDNEY STONE SURGERY  2010--laser    left eswl  6/20/12    OOPHORECTOMY  4/25/2012    Laparoscopic BSO, lysis of adhesions, cystoscopy greater than 35mins     WHIPPLE PROCEDURE W/ LAPAROSCOPY  1998       Review of patient's allergies indicates:   Allergen Reactions    Tobradex [tobramycin-dexamethasone] Swelling    Iodinated contrast media Hives    Latex Rash and Hives    Phenytoin sodium extended Other (See Comments) and Rash       Medications:  Continuous Infusions:  Scheduled Meds:   amitriptyline  50 mg Oral QHS    apixaban  5 mg Oral BID    calcium carbonate  1,000 mg Oral Daily    dronabinol  5 mg Oral BID AC    levETIRAcetam  500 mg Oral BID    lipase-protease-amylase 12,000-38,000-60,000 units  1 capsule Oral TID WM    LORazepam  1 mg Oral BID     losartan  50 mg Oral QHS    multivitamin  1 tablet Oral Daily    piperacillin-tazobactam (ZOSYN) IVPB  4.5 g Intravenous Q8H    polyethylene glycol  17 g Oral Daily    senna-docusate 8.6-50 mg  1 tablet Oral Daily     PRN Meds:acetaminophen, albuterol-ipratropium, Dextrose 10% Bolus, Dextrose 10% Bolus, glucagon (human recombinant), glucose, glucose, insulin aspart U-100, ondansetron, promethazine, sodium chloride 0.9%, Pharmacy to dose Vancomycin consult **AND** vancomycin - pharmacy to dose    Family History     Problem Relation (Age of Onset)    Breast cancer Mother    Leukemia Paternal Grandfather    Lung cancer Mother    Stomach cancer Paternal Grandmother        Tobacco Use    Smoking status: Former Smoker     Packs/day: 0.00     Years: 0.00     Pack years: 0.00     Last attempt to quit: 1961     Years since quittin.4    Smokeless tobacco: Never Used   Substance and Sexual Activity    Alcohol use: No    Drug use: No    Sexual activity: Not Currently     Partners: Male     Birth control/protection: See Surgical Hx       Review of Systems  Objective:     Vital Signs (Most Recent):  Temp: 97.7 °F (36.5 °C) (20)  Pulse: 85 (20)  Resp: 16 (20)  BP: (!) 150/67 (20)  SpO2: 98 % (20) Vital Signs (24h Range):  Temp:  [97.7 °F (36.5 °C)-102.3 °F (39.1 °C)] 97.7 °F (36.5 °C)  Pulse:  [] 85  Resp:  [14-22] 16  SpO2:  [96 %-100 %] 98 %  BP: (107-150)/(56-78) 150/67     Weight: 66.8 kg (147 lb 4.3 oz)  Body mass index is 23.77 kg/m².    Review of Symptoms  Constitutional: Positive for fever. Negative for activity change, appetite change, chills, diaphoresis and fatigue.   HENT: Negative for congestion.    Eyes: Negative for visual disturbance.   Respiratory: Positive for cough ( Nonproductive). Negative for chest tightness, shortness of breath and wheezing.    Cardiovascular: Negative for chest pain, palpitations and leg swelling.    Gastrointestinal: Negative for abdominal distention and abdominal pain.   Genitourinary: Negative for dysuria and frequency.   Musculoskeletal: Negative for myalgias.   Neurological: Negative for seizures, weakness and headaches.   Hematological: Negative for adenopathy. Bruises/bleeds easily.   Psychiatric/Behavioral: Positive for confusion. Negative for agitation and behavioral problems.   Symptom Assessment (ESAS 0-10 scale)   ESAS 0 1 2 3 4 5 6 7 8 9 10   Pain x             Dyspnea   x           Anxiety x             Nausea x             Depression  x             Anorexia   x           Fatigue   x           Insomnia x             Restlessness  x             Agitation x               Bowel Management Plan (BMP): Yes    Comments: none    Pain Assessment: none    OME in 24 hours: 0    Performance Status: 60    ECOG Performance Status Grade: 3 - Confined to bed or chair 50% of waking hours    Physical Exam  Constitutional: She is oriented to person, place, and time. She appears well-developed and well-nourished. No distress.   HENT:   Head: Normocephalic.   Mouth/Throat: No oropharyngeal exudate.   Eyes: Pupils are equal, round, and reactive to light. EOM are normal. No scleral icterus.   Neck: Normal range of motion. Neck supple. No JVD present.   Cardiovascular: Normal rate, regular rhythm and intact distal pulses.   Pulmonary/Chest: Effort normal and breath sounds normal. No respiratory distress.   Abdominal: Soft. Bowel sounds are normal.   Musculoskeletal: She exhibits no edema.   Neurological: She is alert and oriented to person, place, and time.   Skin: Skin is warm and dry. Capillary refill takes less than 2 seconds. No rash noted. She is not diaphoretic. No erythema.   Psychiatric: She has a normal mood and affect.    Significant Labs:   CBC:   Recent Labs   Lab 02/17/20  0304   WBC 11.20   HGB 8.8*   HCT 30.2*        CMP:   Recent Labs   Lab 02/17/20  0304      K 3.9      CO2 17*   GLU  104   BUN 29*   CREATININE 1.7*   CALCIUM 8.4*   PROT 6.1   ALBUMIN 2.8*   BILITOT 2.7*   ALKPHOS 720*   *   *   ANIONGAP 11   EGFRNONAA 29.1*     CBC:   Recent Labs   Lab 20  0304   WBC 11.20   HGB 8.8*   HCT 30.2*   MCV 87        BMP:  Recent Labs   Lab 20  0304         K 3.9      CO2 17*   BUN 29*   CREATININE 1.7*   CALCIUM 8.4*     LFT:  Lab Results   Component Value Date     (H) 2020     (H) 2020    ALKPHOS 720 (H) 2020    BILITOT 2.7 (H) 2020     Albumin:   Albumin   Date Value Ref Range Status   2020 2.8 (L) 3.5 - 5.2 g/dL Final     Protein:   Total Protein   Date Value Ref Range Status   2020 6.1 6.0 - 8.4 g/dL Final     Lactic acid:   Lab Results   Component Value Date    LACTATE 0.9 2020    LACTATE 1.1 2020       Significant Imaging: I have reviewed all pertinent imaging results/findings within the past 24 hours.    Advance Care Planning   Advanced Directives::  Living Will: No  LaPOST: No  Do Not Resuscitate Status: Yes  Medical Power of : No; spouse is surrogate Troy Cross Spouse [phone]         Decision-Making Capacity: Patient answered questions, Family answered questions       Living Arrangements: Lives with spouse    Psychosocial/Cultural:  Patient's most important priorities:  Family and being at home    Patient's biggest concerns/fears:  Having her death unnecessarily prolonged    Previous death/end of life care history:  Her nephew  several months ago in an ICU; she knows she doesn't want that    Patient's goals/hopes:  Good QOL and minimizing suffering    Spiritual:     F- Amairani and Belief: Violet    I - Importance: yes  .  C - Community: very much so    A - Address in Care: family  closely invovled

## 2020-02-18 NOTE — PT/OT/SLP EVAL
"Occupational Therapy   Evaluation    Name: Naty St  MRN: [id number]  Admitting Diagnosis:  Severe sepsis      Recommendations:     Discharge Recommendations: home  Discharge Equipment Recommendations:  none  Barriers to discharge:  None(lives w/ supportive  )    Assessment:     Naty St is a 74 y.o. female with a medical diagnosis of Severe sepsis. Performance deficits affecting function: weakness, impaired endurance, impaired self care skills, impaired functional mobilty, gait instability, impaired balance, decreased safety awareness.      With min encouragement, pt was willing to participate in evaluation. Pt w/ reports of experiencing fatigue and wanting to rest. Pt agreed to ambulate from EOB to sink w/ CGA and no AD to perform oral care. While standing at sink, pt experienced LOB and required Min A to regain balance and stability; recommended use of walker to ambulate to recliner chair secondary to balance deficits. Pt tolerated tx well without incident but will continue to benefit from OT intervention to increase balance and strength for indep in performing ADLs while standing and functional mobility.     Rehab Prognosis: Good; patient would benefit from acute skilled OT services to address these deficits and reach maximum level of function.       Plan:     Patient to be seen 2 x/week to address the above listed problems via self-care/home management, therapeutic activities  · Plan of Care Expires:    · Plan of Care Reviewed with: patient    Subjective     Chief Complaint: fatique;  "I really would like to rest"  Patient/Family Comments/goals: return to PLOF    Occupational Profile:  Living Environment: Pt lives w/ spouse in [facility] w/ 8 steps to enter w/ BHR. Pt had tub/shower combo w/ SC. Pt has standard toilet w/ BSC available. Pt has access to both cane (unspecified) and RW available for ambulation.   Previous level of function: Mod I w/ most ADLs  Roles and Routines: none " stated   Equipment Used at Home:  walker, rolling, bedside commode, shower chair(cane (but did not specify))  Assistance upon Discharge: none; supportive  at home     Pain/Comfort:  · Pain Rating 1: 0/10  · Pain Rating Post-Intervention 1: 0/10    Patients cultural, spiritual, Gnosticist conflicts given the current situation:      Objective:     Communicated with: nurse prior to session.  Patient found supine with peripheral IV upon OT entry to room.    General Precautions: Standard,     Orthopedic Precautions:N/A   Braces: N/A     Occupational Performance:    Bed Mobility:    · Patient completed Rolling/Turning to Right with stand by assistance  · Patient completed Scooting/Bridging with stand by assistance  · Patient completed Supine to Sit with stand by assistance    Functional Mobility/Transfers:  · Patient completed Sit <> Stand Transfer with contact guard assistance  with  no assistive device   · Patient completed Bed <> Chair Transfer using Step Transfer technique with contact guard assistance with rolling walker  · Functional Mobility: Pt ambulated from EOB to sink w/ CGA and no AD to perform oral care at a distance of 10 ft. Pt noted w./ LOB when standing and used RW to ambulate to recliner chair at a distance of 10 ft; no LOB noted w/ use of RW.     Activities of Daily Living:  · Grooming: contact guard assistance for balance when standing to perform oral care; LOB noted and pt required HHA to regain balance and stability     Cognitive/Visual Perceptual:  Cognitive/Psychosocial Skills:     -       Oriented to: Person, Place, Time and Situation   -       Follows Commands/attention:Follows multistep  commands  -       Communication: clear/fluent  -       Mood/Affect/Coping skills/emotional control: Appropriate to situation    Physical Exam:  Balance:    -       deficits noted for dynamic standing when performing oral care at sink   Postural examination/scapula alignment:    -       No postural  abnormalities identified  Skin integrity: Visible skin intact  Edema:  None noted  Sensation:    -       Intact based on occupational performance   Upper Extremity Range of Motion:     -       Right Upper Extremity: WFL  -       Left Upper Extremity: WFL  Upper Extremity Strength:    -       Right Upper Extremity: WFL  -       Left Upper Extremity: WFL    AMPAC 6 Click ADL:  AMPA Total Score: 21    Treatment & Education:  -Pt educated on role of OT, POC, and safety.   -Pt educated on use of RW for safety secondary to balance deficits.   -White board updated.   Education:    Patient left up in chair with call button in reach    GOALS:   Multidisciplinary Problems     Occupational Therapy Goals        Problem: Occupational Therapy Goal    Goal Priority Disciplines Outcome Interventions   Occupational Therapy Goal     OT, PT/OT Ongoing, Progressing    Description:  Goals to be met by: 2/25/2020     Patient will increase functional independence with ADLs by performing:    UE Dressing with Modified [place].  LE Dressing with Modified [place].  Grooming while standing at sink with Supervision.  Toileting from toilet with Supervision for hygiene and clothing management.   Supine to sit with Modified [place].  Toilet transfer to toilet with Supervision.                      History:     Past Medical History:   Diagnosis Date    Anticoagulant long-term use     Blood clot in vein 06/2016    Breast cancer 1994    Cataract     Hypertension     Lung cancer     Lung cancer metastatic to brain     Pancreatic cancer 1998    Posterior capsular opacification, left eye     Psychiatric problem     Seizures 01/25/2016    Stroke     Therapy        Past Surgical History:   Procedure Laterality Date    BONE BIOSPY  3/9/16    BRAIN SURGERY  1/12/16    tumor removal 1/12/16    BREAST LUMPECTOMY  1998    left    CATARACT EXTRACTION Bilateral 2004    yag OU    CHOLECYSTECTOMY  1998    COLONOSCOPY N/A  11/13/2015    Procedure: COLONOSCOPY;  Surgeon: Alex Carmona MD;  [facility]  Service: Endoscopy;  Laterality: N/A;  2 year f/u    COLONOSCOPY N/A 11/7/2018    Procedure: COLONOSCOPY;  Surgeon: Jim Raman MD;  [facility]  Service: Endoscopy;  Laterality: N/A;  Eliquis - per Dr. Vazquez -ok to hold Eliquis x 2 days prior to colon- ERW    cysto and right ureteral stent  4/27/12    ecoli  2010    removal of blockage, done throat, then through the liver.    HYSTERECTOMY  1974    KIDNEY STONE SURGERY  2010--laser    left eswl  6/20/12    OOPHORECTOMY  4/25/2012    Laparoscopic BSO, lysis of adhesions, cystoscopy greater than 35mins     WHIPPLE PROCEDURE W/ LAPAROSCOPY  1998       Time Tracking:     OT Date of Treatment: 02/18/20  OT Start Time: 1059  OT Stop Time: 1118  OT Total Time (min): 19 min    Billable Minutes:Evaluation 19    ZARINA Dubois  2/18/2020     I certify that I was present in the room directing the student in service delivery and guiding them using my skilled judgment. As the co-signing therapist I have reviewed the students documentation and am responsible for the treatment, assessment, and plan.

## 2020-02-19 ENCOUNTER — PATIENT OUTREACH (OUTPATIENT)
Dept: ADMINISTRATIVE | Facility: OTHER | Age: 75
End: 2020-02-19

## 2020-02-19 VITALS
BODY MASS INDEX: 21.99 KG/M2 | WEIGHT: 136.81 LBS | TEMPERATURE: 97 F | HEIGHT: 66 IN | HEART RATE: 94 BPM | RESPIRATION RATE: 17 BRPM | OXYGEN SATURATION: 100 % | SYSTOLIC BLOOD PRESSURE: 125 MMHG | DIASTOLIC BLOOD PRESSURE: 58 MMHG

## 2020-02-19 LAB
ALBUMIN SERPL BCP-MCNC: 2.1 G/DL (ref 3.5–5.2)
ALP SERPL-CCNC: 392 U/L (ref 55–135)
ALT SERPL W/O P-5'-P-CCNC: 162 U/L (ref 10–44)
ANION GAP SERPL CALC-SCNC: 5 MMOL/L (ref 8–16)
AST SERPL-CCNC: 170 U/L (ref 10–40)
BASOPHILS # BLD AUTO: 0.04 K/UL (ref 0–0.2)
BASOPHILS NFR BLD: 0.7 % (ref 0–1.9)
BILIRUB SERPL-MCNC: 0.6 MG/DL (ref 0.1–1)
BUN SERPL-MCNC: 23 MG/DL (ref 8–23)
CALCIUM SERPL-MCNC: 7.8 MG/DL (ref 8.7–10.5)
CHLORIDE SERPL-SCNC: 117 MMOL/L (ref 95–110)
CO2 SERPL-SCNC: 18 MMOL/L (ref 23–29)
CREAT SERPL-MCNC: 1.9 MG/DL (ref 0.5–1.4)
DIFFERENTIAL METHOD: ABNORMAL
EOSINOPHIL # BLD AUTO: 0.8 K/UL (ref 0–0.5)
EOSINOPHIL NFR BLD: 13.2 % (ref 0–8)
ERYTHROCYTE [DISTWIDTH] IN BLOOD BY AUTOMATED COUNT: 16.9 % (ref 11.5–14.5)
EST. GFR  (AFRICAN AMERICAN): 29.3 ML/MIN/1.73 M^2
EST. GFR  (NON AFRICAN AMERICAN): 25.4 ML/MIN/1.73 M^2
GLUCOSE SERPL-MCNC: 85 MG/DL (ref 70–110)
HCT VFR BLD AUTO: 26.1 % (ref 37–48.5)
HGB BLD-MCNC: 7.8 G/DL (ref 12–16)
IMM GRANULOCYTES # BLD AUTO: 0.06 K/UL (ref 0–0.04)
IMM GRANULOCYTES NFR BLD AUTO: 1.1 % (ref 0–0.5)
LYMPHOCYTES # BLD AUTO: 1.2 K/UL (ref 1–4.8)
LYMPHOCYTES NFR BLD: 21.4 % (ref 18–48)
MAGNESIUM SERPL-MCNC: 1.7 MG/DL (ref 1.6–2.6)
MCH RBC QN AUTO: 25.7 PG (ref 27–31)
MCHC RBC AUTO-ENTMCNC: 29.9 G/DL (ref 32–36)
MCV RBC AUTO: 86 FL (ref 82–98)
MONOCYTES # BLD AUTO: 0.9 K/UL (ref 0.3–1)
MONOCYTES NFR BLD: 15.3 % (ref 4–15)
NEUTROPHILS # BLD AUTO: 2.8 K/UL (ref 1.8–7.7)
NEUTROPHILS NFR BLD: 48.3 % (ref 38–73)
NRBC BLD-RTO: 0 /100 WBC
PHOSPHATE SERPL-MCNC: 2.6 MG/DL (ref 2.7–4.5)
PLATELET # BLD AUTO: 182 K/UL (ref 150–350)
PMV BLD AUTO: 12 FL (ref 9.2–12.9)
POTASSIUM SERPL-SCNC: 4.6 MMOL/L (ref 3.5–5.1)
PROT SERPL-MCNC: 5.2 G/DL (ref 6–8.4)
RBC # BLD AUTO: 3.03 M/UL (ref 4–5.4)
SODIUM SERPL-SCNC: 140 MMOL/L (ref 136–145)
WBC # BLD AUTO: 5.7 K/UL (ref 3.9–12.7)

## 2020-02-19 PROCEDURE — 99239 PR HOSPITAL DISCHARGE DAY,>30 MIN: ICD-10-PCS | Mod: GC,,, | Performed by: INTERNAL MEDICINE

## 2020-02-19 PROCEDURE — 83735 ASSAY OF MAGNESIUM: CPT | Mod: HCNC

## 2020-02-19 PROCEDURE — 25000003 PHARM REV CODE 250: Mod: HCNC | Performed by: INTERNAL MEDICINE

## 2020-02-19 PROCEDURE — 85025 COMPLETE CBC W/AUTO DIFF WBC: CPT | Mod: HCNC

## 2020-02-19 PROCEDURE — 63600175 PHARM REV CODE 636 W HCPCS: Mod: HCNC | Performed by: STUDENT IN AN ORGANIZED HEALTH CARE EDUCATION/TRAINING PROGRAM

## 2020-02-19 PROCEDURE — 36415 COLL VENOUS BLD VENIPUNCTURE: CPT | Mod: HCNC

## 2020-02-19 PROCEDURE — 25000003 PHARM REV CODE 250: Mod: HCNC | Performed by: STUDENT IN AN ORGANIZED HEALTH CARE EDUCATION/TRAINING PROGRAM

## 2020-02-19 PROCEDURE — 99239 HOSP IP/OBS DSCHRG MGMT >30: CPT | Mod: GC,,, | Performed by: INTERNAL MEDICINE

## 2020-02-19 PROCEDURE — 84100 ASSAY OF PHOSPHORUS: CPT | Mod: HCNC

## 2020-02-19 PROCEDURE — 80053 COMPREHEN METABOLIC PANEL: CPT | Mod: HCNC

## 2020-02-19 RX ORDER — SODIUM,POTASSIUM PHOSPHATES 280-250MG
2 POWDER IN PACKET (EA) ORAL 3 TIMES DAILY
Status: DISCONTINUED | OUTPATIENT
Start: 2020-02-19 | End: 2020-02-19 | Stop reason: HOSPADM

## 2020-02-19 RX ORDER — LEVOFLOXACIN 250 MG/1
250 TABLET ORAL DAILY
Qty: 4 TABLET | Refills: 0 | Status: SHIPPED | OUTPATIENT
Start: 2020-02-20 | End: 2020-02-24

## 2020-02-19 RX ADMIN — PANCRELIPASE 1 CAPSULE: 60000; 12000; 38000 CAPSULE, DELAYED RELEASE PELLETS ORAL at 08:02

## 2020-02-19 RX ADMIN — CIPROFLOXACIN HYDROCHLORIDE 500 MG: 250 TABLET, FILM COATED ORAL at 10:02

## 2020-02-19 RX ADMIN — POTASSIUM & SODIUM PHOSPHATES POWDER PACK 280-160-250 MG 2 PACKET: 280-160-250 PACK at 01:02

## 2020-02-19 RX ADMIN — CALCIUM 1000 MG: 500 TABLET ORAL at 10:02

## 2020-02-19 RX ADMIN — POTASSIUM & SODIUM PHOSPHATES POWDER PACK 280-160-250 MG 2 PACKET: 280-160-250 PACK at 10:02

## 2020-02-19 RX ADMIN — THERA TABS 1 TABLET: TAB at 10:02

## 2020-02-19 RX ADMIN — LORAZEPAM 1 MG: 1 TABLET ORAL at 10:02

## 2020-02-19 RX ADMIN — LEVETIRACETAM 500 MG: 500 TABLET ORAL at 10:02

## 2020-02-19 RX ADMIN — DRONABINOL 5 MG: 2.5 CAPSULE ORAL at 06:02

## 2020-02-19 RX ADMIN — PANCRELIPASE 1 CAPSULE: 60000; 12000; 38000 CAPSULE, DELAYED RELEASE PELLETS ORAL at 12:02

## 2020-02-19 RX ADMIN — APIXABAN 5 MG: 5 TABLET, FILM COATED ORAL at 10:02

## 2020-02-19 NOTE — PROGRESS NOTES
"[facility]  Palliative Medicine  Progress Note    Patient Name: Naty St  MRN: [id number]  Admission Date: 2/17/2020  Hospital Length of Stay: 1 days  Code Status: DNR   Attending Provider: RONALDO Roberts MD  Consulting Provider: Shaka Ann MD  Primary Care Physician: Olvin Mars MD  Principal Problem:Severe sepsis    Patient information was obtained from patient and ER records.      Assessment/Plan:     Palliative care encounter  75 yo female with h/o multiple types of cancer in the remote past, more recently with Stage 4 NSCLC adeno of the right lung and declining performance status admitted after recent bacteremia for increased lethargy, fevers, and confusions.  1) Symptoms:denies    2) Code status: DNR as per today's discussion; verified with pt    3) Psychosocial:  to current  for over 30 years; they both have children form previous marriages    4) Medicolegal: no ACP/LW; hopes to complete before she leaves the hospital    5) Spiritual:  Tenriism;"reformed Cheondoism"-good family  involved; no existential distress    6) Goals of care/discussion:  2.18 Brief visit today with the pt.  She remembered the details of our meeting yesterday and was thankful for the visit.  She gave permission to introduce a LaPOST form and recognized it as a method to ensure her wishes were carried out moving forward.  I left in the room for the pt to review with her .  I will follow up and help answer questions as well as aid in completion of ACP documents.     2.17 I initially met the pt along with her  on the heels of a discussions with her admitting oncology team.  The pt was welcoming of additional discussions, and after some initial hesitation, the  was also engaging.      They both recognize her health has continued to decline over the past few months and it has caused them to search for meaning in her clinical course.  She feels she has " "already outlived her prognosis and is ready for "whatever God has in store" for her.  She defines this as being accepting of her death if she were to die tonight or in months, but is keenly aware of her desires to avoid burdensome treatment that would not help to provide her continued QOL she still enjoys.      She affirmed her desire to avoid unnecessary prolonging of the dying process and agrees with DNR. Her  was supportive.  We briefly discussed the role of hospice can play in people's lives when they choose to avoid hospitalization and choose to focus on a symptom based comfort care plan.  They acknowledged they have had good experiences in the past with other family members and will consider all options.    They agreed to ongoing conversations and will complete ACP and LaPOST prior to discharge,    7) Disposition plan: home with outpt follow up          I will follow-up with patient. Please contact us if you have any additional questions.    Subjective:     Chief Complaint:   Chief Complaint   Patient presents with    Fever     Patient reports that she woke up tonight not feeling well. States that she was recently treated for sepsis. Has history of multiple types of cancer.        HPI:   Per HPI":  Ms. St is a 74 year-old female who presented to [facility] ED on 2/17 with fever and confusion. PMHx includes recent bacteremia s/p p.o. Cipro, metastatic R lung adenocarcinoma on Xgeva and Tarceva, breast ca s/p L lumpectomy, meningioma s/p resection, pancreatic ca s/p whipple, and s/p hysterectomy w/ b/l salpingo-oophorectomy, HTN.    the patient states she completed her course of ciprofloxacin without issues and has been feeling well off antimicrobial therapy for more than a week's time.  On the evening of admission, the patient awoke to use the restroom and shortly thereafter was found by her  to be disoriented and confused.  Her  noted she was febrile with a home measured temperature of 103°. "  The patient's  abruptly brought her to the emergency room.  On arrival, patient reported transient mild nausea and epigastric abdominal pain. She was not noted to be experiencing chest pain, shortness of breath, dizziness, weakness, headache, eye pain, ear pain, sore throat, dysuria, hematuria.  In the emergency room, the patient was febrile with a temperature of >102° .  She was started on vancomycin and Zosyn and her fever curve subsequently decreased.  When the patient was seen by medical oncology in the emergency department (after receiving vanc and zosyn) she was no longer encephalopathic.    In this setting, palliative medicine was consulted to help with medical decision making and aid in the formation of goals of care.       Hospital Course:  No notes on file    Interval history: Pt upright in chair and eating lunch.  Continues to feel well.  Slept well and no complaints of pain, dyspnea, or nausea.    Past Medical History:   Diagnosis Date    Anticoagulant long-term use     Blood clot in vein 06/2016    Breast cancer 1994    Cataract     Hypertension     Lung cancer     Lung cancer metastatic to brain     Pancreatic cancer 1998    Posterior capsular opacification, left eye     Psychiatric problem     Seizures 01/25/2016    Stroke     Therapy        Past Surgical History:   Procedure Laterality Date    BONE BIOSPY  3/9/16    BRAIN SURGERY  1/12/16    tumor removal 1/12/16    BREAST LUMPECTOMY  1998    left    CATARACT EXTRACTION Bilateral 2004    yag OU    CHOLECYSTECTOMY  1998    COLONOSCOPY N/A 11/13/2015    Procedure: COLONOSCOPY;  Surgeon: Alex Carmona MD;  Location: [address]);  Service: Endoscopy;  Laterality: N/A;  2 year f/u    COLONOSCOPY N/A 11/7/2018    Procedure: COLONOSCOPY;  Surgeon: Jim Raman MD;  Location: [address]);  Service: Endoscopy;  Laterality: N/A;  Eliquis - per Dr. George santiago to hold Eliquis x 2 days prior to colon- ERW     cysto and right ureteral stent  12    ecoli      removal of blockage, done throat, then through the liver.    HYSTERECTOMY  1974    KIDNEY STONE SURGERY  --laser    left eswl  12    OOPHORECTOMY  2012    Laparoscopic BSO, lysis of adhesions, cystoscopy greater than 35mins     WHIPPLE PROCEDURE W/ LAPAROSCOPY         Review of patient's allergies indicates:   Allergen Reactions    Tobradex [tobramycin-dexamethasone] Swelling    Iodinated contrast media Hives    Latex Rash and Hives    Phenytoin sodium extended Other (See Comments) and Rash       Medications:  Continuous Infusions:  Scheduled Meds:   amitriptyline  50 mg Oral QHS    apixaban  5 mg Oral BID    calcium carbonate  1,000 mg Oral Daily    ciprofloxacin HCl  500 mg Oral Daily    dronabinol  5 mg Oral BID AC    levETIRAcetam  500 mg Oral BID    lipase-protease-amylase 12,000-38,000-60,000 units  1 capsule Oral TID WM    LORazepam  1 mg Oral BID    losartan  50 mg Oral QHS    multivitamin  1 tablet Oral Daily    polyethylene glycol  17 g Oral Daily    senna-docusate 8.6-50 mg  1 tablet Oral Daily     PRN Meds:acetaminophen, albuterol-ipratropium, Dextrose 10% Bolus, Dextrose 10% Bolus, glucagon (human recombinant), glucose, glucose, insulin aspart U-100, ondansetron, promethazine, sodium chloride 0.9%    Family History     Problem Relation (Age of Onset)    Breast cancer Mother    Leukemia Paternal Grandfather    Lung cancer Mother    Stomach cancer Paternal Grandmother        Tobacco Use    Smoking status: Former Smoker     Packs/day: 0.00     Years: 0.00     Pack years: 0.00     Last attempt to quit: 1961     Years since quittin.4    Smokeless tobacco: Never Used   Substance and Sexual Activity    Alcohol use: No    Drug use: No    Sexual activity: Not Currently     Partners: Male     Birth control/protection: See Surgical Hx       Review of Systems  Objective:     Vital Signs (Most  Recent):  Temp: 98.1 °F (36.7 °C) (02/18/20 1929)  Pulse: 90 (02/18/20 1957)  Resp: 18 (02/18/20 1929)  BP: 132/66 (02/18/20 1929)  SpO2: 99 % (02/18/20 1929) Vital Signs (24h Range):  Temp:  [97.7 °F (36.5 °C)-98.6 °F (37 °C)] 98.1 °F (36.7 °C)  Pulse:  [] 90  Resp:  [16-20] 18  SpO2:  [95 %-100 %] 99 %  BP: (107-132)/(62-66) 132/66     Weight: 61.8 kg (136 lb 3.9 oz)  Body mass index is 21.99 kg/m².    Review of Symptoms  Constitutional: Positive for fever. Negative for activity change, appetite change, chills, diaphoresis and fatigue.   HENT: Negative for congestion.    Eyes: Negative for visual disturbance.   Respiratory: Positive for cough ( Nonproductive). Negative for chest tightness, shortness of breath and wheezing.    Cardiovascular: Negative for chest pain, palpitations and leg swelling.   Gastrointestinal: Negative for abdominal distention and abdominal pain.   Genitourinary: Negative for dysuria and frequency.   Musculoskeletal: Negative for myalgias.   Neurological: Negative for seizures, weakness and headaches.   Hematological: Negative for adenopathy. Bruises/bleeds easily.   Psychiatric/Behavioral: Positive for confusion. Negative for agitation and behavioral problems.   Symptom Assessment (ESAS 0-10 scale)   ESAS 0 1 2 3 4 5 6 7 8 9 10   Pain x             Dyspnea   x           Anxiety x             Nausea x             Depression  x             Anorexia   x           Fatigue   x           Insomnia x             Restlessness  x             Agitation x               Bowel Management Plan (BMP): Yes    Comments: none    Pain Assessment: none    OME in 24 hours: 0    Performance Status: 70    ECOG Performance Status stGstrstastdstest:st st1st Physical Exam  Constitutional: She is oriented to person, place, and time. She appears well-developed and well-nourished. No distress.   HENT:   Head: Normocephalic.   Mouth/Throat: No oropharyngeal exudate.   Eyes: Pupils are equal, round, and reactive to light. EOM are  normal. No scleral icterus.   Neck: Normal range of motion. Neck supple. No JVD present.   Cardiovascular: Normal rate, regular rhythm and intact distal pulses.   Pulmonary/Chest: Effort normal and breath sounds normal. No respiratory distress.   Abdominal: Soft. Bowel sounds are normal.   Musculoskeletal: She exhibits no edema.   Neurological: She is alert and oriented to person, place, and time.   Skin: Skin is warm and dry. Capillary refill takes less than 2 seconds. No rash noted. She is not diaphoretic. No erythema.   Psychiatric: She has a normal mood and affect.    Significant Labs:   CBC:   Recent Labs   Lab 02/17/20  0304 02/18/20  0531   WBC 11.20 10.57   HGB 8.8* 8.8*   HCT 30.2* 28.4*    152     CMP:   Recent Labs   Lab 02/17/20  0304 02/18/20  0531    144   K 3.9 5.1    118*   CO2 17* 14*    69*   BUN 29* 26*   CREATININE 1.7* 1.7*   CALCIUM 8.4* 7.9*   PROT 6.1 5.4*   ALBUMIN 2.8* 2.2*   BILITOT 2.7* 1.2*   ALKPHOS 720* 481*   * 328*   * 225*   ANIONGAP 11 12   EGFRNONAA 29.1* 29.1*     CBC:   Recent Labs   Lab 02/18/20  0531   WBC 10.57   HGB 8.8*   HCT 28.4*   MCV 84        BMP:  Recent Labs   Lab 02/18/20  0531   GLU 69*      K 5.1   *   CO2 14*   BUN 26*   CREATININE 1.7*   CALCIUM 7.9*   MG 1.6     LFT:  Lab Results   Component Value Date     (H) 02/18/2020     (H) 01/17/2020    ALKPHOS 481 (H) 02/18/2020    BILITOT 1.2 (H) 02/18/2020     Albumin:   Albumin   Date Value Ref Range Status   02/18/2020 2.2 (L) 3.5 - 5.2 g/dL Final     Protein:   Total Protein   Date Value Ref Range Status   02/18/2020 5.4 (L) 6.0 - 8.4 g/dL Final     Lactic acid:   Lab Results   Component Value Date    LACTATE 0.9 02/17/2020    LACTATE 1.1 02/17/2020       Significant Imaging: I have reviewed all pertinent imaging results/findings within the past 24 hours.    Advance Care Planning   Advanced Directives::  Living Will: No  LaPOST: No  Do Not  Resuscitate Status: Yes  Medical Power of : No; spouse is surrogate Troy Cross Spouse [phone]         Decision-Making Capacity: Patient answered questions, Family answered questions       Living Arrangements: Lives with spouse    Psychosocial/Cultural:  Patient's most important priorities:  Family and being at home    Patient's biggest concerns/fears:  Having her death unnecessarily prolonged    Previous death/end of life care history:  Her nephew  several months ago in an ICU; she knows she doesn't want that    Patient's goals/hopes:  Good QOL and minimizing suffering    Spiritual:     F- Amairani and Belief: Violet    I - Importance: yes  .  C - Community: very much so    A - Address in Care: family  closely invovled      > 50% of 27 min visit spent in chart review, face to face discussion of goals of care,  symptom assessment, coordination of care and emotional support.    Shaka Ann MD  Palliative Medicine  [facility]

## 2020-02-19 NOTE — PROGRESS NOTES
Brief pallaitive care note:     Patient discharged today prior to my being able to see. She has palliative care follow up at home March 3, with Kiya Landin NP.  Will contact her about details of this admission.  Please see prior notes for the patient's thoughts and approach on her serious illness.     She continues to desire disease focused therapy until her team feels she is no longer benefiting.  We discussed options for care in the future, inclduing hospice.  She is very comfortable with the idea and role of hospice given prior experiences with other family members.      We reviewed a LaPOST form and she thought it would be a good idea to formalize her wishes for DNR.  She will review with her .  She can return to clinic to have it completed and scanned.     Follows up with onc next week. Communicated with Dr. Vazquez.     Shaka Ann MD  Palliative Medicine

## 2020-02-19 NOTE — PLAN OF CARE
POC reviewed with patient; understanding verbalized. Pt with nonskid footwear on, bed in lowest position, and locked with bed rails up x2. Pt instructed to call prior to getting OOB. Pt has call light and personal items within reach. Patient ambulates in room independently. Tele monitoring continued. VSS and afebrile this shift. All questions and concerns addressed at this time. Will continue to monitor.

## 2020-02-19 NOTE — PHYSICIAN QUERY
PT Name: Naty St  MR #: [id number]     CDS: Zehra Barroso RN     Contact information:[email or web address]    This form is a permanent document in the medical record.     Query Date: February 19, 2020    Physician Query - Neurological Condition Clarification    By submitting this query, we are merely seeking further clarification of documentation to reflect the severity of illness of your patient. Please utilize your independent clinical judgment when addressing the question(s) below.    The Medical record reflects the following:     Indicators   Supporting Clinical Findings Location in Medical Record   x AMS, Confusion,  LOC, etc.  74 year-old female who presented to [facility] ED on 2/17 with fever and confusion. PMHx includes recent bacteremia s/p p.o. Cipro, metastatic R lung adenocarcinoma on Xgeva and Tarceva, breast ca s/p L lumpectomy, meningioma s/p resection, pancreatic ca s/p whipple, and s/p hysterectomy w/ b/l salpingo-oophorectomy, HTN.        When the patient was seen by medical oncology in the emergency department (after receiving vanc and zosyn) she was no longer encephalopathic. H&P 2/17   x Acute / Chronic Illness Severe sepsis  74F with recent bacteremia admitted with fever and encephalopathy. Patient received IV fluids, vancomycin and Zosyn in the emergency department.    Plan:  Continue Zosyn  Procal elevated  Follow-up cultures  Q4 vitals   Daily CBC H&P 2/17    Radiology Findings      Electrolyte Imbalance      Medication      Treatment             x Other Admitted to Medical Oncology on 02/17/2020 for sepsis after presenting to the emergency department febrile and encephalopathic.  Patient received vancomycin and Zosyn in the emergency department and was continued on Zosyn alone when she reached floor.  Goals of care discussed with patient at bedside when she arrived to the floor and she opted to be made DNR D/C Summary 2/19     Encephalopathy- is a general term for any diffuse  disease of the brain that alters brain function or structure. Treatment of the cognitive dysfunction varies but is ultimately dependent on the treatment of the underlying condition.    Major Symptoms of Encephalopathy - Decreased level of consciousness, fluctuating alertness/concentration, confusion, agitation, lethargy, somnolence, drowsiness, obtundation, stupor, or coma.         References: National Institutes of Healths (NIH) National [place] of Neurological Disorders and Strokes;  HCPro 2016; Advisory Board     Clinical Guidelines:   These guidelines will set system standards to assist providers in managing, documentation, and coding of encephalopathy. The intent of this document is to serve as a system guideline, not replace the providers clinical judgment:    Provider, please further specify encephalopathy, if possible.     [  X ] Metabolic Encephalopathy - Due to electrolye imbalance, metabolic derangements, or infections processes, includes Septic Encephalopathy   [   ] Other Encephalopathy - Includes uremic encephalopathy   [   ] Unspecified Encephalopathy      [   ] Other Neurological Condition-  Includes Post-ictal altered mental status. (please specify condition): _________   [   ]  Clinically Undetermined     Please document in your progress notes daily for the duration of treatment until resolved, and include in your discharge summary.

## 2020-02-19 NOTE — SUBJECTIVE & OBJECTIVE
Interval history: Pt upright in chair and eating lunch.  Continues to feel well.  Slept well and no complaints of pain, dyspnea, or nausea.    Past Medical History:   Diagnosis Date    Anticoagulant long-term use     Blood clot in vein 06/2016    Breast cancer 1994    Cataract     Hypertension     Lung cancer     Lung cancer metastatic to brain     Pancreatic cancer 1998    Posterior capsular opacification, left eye     Psychiatric problem     Seizures 01/25/2016    Stroke     Therapy        Past Surgical History:   Procedure Laterality Date    BONE BIOSPY  3/9/16    BRAIN SURGERY  1/12/16    tumor removal 1/12/16    BREAST LUMPECTOMY  1998    left    CATARACT EXTRACTION Bilateral 2004    yag OU    CHOLECYSTECTOMY  1998    COLONOSCOPY N/A 11/13/2015    Procedure: COLONOSCOPY;  Surgeon: Alex Carmona MD;  [facility]  Service: Endoscopy;  Laterality: N/A;  2 year f/u    COLONOSCOPY N/A 11/7/2018    Procedure: COLONOSCOPY;  Surgeon: Jim Raman MD;  Location: [facility] ([facility]);  Service: Endoscopy;  Laterality: N/A;  Eliquis - per Dr. George santiago to hold Eliquis x 2 days prior to colon- ERW    cysto and right ureteral stent  4/27/12    ecoli  2010    removal of blockage, done throat, then through the liver.    HYSTERECTOMY  1974    KIDNEY STONE SURGERY  2010--laser    left eswl  6/20/12    OOPHORECTOMY  4/25/2012    Laparoscopic BSO, lysis of adhesions, cystoscopy greater than 35mins     WHIPPLE PROCEDURE W/ LAPAROSCOPY  1998       Review of patient's allergies indicates:   Allergen Reactions    Tobradex [tobramycin-dexamethasone] Swelling    Iodinated contrast media Hives    Latex Rash and Hives    Phenytoin sodium extended Other (See Comments) and Rash       Medications:  Continuous Infusions:  Scheduled Meds:   amitriptyline  50 mg Oral QHS    apixaban  5 mg Oral BID    calcium carbonate  1,000 mg Oral Daily    ciprofloxacin HCl  500 mg Oral Daily     dronabinol  5 mg Oral BID AC    levETIRAcetam  500 mg Oral BID    lipase-protease-amylase 12,000-38,000-60,000 units  1 capsule Oral TID WM    LORazepam  1 mg Oral BID    losartan  50 mg Oral QHS    multivitamin  1 tablet Oral Daily    polyethylene glycol  17 g Oral Daily    senna-docusate 8.6-50 mg  1 tablet Oral Daily     PRN Meds:acetaminophen, albuterol-ipratropium, Dextrose 10% Bolus, Dextrose 10% Bolus, glucagon (human recombinant), glucose, glucose, insulin aspart U-100, ondansetron, promethazine, sodium chloride 0.9%    Family History     Problem Relation (Age of Onset)    Breast cancer Mother    Leukemia Paternal Grandfather    Lung cancer Mother    Stomach cancer Paternal Grandmother        Tobacco Use    Smoking status: Former Smoker     Packs/day: 0.00     Years: 0.00     Pack years: 0.00     Last attempt to quit: 1961     Years since quittin.4    Smokeless tobacco: Never Used   Substance and Sexual Activity    Alcohol use: No    Drug use: No    Sexual activity: Not Currently     Partners: Male     Birth control/protection: See Surgical Hx       Review of Systems  Objective:     Vital Signs (Most Recent):  Temp: 98.1 °F (36.7 °C) (20)  Pulse: 90 (20)  Resp: 18 (20)  BP: 132/66 (20)  SpO2: 99 % (20) Vital Signs (24h Range):  Temp:  [97.7 °F (36.5 °C)-98.6 °F (37 °C)] 98.1 °F (36.7 °C)  Pulse:  [] 90  Resp:  [16-20] 18  SpO2:  [95 %-100 %] 99 %  BP: (107-132)/(62-66) 132/66     Weight: 61.8 kg (136 lb 3.9 oz)  Body mass index is 21.99 kg/m².    Review of Symptoms  Constitutional: Positive for fever. Negative for activity change, appetite change, chills, diaphoresis and fatigue.   HENT: Negative for congestion.    Eyes: Negative for visual disturbance.   Respiratory: Positive for cough ( Nonproductive). Negative for chest tightness, shortness of breath and wheezing.    Cardiovascular: Negative for chest pain, palpitations and  leg swelling.   Gastrointestinal: Negative for abdominal distention and abdominal pain.   Genitourinary: Negative for dysuria and frequency.   Musculoskeletal: Negative for myalgias.   Neurological: Negative for seizures, weakness and headaches.   Hematological: Negative for adenopathy. Bruises/bleeds easily.   Psychiatric/Behavioral: Positive for confusion. Negative for agitation and behavioral problems.   Symptom Assessment (ESAS 0-10 scale)   ESAS 0 1 2 3 4 5 6 7 8 9 10   Pain x             Dyspnea   x           Anxiety x             Nausea x             Depression  x             Anorexia   x           Fatigue   x           Insomnia x             Restlessness  x             Agitation x               Bowel Management Plan (BMP): Yes    Comments: none    Pain Assessment: none    OME in 24 hours: 0    Performance Status: 70    ECOG Performance Status rdGrdrrdarddrderd:rd rd3rd Physical Exam  Constitutional: She is oriented to person, place, and time. She appears well-developed and well-nourished. No distress.   HENT:   Head: Normocephalic.   Mouth/Throat: No oropharyngeal exudate.   Eyes: Pupils are equal, round, and reactive to light. EOM are normal. No scleral icterus.   Neck: Normal range of motion. Neck supple. No JVD present.   Cardiovascular: Normal rate, regular rhythm and intact distal pulses.   Pulmonary/Chest: Effort normal and breath sounds normal. No respiratory distress.   Abdominal: Soft. Bowel sounds are normal.   Musculoskeletal: She exhibits no edema.   Neurological: She is alert and oriented to person, place, and time.   Skin: Skin is warm and dry. Capillary refill takes less than 2 seconds. No rash noted. She is not diaphoretic. No erythema.   Psychiatric: She has a normal mood and affect.    Significant Labs:   CBC:   Recent Labs   Lab 02/17/20  0304 02/18/20  0531   WBC 11.20 10.57   HGB 8.8* 8.8*   HCT 30.2* 28.4*    152     CMP:   Recent Labs   Lab 02/17/20  0304 02/18/20  0531    144   K 3.9  5.1    118*   CO2 17* 14*    69*   BUN 29* 26*   CREATININE 1.7* 1.7*   CALCIUM 8.4* 7.9*   PROT 6.1 5.4*   ALBUMIN 2.8* 2.2*   BILITOT 2.7* 1.2*   ALKPHOS 720* 481*   * 328*   * 225*   ANIONGAP 11 12   EGFRNONAA 29.1* 29.1*     CBC:   Recent Labs   Lab 20  0531   WBC 10.57   HGB 8.8*   HCT 28.4*   MCV 84        BMP:  Recent Labs   Lab 20  0531   GLU 69*      K 5.1   *   CO2 14*   BUN 26*   CREATININE 1.7*   CALCIUM 7.9*   MG 1.6     LFT:  Lab Results   Component Value Date     (H) 2020     (H) 2020    ALKPHOS 481 (H) 2020    BILITOT 1.2 (H) 2020     Albumin:   Albumin   Date Value Ref Range Status   2020 2.2 (L) 3.5 - 5.2 g/dL Final     Protein:   Total Protein   Date Value Ref Range Status   2020 5.4 (L) 6.0 - 8.4 g/dL Final     Lactic acid:   Lab Results   Component Value Date    LACTATE 0.9 2020    LACTATE 1.1 2020       Significant Imaging: I have reviewed all pertinent imaging results/findings within the past 24 hours.    Advance Care Planning   Advanced Directives::  Living Will: No  LaPOST: No  Do Not Resuscitate Status: Yes  Medical Power of : No; spouse is surrogate LESTER   Troy St Spouse [phone]         Decision-Making Capacity: Patient answered questions, Family answered questions       Living Arrangements: Lives with spouse    Psychosocial/Cultural:  Patient's most important priorities:  Family and being at home    Patient's biggest concerns/fears:  Having her death unnecessarily prolonged    Previous death/end of life care history:  Her nephew  several months ago in an ICU; she knows she doesn't want that    Patient's goals/hopes:  Good QOL and minimizing suffering    Spiritual:     F- Amairani and Belief: Violet    I - Importance: yes  .  C - Community: very much so    A - Address in Care: family  closely invovled

## 2020-02-19 NOTE — ASSESSMENT & PLAN NOTE
"73 yo female with h/o multiple types of cancer in the remote past, more recently with Stage 4 NSCLC adeno of the right lung and declining performance status admitted after recent bacteremia for increased lethargy, fevers, and confusions.  1) Symptoms:denies    2) Code status: DNR as per today's discussion; verified with pt    3) Psychosocial:  to current  for over 30 years; they both have children form previous marriages    4) Medicolegal: no ACP/LW; hopes to complete before she leaves the hospital    5) Spiritual:  Advent;"reformed Adventist"-good family  involved; no existential distress    6) Goals of care/discussion:  2.18 Brief visit today with the pt.  She remembered the details of our meeting yesterday and was thankful for the visit.  She gave permission to introduce a LaPOST form and recognized it as a method to ensure her wishes were carried out moving forward.  I left in the room for the pt to review with her .  I will follow up and help answer questions as well as aid in completion of ACP documents.     2.17 I initially met the pt along with her  on the heels of a discussions with her admitting oncology team.  The pt was welcoming of additional discussions, and after some initial hesitation, the  was also engaging.      They both recognize her health has continued to decline over the past few months and it has caused them to search for meaning in her clinical course.  She feels she has already outlived her prognosis and is ready for "whatever God has in store" for her.  She defines this as being accepting of her death if she were to die tonight or in months, but is keenly aware of her desires to avoid burdensome treatment that would not help to provide her continued QOL she still enjoys.      She affirmed her desire to avoid unnecessary prolonging of the dying process and agrees with DNR. Her  was supportive.  We briefly discussed the role of hospice can " play in people's lives when they choose to avoid hospitalization and choose to focus on a symptom based comfort care plan.  They acknowledged they have had good experiences in the past with other family members and will consider all options.    They agreed to ongoing conversations and will complete ACP and LaPOST prior to discharge,    7) Disposition plan: home with outpt follow up

## 2020-02-19 NOTE — NURSING
Pt discharged to home at this time per MD Carli order.  All discharge instructions, medications, prescriptions, and follow-up appointments reviewed with pt; copy given and all questions answered to pt's satisfaction.   Pt. verbalized understanding with no further questions; peripheral IV D/C'd with catheter tip intact. .Pt ambulating in room with steady gait; tolerating regular diet; voiding without difficulty. All VSS; O2 sats WNL on RA.  Pt left floor via wheelchair; accompanied by family; no distress noted.

## 2020-02-19 NOTE — DISCHARGE SUMMARY
[facility]  Hematology/Oncology  Discharge Summary      Patient Name: Naty St  MRN: [id number]  Admission Date: 2/17/2020  Hospital Length of Stay: 2 days  Discharge Date and Time:  02/19/2020 11:01 AM  Attending Physician: RONALDO Roberts MD   Discharging Provider: Simone Boyce MD  Primary Care Provider: Olvin Mars MD    HPI: Ms. St is a 74 year-old female who presented to [facility] ED on 2/17 with fever and confusion. PMHx includes recent bacteremia s/p p.o. Cipro, metastatic R lung adenocarcinoma on Xgeva and Tarceva, breast ca s/p L lumpectomy, meningioma s/p resection, pancreatic ca s/p whipple, and s/p hysterectomy w/ b/l salpingo-oophorectomy, HTN.    the patient states she completed her course of ciprofloxacin without issues and has been feeling well off antimicrobial therapy for more than a week's time.  On the evening of admission, the patient awoke to use the restroom and shortly thereafter was found by her  to be disoriented and confused.  Her  noted she was febrile with a home measured temperature of 103°.  The patient's  abruptly brought her to the emergency room.  On arrival, patient reported transient mild nausea and epigastric abdominal pain. She was not noted to be experiencing chest pain, shortness of breath, dizziness, weakness, headache, eye pain, ear pain, sore throat, dysuria, hematuria.  In the emergency room, the patient was febrile with a temperature of >102° .  She was started on vancomycin and Zosyn and her fever curve subsequently decreased.  When the patient was seen by medical oncology in the emergency department (after receiving vanc and zosyn) she was no longer encephalopathic.      * No surgery found *     Hospital Course: Admitted to Medical Oncology on 02/17/2020 for sepsis after presenting to the emergency department febrile and encephalopathic.  Patient received vancomycin and Zosyn in the emergency department  and was continued on Zosyn alone when she reached floor.  Goals of care discussed with patient at bedside when she arrived to the floor and she opted to be made DNR.  Discussions also started regarding hospice.  Palliative Care consulted to assist with additional discussions. Zosyn transitioned to Ciprofloxacin on 2/18 and discharged on levofloxacin on 2/19. Follow-ups arranged with palliative care and oncology.    On 2/19/2020, Patient medically stable for discharge. Discharge recommendations and any changes to patient's medication regimen discussed with the patient prior to discharge and included in the patient's discharge packet.      Physical Exam on Day of Discharge:  Vital Signs Reviewed  Gen: NAD  Pulm: CTA-B  Cardiac: RRR, no murmurs  MSK: no edema present  Neuro: AAOx3  Psych: normal behavior    Consults:   Consults (From admission, onward)        Status Ordering Provider     Inpatient consult to Oncology  Once     Provider:  (Not yet assigned)    Acknowledged MEHNAZ ODONNELL     Inpatient consult to Palliative Care  Once     Provider:  (Not yet assigned)    Completed GAVI DELGADO          Significant Diagnostic Studies: Labs: All labs within the past 24 hours have been reviewed    Pending Diagnostic Studies:     None        Final Active Diagnoses:    Diagnosis Date Noted POA    PRINCIPAL PROBLEM:  Severe sepsis [A41.9, R65.20] 02/17/2020 Yes    Adenocarcinoma of lung [C34.90] 02/17/2020 Yes    Palliative care encounter [Z51.5] 02/17/2020 Not Applicable    Elevated LFTs [R94.5] 01/16/2020 Yes    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] 01/29/2019 Yes    History of deep vein thrombosis (DVT) of lower extremity [Z86.718] 09/21/2018 Not Applicable    Seizure disorder [G40.909] 04/09/2018 Yes    History of stroke [Z86.73] 04/09/2018 Not Applicable    CKD (chronic kidney disease) stage 3, GFR 30-59 ml/min [N18.3] 04/07/2017 Yes    Meningioma [D32.9] 01/12/2016 Yes      Chronic    History of breast cancer [Z85.3] 07/31/2015 Not Applicable      Problems Resolved During this Admission:      Discharged Condition: stable    Disposition: Home or Self Care    Follow Up:    Patient Instructions:      Diet Adult Regular     Notify your health care provider if you experience any of the following:  temperature >100.4     Notify your health care provider if you experience any of the following:  severe uncontrolled pain     Notify your health care provider if you experience any of the following:  difficulty breathing or increased cough     Activity as tolerated     Medications:  Reconciled Home Medications:      Medication List      START taking these medications    levoFLOXacin 250 MG tablet  Commonly known as:  LEVAQUIN  Take 1 tablet (250 mg total) by mouth once daily. for 4 days  Start taking on:  February 20, 2020        CONTINUE taking these medications    albuterol-ipratropium 2.5 mg-0.5 mg/3 mL nebulizer solution  Commonly known as:  DUO-NEB  Take 3 mLs by nebulization every 6 (six) hours as needed for Wheezing or Shortness of Breath. Rescue     amitriptyline 50 MG tablet  Commonly known as:  ELAVIL  Take 1 tablet (50 mg total) by mouth every evening.     calcium carbonate 500 mg calcium (1,250 mg) tablet  Commonly known as:  OS-UNIQUE  Take 2 tablets (1,000 mg total) by mouth once daily.     clindamycin phosphate 1% 1 % gel  Commonly known as:  CLINDAGEL  APPLY TOPICALLY TWICE DAILY     Creon Cpdr  Generic drug:  lipase-protease-amylase 12,000-38,000-60,000 units  TAKE ONE CAPSULE BY MOUTH THREE TIMES A DAY WITH MEALS     denosumab 120 mg/1.7 mL (70 mg/mL) Soln  Commonly known as:  XGEVA  Inject 120 mg into the skin every 28 days.     dronabinol 5 MG capsule  Commonly known as:  MARINOL  Take 1 capsule (5 mg total) by mouth 2 (two) times daily before meals.     Eliquis 5 mg Tab  Generic drug:  apixaban  TAKE ONE TABLET BY MOUTH TWICE DAILY     erlotinib 150 MG tablet  Commonly known as:   Tarceva  Take 1 tablet (150 mg total) by mouth once daily Hold medication until told to restart by Dr. Vazquez.     furosemide 40 MG tablet  Commonly known as:  LASIX  Take 1 tablet (40 mg total) by mouth daily as needed (swelling).     levETIRAcetam 500 MG Tab  Commonly known as:  KEPPRA  TAKE ONE TABLET BY MOUTH TWICE DAILY     LORazepam 1 MG tablet  Commonly known as:  ATIVAN  Take 1 tablet (1 mg total) by mouth 2 (two) times daily.     losartan 50 MG tablet  Commonly known as:  COZAAR  TAKE ONE TABLET BY MOUTH TWICE DAILY     metoprolol succinate 50 MG 24 hr tablet  Commonly known as:  TOPROL-XL  TAKE ONE TABLET BY MOUTH ONCE DAILY. hold UNTIL your appointment WITH your pcp. heart rate and blood pressure has been normal off OF this me     multivitamin per tablet  Commonly known as:  THERAGRAN  Take 1 tablet by mouth once daily.     Myrbetriq 50 mg Tb24  Generic drug:  mirabegron  TAKE ONE TABLET BY MOUTH ONCE DAILY     polyethylene glycol 17 gram/dose powder  Commonly known as:  GLYCOLAX     senna-docusate 8.6-50 mg 8.6-50 mg per tablet  Commonly known as:  Senna Laxative-Stool Softener  Take 1 tablet by mouth once daily.        STOP taking these medications    atorvastatin 40 MG tablet  Commonly known as:  LIPITOR     ciprofloxacin HCl 750 MG tablet  Commonly known as:  AARON Boyce MD  Hematology/Oncology  [facility]    Attending attestation:  I have personally seen and examined the patient reviewed her chart discussed her with our care team.  I agree with the above note.  Overall the patient feels much better today.  She has been seen by Dr. Ann in the did discuss hospice however she would like to wait on this and will see him as an outpatient however she did agree to do not resuscitate status during her hospital stay.  The patient does appear very knowledgeable of her current ongoing situation.  She will be discharged on Levaquin secondary to the fact that her  cultures remain negative and I do not know where her source of fever was.  She has remained afebrile over the past 24 hr and clinically appears well.  The patient wished to go home and appears to have reached maximum hospital benefit at this time.  She will have close follow-up with Dr. Ann and her primary medical oncologist but she is aware to contact us should the fevers recur with any new problems and we will certainly be glad to see her sooner.  Greater than 30 min was spent on this discharge.      Addendum:  The patient was admitted with metabolic encephalopathy secondary to infection.  With antibiotics this was resolved by the day of discharge.

## 2020-02-19 NOTE — PLAN OF CARE
MARTINEZ notified by the Med Onc team that the patient is discharging home today and needs follow-up.    MARTINEZ sent a message to Dr. Vazquez's clinic and requested a hospital follow-up appt with repeat labs (CBC and CMP) in one week.    MARTINEZ sent a message to Dr. Ann's clinic and requested a one week follow-up appt.    Appointments waiting clinic scheduling.    Rina Sanchez RN, BSN, CM  Utilization Management  [facility]

## 2020-02-19 NOTE — PROGRESS NOTES
Received report from my coworker Ale Rodrigues [facility], who is rounding with Dr. Roberts and the Medical Oncology team. Patient is medically stable for discharge home today. MDs not ordering anything for patient's discharge that requires Oncology Social Worker's intervention. Visited with  patient in her room this morning. She was sitting up in the chair at bedside, alert, oriented, and pleasant. She is in agreement with being discharged home today. She again said that she doesn't feel she needs home health services. She wasn't able to read the forms that Dr. Ann (Palliative Care) gave her because her glasses are at home. Encouraged her to review them with her family at home and to call if they have any questions. She denied any needs, concerns, questions today. Patient reported that her  will be coming to the hospital around 2 PM and will drive her home. Spoke with the Fellow and Residents, and with patient's nurse Yelena. Patient is ready for discharge from Oncology Social Worker's perspective.

## 2020-02-20 ENCOUNTER — TELEPHONE (OUTPATIENT)
Dept: HEMATOLOGY/ONCOLOGY | Facility: CLINIC | Age: 75
End: 2020-02-20

## 2020-02-20 NOTE — TELEPHONE ENCOUNTER
Called and spoke with pt. Informed her of appointments tomorrow and pt stated that she would try to be here if she can

## 2020-02-20 NOTE — TELEPHONE ENCOUNTER
----- Message from Ash Sandra MD sent at 2/20/2020  9:01 AM CST -----  Can this patient be seen by a mid level tomorrow with repeat blood cultures? She had positive blood cultures after discharge from the hospital, it is likely a contaminant but we want to make sure she is doing ok.     Thank you,    Ash

## 2020-02-21 ENCOUNTER — HOSPITAL ENCOUNTER (EMERGENCY)
Facility: HOSPITAL | Age: 75
Discharge: HOME | End: 2020-02-21
Attending: EMERGENCY MEDICINE
Payer: MEDICARE

## 2020-02-21 ENCOUNTER — TELEPHONE (OUTPATIENT)
Dept: HEMATOLOGY/ONCOLOGY | Facility: CLINIC | Age: 75
End: 2020-02-21

## 2020-02-21 ENCOUNTER — OFFICE VISIT (OUTPATIENT)
Dept: UROLOGY | Facility: CLINIC | Age: 75
End: 2020-02-21
Payer: MEDICARE

## 2020-02-21 ENCOUNTER — DOCUMENTATION ONLY (OUTPATIENT)
Dept: HEMATOLOGY/ONCOLOGY | Facility: CLINIC | Age: 75
End: 2020-02-21

## 2020-02-21 VITALS
DIASTOLIC BLOOD PRESSURE: 78 MMHG | WEIGHT: 136.25 LBS | BODY MASS INDEX: 22.7 KG/M2 | HEIGHT: 65 IN | HEART RATE: 89 BPM | SYSTOLIC BLOOD PRESSURE: 141 MMHG

## 2020-02-21 VITALS
HEART RATE: 94 BPM | BODY MASS INDEX: 22.66 KG/M2 | DIASTOLIC BLOOD PRESSURE: 84 MMHG | SYSTOLIC BLOOD PRESSURE: 154 MMHG | TEMPERATURE: 98 F | WEIGHT: 136 LBS | RESPIRATION RATE: 18 BRPM | HEIGHT: 65 IN | OXYGEN SATURATION: 99 %

## 2020-02-21 DIAGNOSIS — C34.31 MALIGNANT NEOPLASM OF LOWER LOBE OF RIGHT LUNG: Primary | ICD-10-CM

## 2020-02-21 DIAGNOSIS — E87.5 HYPERKALEMIA: Primary | ICD-10-CM

## 2020-02-21 DIAGNOSIS — E87.5 SERUM POTASSIUM ELEVATED: Primary | ICD-10-CM

## 2020-02-21 DIAGNOSIS — N32.81 OAB (OVERACTIVE BLADDER): Primary | ICD-10-CM

## 2020-02-21 LAB
ANION GAP SERPL CALC-SCNC: 9 MMOL/L (ref 8–16)
BACTERIA BLD CULT: ABNORMAL
BASOPHILS # BLD AUTO: 0.02 K/UL (ref 0–0.2)
BASOPHILS NFR BLD: 0.4 % (ref 0–1.9)
BUN SERPL-MCNC: 23 MG/DL (ref 8–23)
BUN SERPL-MCNC: 32 MG/DL (ref 6–30)
CALCIUM SERPL-MCNC: 8.9 MG/DL (ref 8.7–10.5)
CHLORIDE SERPL-SCNC: 116 MMOL/L (ref 95–110)
CHLORIDE SERPL-SCNC: 118 MMOL/L (ref 95–110)
CO2 SERPL-SCNC: 17 MMOL/L (ref 23–29)
CREAT SERPL-MCNC: 1.5 MG/DL (ref 0.5–1.4)
CREAT SERPL-MCNC: 1.6 MG/DL (ref 0.5–1.4)
DIFFERENTIAL METHOD: ABNORMAL
EOSINOPHIL # BLD AUTO: 0.3 K/UL (ref 0–0.5)
EOSINOPHIL NFR BLD: 5.4 % (ref 0–8)
ERYTHROCYTE [DISTWIDTH] IN BLOOD BY AUTOMATED COUNT: 19.7 % (ref 11.5–14.5)
EST. GFR  (AFRICAN AMERICAN): 39 ML/MIN/1.73 M^2
EST. GFR  (NON AFRICAN AMERICAN): 33.8 ML/MIN/1.73 M^2
GLUCOSE SERPL-MCNC: 75 MG/DL (ref 70–110)
GLUCOSE SERPL-MCNC: 80 MG/DL (ref 70–110)
HCT VFR BLD AUTO: 30.3 % (ref 37–48.5)
HCT VFR BLD CALC: 30 %PCV (ref 36–54)
HGB BLD-MCNC: 8.7 G/DL (ref 12–16)
IMM GRANULOCYTES # BLD AUTO: 0.01 K/UL (ref 0–0.04)
IMM GRANULOCYTES NFR BLD AUTO: 0.2 % (ref 0–0.5)
LYMPHOCYTES # BLD AUTO: 1.7 K/UL (ref 1–4.8)
LYMPHOCYTES NFR BLD: 32.4 % (ref 18–48)
MCH RBC QN AUTO: 25.4 PG (ref 27–31)
MCHC RBC AUTO-ENTMCNC: 28.7 G/DL (ref 32–36)
MCV RBC AUTO: 88 FL (ref 82–98)
MONOCYTES # BLD AUTO: 0.6 K/UL (ref 0.3–1)
MONOCYTES NFR BLD: 11 % (ref 4–15)
NEUTROPHILS # BLD AUTO: 2.6 K/UL (ref 1.8–7.7)
NEUTROPHILS NFR BLD: 50.6 % (ref 38–73)
NRBC BLD-RTO: 0 /100 WBC
PLATELET # BLD AUTO: 300 K/UL (ref 150–350)
PMV BLD AUTO: 12.2 FL (ref 9.2–12.9)
POC IONIZED CALCIUM: 0.93 MMOL/L (ref 1.06–1.42)
POC TCO2 (MEASURED): 17 MMOL/L (ref 23–29)
POTASSIUM BLD-SCNC: 5.8 MMOL/L (ref 3.5–5.1)
POTASSIUM SERPL-SCNC: 5.2 MMOL/L (ref 3.5–5.1)
RBC # BLD AUTO: 3.43 M/UL (ref 4–5.4)
SAMPLE: ABNORMAL
SODIUM BLD-SCNC: 140 MMOL/L (ref 136–145)
SODIUM SERPL-SCNC: 142 MMOL/L (ref 136–145)
WBC # BLD AUTO: 5.19 K/UL (ref 3.9–12.7)

## 2020-02-21 PROCEDURE — 99214 PR OFFICE/OUTPT VISIT, EST, LEVL IV, 30-39 MIN: ICD-10-PCS | Mod: HCNC,S$GLB,, | Performed by: UROLOGY

## 2020-02-21 PROCEDURE — 1101F PR PT FALLS ASSESS DOC 0-1 FALLS W/OUT INJ PAST YR: ICD-10-PCS | Mod: HCNC,CPTII,S$GLB, | Performed by: UROLOGY

## 2020-02-21 PROCEDURE — 99285 EMERGENCY DEPT VISIT HI MDM: CPT | Mod: HCNC,,, | Performed by: EMERGENCY MEDICINE

## 2020-02-21 PROCEDURE — 80048 BASIC METABOLIC PNL TOTAL CA: CPT | Mod: HCNC

## 2020-02-21 PROCEDURE — 80047 BASIC METABLC PNL IONIZED CA: CPT | Mod: HCNC,59

## 2020-02-21 PROCEDURE — 3077F PR MOST RECENT SYSTOLIC BLOOD PRESSURE >= 140 MM HG: ICD-10-PCS | Mod: HCNC,CPTII,S$GLB, | Performed by: UROLOGY

## 2020-02-21 PROCEDURE — 99999 PR PBB SHADOW E&M-EST. PATIENT-LVL III: CPT | Mod: PBBFAC,HCNC,, | Performed by: UROLOGY

## 2020-02-21 PROCEDURE — 99999 PR PBB SHADOW E&M-EST. PATIENT-LVL III: ICD-10-PCS | Mod: PBBFAC,HCNC,, | Performed by: UROLOGY

## 2020-02-21 PROCEDURE — 3078F DIAST BP <80 MM HG: CPT | Mod: HCNC,CPTII,S$GLB, | Performed by: UROLOGY

## 2020-02-21 PROCEDURE — 93010 ELECTROCARDIOGRAM REPORT: CPT | Mod: HCNC,,, | Performed by: INTERNAL MEDICINE

## 2020-02-21 PROCEDURE — 3077F SYST BP >= 140 MM HG: CPT | Mod: HCNC,CPTII,S$GLB, | Performed by: UROLOGY

## 2020-02-21 PROCEDURE — 99285 PR EMERGENCY DEPT VISIT,LEVEL V: ICD-10-PCS | Mod: HCNC,,, | Performed by: EMERGENCY MEDICINE

## 2020-02-21 PROCEDURE — 1126F AMNT PAIN NOTED NONE PRSNT: CPT | Mod: HCNC,S$GLB,, | Performed by: UROLOGY

## 2020-02-21 PROCEDURE — 1101F PT FALLS ASSESS-DOCD LE1/YR: CPT | Mod: HCNC,CPTII,S$GLB, | Performed by: UROLOGY

## 2020-02-21 PROCEDURE — 1126F PR PAIN SEVERITY QUANTIFIED, NO PAIN PRESENT: ICD-10-PCS | Mod: HCNC,S$GLB,, | Performed by: UROLOGY

## 2020-02-21 PROCEDURE — 99284 EMERGENCY DEPT VISIT MOD MDM: CPT | Mod: 25,HCNC

## 2020-02-21 PROCEDURE — 93010 EKG 12-LEAD: ICD-10-PCS | Mod: HCNC,,, | Performed by: INTERNAL MEDICINE

## 2020-02-21 PROCEDURE — 1159F MED LIST DOCD IN RCRD: CPT | Mod: HCNC,S$GLB,, | Performed by: UROLOGY

## 2020-02-21 PROCEDURE — 1159F PR MEDICATION LIST DOCUMENTED IN MEDICAL RECORD: ICD-10-PCS | Mod: HCNC,S$GLB,, | Performed by: UROLOGY

## 2020-02-21 PROCEDURE — 82962 GLUCOSE BLOOD TEST: CPT | Mod: HCNC

## 2020-02-21 PROCEDURE — 36000 PLACE NEEDLE IN VEIN: CPT | Mod: HCNC

## 2020-02-21 PROCEDURE — 85025 COMPLETE CBC W/AUTO DIFF WBC: CPT | Mod: HCNC

## 2020-02-21 PROCEDURE — 93005 ELECTROCARDIOGRAM TRACING: CPT | Mod: HCNC

## 2020-02-21 PROCEDURE — 3078F PR MOST RECENT DIASTOLIC BLOOD PRESSURE < 80 MM HG: ICD-10-PCS | Mod: HCNC,CPTII,S$GLB, | Performed by: UROLOGY

## 2020-02-21 PROCEDURE — 99214 OFFICE O/P EST MOD 30 MIN: CPT | Mod: HCNC,S$GLB,, | Performed by: UROLOGY

## 2020-02-21 NOTE — TELEPHONE ENCOUNTER
Called patient, let her know to go to the ED due to K level being 7.7. She verbalized an understanding.

## 2020-02-21 NOTE — ED NOTES
unsuccessful attempted IV access with 22 to right hand. Pt asked RN to remove IV while trying to place. Will get another nurse to attempt

## 2020-02-21 NOTE — PROVIDER PROGRESS NOTES - EMERGENCY DEPT.
Encounter Date: 2/21/2020    ED Physician Progress Notes           3:12 PM EKG reading-normal sinus at 93 poor R-wave progression.  Nonspecific ST T wave abnormalities-no STEMI

## 2020-02-21 NOTE — ED PROVIDER NOTES
Encounter Date: 2/21/2020       History     Chief Complaint   Patient presents with    elvated potassium - drawn this am     referred to ER for elevated potassium drawn today. Deneis chest pain or weakness.      74-year-old female with a history of multiple cancers had routine lab work today after being discharged from the hospital and was found to have hyperkalemia.  Sent to the ED for further evaluation.  She feels well and denies any acute medical complaint.  Patient denies nausea, vomiting, diarrhea, fever, cough, shortness of breath, chest pain, abdominal pain, or dysuria.   assists HPI.  Patient was recently admitted to the hospital for urosepsis.  She is currently on antibiotics.  She is on oral chemotherapy for her lung cancer.  She has been eating and drinking normally with normal urine output.  A ten point review of systems was completed and is negative except as documented above.  Patient denies any other acute medical complaint.  The patients available PMH, PSH, Social History, medications, allergies, and triage vital signs were reviewed just prior to their medical evaluation.        Review of patient's allergies indicates:   Allergen Reactions    Tobradex [tobramycin-dexamethasone] Swelling    Iodinated contrast media Hives    Latex Rash and Hives    Phenytoin sodium extended Other (See Comments) and Rash     Past Medical History:   Diagnosis Date    Anticoagulant long-term use     Blood clot in vein 06/2016    Breast cancer 1994    Cataract     Hypertension     Lung cancer     Lung cancer metastatic to brain     Pancreatic cancer 1998    Posterior capsular opacification, left eye     Psychiatric problem     Seizures 01/25/2016    Stroke     Therapy      Past Surgical History:   Procedure Laterality Date    BONE BIOSPY  3/9/16    BRAIN SURGERY  1/12/16    tumor removal 1/12/16    BREAST LUMPECTOMY  1998    left    CATARACT EXTRACTION Bilateral 2004    yag OU     CHOLECYSTECTOMY      COLONOSCOPY N/A 2015    Procedure: COLONOSCOPY;  Surgeon: Alex Carmona MD;  Location: [facility] ENDO (4TH FLR);  Service: Endoscopy;  Laterality: N/A;  2 year f/u    COLONOSCOPY N/A 2018    Procedure: COLONOSCOPY;  Surgeon: Jim Raman MD;  Location: [facility] ENDO (4TH FLR);  Service: Endoscopy;  Laterality: N/A;  Eliquis - per Dr. Vazquez -ok to hold Eliquis x 2 days prior to colon- ERW    cysto and right ureteral stent  12    ecoli      removal of blockage, done throat, then through the liver.    HYSTERECTOMY      KIDNEY STONE SURGERY  --laser    left eswl  12    OOPHORECTOMY  2012    Laparoscopic BSO, lysis of adhesions, cystoscopy greater than 35mins     WHIPPLE PROCEDURE W/ LAPAROSCOPY       Family History   Problem Relation Age of Onset    Breast cancer Mother     Lung cancer Mother     Leukemia Paternal Grandfather     Stomach cancer Paternal Grandmother     Colon cancer Neg Hx     Ovarian cancer Neg Hx      Social History     Tobacco Use    Smoking status: Former Smoker     Packs/day: 0.00     Years: 0.00     Pack years: 0.00     Last attempt to quit: 1961     Years since quittin.4    Smokeless tobacco: Never Used   Substance Use Topics    Alcohol use: No    Drug use: No     Review of Systems   Constitutional: Negative for fever.   HENT: Negative for sore throat.    Eyes: Negative for visual disturbance.   Respiratory: Negative for cough and shortness of breath.    Cardiovascular: Negative for chest pain.   Gastrointestinal: Negative for abdominal pain, diarrhea, nausea and vomiting.   Genitourinary: Negative for dysuria.   Musculoskeletal: Negative for neck pain.   Skin: Negative for rash and wound.   Allergic/Immunologic: Negative for immunocompromised state.   Neurological: Negative for syncope.   Psychiatric/Behavioral: Negative for confusion.       Physical Exam     Initial Vitals [20 1504]   BP Pulse  Resp Temp SpO2   (!) 165/84 95 18 98.6 °F (37 °C) 98 %      MAP       --         Physical Exam    Nursing note and vitals reviewed.  Constitutional: She appears well-developed and well-nourished. She is not diaphoretic. No distress.   HENT:   Head: Normocephalic and atraumatic.   Nose: Nose normal.   Eyes: Conjunctivae are normal. Right eye exhibits no discharge. Left eye exhibits no discharge.   Neck: Normal range of motion. Neck supple.   Cardiovascular: Normal rate, regular rhythm and normal heart sounds. Exam reveals no gallop and no friction rub.    No murmur heard.  Pulmonary/Chest: Breath sounds normal. No respiratory distress. She has no wheezes. She has no rhonchi. She has no rales.   Abdominal: Soft. She exhibits no distension. There is no tenderness. There is no rebound and no guarding.   Musculoskeletal: Normal range of motion. She exhibits no edema or tenderness.   Neurological: She is alert and oriented to person, place, and time. GCS score is 15. GCS eye subscore is 4. GCS verbal subscore is 5. GCS motor subscore is 6.   Skin: Skin is warm and dry. No rash noted. No erythema.   Psychiatric: She has a normal mood and affect. Her behavior is normal. Judgment and thought content normal.         ED Course   Procedures  Labs Reviewed   CBC W/ AUTO DIFFERENTIAL - Abnormal; Notable for the following components:       Result Value    RBC 3.43 (*)     Hemoglobin 8.7 (*)     Hematocrit 30.3 (*)     Mean Corpuscular Hemoglobin 25.4 (*)     Mean Corpuscular Hemoglobin Conc 28.7 (*)     RDW 19.7 (*)     All other components within normal limits   BASIC METABOLIC PANEL - Abnormal; Notable for the following components:    Potassium 5.2 (*)     Chloride 116 (*)     CO2 17 (*)     Creatinine 1.5 (*)     eGFR if  39.0 (*)     eGFR if non  33.8 (*)     All other components within normal limits   ISTAT PROCEDURE - Abnormal; Notable for the following components:    POC BUN 32 (*)     POC  Creatinine 1.6 (*)     POC Potassium 5.8 (*)     POC Chloride 118 (*)     POC TCO2 (MEASURED) 17 (*)     POC Ionized Calcium 0.93 (*)     POC Hematocrit 30 (*)     All other components within normal limits   ISTAT CHEM8        ECG Results          EKG 12-lead (Final result)  Result time 02/21/20 18:13:30    Final result by Interface, Lab In [facility] (02/21/20 18:13:30)                 Narrative:    Test Reason : E87.5,    Vent. Rate : 093 BPM     Atrial Rate : 093 BPM     P-R Int : 130 ms          QRS Dur : 078 ms      QT Int : 344 ms       P-R-T Axes : 067 -15 056 degrees     QTc Int : 427 ms    Normal sinus rhythm  Low sepal forces  Abnormal ECG  When compared with ECG of 17-FEB-2020 02:46,  Questionable change in initial forces of Septal leads  Confirmed by Trip SALGADO MD ([id number]) on 2/21/2020 6:13:23 PM    Referred By:             Confirmed By:Trip SALGADO MD                            Imaging Results    None          Medical Decision Making:   History:   I obtained history from: someone other than patient.       <> Summary of History:  assists HPI  Old Medical Records: I decided to obtain old medical records.  Old Records Summarized: records from previous admission(s).  Clinical Tests:   Lab Tests: Ordered and Reviewed  Medical Tests: Ordered and Reviewed  ED Management:  74-year-old female actively being treated for lung cancer presents with elevated potassium on a lab draw this morning.  Vitals here with hypertension.  Physical exam normal. Labs here at her baseline.  K 5.2.  ECG normal.  Doubt clinically significant hyperkalemia.  No indications for admission or shifting.  Will discharge home with close outpatient follow-up.  She will call her doctor on Monday to arrange.  Patient will return to ED for worsening symptoms, inability to eat/drink, fever greater than 100.4, or any other concerns.  Did bedside teaching with return precautions.  All questions answered.  The patient acknowledges understanding.   Gave verbal discharge instructions.                                 Clinical Impression:   Final diagnoses:  [E87.5] Serum potassium elevated (Primary)    Disposition:   Disposition: Discharged  Condition: Stable                     David Odonnell MD  02/21/20 1277

## 2020-02-21 NOTE — ED TRIAGE NOTES
Naty St, a 74 y.o. female presents to the ED w/ complaint of elevated potassium. Pt reports she was sent here from her doctors office with a K of 7    Triage note:  Chief Complaint   Patient presents with    elvated potassium - drawn this am     referred to ER for elevated potassium drawn today. Deneis chest pain or weakness.      Review of patient's allergies indicates:   Allergen Reactions    Tobradex [tobramycin-dexamethasone] Swelling    Iodinated contrast media Hives    Latex Rash and Hives    Phenytoin sodium extended Other (See Comments) and Rash     Past Medical History:   Diagnosis Date    Anticoagulant long-term use     Blood clot in vein 06/2016    Breast cancer 1994    Cataract     Hypertension     Lung cancer     Lung cancer metastatic to brain     Pancreatic cancer 1998    Posterior capsular opacification, left eye     Psychiatric problem     Seizures 01/25/2016    Stroke     Therapy

## 2020-02-21 NOTE — PLAN OF CARE
Future Appointments   Date Time Provider Department Center   2/21/2020 12:30 PM LAB, [facility] CANCER BLDG [facility] [place]   2/21/2020  1:20 PM Jean Carlos Vela MD [facility] UROLOGY [facility]   2/21/2020  3:00 PM Devika Chappell PA-C [facility] HEM ONC [place]   3/3/2020 11:00 AM Kiya Landin, NP [address] [facility]   3/18/2020  8:50 AM LAB, [facility] CANCER BLDG [facility] [place]   3/18/2020 10:00 AM Devika Chappell PA-C [facility] HEM ONC [place]   3/18/2020 10:30 AM INJECTION, [facility] INF[facility]ION [facility] [place]   3/19/2020  1:30 PM Nel Ramirez, [address]ENIFER [address] [facility]   3/27/2020 10:00 AM PALLIATIVE AND SUPPORT CARE-HEMOC [facility] HEM ONC [place]   4/23/2020  9:30 AM [facility] PET CT LIMIT 500 LBS [facility] PET CT [facility]   4/30/2020  8:30 AM LAB, [facility] CANCER BLDG [facility] [place]   4/30/2020  9:40 AM Karrie Vazquez MD [facility] HEM ONC [place]   4/30/2020 10:00 AM INJECTION, [facility] INFUSION [facility] CHEMO [place]     Rina Sanchez, RN, BSN, CM  Utilization Management  [facility]

## 2020-02-22 LAB — BACTERIA BLD CULT: NORMAL

## 2020-02-24 ENCOUNTER — HOSPITAL ENCOUNTER (EMERGENCY)
Facility: HOSPITAL | Age: 75
Discharge: HOME | End: 2020-02-24
Attending: EMERGENCY MEDICINE
Payer: MEDICARE

## 2020-02-24 ENCOUNTER — TELEPHONE (OUTPATIENT)
Dept: HEMATOLOGY/ONCOLOGY | Facility: CLINIC | Age: 75
End: 2020-02-24

## 2020-02-24 ENCOUNTER — DOCUMENTATION ONLY (OUTPATIENT)
Dept: HEMATOLOGY/ONCOLOGY | Facility: CLINIC | Age: 75
End: 2020-02-24

## 2020-02-24 VITALS
HEART RATE: 93 BPM | WEIGHT: 136 LBS | RESPIRATION RATE: 18 BRPM | BODY MASS INDEX: 22.66 KG/M2 | HEIGHT: 65 IN | OXYGEN SATURATION: 95 % | TEMPERATURE: 98 F | SYSTOLIC BLOOD PRESSURE: 137 MMHG | DIASTOLIC BLOOD PRESSURE: 87 MMHG

## 2020-02-24 DIAGNOSIS — R53.1 WEAKNESS: Primary | ICD-10-CM

## 2020-02-24 DIAGNOSIS — I63.9 STROKE: ICD-10-CM

## 2020-02-24 DIAGNOSIS — N17.9 AKI (ACUTE KIDNEY INJURY): ICD-10-CM

## 2020-02-24 DIAGNOSIS — C79.9 METASTATIC CANCER: ICD-10-CM

## 2020-02-24 DIAGNOSIS — E83.52 HYPERCALCEMIA: ICD-10-CM

## 2020-02-24 LAB
ALBUMIN SERPL BCP-MCNC: 3.2 G/DL (ref 3.5–5.2)
ALP SERPL-CCNC: 486 U/L (ref 55–135)
ALT SERPL W/O P-5'-P-CCNC: 65 U/L (ref 10–44)
ANION GAP SERPL CALC-SCNC: 13 MMOL/L (ref 8–16)
AST SERPL-CCNC: 44 U/L (ref 10–40)
BASOPHILS # BLD AUTO: 0.02 K/UL (ref 0–0.2)
BASOPHILS NFR BLD: 0.3 % (ref 0–1.9)
BILIRUB SERPL-MCNC: 0.6 MG/DL (ref 0.1–1)
BUN SERPL-MCNC: 35 MG/DL (ref 8–23)
CALCIUM SERPL-MCNC: 11.1 MG/DL (ref 8.7–10.5)
CHLORIDE SERPL-SCNC: 106 MMOL/L (ref 95–110)
CHOLEST SERPL-MCNC: 173 MG/DL (ref 120–199)
CHOLEST/HDLC SERPL: 2.9 {RATIO} (ref 2–5)
CO2 SERPL-SCNC: 21 MMOL/L (ref 23–29)
CREAT SERPL-MCNC: 1.8 MG/DL (ref 0.5–1.4)
CREAT SERPL-MCNC: 1.9 MG/DL (ref 0.5–1.4)
DIFFERENTIAL METHOD: ABNORMAL
EOSINOPHIL # BLD AUTO: 0.1 K/UL (ref 0–0.5)
EOSINOPHIL NFR BLD: 1.9 % (ref 0–8)
ERYTHROCYTE [DISTWIDTH] IN BLOOD BY AUTOMATED COUNT: 17.2 % (ref 11.5–14.5)
EST. GFR  (AFRICAN AMERICAN): 31.3 ML/MIN/1.73 M^2
EST. GFR  (NON AFRICAN AMERICAN): 27.1 ML/MIN/1.73 M^2
GLUCOSE SERPL-MCNC: 95 MG/DL (ref 70–110)
HCT VFR BLD AUTO: 33 % (ref 37–48.5)
HDLC SERPL-MCNC: 60 MG/DL (ref 40–75)
HDLC SERPL: 34.7 % (ref 20–50)
HGB BLD-MCNC: 9.9 G/DL (ref 12–16)
IMM GRANULOCYTES # BLD AUTO: 0.02 K/UL (ref 0–0.04)
IMM GRANULOCYTES NFR BLD AUTO: 0.3 % (ref 0–0.5)
INR PPP: 1 (ref 0.8–1.2)
LDLC SERPL CALC-MCNC: 96.4 MG/DL (ref 63–159)
LYMPHOCYTES # BLD AUTO: 2.2 K/UL (ref 1–4.8)
LYMPHOCYTES NFR BLD: 28.5 % (ref 18–48)
MCH RBC QN AUTO: 25.6 PG (ref 27–31)
MCHC RBC AUTO-ENTMCNC: 30 G/DL (ref 32–36)
MCV RBC AUTO: 86 FL (ref 82–98)
MONOCYTES # BLD AUTO: 0.7 K/UL (ref 0.3–1)
MONOCYTES NFR BLD: 8.9 % (ref 4–15)
NEUTROPHILS # BLD AUTO: 4.5 K/UL (ref 1.8–7.7)
NEUTROPHILS NFR BLD: 60.1 % (ref 38–73)
NONHDLC SERPL-MCNC: 113 MG/DL
NRBC BLD-RTO: 0 /100 WBC
PLATELET # BLD AUTO: 287 K/UL (ref 150–350)
PMV BLD AUTO: 11.6 FL (ref 9.2–12.9)
POC PTINR: 1.2 (ref 0.9–1.2)
POC PTWBT: 13.9 SEC (ref 9.7–14.3)
POTASSIUM SERPL-SCNC: 4.5 MMOL/L (ref 3.5–5.1)
PROT SERPL-MCNC: 7.4 G/DL (ref 6–8.4)
PROTHROMBIN TIME: 10.8 SEC (ref 9–12.5)
RBC # BLD AUTO: 3.86 M/UL (ref 4–5.4)
SAMPLE: ABNORMAL
SAMPLE: NORMAL
SODIUM SERPL-SCNC: 140 MMOL/L (ref 136–145)
TRIGL SERPL-MCNC: 83 MG/DL (ref 30–150)
TSH SERPL DL<=0.005 MIU/L-ACNC: 2.56 UIU/ML (ref 0.4–4)
WBC # BLD AUTO: 7.54 K/UL (ref 3.9–12.7)

## 2020-02-24 PROCEDURE — 85025 COMPLETE CBC W/AUTO DIFF WBC: CPT | Mod: HCNC

## 2020-02-24 PROCEDURE — 80061 LIPID PANEL: CPT | Mod: HCNC

## 2020-02-24 PROCEDURE — 25500020 PHARM REV CODE 255: Mod: HCNC | Performed by: EMERGENCY MEDICINE

## 2020-02-24 PROCEDURE — 99291 PR CRITICAL CARE, E/M 30-74 MINUTES: ICD-10-PCS | Mod: HCNC,,, | Performed by: EMERGENCY MEDICINE

## 2020-02-24 PROCEDURE — 85610 PROTHROMBIN TIME: CPT | Mod: HCNC

## 2020-02-24 PROCEDURE — 82962 GLUCOSE BLOOD TEST: CPT | Mod: HCNC

## 2020-02-24 PROCEDURE — 63600175 PHARM REV CODE 636 W HCPCS: Mod: HCNC | Performed by: PHYSICIAN ASSISTANT

## 2020-02-24 PROCEDURE — 93005 ELECTROCARDIOGRAM TRACING: CPT | Mod: HCNC

## 2020-02-24 PROCEDURE — 99285 EMERGENCY DEPT VISIT HI MDM: CPT | Mod: 25,HCNC

## 2020-02-24 PROCEDURE — 93010 ELECTROCARDIOGRAM REPORT: CPT | Mod: HCNC,,, | Performed by: INTERNAL MEDICINE

## 2020-02-24 PROCEDURE — 99284 EMERGENCY DEPT VISIT MOD MDM: CPT | Mod: HCNC,GC,, | Performed by: PSYCHIATRY & NEUROLOGY

## 2020-02-24 PROCEDURE — 80053 COMPREHEN METABOLIC PANEL: CPT | Mod: HCNC

## 2020-02-24 PROCEDURE — 93010 EKG 12-LEAD: ICD-10-PCS | Mod: HCNC,,, | Performed by: INTERNAL MEDICINE

## 2020-02-24 PROCEDURE — 82565 ASSAY OF CREATININE: CPT | Mod: HCNC

## 2020-02-24 PROCEDURE — 84443 ASSAY THYROID STIM HORMONE: CPT | Mod: HCNC

## 2020-02-24 PROCEDURE — 82565 ASSAY OF CREATININE: CPT | Mod: HCNC,91

## 2020-02-24 PROCEDURE — 99284 PR EMERGENCY DEPT VISIT,LEVEL IV: ICD-10-PCS | Mod: HCNC,GC,, | Performed by: PSYCHIATRY & NEUROLOGY

## 2020-02-24 PROCEDURE — 99291 CRITICAL CARE FIRST HOUR: CPT | Mod: HCNC,,, | Performed by: EMERGENCY MEDICINE

## 2020-02-24 PROCEDURE — 94761 N-INVAS EAR/PLS OXIMETRY MLT: CPT | Mod: HCNC

## 2020-02-24 PROCEDURE — 96360 HYDRATION IV INFUSION INIT: CPT | Mod: HCNC

## 2020-02-24 RX ADMIN — SODIUM CHLORIDE 1000 ML: 0.9 INJECTION, SOLUTION INTRAVENOUS at 01:02

## 2020-02-24 RX ADMIN — IOHEXOL 75 ML: 350 INJECTION, SOLUTION INTRAVENOUS at 11:02

## 2020-02-24 NOTE — PLAN OF CARE
(SW) consulted to discuss transition to hospice care due to Pt's repeated statements to medical staff that she did not want anymore hospital admissions. Per medical team, Pt had a Palliative Medicine consult during a previous admission.    SW met with Pt, Pt's spouse, and Pt's son at bedside to discuss discharge plan. Pt alert and oriented. SW introduced herself to family and explained her role. SW informed Pt and family that it was her understanding that they were interested in transitioning to hospice care. Pt and family appeared confused and asked SW to define hospice because they reported that they did not know what it was; SW obliged. After SW described comfort care and the goal of hospice, Pt and family reported that they do not want hospice at this time. Pt told SW that she would like to resume Home Health services instead and asked SW to describe the difference between caregiver services and Home Health; SW discussed at length. SW informed Pt and family that she would reach out to the oncology Social Worker and inform them of Pt's needs.     · SW paged Oncology SW, Jossy Griggs.    · JESSENIA spoke with [place] at [facility] who reported that they are familiar with Pt and confirmed that Pt was receiving services at one time, but discontinued. [place] recommended that SW speak with Oncology SW to request  Orders.     · SW met with Pt and spouse at bedside and informed them that she would continue to reach out to the Oncology SW and advised them to follow-up with Dr. Vazquez's office as well. Pt and spouse expressed understanding.     SW informed ED team of above.     UPDATE 5:44 PM  SW spoke with Oncology SW, Jossy Griggs, and discussed case. Jossy reported that she will reach out to Oncology Team and inform them of Pt's request for Home Health services.       Niru Miller, [facility]  [phone]

## 2020-02-24 NOTE — TELEPHONE ENCOUNTER
Spoke with patient's son, shirley. He is calling to speak with dr sullivan, as he feels someone is missing something, as his mom is back in the ED r/t being found on the bathroom floor this morning and aphasia.   Son states he doesn't want to go to patient relations, but he keeps repeating something is being missed with his mom. He is requesting to speak with dr sullivan on Thursday or any physician covering for her sooner than then.       Message routed to dr sullivan (looks like they are working her up for Stroke.)

## 2020-02-24 NOTE — DISCHARGE INSTRUCTIONS
Future Appointments   Date Time Provider Department Center   3/3/2020 11:00 AM Kiya Landin, NP [facility] [place]   3/5/2020  1:00 PM JULIO, UROLOGY [facility] CYSTO[facility]   3/18/2020  8:50 AM LAB, [facility] CANCER BLDG [facility] [place]   3/18/2020 10:00 AM Devika Chappell PA-C [facility] HEM ONC [place]   3/18/2020 10:30 AM INJECTION, [facility] INFUSION [facility] CHEMO [place]   3/19/2020  1:30 PM Nel Ramirez, NP [facility] [place]   3/27/2020 10:00 AM PALLIATIVE AND SUPPORT CARE-HEMOC [facility] HEM ONC [place]   4/23/2020  9:30 AM [facility] PET CT LIMIT 500 LBS [facility] PET CT [facility]   4/30/2020  8:30 AM LAB, [facility]C CANCER BLDG [facility] [place]   4/30/2020  9:40 AM Karrie Vazquez MD [facility] HEM ONC [place]   4/30/2020 10:00 AM INJECTION, [facility] INFUSION [facility] CHE[facility] [place]

## 2020-02-24 NOTE — TELEPHONE ENCOUNTER
----- Message from Josie Denson sent at 2/24/2020 11:36 AM CST -----  Contact: patient son shirley  [phone]-please call above patient eddie watson at number in message said patient is back in the hospital need to speak with the nurse to find out what is going on waiting on a call back thanks.

## 2020-02-24 NOTE — ED PROVIDER NOTES
"Encounter Date: 2/24/2020       History     Chief Complaint   Patient presents with    Aphasia     LSN 2am     73 yo F with multiple medical comorbidities significant for recent admission for sepsis, metastatic R lung adenocarcinoma on Xgeva and Tarceva, metastatic lung cancer to brain, breast ca s/p L lumpectomy, meningioma s/p resection, pancreatic ca s/p whipple, HTN presents to the ED for evaluation of aphasia, weakness and falls.      History was provided partially by EMS, patient's son and somewhat from patient.  I also discussed history with the patient's  over the phone.  Patient's  states that he heard the patient fall from the toilet around 2a.m..  He found her on the ground sitting up.  She was unable to get up due to severe weakness.  reports 4-5 falls since then.  He does not believe that she injured her head as he found her each time sitting on her bottom.  Patient states that the patient was walking and talking normally last night.  He states that he did NOT notice any changes in the patient's speech this morning.  He states that the EMS noted that the patient had aphasia.  He notes that the patient was able to state [place] I do not want to go to the hospital" prior to EMS arrival. He does state that pt has had some difficulty with word finding since a stroke a few years ago, but he and her doctors are able to understand her speech. Of note, patient was discharged on 2/19 after hospitalization for sepsis and confusion.   denies any other abnormal symptoms since discharge.  Specifically denies fevers, vomiting, diarrhea.            Review of patient's allergies indicates:   Allergen Reactions    Tobradex [tobramycin-dexamethasone] Swelling    Iodinated contrast media Hives    Latex Rash and Hives    Phenytoin sodium extended Other (See Comments) and Rash     Past Medical History:   Diagnosis Date    Anticoagulant long-term use     Blood clot in vein 06/2016    Breast " cancer 1994    Cataract     Hypertension     Lung cancer     Lung cancer metastatic to brain     Pancreatic cancer     Posterior capsular opacification, left eye     Psychiatric problem     Seizures 2016    Stroke     Therapy      Past Surgical History:   Procedure Laterality Date    BONE BIOSPY  3/9/16    BRAIN SURGERY  16    tumor removal 16    BREAST LUMPECTOMY  1998    left    CATARACT EXTRACTION Bilateral 2004    yag OU    CHOLECYSTECTOMY      COLONOSCOPY N/A 2015    Procedure: COLONOSCOPY;  Surgeon: Alex Carmona MD;  Location: [facility] ENDO (4TH FLR);  Service: Endoscopy;  Laterality: N/A;  2 year f/u    COLONOSCOPY N/A 2018    Procedure: COLONOSCOPY;  Surgeon: Jim Raman MD;  Location: [facility] ENDO (4TH FLR);  Service: Endoscopy;  Laterality: N/A;  Eliquis - per Dr. Vazquez -ok to hold Eliquis x 2 days prior to colon- ERW    cysto and right ureteral stent  12    ecoli      removal of blockage, done throat, then through the liver.    HYSTERECTOMY  1974    KIDNEY STONE SURGERY  --laser    left eswl  12    OOPHORECTOMY  2012    Laparoscopic BSO, lysis of adhesions, cystoscopy greater than 35mins     WHIPPLE PROCEDURE W/ LAPAROSCOPY       Family History   Problem Relation Age of Onset    Breast cancer Mother     Lung cancer Mother     Leukemia Paternal Grandfather     Stomach cancer Paternal Grandmother     Colon cancer Neg Hx     Ovarian cancer Neg Hx      Social History     Tobacco Use    Smoking status: Former Smoker     Packs/day: 0.00     Years: 0.00     Pack years: 0.00     Last attempt to quit: 1961     Years since quittin.4    Smokeless tobacco: Never Used   Substance Use Topics    Alcohol use: No    Drug use: No     Review of Systems   Unable to perform ROS: Other (Aphasia)       Physical Exam     Initial Vitals [20 1122]   BP Pulse Resp Temp SpO2   121/84 80 (!) 22 97.5 °F (36.4 °C) 95 %       MAP       --         Physical Exam    Nursing note and vitals reviewed.  Constitutional: She appears well-developed and well-nourished. She is not diaphoretic.  Non-toxic appearance. She does not appear ill. No distress.   HENT:   Head: Normocephalic and atraumatic.   Eyes: EOM are normal.   Neck: Neck supple.   Cardiovascular: Normal rate and regular rhythm. Exam reveals no gallop and no friction rub.    No murmur heard.  Pulmonary/Chest: Effort normal and breath sounds normal. No accessory muscle usage. No tachypnea. No respiratory distress. She has no decreased breath sounds. She has no wheezes. She has no rhonchi. She has no rales.   Abdominal: Soft. Normal appearance. She exhibits no distension and no mass. There is no tenderness. There is no guarding.   Neurological: She is alert.   + expressive aphasia.  No extremity drift.   Skin: No rash noted.   Psychiatric: She has a normal mood and affect. Her behavior is normal.         ED Course   Procedures  Labs Reviewed   CBC W/ AUTO DIFFERENTIAL - Abnormal; Notable for the following components:       Result Value    RBC 3.86 (*)     Hemoglobin 9.9 (*)     Hematocrit 33.0 (*)     Mean Corpuscular Hemoglobin 25.6 (*)     Mean Corpuscular Hemoglobin Conc 30.0 (*)     RDW 17.2 (*)     All other components within normal limits   COMPREHENSIVE METABOLIC PANEL - Abnormal; Notable for the following components:    CO2 21 (*)     BUN, Bld 35 (*)     Creatinine 1.8 (*)     Calcium 11.1 (*)     Albumin 3.2 (*)     Alkaline Phosphatase 486 (*)     AST 44 (*)     ALT 65 (*)     eGFR if  31.3 (*)     eGFR if non  27.1 (*)     All other components within normal limits   ISTAT CREATININE - Abnormal; Notable for the following components:    POC Creatinine 1.9 (*)     All other components within normal limits   PROTIME-INR   TSH   LIPID PANEL   ISTAT PROCEDURE   POCT GLUCOSE        ECG Results          ECG 12 lead (Final result)  Result time 02/24/20  11:54:55    Final result by Interface, [facility] (02/24/20 11:54:55)                 Narrative:    Test Reason : I63.9,    Vent. Rate : 092 BPM     Atrial Rate : 092 BPM     P-R Int : 138 ms          QRS Dur : 080 ms      QT Int : 354 ms       P-R-T Axes : 059 -05 036 degrees     QTc Int : 437 ms    Normal sinus rhythm  Minimal voltage criteria for LVH, may be normal variant  Borderline Abnormal ECG  When compared with ECG of 21-FEB-2020 15:11,  Criteria for Septal infarct are no longer Present  Confirmed by Trip SALGADO MD ([id number]) on 2/24/2020 11:54:45 AM    Referred By: System System           Confirmed By:Trip SALGADO MD                            Imaging Results          MRI Brain Without Contrast (Final result)  Result time 02/24/20 13:13:55   Procedure changed from MRI Brain W WO Contrast     Final result by Alexis Bansal DO (02/24/20 13:13:55)                 Impression:      No acute intracranial findings specifically without evidence for acute infarction or new parenchymal signal abnormality.  Remote operative change bifrontal craniotomy with left frontal encephalomalacia.  T2 flair signal hyperintensity in the left frontal lobe suggestive for gliosis and scarring similar to prior.    Continued right cerebellar encephalomalacia.    Please note evaluation for residual recurrent metastatic disease is limited by lack of contrast.  However no significant new parenchymal signal abnormality.    Please see above for additional details.      Electronically signed by: Alexis Bansal DO  Date:    02/24/2020  Time:    13:13             Narrative:    EXAMINATION:  MRI BRAIN WITHOUT CONTRAST    CLINICAL HISTORY:  Stroke;Malignancy, concern for new mets;    TECHNIQUE:  Sagittal and axial T1, axial T2, axial FLAIR, axial gradient and axial diffusion imaging of the whole brain without contrast.    COMPARISON:  CT multiphase 02/24/2020 and MRI 11/26/2019    FINDINGS:  Postoperative change status post bifrontal parietal  craniotomy with moderate underlying left frontal encephalomalacia again seen.  T2 flair signal hyperintensity allowing brain parenchyma suggestive for gliosis and scarring.    In addition there is continued moderate right cerebellar encephalomalacia.  Evaluation for residual recurrent lesion limited by lack of contrast.  Ventricles are stable without hydrocephalus.  Major intracranial T2 flow voids are present.  Postoperative change from bilateral cataract repair.  There is no restricted diffusion to suggest acute infarction.  There is punctate susceptibility underlying the left frontal craniotomy and within the left frontal resection cavity compatible with remote postoperative blood products.    .                               X-Ray Chest AP Portable (Final result)  Result time 02/24/20 12:45:02    Final result by Sumit Mueller III, MD (02/24/20 12:45:02)                 Impression:      No acute process seen.      Electronically signed by: Sumit Mueller MD  Date:    02/24/2020  Time:    12:45             Narrative:    EXAMINATION:  XR CHEST AP PORTABLE    CLINICAL HISTORY:  Stroke;    FINDINGS:  One view: Heart size is normal.  Lungs are clear.  The bones show no abnormality.  There is aortic plaque.                               CTA STROKE MULTI-PHASE (Final result)  Result time 02/24/20 12:04:04    Final result by Alexis Bansal DO (02/24/20 12:04:04)                 Impression:      CTA head and neck: Unremarkable CTA of the head and neck specifically without evidence for proximal significant stenosis or occlusion.    Continued consolidative opacity in the right suprahilar region overall indeterminate which may represents scarring atelectasis underlying neoplasm not excluded.    CT head: Operative change status post bifrontal craniotomy with underlying encephalomalacia left frontal lobe similar to prior MRI.    There is additional encephalomalacia right cerebellum.  No evidence for acute intracranial  hemorrhage or sulcal effacement to suggest large territory recent infarction.    Moderate patchy paranasal sinus opacities.    Please see above for additional details.    Further evaluation as warranted clinically.      Electronically signed by: Alexis Bansal DO  Date:    02/24/2020  Time:    12:04             Narrative:    EXAMINATION:  CTA STROKE MULTI-PHASE    CLINICAL HISTORY:  Stroke;    TECHNIQUE:  5 mm axial images of the head pre and post contrast with 0.625 mm axial CTA images of the head neck post-contrast.  Coronal and sagittal MPR and MIP imaging was performed 100 ml of Omnipaque 350 contrast was injected intravenously    COMPARISON:  MRI brain 11/26/2019 and 08/26/2019 and prior CT thorax 01/17/2020    FINDINGS:  CT head with and  without contrast: Remote operative change status post bifrontal craniotomy for left frontal mass resection.  There is slight volume loss and hypoattenuation in the left frontal lobe corresponding to configuration seen on prior MRI suggestive for encephalomalacia and sequela of prior lesion resection.  There is additional area of a moderate encephalomalacia in the right cerebellum similar to prior MRI allowing for differences in technique.  There is no evidence for acute intracranial hemorrhage or sulcal effacement to suggest large territory recent infarction allowing for slight limitation by patient motion distortion.    There is moderate lobular opacities in the maxillary antra bilaterally greater on the right with partial ethmoid air cell opacities.    CTA head:    Anterior circulation: The bilateral distal cervical, petrous, cavernous, and supraclinoid segments of the ICAs are patent without significant focal stenosis or aneurysm.    The anterior middle cerebral arteries are patent without focal stenosis or aneurysm there is hypoplasia of the right SARTHAK likely developmental variant.    Posterior circulation: Distal vertebral arteries, basilar artery and posterior cerebral  arteries are patent.  There is hypoplasia of the right P1 segment of the PCA with essentially persistent fetal circulation right PCA via right posterior communicating artery.  There is a small probable fenestration in the proximal basilar artery.  No high-grade focal stenosis or occlusion.    CTA neck: Visualized arch and origin the great vessels from the arch are within normal limits.  The left subclavian also arises from the arch which is within normal limits.  The right vertebral artery origin from the right subclavian arteries within normal limits.  The left vertebral artery is diminutive throughout its course.  The right vertebral artery is dominant.  Vertebral arteries are patent throughout their course without focal stenosis..    .    Right carotid: The right common carotid artery, carotid bifurcation and extracranial portions of the internal carotid artery are patent without significant focal stenosis.    Left carotid: The left common carotid artery, carotid bifurcation and extracranial portions of the internal carotid arteries are patent without significant focal stenosis.    There is less than 50% stenosis of the proximal ICAs by NASCET criteria.    Pharynx/larynx: Evaluation distorted by patient motion.  Allowing for distortion the nasopharynx, oropharynx, hypopharynx larynx and proximal trachea are within normal limits    Glands: Bilateral parotid and submandibular glands are within normal limits. Thyroid gland is unremarkable.    No evidence for adenopathy throughout the neck by size criteria.    There is stable opacification within the right suprahilar region surrounding the bronchovascular interstitium this measures approximately 2.5 cm in greatest transverse dimension.  Overall indeterminate and may represent scarring with superimposed probable atelectasis extending to the right lung apex.  There is no new lung opacity.  Clinical correlation advised.    Case discussed with Dr. Lr 02/24/2020 at  12:00                                 Medical Decision Making:   History:   Old Medical Records: I decided to obtain old medical records.  Differential Diagnosis:   My differential diagnosis includes but is not limited to:  CVA, encephalopathy, brain metastasis, sepsis, electrolyte abnormality  Clinical Tests:   Lab Tests: Ordered  Radiological Study: Ordered  Medical Tests: Ordered  Other:   I have discussed this case with another health care provider.       <> Summary of the Discussion: Vascular Neurology, oncology       APC / Resident Notes:   74-year-old female with history of metastatic cancer to multiple sites presents for evaluation of generalized weakness and aphasia.  On exam, patient is aphasic, but is able to cooperate with exam follow commands.  No other focal neuro deficits.  She has good strength to bilateral upper and lower extremities.  Vitals stable.  A stroke code was called.  Vascular Neurology Service evaluated the patient at bedside promptly upon arrival.  Stroke workup including CT a multiphase and MRI brain revealed no evidence of acute stroke.  There was limited evaluation of brain metastasis as MRI was done without contrast.  Labs were remarkable for a mild STEFAN and hypercalcemia at 11.1, which is new.  Patient received IV fluids in the ED.  Upon reassessment, aphasia seems to have resolved.  Patient is speaking in clear sentences.  She is requesting to be discharged.  Per chart review, patient had a consultation with palliative Care Medicine at her recent hospital admission.  I discussed this briefly with oncology.  They noted that patient and her family were not ready for hospice at this time.  Plan was for an outpatient palliative care follow-up.   Per my discussion with the patient in the ED, my impression was that patient was willing to read discussed the option of hospice.   discussed this with the patient and spouse who again expressed that they were not ready for hospice  at this time.  Please see separate  note.  I will discharge patient in stable condition.  Advised close follow-up with Oncology if symptoms continue.  I do not feel that patient requires hospital admission for hypercalcemia.    I have reviewed the patient's records and discussed this case with my supervising physician. Please be advised that this text was dictated with [employer] software and may contain dictation errors.                                   Clinical Impression:       ICD-10-CM ICD-9-CM   1. Weakness R53.1 780.79   2. Stroke I63.9 434.91   3. Hypercalcemia E83.52 275.42   4. Metastatic cancer C79.9 199.1   5. STEFAN (acute kidney injury) N17.9 584.9         Disposition:   Disposition: Discharged  Condition: Stable                     Kavitha Portillo PA-C  02/25/20 1202

## 2020-02-24 NOTE — ED NOTES
Bed: 02  Expected date: 2/24/20  Expected time: 11:17 AM  Means of arrival:   Comments:  EMS STROKE

## 2020-02-24 NOTE — ED NOTES
Patient is agitated and states she wants to leave and does not want to be admitted.  MD notified.

## 2020-02-24 NOTE — ED NOTES
Per the son, patient was found on the floor in the bathroom by her  around 0200.  Patient has had multiple falls.  Patient found with symptoms around 0200.  Discharged recently from the hospital.

## 2020-02-25 LAB — POCT GLUCOSE: 92 MG/DL (ref 70–110)

## 2020-02-25 NOTE — CONSULTS
[facility]  Vascular Neurology  Comprehensive Stroke Center  Consult Note    Consults  Assessment/Plan:     Metabolic encephalopathy  -MRI Brain with no acute stroke, L frontal encephalomalacia and previous R cerebellar infarct noted  -Concern for metabolic encephalopathy causing difficulty with speech, possible recrudescence   -Hx of DVT on apixaban, LDL at goal without use of statin, no other clear stroke risk factors apart from malignancy  -Hypercalcemia, STEFAN, recent febrile illness may all be contributing to presentation   -Pt declines further work up, has been in conversations with Palliative Care    STROKE DOCUMENTATION     Acute Stroke Times   Last Known Normal Date: 02/24/20  Last Known Normal Time: 0200  Symptom Onset Date: 02/24/20  Symptom Onset Time: 0200  Stroke Team Called Date: 02/24/20  Stroke Team Called Time: 1127  Stroke Team Arrival Date: 02/24/20  Stroke Team Arrival Time: 1129  CT Interpretation Time: 2350  Decision to Treat Time for Alteplase: (Not a candidate)  Decision to Treat Time for IR: (Not a candidate)    NIH Scale:  Interval: baseline  1a. Level of Consciousness: 0-->Alert, keenly responsive  1b. LOC Questions: 0-->Answers both questions correctly  1c. LOC Commands: 0-->Performs both tasks correctly  2. Best Gaze: 0-->Normal  3. Visual: 0-->No visual loss  4. Facial Palsy: 0-->Normal symmetrical movements  5a. Motor Arm, Left: 0-->No drift, limb holds 90 (or 45) degrees for full 10 secs  5b. Motor Arm, Right: 0-->No drift, limb holds 90 (or 45) degrees for full 10 secs  6a. Motor Leg, Left: 0-->No drift, leg holds 30 degree position for full 5 secs  6b. Motor Leg, Right: 0-->No drift, leg holds 30 degree position for full 5 secs  7. Limb Ataxia: 0-->Absent  8. Sensory: 0-->Normal, no sensory loss  9. Best Language: 1-->Mild-to-moderate aphasia, some obvious loss of fluency or facility of comprehension, without significant limitation on ideas expressed or form of  expression. Reduction of speech and/or comprehension, however, makes conversation. . . (see row details)  10. Dysarthria: 0-->Normal  11. Extinction and Inattention (formerly Neglect): 0-->No abnormality  Total (NIH Stroke Scale): 1    Modified Rachele Score: 2  [place] Coma Scale:15   ABCD2 Score:    [id number] Score:   HAS -BLED Score:   ICH Score:   Hunt & Orourke Classification:     Thrombolysis Candidate? No, Out of window     Delays to Thrombolysis?  No    Interventional Revascularization Candidate?   Is the patient eligible for mechanical endovascular reperfusion (MICHAEL)?  No; No large vessel occlusion    Hemorrhagic change of an Ischemic Stroke: Does this patient have an ischemic stroke with hemorrhagic changes? No     Subjective:     History of Present Illness:  73 yo F with PMHx of pancreatic cancer s/p whipple, adenocarcinoma of the lung with mets on chemotherapy, breast cancer s/p lumpectomy, meningioma s/p resection, HTN, DVT on apixaban, and seizure disorder on levetiracetam [facility] [place] [facility]20 after being found down by  around ~02:00.  She was normal on getting up to go to the restroom and  heard her fall, but she was speaking normally at the time.  This am there was a change in speech around 8-9 am with some intermittent clear speech.  On EMS arriving, she told them that she did not want to go back to the hospital (had been admitted recently with fevers).  She had no notable weakness.  On asking about the fall earlier in the morning, patient does not have a good memory of the event and denies clear prodrome or any aura prior to fall, remembers waking to her  talking to her and helping her up.  Son is at bedside and is a poor historian, stating that he lives in [place] most of the time, but stays with his parents a good deal.  Notes that she has had balance issues since a stroke in [id number].  Denies speech difficulty with first stroke.  Later on, notes that she does have some  trouble with her speech if she gets really tired.          Past Medical History:   Diagnosis Date    Anticoagulant long-term use     Blood clot in vein 2016    Breast cancer     Cataract     Hypertension     Lung cancer     Lung cancer metastatic to brain     Pancreatic cancer     Posterior capsular opacification, left eye     Psychiatric problem     Seizures 2016    Stroke     Therapy      Past Surgical History:   Procedure Laterality Date    BONE BIOSPY  3/9/16    BRAIN SURGERY  16    tumor removal 16    BREAST LUMPECTOMY  1998    left    CATARACT EXTRACTION Bilateral 2004    yag OU    CHOLECYSTECTOMY      COLONOSCOPY N/A 2015    Procedure: COLONOSCOPY;  Surgeon: Alex Carmona MD;  [facility]  Service: Endoscopy;  Laterality: N/A;  2 year f/u    COLONOSCOPY N/A 2018    Procedure: COLONOSCOPY;  Surgeon: Jim Raman MD;  [facility]  Service: Endoscopy;  Laterality: N/A;  Eliquis - per Dr. George santiago to hold Eliquis x 2 days prior to colon- ERW    cysto and right ureteral stent  12    ecoli      removal of blockage, done throat, then through the liver.    HYSTERECTOMY  1974    KIDNEY STONE SURGERY  --laser    left eswl  12    OOPHORECTOMY  2012    Laparoscopic BSO, lysis of adhesions, cystoscopy greater than 35mins     WHIPPLE PROCEDURE W/ LAPAROSCOPY       Family History   Problem Relation Age of Onset    Breast cancer Mother     Lung cancer Mother     Leukemia Paternal Grandfather     Stomach cancer Paternal Grandmother     Colon cancer Neg Hx     Ovarian cancer Neg Hx      Social History     Tobacco Use    Smoking status: Former Smoker     Packs/day: 0.00     Years: 0.00     Pack years: 0.00     Last attempt to quit: 1961     Years since quittin.4    Smokeless tobacco: Never Used   Substance Use Topics    Alcohol use: No    Drug use: No     Review of  patient's allergies indicates:   Allergen Reactions    Tobradex [tobramycin-dexamethasone] Swelling    Iodinated contrast media Hives    Latex Rash and Hives    Phenytoin sodium extended Other (See Comments) and Rash       Medications: I have reviewed the current medication administration record.    No current facility-administered medications for this encounter.     Current Outpatient Medications:     albuterol-ipratropium (DUO-NEB) 2.5 mg-0.5 mg/3 mL nebulizer solution, Take 3 mLs by nebulization every 6 (six) hours as needed for Wheezing or Shortness of Breath. Rescue, Disp: 1 Box, Rfl: 3    amitriptyline (ELAVIL) 50 MG tablet, Take 1 tablet (50 mg total) by mouth every evening., Disp: 90 tablet, Rfl: 12    calcium carbonate (OS-UNIQUE) 500 mg calcium (1,250 mg) tablet, Take 2 tablets (1,000 mg total) by mouth once daily., Disp: , Rfl: 0    clindamycin phosphate 1% (CLINDAGEL) 1 % gel, APPLY TOPICALLY TWICE DAILY, Disp: 60 g, Rfl: 6    CREON CpDR, TAKE ONE CAPSULE BY MOUTH THREE TIMES A DAY WITH MEALS, Disp: 270 capsule, Rfl: 3    denosumab (XGEVA) 120 mg/1.7 mL (70 mg/mL) Soln, Inject 120 mg into the skin every 28 days., Disp: , Rfl:     dronabinol (MARINOL) 5 MG capsule, Take 1 capsule (5 mg total) by mouth 2 (two) times daily before meals., Disp: 60 capsule, Rfl: 3    ELIQUIS 5 mg Tab, TAKE ONE TABLET BY MOUTH TWICE DAILY, Disp: 60 tablet, Rfl: 6    erlotinib (TARCEVA) 150 MG tablet, Take 1 tablet (150 mg total) by mouth once daily Hold medication until told to restart by Dr. Vazquez., Disp: 30 tablet, Rfl: 11    furosemide (LASIX) 40 MG tablet, Take 1 tablet (40 mg total) by mouth daily as needed (swelling)., Disp: 30 tablet, Rfl: 1    levETIRAcetam (KEPPRA) 500 MG Tab, TAKE ONE TABLET BY MOUTH TWICE DAILY, Disp: 180 tablet, Rfl: 3    levoFLOXacin (LEVAQUIN) 250 MG tablet, Take 1 tablet (250 mg total) by mouth once daily. for 4 days, Disp: 4 tablet, Rfl: 0    LORazepam (ATIVAN) 1 MG tablet, Take 1  tablet (1 mg total) by mouth 2 (two) times daily., Disp: 60 tablet, Rfl: 5    losartan (COZAAR) 50 MG tablet, TAKE ONE TABLET BY MOUTH TWICE DAILY, Disp: 60 tablet, Rfl: 10    metoprolol succinate (TOPROL-XL) 50 MG 24 hr tablet, TAKE ONE TABLET BY MOUTH ONCE DAILY. hold UNTIL your appointment WITH your pcp. heart rate and blood pressure has been normal off OF this me, Disp: 30 tablet, Rfl: 11    multivitamin (THERAGRAN) per tablet, Take 1 tablet by mouth once daily., Disp: , Rfl:     MYRBETRIQ 50 mg Tb24, TAKE ONE TABLET BY MOUTH ONCE DAILY, Disp: 30 tablet, Rfl: 11    polyethylene glycol (GLYCOLAX) 17 gram/dose powder, , Disp: , Rfl:     senna-docusate 8.6-50 mg (SENNA LAXATIVE-STOOL SOFTENER) 8.6-50 mg per tablet, Take 1 tablet by mouth once daily., Disp: , Rfl:     Review of Systems   Constitutional: Negative for chills and fever.   HENT: Negative for rhinorrhea and sneezing.    Respiratory: Negative for cough and shortness of breath.    Cardiovascular: Negative for palpitations and leg swelling.   Gastrointestinal: Negative for nausea and vomiting.   Musculoskeletal: Positive for gait problem.   Skin: Negative for rash and wound.   Neurological: Positive for speech difficulty. Negative for weakness, numbness and headaches.   Hematological: Negative for adenopathy.   Psychiatric/Behavioral: Negative for agitation and confusion.     Objective:     Vital Signs (Most Recent):  Temp: 98.4 °F (36.9 °C) (02/24/20 1146)  Pulse: 93 (02/24/20 1546)  Resp: 18 (02/24/20 1403)  BP: 137/87 (02/24/20 1546)  SpO2: 95 % (02/24/20 1403)    Vital Signs Range (Last 24H):  Temp:  [97.5 °F (36.4 °C)-98.4 °F (36.9 °C)]   Pulse:  [80-94]   Resp:  [18-22]   BP: (121-182)/(55-88)   SpO2:  [95 %-100 %]     Physical Exam  General:  Well-developed, well-nourished, nad  HEENT:  NCAT, PERRLA, EOMI, oropharyngeal membranes non-erythematous/without exudate  Neck:  Supple, normal ROM without nuchal rigidity  Resp:  Symmetric expansion, no  increased wob  CVS:  No LE edema, extremities warm/well-perfused  GI:  Abd soft, non-distended    Neurological Exam:   LOC: alert  Attention Span: Good   Language: Expressive aphasia  Articulation: No dysarthria  Orientation: Person, Place, Time   Visual Fields: Full  EOM (CN III, IV, VI): Full/intact  Pupils (CN II, III): PERRL  Facial Movement (CN VII): Symmetric facial expression    Motor: Arm left  Normal 5/5  Leg left  Normal 5/5  Arm right  Normal 5/5  Leg right Normal 5/5  Cebellar: No evidence of appendicular or axial ataxia  Sensation: Intact to light touch, temperature and vibration  Tone: Normal tone throughout    Laboratory:  CMP:   Recent Labs   Lab 20  1120   CALCIUM 11.1*   ALBUMIN 3.2*   PROT 7.4      K 4.5   CO2 21*      BUN 35*   CREATININE 1.8*   ALKPHOS 486*   ALT 65*   AST 44*   BILITOT 0.6     CBC:   Recent Labs   Lab 20  1120   WBC 7.54   RBC 3.86*   HGB 9.9*   HCT 33.0*      MCV 86   MCH 25.6*   MCHC 30.0*     Diagnostic Results:    Brain imagin20 MRI Brain w/o contrast:  No acute intracranial findings specifically without evidence for acute infarction or new parenchymal signal abnormality.  Remote operative change bifrontal craniotomy with left frontal encephalomalacia.  T2 flair signal hyperintensity in the left frontal lobe suggestive for gliosis and scarring similar to prior.  Continued right cerebellar encephalomalacia.  Please note evaluation for residual recurrent metastatic disease is limited by lack of contrast.  However no significant new parenchymal signal abnormality.    Vessel Imagin20 CTA stroke multiphase:  CTA head and neck: Unremarkable CTA of the head and neck specifically without evidence for proximal significant stenosis or occlusion.  Continued consolidative opacity in the right suprahilar region overall indeterminate which may represents scarring atelectasis underlying neoplasm not excluded.    CT head: Operative change  status post bifrontal craniotomy with underlying encephalomalacia left frontal lobe similar to prior MRI.  There is additional encephalomalacia right cerebellum.  No evidence for acute intracranial hemorrhage or sulcal effacement to suggest large territory recent infarction.  Moderate patchy paranasal sinus opacities.    Cardiac Evaluation:   04/30/18 Echo w/ CFD:   1 - Low normal to mildly depressed left ventricular systolic function (EF 50-55%) with RCA territory wall motion abnormalities (not seen on the 4/9/18 echocardiogram).     2 - Normal right ventricular systolic function .     3 - Indeterminate LV diastolic function.     4 - Mild left atrial enlargement.     5 - Mild mitral regurgitation.     6 - Mild tricuspid regurgitation.     Genesis Lanier MD  Comprehensive Stroke Center  Department of Vascular Neurology   [facility]

## 2020-02-25 NOTE — ASSESSMENT & PLAN NOTE
-MRI Brain with no acute stroke, L frontal encephalomalacia and previous R cerebellar infarct noted  -Concern for metabolic encephalopathy causing difficulty with speech, possible recrudescence   -Hx of DVT on apixaban, LDL at goal without use of statin, no other clear stroke risk factors apart from malignancy  -Hypercalcemia, STEFAN, recent febrile illness may all be contributing to presentation   -Pt declines further work up, has been in conversations with Palliative Care

## 2020-02-25 NOTE — HPI
75 yo F with PMHx of pancreatic cancer s/p whipple, adenocarcinoma of the lung with mets on chemotherapy, breast cancer s/p lumpectomy, meningioma s/p resection, HTN, DVT on apixaban, and seizure disorder on levetiracetam presents to [facility] ED 02/24/20 after being found down by  around ~02:00.  She was normal on getting up to go to the restroom and  heard her fall, but she was speaking normally at the time.  This am there was a change in speech around 8-9 am with some intermittent clear speech.  On EMS arriving, she told them that she did not want to go back to the hospital (had been admitted recently with fevers).  She had no notable weakness.  On asking about the fall earlier in the morning, patient does not have a good memory of the event and denies clear prodrome or any aura prior to fall, remembers waking to her  talking to her and helping her up.  Son is at bedside and is a poor historian, stating that he lives in [place] most of the time, but stays with his parents a good deal.  Notes that she has had balance issues since a stroke in [id number].  Denies speech difficulty with first stroke.  Later on, notes that she does have some trouble with her speech if she gets really tired.

## 2020-02-25 NOTE — PROGRESS NOTES
Contacted by the ED  Niru Miller LMSW, this afternoon and spoke with her re: patient's case. Patient was seen in the ED and discharged home this afternoon, and patient/family requesting home health services again; they are not ready for hospice. See her note for details. Niru asked if home health services can be ordered again by patient's oncologist. Message sent to Dr. Vazquez and her staff requesting home health orders, as an Internal Referral (patient uses [facility]), for SN, Aide, PT, OT, . [facility] will likely need to get authorization from [facility] again, and the earliest possible start date will be later this week. Also asked if Dr. Vazquez wants to see patient in clinic sooner than the 3/18 appointment that is scheduled, as patient has been to the ED twice since her recent hospital stay under Medical Oncology. CC'ed Niru, and my coworker Audelia Pollock, [facility], on the message as I will be off the rest of the week. Also spoke with Audelia. Called patient and discussed above with her. Patient stated understanding, and agreement with home health services beginning later this week. Answered patient's questions. Left detailed voice mail message for [facility] liason nurse at ext. [phone]. No other needs indicated at this time.

## 2020-02-25 NOTE — SUBJECTIVE & OBJECTIVE
Past Medical History:   Diagnosis Date    Anticoagulant long-term use     Blood clot in vein 2016    Breast cancer     Cataract     Hypertension     Lung cancer     Lung cancer metastatic to brain     Pancreatic cancer     Posterior capsular opacification, left eye     Psychiatric problem     Seizures 2016    Stroke     Therapy      Past Surgical History:   Procedure Laterality Date    BONE BIOSPY  3/9/16    BRAIN SURGERY  16    tumor removal 16    BREAST LUMPECTOMY  1998    left    CATARACT EXTRACTION Bilateral 2004    yag OU    CHOLECYSTECTOMY      COLONOSCOPY N/A 2015    Procedure: COLONOSCOPY;  Surgeon: Alex Carmona MD;  [facility]  Service: Endoscopy;  Laterality: N/A;  2 year f/u    COLONOSCOPY N/A 2018    Procedure: COLONOSCOPY;  Surgeon: Jim Raman MD;  [facility]  Service: Endoscopy;  Laterality: N/A;  Eliquis - per Dr. George santiago to hold Eliquis x 2 days prior to colon- ERW    cysto and right ureteral stent  12    ecoli      removal of blockage, done throat, then through the liver.    HYSTERECTOMY  1974    KIDNEY STONE SURGERY  --laser    left eswl  12    OOPHORECTOMY  2012    Laparoscopic BSO, lysis of adhesions, cystoscopy greater than 35mins     WHIPPLE PROCEDURE W/ LAPAROSCOPY       Family History   Problem Relation Age of Onset    Breast cancer Mother     Lung cancer Mother     Leukemia Paternal Grandfather     Stomach cancer Paternal Grandmother     Colon cancer Neg Hx     Ovarian cancer Neg Hx      Social History     Tobacco Use    Smoking status: Former Smoker     Packs/day: 0.00     Years: 0.00     Pack years: 0.00     Last attempt to quit: 1961     Years since quittin.4    Smokeless tobacco: Never Used   Substance Use Topics    Alcohol use: No    Drug use: No     Review of patient's allergies indicates:   Allergen Reactions     Tobradex [tobramycin-dexamethasone] Swelling    Iodinated contrast media Hives    Latex Rash and Hives    Phenytoin sodium extended Other (See Comments) and Rash       Medications: I have reviewed the current medication administration record.    No current facility-administered medications for this encounter.     Current Outpatient Medications:     albuterol-ipratropium (DUO-NEB) 2.5 mg-0.5 mg/3 mL nebulizer solution, Take 3 mLs by nebulization every 6 (six) hours as needed for Wheezing or Shortness of Breath. Rescue, Disp: 1 Box, Rfl: 3    amitriptyline (ELAVIL) 50 MG tablet, Take 1 tablet (50 mg total) by mouth every evening., Disp: 90 tablet, Rfl: 12    calcium carbonate (OS-UNIQUE) 500 mg calcium (1,250 mg) tablet, Take 2 tablets (1,000 mg total) by mouth once daily., Disp: , Rfl: 0    clindamycin phosphate 1% (CLINDAGEL) 1 % gel, APPLY TOPICALLY TWICE DAILY, Disp: 60 g, Rfl: 6    CREON CpDR, TAKE ONE CAPSULE BY MOUTH THREE TIMES A DAY WITH MEALS, Disp: 270 capsule, Rfl: 3    denosumab (XGEVA) 120 mg/1.7 mL (70 mg/mL) Soln, Inject 120 mg into the skin every 28 days., Disp: , Rfl:     dronabinol (MARINOL) 5 MG capsule, Take 1 capsule (5 mg total) by mouth 2 (two) times daily before meals., Disp: 60 capsule, Rfl: 3    ELIQUIS 5 mg Tab, TAKE ONE TABLET BY MOUTH TWICE DAILY, Disp: 60 tablet, Rfl: 6    erlotinib (TARCEVA) 150 MG tablet, Take 1 tablet (150 mg total) by mouth once daily Hold medication until told to restart by Dr. Vazquez., Disp: 30 tablet, Rfl: 11    furosemide (LASIX) 40 MG tablet, Take 1 tablet (40 mg total) by mouth daily as needed (swelling)., Disp: 30 tablet, Rfl: 1    levETIRAcetam (KEPPRA) 500 MG Tab, TAKE ONE TABLET BY MOUTH TWICE DAILY, Disp: 180 tablet, Rfl: 3    levoFLOXacin (LEVAQUIN) 250 MG tablet, Take 1 tablet (250 mg total) by mouth once daily. for 4 days, Disp: 4 tablet, Rfl: 0    LORazepam (ATIVAN) 1 MG tablet, Take 1 tablet (1 mg total) by mouth 2 (two) times daily.,  Disp: 60 tablet, Rfl: 5    losartan (COZAAR) 50 MG tablet, TAKE ONE TABLET BY MOUTH TWICE DAILY, Disp: 60 tablet, Rfl: 10    metoprolol succinate (TOPROL-XL) 50 MG 24 hr tablet, TAKE ONE TABLET BY MOUTH ONCE DAILY. hold UNTIL your appointment WITH your pcp. heart rate and blood pressure has been normal off OF this me, Disp: 30 tablet, Rfl: 11    multivitamin (THERAGRAN) per tablet, Take 1 tablet by mouth once daily., Disp: , Rfl:     MYRBETRIQ 50 mg Tb24, TAKE ONE TABLET BY MOUTH ONCE DAILY, Disp: 30 tablet, Rfl: 11    polyethylene glycol (GLYCOLAX) 17 gram/dose powder, , Disp: , Rfl:     senna-docusate 8.6-50 mg (SENNA LAXATIVE-STOOL SOFTENER) 8.6-50 mg per tablet, Take 1 tablet by mouth once daily., Disp: , Rfl:     Review of Systems   Constitutional: Negative for chills and fever.   HENT: Negative for rhinorrhea and sneezing.    Respiratory: Negative for cough and shortness of breath.    Cardiovascular: Negative for palpitations and leg swelling.   Gastrointestinal: Negative for nausea and vomiting.   Musculoskeletal: Positive for gait problem.   Skin: Negative for rash and wound.   Neurological: Positive for speech difficulty. Negative for weakness, numbness and headaches.   Hematological: Negative for adenopathy.   Psychiatric/Behavioral: Negative for agitation and confusion.     Objective:     Vital Signs (Most Recent):  Temp: 98.4 °F (36.9 °C) (02/24/20 1146)  Pulse: 93 (02/24/20 1546)  Resp: 18 (02/24/20 1403)  BP: 137/87 (02/24/20 1546)  SpO2: 95 % (02/24/20 1403)    Vital Signs Range (Last 24H):  Temp:  [97.5 °F (36.4 °C)-98.4 °F (36.9 °C)]   Pulse:  [80-94]   Resp:  [18-22]   BP: (121-182)/(55-88)   SpO2:  [95 %-100 %]     Physical Exam  General:  Well-developed, well-nourished, nad  HEENT:  NCAT, PERRLA, EOMI, oropharyngeal membranes non-erythematous/without exudate  Neck:  Supple, normal ROM without nuchal rigidity  Resp:  Symmetric expansion, no increased wob  CVS:  No LE edema, extremities  warm/well-perfused  GI:  Abd soft, non-distended    Neurological Exam:   LOC: alert  Attention Span: Good   Language: Expressive aphasia  Articulation: No dysarthria  Orientation: Person, Place, Time   Visual Fields: Full  EOM (CN III, IV, VI): Full/intact  Pupils (CN II, III): PERRL  Facial Movement (CN VII): Symmetric facial expression    Motor: Arm left  Normal 5/5  Leg left  Normal 5/5  Arm right  Normal 5/5  Leg right Normal 5/5  Cebellar: No evidence of appendicular or axial ataxia  Sensation: Intact to light touch, temperature and vibration  Tone: Normal tone throughout    Laboratory:  CMP:   Recent Labs   Lab 20  1120   CALCIUM 11.1*   ALBUMIN 3.2*   PROT 7.4      K 4.5   CO2 21*      BUN 35*   CREATININE 1.8*   ALKPHOS 486*   ALT 65*   AST 44*   BILITOT 0.6     CBC:   Recent Labs   Lab 20  1120   WBC 7.54   RBC 3.86*   HGB 9.9*   HCT 33.0*      MCV 86   MCH 25.6*   MCHC 30.0*     Diagnostic Results:    Brain imagin20 MRI Brain w/o contrast:  No acute intracranial findings specifically without evidence for acute infarction or new parenchymal signal abnormality.  Remote operative change bifrontal craniotomy with left frontal encephalomalacia.  T2 flair signal hyperintensity in the left frontal lobe suggestive for gliosis and scarring similar to prior.  Continued right cerebellar encephalomalacia.  Please note evaluation for residual recurrent metastatic disease is limited by lack of contrast.  However no significant new parenchymal signal abnormality.    Vessel Imagin20 CTA stroke multiphase:  CTA head and neck: Unremarkable CTA of the head and neck specifically without evidence for proximal significant stenosis or occlusion.  Continued consolidative opacity in the right suprahilar region overall indeterminate which may represents scarring atelectasis underlying neoplasm not excluded.    CT head: Operative change status post bifrontal craniotomy with  underlying encephalomalacia left frontal lobe similar to prior MRI.  There is additional encephalomalacia right cerebellum.  No evidence for acute intracranial hemorrhage or sulcal effacement to suggest large territory recent infarction.  Moderate patchy paranasal sinus opacities.    Cardiac Evaluation:   04/30/18 Echo w/ CFD:   1 - Low normal to mildly depressed left ventricular systolic function (EF 50-55%) with RCA territory wall motion abnormalities (not seen on the 4/9/18 echocardiogram).     2 - Normal right ventricular systolic function .     3 - Indeterminate LV diastolic function.     4 - Mild left atrial enlargement.     5 - Mild mitral regurgitation.     6 - Mild tricuspid regurgitation.

## 2020-02-27 NOTE — TELEPHONE ENCOUNTER
Spoke with Son Basil, he notes that she is doing well now. MRI was done without contrast as her creatine clearance is less than 40.  He noted that he is too tired to talk and we got disconnected, and I called back and left a voice mail. Let's see her tomorrow and assess her for home health

## 2020-03-02 ENCOUNTER — TELEPHONE (OUTPATIENT)
Dept: HEMATOLOGY/ONCOLOGY | Facility: CLINIC | Age: 75
End: 2020-03-02

## 2020-03-02 NOTE — TELEPHONE ENCOUNTER
----- Message from Karrie Vazquez MD sent at 3/2/2020  3:39 PM CST -----  Regarding: RE: Requesting Home Health Orders  Zandra,  She never came on 2/28/2020  ----- Message -----  From: Jossy Griggs LCSW  Sent: 3/2/2020  10:32 AM CST  To: Karrie Vazquez MD, George DENG Staff  Subject: RE: Requesting Home Health Orders                Ok, let me know the outcome and if you order home health.  ----- Message -----  From: Karrie Vazquez MD  Sent: 2/27/2020   5:33 PM CST  To: Jossy Griggs LCSW, Audelia Pollock LCSW, #  Subject: RE: Requesting Home Health Orders                I will see her tomorrow and assess    ----- Message -----  From: Jossy Griggs LCSW  Sent: 2/24/2020   5:43 PM CST  To: Karrie Vazquez MD, Audelia Pollock LCSW, #  Subject: Requesting Home Health Orders                    I was contacted this PM by Niru Miller LMSW,  in the ED, about patient/family asking for home health services to be ordered again for her. See her note for details. Patient was discharged from the ED to home.     Patient declined home health services during her recent hospital stay under [facility]. Home health services weren't ordered. Patient was d/c'ed from [facility].    New orders will be needed (Internal Referral) for [facility] (they will likely have to get auth from her insurance again). Please order Skilled Nursing, Aide, PT, OT, .     I see her next appt with Dr. Vazquez is 3/18 -- do you want to see her sooner?

## 2020-03-03 ENCOUNTER — CARE AT HOME (OUTPATIENT)
Dept: HOME HEALTH SERVICES | Facility: CLINIC | Age: 75
End: 2020-03-03
Payer: MEDICARE

## 2020-03-03 DIAGNOSIS — R63.0 DECREASED APPETITE: ICD-10-CM

## 2020-03-03 DIAGNOSIS — R53.1 GENERALIZED WEAKNESS: ICD-10-CM

## 2020-03-03 DIAGNOSIS — R53.81 DEBILITY: ICD-10-CM

## 2020-03-03 DIAGNOSIS — Z71.89 COUNSELING REGARDING ADVANCE CARE PLANNING AND GOALS OF CARE: ICD-10-CM

## 2020-03-03 DIAGNOSIS — Z51.5 PALLIATIVE CARE ENCOUNTER: Primary | ICD-10-CM

## 2020-03-03 PROCEDURE — 1126F PR PAIN SEVERITY QUANTIFIED, NO PAIN PRESENT: ICD-10-PCS | Mod: S$GLB,,, | Performed by: NURSE PRACTITIONER

## 2020-03-03 PROCEDURE — 1159F MED LIST DOCD IN RCRD: CPT | Mod: S$GLB,,, | Performed by: NURSE PRACTITIONER

## 2020-03-03 PROCEDURE — 3288F PR FALLS RISK ASSESSMENT DOCUMENTED: ICD-10-PCS | Mod: CPTII,S$GLB,, | Performed by: NURSE PRACTITIONER

## 2020-03-03 PROCEDURE — 99497 ADVNCD CARE PLAN 30 MIN: CPT | Mod: 25,S$GLB,, | Performed by: NURSE PRACTITIONER

## 2020-03-03 PROCEDURE — 1100F PTFALLS ASSESS-DOCD GE2>/YR: CPT | Mod: CPTII,S$GLB,, | Performed by: NURSE PRACTITIONER

## 2020-03-03 PROCEDURE — 3288F FALL RISK ASSESSMENT DOCD: CPT | Mod: CPTII,S$GLB,, | Performed by: NURSE PRACTITIONER

## 2020-03-03 PROCEDURE — 99348 HOME/RES VST EST LOW MDM 30: CPT | Mod: S$GLB,,, | Performed by: NURSE PRACTITIONER

## 2020-03-03 PROCEDURE — 1159F PR MEDICATION LIST DOCUMENTED IN MEDICAL RECORD: ICD-10-PCS | Mod: S$GLB,,, | Performed by: NURSE PRACTITIONER

## 2020-03-03 PROCEDURE — 3074F PR MOST RECENT SYSTOLIC BLOOD PRESSURE < 130 MM HG: ICD-10-PCS | Mod: CPTII,S$GLB,, | Performed by: NURSE PRACTITIONER

## 2020-03-03 PROCEDURE — 99348 PR HOME VISIT,ESTAB PATIENT,LEVEL II: ICD-10-PCS | Mod: S$GLB,,, | Performed by: NURSE PRACTITIONER

## 2020-03-03 PROCEDURE — 3074F SYST BP LT 130 MM HG: CPT | Mod: CPTII,S$GLB,, | Performed by: NURSE PRACTITIONER

## 2020-03-03 PROCEDURE — 99497 PR ADVNCD CARE PLAN 30 MIN: ICD-10-PCS | Mod: 25,S$GLB,, | Performed by: NURSE PRACTITIONER

## 2020-03-03 PROCEDURE — 1100F PR PT FALLS ASSESS DOC 2+ FALLS/FALL W/INJURY/YR: ICD-10-PCS | Mod: CPTII,S$GLB,, | Performed by: NURSE PRACTITIONER

## 2020-03-03 PROCEDURE — 1126F AMNT PAIN NOTED NONE PRSNT: CPT | Mod: S$GLB,,, | Performed by: NURSE PRACTITIONER

## 2020-03-03 PROCEDURE — 3078F PR MOST RECENT DIASTOLIC BLOOD PRESSURE < 80 MM HG: ICD-10-PCS | Mod: CPTII,S$GLB,, | Performed by: NURSE PRACTITIONER

## 2020-03-03 PROCEDURE — 3078F DIAST BP <80 MM HG: CPT | Mod: CPTII,S$GLB,, | Performed by: NURSE PRACTITIONER

## 2020-03-03 NOTE — TELEPHONE ENCOUNTER
----- Message from Karrie Vazquez MD sent at 3/3/2020  1:19 PM CST -----  Regarding: RE: Requesting Home Health Orders  Any updates?  ----- Message -----  From: Zandra Matt RN  Sent: 3/2/2020   4:20 PM CST  To: Karrie Vazquez MD, Jossy Griggs LCSW  Subject: RE: Requesting Home Health Orders                I reached out to her again today.  She is not returning my calls---  I mycharted her as well.  ~Zandra    ----- Message -----  From: Jossy Griggs LCSW  Sent: 3/2/2020   4:17 PM CST  To: Karrie Vazquez MD, Reena Tan LCSW, #  Subject: RE: Requesting Home Health Orders                Please keep me posted, I will follow up on this patient.  ----- Message -----  From: Karrie Vazquez MD  Sent: 3/2/2020   3:39 PM CST  To: Jossy Griggs LCSW, George DENG Staff  Subject: RE: Requesting Home Health Orders                Zandra,  She never came on 2/28/2020  ----- Message -----  From: Jossy Griggs LCSW  Sent: 3/2/2020  10:32 AM CST  To: Karrie Vazquez MD, George DENG Staff  Subject: RE: Requesting Home Health Orders                Ok, let me know the outcome and if you order home health.  ----- Message -----  From: Karrie Vazquez MD  Sent: 2/27/2020   5:33 PM CST  To: Jossy Griggs LCSW, Audelia Pollock LCSW, #  Subject: RE: Requesting Home Health Orders                I will see her tomorrow and assess    ----- Message -----  From: Jossy Griggs LCSW  Sent: 2/24/2020   5:43 PM CST  To: Karrie Vazquez MD, Audelia Pollock LCSW, #  Subject: Requesting Home Health Orders                    I was contacted this PM by Niru Miller LMSW,  in the ED, about patient/family asking for home health services to be ordered again for her. See her note for details. Patient was discharged from the ED to home.     Patient declined home health services during her recent hospital stay under [facility] Oncology Service. Home health services weren't ordered. Patient was d/c'ed from  [facility].    New orders will be needed (Internal Referral) for [facility] (they will likely have to get auth from her insurance again). Please order Skilled Nursing, Aide, PT, OT, .     I see her next appt with Dr. Vazquez is 3/18 -- do you want to see her sooner?

## 2020-03-03 NOTE — TELEPHONE ENCOUNTER
"Spoke with patient.  She states she does not need HH at all.  She does not feel she needs to come in to see dr sullivan to discuss this.  "my son is the one who keeps saying this. I am OK."  Nurse advised patient to contact dr sullivan if she ever changes her mind.   She prefers to keep appointments as is.  She thanked nurse.      Message routed to dr sullivan   "

## 2020-03-03 NOTE — PROGRESS NOTES
[facility]  Palliative Care Home Visit    Visit Date: 3/3/2020  Encounter Provider: Kiya Landin DNP, FNP-C  PCP:  Olvin Mars MD    Subjective:      Patient ID: Naty St is a 74 y.o. female.    Consult Requested By:  ??  Reason for Consult:  Palliative Care      Chief Complaint:  Establish Palliative Care    Outpatient Palliative Care Encounter:    Ms. Naty St is a 75 y/o female with PMHx of Lung Cancer metastatic to brain, pancreatic cancer (), breast cancer (), hypertension, blood clot in vein (on anticoagulant long-term use), seizures, stroke, psychiatric problem, brain surgery with tumor removal (2016), cholecystectomy, whipple (), oophorectomy (), hysterectomy (), and kidney stone surgery.     Patient resides at home with her  and son. She reports she has been  for 31 years and has 4 children (not with current ). She has 2 siblings, 1 sister (lives in [place]) and 1 brother ().     Impression:      Patient is sitting on the sofa in her living room.  is present. She is awake, alert, and oriented x 3. No c/o pain, but she reports feeling weak. She had radiation last in 2020. Denies SOB or pain. Appetite is improving. She is taking Chemotherapy pill daily (Tricevia).  Appears thin;however, she looks like she feels better compared to my last visit.        Goals of Care:    Reviewed [place] form and [facility] Advance Care Directives form with patient.   Patient completed [place] form. Would like to be DNR. Discussed hospice benefits but patient would like to continue with palliative care.     Discussed goals of care with patient. She would like to feel better and continue to feel better.       PLAN:  1. Palliative care to follow-up with patient on 3/27  2. Continue all medications  3. Patient to complete advance directives  4. Offer supportive to care to the patient and family    Review of Systems   Constitutional:  Negative for activity change, appetite change, chills, fatigue and fever.   HENT: Positive for rhinorrhea. Negative for congestion, postnasal drip, sinus pressure, sinus pain, sneezing, sore throat and trouble swallowing.    Eyes: Positive for visual disturbance (glasses).   Respiratory: Positive for cough (occasional NP cough). Negative for choking, chest tightness, shortness of breath and wheezing.    Cardiovascular: Negative for chest pain, palpitations and leg swelling.   Gastrointestinal: Negative for abdominal distention, abdominal pain, blood in stool, constipation, diarrhea, nausea and vomiting.        +decreased appetite at times (but improving)   Endocrine: Negative for polyuria.   Genitourinary: Negative for difficulty urinating and hematuria.   Musculoskeletal: Negative for arthralgias, back pain, gait problem, joint swelling, myalgias, neck pain and neck stiffness.   Skin: Positive for pallor. Negative for rash and wound.   Neurological: Positive for weakness (generalized weakness). Negative for dizziness, tremors, seizures, syncope, light-headedness and headaches.   Hematological: Bruises/bleeds easily.   Psychiatric/Behavioral: Negative for confusion, hallucinations, self-injury, sleep disturbance and suicidal ideas. The patient is nervous/anxious.        Assessments:  · Environmental: single story home; good lighting; uncluttered; lives with spouse and son  · Functional Status: independent of all ADLs,   · Safety: someone present at all times  · Nutritional: 3 meals; snacks, protein shakes  · Home Health/DME/Supplies: cane, tub chair, toilet chair, walker    Symptom Assessment (ESAS 0-10 scale)     ESAS 0 1 2 3 4 5 6 7 8 9 10   Pain X             Dyspnea   X           Anxiety   x           Nausea X             Depression      x         Anorexia     X         Fatigue     x         Insomnia X             Restlessness  X             Agitation X               Constipation    no  Bowel Management Plan  (BMP): no  Diarrhea        no  Comments: don't need anything; regular BMs;  LBM 3/3/2020    Performance Status:   PPS Score 60  Karnofsky Score:  60  EGOC:  2    History:  Past Medical History:   Diagnosis Date    Anticoagulant long-term use     Blood clot in vein 06/2016    Breast cancer 1994    Cataract     Hypertension     Lung cancer     Lung cancer metastatic to brain     Pancreatic cancer 1998    Posterior capsular opacification, left eye     Psychiatric problem     Seizures 01/25/2016    Stroke     Therapy      Family History   Problem Relation Age of Onset    Breast cancer Mother     Lung cancer Mother     Leukemia Paternal Grandfather     Stomach cancer Paternal Grandmother     Colon cancer Neg Hx     Ovarian cancer Neg Hx      Past Surgical History:   Procedure Laterality Date    BONE BIOSPY  3/9/16    BRAIN SURGERY  1/12/16    tumor removal 1/12/16    BREAST LUMPECTOMY  1998    left    CATARACT EXTRACTION Bilateral 2004    yag OU    CHOLECYSTECTOMY  1998    COLONOSCOPY N/A 11/13/2015    Procedure: COLONOSCOPY;  Surgeon: Alex Carmona MD;  Location: [facility] ENDO ([address] [place] [address]);  Service: Endoscopy;  Laterality: N/A;  2 year f/u    COLONOSCOPY N/A 11/7/2018    Procedure: COLONOSCOPY;  Surgeon: Jim Raman MD;  Location: [facility] ([address] [place] [address]);  Service: Endoscopy;  Laterality: N/A;  Eliquis - per Dr. George Marcialok to hold Eliquis x 2 days prior to colon- ERW    cysto and right ureteral stent  4/27/12    eco  2010    removal of blockage, done throat, then through the liver.    HYSTERECTOMY  1974    KIDNEY STONE SURGERY  2010--laser    left eswl  6/20/12    OOPHORECTOMY  4/25/2012    Laparoscopic BSO, lysis of adhesions, cystoscopy greater than 35mins     WHIPPLE PROCEDURE W/ LAPAROSCOPY  1998     Review of patient's allergies indicates:   Allergen Reactions    Tobradex [tobramycin-dexamethasone] Swelling    Iodinated contrast media Hives    Latex Rash and Hives    Phenytoin  sodium extended Other (See Comments) and Rash       Medications:    Current Outpatient Medications:     albuterol-ipratropium (DUO-NEB) 2.5 mg-0.5 mg/3 mL nebulizer solution, Take 3 mLs by nebulization every 6 (six) hours as needed for Wheezing or Shortness of Breath. Rescue, Disp: 1 Box, Rfl: 3    amitriptyline (ELAVIL) 50 MG tablet, Take 1 tablet (50 mg total) by mouth every evening., Disp: 90 tablet, Rfl: 12    calcium carbonate (OS-UNIQUE) 500 mg calcium (1,250 mg) tablet, Take 2 tablets (1,000 mg total) by mouth once daily., Disp: , Rfl: 0    clindamycin phosphate 1% (CLINDAGEL) 1 % gel, APPLY TOPICALLY TWICE DAILY, Disp: 60 g, Rfl: 6    CREON CpDR, TAKE ONE CAPSULE BY MOUTH THREE TIMES A DAY WITH MEALS, Disp: 270 capsule, Rfl: 3    denosumab (XGEVA) 120 mg/1.7 mL (70 mg/mL) Soln, Inject 120 mg into the skin every 28 days., Disp: , Rfl:     dronabinol (MARINOL) 5 MG capsule, Take 1 capsule (5 mg total) by mouth 2 (two) times daily before meals., Disp: 60 capsule, Rfl: 3    ELIQUIS 5 mg Tab, TAKE ONE TABLET BY MOUTH TWICE DAILY, Disp: 60 tablet, Rfl: 6    erlotinib (TARCEVA) 150 MG tablet, Take 1 tablet (150 mg total) by mouth once daily Hold medication until told to restart by Dr. Vazquez., Disp: 30 tablet, Rfl: 11    furosemide (LASIX) 40 MG tablet, Take 1 tablet (40 mg total) by mouth daily as needed (swelling)., Disp: 30 tablet, Rfl: 1    levETIRAcetam (KEPPRA) 500 MG Tab, TAKE ONE TABLET BY MOUTH TWICE DAILY, Disp: 180 tablet, Rfl: 3    LORazepam (ATIVAN) 1 MG tablet, Take 1 tablet (1 mg total) by mouth 2 (two) times daily., Disp: 60 tablet, Rfl: 5    losartan (COZAAR) 50 MG tablet, TAKE ONE TABLET BY MOUTH TWICE DAILY, Disp: 60 tablet, Rfl: 10    metoprolol succinate (TOPROL-XL) 50 MG 24 hr tablet, TAKE ONE TABLET BY MOUTH ONCE DAILY. hold UNTIL your appointment WITH your pcp. heart rate and blood pressure has been normal off OF this me, Disp: 30 tablet, Rfl: 11    multivitamin (THERAGRAN) per  tablet, Take 1 tablet by mouth once daily., Disp: , Rfl:     MYRBETRIQ 50 mg Tb24, TAKE ONE TABLET BY MOUTH ONCE DAILY, Disp: 30 tablet, Rfl: 11    polyethylene glycol (GLYCOLAX) 17 gram/dose powder, , Disp: , Rfl:     senna-docusate 8.6-50 mg (SENNA LAXATIVE-STOOL SOFTENER) 8.6-50 mg per tablet, Take 1 tablet by mouth once daily., Disp: , Rfl:     24h Oral Morphine Equivalents (OME):  n/a    Objective:     Physical Exam:    There is no height or weight on file to calculate BMI.    Physical Exam   Constitutional: She is oriented to person, place, and time. She appears well-developed and well-nourished. No distress.   Sitting on sofa in living room. Awake, alert, and oriented x 3; forgetful; appears to not feel well.   HENT:   Head: Normocephalic and atraumatic.   Nose: Nose normal.   Mouth/Throat: No oropharyngeal exudate.   Eyes: Conjunctivae and EOM are normal. No scleral icterus.   Neck: Normal range of motion. Neck supple. No JVD present. No thyromegaly present.   Cardiovascular: Normal rate, regular rhythm, normal heart sounds and intact distal pulses. Exam reveals no gallop.   No murmur heard.  Pulmonary/Chest: Effort normal. No respiratory distress. She has no rales. She exhibits no tenderness.   SOB with exertion   Abdominal: Soft. Bowel sounds are normal. She exhibits no distension. There is no tenderness. No hernia.   Musculoskeletal: Normal range of motion. She exhibits no edema or tenderness.   Neurological: She is alert and oriented to person, place, and time.   Forgetful at times   Skin: Skin is warm and dry. Capillary refill takes less than 2 seconds. No rash noted. No erythema. No pallor.   Psychiatric: She has a normal mood and affect. Her behavior is normal. Judgment and thought content normal.   Nursing note and vitals reviewed.      Labs:  CBC:   WBC   Date Value Ref Range Status   02/24/2020 7.54 3.90 - 12.70 K/uL Final      82 -  Hemoglobin   Date Value Ref Range Status   02/24/2020 9.9 (L)  12.0 - 16.0 g/dL Final   FT  POC Hematocrit   Date Value Ref Range Status   02/21/2020 30 (L) 36 - 54 %PCV Final     Hematocrit   Date Value Ref Range Status   02/24/2020 33.0 (L) 37.0 - 48.5 % Final   e Mean Corpuscular Volume   Date Value Ref Range Status   02/24/2020 86 82 - 98 fL Final   Value Ref Range Status   02/24/2020 3.2 (L) 3.5 - 5.2 g/dL Final     Protein:   Total Protein   Date Value Ref Range Status   02/24/2020 7.4 6.0 - 8.4 g/dL Final       Radiology:  I have reviewed all pertinent imaging results/findings within the past 24 hours.    Psychosocial/Cultural/Spiritual:  · F- Amairani and Belief:  Religion   · I - Importance: yes  · C - Community: [facility] [employer]  · A - Address in Care: Goes to [employer] occasionally; no spiritual needs identified      Assessment:     1. Palliative care encounter  2. Counseling regarding Advance directives and goals of care  3. Debility  4. Generalized weakness  5. Poor appetite    Plan:     Ethical / Legal: Advance Care Planning   · Surrogate decision maker:  Name: Javier St, Relationship:   · Code Status:  DNR  · [place]:  Left form at patient's house to review  · Other advance directive: no , left [facility] Advance Directives at patient's house  · Capacity to make medical decisions:  yes,   · Conflict: none       There are no diagnoses linked to this encounter.    Were controlled substances prescribed?  No     >60min of 75 min visit spent in chart review,  symptom assessment, evaluation and treatment, and coordination of care and emotional support.    >Remaining 15 mins of 75 min visit spent discussing palliative care, advance directives, goals of care, hospice and hospice benefits.      Follow Up Appointments:   Future Appointments   Date Time Provider Department Center   3/5/2020  1:00 PM JULIO, UROLOGY [facility]   3/18/2020  8:50 AM LAB, HEMONC CANCER BLDG [facility]   3/18/2020 10:00 AM Devika Chappell,  STEPHANY [facility] HEM ONC [facility]   3/18/2020 10:30 AM INJECTION, [facility] INFUSION [facility]   3/19/2020  1:30 PM Nel Ramirez NP [address]   3/27/2020 10:00 AM PALLIATIVE AND SUPPORT CARE-HEMOC [facility] HEM ONC [facility]   4/23/2020  9:30 AM [facility] PET CT LIMIT 500 LBS [facility]   4/30/2020  8:30 AM LAB, HEMONC CANCER BLDG [facility]   4/30/2020  9:40 AM Karrie Vazquez MD [facility] HEM ONC [facility]   4/30/2020 10:00 AM INJECTION, [facility] INFUSION [facility]       Patient consent obtained for treatment on this visit    Signature:  Kiya Landin DNP, FNP-C  [facility]

## 2020-03-04 NOTE — PROGRESS NOTES
[facility]      New Recurrent Urinary Tract Infection Note    2/21/20    Referred by:  Self, Aaareferral    HPI: Naty St is a very pleasant 74 y.o. female who is a new patient to our department referred for evaluation of recurrent urinary tract infections. She reports that frequent infections began 6 months ago. These episodes are marked by symptoms including dysuria, frequency and urgency.  Her symptoms typically resolve with antibiotic therapy. The frequency of these episodes is typically every 2-3 months with approximately 2 infections over the last 6 months.  Urine cultures were available for review and demonstrate organisms including Klebsiella.    Independent of episodes of infection, she denies symptoms of irritative voiding including dysuria, frequency, urgency and incontinence. She denies symptoms of obstructive voiding including decreased stream, hesitancy, intermittency, post void dribbling and sense of incomplete emptying. Bladder scan PVR was 22 mL.  Her history includes no notation of urolithiasis, hematuria, prior pelvic surgery, previous prolapse or incontinence procedures or neurological symptoms/diagnoses. She reports no urinary incontinence. She reports a vaginal bulge that she is able to visualize at times, though she denies splinting to void or defecate. She is unsure how much she is independently bothered by the vaginal bulge.        Previous evaluation for her infections have included Renal U/S without cystoscopy. Previous treatments for her recurrent infections have included antibiotics for each infection.     A review of 10+ systems was conducted with pertinent positive and negative findings documented in HPI with all other systems reviewed and negative.    Past medical, family, surgical and social history reviewed as documented in chart with pertinent positive medical, family, surgical and social history detailed in HPI.    Exam Findings:    Const: no acute  distress, conversant and alert  Eyes: anicteric, extraocular muscles intact  ENMT: normocephalic, Nl oral membranes  Cardio: no cyanosis, nl cap refill  Pulm: no tachypnea; no resp distress  Abd: soft, no tenderness  Musc: no laceration, no tenderness  Neuro: alert; oriented x 3  Skin: warm, dry; no petichiae  Psych: no anxiety; normal speech  Vaginal exam deferred to time of cystoscopy       Assessment/Plan:    Recurrent Urinary Tract Infection (new, addt'l workup): Her history suggests recurrent urinary tract infections based on history of symptoms and response to therapy.  Given her history, I have recommended cystoscopy with no need for additional upper tract evaluation. . We can perform independent flow rate and repeat PVR next visit to further screen for any voiding dysfunction. In the meantime, during her evaluation we can begin measures to prevent further infections.     1. Schedule office cystoscopy  2. Renal U/S and KUB  3. Daily probiotics containing Lactobacillus species (e.g. RepHresh Pro-B, probiotic yogurts)  4. D-mannose 1000 mg bid             Clinical tests reviewed/ordered today: urinalysis (03/04/2020) U/S for post void residual (03/04/2020) urine cultures (2/20/20)   Radiology ordered/reviewed: renal U/S   Medical tests ordered/reviewed: cystourethroscopy   Radiology independently reviewed: none   Previous records: reviewed today and showing, in summary - Patient with history of UTI and sepsis.

## 2020-03-05 ENCOUNTER — HOSPITAL ENCOUNTER (OUTPATIENT)
Dept: UROLOGY | Facility: HOSPITAL | Age: 75
Discharge: HOME | End: 2020-03-05
Attending: UROLOGY
Payer: MEDICARE

## 2020-03-05 ENCOUNTER — TELEPHONE (OUTPATIENT)
Dept: UROLOGY | Facility: CLINIC | Age: 75
End: 2020-03-05

## 2020-03-05 VITALS
HEART RATE: 71 BPM | BODY MASS INDEX: 22.44 KG/M2 | RESPIRATION RATE: 17 BRPM | DIASTOLIC BLOOD PRESSURE: 72 MMHG | WEIGHT: 134.69 LBS | SYSTOLIC BLOOD PRESSURE: 182 MMHG | HEIGHT: 65 IN | TEMPERATURE: 99 F

## 2020-03-05 DIAGNOSIS — N32.81 OAB (OVERACTIVE BLADDER): ICD-10-CM

## 2020-03-05 PROCEDURE — 52000 CYSTOURETHROSCOPY: CPT | Mod: HCNC,,, | Performed by: UROLOGY

## 2020-03-05 PROCEDURE — 52000 PR CYSTOURETHROSCOPY: ICD-10-PCS | Mod: HCNC,,, | Performed by: UROLOGY

## 2020-03-05 PROCEDURE — 52000 CYSTOURETHROSCOPY: CPT | Mod: HCNC

## 2020-03-05 RX ORDER — LIDOCAINE HYDROCHLORIDE 20 MG/ML
JELLY TOPICAL
Status: COMPLETED | OUTPATIENT
Start: 2020-03-05 | End: 2020-03-05

## 2020-03-05 RX ADMIN — LIDOCAINE HYDROCHLORIDE: 20 JELLY TOPICAL at 01:03

## 2020-03-05 NOTE — PATIENT INSTRUCTIONS
What to Expect After a Cystoscopy  For the next 24-48 hours, you may feel a mild burning when you urinate. This burning is normal and expected. Drink plenty of water to dilute the urine to help relieve the burning sensation. You may also see a small amount of blood in your urine after the procedure.    Unless you are already taking antibiotics, you may be given an antibiotic after the test to prevent infection.    Signs and Symptoms to Report  Call the [facility] at [phone] if you develop any of the following:  · Fever of 101 degrees or higher  · Chills or persistent bleeding  · Inability to urinate

## 2020-03-05 NOTE — PROCEDURES
Office Cystourethroscopy Note    Date: 3/5/2020   Referring Provider: Jean Carlos Vela MD     Reason for Cystoscopy: Recurrent UTI    Upper Tract Evaluation:    Renal U/S: Normal upper urinary tract    Procedure Details: Informed consent was obtained and she was sterily prepped and 1% lidocaine jelly was injected per urethra. A flexible cystoscope was inserted into the bladder via the urethra. There was no evidence of stricture, stenosis, lesions, other obstruction or diverticulum. Significant findings included a normal unobstructed urethra only. Cystoscopic examination of the bladder revealed orthotopically positioned, normal bilateral ureteral orifices with clear yellow urine effluxing from each orifice. All mucosal surfaces were examined with no apparent stones, tumors, foreign bodies, erythema, trabeculation, diverticula or ulcers. The procedure was concluded without complications. The patient was not administered a post-procedure antibiotic.     Specimens: No Specimens    Findings: Normal bladder and urethra    Other Findings:  PVR - 0 mL    Pelvic Exam:  No Pelvic Exam Repeated Today     Assesment and Plan:   Recurrent Urinary Tract Infections: No primary bladder pathology identified today to explain her recurrent urinary tract infections. We have recommended continued treatment discussed at last clinic visit.      She has little to no POP on exam today. She does have vaginal atrophy, though with her history of breast cancer, I am holding off on topical vaginal estrogen therapy for now. She reports symptoms suggestive of rectocele with constipation. No evidence today. Would recommend aggressive management of constipation for now.

## 2020-03-05 NOTE — ED NOTES
Patient identifiers verified and correct for Naty St.    LOC: The patient is awake, alert and aware of environment with an appropriate affect, the patient is oriented x 4 and speaking appropriately.      APPEARANCE: Patient resting comfortably and in no acute distress, patient is clean and well groomed, patient's clothing is properly fastened.    SKIN: The skin is warm and dry, color consistent with ethnicity, patient has normal skin turgor and moist mucus membranes, skin intact, no breakdown or bruising noted.      MUSCULOSKELETAL: Patient moving all extremities spontaneously, BLE + 1 edema,  no deformities noted.    RESPIRATORY: Airway is open and patent, respirations are spontaneous, patient has a normal effort and rate, no accessory muscle use noted, bilateral breath sounds clear    CARDIAC: Tachycardia , BLE edema noted, capillary refill < 3 seconds.    ABDOMEN: Soft and non tender to palpation, no distention noted, normoactive bowel sounds present in all four quadrants.    NEUROLOGIC: PERRLA, 3 mm bilaterally, eyes open spontaneously, behavior appropriate to situation, follows commands, facial expression symmetrical, bilateral hand grasp equal and even, purposeful motor response noted, normal sensation in all extremities when touched with a finger.       Self Administrated

## 2020-03-06 ENCOUNTER — TELEPHONE (OUTPATIENT)
Dept: PRIMARY CARE CLINIC | Facility: CLINIC | Age: 75
End: 2020-03-06

## 2020-03-06 NOTE — TELEPHONE ENCOUNTER
Spoke to patient to schedule new patient appt with Dr. Franco. Patient stated she does not know who Dr. Franco is and refused to schedule a new patient appt. Patient also stated Dr. Mars did not tell her that he wanted her to switch PCPs to Dr. Franco and she wants to talk to him about this matter because she has been his patient for over 20 years and she stated she feels like he is dumpping her off on someone else without informing her first.

## 2020-03-10 RX ORDER — LORAZEPAM 1 MG/1
TABLET ORAL
Qty: 60 TABLET | Refills: 5 | Status: SHIPPED | OUTPATIENT
Start: 2020-03-10 | End: 2020-01-01

## 2020-03-17 ENCOUNTER — TELEPHONE (OUTPATIENT)
Dept: HEMATOLOGY/ONCOLOGY | Facility: CLINIC | Age: 75
End: 2020-03-17

## 2020-03-17 ENCOUNTER — PATIENT OUTREACH (OUTPATIENT)
Dept: ADMINISTRATIVE | Facility: OTHER | Age: 75
End: 2020-03-17

## 2020-03-17 NOTE — TELEPHONE ENCOUNTER
Spoke with shirley and patient.  She can't do labs today.  She will do labs and get xgeva tomorrow.  Dr. Vazquez will call her for visit.  She thanked nurse.

## 2020-03-17 NOTE — TELEPHONE ENCOUNTER
"----- Message from Dorinda Giles sent at 3/17/2020  8:59 AM CDT -----  Contact: Son   Name of caller: Basil   Provider name: George VALENTINO MD   Contact Preference:  [phone]  Is this regarding current patient or new patient?: current   What is the nature of the call?  - pt has injection scheduled for tomorrow and her Son has   questions about rather or not she should come in or not.     Additional Notes:   "Thank you for all that you do for our patients'"     "

## 2020-03-17 NOTE — TELEPHONE ENCOUNTER
"----- Message from Dorinda Giles sent at 3/17/2020  8:59 AM CDT -----  Contact: Son   Name of caller: Basil   Provider name: George VALENTINO MD   Contact Preference:  [phone]  Is this regarding current patient or new patient?: current   What is the nature of the call?  - pt has injection scheduled for tomorrow and her Son has   questions about rather or not she should come in or not.     Additional Notes:   "Thank you for all that you do for our patients'"     "

## 2020-03-18 ENCOUNTER — INFUSION (OUTPATIENT)
Dept: INFUSION THERAPY | Facility: HOSPITAL | Age: 75
End: 2020-03-18
Attending: INTERNAL MEDICINE
Payer: MEDICARE

## 2020-03-18 VITALS — TEMPERATURE: 98 F

## 2020-03-18 DIAGNOSIS — C34.31 MALIGNANT NEOPLASM OF LOWER LOBE OF RIGHT LUNG: Primary | ICD-10-CM

## 2020-03-18 PROCEDURE — 96372 THER/PROPH/DIAG INJ SC/IM: CPT | Mod: HCNC

## 2020-03-18 PROCEDURE — 63600175 PHARM REV CODE 636 W HCPCS: Mod: JG,HCNC | Performed by: INTERNAL MEDICINE

## 2020-03-18 RX ADMIN — DENOSUMAB 120 MG: 120 INJECTION SUBCUTANEOUS at 10:03

## 2020-03-18 NOTE — NURSING
105 patient tolerated xgeva to abd, denies jaw pain, reported taking supplements. NAD noted, pt d/c home.

## 2020-03-20 ENCOUNTER — TELEPHONE (OUTPATIENT)
Dept: HEMATOLOGY/ONCOLOGY | Facility: CLINIC | Age: 75
End: 2020-03-20

## 2020-03-20 NOTE — TELEPHONE ENCOUNTER
Spoke with patient about going to virtual visit and she agreed. Not sure if she is really understanding what is going on. Then patient hung up.

## 2020-03-23 ENCOUNTER — DOCUMENTATION ONLY (OUTPATIENT)
Dept: HEMATOLOGY/ONCOLOGY | Facility: CLINIC | Age: 75
End: 2020-03-23

## 2020-03-23 ENCOUNTER — TELEPHONE (OUTPATIENT)
Dept: HEMATOLOGY/ONCOLOGY | Facility: CLINIC | Age: 75
End: 2020-03-23

## 2020-03-23 NOTE — TELEPHONE ENCOUNTER
----- Message from Jossy Griggs LCSW sent at 3/23/2020 12:08 PM CDT -----  Regarding: Pall Care Appt on 3/27  Contact: [phone]  I received a call from patient asking if I could assist. She was talking to staff on the phone Fri about her 3/27 Pall Care (Dr. Ann) appt being changed to a phone visit and the call dropped. She's trying to reach staff back about it. Please call her at her cell number [phone].

## 2020-03-23 NOTE — TELEPHONE ENCOUNTER
Spoke to patient about her appointment with Dr. Ann on 3/27 at 10 am. Pt understood that it will be an over the phone call. Pt is unable to work patient portal.

## 2020-03-23 NOTE — PROGRESS NOTES
I received a call from patient asking if I could assist her. She was talking to staff on the phone last Fri. about her 3/27 Palliative Care (Dr. Ann) appt. being changed to a phone visit and the call dropped. She's trying to reach staff back about it. Explained to patient that I am able to see this information in her chart and I will send a message to Zaira Luciano MA, who had called her on Fri., and to Dr. Vazquez's Staff in case Zaira is not at work today, and will ask staff to call her at her cell number [phone]. Patient expressed understanding and appreciation. No other needs indicated for Oncology Social Worker at this time.

## 2020-03-27 ENCOUNTER — CARE AT HOME (OUTPATIENT)
Dept: HOME HEALTH SERVICES | Facility: CLINIC | Age: 75
End: 2020-03-27
Payer: MEDICARE

## 2020-03-27 ENCOUNTER — CLINICAL SUPPORT (OUTPATIENT)
Dept: HEMATOLOGY/ONCOLOGY | Facility: CLINIC | Age: 75
End: 2020-03-27
Payer: MEDICARE

## 2020-03-27 DIAGNOSIS — Z71.89 COUNSELING REGARDING ADVANCE CARE PLANNING AND GOALS OF CARE: ICD-10-CM

## 2020-03-27 DIAGNOSIS — Z51.5 PALLIATIVE CARE BY SPECIALIST: ICD-10-CM

## 2020-03-27 DIAGNOSIS — C34.90 NON-SMALL CELL LUNG CANCER, UNSPECIFIED LATERALITY: Primary | ICD-10-CM

## 2020-03-27 DIAGNOSIS — R53.1 GENERALIZED WEAKNESS: ICD-10-CM

## 2020-03-27 DIAGNOSIS — Z51.5 PALLIATIVE CARE ENCOUNTER: Primary | ICD-10-CM

## 2020-03-27 DIAGNOSIS — R53.81 DEBILITY: ICD-10-CM

## 2020-03-27 DIAGNOSIS — R68.89 UNINTENTIONAL WEIGHT CHANGE: ICD-10-CM

## 2020-03-27 DIAGNOSIS — R05.9 COUGH: ICD-10-CM

## 2020-03-27 PROCEDURE — 99497 PR ADVNCD CARE PLAN 30 MIN: ICD-10-PCS | Mod: 25,S$GLB,, | Performed by: NURSE PRACTITIONER

## 2020-03-27 PROCEDURE — 99348 HOME/RES VST EST LOW MDM 30: CPT | Mod: S$GLB,,, | Performed by: NURSE PRACTITIONER

## 2020-03-27 PROCEDURE — 99497 ADVNCD CARE PLAN 30 MIN: CPT | Mod: 25,S$GLB,, | Performed by: NURSE PRACTITIONER

## 2020-03-27 PROCEDURE — 1159F MED LIST DOCD IN RCRD: CPT | Mod: S$GLB,,, | Performed by: NURSE PRACTITIONER

## 2020-03-27 PROCEDURE — 99999 PR PBB SHADOW E&M-EST. PATIENT-LVL I: CPT | Mod: PBBFAC,HCNC,,

## 2020-03-27 PROCEDURE — 3078F PR MOST RECENT DIASTOLIC BLOOD PRESSURE < 80 MM HG: ICD-10-PCS | Mod: CPTII,S$GLB,, | Performed by: NURSE PRACTITIONER

## 2020-03-27 PROCEDURE — 99348 PR HOME VISIT,ESTAB PATIENT,LEVEL II: ICD-10-PCS | Mod: S$GLB,,, | Performed by: NURSE PRACTITIONER

## 2020-03-27 PROCEDURE — 1126F PR PAIN SEVERITY QUANTIFIED, NO PAIN PRESENT: ICD-10-PCS | Mod: S$GLB,,, | Performed by: NURSE PRACTITIONER

## 2020-03-27 PROCEDURE — 3077F SYST BP >= 140 MM HG: CPT | Mod: CPTII,S$GLB,, | Performed by: NURSE PRACTITIONER

## 2020-03-27 PROCEDURE — 99999 PR PBB SHADOW E&M-EST. PATIENT-LVL I: ICD-10-PCS | Mod: PBBFAC,HCNC,,

## 2020-03-27 PROCEDURE — 1101F PR PT FALLS ASSESS DOC 0-1 FALLS W/OUT INJ PAST YR: ICD-10-PCS | Mod: CPTII,S$GLB,, | Performed by: NURSE PRACTITIONER

## 2020-03-27 PROCEDURE — 1159F PR MEDICATION LIST DOCUMENTED IN MEDICAL RECORD: ICD-10-PCS | Mod: S$GLB,,, | Performed by: NURSE PRACTITIONER

## 2020-03-27 PROCEDURE — 3077F PR MOST RECENT SYSTOLIC BLOOD PRESSURE >= 140 MM HG: ICD-10-PCS | Mod: CPTII,S$GLB,, | Performed by: NURSE PRACTITIONER

## 2020-03-27 PROCEDURE — 1101F PT FALLS ASSESS-DOCD LE1/YR: CPT | Mod: CPTII,S$GLB,, | Performed by: NURSE PRACTITIONER

## 2020-03-27 PROCEDURE — 1126F AMNT PAIN NOTED NONE PRSNT: CPT | Mod: S$GLB,,, | Performed by: NURSE PRACTITIONER

## 2020-03-27 PROCEDURE — 3078F DIAST BP <80 MM HG: CPT | Mod: CPTII,S$GLB,, | Performed by: NURSE PRACTITIONER

## 2020-03-27 NOTE — PROGRESS NOTES
Consult Note  Palliative Care      Consult Requested By: Dr. Ash Sandra  Reason for Consult: ongoing goals of care discussions in the setting of lung cancer    The patient location is: home  The chief complaint leading to consultation is: follow up form hospital  Visit type: Virtual visit with synchronous audio (telephone)  Total time spent with patient: 15  Each patient to whom he or she provides medical services by telemedicine is:  (1) informed of the relationship between the physician and patient and the respective role of any other health care provider with respect to management of the patient; and (2) notified that he or she may decline to receive medical services by telemedicine and may withdraw from such care at any time.      ASSESSMENT/PLAN:     Plan/Recommendations:  Diagnoses and all orders for this visit:    Non-small cell lung cancer, unspecified laterality  -cont treatment for now; tolerating well; Dr. Vazquez in a month    Palliative care by specialist  -cont to follow up with Kiya Landin NP pal care at home    Unintentional weight change    ACP  Pt well aware of importance of completing documents          Ethical / Legal Advance Care Planning      Living Will: No  LaPOST: No  Do Not Resuscitate Status: Yes based on previous discussion in the hospital; pt understands the benefits of filling out proper paperwork to ensure her wishes are followed and her family has the confidence to follow her written word  Medical Power of : No; spouse is surrogate Troy Cross Spouse [phone]               SUBJECTIVE:     History of Present Illness:  Ms. Naty St is a 73 y/o female with PMHx of Lung Cancer metastatic to brain, pancreatic cancer (1998), breast cancer (1994), hypertension, blood clot in vein (on anticoagulant long-term use), seizures, stroke, psychiatric problem, brain surgery with tumor removal (1/12/2016), cholecystectomy, whipple (1998), oophorectomy  (), hysterectomy (), and kidney stone surgery.      Patient resides at home with her  and son. She reports she has been  for 31 years and has 4 children (not with current ). She has 2 siblings, 1 sister (lives in [place]) and 1 brother ().     Long oncologic history per onc charts having survived multiple (pcanreatic-s/p whipple, meningioma/surgical resection, breast cancer) cancers now tolerating Tarceva well.     Per phone call today, she continues to do well with no complaints at home. Next follow up in several weeks with Dr. Vazquez.    Past Medical History:   Diagnosis Date    Anticoagulant long-term use     Blood clot in vein 2016    Breast cancer     Cataract     Hypertension     Lung cancer     Lung cancer metastatic to brain     Pancreatic cancer     Posterior capsular opacification, left eye     Psychiatric problem     Seizures 2016    Stroke     Therapy      Past Surgical History:   Procedure Laterality Date    BONE BIOSPY  3/9/16    BRAIN SURGERY  16    tumor removal 16    BREAST LUMPECTOMY      left    CATARACT EXTRACTION Bilateral 2004    yag [facility]    CHOLECYSTECTOMY      COLONOSCOPY N/A 2015    Procedure: COLONOSCOPY;  Surgeon: Alex Carmona MD;  Location: [facility] ([address] [place] [address]);  Service: Endoscopy;  Laterality: N/A;  2 year f/u    COLONOSCOPY N/A 2018    Procedure: COLONOSCOPY;  Surgeon: Jim Raman MD;  Location: [facility] ([address] [place] [address]);  Service: Endoscopy;  Laterality: N/A;  Eliquis - per Dr. Vazquez -ok to hold Eliquis x 2 days prior to colon- ERW    cysto and right ureteral stent  12    ecoli      removal of blockage, done throat, then through the liver.    HYSTERECTOMY      KIDNEY STONE SURGERY  --laser    left eswl  12    OOPHORECTOMY  2012    Laparoscopic BSO, lysis of adhesions, cystoscopy greater than 35mins     WHIPPLE PROCEDURE W/ LAPAROSCOPY       Family  History   Problem Relation Age of Onset    Breast cancer Mother     Lung cancer Mother     Leukemia Paternal Grandfather     Stomach cancer Paternal Grandmother     Colon cancer Neg Hx     Ovarian cancer Neg Hx      Review of patient's allergies indicates:   Allergen Reactions    Tobradex [tobramycin-dexamethasone] Swelling    Iodinated contrast media Hives    Latex Rash and Hives    Phenytoin sodium extended Other (See Comments) and Rash       Medications:    Current Outpatient Medications:     albuterol-ipratropium (DUO-NEB) 2.5 mg-0.5 mg/3 mL nebulizer solution, Take 3 mLs by nebulization every 6 (six) hours as needed for Wheezing or Shortness of Breath. Rescue, Disp: 1 Box, Rfl: 3    amitriptyline (ELAVIL) 50 MG tablet, Take 1 tablet (50 mg total) by mouth every evening., Disp: 90 tablet, Rfl: 12    calcium carbonate (OS-UNIQUE) 500 mg calcium (1,250 mg) tablet, Take 2 tablets (1,000 mg total) by mouth once daily., Disp: , Rfl: 0    clindamycin phosphate 1% (CLINDAGEL) 1 % gel, APPLY TOPICALLY TWICE DAILY, Disp: 60 g, Rfl: 6    CREON CpDR, TAKE ONE CAPSULE BY MOUTH THREE TIMES A DAY WITH MEALS, Disp: 270 capsule, Rfl: 3    denosumab (XGEVA) 120 mg/1.7 mL (70 mg/mL) Soln, Inject 120 mg into the skin every 28 days., Disp: , Rfl:     dronabinol (MARINOL) 5 MG capsule, Take 1 capsule (5 mg total) by mouth 2 (two) times daily before meals., Disp: 60 capsule, Rfl: 3    ELIQUIS 5 mg Tab, TAKE ONE TABLET BY MOUTH TWICE DAILY, Disp: 60 tablet, Rfl: 6    erlotinib (TARCEVA) 150 MG tablet, Take 1 tablet (150 mg total) by mouth once daily Hold medication until told to restart by Dr. Vazquez., Disp: 30 tablet, Rfl: 11    furosemide (LASIX) 40 MG tablet, Take 1 tablet (40 mg total) by mouth daily as needed (swelling)., Disp: 30 tablet, Rfl: 1    levETIRAcetam (KEPPRA) 500 MG Tab, TAKE ONE TABLET BY MOUTH TWICE DAILY, Disp: 180 tablet, Rfl: 3    LORazepam (ATIVAN) 1 MG tablet, TAKE ONE TABLET BY MOUTH TWICE  DAILY, Disp: 60 tablet, Rfl: 5    losartan (COZAAR) 50 MG tablet, TAKE ONE TABLET BY MOUTH TWICE DAILY, Disp: 60 tablet, Rfl: 10    metoprolol succinate (TOPROL-XL) 50 MG 24 hr tablet, TAKE ONE TABLET BY MOUTH ONCE DAILY. hold UNTIL your appointment WITH your pcp. heart rate and blood pressure has been normal off OF this me, Disp: 30 tablet, Rfl: 11    multivitamin (THERAGRAN) per tablet, Take 1 tablet by mouth once daily., Disp: , Rfl:     MYRBETRIQ 50 mg Tb24, TAKE ONE TABLET BY MOUTH ONCE DAILY, Disp: 30 tablet, Rfl: 11    polyethylene glycol (GLYCOLAX) 17 gram/dose powder, , Disp: , Rfl:     senna-docusate 8.6-50 mg (SENNA LAXATIVE-STOOL SOFTENER) 8.6-50 mg per tablet, Take 1 tablet by mouth once daily., Disp: , Rfl:       24h Oral Morphine Equivalents (OME): 0    OBJECTIVE:     Symptom Assessment (ESAS 0-10 scale)    ESAS 0 1 2 3 4 5 6 7 8 9 10   Pain []  []  []  []  []  []  []  []  []  []  []    Dyspnea []  []  []  []  []  []  []  []  []  []  []    Anxiety []  []  []  []  []  []  []  []  []  []  []    Nausea []  []  []  []  []  []  []  []  []  []  []    Depression  []  []  []  []  []  []  []  []  []  []  []    Anorexia []  []  []  []  []  []  []  []  []  []  []    Fatigue []  []  []  []  []  []  []  []  []  []  []    Insomnia []  []  []  []  []  []  []  []  []  []  []    Restlessness  []  []  []  []  []  []  []  []  []  []  []    Agitation []  []  []  []  []  []  []  []  []  []  []        Constipation    no  Bowel Management Plan (BMP): yes  Diarrhea        no  Comments:   Perfromance Status: PPS Score 80  ROS:  Review of Systems    Physical Exam:  Vitals:    Physical Exam  No physical exam; phone call  Labs:  CBC:   WBC   Date Value Ref Range Status   03/18/2020 4.84 3.90 - 12.70 K/uL Final     Hemoglobin   Date Value Ref Range Status   03/18/2020 8.2 (L) 12.0 - 16.0 g/dL Final     POC Hematocrit   Date Value Ref Range Status   02/21/2020 30 (L) 36 - 54 %PCV Final     Hematocrit   Date Value Ref Range  Status   2020 28.9 (L) 37.0 - 48.5 % Final     Mean Corpuscular Volume   Date Value Ref Range Status   2020 85 82 - 98 fL Final     Platelets   Date Value Ref Range Status   2020 313 150 - 350 K/uL Final           LFT:   Lab Results   Component Value Date    AST 27 2020     (H) 2020    ALKPHOS 160 (H) 2020    BILITOT 0.6 2020       Albumin:   Albumin   Date Value Ref Range Status   2020 3.0 (L) 3.5 - 5.2 g/dL Final     Protein:   Total Protein   Date Value Ref Range Status   2020 6.6 6.0 - 8.4 g/dL Final       Radiology:I have reviewed all pertinent imaging results/findings within the past 24 hours.    Advance Care Planning   Advanced Directives::  Living Will: No  LaPOST: No  Do Not Resuscitate Status: Yes  Medical Power of : No; spouse is surrogate Troy Cross Spouse [phone]          Decision-Making Capacity: Patient answered questions, Family answered questions        Living Arrangements: Lives with spouse     Psychosocial/Cultural:  Patient's most important priorities:  Family and being at home     Patient's biggest concerns/fears:  Having her death unnecessarily prolonged     Previous death/end of life care history:  Her nephew  several months ago in an ICU; she knows she doesn't want that     Patient's goals/hopes:  Good QOL and minimizing suffering     Spiritual:      F- Amairani and Belief: Violet     I - Importance: yes  .  C - Community: very much so     A - Address in Care: family  closely invovled        > 50% of 40 min visit spent in chart review, face to face discussion of goals of care,  symptom assessment, coordination of care and emotional support.      Signature: Shaka Ann MD

## 2020-03-28 RX ORDER — PANCRELIPASE 60000; 12000; 38000 [USP'U]/1; [USP'U]/1; [USP'U]/1
CAPSULE, DELAYED RELEASE PELLETS ORAL
Qty: 270 CAPSULE | Refills: 3 | Status: SHIPPED | OUTPATIENT
Start: 2020-03-28 | End: 2020-01-01 | Stop reason: SDUPTHER

## 2020-03-29 VITALS
HEART RATE: 75 BPM | RESPIRATION RATE: 18 BRPM | SYSTOLIC BLOOD PRESSURE: 110 MMHG | DIASTOLIC BLOOD PRESSURE: 78 MMHG | OXYGEN SATURATION: 95 % | TEMPERATURE: 99 F

## 2020-03-30 NOTE — PATIENT INSTRUCTIONS
INSTRUCTIONS:    1. Follow-up with palliative care NP on 3/27  2. Continue all medications  3. Fall precautions at all times  4. F/u with PCP as needed  5. Eat 3 meals a day and snacks.   6. In an Emergency, call 911 or go to ED; notify PCP's office

## 2020-04-08 ENCOUNTER — TELEPHONE (OUTPATIENT)
Dept: HEMATOLOGY/ONCOLOGY | Facility: CLINIC | Age: 75
End: 2020-04-08

## 2020-04-08 NOTE — TELEPHONE ENCOUNTER
Spoke with patient.  Explained no visitor policy to her.  Assured her we will make sure she gets where she needs to be and back to her car.  She thanked nurse.

## 2020-04-08 NOTE — TELEPHONE ENCOUNTER
"----- Message from Dorinda Giles sent at 4/8/2020  1:38 PM CDT -----  Name of caller:   Provider name: George VALENTINO MD  Contact Preference:  [phone]   Is this regarding current patient or new patient?: current   What is the nature of the call?  - pt has general questions about the 4/23 appts coming up.     Additional Notes:   "Thank you for all that you do for our patients'"     "

## 2020-04-17 VITALS
DIASTOLIC BLOOD PRESSURE: 73 MMHG | HEART RATE: 64 BPM | TEMPERATURE: 98 F | RESPIRATION RATE: 20 BRPM | SYSTOLIC BLOOD PRESSURE: 143 MMHG | OXYGEN SATURATION: 96 %

## 2020-04-17 NOTE — PATIENT INSTRUCTIONS
INSTRUCTIONS:    1. Follow-up with palliative care NP on 4/21/2020  2. Continue all medications  3. Fall precautions at all times  4. Family and patient to practice social distancing and stay 6 feet from others  5. Monitor for symptoms of Covid 19 virus  6. F/u with PCP as needed  7. Refill Rx for Creon sent to pharmacy of choice  8. In an Emergency, call 911 or go to ED; notify PCP's office

## 2020-04-17 NOTE — PROGRESS NOTES
[facility]  Palliative Care Home Visit    Visit Date: 3/27/2020  Encounter Provider: Kiya Landin DNP, FNP-C  PCP:  Olvin Mars MD    Subjective:      Patient ID: Naty St is a 74 y.o. female.    Consult Requested By:  ??  Reason for Consult:  Palliative Care      Chief Complaint:  Palliative Care Follow-up visit    Outpatient Palliative Care Encounter:    Ms. Naty St is a 75 y/o female with PMHx of Lung Cancer metastatic to brain, pancreatic cancer (), breast cancer (), hypertension, blood clot in vein (on anticoagulant long-term use), seizures, stroke, psychiatric problem, brain surgery with tumor removal (2016), cholecystectomy, whipple (), oophorectomy (), hysterectomy (), and kidney stone surgery.     Patient resides at home with her  and son. She reports she has been  for 31 years and has 4 children (not with current ). She has 2 siblings, 1 sister (lives in [place]) and 1 brother ().     Impression:      Patient seen today for follow-up palliative care visit. Patient's son is present. Patient is sitting on the sofa in her living room.  is present. She is awake, alert, and oriented x 3. No c/o pain, but she reports feeling weak. She had radiation last in 2020. Denies SOB or pain. Appetite is improving. She is taking Chemotherapy pill daily (Tricevia).  Appears thin;however, she looks like she feels better compared to my last visit.        Goals of Care:    Reviewed [place] form and [facility] Advance Care Directives form with patient.   Patient completed [place] form. Would like to be DNR. Discussed hospice benefits but patient would like to continue with palliative care.     Discussed goals of care with patient. She would like to feel better and continue to feel better.       PLAN:  1. Palliative care to follow-up with patient on 2020  2. Continue all medications  3. Patient to complete advance directives  4.  Refill Creon sent Rx to pharmacy of choice  5. Offer supportive to care to the patient and family    Review of Systems   Constitutional: Negative for activity change, appetite change (appetite has improved), chills, fatigue and fever.   HENT: Positive for rhinorrhea. Negative for congestion, postnasal drip, sinus pressure, sinus pain, sneezing, sore throat and trouble swallowing.    Eyes: Positive for visual disturbance (glasses).   Respiratory: Positive for cough (occasional NP cough) and shortness of breath (SOB with exertion). Negative for choking, chest tightness and wheezing.    Cardiovascular: Negative for chest pain, palpitations and leg swelling.   Gastrointestinal: Negative for abdominal distention, abdominal pain, blood in stool, constipation, diarrhea, nausea and vomiting.        +decreased appetite at times (but improving)   Endocrine: Negative for polyuria.   Genitourinary: Negative for difficulty urinating and hematuria.   Musculoskeletal: Negative for arthralgias, back pain, gait problem, joint swelling, myalgias, neck pain and neck stiffness.   Skin: Positive for pallor. Negative for rash and wound.   Neurological: Positive for weakness (generalized weakness). Negative for dizziness, tremors, seizures, syncope, light-headedness and headaches.   Hematological: Bruises/bleeds easily.   Psychiatric/Behavioral: Negative for confusion, hallucinations, self-injury, sleep disturbance and suicidal ideas. The patient is nervous/anxious.        Assessments:  · Environmental: single story home; good lighting; uncluttered; lives with spouse and son  · Functional Status: independent of all ADLs,   · Safety: someone present at all times  · Nutritional: 3 meals; snacks, protein shakes  · Home Health/DME/Supplies: cane, tub chair, toilet chair, walker    Symptom Assessment (ESAS 0-10 scale)     ESAS 0 1 2 3 4 5 6 7 8 9 10   Pain X             Dyspnea   X           Anxiety   x           Nausea X              Depression      x         Anorexia     X         Fatigue     x         Insomnia X             Restlessness  X             Agitation X               Constipation    no  Bowel Management Plan (BMP): no  Diarrhea        no  Comments: don't need anything; regular BMs;  3/27/2020 normal    Performance Status:   PPS Score 60  Karnofsky Score:  60  EGOC:  2    History:  Past Medical History:   Diagnosis Date    Anticoagulant long-term use     Blood clot in vein 06/2016    Breast cancer 1994    Cataract     Hypertension     Lung cancer     Lung cancer metastatic to brain     Pancreatic cancer 1998    Posterior capsular opacification, left eye     Psychiatric problem     Seizures 01/25/2016    Stroke     Therapy      Family History   Problem Relation Age of Onset    Breast cancer Mother     Lung cancer Mother     Leukemia Paternal Grandfather     Stomach cancer Paternal Grandmother     Colon cancer Neg Hx     Ovarian cancer Neg Hx      Past Surgical History:   Procedure Laterality Date    BONE BIOSPY  3/9/16    BRAIN SURGERY  1/12/16    tumor removal 1/12/16    BREAST LUMPECTOMY  1998    left    CATARACT EXTRACTION Bilateral 2004    yag OU    CHOLECYSTECTOMY  1998    COLONOSCOPY N/A 11/13/2015    Procedure: COLONOSCOPY;  Surgeon: Alex Carmona MD;  Location: [facility] ([address] [place] [address]);  Service: Endoscopy;  Laterality: N/A;  2 year f/u    COLONOSCOPY N/A 11/7/2018    Procedure: COLONOSCOPY;  Surgeon: Jim Raman MD;  Location: [facility] ([address] [place] [address]);  Service: Endoscopy;  Laterality: N/A;  Eliquis - per Dr. George santiago to hold Eliquis x 2 days prior to colon- ERW    cysto and right ureteral stent  4/27/12    ecoli  2010    removal of blockage, done throat, then through the liver.    HYSTERECTOMY  1974    KIDNEY STONE SURGERY  2010--laser    left eswl  6/20/12    OOPHORECTOMY  4/25/2012    Laparoscopic BSO, lysis of adhesions, cystoscopy greater than 35mins     WHIPPLE PROCEDURE W/ LAPAROSCOPY   1998     Review of patient's allergies indicates:   Allergen Reactions    Tobradex [tobramycin-dexamethasone] Swelling    Iodinated contrast media Hives    Latex Rash and Hives    Phenytoin sodium extended Other (See Comments) and Rash       Medications:    Current Outpatient Medications:     albuterol-ipratropium (DUO-NEB) 2.5 mg-0.5 mg/3 mL nebulizer solution, Take 3 mLs by nebulization every 6 (six) hours as needed for Wheezing or Shortness of Breath. Rescue, Disp: 1 Box, Rfl: 3    amitriptyline (ELAVIL) 50 MG tablet, Take 1 tablet (50 mg total) by mouth every evening., Disp: 90 tablet, Rfl: 12    calcium carbonate (OS-UNIQUE) 500 mg calcium (1,250 mg) tablet, Take 2 tablets (1,000 mg total) by mouth once daily., Disp: , Rfl: 0    clindamycin phosphate 1% (CLINDAGEL) 1 % gel, APPLY TOPICALLY TWICE DAILY, Disp: 60 g, Rfl: 6    CREON CpDR, TAKE ONE CAPSULE BY MOUTH THREE TIMES A DAY WITH MEALS, Disp: 270 capsule, Rfl: 3    denosumab (XGEVA) 120 mg/1.7 mL (70 mg/mL) Soln, Inject 120 mg into the skin every 28 days., Disp: , Rfl:     dronabinol (MARINOL) 5 MG capsule, Take 1 capsule (5 mg total) by mouth 2 (two) times daily before meals., Disp: 60 capsule, Rfl: 3    ELIQUIS 5 mg Tab, TAKE ONE TABLET BY MOUTH TWICE DAILY, Disp: 60 tablet, Rfl: 6    erlotinib (TARCEVA) 150 MG tablet, Take 1 tablet (150 mg total) by mouth once daily Hold medication until told to restart by Dr. Vazquez., Disp: 30 tablet, Rfl: 11    furosemide (LASIX) 40 MG tablet, Take 1 tablet (40 mg total) by mouth daily as needed (swelling)., Disp: 30 tablet, Rfl: 1    levETIRAcetam (KEPPRA) 500 MG Tab, TAKE ONE TABLET BY MOUTH TWICE DAILY, Disp: 180 tablet, Rfl: 3    LORazepam (ATIVAN) 1 MG tablet, Take 1 tablet (1 mg total) by mouth 2 (two) times daily., Disp: 60 tablet, Rfl: 5    losartan (COZAAR) 50 MG tablet, TAKE ONE TABLET BY MOUTH TWICE DAILY, Disp: 60 tablet, Rfl: 10    metoprolol succinate (TOPROL-XL) 50 MG 24 hr tablet, TAKE ONE  TABLET BY MOUTH ONCE DAILY. hold UNTIL your appointment WITH your pcp. heart rate and blood pressure has been normal off OF this me, Disp: 30 tablet, Rfl: 11    multivitamin (THERAGRAN) per tablet, Take 1 tablet by mouth once daily., Disp: , Rfl:     MYRBETRIQ 50 mg Tb24, TAKE ONE TABLET BY MOUTH ONCE DAILY, Disp: 30 tablet, Rfl: 11    polyethylene glycol (GLYCOLAX) 17 gram/dose powder, , Disp: , Rfl:     senna-docusate 8.6-50 mg (SENNA LAXATIVE-STOOL SOFTENER) 8.6-50 mg per tablet, Take 1 tablet by mouth once daily., Disp: , Rfl:     24h Oral Morphine Equivalents (OME):  n/a    Objective:     Physical Exam:    There is no height or weight on file to calculate BMI.    Physical Exam   Constitutional: She is oriented to person, place, and time. She appears well-developed and well-nourished. No distress.   Sitting on sofa in living room. Awake, alert, and oriented x 3; forgetful; appears to not feel well.   HENT:   Head: Normocephalic and atraumatic.   Nose: Nose normal.   Mouth/Throat: No oropharyngeal exudate.   Eyes: Conjunctivae and EOM are normal. No scleral icterus.   Neck: Normal range of motion. Neck supple. No JVD present. No thyromegaly present.   Cardiovascular: Normal rate, regular rhythm, normal heart sounds and intact distal pulses. Exam reveals no gallop.   No murmur heard.  Pulmonary/Chest: Effort normal. No respiratory distress. She has no rales. She exhibits no tenderness.   SOB with exertion   Abdominal: Soft. Bowel sounds are normal. She exhibits no distension. There is no tenderness. No hernia.   Musculoskeletal: Normal range of motion. She exhibits no edema or tenderness.   Neurological: She is alert and oriented to person, place, and time.   Forgetful at times   Skin: Skin is warm and dry. Capillary refill takes less than 2 seconds. No rash noted. No erythema. No pallor.   Psychiatric: She has a normal mood and affect. Her behavior is normal. Judgment and thought content normal.   Nursing  note and vitals reviewed.      Labs:  CBC:   WBC   Date Value Ref Range Status   02/24/2020 7.54 3.90 - 12.70 K/uL Final      82 -  Hemoglobin   Date Value Ref Range Status   02/24/2020 9.9 (L) 12.0 - 16.0 g/dL Final   FT  POC Hematocrit   Date Value Ref Range Status   02/21/2020 30 (L) 36 - 54 %PCV Final     Hematocrit   Date Value Ref Range Status   02/24/2020 33.0 (L) 37.0 - 48.5 % Final   e Mean Corpuscular Volume   Date Value Ref Range Status   02/24/2020 86 82 - 98 fL Final   Value Ref Range Status   02/24/2020 3.2 (L) 3.5 - 5.2 g/dL Final     Protein:   Total Protein   Date Value Ref Range Status   02/24/2020 7.4 6.0 - 8.4 g/dL Final       Radiology:  I have reviewed all pertinent imaging results/findings within the past 24 hours.    Psychosocial/Cultural/Spiritual:  · F- Amair[place] and Belief:  Hinduism   · I - Importance: yes  · C - [facility]  · A - Address in Care: Goes to [place] occasionally; no spiritual needs identified      Assessment:     1. Palliative care encounter  2. Counseling regarding Advance directives and goals of care  3. Debility  4. Generalized weakness  5. cough    Plan:     Ethical / Legal: Advance Care Planning   · Surrogate decision maker:  Name: Javier St, Relationship:   · Code Status:  DNR  · [place]:  Left form at patient's house to review  · Other advance directive: no , left [facility] Advance Directives at patient's house  · Capacity to make medical decisions:  yes,   · Conflict: none       There are no diagnoses linked to this encounter.    Were controlled substances prescribed?  No     >60min of 75 min visit spent in chart review,  symptom assessment, evaluation and treatment, and coordination of care and emotional support.    >Remaining 15 mins of 75 min visit spent discussing palliative care, advance directives, goals of care, hospice and hospice benefits.      Follow Up Appointments:   Future Appointments   Date Time Provider  Department Center   3/5/2020  1:00 PM BIGGRAKAN, UROLOGY [facility]   3/18/2020  8:50 AM LAB, HEMONC CANCER BLDG [facility]   3/18/2020 10:00 AM Devika Chappell PA-C [facility] HEM ONC [facility]   3/18/2020 10:30 AM INJECTION, [facility] INFUSION [facility]   3/19/2020  1:30 PM Nel Ramirez, JENIFER [address]   3/27/2020 10:00 AM PALLIATIVE AND SUPPORT CARE-HEMOC [facility] HEM ONC [facility]   4/23/2020  9:30 AM [facility] PET CT LIMIT 500 LBS [facility] PET CT [facility]   4/30/2020  8:30 AM LAB, HEMONC CANCER BLDG [facility]   4/30/2020  9:40 AM Karrie Vazquez MD [facility] HEM ONC [facility]   4/30/2020 10:00 AM INJECTION, [facility] INFUSION [facility]       Patient consent obtained for treatment on this visit    Attestation: Screening criteria to assess the level of the patient's risk for infection with COVID-19 as recommended by the CDC at the time of the above documented home visit concluded appropriateness to proceed.     Patient reports she has SOB with exertion, +NP cough; denies fever, sore throat, chest pain, body aches, diarrhea.    Patient reports she has not been out of the country in the last 14 days    Universal precautions were maintained at all times, including provider use of >60% alcohol gel hand  immediately prior to entry and upon departing the patient's home as well as cleaning of equipment used in home visit with antibacterial/germicidal disposable wipes.    Signature:  Kiya Landin DNP, FNP-C  [facility]

## 2020-04-20 ENCOUNTER — TELEPHONE (OUTPATIENT)
Dept: HEMATOLOGY/ONCOLOGY | Facility: CLINIC | Age: 75
End: 2020-04-20

## 2020-04-22 ENCOUNTER — TELEPHONE (OUTPATIENT)
Dept: HOME HEALTH SERVICES | Facility: CLINIC | Age: 75
End: 2020-04-22

## 2020-04-22 NOTE — TELEPHONE ENCOUNTER
4/21/2020    Called this morning and spoke with Ms. St. Stated she was doing okay and didn't really need me to make a home visit. Stated she has been staying home and has not left the house due to the Covid virus.     She denies having any symptoms of virus. No fever, sore throat, cough, body aches. States she has not been exposed to anyone with the virus.     She expresses she has appt. On 4/23 and 4/30. Instructed her to wear her mask when going to appts and to only go to appts if absolutely needing to.    Verbalized understanding.    Resceduled pt for possible virtual, audio, or home visit in 3 weeks.    Kiya Landin, PADMINI, FNP-C  [facility]

## 2020-04-23 ENCOUNTER — HOSPITAL ENCOUNTER (OUTPATIENT)
Dept: RADIOLOGY | Facility: HOSPITAL | Age: 75
Discharge: HOME | End: 2020-04-23
Attending: INTERNAL MEDICINE
Payer: MEDICARE

## 2020-04-23 DIAGNOSIS — C34.31 MALIGNANT NEOPLASM OF LOWER LOBE OF RIGHT LUNG: ICD-10-CM

## 2020-04-23 LAB — POCT GLUCOSE: 65 MG/DL (ref 70–110)

## 2020-04-23 PROCEDURE — 78815 PET IMAGE W/CT SKULL-THIGH: CPT | Mod: TC,HCNC

## 2020-04-23 PROCEDURE — 78815 PET IMAGE W/CT SKULL-THIGH: CPT | Mod: 26,HCNC,PS, | Performed by: RADIOLOGY

## 2020-04-23 PROCEDURE — 78815 NM PET CT ROUTINE: ICD-10-PCS | Mod: 26,HCNC,PS, | Performed by: RADIOLOGY

## 2020-04-23 PROCEDURE — A9552 F18 FDG: HCPCS | Mod: HCNC

## 2020-04-27 DIAGNOSIS — Z13.9 SCREENING FOR CONDITION: Primary | ICD-10-CM

## 2020-04-27 NOTE — PROGRESS NOTES
04/27/2020      In an effort to protect our immunocompromised patients from potential exposure to COVID-19, [facility] will now require all patients receiving an infusion, an injection, and/or radiation therapy to be tested for COVID-19 prior to their appointment.  All patients currently under treatment will be tested immediately, and patients initiating new treatment cycles or with one-time appointments (injections, transfusions, etc.) must be tested within 72 hours of their appointment.     Placed COVID-19 test order for patient.  A member of our team is to contact the patient in the near future to explain this process and the rationale behind it, to ask the COVID-19 screening questions, and to get the patient scheduled for their COVID-19 test.     The above was completed in accordance with instructions and guidelines set forth by [facility].     Signed,    Willie Zelaya, PADMINI     Date:  04/27/2020

## 2020-04-29 ENCOUNTER — PATIENT MESSAGE (OUTPATIENT)
Dept: HEMATOLOGY/ONCOLOGY | Facility: CLINIC | Age: 75
End: 2020-04-29

## 2020-04-29 ENCOUNTER — TELEPHONE (OUTPATIENT)
Dept: HEMATOLOGY/ONCOLOGY | Facility: CLINIC | Age: 75
End: 2020-04-29

## 2020-04-29 NOTE — TELEPHONE ENCOUNTER
04/29/2020      Phoned the patient.      Discussed the following with the patient:  In an effort to protect our immunocompromised patients from potential exposure to COVID-19, [facility] will now require all patients receiving an infusion, an injection, and/or radiation therapy to be tested for COVID-19 prior to their appointment.  All patients currently under treatment will be tested immediately, and patients initiating new treatment cycles or with one-time appointments (injections, transfusions, etc.) must be tested within 72 hours of their appointment.      Symptom Patient's Response   Fever YES/NO: no   Cough YES/NO: no   Shortness of breath  YES/NO: no   Difficulty breathing YES/NO: no   GI symptoms such as diarrhea or nausea YES/NO: no   Loss of taste YES/NO: no   Loss of smell YES/NO: no     Other Screening Patient's Response   Has the patient previously undergone COVID-19 testing? YES/NO: no     If yes to the question directly above, what was the result? not applicable   Has the patient been in close contact with someone who has undergone COVID-19 testing? YES/NO: no     If yes to the question directly above, what was the result? not applicable      The patient's 24-hour COVID-19 test was ordered and scheduled.  Reviewed the appointment date, time, and location with the patient.      Advised the patient that she can expect the results to take approximately 24 hours.  Advised the patient that someone will reach out to her regarding her results if she tests positive, as her treatment appointment will be rescheduled if she tests positive for COVID-19.  Also advised the patient that if she does not hear back from our office or if she is told by our office she has tested negative for COVID-19, she can proceed with treatment as originally planned.     Questions were answered to the patient's satisfaction, and the patient verbalized understanding of information and agreement with the plan.       The above was  completed in accordance with instructions and guidelines set forth by [facility].     Signed,        Perri Lee     Date:  04/29/2020

## 2020-04-30 ENCOUNTER — LAB VISIT (OUTPATIENT)
Dept: INTERNAL MEDICINE | Facility: CLINIC | Age: 75
End: 2020-04-30
Payer: MEDICARE

## 2020-04-30 ENCOUNTER — OFFICE VISIT (OUTPATIENT)
Dept: HEMATOLOGY/ONCOLOGY | Facility: CLINIC | Age: 75
End: 2020-04-30
Payer: MEDICARE

## 2020-04-30 DIAGNOSIS — C34.32 MALIGNANT NEOPLASM OF LOWER LOBE, LEFT BRONCHUS OR LUNG: ICD-10-CM

## 2020-04-30 DIAGNOSIS — D50.0 IRON DEFICIENCY ANEMIA DUE TO CHRONIC BLOOD LOSS: Primary | ICD-10-CM

## 2020-04-30 DIAGNOSIS — C77.1 SECONDARY MALIGNANT NEOPLASM OF INTRATHORACIC LYMPH NODES: ICD-10-CM

## 2020-04-30 DIAGNOSIS — C34.91 ADENOCARCINOMA OF LUNG, RIGHT: Primary | ICD-10-CM

## 2020-04-30 DIAGNOSIS — N18.30 STAGE 3 CHRONIC KIDNEY DISEASE: ICD-10-CM

## 2020-04-30 DIAGNOSIS — C79.51 SECONDARY CANCER OF BONE: ICD-10-CM

## 2020-04-30 DIAGNOSIS — Z13.9 SCREENING FOR CONDITION: Primary | ICD-10-CM

## 2020-04-30 DIAGNOSIS — Z13.9 SCREENING FOR CONDITION: ICD-10-CM

## 2020-04-30 DIAGNOSIS — C79.31 BRAIN METASTASES: ICD-10-CM

## 2020-04-30 PROBLEM — D64.9 ANEMIA: Status: ACTIVE | Noted: 2020-04-30

## 2020-04-30 PROBLEM — R65.20 SEVERE SEPSIS: Status: RESOLVED | Noted: 2020-02-17 | Resolved: 2020-04-30

## 2020-04-30 PROBLEM — A41.9 SEVERE SEPSIS: Status: RESOLVED | Noted: 2020-02-17 | Resolved: 2020-04-30

## 2020-04-30 LAB — SARS-COV-2 RNA RESP QL NAA+PROBE: NOT DETECTED

## 2020-04-30 PROCEDURE — 1159F PR MEDICATION LIST DOCUMENTED IN MEDICAL RECORD: ICD-10-PCS | Mod: HCNC,95,, | Performed by: INTERNAL MEDICINE

## 2020-04-30 PROCEDURE — 99443 PR PHYSICIAN TELEPHONE EVALUATION 21-30 MIN: ICD-10-PCS | Mod: HCNC,95,, | Performed by: INTERNAL MEDICINE

## 2020-04-30 PROCEDURE — 99443 PR PHYSICIAN TELEPHONE EVALUATION 21-30 MIN: CPT | Mod: HCNC,95,, | Performed by: INTERNAL MEDICINE

## 2020-04-30 PROCEDURE — 1159F MED LIST DOCD IN RCRD: CPT | Mod: HCNC,95,, | Performed by: INTERNAL MEDICINE

## 2020-04-30 PROCEDURE — 1101F PT FALLS ASSESS-DOCD LE1/YR: CPT | Mod: HCNC,CPTII,95, | Performed by: INTERNAL MEDICINE

## 2020-04-30 PROCEDURE — U0002 COVID-19 LAB TEST NON-CDC: HCPCS | Mod: HCNC

## 2020-04-30 PROCEDURE — 1101F PR PT FALLS ASSESS DOC 0-1 FALLS W/OUT INJ PAST YR: ICD-10-PCS | Mod: HCNC,CPTII,95, | Performed by: INTERNAL MEDICINE

## 2020-04-30 NOTE — PROGRESS NOTES
Subjective:      The patient location is: home  The chief complaint leading to consultation is: lung cancer  Visit type: audio only  Total time spent with patient: 25 minutes  Each patient to whom he or she provides medical services by telemedicine is:  (1) informed of the relationship between the physician and patient and the respective role of any other health care provider with respect to management of the patient; and (2) notified that he or she may decline to receive medical services by telemedicine and may withdraw from such care at any time.    Notes: see HPI    Patient ID: Naty St is a 74 y.o. female.    Chief Complaint: Lung Cancer  Oncologic History:  Ms. Naty St was admitted to the hospital between 01/25/2016 and 01/28/2016. She has a history of pancreatic cancer 17 years ago, breast cancer and meningioma, presented to the hospital complaining of loss of consciousness. The patient apparently was in her normal state of health and she stood up to go to the bathroom and fell to the floor. The patient's daughter helped the mother up in the bathroom and noted that her mother's upper extremities were shaking and eye rolling. No reports of bowel or bladder incontinence, tongue biting or rolling. The second episode lasted about four minutes and she was extremely lethargic following that. Apparently, the patient had a meningioma resection done in the past; however, recently, underwent neurosurgical resection with Dr. 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    Additional immunohistochemical stains were performed which revealed the tumor cells to be are positive for TTF1 and negative for ER and GCDFP. The morphology and TTF1 positivity are most consistent with lung primary.    Of note, imaging scan at the end of January 2016 revealed a mass in the lung at 2 cm in the medial aspect  "of the apical segment of the right upper lobe abutting the mediastinum at the level of the azygous vein and abutting and possibly encasing the segmental bronchi and vessels of the apical segment of the right upper lobe and no pleural fluid was present. Also, there is an enlarged right paratracheal lymph node. No evidence of any metastatic disease at the pancreatic site with postoperative changes post Whipple disease  Her PET Scan from 2/15/16 reveal "Hypermetabolic mass in the right lung apex consistent with a primary malignancy. Hypermetabolic mediastinal lymph nodes consistent with metastatic disease. Right sacral hypermetabolic lesion consistent with metastatic disease, noting additional mildly sclerotic lesions in multiple vertebral bodies which do not demonstrate abnormal hypermetabolism  She underwent IR bone biopsy which revealed metastatic adenocarcinoma of lung origin. 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  She has completed focal RT to brain lesions in March 2016.    PET scan from 6/6/16 shows "Dramatic almost complete response to therapy."  Lovenox started after US lower ext 6/7/16 shows Acute complete occlusion of one of the left posterior tibial vein.Remote partial thrombus of the proximal left superficial femoral vein.  She is on Tarceva.   12/6/16 PET scan reveals "Stable right upper lobe lesion and right hilar lymph node. No new lesions identified. Trace left pleural effusion".  1/27/17 Renal u/s "Medical renal disease. Nonobstructive right nephrolithiasis"  1/31/17 PET - "Right upper lobe nodule and right hilar lymph node similar and very low grade activity.  There is no definite evidence of recurrence."   11/9/17 PET "In this patient with history of lung cancer, there is interval increase in size and hypermetabolism of a right upper lobe lung lesion concerning for recurrent disease. Additionally, there is interval appearance of a hypermetabolic paratracheal lymph node suspicious for metastatic " "disease"     She progressed on Tarceva She underwent EBUS on 12/5/17. Pathology revealed adenocarcinoma but T790m could not be done as quantity was not insufficient. Her PET scan from 1/30/18 revealed The patient's previously identified abnormal lesions is slightly greater uptake of FDG on today's study compared with prior exam. These findings are concerning for progression of disease"      She was hospitalized between 4/9/18-4/16/18. Her hospital course was as follows "Patient was admitted to hemonc service on 4/9 with acute hypoxic respiratory failure. She was requiring 4 L of nc on admission. She was started on moxi for CAP. She received one dose of ceftriaxone in the ED. On 2nd day of admission she spiked a fever. Her Hb was ~7 g/dl so she was transfused 1 unit of PRBCs. Over night she developed worsening of her symptoms with increased O2 requirements to 15 L HFNC. Her CXR showed worsening congestion. There was a concern of TRALI vs TACO. New 2D echo with diastolic dysfunction. She was given IV lasix pushes as needed and was started on dexamethasone.  Her abx was escalated to vanc and cefepime. She improved with diuresis and steroids. Pulmonary were consulted. Her O2 requirements continued to improve. On 4/15 she was switched to Augmentin. PT recommended discharging her to SNF but the patient refused and she wanted to go home with home health. She qualified for home oxygen so she was discharged on 3 L nc while at rest and 6 while active. Augmentin and dexamethasone were discontinued on discharge"     She was readmitted from 4/27 to 5/3/18 with who presented to the ED with a complaint of fatigue and worsening DELA CRUZ. Pt diagnosed PNA 4/9 and was discharged on 4/16 on 3L oxygen. She was initially placed on cefepime for 3 days followed by an add'l 2 days of Augmentin. Pulm was consulted with assistance as her oxygen requirements were increasing. She was placed on oral steroids, then trailed on 80mg TID solumedrol for " "2 days and will be discharged on a prednisone taper. She was also diuresed with 2 days of 40mg IV lasix with appropriate UOP resulting, improvement on repeat CXR. She was medically stable and appropriate for DC home with home health on 1L continuous O2. She will be seen for close f/u and may be able to come off oxygen at that time.      She discontinued Tagrisso in April 2018. Restaging scans from 6/4/18 revealed "Stable appearance of a spiculated right upper lobe pulmonary lesion.  Additional irregular focus superior to the lesion in the right lung apex is stable and may represent scar or additional lesion; although this was not hypermetabolic on prior PET-CT.  No new pulmonary lesions. 1.3 cm subcarinal lymph node, not hypermetabolic on prior PET-CT. Stable postsurgical changes of a Whipple procedure. Bilateral nonobstructing nephrolithiasis.  Stable sclerotic focus in the T5 vertebral body, possibly a bone island as this was not hypermetabolic on prior PET-CT. Small hiatal hernia. RECIST SUMMARY: Date of prior examination for comparison 01/30/2018 Lesion 1: Right upper lobe 1.3 cm Series 2 image 31 prior measurement 1.3 cm"  Bone scan also from 6/4/18 revealed no evidence of mets.     Her PET scan from 7/11/18 revealed "Right suprahilar lesion SUV max 3.76, previously 6.86. Pretracheal lymph node SUV max 2.55, previously 3.79. There is physiologic intracranial, head, and neck activity.  There is muscle activation.  There is vocal cord activation.  There is physiologic liver, spleen, GI and  activity.  There is a hiatal hernia.  Pelvic organs show nothing unusual.  No bone lesions are seen.  There are areas of benign appearing lung scar"  She notes fatigue.  She denies any nausea, vomiting, diarrhea, constipation, abdominal pain, weight loss or loss of appetite, chest pain, shortness of breath, leg swelling, fatigue, pain, headache, dizziness, or mood changes. Her ECOG PS is 2. She is accompanied by her  " "and son     She has been on Tarceva since 6/5/18. She has now completed SBRT to her right lung lesions.        HPI She comes in to review her PET scan from 4/23/2020 which reveals "Persistent but overall decreased hypermetabolic activity of the pretracheal lymph node suggesting favorable response to therapy.  Stable small focus of increased uptake in the right hilum.  No new hypermetabolic lesions. New 1.0 cm non hypermetabolic ground-glass nodule in the posterior segment of the right upper lobe and 0.7 cm non hypermetabolic ground-glass nodule within the left upper lobe.  Attention on follow-up examination"  She   Review of Systems    Objective:      Physical Exam      LABS:  WBC   Date Value Ref Range Status   04/23/2020 5.36 3.90 - 12.70 K/uL Final     Hemoglobin   Date Value Ref Range Status   04/23/2020 7.8 (L) 12.0 - 16.0 g/dL Final     POC Hematocrit   Date Value Ref Range Status   02/21/2020 30 (L) 36 - 54 %PCV Final     Hematocrit   Date Value Ref Range Status   04/23/2020 28.2 (L) 37.0 - 48.5 % Final     Platelets   Date Value Ref Range Status   04/23/2020 326 150 - 350 K/uL Final     Gran # (ANC)   Date Value Ref Range Status   04/23/2020 3.1 1.8 - 7.7 K/uL Final     Comment:     The ANC is based on a white cell differential from an   automated cell counter. It has not been microscopically   reviewed for the presence of abnormal cells. Clinical   correlation is required.         Chemistry        Component Value Date/Time     04/23/2020 0843    K 4.1 04/23/2020 0843     (H) 04/23/2020 0843    CO2 22 (L) 04/23/2020 0843    BUN 39 (H) 04/23/2020 0843    CREATININE 2.1 (H) 04/23/2020 0843    GLU 84 04/23/2020 0843        Component Value Date/Time    CALCIUM 9.2 04/23/2020 0843    ALKPHOS 66 04/23/2020 0843    AST 31 04/23/2020 0843    ALT 13 04/23/2020 0843    BILITOT 0.7 04/23/2020 0843    ESTGFRAFRICA 26.0 (A) 04/23/2020 0843    EGFRNONAA 22.5 (A) 04/23/2020 0843          Assessment:       1. " Adenocarcinoma of lung, right    2. Brain metastases    3. 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 Stage 3 chronic kidney disease        Plan:        1,2,3,4. She is doing well from cancer stand point. PET scan continues to reveal a response. She will continue on Tarceva and will return in 3 months with a repeat PET scans. She will receive Xgeva today and monthly.    Above care plan was discussed with patient and daughter on conference call and all questions were addressed to their satisfaction

## 2020-05-01 ENCOUNTER — TELEPHONE (OUTPATIENT)
Dept: HEMATOLOGY/ONCOLOGY | Facility: CLINIC | Age: 75
End: 2020-05-01

## 2020-05-01 ENCOUNTER — INFUSION (OUTPATIENT)
Dept: INFUSION THERAPY | Facility: HOSPITAL | Age: 75
End: 2020-05-01
Attending: INTERNAL MEDICINE
Payer: MEDICARE

## 2020-05-01 DIAGNOSIS — C34.31 MALIGNANT NEOPLASM OF LOWER LOBE OF RIGHT LUNG: Primary | ICD-10-CM

## 2020-05-01 PROCEDURE — 63600175 PHARM REV CODE 636 W HCPCS: Mod: JG,HCNC | Performed by: INTERNAL MEDICINE

## 2020-05-01 PROCEDURE — 96372 THER/PROPH/DIAG INJ SC/IM: CPT | Mod: HCNC

## 2020-05-01 RX ADMIN — DENOSUMAB 120 MG: 120 INJECTION SUBCUTANEOUS at 09:05

## 2020-05-01 NOTE — NURSING
Pt tolerated Xgeva injection to the abdomen today. NAD.declined AVS. Uses my Ochn[facility]. Discharged home. Pt stayed in w/c per pt request.Transportation was requested to bring pt to parking lot.

## 2020-05-01 NOTE — TELEPHONE ENCOUNTER
----- Message from Roseline Marlow sent at 5/1/2020  2:40 PM CDT -----  Contact: PT  PT called to speak with Zandra - has some questions to speak on    Callback: [phone]

## 2020-05-01 NOTE — TELEPHONE ENCOUNTER
Gave covid results. Would like to know if Dr. Vazquez and Dr. Carmona spoke and if she can do the scope. Would also like to know what Dr. Vazquez though the area in her lung was, and if it needs to be treated since it may be an infection.

## 2020-05-04 ENCOUNTER — PATIENT MESSAGE (OUTPATIENT)
Dept: OPTOMETRY | Facility: CLINIC | Age: 75
End: 2020-05-04

## 2020-05-04 NOTE — TELEPHONE ENCOUNTER
"Spoke with patient.  Informed her of dr hankins and dr sullivan's conversation---that EGD and cscope are needed.   She thanked nurse.      She states she forgot to mention to dr sullivan her skin is very "rough"--she states it isn't dry. She has been using lotion--and it was helping but not anymore. Denies pruritic rash or SJS symptoms.     She is requesting nurse to speak with dr sullivan--to see if she should be doing anything else.      Message routed to dr sullivan   "

## 2020-05-04 NOTE — TELEPHONE ENCOUNTER
Yes I spoke with Dr. Carmona, he agrees to scope her both EGD and colonoscopy. No need to treat the infection in lung, since very small and she is asymptomatic

## 2020-05-05 ENCOUNTER — PATIENT OUTREACH (OUTPATIENT)
Dept: ADMINISTRATIVE | Facility: OTHER | Age: 75
End: 2020-05-05

## 2020-05-06 ENCOUNTER — TELEPHONE (OUTPATIENT)
Dept: HEMATOLOGY/ONCOLOGY | Facility: CLINIC | Age: 75
End: 2020-05-06

## 2020-05-06 ENCOUNTER — TELEPHONE (OUTPATIENT)
Dept: GASTROENTEROLOGY | Facility: CLINIC | Age: 75
End: 2020-05-06

## 2020-05-06 NOTE — TELEPHONE ENCOUNTER
----- Message from Alina Sigala sent at 5/6/2020 11:49 AM CDT -----  Contact: Patient  Patient Advice/Staff Message     Caller name: Pt    Reason for call: Pt wants to speak with Zandra, wouldn't give a reason    Do you feel you need to be seen today:: No        Communication Preference:[phone]    Additional Information:

## 2020-05-06 NOTE — TELEPHONE ENCOUNTER
Spoke with patient and informed someone from endo would be reaching out to schedule procedure.  Pt verbalized understanding to all and has no further questions at this time.

## 2020-05-06 NOTE — TELEPHONE ENCOUNTER
Spoke to patient, she is wanting to know if she needs to call Dr. Carmona's office or if they will reach out to her. Let her know I would find out and one of the offices will be back in touch.

## 2020-05-08 RX ORDER — METOPROLOL SUCCINATE 50 MG/1
TABLET, EXTENDED RELEASE ORAL
Qty: 30 TABLET | Refills: 5 | Status: ON HOLD | OUTPATIENT
Start: 2020-05-08 | End: 2020-01-01 | Stop reason: HOSPADM

## 2020-05-11 ENCOUNTER — OFFICE VISIT (OUTPATIENT)
Dept: OPTOMETRY | Facility: CLINIC | Age: 75
End: 2020-05-11
Payer: MEDICARE

## 2020-05-11 ENCOUNTER — PATIENT OUTREACH (OUTPATIENT)
Dept: ADMINISTRATIVE | Facility: OTHER | Age: 75
End: 2020-05-11

## 2020-05-11 ENCOUNTER — OFFICE VISIT (OUTPATIENT)
Dept: PODIATRY | Facility: CLINIC | Age: 75
End: 2020-05-11
Payer: MEDICARE

## 2020-05-11 VITALS
SYSTOLIC BLOOD PRESSURE: 123 MMHG | BODY MASS INDEX: 22.42 KG/M2 | WEIGHT: 134.69 LBS | DIASTOLIC BLOOD PRESSURE: 63 MMHG | HEART RATE: 65 BPM

## 2020-05-11 DIAGNOSIS — M35.01 KERATITIS SICCA, BOTH EYES: ICD-10-CM

## 2020-05-11 DIAGNOSIS — Z96.1 PSEUDOPHAKIA OF BOTH EYES: ICD-10-CM

## 2020-05-11 DIAGNOSIS — H52.13 MYOPIA WITH PRESBYOPIA OF BOTH EYES: ICD-10-CM

## 2020-05-11 DIAGNOSIS — H04.123 DRY EYE SYNDROME OF BOTH EYES: ICD-10-CM

## 2020-05-11 DIAGNOSIS — H52.4 MYOPIA WITH PRESBYOPIA OF BOTH EYES: ICD-10-CM

## 2020-05-11 DIAGNOSIS — B35.1 ONYCHOMYCOSIS DUE TO DERMATOPHYTE: ICD-10-CM

## 2020-05-11 DIAGNOSIS — I10 ESSENTIAL HYPERTENSION: Primary | ICD-10-CM

## 2020-05-11 DIAGNOSIS — L60.9 DISEASE OF NAIL: Primary | ICD-10-CM

## 2020-05-11 PROCEDURE — 1100F PTFALLS ASSESS-DOCD GE2>/YR: CPT | Mod: HCNC,CPTII,S$GLB, | Performed by: PODIATRIST

## 2020-05-11 PROCEDURE — 92014 COMPRE OPH EXAM EST PT 1/>: CPT | Mod: HCNC,S$GLB,, | Performed by: OPTOMETRIST

## 2020-05-11 PROCEDURE — 3288F FALL RISK ASSESSMENT DOCD: CPT | Mod: HCNC,CPTII,S$GLB, | Performed by: PODIATRIST

## 2020-05-11 PROCEDURE — 3074F SYST BP LT 130 MM HG: CPT | Mod: HCNC,CPTII,S$GLB, | Performed by: PODIATRIST

## 2020-05-11 PROCEDURE — 1159F PR MEDICATION LIST DOCUMENTED IN MEDICAL RECORD: ICD-10-PCS | Mod: HCNC,S$GLB,, | Performed by: PODIATRIST

## 2020-05-11 PROCEDURE — 99999 PR PBB SHADOW E&M-EST. PATIENT-LVL II: ICD-10-PCS | Mod: PBBFAC,HCNC,, | Performed by: OPTOMETRIST

## 2020-05-11 PROCEDURE — 99999 PR PBB SHADOW E&M-EST. PATIENT-LVL II: CPT | Mod: PBBFAC,HCNC,, | Performed by: OPTOMETRIST

## 2020-05-11 PROCEDURE — 99203 PR OFFICE/OUTPT VISIT, NEW, LEVL III, 30-44 MIN: ICD-10-PCS | Mod: HCNC,S$GLB,, | Performed by: PODIATRIST

## 2020-05-11 PROCEDURE — 1126F AMNT PAIN NOTED NONE PRSNT: CPT | Mod: HCNC,S$GLB,, | Performed by: PODIATRIST

## 2020-05-11 PROCEDURE — 3078F DIAST BP <80 MM HG: CPT | Mod: HCNC,CPTII,S$GLB, | Performed by: PODIATRIST

## 2020-05-11 PROCEDURE — 3078F PR MOST RECENT DIASTOLIC BLOOD PRESSURE < 80 MM HG: ICD-10-PCS | Mod: HCNC,CPTII,S$GLB, | Performed by: PODIATRIST

## 2020-05-11 PROCEDURE — 3288F PR FALLS RISK ASSESSMENT DOCUMENTED: ICD-10-PCS | Mod: HCNC,CPTII,S$GLB, | Performed by: PODIATRIST

## 2020-05-11 PROCEDURE — 92014 PR EYE EXAM, EST PATIENT,COMPREHESV: ICD-10-PCS | Mod: HCNC,S$GLB,, | Performed by: OPTOMETRIST

## 2020-05-11 PROCEDURE — 92015 PR REFRACTION: ICD-10-PCS | Mod: HCNC,S$GLB,, | Performed by: OPTOMETRIST

## 2020-05-11 PROCEDURE — 99203 OFFICE O/P NEW LOW 30 MIN: CPT | Mod: HCNC,S$GLB,, | Performed by: PODIATRIST

## 2020-05-11 PROCEDURE — 1100F PR PT FALLS ASSESS DOC 2+ FALLS/FALL W/INJURY/YR: ICD-10-PCS | Mod: HCNC,CPTII,S$GLB, | Performed by: PODIATRIST

## 2020-05-11 PROCEDURE — 99999 PR PBB SHADOW E&M-EST. PATIENT-LVL III: ICD-10-PCS | Mod: PBBFAC,HCNC,, | Performed by: PODIATRIST

## 2020-05-11 PROCEDURE — 3074F PR MOST RECENT SYSTOLIC BLOOD PRESSURE < 130 MM HG: ICD-10-PCS | Mod: HCNC,CPTII,S$GLB, | Performed by: PODIATRIST

## 2020-05-11 PROCEDURE — 99999 PR PBB SHADOW E&M-EST. PATIENT-LVL III: CPT | Mod: PBBFAC,HCNC,, | Performed by: PODIATRIST

## 2020-05-11 PROCEDURE — 92015 DETERMINE REFRACTIVE STATE: CPT | Mod: HCNC,S$GLB,, | Performed by: OPTOMETRIST

## 2020-05-11 PROCEDURE — 1126F PR PAIN SEVERITY QUANTIFIED, NO PAIN PRESENT: ICD-10-PCS | Mod: HCNC,S$GLB,, | Performed by: PODIATRIST

## 2020-05-11 PROCEDURE — 1159F MED LIST DOCD IN RCRD: CPT | Mod: HCNC,S$GLB,, | Performed by: PODIATRIST

## 2020-05-11 RX ORDER — KETOCONAZOLE 20 MG/G
CREAM TOPICAL DAILY
Qty: 1 TUBE | Refills: 2 | Status: SHIPPED | OUTPATIENT
Start: 2020-05-11 | End: 2021-01-01

## 2020-05-11 RX ORDER — ATORVASTATIN CALCIUM 40 MG/1
TABLET, FILM COATED ORAL
Status: ON HOLD | COMMUNITY
Start: 2020-04-23 | End: 2020-06-02

## 2020-05-11 NOTE — PROGRESS NOTES
HPI     Last eye exam was 6/29/17 with Dr. Brunson.  Patient states decrease in vision since last exam. Didn't update glasses   rx after last visit. Occasionally sees floaters OU. OU constantly water.  Patient denies diplopia, headaches, flashes, and pain.    AT's QD OU    Last edited by Bia Lackey on 5/11/2020  1:32 PM. (History)            Assessment /Plan     For exam results, see Encounter Report.    Essential hypertension    Keratitis sicca, both eyes    Dry eye syndrome of both eyes    Myopia with presbyopia of both eyes    Pseudophakia of both eyes          1.  No retinopathy--monitor yearly.  BS control.  2-3.  Recommend artificial tears at least 3x/day OU.  Causing mild decrease in vision--educated pt.  4-5.  Bifocal rx given

## 2020-05-12 ENCOUNTER — CARE AT HOME (OUTPATIENT)
Dept: HOME HEALTH SERVICES | Facility: CLINIC | Age: 75
End: 2020-05-12
Payer: MEDICARE

## 2020-05-12 DIAGNOSIS — R06.02 SOB (SHORTNESS OF BREATH) ON EXERTION: ICD-10-CM

## 2020-05-12 DIAGNOSIS — R53.83 FATIGUE, UNSPECIFIED TYPE: ICD-10-CM

## 2020-05-12 DIAGNOSIS — R05.9 COUGH: ICD-10-CM

## 2020-05-12 DIAGNOSIS — R21 RASH OF BACK: ICD-10-CM

## 2020-05-12 DIAGNOSIS — R53.1 GENERALIZED WEAKNESS: ICD-10-CM

## 2020-05-12 DIAGNOSIS — Z71.89 COUNSELING REGARDING ADVANCE CARE PLANNING AND GOALS OF CARE: ICD-10-CM

## 2020-05-12 DIAGNOSIS — L27.0 ACNEIFORM DRUG ERUPTION: ICD-10-CM

## 2020-05-12 DIAGNOSIS — Z51.5 PALLIATIVE CARE ENCOUNTER: Primary | ICD-10-CM

## 2020-05-12 DIAGNOSIS — R63.4 WEIGHT LOSS: ICD-10-CM

## 2020-05-12 PROCEDURE — 3078F PR MOST RECENT DIASTOLIC BLOOD PRESSURE < 80 MM HG: ICD-10-PCS | Mod: CPTII,S$GLB,, | Performed by: NURSE PRACTITIONER

## 2020-05-12 PROCEDURE — 1100F PR PT FALLS ASSESS DOC 2+ FALLS/FALL W/INJURY/YR: ICD-10-PCS | Mod: CPTII,S$GLB,, | Performed by: NURSE PRACTITIONER

## 2020-05-12 PROCEDURE — 1126F PR PAIN SEVERITY QUANTIFIED, NO PAIN PRESENT: ICD-10-PCS | Mod: S$GLB,,, | Performed by: NURSE PRACTITIONER

## 2020-05-12 PROCEDURE — 1159F MED LIST DOCD IN RCRD: CPT | Mod: S$GLB,,, | Performed by: NURSE PRACTITIONER

## 2020-05-12 PROCEDURE — 1159F PR MEDICATION LIST DOCUMENTED IN MEDICAL RECORD: ICD-10-PCS | Mod: S$GLB,,, | Performed by: NURSE PRACTITIONER

## 2020-05-12 PROCEDURE — 1100F PTFALLS ASSESS-DOCD GE2>/YR: CPT | Mod: CPTII,S$GLB,, | Performed by: NURSE PRACTITIONER

## 2020-05-12 PROCEDURE — 3074F SYST BP LT 130 MM HG: CPT | Mod: CPTII,S$GLB,, | Performed by: NURSE PRACTITIONER

## 2020-05-12 PROCEDURE — 3074F PR MOST RECENT SYSTOLIC BLOOD PRESSURE < 130 MM HG: ICD-10-PCS | Mod: CPTII,S$GLB,, | Performed by: NURSE PRACTITIONER

## 2020-05-12 PROCEDURE — 3288F FALL RISK ASSESSMENT DOCD: CPT | Mod: CPTII,S$GLB,, | Performed by: NURSE PRACTITIONER

## 2020-05-12 PROCEDURE — 99497 ADVNCD CARE PLAN 30 MIN: CPT | Mod: 25,S$GLB,, | Performed by: NURSE PRACTITIONER

## 2020-05-12 PROCEDURE — 3078F DIAST BP <80 MM HG: CPT | Mod: CPTII,S$GLB,, | Performed by: NURSE PRACTITIONER

## 2020-05-12 PROCEDURE — 99497 PR ADVNCD CARE PLAN 30 MIN: ICD-10-PCS | Mod: 25,S$GLB,, | Performed by: NURSE PRACTITIONER

## 2020-05-12 PROCEDURE — 99348 PR HOME VISIT,ESTAB PATIENT,LEVEL II: ICD-10-PCS | Mod: S$GLB,,, | Performed by: NURSE PRACTITIONER

## 2020-05-12 PROCEDURE — 1126F AMNT PAIN NOTED NONE PRSNT: CPT | Mod: S$GLB,,, | Performed by: NURSE PRACTITIONER

## 2020-05-12 PROCEDURE — 99348 HOME/RES VST EST LOW MDM 30: CPT | Mod: S$GLB,,, | Performed by: NURSE PRACTITIONER

## 2020-05-12 PROCEDURE — 3288F PR FALLS RISK ASSESSMENT DOCUMENTED: ICD-10-PCS | Mod: CPTII,S$GLB,, | Performed by: NURSE PRACTITIONER

## 2020-05-12 RX ORDER — CLINDAMYCIN PHOSPHATE 10 MG/G
GEL TOPICAL
Qty: 60 G | Refills: 1 | Status: SHIPPED | OUTPATIENT
Start: 2020-05-12 | End: 2021-01-01

## 2020-05-12 NOTE — PROGRESS NOTES
[facility]  Palliative Care Home Visit    Visit Date: 2020  Encounter Provider: Kiya Landin DNP, FNP-C  PCP:  Olvin Mars MD    Subjective:      Patient ID: Naty St is a 74 y.o. female.    Consult Requested By:  Shaka Ann M.D.  Reason for Consult:  Palliative Care      Chief Complaint:  Palliative Care Follow-up visit    Outpatient Palliative Care Encounter:    Ms. Naty St is a 75 y/o female with PMHx of Lung Cancer metastatic to brain, pancreatic cancer (), breast cancer (), hypertension, blood clot in vein (on anticoagulant long-term use), seizures, stroke, psychiatric problem, brain surgery with tumor removal (2016), cholecystectomy, whipple (), oophorectomy (), hysterectomy (), and kidney stone surgery.     Social History:    Patient resides at home with her  and son. She reports she has been  for 31 years and has 4 children (not with current ). She has 2 siblings, 1 sister (lives in [place]) and 1 brother ().     Impression:      Patient seen today for follow-up palliative care visit. Patient's  is present. Patient is sitting on the sofa in her living room. She is awake, alert, and oriented x 3. No c/o pain.     She had radiation last in 2020. Denies pain but reports SOB with exertion. +cough.  Appetite is improving. She is taking Chemotherapy pill daily (Tarcevia).  Appears thinner, weight loss;however, she looks like she feels good today.        Goals of Care:    Reviewed [place] form and [employer] Advance Care Directives form with patient.   Patient completed [place] form. Would like to be DNR. Discussed hospice benefits but patient would like to continue with palliative care.     Discussed goals of care with patient. She would like to feel better and continue to feel better. Reports she doesn't want to give up but to keep fighting and move forward.      PLAN:  1. Palliative care to follow-up with  patient on 6/23  2. Continue all medications  3. Patient to complete advance directives  4. Offer supportive to care to the patient and family  5. Refill Clindamycin gel to apply to back prn for drug eruption   6. In emergency, call 911 or go to ED; notify PCP's office    Review of Systems   Constitutional: Negative for activity change, appetite change (appetite has improved), chills, fatigue and fever.   HENT: Positive for rhinorrhea. Negative for congestion, postnasal drip, sinus pressure, sinus pain, sneezing, sore throat and trouble swallowing.    Eyes: Positive for visual disturbance (glasses).   Respiratory: Positive for cough (occasional NP cough) and shortness of breath (SOB with exertion). Negative for choking, chest tightness and wheezing.    Cardiovascular: Negative for chest pain, palpitations and leg swelling.   Gastrointestinal: Negative for abdominal distention, abdominal pain, blood in stool, constipation, diarrhea, nausea and vomiting.        +appetite improving  Endocrine: Negative for polyuria.   Genitourinary: Negative for difficulty urinating and hematuria.   Musculoskeletal: Negative for arthralgias, back pain, gait problem, joint swelling, myalgias, neck pain and neck stiffness.   Skin: Positive for pallor. Negative for rash and wound.   Neurological: Positive for weakness (generalized weakness). Negative for dizziness, tremors, seizures, syncope, light-headedness and headaches.   Hematological: Bruises/bleeds easily.   Psychiatric/Behavioral: Negative for confusion, hallucinations, self-injury, sleep disturbance and suicidal ideas. The patient is nervous/anxious.        Assessments:  · Environmental: single story home; good lighting; uncluttered; lives with spouse and son  · Functional Status: independent of all ADLs,   · Safety: someone present at all times; fall precautions  · Nutritional: 3 meals; snacks, protein shakes  · Home Health/DME/Supplies: cane, tub chair, toilet chair,  walker    Symptom Assessment (ESAS 0-10 scale)     ESAS 0 1 2 3 4 5 6 7 8 9 10   Pain X             Dyspnea   X           Anxiety x             Nausea X             Depression      x         Anorexia     X         Fatigue     x         Insomnia X             Restlessness  X             Agitation X               Constipation    Yes (at times)  Bowel Management Plan (BMP): no  Diarrhea        no  Comments: don't need anything; regular BMs; 5/11 normal    Performance Status:   PPS Score 60  Karnofsky Score:  60  EGOC:  2    History:  Past Medical History:   Diagnosis Date    Anticoagulant long-term use     Blood clot in vein 06/2016    Breast cancer 1994    Cataract     Hypertension     Lung cancer     Lung cancer metastatic to brain     Pancreatic cancer 1998    Posterior capsular opacification, left eye     Psychiatric problem     Seizures 01/25/2016    Stroke     Therapy      Family History   Problem Relation Age of Onset    Breast cancer Mother     Lung cancer Mother     Leukemia Paternal Grandfather     Stomach cancer Paternal Grandmother     Colon cancer Neg Hx     Ovarian cancer Neg Hx      Past Surgical History:   Procedure Laterality Date    BONE BIOSPY  3/9/16    BRAIN SURGERY  1/12/16    tumor removal 1/12/16    BREAST LUMPECTOMY  1998    left    CATARACT EXTRACTION Bilateral 2004    yag OU    CHOLECYSTECTOMY  1998    COLONOSCOPY N/A 11/13/2015    Procedure: COLONOSCOPY;  Surgeon: Alex Carmona MD;  Location: [facility] ([address] [place] [address]);  Service: Endoscopy;  Laterality: N/A;  2 year f/u    COLONOSCOPY N/A 11/7/2018    Procedure: COLONOSCOPY;  Surgeon: Jim Raman MD;  Location: [facility] ([address] [place] [address]);  Service: Endoscopy;  Laterality: N/A;  Eliquis - per Dr. Vazquez -ok to hold Eliquis x 2 days prior to colon- ERW    cysto and right ureteral stent  4/27/12    ecoli  2010    removal of blockage, done throat, then through the liver.    HYSTERECTOMY  1974    KIDNEY STONE SURGERY   2010--laser    left eswl  6/20/12    OOPHORECTOMY  4/25/2012    Laparoscopic BSO, lysis of adhesions, cystoscopy greater than 35mins     WHIPPLE PROCEDURE W/ LAPAROSCOPY  1998     Review of patient's allergies indicates:   Allergen Reactions    Tobradex [tobramycin-dexamethasone] Swelling    Iodinated contrast media Hives    Latex Rash and Hives    Phenytoin sodium extended Other (See Comments) and Rash       Medications:    Current Outpatient Medications:     albuterol-ipratropium (DUO-NEB) 2.5 mg-0.5 mg/3 mL nebulizer solution, Take 3 mLs by nebulization every 6 (six) hours as needed for Wheezing or Shortness of Breath. Rescue, Disp: 1 Box, Rfl: 3    amitriptyline (ELAVIL) 50 MG tablet, Take 1 tablet (50 mg total) by mouth every evening., Disp: 90 tablet, Rfl: 12    calcium carbonate (OS-UNIQUE) 500 mg calcium (1,250 mg) tablet, Take 2 tablets (1,000 mg total) by mouth once daily., Disp: , Rfl: 0    clindamycin phosphate 1% (CLINDAGEL) 1 % gel, APPLY TOPICALLY TWICE DAILY, Disp: 60 g, Rfl: 6    CREON CpDR, TAKE ONE CAPSULE BY MOUTH THREE TIMES A DAY WITH MEALS, Disp: 270 capsule, Rfl: 3    denosumab (XGEVA) 120 mg/1.7 mL (70 mg/mL) Soln, Inject 120 mg into the skin every 28 days., Disp: , Rfl:     dronabinol (MARINOL) 5 MG capsule, Take 1 capsule (5 mg total) by mouth 2 (two) times daily before meals., Disp: 60 capsule, Rfl: 3    ELIQUIS 5 mg Tab, TAKE ONE TABLET BY MOUTH TWICE DAILY, Disp: 60 tablet, Rfl: 6    erlotinib (TARCEVA) 150 MG tablet, Take 1 tablet (150 mg total) by mouth once daily Hold medication until told to restart by Dr. Vazquez., Disp: 30 tablet, Rfl: 11    furosemide (LASIX) 40 MG tablet, Take 1 tablet (40 mg total) by mouth daily as needed (swelling)., Disp: 30 tablet, Rfl: 1    levETIRAcetam (KEPPRA) 500 MG Tab, TAKE ONE TABLET BY MOUTH TWICE DAILY, Disp: 180 tablet, Rfl: 3    LORazepam (ATIVAN) 1 MG tablet, Take 1 tablet (1 mg total) by mouth 2 (two) times daily., Disp: 60  tablet, Rfl: 5    losartan (COZAAR) 50 MG tablet, TAKE ONE TABLET BY MOUTH TWICE DAILY, Disp: 60 tablet, Rfl: 10    metoprolol succinate (TOPROL-XL) 50 MG 24 hr tablet, TAKE ONE TABLET BY MOUTH ONCE DAILY. hold UNTIL your appointment WITH your pcp. heart rate and blood pressure has been normal off OF this me, Disp: 30 tablet, Rfl: 11    multivitamin (THERAGRAN) per tablet, Take 1 tablet by mouth once daily., Disp: , Rfl:     MYRBETRIQ 50 mg Tb24, TAKE ONE TABLET BY MOUTH ONCE DAILY, Disp: 30 tablet, Rfl: 11    polyethylene glycol (GLYCOLAX) 17 gram/dose powder, , Disp: , Rfl:     senna-docusate 8.6-50 mg (SENNA LAXATIVE-STOOL SOFTENER) 8.6-50 mg per tablet, Take 1 tablet by mouth once daily., Disp: , Rfl:     24h Oral Morphine Equivalents (OME):  n/a    Objective:     Physical Exam:    There is no height or weight on file to calculate BMI.    Physical Exam   Constitutional: She is oriented to person, place, and time. She appears well-developed and well-nourished. No distress.   Sitting on sofa in living room. Awake, alert, and oriented x 3; forgetful; appears to feel well.   HENT:   Head: Normocephalic and atraumatic.   Nose: Nose normal.   Mouth/Throat: No oropharyngeal exudate.   Eyes: Conjunctivae and EOM are normal. No scleral icterus.   Neck: Normal range of motion. Neck supple. No JVD present. No thyromegaly present.   Cardiovascular: Normal rate, regular rhythm, normal heart sounds and intact distal pulses. Exam reveals no gallop. +murmur grade II/VI  Pulmonary/Chest: Effort normal. No respiratory distress. She has no rales. She exhibits no tenderness.   SOB with exertion ; BBS CTA  Abdominal: Soft. Bowel sounds are normal. She exhibits no distension. There is no tenderness. No hernia.   Musculoskeletal: Normal range of motion. She exhibits no edema or tenderness.   Neurological: She is alert and oriented to person, place, and time.   Forgetful at times   Skin: Skin is warm and dry. Capillary refill  takes less than 2 seconds. No rash noted. No erythema. No pallor.   Psychiatric: She has a normal mood and affect. Her behavior is normal. Judgment and thought content normal.   Nursing note and vitals reviewed.      Labs:  CBC:   WBC   Date Value Ref Range Status   02/24/2020 7.54 3.90 - 12.70 K/uL Final      82 -  Hemoglobin   Date Value Ref Range Status   02/24/2020 9.9 (L) 12.0 - 16.0 g/dL Final   FT  POC Hematocrit   Date Value Ref Range Status   02/21/2020 30 (L) 36 - 54 %PCV Final     Hematocrit   Date Value Ref Range Status   02/24/2020 33.0 (L) 37.0 - 48.5 % Final   e Mean Corpuscular Volume   Date Value Ref Range Status   02/24/2020 86 82 - 98 fL Final   Value Ref Range Status   02/24/2020 3.2 (L) 3.5 - 5.2 g/dL Final     Protein:   Total Protein   Date Value Ref Range Status   02/24/2020 7.4 6.0 - 8.4 g/dL Final       Radiology:  I have reviewed all pertinent imaging results/findings within the past 24 hours.    Psychosocial/Cultural/Spiritual:  · F- [place] and Belief:  Christian   · I - Importance: yes  · C - Community: [facility] [employer]  · A - Address in Care: Goes to [employer] occasionally; no spiritual needs identified      Assessment:     1. Palliative care encounter  2. Counseling regarding Advance directives and goals of care  3. Debility  4. Rash of back  5. cough  6. SOB on exertion  7. Fatigue, unspecified type  8. Weight loss  9. Acneiform drug eruption      Plan:     Ethical / Legal: Advance Care Planning   · Surrogate decision maker:  Name: Javier St, Relationship:   · Code Status:  DNR  · [place]:  Left form at patient's house to review  · Other advance directive: no , left [facility] Advance Directives at patient's house  · Capacity to make medical decisions:  yes,   · Conflict: none         Were controlled substances prescribed?  No     >60min of 75 min visit spent in chart review,  symptom assessment, evaluation and treatment, and coordination of care and emotional  support.    >Remaining 15 mins of 75 min visit spent discussing palliative care, advance directives, goals of care, hospice and hospice benefits.      Follow Up Appointments:   Future Appointments   Date Time Provider Department Center   3/5/2020  1:00 PM JULIO UROLOGY [facility]ST[facility]   [facility]18[facility]2020  [facility]   3/18/2020 10:00 AM Devika Chappell PA-C [facility] HEM ONC [facility]   3/18/2020 10:30 AM INJECTION, [facility] INFUSION [facility]   3/19/2020  1:30 PM Nel Ramirez, JENIFER [address]   3/27/2020 10:00 AM PALLIATIVE AND SUPPORT CARE-HEMOC [facility] HEM ONC [facility]   4/23/2020  9:30 AM [facility] PET CT LIMIT 500 LBS [facility] CT [facility]   4/30/2020  8:30 AM LAB[facility] CANCER BLDG [facility]   4/30/2020  9:40 AM Karrie Vazquez MD [facility] HEM ONC [facility]   4/30/2020 10:00 AM INJECTION, [facility] INFUSION [facility]     Patient and family agreed via verbal consent to access medical records and for assessment and treatment during this visit.      Attestation: Screening criteria to assess the level of the patient's risk for infection with COVID-19 as recommended by the CDC at the time of the above documented home visit concluded appropriateness to proceed.     Patient reports she has SOB with exertion, +NP cough; denies fever, sore throat, chest pain, body aches, diarrhea.    Patient reports she has not been out of the country in the last 14 days    Universal precautions were maintained at all times, including provider use of >60% alcohol gel hand  immediately prior to entry and upon departing the patient's home as well as cleaning of equipment used in home visit with antibacterial/germicidal disposable wipes.    Facial mask worn throughout entire visit    Signature:  Kiya Landin, PADMINI, FNP-C  [facility]

## 2020-05-18 ENCOUNTER — TELEPHONE (OUTPATIENT)
Dept: ENDOSCOPY | Facility: HOSPITAL | Age: 75
End: 2020-05-18

## 2020-05-18 DIAGNOSIS — Z12.11 SPECIAL SCREENING FOR MALIGNANT NEOPLASMS, COLON: Primary | ICD-10-CM

## 2020-05-18 DIAGNOSIS — D50.0 IRON DEFICIENCY ANEMIA DUE TO CHRONIC BLOOD LOSS: Primary | ICD-10-CM

## 2020-05-18 RX ORDER — POLYETHYLENE GLYCOL 3350, SODIUM SULFATE ANHYDROUS, SODIUM BICARBONATE, SODIUM CHLORIDE, POTASSIUM CHLORIDE 236; 22.74; 6.74; 5.86; 2.97 G/4L; G/4L; G/4L; G/4L; G/4L
4 POWDER, FOR SOLUTION ORAL ONCE
Qty: 4000 ML | Refills: 0 | Status: SHIPPED | OUTPATIENT
Start: 2020-05-18 | End: 2020-05-18

## 2020-05-18 NOTE — TELEPHONE ENCOUNTER
Dr. Vazquez,  Patient needs to be scheduled for EGD/Colonoscopy procedures.   Is patient okay to hold Eliquis for 2 days prior to procedures as recommended per Endoscopy protocol?  Please advise.    Thanks,  Toña

## 2020-05-18 NOTE — TELEPHONE ENCOUNTER
Karrie Vazquez MD 2 minutes ago (2:51 PM)         Yes that is fine. Thank you          Documentation

## 2020-05-19 NOTE — PROGRESS NOTES
Subjective:      Patient ID: Naty St is a 74 y.o. female.    Chief Complaint: Foot Problem (discoloration of nail both feet )    Naty is a 74 y.o. female who presents to the clinic complaining of thick and discolored toenails on both feet. Naty is inquiring about treatment options.      Review of Systems   Constitution: Negative for chills, decreased appetite, fever and malaise/fatigue.   HENT: Negative for congestion, hearing loss, nosebleeds and tinnitus.    Eyes: Negative for double vision, pain, photophobia and visual disturbance.   Cardiovascular: Negative for chest pain, claudication, cyanosis and leg swelling.   Respiratory: Negative for cough, hemoptysis, shortness of breath and wheezing.    Endocrine: Negative for cold intolerance and heat intolerance.   Hematologic/Lymphatic: Negative for adenopathy and bleeding problem.   Skin: Positive for color change, dry skin and nail changes. Negative for itching and suspicious lesions.   Musculoskeletal: Positive for arthritis. Negative for joint pain, myalgias and stiffness.   Gastrointestinal: Negative for abdominal pain, jaundice, nausea and vomiting.   Genitourinary: Negative for dysuria, frequency and hematuria.   Neurological: Negative for difficulty with concentration, loss of balance, numbness, paresthesias and sensory change.   Psychiatric/Behavioral: Negative for altered mental status, hallucinations and suicidal ideas. The patient is not nervous/anxious.    Allergic/Immunologic: Negative for environmental allergies and persistent infections.           Objective:      Physical Exam   Constitutional: She is oriented to person, place, and time. She appears well-developed and well-nourished.   HENT:   Head: Normocephalic and atraumatic.   Cardiovascular:   Pulses:       Dorsalis pedis pulses are 2+ on the right side, and 2+ on the left side.        Posterior tibial pulses are 2+ on the right side, and 2+ on the left side.    Pulmonary/Chest: Effort normal.   Musculoskeletal: Normal range of motion.   Inspection and palpation of the muscles joints and bones of both lower extremities reveal that muscle strength for the anterior lateral and posterior muscle groups and intrinsic muscle groups of the foot are all 5 over 5 symmetrical.  Ankle subtalar midtarsal and digital joint range of motion are within normal limits, nonpainful, without crepitus or effusion.  Patient exhibits a normal angle and base of gait.  Palpation of the tendons reveal no defects.   Neurological: She is alert and oriented to person, place, and time.   Sharp dull light touch vibratory proprioceptive sensation are intact bilaterally.  Deep tendon reflexes to patellar and Achilles tendon are symmetrical 2 over 4 bilaterally.  No ankle clonus or Babinski reflexes noted bilaterally.  Coordination is normal to both feet and lower extremities.   Skin: Skin is warm and dry. Capillary refill takes 2 to 3 seconds.   Skin turgor is normal bilaterally.  Skin texture is dry bilateral with discolored dystrophic and thickened bilateral hallux nails with mild tenderness associated.   Psychiatric: She has a normal mood and affect. Her behavior is normal.   Vitals reviewed.            Assessment:       Encounter Diagnoses   Name Primary?    Disease of nail Yes    Onychomycosis due to dermatophyte          Plan:       Naty was seen today for foot problem.    Diagnoses and all orders for this visit:    Disease of nail    Onychomycosis due to dermatophyte    Other orders  -     ketoconazole (NIZORAL) 2 % cream; Apply topically once daily.      I counseled the patient on her conditions, their implications and medical management.    Conservative and surgical options discussed in detail, begin topical antifungal therapy at this point.  May consider nail avulsion in the future if tenderness continues or infection ensues.  Follow-up in 3-6 months.    The nature of the condition, options  for management, as well as potential risks and complications were discussed in detail with patient. Patient was amenable to my recommendations and left my office fully informed and will follow up as instructed or sooner if necessary.        .

## 2020-05-21 ENCOUNTER — PES CALL (OUTPATIENT)
Dept: ADMINISTRATIVE | Facility: CLINIC | Age: 75
End: 2020-05-21

## 2020-05-25 ENCOUNTER — TELEPHONE (OUTPATIENT)
Dept: PHARMACY | Facility: CLINIC | Age: 75
End: 2020-05-25

## 2020-05-25 ENCOUNTER — TELEPHONE (OUTPATIENT)
Dept: GASTROENTEROLOGY | Facility: CLINIC | Age: 75
End: 2020-05-25

## 2020-05-25 ENCOUNTER — TELEPHONE (OUTPATIENT)
Dept: OPTOMETRY | Facility: CLINIC | Age: 75
End: 2020-05-25

## 2020-05-25 DIAGNOSIS — C34.31 MALIGNANT NEOPLASM OF LOWER LOBE OF RIGHT LUNG: ICD-10-CM

## 2020-05-25 DIAGNOSIS — C34.90 ADENOCARCINOMA OF LUNG, UNSPECIFIED LATERALITY: Primary | ICD-10-CM

## 2020-05-25 RX ORDER — ERLOTINIB HYDROCHLORIDE 150 MG/1
150 TABLET, FILM COATED ORAL DAILY
Qty: 30 TABLET | Refills: 3 | Status: SHIPPED | OUTPATIENT
Start: 2020-05-25 | End: 2020-07-15 | Stop reason: SDUPTHER

## 2020-05-25 RX ORDER — ERLOTINIB HYDROCHLORIDE 150 MG/1
TABLET, FILM COATED ORAL
Qty: 60 TABLET | Refills: 3 | Status: SHIPPED | OUTPATIENT
Start: 2020-05-25 | End: 2020-05-25

## 2020-05-25 NOTE — TELEPHONE ENCOUNTER
----- Message from Maya Young sent at 5/25/2020  9:59 AM CDT -----  Contact: self @ [phone]  Pt says she is scheduled for a colonoscopy on 6-1-20.  Pt would like to speak with someone concerning her previous issue with constipation.  pls call.

## 2020-05-25 NOTE — TELEPHONE ENCOUNTER
MA spoke with patient.     She is not currently constipated and not experiencing rectal prolapse. She will keep her scheduled appointment.     MA spoke with Toña/ and she is aware of the patient's history with constipation.

## 2020-05-25 NOTE — TELEPHONE ENCOUNTER
----- Message from Melonie Cramer sent at 5/25/2020 10:05 AM CDT -----  Contact: PT   PT had an appointment with the doctor on 5/11 and she gave her a sheet with her results on it but the PT can't seem to find the paper now. Wanted to know if the doctor could resend her those results.     Callback: [phone]

## 2020-05-25 NOTE — TELEPHONE ENCOUNTER
Informed Patient  that [facility] received prescription for Tarceva. She said she fills this with [facility] and would like to continue filling it there.

## 2020-05-26 DIAGNOSIS — N18.30 CHRONIC KIDNEY DISEASE, STAGE III (MODERATE): Primary | ICD-10-CM

## 2020-05-27 RX ORDER — FUROSEMIDE 40 MG/1
40 TABLET ORAL DAILY PRN
Qty: 30 TABLET | Refills: 1 | Status: SHIPPED | OUTPATIENT
Start: 2020-05-27 | End: 2020-08-05

## 2020-05-28 ENCOUNTER — TELEPHONE (OUTPATIENT)
Dept: PULMONOLOGY | Facility: CLINIC | Age: 75
End: 2020-05-28

## 2020-05-29 ENCOUNTER — TELEPHONE (OUTPATIENT)
Dept: HEMATOLOGY/ONCOLOGY | Facility: CLINIC | Age: 75
End: 2020-05-29

## 2020-05-29 ENCOUNTER — CLINICAL SUPPORT (OUTPATIENT)
Dept: HEMATOLOGY/ONCOLOGY | Facility: CLINIC | Age: 75
End: 2020-05-29
Payer: MEDICARE

## 2020-05-29 ENCOUNTER — LAB VISIT (OUTPATIENT)
Dept: LAB | Facility: HOSPITAL | Age: 75
End: 2020-05-29
Attending: INTERNAL MEDICINE
Payer: MEDICARE

## 2020-05-29 DIAGNOSIS — C34.91 ADENOCARCINOMA OF LUNG, RIGHT: ICD-10-CM

## 2020-05-29 DIAGNOSIS — Z13.9 SCREENING FOR CONDITION: ICD-10-CM

## 2020-05-29 LAB
ALBUMIN SERPL BCP-MCNC: 3.2 G/DL (ref 3.5–5.2)
ALP SERPL-CCNC: 55 U/L (ref 55–135)
ALT SERPL W/O P-5'-P-CCNC: 15 U/L (ref 10–44)
ANION GAP SERPL CALC-SCNC: 9 MMOL/L (ref 8–16)
AST SERPL-CCNC: 23 U/L (ref 10–40)
BILIRUB SERPL-MCNC: 0.8 MG/DL (ref 0.1–1)
BUN SERPL-MCNC: 56 MG/DL (ref 8–23)
CALCIUM SERPL-MCNC: 8.4 MG/DL (ref 8.7–10.5)
CHLORIDE SERPL-SCNC: 111 MMOL/L (ref 95–110)
CO2 SERPL-SCNC: 17 MMOL/L (ref 23–29)
CREAT SERPL-MCNC: 2.3 MG/DL (ref 0.5–1.4)
EST. GFR  (AFRICAN AMERICAN): 23.3 ML/MIN/1.73 M^2
EST. GFR  (NON AFRICAN AMERICAN): 20.2 ML/MIN/1.73 M^2
GLUCOSE SERPL-MCNC: 89 MG/DL (ref 70–110)
POTASSIUM SERPL-SCNC: 4.2 MMOL/L (ref 3.5–5.1)
PROT SERPL-MCNC: 6.3 G/DL (ref 6–8.4)
SARS-COV-2 RNA RESP QL NAA+PROBE: NOT DETECTED
SODIUM SERPL-SCNC: 137 MMOL/L (ref 136–145)

## 2020-05-29 PROCEDURE — 80053 COMPREHEN METABOLIC PANEL: CPT | Mod: HCNC

## 2020-05-29 PROCEDURE — 36415 COLL VENOUS BLD VENIPUNCTURE: CPT | Mod: HCNC

## 2020-05-29 PROCEDURE — U0003 INFECTIOUS AGENT DETECTION BY NUCLEIC ACID (DNA OR RNA); SEVERE ACUTE RESPIRATORY SYNDROME CORONAVIRUS 2 (SARS-COV-2) (CORONAVIRUS DISEASE [COVID-19]), AMPLIFIED PROBE TECHNIQUE, MAKING USE OF HIGH THROUGHPUT TECHNOLOGIES AS DESCRIBED BY CMS-2020-01-R: HCPCS | Mod: HCNC

## 2020-05-29 NOTE — TELEPHONE ENCOUNTER
----- Message from Joe Ferrera sent at 5/29/2020  9:56 AM CDT -----  Contact: Pt  Pt just called to say she is running late and  will be there at 10:30am for her 10:10am lab.    Pt can be reached at [phone].    Thanks

## 2020-05-30 ENCOUNTER — LAB VISIT (OUTPATIENT)
Dept: INTERNAL MEDICINE | Facility: CLINIC | Age: 75
End: 2020-05-30
Payer: MEDICARE

## 2020-05-30 DIAGNOSIS — D50.0 IRON DEFICIENCY ANEMIA DUE TO CHRONIC BLOOD LOSS: ICD-10-CM

## 2020-05-30 LAB — SARS-COV-2 RNA RESP QL NAA+PROBE: NOT DETECTED

## 2020-05-30 PROCEDURE — U0003 INFECTIOUS AGENT DETECTION BY NUCLEIC ACID (DNA OR RNA); SEVERE ACUTE RESPIRATORY SYNDROME CORONAVIRUS 2 (SARS-COV-2) (CORONAVIRUS DISEASE [COVID-19]), AMPLIFIED PROBE TECHNIQUE, MAKING USE OF HIGH THROUGHPUT TECHNOLOGIES AS DESCRIBED BY CMS-2020-01-R: HCPCS | Mod: HCNC

## 2020-05-31 VITALS
RESPIRATION RATE: 20 BRPM | TEMPERATURE: 98 F | OXYGEN SATURATION: 93 % | DIASTOLIC BLOOD PRESSURE: 60 MMHG | SYSTOLIC BLOOD PRESSURE: 102 MMHG | HEART RATE: 61 BPM

## 2020-05-31 NOTE — PATIENT INSTRUCTIONS
INSTRUCTIONS:    1. Follow-up with palliative care NP in 4 weeks on 6/23  2. Continue all medications  3. Fall precautions at all times  4. F/u with PCP as needed  5. Refill Rx for Clindamycin 1% gel to apply to back for drug eruption rash  6. Family and patient to practice social distancing  7. Patient to have 6 feet between each other  8. In an Emergency, call 911 or go to ED; notify PCP's office

## 2020-06-01 ENCOUNTER — TELEPHONE (OUTPATIENT)
Dept: ENDOSCOPY | Facility: HOSPITAL | Age: 75
End: 2020-06-01

## 2020-06-01 ENCOUNTER — HOSPITAL ENCOUNTER (OUTPATIENT)
Facility: HOSPITAL | Age: 75
Discharge: HOME | End: 2020-06-01
Attending: INTERNAL MEDICINE | Admitting: INTERNAL MEDICINE
Payer: MEDICARE

## 2020-06-01 DIAGNOSIS — Z12.11 SPECIAL SCREENING FOR MALIGNANT NEOPLASMS, COLON: Primary | ICD-10-CM

## 2020-06-01 DIAGNOSIS — D50.9 IRON DEFICIENCY ANEMIA, UNSPECIFIED IRON DEFICIENCY ANEMIA TYPE: Primary | ICD-10-CM

## 2020-06-01 LAB — SARS-COV-2 RDRP RESP QL NAA+PROBE: NEGATIVE

## 2020-06-01 PROCEDURE — U0002 COVID-19 LAB TEST NON-CDC: HCPCS | Mod: HCNC

## 2020-06-01 RX ORDER — SODIUM CHLORIDE 0.9 % (FLUSH) 0.9 %
10 SYRINGE (ML) INJECTION
Status: CANCELLED | OUTPATIENT
Start: 2020-06-01

## 2020-06-01 RX ORDER — SODIUM CHLORIDE 9 MG/ML
INJECTION, SOLUTION INTRAVENOUS CONTINUOUS
Status: DISCONTINUED | OUTPATIENT
Start: 2020-06-01 | End: 2020-06-01 | Stop reason: HOSPADM

## 2020-06-01 RX ORDER — POLYETHYLENE GLYCOL 3350, SODIUM SULFATE ANHYDROUS, SODIUM BICARBONATE, SODIUM CHLORIDE, POTASSIUM CHLORIDE 236; 22.74; 6.74; 5.86; 2.97 G/4L; G/4L; G/4L; G/4L; G/4L
4 POWDER, FOR SOLUTION ORAL ONCE
Qty: 4000 ML | Refills: 0 | Status: SHIPPED | OUTPATIENT
Start: 2020-06-01 | End: 2020-06-01

## 2020-06-01 NOTE — DISCHARGE INSTRUCTIONS

## 2020-06-01 NOTE — TELEPHONE ENCOUNTER
Dr. Carmona,  Patient is rescheduled for EGD/Colonoscopy procedures for tomorrow 6/2/20 at 1100 on 2nd floor Endoscopy Unit with Dr. Medina.      MD Franchesca Rico RN             This is the patient today that needs to be rearranged for tomorrow due to concern regarding inadequate bowel prep.  New case request ordered.  She will need another prep.  I told he to keep taking it until there is just clear liquid coming out of the rectum.

## 2020-06-01 NOTE — H&P
Patient presented today for an EGD and colonoscopy to assess for iron deficiency anemia and history of multiple colon polyps.  She reports bowel, thick stool following the bowel prep.  Last BM was about 5 hours ago.  Her last colonoscopy in 2018 had to be aborted due to poor prep.  I recommend that we cancel her procedure today and repeat the bowel prep tonight.  Reschedule for tomorrow.

## 2020-06-01 NOTE — TELEPHONE ENCOUNTER
Dr. Vazquez,    Patient is rescheduled for EGD and Colonoscopy procedures for tomorrow 6/2/20 due to poor bowel prep.   Patient has already held her Eliquis for two days since she was originally scheduled today 6/1/20 for procedures.  Is it okay for patient to hold Eliquis today for procedures rescheduled for tomorrow, 6/2/20?  Please advise.    Thanks,  Toña

## 2020-06-02 ENCOUNTER — ANESTHESIA EVENT (OUTPATIENT)
Dept: ENDOSCOPY | Facility: HOSPITAL | Age: 75
End: 2020-06-02
Payer: MEDICARE

## 2020-06-02 ENCOUNTER — ANESTHESIA (OUTPATIENT)
Dept: ENDOSCOPY | Facility: HOSPITAL | Age: 75
End: 2020-06-02
Payer: MEDICARE

## 2020-06-02 ENCOUNTER — TELEPHONE (OUTPATIENT)
Dept: GASTROENTEROLOGY | Facility: CLINIC | Age: 75
End: 2020-06-02

## 2020-06-02 ENCOUNTER — TELEPHONE (OUTPATIENT)
Dept: ENDOSCOPY | Facility: HOSPITAL | Age: 75
End: 2020-06-02

## 2020-06-02 ENCOUNTER — HOSPITAL ENCOUNTER (OUTPATIENT)
Facility: HOSPITAL | Age: 75
Discharge: HOME | End: 2020-06-02
Attending: INTERNAL MEDICINE | Admitting: INTERNAL MEDICINE
Payer: MEDICARE

## 2020-06-02 ENCOUNTER — TELEPHONE (OUTPATIENT)
Dept: SURGERY | Facility: CLINIC | Age: 75
End: 2020-06-02

## 2020-06-02 VITALS
DIASTOLIC BLOOD PRESSURE: 65 MMHG | RESPIRATION RATE: 20 BRPM | BODY MASS INDEX: 22.49 KG/M2 | HEART RATE: 80 BPM | HEIGHT: 65 IN | WEIGHT: 135 LBS | SYSTOLIC BLOOD PRESSURE: 140 MMHG | OXYGEN SATURATION: 96 % | TEMPERATURE: 98 F

## 2020-06-02 DIAGNOSIS — Z79.01 LONG TERM CURRENT USE OF ANTICOAGULANT: ICD-10-CM

## 2020-06-02 DIAGNOSIS — Z79.899 ENCOUNTER FOR MONITORING LONG-TERM PROTON PUMP INHIBITOR THERAPY: ICD-10-CM

## 2020-06-02 DIAGNOSIS — D50.9 IRON DEFICIENCY ANEMIA, UNSPECIFIED IRON DEFICIENCY ANEMIA TYPE: ICD-10-CM

## 2020-06-02 DIAGNOSIS — K62.6 SOLITARY RECTAL ULCER: Primary | ICD-10-CM

## 2020-06-02 DIAGNOSIS — D50.9 IRON DEFICIENCY ANEMIA, UNSPECIFIED IRON DEFICIENCY ANEMIA TYPE: Primary | ICD-10-CM

## 2020-06-02 DIAGNOSIS — K29.60 EROSIVE GASTRITIS: Primary | ICD-10-CM

## 2020-06-02 DIAGNOSIS — Z51.81 ENCOUNTER FOR MONITORING LONG-TERM PROTON PUMP INHIBITOR THERAPY: ICD-10-CM

## 2020-06-02 PROCEDURE — 43239 PR EGD, FLEX, W/BIOPSY, SGL/MULTI: ICD-10-PCS | Mod: 51,HCNC,, | Performed by: INTERNAL MEDICINE

## 2020-06-02 PROCEDURE — 94761 N-INVAS EAR/PLS OXIMETRY MLT: CPT | Mod: HCNC

## 2020-06-02 PROCEDURE — 88342 IMHCHEM/IMCYTCHM 1ST ANTB: CPT | Mod: 26,HCNC,, | Performed by: PATHOLOGY

## 2020-06-02 PROCEDURE — 37000009 HC ANESTHESIA EA ADD 15 MINS: Mod: HCNC | Performed by: INTERNAL MEDICINE

## 2020-06-02 PROCEDURE — 88305 TISSUE EXAM BY PATHOLOGIST: ICD-10-PCS | Mod: 26,HCNC,, | Performed by: PATHOLOGY

## 2020-06-02 PROCEDURE — 88342 IMHCHEM/IMCYTCHM 1ST ANTB: CPT | Mod: HCNC | Performed by: PATHOLOGY

## 2020-06-02 PROCEDURE — D9220A PRA ANESTHESIA: Mod: HCNC,,, | Performed by: ANESTHESIOLOGY

## 2020-06-02 PROCEDURE — 27201012 HC FORCEPS, HOT/COLD, DISP: Mod: HCNC | Performed by: INTERNAL MEDICINE

## 2020-06-02 PROCEDURE — 88342 CHG IMMUNOCYTOCHEMISTRY: ICD-10-PCS | Mod: 26,HCNC,, | Performed by: PATHOLOGY

## 2020-06-02 PROCEDURE — 43239 EGD BIOPSY SINGLE/MULTIPLE: CPT | Mod: 51,HCNC,, | Performed by: INTERNAL MEDICINE

## 2020-06-02 PROCEDURE — 88305 TISSUE EXAM BY PATHOLOGIST: CPT | Mod: HCNC | Performed by: PATHOLOGY

## 2020-06-02 PROCEDURE — 45380 PR COLONOSCOPY,BIOPSY: ICD-10-PCS | Mod: 52,HCNC,, | Performed by: INTERNAL MEDICINE

## 2020-06-02 PROCEDURE — 25000003 PHARM REV CODE 250: Mod: HCNC | Performed by: NURSE ANESTHETIST, CERTIFIED REGISTERED

## 2020-06-02 PROCEDURE — 25000003 PHARM REV CODE 250: Mod: HCNC | Performed by: INTERNAL MEDICINE

## 2020-06-02 PROCEDURE — D9220A PRA ANESTHESIA: ICD-10-PCS | Mod: HCNC,,, | Performed by: NURSE ANESTHETIST, CERTIFIED REGISTERED

## 2020-06-02 PROCEDURE — 88305 TISSUE EXAM BY PATHOLOGIST: CPT | Mod: 26,HCNC,, | Performed by: PATHOLOGY

## 2020-06-02 PROCEDURE — 43239 EGD BIOPSY SINGLE/MULTIPLE: CPT | Mod: HCNC | Performed by: INTERNAL MEDICINE

## 2020-06-02 PROCEDURE — 45380 COLONOSCOPY AND BIOPSY: CPT | Mod: 52,HCNC,, | Performed by: INTERNAL MEDICINE

## 2020-06-02 PROCEDURE — 37000008 HC ANESTHESIA 1ST 15 MINUTES: Mod: HCNC | Performed by: INTERNAL MEDICINE

## 2020-06-02 PROCEDURE — 45380 COLONOSCOPY AND BIOPSY: CPT | Mod: HCNC | Performed by: INTERNAL MEDICINE

## 2020-06-02 PROCEDURE — D9220A PRA ANESTHESIA: Mod: HCNC,,, | Performed by: NURSE ANESTHETIST, CERTIFIED REGISTERED

## 2020-06-02 PROCEDURE — 63600175 PHARM REV CODE 636 W HCPCS: Mod: HCNC | Performed by: NURSE ANESTHETIST, CERTIFIED REGISTERED

## 2020-06-02 PROCEDURE — D9220A PRA ANESTHESIA: ICD-10-PCS | Mod: HCNC,,, | Performed by: ANESTHESIOLOGY

## 2020-06-02 RX ORDER — PROPOFOL 10 MG/ML
VIAL (ML) INTRAVENOUS CONTINUOUS PRN
Status: DISCONTINUED | OUTPATIENT
Start: 2020-06-02 | End: 2020-06-02

## 2020-06-02 RX ORDER — LIDOCAINE HYDROCHLORIDE 20 MG/ML
INJECTION INTRAVENOUS
Status: DISCONTINUED | OUTPATIENT
Start: 2020-06-02 | End: 2020-06-02

## 2020-06-02 RX ORDER — PANTOPRAZOLE SODIUM 40 MG/1
40 TABLET, DELAYED RELEASE ORAL
Qty: 90 TABLET | Refills: 3 | Status: ON HOLD | OUTPATIENT
Start: 2020-06-02 | End: 2020-01-01 | Stop reason: HOSPADM

## 2020-06-02 RX ORDER — SODIUM CHLORIDE 9 MG/ML
INJECTION, SOLUTION INTRAVENOUS CONTINUOUS
Status: DISCONTINUED | OUTPATIENT
Start: 2020-06-02 | End: 2020-06-02 | Stop reason: HOSPADM

## 2020-06-02 RX ORDER — SODIUM CHLORIDE 0.9 % (FLUSH) 0.9 %
10 SYRINGE (ML) INJECTION
Status: DISCONTINUED | OUTPATIENT
Start: 2020-06-02 | End: 2020-06-02 | Stop reason: HOSPADM

## 2020-06-02 RX ORDER — PROPOFOL 10 MG/ML
VIAL (ML) INTRAVENOUS
Status: DISCONTINUED | OUTPATIENT
Start: 2020-06-02 | End: 2020-06-02

## 2020-06-02 RX ORDER — GLYCOPYRROLATE 0.2 MG/ML
INJECTION INTRAMUSCULAR; INTRAVENOUS
Status: DISCONTINUED | OUTPATIENT
Start: 2020-06-02 | End: 2020-06-02

## 2020-06-02 RX ADMIN — SODIUM CHLORIDE: 0.9 INJECTION, SOLUTION INTRAVENOUS at 11:06

## 2020-06-02 RX ADMIN — PROPOFOL 30 MG: 10 INJECTION, EMULSION INTRAVENOUS at 12:06

## 2020-06-02 RX ADMIN — PROPOFOL 20 MG: 10 INJECTION, EMULSION INTRAVENOUS at 12:06

## 2020-06-02 RX ADMIN — PROPOFOL 60 MG: 10 INJECTION, EMULSION INTRAVENOUS at 11:06

## 2020-06-02 RX ADMIN — GLYCOPYRROLATE 0.2 MG: 0.2 INJECTION, SOLUTION INTRAMUSCULAR; INTRAVENOUS at 11:06

## 2020-06-02 RX ADMIN — PROPOFOL 30 MG: 10 INJECTION, EMULSION INTRAVENOUS at 11:06

## 2020-06-02 RX ADMIN — LIDOCAINE HYDROCHLORIDE 60 MG: 20 INJECTION, SOLUTION INTRAVENOUS at 11:06

## 2020-06-02 RX ADMIN — PROPOFOL 10 MG: 10 INJECTION, EMULSION INTRAVENOUS at 11:06

## 2020-06-02 RX ADMIN — PROPOFOL 150 MCG/KG/MIN: 10 INJECTION, EMULSION INTRAVENOUS at 11:06

## 2020-06-02 NOTE — TELEPHONE ENCOUNTER
Left message at both numbers to return call regarding making an appointment with Colon and Rectal Surgery per referral from Dr Medina. Will Basket Message patient as well.

## 2020-06-02 NOTE — TELEPHONE ENCOUNTER
----- Message from Jermain Cazares sent at 6/2/2020  8:37 AM CDT -----  Contact: pt: [phone]  Ronald milner is calling to speak with you       Please contact pt: [phone]

## 2020-06-02 NOTE — H&P
[facility]  History & Physical    Subjective:      Chief Complaint/Reason for Admission:    EGD and colonoscopy    Naty St is a 74 y.o. female.    Past Medical History:   Diagnosis Date    Anticoagulant long-term use     Blood clot in vein 06/2016    Breast cancer 1994    Cataract     Hypertension     Lung cancer     Lung cancer metastatic to brain     Pancreatic cancer 1998    Posterior capsular opacification, left eye     Psychiatric problem     Seizures 01/25/2016    Stroke     Therapy      Past Surgical History:   Procedure Laterality Date    BONE BIOSPY  3/9/16    BRAIN SURGERY  1/12/16    tumor removal 1/12/16    BREAST LUMPECTOMY  1998    left    CATARACT EXTRACTION Bilateral 2004    yag OU    CHOLECYSTECTOMY  1998    COLONOSCOPY N/A 11/13/2015    Procedure: COLONOSCOPY;  Surgeon: Alex Carmona MD;  Location: [facility] ([address] [place] [address]);  Service: Endoscopy;  Laterality: N/A;  2 year f/u    COLONOSCOPY N/A 11/7/2018    Procedure: COLONOSCOPY;  Surgeon: Jim Raman MD;  Location: [facility] ([address] [place] [address]);  Service: Endoscopy;  Laterality: N/A;  Eliquis - per Dr. Vazquez -ok to hold Eliquis x 2 days prior to colon- ERW    cysto and right ureteral stent  4/27/12    ecoli  2010    removal of blockage, done throat, then through the liver.    HYSTERECTOMY  1974    KIDNEY STONE SURGERY  2010--laser    left eswl  6/20/12    OOPHORECTOMY  4/25/2012    Laparoscopic BSO, lysis of adhesions, cystoscopy greater than 35mins     WHIPPLE PROCEDURE W/ LAPAROSCOPY  1998     Family History   Problem Relation Age of Onset    Breast cancer Mother     Lung cancer Mother     Leukemia Paternal Grandfather     Stomach cancer Paternal Grandmother     Colon cancer Neg Hx     Ovarian cancer Neg Hx      Social History     Tobacco Use    Smoking status: Former Smoker     Packs/day: 0.00     Years: 0.00     Pack years: 0.00     Last attempt to quit: 9/26/1961     Years since  quittin.7    Smokeless tobacco: Never Used   Substance Use Topics    Alcohol use: No    Drug use: No       PTA Medications   Medication Sig    albuterol-ipratropium (DUO-NEB) 2.5 mg-0.5 mg/3 mL nebulizer solution Take 3 mLs by nebulization every 6 (six) hours as needed for Wheezing or Shortness of Breath. Rescue    amitriptyline (ELAVIL) 50 MG tablet Take 1 tablet (50 mg total) by mouth every evening.    calcium carbonate (OS-UNIQUE) 500 mg calcium (1,250 mg) tablet Take 2 tablets (1,000 mg total) by mouth once daily.    clindamycin phosphate 1% (CLINDAGEL) 1 % gel APPLY TOPICALLY TWICE DAILY; apply to back area    CREON CpDR TAKE ONE CAPSULE BY MOUTH THREE TIMES A DAY WITH MEALS    denosumab (XGEVA) 120 mg/1.7 mL (70 mg/mL) Soln Inject 120 mg into the skin every 28 days.    erlotinib (TARCEVA) 150 MG tablet Take 1 tablet (150 mg total) by mouth once daily.    furosemide (LASIX) 40 MG tablet Take 1 tablet (40 mg total) by mouth daily as needed (swelling).    ketoconazole (NIZORAL) 2 % cream Apply topically once daily.    levETIRAcetam (KEPPRA) 500 MG Tab TAKE ONE TABLET BY MOUTH TWICE DAILY    lipase-protease-amylase 12,000-38,000-60,000 units (CREON) CpDR Take 1 capsule by mouth 3 (three) times daily with meals.    LORazepam (ATIVAN) 1 MG tablet TAKE ONE TABLET BY MOUTH TWICE DAILY    losartan (COZAAR) 50 MG tablet TAKE ONE TABLET BY MOUTH TWICE DAILY    metoprolol succinate (TOPROL-XL) 50 MG 24 hr tablet TAKE ONE TABLET BY MOUTH ONCE DAILY    multivitamin (THERAGRAN) per tablet Take 1 tablet by mouth once daily.    MYRBETRIQ 50 mg Tb24 TAKE ONE TABLET BY MOUTH ONCE DAILY    senna-docusate 8.6-50 mg (SENNA LAXATIVE-STOOL SOFTENER) 8.6-50 mg per tablet Take 1 tablet by mouth once daily.    ELIQUIS 5 mg Tab TAKE ONE TABLET BY MOUTH TWICE DAILY    polyethylene glycol (GLYCOLAX) 17 gram/dose powder     [] polyethylene glycol (GOLYTELY,NULYTELY) 236-22.74-6.74 -5.86 gram suspension Take  4,000 mLs (4 L total) by mouth once. for 1 dose    [DISCONTINUED] atorvastatin (LIPITOR) 40 MG tablet     [DISCONTINUED] dronabinol (MARINOL) 5 MG capsule Take 1 capsule (5 mg total) by mouth 2 (two) times daily before meals. (Patient not taking: Reported on 5/18/2020)     Review of patient's allergies indicates:   Allergen Reactions    Tobradex [tobramycin-dexamethasone] Swelling    Iodinated contrast media Hives    Latex Rash and Hives    Phenytoin sodium extended Other (See Comments) and Rash        Review of Systems   Constitutional: Negative for chills, fever and weight loss.   Respiratory: Negative for shortness of breath.    Cardiovascular: Negative for chest pain.   Gastrointestinal: Negative for abdominal pain.       Objective:      Vital Signs (Most Recent)  Temp: 97.7 °F (36.5 °C) (06/02/20 1058)  Pulse: 86 (06/02/20 1058)  Resp: 18 (06/02/20 1058)  BP: (!) 167/76 (06/02/20 1058)  SpO2: 99 % (06/02/20 1058)    Vital Signs Range (Last 24H):  Temp:  [97.7 °F (36.5 °C)]   Pulse:  [86]   Resp:  [18]   BP: (167)/(76)   SpO2:  [99 %]     Physical Exam   Constitutional: She is oriented to person, place, and time. She appears well-developed and well-nourished.   Cardiovascular: Normal rate.   Pulmonary/Chest: Effort normal.   Abdominal: Soft.   Neurological: She is alert and oriented to person, place, and time.   Skin: Skin is warm and dry.   Psychiatric: She has a normal mood and affect. Her behavior is normal. Judgment and thought content normal.           Assessment:      Active Hospital Problems    Diagnosis  POA    JANICE (iron deficiency anemia) [D50.9]  Yes      Resolved Hospital Problems   No resolved problems to display.       Plan:    Direct access EGD and Colonoscopy from Dr. Carmona for iron deficiency anemia.

## 2020-06-02 NOTE — ANESTHESIA PREPROCEDURE EVALUATION
06/02/2020  Naty St is a 74 y.o., female for EGD/colonscopy.    Anesthesia Evaluation    I have reviewed the Patient Summary Reports.    I have reviewed the Nursing Notes.    I have reviewed the Medications.     Review of Systems  Anesthesia Hx:  No problems with previous Anesthesia  Denies Family Hx of Anesthesia complications.    Hematology/Oncology:         -- Anemia:   Cardiovascular:   Exercise tolerance: good Hypertension    Pulmonary:  Pulmonary Normal    Renal/:   Chronic Renal Disease, CRI    Hepatic/GI:   Bowel Prep.    Neurological:   CVA Seizures    Psych:   Psychiatric History        Past Medical History:   Diagnosis Date    Anticoagulant long-term use     Blood clot in vein 06/2016    Breast cancer 1994    Cataract     Hypertension     Lung cancer     Lung cancer metastatic to brain     Pancreatic cancer 1998    Posterior capsular opacification, left eye     Psychiatric problem     Seizures 01/25/2016    Stroke     Therapy      Past Surgical History:   Procedure Laterality Date    BONE BIOSPY  3/9/16    BRAIN SURGERY  1/12/16    tumor removal 1/12/16    BREAST LUMPECTOMY  1998    left    CATARACT EXTRACTION Bilateral 2004    yag OU    CHOLECYSTECTOMY  1998    COLONOSCOPY N/A 11/13/2015    Procedure: COLONOSCOPY;  Surgeon: Alex Carmona MD;  Location: [facility] ([address] [place] [address]);  Service: Endoscopy;  Laterality: N/A;  2 year f/u    COLONOSCOPY N/A 11/7/2018    Procedure: COLONOSCOPY;  Surgeon: Jim Raman MD;  Location: [facility] ([address] [place] [address]);  Service: Endoscopy;  Laterality: N/A;  Eliquis - per Dr. Vazquez -ok to hold Eliquis x 2 days prior to colon- ERW    cysto and right ureteral stent  4/27/12    ecoli  2010    removal of blockage, done throat, then through the liver.    HYSTERECTOMY  1974    KIDNEY STONE SURGERY  2010--laser    left eswl  6/20/12     OOPHORECTOMY  4/25/2012    Laparoscopic BSO, lysis of adhesions, cystoscopy greater than 35mins     WHIPPLE PROCEDURE W/ LAPAROSCOPY  1998     Patient Active Problem List   Diagnosis    Pelvic peritoneal adhesions, female (postoperative) (postinfection)    Allergy to latex    Allergy to radiographic dye    Essential hypertension    History of breast cancer    Meningioma    Malignant neoplasm of lower lobe of right lung    Brain metastases    Secondary cancer of bone    Hypocalcemia    OAB (overactive bladder)    Adjustment disorder with depressed mood    Encounter for antineoplastic chemotherapy    Hypomagnesemia    CKD (chronic kidney disease) stage 3, GFR 30-59 ml/min    Vitamin D deficiency    Anemia of chronic renal failure    Vertigo    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    Right Cerebellar infarct    Ataxia    Right-sided cerebrovascular accident (CVA)    Folliculitis    Stomatitis    Anticoagulant long-term use    Adenocarcinoma of lung, right    Anemia due to chronic blood loss    JANICE (iron deficiency anemia)    Seizure disorder    History of stroke    Diastolic dysfunction    Anemia    Acute hypoxemic respiratory failure    Stage 3 chronic kidney disease    UTI (urinary tract infection)    Debility    Acute on chronic respiratory failure with hypoxia    Metabolic encephalopathy    Generalized weakness    Pulmonary infiltrates on CXR    Dilated cardiomyopathy    Dyslipidemia    Abdominal pain    SBO (small bowel obstruction)    Insomnia    Anxiety    History of deep vein thrombosis (DVT) of lower extremity    Elevated bilirubin    Anemia in neoplastic disease    Encounter for screening colonoscopy    Secondary malignant neoplasm of intrathoracic lymph nodes    Pre-syncope    Febrile illness, acute    Elevated LFTs    Hyperchloremic metabolic acidosis    Adenocarcinoma of lung    Palliative care encounter    Anemia          Physical Exam  General:  Well nourished    Airway/Jaw/Neck:  Airway Findings: Mouth Opening: Normal General Airway Assessment: Adult  Mallampati: II  TM Distance: Normal, at least 6 cm  Jaw/Neck Findings:  Neck ROM: Normal ROM  Neck Findings:     Eyes/Ears/Nose:  Eyes/Ears/Nose Findings:    Dental:  Dental Findings: In tact   Chest/Lungs:  Chest/Lungs Findings: Normal Respiratory Rate     Heart/Vascular:  Heart Findings: Rate: Normal        Mental Status:  Mental Status Findings:  Cooperative, Alert and Oriented       Wt Readings from Last 3 Encounters:   06/02/20 61.2 kg (135 lb)   05/11/20 61.1 kg (134 lb 11.2 oz)   03/05/20 61.1 kg (134 lb 11.2 oz)     Temp Readings from Last 3 Encounters:   06/02/20 36.5 °C (97.7 °F) (Oral)   05/12/20 36.5 °C (97.7 °F) (Temporal)   03/27/20 36.5 °C (97.7 °F) (Temporal)     BP Readings from Last 3 Encounters:   06/02/20 (!) 167/76   05/12/20 102/60   05/11/20 123/63     Pulse Readings from Last 3 Encounters:   06/02/20 86   05/12/20 61   05/11/20 65     Lab Results   Component Value Date    WBC 5.36 04/23/2020    HGB 7.8 (L) 04/23/2020    HCT 28.2 (L) 04/23/2020    MCV 80 (L) 04/23/2020     04/23/2020         Chemistry        Component Value Date/Time     05/29/2020 1053    K 4.2 05/29/2020 1053     (H) 05/29/2020 1053    CO2 17 (L) 05/29/2020 1053    BUN 56 (H) 05/29/2020 1053    CREATININE 2.3 (H) 05/29/2020 1053    GLU 89 05/29/2020 1053        Component Value Date/Time    CALCIUM 8.4 (L) 05/29/2020 1053    ALKPHOS 55 05/29/2020 1053    AST 23 05/29/2020 1053    ALT 15 05/29/2020 1053    BILITOT 0.8 05/29/2020 1053    ESTGFRAFRICA 23.3 (A) 05/29/2020 1053    EGFRNONAA 20.2 (A) 05/29/2020 1053        Results for orders placed or performed during the hospital encounter of 02/24/20   ECG 12 lead    Collection Time: 02/24/20 11:42 AM    Narrative    Test Reason : I63.9,    Vent. Rate : 092 BPM     Atrial Rate : 092 BPM     P-R Int : 138 ms          QRS  Dur : 080 ms      QT Int : 354 ms       P-R-T Axes : 059 -05 036 degrees     QTc Int : 437 ms    Normal sinus rhythm  Minimal voltage criteria for LVH, may be normal variant  Borderline Abnormal ECG  When compared with ECG of 21-FEB-2020 15:11,  Criteria for Septal infarct are no longer Present  Confirmed by Trip SALGADO MD ([id number]) on 2/24/2020 11:54:45 AM    Referred By: System System           Confirmed By:Trip SALGADO MD     NM Stress test 6/6/2018  Impression: NORMAL MYOCARDIAL PERFUSION  1. The perfusion scan is free of evidence for myocardial ischemia or injury.   2. Resting wall motion is physiologic.   3. Resting LV function is normal.  (normal is >= 51%)  4. The ventricular volumes are normal at rest and stress.   5. The extracardiac distribution of radioactivity is normal.   6. There was no previous study available to compare.    Anesthesia Plan  Type of Anesthesia, risks & benefits discussed:  Anesthesia Type:  general  Patient's Preference: ga  Intra-op Monitoring Plan: standard ASA monitors  Intra-op Monitoring Plan Comments:   Post Op Pain Control Plan: per primary service following discharge from PACU  Post Op Pain Control Plan Comments:   Induction:   IV  Beta Blocker:  Patient is not currently on a Beta-Blocker (No further documentation required).       Informed Consent: Patient understands risks and agrees with Anesthesia plan.  Questions answered. Anesthesia consent signed with patient.  ASA Score: 3     Day of Surgery Review of History & Physical:    H&P update referred to the provider.         Ready For Surgery From Anesthesia Perspective.

## 2020-06-02 NOTE — [employer] PATIENT INSTRUCTIONS
Discharge Summary/Instructions after an Endoscopic Procedure  Patient Name: Naty St  Patient MRN: [id number]  Patient YOB: 1945 Tuesday, June 2, 2020  Darius Medina MD  RESTRICTIONS:  During your procedure today, you received medications for sedation.  These   medications may affect your judgment, balance and coordination.  Therefore,   for 24 hours, you have the following restrictions:   - DO NOT drive a car, operate machinery, make legal/financial decisions,   sign important papers or drink alcohol.    ACTIVITY:  Today: no heavy lifting, straining or running due to procedural   sedation/anesthesia.  The following day: return to full activity including work.  DIET:  Eat and drink normally unless instructed otherwise.     TREATMENT FOR COMMON SIDE EFFECTS:  - Mild abdominal pain, nausea, belching, bloating or excessive gas:  rest,   eat lightly and use a heating pad.  - Sore Throat: treat with throat lozenges and/or gargle with warm salt   water.  - Because air was used during the procedure, expelling large amounts of air   from your rectum or belching is normal.  - If a bowel prep was taken, you may not have a bowel movement for 1-3 days.    This is normal.  SYMPTOMS TO WATCH FOR AND REPORT TO YOUR PHYSICIAN:  1. Abdominal pain or bloating, other than gas cramps.  2. Chest pain.  3. Back pain.  4. Signs of infection such as: chills or fever occurring within 24 hours   after the procedure.  5. Rectal bleeding, which would show as bright red, maroon, or black stools.   (A tablespoon of blood from the rectum is not serious, especially if   hemorrhoids are present.)  6. Vomiting.  7. Weakness or dizziness.  GO DIRECTLY TO THE NEAREST EMERGENCY ROOM IF YOU HAVE ANY OF THE FOLLOWING:      Difficulty breathing              Chills and/or fever over 101 F   Persistent vomiting and/or vomiting blood   Severe abdominal pain   Severe chest pain   Black, tarry stools   Bleeding- more than one  tablespoon   Any other symptom or condition that you feel may need urgent attention  Your doctor recommends these additional instructions:  If any biopsies were taken, your doctors clinic will contact you in 1 to 2   weeks with any results.  - Discharge patient to home.   - Await pathology results.   - Telephone endoscopist for pathology results in 2 weeks.   - Return to referring physician -Alex Carmona MD,   - Consider Use Protonix (pantoprazole) 40 mg by mouth once daily (or any   other full strength proton pump inhibitor) - best taken 45 minutes before   your first protein containing meal.   - Resume Eliquis (apixaban) at prior dose tomorrow.  For questions, problems or results please call your physician - Darius Medina MD at Work:  [phone].  [facility], EMERGENCY ROOM PHONE NUMBER: [phone]  IF A COMPLICATION OR EMERGENCY SITUATION ARISES AND YOU ARE UNABLE TO REACH   YOUR PHYSICIAN - GO DIRECTLY TO THE EMERGENCY ROOM.  Darius Medina MD  6/2/2020 1:04:27 PM  This report has been verified and signed electronically.  [employer]

## 2020-06-02 NOTE — [employer] PATIENT INSTRUCTIONS
Discharge Summary/Instructions after an Endoscopic Procedure  Patient Name: Naty St  Patient MRN: [id number]  Patient YOB: 1945 Tuesday, June 2, 2020  Darius Medina MD  RESTRICTIONS:  During your procedure today, you received medications for sedation.  These   medications may affect your judgment, balance and coordination.  Therefore,   for 24 hours, you have the following restrictions:   - DO NOT drive a car, operate machinery, make legal/financial decisions,   sign important papers or drink alcohol.    ACTIVITY:  Today: no heavy lifting, straining or running due to procedural   sedation/anesthesia.  The following day: return to full activity including work.  DIET:  Eat and drink normally unless instructed otherwise.     TREATMENT FOR COMMON SIDE EFFECTS:  - Mild abdominal pain, nausea, belching, bloating or excessive gas:  rest,   eat lightly and use a heating pad.  - Sore Throat: treat with throat lozenges and/or gargle with warm salt   water.  - Because air was used during the procedure, expelling large amounts of air   from your rectum or belching is normal.  - If a bowel prep was taken, you may not have a bowel movement for 1-3 days.    This is normal.  SYMPTOMS TO WATCH FOR AND REPORT TO YOUR PHYSICIAN:  1. Abdominal pain or bloating, other than gas cramps.  2. Chest pain.  3. Back pain.  4. Signs of infection such as: chills or fever occurring within 24 hours   after the procedure.  5. Rectal bleeding, which would show as bright red, maroon, or black stools.   (A tablespoon of blood from the rectum is not serious, especially if   hemorrhoids are present.)  6. Vomiting.  7. Weakness or dizziness.  GO DIRECTLY TO THE NEAREST EMERGENCY ROOM IF YOU HAVE ANY OF THE FOLLOWING:      Difficulty breathing              Chills and/or fever over 101 F   Persistent vomiting and/or vomiting blood   Severe abdominal pain   Severe chest pain   Black, tarry stools   Bleeding- more than one  tablespoon   Any other symptom or condition that you feel may need urgent attention  Your doctor recommends these additional instructions:  If any biopsies were taken, your doctors clinic will contact you in 1 to 2   weeks with any results.  - Discharge patient to home.   - Await pathology results.   - Telephone referring physician for study results in 1 week.   - Use original regular Metamucil one tablespoon in 12 ounces of water before   breakfast and before dinner (twice a day).   - Repeat colonoscopy DEVICE ASSISTED WITH Alex Carmona MD at the next   available appointment because the bowel preparation was poor. Needs much   better bowel prep.  - Resume Eliquis (apixaban) at prior dose tomorrow.   - Return to referring physician - Alex Carmona MD.   - Telephone referring physician for study results tomorrow.   - The findings and recommendations were discussed with the patient.   - Refer to a colo-rectal surgeon. for follow up rectal ulcer healing.  For questions, problems or results please call your physician - Darius Medina MD at Work:  [phone].  [facility], EMERGENCY ROOM PHONE NUMBER: [phone]  IF A COMPLICATION OR EMERGENCY SITUATION ARISES AND YOU ARE UNABLE TO REACH   YOUR PHYSICIAN - GO DIRECTLY TO THE EMERGENCY ROOM.  Darius Medina MD  6/2/2020 1:12:56 PM  This report has been verified and signed electronically.  [employer]

## 2020-06-02 NOTE — TRANSFER OF CARE
"Anesthesia Transfer of Care Note    Patient: Naty St    Procedure(s) Performed: Procedure(s) (LRB):  EGD (ESOPHAGOGASTRODUODENOSCOPY) (N/A)  COLONOSCOPY (N/A)    Patient location: PACU    Anesthesia Type: general    Transport from OR: Transported from OR on 6-10 L/min O2 by face mask with adequate spontaneous ventilation    Post pain: adequate analgesia    Post assessment: no apparent anesthetic complications and tolerated procedure well    Post vital signs: stable    Level of consciousness: sedated    Nausea/Vomiting: no nausea/vomiting    Complications: none    Transfer of care protocol was followed      Last vitals:   Visit Vitals  BP (!) 167/76 (BP Location: Left arm, Patient Position: Lying)   Pulse 86   Temp 36.5 °C (97.7 °F) (Oral)   Resp 18   Ht 5' 5" (1.651 m)   Wt 61.2 kg (135 lb)   LMP  (LMP Unknown)   SpO2 99%   Breastfeeding? No   BMI 22.47 kg/m²     "

## 2020-06-02 NOTE — DISCHARGE INSTRUCTIONS
Upper GI Endoscopy     During endoscopy, a long, flexible tube is used to view the inside of your upper GI tract.      Upper GI endoscopy allows your healthcare provider to look directly into the beginning of your gastrointestinal (GI) tract. The esophagus, stomach, and duodenum (the first part of the small intestine) make up the upper GI tract.   Before the exam  Follow these and any other instructions you are given before your endoscopy. If you dont follow the healthcare providers instructions carefully, the test may need to be canceled or done over:  · Don't eat or drink anything after midnight the night before your exam. If your exam is in the afternoon, drink only clear liquids in the morning. Don't eat or drink anything for 8 hours before the exam. In some cases, you may be able to take medicines with sips of water until 2 hours before the procedure. Speak with your healthcare provider about this.   · Bring your X-rays and any other test results you have.  · Because you will be sedated, arrange for an adult to drive you home after the exam.  · Tell your healthcare provider before the exam if you are taking any medicines or have any medical problems.  The procedure  Here is what to expect:  · You will lie on the endoscopy table. Usually patients lie on the left side.  · You will be monitored and given oxygen.  · Your throat may be numbed with a spray or gargle. You are given medicine through an intravenous (IV) line that will help you relax and remain comfortable. You may be awake or asleep during the procedure.  · The healthcare provider will put the endoscope in your mouth and down your esophagus. It is thinner than most pieces of food that you swallow. It will not affect your breathing. The medicine helps keep you from gagging.  · Air is put into your GI tract to expand it. It can make you burp.  · During the procedure, the healthcare provider can take biopsies (tissue samples), remove abnormalities,  such as polyps, or treat abnormalities through a variety of devices placed through the endoscope. You will not feel this.   · The endoscope carries images of your upper GI tract to a video screen. If you are awake, you may be able to look at the images.  · After the procedure is done, you will rest for a time. An adult must drive you home.  When to call your healthcare provider  Contact your healthcare provider if you have:  · Black or tarry stools, or blood in your stool  · Fever  · Pain in your belly that does not go away  · Nausea and vomiting, or vomiting blood   Date Last Reviewed: 7/1/2016 © 2000-2017 [employer]. [address] [place] [address], [place] [address]. All rights reserved. This information is not intended as a substitute for professional medical care. Always follow your healthcare professional's instructions.      Colonoscopy     A camera attached to a flexible tube with a viewing lens is used to take video pictures.     Colonoscopy is a test to view the inside of your lower digestive tract (colon and rectum). Sometimes it can show the last part of the small intestine (ileum). During the test, small pieces of tissue may be removed for testing. This is called a biopsy. Small growths, such as polyps, may also be removed.   Why is colonoscopy done?  The test is done to help look for colon cancer. And it can help find the source of abdominal pain, bleeding, and changes in bowel habits. It may be needed once a year, depending on factors such as your:  · Age  · Health history  · Family health history  · Symptoms  · Results from any prior colonoscopy  Risks and possible complications  These include:  · Bleeding               · A puncture or tear in the colon   · Risks of anesthesia  · A cancer lesion not being seen  Getting ready   To prepare for the test:  · Talk with your healthcare provider about the risks of the test (see below). Also ask your healthcare provider about alternatives to the  test.  · Tell your healthcare provider about any medicines you take. Also tell him or her about any health conditions you may have.  · Make sure your rectum and colon are empty for the test. Follow the diet and bowel prep instructions exactly. If you dont, the test may need to be rescheduled.  · Plan for a friend or family member to drive you home after the test.     Colonoscopy provides an inside view of the entire colon.     You may discuss the results with your doctor right away or at a future visit.  During the test   The test is usually done in the hospital on an outpatient basis. This means you go home the same day. The procedure takes about 30 minutes. During that time:  · You are given relaxing (sedating) medicine through an IV line. You may be drowsy, or fully asleep.  · The healthcare provider will first give you a physical exam to check for anal and rectal problems.  · Then the anus is lubricated and the scope inserted.  · If you are awake, you may have a feeling similar to needing to have a bowel movement. You may also feel pressure as air is pumped into the colon. Its OK to pass gas during the procedure.  · Biopsy, polyp removal, or other treatments may be done during the test.  After the test   You may have gas right after the test. It can help to try to pass it to help prevent later bloating. Your healthcare provider may discuss the results with you right away. Or you may need to schedule a follow-up visit to talk about the results. After the test, you can go back to your normal eating and other activities. You may be tired from the sedation and need to rest for a few hours.  Date Last Reviewed: 11/1/2016 © 2000-2017 [employer]. [address] [place] [address]. All rights reserved. This information is not intended as a substitute for professional medical care. Always follow your healthcare professional's instructions.          Anesthesia: General Anesthesia     You are  watched continuously during your procedure by your anesthesia provider.     Youre due to have surgery. During surgery, youll be given medicine called anesthesia or anesthetic. This will keep you comfortable and pain-free. Your anesthesia provider will use general anesthesia.  What is general anesthesia?  General anesthesia puts you into a state like deep sleep. It goes into the bloodstream (IV anesthetics), into the lungs (gas anesthetics), or both. You feel nothing during the procedure. You will not remember it. During the procedure, the anesthesia provider monitors you continuously. He or she checks your heart rate and rhythm, blood pressure, breathing, and blood oxygen.  · IV anesthetics. IV anesthetics are given through an IV line in your arm. Theyre often given first. This is so you are asleep before a gas anesthetic is started. Some kinds of IV anesthetics relieve pain. Others relax you. Your doctor will decide which kind is best in your case.  · Gas anesthetics. Gas anesthetics are breathed into the lungs. They are often used to keep you asleep. They can be given through a facemask or a tube placed in your larynx or trachea (breathing tube).  ¨ If you have a facemask, your anesthesia provider will most likely place it over your nose and mouth while youre still awake. Youll breathe oxygen through the mask as your IV anesthetic is started. Gas anesthetic may be added through the mask.  ¨ If you have a tube in the larynx or trachea, it will be inserted into your throat after youre asleep.  Anesthesia tools and medicines  You will likely have:  · IV anesthetics. These are put into an IV line into your bloodstream.  · Gas anesthetics. You breathe these anesthetics into your lungs, where they pass into your bloodstream.  · Pulse oximeter. This is a small clip that is attached to the end of your finger. This measures your blood oxygen level.  · Electrocardiography leads (electrodes). These are small sticky  pads that are placed on your chest. They record your heart rate and rhythm.  · Blood pressure cuff. This reads your blood pressure.  Risks and possible complications  General anesthesia has some risks. These include:  · Breathing problems  · Nausea and vomiting  · Sore throat or hoarseness (usually temporary)  · Allergic reaction to the anesthetic  · Irregular heartbeat (rare)  · Cardiac arrest (rare)   Anesthesia safety  · Follow all instructions you are given for how long not to eat or drink before your procedure.  · Be sure your doctor knows what medicines and drugs you take. This includes over-the-counter medicines, herbs, supplements, alcohol or other drugs. You will be asked when those were last taken.  · Have an adult family member or friend drive you home after the procedure.  · For the first 24 hours after your surgery:  ¨ Do not drive or use heavy equipment.  ¨ Do not make important decisions or sign legal documents. If important decisions or signing legal documents is necessary during the first 24 hours after surgery, have a trusted family member or spouse act on your behalf.  ¨ Avoid alcohol.  ¨ Have a responsible adult stay with you. He or she can watch for problems and help keep you safe.  Date Last Reviewed: 12/1/2016  © 0030-0957 [employer]. [address] [place] [address], [place] [address]. All rights reserved. This information is not intended as a substitute for professional medical care. Always follow your healthcare professional's instructions.

## 2020-06-03 ENCOUNTER — TELEPHONE (OUTPATIENT)
Dept: SURGERY | Facility: CLINIC | Age: 75
End: 2020-06-03

## 2020-06-03 NOTE — ANESTHESIA POSTPROCEDURE EVALUATION
Anesthesia Post Evaluation    Patient: Naty St    Procedure(s) Performed: Procedure(s) (LRB):  EGD (ESOPHAGOGASTRODUODENOSCOPY) (N/A)  COLONOSCOPY (N/A)    Final Anesthesia Type: general    Patient location during evaluation: PACU  Patient participation: Yes- Able to Participate  Level of consciousness: awake and alert  Post-procedure vital signs: reviewed and stable  Pain management: adequate  Airway patency: patent    PONV status at discharge: No PONV  Anesthetic complications: no      Cardiovascular status: blood pressure returned to baseline  Respiratory status: unassisted  Hydration status: euvolemic  Follow-up not needed.          Vitals Value Taken Time   /65 6/2/2020  2:29 PM   Temp 36.5 °C (97.7 °F) 6/2/2020  2:29 PM   Pulse 80 6/2/2020  2:29 PM   Resp 20 6/2/2020  2:29 PM   SpO2 96 % 6/2/2020  2:29 PM         No case tracking events are documented in the log.      Pain/Melissa Score: Melissa Score: 10 (6/2/2020  1:35 PM)

## 2020-06-03 NOTE — TELEPHONE ENCOUNTER
----- Message from Deysi Ferrari sent at 6/3/2020  8:59 AM CDT -----  Contact: Basil Lopez (Son)[phone]  Returning call from Cat...offered to schedule appt from referral but he dcl

## 2020-06-08 ENCOUNTER — TELEPHONE (OUTPATIENT)
Dept: HEMATOLOGY/ONCOLOGY | Facility: CLINIC | Age: 75
End: 2020-06-08

## 2020-06-08 NOTE — TELEPHONE ENCOUNTER
----- Message from Alina Sigala sent at 6/8/2020  3:33 PM CDT -----  Contact: Basil (son)  Returning a call     Caller name:: Basil    Who Left Message for Patient:: Zandra    Communication preference:: [phone]    Additional info::

## 2020-06-08 NOTE — TELEPHONE ENCOUNTER
"----- Message from Surya Linden sent at 6/8/2020  3:49 PM CDT -----  Contact: Basil  Returning a Missed Call    Contact Preference: [phone]  Patient returning phone call to: Zandra Matt RN      Provider: Dr. Vazquez      Additional Notes:  "Thank you for all that you do for our patients'"  "

## 2020-06-08 NOTE — TELEPHONE ENCOUNTER
Spoke with patient's son. He is calling to ask if his mom is OK to get xgeva injection, based on 5/29th's lab work.   Nurse informed son she would contact him back after speaking with dr sullivan. He voiced understanding.      Message routed to dr sullivan (corrected calcium is 9.04)

## 2020-06-09 LAB
FINAL PATHOLOGIC DIAGNOSIS: NORMAL
GROSS: NORMAL
MICROSCOPIC EXAM: NORMAL

## 2020-06-09 NOTE — TELEPHONE ENCOUNTER
Spoke with son. She is due for xgeva now. He will call back to schedule it for his mom, after he speaks with her.

## 2020-06-14 ENCOUNTER — NURSE TRIAGE (OUTPATIENT)
Dept: ADMINISTRATIVE | Facility: CLINIC | Age: 75
End: 2020-06-14

## 2020-06-14 NOTE — TELEPHONE ENCOUNTER
[facility]  Patient denies any covid 19 system since procedure  Instructed patient if symptoms develop to contact provider or [facility] on call  Patient agreed and voiced understanding

## 2020-06-17 ENCOUNTER — TELEPHONE (OUTPATIENT)
Dept: HEMATOLOGY/ONCOLOGY | Facility: CLINIC | Age: 75
End: 2020-06-17

## 2020-06-17 NOTE — TELEPHONE ENCOUNTER
Spoke with patient's son. Scheduled her for xgeva tomorrow.  And labs and xgeva one month from then.  He thanked nurse.

## 2020-06-17 NOTE — TELEPHONE ENCOUNTER
----- Message from Alina Sigala sent at 6/17/2020 10:41 AM CDT -----  Contact: Basil (son)  Scheduling request    Scheduling appt :: injection    Treating provider:: George    Do you feel you need to be seen today:: No    Contact:: [phone]    Additional info::

## 2020-06-17 NOTE — TELEPHONE ENCOUNTER
----- Message from Alina Sigala sent at 6/17/2020 10:42 AM CDT -----  Contact: Basil (son)  Results    Name of Test (Lab/Mammo/Etc): Lab    Date of Test: 05/29    Ordering Provider:George    Where the test was performed:    Best Call Back Number: [phone]    Additional Information:

## 2020-06-18 ENCOUNTER — TELEPHONE (OUTPATIENT)
Dept: HEMATOLOGY/ONCOLOGY | Facility: CLINIC | Age: 75
End: 2020-06-18

## 2020-06-18 NOTE — TELEPHONE ENCOUNTER
"----- Message from Dorinda Giles sent at 6/18/2020  8:49 AM CDT -----  Regarding: Reschedule Appt  Contact: Naty  Patient Assist    Name of caller: Naty   Provider name: George VALENTINO MD   Contact Preference:  [phone]  Is this regarding current patient or new patient?: current   What is the nature of the call?  - will not be able to make it to the appt today will need to r/s and come tomorrow instead.     Additional Notes:   "Thank you for all that you do for our patients'"          "

## 2020-06-19 ENCOUNTER — INFUSION (OUTPATIENT)
Dept: INFUSION THERAPY | Facility: HOSPITAL | Age: 75
End: 2020-06-19
Attending: INTERNAL MEDICINE
Payer: MEDICARE

## 2020-06-19 DIAGNOSIS — C34.31 MALIGNANT NEOPLASM OF LOWER LOBE OF RIGHT LUNG: Primary | ICD-10-CM

## 2020-06-19 PROCEDURE — 63600175 PHARM REV CODE 636 W HCPCS: Mod: JG,HCNC | Performed by: INTERNAL MEDICINE

## 2020-06-19 PROCEDURE — 96372 THER/PROPH/DIAG INJ SC/IM: CPT | Mod: HCNC

## 2020-06-19 RX ADMIN — DENOSUMAB 120 MG: 120 INJECTION SUBCUTANEOUS at 02:06

## 2020-06-19 NOTE — NURSING
Patient received Xgeva injection SQ to ABD.  Tolerated well.  Patient denies jaw pain, upcoming dental procedures.  Also reports taking daily Calcium and Vit. D.  Ambulated off unit with steady gait.

## 2020-06-22 ENCOUNTER — TELEPHONE (OUTPATIENT)
Dept: GASTROENTEROLOGY | Facility: CLINIC | Age: 75
End: 2020-06-22

## 2020-06-22 DIAGNOSIS — K62.89 PROCTITIS: Primary | ICD-10-CM

## 2020-06-22 RX ORDER — MESALAMINE 1000 MG/1
1000 SUPPOSITORY RECTAL NIGHTLY
Qty: 30 SUPPOSITORY | Refills: 2 | Status: ON HOLD | OUTPATIENT
Start: 2020-06-22 | End: 2020-01-01 | Stop reason: HOSPADM

## 2020-06-23 ENCOUNTER — TELEPHONE (OUTPATIENT)
Dept: HOME HEALTH SERVICES | Facility: CLINIC | Age: 75
End: 2020-06-23

## 2020-06-24 NOTE — TELEPHONE ENCOUNTER
6/23/2020 10:10am    Called patient to setup time for Palliative care f/u home visit today. Patient answered the phone and she notified me her son in [place] has recently passed away for CHF. States she is having a hard time with his death since he has children.     I told her I would reschedule a f/u appt in the next several weeks, and I asked her to call me if she needed anything for if she needed to talk.    Will send message to Mayi Bello to reschedule f/uappt.    Kiya Landin, PADMINI, FNP-C  [facility]

## 2020-07-01 ENCOUNTER — TELEPHONE (OUTPATIENT)
Dept: GASTROENTEROLOGY | Facility: CLINIC | Age: 75
End: 2020-07-01

## 2020-07-01 ENCOUNTER — TELEPHONE (OUTPATIENT)
Dept: HEMATOLOGY/ONCOLOGY | Facility: CLINIC | Age: 75
End: 2020-07-01

## 2020-07-01 NOTE — TELEPHONE ENCOUNTER
Patient returned call.  Please call him back. We can leave a detailed message.    Please schedule recall.

## 2020-07-01 NOTE — TELEPHONE ENCOUNTER
Reviewed c scope results and recommendations with patient.  Pt has appt with CRS on 7/6.  Advised of lab work on 8/6. Pt verbalized understanding to all and has no further questions at this time.    ----- Message from Melida Forrester MA sent at 7/1/2020  8:37 AM CDT -----  Hi,    Can you please contact pt and explain the results in case she has questions? I have scheduled her lab work for 08/06/2020 @8AM. Thanks  ----- Message -----  From: Nilda Ventura MA  Sent: 6/26/2020  12:02 PM CDT  To: Melida Forrester MA      ----- Message -----  From: Darius Medina MD  Sent: 6/22/2020   5:16 PM CDT  To: Adam Montez - please tell patient colonoscopy pathology shows nonspecific proctitis she needs to follow up with Dr. Carmona about this if she would like to start with a topical suppository once nightly prescription for Canasa suppository sent to her pharmacy.    Please order follow-up basic metabolic panel in 6 weeks orders placed

## 2020-07-01 NOTE — TELEPHONE ENCOUNTER
----- Message from Osman Alonso sent at 7/1/2020 12:22 PM CDT -----  Contact: Patient  Pt called and would like a nurse to call her back    This is regarding a PET Scan    Pt can be reached at [phone]

## 2020-07-02 ENCOUNTER — PATIENT OUTREACH (OUTPATIENT)
Dept: ADMINISTRATIVE | Facility: OTHER | Age: 75
End: 2020-07-02

## 2020-07-02 DIAGNOSIS — C34.31 MALIGNANT NEOPLASM OF LOWER LOBE OF RIGHT LUNG: Primary | ICD-10-CM

## 2020-07-03 ENCOUNTER — PATIENT MESSAGE (OUTPATIENT)
Dept: HEMATOLOGY/ONCOLOGY | Facility: CLINIC | Age: 75
End: 2020-07-03

## 2020-07-06 ENCOUNTER — OFFICE VISIT (OUTPATIENT)
Dept: SURGERY | Facility: CLINIC | Age: 75
End: 2020-07-06
Attending: COLON & RECTAL SURGERY
Payer: MEDICARE

## 2020-07-06 VITALS
WEIGHT: 138.25 LBS | HEART RATE: 59 BPM | DIASTOLIC BLOOD PRESSURE: 54 MMHG | SYSTOLIC BLOOD PRESSURE: 109 MMHG | BODY MASS INDEX: 23.03 KG/M2 | HEIGHT: 65 IN

## 2020-07-06 DIAGNOSIS — K62.89 PROCTITIS: Primary | ICD-10-CM

## 2020-07-06 PROCEDURE — 3078F PR MOST RECENT DIASTOLIC BLOOD PRESSURE < 80 MM HG: ICD-10-PCS | Mod: HCNC,CPTII,S$GLB, | Performed by: COLON & RECTAL SURGERY

## 2020-07-06 PROCEDURE — 99999 PR PBB SHADOW E&M-EST. PATIENT-LVL IV: ICD-10-PCS | Mod: PBBFAC,HCNC,, | Performed by: COLON & RECTAL SURGERY

## 2020-07-06 PROCEDURE — 1101F PT FALLS ASSESS-DOCD LE1/YR: CPT | Mod: HCNC,CPTII,S$GLB, | Performed by: COLON & RECTAL SURGERY

## 2020-07-06 PROCEDURE — 1126F PR PAIN SEVERITY QUANTIFIED, NO PAIN PRESENT: ICD-10-PCS | Mod: HCNC,S$GLB,, | Performed by: COLON & RECTAL SURGERY

## 2020-07-06 PROCEDURE — 1159F MED LIST DOCD IN RCRD: CPT | Mod: HCNC,S$GLB,, | Performed by: COLON & RECTAL SURGERY

## 2020-07-06 PROCEDURE — 99204 PR OFFICE/OUTPT VISIT, NEW, LEVL IV, 45-59 MIN: ICD-10-PCS | Mod: HCNC,S$GLB,, | Performed by: COLON & RECTAL SURGERY

## 2020-07-06 PROCEDURE — 1159F PR MEDICATION LIST DOCUMENTED IN MEDICAL RECORD: ICD-10-PCS | Mod: HCNC,S$GLB,, | Performed by: COLON & RECTAL SURGERY

## 2020-07-06 PROCEDURE — 1126F AMNT PAIN NOTED NONE PRSNT: CPT | Mod: HCNC,S$GLB,, | Performed by: COLON & RECTAL SURGERY

## 2020-07-06 PROCEDURE — 99204 OFFICE O/P NEW MOD 45 MIN: CPT | Mod: HCNC,S$GLB,, | Performed by: COLON & RECTAL SURGERY

## 2020-07-06 PROCEDURE — 99999 PR PBB SHADOW E&M-EST. PATIENT-LVL IV: CPT | Mod: PBBFAC,HCNC,, | Performed by: COLON & RECTAL SURGERY

## 2020-07-06 PROCEDURE — 3074F PR MOST RECENT SYSTOLIC BLOOD PRESSURE < 130 MM HG: ICD-10-PCS | Mod: HCNC,CPTII,S$GLB, | Performed by: COLON & RECTAL SURGERY

## 2020-07-06 PROCEDURE — 3074F SYST BP LT 130 MM HG: CPT | Mod: HCNC,CPTII,S$GLB, | Performed by: COLON & RECTAL SURGERY

## 2020-07-06 PROCEDURE — 3078F DIAST BP <80 MM HG: CPT | Mod: HCNC,CPTII,S$GLB, | Performed by: COLON & RECTAL SURGERY

## 2020-07-06 PROCEDURE — 1101F PR PT FALLS ASSESS DOC 0-1 FALLS W/OUT INJ PAST YR: ICD-10-PCS | Mod: HCNC,CPTII,S$GLB, | Performed by: COLON & RECTAL SURGERY

## 2020-07-06 NOTE — PROGRESS NOTES
CRS Office Visit History and Physical    Referring MD:   Darius Medina Md  [address] [place] [address]    SUBJECTIVE:     Chief Complaint: Proctitis    History of Present Illness:  The patient is new patient to this practice.   Course is as follows:  Patient is a 74 y.o. female presents for unclear reasons.    She underwent of colonoscopy for what sounds like anemia in early June, and was found to have proctitis.   The proctitis was biopsied, however the colonoscopy was incomplete.  She was referred to Dr. Carmona for balloon assisted colonoscopy, which has not been performed.   She has not had any follow-up with Gastroenterology since the colonoscopy.    She denies any rectal pain or bleeding.    However, in discussion she does think that she has some tissue that prolapses out from the rectum.  She says this does not happen with bowel movements, but can happen spontaneously.  This occurs 2-3 times per week.  Not associated with any bleeding or pain.  The tissue reduces spontaneously.  She denies any episodes of incarceration.    Of note, she has a history of multiple cancers including brain, breast, and long.  Currently on treatment with Dr. Vazquez,  although she is having some issues with insurance coverage.    Last Colonoscopy: 6/2/2020 (Incomplete exam b/c of poor prep)  The perianal and digital rectal examinations were normal.    A localized area of moderately erythematous and ulcerated mucosa was found in the distal rectum. Biopsies were taken with a cold forceps for histology. Estimated blood loss was minimal.   Stool in the entire colon, interfering with visualization. Stool kept clogging scope.     Pathology:  1.  Stomach (biopsy):   Oxyntic mucosa with mild reactive/regenerative changes   No active inflammation   No Helicobacter organisms (routine and immunostain)   2.  Rectum, erythema (biopsy):   Active proctitis with ulceration   No evidence of chronic injury   Multiple deeper levels  examined    Nov 2018 (Incomplete exam 2/2 poor prep)  Nov 2015 (Multiple polyps; rec'd 1 year interval)    Review of patient's allergies indicates:   Allergen Reactions    Tobradex [tobramycin-dexamethasone] Swelling    Iodinated contrast media Hives    Latex Rash and Hives    Phenytoin sodium extended Other (See Comments) and Rash       Past Medical History:   Diagnosis Date    Anticoagulant long-term use     Blood clot in vein 06/2016    Breast cancer 1994    Cataract     Hypertension     Lung cancer     Lung cancer metastatic to brain     Pancreatic cancer 1998    Posterior capsular opacification, left eye     Psychiatric problem     Seizures 01/25/2016    Stroke     Therapy      Past Surgical History:   Procedure Laterality Date    BONE BIOSPY  3/9/16    BRAIN SURGERY  1/12/16    tumor removal 1/12/16    BREAST LUMPECTOMY  1998    left    CATARACT EXTRACTION Bilateral 2004    yag OU    CHOLECYSTECTOMY  1998    COLONOSCOPY N/A 11/13/2015    Procedure: COLONOSCOPY;  Surgeon: Alex Carmona MD;  Location: [facility] (4TH FLR);  Service: Endoscopy;  Laterality: N/A;  2 year f/u    COLONOSCOPY N/A 11/7/2018    Procedure: COLONOSCOPY;  Surgeon: Jim Raman MD;  Location: [facility] (4TH FLR);  Service: Endoscopy;  Laterality: N/A;  Eliquis - per Dr. George santiago to hold Eliquis x 2 days prior to colon- ERW    COLONOSCOPY N/A 6/2/2020    Procedure: COLONOSCOPY;  Surgeon: Darius Medina MD;  Location: [facility] (2ND FLR);  Service: Endoscopy;  Laterality: N/A;  Schedule tomorrow  patient rescheduled due to poor bowel prep-BB  rapid covid test completed on 6/1/20 per ROQUE Zabala, email sent to Endo Staff-BB  updated instructions emailed to patient-BB  Latex allergy  approval to hold Eliquis received, see telephone encounter 5/18/20-BB      cysto and right ureteral stent  4/27/12    ecoli  2010    removal of blockage, done throat, then through the liver.    ESOPHAGOGASTRODUODENOSCOPY N/A  "2020    Procedure: EGD (ESOPHAGOGASTRODUODENOSCOPY);  Surgeon: Darius Medina MD;  Location: [facility] ([address] [place] [address]);  Service: Endoscopy;  Laterality: N/A;  Schedule tomorrow  patient rescheduled due to poor bowel prep-BB  rapid covid test completed on 20 per ORQUE Zabala, email sent to Endo Staff-BB  updated instructions emailed to patient-BB  Latex allergy  approval to hold Eliquis received, see telephone     HYSTERECTOMY  1974    KIDNEY STONE SURGERY  --laser    left eswl  12    OOPHORECTOMY  2012    Laparoscopic BSO, lysis of adhesions, cystoscopy greater than 35mins     WHIPPLE PROCEDURE W/ LAPAROSCOPY       Family History   Problem Relation Age of Onset    Breast cancer Mother     Lung cancer Mother     Leukemia Paternal Grandfather     Stomach cancer Paternal Grandmother     Colon cancer Neg Hx     Ovarian cancer Neg Hx      Social History     Tobacco Use    Smoking status: Former Smoker     Packs/day: 0.00     Years: 0.00     Pack years: 0.00     Quit date: 1961     Years since quittin.8    Smokeless tobacco: Never Used   Substance Use Topics    Alcohol use: No    Drug use: No        Review of Systems:  Review of Systems   All other systems reviewed and are negative.      OBJECTIVE:     Vital Signs (Most Recent)  BP (!) 109/54 (BP Location: Left arm, Patient Position: Sitting, BP Method: Large (Automatic))   Pulse (!) 59   Ht 5' 5" (1.651 m)   Wt 62.7 kg (138 lb 3.7 oz)   LMP  (LMP Unknown)   BMI 23.00 kg/m²     Physical Exam:  General: Black or  female in no distress   Neuro: Alert and oriented x 4.  Moves all extremities.     HEENT: No icterus.  Trachea midline  Respiratory: Respirations are even and unlabored  Cardiac: Regular rate  Abdomen:   Soft, nontender, nondistended  Extremities: Warm dry and intact     the patient was taken to the restroom in asked to sit on the commode.  She performed a Valsalva maneuver and attempted to pass some " stool.  She was unable to reproduce prolapse at this time.      ASSESSMENT/PLAN:       74-year-old female with proctitis on recent, incomplete, colonoscopy.  There is some question in her history of rectal prolapse, which could contribute to the pattern of inflammation seen on her endoscopy photos.  As she is unable to reproduce this today in clinic we discussed taking a photo at home and sending it securely through our portal versus evaluation with defecography.  Order for defecography was placed today, and she was given instructions on how to securely sent photos through the portal.   Will also send note to gastroenterology to reschedule her for colonoscopy with balloon assistance.  Plan to see her back in clinic following colonoscopy and MRI.    Aline Gifford MD  Staff Surgeon  Colon & Rectal Surgery

## 2020-07-06 NOTE — Clinical Note
Hey-  I saw this patient today.  I wasn't sure exactly why you guys sent her?  It looks like she needs a repeat scope with Mathew for incomplete exam?  She gave me a ?history of prolapse, but we couldn't see anything on exam in the office.  I am going to get a defecography to [facility] for that, but otherwise I think she can/should follow-up with you all?    Thanks,  Chacha

## 2020-07-06 NOTE — LETTER
July 6, 2020      Darius Medina MD  [address]  [facility] [address]           [facility]  [address]  [facility] [address]  Phone: [phone]          Patient: Naty St   MR Number: [id number]   YOB: 1945   Date of Visit: 7/6/2020       Dear Dr. Darius Medina:    Thank you for referring Naty St to me for evaluation. Attached you will find relevant portions of my assessment and plan of care.    If you have questions, please do not hesitate to call me. I look forward to following Naty St along with you.    Sincerely,    Aline Gifford MD    Enclosure  CC:  No Recipients    If you would like to receive this communication electronically, please contact [email or web address] or [phone] to request more information on [employer] access.    For providers and/or their staff who would like to refer a patient to [facility], please contact us through our one-stop-shop provider referral line, [facility], at [phone].    If you feel you have received this communication in error or would no longer like to receive these types of communications, please e-mail [email or web address]

## 2020-07-07 ENCOUNTER — TELEPHONE (OUTPATIENT)
Dept: ENDOSCOPY | Facility: HOSPITAL | Age: 75
End: 2020-07-07

## 2020-07-07 NOTE — TELEPHONE ENCOUNTER
I spoke with patient in regards to scheduling Balloon colonoscopy. She is not ready to schedule at this time. I provided my name and number for her to call back.

## 2020-07-09 ENCOUNTER — TELEPHONE (OUTPATIENT)
Dept: HEMATOLOGY/ONCOLOGY | Facility: CLINIC | Age: 75
End: 2020-07-09

## 2020-07-09 NOTE — TELEPHONE ENCOUNTER
----- Message from Farhana Neville sent at 7/9/2020  2:32 PM CDT -----  Regarding: Verify Drug Interaction  Contact: Ms Villa/   [facility] [phone]  Type    Who Called: Ms Villa/  calling to speak with nurse need to verify drug interaction with Pantopravole and Erlotinib  [facility] [phone]  Refill or New Rx:   RX Name and Strength:  How is the patient currently taking it? (ex. 1XDay):  Is this a 30 day or 90 day RX:  Preferred Pharmacy with phone number:[facility]   Local or Mail Order:  Ordering Provider:Dr Vazquez  Would the patient rather a call back or a response via [facility]? call  Best Call Back Number:[phone]  Additional Information:

## 2020-07-09 NOTE — TELEPHONE ENCOUNTER
Spoke with pharmD. protonix (prescribed by dr dietz) and tarceva has a drug/drug interaction. protonix decreases efficacy of tarceva by 46%.  This would be for all PPIs.   Pepcid, if taken, would need to be taken 10 hours before tarceva-----as an alternative.      Message routed to dr sullivan

## 2020-07-13 ENCOUNTER — HOSPITAL ENCOUNTER (OUTPATIENT)
Dept: RADIOLOGY | Facility: HOSPITAL | Age: 75
Discharge: HOME | End: 2020-07-13
Attending: INTERNAL MEDICINE
Payer: MEDICARE

## 2020-07-13 DIAGNOSIS — C34.31 MALIGNANT NEOPLASM OF LOWER LOBE OF RIGHT LUNG: ICD-10-CM

## 2020-07-13 LAB — POCT GLUCOSE: 80 MG/DL (ref 70–110)

## 2020-07-13 PROCEDURE — 78815 NM PET CT ROUTINE: ICD-10-PCS | Mod: 26,HCNC,PS, | Performed by: RADIOLOGY

## 2020-07-13 PROCEDURE — 78815 PET IMAGE W/CT SKULL-THIGH: CPT | Mod: TC,HCNC

## 2020-07-13 PROCEDURE — A9552 F18 FDG: HCPCS | Mod: HCNC

## 2020-07-13 PROCEDURE — 78815 PET IMAGE W/CT SKULL-THIGH: CPT | Mod: 26,HCNC,PS, | Performed by: RADIOLOGY

## 2020-07-14 ENCOUNTER — CARE AT HOME (OUTPATIENT)
Dept: HOME HEALTH SERVICES | Facility: CLINIC | Age: 75
End: 2020-07-14
Payer: MEDICARE

## 2020-07-14 DIAGNOSIS — Z51.5 PALLIATIVE CARE ENCOUNTER: Primary | ICD-10-CM

## 2020-07-14 DIAGNOSIS — R05.8 NON-PRODUCTIVE COUGH: ICD-10-CM

## 2020-07-14 DIAGNOSIS — R53.1 GENERALIZED WEAKNESS: ICD-10-CM

## 2020-07-14 DIAGNOSIS — Z71.89 COUNSELING REGARDING ADVANCE CARE PLANNING AND GOALS OF CARE: ICD-10-CM

## 2020-07-14 DIAGNOSIS — R49.0 HOARSE: ICD-10-CM

## 2020-07-14 DIAGNOSIS — R53.83 FATIGUE, UNSPECIFIED TYPE: ICD-10-CM

## 2020-07-14 DIAGNOSIS — F32.A DEPRESSION, UNSPECIFIED DEPRESSION TYPE: ICD-10-CM

## 2020-07-14 PROCEDURE — 1159F PR MEDICATION LIST DOCUMENTED IN MEDICAL RECORD: ICD-10-PCS | Mod: 95,,, | Performed by: NURSE PRACTITIONER

## 2020-07-14 PROCEDURE — 99497 ADVNCD CARE PLAN 30 MIN: CPT | Mod: 95,,, | Performed by: NURSE PRACTITIONER

## 2020-07-14 PROCEDURE — 99497 PR ADVNCD CARE PLAN 30 MIN: ICD-10-PCS | Mod: 95,,, | Performed by: NURSE PRACTITIONER

## 2020-07-14 PROCEDURE — 1101F PT FALLS ASSESS-DOCD LE1/YR: CPT | Mod: CPTII,95,, | Performed by: NURSE PRACTITIONER

## 2020-07-14 PROCEDURE — 1126F PR PAIN SEVERITY QUANTIFIED, NO PAIN PRESENT: ICD-10-PCS | Mod: 95,,, | Performed by: NURSE PRACTITIONER

## 2020-07-14 PROCEDURE — 99443 PR PHYSICIAN TELEPHONE EVALUATION 21-30 MIN: ICD-10-PCS | Mod: 95,,, | Performed by: NURSE PRACTITIONER

## 2020-07-14 PROCEDURE — 1101F PR PT FALLS ASSESS DOC 0-1 FALLS W/OUT INJ PAST YR: ICD-10-PCS | Mod: CPTII,95,, | Performed by: NURSE PRACTITIONER

## 2020-07-14 PROCEDURE — 1159F MED LIST DOCD IN RCRD: CPT | Mod: 95,,, | Performed by: NURSE PRACTITIONER

## 2020-07-14 PROCEDURE — 1126F AMNT PAIN NOTED NONE PRSNT: CPT | Mod: 95,,, | Performed by: NURSE PRACTITIONER

## 2020-07-14 PROCEDURE — 99443 PR PHYSICIAN TELEPHONE EVALUATION 21-30 MIN: CPT | Mod: 95,,, | Performed by: NURSE PRACTITIONER

## 2020-07-14 NOTE — PROGRESS NOTES
Established Patient - Audio Only Telehealth Visit     The patient location is: Home  The chief complaint leading to consultation is: Palliative Care   Visit type: Audio Visit  Total time spent with patient: 39 mins  On  10:22am   Off 11:02am       The reason for the audio only service rather than synchronous audio and video virtual visit was related to technical difficulties or patient preference/necessity.     Each patient to whom I provide medical services by telemedicine is:  (1) informed of the relationship between the physician and patient and the respective role of any other health care provider with respect to management of the patient; and (2) notified that they may decline to receive medical services by telemedicine and may withdraw from such care at any time. Patient verbally consented to receive this service via voice-only telephone call.    [email or web address]  Palliative Care Audio Visit    Visit Date: 2020  Encounter Provider: Kiya Landin DNP, FNP-C  PCP:  Olvin Mars MD    Subjective:      Patient ID: Naty St is a 74 y.o. female.    Consult Requested By:  Shaka Ann M.D.  Reason for Consult:  Palliative Care      Chief Complaint:  Palliative Care Follow-up visit    Outpatient Palliative Care Encounter:    Ms. Naty St is a 73 y/o female with PMHx of Lung Cancer metastatic to brain, pancreatic cancer (), breast cancer (), hypertension, blood clot in vein (on anticoagulant long-term use), seizures, stroke, psychiatric problem, brain surgery with tumor removal (2016), cholecystectomy, whipple (), oophorectomy (), hysterectomy (), and kidney stone surgery.     Social History:    Patient resides at home with her  and son. She reports she has been  for 31 years and has 4 children (not with current ). She has 2 siblings, 1 sister (lives in [place]) and 1 brother ().     Impression:    Audio visit done for palliative  "follow-up visit today. Patient on phone line, Awake, alert, and oriented x 3, forgetful at times. Patient states she is doing okay with no c/o pain. Expresses she is having more emotional issues with the loss of her son and feels sad when she thinks about Bentley (her son), especially when she is alone.  She states she has phone conversations with family  on the phone, which she finds helpful. She reports having a good support system with her , daughter, and 2 sons.     Ms. St denies SOB, +np cough, +fatigue (worse at night), denies sore throat, but she feels hoarse with a low voice every morning but then gets louder as the day goes. Reports having an EGD and "they found blood in my stomach," so she was started on Protonix.     She is supposed to have MRI this week 2/2 to having intermittent prolapsed rectum. She expresses she is worried and scared of having colostomy bag.     She recently had PET scan done and oncologist will determine if she will need further chemo.    Goals of Care:    [place] form and [facility] Advance Care Directives form with patient.   PatieReviewed nt completed [place] form. Would like to be DNR. Discussed hospice benefits but patient would like to continue with palliative care.     Discussed goals of care with patient. She would like to feel better and continue to feel better. Reports she doesn't want to give up but to keep fighting and move forward.      PLAN:  1. Palliative care to follow-up with patient in 4 weeks on 8/6  2. Continue all medications  3. Patient to complete advance directives  4. Offer supportive to care to the patient and family  5. In emergency, call 911 or go to ED; notify PCP's office  6. Encourage rest periods    Review of Systems   Constitutional: Negative for activity change, appetite change (appetite is fair), chills, fatigue and fever.   HENT: Positive for rhinorrhea. Negative for congestion, postnasal drip, sinus pressure, sinus pain, sneezing, sore " throat and trouble swallowing.  +hoarseness   Eyes: Positive for visual disturbance (glasses).   Respiratory: Positive for cough (occasional NP cough) and shortness of breath (SOB with exertion). Negative for choking, chest tightness and wheezing.    Cardiovascular: Negative for chest pain, palpitations and leg swelling.   Gastrointestinal: Negative for abdominal distention, abdominal pain, blood in stool, constipation, diarrhea, nausea and vomiting. +prolapsed rectum at times       +appetite fluctuates  Endocrine: Negative for polyuria.   Genitourinary: Negative for difficulty urinating and hematuria.   Musculoskeletal: Negative for arthralgias, back pain, gait problem, joint swelling, myalgias, neck pain and neck stiffness.   Skin: Positive for pallor. Negative for rash and wound.   Neurological: Positive for weakness (generalized weakness). Negative for dizziness, tremors, seizures, syncope, light-headedness and headaches.   Hematological: Bruises/bleeds easily.   Psychiatric/Behavioral: Negative for confusion, hallucinations, self-injury, sleep disturbance and suicidal ideas. The patient is nervous/anxious (since death of her son)      Assessments:  · Environmental: single story home; good lighting; uncluttered; lives with spouse and son  · Functional Status: independent with all ADLs,   · Safety: someone present at all times; fall precautions  · Nutritional: 3 meals; snacks, protein shakes  · Home Health/DME/Supplies: cane, tub chair, toilet chair, walker    Symptom Assessment (ESAS 0-10 scale)       ESAS 0 1 2 3 4 5 6 7 8 9 10   Pain X             Dyspnea x             Anxiety    x          Nausea X             Depression      x         Anorexia     X         Fatigue      x        Insomnia X             Restlessness  X             Agitation X               Constipation    no  Bowel Management Plan (BMP): no  Diarrhea        no  Comments: 7/14/2020 normal, last few weeks has been normal    Performance Status:    PPS Score 60  Karnofsky Score:  60  EGOC:  2    History:  Past Medical History:   Diagnosis Date    Anticoagulant long-term use     Blood clot in vein 06/2016    Breast cancer 1994    Cataract     Hypertension     Lung cancer     Lung cancer metastatic to brain     Pancreatic cancer 1998    Posterior capsular opacification, left eye     Psychiatric problem     Seizures 01/25/2016    Stroke     Therapy      Family History   Problem Relation Age of Onset    Breast cancer Mother     Lung cancer Mother     Leukemia Paternal Grandfather     Stomach cancer Paternal Grandmother     Colon cancer Neg Hx     Ovarian cancer Neg Hx      Past Surgical History:   Procedure Laterality Date    BONE BIOSPY  3/9/16    BRAIN SURGERY  1/12/16    tumor removal 1/12/16    BREAST LUMPECTOMY  1998    left    CATARACT EXTRACTION Bilateral 2004    yag OU    CHOLECYSTECTOMY  1998    COLONOSCOPY N/A 11/13/2015    Procedure: COLONOSCOPY;  Surgeon: Alex Carmona MD;  Location: [facility] ENDO (4TH FLR);  Service: Endoscopy;  Laterality: N/A;  2 year f/u    COLONOSCOPY N/A 11/7/2018    Procedure: COLONOSCOPY;  Surgeon: Jim Raman MD;  Location: [facility] ENDO (4TH FLR);  Service: Endoscopy;  Laterality: N/A;  Eliquis - per Dr. Vazquez -ok to hold Eliquis x 2 days prior to colon- ERW    cysto and right ureteral stent  4/27/12    ecoli  2010    removal of blockage, done throat, then through the liver.    HYSTERECTOMY  1974    KIDNEY STONE SURGERY  2010--laser    left eswl  6/20/12    OOPHORECTOMY  4/25/2012    Laparoscopic BSO, lysis of adhesions, cystoscopy greater than 35mins     WHIPPLE PROCEDURE W/ LAPAROSCOPY  1998     Review of patient's allergies indicates:   Allergen Reactions    Tobradex [tobramycin-dexamethasone] Swelling    Iodinated contrast media Hives    Latex Rash and Hives    Phenytoin sodium extended Other (See Comments) and Rash       Medications:    Current Outpatient Medications:      albuterol-ipratropium (DUO-NEB) 2.5 mg-0.5 mg/3 mL nebulizer solution, Take 3 mLs by nebulization every 6 (six) hours as needed for Wheezing or Shortness of Breath. Rescue, Disp: 1 Box, Rfl: 3    amitriptyline (ELAVIL) 50 MG tablet, Take 1 tablet (50 mg total) by mouth every evening., Disp: 90 tablet, Rfl: 12    calcium carbonate (OS-UNIQUE) 500 mg calcium (1,250 mg) tablet, Take 2 tablets (1,000 mg total) by mouth once daily., Disp: , Rfl: 0    clindamycin phosphate 1% (CLINDAGEL) 1 % gel, APPLY TOPICALLY TWICE DAILY, Disp: 60 g, Rfl: 6    CREON CpDR, TAKE ONE CAPSULE BY MOUTH THREE TIMES A DAY WITH MEALS, Disp: 270 capsule, Rfl: 3    denosumab (XGEVA) 120 mg/1.7 mL (70 mg/mL) Soln, Inject 120 mg into the skin every 28 days., Disp: , Rfl:     dronabinol (MARINOL) 5 MG capsule, Take 1 capsule (5 mg total) by mouth 2 (two) times daily before meals., Disp: 60 capsule, Rfl: 3    ELIQUIS 5 mg Tab, TAKE ONE TABLET BY MOUTH TWICE DAILY, Disp: 60 tablet, Rfl: 6    erlotinib (TARCEVA) 150 MG tablet, Take 1 tablet (150 mg total) by mouth once daily Hold medication until told to restart by Dr. Vazquez., Disp: 30 tablet, Rfl: 11    furosemide (LASIX) 40 MG tablet, Take 1 tablet (40 mg total) by mouth daily as needed (swelling)., Disp: 30 tablet, Rfl: 1    levETIRAcetam (KEPPRA) 500 MG Tab, TAKE ONE TABLET BY MOUTH TWICE DAILY, Disp: 180 tablet, Rfl: 3    LORazepam (ATIVAN) 1 MG tablet, Take 1 tablet (1 mg total) by mouth 2 (two) times daily., Disp: 60 tablet, Rfl: 5    losartan (COZAAR) 50 MG tablet, TAKE ONE TABLET BY MOUTH TWICE DAILY, Disp: 60 tablet, Rfl: 10    metoprolol succinate (TOPROL-XL) 50 MG 24 hr tablet, TAKE ONE TABLET BY MOUTH ONCE DAILY. hold UNTIL your appointment WITH your pcp. heart rate and blood pressure has been normal off OF this me, Disp: 30 tablet, Rfl: 11    multivitamin (THERAGRAN) per tablet, Take 1 tablet by mouth once daily., Disp: , Rfl:     MYRBETRIQ 50 mg Tb24, TAKE ONE TABLET BY  MOUTH ONCE DAILY, Disp: 30 tablet, Rfl: 11    polyethylene glycol (GLYCOLAX) 17 gram/dose powder, , Disp: , Rfl:     senna-docusate 8.6-50 mg (SENNA LAXATIVE-STOOL SOFTENER) 8.6-50 mg per tablet, Take 1 tablet by mouth once daily., Disp: , Rfl:     24h Oral Morphine Equivalents (OME):  n/a    Objective:     Physical Exam:    There is no height or weight on file to calculate BMI.    Physical Exam   Unable to do physical exam>>audio visit done today    Labs:  CBC:   WBC   Date Value Ref Range Status   02/24/2020 7.54 3.90 - 12.70 K/uL Final      82 -  Hemoglobin   Date Value Ref Range Status   02/24/2020 9.9 (L) 12.0 - 16.0 g/dL Final   FT  POC Hematocrit   Date Value Ref Range Status   02/21/2020 30 (L) 36 - 54 %PCV Final     Hematocrit   Date Value Ref Range Status   02/24/2020 33.0 (L) 37.0 - 48.5 % Final   e Mean Corpuscular Volume   Date Value Ref Range Status   02/24/2020 86 82 - 98 fL Final   Value Ref Range Status   02/24/2020 3.2 (L) 3.5 - 5.2 g/dL Final     Protein:   Total Protein   Date Value Ref Range Status   02/24/2020 7.4 6.0 - 8.4 g/dL Final       Radiology:  I have reviewed all pertinent imaging results/findings within the past 24 hours.    Psychosocial/Cultural/Spiritual:  · F- [place] and Belief:  Denominational   · I - Importance: yes  · C - Community: [facility]  · A - Address in Care: Goes to [employer] occasionally; no spiritual needs identified      Assessment:     1. Palliative care encounter  2. Counseling regarding Advance directives and goals of care  3. Fatigue  4. Non-productive cough  5. Generalized weakness  6. depression      Plan:     Ethical / Legal: Advance Care Planning   · Surrogate decision maker:  Name: Javier St, Relationship:   · Code Status:  DNR  · [place]:  Left form at patient's house to review  · Other advance directive: no , left [facility] Advance Directives at patient's house  · Capacity to make medical decisions:  yes,   · Conflict: none        Total 39 mins (47520)  >50% of 39 min was spent on counseling and coordination of care. The following issues were discussed: lung cancer, hoarse, fatigue, PET scan, chemo tx    15 mins of 39min vis spent on Advance directives, goals of care discussion      Were controlled substances prescribed?  No    Assessment/Plan:    Palliative Care Encounter  -initiated palliative care  -Reviewed [place] and Ads   -DNR    Counseling regarding advance directives and goals of care  -Reviewed goals of care  -would like to become stronger and gain more strength  -wants to keep fighting    Non-productive cough  -OTC medication  -continue lasix    Hoarse  Claritin for post nasal drip    Generalized weakness  -fall precautions  -rest periods    Depression, unspecified  -continue Elavil     Fatigue, unspecified  -rest periods      Follow Up Appointments:   Future Appointments   Date Time Provider Department Center   3/5/2020  1:00 PM JULIO UROLOGY [facility] CYSTO4 [facility]   3/18/2020  8:50 AM LAB, [facility] CANCER BLDG [facility]   3/18/2020 10:00 AM Devika Chappell PA-C [facility] HEM ONC [facility]   3/18/2020 10:30 AM INJECTION, [facility] INFUSION [facility]   3/19/2020  1:30 PM Nel Ramirez NP [address]   3/27/2020 10:00 AM PALLIATIVE AND SUPPORT CARE-HEMOC [facility] HEM ONC [facility]   4/23/2020  9:30 AM [facility] PET CT LIMIT 500 LBS [facility] PET CT [facility]   4/30/2020  8:30 AM LAB, [facility] CANCER BLDG [facility]   4/30/2020  9:40 AM Karrie Vazquez MD [facility] HEM ONC [facility]   4/30/2020 10:00 AM INJECTION, [facility] INFUSION [facility]     Patient and family agreed via verbal consent to access medical records and for assessment and treatment during this visit.       This service was not originating from a related E/M service provided within the previous 7 days nor will  to an E/M service or procedure within the next 24 hours or my soonest available appointment.   Prevailing standard of care was able to be met in this audio-only visit.        Signature:  Kiya Landin DNP, FNP-C  [email or web address]

## 2020-07-15 ENCOUNTER — OFFICE VISIT (OUTPATIENT)
Dept: HEMATOLOGY/ONCOLOGY | Facility: CLINIC | Age: 75
End: 2020-07-15
Payer: MEDICARE

## 2020-07-15 ENCOUNTER — INFUSION (OUTPATIENT)
Dept: INFUSION THERAPY | Facility: HOSPITAL | Age: 75
End: 2020-07-15
Attending: INTERNAL MEDICINE
Payer: MEDICARE

## 2020-07-15 VITALS
DIASTOLIC BLOOD PRESSURE: 58 MMHG | BODY MASS INDEX: 21.83 KG/M2 | RESPIRATION RATE: 16 BRPM | SYSTOLIC BLOOD PRESSURE: 107 MMHG | WEIGHT: 135.81 LBS | HEART RATE: 90 BPM | OXYGEN SATURATION: 97 % | HEIGHT: 66 IN | TEMPERATURE: 99 F

## 2020-07-15 DIAGNOSIS — C34.90 ADENOCARCINOMA OF LUNG, UNSPECIFIED LATERALITY: ICD-10-CM

## 2020-07-15 DIAGNOSIS — C34.31 MALIGNANT NEOPLASM OF LOWER LOBE OF RIGHT LUNG: Primary | ICD-10-CM

## 2020-07-15 DIAGNOSIS — C34.90 ADENOCARCINOMA OF LUNG, UNSPECIFIED LATERALITY: Primary | ICD-10-CM

## 2020-07-15 DIAGNOSIS — K62.89 PROCTITIS: ICD-10-CM

## 2020-07-15 DIAGNOSIS — D50.0 ANEMIA DUE TO CHRONIC BLOOD LOSS: ICD-10-CM

## 2020-07-15 DIAGNOSIS — N18.30 STAGE 3 CHRONIC KIDNEY DISEASE: ICD-10-CM

## 2020-07-15 DIAGNOSIS — C34.32 MALIGNANT NEOPLASM OF LOWER LOBE, LEFT BRONCHUS OR LUNG: ICD-10-CM

## 2020-07-15 DIAGNOSIS — C79.51 SECONDARY CANCER OF BONE: ICD-10-CM

## 2020-07-15 DIAGNOSIS — D50.0 IRON DEFICIENCY ANEMIA DUE TO CHRONIC BLOOD LOSS: ICD-10-CM

## 2020-07-15 PROCEDURE — 99499 UNLISTED E&M SERVICE: CPT | Mod: S$GLB,,, | Performed by: INTERNAL MEDICINE

## 2020-07-15 PROCEDURE — 99999 PR PBB SHADOW E&M-EST. PATIENT-LVL V: CPT | Mod: PBBFAC,HCNC,, | Performed by: INTERNAL MEDICINE

## 2020-07-15 PROCEDURE — 3078F DIAST BP <80 MM HG: CPT | Mod: HCNC,CPTII,S$GLB, | Performed by: INTERNAL MEDICINE

## 2020-07-15 PROCEDURE — 1101F PT FALLS ASSESS-DOCD LE1/YR: CPT | Mod: HCNC,CPTII,S$GLB, | Performed by: INTERNAL MEDICINE

## 2020-07-15 PROCEDURE — 1159F PR MEDICATION LIST DOCUMENTED IN MEDICAL RECORD: ICD-10-PCS | Mod: HCNC,S$GLB,, | Performed by: INTERNAL MEDICINE

## 2020-07-15 PROCEDURE — 3074F PR MOST RECENT SYSTOLIC BLOOD PRESSURE < 130 MM HG: ICD-10-PCS | Mod: HCNC,CPTII,S$GLB, | Performed by: INTERNAL MEDICINE

## 2020-07-15 PROCEDURE — 96372 THER/PROPH/DIAG INJ SC/IM: CPT | Mod: HCNC

## 2020-07-15 PROCEDURE — 99215 PR OFFICE/OUTPT VISIT, EST, LEVL V, 40-54 MIN: ICD-10-PCS | Mod: HCNC,S$GLB,, | Performed by: INTERNAL MEDICINE

## 2020-07-15 PROCEDURE — 3074F SYST BP LT 130 MM HG: CPT | Mod: HCNC,CPTII,S$GLB, | Performed by: INTERNAL MEDICINE

## 2020-07-15 PROCEDURE — 1126F PR PAIN SEVERITY QUANTIFIED, NO PAIN PRESENT: ICD-10-PCS | Mod: HCNC,S$GLB,, | Performed by: INTERNAL MEDICINE

## 2020-07-15 PROCEDURE — 63600175 PHARM REV CODE 636 W HCPCS: Mod: JG,HCNC | Performed by: INTERNAL MEDICINE

## 2020-07-15 PROCEDURE — 1101F PR PT FALLS ASSESS DOC 0-1 FALLS W/OUT INJ PAST YR: ICD-10-PCS | Mod: HCNC,CPTII,S$GLB, | Performed by: INTERNAL MEDICINE

## 2020-07-15 PROCEDURE — 1126F AMNT PAIN NOTED NONE PRSNT: CPT | Mod: HCNC,S$GLB,, | Performed by: INTERNAL MEDICINE

## 2020-07-15 PROCEDURE — 1159F MED LIST DOCD IN RCRD: CPT | Mod: HCNC,S$GLB,, | Performed by: INTERNAL MEDICINE

## 2020-07-15 PROCEDURE — 99215 OFFICE O/P EST HI 40 MIN: CPT | Mod: HCNC,S$GLB,, | Performed by: INTERNAL MEDICINE

## 2020-07-15 PROCEDURE — 3078F PR MOST RECENT DIASTOLIC BLOOD PRESSURE < 80 MM HG: ICD-10-PCS | Mod: HCNC,CPTII,S$GLB, | Performed by: INTERNAL MEDICINE

## 2020-07-15 PROCEDURE — 99499 RISK ADDL DX/OHS AUDIT: ICD-10-PCS | Mod: S$GLB,,, | Performed by: INTERNAL MEDICINE

## 2020-07-15 PROCEDURE — 99999 PR PBB SHADOW E&M-EST. PATIENT-LVL V: ICD-10-PCS | Mod: PBBFAC,HCNC,, | Performed by: INTERNAL MEDICINE

## 2020-07-15 RX ORDER — ERLOTINIB HYDROCHLORIDE 150 MG/1
150 TABLET, FILM COATED ORAL DAILY
Qty: 30 TABLET | Refills: 3 | Status: SHIPPED | OUTPATIENT
Start: 2020-07-15 | End: 2020-08-11

## 2020-07-15 RX ADMIN — DENOSUMAB 120 MG: 120 INJECTION SUBCUTANEOUS at 12:07

## 2020-07-15 NOTE — Clinical Note
She says that Yadiel sent her a letter that Tarceva is not covered. Can you check with [facility] if that is correct and what we need to do

## 2020-07-15 NOTE — PROGRESS NOTES
"Subjective:       Patient ID: Naty St is a 74 y.o. female.    Chief Complaint: Adenocarcinoma of lung, right and Malignant neoplasm of lower lobe of right lung  Oncologic History:  Ms. Naty St was admitted to the hospital between 01/25/2016 and 01/28/2016. She has a history of pancreatic cancer 17 years ago, breast cancer and meningioma, presented to the hospital complaining of loss of consciousness. The patient apparently was in her normal state of health and she stood up to go to the bathroom and fell to the floor. The patient's daughter helped the mother up in the bathroom and noted that her mother's upper extremities were shaking and eye rolling. No reports of bowel or bladder incontinence, tongue biting or rolling. The second episode lasted about four minutes and she was extremely lethargic following that. Apparently, the patient had a meningioma resection done in the past; however, recently, underwent neurosurgical resection with Dr. 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    Additional immunohistochemical stains were performed which revealed the tumor cells to be are positive for TTF1 and negative for ER and GCDFP. The morphology and TTF1 positivity are most consistent with lung primary.    Of note, imaging scan at the end of January 2016 revealed a mass in the lung at 2 cm in the medial aspect of the apical segment of the right upper lobe abutting the mediastinum at the level of the azygous vein and abutting and possibly encasing the segmental bronchi and vessels of the apical segment of the right upper lobe and no pleural fluid was present. Also, there is an enlarged right paratracheal lymph node. No evidence of any metastatic disease at the pancreatic site with postoperative changes post Whipple disease  Her PET Scan from 2/15/16 reveal "Hypermetabolic mass in the right " "lung apex consistent with a primary malignancy. Hypermetabolic mediastinal lymph nodes consistent with metastatic disease. Right sacral hypermetabolic lesion consistent with metastatic disease, noting additional mildly sclerotic lesions in multiple vertebral bodies which do not demonstrate abnormal hypermetabolism  She underwent IR bone biopsy which revealed metastatic adenocarcinoma of lung origin. 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  She has completed focal RT to brain lesions in March 2016.    PET scan from 6/6/16 shows "Dramatic almost complete response to therapy."  Lovenox started after US lower ext 6/7/16 shows Acute complete occlusion of one of the left posterior tibial vein.Remote partial thrombus of the proximal left superficial femoral vein.  She is on Tarceva.   12/6/16 PET scan reveals "Stable right upper lobe lesion and right hilar lymph node. No new lesions identified. Trace left pleural effusion".  1/27/17 Renal u/s "Medical renal disease. Nonobstructive right nephrolithiasis"  1/31/17 PET - "Right upper lobe nodule and right hilar lymph node similar and very low grade activity.  There is no definite evidence of recurrence."   11/9/17 PET "In this patient with history of lung cancer, there is interval increase in size and hypermetabolism of a right upper lobe lung lesion concerning for recurrent disease. Additionally, there is interval appearance of a hypermetabolic paratracheal lymph node suspicious for metastatic disease"     She progressed on Tarceva She underwent EBUS on 12/5/17. Pathology revealed adenocarcinoma but T790m could not be done as quantity was not insufficient. Her PET scan from 1/30/18 revealed The patient's previously identified abnormal lesions is slightly greater uptake of FDG on today's study compared with prior exam. These findings are concerning for progression of disease"      She was hospitalized between 4/9/18-4/16/18. Her hospital course was as follows "Patient was " "admitted to hemonc service on 4/9 with acute hypoxic respiratory failure. She was requiring 4 L of nc on admission. She was started on moxi for CAP. She received one dose of ceftriaxone in the ED. On 2nd day of admission she spiked a fever. Her Hb was ~7 g/dl so she was transfused 1 unit of PRBCs. Over night she developed worsening of her symptoms with increased O2 requirements to 15 L HFNC. Her CXR showed worsening congestion. There was a concern of TRALI vs TACO. New 2D echo with diastolic dysfunction. She was given IV lasix pushes as needed and was started on dexamethasone.  Her abx was escalated to vanc and cefepime. She improved with diuresis and steroids. Pulmonary were consulted. Her O2 requirements continued to improve. On 4/15 she was switched to Augmentin. PT recommended discharging her to SNF but the patient refused and she wanted to go home with home health. She qualified for home oxygen so she was discharged on 3 L nc while at rest and 6 while active. Augmentin and dexamethasone were discontinued on discharge"     She was readmitted from 4/27 to 5/3/18 with who presented to the ED with a complaint of fatigue and worsening DELA CRUZ. Pt diagnosed PNA 4/9 and was discharged on 4/16 on 3L oxygen. She was initially placed on cefepime for 3 days followed by an add'l 2 days of Augmentin. Pulm was consulted with assistance as her oxygen requirements were increasing. She was placed on oral steroids, then trailed on 80mg TID solumedrol for 2 days and will be discharged on a prednisone taper. She was also diuresed with 2 days of 40mg IV lasix with appropriate UOP resulting, improvement on repeat CXR. She was medically stable and appropriate for DC home with home health on 1L continuous O2. She will be seen for close f/u and may be able to come off oxygen at that time.      She discontinued Tagrisso in April 2018. Restaging scans from 6/4/18 revealed "Stable appearance of a spiculated right upper lobe pulmonary lesion. " " Additional irregular focus superior to the lesion in the right lung apex is stable and may represent scar or additional lesion; although this was not hypermetabolic on prior PET-CT.  No new pulmonary lesions. 1.3 cm subcarinal lymph node, not hypermetabolic on prior PET-CT. Stable postsurgical changes of a Whipple procedure. Bilateral nonobstructing nephrolithiasis.  Stable sclerotic focus in the T5 vertebral body, possibly a bone island as this was not hypermetabolic on prior PET-CT. Small hiatal hernia. RECIST SUMMARY: Date of prior examination for comparison 2018 Lesion 1: Right upper lobe 1.3 cm Series 2 image 31 prior measurement 1.3 cm"  Bone scan also from 18 revealed no evidence of mets.     Her PET scan from 18 revealed "Right suprahilar lesion SUV max 3.76, previously 6.86. Pretracheal lymph node SUV max 2.55, previously 3.79. There is physiologic intracranial, head, and neck activity.  There is muscle activation.  There is vocal cord activation.  There is physiologic liver, spleen, GI and  activity.  There is a hiatal hernia.  Pelvic organs show nothing unusual.  No bone lesions are seen.  There are areas of benign appearing lung scar"  She notes fatigue.  She denies any nausea, vomiting, diarrhea, constipation, abdominal pain, weight loss or loss of appetite, chest pain, shortness of breath, leg swelling, fatigue, pain, headache, dizziness, or mood changes. Her ECOG PS is 2. She is accompanied by her  and son     She has been on Tarceva since 18. She has now completed SBRT to her right lung lesions.          HPI She comes in to review her PET scan which reveals "Patient was administered 11.8 mCi of FDG intravenously. Index right pretracheal lymph node SUV max 5.06, previously 4.1. Index right hilum SUV max 2.67, previously 3.1. No bone lesions"  She is very depressed as her son , she thinks he  from his cardiac issues. She has proctitis noted on the colonoscopy in " June which was done for worsening iron deficiency anemia, the prep was poor, she was referred to see Dr. gaffney who recommended a repeat colonoscopy and MRI defecography. She no showed for the MRI and has not scheduled the colonoscopy yet     Review of Systems   Constitutional: Negative for appetite change, fatigue and unexpected weight change.   HENT: Negative for mouth sores.    Eyes: Negative for visual disturbance.   Respiratory: Negative for cough and shortness of breath.    Cardiovascular: Negative for chest pain.   Gastrointestinal: Negative for abdominal pain and diarrhea.   Genitourinary: Negative for frequency.   Musculoskeletal: Negative for back pain.   Integumentary:  Negative for rash.   Neurological: Negative for headaches.   Hematological: Negative for adenopathy.   Psychiatric/Behavioral: Positive for dysphoric mood. The patient is not nervous/anxious.    All other systems reviewed and are negative.        Objective:      Physical Exam  Vitals signs reviewed.   Constitutional:       Appearance: She is well-developed.   HENT:      Mouth/Throat:      Pharynx: No oropharyngeal exudate.   Cardiovascular:      Rate and Rhythm: Normal rate.      Heart sounds: Normal heart sounds.   Pulmonary:      Effort: Pulmonary effort is normal.      Breath sounds: Normal breath sounds. No wheezing.   Abdominal:      General: Bowel sounds are normal.      Palpations: Abdomen is soft.      Tenderness: There is no abdominal tenderness.   Musculoskeletal:         General: No tenderness.   Lymphadenopathy:      Cervical: No cervical adenopathy.   Skin:     General: Skin is warm and dry.      Findings: No rash.   Neurological:      Mental Status: She is alert and oriented to person, place, and time.      Coordination: Coordination normal.   Psychiatric:         Thought Content: Thought content normal.         Judgment: Judgment normal.           LABs:  WBC   Date Value Ref Range Status   04/23/2020 5.36 3.90 - 12.70 K/uL  Final     Hemoglobin   Date Value Ref Range Status   04/23/2020 7.8 (L) 12.0 - 16.0 g/dL Final     POC Hematocrit   Date Value Ref Range Status   02/21/2020 30 (L) 36 - 54 %PCV Final     Hematocrit   Date Value Ref Range Status   04/23/2020 28.2 (L) 37.0 - 48.5 % Final     Platelets   Date Value Ref Range Status   04/23/2020 326 150 - 350 K/uL Final     Gran # (ANC)   Date Value Ref Range Status   04/23/2020 3.1 1.8 - 7.7 K/uL Final     Comment:     The ANC is based on a white cell differential from an   automated cell counter. It has not been microscopically   reviewed for the presence of abnormal cells. Clinical   correlation is required.         Chemistry        Component Value Date/Time     07/13/2020 0837    K 3.3 (L) 07/13/2020 0837     (H) 07/13/2020 0837    CO2 20 (L) 07/13/2020 0837    BUN 29 (H) 07/13/2020 0837    CREATININE 1.9 (H) 07/13/2020 0837    GLU 88 07/13/2020 0837        Component Value Date/Time    CALCIUM 7.8 (L) 07/13/2020 0837    ALKPHOS 72 07/13/2020 0837    AST 26 07/13/2020 0837    ALT 16 07/13/2020 0837    BILITOT 0.6 07/13/2020 0837    ESTGFRAFRICA 29.3 (A) 07/13/2020 0837    EGFRNONAA 25.4 (A) 07/13/2020 0837          Assessment:       1. Adenocarcinoma of lung, unspecified laterality    2. Secondary cancer of bone    3. Anemia due to chronic blood loss    4. Stage 3 chronic kidney disease    5. Iron deficiency anemia due to chronic blood loss    6. Proctitis        Plan:         1,2. Reviewed PET scan which was stable, she will continue with tarceva and will return in 3 months with labs and PET scan. She will receive Xgeva today and then will discontinue since her PET scan has no bone lesions  3,5,6 Advised her to follow-up with [facility], she missed her MRI and has not scheduled her colonoscopy. Sent message to [facility].  4. Stable monitor.    Above care plan was discussed with patient and accompanying son, Basil and all questions were addressed to their satisfaction

## 2020-07-15 NOTE — NURSING
Pt tolerated  Xgeva injection to the abdomen today. NAD.declined AVS. Uses my [facility]. Discharged home. Ambulated independently to w/c with family.

## 2020-07-17 ENCOUNTER — TELEPHONE (OUTPATIENT)
Dept: PHARMACY | Facility: CLINIC | Age: 75
End: 2020-07-17

## 2020-07-17 NOTE — TELEPHONE ENCOUNTER
DOCUMENTATION ONLY:  Prior authorization for Tarceva 150 mg Tablet #30/30 approved from 07/16/2020 to 12/31/2020  Case ID: [id number]    Co-pay: $8.95    Patient Assistance IS NOT required. Sending to the clinical pharmacist for  and shipment. Aniket ROSARIO

## 2020-07-20 ENCOUNTER — TELEPHONE (OUTPATIENT)
Dept: HEMATOLOGY/ONCOLOGY | Facility: CLINIC | Age: 75
End: 2020-07-20

## 2020-07-20 NOTE — TELEPHONE ENCOUNTER
Spoke with patient. Reviewed chart---  Informed her chemo --tarceva---is approved.  She thanked nurse.

## 2020-07-20 NOTE — PATIENT INSTRUCTIONS
INSTRUCTIONS:    1. Follow-up with palliative care NP in 4 weeks on 8/6  2. Continue all medications  3. Fall precautions at all times  4. F/u with PCP as needed  5. Family and patient to practice social distancing  6. Patient to have 6 feet between each other  7. In an Emergency, call 911 or go to ED; notify PCP's office

## 2020-07-20 NOTE — TELEPHONE ENCOUNTER
----- Message from Alina Sigala sent at 7/20/2020  3:13 PM CDT -----  Contact: Patient  Patient Advice/Staff Message     Caller name/title: Pt    Reason for call: Pt calling to follow up with Zandra, wants to know if she got in touch with [employer] yet     Do you feel you need to be seen today:: No        Communication Preference: [phone]    Additional Information:

## 2020-07-23 ENCOUNTER — TELEPHONE (OUTPATIENT)
Dept: PHARMACY | Facility: CLINIC | Age: 75
End: 2020-07-23

## 2020-07-23 NOTE — TELEPHONE ENCOUNTER
"Initial [facility] consult for Tarceva declined - patient existing to therapy and states she is "very familiar" with it. Pt confirms ok to fill for pickup 7/23/2020. $8.95 copay. Dose: 1 tablet (150 mg total) by mouth once daily appropriate for diagnosis of Malignant neoplasm of lower lobe of right lung [C34.31]. Reviewed med list in chart and DDIs with Tarceva addressed - patient will be counseled to stop pantoprazole and start pepcid -  the dose from Tarceva (Tarceva 2 hours before or 10 hours after pepcid).             "

## 2020-07-23 NOTE — TELEPHONE ENCOUNTER
Call attempt 1 for initial Tarceva consult and dispensing. Patient existing to therapy and switching from [facility]. Two call attempts to preferred number were connected then disconnected. Call attempt to daughter [phone]) LVM.

## 2020-07-27 ENCOUNTER — TELEPHONE (OUTPATIENT)
Dept: HEMATOLOGY/ONCOLOGY | Facility: CLINIC | Age: 75
End: 2020-07-27

## 2020-07-27 ENCOUNTER — TELEPHONE (OUTPATIENT)
Dept: SURGERY | Facility: CLINIC | Age: 75
End: 2020-07-27

## 2020-07-27 NOTE — TELEPHONE ENCOUNTER
"----- Message from Surya Cheatham sent at 7/27/2020  2:07 PM CDT -----  Consult/Advisory:    Name Of Caller: Basil   Relationship to the Pt?: Son   Contact Preference?:[phone]    Provider Name: Dr Vazquez     What is the nature of the call?:  Pt son request a callback to discuss referrals sent to other departments     Additional Notes:  "Thank you for all that you do for our patients'"           "

## 2020-07-27 NOTE — TELEPHONE ENCOUNTER
Spoke with patient's son. He is calling to ask for nurse to send a message to dr gaffney's office about the MRI defecography his mom missed.      Message routed to dr gaffney's office

## 2020-08-05 NOTE — TELEPHONE ENCOUNTER
Contacted patient for [place] 7 day touchbase. She declined discussing in detail as she is busy and not new to the medication, but reports taking the medication as she was instructed to and that she is having no problems with it. Based on chart notes, patient has been on medications for >4 years (started on 3/22/2016). Advised her to reach out to [facility] if she has any problems or questions/concerns. Patient verbalized understanding.

## 2020-08-06 ENCOUNTER — CARE AT HOME (OUTPATIENT)
Dept: HOME HEALTH SERVICES | Facility: CLINIC | Age: 75
End: 2020-08-06
Payer: MEDICARE

## 2020-08-06 DIAGNOSIS — Z71.89 COUNSELING REGARDING ADVANCE CARE PLANNING AND GOALS OF CARE: ICD-10-CM

## 2020-08-06 DIAGNOSIS — R53.83 FATIGUE, UNSPECIFIED TYPE: ICD-10-CM

## 2020-08-06 DIAGNOSIS — R49.0 HOARSENESS: ICD-10-CM

## 2020-08-06 DIAGNOSIS — Z51.5 PALLIATIVE CARE ENCOUNTER: Primary | ICD-10-CM

## 2020-08-06 DIAGNOSIS — F32.A DEPRESSION, UNSPECIFIED DEPRESSION TYPE: ICD-10-CM

## 2020-08-06 DIAGNOSIS — R53.1 GENERALIZED WEAKNESS: ICD-10-CM

## 2020-08-06 PROCEDURE — 99443 PR PHYSICIAN TELEPHONE EVALUATION 21-30 MIN: ICD-10-PCS | Mod: 95,,, | Performed by: NURSE PRACTITIONER

## 2020-08-06 PROCEDURE — 99497 ADVNCD CARE PLAN 30 MIN: CPT | Mod: 95,,, | Performed by: NURSE PRACTITIONER

## 2020-08-06 PROCEDURE — 99443 PR PHYSICIAN TELEPHONE EVALUATION 21-30 MIN: CPT | Mod: 95,,, | Performed by: NURSE PRACTITIONER

## 2020-08-06 PROCEDURE — 99497 PR ADVNCD CARE PLAN 30 MIN: ICD-10-PCS | Mod: 95,,, | Performed by: NURSE PRACTITIONER

## 2020-08-06 NOTE — PROGRESS NOTES
Established Patient - Audio Only Telehealth Visit     The patient location is: Home  The chief complaint leading to consultation is: Palliative Care Follow-up  Visit type: Virtual visit with audio only (telephone)  Total time spent with patient: 45mins       The reason for the audio only service rather than synchronous audio and video virtual visit was related to technical difficulties or patient preference/necessity.     Each patient to whom I provide medical services by telemedicine is:  (1) informed of the relationship between the physician and patient and the respective role of any other health care provider with respect to management of the patient; and (2) notified that they may decline to receive medical services by telemedicine and may withdraw from such care at any time. Patient verbally consented to receive this service via voice-only telephone call.      [email or web address]  Palliative Care Audio Visit    Visit Date: 2020  Encounter Provider: Kiya Landin DNP, FNP-C  PCP:  Olvin Mars MD    Subjective:      Patient ID: Naty St is a 74 y.o. female.    Consult Requested By:  Shaka Ann M.D.  Reason for Consult:  Palliative Care      Chief Complaint:  Palliative Care Follow-up visit    Outpatient Palliative Care Encounter:    Ms. Naty St is a 73 y/o female with PMHx of Lung Cancer metastatic to brain, pancreatic cancer (), breast cancer (), hypertension, blood clot in vein (on anticoagulant long-term use), seizures, stroke, psychiatric problem, brain surgery with tumor removal (2016), cholecystectomy, whipple (), oophorectomy (), hysterectomy (), and kidney stone surgery.     Social History:    Patient resides at home with her  and son. She reports she has been  for 31 years and has 4 adult children (not with current ). She has 2 siblings, 1 sister (lives in [place]) and 1 brother ().     Impression:      Audio visit  done for a palliative care follow-up today. Patient is on the phone. States she is doing well. No c/o pain currently. She reports she is going for a repeat MRI on 8/6. Her appetite is good, and she reports she gained 2 pounds recently. Denies fever, SOB, chills, sore throat; +non-productive cough.  Reports having running only when she is eating. +post nasal drip in back of her throat, relieved with benadryl.  Patient reports occasional dizziness, and she feels off balance at times (has it off and on). Ms. St had PET scan recently and will follow-up with Dr. Vazquez, oncologist to determine if she will need further chemo. Plan to continue Tarceva but she recently stopped taking Protonix and started Pepcid. Denies rectal pain at this time. Ms. St is still grieving the death of her son. She talks about how she misses him.       Note: Patient sees Dr. Vazquez every 3 months.     Goals of Care:    [place] form and [facility] Advance Care Directives form with patient.   PatieReviewed nt completed [place] form. Would like to be DNR. Discussed hospice benefits but patient would like to continue with palliative care.     Discussed goals of care with patient. She would like to feel better and continue to feel better. Reports she doesn't want to give up but to keep fighting and move forward.      PLAN:  1. Palliative care to follow-up with patient in 4-6 weeks on 9/9/2020  2. Continue all medications  3. Patient to complete advance directives  4. Offer supportive to care to the patient and family  5. In emergency, call 911 or go to ED; notify PCP's office  6. Encourage rest periods    Review of Systems   Constitutional: Negative for activity change, appetite change (appetite is fair), chills, fatigue and fever.   HENT: Positive for rhinorrhea. Negative for congestion, postnasal drip, sinus pressure, sinus pain, sneezing, sore throat and trouble swallowing.  +hoarseness   Eyes: Positive for visual disturbance (glasses).    Respiratory: Positive for cough (occasional NP cough) and shortness of breath (SOB with exertion). Negative for choking, chest tightness and wheezing.    Cardiovascular: Negative for chest pain, palpitations and leg swelling.   Gastrointestinal: Negative for abdominal distention, abdominal pain, blood in stool, constipation, diarrhea, nausea and vomiting. +prolapsed rectum at times       +appetite fluctuates  Endocrine: Negative for polyuria.   Genitourinary: Negative for difficulty urinating and hematuria.   Musculoskeletal: Negative for arthralgias, back pain, gait problem, joint swelling, myalgias, neck pain and neck stiffness.   Skin: Positive for pallor. Negative for rash and wound.   Neurological: Positive for weakness (generalized weakness). Negative for dizziness, tremors, seizures, syncope, light-headedness and headaches.   Hematological: Bruises/bleeds easily.   Psychiatric/Behavioral: Negative for confusion, hallucinations, self-injury, sleep disturbance and suicidal ideas. The patient is nervous/anxious (since death of her son)      Assessments:  · Environmental: single story home; good lighting; uncluttered; lives with spouse and son  · Functional Status: independent with all ADLs,   · Safety: someone present at all times; fall precautions; covid 19 virus precautions; social distancing   · Nutritional: 3 meals; snacks, protein shakes  · Home Health/DME/Supplies: cane, tub chair, toilet chair, walker  · No Home Health    Symptom Assessment (ESAS 0-10 scale)       ESAS 0 1 2 3 4 5 6 7 8 9 10   Pain X             Dyspnea x             Anxiety    x          Nausea X             Depression      x         Anorexia     X         Fatigue        x      Insomnia X             Restlessness  X             Agitation X               Constipation    no  Bowel Management Plan (BMP): no  Diarrhea        no  Comments: YANDEL 8/5/2020 normal    Performance Status:   PPS Score 60  Karnofsky Score:  60  EGOC:   2    History:  Past Medical History:   Diagnosis Date    Anticoagulant long-term use     Blood clot in vein 06/2016    Breast cancer 1994    Cataract     Hypertension     Lung cancer     Lung cancer metastatic to brain     Pancreatic cancer 1998    Posterior capsular opacification, left eye     Psychiatric problem     Seizures 01/25/2016    Stroke     Therapy      Family History   Problem Relation Age of Onset    Breast cancer Mother     Lung cancer Mother     Leukemia Paternal Grandfather     Stomach cancer Paternal Grandmother     Colon cancer Neg Hx     Ovarian cancer Neg Hx      Past Surgical History:   Procedure Laterality Date    BONE BIOSPY  3/9/16    BRAIN SURGERY  1/12/16    tumor removal 1/12/16    BREAST LUMPECTOMY  1998    left    CATARACT EXTRACTION Bilateral 2004    yag OU    CHOLECYSTECTOMY  1998    COLONOSCOPY N/A 11/13/2015    Procedure: COLONOSCOPY;  Surgeon: Alex Carmona MD;  Location: [facility] ENDO (4TH FLR);  Service: Endoscopy;  Laterality: N/A;  2 year f/u    COLONOSCOPY N/A 11/7/2018    Procedure: COLONOSCOPY;  Surgeon: Jim Raman MD;  Location: [facility] ENDO (4TH FLR);  Service: Endoscopy;  Laterality: N/A;  Eliquis - per Dr. George santiago to hold Eliquis x 2 days prior to colon- ERW    cysto and right ureteral stent  4/27/12    ecoli  2010    removal of blockage, done throat, then through the liver.    HYSTERECTOMY  1974    KIDNEY STONE SURGERY  2010--laser    left eswl  6/20/12    OOPHORECTOMY  4/25/2012    Laparoscopic BSO, lysis of adhesions, cystoscopy greater than 35mins     WHIPPLE PROCEDURE W/ LAPAROSCOPY  1998     Review of patient's allergies indicates:   Allergen Reactions    Tobradex [tobramycin-dexamethasone] Swelling    Iodinated contrast media Hives    Latex Rash and Hives    Phenytoin sodium extended Other (See Comments) and Rash       Medications:    Current Outpatient Medications:     albuterol-ipratropium (DUO-NEB) 2.5 mg-0.5 mg/3 mL  nebulizer solution, Take 3 mLs by nebulization every 6 (six) hours as needed for Wheezing or Shortness of Breath. Rescue, Disp: 1 Box, Rfl: 3    amitriptyline (ELAVIL) 50 MG tablet, Take 1 tablet (50 mg total) by mouth every evening., Disp: 90 tablet, Rfl: 12    calcium carbonate (OS-UNIQUE) 500 mg calcium (1,250 mg) tablet, Take 2 tablets (1,000 mg total) by mouth once daily., Disp: , Rfl: 0    clindamycin phosphate 1% (CLINDAGEL) 1 % gel, APPLY TOPICALLY TWICE DAILY, Disp: 60 g, Rfl: 6    CREON CpDR, TAKE ONE CAPSULE BY MOUTH THREE TIMES A DAY WITH MEALS, Disp: 270 capsule, Rfl: 3    denosumab (XGEVA) 120 mg/1.7 mL (70 mg/mL) Soln, Inject 120 mg into the skin every 28 days., Disp: , Rfl:     dronabinol (MARINOL) 5 MG capsule, Take 1 capsule (5 mg total) by mouth 2 (two) times daily before meals., Disp: 60 capsule, Rfl: 3    ELIQUIS 5 mg Tab, TAKE ONE TABLET BY MOUTH TWICE DAILY, Disp: 60 tablet, Rfl: 6    erlotinib (TARCEVA) 150 MG tablet, Take 1 tablet (150 mg total) by mouth once daily Hold medication until told to restart by Dr. Vazquez., Disp: 30 tablet, Rfl: 11    furosemide (LASIX) 40 MG tablet, Take 1 tablet (40 mg total) by mouth daily as needed (swelling)., Disp: 30 tablet, Rfl: 1    levETIRAcetam (KEPPRA) 500 MG Tab, TAKE ONE TABLET BY MOUTH TWICE DAILY, Disp: 180 tablet, Rfl: 3    LORazepam (ATIVAN) 1 MG tablet, Take 1 tablet (1 mg total) by mouth 2 (two) times daily., Disp: 60 tablet, Rfl: 5    losartan (COZAAR) 50 MG tablet, TAKE ONE TABLET BY MOUTH TWICE DAILY, Disp: 60 tablet, Rfl: 10    metoprolol succinate (TOPROL-XL) 50 MG 24 hr tablet, TAKE ONE TABLET BY MOUTH ONCE DAILY. hold UNTIL your appointment WITH your pcp. heart rate and blood pressure has been normal off OF this me, Disp: 30 tablet, Rfl: 11    multivitamin (THERAGRAN) per tablet, Take 1 tablet by mouth once daily., Disp: , Rfl:     MYRBETRIQ 50 mg Tb24, TAKE ONE TABLET BY MOUTH ONCE DAILY, Disp: 30 tablet, Rfl: 11     polyethylene glycol (GLYCOLAX) 17 gram/dose powder, , Disp: , Rfl:     senna-docusate 8.6-50 mg (SENNA LAXATIVE-STOOL SOFTENER) 8.6-50 mg per tablet, Take 1 tablet by mouth once daily., Disp: , Rfl:     24h Oral Morphine Equivalents (OME):  n/a    Objective:     Physical Exam:    There is no height or weight on file to calculate BMI.    Physical Exam   Unable to do physical exam>>audio visit done today    Labs:  CBC:   WBC   Date Value Ref Range Status   02/24/2020 7.54 3.90 - 12.70 K/uL Final      82 -  Hemoglobin   Date Value Ref Range Status   02/24/2020 9.9 (L) 12.0 - 16.0 g/dL Final   FT  POC Hematocrit   Date Value Ref Range Status   02/21/2020 30 (L) 36 - 54 %PCV Final     Hematocrit   Date Value Ref Range Status   02/24/2020 33.0 (L) 37.0 - 48.5 % Final   e Mean Corpuscular Volume   Date Value Ref Range Status   02/24/2020 86 82 - 98 fL Final   Value Ref Range Status   02/24/2020 3.2 (L) 3.5 - 5.2 g/dL Final     Protein:   Total Protein   Date Value Ref Range Status   02/24/2020 7.4 6.0 - 8.4 g/dL Final       Radiology:  I have reviewed all pertinent imaging results/findings within the past 24 hours.    Psychosocial/Cultural/Spiritual:  · F- [place] and Belief:  Zoroastrianism   · I - Importance: yes  · C - Community: [facility]  · A - Address in Care: Goes to [place] occasionally; no spiritual needs identified      Assessment:     1. Palliative care encounter  2. Counseling regarding Advance directives and goals of care  3. Fatigue  4. Generalized weakness  5. Depression  6. hoarseness      Plan:     Ethical / Legal: Advance Care Planning   · Surrogate decision maker:  Name: Javier St, Relationship:   · Code Status:  DNR  · [place]:  Left form at patient's house to review  · Other advance directive: no , left [facility] Advance Directives at patient's house  · Capacity to make medical decisions:  yes,   · Conflict: none         Total audio visit 43 mins  >50% of 43 min was spent  on counseling and coordination of care. The following issues were discussed: depression, grieving, hoarseness, generalized weakness    15 minutes of the 43mins of the audio visit was spent in advanced care planning. Discussed goals of care and reviewed [place]      Were controlled substances prescribed?  No    Assessment/Plan:    Palliative Care Encounter  -initiated palliative care  -Reviewed [place] and Ads   -DNR    Counseling regarding advance directives and goals of care  -Reviewed goals of care  -would like to become stronger and gain more strength  -wants to keep fighting      Hoarse  Claritin for post nasal drip    Generalized weakness  -fall precautions  -rest periods    Depression, unspecified  -continue Elavil   -grieving loss of son    Fatigue, unspecified  -rest periods      Follow Up Appointments:   Future Appointments   Date Time Provider Department Center   3/5/2020  1:00 PM JULIO UROLOGY [facility] CYSTO4 [facility]   3/18/2020  8:50 AM LAB, [facility] CANCER BLDG [facility]   3/18/2020 10:00 AM Devika Chappell PA-C [facility] HEM ONC [place]   3/18/2020 10:30 AM INJECTION, [facility] INFUSION [facility] [place] [facility]   3/19/2020  1:30 PM Nel Ramirez, J[facility] [address]   3/27/2020 10:00 AM PALLIATIVE AND SUPPORT CARE-HEMOC [facility] HEM ONC [place]   4/23/2020  9:30 AM [facility] PET CT LIMIT 500 LBS [facility] PET CT [facility]   4/30/2020  8:30 AM LAB, [facility] CANCER BLDG [facility] [place]   4/30/2020  9:40 AM Karrie Vazquez MD [facility] HEM ONC [place]   4/30/2020 10:00 AM INJECTION, [facility] INFUSION [facility] [place]     Patient and family agreed via verbal consent to access medical records and for assessment and treatment during this visit.       This service was not originating from a related E/M service provided within the previous 7 days nor will  to an E/M service or procedure within the next 24 hours or my soonest available appointment.  Prevailing standard of care was  able to be met in this audio-only visit.        Signature:  Kiya Landin DNP, FNP-C  [facility]

## 2020-08-07 ENCOUNTER — HOSPITAL ENCOUNTER (OUTPATIENT)
Dept: RADIOLOGY | Facility: HOSPITAL | Age: 75
Discharge: HOME | End: 2020-08-07
Attending: COLON & RECTAL SURGERY
Payer: MEDICARE

## 2020-08-07 DIAGNOSIS — K62.89 PROCTITIS: ICD-10-CM

## 2020-08-07 PROCEDURE — 72195 MRI DEFECOGRAPHY: ICD-10-PCS | Mod: 26,HCNC,, | Performed by: RADIOLOGY

## 2020-08-07 PROCEDURE — 72195 MRI PELVIS W/O DYE: CPT | Mod: TC,HCNC

## 2020-08-07 PROCEDURE — 72195 MRI PELVIS W/O DYE: CPT | Mod: 26,HCNC,, | Performed by: RADIOLOGY

## 2020-08-10 ENCOUNTER — TELEPHONE (OUTPATIENT)
Dept: HEMATOLOGY/ONCOLOGY | Facility: CLINIC | Age: 75
End: 2020-08-10

## 2020-08-10 DIAGNOSIS — C34.90 ADENOCARCINOMA OF LUNG, UNSPECIFIED LATERALITY: Primary | ICD-10-CM

## 2020-08-10 DIAGNOSIS — C34.90 ADENOCARCINOMA OF LUNG, UNSPECIFIED LATERALITY: ICD-10-CM

## 2020-08-10 NOTE — TELEPHONE ENCOUNTER
"----- Message from Surya Cheatham sent at 8/10/2020 11:10 AM CDT -----  Consult/Advisory:    Name Of Caller: Naty   Contact Preference?: [phone]    Provider Name: Dr Vazquez     What is the nature of the call?:  Pt states she think she needs iron states she is colder than she has ever been.  Please contact to discuss     Additional Notes:  "Thank you for all that you do for our patients'"           "

## 2020-08-10 NOTE — TELEPHONE ENCOUNTER
Spoke with patient.  She notes she is feeling more cold than usual as of lately.   She denies SOB, fatigue.  She states she has a history of iron deficiency.  She is requesting an iron panel to be drawn.   Her temp presently is 98.6.    Message routed to dr sullivan (may I bring her in for ferritin/iron-tibc?)

## 2020-08-11 ENCOUNTER — TELEPHONE (OUTPATIENT)
Dept: HEMATOLOGY/ONCOLOGY | Facility: CLINIC | Age: 75
End: 2020-08-11

## 2020-08-11 RX ORDER — ERLOTINIB HYDROCHLORIDE 150 MG/1
TABLET, FILM COATED ORAL
Qty: 30 TABLET | Refills: 3 | Status: SHIPPED | OUTPATIENT
Start: 2020-08-11 | End: 2020-08-17

## 2020-08-11 NOTE — TELEPHONE ENCOUNTER
"----- Message from Dorinda Giles sent at 8/11/2020  1:30 PM CDT -----  Patient Assist    Name of caller:  Naty   Provider name: George VALENTINO MD  Contact Preference:  [phone]  Current patient or new patient?: current   Does Patient feel the need to see the MD today? Unclear   What is the nature of the call?    - pt called to follow-up on the reported concern already discussed   with the staff. She was expecting to come in for infusion. Please call and assist.   Already advised of the 8/13 lab appt.    Additional Notes:   "Thank you for all that you do for our patients'"          "

## 2020-08-11 NOTE — TELEPHONE ENCOUNTER
----- Message from Renee Ortiz sent at 8/11/2020  8:50 AM CDT -----  Regarding: Appt time  Contact: pt  Reason:Calling to inform Nani she can come to appt on Thursday after 12:00    Communication: [phone]

## 2020-08-13 ENCOUNTER — LAB VISIT (OUTPATIENT)
Dept: LAB | Facility: HOSPITAL | Age: 75
End: 2020-08-13
Attending: INTERNAL MEDICINE
Payer: MEDICARE

## 2020-08-13 DIAGNOSIS — C34.90 ADENOCARCINOMA OF LUNG, UNSPECIFIED LATERALITY: ICD-10-CM

## 2020-08-13 LAB
FERRITIN SERPL-MCNC: 8 NG/ML (ref 20–300)
IRON SERPL-MCNC: 34 UG/DL (ref 30–160)
SATURATED IRON: 7 % (ref 20–50)
TOTAL IRON BINDING CAPACITY: 466 UG/DL (ref 250–450)
TRANSFERRIN SERPL-MCNC: 315 MG/DL (ref 200–375)

## 2020-08-13 PROCEDURE — 83540 ASSAY OF IRON: CPT | Mod: HCNC

## 2020-08-13 PROCEDURE — 36415 COLL VENOUS BLD VENIPUNCTURE: CPT | Mod: HCNC

## 2020-08-13 PROCEDURE — 82728 ASSAY OF FERRITIN: CPT | Mod: HCNC

## 2020-08-19 ENCOUNTER — PES CALL (OUTPATIENT)
Dept: ADMINISTRATIVE | Facility: CLINIC | Age: 75
End: 2020-08-19

## 2020-08-19 ENCOUNTER — TELEPHONE (OUTPATIENT)
Dept: PHARMACY | Facility: CLINIC | Age: 75
End: 2020-08-19

## 2020-08-20 NOTE — PATIENT INSTRUCTIONS
FOLLOW-UP INSTRUCTIONS:    1. Follow-up with palliative care NP in 4-6 weeks on 9/9/2020  2. Continue all medications, treatments, and therapies as ordered  3. Fall precautions at all times  4. Maintain Safety precautions at all times  5. Attend all medical appointments as scheduled  6. F/u with PCP as needed  7. Patient to have 6 feet between each other  8. In an Emergency, call 911 or go to ED; notify PCP's office  10. Limit Risks of environmental exposure to coronavirus as discussed including: social distancing, hand hygiene, and limiting departures from the home for necessities only.

## 2020-09-02 NOTE — TELEPHONE ENCOUNTER
----- Message from Francoise Boyce sent at 9/2/2020  3:09 PM CDT -----  Regarding: self  Type: Patient Call Back    Who called:self    What is the request in detail: please contact the patient to discuss her concerns    Can the clinic reply by [facility]? No     Would the patient rather a call back or a response via [facility]? call    Best call back number: [phone]

## 2020-09-02 NOTE — TELEPHONE ENCOUNTER
----- Message from Lisa Whitlock sent at 9/2/2020  2:55 PM CDT -----  Regarding: questions  Contact: [phone]  Pt is calling to speak with the nurse in the office. Please contact pt

## 2020-09-09 NOTE — PROGRESS NOTES
[facility]  Palliative Care Home Visit    Visit Date: 2020  Encounter Provider: Kiya Landin DNP, FNP-C  PCP:  Olvin Mars MD    Subjective:      Patient ID: Naty St is a 74 y.o. female.    Consult Requested By:  Shaka Ann M.D.  Reason for Consult:  Palliative Care      Chief Complaint:  Palliative Care Follow-up visit    Outpatient Palliative Care Encounter:    PMHx:    Ms. Naty St is a 75 y/o female with PMHx of Lung Cancer metastatic to brain, pancreatic cancer (), breast cancer (), hypertension, blood clot in vein (on anticoagulant long-term use), seizures, stroke, psychiatric problem,     PSHx:  Past surgical history includes brain surgery with tumor removal (2016), cholecystectomy, whipple (), oophorectomy (), hysterectomy (), and kidney stone surgery.     Social History:    Patient has been  to her  [place] for 31 years, and she has 4 adult children (not with current ). She has 2 siblings, 1 sister (lives in [place]) and 1 brother (). Patient resides at home with her  and son.    Impression:     Patient seen today @home for palliative care follow-up visit. Patient's , son Basil, and patient are all present. Patient is Awake, alert, and oriented x 3, forgetful at times. She reports feeling weaker than usual. Patient resting on sofa, and patient and son very interactive.  Reports good appetite and eating well. She states she is sleeping well at night. Reports a continued weight decline despite having a good appetite.     She denies any exposure to anyone with Covid 19 Virus. Denies fever, chills, SOB, sore throat. Has not had a bowel movement in 2 days, which is not her normal baseline. She takes Miralax as needed for constipation.    She is continuing with taking Tarceva, and follows-up with Dr. Vazquez every 3 months and as needed.   Patient talks about her family and her son who passed away not  "long ago. She states, "I miss him so much."       Note: Patient sees Dr. Vazquez every 3 months.     Goals of Care:    [place] form and [facility] Advance Care Directives form with patient.   Patient Reviewed completed [place] form. Would like to be DNR. [place] form completed by patient and family, has not been turned in.  Discussed hospice benefits but patient would like to continue with palliative care.     Discussed goals of care with patient. She would like to be able to drive again and get out the house more.  She also would like to live longer and stronger.      PLAN:  1. Palliative care to follow-up with patient in 4-6 weeks on 10/21/2020  2. Continue all medications  3. Patient to complete advance directives for (POA)  4. Offer supportive to care to the patient and family  5. In emergency, call 911 or go to ED; notify PCP's office  6. Encourage rest periods  7. Follow-up with Dr. Vazquez as needed  8. Rx for lac hydrin apply to arms and legs as needed>>sent to pharmacy of pt's choice    Review of Systems   Constitutional: Negative for activity change, improved appetite change, chills, fatigue and fever.   HENT: Positive for rhinorrhea. Negative for congestion, postnasal drip, sinus pressure, sinus pain, sneezing, sore throat and trouble swallowing.  +hoarseness   Eyes: Positive for visual disturbance (glasses).   Respiratory: Positive for cough (occasional NP cough) and denies shortness of breath. Negative for choking, chest tightness and wheezing.    Cardiovascular: Negative for chest pain, palpitations and leg swelling.   Gastrointestinal: +constipation, Negative for abdominal distention, abdominal pain, blood in stool, constipation, diarrhea, nausea and vomiting. +prolapsed rectum at times  Endocrine: Negative for polyuria.   Genitourinary: Negative for difficulty urinating and hematuria.   Musculoskeletal: Negative for arthralgias, back pain, gait problem, joint swelling, myalgias, neck pain and neck stiffness. "   Skin: Negative for rash, change in skin color and wound.   Neurological: Positive for weakness (generalized weakness). Negative for dizziness, tremors, seizures, syncope, light-headedness and headaches.   Hematological: Bruises/bleeds easily.   Psychiatric/Behavioral: Negative for confusion, hallucinations, self-injury, sleep disturbance and suicidal ideas. The patient is nervous/anxious (since death of her son)      Assessments:  · Environmental: single story home; good lighting; clean, uncluttered; lives with spouse and son  · Functional Status: independent with all ADLs,   · Safety: someone present at all times; fall precautions; covid 19 virus precautions; social distancing   · Nutritional: 3 meals; snacks, protein shakes  · Home Health/DME/Supplies: cane, tub chair, toilet chair, walker  · No Home Health    Symptom Assessment (ESAS 0-10 scale)       ESAS 0 1 2 3 4 5 6 7 8 9 10   Pain X             Dyspnea x             Anxiety    x          Nausea X             Depression      x         Anorexia     x         Fatigue        x      Insomnia X             Restlessness       x        Agitation      x          Constipation  yes  Bowel Management Plan (BMP): miralax  Diarrhea no  Comments: LBM 9/7/2020 normal    Performance Status:   PPS Score 60  Karnofsky Score:  60  EGOC:  2    History:  Past Medical History:   Diagnosis Date    Anticoagulant long-term use     Blood clot in vein 06/2016    Breast cancer 1994    Cataract     Hypertension     Lung cancer     Lung cancer metastatic to brain     Pancreatic cancer 1998    Posterior capsular opacification, left eye     Psychiatric problem     Seizures 01/25/2016    Stroke     Therapy      Family History   Problem Relation Age of Onset    Breast cancer Mother     Lung cancer Mother     Leukemia Paternal Grandfather     Stomach cancer Paternal Grandmother     Colon cancer Neg Hx     Ovarian cancer Neg Hx      Past Surgical History:   Procedure  Laterality Date    BONE BIOSPY  3/9/16    BRAIN SURGERY  1/12/16    tumor removal 1/12/16    BREAST LUMPECTOMY  1998    left    CATARACT EXTRACTION Bilateral 2004    yag OU    CHOLECYSTECTOMY  1998    COLONOSCOPY N/A 11/13/2015    Procedure: COLONOSCOPY;  Surgeon: Alex Carmona MD;  Location: [facility] (4TH FLR);  Service: Endoscopy;  Laterality: N/A;  2 year f/u    COLONOSCOPY N/A 11/7/2018    Procedure: COLONOSCOPY;  Surgeon: Jim Raman MD;  Location: [facility] (4TH FLR);  Service: Endoscopy;  Laterality: N/A;  Eliquis - per Dr. Vazquez -ok to hold Eliquis x 2 days prior to colon- ERW    cysto and right ureteral stent  4/27/12    ecoli  2010    removal of blockage, done throat, then through the liver.    HYSTERECTOMY  1974    KIDNEY STONE SURGERY  2010--laser    left eswl  6/20/12    OOPHORECTOMY  4/25/2012    Laparoscopic BSO, lysis of adhesions, cystoscopy greater than 35mins     WHIPPLE PROCEDURE W/ LAPAROSCOPY  1998     Review of patient's allergies indicates:   Allergen Reactions    Tobradex [tobramycin-dexamethasone] Swelling    Iodinated contrast media Hives    Latex Rash and Hives    Phenytoin sodium extended Other (See Comments) and Rash       Medications:    Current Outpatient Medications:     albuterol-ipratropium (DUO-NEB) 2.5 mg-0.5 mg/3 mL nebulizer solution, Take 3 mLs by nebulization every 6 (six) hours as needed for Wheezing or Shortness of Breath. Rescue, Disp: 1 Box, Rfl: 3    amitriptyline (ELAVIL) 50 MG tablet, Take 1 tablet (50 mg total) by mouth every evening., Disp: 90 tablet, Rfl: 12    calcium carbonate (OS-UNIQUE) 500 mg calcium (1,250 mg) tablet, Take 2 tablets (1,000 mg total) by mouth once daily., Disp: , Rfl: 0    clindamycin phosphate 1% (CLINDAGEL) 1 % gel, APPLY TOPICALLY TWICE DAILY, Disp: 60 g, Rfl: 6    CREON CpDR, TAKE ONE CAPSULE BY MOUTH THREE TIMES A DAY WITH MEALS, Disp: 270 capsule, Rfl: 3    denosumab (XGEVA) 120 mg/1.7 mL (70 mg/mL) Soln,  Inject 120 mg into the skin every 28 days., Disp: , Rfl:     dronabinol (MARINOL) 5 MG capsule, Take 1 capsule (5 mg total) by mouth 2 (two) times daily before meals., Disp: 60 capsule, Rfl: 3    ELIQUIS 5 mg Tab, TAKE ONE TABLET BY MOUTH TWICE DAILY, Disp: 60 tablet, Rfl: 6    erlotinib (TARCEVA) 150 MG tablet, Take 1 tablet (150 mg total) by mouth once daily Hold medication until told to restart by Dr. Vazquez., Disp: 30 tablet, Rfl: 11    furosemide (LASIX) 40 MG tablet, Take 1 tablet (40 mg total) by mouth daily as needed (swelling)., Disp: 30 tablet, Rfl: 1    levETIRAcetam (KEPPRA) 500 MG Tab, TAKE ONE TABLET BY MOUTH TWICE DAILY, Disp: 180 tablet, Rfl: 3    LORazepam (ATIVAN) 1 MG tablet, Take 1 tablet (1 mg total) by mouth 2 (two) times daily., Disp: 60 tablet, Rfl: 5    losartan (COZAAR) 50 MG tablet, TAKE ONE TABLET BY MOUTH TWICE DAILY, Disp: 60 tablet, Rfl: 10    metoprolol succinate (TOPROL-XL) 50 MG 24 hr tablet, TAKE ONE TABLET BY MOUTH ONCE DAILY. hold UNTIL your appointment WITH your pcp. heart rate and blood pressure has been normal off OF this me, Disp: 30 tablet, Rfl: 11    multivitamin (THERAGRAN) per tablet, Take 1 tablet by mouth once daily., Disp: , Rfl:     MYRBETRIQ 50 mg Tb24, TAKE ONE TABLET BY MOUTH ONCE DAILY, Disp: 30 tablet, Rfl: 11    polyethylene glycol (GLYCOLAX) 17 gram/dose powder, , Disp: , Rfl:     senna-docusate 8.6-50 mg (SENNA LAXATIVE-STOOL SOFTENER) 8.6-50 mg per tablet, Take 1 tablet by mouth once daily., Disp: , Rfl:     24h Oral Morphine Equivalents (OME):  n/a    Objective:     Physical Exam:    There is no height or weight on file to calculate BMI.    Physical Exam:     Constitutional: She is oriented to person, place, and time. She appears well-developed and well-nourished. No distress.   Sitting on sofa in living room. Awake, alert, and oriented x 3; forgetful; appears ill.  HENT:   Head: Normocephalic and atraumatic.   Eyes: Conjunctivae and EOM are  normal. No scleral icterus.   Neck: Normal range of motion. Neck supple. No JVD present. No thyromegaly present.   Cardiovascular: Normal rate, regular rhythm, normal heart sounds and intact distal pulses. Exam reveals no gallop. +murmur grade II/VI  Pulmonary/Chest: Effort normal. No respiratory distress. She has no rales. She exhibits no tenderness.   SOB with exertion ; BBS CTA  Abdominal: Soft. Bowel sounds are normal. She exhibits no distension. There is no tenderness. No hernia.   Musculoskeletal: Normal range of motion. She exhibits no edema or tenderness.   Neurological: She is alert and oriented to person, place, and time.  Skin: Skin is warm and dry. Capillary refill takes less than 2 seconds. No rash noted. No erythema. No pallor.   Psychiatric: Appears sad and depressed. Her behavior is normal. Judgment and thought content normal.   Nursing note and vitals reviewed.      Labs:  CBC:   WBC   Date Value Ref Range Status   02/24/2020 7.54 3.90 - 12.70 K/uL Final      82 -  Hemoglobin   Date Value Ref Range Status   02/24/2020 9.9 (L) 12.0 - 16.0 g/dL Final   FT  POC Hematocrit   Date Value Ref Range Status   02/21/2020 30 (L) 36 - 54 %PCV Final     Hematocrit   Date Value Ref Range Status   02/24/2020 33.0 (L) 37.0 - 48.5 % Final   e Mean Corpuscular Volume   Date Value Ref Range Status   02/24/2020 86 82 - 98 fL Final   Value Ref Range Status   02/24/2020 3.2 (L) 3.5 - 5.2 g/dL Final     Protein:   Total Protein   Date Value Ref Range Status   02/24/2020 7.4 6.0 - 8.4 g/dL Final       Radiology:  I have reviewed all pertinent imaging results/findings within the past 24 hours.    Psychosocial/Cultural/Spiritual:  · F- Amair[place] and Belief:  Caodaism   · I - Importance: yes  · C - Community: [employer] [employer] [facility] [place]  · A - Address in Care: Goes to [place] occasionally; no spiritual needs identified      Assessment:     1. Palliative care encounter  2. Counseling regarding Advance directives and goals  of care  3. Fatigue, unspecified type  4. Generalized weakness  5. Depression, unspecified type  6. Frailty   7. Grieving     Plan:     Ethical / Legal: Advance Care Planning   · Surrogate decision maker:  Name: Javier St, Relationship:   · Code Status:  DNR  · [place]:  Left form at patient's house to review  · Other advance directive: no , left [facility] Advance Directives at patient's house  · Capacity to make medical decisions:  yes,   · Conflict: none     Total face-to-face time was 90min  >50% of 60 min was spent on counseling and coordination of care. The following issues were discussed: discussed pain, fatigue, weakness, chemo and treatment, depression, grieving      An additional 30 minutes of the visit was spent in advanced care planning. Discussed goals of care with patient.       Were controlled substances prescribed?  No    Assessment/Plan:    Palliative Care Encounter  -initiated palliative care  -Reviewed [place] and Ads  -[place]POST Completed 5/2020   -DNR    Counseling regarding advance directives and goals of care  -Reviewed goals of care  -would like to become stronger and gain more strength  -wants to keep fighting    Generalized weakness  -fall precautions  -rest periods    Depression, unspecified  -continue Elavil   -grieving loss of son    Fatigue, unspecified  -rest periods    Grieving  -loss son several months ago (he lived in [place])  -continue Elavil  -offer referral to psych as needed    Follow Up Appointments:   Future Appointments   Date Time Provider Department Center   3/5/2020  1:00 PM JULIO UROLOGY [facility]   3/18/2020  8:50 AM LAB, HEMONC CANCER BLDG [facility]   3/18/2020 10:00 AM Devika Chappell PA-C [facility] HEM ONC [facility]   3/18/2020 10:30 AM INJECTION, [facility] INFUSION [facility]   3/19/2020  1:30 PM Nel Ramirez NP [address] [place]   3/27/2020 10:00 AM PALLIATIVE AND SUPPORT CARE-HEMOC [facility] HEM ONC [facility]    4/23/2020  9:30 AM [facility] PET CT LIMIT 500 LBS [facility] PET CT [facility]   4/30/2020  8:30 AM LAB, HEMONC CANCER BLDG [facility] LAB [facility]   4/30/2020  9:40 AM Karrie Vazquez MD [facility] HEM ONC [facility]   4/30/2020 10:00 AM INJECTION, [facility] INFUSION [facility] CHEMO [facility]     Patient and family agreed via verbal consent to access medical records and for assessment and treatment during this visit.    NP wore face mask throughout entire length of visit    Attestation: Screening criteria to assess the level of the patient's risk for infection with COVID-19 as recommended by the CDC at the time of the above documented home visit concluded appropriateness to proceed.     Universal precautions were maintained at all times, including provider use of >60% alcohol gel hand  immediately prior to entry and upon departing the patient's home as well as cleaning of equipment used in home visit with antibacterial/germicidal disposable wipes.    Patient has not been exposed to anyone with the virus (to the patient's knowledge)  Patient has not been out of the country in the last 14 days.           Signature:  Kiya Landin, PADMINI, FNP-C  [facility]  [phone] cell

## 2020-09-09 NOTE — TELEPHONE ENCOUNTER
"----- Message from Surya Cheatham sent at 9/9/2020  9:22 AM CDT -----  Reschedule Existing Appointment    Appt Date: 08/26/20  Type of appt : NON FASTING LAB [[id number]]  Physician: Dr Vazquez   Reason for rescheduling?  Please contact patient to reschedule missed lab  Caller: Naty   Contact Preference: [phone]    Additional Information:  "Thank you for all that you do for our patients'"            "

## 2020-09-09 NOTE — TELEPHONE ENCOUNTER
----- Message from Jermain Cazares sent at 9/9/2020  9:21 AM CDT -----  Contact: pt  Pt is calling to schedule appt with Dr. Carmona, first available isn't until Jan 2021 would like to be seen sooner         Please contact pt [phone]

## 2020-09-09 NOTE — PATIENT INSTRUCTIONS
FOLLOW-UP INSTRUCTIONS:    1. Follow-up with palliative care NP in 4-6 weeks on  10/21/2020  2. Continue all medications, treatments, and therapies as ordered  3. Fall precautions at all times  4. Maintain Safety precautions at all times  5. Attend all medical appointments as scheduled  6. F/u with PCP as needed  7. Patient to have 6 feet between each other  8. In an Emergency, call 911 or go to ED; notify PCP's office  9.  Rx sent to pharmacy of choice for Lac hydrin apply tid and prn to BUE and BLE for dry itchy skin  10. Limit Risks of environmental exposure to coronavirus as discussed including: social distancing, hand hygiene, and limiting departures from the home for necessities only.

## 2020-10-15 PROBLEM — N17.9 AKI (ACUTE KIDNEY INJURY): Status: ACTIVE | Noted: 2020-01-01

## 2020-10-15 NOTE — H&P
[facility]  Hematology/Oncology  H&P    Patient Name: Naty St  MRN: [id number]  Admission Date: 10/15/2020  Code Status: DNR   Attending Provider: Luz Marina Huerta MD  Primary Care Physician: Olvin Mars MD  Principal Problem:<principal problem not specified>    Subjective:     HPI: Mrs. St, 75 years old female patient with history of HTN, DVT, metastatic R lung adenocarcinoma on on Tarceva, breast ca s/p L lumpectomy, meningioma s/p resection, pancreatic ca s/p whipple, and s/p hysterectomy w/ b/l salpingo-oophorectomy, HTN who presented to ER with abnormal lab concerning of elevated creatinine.   She said she has been feeling more fatigued, and tired than usual in the last week. She has noted episodes of mild dizziness. Her oral hydration has decreased as per her son, but still maintains fair appetite. She had routine labs today which showed creatinine of 4.8 (baseline 2.0). She was instructed by Dr. Vazquez to go to ER.   She denies fever, chills, headaches, worsening cough, abdominal pain, or dysuria. She denies diarrhea. She has 1x NBNB vomiting during last week.   In the ER, she received 1L of LR. UA negative for infection. She is placed in observation for STEFAN     Oncology history (from Dr. Vazquez's notes)   Ms. Naty St was admitted to the hospital between 01/25/2016 and 01/28/2016. She has a history of pancreatic cancer 17 years ago, breast cancer and meningioma, presented to the hospital complaining of loss of consciousness. The patient apparently was in her normal state of health and she stood up to go to the bathroom and fell to the floor. The patient's daughter helped the mother up in the bathroom and noted that her mother's upper extremities were shaking and eye rolling. No reports of bowel or bladder incontinence, tongue biting or rolling. The second episode lasted about four minutes and she was extremely lethargic following that. Apparently, the patient had  "a meningioma resection done in the past; however, recently, underwent neurosurgical resection with Dr. 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    Additional immunohistochemical stains were performed which revealed the tumor cells to be are positive for TTF1 and negative for ER and GCDFP. The morphology and TTF1 positivity are most consistent with lung primary.    Of note, imaging scan at the end of January 2016 revealed a mass in the lung at 2 cm in the medial aspect of the apical segment of the right upper lobe abutting the mediastinum at the level of the azygous vein and abutting and possibly encasing the segmental bronchi and vessels of the apical segment of the right upper lobe and no pleural fluid was present. Also, there is an enlarged right paratracheal lymph node. No evidence of any metastatic disease at the pancreatic site with postoperative changes post Whipple disease  Her PET Scan from 2/15/16 reveal "Hypermetabolic mass in the right lung apex consistent with a primary malignancy. Hypermetabolic mediastinal lymph nodes consistent with metastatic disease. Right sacral hypermetabolic lesion consistent with metastatic disease, noting additional mildly sclerotic lesions in multiple vertebral bodies which do not demonstrate abnormal hypermetabolism  She underwent IR bone biopsy which revealed metastatic adenocarcinoma of lung origin. 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  She has completed focal RT to brain lesions in March 2016.    PET scan from 6/6/16 shows "Dramatic almost complete response to therapy."  Lovenox started after US lower ext 6/7/16 shows Acute complete occlusion of one of the left posterior tibial vein.Remote partial thrombus of the proximal left superficial femoral vein.  She is on Tarceva.   12/6/16 PET scan reveals "Stable right upper lobe lesion and right hilar lymph " "node. No new lesions identified. Trace left pleural effusion".  1/27/17 Renal u/s "Medical renal disease. Nonobstructive right nephrolithiasis"  1/31/17 PET - "Right upper lobe nodule and right hilar lymph node similar and very low grade activity.  There is no definite evidence of recurrence."   11/9/17 PET "In this patient with history of lung cancer, there is interval increase in size and hypermetabolism of a right upper lobe lung lesion concerning for recurrent disease. Additionally, there is interval appearance of a hypermetabolic paratracheal lymph node suspicious for metastatic disease"     She progressed on Tarceva She underwent EBUS on 12/5/17. Pathology revealed adenocarcinoma but T790m could not be done as quantity was not insufficient. Her PET scan from 1/30/18 revealed The patient's previously identified abnormal lesions is slightly greater uptake of FDG on today's study compared with prior exam. These findings are concerning for progression of disease"      She was hospitalized between 4/9/18-4/16/18. Her hospital course was as follows "Patient was admitted to hemonc service on 4/9 with acute hypoxic respiratory failure. She was requiring 4 L of nc on admission. She was started on moxi for CAP. She received one dose of ceftriaxone in the ED. On 2nd day of admission she spiked a fever. Her Hb was ~7 g/dl so she was transfused 1 unit of PRBCs. Over night she developed worsening of her symptoms with increased O2 requirements to 15 L HFNC. Her CXR showed worsening congestion. There was a concern of TRALI vs TACO. New 2D echo with diastolic dysfunction. She was given IV lasix pushes as needed and was started on dexamethasone.  Her abx was escalated to vanc and cefepime. She improved with diuresis and steroids. Pulmonary were consulted. Her O2 requirements continued to improve. On 4/15 she was switched to Augmentin. PT recommended discharging her to SNF but the patient refused and she wanted to go home with " "home health. She qualified for home oxygen so she was discharged on 3 L nc while at rest and 6 while active. Augmentin and dexamethasone were discontinued on discharge"     She was readmitted from 4/27 to 5/3/18 with who presented to the ED with a complaint of fatigue and worsening DELA CRUZ. Pt diagnosed PNA 4/9 and was discharged on 4/16 on 3L oxygen. She was initially placed on cefepime for 3 days followed by an add'l 2 days of Augmentin. Pulm was consulted with assistance as her oxygen requirements were increasing. She was placed on oral steroids, then trailed on 80mg TID solumedrol for 2 days and will be discharged on a prednisone taper. She was also diuresed with 2 days of 40mg IV lasix with appropriate UOP resulting, improvement on repeat CXR. She was medically stable and appropriate for DC home with home health on 1L continuous O2. She will be seen for close f/u and may be able to come off oxygen at that time.      She discontinued Tagrisso in April 2018. Restaging scans from 6/4/18 revealed "Stable appearance of a spiculated right upper lobe pulmonary lesion.  Additional irregular focus superior to the lesion in the right lung apex is stable and may represent scar or additional lesion; although this was not hypermetabolic on prior PET-CT.  No new pulmonary lesions. 1.3 cm subcarinal lymph node, not hypermetabolic on prior PET-CT. Stable postsurgical changes of a Whipple procedure. Bilateral nonobstructing nephrolithiasis.  Stable sclerotic focus in the T5 vertebral body, possibly a bone island as this was not hypermetabolic on prior PET-CT. Small hiatal hernia. RECIST SUMMARY: Date of prior examination for comparison 01/30/2018 Lesion 1: Right upper lobe 1.3 cm Series 2 image 31 prior measurement 1.3 cm"  Bone scan also from 6/4/18 revealed no evidence of mets.     Her PET scan from 7/11/18 revealed "Right suprahilar lesion SUV max 3.76, previously 6.86. Pretracheal lymph node SUV max 2.55, previously 3.79. There " "is physiologic intracranial, head, and neck activity.  There is muscle activation.  There is vocal cord activation.  There is physiologic liver, spleen, GI and  activity.  There is a hiatal hernia.  Pelvic organs show nothing unusual.  No bone lesions are seen.  There are areas of benign appearing lung scar"  She notes fatigue.  She denies any nausea, vomiting, diarrhea, constipation, abdominal pain, weight loss or loss of appetite, chest pain, shortness of breath, leg swelling, fatigue, pain, headache, dizziness, or mood changes. Her ECOG PS is 2. She is accompanied by her  and son     She has been on Tarceva since 6/5/18. She has now completed SBRT to her right lung lesions.             No new subjective & objective note has been filed under this hospital service since the last note was generated.    Assessment/Plan:     STEFAN (acute kidney injury)  STEFAN on CKD, baseline creatinine 2.0   Likely pre-renal in the setting of poor po intake, and BUN:Cr ratio > 20:1   She received 1L LR.   - Obtain US retroperitoneal to r/o obstruction   - send for urine electrolytes   - continue with gentle hydration with NS @ 84 ml / hour .   - If no improvement by tomorrow, consider nephrology evaluation   - Continue calcium carbonate      Seizure disorder  Continue keppra 500 mg BID    Anticoagulant long-term use  Continue eliquis 5 mg bid for hx of DVT      Malignant neoplasm of lower lobe of right lung  On  Erlotinib    Follow-up on discharge with Dr. Vazquez    Essential hypertension  Hold losartan in the setting of STEFAN   Switch to amlodipine 5 mg daily        Everardo Rios MD  Hematology/Oncology  [facility]      "

## 2020-10-15 NOTE — HPI
Mrs. St, 75 years old female patient with history of HTN, DVT, metastatic R lung adenocarcinoma on on Tarceva, breast ca s/p L lumpectomy, meningioma s/p resection, pancreatic ca s/p whipple, and s/p hysterectomy w/ b/l salpingo-oophorectomy, HTN who presented to ER with abnormal lab concerning of elevated creatinine.     She reports feeling more fatigued, and tired than usual in the last week. She has noted episodes of mild dizziness. Her oral hydration has decreased as per her son, but still maintains fair appetite. She had routine labs today which showed creatinine of 4.8 (baseline 2.0). She was instructed by Dr. Vazquez to go to ER. She denies fever, chills, headaches, worsening cough, abdominal pain, or dysuria. She denies diarrhea. She has 1x NBNB vomiting during last week. In the ER, she received 1L of LR. UA negative for infection. She is placed in observation for STEFAN     Oncology history (from Dr. Vazquez's notes)   Past history of pancreatic cancer, breast cancer and meningioma.     2016  -- 01/12/2016: meningioma resection done in the past; however, recently, underwent neurosurgical resection with Dr. Ferrera. Pathology from that revealed malignant neoplasm with multiple features pointing towards metastatic papillary serous adenocarcinoma. Additional immunohistochemical stains were performed which revealed the tumor cells to be are positive for TTF1 and negative for ER and GCDFP. The morphology and TTF1 positivity are most consistent with lung primary.    -- Late 01/2016: imaging revealed a mass in the lung at 2 cm in the medial aspect of the apical segment of the right upper lobe abutting the mediastinum at the level of the azygous vein and abutting and possibly encasing the segmental bronchi and vessels of the apical segment of the right upper lobe and no pleural fluid was present. Also, there is an enlarged right paratracheal lymph node. No evidence of any metastatic disease at the pancreatic site with  "postoperative changes post Whipple disease  -- 2/15/16: PET scan reveal "Hypermetabolic mass in the right lung apex consistent with a primary malignancy. Hypermetabolic mediastinal lymph nodes consistent with metastatic disease. Right sacral hypermetabolic lesion consistent with metastatic disease, noting additional mildly sclerotic lesions in multiple vertebral bodies which do not demonstrate abnormal hypermetabolism. She underwent IR bone biopsy which revealed metastatic adenocarcinoma of lung origin. 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  -- March 2016: She has completed focal RT to brain lesions  -- 6/6/16: PET scan shows "Dramatic almost complete response to therapy."  -- 6/7/16: DVT on US. Lovenox started. She is on Tarceva.   -- 12/6/16: PET scan reveals "Stable right upper lobe lesion and right hilar lymph node. No new lesions identified. Trace left pleural effusion".    2017  -- 1/31/17: PET - "Right upper lobe nodule and right hilar lymph node similar and very low grade activity.  There is no definite evidence of recurrence."   -- 11/9/17: PET "In this patient with history of lung cancer, there is interval increase in size and hypermetabolism of a right upper lobe lung lesion concerning for recurrent disease. Additionally, there is interval appearance of a hypermetabolic paratracheal lymph node suspicious for metastatic disease"   -- 12/5/2017: She progressed on Tarceva She underwent EBUS. Pathology revealed adenocarcinoma but T790m could not be done as quantity was not insufficient.     2018  -- 1/30/18: PET scan revealed The patient's previously identified abnormal lesions is slightly greater uptake of FDG on today's study compared with prior exam. These findings are concerning for progression of disease"  -- 04/ 2018: She discontinued Tagrisso  -- 6/4/18: restaging scans revealed "Stable appearance of a spiculated right upper lobe pulmonary lesion.  Additional irregular focus superior to the lesion in " "the right lung apex is stable and may represent scar or additional lesion; although this was not hypermetabolic on prior PET-CT.  No new pulmonary lesions. 1.3 cm subcarinal lymph node, not hypermetabolic on prior PET-CT. Stable postsurgical changes of a Whipple procedure. Bilateral nonobstructing nephrolithiasis.  Stable sclerotic focus in the T5 vertebral body, possibly a bone island as this was not hypermetabolic on prior PET-CT. Small hiatal hernia. RECIST SUMMARY: Date of prior examination for comparison 01/30/2018 Lesion 1: Right upper lobe 1.3 cm Series 2 image 31 prior measurement 1.3 cm"  -- 6/4/18: Bone scan revealed no evidence of mets.   -- 7/11/18: PET scan revealed "Right suprahilar lesion SUV max 3.76, previously 6.86. Pretracheal lymph node SUV max 2.55, previously 3.79. There is physiologic intracranial, head, and neck activity.  There is muscle activation.  There is vocal cord activation.  There is physiologic liver, spleen, GI and  activity.  There is a hiatal hernia.  Pelvic organs show nothing unusual.  No bone lesions are seen.  There are areas of benign appearing lung scar"    She has been on Tarceva since 6/5/18. She has now completed SBRT to her right lung lesions.          "

## 2020-10-15 NOTE — ED PROVIDER NOTES
Encounter Date: 10/15/2020       History     Chief Complaint   Patient presents with    Abnormal Lab     elevated kidney function, last chemo today for lung cancer     Carly St is a 75-year-old female history of adenocarcinoma lung, hypertension, seizure,  presenting today with abnormal labs.  Patient had routine labs today, found to have creatinine of 4.8, baseline 1.9.  She does report decreased p.o. intake but denies vomiting or diarrhea.  She states she is going to the bathroom frequently with only small amount of output each time.  Reports no abdominal pain, fever or chills.  States she feels slightly weak but otherwise no acute issues        Review of patient's allergies indicates:   Allergen Reactions    Tobradex [tobramycin-dexamethasone] Swelling    Iodinated contrast media Hives    Latex Rash and Hives    Phenytoin sodium extended Other (See Comments) and Rash     Past Medical History:   Diagnosis Date    Anticoagulant long-term use     Blood clot in vein 06/2016    Breast cancer 1994    Cataract     Hypertension     Lung cancer     Lung cancer metastatic to brain     Pancreatic cancer 1998    Posterior capsular opacification, left eye     Psychiatric problem     Seizures 01/25/2016    Stroke     Therapy      Past Surgical History:   Procedure Laterality Date    BONE BIOSPY  3/9/16    BRAIN SURGERY  1/12/16    tumor removal 1/12/16    BREAST LUMPECTOMY  1998    left    CATARACT EXTRACTION Bilateral 2004    yag OU    CHOLECYSTECTOMY  1998    COLONOSCOPY N/A 11/13/2015    Procedure: COLONOSCOPY;  Surgeon: Alex Carmona MD;  Location: [address]);  Service: Endoscopy;  Laterality: N/A;  2 year f/u    COLONOSCOPY N/A 11/7/2018    Procedure: COLONOSCOPY;  Surgeon: Jim Raman MD;  Location: [address]);  Service: Endoscopy;  Laterality: N/A;  Eliquis - per Dr. Vazquez -ok to hold Eliquis x 2 days prior to colon- ERW    COLONOSCOPY N/A 6/2/2020    Procedure:  COLONOSCOPY;  Surgeon: Darius Medina MD;  [facility] ([address] [place] [address]);  Service: Endoscopy;  Laterality: N/A;  Schedule tomorrow  patient rescheduled due to poor bowel prep-BB  rapid covid test completed on 20 per ROQUE Zabala, email sent to Endo Staff-BB  updated instructions emailed to patient-BB  Latex allergy  approval to hold Eliquis received, see telephone encounter 20-BB      cysto and right ureteral stent  12    ecoli      removal of blockage, done throat, then through the liver.    ESOPHAGOGASTRODUODENOSCOPY N/A 2020    Procedure: EGD (ESOPHAGOGASTRODUODENOSCOPY);  Surgeon: Darius Medina MD;  [facility] ([address] [place] [address]);  Service: Endoscopy;  Laterality: N/A;  Schedule tomorrow  patient rescheduled due to poor bowel prep-BB  rapid covid test completed on 20 per ROQUE Zabala, email sent to Endo Staff-BB  updated instructions emailed to patient-BB  Latex allergy  approval to hold Eliquis received, see telephone     HYSTERECTOMY  1974    KIDNEY STONE SURGERY  --laser    left eswl  12    OOPHORECTOMY  2012    Laparoscopic BSO, lysis of adhesions, cystoscopy greater than 35mins     WHIPPLE PROCEDURE W/ LAPAROSCOPY       Family History   Problem Relation Age of Onset    Breast cancer Mother     Lung cancer Mother     Leukemia Paternal Grandfather     Stomach cancer Paternal Grandmother     Colon cancer Neg Hx     Ovarian cancer Neg Hx      Social History     Tobacco Use    Smoking status: Former Smoker     Packs/day: 0.00     Years: 0.00     Pack years: 0.00     Quit date: 1961     Years since quittin.0    Smokeless tobacco: Never Used   Substance Use Topics    Alcohol use: No    Drug use: No     Review of Systems   Constitutional: Negative for chills and fever.   HENT: Negative for congestion and rhinorrhea.    Respiratory: Negative for cough and shortness of breath.    Cardiovascular: Negative for chest pain and palpitations.    Gastrointestinal: Negative for diarrhea, nausea and vomiting.        Decreased PO intake     Genitourinary: Positive for decreased urine volume and difficulty urinating. Negative for dysuria.   Musculoskeletal: Negative for back pain and myalgias.   Skin: Negative for pallor.       Physical Exam     Initial Vitals [10/15/20 1506]   BP Pulse Resp Temp SpO2   124/62 64 18 97.8 °F (36.6 °C) 100 %      MAP       --         Physical Exam    Nursing note and vitals reviewed.  Constitutional: She appears well-developed.   Chronically ill-appearing but no acute distress   HENT:   Mouth/Throat: Oropharynx is clear and moist.   Eyes: Conjunctivae are normal. No scleral icterus.   Cardiovascular: Normal rate and normal heart sounds.   Pulmonary/Chest: Breath sounds normal. She has no wheezes. She has no rales.   Abdominal: Soft. Bowel sounds are normal. There is no abdominal tenderness.   (+) CVA tenderness     Musculoskeletal: No edema.   Neurological: She is alert and oriented to person, place, and time. She has normal strength.   Skin: Skin is warm. Capillary refill takes less than 2 seconds. No rash noted.         ED Course   Procedures  Labs Reviewed   URINALYSIS, REFLEX TO URINE CULTURE    Narrative:     Specimen Source->Urine   POTASSIUM, URINE, RANDOM   OSMOLALITY, URINE RANDOM   SODIUM, URINE, RANDOM   UREA NITROGEN, URINE, RANDOM   CREATININE, URINE, RANDOM   OSMOLALITY, URINE RANDOM    Narrative:     Specimen Source->Urine  ADD ON UROSM [id number], UKR [id number], UNAR [id number], UCRER   [id number], & UUNR [id number] PER DR. GARCIA.  10/15/2020  18:09    SARS-COV-2 RDRP GENE   ISTAT CHEM8          Imaging Results    None          Medical Decision Making:   History:   Old Records Summarized: records from previous admission(s).       <> Summary of Records: 4/30/18:  CONCLUSIONS     1 - Low normal to mildly depressed left ventricular systolic function (EF 50-55%) with RCA territory wall motion abnormalities (not seen  on the 4/9/18 echocardiogram).     2 - Normal right ventricular systolic function .     3 - Indeterminate LV diastolic function.     4 - Mild left atrial enlargement.     5 - Mild mitral regurgitation.     6 - Mild tricuspid regurgitation.   Initial Assessment:   75-year-old female presenting with acute on chronic renal insufficiency, found on labs that were drawn outpatient.  Repeat I-STAT pending.  Differential Diagnosis:   ATN, pre renal from decreased p.o. intake, electrolyte derangement,   Clinical Tests:   Lab Tests: Ordered and Reviewed  ED Management:  - gentle hydration  - UA  - obs to oncology                             Clinical Impression:     ICD-10-CM ICD-9-CM   1. STEFAN (acute kidney injury)  N17.9 584.9   2. Meningioma  D32.9 225.2   3. Stage 3 chronic kidney disease, unspecified whether stage 3a or 3b CKD  N18.30 585.3   4. Ataxia  R27.0 781.3   5. Generalized weakness  R53.1 780.79   6. Generalized abdominal pain  R10.84 789.07   7. History of deep vein thrombosis (DVT) of lower extremity  Z86.718 V12.51   8. Chest pain  R07.9 786.50   9. Essential hypertension  I10 401.9                          ED Disposition Condition    Observation                             Trinidad Brito MD  10/15/20 3686

## 2020-10-15 NOTE — TELEPHONE ENCOUNTER
Spoke to Mrs. St. She reports taking 1 tablet (150 mg total) by mouth once daily on an empty stomach. She reports 1 missed dose. She reports that she usually takes it early morning, but reports that day she had a change in routine. She reports proper handling precautious and pours into cap of container. She reports that she stores her medication on nightstand at room temperature.   She reports feeling weak. She doesnt have the desire to perform her activities of daily living but could if she tried. She reports that this began about 2 months. She reports that the severity has remained the same. She reports that it does not occur every day and cannot attribute it to any precipitating factors. Will inform Dr. Vazquez of this, she has a follow up scheduled with Dr. Vazquez on 10/16. I recommended she discuss this with Dr. Vazuqez. She declined to review the following potential adverse effects: Alopecia (hair loss), anorexia (loss of appetite), asthenia (weakness), conjunctivitis (dry, irritated eyes), cough, diarrhea, dry skin, dyspepsia (indigestion), dysphonia (hoarseness), dyspnea (shortness of breath), fatigue, fever, infection, joint/muscle pain, nausea, peripheral neuropathy (numbness, tingling), pruritus (itching), rash/skin reaction, and stomatitis (mouth sores). Reviewed the following warnings and precautions: breastfeeding, cardiovascular toxicity, dermatologic toxicity, gastrointestinal perforation, hemorrhage, hepatotoxicity, interstitial lung disease, microangiopathic hemolytic anemia, ocular toxicity, pregnancy, and renal toxicity.  She denies any infections, changes in allergies, hypersensitivities, and intolerances, comorbidities, and medications but reported that did not have any time to review medication list.    CrCl is 22.7 mL/min based on a SCr of 1.9 mg/dL. Renal function is stable, renal dosage adjustment is not required. T bili 0.6 mg/dL ALT 16 U/L 26 U/L. Hepatic function is within metrics.  Hepatic dosage adjustment is not required. Next follow up is on 10/16 with Dr. Vazquez.   Mrs. St declined to complete pain tolerance and general health evaluation. Confirmed 2 patient identifiers. No changes in allergies, conditions, and medications.

## 2020-10-15 NOTE — ED TRIAGE NOTES
"Pt reports to ED today w/ complaints of abnormal labs. Per pt no complaints of chest pain, SOB. Pt AAOx4. Pt reports hx of lung cancer. Pt reports appetite change. Pt reports change w/ urination habits " I dont go as often"   "

## 2020-10-15 NOTE — SUBJECTIVE & OBJECTIVE
Oncology Treatment Plan:   [No treatment plan]    Medications:  Continuous Infusions:  Scheduled Meds:  PRN Meds:dextrose 50%, dextrose 50%, glucagon (human recombinant), glucose, glucose, sodium chloride 0.9%     Review of patient's allergies indicates:   Allergen Reactions    Tobradex [tobramycin-dexamethasone] Swelling    Iodinated contrast media Hives    Latex Rash and Hives    Phenytoin sodium extended Other (See Comments) and Rash        Past Medical History:   Diagnosis Date    Anticoagulant long-term use     Blood clot in vein 06/2016    Breast cancer 1994    Cataract     Hypertension     Lung cancer     Lung cancer metastatic to brain     Pancreatic cancer 1998    Posterior capsular opacification, left eye     Psychiatric problem     Seizures 01/25/2016    Stroke     Therapy      Past Surgical History:   Procedure Laterality Date    BONE BIOSPY  3/9/16    BRAIN SURGERY  1/12/16    tumor removal 1/12/16    BREAST LUMPECTOMY  1998    left    CATARACT EXTRACTION Bilateral 2004    yag OU    CHOLECYSTECTOMY  1998    COLONOSCOPY N/A 11/13/2015    Procedure: COLONOSCOPY;  Surgeon: Alex Carmona MD;  Location: [facility] (4TH FLR);  Service: Endoscopy;  Laterality: N/A;  2 year f/u    COLONOSCOPY N/A 11/7/2018    Procedure: COLONOSCOPY;  Surgeon: Jim Raman MD;  Location: [facility] ENDO (4TH FLR);  Service: Endoscopy;  Laterality: N/A;  Eliquis - per Dr. George santiago to hold Eliquis x 2 days prior to colon- ERW    COLONOSCOPY N/A 6/2/2020    Procedure: COLONOSCOPY;  Surgeon: Darius Medina MD;  Location: [facility] ENDO (2ND FLR);  Service: Endoscopy;  Laterality: N/A;  Schedule tomorrow  patient rescheduled due to poor bowel prep-BB  rapid covid test completed on 6/1/20 per RQOUE Zabala, email sent to Endo Staff-BB  updated instructions emailed to patient-BB  Latex allergy  approval to hold Eliquis received, see telephone encounter 5/18/20-BB      cysto and right ureteral stent  4/27/12    juliano       removal of blockage, done throat, then through the liver.    ESOPHAGOGASTRODUODENOSCOPY N/A 2020    Procedure: EGD (ESOPHAGOGASTRODUODENOSCOPY);  Surgeon: Darius Medina MD;  Location: [facility] ([address] [place] [address]);  Service: Endoscopy;  Laterality: N/A;  Schedule tomorrow  patient rescheduled due to poor bowel prep-BB  rapid covid test completed on 20 per ROQUE Zabala, email sent to Endo Staff-BB  updated instructions emailed to patient-BB  Latex allergy  approval to hold Eliquis received, see telephone     HYSTERECTOMY  1974    KIDNEY STONE SURGERY  --laser    left eswl  12    OOPHORECTOMY  2012    Laparoscopic BSO, lysis of adhesions, cystoscopy greater than 35mins     WHIPPLE PROCEDURE W/ LAPAROSCOPY       Family History     Problem Relation (Age of Onset)    Breast cancer Mother    Leukemia Paternal Grandfather    Lung cancer Mother    Stomach cancer Paternal Grandmother        Tobacco Use    Smoking status: Former Smoker     Packs/day: 0.00     Years: 0.00     Pack years: 0.00     Quit date: 1961     Years since quittin.0    Smokeless tobacco: Never Used   Substance and Sexual Activity    Alcohol use: No    Drug use: No    Sexual activity: Not Currently     Partners: Male     Birth control/protection: See Surgical Hx       Review of Systems   Constitutional: Positive for appetite change and fatigue. Negative for fever and unexpected weight change.   HENT: Negative for congestion and trouble swallowing.    Eyes: Negative.    Respiratory: Positive for cough. Negative for chest tightness, shortness of breath and wheezing.    Cardiovascular: Positive for leg swelling. Negative for chest pain and palpitations.   Gastrointestinal: Positive for vomiting. Negative for abdominal distention, abdominal pain, blood in stool, constipation, diarrhea and nausea.   Endocrine: Negative.    Genitourinary: Negative for dysuria, flank pain and frequency.   Musculoskeletal: Negative.     Skin: Negative.    Neurological: Positive for dizziness.     Objective:     Vital Signs (Most Recent):  Temp: 97.8 °F (36.6 °C) (10/15/20 1506)  Pulse: 64 (10/15/20 1720)  Resp: 19 (10/15/20 1720)  BP: (!) 151/65 (10/15/20 1720)  SpO2: 98 % (10/15/20 1720) Vital Signs (24h Range):  Temp:  [97.8 °F (36.6 °C)] 97.8 °F (36.6 °C)  Pulse:  [62-68] 64  Resp:  [18-20] 19  SpO2:  [97 %-100 %] 98 %  BP: (124-159)/(61-72) 151/65     Weight: 59 kg (130 lb)  Body mass index is 21.63 kg/m².  Body surface area is 1.64 meters squared.      Intake/Output Summary (Last 24 hours) at 10/15/2020 1752  Last data filed at 10/15/2020 1727  Gross per 24 hour   Intake 1000 ml   Output --   Net 1000 ml       Physical Exam  Constitutional:       General: She is not in acute distress.     Appearance: Normal appearance.   HENT:      Head: Normocephalic and atraumatic.   Cardiovascular:      Rate and Rhythm: Normal rate and regular rhythm.      Heart sounds: No murmur.   Pulmonary:      Effort: No respiratory distress.      Breath sounds: No wheezing or rales.   Abdominal:      General: Abdomen is flat. Bowel sounds are normal. There is no distension.      Palpations: Abdomen is soft.   Skin:     Coloration: Skin is not jaundiced or pale.   Neurological:      General: No focal deficit present.      Mental Status: She is oriented to person, place, and time.         Significant Labs:   CBC:   Recent Labs   Lab 10/15/20  1324   WBC 4.46   HGB 9.3*   HCT 31.0*      , CMP:   Recent Labs   Lab 10/15/20  1324      K 4.8   *   CO2 16*   GLU 97   BUN 83*   CREATININE 4.8*   CALCIUM 7.5*   PROT 6.5   ALBUMIN 3.1*   BILITOT 0.8   ALKPHOS 126   AST 25   ALT 56*   ANIONGAP 12   EGFRNONAA 8.2*    and Urine Studies:   Recent Labs   Lab 10/15/20  1708   COLORU Straw   APPEARANCEUA Clear   PHUR 5.0   SPECGRAV 1.010   PROTEINUA Negative   GLUCUA Negative   KETONESU Negative   BILIRUBINUA Negative   OCCULTUA Negative   NITRITE Negative    LEUKOCYTESUR Negative       Diagnostic Results:  None

## 2020-10-15 NOTE — ASSESSMENT & PLAN NOTE
STEFAN on CKD, baseline creatinine 2.0   Likely pre-renal in the setting of poor po intake, and BUN:Cr ratio > 20:1   She received 1L LR.   - Obtain US retroperitoneal to r/o obstruction   - send for urine electrolytes   - continue with gentle hydration with NS @ 84 ml / hour .   - If no improvement by tomorrow, consider nephrology evaluation   - Continue calcium carbonate

## 2020-10-16 NOTE — HPI
Naty St male with a past medical history of hypertension, DVT, significant cancer history of metastatic right lung adenocarcinoma on Tarceva, breast cancer status post lumpectomy, meningioma status post resection, pancreatic cancer status post Whipple, status post hysterectomy with bilateral salpingo-oophorectomy who presented to the emergency department with reporting of fatigue for the last week.  Labs in the ER demonstrated elevated creatinine of 4.8, increased from baseline.  Reports one episode of nonbilious nonbloody emesis as well as decreased oral intake for the last few days.  Patient also reports she has had dysuria for the last 2-3 days.  Given 1 L lactated.  Patient denies any prior to admission.  Reports she has not taken her Lasix, which prescribed p.r.n. for lower extremity edema, in the last month.      UA did not demonstrate any significant proteinuria or signs of infection.  Retroperitoneal ultrasound demonstrated medical renal disease, no acute causes of urinary obstruction.  Urine K 15, urine sodium 86, urine urea nitrogen 320, urine creatinine 36, urine osmolality 336. FeUrea 51.4%, consistent with intrinsic renal etiology of STEFAN.

## 2020-10-16 NOTE — CONSULTS
[facility]  Nephrology  Consult Note    Patient Name: Naty St  MRN: [id number]  Admission Date: 10/15/2020  Hospital Length of Stay: 0 days  Attending Provider: Luz Marina Huerta MD   Primary Care Physician: Olvin Mars MD  Principal Problem:<principal problem not specified>    Inpatient consult to Nephrology  Consult performed by: Dallas Benavides MD  Consult ordered by: Everardo Rios MD        Subjective:     HPI: Naty St male with a past medical history of hypertension, DVT, significant cancer history of metastatic right lung adenocarcinoma on Tarceva, breast cancer status post lumpectomy, meningioma status post resection, pancreatic cancer status post Whipple, status post hysterectomy with bilateral salpingo-oophorectomy who presented to the emergency department with reporting of fatigue for the last week.  Labs in the ER demonstrated elevated creatinine of 4.8, increased from baseline.  Reports one episode of nonbilious nonbloody emesis as well as decreased oral intake for the last few days.  Patient also reports she has had dysuria for the last 2-3 days.  Given 1 L lactated.  Patient denies any prior to admission.  Reports she has not taken her Lasix, which prescribed p.r.n. for lower extremity edema, in the last month.      UA did not demonstrate any significant proteinuria or signs of infection.  Retroperitoneal ultrasound demonstrated medical renal disease, no acute causes of urinary obstruction.  Urine K 15, urine sodium 86, urine urea nitrogen 320, urine creatinine 36, urine osmolality 336. FeUrea 51.4%, consistent with intrinsic renal etiology of STEFAN.       Past Medical History:   Diagnosis Date    Anticoagulant long-term use     Blood clot in vein 06/2016    Breast cancer 1994    Cataract     Hypertension     Lung cancer     Lung cancer metastatic to brain     Pancreatic cancer 1998    Posterior capsular opacification, left eye     Psychiatric problem      Seizures 01/25/2016    Stroke     Therapy        Past Surgical History:   Procedure Laterality Date    BONE BIOSPY  3/9/16    BRAIN SURGERY  1/12/16    tumor removal 1/12/16    BREAST LUMPECTOMY  1998    left    CATARACT EXTRACTION Bilateral 2004    yag OU    CHOLECYSTECTOMY  1998    COLONOSCOPY N/A 11/13/2015    Procedure: COLONOSCOPY;  Surgeon: Alex Carmona MD;  Location: [facility] (4TH FLR);  Service: Endoscopy;  Laterality: N/A;  2 year f/u    COLONOSCOPY N/A 11/7/2018    Procedure: COLONOSCOPY;  Surgeon: Jim Raman MD;  Location: [facility] (4TH FLR);  Service: Endoscopy;  Laterality: N/A;  Eliquis - per Dr. George santiago to hold Eliquis x 2 days prior to colon- ERW    COLONOSCOPY N/A 6/2/2020    Procedure: COLONOSCOPY;  Surgeon: Darius Medina MD;  Location: [facility] (2ND FLR);  Service: Endoscopy;  Laterality: N/A;  Schedule tomorrow  patient rescheduled due to poor bowel prep-BB  rapid covid test completed on 6/1/20 per ROQUE Zabala, email sent to Endo Staff-BB  updated instructions emailed to patient-BB  Latex allergy  approval to hold Eliquis received, see telephone encounter 5/18/20-BB      cysto and right ureteral stent  4/27/12    ecoli  2010    removal of blockage, done throat, then through the liver.    ESOPHAGOGASTRODUODENOSCOPY N/A 6/2/2020    Procedure: EGD (ESOPHAGOGASTRODUODENOSCOPY);  Surgeon: Darius Medina MD;  Location: [facility] (2ND FLR);  Service: Endoscopy;  Laterality: N/A;  Schedule tomorrow  patient rescheduled due to poor bowel prep-BB  rapid covid test completed on 6/1/20 per ROQUE Zabala, email sent to Endo Staff-BB  updated instructions emailed to patient-BB  Latex allergy  approval to hold Eliquis received, see telephone     HYSTERECTOMY  1974    KIDNEY STONE SURGERY  2010--laser    left eswl  6/20/12    OOPHORECTOMY  4/25/2012    Laparoscopic BSO, lysis of adhesions, cystoscopy greater than 35mins     WHIPPLE PROCEDURE W/ LAPAROSCOPY  1998       Review of  patient's allergies indicates:   Allergen Reactions    Tobradex [tobramycin-dexamethasone] Swelling    Iodinated contrast media Hives    Latex Rash and Hives    Phenytoin sodium extended Other (See Comments) and Rash     Current Facility-Administered Medications   Medication Frequency    0.9%  NaCl infusion Continuous    amitriptyline tablet 50 mg QHS    amLODIPine tablet 5 mg Daily    apixaban tablet 5 mg BID    calcium carbonate (OS-UNIQUE) tablet 500 mg Daily    dextrose 50% injection 12.5 g PRN    dextrose 50% injection 25 g PRN    glucagon (human recombinant) injection 1 mg PRN    glucose chewable tablet 16 g PRN    glucose chewable tablet 24 g PRN    levETIRAcetam tablet 500 mg BID    lipase-protease-amylase 12,000-38,000-60,000 units per capsule 1 capsule TID WM    LORazepam tablet 1 mg Q12H PRN    oxybutynin 24 hr tablet 5 mg Daily    sodium chloride 0.9% flush 10 mL PRN     Family History     Problem Relation (Age of Onset)    Breast cancer Mother    Leukemia Paternal Grandfather    Lung cancer Mother    Stomach cancer Paternal Grandmother        Tobacco Use    Smoking status: Former Smoker     Packs/day: 0.00     Years: 0.00     Pack years: 0.00     Quit date: 1961     Years since quittin.0    Smokeless tobacco: Never Used   Substance and Sexual Activity    Alcohol use: No    Drug use: No    Sexual activity: Not Currently     Partners: Male     Birth control/protection: See Surgical Hx     Review of Systems   Constitutional: Negative for chills and fever.   HENT: Negative for hearing loss, postnasal drip, rhinorrhea, sore throat and tinnitus.    Respiratory: Negative for cough and shortness of breath.    Cardiovascular: Negative for chest pain and leg swelling.   Gastrointestinal: Negative for abdominal distention, abdominal pain, blood in stool, constipation, diarrhea, nausea and vomiting.   Genitourinary: Positive for dysuria. Negative for flank pain and hematuria.    Musculoskeletal: Negative for back pain.   Skin: Negative for rash.   Neurological: Negative for syncope, weakness and headaches.   Hematological: Does not bruise/bleed easily.     Objective:     Vital Signs (Most Recent):  Temp: 97.5 °F (36.4 °C) (10/16/20 1122)  Pulse: 65 (10/16/20 1122)  Resp: 16 (10/16/20 1122)  BP: 139/63 (10/16/20 1122)  SpO2: (!) 93 % (10/16/20 1122)  O2 Device (Oxygen Therapy): room air (10/16/20 1122) Vital Signs (24h Range):  Temp:  [97.5 °F (36.4 °C)-98.4 °F (36.9 °C)] 97.5 °F (36.4 °C)  Pulse:  [56-70] 65  Resp:  [15-20] 16  SpO2:  [93 %-100 %] 93 %  BP: (121-159)/(60-72) 139/63     Weight: 60 kg (132 lb 4.4 oz) (10/15/20 1919)  Body mass index is 22.01 kg/m².  Body surface area is 1.66 meters squared.    I/O last 3 completed shifts:  In: 1677.5 [P.O.:240; I.V.:437.5; IV Piggyback:1000]  Out: 700 [Urine:700]    Physical Exam  Constitutional:       Appearance: Normal appearance.   HENT:      Head: Normocephalic.      Mouth/Throat:      Mouth: Mucous membranes are moist.   Eyes:      Extraocular Movements: Extraocular movements intact.   Cardiovascular:      Rate and Rhythm: Normal rate and regular rhythm.      Pulses: Normal pulses.      Heart sounds: Normal heart sounds.   Pulmonary:      Effort: No respiratory distress.      Breath sounds: Normal breath sounds. No wheezing or rales.   Abdominal:      General: Abdomen is flat. There is no distension.      Palpations: Abdomen is soft.      Tenderness: There is no abdominal tenderness. There is no guarding or rebound.   Skin:     Capillary Refill: Capillary refill takes less than 2 seconds.   Neurological:      General: No focal deficit present.      Mental Status: She is alert and oriented to person, place, and time. Mental status is at baseline.         Significant Labs:  ABGs: No results for input(s): PH, PCO2, HCO3, POCSATURATED, BE in the last 168 hours.  BMP:   Recent Labs   Lab 10/16/20  0336   GLU 83   *   CO2 13*   BUN 80*    CREATININE 4.7*   CALCIUM 7.2*     Cardiac Markers: No results for input(s): CKMB, TROPONINT, MYOGLOBIN in the last 168 hours.  CBC:   Recent Labs   Lab 10/16/20  0336   WBC 4.93   RBC 3.29*   HGB 9.1*   HCT 31.2*      MCV 95   MCH 27.7   MCHC 29.2*     CMP:   Recent Labs   Lab 10/15/20  1324 10/16/20  0336   GLU 97 83   CALCIUM 7.5* 7.2*   ALBUMIN 3.1*  --    PROT 6.5  --     141   K 4.8 4.8   CO2 16* 13*   * 115*   BUN 83* 80*   CREATININE 4.8* 4.7*   ALKPHOS 126  --    ALT 56*  --    AST 25  --    BILITOT 0.8  --      LFTs:   Recent Labs   Lab 10/15/20  1324   ALT 56*   AST 25   ALKPHOS 126   BILITOT 0.8   PROT 6.5   ALBUMIN 3.1*     PTH: No results for input(s): PTH in the last 168 hours.  TSH: No results for input(s): TSH in the last 168 hours.  All labs within the past 24 hours have been reviewed.    Significant Imaging:  Labs: Reviewed       Assessment/Plan:     STEFAN (acute kidney injury)  75 year old with PMHx of HTN presenting for STEFAN w/ Cr 4.8, baseline 2.0  Retroperitoneal US- medical renal disease. No obstruction  FeUrea 51.4% - consistent with intrinsic renal disease. However, elevated FeUrea may be less diagnostic due to patients age.   Post 1 LR, Cr minimally improved to 4.7  Patient neither dry or fluid overloaded   Likely etiology ATN vs Pre-renal  Urine microscopy by MD on 10/16- No casts, occasional WBC clumps  Erlotinib can cause STEFAN and given patient has been on this medication in the past, may be possible etiology.   Likely etiology of STEFAN may be pre-renal and renal injury 2/2 erlotinib.    Recommendations:  Continue NS at 75 cc/hr.  Hold nephrotoxic medications.   MAP goal >65  Transfuse for Hgb <7  Recommend holding losartan in the setting of kidney insult.          Thank you for your consult. I will follow-up with patient. Please contact us if you have any additional questions.    Dallas Benavides MD  Nephrology  [facility]

## 2020-10-16 NOTE — SUBJECTIVE & OBJECTIVE
Interval History: maintained on gentle hydration. FeUrea calculated 52%, indicating intrinsic etiology. Us retroperitoneal showed no hydronephrosis. Creatinine remains elevated at 4.7    Oncology Treatment Plan:   [No treatment plan]    Medications:  Continuous Infusions:   sodium chloride 0.9% 75 mL/hr at 10/15/20 2010     Scheduled Meds:   amitriptyline  50 mg Oral QHS    amLODIPine  5 mg Oral Daily    apixaban  5 mg Oral BID    calcium carbonate  1,000 mg Oral Daily    levETIRAcetam  500 mg Oral BID    lipase-protease-amylase 12,000-38,000-60,000 units  1 capsule Oral TID WM    oxybutynin  5 mg Oral Daily     PRN Meds:dextrose 50%, dextrose 50%, glucagon (human recombinant), glucose, glucose, LORazepam, sodium chloride 0.9%     Review of Systems   Constitutional: Positive for fatigue. Negative for appetite change, fever and unexpected weight change.   HENT: Negative for congestion.    Eyes: Negative for pain and visual disturbance.   Respiratory: Negative for cough, chest tightness, shortness of breath and wheezing.    Cardiovascular: Positive for leg swelling. Negative for chest pain and palpitations.   Gastrointestinal: Negative for abdominal pain, blood in stool, constipation, diarrhea, nausea and vomiting.   Genitourinary: Negative for difficulty urinating, flank pain, frequency, hematuria and urgency.   Musculoskeletal: Negative for arthralgias, back pain and myalgias.   Neurological: Negative for dizziness, weakness, numbness and headaches.   Psychiatric/Behavioral: The patient is not nervous/anxious.      Objective:     Vital Signs (Most Recent):  Temp: 98.2 °F (36.8 °C) (10/16/20 0500)  Pulse: 64 (10/16/20 0500)  Resp: 16 (10/16/20 0500)  BP: 134/63 (10/16/20 0500)  SpO2: 95 % (10/16/20 0500) Vital Signs (24h Range):  Temp:  [97.8 °F (36.6 °C)-98.4 °F (36.9 °C)] 98.2 °F (36.8 °C)  Pulse:  [61-70] 64  Resp:  [16-20] 16  SpO2:  [93 %-100 %] 95 %  BP: (121-159)/(60-72) 134/63     Weight: 60 kg (132  lb 4.4 oz)  Body mass index is 22.01 kg/m².  Body surface area is 1.66 meters squared.      Intake/Output Summary (Last 24 hours) at 10/16/2020 0832  Last data filed at 10/16/2020 0200  Gross per 24 hour   Intake 1677.5 ml   Output 700 ml   Net 977.5 ml       Physical Exam  Constitutional:       General: She is not in acute distress.     Appearance: Normal appearance.   HENT:      Head: Normocephalic and atraumatic.   Eyes:      Pupils: Pupils are equal, round, and reactive to light.   Cardiovascular:      Rate and Rhythm: Normal rate and regular rhythm.      Heart sounds: No murmur.   Pulmonary:      Effort: No respiratory distress.      Breath sounds: Normal breath sounds. No wheezing.   Abdominal:      General: Abdomen is flat. Bowel sounds are normal. There is no distension.      Palpations: Abdomen is soft. There is no mass.      Tenderness: There is no abdominal tenderness.   Musculoskeletal:         General: No swelling or deformity.      Right lower leg: Edema present.      Left lower leg: Edema present.   Skin:     Coloration: Skin is not jaundiced.   Neurological:      General: No focal deficit present.      Mental Status: She is alert and oriented to person, place, and time. Mental status is at baseline.         Significant Labs:   CBC:   Recent Labs   Lab 10/15/20  1324 10/16/20  0336   WBC 4.46 4.93   HGB 9.3* 9.1*   HCT 31.0* 31.2*    213    and CMP:   Recent Labs   Lab 10/15/20  1324 10/16/20  0336    141   K 4.8 4.8   * 115*   CO2 16* 13*   GLU 97 83   BUN 83* 80*   CREATININE 4.8* 4.7*   CALCIUM 7.5* 7.2*   PROT 6.5  --    ALBUMIN 3.1*  --    BILITOT 0.8  --    ALKPHOS 126  --    AST 25  --    ALT 56*  --    ANIONGAP 12 13   EGFRNONAA 8.2* 8.4*       Diagnostic Results:  I have reviewed all pertinent imaging results/findings within the past 24 hours.

## 2020-10-16 NOTE — PLAN OF CARE
Plan of care discussed on admission. Resting with eyes closed. Respirations even, unlabored. Skin warm and dry. Denies pain. Denies nausea. Frequent checks for pain and safety maintained. Ambulating to bathroom with assistance. Bed in lowest position, wheels locked, side rails up x's 2, call light in reach. Instructed to call for assistance as needed, verbalizes understanding. Will continue to monitor.

## 2020-10-16 NOTE — ASSESSMENT & PLAN NOTE
75 year old with PMHx of HTN presenting for STEFAN w/ Cr 4.8, baseline 2.0  Retroperitoneal US- medical renal disease. No obstruction  FeUrea 51.4% - consistent with intrinsic renal disease. However, elevated FeUrea may be less diagnostic due to patients age.   Post 1 LR, Cr minimally improved to 4.7  Patient neither dry or fluid overloaded   Likely etiology ATN vs Pre-renal  Urine microscopy by MD on 10/16- No casts, occasional WBC clumps  Erlotinib can cause STEFAN and given patient has been on this medication in the past, may be possible etiology.   Likely etiology of STEFAN may be pre-renal and renal injury 2/2 erlotinib.    Recommendations:  Continue NS at 75 cc/hr.  Hold nephrotoxic medications.   MAP goal >65  Transfuse for Hgb <7  Recommend holding losartan in the setting of kidney insult.

## 2020-10-16 NOTE — PLAN OF CARE
Pt involved in plan of care and communicating needs throughout shift.  Son at bedside and involved in care. Pt up in room with SBA; generalized weakness noted but gait steady.  No c/o pain or discomfort today.  Tolerating regular diet with good appetite noted; voiding without difficulty.  Nephrology consulted for STEFAN; creatinine=4.7  IVF infusing throughout shift as ordered. All VSS; no acute events so far this shift.  Pt remaining free from falls or injury throughout shift; bed in lowest position; call light within reach.  Pt instructed to call for assistance as needed.  Q1H rounding done on pt.

## 2020-10-16 NOTE — ASSESSMENT & PLAN NOTE
STEFAN on CKD, baseline creatinine 2.0    She received 1L LR, and maintained on gentle hydration at 75ml/hour   - US retroperitoneal showed no hydronephrosis   - FeUrea 54%  - Could be progression of her CKD?  - Nephrology evaluation.

## 2020-10-16 NOTE — PROGRESS NOTES
Admit Assessment    Patient Identification  Naty St   :  1945  Admit Date:  10/15/2020  Attending Provider:  Luz Marina Huerta MD              Referral:   Pt was admitted to the medical oncology unit with an oncology diagnosis of metastatic R lung adenocarcinoma, history of pancreatic cancer 17 years ago, breast cancer and meningioma.  <principal problem not specified>, and was admitted this hospital stay due to Ataxia [R27.0]  Generalized abdominal pain [R10.84]  Meningioma [D32.9]  Essential hypertension [I10]  STEFAN (acute kidney injury) [N17.9]  Generalized weakness [R53.1]  Chest pain [R07.9]  History of deep vein thrombosis (DVT) of lower extremity [Z86.718]  Stage 3 chronic kidney disease, unspecified whether stage 3a or 3b CKD [N18.30].       is involved with the patient and was through a routine referral the Social Work Department. Patient presents as a 75 y.o. year old  female.    Persons interviewed: Naty St  Living Situation:    Patient lives with her  Troy St in a single family home which has 8 steps to the entrance. Patient stated that she ambulates independently without assistance to enter her home. Patient resides at [address] [place] [address]   Phone: [phone] (home).    Cell phone Son, Basil Lopez [phone]  Cell phone , Troy St [phone]    (RETIRED) Functional Status Prior  Ambulation Prior: 0-->independent  Transferrin-->independent  Toiletin-->independent  Bathin-->assistive equipment  Dressin-->independent  Eatin-->independent  Communication: understands/communicates without difficulty  Swallowing: swallows foods/liquids without difficulty    Current or Past Agencies and Description of Services/Supplies    DME  Agency Name: [facility]  Agency Phone Number: [phone]       Home Health  Agency Name: [facility]  Agency Phone Number:[phone]  Services: Nursing,  PT/OT 2017    IV Infusion  Agency Name: None  Agency Phone Number: None    Nutrition: Regular diet    Outpatient Pharmacy:     [facility] [address] [facility]  [address] [facility] [address]  Phone: [phone] Fax: [phone]    [facility] [place] [place]  [address]  [facility] [address]  Phone: [phone] Fax: [phone]    [facility]  [address] [facility] [address]  Phone: [phone] Fax: [phone]    [facility] - [address] [address]  Phone: [phone] Fax: [phone]      Patient Preference of agencies include [facility]    Patient/Caregiver informed of right to choose providers or agencies.  Patient provides permission to release any necessary information to [facility] and to Non[facility] agencies as needed to facilitate patient care, treatment planning, and patient discharge planning.  Written and verbal resources provided.      Coping  Patient reports that she is coping day to day. Patient did not identify any specific coping skills used to reduce stress, frustration and anxiety.    Adjustment to Diagnosis and Treatment  Patient stated that she is Islam and has strong beliefs and ligia and reports that due to her illness and treatments she can't do the things that she previously enjoyed doing such as reading, and writing. She reported prior to 2017 when she had a stroke that writing was her hobby but can't do that anymore. Patient reported that attending [employer] was enjoyable but since COVID-19, she can only view [employer] services on television and miss the fellowship. So she is trying to adjust but its difficult with [place] of [place] the country.     Emotional  Patient reported that she becomes loud and boisterous and screams-out emotionally. Patient stated the last episode was 2-3 weeks ago.    Behavioral  Patient describes her behavioral  issues as being isolative with decreased activities with doing things she once enjoyed.    Cognitive Issues  Patient stated since her stroke in 2017 her ability to remember things has decreased.      History/Current Symptoms of Anxiety/Depression: No  History/Current Substance Use: No  Social History     Tobacco Use    Smoking status: Former Smoker     Packs/day: 0.00     Years: 0.00     Pack years: 0.00     Quit date: 1961     Years since quittin.0    Smokeless tobacco: Never Used   Substance and Sexual Activity    Alcohol use: No    Drug use: No    Sexual activity: Not Currently     Partners: Male     Birth control/protection: See Surgical Hx       Indications of Abuse/Neglect: No  Abuse Screen (yes response referral indicated) No  Feels Unsafe at Home or Work/School: No    Financial:  Payor/Plan Subscr  Sex Relation Sub. Ins. ID Effective Group Num   1. [employer] MANAGE* BETTIE HAYESJASMINE BLAIR* 1945 Female  [id number]                                   [address] [address]          Other identified concerns/needs: Patient stated that she has no needs at this time. She reported that she has a wheelchair, and a walker at home from a previous admission at [facility]. Patient is not currently on oxygen at this time but reported that she had home o2 for 2 weeks after her hospital stay in 2018. Patient stated when she is discharged she will be transported home by family.    Plan:  Pending  Interventions/Referrals: Pending    Patient/caregiver engaged in treatment planning process. Son, Basil Lopez     providing psychosocial and supportive counseling, resources, education, assistance and discharge planning as appropriate.  Patient/caregiver state understanding of  available resources,  following, remains available.

## 2020-10-16 NOTE — HOSPITAL COURSE
10/16: maintained on gentle hydration. FeUrea calculated 52%, indicating intrinsic etiology. Us retroperitoneal showed no hydronephrosis. Creatinine remains elevated at 4.7  10/17: Patient seen by nephrology. Recommended to continue NS fluids. Cr improved to 4.0 on BMP. Will D/C patient home with nephrology follow-up. Will hold Tarceva and Losartan for now.

## 2020-10-16 NOTE — PROGRESS NOTES
[facility]  Hematology/Oncology  Progress Note    Patient Name: Naty St  Admission Date: 10/15/2020  Hospital Length of Stay: 0 days  Code Status: DNR     Subjective:     HPI:  Mrs. St, 75 years old female patient with history of HTN, DVT, metastatic R lung adenocarcinoma on on Tarceva, breast ca s/p L lumpectomy, meningioma s/p resection, pancreatic ca s/p whipple, and s/p hysterectomy w/ b/l salpingo-oophorectomy, HTN who presented to ER with abnormal lab concerning of elevated creatinine.   She said she has been feeling more fatigued, and tired than usual in the last week. She has noted episodes of mild dizziness. Her oral hydration has decreased as per her son, but still maintains fair appetite. She had routine labs today which showed creatinine of 4.8 (baseline 2.0). She was instructed by Dr. Vazquez to go to ER.   She denies fever, chills, headaches, worsening cough, abdominal pain, or dysuria. She denies diarrhea. She has 1x NBNB vomiting during last week.   In the ER, she received 1L of LR. UA negative for infection. She is placed in observation for STEFAN     Oncology history (from Dr. Vazquez's notes)   Ms. Naty St was admitted to the hospital between 01/25/2016 and 01/28/2016. She has a history of pancreatic cancer 17 years ago, breast cancer and meningioma, presented to the hospital complaining of loss of consciousness. The patient apparently was in her normal state of health and she stood up to go to the bathroom and fell to the floor. The patient's daughter helped the mother up in the bathroom and noted that her mother's upper extremities were shaking and eye rolling. No reports of bowel or bladder incontinence, tongue biting or rolling. The second episode lasted about four minutes and she was extremely lethargic following that. Apparently, the patient had a meningioma resection done in the past; however, recently, underwent neurosurgical resection with Dr. 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    Additional immunohistochemical stains were performed which revealed the tumor cells to be are positive for TTF1 and negative for ER and GCDFP. The morphology and TTF1 positivity are most consistent with lung primary.    Of note, imaging scan at the end of January 2016 revealed a mass in the lung at 2 cm in the medial aspect of the apical segment of the right upper lobe abutting the mediastinum at the level of the azygous vein and abutting and possibly encasing the segmental bronchi and vessels of the apical segment of the right upper lobe and no pleural fluid was present. Also, there is an enlarged right paratracheal lymph node. No evidence of any metastatic disease at the pancreatic site with postoperative changes post Whipple disease  Her PET Scan from 2/15/16 reveal "Hypermetabolic mass in the right lung apex consistent with a primary malignancy. Hypermetabolic mediastinal lymph nodes consistent with metastatic disease. Right sacral hypermetabolic lesion consistent with metastatic disease, noting additional mildly sclerotic lesions in multiple vertebral bodies which do not demonstrate abnormal hypermetabolism  She underwent IR bone biopsy which revealed metastatic adenocarcinoma of lung origin. 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  She has completed focal RT to brain lesions in March 2016.    PET scan from 6/6/16 shows "Dramatic almost complete response to therapy."  Lovenox started after US lower ext 6/7/16 shows Acute complete occlusion of one of the left posterior tibial vein.Remote partial thrombus of the proximal left superficial femoral vein.  She is on Tarceva.   12/6/16 PET scan reveals "Stable right upper lobe lesion and right hilar lymph node. No new lesions identified. Trace left pleural effusion".  1/27/17 Renal u/s "Medical renal disease. " "Nonobstructive right nephrolithiasis"  1/31/17 PET - "Right upper lobe nodule and right hilar lymph node similar and very low grade activity.  There is no definite evidence of recurrence."   11/9/17 PET "In this patient with history of lung cancer, there is interval increase in size and hypermetabolism of a right upper lobe lung lesion concerning for recurrent disease. Additionally, there is interval appearance of a hypermetabolic paratracheal lymph node suspicious for metastatic disease"     She progressed on Tarceva She underwent EBUS on 12/5/17. Pathology revealed adenocarcinoma but T790m could not be done as quantity was not insufficient. Her PET scan from 1/30/18 revealed The patient's previously identified abnormal lesions is slightly greater uptake of FDG on today's study compared with prior exam. These findings are concerning for progression of disease"      She was hospitalized between 4/9/18-4/16/18. Her hospital course was as follows "Patient was admitted to hemonc service on 4/9 with acute hypoxic respiratory failure. She was requiring 4 L of nc on admission. She was started on moxi for CAP. She received one dose of ceftriaxone in the ED. On 2nd day of admission she spiked a fever. Her Hb was ~7 g/dl so she was transfused 1 unit of PRBCs. Over night she developed worsening of her symptoms with increased O2 requirements to 15 L HFNC. Her CXR showed worsening congestion. There was a concern of TRALI vs TACO. New 2D echo with diastolic dysfunction. She was given IV lasix pushes as needed and was started on dexamethasone.  Her abx was escalated to vanc and cefepime. She improved with diuresis and steroids. Pulmonary were consulted. Her O2 requirements continued to improve. On 4/15 she was switched to Augmentin. PT recommended discharging her to SNF but the patient refused and she wanted to go home with home health. She qualified for home oxygen so she was discharged on 3 L nc while at rest and 6 while " "active. Augmentin and dexamethasone were discontinued on discharge"     She was readmitted from 4/27 to 5/3/18 with who presented to the ED with a complaint of fatigue and worsening DELA CRUZ. Pt diagnosed PNA 4/9 and was discharged on 4/16 on 3L oxygen. She was initially placed on cefepime for 3 days followed by an add'l 2 days of Augmentin. Pulm was consulted with assistance as her oxygen requirements were increasing. She was placed on oral steroids, then trailed on 80mg TID solumedrol for 2 days and will be discharged on a prednisone taper. She was also diuresed with 2 days of 40mg IV lasix with appropriate UOP resulting, improvement on repeat CXR. She was medically stable and appropriate for DC home with home health on 1L continuous O2. She will be seen for close f/u and may be able to come off oxygen at that time.      She discontinued Tagrisso in April 2018. Restaging scans from 6/4/18 revealed "Stable appearance of a spiculated right upper lobe pulmonary lesion.  Additional irregular focus superior to the lesion in the right lung apex is stable and may represent scar or additional lesion; although this was not hypermetabolic on prior PET-CT.  No new pulmonary lesions. 1.3 cm subcarinal lymph node, not hypermetabolic on prior PET-CT. Stable postsurgical changes of a Whipple procedure. Bilateral nonobstructing nephrolithiasis.  Stable sclerotic focus in the T5 vertebral body, possibly a bone island as this was not hypermetabolic on prior PET-CT. Small hiatal hernia. RECIST SUMMARY: Date of prior examination for comparison 01/30/2018 Lesion 1: Right upper lobe 1.3 cm Series 2 image 31 prior measurement 1.3 cm"  Bone scan also from 6/4/18 revealed no evidence of mets.     Her PET scan from 7/11/18 revealed "Right suprahilar lesion SUV max 3.76, previously 6.86. Pretracheal lymph node SUV max 2.55, previously 3.79. There is physiologic intracranial, head, and neck activity.  There is muscle activation.  There is vocal " "cord activation.  There is physiologic liver, spleen, GI and  activity.  There is a hiatal hernia.  Pelvic organs show nothing unusual.  No bone lesions are seen.  There are areas of benign appearing lung scar"  She notes fatigue.  She denies any nausea, vomiting, diarrhea, constipation, abdominal pain, weight loss or loss of appetite, chest pain, shortness of breath, leg swelling, fatigue, pain, headache, dizziness, or mood changes. Her ECOG PS is 2. She is accompanied by her  and son     She has been on Tarceva since 6/5/18. She has now completed SBRT to her right lung lesions.            Interval History: maintained on gentle hydration. FeUrea calculated 52%, indicating intrinsic etiology. Us retroperitoneal showed no hydronephrosis. Creatinine remains elevated at 4.7    Oncology Treatment Plan:   [No treatment plan]    Medications:  Continuous Infusions:   sodium chloride 0.9% 75 mL/hr at 10/15/20 2010     Scheduled Meds:   amitriptyline  50 mg Oral QHS    amLODIPine  5 mg Oral Daily    apixaban  5 mg Oral BID    calcium carbonate  1,000 mg Oral Daily    levETIRAcetam  500 mg Oral BID    lipase-protease-amylase 12,000-38,000-60,000 units  1 capsule Oral TID WM    oxybutynin  5 mg Oral Daily     PRN Meds:dextrose 50%, dextrose 50%, glucagon (human recombinant), glucose, glucose, LORazepam, sodium chloride 0.9%     Review of Systems   Constitutional: Positive for fatigue. Negative for appetite change, fever and unexpected weight change.   HENT: Negative for congestion.    Eyes: Negative for pain and visual disturbance.   Respiratory: Negative for cough, chest tightness, shortness of breath and wheezing.    Cardiovascular: Positive for leg swelling. Negative for chest pain and palpitations.   Gastrointestinal: Negative for abdominal pain, blood in stool, constipation, diarrhea, nausea and vomiting.   Genitourinary: Negative for difficulty urinating, flank pain, frequency, hematuria and urgency. "   Musculoskeletal: Negative for arthralgias, back pain and myalgias.   Neurological: Negative for dizziness, weakness, numbness and headaches.   Psychiatric/Behavioral: The patient is not nervous/anxious.      Objective:     Vital Signs (Most Recent):  Temp: 98.2 °F (36.8 °C) (10/16/20 0500)  Pulse: 64 (10/16/20 0500)  Resp: 16 (10/16/20 0500)  BP: 134/63 (10/16/20 0500)  SpO2: 95 % (10/16/20 0500) Vital Signs (24h Range):  Temp:  [97.8 °F (36.6 °C)-98.4 °F (36.9 °C)] 98.2 °F (36.8 °C)  Pulse:  [61-70] 64  Resp:  [16-20] 16  SpO2:  [93 %-100 %] 95 %  BP: (121-159)/(60-72) 134/63     Weight: 60 kg (132 lb 4.4 oz)  Body mass index is 22.01 kg/m².  Body surface area is 1.66 meters squared.      Intake/Output Summary (Last 24 hours) at 10/16/2020 0832  Last data filed at 10/16/2020 0200  Gross per 24 hour   Intake 1677.5 ml   Output 700 ml   Net 977.5 ml       Physical Exam  Constitutional:       General: She is not in acute distress.     Appearance: Normal appearance.   HENT:      Head: Normocephalic and atraumatic.   Eyes:      Pupils: Pupils are equal, round, and reactive to light.   Cardiovascular:      Rate and Rhythm: Normal rate and regular rhythm.      Heart sounds: No murmur.   Pulmonary:      Effort: No respiratory distress.      Breath sounds: Normal breath sounds. No wheezing.   Abdominal:      General: Abdomen is flat. Bowel sounds are normal. There is no distension.      Palpations: Abdomen is soft. There is no mass.      Tenderness: There is no abdominal tenderness.   Musculoskeletal:         General: No swelling or deformity.      Right lower leg: Edema present.      Left lower leg: Edema present.   Skin:     Coloration: Skin is not jaundiced.   Neurological:      General: No focal deficit present.      Mental Status: She is alert and oriented to person, place, and time. Mental status is at baseline.         Significant Labs:   CBC:   Recent Labs   Lab 10/15/20  1324 10/16/20  0336   WBC 4.46 4.93   HGB  9.3* 9.1*   HCT 31.0* 31.2*    213    and CMP:   Recent Labs   Lab 10/15/20  1324 10/16/20  0336    141   K 4.8 4.8   * 115*   CO2 16* 13*   GLU 97 83   BUN 83* 80*   CREATININE 4.8* 4.7*   CALCIUM 7.5* 7.2*   PROT 6.5  --    ALBUMIN 3.1*  --    BILITOT 0.8  --    ALKPHOS 126  --    AST 25  --    ALT 56*  --    ANIONGAP 12 13   EGFRNONAA 8.2* 8.4*       Diagnostic Results:  I have reviewed all pertinent imaging results/findings within the past 24 hours.    Assessment/Plan:     STEFAN (acute kidney injury)  STEFAN on CKD, baseline creatinine 2.0    She received 1L LR, and maintained on gentle hydration at 75ml/hour   - US retroperitoneal showed no hydronephrosis   - FeUrea 54%  - Could be progression of her CKD?  - Nephrology evaluation.       Seizure disorder  Continue keppra 500 mg BID    Anticoagulant long-term use  Continue eliquis 5 mg bid for hx of DVT      Malignant neoplasm of lower lobe of right lung  On  Erlotinib    Follow-up on discharge with Dr. Vazquez    Essential hypertension  Controlled   - Hold losartan in the setting of STEFAN   - Switch to amlodipine 5 mg daily               Everardo Rios MD  Hematology/Oncology  [facility]

## 2020-10-16 NOTE — NURSING
Arrived to room via stretcher. AAO x's 4. Verbal obeys command. Respirations even, unlabored. Skin warm and dry. Denies pain. Denies nausea. Plan of care discussed. Bed in lowest position, wheels locked, side rails up x's 2, call light in reach. Instructed to call for assistance as needed, verbalizes understanding. Will continue to monitor.

## 2020-10-16 NOTE — SUBJECTIVE & OBJECTIVE
Past Medical History:   Diagnosis Date    Anticoagulant long-term use     Blood clot in vein 06/2016    Breast cancer 1994    Cataract     Hypertension     Lung cancer     Lung cancer metastatic to brain     Pancreatic cancer 1998    Posterior capsular opacification, left eye     Psychiatric problem     Seizures 01/25/2016    Stroke     Therapy        Past Surgical History:   Procedure Laterality Date    BONE BIOSPY  3/9/16    BRAIN SURGERY  1/12/16    tumor removal 1/12/16    BREAST LUMPECTOMY  1998    left    CATARACT EXTRACTION Bilateral 2004    yag OU    CHOLECYSTECTOMY  1998    COLONOSCOPY N/A 11/13/2015    Procedure: COLONOSCOPY;  Surgeon: Alex Carmona MD;  Location: [facility] ENDO (4TH FLR);  Service: Endoscopy;  Laterality: N/A;  2 year f/u    COLONOSCOPY N/A 11/7/2018    Procedure: COLONOSCOPY;  Surgeon: Jim Raman MD;  Location: [facility] ENDO (4TH FLR);  Service: Endoscopy;  Laterality: N/A;  Eliquis - per Dr. George santiago to hold Eliquis x 2 days prior to colon- ERW    COLONOSCOPY N/A 6/2/2020    Procedure: COLONOSCOPY;  Surgeon: Darius Medina MD;  Location: [facility] SAROJ (2ND FLR);  Service: Endoscopy;  Laterality: N/A;  Schedule tomorrow  patient rescheduled due to poor bowel prep-BB  rapid covid test completed on 6/1/20 per ROQUE Zabala, email sent to Endo Staff-BB  updated instructions emailed to patient-BB  Latex allergy  approval to hold Eliquis received, see telephone encounter 5/18/20-BB      cysto and right ureteral stent  4/27/12    ecoli  2010    removal of blockage, done throat, then through the liver.    ESOPHAGOGASTRODUODENOSCOPY N/A 6/2/2020    Procedure: EGD (ESOPHAGOGASTRODUODENOSCOPY);  Surgeon: Darius Medina MD;  Location: [facility] SAROJ (2ND FLR);  Service: Endoscopy;  Laterality: N/A;  Schedule tomorrow  patient rescheduled due to poor bowel prep-BB  rapid covid test completed on 6/1/20 per ROQUE Zabala, email sent to Endo Staff-BB  updated instructions emailed to  patient-BB  Latex allergy  approval to hold Eliquis received, see telephone     HYSTERECTOMY  1974    KIDNEY STONE SURGERY  --laser    left eswl  12    OOPHORECTOMY  2012    Laparoscopic BSO, lysis of adhesions, cystoscopy greater than 35mins     WHIPPLE PROCEDURE W/ LAPAROSCOPY         Review of patient's allergies indicates:   Allergen Reactions    Tobradex [tobramycin-dexamethasone] Swelling    Iodinated contrast media Hives    Latex Rash and Hives    Phenytoin sodium extended Other (See Comments) and Rash     Current Facility-Administered Medications   Medication Frequency    0.9%  NaCl infusion Continuous    amitriptyline tablet 50 mg QHS    amLODIPine tablet 5 mg Daily    apixaban tablet 5 mg BID    calcium carbonate (OS-UNIQUE) tablet 500 mg Daily    dextrose 50% injection 12.5 g PRN    dextrose 50% injection 25 g PRN    glucagon (human recombinant) injection 1 mg PRN    glucose chewable tablet 16 g PRN    glucose chewable tablet 24 g PRN    levETIRAcetam tablet 500 mg BID    lipase-protease-amylase 12,000-38,000-60,000 units per capsule 1 capsule TID WM    LORazepam tablet 1 mg Q12H PRN    oxybutynin 24 hr tablet 5 mg Daily    sodium chloride 0.9% flush 10 mL PRN     Family History     Problem Relation (Age of Onset)    Breast cancer Mother    Leukemia Paternal Grandfather    Lung cancer Mother    Stomach cancer Paternal Grandmother        Tobacco Use    Smoking status: Former Smoker     Packs/day: 0.00     Years: 0.00     Pack years: 0.00     Quit date: 1961     Years since quittin.0    Smokeless tobacco: Never Used   Substance and Sexual Activity    Alcohol use: No    Drug use: No    Sexual activity: Not Currently     Partners: Male     Birth control/protection: See Surgical Hx     Review of Systems   Constitutional: Negative for chills and fever.   HENT: Negative for hearing loss, postnasal drip, rhinorrhea, sore throat and tinnitus.    Respiratory:  Negative for cough and shortness of breath.    Cardiovascular: Negative for chest pain and leg swelling.   Gastrointestinal: Negative for abdominal distention, abdominal pain, blood in stool, constipation, diarrhea, nausea and vomiting.   Genitourinary: Positive for dysuria. Negative for flank pain and hematuria.   Musculoskeletal: Negative for back pain.   Skin: Negative for rash.   Neurological: Negative for syncope, weakness and headaches.   Hematological: Does not bruise/bleed easily.     Objective:     Vital Signs (Most Recent):  Temp: 97.5 °F (36.4 °C) (10/16/20 1122)  Pulse: 65 (10/16/20 1122)  Resp: 16 (10/16/20 1122)  BP: 139/63 (10/16/20 1122)  SpO2: (!) 93 % (10/16/20 1122)  O2 Device (Oxygen Therapy): room air (10/16/20 1122) Vital Signs (24h Range):  Temp:  [97.5 °F (36.4 °C)-98.4 °F (36.9 °C)] 97.5 °F (36.4 °C)  Pulse:  [56-70] 65  Resp:  [15-20] 16  SpO2:  [93 %-100 %] 93 %  BP: (121-159)/(60-72) 139/63     Weight: 60 kg (132 lb 4.4 oz) (10/15/20 1919)  Body mass index is 22.01 kg/m².  Body surface area is 1.66 meters squared.    I/O last 3 completed shifts:  In: 1677.5 [P.O.:240; I.V.:437.5; IV Piggyback:1000]  Out: 700 [Urine:700]    Physical Exam  Constitutional:       Appearance: Normal appearance.   HENT:      Head: Normocephalic.      Mouth/Throat:      Mouth: Mucous membranes are moist.   Eyes:      Extraocular Movements: Extraocular movements intact.   Cardiovascular:      Rate and Rhythm: Normal rate and regular rhythm.      Pulses: Normal pulses.      Heart sounds: Normal heart sounds.   Pulmonary:      Effort: No respiratory distress.      Breath sounds: Normal breath sounds. No wheezing or rales.   Abdominal:      General: Abdomen is flat. There is no distension.      Palpations: Abdomen is soft.      Tenderness: There is no abdominal tenderness. There is no guarding or rebound.   Skin:     Capillary Refill: Capillary refill takes less than 2 seconds.   Neurological:      General: No focal  deficit present.      Mental Status: She is alert and oriented to person, place, and time. Mental status is at baseline.         Significant Labs:  ABGs: No results for input(s): PH, PCO2, HCO3, POCSATURATED, BE in the last 168 hours.  BMP:   Recent Labs   Lab 10/16/20  0336   GLU 83   *   CO2 13*   BUN 80*   CREATININE 4.7*   CALCIUM 7.2*     Cardiac Markers: No results for input(s): CKMB, TROPONINT, MYOGLOBIN in the last 168 hours.  CBC:   Recent Labs   Lab 10/16/20  0336   WBC 4.93   RBC 3.29*   HGB 9.1*   HCT 31.2*      MCV 95   MCH 27.7   MCHC 29.2*     CMP:   Recent Labs   Lab 10/15/20  1324 10/16/20  0336   GLU 97 83   CALCIUM 7.5* 7.2*   ALBUMIN 3.1*  --    PROT 6.5  --     141   K 4.8 4.8   CO2 16* 13*   * 115*   BUN 83* 80*   CREATININE 4.8* 4.7*   ALKPHOS 126  --    ALT 56*  --    AST 25  --    BILITOT 0.8  --      LFTs:   Recent Labs   Lab 10/15/20  1324   ALT 56*   AST 25   ALKPHOS 126   BILITOT 0.8   PROT 6.5   ALBUMIN 3.1*     PTH: No results for input(s): PTH in the last 168 hours.  TSH: No results for input(s): TSH in the last 168 hours.  All labs within the past 24 hours have been reviewed.    Significant Imaging:  Labs: Reviewed

## 2020-10-17 NOTE — DISCHARGE SUMMARY
[facility]  Hematology/Oncology  Discharge Summary      Patient Name: Naty St  MRN: [id number]  Admission Date: 10/15/2020  Hospital Length of Stay: 0 days  Discharge Date and Time:  10/17/2020 9:27 AM  Attending Physician: Luz Marina Huerta MD   Discharging Provider: Fariba Monreal MD  Primary Care Provider: Olvin Mars MD    HPI: Mrs. St, 75 years old female patient with history of HTN, DVT, metastatic R lung adenocarcinoma on on Tarceva, breast ca s/p L lumpectomy, meningioma s/p resection, pancreatic ca s/p whipple, and s/p hysterectomy w/ b/l salpingo-oophorectomy, HTN who presented to ER with abnormal lab concerning of elevated creatinine.     She reports feeling more fatigued, and tired than usual in the last week. She has noted episodes of mild dizziness. Her oral hydration has decreased as per her son, but still maintains fair appetite. She had routine labs today which showed creatinine of 4.8 (baseline 2.0). She was instructed by Dr. Vazquez to go to ER. She denies fever, chills, headaches, worsening cough, abdominal pain, or dysuria. She denies diarrhea. She has 1x NBNB vomiting during last week. In the ER, she received 1L of LR. UA negative for infection. She is placed in observation for STEFAN     Oncology history (from Dr. Vazquez's notes)   Past history of pancreatic cancer, breast cancer and meningioma.     2016  -- 01/12/2016: meningioma resection done in the past; however, recently, underwent neurosurgical resection with Dr. Ferrera. Pathology from that revealed malignant neoplasm with multiple features pointing towards metastatic papillary serous adenocarcinoma. Additional immunohistochemical stains were performed which revealed the tumor cells to be are positive for TTF1 and negative for ER and GCDFP. The morphology and TTF1 positivity are most consistent with lung primary.    -- Late 01/2016: imaging revealed a mass in the lung at 2 cm in the medial aspect of  "the apical segment of the right upper lobe abutting the mediastinum at the level of the azygous vein and abutting and possibly encasing the segmental bronchi and vessels of the apical segment of the right upper lobe and no pleural fluid was present. Also, there is an enlarged right paratracheal lymph node. No evidence of any metastatic disease at the pancreatic site with postoperative changes post Whipple disease  -- 2/15/16: PET scan reveal "Hypermetabolic mass in the right lung apex consistent with a primary malignancy. Hypermetabolic mediastinal lymph nodes consistent with metastatic disease. Right sacral hypermetabolic lesion consistent with metastatic disease, noting additional mildly sclerotic lesions in multiple vertebral bodies which do not demonstrate abnormal hypermetabolism. She underwent IR bone biopsy which revealed metastatic adenocarcinoma of lung origin. 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  -- March 2016: She has completed focal RT to brain lesions  -- 6/6/16: PET scan shows "Dramatic almost complete response to therapy."  -- 6/7/16: DVT on US. Lovenox started. She is on Tarceva.   -- 12/6/16: PET scan reveals "Stable right upper lobe lesion and right hilar lymph node. No new lesions identified. Trace left pleural effusion".    2017  -- 1/31/17: PET - "Right upper lobe nodule and right hilar lymph node similar and very low grade activity.  There is no definite evidence of recurrence."   -- 11/9/17: PET "In this patient with history of lung cancer, there is interval increase in size and hypermetabolism of a right upper lobe lung lesion concerning for recurrent disease. Additionally, there is interval appearance of a hypermetabolic paratracheal lymph node suspicious for metastatic disease"   -- 12/5/2017: She progressed on Tarceva She underwent EBUS. Pathology revealed adenocarcinoma but T790m could not be done as quantity was not insufficient.     2018  -- 1/30/18: PET scan revealed The patient's " "previously identified abnormal lesions is slightly greater uptake of FDG on today's study compared with prior exam. These findings are concerning for progression of disease"  -- 04/ 2018: She discontinued Tagrisso  -- 6/4/18: restaging scans revealed "Stable appearance of a spiculated right upper lobe pulmonary lesion.  Additional irregular focus superior to the lesion in the right lung apex is stable and may represent scar or additional lesion; although this was not hypermetabolic on prior PET-CT.  No new pulmonary lesions. 1.3 cm subcarinal lymph node, not hypermetabolic on prior PET-CT. Stable postsurgical changes of a Whipple procedure. Bilateral nonobstructing nephrolithiasis.  Stable sclerotic focus in the T5 vertebral body, possibly a bone island as this was not hypermetabolic on prior PET-CT. Small hiatal hernia. RECIST SUMMARY: Date of prior examination for comparison 01/30/2018 Lesion 1: Right upper lobe 1.3 cm Series 2 image 31 prior measurement 1.3 cm"  -- 6/4/18: Bone scan revealed no evidence of mets.   -- 7/11/18: PET scan revealed "Right suprahilar lesion SUV max 3.76, previously 6.86. Pretracheal lymph node SUV max 2.55, previously 3.79. There is physiologic intracranial, head, and neck activity.  There is muscle activation.  There is vocal cord activation.  There is physiologic liver, spleen, GI and  activity.  There is a hiatal hernia.  Pelvic organs show nothing unusual.  No bone lesions are seen.  There are areas of benign appearing lung scar"    She has been on Tarceva since 6/5/18. She has now completed SBRT to her right lung lesions.          * No surgery found *     Hospital Course: 10/16: maintained on gentle hydration. FeUrea calculated 52%, indicating intrinsic etiology. Us retroperitoneal showed no hydronephrosis. Creatinine remains elevated at 4.7  10/17: Patient seen by nephrology. Recommended to continue NS fluids. Cr improved to 4.0 on BMP. Will D/C patient home with nephrology " follow-up. Will hold Tarceva and Losartan for now.     Consults:   Consults (From admission, onward)        Status Ordering Provider     Inpatient consult to Nephrology  Once     Provider:  (Not yet assigned)    KAREN Simon     IP consult to case management  Once     Provider:  (Not yet assigned)    MARILY Mccoy        Interval History: NAEON. Patient remained hemodynamically stable. Her Cr improved to 4.0 on BMP this morning. Her calcium was 6.7. CMP added on to labs and ionized calcium ordered. Will replace if needed. Plan for discharge home today with follow up in nephrology clinic.     Oncology Treatment Plan:   [No treatment plan]    Medications:  Continuous Infusions:  Scheduled Meds:   amitriptyline  50 mg Oral QHS    amLODIPine  5 mg Oral Daily    apixaban  5 mg Oral BID    calcium carbonate  1,000 mg Oral Daily    levETIRAcetam  500 mg Oral BID    lipase-protease-amylase 12,000-38,000-60,000 units  1 capsule Oral TID WM    oxybutynin  5 mg Oral Daily    sodium bicarbonate  650 mg Oral TID     PRN Meds:dextrose 50%, dextrose 50%, glucagon (human recombinant), glucose, glucose, LORazepam, sodium chloride 0.9%     Review of Systems   Constitutional: Negative for appetite change, chills and fever.   HENT: Negative for congestion and trouble swallowing.    Eyes: Negative for pain and redness.   Respiratory: Negative for cough and shortness of breath.    Cardiovascular: Negative for chest pain and palpitations.   Gastrointestinal: Negative for abdominal pain, constipation, diarrhea, nausea and vomiting.   Genitourinary: Negative for difficulty urinating and dysuria.   Musculoskeletal: Negative for back pain and neck pain.   Skin: Negative for rash.   Neurological: Negative for dizziness, light-headedness and headaches.     Objective:     Vital Signs (Most Recent):  Temp: 97.7 °F (36.5 °C) (10/17/20 0818)  Pulse: 66 (10/17/20 0818)  Resp: 16 (10/17/20 0818)  BP: 139/67  (10/17/20 0818)  SpO2: 95 % (10/17/20 0818) Vital Signs (24h Range):  Temp:  [97.5 °F (36.4 °C)-98.2 °F (36.8 °C)] 97.7 °F (36.5 °C)  Pulse:  [65-76] 66  Resp:  [16-17] 16  SpO2:  [93 %-100 %] 95 %  BP: (113-139)/(59-74) 139/67     Weight: 60 kg (132 lb 4.4 oz)  Body mass index is 22.01 kg/m².  Body surface area is 1.66 meters squared.      Intake/Output Summary (Last 24 hours) at 10/17/2020 0856  Last data filed at 10/17/2020 0400  Gross per 24 hour   Intake 3210 ml   Output 2250 ml   Net 960 ml       Physical Exam  Constitutional:       Appearance: She is ill-appearing.   Eyes:      Conjunctiva/sclera: Conjunctivae normal.      Pupils: Pupils are equal, round, and reactive to light.   Cardiovascular:      Rate and Rhythm: Normal rate and regular rhythm.      Pulses: Normal pulses.      Heart sounds: Normal heart sounds.   Pulmonary:      Effort: Pulmonary effort is normal. No respiratory distress.      Breath sounds: Normal breath sounds.   Abdominal:      General: Bowel sounds are normal. There is no distension.      Palpations: Abdomen is soft.      Tenderness: There is no abdominal tenderness.   Skin:     General: Skin is warm and dry.      Findings: No bruising or rash.   Neurological:      Mental Status: She is alert and oriented to person, place, and time.         Significant Labs:   CBC:   Recent Labs   Lab 10/15/20  1324 10/16/20  0336 10/17/20  0435   WBC 4.46 4.93 4.56   HGB 9.3* 9.1* 7.9*   HCT 31.0* 31.2* 26.3*    213 211    and CMP:   Recent Labs   Lab 10/15/20  1324 10/16/20  0336 10/16/20  1607 10/17/20  0435    141 143 141  141   K 4.8 4.8 4.2 4.1  4.1   * 115* 117* 118*  118*   CO2 16* 13* 14* 16*  16*   GLU 97 83 105 131*  131*   BUN 83* 80* 74* 67*  67*   CREATININE 4.8* 4.7* 4.4* 4.0*  4.0*   CALCIUM 7.5* 7.2* 7.2* 6.7*  6.7*   PROT 6.5  --   --  4.9*   ALBUMIN 3.1*  --   --  2.3*   BILITOT 0.8  --   --  0.3   ALKPHOS 126  --   --  94   AST 25  --   --  20   ALT 56*   --   --  32   ANIONGAP 12 13 12 7*  7*   EGFRNONAA 8.2* 8.4* 9.1* 10.3*  10.3*       Diagnostic Results:  I have reviewed all pertinent imaging results/findings within the past 24 hours.        Pending Diagnostic Studies:     None        Final Active Diagnoses:    Diagnosis Date Noted POA    STEFAN (acute kidney injury) [N17.9] 10/15/2020 Yes    Seizure disorder [G40.909] 04/09/2018 Yes    Anticoagulant long-term use [Z79.01] 11/27/2017 Not Applicable    Malignant neoplasm of lower lobe of right lung [C34.31] 03/17/2016 Yes    Essential hypertension [I10] 08/25/2014 Yes      Problems Resolved During this Admission:      Assessment/Plan:      STEFAN (acute kidney injury)  STEFAN on CKD, baseline creatinine 2.0  She received 1L LR, and maintained on gentle hydration at 75ml/hour   - US retroperitoneal showed no hydronephrosis   - FeUrea 54%  - Could be progression of her CKD?  - Nephrology evaluation. Patient will need follow up in the nephrology clinic on discharge  - Tarceva held. Continue holding on discharge.         Seizure disorder  Continue keppra 500 mg BID     Anticoagulant long-term use  Continue eliquis 5 mg bid for hx of DVT        Malignant neoplasm of lower lobe of right lung  On  Erlotinib    Follow-up on discharge with Dr. Vazquez     Essential hypertension  Hold losartan in the setting of STEFAN   Switch to amlodipine 5 mg daily    Discharged Condition: stable    Disposition: Home or Self Care    Follow Up:    Patient Instructions:   No discharge procedures on file.  Medications:  Reconciled Home Medications:      Medication List      START taking these medications    amLODIPine 5 MG tablet  Commonly known as: NORVASC  Take 1 tablet (5 mg total) by mouth once daily.  Start taking on: October 18, 2020        CONTINUE taking these medications    albuterol-ipratropium 2.5 mg-0.5 mg/3 mL nebulizer solution  Commonly known as: DUO-NEB  Take 3 mLs by nebulization every 6 (six) hours as needed for Wheezing or  Shortness of Breath. Rescue     amitriptyline 50 MG tablet  Commonly known as: ELAVIL  TAKE ONE TABLET BY MOUTH EVERY EVENING     ammonium lactate 12 % lotion  Commonly known as: LAC-HYDRIN  Apply topically as needed for Dry Skin.     calcium carbonate 500 mg calcium (1,250 mg) tablet  Commonly known as: OS-UNIQUE  Take 2 tablets (1,000 mg total) by mouth once daily.     clindamycin phosphate 1% 1 % gel  Commonly known as: CLINDAGEL  APPLY TOPICALLY TWICE DAILY; apply to back area     CREON Cpdr  Generic drug: lipase-protease-amylase 12,000-38,000-60,000 units  TAKE ONE CAPSULE BY MOUTH THREE TIMES A DAY WITH MEALS     denosumab 120 mg/1.7 mL (70 mg/mL) Soln  Commonly known as: XGEVA  Inject 120 mg into the skin every 28 days.     doxycycline 100 MG tablet  Commonly known as: VIBRA-TABS  TAKE ONE TABLET BY MOUTH EVERY TWELVE HOURS     ELIQUIS 5 mg Tab  Generic drug: apixaban  TAKE ONE TABLET BY MOUTH TWICE DAILY     ketoconazole 2 % cream  Commonly known as: NIZORAL  Apply topically once daily.     levETIRAcetam 500 MG Tab  Commonly known as: KEPPRA  Take 1 tablet (500 mg total) by mouth 2 (two) times daily. NO FURTHER REFILLS WITHOUT APPOINTMENT     LORazepam 1 MG tablet  Commonly known as: ATIVAN  TAKE ONE TABLET (1mg) BY MOUTH TWICE DAILY     mesalamine 1000 MG Supp  Commonly known as: CANASA  Place 1 suppository (1,000 mg total) rectally nightly.     multivitamin per tablet  Commonly known as: THERAGRAN  Take 1 tablet by mouth once daily.     MYRBETRIQ 50 mg Tb24  Generic drug: mirabegron  TAKE ONE TABLET BY MOUTH ONCE DAILY     pantoprazole 40 MG tablet  Commonly known as: PROTONIX  Take 1 tablet (40 mg total) by mouth before breakfast.     polyethylene glycol 17 gram/dose powder  Commonly known as: GLYCOLAX     senna-docusate 8.6-50 mg 8.6-50 mg per tablet  Commonly known as: SENNA LAXATIVE-STOOL SOFTENER  Take 1 tablet by mouth once daily.        STOP taking these medications    furosemide 40 MG tablet  Commonly  known as: LASIX     losartan 50 MG tablet  Commonly known as: COZAAR     metoprolol succinate 50 MG 24 hr tablet  Commonly known as: TOPROL-XL     TARCEVA 150 MG tablet  Generic drug: erlotinib            Fariba Monreal MD  Hematology/Oncology  [facility]

## 2020-10-17 NOTE — ASSESSMENT & PLAN NOTE
STEFAN on CKD, baseline creatinine 2.0    She received 1L LR, and maintained on gentle hydration at 75ml/hour   - US retroperitoneal showed no hydronephrosis   - FeUrea 54%  - Could be progression of her CKD?  - Nephrology evaluation. Patient will need follow up in the nephrology clinic on discharge  - Tarceva held. Continue holding on discharge.

## 2020-10-17 NOTE — ASSESSMENT & PLAN NOTE
Controlled   - Hold losartan in the setting of STEFAN. Continue holding losartan on discharge.   - Switch to amlodipine 5 mg daily

## 2020-10-17 NOTE — SUBJECTIVE & OBJECTIVE
Interval History: NAEON. Patient remained hemodynamically stable. Her Cr improved to 4.0 on BMP this morning. Her calcium was 6.7. CMP added on to labs and ionized calcium ordered. Will replace if needed. Plan for discharge home today with follow up in nephrology clinic.     Oncology Treatment Plan:   [No treatment plan]    Medications:  Continuous Infusions:  Scheduled Meds:   amitriptyline  50 mg Oral QHS    amLODIPine  5 mg Oral Daily    apixaban  5 mg Oral BID    calcium carbonate  1,000 mg Oral Daily    levETIRAcetam  500 mg Oral BID    lipase-protease-amylase 12,000-38,000-60,000 units  1 capsule Oral TID WM    oxybutynin  5 mg Oral Daily    sodium bicarbonate  650 mg Oral TID     PRN Meds:dextrose 50%, dextrose 50%, glucagon (human recombinant), glucose, glucose, LORazepam, sodium chloride 0.9%     Review of Systems   Constitutional: Negative for appetite change, chills and fever.   HENT: Negative for congestion and trouble swallowing.    Eyes: Negative for pain and redness.   Respiratory: Negative for cough and shortness of breath.    Cardiovascular: Negative for chest pain and palpitations.   Gastrointestinal: Negative for abdominal pain, constipation, diarrhea, nausea and vomiting.   Genitourinary: Negative for difficulty urinating and dysuria.   Musculoskeletal: Negative for back pain and neck pain.   Skin: Negative for rash.   Neurological: Negative for dizziness, light-headedness and headaches.     Objective:     Vital Signs (Most Recent):  Temp: 97.7 °F (36.5 °C) (10/17/20 0818)  Pulse: 66 (10/17/20 0818)  Resp: 16 (10/17/20 0818)  BP: 139/67 (10/17/20 0818)  SpO2: 95 % (10/17/20 0818) Vital Signs (24h Range):  Temp:  [97.5 °F (36.4 °C)-98.2 °F (36.8 °C)] 97.7 °F (36.5 °C)  Pulse:  [65-76] 66  Resp:  [16-17] 16  SpO2:  [93 %-100 %] 95 %  BP: (113-139)/(59-74) 139/67     Weight: 60 kg (132 lb 4.4 oz)  Body mass index is 22.01 kg/m².  Body surface area is 1.66 meters squared.      Intake/Output  Summary (Last 24 hours) at 10/17/2020 0856  Last data filed at 10/17/2020 0400  Gross per 24 hour   Intake 3210 ml   Output 2250 ml   Net 960 ml       Physical Exam  Constitutional:       Appearance: She is ill-appearing.   Eyes:      Conjunctiva/sclera: Conjunctivae normal.      Pupils: Pupils are equal, round, and reactive to light.   Cardiovascular:      Rate and Rhythm: Normal rate and regular rhythm.      Pulses: Normal pulses.      Heart sounds: Normal heart sounds.   Pulmonary:      Effort: Pulmonary effort is normal. No respiratory distress.      Breath sounds: Normal breath sounds.   Abdominal:      General: Bowel sounds are normal. There is no distension.      Palpations: Abdomen is soft.      Tenderness: There is no abdominal tenderness.   Skin:     General: Skin is warm and dry.      Findings: No bruising or rash.   Neurological:      Mental Status: She is alert and oriented to person, place, and time.         Significant Labs:   CBC:   Recent Labs   Lab 10/15/20  1324 10/16/20  0336 10/17/20  0435   WBC 4.46 4.93 4.56   HGB 9.3* 9.1* 7.9*   HCT 31.0* 31.2* 26.3*    213 211    and CMP:   Recent Labs   Lab 10/15/20  1324 10/16/20  0336 10/16/20  1607 10/17/20  0435    141 143 141  141   K 4.8 4.8 4.2 4.1  4.1   * 115* 117* 118*  118*   CO2 16* 13* 14* 16*  16*   GLU 97 83 105 131*  131*   BUN 83* 80* 74* 67*  67*   CREATININE 4.8* 4.7* 4.4* 4.0*  4.0*   CALCIUM 7.5* 7.2* 7.2* 6.7*  6.7*   PROT 6.5  --   --  4.9*   ALBUMIN 3.1*  --   --  2.3*   BILITOT 0.8  --   --  0.3   ALKPHOS 126  --   --  94   AST 25  --   --  20   ALT 56*  --   --  32   ANIONGAP 12 13 12 7*  7*   EGFRNONAA 8.2* 8.4* 9.1* 10.3*  10.3*       Diagnostic Results:  I have reviewed all pertinent imaging results/findings within the past 24 hours.

## 2020-10-17 NOTE — PLAN OF CARE
Plan of care discussed at start of shift. Bed alarm in use. Resting with eyes closed. Respirations even, unlabored. Skin warm and dry. Denies pain. Denies nausea. Frequent checks for pain and safety maintained. Ambulating to bathroom with assistance. Bed in lowest position, wheels locked, side rails up x's 2, call light in reach. Instructed to call for assistance as needed, verbalizes understanding. Will continue to monitor.

## 2020-10-17 NOTE — PROGRESS NOTES
[facility]  Nephrology  Progress Note    Date of Admit: 10/15/2020      Chief Complaint/Reason for Consult     Abnormal Lab (elevated kidney function, last chemo today for lung cancer)      Subjective:      History of Present Illness:  Naty St is a 75 y.o. woman  who  has a past medical history of Anticoagulant long-term use, Blood clot in vein (06/2016), Breast cancer (1994), Cataract, Hypertension, Lung cancer, Lung cancer metastatic to brain, Pancreatic cancer (1998), Posterior capsular opacification, left eye, Psychiatric problem, Seizures (01/25/2016), Stroke, and Therapy.. The patient presented to [facility] on 10/15/2020 with a primary complaint of Abnormal Lab (elevated kidney function, last chemo today for lung cancer)      Interval history patient seen and examined at bedside,  at bedside and daughter on the phone on speaker ,   Patient stated she feels better than when she came in , no SOB,N/V   Ca low , added ionized ca and it is low   Past Medical History:  Past Medical History:   Diagnosis Date    Anticoagulant long-term use     Blood clot in vein 06/2016    Breast cancer 1994    Cataract     Hypertension     Lung cancer     Lung cancer metastatic to brain     Pancreatic cancer 1998    Posterior capsular opacification, left eye     Psychiatric problem     Seizures 01/25/2016    Stroke     Therapy        Past Surgical History:  Past Surgical History:   Procedure Laterality Date    BONE BIOSPY  3/9/16    BRAIN SURGERY  1/12/16    tumor removal 1/12/16    BREAST LUMPECTOMY  1998    left    CATARACT EXTRACTION Bilateral 2004    yag OU    CHOLECYSTECTOMY  1998    COLONOSCOPY N/A 11/13/2015    Procedure: COLONOSCOPY;  Surgeon: Alex Carmona MD;  Location: [address];  Service: Endoscopy;  Laterality: N/A;  2 year f/u    COLONOSCOPY N/A 11/7/2018    Procedure: COLONOSCOPY;  Surgeon: Jim Raman MD;  Location:  [facility] ENDO (4TH FLR);  Service: Endoscopy;  Laterality: N/A;  Eliquis - per Dr. George santiago to hold Eliquis x 2 days prior to colon- ERW    COLONOSCOPY N/A 6/2/2020    Procedure: COLONOSCOPY;  Surgeon: Darius Medina MD;  Location: [facility] (2ND FLR);  Service: Endoscopy;  Laterality: N/A;  Schedule tomorrow  patient rescheduled due to poor bowel prep-BB  rapid covid test completed on 6/1/20 per ROQUE Zabala, email sent to Endo Staff-BB  updated instructions emailed to patient-BB  Latex allergy  approval to hold Eliquis received, see telephone encounter 5/18/20-BB      cysto and right ureteral stent  4/27/12    ecoli  2010    removal of blockage, done throat, then through the liver.    ESOPHAGOGASTRODUODENOSCOPY N/A 6/2/2020    Procedure: EGD (ESOPHAGOGASTRODUODENOSCOPY);  Surgeon: Darius Medina MD;  Location: [facility] (2ND FLR);  Service: Endoscopy;  Laterality: N/A;  Schedule tomorrow  patient rescheduled due to poor bowel prep-BB  rapid covid test completed on 6/1/20 per ROQUE Zabala, email sent to Endo Staff-BB  updated instructions emailed to patient-BB  Latex allergy  approval to hold Eliquis received, see telephone     HYSTERECTOMY  1974    KIDNEY STONE SURGERY  2010--laser    left eswl  6/20/12    OOPHORECTOMY  4/25/2012    Laparoscopic BSO, lysis of adhesions, cystoscopy greater than 35mins     WHIPPLE PROCEDURE W/ LAPAROSCOPY  1998       Allergies:  Review of patient's allergies indicates:   Allergen Reactions    Tobradex [tobramycin-dexamethasone] Swelling    Iodinated contrast media Hives    Latex Rash and Hives    Phenytoin sodium extended Other (See Comments) and Rash       Home Medications:  Prior to Admission medications    Medication Sig Start Date End Date Taking? Authorizing Provider   amitriptyline (ELAVIL) 50 MG tablet TAKE ONE TABLET BY MOUTH EVERY EVENING 8/23/20  Yes Olvin Mars MD   calcium carbonate (OS-UNIQUE) 500 mg calcium (1,250 mg) tablet Take 2 tablets (1,000 mg total) by  mouth once daily. 1/22/20 1/21/21 Yes Solange Nicholas MD   CREON CpDR TAKE ONE CAPSULE BY MOUTH THREE TIMES A DAY WITH MEALS 3/28/20  Yes Olvin Mars MD   doxycycline (VIBRA-TABS) 100 MG tablet TAKE ONE TABLET BY MOUTH EVERY TWELVE HOURS 9/22/20  Yes Karrie Vazquez MD   ELIQUIS 5 mg Tab TAKE ONE TABLET BY MOUTH TWICE DAILY 8/24/20  Yes Karrie Vazquez MD   erlotinib (TARCEVA) 150 MG tablet Take 1 tablet (150 mg total) by mouth once daily BRAND MEDICALLY NECESSARY. 9/4/20  Yes Karrie Vazquez MD   levETIRAcetam (KEPPRA) 500 MG Tab Take 1 tablet (500 mg total) by mouth 2 (two) times daily. NO FURTHER REFILLS WITHOUT APPOINTMENT 8/4/20  Yes Jean Carlos Rock MD   lipase-protease-amylase 12,000-38,000-60,000 units (CREON) CpDR Take 1 capsule by mouth 3 (three) times daily with meals. 3/28/20  Yes Kiya Landin NP   LORazepam (ATIVAN) 1 MG tablet TAKE ONE TABLET (1mg) BY MOUTH TWICE DAILY 9/22/20  Yes Olvin Mars MD   losartan (COZAAR) 50 MG tablet TAKE ONE TABLET BY MOUTH TWICE DAILY 7/13/20  Yes Minh Mcelroy MD   metoprolol succinate (TOPROL-XL) 50 MG 24 hr tablet TAKE ONE TABLET BY MOUTH ONCE DAILY 5/8/20  Yes Kiya Landin NP   multivitamin (THERAGRAN) per tablet Take 1 tablet by mouth once daily.   Yes Historical Provider   MYRBETRIQ 50 mg Tb24 TAKE ONE TABLET BY MOUTH ONCE DAILY 12/6/19  Yes Olvin Mars MD   albuterol-ipratropium (DUO-NEB) 2.5 mg-0.5 mg/3 mL nebulizer solution Take 3 mLs by nebulization every 6 (six) hours as needed for Wheezing or Shortness of Breath. Rescue 11/13/19 11/12/20  Kiya Landin NP   ammonium lactate (LAC-HYDRIN) 12 % lotion Apply topically as needed for Dry Skin. 9/9/20   Kiya Landin NP   clindamycin phosphate 1% (CLINDAGEL) 1 % gel APPLY TOPICALLY TWICE DAILY; apply to back area 5/12/20   Kiya Landin NP   denosumab (XGEVA) 120 mg/1.7 mL (70 mg/mL) Soln Inject 120 mg into the skin every 28 days.    Historical Provider   furosemide  (LASIX) 40 MG tablet Take 1 tablet (40 mg total) by mouth daily as needed (swelling). 20   Usama Packer MD   ketoconazole (NIZORAL) 2 % cream Apply topically once daily. 20   Maikol Carroll DPM   mesalamine (CANASA) 1000 MG Supp Place 1 suppository (1,000 mg total) rectally nightly.  Patient not taking: Reported on 2020  Darius Medina MD   pantoprazole (PROTONIX) 40 MG tablet Take 1 tablet (40 mg total) by mouth before breakfast. 20  Darius Medina MD   polyethylene glycol (GLYCOLAX) 17 gram/dose powder  18   Historical Provider   senna-docusate 8.6-50 mg (SENNA LAXATIVE-STOOL SOFTENER) 8.6-50 mg per tablet Take 1 tablet by mouth once daily. 18   Frantz Neumann MD       Family History:  Family History   Problem Relation Age of Onset    Breast cancer Mother     Lung cancer Mother     Leukemia Paternal Grandfather     Stomach cancer Paternal Grandmother     Colon cancer Neg Hx     Ovarian cancer Neg Hx        Social History:  Social History     Tobacco Use    Smoking status: Former Smoker     Packs/day: 0.00     Years: 0.00     Pack years: 0.00     Quit date: 1961     Years since quittin.0    Smokeless tobacco: Never Used   Substance Use Topics    Alcohol use: No    Drug use: No       Review of Systems:  Pertinent positives and negatives are listed in HPI.  All other systems are reviewed and are negative.     Objective:   Last 24 Hour Vital Signs:  BP  Min: 113/74  Max: 139/63  Temp  Av.9 °F (36.6 °C)  Min: 97.5 °F (36.4 °C)  Max: 98.2 °F (36.8 °C)  Pulse  Av.3  Min: 56  Max: 76  Resp  Av.2  Min: 15  Max: 17  SpO2  Av.7 %  Min: 93 %  Max: 100 %  Body mass index is 22.01 kg/m².  I/O last 3 completed shifts:  In: 3887.5 [P.O.:1500; I.V.:2387.5]  Out: 2950 [Urine:2950]    Physical Examination:  General: No acute distress  HEENT: EOMI, PERRL, MMM, OP clear and without exudate or erythema  Cardiovascular: NRRR,  without appreciated murmurs or rubs, without extra heart sounds, peripheral pulses 2+ throughout  Chest/Pulmonary: CTABL, normal work of breathing without accessory muscle use,   Abdomen: soft, nontender, nondistended, bowel sounds heard in all four quadrants and normo-active   MSKL: without gross deformity, active FROM  Lymphatic: no appreciated LAD  Skin: without cyanosis or clubbing, without peripheral edema   Neurologic: AAOx3  Access:     Laboratory:  Most Recent Data:  CBC:   Lab Results   Component Value Date    WBC 4.56 10/17/2020    HGB 7.9 (L) 10/17/2020    HCT 26.3 (L) 10/17/2020     10/17/2020    MCV 94 10/17/2020    RDW 17.6 (H) 10/17/2020     BMP:   Lab Results   Component Value Date     10/17/2020     10/17/2020    K 4.1 10/17/2020    K 4.1 10/17/2020     (H) 10/17/2020     (H) 10/17/2020    CO2 16 (L) 10/17/2020    CO2 16 (L) 10/17/2020    BUN 67 (H) 10/17/2020    BUN 67 (H) 10/17/2020    CREATININE 4.0 (H) 10/17/2020    CREATININE 4.0 (H) 10/17/2020     (H) 10/17/2020     (H) 10/17/2020    CALCIUM 6.7 (LL) 10/17/2020    CALCIUM 6.7 (LL) 10/17/2020    MG 1.7 02/19/2020    PHOS 2.6 (L) 02/19/2020     LFTs:   Lab Results   Component Value Date    PROT 4.9 (L) 10/17/2020    ALBUMIN 2.3 (L) 10/17/2020    BILITOT 0.3 10/17/2020    AST 20 10/17/2020    ALKPHOS 94 10/17/2020    ALT 32 10/17/2020     (H) 01/17/2020     Coags:   Lab Results   Component Value Date    INR 1.0 02/24/2020     Urinalysis:   Lab Results   Component Value Date    LABURIN No growth 02/17/2020    COLORU Straw 10/15/2020    SPECGRAV 1.010 10/15/2020    NITRITE Negative 10/15/2020    PROTEINUR n 07/11/2017    KETONESU Negative 10/15/2020    UROBILINOGEN Negative 10/17/2018    BILIRUBINUR n 07/11/2017    WBCUA 0 01/16/2020       Trended Lab Data:  Recent Labs   Lab 10/15/20  1324 10/16/20  0336 10/16/20  1607 10/17/20  0435   WBC 4.46 4.93  --  4.56   HGB 9.3* 9.1*  --  7.9*   HCT 31.0*  31.2*  --  26.3*    213  --  211   MCV 92 95  --  94   RDW 17.9* 17.7*  --  17.6*    141 143 141  141   K 4.8 4.8 4.2 4.1  4.1   * 115* 117* 118*  118*   CO2 16* 13* 14* 16*  16*   BUN 83* 80* 74* 67*  67*   CREATININE 4.8* 4.7* 4.4* 4.0*  4.0*   GLU 97 83 105 131*  131*   PROT 6.5  --   --  4.9*   ALBUMIN 3.1*  --   --  2.3*   BILITOT 0.8  --   --  0.3   AST 25  --   --  20   ALKPHOS 126  --   --  94   ALT 56*  --   --  32       Trended Cardiac Data:  No results for input(s): TROPONINI, CKTOTAL, CKMB, BNP in the last 168 hours.           Assessment and Plan:       #STEFAN (acute kidney injury)  75 year old with PMHx of HTN presenting for STEFAN w/ Cr 4.8, baseline 2.0  -Retroperitoneal US- medical renal disease. No obstruction  FeUrea 51.4% - consistent with intrinsic renal disease. However, elevated FeUrea may be less diagnostic due to patients age.   -Post 1 LR, Cr minimally improved to 4.7  -not fluid overloaded  With good UOP .   -Likely etiology ATN vs Pre-renal ( will spin urine )     -Erlotinib can cause STEFAN and given patient has been on this medication in the past, may be possible etiology.   Likely etiology of STEFAN may be pre-renal and renal injury 2/2 erlotinib.( some case report about Tarceva , however urine is bland so unlikely )      # Hypocalcemia : with low ionized Ca ,  -replace vit D .     Recommendations:  -Hold nephrotoxic medications.   -MAP goal >65  -Transfuse for Hgb <7  -Recommend holding losartan in the setting of kidney insult.

## 2020-10-18 NOTE — PLAN OF CARE
Side rails up x2; call bell in place; bed in lowest, locked position; skid proof socks on; no evidence of skin breakdown; care plan explained to patient; pt remains free of injury.  Calcium gluconate given.  Pt tolerated po, voids, ambulates in room, denies pain, n/v or diarrhea. Pt with d/c home orders, instructions, appts, medications and prescription reviewed pt verbalized understanding. Iv d/cd dressing applied, escort ordered for transportation via w/c. VSS and afebrile.

## 2020-10-19 NOTE — TELEPHONE ENCOUNTER
I will need to see her on Wednesday because I need to discuss scans,  She will need to hold Tarceva until kidney numbers come down,  Let's do labs, urgent visit for fluids today see Karena today

## 2020-10-19 NOTE — TELEPHONE ENCOUNTER
"----- Message from Surya Cheatham sent at 10/19/2020 10:10 AM CDT -----  Consult/Advisory:    Name Of Caller: Naty   Contact Preference?: [phone]    What is the nature of the call?:  Pt states she was released from hospital on Saturday and was taken off cancer meds and would like a callback to discuss     Additional Notes:  "Thank you for all that you do for our patients'"           "

## 2020-10-19 NOTE — TELEPHONE ENCOUNTER
Spoke with patient.  Offered her appointments for today.  She declined, as she has no way of getting to appointment.  Scheduled her for tomorrow.

## 2020-10-19 NOTE — TELEPHONE ENCOUNTER
Spoke with patient. She notes feels dizzy since yesterday. Her BP/HR is 113/76, 94 today. He notes dizziness at rest and with position changes. She was just dc'd from the hospital on Saturday. Is scheduled to see yovany with labs on Wednesday with labs.      Message routed to dr sullivan (want to get labs today?)

## 2020-10-21 PROBLEM — R53.0 NEOPLASTIC MALIGNANT RELATED FATIGUE: Status: ACTIVE | Noted: 2020-01-01

## 2020-10-21 NOTE — PROGRESS NOTES
Subjective:       Patient ID: Naty St is a 75 y.o. female.    Chief Complaint: Results and Nausea  STEFAN, weakness    History:  Past Medical History:   Diagnosis Date    Anticoagulant long-term use     Blood clot in vein 06/2016    Breast cancer 1994    Cataract     Hypertension     Lung cancer     Lung cancer metastatic to brain     Pancreatic cancer 1998    Posterior capsular opacification, left eye     Psychiatric problem     Seizures 01/25/2016    Stroke     Therapy      Past Surgical History:   Procedure Laterality Date    BONE BIOSPY  3/9/16    BRAIN SURGERY  1/12/16    tumor removal 1/12/16    BREAST LUMPECTOMY  1998    left    CATARACT EXTRACTION Bilateral 2004    yag OU    CHOLECYSTECTOMY  1998    COLONOSCOPY N/A 11/13/2015    Procedure: COLONOSCOPY;  Surgeon: Alex Carmona MD;  Location: [facility] ENDO (4TH FLR);  Service: Endoscopy;  Laterality: N/A;  2 year f/u    COLONOSCOPY N/A 11/7/2018    Procedure: COLONOSCOPY;  Surgeon: Jim Raman MD;  Location: [facility] ENDO (4TH FLR);  Service: Endoscopy;  Laterality: N/A;  Eliquis - per Dr. George santiago to hold Eliquis x 2 days prior to colon- ERW    COLONOSCOPY N/A 6/2/2020    Procedure: COLONOSCOPY;  Surgeon: Darius Medina MD;  Location: [facility] ENDO (2ND FLR);  Service: Endoscopy;  Laterality: N/A;  Schedule tomorrow  patient rescheduled due to poor bowel prep-BB  rapid covid test completed on 6/1/20 per ROQUE Zabala, email sent to Endo Staff-BB  updated instructions emailed to patient-BB  Latex allergy  approval to hold Eliquis received, see telephone encounter 5/18/20-BB      cysto and right ureteral stent  4/27/12    ecoli  2010    removal of blockage, done throat, then through the liver.    ESOPHAGOGASTRODUODENOSCOPY N/A 6/2/2020    Procedure: EGD (ESOPHAGOGASTRODUODENOSCOPY);  Surgeon: Darius Medina MD;  Location: [facility] ENDO (2ND FLR);  Service: Endoscopy;  Laterality: N/A;  Schedule tomorrow  patient rescheduled due to  poor bowel prep-BB  rapid covid test completed on 6/1/20 per ROQUE Zabala, email sent to Endo Staff-BB  updated instructions emailed to patient-BB  Latex allergy  approval to hold Eliquis received, see telephone     HYSTERECTOMY  1974    KIDNEY STONE SURGERY  2010--laser    left eswl  6/20/12    OOPHORECTOMY  4/25/2012    Laparoscopic BSO, lysis of adhesions, cystoscopy greater than 35mins     WHIPPLE PROCEDURE W/ LAPAROSCOPY  1998      Oncology History   Secondary malignant neoplasm of intrathoracic lymph nodes   1/29/2019 Initial Diagnosis    Secondary malignant neoplasm of intrathoracic lymph nodes      Radiation Therapy    Treating physician: Kristopher Ferrera        Site  Modality  Energy  Dose/Fx (Gy)  #Fx  Total Dose (Gy)  Start Date  End Date    Mediastinum  SBRT 6X  7  7/7  49 2/12/2019 2/21/2019                Allergies:  Review of patient's allergies indicates:   Allergen Reactions    Tobradex [tobramycin-dexamethasone] Swelling    Iodinated contrast media Hives    Latex Rash and Hives    Phenytoin sodium extended Other (See Comments) and Rash        HPI MS. St is a very pleasant 74 y/o female that presents to the Urgent Care clinic for hospital f/u and recent report of dizziness. She is currently a patient of Dr. Carlton that is receiving treatment for STG IV lung ca. She is currently on Tarceva for therapy. However, her therapy has been held due to a recent hospitalization for acute kidney injury, weakness and fatigue. She was seen and admitted to the hospital on 10/15/2020, wherein her creatinine was 4.8, with a baseline creatinine of 2.0. On that day, she was also feeling weak and fatigued. Hence, she was admitted.  She was given rigorous IVF and her kidney function began to improve. She was discharged on 10/17/2020.   She reported to her nurse over the phone today, that she began to have postural dizziness. She does not report falling, shortness of breath, n/v, hearing loss, headache or changes  to her vision. She is not reporting dizziness within the clinic today. She is ambulating without significant difficulty. She is eating and has a fair appetite.   She is not having any urinary symptoms. Fatigue and weakness have improved since her discharge. She reports drinking water.       ECOG status is 1.       ECOG:  ECOG SCORE            Review of Systems   Constitutional: Positive for fatigue. Negative for appetite change, chills, fever and unexpected weight change.   HENT: Negative for congestion, facial swelling, mouth sores, sinus pressure, sinus pain, sore throat and trouble swallowing.    Eyes: Negative for photophobia, pain and visual disturbance.   Respiratory: Negative for cough, chest tightness, shortness of breath, wheezing and stridor.    Cardiovascular: Negative for chest pain and leg swelling.   Gastrointestinal: Negative for abdominal distention, abdominal pain, blood in stool, constipation, diarrhea, nausea, rectal pain and vomiting.   Genitourinary: Negative for dysuria, flank pain and urgency.   Musculoskeletal: Negative for back pain, gait problem, joint swelling and neck pain.   Skin: Negative for color change and rash.   Neurological: Positive for weakness. Negative for dizziness, syncope, light-headedness, numbness and headaches.   Hematological: Negative for adenopathy. Does not bruise/bleed easily.   Psychiatric/Behavioral: Negative for agitation, behavioral problems and confusion. The patient is not nervous/anxious.        Objective:      Physical Exam  Vitals signs and nursing note reviewed.   Constitutional:       General: She is not in acute distress.     Appearance: Normal appearance. She is well-developed.      Comments: Frail appearing but non-toxic or ill-appearing.    HENT:      Head: Normocephalic and atraumatic.      Right Ear: External ear normal.      Left Ear: External ear normal.   Eyes:      General: No scleral icterus.     Extraocular Movements: Extraocular movements  intact.      Conjunctiva/sclera: Conjunctivae normal.   Neck:      Musculoskeletal: Normal range of motion and neck supple.   Cardiovascular:      Rate and Rhythm: Normal rate and regular rhythm.      Heart sounds: No murmur. No friction rub. No gallop.    Pulmonary:      Effort: Pulmonary effort is normal. No respiratory distress.      Breath sounds: Normal breath sounds. No stridor. No wheezing, rhonchi or rales.   Chest:      Chest wall: No tenderness.   Abdominal:      General: Abdomen is flat. Bowel sounds are normal. There is no distension.      Palpations: Abdomen is soft. There is no mass.      Tenderness: There is no abdominal tenderness. There is no guarding or rebound.   Musculoskeletal: Normal range of motion.         General: No swelling, tenderness or deformity.   Skin:     General: Skin is warm and dry.      Capillary Refill: Capillary refill takes less than 2 seconds.      Findings: No erythema or rash.   Neurological:      Mental Status: She is alert and oriented to person, place, and time.      Gait: Gait is intact.   Psychiatric:         Behavior: Behavior normal.         Thought Content: Thought content normal.         Judgment: Judgment normal.         Results:   Contains abnormal data CBC Oncology  Order: [id number]  Status:  Final result   Visible to patient:  Yes (Patient Portal)   Next appt:  Today at 01:00 PM in Chemotherapy (CHEMO [facility])   Dx:  Adenocarcinoma of lung, right   Ref Range & Units 1d ago   WBC 3.90 - 12.70 K/uL 4.72    RBC 4.00 - 5.40 M/uL 3.19Low     Hemoglobin 12.0 - 16.0 g/dL 9.0Low     Hematocrit 37.0 - 48.5 % 30.1Low     Mean Corpuscular Volume 82 - 98 fL 94    Mean Corpuscular Hemoglobin 27.0 - 31.0 pg 28.2    Mean Corpuscular Hemoglobin Conc 32.0 - 36.0 g/dL 29.9Low     RDW 11.5 - 14.5 % 17.0High     Platelets 150 - 350 K/uL 217    MPV 9.2 - 12.9 fL 9.9    Gran # (ANC) 1.8 - 7.7 K/uL 3.3    Comment: The ANC is based on a white cell differential from an   automated cell  counter. It has not been microscopically   reviewed for the presence of abnormal cells. Clinical   correlation is required.    Immature Grans (Abs) 0.00 - 0.04 K/uL 0.02    Comment: Mild elevation in immature granulocytes is non specific and   can be seen in a variety of conditions including stress response,   acute inflammation, trauma and pregnancy. Correlation with other   laboratory and clinical findings is essential.            Contains abnormal data Comprehensive metabolic panel  Order: [id number]  Status:  Final result   Visible to patient:  Yes (Patient Portal)   Next appt:  Today at 01:00 PM in Chemotherapy (CHEMO [facility])   Dx:  Malignant neoplasm of lower lobe of r...   Ref Range & Units 1d ago   Sodium 136 - 145 mmol/L 140    Potassium 3.5 - 5.1 mmol/L 4.8    Chloride 95 - 110 mmol/L 112High     CO2 23 - 29 mmol/L 17Low     Glucose 70 - 110 mg/dL 91    BUN, Bld 8 - 23 mg/dL 56High     Creatinine 0.5 - 1.4 mg/dL 3.8High     Calcium 8.7 - 10.5 mg/dL 8.3Low     Total Protein 6.0 - 8.4 g/dL 6.1    Albumin 3.5 - 5.2 g/dL 3.1Low     Total Bilirubin 0.1 - 1.0 mg/dL 0.6    Comment: For infants and newborns, interpretation of results should be based   on gestational age, weight and in agreement with clinical   observations.   Premature Infant recommended reference ranges:   Up to 24 hours.............<8.0 mg/dL   Up to 48 hours............<12.0 mg/dL   3-5 days..................<15.0 mg/dL   6-29 days.................<15.0 mg/dL    Alkaline Phosphatase 55 - 135 U/L 100    AST 10 - 40 U/L 26    ALT 10 - 44 U/L 30    Anion Gap 8 - 16 mmol/L 11    eGFR if African American >60 mL/min/1.73 m^2 12.6Abnormal     eGFR if non African American >60 mL/min/1.73 m^2 10.9Abnormal     Comment: Calculation used to obtain the estimated glomerular filtration   rate (eGFR) is the CKD-EPI equation.            Assessment:   1. STEFAN  2. Weakness   3. Dizziness  4. Adenocarcinoma of R lung    Plan:   1,2,3. Lab work has been reviewed.  Creatinine has come down slightly to 3.8, eGFR 12.6. I have discussed these results with her and her son. Although, she is improving, her kidney function is still compromised. In addition, this is contributing to her dehydration, weakness and fatigue.   Hence, we will give a liter of IVF tomorrow, over an hour. I will also schedule for her to have IVF on Thursday. 1L of normal saline over an hour.   -She is due to have repeat lab work on Thursday during her clinic visit with Dr. Vazquez. Will continue to monitor  -Pte encouraged to eat meals with an adequate amount of protein as well.     4. Due to f/u with Dr. Vazquez on Thursday, 10/22/2020 to review PET/CT results and discuss treatment going forward. Currently she is on Tarceva with denosumab inj for bone support.   -will review lab work on this day.     Pte and family members displayed understanding of the above encounter and treatment plan. All thoughtful questions were answered to their satisfaction. Pte was advised to notify the care team or proceed to the ER if signs and symptoms worsen.

## 2020-10-21 NOTE — PLAN OF CARE
Problem: Adult Inpatient Plan of Care  Goal: Optimal Comfort and Wellbeing  Intervention: Provide Person-Centered Care  Flowsheets (Taken 10/21/2020 1246)  Trust Relationship/Rapport:   questions encouraged   care explained   reassurance provided   emotional support provided   empathic listening provided   choices provided   thoughts/feelings acknowledged   questions answered

## 2020-10-21 NOTE — PLAN OF CARE
Pt tolerated 1 L IVFs today. NAD.PIV flushed and removed. AVS given to pt. Discharged home. Ambulated independently to w/c with son.

## 2020-10-22 PROBLEM — N18.4 CHRONIC KIDNEY DISEASE (CKD) STAGE G4/A3, SEVERELY DECREASED GLOMERULAR FILTRATION RATE (GFR) BETWEEN 15-29 ML/MIN/1.73 SQUARE METER AND ALBUMINURIA CREATININE RATIO GREATER THAN 300 MG/G: Status: ACTIVE | Noted: 2017-04-07

## 2020-10-22 NOTE — TELEPHONE ENCOUNTER
----- Message from Devika Chappell PA-C sent at 10/21/2020  1:52 PM CDT -----  Please schedule IVF on 10/22/2020, 1L of normal saline over an hour. Thank you.

## 2020-10-22 NOTE — TELEPHONE ENCOUNTER
----- Message from Karrie Vazquez MD sent at 10/22/2020  3:22 PM CDT -----  CAn you call Basil and let him Cr is 3.7

## 2020-10-22 NOTE — PROGRESS NOTES
"Subjective:       Patient ID: Naty St is a 75 y.o. female.    Chief Complaint: Follow-up  Oncologic History:  Ms. Naty St was admitted to the hospital between 01/25/2016 and 01/28/2016. She has a history of pancreatic cancer 17 years ago, breast cancer and meningioma, presented to the hospital complaining of loss of consciousness. The patient apparently was in her normal state of health and she stood up to go to the bathroom and fell to the floor. The patient's daughter helped the mother up in the bathroom and noted that her mother's upper extremities were shaking and eye rolling. No reports of bowel or bladder incontinence, tongue biting or rolling. The second episode lasted about four minutes and she was extremely lethargic following that. Apparently, the patient had a meningioma resection done in the past; however, recently, underwent neurosurgical resection with Dr. 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    Additional immunohistochemical stains were performed which revealed the tumor cells to be are positive for TTF1 and negative for ER and GCDFP. The morphology and TTF1 positivity are most consistent with lung primary.    Of note, imaging scan at the end of January 2016 revealed a mass in the lung at 2 cm in the medial aspect of the apical segment of the right upper lobe abutting the mediastinum at the level of the azygous vein and abutting and possibly encasing the segmental bronchi and vessels of the apical segment of the right upper lobe and no pleural fluid was present. Also, there is an enlarged right paratracheal lymph node. No evidence of any metastatic disease at the pancreatic site with postoperative changes post Whipple disease  Her PET Scan from 2/15/16 reveal "Hypermetabolic mass in the right lung apex consistent with a primary malignancy. Hypermetabolic " "mediastinal lymph nodes consistent with metastatic disease. Right sacral hypermetabolic lesion consistent with metastatic disease, noting additional mildly sclerotic lesions in multiple vertebral bodies which do not demonstrate abnormal hypermetabolism  She underwent IR bone biopsy which revealed metastatic adenocarcinoma of lung origin. 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  She has completed focal RT to brain lesions in March 2016.    PET scan from 6/6/16 shows "Dramatic almost complete response to therapy."  Lovenox started after US lower ext 6/7/16 shows Acute complete occlusion of one of the left posterior tibial vein.Remote partial thrombus of the proximal left superficial femoral vein.  She is on Tarceva.   12/6/16 PET scan reveals "Stable right upper lobe lesion and right hilar lymph node. No new lesions identified. Trace left pleural effusion".  1/27/17 Renal u/s "Medical renal disease. Nonobstructive right nephrolithiasis"  1/31/17 PET - "Right upper lobe nodule and right hilar lymph node similar and very low grade activity.  There is no definite evidence of recurrence."   11/9/17 PET "In this patient with history of lung cancer, there is interval increase in size and hypermetabolism of a right upper lobe lung lesion concerning for recurrent disease. Additionally, there is interval appearance of a hypermetabolic paratracheal lymph node suspicious for metastatic disease"     She progressed on Tarceva She underwent EBUS on 12/5/17. Pathology revealed adenocarcinoma but T790m could not be done as quantity was not insufficient. Her PET scan from 1/30/18 revealed The patient's previously identified abnormal lesions is slightly greater uptake of FDG on today's study compared with prior exam. These findings are concerning for progression of disease"      She was hospitalized between 4/9/18-4/16/18. Her hospital course was as follows "Patient was admitted to hemonc service on 4/9 with acute hypoxic respiratory " "failure. She was requiring 4 L of nc on admission. She was started on moxi for CAP. She received one dose of ceftriaxone in the ED. On 2nd day of admission she spiked a fever. Her Hb was ~7 g/dl so she was transfused 1 unit of PRBCs. Over night she developed worsening of her symptoms with increased O2 requirements to 15 L HFNC. Her CXR showed worsening congestion. There was a concern of TRALI vs TACO. New 2D echo with diastolic dysfunction. She was given IV lasix pushes as needed and was started on dexamethasone.  Her abx was escalated to vanc and cefepime. She improved with diuresis and steroids. Pulmonary were consulted. Her O2 requirements continued to improve. On 4/15 she was switched to Augmentin. PT recommended discharging her to SNF but the patient refused and she wanted to go home with home health. She qualified for home oxygen so she was discharged on 3 L nc while at rest and 6 while active. Augmentin and dexamethasone were discontinued on discharge"     She was readmitted from 4/27 to 5/3/18 with who presented to the ED with a complaint of fatigue and worsening DELA CRUZ. Pt diagnosed PNA 4/9 and was discharged on 4/16 on 3L oxygen. She was initially placed on cefepime for 3 days followed by an add'l 2 days of Augmentin. Pulm was consulted with assistance as her oxygen requirements were increasing. She was placed on oral steroids, then trailed on 80mg TID solumedrol for 2 days and will be discharged on a prednisone taper. She was also diuresed with 2 days of 40mg IV lasix with appropriate UOP resulting, improvement on repeat CXR. She was medically stable and appropriate for DC home with home health on 1L continuous O2. She will be seen for close f/u and may be able to come off oxygen at that time.      She discontinued Tagrisso in April 2018. Restaging scans from 6/4/18 revealed "Stable appearance of a spiculated right upper lobe pulmonary lesion.  Additional irregular focus superior to the lesion in the right " "lung apex is stable and may represent scar or additional lesion; although this was not hypermetabolic on prior PET-CT.  No new pulmonary lesions. 1.3 cm subcarinal lymph node, not hypermetabolic on prior PET-CT. Stable postsurgical changes of a Whipple procedure. Bilateral nonobstructing nephrolithiasis.  Stable sclerotic focus in the T5 vertebral body, possibly a bone island as this was not hypermetabolic on prior PET-CT. Small hiatal hernia. RECIST SUMMARY: Date of prior examination for comparison 01/30/2018 Lesion 1: Right upper lobe 1.3 cm Series 2 image 31 prior measurement 1.3 cm"  Bone scan also from 6/4/18 revealed no evidence of mets.     Her PET scan from 7/11/18 revealed "Right suprahilar lesion SUV max 3.76, previously 6.86. Pretracheal lymph node SUV max 2.55, previously 3.79. There is physiologic intracranial, head, and neck activity.  There is muscle activation.  There is vocal cord activation.  There is physiologic liver, spleen, GI and  activity.  There is a hiatal hernia.  Pelvic organs show nothing unusual.  No bone lesions are seen.  There are areas of benign appearing lung scar"  She notes fatigue.  She denies any nausea, vomiting, diarrhea, constipation, abdominal pain, weight loss or loss of appetite, chest pain, shortness of breath, leg swelling, fatigue, pain, headache, dizziness, or mood changes. Her ECOG PS is 2. She is accompanied by her  and son     She has been on Tarceva since 6/5/18. She completed SBRT to her right lung lesions.     HPI Restaging PET scan from 10/15/2020 reveals "In this patient with history of lung cancer, the previously described hypermetabolic right paratracheal and right hilar lesions appears stable in regards to metabolic activity compared to prior exam.  However, there has been interval increase in size and metabolic activity of additional pulmonary nodules scattered within both lungs, as further detailed above.  Findings concerning for disease " "progression/metastases.zAdditional findings as above"  She was hospitalized from 10/15-10/17/2020 with renal failure, Tarceva was held.  She comes in for a hospital follow-up. She is here with her son. She denies any new issues      Review of Systems   Constitutional: Negative for appetite change, fatigue and unexpected weight change.   HENT: Negative for mouth sores.    Eyes: Negative for visual disturbance.   Respiratory: Negative for cough and shortness of breath.    Cardiovascular: Negative for chest pain.   Gastrointestinal: Negative for abdominal pain and diarrhea.   Genitourinary: Negative for frequency.   Musculoskeletal: Negative for back pain.   Integumentary:  Negative for rash.   Neurological: Negative for headaches.   Hematological: Negative for adenopathy.   Psychiatric/Behavioral: The patient is not nervous/anxious.    All other systems reviewed and are negative.        Objective:      Physical Exam  Vitals signs reviewed.   Constitutional:       Appearance: She is well-developed.   HENT:      Mouth/Throat:      Pharynx: No oropharyngeal exudate.   Cardiovascular:      Rate and Rhythm: Normal rate.      Heart sounds: Normal heart sounds.   Pulmonary:      Effort: Pulmonary effort is normal.      Breath sounds: Normal breath sounds. No wheezing.   Abdominal:      General: Bowel sounds are normal.      Palpations: Abdomen is soft.      Tenderness: There is no abdominal tenderness.   Musculoskeletal:         General: No tenderness.   Lymphadenopathy:      Cervical: No cervical adenopathy.   Skin:     General: Skin is warm and dry.      Findings: No rash.   Neurological:      Mental Status: She is alert and oriented to person, place, and time.      Coordination: Coordination normal.   Psychiatric:         Thought Content: Thought content normal.         Judgment: Judgment normal.           LABS:  WBC   Date Value Ref Range Status   10/22/2020 5.13 3.90 - 12.70 K/uL Final     Hemoglobin   Date Value Ref " Range Status   10/22/2020 8.7 (L) 12.0 - 16.0 g/dL Final     POC Hematocrit   Date Value Ref Range Status   02/21/2020 30 (L) 36 - 54 %PCV Final     Hematocrit   Date Value Ref Range Status   10/22/2020 29.4 (L) 37.0 - 48.5 % Final     Platelets   Date Value Ref Range Status   10/22/2020 207 150 - 350 K/uL Final     Gran # (ANC)   Date Value Ref Range Status   10/22/2020 3.3 1.8 - 7.7 K/uL Final     Comment:     The ANC is based on a white cell differential from an   automated cell counter. It has not been microscopically   reviewed for the presence of abnormal cells. Clinical   correlation is required.         Chemistry        Component Value Date/Time     10/22/2020 1111    K 4.8 10/22/2020 1111     (H) 10/22/2020 1111    CO2 15 (L) 10/22/2020 1111    BUN 55 (H) 10/22/2020 1111    CREATININE 3.7 (H) 10/22/2020 1111     (H) 10/22/2020 1111        Component Value Date/Time    CALCIUM 8.5 (L) 10/22/2020 1111    ALKPHOS 89 10/22/2020 1111    AST 28 10/22/2020 1111    ALT 25 10/22/2020 1111    BILITOT 0.5 10/22/2020 1111    ESTGFRAFRICA 13.0 (A) 10/22/2020 1111    EGFRNONAA 11.3 (A) 10/22/2020 1111           Assessment:       1. Malignant neoplasm of lower lobe of right lung    2. 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 Chronic kidney disease (CKD) stage G4/A3, severely decreased glomerular filtration rate (GFR) between 15-29 mL/min/1.73 square meter and albuminuria creatinine ratio greater than 300 mg/g        Plan:        1,2,3,4. Reviewed labs creatinine still very high to resume Tarceva. Will monitor labs weekly, if her creatine returns to baseline, maybe able to resume Tarceva. If no then will plan on Afatinib at 30 mg dosing. The rationale for this was discussed with patient and her son and all questions were answered. Will hold off on MRI brain as she is currently asymptomatic, and her crcl is too low to safely do contrast.    Above care  plan was discussed with patient and accompanying son and all questions were addressed to their satisfaction

## 2020-10-22 NOTE — Clinical Note
Schedule CMP weekly X 3 weeks   Also needs to see nephrology for follow-up after hospital follow-up

## 2020-10-22 NOTE — PLAN OF CARE
Problem: Adult Inpatient Plan of Care  Goal: Optimal Comfort and Wellbeing  Intervention: Provide Person-Centered Care  Flowsheets (Taken 10/22/2020 1634)  Trust Relationship/Rapport:   questions encouraged   choices provided   emotional support provided   thoughts/feelings acknowledged   empathic listening provided   reassurance provided   care explained   questions answered

## 2020-10-22 NOTE — PLAN OF CARE
Pt tolerated 1L IVFs today. NAD. PIV flushed and removed. declined AVS. Uses my [facility]. Discharged home. Ambulated independently with son.

## 2020-10-27 NOTE — TELEPHONE ENCOUNTER
Contacted patient to confirm Tarceva therapy remains on holding in the setting of elevated creatinine/STEFAN secondary to her therapy. Patient confirmed that she is to continue holding therapy at this time until her creatinine levels improve. She is scheduled for repeat labs on 10/29 at 1 PM. The patient has more than a week of medication on hand should she be instructed to resume therapy. Patient's most recent creatinine level is 3.7 mg/dL on 10/22. Informed patient that we will continue to monitor her creatinine levels and chart notes to determine when it is appropriate to resume therapy and reach out to set up her refill. Patient verbalized understanding. She has no questions or concerns at this time. She is aware to contact [facility] if she needs anything.     **Will close out refill activity at this time and reopen if/when patient is to resume therapy.**

## 2020-10-30 NOTE — TELEPHONE ENCOUNTER
----- Message from Karrie Vazquez MD sent at 10/29/2020  4:51 PM CDT -----  Creatinine is still 3.9, so we may have to switch to Afatinib

## 2020-10-30 NOTE — TELEPHONE ENCOUNTER
----- Message from Kateryna Salas sent at 10/22/2020  3:42 PM CDT -----  Regarding: 10/22 pending appt with Neph      Also needs to see nephrology for follow-up after hospital follow-up

## 2020-10-30 NOTE — TELEPHONE ENCOUNTER
----- Message from Maria Elena Rivera sent at 10/30/2020 10:54 AM CDT -----  Regarding: Speak with office  Contact: Basil  Calling to speak with someone in office.    Basil# [phone]

## 2020-10-30 NOTE — TELEPHONE ENCOUNTER
Left vm for son with creatinine level at 3.9.  Encouraged hydration.  Will trend labs again next week---and dr sullivan will make decision on plan of care.

## 2020-11-05 NOTE — TELEPHONE ENCOUNTER
"----- Message from Surya Cheatham sent at 11/5/2020  8:01 AM CST -----  Returning a Missed Scheduling Call    Contact Preference: [phone]  Patient returning phone call to:  Kateryna Salas  Appointment Type:  Provider: Dr Vazquez   Does patient feel the need to be seen today?    Additional Notes:  "Thank you for all that you do for our patients'"       "

## 2020-11-05 NOTE — TELEPHONE ENCOUNTER
"----- Message from Dorinda Giles sent at 11/5/2020 10:38 AM CST -----  Patient Assist    Name of caller:  Naty St  Contact Preference: [phone]  Does Patient feel the need to see the MD today? No  Physician: Dr. Vazquez   What is the nature of the call?    - Please give patient son a call, he would like  To modify her appt.     Additional Notes:   "Thank you for all that you do for our patients'"          "

## 2020-11-06 NOTE — TELEPHONE ENCOUNTER
Spoke to patient, she states she is taking to 500 mg pills of calcium in the morning and 2 in the evening.

## 2020-11-06 NOTE — TELEPHONE ENCOUNTER
"----- Message from Surya Cheatham sent at 11/6/2020  3:05 PM CST -----  Consult/Advisory:    Name Of Caller: Basil   Contact Preference?: [phone]    Does patient feel the need to be seen today? No     What is the nature of the call?:  -returning call to Prabha Farfan RN        Additional Notes:  "Thank you for all that you do for our patients'"    "

## 2020-11-06 NOTE — TELEPHONE ENCOUNTER
Son states she take 2,400mg in the morning and in the evening. Son says Tuesday  Would be better for her to come for the infusion. He is asking if he should increase the dose. Told him to NOT increase the dose. Nephrology needs to be contacted to discuss dose changes. He verbalized an understanding.

## 2020-11-10 NOTE — PLAN OF CARE
Pt received 1G of Calcium gluconate; tolerated well. VSS and NAD. Pt instructed to call MD with any concerns. Pt discharged home independently.       Problem: Fluid Volume Deficit  Goal: Fluid Balance  Outcome: Ongoing, Progressing

## 2020-11-12 NOTE — TELEPHONE ENCOUNTER
----- Message from Alina Sigala sent at 11/12/2020  8:27 AM CST -----  Contact: Basil (son)  Reschedule existing appt      Appt date rescheduling:: 11/12    Visit type :: lab    Treating Provider:: George    Do you feel you need to be seen today:: No    Communication preference:: [phone]    Addition info::

## 2020-11-13 NOTE — TELEPHONE ENCOUNTER
----- Message from Kinjal Smith sent at 11/13/2020  2:05 PM CST -----  Regarding: Medication concern and appt possible reschedule  Contact: Basil [phone]  Pt son stated he needs to speak with Zandra in regards to medication that pt has been taken off of. He stated that pt will be coming on a month that the pt has been off the medication. Due to pt being weak and not feeling well son would like to know if it is okay to reschedule appt for today? Since pt is not feeling well. Pt son is concerned but would like to know if that is okay?    Call back: Basil [phone]

## 2020-11-13 NOTE — TELEPHONE ENCOUNTER
Spoke with son-he is calling to r/s her labs today to next Tuesday.  Nurse r/s ---and he understands nurse/dr sullivan will call him the next day with results--to determine next plan of care.

## 2020-11-16 PROBLEM — R50.9 FEVER: Status: ACTIVE | Noted: 2020-01-01

## 2020-11-17 PROBLEM — R74.01 TRANSAMINITIS: Status: ACTIVE | Noted: 2020-01-01

## 2020-11-17 PROBLEM — A41.9 SEPSIS: Status: ACTIVE | Noted: 2020-01-01

## 2020-11-17 NOTE — PT/OT/SLP PROGRESS
Physical Therapy      Patient Name:  Naty St   MRN:  [id number]    Patient not seen today secondary to fatigue. PT attempted to see pt at 1230 and 1503, pt declined and requested that PT return tomorrow preferably in the afternoon. PT to follow up 11/18 as schedule permits.     Diane Brito, PT, DPT  11/17/2020  Pager# [phone]

## 2020-11-17 NOTE — ASSESSMENT & PLAN NOTE
S/p resection with NSGY. CT head on presentation with stable post-surgical findings.    - Continuing home keppra 500mg bid

## 2020-11-17 NOTE — ED NOTES
Admitted to floor. Diagnosis, medications, & POC discussed with patient. Patient verbalized understanding. All questions and concerns answered. No needs expressed at the time. Pt is awake, alert and oriented x4 with no acute distress noted. Respirations even and unlabored. Per stretcher out of ED to floor. Mask in place and pt is on telemetry monitor. Pt's belongings are with family member.

## 2020-11-17 NOTE — TELEPHONE ENCOUNTER
----- Message from Kinjal Smith sent at 11/17/2020  9:31 AM CST -----  Regarding: Admission  Contact: Basil @ [phone]  Pt son calling to inform Dr. Vazquez about pt's admission from emergency room last night. Pt son wanted to inform Zandra and speak with Zandra in regards to blood work that needed to be completed today.    Call back: [phone]

## 2020-11-17 NOTE — PLAN OF CARE
Pt remains free from falls and injuries during shift. Awake, alert, and oriented x 4. Vital signs stable. Denied pain. No signs of acute distress noted at this time. Bed in lowest position, wheels locked, and bed alarm on. Call light in reach. Will continue to monitor, safety maintained.

## 2020-11-17 NOTE — ED NOTES
Pt had one episode of urine via bed pan. Meredith area cleansed with warm wipes. No skin breakdown noted. Specimens collected and sent to lab. Pt aware she is awaiting transport to 11th floor at this time. Pt verbalized understanding.

## 2020-11-17 NOTE — ED NOTES
Pt's family member provided with IP room #, phone # to 11th floor, and educated on visitation policy and hours. Understanding verbalized at this time. Pt is aware that this RN will give report to 11th floor RN and transport will be arranged for pt following report. Pt aware of POC and denies needs at this time. Pt denies pain. Pt remains on telemetry monitor, automated BP cuff, and pulse oximeter. SR raised x2, bed locked and low. Pt's family member to bring pt belongings home at this time.

## 2020-11-17 NOTE — PLAN OF CARE
OT amber completed and goals established.  JORGE Talley/JAMES  Pager #: [phone]  11/17/2020    Problem: Occupational Therapy Goal  Goal: Occupational Therapy Goal  Description: Goals to be met by: 12/1/2020    Patient will increase functional independence with ADLs by performing:    LE Dressing with [place].  Grooming while standing with [place].  Toileting from toilet with [place] for hygiene and clothing management.   Stand pivot transfers with [place].  Toilet transfer to toilet with [place].    Outcome: Ongoing, Progressing

## 2020-11-17 NOTE — CARE UPDATE
Spoke with patient and her son at bedside and gave all updates and answered all questions.    Bia Horner

## 2020-11-17 NOTE — PT/OT/SLP EVAL
Occupational Therapy   Evaluation and Treatment    Name: Naty St  MRN: [id number]  Admitting Diagnosis: Sepsis    Recommendations:     Discharge Recommendations: home health OT  Discharge Equipment Recommendations:  none  Barriers to discharge:  None    Assessment:     Naty St is a 75 y.o. female with a medical diagnosis of Sepsis. She presents with the following performance deficits affecting function: weakness, impaired endurance, impaired self care skills, impaired functional mobilty, gait instability, impaired balance. Patient agreeable to therapy session. Patient completed ADLs with supervision to SBA and functional mobility with SBA to CGA with 1 loss of balance. At this time, patient will continue to benefit from acute skilled therapy intervention to address deficits/underlying impairments and progress towards prior level of function. After discharge, patient will benefit from receiving home health therapy services to ensure safety and independence in the home environment.    Rehab Prognosis: Good; patient would benefit from acute skilled OT services to address these deficits and reach maximum level of function.       Plan:     Patient to be seen 2 x/week to address the above listed problems via self-care/home management, therapeutic activities, therapeutic exercises  · Plan of Care Expires: 12/17/20  · Plan of Care Reviewed with: patient    Subjective     Chief Complaint: none stated  Patient/Family Comments/goals: to get better and go home    Occupational Profile:  Living Environment: Patient lives with her  in a [facility] with 8 DENNY, B handrails. Patient has a tub/shower combo with shower chair.  Previous level of function: Independent with ADLs and ambulates with no AD.  performs housekeeping tasks and occasionally assists with bathing. Patient reported 2 falls within the past week.  Roles and Routines: Does not drive  Equipment Used at Home: bedside commode, walker,  rolling, shower chair, cane, straight  Assistance upon Discharge: Family can assist    Pain/Comfort:  · Pain Rating 1: 0/10    Patients cultural, spiritual, Taoism conflicts given the current situation: no    Objective:     Communicated with: RN prior to session. Patient found standing EOB with peripheral IV, telemetry upon OT entry to room.    General Precautions: Standard, fall   Orthopedic Precautions:N/A   Braces: N/A     Occupational Performance:    Bed Mobility:    · Not assessed    Functional Mobility/Transfers:  · Patient completed Sit <> Stand Transfer with supervision with no assistive device   · Patient completed Bed > Chair Transfer using Stand Pivot technique with stand by assistance with no assistive device  · Patient completed Toilet Transfer Step Transfer technique with contact guard assistance with no assistive device  · Functional Mobility: Patient ambulated through obstacles within room with SBA to CGA with 1 loss of balance using wall for support.     Activities of Daily Living:  · Lower Body Dressing: independence doffing/donning socks sitting EOB    Cognitive/Visual Perceptual:  Cognitive/Psychosocial Skills:    -       Follows Commands/attention:Follows multistep  commands  -       Communication: clear/fluent  -       Memory: No Deficits noted  -       Safety awareness/insight to disability: intact   -       Mood/Affect/Coping skills/emotional control: Appropriate to situation, Cooperative and Pleasant    Physical Exam:  Postural examination/scapula alignment:    -       No postural abnormalities identified  Dominant hand:    -       Right  Upper Extremity Range of Motion:     -       Right Upper Extremity: WNL  -       Left Upper Extremity: WNL  Upper Extremity Strength:    -       Right Upper Extremity: WFL  -       Left Upper Extremity: WFL  Fine Motor Coordination:    -       Intact    AMPAC 6 Click ADL:  AMPAC Total Score: 21    Treatment & Education:   Therapist provided  facilitation and instruction of proper body mechanics and fall prevention strategies during tasks listed above.   Instructed patient to sit in bedside chair daily to increase OOB/activity tolerance.   Instructed patient to use call light to have nursing staff assist with needs/transfers.   Discussed OT POC and answered all questions within OT scope of practice.   Whiteboard updated   Education:    Patient left up in chair with all lines intact and call button in reach    GOALS:   Multidisciplinary Problems     Occupational Therapy Goals        Problem: Occupational Therapy Goal    Goal Priority Disciplines Outcome Interventions   Occupational Therapy Goal     OT, PT/OT Ongoing, Progressing    Description: Goals to be met by: 12/1/2020    Patient will increase functional independence with ADLs by performing:    LE Dressing with [place].  Grooming while standing with [place].  Toileting from toilet with [place] for hygiene and clothing management.   Stand pivot transfers with [place].  Toilet transfer to toilet with [place].                     History:     Past Medical History:   Diagnosis Date    Anticoagulant long-term use     Blood clot in vein 06/2016    Breast cancer 1994    Cataract     Hypertension     Lung cancer     Lung cancer metastatic to brain     Pancreatic cancer 1998    Posterior capsular opacification, left eye     Psychiatric problem     Seizures 01/25/2016    Stroke     Therapy        Past Surgical History:   Procedure Laterality Date    BONE BIOSPY  3/9/16    BRAIN SURGERY  1/12/16    tumor removal 1/12/16    BREAST LUMPECTOMY  1998    left    CATARACT EXTRACTION Bilateral 2004    yag OU    CHOLECYSTECTOMY  1998    COLONOSCOPY N/A 11/13/2015    Procedure: COLONOSCOPY;  Surgeon: Alex Carmona MD;  Location: [address];  Service: Endoscopy;  Laterality: N/A;  2 year f/u    COLONOSCOPY N/A 11/7/2018    Procedure: COLONOSCOPY;  Surgeon:  Jim Raman MD;  Location: [facility] (4TH FLR);  Service: Endoscopy;  Laterality: N/A;  Eliquis - per Dr. George santiago to hold Eliquis x 2 days prior to colon- ERW    COLONOSCOPY N/A 6/2/2020    Procedure: COLONOSCOPY;  Surgeon: Darius Medina MD;  Location: [facility] (2ND FLR);  Service: Endoscopy;  Laterality: N/A;  Schedule tomorrow  patient rescheduled due to poor bowel prep-BB  rapid covid test completed on 6/1/20 per ROQUE Zabala, email sent to Endo Staff-BB  updated instructions emailed to patient-BB  Latex allergy  approval to hold Eliquis received, see telephone encounter 5/18/20-BB      cysto and right ureteral stent  4/27/12    ecoli  2010    removal of blockage, done throat, then through the liver.    ESOPHAGOGASTRODUODENOSCOPY N/A 6/2/2020    Procedure: EGD (ESOPHAGOGASTRODUODENOSCOPY);  Surgeon: Darius Medina MD;  Location: [facility] (2ND FLR);  Service: Endoscopy;  Laterality: N/A;  Schedule tomorrow  patient rescheduled due to poor bowel prep-BB  rapid covid test completed on 6/1/20 per ROQUE Zabala, email sent to Endo Staff-BB  updated instructions emailed to patient-BB  Latex allergy  approval to hold Eliquis received, see telephone     HYSTERECTOMY  1974    KIDNEY STONE SURGERY  2010--laser    left eswl  6/20/12    OOPHORECTOMY  4/25/2012    Laparoscopic BSO, lysis of adhesions, cystoscopy greater than 35mins     WHIPPLE PROCEDURE W/ LAPAROSCOPY  1998       Time Tracking:     OT Date of Treatment: 11/17/20  OT Start Time: 1040  OT Stop Time: 1056  OT Total Time (min): 16 min    Billable Minutes:Evaluation 8  Therapeutic Activity 8    Jewels Perez, OT  11/17/2020

## 2020-11-17 NOTE — TELEPHONE ENCOUNTER
Spoke with son.   He is calling to let us know his mom is admitted.  Nurse spoke with dr sullivan----  Once DC'd from hospital, Dr. Sullivan will bring her back into clinic to reassess her kidney function and her performance status and come up with plan of care.  He voiced understanding.

## 2020-11-17 NOTE — ED PROVIDER NOTES
Encounter Date: 11/16/2020       History     Chief Complaint   Patient presents with    Emesis     Pt has fever of 101.3 Pt reports not feeling good for the past couple of days. +dizzines, +fever, +chills, +dry cough. Denies sob or chest pain. Pt hx of breast and pancreatic cancer. Pt has +2 edema BLE    Dizziness    Cough    Fever     74 yo F with pmh of history of pancreatic cancer 17 years ago, breast cancer and meningioma s/p NSGY resection 1/12/2016, CVA/TIA 2017, currently on Eliquis BID, presenting with fever for 1 day, presenting with dizziness for 2 hours today, weakness, and cough and increased LE edema bl. Tmax 103.5 with EMS. Pt endorsing dizziness involving room spinning upon waking today, but pt unable to recall worsening symptoms with standing or turning head. She endorses prior similar episode 1 year ago but never received treatment/medication changes. She was switched off Tarciva chemo 2 weeks ago, and lasix 1 week ago, and was switched from 1 BP med to another, which she cannot recall. Scheduled to see hem/onc tomorrow.   Denies consistent abd pain, constipation, headache, SOB, chest pain.    The history is provided by the patient and medical records. No  was used.            Review of patient's allergies indicates:   Allergen Reactions    Tobradex [tobramycin-dexamethasone] Swelling    Iodinated contrast media Hives    Latex Rash and Hives    Phenytoin sodium extended Other (See Comments) and Rash     Past Medical History:   Diagnosis Date    Anticoagulant long-term use     Blood clot in vein 06/2016    Breast cancer 1994    Cataract     Hypertension     Lung cancer     Lung cancer metastatic to brain     Pancreatic cancer 1998    Posterior capsular opacification, left eye     Psychiatric problem     Seizures 01/25/2016    Stroke     Therapy      Past Surgical History:   Procedure Laterality Date    BONE BIOSPY  3/9/16    BRAIN SURGERY  1/12/16    tumor  removal 1/12/16    BREAST LUMPECTOMY  1998    left    CATARACT EXTRACTION Bilateral 2004    yag OU    CHOLECYSTECTOMY  1998    COLONOSCOPY N/A 11/13/2015    Procedure: COLONOSCOPY;  Surgeon: Alex Carmona MD;  Location: [facility] (4TH FLR);  Service: Endoscopy;  Laterality: N/A;  2 year f/u    COLONOSCOPY N/A 11/7/2018    Procedure: COLONOSCOPY;  Surgeon: Jim Raman MD;  Location: [facility] (4TH FLR);  Service: Endoscopy;  Laterality: N/A;  Eliquis - per Dr. Vazquez -ok to hold Eliquis x 2 days prior to colon- ERW    COLONOSCOPY N/A 6/2/2020    Procedure: COLONOSCOPY;  Surgeon: Darius Medina MD;  Location: [facility] (2ND FLR);  Service: Endoscopy;  Laterality: N/A;  Schedule tomorrow  patient rescheduled due to poor bowel prep-BB  rapid covid test completed on 6/1/20 per ROQUE Zabala, email sent to Endo Staff-BB  updated instructions emailed to patient-BB  Latex allergy  approval to hold Eliquis received, see telephone encounter 5/18/20-BB      cysto and right ureteral stent  4/27/12    ecoli  2010    removal of blockage, done throat, then through the liver.    ESOPHAGOGASTRODUODENOSCOPY N/A 6/2/2020    Procedure: EGD (ESOPHAGOGASTRODUODENOSCOPY);  Surgeon: Darius Medina MD;  Location: [facility] (2ND FLR);  Service: Endoscopy;  Laterality: N/A;  Schedule tomorrow  patient rescheduled due to poor bowel prep-BB  rapid covid test completed on 6/1/20 per ROQUE Zabala, email sent to Endo Staff-BB  updated instructions emailed to patient-BB  Latex allergy  approval to hold Eliquis received, see telephone     HYSTERECTOMY  1974    KIDNEY STONE SURGERY  2010--laser    left eswl  6/20/12    OOPHORECTOMY  4/25/2012    Laparoscopic BSO, lysis of adhesions, cystoscopy greater than 35mins     WHIPPLE PROCEDURE W/ LAPAROSCOPY  1998     Family History   Problem Relation Age of Onset    Breast cancer Mother     Lung cancer Mother     Leukemia Paternal Grandfather     Stomach cancer Paternal Grandmother      Colon cancer Neg Hx     Ovarian cancer Neg Hx      Social History     Tobacco Use    Smoking status: Former Smoker     Packs/day: 0.00     Years: 0.00     Pack years: 0.00     Quit date: 1961     Years since quittin.1    Smokeless tobacco: Never Used   Substance Use Topics    Alcohol use: No    Drug use: No     Review of Systems   Constitutional: Positive for fever. Negative for activity change, appetite change and unexpected weight change.   HENT: Negative for congestion, dental problem and trouble swallowing.    Eyes: Negative for photophobia and pain.   Respiratory: Positive for cough.    Cardiovascular: Positive for leg swelling. Negative for chest pain and palpitations.   Gastrointestinal: Positive for vomiting. Negative for abdominal distention and abdominal pain.   Endocrine: Negative for heat intolerance and polyuria.   Genitourinary: Negative for difficulty urinating, flank pain, frequency and urgency.   Musculoskeletal: Negative for arthralgias.   Skin: Negative for rash and wound.   Neurological: Positive for weakness. Negative for seizures, numbness and headaches.   Hematological: Does not bruise/bleed easily.   Psychiatric/Behavioral: Negative for agitation and decreased concentration.       Physical Exam     Initial Vitals [20]   BP Pulse Resp Temp SpO2   (!) 153/73 102 16 (!) 101.3 °F (38.5 °C) 99 %      MAP       --         Physical Exam    Nursing note and vitals reviewed.  Constitutional: She appears well-developed and well-nourished. No distress.   HENT:   Head: Normocephalic and atraumatic.   Mouth/Throat: No oropharyngeal exudate.   Eyes: EOM are normal. Pupils are equal, round, and reactive to light.   Conjunctiva pale   Neck: Normal range of motion. No tracheal deviation present.   Cardiovascular: Normal rate, regular rhythm, normal heart sounds and intact distal pulses.   Pulmonary/Chest: Breath sounds normal. No respiratory distress.   Abdominal: Soft. Bowel sounds  are normal. There is no abdominal tenderness.   Musculoskeletal: Normal range of motion.      Comments: 2+ LE edema   Neurological: She is alert and oriented to person, place, and time. She has normal strength. No cranial nerve deficit or sensory deficit.   A&Ox3  CN II-XII intact, strength 5/5 UE and LE  FTN, HTS intact, negative pronator  HINTs negative    Skin: Skin is warm.   Psychiatric: She has a normal mood and affect.         ED Course   Procedures  Labs Reviewed   CBC W/ AUTO DIFFERENTIAL - Abnormal; Notable for the following components:       Result Value    RBC 3.15 (*)     Hemoglobin 9.1 (*)     Hematocrit 29.9 (*)     MCHC 30.4 (*)     RDW 16.0 (*)     Gran # (ANC) 9.8 (*)     Immature Grans (Abs) 0.05 (*)     Lymph # 0.2 (*)     Gran % 91.4 (*)     Lymph % 2.3 (*)     All other components within normal limits   COMPREHENSIVE METABOLIC PANEL - Abnormal; Notable for the following components:    Chloride 114 (*)     CO2 13 (*)     BUN 39 (*)     Creatinine 3.4 (*)     Albumin 3.1 (*)     Total Bilirubin 2.1 (*)     Alkaline Phosphatase 527 (*)      (*)      (*)     eGFR if  14.4 (*)     eGFR if non  12.5 (*)     All other components within normal limits   MAGNESIUM - Abnormal; Notable for the following components:    Magnesium 1.4 (*)     All other components within normal limits   PHOSPHORUS - Abnormal; Notable for the following components:    Phosphorus 2.5 (*)     All other components within normal limits   PROTIME-INR - Abnormal; Notable for the following components:    Prothrombin Time 12.9 (*)     All other components within normal limits   B-TYPE NATRIURETIC PEPTIDE - Abnormal; Notable for the following components:     (*)     All other components within normal limits   CULTURE, BLOOD   CULTURE, BLOOD   LACTIC ACID, PLASMA   APTT   URINALYSIS, REFLEX TO URINE CULTURE   POCT INFLUENZA A/B MOLECULAR   SARS-COV-2 RDRP GENE    Narrative:     This  test utilizes isothermal nucleic acid amplification   technology to detect the SARS-CoV-2 RdRp nucleic acid segment.   The analytical sensitivity (limit of detection) is 125 genome   equivalents/mL.   A POSITIVE result implies infection with the SARS-CoV-2 virus;   the patient is presumed to be contagious.     A NEGATIVE result means that SARS-CoV-2 nucleic acids are not   present above the limit of detection. A NEGATIVE result should be   treated as presumptive. It does not rule out the possibility of   COVID-19 and should not be the sole basis for treatment decisions.   If COVID-19 is strongly suspected based on clinical and exposure   history, re-testing using an alternate molecular assay should be   considered.   This test is only for use under the Food and Drug   Administration s Emergency Use Authorization (EUA).   Commercial kits are provided by [employer].   Performance characteristics of the EUA have been independently   verified by [facility] Department of   Pathology and Laboratory Medicine.   _________________________________________________________________   The authorized Fact Sheet for Healthcare Providers and the authorized Fact   Sheet for Patients of the ID NOW COVID-19 are available on the FDA   website:     https://www.fda.gov/media/494129/download  https://www.fda.gov/media/319511/download       POCT GLUCOSE, HAND-HELD DEVICE     EKG Readings: (Independently Interpreted)   Initial Reading: No STEMI. Rhythm: Sinus Tachycardia. Heart Rate: 114. Ectopy: No Ectopy. Conduction: Normal. Clinical Impression: Sinus Tachycardia       Imaging Results          CT Head Without Contrast (Final result)  Result time 11/16/20 22:58:30    Final result by Susie Case MD (11/16/20 22:58:30)                 Impression:      1. No CT evidence of acute intracranial abnormality. Clinical correlation and further evaluation as warranted.  2. Postoperative change of prior bifrontal craniotomy with  left frontal lobe hypoattenuation/encephalomalacia unchanged from prior studies.  3. Hypoattenuation/encephalomalacia in the right cerebellum, similar to prior exam and suggestive of prior infarction.  4. Trace mucosal thickening/fluid in the left maxillary antrum and trace mucosal thickening of the inferior left frontal sinus and anterior left ethmoid air cell.  Correlation for sinusitis advised.      Electronically signed by: Susie Case MD  Date:    11/16/2020  Time:    22:58             Narrative:    EXAMINATION:  CT HEAD WITHOUT CONTRAST    CLINICAL HISTORY:  falls, dizziness;    TECHNIQUE:  Low dose axial images were obtained through the head.  Coronal and sagittal reformations were also performed. Contrast was not administered.    COMPARISON:  CTA head neck and MRI brain 02/24/2020,    FINDINGS:  There is no acute intracranial hemorrhage, hydrocephalus, midline shift or mass effect. There is postoperative change of bifrontal craniotomy with a stable region of hypoattenuation/encephalomalacia in the left frontal lobe presumably relating to prior intra-axial mass resection.  Findings appear similar when compared to prior examinations noting definitive evaluation for recurrence/residual lesion is limited on today's noncontrast CT exam.  There are additional small regions of hypoattenuation involving the right cerebellum, similar to prior study and suggestive of prior infarct.  Gray-white matter differentiation otherwise appears maintained.  Basal cisterns are patent.  There is trace mucosal thickening/fluid in the left maxillary antrum.  Trace mucosal thickening of the inferior left frontal sinus and anterior left ethmoid air cell.  The mastoid air cells are essentially clear.                               X-Ray Chest AP Portable (Final result)  Result time 11/16/20 22:31:21    Final result by Susie Case MD (11/16/20 22:31:21)                 Impression:      No acute intrathoracic abnormality identified  on this single radiographic view of the chest.      Electronically signed by: Susie Case MD  Date:    11/16/2020  Time:    22:31             Narrative:    EXAMINATION:  XR CHEST AP PORTABLE    CLINICAL HISTORY:  Sepsis;    TECHNIQUE:  Single frontal view of the chest was performed.    COMPARISON:  02/24/2020    FINDINGS:  Cardiac monitoring leads overlie the chest.  The cardiomediastinal silhouette is stable.  The lungs are symmetrically expanded without evidence of confluent airspace consolidation, significant volume of pleural fluid or pneumothorax.  Visualized osseous structures are intact.  Surgical clips project over the left axilla.                                 Medical Decision Making:   History:   Old Medical Records: I decided to obtain old medical records.  Old Records Summarized: records from previous admission(s).  Independently Interpreted Test(s):   I have ordered and independently interpreted EKG Reading(s) - see prior notes  Clinical Tests:   Lab Tests: Ordered and Reviewed  Radiological Study: Ordered and Reviewed  Medical Tests: Ordered and Reviewed  ED Management:  76 yo F with pmh of history of pancreatic cancer 17 years ago, breast cancer and meningioma s/p NSGY resection 1/12/2016, CVA/TIA 2017, currently on Eliquis BID, presenting with fever for 1 day, presenting with dizziness for 2 hours today, weakness, and cough and increased LE edema bl. DDx for CVA, posterior cerebellar infarct, symptomatic anemia, metabolic changes, hyper/hypoglycemia, toxin, CHF exacerbation, infection UTI/pna/sepsis. Cannot r/o posterior cerebellar infarct, however neuro exam nonfocal and no current dizziness. Currently on eliquis, recent fall hx, and poor historian.  VS notable for fever for 101.3, tachy to 115, normotensive to 134/96, sating 100% on RA.  CXR clear of pna or consolidation  EKG sinus tachy to 115, no ST changes  CTH negative for acute bleed, mucosal thickening/fluid in the L maxillary antrum and  in the sinus. [facility] need [facility].   Labs notable for 9.1 Hbg, stable from previous.  Transaminitis to 300s, and 500 Alk phos. Pt endorses abd pain only when she eats. Non-tender on exam.  Covered with Vanc, Cefepime, and ordered 250cc and tylenol for fever/sepsis. Added Flagyl for abd coverage.   Spoke with [facility] consultant regarding pt, [facility] [place].     Jinny Rios MD PGY3   [facility]  10:43 PM 11/16/2020                Attending Attestation:   Physician Attestation Statement for Resident:  As the supervising MD   Physician Attestation Statement: I have personally seen and examined this patient.   I agree with the above history. -: 76 yo female with a h/o HTN, DVT, metastatic R lung adenocarcinoma, breast CA s/p L lumpectomy, meningioma s/p resection, pancreatic CA s/p whipple p/w dizziness, fever.    As the supervising MD I agree with the above PE.   -: No meningismus.  Nonfocal neuro exam.  Non-tender abdomen.      As the supervising MD I agree with the above treatment, course, plan, and disposition.   -: No hypotension or elevated lactate to suggest septic shock at this time.  Covered broadly.  Stable anemia, no leukocytosis.   Abnormal LFTs, elevated Tbili.  Ordered for hepatic US at admission.  Case discussed with heme/onc, patient accepted for admission and further work-up/management.   I have reviewed and agree with the residents interpretation of the following: x-rays, EKG and lab data.  I have reviewed the following: old records at this facility.                    ED Course as of Nov 17 0032   Mon Nov 16, 2020 2105 Temp(!): 101.3 °F (38.5 °C) [AB]   2105 Pulse: 102 [AB]   2255 Stable from previous   Hemoglobin(!): 9.1 [NP]   2316 No leukocytosis    WBC: 10.66 [AB]      ED Course User Index  [AB] Elias Garcia MD  [NP] Jinny Rios MD                  Clinical Impression:       ICD-10-CM ICD-9-CM   1. Malignant neoplasm of  pancreas, unspecified location of malignancy  C25.9 157.9   2. Fever  R50.9 780.60   3. Transaminitis  R74.01 790.4   4. Hypomagnesemia  E83.42 275.2   5. Hypophosphatasia  E83.39 275.3                          ED Disposition Condition    Admit                             Jinny Rios MD  Resident  11/16/20 2349       Elias Garcia MD  11/17/20 0033

## 2020-11-17 NOTE — PLAN OF CARE
Pt is reported to oncology floor room 801 nurse .   Pt is AAOx4,Pt turns and repositions independently No skin breakdown noted. Pt pain and safety monitored q 1-2 hrs this shift . Bed locked and in lowest position. Rails elevated x 3. Brakes on. Call light and personal belongings in reach. Will continue monitor.

## 2020-11-17 NOTE — ED NOTES
Report received from ROSA Obrien. Care assumed. Pt returned from CT scan at this time. Pt changed into hospital gown. Limb alert band placed on LUE. Fall risk and allergies reviewed with patient. Fall risk band and allergy band applied to LUE. Pt personal belongings labeled and placed at bedside with family member. NAD noted at this time. Respirations even and unlabored. Pt assisted with repositioning in stretcher. Pt remains on cardiac monitor, automated BP cuff, and pulse oximeter. Pt provided with pillow for comfort and educated on indication for leight weight clothing and bedding due to fever. Pt verbalized understanding. Pt educated on need for urine sample and denies need to void at this time. Call bell within reach and pt verbalized she mejia call for assistance if needed. SR raised x2, bed locked and low. Pt's family member to remain at bedside.

## 2020-11-17 NOTE — ASSESSMENT & PLAN NOTE
Creatinine 3.4 on admit which appears to be her baseline    - Avoid nephrotoxic agents (patient with reported IV contrast allergy as well)  - renally dose all medications

## 2020-11-17 NOTE — ASSESSMENT & PLAN NOTE
Patient with history of multiple cancers, most recently for right lung adenocarcinoma with metastatic disease. Most recent PET concerning for progression of disease with increased metabolic activity in multiple nodules in bilateral lungs. Patient's lab abnormalities have kept her Tarceva on home, plan was to transition to Afatinib if patient could not tolerate Tarceva.    Receives outpatient fluid and electrolyte infusions at oncology infusion center

## 2020-11-17 NOTE — ED TRIAGE NOTES
Arrived via EMS from home, N/V, fever 101.3, chills, dry cough, dizziness feels like the room is spinning, denies CP, SOB, diarrhea,.    LOC: The patient is awake, alert, and oriented to place, time, situation. Affect is appropriate.  Speech is appropriate and clear.     APPEARANCE: Patient resting comfortably in no acute distress.  Patient is clean and well groomed.    SKIN: The skin is warm and dry; color consistent with ethnicity.  Patient has normal skin turgor and moist mucus membranes.  Skin intact; no breakdown or bruising noted.     MUSCULOSKELETAL: Patient moving upper and lower extremities without difficulty. Generalized  weakness.     RESPIRATORY: Airway is open and patent. Respirations spontaneous, even, easy, and non-labored.  Patient has a normal effort and rate.  No accessory muscle use noted. Dry cough.     CARDIAC:  Normal rhythm and rate noted.  No peripheral edema noted. No complaints of chest pain.      ABDOMEN: Soft and non tender to palpation.  No distention noted.     NEUROLOGIC: Eyes open spontaneously.  Behavior appropriate to situation.  Follows commands; facial expression symmetrical.  Purposeful motor response noted; normal sensation in all extremities.

## 2020-11-17 NOTE — ASSESSMENT & PLAN NOTE
On amlodipine 5mg qd at home    - Normotensive at this time, will hold in setting of sepsis  - Resume as clinically appropriate

## 2020-11-17 NOTE — ASSESSMENT & PLAN NOTE
Patient presenting with fever and tachycardia. Received 250cc fluid bolus and vancomycin and cefepime in the ED. Patient's only complaints are vertigo-like symptoms which could be secondary to dehydration / sepsis as well. CT head negative for any acute abnormality. CXR with no focal consolidation. She denies dysuria however UA with 2+ leukocytes and few bacteria on microscopy. Also patient complaining of 2 episodes of vomiting with an associated pain radiating to the back. Patient is s/p Whipple procedure, symptoms concerning for potential pancreatitis of remnant pancreas. Patient also with increase in LFT's and bili, concerning for potential hepatic abscess vs. Metastatic disease or external obstructive process.    Plan:  Antibiotics: Transitioned cefepime to zosyn for better anaerobic coverage, d/c'ed vancomycin as low suspicion for MRSA  Fluids: 250cc in ED, will give additional 1L LR bolus for 30cc/kg crystalloid resuscitation  Source: Intra-abdominal vs. UTI vs. pancreatitis    - Blood cultures pending  - Urine culture pending  - RUQ U/S with doppler pending  - Lipase added on, however unclear how much clinical benefit it could provide in light of patient's surgical history. If negative would point away from pancreatitis, patient's natural history also not consistent with pancreatitis

## 2020-11-17 NOTE — H&P
"[facility]  Hematology/Oncology  H&P    Patient Name: Naty St  MRN: [id number]  Admission Date: 11/16/2020  Code Status: DNR   Attending Provider: Maurisio Mooney MD  Primary Care Physician: Olvin Mars MD  Principal Problem:Sepsis    Subjective:     HPI: Mrs. St is a 75 year old lady with a past medical history significant for HTN, DVT, metastatic R lung adenocarcinoma, breast cancer s/p L lumpectomy, meningioma s/p resection, and remote pancreatic cancer s/p whipple who presented to [facility] ED the evening of 11/16/2020 for evaluation of weakness, chills, and dizziness. The patient initially presented with vertigo symptoms stating she was unable to walk due to feeling the "room spinning." She had one prior episode 1 year ago but it was never further evaluated. The patient denies any recent cough, cold, nausea, or diarrhea. She does endorse 2 episodes of vomiting immediately after eating. One was day of presentation and the other was a few days before. She was able to eat normally in between with no symptoms. She denies any sick contacts, she lives with her  and her son visits occasionally. She has associated abdominal pain that is described as radiating to the back and sides of her upper abdomen. Pain is not present when she is not having episodes of vomiting. She endorses decreased PO intake over the past few days but has been able to tolerate water and some food. The patient is accompanied by her  that provides supplemental history. In the ED the patient was persistently febrile and tachycardic. Chest X-ray did not show any acute consolidation and her CT head showed stable post-surgical changes and no acute process. She was admitted to Medical Oncology for further management.    Oncologic History (Per primary oncologist notes):  Initially seen in 1/2016Marie has a history of pancreatic cancer 17 years ago, breast cancer and meningioma, presented to the " "[facility] complaining of loss of consciousness. Had a meningiomaresection done in the past; however underwent neurosurgical resection with Dr. 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    Additional immunohistochemical stains were performed which revealed the tumor cells to be are positive for TTF1 and negative for ER and GCDFP. The morphology and TTF1 positivity are most consistent with lung primary.    Of note, imaging scan at the end of January 2016 revealed a mass in the lung at 2 cm in the medial aspect of the apical segment of the right upper lobe abutting the mediastinum at the level of the azygous vein and abutting and possibly encasing the segmental bronchi and vessels of the apical segment of the right upper lobe and no pleural fluid was present. Also, there is an enlarged right paratracheal lymph node. No evidence of any metastatic disease at the pancreatic site with postoperative changes post Whipple disease   PET Scan from 2/15/16 showed "Hypermetabolic mass in the right lung apex consistent with a primary malignancy. Hypermetabolic mediastinal lymph nodes consistent with metastatic disease. Right sacral hypermetabolic lesion consistent with metastatic disease, noting additional mildly sclerotic lesions in multiple vertebral bodies which do not demonstrate abnormal hypermetabolism  She underwent IR bone biopsy which revealed metastatic adenocarcinoma of lung origin. 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  She has completed focal RT to brain lesions in March 2016.    PET scan from 6/6/16 shows "Dramatic almost complete response to therapy."  Lovenox started after US lower ext 6/7/16 shows Acute complete occlusion of one of the left posterior tibial vein.Remote partial thrombus of the proximal left superficial femoral vein.  She is on Tarceva.   12/6/16 PET scan reveals "Stable right upper " "lobe lesion and right hilar lymph node. No new lesions identified. Trace left pleural effusion".  1/27/17 Renal u/s "Medical renal disease. Nonobstructive right nephrolithiasis"  1/31/17 PET - "Right upper lobe nodule and right hilar lymph node similar and very low grade activity.  There is no definite evidence of recurrence."   11/9/17 PET "In this patient with history of lung cancer, there is interval increase in size and hypermetabolism of a right upper lobe lung lesion concerning for recurrent disease. Additionally, there is interval appearance of a hypermetabolic paratracheal lymph node suspicious for metastatic disease"     She progressed on Tarceva She underwent EBUS on 12/5/17. Pathology revealed adenocarcinoma but T790m could not be done as quantity was not insufficient. Her PET scan from 1/30/18 revealed The patient's previously identified abnormal lesions is slightly greater uptake of FDG on today's study compared with prior exam. These findings are concerning for progression of disease"     She discontinued Tagrisso in April 2018. Restaging scans from 6/4/18 revealed "Stable appearance of a spiculated right upper lobe pulmonary lesion.  Additional irregular focus superior to the lesion in the right lung apex is stable and may represent scar or additional lesion; although this was not hypermetabolic on prior PET-CT.  No new pulmonary lesions. 1.3 cm subcarinal lymph node, not hypermetabolic on prior PET-CT. Stable postsurgical changes of a Whipple procedure. Bilateral nonobstructing nephrolithiasis.  Stable sclerotic focus in the T5 vertebral body, possibly a bone island as this was not hypermetabolic on prior PET-CT. Small hiatal hernia. RECIST SUMMARY: Date of prior examination for comparison 01/30/2018 Lesion 1: Right upper lobe 1.3 cm Series 2 image 31 prior measurement 1.3 cm"  Bone scan also from 6/4/18 revealed no evidence of mets.     Her PET scan from 7/11/18 revealed "Right suprahilar lesion SUV " "max 3.76, previously 6.86. Pretracheal lymph node SUV max 2.55, previously 3.79. There is physiologic intracranial, head, and neck activity.  There is muscle activation.  There is vocal cord activation.  There is physiologic liver, spleen, GI and  activity.  There is a hiatal hernia.  Pelvic organs show nothing unusual.  No bone lesions are seen.  There are areas of benign appearing lung scar"  She notes fatigue.  She denies any nausea, vomiting, diarrhea, constipation, abdominal pain, weight loss or loss of appetite, chest pain, shortness of breath, leg swelling, fatigue, pain, headache, dizziness, or mood changes. Her ECOG PS is 2. She is accompanied by her  and son     She has been on Tarceva since 6/5/18. She completed SBRT to her right lung lesions.      Restaging PET scan from 10/15/2020 reveals "In this patient with history of lung cancer, the previously described hypermetabolic right paratracheal and right hilar lesions appears stable in regards to metabolic activity compared to prior exam.  However, there has been interval increase in size and metabolic activity of additional pulmonary nodules scattered within both lungs, as further detailed above.  Findings concerning for disease progression/metastases.zAdditional findings as above"  She was hospitalized from 10/15-10/17/2020 with renal failure, Tarceva was held.       Oncology Treatment Plan:   [No treatment plan]    Medications:  Continuous Infusions:  Scheduled Meds:   amitriptyline  50 mg Oral QHS    apixaban  5 mg Oral BID    levETIRAcetam  500 mg Oral BID    lipase-protease-amylase 12,000-38,000-60,000 units  1 capsule Oral TID WM    piperacillin-tazobactam (ZOSYN) IVPB  4.5 g Intravenous Q12H    polyethylene glycol  17 g Oral Daily     PRN Meds:albuterol-ipratropium, dextrose 50%, dextrose 50%, glucagon (human recombinant), glucose, glucose, LORazepam, melatonin, melatonin, ondansetron, sodium chloride 0.9%, sodium chloride 0.9% "     Review of patient's allergies indicates:   Allergen Reactions    Tobradex [tobramycin-dexamethasone] Swelling    Iodinated contrast media Hives    Latex Rash and Hives    Phenytoin sodium extended Other (See Comments) and Rash        Past Medical History:   Diagnosis Date    Anticoagulant long-term use     Blood clot in vein 06/2016    Breast cancer 1994    Cataract     Hypertension     Lung cancer     Lung cancer metastatic to brain     Pancreatic cancer 1998    Posterior capsular opacification, left eye     Psychiatric problem     Seizures 01/25/2016    Stroke     Therapy      Past Surgical History:   Procedure Laterality Date    BONE BIOSPY  3/9/16    BRAIN SURGERY  1/12/16    tumor removal 1/12/16    BREAST LUMPECTOMY  1998    left    CATARACT EXTRACTION Bilateral 2004    yag OU    CHOLECYSTECTOMY  1998    COLONOSCOPY N/A 11/13/2015    Procedure: COLONOSCOPY;  Surgeon: Alex Carmona MD;  Location: [facility] (4TH FLR);  Service: Endoscopy;  Laterality: N/A;  2 year f/u    COLONOSCOPY N/A 11/7/2018    Procedure: COLONOSCOPY;  Surgeon: Jim Raman MD;  Location: [facility] (4TH FLR);  Service: Endoscopy;  Laterality: N/A;  Eliquis - per Dr. George santiago to hold Eliquis x 2 days prior to colon- ERW    COLONOSCOPY N/A 6/2/2020    Procedure: COLONOSCOPY;  Surgeon: Darius Medina MD;  Location: [facility] (2ND FLR);  Service: Endoscopy;  Laterality: N/A;  Schedule tomorrow  patient rescheduled due to poor bowel prep-BB  rapid covid test completed on 6/1/20 per ROQUE Zabala, email sent to Endo Staff-BB  updated instructions emailed to patient-BB  Latex allergy  approval to hold Eliquis received, see telephone encounter 5/18/20-BB      cysto and right ureteral stent  4/27/12    ecoli  2010    removal of blockage, done throat, then through the liver.    ESOPHAGOGASTRODUODENOSCOPY N/A 6/2/2020    Procedure: EGD (ESOPHAGOGASTRODUODENOSCOPY);  Surgeon: Darius Medina MD;  Location: [facility]  ENDO (2ND FLR);  Service: Endoscopy;  Laterality: N/A;  Schedule tomorrow  patient rescheduled due to poor bowel prep-BB  rapid covid test completed on 20 per ROQUE Zabala, email sent to Endo Staff-BB  updated instructions emailed to patient-BB  Latex allergy  approval to hold Eliquis received, see telephone     HYSTERECTOMY  1974    KIDNEY STONE SURGERY  --laser    left eswl  12    OOPHORECTOMY  2012    Laparoscopic BSO, lysis of adhesions, cystoscopy greater than 35mins     WHIPPLE PROCEDURE W/ LAPAROSCOPY       Family History     Problem Relation (Age of Onset)    Breast cancer Mother    Leukemia Paternal Grandfather    Lung cancer Mother    Stomach cancer Paternal Grandmother        Tobacco Use    Smoking status: Former Smoker     Packs/day: 0.00     Years: 0.00     Pack years: 0.00     Quit date: 1961     Years since quittin.1    Smokeless tobacco: Never Used   Substance and Sexual Activity    Alcohol use: No    Drug use: No    Sexual activity: Not Currently     Partners: Male     Birth control/protection: See Surgical Hx       Review of Systems   Constitutional: Positive for chills, fatigue and fever. Negative for unexpected weight change.   HENT: Negative for ear pain, facial swelling, sneezing and sore throat.    Eyes: Negative for visual disturbance.   Respiratory: Negative for cough, shortness of breath and wheezing.    Cardiovascular: Positive for leg swelling. Negative for chest pain and palpitations.   Gastrointestinal: Negative for abdominal pain, constipation, diarrhea, nausea and vomiting.   Genitourinary: Negative for difficulty urinating, dysuria, enuresis and flank pain.   Musculoskeletal: Negative for arthralgias and myalgias.   Skin: Negative for pallor and rash.   Neurological: Positive for light-headedness. Negative for weakness and headaches.   Hematological: Negative for adenopathy.   Psychiatric/Behavioral: Negative for confusion.     Objective:     Vital  Signs (Most Recent):  Temp: 98.2 °F (36.8 °C) (11/17/20 0353)  Pulse: 79 (11/17/20 0300)  Resp: 14 (11/17/20 0143)  BP: 135/77 (11/17/20 0132)  SpO2: 99 % (11/17/20 0143) Vital Signs (24h Range):  Temp:  [98.2 °F (36.8 °C)-101.3 °F (38.5 °C)] 98.2 °F (36.8 °C)  Pulse:  [] 79  Resp:  [14-18] 14  SpO2:  [97 %-100 %] 99 %  BP: (134-155)/(65-96) 135/77     Weight: 61.2 kg (135 lb)  Body mass index is 21.79 kg/m².  Body surface area is 1.69 meters squared.      Intake/Output Summary (Last 24 hours) at 11/17/2020 0439  Last data filed at 11/17/2020 0141  Gross per 24 hour   Intake 620 ml   Output 300 ml   Net 320 ml       Physical Exam  Vitals signs and nursing note reviewed.   Constitutional:       General: She is not in acute distress.     Appearance: She is well-developed.      Comments: Frail appearing   HENT:      Head: Normocephalic and atraumatic.      Nose: Nose normal.      Mouth/Throat:      Mouth: Mucous membranes are dry.      Pharynx: No oropharyngeal exudate or posterior oropharyngeal erythema.   Eyes:      General: No scleral icterus.     Extraocular Movements: Extraocular movements intact.      Conjunctiva/sclera: Conjunctivae normal.      Pupils: Pupils are equal, round, and reactive to light.   Neck:      Musculoskeletal: Normal range of motion and neck supple.   Cardiovascular:      Rate and Rhythm: Normal rate and regular rhythm.      Pulses: Normal pulses.      Heart sounds: Normal heart sounds. No murmur. No friction rub.   Pulmonary:      Effort: Pulmonary effort is normal. No respiratory distress.      Breath sounds: Normal breath sounds. No wheezing or rales.   Abdominal:      General: Bowel sounds are normal. There is no distension.      Palpations: Abdomen is soft.      Tenderness: There is no abdominal tenderness. There is no guarding or rebound.   Musculoskeletal: Normal range of motion.      Right lower leg: Edema present.      Left lower leg: Edema present.   Lymphadenopathy:       Cervical: No cervical adenopathy.   Skin:     General: Skin is warm and dry.      Coloration: Skin is not pale.      Findings: No erythema.   Neurological:      General: No focal deficit present.      Mental Status: She is alert and oriented to person, place, and time.      Cranial Nerves: No cranial nerve deficit.   Psychiatric:         Mood and Affect: Mood normal.         Behavior: Behavior normal.         Significant Labs:   CBC:   Recent Labs   Lab 11/16/20  2204   WBC 10.66   HGB 9.1*   HCT 29.9*      , CMP:   Recent Labs   Lab 11/16/20  2204      K 4.5   *   CO2 13*      BUN 39*   CREATININE 3.4*   CALCIUM 8.8   PROT 7.0   ALBUMIN 3.1*   BILITOT 2.1*   ALKPHOS 527*   *   *   ANIONGAP 12   EGFRNONAA 12.5*   , Urine Studies:   Recent Labs   Lab 11/17/20  0040   COLORU Yellow   APPEARANCEUA Clear   PHUR 5.0   SPECGRAV 1.010   PROTEINUA Negative   GLUCUA Negative   KETONESU Negative   BILIRUBINUA Negative   OCCULTUA Negative   NITRITE Negative   LEUKOCYTESUR 2+*   RBCUA 1   WBCUA 15*   BACTERIA Few*   SQUAMEPITHEL 4    and All pertinent labs from the last 24 hours have been reviewed.    Diagnostic Results:  I have reviewed all pertinent imaging results/findings within the past 24 hours.   Narrative & Impression     EXAMINATION:  XR CHEST AP PORTABLE     CLINICAL HISTORY:  Sepsis;     TECHNIQUE:  Single frontal view of the chest was performed.     COMPARISON:  02/24/2020     FINDINGS:  Cardiac monitoring leads overlie the chest.  The cardiomediastinal silhouette is stable.  The lungs are symmetrically expanded without evidence of confluent airspace consolidation, significant volume of pleural fluid or pneumothorax.  Visualized osseous structures are intact.  Surgical clips project over the left axilla.     Impression:     No acute intrathoracic abnormality identified on this single radiographic view of the chest.        Electronically signed by: Susie Case MD  Date:                                             11/16/2020  Time:                                           22:31     Narrative & Impression     EXAMINATION:  CT HEAD WITHOUT CONTRAST     CLINICAL HISTORY:  falls, dizziness;     TECHNIQUE:  Low dose axial images were obtained through the head.  Coronal and sagittal reformations were also performed. Contrast was not administered.     COMPARISON:  CTA head neck and MRI brain 02/24/2020,     FINDINGS:  There is no acute intracranial hemorrhage, hydrocephalus, midline shift or mass effect. There is postoperative change of bifrontal craniotomy with a stable region of hypoattenuation/encephalomalacia in the left frontal lobe presumably relating to prior intra-axial mass resection.  Findings appear similar when compared to prior examinations noting definitive evaluation for recurrence/residual lesion is limited on today's noncontrast CT exam.  There are additional small regions of hypoattenuation involving the right cerebellum, similar to prior study and suggestive of prior infarct.  Gray-white matter differentiation otherwise appears maintained.  Basal cisterns are patent.  There is trace mucosal thickening/fluid in the left maxillary antrum.  Trace mucosal thickening of the inferior left frontal sinus and anterior left ethmoid air cell.  The mastoid air cells are essentially clear.     Impression:     1. No CT evidence of acute intracranial abnormality. Clinical correlation and further evaluation as warranted.  2. Postoperative change of prior bifrontal craniotomy with left frontal lobe hypoattenuation/encephalomalacia unchanged from prior studies.  3. Hypoattenuation/encephalomalacia in the right cerebellum, similar to prior exam and suggestive of prior infarction.  4. Trace mucosal thickening/fluid in the left maxillary antrum and trace mucosal thickening of the inferior left frontal sinus and anterior left ethmoid air cell.  Correlation for sinusitis  "advised.        Electronically signed by: Susie Case MD  Date:                                            11/16/2020  Time:                                           22:58         Assessment/Plan:     * Sepsis  Patient presenting with fever and tachycardia. Received 250cc fluid bolus and vancomycin and cefepime in the ED. Patient's only complaints are vertigo-like symptoms which could be secondary to dehydration / sepsis as well. CT head negative for any acute abnormality. CXR with no focal consolidation. She denies dysuria however UA with 2+ leukocytes and few bacteria on microscopy. Also patient complaining of 2 episodes of vomiting with an associated pain radiating to the back. Patient is s/p Whipple procedure, symptoms concerning for potential pancreatitis of remnant pancreas. Patient also with increase in LFT's and bili, concerning for potential hepatic abscess vs. Metastatic disease or external obstructive process.    Plan:  Antibiotics: Transitioned cefepime to zosyn for better anaerobic coverage, d/c'ed vancomycin as low suspicion for MRSA  Fluids: 250cc in ED, will give additional 1L LR bolus for 30cc/kg crystalloid resuscitation  Source: Intra-abdominal vs. UTI vs. pancreatitis    - Blood cultures pending  - Urine culture pending  - RUQ U/S with doppler pending  - Lipase added on, however unclear how much clinical benefit it could provide in light of patient's surgical history. If negative would point away from pancreatitis, patient's natural history also not consistent with pancreatitis    Transaminitis  Please see "sepsis" above    Malignant neoplasm of lower lobe of right lung  Patient with history of multiple cancers, most recently for right lung adenocarcinoma with metastatic disease. Most recent PET concerning for progression of disease with increased metabolic activity in multiple nodules in bilateral lungs. Patient's lab abnormalities have kept her Tarceva on home, plan was to transition to " "Afatinib if patient could not tolerate Tarceva.    Receives outpatient fluid and electrolyte infusions at oncology infusion center    UTI (urinary tract infection)  Please see "sepsis" above    Seizure disorder  Continuing home keppra 500mg bid    Chronic kidney disease (CKD) stage G4/A3, severely decreased glomerular filtration rate (GFR) between 15-29 mL/min/1.73 square meter and albuminuria creatinine ratio greater than 300 mg/g  Creatinine 3.4 on admit which appears to be her baseline    - Avoid nephrotoxic agents (patient with reported IV contrast allergy as well)  - renally dose all medications    Adjustment disorder with depressed mood  Patient reports taking amitryptiline nightly and ativan nightly PRN    - Would prefer to avoid these medications in an elderly patient however they are chronic so will continue while inpatient    OAB (overactive bladder)  Patient on Myrbetriq at home, not on forumulary    - Could potentially switch to oxybutinin while inpatient, restart as clinically appropriate    Meningioma  S/p resection with NSGY. CT head on presentation with stable post-surgical findings.    - Continuing home keppra 500mg bid    Essential hypertension  On amlodipine 5mg qd at home    - Normotensive at this time, will hold in setting of sepsis  - Resume as clinically appropriate        Maximino Welch MD  Hematology/Oncology  [facility]      "

## 2020-11-17 NOTE — ASSESSMENT & PLAN NOTE
Patient reports taking amitryptiline nightly and ativan nightly PRN    - Would prefer to avoid these medications in an elderly patient however they are chronic so will continue while inpatient

## 2020-11-17 NOTE — ASSESSMENT & PLAN NOTE
Patient on Myrbetriq at home, not on forumulary    - Could potentially switch to oxybutinin while inpatient, restart as clinically appropriate

## 2020-11-17 NOTE — SUBJECTIVE & OBJECTIVE
Oncology Treatment Plan:   [No treatment plan]    Medications:  Continuous Infusions:  Scheduled Meds:   amitriptyline  50 mg Oral QHS    apixaban  5 mg Oral BID    levETIRAcetam  500 mg Oral BID    lipase-protease-amylase 12,000-38,000-60,000 units  1 capsule Oral TID WM    piperacillin-tazobactam (ZOSYN) IVPB  4.5 g Intravenous Q12H    polyethylene glycol  17 g Oral Daily     PRN Meds:albuterol-ipratropium, dextrose 50%, dextrose 50%, glucagon (human recombinant), glucose, glucose, LORazepam, melatonin, melatonin, ondansetron, sodium chloride 0.9%, sodium chloride 0.9%     Review of patient's allergies indicates:   Allergen Reactions    Tobradex [tobramycin-dexamethasone] Swelling    Iodinated contrast media Hives    Latex Rash and Hives    Phenytoin sodium extended Other (See Comments) and Rash        Past Medical History:   Diagnosis Date    Anticoagulant long-term use     Blood clot in vein 06/2016    Breast cancer 1994    Cataract     Hypertension     Lung cancer     Lung cancer metastatic to brain     Pancreatic cancer 1998    Posterior capsular opacification, left eye     Psychiatric problem     Seizures 01/25/2016    Stroke     Therapy      Past Surgical History:   Procedure Laterality Date    BONE BIOSPY  3/9/16    BRAIN SURGERY  1/12/16    tumor removal 1/12/16    BREAST LUMPECTOMY  1998    left    CATARACT EXTRACTION Bilateral 2004    yag OU    CHOLECYSTECTOMY  1998    COLONOSCOPY N/A 11/13/2015    Procedure: COLONOSCOPY;  Surgeon: Alex Carmona MD;  Location: [address]);  Service: Endoscopy;  Laterality: N/A;  2 year f/u    COLONOSCOPY N/A 11/7/2018    Procedure: COLONOSCOPY;  Surgeon: Jim Raman MD;  Location: [address]);  Service: Endoscopy;  Laterality: N/A;  Eliquis - per Dr. George santiago to hold Eliquis x 2 days prior to colon- ERW    COLONOSCOPY N/A 6/2/2020    Procedure: COLONOSCOPY;  Surgeon: Darius Medina MD;  Location: [facility] ([facility]);  Service: Endoscopy;  Laterality: N/A;  Schedule tomorrow  patient rescheduled due to poor bowel prep-BB  rapid covid test completed on 20 per ROQUE Zabala, email sent to Endo Staff-BB  updated instructions emailed to patient-BB  Latex allergy  approval to hold Eliquis received, see telephone encounter 20-BB      cysto and right ureteral stent  12    ecoli      removal of blockage, done throat, then through the liver.    ESOPHAGOGASTRODUODENOSCOPY N/A 2020    Procedure: EGD (ESOPHAGOGASTRODUODENOSCOPY);  Surgeon: Darius Medina MD;  Location: [facility] ([address] [place] [address]);  Service: Endoscopy;  Laterality: N/A;  Schedule tomorrow  patient rescheduled due to poor bowel prep-BB  rapid covid test completed on 20 per ROQUE Zabala, email sent to Endo Staff-BB  updated instructions emailed to patient-BB  Latex allergy  approval to hold Eliquis received, see telephone     HYSTERECTOMY  1974    KIDNEY STONE SURGERY  --laser    left eswl  12    OOPHORECTOMY  2012    Laparoscopic BSO, lysis of adhesions, cystoscopy greater than 35mins     WHIPPLE PROCEDURE W/ LAPAROSCOPY       Family History     Problem Relation (Age of Onset)    Breast cancer Mother    Leukemia Paternal Grandfather    Lung cancer Mother    Stomach cancer Paternal Grandmother        Tobacco Use    Smoking status: Former Smoker     Packs/day: 0.00     Years: 0.00     Pack years: 0.00     Quit date: 1961     Years since quittin.1    Smokeless tobacco: Never Used   Substance and Sexual Activity    Alcohol use: No    Drug use: No    Sexual activity: Not Currently     Partners: Male     Birth control/protection: See Surgical Hx       Review of Systems   Constitutional: Positive for chills, fatigue and fever. Negative for unexpected weight change.   HENT: Negative for ear pain, facial swelling, sneezing and sore throat.    Eyes: Negative for visual disturbance.   Respiratory: Negative for cough, shortness of  breath and wheezing.    Cardiovascular: Positive for leg swelling. Negative for chest pain and palpitations.   Gastrointestinal: Negative for abdominal pain, constipation, diarrhea, nausea and vomiting.   Genitourinary: Negative for difficulty urinating, dysuria, enuresis and flank pain.   Musculoskeletal: Negative for arthralgias and myalgias.   Skin: Negative for pallor and rash.   Neurological: Positive for light-headedness. Negative for weakness and headaches.   Hematological: Negative for adenopathy.   Psychiatric/Behavioral: Negative for confusion.     Objective:     Vital Signs (Most Recent):  Temp: 98.2 °F (36.8 °C) (11/17/20 0353)  Pulse: 79 (11/17/20 0300)  Resp: 14 (11/17/20 0143)  BP: 135/77 (11/17/20 0132)  SpO2: 99 % (11/17/20 0143) Vital Signs (24h Range):  Temp:  [98.2 °F (36.8 °C)-101.3 °F (38.5 °C)] 98.2 °F (36.8 °C)  Pulse:  [] 79  Resp:  [14-18] 14  SpO2:  [97 %-100 %] 99 %  BP: (134-155)/(65-96) 135/77     Weight: 61.2 kg (135 lb)  Body mass index is 21.79 kg/m².  Body surface area is 1.69 meters squared.      Intake/Output Summary (Last 24 hours) at 11/17/2020 0439  Last data filed at 11/17/2020 0141  Gross per 24 hour   Intake 620 ml   Output 300 ml   Net 320 ml       Physical Exam  Vitals signs and nursing note reviewed.   Constitutional:       General: She is not in acute distress.     Appearance: She is well-developed.      Comments: Frail appearing   HENT:      Head: Normocephalic and atraumatic.      Nose: Nose normal.      Mouth/Throat:      Mouth: Mucous membranes are dry.      Pharynx: No oropharyngeal exudate or posterior oropharyngeal erythema.   Eyes:      General: No scleral icterus.     Extraocular Movements: Extraocular movements intact.      Conjunctiva/sclera: Conjunctivae normal.      Pupils: Pupils are equal, round, and reactive to light.   Neck:      Musculoskeletal: Normal range of motion and neck supple.   Cardiovascular:      Rate and Rhythm: Normal rate and regular  rhythm.      Pulses: Normal pulses.      Heart sounds: Normal heart sounds. No murmur. No friction rub.   Pulmonary:      Effort: Pulmonary effort is normal. No respiratory distress.      Breath sounds: Normal breath sounds. No wheezing or rales.   Abdominal:      General: Bowel sounds are normal. There is no distension.      Palpations: Abdomen is soft.      Tenderness: There is no abdominal tenderness. There is no guarding or rebound.   Musculoskeletal: Normal range of motion.      Right lower leg: Edema present.      Left lower leg: Edema present.   Lymphadenopathy:      Cervical: No cervical adenopathy.   Skin:     General: Skin is warm and dry.      Coloration: Skin is not pale.      Findings: No erythema.   Neurological:      General: No focal deficit present.      Mental Status: She is alert and oriented to person, place, and time.      Cranial Nerves: No cranial nerve deficit.   Psychiatric:         Mood and Affect: Mood normal.         Behavior: Behavior normal.         Significant Labs:   CBC:   Recent Labs   Lab 11/16/20  2204   WBC 10.66   HGB 9.1*   HCT 29.9*      , CMP:   Recent Labs   Lab 11/16/20  2204      K 4.5   *   CO2 13*      BUN 39*   CREATININE 3.4*   CALCIUM 8.8   PROT 7.0   ALBUMIN 3.1*   BILITOT 2.1*   ALKPHOS 527*   *   *   ANIONGAP 12   EGFRNONAA 12.5*   , Urine Studies:   Recent Labs   Lab 11/17/20  0040   COLORU Yellow   APPEARANCEUA Clear   PHUR 5.0   SPECGRAV 1.010   PROTEINUA Negative   GLUCUA Negative   KETONESU Negative   BILIRUBINUA Negative   OCCULTUA Negative   NITRITE Negative   LEUKOCYTESUR 2+*   RBCUA 1   WBCUA 15*   BACTERIA Few*   SQUAMEPITHEL 4    and All pertinent labs from the last 24 hours have been reviewed.    Diagnostic Results:  I have reviewed all pertinent imaging results/findings within the past 24 hours.   Narrative & Impression     EXAMINATION:  XR CHEST AP PORTABLE     CLINICAL HISTORY:  Sepsis;     TECHNIQUE:  Single  frontal view of the chest was performed.     COMPARISON:  02/24/2020     FINDINGS:  Cardiac monitoring leads overlie the chest.  The cardiomediastinal silhouette is stable.  The lungs are symmetrically expanded without evidence of confluent airspace consolidation, significant volume of pleural fluid or pneumothorax.  Visualized osseous structures are intact.  Surgical clips project over the left axilla.     Impression:     No acute intrathoracic abnormality identified on this single radiographic view of the chest.        Electronically signed by: Susie Case MD  Date:                                            11/16/2020  Time:                                           22:31     Narrative & Impression     EXAMINATION:  CT HEAD WITHOUT CONTRAST     CLINICAL HISTORY:  falls, dizziness;     TECHNIQUE:  Low dose axial images were obtained through the head.  Coronal and sagittal reformations were also performed. Contrast was not administered.     COMPARISON:  CTA head neck and MRI brain 02/24/2020,     FINDINGS:  There is no acute intracranial hemorrhage, hydrocephalus, midline shift or mass effect. There is postoperative change of bifrontal craniotomy with a stable region of hypoattenuation/encephalomalacia in the left frontal lobe presumably relating to prior intra-axial mass resection.  Findings appear similar when compared to prior examinations noting definitive evaluation for recurrence/residual lesion is limited on today's noncontrast CT exam.  There are additional small regions of hypoattenuation involving the right cerebellum, similar to prior study and suggestive of prior infarct.  Gray-white matter differentiation otherwise appears maintained.  Basal cisterns are patent.  There is trace mucosal thickening/fluid in the left maxillary antrum.  Trace mucosal thickening of the inferior left frontal sinus and anterior left ethmoid air cell.  The mastoid air cells are essentially clear.     Impression:     1. No CT  evidence of acute intracranial abnormality. Clinical correlation and further evaluation as warranted.  2. Postoperative change of prior bifrontal craniotomy with left frontal lobe hypoattenuation/encephalomalacia unchanged from prior studies.  3. Hypoattenuation/encephalomalacia in the right cerebellum, similar to prior exam and suggestive of prior infarction.  4. Trace mucosal thickening/fluid in the left maxillary antrum and trace mucosal thickening of the inferior left frontal sinus and anterior left ethmoid air cell.  Correlation for sinusitis advised.        Electronically signed by: Susie Case MD  Date:                                            11/16/2020  Time:                                           22:58

## 2020-11-17 NOTE — TELEPHONE ENCOUNTER
Appears she is admitted.   Creatinine still at 3.4---  Once she is DC'd----plan is to reassess as a hospital f/u to possibly start afatinib?  Just trying to knock this call out in one try.  ~wen

## 2020-11-17 NOTE — HPI
"Mrs. St is a 75 year old lady with a past medical history significant for HTN, DVT, metastatic R lung adenocarcinoma, breast cancer s/p L lumpectomy, meningioma s/p resection, and remote pancreatic cancer s/p whipple who presented to [facility] ED the evening of 11/16/2020 for evaluation of weakness, chills, and dizziness. The patient initially presented with vertigo symptoms stating she was unable to walk due to feeling the "room spinning." She had one prior episode 1 year ago but it was never further evaluated. The patient denies any recent cough, cold, nausea, or diarrhea. She does endorse 2 episodes of vomiting immediately after eating. One was day of presentation and the other was a few days before. She was able to eat normally in between with no symptoms. She denies any sick contacts, she lives with her  and her son visits occasionally. She has associated abdominal pain that is described as radiating to the back and sides of her upper abdomen. Pain is not present when she is not having episodes of vomiting. She endorses decreased PO intake over the past few days but has been able to tolerate water and some food. The patient is accompanied by her  that provides supplemental history. In the ED the patient was persistently febrile and tachycardic. Chest X-ray did not show any acute consolidation and her CT head showed stable post-surgical changes and no acute process. She was admitted to Medical Oncology for further management.    Oncologic History (Per primary oncologist notes):  Initially seen in 1/2016Marie has a history of pancreatic cancer 17 years ago, breast cancer and meningioma, presented to the hospital complaining of loss of consciousness. Had a meningiomaresection done in the past; however underwent neurosurgical resection with Dr. 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 " "   Additional immunohistochemical stains were performed which revealed the tumor cells to be are positive for TTF1 and negative for ER and GCDFP. The morphology and TTF1 positivity are most consistent with lung primary.    Of note, imaging scan at the end of January 2016 revealed a mass in the lung at 2 cm in the medial aspect of the apical segment of the right upper lobe abutting the mediastinum at the level of the azygous vein and abutting and possibly encasing the segmental bronchi and vessels of the apical segment of the right upper lobe and no pleural fluid was present. Also, there is an enlarged right paratracheal lymph node. No evidence of any metastatic disease at the pancreatic site with postoperative changes post Whipple disease   PET Scan from 2/15/16 showed "Hypermetabolic mass in the right lung apex consistent with a primary malignancy. Hypermetabolic mediastinal lymph nodes consistent with metastatic disease. Right sacral hypermetabolic lesion consistent with metastatic disease, noting additional mildly sclerotic lesions in multiple vertebral bodies which do not demonstrate abnormal hypermetabolism  She underwent IR bone biopsy which revealed metastatic adenocarcinoma of lung origin. 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  She has completed focal RT to brain lesions in March 2016.    PET scan from 6/6/16 shows "Dramatic almost complete response to therapy."  Lovenox started after US lower ext 6/7/16 shows Acute complete occlusion of one of the left posterior tibial vein.Remote partial thrombus of the proximal left superficial femoral vein.  She is on Tarceva.   12/6/16 PET scan reveals "Stable right upper lobe lesion and right hilar lymph node. No new lesions identified. Trace left pleural effusion".  1/27/17 Renal u/s "Medical renal disease. Nonobstructive right nephrolithiasis"  1/31/17 PET - "Right upper lobe nodule and right hilar lymph node similar and very low grade activity.  There is no " "definite evidence of recurrence."   11/9/17 PET "In this patient with history of lung cancer, there is interval increase in size and hypermetabolism of a right upper lobe lung lesion concerning for recurrent disease. Additionally, there is interval appearance of a hypermetabolic paratracheal lymph node suspicious for metastatic disease"     She progressed on Tarceva She underwent EBUS on 12/5/17. Pathology revealed adenocarcinoma but T790m could not be done as quantity was not insufficient. Her PET scan from 1/30/18 revealed The patient's previously identified abnormal lesions is slightly greater uptake of FDG on today's study compared with prior exam. These findings are concerning for progression of disease"     She discontinued Tagrisso in April 2018. Restaging scans from 6/4/18 revealed "Stable appearance of a spiculated right upper lobe pulmonary lesion.  Additional irregular focus superior to the lesion in the right lung apex is stable and may represent scar or additional lesion; although this was not hypermetabolic on prior PET-CT.  No new pulmonary lesions. 1.3 cm subcarinal lymph node, not hypermetabolic on prior PET-CT. Stable postsurgical changes of a Whipple procedure. Bilateral nonobstructing nephrolithiasis.  Stable sclerotic focus in the T5 vertebral body, possibly a bone island as this was not hypermetabolic on prior PET-CT. Small hiatal hernia. RECIST SUMMARY: Date of prior examination for comparison 01/30/2018 Lesion 1: Right upper lobe 1.3 cm Series 2 image 31 prior measurement 1.3 cm"  Bone scan also from 6/4/18 revealed no evidence of mets.     Her PET scan from 7/11/18 revealed "Right suprahilar lesion SUV max 3.76, previously 6.86. Pretracheal lymph node SUV max 2.55, previously 3.79. There is physiologic intracranial, head, and neck activity.  There is muscle activation.  There is vocal cord activation.  There is physiologic liver, spleen, GI and  activity.  There is a hiatal hernia.  Pelvic " "organs show nothing unusual.  No bone lesions are seen.  There are areas of benign appearing lung scar"  She notes fatigue.  She denies any nausea, vomiting, diarrhea, constipation, abdominal pain, weight loss or loss of appetite, chest pain, shortness of breath, leg swelling, fatigue, pain, headache, dizziness, or mood changes. Her ECOG PS is 2. She is accompanied by her  and son     She has been on Tarceva since 6/5/18. She completed SBRT to her right lung lesions.      Restaging PET scan from 10/15/2020 reveals "In this patient with history of lung cancer, the previously described hypermetabolic right paratracheal and right hilar lesions appears stable in regards to metabolic activity compared to prior exam.  However, there has been interval increase in size and metabolic activity of additional pulmonary nodules scattered within both lungs, as further detailed above.  Findings concerning for disease progression/metastases.zAdditional findings as above"  She was hospitalized from 10/15-10/17/2020 with renal failure, Tarceva was held.    "

## 2020-11-17 NOTE — ED NOTES
Telemetry Verification   Patient placed on Telemetry Box  Verified with War Room  Box # [id number]   Monitor Tech    Rate 104   Rhythm Sinus Tachycardia

## 2020-11-18 NOTE — PROGRESS NOTES
[facility]  Psychology  Progress Note  Individual Psychotherapy (PhD/LCSW)    Patient Name: Naty St  MRN: [id number]    Patient Class: IP- Inpatient  Admission Date: 11/16/2020  Hospital Length of Stay: 2 days  Attending Physician: Maurisio Mooney MD  Primary Care Provider: Olvin Mars MD    Therapeutic Intervention: Met with patient (known to me).  Inpatient/Partial Hospital - Supportive psychotherapy 30 min - CPT Code 23358    Chief Complaint/Reason for Encounter: depression, adaptation to illness     Interval History and Content of Current Session: Patient discussed overall functioning with COVID-19 pandemic and recent decline in mood. Mood changes likely related to lack of social interaction and activities due to COVID (no exercise, no [employer], limited visits with family). She did feel much worse during the past week, but this is likely due to admit illness. Discussed future goals and return to outpatient therapy.    Risk Parameters:  Patient reports no suicidal ideation  Patient reports no homicidal ideation  Patient reports no self-injurious behavior  Patient reports no violent behavior    Verbal Deficits: None    Patient's response to intervention:  The patient's response to intervention is accepting, motivated.    Progress toward goals and other mental status changes:  The patient's progress toward goals is n/a.    Diagnostic Impression - Plan:     Adjustment disorder with depressed mood  Patient with increasing dysphoria since COVID-19 related restrictions.     Patient plans to return to outpatient treatment with me. (I will also see her again if her inpatient stay is extended)    Patient and son know how to reach me.        Treatment Plan:  · Target symptoms: depression  · Why chosen therapy is appropriate versus another modality: relevant to diagnosis, patient responds to this modality, evidence based practice  · Outcome monitoring methods: self-report,  observation, feedback from family  · Therapeutic intervention type: behavior modifying psychotherapy, supportive psychotherapy    Plan:  individual psychotherapy and medication management by physician    Return to Clinic: 2 weeks     Patient wishes to address ongoing distress about hair loss/appearance and discuss behavioral activation    Note- son Bentley has  in the last year (cardiac)    Length of Service (minutes): 30    Tano Quintero, PhD  Psychology  [facility]

## 2020-11-18 NOTE — SUBJECTIVE & OBJECTIVE
"Interval History: Ms. St reports feeling "off" this morning. Upon further questioning she says she feels well physically but is a bit depressed being in the hospital. She has not had any further abdominal pain.     Oncology Treatment Plan:   [No treatment plan]    Medications:  Continuous Infusions:  Scheduled Meds:   amitriptyline  50 mg Oral QHS    apixaban  5 mg Oral BID    levETIRAcetam  500 mg Oral BID    lipase-protease-amylase 12,000-38,000-60,000 units  1 capsule Oral TID WM    piperacillin-tazobactam (ZOSYN) IVPB  4.5 g Intravenous Q12H    polyethylene glycol  17 g Oral Daily     PRN Meds:albuterol-ipratropium, dextrose 50%, dextrose 50%, glucagon (human recombinant), glucose, glucose, LORazepam, melatonin, ondansetron, sodium chloride 0.9%     Review of Systems   Constitutional: Positive for chills, fatigue and fever. Negative for unexpected weight change.   HENT: Negative for ear pain, facial swelling, sneezing and sore throat.    Eyes: Negative for visual disturbance.   Respiratory: Negative for cough, shortness of breath and wheezing.    Cardiovascular: Positive for leg swelling. Negative for chest pain and palpitations.   Gastrointestinal: Negative for abdominal pain, constipation, diarrhea, nausea and vomiting.   Genitourinary: Negative for difficulty urinating, dysuria, enuresis and flank pain.   Musculoskeletal: Negative for arthralgias and myalgias.   Skin: Negative for pallor and rash.   Neurological: Positive for light-headedness. Negative for weakness and headaches.   Hematological: Negative for adenopathy.   Psychiatric/Behavioral: Negative for confusion.     Objective:     Vital Signs (Most Recent):  Temp: 97.8 °F (36.6 °C) (11/18/20 0353)  Pulse: 83 (11/18/20 0353)  Resp: 16 (11/18/20 0353)  BP: (!) 145/63 (11/18/20 0353)  SpO2: 99 % (11/18/20 0353) Vital Signs (24h Range):  Temp:  [97.8 °F (36.6 °C)-98.8 °F (37.1 °C)] 97.8 °F (36.6 °C)  Pulse:  [72-92] 83  Resp:  [14-18] 16  SpO2:  " [94 %-99 %] 99 %  BP: (140-167)/(63-96) 145/63     Weight: 64 kg (141 lb 1.5 oz)  Body mass index is 22.77 kg/m².  Body surface area is 1.73 meters squared.      Intake/Output Summary (Last 24 hours) at 11/18/2020 0737  Last data filed at 11/17/2020 2222  Gross per 24 hour   Intake 200 ml   Output 602 ml   Net -402 ml       Physical Exam  Vitals signs and nursing note reviewed.   Constitutional:       General: She is not in acute distress.     Appearance: She is well-developed.      Comments: Frail appearing   HENT:      Head: Normocephalic and atraumatic.      Nose: Nose normal.      Mouth/Throat:      Mouth: Mucous membranes are dry.      Pharynx: No oropharyngeal exudate or posterior oropharyngeal erythema.   Eyes:      General: No scleral icterus.     Extraocular Movements: Extraocular movements intact.      Conjunctiva/sclera: Conjunctivae normal.      Pupils: Pupils are equal, round, and reactive to light.   Neck:      Musculoskeletal: Normal range of motion and neck supple.   Cardiovascular:      Rate and Rhythm: Normal rate and regular rhythm.      Pulses: Normal pulses.      Heart sounds: Normal heart sounds. No murmur. No friction rub.   Pulmonary:      Effort: Pulmonary effort is normal. No respiratory distress.      Breath sounds: Normal breath sounds. No wheezing or rales.   Abdominal:      General: Bowel sounds are normal. There is no distension.      Palpations: Abdomen is soft.      Tenderness: There is no abdominal tenderness. There is no guarding or rebound.   Musculoskeletal: Normal range of motion.      Right lower leg: Edema present.      Left lower leg: Edema present.   Lymphadenopathy:      Cervical: No cervical adenopathy.   Skin:     General: Skin is warm and dry.      Coloration: Skin is not pale.      Findings: No erythema.   Neurological:      General: No focal deficit present.      Mental Status: She is alert and oriented to person, place, and time.      Cranial Nerves: No cranial nerve  deficit.   Psychiatric:         Mood and Affect: Mood normal.         Behavior: Behavior normal.         Significant Labs:   CBC:   Recent Labs   Lab 11/17/20  0707 11/17/20  1114 11/18/20  0351   WBC 18.69* 16.86* 10.96   HGB 7.8* 8.2* 7.7*   HCT 25.4* 26.4* 26.1*   PLT SEE COMMENT 238 204    and CMP:   Recent Labs   Lab 11/16/20  2204 11/17/20  0346 11/18/20  0351    137 141   K 4.5 4.8 4.6   * 114* 119*   CO2 13* 12* 10*    100 74   BUN 39* 38* 40*   CREATININE 3.4* 3.4* 3.6*   CALCIUM 8.8 8.3* 7.9*   PROT 7.0 5.9* 5.7*   ALBUMIN 3.1* 2.6* 2.2*   BILITOT 2.1* 2.3* 2.2*   ALKPHOS 527* 421* 352*   * 286* 145*   * 171* 130*   ANIONGAP 12 11 12   EGFRNONAA 12.5* 12.5* 11.7*       Diagnostic Results:  I have reviewed and interpreted all pertinent imaging results/findings within the past 24 hours.

## 2020-11-18 NOTE — HPI
Naty St, a 75 y.o. female, for therapy visit.  Met with patient.    Chief Complaint/Reason for Encounter: depression, adaptation to disease and treatment

## 2020-11-18 NOTE — PROGRESS NOTES
Admit Assessment    Patient Identification  Naty St   :  1945  Admit Date:  2020  Attending Provider:  Maurisio Mooney MD              Referral:   Pt was admitted to  with a diagnosis of Sepsis, and was admitted this hospital stay due to Hypomagnesemia [E83.42]  Hypophosphatasia [E83.39]  Transaminitis [R74.01]  Fever [R50.9]  Malignant neoplasm of pancreas, unspecified location of malignancy [C25.9].       is involved was referred to the Social Work Department via routine referral.  Patient presents as a 75 y.o. year old  female.    Persons interviewed : Patient and her son    Living Situation:      Resides at [address] [place] [address], phone: [phone] (home).  Patient resides with her  and her son.        Current or Past Agencies and Description of Services/Supplies    DME  Agency Name: [facility]   Agency Phone Number: [phone]  Equipment Currently Used at Home: bedside commode, walker, rolling, shower chair, cane, straight    Home Health  Agency Name: [facility]. She is no currently active on services. Would like nursing, pt and an aide upon discharge    IV Infusion: N/A      Nutrition: Oral     Outpatient Pharmacy:     [facility] [address] [address]  Phone: [phone] Fax: [phone]    [facility]  [address] [address]  Phone: [phone] Fax: [phone]    [facility]  [address] [address]  Phone: [phone] Fax: [phone]    [facility] - [address] [address]  Phone: [phone] Fax: [phone]      Patient Preference of agencies include: They did not express a preference    Patient/Caregiver informed of right to choose providers or agencies.  Patient provides  permission to release any necessary information to [facility] and to Non-[facility] agencies as needed to facilitate patient care, treatment planning, and patient discharge planning.  Written and verbal resources provided.      Coping: Overall feels she is coping well. Reports she takes it one day at a time. She has supportive family and friends. Son still able to stay with them but is concerned about what happens when he has to return to work. We discuss options of in home care.           Adjustment to Diagnosis and Treatment: Appropriate    Emotional/Behavioral/Cognitive Issues: None observed or noted. 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 she is forgetful at times            History/Current Symptoms of Anxiety/Depression: No:   History/Current Substance Use:   Social History     Tobacco Use    Smoking status: Former Smoker     Packs/day: 0.00     Years: 0.00     Pack years: 0.00     Quit date: 1961     Years since quittin.1    Smokeless tobacco: Never Used   Substance and Sexual Activity    Alcohol use: No    Drug use: No    Sexual activity: Not Currently     Partners: Male     Birth control/protection: See Surgical Hx       Indications of Abuse/Neglect: No:   Abuse Screen (yes response referral indicated)  Feels Unsafe at Home or Work/School: no    Financial:  Payor/Plan Subscr  Sex Relation Sub. Ins. ID Effective Group Num   1. [employer] MANAGE* FREDDYJANE* 1945 Female  [id number]                                   [address] [address]                            Other identified concerns/needs: None at this time    Plan: Return home with home health    Interventions/Referrals: Refer to home health  Patient/caregiver engaged in treatment planning process.     providing psychosocial and supportive counseling, resources, education, assistance and discharge planning as appropriate.  Patient/caregiver state understanding of  available resources,   following, remains available.

## 2020-11-18 NOTE — HOSPITAL COURSE
Ms. Maciel Alfonso was admitted for fever, vomiting, abdominal pain and dizziness. She was given zosyn and IVF with improvement. Source suspicious for either intra-abdominal or from UTI. CXR was not concerning for infectious process. Blood cultures x2 showing klebsiella. CT A/P did not find intra abdominal infection. Anemia to hgb of 6.8 requiring 1u prbc on 11/19. Klebsiella returned pan sensitive and she was discharged with 2 week supply of renally adjusted ciprofloxacin. She remained afebrile for > 24 hours and will be discharged home to follow up with her oncolgist Dr. Vazquez. She will continue to hold her anti neoplastic therapy until then.

## 2020-11-18 NOTE — CARE UPDATE
Spoke with son at bedside and gave all updates and answered all questions regarding her current treatment and hospital stay to his satisfaction.     Bia Horner

## 2020-11-18 NOTE — PROGRESS NOTES
Pt received from 5th floor. Set up in bed; bed alarm on. Zosyn started. Call light and personal items within reach; pt instructed to call before getting OOB.

## 2020-11-18 NOTE — PLAN OF CARE
Uneventful shift. Pt has had no c/o pain this shift. No c/o n/v or dizziness. Iv abx regimen maintained. Pt remains on cardiac monitoring. Pt has remained free from injury this shift. Bed in low locked position. Call light and personal belongings within reach. Side rails up x2. Nonskid socks in place. Pt instructed to call with any needs. Will continue to monitor.

## 2020-11-18 NOTE — PROGRESS NOTES
"[facility]  Hematology/Oncology  Progress Note    Patient Name: Naty St  Admission Date: 11/16/2020  Hospital Length of Stay: 2 days  Code Status: DNR     Subjective:     HPI:  Mrs. St is a 75 year old lady with a past medical history significant for HTN, DVT, metastatic R lung adenocarcinoma, breast cancer s/p L lumpectomy, meningioma s/p resection, and remote pancreatic cancer s/p whipple who presented to [facility] ED the evening of 11/16/2020 for evaluation of weakness, chills, and dizziness. The patient initially presented with vertigo symptoms stating she was unable to walk due to feeling the "room spinning." She had one prior episode 1 year ago but it was never further evaluated. The patient denies any recent cough, cold, nausea, or diarrhea. She does endorse 2 episodes of vomiting immediately after eating. One was day of presentation and the other was a few days before. She was able to eat normally in between with no symptoms. She denies any sick contacts, she lives with her  and her son visits occasionally. She has associated abdominal pain that is described as radiating to the back and sides of her upper abdomen. Pain is not present when she is not having episodes of vomiting. She endorses decreased PO intake over the past few days but has been able to tolerate water and some food. The patient is accompanied by her  that provides supplemental history. In the ED the patient was persistently febrile and tachycardic. Chest X-ray did not show any acute consolidation and her CT head showed stable post-surgical changes and no acute process. She was admitted to Medical Oncology for further management.    Oncologic History (Per primary oncologist notes):  Initially seen in 1/2016She has a history of pancreatic cancer 17 years ago, breast cancer and meningioma, presented to the hospital complaining of loss of consciousness. Had a meningiomaresection done in the past; " "however underwent neurosurgical resection with Dr. 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    Additional immunohistochemical stains were performed which revealed the tumor cells to be are positive for TTF1 and negative for ER and GCDFP. The morphology and TTF1 positivity are most consistent with lung primary.    Of note, imaging scan at the end of January 2016 revealed a mass in the lung at 2 cm in the medial aspect of the apical segment of the right upper lobe abutting the mediastinum at the level of the azygous vein and abutting and possibly encasing the segmental bronchi and vessels of the apical segment of the right upper lobe and no pleural fluid was present. Also, there is an enlarged right paratracheal lymph node. No evidence of any metastatic disease at the pancreatic site with postoperative changes post Whipple disease   PET Scan from 2/15/16 showed "Hypermetabolic mass in the right lung apex consistent with a primary malignancy. Hypermetabolic mediastinal lymph nodes consistent with metastatic disease. Right sacral hypermetabolic lesion consistent with metastatic disease, noting additional mildly sclerotic lesions in multiple vertebral bodies which do not demonstrate abnormal hypermetabolism  She underwent IR bone biopsy which revealed metastatic adenocarcinoma of lung origin. 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  She has completed focal RT to brain lesions in March 2016.    PET scan from 6/6/16 shows "Dramatic almost complete response to therapy."  Lovenox started after US lower ext 6/7/16 shows Acute complete occlusion of one of the left posterior tibial vein.Remote partial thrombus of the proximal left superficial femoral vein.  She is on Tarceva.   12/6/16 PET scan reveals "Stable right upper lobe lesion and right hilar lymph node. No new lesions identified. Trace left pleural " "effusion".  1/27/17 Renal u/s "Medical renal disease. Nonobstructive right nephrolithiasis"  1/31/17 PET - "Right upper lobe nodule and right hilar lymph node similar and very low grade activity.  There is no definite evidence of recurrence."   11/9/17 PET "In this patient with history of lung cancer, there is interval increase in size and hypermetabolism of a right upper lobe lung lesion concerning for recurrent disease. Additionally, there is interval appearance of a hypermetabolic paratracheal lymph node suspicious for metastatic disease"     She progressed on Tarceva She underwent EBUS on 12/5/17. Pathology revealed adenocarcinoma but T790m could not be done as quantity was not insufficient. Her PET scan from 1/30/18 revealed The patient's previously identified abnormal lesions is slightly greater uptake of FDG on today's study compared with prior exam. These findings are concerning for progression of disease"     She discontinued Tagrisso in April 2018. Restaging scans from 6/4/18 revealed "Stable appearance of a spiculated right upper lobe pulmonary lesion.  Additional irregular focus superior to the lesion in the right lung apex is stable and may represent scar or additional lesion; although this was not hypermetabolic on prior PET-CT.  No new pulmonary lesions. 1.3 cm subcarinal lymph node, not hypermetabolic on prior PET-CT. Stable postsurgical changes of a Whipple procedure. Bilateral nonobstructing nephrolithiasis.  Stable sclerotic focus in the T5 vertebral body, possibly a bone island as this was not hypermetabolic on prior PET-CT. Small hiatal hernia. RECIST SUMMARY: Date of prior examination for comparison 01/30/2018 Lesion 1: Right upper lobe 1.3 cm Series 2 image 31 prior measurement 1.3 cm"  Bone scan also from 6/4/18 revealed no evidence of mets.     Her PET scan from 7/11/18 revealed "Right suprahilar lesion SUV max 3.76, previously 6.86. Pretracheal lymph node SUV max 2.55, previously 3.79. " "There is physiologic intracranial, head, and neck activity.  There is muscle activation.  There is vocal cord activation.  There is physiologic liver, spleen, GI and  activity.  There is a hiatal hernia.  Pelvic organs show nothing unusual.  No bone lesions are seen.  There are areas of benign appearing lung scar"  She notes fatigue.  She denies any nausea, vomiting, diarrhea, constipation, abdominal pain, weight loss or loss of appetite, chest pain, shortness of breath, leg swelling, fatigue, pain, headache, dizziness, or mood changes. Her ECOG PS is 2. She is accompanied by her  and son     She has been on Tarceva since 6/5/18. She completed SBRT to her right lung lesions.      Restaging PET scan from 10/15/2020 reveals "In this patient with history of lung cancer, the previously described hypermetabolic right paratracheal and right hilar lesions appears stable in regards to metabolic activity compared to prior exam.  However, there has been interval increase in size and metabolic activity of additional pulmonary nodules scattered within both lungs, as further detailed above.  Findings concerning for disease progression/metastases.zAdditional findings as above"  She was hospitalized from 10/15-10/17/2020 with renal failure, Tarceva was held.      Interval History: Ms. St reports feeling "off" this morning. Upon further questioning she says she feels well physically but is a bit depressed being in the hospital. She has not had any further abdominal pain.     Oncology Treatment Plan:   [No treatment plan]    Medications:  Continuous Infusions:  Scheduled Meds:   amitriptyline  50 mg Oral QHS    apixaban  5 mg Oral BID    levETIRAcetam  500 mg Oral BID    lipase-protease-amylase 12,000-38,000-60,000 units  1 capsule Oral TID WM    piperacillin-tazobactam (ZOSYN) IVPB  4.5 g Intravenous Q12H    polyethylene glycol  17 g Oral Daily     PRN Meds:albuterol-ipratropium, dextrose 50%, dextrose 50%, " glucagon (human recombinant), glucose, glucose, LORazepam, melatonin, ondansetron, sodium chloride 0.9%     Review of Systems   Constitutional: Positive for chills, fatigue and fever. Negative for unexpected weight change.   HENT: Negative for ear pain, facial swelling, sneezing and sore throat.    Eyes: Negative for visual disturbance.   Respiratory: Negative for cough, shortness of breath and wheezing.    Cardiovascular: Positive for leg swelling. Negative for chest pain and palpitations.   Gastrointestinal: Negative for abdominal pain, constipation, diarrhea, nausea and vomiting.   Genitourinary: Negative for difficulty urinating, dysuria, enuresis and flank pain.   Musculoskeletal: Negative for arthralgias and myalgias.   Skin: Negative for pallor and rash.   Neurological: Positive for light-headedness. Negative for weakness and headaches.   Hematological: Negative for adenopathy.   Psychiatric/Behavioral: Negative for confusion.     Objective:     Vital Signs (Most Recent):  Temp: 97.8 °F (36.6 °C) (11/18/20 0353)  Pulse: 83 (11/18/20 0353)  Resp: 16 (11/18/20 0353)  BP: (!) 145/63 (11/18/20 0353)  SpO2: 99 % (11/18/20 0353) Vital Signs (24h Range):  Temp:  [97.8 °F (36.6 °C)-98.8 °F (37.1 °C)] 97.8 °F (36.6 °C)  Pulse:  [72-92] 83  Resp:  [14-18] 16  SpO2:  [94 %-99 %] 99 %  BP: (140-167)/(63-96) 145/63     Weight: 64 kg (141 lb 1.5 oz)  Body mass index is 22.77 kg/m².  Body surface area is 1.73 meters squared.      Intake/Output Summary (Last 24 hours) at 11/18/2020 0737  Last data filed at 11/17/2020 2222  Gross per 24 hour   Intake 200 ml   Output 602 ml   Net -402 ml       Physical Exam  Vitals signs and nursing note reviewed.   Constitutional:       General: She is not in acute distress.     Appearance: She is well-developed.      Comments: Frail appearing   HENT:      Head: Normocephalic and atraumatic.      Nose: Nose normal.      Mouth/Throat:      Mouth: Mucous membranes are dry.      Pharynx: No  oropharyngeal exudate or posterior oropharyngeal erythema.   Eyes:      General: No scleral icterus.     Extraocular Movements: Extraocular movements intact.      Conjunctiva/sclera: Conjunctivae normal.      Pupils: Pupils are equal, round, and reactive to light.   Neck:      Musculoskeletal: Normal range of motion and neck supple.   Cardiovascular:      Rate and Rhythm: Normal rate and regular rhythm.      Pulses: Normal pulses.      Heart sounds: Normal heart sounds. No murmur. No friction rub.   Pulmonary:      Effort: Pulmonary effort is normal. No respiratory distress.      Breath sounds: Normal breath sounds. No wheezing or rales.   Abdominal:      General: Bowel sounds are normal. There is no distension.      Palpations: Abdomen is soft.      Tenderness: There is no abdominal tenderness. There is no guarding or rebound.   Musculoskeletal: Normal range of motion.      Right lower leg: Edema present.      Left lower leg: Edema present.   Lymphadenopathy:      Cervical: No cervical adenopathy.   Skin:     General: Skin is warm and dry.      Coloration: Skin is not pale.      Findings: No erythema.   Neurological:      General: No focal deficit present.      Mental Status: She is alert and oriented to person, place, and time.      Cranial Nerves: No cranial nerve deficit.   Psychiatric:         Mood and Affect: Mood normal.         Behavior: Behavior normal.         Significant Labs:   CBC:   Recent Labs   Lab 11/17/20  0707 11/17/20  1114 11/18/20  0351   WBC 18.69* 16.86* 10.96   HGB 7.8* 8.2* 7.7*   HCT 25.4* 26.4* 26.1*   PLT SEE COMMENT 238 204    and CMP:   Recent Labs   Lab 11/16/20  2204 11/17/20  0346 11/18/20  0351    137 141   K 4.5 4.8 4.6   * 114* 119*   CO2 13* 12* 10*    100 74   BUN 39* 38* 40*   CREATININE 3.4* 3.4* 3.6*   CALCIUM 8.8 8.3* 7.9*   PROT 7.0 5.9* 5.7*   ALBUMIN 3.1* 2.6* 2.2*   BILITOT 2.1* 2.3* 2.2*   ALKPHOS 527* 421* 352*   * 286* 145*   * 171*  "130*   ANIONGAP 12 11 12   EGFRNONAA 12.5* 12.5* 11.7*       Diagnostic Results:  I have reviewed and interpreted all pertinent imaging results/findings within the past 24 hours.    Assessment/Plan:     * Sepsis  Patient presenting with fever and tachycardia. Received 250cc fluid bolus and vancomycin and cefepime in the ED. Patient's only complaints are vertigo-like symptoms which could be secondary to dehydration / sepsis as well. CT head negative for any acute abnormality. CXR with no focal consolidation. She denies dysuria however UA with 2+ leukocytes and few bacteria on microscopy. Also patient complaining of 2 episodes of vomiting with an associated pain radiating to the back. Patient is s/p Whipple procedure, symptoms concerning for potential pancreatitis of remnant pancreas. Patient also with increase in LFT's and bili, concerning for potential hepatic abscess vs. Metastatic disease or external obstructive process. However, nothing suspicious seen on imaging and has had prior LFT elevations that resolved on their own.     Plan:  Antibiotics: Transitioned cefepime to zosyn for better anaerobic coverage, d/c'ed vancomycin as low suspicion for MRSA  Fluids: 250cc in ED, will give additional 1L LR bolus for 30cc/kg crystalloid resuscitation  Source: Intra-abdominal vs. UTI vs. Pancreatitis    Lipase <3. RUQ ultrasound with no focal hepatic parenchymal abnormality and no intra or extrahepatic bile duct dilatation Blood cultures showing gram negative rods x2     Plan:  - FU blood culture speciation and sensitivities   - Urine culture pending; FU   - FU CT abdo/pelvis without contrast to look for further possible sources given increase LFTs      Transaminitis  Please see "sepsis" above    UTI (urinary tract infection)  Please see "sepsis" above    Seizure disorder  Continuing home keppra 500mg bid    Chronic kidney disease (CKD) stage G4/A3, severely decreased glomerular filtration rate (GFR) between 15-29 " mL/min/1.73 square meter and albuminuria creatinine ratio greater than 300 mg/g  Creatinine 3.4 on admit which appears to be her baseline    - Avoid nephrotoxic agents (patient with reported IV contrast allergy as well)  - renally dose all medications    Adjustment disorder with depressed mood  Patient reports taking amitryptiline nightly and ativan nightly PRN    - Would prefer to avoid these medications in an elderly patient however they are chronic so will continue while inpatient    OAB (overactive bladder)  Patient on Myrbetriq at home, not on forumulary    - Could potentially switch to oxybutinin while inpatient, restart as clinically appropriate    Malignant neoplasm of lower lobe of right lung  Patient with history of multiple cancers, most recently for right lung adenocarcinoma with metastatic disease. Most recent PET concerning for progression of disease with increased metabolic activity in multiple nodules in bilateral lungs. Patient's lab abnormalities have kept her Tarceva on home, plan was to transition to Afatinib if patient could not tolerate Tarceva.    Receives outpatient fluid and electrolyte infusions at oncology infusion center    Meningioma  S/p resection with NSGY. CT head on presentation with stable post-surgical findings.    - Continuing home keppra 500mg bid    Essential hypertension  On amlodipine 5mg qd at home    - Normotensive at this time, will hold in setting of sepsis  - Resume as clinically appropriate           Bia Horner MD  Hematology/Oncology  [facility]

## 2020-11-18 NOTE — ASSESSMENT & PLAN NOTE
Patient with increasing dysphoria since COVID-19 related restrictions.     Patient plans to return to outpatient treatment with me. (I will also see her again if her inpatient stay is extended)    Patient and son know how to reach me.

## 2020-11-18 NOTE — ASSESSMENT & PLAN NOTE
Patient presenting with fever and tachycardia. Received 250cc fluid bolus and vancomycin and cefepime in the ED. Patient's only complaints are vertigo-like symptoms which could be secondary to dehydration / sepsis as well. CT head negative for any acute abnormality. CXR with no focal consolidation. She denies dysuria however UA with 2+ leukocytes and few bacteria on microscopy. Also patient complaining of 2 episodes of vomiting with an associated pain radiating to the back. Patient is s/p Whipple procedure, symptoms concerning for potential pancreatitis of remnant pancreas. Patient also with increase in LFT's and bili, concerning for potential hepatic abscess vs. Metastatic disease or external obstructive process. However, nothing suspicious seen on imaging and has had prior LFT elevations that resolved on their own.     Plan:  Antibiotics: Transitioned cefepime to zosyn for better anaerobic coverage, d/c'ed vancomycin as low suspicion for MRSA  Fluids: 250cc in ED, will give additional 1L LR bolus for 30cc/kg crystalloid resuscitation  Source: Intra-abdominal vs. UTI vs. Pancreatitis    Lipase <3. RUQ ultrasound with no focal hepatic parenchymal abnormality and no intra or extrahepatic bile duct dilatation Blood cultures showing gram negative rods x2     Plan:  - FU blood culture speciation and sensitivities   - Urine culture pending; FU   - FU CT abdo/pelvis without contrast to look for further possible sources given increase LFTs

## 2020-11-18 NOTE — SUBJECTIVE & OBJECTIVE
Therapeutic Intervention: Met with patient (known to me).  Inpatient/Partial Hospital - Supportive psychotherapy 30 min - CPT Code 87251    Chief Complaint/Reason for Encounter: depression, adaptation to illness     Interval History and Content of Current Session: Patient discussed overall functioning with COVID-19 pandemic and recent decline in mood. Mood changes likely related to lack of social interaction and activities due to COVID (no exercise, no Bahai, limited visits with family). She did feel much worse during the past week, but this is likely due to admit illness. Discussed future goals and return to outpatient therapy.    Risk Parameters:  Patient reports no suicidal ideation  Patient reports no homicidal ideation  Patient reports no self-injurious behavior  Patient reports no violent behavior    Verbal Deficits: None    Patient's response to intervention:  The patient's response to intervention is accepting, motivated.    Progress toward goals and other mental status changes:  The patient's progress toward goals is n/a.

## 2020-11-18 NOTE — PLAN OF CARE
CT of abdomen completed. Zosyn administered as ordered. BC pending. NSR on monitor with HR 70-80s. Patient afebrile throughout shift. Son at bedside. POC reviewed with pt and son and both verbalized understanding. Fall precautions maintained. VSS, AOX4, pt denies pain or SOB at this time. Pt up in chair with wheels locked, call light within reach. Pt instructed to get up with staff assistance. Will continue to monitor.

## 2020-11-19 NOTE — SUBJECTIVE & OBJECTIVE
Interval History: Discussed blood transfusion with patientnt and she requested to wait until her son was here to discuss that further. Otherwise no acute events overnight and no physical complaints or symptoms.     Oncology Treatment Plan:   [No treatment plan]    Medications:  Continuous Infusions:  Scheduled Meds:   amitriptyline  50 mg Oral QHS    apixaban  5 mg Oral BID    levETIRAcetam  500 mg Oral BID    lipase-protease-amylase 12,000-38,000-60,000 units  1 capsule Oral TID WM    piperacillin-tazobactam (ZOSYN) IVPB  4.5 g Intravenous Q12H    polyethylene glycol  17 g Oral Daily     PRN Meds:albuterol-ipratropium, dextrose 50%, dextrose 50%, glucagon (human recombinant), glucose, glucose, LORazepam, melatonin, ondansetron, sodium chloride 0.9%     Review of Systems   Constitutional: Positive for chills, fatigue and fever. Negative for unexpected weight change.   HENT: Negative for ear pain, facial swelling, sneezing and sore throat.    Eyes: Negative for visual disturbance.   Respiratory: Negative for cough, shortness of breath and wheezing.    Cardiovascular: Positive for leg swelling. Negative for chest pain and palpitations.   Gastrointestinal: Negative for abdominal pain, constipation, diarrhea, nausea and vomiting.   Genitourinary: Negative for difficulty urinating, dysuria, enuresis and flank pain.   Musculoskeletal: Negative for arthralgias and myalgias.   Skin: Negative for pallor and rash.   Neurological: Positive for light-headedness. Negative for weakness and headaches.   Hematological: Negative for adenopathy.   Psychiatric/Behavioral: Negative for confusion.     Objective:     Vital Signs (Most Recent):  Temp: 97.2 °F (36.2 °C) (11/19/20 0750)  Pulse: 78 (11/19/20 0750)  Resp: 16 (11/19/20 0750)  BP: (!) 168/74 (11/19/20 0750)  SpO2: (!) 94 % (11/19/20 0750) Vital Signs (24h Range):  Temp:  [96.4 °F (35.8 °C)-99.1 °F (37.3 °C)] 97.2 °F (36.2 °C)  Pulse:  [78-86] 78  Resp:  [16-20] 16  SpO2:   [94 %-99 %] 94 %  BP: (120-168)/(60-75) 168/74     Weight: 64.4 kg (142 lb)  Body mass index is 22.92 kg/m².  Body surface area is 1.73 meters squared.      Intake/Output Summary (Last 24 hours) at 11/19/2020 0822  Last data filed at 11/19/2020 0612  Gross per 24 hour   Intake 1280 ml   Output 1302 ml   Net -22 ml       Physical Exam  Vitals signs and nursing note reviewed.   Constitutional:       General: She is not in acute distress.     Appearance: She is well-developed.      Comments: Frail appearing   HENT:      Head: Normocephalic and atraumatic.      Nose: Nose normal.      Mouth/Throat:      Mouth: Mucous membranes are dry.      Pharynx: No oropharyngeal exudate or posterior oropharyngeal erythema.   Eyes:      General: No scleral icterus.     Extraocular Movements: Extraocular movements intact.      Conjunctiva/sclera: Conjunctivae normal.      Pupils: Pupils are equal, round, and reactive to light.   Neck:      Musculoskeletal: Normal range of motion and neck supple.   Cardiovascular:      Rate and Rhythm: Normal rate and regular rhythm.      Pulses: Normal pulses.      Heart sounds: Normal heart sounds. No murmur. No friction rub.   Pulmonary:      Effort: Pulmonary effort is normal. No respiratory distress.      Breath sounds: Normal breath sounds. No wheezing or rales.   Abdominal:      General: Bowel sounds are normal. There is no distension.      Palpations: Abdomen is soft.      Tenderness: There is no abdominal tenderness. There is no guarding or rebound.   Musculoskeletal: Normal range of motion.      Right lower leg: Edema present.      Left lower leg: Edema present.   Lymphadenopathy:      Cervical: No cervical adenopathy.   Skin:     General: Skin is warm and dry.      Coloration: Skin is not pale.      Findings: No erythema.   Neurological:      General: No focal deficit present.      Mental Status: She is alert and oriented to person, place, and time.      Cranial Nerves: No cranial nerve  deficit.   Psychiatric:         Mood and Affect: Mood normal.         Behavior: Behavior normal.         Significant Labs:   CBC:   Recent Labs   Lab 11/17/20  1114 11/18/20  0351 11/19/20  0510   WBC 16.86* 10.96 7.77   HGB 8.2* 7.7* 6.8*   HCT 26.4* 26.1* 22.7*    204 237    and CMP:   Recent Labs   Lab 11/18/20  0351 11/19/20  0510    142   K 4.6 3.9   * 119*   CO2 10* 13*   GLU 74 90   BUN 40* 38*   CREATININE 3.6* 3.6*   CALCIUM 7.9* 7.3*   PROT 5.7* 5.1*   ALBUMIN 2.2* 2.1*   BILITOT 2.2* 0.9   ALKPHOS 352* 255*   * 60*   * 90*   ANIONGAP 12 10   EGFRNONAA 11.7* 11.7*       Diagnostic Results:  I have reviewed and interpreted all pertinent imaging results/findings within the past 24 hours.

## 2020-11-19 NOTE — PLAN OF CARE
Plan of care discussed with patient at start of shift. Free from falls and injuries. Resting quietly with eyes closed. Respirations even, unlabored. Skin warm and dry. Denies pain. Denies nausea. Telemetry in use with NSR noted. Vital signs stable. Frequent checks for pain and safety maintained. Bed in lowest position, wheels locked, side rails up x's 2, call light in reach. Instructed to call for assistance as needed, verbalizes understanding. Will continue to monitor.

## 2020-11-19 NOTE — PT/OT/SLP EVAL
Physical Therapy Evaluation    Patient Name:  Naty St   MRN:  [id number]    Recommendations:     Discharge Recommendations:  home health PT   Discharge Equipment Recommendations: none   Barriers to discharge: None    Assessment:     Naty St is a 75 y.o. female admitted with a medical diagnosis of Sepsis.  She presents with the following impairments/functional limitations:  impaired endurance, impaired functional mobilty Pt. cooperative and tolerated treatment well.    Rehab Prognosis: Good; patient would benefit from acute skilled PT services to address these deficits and reach maximum level of function.    Recent Surgery: * No surgery found *      Plan:     During this hospitalization, patient to be seen 3 x/week to address the identified rehab impairments via gait training, therapeutic activities, therapeutic exercises and progress toward the following goals:    · Plan of Care Expires:  12/19/20    Subjective     Chief Complaint: weakness  Patient/Family Comments/goals: to get stronger  Pain/Comfort:  · Pain Rating 1: 0/10  · Pain Rating Post-Intervention 1: 0/10    Patients cultural, spiritual, Sabianist conflicts given the current situation: no    Living Environment:  Pt. Lives with spouse in [facility] with 8 DENNY and (B) handrails  Prior to admission, patients level of function was indep.  Equipment used at home: bedside commode, walker, rolling, shower chair, cane, straight.  Upon discharge, patient will have assistance from spouse.    Objective:     Communicated with nursing prior to session.  Patient found up in chair with peripheral IV  upon PT entry to room.    General Precautions: Standard, fall   Orthopedic Precautions:N/A   Braces:       Exams:  · RLE ROM: WFL  · RLE Strength: WFL  · LLE ROM: WFL  · LLE Strength: WFL    Functional Mobility:  · Transfers:     · Sit to Stand:  stand by assistance with no AD  · Bed to Chair: stand by assistance with  no AD  using  Stand Pivot  · Gait:  250' with RW and SBA. Pt. veering to (L), but able to self-correct.  · Balance: fair    Therapeutic Activities and Exercises:   Discussed therapy needs, goals, and POC.    AM-PAC 6 CLICK MOBILITY  Total Score:22     Patient left up in chair with all lines intact and call button in reach.    GOALS:   Multidisciplinary Problems     Physical Therapy Goals        Problem: Physical Therapy Goal    Goal Priority Disciplines Outcome Goal Variances Interventions   Physical Therapy Goal     PT, PT/OT Ongoing, Progressing     Description: Goals to be met by: 12/3/2020     Patient will increase functional independence with mobility by performin. Supine to sit with Set-up [place]  2. Sit to supine with Set-up [place]  3. Sit to stand transfer with Supervision  4. Bed to chair transfer with Supervision using LRAD  5. Gait  x 250 feet with Supervision using LRAD.   6. Ascend/descend 8 stair with Handrail Stand-by Assistance.   7. Lower extremity exercise program x15 reps per handout, with supervision                     History:     Past Medical History:   Diagnosis Date    Anticoagulant long-term use     Blood clot in vein 2016    Breast cancer     Cataract     Hypertension     Lung cancer     Lung cancer metastatic to brain     Pancreatic cancer     Posterior capsular opacification, left eye     Psychiatric problem     Seizures 2016    Stroke     Therapy        Past Surgical History:   Procedure Laterality Date    BONE BIOSPY  3/9/16    BRAIN SURGERY  16    tumor removal 16    BREAST LUMPECTOMY  1998    left    CATARACT EXTRACTION Bilateral 2004    yag OU    CHOLECYSTECTOMY      COLONOSCOPY N/A 2015    Procedure: COLONOSCOPY;  Surgeon: Alex Carmona MD;  Location: [address]);  Service: Endoscopy;  Laterality: N/A;  2 year f/u    COLONOSCOPY N/A 2018    Procedure: COLONOSCOPY;  Surgeon: Jim Raman MD;  Location: [address]);   Service: Endoscopy;  Laterality: N/A;  Eliquis - per Dr. George santiago to hold Eliquis x 2 days prior to colon- ERW    COLONOSCOPY N/A 6/2/2020    Procedure: COLONOSCOPY;  Surgeon: Darius Medina MD;  Location: [facility] ([address] [place] [address]);  Service: Endoscopy;  Laterality: N/A;  Schedule tomorrow  patient rescheduled due to poor bowel prep-BB  rapid covid test completed on 6/1/20 per ROQUE Zabala, email sent to Endo Staff-BB  updated instructions emailed to patient-BB  Latex allergy  approval to hold Eliquis received, see telephone encounter 5/18/20-BB      cysto and right ureteral stent  4/27/12    ecoli  2010    removal of blockage, done throat, then through the liver.    ESOPHAGOGASTRODUODENOSCOPY N/A 6/2/2020    Procedure: EGD (ESOPHAGOGASTRODUODENOSCOPY);  Surgeon: Darius Medina MD;  Location: [facility] ([address] [place] [address]);  Service: Endoscopy;  Laterality: N/A;  Schedule tomorrow  patient rescheduled due to poor bowel prep-BB  rapid covid test completed on 6/1/20 per ROQUE Zabala, email sent to Endo Staff-BB  updated instructions emailed to patient-BB  Latex allergy  approval to hold Eliquis received, see telephone     HYSTERECTOMY  1974    KIDNEY STONE SURGERY  2010--laser    left eswl  6/20/12    OOPHORECTOMY  4/25/2012    Laparoscopic BSO, lysis of adhesions, cystoscopy greater than 35mins     WHIPPLE PROCEDURE W/ LAPAROSCOPY  1998       Time Tracking:     PT Received On: 11/19/20  PT Start Time: 1522     PT Stop Time: 1537  PT Total Time (min): 15 min     Billable Minutes: Evaluation 15      Ruddy Levine, PT  11/19/2020

## 2020-11-19 NOTE — NURSING
Bia Horner informed during rounds and reminded at 1521 that second signature was required for DNR paperwork in chart. Second signature in chart still not obtained at this time. Charge nurse Maurisio notified. Information passed along in shift report to nightshift ROSA Gonzalez.

## 2020-11-19 NOTE — PROGRESS NOTES
"[facility]  Hematology/Oncology  Progress Note    Patient Name: Naty St  Admission Date: 11/16/2020  Hospital Length of Stay: 3 days  Code Status: DNR     Subjective:     HPI:  Mrs. St is a 75 year old lady with a past medical history significant for HTN, DVT, metastatic R lung adenocarcinoma, breast cancer s/p L lumpectomy, meningioma s/p resection, and remote pancreatic cancer s/p whipple who presented to [facility] ED the evening of 11/16/2020 for evaluation of weakness, chills, and dizziness. The patient initially presented with vertigo symptoms stating she was unable to walk due to feeling the "room spinning." She had one prior episode 1 year ago but it was never further evaluated. The patient denies any recent cough, cold, nausea, or diarrhea. She does endorse 2 episodes of vomiting immediately after eating. One was day of presentation and the other was a few days before. She was able to eat normally in between with no symptoms. She denies any sick contacts, she lives with her  and her son visits occasionally. She has associated abdominal pain that is described as radiating to the back and sides of her upper abdomen. Pain is not present when she is not having episodes of vomiting. She endorses decreased PO intake over the past few days but has been able to tolerate water and some food. The patient is accompanied by her  that provides supplemental history. In the ED the patient was persistently febrile and tachycardic. Chest X-ray did not show any acute consolidation and her CT head showed stable post-surgical changes and no acute process. She was admitted to Medical Oncology for further management.    Oncologic History (Per primary oncologist notes):  Initially seen in 1/2016She has a history of pancreatic cancer 17 years ago, breast cancer and meningioma, presented to the hospital complaining of loss of consciousness. Had a meningiomaresection done in the past; " "however underwent neurosurgical resection with Dr. 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    Additional immunohistochemical stains were performed which revealed the tumor cells to be are positive for TTF1 and negative for ER and GCDFP. The morphology and TTF1 positivity are most consistent with lung primary.    Of note, imaging scan at the end of January 2016 revealed a mass in the lung at 2 cm in the medial aspect of the apical segment of the right upper lobe abutting the mediastinum at the level of the azygous vein and abutting and possibly encasing the segmental bronchi and vessels of the apical segment of the right upper lobe and no pleural fluid was present. Also, there is an enlarged right paratracheal lymph node. No evidence of any metastatic disease at the pancreatic site with postoperative changes post Whipple disease   PET Scan from 2/15/16 showed "Hypermetabolic mass in the right lung apex consistent with a primary malignancy. Hypermetabolic mediastinal lymph nodes consistent with metastatic disease. Right sacral hypermetabolic lesion consistent with metastatic disease, noting additional mildly sclerotic lesions in multiple vertebral bodies which do not demonstrate abnormal hypermetabolism  She underwent IR bone biopsy which revealed metastatic adenocarcinoma of lung origin. 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  She has completed focal RT to brain lesions in March 2016.    PET scan from 6/6/16 shows "Dramatic almost complete response to therapy."  Lovenox started after US lower ext 6/7/16 shows Acute complete occlusion of one of the left posterior tibial vein.Remote partial thrombus of the proximal left superficial femoral vein.  She is on Tarceva.   12/6/16 PET scan reveals "Stable right upper lobe lesion and right hilar lymph node. No new lesions identified. Trace left pleural " "effusion".  1/27/17 Renal u/s "Medical renal disease. Nonobstructive right nephrolithiasis"  1/31/17 PET - "Right upper lobe nodule and right hilar lymph node similar and very low grade activity.  There is no definite evidence of recurrence."   11/9/17 PET "In this patient with history of lung cancer, there is interval increase in size and hypermetabolism of a right upper lobe lung lesion concerning for recurrent disease. Additionally, there is interval appearance of a hypermetabolic paratracheal lymph node suspicious for metastatic disease"     She progressed on Tarceva She underwent EBUS on 12/5/17. Pathology revealed adenocarcinoma but T790m could not be done as quantity was not insufficient. Her PET scan from 1/30/18 revealed The patient's previously identified abnormal lesions is slightly greater uptake of FDG on today's study compared with prior exam. These findings are concerning for progression of disease"     She discontinued Tagrisso in April 2018. Restaging scans from 6/4/18 revealed "Stable appearance of a spiculated right upper lobe pulmonary lesion.  Additional irregular focus superior to the lesion in the right lung apex is stable and may represent scar or additional lesion; although this was not hypermetabolic on prior PET-CT.  No new pulmonary lesions. 1.3 cm subcarinal lymph node, not hypermetabolic on prior PET-CT. Stable postsurgical changes of a Whipple procedure. Bilateral nonobstructing nephrolithiasis.  Stable sclerotic focus in the T5 vertebral body, possibly a bone island as this was not hypermetabolic on prior PET-CT. Small hiatal hernia. RECIST SUMMARY: Date of prior examination for comparison 01/30/2018 Lesion 1: Right upper lobe 1.3 cm Series 2 image 31 prior measurement 1.3 cm"  Bone scan also from 6/4/18 revealed no evidence of mets.     Her PET scan from 7/11/18 revealed "Right suprahilar lesion SUV max 3.76, previously 6.86. Pretracheal lymph node SUV max 2.55, previously 3.79. " "There is physiologic intracranial, head, and neck activity.  There is muscle activation.  There is vocal cord activation.  There is physiologic liver, spleen, GI and  activity.  There is a hiatal hernia.  Pelvic organs show nothing unusual.  No bone lesions are seen.  There are areas of benign appearing lung scar"  She notes fatigue.  She denies any nausea, vomiting, diarrhea, constipation, abdominal pain, weight loss or loss of appetite, chest pain, shortness of breath, leg swelling, fatigue, pain, headache, dizziness, or mood changes. Her ECOG PS is 2. She is accompanied by her  and son     She has been on Tarceva since 6/5/18. She completed SBRT to her right lung lesions.      Restaging PET scan from 10/15/2020 reveals "In this patient with history of lung cancer, the previously described hypermetabolic right paratracheal and right hilar lesions appears stable in regards to metabolic activity compared to prior exam.  However, there has been interval increase in size and metabolic activity of additional pulmonary nodules scattered within both lungs, as further detailed above.  Findings concerning for disease progression/metastases.zAdditional findings as above"  She was hospitalized from 10/15-10/17/2020 with renal failure, Tarceva was held.    Interval History: Discussed blood transfusion with patientnt and she requested to wait until her son was here to discuss that further. Otherwise no acute events overnight and no physical complaints or symptoms.      Oncology Treatment Plan:   [No treatment plan]     Medications:  Continuous Infusions:  Scheduled Meds:   amitriptyline  50 mg Oral QHS    apixaban  5 mg Oral BID    levETIRAcetam  500 mg Oral BID    lipase-protease-amylase 12,000-38,000-60,000 units  1 capsule Oral TID WM    piperacillin-tazobactam (ZOSYN) IVPB  4.5 g Intravenous Q12H    polyethylene glycol  17 g Oral Daily      PRN Meds:albuterol-ipratropium, dextrose 50%, dextrose 50%, glucagon " (human recombinant), glucose, glucose, LORazepam, melatonin, ondansetron, sodium chloride 0.9%      Review of Systems   Constitutional: Positive for chills, fatigue and fever. Negative for unexpected weight change.   HENT: Negative for ear pain, facial swelling, sneezing and sore throat.    Eyes: Negative for visual disturbance.   Respiratory: Negative for cough, shortness of breath and wheezing.    Cardiovascular: Positive for leg swelling. Negative for chest pain and palpitations.   Gastrointestinal: Negative for abdominal pain, constipation, diarrhea, nausea and vomiting.   Genitourinary: Negative for difficulty urinating, dysuria, enuresis and flank pain.   Musculoskeletal: Negative for arthralgias and myalgias.   Skin: Negative for pallor and rash.   Neurological: Positive for light-headedness. Negative for weakness and headaches.   Hematological: Negative for adenopathy.   Psychiatric/Behavioral: Negative for confusion.      Objective:      Vital Signs (Most Recent):  Temp: 97.2 °F (36.2 °C) (11/19/20 0750)  Pulse: 78 (11/19/20 0750)  Resp: 16 (11/19/20 0750)  BP: (!) 168/74 (11/19/20 0750)  SpO2: (!) 94 % (11/19/20 0750) Vital Signs (24h Range):  Temp:  [96.4 °F (35.8 °C)-99.1 °F (37.3 °C)] 97.2 °F (36.2 °C)  Pulse:  [78-86] 78  Resp:  [16-20] 16  SpO2:  [94 %-99 %] 94 %  BP: (120-168)/(60-75) 168/74      Weight: 64.4 kg (142 lb)  Body mass index is 22.92 kg/m².  Body surface area is 1.73 meters squared.        Intake/Output Summary (Last 24 hours) at 11/19/2020 0822  Last data filed at 11/19/2020 0612      Gross per 24 hour   Intake 1280 ml   Output 1302 ml   Net -22 ml         Physical Exam  Vitals signs and nursing note reviewed.   Constitutional:       General: She is not in acute distress.     Appearance: She is well-developed.      Comments: Frail appearing   HENT:      Head: Normocephalic and atraumatic.      Nose: Nose normal.      Mouth/Throat:      Mouth: Mucous membranes are dry.      Pharynx: No  oropharyngeal exudate or posterior oropharyngeal erythema.   Eyes:      General: No scleral icterus.     Extraocular Movements: Extraocular movements intact.      Conjunctiva/sclera: Conjunctivae normal.      Pupils: Pupils are equal, round, and reactive to light.   Neck:      Musculoskeletal: Normal range of motion and neck supple.   Cardiovascular:      Rate and Rhythm: Normal rate and regular rhythm.      Pulses: Normal pulses.      Heart sounds: Normal heart sounds. No murmur. No friction rub.   Pulmonary:      Effort: Pulmonary effort is normal. No respiratory distress.      Breath sounds: Normal breath sounds. No wheezing or rales.   Abdominal:      General: Bowel sounds are normal. There is no distension.      Palpations: Abdomen is soft.      Tenderness: There is no abdominal tenderness. There is no guarding or rebound.   Musculoskeletal: Normal range of motion.      Right lower leg: Edema present.      Left lower leg: Edema present.   Lymphadenopathy:      Cervical: No cervical adenopathy.   Skin:     General: Skin is warm and dry.      Coloration: Skin is not pale.      Findings: No erythema.   Neurological:      General: No focal deficit present.      Mental Status: She is alert and oriented to person, place, and time.      Cranial Nerves: No cranial nerve deficit.   Psychiatric:         Mood and Affect: Mood normal.         Behavior: Behavior normal.            Significant Labs:   CBC:   Recent Labs   Lab 11/17/20  1114 11/18/20  0351 11/19/20  0510   WBC 16.86* 10.96 7.77   HGB 8.2* 7.7* 6.8*   HCT 26.4* 26.1* 22.7*    204 237    and CMP:        Recent Labs   Lab 11/18/20  0351 11/19/20  0510    142   K 4.6 3.9   * 119*   CO2 10* 13*   GLU 74 90   BUN 40* 38*   CREATININE 3.6* 3.6*   CALCIUM 7.9* 7.3*   PROT 5.7* 5.1*   ALBUMIN 2.2* 2.1*   BILITOT 2.2* 0.9   ALKPHOS 352* 255*   * 60*   * 90*   ANIONGAP 12 10   EGFRNONAA 11.7* 11.7*         Diagnostic Results:  I have  "reviewed and interpreted all pertinent imaging results/findings within the past 24 hours.         Assessment/Plan:     * Sepsis  Patient presenting with fever and tachycardia. Received 250cc fluid bolus and vancomycin and cefepime in the ED. Patient's only complaints are vertigo-like symptoms which could be secondary to dehydration / sepsis as well. CT head negative for any acute abnormality. CXR with no focal consolidation. She denies dysuria however UA with 2+ leukocytes and few bacteria on microscopy. Also patient complaining of 2 episodes of vomiting with an associated pain radiating to the back. Patient is s/p Whipple procedure, symptoms concerning for potential pancreatitis of remnant pancreas. Patient also with increase in LFT's and bili, concerning for potential hepatic abscess vs. Metastatic disease or external obstructive process. However, nothing suspicious seen on imaging and has had prior LFT elevations that resolved on their own.     Plan:  Antibiotics: Transitioned cefepime to zosyn for better anaerobic coverage, d/c'ed vancomycin as low suspicion for MRSA  Fluids: 250cc in ED, will give additional 1L LR bolus for 30cc/kg crystalloid resuscitation  Source: Intra-abdominal vs. UTI vs. Pancreatitis    Lipase <3. RUQ ultrasound with no focal hepatic parenchymal abnormality and no intra or extrahepatic bile duct dilatation Blood cultures showing gram negative rods x2     Plan:  - FU blood culture speciation and sensitivities   - Urine culture pending; FU   - FU CT abdo/pelvis without contrast to look for further possible sources given increase LFTs      Transaminitis  Please see "sepsis" above    UTI (urinary tract infection)  Please see "sepsis" above    Anemia  Plan:  - Transfusion with 1 U prbc  - FU Fe studies   - FU Haptoglobin   - FU Folate and B12    Seizure disorder  Continuing home keppra 500mg bid    Chronic kidney disease (CKD) stage G4/A3, severely decreased glomerular filtration rate (GFR) " between 15-29 mL/min/1.73 square meter and albuminuria creatinine ratio greater than 300 mg/g  Creatinine 3.4 on admit which appears to be her baseline    - Avoid nephrotoxic agents (patient with reported IV contrast allergy as well)  - renally dose all medications    Adjustment disorder with depressed mood  Patient reports taking amitryptiline nightly and ativan nightly PRN    - Would prefer to avoid these medications in an elderly patient however they are chronic so will continue while inpatient    OAB (overactive bladder)  Patient on Myrbetriq at home, not on forumulary    - Could potentially switch to oxybutinin while inpatient, restart as clinically appropriate    Malignant neoplasm of lower lobe of right lung  Patient with history of multiple cancers, most recently for right lung adenocarcinoma with metastatic disease. Most recent PET concerning for progression of disease with increased metabolic activity in multiple nodules in bilateral lungs. Patient's lab abnormalities have kept her Tarceva on home, plan was to transition to Afatinib if patient could not tolerate Tarceva.    Receives outpatient fluid and electrolyte infusions at oncology infusion center    Meningioma  S/p resection with NSGY. CT head on presentation with stable post-surgical findings.    - Continuing home keppra 500mg bid    Essential hypertension  On amlodipine 5mg qd at home    - Normotensive at this time, will hold in setting of sepsis  - Resume as clinically appropriate           Bia Horner MD  Hematology/Oncology  [facility]

## 2020-11-19 NOTE — PLAN OF CARE
Problem: Physical Therapy Goal  Goal: Physical Therapy Goal  Description: Goals to be met by: 12/3/2020     Patient will increase functional independence with mobility by performin. Supine to sit with Set-up [place]  2. Sit to supine with Set-up [place]  3. Sit to stand transfer with Supervision  4. Bed to chair transfer with Supervision using LRAD  5. Gait  x 250 feet with Supervision using LRAD.   6. Ascend/descend 8 stair with Handrail Stand-by Assistance.   7. Lower extremity exercise program x15 reps per handout, with supervision    Outcome: Ongoing, Progressing

## 2020-11-20 NOTE — PLAN OF CARE
Plan of care discussed with patient at start of shift. Free from falls and injuries. Resting quietly with eyes closed. Respirations even, unlabored. Skin warm and dry. Denies pain. Denies nausea. Vital signs stable. Frequent checks for pain and safety maintained. Bed in lowest position, wheels locked, side rails up x's 2, call light in reach. Instructed to call for assistance as needed, verbalizes understanding. Will continue to monitor.

## 2020-11-20 NOTE — PLAN OF CARE
Problem: Physical Therapy Goal  Goal: Physical Therapy Goal  Description: Goals to be met by: 12/3/2020     Patient will increase functional independence with mobility by performin. Supine to sit with Set-up [place] - Met  2. Sit to supine with Set-up [place] - Met  3. Sit to stand transfer with Supervision - Met  4. Bed to chair transfer with Supervision using LRAD - Met  5. Gait  x 150 feet with Supervision using LRAD.  - Met  6. Ascend/descend 8 stair with Handrail Stand-by Assistance.  - Met  7. Lower extremity exercise program x15 reps per handout, with supervision - Met    Outcome: Met

## 2020-11-20 NOTE — PT/OT/SLP PROGRESS
Physical Therapy Treatment/Discharge    Patient Name:  Naty St   MRN:  [id number]    Recommendations:     Discharge Recommendations:  home health PT   Discharge Equipment Recommendations: none   Barriers to discharge: None    Assessment:     Naty St is a 75 y.o. female admitted with a medical diagnosis of Sepsis.  She presents with the following impairments/functional limitations:  impaired endurance Pt. cooperative and tolerated treatment well. Pt. progressing with mobility and has met goals for acute PT.    Rehab Prognosis: Good; patient would benefit from acute skilled PT services to address these deficits and reach maximum level of function.    Recent Surgery: * No surgery found *      Plan:      (Discontinue acute PT)     · Plan of Care Expires:  12/19/20    Subjective     Chief Complaint: none  Patient/Family Comments/goals: to go home  Pain/Comfort:  · Pain Rating 1: 0/10  · Pain Rating Post-Intervention 1: 0/10      Objective:     Communicated with nursing prior to session.  Patient found seated EOB with peripheral IV upon PT entry to room.     General Precautions: Standard, fall   Orthopedic Precautions:N/A   Braces:       Functional Mobility:  · Transfers:     · Sit to Stand:  supervision with no AD  · Gait: 150' with RW and Supervision without LOB  · Balance: fair+  · Stairs:  Pt ascended/descended 10 stair(s) with No Assistive Device with left handrail with Stand-by Assistance.       AM-PAC 6 CLICK MOBILITY  Turning over in bed (including adjusting bedclothes, sheets and blankets)?: 4  Sitting down on and standing up from a chair with arms (e.g., wheelchair, bedside commode, etc.): 4  Moving from lying on back to sitting on the side of the bed?: 4  Moving to and from a bed to a chair (including a wheelchair)?: 4  Need to walk in hospital room?: 3  Climbing 3-5 steps with a railing?: 3  Basic Mobility Total Score: 22       Therapeutic Activities and Exercises:   Discussed pt.'s  progress, goals, and POC.    Patient left seated EOB with all lines intact and call button in reach..    GOALS:   Multidisciplinary Problems     Physical Therapy Goals        Problem: Physical Therapy Goal    Goal Priority Disciplines Outcome Goal Variances Interventions   Physical Therapy Goal     PT, PT/OT Ongoing, Progressing     Description: Goals to be met by: 12/3/2020     Patient will increase functional independence with mobility by performin. Supine to sit with Set-up [place]  2. Sit to supine with Set-up [place]  3. Sit to stand transfer with Supervision  4. Bed to chair transfer with Supervision using LRAD  5. Gait  x 250 feet with Supervision using LRAD.   6. Ascend/descend 8 stair with Handrail Stand-by Assistance.   7. Lower extremity exercise program x15 reps per handout, with supervision                     Time Tracking:     PT Received On: 20  PT Start Time: 1044     PT Stop Time: 1057  PT Total Time (min): 13 min     Billable Minutes: Gait Training 13    Treatment Type: Treatment  PT/PTA: PT           Ruddy Levine, PT  2020

## 2020-11-20 NOTE — PLAN OF CARE
Problem: Fall Injury Risk  Goal: Absence of Fall and Fall-Related Injury  Outcome: Ongoing, Progressing     Problem: Adult Inpatient Plan of Care  Goal: Plan of Care Review  Outcome: Ongoing, Progressing  Plan of care reviewed with pt. Verbalized understanding. Did not have any questions or concerns at this time. Instructed pt to call for assistance out of bed since risk for falls. Verbalized understanding. Call light within reach. Bed locked and in the lowest position. Non-skid socks and fall risk band on patient. S/p 1U PRBC - tolerated well. Up with PT/OT and up to chair most of the day. Good urine output and good PO intake.

## 2020-11-20 NOTE — DISCHARGE SUMMARY
"[facility]  Hematology/Oncology  Discharge Summary      Patient Name: Naty St  MRN: [id number]  Admission Date: 11/16/2020  Hospital Length of Stay: 4 days  Discharge Date and Time:  11/20/2020 1:25 PM  Attending Physician: Blessing Guzman MD   Discharging Provider: Solange Nicholas MD  Primary Care Provider: Olvin Mars MD    HPI: Mrs. St is a 75 year old lady with a past medical history significant for HTN, DVT, metastatic R lung adenocarcinoma, breast cancer s/p L lumpectomy, meningioma s/p resection, and remote pancreatic cancer s/p whipple who presented to [facility] ED the evening of 11/16/2020 for evaluation of weakness, chills, and dizziness. The patient initially presented with vertigo symptoms stating she was unable to walk due to feeling the "room spinning." She had one prior episode 1 year ago but it was never further evaluated. The patient denies any recent cough, cold, nausea, or diarrhea. She does endorse 2 episodes of vomiting immediately after eating. One was day of presentation and the other was a few days before. She was able to eat normally in between with no symptoms. She denies any sick contacts, she lives with her  and her son visits occasionally. She has associated abdominal pain that is described as radiating to the back and sides of her upper abdomen. Pain is not present when she is not having episodes of vomiting. She endorses decreased PO intake over the past few days but has been able to tolerate water and some food. The patient is accompanied by her  that provides supplemental history. In the ED the patient was persistently febrile and tachycardic. Chest X-ray did not show any acute consolidation and her CT head showed stable post-surgical changes and no acute process. She was admitted to Medical Oncology for further management.    Oncologic History (Per primary oncologist notes):  Initially seen in 1/2016She has a history of pancreatic " "cancer 17 years ago, breast cancer and meningioma, presented to the hospital complaining of loss of consciousness. Had a meningiomaresection done in the past; however underwent neurosurgical resection with Dr. 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    Additional immunohistochemical stains were performed which revealed the tumor cells to be are positive for TTF1 and negative for ER and GCDFP. The morphology and TTF1 positivity are most consistent with lung primary.    Of note, imaging scan at the end of January 2016 revealed a mass in the lung at 2 cm in the medial aspect of the apical segment of the right upper lobe abutting the mediastinum at the level of the azygous vein and abutting and possibly encasing the segmental bronchi and vessels of the apical segment of the right upper lobe and no pleural fluid was present. Also, there is an enlarged right paratracheal lymph node. No evidence of any metastatic disease at the pancreatic site with postoperative changes post Whipple disease   PET Scan from 2/15/16 showed "Hypermetabolic mass in the right lung apex consistent with a primary malignancy. Hypermetabolic mediastinal lymph nodes consistent with metastatic disease. Right sacral hypermetabolic lesion consistent with metastatic disease, noting additional mildly sclerotic lesions in multiple vertebral bodies which do not demonstrate abnormal hypermetabolism  She underwent IR bone biopsy which revealed metastatic adenocarcinoma of lung origin. 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  She has completed focal RT to brain lesions in March 2016.    PET scan from 6/6/16 shows "Dramatic almost complete response to therapy."  Lovenox started after US lower ext 6/7/16 shows Acute complete occlusion of one of the left posterior tibial vein.Remote partial thrombus of the proximal left superficial femoral " "vein.  She is on Tarceva.   12/6/16 PET scan reveals "Stable right upper lobe lesion and right hilar lymph node. No new lesions identified. Trace left pleural effusion".  1/27/17 Renal u/s "Medical renal disease. Nonobstructive right nephrolithiasis"  1/31/17 PET - "Right upper lobe nodule and right hilar lymph node similar and very low grade activity.  There is no definite evidence of recurrence."   11/9/17 PET "In this patient with history of lung cancer, there is interval increase in size and hypermetabolism of a right upper lobe lung lesion concerning for recurrent disease. Additionally, there is interval appearance of a hypermetabolic paratracheal lymph node suspicious for metastatic disease"     She progressed on Tarceva She underwent EBUS on 12/5/17. Pathology revealed adenocarcinoma but T790m could not be done as quantity was not insufficient. Her PET scan from 1/30/18 revealed The patient's previously identified abnormal lesions is slightly greater uptake of FDG on today's study compared with prior exam. These findings are concerning for progression of disease"     She discontinued Tagrisso in April 2018. Restaging scans from 6/4/18 revealed "Stable appearance of a spiculated right upper lobe pulmonary lesion.  Additional irregular focus superior to the lesion in the right lung apex is stable and may represent scar or additional lesion; although this was not hypermetabolic on prior PET-CT.  No new pulmonary lesions. 1.3 cm subcarinal lymph node, not hypermetabolic on prior PET-CT. Stable postsurgical changes of a Whipple procedure. Bilateral nonobstructing nephrolithiasis.  Stable sclerotic focus in the T5 vertebral body, possibly a bone island as this was not hypermetabolic on prior PET-CT. Small hiatal hernia. RECIST SUMMARY: Date of prior examination for comparison 01/30/2018 Lesion 1: Right upper lobe 1.3 cm Series 2 image 31 prior measurement 1.3 cm"  Bone scan also from 6/4/18 revealed no evidence of " "mets.     Her PET scan from 7/11/18 revealed "Right suprahilar lesion SUV max 3.76, previously 6.86. Pretracheal lymph node SUV max 2.55, previously 3.79. There is physiologic intracranial, head, and neck activity.  There is muscle activation.  There is vocal cord activation.  There is physiologic liver, spleen, GI and  activity.  There is a hiatal hernia.  Pelvic organs show nothing unusual.  No bone lesions are seen.  There are areas of benign appearing lung scar"  She notes fatigue.  She denies any nausea, vomiting, diarrhea, constipation, abdominal pain, weight loss or loss of appetite, chest pain, shortness of breath, leg swelling, fatigue, pain, headache, dizziness, or mood changes. Her ECOG PS is 2. She is accompanied by her  and son     She has been on Tarceva since 6/5/18. She completed SBRT to her right lung lesions.      Restaging PET scan from 10/15/2020 reveals "In this patient with history of lung cancer, the previously described hypermetabolic right paratracheal and right hilar lesions appears stable in regards to metabolic activity compared to prior exam.  However, there has been interval increase in size and metabolic activity of additional pulmonary nodules scattered within both lungs, as further detailed above.  Findings concerning for disease progression/metastases.zAdditional findings as above"  She was hospitalized from 10/15-10/17/2020 with renal failure, Tarceva was held.      * No surgery found *     Hospital Course: Ms. Maciel Alfonso was admitted for fever, vomiting, abdominal pain and dizziness. She was given zosyn and IVF with improvement. Source suspicious for either intra-abdominal or from UTI. CXR was not concerning for infectious process. Blood cultures x2 showing klebsiella. CT A/P did not find intra abdominal infection. Anemia to hgb of 6.8 requiring 1u prbc on 11/19. Klebsiella returned pan sensitive and she was discharged with 2 week supply of renally adjusted " ciprofloxacin. She remained afebrile for > 24 hours and will be discharged home to follow up with her oncolgist Dr. Vazquez. She will continue to hold her anti neoplastic therapy until then.     Consults:   Consults (From admission, onward)        Status Ordering Provider     Inpatient consult to Hematology/Oncology Psychology  Once     Provider:  (Not yet assigned)    Completed TIM DOSS consult to case management  Once     Provider:  (Not yet assigned)    Acknowledged MUNDO LUIS          Significant Diagnostic Studies: Labs:   CMP   Recent Labs   Lab 11/19/20  0510 11/20/20  0355    143   K 3.9 3.9   * 119*   CO2 13* 15*   GLU 90 85   BUN 38* 36*   CREATININE 3.6* 3.7*   CALCIUM 7.3* 7.3*   PROT 5.1* 5.4*   ALBUMIN 2.1* 2.2*   BILITOT 0.9 0.9   ALKPHOS 255* 208*   AST 60* 34   ALT 90* 68*   ANIONGAP 10 9   ESTGFRAFRICA 13.4* 13.0*   EGFRNONAA 11.7* 11.3*    and CBC   Recent Labs   Lab 11/19/20  0510 11/20/20  0355   WBC 7.77 6.82   HGB 6.8* 8.1*   HCT 22.7* 26.4*    233       Pending Diagnostic Studies:     None        Final Active Diagnoses:    Diagnosis Date Noted POA    PRINCIPAL PROBLEM:  Sepsis [A41.9] 11/17/2020 Yes    Transaminitis [R74.01] 11/17/2020 Yes    Fever [R50.9] 11/16/2020 Yes    UTI (urinary tract infection) [N39.0] 04/15/2018 Yes    Anemia [D64.9] 04/11/2018 Yes    Seizure disorder [G40.909] 04/09/2018 Yes    Adenocarcinoma of lung, right [C34.91] 12/05/2017 Yes    Folliculitis [L73.9] 10/15/2017 Yes    Right-sided cerebrovascular accident (CVA) [I63.9] 10/11/2017 Yes    Chronic kidney disease (CKD) stage G4/A3, severely decreased glomerular filtration rate (GFR) between 15-29 mL/min/1.73 square meter and albuminuria creatinine ratio greater than 300 mg/g [N18.4] 04/07/2017 Yes    Adjustment disorder with depressed mood [F43.21] 10/20/2016 Yes    OAB (overactive bladder) [N32.81] 10/18/2016 Yes    Malignant neoplasm of lower lobe of right lung  [C34.31] 03/17/2016 Yes    Meningioma [D32.9] 01/12/2016 Yes     Chronic    History of breast cancer [Z85.3] 07/31/2015 Not Applicable    Essential hypertension [I10] 08/25/2014 Yes      Problems Resolved During this Admission:      Discharged Condition: good    Disposition: Home or Self Care    Follow Up:    Patient Instructions:      Diet Adult Regular     Medications:  Reconciled Home Medications:      Medication List      START taking these medications    ciprofloxacin HCl 500 MG tablet  Commonly known as: CIPRO  Take 1 tablet (500 mg total) by mouth once daily. for 13 doses  Start taking on: November 21, 2020        CONTINUE taking these medications    albuterol-ipratropium 2.5 mg-0.5 mg/3 mL nebulizer solution  Commonly known as: DUO-NEB  Take 3 mLs by nebulization every 6 (six) hours as needed for Wheezing or Shortness of Breath. Rescue     amitriptyline 50 MG tablet  Commonly known as: ELAVIL  TAKE ONE TABLET BY MOUTH EVERY EVENING     amLODIPine 5 MG tablet  Commonly known as: NORVASC  Take 1 tablet (5 mg total) by mouth once daily.     ammonium lactate 12 % lotion  Commonly known as: LAC-HYDRIN  Apply topically as needed for Dry Skin.     calcium carbonate 500 mg calcium (1,250 mg) tablet  Commonly known as: OS-UNIQUE  Take 2 tablets (1,000 mg total) by mouth once daily.     clindamycin phosphate 1% 1 % gel  Commonly known as: CLINDAGEL  APPLY TOPICALLY TWICE DAILY; apply to back area     CREON Cpdr  Generic drug: lipase-protease-amylase 12,000-38,000-60,000 units  TAKE ONE CAPSULE BY MOUTH THREE TIMES A DAY WITH MEALS     denosumab 120 mg/1.7 mL (70 mg/mL) Soln  Commonly known as: XGEVA  Inject 120 mg into the skin every 28 days.     ELIQUIS 5 mg Tab  Generic drug: apixaban  TAKE ONE TABLET BY MOUTH TWICE DAILY     ketoconazole 2 % cream  Commonly known as: NIZORAL  Apply topically once daily.     levETIRAcetam 500 MG Tab  Commonly known as: KEPPRA  TAKE ONE TABLET BY MOUTH TWICE DAILY. no further refills  without APPOINTMENT     LORazepam 1 MG tablet  Commonly known as: ATIVAN  TAKE ONE TABLET (1mg) BY MOUTH TWICE DAILY     multivitamin per tablet  Commonly known as: THERAGRAN  Take 1 tablet by mouth once daily.     MYRBETRIQ 50 mg Tb24  Generic drug: mirabegron  TAKE ONE TABLET BY MOUTH ONCE DAILY     polyethylene glycol 17 gram/dose powder  Commonly known as: GLYCOLAX     senna-docusate 8.6-50 mg 8.6-50 mg per tablet  Commonly known as: SENNA LAXATIVE-STOOL SOFTENER  Take 1 tablet by mouth once daily.     TARCEVA 150 MG tablet  Generic drug: erlotinib  1 TAB        STOP taking these medications    doxycycline 100 MG tablet  Commonly known as: VIBRA-TABS     mesalamine 1000 MG Supp  Commonly known as: CANASA     pantoprazole 40 MG tablet  Commonly known as: PROTONIX            Solange Nicholas MD  Hematology/Oncology  [facility]

## 2020-11-20 NOTE — PROGRESS NOTES
"[facility]  Hematology/Oncology  Progress Note    Patient Name: Naty St  Admission Date: 11/16/2020  Hospital Length of Stay: 4 days  Code Status: DNR     Subjective:     HPI:  Mrs. St is a 75 year old lady with a past medical history significant for HTN, DVT, metastatic R lung adenocarcinoma, breast cancer s/p L lumpectomy, meningioma s/p resection, and remote pancreatic cancer s/p whipple who [facility] the evening of 11/16/2020 for evaluation of weakness, chills, and dizziness. The patient initially presented with vertigo symptoms stating she was unable to walk due to feeling the "room spinning." She had one prior episode 1 year ago but it was never further evaluated. The patient denies any recent cough, cold, nausea, or diarrhea. She does endorse 2 episodes of vomiting immediately after eating. One was day of presentation and the other was a few days before. She was able to eat normally in between with no symptoms. She denies any sick contacts, she lives with her  and her son visits occasionally. She has associated abdominal pain that is described as radiating to the back and sides of her upper abdomen. Pain is not present when she is not having episodes of vomiting. She endorses decreased PO intake over the past few days but has been able to tolerate water and some food. The patient is accompanied by her  that provides supplemental history. In the ED the patient was persistently febrile and tachycardic. Chest X-ray did not show any acute consolidation and her CT head showed stable post-surgical changes and no acute process. She was admitted to Medical Oncology for further management.    Oncologic History (Per primary oncologist notes):  Initially seen in 1/2016She has a history of pancreatic cancer 17 years ago, breast cancer and meningioma, presented to the hospital complaining of loss of consciousness. Had a meningiomaresection done in the past; " "however underwent neurosurgical resection with Dr. 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    Additional immunohistochemical stains were performed which revealed the tumor cells to be are positive for TTF1 and negative for ER and GCDFP. The morphology and TTF1 positivity are most consistent with lung primary.    Of note, imaging scan at the end of January 2016 revealed a mass in the lung at 2 cm in the medial aspect of the apical segment of the right upper lobe abutting the mediastinum at the level of the azygous vein and abutting and possibly encasing the segmental bronchi and vessels of the apical segment of the right upper lobe and no pleural fluid was present. Also, there is an enlarged right paratracheal lymph node. No evidence of any metastatic disease at the pancreatic site with postoperative changes post Whipple disease   PET Scan from 2/15/16 showed "Hypermetabolic mass in the right lung apex consistent with a primary malignancy. Hypermetabolic mediastinal lymph nodes consistent with metastatic disease. Right sacral hypermetabolic lesion consistent with metastatic disease, noting additional mildly sclerotic lesions in multiple vertebral bodies which do not demonstrate abnormal hypermetabolism  She underwent IR bone biopsy which revealed metastatic adenocarcinoma of lung origin. 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  She has completed focal RT to brain lesions in March 2016.    PET scan from 6/6/16 shows "Dramatic almost complete response to therapy."  Lovenox started after US lower ext 6/7/16 shows Acute complete occlusion of one of the left posterior tibial vein.Remote partial thrombus of the proximal left superficial femoral vein.  She is on Tarceva.   12/6/16 PET scan reveals "Stable right upper lobe lesion and right hilar lymph node. No new lesions identified. Trace left pleural " "effusion".  1/27/17 Renal u/s "Medical renal disease. Nonobstructive right nephrolithiasis"  1/31/17 PET - "Right upper lobe nodule and right hilar lymph node similar and very low grade activity.  There is no definite evidence of recurrence."   11/9/17 PET "In this patient with history of lung cancer, there is interval increase in size and hypermetabolism of a right upper lobe lung lesion concerning for recurrent disease. Additionally, there is interval appearance of a hypermetabolic paratracheal lymph node suspicious for metastatic disease"     She progressed on Tarceva She underwent EBUS on 12/5/17. Pathology revealed adenocarcinoma but T790m could not be done as quantity was not insufficient. Her PET scan from 1/30/18 revealed The patient's previously identified abnormal lesions is slightly greater uptake of FDG on today's study compared with prior exam. These findings are concerning for progression of disease"     She discontinued Tagrisso in April 2018. Restaging scans from 6/4/18 revealed "Stable appearance of a spiculated right upper lobe pulmonary lesion.  Additional irregular focus superior to the lesion in the right lung apex is stable and may represent scar or additional lesion; although this was not hypermetabolic on prior PET-CT.  No new pulmonary lesions. 1.3 cm subcarinal lymph node, not hypermetabolic on prior PET-CT. Stable postsurgical changes of a Whipple procedure. Bilateral nonobstructing nephrolithiasis.  Stable sclerotic focus in the T5 vertebral body, possibly a bone island as this was not hypermetabolic on prior PET-CT. Small hiatal hernia. RECIST SUMMARY: Date of prior examination for comparison 01/30/2018 Lesion 1: Right upper lobe 1.3 cm Series 2 image 31 prior measurement 1.3 cm"  Bone scan also from 6/4/18 revealed no evidence of mets.     Her PET scan from 7/11/18 revealed "Right suprahilar lesion SUV max 3.76, previously 6.86. Pretracheal lymph node SUV max 2.55, previously 3.79. " "There is physiologic intracranial, head, and neck activity.  There is muscle activation.  There is vocal cord activation.  There is physiologic liver, spleen, GI and  activity.  There is a hiatal hernia.  Pelvic organs show nothing unusual.  No bone lesions are seen.  There are areas of benign appearing lung scar"  She notes fatigue.  She denies any nausea, vomiting, diarrhea, constipation, abdominal pain, weight loss or loss of appetite, chest pain, shortness of breath, leg swelling, fatigue, pain, headache, dizziness, or mood changes. Her ECOG PS is 2. She is accompanied by her  and son     She has been on Tarceva since 6/5/18. She completed SBRT to her right lung lesions.      Restaging PET scan from 10/15/2020 reveals "In this patient with history of lung cancer, the previously described hypermetabolic right paratracheal and right hilar lesions appears stable in regards to metabolic activity compared to prior exam.  However, there has been interval increase in size and metabolic activity of additional pulmonary nodules scattered within both lungs, as further detailed above.  Findings concerning for disease progression/metastases.zAdditional findings as above"  She was hospitalized from 10/15-10/17/2020 with renal failure, Tarceva was held.      Interval History: Responded appropriately to blood transfusion yesterday. No acute events overnight. Afebrile. Awaiting sensitivities.     Oncology Treatment Plan:   [No treatment plan]    Medications:  Continuous Infusions:  Scheduled Meds:   amitriptyline  50 mg Oral QHS    amLODIPine  5 mg Oral Daily    apixaban  5 mg Oral BID    levETIRAcetam  500 mg Oral BID    lipase-protease-amylase 12,000-38,000-60,000 units  1 capsule Oral TID WM    piperacillin-tazobactam (ZOSYN) IVPB  4.5 g Intravenous Q12H    polyethylene glycol  17 g Oral Daily     PRN Meds:sodium chloride, albuterol-ipratropium, dextrose 50%, dextrose 50%, glucagon (human recombinant), " glucose, glucose, LORazepam, melatonin, ondansetron, sodium chloride 0.9%     Review of Systems   Constitutional: Positive for chills, fatigue and fever. Negative for unexpected weight change.   HENT: Negative for ear pain, facial swelling, sneezing and sore throat.    Eyes: Negative for visual disturbance.   Respiratory: Negative for cough, shortness of breath and wheezing.    Cardiovascular: Positive for leg swelling. Negative for chest pain and palpitations.   Gastrointestinal: Negative for abdominal pain, constipation, diarrhea, nausea and vomiting.   Genitourinary: Negative for difficulty urinating, dysuria, enuresis and flank pain.   Musculoskeletal: Negative for arthralgias and myalgias.   Skin: Negative for pallor and rash.   Neurological: Positive for light-headedness. Negative for weakness and headaches.   Hematological: Negative for adenopathy.   Psychiatric/Behavioral: Negative for confusion.     Objective:     Vital Signs (Most Recent):  Temp: 98.2 °F (36.8 °C) (11/20/20 0353)  Pulse: 79 (11/20/20 0353)  Resp: 18 (11/20/20 0353)  BP: (!) 150/71 (11/20/20 0353)  SpO2: (!) 94 % (11/20/20 0353) Vital Signs (24h Range):  Temp:  [97.1 °F (36.2 °C)-98.8 °F (37.1 °C)] 98.2 °F (36.8 °C)  Pulse:  [78-88] 79  Resp:  [16-20] 18  SpO2:  [94 %-100 %] 94 %  BP: (136-168)/(62-74) 150/71     Weight: 64.4 kg (141 lb 15.6 oz)  Body mass index is 22.92 kg/m².  Body surface area is 1.73 meters squared.      Intake/Output Summary (Last 24 hours) at 11/20/2020 0741  Last data filed at 11/20/2020 0400  Gross per 24 hour   Intake 1820 ml   Output 2100 ml   Net -280 ml       Physical Exam  Vitals signs and nursing note reviewed.   Constitutional:       General: She is not in acute distress.     Appearance: She is well-developed.      Comments: Frail appearing   HENT:      Head: Normocephalic and atraumatic.      Nose: Nose normal.      Mouth/Throat:      Mouth: Mucous membranes are dry.      Pharynx: No oropharyngeal exudate or  posterior oropharyngeal erythema.   Eyes:      General: No scleral icterus.     Extraocular Movements: Extraocular movements intact.      Conjunctiva/sclera: Conjunctivae normal.      Pupils: Pupils are equal, round, and reactive to light.   Neck:      Musculoskeletal: Normal range of motion and neck supple.   Cardiovascular:      Rate and Rhythm: Normal rate and regular rhythm.      Pulses: Normal pulses.      Heart sounds: Normal heart sounds. No murmur. No friction rub.   Pulmonary:      Effort: Pulmonary effort is normal. No respiratory distress.      Breath sounds: Normal breath sounds. No wheezing or rales.   Abdominal:      General: Bowel sounds are normal. There is no distension.      Palpations: Abdomen is soft.      Tenderness: There is no abdominal tenderness. There is no guarding or rebound.   Musculoskeletal: Normal range of motion.      Right lower leg: Edema present.      Left lower leg: Edema present.   Lymphadenopathy:      Cervical: No cervical adenopathy.   Skin:     General: Skin is warm and dry.      Coloration: Skin is not pale.      Findings: No erythema.   Neurological:      General: No focal deficit present.      Mental Status: She is alert and oriented to person, place, and time.      Cranial Nerves: No cranial nerve deficit.   Psychiatric:         Mood and Affect: Mood normal.         Behavior: Behavior normal.         Significant Labs:   CBC:   Recent Labs   Lab 11/19/20  0510 11/20/20  0355   WBC 7.77 6.82   HGB 6.8* 8.1*   HCT 22.7* 26.4*    233    and CMP:   Recent Labs   Lab 11/19/20  0510 11/20/20  0355    143   K 3.9 3.9   * 119*   CO2 13* 15*   GLU 90 85   BUN 38* 36*   CREATININE 3.6* 3.7*   CALCIUM 7.3* 7.3*   PROT 5.1* 5.4*   ALBUMIN 2.1* 2.2*   BILITOT 0.9 0.9   ALKPHOS 255* 208*   AST 60* 34   ALT 90* 68*   ANIONGAP 10 9   EGFRNONAA 11.7* 11.3*       Diagnostic Results:  I have reviewed and interpreted all pertinent imaging results/findings within the past  "24 hours.    Assessment/Plan:     * Sepsis  Patient presenting with fever and tachycardia. Received 250cc fluid bolus and vancomycin and cefepime in the ED. Patient's only complaints are vertigo-like symptoms which could be secondary to dehydration / sepsis as well. CT head negative for any acute abnormality. CXR with no focal consolidation. She denies dysuria however UA with 2+ leukocytes and few bacteria on microscopy. Also patient complaining of 2 episodes of vomiting with an associated pain radiating to the back. Patient is s/p Whipple procedure, symptoms concerning for potential pancreatitis of remnant pancreas. Patient also with increase in LFT's and bili, concerning for potential hepatic abscess vs. Metastatic disease or external obstructive process. However, nothing suspicious seen on imaging and has had prior LFT elevations that resolved on their own.     Plan:  Antibiotics: Transitioned cefepime to zosyn for better anaerobic coverage, d/c'ed vancomycin as low suspicion for MRSA  Fluids: 250cc in ED, will give additional 1L LR bolus for 30cc/kg crystalloid resuscitation  Source: Intra-abdominal vs. UTI vs. Pancreatitis    Lipase <3. RUQ ultrasound with no focal hepatic parenchymal abnormality and no intra or extrahepatic bile duct dilatation Blood cultures showing gram negative rods x2     Plan:  - FU blood culture speciation and sensitivities   - Urine culture pending; FU   - FU CT abdo/pelvis without contrast to look for further possible sources given increase LFTs      Transaminitis  Please see "sepsis" above    UTI (urinary tract infection)  Please see "sepsis" above    Anemia  Plan:  - S/p transfusion with 1 U prbc  - FU Fe studies   - FU Haptoglobin   - FU Folate and B12    Seizure disorder  Continuing home keppra 500mg bid    Chronic kidney disease (CKD) stage G4/A3, severely decreased glomerular filtration rate (GFR) between 15-29 mL/min/1.73 square meter and albuminuria creatinine ratio greater " than 300 mg/g  Creatinine 3.4 on admit which appears to be her baseline, 3.7 today    - Avoid nephrotoxic agents (patient with reported IV contrast allergy as well)  - renally dose all medications    Adjustment disorder with depressed mood  Patient reports taking amitryptiline nightly and ativan nightly PRN    - Would prefer to avoid these medications in an elderly patient however they are chronic so will continue while inpatient    OAB (overactive bladder)  Patient on Myrbetriq at home, not on forumulary    - Could potentially switch to oxybutinin while inpatient, restart as clinically appropriate    Malignant neoplasm of lower lobe of right lung  Patient with history of multiple cancers, most recently for right lung adenocarcinoma with metastatic disease. Most recent PET concerning for progression of disease with increased metabolic activity in multiple nodules in bilateral lungs. Patient's lab abnormalities have kept her Tarceva on home, plan was to transition to Afatinib if patient could not tolerate Tarceva.    Receives outpatient fluid and electrolyte infusions at oncology infusion center    Meningioma  S/p resection with NSGY. CT head on presentation with stable post-surgical findings.    - Continuing home keppra 500mg bid    Essential hypertension  On amlodipine 5mg qd at home    - Continue             Solange Nicholas MD  Hematology/Oncology  [facility]

## 2020-11-20 NOTE — PT/OT/SLP PROGRESS
Occupational Therapy   Treatment    Name: Naty St  MRN: [id number]  Admitting Diagnosis:  Sepsis       Recommendations:     Discharge Recommendations: home with home health  Discharge Equipment Recommendations:  none  Barriers to discharge:  None    Assessment:     Naty St is a 75 y.o. female with a medical diagnosis of Sepsis.  She presents supine and requiring toileting.  Supervision for safe toileting with CGA for transfer to bathroom. Discharge expected and with ADL training for safe return to home.  UE therex/HEP provided.  Energy conservation and safety. . Performance deficits affecting function are impaired endurance, impaired self care skills.     Rehab Prognosis:  Good; patient would benefit from acute skilled OT services to address these deficits and reach maximum level of function.       Plan:     Patient to be seen 2 x/week to address the above listed problems via self-care/home management, therapeutic activities, therapeutic exercises  · Plan of Care Expires: 12/17/20  · Plan of Care Reviewed with: patient    Subjective     Pain/Comfort:  · Pain Rating 1: 0/10    Objective:     Communicated with: RN prior to session.  Patient found supine with peripheral IV upon OT entry to room.    General Precautions: Standard, fall   Orthopedic Precautions:N/A   Braces: N/A     Occupational Performance:     Bed Mobility:    · Patient completed Rolling/Turning to Left with  independence  · Patient completed Rolling/Turning to Right with independence  · Patient completed Scooting/Bridging with independence  · Patient completed Supine to Sit with independence  · Patient completed Sit to Supine with independence     Functional Mobility/Transfers:  · Patient completed Sit <> Stand Transfer with contact guard assistance  with  hand-held assist   · Patient completed Bed <> Chair Transfer using Step Transfer technique with contact guard assistance with hand-held assist  · Patient completed Toilet  Transfer Step Transfer technique with contact guard assistance with  hand-held assist      Activities of Daily Living:  · Grooming: independence seated with set up  · Upper Body Dressing: modified independence    · Toileting: contact guard assistance        [facility] 6 Click ADL: 22    Treatment & Education:  Pt educated on safety, role of OT, importance of increased participation in self care for gains , expectations for participation, expectations for gains, POC, energy conservation, caregiver strain. White board updated.   - ADL training  - UE therex/HEP    Patient left up in chair with all lines intactEducation:      GOALS:   Multidisciplinary Problems     Occupational Therapy Goals        Problem: Occupational Therapy Goal    Goal Priority Disciplines Outcome Interventions   Occupational Therapy Goal     OT, PT/OT Ongoing, Progressing    Description: Goals to be met by: 12/1/2020    Patient will increase functional independence with ADLs by performing:    LE Dressing with [place].  Grooming while standing with [place].  Toileting from toilet with [place] for hygiene and clothing management.   Stand pivot transfers with [place].  Toilet transfer to toilet with [place].                     Time Tracking:     OT Date of Treatment: 11/20/20  OT Start Time: 1025  OT Stop Time: 1050  OT Total Time (min): 25 min    Billable Minutes:Self Care/Home Management 15  Therapeutic Exercise 10    Azalia Sim, OT  11/20/2020

## 2020-11-20 NOTE — ASSESSMENT & PLAN NOTE
Creatinine 3.4 on admit which appears to be her baseline, 3.7 today    - Avoid nephrotoxic agents (patient with reported IV contrast allergy as well)  - renally dose all medications

## 2020-11-20 NOTE — SUBJECTIVE & OBJECTIVE
Interval History: Responded appropriately to blood transfusion yesterday. No acute events overnight. Afebrile. Awaiting sensitivities.     Oncology Treatment Plan:   [No treatment plan]    Medications:  Continuous Infusions:  Scheduled Meds:   amitriptyline  50 mg Oral QHS    amLODIPine  5 mg Oral Daily    apixaban  5 mg Oral BID    levETIRAcetam  500 mg Oral BID    lipase-protease-amylase 12,000-38,000-60,000 units  1 capsule Oral TID WM    piperacillin-tazobactam (ZOSYN) IVPB  4.5 g Intravenous Q12H    polyethylene glycol  17 g Oral Daily     PRN Meds:sodium chloride, albuterol-ipratropium, dextrose 50%, dextrose 50%, glucagon (human recombinant), glucose, glucose, LORazepam, melatonin, ondansetron, sodium chloride 0.9%     Review of Systems   Constitutional: Positive for chills, fatigue and fever. Negative for unexpected weight change.   HENT: Negative for ear pain, facial swelling, sneezing and sore throat.    Eyes: Negative for visual disturbance.   Respiratory: Negative for cough, shortness of breath and wheezing.    Cardiovascular: Positive for leg swelling. Negative for chest pain and palpitations.   Gastrointestinal: Negative for abdominal pain, constipation, diarrhea, nausea and vomiting.   Genitourinary: Negative for difficulty urinating, dysuria, enuresis and flank pain.   Musculoskeletal: Negative for arthralgias and myalgias.   Skin: Negative for pallor and rash.   Neurological: Positive for light-headedness. Negative for weakness and headaches.   Hematological: Negative for adenopathy.   Psychiatric/Behavioral: Negative for confusion.     Objective:     Vital Signs (Most Recent):  Temp: 98.2 °F (36.8 °C) (11/20/20 0353)  Pulse: 79 (11/20/20 0353)  Resp: 18 (11/20/20 0353)  BP: (!) 150/71 (11/20/20 0353)  SpO2: (!) 94 % (11/20/20 0353) Vital Signs (24h Range):  Temp:  [97.1 °F (36.2 °C)-98.8 °F (37.1 °C)] 98.2 °F (36.8 °C)  Pulse:  [78-88] 79  Resp:  [16-20] 18  SpO2:  [94 %-100 %] 94 %  BP:  (136-168)/(62-74) 150/71     Weight: 64.4 kg (141 lb 15.6 oz)  Body mass index is 22.92 kg/m².  Body surface area is 1.73 meters squared.      Intake/Output Summary (Last 24 hours) at 11/20/2020 0741  Last data filed at 11/20/2020 0400  Gross per 24 hour   Intake 1820 ml   Output 2100 ml   Net -280 ml       Physical Exam  Vitals signs and nursing note reviewed.   Constitutional:       General: She is not in acute distress.     Appearance: She is well-developed.      Comments: Frail appearing   HENT:      Head: Normocephalic and atraumatic.      Nose: Nose normal.      Mouth/Throat:      Mouth: Mucous membranes are dry.      Pharynx: No oropharyngeal exudate or posterior oropharyngeal erythema.   Eyes:      General: No scleral icterus.     Extraocular Movements: Extraocular movements intact.      Conjunctiva/sclera: Conjunctivae normal.      Pupils: Pupils are equal, round, and reactive to light.   Neck:      Musculoskeletal: Normal range of motion and neck supple.   Cardiovascular:      Rate and Rhythm: Normal rate and regular rhythm.      Pulses: Normal pulses.      Heart sounds: Normal heart sounds. No murmur. No friction rub.   Pulmonary:      Effort: Pulmonary effort is normal. No respiratory distress.      Breath sounds: Normal breath sounds. No wheezing or rales.   Abdominal:      General: Bowel sounds are normal. There is no distension.      Palpations: Abdomen is soft.      Tenderness: There is no abdominal tenderness. There is no guarding or rebound.   Musculoskeletal: Normal range of motion.      Right lower leg: Edema present.      Left lower leg: Edema present.   Lymphadenopathy:      Cervical: No cervical adenopathy.   Skin:     General: Skin is warm and dry.      Coloration: Skin is not pale.      Findings: No erythema.   Neurological:      General: No focal deficit present.      Mental Status: She is alert and oriented to person, place, and time.      Cranial Nerves: No cranial nerve deficit.    Psychiatric:         Mood and Affect: Mood normal.         Behavior: Behavior normal.         Significant Labs:   CBC:   Recent Labs   Lab 11/19/20  0510 11/20/20  0355   WBC 7.77 6.82   HGB 6.8* 8.1*   HCT 22.7* 26.4*    233    and CMP:   Recent Labs   Lab 11/19/20  0510 11/20/20  0355    143   K 3.9 3.9   * 119*   CO2 13* 15*   GLU 90 85   BUN 38* 36*   CREATININE 3.6* 3.7*   CALCIUM 7.3* 7.3*   PROT 5.1* 5.4*   ALBUMIN 2.1* 2.2*   BILITOT 0.9 0.9   ALKPHOS 255* 208*   AST 60* 34   ALT 90* 68*   ANIONGAP 10 9   EGFRNONAA 11.7* 11.3*       Diagnostic Results:  I have reviewed and interpreted all pertinent imaging results/findings within the past 24 hours.

## 2020-11-20 NOTE — NURSING
Pt discharged to home at this time per MD order.  Med rec completed by MD prior to discharge and copy of current med list given to pt.  All discharge instructions, medications, prescriptions, and follow-up appointments reviewed with pt; copy given and all questions answered to pt's satisfaction. R hand PIV d/c'd at this time; pt with no IV access at time of discharge.  Pt ambulating in room with steady gait; tolerating regular diet; voiding without difficulty; no c/o pain at this time. Cipro delivered to pt at bedside prior to pt leaving floor.  All VSS; O2 sats WNL on RA.  Pt left floor via wheelchair; accompanied by pt escort and family; no distress noted.

## 2020-11-20 NOTE — PLAN OF CARE
[facility]    HOME HEALTH ORDERS  FACE TO FACE ENCOUNTER    Patient Name: Naty St  YOB: 1945    PCP: Olvni Mars MD   PCP Address: [address] / [place] [address]  PCP Phone Number: [phone]  PCP Fax: [phone]    Encounter Date: 11/20/2020    Admit to Home Health    Diagnoses:  Active Hospital Problems    Diagnosis  POA    *Sepsis [A41.9]  Yes    Transaminitis [R74.01]  Yes    Fever [R50.9]  Yes    UTI (urinary tract infection) [N39.0]  Yes    Anemia [D64.9]  Yes    Seizure disorder [G40.909]  Yes    Adenocarcinoma of lung, right [C34.91]  Yes    Folliculitis [L73.9]  Yes    Right-sided cerebrovascular accident (CVA) [I63.9]  Yes    Chronic kidney disease (CKD) stage G4/A3, severely decreased glomerular filtration rate (GFR) between 15-29 mL/min/1.73 square meter and albuminuria creatinine ratio greater than 300 mg/g [N18.4]  Yes    Adjustment disorder with depressed mood [F43.21]  Yes    OAB (overactive bladder) [N32.81]  Yes    Malignant neoplasm of lower lobe of right lung [C34.31]  Yes    Meningioma [D32.9]  Yes     Chronic    History of breast cancer [Z85.3]  Not Applicable    Essential hypertension [I10]  Yes      Resolved Hospital Problems   No resolved problems to display.       Future Appointments   Date Time Provider Department Center   11/20/2020  3:00 PM Usama Packer MD [facility] NEPHRO [facility]   12/1/2020  9:00 AM Alex Carmona MD [facility] GASTRO [place]   2/10/2021  1:30 PM Jean Carlos Rock MD [facility] ERNESTINE EPI [facility]           I have seen and examined this patient face to face today. My clinical findings that support the need for the home health skilled services and home bound status are the following:  Weakness/numbness causing balance and gait disturbance due to Weakness/Debility, Anemia and Malignancy/Cancer making it taxing to leave home.  Requiring assistive device to leave home due to unsteady gait caused  by  Weakness/Debility, Anemia and Malignancy/Cancer.  Patient with medication mismanagement issues requiring home bound status as evidenced by  Poor understanding of medication regimen/dosage and Poor adherence to medication regimen/dosage.  Medical restrictions requiring assistance of another human to leave home due to  Unstable ambulation, Frequent Falls and Decreased range of motions in extremities.    Allergies:  Review of patient's allergies indicates:   Allergen Reactions    Tobradex [tobramycin-dexamethasone] Swelling    Iodinated contrast media Hives    Latex Rash and Hives    Phenytoin sodium extended Other (See Comments) and Rash       Diet: regular diet    Activities: activity as tolerated    Nursing:   SN to complete comprehensive assessment including routine vital signs. Instruct on disease process and s/s of complications to report to MD. Review/verify medication list sent home with the patient at time of discharge  and instruct patient/caregiver as needed. Frequency may be adjusted depending on start of care date.    Notify MD if SBP > 160 or < 90; DBP > 90 or < 50; HR > 120 or < 50; Temp > 101; Other:         CONSULTS:    Physical Therapy to evaluate and treat. Evaluate for home safety and equipment needs; Establish/upgrade home exercise program. Perform / instruct on therapeutic exercises, gait training, transfer training, and Range of Motion.  Occupational Therapy to evaluate and treat. Evaluate home environment for safety and equipment needs. Perform/Instruct on transfers, ADL training, ROM, and therapeutic exercises.   to evaluate for community resources/long-range planning.    MISCELLANEOUS CARE:  N/A    WOUND CARE ORDERS  n/a      Medications: Review discharge medications with patient and family and provide education.      Current Discharge Medication List      START taking these medications    Details   ciprofloxacin HCl (CIPRO) 500 MG tablet Take 1 tablet (500 mg total) by  mouth once daily. for 13 doses  Qty: 13 tablet, Refills: 0    Associated Diagnoses: Acute cystitis without hematuria         CONTINUE these medications which have NOT CHANGED    Details   albuterol-ipratropium (DUO-NEB) 2.5 mg-0.5 mg/3 mL nebulizer solution Take 3 mLs by nebulization every 6 (six) hours as needed for Wheezing or Shortness of Breath. Rescue  Qty: 1 Box, Refills: 3      amitriptyline (ELAVIL) 50 MG tablet TAKE ONE TABLET BY MOUTH EVERY EVENING  Qty: 90 tablet, Refills: 12    Associated Diagnoses: Depression, unspecified depression type      amLODIPine (NORVASC) 5 MG tablet Take 1 tablet (5 mg total) by mouth once daily.  Qty: 30 tablet, Refills: 11    Comments: .  Associated Diagnoses: Essential hypertension      ammonium lactate (LAC-HYDRIN) 12 % lotion Apply topically as needed for Dry Skin.  Qty: 1 Bottle, Refills: 4      calcium carbonate (OS-UNIQUE) 500 mg calcium (1,250 mg) tablet Take 2 tablets (1,000 mg total) by mouth once daily.  Refills: 0    Associated Diagnoses: Malignant neoplasm of lower lobe of right lung      clindamycin phosphate 1% (CLINDAGEL) 1 % gel APPLY TOPICALLY TWICE DAILY; apply to back area  Qty: 60 g, Refills: 1    Comments: Large tube  Associated Diagnoses: Acneiform drug eruption      CREON CpDR TAKE ONE CAPSULE BY MOUTH THREE TIMES A DAY WITH MEALS  Qty: 270 capsule, Refills: 3      denosumab (XGEVA) 120 mg/1.7 mL (70 mg/mL) Soln Inject 120 mg into the skin every 28 days.      ELIQUIS 5 mg Tab TAKE ONE TABLET BY MOUTH TWICE DAILY  Qty: 60 tablet, Refills: 6    Associated Diagnoses: Other chronic pulmonary embolism without acute cor pulmonale      erlotinib (TARCEVA) 150 MG tablet 1 TAB      ketoconazole (NIZORAL) 2 % cream Apply topically once daily.  Qty: 1 Tube, Refills: 2      levETIRAcetam (KEPPRA) 500 MG Tab TAKE ONE TABLET BY MOUTH TWICE DAILY. no further refills without APPOINTMENT  Qty: 180 tablet, Refills: 1    Associated Diagnoses: Brain metastases      LORazepam  (ATIVAN) 1 MG tablet TAKE ONE TABLET (1mg) BY MOUTH TWICE DAILY  Qty: 60 tablet, Refills: 5      multivitamin (THERAGRAN) per tablet Take 1 tablet by mouth once daily.      MYRBETRIQ 50 mg Tb24 TAKE ONE TABLET BY MOUTH ONCE DAILY  Qty: 30 tablet, Refills: 11    Associated Diagnoses: OAB (overactive bladder)      polyethylene glycol (GLYCOLAX) 17 gram/dose powder       senna-docusate 8.6-50 mg (SENNA LAXATIVE-STOOL SOFTENER) 8.6-50 mg per tablet Take 1 tablet by mouth once daily.         STOP taking these medications       doxycycline (VIBRA-TABS) 100 MG tablet Comments:   Reason for Stopping:         mesalamine (CANASA) 1000 MG Supp Comments:   Reason for Stopping:         pantoprazole (PROTONIX) 40 MG tablet Comments:   Reason for Stopping:               I certify that this patient is confined to her home and needs intermittent skilled nursing care, physical therapy and occupational therapy.

## 2020-11-22 NOTE — TELEPHONE ENCOUNTER
10/21/2020 @ 8:50 am  Called patient today. She is on NP's schedule so called to schedule time for the visit.     No answer so left return call.    Will reschedule patient for another f/u visit. Message sent to  Mayi Bello.    Kiya Landin DNP, FNP-C  [email or web address]  [phone]

## 2020-11-30 NOTE — TELEPHONE ENCOUNTER
11/4/2020    Patient is on NP's schedule for a palliative care follow-up visit. Called patient to setup a time;however, states she is not feeling well today and would like to reschedule this appt.    Message sent to Mayi Bello to reschedule appt in next 2 weeks.    Pt rescheduled for 12/23    Kiya Landin DNP, FNP-C  [email or web address]  [phone]

## 2020-12-01 NOTE — PATIENT INSTRUCTIONS
Afatinib tablets  What is this medicine?  AFATINIB (a FA ti nib) is a medicine that targets proteins in cancer cells and stops the cancer cells from growing. It is used to treat non-small cell lung cancer.  How should I use this medicine?  Take by mouth with a glass of water. Follow the directions on the prescription label. Take this medicine on an empty stomach, at least 1 hour before or 2 hours after food. Do not take with food. Use exactly as directed. Take your medicine at regular intervals. Do not take your medicine more often than directed.  Talk to your pediatrician regarding the use of this medicine in children. Special care may be needed.  What side effects may I notice from receiving this medicine?  Side effects that you should report to your doctor or health care professional as soon as possible:  · allergic reactions like skin rash, itching or hives, swelling of the face, lips, or tongue  · bloody or black tarry stools  · eye irritation  · eye pain  · fever  · mouth sores  · problems related to breathing including shortness of breath or cough  · severe or persistent diarrhea, nausea, vomiting, or loss of appetite  · spitting up blood or brown material that looks like coffee grounds  · unusual bleeding or bruising  Side effects that usually do not require medical attention (Report these to your doctor or health care professional if they continue or are bothersome.):  · acne  · diarrhea  · dry skin  · itching  · loss of appetite  · nausea  · vomiting  · weak or tired  · weight loss  What may interact with this medicine?  · amiodarone  · carbamazepine  · certain medicines for fungal infections like ketoconazole and itraconazole  · cyclosporine A  · erythromycin  · grapefruit juice  · nelfinavir  · phenobarbital  · phenytoin  · quinidine  · rifampicin  · ritonavir  · saquinavir  · [place]'s wort  · tacrolimus  · verapamil  What if I miss a dose?  Take your missed dose as soon as you remember. If your next  dose is to be taken in less than 12 hours, then do not take the missed dose. Take the next dose at your regular time. Do not take double or extra doses.  Where should I keep my medicine?  Keep out of the reach of children.  Store between 20 and 25 degrees C (68 and 77 degrees F). Throw away any unused medicine after the expiration date.  What should I tell my health care provider before I take this medicine?  They need to know if you have any of these conditions:  · eye disease, vision problems, or if you wear contact lenses  · heart disease  · kidney disease  · liver disease  · lung or breathing disease  · an unusual or allergic reaction to afatinib, other medicines, foods, dyes, or preservatives  · pregnant or trying to get pregnant  · breast-feeding  What should I watch for while using this medicine?  Visit your doctor for regular check ups. Report any side effects. Continue your course of treatment unless your doctor tells you to stop. You will need blood work done while you are taking this medicine.  If you experience any of the following, contact your health care provider: eye irritation; rash; severe or continuing diarrhea, nausea, decreased appetite, or vomiting; or if your breathing gets worse or you develop shortness of breath or cough.  Do not become pregnant while taking this medicine or for 2 weeks after stopping it. Women should inform their doctor if they wish to become pregnant or think they might be pregnant. There is a potential for serious side effects to an unborn child. Talk to your health care professional or pharmacist for more information. Do not breast-feed an infant while taking this medicine or for 2 weeks after the last dose.  NOTE:This sheet is a summary. It may not cover all possible information. If you have questions about this medicine, talk to your doctor, pharmacist, or health care provider. Copyright© 2017 [employer]

## 2020-12-01 NOTE — PROGRESS NOTES
"Subjective:       Patient ID: Naty tS is a 75 y.o. female.    Chief Complaint: Malignant neoplasm of lower lobe of right lung  Oncologic History:  Ms. Naty St was admitted to the hospital between 01/25/2016 and 01/28/2016. She has a history of pancreatic cancer 17 years ago, breast cancer and meningioma, presented to the hospital complaining of loss of consciousness. The patient apparently was in her normal state of health and she stood up to go to the bathroom and fell to the floor. The patient's daughter helped the mother up in the bathroom and noted that her mother's upper extremities were shaking and eye rolling. No reports of bowel or bladder incontinence, tongue biting or rolling. The second episode lasted about four minutes and she was extremely lethargic following that. Apparently, the patient had a meningioma resection done in the past; however, recently, underwent neurosurgical resection with Dr. 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    Additional immunohistochemical stains were performed which revealed the tumor cells to be are positive for TTF1 and negative for ER and GCDFP. The morphology and TTF1 positivity are most consistent with lung primary.    Of note, imaging scan at the end of January 2016 revealed a mass in the lung at 2 cm in the medial aspect of the apical segment of the right upper lobe abutting the mediastinum at the level of the azygous vein and abutting and possibly encasing the segmental bronchi and vessels of the apical segment of the right upper lobe and no pleural fluid was present. Also, there is an enlarged right paratracheal lymph node. No evidence of any metastatic disease at the pancreatic site with postoperative changes post Whipple disease  Her PET Scan from 2/15/16 reveal "Hypermetabolic mass in the right lung apex consistent with a " "primary malignancy. Hypermetabolic mediastinal lymph nodes consistent with metastatic disease. Right sacral hypermetabolic lesion consistent with metastatic disease, noting additional mildly sclerotic lesions in multiple vertebral bodies which do not demonstrate abnormal hypermetabolism  She underwent IR bone biopsy which revealed metastatic adenocarcinoma of lung origin. 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  She has completed focal RT to brain lesions in March 2016.    PET scan from 6/6/16 shows "Dramatic almost complete response to therapy."  Lovenox started after US lower ext 6/7/16 shows Acute complete occlusion of one of the left posterior tibial vein.Remote partial thrombus of the proximal left superficial femoral vein.  She is on Tarceva.   12/6/16 PET scan reveals "Stable right upper lobe lesion and right hilar lymph node. No new lesions identified. Trace left pleural effusion".  1/27/17 Renal u/s "Medical renal disease. Nonobstructive right nephrolithiasis"  1/31/17 PET - "Right upper lobe nodule and right hilar lymph node similar and very low grade activity.  There is no definite evidence of recurrence."   11/9/17 PET "In this patient with history of lung cancer, there is interval increase in size and hypermetabolism of a right upper lobe lung lesion concerning for recurrent disease. Additionally, there is interval appearance of a hypermetabolic paratracheal lymph node suspicious for metastatic disease"     She progressed on Tarceva She underwent EBUS on 12/5/17. Pathology revealed adenocarcinoma but T790m could not be done as quantity was not insufficient. Her PET scan from 1/30/18 revealed The patient's previously identified abnormal lesions is slightly greater uptake of FDG on today's study compared with prior exam. These findings are concerning for progression of disease"      She was hospitalized between 4/9/18-4/16/18. Her hospital course was as follows "[facility] "[facility] with acute hypoxic respiratory failure. She was requiring 4 L of nc on admission. She was started on moxi for CAP. She received one dose of ceftriaxone in the ED. On 2nd day of admission she spiked a fever. Her Hb was ~7 g/dl so she was transfused 1 unit of PRBCs. Over night she developed worsening of her symptoms with increased O2 requirements to 15 L HFNC. Her CXR showed worsening congestion. There was a concern of TRALI vs TACO. New 2D echo with diastolic dysfunction. She was given IV lasix pushes as needed and was started on dexamethasone.  Her abx was escalated to vanc and cefepime. She improved with diuresis and steroids. Pulmonary were consulted. Her O2 requirements continued to improve. On 4/15 she was switched to Augmentin. PT recommended discharging her to SNF but the patient refused and she wanted to go home with home health. She qualified for home oxygen so she was discharged on 3 L nc while at rest and 6 while active. Augmentin and dexamethasone were discontinued on discharge"     She was readmitted from 4/27 to 5/3/18 with who presented to the ED with a complaint of fatigue and worsening DELA CRUZ. Pt diagnosed PNA 4/9 and was discharged on 4/16 on 3L oxygen. She was initially placed on cefepime for 3 days followed by an add'l 2 days of Augmentin. Pulm was consulted with assistance as her oxygen requirements were increasing. She was placed on oral steroids, then trailed on 80mg TID solumedrol for 2 days and will be discharged on a prednisone taper. She was also diuresed with 2 days of 40mg IV lasix with appropriate UOP resulting, improvement on repeat CXR. She was medically stable and appropriate for DC home with home health on 1L continuous O2. She will be seen for close f/u and may be able to come off oxygen at that time.      She discontinued Tagrisso in April 2018. Restaging scans from 6/4/18 revealed "Stable appearance of a spiculated right upper lobe pulmonary lesion.  Additional irregular focus " "superior to the lesion in the right lung apex is stable and may represent scar or additional lesion; although this was not hypermetabolic on prior PET-CT.  No new pulmonary lesions. 1.3 cm subcarinal lymph node, not hypermetabolic on prior PET-CT. Stable postsurgical changes of a Whipple procedure. Bilateral nonobstructing nephrolithiasis.  Stable sclerotic focus in the T5 vertebral body, possibly a bone island as this was not hypermetabolic on prior PET-CT. Small hiatal hernia. RECIST SUMMARY: Date of prior examination for comparison 01/30/2018 Lesion 1: Right upper lobe 1.3 cm Series 2 image 31 prior measurement 1.3 cm"  Bone scan also from 6/4/18 revealed no evidence of mets.     Her PET scan from 7/11/18 revealed "Right suprahilar lesion SUV max 3.76, previously 6.86. Pretracheal lymph node SUV max 2.55, previously 3.79. There is physiologic intracranial, head, and neck activity.  There is muscle activation.  There is vocal cord activation.  There is physiologic liver, spleen, GI and  activity.  There is a hiatal hernia.  Pelvic organs show nothing unusual.  No bone lesions are seen.  There are areas of benign appearing lung scar"  She notes fatigue.  She denies any nausea, vomiting, diarrhea, constipation, abdominal pain, weight loss or loss of appetite, chest pain, shortness of breath, leg swelling, fatigue, pain, headache, dizziness, or mood changes. Her ECOG PS is 2. She is accompanied by her  and son     She has been on Tarceva since 6/5/18. She completed SBRT to her right lung lesions.       Restaging PET scan from 10/15/2020 reveals "In this patient with history of lung cancer, the previously described hypermetabolic right paratracheal and right hilar lesions appears stable in regards to metabolic activity compared to prior exam.  However, there has been interval increase in size and metabolic activity of additional pulmonary nodules scattered within both lungs, as further detailed above. " " Findings concerning for disease progression/metastases.zAdditional findings as above"  She was hospitalized from 10/15-10/17/2020 with renal failure, Tarceva was held.    Jose was again hospitalized between 11/16/2020-11/20/2020 with fever, vomiting, abdominal pain and dizziness. She was given zosyn and IVF with improvement. Source suspicious for either intra-abdominal or from UTI. CXR was not concerning for infectious process. Blood cultures x2 showing klebsiella. CT A/P did not find intra abdominal infection. Anemia to hgb of 6.8 requiring 1u prbc on 11/19. Klebsiella returned pan sensitive and she was discharged with 2 week supply of renally adjusted ciprofloxacin.   She has 2 more days of Cipro left.      Review of Systems   Constitutional: Negative for appetite change, fatigue and unexpected weight change.   HENT: Negative for mouth sores.    Eyes: Negative for visual disturbance.   Respiratory: Negative for cough and shortness of breath.    Cardiovascular: Negative for chest pain.   Gastrointestinal: Negative for abdominal pain and diarrhea.   Genitourinary: Negative for frequency.   Musculoskeletal: Negative for back pain.   Integumentary:  Negative for rash.   Neurological: Negative for headaches.   Hematological: Negative for adenopathy.   Psychiatric/Behavioral: The patient is not nervous/anxious.    All other systems reviewed and are negative.        Objective:      Physical Exam  Vitals signs reviewed.   Constitutional:       Appearance: She is well-developed.   HENT:      Mouth/Throat:      Pharynx: No oropharyngeal exudate.   Cardiovascular:      Rate and Rhythm: Normal rate.      Heart sounds: Normal heart sounds.   Pulmonary:      Effort: Pulmonary effort is normal.      Breath sounds: Normal breath sounds. No wheezing.   Abdominal:      General: Bowel sounds are normal.      Palpations: Abdomen is soft.      Tenderness: There is no abdominal tenderness.   Musculoskeletal:         General: No " tenderness.   Lymphadenopathy:      Cervical: No cervical adenopathy.   Skin:     General: Skin is warm and dry.      Findings: No rash.   Neurological:      Mental Status: She is alert and oriented to person, place, and time.      Coordination: Coordination normal.   Psychiatric:         Thought Content: Thought content normal.         Judgment: Judgment normal.           LABS:  WBC   Date Value Ref Range Status   12/01/2020 5.61 3.90 - 12.70 K/uL Final     Hemoglobin   Date Value Ref Range Status   12/01/2020 9.3 (L) 12.0 - 16.0 g/dL Final     POC Hematocrit   Date Value Ref Range Status   02/21/2020 30 (L) 36 - 54 %PCV Final     Hematocrit   Date Value Ref Range Status   12/01/2020 31.1 (L) 37.0 - 48.5 % Final     Platelets   Date Value Ref Range Status   12/01/2020 284 150 - 350 K/uL Final     Gran # (ANC)   Date Value Ref Range Status   12/01/2020 3.7 1.8 - 7.7 K/uL Final     Comment:     The ANC is based on a white cell differential from an   automated cell counter. It has not been microscopically   reviewed for the presence of abnormal cells. Clinical   correlation is required.         Chemistry        Component Value Date/Time     12/01/2020 1307    K 5.0 12/01/2020 1307     12/01/2020 1307    CO2 22 (L) 12/01/2020 1307    BUN 57 (H) 12/01/2020 1307    CREATININE 4.0 (H) 12/01/2020 1307    GLU 92 12/01/2020 1307        Component Value Date/Time    CALCIUM 8.8 12/01/2020 1307    ALKPHOS 176 (H) 12/01/2020 1307    AST 26 12/01/2020 1307    ALT 18 12/01/2020 1307    BILITOT 0.7 12/01/2020 1307    ESTGFRAFRICA 11.8 (A) 12/01/2020 1307    EGFRNONAA 10.3 (A) 12/01/2020 1307          Assessment:       1. Malignant neoplasm of lower lobe of right lung    2. 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 Chronic kidney disease (CKD) stage G4/A3, severely decreased glomerular filtration rate (GFR) between 15-29 mL/min/1.73 square meter and albuminuria  creatinine ratio greater than 300 mg/g        Plan:         1,2,3,4. Her creatinine is still at 4, Discontinued Tarceva. Can start Afatinib at a dose of 30 mg dose reduced due to crcl.  Will monitor labs weekly while on chemo  4. SHe will see Nephrology later this week.    Above care plan was discussed with patient and accompanying son and all questions were addressed to their satisfaction

## 2020-12-01 NOTE — Clinical Note
[facility], Spoke with Basil,   Once approved and she starts taking it. Will need to see me 1 week after with labs.

## 2020-12-07 NOTE — TELEPHONE ENCOUNTER
Spoke with patient.  Explained to her once she starts medication dr sullivan will see her one week later with labs.  She voiced understanding.

## 2020-12-07 NOTE — TELEPHONE ENCOUNTER
----- Message from Jd Goode sent at 12/7/2020  8:35 AM CST -----  Contact: @ [phone]  Patient requesting a return call about scheduling a f/u after 12-8th @ the [place] location ( system didn't allow scheduling ) pls call

## 2020-12-07 NOTE — TELEPHONE ENCOUNTER
"----- Message from Surya Cheatham sent at 12/7/2020  8:03 AM CST -----   Consult/Advisory:    Name Of Caller:Naty   Contact Preference?: [phone]    Does patient feel the need to be seen today? No     What is the nature of the call?:  -pt request a callback regarding approval for new medication & when will she be scheduled for next lab    Additional Notes:  "Thank you for all that you do for our patients'"       "

## 2020-12-07 NOTE — TELEPHONE ENCOUNTER
MA returned patient's call. Mrs. St was offered and accepted an appointment with Dr. Carmona on 1/12 at 2:00.

## 2020-12-08 NOTE — TELEPHONE ENCOUNTER
Unable to submit Gilotrif PA via Cover My Meds. Submitted verbal PA with representative Jeannine on 12/8 at 11:44 AM. PA-ID: [id number].

## 2020-12-08 NOTE — TELEPHONE ENCOUNTER
Call Attempt 1: Contacted patient for her Gilotrif initial consultation and to set up initial shipment - unable to reach with first attempt; LVM on patient's number. Contacted patient's son for consultation - he requested that I call patient again as he would prefer the consultation be completed with her. Attempted to reach the patient again for consultation - patient answered the phone this time, but requested a call back tomorrow for her consultation.

## 2020-12-08 NOTE — TELEPHONE ENCOUNTER
Gilotrif PA approved through 6/6/2021. Forwarded to BI queue to determine copay and whether financial assistance is needed.

## 2020-12-09 NOTE — TELEPHONE ENCOUNTER
Specialty Pharmacy - Initial Clinical Assessment    Specialty Medication Orders Linked to Encounter      Most Recent Value   Medication #1  afatinib (GILOTRIF) 30 mg Tab (Order#[id number], Rx#[id number])        Linda St is a 75 y.o. female, who is followed by the specialty pharmacy service for management and education.    Recent Encounters     Date Type Provider Description    12/08/2020 Specialty Pharmacy Cj Holt PharmD Initial Clinical Assessment    12/02/2020 Specialty Pharmacy Cj Holt PharmD Referral Authorization    10/27/2020 Specialty Pharmacy Cj Holt PharmD Clinical Intervention        Clinical call attempts since last clinical assessment   12/8/2020  9:44 PM - Specialty Pharmacy - Clinical Assessment by Cj Holt PharmD     Today she received education before her first fill with [facility].    Current Outpatient Medications   Medication Sig Note    afatinib (GILOTRIF) 30 mg Tab Take 30 mg by mouth once daily.     albuterol-ipratropium (DUO-NEB) 2.5 mg-0.5 mg/3 mL nebulizer solution Take 3 mLs by nebulization every 6 (six) hours as needed for Wheezing or Shortness of Breath. Rescue     amitriptyline (ELAVIL) 50 MG tablet TAKE ONE TABLET BY MOUTH EVERY EVENING     amLODIPine (NORVASC) 5 MG tablet Take 1 tablet (5 mg total) by mouth once daily.     ammonium lactate (LAC-HYDRIN) 12 % lotion Apply topically as needed for Dry Skin.     calcium carbonate (OS-UNIQUE) 500 mg calcium (1,250 mg) tablet Take 2 tablets (1,000 mg total) by mouth once daily.     clindamycin phosphate 1% (CLINDAGEL) 1 % gel APPLY TOPICALLY TWICE DAILY; apply to back area 12/9/2020: PRN    CREON CpDR TAKE ONE CAPSULE BY MOUTH THREE TIMES A DAY WITH MEALS     ELIQUIS 5 mg Tab TAKE ONE TABLET BY MOUTH TWICE DAILY     ketoconazole (NIZORAL) 2 % cream Apply topically once daily. 12/9/2020: PRN    levETIRAcetam (KEPPRA) 500 MG Tab TAKE ONE TABLET BY MOUTH TWICE  DAILY. no further refills without APPOINTMENT     LORazepam (ATIVAN) 1 MG tablet TAKE ONE TABLET (1mg) BY MOUTH TWICE DAILY     multivitamin (THERAGRAN) per tablet Take 1 tablet by mouth once daily.     MYRBETRIQ 50 mg Tb24 TAKE ONE TABLET BY MOUTH ONCE DAILY     polyethylene glycol (GLYCOLAX) 17 gram/dose powder     Last reviewed on 12/9/2020 10:03 AM by Cj Holt, PharmD    Review of patient's allergies indicates:   Allergen Reactions    Tobradex [tobramycin-dexamethasone] Swelling    Iodinated contrast media Hives    Latex Rash and Hives    Phenytoin sodium extended Other (See Comments) and Rash   Last reviewed on  12/9/2020 10:04 AM by Cj Holt    Drug Interactions    Drug interactions evaluated: yes  Clinically relevant drug interactions identified: no  Provided the patient with educational material regarding drug interactions: not applicable         Adverse Effects    *All other systems reviewed and are negative       Assessment Questions - Documented Responses      Most Recent Value   Assessment   Medication Reconciliation completed for patient  Yes   During the past 4 weeks, has patient missed any activities due to condition or medication?  No   During the past 4 weeks, did patient have any of the following urgent care visits?  None   Goals of Therapy Status  Discussed (new start)   Welcome packet contents reviewed and discussed with patient?  No [Patient declined as she is not new to [facility].]   Assesment completed?  Yes   Plan  Therapy being initiated   Do you need to open a clinical intervention (i-vent)?  No   Do you want to schedule first shipment?  Yes        Refill Questions - Documented Responses      Most Recent Value   Relationship to patient of person spoken to?  Self   HIPAA/medical authority confirmed?  Yes   Can the patient store medication/sharps container properly (at the correct temperature, away from children/pets, etc.)?  Yes   Can the patient call emergency services (911)  "in the event of an emergency?  Yes   Does the patient have any concerns or questions about taking or administering this medication as prescribed?  No   How many doses does the patient have on hand?  0   How many days does the patient report on hand quantity will last?  0   During the past 4 weeks, has patient missed any activities due to condition or medication?  No   During the past 4 weeks, did patient have any of the following urgent care visits?  None   How will the patient receive the medication?  Mail   When does the patient need to receive the medication?  12/11/20   Shipping Address  Home   Address in [facility] confirmed and updated if neccessary?  Yes   Expected Copay ($)  0   Is the patient able to afford the medication copay?  Yes   Payment Method  zero copay   Days supply of Refill  30   Refill activity completed?  Yes   Refill activity plan  Refill scheduled   Shipment/Pickup Date:  12/09/20        Objective    She has a past medical history of Anticoagulant long-term use, Blood clot in vein (06/2016), Breast cancer (1994), Cataract, Hypertension, Lung cancer, Lung cancer metastatic to brain, Pancreatic cancer (1998), Posterior capsular opacification, left eye, Psychiatric problem, Seizures (01/25/2016), Stroke, and Therapy.    Tried/failed medications: Tarceva    BP Readings from Last 4 Encounters:   12/04/20 (!) 140/80   12/01/20 (!) 141/69   11/20/20 136/67   11/10/20 (!) 111/55     Ht Readings from Last 4 Encounters:   12/04/20 5' 6" (1.676 m)   12/01/20 5' 6" (1.676 m)   11/17/20 5' 6" (1.676 m)   10/22/20 5' 6" (1.676 m)     Wt Readings from Last 4 Encounters:   12/04/20 63 kg (138 lb 14.2 oz)   12/01/20 62.6 kg (138 lb 0.1 oz)   11/20/20 64.4 kg (141 lb 15.6 oz)   10/22/20 61.2 kg (135 lb)     Recent Labs   Lab Result Units 12/01/20  1307 11/20/20  0355 11/19/20  0510 11/18/20  0351   RBC M/uL 3.19 L 2.80 L 2.39 L 2.66 L   Hemoglobin g/dL 9.3 L 8.1 L 6.8 L 7.7 L   Hematocrit % 31.1 L 26.4 " L 22.7 L 26.1 L   WBC K/uL 5.61 6.82 7.77 10.96   Gran # (ANC) K/uL 3.7 4.7 5.5 9.2 H   Gran % %  --  69.1 71.1 83.5 H   Platelets K/uL 284 233 237 204   Sodium mmol/L 138 143 142 141   Potassium mmol/L 5.0 3.9 3.9 4.6   Chloride mmol/L 106 119 H 119 H 119 H   Glucose mg/dL 92 85 90 74   BUN mg/dL 57 H 36 H 38 H 40 H   Creatinine mg/dL 4.0 H 3.7 H 3.6 H 3.6 H   Calcium mg/dL 8.8 7.3 L 7.3 L 7.9 L   Total Protein g/dL 7.1 5.4 L 5.1 L 5.7 L   Albumin g/dL 3.3 L 2.2 L 2.1 L 2.2 L   Total Bilirubin mg/dL 0.7 0.9 0.9 2.2 H   Alkaline Phosphatase U/L 176 H 208 H 255 H 352 H   AST U/L 26 34 60 H 145 H   ALT U/L 18 68 H 90 H 130 H     The goals of cancer treatment include:  · Achieving remission of cancer, if possible  · Reducing tumor size and spread of cancer, if remission is not possible  · Minimizing pain and symptoms of the cancer  · Preventing infection and other complications of treatment  · Promoting adequate nutrition  · Encouraging proper hydration  · Improving or maintaining quality of life  · Maintaining optimal therapy adherence  · Minimizing and managing side effects    Goals of Therapy Status: Discussed (new start)    Assessment/Plan  Patient plans to start therapy on 12/11/20    Indication, dosage, appropriateness, effectiveness, safety and convenience of her specialty medication(s) were reviewed today.     Patient Counseling    Counseled the patient on the following: doses and administration discussed, safe handling, storage, and disposal discussed, possible adverse effects and management discussed, possible drug and prescription drug interactions discussed, possible drug and OTC drug and food interactions discussed, lab monitoring and follow-up discussed, therapeutic rationale discussed, cost of medications and cost implications discussed, adherence and missed doses discussed, pharmacy contact information discussed       Initial Gilotrif consult completed on 12/9. Gilotrif will be shipped on 12/9 to arrive  at patient's home on 12/10 via [employer]. $ 0.00 copay. Patient plans to start Gilotrif on . Address confirmed, CC on file. Confirmed 2 patient identifiers - name and . Therapy Appropriate. Patient is being switched from Tarceva due to elevated creatinine levels. Discussed proper disposal of Tarceva - do NOT throw away in the regular trash. Provided patient with a pharmacy able to take back to medication for disposal. Patient will bring medication along with her to MD office to see if they can dispose of it for her; if they cannot, she will bring to the provided pharmacy.     Patient declined a review of pharmacy information as she is NOT new to [facility].    Reviewed proper handling and storage. Patient was instructed to store her medication at room temperature in a cool, dry place. Advised patient to avoid storing in the bathroom due to exposure to moisture and anywhere near direct heat (fireplace, stove, windowsill). Patient should not touch medication directly - recommended using gloves or shaking into cap of bottle/dosing cup. If patient comes into contact with medication, she was instructed to wash hands thoroughly afterwards to prevent transfer of residue to other surfaces and other individuals. Patient was also instructed to keep toilet seat down when flushing and to flush twice.    Counseled patient to take Gilotrif 30 mg - 1 tablet by mouth once daily on an empty stomach (1 hour before or 2 hours after meals). Take around the same time each day. If a dose is missed, patient may take if within the normal scheduled time. Patient aware to NOT double up on doses and try to remain consistent with the time of the day she takes her medication to maintain consistent levels of drug in the body. Do not chew, crush, or break the tablets.     Labs reviewed from 2020. CBC - stable. CMP - stable except creatinine remains elevated and above baseline. Hepatic function is stable. Patient's Estimated CrCl is 11 mL/min  based on creatinine of 4.0 mg/dL on 12/1/2020. Patient's EGFR is 11.8 mL/minute/1.73 m2. Recommendations per PI are as follows: eGFR 15 to 29 mL/minute/1.73 m2: Reduce starting dose to 30 mg once daily. eGFR <15 mL/minute/1.73 m2 and hemodialysis: There are no dosage adjustments provided in the 's labeling (has not been studied). Provider was messaged regarding the appropriateness of Gilotrif therapy in this patient given her reduced renal function. Informed her that patients with an eGFR that low have not been studied, so there is no information as to whether it is appropriate for patient to proceed with therapy and at what dose. MD states that she reduced dose because of renal function and plans to monitor patient's labs closely (weekly). If patient's creatinine increase, she will discontinue therapy. Therapy is appropriate for treatment of non-small cell lung cancer (NSCLC), metastatic, with nonresistant EGFR mutations. Patient has an EGFR exon 19 mutation.    Patient counseled on common SEs: fatigue (monitor for changes in energy levels); dry skin (eucerin cream provided); itching (OTC Benadryl); acne/pimples; rash (hydrocortisone cream provided for red, itchy bumps - do NOT apply to open cuts/wounds or broken skin); diarrhea (stay well hydrated and OTC Imodium - use as directed; if still >4 loose stools/day, notify [facility] due to risk for dehydration); N/V (patient would like to monitor for symptoms for now; if anti-emetic is needed, contact [facility] to reach out to MD for Rx); loss of appetite (monitor for changes); mouth sores (home remedy - swish and spit 1 cup warm water + 1/2 tsp salt + 1/2 tsp baking soda); epistaxis/increased risk of bleeding (monitor for abnormal bruising/bleeding - blood in urine/stools and nosebleeds); increased risk for infection (monitor for fever, achy chills, or wet persistent cough).    Appetite: Eats 3 meals/day and snacks in between.  Energy: Able to complete daily  activities, but does not exercise. Patient is planning to start physical therrapy next week. Discussed importing moderate intensity physical activity/exercise with a goal of 150 minutes/week. Patient was advised to start small and increase as tolerated to the goal.  Pain: 0/10 - denies pain.    Patient has a great understanding of everything discussed. All questions were answered to her satisfaction. She is aware to contact [facility] if she needs anything.    Tasks added this encounter   1/1/2021 - Refill Call (Auto Added)  3/2/2021 - Clinical - Follow Up Assesement (90 day)  12/18/2020 - 7 Day Post Start Touchbase   Tasks due within next 3 months   No tasks due.     Cj Holt, PharmD  [facility]  [address] [place] [id number]  Phone: [phone]  Fax: [phone]

## 2020-12-09 NOTE — TELEPHONE ENCOUNTER
Call Attempt 2: Unable to reach patient for her Gilotrif initial consult - LVM requesting call back to discuss medication and set up her initial shipment.

## 2020-12-09 NOTE — PROGRESS NOTES
Subjective:       Patient ID: Naty St is a 75 y.o. Black or  female who presents for re-evaluation of progression of Chronic Kidney Disease    HPI Ms. St is a 75 year old woman medical history of hypertension, metastatic R lung adenocarcinoma on Xgeva and Tarceva, breast ca s/p L lumpectomy, meningioma s/p resection, pancreatic ca s/p whipple, and s/p hysterectomy w/ b/l salpingo-oophorectomy, nephrolithiasis requiring stent in 2012 presenting for follow up of chronic kidney disease. Patient creatinine has been relatively stable since April 2019 (had been 1.6-1.9mg/dL), elevated since admission January 2017 (occasionally down to 1.2-1.4gmg/dL), though recently elevated since admission October 2020.  She reports moderate daily fluid intake, denies any NSAID use.  She reports blood pressure usually well-controlled.  She reports occasional lower extremity swelling, otherwise denies any fever, chest pain, shortness of breath, abdominal pain, diarrhea, dysuria/hematuria.     Review of Systems   Constitutional: Negative for appetite change, fatigue and fever.   Respiratory: Negative for chest tightness and shortness of breath.    Cardiovascular: Positive for leg swelling. Negative for chest pain.   Gastrointestinal: Negative for abdominal pain, constipation, diarrhea, nausea and vomiting.   Genitourinary: Negative for difficulty urinating, dysuria, flank pain, frequency, hematuria and urgency.   Musculoskeletal: Negative for arthralgias, joint swelling and myalgias.   Skin: Negative for rash and wound.   Neurological: Negative for dizziness, weakness and light-headedness.   All other systems reviewed and are negative.      Objective:      Physical Exam   Constitutional: She appears well-developed and well-nourished.   Pulmonary/Chest: Effort normal. No respiratory distress.   Musculoskeletal: She exhibits edema (trace bilateral lower extremity).   Neurological: She is alert.     Psychiatric: She has a normal mood and affect.   Vitals reviewed.      Assessment:       1. Stage 3b chronic kidney disease    2. Acute kidney injury (nontraumatic)        Plan:     Ms. St is a 75 year old woman medical history of hypertension, metastatic R lung adenocarcinoma on Xgeva and Tarceva, breast ca s/p L lumpectomy, meningioma s/p resection, pancreatic ca s/p whipple, and s/p hysterectomy w/ b/l salpingo-oophorectomy, nephrolithiasis requiring stent in 2012 presenting for follow up of chronic kidney disease stage IIIb/IV.  Suspect CKD stage IIIb due to age-related renal nephron loss, along with atherosclerotic/hypertensive disease, in addition to prior erlotinib (Tarceva) use (discontinued with recent acute kidney injury.   Patient creatinine has been relatively stable since April 2019 (has been 1.6-1.9mg/dL), elevated since admission January 2017 (occasionally down to 1.2-1.4gmg/dL),  though recently elevated since admission October 2020.  Creatinine up to 4.8mg/dL, improved since (starr 3.4mg/dL) though remains elevated, last 4.0mg/dL) will continue to trend (along with proteinuria, which has been negligible on ARB).  Patient with progressive chronic kidney disease v. acute kidney injury, discussed signs/symptoms of uremia, along with indications for renal replacement therapy; will have patient attend [facility] education on dialysis modalities. Stressed importance of blood pressure control to prevent any further progression of kidney disease, patient voiced understanding.  Further recommendations pending results of above.    - Anemia: Hgb below goal, though improving, management of erythropoetin therapy per Hematology/Oncology given extensive history  - Bone/mineral metabolism: patient with secondary hyperparathyroidism, will trend PTH/VitD  - Nephrolithiasis: encouraged fluid intake and sodium restriction    Return to clinic in 6-8 weeks pending renal panel within 2-4 weeks, then with renal/heme panel,  iron/TIBC/ferritin, urinalysis/culture, urine protein/creatinine ratio, PTH/VitD prior to next visit

## 2020-12-10 PROBLEM — I70.0 AORTIC ATHEROSCLEROSIS: Status: ACTIVE | Noted: 2020-01-01

## 2020-12-10 PROBLEM — J70.1 CHRONIC AND OTHER PULMONARY MANIFESTATIONS DUE TO RADIATION: Status: ACTIVE | Noted: 2020-01-01

## 2020-12-10 NOTE — PROGRESS NOTES
"Subjective:       Patient ID: Naty St is a 75 y.o. female.    Chief Complaint: Malignant neoplasm of lower lobe of right lung  Oncologic History:  Ms. Naty St was admitted to the hospital between 01/25/2016 and 01/28/2016. She has a history of pancreatic cancer 17 years ago, breast cancer and meningioma, presented to the hospital complaining of loss of consciousness. The patient apparently was in her normal state of health and she stood up to go to the bathroom and fell to the floor. The patient's daughter helped the mother up in the bathroom and noted that her mother's upper extremities were shaking and eye rolling. No reports of bowel or bladder incontinence, tongue biting or rolling. The second episode lasted about four minutes and she was extremely lethargic following that. Apparently, the patient had a meningioma resection done in the past; however, recently, underwent neurosurgical resection with Dr. 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    Additional immunohistochemical stains were performed which revealed the tumor cells to be are positive for TTF1 and negative for ER and GCDFP. The morphology and TTF1 positivity are most consistent with lung primary.    Of note, imaging scan at the end of January 2016 revealed a mass in the lung at 2 cm in the medial aspect of the apical segment of the right upper lobe abutting the mediastinum at the level of the azygous vein and abutting and possibly encasing the segmental bronchi and vessels of the apical segment of the right upper lobe and no pleural fluid was present. Also, there is an enlarged right paratracheal lymph node. No evidence of any metastatic disease at the pancreatic site with postoperative changes post Whipple disease  Her PET Scan from 2/15/16 reveal "Hypermetabolic mass in the right lung apex consistent with a " "primary malignancy. Hypermetabolic mediastinal lymph nodes consistent with metastatic disease. Right sacral hypermetabolic lesion consistent with metastatic disease, noting additional mildly sclerotic lesions in multiple vertebral bodies which do not demonstrate abnormal hypermetabolism  She underwent IR bone biopsy which revealed metastatic adenocarcinoma of lung origin. 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  She has completed focal RT to brain lesions in March 2016.    PET scan from 6/6/16 shows "Dramatic almost complete response to therapy."  Lovenox started after US lower ext 6/7/16 shows Acute complete occlusion of one of the left posterior tibial vein.Remote partial thrombus of the proximal left superficial femoral vein.  She is on Tarceva.   12/6/16 PET scan reveals "Stable right upper lobe lesion and right hilar lymph node. No new lesions identified. Trace left pleural effusion".  1/27/17 Renal u/s "Medical renal disease. Nonobstructive right nephrolithiasis"  1/31/17 PET - "Right upper lobe nodule and right hilar lymph node similar and very low grade activity.  There is no definite evidence of recurrence."   11/9/17 PET "In this patient with history of lung cancer, there is interval increase in size and hypermetabolism of a right upper lobe lung lesion concerning for recurrent disease. Additionally, there is interval appearance of a hypermetabolic paratracheal lymph node suspicious for metastatic disease"     She progressed on Tarceva She underwent EBUS on 12/5/17. Pathology revealed adenocarcinoma but T790m could not be done as quantity was not insufficient. Her PET scan from 1/30/18 revealed The patient's previously identified abnormal lesions is slightly greater uptake of FDG on today's study compared with prior exam. These findings are concerning for progression of disease"      She was hospitalized between 4/9/18-4/16/18. Her hospital course was as follows "[facility] "[facility] with acute hypoxic respiratory failure. She was requiring 4 L of nc on admission. She was started on moxi for CAP. She received one dose of ceftriaxone in the ED. On 2nd day of admission she spiked a fever. Her Hb was ~7 g/dl so she was transfused 1 unit of PRBCs. Over night she developed worsening of her symptoms with increased O2 requirements to 15 L HFNC. Her CXR showed worsening congestion. There was a concern of TRALI vs TACO. New 2D echo with diastolic dysfunction. She was given IV lasix pushes as needed and was started on dexamethasone.  Her abx was escalated to vanc and cefepime. She improved with diuresis and steroids. Pulmonary were consulted. Her O2 requirements continued to improve. On 4/15 she was switched to Augmentin. PT recommended discharging her to SNF but the patient refused and she wanted to go home with home health. She qualified for home oxygen so she was discharged on 3 L nc while at rest and 6 while active. Augmentin and dexamethasone were discontinued on discharge"     She was readmitted from 4/27 to 5/3/18 with who presented to the ED with a complaint of fatigue and worsening DELA CRUZ. Pt diagnosed PNA 4/9 and was discharged on 4/16 on 3L oxygen. She was initially placed on cefepime for 3 days followed by an add'l 2 days of Augmentin. Pulm was consulted with assistance as her oxygen requirements were increasing. She was placed on oral steroids, then trailed on 80mg TID solumedrol for 2 days and will be discharged on a prednisone taper. She was also diuresed with 2 days of 40mg IV lasix with appropriate UOP resulting, improvement on repeat CXR. She was medically stable and appropriate for DC home with home health on 1L continuous O2. She will be seen for close f/u and may be able to come off oxygen at that time.      She discontinued Tagrisso in April 2018. Restaging scans from 6/4/18 revealed "Stable appearance of a spiculated right upper lobe pulmonary lesion.  Additional irregular focus " "superior to the lesion in the right lung apex is stable and may represent scar or additional lesion; although this was not hypermetabolic on prior PET-CT.  No new pulmonary lesions. 1.3 cm subcarinal lymph node, not hypermetabolic on prior PET-CT. Stable postsurgical changes of a Whipple procedure. Bilateral nonobstructing nephrolithiasis.  Stable sclerotic focus in the T5 vertebral body, possibly a bone island as this was not hypermetabolic on prior PET-CT. Small hiatal hernia. RECIST SUMMARY: Date of prior examination for comparison 01/30/2018 Lesion 1: Right upper lobe 1.3 cm Series 2 image 31 prior measurement 1.3 cm"  Bone scan also from 6/4/18 revealed no evidence of mets.     Her PET scan from 7/11/18 revealed "Right suprahilar lesion SUV max 3.76, previously 6.86. Pretracheal lymph node SUV max 2.55, previously 3.79. There is physiologic intracranial, head, and neck activity.  There is muscle activation.  There is vocal cord activation.  There is physiologic liver, spleen, GI and  activity.  There is a hiatal hernia.  Pelvic organs show nothing unusual.  No bone lesions are seen.  There are areas of benign appearing lung scar"  She notes fatigue.  She denies any nausea, vomiting, diarrhea, constipation, abdominal pain, weight loss or loss of appetite, chest pain, shortness of breath, leg swelling, fatigue, pain, headache, dizziness, or mood changes. Her ECOG PS is 2. She is accompanied by her  and son     She has been on Tarceva since 6/5/18. She completed SBRT to her right lung lesions.       Restaging PET scan from 10/15/2020 reveals "In this patient with history of lung cancer, the previously described hypermetabolic right paratracheal and right hilar lesions appears stable in regards to metabolic activity compared to prior exam.  However, there has been interval increase in size and metabolic activity of additional pulmonary nodules scattered within both lungs, as further detailed above. " " Findings concerning for disease progression/metastases.zAdditional findings as above"  She was hospitalized from 10/15-10/17/2020 with renal failure, Tarceva was held.     She was again hospitalized between 11/16/2020-11/20/2020 with fever, vomiting, abdominal pain and dizziness. She was given zosyn and IVF with improvement. Source suspicious for either intra-abdominal or from UTI. CXR was not concerning for infectious process. Blood cultures x2 showing klebsiella. CT A/P did not find intra abdominal infection. Anemia to hgb of 6.8 requiring 1u prbc on 11/19. Klebsiella returned pan sensitive and she was discharged with 2 week supply of renally adjusted ciprofloxacin which she completed./    HPI She comes in to review her GUARDANT results which reveal T790M   She comes in to discuss further. She is accompanied by her son. ECOG PS is 2 at best      Review of Systems   Constitutional: Positive for fatigue. Negative for appetite change and unexpected weight change.   HENT: Negative for mouth sores.    Eyes: Negative for visual disturbance.   Respiratory: Negative for cough and shortness of breath.    Cardiovascular: Negative for chest pain.   Gastrointestinal: Negative for abdominal pain and diarrhea.   Genitourinary: Negative for frequency.   Musculoskeletal: Negative for back pain.   Integumentary:  Negative for rash.   Neurological: Negative for headaches.   Hematological: Negative for adenopathy.   Psychiatric/Behavioral: The patient is not nervous/anxious.    All other systems reviewed and are negative.        Objective:      Physical Exam      LABS:  WBC   Date Value Ref Range Status   12/01/2020 5.61 3.90 - 12.70 K/uL Final     Hemoglobin   Date Value Ref Range Status   12/01/2020 9.3 (L) 12.0 - 16.0 g/dL Final     POC Hematocrit   Date Value Ref Range Status   02/21/2020 30 (L) 36 - 54 %PCV Final     Hematocrit   Date Value Ref Range Status   12/01/2020 31.1 (L) 37.0 - 48.5 % Final     Platelets   Date Value " Ref Range Status   12/01/2020 284 150 - 350 K/uL Final     Gran # (ANC)   Date Value Ref Range Status   12/01/2020 3.7 1.8 - 7.7 K/uL Final     Comment:     The ANC is based on a white cell differential from an   automated cell counter. It has not been microscopically   reviewed for the presence of abnormal cells. Clinical   correlation is required.         Chemistry        Component Value Date/Time     12/01/2020 1307    K 5.0 12/01/2020 1307     12/01/2020 1307    CO2 22 (L) 12/01/2020 1307    BUN 57 (H) 12/01/2020 1307    CREATININE 4.0 (H) 12/01/2020 1307    GLU 92 12/01/2020 1307        Component Value Date/Time    CALCIUM 8.8 12/01/2020 1307    ALKPHOS 176 (H) 12/01/2020 1307    AST 26 12/01/2020 1307    ALT 18 12/01/2020 1307    BILITOT 0.7 12/01/2020 1307    ESTGFRAFRICA 11.8 (A) 12/01/2020 1307    EGFRNONAA 10.3 (A) 12/01/2020 1307          Assessment:       1. Malignant neoplasm of lower lobe of right lung    2. 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 Chronic kidney disease (CKD) stage G4/A3, severely decreased glomerular filtration rate (GFR) between 15-29 mL/min/1.73 square meter and albuminuria creatinine ratio greater than 300 mg/g    5. 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 Aortic atherosclerosis        Plan:        1,2,3. Reviewed results of GUARDANT, she has EGFR T790M and the only treatment option is Tagrisso.   She had pneumonitis in April 2018 and she was on tagrisso at that time. So it is concerning to retry. She has recovered completely from the pneumonitis. Literature review reveals isolated case reports of successful rechallenge with Tagrisso   Discussed the pros and cons with the patient and she is agreeable. Will plan on starting at a dose of Tagrisso 40 mg and if she tolerates that dose for 2 months will go to full dose of 80 mg. Patient understands the risk and is willing to proceed. Will see  her weekly in the next 8 weeks   No dose adjustment needed with her CKD for Tagrisso  4. Monitor closely. Is following with nephrology  5. Stable  6. Stable on meds    Above care plan was discussed with patient and accompanying son and all questions were addressed to their satisfaction

## 2020-12-10 NOTE — TELEPHONE ENCOUNTER
Per Zandra Matt RN, patient will NOT be proceeding with Gilotrif therapy at this time as they are changing her treatment plan. She states patient has been notified and returned to clinic today to discuss further. Patient will now be proceeding with Tagrisso. MD is sending over new script. Will close out activities for Gilotrif at this time.

## 2020-12-10 NOTE — Clinical Note
Sent tagrisso to [facility]. Once she starts taking it I need to see her in 1 week after she starts

## 2020-12-11 NOTE — TELEPHONE ENCOUNTER
"----- Message from Dorinda Giles sent at 12/11/2020 10:31 AM CST -----  Patient Assist    Name of caller:  Basil / Son   Contact Preference:  [phone]  Does Patient feel the need to see the MD today? no  Physician: George VALENTINO MD   What is the nature of the call?    -  please call and speak with Son regarding current condition.     Additional Notes:   "Thank you for all that you do for our patients'"          "

## 2020-12-11 NOTE — TELEPHONE ENCOUNTER
Spoke with patient's son. He is calling to state he will bring the afatanib to the next clinic visit, and nurse will dispose of the medication for them.  He thanked nurse.

## 2020-12-11 NOTE — TELEPHONE ENCOUNTER
Patient's son contacted [facility] stating they received the shipment for Gilotrif and that the patient will now be proceeding with Tagrisso. Informed son that unfortunately the Gilotrif had already been shipped before we were aware of the change in therapy and that we weren't able to take the medication back once it's left the pharmacy. He expressed concern with insurance giving them trouble with the new medication, Tagrisso as the Gilotrif was just dispensed. Informed him that we will try our best to starting working on the new medication and submit the PA as soon as we're able too - could be a few days. Son appreciative and verbalized understanding. He states he will talk to patient's oncologist to see if they could use the medication for another patient as he does not want to throw away an unopened bottle. He has no further questions or concerns at this time. He is aware to contact [facility] if he needs anything.

## 2020-12-14 NOTE — TELEPHONE ENCOUNTER
MD office confirmed they had not submitted the PA for Tagrisso. Contacted patient's insurance to submit verbal PA. PA submitted on 12/14 at 9:30 AM. Spoke to representative, Soniya. Reference ID: [id number].

## 2020-12-14 NOTE — TELEPHONE ENCOUNTER
"Unable to submit Tagrisso PA via Cover My Meds on 12/13 at 8 PM. Received the following rejection: "There is an existing case within the [facility] environment that has the same patient, prescriber, and drug. This case must be finalized before proceeding with similar requests." Messaged MD office to determine whether they have already submitted the PA for the patient. If not, informed them that I could reach out to the patient's insurance to submit a verbal PA.    "

## 2020-12-14 NOTE — TELEPHONE ENCOUNTER
PA approved through 6/12/2021. Forwarding to BI queue to determine copay and whether financial assistance is needed.

## 2020-12-17 NOTE — TELEPHONE ENCOUNTER
Specialty Pharmacy - Initial Clinical Assessment    Specialty Medication Orders Linked to Encounter      Most Recent Value   Medication #1  osimertinib (TAGRISSO) 40 mg Tab (Order#[id number], Rx#[id number])        Linda St is a 75 y.o. female, who is followed by the specialty pharmacy service for management and education.    Recent Encounters     Date Type Provider Description    12/17/2020 Specialty Pharmacy Cj Holt PharmD Initial Clinical Assessment    12/10/2020 Specialty Pharmacy Cj Holt PharmD Referral Authorization    12/08/2020 Specialty Pharmacy Cj Holt PharmD Initial Clinical Assessment    12/02/2020 Specialty Pharmacy Cj Holt PharmD Referral Authorization    10/27/2020 Specialty Pharmacy Cj Holt PharmD Clinical Intervention        Clinical call attempts since last clinical assessment   12/8/2020  9:44 PM - Specialty Pharmacy - Clinical Assessment by Cj Holt PharmD  12/9/2020  2:36 PM - Specialty Pharmacy - Clinical Assessment by Cj Holt PharmD  12/15/2020  7:28 PM - Specialty Pharmacy - Clinical Assessment by Cj Holt PharmD     Today she received education before her first fill with [facility].    Current Outpatient Medications   Medication Sig    albuterol-ipratropium (DUO-NEB) 2.5 mg-0.5 mg/3 mL nebulizer solution Take 3 mLs by nebulization every 6 (six) hours as needed for Wheezing or Shortness of Breath. Rescue    amitriptyline (ELAVIL) 50 MG tablet TAKE ONE TABLET BY MOUTH EVERY EVENING    amLODIPine (NORVASC) 5 MG tablet Take 1 tablet (5 mg total) by mouth once daily.    ammonium lactate (LAC-HYDRIN) 12 % lotion Apply topically as needed for Dry Skin.    calcium carbonate (OS-NUIQUE) 500 mg calcium (1,250 mg) tablet Take 2 tablets (1,000 mg total) by mouth once daily.    clindamycin phosphate 1% (CLINDAGEL) 1 % gel APPLY TOPICALLY TWICE DAILY; apply to back area    CREON CpDR TAKE  ONE CAPSULE BY MOUTH THREE TIMES A DAY WITH MEALS    ELIQUIS 5 mg Tab TAKE ONE TABLET BY MOUTH TWICE DAILY    ketoconazole (NIZORAL) 2 % cream Apply topically once daily.    levETIRAcetam (KEPPRA) 500 MG Tab TAKE ONE TABLET BY MOUTH TWICE DAILY. no further refills without APPOINTMENT    LORazepam (ATIVAN) 1 MG tablet TAKE ONE TABLET (1mg) BY MOUTH TWICE DAILY    multivitamin (THERAGRAN) per tablet Take 1 tablet by mouth once daily.    MYRBETRIQ 50 mg Tb24 TAKE ONE TABLET BY MOUTH ONCE DAILY    osimertinib (TAGRISSO) 40 mg Tab Take 40 mg by mouth once daily.    polyethylene glycol (GLYCOLAX) 17 gram/dose powder    Last reviewed on 2020 10:03 AM by Cj Holt, PharmD    Review of patient's allergies indicates:   Allergen Reactions    Tobradex [tobramycin-dexamethasone] Swelling    Iodinated contrast media Hives    Latex Rash and Hives    Phenytoin sodium extended Other (See Comments) and Rash   Last reviewed on  2020 4:36 PM by Cj Holt    Drug Interactions    Drug interactions evaluated: yes  Clinically relevant drug interactions identified: yes   Interactions list: 1) Albuterol and amitriptyline may enhance the QTc-prolonging effect of Tagrisso (Cat B).   2) Tagrisso may increase the serum concentration of Eliquis (Cat B).    Drug management plan: 1) No action is required for the majority of patients. Increased ECG monitoring may be considered in patients at high risk for QT interval prolongation (eg, older age, female sex, bradycardia, hypokalemia, hypomagnesemia, heart disease, and higher drug concentrations).  2) No action is typically needed when a P-glycoprotein (P-gp) inhibitor is administered with apixaban except in patients also receiving a strong CY inhibitor.    Provided the patient with educational material regarding drug interactions: not applicable         Adverse Effects    *All other systems reviewed and are negative       Assessment Questions - Documented  Responses      Most Recent Value   Assessment   Medication Reconciliation completed for patient  Yes   During the past 4 weeks, has patient missed any activities due to condition or medication?  No   During the past 4 weeks, did patient have any of the following urgent care visits?  None   Goals of Therapy Status  Discussed (new start)   Welcome packet contents reviewed and discussed with patient?  -- [N/A - patient is NOT new to [facility]]   Assesment completed?  Yes   Plan  Therapy being initiated   Do you need to open a clinical intervention (i-vent)?  No   Do you want to schedule first shipment?  Yes        Refill Questions - Documented Responses      Most Recent Value   Relationship to patient of person spoken to?  Self   HIPAA/medical authority confirmed?  Yes   Can the patient store medication/sharps container properly (at the correct temperature, away from children/pets, etc.)?  Yes   Can the patient call emergency services (911) in the event of an emergency?  Yes   Does the patient have any concerns or questions about taking or administering this medication as prescribed?  No   How many doses does the patient have on hand?  0   How many days does the patient report on hand quantity will last?  0   During the past 4 weeks, has patient missed any activities due to condition or medication?  No   During the past 4 weeks, did patient have any of the following urgent care visits?  None   How will the patient receive the medication?  Mail   When does the patient need to receive the medication?  12/19/20   Shipping Address  Home   Address in [facility] confirmed and updated if neccessary?  Yes   Expected Copay ($)  0   Is the patient able to afford the medication copay?  Yes   Payment Method  zero copay   Days supply of Refill  30   Refill activity completed?  Yes   Refill activity plan  Refill scheduled   Shipment/Pickup Date:  12/17/20        Objective    She has a past medical history of Anticoagulant long-term  "use, Blood clot in vein (06/2016), Breast cancer (1994), Cataract, Hypertension, Lung cancer, Lung cancer metastatic to brain, Pancreatic cancer (1998), Posterior capsular opacification, left eye, Psychiatric problem, Seizures (01/25/2016), Stroke, and Therapy.    Tried/failed medications: Tarceva    BP Readings from Last 4 Encounters:   12/10/20 133/67   12/04/20 (!) 140/80   12/01/20 (!) 141/69   11/20/20 136/67     Ht Readings from Last 4 Encounters:   12/10/20 5' 6" (1.676 m)   12/04/20 5' 6" (1.676 m)   12/01/20 5' 6" (1.676 m)   11/17/20 5' 6" (1.676 m)     Wt Readings from Last 4 Encounters:   12/10/20 58.7 kg (129 lb 6.4 oz)   12/04/20 63 kg (138 lb 14.2 oz)   12/01/20 62.6 kg (138 lb 0.1 oz)   11/20/20 64.4 kg (141 lb 15.6 oz)     Recent Labs   Lab Result Units 12/01/20  1307 11/20/20  0355 11/19/20  0510 11/18/20  0351   RBC M/uL 3.19 L 2.80 L 2.39 L 2.66 L   Hemoglobin g/dL 9.3 L 8.1 L 6.8 L 7.7 L   Hematocrit % 31.1 L 26.4 L 22.7 L 26.1 L   WBC K/uL 5.61 6.82 7.77 10.96   Gran # (ANC) K/uL 3.7 4.7 5.5 9.2 H   Gran % %  --  69.1 71.1 83.5 H   Platelets K/uL 284 233 237 204   Sodium mmol/L 138 143 142 141   Potassium mmol/L 5.0 3.9 3.9 4.6   Chloride mmol/L 106 119 H 119 H 119 H   Glucose mg/dL 92 85 90 74   BUN mg/dL 57 H 36 H 38 H 40 H   Creatinine mg/dL 4.0 H 3.7 H 3.6 H 3.6 H   Calcium mg/dL 8.8 7.3 L 7.3 L 7.9 L   Total Protein g/dL 7.1 5.4 L 5.1 L 5.7 L   Albumin g/dL 3.3 L 2.2 L 2.1 L 2.2 L   Total Bilirubin mg/dL 0.7 0.9 0.9 2.2 H   Alkaline Phosphatase U/L 176 H 208 H 255 H 352 H   AST U/L 26 34 60 H 145 H   ALT U/L 18 68 H 90 H 130 H     The goals of cancer treatment include:  · Achieving remission of cancer, if possible  · Reducing tumor size and spread of cancer, if remission is not possible  · Minimizing pain and symptoms of the cancer  · Preventing infection and other complications of treatment  · Promoting adequate nutrition  · Encouraging proper hydration  · Improving or maintaining quality " of life  · Maintaining optimal therapy adherence  · Minimizing and managing side effects    Goals of Therapy Status: Discussed (new start)    Assessment/Plan  Patient plans to start therapy on 20    Indication, dosage, appropriateness, effectiveness, safety and convenience of her specialty medication(s) were reviewed today.     Patient Counseling    Counseled the patient on the following: doses and administration discussed, safe handling, storage, and disposal discussed, possible adverse effects and management discussed, possible drug and prescription drug interactions discussed, possible drug and OTC drug and food interactions discussed, lab monitoring and follow-up discussed, therapeutic rationale discussed, cost of medications and cost implications discussed, adherence and missed doses discussed, pharmacy contact information discussed       Initial Tagrisso consult completed on .  Tagrisso will be shipped on  to arrive at patient's home on  via [employer]. $0.00 copay. Address and CC info confirmed. Patient plans to start Tagrisso on Saturday, . Patient will call to let us know of any changes. Address confirmed. Confirmed 2 patient identifiers - name and . Therapy Appropriate.     Patient declined a review of pharmacy information as she is NOT new to [facility].     Reviewed proper handling and storage. Patient was instructed to store her medication at room temperature in a cool, dry place. Advised patient to avoid storing in the bathroom due to exposure to moisture and anywhere near direct heat (fireplace, stove, windowsill). Patient should not touch medication directly - recommended using gloves or shaking into cap of bottle/dosing cup. If patient comes into contact with medication, she was instructed to wash hands thoroughly afterwards to prevent transfer of residue to other surfaces and other individuals.      Counseled patient to take Tagrisso 40 mg - 1 tablet by mouth once daily without  regard to meals. Recommended to take with food to improve tolerance. Medication should be taken around the same time each day. Do not chew, crush, or break tablets.     Labs reviewed from 12/1/2020. CBC - stable. CMP - stable except creatinine remains elevated and above baseline. Hepatic function is stable. Patient's Estimated CrCl is 11 mL/min based on creatinine of 4.0 mg/dL on 12/1/2020.Recommendations per PI are as follows: CrCl ?15 mL/minute: There are no dosage adjustments provided in the 's labeling (has not been studied). Provider was messaged regarding the appropriateness of Tagrisso therapy in this patient given her reduced renal function. Informed her that patients with a CrCl that low have not been studied, so there is no information as to whether it is appropriate for patient to proceed with therapy and at what dose. MD has patient starting on lower dose - she states she will monitor patient's labs closely (weekly). Dosage appropriate based on therapy for Non-small cell lung cancer, metastatic, first-line (EGFR exon 19 deletion- or exon 21 [id number] mutation-positive): Oral: 80 mg once daily until disease progression or unacceptable toxicity (Mendieta 2018). Per 12/10 chart notes, MD would like patient to start on dose of 40 mg and increase to full dose of 80 mg if patient tolerates after 2 months.     Counseled patient on common SEs: skin rash (OTC hydrocortisone for red, itchy bumps - do NOT apply to open cuts, wounds, or broken skin); dry skin (eucerin cream for dry, peeling skin - recommended applying in the evening; patient experienced dry skin on prior Tarceva therapy); changes in nails; itching (OTC Benadryl); diarrhea (stay well hydrated and OTC Imodium - use as directed; if still >4 loose stools/day, notify [facility] due to risk for dehydration); mouth sores (home remedy - swish and spit 1 cup of warm water + 1/2 tsp salt + 1/2 tsp of baking soda); decreased appetite (monitor for changes);  "constipation (OTC Colace or Miralax); N/V (take with food; if anti-emetic is needed, notify [facility] to reach out to MD to request Rx); anemia/thrombocytopenia/neutropenia (monitor with labs); fatigue (monitor for changes in energy levels); increased risk for infection (monitor for fever, achy chills, wet persistent cough, and sore throat); increased risk for bruising/bleeding - blood in urine/stools, nose bleeds, or coughing blood); headache (OTC Tylenol); cough (dry water and OTC throat lozenge; SOB.     Discussed warnings/precautions: Cardiovascular toxicity (patient has underlying HLD and HTN as well as a history of stroke).    Appetite: Eats 3 meals/day and sometimes snacks in between  Energy: Reports her energy is "now fine" and that she is able to complete daily activities. She plans to incorporate more physical activity in daily life. Discussed working towards a goal of 150 minutes/week (5 days of 30 minute sessions) of moderate intensity activity to improve energy threshold to prevent and manage fatigue.   Pain: 0/10 - denies pain.    Patient had a good understanding of everything discuss. All questions were answered to her satisfaction. She is aware to contact [facility] if she needs anything.     Tasks added this encounter   1/9/2021 - Refill Call (Auto Added)  3/10/2021 - Clinical - Follow Up Assesement (90 day)  12/26/2020 - 7 Day Post Start Touchbase   Tasks due within next 3 months   No tasks due.     Cj Holt, PharmFLACO  [facility]  [address] [place] [id number]  Phone: [phone]  Fax: [phone]    "

## 2020-12-17 NOTE — TELEPHONE ENCOUNTER
"----- Message from Dorinda Giles sent at 12/17/2020  1:01 PM CST -----  HH Assist    Name of caller:  Layo / RN   Contact Preference:  [phone]  Does Patient feel the need to see the MD today? No   Physician:  George VALENTINO MD   What is the nature of the call?    - asking to extend home health therapy . Please call   If he doesn't answer you can leave a vm on secure line   Trying to get a verbal order.     Additional Notes:   "Thank you for all that you do for our patients'"          "

## 2020-12-17 NOTE — TELEPHONE ENCOUNTER
Contacted patient for her [facility] initial consult. Patient states she is heading out of her home now and would like a call back today around 4:30 PM.

## 2020-12-17 NOTE — TELEPHONE ENCOUNTER
"----- Message from Susan Ponce sent at 12/17/2020  2:04 PM CST -----  Contact: layo       Name of caller:  Layo / RN   Contact Preference:  [phone]  Does Patient feel the need to see the MD today? No   Physician:  George VALENTINO MD   What is the nature of the call?     - asking to extend home health therapy . Please call   If he doesn't answer you can leave a vm on secure line   Trying to get a verbal order.      Additional Notes:   "Thank you for all that you do for our patients'"               "

## 2020-12-18 NOTE — TELEPHONE ENCOUNTER
"----- Message from Surya Cheatham sent at 12/18/2020 11:21 AM CST -----   Consult/Advisory:    Name Of Caller:Naty   Contact Preference?: [phone]    Does patient feel the need to be seen today? No     What is the nature of the call?:  -pt request a callback to address questions regarding osimertinib (TAGRISSO) 40 mg Tab      Additional Notes:  "Thank you for all that you do for our patients'"    "

## 2020-12-18 NOTE — TELEPHONE ENCOUNTER
Scheduled patient for labs and appt with dr sullivan at the end of next week, as she will start tagrisso tomorrow.

## 2020-12-24 NOTE — PROGRESS NOTES
"Subjective:       Patient ID: Naty St is a 75 y.o. female.    Chief Complaint: Malignant neoplasm of lower lobe of right lung  Oncologic History:  Ms. Naty St was admitted to the hospital between 01/25/2016 and 01/28/2016. She has a history of pancreatic cancer 17 years ago, breast cancer and meningioma, presented to the hospital complaining of loss of consciousness. The patient apparently was in her normal state of health and she stood up to go to the bathroom and fell to the floor. The patient's daughter helped the mother up in the bathroom and noted that her mother's upper extremities were shaking and eye rolling. No reports of bowel or bladder incontinence, tongue biting or rolling. The second episode lasted about four minutes and she was extremely lethargic following that. Apparently, the patient had a meningioma resection done in the past; however, recently, underwent neurosurgical resection with Dr. 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    Additional immunohistochemical stains were performed which revealed the tumor cells to be are positive for TTF1 and negative for ER and GCDFP. The morphology and TTF1 positivity are most consistent with lung primary.    Of note, imaging scan at the end of January 2016 revealed a mass in the lung at 2 cm in the medial aspect of the apical segment of the right upper lobe abutting the mediastinum at the level of the azygous vein and abutting and possibly encasing the segmental bronchi and vessels of the apical segment of the right upper lobe and no pleural fluid was present. Also, there is an enlarged right paratracheal lymph node. No evidence of any metastatic disease at the pancreatic site with postoperative changes post Whipple disease  Her PET Scan from 2/15/16 reveal "Hypermetabolic mass in the right lung apex consistent with a " "primary malignancy. Hypermetabolic mediastinal lymph nodes consistent with metastatic disease. Right sacral hypermetabolic lesion consistent with metastatic disease, noting additional mildly sclerotic lesions in multiple vertebral bodies which do not demonstrate abnormal hypermetabolism  She underwent IR bone biopsy which revealed metastatic adenocarcinoma of lung origin. 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  She has completed focal RT to brain lesions in March 2016.    PET scan from 6/6/16 shows "Dramatic almost complete response to therapy."  Lovenox started after US lower ext 6/7/16 shows Acute complete occlusion of one of the left posterior tibial vein.Remote partial thrombus of the proximal left superficial femoral vein.  She is on Tarceva.   12/6/16 PET scan reveals "Stable right upper lobe lesion and right hilar lymph node. No new lesions identified. Trace left pleural effusion".  1/27/17 Renal u/s "Medical renal disease. Nonobstructive right nephrolithiasis"  1/31/17 PET - "Right upper lobe nodule and right hilar lymph node similar and very low grade activity.  There is no definite evidence of recurrence."   11/9/17 PET "In this patient with history of lung cancer, there is interval increase in size and hypermetabolism of a right upper lobe lung lesion concerning for recurrent disease. Additionally, there is interval appearance of a hypermetabolic paratracheal lymph node suspicious for metastatic disease"     She progressed on Tarceva She underwent EBUS on 12/5/17. Pathology revealed adenocarcinoma but T790m could not be done as quantity was not insufficient. Her PET scan from 1/30/18 revealed The patient's previously identified abnormal lesions is slightly greater uptake of FDG on today's study compared with prior exam. These findings are concerning for progression of disease"      She was hospitalized between 4/9/18-4/16/18. Her hospital course was as follows "[facility] with acute hypoxic respiratory failure. She was requiring 4 L of nc on admission. She was started on moxi for CAP. She received one dose of ceftriaxone in the ED. On 2nd day of admission she spiked a fever. Her Hb was ~7 g/dl so she was transfused 1 unit of PRBCs. Over night she developed worsening of her symptoms with increased O2 requirements to 15 L HFNC. Her CXR showed worsening congestion. There was a concern of TRALI vs TACO. New 2D echo with diastolic dysfunction. She was given IV lasix pushes as needed and was started on dexamethasone.  Her abx was escalated to vanc and cefepime. She improved with diuresis and steroids. Pulmonary were consulted. Her O2 requirements continued to improve. On 4/15 she was switched to Augmentin. PT recommended discharging her to SNF but the patient refused and she wanted to go home with home health. She qualified for home oxygen so she was discharged on 3 L nc while at rest and 6 while active. Augmentin and dexamethasone were discontinued on discharge"     She was readmitted from 4/27 to 5/3/18 with who presented to the ED with a complaint of fatigue and worsening DELA CRUZ. Pt diagnosed PNA 4/9 and was discharged on 4/16 on 3L oxygen. She was initially placed on cefepime for 3 days followed by an add'l 2 days of Augmentin. Pulm was consulted with assistance as her oxygen requirements were increasing. She was placed on oral steroids, then trailed on 80mg TID solumedrol for 2 days and will be discharged on a prednisone taper. She was also diuresed with 2 days of 40mg IV lasix with appropriate UOP resulting, improvement on repeat CXR. She was medically stable and appropriate for DC home with home health on 1L continuous O2. She will be seen for close f/u and may be able to come off oxygen at that time.      She discontinued Tagrisso in April 2018. Restaging scans from 6/4/18 revealed "Stable appearance of a spiculated right upper lobe pulmonary lesion.  Additional irregular focus " "superior to the lesion in the right lung apex is stable and may represent scar or additional lesion; although this was not hypermetabolic on prior PET-CT.  No new pulmonary lesions. 1.3 cm subcarinal lymph node, not hypermetabolic on prior PET-CT. Stable postsurgical changes of a Whipple procedure. Bilateral nonobstructing nephrolithiasis.  Stable sclerotic focus in the T5 vertebral body, possibly a bone island as this was not hypermetabolic on prior PET-CT. Small hiatal hernia. RECIST SUMMARY: Date of prior examination for comparison 01/30/2018 Lesion 1: Right upper lobe 1.3 cm Series 2 image 31 prior measurement 1.3 cm"  Bone scan also from 6/4/18 revealed no evidence of mets.     Her PET scan from 7/11/18 revealed "Right suprahilar lesion SUV max 3.76, previously 6.86. Pretracheal lymph node SUV max 2.55, previously 3.79. There is physiologic intracranial, head, and neck activity.  There is muscle activation.  There is vocal cord activation.  There is physiologic liver, spleen, GI and  activity.  There is a hiatal hernia.  Pelvic organs show nothing unusual.  No bone lesions are seen.  There are areas of benign appearing lung scar"  She notes fatigue.  She denies any nausea, vomiting, diarrhea, constipation, abdominal pain, weight loss or loss of appetite, chest pain, shortness of breath, leg swelling, fatigue, pain, headache, dizziness, or mood changes. Her ECOG PS is 2. She is accompanied by her  and son     She has been on Tarceva since 6/5/18. She completed SBRT to her right lung lesions.       Restaging PET scan from 10/15/2020 reveals "In this patient with history of lung cancer, the previously described hypermetabolic right paratracheal and right hilar lesions appears stable in regards to metabolic activity compared to prior exam.  However, there has been interval increase in size and metabolic activity of additional pulmonary nodules scattered within both lungs, as further detailed above. " " Findings concerning for disease progression/metastases.zAdditional findings as above"  She was hospitalized from 10/15-10/17/2020 with renal failure, Tarceva was held.     She was again hospitalized between 11/16/2020-11/20/2020 with fever, vomiting, abdominal pain and dizziness. She was given zosyn and IVF with improvement. Source suspicious for either intra-abdominal or from UTI. CXR was not concerning for infectious process. Blood cultures x2 showing klebsiella. CT A/P did not find intra abdominal infection. Anemia to hgb of 6.8 requiring 1u prbc on 11/19. Klebsiella returned pan sensitive and she was discharged with 2 week supply of renally adjusted ciprofloxacin which she completed./     HPI She comes in to review her GUARDANT results which reveal T790M   She denies any new issues. She is back on Tagrisso 40 mg (50% of dose) since 12/19/2020.    She denies any new issues in her breathing  Cough is at baseline, no chest pain  She denies any nausea, vomiting, diarrhea, constipation, abdominal pain, weight loss or loss of appetite, chest pain, shortness of breath, leg swelling, fatigue, pain, headache, dizziness, or mood changes. Her ECOG PS is one. She is accompanied by her son.   She is working with PT/OT at home.          Review of Systems   Constitutional: Negative for appetite change, fatigue and unexpected weight change.   HENT: Negative for mouth sores.    Eyes: Negative for visual disturbance.   Respiratory: Negative for cough and shortness of breath.    Cardiovascular: Negative for chest pain.   Gastrointestinal: Negative for abdominal pain and diarrhea.   Genitourinary: Negative for frequency.   Musculoskeletal: Negative for back pain.   Integumentary:  Negative for rash.   Neurological: Negative for headaches.   Hematological: Negative for adenopathy.   Psychiatric/Behavioral: The patient is not nervous/anxious.    All other systems reviewed and are negative.        Objective:      Physical " Exam  Vitals signs reviewed.   Constitutional:       Appearance: She is well-developed.   HENT:      Mouth/Throat:      Pharynx: No oropharyngeal exudate.   Cardiovascular:      Rate and Rhythm: Normal rate.      Heart sounds: Normal heart sounds.   Pulmonary:      Effort: Pulmonary effort is normal.      Breath sounds: Normal breath sounds. No wheezing.   Abdominal:      General: Bowel sounds are normal.      Palpations: Abdomen is soft.      Tenderness: There is no abdominal tenderness.   Musculoskeletal:         General: No tenderness.   Lymphadenopathy:      Cervical: No cervical adenopathy.   Skin:     General: Skin is warm and dry.      Findings: No rash.   Neurological:      Mental Status: She is alert and oriented to person, place, and time.      Coordination: Coordination normal.   Psychiatric:         Thought Content: Thought content normal.         Judgment: Judgment normal.           LABS:  WBC   Date Value Ref Range Status   12/24/2020 5.64 3.90 - 12.70 K/uL Final     Hemoglobin   Date Value Ref Range Status   12/24/2020 9.2 (L) 12.0 - 16.0 g/dL Final     POC Hematocrit   Date Value Ref Range Status   02/21/2020 30 (L) 36 - 54 %PCV Final     Hematocrit   Date Value Ref Range Status   12/24/2020 30.1 (L) 37.0 - 48.5 % Final     Platelets   Date Value Ref Range Status   12/24/2020 165 150 - 350 K/uL Final     Gran # (ANC)   Date Value Ref Range Status   12/24/2020 4.2 1.8 - 7.7 K/uL Final     Comment:     The ANC is based on a white cell differential from an   automated cell counter. It has not been microscopically   reviewed for the presence of abnormal cells. Clinical   correlation is required.         Chemistry        Component Value Date/Time     12/24/2020 1048    K 5.2 (H) 12/24/2020 1048     (H) 12/24/2020 1048    CO2 14 (L) 12/24/2020 1048    BUN 55 (H) 12/24/2020 1048    CREATININE 3.9 (H) 12/24/2020 1048     12/24/2020 1048        Component Value Date/Time    CALCIUM 8.2 (L)  12/24/2020 1048    ALKPHOS 106 12/24/2020 1048    AST 20 12/24/2020 1048    ALT 13 12/24/2020 1048    BILITOT 0.5 12/24/2020 1048    ESTGFRAFRICA 12.2 (A) 12/24/2020 1048    EGFRNONAA 10.6 (A) 12/24/2020 1048          Assessment:       1. Malignant neoplasm of lower lobe of right lung    2. 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 Brain metastases    5. Chronic kidney disease (CKD) stage G4/A3, severely decreased glomerular filtration rate (GFR) between 15-29 mL/min/1.73 square meter and albuminuria creatinine ratio greater than 300 mg/g        Plan:        1,2,3. She will continue with tagrisso 40 mg (50% dose) and will return weekly for labs and I will see her every 2 weeks. If she toelartes the dose of 40 mg for atleast 4 weeks without worsening creatinine or new shortness of breath then will increase to 80 mg    4. Stable , monitor  5. Stable monitor closely    Above care plan was discussed with patient and accompanying son and all questions were addressed to their satisfaction

## 2020-12-24 NOTE — Clinical Note
Schedule CBC,CMP weekly on Wedenesday or Thursday X 8 weeks.  Schedule to see me every 2 weeks on a Thursday X 4 times

## 2020-12-28 NOTE — TELEPHONE ENCOUNTER
Contacted patient for her Tagrisso 7 day touchbase.     Therapy start date: Saturday, 12/19  Dosing, how taking: Tagrisso 40 mg - takes 1 tablet by mouth once daily in the morning when she first wakes up. Denies missed doses.   Handling: Does not touch medication directly - uses cap of bottle for administration.   Side effects: None    Patient has no questions or concerns at this time. She is aware to contact [facility] if she needs anything.

## 2020-12-29 NOTE — TELEPHONE ENCOUNTER
----- Message from Alina Sigala sent at 12/29/2020 12:33 PM CST -----  Contact: Pt  Refill or New Rx: Refill    RX Name and Strength: appetite medication (pt doesn't know the name of medication)    Preferred Pharmacy with phone number:  [facility] - [address] [address]  Phone: [phone] Fax: [phone]    Communication Preference: [phone] (M)    Additional Information: Would like a call to confirm refill was sent

## 2020-12-29 NOTE — TELEPHONE ENCOUNTER
----- Message from Alina Sigala sent at 12/29/2020 12:33 PM CST -----  Contact: Pt  Refill or New Rx: Refill    RX Name and Strength: appetite medication (pt doesn't know the name of medication)    Preferred Pharmacy with phone number:  [facility] - [address] [address]  Phone: [phone] Fax: [phone]    Communication Preference: [phone] (M)    Additional Information: Would like a call to confirm refill was sent

## 2020-12-30 NOTE — TELEPHONE ENCOUNTER
"Returned call patient rescheduled lab for tomorrow at 1:30.        ----- Message from Surya Cheatham sent at 12/30/2020  8:03 AM CST -----  Reschedule Existing Appointment    Appt Date: 12/30/20  Type of appt : NON FASTING LAB [[id number]]  Physician: Dr Vazquez   Reason for rescheduling? Pt request a callback regarding rescheduling lab until tomorrow   Caller: Naty   Contact Preference:[phone]    Additional Information:  "Thank you for all that you do for our patients'"         "

## 2021-01-01 ENCOUNTER — HOSPITAL ENCOUNTER (OUTPATIENT)
Facility: HOSPITAL | Age: 76
Discharge: HOME HEALTH | End: 2021-07-23
Attending: EMERGENCY MEDICINE | Admitting: EMERGENCY MEDICINE
Payer: MEDICARE

## 2021-01-01 ENCOUNTER — PATIENT MESSAGE (OUTPATIENT)
Dept: HEMATOLOGY/ONCOLOGY | Facility: CLINIC | Age: 76
End: 2021-01-01

## 2021-01-01 ENCOUNTER — PATIENT MESSAGE (OUTPATIENT)
Dept: NEPHROLOGY | Facility: CLINIC | Age: 76
End: 2021-01-01

## 2021-01-01 ENCOUNTER — TELEPHONE (OUTPATIENT)
Dept: INTERNAL MEDICINE | Facility: CLINIC | Age: 76
End: 2021-01-01

## 2021-01-01 ENCOUNTER — HOSPITAL ENCOUNTER (OUTPATIENT)
Dept: RADIOLOGY | Facility: HOSPITAL | Age: 76
Discharge: HOME | End: 2021-02-17
Attending: INTERNAL MEDICINE
Payer: MEDICARE

## 2021-01-01 ENCOUNTER — TELEPHONE (OUTPATIENT)
Dept: HEMATOLOGY/ONCOLOGY | Facility: CLINIC | Age: 76
End: 2021-01-01

## 2021-01-01 ENCOUNTER — HOSPITAL ENCOUNTER (EMERGENCY)
Facility: HOSPITAL | Age: 76
Discharge: HOME | End: 2021-02-11
Attending: EMERGENCY MEDICINE
Payer: MEDICARE

## 2021-01-01 ENCOUNTER — TELEPHONE (OUTPATIENT)
Dept: NEPHROLOGY | Facility: CLINIC | Age: 76
End: 2021-01-01

## 2021-01-01 ENCOUNTER — INFUSION (OUTPATIENT)
Dept: INFECTIOUS DISEASES | Facility: HOSPITAL | Age: 76
End: 2021-01-01
Attending: INTERNAL MEDICINE
Payer: MEDICARE

## 2021-01-01 ENCOUNTER — PATIENT MESSAGE (OUTPATIENT)
Dept: PSYCHIATRY | Facility: CLINIC | Age: 76
End: 2021-01-01

## 2021-01-01 ENCOUNTER — OFFICE VISIT (OUTPATIENT)
Dept: INTERNAL MEDICINE | Facility: CLINIC | Age: 76
End: 2021-01-01
Payer: MEDICARE

## 2021-01-01 ENCOUNTER — HOSPITAL ENCOUNTER (INPATIENT)
Facility: HOSPITAL | Age: 76
LOS: 19 days | Discharge: HOME | DRG: 673 | End: 2021-05-17
Attending: EMERGENCY MEDICINE | Admitting: HOSPITALIST
Payer: MEDICARE

## 2021-01-01 ENCOUNTER — PES CALL (OUTPATIENT)
Dept: ADMINISTRATIVE | Facility: CLINIC | Age: 76
End: 2021-01-01

## 2021-01-01 ENCOUNTER — EXTERNAL HOME HEALTH (OUTPATIENT)
Dept: HOME HEALTH SERVICES | Facility: HOSPITAL | Age: 76
End: 2021-01-01
Payer: MEDICARE

## 2021-01-01 ENCOUNTER — NURSE TRIAGE (OUTPATIENT)
Dept: ADMINISTRATIVE | Facility: CLINIC | Age: 76
End: 2021-01-01

## 2021-01-01 ENCOUNTER — DOCUMENT SCAN (OUTPATIENT)
Dept: HOME HEALTH SERVICES | Facility: HOSPITAL | Age: 76
End: 2021-01-01
Payer: MEDICARE

## 2021-01-01 ENCOUNTER — PATIENT MESSAGE (OUTPATIENT)
Dept: ADMINISTRATIVE | Facility: HOSPITAL | Age: 76
End: 2021-01-01

## 2021-01-01 ENCOUNTER — CARE AT HOME (OUTPATIENT)
Dept: HOME HEALTH SERVICES | Facility: CLINIC | Age: 76
End: 2021-01-01
Payer: MEDICARE

## 2021-01-01 ENCOUNTER — PATIENT MESSAGE (OUTPATIENT)
Dept: INTERNAL MEDICINE | Facility: CLINIC | Age: 76
End: 2021-01-01

## 2021-01-01 ENCOUNTER — TELEPHONE (OUTPATIENT)
Dept: VASCULAR SURGERY | Facility: CLINIC | Age: 76
End: 2021-01-01

## 2021-01-01 ENCOUNTER — SPECIALTY PHARMACY (OUTPATIENT)
Dept: PHARMACY | Facility: CLINIC | Age: 76
End: 2021-01-01

## 2021-01-01 ENCOUNTER — OFFICE VISIT (OUTPATIENT)
Dept: CARDIOLOGY | Facility: CLINIC | Age: 76
End: 2021-01-01
Payer: MEDICARE

## 2021-01-01 ENCOUNTER — HOSPITAL ENCOUNTER (INPATIENT)
Facility: HOSPITAL | Age: 76
LOS: 5 days | Discharge: HOME HEALTH | DRG: 682 | End: 2021-01-26
Attending: INTERNAL MEDICINE | Admitting: INTERNAL MEDICINE
Payer: MEDICARE

## 2021-01-01 ENCOUNTER — HOSPITAL ENCOUNTER (INPATIENT)
Facility: HOSPITAL | Age: 76
LOS: 4 days | Discharge: HOME HEALTH | DRG: 291 | End: 2021-03-12
Attending: EMERGENCY MEDICINE | Admitting: EMERGENCY MEDICINE
Payer: MEDICARE

## 2021-01-01 ENCOUNTER — HOSPITAL ENCOUNTER (INPATIENT)
Facility: HOSPITAL | Age: 76
LOS: 11 days | Discharge: HOSPICE | DRG: 640 | End: 2021-08-23
Attending: EMERGENCY MEDICINE | Admitting: INTERNAL MEDICINE
Payer: MEDICARE

## 2021-01-01 ENCOUNTER — HOSPITAL ENCOUNTER (EMERGENCY)
Facility: OTHER | Age: 76
Discharge: HOME | End: 2021-06-26
Attending: EMERGENCY MEDICINE
Payer: MEDICARE

## 2021-01-01 ENCOUNTER — OFFICE VISIT (OUTPATIENT)
Dept: PSYCHIATRY | Facility: CLINIC | Age: 76
End: 2021-01-01
Payer: MEDICARE

## 2021-01-01 ENCOUNTER — HOSPITAL ENCOUNTER (EMERGENCY)
Facility: HOSPITAL | Age: 76
Discharge: HOME | End: 2021-01-08
Attending: EMERGENCY MEDICINE
Payer: MEDICARE

## 2021-01-01 ENCOUNTER — OFFICE VISIT (OUTPATIENT)
Dept: HEMATOLOGY/ONCOLOGY | Facility: CLINIC | Age: 76
End: 2021-01-01
Payer: MEDICARE

## 2021-01-01 ENCOUNTER — HOSPITAL ENCOUNTER (INPATIENT)
Facility: HOSPITAL | Age: 76
LOS: 1 days | DRG: 951 | End: 2021-08-24
Attending: INTERNAL MEDICINE | Admitting: INTERNAL MEDICINE
Payer: OTHER MISCELLANEOUS

## 2021-01-01 ENCOUNTER — OFFICE VISIT (OUTPATIENT)
Dept: NEPHROLOGY | Facility: CLINIC | Age: 76
End: 2021-01-01
Payer: MEDICARE

## 2021-01-01 ENCOUNTER — TELEPHONE (OUTPATIENT)
Dept: UROLOGY | Facility: CLINIC | Age: 76
End: 2021-01-01

## 2021-01-01 ENCOUNTER — TELEPHONE (OUTPATIENT)
Dept: HOME HEALTH SERVICES | Facility: CLINIC | Age: 76
End: 2021-01-01

## 2021-01-01 ENCOUNTER — LAB VISIT (OUTPATIENT)
Dept: LAB | Facility: HOSPITAL | Age: 76
End: 2021-01-01
Attending: INTERNAL MEDICINE
Payer: MEDICARE

## 2021-01-01 ENCOUNTER — PATIENT OUTREACH (OUTPATIENT)
Dept: ADMINISTRATIVE | Facility: OTHER | Age: 76
End: 2021-01-01

## 2021-01-01 ENCOUNTER — TELEPHONE (OUTPATIENT)
Dept: PHARMACY | Facility: CLINIC | Age: 76
End: 2021-01-01

## 2021-01-01 ENCOUNTER — TELEPHONE (OUTPATIENT)
Dept: INFECTIOUS DISEASES | Facility: HOSPITAL | Age: 76
End: 2021-01-01

## 2021-01-01 ENCOUNTER — OFFICE VISIT (OUTPATIENT)
Dept: NEPHROLOGY | Facility: CLINIC | Age: 76
DRG: 673 | End: 2021-01-01
Payer: MEDICARE

## 2021-01-01 ENCOUNTER — HOSPITAL ENCOUNTER (OUTPATIENT)
Dept: RADIOLOGY | Facility: HOSPITAL | Age: 76
Discharge: HOME | End: 2021-03-01
Attending: INTERNAL MEDICINE
Payer: MEDICARE

## 2021-01-01 ENCOUNTER — TELEPHONE (OUTPATIENT)
Dept: INFUSION THERAPY | Facility: HOSPITAL | Age: 76
End: 2021-01-01

## 2021-01-01 ENCOUNTER — ANESTHESIA (OUTPATIENT)
Dept: SURGERY | Facility: HOSPITAL | Age: 76
DRG: 673 | End: 2021-01-01
Payer: MEDICARE

## 2021-01-01 ENCOUNTER — ANESTHESIA EVENT (OUTPATIENT)
Dept: SURGERY | Facility: HOSPITAL | Age: 76
DRG: 673 | End: 2021-01-01
Payer: MEDICARE

## 2021-01-01 ENCOUNTER — PATIENT OUTREACH (OUTPATIENT)
Dept: ADMINISTRATIVE | Facility: CLINIC | Age: 76
End: 2021-01-01

## 2021-01-01 ENCOUNTER — OFFICE VISIT (OUTPATIENT)
Dept: SURGERY | Facility: CLINIC | Age: 76
End: 2021-01-01
Payer: MEDICARE

## 2021-01-01 ENCOUNTER — HOSPITAL ENCOUNTER (EMERGENCY)
Facility: HOSPITAL | Age: 76
Discharge: LEFT BEFORE DISCHARGE | End: 2021-07-03
Attending: EMERGENCY MEDICINE
Payer: MEDICARE

## 2021-01-01 ENCOUNTER — OFFICE VISIT (OUTPATIENT)
Dept: NEUROLOGY | Facility: CLINIC | Age: 76
End: 2021-01-01
Payer: MEDICARE

## 2021-01-01 ENCOUNTER — OFFICE VISIT (OUTPATIENT)
Dept: GASTROENTEROLOGY | Facility: CLINIC | Age: 76
End: 2021-01-01
Payer: MEDICARE

## 2021-01-01 VITALS
DIASTOLIC BLOOD PRESSURE: 60 MMHG | OXYGEN SATURATION: 97 % | HEIGHT: 65 IN | WEIGHT: 129 LBS | SYSTOLIC BLOOD PRESSURE: 110 MMHG | HEART RATE: 83 BPM | BODY MASS INDEX: 21.49 KG/M2

## 2021-01-01 VITALS
SYSTOLIC BLOOD PRESSURE: 124 MMHG | BODY MASS INDEX: 20.14 KG/M2 | HEIGHT: 65 IN | RESPIRATION RATE: 16 BRPM | DIASTOLIC BLOOD PRESSURE: 68 MMHG | TEMPERATURE: 99 F | OXYGEN SATURATION: 99 % | HEART RATE: 68 BPM

## 2021-01-01 VITALS
HEIGHT: 65 IN | RESPIRATION RATE: 19 BRPM | OXYGEN SATURATION: 95 % | BODY MASS INDEX: 21.12 KG/M2 | WEIGHT: 126.75 LBS | SYSTOLIC BLOOD PRESSURE: 111 MMHG | TEMPERATURE: 98 F | DIASTOLIC BLOOD PRESSURE: 57 MMHG | HEART RATE: 87 BPM

## 2021-01-01 VITALS
HEIGHT: 66 IN | HEART RATE: 90 BPM | DIASTOLIC BLOOD PRESSURE: 64 MMHG | WEIGHT: 125.44 LBS | SYSTOLIC BLOOD PRESSURE: 102 MMHG | BODY MASS INDEX: 20.16 KG/M2 | OXYGEN SATURATION: 99 %

## 2021-01-01 VITALS
WEIGHT: 130 LBS | RESPIRATION RATE: 16 BRPM | OXYGEN SATURATION: 96 % | DIASTOLIC BLOOD PRESSURE: 73 MMHG | BODY MASS INDEX: 21.66 KG/M2 | SYSTOLIC BLOOD PRESSURE: 151 MMHG | TEMPERATURE: 98 F | HEIGHT: 65 IN | HEART RATE: 90 BPM

## 2021-01-01 VITALS
DIASTOLIC BLOOD PRESSURE: 67 MMHG | SYSTOLIC BLOOD PRESSURE: 118 MMHG | RESPIRATION RATE: 16 BRPM | HEART RATE: 92 BPM | WEIGHT: 138.69 LBS | BODY MASS INDEX: 23.11 KG/M2 | TEMPERATURE: 97 F | HEIGHT: 65 IN | OXYGEN SATURATION: 99 %

## 2021-01-01 VITALS
SYSTOLIC BLOOD PRESSURE: 162 MMHG | OXYGEN SATURATION: 93 % | TEMPERATURE: 97 F | HEIGHT: 65 IN | BODY MASS INDEX: 23.88 KG/M2 | HEART RATE: 89 BPM | RESPIRATION RATE: 16 BRPM | WEIGHT: 143.31 LBS | DIASTOLIC BLOOD PRESSURE: 77 MMHG

## 2021-01-01 VITALS
SYSTOLIC BLOOD PRESSURE: 112 MMHG | DIASTOLIC BLOOD PRESSURE: 58 MMHG | BODY MASS INDEX: 22.08 KG/M2 | WEIGHT: 132.5 LBS | HEIGHT: 65 IN

## 2021-01-01 VITALS
HEART RATE: 79 BPM | HEIGHT: 65 IN | BODY MASS INDEX: 20.66 KG/M2 | TEMPERATURE: 99 F | WEIGHT: 124.56 LBS | RESPIRATION RATE: 16 BRPM | DIASTOLIC BLOOD PRESSURE: 91 MMHG | WEIGHT: 123.25 LBS | HEART RATE: 86 BPM | DIASTOLIC BLOOD PRESSURE: 71 MMHG | SYSTOLIC BLOOD PRESSURE: 154 MMHG | TEMPERATURE: 99 F | OXYGEN SATURATION: 98 % | OXYGEN SATURATION: 98 % | RESPIRATION RATE: 17 BRPM | SYSTOLIC BLOOD PRESSURE: 152 MMHG | HEART RATE: 82 BPM | DIASTOLIC BLOOD PRESSURE: 71 MMHG | BODY MASS INDEX: 20.54 KG/M2 | SYSTOLIC BLOOD PRESSURE: 151 MMHG | WEIGHT: 124 LBS | HEIGHT: 65 IN | TEMPERATURE: 98 F | BODY MASS INDEX: 20.73 KG/M2

## 2021-01-01 VITALS
WEIGHT: 147.06 LBS | RESPIRATION RATE: 24 BRPM | SYSTOLIC BLOOD PRESSURE: 66 MMHG | TEMPERATURE: 98 F | OXYGEN SATURATION: 92 % | HEIGHT: 65 IN | HEART RATE: 73 BPM | BODY MASS INDEX: 24.5 KG/M2 | DIASTOLIC BLOOD PRESSURE: 30 MMHG

## 2021-01-01 VITALS
DIASTOLIC BLOOD PRESSURE: 64 MMHG | HEIGHT: 65 IN | TEMPERATURE: 99 F | SYSTOLIC BLOOD PRESSURE: 141 MMHG | WEIGHT: 129.5 LBS | HEART RATE: 84 BPM | SYSTOLIC BLOOD PRESSURE: 142 MMHG | HEART RATE: 88 BPM | HEIGHT: 65 IN | OXYGEN SATURATION: 95 % | RESPIRATION RATE: 18 BRPM | DIASTOLIC BLOOD PRESSURE: 58 MMHG | BODY MASS INDEX: 21.04 KG/M2 | OXYGEN SATURATION: 96 % | WEIGHT: 126.31 LBS | DIASTOLIC BLOOD PRESSURE: 67 MMHG | BODY MASS INDEX: 21.58 KG/M2 | TEMPERATURE: 98 F | SYSTOLIC BLOOD PRESSURE: 110 MMHG | RESPIRATION RATE: 20 BRPM | HEART RATE: 93 BPM

## 2021-01-01 VITALS — RESPIRATION RATE: 26 BRPM

## 2021-01-01 VITALS
HEART RATE: 88 BPM | OXYGEN SATURATION: 92 % | SYSTOLIC BLOOD PRESSURE: 150 MMHG | TEMPERATURE: 98 F | RESPIRATION RATE: 20 BRPM | TEMPERATURE: 98 F | BODY MASS INDEX: 23.24 KG/M2 | OXYGEN SATURATION: 100 % | HEIGHT: 66 IN | WEIGHT: 144.63 LBS | RESPIRATION RATE: 20 BRPM | WEIGHT: 124.19 LBS | HEART RATE: 82 BPM | HEIGHT: 65 IN | HEART RATE: 83 BPM | OXYGEN SATURATION: 98 % | SYSTOLIC BLOOD PRESSURE: 124 MMHG | SYSTOLIC BLOOD PRESSURE: 147 MMHG | RESPIRATION RATE: 20 BRPM | BODY MASS INDEX: 21.6 KG/M2 | WEIGHT: 129.63 LBS | BODY MASS INDEX: 19.96 KG/M2 | HEIGHT: 66 IN | DIASTOLIC BLOOD PRESSURE: 68 MMHG | DIASTOLIC BLOOD PRESSURE: 61 MMHG | DIASTOLIC BLOOD PRESSURE: 69 MMHG

## 2021-01-01 VITALS
HEIGHT: 66 IN | SYSTOLIC BLOOD PRESSURE: 115 MMHG | DIASTOLIC BLOOD PRESSURE: 70 MMHG | RESPIRATION RATE: 17 BRPM | WEIGHT: 131.38 LBS | TEMPERATURE: 97 F | OXYGEN SATURATION: 97 % | BODY MASS INDEX: 21.11 KG/M2 | HEART RATE: 95 BPM

## 2021-01-01 VITALS
DIASTOLIC BLOOD PRESSURE: 60 MMHG | WEIGHT: 121.25 LBS | HEIGHT: 65 IN | SYSTOLIC BLOOD PRESSURE: 106 MMHG | OXYGEN SATURATION: 100 % | BODY MASS INDEX: 20.2 KG/M2

## 2021-01-01 VITALS
BODY MASS INDEX: 19.7 KG/M2 | RESPIRATION RATE: 18 BRPM | HEART RATE: 94 BPM | DIASTOLIC BLOOD PRESSURE: 64 MMHG | TEMPERATURE: 98 F | OXYGEN SATURATION: 98 % | WEIGHT: 122.56 LBS | SYSTOLIC BLOOD PRESSURE: 121 MMHG | HEIGHT: 66 IN

## 2021-01-01 VITALS
DIASTOLIC BLOOD PRESSURE: 66 MMHG | SYSTOLIC BLOOD PRESSURE: 135 MMHG | BODY MASS INDEX: 20.14 KG/M2 | RESPIRATION RATE: 18 BRPM | OXYGEN SATURATION: 100 % | TEMPERATURE: 97 F | WEIGHT: 121 LBS | HEART RATE: 74 BPM

## 2021-01-01 VITALS
HEIGHT: 65 IN | HEART RATE: 82 BPM | TEMPERATURE: 98 F | RESPIRATION RATE: 18 BRPM | DIASTOLIC BLOOD PRESSURE: 65 MMHG | DIASTOLIC BLOOD PRESSURE: 68 MMHG | SYSTOLIC BLOOD PRESSURE: 117 MMHG | OXYGEN SATURATION: 97 % | SYSTOLIC BLOOD PRESSURE: 120 MMHG | HEART RATE: 81 BPM | WEIGHT: 121 LBS | BODY MASS INDEX: 20.16 KG/M2

## 2021-01-01 VITALS
DIASTOLIC BLOOD PRESSURE: 80 MMHG | HEART RATE: 88 BPM | BODY MASS INDEX: 20.18 KG/M2 | WEIGHT: 121.25 LBS | OXYGEN SATURATION: 98 % | SYSTOLIC BLOOD PRESSURE: 138 MMHG

## 2021-01-01 VITALS
HEIGHT: 65 IN | HEART RATE: 86 BPM | BODY MASS INDEX: 20.54 KG/M2 | DIASTOLIC BLOOD PRESSURE: 76 MMHG | SYSTOLIC BLOOD PRESSURE: 144 MMHG | WEIGHT: 123.25 LBS

## 2021-01-01 VITALS
HEART RATE: 79 BPM | DIASTOLIC BLOOD PRESSURE: 80 MMHG | TEMPERATURE: 98 F | OXYGEN SATURATION: 97 % | SYSTOLIC BLOOD PRESSURE: 132 MMHG | RESPIRATION RATE: 20 BRPM

## 2021-01-01 VITALS
TEMPERATURE: 99 F | HEIGHT: 65 IN | HEART RATE: 87 BPM | RESPIRATION RATE: 18 BRPM | WEIGHT: 130 LBS | OXYGEN SATURATION: 99 % | DIASTOLIC BLOOD PRESSURE: 62 MMHG | SYSTOLIC BLOOD PRESSURE: 126 MMHG | BODY MASS INDEX: 21.66 KG/M2

## 2021-01-01 DIAGNOSIS — I50.33 ACUTE ON CHRONIC DIASTOLIC HEART FAILURE: ICD-10-CM

## 2021-01-01 DIAGNOSIS — J96.21 ACUTE ON CHRONIC RESPIRATORY FAILURE WITH HYPOXIA: ICD-10-CM

## 2021-01-01 DIAGNOSIS — R41.82 ALTERED MENTAL STATUS, UNSPECIFIED ALTERED MENTAL STATUS TYPE: ICD-10-CM

## 2021-01-01 DIAGNOSIS — F43.21 ADJUSTMENT DISORDER WITH DEPRESSED MOOD: Primary | ICD-10-CM

## 2021-01-01 DIAGNOSIS — N17.9 ACUTE KIDNEY INJURY SUPERIMPOSED ON CHRONIC KIDNEY DISEASE: ICD-10-CM

## 2021-01-01 DIAGNOSIS — C34.31 MALIGNANT NEOPLASM OF LOWER LOBE OF RIGHT LUNG: ICD-10-CM

## 2021-01-01 DIAGNOSIS — R60.9 EDEMA, UNSPECIFIED TYPE: ICD-10-CM

## 2021-01-01 DIAGNOSIS — E87.1 HYPONATREMIA: Primary | ICD-10-CM

## 2021-01-01 DIAGNOSIS — D64.9 ANEMIA, UNSPECIFIED TYPE: Primary | ICD-10-CM

## 2021-01-01 DIAGNOSIS — N18.9 ACUTE KIDNEY INJURY SUPERIMPOSED ON CHRONIC KIDNEY DISEASE: ICD-10-CM

## 2021-01-01 DIAGNOSIS — N18.30 STAGE 3 CHRONIC KIDNEY DISEASE, UNSPECIFIED WHETHER STAGE 3A OR 3B CKD: Primary | ICD-10-CM

## 2021-01-01 DIAGNOSIS — N32.81 OAB (OVERACTIVE BLADDER): ICD-10-CM

## 2021-01-01 DIAGNOSIS — E83.52 HYPERCALCEMIA: ICD-10-CM

## 2021-01-01 DIAGNOSIS — N30.00 ACUTE CYSTITIS WITHOUT HEMATURIA: Primary | ICD-10-CM

## 2021-01-01 DIAGNOSIS — I51.89 DIASTOLIC DYSFUNCTION: ICD-10-CM

## 2021-01-01 DIAGNOSIS — I10 ESSENTIAL HYPERTENSION: ICD-10-CM

## 2021-01-01 DIAGNOSIS — R79.89 ELEVATED TROPONIN I LEVEL: ICD-10-CM

## 2021-01-01 DIAGNOSIS — Z79.01 ANTICOAGULATED: ICD-10-CM

## 2021-01-01 DIAGNOSIS — I63.511 CEREBROVASCULAR ACCIDENT (CVA) DUE TO OCCLUSION OF RIGHT MIDDLE CEREBRAL ARTERY: ICD-10-CM

## 2021-01-01 DIAGNOSIS — F43.21 ADJUSTMENT DISORDER WITH DEPRESSED MOOD: ICD-10-CM

## 2021-01-01 DIAGNOSIS — E43 SEVERE PROTEIN-CALORIE MALNUTRITION: ICD-10-CM

## 2021-01-01 DIAGNOSIS — J96.01 ACUTE HYPOXEMIC RESPIRATORY FAILURE: Primary | ICD-10-CM

## 2021-01-01 DIAGNOSIS — C79.31 MALIGNANT NEOPLASM METASTATIC TO BRAIN: ICD-10-CM

## 2021-01-01 DIAGNOSIS — R53.1 GENERALIZED WEAKNESS: ICD-10-CM

## 2021-01-01 DIAGNOSIS — F33.41 RECURRENT MAJOR DEPRESSIVE DISORDER, IN PARTIAL REMISSION: ICD-10-CM

## 2021-01-01 DIAGNOSIS — G40.909 SEIZURE DISORDER: ICD-10-CM

## 2021-01-01 DIAGNOSIS — Z51.5 PALLIATIVE CARE ENCOUNTER: Primary | ICD-10-CM

## 2021-01-01 DIAGNOSIS — C25.9 PANCREATIC CANCER: Primary | ICD-10-CM

## 2021-01-01 DIAGNOSIS — C79.31 BRAIN METASTASES: ICD-10-CM

## 2021-01-01 DIAGNOSIS — F33.41 RECURRENT MAJOR DEPRESSIVE DISORDER, IN PARTIAL REMISSION: Primary | ICD-10-CM

## 2021-01-01 DIAGNOSIS — K59.00 CONSTIPATION, UNSPECIFIED CONSTIPATION TYPE: ICD-10-CM

## 2021-01-01 DIAGNOSIS — E83.42 HYPOMAGNESEMIA: ICD-10-CM

## 2021-01-01 DIAGNOSIS — I42.0 DILATED CARDIOMYOPATHY: ICD-10-CM

## 2021-01-01 DIAGNOSIS — D63.1 ANEMIA DUE TO CHRONIC KIDNEY DISEASE, ON CHRONIC DIALYSIS: ICD-10-CM

## 2021-01-01 DIAGNOSIS — C34.90 LUNG CANCER METASTATIC TO BRAIN: ICD-10-CM

## 2021-01-01 DIAGNOSIS — R54 FRAILTY: ICD-10-CM

## 2021-01-01 DIAGNOSIS — R53.81 DEBILITY: ICD-10-CM

## 2021-01-01 DIAGNOSIS — C34.31 MALIGNANT NEOPLASM OF LOWER LOBE OF RIGHT LUNG: Primary | ICD-10-CM

## 2021-01-01 DIAGNOSIS — N18.6 ESRD ON PERITONEAL DIALYSIS: ICD-10-CM

## 2021-01-01 DIAGNOSIS — R90.89 ABNORMAL FINDING ON MRI OF BRAIN: ICD-10-CM

## 2021-01-01 DIAGNOSIS — I27.82 OTHER CHRONIC PULMONARY EMBOLISM WITHOUT ACUTE COR PULMONALE: ICD-10-CM

## 2021-01-01 DIAGNOSIS — C79.9 METASTATIC MALIGNANT NEOPLASM, UNSPECIFIED SITE: ICD-10-CM

## 2021-01-01 DIAGNOSIS — Z86.718 HISTORY OF DEEP VEIN THROMBOSIS (DVT) OF LOWER EXTREMITY: ICD-10-CM

## 2021-01-01 DIAGNOSIS — M79.89 LEG SWELLING: Primary | ICD-10-CM

## 2021-01-01 DIAGNOSIS — Z71.89 COUNSELING REGARDING ADVANCE CARE PLANNING AND GOALS OF CARE: ICD-10-CM
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
## 2021-01-01 DIAGNOSIS — N18.4 CHRONIC KIDNEY DISEASE (CKD) STAGE G4/A3, SEVERELY DECREASED GLOMERULAR FILTRATION RATE (GFR) BETWEEN 15-29 ML/MIN/1.73 SQUARE METER AND ALBUMINURIA CREATININE RATIO GREATER THAN 300 MG/G: Primary | ICD-10-CM

## 2021-01-01 DIAGNOSIS — C34.91 ADENOCARCINOMA OF LUNG, RIGHT: ICD-10-CM

## 2021-01-01 DIAGNOSIS — R06.02 SOB (SHORTNESS OF BREATH): ICD-10-CM

## 2021-01-01 DIAGNOSIS — J81.0 ACUTE PULMONARY EDEMA: ICD-10-CM

## 2021-01-01 DIAGNOSIS — N18.6 ANEMIA DUE TO CHRONIC KIDNEY DISEASE, ON CHRONIC DIALYSIS: ICD-10-CM

## 2021-01-01 DIAGNOSIS — N18.5 CKD (CHRONIC KIDNEY DISEASE) STAGE 5, GFR LESS THAN 15 ML/MIN: ICD-10-CM

## 2021-01-01 DIAGNOSIS — R53.83 FATIGUE, UNSPECIFIED TYPE: ICD-10-CM

## 2021-01-01 DIAGNOSIS — Z99.2 ESRD ON PERITONEAL DIALYSIS: ICD-10-CM

## 2021-01-01 DIAGNOSIS — R53.0 NEOPLASTIC MALIGNANT RELATED FATIGUE: ICD-10-CM

## 2021-01-01 DIAGNOSIS — J81.1 PULMONARY EDEMA: ICD-10-CM

## 2021-01-01 DIAGNOSIS — Z85.3 HISTORY OF BREAST CANCER: ICD-10-CM
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
## 2021-01-01 DIAGNOSIS — I50.9 ACUTE EXACERBATION OF CHF (CONGESTIVE HEART FAILURE): ICD-10-CM

## 2021-01-01 DIAGNOSIS — N18.32 STAGE 3B CHRONIC KIDNEY DISEASE: Primary | ICD-10-CM

## 2021-01-01 DIAGNOSIS — N18.6 ESRD (END STAGE RENAL DISEASE): ICD-10-CM

## 2021-01-01 DIAGNOSIS — E83.51 HYPOCALCEMIA: ICD-10-CM

## 2021-01-01 DIAGNOSIS — N18.4 CHRONIC KIDNEY DISEASE (CKD) STAGE G4/A3, SEVERELY DECREASED GLOMERULAR FILTRATION RATE (GFR) BETWEEN 15-29 ML/MIN/1.73 SQUARE METER AND ALBUMINURIA CREATININE RATIO GREATER THAN 300 MG/G: ICD-10-CM

## 2021-01-01 DIAGNOSIS — R53.1 WEAKNESS: ICD-10-CM

## 2021-01-01 DIAGNOSIS — E87.5 HYPERKALEMIA: ICD-10-CM

## 2021-01-01 DIAGNOSIS — C34.91 ADENOCARCINOMA OF RIGHT LUNG: ICD-10-CM

## 2021-01-01 DIAGNOSIS — D64.9 ANEMIA, UNSPECIFIED TYPE: ICD-10-CM
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
## 2021-01-01 DIAGNOSIS — R09.02 HYPOXIA: ICD-10-CM

## 2021-01-01 DIAGNOSIS — D63.1 ANEMIA DUE TO STAGE 4 CHRONIC KIDNEY DISEASE: ICD-10-CM

## 2021-01-01 DIAGNOSIS — Z71.89 GOALS OF CARE, COUNSELING/DISCUSSION: ICD-10-CM

## 2021-01-01 DIAGNOSIS — R06.02 SHORTNESS OF BREATH: ICD-10-CM

## 2021-01-01 DIAGNOSIS — N30.00 ACUTE CYSTITIS WITHOUT HEMATURIA: ICD-10-CM

## 2021-01-01 DIAGNOSIS — E87.20 METABOLIC ACIDOSIS: ICD-10-CM

## 2021-01-01 DIAGNOSIS — D50.0 IRON DEFICIENCY ANEMIA DUE TO CHRONIC BLOOD LOSS: ICD-10-CM

## 2021-01-01 DIAGNOSIS — R94.31 QT PROLONGATION: ICD-10-CM

## 2021-01-01 DIAGNOSIS — I27.20 PULMONARY HYPERTENSION: ICD-10-CM

## 2021-01-01 DIAGNOSIS — R07.9 CHEST PAIN: ICD-10-CM

## 2021-01-01 DIAGNOSIS — I63.9 RIGHT-SIDED CEREBROVASCULAR ACCIDENT (CVA): ICD-10-CM

## 2021-01-01 DIAGNOSIS — J70.1 CHRONIC AND OTHER PULMONARY MANIFESTATIONS DUE TO RADIATION: ICD-10-CM

## 2021-01-01 DIAGNOSIS — I12.9 PARENCHYMAL RENAL HYPERTENSION: ICD-10-CM

## 2021-01-01 DIAGNOSIS — R26.2 INABILITY TO AMBULATE DUE TO MULTIPLE JOINTS: ICD-10-CM

## 2021-01-01 DIAGNOSIS — J96.01 ACUTE HYPOXEMIC RESPIRATORY FAILURE: ICD-10-CM

## 2021-01-01 DIAGNOSIS — C34.90 ADENOCARCINOMA OF LUNG, UNSPECIFIED LATERALITY: ICD-10-CM

## 2021-01-01 DIAGNOSIS — R60.0 BILATERAL LOWER EXTREMITY EDEMA: ICD-10-CM

## 2021-01-01 DIAGNOSIS — C34.91 ADENOCARCINOMA OF RIGHT LUNG: Primary | ICD-10-CM

## 2021-01-01 DIAGNOSIS — E55.9 VITAMIN D DEFICIENCY: ICD-10-CM

## 2021-01-01 DIAGNOSIS — C77.1 SECONDARY MALIGNANT NEOPLASM OF INTRATHORACIC LYMPH NODES: ICD-10-CM

## 2021-01-01 DIAGNOSIS — D63.0 ANEMIA IN NEOPLASTIC DISEASE: ICD-10-CM

## 2021-01-01 DIAGNOSIS — N18.4 ANEMIA DUE TO STAGE 4 CHRONIC KIDNEY DISEASE: ICD-10-CM

## 2021-01-01 DIAGNOSIS — T85.611A PERITONEAL DIALYSIS CATHETER DYSFUNCTION, INITIAL ENCOUNTER: ICD-10-CM

## 2021-01-01 DIAGNOSIS — D50.0 ANEMIA DUE TO CHRONIC BLOOD LOSS: Primary | ICD-10-CM

## 2021-01-01 DIAGNOSIS — J81.0 ACUTE PULMONARY EDEMA: Primary | ICD-10-CM

## 2021-01-01 DIAGNOSIS — D62 ACUTE BLOOD LOSS ANEMIA: ICD-10-CM

## 2021-01-01 DIAGNOSIS — C79.31 LUNG CANCER METASTATIC TO BRAIN: ICD-10-CM

## 2021-01-01 DIAGNOSIS — I50.9 ACUTE DECOMPENSATED HEART FAILURE: ICD-10-CM

## 2021-01-01 DIAGNOSIS — K59.01 SLOW TRANSIT CONSTIPATION: ICD-10-CM

## 2021-01-01 DIAGNOSIS — G40.909 SEIZURE DISORDER: Primary | ICD-10-CM

## 2021-01-01 DIAGNOSIS — S09.90XA CLOSED HEAD INJURY, INITIAL ENCOUNTER: Primary | ICD-10-CM

## 2021-01-01 DIAGNOSIS — Z01.818 PREOPERATIVE TESTING: ICD-10-CM

## 2021-01-01 DIAGNOSIS — D63.8 ANEMIA OF CHRONIC DISEASE: ICD-10-CM

## 2021-01-01 DIAGNOSIS — Z99.2 ANEMIA DUE TO CHRONIC KIDNEY DISEASE, ON CHRONIC DIALYSIS: ICD-10-CM

## 2021-01-01 DIAGNOSIS — R53.83 FATIGUE: ICD-10-CM

## 2021-01-01 DIAGNOSIS — N18.4 CHRONIC KIDNEY DISEASE, STAGE IV (SEVERE): ICD-10-CM

## 2021-01-01 DIAGNOSIS — Z86.010 HISTORY OF COLON POLYPS: Primary | ICD-10-CM

## 2021-01-01 DIAGNOSIS — R06.02 SOB (SHORTNESS OF BREATH) ON EXERTION: ICD-10-CM

## 2021-01-01 DIAGNOSIS — E87.29 HYPERCHLOREMIC METABOLIC ACIDOSIS: ICD-10-CM

## 2021-01-01 DIAGNOSIS — K51.20 ULCERATIVE (CHRONIC) PROCTITIS WITHOUT COMPLICATIONS: ICD-10-CM

## 2021-01-01 DIAGNOSIS — E78.5 DYSLIPIDEMIA: ICD-10-CM

## 2021-01-01 DIAGNOSIS — R26.2 DIFFICULTY WALKING: Primary | ICD-10-CM

## 2021-01-01 DIAGNOSIS — E86.0 DEHYDRATION: ICD-10-CM

## 2021-01-01 DIAGNOSIS — R53.83 OTHER FATIGUE: ICD-10-CM

## 2021-01-01 DIAGNOSIS — F41.9 ANXIETY: Primary | ICD-10-CM

## 2021-01-01 DIAGNOSIS — T85.611A PD CATHETER DYSFUNCTION: Primary | ICD-10-CM

## 2021-01-01 DIAGNOSIS — R79.89 ELEVATED TROPONIN: ICD-10-CM

## 2021-01-01 DIAGNOSIS — Z20.822 ENCOUNTER FOR LABORATORY TESTING FOR COVID-19 VIRUS: ICD-10-CM

## 2021-01-01 DIAGNOSIS — D50.9 IRON DEFICIENCY ANEMIA, UNSPECIFIED IRON DEFICIENCY ANEMIA TYPE: ICD-10-CM

## 2021-01-01 DIAGNOSIS — N17.9 AKI (ACUTE KIDNEY INJURY): ICD-10-CM

## 2021-01-01 DIAGNOSIS — R53.81 PHYSICAL DEBILITY: ICD-10-CM

## 2021-01-01 DIAGNOSIS — I63.9 CVA (CEREBRAL VASCULAR ACCIDENT): ICD-10-CM

## 2021-01-01 DIAGNOSIS — N18.6 ESRD (END STAGE RENAL DISEASE): Primary | ICD-10-CM

## 2021-01-01 DIAGNOSIS — I50.9 CONGESTIVE CARDIAC FAILURE: ICD-10-CM

## 2021-01-01 DIAGNOSIS — M79.89 LEG SWELLING: ICD-10-CM

## 2021-01-01 DIAGNOSIS — R41.82 ALTERED MENTAL STATUS: ICD-10-CM

## 2021-01-01 DIAGNOSIS — N18.5 CKD (CHRONIC KIDNEY DISEASE) STAGE 5, GFR LESS THAN 15 ML/MIN: Primary | ICD-10-CM

## 2021-01-01 DIAGNOSIS — I70.0 AORTIC ATHEROSCLEROSIS: ICD-10-CM

## 2021-01-01 DIAGNOSIS — D50.0 ANEMIA DUE TO CHRONIC BLOOD LOSS: ICD-10-CM

## 2021-01-01 DIAGNOSIS — Z79.01 ANTICOAGULANT LONG-TERM USE: ICD-10-CM

## 2021-01-01 DIAGNOSIS — L60.0 INGROWING TOENAIL: ICD-10-CM

## 2021-01-01 DIAGNOSIS — R53.1 GENERAL WEAKNESS: ICD-10-CM

## 2021-01-01 DIAGNOSIS — N39.0 URINARY TRACT INFECTION, SITE NOT SPECIFIED: ICD-10-CM

## 2021-01-01 DIAGNOSIS — N18.32 STAGE 3B CHRONIC KIDNEY DISEASE: ICD-10-CM

## 2021-01-01 LAB
1,25(OH)2D3 SERPL-MCNC: 25 PG/ML (ref 20–79)
25(OH)D3+25(OH)D2 SERPL-MCNC: 15 NG/ML (ref 30–96)
25(OH)D3+25(OH)D2 SERPL-MCNC: 23 NG/ML (ref 30–96)
ABO + RH BLD: NORMAL
ALBUMIN SERPL BCP-MCNC: 1.9 G/DL (ref 3.5–5.2)
ALBUMIN SERPL BCP-MCNC: 2 G/DL (ref 3.5–5.2)
ALBUMIN SERPL BCP-MCNC: 2.1 G/DL (ref 3.5–5.2)
ALBUMIN SERPL BCP-MCNC: 2.1 G/DL (ref 3.5–5.2)
ALBUMIN SERPL BCP-MCNC: 2.2 G/DL (ref 3.5–5.2)
ALBUMIN SERPL BCP-MCNC: 2.3 G/DL (ref 3.5–5.2)
ALBUMIN SERPL BCP-MCNC: 2.4 G/DL (ref 3.5–5.2)
ALBUMIN SERPL BCP-MCNC: 2.5 G/DL (ref 3.5–5.2)
ALBUMIN SERPL BCP-MCNC: 2.6 G/DL (ref 3.5–5.2)
ALBUMIN SERPL BCP-MCNC: 2.7 G/DL (ref 3.5–5.2)
ALBUMIN SERPL BCP-MCNC: 2.8 G/DL (ref 3.5–5.2)
ALBUMIN SERPL BCP-MCNC: 2.9 G/DL (ref 3.5–5.2)
ALBUMIN SERPL BCP-MCNC: 3.1 G/DL (ref 3.5–5.2)
ALBUMIN SERPL BCP-MCNC: 3.2 G/DL (ref 3.5–5.2)
ALBUMIN SERPL BCP-MCNC: 3.3 G/DL (ref 3.5–5.2)
ALBUMIN SERPL BCP-MCNC: 3.4 G/DL (ref 3.5–5.2)
ALBUMIN SERPL BCP-MCNC: 3.4 G/DL (ref 3.5–5.2)
ALBUMIN SERPL BCP-MCNC: 3.5 G/DL (ref 3.5–5.2)
ALBUMIN SERPL BCP-MCNC: 3.6 G/DL (ref 3.5–5.2)
ALLENS TEST: ABNORMAL
ALP SERPL-CCNC: 100 U/L (ref 55–135)
ALP SERPL-CCNC: 113 U/L (ref 55–135)
ALP SERPL-CCNC: 117 U/L (ref 55–135)
ALP SERPL-CCNC: 117 U/L (ref 55–135)
ALP SERPL-CCNC: 119 U/L (ref 55–135)
ALP SERPL-CCNC: 122 U/L (ref 55–135)
ALP SERPL-CCNC: 124 U/L (ref 55–135)
ALP SERPL-CCNC: 125 U/L (ref 55–135)
ALP SERPL-CCNC: 139 U/L (ref 55–135)
ALP SERPL-CCNC: 139 U/L (ref 55–135)
ALP SERPL-CCNC: 146 U/L (ref 55–135)
ALP SERPL-CCNC: 147 U/L (ref 55–135)
ALP SERPL-CCNC: 156 U/L (ref 55–135)
ALP SERPL-CCNC: 167 U/L (ref 55–135)
ALP SERPL-CCNC: 180 U/L (ref 55–135)
ALP SERPL-CCNC: 194 U/L (ref 55–135)
ALP SERPL-CCNC: 196 U/L (ref 55–135)
ALP SERPL-CCNC: 198 U/L (ref 55–135)
ALP SERPL-CCNC: 211 U/L (ref 55–135)
ALP SERPL-CCNC: 243 U/L (ref 55–135)
ALP SERPL-CCNC: 262 U/L (ref 55–135)
ALP SERPL-CCNC: 303 U/L (ref 55–135)
ALP SERPL-CCNC: 57 U/L (ref 55–135)
ALP SERPL-CCNC: 58 U/L (ref 55–135)
ALP SERPL-CCNC: 62 U/L (ref 55–135)
ALP SERPL-CCNC: 68 U/L (ref 55–135)
ALP SERPL-CCNC: 74 U/L (ref 55–135)
ALP SERPL-CCNC: 77 U/L (ref 55–135)
ALP SERPL-CCNC: 77 U/L (ref 55–135)
ALP SERPL-CCNC: 81 U/L (ref 55–135)
ALP SERPL-CCNC: 83 U/L (ref 55–135)
ALP SERPL-CCNC: 84 U/L (ref 55–135)
ALP SERPL-CCNC: 89 U/L (ref 55–135)
ALP SERPL-CCNC: 90 U/L (ref 55–135)
ALP SERPL-CCNC: 91 U/L (ref 55–135)
ALP SERPL-CCNC: 91 U/L (ref 55–135)
ALP SERPL-CCNC: 94 U/L (ref 55–135)
ALP SERPL-CCNC: 94 U/L (ref 55–135)
ALP SERPL-CCNC: 95 U/L (ref 55–135)
ALP SERPL-CCNC: 95 U/L (ref 55–135)
ALP SERPL-CCNC: 97 U/L (ref 55–135)
ALP SERPL-CCNC: 99 U/L (ref 55–135)
ALT SERPL W/O P-5'-P-CCNC: 10 U/L (ref 10–44)
ALT SERPL W/O P-5'-P-CCNC: 10 U/L (ref 10–44)
ALT SERPL W/O P-5'-P-CCNC: 11 U/L (ref 10–44)
ALT SERPL W/O P-5'-P-CCNC: 12 U/L (ref 10–44)
ALT SERPL W/O P-5'-P-CCNC: 13 U/L (ref 10–44)
ALT SERPL W/O P-5'-P-CCNC: 14 U/L (ref 10–44)
ALT SERPL W/O P-5'-P-CCNC: 16 U/L (ref 10–44)
ALT SERPL W/O P-5'-P-CCNC: 17 U/L (ref 10–44)
ALT SERPL W/O P-5'-P-CCNC: 17 U/L (ref 10–44)
ALT SERPL W/O P-5'-P-CCNC: 18 U/L (ref 10–44)
ALT SERPL W/O P-5'-P-CCNC: 18 U/L (ref 10–44)
ALT SERPL W/O P-5'-P-CCNC: 22 U/L (ref 10–44)
ALT SERPL W/O P-5'-P-CCNC: 23 U/L (ref 10–44)
ALT SERPL W/O P-5'-P-CCNC: 23 U/L (ref 10–44)
ALT SERPL W/O P-5'-P-CCNC: 37 U/L (ref 10–44)
ALT SERPL W/O P-5'-P-CCNC: 39 U/L (ref 10–44)
ALT SERPL W/O P-5'-P-CCNC: 5 U/L (ref 10–44)
ALT SERPL W/O P-5'-P-CCNC: 51 U/L (ref 10–44)
ALT SERPL W/O P-5'-P-CCNC: 6 U/L (ref 10–44)
ALT SERPL W/O P-5'-P-CCNC: 8 U/L (ref 10–44)
ALT SERPL W/O P-5'-P-CCNC: 9 U/L (ref 10–44)
ALT SERPL W/O P-5'-P-CCNC: <5 U/L (ref 10–44)
ANION GAP SERPL CALC-SCNC: 10 MMOL/L (ref 8–16)
ANION GAP SERPL CALC-SCNC: 11 MMOL/L (ref 8–16)
ANION GAP SERPL CALC-SCNC: 12 MMOL/L (ref 8–16)
ANION GAP SERPL CALC-SCNC: 13 MMOL/L (ref 8–16)
ANION GAP SERPL CALC-SCNC: 14 MMOL/L (ref 8–16)
ANION GAP SERPL CALC-SCNC: 15 MMOL/L (ref 8–16)
ANION GAP SERPL CALC-SCNC: 16 MMOL/L (ref 8–16)
ANION GAP SERPL CALC-SCNC: 17 MMOL/L (ref 8–16)
ANION GAP SERPL CALC-SCNC: 18 MMOL/L (ref 8–16)
ANION GAP SERPL CALC-SCNC: 19 MMOL/L (ref 8–16)
ANION GAP SERPL CALC-SCNC: 5 MMOL/L (ref 8–16)
ANION GAP SERPL CALC-SCNC: 5 MMOL/L (ref 8–16)
ANION GAP SERPL CALC-SCNC: 6 MMOL/L (ref 8–16)
ANION GAP SERPL CALC-SCNC: 7 MMOL/L (ref 8–16)
ANION GAP SERPL CALC-SCNC: 8 MMOL/L (ref 8–16)
ANION GAP SERPL CALC-SCNC: 9 MMOL/L (ref 8–16)
ANISOCYTOSIS BLD QL SMEAR: SLIGHT
APPEARANCE FLD: CLEAR
APPEARANCE FLD: CLEAR
APTT BLDCRRT: 22.8 SEC (ref 21–32)
ASCENDING AORTA: 3.52 CM
ASCENDING AORTA: 3.64 CM
AST SERPL-CCNC: 139 U/L (ref 10–40)
AST SERPL-CCNC: 17 U/L (ref 10–40)
AST SERPL-CCNC: 17 U/L (ref 10–40)
AST SERPL-CCNC: 18 U/L (ref 10–40)
AST SERPL-CCNC: 19 U/L (ref 10–40)
AST SERPL-CCNC: 20 U/L (ref 10–40)
AST SERPL-CCNC: 21 U/L (ref 10–40)
AST SERPL-CCNC: 22 U/L (ref 10–40)
AST SERPL-CCNC: 22 U/L (ref 10–40)
AST SERPL-CCNC: 23 U/L (ref 10–40)
AST SERPL-CCNC: 23 U/L (ref 10–40)
AST SERPL-CCNC: 24 U/L (ref 10–40)
AST SERPL-CCNC: 25 U/L (ref 10–40)
AST SERPL-CCNC: 25 U/L (ref 10–40)
AST SERPL-CCNC: 27 U/L (ref 10–40)
AST SERPL-CCNC: 27 U/L (ref 10–40)
AST SERPL-CCNC: 28 U/L (ref 10–40)
AST SERPL-CCNC: 28 U/L (ref 10–40)
AST SERPL-CCNC: 29 U/L (ref 10–40)
AST SERPL-CCNC: 30 U/L (ref 10–40)
AST SERPL-CCNC: 30 U/L (ref 10–40)
AST SERPL-CCNC: 31 U/L (ref 10–40)
AST SERPL-CCNC: 31 U/L (ref 10–40)
AST SERPL-CCNC: 34 U/L (ref 10–40)
AST SERPL-CCNC: 34 U/L (ref 10–40)
AST SERPL-CCNC: 37 U/L (ref 10–40)
AST SERPL-CCNC: 38 U/L (ref 10–40)
AST SERPL-CCNC: 39 U/L (ref 10–40)
AST SERPL-CCNC: 41 U/L (ref 10–40)
AST SERPL-CCNC: 42 U/L (ref 10–40)
AST SERPL-CCNC: 44 U/L (ref 10–40)
AST SERPL-CCNC: 44 U/L (ref 10–40)
AST SERPL-CCNC: 57 U/L (ref 10–40)
AST SERPL-CCNC: 72 U/L (ref 10–40)
AV INDEX (PROSTH): 0.82
AV INDEX (PROSTH): 0.82
AV MEAN GRADIENT: 3 MMHG
AV MEAN GRADIENT: 5 MMHG
AV PEAK GRADIENT: 5 MMHG
AV PEAK GRADIENT: 8 MMHG
AV VALVE AREA: 2.94 CM2
AV VALVE AREA: 3.25 CM2
AV VELOCITY RATIO: 0.74
AV VELOCITY RATIO: 0.85
B-OH-BUTYR BLD STRIP-SCNC: 0.1 MMOL/L (ref 0–0.5)
BACTERIA #/AREA URNS AUTO: ABNORMAL /HPF
BACTERIA #/AREA URNS AUTO: ABNORMAL /HPF
BACTERIA #/AREA URNS HPF: ABNORMAL /HPF
BACTERIA BLD CULT: NORMAL
BACTERIA BLD CULT: NORMAL
BACTERIA FLD CULT: NORMAL
BACTERIA FLD CULT: NORMAL
BACTERIA SPEC AEROBE CULT: NO GROWTH
BACTERIA SPEC AEROBE CULT: NORMAL
BACTERIA SPEC AEROBE CULT: NORMAL
BACTERIA SPEC ANAEROBE CULT: NORMAL
BACTERIA UR CULT: ABNORMAL
BACTERIA UR CULT: ABNORMAL
BACTERIA UR CULT: NORMAL
BASOPHILS # BLD AUTO: 0.02 K/UL (ref 0–0.2)
BASOPHILS # BLD AUTO: 0.03 K/UL (ref 0–0.2)
BASOPHILS # BLD AUTO: 0.04 K/UL (ref 0–0.2)
BASOPHILS # BLD AUTO: 0.05 K/UL (ref 0–0.2)
BASOPHILS # BLD AUTO: 0.06 K/UL (ref 0–0.2)
BASOPHILS # BLD AUTO: 0.07 K/UL (ref 0–0.2)
BASOPHILS # BLD AUTO: 0.08 K/UL (ref 0–0.2)
BASOPHILS NFR BLD: 0.1 % (ref 0–1.9)
BASOPHILS NFR BLD: 0.1 % (ref 0–1.9)
BASOPHILS NFR BLD: 0.2 % (ref 0–1.9)
BASOPHILS NFR BLD: 0.3 % (ref 0–1.9)
BASOPHILS NFR BLD: 0.4 % (ref 0–1.9)
BASOPHILS NFR BLD: 0.5 % (ref 0–1.9)
BASOPHILS NFR BLD: 0.6 % (ref 0–1.9)
BASOPHILS NFR BLD: 0.7 % (ref 0–1.9)
BASOPHILS NFR BLD: 0.8 % (ref 0–1.9)
BASOPHILS NFR BLD: 0.9 % (ref 0–1.9)
BASOPHILS NFR BLD: 0.9 % (ref 0–1.9)
BASOPHILS NFR BLD: 1 % (ref 0–1.9)
BASOPHILS NFR FLD MANUAL: 1 %
BILIRUB SERPL-MCNC: 0.2 MG/DL (ref 0.1–1)
BILIRUB SERPL-MCNC: 0.3 MG/DL (ref 0.1–1)
BILIRUB SERPL-MCNC: 0.4 MG/DL (ref 0.1–1)
BILIRUB SERPL-MCNC: 0.5 MG/DL (ref 0.1–1)
BILIRUB SERPL-MCNC: 0.6 MG/DL (ref 0.1–1)
BILIRUB UR QL STRIP: NEGATIVE
BLD GP AB SCN CELLS X3 SERPL QL: NORMAL
BLD PROD TYP BPU: NORMAL
BLD PROD TYP BPU: NORMAL
BLOOD UNIT EXPIRATION DATE: NORMAL
BLOOD UNIT EXPIRATION DATE: NORMAL
BLOOD UNIT TYPE CODE: 8400
BLOOD UNIT TYPE CODE: 9500
BLOOD UNIT TYPE: NORMAL
BLOOD UNIT TYPE: NORMAL
BNP SERPL-MCNC: 264 PG/ML (ref 0–99)
BNP SERPL-MCNC: 43 PG/ML (ref 0–99)
BNP SERPL-MCNC: 952 PG/ML (ref 0–99)
BODY FLD TYPE: NORMAL
BODY FLD TYPE: NORMAL
BSA FOR ECHO PROCEDURE: 1.68 M2
BSA FOR ECHO PROCEDURE: 1.78 M2
BUN SERPL-MCNC: 100 MG/DL (ref 8–23)
BUN SERPL-MCNC: 34 MG/DL (ref 8–23)
BUN SERPL-MCNC: 36 MG/DL (ref 8–23)
BUN SERPL-MCNC: 37 MG/DL (ref 8–23)
BUN SERPL-MCNC: 39 MG/DL (ref 8–23)
BUN SERPL-MCNC: 40 MG/DL (ref 8–23)
BUN SERPL-MCNC: 47 MG/DL (ref 8–23)
BUN SERPL-MCNC: 48 MG/DL (ref 8–23)
BUN SERPL-MCNC: 49 MG/DL (ref 8–23)
BUN SERPL-MCNC: 50 MG/DL (ref 8–23)
BUN SERPL-MCNC: 52 MG/DL (ref 8–23)
BUN SERPL-MCNC: 53 MG/DL (ref 8–23)
BUN SERPL-MCNC: 56 MG/DL (ref 8–23)
BUN SERPL-MCNC: 56 MG/DL (ref 8–23)
BUN SERPL-MCNC: 58 MG/DL (ref 8–23)
BUN SERPL-MCNC: 59 MG/DL (ref 8–23)
BUN SERPL-MCNC: 60 MG/DL (ref 8–23)
BUN SERPL-MCNC: 61 MG/DL (ref 8–23)
BUN SERPL-MCNC: 62 MG/DL (ref 8–23)
BUN SERPL-MCNC: 62 MG/DL (ref 8–23)
BUN SERPL-MCNC: 63 MG/DL (ref 8–23)
BUN SERPL-MCNC: 64 MG/DL (ref 8–23)
BUN SERPL-MCNC: 66 MG/DL (ref 8–23)
BUN SERPL-MCNC: 66 MG/DL (ref 8–23)
BUN SERPL-MCNC: 67 MG/DL (ref 8–23)
BUN SERPL-MCNC: 70 MG/DL (ref 6–30)
BUN SERPL-MCNC: 70 MG/DL (ref 8–23)
BUN SERPL-MCNC: 70 MG/DL (ref 8–23)
BUN SERPL-MCNC: 71 MG/DL (ref 8–23)
BUN SERPL-MCNC: 71 MG/DL (ref 8–23)
BUN SERPL-MCNC: 72 MG/DL (ref 8–23)
BUN SERPL-MCNC: 73 MG/DL (ref 8–23)
BUN SERPL-MCNC: 74 MG/DL (ref 8–23)
BUN SERPL-MCNC: 76 MG/DL (ref 8–23)
BUN SERPL-MCNC: 77 MG/DL (ref 8–23)
BUN SERPL-MCNC: 78 MG/DL (ref 8–23)
BUN SERPL-MCNC: 83 MG/DL (ref 8–23)
BUN SERPL-MCNC: 83 MG/DL (ref 8–23)
BUN SERPL-MCNC: 85 MG/DL (ref 6–30)
BUN SERPL-MCNC: 86 MG/DL (ref 8–23)
BUN SERPL-MCNC: 89 MG/DL (ref 8–23)
BUN SERPL-MCNC: 89 MG/DL (ref 8–23)
BUN SERPL-MCNC: 90 MG/DL (ref 8–23)
BUN SERPL-MCNC: 91 MG/DL (ref 8–23)
BUN SERPL-MCNC: 94 MG/DL (ref 8–23)
BUN SERPL-MCNC: 94 MG/DL (ref 8–23)
BUN SERPL-MCNC: 95 MG/DL (ref 6–30)
BUN SERPL-MCNC: 95 MG/DL (ref 8–23)
CA-I BLDV-SCNC: 1.59 MMOL/L (ref 1.06–1.42)
CALCIUM SERPL-MCNC: 10 MG/DL (ref 8.7–10.5)
CALCIUM SERPL-MCNC: 10.1 MG/DL (ref 8.7–10.5)
CALCIUM SERPL-MCNC: 10.2 MG/DL (ref 8.7–10.5)
CALCIUM SERPL-MCNC: 10.3 MG/DL (ref 8.7–10.5)
CALCIUM SERPL-MCNC: 10.3 MG/DL (ref 8.7–10.5)
CALCIUM SERPL-MCNC: 10.4 MG/DL (ref 8.7–10.5)
CALCIUM SERPL-MCNC: 10.5 MG/DL (ref 8.7–10.5)
CALCIUM SERPL-MCNC: 10.6 MG/DL (ref 8.7–10.5)
CALCIUM SERPL-MCNC: 10.7 MG/DL (ref 8.7–10.5)
CALCIUM SERPL-MCNC: 10.7 MG/DL (ref 8.7–10.5)
CALCIUM SERPL-MCNC: 10.9 MG/DL (ref 8.7–10.5)
CALCIUM SERPL-MCNC: 11.1 MG/DL (ref 8.7–10.5)
CALCIUM SERPL-MCNC: 11.4 MG/DL (ref 8.7–10.5)
CALCIUM SERPL-MCNC: 12 MG/DL (ref 8.7–10.5)
CALCIUM SERPL-MCNC: 12 MG/DL (ref 8.7–10.5)
CALCIUM SERPL-MCNC: 12.2 MG/DL (ref 8.7–10.5)
CALCIUM SERPL-MCNC: 12.4 MG/DL (ref 8.7–10.5)
CALCIUM SERPL-MCNC: 12.6 MG/DL (ref 8.7–10.5)
CALCIUM SERPL-MCNC: 12.8 MG/DL (ref 8.7–10.5)
CALCIUM SERPL-MCNC: 13.1 MG/DL (ref 8.7–10.5)
CALCIUM SERPL-MCNC: 13.1 MG/DL (ref 8.7–10.5)
CALCIUM SERPL-MCNC: 13.5 MG/DL (ref 8.7–10.5)
CALCIUM SERPL-MCNC: 13.5 MG/DL (ref 8.7–10.5)
CALCIUM SERPL-MCNC: 13.7 MG/DL (ref 8.7–10.5)
CALCIUM SERPL-MCNC: 7.3 MG/DL (ref 8.7–10.5)
CALCIUM SERPL-MCNC: 7.5 MG/DL (ref 8.7–10.5)
CALCIUM SERPL-MCNC: 7.6 MG/DL (ref 8.7–10.5)
CALCIUM SERPL-MCNC: 7.7 MG/DL (ref 8.7–10.5)
CALCIUM SERPL-MCNC: 7.7 MG/DL (ref 8.7–10.5)
CALCIUM SERPL-MCNC: 7.8 MG/DL (ref 8.7–10.5)
CALCIUM SERPL-MCNC: 7.8 MG/DL (ref 8.7–10.5)
CALCIUM SERPL-MCNC: 8 MG/DL (ref 8.7–10.5)
CALCIUM SERPL-MCNC: 8 MG/DL (ref 8.7–10.5)
CALCIUM SERPL-MCNC: 8.1 MG/DL (ref 8.7–10.5)
CALCIUM SERPL-MCNC: 8.1 MG/DL (ref 8.7–10.5)
CALCIUM SERPL-MCNC: 8.2 MG/DL (ref 8.7–10.5)
CALCIUM SERPL-MCNC: 8.3 MG/DL (ref 8.7–10.5)
CALCIUM SERPL-MCNC: 8.4 MG/DL (ref 8.7–10.5)
CALCIUM SERPL-MCNC: 8.4 MG/DL (ref 8.7–10.5)
CALCIUM SERPL-MCNC: 8.5 MG/DL (ref 8.7–10.5)
CALCIUM SERPL-MCNC: 8.5 MG/DL (ref 8.7–10.5)
CALCIUM SERPL-MCNC: 8.6 MG/DL (ref 8.7–10.5)
CALCIUM SERPL-MCNC: 8.7 MG/DL (ref 8.7–10.5)
CALCIUM SERPL-MCNC: 8.7 MG/DL (ref 8.7–10.5)
CALCIUM SERPL-MCNC: 8.8 MG/DL (ref 8.7–10.5)
CALCIUM SERPL-MCNC: 9 MG/DL (ref 8.7–10.5)
CALCIUM SERPL-MCNC: 9.2 MG/DL (ref 8.7–10.5)
CALCIUM SERPL-MCNC: 9.2 MG/DL (ref 8.7–10.5)
CALCIUM SERPL-MCNC: 9.4 MG/DL (ref 8.7–10.5)
CALCIUM SERPL-MCNC: 9.5 MG/DL (ref 8.7–10.5)
CALCIUM SERPL-MCNC: 9.7 MG/DL (ref 8.7–10.5)
CALCIUM SERPL-MCNC: 9.7 MG/DL (ref 8.7–10.5)
CALCIUM SERPL-MCNC: 9.8 MG/DL (ref 8.7–10.5)
CALCIUM SERPL-MCNC: 9.8 MG/DL (ref 8.7–10.5)
CALCIUM SERPL-MCNC: 9.9 MG/DL (ref 8.7–10.5)
CASPR2-IGG CBA: NEGATIVE
CHLORIDE SERPL-SCNC: 100 MMOL/L (ref 95–110)
CHLORIDE SERPL-SCNC: 101 MMOL/L (ref 95–110)
CHLORIDE SERPL-SCNC: 102 MMOL/L (ref 95–110)
CHLORIDE SERPL-SCNC: 103 MMOL/L (ref 95–110)
CHLORIDE SERPL-SCNC: 104 MMOL/L (ref 95–110)
CHLORIDE SERPL-SCNC: 105 MMOL/L (ref 95–110)
CHLORIDE SERPL-SCNC: 106 MMOL/L (ref 95–110)
CHLORIDE SERPL-SCNC: 107 MMOL/L (ref 95–110)
CHLORIDE SERPL-SCNC: 108 MMOL/L (ref 95–110)
CHLORIDE SERPL-SCNC: 109 MMOL/L (ref 95–110)
CHLORIDE SERPL-SCNC: 110 MMOL/L (ref 95–110)
CHLORIDE SERPL-SCNC: 112 MMOL/L (ref 95–110)
CHLORIDE SERPL-SCNC: 114 MMOL/L (ref 95–110)
CHLORIDE SERPL-SCNC: 115 MMOL/L (ref 95–110)
CHLORIDE SERPL-SCNC: 91 MMOL/L (ref 95–110)
CHLORIDE SERPL-SCNC: 93 MMOL/L (ref 95–110)
CHLORIDE SERPL-SCNC: 94 MMOL/L (ref 95–110)
CHLORIDE SERPL-SCNC: 95 MMOL/L (ref 95–110)
CHLORIDE SERPL-SCNC: 97 MMOL/L (ref 95–110)
CHLORIDE SERPL-SCNC: 98 MMOL/L (ref 95–110)
CHLORIDE SERPL-SCNC: 99 MMOL/L (ref 95–110)
CHLORIDE SERPL-SCNC: 99 MMOL/L (ref 95–110)
CHLORIDE UR-SCNC: 54 MMOL/L (ref 25–200)
CHOLEST SERPL-MCNC: 169 MG/DL (ref 120–199)
CHOLEST/HDLC SERPL: 2.4 {RATIO} (ref 2–5)
CK SERPL-CCNC: 39 U/L (ref 20–180)
CLARITY UR REFRACT.AUTO: ABNORMAL
CLARITY UR REFRACT.AUTO: ABNORMAL
CLARITY UR REFRACT.AUTO: CLEAR
CLARITY UR REFRACT.AUTO: CLEAR
CLARITY UR: CLEAR
CO2 SERPL-SCNC: 10 MMOL/L (ref 23–29)
CO2 SERPL-SCNC: 13 MMOL/L (ref 23–29)
CO2 SERPL-SCNC: 14 MMOL/L (ref 23–29)
CO2 SERPL-SCNC: 14 MMOL/L (ref 23–29)
CO2 SERPL-SCNC: 15 MMOL/L (ref 23–29)
CO2 SERPL-SCNC: 17 MMOL/L (ref 23–29)
CO2 SERPL-SCNC: 18 MMOL/L (ref 23–29)
CO2 SERPL-SCNC: 19 MMOL/L (ref 23–29)
CO2 SERPL-SCNC: 20 MMOL/L (ref 23–29)
CO2 SERPL-SCNC: 21 MMOL/L (ref 23–29)
CO2 SERPL-SCNC: 22 MMOL/L (ref 23–29)
CO2 SERPL-SCNC: 23 MMOL/L (ref 23–29)
CO2 SERPL-SCNC: 24 MMOL/L (ref 23–29)
CO2 SERPL-SCNC: 24 MMOL/L (ref 23–29)
CO2 SERPL-SCNC: 25 MMOL/L (ref 23–29)
CO2 SERPL-SCNC: 26 MMOL/L (ref 23–29)
CO2 SERPL-SCNC: 27 MMOL/L (ref 23–29)
CO2 SERPL-SCNC: 28 MMOL/L (ref 23–29)
CODING SYSTEM: NORMAL
CODING SYSTEM: NORMAL
COLOR FLD: COLORLESS
COLOR FLD: YELLOW
COLOR UR AUTO: ABNORMAL
COLOR UR AUTO: ABNORMAL
COLOR UR AUTO: NORMAL
COLOR UR AUTO: YELLOW
COLOR UR: YELLOW
CREAT SERPL-MCNC: 10.1 MG/DL (ref 0.5–1.4)
CREAT SERPL-MCNC: 10.3 MG/DL (ref 0.5–1.4)
CREAT SERPL-MCNC: 10.4 MG/DL (ref 0.5–1.4)
CREAT SERPL-MCNC: 10.6 MG/DL (ref 0.5–1.4)
CREAT SERPL-MCNC: 11 MG/DL (ref 0.5–1.4)
CREAT SERPL-MCNC: 11.1 MG/DL (ref 0.5–1.4)
CREAT SERPL-MCNC: 11.5 MG/DL (ref 0.5–1.4)
CREAT SERPL-MCNC: 11.8 MG/DL (ref 0.5–1.4)
CREAT SERPL-MCNC: 12.5 MG/DL (ref 0.5–1.4)
CREAT SERPL-MCNC: 13.1 MG/DL (ref 0.5–1.4)
CREAT SERPL-MCNC: 4 MG/DL (ref 0.5–1.4)
CREAT SERPL-MCNC: 4 MG/DL (ref 0.5–1.4)
CREAT SERPL-MCNC: 4.1 MG/DL (ref 0.5–1.4)
CREAT SERPL-MCNC: 4.2 MG/DL (ref 0.5–1.4)
CREAT SERPL-MCNC: 4.3 MG/DL (ref 0.5–1.4)
CREAT SERPL-MCNC: 4.3 MG/DL (ref 0.5–1.4)
CREAT SERPL-MCNC: 4.4 MG/DL (ref 0.5–1.4)
CREAT SERPL-MCNC: 4.6 MG/DL (ref 0.5–1.4)
CREAT SERPL-MCNC: 4.6 MG/DL (ref 0.5–1.4)
CREAT SERPL-MCNC: 4.7 MG/DL (ref 0.5–1.4)
CREAT SERPL-MCNC: 4.7 MG/DL (ref 0.5–1.4)
CREAT SERPL-MCNC: 4.8 MG/DL (ref 0.5–1.4)
CREAT SERPL-MCNC: 4.9 MG/DL (ref 0.5–1.4)
CREAT SERPL-MCNC: 5 MG/DL (ref 0.5–1.4)
CREAT SERPL-MCNC: 5.1 MG/DL (ref 0.5–1.4)
CREAT SERPL-MCNC: 5.5 MG/DL (ref 0.5–1.4)
CREAT SERPL-MCNC: 5.6 MG/DL (ref 0.5–1.4)
CREAT SERPL-MCNC: 5.7 MG/DL (ref 0.5–1.4)
CREAT SERPL-MCNC: 5.8 MG/DL (ref 0.5–1.4)
CREAT SERPL-MCNC: 5.8 MG/DL (ref 0.5–1.4)
CREAT SERPL-MCNC: 5.9 MG/DL (ref 0.5–1.4)
CREAT SERPL-MCNC: 6.6 MG/DL (ref 0.5–1.4)
CREAT SERPL-MCNC: 6.7 MG/DL (ref 0.5–1.4)
CREAT SERPL-MCNC: 6.8 MG/DL (ref 0.5–1.4)
CREAT SERPL-MCNC: 6.9 MG/DL (ref 0.5–1.4)
CREAT SERPL-MCNC: 7.1 MG/DL (ref 0.5–1.4)
CREAT SERPL-MCNC: 7.2 MG/DL (ref 0.5–1.4)
CREAT SERPL-MCNC: 7.5 MG/DL (ref 0.5–1.4)
CREAT SERPL-MCNC: 7.6 MG/DL (ref 0.5–1.4)
CREAT SERPL-MCNC: 7.6 MG/DL (ref 0.5–1.4)
CREAT SERPL-MCNC: 8 MG/DL (ref 0.5–1.4)
CREAT SERPL-MCNC: 8.6 MG/DL (ref 0.5–1.4)
CREAT SERPL-MCNC: 8.8 MG/DL (ref 0.5–1.4)
CREAT SERPL-MCNC: 9.2 MG/DL (ref 0.5–1.4)
CREAT SERPL-MCNC: 9.2 MG/DL (ref 0.5–1.4)
CREAT SERPL-MCNC: 9.7 MG/DL (ref 0.5–1.4)
CREAT SERPL-MCNC: 9.9 MG/DL (ref 0.5–1.4)
CREAT UR-MCNC: 39 MG/DL (ref 15–325)
CTP QC/QA: YES
CV ECHO LV RWT: 0.39 CM
CV ECHO LV RWT: 0.41 CM
DELSYS: ABNORMAL
DIFFERENTIAL METHOD: ABNORMAL
DISPENSE STATUS: NORMAL
DISPENSE STATUS: NORMAL
DOP CALC AO PEAK VEL: 1.17 M/S
DOP CALC AO PEAK VEL: 1.45 M/S
DOP CALC AO VTI: 25.41 CM
DOP CALC AO VTI: 26.29 CM
DOP CALC LVOT AREA: 3.6 CM2
DOP CALC LVOT AREA: 3.9 CM2
DOP CALC LVOT DIAMETER: 2.14 CM
DOP CALC LVOT DIAMETER: 2.24 CM
DOP CALC LVOT PEAK VEL: 0.99 M/S
DOP CALC LVOT PEAK VEL: 1.07 M/S
DOP CALC LVOT STROKE VOLUME: 74.6 CM3
DOP CALC LVOT STROKE VOLUME: 85.35 CM3
DOP CALCLVOT PEAK VEL VTI: 20.75 CM
DOP CALCLVOT PEAK VEL VTI: 21.67 CM
E WAVE DECELERATION TIME: 231.85 MSEC
E WAVE DECELERATION TIME: 259.15 MSEC
E/A RATIO: 0.37
E/A RATIO: 0.44
E/E' RATIO: 7.85 M/S
E/E' RATIO: 8.31 M/S
ECHO LV POSTERIOR WALL: 0.86 CM (ref 0.6–1.1)
ECHO LV POSTERIOR WALL: 0.88 CM (ref 0.6–1.1)
EJECTION FRACTION: 50 %
EOSINOPHIL # BLD AUTO: 0 K/UL (ref 0–0.5)
EOSINOPHIL # BLD AUTO: 0.1 K/UL (ref 0–0.5)
EOSINOPHIL # BLD AUTO: 0.2 K/UL (ref 0–0.5)
EOSINOPHIL # BLD AUTO: 0.3 K/UL (ref 0–0.5)
EOSINOPHIL # BLD AUTO: 0.4 K/UL (ref 0–0.5)
EOSINOPHIL # BLD AUTO: 0.5 K/UL (ref 0–0.5)
EOSINOPHIL # BLD AUTO: 0.6 K/UL (ref 0–0.5)
EOSINOPHIL # BLD AUTO: 0.6 K/UL (ref 0–0.5)
EOSINOPHIL # BLD AUTO: 0.8 K/UL (ref 0–0.5)
EOSINOPHIL # BLD AUTO: 0.9 K/UL (ref 0–0.5)
EOSINOPHIL # BLD AUTO: 1.2 K/UL (ref 0–0.5)
EOSINOPHIL # BLD AUTO: 1.2 K/UL (ref 0–0.5)
EOSINOPHIL # BLD AUTO: 1.3 K/UL (ref 0–0.5)
EOSINOPHIL NFR BLD: 0 % (ref 0–8)
EOSINOPHIL NFR BLD: 0.1 % (ref 0–8)
EOSINOPHIL NFR BLD: 0.2 % (ref 0–8)
EOSINOPHIL NFR BLD: 0.4 % (ref 0–8)
EOSINOPHIL NFR BLD: 0.5 % (ref 0–8)
EOSINOPHIL NFR BLD: 0.8 % (ref 0–8)
EOSINOPHIL NFR BLD: 1.1 % (ref 0–8)
EOSINOPHIL NFR BLD: 1.1 % (ref 0–8)
EOSINOPHIL NFR BLD: 1.2 % (ref 0–8)
EOSINOPHIL NFR BLD: 1.2 % (ref 0–8)
EOSINOPHIL NFR BLD: 1.5 % (ref 0–8)
EOSINOPHIL NFR BLD: 1.6 % (ref 0–8)
EOSINOPHIL NFR BLD: 1.8 % (ref 0–8)
EOSINOPHIL NFR BLD: 10 % (ref 0–8)
EOSINOPHIL NFR BLD: 11.3 % (ref 0–8)
EOSINOPHIL NFR BLD: 12.4 % (ref 0–8)
EOSINOPHIL NFR BLD: 13.2 % (ref 0–8)
EOSINOPHIL NFR BLD: 13.4 % (ref 0–8)
EOSINOPHIL NFR BLD: 13.7 % (ref 0–8)
EOSINOPHIL NFR BLD: 13.9 % (ref 0–8)
EOSINOPHIL NFR BLD: 16.5 % (ref 0–8)
EOSINOPHIL NFR BLD: 16.7 % (ref 0–8)
EOSINOPHIL NFR BLD: 17.8 % (ref 0–8)
EOSINOPHIL NFR BLD: 2.1 % (ref 0–8)
EOSINOPHIL NFR BLD: 2.2 % (ref 0–8)
EOSINOPHIL NFR BLD: 2.3 % (ref 0–8)
EOSINOPHIL NFR BLD: 2.8 % (ref 0–8)
EOSINOPHIL NFR BLD: 2.8 % (ref 0–8)
EOSINOPHIL NFR BLD: 3 % (ref 0–8)
EOSINOPHIL NFR BLD: 3.1 % (ref 0–8)
EOSINOPHIL NFR BLD: 3.1 % (ref 0–8)
EOSINOPHIL NFR BLD: 3.4 % (ref 0–8)
EOSINOPHIL NFR BLD: 3.6 % (ref 0–8)
EOSINOPHIL NFR BLD: 4.3 % (ref 0–8)
EOSINOPHIL NFR BLD: 4.9 % (ref 0–8)
EOSINOPHIL NFR BLD: 4.9 % (ref 0–8)
EOSINOPHIL NFR BLD: 5 % (ref 0–8)
EOSINOPHIL NFR BLD: 5 % (ref 0–8)
EOSINOPHIL NFR BLD: 5.1 % (ref 0–8)
EOSINOPHIL NFR BLD: 5.2 % (ref 0–8)
EOSINOPHIL NFR BLD: 5.4 % (ref 0–8)
EOSINOPHIL NFR BLD: 5.4 % (ref 0–8)
EOSINOPHIL NFR BLD: 6.6 % (ref 0–8)
EOSINOPHIL NFR BLD: 7.2 % (ref 0–8)
EOSINOPHIL NFR BLD: 8.1 % (ref 0–8)
EOSINOPHIL NFR BLD: 9 % (ref 0–8)
EOSINOPHIL NFR BLD: 9.3 % (ref 0–8)
EOSINOPHIL NFR FLD MANUAL: 81 %
EOSINOPHIL NFR FLD MANUAL: 89 %
ERYTHROCYTE [DISTWIDTH] IN BLOOD BY AUTOMATED COUNT: 12.2 % (ref 11.5–14.5)
ERYTHROCYTE [DISTWIDTH] IN BLOOD BY AUTOMATED COUNT: 12.3 % (ref 11.5–14.5)
ERYTHROCYTE [DISTWIDTH] IN BLOOD BY AUTOMATED COUNT: 12.3 % (ref 11.5–14.5)
ERYTHROCYTE [DISTWIDTH] IN BLOOD BY AUTOMATED COUNT: 12.4 % (ref 11.5–14.5)
ERYTHROCYTE [DISTWIDTH] IN BLOOD BY AUTOMATED COUNT: 12.5 % (ref 11.5–14.5)
ERYTHROCYTE [DISTWIDTH] IN BLOOD BY AUTOMATED COUNT: 12.6 % (ref 11.5–14.5)
ERYTHROCYTE [DISTWIDTH] IN BLOOD BY AUTOMATED COUNT: 12.7 % (ref 11.5–14.5)
ERYTHROCYTE [DISTWIDTH] IN BLOOD BY AUTOMATED COUNT: 12.8 % (ref 11.5–14.5)
ERYTHROCYTE [DISTWIDTH] IN BLOOD BY AUTOMATED COUNT: 12.9 % (ref 11.5–14.5)
ERYTHROCYTE [DISTWIDTH] IN BLOOD BY AUTOMATED COUNT: 12.9 % (ref 11.5–14.5)
ERYTHROCYTE [DISTWIDTH] IN BLOOD BY AUTOMATED COUNT: 13 % (ref 11.5–14.5)
ERYTHROCYTE [DISTWIDTH] IN BLOOD BY AUTOMATED COUNT: 13.1 % (ref 11.5–14.5)
ERYTHROCYTE [DISTWIDTH] IN BLOOD BY AUTOMATED COUNT: 13.4 % (ref 11.5–14.5)
ERYTHROCYTE [DISTWIDTH] IN BLOOD BY AUTOMATED COUNT: 13.4 % (ref 11.5–14.5)
ERYTHROCYTE [DISTWIDTH] IN BLOOD BY AUTOMATED COUNT: 13.5 % (ref 11.5–14.5)
ERYTHROCYTE [DISTWIDTH] IN BLOOD BY AUTOMATED COUNT: 13.6 % (ref 11.5–14.5)
ERYTHROCYTE [DISTWIDTH] IN BLOOD BY AUTOMATED COUNT: 13.7 % (ref 11.5–14.5)
ERYTHROCYTE [DISTWIDTH] IN BLOOD BY AUTOMATED COUNT: 13.8 % (ref 11.5–14.5)
ERYTHROCYTE [DISTWIDTH] IN BLOOD BY AUTOMATED COUNT: 13.8 % (ref 11.5–14.5)
ERYTHROCYTE [DISTWIDTH] IN BLOOD BY AUTOMATED COUNT: 13.9 % (ref 11.5–14.5)
ERYTHROCYTE [DISTWIDTH] IN BLOOD BY AUTOMATED COUNT: 13.9 % (ref 11.5–14.5)
ERYTHROCYTE [DISTWIDTH] IN BLOOD BY AUTOMATED COUNT: 14 % (ref 11.5–14.5)
ERYTHROCYTE [DISTWIDTH] IN BLOOD BY AUTOMATED COUNT: 14.1 % (ref 11.5–14.5)
ERYTHROCYTE [DISTWIDTH] IN BLOOD BY AUTOMATED COUNT: 14.2 % (ref 11.5–14.5)
ERYTHROCYTE [DISTWIDTH] IN BLOOD BY AUTOMATED COUNT: 14.2 % (ref 11.5–14.5)
ERYTHROCYTE [DISTWIDTH] IN BLOOD BY AUTOMATED COUNT: 14.4 % (ref 11.5–14.5)
ERYTHROCYTE [DISTWIDTH] IN BLOOD BY AUTOMATED COUNT: 14.5 % (ref 11.5–14.5)
EST. GFR  (AFRICAN AMERICAN): 10 ML/MIN/1.73 M^2
EST. GFR  (AFRICAN AMERICAN): 10 ML/MIN/1.73 M^2
EST. GFR  (AFRICAN AMERICAN): 10.5 ML/MIN/1.73 M^2
EST. GFR  (AFRICAN AMERICAN): 10.8 ML/MIN/1.73 M^2
EST. GFR  (AFRICAN AMERICAN): 10.8 ML/MIN/1.73 M^2
EST. GFR  (AFRICAN AMERICAN): 11.2 ML/MIN/1.73 M^2
EST. GFR  (AFRICAN AMERICAN): 11.5 ML/MIN/1.73 M^2
EST. GFR  (AFRICAN AMERICAN): 11.8 ML/MIN/1.73 M^2
EST. GFR  (AFRICAN AMERICAN): 11.8 ML/MIN/1.73 M^2
EST. GFR  (AFRICAN AMERICAN): 3 ML/MIN/1.73 M^2
EST. GFR  (AFRICAN AMERICAN): 3.2 ML/MIN/1.73 M^2
EST. GFR  (AFRICAN AMERICAN): 3.4 ML/MIN/1.73 M^2
EST. GFR  (AFRICAN AMERICAN): 3.5 ML/MIN/1.73 M^2
EST. GFR  (AFRICAN AMERICAN): 3.6 ML/MIN/1.73 M^2
EST. GFR  (AFRICAN AMERICAN): 3.7 ML/MIN/1.73 M^2
EST. GFR  (AFRICAN AMERICAN): 3.9 ML/MIN/1.73 M^2
EST. GFR  (AFRICAN AMERICAN): 4 ML/MIN/1.73 M^2
EST. GFR  (AFRICAN AMERICAN): 4.1 ML/MIN/1.73 M^2
EST. GFR  (AFRICAN AMERICAN): 4.3 ML/MIN/1.73 M^2
EST. GFR  (AFRICAN AMERICAN): 4.3 ML/MIN/1.73 M^2
EST. GFR  (AFRICAN AMERICAN): 4.6 ML/MIN/1.73 M^2
EST. GFR  (AFRICAN AMERICAN): 4.7 ML/MIN/1.73 M^2
EST. GFR  (AFRICAN AMERICAN): 5.1 ML/MIN/1.73 M^2
EST. GFR  (AFRICAN AMERICAN): 5.4 ML/MIN/1.73 M^2
EST. GFR  (AFRICAN AMERICAN): 5.4 ML/MIN/1.73 M^2
EST. GFR  (AFRICAN AMERICAN): 5.5 ML/MIN/1.73 M^2
EST. GFR  (AFRICAN AMERICAN): 5.8 ML/MIN/1.73 M^2
EST. GFR  (AFRICAN AMERICAN): 5.9 ML/MIN/1.73 M^2
EST. GFR  (AFRICAN AMERICAN): 6.1 ML/MIN/1.73 M^2
EST. GFR  (AFRICAN AMERICAN): 6.2 ML/MIN/1.73 M^2
EST. GFR  (AFRICAN AMERICAN): 6.3 ML/MIN/1.73 M^2
EST. GFR  (AFRICAN AMERICAN): 6.5 ML/MIN/1.73 M^2
EST. GFR  (AFRICAN AMERICAN): 7.4 ML/MIN/1.73 M^2
EST. GFR  (AFRICAN AMERICAN): 7.6 ML/MIN/1.73 M^2
EST. GFR  (AFRICAN AMERICAN): 7.6 ML/MIN/1.73 M^2
EST. GFR  (AFRICAN AMERICAN): 7.7 ML/MIN/1.73 M^2
EST. GFR  (AFRICAN AMERICAN): 7.9 ML/MIN/1.73 M^2
EST. GFR  (AFRICAN AMERICAN): 8.1 ML/MIN/1.73 M^2
EST. GFR  (AFRICAN AMERICAN): 8.8 ML/MIN/1.73 M^2
EST. GFR  (AFRICAN AMERICAN): 9 ML/MIN/1.73 M^2
EST. GFR  (AFRICAN AMERICAN): 9.3 ML/MIN/1.73 M^2
EST. GFR  (AFRICAN AMERICAN): 9.5 ML/MIN/1.73 M^2
EST. GFR  (AFRICAN AMERICAN): 9.7 ML/MIN/1.73 M^2
EST. GFR  (AFRICAN AMERICAN): 9.7 ML/MIN/1.73 M^2
EST. GFR  (NON AFRICAN AMERICAN): 10 ML/MIN/1.73 M^2
EST. GFR  (NON AFRICAN AMERICAN): 10.3 ML/MIN/1.73 M^2
EST. GFR  (NON AFRICAN AMERICAN): 10.3 ML/MIN/1.73 M^2
EST. GFR  (NON AFRICAN AMERICAN): 2.8 ML/MIN/1.73 M^2
EST. GFR  (NON AFRICAN AMERICAN): 3 ML/MIN/1.73 M^2
EST. GFR  (NON AFRICAN AMERICAN): 3.2 ML/MIN/1.73 M^2
EST. GFR  (NON AFRICAN AMERICAN): 3.2 ML/MIN/1.73 M^2
EST. GFR  (NON AFRICAN AMERICAN): 3.3 ML/MIN/1.73 M^2
EST. GFR  (NON AFRICAN AMERICAN): 3.4 ML/MIN/1.73 M^2
EST. GFR  (NON AFRICAN AMERICAN): 3.5 ML/MIN/1.73 M^2
EST. GFR  (NON AFRICAN AMERICAN): 3.7 ML/MIN/1.73 M^2
EST. GFR  (NON AFRICAN AMERICAN): 3.7 ML/MIN/1.73 M^2
EST. GFR  (NON AFRICAN AMERICAN): 4 ML/MIN/1.73 M^2
EST. GFR  (NON AFRICAN AMERICAN): 4.1 ML/MIN/1.73 M^2
EST. GFR  (NON AFRICAN AMERICAN): 4.4 ML/MIN/1.73 M^2
EST. GFR  (NON AFRICAN AMERICAN): 4.7 ML/MIN/1.73 M^2
EST. GFR  (NON AFRICAN AMERICAN): 4.7 ML/MIN/1.73 M^2
EST. GFR  (NON AFRICAN AMERICAN): 4.8 ML/MIN/1.73 M^2
EST. GFR  (NON AFRICAN AMERICAN): 5 ML/MIN/1.73 M^2
EST. GFR  (NON AFRICAN AMERICAN): 5.1 ML/MIN/1.73 M^2
EST. GFR  (NON AFRICAN AMERICAN): 5.3 ML/MIN/1.73 M^2
EST. GFR  (NON AFRICAN AMERICAN): 5.4 ML/MIN/1.73 M^2
EST. GFR  (NON AFRICAN AMERICAN): 5.5 ML/MIN/1.73 M^2
EST. GFR  (NON AFRICAN AMERICAN): 5.6 ML/MIN/1.73 M^2
EST. GFR  (NON AFRICAN AMERICAN): 6.4 ML/MIN/1.73 M^2
EST. GFR  (NON AFRICAN AMERICAN): 6.6 ML/MIN/1.73 M^2
EST. GFR  (NON AFRICAN AMERICAN): 6.6 ML/MIN/1.73 M^2
EST. GFR  (NON AFRICAN AMERICAN): 6.7 ML/MIN/1.73 M^2
EST. GFR  (NON AFRICAN AMERICAN): 6.8 ML/MIN/1.73 M^2
EST. GFR  (NON AFRICAN AMERICAN): 7 ML/MIN/1.73 M^2
EST. GFR  (NON AFRICAN AMERICAN): 7.7 ML/MIN/1.73 M^2
EST. GFR  (NON AFRICAN AMERICAN): 7.8 ML/MIN/1.73 M^2
EST. GFR  (NON AFRICAN AMERICAN): 8 ML/MIN/1.73 M^2
EST. GFR  (NON AFRICAN AMERICAN): 8.2 ML/MIN/1.73 M^2
EST. GFR  (NON AFRICAN AMERICAN): 8.4 ML/MIN/1.73 M^2
EST. GFR  (NON AFRICAN AMERICAN): 8.4 ML/MIN/1.73 M^2
EST. GFR  (NON AFRICAN AMERICAN): 8.7 ML/MIN/1.73 M^2
EST. GFR  (NON AFRICAN AMERICAN): 8.7 ML/MIN/1.73 M^2
EST. GFR  (NON AFRICAN AMERICAN): 9.1 ML/MIN/1.73 M^2
EST. GFR  (NON AFRICAN AMERICAN): 9.4 ML/MIN/1.73 M^2
EST. GFR  (NON AFRICAN AMERICAN): 9.4 ML/MIN/1.73 M^2
EST. GFR  (NON AFRICAN AMERICAN): 9.7 ML/MIN/1.73 M^2
ESTIMATED AVG GLUCOSE: 103 MG/DL (ref 68–131)
FERRITIN SERPL-MCNC: 109 NG/ML (ref 20–300)
FIO2: 40
FLOW: 10
FLOW: 6
FRACTIONAL SHORTENING: 25 % (ref 28–44)
FRACTIONAL SHORTENING: 30 % (ref 28–44)
FUNGUS SPEC CULT: NORMAL
GLUCOSE SERPL-MCNC: 100 MG/DL (ref 70–110)
GLUCOSE SERPL-MCNC: 100 MG/DL (ref 70–110)
GLUCOSE SERPL-MCNC: 102 MG/DL (ref 70–110)
GLUCOSE SERPL-MCNC: 103 MG/DL (ref 70–110)
GLUCOSE SERPL-MCNC: 104 MG/DL (ref 70–110)
GLUCOSE SERPL-MCNC: 105 MG/DL (ref 70–110)
GLUCOSE SERPL-MCNC: 105 MG/DL (ref 70–110)
GLUCOSE SERPL-MCNC: 109 MG/DL (ref 70–110)
GLUCOSE SERPL-MCNC: 111 MG/DL (ref 70–110)
GLUCOSE SERPL-MCNC: 112 MG/DL (ref 70–110)
GLUCOSE SERPL-MCNC: 116 MG/DL (ref 70–110)
GLUCOSE SERPL-MCNC: 117 MG/DL (ref 70–110)
GLUCOSE SERPL-MCNC: 118 MG/DL (ref 70–110)
GLUCOSE SERPL-MCNC: 118 MG/DL (ref 70–110)
GLUCOSE SERPL-MCNC: 122 MG/DL (ref 70–110)
GLUCOSE SERPL-MCNC: 128 MG/DL (ref 70–110)
GLUCOSE SERPL-MCNC: 139 MG/DL (ref 70–110)
GLUCOSE SERPL-MCNC: 140 MG/DL (ref 70–110)
GLUCOSE SERPL-MCNC: 145 MG/DL (ref 70–110)
GLUCOSE SERPL-MCNC: 148 MG/DL (ref 70–110)
GLUCOSE SERPL-MCNC: 158 MG/DL (ref 70–110)
GLUCOSE SERPL-MCNC: 172 MG/DL (ref 70–110)
GLUCOSE SERPL-MCNC: 73 MG/DL (ref 70–110)
GLUCOSE SERPL-MCNC: 75 MG/DL (ref 70–110)
GLUCOSE SERPL-MCNC: 76 MG/DL (ref 70–110)
GLUCOSE SERPL-MCNC: 76 MG/DL (ref 70–110)
GLUCOSE SERPL-MCNC: 78 MG/DL (ref 70–110)
GLUCOSE SERPL-MCNC: 78 MG/DL (ref 70–110)
GLUCOSE SERPL-MCNC: 80 MG/DL (ref 70–110)
GLUCOSE SERPL-MCNC: 83 MG/DL (ref 70–110)
GLUCOSE SERPL-MCNC: 85 MG/DL (ref 70–110)
GLUCOSE SERPL-MCNC: 86 MG/DL (ref 70–110)
GLUCOSE SERPL-MCNC: 87 MG/DL (ref 70–110)
GLUCOSE SERPL-MCNC: 88 MG/DL (ref 70–110)
GLUCOSE SERPL-MCNC: 90 MG/DL (ref 70–110)
GLUCOSE SERPL-MCNC: 91 MG/DL (ref 70–110)
GLUCOSE SERPL-MCNC: 92 MG/DL (ref 70–110)
GLUCOSE SERPL-MCNC: 93 MG/DL (ref 70–110)
GLUCOSE SERPL-MCNC: 93 MG/DL (ref 70–110)
GLUCOSE SERPL-MCNC: 94 MG/DL (ref 70–110)
GLUCOSE SERPL-MCNC: 94 MG/DL (ref 70–110)
GLUCOSE SERPL-MCNC: 95 MG/DL (ref 70–110)
GLUCOSE SERPL-MCNC: 95 MG/DL (ref 70–110)
GLUCOSE SERPL-MCNC: 96 MG/DL (ref 70–110)
GLUCOSE SERPL-MCNC: 97 MG/DL (ref 70–110)
GLUCOSE SERPL-MCNC: 97 MG/DL (ref 70–110)
GLUCOSE SERPL-MCNC: 98 MG/DL (ref 70–110)
GLUCOSE SERPL-MCNC: 98 MG/DL (ref 70–110)
GLUCOSE SERPL-MCNC: 99 MG/DL (ref 70–110)
GLUCOSE SERPL-MCNC: 99 MG/DL (ref 70–110)
GLUCOSE UR QL STRIP: NEGATIVE
GRAM STN SPEC: NORMAL
HAPTOGLOB SERPL-MCNC: 100 MG/DL (ref 30–250)
HAPTOGLOB SERPL-MCNC: 154 MG/DL (ref 30–250)
HBA1C MFR BLD: 5.2 % (ref 4–5.6)
HCO3 UR-SCNC: 13 MMOL/L (ref 24–28)
HCO3 UR-SCNC: 25 MMOL/L (ref 24–28)
HCO3 UR-SCNC: 28.2 MMOL/L (ref 24–28)
HCT VFR BLD AUTO: 18.2 % (ref 37–48.5)
HCT VFR BLD AUTO: 19.3 % (ref 37–48.5)
HCT VFR BLD AUTO: 20 % (ref 37–48.5)
HCT VFR BLD AUTO: 21.5 % (ref 37–48.5)
HCT VFR BLD AUTO: 21.9 % (ref 37–48.5)
HCT VFR BLD AUTO: 22 % (ref 37–48.5)
HCT VFR BLD AUTO: 22.4 % (ref 37–48.5)
HCT VFR BLD AUTO: 22.4 % (ref 37–48.5)
HCT VFR BLD AUTO: 23.2 % (ref 37–48.5)
HCT VFR BLD AUTO: 23.7 % (ref 37–48.5)
HCT VFR BLD AUTO: 24.1 % (ref 37–48.5)
HCT VFR BLD AUTO: 24.2 % (ref 37–48.5)
HCT VFR BLD AUTO: 24.7 % (ref 37–48.5)
HCT VFR BLD AUTO: 24.8 % (ref 37–48.5)
HCT VFR BLD AUTO: 24.8 % (ref 37–48.5)
HCT VFR BLD AUTO: 24.9 % (ref 37–48.5)
HCT VFR BLD AUTO: 25 % (ref 37–48.5)
HCT VFR BLD AUTO: 25.3 % (ref 37–48.5)
HCT VFR BLD AUTO: 25.6 % (ref 37–48.5)
HCT VFR BLD AUTO: 25.7 % (ref 37–48.5)
HCT VFR BLD AUTO: 26 % (ref 37–48.5)
HCT VFR BLD AUTO: 26.1 % (ref 37–48.5)
HCT VFR BLD AUTO: 26.7 % (ref 37–48.5)
HCT VFR BLD AUTO: 26.7 % (ref 37–48.5)
HCT VFR BLD AUTO: 26.9 % (ref 37–48.5)
HCT VFR BLD AUTO: 27 % (ref 37–48.5)
HCT VFR BLD AUTO: 27.1 % (ref 37–48.5)
HCT VFR BLD AUTO: 27.2 % (ref 37–48.5)
HCT VFR BLD AUTO: 27.3 % (ref 37–48.5)
HCT VFR BLD AUTO: 27.4 % (ref 37–48.5)
HCT VFR BLD AUTO: 27.4 % (ref 37–48.5)
HCT VFR BLD AUTO: 27.5 % (ref 37–48.5)
HCT VFR BLD AUTO: 27.7 % (ref 37–48.5)
HCT VFR BLD AUTO: 27.9 % (ref 37–48.5)
HCT VFR BLD AUTO: 27.9 % (ref 37–48.5)
HCT VFR BLD AUTO: 28.4 % (ref 37–48.5)
HCT VFR BLD AUTO: 28.6 % (ref 37–48.5)
HCT VFR BLD AUTO: 28.9 % (ref 37–48.5)
HCT VFR BLD AUTO: 29.3 % (ref 37–48.5)
HCT VFR BLD AUTO: 29.3 % (ref 37–48.5)
HCT VFR BLD AUTO: 29.4 % (ref 37–48.5)
HCT VFR BLD AUTO: 29.5 % (ref 37–48.5)
HCT VFR BLD AUTO: 30 % (ref 37–48.5)
HCT VFR BLD AUTO: 30.2 % (ref 37–48.5)
HCT VFR BLD AUTO: 30.3 % (ref 37–48.5)
HCT VFR BLD AUTO: 30.6 % (ref 37–48.5)
HCT VFR BLD AUTO: 30.8 % (ref 37–48.5)
HCT VFR BLD AUTO: 30.8 % (ref 37–48.5)
HCT VFR BLD AUTO: 30.9 % (ref 37–48.5)
HCT VFR BLD AUTO: 31.2 % (ref 37–48.5)
HCT VFR BLD AUTO: 31.3 % (ref 37–48.5)
HCT VFR BLD AUTO: 32.1 % (ref 37–48.5)
HCT VFR BLD AUTO: 32.5 % (ref 37–48.5)
HCT VFR BLD AUTO: 32.7 % (ref 37–48.5)
HCT VFR BLD CALC: 19 %PCV (ref 36–54)
HCT VFR BLD CALC: 22 %PCV (ref 36–54)
HCT VFR BLD CALC: 27 %PCV (ref 36–54)
HCV AB SERPL QL IA: NEGATIVE
HDLC SERPL-MCNC: 70 MG/DL (ref 40–75)
HDLC SERPL: 41.4 % (ref 20–50)
HGB BLD-MCNC: 10 G/DL (ref 12–16)
HGB BLD-MCNC: 5.8 G/DL (ref 12–16)
HGB BLD-MCNC: 6.1 G/DL (ref 12–16)
HGB BLD-MCNC: 6.5 G/DL (ref 12–16)
HGB BLD-MCNC: 6.8 G/DL (ref 12–16)
HGB BLD-MCNC: 6.9 G/DL (ref 12–16)
HGB BLD-MCNC: 7 G/DL (ref 12–16)
HGB BLD-MCNC: 7 G/DL (ref 12–16)
HGB BLD-MCNC: 7.1 G/DL (ref 12–16)
HGB BLD-MCNC: 7.2 G/DL (ref 12–16)
HGB BLD-MCNC: 7.3 G/DL (ref 12–16)
HGB BLD-MCNC: 7.4 G/DL (ref 12–16)
HGB BLD-MCNC: 7.4 G/DL (ref 12–16)
HGB BLD-MCNC: 7.8 G/DL (ref 12–16)
HGB BLD-MCNC: 7.8 G/DL (ref 12–16)
HGB BLD-MCNC: 8 G/DL (ref 12–16)
HGB BLD-MCNC: 8.1 G/DL (ref 12–16)
HGB BLD-MCNC: 8.2 G/DL (ref 12–16)
HGB BLD-MCNC: 8.3 G/DL (ref 12–16)
HGB BLD-MCNC: 8.3 G/DL (ref 12–16)
HGB BLD-MCNC: 8.4 G/DL (ref 12–16)
HGB BLD-MCNC: 8.4 G/DL (ref 12–16)
HGB BLD-MCNC: 8.5 G/DL (ref 12–16)
HGB BLD-MCNC: 8.5 G/DL (ref 12–16)
HGB BLD-MCNC: 8.6 G/DL (ref 12–16)
HGB BLD-MCNC: 8.6 G/DL (ref 12–16)
HGB BLD-MCNC: 8.7 G/DL (ref 12–16)
HGB BLD-MCNC: 8.8 G/DL (ref 12–16)
HGB BLD-MCNC: 8.8 G/DL (ref 12–16)
HGB BLD-MCNC: 8.9 G/DL (ref 12–16)
HGB BLD-MCNC: 9 G/DL (ref 12–16)
HGB BLD-MCNC: 9.1 G/DL (ref 12–16)
HGB BLD-MCNC: 9.1 G/DL (ref 12–16)
HGB BLD-MCNC: 9.2 G/DL (ref 12–16)
HGB BLD-MCNC: 9.2 G/DL (ref 12–16)
HGB BLD-MCNC: 9.3 G/DL (ref 12–16)
HGB BLD-MCNC: 9.3 G/DL (ref 12–16)
HGB BLD-MCNC: 9.5 G/DL (ref 12–16)
HGB BLD-MCNC: 9.5 G/DL (ref 12–16)
HGB BLD-MCNC: 9.6 G/DL (ref 12–16)
HGB BLD-MCNC: 9.7 G/DL (ref 12–16)
HGB BLD-MCNC: 9.8 G/DL (ref 12–16)
HGB BLD-MCNC: 9.9 G/DL (ref 12–16)
HGB UR QL STRIP: ABNORMAL
HGB UR QL STRIP: NEGATIVE
HGB UR QL STRIP: NEGATIVE
HYALINE CASTS #/AREA URNS LPF: 0 /LPF
IMM GRANULOCYTES # BLD AUTO: 0.01 K/UL (ref 0–0.04)
IMM GRANULOCYTES # BLD AUTO: 0.02 K/UL (ref 0–0.04)
IMM GRANULOCYTES # BLD AUTO: 0.03 K/UL (ref 0–0.04)
IMM GRANULOCYTES # BLD AUTO: 0.04 K/UL (ref 0–0.04)
IMM GRANULOCYTES # BLD AUTO: 0.05 K/UL (ref 0–0.04)
IMM GRANULOCYTES # BLD AUTO: 0.06 K/UL (ref 0–0.04)
IMM GRANULOCYTES # BLD AUTO: 0.06 K/UL (ref 0–0.04)
IMM GRANULOCYTES # BLD AUTO: 0.07 K/UL (ref 0–0.04)
IMM GRANULOCYTES # BLD AUTO: 0.09 K/UL (ref 0–0.04)
IMM GRANULOCYTES # BLD AUTO: 0.11 K/UL (ref 0–0.04)
IMM GRANULOCYTES # BLD AUTO: 0.12 K/UL (ref 0–0.04)
IMM GRANULOCYTES NFR BLD AUTO: 0.2 % (ref 0–0.5)
IMM GRANULOCYTES NFR BLD AUTO: 0.3 % (ref 0–0.5)
IMM GRANULOCYTES NFR BLD AUTO: 0.4 % (ref 0–0.5)
IMM GRANULOCYTES NFR BLD AUTO: 0.5 % (ref 0–0.5)
IMM GRANULOCYTES NFR BLD AUTO: 0.6 % (ref 0–0.5)
IMM GRANULOCYTES NFR BLD AUTO: 0.7 % (ref 0–0.5)
IMM GRANULOCYTES NFR BLD AUTO: 0.8 % (ref 0–0.5)
IMM GRANULOCYTES NFR BLD AUTO: 0.8 % (ref 0–0.5)
INR PPP: 0.9 (ref 0.8–1.2)
INR PPP: 1.1 (ref 0.8–1.2)
INTERVENTRICULAR SEPTUM: 0.7 CM (ref 0.6–1.1)
INTERVENTRICULAR SEPTUM: 0.82 CM (ref 0.6–1.1)
IRON SERPL-MCNC: 56 UG/DL (ref 30–160)
IVRT: 156.04 MSEC
KETONES UR QL STRIP: NEGATIVE
KOH PREP SPEC: NORMAL
LA MAJOR: 4.06 CM
LA MAJOR: 4.71 CM
LA MINOR: 4.09 CM
LA MINOR: 4.58 CM
LA WIDTH: 3.67 CM
LA WIDTH: 3.95 CM
LACTATE SERPL-SCNC: 0.9 MMOL/L (ref 0.5–2.2)
LDH SERPL L TO P-CCNC: 290 U/L (ref 110–260)
LDH SERPL L TO P-CCNC: 443 U/L (ref 110–260)
LDLC SERPL CALC-MCNC: 90.2 MG/DL (ref 63–159)
LEFT ATRIUM SIZE: 3.07 CM
LEFT ATRIUM SIZE: 3.76 CM
LEFT ATRIUM VOLUME INDEX MOD: 23.6 ML/M2
LEFT ATRIUM VOLUME INDEX MOD: 25.4 ML/M2
LEFT ATRIUM VOLUME INDEX: 25.1 ML/M2
LEFT ATRIUM VOLUME INDEX: 30.8 ML/M2
LEFT ATRIUM VOLUME MOD: 39.42 CM3
LEFT ATRIUM VOLUME MOD: 44.91 CM3
LEFT ATRIUM VOLUME: 44.48 CM3
LEFT ATRIUM VOLUME: 51.44 CM3
LEFT INTERNAL DIMENSION IN SYSTOLE: 2.9 CM (ref 2.1–4)
LEFT INTERNAL DIMENSION IN SYSTOLE: 3.36 CM (ref 2.1–4)
LEFT VENTRICLE DIASTOLIC VOLUME INDEX: 43.24 ML/M2
LEFT VENTRICLE DIASTOLIC VOLUME INDEX: 51.56 ML/M2
LEFT VENTRICLE DIASTOLIC VOLUME: 76.53 ML
LEFT VENTRICLE DIASTOLIC VOLUME: 86.1 ML
LEFT VENTRICLE MASS INDEX: 60 G/M2
LEFT VENTRICLE MASS INDEX: 67 G/M2
LEFT VENTRICLE SYSTOLIC VOLUME INDEX: 18.2 ML/M2
LEFT VENTRICLE SYSTOLIC VOLUME INDEX: 27.7 ML/M2
LEFT VENTRICLE SYSTOLIC VOLUME: 32.21 ML
LEFT VENTRICLE SYSTOLIC VOLUME: 46.19 ML
LEFT VENTRICULAR INTERNAL DIMENSION IN DIASTOLE: 4.15 CM (ref 3.5–6)
LEFT VENTRICULAR INTERNAL DIMENSION IN DIASTOLE: 4.5 CM (ref 3.5–6)
LEFT VENTRICULAR MASS: 106 G
LEFT VENTRICULAR MASS: 111.78 G
LEUKOCYTE ESTERASE UR QL STRIP: ABNORMAL
LEUKOCYTE ESTERASE UR QL STRIP: NEGATIVE
LEUKOCYTE ESTERASE UR QL STRIP: NEGATIVE
LIPASE SERPL-CCNC: 4 U/L (ref 4–60)
LV LATERAL E/E' RATIO: 6.38 M/S
LV LATERAL E/E' RATIO: 7.71 M/S
LV SEPTAL E/E' RATIO: 10.2 M/S
LV SEPTAL E/E' RATIO: 9 M/S
LYMPHOCYTES # BLD AUTO: 0.4 K/UL (ref 1–4.8)
LYMPHOCYTES # BLD AUTO: 0.6 K/UL (ref 1–4.8)
LYMPHOCYTES # BLD AUTO: 0.7 K/UL (ref 1–4.8)
LYMPHOCYTES # BLD AUTO: 0.8 K/UL (ref 1–4.8)
LYMPHOCYTES # BLD AUTO: 0.9 K/UL (ref 1–4.8)
LYMPHOCYTES # BLD AUTO: 1 K/UL (ref 1–4.8)
LYMPHOCYTES # BLD AUTO: 1 K/UL (ref 1–4.8)
LYMPHOCYTES # BLD AUTO: 1.1 K/UL (ref 1–4.8)
LYMPHOCYTES # BLD AUTO: 1.1 K/UL (ref 1–4.8)
LYMPHOCYTES # BLD AUTO: 1.2 K/UL (ref 1–4.8)
LYMPHOCYTES # BLD AUTO: 1.3 K/UL (ref 1–4.8)
LYMPHOCYTES # BLD AUTO: 1.4 K/UL (ref 1–4.8)
LYMPHOCYTES # BLD AUTO: 1.5 K/UL (ref 1–4.8)
LYMPHOCYTES # BLD AUTO: 1.6 K/UL (ref 1–4.8)
LYMPHOCYTES # BLD AUTO: 1.6 K/UL (ref 1–4.8)
LYMPHOCYTES # BLD AUTO: 1.7 K/UL (ref 1–4.8)
LYMPHOCYTES # BLD AUTO: 1.8 K/UL (ref 1–4.8)
LYMPHOCYTES # BLD AUTO: 1.8 K/UL (ref 1–4.8)
LYMPHOCYTES # BLD AUTO: 1.9 K/UL (ref 1–4.8)
LYMPHOCYTES # BLD AUTO: 2 K/UL (ref 1–4.8)
LYMPHOCYTES NFR BLD: 10 % (ref 18–48)
LYMPHOCYTES NFR BLD: 10.4 % (ref 18–48)
LYMPHOCYTES NFR BLD: 11.3 % (ref 18–48)
LYMPHOCYTES NFR BLD: 11.5 % (ref 18–48)
LYMPHOCYTES NFR BLD: 13.1 % (ref 18–48)
LYMPHOCYTES NFR BLD: 13.2 % (ref 18–48)
LYMPHOCYTES NFR BLD: 14.4 % (ref 18–48)
LYMPHOCYTES NFR BLD: 14.5 % (ref 18–48)
LYMPHOCYTES NFR BLD: 14.8 % (ref 18–48)
LYMPHOCYTES NFR BLD: 16 % (ref 18–48)
LYMPHOCYTES NFR BLD: 17 % (ref 18–48)
LYMPHOCYTES NFR BLD: 17.4 % (ref 18–48)
LYMPHOCYTES NFR BLD: 17.4 % (ref 18–48)
LYMPHOCYTES NFR BLD: 17.9 % (ref 18–48)
LYMPHOCYTES NFR BLD: 18 % (ref 18–48)
LYMPHOCYTES NFR BLD: 19.3 % (ref 18–48)
LYMPHOCYTES NFR BLD: 19.5 % (ref 18–48)
LYMPHOCYTES NFR BLD: 19.7 % (ref 18–48)
LYMPHOCYTES NFR BLD: 19.8 % (ref 18–48)
LYMPHOCYTES NFR BLD: 20.2 % (ref 18–48)
LYMPHOCYTES NFR BLD: 20.6 % (ref 18–48)
LYMPHOCYTES NFR BLD: 20.7 % (ref 18–48)
LYMPHOCYTES NFR BLD: 20.7 % (ref 18–48)
LYMPHOCYTES NFR BLD: 20.8 % (ref 18–48)
LYMPHOCYTES NFR BLD: 20.9 % (ref 18–48)
LYMPHOCYTES NFR BLD: 21 % (ref 18–48)
LYMPHOCYTES NFR BLD: 21.8 % (ref 18–48)
LYMPHOCYTES NFR BLD: 21.9 % (ref 18–48)
LYMPHOCYTES NFR BLD: 23.3 % (ref 18–48)
LYMPHOCYTES NFR BLD: 23.3 % (ref 18–48)
LYMPHOCYTES NFR BLD: 23.8 % (ref 18–48)
LYMPHOCYTES NFR BLD: 26.1 % (ref 18–48)
LYMPHOCYTES NFR BLD: 28.2 % (ref 18–48)
LYMPHOCYTES NFR BLD: 30.2 % (ref 18–48)
LYMPHOCYTES NFR BLD: 30.4 % (ref 18–48)
LYMPHOCYTES NFR BLD: 30.9 % (ref 18–48)
LYMPHOCYTES NFR BLD: 31.4 % (ref 18–48)
LYMPHOCYTES NFR BLD: 35 % (ref 18–48)
LYMPHOCYTES NFR BLD: 4.1 % (ref 18–48)
LYMPHOCYTES NFR BLD: 5.2 % (ref 18–48)
LYMPHOCYTES NFR BLD: 7.1 % (ref 18–48)
LYMPHOCYTES NFR BLD: 7.6 % (ref 18–48)
LYMPHOCYTES NFR BLD: 8.2 % (ref 18–48)
LYMPHOCYTES NFR BLD: 8.3 % (ref 18–48)
LYMPHOCYTES NFR BLD: 8.3 % (ref 18–48)
LYMPHOCYTES NFR BLD: 8.5 % (ref 18–48)
LYMPHOCYTES NFR BLD: 8.6 % (ref 18–48)
LYMPHOCYTES NFR BLD: 8.6 % (ref 18–48)
LYMPHOCYTES NFR BLD: 9.9 % (ref 18–48)
LYMPHOCYTES NFR FLD MANUAL: 1 %
LYMPHOCYTES NFR FLD MANUAL: 2 %
MAGNESIUM SERPL-MCNC: 1.7 MG/DL (ref 1.6–2.6)
MAGNESIUM SERPL-MCNC: 1.8 MG/DL (ref 1.6–2.6)
MAGNESIUM SERPL-MCNC: 1.8 MG/DL (ref 1.6–2.6)
MAGNESIUM SERPL-MCNC: 1.9 MG/DL (ref 1.6–2.6)
MAGNESIUM SERPL-MCNC: 2 MG/DL (ref 1.6–2.6)
MAGNESIUM SERPL-MCNC: 2.1 MG/DL (ref 1.6–2.6)
MAGNESIUM SERPL-MCNC: 2.2 MG/DL (ref 1.6–2.6)
MAGNESIUM SERPL-MCNC: 2.2 MG/DL (ref 1.6–2.6)
MAGNESIUM SERPL-MCNC: 2.3 MG/DL (ref 1.6–2.6)
MAGNESIUM SERPL-MCNC: 2.4 MG/DL (ref 1.6–2.6)
MAGNESIUM SERPL-MCNC: 2.8 MG/DL (ref 1.6–2.6)
MAGNESIUM SERPL-MCNC: 2.9 MG/DL (ref 1.6–2.6)
MCH RBC QN AUTO: 27.4 PG (ref 27–31)
MCH RBC QN AUTO: 27.4 PG (ref 27–31)
MCH RBC QN AUTO: 27.5 PG (ref 27–31)
MCH RBC QN AUTO: 27.6 PG (ref 27–31)
MCH RBC QN AUTO: 27.6 PG (ref 27–31)
MCH RBC QN AUTO: 27.7 PG (ref 27–31)
MCH RBC QN AUTO: 27.8 PG (ref 27–31)
MCH RBC QN AUTO: 27.9 PG (ref 27–31)
MCH RBC QN AUTO: 27.9 PG (ref 27–31)
MCH RBC QN AUTO: 28 PG (ref 27–31)
MCH RBC QN AUTO: 28.1 PG (ref 27–31)
MCH RBC QN AUTO: 28.1 PG (ref 27–31)
MCH RBC QN AUTO: 28.2 PG (ref 27–31)
MCH RBC QN AUTO: 28.3 PG (ref 27–31)
MCH RBC QN AUTO: 28.4 PG (ref 27–31)
MCH RBC QN AUTO: 28.5 PG (ref 27–31)
MCH RBC QN AUTO: 28.5 PG (ref 27–31)
MCH RBC QN AUTO: 28.6 PG (ref 27–31)
MCH RBC QN AUTO: 28.7 PG (ref 27–31)
MCH RBC QN AUTO: 28.8 PG (ref 27–31)
MCH RBC QN AUTO: 28.8 PG (ref 27–31)
MCH RBC QN AUTO: 28.9 PG (ref 27–31)
MCH RBC QN AUTO: 29 PG (ref 27–31)
MCH RBC QN AUTO: 29.1 PG (ref 27–31)
MCH RBC QN AUTO: 29.2 PG (ref 27–31)
MCH RBC QN AUTO: 29.4 PG (ref 27–31)
MCHC RBC AUTO-ENTMCNC: 29.5 G/DL (ref 32–36)
MCHC RBC AUTO-ENTMCNC: 29.6 G/DL (ref 32–36)
MCHC RBC AUTO-ENTMCNC: 29.8 G/DL (ref 32–36)
MCHC RBC AUTO-ENTMCNC: 29.8 G/DL (ref 32–36)
MCHC RBC AUTO-ENTMCNC: 29.9 G/DL (ref 32–36)
MCHC RBC AUTO-ENTMCNC: 30.1 G/DL (ref 32–36)
MCHC RBC AUTO-ENTMCNC: 30.1 G/DL (ref 32–36)
MCHC RBC AUTO-ENTMCNC: 30.2 G/DL (ref 32–36)
MCHC RBC AUTO-ENTMCNC: 30.3 G/DL (ref 32–36)
MCHC RBC AUTO-ENTMCNC: 30.4 G/DL (ref 32–36)
MCHC RBC AUTO-ENTMCNC: 30.5 G/DL (ref 32–36)
MCHC RBC AUTO-ENTMCNC: 30.5 G/DL (ref 32–36)
MCHC RBC AUTO-ENTMCNC: 30.6 G/DL (ref 32–36)
MCHC RBC AUTO-ENTMCNC: 30.6 G/DL (ref 32–36)
MCHC RBC AUTO-ENTMCNC: 30.7 G/DL (ref 32–36)
MCHC RBC AUTO-ENTMCNC: 30.8 G/DL (ref 32–36)
MCHC RBC AUTO-ENTMCNC: 30.9 G/DL (ref 32–36)
MCHC RBC AUTO-ENTMCNC: 31 G/DL (ref 32–36)
MCHC RBC AUTO-ENTMCNC: 31 G/DL (ref 32–36)
MCHC RBC AUTO-ENTMCNC: 31.1 G/DL (ref 32–36)
MCHC RBC AUTO-ENTMCNC: 31.1 G/DL (ref 32–36)
MCHC RBC AUTO-ENTMCNC: 31.2 G/DL (ref 32–36)
MCHC RBC AUTO-ENTMCNC: 31.3 G/DL (ref 32–36)
MCHC RBC AUTO-ENTMCNC: 31.4 G/DL (ref 32–36)
MCHC RBC AUTO-ENTMCNC: 31.5 G/DL (ref 32–36)
MCHC RBC AUTO-ENTMCNC: 31.5 G/DL (ref 32–36)
MCHC RBC AUTO-ENTMCNC: 31.6 G/DL (ref 32–36)
MCHC RBC AUTO-ENTMCNC: 31.6 G/DL (ref 32–36)
MCHC RBC AUTO-ENTMCNC: 31.7 G/DL (ref 32–36)
MCHC RBC AUTO-ENTMCNC: 31.8 G/DL (ref 32–36)
MCHC RBC AUTO-ENTMCNC: 32.1 G/DL (ref 32–36)
MCHC RBC AUTO-ENTMCNC: 32.2 G/DL (ref 32–36)
MCHC RBC AUTO-ENTMCNC: 32.3 G/DL (ref 32–36)
MCHC RBC AUTO-ENTMCNC: 32.4 G/DL (ref 32–36)
MCHC RBC AUTO-ENTMCNC: 32.4 G/DL (ref 32–36)
MCHC RBC AUTO-ENTMCNC: 32.5 G/DL (ref 32–36)
MCHC RBC AUTO-ENTMCNC: 32.7 G/DL (ref 32–36)
MCHC RBC AUTO-ENTMCNC: 33.1 G/DL (ref 32–36)
MCHC RBC AUTO-ENTMCNC: 33.2 G/DL (ref 32–36)
MCHC RBC AUTO-ENTMCNC: 33.5 G/DL (ref 32–36)
MCHC RBC AUTO-ENTMCNC: 33.6 G/DL (ref 32–36)
MCV RBC AUTO: 86 FL (ref 82–98)
MCV RBC AUTO: 87 FL (ref 82–98)
MCV RBC AUTO: 88 FL (ref 82–98)
MCV RBC AUTO: 89 FL (ref 82–98)
MCV RBC AUTO: 90 FL (ref 82–98)
MCV RBC AUTO: 91 FL (ref 82–98)
MCV RBC AUTO: 92 FL (ref 82–98)
MCV RBC AUTO: 93 FL (ref 82–98)
MCV RBC AUTO: 94 FL (ref 82–98)
MCV RBC AUTO: 94 FL (ref 82–98)
MCV RBC AUTO: 95 FL (ref 82–98)
MCV RBC AUTO: 95 FL (ref 82–98)
MCV RBC AUTO: 96 FL (ref 82–98)
MCV RBC AUTO: 98 FL (ref 82–98)
MICROSCOPIC COMMENT: ABNORMAL
MICROSCOPIC COMMENT: NORMAL
MODE: ABNORMAL
MONOCYTES # BLD AUTO: 0.2 K/UL (ref 0.3–1)
MONOCYTES # BLD AUTO: 0.5 K/UL (ref 0.3–1)
MONOCYTES # BLD AUTO: 0.6 K/UL (ref 0.3–1)
MONOCYTES # BLD AUTO: 0.7 K/UL (ref 0.3–1)
MONOCYTES # BLD AUTO: 0.8 K/UL (ref 0.3–1)
MONOCYTES # BLD AUTO: 0.9 K/UL (ref 0.3–1)
MONOCYTES # BLD AUTO: 1 K/UL (ref 0.3–1)
MONOCYTES # BLD AUTO: 1.1 K/UL (ref 0.3–1)
MONOCYTES # BLD AUTO: 1.1 K/UL (ref 0.3–1)
MONOCYTES # BLD AUTO: 1.2 K/UL (ref 0.3–1)
MONOCYTES # BLD AUTO: 1.3 K/UL (ref 0.3–1)
MONOCYTES # BLD AUTO: 1.4 K/UL (ref 0.3–1)
MONOCYTES NFR BLD: 1.2 % (ref 4–15)
MONOCYTES NFR BLD: 10 % (ref 4–15)
MONOCYTES NFR BLD: 10 % (ref 4–15)
MONOCYTES NFR BLD: 10.5 % (ref 4–15)
MONOCYTES NFR BLD: 10.8 % (ref 4–15)
MONOCYTES NFR BLD: 10.9 % (ref 4–15)
MONOCYTES NFR BLD: 10.9 % (ref 4–15)
MONOCYTES NFR BLD: 11.1 % (ref 4–15)
MONOCYTES NFR BLD: 11.3 % (ref 4–15)
MONOCYTES NFR BLD: 11.6 % (ref 4–15)
MONOCYTES NFR BLD: 11.7 % (ref 4–15)
MONOCYTES NFR BLD: 11.7 % (ref 4–15)
MONOCYTES NFR BLD: 11.8 % (ref 4–15)
MONOCYTES NFR BLD: 12 % (ref 4–15)
MONOCYTES NFR BLD: 12.1 % (ref 4–15)
MONOCYTES NFR BLD: 12.2 % (ref 4–15)
MONOCYTES NFR BLD: 12.3 % (ref 4–15)
MONOCYTES NFR BLD: 12.5 % (ref 4–15)
MONOCYTES NFR BLD: 12.7 % (ref 4–15)
MONOCYTES NFR BLD: 13.4 % (ref 4–15)
MONOCYTES NFR BLD: 13.5 % (ref 4–15)
MONOCYTES NFR BLD: 13.6 % (ref 4–15)
MONOCYTES NFR BLD: 14.1 % (ref 4–15)
MONOCYTES NFR BLD: 14.3 % (ref 4–15)
MONOCYTES NFR BLD: 14.4 % (ref 4–15)
MONOCYTES NFR BLD: 15.1 % (ref 4–15)
MONOCYTES NFR BLD: 6.6 % (ref 4–15)
MONOCYTES NFR BLD: 6.6 % (ref 4–15)
MONOCYTES NFR BLD: 6.7 % (ref 4–15)
MONOCYTES NFR BLD: 7.5 % (ref 4–15)
MONOCYTES NFR BLD: 8.1 % (ref 4–15)
MONOCYTES NFR BLD: 8.2 % (ref 4–15)
MONOCYTES NFR BLD: 8.3 % (ref 4–15)
MONOCYTES NFR BLD: 8.5 % (ref 4–15)
MONOCYTES NFR BLD: 9 % (ref 4–15)
MONOCYTES NFR BLD: 9 % (ref 4–15)
MONOCYTES NFR BLD: 9.2 % (ref 4–15)
MONOCYTES NFR BLD: 9.2 % (ref 4–15)
MONOCYTES NFR BLD: 9.3 % (ref 4–15)
MONOCYTES NFR BLD: 9.8 % (ref 4–15)
MONOCYTES NFR BLD: 9.8 % (ref 4–15)
MONOCYTES NFR BLD: 9.9 % (ref 4–15)
MONOS+MACROS NFR FLD MANUAL: 16 %
MONOS+MACROS NFR FLD MANUAL: 8 %
MV A" WAVE DURATION": 13.42 MSEC
MV PEAK A VEL: 1.22 M/S
MV PEAK A VEL: 1.38 M/S
MV PEAK E VEL: 0.51 M/S
MV PEAK E VEL: 0.54 M/S
MV STENOSIS PRESSURE HALF TIME: 67.24 MS
MV STENOSIS PRESSURE HALF TIME: 75.15 MS
MV VALVE AREA P 1/2 METHOD: 2.93 CM2
MV VALVE AREA P 1/2 METHOD: 3.27 CM2
NEUTROPHILS # BLD AUTO: 10 K/UL (ref 1.8–7.7)
NEUTROPHILS # BLD AUTO: 11.4 K/UL (ref 1.8–7.7)
NEUTROPHILS # BLD AUTO: 11.9 K/UL (ref 1.8–7.7)
NEUTROPHILS # BLD AUTO: 14.3 K/UL (ref 1.8–7.7)
NEUTROPHILS # BLD AUTO: 15.3 K/UL (ref 1.8–7.7)
NEUTROPHILS # BLD AUTO: 2.6 K/UL (ref 1.8–7.7)
NEUTROPHILS # BLD AUTO: 2.7 K/UL (ref 1.8–7.7)
NEUTROPHILS # BLD AUTO: 2.7 K/UL (ref 1.8–7.7)
NEUTROPHILS # BLD AUTO: 2.8 K/UL (ref 1.8–7.7)
NEUTROPHILS # BLD AUTO: 2.9 K/UL (ref 1.8–7.7)
NEUTROPHILS # BLD AUTO: 3.1 K/UL (ref 1.8–7.7)
NEUTROPHILS # BLD AUTO: 3.2 K/UL (ref 1.8–7.7)
NEUTROPHILS # BLD AUTO: 3.3 K/UL (ref 1.8–7.7)
NEUTROPHILS # BLD AUTO: 3.4 K/UL (ref 1.8–7.7)
NEUTROPHILS # BLD AUTO: 3.6 K/UL (ref 1.8–7.7)
NEUTROPHILS # BLD AUTO: 3.7 K/UL (ref 1.8–7.7)
NEUTROPHILS # BLD AUTO: 3.8 K/UL (ref 1.8–7.7)
NEUTROPHILS # BLD AUTO: 3.8 K/UL (ref 1.8–7.7)
NEUTROPHILS # BLD AUTO: 3.9 K/UL (ref 1.8–7.7)
NEUTROPHILS # BLD AUTO: 4.1 K/UL (ref 1.8–7.7)
NEUTROPHILS # BLD AUTO: 4.1 K/UL (ref 1.8–7.7)
NEUTROPHILS # BLD AUTO: 4.3 K/UL (ref 1.8–7.7)
NEUTROPHILS # BLD AUTO: 4.4 K/UL (ref 1.8–7.7)
NEUTROPHILS # BLD AUTO: 4.4 K/UL (ref 1.8–7.7)
NEUTROPHILS # BLD AUTO: 4.5 K/UL (ref 1.8–7.7)
NEUTROPHILS # BLD AUTO: 4.6 K/UL (ref 1.8–7.7)
NEUTROPHILS # BLD AUTO: 4.7 K/UL (ref 1.8–7.7)
NEUTROPHILS # BLD AUTO: 4.8 K/UL (ref 1.8–7.7)
NEUTROPHILS # BLD AUTO: 5.2 K/UL (ref 1.8–7.7)
NEUTROPHILS # BLD AUTO: 5.4 K/UL (ref 1.8–7.7)
NEUTROPHILS # BLD AUTO: 5.8 K/UL (ref 1.8–7.7)
NEUTROPHILS # BLD AUTO: 5.9 K/UL (ref 1.8–7.7)
NEUTROPHILS # BLD AUTO: 6.1 K/UL (ref 1.8–7.7)
NEUTROPHILS # BLD AUTO: 6.5 K/UL (ref 1.8–7.7)
NEUTROPHILS # BLD AUTO: 7 K/UL (ref 1.8–7.7)
NEUTROPHILS # BLD AUTO: 7.4 K/UL (ref 1.8–7.7)
NEUTROPHILS # BLD AUTO: 7.8 K/UL (ref 1.8–7.7)
NEUTROPHILS # BLD AUTO: 7.8 K/UL (ref 1.8–7.7)
NEUTROPHILS # BLD AUTO: 8.5 K/UL (ref 1.8–7.7)
NEUTROPHILS # BLD AUTO: 9.1 K/UL (ref 1.8–7.7)
NEUTROPHILS # BLD AUTO: 9.9 K/UL (ref 1.8–7.7)
NEUTROPHILS NFR BLD: 49.4 % (ref 38–73)
NEUTROPHILS NFR BLD: 50.2 % (ref 38–73)
NEUTROPHILS NFR BLD: 51.2 % (ref 38–73)
NEUTROPHILS NFR BLD: 51.7 % (ref 38–73)
NEUTROPHILS NFR BLD: 51.7 % (ref 38–73)
NEUTROPHILS NFR BLD: 51.8 % (ref 38–73)
NEUTROPHILS NFR BLD: 52.1 % (ref 38–73)
NEUTROPHILS NFR BLD: 52.5 % (ref 38–73)
NEUTROPHILS NFR BLD: 52.6 % (ref 38–73)
NEUTROPHILS NFR BLD: 53.2 % (ref 38–73)
NEUTROPHILS NFR BLD: 53.7 % (ref 38–73)
NEUTROPHILS NFR BLD: 54.2 % (ref 38–73)
NEUTROPHILS NFR BLD: 56 % (ref 38–73)
NEUTROPHILS NFR BLD: 56.2 % (ref 38–73)
NEUTROPHILS NFR BLD: 56.6 % (ref 38–73)
NEUTROPHILS NFR BLD: 57.6 % (ref 38–73)
NEUTROPHILS NFR BLD: 58 % (ref 38–73)
NEUTROPHILS NFR BLD: 58.1 % (ref 38–73)
NEUTROPHILS NFR BLD: 59.5 % (ref 38–73)
NEUTROPHILS NFR BLD: 60.8 % (ref 38–73)
NEUTROPHILS NFR BLD: 61 % (ref 38–73)
NEUTROPHILS NFR BLD: 62.4 % (ref 38–73)
NEUTROPHILS NFR BLD: 62.6 % (ref 38–73)
NEUTROPHILS NFR BLD: 63.3 % (ref 38–73)
NEUTROPHILS NFR BLD: 63.4 % (ref 38–73)
NEUTROPHILS NFR BLD: 64.2 % (ref 38–73)
NEUTROPHILS NFR BLD: 64.5 % (ref 38–73)
NEUTROPHILS NFR BLD: 66.6 % (ref 38–73)
NEUTROPHILS NFR BLD: 67.1 % (ref 38–73)
NEUTROPHILS NFR BLD: 67.8 % (ref 38–73)
NEUTROPHILS NFR BLD: 69.5 % (ref 38–73)
NEUTROPHILS NFR BLD: 70.3 % (ref 38–73)
NEUTROPHILS NFR BLD: 70.5 % (ref 38–73)
NEUTROPHILS NFR BLD: 70.7 % (ref 38–73)
NEUTROPHILS NFR BLD: 73.2 % (ref 38–73)
NEUTROPHILS NFR BLD: 76.6 % (ref 38–73)
NEUTROPHILS NFR BLD: 76.8 % (ref 38–73)
NEUTROPHILS NFR BLD: 78 % (ref 38–73)
NEUTROPHILS NFR BLD: 78.8 % (ref 38–73)
NEUTROPHILS NFR BLD: 79.6 % (ref 38–73)
NEUTROPHILS NFR BLD: 79.7 % (ref 38–73)
NEUTROPHILS NFR BLD: 80.5 % (ref 38–73)
NEUTROPHILS NFR BLD: 81.3 % (ref 38–73)
NEUTROPHILS NFR BLD: 81.6 % (ref 38–73)
NEUTROPHILS NFR BLD: 81.8 % (ref 38–73)
NEUTROPHILS NFR BLD: 83.1 % (ref 38–73)
NEUTROPHILS NFR BLD: 84.4 % (ref 38–73)
NEUTROPHILS NFR BLD: 84.7 % (ref 38–73)
NEUTROPHILS NFR BLD: 93.8 % (ref 38–73)
NEUTROPHILS NFR FLD MANUAL: 1 %
NEUTROPHILS NFR FLD MANUAL: 1 %
NIACIN SERPL-MCNC: 2.34 UG/ML
NITRITE UR QL STRIP: NEGATIVE
NON-SQ EPI CELLS #/AREA URNS HPF: 2 /HPF
NONHDLC SERPL-MCNC: 99 MG/DL
NRBC BLD-RTO: 0 /100 WBC
NUM UNITS TRANS PACKED RBC: NORMAL
OSMOLALITY SERPL: 297 MOSM/KG (ref 275–295)
OSMOLALITY UR: 151 MOSM/KG (ref 50–1200)
PCO2 BLDA: 27.7 MMHG (ref 35–45)
PCO2 BLDA: 35.5 MMHG (ref 35–45)
PCO2 BLDA: 40.3 MMHG (ref 35–45)
PH SMN: 7.28 [PH] (ref 7.35–7.45)
PH SMN: 7.45 [PH] (ref 7.35–7.45)
PH SMN: 7.46 [PH] (ref 7.35–7.45)
PH UR STRIP: 5 [PH] (ref 5–8)
PH UR STRIP: 6 [PH] (ref 5–8)
PH UR STRIP: 7 [PH] (ref 5–8)
PHOSPHATE SERPL-MCNC: 2.5 MG/DL (ref 2.7–4.5)
PHOSPHATE SERPL-MCNC: 2.6 MG/DL (ref 2.7–4.5)
PHOSPHATE SERPL-MCNC: 3 MG/DL (ref 2.7–4.5)
PHOSPHATE SERPL-MCNC: 3 MG/DL (ref 2.7–4.5)
PHOSPHATE SERPL-MCNC: 3.2 MG/DL (ref 2.7–4.5)
PHOSPHATE SERPL-MCNC: 3.2 MG/DL (ref 2.7–4.5)
PHOSPHATE SERPL-MCNC: 3.5 MG/DL (ref 2.7–4.5)
PHOSPHATE SERPL-MCNC: 3.7 MG/DL (ref 2.7–4.5)
PHOSPHATE SERPL-MCNC: 3.8 MG/DL (ref 2.7–4.5)
PHOSPHATE SERPL-MCNC: 3.9 MG/DL (ref 2.7–4.5)
PHOSPHATE SERPL-MCNC: 4 MG/DL (ref 2.7–4.5)
PHOSPHATE SERPL-MCNC: 4 MG/DL (ref 2.7–4.5)
PHOSPHATE SERPL-MCNC: 4.1 MG/DL (ref 2.7–4.5)
PHOSPHATE SERPL-MCNC: 4.2 MG/DL (ref 2.7–4.5)
PHOSPHATE SERPL-MCNC: 4.3 MG/DL (ref 2.7–4.5)
PHOSPHATE SERPL-MCNC: 4.5 MG/DL (ref 2.7–4.5)
PHOSPHATE SERPL-MCNC: 4.6 MG/DL (ref 2.7–4.5)
PHOSPHATE SERPL-MCNC: 4.7 MG/DL (ref 2.7–4.5)
PHOSPHATE SERPL-MCNC: 4.8 MG/DL (ref 2.7–4.5)
PHOSPHATE SERPL-MCNC: 5.1 MG/DL (ref 2.7–4.5)
PHOSPHATE SERPL-MCNC: 5.1 MG/DL (ref 2.7–4.5)
PHOSPHATE SERPL-MCNC: 5.2 MG/DL (ref 2.7–4.5)
PHOSPHATE SERPL-MCNC: 5.2 MG/DL (ref 2.7–4.5)
PHOSPHATE SERPL-MCNC: 5.3 MG/DL (ref 2.7–4.5)
PHOSPHATE SERPL-MCNC: 5.5 MG/DL (ref 2.7–4.5)
PHOSPHATE SERPL-MCNC: 5.6 MG/DL (ref 2.7–4.5)
PHOSPHATE SERPL-MCNC: 5.7 MG/DL (ref 2.7–4.5)
PHOSPHATE SERPL-MCNC: 5.9 MG/DL (ref 2.7–4.5)
PHOSPHATE SERPL-MCNC: 6 MG/DL (ref 2.7–4.5)
PHOSPHATE SERPL-MCNC: 6.1 MG/DL (ref 2.7–4.5)
PHOSPHATE SERPL-MCNC: 6.2 MG/DL (ref 2.7–4.5)
PHOSPHATE SERPL-MCNC: 6.2 MG/DL (ref 2.7–4.5)
PHOSPHATE SERPL-MCNC: 6.4 MG/DL (ref 2.7–4.5)
PHOSPHATE SERPL-MCNC: 6.4 MG/DL (ref 2.7–4.5)
PHOSPHATE SERPL-MCNC: 6.5 MG/DL (ref 2.7–4.5)
PHOSPHATE SERPL-MCNC: 6.8 MG/DL (ref 2.7–4.5)
PHOSPHATE SERPL-MCNC: 7.1 MG/DL (ref 2.7–4.5)
PHOSPHATE SERPL-MCNC: 7.1 MG/DL (ref 2.7–4.5)
PHOSPHATE SERPL-MCNC: 7.5 MG/DL (ref 2.7–4.5)
PHOSPHATE SERPL-MCNC: 7.7 MG/DL (ref 2.7–4.5)
PHOSPHATE SERPL-MCNC: 7.8 MG/DL (ref 2.7–4.5)
PISA TR MAX VEL: 3.37 M/S
PISA TR MAX VEL: 4.04 M/S
PLATELET # BLD AUTO: 151 K/UL (ref 150–450)
PLATELET # BLD AUTO: 166 K/UL (ref 150–450)
PLATELET # BLD AUTO: 170 K/UL (ref 150–450)
PLATELET # BLD AUTO: 178 K/UL (ref 150–450)
PLATELET # BLD AUTO: 180 K/UL (ref 150–450)
PLATELET # BLD AUTO: 181 K/UL (ref 150–450)
PLATELET # BLD AUTO: 182 K/UL (ref 150–450)
PLATELET # BLD AUTO: 182 K/UL (ref 150–450)
PLATELET # BLD AUTO: 184 K/UL (ref 150–450)
PLATELET # BLD AUTO: 189 K/UL (ref 150–450)
PLATELET # BLD AUTO: 189 K/UL (ref 150–450)
PLATELET # BLD AUTO: 194 K/UL (ref 150–450)
PLATELET # BLD AUTO: 194 K/UL (ref 150–450)
PLATELET # BLD AUTO: 195 K/UL (ref 150–450)
PLATELET # BLD AUTO: 198 K/UL (ref 150–450)
PLATELET # BLD AUTO: 200 K/UL (ref 150–350)
PLATELET # BLD AUTO: 200 K/UL (ref 150–450)
PLATELET # BLD AUTO: 202 K/UL (ref 150–350)
PLATELET # BLD AUTO: 203 K/UL (ref 150–350)
PLATELET # BLD AUTO: 203 K/UL (ref 150–450)
PLATELET # BLD AUTO: 203 K/UL (ref 150–450)
PLATELET # BLD AUTO: 204 K/UL (ref 150–450)
PLATELET # BLD AUTO: 206 K/UL (ref 150–450)
PLATELET # BLD AUTO: 208 K/UL (ref 150–450)
PLATELET # BLD AUTO: 209 K/UL (ref 150–350)
PLATELET # BLD AUTO: 210 K/UL (ref 150–350)
PLATELET # BLD AUTO: 210 K/UL (ref 150–450)
PLATELET # BLD AUTO: 212 K/UL (ref 150–450)
PLATELET # BLD AUTO: 213 K/UL (ref 150–350)
PLATELET # BLD AUTO: 214 K/UL (ref 150–450)
PLATELET # BLD AUTO: 214 K/UL (ref 150–450)
PLATELET # BLD AUTO: 215 K/UL (ref 150–350)
PLATELET # BLD AUTO: 219 K/UL (ref 150–350)
PLATELET # BLD AUTO: 219 K/UL (ref 150–450)
PLATELET # BLD AUTO: 219 K/UL (ref 150–450)
PLATELET # BLD AUTO: 220 K/UL (ref 150–350)
PLATELET # BLD AUTO: 221 K/UL (ref 150–450)
PLATELET # BLD AUTO: 223 K/UL (ref 150–350)
PLATELET # BLD AUTO: 226 K/UL (ref 150–450)
PLATELET # BLD AUTO: 234 K/UL (ref 150–350)
PLATELET # BLD AUTO: 239 K/UL (ref 150–350)
PLATELET # BLD AUTO: 239 K/UL (ref 150–350)
PLATELET # BLD AUTO: 241 K/UL (ref 150–350)
PLATELET # BLD AUTO: 242 K/UL (ref 150–450)
PLATELET # BLD AUTO: 247 K/UL (ref 150–350)
PLATELET # BLD AUTO: 250 K/UL (ref 150–350)
PLATELET # BLD AUTO: 255 K/UL (ref 150–450)
PLATELET # BLD AUTO: 260 K/UL (ref 150–450)
PLATELET # BLD AUTO: 265 K/UL (ref 150–450)
PLATELET # BLD AUTO: 278 K/UL (ref 150–350)
PLATELET # BLD AUTO: 324 K/UL (ref 150–350)
PLATELET # BLD AUTO: 335 K/UL (ref 150–350)
PLATELET BLD QL SMEAR: ABNORMAL
PMV BLD AUTO: 10 FL (ref 9.2–12.9)
PMV BLD AUTO: 10.1 FL (ref 9.2–12.9)
PMV BLD AUTO: 10.2 FL (ref 9.2–12.9)
PMV BLD AUTO: 10.4 FL (ref 9.2–12.9)
PMV BLD AUTO: 10.5 FL (ref 9.2–12.9)
PMV BLD AUTO: 10.6 FL (ref 9.2–12.9)
PMV BLD AUTO: 10.7 FL (ref 9.2–12.9)
PMV BLD AUTO: 10.7 FL (ref 9.2–12.9)
PMV BLD AUTO: 10.8 FL (ref 9.2–12.9)
PMV BLD AUTO: 10.8 FL (ref 9.2–12.9)
PMV BLD AUTO: 10.9 FL (ref 9.2–12.9)
PMV BLD AUTO: 10.9 FL (ref 9.2–12.9)
PMV BLD AUTO: 11.1 FL (ref 9.2–12.9)
PMV BLD AUTO: 11.4 FL (ref 9.2–12.9)
PMV BLD AUTO: 8.9 FL (ref 9.2–12.9)
PMV BLD AUTO: 8.9 FL (ref 9.2–12.9)
PMV BLD AUTO: 9.3 FL (ref 9.2–12.9)
PMV BLD AUTO: 9.3 FL (ref 9.2–12.9)
PMV BLD AUTO: 9.4 FL (ref 9.2–12.9)
PMV BLD AUTO: 9.5 FL (ref 9.2–12.9)
PMV BLD AUTO: 9.5 FL (ref 9.2–12.9)
PMV BLD AUTO: 9.6 FL (ref 9.2–12.9)
PMV BLD AUTO: 9.7 FL (ref 9.2–12.9)
PMV BLD AUTO: 9.7 FL (ref 9.2–12.9)
PMV BLD AUTO: 9.8 FL (ref 9.2–12.9)
PMV BLD AUTO: 9.9 FL (ref 9.2–12.9)
PO2 BLDA: 214 MMHG (ref 80–100)
PO2 BLDA: 47 MMHG (ref 80–100)
PO2 BLDA: 88 MMHG (ref 80–100)
POC BE: -14 MMOL/L
POC BE: 1 MMOL/L
POC BE: 4 MMOL/L
POC IONIZED CALCIUM: 1.09 MMOL/L (ref 1.06–1.42)
POC IONIZED CALCIUM: 1.1 MMOL/L (ref 1.06–1.42)
POC IONIZED CALCIUM: 1.24 MMOL/L (ref 1.06–1.42)
POC SATURATED O2: 100 % (ref 95–100)
POC SATURATED O2: 85 % (ref 95–100)
POC SATURATED O2: 96 % (ref 95–100)
POC TCO2 (MEASURED): 19 MMOL/L (ref 23–29)
POC TCO2 (MEASURED): 19 MMOL/L (ref 23–29)
POC TCO2 (MEASURED): 24 MMOL/L (ref 23–29)
POC TCO2: 14 MMOL/L (ref 23–27)
POC TCO2: 26 MMOL/L (ref 23–27)
POC TCO2: 29 MMOL/L (ref 23–27)
POCT GLUCOSE: 101 MG/DL (ref 70–110)
POCT GLUCOSE: 102 MG/DL (ref 70–110)
POCT GLUCOSE: 104 MG/DL (ref 70–110)
POCT GLUCOSE: 106 MG/DL (ref 70–110)
POCT GLUCOSE: 109 MG/DL (ref 70–110)
POCT GLUCOSE: 112 MG/DL (ref 70–110)
POCT GLUCOSE: 114 MG/DL (ref 70–110)
POCT GLUCOSE: 114 MG/DL (ref 70–110)
POCT GLUCOSE: 116 MG/DL (ref 70–110)
POCT GLUCOSE: 120 MG/DL (ref 70–110)
POCT GLUCOSE: 128 MG/DL (ref 70–110)
POCT GLUCOSE: 129 MG/DL (ref 70–110)
POCT GLUCOSE: 132 MG/DL (ref 70–110)
POCT GLUCOSE: 134 MG/DL (ref 70–110)
POCT GLUCOSE: 141 MG/DL (ref 70–110)
POCT GLUCOSE: 146 MG/DL (ref 70–110)
POCT GLUCOSE: 147 MG/DL (ref 70–110)
POCT GLUCOSE: 148 MG/DL (ref 70–110)
POCT GLUCOSE: 149 MG/DL (ref 70–110)
POCT GLUCOSE: 151 MG/DL (ref 70–110)
POCT GLUCOSE: 152 MG/DL (ref 70–110)
POCT GLUCOSE: 153 MG/DL (ref 70–110)
POCT GLUCOSE: 154 MG/DL (ref 70–110)
POCT GLUCOSE: 169 MG/DL (ref 70–110)
POCT GLUCOSE: 172 MG/DL (ref 70–110)
POCT GLUCOSE: 177 MG/DL (ref 70–110)
POCT GLUCOSE: 178 MG/DL (ref 70–110)
POCT GLUCOSE: 180 MG/DL (ref 70–110)
POCT GLUCOSE: 184 MG/DL (ref 70–110)
POCT GLUCOSE: 192 MG/DL (ref 70–110)
POCT GLUCOSE: 201 MG/DL (ref 70–110)
POCT GLUCOSE: 201 MG/DL (ref 70–110)
POCT GLUCOSE: 82 MG/DL (ref 70–110)
POCT GLUCOSE: 90 MG/DL (ref 70–110)
POCT GLUCOSE: 91 MG/DL (ref 70–110)
POCT GLUCOSE: 91 MG/DL (ref 70–110)
POCT GLUCOSE: 93 MG/DL (ref 70–110)
POCT GLUCOSE: 95 MG/DL (ref 70–110)
POCT GLUCOSE: 99 MG/DL (ref 70–110)
POIKILOCYTOSIS BLD QL SMEAR: SLIGHT
POTASSIUM BLD-SCNC: 3.6 MMOL/L (ref 3.5–5.1)
POTASSIUM BLD-SCNC: 5.1 MMOL/L (ref 3.5–5.1)
POTASSIUM BLD-SCNC: 5.3 MMOL/L (ref 3.5–5.1)
POTASSIUM SERPL-SCNC: 2.9 MMOL/L (ref 3.5–5.1)
POTASSIUM SERPL-SCNC: 3.2 MMOL/L (ref 3.5–5.1)
POTASSIUM SERPL-SCNC: 3.4 MMOL/L (ref 3.5–5.1)
POTASSIUM SERPL-SCNC: 3.5 MMOL/L (ref 3.5–5.1)
POTASSIUM SERPL-SCNC: 3.5 MMOL/L (ref 3.5–5.1)
POTASSIUM SERPL-SCNC: 3.6 MMOL/L (ref 3.5–5.1)
POTASSIUM SERPL-SCNC: 3.6 MMOL/L (ref 3.5–5.1)
POTASSIUM SERPL-SCNC: 3.7 MMOL/L (ref 3.5–5.1)
POTASSIUM SERPL-SCNC: 3.8 MMOL/L (ref 3.5–5.1)
POTASSIUM SERPL-SCNC: 3.9 MMOL/L (ref 3.5–5.1)
POTASSIUM SERPL-SCNC: 4 MMOL/L (ref 3.5–5.1)
POTASSIUM SERPL-SCNC: 4.1 MMOL/L (ref 3.5–5.1)
POTASSIUM SERPL-SCNC: 4.2 MMOL/L (ref 3.5–5.1)
POTASSIUM SERPL-SCNC: 4.2 MMOL/L (ref 3.5–5.1)
POTASSIUM SERPL-SCNC: 4.3 MMOL/L (ref 3.5–5.1)
POTASSIUM SERPL-SCNC: 4.4 MMOL/L (ref 3.5–5.1)
POTASSIUM SERPL-SCNC: 4.4 MMOL/L (ref 3.5–5.1)
POTASSIUM SERPL-SCNC: 4.5 MMOL/L (ref 3.5–5.1)
POTASSIUM SERPL-SCNC: 4.6 MMOL/L (ref 3.5–5.1)
POTASSIUM SERPL-SCNC: 4.7 MMOL/L (ref 3.5–5.1)
POTASSIUM SERPL-SCNC: 4.8 MMOL/L (ref 3.5–5.1)
POTASSIUM SERPL-SCNC: 4.9 MMOL/L (ref 3.5–5.1)
POTASSIUM SERPL-SCNC: 5 MMOL/L (ref 3.5–5.1)
POTASSIUM SERPL-SCNC: 5.1 MMOL/L (ref 3.5–5.1)
POTASSIUM SERPL-SCNC: 5.5 MMOL/L (ref 3.5–5.1)
POTASSIUM SERPL-SCNC: 5.5 MMOL/L (ref 3.5–5.1)
POTASSIUM UR-SCNC: 10 MMOL/L (ref 15–95)
PROT SERPL-MCNC: 4.7 G/DL (ref 6–8.4)
PROT SERPL-MCNC: 4.8 G/DL (ref 6–8.4)
PROT SERPL-MCNC: 5.3 G/DL (ref 6–8.4)
PROT SERPL-MCNC: 5.3 G/DL (ref 6–8.4)
PROT SERPL-MCNC: 5.5 G/DL (ref 6–8.4)
PROT SERPL-MCNC: 5.6 G/DL (ref 6–8.4)
PROT SERPL-MCNC: 5.7 G/DL (ref 6–8.4)
PROT SERPL-MCNC: 5.8 G/DL (ref 6–8.4)
PROT SERPL-MCNC: 5.9 G/DL (ref 6–8.4)
PROT SERPL-MCNC: 6 G/DL (ref 6–8.4)
PROT SERPL-MCNC: 6 G/DL (ref 6–8.4)
PROT SERPL-MCNC: 6.1 G/DL (ref 6–8.4)
PROT SERPL-MCNC: 6.2 G/DL (ref 6–8.4)
PROT SERPL-MCNC: 6.3 G/DL (ref 6–8.4)
PROT SERPL-MCNC: 6.3 G/DL (ref 6–8.4)
PROT SERPL-MCNC: 6.4 G/DL (ref 6–8.4)
PROT SERPL-MCNC: 6.5 G/DL (ref 6–8.4)
PROT SERPL-MCNC: 6.7 G/DL (ref 6–8.4)
PROT SERPL-MCNC: 6.8 G/DL (ref 6–8.4)
PROT SERPL-MCNC: 6.9 G/DL (ref 6–8.4)
PROT SERPL-MCNC: 7 G/DL (ref 6–8.4)
PROT SERPL-MCNC: 7.3 G/DL (ref 6–8.4)
PROT SERPL-MCNC: 7.4 G/DL (ref 6–8.4)
PROT SERPL-MCNC: 7.5 G/DL (ref 6–8.4)
PROT SERPL-MCNC: 7.8 G/DL (ref 6–8.4)
PROT UR QL STRIP: ABNORMAL
PROT UR QL STRIP: NEGATIVE
PROT UR-MCNC: <7 MG/DL (ref 0–15)
PROT/CREAT UR: NORMAL MG/G{CREAT} (ref 0–0.2)
PROTHROMBIN TIME: 10.3 SEC (ref 9–12.5)
PROTHROMBIN TIME: 11.6 SEC (ref 9–12.5)
PTH RELATED PROT SERPL-SCNC: 1.2 PMOL/L
PTH-INTACT SERPL-MCNC: 1495 PG/ML (ref 9–77)
PULM VEIN S/D RATIO: 1.49
PV PEAK D VEL: 0.43 M/S
PV PEAK S VEL: 0.64 M/S
QEF: 51 %
RA MAJOR: 3.82 CM
RA MAJOR: 4.29 CM
RA PRESSURE: 3 MMHG
RA PRESSURE: 8 MMHG
RA WIDTH: 2.11 CM
RA WIDTH: 2.84 CM
RBC # BLD AUTO: 2.1 M/UL (ref 4–5.4)
RBC # BLD AUTO: 2.23 M/UL (ref 4–5.4)
RBC # BLD AUTO: 2.37 M/UL (ref 4–5.4)
RBC # BLD AUTO: 2.38 M/UL (ref 4–5.4)
RBC # BLD AUTO: 2.47 M/UL (ref 4–5.4)
RBC # BLD AUTO: 2.48 M/UL (ref 4–5.4)
RBC # BLD AUTO: 2.5 M/UL (ref 4–5.4)
RBC # BLD AUTO: 2.62 M/UL (ref 4–5.4)
RBC # BLD AUTO: 2.63 M/UL (ref 4–5.4)
RBC # BLD AUTO: 2.65 M/UL (ref 4–5.4)
RBC # BLD AUTO: 2.72 M/UL (ref 4–5.4)
RBC # BLD AUTO: 2.74 M/UL (ref 4–5.4)
RBC # BLD AUTO: 2.77 M/UL (ref 4–5.4)
RBC # BLD AUTO: 2.79 M/UL (ref 4–5.4)
RBC # BLD AUTO: 2.79 M/UL (ref 4–5.4)
RBC # BLD AUTO: 2.82 M/UL (ref 4–5.4)
RBC # BLD AUTO: 2.83 M/UL (ref 4–5.4)
RBC # BLD AUTO: 2.86 M/UL (ref 4–5.4)
RBC # BLD AUTO: 2.88 M/UL (ref 4–5.4)
RBC # BLD AUTO: 2.93 M/UL (ref 4–5.4)
RBC # BLD AUTO: 2.93 M/UL (ref 4–5.4)
RBC # BLD AUTO: 2.96 M/UL (ref 4–5.4)
RBC # BLD AUTO: 2.97 M/UL (ref 4–5.4)
RBC # BLD AUTO: 3 M/UL (ref 4–5.4)
RBC # BLD AUTO: 3.01 M/UL (ref 4–5.4)
RBC # BLD AUTO: 3.02 M/UL (ref 4–5.4)
RBC # BLD AUTO: 3.03 M/UL (ref 4–5.4)
RBC # BLD AUTO: 3.03 M/UL (ref 4–5.4)
RBC # BLD AUTO: 3.06 M/UL (ref 4–5.4)
RBC # BLD AUTO: 3.11 M/UL (ref 4–5.4)
RBC # BLD AUTO: 3.15 M/UL (ref 4–5.4)
RBC # BLD AUTO: 3.16 M/UL (ref 4–5.4)
RBC # BLD AUTO: 3.18 M/UL (ref 4–5.4)
RBC # BLD AUTO: 3.2 M/UL (ref 4–5.4)
RBC # BLD AUTO: 3.2 M/UL (ref 4–5.4)
RBC # BLD AUTO: 3.22 M/UL (ref 4–5.4)
RBC # BLD AUTO: 3.23 M/UL (ref 4–5.4)
RBC # BLD AUTO: 3.25 M/UL (ref 4–5.4)
RBC # BLD AUTO: 3.25 M/UL (ref 4–5.4)
RBC # BLD AUTO: 3.26 M/UL (ref 4–5.4)
RBC # BLD AUTO: 3.33 M/UL (ref 4–5.4)
RBC # BLD AUTO: 3.35 M/UL (ref 4–5.4)
RBC # BLD AUTO: 3.36 M/UL (ref 4–5.4)
RBC # BLD AUTO: 3.43 M/UL (ref 4–5.4)
RBC # BLD AUTO: 3.44 M/UL (ref 4–5.4)
RBC # BLD AUTO: 3.45 M/UL (ref 4–5.4)
RBC # BLD AUTO: 3.46 M/UL (ref 4–5.4)
RBC # BLD AUTO: 3.57 M/UL (ref 4–5.4)
RBC #/AREA URNS AUTO: 0 /HPF (ref 0–4)
RBC #/AREA URNS AUTO: 0 /HPF (ref 0–4)
RBC #/AREA URNS AUTO: 3 /HPF (ref 0–4)
RBC #/AREA URNS HPF: 5 /HPF (ref 0–4)
RETICS/RBC NFR AUTO: 1.1 % (ref 0.5–2.5)
RETICS/RBC NFR AUTO: 1.1 % (ref 0.5–2.5)
RIGHT VENTRICULAR END-DIASTOLIC DIMENSION: 2.55 CM
RIGHT VENTRICULAR END-DIASTOLIC DIMENSION: 3.21 CM
RV TISSUE DOPPLER FREE WALL SYSTOLIC VELOCITY 1 (APICAL 4 CHAMBER VIEW): 14.35 CM/S
RV TISSUE DOPPLER FREE WALL SYSTOLIC VELOCITY 1 (APICAL 4 CHAMBER VIEW): 15.28 CM/S
SAMPLE: ABNORMAL
SARS-COV-2 RDRP RESP QL NAA+PROBE: NEGATIVE
SATURATED IRON: 24 % (ref 20–50)
SINUS: 3.01 CM
SINUS: 3.08 CM
SITE: ABNORMAL
SODIUM BLD-SCNC: 126 MMOL/L (ref 136–145)
SODIUM BLD-SCNC: 132 MMOL/L (ref 136–145)
SODIUM BLD-SCNC: 132 MMOL/L (ref 136–145)
SODIUM SERPL-SCNC: 127 MMOL/L (ref 136–145)
SODIUM SERPL-SCNC: 129 MMOL/L (ref 136–145)
SODIUM SERPL-SCNC: 131 MMOL/L (ref 136–145)
SODIUM SERPL-SCNC: 131 MMOL/L (ref 136–145)
SODIUM SERPL-SCNC: 134 MMOL/L (ref 136–145)
SODIUM SERPL-SCNC: 135 MMOL/L (ref 136–145)
SODIUM SERPL-SCNC: 136 MMOL/L (ref 136–145)
SODIUM SERPL-SCNC: 137 MMOL/L (ref 136–145)
SODIUM SERPL-SCNC: 138 MMOL/L (ref 136–145)
SODIUM SERPL-SCNC: 139 MMOL/L (ref 136–145)
SODIUM SERPL-SCNC: 140 MMOL/L (ref 136–145)
SODIUM SERPL-SCNC: 141 MMOL/L (ref 136–145)
SODIUM SERPL-SCNC: 142 MMOL/L (ref 136–145)
SODIUM SERPL-SCNC: 144 MMOL/L (ref 136–145)
SODIUM UR-SCNC: 42 MMOL/L (ref 20–250)
SODIUM UR-SCNC: 58 MMOL/L (ref 20–250)
SP GR UR STRIP: 1 (ref 1–1.03)
SP GR UR STRIP: 1.01 (ref 1–1.03)
SP02: 100
SP02: 95
SQUAMOUS #/AREA URNS AUTO: 0 /HPF
SQUAMOUS #/AREA URNS AUTO: 0 /HPF
SQUAMOUS #/AREA URNS AUTO: 1 /HPF
SQUAMOUS #/AREA URNS HPF: 10 /HPF
STJ: 2.67 CM
STJ: 3.1 CM
T4 FREE SERPL-MCNC: 0.62 NG/DL (ref 0.71–1.51)
TDI LATERAL: 0.07 M/S
TDI LATERAL: 0.08 M/S
TDI SEPTAL: 0.05 M/S
TDI SEPTAL: 0.06 M/S
TDI: 0.07 M/S
TDI: 0.07 M/S
TOTAL IRON BINDING CAPACITY: 232 UG/DL (ref 250–450)
TR MAX PG: 45 MMHG
TR MAX PG: 65 MMHG
TRANS ERYTHROCYTES VOL PATIENT: NORMAL ML
TRANSFERRIN SERPL-MCNC: 157 MG/DL (ref 200–375)
TRICUSPID ANNULAR PLANE SYSTOLIC EXCURSION: 1.67 CM
TRICUSPID ANNULAR PLANE SYSTOLIC EXCURSION: 1.94 CM
TRIGL SERPL-MCNC: 44 MG/DL (ref 30–150)
TROPONIN I SERPL DL<=0.01 NG/ML-MCNC: 1.06 NG/ML (ref 0–0.03)
TROPONIN I SERPL DL<=0.01 NG/ML-MCNC: 1.27 NG/ML (ref 0–0.03)
TROPONIN I SERPL DL<=0.01 NG/ML-MCNC: 1.95 NG/ML (ref 0–0.03)
TROPONIN I SERPL DL<=0.01 NG/ML-MCNC: 2.01 NG/ML (ref 0–0.03)
TSH SERPL DL<=0.005 MIU/L-ACNC: 4.44 UIU/ML (ref 0.4–4)
TV REST PULMONARY ARTERY PRESSURE: 48 MMHG
TV REST PULMONARY ARTERY PRESSURE: 73 MMHG
URATE SERPL-MCNC: 8.3 MG/DL (ref 2.4–5.7)
URN SPEC COLLECT METH UR: ABNORMAL
URN SPEC COLLECT METH UR: NORMAL
UROBILINOGEN UR STRIP-ACNC: NEGATIVE EU/DL
UUN UR-MCNC: 347 MG/DL (ref 140–1050)
VANCOMYCIN SERPL-MCNC: 4.3 UG/ML
VIT B1 BLD-MCNC: 35 UG/L (ref 38–122)
VIT B12 SERPL-MCNC: 1033 PG/ML (ref 210–950)
WBC # BLD AUTO: 11.19 K/UL (ref 3.9–12.7)
WBC # BLD AUTO: 11.89 K/UL (ref 3.9–12.7)
WBC # BLD AUTO: 12.46 K/UL (ref 3.9–12.7)
WBC # BLD AUTO: 12.86 K/UL (ref 3.9–12.7)
WBC # BLD AUTO: 14.26 K/UL (ref 3.9–12.7)
WBC # BLD AUTO: 14.6 K/UL (ref 3.9–12.7)
WBC # BLD AUTO: 16.29 K/UL (ref 3.9–12.7)
WBC # BLD AUTO: 16.92 K/UL (ref 3.9–12.7)
WBC # BLD AUTO: 4.7 K/UL (ref 3.9–12.7)
WBC # BLD AUTO: 4.84 K/UL (ref 3.9–12.7)
WBC # BLD AUTO: 5.07 K/UL (ref 3.9–12.7)
WBC # BLD AUTO: 5.09 K/UL (ref 3.9–12.7)
WBC # BLD AUTO: 5.17 K/UL (ref 3.9–12.7)
WBC # BLD AUTO: 5.39 K/UL (ref 3.9–12.7)
WBC # BLD AUTO: 5.39 K/UL (ref 3.9–12.7)
WBC # BLD AUTO: 5.51 K/UL (ref 3.9–12.7)
WBC # BLD AUTO: 5.61 K/UL (ref 3.9–12.7)
WBC # BLD AUTO: 5.66 K/UL (ref 3.9–12.7)
WBC # BLD AUTO: 5.75 K/UL (ref 3.9–12.7)
WBC # BLD AUTO: 5.84 K/UL (ref 3.9–12.7)
WBC # BLD AUTO: 5.85 K/UL (ref 3.9–12.7)
WBC # BLD AUTO: 6.02 K/UL (ref 3.9–12.7)
WBC # BLD AUTO: 6.04 K/UL (ref 3.9–12.7)
WBC # BLD AUTO: 6.05 K/UL (ref 3.9–12.7)
WBC # BLD AUTO: 6.14 K/UL (ref 3.9–12.7)
WBC # BLD AUTO: 6.35 K/UL (ref 3.9–12.7)
WBC # BLD AUTO: 6.46 K/UL (ref 3.9–12.7)
WBC # BLD AUTO: 6.49 K/UL (ref 3.9–12.7)
WBC # BLD AUTO: 6.59 K/UL (ref 3.9–12.7)
WBC # BLD AUTO: 6.61 K/UL (ref 3.9–12.7)
WBC # BLD AUTO: 6.67 K/UL (ref 3.9–12.7)
WBC # BLD AUTO: 6.73 K/UL (ref 3.9–12.7)
WBC # BLD AUTO: 6.77 K/UL (ref 3.9–12.7)
WBC # BLD AUTO: 6.81 K/UL (ref 3.9–12.7)
WBC # BLD AUTO: 6.91 K/UL (ref 3.9–12.7)
WBC # BLD AUTO: 6.94 K/UL (ref 3.9–12.7)
WBC # BLD AUTO: 6.98 K/UL (ref 3.9–12.7)
WBC # BLD AUTO: 7.03 K/UL (ref 3.9–12.7)
WBC # BLD AUTO: 7.08 K/UL (ref 3.9–12.7)
WBC # BLD AUTO: 7.35 K/UL (ref 3.9–12.7)
WBC # BLD AUTO: 7.39 K/UL (ref 3.9–12.7)
WBC # BLD AUTO: 7.64 K/UL (ref 3.9–12.7)
WBC # BLD AUTO: 7.66 K/UL (ref 3.9–12.7)
WBC # BLD AUTO: 7.68 K/UL (ref 3.9–12.7)
WBC # BLD AUTO: 7.69 K/UL (ref 3.9–12.7)
WBC # BLD AUTO: 7.81 K/UL (ref 3.9–12.7)
WBC # BLD AUTO: 7.91 K/UL (ref 3.9–12.7)
WBC # BLD AUTO: 8.02 K/UL (ref 3.9–12.7)
WBC # BLD AUTO: 8.21 K/UL (ref 3.9–12.7)
WBC # BLD AUTO: 8.23 K/UL (ref 3.9–12.7)
WBC # BLD AUTO: 8.28 K/UL (ref 3.9–12.7)
WBC # BLD AUTO: 9.19 K/UL (ref 3.9–12.7)
WBC # BLD AUTO: 9.6 K/UL (ref 3.9–12.7)
WBC # BLD AUTO: 9.82 K/UL (ref 3.9–12.7)
WBC # FLD: 307 /CU MM
WBC # FLD: 43 /CU MM
WBC #/AREA URNS AUTO: 0 /HPF (ref 0–5)
WBC #/AREA URNS AUTO: 53 /HPF (ref 0–5)
WBC #/AREA URNS AUTO: >100 /HPF (ref 0–5)
WBC #/AREA URNS HPF: 90 /HPF (ref 0–5)
WBC CLUMPS UR QL AUTO: ABNORMAL
YEAST UR QL AUTO: ABNORMAL

## 2021-01-01 PROCEDURE — 99497 PR ADVNCD CARE PLAN 30 MIN: ICD-10-PCS | Mod: S$GLB,,, | Performed by: NURSE PRACTITIONER

## 2021-01-01 PROCEDURE — 90945 DIALYSIS ONE EVALUATION: CPT | Mod: ,,, | Performed by: INTERNAL MEDICINE

## 2021-01-01 PROCEDURE — 63600175 PHARM REV CODE 636 W HCPCS: Mod: HCNC | Performed by: STUDENT IN AN ORGANIZED HEALTH CARE EDUCATION/TRAINING PROGRAM

## 2021-01-01 PROCEDURE — 97530 THERAPEUTIC ACTIVITIES: CPT

## 2021-01-01 PROCEDURE — 95819 EEG AWAKE AND ASLEEP: CPT | Mod: 26,,, | Performed by: PSYCHIATRY & NEUROLOGY

## 2021-01-01 PROCEDURE — 11000001 HC ACUTE MED/SURG PRIVATE ROOM

## 2021-01-01 PROCEDURE — 94761 N-INVAS EAR/PLS OXIMETRY MLT: CPT

## 2021-01-01 PROCEDURE — 84591 ASSAY OF NOS VITAMIN: CPT | Performed by: PHYSICIAN ASSISTANT

## 2021-01-01 PROCEDURE — 85025 COMPLETE CBC W/AUTO DIFF WBC: CPT | Performed by: PHYSICIAN ASSISTANT

## 2021-01-01 PROCEDURE — 25000003 PHARM REV CODE 250: Performed by: INTERNAL MEDICINE

## 2021-01-01 PROCEDURE — 63600175 PHARM REV CODE 636 W HCPCS: Mod: HCNC | Performed by: INTERNAL MEDICINE

## 2021-01-01 PROCEDURE — 93005 ELECTROCARDIOGRAM TRACING: CPT

## 2021-01-01 PROCEDURE — 83735 ASSAY OF MAGNESIUM: CPT | Performed by: EMERGENCY MEDICINE

## 2021-01-01 PROCEDURE — 85027 COMPLETE CBC AUTOMATED: CPT | Mod: HCNC

## 2021-01-01 PROCEDURE — 80069 RENAL FUNCTION PANEL: CPT | Mod: 91 | Performed by: PHYSICIAN ASSISTANT

## 2021-01-01 PROCEDURE — 36415 COLL VENOUS BLD VENIPUNCTURE: CPT | Performed by: HOSPITALIST

## 2021-01-01 PROCEDURE — 1111F DSCHRG MED/CURRENT MED MERGE: CPT | Mod: CPTII,S$GLB,, | Performed by: NURSE PRACTITIONER

## 2021-01-01 PROCEDURE — 83519 RIA NONANTIBODY: CPT | Mod: 59

## 2021-01-01 PROCEDURE — 93010 ELECTROCARDIOGRAM REPORT: CPT | Mod: ,,, | Performed by: INTERNAL MEDICINE

## 2021-01-01 PROCEDURE — 1111F PR DISCHARGE MEDS RECONCILED W/ CURRENT OUTPATIENT MED LIST: ICD-10-PCS | Mod: CPTII,S$GLB,, | Performed by: PSYCHIATRY & NEUROLOGY

## 2021-01-01 PROCEDURE — 97112 NEUROMUSCULAR REEDUCATION: CPT

## 2021-01-01 PROCEDURE — 99233 PR SUBSEQUENT HOSPITAL CARE,LEVL III: ICD-10-PCS | Mod: ,,, | Performed by: HOSPITALIST

## 2021-01-01 PROCEDURE — 83935 ASSAY OF URINE OSMOLALITY: CPT | Performed by: STUDENT IN AN ORGANIZED HEALTH CARE EDUCATION/TRAINING PROGRAM

## 2021-01-01 PROCEDURE — 1126F PR PAIN SEVERITY QUANTIFIED, NO PAIN PRESENT: ICD-10-PCS | Mod: S$GLB,,, | Performed by: SURGERY

## 2021-01-01 PROCEDURE — 82436 ASSAY OF URINE CHLORIDE: CPT | Mod: HCNC

## 2021-01-01 PROCEDURE — 85025 COMPLETE CBC W/AUTO DIFF WBC: CPT | Mod: HCNC

## 2021-01-01 PROCEDURE — 90834 PR PSYCHOTHERAPY W/PATIENT, 45 MIN: ICD-10-PCS | Mod: 95,,, | Performed by: PSYCHOLOGIST

## 2021-01-01 PROCEDURE — 25000003 PHARM REV CODE 250: Performed by: STUDENT IN AN ORGANIZED HEALTH CARE EDUCATION/TRAINING PROGRAM

## 2021-01-01 PROCEDURE — 1126F AMNT PAIN NOTED NONE PRSNT: CPT | Mod: S$GLB,,, | Performed by: INTERNAL MEDICINE

## 2021-01-01 PROCEDURE — 84100 ASSAY OF PHOSPHORUS: CPT | Performed by: HOSPITALIST

## 2021-01-01 PROCEDURE — 85025 COMPLETE CBC W/AUTO DIFF WBC: CPT | Performed by: HOSPITALIST

## 2021-01-01 PROCEDURE — 99214 OFFICE O/P EST MOD 30 MIN: CPT | Mod: S$GLB,,, | Performed by: HOSPITALIST

## 2021-01-01 PROCEDURE — 3288F FALL RISK ASSESSMENT DOCD: CPT | Mod: CPTII,S$GLB,, | Performed by: HOSPITALIST

## 2021-01-01 PROCEDURE — 63600175 PHARM REV CODE 636 W HCPCS: Performed by: HOSPITALIST

## 2021-01-01 PROCEDURE — 1159F MED LIST DOCD IN RCRD: CPT | Mod: CPTII,95,, | Performed by: PSYCHOLOGIST

## 2021-01-01 PROCEDURE — 99223 PR INITIAL HOSPITAL CARE,LEVL III: ICD-10-PCS | Mod: ,,, | Performed by: NURSE PRACTITIONER

## 2021-01-01 PROCEDURE — 80053 COMPREHEN METABOLIC PANEL: CPT | Mod: HCNC

## 2021-01-01 PROCEDURE — 99233 SBSQ HOSP IP/OBS HIGH 50: CPT | Mod: ,,, | Performed by: HOSPITALIST

## 2021-01-01 PROCEDURE — 99233 PR SUBSEQUENT HOSPITAL CARE,LEVL III: ICD-10-PCS | Mod: ,,, | Performed by: PSYCHIATRY & NEUROLOGY

## 2021-01-01 PROCEDURE — 25000003 PHARM REV CODE 250: Performed by: NURSE ANESTHETIST, CERTIFIED REGISTERED

## 2021-01-01 PROCEDURE — 25000242 PHARM REV CODE 250 ALT 637 W/ HCPCS: Performed by: PHYSICIAN ASSISTANT

## 2021-01-01 PROCEDURE — 97535 SELF CARE MNGMENT TRAINING: CPT | Mod: HCNC

## 2021-01-01 PROCEDURE — 3077F SYST BP >= 140 MM HG: CPT | Mod: CPTII,S$GLB,, | Performed by: SURGERY

## 2021-01-01 PROCEDURE — 25000003 PHARM REV CODE 250: Performed by: NURSE PRACTITIONER

## 2021-01-01 PROCEDURE — 20600001 HC STEP DOWN PRIVATE ROOM

## 2021-01-01 PROCEDURE — 99215 OFFICE O/P EST HI 40 MIN: CPT | Mod: S$GLB,,, | Performed by: HOSPITALIST

## 2021-01-01 PROCEDURE — 83735 ASSAY OF MAGNESIUM: CPT | Performed by: STUDENT IN AN ORGANIZED HEALTH CARE EDUCATION/TRAINING PROGRAM

## 2021-01-01 PROCEDURE — 99233 PR SUBSEQUENT HOSPITAL CARE,LEVL III: ICD-10-PCS | Mod: ,,, | Performed by: INTERNAL MEDICINE

## 2021-01-01 PROCEDURE — G0378 HOSPITAL OBSERVATION PER HR: HCPCS | Mod: HCNC

## 2021-01-01 PROCEDURE — 85014 HEMATOCRIT: CPT | Performed by: PHYSICIAN ASSISTANT

## 2021-01-01 PROCEDURE — 86920 COMPATIBILITY TEST SPIN: CPT | Performed by: STUDENT IN AN ORGANIZED HEALTH CARE EDUCATION/TRAINING PROGRAM

## 2021-01-01 PROCEDURE — 99499 UNLISTED E&M SERVICE: CPT | Mod: S$GLB,,, | Performed by: INTERNAL MEDICINE

## 2021-01-01 PROCEDURE — 99900026 HC AIRWAY MAINTENANCE (STAT)

## 2021-01-01 PROCEDURE — 83735 ASSAY OF MAGNESIUM: CPT | Mod: HCNC

## 2021-01-01 PROCEDURE — 99900035 HC TECH TIME PER 15 MIN (STAT)

## 2021-01-01 PROCEDURE — 1101F PR PT FALLS ASSESS DOC 0-1 FALLS W/OUT INJ PAST YR: ICD-10-PCS | Mod: CPTII,S$GLB,, | Performed by: INTERNAL MEDICINE

## 2021-01-01 PROCEDURE — 1126F AMNT PAIN NOTED NONE PRSNT: CPT | Mod: S$GLB,,, | Performed by: HOSPITALIST

## 2021-01-01 PROCEDURE — 84100 ASSAY OF PHOSPHORUS: CPT | Performed by: EMERGENCY MEDICINE

## 2021-01-01 PROCEDURE — 3288F FALL RISK ASSESSMENT DOCD: CPT | Mod: CPTII,S$GLB,, | Performed by: PSYCHIATRY & NEUROLOGY

## 2021-01-01 PROCEDURE — 93010 EKG 12-LEAD: ICD-10-PCS | Mod: ,,, | Performed by: INTERNAL MEDICINE

## 2021-01-01 PROCEDURE — 99232 SBSQ HOSP IP/OBS MODERATE 35: CPT | Mod: GW,,, | Performed by: INTERNAL MEDICINE

## 2021-01-01 PROCEDURE — 84300 ASSAY OF URINE SODIUM: CPT | Mod: HCNC

## 2021-01-01 PROCEDURE — 99499 RISK ADDL DX/OHS AUDIT: ICD-10-PCS | Mod: 95,,, | Performed by: HOSPITALIST

## 2021-01-01 PROCEDURE — 3078F PR MOST RECENT DIASTOLIC BLOOD PRESSURE < 80 MM HG: ICD-10-PCS | Mod: CPTII,S$GLB,, | Performed by: HOSPITALIST

## 2021-01-01 PROCEDURE — 25000003 PHARM REV CODE 250: Performed by: HOSPITALIST

## 2021-01-01 PROCEDURE — 81001 URINALYSIS AUTO W/SCOPE: CPT | Mod: HCNC

## 2021-01-01 PROCEDURE — 99999 PR PBB SHADOW E&M-EST. PATIENT-LVL III: ICD-10-PCS | Mod: PBBFAC,,, | Performed by: INTERNAL MEDICINE

## 2021-01-01 PROCEDURE — 1159F MED LIST DOCD IN RCRD: CPT | Mod: S$GLB,,, | Performed by: NURSE PRACTITIONER

## 2021-01-01 PROCEDURE — 25000003 PHARM REV CODE 250: Mod: HCNC | Performed by: STUDENT IN AN ORGANIZED HEALTH CARE EDUCATION/TRAINING PROGRAM

## 2021-01-01 PROCEDURE — 83735 ASSAY OF MAGNESIUM: CPT

## 2021-01-01 PROCEDURE — 99999 PR PBB SHADOW E&M-EST. PATIENT-LVL IV: CPT | Mod: PBBFAC,,, | Performed by: INTERNAL MEDICINE

## 2021-01-01 PROCEDURE — 80048 BASIC METABOLIC PNL TOTAL CA: CPT | Performed by: PHYSICIAN ASSISTANT

## 2021-01-01 PROCEDURE — 80048 BASIC METABOLIC PNL TOTAL CA: CPT | Performed by: EMERGENCY MEDICINE

## 2021-01-01 PROCEDURE — 99497 ADVNCD CARE PLAN 30 MIN: CPT | Mod: ,,, | Performed by: INTERNAL MEDICINE

## 2021-01-01 PROCEDURE — 96360 HYDRATION IV INFUSION INIT: CPT

## 2021-01-01 PROCEDURE — 99233 SBSQ HOSP IP/OBS HIGH 50: CPT | Mod: ,,, | Performed by: INTERNAL MEDICINE

## 2021-01-01 PROCEDURE — 99214 PR OFFICE/OUTPT VISIT, EST, LEVL IV, 30-39 MIN: ICD-10-PCS | Mod: 95,,, | Performed by: HOSPITALIST

## 2021-01-01 PROCEDURE — 87102 FUNGUS ISOLATION CULTURE: CPT | Performed by: PHYSICIAN ASSISTANT

## 2021-01-01 PROCEDURE — 99214 PR OFFICE/OUTPT VISIT, EST, LEVL IV, 30-39 MIN: ICD-10-PCS | Mod: HCNC,S$GLB,, | Performed by: INTERNAL MEDICINE

## 2021-01-01 PROCEDURE — 84100 ASSAY OF PHOSPHORUS: CPT | Performed by: PHYSICIAN ASSISTANT

## 2021-01-01 PROCEDURE — 99291 CRITICAL CARE FIRST HOUR: CPT | Mod: ,,, | Performed by: EMERGENCY MEDICINE

## 2021-01-01 PROCEDURE — 82570 ASSAY OF URINE CREATININE: CPT | Mod: HCNC

## 2021-01-01 PROCEDURE — 99233 SBSQ HOSP IP/OBS HIGH 50: CPT | Mod: HCNC,,, | Performed by: INTERNAL MEDICINE

## 2021-01-01 PROCEDURE — 83690 ASSAY OF LIPASE: CPT | Performed by: EMERGENCY MEDICINE

## 2021-01-01 PROCEDURE — 99499 RISK ADDL DX/OHS AUDIT: ICD-10-PCS | Mod: S$GLB,,, | Performed by: INTERNAL MEDICINE

## 2021-01-01 PROCEDURE — 99285 PR EMERGENCY DEPT VISIT,LEVEL V: ICD-10-PCS | Mod: CS,,, | Performed by: EMERGENCY MEDICINE

## 2021-01-01 PROCEDURE — 1111F PR DISCHARGE MEDS RECONCILED W/ CURRENT OUTPATIENT MED LIST: ICD-10-PCS | Mod: CPTII,,, | Performed by: PHYSICIAN ASSISTANT

## 2021-01-01 PROCEDURE — 99213 PR OFFICE/OUTPT VISIT, EST, LEVL III, 20-29 MIN: ICD-10-PCS | Mod: S$GLB,,, | Performed by: SURGERY

## 2021-01-01 PROCEDURE — 99350 HOME/RES VST EST HIGH MDM 60: CPT | Mod: S$GLB,,, | Performed by: NURSE PRACTITIONER

## 2021-01-01 PROCEDURE — 99223 PR INITIAL HOSPITAL CARE,LEVL III: ICD-10-PCS | Mod: AI,,, | Performed by: HOSPITALIST

## 2021-01-01 PROCEDURE — 85025 COMPLETE CBC W/AUTO DIFF WBC: CPT | Mod: 91 | Performed by: PHYSICIAN ASSISTANT

## 2021-01-01 PROCEDURE — 87077 CULTURE AEROBIC IDENTIFY: CPT | Mod: HCNC

## 2021-01-01 PROCEDURE — 80053 COMPREHEN METABOLIC PANEL: CPT | Performed by: HOSPITALIST

## 2021-01-01 PROCEDURE — 3079F DIAST BP 80-89 MM HG: CPT | Mod: CPTII,S$GLB,, | Performed by: HOSPITALIST

## 2021-01-01 PROCEDURE — 90945 PR DIALYSIS, NOT HEMO, 1 EVAL: ICD-10-PCS | Mod: ,,, | Performed by: INTERNAL MEDICINE

## 2021-01-01 PROCEDURE — 99214 OFFICE O/P EST MOD 30 MIN: CPT | Mod: 95,,, | Performed by: HOSPITALIST

## 2021-01-01 PROCEDURE — 36600 WITHDRAWAL OF ARTERIAL BLOOD: CPT

## 2021-01-01 PROCEDURE — 1101F PT FALLS ASSESS-DOCD LE1/YR: CPT | Mod: CPTII,S$GLB,, | Performed by: HOSPITALIST

## 2021-01-01 PROCEDURE — 99285 EMERGENCY DEPT VISIT HI MDM: CPT | Mod: CS,,, | Performed by: EMERGENCY MEDICINE

## 2021-01-01 PROCEDURE — 83735 ASSAY OF MAGNESIUM: CPT | Performed by: PHYSICIAN ASSISTANT

## 2021-01-01 PROCEDURE — 1111F DSCHRG MED/CURRENT MED MERGE: CPT | Mod: CPTII,S$GLB,, | Performed by: PSYCHIATRY & NEUROLOGY

## 2021-01-01 PROCEDURE — 1159F PR MEDICATION LIST DOCUMENTED IN MEDICAL RECORD: ICD-10-PCS | Mod: S$GLB,,, | Performed by: NURSE PRACTITIONER

## 2021-01-01 PROCEDURE — 83735 ASSAY OF MAGNESIUM: CPT | Performed by: HOSPITALIST

## 2021-01-01 PROCEDURE — 25000003 PHARM REV CODE 250: Mod: HCNC | Performed by: INTERNAL MEDICINE

## 2021-01-01 PROCEDURE — 25000003 PHARM REV CODE 250: Performed by: PHYSICIAN ASSISTANT

## 2021-01-01 PROCEDURE — 90945 DIALYSIS ONE EVALUATION: CPT

## 2021-01-01 PROCEDURE — P9021 RED BLOOD CELLS UNIT: HCPCS | Performed by: STUDENT IN AN ORGANIZED HEALTH CARE EDUCATION/TRAINING PROGRAM

## 2021-01-01 PROCEDURE — 27000221 HC OXYGEN, UP TO 24 HOURS

## 2021-01-01 PROCEDURE — 1101F PR PT FALLS ASSESS DOC 0-1 FALLS W/OUT INJ PAST YR: ICD-10-PCS | Mod: CPTII,S$GLB,, | Performed by: HOSPITALIST

## 2021-01-01 PROCEDURE — 36415 COLL VENOUS BLD VENIPUNCTURE: CPT | Mod: HCNC

## 2021-01-01 PROCEDURE — 99233 PR SUBSEQUENT HOSPITAL CARE,LEVL III: ICD-10-PCS | Mod: GC,,, | Performed by: INTERNAL MEDICINE

## 2021-01-01 PROCEDURE — 99223 1ST HOSP IP/OBS HIGH 75: CPT | Mod: AI,,, | Performed by: HOSPITALIST

## 2021-01-01 PROCEDURE — 99497 PR ADVNCD CARE PLAN 30 MIN: ICD-10-PCS | Mod: 25,,, | Performed by: NURSE PRACTITIONER

## 2021-01-01 PROCEDURE — 80053 COMPREHEN METABOLIC PANEL: CPT | Performed by: INTERNAL MEDICINE

## 2021-01-01 PROCEDURE — 3288F PR FALLS RISK ASSESSMENT DOCUMENTED: ICD-10-PCS | Mod: HCNC,CPTII,S$GLB, | Performed by: INTERNAL MEDICINE

## 2021-01-01 PROCEDURE — 99233 PR SUBSEQUENT HOSPITAL CARE,LEVL III: ICD-10-PCS | Mod: HCNC,,, | Performed by: HOSPITALIST

## 2021-01-01 PROCEDURE — 99239 PR HOSPITAL DISCHARGE DAY,>30 MIN: ICD-10-PCS | Mod: ,,, | Performed by: HOSPITALIST

## 2021-01-01 PROCEDURE — 95720 EEG PHY/QHP EA INCR W/VEEG: CPT | Mod: ,,, | Performed by: PSYCHIATRY & NEUROLOGY

## 2021-01-01 PROCEDURE — 99232 SBSQ HOSP IP/OBS MODERATE 35: CPT | Mod: ,,, | Performed by: HOSPITALIST

## 2021-01-01 PROCEDURE — 99231 PR SUBSEQUENT HOSPITAL CARE,LEVL I: ICD-10-PCS | Mod: ,,, | Performed by: INTERNAL MEDICINE

## 2021-01-01 PROCEDURE — 63600175 PHARM REV CODE 636 W HCPCS: Performed by: STUDENT IN AN ORGANIZED HEALTH CARE EDUCATION/TRAINING PROGRAM

## 2021-01-01 PROCEDURE — 87088 URINE BACTERIA CULTURE: CPT | Mod: HCNC

## 2021-01-01 PROCEDURE — D9220A PRA ANESTHESIA: Mod: ,,, | Performed by: ANESTHESIOLOGY

## 2021-01-01 PROCEDURE — 99285 EMERGENCY DEPT VISIT HI MDM: CPT | Mod: 25,HCNC

## 2021-01-01 PROCEDURE — 1159F MED LIST DOCD IN RCRD: CPT | Mod: CPTII,S$GLB,, | Performed by: NURSE PRACTITIONER

## 2021-01-01 PROCEDURE — 27000221 HC OXYGEN, UP TO 24 HOURS: Mod: HCNC

## 2021-01-01 PROCEDURE — 36415 COLL VENOUS BLD VENIPUNCTURE: CPT | Performed by: INTERNAL MEDICINE

## 2021-01-01 PROCEDURE — 92523 SPEECH SOUND LANG COMPREHEN: CPT

## 2021-01-01 PROCEDURE — 99499 UNLISTED E&M SERVICE: CPT | Mod: S$GLB,,, | Performed by: HOSPITALIST

## 2021-01-01 PROCEDURE — 99233 SBSQ HOSP IP/OBS HIGH 50: CPT | Mod: ,,, | Performed by: NURSE PRACTITIONER

## 2021-01-01 PROCEDURE — 87210 SMEAR WET MOUNT SALINE/INK: CPT | Performed by: PHYSICIAN ASSISTANT

## 2021-01-01 PROCEDURE — 80053 COMPREHEN METABOLIC PANEL: CPT

## 2021-01-01 PROCEDURE — 99499 UNLISTED E&M SERVICE: CPT | Mod: 95,,, | Performed by: HOSPITALIST

## 2021-01-01 PROCEDURE — 90834 PSYTX W PT 45 MINUTES: CPT | Mod: 95,,, | Performed by: PSYCHOLOGIST

## 2021-01-01 PROCEDURE — 99291 PR CRITICAL CARE, E/M 30-74 MINUTES: ICD-10-PCS | Mod: ,,, | Performed by: PSYCHIATRY & NEUROLOGY

## 2021-01-01 PROCEDURE — 63600175 PHARM REV CODE 636 W HCPCS: Mod: HCNC | Performed by: HOSPITALIST

## 2021-01-01 PROCEDURE — 99999 PR PBB SHADOW E&M-EST. PATIENT-LVL V: CPT | Mod: PBBFAC,,, | Performed by: INTERNAL MEDICINE

## 2021-01-01 PROCEDURE — 99223 1ST HOSP IP/OBS HIGH 75: CPT | Mod: ,,, | Performed by: INTERNAL MEDICINE

## 2021-01-01 PROCEDURE — 3288F PR FALLS RISK ASSESSMENT DOCUMENTED: ICD-10-PCS | Mod: CPTII,S$GLB,, | Performed by: HOSPITALIST

## 2021-01-01 PROCEDURE — U0002 COVID-19 LAB TEST NON-CDC: HCPCS | Performed by: PHYSICIAN ASSISTANT

## 2021-01-01 PROCEDURE — 99223 PR INITIAL HOSPITAL CARE,LEVL III: ICD-10-PCS | Mod: AI,GC,, | Performed by: STUDENT IN AN ORGANIZED HEALTH CARE EDUCATION/TRAINING PROGRAM

## 2021-01-01 PROCEDURE — 80069 RENAL FUNCTION PANEL: CPT | Mod: HCNC

## 2021-01-01 PROCEDURE — 84100 ASSAY OF PHOSPHORUS: CPT | Mod: HCNC

## 2021-01-01 PROCEDURE — 89051 BODY FLUID CELL COUNT: CPT | Performed by: PHYSICIAN ASSISTANT

## 2021-01-01 PROCEDURE — 36000708 HC OR TIME LEV III 1ST 15 MIN: Performed by: SURGERY

## 2021-01-01 PROCEDURE — 99350 PR HOME VISIT,ESTAB PATIENT,LEVEL IV: ICD-10-PCS | Mod: S$GLB,,, | Performed by: NURSE PRACTITIONER

## 2021-01-01 PROCEDURE — 1159F PR MEDICATION LIST DOCUMENTED IN MEDICAL RECORD: ICD-10-PCS | Mod: S$GLB,,, | Performed by: INTERNAL MEDICINE

## 2021-01-01 PROCEDURE — 80202 ASSAY OF VANCOMYCIN: CPT | Performed by: PSYCHIATRY & NEUROLOGY

## 2021-01-01 PROCEDURE — S0030 INJECTION, METRONIDAZOLE: HCPCS | Performed by: NURSE PRACTITIONER

## 2021-01-01 PROCEDURE — 99284 PR EMERGENCY DEPT VISIT,LEVEL IV: ICD-10-PCS | Mod: ,,, | Performed by: INTERNAL MEDICINE

## 2021-01-01 PROCEDURE — 99214 PR OFFICE/OUTPT VISIT, EST, LEVL IV, 30-39 MIN: ICD-10-PCS | Mod: S$GLB,,, | Performed by: INTERNAL MEDICINE

## 2021-01-01 PROCEDURE — 20000000 HC ICU ROOM

## 2021-01-01 PROCEDURE — 99285 EMERGENCY DEPT VISIT HI MDM: CPT | Mod: 25

## 2021-01-01 PROCEDURE — 37000008 HC ANESTHESIA 1ST 15 MINUTES: Performed by: SURGERY

## 2021-01-01 PROCEDURE — 99223 PR INITIAL HOSPITAL CARE,LEVL III: ICD-10-PCS | Mod: ,,, | Performed by: INTERNAL MEDICINE

## 2021-01-01 PROCEDURE — 99214 PR OFFICE/OUTPT VISIT, EST, LEVL IV, 30-39 MIN: ICD-10-PCS | Mod: S$GLB,,, | Performed by: PSYCHIATRY & NEUROLOGY

## 2021-01-01 PROCEDURE — 63600175 PHARM REV CODE 636 W HCPCS: Mod: JG | Performed by: HOSPITALIST

## 2021-01-01 PROCEDURE — 99233 SBSQ HOSP IP/OBS HIGH 50: CPT | Mod: ,,, | Performed by: PSYCHIATRY & NEUROLOGY

## 2021-01-01 PROCEDURE — 84484 ASSAY OF TROPONIN QUANT: CPT | Performed by: STUDENT IN AN ORGANIZED HEALTH CARE EDUCATION/TRAINING PROGRAM

## 2021-01-01 PROCEDURE — 96374 THER/PROPH/DIAG INJ IV PUSH: CPT

## 2021-01-01 PROCEDURE — 82803 BLOOD GASES ANY COMBINATION: CPT

## 2021-01-01 PROCEDURE — 97161 PT EVAL LOW COMPLEX 20 MIN: CPT | Mod: HCNC

## 2021-01-01 PROCEDURE — 20600001 HC STEP DOWN PRIVATE ROOM: Mod: HCNC

## 2021-01-01 PROCEDURE — 27200950 HC CAPD SUPPORT

## 2021-01-01 PROCEDURE — 85045 AUTOMATED RETICULOCYTE COUNT: CPT | Mod: HCNC

## 2021-01-01 PROCEDURE — 85025 COMPLETE CBC W/AUTO DIFF WBC: CPT

## 2021-01-01 PROCEDURE — 84100 ASSAY OF PHOSPHORUS: CPT | Performed by: STUDENT IN AN ORGANIZED HEALTH CARE EDUCATION/TRAINING PROGRAM

## 2021-01-01 PROCEDURE — 99215 OFFICE O/P EST HI 40 MIN: CPT | Mod: S$GLB,,, | Performed by: INTERNAL MEDICINE

## 2021-01-01 PROCEDURE — 99999 PR PBB SHADOW E&M-EST. PATIENT-LVL V: ICD-10-PCS | Mod: PBBFAC,,, | Performed by: HOSPITALIST

## 2021-01-01 PROCEDURE — 84540 ASSAY OF URINE/UREA-N: CPT | Mod: HCNC

## 2021-01-01 PROCEDURE — 3078F PR MOST RECENT DIASTOLIC BLOOD PRESSURE < 80 MM HG: ICD-10-PCS | Mod: CPTII,S$GLB,, | Performed by: INTERNAL MEDICINE

## 2021-01-01 PROCEDURE — 97116 GAIT TRAINING THERAPY: CPT

## 2021-01-01 PROCEDURE — 83010 ASSAY OF HAPTOGLOBIN QUANT: CPT | Mod: HCNC

## 2021-01-01 PROCEDURE — G0378 HOSPITAL OBSERVATION PER HR: HCPCS

## 2021-01-01 PROCEDURE — 84100 ASSAY OF PHOSPHORUS: CPT

## 2021-01-01 PROCEDURE — 63600175 PHARM REV CODE 636 W HCPCS: Performed by: ANESTHESIOLOGY

## 2021-01-01 PROCEDURE — 99223 1ST HOSP IP/OBS HIGH 75: CPT | Mod: ,,, | Performed by: NURSE PRACTITIONER

## 2021-01-01 PROCEDURE — 87070 CULTURE OTHR SPECIMN AEROBIC: CPT | Mod: 59 | Performed by: EMERGENCY MEDICINE

## 2021-01-01 PROCEDURE — 99217 PR OBSERVATION CARE DISCHARGE: CPT | Mod: ,,, | Performed by: PHYSICIAN ASSISTANT

## 2021-01-01 PROCEDURE — 85025 COMPLETE CBC W/AUTO DIFF WBC: CPT | Performed by: STUDENT IN AN ORGANIZED HEALTH CARE EDUCATION/TRAINING PROGRAM

## 2021-01-01 PROCEDURE — 3077F PR MOST RECENT SYSTOLIC BLOOD PRESSURE >= 140 MM HG: ICD-10-PCS | Mod: CPTII,S$GLB,, | Performed by: INTERNAL MEDICINE

## 2021-01-01 PROCEDURE — 1159F MED LIST DOCD IN RCRD: CPT | Mod: S$GLB,,, | Performed by: INTERNAL MEDICINE

## 2021-01-01 PROCEDURE — 93005 ELECTROCARDIOGRAM TRACING: CPT | Mod: 59

## 2021-01-01 PROCEDURE — 99213 OFFICE O/P EST LOW 20 MIN: CPT | Mod: S$GLB,,, | Performed by: SURGERY

## 2021-01-01 PROCEDURE — G0180 MD CERTIFICATION HHA PATIENT: HCPCS | Mod: ,,, | Performed by: HOSPITALIST

## 2021-01-01 PROCEDURE — 80069 RENAL FUNCTION PANEL: CPT | Performed by: PHYSICIAN ASSISTANT

## 2021-01-01 PROCEDURE — 99215 PR OFFICE/OUTPT VISIT, EST, LEVL V, 40-54 MIN: ICD-10-PCS | Mod: ,,, | Performed by: HOSPITALIST

## 2021-01-01 PROCEDURE — 83615 LACTATE (LD) (LDH) ENZYME: CPT | Performed by: INTERNAL MEDICINE

## 2021-01-01 PROCEDURE — 93005 ELECTROCARDIOGRAM TRACING: CPT | Mod: HCNC

## 2021-01-01 PROCEDURE — 1101F PT FALLS ASSESS-DOCD LE1/YR: CPT | Mod: CPTII,S$GLB,, | Performed by: PSYCHIATRY & NEUROLOGY

## 2021-01-01 PROCEDURE — 25000003 PHARM REV CODE 250: Performed by: PSYCHIATRY & NEUROLOGY

## 2021-01-01 PROCEDURE — 63600175 PHARM REV CODE 636 W HCPCS: Performed by: PHYSICIAN ASSISTANT

## 2021-01-01 PROCEDURE — 99900035 HC TECH TIME PER 15 MIN (STAT): Mod: HCNC

## 2021-01-01 PROCEDURE — 87205 SMEAR GRAM STAIN: CPT | Performed by: GENERAL PRACTICE

## 2021-01-01 PROCEDURE — 85610 PROTHROMBIN TIME: CPT | Performed by: PHYSICIAN ASSISTANT

## 2021-01-01 PROCEDURE — 97168 OT RE-EVAL EST PLAN CARE: CPT

## 2021-01-01 PROCEDURE — 63600175 PHARM REV CODE 636 W HCPCS: Mod: HCNC | Performed by: EMERGENCY MEDICINE

## 2021-01-01 PROCEDURE — 63600175 PHARM REV CODE 636 W HCPCS: Performed by: NURSE ANESTHETIST, CERTIFIED REGISTERED

## 2021-01-01 PROCEDURE — 63600175 PHARM REV CODE 636 W HCPCS

## 2021-01-01 PROCEDURE — 96365 THER/PROPH/DIAG IV INF INIT: CPT

## 2021-01-01 PROCEDURE — 82330 ASSAY OF CALCIUM: CPT

## 2021-01-01 PROCEDURE — 87086 URINE CULTURE/COLONY COUNT: CPT | Mod: HCNC

## 2021-01-01 PROCEDURE — 80053 COMPREHEN METABOLIC PANEL: CPT | Performed by: EMERGENCY MEDICINE

## 2021-01-01 PROCEDURE — 82962 GLUCOSE BLOOD TEST: CPT

## 2021-01-01 PROCEDURE — 84484 ASSAY OF TROPONIN QUANT: CPT | Performed by: PHYSICIAN ASSISTANT

## 2021-01-01 PROCEDURE — 82607 VITAMIN B-12: CPT | Performed by: PHYSICIAN ASSISTANT

## 2021-01-01 PROCEDURE — 99999 PR PBB SHADOW E&M-EST. PATIENT-LVL IV: CPT | Mod: PBBFAC,HCNC,, | Performed by: INTERNAL MEDICINE

## 2021-01-01 PROCEDURE — 85730 THROMBOPLASTIN TIME PARTIAL: CPT | Performed by: PHYSICIAN ASSISTANT

## 2021-01-01 PROCEDURE — 93010 ELECTROCARDIOGRAM REPORT: CPT | Mod: HCNC,,, | Performed by: INTERNAL MEDICINE

## 2021-01-01 PROCEDURE — 83970 ASSAY OF PARATHORMONE: CPT | Mod: HCNC

## 2021-01-01 PROCEDURE — 3288F PR FALLS RISK ASSESSMENT DOCUMENTED: ICD-10-PCS | Mod: CPTII,S$GLB,, | Performed by: INTERNAL MEDICINE

## 2021-01-01 PROCEDURE — 80053 COMPREHEN METABOLIC PANEL: CPT | Performed by: STUDENT IN AN ORGANIZED HEALTH CARE EDUCATION/TRAINING PROGRAM

## 2021-01-01 PROCEDURE — 99232 PR SUBSEQUENT HOSPITAL CARE,LEVL II: ICD-10-PCS | Mod: 95,,, | Performed by: PSYCHIATRY & NEUROLOGY

## 2021-01-01 PROCEDURE — 97535 SELF CARE MNGMENT TRAINING: CPT

## 2021-01-01 PROCEDURE — 25000003 PHARM REV CODE 250: Mod: HCNC | Performed by: HOSPITALIST

## 2021-01-01 PROCEDURE — 86900 BLOOD TYPING SEROLOGIC ABO: CPT | Performed by: PHYSICIAN ASSISTANT

## 2021-01-01 PROCEDURE — 99220 PR INITIAL OBSERVATION CARE,LEVL III: CPT | Mod: ,,, | Performed by: PHYSICIAN ASSISTANT

## 2021-01-01 PROCEDURE — 99232 PR SUBSEQUENT HOSPITAL CARE,LEVL II: ICD-10-PCS | Mod: ,,, | Performed by: HOSPITALIST

## 2021-01-01 PROCEDURE — 99233 SBSQ HOSP IP/OBS HIGH 50: CPT | Mod: HCNC,,, | Performed by: HOSPITALIST

## 2021-01-01 PROCEDURE — 96360 HYDRATION IV INFUSION INIT: CPT | Mod: HCNC

## 2021-01-01 PROCEDURE — 1100F PTFALLS ASSESS-DOCD GE2>/YR: CPT | Mod: HCNC,CPTII,S$GLB, | Performed by: INTERNAL MEDICINE

## 2021-01-01 PROCEDURE — 84439 ASSAY OF FREE THYROXINE: CPT | Performed by: EMERGENCY MEDICINE

## 2021-01-01 PROCEDURE — 1126F PR PAIN SEVERITY QUANTIFIED, NO PAIN PRESENT: ICD-10-PCS | Mod: S$GLB,,, | Performed by: HOSPITALIST

## 2021-01-01 PROCEDURE — 36415 COLL VENOUS BLD VENIPUNCTURE: CPT | Performed by: PHYSICIAN ASSISTANT

## 2021-01-01 PROCEDURE — 83036 HEMOGLOBIN GLYCOSYLATED A1C: CPT | Performed by: PHYSICIAN ASSISTANT

## 2021-01-01 PROCEDURE — 25000003 PHARM REV CODE 250: Performed by: EMERGENCY MEDICINE

## 2021-01-01 PROCEDURE — 99232 SBSQ HOSP IP/OBS MODERATE 35: CPT | Mod: 95,,, | Performed by: PSYCHIATRY & NEUROLOGY

## 2021-01-01 PROCEDURE — 99214 PR OFFICE/OUTPT VISIT, EST, LEVL IV, 30-39 MIN: ICD-10-PCS | Mod: HCNC,95,, | Performed by: INTERNAL MEDICINE

## 2021-01-01 PROCEDURE — G0180 PR HOME HEALTH MD CERTIFICATION: ICD-10-PCS | Mod: ,,, | Performed by: HOSPITALIST

## 2021-01-01 PROCEDURE — 3074F PR MOST RECENT SYSTOLIC BLOOD PRESSURE < 130 MM HG: ICD-10-PCS | Mod: CPTII,S$GLB,, | Performed by: INTERNAL MEDICINE

## 2021-01-01 PROCEDURE — 99226 PR SUBSEQUENT OBSERVATION CARE,LEVEL III: ICD-10-PCS | Mod: ,,, | Performed by: PHYSICIAN ASSISTANT

## 2021-01-01 PROCEDURE — 99233 PR SUBSEQUENT HOSPITAL CARE,LEVL III: ICD-10-PCS | Mod: ,,, | Performed by: NURSE PRACTITIONER

## 2021-01-01 PROCEDURE — 99239 HOSP IP/OBS DSCHRG MGMT >30: CPT | Mod: GC,,, | Performed by: STUDENT IN AN ORGANIZED HEALTH CARE EDUCATION/TRAINING PROGRAM

## 2021-01-01 PROCEDURE — 97530 THERAPEUTIC ACTIVITIES: CPT | Mod: HCNC

## 2021-01-01 PROCEDURE — 80048 BASIC METABOLIC PNL TOTAL CA: CPT | Mod: HCNC

## 2021-01-01 PROCEDURE — 83930 ASSAY OF BLOOD OSMOLALITY: CPT | Performed by: PHYSICIAN ASSISTANT

## 2021-01-01 PROCEDURE — 99291 CRITICAL CARE FIRST HOUR: CPT | Mod: 25

## 2021-01-01 PROCEDURE — 1159F PR MEDICATION LIST DOCUMENTED IN MEDICAL RECORD: ICD-10-PCS | Mod: HCNC,95,, | Performed by: INTERNAL MEDICINE

## 2021-01-01 PROCEDURE — 3078F PR MOST RECENT DIASTOLIC BLOOD PRESSURE < 80 MM HG: ICD-10-PCS | Mod: HCNC,CPTII,S$GLB, | Performed by: INTERNAL MEDICINE

## 2021-01-01 PROCEDURE — 99231 SBSQ HOSP IP/OBS SF/LOW 25: CPT | Mod: ,,, | Performed by: INTERNAL MEDICINE

## 2021-01-01 PROCEDURE — 87070 CULTURE OTHR SPECIMN AEROBIC: CPT | Performed by: PHYSICIAN ASSISTANT

## 2021-01-01 PROCEDURE — 99499 NO LOS: ICD-10-PCS | Mod: ,,, | Performed by: EMERGENCY MEDICINE

## 2021-01-01 PROCEDURE — 63600175 PHARM REV CODE 636 W HCPCS: Performed by: NURSE PRACTITIONER

## 2021-01-01 PROCEDURE — 3074F SYST BP LT 130 MM HG: CPT | Mod: CPTII,S$GLB,, | Performed by: INTERNAL MEDICINE

## 2021-01-01 PROCEDURE — 1100F PTFALLS ASSESS-DOCD GE2>/YR: CPT | Mod: CPTII,S$GLB,, | Performed by: INTERNAL MEDICINE

## 2021-01-01 PROCEDURE — 3074F SYST BP LT 130 MM HG: CPT | Mod: CPTII,S$GLB,, | Performed by: NURSE PRACTITIONER

## 2021-01-01 PROCEDURE — 96361 HYDRATE IV INFUSION ADD-ON: CPT

## 2021-01-01 PROCEDURE — G0179 PR HOME HEALTH MD RECERTIFICATION: ICD-10-PCS | Mod: ,,, | Performed by: INTERNAL MEDICINE

## 2021-01-01 PROCEDURE — 1126F PR PAIN SEVERITY QUANTIFIED, NO PAIN PRESENT: ICD-10-PCS | Mod: S$GLB,,, | Performed by: INTERNAL MEDICINE

## 2021-01-01 PROCEDURE — 94640 AIRWAY INHALATION TREATMENT: CPT

## 2021-01-01 PROCEDURE — 3288F PR FALLS RISK ASSESSMENT DOCUMENTED: ICD-10-PCS | Mod: CPTII,S$GLB,, | Performed by: SURGERY

## 2021-01-01 PROCEDURE — 99220 PR INITIAL OBSERVATION CARE,LEVL III: ICD-10-PCS | Mod: ,,, | Performed by: PHYSICIAN ASSISTANT

## 2021-01-01 PROCEDURE — 1111F DSCHRG MED/CURRENT MED MERGE: CPT | Mod: CPTII,,, | Performed by: PHYSICIAN ASSISTANT

## 2021-01-01 PROCEDURE — 99285 EMERGENCY DEPT VISIT HI MDM: CPT | Mod: HCNC,,, | Performed by: EMERGENCY MEDICINE

## 2021-01-01 PROCEDURE — 89051 BODY FLUID CELL COUNT: CPT | Performed by: GENERAL PRACTICE

## 2021-01-01 PROCEDURE — 99233 SBSQ HOSP IP/OBS HIGH 50: CPT | Mod: GC,,, | Performed by: INTERNAL MEDICINE

## 2021-01-01 PROCEDURE — 63600175 PHARM REV CODE 636 W HCPCS: Performed by: EMERGENCY MEDICINE

## 2021-01-01 PROCEDURE — 99497 PR ADVNCD CARE PLAN 30 MIN: ICD-10-PCS | Mod: ,,, | Performed by: INTERNAL MEDICINE

## 2021-01-01 PROCEDURE — 99999 PR PBB SHADOW E&M-EST. PATIENT-LVL IV: CPT | Mod: PBBFAC,,, | Performed by: HOSPITALIST

## 2021-01-01 PROCEDURE — 3078F DIAST BP <80 MM HG: CPT | Mod: CPTII,S$GLB,, | Performed by: HOSPITALIST

## 2021-01-01 PROCEDURE — 99214 OFFICE O/P EST MOD 30 MIN: CPT | Mod: S$GLB,,, | Performed by: INTERNAL MEDICINE

## 2021-01-01 PROCEDURE — 3078F PR MOST RECENT DIASTOLIC BLOOD PRESSURE < 80 MM HG: ICD-10-PCS | Mod: CPTII,S$GLB,, | Performed by: SURGERY

## 2021-01-01 PROCEDURE — 94799 UNLISTED PULMONARY SVC/PX: CPT

## 2021-01-01 PROCEDURE — 83615 LACTATE (LD) (LDH) ENZYME: CPT | Mod: HCNC

## 2021-01-01 PROCEDURE — 87186 SC STD MICRODIL/AGAR DIL: CPT | Mod: HCNC

## 2021-01-01 PROCEDURE — 82652 VIT D 1 25-DIHYDROXY: CPT | Performed by: INTERNAL MEDICINE

## 2021-01-01 PROCEDURE — 3074F SYST BP LT 130 MM HG: CPT | Mod: HCNC,CPTII,S$GLB, | Performed by: INTERNAL MEDICINE

## 2021-01-01 PROCEDURE — 82306 VITAMIN D 25 HYDROXY: CPT | Performed by: PHYSICIAN ASSISTANT

## 2021-01-01 PROCEDURE — 99497 ADVNCD CARE PLAN 30 MIN: CPT | Mod: S$GLB,,, | Performed by: NURSE PRACTITIONER

## 2021-01-01 PROCEDURE — 99238 HOSP IP/OBS DSCHRG MGMT 30/<: CPT | Mod: HCNC,,, | Performed by: INTERNAL MEDICINE

## 2021-01-01 PROCEDURE — 27100171 HC OXYGEN HIGH FLOW UP TO 24 HOURS

## 2021-01-01 PROCEDURE — 63600175 PHARM REV CODE 636 W HCPCS: Performed by: PSYCHIATRY & NEUROLOGY

## 2021-01-01 PROCEDURE — 3288F FALL RISK ASSESSMENT DOCD: CPT | Mod: HCNC,CPTII,S$GLB, | Performed by: INTERNAL MEDICINE

## 2021-01-01 PROCEDURE — 85014 HEMATOCRIT: CPT | Performed by: PSYCHIATRY & NEUROLOGY

## 2021-01-01 PROCEDURE — 1101F PT FALLS ASSESS-DOCD LE1/YR: CPT | Mod: CPTII,S$GLB,, | Performed by: INTERNAL MEDICINE

## 2021-01-01 PROCEDURE — U0002 COVID-19 LAB TEST NON-CDC: HCPCS | Performed by: EMERGENCY MEDICINE

## 2021-01-01 PROCEDURE — 86900 BLOOD TYPING SEROLOGIC ABO: CPT | Mod: HCNC

## 2021-01-01 PROCEDURE — 1111F PR DISCHARGE MEDS RECONCILED W/ CURRENT OUTPATIENT MED LIST: ICD-10-PCS | Mod: CPTII,S$GLB,, | Performed by: NURSE PRACTITIONER

## 2021-01-01 PROCEDURE — 80053 COMPREHEN METABOLIC PANEL: CPT | Performed by: PHYSICIAN ASSISTANT

## 2021-01-01 PROCEDURE — 99233 PR SUBSEQUENT HOSPITAL CARE,LEVL III: ICD-10-PCS | Mod: HCNC,,, | Performed by: INTERNAL MEDICINE

## 2021-01-01 PROCEDURE — 99223 1ST HOSP IP/OBS HIGH 75: CPT | Mod: HCNC,,, | Performed by: HOSPITALIST

## 2021-01-01 PROCEDURE — 85025 COMPLETE CBC W/AUTO DIFF WBC: CPT | Performed by: EMERGENCY MEDICINE

## 2021-01-01 PROCEDURE — 97162 PT EVAL MOD COMPLEX 30 MIN: CPT

## 2021-01-01 PROCEDURE — 80047 BASIC METABLC PNL IONIZED CA: CPT

## 2021-01-01 PROCEDURE — 99215 PR OFFICE/OUTPT VISIT, EST, LEVL V, 40-54 MIN: ICD-10-PCS | Mod: S$GLB,,, | Performed by: INTERNAL MEDICINE

## 2021-01-01 PROCEDURE — 27201040 HC RC 50 FILTER: Performed by: STUDENT IN AN ORGANIZED HEALTH CARE EDUCATION/TRAINING PROGRAM

## 2021-01-01 PROCEDURE — 1100F PR PT FALLS ASSESS DOC 2+ FALLS/FALL W/INJURY/YR: ICD-10-PCS | Mod: HCNC,CPTII,S$GLB, | Performed by: INTERNAL MEDICINE

## 2021-01-01 PROCEDURE — D9220A PRA ANESTHESIA: ICD-10-PCS | Mod: ,,, | Performed by: ANESTHESIOLOGY

## 2021-01-01 PROCEDURE — 78815 NM PET CT ROUTINE: ICD-10-PCS | Mod: 26,HCNC,PS, | Performed by: RADIOLOGY

## 2021-01-01 PROCEDURE — 99999 PR PBB SHADOW E&M-EST. PATIENT-LVL III: CPT | Mod: PBBFAC,,, | Performed by: INTERNAL MEDICINE

## 2021-01-01 PROCEDURE — 1159F PR MEDICATION LIST DOCUMENTED IN MEDICAL RECORD: ICD-10-PCS | Mod: S$GLB,,, | Performed by: HOSPITALIST

## 2021-01-01 PROCEDURE — 96376 TX/PRO/DX INJ SAME DRUG ADON: CPT

## 2021-01-01 PROCEDURE — 36000709 HC OR TIME LEV III EA ADD 15 MIN: Performed by: SURGERY

## 2021-01-01 PROCEDURE — 99239 HOSP IP/OBS DSCHRG MGMT >30: CPT | Mod: ,,, | Performed by: HOSPITALIST

## 2021-01-01 PROCEDURE — 80069 RENAL FUNCTION PANEL: CPT | Performed by: HOSPITALIST

## 2021-01-01 PROCEDURE — 3075F PR MOST RECENT SYSTOLIC BLOOD PRESS GE 130-139MM HG: ICD-10-PCS | Mod: CPTII,S$GLB,, | Performed by: HOSPITALIST

## 2021-01-01 PROCEDURE — 31720 CLEARANCE OF AIRWAYS: CPT

## 2021-01-01 PROCEDURE — 85027 COMPLETE CBC AUTOMATED: CPT | Performed by: INTERNAL MEDICINE

## 2021-01-01 PROCEDURE — 3074F SYST BP LT 130 MM HG: CPT | Mod: CPTII,S$GLB,, | Performed by: HOSPITALIST

## 2021-01-01 PROCEDURE — 3288F FALL RISK ASSESSMENT DOCD: CPT | Mod: CPTII,S$GLB,, | Performed by: SURGERY

## 2021-01-01 PROCEDURE — 99239 PR HOSPITAL DISCHARGE DAY,>30 MIN: ICD-10-PCS | Mod: GC,,, | Performed by: STUDENT IN AN ORGANIZED HEALTH CARE EDUCATION/TRAINING PROGRAM

## 2021-01-01 PROCEDURE — 83880 ASSAY OF NATRIURETIC PEPTIDE: CPT | Performed by: PHYSICIAN ASSISTANT

## 2021-01-01 PROCEDURE — 3074F PR MOST RECENT SYSTOLIC BLOOD PRESSURE < 130 MM HG: ICD-10-PCS | Mod: CPTII,S$GLB,, | Performed by: HOSPITALIST

## 2021-01-01 PROCEDURE — 99285 PR EMERGENCY DEPT VISIT,LEVEL V: ICD-10-PCS | Mod: HCNC,,, | Performed by: EMERGENCY MEDICINE

## 2021-01-01 PROCEDURE — 3288F PR FALLS RISK ASSESSMENT DOCUMENTED: ICD-10-PCS | Mod: CPTII,S$GLB,, | Performed by: PSYCHIATRY & NEUROLOGY

## 2021-01-01 PROCEDURE — 99215 OFFICE O/P EST HI 40 MIN: CPT | Mod: ,,, | Performed by: HOSPITALIST

## 2021-01-01 PROCEDURE — 1101F PT FALLS ASSESS-DOCD LE1/YR: CPT | Mod: CPTII,S$GLB,, | Performed by: SURGERY

## 2021-01-01 PROCEDURE — 3079F PR MOST RECENT DIASTOLIC BLOOD PRESSURE 80-89 MM HG: ICD-10-PCS | Mod: CPTII,S$GLB,, | Performed by: HOSPITALIST

## 2021-01-01 PROCEDURE — 84133 ASSAY OF URINE POTASSIUM: CPT | Mod: HCNC

## 2021-01-01 PROCEDURE — 94760 N-INVAS EAR/PLS OXIMETRY 1: CPT

## 2021-01-01 PROCEDURE — 83540 ASSAY OF IRON: CPT | Mod: HCNC

## 2021-01-01 PROCEDURE — 99222 1ST HOSP IP/OBS MODERATE 55: CPT | Mod: ,,, | Performed by: PSYCHIATRY & NEUROLOGY

## 2021-01-01 PROCEDURE — 1159F MED LIST DOCD IN RCRD: CPT | Mod: S$GLB,,, | Performed by: HOSPITALIST

## 2021-01-01 PROCEDURE — 95714 VEEG EA 12-26 HR UNMNTR: CPT

## 2021-01-01 PROCEDURE — 1159F MED LIST DOCD IN RCRD: CPT | Mod: HCNC,95,, | Performed by: INTERNAL MEDICINE

## 2021-01-01 PROCEDURE — 1160F RVW MEDS BY RX/DR IN RCRD: CPT | Mod: CPTII,95,, | Performed by: PSYCHOLOGIST

## 2021-01-01 PROCEDURE — 3078F DIAST BP <80 MM HG: CPT | Mod: CPTII,S$GLB,, | Performed by: NURSE PRACTITIONER

## 2021-01-01 PROCEDURE — 25000242 PHARM REV CODE 250 ALT 637 W/ HCPCS: Performed by: NURSE PRACTITIONER

## 2021-01-01 PROCEDURE — 99217 PR OBSERVATION CARE DISCHARGE: ICD-10-PCS | Mod: ,,, | Performed by: PHYSICIAN ASSISTANT

## 2021-01-01 PROCEDURE — 3288F FALL RISK ASSESSMENT DOCD: CPT | Mod: CPTII,S$GLB,, | Performed by: INTERNAL MEDICINE

## 2021-01-01 PROCEDURE — 3078F DIAST BP <80 MM HG: CPT | Mod: CPTII,S$GLB,, | Performed by: INTERNAL MEDICINE

## 2021-01-01 PROCEDURE — G0179 MD RECERTIFICATION HHA PT: HCPCS | Mod: ,,, | Performed by: INTERNAL MEDICINE

## 2021-01-01 PROCEDURE — 1159F PR MEDICATION LIST DOCUMENTED IN MEDICAL RECORD: ICD-10-PCS | Mod: 95,,, | Performed by: INTERNAL MEDICINE

## 2021-01-01 PROCEDURE — 25500020 PHARM REV CODE 255: Performed by: HOSPITALIST

## 2021-01-01 PROCEDURE — 99999 PR PBB SHADOW E&M-EST. PATIENT-LVL IV: ICD-10-PCS | Mod: PBBFAC,,, | Performed by: PSYCHIATRY & NEUROLOGY

## 2021-01-01 PROCEDURE — P9040 RBC LEUKOREDUCED IRRADIATED: HCPCS | Performed by: PHYSICIAN ASSISTANT

## 2021-01-01 PROCEDURE — 3078F DIAST BP <80 MM HG: CPT | Mod: HCNC,CPTII,S$GLB, | Performed by: INTERNAL MEDICINE

## 2021-01-01 PROCEDURE — 99238 PR HOSPITAL DISCHARGE DAY,<30 MIN: ICD-10-PCS | Mod: HCNC,,, | Performed by: INTERNAL MEDICINE

## 2021-01-01 PROCEDURE — 99999 PR PBB SHADOW E&M-EST. PATIENT-LVL IV: ICD-10-PCS | Mod: PBBFAC,HCNC,, | Performed by: INTERNAL MEDICINE

## 2021-01-01 PROCEDURE — 25500020 PHARM REV CODE 255: Performed by: PSYCHIATRY & NEUROLOGY

## 2021-01-01 PROCEDURE — G0179 PR HOME HEALTH MD RECERTIFICATION: ICD-10-PCS | Mod: ,,, | Performed by: HOSPITALIST

## 2021-01-01 PROCEDURE — 36415 COLL VENOUS BLD VENIPUNCTURE: CPT | Mod: HCNC,PO

## 2021-01-01 PROCEDURE — 99223 1ST HOSP IP/OBS HIGH 75: CPT | Mod: AI,HCNC,GC, | Performed by: INTERNAL MEDICINE

## 2021-01-01 PROCEDURE — 86803 HEPATITIS C AB TEST: CPT | Performed by: EMERGENCY MEDICINE

## 2021-01-01 PROCEDURE — 97165 OT EVAL LOW COMPLEX 30 MIN: CPT

## 2021-01-01 PROCEDURE — 99499 NO LOS: ICD-10-PCS | Mod: ,,, | Performed by: STUDENT IN AN ORGANIZED HEALTH CARE EDUCATION/TRAINING PROGRAM

## 2021-01-01 PROCEDURE — 81001 URINALYSIS AUTO W/SCOPE: CPT | Performed by: EMERGENCY MEDICINE

## 2021-01-01 PROCEDURE — 85018 HEMOGLOBIN: CPT | Performed by: PHYSICIAN ASSISTANT

## 2021-01-01 PROCEDURE — 1159F MED LIST DOCD IN RCRD: CPT | Mod: HCNC,S$GLB,, | Performed by: INTERNAL MEDICINE

## 2021-01-01 PROCEDURE — 96374 THER/PROPH/DIAG INJ IV PUSH: CPT | Mod: HCNC

## 2021-01-01 PROCEDURE — 99499 RISK ADDL DX/OHS AUDIT: ICD-10-PCS | Mod: S$GLB,,, | Performed by: HOSPITALIST

## 2021-01-01 PROCEDURE — 99215 PR OFFICE/OUTPT VISIT, EST, LEVL V, 40-54 MIN: ICD-10-PCS | Mod: S$GLB,,, | Performed by: HOSPITALIST

## 2021-01-01 PROCEDURE — 71000015 HC POSTOP RECOV 1ST HR: Performed by: SURGERY

## 2021-01-01 PROCEDURE — 95819 PR EEG,W/AWAKE & ASLEEP RECORD: ICD-10-PCS | Mod: 26,,, | Performed by: PSYCHIATRY & NEUROLOGY

## 2021-01-01 PROCEDURE — 99238 PR HOSPITAL DISCHARGE DAY,<30 MIN: ICD-10-PCS | Mod: GV,,, | Performed by: PHYSICIAN ASSISTANT

## 2021-01-01 PROCEDURE — 95819 EEG AWAKE AND ASLEEP: CPT

## 2021-01-01 PROCEDURE — 99999 PR PBB SHADOW E&M-EST. PATIENT-LVL V: CPT | Mod: PBBFAC,,, | Performed by: HOSPITALIST

## 2021-01-01 PROCEDURE — 86255 FLUORESCENT ANTIBODY SCREEN: CPT | Mod: 59

## 2021-01-01 PROCEDURE — 36415 COLL VENOUS BLD VENIPUNCTURE: CPT | Performed by: STUDENT IN AN ORGANIZED HEALTH CARE EDUCATION/TRAINING PROGRAM

## 2021-01-01 PROCEDURE — 99214 OFFICE O/P EST MOD 30 MIN: CPT | Mod: S$GLB,,, | Performed by: PSYCHIATRY & NEUROLOGY

## 2021-01-01 PROCEDURE — 82330 ASSAY OF CALCIUM: CPT | Performed by: HOSPITALIST

## 2021-01-01 PROCEDURE — 99214 OFFICE O/P EST MOD 30 MIN: CPT | Mod: HCNC,S$GLB,, | Performed by: INTERNAL MEDICINE

## 2021-01-01 PROCEDURE — 36600 WITHDRAWAL OF ARTERIAL BLOOD: CPT | Mod: HCNC

## 2021-01-01 PROCEDURE — 83880 ASSAY OF NATRIURETIC PEPTIDE: CPT | Mod: HCNC

## 2021-01-01 PROCEDURE — 95720 PR EEG, W/VIDEO, CONT RECORD, I&R, >12<26 HRS: ICD-10-PCS | Mod: ,,, | Performed by: PSYCHIATRY & NEUROLOGY

## 2021-01-01 PROCEDURE — 99442 PR PHYSICIAN TELEPHONE EVALUATION 11-20 MIN: CPT | Mod: 95,,, | Performed by: INTERNAL MEDICINE

## 2021-01-01 PROCEDURE — 99291 CRITICAL CARE FIRST HOUR: CPT | Mod: ,,, | Performed by: PSYCHIATRY & NEUROLOGY

## 2021-01-01 PROCEDURE — 63600175 PHARM REV CODE 636 W HCPCS: Performed by: INTERNAL MEDICINE

## 2021-01-01 PROCEDURE — 99233 PR SUBSEQUENT HOSPITAL CARE,LEVL III: ICD-10-PCS | Mod: GC,,, | Performed by: STUDENT IN AN ORGANIZED HEALTH CARE EDUCATION/TRAINING PROGRAM

## 2021-01-01 PROCEDURE — 99999 PR PBB SHADOW E&M-EST. PATIENT-LVL IV: CPT | Mod: PBBFAC,,, | Performed by: PSYCHIATRY & NEUROLOGY

## 2021-01-01 PROCEDURE — 36430 TRANSFUSION BLD/BLD COMPNT: CPT

## 2021-01-01 PROCEDURE — 99232 PR SUBSEQUENT HOSPITAL CARE,LEVL II: ICD-10-PCS | Mod: GW,,, | Performed by: INTERNAL MEDICINE

## 2021-01-01 PROCEDURE — 99499 UNLISTED E&M SERVICE: CPT | Mod: ,,, | Performed by: STUDENT IN AN ORGANIZED HEALTH CARE EDUCATION/TRAINING PROGRAM

## 2021-01-01 PROCEDURE — 1159F PR MEDICATION LIST DOCUMENTED IN MEDICAL RECORD: ICD-10-PCS | Mod: HCNC,S$GLB,, | Performed by: INTERNAL MEDICINE

## 2021-01-01 PROCEDURE — 99225 PR SUBSEQUENT OBSERVATION CARE,LEVEL II: ICD-10-PCS | Mod: ,,, | Performed by: PHYSICIAN ASSISTANT

## 2021-01-01 PROCEDURE — 25000242 PHARM REV CODE 250 ALT 637 W/ HCPCS: Performed by: INTERNAL MEDICINE

## 2021-01-01 PROCEDURE — 3077F SYST BP >= 140 MM HG: CPT | Mod: CPTII,S$GLB,, | Performed by: INTERNAL MEDICINE

## 2021-01-01 PROCEDURE — 71000033 HC RECOVERY, INTIAL HOUR: Performed by: SURGERY

## 2021-01-01 PROCEDURE — 84550 ASSAY OF BLOOD/URIC ACID: CPT | Mod: HCNC

## 2021-01-01 PROCEDURE — 99233 SBSQ HOSP IP/OBS HIGH 50: CPT | Mod: GC,,, | Performed by: STUDENT IN AN ORGANIZED HEALTH CARE EDUCATION/TRAINING PROGRAM

## 2021-01-01 PROCEDURE — 99999 PR PBB SHADOW E&M-EST. PATIENT-LVL IV: ICD-10-PCS | Mod: PBBFAC,,, | Performed by: HOSPITALIST

## 2021-01-01 PROCEDURE — 99291 PR CRITICAL CARE, E/M 30-74 MINUTES: ICD-10-PCS | Mod: ,,, | Performed by: EMERGENCY MEDICINE

## 2021-01-01 PROCEDURE — 99284 EMERGENCY DEPT VISIT MOD MDM: CPT | Mod: 25,HCNC

## 2021-01-01 PROCEDURE — 96361 HYDRATE IV INFUSION ADD-ON: CPT | Mod: HCNC

## 2021-01-01 PROCEDURE — 99222 PR INITIAL HOSPITAL CARE,LEVL II: ICD-10-PCS | Mod: ,,, | Performed by: PSYCHIATRY & NEUROLOGY

## 2021-01-01 PROCEDURE — 3074F PR MOST RECENT SYSTOLIC BLOOD PRESSURE < 130 MM HG: ICD-10-PCS | Mod: HCNC,CPTII,S$GLB, | Performed by: INTERNAL MEDICINE

## 2021-01-01 PROCEDURE — 82306 VITAMIN D 25 HYDROXY: CPT | Mod: HCNC

## 2021-01-01 PROCEDURE — 87205 SMEAR GRAM STAIN: CPT | Performed by: NURSE PRACTITIONER

## 2021-01-01 PROCEDURE — 99234 PR OBSERV/HOSP SAME DATE,LEVL III: ICD-10-PCS | Mod: ,,, | Performed by: INTERNAL MEDICINE

## 2021-01-01 PROCEDURE — 99214 PR OFFICE/OUTPT VISIT, EST, LEVL IV, 30-39 MIN: ICD-10-PCS | Mod: S$GLB,,, | Performed by: HOSPITALIST

## 2021-01-01 PROCEDURE — 99284 EMERGENCY DEPT VISIT MOD MDM: CPT | Mod: ,,, | Performed by: INTERNAL MEDICINE

## 2021-01-01 PROCEDURE — 92610 EVALUATE SWALLOWING FUNCTION: CPT

## 2021-01-01 PROCEDURE — 1159F MED LIST DOCD IN RCRD: CPT | Mod: S$GLB,,, | Performed by: SURGERY

## 2021-01-01 PROCEDURE — 1101F PR PT FALLS ASSESS DOC 0-1 FALLS W/OUT INJ PAST YR: ICD-10-PCS | Mod: CPTII,S$GLB,, | Performed by: PSYCHIATRY & NEUROLOGY

## 2021-01-01 PROCEDURE — 1159F PR MEDICATION LIST DOCUMENTED IN MEDICAL RECORD: ICD-10-PCS | Mod: CPTII,S$GLB,, | Performed by: NURSE PRACTITIONER

## 2021-01-01 PROCEDURE — 99499 UNLISTED E&M SERVICE: CPT | Mod: ,,, | Performed by: EMERGENCY MEDICINE

## 2021-01-01 PROCEDURE — 97165 OT EVAL LOW COMPLEX 30 MIN: CPT | Mod: HCNC

## 2021-01-01 PROCEDURE — 93010 EKG 12-LEAD: ICD-10-PCS | Mod: HCNC,,, | Performed by: INTERNAL MEDICINE

## 2021-01-01 PROCEDURE — 80069 RENAL FUNCTION PANEL: CPT | Performed by: GENERAL PRACTICE

## 2021-01-01 PROCEDURE — 49324 LAP INSERT TUNNEL IP CATH: CPT | Mod: ,,, | Performed by: SURGERY

## 2021-01-01 PROCEDURE — G0179 MD RECERTIFICATION HHA PT: HCPCS | Mod: ,,, | Performed by: HOSPITALIST

## 2021-01-01 PROCEDURE — 85045 AUTOMATED RETICULOCYTE COUNT: CPT | Performed by: INTERNAL MEDICINE

## 2021-01-01 PROCEDURE — 87070 CULTURE OTHR SPECIMN AEROBIC: CPT | Performed by: NURSE PRACTITIONER

## 2021-01-01 PROCEDURE — 99223 1ST HOSP IP/OBS HIGH 75: CPT | Mod: AI,GC,, | Performed by: STUDENT IN AN ORGANIZED HEALTH CARE EDUCATION/TRAINING PROGRAM

## 2021-01-01 PROCEDURE — 78815 PET IMAGE W/CT SKULL-THIGH: CPT | Mod: TC,HCNC,PS

## 2021-01-01 PROCEDURE — 84425 ASSAY OF VITAMIN B-1: CPT | Performed by: PHYSICIAN ASSISTANT

## 2021-01-01 PROCEDURE — 86900 BLOOD TYPING SEROLOGIC ABO: CPT | Performed by: STUDENT IN AN ORGANIZED HEALTH CARE EDUCATION/TRAINING PROGRAM

## 2021-01-01 PROCEDURE — 3074F PR MOST RECENT SYSTOLIC BLOOD PRESSURE < 130 MM HG: ICD-10-PCS | Mod: CPTII,S$GLB,, | Performed by: NURSE PRACTITIONER

## 2021-01-01 PROCEDURE — 63600175 PHARM REV CODE 636 W HCPCS: Performed by: SURGERY

## 2021-01-01 PROCEDURE — 99223 PR INITIAL HOSPITAL CARE,LEVL III: ICD-10-PCS | Mod: HCNC,,, | Performed by: HOSPITALIST

## 2021-01-01 PROCEDURE — 99999 PR PBB SHADOW E&M-EST. PATIENT-LVL V: ICD-10-PCS | Mod: PBBFAC,,, | Performed by: SURGERY

## 2021-01-01 PROCEDURE — 93970 EXTREMITY STUDY: CPT | Mod: TC

## 2021-01-01 PROCEDURE — 99284 PR EMERGENCY DEPT VISIT,LEVEL IV: ICD-10-PCS | Mod: ,,, | Performed by: EMERGENCY MEDICINE

## 2021-01-01 PROCEDURE — 84443 ASSAY THYROID STIM HORMONE: CPT | Performed by: EMERGENCY MEDICINE

## 2021-01-01 PROCEDURE — 99999 PR PBB SHADOW E&M-EST. PATIENT-LVL V: ICD-10-PCS | Mod: PBBFAC,,, | Performed by: INTERNAL MEDICINE

## 2021-01-01 PROCEDURE — 1159F MED LIST DOCD IN RCRD: CPT | Mod: 95,,, | Performed by: INTERNAL MEDICINE

## 2021-01-01 PROCEDURE — 82397 CHEMILUMINESCENT ASSAY: CPT | Performed by: INTERNAL MEDICINE

## 2021-01-01 PROCEDURE — 3078F DIAST BP <80 MM HG: CPT | Mod: CPTII,S$GLB,, | Performed by: SURGERY

## 2021-01-01 PROCEDURE — 81000 URINALYSIS NONAUTO W/SCOPE: CPT | Performed by: EMERGENCY MEDICINE

## 2021-01-01 PROCEDURE — 85610 PROTHROMBIN TIME: CPT | Performed by: STUDENT IN AN ORGANIZED HEALTH CARE EDUCATION/TRAINING PROGRAM

## 2021-01-01 PROCEDURE — 99999 PR PBB SHADOW E&M-EST. PATIENT-LVL V: CPT | Mod: PBBFAC,,, | Performed by: SURGERY

## 2021-01-01 PROCEDURE — 1159F MED LIST DOCD IN RCRD: CPT | Mod: 95,,, | Performed by: HOSPITALIST

## 2021-01-01 PROCEDURE — 93970 EXTREMITY STUDY: CPT | Mod: 26,,, | Performed by: RADIOLOGY

## 2021-01-01 PROCEDURE — 99226 PR SUBSEQUENT OBSERVATION CARE,LEVEL III: CPT | Mod: ,,, | Performed by: PHYSICIAN ASSISTANT

## 2021-01-01 PROCEDURE — 84300 ASSAY OF URINE SODIUM: CPT | Performed by: STUDENT IN AN ORGANIZED HEALTH CARE EDUCATION/TRAINING PROGRAM

## 2021-01-01 PROCEDURE — 1126F PR PAIN SEVERITY QUANTIFIED, NO PAIN PRESENT: ICD-10-PCS | Mod: S$GLB,,, | Performed by: PSYCHIATRY & NEUROLOGY

## 2021-01-01 PROCEDURE — 1126F AMNT PAIN NOTED NONE PRSNT: CPT | Mod: S$GLB,,, | Performed by: PSYCHIATRY & NEUROLOGY

## 2021-01-01 PROCEDURE — 87086 URINE CULTURE/COLONY COUNT: CPT | Performed by: EMERGENCY MEDICINE

## 2021-01-01 PROCEDURE — 97166 OT EVAL MOD COMPLEX 45 MIN: CPT

## 2021-01-01 PROCEDURE — 86900 BLOOD TYPING SEROLOGIC ABO: CPT | Performed by: INTERNAL MEDICINE

## 2021-01-01 PROCEDURE — 99442 PR PHYSICIAN TELEPHONE EVALUATION 11-20 MIN: ICD-10-PCS | Mod: 95,,, | Performed by: INTERNAL MEDICINE

## 2021-01-01 PROCEDURE — 1159F PR MEDICATION LIST DOCUMENTED IN MEDICAL RECORD: ICD-10-PCS | Mod: CPTII,95,, | Performed by: PSYCHOLOGIST

## 2021-01-01 PROCEDURE — 99497 ADVNCD CARE PLAN 30 MIN: CPT | Mod: 25,,, | Performed by: NURSE PRACTITIONER

## 2021-01-01 PROCEDURE — 1159F PR MEDICATION LIST DOCUMENTED IN MEDICAL RECORD: ICD-10-PCS | Mod: S$GLB,,, | Performed by: SURGERY

## 2021-01-01 PROCEDURE — 81003 URINALYSIS AUTO W/O SCOPE: CPT | Mod: HCNC

## 2021-01-01 PROCEDURE — 25000003 PHARM REV CODE 250: Mod: HCNC | Performed by: EMERGENCY MEDICINE

## 2021-01-01 PROCEDURE — 86920 COMPATIBILITY TEST SPIN: CPT | Performed by: PHYSICIAN ASSISTANT

## 2021-01-01 PROCEDURE — 82550 ASSAY OF CK (CPK): CPT | Mod: HCNC

## 2021-01-01 PROCEDURE — 87075 CULTR BACTERIA EXCEPT BLOOD: CPT | Performed by: EMERGENCY MEDICINE

## 2021-01-01 PROCEDURE — 1159F MED LIST DOCD IN RCRD: CPT | Mod: S$GLB,,, | Performed by: PSYCHIATRY & NEUROLOGY

## 2021-01-01 PROCEDURE — 1126F AMNT PAIN NOTED NONE PRSNT: CPT | Mod: S$GLB,,, | Performed by: SURGERY

## 2021-01-01 PROCEDURE — 25000003 PHARM REV CODE 250: Performed by: SURGERY

## 2021-01-01 PROCEDURE — 94761 N-INVAS EAR/PLS OXIMETRY MLT: CPT | Mod: HCNC

## 2021-01-01 PROCEDURE — 1126F PR PAIN SEVERITY QUANTIFIED, NO PAIN PRESENT: ICD-10-PCS | Mod: HCNC,S$GLB,, | Performed by: INTERNAL MEDICINE

## 2021-01-01 PROCEDURE — 80061 LIPID PANEL: CPT | Performed by: PHYSICIAN ASSISTANT

## 2021-01-01 PROCEDURE — 99499 UNLISTED E&M SERVICE: CPT | Mod: 95,,, | Performed by: INTERNAL MEDICINE

## 2021-01-01 PROCEDURE — 99284 EMERGENCY DEPT VISIT MOD MDM: CPT | Mod: HCNC,GC,, | Performed by: EMERGENCY MEDICINE

## 2021-01-01 PROCEDURE — 99284 EMERGENCY DEPT VISIT MOD MDM: CPT | Mod: 25

## 2021-01-01 PROCEDURE — 1160F PR REVIEW ALL MEDS BY PRESCRIBER/CLIN PHARMACIST DOCUMENTED: ICD-10-PCS | Mod: CPTII,95,, | Performed by: PSYCHOLOGIST

## 2021-01-01 PROCEDURE — 1101F PR PT FALLS ASSESS DOC 0-1 FALLS W/OUT INJ PAST YR: ICD-10-PCS | Mod: CPTII,S$GLB,, | Performed by: SURGERY

## 2021-01-01 PROCEDURE — 99214 OFFICE O/P EST MOD 30 MIN: CPT | Mod: HCNC,95,, | Performed by: INTERNAL MEDICINE

## 2021-01-01 PROCEDURE — 1126F AMNT PAIN NOTED NONE PRSNT: CPT | Mod: HCNC,S$GLB,, | Performed by: INTERNAL MEDICINE

## 2021-01-01 PROCEDURE — 87070 CULTURE OTHR SPECIMN AEROBIC: CPT | Performed by: GENERAL PRACTICE

## 2021-01-01 PROCEDURE — 99284 PR EMERGENCY DEPT VISIT,LEVEL IV: ICD-10-PCS | Mod: HCNC,GC,, | Performed by: EMERGENCY MEDICINE

## 2021-01-01 PROCEDURE — 87040 BLOOD CULTURE FOR BACTERIA: CPT | Mod: 59

## 2021-01-01 PROCEDURE — U0002 COVID-19 LAB TEST NON-CDC: HCPCS | Mod: HCNC | Performed by: EMERGENCY MEDICINE

## 2021-01-01 PROCEDURE — 99499 RISK ADDL DX/OHS AUDIT: ICD-10-PCS | Mod: 95,,, | Performed by: INTERNAL MEDICINE

## 2021-01-01 PROCEDURE — 82728 ASSAY OF FERRITIN: CPT | Mod: HCNC

## 2021-01-01 PROCEDURE — 37000009 HC ANESTHESIA EA ADD 15 MINS: Performed by: SURGERY

## 2021-01-01 PROCEDURE — 1100F PR PT FALLS ASSESS DOC 2+ FALLS/FALL W/INJURY/YR: ICD-10-PCS | Mod: CPTII,S$GLB,, | Performed by: INTERNAL MEDICINE

## 2021-01-01 PROCEDURE — 49324 PR LAP INSERTION TUNNELED INTRAPERITONEAL CATHETER: ICD-10-PCS | Mod: ,,, | Performed by: SURGERY

## 2021-01-01 PROCEDURE — 82010 KETONE BODYS QUAN: CPT | Mod: HCNC

## 2021-01-01 PROCEDURE — 1159F PR MEDICATION LIST DOCUMENTED IN MEDICAL RECORD: ICD-10-PCS | Mod: 95,,, | Performed by: HOSPITALIST

## 2021-01-01 PROCEDURE — 99234 HOSP IP/OBS SM DT SF/LOW 45: CPT | Mod: ,,, | Performed by: INTERNAL MEDICINE

## 2021-01-01 PROCEDURE — 99284 EMERGENCY DEPT VISIT MOD MDM: CPT | Mod: ,,, | Performed by: EMERGENCY MEDICINE

## 2021-01-01 PROCEDURE — 36415 COLL VENOUS BLD VENIPUNCTURE: CPT | Performed by: GENERAL PRACTICE

## 2021-01-01 PROCEDURE — C1750 CATH, HEMODIALYSIS,LONG-TERM: HCPCS | Performed by: SURGERY

## 2021-01-01 PROCEDURE — 87205 SMEAR GRAM STAIN: CPT | Performed by: PHYSICIAN ASSISTANT

## 2021-01-01 PROCEDURE — 95700 EEG CONT REC W/VID EEG TECH: CPT

## 2021-01-01 PROCEDURE — 99223 PR INITIAL HOSPITAL CARE,LEVL III: ICD-10-PCS | Mod: AI,HCNC,GC, | Performed by: INTERNAL MEDICINE

## 2021-01-01 PROCEDURE — 83605 ASSAY OF LACTIC ACID: CPT | Mod: HCNC

## 2021-01-01 PROCEDURE — 83880 ASSAY OF NATRIURETIC PEPTIDE: CPT | Performed by: EMERGENCY MEDICINE

## 2021-01-01 PROCEDURE — 99999 PR PBB SHADOW E&M-EST. PATIENT-LVL IV: ICD-10-PCS | Mod: PBBFAC,,, | Performed by: INTERNAL MEDICINE

## 2021-01-01 PROCEDURE — 99238 HOSP IP/OBS DSCHRG MGMT 30/<: CPT | Mod: GV,,, | Performed by: PHYSICIAN ASSISTANT

## 2021-01-01 PROCEDURE — 85018 HEMOGLOBIN: CPT | Performed by: PSYCHIATRY & NEUROLOGY

## 2021-01-01 PROCEDURE — 78815 PET IMAGE W/CT SKULL-THIGH: CPT | Mod: 26,HCNC,PS, | Performed by: RADIOLOGY

## 2021-01-01 PROCEDURE — 99225 PR SUBSEQUENT OBSERVATION CARE,LEVEL II: CPT | Mod: ,,, | Performed by: PHYSICIAN ASSISTANT

## 2021-01-01 PROCEDURE — 83010 ASSAY OF HAPTOGLOBIN QUANT: CPT | Performed by: INTERNAL MEDICINE

## 2021-01-01 PROCEDURE — 3077F PR MOST RECENT SYSTOLIC BLOOD PRESSURE >= 140 MM HG: ICD-10-PCS | Mod: CPTII,S$GLB,, | Performed by: SURGERY

## 2021-01-01 PROCEDURE — 1159F PR MEDICATION LIST DOCUMENTED IN MEDICAL RECORD: ICD-10-PCS | Mod: S$GLB,,, | Performed by: PSYCHIATRY & NEUROLOGY

## 2021-01-01 PROCEDURE — 93970 US LOWER EXTREMITY VEINS BILATERAL: ICD-10-PCS | Mod: 26,,, | Performed by: RADIOLOGY

## 2021-01-01 PROCEDURE — 84484 ASSAY OF TROPONIN QUANT: CPT | Mod: 91 | Performed by: EMERGENCY MEDICINE

## 2021-01-01 PROCEDURE — 3078F PR MOST RECENT DIASTOLIC BLOOD PRESSURE < 80 MM HG: ICD-10-PCS | Mod: CPTII,S$GLB,, | Performed by: NURSE PRACTITIONER

## 2021-01-01 PROCEDURE — 3075F SYST BP GE 130 - 139MM HG: CPT | Mod: CPTII,S$GLB,, | Performed by: HOSPITALIST

## 2021-01-01 DEVICE — CATH FLEX-NECK ADULT STANDARD: Type: IMPLANTABLE DEVICE | Site: ABDOMEN | Status: FUNCTIONAL

## 2021-01-01 RX ORDER — ACETAMINOPHEN 650 MG/1
650 SUPPOSITORY RECTAL EVERY 6 HOURS PRN
Status: DISCONTINUED | OUTPATIENT
Start: 2021-01-01 | End: 2021-01-01

## 2021-01-01 RX ORDER — SODIUM CHLORIDE 0.9 % (FLUSH) 0.9 %
10 SYRINGE (ML) INJECTION
Status: DISCONTINUED | OUTPATIENT
Start: 2021-01-01 | End: 2021-01-01

## 2021-01-01 RX ORDER — HYDROMORPHONE HYDROCHLORIDE 1 MG/ML
1 INJECTION, SOLUTION INTRAMUSCULAR; INTRAVENOUS; SUBCUTANEOUS
Status: DISCONTINUED | OUTPATIENT
Start: 2021-01-01 | End: 2021-01-01 | Stop reason: HOSPADM

## 2021-01-01 RX ORDER — IBUPROFEN 200 MG
24 TABLET ORAL
Status: DISCONTINUED | OUTPATIENT
Start: 2021-01-01 | End: 2021-01-01 | Stop reason: HOSPADM

## 2021-01-01 RX ORDER — MORPHINE SULFATE IN 0.9 % NACL 30 MG/30ML
3 PATIENT CONTROLLED ANALGESIA SYRINGE INTRAVENOUS CONTINUOUS
Status: DISCONTINUED | OUTPATIENT
Start: 2021-01-01 | End: 2021-01-01 | Stop reason: HOSPADM

## 2021-01-01 RX ORDER — TALC
6 POWDER (GRAM) TOPICAL NIGHTLY PRN
Status: DISCONTINUED | OUTPATIENT
Start: 2021-01-01 | End: 2021-01-01 | Stop reason: HOSPADM

## 2021-01-01 RX ORDER — TAMSULOSIN HYDROCHLORIDE 0.4 MG/1
0.4 CAPSULE ORAL DAILY
Qty: 30 CAPSULE | Refills: 12 | Status: SHIPPED | OUTPATIENT
Start: 2021-01-01 | End: 2021-01-01

## 2021-01-01 RX ORDER — ACETAMINOPHEN 325 MG/1
650 TABLET ORAL EVERY 6 HOURS PRN
Status: DISCONTINUED | OUTPATIENT
Start: 2021-01-01 | End: 2021-01-01 | Stop reason: HOSPADM

## 2021-01-01 RX ORDER — LACTULOSE 10 G/15ML
200 SOLUTION ORAL; RECTAL DAILY
Status: DISCONTINUED | OUTPATIENT
Start: 2021-01-01 | End: 2021-01-01

## 2021-01-01 RX ORDER — DIPHENHYDRAMINE HYDROCHLORIDE 50 MG/ML
INJECTION INTRAMUSCULAR; INTRAVENOUS CODE/TRAUMA/SEDATION MEDICATION
Status: COMPLETED | OUTPATIENT
Start: 2021-01-01 | End: 2021-01-01

## 2021-01-01 RX ORDER — BISACODYL 5 MG
5 TABLET, DELAYED RELEASE (ENTERIC COATED) ORAL DAILY PRN
Qty: 30 TABLET | Refills: 0 | Status: SHIPPED | OUTPATIENT
Start: 2021-01-01

## 2021-01-01 RX ORDER — LEVOFLOXACIN 250 MG/1
250 TABLET ORAL DAILY
Qty: 5 TABLET | Refills: 0 | Status: SHIPPED | OUTPATIENT
Start: 2021-01-01 | End: 2021-01-01

## 2021-01-01 RX ORDER — SODIUM CHLORIDE 0.9 % (FLUSH) 0.9 %
10 SYRINGE (ML) INJECTION
Status: CANCELLED | OUTPATIENT
Start: 2021-01-01

## 2021-01-01 RX ORDER — POLYETHYLENE GLYCOL 3350 17 G/17G
17 POWDER, FOR SOLUTION ORAL DAILY
Status: DISCONTINUED | OUTPATIENT
Start: 2021-01-01 | End: 2021-01-01

## 2021-01-01 RX ORDER — LORAZEPAM 1 MG/1
1 TABLET ORAL NIGHTLY
Qty: 30 TABLET | Refills: 0 | Status: SHIPPED | OUTPATIENT
Start: 2021-01-01 | End: 2021-01-01

## 2021-01-01 RX ORDER — BISACODYL 10 MG
10 SUPPOSITORY, RECTAL RECTAL DAILY PRN
Status: DISCONTINUED | OUTPATIENT
Start: 2021-01-01 | End: 2021-01-01

## 2021-01-01 RX ORDER — AMITRIPTYLINE HYDROCHLORIDE 50 MG/1
50 TABLET, FILM COATED ORAL NIGHTLY
Status: DISCONTINUED | OUTPATIENT
Start: 2021-01-01 | End: 2021-01-01 | Stop reason: HOSPADM

## 2021-01-01 RX ORDER — METOPROLOL TARTRATE 1 MG/ML
INJECTION, SOLUTION INTRAVENOUS
Status: DISPENSED
Start: 2021-01-01 | End: 2021-01-01

## 2021-01-01 RX ORDER — SODIUM BICARBONATE 650 MG/1
1300 TABLET ORAL 3 TIMES DAILY
Status: DISCONTINUED | OUTPATIENT
Start: 2021-01-01 | End: 2021-01-01

## 2021-01-01 RX ORDER — ERGOCALCIFEROL (VITAMIN D2) 200 MCG/ML
50000 DROPS ORAL
Status: DISCONTINUED | OUTPATIENT
Start: 2021-01-01 | End: 2021-01-01

## 2021-01-01 RX ORDER — POLYETHYLENE GLYCOL 3350 17 G/17G
17 POWDER, FOR SOLUTION ORAL 2 TIMES DAILY
Status: DISCONTINUED | OUTPATIENT
Start: 2021-01-01 | End: 2021-01-01

## 2021-01-01 RX ORDER — HEPARIN 100 UNIT/ML
500 SYRINGE INTRAVENOUS
Status: DISCONTINUED | OUTPATIENT
Start: 2021-01-01 | End: 2021-01-01 | Stop reason: HOSPADM

## 2021-01-01 RX ORDER — LACTULOSE 10 G/15ML
30 SOLUTION ORAL; RECTAL 3 TIMES DAILY
Qty: 4050 ML | Refills: 0 | Status: SHIPPED | OUTPATIENT
Start: 2021-01-01 | End: 2021-01-01

## 2021-01-01 RX ORDER — POTASSIUM CHLORIDE 750 MG/1
30 CAPSULE, EXTENDED RELEASE ORAL EVERY 6 HOURS
Status: DISCONTINUED | OUTPATIENT
Start: 2021-01-01 | End: 2021-01-01

## 2021-01-01 RX ORDER — HALOPERIDOL 5 MG/ML
1 INJECTION INTRAMUSCULAR EVERY 4 HOURS PRN
Status: DISCONTINUED | OUTPATIENT
Start: 2021-01-01 | End: 2021-01-01 | Stop reason: HOSPADM

## 2021-01-01 RX ORDER — HYDROCODONE BITARTRATE AND ACETAMINOPHEN 500; 5 MG/1; MG/1
TABLET ORAL
Status: DISCONTINUED | OUTPATIENT
Start: 2021-01-01 | End: 2021-01-01 | Stop reason: HOSPADM

## 2021-01-01 RX ORDER — IPRATROPIUM BROMIDE AND ALBUTEROL SULFATE 2.5; .5 MG/3ML; MG/3ML
3 SOLUTION RESPIRATORY (INHALATION) EVERY 4 HOURS PRN
Status: DISCONTINUED | OUTPATIENT
Start: 2021-01-01 | End: 2021-01-01

## 2021-01-01 RX ORDER — POTASSIUM CHLORIDE 750 MG/1
30 CAPSULE, EXTENDED RELEASE ORAL ONCE
Status: COMPLETED | OUTPATIENT
Start: 2021-01-01 | End: 2021-01-01

## 2021-01-01 RX ORDER — LACTULOSE 10 G/15ML
200 SOLUTION ORAL; RECTAL ONCE
Status: COMPLETED | OUTPATIENT
Start: 2021-01-01 | End: 2021-01-01

## 2021-01-01 RX ORDER — CEFEPIME HYDROCHLORIDE 2 G/1
2 INJECTION, POWDER, FOR SOLUTION INTRAVENOUS
Status: DISCONTINUED | OUTPATIENT
Start: 2021-01-01 | End: 2021-01-01

## 2021-01-01 RX ORDER — CALCITONIN SALMON 200 [USP'U]/ML
4 INJECTION, SOLUTION INTRAMUSCULAR; SUBCUTANEOUS ONCE
Status: COMPLETED | OUTPATIENT
Start: 2021-01-01 | End: 2021-01-01

## 2021-01-01 RX ORDER — PSEUDOEPHEDRINE/ACETAMINOPHEN 30MG-500MG
100 TABLET ORAL DAILY
Status: DISCONTINUED | OUTPATIENT
Start: 2021-01-01 | End: 2021-01-01

## 2021-01-01 RX ORDER — SODIUM CHLORIDE 9 MG/ML
INJECTION, SOLUTION INTRAVENOUS CONTINUOUS
Status: DISCONTINUED | OUTPATIENT
Start: 2021-01-01 | End: 2021-01-01

## 2021-01-01 RX ORDER — FUROSEMIDE 40 MG/1
40 TABLET ORAL 2 TIMES DAILY
Status: DISCONTINUED | OUTPATIENT
Start: 2021-01-01 | End: 2021-01-01 | Stop reason: HOSPADM

## 2021-01-01 RX ORDER — SODIUM CHLORIDE 0.9 % (FLUSH) 0.9 %
10 SYRINGE (ML) INJECTION
Status: DISCONTINUED | OUTPATIENT
Start: 2021-01-01 | End: 2021-01-01 | Stop reason: HOSPADM

## 2021-01-01 RX ORDER — ATORVASTATIN CALCIUM 20 MG/1
40 TABLET, FILM COATED ORAL DAILY
Status: DISCONTINUED | OUTPATIENT
Start: 2021-01-01 | End: 2021-01-01

## 2021-01-01 RX ORDER — BUMETANIDE 1 MG/1
2 TABLET ORAL DAILY
Status: DISCONTINUED | OUTPATIENT
Start: 2021-01-01 | End: 2021-01-01

## 2021-01-01 RX ORDER — SEVELAMER CARBONATE 800 MG/1
1600 TABLET, FILM COATED ORAL
Status: DISCONTINUED | OUTPATIENT
Start: 2021-01-01 | End: 2021-01-01

## 2021-01-01 RX ORDER — AMLODIPINE BESYLATE 10 MG/1
10 TABLET ORAL DAILY
Status: DISCONTINUED | OUTPATIENT
Start: 2021-01-01 | End: 2021-01-01 | Stop reason: HOSPADM

## 2021-01-01 RX ORDER — OXYBUTYNIN CHLORIDE 10 MG/1
10 TABLET, EXTENDED RELEASE ORAL DAILY
Status: DISCONTINUED | OUTPATIENT
Start: 2021-01-01 | End: 2021-01-01

## 2021-01-01 RX ORDER — AMITRIPTYLINE HYDROCHLORIDE 10 MG/1
10 TABLET, FILM COATED ORAL NIGHTLY
Status: DISCONTINUED | OUTPATIENT
Start: 2021-01-01 | End: 2021-01-01

## 2021-01-01 RX ORDER — AMOXICILLIN 250 MG
1 CAPSULE ORAL DAILY
Status: DISCONTINUED | OUTPATIENT
Start: 2021-01-01 | End: 2021-01-01

## 2021-01-01 RX ORDER — AMITRIPTYLINE HYDROCHLORIDE 50 MG/1
50 TABLET, FILM COATED ORAL NIGHTLY
Status: DISCONTINUED | OUTPATIENT
Start: 2021-01-01 | End: 2021-01-01

## 2021-01-01 RX ORDER — AMOXICILLIN 250 MG
1 CAPSULE ORAL 2 TIMES DAILY
Status: DISCONTINUED | OUTPATIENT
Start: 2021-01-01 | End: 2021-01-01

## 2021-01-01 RX ORDER — AMMONIUM LACTATE 12 G/100G
LOTION TOPICAL
Qty: 1 BOTTLE | Refills: 6 | Status: SHIPPED | OUTPATIENT
Start: 2021-01-01 | End: 2021-01-01 | Stop reason: SDUPTHER

## 2021-01-01 RX ORDER — LEVETIRACETAM 500 MG/1
500 TABLET ORAL 2 TIMES DAILY
Status: DISCONTINUED | OUTPATIENT
Start: 2021-01-01 | End: 2021-01-01 | Stop reason: HOSPADM

## 2021-01-01 RX ORDER — CALCIUM CARBONATE 500(1250)
1000 TABLET ORAL DAILY
Qty: 30 TABLET | Refills: 1 | Status: CANCELLED | COMMUNITY
Start: 2021-01-01 | End: 2021-01-01

## 2021-01-01 RX ORDER — TALC
9 POWDER (GRAM) TOPICAL NIGHTLY PRN
Status: DISCONTINUED | OUTPATIENT
Start: 2021-01-01 | End: 2021-01-01 | Stop reason: HOSPADM

## 2021-01-01 RX ORDER — PSEUDOEPHEDRINE/ACETAMINOPHEN 30MG-500MG
100 TABLET ORAL ONCE
Status: COMPLETED | OUTPATIENT
Start: 2021-01-01 | End: 2021-01-01

## 2021-01-01 RX ORDER — SYRING-NEEDL,DISP,INSUL,0.3 ML 29 G X1/2"
296 SYRINGE, EMPTY DISPOSABLE MISCELLANEOUS ONCE
Status: DISCONTINUED | OUTPATIENT
Start: 2021-01-01 | End: 2021-01-01 | Stop reason: HOSPADM

## 2021-01-01 RX ORDER — TAMSULOSIN HYDROCHLORIDE 0.4 MG/1
0.4 CAPSULE ORAL DAILY
Qty: 30 CAPSULE | Refills: 12 | Status: SHIPPED | OUTPATIENT
Start: 2021-01-01 | End: 2021-01-01 | Stop reason: SDUPTHER

## 2021-01-01 RX ORDER — AMOXICILLIN 250 MG
2 CAPSULE ORAL 2 TIMES DAILY
Status: DISCONTINUED | OUTPATIENT
Start: 2021-01-01 | End: 2021-01-01

## 2021-01-01 RX ORDER — HYDRALAZINE HYDROCHLORIDE 20 MG/ML
10 INJECTION INTRAMUSCULAR; INTRAVENOUS EVERY 4 HOURS PRN
Status: DISCONTINUED | OUTPATIENT
Start: 2021-01-01 | End: 2021-01-01

## 2021-01-01 RX ORDER — FUROSEMIDE 10 MG/ML
120 INJECTION INTRAMUSCULAR; INTRAVENOUS ONCE
Status: COMPLETED | OUTPATIENT
Start: 2021-01-01 | End: 2021-01-01

## 2021-01-01 RX ORDER — CIPROFLOXACIN 500 MG/1
500 TABLET ORAL EVERY 12 HOURS
Qty: 7 TABLET | Refills: 0 | Status: SHIPPED | OUTPATIENT
Start: 2021-01-01 | End: 2021-01-01

## 2021-01-01 RX ORDER — CEPHALEXIN 500 MG/1
500 CAPSULE ORAL EVERY 8 HOURS
Qty: 20 CAPSULE | Refills: 0 | Status: SHIPPED | OUTPATIENT
Start: 2021-01-01 | End: 2021-01-01

## 2021-01-01 RX ORDER — FENTANYL CITRATE-0.9 % NACL/PF 10 MCG/ML
0-250 PLASTIC BAG, INJECTION (ML) INTRAVENOUS CONTINUOUS
Status: DISCONTINUED | OUTPATIENT
Start: 2021-01-01 | End: 2021-01-01

## 2021-01-01 RX ORDER — PROPOFOL 10 MG/ML
VIAL (ML) INTRAVENOUS
Status: DISCONTINUED | OUTPATIENT
Start: 2021-01-01 | End: 2021-01-01

## 2021-01-01 RX ORDER — LABETALOL HCL 20 MG/4 ML
10 SYRINGE (ML) INTRAVENOUS EVERY 4 HOURS PRN
Status: DISCONTINUED | OUTPATIENT
Start: 2021-01-01 | End: 2021-01-01

## 2021-01-01 RX ORDER — LORAZEPAM 2 MG/ML
1 INJECTION INTRAMUSCULAR EVERY 30 MIN PRN
Status: DISCONTINUED | OUTPATIENT
Start: 2021-01-01 | End: 2021-01-01 | Stop reason: HOSPADM

## 2021-01-01 RX ORDER — TAMSULOSIN HYDROCHLORIDE 0.4 MG/1
0.4 CAPSULE ORAL DAILY
Status: DISCONTINUED | OUTPATIENT
Start: 2021-01-01 | End: 2021-01-01 | Stop reason: HOSPADM

## 2021-01-01 RX ORDER — AMOXICILLIN 250 MG
1 CAPSULE ORAL DAILY
Qty: 30 TABLET | Refills: 0 | Status: ON HOLD | OUTPATIENT
Start: 2021-01-01 | End: 2021-01-01 | Stop reason: HOSPADM

## 2021-01-01 RX ORDER — SODIUM CHLORIDE, SODIUM LACTATE, POTASSIUM CHLORIDE, CALCIUM CHLORIDE 600; 310; 30; 20 MG/100ML; MG/100ML; MG/100ML; MG/100ML
INJECTION, SOLUTION INTRAVENOUS CONTINUOUS
Status: DISCONTINUED | OUTPATIENT
Start: 2021-01-01 | End: 2021-01-01

## 2021-01-01 RX ORDER — MORPHINE SULFATE 2 MG/ML
2 INJECTION, SOLUTION INTRAMUSCULAR; INTRAVENOUS ONCE
Status: COMPLETED | OUTPATIENT
Start: 2021-01-01 | End: 2021-01-01

## 2021-01-01 RX ORDER — FUROSEMIDE 20 MG/1
20 TABLET ORAL DAILY
Qty: 30 TABLET | Refills: 3 | Status: SHIPPED | OUTPATIENT
Start: 2021-01-01 | End: 2021-01-01 | Stop reason: SDUPTHER

## 2021-01-01 RX ORDER — FUROSEMIDE 40 MG/1
40 TABLET ORAL 2 TIMES DAILY
Qty: 60 TABLET | Refills: 11 | Status: SHIPPED | OUTPATIENT
Start: 2021-01-01 | End: 2022-05-17

## 2021-01-01 RX ORDER — HEPARIN 100 UNIT/ML
500 SYRINGE INTRAVENOUS
Status: CANCELLED | OUTPATIENT
Start: 2021-01-01

## 2021-01-01 RX ORDER — IBUPROFEN 200 MG
16 TABLET ORAL
Status: DISCONTINUED | OUTPATIENT
Start: 2021-01-01 | End: 2021-01-01

## 2021-01-01 RX ORDER — TAMSULOSIN HYDROCHLORIDE 0.4 MG/1
0.4 CAPSULE ORAL DAILY
Qty: 30 CAPSULE | Refills: 3 | Status: SHIPPED | OUTPATIENT
Start: 2021-01-01 | End: 2021-01-01 | Stop reason: SDUPTHER

## 2021-01-01 RX ORDER — ACETAMINOPHEN 325 MG/1
650 TABLET ORAL EVERY 8 HOURS PRN
Status: DISCONTINUED | OUTPATIENT
Start: 2021-01-01 | End: 2021-01-01 | Stop reason: HOSPADM

## 2021-01-01 RX ORDER — FUROSEMIDE 40 MG/1
80 TABLET ORAL 2 TIMES DAILY
Status: DISCONTINUED | OUTPATIENT
Start: 2021-01-01 | End: 2021-01-01

## 2021-01-01 RX ORDER — LORAZEPAM 2 MG/ML
0.5 INJECTION INTRAMUSCULAR EVERY 30 MIN PRN
Status: DISCONTINUED | OUTPATIENT
Start: 2021-01-01 | End: 2021-01-01

## 2021-01-01 RX ORDER — ROCURONIUM BROMIDE 10 MG/ML
INJECTION, SOLUTION INTRAVENOUS
Status: DISCONTINUED | OUTPATIENT
Start: 2021-01-01 | End: 2021-01-01

## 2021-01-01 RX ORDER — AMLODIPINE BESYLATE 5 MG/1
5 TABLET ORAL DAILY
Status: DISCONTINUED | OUTPATIENT
Start: 2021-01-01 | End: 2021-01-01 | Stop reason: HOSPADM

## 2021-01-01 RX ORDER — LACTULOSE 10 G/15ML
30 SOLUTION ORAL 3 TIMES DAILY
Status: DISCONTINUED | OUTPATIENT
Start: 2021-01-01 | End: 2021-01-01 | Stop reason: HOSPADM

## 2021-01-01 RX ORDER — FUROSEMIDE 10 MG/ML
20 INJECTION INTRAMUSCULAR; INTRAVENOUS
Status: CANCELLED | OUTPATIENT
Start: 2021-01-01

## 2021-01-01 RX ORDER — HYDRALAZINE HYDROCHLORIDE 20 MG/ML
10 INJECTION INTRAMUSCULAR; INTRAVENOUS EVERY 8 HOURS PRN
Status: DISCONTINUED | OUTPATIENT
Start: 2021-01-01 | End: 2021-01-01

## 2021-01-01 RX ORDER — GUAIFENESIN 600 MG/1
600 TABLET, EXTENDED RELEASE ORAL 2 TIMES DAILY
Status: DISCONTINUED | OUTPATIENT
Start: 2021-01-01 | End: 2021-01-01

## 2021-01-01 RX ORDER — ONDANSETRON 2 MG/ML
INJECTION INTRAMUSCULAR; INTRAVENOUS
Status: DISCONTINUED | OUTPATIENT
Start: 2021-01-01 | End: 2021-01-01

## 2021-01-01 RX ORDER — SCOLOPAMINE TRANSDERMAL SYSTEM 1 MG/1
1 PATCH, EXTENDED RELEASE TRANSDERMAL
Status: DISCONTINUED | OUTPATIENT
Start: 2021-01-01 | End: 2021-01-01 | Stop reason: HOSPADM

## 2021-01-01 RX ORDER — LEVETIRACETAM 5 MG/ML
500 INJECTION INTRAVASCULAR EVERY 12 HOURS
Status: DISCONTINUED | OUTPATIENT
Start: 2021-01-01 | End: 2021-01-01

## 2021-01-01 RX ORDER — LORAZEPAM 2 MG/ML
1 INJECTION INTRAMUSCULAR ONCE
Status: DISCONTINUED | OUTPATIENT
Start: 2021-01-01 | End: 2021-01-01

## 2021-01-01 RX ORDER — POTASSIUM CHLORIDE 7.45 MG/ML
80 INJECTION INTRAVENOUS
Status: DISCONTINUED | OUTPATIENT
Start: 2021-01-01 | End: 2021-01-01

## 2021-01-01 RX ORDER — CEPHALEXIN 500 MG/1
500 CAPSULE ORAL 4 TIMES DAILY
Qty: 20 CAPSULE | Refills: 0 | Status: SHIPPED | OUTPATIENT
Start: 2021-01-01 | End: 2021-01-01 | Stop reason: SDUPTHER

## 2021-01-01 RX ORDER — LACTULOSE 10 G/15ML
30 SOLUTION ORAL 2 TIMES DAILY
Status: DISPENSED | OUTPATIENT
Start: 2021-01-01 | End: 2021-01-01

## 2021-01-01 RX ORDER — LACTULOSE 10 G/15ML
200 SOLUTION ORAL; RECTAL 2 TIMES DAILY
Status: DISCONTINUED | OUTPATIENT
Start: 2021-01-01 | End: 2021-01-01

## 2021-01-01 RX ORDER — SEVELAMER CARBONATE 800 MG/1
1600 TABLET, FILM COATED ORAL
Status: DISCONTINUED | OUTPATIENT
Start: 2021-01-01 | End: 2021-01-01 | Stop reason: HOSPADM

## 2021-01-01 RX ORDER — HYDROCODONE BITARTRATE AND ACETAMINOPHEN 500; 5 MG/1; MG/1
TABLET ORAL
Status: CANCELLED | OUTPATIENT
Start: 2021-01-01

## 2021-01-01 RX ORDER — SEVELAMER CARBONATE 800 MG/1
800 TABLET, FILM COATED ORAL
Status: DISCONTINUED | OUTPATIENT
Start: 2021-01-01 | End: 2021-01-01 | Stop reason: HOSPADM

## 2021-01-01 RX ORDER — ACETAMINOPHEN 325 MG/1
650 TABLET ORAL EVERY 4 HOURS PRN
Status: DISCONTINUED | OUTPATIENT
Start: 2021-01-01 | End: 2021-01-01 | Stop reason: HOSPADM

## 2021-01-01 RX ORDER — MAGNESIUM SULFATE HEPTAHYDRATE 40 MG/ML
4 INJECTION, SOLUTION INTRAVENOUS
Status: DISCONTINUED | OUTPATIENT
Start: 2021-01-01 | End: 2021-01-01

## 2021-01-01 RX ORDER — PREDNISONE 50 MG/1
50 TABLET ORAL
Status: DISCONTINUED | OUTPATIENT
Start: 2021-01-01 | End: 2021-01-01 | Stop reason: HOSPADM

## 2021-01-01 RX ORDER — POTASSIUM CHLORIDE 7.45 MG/ML
60 INJECTION INTRAVENOUS
Status: DISCONTINUED | OUTPATIENT
Start: 2021-01-01 | End: 2021-01-01

## 2021-01-01 RX ORDER — SYRING-NEEDL,DISP,INSUL,0.3 ML 29 G X1/2"
296 SYRINGE, EMPTY DISPOSABLE MISCELLANEOUS
Status: COMPLETED | OUTPATIENT
Start: 2021-01-01 | End: 2021-01-01

## 2021-01-01 RX ORDER — FENTANYL CITRATE 50 UG/ML
25 INJECTION, SOLUTION INTRAMUSCULAR; INTRAVENOUS EVERY 5 MIN PRN
Status: DISCONTINUED | OUTPATIENT
Start: 2021-01-01 | End: 2021-01-01 | Stop reason: HOSPADM

## 2021-01-01 RX ORDER — POLYETHYLENE GLYCOL 3350 17 G/17G
17 POWDER, FOR SOLUTION ORAL CONTINUOUS
Status: CANCELLED | OUTPATIENT
Start: 2021-01-01

## 2021-01-01 RX ORDER — HYDROCODONE BITARTRATE AND ACETAMINOPHEN 5; 325 MG/1; MG/1
1 TABLET ORAL EVERY 6 HOURS PRN
Status: DISCONTINUED | OUTPATIENT
Start: 2021-01-01 | End: 2021-01-01 | Stop reason: HOSPADM

## 2021-01-01 RX ORDER — GLUCAGON 1 MG
1 KIT INJECTION
Status: DISCONTINUED | OUTPATIENT
Start: 2021-01-01 | End: 2021-01-01 | Stop reason: HOSPADM

## 2021-01-01 RX ORDER — PSEUDOEPHEDRINE/ACETAMINOPHEN 30MG-500MG
100 TABLET ORAL ONCE
Status: DISCONTINUED | OUTPATIENT
Start: 2021-01-01 | End: 2021-01-01 | Stop reason: HOSPADM

## 2021-01-01 RX ORDER — TAMSULOSIN HYDROCHLORIDE 0.4 MG/1
0.4 CAPSULE ORAL DAILY
Qty: 30 CAPSULE | Refills: 0 | Status: SHIPPED | OUTPATIENT
Start: 2021-01-01 | End: 2021-01-01 | Stop reason: SDUPTHER

## 2021-01-01 RX ORDER — ONDANSETRON 2 MG/ML
4 INJECTION INTRAMUSCULAR; INTRAVENOUS ONCE AS NEEDED
Status: COMPLETED | OUTPATIENT
Start: 2021-01-01 | End: 2021-01-01

## 2021-01-01 RX ORDER — PANCRELIPASE 60000; 12000; 38000 [USP'U]/1; [USP'U]/1; [USP'U]/1
1 CAPSULE, DELAYED RELEASE PELLETS ORAL
Qty: 90 CAPSULE | Refills: 6 | Status: SHIPPED | OUTPATIENT
Start: 2021-01-01 | End: 2021-08-30

## 2021-01-01 RX ORDER — CLONIDINE HYDROCHLORIDE 0.1 MG/1
0.1 TABLET ORAL EVERY 6 HOURS PRN
Status: DISCONTINUED | OUTPATIENT
Start: 2021-01-01 | End: 2021-01-01 | Stop reason: HOSPADM

## 2021-01-01 RX ORDER — SODIUM CHLORIDE 9 MG/ML
INJECTION, SOLUTION INTRAVENOUS
Status: COMPLETED | OUTPATIENT
Start: 2021-01-01 | End: 2021-01-01

## 2021-01-01 RX ORDER — AMLODIPINE BESYLATE 5 MG/1
5 TABLET ORAL DAILY
Status: DISCONTINUED | OUTPATIENT
Start: 2021-01-01 | End: 2021-01-01

## 2021-01-01 RX ORDER — GLUCAGON 1 MG
1 KIT INJECTION
Status: DISCONTINUED | OUTPATIENT
Start: 2021-01-01 | End: 2021-01-01

## 2021-01-01 RX ORDER — OXYMETAZOLINE HCL 0.05 %
2 SPRAY, NON-AEROSOL (ML) NASAL 2 TIMES DAILY
Status: DISCONTINUED | OUTPATIENT
Start: 2021-01-01 | End: 2021-01-01

## 2021-01-01 RX ORDER — MIRABEGRON 50 MG/1
1 TABLET, FILM COATED, EXTENDED RELEASE ORAL DAILY
Qty: 30 TABLET | Refills: 11
Start: 2021-01-01 | End: 2021-01-01

## 2021-01-01 RX ORDER — SODIUM CHLORIDE, SODIUM LACTATE, POTASSIUM CHLORIDE, CALCIUM CHLORIDE 600; 310; 30; 20 MG/100ML; MG/100ML; MG/100ML; MG/100ML
INJECTION, SOLUTION INTRAVENOUS CONTINUOUS
Status: ACTIVE | OUTPATIENT
Start: 2021-01-01 | End: 2021-01-01

## 2021-01-01 RX ORDER — AMMONIUM LACTATE 12 G/100G
LOTION TOPICAL
Status: DISCONTINUED | OUTPATIENT
Start: 2021-01-01 | End: 2021-01-01 | Stop reason: HOSPADM

## 2021-01-01 RX ORDER — POLYETHYLENE GLYCOL 3350 17 G/17G
17 POWDER, FOR SOLUTION ORAL 2 TIMES DAILY
Status: DISCONTINUED | OUTPATIENT
Start: 2021-01-01 | End: 2021-01-01 | Stop reason: HOSPADM

## 2021-01-01 RX ORDER — FUROSEMIDE 20 MG/1
20 TABLET ORAL DAILY
Qty: 30 TABLET | Refills: 0 | Status: SHIPPED | OUTPATIENT
Start: 2021-01-01 | End: 2021-01-01 | Stop reason: SDUPTHER

## 2021-01-01 RX ORDER — FENTANYL CITRATE 50 UG/ML
12.5 INJECTION, SOLUTION INTRAMUSCULAR; INTRAVENOUS ONCE
Status: COMPLETED | OUTPATIENT
Start: 2021-01-01 | End: 2021-01-01

## 2021-01-01 RX ORDER — FLUTICASONE PROPIONATE 50 MCG
2 SPRAY, SUSPENSION (ML) NASAL DAILY
Status: DISCONTINUED | OUTPATIENT
Start: 2021-01-01 | End: 2021-01-01

## 2021-01-01 RX ORDER — LEVETIRACETAM 500 MG/1
500 TABLET ORAL 2 TIMES DAILY
Qty: 60 TABLET | Refills: 11 | Status: SHIPPED | OUTPATIENT
Start: 2021-01-01 | End: 2022-05-16

## 2021-01-01 RX ORDER — SEVELAMER CARBONATE 800 MG/1
800 TABLET, FILM COATED ORAL
Status: DISCONTINUED | OUTPATIENT
Start: 2021-01-01 | End: 2021-01-01

## 2021-01-01 RX ORDER — IBUPROFEN 200 MG
24 TABLET ORAL
Status: DISCONTINUED | OUTPATIENT
Start: 2021-01-01 | End: 2021-01-01

## 2021-01-01 RX ORDER — HYDROMORPHONE HYDROCHLORIDE 1 MG/ML
0.2 INJECTION, SOLUTION INTRAMUSCULAR; INTRAVENOUS; SUBCUTANEOUS EVERY 5 MIN PRN
Status: DISCONTINUED | OUTPATIENT
Start: 2021-01-01 | End: 2021-01-01

## 2021-01-01 RX ORDER — TAMSULOSIN HYDROCHLORIDE 0.4 MG/1
0.4 CAPSULE ORAL DAILY
Qty: 7 CAPSULE | Refills: 0 | Status: SHIPPED | OUTPATIENT
Start: 2021-01-01 | End: 2021-01-01

## 2021-01-01 RX ORDER — DIPHENHYDRAMINE HCL 50 MG
50 CAPSULE ORAL
Status: DISCONTINUED | OUTPATIENT
Start: 2021-01-01 | End: 2021-01-01 | Stop reason: HOSPADM

## 2021-01-01 RX ORDER — FENTANYL CITRATE 50 UG/ML
INJECTION, SOLUTION INTRAMUSCULAR; INTRAVENOUS
Status: DISCONTINUED | OUTPATIENT
Start: 2021-01-01 | End: 2021-01-01

## 2021-01-01 RX ORDER — MUPIROCIN 20 MG/G
OINTMENT TOPICAL 2 TIMES DAILY
Status: DISCONTINUED | OUTPATIENT
Start: 2021-01-01 | End: 2021-01-01

## 2021-01-01 RX ORDER — AMLODIPINE BESYLATE 10 MG/1
10 TABLET ORAL DAILY
Qty: 30 TABLET | Refills: 11 | Status: ON HOLD | OUTPATIENT
Start: 2021-01-01 | End: 2021-01-01 | Stop reason: SDUPTHER

## 2021-01-01 RX ORDER — PSEUDOEPHEDRINE/ACETAMINOPHEN 30MG-500MG
100 TABLET ORAL ONCE
Status: DISCONTINUED | OUTPATIENT
Start: 2021-01-01 | End: 2021-01-01

## 2021-01-01 RX ORDER — FUROSEMIDE 10 MG/ML
120 INJECTION INTRAMUSCULAR; INTRAVENOUS
Status: DISCONTINUED | OUTPATIENT
Start: 2021-01-01 | End: 2021-01-01

## 2021-01-01 RX ORDER — SYRING-NEEDL,DISP,INSUL,0.3 ML 29 G X1/2"
148 SYRINGE, EMPTY DISPOSABLE MISCELLANEOUS ONCE
Status: COMPLETED | OUTPATIENT
Start: 2021-01-01 | End: 2021-01-01

## 2021-01-01 RX ORDER — AMOXICILLIN 250 MG
6 CAPSULE ORAL DAILY
Qty: 180 TABLET | Refills: 0 | Status: SHIPPED | OUTPATIENT
Start: 2021-01-01

## 2021-01-01 RX ORDER — TAMSULOSIN HYDROCHLORIDE 0.4 MG/1
0.4 CAPSULE ORAL DAILY
Status: DISCONTINUED | OUTPATIENT
Start: 2021-01-01 | End: 2021-01-01

## 2021-01-01 RX ORDER — FUROSEMIDE 10 MG/ML
80 INJECTION INTRAMUSCULAR; INTRAVENOUS ONCE
Status: DISCONTINUED | OUTPATIENT
Start: 2021-01-01 | End: 2021-01-01

## 2021-01-01 RX ORDER — PROCHLORPERAZINE MALEATE 5 MG
10 TABLET ORAL EVERY 6 HOURS PRN
Status: DISCONTINUED | OUTPATIENT
Start: 2021-01-01 | End: 2021-01-01 | Stop reason: HOSPADM

## 2021-01-01 RX ORDER — LORAZEPAM 1 MG/1
1 TABLET ORAL NIGHTLY
Status: DISCONTINUED | OUTPATIENT
Start: 2021-01-01 | End: 2021-01-01 | Stop reason: HOSPADM

## 2021-01-01 RX ORDER — SYRING-NEEDL,DISP,INSUL,0.3 ML 29 G X1/2"
148 SYRINGE, EMPTY DISPOSABLE MISCELLANEOUS ONCE
Status: DISCONTINUED | OUTPATIENT
Start: 2021-01-01 | End: 2021-01-01

## 2021-01-01 RX ORDER — LORAZEPAM 0.5 MG/1
0.5 TABLET ORAL 2 TIMES DAILY PRN
Status: DISCONTINUED | OUTPATIENT
Start: 2021-01-01 | End: 2021-01-01 | Stop reason: HOSPADM

## 2021-01-01 RX ORDER — APIXABAN 5 MG/1
TABLET, FILM COATED ORAL
Qty: 60 TABLET | Refills: 6 | OUTPATIENT
Start: 2021-01-01

## 2021-01-01 RX ORDER — FUROSEMIDE 20 MG/1
20 TABLET ORAL DAILY
Qty: 7 TABLET | Refills: 0 | Status: SHIPPED | OUTPATIENT
Start: 2021-01-01 | End: 2021-01-01

## 2021-01-01 RX ORDER — FUROSEMIDE 10 MG/ML
60 INJECTION INTRAMUSCULAR; INTRAVENOUS
Status: COMPLETED | OUTPATIENT
Start: 2021-01-01 | End: 2021-01-01

## 2021-01-01 RX ORDER — IPRATROPIUM BROMIDE AND ALBUTEROL SULFATE 2.5; .5 MG/3ML; MG/3ML
3 SOLUTION RESPIRATORY (INHALATION) EVERY 6 HOURS PRN
Status: DISCONTINUED | OUTPATIENT
Start: 2021-01-01 | End: 2021-01-01 | Stop reason: HOSPADM

## 2021-01-01 RX ORDER — SILODOSIN 4 MG/1
4 CAPSULE ORAL DAILY
Status: DISCONTINUED | OUTPATIENT
Start: 2021-01-01 | End: 2021-01-01

## 2021-01-01 RX ORDER — LEVETIRACETAM 500 MG/1
500 TABLET ORAL 2 TIMES DAILY
Status: DISCONTINUED | OUTPATIENT
Start: 2021-01-01 | End: 2021-01-01

## 2021-01-01 RX ORDER — IBUPROFEN 200 MG
16 TABLET ORAL
Status: DISCONTINUED | OUTPATIENT
Start: 2021-01-01 | End: 2021-01-01 | Stop reason: HOSPADM

## 2021-01-01 RX ORDER — FUROSEMIDE 10 MG/ML
80 INJECTION INTRAMUSCULAR; INTRAVENOUS
Status: DISCONTINUED | OUTPATIENT
Start: 2021-01-01 | End: 2021-01-01

## 2021-01-01 RX ORDER — LORAZEPAM 2 MG/ML
INJECTION INTRAMUSCULAR
Status: COMPLETED
Start: 2021-01-01 | End: 2021-01-01

## 2021-01-01 RX ORDER — LORAZEPAM 1 MG/1
TABLET ORAL
Qty: 60 TABLET | Refills: 3 | Status: ON HOLD | OUTPATIENT
Start: 2021-01-01 | End: 2021-01-01 | Stop reason: HOSPADM

## 2021-01-01 RX ORDER — CALCIUM CARBONATE 500(1250)
1000 TABLET ORAL DAILY
Status: DISCONTINUED | OUTPATIENT
Start: 2021-01-01 | End: 2021-01-01

## 2021-01-01 RX ORDER — POLYETHYLENE GLYCOL 3350, SODIUM SULFATE, SODIUM CHLORIDE, POTASSIUM CHLORIDE, SODIUM ASCORBATE, AND ASCORBIC ACID 7.5-2.691G
2000 KIT ORAL ONCE
Status: COMPLETED | OUTPATIENT
Start: 2021-01-01 | End: 2021-01-01

## 2021-01-01 RX ORDER — LEVOTHYROXINE SODIUM 25 UG/1
25 TABLET ORAL
Status: DISCONTINUED | OUTPATIENT
Start: 2021-01-01 | End: 2021-01-01

## 2021-01-01 RX ORDER — NITROFURANTOIN 25; 75 MG/1; MG/1
100 CAPSULE ORAL 2 TIMES DAILY
Qty: 10 CAPSULE | Refills: 0 | Status: SHIPPED | OUTPATIENT
Start: 2021-01-01 | End: 2021-01-01 | Stop reason: ALTCHOICE

## 2021-01-01 RX ORDER — MORPHINE SULFATE IN 0.9 % NACL 30 MG/30ML
0-10 PATIENT CONTROLLED ANALGESIA SYRINGE INTRAVENOUS CONTINUOUS
Status: DISCONTINUED | OUTPATIENT
Start: 2021-01-01 | End: 2021-01-01 | Stop reason: HOSPADM

## 2021-01-01 RX ORDER — INSULIN ASPART 100 [IU]/ML
1-10 INJECTION, SOLUTION INTRAVENOUS; SUBCUTANEOUS EVERY 6 HOURS PRN
Status: DISCONTINUED | OUTPATIENT
Start: 2021-01-01 | End: 2021-01-01

## 2021-01-01 RX ORDER — ASPIRIN 81 MG/1
81 TABLET ORAL DAILY
Status: DISCONTINUED | OUTPATIENT
Start: 2021-01-01 | End: 2021-01-01

## 2021-01-01 RX ORDER — ONDANSETRON 8 MG/1
8 TABLET, ORALLY DISINTEGRATING ORAL EVERY 8 HOURS PRN
Status: DISCONTINUED | OUTPATIENT
Start: 2021-01-01 | End: 2021-01-01 | Stop reason: HOSPADM

## 2021-01-01 RX ORDER — CALCITRIOL 0.5 UG/1
0.5 CAPSULE ORAL DAILY
Qty: 30 CAPSULE | Refills: 2 | Status: ON HOLD | OUTPATIENT
Start: 2021-01-01 | End: 2021-01-01 | Stop reason: HOSPADM

## 2021-01-01 RX ORDER — FUROSEMIDE 10 MG/ML
40 INJECTION INTRAMUSCULAR; INTRAVENOUS ONCE
Status: COMPLETED | OUTPATIENT
Start: 2021-01-01 | End: 2021-01-01

## 2021-01-01 RX ORDER — BISACODYL 10 MG
10 SUPPOSITORY, RECTAL RECTAL DAILY PRN
Status: DISCONTINUED | OUTPATIENT
Start: 2021-01-01 | End: 2021-01-01 | Stop reason: HOSPADM

## 2021-01-01 RX ORDER — DIPHENHYDRAMINE HCL 25 MG
25 CAPSULE ORAL EVERY 6 HOURS PRN
Status: ACTIVE | OUTPATIENT
Start: 2021-01-01 | End: 2021-01-01

## 2021-01-01 RX ORDER — CETIRIZINE HYDROCHLORIDE 5 MG/1
5 TABLET ORAL NIGHTLY
Status: DISCONTINUED | OUTPATIENT
Start: 2021-01-01 | End: 2021-01-01

## 2021-01-01 RX ORDER — INSULIN ASPART 100 [IU]/ML
1-10 INJECTION, SOLUTION INTRAVENOUS; SUBCUTANEOUS EVERY 4 HOURS PRN
Status: DISCONTINUED | OUTPATIENT
Start: 2021-01-01 | End: 2021-01-01

## 2021-01-01 RX ORDER — NAPROXEN SODIUM 220 MG/1
324 TABLET, FILM COATED ORAL
Status: COMPLETED | OUTPATIENT
Start: 2021-01-01 | End: 2021-01-01

## 2021-01-01 RX ORDER — BENZONATATE 100 MG/1
100 CAPSULE ORAL 3 TIMES DAILY PRN
Qty: 60 CAPSULE | Refills: 1 | Status: SHIPPED | OUTPATIENT
Start: 2021-01-01

## 2021-01-01 RX ORDER — MORPHINE SULFATE 2 MG/ML
2 INJECTION, SOLUTION INTRAMUSCULAR; INTRAVENOUS
Status: DISCONTINUED | OUTPATIENT
Start: 2021-01-01 | End: 2021-01-01 | Stop reason: HOSPADM

## 2021-01-01 RX ORDER — SODIUM BICARBONATE 650 MG/1
1300 TABLET ORAL 3 TIMES DAILY
Status: DISCONTINUED | OUTPATIENT
Start: 2021-01-01 | End: 2021-01-01 | Stop reason: HOSPADM

## 2021-01-01 RX ORDER — SENNOSIDES 8.6 MG/1
8.6 TABLET ORAL 2 TIMES DAILY
Status: DISCONTINUED | OUTPATIENT
Start: 2021-01-01 | End: 2021-01-01 | Stop reason: HOSPADM

## 2021-01-01 RX ORDER — POLYETHYLENE GLYCOL 3350 17 G/17G
17 POWDER, FOR SOLUTION ORAL DAILY PRN
Status: DISCONTINUED | OUTPATIENT
Start: 2021-01-01 | End: 2021-01-01 | Stop reason: HOSPADM

## 2021-01-01 RX ORDER — FUROSEMIDE 10 MG/ML
40 INJECTION INTRAMUSCULAR; INTRAVENOUS ONCE
Status: DISCONTINUED | OUTPATIENT
Start: 2021-01-01 | End: 2021-01-01 | Stop reason: HOSPADM

## 2021-01-01 RX ORDER — ONDANSETRON 8 MG/1
8 TABLET, ORALLY DISINTEGRATING ORAL EVERY 8 HOURS PRN
Status: DISCONTINUED | OUTPATIENT
Start: 2021-01-01 | End: 2021-01-01

## 2021-01-01 RX ORDER — LIDOCAINE HYDROCHLORIDE 20 MG/ML
INJECTION, SOLUTION EPIDURAL; INFILTRATION; INTRACAUDAL; PERINEURAL
Status: DISCONTINUED | OUTPATIENT
Start: 2021-01-01 | End: 2021-01-01

## 2021-01-01 RX ORDER — CEFAZOLIN SODIUM 1 G/3ML
INJECTION, POWDER, FOR SOLUTION INTRAMUSCULAR; INTRAVENOUS
Status: DISCONTINUED | OUTPATIENT
Start: 2021-01-01 | End: 2021-01-01

## 2021-01-01 RX ORDER — POLYETHYLENE GLYCOL 3350 17 G/17G
17 POWDER, FOR SOLUTION ORAL DAILY
Status: CANCELLED | OUTPATIENT
Start: 2021-01-01

## 2021-01-01 RX ORDER — PHENYLEPHRINE HCL IN 0.9% NACL 1 MG/10 ML
SYRINGE (ML) INTRAVENOUS
Status: DISPENSED
Start: 2021-01-01 | End: 2021-01-01

## 2021-01-01 RX ORDER — LORAZEPAM 1 MG/1
1 TABLET ORAL ONCE
Status: COMPLETED | OUTPATIENT
Start: 2021-01-01 | End: 2021-01-01

## 2021-01-01 RX ORDER — SODIUM CHLORIDE 9 MG/ML
INJECTION, SOLUTION INTRAVENOUS CONTINUOUS PRN
Status: DISCONTINUED | OUTPATIENT
Start: 2021-01-01 | End: 2021-01-01

## 2021-01-01 RX ORDER — FUROSEMIDE 40 MG/1
40 TABLET ORAL 2 TIMES DAILY
Status: DISCONTINUED | OUTPATIENT
Start: 2021-01-01 | End: 2021-01-01

## 2021-01-01 RX ORDER — LIDOCAINE HYDROCHLORIDE 10 MG/ML
INJECTION INFILTRATION; PERINEURAL CODE/TRAUMA/SEDATION MEDICATION
Status: COMPLETED | OUTPATIENT
Start: 2021-01-01 | End: 2021-01-01

## 2021-01-01 RX ORDER — BISACODYL 5 MG
5 TABLET, DELAYED RELEASE (ENTERIC COATED) ORAL DAILY PRN
Status: DISCONTINUED | OUTPATIENT
Start: 2021-01-01 | End: 2021-01-01 | Stop reason: HOSPADM

## 2021-01-01 RX ORDER — ACETAMINOPHEN 325 MG/1
650 TABLET ORAL EVERY 4 HOURS PRN
Status: DISCONTINUED | OUTPATIENT
Start: 2021-01-01 | End: 2021-01-01

## 2021-01-01 RX ORDER — ACETAMINOPHEN 325 MG/1
650 TABLET ORAL EVERY 6 HOURS PRN
Status: ACTIVE | OUTPATIENT
Start: 2021-01-01 | End: 2021-01-01

## 2021-01-01 RX ORDER — BUPIVACAINE HYDROCHLORIDE 2.5 MG/ML
INJECTION, SOLUTION EPIDURAL; INFILTRATION; INTRACAUDAL
Status: DISCONTINUED | OUTPATIENT
Start: 2021-01-01 | End: 2021-01-01 | Stop reason: HOSPADM

## 2021-01-01 RX ORDER — AMMONIUM LACTATE 12 G/100G
LOTION TOPICAL 2 TIMES DAILY PRN
Qty: 1 BOTTLE | Refills: 6 | Status: SHIPPED | OUTPATIENT
Start: 2021-01-01 | End: 2021-01-01

## 2021-01-01 RX ORDER — HEPARIN SODIUM 5000 [USP'U]/ML
5000 INJECTION, SOLUTION INTRAVENOUS; SUBCUTANEOUS EVERY 8 HOURS
Status: DISCONTINUED | OUTPATIENT
Start: 2021-01-01 | End: 2021-01-01

## 2021-01-01 RX ORDER — DIPHENHYDRAMINE HCL 25 MG
25 CAPSULE ORAL
Status: CANCELLED | OUTPATIENT
Start: 2021-01-01

## 2021-01-01 RX ORDER — MAGNESIUM SULFATE HEPTAHYDRATE 40 MG/ML
2 INJECTION, SOLUTION INTRAVENOUS
Status: DISCONTINUED | OUTPATIENT
Start: 2021-01-01 | End: 2021-01-01

## 2021-01-01 RX ORDER — MAGNESIUM SULFATE HEPTAHYDRATE 40 MG/ML
2 INJECTION, SOLUTION INTRAVENOUS ONCE
Status: COMPLETED | OUTPATIENT
Start: 2021-01-01 | End: 2021-01-01

## 2021-01-01 RX ORDER — SODIUM BICARBONATE 650 MG/1
1300 TABLET ORAL 3 TIMES DAILY
Qty: 180 TABLET | Refills: 11 | Status: ON HOLD | COMMUNITY
Start: 2021-01-01 | End: 2021-01-01 | Stop reason: HOSPADM

## 2021-01-01 RX ORDER — SYRING-NEEDL,DISP,INSUL,0.3 ML 29 G X1/2"
296 SYRINGE, EMPTY DISPOSABLE MISCELLANEOUS ONCE
Status: COMPLETED | OUTPATIENT
Start: 2021-01-01 | End: 2021-01-01

## 2021-01-01 RX ORDER — FUROSEMIDE 20 MG/1
40 TABLET ORAL DAILY
Qty: 30 TABLET | Refills: 3 | Status: ON HOLD | OUTPATIENT
Start: 2021-01-01 | End: 2021-01-01 | Stop reason: HOSPADM

## 2021-01-01 RX ORDER — TORSEMIDE 20 MG/1
20 TABLET ORAL ONCE
Status: COMPLETED | OUTPATIENT
Start: 2021-01-01 | End: 2021-01-01

## 2021-01-01 RX ORDER — TALC
6 POWDER (GRAM) TOPICAL NIGHTLY PRN
Status: DISCONTINUED | OUTPATIENT
Start: 2021-01-01 | End: 2021-01-01

## 2021-01-01 RX ORDER — ACETAMINOPHEN 325 MG/1
650 TABLET ORAL
Status: CANCELLED | OUTPATIENT
Start: 2021-01-01

## 2021-01-01 RX ORDER — DEXAMETHASONE SODIUM PHOSPHATE 4 MG/ML
INJECTION, SOLUTION INTRA-ARTICULAR; INTRALESIONAL; INTRAMUSCULAR; INTRAVENOUS; SOFT TISSUE
Status: DISCONTINUED | OUTPATIENT
Start: 2021-01-01 | End: 2021-01-01

## 2021-01-01 RX ORDER — POLYETHYLENE GLYCOL 3350 17 G/17G
17 POWDER, FOR SOLUTION ORAL
Status: DISPENSED | OUTPATIENT
Start: 2021-01-01 | End: 2021-01-01

## 2021-01-01 RX ORDER — MUPIROCIN 20 MG/G
OINTMENT TOPICAL
Status: CANCELLED | OUTPATIENT
Start: 2021-01-01

## 2021-01-01 RX ORDER — SODIUM CHLORIDE 9 MG/ML
INJECTION, SOLUTION INTRAVENOUS CONTINUOUS
Status: CANCELLED | OUTPATIENT
Start: 2021-01-01

## 2021-01-01 RX ORDER — HEPARIN SODIUM 1000 [USP'U]/ML
INJECTION, SOLUTION INTRAVENOUS; SUBCUTANEOUS
Status: DISCONTINUED | OUTPATIENT
Start: 2021-01-01 | End: 2021-01-01 | Stop reason: HOSPADM

## 2021-01-01 RX ORDER — SEVELAMER CARBONATE FOR ORAL SUSPENSION 2400 MG/1
2.4 POWDER, FOR SUSPENSION ORAL 2 TIMES DAILY
Status: DISCONTINUED | OUTPATIENT
Start: 2021-01-01 | End: 2021-01-01

## 2021-01-01 RX ORDER — ERGOCALCIFEROL 1.25 MG/1
50000 CAPSULE ORAL
Status: DISCONTINUED | OUTPATIENT
Start: 2021-01-01 | End: 2021-01-01

## 2021-01-01 RX ORDER — BENZONATATE 100 MG/1
100 CAPSULE ORAL 3 TIMES DAILY PRN
Status: DISCONTINUED | OUTPATIENT
Start: 2021-01-01 | End: 2021-01-01

## 2021-01-01 RX ORDER — SEVELAMER CARBONATE 800 MG/1
800 TABLET, FILM COATED ORAL
Qty: 90 TABLET | Refills: 11 | Status: SHIPPED | OUTPATIENT
Start: 2021-01-01 | End: 2022-02-10

## 2021-01-01 RX ORDER — SYRING-NEEDL,DISP,INSUL,0.3 ML 29 G X1/2"
296 SYRINGE, EMPTY DISPOSABLE MISCELLANEOUS
Status: DISCONTINUED | OUTPATIENT
Start: 2021-01-01 | End: 2021-01-01

## 2021-01-01 RX ORDER — SODIUM CHLORIDE 0.9 % (FLUSH) 0.9 %
5 SYRINGE (ML) INJECTION
Status: DISCONTINUED | OUTPATIENT
Start: 2021-01-01 | End: 2021-01-01 | Stop reason: HOSPADM

## 2021-01-01 RX ORDER — AMOXICILLIN 250 MG
6 CAPSULE ORAL DAILY
Status: DISCONTINUED | OUTPATIENT
Start: 2021-01-01 | End: 2021-01-01 | Stop reason: HOSPADM

## 2021-01-01 RX ORDER — AMLODIPINE BESYLATE 5 MG/1
5 TABLET ORAL DAILY
Qty: 30 TABLET | Refills: 11 | Status: SHIPPED | OUTPATIENT
Start: 2021-01-01 | End: 2022-07-23

## 2021-01-01 RX ORDER — HYDROCODONE BITARTRATE AND ACETAMINOPHEN 500; 5 MG/1; MG/1
TABLET ORAL ONCE
Status: CANCELLED | OUTPATIENT
Start: 2021-01-01 | End: 2021-01-01

## 2021-01-01 RX ORDER — DRONABINOL 2.5 MG/1
5 CAPSULE ORAL
Status: DISCONTINUED | OUTPATIENT
Start: 2021-01-01 | End: 2021-01-01 | Stop reason: HOSPADM

## 2021-01-01 RX ORDER — CEFTRIAXONE 1 G/1
1 INJECTION, POWDER, FOR SOLUTION INTRAMUSCULAR; INTRAVENOUS
Status: COMPLETED | OUTPATIENT
Start: 2021-01-01 | End: 2021-01-01

## 2021-01-01 RX ORDER — AMOXICILLIN 250 MG
1 CAPSULE ORAL 2 TIMES DAILY
Status: CANCELLED | OUTPATIENT
Start: 2021-01-01

## 2021-01-01 RX ORDER — CEFAZOLIN SODIUM 1 G/3ML
2 INJECTION, POWDER, FOR SOLUTION INTRAMUSCULAR; INTRAVENOUS
Status: CANCELLED | OUTPATIENT
Start: 2021-01-01

## 2021-01-01 RX ORDER — POTASSIUM CHLORIDE 7.45 MG/ML
40 INJECTION INTRAVENOUS
Status: DISCONTINUED | OUTPATIENT
Start: 2021-01-01 | End: 2021-01-01

## 2021-01-01 RX ORDER — FUROSEMIDE 20 MG/1
20 TABLET ORAL DAILY
Qty: 30 TABLET | Refills: 3 | Status: ON HOLD | OUTPATIENT
Start: 2021-01-01 | End: 2021-01-01 | Stop reason: SDUPTHER

## 2021-01-01 RX ORDER — GUAIFENESIN 100 MG/5ML
400 SOLUTION ORAL EVERY 8 HOURS
Status: DISCONTINUED | OUTPATIENT
Start: 2021-01-01 | End: 2021-01-01

## 2021-01-01 RX ORDER — BALSAM PERU/CASTOR OIL
OINTMENT (GRAM) TOPICAL 2 TIMES DAILY
Status: DISCONTINUED | OUTPATIENT
Start: 2021-01-01 | End: 2021-01-01

## 2021-01-01 RX ORDER — LORAZEPAM 0.5 MG/1
1 TABLET ORAL NIGHTLY PRN
Status: DISCONTINUED | OUTPATIENT
Start: 2021-01-01 | End: 2021-01-01

## 2021-01-01 RX ORDER — ONDANSETRON 4 MG/1
4 TABLET, ORALLY DISINTEGRATING ORAL EVERY 6 HOURS PRN
Status: ACTIVE | OUTPATIENT
Start: 2021-01-01 | End: 2021-01-01

## 2021-01-01 RX ORDER — AMOXICILLIN 250 MG
1 CAPSULE ORAL 2 TIMES DAILY PRN
Status: DISCONTINUED | OUTPATIENT
Start: 2021-01-01 | End: 2021-01-01 | Stop reason: HOSPADM

## 2021-01-01 RX ORDER — CALCITRIOL 0.5 UG/1
0.5 CAPSULE ORAL DAILY
Status: DISCONTINUED | OUTPATIENT
Start: 2021-01-01 | End: 2021-01-01

## 2021-01-01 RX ORDER — AMOXICILLIN 250 MG
1 CAPSULE ORAL 2 TIMES DAILY
Status: DISCONTINUED | OUTPATIENT
Start: 2021-01-01 | End: 2021-01-01 | Stop reason: HOSPADM

## 2021-01-01 RX ORDER — LACTULOSE 10 G/15ML
15 SOLUTION ORAL 3 TIMES DAILY
Status: DISCONTINUED | OUTPATIENT
Start: 2021-01-01 | End: 2021-01-01

## 2021-01-01 RX ORDER — METRONIDAZOLE 500 MG/100ML
500 INJECTION, SOLUTION INTRAVENOUS
Status: DISCONTINUED | OUTPATIENT
Start: 2021-01-01 | End: 2021-01-01

## 2021-01-01 RX ORDER — AMITRIPTYLINE HYDROCHLORIDE 25 MG/1
25 TABLET, FILM COATED ORAL NIGHTLY
Status: DISCONTINUED | OUTPATIENT
Start: 2021-01-01 | End: 2021-01-01

## 2021-01-01 RX ADMIN — APIXABAN 5 MG: 5 TABLET, FILM COATED ORAL at 09:05

## 2021-01-01 RX ADMIN — PANCRELIPASE 1 CAPSULE: 60000; 12000; 38000 CAPSULE, DELAYED RELEASE PELLETS ORAL at 05:03

## 2021-01-01 RX ADMIN — FUROSEMIDE 80 MG: 40 TABLET ORAL at 08:08

## 2021-01-01 RX ADMIN — DRONABINOL 5 MG: 2.5 CAPSULE ORAL at 03:01

## 2021-01-01 RX ADMIN — LEVETIRACETAM 500 MG: 500 TABLET ORAL at 09:01

## 2021-01-01 RX ADMIN — AMITRIPTYLINE HYDROCHLORIDE 50 MG: 50 TABLET, FILM COATED ORAL at 08:01

## 2021-01-01 RX ADMIN — FUROSEMIDE 40 MG: 40 TABLET ORAL at 09:08

## 2021-01-01 RX ADMIN — APIXABAN 5 MG: 5 TABLET, FILM COATED ORAL at 10:07

## 2021-01-01 RX ADMIN — LEVETIRACETAM 500 MG: 500 TABLET ORAL at 08:01

## 2021-01-01 RX ADMIN — CETIRIZINE HYDROCHLORIDE 5 MG: 5 TABLET ORAL at 12:08

## 2021-01-01 RX ADMIN — SENNOSIDES 8.6 MG: 8.6 TABLET, FILM COATED ORAL at 08:07

## 2021-01-01 RX ADMIN — AMITRIPTYLINE HYDROCHLORIDE 50 MG: 50 TABLET, FILM COATED ORAL at 09:03

## 2021-01-01 RX ADMIN — TAMSULOSIN HYDROCHLORIDE 0.4 MG: 0.4 CAPSULE ORAL at 09:01

## 2021-01-01 RX ADMIN — PANCRELIPASE 1 CAPSULE: 60000; 12000; 38000 CAPSULE, DELAYED RELEASE PELLETS ORAL at 04:08

## 2021-01-01 RX ADMIN — ONDANSETRON 4 MG: 2 INJECTION INTRAMUSCULAR; INTRAVENOUS at 09:04

## 2021-01-01 RX ADMIN — SODIUM CHLORIDE 500 ML: 0.9 INJECTION, SOLUTION INTRAVENOUS at 07:05

## 2021-01-01 RX ADMIN — LEVETIRACETAM 500 MG: 500 TABLET, FILM COATED ORAL at 09:08

## 2021-01-01 RX ADMIN — GUAIFENESIN 600 MG: 600 TABLET, EXTENDED RELEASE ORAL at 08:08

## 2021-01-01 RX ADMIN — PANCRELIPASE 1 CAPSULE: 60000; 12000; 38000 CAPSULE, DELAYED RELEASE PELLETS ORAL at 12:03

## 2021-01-01 RX ADMIN — LEVETIRACETAM 500 MG: 500 TABLET ORAL at 08:03

## 2021-01-01 RX ADMIN — AMLODIPINE BESYLATE 10 MG: 10 TABLET ORAL at 08:05

## 2021-01-01 RX ADMIN — AMLODIPINE BESYLATE 10 MG: 10 TABLET ORAL at 11:07

## 2021-01-01 RX ADMIN — APIXABAN 5 MG: 2.5 TABLET, FILM COATED ORAL at 08:01

## 2021-01-01 RX ADMIN — LEVETIRACETAM 500 MG: 500 TABLET ORAL at 09:05

## 2021-01-01 RX ADMIN — SEVELAMER CARBONATE 1600 MG: 800 TABLET, FILM COATED ORAL at 05:08

## 2021-01-01 RX ADMIN — CEFTRIAXONE 1 G: 1 INJECTION, POWDER, FOR SOLUTION INTRAMUSCULAR; INTRAVENOUS at 10:02

## 2021-01-01 RX ADMIN — SEVELAMER CARBONATE 800 MG: 800 TABLET, FILM COATED ORAL at 05:05

## 2021-01-01 RX ADMIN — POLYETHYLENE GLYCOL 3350 17 G: 17 POWDER, FOR SOLUTION ORAL at 09:08

## 2021-01-01 RX ADMIN — DOCUSATE SODIUM 50MG AND SENNOSIDES 8.6MG 6 TABLET: 8.6; 5 TABLET, FILM COATED ORAL at 09:05

## 2021-01-01 RX ADMIN — Medication 4 MG/HR: at 10:08

## 2021-01-01 RX ADMIN — SEVELAMER CARBONATE 800 MG: 800 TABLET, FILM COATED ORAL at 09:05

## 2021-01-01 RX ADMIN — SEVELAMER CARBONATE 1600 MG: 800 TABLET, FILM COATED ORAL at 11:07

## 2021-01-01 RX ADMIN — SEVELAMER CARBONATE 800 MG: 800 TABLET, FILM COATED ORAL at 05:03

## 2021-01-01 RX ADMIN — PANCRELIPASE 1 CAPSULE: 60000; 12000; 38000 CAPSULE, DELAYED RELEASE PELLETS ORAL at 04:03

## 2021-01-01 RX ADMIN — FUROSEMIDE 40 MG: 40 TABLET ORAL at 05:07

## 2021-01-01 RX ADMIN — DRONABINOL 5 MG: 2.5 CAPSULE ORAL at 09:01

## 2021-01-01 RX ADMIN — LORAZEPAM 1 MG: 1 TABLET ORAL at 10:05

## 2021-01-01 RX ADMIN — FUROSEMIDE 120 MG: 10 INJECTION, SOLUTION INTRAMUSCULAR; INTRAVENOUS at 10:03

## 2021-01-01 RX ADMIN — LACTULOSE 15 G: 10 SOLUTION ORAL at 09:05

## 2021-01-01 RX ADMIN — PANCRELIPASE 1 CAPSULE: 60000; 12000; 38000 CAPSULE, DELAYED RELEASE PELLETS ORAL at 12:05

## 2021-01-01 RX ADMIN — SEVELAMER CARBONATE 1600 MG: 800 TABLET, FILM COATED ORAL at 08:07

## 2021-01-01 RX ADMIN — PANCRELIPASE 1 CAPSULE: 60000; 12000; 38000 CAPSULE, DELAYED RELEASE PELLETS ORAL at 09:05

## 2021-01-01 RX ADMIN — LORAZEPAM 1 MG: 2 INJECTION INTRAMUSCULAR; INTRAVENOUS at 12:08

## 2021-01-01 RX ADMIN — SEVELAMER CARBONATE 1600 MG: 800 TABLET, FILM COATED ORAL at 08:08

## 2021-01-01 RX ADMIN — APIXABAN 5 MG: 5 TABLET, FILM COATED ORAL at 08:08

## 2021-01-01 RX ADMIN — POLYETHYLENE GLYCOL 3350 17 G: 17 POWDER, FOR SOLUTION ORAL at 06:05

## 2021-01-01 RX ADMIN — SEVELAMER CARBONATE 1600 MG: 800 TABLET, FILM COATED ORAL at 12:08

## 2021-01-01 RX ADMIN — PANCRELIPASE 1 CAPSULE: 60000; 12000; 38000 CAPSULE, DELAYED RELEASE PELLETS ORAL at 12:08

## 2021-01-01 RX ADMIN — APIXABAN 5 MG: 2.5 TABLET, FILM COATED ORAL at 08:03

## 2021-01-01 RX ADMIN — DOCUSATE SODIUM AND SENNOSIDES 1 TABLET: 8.6; 5 TABLET, FILM COATED ORAL at 09:05

## 2021-01-01 RX ADMIN — SODIUM CHLORIDE: 0.9 INJECTION, SOLUTION INTRAVENOUS at 01:04

## 2021-01-01 RX ADMIN — AMLODIPINE BESYLATE 10 MG: 10 TABLET ORAL at 09:05

## 2021-01-01 RX ADMIN — POLYETHYLENE GLYCOL 3350 17 G: 17 POWDER, FOR SOLUTION ORAL at 08:05

## 2021-01-01 RX ADMIN — PANCRELIPASE 1 CAPSULE: 60000; 12000; 38000 CAPSULE, DELAYED RELEASE PELLETS ORAL at 08:08

## 2021-01-01 RX ADMIN — SODIUM BICARBONATE 650 MG TABLET 1300 MG: at 08:01

## 2021-01-01 RX ADMIN — AMLODIPINE BESYLATE 5 MG: 5 TABLET ORAL at 10:08

## 2021-01-01 RX ADMIN — PANCRELIPASE 1 CAPSULE: 60000; 12000; 38000 CAPSULE, DELAYED RELEASE PELLETS ORAL at 05:08

## 2021-01-01 RX ADMIN — LORAZEPAM 1 MG: 1 TABLET ORAL at 10:07

## 2021-01-01 RX ADMIN — AMITRIPTYLINE HYDROCHLORIDE 50 MG: 50 TABLET, FILM COATED ORAL at 09:05

## 2021-01-01 RX ADMIN — LORAZEPAM 1 MG: 1 TABLET ORAL at 08:05

## 2021-01-01 RX ADMIN — SEVELAMER CARBONATE 800 MG: 800 TABLET, FILM COATED ORAL at 08:05

## 2021-01-01 RX ADMIN — PANCRELIPASE 1 CAPSULE: 60000; 12000; 38000 CAPSULE, DELAYED RELEASE PELLETS ORAL at 05:05

## 2021-01-01 RX ADMIN — BISACODYL 10 MG: 10 SUPPOSITORY RECTAL at 10:08

## 2021-01-01 RX ADMIN — SODIUM BICARBONATE 650 MG TABLET 1300 MG: at 08:03

## 2021-01-01 RX ADMIN — MAGNESIUM CITRATE 296 ML: 1.75 LIQUID ORAL at 11:05

## 2021-01-01 RX ADMIN — AMITRIPTYLINE HYDROCHLORIDE 50 MG: 50 TABLET, FILM COATED ORAL at 08:05

## 2021-01-01 RX ADMIN — POLYETHYLENE GLYCOL 3350 17 G: 17 POWDER, FOR SOLUTION ORAL at 09:05

## 2021-01-01 RX ADMIN — MAGNESIUM SULFATE 2 G: 2 INJECTION INTRAVENOUS at 09:01

## 2021-01-01 RX ADMIN — Medication 3 MG/HR: at 11:08

## 2021-01-01 RX ADMIN — DIATRIZOATE MEGLUMINE AND DIATRIZOATE SODIUM 300 ML: 660; 100 LIQUID ORAL; RECTAL at 12:05

## 2021-01-01 RX ADMIN — SEVELAMER CARBONATE 1600 MG: 800 TABLET, FILM COATED ORAL at 04:08

## 2021-01-01 RX ADMIN — APIXABAN 5 MG: 5 TABLET, FILM COATED ORAL at 10:05

## 2021-01-01 RX ADMIN — THERA TABS 1 TABLET: TAB at 09:01

## 2021-01-01 RX ADMIN — SEVELAMER CARBONATE 1600 MG: 800 TABLET, FILM COATED ORAL at 12:05

## 2021-01-01 RX ADMIN — PANCRELIPASE 1 CAPSULE: 60000; 12000; 38000 CAPSULE, DELAYED RELEASE PELLETS ORAL at 12:01

## 2021-01-01 RX ADMIN — LEVETIRACETAM 500 MG: 500 TABLET ORAL at 08:05

## 2021-01-01 RX ADMIN — CALCITRIOL 0.5 MCG: 0.5 CAPSULE, LIQUID FILLED ORAL at 08:05

## 2021-01-01 RX ADMIN — CALCIUM 1000 MG: 500 TABLET ORAL at 08:01

## 2021-01-01 RX ADMIN — PANCRELIPASE 1 CAPSULE: 60000; 12000; 38000 CAPSULE, DELAYED RELEASE PELLETS ORAL at 08:05

## 2021-01-01 RX ADMIN — TAMSULOSIN HYDROCHLORIDE 0.4 MG: 0.4 CAPSULE ORAL at 09:07

## 2021-01-01 RX ADMIN — LEVETIRACETAM 500 MG: 500 TABLET ORAL at 08:07

## 2021-01-01 RX ADMIN — LORAZEPAM 1 MG: 1 TABLET ORAL at 09:05

## 2021-01-01 RX ADMIN — LORAZEPAM 1 MG: 1 TABLET ORAL at 08:04

## 2021-01-01 RX ADMIN — CETIRIZINE HYDROCHLORIDE 5 MG: 5 TABLET ORAL at 01:08

## 2021-01-01 RX ADMIN — FUROSEMIDE 40 MG: 40 TABLET ORAL at 05:05

## 2021-01-01 RX ADMIN — SEVELAMER CARBONATE 1600 MG: 800 TABLET, FILM COATED ORAL at 11:05

## 2021-01-01 RX ADMIN — LEVETIRACETAM 500 MG: 5 INJECTION INTRAVENOUS at 10:08

## 2021-01-01 RX ADMIN — DEXAMETHASONE SODIUM PHOSPHATE 4 MG: 4 INJECTION INTRA-ARTICULAR; INTRALESIONAL; INTRAMUSCULAR; INTRAVENOUS; SOFT TISSUE at 09:04

## 2021-01-01 RX ADMIN — APIXABAN 5 MG: 2.5 TABLET, FILM COATED ORAL at 09:01

## 2021-01-01 RX ADMIN — LEVETIRACETAM 500 MG: 500 TABLET ORAL at 10:04

## 2021-01-01 RX ADMIN — AMLODIPINE BESYLATE 5 MG: 5 TABLET ORAL at 08:08

## 2021-01-01 RX ADMIN — PANCRELIPASE 1 CAPSULE: 60000; 12000; 38000 CAPSULE, DELAYED RELEASE PELLETS ORAL at 11:03

## 2021-01-01 RX ADMIN — DOCUSATE SODIUM 50MG AND SENNOSIDES 8.6MG 1 TABLET: 8.6; 5 TABLET, FILM COATED ORAL at 09:08

## 2021-01-01 RX ADMIN — AMITRIPTYLINE HYDROCHLORIDE 25 MG: 25 TABLET, FILM COATED ORAL at 10:08

## 2021-01-01 RX ADMIN — LIDOCAINE HYDROCHLORIDE 80 MG: 20 INJECTION, SOLUTION EPIDURAL; INFILTRATION; INTRACAUDAL at 09:04

## 2021-01-01 RX ADMIN — LEVETIRACETAM 500 MG: 500 TABLET, FILM COATED ORAL at 08:08

## 2021-01-01 RX ADMIN — AMITRIPTYLINE HYDROCHLORIDE 50 MG: 50 TABLET, FILM COATED ORAL at 08:03

## 2021-01-01 RX ADMIN — Medication: at 08:08

## 2021-01-01 RX ADMIN — LEVETIRACETAM 500 MG: 500 TABLET ORAL at 10:05

## 2021-01-01 RX ADMIN — METRONIDAZOLE 500 MG: 500 INJECTION, SOLUTION INTRAVENOUS at 08:08

## 2021-01-01 RX ADMIN — PANCRELIPASE 1 CAPSULE: 60000; 12000; 38000 CAPSULE, DELAYED RELEASE PELLETS ORAL at 11:05

## 2021-01-01 RX ADMIN — TAMSULOSIN HYDROCHLORIDE 0.4 MG: 0.4 CAPSULE ORAL at 09:08

## 2021-01-01 RX ADMIN — DRONABINOL 5 MG: 2.5 CAPSULE ORAL at 04:01

## 2021-01-01 RX ADMIN — LORAZEPAM 1 MG: 2 INJECTION INTRAMUSCULAR; INTRAVENOUS at 11:08

## 2021-01-01 RX ADMIN — LORAZEPAM 1 MG: 1 TABLET ORAL at 10:08

## 2021-01-01 RX ADMIN — DOCUSATE SODIUM AND SENNOSIDES 1 TABLET: 8.6; 5 TABLET, FILM COATED ORAL at 08:05

## 2021-01-01 RX ADMIN — APIXABAN 5 MG: 2.5 TABLET, FILM COATED ORAL at 09:03

## 2021-01-01 RX ADMIN — TAMSULOSIN HYDROCHLORIDE 0.4 MG: 0.4 CAPSULE ORAL at 08:08

## 2021-01-01 RX ADMIN — FENTANYL CITRATE 12.5 MCG: 50 INJECTION INTRAMUSCULAR; INTRAVENOUS at 11:08

## 2021-01-01 RX ADMIN — LEVETIRACETAM 500 MG: 500 TABLET ORAL at 08:04

## 2021-01-01 RX ADMIN — FUROSEMIDE 40 MG: 40 TABLET ORAL at 12:08

## 2021-01-01 RX ADMIN — APIXABAN 5 MG: 5 TABLET, FILM COATED ORAL at 08:05

## 2021-01-01 RX ADMIN — METRONIDAZOLE 500 MG: 500 INJECTION, SOLUTION INTRAVENOUS at 03:08

## 2021-01-01 RX ADMIN — AMLODIPINE BESYLATE 5 MG: 5 TABLET ORAL at 08:03

## 2021-01-01 RX ADMIN — HEPARIN SODIUM 5000 UNITS: 5000 INJECTION INTRAVENOUS; SUBCUTANEOUS at 09:08

## 2021-01-01 RX ADMIN — AMITRIPTYLINE HYDROCHLORIDE 50 MG: 50 TABLET, FILM COATED ORAL at 09:01

## 2021-01-01 RX ADMIN — DRONABINOL 5 MG: 2.5 CAPSULE ORAL at 06:01

## 2021-01-01 RX ADMIN — AMLODIPINE BESYLATE 5 MG: 5 TABLET ORAL at 09:08

## 2021-01-01 RX ADMIN — FUROSEMIDE 60 MG: 10 INJECTION, SOLUTION INTRAMUSCULAR; INTRAVENOUS at 02:03

## 2021-01-01 RX ADMIN — Medication 2 SPRAY: at 09:08

## 2021-01-01 RX ADMIN — AMITRIPTYLINE HYDROCHLORIDE 10 MG: 10 TABLET, FILM COATED ORAL at 08:08

## 2021-01-01 RX ADMIN — LACTULOSE 15 G: 10 SOLUTION ORAL at 04:05

## 2021-01-01 RX ADMIN — GUAIFENESIN 400 MG: 200 SOLUTION ORAL at 02:08

## 2021-01-01 RX ADMIN — AMITRIPTYLINE HYDROCHLORIDE 50 MG: 50 TABLET, FILM COATED ORAL at 10:05

## 2021-01-01 RX ADMIN — SEVELAMER CARBONATE 800 MG: 800 TABLET, FILM COATED ORAL at 11:05

## 2021-01-01 RX ADMIN — TORSEMIDE 20 MG: 20 TABLET ORAL at 11:01

## 2021-01-01 RX ADMIN — CALCIUM 1000 MG: 500 TABLET ORAL at 09:01

## 2021-01-01 RX ADMIN — DRONABINOL 5 MG: 2.5 CAPSULE ORAL at 05:01

## 2021-01-01 RX ADMIN — ACETAMINOPHEN 650 MG: 325 TABLET ORAL at 11:05

## 2021-01-01 RX ADMIN — SEVELAMER CARBONATE 1600 MG: 800 TABLET, FILM COATED ORAL at 09:05

## 2021-01-01 RX ADMIN — APIXABAN 5 MG: 5 TABLET, FILM COATED ORAL at 09:08

## 2021-01-01 RX ADMIN — GUAIFENESIN 600 MG: 600 TABLET, EXTENDED RELEASE ORAL at 02:08

## 2021-01-01 RX ADMIN — PANCRELIPASE 1 CAPSULE: 60000; 12000; 38000 CAPSULE, DELAYED RELEASE PELLETS ORAL at 07:08

## 2021-01-01 RX ADMIN — DOCUSATE SODIUM 50MG AND SENNOSIDES 8.6MG 1 TABLET: 8.6; 5 TABLET, FILM COATED ORAL at 08:08

## 2021-01-01 RX ADMIN — SEVELAMER CARBONATE 1600 MG: 800 TABLET, FILM COATED ORAL at 06:05

## 2021-01-01 RX ADMIN — MELATONIN TAB 3 MG 6 MG: 3 TAB at 08:08

## 2021-01-01 RX ADMIN — CEFTRIAXONE 1 G: 1 INJECTION, SOLUTION INTRAVENOUS at 12:06

## 2021-01-01 RX ADMIN — SCOPALAMINE 1 PATCH: 1 PATCH, EXTENDED RELEASE TRANSDERMAL at 11:08

## 2021-01-01 RX ADMIN — SEVELAMER CARBONATE 1600 MG: 800 TABLET, FILM COATED ORAL at 04:05

## 2021-01-01 RX ADMIN — PANCRELIPASE 1 CAPSULE: 60000; 12000; 38000 CAPSULE, DELAYED RELEASE PELLETS ORAL at 08:07

## 2021-01-01 RX ADMIN — SODIUM CHLORIDE: 0.9 INJECTION, SOLUTION INTRAVENOUS at 08:04

## 2021-01-01 RX ADMIN — TAMSULOSIN HYDROCHLORIDE 0.4 MG: 0.4 CAPSULE ORAL at 08:03

## 2021-01-01 RX ADMIN — BUMETANIDE 2 MG: 1 TABLET ORAL at 08:08

## 2021-01-01 RX ADMIN — LACTULOSE 30 G: 10 SOLUTION ORAL at 08:05

## 2021-01-01 RX ADMIN — IRON SUCROSE 100 MG: 20 INJECTION, SOLUTION INTRAVENOUS at 02:04

## 2021-01-01 RX ADMIN — SEVELAMER CARBONATE 1600 MG: 800 TABLET, FILM COATED ORAL at 05:05

## 2021-01-01 RX ADMIN — SODIUM BICARBONATE: 84 INJECTION, SOLUTION INTRAVENOUS at 06:01

## 2021-01-01 RX ADMIN — GUAIFENESIN 600 MG: 600 TABLET, EXTENDED RELEASE ORAL at 09:08

## 2021-01-01 RX ADMIN — GUAIFENESIN 400 MG: 200 SOLUTION ORAL at 09:08

## 2021-01-01 RX ADMIN — PANCRELIPASE 1 CAPSULE: 60000; 12000; 38000 CAPSULE, DELAYED RELEASE PELLETS ORAL at 07:05

## 2021-01-01 RX ADMIN — ATORVASTATIN CALCIUM 40 MG: 20 TABLET, FILM COATED ORAL at 09:08

## 2021-01-01 RX ADMIN — LACTULOSE 30 G: 10 SOLUTION ORAL at 05:05

## 2021-01-01 RX ADMIN — SEVELAMER CARBONATE 800 MG: 800 TABLET, FILM COATED ORAL at 12:01

## 2021-01-01 RX ADMIN — FUROSEMIDE 40 MG: 40 TABLET ORAL at 08:05

## 2021-01-01 RX ADMIN — PANCRELIPASE 1 CAPSULE: 60000; 12000; 38000 CAPSULE, DELAYED RELEASE PELLETS ORAL at 07:03

## 2021-01-01 RX ADMIN — TAMSULOSIN HYDROCHLORIDE 0.4 MG: 0.4 CAPSULE ORAL at 08:01

## 2021-01-01 RX ADMIN — SODIUM CHLORIDE, SODIUM LACTATE, POTASSIUM CHLORIDE, AND CALCIUM CHLORIDE: .6; .31; .03; .02 INJECTION, SOLUTION INTRAVENOUS at 09:05

## 2021-01-01 RX ADMIN — FUROSEMIDE 80 MG: 10 INJECTION, SOLUTION INTRAMUSCULAR; INTRAVENOUS at 09:03

## 2021-01-01 RX ADMIN — RACEPINEPHRINE HYDROCHLORIDE 0.5 ML: 11.25 SOLUTION RESPIRATORY (INHALATION) at 11:08

## 2021-01-01 RX ADMIN — APIXABAN 5 MG: 2.5 TABLET, FILM COATED ORAL at 10:03

## 2021-01-01 RX ADMIN — MUPIROCIN: 20 OINTMENT TOPICAL at 09:08

## 2021-01-01 RX ADMIN — SEVELAMER CARBONATE 800 MG: 800 TABLET, FILM COATED ORAL at 04:01

## 2021-01-01 RX ADMIN — INSULIN ASPART 2 UNITS: 100 INJECTION, SOLUTION INTRAVENOUS; SUBCUTANEOUS at 11:08

## 2021-01-01 RX ADMIN — SEVELAMER CARBONATE 800 MG: 800 TABLET, FILM COATED ORAL at 12:05

## 2021-01-01 RX ADMIN — LACTULOSE 30 G: 10 SOLUTION ORAL at 09:05

## 2021-01-01 RX ADMIN — POLYETHYLENE GLYCOL 3350 17 G: 17 POWDER, FOR SOLUTION ORAL at 10:08

## 2021-01-01 RX ADMIN — LEVETIRACETAM 500 MG: 5 INJECTION INTRAVENOUS at 09:08

## 2021-01-01 RX ADMIN — LORAZEPAM 1 MG: 2 INJECTION INTRAMUSCULAR; INTRAVENOUS at 10:08

## 2021-01-01 RX ADMIN — Medication 2 MG/HR: at 06:08

## 2021-01-01 RX ADMIN — FUROSEMIDE 40 MG: 40 TABLET ORAL at 10:07

## 2021-01-01 RX ADMIN — DOCUSATE SODIUM 50MG AND SENNOSIDES 8.6MG 6 TABLET: 8.6; 5 TABLET, FILM COATED ORAL at 10:05

## 2021-01-01 RX ADMIN — SODIUM BICARBONATE 650 MG TABLET 1300 MG: at 09:03

## 2021-01-01 RX ADMIN — PANCRELIPASE 1 CAPSULE: 60000; 12000; 38000 CAPSULE, DELAYED RELEASE PELLETS ORAL at 03:08

## 2021-01-01 RX ADMIN — CLONIDINE HYDROCHLORIDE 0.1 MG: 0.1 TABLET ORAL at 09:05

## 2021-01-01 RX ADMIN — LEVETIRACETAM 500 MG: 500 TABLET ORAL at 09:03

## 2021-01-01 RX ADMIN — FUROSEMIDE 40 MG: 40 TABLET ORAL at 08:07

## 2021-01-01 RX ADMIN — POLYETHYLENE GLYCOL 3350 17 G: 17 POWDER, FOR SOLUTION ORAL at 08:07

## 2021-01-01 RX ADMIN — SEVELAMER CARBONATE 800 MG: 800 TABLET, FILM COATED ORAL at 08:01

## 2021-01-01 RX ADMIN — POLYETHYLENE GLYCOL 3350 17 G: 17 POWDER, FOR SOLUTION ORAL at 10:05

## 2021-01-01 RX ADMIN — SODIUM CHLORIDE: 0.9 INJECTION, SOLUTION INTRAVENOUS at 08:05

## 2021-01-01 RX ADMIN — DOCUSATE SODIUM 50MG AND SENNOSIDES 8.6MG 1 TABLET: 8.6; 5 TABLET, FILM COATED ORAL at 08:01

## 2021-01-01 RX ADMIN — SODIUM BICARBONATE 650 MG TABLET 1300 MG: at 02:03

## 2021-01-01 RX ADMIN — LEVETIRACETAM 500 MG: 500 TABLET ORAL at 10:03

## 2021-01-01 RX ADMIN — AMITRIPTYLINE HYDROCHLORIDE 50 MG: 50 TABLET, FILM COATED ORAL at 09:08

## 2021-01-01 RX ADMIN — SEVELAMER CARBONATE 800 MG: 800 TABLET, FILM COATED ORAL at 04:03

## 2021-01-01 RX ADMIN — TAMSULOSIN HYDROCHLORIDE 0.4 MG: 0.4 CAPSULE ORAL at 10:08

## 2021-01-01 RX ADMIN — POLYETHYLENE GLYCOL 3350 17 G: 17 POWDER, FOR SOLUTION ORAL at 08:08

## 2021-01-01 RX ADMIN — POLYETHYLENE GLYCOL 3350 17 G: 17 POWDER, FOR SOLUTION ORAL at 03:05

## 2021-01-01 RX ADMIN — SEVELAMER CARBONATE 2.4 G: 2.4 POWDER, FOR SUSPENSION ORAL at 09:08

## 2021-01-01 RX ADMIN — LACTULOSE 15 G: 10 SOLUTION ORAL at 10:05

## 2021-01-01 RX ADMIN — SODIUM ZIRCONIUM CYCLOSILICATE 10 G: 10 POWDER, FOR SUSPENSION ORAL at 08:07

## 2021-01-01 RX ADMIN — SEVELAMER CARBONATE 800 MG: 800 TABLET, FILM COATED ORAL at 05:01

## 2021-01-01 RX ADMIN — APIXABAN 5 MG: 5 TABLET, FILM COATED ORAL at 08:07

## 2021-01-01 RX ADMIN — PANCRELIPASE 1 CAPSULE: 60000; 12000; 38000 CAPSULE, DELAYED RELEASE PELLETS ORAL at 04:07

## 2021-01-01 RX ADMIN — HYDROCORTISONE SODIUM SUCCINATE 200 MG: 100 INJECTION, POWDER, FOR SOLUTION INTRAMUSCULAR; INTRAVENOUS at 04:08

## 2021-01-01 RX ADMIN — INSULIN ASPART 2 UNITS: 100 INJECTION, SOLUTION INTRAVENOUS; SUBCUTANEOUS at 05:08

## 2021-01-01 RX ADMIN — INSULIN ASPART 2 UNITS: 100 INJECTION, SOLUTION INTRAVENOUS; SUBCUTANEOUS at 03:08

## 2021-01-01 RX ADMIN — PANCRELIPASE 1 CAPSULE: 60000; 12000; 38000 CAPSULE, DELAYED RELEASE PELLETS ORAL at 11:08

## 2021-01-01 RX ADMIN — CETIRIZINE HYDROCHLORIDE 5 MG: 5 TABLET ORAL at 10:08

## 2021-01-01 RX ADMIN — SEVELAMER CARBONATE 1600 MG: 800 TABLET, FILM COATED ORAL at 07:08

## 2021-01-01 RX ADMIN — INSULIN ASPART 1 UNITS: 100 INJECTION, SOLUTION INTRAVENOUS; SUBCUTANEOUS at 11:08

## 2021-01-01 RX ADMIN — LEVETIRACETAM 500 MG: 5 INJECTION INTRAVENOUS at 02:08

## 2021-01-01 RX ADMIN — AMLODIPINE BESYLATE 10 MG: 10 TABLET ORAL at 08:07

## 2021-01-01 RX ADMIN — SEVELAMER CARBONATE 800 MG: 800 TABLET, FILM COATED ORAL at 01:04

## 2021-01-01 RX ADMIN — PANCRELIPASE 1 CAPSULE: 60000; 12000; 38000 CAPSULE, DELAYED RELEASE PELLETS ORAL at 08:01

## 2021-01-01 RX ADMIN — Medication: at 09:08

## 2021-01-01 RX ADMIN — DIPHENHYDRAMINE HYDROCHLORIDE 50 MG: 50 CAPSULE ORAL at 11:05

## 2021-01-01 RX ADMIN — PANCRELIPASE 1 CAPSULE: 60000; 12000; 38000 CAPSULE, DELAYED RELEASE PELLETS ORAL at 11:07

## 2021-01-01 RX ADMIN — CETIRIZINE HYDROCHLORIDE 5 MG: 5 TABLET ORAL at 08:08

## 2021-01-01 RX ADMIN — THERA TABS 1 TABLET: TAB at 08:01

## 2021-01-01 RX ADMIN — SEVELAMER CARBONATE 800 MG: 800 TABLET, FILM COATED ORAL at 07:03

## 2021-01-01 RX ADMIN — PANCRELIPASE 1 CAPSULE: 60000; 12000; 38000 CAPSULE, DELAYED RELEASE PELLETS ORAL at 01:01

## 2021-01-01 RX ADMIN — SODIUM CHLORIDE: 0.9 INJECTION, SOLUTION INTRAVENOUS at 09:03

## 2021-01-01 RX ADMIN — MUPIROCIN: 20 OINTMENT TOPICAL at 08:08

## 2021-01-01 RX ADMIN — PROPOFOL 140 MG: 10 INJECTION, EMULSION INTRAVENOUS at 09:04

## 2021-01-01 RX ADMIN — SODIUM BICARBONATE 650 MG TABLET 1300 MG: at 09:01

## 2021-01-01 RX ADMIN — SEVELAMER CARBONATE 1600 MG: 800 TABLET, FILM COATED ORAL at 09:08

## 2021-01-01 RX ADMIN — PANCRELIPASE 1 CAPSULE: 60000; 12000; 38000 CAPSULE, DELAYED RELEASE PELLETS ORAL at 09:01

## 2021-01-01 RX ADMIN — PANCRELIPASE 1 CAPSULE: 60000; 12000; 38000 CAPSULE, DELAYED RELEASE PELLETS ORAL at 12:07

## 2021-01-01 RX ADMIN — MELATONIN TAB 3 MG 6 MG: 3 TAB at 09:08

## 2021-01-01 RX ADMIN — AMITRIPTYLINE HYDROCHLORIDE 10 MG: 10 TABLET, FILM COATED ORAL at 09:08

## 2021-01-01 RX ADMIN — PANCRELIPASE 1 CAPSULE: 60000; 12000; 38000 CAPSULE, DELAYED RELEASE PELLETS ORAL at 09:03

## 2021-01-01 RX ADMIN — IRON SUCROSE 100 MG: 20 INJECTION, SOLUTION INTRAVENOUS at 01:04

## 2021-01-01 RX ADMIN — PANCRELIPASE 1 CAPSULE: 60000; 12000; 38000 CAPSULE, DELAYED RELEASE PELLETS ORAL at 05:01

## 2021-01-01 RX ADMIN — Medication 2 SPRAY: at 08:08

## 2021-01-01 RX ADMIN — ACETAMINOPHEN 650 MG: 325 TABLET ORAL at 12:05

## 2021-01-01 RX ADMIN — BUMETANIDE 2 MG: 1 TABLET ORAL at 09:08

## 2021-01-01 RX ADMIN — AMITRIPTYLINE HYDROCHLORIDE 25 MG: 25 TABLET, FILM COATED ORAL at 08:08

## 2021-01-01 RX ADMIN — POLYETHYLENE GLYCOL 3350, SODIUM SULFATE, SODIUM CHLORIDE, POTASSIUM CHLORIDE, ASCORBIC ACID, SODIUM ASCORBATE 2000 ML: KIT at 12:05

## 2021-01-01 RX ADMIN — SEVELAMER CARBONATE 800 MG: 800 TABLET, FILM COATED ORAL at 11:01

## 2021-01-01 RX ADMIN — FUROSEMIDE 40 MG: 10 INJECTION, SOLUTION INTRAMUSCULAR; INTRAVENOUS at 02:01

## 2021-01-01 RX ADMIN — TAMSULOSIN HYDROCHLORIDE 0.4 MG: 0.4 CAPSULE ORAL at 09:03

## 2021-01-01 RX ADMIN — DOCUSATE SODIUM 50MG AND SENNOSIDES 8.6MG 1 TABLET: 8.6; 5 TABLET, FILM COATED ORAL at 10:08

## 2021-01-01 RX ADMIN — OXYBUTYNIN CHLORIDE 10 MG: 10 TABLET, EXTENDED RELEASE ORAL at 08:01

## 2021-01-01 RX ADMIN — IPRATROPIUM BROMIDE AND ALBUTEROL SULFATE 3 ML: .5; 2.5 SOLUTION RESPIRATORY (INHALATION) at 11:08

## 2021-01-01 RX ADMIN — Medication 100 ML: at 03:05

## 2021-01-01 RX ADMIN — LORAZEPAM 1 MG: 1 TABLET ORAL at 12:04

## 2021-01-01 RX ADMIN — SEVELAMER CARBONATE 1600 MG: 800 TABLET, FILM COATED ORAL at 08:05

## 2021-01-01 RX ADMIN — VANCOMYCIN HYDROCHLORIDE 2000 MG: 10 INJECTION, POWDER, LYOPHILIZED, FOR SOLUTION INTRAVENOUS at 09:08

## 2021-01-01 RX ADMIN — LORAZEPAM 1 MG: 2 INJECTION INTRAMUSCULAR; INTRAVENOUS at 03:08

## 2021-01-01 RX ADMIN — SEVELAMER CARBONATE 1600 MG: 800 TABLET, FILM COATED ORAL at 04:07

## 2021-01-01 RX ADMIN — SEVELAMER CARBONATE 800 MG: 800 TABLET, FILM COATED ORAL at 05:04

## 2021-01-01 RX ADMIN — LORAZEPAM 0.5 MG: 0.5 TABLET ORAL at 05:03

## 2021-01-01 RX ADMIN — SODIUM CHLORIDE, SODIUM LACTATE, POTASSIUM CHLORIDE, AND CALCIUM CHLORIDE 1000 ML: .6; .31; .03; .02 INJECTION, SOLUTION INTRAVENOUS at 02:04

## 2021-01-01 RX ADMIN — SODIUM CHLORIDE 125 ML/HR: 0.9 INJECTION, SOLUTION INTRAVENOUS at 02:01

## 2021-01-01 RX ADMIN — IRON SUCROSE 100 MG: 20 INJECTION, SOLUTION INTRAVENOUS at 10:03

## 2021-01-01 RX ADMIN — PANCRELIPASE 1 CAPSULE: 60000; 12000; 38000 CAPSULE, DELAYED RELEASE PELLETS ORAL at 04:05

## 2021-01-01 RX ADMIN — POTASSIUM BICARBONATE 20 MEQ: 391 TABLET, EFFERVESCENT ORAL at 10:01

## 2021-01-01 RX ADMIN — SEVELAMER CARBONATE 800 MG: 800 TABLET, FILM COATED ORAL at 04:07

## 2021-01-01 RX ADMIN — FENTANYL CITRATE 100 MCG: 50 INJECTION INTRAMUSCULAR; INTRAVENOUS at 09:04

## 2021-01-01 RX ADMIN — LORAZEPAM 1 MG: 2 INJECTION INTRAMUSCULAR; INTRAVENOUS at 09:08

## 2021-01-01 RX ADMIN — FUROSEMIDE 40 MG: 10 INJECTION, SOLUTION INTRAMUSCULAR; INTRAVENOUS at 12:03

## 2021-01-01 RX ADMIN — FUROSEMIDE 120 MG: 10 INJECTION, SOLUTION INTRAMUSCULAR; INTRAVENOUS at 08:03

## 2021-01-01 RX ADMIN — IPRATROPIUM BROMIDE AND ALBUTEROL SULFATE 3 ML: .5; 2.5 SOLUTION RESPIRATORY (INHALATION) at 10:08

## 2021-01-01 RX ADMIN — HEPARIN SODIUM 5000 UNITS: 5000 INJECTION INTRAVENOUS; SUBCUTANEOUS at 05:08

## 2021-01-01 RX ADMIN — CETIRIZINE HYDROCHLORIDE 5 MG: 5 TABLET ORAL at 09:08

## 2021-01-01 RX ADMIN — SEVELAMER CARBONATE 800 MG: 800 TABLET, FILM COATED ORAL at 11:03

## 2021-01-01 RX ADMIN — LACTULOSE 15 G: 10 SOLUTION ORAL at 08:05

## 2021-01-01 RX ADMIN — LEVETIRACETAM 500 MG: 5 INJECTION INTRAVENOUS at 08:08

## 2021-01-01 RX ADMIN — Medication 1 MG/HR: at 12:08

## 2021-01-01 RX ADMIN — DOCUSATE SODIUM AND SENNOSIDES 1 TABLET: 8.6; 5 TABLET, FILM COATED ORAL at 08:04

## 2021-01-01 RX ADMIN — SEVELAMER CARBONATE 1600 MG: 800 TABLET, FILM COATED ORAL at 11:08

## 2021-01-01 RX ADMIN — AMITRIPTYLINE HYDROCHLORIDE 50 MG: 50 TABLET, FILM COATED ORAL at 08:04

## 2021-01-01 RX ADMIN — MUPIROCIN: 20 OINTMENT TOPICAL at 10:08

## 2021-01-01 RX ADMIN — LACTULOSE 200 G: 10 SOLUTION ORAL at 09:05

## 2021-01-01 RX ADMIN — DOCUSATE SODIUM AND SENNOSIDES 1 TABLET: 8.6; 5 TABLET, FILM COATED ORAL at 10:05

## 2021-01-01 RX ADMIN — FUROSEMIDE 80 MG: 40 TABLET ORAL at 09:08

## 2021-01-01 RX ADMIN — TAMSULOSIN HYDROCHLORIDE 0.4 MG: 0.4 CAPSULE ORAL at 10:03

## 2021-01-01 RX ADMIN — AMITRIPTYLINE HYDROCHLORIDE 10 MG: 10 TABLET, FILM COATED ORAL at 01:08

## 2021-01-01 RX ADMIN — PANCRELIPASE 1 CAPSULE: 60000; 12000; 38000 CAPSULE, DELAYED RELEASE PELLETS ORAL at 01:04

## 2021-01-01 RX ADMIN — POTASSIUM CHLORIDE 30 MEQ: 10 CAPSULE, COATED, EXTENDED RELEASE ORAL at 02:08

## 2021-01-01 RX ADMIN — DOCUSATE SODIUM 50MG AND SENNOSIDES 8.6MG 6 TABLET: 8.6; 5 TABLET, FILM COATED ORAL at 04:05

## 2021-01-01 RX ADMIN — PANCRELIPASE 1 CAPSULE: 60000; 12000; 38000 CAPSULE, DELAYED RELEASE PELLETS ORAL at 11:01

## 2021-01-01 RX ADMIN — SODIUM BICARBONATE 650 MG TABLET 1300 MG: at 04:03

## 2021-01-01 RX ADMIN — LEVETIRACETAM 500 MG: 500 TABLET, FILM COATED ORAL at 12:08

## 2021-01-01 RX ADMIN — POTASSIUM CHLORIDE 30 MEQ: 10 CAPSULE, COATED, EXTENDED RELEASE ORAL at 10:03

## 2021-01-01 RX ADMIN — FUROSEMIDE 120 MG: 10 INJECTION, SOLUTION INTRAMUSCULAR; INTRAVENOUS at 07:03

## 2021-01-01 RX ADMIN — SODIUM BICARBONATE: 84 INJECTION, SOLUTION INTRAVENOUS at 02:01

## 2021-01-01 RX ADMIN — LORAZEPAM 1 MG: 1 TABLET ORAL at 09:08

## 2021-01-01 RX ADMIN — AMLODIPINE BESYLATE 5 MG: 5 TABLET ORAL at 09:03

## 2021-01-01 RX ADMIN — LACTULOSE 15 G: 10 SOLUTION ORAL at 02:05

## 2021-01-01 RX ADMIN — SEVELAMER CARBONATE 800 MG: 800 TABLET, FILM COATED ORAL at 08:04

## 2021-01-01 RX ADMIN — VANCOMYCIN HYDROCHLORIDE 1500 MG: 1.5 INJECTION, POWDER, LYOPHILIZED, FOR SOLUTION INTRAVENOUS at 03:08

## 2021-01-01 RX ADMIN — PANCRELIPASE 1 CAPSULE: 60000; 12000; 38000 CAPSULE, DELAYED RELEASE PELLETS ORAL at 10:05

## 2021-01-01 RX ADMIN — APIXABAN 5 MG: 5 TABLET, FILM COATED ORAL at 10:08

## 2021-01-01 RX ADMIN — INSULIN ASPART 1 UNITS: 100 INJECTION, SOLUTION INTRAVENOUS; SUBCUTANEOUS at 07:08

## 2021-01-01 RX ADMIN — PAMIDRONATE DISODIUM 30 MG: 3 INJECTION, SOLUTION INTRAVENOUS at 04:05

## 2021-01-01 RX ADMIN — MELATONIN TAB 3 MG 6 MG: 3 TAB at 10:08

## 2021-01-01 RX ADMIN — DIPHENHYDRAMINE HYDROCHLORIDE 25 MG: 50 INJECTION INTRAMUSCULAR; INTRAVENOUS at 04:08

## 2021-01-01 RX ADMIN — DOCUSATE SODIUM 50MG AND SENNOSIDES 8.6MG 1 TABLET: 8.6; 5 TABLET, FILM COATED ORAL at 11:01

## 2021-01-01 RX ADMIN — LACTULOSE 15 G: 10 SOLUTION ORAL at 12:05

## 2021-01-01 RX ADMIN — FLUTICASONE PROPIONATE 100 MCG: 50 SPRAY, METERED NASAL at 09:08

## 2021-01-01 RX ADMIN — IOHEXOL 60 ML: 300 INJECTION, SOLUTION INTRAVENOUS at 06:08

## 2021-01-01 RX ADMIN — AMLODIPINE BESYLATE 5 MG: 5 TABLET ORAL at 09:01

## 2021-01-01 RX ADMIN — LORAZEPAM 1 MG: 1 TABLET ORAL at 08:07

## 2021-01-01 RX ADMIN — AMLODIPINE BESYLATE 5 MG: 5 TABLET ORAL at 08:01

## 2021-01-01 RX ADMIN — CEFAZOLIN 3 G: 330 INJECTION, POWDER, FOR SOLUTION INTRAMUSCULAR; INTRAVENOUS at 09:04

## 2021-01-01 RX ADMIN — LEVETIRACETAM 500 MG: 500 TABLET, FILM COATED ORAL at 10:08

## 2021-01-01 RX ADMIN — AMLODIPINE BESYLATE 10 MG: 10 TABLET ORAL at 10:05

## 2021-01-01 RX ADMIN — APIXABAN 5 MG: 5 TABLET, FILM COATED ORAL at 12:08

## 2021-01-01 RX ADMIN — LACTULOSE 30 G: 10 SOLUTION ORAL at 02:05

## 2021-01-01 RX ADMIN — PANCRELIPASE 1 CAPSULE: 60000; 12000; 38000 CAPSULE, DELAYED RELEASE PELLETS ORAL at 04:01

## 2021-01-01 RX ADMIN — PANCRELIPASE 1 CAPSULE: 60000; 12000; 38000 CAPSULE, DELAYED RELEASE PELLETS ORAL at 05:04

## 2021-01-01 RX ADMIN — BISACODYL 10 MG: 10 SUPPOSITORY RECTAL at 08:08

## 2021-01-01 RX ADMIN — ONDANSETRON 4 MG: 2 INJECTION INTRAMUSCULAR; INTRAVENOUS at 12:05

## 2021-01-01 RX ADMIN — SODIUM CHLORIDE, SODIUM LACTATE, POTASSIUM CHLORIDE, AND CALCIUM CHLORIDE 500 ML: .6; .31; .03; .02 INJECTION, SOLUTION INTRAVENOUS at 03:08

## 2021-01-01 RX ADMIN — SEVELAMER CARBONATE 800 MG: 800 TABLET, FILM COATED ORAL at 09:03

## 2021-01-01 RX ADMIN — METRONIDAZOLE 500 MG: 500 INJECTION, SOLUTION INTRAVENOUS at 12:08

## 2021-01-01 RX ADMIN — OXYBUTYNIN CHLORIDE 10 MG: 10 TABLET, EXTENDED RELEASE ORAL at 09:01

## 2021-01-01 RX ADMIN — FLUTICASONE PROPIONATE 100 MCG: 50 SPRAY, METERED NASAL at 08:08

## 2021-01-01 RX ADMIN — BUMETANIDE 2 MG: 1 TABLET ORAL at 10:08

## 2021-01-01 RX ADMIN — SEVELAMER CARBONATE 1600 MG: 800 TABLET, FILM COATED ORAL at 10:08

## 2021-01-01 RX ADMIN — ROCURONIUM BROMIDE 50 MG: 10 INJECTION, SOLUTION INTRAVENOUS at 09:04

## 2021-01-01 RX ADMIN — LORAZEPAM 1 MG: 1 TABLET ORAL at 08:08

## 2021-01-01 RX ADMIN — MORPHINE SULFATE 2 MG: 2 INJECTION, SOLUTION INTRAMUSCULAR; INTRAVENOUS at 04:08

## 2021-01-01 RX ADMIN — LACTULOSE 15 G: 10 SOLUTION ORAL at 03:05

## 2021-01-01 RX ADMIN — SODIUM BICARBONATE 650 MG TABLET 1300 MG: at 10:03

## 2021-01-01 RX ADMIN — AMITRIPTYLINE HYDROCHLORIDE 50 MG: 50 TABLET, FILM COATED ORAL at 10:07

## 2021-01-01 RX ADMIN — PANCRELIPASE 1 CAPSULE: 60000; 12000; 38000 CAPSULE, DELAYED RELEASE PELLETS ORAL at 06:05

## 2021-01-01 RX ADMIN — POLYETHYLENE GLYCOL 3350 17 G: 17 POWDER, FOR SOLUTION ORAL at 12:05

## 2021-01-01 RX ADMIN — AMITRIPTYLINE HYDROCHLORIDE 50 MG: 50 TABLET, FILM COATED ORAL at 08:07

## 2021-01-01 RX ADMIN — METHYLPREDNISOLONE SODIUM SUCCINATE 20 MG: 40 INJECTION, POWDER, FOR SOLUTION INTRAMUSCULAR; INTRAVENOUS at 12:08

## 2021-01-01 RX ADMIN — CALCITONIN SALMON 256 UNITS: 200 INJECTION, SOLUTION INTRAMUSCULAR; SUBCUTANEOUS at 01:05

## 2021-01-01 RX ADMIN — POTASSIUM CHLORIDE 30 MEQ: 10 CAPSULE, COATED, EXTENDED RELEASE ORAL at 12:08

## 2021-01-01 RX ADMIN — DOCUSATE SODIUM 50MG AND SENNOSIDES 8.6MG 6 TABLET: 8.6; 5 TABLET, FILM COATED ORAL at 08:05

## 2021-01-01 RX ADMIN — ACETAMINOPHEN 650 MG: 325 TABLET ORAL at 08:07

## 2021-01-01 RX ADMIN — LEVETIRACETAM 500 MG: 500 TABLET ORAL at 10:07

## 2021-01-01 RX ADMIN — CALCITONIN SALMON 212 UNITS: 200 INJECTION, SOLUTION INTRAMUSCULAR; SUBCUTANEOUS at 05:05

## 2021-01-01 RX ADMIN — SEVELAMER CARBONATE 2.4 G: 2.4 POWDER, FOR SUSPENSION ORAL at 08:08

## 2021-01-01 RX ADMIN — SODIUM CHLORIDE 1000 ML: 0.9 INJECTION, SOLUTION INTRAVENOUS at 11:01

## 2021-01-01 RX ADMIN — SEVELAMER CARBONATE 800 MG: 800 TABLET, FILM COATED ORAL at 08:07

## 2021-01-01 RX ADMIN — SODIUM CHLORIDE: 0.9 INJECTION, SOLUTION INTRAVENOUS at 02:08

## 2021-01-01 RX ADMIN — SUGAMMADEX 107 MG: 100 INJECTION, SOLUTION INTRAVENOUS at 09:04

## 2021-01-01 RX ADMIN — IPRATROPIUM BROMIDE AND ALBUTEROL SULFATE 3 ML: .5; 2.5 SOLUTION RESPIRATORY (INHALATION) at 09:08

## 2021-01-01 RX ADMIN — CEFEPIME 2 G: 2 INJECTION, POWDER, FOR SOLUTION INTRAVENOUS at 12:08

## 2021-01-01 RX ADMIN — PANCRELIPASE 1 CAPSULE: 60000; 12000; 38000 CAPSULE, DELAYED RELEASE PELLETS ORAL at 08:03

## 2021-01-01 RX ADMIN — PREDNISONE 50 MG: 50 TABLET ORAL at 11:05

## 2021-01-01 RX ADMIN — SODIUM CHLORIDE 1000 ML: 0.9 INJECTION, SOLUTION INTRAVENOUS at 11:07

## 2021-01-01 RX ADMIN — SODIUM CHLORIDE, SODIUM LACTATE, POTASSIUM CHLORIDE, AND CALCIUM CHLORIDE: .6; .31; .03; .02 INJECTION, SOLUTION INTRAVENOUS at 05:01

## 2021-01-01 RX ADMIN — INSULIN ASPART 1 UNITS: 100 INJECTION, SOLUTION INTRAVENOUS; SUBCUTANEOUS at 05:08

## 2021-01-01 RX ADMIN — CALCITRIOL 0.5 MCG: 0.5 CAPSULE, LIQUID FILLED ORAL at 08:04

## 2021-01-01 RX ADMIN — MAGNESIUM CITRATE 296 ML: 1.75 LIQUID ORAL at 03:05

## 2021-01-01 RX ADMIN — AMLODIPINE BESYLATE 5 MG: 5 TABLET ORAL at 10:03

## 2021-01-01 RX ADMIN — SEVELAMER CARBONATE 800 MG: 800 TABLET, FILM COATED ORAL at 08:03

## 2021-01-01 RX ADMIN — MAGNESIUM CITRATE 296 ML: 1.75 LIQUID ORAL at 10:05

## 2021-01-01 RX ADMIN — GUAIFENESIN 600 MG: 600 TABLET, EXTENDED RELEASE ORAL at 10:08

## 2021-01-01 RX ADMIN — SEVELAMER CARBONATE 800 MG: 800 TABLET, FILM COATED ORAL at 12:07

## 2021-01-01 RX ADMIN — SEVELAMER CARBONATE 800 MG: 800 TABLET, FILM COATED ORAL at 12:03

## 2021-01-01 RX ADMIN — ERGOCALCIFEROL 50000 UNITS: 1.25 CAPSULE ORAL at 08:08

## 2021-01-01 RX ADMIN — SEVELAMER CARBONATE 1600 MG: 800 TABLET, FILM COATED ORAL at 07:05

## 2021-01-01 RX ADMIN — ASPIRIN 324 MG: 81 TABLET, CHEWABLE ORAL at 02:03

## 2021-01-01 RX ADMIN — MORPHINE SULFATE 2 MG: 2 INJECTION, SOLUTION INTRAMUSCULAR; INTRAVENOUS at 05:08

## 2021-01-01 RX ADMIN — CALCITRIOL 0.5 MCG: 0.5 CAPSULE, LIQUID FILLED ORAL at 09:05

## 2021-01-01 RX ADMIN — AMLODIPINE BESYLATE 5 MG: 5 TABLET ORAL at 05:04

## 2021-01-01 RX ADMIN — PANCRELIPASE 1 CAPSULE: 60000; 12000; 38000 CAPSULE, DELAYED RELEASE PELLETS ORAL at 08:04

## 2021-01-01 RX ADMIN — MAGNESIUM CITRATE 148 ML: 1.75 LIQUID ORAL at 12:05

## 2021-01-01 RX ADMIN — LACTULOSE 200 G: 10 SOLUTION ORAL at 10:05

## 2021-01-01 RX ADMIN — SODIUM CHLORIDE, SODIUM LACTATE, POTASSIUM CHLORIDE, AND CALCIUM CHLORIDE: .6; .31; .03; .02 INJECTION, SOLUTION INTRAVENOUS at 12:01

## 2021-01-01 RX ADMIN — ACETAMINOPHEN 650 MG: 325 TABLET ORAL at 10:08

## 2021-01-01 RX ADMIN — SODIUM BICARBONATE 650 MG TABLET 1300 MG: at 03:01

## 2021-01-01 RX ADMIN — LIDOCAINE HYDROCHLORIDE 5 ML: 10 INJECTION, SOLUTION INFILTRATION; PERINEURAL at 05:08

## 2021-01-01 RX ADMIN — SILODOSIN 4 MG: 4 CAPSULE ORAL at 09:08

## 2021-01-01 RX ADMIN — Medication 100 ML: at 11:05

## 2021-01-01 RX ADMIN — AMITRIPTYLINE HYDROCHLORIDE 50 MG: 50 TABLET, FILM COATED ORAL at 12:08

## 2021-01-01 RX ADMIN — ACETAMINOPHEN 650 MG: 325 TABLET ORAL at 06:05

## 2021-01-01 RX ADMIN — IRON SUCROSE 100 MG: 20 INJECTION, SOLUTION INTRAVENOUS at 09:03

## 2021-01-01 RX ADMIN — GUAIFENESIN 600 MG: 600 TABLET, EXTENDED RELEASE ORAL at 12:08

## 2021-01-09 NOTE — TELEPHONE ENCOUNTER
Left VM for patient with psych department's phone number, if she would like to make an appointment.   [Good] : ~his/her~  mood as  good [FreeTextEntry1] : health maintenance, migraine, fatigue [No] : No [] : No [de-identified] : none [de-identified] : none

## 2021-01-12 PROBLEM — Z12.11 ENCOUNTER FOR SCREENING COLONOSCOPY: Status: RESOLVED | Noted: 2018-11-07 | Resolved: 2021-01-01

## 2021-01-21 PROBLEM — E86.0 DEHYDRATION: Status: ACTIVE | Noted: 2021-01-01

## 2021-01-22 PROBLEM — N17.9 AKI (ACUTE KIDNEY INJURY): Status: RESOLVED | Noted: 2020-01-01 | Resolved: 2021-01-01

## 2021-01-23 PROBLEM — G93.41 ENCEPHALOPATHY, METABOLIC: Status: RESOLVED | Noted: 2018-04-27 | Resolved: 2021-01-01

## 2021-01-23 PROBLEM — E86.0 DEHYDRATION: Status: RESOLVED | Noted: 2021-01-01 | Resolved: 2021-01-01

## 2021-02-10 PROBLEM — N18.30 STAGE 3 CHRONIC KIDNEY DISEASE: Status: RESOLVED | Noted: 2018-04-12 | Resolved: 2021-01-01

## 2021-03-08 PROBLEM — R79.89 ELEVATED TROPONIN: Status: ACTIVE | Noted: 2021-01-01

## 2021-03-08 PROBLEM — Z71.89 GOALS OF CARE, COUNSELING/DISCUSSION: Status: ACTIVE | Noted: 2021-01-01

## 2021-03-09 PROBLEM — I50.9 ACUTE DECOMPENSATED HEART FAILURE: Status: ACTIVE | Noted: 2021-01-01

## 2021-03-16 PROBLEM — N18.5 CKD (CHRONIC KIDNEY DISEASE) STAGE 5, GFR LESS THAN 15 ML/MIN: Status: ACTIVE | Noted: 2021-01-01

## 2021-03-17 PROBLEM — I27.20 PULMONARY HYPERTENSION: Status: ACTIVE | Noted: 2021-01-01

## 2021-03-17 NOTE — PROGRESS NOTES
LINKS immunization registry, Care Everywhere and Health Maintenance updated.  Chart reviewed for overdue Proactive [facility] Encoun[facility] health maintenance testing.  
- - -

## 2021-04-08 PROBLEM — F33.41 RECURRENT MAJOR DEPRESSIVE DISORDER, IN PARTIAL REMISSION: Status: ACTIVE | Noted: 2021-01-01

## 2021-04-08 PROBLEM — K51.20 ULCERATIVE (CHRONIC) PROCTITIS WITHOUT COMPLICATIONS: Status: ACTIVE | Noted: 2021-01-01

## 2021-04-19 NOTE — ASSESSMENT & PLAN NOTE
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  Patient's name, date of birth and procedure confirmed. [Consent Obtained] : Consent obtained [Prevention of Pregnancy] : prevention of pregnancy [Risks] : risks [Benefits] : benefits [Alternatives] : alternatives [Patient] : patient [Infection] : infection [Bleeding] : bleeding [Pain] : pain [Expulsion] : expulsion [Failure] : failure [Uterine Perforation] : uterine perforation [Neg Pregnancy Test] : negative pregnancy test [Betadine] : Betadine [Easy Passage] : Easy passage [Post Placement Transvag. US] : post placement transvaginal ultrasound [Mirena IUD] : Mirena IUD [US Guidance] : US Guidance [Tolerated Well] : Patient tolerated the procedure well [No Complications] : No complications [de-identified] : [place]idad clamp [de-identified] : [id number] [de-identified] : 7/2023 [de-identified] : 4/2026

## 2021-04-28 PROBLEM — N18.6 ESRD (END STAGE RENAL DISEASE): Status: ACTIVE | Noted: 2021-01-01

## 2021-05-04 PROBLEM — K59.01 SLOW TRANSIT CONSTIPATION: Status: ACTIVE | Noted: 2021-01-01

## 2021-05-07 PROBLEM — E83.52 HYPERCALCEMIA: Status: ACTIVE | Noted: 2021-01-01

## 2021-05-12 PROBLEM — K59.00 CONSTIPATION: Status: ACTIVE | Noted: 2021-01-01

## 2021-05-13 PROBLEM — E43 SEVERE MALNUTRITION: Status: ACTIVE | Noted: 2021-01-01

## 2021-07-20 PROBLEM — Z99.2 ESRD ON PERITONEAL DIALYSIS: Status: ACTIVE | Noted: 2021-01-01

## 2021-07-21 PROBLEM — T85.611A PD CATHETER DYSFUNCTION: Status: ACTIVE | Noted: 2021-01-01

## 2021-07-22 PROBLEM — M89.9 CHRONIC KIDNEY DISEASE-MINERAL AND BONE DISORDER: Status: ACTIVE | Noted: 2017-09-28

## 2021-07-22 PROBLEM — T85.611A PD CATHETER DYSFUNCTION: Status: RESOLVED | Noted: 2021-01-01 | Resolved: 2021-01-01

## 2021-07-22 PROBLEM — E83.9 CHRONIC KIDNEY DISEASE-MINERAL AND BONE DISORDER: Status: ACTIVE | Noted: 2017-09-28

## 2021-08-10 PROBLEM — Z66 DNR (DO NOT RESUSCITATE): Status: ACTIVE | Noted: 2021-01-01

## 2021-08-10 PROBLEM — E87.1 HYPONATREMIA: Status: ACTIVE | Noted: 2021-01-01

## 2021-08-10 PROBLEM — Z85.07 HISTORY OF PANCREATIC CANCER: Status: ACTIVE | Noted: 2021-01-01

## 2021-08-10 PROBLEM — R53.1 GENERAL WEAKNESS: Status: ACTIVE | Noted: 2021-01-01

## 2021-08-10 PROBLEM — I50.33 ACUTE ON CHRONIC DIASTOLIC HEART FAILURE: Status: ACTIVE | Noted: 2021-01-01

## 2021-08-15 PROBLEM — I63.511 CEREBROVASCULAR ACCIDENT (CVA) DUE TO OCCLUSION OF RIGHT MIDDLE CEREBRAL ARTERY: Status: ACTIVE | Noted: 2021-01-01

## 2021-08-15 PROBLEM — R90.89 ABNORMAL FINDING ON MRI OF BRAIN: Status: ACTIVE | Noted: 2021-01-01

## 2021-08-16 PROBLEM — R53.1 WEAKNESS: Status: ACTIVE | Noted: 2018-04-27

## 2021-08-16 PROBLEM — T85.611A PD CATHETER DYSFUNCTION: Status: RESOLVED | Noted: 2021-01-01 | Resolved: 2021-01-01

## 2021-08-16 PROBLEM — R42 VERTIGO: Status: RESOLVED | Noted: 2017-10-08 | Resolved: 2021-01-01

## 2021-08-16 PROBLEM — R53.1 WEAKNESS: Status: RESOLVED | Noted: 2018-04-27 | Resolved: 2021-01-01

## 2021-08-16 PROBLEM — I63.511 CEREBRAL INFARCTION INVOLVING RIGHT MIDDLE CEREBRAL ARTERY: Status: ACTIVE | Noted: 2021-01-01

## 2021-08-17 PROBLEM — Z51.5 ENCOUNTER FOR PALLIATIVE CARE: Status: ACTIVE | Noted: 2021-01-01

## 2021-08-17 PROBLEM — D63.8 ANEMIA OF CHRONIC DISEASE: Status: ACTIVE | Noted: 2020-04-30

## 2021-08-17 PROBLEM — D63.8 ANEMIA OF CHRONIC DISEASE: Chronic | Status: ACTIVE | Noted: 2020-04-30

## 2021-08-19 PROBLEM — Z51.5 COMFORT MEASURES ONLY STATUS: Status: ACTIVE | Noted: 2021-01-01

## 2021-08-23 PROBLEM — I63.9 CVA (CEREBRAL VASCULAR ACCIDENT): Status: ACTIVE | Noted: 2021-01-01

## 2021-08-24 NOTE — ASSESSMENT & PLAN NOTE
Fasting sugar slightly elevated at 121 which is near diabetic range with an A1c of 6 3  If patient diabetic she would be at goal with this as an A1c should be less than 6for [de-identified]years old  Continue to observe recheck 6 months  Decrease simple carbs such as bread pasta potatoes rice junk food desert and snacks  Holding home atorvastatin in the setting of elevated LFTs. Resume when appropriate.

## 2021-08-25 LAB
AMPHIPHYSIN AB TITR SER: NEGATIVE TITER
CV2 IGG TITR SER: NEGATIVE TITER
GLIAL NUC TYPE 1 AB TITR SER: NEGATIVE TITER
HU1 AB TITR SER: NEGATIVE TITER
HU2 AB TITR SER IF: NEGATIVE TITER
HU3 AB TITR SER: NEGATIVE TITER
IMMUNOLOGIST REVIEW: NORMAL
NACHR AB SER-SCNC: 0 NMOL/L
PAVAL REFLEX TEST ADDED: NORMAL
PCA-1 AB TITR SER: NEGATIVE TITER
PCA-TR AB TITR SER: NEGATIVE TITER
PURKINJE CELL CYTOPLASMIC AB TYPE2: NEGATIVE TITER
VGCC-P/Q BIND AB SER-SCNC: 0 NMOL/L
VGKC AB SER-SCNC: 0.01 NMOL/L

## 2021-09-06 VITALS
SYSTOLIC BLOOD PRESSURE: 110 MMHG | HEART RATE: 85 BPM | RESPIRATION RATE: 18 BRPM | TEMPERATURE: 98 F | DIASTOLIC BLOOD PRESSURE: 58 MMHG

## 2021-10-01 NOTE — ASSESSMENT & PLAN NOTE
Patient presenting with worsening non productive cough and SOB. CXR with abnormal infiltration vs congestion. Her WBC is 12.33 K.  She is not on home oxygen. She was on 2-4 L of oxygen in the ED and her sats are in the lower 90s%.   - She received one dose of 2 g ceftriaxone IV  - 4/10 she is on 5 L nc of oxygen.  She spiked a fever of 101.2 overnight  - 4/11: O2 requirements increased to 10 L comfort flow. CXR with worsening edema and small effusion.   - 4/13: she is down to 4 L of O2 this morning. Cefepime switched to PO Augmentin.   - 4/15: she is down to 3 L of O2.     Plan:   - Continue Augmentin.   - continue steroids for suspected TRALI till Monday.  - continue duonebs   - continue to wean her off oxygen as tolerated.      Star Wedge Flap Text: The defect edges were debeveled with a #15 scalpel blade.  Given the location of the defect, shape of the defect and the proximity to free margins a star wedge flap was deemed most appropriate.  Using a sterile surgical marker, an appropriate rotation flap was drawn incorporating the defect and placing the expected incisions within the relaxed skin tension lines where possible. The area thus outlined was incised deep to adipose tissue with a #15 scalpel blade.  The skin margins were undermined to an appropriate distance in all directions utilizing iris scissors.

## 2022-03-23 NOTE — INTERVAL H&P NOTE
The patient has been examined and the H&P has been reviewed:    I concur with the findings and changes have been noted since the H&P was written: renal U/S demonstrates no upper urinary tract abnormality        There are no hospital problems to display for this patient.    
Statement Selected

## 2022-07-06 NOTE — PROGRESS NOTES
Detail Level: Zone Pharmacokinetic Initial Assessment: IV Vancomycin    Assessment/Plan:    Initiate intravenous vancomycin with loading dose of 500 mg once with subsequent doses when random concentrations are less than 20 mcg/mL  Desired empiric serum trough concentration is 10 to 20 mcg/mL  Draw vancomycin random level on 2/18 at 0445.  Pharmacy will continue to follow and monitor vancomycin.      Please contact pharmacy at extension [phone] with any questions regarding this assessment.     Thank you for the consult,   Clarisa Spencer       Patient brief summary:  Naty St is a 74 y.o. female initiated on antimicrobial therapy with IV Vancomycin for treatment of suspected opportunistic infection prophylaxis    Drug Allergies:   Review of patient's allergies indicates:   Allergen Reactions    Tobradex [tobramycin-dexamethasone] Swelling    Iodinated contrast media Hives    Latex Rash and Hives    Phenytoin sodium extended Other (See Comments) and Rash       Actual Body Weight:   66.8kg    Renal Function:   Estimated Creatinine Clearance: 26.8 mL/min (A) (based on SCr of 1.7 mg/dL (H)).,     Dialysis Method (if applicable):  N/A    CBC (last 72 hours):  Recent Labs   Lab Result Units 02/17/20  0304   WBC K/uL 11.20   Hemoglobin g/dL 8.8*   Hematocrit % 30.2*   Platelets K/uL 233   Gran% % 79.9*   Lymph% % 5.3*   Mono% % 12.0   Eosinophil% % 2.0   Basophil% % 0.3   Differential Method  Automated       Metabolic Panel (last 72 hours):  Recent Labs   Lab Result Units 02/17/20  0304 02/17/20  0335   Sodium mmol/L 138  --    Potassium mmol/L 3.9  --    Chloride mmol/L 110  --    CO2 mmol/L 17*  --    Glucose mg/dL 104  --    Glucose, UA   --  Negative   BUN, Bld mg/dL 29*  --    Creatinine mg/dL 1.7*  --    Albumin g/dL 2.8*  --    Total Bilirubin mg/dL 2.7*  --    Alkaline Phosphatase U/L 720*  --    AST U/L 845*  --    ALT U/L 383*  --        Drug levels (last 3 results):  No results for input(s): VANCOMYCINRA,  VANCOMYCINPE, VANCOMYCINTR in the last 72 hours.    Microbiologic Results:  Microbiology Results (last 7 days)     Procedure Component Value Units Date/Time    Influenza A & B by Molecular [[id number]] Collected:  02/17/20 0317    Order Status:  Completed Specimen:  Nasopharyngeal Swab Updated:  02/17/20 0350     Influenza A, Molecular Negative     Influenza B, Molecular Negative     Flu A & B Source Nasal swab    Blood culture x two cultures. Draw prior to antibiotics. [[id number]] Collected:  02/17/20 0330    Order Status:  Sent Specimen:  Blood from Peripheral, Antecubital, Right Updated:  02/17/20 0330    Blood culture x two cultures. Draw prior to antibiotics. [[id number]] Collected:  02/17/20 0308    Order Status:  Sent Specimen:  Blood from Peripheral, Hand, Right Updated:  02/17/20 0308         Topical Retinoid counseling:  Patient advised to apply a pea-sized amount only at bedtime and wait 30 minutes after washing their face before applying.  If too drying, patient may add a non-comedogenic moisturizer. The patient verbalized understanding of the proper use and possible adverse effects of retinoids.  All of the patient's questions and concerns were addressed. Winlevi Pregnancy And Lactation Text: This medication is considered safe during pregnancy and breastfeeding. Isotretinoin Counseling: Patient should get monthly blood tests, not donate blood, not drive at night if vision affected, not share medication, and not undergo elective surgery for 6 months after tx completed. Side effects reviewed, pt to contact office should one occur. Azelaic Acid Counseling: Patient counseled that medicine may cause skin irritation and to avoid applying near the eyes.  In the event of skin irritation, the patient was advised to reduce the amount of the drug applied or use it less frequently.   The patient verbalized understanding of the proper use and possible adverse effects of azelaic acid.  All of the patient's questions and concerns were addressed. Spironolactone Pregnancy And Lactation Text: This medication can cause feminization of the male fetus and should be avoided during pregnancy. The active metabolite is also found in breast milk. Bactrim Pregnancy And Lactation Text: This medication is Pregnancy Category D and is known to cause fetal risk.  It is also excreted in breast milk. Topical Clindamycin Pregnancy And Lactation Text: This medication is Pregnancy Category B and is considered safe during pregnancy. It is unknown if it is excreted in breast milk. Minocycline Pregnancy And Lactation Text: This medication is Pregnancy Category D and not consider safe during pregnancy. It is also excreted in breast milk. Topical Retinoid Pregnancy And Lactation Text: This medication is Pregnancy Category C. It is unknown if this medication is excreted in breast milk. Doxycycline Counseling:  Patient counseled regarding possible photosensitivity and increased risk for sunburn.  Patient instructed to avoid sunlight, if possible.  When exposed to sunlight, patients should wear protective clothing, sunglasses, and sunscreen.  The patient was instructed to call the office immediately if the following severe adverse effects occur:  hearing changes, easy bruising/bleeding, severe headache, or vision changes.  The patient verbalized understanding of the proper use and possible adverse effects of doxycycline.  All of the patient's questions and concerns were addressed. Tetracycline Counseling: Patient counseled regarding possible photosensitivity and increased risk for sunburn.  Patient instructed to avoid sunlight, if possible.  When exposed to sunlight, patients should wear protective clothing, sunglasses, and sunscreen.  The patient was instructed to call the office immediately if the following severe adverse effects occur:  hearing changes, easy bruising/bleeding, severe headache, or vision changes.  The patient verbalized understanding of the proper use and possible adverse effects of tetracycline.  All of the patient's questions and concerns were addressed. Patient understands to avoid pregnancy while on therapy due to potential birth defects. Include Pregnancy/Lactation Warning?: No Birth Control Pills Counseling: Birth Control Pill Counseling: I discussed with the patient the potential side effects of OCPs including but not limited to increased risk of stroke, heart attack, thrombophlebitis, deep venous thrombosis, hepatic adenomas, breast changes, GI upset, headaches, and depression.  The patient verbalized understanding of the proper use and possible adverse effects of OCPs. All of the patient's questions and concerns were addressed. Isotretinoin Pregnancy And Lactation Text: This medication is Pregnancy Category X and is considered extremely dangerous during pregnancy. It is unknown if it is excreted in breast milk. Azelaic Acid Pregnancy And Lactation Text: This medication is considered safe during pregnancy and breast feeding. Topical Sulfur Applications Counseling: Topical Sulfur Counseling: Patient counseled that this medication may cause skin irritation or allergic reactions.  In the event of skin irritation, the patient was advised to reduce the amount of the drug applied or use it less frequently.   The patient verbalized understanding of the proper use and possible adverse effects of topical sulfur application.  All of the patient's questions and concerns were addressed. Doxycycline Pregnancy And Lactation Text: This medication is Pregnancy Category D and not consider safe during pregnancy. It is also excreted in breast milk but is considered safe for shorter treatment courses. Azithromycin Counseling:  I discussed with the patient the risks of azithromycin including but not limited to GI upset, allergic reaction, drug rash, diarrhea, and yeast infections. Sarecycline Counseling: Patient advised regarding possible photosensitivity and discoloration of the teeth, skin, lips, tongue and gums.  Patient instructed to avoid sunlight, if possible.  When exposed to sunlight, patients should wear protective clothing, sunglasses, and sunscreen.  The patient was instructed to call the office immediately if the following severe adverse effects occur:  hearing changes, easy bruising/bleeding, severe headache, or vision changes.  The patient verbalized understanding of the proper use and possible adverse effects of sarecycline.  All of the patient's questions and concerns were addressed. Tazorac Counseling:  Patient advised that medication is irritating and drying.  Patient may need to apply sparingly and wash off after an hour before eventually leaving it on overnight.  The patient verbalized understanding of the proper use and possible adverse effects of tazorac.  All of the patient's questions and concerns were addressed. High Dose Vitamin A Counseling: Side effects reviewed, pt to contact office should one occur. Birth Control Pills Pregnancy And Lactation Text: This medication should be avoided if pregnant and for the first 30 days post-partum. Topical Sulfur Applications Pregnancy And Lactation Text: This medication is Pregnancy Category C and has an unknown safety profile during pregnancy. It is unknown if this topical medication is excreted in breast milk. Tazorac Pregnancy And Lactation Text: This medication is not safe during pregnancy. It is unknown if this medication is excreted in breast milk. Benzoyl Peroxide Counseling: Patient counseled that medicine may cause skin irritation and bleach clothing.  In the event of skin irritation, the patient was advised to reduce the amount of the drug applied or use it less frequently.   The patient verbalized understanding of the proper use and possible adverse effects of benzoyl peroxide.  All of the patient's questions and concerns were addressed. Azithromycin Pregnancy And Lactation Text: This medication is considered safe during pregnancy and is also secreted in breast milk. High Dose Vitamin A Pregnancy And Lactation Text: High dose vitamin A therapy is contraindicated during pregnancy and breast feeding. Aklief counseling:  Patient advised to apply a pea-sized amount only at bedtime and wait 30 minutes after washing their face before applying.  If too drying, patient may add a non-comedogenic moisturizer.  The most commonly reported side effects including irritation, redness, scaling, dryness, stinging, burning, itching, and increased risk of sunburn.  The patient verbalized understanding of the proper use and possible adverse effects of retinoids.  All of the patient's questions and concerns were addressed. Erythromycin Counseling:  I discussed with the patient the risks of erythromycin including but not limited to GI upset, allergic reaction, drug rash, diarrhea, increase in liver enzymes, and yeast infections. Winlevi Counseling:  I discussed with the patient the risks of topical clascoterone including but not limited to erythema, scaling, itching, and stinging. Patient voiced their understanding. Dapsone Counseling: I discussed with the patient the risks of dapsone including but not limited to hemolytic anemia, agranulocytosis, rashes, methemoglobinemia, kidney failure, peripheral neuropathy, headaches, GI upset, and liver toxicity.  Patients who start dapsone require monitoring including baseline LFTs and weekly CBCs for the first month, then every month thereafter.  The patient verbalized understanding of the proper use and possible adverse effects of dapsone.  All of the patient's questions and concerns were addressed. Spironolactone Counseling: Patient advised regarding risks of diarrhea, abdominal pain, hyperkalemia, birth defects (for female patients), liver toxicity and renal toxicity. The patient may need blood work to monitor liver and kidney function and potassium levels while on therapy. The patient verbalized understanding of the proper use and possible adverse effects of spironolactone.  All of the patient's questions and concerns were addressed. Topical Clindamycin Counseling: Patient counseled that this medication may cause skin irritation or allergic reactions.  In the event of skin irritation, the patient was advised to reduce the amount of the drug applied or use it less frequently.   The patient verbalized understanding of the proper use and possible adverse effects of clindamycin.  All of the patient's questions and concerns were addressed. Benzoyl Peroxide Pregnancy And Lactation Text: This medication is Pregnancy Category C. It is unknown if benzoyl peroxide is excreted in breast milk. Aklief Pregnancy And Lactation Text: It is unknown if this medication is safe to use during pregnancy.  It is unknown if this medication is excreted in breast milk.  Breastfeeding women should use the topical cream on the smallest area of the skin for the shortest time needed while breastfeeding.  Do not apply to nipple and areola. Erythromycin Pregnancy And Lactation Text: This medication is Pregnancy Category B and is considered safe during pregnancy. It is also excreted in breast milk. Dapsone Pregnancy And Lactation Text: This medication is Pregnancy Category C and is not considered safe during pregnancy or breast feeding. Minocycline Counseling: Patient advised regarding possible photosensitivity and discoloration of the teeth, skin, lips, tongue and gums.  Patient instructed to avoid sunlight, if possible.  When exposed to sunlight, patients should wear protective clothing, sunglasses, and sunscreen.  The patient was instructed to call the office immediately if the following severe adverse effects occur:  hearing changes, easy bruising/bleeding, severe headache, or vision changes.  The patient verbalized understanding of the proper use and possible adverse effects of minocycline.  All of the patient's questions and concerns were addressed. Bactrim Counseling:  I discussed with the patient the risks of sulfa antibiotics including but not limited to GI upset, allergic reaction, drug rash, diarrhea, dizziness, photosensitivity, and yeast infections.  Rarely, more serious reactions can occur including but not limited to aplastic anemia, agranulocytosis, methemoglobinemia, blood dyscrasias, liver or kidney failure, lung infiltrates or desquamative/blistering drug rashes. Detail Level: Detailed

## 2022-08-08 NOTE — MR AVS SNAPSHOT
Day #1 of Zosyn  Indication:  Aspiration PNA  Current regimen:  3.375g IV q6h  Abx regimen: monotherapy  Recent Labs     22  0537 22  0427 22  0434   WBC 22.1* 20.7* 19.7*   CREA 0.99 1.03 1.10   BUN 27* 25* 25*     Est CrCl: 90 ml/min; UO: -- ml/kg/hr  Temp (24hrs), Av.1 °F (36.7 °C), Min:97.5 °F (36.4 °C), Max:98.6 °F (37 °C)      Plan: Change to 4.5g IV x1, followed by 3.375g IV q8h via extended infusion per [address] [place] [address] P&T protocol Patient Information     Patient Name Sex Naty Betancur Female 1945      Visit Information        Provider Department Dep Phone Center    2017 4:00 PM NOM, CHEMO [facility] Chemotherapy Infusion [phone] Daniel Dillard      Patient Instructions     None      Your Current Medications Are     amitriptyline (ELAVIL) 25 MG tablet    amlodipine (NORVASC) 5 MG tablet    atenolol (TENORMIN) 50 MG tablet    clindamycin phosphate 1% (CLINDAGEL) 1 % gel    CREON CpDR    denosumab (XGEVA) 120 mg/1.7 mL (70 mg/mL) Soln    dronabinol (MARINOL) 5 MG capsule    enoxaparin (LOVENOX) 100 mg/mL Syrg    ERGOCALCIFEROL, VITAMIN D2, (VITAMIN D ORAL)    erlotinib (TARCEVA) 150 MG tablet    escitalopram oxalate (LEXAPRO) 10 MG tablet    hydrocodone-acetaminophen 5-325mg (NORCO) 5-325 mg per tablet    levetiracetam (KEPPRA) 750 MG Tab    lorazepam (ATIVAN) 1 MG tablet    mirabegron 50 mg Tb24    multivitamin (THERAGRAN) per tablet    ondansetron (ZOFRAN) 8 MG tablet    rivaroxaban (XARELTO) 20 mg Tab (Discontinued)      Facility-Administered Medications     denosumab (XGEVA) solution 120 mg    sodium chloride 0.9% bolus 1,000 mL      Appointments for Next Year     2017  3:05 PM NON FASTING LAB (5 min.) [facility] LAB, APPOINTMENT [place]    Arrive at check-in approximately 15 minutes before your scheduled appointment time. Bring all outside medical records and imaging, along with a list of your current medications and insurance card.    2nd Floor    2017  3:10 PM URINE (15 min.) [facility] SPECIMEN, MAIN CAMPUS    Arrive at check-in approximately 15 minutes before your scheduled appointment time. Bring all outside medical records and imaging, along with a list of your current medications and insurance card.    2017  3:30 PM US RETROPER (45 min.) [facility] US 11 ALL    Arrive at check-in approximately 15 minutes before your scheduled  appointment time. Bring all outside medical records and imaging, along with a list of your current medications and insurance card.    2nd Floor    1/31/2017 10:30 AM PET CT  (30 min.) [facility] PET CT LIMIT 400 LBS    You have been scheduled for a PET scan. Do Not eat or drink anything 6 hours before your test, except for water. If you are scheduled at [facility], report to the Radiology / Laboratory check in on the second floor.  Upon arrival to the PET department we will have to do a finger stick to check your glucose level. An intravenous line (IV) will be started, and you will be given a PET tracer. This injection needs to circulate for about 60 minutes while you are sitting in a quiet area. You will lie on a table and the camera will take pictures, this takes about 30-45 minutes.  A special radiopharmaceutical must be ordered for your test so if you have any questions or need to reschedule your appointment please call nuclear medicine ([phone]).    Report to Radiology/Laboratory  - 2nd Floor    2/1/2017  1:30 PM ESTABLISHED PATIENT (30 min.) [facility] Aide Carvalho DO    Arrive at check-in approximately 15 minutes before your scheduled appointment time. Bring all outside medical records and imaging, along with a list of your current medications and insurance card.    [facility], [address] Please park in [facility] Parking Garage.    2/2/2017  1:00 PM NON FASTING LAB (15 min.) [facility] LAB, HEMONC SAME DAY    Arrive at check-in approximately 15 minutes before your scheduled appointment time. Bring all outside medical records and imaging, along with a list of your current medications and insurance card.    [facility] 3rd Floor    2/2/2017  2:00 PM ESTABLISHED PATIENT (30 min.) [facility] Hematology Oncology Karrie Vazquez MD    Arrive at check-in approximately 15 minutes  before your scheduled appointment time. Bring all outside medical records and imaging, along with a list of your current medications and insurance card.    [facility] - 3rd Floor    2/2/2017  2:45 PM INJECTION (15 min.) [facility] INJECTION, [facility] INFUSION    Arrive at check-in approximately 15 minutes before your scheduled appointment time. Bring all outside medical records and imaging, along with a list of your current medications and insurance card.    [facility], 5th Floor    3/13/2017 12:00 PM MRI BRAIN CONT (45 min.) [facility] MRI WIDE BORE    Magnetic Resonance Imaging- You will not be allowed to have the MRI if you have a cardiac pace-maker, implantable defibrillator, neurostimulator, biostimulator, or if you have anuerysm clips in your brain (from many years ago).  WHAT YOUR DOCTOR NEEDS TO KNOW: You should tell your doctor if you have any metal in your body either from surgery or an injury. This includes metal pins, clips, plates, screws, shrapnel, ear implants, or permanent eyeliner. If female, you should tell your doctor if there is a chance you might be pregnant. Please bring with you any papers your doctor has given you to sign. Please bring with you a list of medications you are currently taking!  PLEASE REPORT TO THE MAGNETIC RESONANCE CENTER 30 MINUTES PRIOR TO YOUR SCHEDULED APPOINTMENT TIME.  WHAT IS THE PURPOSE OF THIS TEST: An MRI is a painless test that takes pictures of the inside of your body. The pictures are taken in slices which show only a few layers of body tissue at a time. Pictures taken this way can help your doctor find and see problems in the body more easily. The MRI uses a magnetic field and radio waves instead of X-RAYS.  WHERE WILL YOUR TEST BE DONE AND BY WHOM? The test will be done in the MRI building. It will be done under the supervision of a radiologist and a radiologic technologist. The person giving you  these instructions will tell you where to report for this test. It usually takes 15 minutes to one hour.  HOW TO PREPARE FOR YOUR TEST: Wear comfortable clothing with no metal buttons or zippers. Do not wear jewelry including rings, earrings, necklaces, bracelets, or watches. Take all metal objects out of your hair. You will be asked to answer yes or no on a questionaire form regarding the possibility of any metal in your body. Please bring someone with you if you are unable to answer the questions. A parent must accompany any child having an MRI. Tell your doctor if you are afraid of being in a closed or cramped space. Your doctor will decide if you need medicine to help you relax.  A small needle may be placed in a vein in your arm or hand so that you may receive a contrast solution. THIS IS NOT A DYE AND DOES NOT CONTAIN IODINE. NO special preparation for any MRI exam EXCEPT for a Magnetic Resonance Cholangiopancreatogram which the patient should fast 4 hours prior to the scheduled  appointment time. You may take your usual medication with a small sip of water.  WHAT TO EXPECT DURING YOUR TEST: The radiologic technologist will check to make sure that you have removed all metal objects. You will be asked to lie down on the table of the MRI machine. To allow for the best quality exam, it is VERY IMPORTANT that you LIE VERY STILL during your exam. When the test starts, the table will move into the open tunnel of the machine. Once the machine starts taking pictures, you will hear loud hammering or grinding noises. You may be able to wear headphones and hear music to block out the loud noise of the machine. If your test requires the use of contrast material, a small needle will be placed in a vein of your arm or hand. The contrast material will be pushed through the IV tube that has been placed in your arm or hand. The skin around your IV may feel warm or cold. You should not have any changes in the way you feel. If  "you do, please tell the technologist.  WHAT TO EXPECT AFTER THE EXAM: If you were given medication to relax you, someone else will need to drive you home. DO NOT DRIVE OR USE HEAVY EQUIPMENT IF YOU TAKE MEDICATION THAT MAKES YOU DROWSY. You may resume normal daily activity if you did not receive sedation.  WHAT YOU NEED TO KNOW ABOUT YOUR APPOINTMENT: If you are unable to follow the above instructions or have questions or if you are delayed or unable to keep your scheduled appointment, please notify the following: In [place], call the Magnetic Resonance Center at [phone] In [place], call the Radiology Department at [phone]. On the [facility], call the Radiology Department at [phone]. On the [facility], call the Radiology Department at [phone].    MRI Building    3/13/2017  1:30 PM ESTABLISHED PATIENT (30 min.) [facility] - Neurosurgery [facility] Salty Ferrera MD    Arrive at check-in approximately 15 minutes before your scheduled appointment time. Bring all outside medical records and imaging, along with a list of your current medications and insurance card.    7th Floor         Default Flowsheet Data (last 24 hours)      Amb Complex Vitals Yosvany        01/26/17 1701 01/26/17 1505             Measurements    Weight  61.4 kg (135 lb 5.8 oz)       Height  5' 5" (1.651 m)       BSA (Calculated - sq m)  1.68 sq meters       BMI (Calculated)  22.6       BP (!)  165/74 (!)  140/70       Temp 97.8 °F (36.6 °C)        Pulse 69 62       Resp 16        SpO2  98 %       Pain Assessment    Pain Score Zero Zero               Allergies     Tobradex [Tobramycin-dexamethasone] Swelling    Iodinated Contrast Media - Iv Dye Hives    Morphine Other (See Comments)    "shaking" and tremors    Latex Rash    Phenytoin Sodium Extended Other (See Comments), Rash      Current Discharge Medication List     Cannot display discharge medications since this is not an admission.      "

## 2022-10-19 NOTE — PROGRESS NOTES
-- 3rd Attempt to complete initial assessment for Outpatient Care Management;left message requesting a return call. A letter was mailed at the time of my 2nd attempt to complete initial assessment, requesting a return call from patient. Will close case at this time. Loli LEE-OPCM     [address]  [facility] EMERGENCY DEPT  [phone] [id number] [phone]    Work/School Note    Date: 10/19/2022    To Whom It May concern:    Audrey Ellison was seen and treated today in the emergency room by the following provider(s):  Attending Provider: Nichole Hardin MD  Physician Assistant: Jeanne Gaxiola, [address] Audrey Ellison is excused from work/school on 10/19/2022 through 10/21/2022. She is medically clear to return to work/school on 10/22/2022.          Sincerely,          FRANKY Hall

## 2022-12-09 NOTE — TELEPHONE ENCOUNTER
----- Message from Donna Ferrera sent at 1/24/2018 12:31 PM CST -----  Contact: self  Please call pt regarding an injection that she is supposed to have tomorrow.  She can be reached at [phone]  
----- Message from Malini Zelaya sent at 1/24/2018 12:58 PM CST -----  Contact: Pt's Son Mr Gracia  Pt called to speak to the nurse regarding the pt's care and would like a call back today.    Mr Gracia can be reached at [phone].    Thanks  
I would just let her get it next week. We can get her back on q 4 week schedule after that. PJ  
See other note  
She last received xgeva on 12/28.  If counts are OK, may she get xgeva tomorrow? Or should we schedule her after she sees dr sullivan next week?  dayron    
Spoke with patient.   She understands nurse will schedule her for xgeva after her appt with dr sullivan next week, as receiving it this week will be a little too early.  Patient voiced understanding.    
No indicators present

## 2023-01-11 NOTE — SUBJECTIVE & OBJECTIVE
Interval History: NAEON. Patient is awaiting placement with SNF    Oncology Treatment Plan:   [No treatment plan]    Medications:  Continuous Infusions:  Scheduled Meds:   amLODIPine  5 mg Oral Daily    amoxicillin-clavulanate 500-125mg  1 tablet Oral BID    apixaban  5 mg Oral BID    atorvastatin  40 mg Oral Daily    dronabinol  5 mg Oral BID AC    levETIRAcetam  750 mg Oral BID    lipase-protease-amylase 12,000-38,000-60,000 units  1 capsule Oral TID WM    methylPREDNISolone sodium succinate  80 mg Intravenous TID    metoprolol succinate  50 mg Oral Daily    multivitamin  1 tablet Oral Daily    sodium bicarbonate  650 mg Oral TID     PRN Meds:acetaminophen, albuterol-ipratropium 2.5mg-0.5mg/3mL, dextrose 50%, dextrose 50%, glucagon (human recombinant), glucose, glucose, LORazepam, ondansetron, senna-docusate 8.6-50 mg, sodium chloride 0.9%     Review of Systems   Constitutional: Negative for chills and fever.   Respiratory: Positive for shortness of breath (improving). Negative for cough.    Cardiovascular: Negative for chest pain and palpitations.   Gastrointestinal: Negative for abdominal pain, constipation, diarrhea, nausea and vomiting.   Skin: Negative for rash.   Neurological: Negative for headaches.     Objective:     Vital Signs (Most Recent):  Temp: 97.8 °F (36.6 °C) (05/02/18 0806)  Pulse: 69 (05/02/18 0806)  Resp: 18 (05/02/18 0806)  BP: (!) 146/68 (05/02/18 0806)  SpO2: 96 % (05/02/18 0808) Vital Signs (24h Range):  Temp:  [97.8 °F (36.6 °C)-98.8 °F (37.1 °C)] 97.8 °F (36.6 °C)  Pulse:  [69-73] 69  Resp:  [18-20] 18  SpO2:  [93 %-99 %] 96 %  BP: (119-146)/(58-68) 146/68     Weight: 61.8 kg (136 lb 3.9 oz)  Body mass index is 22.67 kg/m².  Body surface area is 1.68 meters squared.      Intake/Output Summary (Last 24 hours) at 05/02/18 0833  Last data filed at 05/01/18 1538   Gross per 24 hour   Intake              658 ml   Output              400 ml   Net              258 ml       Physical  Pt only voided 125 ml since 8:25 am, bladder scanned and shows 62 ml. Exam   Constitutional: She is oriented to person, place, and time. She appears well-developed and well-nourished.   Eyes: Right eye exhibits no discharge. Left eye exhibits no discharge.   Cardiovascular: Normal rate, regular rhythm and normal heart sounds.  Exam reveals no gallop and no friction rub.    No murmur heard.  Pulmonary/Chest: Effort normal and breath sounds normal. No respiratory distress. She has no wheezes. She has no rales.   Abdominal: Soft. Bowel sounds are normal. She exhibits no distension. There is no tenderness. There is no rebound and no guarding.   Neurological: She is alert and oriented to person, place, and time.       Significant Labs:   Recent Results (from the past 24 hour(s))   Comprehensive Metabolic Panel (CMP)    Collection Time: 05/02/18  4:40 AM   Result Value Ref Range    Sodium 140 136 - 145 mmol/L    Potassium 4.4 3.5 - 5.1 mmol/L    Chloride 106 95 - 110 mmol/L    CO2 27 23 - 29 mmol/L    Glucose 147 (H) 70 - 110 mg/dL    BUN, Bld 30 (H) 8 - 23 mg/dL    Creatinine 1.6 (H) 0.5 - 1.4 mg/dL    Calcium 8.4 (L) 8.7 - 10.5 mg/dL    Total Protein 5.7 (L) 6.0 - 8.4 g/dL    Albumin 2.3 (L) 3.5 - 5.2 g/dL    Total Bilirubin 0.3 0.1 - 1.0 mg/dL    Alkaline Phosphatase 71 55 - 135 U/L    AST 18 10 - 40 U/L    ALT 19 10 - 44 U/L    Anion Gap 7 (L) 8 - 16 mmol/L    eGFR if African American 36.6 (A) >60 mL/min/1.73 m^2    eGFR if non  31.7 (A) >60 mL/min/1.73 m^2   Magnesium    Collection Time: 05/02/18  4:40 AM   Result Value Ref Range    Magnesium 2.4 1.6 - 2.6 mg/dL   Phosphorus    Collection Time: 05/02/18  4:40 AM   Result Value Ref Range    Phosphorus 2.5 (L) 2.7 - 4.5 mg/dL   CBC with Automated Differential    Collection Time: 05/02/18  4:40 AM   Result Value Ref Range    WBC 17.80 (H) 3.90 - 12.70 K/uL    RBC 4.05 4.00 - 5.40 M/uL    Hemoglobin 10.5 (L) 12.0 - 16.0 g/dL    Hematocrit 34.7 (L) 37.0 - 48.5 %    MCV 86 82 - 98 fL    MCH 25.9 (L) 27.0 - 31.0 pg    MCHC  30.3 (L) 32.0 - 36.0 g/dL    RDW 17.3 (H) 11.5 - 14.5 %    Platelets 294 150 - 350 K/uL    MPV 9.8 9.2 - 12.9 fL    Immature Granulocytes 2.0 (H) 0.0 - 0.5 %    Gran # (ANC) 15.9 (H) 1.8 - 7.7 K/uL    Immature Grans (Abs) 0.36 (H) 0.00 - 0.04 K/uL    Lymph # 1.1 1.0 - 4.8 K/uL    Mono # 0.5 0.3 - 1.0 K/uL    Eos # 0.0 0.0 - 0.5 K/uL    Baso # 0.02 0.00 - 0.20 K/uL    nRBC 0 0 /100 WBC    Gran% 89.2 (H) 38.0 - 73.0 %    Lymph% 6.2 (L) 18.0 - 48.0 %    Mono% 2.5 (L) 4.0 - 15.0 %    Eosinophil% 0.0 0.0 - 8.0 %    Basophil% 0.1 0.0 - 1.9 %    Differential Method Automated      Diagnostic Results:  No new imaging or procedures to review since prior assessment

## 2023-08-11 NOTE — PLAN OF CARE
Problem: Patient Care Overview  Goal: Plan of Care Review  Outcome: Ongoing (interventions implemented as appropriate)  Inadequate oral intake related to decline in condition, level of consciousness as evidenced by po intake < 50% of needs x 5 days    Recommendation/Intervention: Continue Regular diet, Continue boost at BID, Assist with meals, Appetite stimulant has been added, RD to follow  Goals: PO to meet 85% of EEN by discharge  Nutrition Goal Status: new  Communication of RD Recs: discussed on rounds          Estimated Blood Loss (Cc): 5 cc

## 2024-01-01 NOTE — PLAN OF CARE
Problem: Discharge Planning  Goal: Discharge to home or other facility with appropriate resources  1/1/2024 0407 by Merle Sharma, RN  Outcome: Progressing  Flowsheets (Taken 1/1/2024 0407)  Discharge to home or other facility with appropriate resources:   Identify discharge learning needs (meds, wound care, etc)   Refer to discharge planning if patient needs post-hospital services based on physician order or complex needs related to functional status, cognitive ability or social support system   Identify barriers to discharge with patient and caregiver   Arrange for needed discharge resources and transportation as appropriate  Note: Inform pt. Of discharge teaching and planned. Instructed pt. To inform me if further teaching need to be done.      Problem: Pain  Goal: Verbalizes/displays adequate comfort level or baseline comfort level  1/1/2024 0407 by Merle Sharma, RN  Outcome: Progressing  Flowsheets (Taken 1/1/2024 0407)  Verbalizes/displays adequate comfort level or baseline comfort level:   Encourage patient to monitor pain and request assistance   Administer analgesics based on type and severity of pain and evaluate response   Consider cultural and social influences on pain and pain management   Assess pain using appropriate pain scale   Implement non-pharmacological measures as appropriate and evaluate response   Notify Licensed Independent Practitioner if interventions unsuccessful or patient reports new pain  Note: PT. Received pain meds in timely manner. Teach pt. Non-pharm. Methods to handle pain.      Problem: Safety - Adult  Goal: Free from fall injury  Outcome: Progressing  Flowsheets (Taken 1/1/2024 0407)  Free From Fall Injury:   Instruct family/caregiver on patient safety   Based on caregiver fall risk screen, instruct family/caregiver to ask for assistance with transferring infant if caregiver noted to have fall risk factors  Note: Made sure call light was in reach, a clear pathway and  Problem: Patient Care Overview  Goal: Plan of Care Review  Outcome: Ongoing (interventions implemented as appropriate)  Pt AAOx4 and independent with nonskid footwear on and bed locked and in lowest position with bed rails up x2. Call light is within reach and  is at the bedside. Pt had two large BMs overnight. NGT in place and clamped. No c/o nausea this shift. Remained afebrile throughout with no falls or injuries. BP elevated overnight 182/80, rechecked prior to giving prn labetolol and was 170/81. Physician notified and labetalol not given. All other VSS. Will continue to monitor Pt.

## 2025-02-28 NOTE — PROGRESS NOTES
"Speech Therapy   Evaluation    [address]   MRN: [id number]        10/12/17 1445   Speech Time Calculation   Speech Start Time 1445   Speech Stop Time 1530   Speech Total (min) 45 min   General Information   SLP Treatment Date 10/12/17   Referring Physician Dr Ponce   General Observations Pt seen individually in ST office while sitting upright in w/c.    Pertinent History of Current Problem Past Medical History:   Diagnosis Date    Anticoagulant long-term use     Blood clot in vein 06/2016    Breast cancer 1994    Cataract     Hypertension     Pancreatic cancer 1998    Posterior capsular opacification, left eye     Psychiatric problem     Seizures 01/25/2016    Stroke     Therapy       General Precautions aspiration;fall   Visual/Auditory Vision impaired  (glasses(not present for eval))   Subjective   Patient states Pt stated "I've been having some trouble with my thinking. Now My speech is bad too."   Pain/Comfort   Pain Rating no pain   Assessment   SLP Diagnosis moderate-severe cognitive-linguistic deficits; moderate dysarthria   Prognosis Good   Problem List decreased attention skills; imprecise articulation; word selection errors; word finding deficits; decreased thought organization skills; poor problem solving; poor recall skills   Assessment Pt is a 71 y/o female admitted following R cerebral CVA. Pt with medical history including left frontal meningioma with craniotomy 1/2016. Pt reports living independently, including driving, prior to hospitalization. Pt also completed financial/medication management indly prior. Pt presents today with moderate-severe cognitive-linguistic deficits. Pt with decreased word selection, decreased vocal intensity, decreased speech intelligibility skills, dysfluencies of speech, and fast rate of speech within evaluation. Pt completed complex YNQ task with 100% acc, followed simple directions with 80% acc, and completed auditory comprehension task of paragraph " Overall patient is very functional.  Does have a fracture in the left fifth rib but seems to be doing well with this.  Watch twisting activity and sleep in a recliner at night for the next few weeks.  Is taking Advil for this which helps.  Has had flu shot and [facility]  Continue to push daily physical activity   information with 50% acc. Pt completed simple symbol and single word reading task with 87% acc indly. Pt verbalized DEANNA indly with 100% acc; however, moderate dysarthria present. Pt completed labeling objects task and responsive naming task with 100% acc indly. Pt listed 9 items of concrete category within one minute of time (norm 15-20). Immediate memory task completed with 33% acc, STM task completed with 33% acc, and LTM task completed with 85% acc. Pt completed simple verbal reasoning task with 50% acc, completed simple verbal sequencing task with 50% acc, and completed simple home environment problem solving task with 80% acc. Pt presents with poor attention skills, decreased thought organization, and decreased processing skills at this time. Pt completed simple money/time management/calculations task with 25% acc. Pt is oriented to person, place, and situation indly with 100% acc; however, orientation to time details with 70% acc.     Dysphagia evaluation completed. Pt tolerated regular solid cracker and thin liquids via straw without overt s/s of aspiration present. Pt with appropriate laryngeal elevation palpated upon swallow initiation. Pt denies difficulty with oral intake at this time. Recommendations remain for regular diet with thin liquids and whole medication. No further dysphagia services recommended at this time.     Pt to benefit from skilled ST services targeting language and cognitive skills for increase in functional status. Education provided to pt regarding ST role, recommendations, and plan of care. Pt agreeable to plan at this time.    Level of Motivation/Participation good   Recommendations   Solid Diet Level Regular   Liquid Diet Level Thin   Additional Recommendations standard/universal aspiration precautions   Short Term Goals   Goal 1 Pt will be oriented x4 indly with 100% acc.    Goal 2 Pt will complete language comprehension tasks given supervision with 90% acc targeting  attention/comprehension skills.    Goal 3 Pt will complete expressive language tasks targeting vocal intensity and word finding given min verbal cues with 75% acc.    Goal 4 Pt will complete problem solving/reasoning/sequencing tasks given min verbal and visual cues with 75% acc.    Goal 5 Pt will complete recall task of recent/functional information given moderate verbal cues with 60% acc.    Long Term Goals   Goal 1 Pt will complete language comprehension tasks with modified independence with % acc targeting attention/comprehension skills.    Goal 2 Pt will complete expressive language tasks targeting vocal intensity and word finding given supervision with 90% acc.    Goal 3 Pt will complete problem solving/reasoning/sequencing tasks given supervision with 90% acc.    Goal 4 Pt will complete recall task of recent/functional information given minimal verbal cues with 80% acc.    Goal 5 Pt will complete complex financial/medication management tasks given supervision with 90% acc.    Discharge Recommendations   Discharge Facility/Level Of Care Needs home health speech therapy   Plan   Plan Plan of care intiated;Speech Language/Cognitive Therapy;Patient Education;Family Education   Therapy Frequency Monday-Friday;Saturday or Sunday   Plan of Care Expires on 11/12/17   SLP Follow-up   SLP Follow-up? Yes   Treatment/Billable Minutes   Eval 30   Eval Swallow and Oral Function 15   Total Time 45     The patient's spiritual, cultural, social, and educational needs were considered with no evidence of barriers noted, and the patient is agreeable to plan of care.    FIM Scores  Comprehension:4  Expression: 3  Social Interaction:4  Problem Solving:3  Memory: 2    Comprehension:  Complex= humor, finances, rationale for medical treatment(hip precautions, pressure relief)  Basic= pain, hunger, thirst, bathroom needs, cold, nutrition, sleep    Understands basic 75-89%- may need words repeated (4)    Expression:  Expresses  basic 50-74% of time - Needs to repeat parts of sentences  (3)    Social Interaction:  Interacts appropriately 75-89% of time - needs redirection for appropriate language or to initiate interaction  (4)    Problem Solving:  Solves basic problems 50-74% of the time  (3)    Memory:  Recognizes, recalls, or executes 25-49% of time 1 step of 2 step request  (2)    Jewels Amado CCC-SLP  10/12/2017

## (undated) DEVICE — SUT 2-0 12-18IN SILK

## (undated) DEVICE — CAP BETA QUINTON 14661-001

## (undated) DEVICE — DRESSING TRANS 8X12 TEGADERM

## (undated) DEVICE — PACK SET UP CONVERTORS

## (undated) DEVICE — NDL ASPIRATING VIZISHOT 20-40M

## (undated) DEVICE — SYR 10CC LUER LOCK

## (undated) DEVICE — SEE MEDLINE ITEM 152487

## (undated) DEVICE — TROCAR ENDOPATH XCEL 5MM 7.5CM

## (undated) DEVICE — ELECTRODE REM PLYHSV RETURN 9

## (undated) DEVICE — SYR SLIP TIP 20CC

## (undated) DEVICE — SEE MEDLINE ITEM 157131

## (undated) DEVICE — SOL NS 1000CC

## (undated) DEVICE — DRAPE CORETEMP FLD WRM 56X62IN

## (undated) DEVICE — Device

## (undated) DEVICE — CATH BRONCHOSCOPE F/BF

## (undated) DEVICE — PACK DISP Y TEC

## (undated) DEVICE — SEE MEDLINE ITEM 146313

## (undated) DEVICE — SUT GUT PL. 4-0 27 FS-2

## (undated) DEVICE — ADAPTER SWIVEL

## (undated) DEVICE — SET EXTENSION STAY SAFE LL

## (undated) DEVICE — DRAPE INCISE IOBAN 2 23X17IN

## (undated) DEVICE — GAUZE SPONGE 4X4 12PLY

## (undated) DEVICE — CAP STAY-SAFE STERILE

## (undated) DEVICE — SET IV ADMIN 15DROP 3 CARESITE

## (undated) DEVICE — TUBING HF INSUFFLATION W/ FLTR

## (undated) DEVICE — NDL HYPO REG 25G X 1 1/2

## (undated) DEVICE — CONTAINER SPECIMEN STRL 4OZ

## (undated) DEVICE — SYR SLIP TIP 10ML SHIELD

## (undated) DEVICE — ADHESIVE DERMABOND ADVANCED

## (undated) DEVICE — TRAY MINOR GEN SURG

## (undated) DEVICE — LUBRICANT SURGILUBE 2 OZ

## (undated) DEVICE — BLADE SURG CARBON STEEL SZ11

## (undated) DEVICE — ETHYL ALCOHOL DEHYDRANT 95%

## (undated) DEVICE — GAUZE FLUFF XXLG 36X36 2 PLY

## (undated) DEVICE — CYTOSPIN COLLECTION FLUID BLT